# Patient Record
Sex: FEMALE | Race: WHITE | NOT HISPANIC OR LATINO | Employment: OTHER | URBAN - METROPOLITAN AREA
[De-identification: names, ages, dates, MRNs, and addresses within clinical notes are randomized per-mention and may not be internally consistent; named-entity substitution may affect disease eponyms.]

---

## 2023-05-04 ENCOUNTER — PATIENT OUTREACH (OUTPATIENT)
Dept: CARE COORDINATION | Facility: CLINIC | Age: 68
End: 2023-05-04
Payer: MEDICARE

## 2023-05-08 ENCOUNTER — PATIENT OUTREACH (OUTPATIENT)
Dept: CARE COORDINATION | Facility: CLINIC | Age: 68
End: 2023-05-08
Payer: MEDICARE

## 2023-06-14 ENCOUNTER — HOSPITAL ENCOUNTER (OUTPATIENT)
Dept: DATA CONVERSION | Facility: HOSPITAL | Age: 68
End: 2023-06-14
Attending: INTERNAL MEDICINE | Admitting: INTERNAL MEDICINE
Payer: MEDICARE

## 2023-06-14 DIAGNOSIS — I13.0 HYPERTENSIVE HEART AND CHRONIC KIDNEY DISEASE WITH HEART FAILURE AND STAGE 1 THROUGH STAGE 4 CHRONIC KIDNEY DISEASE, OR UNSPECIFIED CHRONIC KIDNEY DISEASE (MULTI): ICD-10-CM

## 2023-06-14 DIAGNOSIS — N18.9 CHRONIC KIDNEY DISEASE, UNSPECIFIED: ICD-10-CM

## 2023-06-14 DIAGNOSIS — D63.1 ANEMIA IN CHRONIC KIDNEY DISEASE (CODE): ICD-10-CM

## 2023-06-14 DIAGNOSIS — Z91.040 LATEX ALLERGY STATUS: ICD-10-CM

## 2023-06-14 DIAGNOSIS — E66.9 OBESITY, UNSPECIFIED: ICD-10-CM

## 2023-06-14 DIAGNOSIS — Z79.01 LONG TERM (CURRENT) USE OF ANTICOAGULANTS: ICD-10-CM

## 2023-06-14 DIAGNOSIS — I48.0 PAROXYSMAL ATRIAL FIBRILLATION (MULTI): ICD-10-CM

## 2023-06-14 DIAGNOSIS — E78.5 HYPERLIPIDEMIA, UNSPECIFIED: ICD-10-CM

## 2023-06-14 DIAGNOSIS — I50.32 CHRONIC DIASTOLIC (CONGESTIVE) HEART FAILURE (MULTI): ICD-10-CM

## 2023-06-14 DIAGNOSIS — Z79.82 LONG TERM (CURRENT) USE OF ASPIRIN: ICD-10-CM

## 2023-06-14 DIAGNOSIS — Z86.73 PERSONAL HISTORY OF TRANSIENT ISCHEMIC ATTACK (TIA), AND CEREBRAL INFARCTION WITHOUT RESIDUAL DEFICITS: ICD-10-CM

## 2023-06-14 DIAGNOSIS — J44.9 CHRONIC OBSTRUCTIVE PULMONARY DISEASE, UNSPECIFIED (MULTI): ICD-10-CM

## 2023-06-14 DIAGNOSIS — Z53.8 PROCEDURE AND TREATMENT NOT CARRIED OUT FOR OTHER REASONS: ICD-10-CM

## 2023-06-14 DIAGNOSIS — Z85.528 PERSONAL HISTORY OF OTHER MALIGNANT NEOPLASM OF KIDNEY: ICD-10-CM

## 2023-06-14 DIAGNOSIS — E03.9 HYPOTHYROIDISM, UNSPECIFIED: ICD-10-CM

## 2023-06-14 DIAGNOSIS — K21.9 GASTRO-ESOPHAGEAL REFLUX DISEASE WITHOUT ESOPHAGITIS: ICD-10-CM

## 2023-06-14 DIAGNOSIS — F17.210 NICOTINE DEPENDENCE, CIGARETTES, UNCOMPLICATED: ICD-10-CM

## 2023-06-14 DIAGNOSIS — E11.22 TYPE 2 DIABETES MELLITUS WITH DIABETIC CHRONIC KIDNEY DISEASE (MULTI): ICD-10-CM

## 2023-06-14 DIAGNOSIS — Z88.0 ALLERGY STATUS TO PENICILLIN: ICD-10-CM

## 2023-07-02 LAB
ATRIAL RATE: 62 BPM
P AXIS: 76 DEGREES
P OFFSET: 169 MS
P ONSET: 121 MS
PR INTERVAL: 188 MS
Q ONSET: 215 MS
QRS COUNT: 10 BEATS
QRS DURATION: 120 MS
QT INTERVAL: 482 MS
QTC CALCULATION(BAZETT): 489 MS
QTC FREDERICIA: 487 MS
R AXIS: -51 DEGREES
T AXIS: 103 DEGREES
T OFFSET: 456 MS
VENTRICULAR RATE: 62 BPM

## 2023-07-26 LAB
ANION GAP IN SER/PLAS: 13 MMOL/L (ref 10–20)
CALCIUM (MG/DL) IN SER/PLAS: 7.9 MG/DL (ref 8.6–10.6)
CARBON DIOXIDE, TOTAL (MMOL/L) IN SER/PLAS: 32 MMOL/L (ref 21–32)
CHLORIDE (MMOL/L) IN SER/PLAS: 97 MMOL/L (ref 98–107)
CREATININE (MG/DL) IN SER/PLAS: 2.72 MG/DL (ref 0.5–1.05)
GFR FEMALE: 18 ML/MIN/1.73M2
GLUCOSE (MG/DL) IN SER/PLAS: 343 MG/DL (ref 74–99)
POTASSIUM (MMOL/L) IN SER/PLAS: 3.4 MMOL/L (ref 3.5–5.3)
SODIUM (MMOL/L) IN SER/PLAS: 139 MMOL/L (ref 136–145)
UREA NITROGEN (MG/DL) IN SER/PLAS: 49 MG/DL (ref 6–23)

## 2023-07-27 ENCOUNTER — PATIENT OUTREACH (OUTPATIENT)
Dept: CARE COORDINATION | Facility: CLINIC | Age: 68
End: 2023-07-27
Payer: MEDICARE

## 2023-09-18 ENCOUNTER — DOCUMENTATION (OUTPATIENT)
Dept: PRIMARY CARE | Facility: CLINIC | Age: 68
End: 2023-09-18
Payer: MEDICARE

## 2023-09-25 ENCOUNTER — HOSPITAL ENCOUNTER (OUTPATIENT)
Dept: DATA CONVERSION | Facility: HOSPITAL | Age: 68
End: 2023-09-25
Attending: INTERNAL MEDICINE | Admitting: INTERNAL MEDICINE
Payer: MEDICARE

## 2023-09-25 DIAGNOSIS — D50.9 IRON DEFICIENCY ANEMIA, UNSPECIFIED: ICD-10-CM

## 2023-09-25 DIAGNOSIS — K56.699 OTHER INTESTINAL OBSTRUCTION UNSPECIFIED AS TO PARTIAL VERSUS COMPLETE OBSTRUCTION (MULTI): ICD-10-CM

## 2023-09-25 DIAGNOSIS — K64.0 FIRST DEGREE HEMORRHOIDS: ICD-10-CM

## 2023-09-25 LAB — POCT GLUCOSE: 178 MG/DL (ref 74–99)

## 2023-09-26 LAB — POCT INTERNATIONAL NORMALIZATION RATIO: 1.1 (ref 0.9–1.1)

## 2023-09-27 LAB
COMPLETE PATHOLOGY REPORT: NORMAL
CONVERTED CLINICAL DIAGNOSIS-HISTORY: NORMAL
CONVERTED FINAL DIAGNOSIS: NORMAL
CONVERTED FINAL REPORT PDF LINK TO COPY AND PASTE: NORMAL
CONVERTED GROSS DESCRIPTION: NORMAL

## 2023-09-29 VITALS
HEIGHT: 66 IN | RESPIRATION RATE: 20 BRPM | DIASTOLIC BLOOD PRESSURE: 82 MMHG | HEART RATE: 76 BPM | BODY MASS INDEX: 28.34 KG/M2 | WEIGHT: 176.37 LBS | TEMPERATURE: 97.9 F | SYSTOLIC BLOOD PRESSURE: 199 MMHG

## 2023-09-30 NOTE — H&P
History of Present Illness:   History Present Illness:  Reason for surgery: Anemia with iron deficiency   HPI:    Patient was seen in GI clinic by Amadeo for further evaluation due to anemia with iron deficiency.  No previous EGD.  Previous colonoscopy more than 10 years ago    Allergies:        Allergies:  ·  Latex : Unknown  ·  codeine : Unknown  ·  amoxicillin : Unknown  ·  Doxycycline  Hyclate: Unknown  ·  lisinopril : Unknown  ·  Tamiflu : Unknown  ·  Celexa : Unknown  ·  predniSONE : Unknown  ·  citalopram : Unknown  ·  Preparation  H: Unknown  ·  NYLON : Unknown    Home Medication Review:   Home Medications Reviewed: yes     Impression/Procedure:   ·  Impression and Planned Procedure: EGD, colonoscopy       ERAS (Enhanced Recovery After Surgery):  ·  ERAS Patient: no       Vital Signs:  Temperature C: 36.6 degrees C   Temperature F: 97.8 degrees F   Heart Rate: 76 beats per minute   Respiratory Rate: 20 breath per minute   Blood Pressure Systolic: 199 mm/Hg   Blood Pressure Diastolic: 82 mm/Hg     Physical Exam by System:    Respiratory/Thorax: Patent airways, CTAB, normal  breath sounds with good chest expansion, thorax symmetric   Cardiovascular: Regular, rate and rhythm, no murmurs,  2+ equal pulses of the extremities, normal S 1and S 2     Consent:   COVID-19 Consent:  ·  COVID-19 Risk Consent Surgeon has reviewed key risks related to the risk of octavio COVID-19 and if they contract COVID-19 what the risks are.       Electronic Signatures:  Kyung Cisneros)  (Signed 25-Sep-2023 08:13)   Authored: History of Present Illness, Allergies, Home  Medication Review, Impression/Procedure, ERAS, Physical Exam, Consent, Note Completion      Last Updated: 25-Sep-2023 08:13 by Kyung Cisneros)

## 2023-10-03 DIAGNOSIS — E03.9 HYPOTHYROIDISM, UNSPECIFIED TYPE: ICD-10-CM

## 2023-10-03 RX ORDER — LEVOTHYROXINE SODIUM 200 UG/1
200 TABLET ORAL
COMMUNITY
End: 2023-10-03 | Stop reason: SDUPTHER

## 2023-10-03 RX ORDER — LEVOTHYROXINE SODIUM 200 UG/1
200 TABLET ORAL
Qty: 90 TABLET | Refills: 3 | Status: SHIPPED | OUTPATIENT
Start: 2023-10-03

## 2023-10-03 RX ORDER — METOPROLOL SUCCINATE 50 MG/1
50 TABLET, EXTENDED RELEASE ORAL 2 TIMES DAILY
COMMUNITY
End: 2024-01-12

## 2023-10-11 DIAGNOSIS — I50.42 CHRONIC COMBINED SYSTOLIC AND DIASTOLIC HEART FAILURE (MULTI): Primary | ICD-10-CM

## 2023-10-11 RX ORDER — TORSEMIDE 60 MG/1
1 TABLET, FILM COATED ORAL DAILY
COMMUNITY
Start: 2023-07-23 | End: 2023-10-11 | Stop reason: SDUPTHER

## 2023-10-11 RX ORDER — TORSEMIDE 60 MG/1
1 TABLET, FILM COATED ORAL DAILY
Qty: 30 TABLET | Refills: 3 | Status: ON HOLD | OUTPATIENT
Start: 2023-10-11 | End: 2024-02-12

## 2023-10-12 ENCOUNTER — DOCUMENTATION (OUTPATIENT)
Dept: PRIMARY CARE | Facility: CLINIC | Age: 68
End: 2023-10-12
Payer: MEDICARE

## 2023-10-12 PROBLEM — R91.1 LUNG NODULE: Status: ACTIVE | Noted: 2023-10-12

## 2023-10-12 PROBLEM — F17.200 SMOKER: Status: ACTIVE | Noted: 2023-10-12

## 2023-10-12 PROBLEM — I48.0 PAROXYSMAL ATRIAL FIBRILLATION (MULTI): Status: ACTIVE | Noted: 2023-10-12

## 2023-10-12 PROBLEM — R26.2 TROUBLE WALKING: Status: ACTIVE | Noted: 2023-10-12

## 2023-10-12 PROBLEM — Z85.528 HISTORY OF RENAL CELL CARCINOMA: Status: ACTIVE | Noted: 2023-10-12

## 2023-10-12 PROBLEM — D50.0 IRON DEFICIENCY ANEMIA DUE TO CHRONIC BLOOD LOSS: Status: ACTIVE | Noted: 2023-10-12

## 2023-10-12 PROBLEM — Z90.5 HISTORY OF PARTIAL NEPHRECTOMY: Status: ACTIVE | Noted: 2023-10-12

## 2023-10-12 PROBLEM — I63.40 CEREBRAL INFARCTION DUE TO EMBOLISM OF CEREBRAL ARTERY (MULTI): Status: ACTIVE | Noted: 2023-10-12

## 2023-10-12 PROBLEM — I77.6 VASCULITIS (CMS-HCC): Status: ACTIVE | Noted: 2023-10-12

## 2023-10-12 PROBLEM — E11.9 TYPE 2 DIABETES MELLITUS (MULTI): Status: ACTIVE | Noted: 2023-10-12

## 2023-10-12 PROBLEM — M17.12 PRIMARY OSTEOARTHRITIS OF LEFT KNEE: Status: ACTIVE | Noted: 2023-10-12

## 2023-10-12 PROBLEM — J44.9 CHRONIC OBSTRUCTIVE PULMONARY DISEASE (MULTI): Status: ACTIVE | Noted: 2023-10-12

## 2023-10-12 PROBLEM — R41.82 ALTERED MENTAL STATUS: Status: ACTIVE | Noted: 2023-10-12

## 2023-10-12 PROBLEM — E11.65 HYPERGLYCEMIA DUE TO TYPE 2 DIABETES MELLITUS (MULTI): Status: ACTIVE | Noted: 2023-10-12

## 2023-10-12 PROBLEM — I69.311 MEMORY DEFICIT AFTER CEREBRAL INFARCTION: Status: ACTIVE | Noted: 2023-10-12

## 2023-10-12 PROBLEM — F32.A DEPRESSION: Status: ACTIVE | Noted: 2023-10-12

## 2023-10-12 PROBLEM — F41.9 ANXIETY: Status: ACTIVE | Noted: 2023-10-12

## 2023-10-12 PROBLEM — F32.9 MAJOR DEPRESSIVE DISORDER, SINGLE EPISODE, UNSPECIFIED: Status: ACTIVE | Noted: 2023-10-12

## 2023-10-12 PROBLEM — D64.9 ABSOLUTE ANEMIA: Status: ACTIVE | Noted: 2023-10-12

## 2023-10-12 PROBLEM — I50.43 ACUTE ON CHRONIC COMBINED SYSTOLIC AND DIASTOLIC HEART FAILURE (MULTI): Status: ACTIVE | Noted: 2023-10-12

## 2023-10-12 PROBLEM — R53.82 CHRONIC FATIGUE: Status: ACTIVE | Noted: 2023-10-12

## 2023-10-12 PROBLEM — I10 HYPERTENSION: Status: ACTIVE | Noted: 2023-10-12

## 2023-10-12 PROBLEM — I13.0: Status: ACTIVE | Noted: 2023-10-12

## 2023-10-12 PROBLEM — K21.9 GASTROESOPHAGEAL REFLUX DISEASE: Status: ACTIVE | Noted: 2023-10-12

## 2023-10-12 PROBLEM — M19.90 OSTEOARTHRITIS: Status: ACTIVE | Noted: 2023-10-12

## 2023-10-12 PROBLEM — L30.9 PERIANAL DERMATITIS: Status: ACTIVE | Noted: 2023-10-12

## 2023-10-12 PROBLEM — I73.9 PERIPHERAL VASCULAR DISEASE (CMS-HCC): Status: ACTIVE | Noted: 2023-10-12

## 2023-10-12 PROBLEM — F51.01 PRIMARY INSOMNIA: Status: ACTIVE | Noted: 2023-10-12

## 2023-10-12 PROBLEM — E11.21 DIABETIC RENAL DISEASE (MULTI): Status: ACTIVE | Noted: 2023-10-12

## 2023-10-12 PROBLEM — N18.32 STAGE 3B CHRONIC KIDNEY DISEASE (CKD) (MULTI): Status: ACTIVE | Noted: 2023-10-12

## 2023-10-12 PROBLEM — E78.2 MIXED HYPERLIPIDEMIA: Status: ACTIVE | Noted: 2023-10-12

## 2023-10-12 PROBLEM — K59.01 CONSTIPATION BY DELAYED COLONIC TRANSIT: Status: ACTIVE | Noted: 2023-10-12

## 2023-10-12 PROBLEM — K60.2 ANAL FISSURE: Status: ACTIVE | Noted: 2023-10-12

## 2023-10-12 PROBLEM — N28.89 RIGHT RENAL MASS: Status: ACTIVE | Noted: 2023-10-12

## 2023-10-12 PROBLEM — D72.829 LEUKOCYTOSIS: Status: ACTIVE | Noted: 2023-10-12

## 2023-10-12 RX ORDER — FERROUS SULFATE 325(65) MG
65 TABLET ORAL DAILY
COMMUNITY
End: 2023-10-13 | Stop reason: WASHOUT

## 2023-10-12 RX ORDER — TRAZODONE HYDROCHLORIDE 50 MG/1
50 TABLET ORAL NIGHTLY PRN
COMMUNITY
Start: 2012-12-01 | End: 2023-10-13 | Stop reason: WASHOUT

## 2023-10-12 RX ORDER — AMIODARONE HYDROCHLORIDE 200 MG/1
200 TABLET ORAL
COMMUNITY
End: 2024-06-10 | Stop reason: SDUPTHER

## 2023-10-12 RX ORDER — OMEPRAZOLE 40 MG/1
1 CAPSULE, DELAYED RELEASE ORAL DAILY
COMMUNITY
Start: 2015-12-28 | End: 2023-10-13 | Stop reason: WASHOUT

## 2023-10-12 RX ORDER — TRAMADOL HYDROCHLORIDE 50 MG/1
1 TABLET ORAL 2 TIMES DAILY PRN
COMMUNITY
Start: 2013-07-29 | End: 2023-10-13 | Stop reason: WASHOUT

## 2023-10-12 RX ORDER — LOVASTATIN 40 MG/1
1 TABLET ORAL DAILY
COMMUNITY
Start: 2015-12-28 | End: 2023-10-13 | Stop reason: WASHOUT

## 2023-10-12 RX ORDER — POTASSIUM CHLORIDE 1500 MG/1
20 TABLET, EXTENDED RELEASE ORAL DAILY
COMMUNITY
End: 2024-01-12

## 2023-10-12 RX ORDER — BLOOD-GLUCOSE METER
KIT MISCELLANEOUS 2 TIMES DAILY
COMMUNITY
Start: 2014-10-30 | End: 2024-03-05 | Stop reason: HOSPADM

## 2023-10-12 RX ORDER — ALBUTEROL SULFATE 90 UG/1
2 AEROSOL, METERED RESPIRATORY (INHALATION) EVERY 4 HOURS PRN
COMMUNITY
Start: 2023-03-11

## 2023-10-12 RX ORDER — ATENOLOL 25 MG/1
1 TABLET ORAL 2 TIMES DAILY
COMMUNITY
Start: 2014-04-08 | End: 2023-10-13 | Stop reason: WASHOUT

## 2023-10-12 RX ORDER — BUPROPION HYDROCHLORIDE 300 MG/1
1 TABLET ORAL DAILY
COMMUNITY
Start: 2015-12-28 | End: 2023-10-13 | Stop reason: WASHOUT

## 2023-10-12 RX ORDER — FUROSEMIDE 20 MG/1
20 TABLET ORAL DAILY
COMMUNITY
Start: 2023-02-11 | End: 2023-10-13 | Stop reason: WASHOUT

## 2023-10-12 RX ORDER — TORSEMIDE 20 MG/1
40 TABLET ORAL DAILY
COMMUNITY
Start: 2023-05-24 | End: 2023-10-13 | Stop reason: WASHOUT

## 2023-10-12 RX ORDER — ATORVASTATIN CALCIUM 40 MG/1
40 TABLET, FILM COATED ORAL DAILY
COMMUNITY

## 2023-10-12 RX ORDER — AMLODIPINE BESYLATE 5 MG/1
5 TABLET ORAL DAILY
COMMUNITY
Start: 2023-09-21 | End: 2024-03-05 | Stop reason: HOSPADM

## 2023-10-12 RX ORDER — TORSEMIDE 40 MG/1
1 TABLET, FILM COATED ORAL DAILY
COMMUNITY
Start: 2023-05-03 | End: 2023-10-13 | Stop reason: WASHOUT

## 2023-10-12 RX ORDER — INSULIN LISPRO 100 [IU]/ML
INJECTION, SOLUTION INTRAVENOUS; SUBCUTANEOUS
COMMUNITY
Start: 2021-11-16 | End: 2024-04-29 | Stop reason: SDUPTHER

## 2023-10-12 RX ORDER — GLYBURIDE-METFORMIN HYDROCHLORIDE 5; 500 MG/1; MG/1
2 TABLET ORAL 2 TIMES DAILY
COMMUNITY
Start: 2015-12-28 | End: 2023-10-13 | Stop reason: WASHOUT

## 2023-10-12 RX ORDER — PANTOPRAZOLE SODIUM 40 MG/1
40 TABLET, DELAYED RELEASE ORAL 2 TIMES DAILY
COMMUNITY
Start: 2021-11-05 | End: 2024-01-04 | Stop reason: SDUPTHER

## 2023-10-12 RX ORDER — CLOPIDOGREL BISULFATE 75 MG/1
75 TABLET ORAL DAILY
COMMUNITY

## 2023-10-12 RX ORDER — INSULIN GLARGINE 100 [IU]/ML
10 INJECTION, SOLUTION SUBCUTANEOUS NIGHTLY
COMMUNITY
End: 2024-04-29 | Stop reason: SDUPTHER

## 2023-10-12 RX ORDER — ASPIRIN 81 MG/1
81 TABLET ORAL DAILY
COMMUNITY

## 2023-10-12 RX ORDER — PEN NEEDLE, DIABETIC 32GX 5/32"
NEEDLE, DISPOSABLE MISCELLANEOUS
COMMUNITY
Start: 2023-08-31

## 2023-10-12 RX ORDER — FERROUS GLUCONATE 324(38)MG
1 TABLET ORAL DAILY
COMMUNITY
End: 2024-01-02 | Stop reason: SDUPTHER

## 2023-10-12 RX ORDER — FLASH GLUCOSE SENSOR
KIT MISCELLANEOUS
COMMUNITY
Start: 2023-10-06 | End: 2024-05-29 | Stop reason: SDUPTHER

## 2023-10-12 RX ORDER — IPRATROPIUM BROMIDE AND ALBUTEROL SULFATE 2.5; .5 MG/3ML; MG/3ML
3 SOLUTION RESPIRATORY (INHALATION) EVERY 6 HOURS PRN
COMMUNITY
End: 2024-05-02 | Stop reason: HOSPADM

## 2023-10-12 RX ORDER — VENLAFAXINE HYDROCHLORIDE 75 MG/1
150 CAPSULE, EXTENDED RELEASE ORAL DAILY
COMMUNITY
End: 2024-05-03 | Stop reason: SDUPTHER

## 2023-10-12 RX ORDER — APIXABAN 5 MG/1
5 TABLET, FILM COATED ORAL 2 TIMES DAILY
COMMUNITY
Start: 2023-05-26 | End: 2023-10-13 | Stop reason: WASHOUT

## 2023-10-12 NOTE — PROGRESS NOTES
In home House Calls CM visit 10/11/23. Compliant with taking medications most days over past 2 weeks. Stated heart catheterization was not able to be completed due to inability to lay flat. Continues to use oxygen at night. No leg edema noted. Weight today 180. Reviewed meds with patient. Pill boxes filled. Will follow up in 2 weeks.

## 2023-10-13 ENCOUNTER — OFFICE VISIT (OUTPATIENT)
Dept: PRIMARY CARE | Facility: CLINIC | Age: 68
End: 2023-10-13
Payer: MEDICARE

## 2023-10-13 VITALS
OXYGEN SATURATION: 93 % | SYSTOLIC BLOOD PRESSURE: 136 MMHG | BODY MASS INDEX: 28.25 KG/M2 | HEIGHT: 67 IN | WEIGHT: 180 LBS | TEMPERATURE: 97.3 F | RESPIRATION RATE: 16 BRPM | DIASTOLIC BLOOD PRESSURE: 78 MMHG | HEART RATE: 60 BPM

## 2023-10-13 DIAGNOSIS — J44.9 CHRONIC OBSTRUCTIVE PULMONARY DISEASE, UNSPECIFIED COPD TYPE (MULTI): ICD-10-CM

## 2023-10-13 DIAGNOSIS — F33.1 MODERATE EPISODE OF RECURRENT MAJOR DEPRESSIVE DISORDER (MULTI): ICD-10-CM

## 2023-10-13 DIAGNOSIS — Z79.4 TYPE 2 DIABETES MELLITUS WITH HYPERGLYCEMIA, WITH LONG-TERM CURRENT USE OF INSULIN (MULTI): ICD-10-CM

## 2023-10-13 DIAGNOSIS — N18.32 STAGE 3B CHRONIC KIDNEY DISEASE (CKD) (MULTI): ICD-10-CM

## 2023-10-13 DIAGNOSIS — F17.200 SMOKER: ICD-10-CM

## 2023-10-13 DIAGNOSIS — I48.0 PAROXYSMAL ATRIAL FIBRILLATION (MULTI): ICD-10-CM

## 2023-10-13 DIAGNOSIS — I10 PRIMARY HYPERTENSION: ICD-10-CM

## 2023-10-13 DIAGNOSIS — E11.65 TYPE 2 DIABETES MELLITUS WITH HYPERGLYCEMIA, WITH LONG-TERM CURRENT USE OF INSULIN (MULTI): ICD-10-CM

## 2023-10-13 DIAGNOSIS — I50.43 ACUTE ON CHRONIC COMBINED SYSTOLIC AND DIASTOLIC HEART FAILURE (MULTI): Primary | ICD-10-CM

## 2023-10-13 DIAGNOSIS — D50.0 IRON DEFICIENCY ANEMIA DUE TO CHRONIC BLOOD LOSS: ICD-10-CM

## 2023-10-13 PROCEDURE — 3046F HEMOGLOBIN A1C LEVEL >9.0%: CPT | Performed by: NURSE PRACTITIONER

## 2023-10-13 PROCEDURE — 3066F NEPHROPATHY DOC TX: CPT | Performed by: NURSE PRACTITIONER

## 2023-10-13 PROCEDURE — 1126F AMNT PAIN NOTED NONE PRSNT: CPT | Performed by: NURSE PRACTITIONER

## 2023-10-13 PROCEDURE — 1159F MED LIST DOCD IN RCRD: CPT | Performed by: NURSE PRACTITIONER

## 2023-10-13 PROCEDURE — 3075F SYST BP GE 130 - 139MM HG: CPT | Performed by: NURSE PRACTITIONER

## 2023-10-13 PROCEDURE — 3078F DIAST BP <80 MM HG: CPT | Performed by: NURSE PRACTITIONER

## 2023-10-13 PROCEDURE — 1160F RVW MEDS BY RX/DR IN RCRD: CPT | Performed by: NURSE PRACTITIONER

## 2023-10-13 PROCEDURE — 99349 HOME/RES VST EST MOD MDM 40: CPT | Performed by: NURSE PRACTITIONER

## 2023-10-13 RX ORDER — LEVOTHYROXINE SODIUM 50 UG/1
50 TABLET ORAL
COMMUNITY
End: 2024-05-03 | Stop reason: SDUPTHER

## 2023-10-13 ASSESSMENT — ENCOUNTER SYMPTOMS
APPETITE CHANGE: 0
CONSTIPATION: 0
PALPITATIONS: 0
NAUSEA: 0
DIFFICULTY URINATING: 0
VOMITING: 0
CHILLS: 0
COUGH: 0
SHORTNESS OF BREATH: 1
OCCASIONAL FEELINGS OF UNSTEADINESS: 1
TROUBLE SWALLOWING: 0
LIGHT-HEADEDNESS: 0
LOSS OF SENSATION IN FEET: 1
DEPRESSION: 1
FEVER: 0
WHEEZING: 1
DIARRHEA: 0
ABDOMINAL PAIN: 0
DIZZINESS: 0

## 2023-10-13 ASSESSMENT — PAIN SCALES - GENERAL: PAINLEVEL: 0-NO PAIN

## 2023-10-13 NOTE — PROGRESS NOTES
Subjective   Patient ID: Daphne Morris is a 68 y.o. female who presents for Follow-up (Multiple chronic medical conditions).    Visit for 69 y/o female seen today in private home, alone for routine follow up of multiple medical conditions. She is sitting on recliner with legs dependent. She is alert, able to answer simple questions regarding her health but does have noted memory impairment and is an overall poor historian regarding her health. She lives in a mobile home that is very unkept. There is garbage and boxes piled all throughout the home. Her stove is covered with multiple pizza boxes, donut boxes and there is cereal and boxes of snacks on the counter tops. Her granddaughter recently moved in and she reports increased stress as she is now responsible for her. She continues to have difficulty managing her medications. She is active with house calls CM Autumn who fills patients pill box for her every 2 weeks. She was seen in home yesterday and Autumn reported that patient has been compliant with her medications. Patient is able to prepare her own meals and perform ADLs without difficulty. She denies appetite changes or signs of weight loss. Denies abdominal pain, nausea, vomiting. Denies bowel or bladder concerns. Remains ambulatory with rollator. Has history of frequent falls.     Afib- patient had watchman procedure on 8/22. She is doing well. No longer on Eliquis. Taking Aspirin and Plavix now. Denies any chest pain, heart palpitations.   Anemia- patient did follow up with GI, Dr. Cisneros last month. She had her EGD/Colonoscopy on 9/25. She needs to have a repeat coloscopy in 1 year because the bowel prep was poor and there was a large amount of stool in the colon.   CHF- patient is trying to check her weight daily but admits that she will forget at times. Her weight has been stable. Denies any issues with increased shortness of breath nor edema. Has difficulty following a low sodium diet.   DM- uses  "freestyle shakir. Monitoring glucose levels 3x daily. She reports her glucose levels to be \"ok\". BG was 268 this morning but she reports eating a donut before checking her levels. Values have ranged from . She continues to eat high carb, snacks, junk food. Does not eat healthy diet. Does not exercise.  CKD- patient has not followed up with nephrologist Dr. Mitchell. She did miss her last scheduled appt with nephrology and is uncertain if she ever rescheduled the appt.     Home Visit:          Medically necessary due to: Illness or condition that results in activity lmitation or restriction that impacts the ability to leave home such as:, unsteady gait/poor balance         Current Outpatient Medications:     albuterol 90 mcg/actuation inhaler, Inhale 2 puffs every 4 hours if needed., Disp: , Rfl:     amiodarone (Pacerone) 200 mg tablet, Take 1 tablet (200 mg) by mouth once daily with a meal., Disp: , Rfl:     amLODIPine (Norvasc) 5 mg tablet, Take 1 tablet (5 mg) by mouth once daily., Disp: , Rfl:     aspirin 81 mg EC tablet, Take 1 tablet (81 mg) by mouth once daily., Disp: , Rfl:     atorvastatin (Lipitor) 40 mg tablet, Take 1 tablet (40 mg) by mouth once daily., Disp: , Rfl:     BD Calista 2nd Gen Pen Needle 32 gauge x 5/32\" needle, USE SUBCUTANEOUSLY AS DIRECTED TWICE DAILY, Disp: , Rfl:     clopidogrel (Plavix) 75 mg tablet, Take 1 tablet (75 mg) by mouth once daily., Disp: , Rfl:     ferrous gluconate 324 (38 Fe) MG tablet, Take 1 tablet (324 mg) by mouth once daily., Disp: , Rfl:     FreeStyle Shakir 14 Day Sensor kit, USE AS DIRECTED TO CHECK SUGARS 3 TO 4 TIMES DAILY, Disp: , Rfl:     FreeStyle Lite Strips strip, twice a day., Disp: , Rfl:     HumaLOG KwikPen Insulin 100 unit/mL injection, up to 15 units Subcutaneous three times a day per sliding scale, Disp: , Rfl:     ipratropium-albuteroL (Duo-Neb) 0.5-2.5 mg/3 mL nebulizer solution, Take 3 mL by nebulization every 6 hours if needed., Disp: , Rfl:     " Lantus Solostar U-100 Insulin 100 unit/mL (3 mL) pen, Inject 14 Units under the skin once daily at bedtime., Disp: , Rfl:     levothyroxine (Synthroid, Levoxyl) 200 mcg tablet, Take 1 tablet (200 mcg) by mouth once daily in the morning. Take before meals., Disp: 90 tablet, Rfl: 3    levothyroxine (Synthroid, Levoxyl) 50 mcg tablet, Take 1 tablet (50 mcg) by mouth once daily in the morning. Take before meals., Disp: , Rfl:     metoprolol succinate XL (Toprol-XL) 50 mg 24 hr tablet, Take 1 tablet (50 mg) by mouth 2 times a day., Disp: , Rfl:     oxygen (O2) gas therapy, 2 l/min nasal cannula, Disp: , Rfl:     pantoprazole (ProtoNix) 40 mg EC tablet, Take 1 tablet (40 mg) by mouth 2 times a day., Disp: , Rfl:     potassium chloride CR 20 mEq ER tablet, Take 1 tablet (20 mEq) by mouth once daily. Take with food., Disp: , Rfl:     SITagliptin phosphate (Januvia) 100 mg tablet, Take 1 tablet (100 mg) by mouth once daily., Disp: , Rfl:     Soaanz 60 mg tablet, Take 1 tablet by mouth once daily., Disp: 30 tablet, Rfl: 3    venlafaxine XR (Effexor-XR) 75 mg 24 hr capsule, Take 1 capsule (75 mg) by mouth once daily., Disp: , Rfl:      Review of Systems   Constitutional:  Negative for appetite change, chills and fever.   HENT:  Negative for trouble swallowing.    Respiratory:  Positive for shortness of breath (with exertion) and wheezing (occasional). Negative for cough.    Cardiovascular:  Negative for chest pain, palpitations and leg swelling.   Gastrointestinal:  Negative for abdominal pain, constipation, diarrhea, nausea and vomiting.   Endocrine:        Positive for thyroid disease, diabetes   Genitourinary:  Negative for difficulty urinating.   Musculoskeletal:  Positive for gait problem.   Neurological:  Negative for dizziness and light-headedness.        Positive for memory loss   Psychiatric/Behavioral:          Positive for depression, anxiety      Objective   /78 (BP Location: Right arm, Patient Position:  "Sitting, BP Cuff Size: Large adult)   Pulse 60   Temp 36.3 °C (97.3 °F) (Temporal)   Resp 16   Ht 1.689 m (5' 6.5\")   Wt 81.6 kg (180 lb)   SpO2 93%   BMI 28.62 kg/m²     Physical Exam  Constitutional:       Appearance: Normal appearance.      Comments: Sitting in recliner with legs dependent   HENT:      Head: Normocephalic and atraumatic.      Nose: Nose normal.      Mouth/Throat:      Mouth: Mucous membranes are moist.      Pharynx: Oropharynx is clear.   Eyes:      General:         Left eye: No discharge.      Extraocular Movements: Extraocular movements intact.      Pupils: Pupils are equal, round, and reactive to light.   Cardiovascular:      Rate and Rhythm: Normal rate and regular rhythm.      Pulses: Normal pulses.      Heart sounds: Normal heart sounds.   Pulmonary:      Effort: Pulmonary effort is normal. No respiratory distress.      Breath sounds: Decreased air movement present. Wheezing present.   Abdominal:      General: Bowel sounds are normal. There is no distension.      Palpations: Abdomen is soft.      Tenderness: There is no abdominal tenderness.   Musculoskeletal:      Cervical back: Neck supple.   Skin:     General: Skin is warm and dry.      Comments: skin to LE dry, scaly but no open wounds noted,   Neurological:      General: No focal deficit present.      Mental Status: She is alert and oriented to person, place, and time.   Psychiatric:         Mood and Affect: Mood normal.         Behavior: Behavior normal.       Assessment/Plan   Diagnoses and all orders for this visit:  Acute on chronic combined systolic and diastolic heart failure (CMS/HCC)  Comments:  chronic, edema stable, euvolemic on exam. Pt to monitor her weight daily, reduce sodium intake, elevate BLE throughout the day  Paroxysmal atrial fibrillation (CMS/HCC)  Comments:  chronic, stable. s/p watchman procedure. HR regular, controlled. Continue amiodorone, aspirin, plavix  Type 2 diabetes mellitus with hyperglycemia, " with long-term current use of insulin (CMS/[facility])  Comments:  chronic, uncontrolled, will check A1c. Diet remains poor- high in carbs/sugar. Education regarding healthy diet habits discussed  Orders:  -     Hemoglobin A1c; Future  Primary hypertension  Comments:  chronic, vitals stable, continue Norvasc  Orders:  -     CBC and Auto Differential; Future  -     Basic metabolic panel; Future  Chronic obstructive pulmonary disease, unspecified COPD type (CMS/[facility])  Comments:  chronic, continue with duonebs, albuterol inhaler as needed  Smoker  Comments:  chronic, discussed importance of smoking cessation with pt  Iron deficiency anemia due to chronic blood loss  Comments:  chronic, continue ferrous gluconate, will check CBC  Stage 3b chronic kidney disease (CKD) (CMS/[facility])  Comments:  chronic, pt to reschedule follow up with Dr. Mitchell. Will check BMP  Moderate episode of recurrent major depressive disorder (CMS/[facility])  Comments:  chronic, mood stable, continue effexor       Ploy Diaz, APRN-CNP

## 2023-10-18 ENCOUNTER — LAB (OUTPATIENT)
Dept: LAB | Facility: LAB | Age: 68
End: 2023-10-18
Payer: MEDICARE

## 2023-10-18 DIAGNOSIS — Z79.4 TYPE 2 DIABETES MELLITUS WITH HYPERGLYCEMIA, WITH LONG-TERM CURRENT USE OF INSULIN (MULTI): ICD-10-CM

## 2023-10-18 DIAGNOSIS — I10 PRIMARY HYPERTENSION: ICD-10-CM

## 2023-10-18 DIAGNOSIS — E11.65 TYPE 2 DIABETES MELLITUS WITH HYPERGLYCEMIA, WITH LONG-TERM CURRENT USE OF INSULIN (MULTI): ICD-10-CM

## 2023-10-18 LAB
ANION GAP SERPL CALC-SCNC: 12 MMOL/L (ref 10–20)
BASOPHILS # BLD AUTO: 0.04 X10*3/UL (ref 0–0.1)
BASOPHILS NFR BLD AUTO: 0.6 %
BUN SERPL-MCNC: 35 MG/DL (ref 6–23)
CALCIUM SERPL-MCNC: 7.8 MG/DL (ref 8.6–10.3)
CHLORIDE SERPL-SCNC: 104 MMOL/L (ref 98–107)
CO2 SERPL-SCNC: 31 MMOL/L (ref 21–32)
CREAT SERPL-MCNC: 2.43 MG/DL (ref 0.5–1.05)
EOSINOPHIL # BLD AUTO: 0.13 X10*3/UL (ref 0–0.7)
EOSINOPHIL NFR BLD AUTO: 1.9 %
ERYTHROCYTE [DISTWIDTH] IN BLOOD BY AUTOMATED COUNT: 14.3 % (ref 11.5–14.5)
EST. AVERAGE GLUCOSE BLD GHB EST-MCNC: 223 MG/DL
GFR SERPL CREATININE-BSD FRML MDRD: 21 ML/MIN/1.73M*2
GLUCOSE SERPL-MCNC: 278 MG/DL (ref 74–99)
HBA1C MFR BLD: 9.4 %
HCT VFR BLD AUTO: 31.2 % (ref 36–46)
HGB BLD-MCNC: 9.2 G/DL (ref 12–16)
IMM GRANULOCYTES # BLD AUTO: 0.02 X10*3/UL (ref 0–0.7)
IMM GRANULOCYTES NFR BLD AUTO: 0.3 % (ref 0–0.9)
LYMPHOCYTES # BLD AUTO: 1.73 X10*3/UL (ref 1.2–4.8)
LYMPHOCYTES NFR BLD AUTO: 25 %
MCH RBC QN AUTO: 29.1 PG (ref 26–34)
MCHC RBC AUTO-ENTMCNC: 29.5 G/DL (ref 32–36)
MCV RBC AUTO: 99 FL (ref 80–100)
MONOCYTES # BLD AUTO: 0.54 X10*3/UL (ref 0.1–1)
MONOCYTES NFR BLD AUTO: 7.8 %
NEUTROPHILS # BLD AUTO: 4.45 X10*3/UL (ref 1.2–7.7)
NEUTROPHILS NFR BLD AUTO: 64.4 %
NRBC BLD-RTO: 0 /100 WBCS (ref 0–0)
PLATELET # BLD AUTO: 345 X10*3/UL (ref 150–450)
PMV BLD AUTO: 10.5 FL (ref 7.5–11.5)
POTASSIUM SERPL-SCNC: 4.2 MMOL/L (ref 3.5–5.3)
RBC # BLD AUTO: 3.16 X10*6/UL (ref 4–5.2)
SODIUM SERPL-SCNC: 143 MMOL/L (ref 136–145)
WBC # BLD AUTO: 6.9 X10*3/UL (ref 4.4–11.3)

## 2023-10-18 PROCEDURE — 36415 COLL VENOUS BLD VENIPUNCTURE: CPT

## 2023-10-18 PROCEDURE — 83036 HEMOGLOBIN GLYCOSYLATED A1C: CPT

## 2023-10-20 ENCOUNTER — TELEPHONE (OUTPATIENT)
Dept: PRIMARY CARE | Facility: CLINIC | Age: 68
End: 2023-10-20
Payer: MEDICARE

## 2023-10-20 NOTE — TELEPHONE ENCOUNTER
Carolina with [employer], a third party provider for patient's medical insurance who do at home risk assessments once yearly.  During the visit with the patient today, the patient had elevated Blood pressure of 188/81 Left arm, 175/79 left arm, 185/85 Right arm.  Carolina does mention patient is a poor historian, mentioned she took her meds at 11:00 am today and visit was at 2:00 pm.  Carolina states she just wanted you to know the BP for today was high.

## 2023-10-25 ENCOUNTER — DOCUMENTATION (OUTPATIENT)
Dept: PRIMARY CARE | Facility: CLINIC | Age: 68
End: 2023-10-25
Payer: MEDICARE

## 2023-10-25 NOTE — PROGRESS NOTES
House Calls CM follow up in home visit. Inconsistent with taking meds over past 2 weeks. Pill boxes filled for 2 weeks. Has not been performing daily weights. Weight today 180, no change from previous weight. No BLE edema noted. Reports minimal SOB with activity. Using oxygen at night. Has scheduled an appointment with nephrology, Dr. Mitchell, for January.   Will follow up in 2 weeks.

## 2023-11-07 ENCOUNTER — TELEPHONE (OUTPATIENT)
Dept: CARE COORDINATION | Facility: CLINIC | Age: 68
End: 2023-11-07
Payer: MEDICARE

## 2023-11-08 ENCOUNTER — DOCUMENTATION (OUTPATIENT)
Dept: PRIMARY CARE | Facility: CLINIC | Age: 68
End: 2023-11-08
Payer: MEDICARE

## 2023-11-08 NOTE — PROGRESS NOTES
House Calls CM follow up in home visit. Inconsistent with taking medication over past 2 weeks. Pill boxes filled for 2 weeks. Continues to use Freestyle Shakir CGM and insulin. Has been weighing self daily. Weight stable at 179 for past several days. Expressed feelings of depression and stress related to issues with her granddaughter living with her. Agreeable to mental health referral. Provider updated. Received order for referral to [facility].

## 2023-11-09 ENCOUNTER — TELEPHONE (OUTPATIENT)
Dept: PRIMARY CARE | Facility: CLINIC | Age: 68
End: 2023-11-09
Payer: MEDICARE

## 2023-11-09 DIAGNOSIS — F32.9 MAJOR DEPRESSIVE DISORDER WITH CURRENT ACTIVE EPISODE, UNSPECIFIED DEPRESSION EPISODE SEVERITY, UNSPECIFIED WHETHER RECURRENT: ICD-10-CM

## 2023-11-09 DIAGNOSIS — F41.9 ANXIETY: ICD-10-CM

## 2023-11-09 NOTE — TELEPHONE ENCOUNTER
Received email order from provider for psychiatry referral to [facility]/Liz Neff CNP.

## 2023-11-20 ENCOUNTER — DOCUMENTATION (OUTPATIENT)
Dept: PRIMARY CARE | Facility: CLINIC | Age: 68
End: 2023-11-20
Payer: MEDICARE

## 2023-11-20 NOTE — PROGRESS NOTES
House Calls  in home follow up visit. Missed several days of meds over past 2 weeks. Stated she thought she was taking them. Discussed importance of taking meds as prescribed. Pill boxes refilled. Has been more consistent with daily weights. Weights stable at 179-180. Confirmed via phone that referral received by [facility] for mental health services.

## 2023-11-22 PROBLEM — E78.5 HYPERLIPIDEMIA, UNSPECIFIED: Status: ACTIVE | Noted: 2023-07-26

## 2023-11-27 ENCOUNTER — TELEPHONE (OUTPATIENT)
Dept: PRIMARY CARE | Facility: CLINIC | Age: 68
End: 2023-11-27
Payer: MEDICARE

## 2023-11-28 ENCOUNTER — PATIENT OUTREACH (OUTPATIENT)
Dept: CARE COORDINATION | Facility: CLINIC | Age: 68
End: 2023-11-28

## 2023-11-28 ENCOUNTER — TELEPHONE (OUTPATIENT)
Dept: PRIMARY CARE | Facility: CLINIC | Age: 68
End: 2023-11-28

## 2023-11-28 ENCOUNTER — APPOINTMENT (OUTPATIENT)
Dept: PRIMARY CARE | Facility: CLINIC | Age: 68
End: 2023-11-28
Payer: MEDICARE

## 2023-11-28 NOTE — TELEPHONE ENCOUNTER
Call placed to patient number as listed, appointment to be offered/scheduled for 12/4/23 with provider Poly Diaz NP 9:30 am.

## 2023-12-01 ENCOUNTER — TELEPHONE (OUTPATIENT)
Dept: PRIMARY CARE | Facility: CLINIC | Age: 68
End: 2023-12-01
Payer: MEDICARE

## 2023-12-04 ENCOUNTER — OFFICE VISIT (OUTPATIENT)
Dept: PRIMARY CARE | Facility: CLINIC | Age: 68
End: 2023-12-04
Payer: MEDICARE

## 2023-12-04 ENCOUNTER — APPOINTMENT (OUTPATIENT)
Dept: PRIMARY CARE | Facility: CLINIC | Age: 68
End: 2023-12-04
Payer: MEDICARE

## 2023-12-04 ENCOUNTER — DOCUMENTATION (OUTPATIENT)
Dept: PRIMARY CARE | Facility: CLINIC | Age: 68
End: 2023-12-04

## 2023-12-04 VITALS
RESPIRATION RATE: 18 BRPM | HEART RATE: 60 BPM | DIASTOLIC BLOOD PRESSURE: 74 MMHG | TEMPERATURE: 97.5 F | HEIGHT: 68 IN | BODY MASS INDEX: 28.04 KG/M2 | OXYGEN SATURATION: 95 % | WEIGHT: 185 LBS | SYSTOLIC BLOOD PRESSURE: 144 MMHG

## 2023-12-04 DIAGNOSIS — F41.9 ANXIETY AND DEPRESSION: ICD-10-CM

## 2023-12-04 DIAGNOSIS — F32.A ANXIETY AND DEPRESSION: ICD-10-CM

## 2023-12-04 DIAGNOSIS — Z79.4 TYPE 2 DIABETES MELLITUS WITH HYPERGLYCEMIA, WITH LONG-TERM CURRENT USE OF INSULIN (MULTI): ICD-10-CM

## 2023-12-04 DIAGNOSIS — I13.0: Primary | ICD-10-CM

## 2023-12-04 DIAGNOSIS — I48.0 PAROXYSMAL ATRIAL FIBRILLATION (MULTI): ICD-10-CM

## 2023-12-04 DIAGNOSIS — F17.200 SMOKER: ICD-10-CM

## 2023-12-04 DIAGNOSIS — E11.65 TYPE 2 DIABETES MELLITUS WITH HYPERGLYCEMIA, WITH LONG-TERM CURRENT USE OF INSULIN (MULTI): ICD-10-CM

## 2023-12-04 DIAGNOSIS — N18.4 STAGE 4 CHRONIC KIDNEY DISEASE (MULTI): ICD-10-CM

## 2023-12-04 PROCEDURE — 1126F AMNT PAIN NOTED NONE PRSNT: CPT | Performed by: NURSE PRACTITIONER

## 2023-12-04 PROCEDURE — 3077F SYST BP >= 140 MM HG: CPT | Performed by: NURSE PRACTITIONER

## 2023-12-04 PROCEDURE — 3078F DIAST BP <80 MM HG: CPT | Performed by: NURSE PRACTITIONER

## 2023-12-04 PROCEDURE — 3046F HEMOGLOBIN A1C LEVEL >9.0%: CPT | Performed by: NURSE PRACTITIONER

## 2023-12-04 PROCEDURE — 3066F NEPHROPATHY DOC TX: CPT | Performed by: NURSE PRACTITIONER

## 2023-12-04 PROCEDURE — 1160F RVW MEDS BY RX/DR IN RCRD: CPT | Performed by: NURSE PRACTITIONER

## 2023-12-04 PROCEDURE — 99350 HOME/RES VST EST HIGH MDM 60: CPT | Performed by: NURSE PRACTITIONER

## 2023-12-04 PROCEDURE — 1159F MED LIST DOCD IN RCRD: CPT | Performed by: NURSE PRACTITIONER

## 2023-12-04 ASSESSMENT — PAIN SCALES - GENERAL: PAINLEVEL: 0-NO PAIN

## 2023-12-04 NOTE — PROGRESS NOTES
Subjective   Patient ID: Daphne Morris is a 68 y.o. female who presents for Follow-up (Multiple medical concerns ).    Visit for 69 y/o female seen today in private home, accompanied by house calls BRIE Leyva for routine follow up of multiple medical issues. Patient was initially sitting at dining room table smoking. She was able to ambulate into her living room and is sitting on rollator now with legs dependent. Patient is alert, able to answer simple questions regarding her health. She does have noted memory impairment and is an overall poor historian regarding her health. Patient lives in a mobile home. Her granddaughter was living with her but ended up staying in [place] when they went down for Thanksgiving. Pt reports that she was gone for 4 days. She reports taking all of her medications while she was gone but upon review of her pill box she has missed 5 days and 6 evenings of her medications out of the last 2 weeks. BRIE Leyva with house calls continues to fill pill boxes for her every other week. Patients mobile home is very unkept. There is garbage and boxes piled all throughout the home. She is very overwhelmed by the condition of her home and her health. She is scheduled to establish care with Lzi Neff mental health NP with [facility] on 12/9/23. She denies SI. Patient does not drive or have a vehicle. She has a friend Doug who will help with transportation to medical appointments. Patient is able to prepare simple meals for herself. She is able to perform all ADLs without assistance. She denies appetite changes. She has had weight gain since last follow up. She does not follow a low sodium diet. She denies abdominal pain, nausea, vomiting. Denies bowel or bladder concerns. Remains ambulatory with rollator. Has history of frequent falls. Reports her last fall to be about 1 month ago.     Afib- patient continues on Aspirin and Plavix. No longer on Eliquis. She had watchman procedure in August. Has  "not had any issues with Afib since. She denies chest pain, heart palpitations.     CHF- patient is not consistent with monitoring her weight. She reports that she has not checked her weight since Autumn was in the home last 2 weeks ago. She went out of town for [employer] Has not taken her Torsemide in 5 days. She has difficulty following a low sodium diet. Does have LE edema. She admits to shortness of breath on exertion but denies feeling short of breath at rest. Cardiologist is . Pt reports that she needs to get lab work for cardiology so she can get her stress test done.     DM- uses freestyle vicente. Reports that she is monitoring glucose levels 3x daily. She reports that her glucose levels have been \"pretty high\". Freestyle log reviewed. Last time she checked her BG level was on 11/25/23. Pt reports that she is taking her Lantus but has not been taking her short acting insulin. She continues to eat high carb, snacks, junk food. Does not eat healthy diet. Does not exercise.    CKD- patient has rescheduled her follow up with Dr. Mitchell on 1/9/24. Last GFR on 10/18 was 21.     Home Visit:          Medically necessary due to: Illness or condition that results in activity lmitation or restriction that impacts the ability to leave home such as:, unsteady gait/poor balance, shortness of breath with exertion         Current Outpatient Medications:     albuterol 90 mcg/actuation inhaler, Inhale 2 puffs every 4 hours if needed., Disp: , Rfl:     amiodarone (Pacerone) 200 mg tablet, Take 1 tablet (200 mg) by mouth once daily with a meal., Disp: , Rfl:     amLODIPine (Norvasc) 5 mg tablet, Take 1 tablet (5 mg) by mouth once daily., Disp: , Rfl:     aspirin 81 mg EC tablet, Take 1 tablet (81 mg) by mouth once daily., Disp: , Rfl:     atorvastatin (Lipitor) 40 mg tablet, Take 1 tablet (40 mg) by mouth once daily., Disp: , Rfl:     BD Calista 2nd Gen Pen Needle 32 gauge x 5/32\" needle, USE SUBCUTANEOUSLY AS " DIRECTED TWICE DAILY, Disp: , Rfl:     clopidogrel (Plavix) 75 mg tablet, Take 1 tablet (75 mg) by mouth once daily., Disp: , Rfl:     ferrous gluconate 324 (38 Fe) MG tablet, Take 1 tablet (324 mg) by mouth once daily., Disp: , Rfl:     FreeStyle Shakir 14 Day Sensor kit, USE AS DIRECTED TO CHECK SUGARS 3 TO 4 TIMES DAILY, Disp: , Rfl:     FreeStyle Lite Strips strip, twice a day., Disp: , Rfl:     HumaLOG KwikPen Insulin 100 unit/mL injection, up to 15 units Subcutaneous three times a day per sliding scale, Disp: , Rfl:     ipratropium-albuteroL (Duo-Neb) 0.5-2.5 mg/3 mL nebulizer solution, Take 3 mL by nebulization every 6 hours if needed., Disp: , Rfl:     Lantus Solostar U-100 Insulin 100 unit/mL (3 mL) pen, Inject 14 Units under the skin once daily at bedtime., Disp: , Rfl:     levothyroxine (Synthroid, Levoxyl) 200 mcg tablet, Take 1 tablet (200 mcg) by mouth once daily in the morning. Take before meals., Disp: 90 tablet, Rfl: 3    levothyroxine (Synthroid, Levoxyl) 50 mcg tablet, Take 1 tablet (50 mcg) by mouth once daily in the morning. Take before meals., Disp: , Rfl:     metoprolol succinate XL (Toprol-XL) 50 mg 24 hr tablet, Take 1 tablet (50 mg) by mouth 2 times a day., Disp: , Rfl:     oxygen (O2) gas therapy, 2 l/min nasal cannula, Disp: , Rfl:     pantoprazole (ProtoNix) 40 mg EC tablet, Take 1 tablet (40 mg) by mouth 2 times a day., Disp: , Rfl:     potassium chloride CR 20 mEq ER tablet, Take 1 tablet (20 mEq) by mouth once daily. Take with food., Disp: , Rfl:     SITagliptin phosphate (Januvia) 100 mg tablet, Take 1 tablet (100 mg) by mouth once daily., Disp: , Rfl:     Soaanz 60 mg tablet, Take 1 tablet by mouth once daily., Disp: 30 tablet, Rfl: 3    venlafaxine XR (Effexor-XR) 75 mg 24 hr capsule, Take 1 capsule (75 mg) by mouth once daily., Disp: , Rfl:      Review of Systems  Constitutional: Positive for weight gain. Negative for appetite change, chills and fever.   HENT:  Negative for trouble  "swallowing.    Respiratory:  Positive for shortness of breath on exertion, intermittent wheezing, cough   Cardiovascular:  Positive for edema. Negative for chest pain, palpitations  Gastrointestinal:  Negative for abdominal pain, constipation, diarrhea, nausea and vomiting.   Endocrine: Positive for thyroid disease, diabetes  Genitourinary:  Negative for difficulty urinating.   Musculoskeletal:  Positive for gait problem, uses rollator  Neurological:  Positive for memory loss. Negative for dizziness and light-headedness.  Psychiatric/Behavioral: Positive for anxiety, depression    Objective   /74 (BP Location: Right arm, Patient Position: Sitting, BP Cuff Size: Large adult)   Pulse 60   Temp 36.4 °C (97.5 °F) (Temporal)   Resp 18   Ht 1.715 m (5' 7.5\")   Wt 83.9 kg (185 lb)   SpO2 95%   BMI 28.55 kg/m²     Physical Exam  Constitutional:       Appearance: Alert, disheveled. Normal appearance.      Comments: Sitting on rollator in living room.   HENT:      Head: Normocephalic and atraumatic.      Nose: Nose normal.      Mouth/Throat:      Mouth: Mucous membranes are moist.      Pharynx: Oropharynx is clear.   Eyes:      General:         Left eye: No discharge.      Extraocular Movements: Extraocular movements intact.      Pupils: Pupils are equal, round, and reactive to light.   Cardiovascular:      Rate and Rhythm: Normal rate and regular rhythm.      Pulses: Normal pulses.      Heart sounds: Normal heart sounds. 2+ LE edema, pitting, bilaterally  Pulmonary:      Effort: Pulmonary effort is normal. No respiratory distress.      Breath Lungs diminished. No wheezing, rales or rhonchi  Abdominal:      General: Bowel sounds are normal. There is no distension.      Palpations: Abdomen is soft.      Tenderness: There is no abdominal tenderness.   Musculoskeletal:      Cervical back: Neck supple.   Skin:     General: Skin is warm and dry.      Comments: diffuse scaling to LE, minimal weeping of " LLE  Neurological:      General: No focal deficit present.      Mental Status: She is alert and oriented to person, place, and time.   Psychiatric:         Mood and Affect: Mood normal.         Behavior: Behavior normal.     Assessment/Plan   Diagnoses and all orders for this visit:  Hypertensive heart and renal disease with (congestive) heart failure (CMS/HCC)  Comments:  chronic, continue Norvasc, Metoprolol and Torsemide  Paroxysmal atrial fibrillation (CMS/[facility])  Comments:  chronic, HR regular, controlled. Continue aspirin, amiodorone, plavix  Type 2 diabetes mellitus with hyperglycemia, with long-term current use of insulin (CMS/[facility])  Comments:  chronic, poor glucose control. continue januvia, lantus, humalog  Stage 4 chronic kidney disease (CMS/[facility])  Comments:  chronic, pt to follow up with Dr. Mitchell as scheduled  Smoker  Comments:  chronic, smoking cessation encouraged  Anxiety and depression  Comments:  chronic, pt very anxious. she is to follow up with [facility] as scheduled. visit to take place in patients home    Patient seen in home in no acute distress. However, was found to have continued issues with compliance of medications. She did not take Torsemide x 5 days. LE edema worsening. Advised pt that she needs to start taking her medications as prescribed or she will end up in the hospital with CHF exacerbation. Educated her to set an alarm on her phone reminding her to take her medications. Additionally, she needs to be monitoring her glucose levels and taking her insulin as prescribed. She has a freestyle vicente and there is no reason she should not be compliant monitoring BG levels multiple times a day. Will follow up with pt in 4 weeks as she is high risk for hospitalization. Total of 65 minutes spent face to face with pt with more than 50% spent on counseling, reviewing medical records and coordination of care     Poly Diaz, APRN-CNP

## 2023-12-04 NOTE — PROGRESS NOTES
House Calls CM follow up visit with provider. Pill boxes observed. Missed 5 days of medications in last 2 weeks. Stated she did take meds when away for Thanksgiving. Weight 185 today, indicating 5 pound weight gain since last visit. Increased BLE edema noted. Stressed importance of taking diuretic and all medications as prescribed. Inconsistent with checking BG as well. Continues to use [employer] CGM system. Per system reader, last BG check was 11/25/23. Readings in 200s and 300s. States she is taking Lantus regularly and using Humalog with BG checks. Discussed dietary choices and importance of checking BG regularly.   Has appointment with nephrology, Dr. Mitchell, 1/5/24. Cardiology to reschedule stress test after blood work obtained. Will follow up in 2 weeks.

## 2023-12-15 ENCOUNTER — LAB (OUTPATIENT)
Dept: LAB | Facility: LAB | Age: 68
End: 2023-12-15
Payer: MEDICARE

## 2023-12-15 DIAGNOSIS — I10 ESSENTIAL (PRIMARY) HYPERTENSION: ICD-10-CM

## 2023-12-15 DIAGNOSIS — I50.32 CHRONIC DIASTOLIC (CONGESTIVE) HEART FAILURE (MULTI): ICD-10-CM

## 2023-12-15 DIAGNOSIS — F17.200 NICOTINE DEPENDENCE, UNSPECIFIED, UNCOMPLICATED: ICD-10-CM

## 2023-12-15 DIAGNOSIS — J44.9 CHRONIC OBSTRUCTIVE PULMONARY DISEASE, UNSPECIFIED (MULTI): ICD-10-CM

## 2023-12-15 DIAGNOSIS — N28.9 DISORDER OF KIDNEY AND URETER, UNSPECIFIED: ICD-10-CM

## 2023-12-15 DIAGNOSIS — E78.49 OTHER HYPERLIPIDEMIA: ICD-10-CM

## 2023-12-15 DIAGNOSIS — I48.0 PAROXYSMAL ATRIAL FIBRILLATION (MULTI): Primary | ICD-10-CM

## 2023-12-15 DIAGNOSIS — E11.9 TYPE 2 DIABETES MELLITUS WITHOUT COMPLICATIONS (MULTI): ICD-10-CM

## 2023-12-15 LAB
ANION GAP SERPL CALC-SCNC: 10 MMOL/L (ref 10–20)
APTT PPP: 30 SECONDS (ref 27–38)
BUN SERPL-MCNC: 34 MG/DL (ref 6–23)
CALCIUM SERPL-MCNC: 7.8 MG/DL (ref 8.6–10.3)
CHLORIDE SERPL-SCNC: 102 MMOL/L (ref 98–107)
CO2 SERPL-SCNC: 31 MMOL/L (ref 21–32)
CREAT SERPL-MCNC: 2.4 MG/DL (ref 0.5–1.05)
ERYTHROCYTE [DISTWIDTH] IN BLOOD BY AUTOMATED COUNT: 13.5 % (ref 11.5–14.5)
GFR SERPL CREATININE-BSD FRML MDRD: 21 ML/MIN/1.73M*2
GLUCOSE SERPL-MCNC: 352 MG/DL (ref 74–99)
HCT VFR BLD AUTO: 33.8 % (ref 36–46)
HGB BLD-MCNC: 10.1 G/DL (ref 12–16)
INR PPP: 1 (ref 0.9–1.1)
MCH RBC QN AUTO: 29.9 PG (ref 26–34)
MCHC RBC AUTO-ENTMCNC: 29.9 G/DL (ref 32–36)
MCV RBC AUTO: 100 FL (ref 80–100)
NRBC BLD-RTO: 0 /100 WBCS (ref 0–0)
PLATELET # BLD AUTO: 309 X10*3/UL (ref 150–450)
POTASSIUM SERPL-SCNC: 4.2 MMOL/L (ref 3.5–5.3)
PROTHROMBIN TIME: 10.9 SECONDS (ref 9.8–12.8)
RBC # BLD AUTO: 3.38 X10*6/UL (ref 4–5.2)
SODIUM SERPL-SCNC: 139 MMOL/L (ref 136–145)
WBC # BLD AUTO: 6.7 X10*3/UL (ref 4.4–11.3)

## 2023-12-15 PROCEDURE — 36415 COLL VENOUS BLD VENIPUNCTURE: CPT

## 2023-12-19 ENCOUNTER — DOCUMENTATION (OUTPATIENT)
Dept: PRIMARY CARE | Facility: CLINIC | Age: 68
End: 2023-12-19
Payer: MEDICARE

## 2023-12-19 NOTE — PROGRESS NOTES
House Calls CM follow up in home visit 12/18/23. Improved med compliance noted over past 2 weeks. Pill boxes filled for another 2 weeks. Stated she had blood work drawn for heart cath procedure. Mental Health provider visit pending. Will follow up in 2 weeks.

## 2023-12-29 ENCOUNTER — DOCUMENTATION (OUTPATIENT)
Dept: PRIMARY CARE | Facility: CLINIC | Age: 68
End: 2023-12-29
Payer: MEDICARE

## 2023-12-29 NOTE — PROGRESS NOTES
"House Calls CM follow up in home visit. Patient reported she fell out of bed Wednesday,  and was on the floor for \"a while.\" She was unable to get up on her own and crawled into other room to call her friend who eventually was able to help her up. She has discomfort on right side and right arm. Stated she went to her chiropractor yesterday who did not think she broke any ribs. States she has increased weakness in her legs and difficulty with mobility.   Pill boxes noted. Had missed 7 days of meds in last 2 weeks, but says she thought she took them. Weight today 192 pounds, indicating a 7 pound weight gain since  provider visit. Stated she feels her legs are more swollen. Re-educated her on importance of taking meds as prescribed, which includes a daily diuretic. Again suggested an alarm on phone as reminder.  Has upcoming appointments with nephrology and cardiology but unsure of dates. Reported the new Freestyle Shakir sensor she just applied is not working. Box checked and sensor was  in July. Patient to call for refill of sensors.   Suggested the need for help in the home. She stated she has been connected to [employer] for housekeeping assistance, but is resistant to help with personal care. House Calls provider updated. Will follow up in 2 weeks.  "

## 2024-01-02 ENCOUNTER — DOCUMENTATION (OUTPATIENT)
Dept: PRIMARY CARE | Facility: CLINIC | Age: 69
End: 2024-01-02
Payer: MEDICARE

## 2024-01-02 DIAGNOSIS — D50.0 IRON DEFICIENCY ANEMIA DUE TO CHRONIC BLOOD LOSS: Primary | ICD-10-CM

## 2024-01-02 NOTE — PROGRESS NOTES
Spoke with patient. Stressed importance of weighing self daily and taking meds daily. Informed patient to call office for any continued weight gain per provider. Expressed understanding. Stated she has cardiology appointment this date. Will follow up with phone call.

## 2024-01-03 ENCOUNTER — TELEPHONE (OUTPATIENT)
Dept: PRIMARY CARE | Facility: CLINIC | Age: 69
End: 2024-01-03
Payer: MEDICARE

## 2024-01-03 RX ORDER — FERROUS GLUCONATE 324(38)MG
1 TABLET ORAL
Qty: 30 TABLET | Refills: 3 | Status: SHIPPED | OUTPATIENT
Start: 2024-01-03

## 2024-01-03 NOTE — TELEPHONE ENCOUNTER
Received fax regarding patients follow up with GREGORIA Pollock at Dr. Mensah office. Plan: Increase Torsemide to 60mg every day x 5 days, then resume 40mg every day dosing. This provider contacted Dr. Mensah office advising them that patient has been taking Torsemide 60mg daily since last hospitalization with CHF exacerbation. Requested a follow up call if there are any new orders for patient as she requires assistance with her pills and will need help with her pill box if there are changes.

## 2024-01-04 ENCOUNTER — TELEPHONE (OUTPATIENT)
Dept: PRIMARY CARE | Facility: CLINIC | Age: 69
End: 2024-01-04
Payer: MEDICARE

## 2024-01-04 DIAGNOSIS — K21.9 GASTROESOPHAGEAL REFLUX DISEASE WITHOUT ESOPHAGITIS: Primary | ICD-10-CM

## 2024-01-04 RX ORDER — PANTOPRAZOLE SODIUM 40 MG/1
40 TABLET, DELAYED RELEASE ORAL 2 TIMES DAILY
Qty: 60 TABLET | Refills: 6 | Status: SHIPPED | OUTPATIENT
Start: 2024-01-04

## 2024-01-05 ENCOUNTER — TELEPHONE (OUTPATIENT)
Dept: PRIMARY CARE | Facility: CLINIC | Age: 69
End: 2024-01-05

## 2024-01-05 ENCOUNTER — OFFICE VISIT (OUTPATIENT)
Dept: PRIMARY CARE | Facility: CLINIC | Age: 69
End: 2024-01-05
Payer: MEDICARE

## 2024-01-05 VITALS
HEART RATE: 85 BPM | SYSTOLIC BLOOD PRESSURE: 124 MMHG | BODY MASS INDEX: 30.09 KG/M2 | DIASTOLIC BLOOD PRESSURE: 64 MMHG | TEMPERATURE: 98.4 F | OXYGEN SATURATION: 93 % | WEIGHT: 195 LBS | RESPIRATION RATE: 18 BRPM

## 2024-01-05 DIAGNOSIS — R07.81 RIB PAIN ON RIGHT SIDE: Primary | ICD-10-CM

## 2024-01-05 DIAGNOSIS — I48.0 PAROXYSMAL ATRIAL FIBRILLATION (MULTI): ICD-10-CM

## 2024-01-05 DIAGNOSIS — R07.81 RIB PAIN: Primary | ICD-10-CM

## 2024-01-05 DIAGNOSIS — E11.65 TYPE 2 DIABETES MELLITUS WITH HYPERGLYCEMIA, WITH LONG-TERM CURRENT USE OF INSULIN (MULTI): ICD-10-CM

## 2024-01-05 DIAGNOSIS — D50.0 IRON DEFICIENCY ANEMIA DUE TO CHRONIC BLOOD LOSS: ICD-10-CM

## 2024-01-05 DIAGNOSIS — Z79.4 TYPE 2 DIABETES MELLITUS WITH HYPERGLYCEMIA, WITH LONG-TERM CURRENT USE OF INSULIN (MULTI): ICD-10-CM

## 2024-01-05 DIAGNOSIS — I50.43 ACUTE ON CHRONIC COMBINED SYSTOLIC AND DIASTOLIC HEART FAILURE (MULTI): ICD-10-CM

## 2024-01-05 DIAGNOSIS — N18.32 STAGE 3B CHRONIC KIDNEY DISEASE (CKD) (MULTI): ICD-10-CM

## 2024-01-05 PROCEDURE — 1159F MED LIST DOCD IN RCRD: CPT | Performed by: NURSE PRACTITIONER

## 2024-01-05 PROCEDURE — 1125F AMNT PAIN NOTED PAIN PRSNT: CPT | Performed by: NURSE PRACTITIONER

## 2024-01-05 PROCEDURE — 99349 HOME/RES VST EST MOD MDM 40: CPT | Performed by: NURSE PRACTITIONER

## 2024-01-05 PROCEDURE — 3078F DIAST BP <80 MM HG: CPT | Performed by: NURSE PRACTITIONER

## 2024-01-05 PROCEDURE — 3066F NEPHROPATHY DOC TX: CPT | Performed by: NURSE PRACTITIONER

## 2024-01-05 PROCEDURE — 3074F SYST BP LT 130 MM HG: CPT | Performed by: NURSE PRACTITIONER

## 2024-01-05 PROCEDURE — 1160F RVW MEDS BY RX/DR IN RCRD: CPT | Performed by: NURSE PRACTITIONER

## 2024-01-05 ASSESSMENT — PAIN SCALES - GENERAL: PAINLEVEL: 10-WORST PAIN EVER

## 2024-01-05 NOTE — TELEPHONE ENCOUNTER
Patient calls stating she saw Dr. Doss, the chiropractor today who encouraged her to get her ribs x-rayed.  Patient requesting provider enter orders for rib x-rays as discussed at last appointment.

## 2024-01-05 NOTE — H&P (VIEW-ONLY)
"Subjective   Patient ID: Daphne Morris is a 68 y.o. female who presents for Follow-up (DM, HF, Anemia, CKD).    Visit for 69 y/o female seen today in private home, alone for routine follow up of multiple medical issues. Patient is sitting at kitchen table this morning. She is alert, oriented, able to answer simple questions regarding her health. She is an overall poor historian. Has difficulty with her memory and management of her medications. She remains active with house calls BRIE Leyva who continues to fill a pill box for her. She does have continued issues with compliance. Pt lives in a mobile home by herself. The home is very unkept. She is trying to clean it and  the garbage throughout the house. Patient does not drive or have a vehicle. Her friend Doug has been providing transportation to any necessary medical appointments. Patient reports that she went to see Dr. Peterson on 1/2. She was advised to increase her Torsemide to 80mg x 3 days due to LE edema with weight gain. Pt is non compliant with her sodium intake. She does not consistently weigh herself. She admits to shortness of breath, mostly with exertion. She has oxygen in the home and uses it when lying down. She does not typically use it during the day. Patient is able to prepare simple meals for herself. She is able to perform all ADLs without assistance. She denies appetite changes, abdominal pain, nausea, vomiting. Denies bowel or bladder concerns. Remains ambulatory with rollator. Has history of frequent falls. She reports having a fall on Monday. She had her life alert necklace on but didn't press it because she \"was in her underwear\". She reports being on the ground for 2 hours because she had to crawl to her phone. She did not go to the emergency room for evaluation. She also reports that she was going up her steps yesterday. They were icy, she started to fall and caught herself on the railing but injured her right ribs.     Afib- " "patient continues on Aspirin and Plavix. No longer on Eliquis. Denies chest pain, heart palpitations.     DM- uses freestyle shakir. Reports that she is monitoring her glucose levels \"before I eat\". She is waiting on a new freestyle sensor because the one that was sent by the pharmacy was . Pt continues to eat high carb, snacks, junk food. Does not eat healthy diet. Does not exercise.    CKD- patient is scheduled to follow up with Dr. Mitchell on . Her last GFR was 21 on 12/15/23.     Anemia- chronic, managed with ferrous gluconate. Last H/H was 10.1 / 33.8    Home Visit:          Medically necessary due to: Illness or condition that results in activity lmitation or restriction that impacts the ability to leave home such as:, unsteady gait/poor balance, shortness of breath with exertion         Current Outpatient Medications:     albuterol 90 mcg/actuation inhaler, Inhale 2 puffs every 4 hours if needed., Disp: , Rfl:     amiodarone (Pacerone) 200 mg tablet, Take 1 tablet (200 mg) by mouth once daily with a meal., Disp: , Rfl:     amLODIPine (Norvasc) 5 mg tablet, Take 1 tablet (5 mg) by mouth once daily., Disp: , Rfl:     aspirin 81 mg EC tablet, Take 1 tablet (81 mg) by mouth once daily., Disp: , Rfl:     atorvastatin (Lipitor) 40 mg tablet, Take 1 tablet (40 mg) by mouth once daily., Disp: , Rfl:     BD Calista 2nd Gen Pen Needle 32 gauge x \" needle, USE SUBCUTANEOUSLY AS DIRECTED TWICE DAILY, Disp: , Rfl:     clopidogrel (Plavix) 75 mg tablet, Take 1 tablet (75 mg) by mouth once daily., Disp: , Rfl:     ferrous gluconate 324 (38 Fe) MG tablet, Take 1 tablet (324 mg) by mouth once daily with breakfast., Disp: 30 tablet, Rfl: 3    FreeStyle Shakir 14 Day Sensor kit, USE AS DIRECTED TO CHECK SUGARS 3 TO 4 TIMES DAILY, Disp: , Rfl:     FreeStyle Lite Strips strip, twice a day., Disp: , Rfl:     HumaLOG KwikPen Insulin 100 unit/mL injection, up to 15 units Subcutaneous three times a day per sliding scale, " Disp: , Rfl:     ipratropium-albuteroL (Duo-Neb) 0.5-2.5 mg/3 mL nebulizer solution, Take 3 mL by nebulization every 6 hours if needed., Disp: , Rfl:     Lantus Solostar U-100 Insulin 100 unit/mL (3 mL) pen, Inject 14 Units under the skin once daily at bedtime., Disp: , Rfl:     levothyroxine (Synthroid, Levoxyl) 200 mcg tablet, Take 1 tablet (200 mcg) by mouth once daily in the morning. Take before meals., Disp: 90 tablet, Rfl: 3    levothyroxine (Synthroid, Levoxyl) 50 mcg tablet, Take 1 tablet (50 mcg) by mouth once daily in the morning. Take before meals., Disp: , Rfl:     metoprolol succinate XL (Toprol-XL) 50 mg 24 hr tablet, Take 1 tablet (50 mg) by mouth 2 times a day., Disp: , Rfl:     oxygen (O2) gas therapy, 2 l/min nasal cannula, Disp: , Rfl:     pantoprazole (ProtoNix) 40 mg EC tablet, Take 1 tablet (40 mg) by mouth 2 times a day., Disp: 60 tablet, Rfl: 6    potassium chloride CR 20 mEq ER tablet, Take 1 tablet (20 mEq) by mouth once daily. Take with food., Disp: , Rfl:     SITagliptin phosphate (Januvia) 100 mg tablet, Take 1 tablet (100 mg) by mouth once daily., Disp: , Rfl:     Soaanz 60 mg tablet, Take 1 tablet by mouth once daily., Disp: 30 tablet, Rfl: 3    venlafaxine XR (Effexor-XR) 75 mg 24 hr capsule, Take 1 capsule (75 mg) by mouth once daily., Disp: , Rfl:      Review of Systems  Constitutional: Positive for weight gain. Negative for appetite change, chills and fever.   HENT:  Negative for trouble swallowing.    Respiratory:  Positive for shortness of breath, intermittent wheezing, cough   Cardiovascular:  Positive for edema. Negative for chest pain, palpitations  Gastrointestinal:  Negative for abdominal pain, constipation, diarrhea, nausea and vomiting.   Endocrine: Positive for thyroid disease, diabetes  Genitourinary:  Negative for difficulty urinating.   Musculoskeletal:  Positive for gait problem, uses walker, frequent falls  Neurological:  Positive for memory loss. Negative for  dizziness and light-headedness.  Psychiatric/Behavioral: Positive for anxiety, depression    Objective   /64 (BP Location: Left arm, Patient Position: Sitting, BP Cuff Size: Adult)   Pulse 85   Temp 36.9 °C (98.4 °F) (Temporal)   Resp 18   Wt 88.5 kg (195 lb)   SpO2 93%   BMI 30.09 kg/m²     Physical Exam  Constitutional:       Appearance: Alert, disheveled. Normal appearance.      Comments: Sitting at kitchen table, no acute distress  HENT:      Head: Normocephalic and atraumatic.      Nose: Nose normal.      Mouth/Throat:      Mouth: Mucous membranes are moist.      Pharynx: Oropharynx is clear.   Eyes:      General:         Left eye: No discharge.      Extraocular Movements: Extraocular movements intact.      Pupils: Pupils are equal, round, and reactive to light.   Cardiovascular:      Rate and Rhythm: Normal rate and regular rhythm.      Pulses: Normal pulses.      Heart sounds: Normal heart sounds. 2+ LE edema, pitting, bilaterally  Pulmonary:      Effort: Pulmonary effort is normal. No respiratory distress.      Breath Lungs diminished. No wheezing, rales or rhonchi  Abdominal:      General: Bowel sounds are normal. There is no distension.      Palpations: Abdomen is soft.      Tenderness: There is no abdominal tenderness.   Musculoskeletal:      Cervical back: Neck supple.   Skin:     General: Skin is warm and dry.      Comments: diffuse scaling to LE, minimal weeping of LLE  Neurological:      General: No focal deficit present.      Mental Status: She is alert and oriented to person, place, and time.   Psychiatric:         Mood and Affect: Mood normal.         Behavior: Behavior normal.     Assessment/Plan   Diagnoses and all orders for this visit:  Rib pain on right side  Comments:  acute, s/p injury. Recommend xray. Pt declines  Acute on chronic combined systolic and diastolic heart failure (CMS/HCC)  Comments:  chronic, worsening edema. Pt to take the extra Torsemide as instructed by  cardiology team. She is to weigh herself DAILY, limit sodum and fluid intake  Paroxysmal atrial fibrillation (CMS/[facility])  Comments:  patient HR regular, rate controlled. watchman sucessful. Continue amiodorone  Type 2 diabetes mellitus with hyperglycemia, with long-term current use of insulin (CMS/[facility])  Comments:  chronic, continues to follow unhealthy diabetic diet. Continue humalog, lantus, januvia  Stage 3b chronic kidney disease (CKD) (CMS/[facility])  Comments:  chronic, pt to follow with dr. van as scheduled  Iron deficiency anemia due to chronic blood loss  Comments:  chronic, continue ferrous gluconate    Patient delicately stable. She continues to be very non compliant with her medications and diet habits. Discussed the importance of reducing sodium due to CHF and carb intake due to diabetes. Advised her to weigh herself daily and notify cardiology with any weight gain of more than 2lb/24 hour or 5lb in 1 week. Will follow up with pt in 4 weeks as she is high risk for hospitalization       Poly Diaz, APRN-CNP

## 2024-01-05 NOTE — PROGRESS NOTES
"Subjective   Patient ID: Daphne Morris is a 68 y.o. female who presents for Follow-up (DM, HF, Anemia, CKD).    Visit for 69 y/o female seen today in private home, alone for routine follow up of multiple medical issues. Patient is sitting at kitchen table this morning. She is alert, oriented, able to answer simple questions regarding her health. She is an overall poor historian. Has difficulty with her memory and management of her medications. She remains active with house calls BRIE Leyva who continues to fill a pill box for her. She does have continued issues with compliance. Pt lives in a mobile home by herself. The home is very unkept. She is trying to clean it and  the garbage throughout the house. Patient does not drive or have a vehicle. Her friend Doug has been providing transportation to any necessary medical appointments. Patient reports that she went to see Dr. Peterson on 1/2. She was advised to increase her Torsemide to 80mg x 3 days due to LE edema with weight gain. Pt is non compliant with her sodium intake. She does not consistently weigh herself. She admits to shortness of breath, mostly with exertion. She has oxygen in the home and uses it when lying down. She does not typically use it during the day. Patient is able to prepare simple meals for herself. She is able to perform all ADLs without assistance. She denies appetite changes, abdominal pain, nausea, vomiting. Denies bowel or bladder concerns. Remains ambulatory with rollator. Has history of frequent falls. She reports having a fall on Monday. She had her life alert necklace on but didn't press it because she \"was in her underwear\". She reports being on the ground for 2 hours because she had to crawl to her phone. She did not go to the emergency room for evaluation. She also reports that she was going up her steps yesterday. They were icy, she started to fall and caught herself on the railing but injured her right ribs.     Afib- " "patient continues on Aspirin and Plavix. No longer on Eliquis. Denies chest pain, heart palpitations.     DM- uses freestyle shakir. Reports that she is monitoring her glucose levels \"before I eat\". She is waiting on a new freestyle sensor because the one that was sent by the pharmacy was . Pt continues to eat high carb, snacks, junk food. Does not eat healthy diet. Does not exercise.    CKD- patient is scheduled to follow up with Dr. Mitchell on . Her last GFR was 21 on 12/15/23.     Anemia- chronic, managed with ferrous gluconate. Last H/H was 10.1 / 33.8    Home Visit:          Medically necessary due to: Illness or condition that results in activity lmitation or restriction that impacts the ability to leave home such as:, unsteady gait/poor balance, shortness of breath with exertion         Current Outpatient Medications:     albuterol 90 mcg/actuation inhaler, Inhale 2 puffs every 4 hours if needed., Disp: , Rfl:     amiodarone (Pacerone) 200 mg tablet, Take 1 tablet (200 mg) by mouth once daily with a meal., Disp: , Rfl:     amLODIPine (Norvasc) 5 mg tablet, Take 1 tablet (5 mg) by mouth once daily., Disp: , Rfl:     aspirin 81 mg EC tablet, Take 1 tablet (81 mg) by mouth once daily., Disp: , Rfl:     atorvastatin (Lipitor) 40 mg tablet, Take 1 tablet (40 mg) by mouth once daily., Disp: , Rfl:     BD Calista 2nd Gen Pen Needle 32 gauge x \" needle, USE SUBCUTANEOUSLY AS DIRECTED TWICE DAILY, Disp: , Rfl:     clopidogrel (Plavix) 75 mg tablet, Take 1 tablet (75 mg) by mouth once daily., Disp: , Rfl:     ferrous gluconate 324 (38 Fe) MG tablet, Take 1 tablet (324 mg) by mouth once daily with breakfast., Disp: 30 tablet, Rfl: 3    FreeStyle Shakir 14 Day Sensor kit, USE AS DIRECTED TO CHECK SUGARS 3 TO 4 TIMES DAILY, Disp: , Rfl:     FreeStyle Lite Strips strip, twice a day., Disp: , Rfl:     HumaLOG KwikPen Insulin 100 unit/mL injection, up to 15 units Subcutaneous three times a day per sliding scale, " Disp: , Rfl:     ipratropium-albuteroL (Duo-Neb) 0.5-2.5 mg/3 mL nebulizer solution, Take 3 mL by nebulization every 6 hours if needed., Disp: , Rfl:     Lantus Solostar U-100 Insulin 100 unit/mL (3 mL) pen, Inject 14 Units under the skin once daily at bedtime., Disp: , Rfl:     levothyroxine (Synthroid, Levoxyl) 200 mcg tablet, Take 1 tablet (200 mcg) by mouth once daily in the morning. Take before meals., Disp: 90 tablet, Rfl: 3    levothyroxine (Synthroid, Levoxyl) 50 mcg tablet, Take 1 tablet (50 mcg) by mouth once daily in the morning. Take before meals., Disp: , Rfl:     metoprolol succinate XL (Toprol-XL) 50 mg 24 hr tablet, Take 1 tablet (50 mg) by mouth 2 times a day., Disp: , Rfl:     oxygen (O2) gas therapy, 2 l/min nasal cannula, Disp: , Rfl:     pantoprazole (ProtoNix) 40 mg EC tablet, Take 1 tablet (40 mg) by mouth 2 times a day., Disp: 60 tablet, Rfl: 6    potassium chloride CR 20 mEq ER tablet, Take 1 tablet (20 mEq) by mouth once daily. Take with food., Disp: , Rfl:     SITagliptin phosphate (Januvia) 100 mg tablet, Take 1 tablet (100 mg) by mouth once daily., Disp: , Rfl:     Soaanz 60 mg tablet, Take 1 tablet by mouth once daily., Disp: 30 tablet, Rfl: 3    venlafaxine XR (Effexor-XR) 75 mg 24 hr capsule, Take 1 capsule (75 mg) by mouth once daily., Disp: , Rfl:      Review of Systems  Constitutional: Positive for weight gain. Negative for appetite change, chills and fever.   HENT:  Negative for trouble swallowing.    Respiratory:  Positive for shortness of breath, intermittent wheezing, cough   Cardiovascular:  Positive for edema. Negative for chest pain, palpitations  Gastrointestinal:  Negative for abdominal pain, constipation, diarrhea, nausea and vomiting.   Endocrine: Positive for thyroid disease, diabetes  Genitourinary:  Negative for difficulty urinating.   Musculoskeletal:  Positive for gait problem, uses walker, frequent falls  Neurological:  Positive for memory loss. Negative for  dizziness and light-headedness.  Psychiatric/Behavioral: Positive for anxiety, depression    Objective   /64 (BP Location: Left arm, Patient Position: Sitting, BP Cuff Size: Adult)   Pulse 85   Temp 36.9 °C (98.4 °F) (Temporal)   Resp 18   Wt 88.5 kg (195 lb)   SpO2 93%   BMI 30.09 kg/m²     Physical Exam  Constitutional:       Appearance: Alert, disheveled. Normal appearance.      Comments: Sitting at kitchen table, no acute distress  HENT:      Head: Normocephalic and atraumatic.      Nose: Nose normal.      Mouth/Throat:      Mouth: Mucous membranes are moist.      Pharynx: Oropharynx is clear.   Eyes:      General:         Left eye: No discharge.      Extraocular Movements: Extraocular movements intact.      Pupils: Pupils are equal, round, and reactive to light.   Cardiovascular:      Rate and Rhythm: Normal rate and regular rhythm.      Pulses: Normal pulses.      Heart sounds: Normal heart sounds. 2+ LE edema, pitting, bilaterally  Pulmonary:      Effort: Pulmonary effort is normal. No respiratory distress.      Breath Lungs diminished. No wheezing, rales or rhonchi  Abdominal:      General: Bowel sounds are normal. There is no distension.      Palpations: Abdomen is soft.      Tenderness: There is no abdominal tenderness.   Musculoskeletal:      Cervical back: Neck supple.   Skin:     General: Skin is warm and dry.      Comments: diffuse scaling to LE, minimal weeping of LLE  Neurological:      General: No focal deficit present.      Mental Status: She is alert and oriented to person, place, and time.   Psychiatric:         Mood and Affect: Mood normal.         Behavior: Behavior normal.     Assessment/Plan   Diagnoses and all orders for this visit:  Rib pain on right side  Comments:  acute, s/p injury. Recommend xray. Pt declines  Acute on chronic combined systolic and diastolic heart failure (CMS/HCC)  Comments:  chronic, worsening edema. Pt to take the extra Torsemide as instructed by  cardiology team. She is to weigh herself DAILY, limit sodum and fluid intake  Paroxysmal atrial fibrillation (CMS/[facility])  Comments:  patient HR regular, rate controlled. watchman sucessful. Continue amiodorone  Type 2 diabetes mellitus with hyperglycemia, with long-term current use of insulin (CMS/[facility])  Comments:  chronic, continues to follow unhealthy diabetic diet. Continue humalog, lantus, januvia  Stage 3b chronic kidney disease (CKD) (CMS/[facility])  Comments:  chronic, pt to follow with dr. van as scheduled  Iron deficiency anemia due to chronic blood loss  Comments:  chronic, continue ferrous gluconate    Patient delicately stable. She continues to be very non compliant with her medications and diet habits. Discussed the importance of reducing sodium due to CHF and carb intake due to diabetes. Advised her to weigh herself daily and notify cardiology with any weight gain of more than 2lb/24 hour or 5lb in 1 week. Will follow up with pt in 4 weeks as she is high risk for hospitalization       Poly Diaz, APRN-CNP

## 2024-01-09 ENCOUNTER — TELEPHONE (OUTPATIENT)
Dept: PRIMARY CARE | Facility: CLINIC | Age: 69
End: 2024-01-09
Payer: MEDICARE

## 2024-01-09 NOTE — TELEPHONE ENCOUNTER
Pt left message on office machine stating she needed Dr. Mitchell's office number to cancel her appt. Pt stated she was not feeling well and has lost vision out of one eye. I called pt back and left a message leaving number and requesting return call for symptoms.

## 2024-01-12 ENCOUNTER — DOCUMENTATION (OUTPATIENT)
Dept: PRIMARY CARE | Facility: CLINIC | Age: 69
End: 2024-01-12
Payer: MEDICARE

## 2024-01-12 DIAGNOSIS — I50.43 ACUTE ON CHRONIC COMBINED SYSTOLIC AND DIASTOLIC HEART FAILURE (MULTI): Primary | ICD-10-CM

## 2024-01-12 DIAGNOSIS — I48.0 PAROXYSMAL ATRIAL FIBRILLATION (MULTI): ICD-10-CM

## 2024-01-12 RX ORDER — METOPROLOL SUCCINATE 50 MG/1
50 TABLET, EXTENDED RELEASE ORAL 2 TIMES DAILY
Qty: 180 TABLET | Refills: 1 | Status: SHIPPED | OUTPATIENT
Start: 2024-01-12 | End: 2024-03-05 | Stop reason: HOSPADM

## 2024-01-12 RX ORDER — POTASSIUM CHLORIDE 1500 MG/1
20 TABLET, EXTENDED RELEASE ORAL DAILY
Qty: 90 TABLET | Refills: 1 | Status: SHIPPED | OUTPATIENT
Start: 2024-01-12 | End: 2024-03-05 | Stop reason: HOSPADM

## 2024-01-12 NOTE — PROGRESS NOTES
"House Calls CM follow up in home visit. Using rollator in home with decreased strength and mobility noted. Had difficulty standing unassisted on scale. Weight 188 today. Decreased BLE edema noted. States she has been more forgetful and reports \"seeing things and people that aren't there.\" Improved med compliance noted over past 2 weeks. Still does not have sensors for Freestyle Shakir and is not checking BG. Stated \"I'm playing it by hear.\" Re-educated patient on importance of checking BG levels and limiting sodium and sugar in diet.  Stated her friend cancelled the appointment with Dr. Mitchell and rescheduled but she does not know when. Reported another fall from bed. She had to crawl on floor and then call the rescue squad to help her up. Stated her friends and nephew are checking on her daily. She received a call regarding mental health referral and has an in home visit scheduled for 1/31. Refills requested from [facility] pharmacy during visit.   Provider updated via phone. Will follow up in one week.  "

## 2024-01-22 ENCOUNTER — HOSPITAL ENCOUNTER (OUTPATIENT)
Facility: HOSPITAL | Age: 69
Setting detail: OUTPATIENT SURGERY
Discharge: HOME | End: 2024-01-26
Attending: INTERNAL MEDICINE | Admitting: INTERNAL MEDICINE
Payer: MEDICARE

## 2024-01-22 DIAGNOSIS — I20.89 ANGINAL EQUIVALENT (CMS-HCC): ICD-10-CM

## 2024-01-22 NOTE — H&P
History Of Present Illness  Daphne Morris is a 68 y.o. female presenting with HF here for coronary angiography.     Past Medical History  Past Medical History:   Diagnosis Date    Acute CHF (CMS/[facility])     2023    Arthritis     Atrial fibrillation (CMS/[facility])     Chronic diastolic CHF (congestive heart failure) (CMS/[facility])     COPD (chronic obstructive pulmonary disease) (CMS/[facility])     CVA (cerebral vascular accident) (CMS/[facility])     - righ sided weakness    Depression     Diabetes (CMS/[facility])     Essential tremor     HTN (hypertension)     Hyperlipidemia     Hypothyroidism     s/p radioactive iodine treatment    Impacted cerumen, left ear     Impacted cerumen of left ear    Left ventricular ejection fraction greater than 40%     40-45%    Noncompliance     Personal history of other diseases of the respiratory system     History of acute bronchitis    Renal carcinoma, right (CMS/[facility])     s/p partial nephrectomy 2021    Vitamin D deficiency        Surgical History  Past Surgical History:   Procedure Laterality Date    ANKLE SURGERY      2013    CATARACT EXTRACTION Right     2019     SECTION, CLASSIC      MR CHEST ANGIO W AND WO IV CONTRAST  2023    MR CHEST ANGIO W AND WO IV CONTRAST 2023 [facility]    MR HEAD ANGIO WO IV CONTRAST  2021    MR HEAD ANGIO WO IV CONTRAST [facility]    NEPHRECTOMY  2021    SHOULDER SURGERY              Social History  She reports that she has been smoking cigarettes. She started smoking about 55 years ago. She has been smoking an average of .5 packs per day. She has never been exposed to tobacco smoke. She has never used smokeless tobacco. She reports that she does not currently use alcohol. She reports current drug use. Drug: Marijuana.    Family History  Family History   Problem Relation Name Age of Onset    Hypertension Mother      Diabetes Father      Heart disease Father      Cancer Sister      Cancer Brother      Diabetes Daughter       Asthma Daughter      Heart attack Daughter      Diabetes Maternal Grandfather      Heart disease Paternal Grandmother          Allergies  Adhesive tape-silicones, Amoxicillin, Bee venom protein (honey bee), Codeine, Doxycycline, Latex, Lisinopril, Prednisone, Citalopram, Oseltamivir, and Phenyleph-shark oil-glyc-pet    Review of Systems   All other systems reviewed and are negative.       Physical Exam     Last Recorded Vitals  There were no vitals taken for this visit.    Relevant Results        See office and chart notes for details of prior HF COPD AF      Assessment/Plan   Active Problems:  There are no active Hospital Problems.      PROCEED WITH CORONARY ANGIOGRAPHY CONSENT OBTAINED       I spent 20 minutes in the professional and overall care of this patient.      Nicholas Antonio MD

## 2024-01-29 ENCOUNTER — HOSPITAL ENCOUNTER (EMERGENCY)
Facility: HOSPITAL | Age: 69
Discharge: HOME | End: 2024-01-29
Payer: MEDICARE

## 2024-01-29 VITALS
TEMPERATURE: 97.1 F | HEIGHT: 66 IN | HEART RATE: 53 BPM | RESPIRATION RATE: 18 BRPM | DIASTOLIC BLOOD PRESSURE: 86 MMHG | WEIGHT: 197 LBS | OXYGEN SATURATION: 100 % | SYSTOLIC BLOOD PRESSURE: 115 MMHG | BODY MASS INDEX: 31.66 KG/M2

## 2024-01-29 DIAGNOSIS — S81.812A NONINFECTED SKIN TEAR OF LEFT LOWER EXTREMITY, INITIAL ENCOUNTER: Primary | ICD-10-CM

## 2024-01-29 PROCEDURE — 99283 EMERGENCY DEPT VISIT LOW MDM: CPT

## 2024-01-29 PROCEDURE — 99285 EMERGENCY DEPT VISIT HI MDM: CPT

## 2024-01-29 ASSESSMENT — PAIN SCALES - GENERAL
PAINLEVEL_OUTOF10: 0 - NO PAIN
PAINLEVEL_OUTOF10: 2

## 2024-01-29 ASSESSMENT — PAIN - FUNCTIONAL ASSESSMENT: PAIN_FUNCTIONAL_ASSESSMENT: 0-10

## 2024-01-29 ASSESSMENT — COLUMBIA-SUICIDE SEVERITY RATING SCALE - C-SSRS
1. IN THE PAST MONTH, HAVE YOU WISHED YOU WERE DEAD OR WISHED YOU COULD GO TO SLEEP AND NOT WAKE UP?: NO
6. HAVE YOU EVER DONE ANYTHING, STARTED TO DO ANYTHING, OR PREPARED TO DO ANYTHING TO END YOUR LIFE?: NO
2. HAVE YOU ACTUALLY HAD ANY THOUGHTS OF KILLING YOURSELF?: NO

## 2024-01-29 NOTE — ED PROVIDER NOTES
HPI   Chief Complaint   Patient presents with    Fall     Pt brought in via squad for fall out of bed, pt states she rolled out of bed while asleep, denies hitting head or LOC, denies any pain, has laceration to left knee, dressing dry and intact from EMS, pt on blood thinners       Patient was asleep tonight and rolled out of bed accidentally.  She is not sure why but I suggested probably a dream.  She has done this before but never hurt herself before.  Tonight she fell and did cut her left leg just above the knee.  No other injuries but we did call a trauma alert.  She is on blood thinners so she went for scans of head C-spine chest abdomen pelvis, none of which she was able to do, since she is very claustrophobic and cannot lie flat.  After assessment I decided that she probably did not need all of the radiation so we have undressed this wound on her left lower extremity and found about a 1 and half inch diameter skin tear that only needs Steri-Strips.  She does not care to have a tetanus shot tonight.                        [place] Coma Scale Score: 15                  Patient History   Past Medical History:   Diagnosis Date    Acute CHF (CMS/[facility])     04/2023    Arthritis     Atrial fibrillation (CMS/[facility])     Chronic diastolic CHF (congestive heart failure) (CMS/[facility])     COPD (chronic obstructive pulmonary disease) (CMS/[facility])     CVA (cerebral vascular accident) (CMS/[facility])     2021- righ sided weakness    Depression     Diabetes (CMS/[facility])     Essential tremor     HTN (hypertension)     Hyperlipidemia     Hypothyroidism     s/p radioactive iodine treatment    Impacted cerumen, left ear     Impacted cerumen of left ear    Left ventricular ejection fraction greater than 40%     40-45%    Noncompliance     Personal history of other diseases of the respiratory system     History of acute bronchitis    Renal carcinoma, right (CMS/[facility])     s/p partial nephrectomy 8/2021    Vitamin D deficiency      Past Surgical  History:   Procedure Laterality Date    ANKLE SURGERY      2013    CATARACT EXTRACTION Right     2019     SECTION, CLASSIC      MR CHEST ANGIO W AND WO IV CONTRAST  2023    MR CHEST ANGIO W AND WO IV CONTRAST 2023 [facility]    MR HEAD ANGIO WO IV CONTRAST  2021    MR HEAD ANGIO WO IV CONTRAST [facility]    NEPHRECTOMY  2021    SHOULDER SURGERY      2009     Family History   Problem Relation Name Age of Onset    Hypertension Mother      Diabetes Father      Heart disease Father      Cancer Sister      Cancer Brother      Diabetes Daughter      Asthma Daughter      Heart attack Daughter      Diabetes Maternal Grandfather      Heart disease Paternal Grandmother       Social History     Tobacco Use    Smoking status: Every Day     Packs/day: .5     Types: Cigarettes     Start date: 1969     Passive exposure: Never    Smokeless tobacco: Never   Substance Use Topics    Alcohol use: Not Currently    Drug use: Yes     Types: Marijuana       Physical Exam   ED Triage Vitals [24 0320]   Temperature Heart Rate Respirations BP   36.2 °C (97.1 °F) 53 18 115/86      Pulse Ox Temp Source Heart Rate Source Patient Position   100 % Temporal Monitor Lying      BP Location FiO2 (%)     Right arm 28 %       Physical Exam  Vitals and nursing note reviewed.   HENT:      Head: Normocephalic and atraumatic.      Right Ear: Tympanic membrane and ear canal normal.      Left Ear: Tympanic membrane and ear canal normal.      Nose: Nose normal.      Mouth/Throat:      Mouth: Mucous membranes are moist.   Cardiovascular:      Rate and Rhythm: Normal rate.   Pulmonary:      Effort: Pulmonary effort is normal.      Breath sounds: Normal breath sounds.   Abdominal:      General: Abdomen is flat.      Palpations: Abdomen is soft.   Musculoskeletal:         General: Normal range of motion.      Cervical back: Normal range of motion.   Skin:     General: Skin is warm and dry.      Comments:  About 2 inches above the left knee there is a 2 inch diameter skin tear without any laceration to it.  There is no current bleeding.  This should require covering the fresh granulation tissue with the flap and placing Steri-Strips to hold it in place.   Neurological:      General: No focal deficit present.      Mental Status: She is alert.         ED Course & MDM   Diagnoses as of 01/29/24 3540   Noninfected skin tear of left lower extremity, initial encounter       Medical Decision Making  Her overall physical exam is unremarkable except for the skin tear on the left leg.  There is no sign of any injury to the head neck chest abdomen or pelvis.  There is no knee or hip tenderness.  We are going to clean the skin tear area and try to tack down the flap with Steri-Strips to give this some protection while it heals.  She will need to follow-up with her primary care physician in the next few days to make sure it is healing well.        Procedure  Procedures     Seth Bone MD  01/29/24 7839       Seth Bone MD  01/29/24 9843

## 2024-01-29 NOTE — DISCHARGE INSTRUCTIONS
Be sure to make an appointment to see your family physician in the next 4 to 5 days to make sure this is healing well.  If there is any problem with that such as increased pain or redness/swelling/drainage, return to the emergency department for us to reassess.

## 2024-01-30 ENCOUNTER — TELEPHONE (OUTPATIENT)
Dept: PRIMARY CARE | Facility: CLINIC | Age: 69
End: 2024-01-30
Payer: MEDICARE

## 2024-01-30 NOTE — TELEPHONE ENCOUNTER
Patient calls today to request a message be passed to Autumn , ok to come with provider at next appointment.

## 2024-02-01 ENCOUNTER — APPOINTMENT (OUTPATIENT)
Dept: RADIOLOGY | Facility: HOSPITAL | Age: 69
End: 2024-02-01
Payer: MEDICARE

## 2024-02-01 ENCOUNTER — APPOINTMENT (OUTPATIENT)
Dept: CARDIOLOGY | Facility: HOSPITAL | Age: 69
End: 2024-02-01
Payer: MEDICARE

## 2024-02-01 ENCOUNTER — HOSPITAL ENCOUNTER (EMERGENCY)
Facility: HOSPITAL | Age: 69
Discharge: OTHER INSTITUTION | End: 2024-02-02
Attending: FAMILY MEDICINE
Payer: MEDICARE

## 2024-02-01 DIAGNOSIS — N17.9 ACUTE RENAL FAILURE SUPERIMPOSED ON CHRONIC KIDNEY DISEASE, UNSPECIFIED ACUTE RENAL FAILURE TYPE, UNSPECIFIED CKD STAGE (CMS-HCC): ICD-10-CM

## 2024-02-01 DIAGNOSIS — J18.9 PNEUMONIA DUE TO INFECTIOUS ORGANISM, UNSPECIFIED LATERALITY, UNSPECIFIED PART OF LUNG: Primary | ICD-10-CM

## 2024-02-01 DIAGNOSIS — R26.81 UNSTEADY GAIT: ICD-10-CM

## 2024-02-01 DIAGNOSIS — E87.5 HYPERKALEMIA: ICD-10-CM

## 2024-02-01 DIAGNOSIS — R73.9 HYPERGLYCEMIA: ICD-10-CM

## 2024-02-01 DIAGNOSIS — N18.9 ACUTE RENAL FAILURE SUPERIMPOSED ON CHRONIC KIDNEY DISEASE, UNSPECIFIED ACUTE RENAL FAILURE TYPE, UNSPECIFIED CKD STAGE (CMS-HCC): ICD-10-CM

## 2024-02-01 DIAGNOSIS — J44.1 COPD EXACERBATION (MULTI): ICD-10-CM

## 2024-02-01 LAB
ALBUMIN SERPL BCP-MCNC: 2.6 G/DL (ref 3.4–5)
ALP SERPL-CCNC: 111 U/L (ref 33–136)
ALT SERPL W P-5'-P-CCNC: 12 U/L (ref 7–45)
ANION GAP SERPL CALC-SCNC: 11 MMOL/L (ref 10–20)
ANION GAP SERPL CALC-SCNC: 12 MMOL/L (ref 10–20)
APPEARANCE UR: CLEAR
AST SERPL W P-5'-P-CCNC: 13 U/L (ref 9–39)
ATRIAL RATE: 52 BPM
BACTERIA #/AREA URNS AUTO: ABNORMAL /HPF
BASE EXCESS BLDV CALC-SCNC: 0.4 MMOL/L (ref -2–3)
BASOPHILS # BLD AUTO: 0.05 X10*3/UL (ref 0–0.1)
BASOPHILS NFR BLD AUTO: 0.7 %
BILIRUB SERPL-MCNC: 0.2 MG/DL (ref 0–1.2)
BILIRUB UR STRIP.AUTO-MCNC: NEGATIVE MG/DL
BNP SERPL-MCNC: 1786 PG/ML (ref 0–99)
BODY TEMPERATURE: ABNORMAL
BUN SERPL-MCNC: 49 MG/DL (ref 6–23)
BUN SERPL-MCNC: 49 MG/DL (ref 6–23)
CALCIUM SERPL-MCNC: 7.3 MG/DL (ref 8.6–10.3)
CALCIUM SERPL-MCNC: 7.5 MG/DL (ref 8.6–10.3)
CARDIAC TROPONIN I PNL SERPL HS: 18 NG/L (ref 0–13)
CHLORIDE SERPL-SCNC: 105 MMOL/L (ref 98–107)
CHLORIDE SERPL-SCNC: 105 MMOL/L (ref 98–107)
CO2 SERPL-SCNC: 29 MMOL/L (ref 21–32)
CO2 SERPL-SCNC: 29 MMOL/L (ref 21–32)
COLOR UR: YELLOW
CREAT SERPL-MCNC: 3.15 MG/DL (ref 0.5–1.05)
CREAT SERPL-MCNC: 3.25 MG/DL (ref 0.5–1.05)
EGFRCR SERPLBLD CKD-EPI 2021: 15 ML/MIN/1.73M*2
EGFRCR SERPLBLD CKD-EPI 2021: 15 ML/MIN/1.73M*2
EOSINOPHIL # BLD AUTO: 0.13 X10*3/UL (ref 0–0.7)
EOSINOPHIL NFR BLD AUTO: 1.9 %
ERYTHROCYTE [DISTWIDTH] IN BLOOD BY AUTOMATED COUNT: 15.2 % (ref 11.5–14.5)
FLUAV RNA RESP QL NAA+PROBE: NOT DETECTED
FLUBV RNA RESP QL NAA+PROBE: NOT DETECTED
GLUCOSE SERPL-MCNC: 186 MG/DL (ref 74–99)
GLUCOSE SERPL-MCNC: 220 MG/DL (ref 74–99)
GLUCOSE UR STRIP.AUTO-MCNC: ABNORMAL MG/DL
HCO3 BLDV-SCNC: 29.6 MMOL/L (ref 22–26)
HCT VFR BLD AUTO: 32.8 % (ref 36–46)
HGB BLD-MCNC: 9.5 G/DL (ref 12–16)
HYALINE CASTS #/AREA URNS AUTO: ABNORMAL /LPF
IMM GRANULOCYTES # BLD AUTO: 0.04 X10*3/UL (ref 0–0.7)
IMM GRANULOCYTES NFR BLD AUTO: 0.6 % (ref 0–0.9)
INHALED O2 CONCENTRATION: 36 %
KETONES UR STRIP.AUTO-MCNC: NEGATIVE MG/DL
LACTATE SERPL-SCNC: 0.5 MMOL/L (ref 0.4–2)
LEUKOCYTE ESTERASE UR QL STRIP.AUTO: NEGATIVE
LYMPHOCYTES # BLD AUTO: 1.48 X10*3/UL (ref 1.2–4.8)
LYMPHOCYTES NFR BLD AUTO: 22.1 %
MAGNESIUM SERPL-MCNC: 2.15 MG/DL (ref 1.6–2.4)
MCH RBC QN AUTO: 30.3 PG (ref 26–34)
MCHC RBC AUTO-ENTMCNC: 29 G/DL (ref 32–36)
MCV RBC AUTO: 105 FL (ref 80–100)
MONOCYTES # BLD AUTO: 0.65 X10*3/UL (ref 0.1–1)
MONOCYTES NFR BLD AUTO: 9.7 %
MUCOUS THREADS #/AREA URNS AUTO: ABNORMAL /LPF
NEUTROPHILS # BLD AUTO: 4.35 X10*3/UL (ref 1.2–7.7)
NEUTROPHILS NFR BLD AUTO: 65 %
NITRITE UR QL STRIP.AUTO: NEGATIVE
NRBC BLD-RTO: 0 /100 WBCS (ref 0–0)
OXYHGB MFR BLDV: 83.1 % (ref 45–75)
P AXIS: 87 DEGREES
P OFFSET: 144 MS
P ONSET: 122 MS
PCO2 BLDV: 69 MM HG (ref 41–51)
PH BLDV: 7.24 PH (ref 7.33–7.43)
PH UR STRIP.AUTO: 5 [PH]
PLATELET # BLD AUTO: 295 X10*3/UL (ref 150–450)
PO2 BLDV: 57 MM HG (ref 35–45)
POTASSIUM SERPL-SCNC: 5.4 MMOL/L (ref 3.5–5.3)
POTASSIUM SERPL-SCNC: 5.4 MMOL/L (ref 3.5–5.3)
PR INTERVAL: 188 MS
PROT SERPL-MCNC: 6.1 G/DL (ref 6.4–8.2)
PROT UR STRIP.AUTO-MCNC: ABNORMAL MG/DL
Q ONSET: 216 MS
QRS COUNT: 9 BEATS
QRS DURATION: 120 MS
QT INTERVAL: 504 MS
QTC CALCULATION(BAZETT): 468 MS
QTC FREDERICIA: 480 MS
R AXIS: -47 DEGREES
RBC # BLD AUTO: 3.14 X10*6/UL (ref 4–5.2)
RBC # UR STRIP.AUTO: NEGATIVE /UL
RBC #/AREA URNS AUTO: ABNORMAL /HPF
SAO2 % BLDV: 88 % (ref 45–75)
SARS-COV-2 RNA RESP QL NAA+PROBE: NOT DETECTED
SODIUM SERPL-SCNC: 140 MMOL/L (ref 136–145)
SODIUM SERPL-SCNC: 141 MMOL/L (ref 136–145)
SP GR UR STRIP.AUTO: 1.01
T AXIS: 93 DEGREES
T OFFSET: 468 MS
UROBILINOGEN UR STRIP.AUTO-MCNC: <2 MG/DL
VENTRICULAR RATE: 52 BPM
WBC # BLD AUTO: 6.7 X10*3/UL (ref 4.4–11.3)
WBC #/AREA URNS AUTO: ABNORMAL /HPF

## 2024-02-01 PROCEDURE — 82374 ASSAY BLOOD CARBON DIOXIDE: CPT | Performed by: EMERGENCY MEDICINE

## 2024-02-01 PROCEDURE — 81001 URINALYSIS AUTO W/SCOPE: CPT | Performed by: FAMILY MEDICINE

## 2024-02-01 PROCEDURE — 93005 ELECTROCARDIOGRAM TRACING: CPT

## 2024-02-01 PROCEDURE — 82043 UR ALBUMIN QUANTITATIVE: CPT | Mod: GENLAB | Performed by: INTERNAL MEDICINE

## 2024-02-01 PROCEDURE — 2500000004 HC RX 250 GENERAL PHARMACY W/ HCPCS (ALT 636 FOR OP/ED): Performed by: EMERGENCY MEDICINE

## 2024-02-01 PROCEDURE — 94640 AIRWAY INHALATION TREATMENT: CPT

## 2024-02-01 PROCEDURE — 51798 US URINE CAPACITY MEASURE: CPT

## 2024-02-01 PROCEDURE — 83605 ASSAY OF LACTIC ACID: CPT | Performed by: FAMILY MEDICINE

## 2024-02-01 PROCEDURE — 9420000001 HC RT PATIENT EDUCATION 5 MIN

## 2024-02-01 PROCEDURE — 87636 SARSCOV2 & INF A&B AMP PRB: CPT | Performed by: FAMILY MEDICINE

## 2024-02-01 PROCEDURE — 71045 X-RAY EXAM CHEST 1 VIEW: CPT | Performed by: RADIOLOGY

## 2024-02-01 PROCEDURE — 96365 THER/PROPH/DIAG IV INF INIT: CPT

## 2024-02-01 PROCEDURE — 83880 ASSAY OF NATRIURETIC PEPTIDE: CPT | Performed by: EMERGENCY MEDICINE

## 2024-02-01 PROCEDURE — 80053 COMPREHEN METABOLIC PANEL: CPT | Performed by: FAMILY MEDICINE

## 2024-02-01 PROCEDURE — 2500000002 HC RX 250 W HCPCS SELF ADMINISTERED DRUGS (ALT 637 FOR MEDICARE OP, ALT 636 FOR OP/ED)

## 2024-02-01 PROCEDURE — 36415 COLL VENOUS BLD VENIPUNCTURE: CPT | Performed by: FAMILY MEDICINE

## 2024-02-01 PROCEDURE — 2500000004 HC RX 250 GENERAL PHARMACY W/ HCPCS (ALT 636 FOR OP/ED)

## 2024-02-01 PROCEDURE — 99285 EMERGENCY DEPT VISIT HI MDM: CPT | Performed by: FAMILY MEDICINE

## 2024-02-01 PROCEDURE — 71250 CT THORAX DX C-: CPT

## 2024-02-01 PROCEDURE — 85025 COMPLETE CBC W/AUTO DIFF WBC: CPT | Performed by: FAMILY MEDICINE

## 2024-02-01 PROCEDURE — 82805 BLOOD GASES W/O2 SATURATION: CPT | Performed by: FAMILY MEDICINE

## 2024-02-01 PROCEDURE — 82436 ASSAY OF URINE CHLORIDE: CPT | Mod: GENLAB | Performed by: INTERNAL MEDICINE

## 2024-02-01 PROCEDURE — 36415 COLL VENOUS BLD VENIPUNCTURE: CPT | Performed by: EMERGENCY MEDICINE

## 2024-02-01 PROCEDURE — 71250 CT THORAX DX C-: CPT | Performed by: RADIOLOGY

## 2024-02-01 PROCEDURE — 83735 ASSAY OF MAGNESIUM: CPT | Performed by: EMERGENCY MEDICINE

## 2024-02-01 PROCEDURE — 84484 ASSAY OF TROPONIN QUANT: CPT | Performed by: FAMILY MEDICINE

## 2024-02-01 PROCEDURE — 2500000005 HC RX 250 GENERAL PHARMACY W/O HCPCS: Performed by: FAMILY MEDICINE

## 2024-02-01 PROCEDURE — 96361 HYDRATE IV INFUSION ADD-ON: CPT

## 2024-02-01 PROCEDURE — 94664 DEMO&/EVAL PT USE INHALER: CPT

## 2024-02-01 PROCEDURE — 96366 THER/PROPH/DIAG IV INF ADDON: CPT

## 2024-02-01 PROCEDURE — 71045 X-RAY EXAM CHEST 1 VIEW: CPT

## 2024-02-01 PROCEDURE — 96375 TX/PRO/DX INJ NEW DRUG ADDON: CPT

## 2024-02-01 PROCEDURE — 2500000004 HC RX 250 GENERAL PHARMACY W/ HCPCS (ALT 636 FOR OP/ED): Performed by: FAMILY MEDICINE

## 2024-02-01 RX ORDER — FUROSEMIDE 10 MG/ML
80 INJECTION INTRAMUSCULAR; INTRAVENOUS ONCE
Status: COMPLETED | OUTPATIENT
Start: 2024-02-01 | End: 2024-02-01

## 2024-02-01 RX ORDER — HYDRALAZINE HYDROCHLORIDE 20 MG/ML
10 INJECTION INTRAMUSCULAR; INTRAVENOUS ONCE
Status: COMPLETED | OUTPATIENT
Start: 2024-02-01 | End: 2024-02-01

## 2024-02-01 RX ORDER — LEVOFLOXACIN 5 MG/ML
750 INJECTION, SOLUTION INTRAVENOUS ONCE
Status: COMPLETED | OUTPATIENT
Start: 2024-02-01 | End: 2024-02-01

## 2024-02-01 RX ORDER — IPRATROPIUM BROMIDE AND ALBUTEROL SULFATE 2.5; .5 MG/3ML; MG/3ML
3 SOLUTION RESPIRATORY (INHALATION)
Status: DISCONTINUED | OUTPATIENT
Start: 2024-02-01 | End: 2024-02-01

## 2024-02-01 RX ORDER — LEVOFLOXACIN 5 MG/ML
INJECTION, SOLUTION INTRAVENOUS
Status: COMPLETED
Start: 2024-02-01 | End: 2024-02-01

## 2024-02-01 RX ORDER — IPRATROPIUM BROMIDE AND ALBUTEROL SULFATE 2.5; .5 MG/3ML; MG/3ML
6 SOLUTION RESPIRATORY (INHALATION) ONCE
Status: COMPLETED | OUTPATIENT
Start: 2024-02-01 | End: 2024-02-01

## 2024-02-01 RX ORDER — BUMETANIDE 0.25 MG/ML
1 INJECTION INTRAMUSCULAR; INTRAVENOUS ONCE
Status: COMPLETED | OUTPATIENT
Start: 2024-02-01 | End: 2024-02-01

## 2024-02-01 RX ORDER — IPRATROPIUM BROMIDE AND ALBUTEROL SULFATE 2.5; .5 MG/3ML; MG/3ML
SOLUTION RESPIRATORY (INHALATION)
Status: COMPLETED
Start: 2024-02-01 | End: 2024-02-01

## 2024-02-01 RX ADMIN — LEVOFLOXACIN 750 MG: 5 INJECTION, SOLUTION INTRAVENOUS at 05:35

## 2024-02-01 RX ADMIN — FUROSEMIDE 80 MG: 10 INJECTION, SOLUTION INTRAMUSCULAR; INTRAVENOUS at 19:04

## 2024-02-01 RX ADMIN — SODIUM CHLORIDE 500 ML: 9 INJECTION, SOLUTION INTRAVENOUS at 07:53

## 2024-02-01 RX ADMIN — BUMETANIDE 1 MG: 0.25 INJECTION INTRAMUSCULAR; INTRAVENOUS at 15:35

## 2024-02-01 RX ADMIN — IPRATROPIUM BROMIDE AND ALBUTEROL SULFATE 6 ML: 2.5; .5 SOLUTION RESPIRATORY (INHALATION) at 05:34

## 2024-02-01 RX ADMIN — IPRATROPIUM BROMIDE AND ALBUTEROL SULFATE 6 ML: .5; 3 SOLUTION RESPIRATORY (INHALATION) at 05:34

## 2024-02-01 RX ADMIN — Medication 4 L/MIN: at 05:34

## 2024-02-01 RX ADMIN — HYDRALAZINE HYDROCHLORIDE 10 MG: 20 INJECTION INTRAMUSCULAR; INTRAVENOUS at 19:04

## 2024-02-01 RX ADMIN — LEVOFLOXACIN 750 MG: 750 INJECTION, SOLUTION INTRAVENOUS at 05:35

## 2024-02-01 RX ADMIN — SODIUM CHLORIDE 500 ML: 9 INJECTION, SOLUTION INTRAVENOUS at 15:35

## 2024-02-01 ASSESSMENT — COLUMBIA-SUICIDE SEVERITY RATING SCALE - C-SSRS
6. HAVE YOU EVER DONE ANYTHING, STARTED TO DO ANYTHING, OR PREPARED TO DO ANYTHING TO END YOUR LIFE?: NO
1. IN THE PAST MONTH, HAVE YOU WISHED YOU WERE DEAD OR WISHED YOU COULD GO TO SLEEP AND NOT WAKE UP?: NO
2. HAVE YOU ACTUALLY HAD ANY THOUGHTS OF KILLING YOURSELF?: NO

## 2024-02-01 ASSESSMENT — PAIN SCALES - GENERAL: PAINLEVEL_OUTOF10: 0 - NO PAIN

## 2024-02-01 ASSESSMENT — PAIN - FUNCTIONAL ASSESSMENT: PAIN_FUNCTIONAL_ASSESSMENT: 0-10

## 2024-02-01 NOTE — ED PROVIDER NOTES
HPI   Chief Complaint   Patient presents with    Weakness, Gen     Patient was unable to get out of her chair tonight and called 911 for assistance. They have been called multiple times this week so they brought her in for evaluation for not being able to care for herself.        68-year-old female with history of CHF, A-fib, COPD, CVA, depression, diabetes, hypertension, hyperlipidemia, and chronically on 3 L nasal cannula comes to the ED via EMS after she called them due to concern of persistent shortness of breath and difficulty ambulating around her home with a walker.  Patient reports he been dealing with difficulty moving around her house with a walker for some time.  However patient reports that the shortness of breath began the last couple days and despite her home medications and oxygen and has not been improving.  Patient in the ED is alert, cooperative, appears anxious, comfortable, and in no distress.  Patient currently denies headache, neck pain, chest pain, back pain, abdominal pain, fevers, falls, recent travel, sick contact, nausea/vomiting/diarrhea, numbness/tingling, loss sensation, dysuria, dizziness, and weakness.      History provided by:  EMS personnel, medical records and patient   used: No                        [place] Coma Scale Score: 15                  Patient History   Past Medical History:   Diagnosis Date    Acute CHF (CMS/[facility])     04/2023    Arthritis     Atrial fibrillation (CMS/[facility])     Chronic diastolic CHF (congestive heart failure) (CMS/[facility])     COPD (chronic obstructive pulmonary disease) (CMS/[facility])     CVA (cerebral vascular accident) (CMS/[facility])     2021- righ sided weakness    Depression     Diabetes (CMS/[facility])     Essential tremor     HTN (hypertension)     Hyperlipidemia     Hypothyroidism     s/p radioactive iodine treatment    Impacted cerumen, left ear     Impacted cerumen of left ear    Left ventricular ejection fraction greater than 40%     40-45%     Noncompliance     Personal history of other diseases of the respiratory system     History of acute bronchitis    Renal carcinoma, right (CMS/[facility])     s/p partial nephrectomy 2021    Vitamin D deficiency      Past Surgical History:   Procedure Laterality Date    ANKLE SURGERY      2013    CATARACT EXTRACTION Right     2019     SECTION, CLASSIC      MR CHEST ANGIO W AND WO IV CONTRAST  2023    MR CHEST ANGIO W AND WO IV CONTRAST 2023 [facility]    MR HEAD ANGIO WO IV CONTRAST  2021    MR HEAD ANGIO WO IV CONTRAST [facility]    NEPHRECTOMY  2021    SHOULDER SURGERY      2009     Family History   Problem Relation Name Age of Onset    Hypertension Mother      Diabetes Father      Heart disease Father      Cancer Sister      Cancer Brother      Diabetes Daughter      Asthma Daughter      Heart attack Daughter      Diabetes Maternal Grandfather      Heart disease Paternal Grandmother       Social History     Tobacco Use    Smoking status: Every Day     Packs/day: .5     Types: Cigarettes     Start date: 1969     Passive exposure: Never    Smokeless tobacco: Never   Substance Use Topics    Alcohol use: Not Currently    Drug use: Yes     Types: Marijuana       Physical Exam   ED Triage Vitals   Temperature Heart Rate Respirations BP   24 0232 24 0232 24 0232 24 0232   36.2 °C (97.1 °F) 52 18 (!) 152/100      Pulse Ox Temp Source Heart Rate Source Patient Position   24 0232 24 0232 24 0232 24 0232   97 % Temporal Monitor Sitting      BP Location FiO2 (%)     24 0232 24 0534     Right arm 36 %       Physical Exam  Vitals and nursing note reviewed.   Constitutional:       General: She is not in acute distress.     Appearance: She is well-developed.   HENT:      Head: Normocephalic and atraumatic.   Eyes:      Conjunctiva/sclera: Conjunctivae normal.   Cardiovascular:      Rate and Rhythm: Normal rate and regular  rhythm.      Pulses: Normal pulses.      Heart sounds: Normal heart sounds, S1 normal and S2 normal. No murmur heard.  Pulmonary:      Effort: Pulmonary effort is normal. No tachypnea or respiratory distress.      Breath sounds: Wheezing present.   Abdominal:      Palpations: Abdomen is soft.      Tenderness: There is no abdominal tenderness.   Musculoskeletal:         General: No swelling.      Cervical back: Neck supple.   Skin:     General: Skin is warm and dry.      Capillary Refill: Capillary refill takes less than 2 seconds.   Neurological:      General: No focal deficit present.      Mental Status: She is alert and oriented to person, place, and time.      GCS: GCS eye subscore is 4. GCS verbal subscore is 5. GCS motor subscore is 6.   Psychiatric:         Mood and Affect: Mood normal.         ED Course & MDM   Diagnoses as of 02/01/24 0548   Pneumonia due to infectious organism, unspecified laterality, unspecified part of lung   COPD exacerbation (CMS/[facility])   Unsteady gait   Acute renal failure superimposed on chronic kidney disease, unspecified acute renal failure type, unspecified CKD stage (CMS/[facility])   Hyperkalemia   Hyperglycemia     Labs Reviewed   URINALYSIS WITH REFLEX MICROSCOPIC - Abnormal       Result Value    Color, Urine Yellow      Appearance, Urine Clear      Specific Gravity, Urine 1.013      pH, Urine 5.0      Protein, Urine >=500 (3+) (*)     Glucose, Urine >=500 (3+) (*)     Blood, Urine NEGATIVE      Ketones, Urine NEGATIVE      Bilirubin, Urine NEGATIVE      Urobilinogen, Urine <2.0      Nitrite, Urine NEGATIVE      Leukocyte Esterase, Urine NEGATIVE     CBC WITH AUTO DIFFERENTIAL - Abnormal    WBC 6.7      nRBC 0.0      RBC 3.14 (*)     Hemoglobin 9.5 (*)     Hematocrit 32.8 (*)      (*)     MCH 30.3      MCHC 29.0 (*)     RDW 15.2 (*)     Platelets 295      Neutrophils % 65.0      Immature Granulocytes %, Automated 0.6      Lymphocytes % 22.1      Monocytes % 9.7      Eosinophils %  1.9      Basophils % 0.7      Neutrophils Absolute 4.35      Immature Granulocytes Absolute, Automated 0.04      Lymphocytes Absolute 1.48      Monocytes Absolute 0.65      Eosinophils Absolute 0.13      Basophils Absolute 0.05     COMPREHENSIVE METABOLIC PANEL - Abnormal    Glucose 220 (*)     Sodium 140      Potassium 5.4 (*)     Chloride 105      Bicarbonate 29      Anion Gap 11      Urea Nitrogen 49 (*)     Creatinine 3.25 (*)     eGFR 15 (*)     Calcium 7.5 (*)     Albumin 2.6 (*)     Alkaline Phosphatase 111      Total Protein 6.1 (*)     AST 13      Bilirubin, Total 0.2      ALT 12     BLOOD GAS VENOUS - Abnormal    POCT pH, Venous 7.24 (*)     POCT pCO2, Venous 69 (*)     POCT pO2, Venous 57 (*)     POCT SO2, Venous 88 (*)     POCT Oxy Hemoglobin, Venous 83.1 (*)     POCT Base Excess, Venous 0.4      POCT HCO3 Calculated, Venous 29.6 (*)     Patient Temperature        FiO2 36     MICROSCOPIC ONLY, URINE - Abnormal    WBC, Urine 1-5      RBC, Urine 3-5      Bacteria, Urine 1+ (*)     Mucus, Urine FEW      Hyaline Casts, Urine OCCASIONAL (*)    SARS-COV-2 PCR, SYMPTOMATIC - Normal    Coronavirus 2019, PCR Not Detected      Narrative:     This assay has received FDA Emergency Use Authorization (EUA) and is only authorized for the duration of time that circumstances exist to justify the authorization of the emergency use of in vitro diagnostic tests for the detection of SARS-CoV-2 virus and/or diagnosis of COVID-19 infection under section 564(b)(1) of the Act, 21 U.S.C. 360bbb-3(b)(1). This assay is an in vitro diagnostic nucleic acid amplification test for the qualitative detection of SARS-CoV-2 from nasopharyngeal specimens and has been validated for use at [facility]. Negative results do not preclude COVID-19 infections and should not be used as the sole basis for diagnosis, treatment, or other management decisions.     INFLUENZA A AND B PCR - Normal    Flu A Result Not Detected       Flu B Result Not Detected      Narrative:     This assay is an in vitro diagnostic multiplex nucleic acid amplification test for the detection and discrimination of Influenza A & B from nasopharyngeal specimens, and has been validated for use at [facility]. Negative results do not preclude Influenza A/B infections, and should not be used as the sole basis for diagnosis, treatment, or other management decisions. If Influenza A/B and RSV PCR results are negative, testing for Parainfluenza virus, Adenovirus and Metapneumovirus is routinely performed for [facility] pediatric oncology and intensive care inpatients, and is available on other patients by placing an add-on request.   LACTATE - Normal    Lactate 0.5      Narrative:     Venipuncture immediately after or during the administration of Metamizole may lead to falsely low results. Testing should be performed immediately  prior to Metamizole dosing.   TROPONIN I, HIGH SENSITIVITY     XR chest 1 view   Final Result   New large right pleural effusion and basilar atelectasis. Underlying   pneumonia is not excluded in the appropriate clinical setting.        MACRO:   None        Signed by: Lesley Dooley 2/1/2024 3:19 AM   Dictation workstation:   [id number]        0246 -- NSR rate 52.  Left axis deviation.  Normal intervals.  Nonspecific ST-T changes.  No findings of STEMI. Unchanged compared to old EKG    Medical Decision Making  Patient upon arrival to the ED appeared to be anxious but comfortable and in no distress with stable vital signs and mild hypertension.  Discussed with patient presenting complaints and clinical findings.  Reviewed with patient and epic chart and counseled patient on shortness of breath and appropriate approach to management/treatments.  After assessment and evaluation IV line was started, labs sent, imaging ordered, EKG reviewed, started on aerosol treatments, given IV Solu-Medrol, placed on cardiac monitor, and observed.   After treatment and a period of rest patient was reassessed finally feeling little more comfortable, felt less short of breath, continue on her supplemental oxygen, patient had no new physical complaints, vital signs unchanged, and results were reviewed/discussed with patient.  At this time patient started on small IV bolus hydration, started on IV antibiotics, given further treatments, and call was placed to transfer center.  Case discussed with on-call internal medicine at [facility] after discussion was agreed patient require admission/transfer to the facility for continued care and treatment of the findings on workup.  Patient stable and transferred.    Amount and/or Complexity of Data Reviewed  Independent Historian: EMS  External Data Reviewed: labs, radiology, ECG and notes.  Labs: ordered. Decision-making details documented in ED Course.  Radiology: ordered. Decision-making details documented in ED Course.  ECG/medicine tests: ordered and independent interpretation performed. Decision-making details documented in ED Course.    Risk  Decision regarding hospitalization.        Procedure  Procedures     Lam Kiser MD  02/01/24 0549

## 2024-02-01 NOTE — PROGRESS NOTES
Emergency Medicine Transition of Care Note.    I received Daphne Morris in signout from Dr. Caraballo.  Please see the previous ED provider note for all HPI, PE and MDM up to the time of signout at 0700. This is in addition to the primary record.    In brief Daphne Morris is an 68 y.o. female presenting for   Chief Complaint   Patient presents with    Weakness, Gen     Patient was unable to get out of her chair tonight and called 911 for assistance. They have been called multiple times this week so they brought her in for evaluation for not being able to care for herself.      At the time of signout we were awaiting: bed and transport    Diagnoses as of 02/01/24 1829   Pneumonia due to infectious organism, unspecified laterality, unspecified part of lung   COPD exacerbation (CMS/[facility])   Unsteady gait   Acute renal failure superimposed on chronic kidney disease, unspecified acute renal failure type, unspecified CKD stage (CMS/[facility])   Hyperkalemia   Hyperglycemia       Medical Decision Making  Talk to both cardiology Dr. Peterson and also renal.  Dr. Peterson on agrees to starting the patient to [facility] for further evaluation treatment.  Renal physician recommended giving 80 mg of Lasix.  No further intervention at this time      Patient still not diuresing patient's pleural effusions plan was to give additional diuretic to help diurese the patient.  Her potassium slightly elevated for this reason did give her a small amount of fluids which I know is counterproductive however I was hoping with the stronger Bumex and the fluids would help diurese her and lower the K.  She has not increased her output from the urine standpoint.  At that point reached out to the nurse to do a bladder scan to see if the patient is having any urinary retention.  In addition to that patient blood pressure has also creased for this reason order hydralazine.      Final diagnoses:   [J18.9] Pneumonia due to infectious organism, unspecified  laterality, unspecified part of lung   [J44.1] COPD exacerbation (CMS/[facility])   [R26.81] Unsteady gait   [N17.9, N18.9] Acute renal failure superimposed on chronic kidney disease, unspecified acute renal failure type, unspecified CKD stage (CMS/[facility])   [E87.5] Hyperkalemia   [R73.9] Hyperglycemia           Procedure  Procedures    Milton Reyna, DO

## 2024-02-02 ENCOUNTER — APPOINTMENT (OUTPATIENT)
Dept: RADIOLOGY | Facility: HOSPITAL | Age: 69
DRG: 286 | End: 2024-02-02
Payer: MEDICARE

## 2024-02-02 ENCOUNTER — APPOINTMENT (OUTPATIENT)
Dept: PRIMARY CARE | Facility: CLINIC | Age: 69
End: 2024-02-02
Payer: MEDICARE

## 2024-02-02 ENCOUNTER — HOSPITAL ENCOUNTER (INPATIENT)
Facility: HOSPITAL | Age: 69
LOS: 13 days | Discharge: NURSING, REHAB OR LONG-TERM CARE | DRG: 286 | End: 2024-02-15
Attending: INTERNAL MEDICINE | Admitting: INTERNAL MEDICINE
Payer: MEDICARE

## 2024-02-02 VITALS
SYSTOLIC BLOOD PRESSURE: 144 MMHG | BODY MASS INDEX: 31.66 KG/M2 | RESPIRATION RATE: 18 BRPM | HEART RATE: 56 BPM | WEIGHT: 197 LBS | OXYGEN SATURATION: 98 % | TEMPERATURE: 96.2 F | DIASTOLIC BLOOD PRESSURE: 74 MMHG | HEIGHT: 66 IN

## 2024-02-02 DIAGNOSIS — I50.43 CHF (CONGESTIVE HEART FAILURE), NYHA CLASS I, ACUTE ON CHRONIC, COMBINED (MULTI): Primary | ICD-10-CM

## 2024-02-02 DIAGNOSIS — I50.31 ACUTE DIASTOLIC (CONGESTIVE) HEART FAILURE (MULTI): ICD-10-CM

## 2024-02-02 DIAGNOSIS — I50.41 ACUTE COMBINED SYSTOLIC (CONGESTIVE) AND DIASTOLIC (CONGESTIVE) HEART FAILURE (MULTI): ICD-10-CM

## 2024-02-02 LAB
ALBUMIN SERPL BCP-MCNC: 2.4 G/DL (ref 3.4–5)
ANION GAP BLDV CALCULATED.4IONS-SCNC: 6 MMOL/L (ref 10–25)
ANION GAP SERPL CALC-SCNC: 10 MMOL/L (ref 10–20)
ARTERIAL PATENCY WRIST A: POSITIVE
BASE EXCESS BLDA CALC-SCNC: 1.8 MMOL/L (ref -2–3)
BASE EXCESS BLDV CALC-SCNC: -0.3 MMOL/L (ref -2–3)
BASE EXCESS BLDV CALC-SCNC: 1.8 MMOL/L (ref -2–3)
BASE EXCESS BLDV CALC-SCNC: 3.3 MMOL/L (ref -2–3)
BASE EXCESS BLDV CALC-SCNC: 3.5 MMOL/L (ref -2–3)
BODY TEMPERATURE: ABNORMAL
BUN SERPL-MCNC: 47 MG/DL (ref 6–23)
CA-I BLDV-SCNC: 1.06 MMOL/L (ref 1.1–1.33)
CALCIUM SERPL-MCNC: 7.5 MG/DL (ref 8.6–10.3)
CHLORIDE BLDV-SCNC: 108 MMOL/L (ref 98–107)
CHLORIDE SERPL-SCNC: 106 MMOL/L (ref 98–107)
CHLORIDE UR-SCNC: 67 MMOL/L
CHLORIDE/CREATININE (MMOL/G) IN URINE: 125 MMOL/G CREAT (ref 38–318)
CLARITY FLD: CLEAR
CO2 SERPL-SCNC: 31 MMOL/L (ref 21–32)
COLOR FLD: NORMAL
CREAT SERPL-MCNC: 3.21 MG/DL (ref 0.5–1.05)
CREAT UR-MCNC: 53.6 MG/DL (ref 20–320)
CREAT UR-MCNC: 53.6 MG/DL (ref 20–320)
EGFRCR SERPLBLD CKD-EPI 2021: 15 ML/MIN/1.73M*2
ERYTHROCYTE [DISTWIDTH] IN BLOOD BY AUTOMATED COUNT: 14.9 % (ref 11.5–14.5)
GLUCOSE BLD MANUAL STRIP-MCNC: 117 MG/DL (ref 74–99)
GLUCOSE BLD MANUAL STRIP-MCNC: 155 MG/DL (ref 74–99)
GLUCOSE BLD MANUAL STRIP-MCNC: 159 MG/DL (ref 74–99)
GLUCOSE BLDV-MCNC: 185 MG/DL (ref 74–99)
GLUCOSE SERPL-MCNC: 183 MG/DL (ref 74–99)
HCO3 BLDA-SCNC: 31.1 MMOL/L (ref 22–26)
HCO3 BLDV-SCNC: 28.9 MMOL/L (ref 22–26)
HCO3 BLDV-SCNC: 31.9 MMOL/L (ref 22–26)
HCO3 BLDV-SCNC: 32.4 MMOL/L (ref 22–26)
HCO3 BLDV-SCNC: 32.9 MMOL/L (ref 22–26)
HCT VFR BLD AUTO: 32.6 % (ref 36–46)
HCT VFR BLD EST: 31 % (ref 36–46)
HGB BLD-MCNC: 9.4 G/DL (ref 12–16)
HGB BLDV-MCNC: 10.2 G/DL (ref 12–16)
HOLD SPECIMEN: NORMAL
INHALED O2 CONCENTRATION: 28 %
INHALED O2 CONCENTRATION: 28 %
INHALED O2 CONCENTRATION: 40 %
LACTATE BLDV-SCNC: 0.5 MMOL/L (ref 0.4–2)
LDH SERPL L TO P-CCNC: 277 U/L (ref 84–246)
LYMPHOCYTES NFR FLD MANUAL: 78 %
MAGNESIUM SERPL-MCNC: 2.01 MG/DL (ref 1.6–2.4)
MCH RBC QN AUTO: 30.1 PG (ref 26–34)
MCHC RBC AUTO-ENTMCNC: 28.8 G/DL (ref 32–36)
MCV RBC AUTO: 105 FL (ref 80–100)
MICROALBUMIN UR-MCNC: >2250 MG/L
MICROALBUMIN/CREAT UR: NORMAL MG/G{CREAT}
MONOS+MACROS NFR FLD MANUAL: 3 %
NEUTROPHILS NFR FLD MANUAL: 19 %
NRBC BLD-RTO: 0 /100 WBCS (ref 0–0)
OXYHGB MFR BLDA: 96.4 % (ref 94–98)
OXYHGB MFR BLDV: 22.9 % (ref 45–75)
OXYHGB MFR BLDV: 48.1 % (ref 45–75)
OXYHGB MFR BLDV: 55.7 % (ref 45–75)
OXYHGB MFR BLDV: 73.4 % (ref 45–75)
PCO2 BLDA: 71 MM HG (ref 38–42)
PCO2 BLDV: 69 MM HG (ref 41–51)
PCO2 BLDV: 74 MM HG (ref 41–51)
PCO2 BLDV: 75 MM HG (ref 41–51)
PCO2 BLDV: 78 MM HG (ref 41–51)
PH BLDA: 7.25 PH (ref 7.38–7.42)
PH BLDV: 7.2 PH (ref 7.33–7.43)
PH BLDV: 7.22 PH (ref 7.33–7.43)
PH BLDV: 7.25 PH (ref 7.33–7.43)
PH BLDV: 7.28 PH (ref 7.33–7.43)
PHOSPHATE SERPL-MCNC: 6 MG/DL (ref 2.5–4.9)
PLATELET # BLD AUTO: 278 X10*3/UL (ref 150–450)
PO2 BLDA: 86 MM HG (ref 85–95)
PO2 BLDV: 19 MM HG (ref 35–45)
PO2 BLDV: 29 MM HG (ref 35–45)
PO2 BLDV: 34 MM HG (ref 35–45)
PO2 BLDV: 44 MM HG (ref 35–45)
POTASSIUM BLDV-SCNC: 5 MMOL/L (ref 3.5–5.3)
POTASSIUM SERPL-SCNC: 4.9 MMOL/L (ref 3.5–5.3)
POTASSIUM UR-SCNC: 64 MMOL/L
POTASSIUM/CREAT UR-RTO: 119 MMOL/G CREAT
PROT SERPL-MCNC: 5.7 G/DL (ref 6.4–8.2)
RBC # BLD AUTO: 3.12 X10*6/UL (ref 4–5.2)
RBC # FLD AUTO: 55 /UL
SAO2 % BLDA: 98 % (ref 94–100)
SAO2 % BLDV: 23 % (ref 45–75)
SAO2 % BLDV: 49 % (ref 45–75)
SAO2 % BLDV: 56 % (ref 45–75)
SAO2 % BLDV: 76 % (ref 45–75)
SODIUM BLDV-SCNC: 138 MMOL/L (ref 136–145)
SODIUM SERPL-SCNC: 142 MMOL/L (ref 136–145)
SODIUM UR-SCNC: 40 MMOL/L
SODIUM/CREAT UR-RTO: 75 MMOL/G CREAT
SPECIMEN DRAWN FROM PATIENT: ABNORMAL
TOTAL CELLS COUNTED FLD: 100
WBC # BLD AUTO: 5.9 X10*3/UL (ref 4.4–11.3)
WBC # FLD AUTO: 204 /UL

## 2024-02-02 PROCEDURE — 94660 CPAP INITIATION&MGMT: CPT | Mod: MUE

## 2024-02-02 PROCEDURE — 71045 X-RAY EXAM CHEST 1 VIEW: CPT

## 2024-02-02 PROCEDURE — 83615 LACTATE (LD) (LDH) ENZYME: CPT

## 2024-02-02 PROCEDURE — 87102 FUNGUS ISOLATION CULTURE: CPT | Mod: GEALAB

## 2024-02-02 PROCEDURE — 73564 X-RAY EXAM KNEE 4 OR MORE: CPT | Mod: LEFT SIDE | Performed by: RADIOLOGY

## 2024-02-02 PROCEDURE — 88342 IMHCHEM/IMCYTCHM 1ST ANTB: CPT | Performed by: PATHOLOGY

## 2024-02-02 PROCEDURE — 0W993ZZ DRAINAGE OF RIGHT PLEURAL CAVITY, PERCUTANEOUS APPROACH: ICD-10-PCS

## 2024-02-02 PROCEDURE — 36415 COLL VENOUS BLD VENIPUNCTURE: CPT

## 2024-02-02 PROCEDURE — 99223 1ST HOSP IP/OBS HIGH 75: CPT | Performed by: INTERNAL MEDICINE

## 2024-02-02 PROCEDURE — 88305 TISSUE EXAM BY PATHOLOGIST: CPT | Performed by: PATHOLOGY

## 2024-02-02 PROCEDURE — 2500000001 HC RX 250 WO HCPCS SELF ADMINISTERED DRUGS (ALT 637 FOR MEDICARE OP)

## 2024-02-02 PROCEDURE — 76770 US EXAM ABDO BACK WALL COMP: CPT

## 2024-02-02 PROCEDURE — 2060000001 HC INTERMEDIATE ICU ROOM DAILY

## 2024-02-02 PROCEDURE — 84132 ASSAY OF SERUM POTASSIUM: CPT

## 2024-02-02 PROCEDURE — 87449 NOS EACH ORGANISM AG IA: CPT | Mod: GEALAB

## 2024-02-02 PROCEDURE — 94640 AIRWAY INHALATION TREATMENT: CPT | Mod: MUE

## 2024-02-02 PROCEDURE — 83735 ASSAY OF MAGNESIUM: CPT

## 2024-02-02 PROCEDURE — 82947 ASSAY GLUCOSE BLOOD QUANT: CPT

## 2024-02-02 PROCEDURE — 51701 INSERT BLADDER CATHETER: CPT

## 2024-02-02 PROCEDURE — 89050 BODY FLUID CELL COUNT: CPT

## 2024-02-02 PROCEDURE — 94664 DEMO&/EVAL PT USE INHALER: CPT

## 2024-02-02 PROCEDURE — 2500000004 HC RX 250 GENERAL PHARMACY W/ HCPCS (ALT 636 FOR OP/ED): Mod: MUE

## 2024-02-02 PROCEDURE — 84155 ASSAY OF PROTEIN SERUM: CPT

## 2024-02-02 PROCEDURE — 88341 IMHCHEM/IMCYTCHM EA ADD ANTB: CPT | Performed by: PATHOLOGY

## 2024-02-02 PROCEDURE — 36600 WITHDRAWAL OF ARTERIAL BLOOD: CPT

## 2024-02-02 PROCEDURE — 85027 COMPLETE CBC AUTOMATED: CPT

## 2024-02-02 PROCEDURE — 83615 LACTATE (LD) (LDH) ENZYME: CPT | Mod: GEALAB

## 2024-02-02 PROCEDURE — 83986 ASSAY PH BODY FLUID NOS: CPT | Mod: GEALAB

## 2024-02-02 PROCEDURE — 32555 ASPIRATE PLEURA W/ IMAGING: CPT | Performed by: INTERNAL MEDICINE

## 2024-02-02 PROCEDURE — 76770 US EXAM ABDO BACK WALL COMP: CPT | Performed by: STUDENT IN AN ORGANIZED HEALTH CARE EDUCATION/TRAINING PROGRAM

## 2024-02-02 PROCEDURE — 99223 1ST HOSP IP/OBS HIGH 75: CPT

## 2024-02-02 PROCEDURE — 2500000004 HC RX 250 GENERAL PHARMACY W/ HCPCS (ALT 636 FOR OP/ED)

## 2024-02-02 PROCEDURE — 94660 CPAP INITIATION&MGMT: CPT

## 2024-02-02 PROCEDURE — 88112 CYTOPATH CELL ENHANCE TECH: CPT | Performed by: PATHOLOGY

## 2024-02-02 PROCEDURE — 84145 PROCALCITONIN (PCT): CPT | Mod: GEALAB

## 2024-02-02 PROCEDURE — 2500000004 HC RX 250 GENERAL PHARMACY W/ HCPCS (ALT 636 FOR OP/ED): Performed by: EMERGENCY MEDICINE

## 2024-02-02 PROCEDURE — 2500000005 HC RX 250 GENERAL PHARMACY W/O HCPCS: Performed by: INTERNAL MEDICINE

## 2024-02-02 PROCEDURE — 82945 GLUCOSE OTHER FLUID: CPT | Mod: GEALAB

## 2024-02-02 PROCEDURE — 87070 CULTURE OTHR SPECIMN AEROBIC: CPT | Mod: GEALAB

## 2024-02-02 PROCEDURE — 82805 BLOOD GASES W/O2 SATURATION: CPT | Mod: MUE

## 2024-02-02 PROCEDURE — 82805 BLOOD GASES W/O2 SATURATION: CPT | Performed by: NURSE PRACTITIONER

## 2024-02-02 PROCEDURE — 2500000002 HC RX 250 W HCPCS SELF ADMINISTERED DRUGS (ALT 637 FOR MEDICARE OP, ALT 636 FOR OP/ED): Mod: MUE | Performed by: INTERNAL MEDICINE

## 2024-02-02 PROCEDURE — 2500000002 HC RX 250 W HCPCS SELF ADMINISTERED DRUGS (ALT 637 FOR MEDICARE OP, ALT 636 FOR OP/ED)

## 2024-02-02 PROCEDURE — 73564 X-RAY EXAM KNEE 4 OR MORE: CPT | Mod: LT

## 2024-02-02 PROCEDURE — 87899 AGENT NOS ASSAY W/OPTIC: CPT | Mod: GEALAB

## 2024-02-02 PROCEDURE — 2500000005 HC RX 250 GENERAL PHARMACY W/O HCPCS: Performed by: FAMILY MEDICINE

## 2024-02-02 PROCEDURE — 84157 ASSAY OF PROTEIN OTHER: CPT | Mod: GEALAB

## 2024-02-02 PROCEDURE — 5A09357 ASSISTANCE WITH RESPIRATORY VENTILATION, LESS THAN 24 CONSECUTIVE HOURS, CONTINUOUS POSITIVE AIRWAY PRESSURE: ICD-10-PCS | Performed by: INTERNAL MEDICINE

## 2024-02-02 PROCEDURE — 32555 ASPIRATE PLEURA W/ IMAGING: CPT | Mod: GC | Performed by: INTERNAL MEDICINE

## 2024-02-02 PROCEDURE — 96375 TX/PRO/DX INJ NEW DRUG ADDON: CPT

## 2024-02-02 PROCEDURE — 87015 SPECIMEN INFECT AGNT CONCNTJ: CPT | Mod: GEALAB

## 2024-02-02 PROCEDURE — 0761T DGTZ GLS MCRSCP SL IMM EA 1: CPT | Mod: TC

## 2024-02-02 RX ORDER — IPRATROPIUM BROMIDE AND ALBUTEROL SULFATE 2.5; .5 MG/3ML; MG/3ML
3 SOLUTION RESPIRATORY (INHALATION)
Status: DISCONTINUED | OUTPATIENT
Start: 2024-02-02 | End: 2024-02-15 | Stop reason: HOSPADM

## 2024-02-02 RX ORDER — CLOPIDOGREL BISULFATE 75 MG/1
75 TABLET ORAL DAILY
Status: DISCONTINUED | OUTPATIENT
Start: 2024-02-02 | End: 2024-02-15 | Stop reason: HOSPADM

## 2024-02-02 RX ORDER — FUROSEMIDE 10 MG/ML
40 INJECTION INTRAMUSCULAR; INTRAVENOUS EVERY 12 HOURS
Status: DISCONTINUED | OUTPATIENT
Start: 2024-02-02 | End: 2024-02-08

## 2024-02-02 RX ORDER — FERROUS GLUCONATE 325 MG
324 TABLET ORAL
Status: DISCONTINUED | OUTPATIENT
Start: 2024-02-02 | End: 2024-02-15 | Stop reason: HOSPADM

## 2024-02-02 RX ORDER — INSULIN GLARGINE 100 [IU]/ML
14 INJECTION, SOLUTION SUBCUTANEOUS NIGHTLY
Status: DISCONTINUED | OUTPATIENT
Start: 2024-02-02 | End: 2024-02-06

## 2024-02-02 RX ORDER — ATORVASTATIN CALCIUM 40 MG/1
40 TABLET, FILM COATED ORAL DAILY
Status: DISCONTINUED | OUTPATIENT
Start: 2024-02-02 | End: 2024-02-15 | Stop reason: HOSPADM

## 2024-02-02 RX ORDER — IPRATROPIUM BROMIDE AND ALBUTEROL SULFATE 2.5; .5 MG/3ML; MG/3ML
3 SOLUTION RESPIRATORY (INHALATION)
Status: DISCONTINUED | OUTPATIENT
Start: 2024-02-02 | End: 2024-02-02

## 2024-02-02 RX ORDER — IPRATROPIUM BROMIDE AND ALBUTEROL SULFATE 2.5; .5 MG/3ML; MG/3ML
3 SOLUTION RESPIRATORY (INHALATION) EVERY 2 HOUR PRN
Status: DISCONTINUED | OUTPATIENT
Start: 2024-02-02 | End: 2024-02-15 | Stop reason: HOSPADM

## 2024-02-02 RX ORDER — MORPHINE SULFATE 4 MG/ML
2 INJECTION, SOLUTION INTRAMUSCULAR; INTRAVENOUS ONCE
Status: COMPLETED | OUTPATIENT
Start: 2024-02-02 | End: 2024-02-02

## 2024-02-02 RX ORDER — LEVOTHYROXINE SODIUM 50 UG/1
50 TABLET ORAL
Status: DISCONTINUED | OUTPATIENT
Start: 2024-02-02 | End: 2024-02-15 | Stop reason: HOSPADM

## 2024-02-02 RX ORDER — VENLAFAXINE HYDROCHLORIDE 75 MG/1
75 CAPSULE, EXTENDED RELEASE ORAL DAILY
Status: DISCONTINUED | OUTPATIENT
Start: 2024-02-02 | End: 2024-02-15 | Stop reason: HOSPADM

## 2024-02-02 RX ORDER — POTASSIUM CHLORIDE 20 MEQ/1
20 TABLET, EXTENDED RELEASE ORAL DAILY
Status: DISCONTINUED | OUTPATIENT
Start: 2024-02-02 | End: 2024-02-15 | Stop reason: HOSPADM

## 2024-02-02 RX ORDER — CEFTRIAXONE 1 G/50ML
1 INJECTION, SOLUTION INTRAVENOUS EVERY 24 HOURS
Status: DISCONTINUED | OUTPATIENT
Start: 2024-02-02 | End: 2024-02-03

## 2024-02-02 RX ORDER — LEVOTHYROXINE SODIUM 100 UG/1
200 TABLET ORAL
Status: DISCONTINUED | OUTPATIENT
Start: 2024-02-02 | End: 2024-02-15 | Stop reason: HOSPADM

## 2024-02-02 RX ORDER — METOPROLOL SUCCINATE 50 MG/1
50 TABLET, EXTENDED RELEASE ORAL 2 TIMES DAILY
Status: DISCONTINUED | OUTPATIENT
Start: 2024-02-02 | End: 2024-02-14

## 2024-02-02 RX ORDER — AZITHROMYCIN 500 MG/1
500 TABLET, FILM COATED ORAL
Status: DISCONTINUED | OUTPATIENT
Start: 2024-02-02 | End: 2024-02-03

## 2024-02-02 RX ORDER — ASPIRIN 81 MG/1
81 TABLET ORAL DAILY
Status: DISCONTINUED | OUTPATIENT
Start: 2024-02-02 | End: 2024-02-15 | Stop reason: HOSPADM

## 2024-02-02 RX ORDER — INSULIN LISPRO 100 [IU]/ML
0-15 INJECTION, SOLUTION INTRAVENOUS; SUBCUTANEOUS
Status: DISCONTINUED | OUTPATIENT
Start: 2024-02-02 | End: 2024-02-15 | Stop reason: HOSPADM

## 2024-02-02 RX ORDER — AMIODARONE HYDROCHLORIDE 200 MG/1
200 TABLET ORAL
Status: DISCONTINUED | OUTPATIENT
Start: 2024-02-02 | End: 2024-02-15 | Stop reason: HOSPADM

## 2024-02-02 RX ORDER — DEXTROSE MONOHYDRATE 100 MG/ML
0.3 INJECTION, SOLUTION INTRAVENOUS ONCE AS NEEDED
Status: DISCONTINUED | OUTPATIENT
Start: 2024-02-02 | End: 2024-02-15 | Stop reason: HOSPADM

## 2024-02-02 RX ORDER — DEXTROSE 50 % IN WATER (D50W) INTRAVENOUS SYRINGE
25
Status: DISCONTINUED | OUTPATIENT
Start: 2024-02-02 | End: 2024-02-15 | Stop reason: HOSPADM

## 2024-02-02 RX ORDER — HEPARIN SODIUM 5000 [USP'U]/ML
5000 INJECTION, SOLUTION INTRAVENOUS; SUBCUTANEOUS EVERY 8 HOURS
Status: DISCONTINUED | OUTPATIENT
Start: 2024-02-02 | End: 2024-02-15 | Stop reason: HOSPADM

## 2024-02-02 RX ORDER — IPRATROPIUM BROMIDE AND ALBUTEROL SULFATE 2.5; .5 MG/3ML; MG/3ML
3 SOLUTION RESPIRATORY (INHALATION) EVERY 2 HOUR PRN
Status: DISCONTINUED | OUTPATIENT
Start: 2024-02-02 | End: 2024-02-02

## 2024-02-02 RX ORDER — AMLODIPINE BESYLATE 5 MG/1
5 TABLET ORAL DAILY
Status: DISCONTINUED | OUTPATIENT
Start: 2024-02-02 | End: 2024-02-10

## 2024-02-02 RX ORDER — PANTOPRAZOLE SODIUM 40 MG/1
40 TABLET, DELAYED RELEASE ORAL 2 TIMES DAILY
Status: DISCONTINUED | OUTPATIENT
Start: 2024-02-02 | End: 2024-02-15 | Stop reason: HOSPADM

## 2024-02-02 RX ORDER — IPRATROPIUM BROMIDE AND ALBUTEROL SULFATE 2.5; .5 MG/3ML; MG/3ML
3 SOLUTION RESPIRATORY (INHALATION) EVERY 6 HOURS PRN
Status: DISCONTINUED | OUTPATIENT
Start: 2024-02-02 | End: 2024-02-02

## 2024-02-02 RX ADMIN — METOPROLOL SUCCINATE 50 MG: 50 TABLET, EXTENDED RELEASE ORAL at 20:37

## 2024-02-02 RX ADMIN — PANTOPRAZOLE SODIUM 40 MG: 40 TABLET, DELAYED RELEASE ORAL at 11:18

## 2024-02-02 RX ADMIN — HEPARIN SODIUM 5000 UNITS: 5000 INJECTION INTRAVENOUS; SUBCUTANEOUS at 20:36

## 2024-02-02 RX ADMIN — LEVOTHYROXINE SODIUM 200 MCG: 100 TABLET ORAL at 11:17

## 2024-02-02 RX ADMIN — LEVOTHYROXINE SODIUM 50 MCG: 50 TABLET ORAL at 11:19

## 2024-02-02 RX ADMIN — Medication 40 PERCENT: at 22:15

## 2024-02-02 RX ADMIN — ATORVASTATIN CALCIUM 40 MG: 40 TABLET, FILM COATED ORAL at 11:17

## 2024-02-02 RX ADMIN — FERROUS GLUCONATE 324 MG: 324 TABLET ORAL at 11:18

## 2024-02-02 RX ADMIN — Medication 4 L/MIN: at 12:30

## 2024-02-02 RX ADMIN — IPRATROPIUM BROMIDE AND ALBUTEROL SULFATE 3 ML: 2.5; .5 SOLUTION RESPIRATORY (INHALATION) at 07:31

## 2024-02-02 RX ADMIN — AMLODIPINE BESYLATE 5 MG: 5 TABLET ORAL at 11:17

## 2024-02-02 RX ADMIN — AZITHROMYCIN DIHYDRATE 500 MG: 500 TABLET ORAL at 11:31

## 2024-02-02 RX ADMIN — IPRATROPIUM BROMIDE AND ALBUTEROL SULFATE 3 ML: 2.5; .5 SOLUTION RESPIRATORY (INHALATION) at 20:03

## 2024-02-02 RX ADMIN — Medication 40 PERCENT: at 05:17

## 2024-02-02 RX ADMIN — INSULIN LISPRO 3 UNITS: 100 INJECTION, SOLUTION INTRAVENOUS; SUBCUTANEOUS at 20:35

## 2024-02-02 RX ADMIN — VENLAFAXINE HYDROCHLORIDE 75 MG: 75 CAPSULE, EXTENDED RELEASE ORAL at 11:31

## 2024-02-02 RX ADMIN — MORPHINE SULFATE 2 MG: 4 INJECTION, SOLUTION INTRAMUSCULAR; INTRAVENOUS at 01:55

## 2024-02-02 RX ADMIN — FUROSEMIDE 40 MG: 10 INJECTION, SOLUTION INTRAMUSCULAR; INTRAVENOUS at 11:18

## 2024-02-02 RX ADMIN — AMIODARONE HYDROCHLORIDE 200 MG: 200 TABLET ORAL at 11:18

## 2024-02-02 RX ADMIN — FUROSEMIDE 40 MG: 10 INJECTION, SOLUTION INTRAMUSCULAR; INTRAVENOUS at 20:34

## 2024-02-02 RX ADMIN — IPRATROPIUM BROMIDE AND ALBUTEROL SULFATE 3 ML: 2.5; .5 SOLUTION RESPIRATORY (INHALATION) at 14:58

## 2024-02-02 RX ADMIN — METOPROLOL SUCCINATE 50 MG: 50 TABLET, EXTENDED RELEASE ORAL at 11:18

## 2024-02-02 RX ADMIN — PANTOPRAZOLE SODIUM 40 MG: 40 TABLET, DELAYED RELEASE ORAL at 20:37

## 2024-02-02 RX ADMIN — Medication 40 PERCENT: at 23:13

## 2024-02-02 RX ADMIN — ASPIRIN 81 MG: 81 TABLET, COATED ORAL at 11:17

## 2024-02-02 RX ADMIN — Medication 40 PERCENT: at 20:04

## 2024-02-02 RX ADMIN — CLOPIDOGREL 75 MG: 75 TABLET ORAL at 11:17

## 2024-02-02 RX ADMIN — CEFTRIAXONE SODIUM 1 G: 1 INJECTION, SOLUTION INTRAVENOUS at 10:04

## 2024-02-02 RX ADMIN — HEPARIN SODIUM 5000 UNITS: 5000 INJECTION INTRAVENOUS; SUBCUTANEOUS at 15:30

## 2024-02-02 RX ADMIN — HEPARIN SODIUM 5000 UNITS: 5000 INJECTION INTRAVENOUS; SUBCUTANEOUS at 06:52

## 2024-02-02 RX ADMIN — METHYLPREDNISOLONE SODIUM SUCCINATE 40 MG: 40 INJECTION, POWDER, FOR SOLUTION INTRAMUSCULAR; INTRAVENOUS at 11:18

## 2024-02-02 RX ADMIN — INSULIN GLARGINE 14 UNITS: 100 INJECTION, SOLUTION SUBCUTANEOUS at 20:35

## 2024-02-02 SDOH — SOCIAL STABILITY: SOCIAL INSECURITY: ARE THERE ANY APPARENT SIGNS OF INJURIES/BEHAVIORS THAT COULD BE RELATED TO ABUSE/NEGLECT?: NO

## 2024-02-02 SDOH — SOCIAL STABILITY: SOCIAL INSECURITY: HAVE YOU HAD THOUGHTS OF HARMING ANYONE ELSE?: NO

## 2024-02-02 SDOH — SOCIAL STABILITY: SOCIAL INSECURITY: ABUSE: ADULT

## 2024-02-02 SDOH — SOCIAL STABILITY: SOCIAL INSECURITY: DO YOU FEEL ANYONE HAS EXPLOITED OR TAKEN ADVANTAGE OF YOU FINANCIALLY OR OF YOUR PERSONAL PROPERTY?: NO

## 2024-02-02 SDOH — SOCIAL STABILITY: SOCIAL INSECURITY: DOES ANYONE TRY TO KEEP YOU FROM HAVING/CONTACTING OTHER FRIENDS OR DOING THINGS OUTSIDE YOUR HOME?: NO

## 2024-02-02 SDOH — SOCIAL STABILITY: SOCIAL INSECURITY: HAS ANYONE EVER THREATENED TO HURT YOUR FAMILY OR YOUR PETS?: NO

## 2024-02-02 SDOH — SOCIAL STABILITY: SOCIAL INSECURITY: WERE YOU ABLE TO COMPLETE ALL THE BEHAVIORAL HEALTH SCREENINGS?: YES

## 2024-02-02 SDOH — SOCIAL STABILITY: SOCIAL INSECURITY: DO YOU FEEL UNSAFE GOING BACK TO THE PLACE WHERE YOU ARE LIVING?: NO

## 2024-02-02 SDOH — SOCIAL STABILITY: SOCIAL INSECURITY: ARE YOU OR HAVE YOU BEEN THREATENED OR ABUSED PHYSICALLY, EMOTIONALLY, OR SEXUALLY BY ANYONE?: NO

## 2024-02-02 ASSESSMENT — ENCOUNTER SYMPTOMS
DIZZINESS: 0
HEMATOLOGIC/LYMPHATIC NEGATIVE: 1
CHILLS: 0
CARDIOVASCULAR NEGATIVE: 1
ABDOMINAL PAIN: 0
MUSCULOSKELETAL NEGATIVE: 1
ABDOMINAL DISTENTION: 1
CONSTITUTIONAL NEGATIVE: 1
GASTROINTESTINAL NEGATIVE: 1
WEAKNESS: 1
RESPIRATORY NEGATIVE: 1
NEUROLOGICAL NEGATIVE: 1
FATIGUE: 1
PALPITATIONS: 1
FEVER: 0
PSYCHIATRIC NEGATIVE: 1
ENDOCRINE NEGATIVE: 1
SHORTNESS OF BREATH: 1
ALLERGIC/IMMUNOLOGIC NEGATIVE: 1

## 2024-02-02 ASSESSMENT — PATIENT HEALTH QUESTIONNAIRE - PHQ9
SUM OF ALL RESPONSES TO PHQ9 QUESTIONS 1 & 2: 6
1. LITTLE INTEREST OR PLEASURE IN DOING THINGS: NEARLY EVERY DAY
2. FEELING DOWN, DEPRESSED OR HOPELESS: NEARLY EVERY DAY

## 2024-02-02 ASSESSMENT — ACTIVITIES OF DAILY LIVING (ADL)
DRESSING YOURSELF: NEEDS ASSISTANCE
HEARING - RIGHT EAR: DIFFICULTY WITH NOISE
FEEDING YOURSELF: INDEPENDENT
ASSISTIVE_DEVICE: DENTURES UPPER;DENTURES LOWER;HEARING AID - LEFT;HEARING AID - RIGHT
TOILETING: NEEDS ASSISTANCE
LACK_OF_TRANSPORTATION: NO
JUDGMENT_ADEQUATE_SAFELY_COMPLETE_DAILY_ACTIVITIES: YES
GROOMING: NEEDS ASSISTANCE
HEARING - LEFT EAR: DIFFICULTY WITH NOISE
PATIENT'S MEMORY ADEQUATE TO SAFELY COMPLETE DAILY ACTIVITIES?: YES
ADEQUATE_TO_COMPLETE_ADL: YES
WALKS IN HOME: NEEDS ASSISTANCE
BATHING: NEEDS ASSISTANCE

## 2024-02-02 ASSESSMENT — COGNITIVE AND FUNCTIONAL STATUS - GENERAL
PERSONAL GROOMING: A LITTLE
DAILY ACTIVITIY SCORE: 17
STANDING UP FROM CHAIR USING ARMS: A LOT
DRESSING REGULAR LOWER BODY CLOTHING: A LOT
MOVING FROM LYING ON BACK TO SITTING ON SIDE OF FLAT BED WITH BEDRAILS: A LITTLE
MOVING TO AND FROM BED TO CHAIR: A LOT
HELP NEEDED FOR BATHING: A LITTLE
DRESSING REGULAR UPPER BODY CLOTHING: A LITTLE
CLIMB 3 TO 5 STEPS WITH RAILING: A LOT
MOBILITY SCORE: 14
WALKING IN HOSPITAL ROOM: A LOT
TOILETING: A LOT
TURNING FROM BACK TO SIDE WHILE IN FLAT BAD: A LITTLE
PATIENT BASELINE BEDBOUND: NO

## 2024-02-02 ASSESSMENT — PAIN SCALES - GENERAL
PAINLEVEL_OUTOF10: 0 - NO PAIN
PAINLEVEL_OUTOF10: 10 - WORST POSSIBLE PAIN
PAINLEVEL_OUTOF10: 0 - NO PAIN
PAINLEVEL_OUTOF10: 0 - NO PAIN
PAINLEVEL_OUTOF10: 2

## 2024-02-02 ASSESSMENT — LIFESTYLE VARIABLES
HOW MANY STANDARD DRINKS CONTAINING ALCOHOL DO YOU HAVE ON A TYPICAL DAY: PATIENT DOES NOT DRINK
AUDIT-C TOTAL SCORE: 0
SKIP TO QUESTIONS 9-10: 1
AUDIT-C TOTAL SCORE: 0
HOW OFTEN DO YOU HAVE A DRINK CONTAINING ALCOHOL: NEVER
HOW OFTEN DO YOU HAVE 6 OR MORE DRINKS ON ONE OCCASION: NEVER

## 2024-02-02 ASSESSMENT — PAIN - FUNCTIONAL ASSESSMENT
PAIN_FUNCTIONAL_ASSESSMENT: 0-10
PAIN_FUNCTIONAL_ASSESSMENT: 0-10

## 2024-02-02 NOTE — CONSULTS
"Inpatient consult to Nephrology  Consult performed by: John Pena MD  Consult ordered by: DO Valencia Silva       Daphne Morris is a 68 y.o. female who has past medical history of heart failure, atrial fibrillation, Hypertension, type 2 DM who was admitted for acute hypoxic respiratory failure 2/2 to pneumonia and possible acute decompensated heart failure. Nephrology consulted for worsening ANDREA.    Patient awake, alert, and on BiPAP.  Not able to give much history      Objective   /59   Pulse 50   Temp 36.6 °C (97.9 °F) (Temporal)   Resp 16   Ht 1.676 m (5' 6\")   Wt 99.8 kg (220 lb 0.3 oz)   SpO2 98%   BMI 35.51 kg/m²   Wt Readings from Last 3 Encounters:   02/02/24 99.8 kg (220 lb 0.3 oz)   02/01/24 89.4 kg (197 lb)   01/29/24 89.4 kg (197 lb)       Physical Exam    No intake or output data in the 24 hours ending 02/02/24 1057      General appearance: no distress awake and alert on room air, euvolemic on exam  Eyes: non-icteric  HEENT: atrumatic head, PEERLA, moist mucosa  Skin: no apparent rash  Heart: NSR, S1, S2 normal, no murmur or gallop  Lungs: Symmetrical expansion, course breath sounds bilaterally  Abdomen: soft, nt/nd, obese  Extremities: significant edema of both lower and upper extremities bilaterally  Neuro: No FND,asterixis, no focal deficits noticed        Review of Systems  Not applicable        Data Review        Results from last 7 days   Lab Units 02/02/24  0438 02/01/24  0515   WBC AUTO x10*3/uL 5.9 6.7   HEMOGLOBIN g/dL 9.4* 9.5*   HEMATOCRIT % 32.6* 32.8*   PLATELETS AUTO x10*3/uL 278 295            No results found for: \"URICACID\"        Lab Results   Component Value Date    HGBA1C 9.4 (H) 10/18/2023           Results from last 7 days   Lab Units 02/02/24  0438 02/01/24  1443 02/01/24  0515   SODIUM mmol/L 142 141 140   POTASSIUM mmol/L 4.9 5.4* 5.4*   CHLORIDE mmol/L 106 105 105   CO2 mmol/L 31 29 29   BUN mg/dL 47* 49* 49*   GLUCOSE mg/dL 183* " "186* 220*   CALCIUM mg/dL 7.5* 7.3* 7.5*   ANION GAP mmol/L 10 12 11   CREATININE mg/dL 3.21* 3.15* 3.25*   EGFR mL/min/1.73m*2 15* 15* 15*           No results found for: \"MICROALBCREA\"         RFP  Recent Labs     02/02/24  0438 02/01/24  1443 02/01/24  0515 12/15/23  1447 10/18/23  1315 07/26/23  1727 07/23/23  0530 07/19/23  1431 06/09/23  1350 05/11/23  1801 05/11/23  1027 05/03/23  0609 05/02/23  0607 05/01/23  0544 04/30/23  0603 12/09/21  1144 08/10/21  0829    141 140 139 143 139 136   < > 135   < > 138   < > 133* 134* 137   < > 139   K 4.9 5.4* 5.4* 4.2 4.2 3.4* 3.6   < > 4.3   < > 3.8   < > 4.1 3.9 3.4*   < > 4.4    105 105 102 104 97* 96*   < > 96*   < > 97   < > 95* 95* 96*   < > 104   CO2 31 29 29 31 31 32 33*   < > 29   < > 31   < > 31 30 32   < > 23*   BUN 47* 49* 49* 34* 35* 49* 46*   < > 27*   < > 38*   < > 48* 39* 38*   < > 15   CREATININE 3.21* 3.15* 3.25* 2.40* 2.43* 2.72* 2.90*   < > 1.9*   < > 2.2*   < > 2.73* 2.48* 2.33*   < > 1.1   GLUCOSE 183* 186* 220* 352* 278* 343* 98   < > 429*   < > 272*   < > 221* 222* 215*   < > 94   CALCIUM 7.5* 7.3* 7.5* 7.8* 7.8* 7.9* 8.1*   < > 8.5   < > 8.4*   < > 8.3* 8.1* 7.9*   < > 8.9   PHOS 6.0*  --   --   --   --   --  4.2  --   --   --  4.2  --  4.8 4.5 4.2  --  3.0   EGFR 15* 15* 15* 21* 21*  --   --   --  28  --  24  --   --   --   --    < >  --    ANIONGAP 10 12 11 10 12 13 11   < > 10   < > 10   < > 11 13 12   < > 13    < > = values in this interval not displayed.        Urineanalysis  Recent Labs     02/01/24  0515 07/19/23  1437 04/26/23  1740 05/03/21  2035 04/23/21  0310 04/06/21  1200 04/10/18  1415   COLORU Yellow STRAW YELLOW YELLOW YELLOW PALE YELLOW YELLOW   APPEARANCEU Clear CLEAR CLEAR CLEAR HAZY HAZY CLOUDY   SPECGRAVU 1.013 1.008 1.009 1.012 1.020 1.005 1.017   SYLVIE 5.0 6.0 6.0 6.5 6.0 6.5 5.5   PROTUR >=500 (3+)* >=500 (3+)* 100(2+)* 300* 600* 100* 30*   GLUCOSEU >=500 (3+)* 150 (2+)* 50(1+)* 100* 150* 50* 300*   BLOODU " NEGATIVE LARGE (3+)* SMALL(1+)* TRACE* TRACE* MODERATE* TRACE*   KETONESU NEGATIVE NEGATIVE NEGATIVE NEGATIVE NEGATIVE NEGATIVE NEGATIVE   BILIRUBINU NEGATIVE NEGATIVE NEGATIVE NEGATIVE NEGATIVE NEGATIVE NEGATIVE   NITRITEU NEGATIVE NEGATIVE NEGATIVE NEGATIVE NEGATIVE POSITIVE* NEGATIVE   LEUKOCYTESU NEGATIVE NEGATIVE NEGATIVE NEGATIVE SMALL* LARGE* LARGE*       Urine Electrolytes  Recent Labs     02/01/24 0515 07/19/23  1437 04/30/23  1316 04/26/23 1740 05/03/21 2035 04/23/21 0310 04/06/21  1200 04/10/18  1415   SODIUMURR  --   --  27  --   --   --   --   --    NACREATUR  --   --  27  --   --   --   --   --    CREATU  --   --  101.0  --   --   --   --   --    PROTUR >=500 (3+)* >=500 (3+)*  --  100(2+)* 300* 600* 100* 30*        Urine Micro  Recent Labs     02/01/24 0515 07/19/23  1437 04/26/23 1740 05/03/21 2035 04/23/21 0310 04/06/21  1200 04/10/18  1415   WBCU 1-5 5* 1 NONE SEEN 53* LOADED 1,077*   RBCU 3-5 >182* 1 2-4 5* 16-25 5*   HYALCASTU OCCASIONAL*  --   --   --  70  --  3   SQUAMEPIU  --  1 1  --  MODERATE  --  FEW   BACTERIAU 1+* 1+*  --   --  POSITIVE PRESENT  Performed at [facility] [address] [facility] [address]   POSITIVE   MUCUSU FEW FEW  --   --   --   --   --         Iron  Recent Labs     05/02/23  0607 04/28/23  0615 04/27/23  0624 02/23/23  1113   IRON  --   --  26* 54   TIBC  --   --  289 281   IRONSAT  --   --  9* 19.2   FERRITIN 71 80  --  42        amiodarone, 200 mg, oral, Daily with breakfast  amLODIPine, 5 mg, oral, Daily  aspirin, 81 mg, oral, Daily  atorvastatin, 40 mg, oral, Daily  azithromycin, 500 mg, oral, q24h SUMIT  cefTRIAXone, 1 g, intravenous, q24h  clopidogrel, 75 mg, oral, Daily  ferrous gluconate, 324 mg, oral, Daily with breakfast  furosemide, 40 mg, intravenous, q12h  heparin (porcine), 5,000 Units, subcutaneous, q8h  insulin glargine, 14 Units, subcutaneous, Nightly  insulin lispro, 0-15 Units, subcutaneous, TID with meals  ipratropium-albuteroL, 3 mL,  nebulization, TID  levothyroxine, 200 mcg, oral, Daily before breakfast  levothyroxine, 50 mcg, oral, Daily before breakfast  methylPREDNISolone sodium succinate (PF), 40 mg, intravenous, Daily  metoprolol succinate XL, 50 mg, oral, BID  pantoprazole, 40 mg, oral, BID  [Held by provider] potassium chloride CR, 20 mEq, oral, Daily  [Held by provider] SITagliptin phosphate, 100 mg, oral, Daily  venlafaxine XR, 75 mg, oral, Daily        Assessment and Plan       Daphne Morris  is a 68 y.o. female who has past medical history of  heart failure, atrial fibrillation s/p Watchman, Hypertension, type 2 DM admitted for acute hypoxic respiratory failure 2/2 to pneumonia and possible acute decompensated heart failure.      # ANDREA  -Likely # Cardiorenal syndrome 2/2 # Acute decompensated CHF  - Baseline SCr 1.8-2.5 most likely atherosclerotic cardiovascular disease/diabetic nephropathy  - Most recent Scr 3.21 with eGFR 15  - Most likely related to hypoperfusion 2/2 acutely decompensated congestive heart failure  - Urine analysis showed 3+ protien in urine, negative blood, 3-5 RBCs, 1-5 WBCs  - no prior kidney US  - medical team started IV lasix    Plan  - urine electrolytes  - urine albumin  - renal US  - strict I/Os, daily weight  - agree with diuresis  - avoid contrast if possible  - would recommend starting an SGLT2 inhibitor outpatient if GFR returns to >25    # Acute hypoxic hypercapnic respiratory failure  # Pleural effusion  # Acute decompensated CHF  # Community acquired pneumonia  # possible COPD exacerbation  - CXR: Large right pleural effusion  - CT chest: pleural effusion with complete collapse of RLL, Atelectasis/consolidation at RUL and RML, small pericardial effusion  - Last echo w/ LVEF of 55% and small pericardial effusion  - VBG this morning: pH 7.20, pCO2 74  - on BiPAP  - on IV ceftriaxone, abx per medical team  - pulm and cardiology are consulted    Ricki Blackwood, DO  Internal Medicine PGY-1

## 2024-02-02 NOTE — PROGRESS NOTES
Emergency Medicine Transition of Care Note.    I received Dapnhe Morris in signout from Dr. Guido.  Please see the previous ED provider note for all HPI, PE and MDM up to the time of signout at change of shift. This is in addition to the primary record.    In brief Daphne Morris is an 68 y.o. female presenting for   Chief Complaint   Patient presents with    Weakness, Gen     Patient was unable to get out of her chair tonight and called 911 for assistance. They have been called multiple times this week so they brought her in for evaluation for not being able to care for herself.      At the time of signout we were awaiting: Transfer to [facility]    Diagnoses as of 02/02/24 0110   Pneumonia due to infectious organism, unspecified laterality, unspecified part of lung   COPD exacerbation (CMS/HCC)   Unsteady gait   Acute renal failure superimposed on chronic kidney disease, unspecified acute renal failure type, unspecified CKD stage (CMS/HCC)   Hyperkalemia   Hyperglycemia       Medical Decision Making  Patient signed out to me pending transfer to [facility].  Patient with pneumonia and congestive heart failure.  She has been treated already with Lasix and Bumex.  Complaining of pain.  Given 2 mg of morphine.  Has remained unchanged.  Pending transfer to [facility].    Final diagnoses:   [J18.9] Pneumonia due to infectious organism, unspecified laterality, unspecified part of lung   [J44.1] COPD exacerbation (CMS/HCC)   [R26.81] Unsteady gait   [N17.9, N18.9] Acute renal failure superimposed on chronic kidney disease, unspecified acute renal failure type, unspecified CKD stage (CMS/HCC)   [E87.5] Hyperkalemia   [R73.9] Hyperglycemia           Procedure  Procedures    Hunter Díaz MD

## 2024-02-02 NOTE — PROCEDURES
Thoracentesis Procedure Note    Pre-operative Diagnosis: Pleural effusion    Post-operative Diagnosis: same    Indications: Pleural effusion    Procedure Details     Consent: Informed consent was obtained. Risks of the procedure were discussed including: infection, bleeding, pain, pneumothorax.    Under sterile conditions the patient was positioned. Betadine solution and sterile drapes were utilized.  2% buffered lidocaine was used to anesthetize the 8th rib space. Fluid was obtained without any difficulties and minimal blood loss.  A dressing was applied to the wound and wound care instructions were provided.     Findings  2300 ml of clear pleural fluid was obtained. A sample was sent to Pathology for cytogenetics, flow, and cell counts, as well as for infection analysis.    Complications:  None; patient tolerated the procedure well.          Condition: stable    Plan  A follow up chest x-ray was ordered.  Bed Rest for 1 hours.  Tylenol 650 mg. for pain.    Attending Attestation:

## 2024-02-02 NOTE — CONSULTS
Reason For Consult  Pleural effusion    Subjective    Daphne Morris is a 68 y.o. female presenting for dyspnea and increasing oxygen needs, admitted with acute hypoxic respiratory failure 2/2 pneumonia and pleural effusion for which pulmonology was consulted. Has a history of CHF, atrial fibrillation HTN, COPD, DM2, and CKD. 2 weeks of increasing dyspnea at rest. Noted to have increasing oxygen requirements, so EMS was called. Patient placed on bipap in ED and brought to SDU.     Objective    Physical Exam  Constitutional:       General: She is not in acute distress.     Appearance: Normal appearance. She is obese. She is ill-appearing. She is not toxic-appearing or diaphoretic.      Interventions: Face mask in place.   HENT:      Head: Normocephalic and atraumatic.      Nose: Nose normal.      Mouth/Throat:      Mouth: Mucous membranes are moist.      Pharynx: Oropharynx is clear.   Eyes:      Extraocular Movements: Extraocular movements intact.      Conjunctiva/sclera: Conjunctivae normal.      Pupils: Pupils are equal, round, and reactive to light.   Cardiovascular:      Rate and Rhythm: Normal rate. Rhythm irregular.      Pulses: Normal pulses.      Heart sounds: Normal heart sounds. No murmur heard.     No friction rub. No gallop.   Pulmonary:      Effort: Respiratory distress present.      Breath sounds: Wheezing present. No rhonchi or rales.   Abdominal:      General: Abdomen is flat. Bowel sounds are normal. There is no distension.      Palpations: Abdomen is soft.      Tenderness: There is no abdominal tenderness. There is no guarding.      Hernia: No hernia is present.   Musculoskeletal:         General: Signs of injury present. Normal range of motion.      Right lower leg: Edema present.      Left lower leg: Edema present.   Skin:     General: Skin is warm and dry.      Capillary Refill: Capillary refill takes less than 2 seconds.      Findings: Erythema and lesion present.   Neurological:      General:  No focal deficit present.      Mental Status: She is oriented to person, place, and time and easily aroused. She is lethargic.     Temp:  [36.1 °C (97 °F)-36.6 °C (97.9 °F)] 36.1 °C (97 °F)  Heart Rate:  [45-56] 48  Resp:  [12-25] 16  BP: (105-191)/() 143/66  FiO2 (%):  [40 %] 40 %    Vitals:    02/02/24 1600   Weight: 99.8 kg (220 lb 0.3 oz)     I/Os    Intake/Output Summary (Last 24 hours) at 2/2/2024 1706  Last data filed at 2/2/2024 1035  Gross per 24 hour   Intake 50 ml   Output --   Net 50 ml     Labs:   Results from last 72 hours   Lab Units 02/02/24  0438 02/01/24  1443 02/01/24  0515   SODIUM mmol/L 142 141 140   POTASSIUM mmol/L 4.9 5.4* 5.4*   CHLORIDE mmol/L 106 105 105   CO2 mmol/L 31 29 29   BUN mg/dL 47* 49* 49*   CREATININE mg/dL 3.21* 3.15* 3.25*   GLUCOSE mg/dL 183* 186* 220*   CALCIUM mg/dL 7.5* 7.3* 7.5*   ANION GAP mmol/L 10 12 11   EGFR mL/min/1.73m*2 15* 15* 15*   PHOSPHORUS mg/dL 6.0*  --   --       Results from last 72 hours   Lab Units 02/02/24  0438 02/01/24  0515   WBC AUTO x10*3/uL 5.9 6.7   HEMOGLOBIN g/dL 9.4* 9.5*   HEMATOCRIT % 32.6* 32.8*   PLATELETS AUTO x10*3/uL 278 295   NEUTROS PCT AUTO %  --  65.0   LYMPHS PCT AUTO %  --  22.1   MONOS PCT AUTO %  --  9.7   EOS PCT AUTO %  --  1.9      Lab Results   Component Value Date    CALCIUM 7.5 (L) 02/02/2024    PHOS 6.0 (H) 02/02/2024      Micro/ID:   No results found for the last 90 days.    Images:  XR knee left 4+ views  Narrative: Interpreted By:  Adama Jonas,   STUDY:  Left knee dated  2/2/2024.      INDICATION:  Signs/Symptoms:left kne pain      COMPARISON:  Radiographs dated 10/23/2018.      ACCESSION NUMBER(S):  [id number]      ORDERING CLINICIAN:  JOSUÉ REICH      TECHNIQUE:  Four views of the left knee.      FINDINGS:  No fracture or dislocation is evident.  There is a small knee joint  effusion. There is moderate tricompartmental degenerative change of  the knee. Reticular increased density is seen in the  subcutaneous  tissues.      Impression: 1. Small joint effusion and degenerative change of the knee without  osseous injury evident. Knowledge of any trauma may be helpful. If  there is persistent concern for osseous injury, cross-sectional  imaging can be considered.  2. Reticular increased density in the subcutaneous tissues which may  represent lymphedema and/or cellulitis.      MACRO:  None      Signed by: Adama Jonas 2/2/2024 4:24 PM  Dictation workstation:   [id number]  XR chest 1 view  Narrative: Interpreted By:  Rayna Carson,   STUDY:  XR CHEST 1 VIEW;  2/2/2024 2:29 pm      INDICATION:  Signs/Symptoms:S/p thoracentesis.      COMPARISON:  02/01/2024      ACCESSION NUMBER(S):  [id number]      ORDERING CLINICIAN:  AIDAN HERNANDEZ      TECHNIQUE:  Portable AP upright      FINDINGS:  The cardiac silhouette is enlarged but unchanged. Aorta is  atherosclerotic. There is marked improvement in the right pleural  effusion since the prior study as a result of interval thoracentesis.  No pneumothorax is present. There is minimal fluid residual blunting  the costophrenic angle. There is minimal atelectasis at the right  lung base. Pulmonary vasculature appears congested. No interval  change is noted in the osseous structures.      Impression: Marked interval improvement in the right pleural effusion with  interval thoracentesis. No pneumothorax.      Pulmonary vascular congestion      Minimal atelectasis right lung base      MACRO:  None.      Signed by: Rayna Carson 2/2/2024 3:23 PM  Dictation workstation:   [id number]  US renal complete  Narrative: Interpreted By:  Amando Starks,   STUDY:  US RENAL COMPLETE; 2/2/2024 12:23 pm      INDICATION:  Signs/Symptoms:ANDREA on CKD.      COMPARISON:  Renal ultrasound 05/01/2023      ACCESSION NUMBER(S):  [id number]      ORDERING CLINICIAN:  JESSICA EPPS      TECHNIQUE:  Sonography of the kidneys and urinary bladder was performed.      FINDINGS:  Right  Kidney: Right total nephrectomy.      Left Kidney:  *Renal length: 10.6 cm  *Parenchyma: Normal parenchymal echogenicity. Normal parenchymal  thickness. *Collecting system: No hydronephrosis.  *Calculus: No echogenic, shadowing calculus.  *Lesion: None.      Bladder: Normal sonographic appearance.      Impression: No left hydronephrosis. Right total nephrectomy.      MACRO:  None      Signed by: Amando Starks 2/2/2024 2:36 PM  Dictation workstation:   [id number]     Meds    Scheduled medications  amiodarone, 200 mg, oral, Daily with breakfast  amLODIPine, 5 mg, oral, Daily  aspirin, 81 mg, oral, Daily  atorvastatin, 40 mg, oral, Daily  azithromycin, 500 mg, oral, q24h SUMIT  cefTRIAXone, 1 g, intravenous, q24h  clopidogrel, 75 mg, oral, Daily  ferrous gluconate, 324 mg, oral, Daily with breakfast  furosemide, 40 mg, intravenous, q12h  heparin (porcine), 5,000 Units, subcutaneous, q8h  insulin glargine, 14 Units, subcutaneous, Nightly  insulin lispro, 0-15 Units, subcutaneous, TID with meals  ipratropium-albuteroL, 3 mL, nebulization, TID  levothyroxine, 200 mcg, oral, Daily before breakfast  levothyroxine, 50 mcg, oral, Daily before breakfast  methylPREDNISolone sodium succinate (PF), 40 mg, intravenous, Daily  metoprolol succinate XL, 50 mg, oral, BID  pantoprazole, 40 mg, oral, BID  [Held by provider] potassium chloride CR, 20 mEq, oral, Daily  [Held by provider] SITagliptin phosphate, 25 mg, oral, Daily  venlafaxine XR, 75 mg, oral, Daily    PRN medications  PRN medications: dextrose 10 % in water (D10W), dextrose, glucagon, ipratropium-albuteroL, oxygen, oxygen, oxygen     Assessment    Daphne Morris is a 68 y.o. female presenting for dyspnea and increasing oxygen needs, admitted with acute hypoxic respiratory failure 2/2 pneumonia and pleural effusion for which pulmonology was consulted. Has a history of CHF, atrial fibrillation HTN, COPD, DM2, and CKD. Pleural effusion likely transudate 2/2 CHF. Suspect CAP  and possible COPD exacerbation.    Recommendations:  - S/p thoracentesis with resolution of pleural effusion. 2.3L drained sent for cytology and chemistry  - Chest x-ray after thora showed no evidence of pneumothorax  - Continues to have respiratory acidosis, increase BIPAP to 25 over 5 at 40% rate 20. VBG at 1800 and again tomorrow morning.  - Continue ceftriaxone and azithromycin

## 2024-02-02 NOTE — H&P
History Of Present Illness  Daphne Morris is a 68 y.o. female presenting with past medical history of heart failure, atrial fibrillation, Hypertension, type 2 DM transfer from [facility] due to acute hypoxic respiratory failure secondary to pneumonia. Patient states that 2 days ago, she had difficulty getting out of a chair. Patient state that she sat in the chair for around 13 hours. At that time, she felt short of breath and weak. Patient states that she has been feeling short of breath for a couple of weeks now. Also reports paroxysmal nocturnal dyspnea and bilateral leg swelling. She states that she saw dr Peterson a week ago. At that time, she still felt short of breath. Patient denies fever, chest pain, nausea, vomiting, abdominal pain or leg pain.     Past Medical History  She has a past medical history of Acute CHF (CMS/HCC), Arthritis, Atrial fibrillation (CMS/HCC), Chronic diastolic CHF (congestive heart failure) (CMS/HCC), COPD (chronic obstructive pulmonary disease) (CMS/HCC), CVA (cerebral vascular accident) (CMS/HCC), Depression, Diabetes (CMS/HCC), Essential tremor, HTN (hypertension), Hyperlipidemia, Hypothyroidism, Impacted cerumen, left ear, Left ventricular ejection fraction greater than 40%, Noncompliance, Personal history of other diseases of the respiratory system, Renal carcinoma, right (CMS/HCC), and Vitamin D deficiency.    Surgical History  She has a past surgical history that includes MR angio chest w and wo IV contrast (2023); MR angio head wo IV contrast (2021);  section, classic; Shoulder surgery; Ankle surgery; Cataract extraction (Right); and Nephrectomy (2021).     Social History  She reports that she has been smoking cigarettes. She started smoking about 55 years ago. She has a 25.00 pack-year smoking history. She has never been exposed to tobacco smoke. She has never used smokeless tobacco. She reports that she does not currently use alcohol. She reports  current drug use. Drug: Marijuana.    Family History  Family History   Problem Relation Name Age of Onset    Hypertension Mother      Diabetes Father      Heart disease Father      Cancer Sister      Cancer Brother      Diabetes Daughter      Asthma Daughter      Heart attack Daughter      Diabetes Maternal Grandfather      Heart disease Paternal Grandmother          Allergies  Adhesive tape-silicones, Amoxicillin, Bee venom protein (honey bee), Codeine, Doxycycline, Latex, Lisinopril, Prednisone, Citalopram, Oseltamivir, and Phenyleph-shark oil-glyc-pet    Review of Systems   Constitutional: Negative.    HENT: Negative.     Respiratory: Negative.     Cardiovascular: Negative.    Gastrointestinal: Negative.    Endocrine: Negative.    Genitourinary: Negative.    Musculoskeletal: Negative.    Skin: Negative.    Allergic/Immunologic: Negative.    Neurological: Negative.    Hematological: Negative.    Psychiatric/Behavioral: Negative.          Physical Exam  Cardiovascular:      Rate and Rhythm: Normal rate. Rhythm irregular.   Pulmonary:      Comments: Decrease breath sound in both lungs  Abdominal:      General: Abdomen is flat. Bowel sounds are normal.      Palpations: Abdomen is soft.   Musculoskeletal:      Right lower leg: Edema present.      Left lower leg: Edema present.   Skin:     General: Skin is warm.      Capillary Refill: Capillary refill takes less than 2 seconds.   Neurological:      General: No focal deficit present.      Mental Status: She is alert.          Last Recorded Vitals  /63   Pulse 53   Temp 36.6 °C (97.9 °F) (Temporal)   Resp 20   Wt 99.8 kg (220 lb 0.3 oz)   SpO2 95%     Relevant Results        ECG 12 lead    Result Date: 2/1/2024  Sinus bradycardia Left axis deviation Low voltage QRS Possible Anterolateral infarct , age undetermined Abnormal ECG When compared with ECG of 19-JUL-2023 14:12, Previous ECG has undetermined rhythm, needs review Left bundle branch block is no longer  Present Borderline criteria for Anterior infarct are now Present Borderline criteria for Anterolateral infarct are now Present See ED provider note for full interpretation and clinical correlation Confirmed by Wanda Juan ([id number]) on 2/1/2024 10:32:50 AM    CT chest wo IV contrast    Result Date: 2/1/2024  Interpreted By:  Beulah Chacon, STUDY: CT CHEST WO IV CONTRAST;  2/1/2024 6:40 am   INDICATION: Signs/Symptoms:sob   COMPARISON: Chest x-ray 02/01/2024. CT chest 05/04/2021   ACCESSION NUMBER(S): [id number]   ORDERING CLINICIAN: BHARGAV SWEET   TECHNIQUE: Axial CT images were obtained through the chest with no intravenous contrast administration.  Coronal and sagittal reformats were performed.   FINDINGS: There is motion artifact.   HEART AND VESSELS: No thoracic aortic aneurysm. Atherosclerotic calcifications are noted at the thoracic aorta. The main pulmonary artery is dilated. The heart is enlarged.   There is a small pericardial effusion. Status post left atrial appendage occlusion device placement.   MEDIASTINUM AND TAMMIE, LOWER NECK AND AXILLA: There is a 1.1 cm peripherally calcified nodule at the left lobe of the thyroid gland.   No obvious pathologically enlarged lymph nodes on unenhanced CT.   LUNGS AND AIRWAYS: The trachea and central airways are patent.   The evaluation of the lungs is degraded by motion artifact. There is a large right pleural effusion with complete collapse of the right lower lobe. Atelectasis/consolidation at right upper lobe and right middle lobe. Tree-in-bud airspace opacities at the right upper lobe and left lower lobe. There is mild atelectasis at the left lower lobe. No left pleural effusion. No pneumothorax.   UPPER ABDOMEN: Motion artifact. No obvious acute findings.   CHEST WALL AND OSSEOUS STRUCTURES: There is soft tissue edema at the chest wall and visualized abdominal wall. The evaluation for acute fracture is degraded by motion artifact. Multilevel  degenerative changes of the spine.       1. Study degraded by motion artifact. Large right pleural effusion with complete collapse of the right lower lobe. Atelectasis/consolidation at the right upper lobe and right middle lobe. Follow-up CT after treatment recommended to ensure complete resolution and exclude underlying mass. 2. Tree-in-bud airspace opacities at the right upper lobe and left lower lobe, may be seen with acute infection/inflammation of the smaller airways such as bronchiolitis or bronchopneumonia. 3. Mild atelectasis at the left lower lobe. 4. Cardiomegaly. Small pericardial effusion. Dilated main pulmonary artery, please correlate for pulmonary arterial hypertension. 5. 1.1 cm peripherally calcified nodule at the left lobe of the thyroid gland. This can be further characterized with nonemergent thyroid ultrasound. 6. Soft tissue edema at the chest wall and visualized abdominal wall.         MACRO:   Critical Finding:  See findings. Notification was initiated on 2/1/2024 at 6:50 am by  Beulah Chacon.  (**-YCF-**) Instructions:   Signed by: Beulah Chacon 2/1/2024 6:56 AM Dictation workstation:   [id number]    XR chest 1 view    Result Date: 2/1/2024  Interpreted By:  Lesley Dooley, STUDY: XR CHEST 1 VIEW;  2/1/2024 3:03 am   INDICATION: Signs/Symptoms:sob.   COMPARISON: 07/19/2023   ACCESSION NUMBER(S): [id number]   ORDERING CLINICIAN: BHARGAV SWEET   FINDINGS:     CARDIOMEDIASTINAL SILHOUETTE: Stable cardiomegaly. Vague density overlying the upper cardiac silhouette may represent a left atrial appendage closure device.   LUNGS: Opacity involving the mid to lower right thorax, likely combination of large right pleural effusion and atelectasis. No pneumothorax.   ABDOMEN: No remarkable upper abdominal findings.   BONES: No acute osseous abnormality.       New large right pleural effusion and basilar atelectasis. Underlying pneumonia is not excluded in the appropriate clinical setting.    MACRO: None   Signed by: Lesley Dooley 2/1/2024 3:19 AM Dictation workstation:   [id number]        Assessment/Plan   Active Problems:  There are no active Hospital Problems.    Daphne Morris is a 68 y.o. female presenting with past medical history of heart failure, atrial fibrillation, Hypertension, type 2 DM transfer from [facility] due to acute hypoxic respiratory failure secondary to pneumonia.       Acute Medical Issues  # Acute Hypoxic Hypercapnic Respiratory Failure  # CAP  # COPD Exacerbation   # Acute Decompensated Heart Failure  # Large Right Pleural Effusion  - Shortness of breath and bilateral leg swelling    Plan  - Start Lasix 40 twice daily  - Start Ceftriaxone and Azithromycin  - Start Solumedrol  40 daily as patient is allergic to prednisone and continue with duoneb every 6 hours and as needed  - Pulmonology and Cardiology consulted, pending rec  - Monitor urine input and output  -Labs pending pro calcitonin, strep and urine legionella  - on bipap wean as tolerated        # Acute Kidney Injury   - Pre renal in nature in the setting of acute decompensated Heart Failure  - Diurese as tolerated  - Consider Nephrology consult if worsening kidney function      #General Weakness  - Likely due to deconditioning  - PT/OT consulted    Chronic Medical Issues  #Atrial Fibrillation: amiodarone and metoprolol  # CAD: continue aspirin and plavis  # Type 2 DM: continue lantus 14 unit at bed time, on ISS and hold jardiance  # Gerd: on PPI  # HLD: continue atorvastatin  #Depression: continue venlafaxine    Access: piv  Diet: Diabetic   GI Prophylaxis: PPI  DVT: Heparin  Antibiotics: Ceftriaxone and Azithromycin  Code: Full   Disposition: Patient admitted for acute hypoxic respiratory failure 2/2 Pneumonia and heart failure > 2 days    Trevor Moralez MD

## 2024-02-02 NOTE — PROGRESS NOTES
02/02/24 1434   Discharge Planning   Living Arrangements Alone   Support Systems Friends/neighbors   Assistance Needed patient is alert and oriented x3, independent in ADL's but reports she struggles to get off the toilet sometimes even though she has a raised toilet seat, reports using a rollator at all times, o2 at 3L nocturnally only   Type of Residence Private residence   Number of Stairs to Enter Residence 1   Number of Stairs Within Residence 4   Do you have animals or pets at home? Yes   Type of Animals or Pets 1 cat inside and 1 cat outside   Who is requesting discharge planning? Provider   Home or Post Acute Services Post acute facilities (Rehab/SNF/etc);In home services   Type of Post Acute Facility Services Skilled nursing   Type of Home Care Services Home OT;Home PT;Home nursing visits   Patient expects to be discharged to: C vs SNF   Does the patient need discharge transport arranged? Yes   RoundTrip coordination needed? Yes   Has discharge transport been arranged? No     2/2/2024 1439: Spoke to patient at bedside in regards to discharge planning- informed patient PT/OT evaluations will be completed and help us guide what services patient would qualify for at time of discharge.

## 2024-02-02 NOTE — H&P (VIEW-ONLY)
Inpatient consult to Cardiology  Consult performed by: CARLENE Villegas-CNP  Consult ordered by: Josette Damian MD  Reason for consult: CHF          History Of Present Illness  Daphne Morris is a 68 y.o. female presenting with complaints of shortness of breath progressively worsening to where she could not ambulate in her house. She also reports leg swelling, orthopnea, and bilat leg pain with a left knee wound.     Lab work in the ER showed glucose 220, potassium 5.4, BUN/Cr 49/3.25, lactate 0.5, troponin 18, BNP 1786, WBC 6.7, H&H 9.5/32.8, VBGs showed ph-7.24, PCO2-69, PO2-57, HCO3 29.6. CXR showed a new large right pleural effusion. CT of the chest showed a large right pleural effusions with complete collapse of the right lower lung, pneumonia, a small pericardial effusion, and soft tissue edema at the chest and abd wall.     EKG showed SB, LBBB.    She was started on Bipap and given Lasix IV and admitted to the ICU.     She was scheduled for an outpt left and right heart cath on Jan 17th but was unable to lay flat.    Past Medical History  Past Medical History:   Diagnosis Date    Acute CHF (CMS/[facility])     04/2023    Arthritis     Atrial fibrillation (CMS/[facility])     Chronic diastolic CHF (congestive heart failure) (CMS/[facility])     COPD (chronic obstructive pulmonary disease) (CMS/[facility])     CVA (cerebral vascular accident) (CMS/[facility])     2021- righ sided weakness    Depression     Diabetes (CMS/[facility])     Essential tremor     HTN (hypertension)     Hyperlipidemia     Hypothyroidism     s/p radioactive iodine treatment    Impacted cerumen, left ear     Impacted cerumen of left ear    Left ventricular ejection fraction greater than 40%     40-45%    Noncompliance     Personal history of other diseases of the respiratory system     History of acute bronchitis    Renal carcinoma, right (CMS/[facility])     s/p partial nephrectomy 8/2021    Vitamin D deficiency    HTN, HLD, HFpEF, pericardial effusion, peripheral vascular  disease, PAF, T2DM, CVA, s/p ARIS closure device, COPD, CKD stage 3b (s/p right nephrectomy 2/2 malignancy), iron deficiency anemia, hypothyroidism, GERD, depression     Surgical History  Past Surgical History:   Procedure Laterality Date    ANKLE SURGERY      2013    CATARACT EXTRACTION Right     2019     SECTION, CLASSIC      MR CHEST ANGIO W AND WO IV CONTRAST  2023    MR CHEST ANGIO W AND WO IV CONTRAST 2023 [facility]    MR HEAD ANGIO WO IV CONTRAST  2021    MR HEAD ANGIO WO IV CONTRAST [facility]    NEPHRECTOMY  2021    SHOULDER SURGERY              Social History  She reports that she has been smoking cigarettes. She started smoking about 55 years ago. She has a 25.00 pack-year smoking history. She has never been exposed to tobacco smoke. She has never used smokeless tobacco. She reports that she does not currently use alcohol. She reports current drug use. Drug: Marijuana.    Family History  Family History   Problem Relation Name Age of Onset    Hypertension Mother      Diabetes Father      Heart disease Father      Cancer Sister      Cancer Brother      Diabetes Daughter      Asthma Daughter      Heart attack Daughter      Diabetes Maternal Grandfather      Heart disease Paternal Grandmother          Allergies  Adhesive tape-silicones, Amoxicillin, Bee venom protein (honey bee), Codeine, Doxycycline, Latex, Lisinopril, Prednisone, Citalopram, Oseltamivir, and Phenyleph-shark oil-glyc-pet    Review of Systems  Review of Systems   Constitutional:  Positive for fatigue. Negative for chills and fever.   Respiratory:  Positive for shortness of breath.    Cardiovascular:  Positive for palpitations and leg swelling. Negative for chest pain.   Gastrointestinal:  Positive for abdominal distention. Negative for abdominal pain.   Musculoskeletal:  Positive for gait problem.   Neurological:  Positive for weakness. Negative for dizziness.   All other systems reviewed and  "are negative.         Physical Exam  Constitutional: Obese, awake/alert/oriented x3, no distress, alert and cooperative  Eyes: PERRL, EOMI, clear sclera  ENMT: mucous membranes moist, no apparent injury, no lesions seen  Head/Neck: Neck supple, no apparent injury, thyroid without mass or tenderness, trachea midline, no bruits  Respiratory/Thorax: Patent airways, CTAB, normal breath sounds with good chest expansion, thorax symmetric  Cardiovascular: Regular, rate and rhythm, no murmurs, 2+ equal pulses of the extremities, normal S 1and S 2  Gastrointestinal: Edema to abd, soft, no rebound tenderness or guarding, no masses palpable, no organomegaly, +BS, no bruits  Musculoskeletal: ROM intact, no joint swelling, normal strength  Extremities: BLE edema from feet to abd  Neurological: alert and oriented x3, intact senses, motor, response and reflexes, normal strength  Lymphatic: No significant lymphadenopathy  Psychological: Appropriate mood and behavior  Skin: Warm and dry, dressing to left knee       Last Recorded Vitals  Blood pressure 129/59, pulse 50, temperature 36.6 °C (97.9 °F), temperature source Temporal, resp. rate 24, height 1.676 m (5' 6\"), weight 99.8 kg (220 lb 0.3 oz), SpO2 98 %.    Relevant Results    Scheduled medications  amiodarone, 200 mg, oral, Daily with breakfast  amLODIPine, 5 mg, oral, Daily  aspirin, 81 mg, oral, Daily  atorvastatin, 40 mg, oral, Daily  azithromycin, 500 mg, oral, q24h SUMIT  cefTRIAXone, 1 g, intravenous, q24h  clopidogrel, 75 mg, oral, Daily  ferrous gluconate, 324 mg, oral, Daily with breakfast  furosemide, 40 mg, intravenous, q12h  heparin (porcine), 5,000 Units, subcutaneous, q8h  insulin glargine, 14 Units, subcutaneous, Nightly  insulin lispro, 0-15 Units, subcutaneous, TID with meals  ipratropium-albuteroL, 3 mL, nebulization, TID  levothyroxine, 200 mcg, oral, Daily before breakfast  levothyroxine, 50 mcg, oral, Daily before breakfast  methylPREDNISolone sodium " succinate (PF), 40 mg, intravenous, Daily  metoprolol succinate XL, 50 mg, oral, BID  pantoprazole, 40 mg, oral, BID  [Held by provider] potassium chloride CR, 20 mEq, oral, Daily  [Held by provider] SITagliptin phosphate, 25 mg, oral, Daily  venlafaxine XR, 75 mg, oral, Daily      Continuous medications     PRN medications  PRN medications: dextrose 10 % in water (D10W), dextrose, glucagon, ipratropium-albuteroL, oxygen, oxygen, oxygen    Results for orders placed or performed during the hospital encounter of 02/02/24 (from the past 24 hour(s))   Blood Gas Venous Full Panel   Result Value Ref Range    POCT pH, Venous 7.20 (LL) 7.33 - 7.43 pH    POCT pCO2, Venous 74 (HH) 41 - 51 mm Hg    POCT pO2, Venous 44 35 - 45 mm Hg    POCT SO2, Venous 76 (H) 45 - 75 %    POCT Oxy Hemoglobin, Venous 73.4 45.0 - 75.0 %    POCT Hematocrit Calculated, Venous 31.0 (L) 36.0 - 46.0 %    POCT Sodium, Venous 138 136 - 145 mmol/L    POCT Potassium, Venous 5.0 3.5 - 5.3 mmol/L    POCT Chloride, Venous 108 (H) 98 - 107 mmol/L    POCT Ionized Calicum, Venous 1.06 (L) 1.10 - 1.33 mmol/L    POCT Glucose, Venous 185 (H) 74 - 99 mg/dL    POCT Lactate, Venous 0.5 0.4 - 2.0 mmol/L    POCT Base Excess, Venous -0.3 -2.0 - 3.0 mmol/L    POCT HCO3 Calculated, Venous 28.9 (H) 22.0 - 26.0 mmol/L    POCT Hemoglobin, Venous 10.2 (L) 12.0 - 16.0 g/dL    POCT Anion Gap, Venous 6.0 (L) 10.0 - 25.0 mmol/L    Patient Temperature      FiO2 28 %   CBC   Result Value Ref Range    WBC 5.9 4.4 - 11.3 x10*3/uL    nRBC 0.0 0.0 - 0.0 /100 WBCs    RBC 3.12 (L) 4.00 - 5.20 x10*6/uL    Hemoglobin 9.4 (L) 12.0 - 16.0 g/dL    Hematocrit 32.6 (L) 36.0 - 46.0 %     (H) 80 - 100 fL    MCH 30.1 26.0 - 34.0 pg    MCHC 28.8 (L) 32.0 - 36.0 g/dL    RDW 14.9 (H) 11.5 - 14.5 %    Platelets 278 150 - 450 x10*3/uL   Renal Function Panel   Result Value Ref Range    Glucose 183 (H) 74 - 99 mg/dL    Sodium 142 136 - 145 mmol/L    Potassium 4.9 3.5 - 5.3 mmol/L    Chloride 106  98 - 107 mmol/L    Bicarbonate 31 21 - 32 mmol/L    Anion Gap 10 10 - 20 mmol/L    Urea Nitrogen 47 (H) 6 - 23 mg/dL    Creatinine 3.21 (H) 0.50 - 1.05 mg/dL    eGFR 15 (L) >60 mL/min/1.73m*2    Calcium 7.5 (L) 8.6 - 10.3 mg/dL    Phosphorus 6.0 (H) 2.5 - 4.9 mg/dL    Albumin 2.4 (L) 3.4 - 5.0 g/dL   Magnesium   Result Value Ref Range    Magnesium 2.01 1.60 - 2.40 mg/dL   Lactate Dehydrogenase   Result Value Ref Range     (H) 84 - 246 U/L   Protein, Total   Result Value Ref Range    Total Protein 5.7 (L) 6.4 - 8.2 g/dL   Blood Gas Arterial   Result Value Ref Range    POCT pH, Arterial 7.25 (LL) 7.38 - 7.42 pH    POCT pCO2, Arterial 71 (HH) 38 - 42 mm Hg    POCT pO2, Arterial 86 85 - 95 mm Hg    POCT SO2, Arterial 98 94 - 100 %    POCT Oxy Hemoglobin, Arterial 96.4 94.0 - 98.0 %    POCT Base Excess, Arterial 1.8 -2.0 - 3.0 mmol/L    POCT HCO3 Calculated, Arterial 31.1 (H) 22.0 - 26.0 mmol/L    Patient Temperature      FiO2 40 %    Site of Arterial Puncture Radial Right     Jonathan's Test Positive    POCT GLUCOSE   Result Value Ref Range    POCT Glucose 117 (H) 74 - 99 mg/dL   BLOOD GAS VENOUS   Result Value Ref Range    POCT pH, Venous 7.22 (LL) 7.33 - 7.43 pH    POCT pCO2, Venous 78 (HH) 41 - 51 mm Hg    POCT pO2, Venous 34 (L) 35 - 45 mm Hg    POCT SO2, Venous 56 45 - 75 %    POCT Oxy Hemoglobin, Venous 55.7 45.0 - 75.0 %    POCT Base Excess, Venous 1.8 -2.0 - 3.0 mmol/L    POCT HCO3 Calculated, Venous 31.9 (H) 22.0 - 26.0 mmol/L    Patient Temperature      FiO2 28 %       US renal complete   Final Result   No left hydronephrosis. Right total nephrectomy.        MACRO:   None        Signed by: Amando Starks 2/2/2024 2:36 PM   Dictation workstation:   [id number]      XR knee left 4+ views    (Results Pending)   XR chest 1 view    (Results Pending)          Assessment/Plan   Echocardiogram from August 2023 (post watchman) reviewed: LVEF 55%, small to moderate pericardial effusions    1. Acute on chronic  hypoxic/hypercapnic respiratory failure 2/2 acute on chronic diastolic CHF and pneumonia, COPD exac, Pleural effusion  -CXR showed large right pleural effusion  -CTA of the chest showed large right pleural effusion with complete collapse of the right lower lung, pneumonia, a small pericardial effusion, and soft tissue edema at the chest and abd wall.   -antibiotics  -bronchodilators  -Steroids  -oxygen support/Bipap  -sputum culture  -blood cultures  -Significant leg to abd swelling and shortness of breath  -BNP 1786  -Strict I & Os  -Daily weights  -2gm na diet  -Repeat echo  -Unable to start SGLT2 inhibitors with current GFR  -Cont Lasix IV->if no good response, may need lasix gtt  -Pulmonary following, planning for thoracentesis  -Had a left and right heart cath planned for Jan 17th but unable to lay flat->consider as inpatient once respiratory status improved  -Monitor on tele    2. Elevated troponins-non MI elevation  -Denies chest pain  -Does have shortness of breath-worsening for weeks  -Had a LHC planned 2 weeks ago->unable to lay flat  -Cont asa, plavix, statin, metoprolol  -Possible LHC next week if respiratory status improved  -Monitor on tele    3. Paroxysmal atrial fibrillation with RVR  -s/p Watchman in Aug 2023  -Currently SB  -Cont amiodarone and metoprolol for rate control  -Monitor on tele    4. Hypertension  -stable  -2gm na diet  -cont meds  -monitor    5. Anemia  -Appear stable    6. Acute on Chronic kidney disease  -s/p nephrectomy  -Renal consulted, note reviewed  -Renal US negative  -Monitor closely on Lasix IV    7. Diabetes  -ISS  -Accuchecks  -Check hgb A1C    8. Hypothyroidism  -Cont synthroid    9. Pericardial effusion  -No signs of tamponade  -Check echo    Critical Care time 60 minutes    CARLENE Villegas-CNP

## 2024-02-02 NOTE — SIGNIFICANT EVENT
Daphne Morris is a 68 y.o. female presenting with past medical history of heart failure, atrial fibrillation, Hypertension, type 2 DM transfer from [facility] due to acute hypoxic respiratory failure secondary to pneumonia, COPD exacerbation and acute diastolic heart failure.    Acute hypoxic respiratory failure secondary to pneumonia, COPD exacerbation and acute diastolic heart failure.  Echocardiogram 8/2023 revealed LVEF of 55% with mildly dilated left atrium.  Continue Lasix 40 mg twice daily  Continue ceftriaxone and azithromycin  Continue Solu-Medrol 40 mg daily  Continue nebulizers  Cardiology and pulmonology following  Continue monitor intake and output, net input of 50 cc since admission     Acute Kidney Injury on stage III chronic kidney disease  Pre renal in nature in the setting of acute decompensated Heart Failure  Renal ultrasound pending  Diurese as tolerated  Considering  Nephrology consult if worsening kidney function    Left knee pain  Patient fell and was seen in the emergency room about a week ago  Diagnosed with skin tear  There is swelling pain with movement  Follow-up with x-ray    Paroxysmal atrial fibrillation  Currently in sinus rhythm  Continue amiodarone and metoprolol    Coronary artery disease  Continue, aspirin, Plavix, metoprolol    Type 2 diabetes mellitus  Continue Lantus and insulin sliding scale  Holding Jardiance  Continue diabetic diet  Follow Accu-Cheks    GERD  Continue PPI    Hyperlipidemia  Continue atorvastatin    Depression  Continue venlafaxine    DVT prophylaxis  Heparin

## 2024-02-02 NOTE — NURSING NOTE
Rounded to assess patient, RN Adina, just finished dressing wounds, multiple blisters and wound with reddish bed.  BLE red, toenails lengthy.  Request for podiatrist consult message sent to hospitalist.  Moist wound healing recommended pending podiatrist consult, xeroform,  and cover dressing.  Message if needed.

## 2024-02-03 LAB
ALBUMIN SERPL BCP-MCNC: 2 G/DL (ref 3.4–5)
ANION GAP SERPL CALC-SCNC: 9 MMOL/L (ref 10–20)
ARTERIAL PATENCY WRIST A: POSITIVE
BASE EXCESS BLDA CALC-SCNC: 2.7 MMOL/L (ref -2–3)
BASE EXCESS BLDV CALC-SCNC: 4.2 MMOL/L (ref -2–3)
BODY TEMPERATURE: ABNORMAL
BODY TEMPERATURE: ABNORMAL
BUN SERPL-MCNC: 50 MG/DL (ref 6–23)
CALCIUM SERPL-MCNC: 7.2 MG/DL (ref 8.6–10.3)
CHLORIDE SERPL-SCNC: 101 MMOL/L (ref 98–107)
CO2 SERPL-SCNC: 30 MMOL/L (ref 21–32)
CREAT SERPL-MCNC: 3.28 MG/DL (ref 0.5–1.05)
EGFRCR SERPLBLD CKD-EPI 2021: 15 ML/MIN/1.73M*2
ERYTHROCYTE [DISTWIDTH] IN BLOOD BY AUTOMATED COUNT: 14.5 % (ref 11.5–14.5)
GLUCOSE BLD MANUAL STRIP-MCNC: 166 MG/DL (ref 74–99)
GLUCOSE BLD MANUAL STRIP-MCNC: 169 MG/DL (ref 74–99)
GLUCOSE BLD MANUAL STRIP-MCNC: 210 MG/DL (ref 74–99)
GLUCOSE BLD MANUAL STRIP-MCNC: 247 MG/DL (ref 74–99)
GLUCOSE FLD-MCNC: 153 MG/DL
GLUCOSE SERPL-MCNC: 190 MG/DL (ref 74–99)
HCO3 BLDA-SCNC: 28.9 MMOL/L (ref 22–26)
HCO3 BLDV-SCNC: 31.1 MMOL/L (ref 22–26)
HCT VFR BLD AUTO: 27.8 % (ref 36–46)
HGB BLD-MCNC: 8.3 G/DL (ref 12–16)
INHALED O2 CONCENTRATION: 21 %
INHALED O2 CONCENTRATION: 40 %
LDH FLD L TO P-CCNC: 74 U/L
LEGIONELLA AG UR QL: NEGATIVE
MCH RBC QN AUTO: 29.6 PG (ref 26–34)
MCHC RBC AUTO-ENTMCNC: 29.9 G/DL (ref 32–36)
MCV RBC AUTO: 99 FL (ref 80–100)
NRBC BLD-RTO: 0 /100 WBCS (ref 0–0)
OXYHGB MFR BLDA: 88 % (ref 94–98)
OXYHGB MFR BLDV: 64.3 % (ref 45–75)
PCO2 BLDA: 50 MM HG (ref 38–42)
PCO2 BLDV: 55 MM HG (ref 41–51)
PH BLDA: 7.37 PH (ref 7.38–7.42)
PH BLDV: 7.36 PH (ref 7.33–7.43)
PH FLD: 7.89 [PH]
PHOSPHATE SERPL-MCNC: 5.8 MG/DL (ref 2.5–4.9)
PLATELET # BLD AUTO: 270 X10*3/UL (ref 150–450)
PO2 BLDA: 57 MM HG (ref 85–95)
PO2 BLDV: 38 MM HG (ref 35–45)
POTASSIUM SERPL-SCNC: 5 MMOL/L (ref 3.5–5.3)
PROCALCITONIN SERPL-MCNC: 0.08 NG/ML
PROT FLD-MCNC: 1.3 G/DL
RBC # BLD AUTO: 2.8 X10*6/UL (ref 4–5.2)
S PNEUM AG UR QL: NEGATIVE
SAO2 % BLDA: 89 % (ref 94–100)
SAO2 % BLDV: 65 % (ref 45–75)
SODIUM SERPL-SCNC: 135 MMOL/L (ref 136–145)
SPECIMEN DRAWN FROM PATIENT: ABNORMAL
WBC # BLD AUTO: 4.1 X10*3/UL (ref 4.4–11.3)

## 2024-02-03 PROCEDURE — 2500000004 HC RX 250 GENERAL PHARMACY W/ HCPCS (ALT 636 FOR OP/ED): Mod: MUE

## 2024-02-03 PROCEDURE — 36415 COLL VENOUS BLD VENIPUNCTURE: CPT

## 2024-02-03 PROCEDURE — 82805 BLOOD GASES W/O2 SATURATION: CPT

## 2024-02-03 PROCEDURE — 2500000004 HC RX 250 GENERAL PHARMACY W/ HCPCS (ALT 636 FOR OP/ED)

## 2024-02-03 PROCEDURE — 82947 ASSAY GLUCOSE BLOOD QUANT: CPT

## 2024-02-03 PROCEDURE — 99233 SBSQ HOSP IP/OBS HIGH 50: CPT | Performed by: INTERNAL MEDICINE

## 2024-02-03 PROCEDURE — 2500000002 HC RX 250 W HCPCS SELF ADMINISTERED DRUGS (ALT 637 FOR MEDICARE OP, ALT 636 FOR OP/ED): Performed by: INTERNAL MEDICINE

## 2024-02-03 PROCEDURE — 80069 RENAL FUNCTION PANEL: CPT | Performed by: FAMILY MEDICINE

## 2024-02-03 PROCEDURE — 97165 OT EVAL LOW COMPLEX 30 MIN: CPT | Mod: GO

## 2024-02-03 PROCEDURE — 99233 SBSQ HOSP IP/OBS HIGH 50: CPT | Performed by: FAMILY MEDICINE

## 2024-02-03 PROCEDURE — 94660 CPAP INITIATION&MGMT: CPT

## 2024-02-03 PROCEDURE — 2500000005 HC RX 250 GENERAL PHARMACY W/O HCPCS: Performed by: FAMILY MEDICINE

## 2024-02-03 PROCEDURE — 94640 AIRWAY INHALATION TREATMENT: CPT | Mod: MUE

## 2024-02-03 PROCEDURE — 2500000002 HC RX 250 W HCPCS SELF ADMINISTERED DRUGS (ALT 637 FOR MEDICARE OP, ALT 636 FOR OP/ED): Mod: MUE

## 2024-02-03 PROCEDURE — 82805 BLOOD GASES W/O2 SATURATION: CPT | Performed by: INTERNAL MEDICINE

## 2024-02-03 PROCEDURE — 2500000001 HC RX 250 WO HCPCS SELF ADMINISTERED DRUGS (ALT 637 FOR MEDICARE OP)

## 2024-02-03 PROCEDURE — 2060000001 HC INTERMEDIATE ICU ROOM DAILY

## 2024-02-03 PROCEDURE — 85027 COMPLETE CBC AUTOMATED: CPT | Performed by: FAMILY MEDICINE

## 2024-02-03 PROCEDURE — 36600 WITHDRAWAL OF ARTERIAL BLOOD: CPT

## 2024-02-03 PROCEDURE — 36415 COLL VENOUS BLD VENIPUNCTURE: CPT | Performed by: FAMILY MEDICINE

## 2024-02-03 RX ORDER — ACETAMINOPHEN 325 MG/1
650 TABLET ORAL EVERY 6 HOURS PRN
Status: DISCONTINUED | OUTPATIENT
Start: 2024-02-03 | End: 2024-02-15 | Stop reason: HOSPADM

## 2024-02-03 RX ORDER — HYDROMORPHONE HYDROCHLORIDE 1 MG/ML
0.4 INJECTION, SOLUTION INTRAMUSCULAR; INTRAVENOUS; SUBCUTANEOUS EVERY 4 HOURS PRN
Status: DISCONTINUED | OUTPATIENT
Start: 2024-02-03 | End: 2024-02-15 | Stop reason: HOSPADM

## 2024-02-03 RX ADMIN — METOPROLOL SUCCINATE 50 MG: 50 TABLET, EXTENDED RELEASE ORAL at 20:56

## 2024-02-03 RX ADMIN — PANTOPRAZOLE SODIUM 40 MG: 40 TABLET, DELAYED RELEASE ORAL at 09:31

## 2024-02-03 RX ADMIN — HEPARIN SODIUM 5000 UNITS: 5000 INJECTION INTRAVENOUS; SUBCUTANEOUS at 05:22

## 2024-02-03 RX ADMIN — INSULIN LISPRO 3 UNITS: 100 INJECTION, SOLUTION INTRAVENOUS; SUBCUTANEOUS at 12:40

## 2024-02-03 RX ADMIN — CLOPIDOGREL 75 MG: 75 TABLET ORAL at 09:31

## 2024-02-03 RX ADMIN — IPRATROPIUM BROMIDE AND ALBUTEROL SULFATE 3 ML: 2.5; .5 SOLUTION RESPIRATORY (INHALATION) at 20:15

## 2024-02-03 RX ADMIN — INSULIN GLARGINE 14 UNITS: 100 INJECTION, SOLUTION SUBCUTANEOUS at 20:57

## 2024-02-03 RX ADMIN — IPRATROPIUM BROMIDE AND ALBUTEROL SULFATE 3 ML: 2.5; .5 SOLUTION RESPIRATORY (INHALATION) at 13:27

## 2024-02-03 RX ADMIN — INSULIN LISPRO 3 UNITS: 100 INJECTION, SOLUTION INTRAVENOUS; SUBCUTANEOUS at 08:33

## 2024-02-03 RX ADMIN — LEVOTHYROXINE SODIUM 50 MCG: 50 TABLET ORAL at 07:50

## 2024-02-03 RX ADMIN — AMLODIPINE BESYLATE 5 MG: 5 TABLET ORAL at 09:31

## 2024-02-03 RX ADMIN — HEPARIN SODIUM 5000 UNITS: 5000 INJECTION INTRAVENOUS; SUBCUTANEOUS at 12:41

## 2024-02-03 RX ADMIN — PANTOPRAZOLE SODIUM 40 MG: 40 TABLET, DELAYED RELEASE ORAL at 20:56

## 2024-02-03 RX ADMIN — Medication 40 PERCENT: at 04:21

## 2024-02-03 RX ADMIN — METOPROLOL SUCCINATE 50 MG: 50 TABLET, EXTENDED RELEASE ORAL at 09:31

## 2024-02-03 RX ADMIN — IPRATROPIUM BROMIDE AND ALBUTEROL SULFATE 3 ML: 2.5; .5 SOLUTION RESPIRATORY (INHALATION) at 08:19

## 2024-02-03 RX ADMIN — INSULIN LISPRO 6 UNITS: 100 INJECTION, SOLUTION INTRAVENOUS; SUBCUTANEOUS at 16:48

## 2024-02-03 RX ADMIN — Medication 2 L/MIN: at 13:35

## 2024-02-03 RX ADMIN — AMIODARONE HYDROCHLORIDE 200 MG: 200 TABLET ORAL at 08:40

## 2024-02-03 RX ADMIN — FUROSEMIDE 40 MG: 10 INJECTION, SOLUTION INTRAMUSCULAR; INTRAVENOUS at 08:40

## 2024-02-03 RX ADMIN — FUROSEMIDE 40 MG: 10 INJECTION, SOLUTION INTRAMUSCULAR; INTRAVENOUS at 20:56

## 2024-02-03 RX ADMIN — AZITHROMYCIN DIHYDRATE 500 MG: 500 TABLET ORAL at 09:33

## 2024-02-03 RX ADMIN — ACETAMINOPHEN 650 MG: 325 TABLET ORAL at 17:05

## 2024-02-03 RX ADMIN — ATORVASTATIN CALCIUM 40 MG: 40 TABLET, FILM COATED ORAL at 09:31

## 2024-02-03 RX ADMIN — LEVOTHYROXINE SODIUM 200 MCG: 100 TABLET ORAL at 07:49

## 2024-02-03 RX ADMIN — METHYLPREDNISOLONE SODIUM SUCCINATE 40 MG: 40 INJECTION, POWDER, FOR SOLUTION INTRAMUSCULAR; INTRAVENOUS at 09:31

## 2024-02-03 RX ADMIN — CEFTRIAXONE SODIUM 1 G: 1 INJECTION, SOLUTION INTRAVENOUS at 05:22

## 2024-02-03 RX ADMIN — ASPIRIN 81 MG: 81 TABLET, COATED ORAL at 09:31

## 2024-02-03 RX ADMIN — Medication 21 PERCENT: at 08:19

## 2024-02-03 RX ADMIN — VENLAFAXINE HYDROCHLORIDE 75 MG: 75 CAPSULE, EXTENDED RELEASE ORAL at 09:31

## 2024-02-03 RX ADMIN — HEPARIN SODIUM 5000 UNITS: 5000 INJECTION INTRAVENOUS; SUBCUTANEOUS at 20:56

## 2024-02-03 RX ADMIN — FERROUS GLUCONATE 324 MG: 324 TABLET ORAL at 08:41

## 2024-02-03 ASSESSMENT — COGNITIVE AND FUNCTIONAL STATUS - GENERAL
HELP NEEDED FOR BATHING: A LITTLE
PERSONAL GROOMING: A LITTLE
MOVING FROM LYING ON BACK TO SITTING ON SIDE OF FLAT BED WITH BEDRAILS: A LITTLE
STANDING UP FROM CHAIR USING ARMS: A LOT
DAILY ACTIVITIY SCORE: 14
MOBILITY SCORE: 14
DRESSING REGULAR UPPER BODY CLOTHING: A LITTLE
TOILETING: TOTAL
WALKING IN HOSPITAL ROOM: A LOT
TURNING FROM BACK TO SIDE WHILE IN FLAT BAD: A LITTLE
PERSONAL GROOMING: A LITTLE
DRESSING REGULAR UPPER BODY CLOTHING: A LITTLE
DRESSING REGULAR LOWER BODY CLOTHING: A LOT
MOVING TO AND FROM BED TO CHAIR: A LOT
DRESSING REGULAR LOWER BODY CLOTHING: TOTAL
CLIMB 3 TO 5 STEPS WITH RAILING: A LOT
DAILY ACTIVITIY SCORE: 17
TOILETING: A LOT
HELP NEEDED FOR BATHING: A LOT

## 2024-02-03 ASSESSMENT — PAIN SCALES - PAIN ASSESSMENT IN ADVANCED DEMENTIA (PAINAD)
BREATHING: NORMAL
FACIALEXPRESSION: SMILING OR INEXPRESSIVE
TOTALSCORE: 0
CONSOLABILITY: NO NEED TO CONSOLE
BODYLANGUAGE: RELAXED

## 2024-02-03 ASSESSMENT — PAIN SCALES - GENERAL
PAINLEVEL_OUTOF10: 2
PAINLEVEL_OUTOF10: 0 - NO PAIN
PAINLEVEL_OUTOF10: 5 - MODERATE PAIN

## 2024-02-03 ASSESSMENT — PAIN SCALES - WONG BAKER
WONGBAKER_NUMERICALRESPONSE: HURTS EVEN MORE
WONGBAKER_NUMERICALRESPONSE: HURTS LITTLE BIT

## 2024-02-03 ASSESSMENT — PAIN - FUNCTIONAL ASSESSMENT
PAIN_FUNCTIONAL_ASSESSMENT: 0-10

## 2024-02-03 ASSESSMENT — PAIN DESCRIPTION - LOCATION: LOCATION: FOOT

## 2024-02-03 ASSESSMENT — PAIN DESCRIPTION - ORIENTATION: ORIENTATION: RIGHT;LEFT

## 2024-02-03 ASSESSMENT — ACTIVITIES OF DAILY LIVING (ADL): BATHING_ASSISTANCE: MAXIMAL

## 2024-02-03 NOTE — PROGRESS NOTES
Daphne Morris is a 68 y.o. female on day 1 of admission presenting with CHF (congestive heart failure), NYHA class I, acute on chronic, combined (CMS/HCC).      Subjective   Patient is improving, BiPAP weaned off       Objective     Last Recorded Vitals  /84   Pulse 56   Temp 36.4 °C (97.5 °F) (Temporal)   Resp 16   Wt 99.8 kg (220 lb 0.3 oz) Comment: done by nightshift RN  SpO2 100%   Intake/Output last 3 Shifts:    Intake/Output Summary (Last 24 hours) at 2/3/2024 0803  Last data filed at 2/3/2024 0600  Gross per 24 hour   Intake 740 ml   Output 1180 ml   Net -440 ml       Admission Weight  Weight: 99.8 kg (220 lb 0.3 oz) (02/02/24 0404)    Daily Weight  02/02/24 : 99.8 kg (220 lb 0.3 oz)    Image Results  XR knee left 4+ views  Narrative: Interpreted By:  Adama Jonas,   STUDY:  Left knee dated  2/2/2024.      INDICATION:  Signs/Symptoms:left kne pain      COMPARISON:  Radiographs dated 10/23/2018.      ACCESSION NUMBER(S):  [id number]      ORDERING CLINICIAN:  JOSUÉ REICH      TECHNIQUE:  Four views of the left knee.      FINDINGS:  No fracture or dislocation is evident.  There is a small knee joint  effusion. There is moderate tricompartmental degenerative change of  the knee. Reticular increased density is seen in the subcutaneous  tissues.      Impression: 1. Small joint effusion and degenerative change of the knee without  osseous injury evident. Knowledge of any trauma may be helpful. If  there is persistent concern for osseous injury, cross-sectional  imaging can be considered.  2. Reticular increased density in the subcutaneous tissues which may  represent lymphedema and/or cellulitis.      MACRO:  None      Signed by: Adama Jonas 2/2/2024 4:24 PM  Dictation workstation:   [id number]  XR chest 1 view  Narrative: Interpreted By:  Rayna Carson,   STUDY:  XR CHEST 1 VIEW;  2/2/2024 2:29 pm      INDICATION:  Signs/Symptoms:S/p thoracentesis.      COMPARISON:  02/01/2024       ACCESSION NUMBER(S):  [id number]      ORDERING CLINICIAN:  AIDAN HERNANDEZ      TECHNIQUE:  Portable AP upright      FINDINGS:  The cardiac silhouette is enlarged but unchanged. Aorta is  atherosclerotic. There is marked improvement in the right pleural  effusion since the prior study as a result of interval thoracentesis.  No pneumothorax is present. There is minimal fluid residual blunting  the costophrenic angle. There is minimal atelectasis at the right  lung base. Pulmonary vasculature appears congested. No interval  change is noted in the osseous structures.      Impression: Marked interval improvement in the right pleural effusion with  interval thoracentesis. No pneumothorax.      Pulmonary vascular congestion      Minimal atelectasis right lung base      MACRO:  None.      Signed by: Rayna Carson 2/2/2024 3:23 PM  Dictation workstation:   [id number]  US renal complete  Narrative: Interpreted By:  Amando Starks,   STUDY:  US RENAL COMPLETE; 2/2/2024 12:23 pm      INDICATION:  Signs/Symptoms:ANDREA on CKD.      COMPARISON:  Renal ultrasound 05/01/2023      ACCESSION NUMBER(S):  [id number]      ORDERING CLINICIAN:  JESSICA EPPS      TECHNIQUE:  Sonography of the kidneys and urinary bladder was performed.      FINDINGS:  Right Kidney: Right total nephrectomy.      Left Kidney:  *Renal length: 10.6 cm  *Parenchyma: Normal parenchymal echogenicity. Normal parenchymal  thickness. *Collecting system: No hydronephrosis.  *Calculus: No echogenic, shadowing calculus.  *Lesion: None.      Bladder: Normal sonographic appearance.      Impression: No left hydronephrosis. Right total nephrectomy.      MACRO:  None      Signed by: Amando Starks 2/2/2024 2:36 PM  Dictation workstation:   [id number]      Physical Exam  Constitutional:       Appearance: Normal appearance.   HENT:      Head: Normocephalic and atraumatic.      Mouth/Throat:      Mouth: Mucous membranes are moist.   Eyes:      Extraocular  Movements: Extraocular movements intact.      Pupils: Pupils are equal, round, and reactive to light.   Cardiovascular:      Rate and Rhythm: Normal rate and regular rhythm.      Pulses: Normal pulses.      Heart sounds: Normal heart sounds.   Pulmonary:      Effort: Pulmonary effort is normal.   Abdominal:      General: Abdomen is flat.      Palpations: Abdomen is soft.   Musculoskeletal:         General: Normal range of motion.      Cervical back: Normal range of motion.   Skin:     General: Skin is warm.      Capillary Refill: Capillary refill takes less than 2 seconds.   Neurological:      Mental Status: She is alert.             Assessment/Plan       Daphne Morris is a 68 y.o. female presenting with past medical history of heart failure, atrial fibrillation, Hypertension, type 2 DM transfer from [facility] due to acute hypoxic respiratory failure secondary to pneumonia, COPD exacerbation and acute diastolic heart failure.     Acute hypoxic respiratory failure secondary to pneumonia, COPD exacerbation, pleural effusion and acute diastolic heart failure.  Echocardiogram 8/2023 revealed LVEF of 55% with mildly dilated left atrium.  S/p thoracentesis with resolution of pleural effusion. 2.3L drained sent for cytology and chemistry   Continue Lasix 40 mg twice daily  Continue ceftriaxone and azithromycin  Continue Solu-Medrol 40 mg daily  Continue nebulizers  Cardiology considering Lasix drip, and also advised there is plans for a left and right heart cath as an outpatient on January 17 but was unable to lay flat, considering to do this while inpatient   and pulmonology following  Continue monitor intake and output, net output of 200cc since admission      Acute Kidney Injury on stage III chronic kidney disease  Pre renal in nature in the setting of acute decompensated Heart Failure  Renal ultrasound reveals right total nephrectomy, with no left-sided hydronephrosis  Diurese as tolerated  Nephrology  following  Following your electrolytes, albumin  Neurology also recommended starting SGLT 2 inhibitor as an outpatient if GFR improves to greater than 25     Elevated troponin/demand ischemia  Continue aspirin, Plavix, statin, metoprolol  Cardiology advised for possible left heart cath next week if her respiratory status improves    Left knee pain  Patient fell and was seen in the emergency room about a week ago  Diagnosed with skin tear  X-ray revealed small joint effusion, degenerative changes/no fracture     Paroxysmal atrial fibrillation  Status post watchman in August 2023  Currently in sinus rhythm  Continue amiodarone and metoprolol     Coronary artery disease  Continue, aspirin, Plavix, metoprolol     Type 2 diabetes mellitus  Continue Lantus and insulin sliding scale  Holding Jardiance  Continue diabetic diet  Follow Accu-Cheks     Hypertension  2 g sodium diet  Continue medications  Monitor vitals    GERD  Continue PPI     Hyperlipidemia  Continue atorvastatin     Depression  Continue venlafaxine     DVT prophylaxis  Heparin             Luis Moffett,

## 2024-02-03 NOTE — CARE PLAN
The patient's goals for the shift include      The clinical goals for the shift include patient shall remain safe while in ICU during HS.    Over the shift, the patient did not make progress toward the following goals. Barriers to progression include . Recommendations to address these barriers include .

## 2024-02-03 NOTE — CONSULTS
Wound Care Consult     Visit Date: 2/3/2024      Patient Name: Daphne Morris         MRN: [id number]           YOB: 1955     Reason for Consult: Wound care plan        Wound History: Patient admitted with Acute Hypoxic Respiratory Failure with history reviewed from medical record.  Patient seen in ED for fall from home about 1 week ago, Patient states, neighbors help her when she falls.     Pertinent Labs:   [place], Urine   Date Value Ref Range Status   07/15/2020 1,714 (H) 0 - 23 MG/L Final     Comment:     RECHECKED DILUTED  Performed at [place] [address]       Albumin   Date Value Ref Range Status   02/03/2024 2.0 (L) 3.4 - 5.0 g/dL Final     Albumin, Urine Random   Date Value Ref Range Status   02/01/2024 >2,250.0 Not established mg/L Final       Wound Assessment:  Wound 02/01/24 Skin Tear Knee Left;Anterior (Active)   Wound Image   02/02/24 0402   Site Assessment Granulation;Brown 02/03/24 0900   Brea-Wound Assessment Erythematous 02/03/24 0900   Wound Length (cm) 10 cm 02/03/24 0900   Wound Width (cm) 2 cm 02/03/24 0900   Wound Surface Area (cm^2) 20 cm^2 02/03/24 0900   Dressing Dry dressing;Kerlix/rolled gauze;Xeroform 02/03/24 0900   Dressing Changed Changed 02/03/24 0900   Dressing Status Clean;Dry 02/03/24 0900       Wound 02/01/24 Moisture Associated Skin Damage Dorsal foot;Left;Anterior (Active)   Wound Image   02/02/24 0402       Wound 02/01/24 Moisture Associated Skin Damage Dorsal foot;Right (Active)   Wound Image   02/02/24 0402       Wound 02/02/24 Other (comment) Heel Left (Active)   Site Assessment Intact 02/03/24 0900   Brea-Wound Assessment Clean 02/03/24 0900   Dressing Other (Comment) 02/03/24 0900       Wound 02/02/24 Other (comment) Heel Right (Active)   Site Assessment Intact 02/03/24 0900   Brea-Wound Assessment Clean 02/03/24 0900   Dressing Other (Comment) 02/02/24 0800       Wound Team Summary Assessment: Patient seen while finishing up  with PT.  Left medial calf unroofed blister 4 x 3.5 cm with red wound bed.  Left Knee skin tear/ wound from fall at home week ago is edematous and tender, flap intact,  BLE are bright red, wrinkly.  Right LE shin 5 x 3.5 cm deflated blister, thick, wrinkly and 1 x 1 cm unroofed blister.   BL heels boggy.     Wound Team Plan: Obtained orders for moist wound healing using Xeroform, Aquacel for moist wound to manage exudate, cover dressings.  Care provided as ordered.  Updated Denise RN, educated patient rt wound care.  Message if needed.     Day Gomez RN  2/3/2024  12:32 PM

## 2024-02-03 NOTE — CONSULTS
Inpatient consult to Pulmonology  Consult performed by: Michele Larios MD  Consult ordered by: Josette Damian MD          Reason For Consult      History Of Present Illness  Daphne Morris is a 68 y.o. female presenting with .     Past Medical History  Past Medical History:   Diagnosis Date    Acute CHF (CMS/[facility])     2023    Arthritis     Atrial fibrillation (CMS/[facility])     Chronic diastolic CHF (congestive heart failure) (CMS/[facility])     COPD (chronic obstructive pulmonary disease) (CMS/[facility])     CVA (cerebral vascular accident) (CMS/[facility])     - righ sided weakness    Depression     Diabetes (CMS/[facility])     Essential tremor     HTN (hypertension)     Hyperlipidemia     Hypothyroidism     s/p radioactive iodine treatment    Impacted cerumen, left ear     Impacted cerumen of left ear    Left ventricular ejection fraction greater than 40%     40-45%    Noncompliance     Personal history of other diseases of the respiratory system     History of acute bronchitis    Renal carcinoma, right (CMS/[facility])     s/p partial nephrectomy 2021    Vitamin D deficiency        Surgical History  Past Surgical History:   Procedure Laterality Date    ANKLE SURGERY      2013    CATARACT EXTRACTION Right     2019     SECTION, CLASSIC      MR CHEST ANGIO W AND WO IV CONTRAST  2023    MR CHEST ANGIO W AND WO IV CONTRAST 2023 [facility]    MR HEAD ANGIO WO IV CONTRAST  2021    MR HEAD ANGIO WO IV CONTRAST [facility]    NEPHRECTOMY  2021    SHOULDER SURGERY              Social History  Social History     Tobacco Use    Smoking status: Every Day     Packs/day: 0.50     Years: 50.00     Additional pack years: 0.00     Total pack years: 25.00     Types: Cigarettes     Start date: 1969     Passive exposure: Never    Smokeless tobacco: Never   Substance Use Topics    Alcohol use: Not Currently    Drug use: Yes     Types: Marijuana       Family History  Family History   Problem Relation Name  "Age of Onset    Hypertension Mother      Diabetes Father      Heart disease Father      Cancer Sister      Cancer Brother      Diabetes Daughter      Asthma Daughter      Heart attack Daughter      Diabetes Maternal Grandfather      Heart disease Paternal Grandmother     \   Allergies  Adhesive tape-silicones, Amoxicillin, Bee venom protein (honey bee), Codeine, Doxycycline, Latex, Lisinopril, Prednisone, Citalopram, Oseltamivir, and Phenyleph-shark oil-glyc-pet    Review of Systems     Physical Exam     Vital Signs  Blood pressure 171/75, pulse 53, temperature 36.1 °C (97 °F), temperature source Temporal, resp. rate 10, height 1.676 m (5' 6\"), weight 99.8 kg (220 lb 0.3 oz), SpO2 100 %.  Oxygen Therapy  SpO2: 100 %  Medical Gas Therapy: Supplemental oxygen  O2 Delivery Method: CPAP/Bi-PAP mask  FiO2 (%): 40 %    Relevant Results  XR chest 1 view 02/02/2024    Narrative  Interpreted By:  Rayna Carson,  STUDY:  XR CHEST 1 VIEW;  2/2/2024 2:29 pm    INDICATION:  Signs/Symptoms:S/p thoracentesis.    COMPARISON:  02/01/2024    ACCESSION NUMBER(S):  [id number]    ORDERING CLINICIAN:  AIDAN HERNANDEZ    TECHNIQUE:  Portable AP upright    FINDINGS:  The cardiac silhouette is enlarged but unchanged. Aorta is  atherosclerotic. There is marked improvement in the right pleural  effusion since the prior study as a result of interval thoracentesis.  No pneumothorax is present. There is minimal fluid residual blunting  the costophrenic angle. There is minimal atelectasis at the right  lung base. Pulmonary vasculature appears congested. No interval  change is noted in the osseous structures.    Impression  Marked interval improvement in the right pleural effusion with  interval thoracentesis. No pneumothorax.    Pulmonary vascular congestion    Minimal atelectasis right lung base    MACRO:  None.    Signed by: Rayna Carson 2/2/2024 3:23 PM  Dictation workstation:   [id number]         Assessment/Plan         I spent  " minutes in the professional and overall care of this patient.      Michele Larios MD

## 2024-02-03 NOTE — PROGRESS NOTES
Daphne Morris is a 68 y.o. female on day 1 of admission presenting with CHF (congestive heart failure), NYHA class I, acute on chronic, combined (CMS/HCC).      Subjective     Daphne reports she is feeling much better. She is currently on 2 L nc and reports an improvement in her swelling. She states that her shortness of breath has improved.     Review of systems:  Constitutional: negative for fever, chills, or malaise  Neuro: negative for dizziness, headache, numbness, tingling  ENT: Negative for nasal congestion or sore throat  Resp: negative for shortness of breath, cough, or wheezing  CV: negative for chest pain, palpitations  GI: negative for abd pain, nausea, vomiting or diarrhea  : negative for dysuria, frequency, or urgency  Skin: negative for lesions, wounds, or rash  Musculoskeletal: Negative for weakness, myalgia, or arthralgia  Endocrine: Negative for polyuria or polydipsia         Objective   Constitutional: Well developed, awake/alert/oriented x3, no distress, alert and cooperative  Eyes: PERRL, EOMI, clear sclera  ENMT: mucous membranes moist, no apparent injury, no lesions seen  Head/Neck: Neck supple, no apparent injury, thyroid without mass or tenderness, No JVD, trachea midline, no bruits  Respiratory/Thorax: Patent airways, CTAB, diminished and crackles in bases, breath sounds with good chest expansion, thorax symmetric  Cardiovascular: Regular, rate and rhythm, no murmurs, 2+ equal pulses of the extremities, normal S 1and S 2  Gastrointestinal: Nondistended, soft, non-tender, no rebound tenderness or guarding, no masses palpable, no organomegaly, +BS, no bruits  Musculoskeletal: ROM intact, no joint swelling, normal strength  Extremities: normal extremities, no cyanosis, 2+ pitting edema BUE and lower extremities, contusions or wounds, no clubbing  Neurological: alert and oriented x3, intact senses, motor, response and reflexes, normal strength  Lymphatic: No significant  "lymphadenopathy  Psychological: Appropriate mood and behavior  Skin: Warm and dry, no lesions, no rashes      Last Recorded Vitals  /84   Pulse 56   Temp 36.4 °C (97.5 °F) (Temporal)   Resp 16   Ht 1.676 m (5' 6\")   Wt 99.8 kg (220 lb 0.3 oz)   SpO2 98%   BMI 35.51 kg/m²     Intake/Output last 3 Shifts:  I/O last 3 completed shifts:  In: 740 (7.4 mL/kg) [P.O.:640; IV Piggyback:100]  Out: 1180 (11.8 mL/kg) [Urine:1180 (0.3 mL/kg/hr)]  Weight: 99.8 kg   I/O this shift:  In: 600 [P.O.:600]  Out: 175 [Urine:175]    Relevant Results  Scheduled medications  amiodarone, 200 mg, oral, Daily with breakfast  amLODIPine, 5 mg, oral, Daily  aspirin, 81 mg, oral, Daily  atorvastatin, 40 mg, oral, Daily  azithromycin, 500 mg, oral, q24h SUMIT  cefTRIAXone, 1 g, intravenous, q24h  clopidogrel, 75 mg, oral, Daily  ferrous gluconate, 324 mg, oral, Daily with breakfast  furosemide, 40 mg, intravenous, q12h  heparin (porcine), 5,000 Units, subcutaneous, q8h  insulin glargine, 14 Units, subcutaneous, Nightly  insulin lispro, 0-15 Units, subcutaneous, TID with meals  ipratropium-albuteroL, 3 mL, nebulization, TID  levothyroxine, 200 mcg, oral, Daily before breakfast  levothyroxine, 50 mcg, oral, Daily before breakfast  methylPREDNISolone sodium succinate (PF), 40 mg, intravenous, Daily  metoprolol succinate XL, 50 mg, oral, BID  pantoprazole, 40 mg, oral, BID  [Held by provider] potassium chloride CR, 20 mEq, oral, Daily  [Held by provider] SITagliptin phosphate, 25 mg, oral, Daily  venlafaxine XR, 75 mg, oral, Daily      Continuous medications     PRN medications  PRN medications: acetaminophen, dextrose 10 % in water (D10W), dextrose, glucagon, HYDROmorphone, ipratropium-albuteroL, oxygen, oxygen, oxygen    Results for orders placed or performed during the hospital encounter of 02/02/24 (from the past 24 hour(s))   BLOOD GAS VENOUS   Result Value Ref Range    POCT pH, Venous 7.28 (L) 7.33 - 7.43 pH    POCT pCO2, Venous 69 " (H) 41 - 51 mm Hg    POCT pO2, Venous 29 (L) 35 - 45 mm Hg    POCT SO2, Venous 49 45 - 75 %    POCT Oxy Hemoglobin, Venous 48.1 45.0 - 75.0 %    POCT Base Excess, Venous 3.5 (H) -2.0 - 3.0 mmol/L    POCT HCO3 Calculated, Venous 32.4 (H) 22.0 - 26.0 mmol/L    Patient Temperature      FiO2 40 %   POCT GLUCOSE   Result Value Ref Range    POCT Glucose 159 (H) 74 - 99 mg/dL   Renal Function Panel   Result Value Ref Range    Glucose 190 (H) 74 - 99 mg/dL    Sodium 135 (L) 136 - 145 mmol/L    Potassium 5.0 3.5 - 5.3 mmol/L    Chloride 101 98 - 107 mmol/L    Bicarbonate 30 21 - 32 mmol/L    Anion Gap 9 (L) 10 - 20 mmol/L    Urea Nitrogen 50 (H) 6 - 23 mg/dL    Creatinine 3.28 (H) 0.50 - 1.05 mg/dL    eGFR 15 (L) >60 mL/min/1.73m*2    Calcium 7.2 (L) 8.6 - 10.3 mg/dL    Phosphorus 5.8 (H) 2.5 - 4.9 mg/dL    Albumin 2.0 (L) 3.4 - 5.0 g/dL   CBC   Result Value Ref Range    WBC 4.1 (L) 4.4 - 11.3 x10*3/uL    nRBC 0.0 0.0 - 0.0 /100 WBCs    RBC 2.80 (L) 4.00 - 5.20 x10*6/uL    Hemoglobin 8.3 (L) 12.0 - 16.0 g/dL    Hematocrit 27.8 (L) 36.0 - 46.0 %    MCV 99 80 - 100 fL    MCH 29.6 26.0 - 34.0 pg    MCHC 29.9 (L) 32.0 - 36.0 g/dL    RDW 14.5 11.5 - 14.5 %    Platelets 270 150 - 450 x10*3/uL   BLOOD GAS VENOUS   Result Value Ref Range    POCT pH, Venous 7.36 7.33 - 7.43 pH    POCT pCO2, Venous 55 (H) 41 - 51 mm Hg    POCT pO2, Venous 38 35 - 45 mm Hg    POCT SO2, Venous 65 45 - 75 %    POCT Oxy Hemoglobin, Venous 64.3 45.0 - 75.0 %    POCT Base Excess, Venous 4.2 (H) -2.0 - 3.0 mmol/L    POCT HCO3 Calculated, Venous 31.1 (H) 22.0 - 26.0 mmol/L    Patient Temperature      FiO2 40 %   POCT GLUCOSE   Result Value Ref Range    POCT Glucose 166 (H) 74 - 99 mg/dL   POCT GLUCOSE   Result Value Ref Range    POCT Glucose 169 (H) 74 - 99 mg/dL   Blood Gas Arterial   Result Value Ref Range    POCT pH, Arterial 7.37 (L) 7.38 - 7.42 pH    POCT pCO2, Arterial 50 (H) 38 - 42 mm Hg    POCT pO2, Arterial 57 (L) 85 - 95 mm Hg    POCT SO2, Arterial  89 (L) 94 - 100 %    POCT Oxy Hemoglobin, Arterial 88.0 (L) 94.0 - 98.0 %    POCT Base Excess, Arterial 2.7 -2.0 - 3.0 mmol/L    POCT HCO3 Calculated, Arterial 28.9 (H) 22.0 - 26.0 mmol/L    Patient Temperature      FiO2 21 %    Site of Arterial Puncture Radial Left     Jonathan's Test Positive    POCT GLUCOSE   Result Value Ref Range    POCT Glucose 247 (H) 74 - 99 mg/dL       XR chest 1 view   Final Result   Marked interval improvement in the right pleural effusion with   interval thoracentesis. No pneumothorax.        Pulmonary vascular congestion        Minimal atelectasis right lung base        MACRO:   None.        Signed by: Rayna Carson 2/2/2024 3:23 PM   Dictation workstation:   [id number]      XR knee left 4+ views   Final Result   1. Small joint effusion and degenerative change of the knee without   osseous injury evident. Knowledge of any trauma may be helpful. If   there is persistent concern for osseous injury, cross-sectional   imaging can be considered.   2. Reticular increased density in the subcutaneous tissues which may   represent lymphedema and/or cellulitis.        MACRO:   None        Signed by: Adama Jonas 2/2/2024 4:24 PM   Dictation workstation:   [id number]      US renal complete   Final Result   No left hydronephrosis. Right total nephrectomy.        MACRO:   None        Signed by: Amando Starks 2/2/2024 2:36 PM   Dictation workstation:   [id number]      Transthoracic Echo (TTE) Complete    (Results Pending)       No echocardiogram results found for the past 12 months         Assessment/Plan   Principal Problem:    CHF (congestive heart failure), NYHA class I, acute on chronic, combined (CMS/[facility])    Echocardiogram from August 2023 (post watchman) reviewed: LVEF 55%, small to moderate pericardial effusions         1. Acute on chronic hypoxic/hypercapnic respiratory failure 2/2 acute on chronic diastolic CHF and pneumonia, COPD exac, Pleural effusion  -CXR showed large right  pleural effusion  -CTA of the chest showed large right pleural effusion with complete collapse of the right lower lung, pneumonia, a small pericardial effusion, and soft tissue edema at the chest and abd wall.   -antibiotics  -bronchodilators  -Steroids  -oxygen support/Bipap  -sputum culture  -blood cultures  -Significant leg to abd swelling and shortness of breath  -BNP 1786  -Strict I & Os  -Daily weights  -2gm na diet  -Repeat echo  -Unable to start SGLT2 inhibitors with current GFR  -Cont Lasix IV->if no good response, may need lasix gtt  -Pulmonary following, planning for thoracentesis  -Had a left and right heart cath planned for Jan 17th but unable to lay flat->consider as inpatient once respiratory status improved  -Monitor on tele     2. Elevated troponins-non MI elevation  -Denies chest pain  -Does have shortness of breath-worsening for weeks  -Had a LHC planned 2 weeks ago->unable to lay flat  -Cont asa, plavix, statin, metoprolol  -Possible LHC next week if respiratory status improved  -Monitor on tele     3. Paroxysmal atrial fibrillation with RVR  -s/p Watchman in Aug 2023  -Currently SB  -Cont amiodarone and metoprolol for rate control  -Monitor on tele     4. Hypertension  -stable  -2gm na diet  -cont meds  -monitor     5. Anemia  -Appear stable     6. Acute on Chronic kidney disease  -s/p nephrectomy  -Renal consulted, note reviewed  -Renal US negative  -Monitor closely on Lasix IV     7. Diabetes  -ISS  -Accuchecks  -Check hgb A1C     8. Hypothyroidism  -Cont synthroid     9. Pericardial effusion  -No signs of tamponade  -Check echo      CARLENE Redmond-CNP

## 2024-02-03 NOTE — PROGRESS NOTES
Occupational Therapy    Evaluation    Patient Name: Daphne Morris  MRN: [id number]  Today's Date: 2/3/2024  Time Calculation  Start Time: 1103  Stop Time: 1131  Time Calculation (min): 28 min    Assessment  IP OT Assessment  OT Assessment: Pt deconditioned; presents with decreased endurance, decreased ADL, decreased funcitonal mobility. Continued skilled OT recommended to maximize pt safety and independence to return to OF. Pt understands the need for additional rehab but states she would decline placement. Education provided on benefits of SNF vs C  Prognosis: Good  Barriers to Discharge: None  Evaluation/Treatment Tolerance: Patient limited by fatigue  Medical Staff Made Aware: Yes  End of Session Communication: Bedside nurse  End of Session Patient Position: Bed, 3 rail up, Alarm off, caregiver present (wound RN present)  Plan:  Treatment Interventions: ADL retraining, Functional transfer training, UE strengthening/ROM, Endurance training, Compensatory technique education  OT Frequency: 3 times per week  OT Discharge Recommendations: Moderate intensity level of continued care  Equipment Recommended upon Discharge: Wheeled walker  OT Recommended Transfer Status: Assist of 1  OT - OK to Discharge: Yes (per OT POC)    Subjective   Current Problem:  1. CHF (congestive heart failure), NYHA class I, acute on chronic, combined (CMS/HCC)  Transthoracic Echo (TTE) Complete    Transthoracic Echo (TTE) Complete        General:  General  Reason for Referral: 69 yo female referred to OT for CHF, general weakness, impaired ADL, impaired mobility  Referred By: Milton Fishman DO  Past Medical History Relevant to Rehab: heart failure, atrial fibrillation, Hypertension, type 2 DM  Prior to Session Communication: Bedside nurse  Patient Position Received: Bed, 3 rail up, Alarm off, not on at start of session  General Comment: Pt pleasant, agreeable to therapy, reporting she hasn't gotten out of bed for 5 days. Pt questionable  historian, AOx2. Int catheter, ICU monitoring, O2 in place. Ace bandages wrapped around BLE.  Precautions:  Medical Precautions: Fall precautions    Pain:  Pain Assessment  Pain Assessment: 0-10  Pain Score: 0 - No pain    Objective   Cognition:  Orientation Level: Disoriented to time    Home Living:  Type of Home: Mobile home  Lives With: Alone  Home Adaptive Equipment:  (rollator)  Home Layout: One level  Home Access: Stairs to enter with rails  Entrance Stairs-Rails: Both  Entrance Stairs-Number of Steps: 5-6  Bathroom Shower/Tub: Tub/shower unit, Walk-in shower  Bathroom Toilet: Adaptive toilet seating  Bathroom Equipment: Raised toilet seat with rails   Prior Function:  Level of [place]: Independent with ADLs and functional transfers, Needs assistance with homemaking  Prior Function Comments: Spongebathed PTA; neighbor comes over and helps with all IADL; used rollator for household amb and doesn't leave home unless needed. Admits to extensive hx of falls at home  IADL History:     ADL:  Eating Assistance: Independent  Grooming Assistance: Minimal  Bathing Assistance: Maximal  UE Dressing Assistance: Minimal  LE Dressing Assistance: Maximal  Toileting Assistance with Device: Maximal  Functional Assistance: Maximal  ADL Comments: ADL performance anticipated d/t current clinical presentation.  Activity Tolerance:  Endurance: Tolerates less than 10 min exercise, no significant change in vital signs  Bed Mobility/Transfers: Bed Mobility  Bed Mobility: Yes  Bed Mobility 1  Bed Mobility 1: Supine to sitting  Level of Assistance 1: Minimum assistance  Bed Mobility Comments 1: assist to hoist into sit with UE  Bed Mobility 2  Bed Mobility  2: Sitting to supine, Scooting  Level of Assistance 2: Maximum assistance (x2)  Bed Mobility Comments 2: assist at UE/trunk to recline, boost towards HOB    Transfers  Transfer: No (Pt declines, not physically safe/appropriate to attempt)    Sitting Balance:  Static Sitting  Balance  Static Sitting-Balance Support: Feet supported, No upper extremity supported  Static Sitting-Level of Assistance: Close supervision  Dynamic Sitting Balance  Dynamic Sitting-Balance Support: Feet supported, No upper extremity supported  Dynamic Sitting-Balance: Lateral lean, Forward lean  Dynamic Sitting-Comments: MIN A during BUE ROM, LOBx1    Strength:  Strength Comments: BUE shoulder grossly 3/5, elbow to distal 5/5    Hand Function:  Hand Function  Gross Grasp: Functional  Coordination: Functional  Extremities: RUE   RUE : Within Functional Limits and LUE   LUE: Within Functional Limits    Outcome Measures: [facility] Daily Activity  Putting on and taking off regular lower body clothing: Total  Bathing (including washing, rinsing, drying): A lot  Putting on and taking off regular upper body clothing: A little  Toileting, which includes using toilet, bedpan or urinal: Total  Taking care of personal grooming such as brushing teeth: A little  Eating Meals: None  Daily Activity - Total Score: 14      Education Documentation  Body Mechanics, taught by Trevor Cruz OT at 2/3/2024 12:04 PM.  Learner: Patient  Readiness: Acceptance  Method: Explanation  Response: Verbalizes Understanding    Precautions, taught by Trevor Cruz OT at 2/3/2024 12:04 PM.  Learner: Patient  Readiness: Acceptance  Method: Explanation  Response: Verbalizes Understanding    ADL Training, taught by Trevor Cruz OT at 2/3/2024 12:04 PM.  Learner: Patient  Readiness: Acceptance  Method: Explanation  Response: Verbalizes Understanding    Education Comments  No comments found.      Goals:   Encounter Problems       Encounter Problems (Active)       OT Goals       Pt will tolerate 10min stand during functional task completion with no more than 1 rest break in order to increase endurance for functional task completion.  (Progressing)       Start:  02/03/24    Expected End:  02/17/24            Pt will increase endurance to  tolerate 15min of OOB activity with no more than 1 rest break in order to increase ability to engage in ADL completion.  (Progressing)       Start:  02/03/24    Expected End:  02/17/24            Pt will demo increased functional mobility to tolerate tasks necessary to complete ADL routine.  (Progressing)       Start:  02/03/24    Expected End:  02/17/24            Pt will demo ADL routine and meaningful daily activities using modifications as needed  (Progressing)       Start:  02/03/24    Expected End:  02/17/24            Pt will demo and/or verbalize 2-3 energy conservation techniques to incorporate into functional mobility or ADL to improve performance and increase independence.. (Progressing)       Start:  02/03/24    Expected End:  02/17/24

## 2024-02-04 LAB
ALBUMIN SERPL BCP-MCNC: 2.2 G/DL (ref 3.4–5)
ANION GAP SERPL CALC-SCNC: 14 MMOL/L (ref 10–20)
BASE EXCESS BLDV CALC-SCNC: 0.4 MMOL/L (ref -2–3)
BODY TEMPERATURE: ABNORMAL
BUN SERPL-MCNC: 56 MG/DL (ref 6–23)
CALCIUM SERPL-MCNC: 7.3 MG/DL (ref 8.6–10.3)
CHLORIDE SERPL-SCNC: 104 MMOL/L (ref 98–107)
CO2 SERPL-SCNC: 26 MMOL/L (ref 21–32)
CREAT SERPL-MCNC: 3.49 MG/DL (ref 0.5–1.05)
EGFRCR SERPLBLD CKD-EPI 2021: 14 ML/MIN/1.73M*2
ERYTHROCYTE [DISTWIDTH] IN BLOOD BY AUTOMATED COUNT: 14.4 % (ref 11.5–14.5)
GLUCOSE BLD MANUAL STRIP-MCNC: 117 MG/DL (ref 74–99)
GLUCOSE BLD MANUAL STRIP-MCNC: 170 MG/DL (ref 74–99)
GLUCOSE BLD MANUAL STRIP-MCNC: 230 MG/DL (ref 74–99)
GLUCOSE BLD MANUAL STRIP-MCNC: 274 MG/DL (ref 74–99)
GLUCOSE SERPL-MCNC: 189 MG/DL (ref 74–99)
HCO3 BLDV-SCNC: 28 MMOL/L (ref 22–26)
HCT VFR BLD AUTO: 27.9 % (ref 36–46)
HGB BLD-MCNC: 8.2 G/DL (ref 12–16)
INHALED O2 CONCENTRATION: 28 %
MCH RBC QN AUTO: 29.7 PG (ref 26–34)
MCHC RBC AUTO-ENTMCNC: 29.4 G/DL (ref 32–36)
MCV RBC AUTO: 101 FL (ref 80–100)
NRBC BLD-RTO: 0 /100 WBCS (ref 0–0)
OXYHGB MFR BLDV: 82.6 % (ref 45–75)
PCO2 BLDV: 57 MM HG (ref 41–51)
PH BLDV: 7.3 PH (ref 7.33–7.43)
PHOSPHATE SERPL-MCNC: 6 MG/DL (ref 2.5–4.9)
PLATELET # BLD AUTO: 279 X10*3/UL (ref 150–450)
PO2 BLDV: 52 MM HG (ref 35–45)
POTASSIUM SERPL-SCNC: 5 MMOL/L (ref 3.5–5.3)
RBC # BLD AUTO: 2.76 X10*6/UL (ref 4–5.2)
SAO2 % BLDV: 84 % (ref 45–75)
SODIUM SERPL-SCNC: 139 MMOL/L (ref 136–145)
WBC # BLD AUTO: 7.4 X10*3/UL (ref 4.4–11.3)

## 2024-02-04 PROCEDURE — 80069 RENAL FUNCTION PANEL: CPT | Performed by: FAMILY MEDICINE

## 2024-02-04 PROCEDURE — 82947 ASSAY GLUCOSE BLOOD QUANT: CPT

## 2024-02-04 PROCEDURE — 2500000004 HC RX 250 GENERAL PHARMACY W/ HCPCS (ALT 636 FOR OP/ED)

## 2024-02-04 PROCEDURE — 97162 PT EVAL MOD COMPLEX 30 MIN: CPT | Mod: GP

## 2024-02-04 PROCEDURE — 99233 SBSQ HOSP IP/OBS HIGH 50: CPT | Performed by: FAMILY MEDICINE

## 2024-02-04 PROCEDURE — 36415 COLL VENOUS BLD VENIPUNCTURE: CPT | Performed by: INTERNAL MEDICINE

## 2024-02-04 PROCEDURE — 2500000005 HC RX 250 GENERAL PHARMACY W/O HCPCS: Performed by: FAMILY MEDICINE

## 2024-02-04 PROCEDURE — 99233 SBSQ HOSP IP/OBS HIGH 50: CPT | Performed by: INTERNAL MEDICINE

## 2024-02-04 PROCEDURE — 2500000002 HC RX 250 W HCPCS SELF ADMINISTERED DRUGS (ALT 637 FOR MEDICARE OP, ALT 636 FOR OP/ED): Performed by: INTERNAL MEDICINE

## 2024-02-04 PROCEDURE — 2060000001 HC INTERMEDIATE ICU ROOM DAILY

## 2024-02-04 PROCEDURE — 82805 BLOOD GASES W/O2 SATURATION: CPT | Performed by: INTERNAL MEDICINE

## 2024-02-04 PROCEDURE — 2500000002 HC RX 250 W HCPCS SELF ADMINISTERED DRUGS (ALT 637 FOR MEDICARE OP, ALT 636 FOR OP/ED)

## 2024-02-04 PROCEDURE — 36415 COLL VENOUS BLD VENIPUNCTURE: CPT | Performed by: FAMILY MEDICINE

## 2024-02-04 PROCEDURE — 85027 COMPLETE CBC AUTOMATED: CPT | Performed by: FAMILY MEDICINE

## 2024-02-04 PROCEDURE — 2500000001 HC RX 250 WO HCPCS SELF ADMINISTERED DRUGS (ALT 637 FOR MEDICARE OP)

## 2024-02-04 PROCEDURE — 94640 AIRWAY INHALATION TREATMENT: CPT | Mod: MUE

## 2024-02-04 RX ORDER — METHYLPREDNISOLONE 4 MG/1
32 TABLET ORAL DAILY
Status: DISCONTINUED | OUTPATIENT
Start: 2024-02-05 | End: 2024-02-05

## 2024-02-04 RX ADMIN — LEVOTHYROXINE SODIUM 50 MCG: 50 TABLET ORAL at 07:45

## 2024-02-04 RX ADMIN — AMIODARONE HYDROCHLORIDE 200 MG: 200 TABLET ORAL at 08:38

## 2024-02-04 RX ADMIN — AMLODIPINE BESYLATE 5 MG: 5 TABLET ORAL at 08:39

## 2024-02-04 RX ADMIN — VENLAFAXINE HYDROCHLORIDE 75 MG: 75 CAPSULE, EXTENDED RELEASE ORAL at 08:38

## 2024-02-04 RX ADMIN — PANTOPRAZOLE SODIUM 40 MG: 40 TABLET, DELAYED RELEASE ORAL at 08:38

## 2024-02-04 RX ADMIN — INSULIN LISPRO 3 UNITS: 100 INJECTION, SOLUTION INTRAVENOUS; SUBCUTANEOUS at 11:52

## 2024-02-04 RX ADMIN — INSULIN LISPRO 6 UNITS: 100 INJECTION, SOLUTION INTRAVENOUS; SUBCUTANEOUS at 16:49

## 2024-02-04 RX ADMIN — INSULIN GLARGINE 14 UNITS: 100 INJECTION, SOLUTION SUBCUTANEOUS at 20:53

## 2024-02-04 RX ADMIN — HEPARIN SODIUM 5000 UNITS: 5000 INJECTION INTRAVENOUS; SUBCUTANEOUS at 05:27

## 2024-02-04 RX ADMIN — CLOPIDOGREL 75 MG: 75 TABLET ORAL at 08:38

## 2024-02-04 RX ADMIN — HEPARIN SODIUM 5000 UNITS: 5000 INJECTION INTRAVENOUS; SUBCUTANEOUS at 11:52

## 2024-02-04 RX ADMIN — HEPARIN SODIUM 5000 UNITS: 5000 INJECTION INTRAVENOUS; SUBCUTANEOUS at 20:53

## 2024-02-04 RX ADMIN — FUROSEMIDE 40 MG: 10 INJECTION, SOLUTION INTRAMUSCULAR; INTRAVENOUS at 20:52

## 2024-02-04 RX ADMIN — FERROUS GLUCONATE 324 MG: 324 TABLET ORAL at 08:38

## 2024-02-04 RX ADMIN — ASPIRIN 81 MG: 81 TABLET, COATED ORAL at 08:38

## 2024-02-04 RX ADMIN — METHYLPREDNISOLONE SODIUM SUCCINATE 40 MG: 40 INJECTION, POWDER, FOR SOLUTION INTRAMUSCULAR; INTRAVENOUS at 08:38

## 2024-02-04 RX ADMIN — IPRATROPIUM BROMIDE AND ALBUTEROL SULFATE 3 ML: 2.5; .5 SOLUTION RESPIRATORY (INHALATION) at 20:26

## 2024-02-04 RX ADMIN — PANTOPRAZOLE SODIUM 40 MG: 40 TABLET, DELAYED RELEASE ORAL at 20:52

## 2024-02-04 RX ADMIN — Medication 2 L/MIN: at 07:25

## 2024-02-04 RX ADMIN — IPRATROPIUM BROMIDE AND ALBUTEROL SULFATE 3 ML: 2.5; .5 SOLUTION RESPIRATORY (INHALATION) at 07:23

## 2024-02-04 RX ADMIN — ATORVASTATIN CALCIUM 40 MG: 40 TABLET, FILM COATED ORAL at 08:38

## 2024-02-04 RX ADMIN — METOPROLOL SUCCINATE 50 MG: 50 TABLET, EXTENDED RELEASE ORAL at 20:52

## 2024-02-04 RX ADMIN — LEVOTHYROXINE SODIUM 200 MCG: 100 TABLET ORAL at 07:45

## 2024-02-04 RX ADMIN — IPRATROPIUM BROMIDE AND ALBUTEROL SULFATE 3 ML: 2.5; .5 SOLUTION RESPIRATORY (INHALATION) at 13:32

## 2024-02-04 ASSESSMENT — COGNITIVE AND FUNCTIONAL STATUS - GENERAL
MOBILITY SCORE: 12
MOVING FROM LYING ON BACK TO SITTING ON SIDE OF FLAT BED WITH BEDRAILS: A LOT
CLIMB 3 TO 5 STEPS WITH RAILING: A LOT
TURNING FROM BACK TO SIDE WHILE IN FLAT BAD: A LOT
MOVING TO AND FROM BED TO CHAIR: A LOT
STANDING UP FROM CHAIR USING ARMS: A LOT
WALKING IN HOSPITAL ROOM: A LOT

## 2024-02-04 ASSESSMENT — PAIN SCALES - GENERAL
PAINLEVEL_OUTOF10: 0 - NO PAIN

## 2024-02-04 ASSESSMENT — PAIN - FUNCTIONAL ASSESSMENT
PAIN_FUNCTIONAL_ASSESSMENT: 0-10
PAIN_FUNCTIONAL_ASSESSMENT: 0-10
PAIN_FUNCTIONAL_ASSESSMENT: CPOT (CRITICAL CARE PAIN OBSERVATION TOOL)

## 2024-02-04 ASSESSMENT — PAIN SCALES - WONG BAKER: WONGBAKER_NUMERICALRESPONSE: NO HURT

## 2024-02-04 NOTE — PROGRESS NOTES
"Daphne Morris is a 68 y.o. female on day 1 of admission presenting with CHF (congestive heart failure), NYHA class I, acute on chronic, combined (CMS/HCC).    Subjective   Feels better today,.  Less edema.       Objective     Physical Exam  Vitals reviewed.   Constitutional:       Appearance: Normal appearance.   HENT:      Head: Normocephalic and atraumatic.   Eyes:      Extraocular Movements: Extraocular movements intact.   Cardiovascular:      Rate and Rhythm: Normal rate and regular rhythm.      Heart sounds: Normal heart sounds.   Pulmonary:      Effort: Pulmonary effort is normal.      Comments: Coarse breath sounds bilaterally   Abdominal:      Palpations: Abdomen is soft.      Tenderness: There is no abdominal tenderness.   Musculoskeletal:      Cervical back: Normal range of motion.      Right lower le+ Pitting Edema present.      Left lower le+ Pitting Edema present.   Skin:     General: Skin is warm.   Neurological:      General: No focal deficit present.      Mental Status: She is alert and oriented to person, place, and time. Mental status is at baseline.   Psychiatric:         Mood and Affect: Mood normal.         Behavior: Behavior normal.         Last Recorded Vitals  Blood pressure 126/58, pulse 57, temperature 37 °C (98.6 °F), temperature source Temporal, resp. rate 19, height 1.676 m (5' 6\"), weight 99.8 kg (220 lb 0.3 oz), SpO2 97 %.  Intake/Output last 3 Shifts:  I/O last 3 completed shifts:  In: 1340 (13.4 mL/kg) [P.O.:1240; IV Piggyback:100]  Out: 1355 (13.6 mL/kg) [Urine:1355 (0.4 mL/kg/hr)]  Weight: 99.8 kg     Relevant Results  Scheduled medications  amiodarone, 200 mg, oral, Daily with breakfast  amLODIPine, 5 mg, oral, Daily  aspirin, 81 mg, oral, Daily  atorvastatin, 40 mg, oral, Daily  azithromycin, 500 mg, oral, q24h SUMIT  cefTRIAXone, 1 g, intravenous, q24h  clopidogrel, 75 mg, oral, Daily  ferrous gluconate, 324 mg, oral, Daily with breakfast  furosemide, 40 mg, intravenous, " q12h  heparin (porcine), 5,000 Units, subcutaneous, q8h  insulin glargine, 14 Units, subcutaneous, Nightly  insulin lispro, 0-15 Units, subcutaneous, TID with meals  ipratropium-albuteroL, 3 mL, nebulization, TID  levothyroxine, 200 mcg, oral, Daily before breakfast  levothyroxine, 50 mcg, oral, Daily before breakfast  methylPREDNISolone sodium succinate (PF), 40 mg, intravenous, Daily  metoprolol succinate XL, 50 mg, oral, BID  pantoprazole, 40 mg, oral, BID  [Held by provider] potassium chloride CR, 20 mEq, oral, Daily  [Held by provider] SITagliptin phosphate, 25 mg, oral, Daily  venlafaxine XR, 75 mg, oral, Daily      Continuous medications     PRN medications  PRN medications: acetaminophen, dextrose 10 % in water (D10W), dextrose, glucagon, HYDROmorphone, ipratropium-albuteroL, oxygen, oxygen, oxygen    Results for orders placed or performed during the hospital encounter of 02/02/24 (from the past 24 hour(s))   Renal Function Panel   Result Value Ref Range    Glucose 190 (H) 74 - 99 mg/dL    Sodium 135 (L) 136 - 145 mmol/L    Potassium 5.0 3.5 - 5.3 mmol/L    Chloride 101 98 - 107 mmol/L    Bicarbonate 30 21 - 32 mmol/L    Anion Gap 9 (L) 10 - 20 mmol/L    Urea Nitrogen 50 (H) 6 - 23 mg/dL    Creatinine 3.28 (H) 0.50 - 1.05 mg/dL    eGFR 15 (L) >60 mL/min/1.73m*2    Calcium 7.2 (L) 8.6 - 10.3 mg/dL    Phosphorus 5.8 (H) 2.5 - 4.9 mg/dL    Albumin 2.0 (L) 3.4 - 5.0 g/dL   CBC   Result Value Ref Range    WBC 4.1 (L) 4.4 - 11.3 x10*3/uL    nRBC 0.0 0.0 - 0.0 /100 WBCs    RBC 2.80 (L) 4.00 - 5.20 x10*6/uL    Hemoglobin 8.3 (L) 12.0 - 16.0 g/dL    Hematocrit 27.8 (L) 36.0 - 46.0 %    MCV 99 80 - 100 fL    MCH 29.6 26.0 - 34.0 pg    MCHC 29.9 (L) 32.0 - 36.0 g/dL    RDW 14.5 11.5 - 14.5 %    Platelets 270 150 - 450 x10*3/uL   BLOOD GAS VENOUS   Result Value Ref Range    POCT pH, Venous 7.36 7.33 - 7.43 pH    POCT pCO2, Venous 55 (H) 41 - 51 mm Hg    POCT pO2, Venous 38 35 - 45 mm Hg    POCT SO2, Venous 65 45 - 75 %     POCT Oxy Hemoglobin, Venous 64.3 45.0 - 75.0 %    POCT Base Excess, Venous 4.2 (H) -2.0 - 3.0 mmol/L    POCT HCO3 Calculated, Venous 31.1 (H) 22.0 - 26.0 mmol/L    Patient Temperature      FiO2 40 %   POCT GLUCOSE   Result Value Ref Range    POCT Glucose 166 (H) 74 - 99 mg/dL   POCT GLUCOSE   Result Value Ref Range    POCT Glucose 169 (H) 74 - 99 mg/dL   Blood Gas Arterial   Result Value Ref Range    POCT pH, Arterial 7.37 (L) 7.38 - 7.42 pH    POCT pCO2, Arterial 50 (H) 38 - 42 mm Hg    POCT pO2, Arterial 57 (L) 85 - 95 mm Hg    POCT SO2, Arterial 89 (L) 94 - 100 %    POCT Oxy Hemoglobin, Arterial 88.0 (L) 94.0 - 98.0 %    POCT Base Excess, Arterial 2.7 -2.0 - 3.0 mmol/L    POCT HCO3 Calculated, Arterial 28.9 (H) 22.0 - 26.0 mmol/L    Patient Temperature      FiO2 21 %    Site of Arterial Puncture Radial Left     Jonathan's Test Positive    POCT GLUCOSE   Result Value Ref Range    POCT Glucose 247 (H) 74 - 99 mg/dL   POCT GLUCOSE   Result Value Ref Range    POCT Glucose 210 (H) 74 - 99 mg/dL             Assessment/Plan   Principal Problem:    CHF (congestive heart failure), NYHA class I, acute on chronic, combined (CMS/[facility])    69 yo woman w h/o afib, COPD, chronic diastolic heart failure admitted w/ acute hypoxic resp failure      Acute hypoxic and hypercapnic resp failure 2/2 acute on chronic diastolic heart failure, COPD exacerbation, possible PNA - Improved on NC.  Hold BIPAP tonight.  Repeat blood gas in am.  Cont diuresis.  OK to dc abx.     COPD exacerbation - on solumedrol.  Cont nebs and inhalers      Pleural effusion - s/p thora w/ 2.3L drained.  Fluid sent for anaylsis     Michele Larios MD

## 2024-02-04 NOTE — PROGRESS NOTES
Physical Therapy    Physical Therapy Evaluation    Patient Name: Daphne Morris  MRN: [id number]  Today's Date: 2/4/2024   Time Calculation  Start Time: 0943  Stop Time: 1013  Time Calculation (min): 30 min    Assessment/Plan   PT Assessment  PT Assessment Results: Decreased strength, Decreased endurance, Decreased mobility (deconditioning)  Rehab Prognosis: Good  Evaluation/Treatment Tolerance: Patient limited by fatigue  Medical Staff Made Aware: Yes  Strengths: Ability to acquire knowledge  End of Session Communication: Bedside nurse  End of Session Patient Position: Bed, 3 rail up, Alarm on  IP OR SWING BED PT PLAN  Inpatient or Swing Bed: Inpatient  PT Plan  Treatment/Interventions: Bed mobility, Transfer training, Gait training, Strengthening, Therapeutic exercise  PT Plan: Skilled PT  PT Frequency: 3 times per week  PT Discharge Recommendations: Moderate intensity level of continued care  Equipment Recommended upon Discharge: Wheeled walker  PT - OK to Discharge: Yes (to progress towards goals)      Subjective   General Visit Information:  General  Reason for Referral:  (67 yo female admitted 2' to acute hypoxic respiratory failure 2' to pneumonia)  Referred By:  (Dr. ELMER Fishman)  Past Medical History Relevant to Rehab:  (heart failure, A Fib, HTN, T2 DM, OA, diastolic CHF, CVA, depression)  Prior to Session Communication: Bedside nurse  Patient Position Received: Bed, 3 rail up, Alarm on  General Comment:  (Pt cleared for PT. Pt is agreeable to work with PT. Telemetry, B LE are wrapped with bandages, Fay catheter. Pt Pt states that she hasn't been out of bed since last Monday.)  Home Living:  Home Living  Type of Home: Mobile home  Lives With: Alone  Home Adaptive Equipment: Walker rolling or standard, Cane (rollator walker)  Home Layout: One level  Home Access: Stairs to enter with rails  Entrance Stairs-Rails: Both  Entrance Stairs-Number of Steps:  (4)  Bathroom Shower/Tub: Walk-in shower  Bathroom  Equipment: Grab bars in shower, Shower chair with back, Raised toilet seat without rails  Prior Level of Function:  Prior Function Per Pt/Caregiver Report  Level of [place]: Independent with ADLs and functional transfers, Independent with homemaking with ambulation (with  rollator walker)  Ambulatory Assistance: Independent  Precautions:  Precautions  Medical Precautions: Fall precautions  Vital Signs:       Objective   Pain:  Pain Assessment  Pain Assessment: CPOT (Critical Care Pain Observation Tool)  Pain Score: 0 - No pain  Cognition:  Cognition  Overall Cognitive Status: Within Functional Limits    General Assessments:                            Strength  Strength Comments:  (B LE hip flexors are grossly 4- of 5, otherwise B LE are grossly 4+ of 5)  Static Sitting Balance  Static Sitting-Balance Support: Bilateral upper extremity supported  Static Sitting-Level of Assistance: Close supervision, Contact guard (1 person)    Static Standing Balance  Static Standing-Balance Support: Bilateral upper extremity supported (wheeled walker)  Static Standing-Level of Assistance: Minimum assistance (1 person)  Dynamic Standing Balance  Dynamic Standing-Balance Support: Bilateral upper extremity supported (wheeled walker)  Dynamic Standing-Comments:  (Moderate aassist x 1)  Functional Assessments:  Bed Mobility  Bed Mobility: Yes  Bed Mobility 1  Bed Mobility 1: Supine to sitting, Sitting to supine  Level of Assistance 1: Moderate assistance (1 person)    Transfers  Transfer: Yes  Transfer 1  Transfer From 1: Bed to  Transfer to 1: Stand  Technique 1: Sit to stand, Stand to sit  Transfer Device 1:  (wheeled walker)  Transfer Level of Assistance 1: Moderate assistance (1 person)    Ambulation/Gait Training  Ambulation/Gait Training Performed: Yes  Ambulation/Gait Training 1  Surface 1: Level tile  Device 1: Rolling walker  Assistance 1: Moderate assistance (1 person)  Quality of Gait 1: Decreased step  length  Comments/Distance (ft) 1:  (5 side steps up to HOB)  Extremity/Trunk Assessments:  RLE   RLE : Within Functional Limits  LLE   LLE : Within Functional Limits  Outcome Measures:  [facility] Basic Mobility  Turning from your back to your side while in a flat bed without using bedrails: A lot  Moving from lying on your back to sitting on the side of a flat bed without using bedrails: A lot  Moving to and from bed to chair (including a wheelchair): A lot  Standing up from a chair using your arms (e.g. wheelchair or bedside chair): A lot  To walk in hospital room: A lot  Climbing 3-5 steps with railing: A lot  Basic Mobility - Total Score: 12    Encounter Problems       Encounter Problems (Active)       Mobility       STG - Patient will ambulate 80 feet MOD I with wheeled walker (Progressing)       Start:  02/04/24    Expected End:  02/18/24            STG - Patient will ascend and descend four to six stairs MOD I with B rails (Progressing)       Start:  02/04/24    Expected End:  02/18/24               Transfers       STG - Patient to transfer to and from sit to supine independently (Progressing)       Start:  02/04/24    Expected End:  02/18/24            STG - Patient will transfer sit to and from stand MOD I with wheeled walker (Progressing)       Start:  02/04/24    Expected End:  02/18/24                   Education Documentation  No documentation found.  Education Comments  No comments found.

## 2024-02-04 NOTE — PROGRESS NOTES
Daphne Morris is a 68 y.o. female on day 2 of admission presenting with CHF (congestive heart failure), NYHA class I, acute on chronic, combined (CMS/HCC).      Subjective   No acute issues overnight       Objective     Last Recorded Vitals  /63   Pulse 58   Temp 37 °C (98.6 °F) (Temporal)   Resp 20   Wt 99.8 kg (220 lb 0.3 oz)   SpO2 97%   Intake/Output last 3 Shifts:    Intake/Output Summary (Last 24 hours) at 2/4/2024 0704  Last data filed at 2/4/2024 0546  Gross per 24 hour   Intake 600 ml   Output 525 ml   Net 75 ml         Admission Weight  Weight: 99.8 kg (220 lb 0.3 oz) (02/02/24 0404)    Daily Weight  02/03/24 : 99.8 kg (220 lb 0.3 oz)    Image Results  XR knee left 4+ views  Narrative: Interpreted By:  Adama Jonas,   STUDY:  Left knee dated  2/2/2024.      INDICATION:  Signs/Symptoms:left kne pain      COMPARISON:  Radiographs dated 10/23/2018.      ACCESSION NUMBER(S):  [id number]      ORDERING CLINICIAN:  JOSUÉ REICH      TECHNIQUE:  Four views of the left knee.      FINDINGS:  No fracture or dislocation is evident.  There is a small knee joint  effusion. There is moderate tricompartmental degenerative change of  the knee. Reticular increased density is seen in the subcutaneous  tissues.      Impression: 1. Small joint effusion and degenerative change of the knee without  osseous injury evident. Knowledge of any trauma may be helpful. If  there is persistent concern for osseous injury, cross-sectional  imaging can be considered.  2. Reticular increased density in the subcutaneous tissues which may  represent lymphedema and/or cellulitis.      MACRO:  None      Signed by: Adama Jonas 2/2/2024 4:24 PM  Dictation workstation:   [id number]  XR chest 1 view  Narrative: Interpreted By:  Rayna Carson,   STUDY:  XR CHEST 1 VIEW;  2/2/2024 2:29 pm      INDICATION:  Signs/Symptoms:S/p thoracentesis.      COMPARISON:  02/01/2024      ACCESSION NUMBER(S):  [id number]      ORDERING  CLINICIAN:  AIDAN HERNANDEZ      TECHNIQUE:  Portable AP upright      FINDINGS:  The cardiac silhouette is enlarged but unchanged. Aorta is  atherosclerotic. There is marked improvement in the right pleural  effusion since the prior study as a result of interval thoracentesis.  No pneumothorax is present. There is minimal fluid residual blunting  the costophrenic angle. There is minimal atelectasis at the right  lung base. Pulmonary vasculature appears congested. No interval  change is noted in the osseous structures.      Impression: Marked interval improvement in the right pleural effusion with  interval thoracentesis. No pneumothorax.      Pulmonary vascular congestion      Minimal atelectasis right lung base      MACRO:  None.      Signed by: Rayna Carson 2/2/2024 3:23 PM  Dictation workstation:   [id number]  US renal complete  Narrative: Interpreted By:  Amando Starks,   STUDY:  US RENAL COMPLETE; 2/2/2024 12:23 pm      INDICATION:  Signs/Symptoms:ANDREA on CKD.      COMPARISON:  Renal ultrasound 05/01/2023      ACCESSION NUMBER(S):  [id number]      ORDERING CLINICIAN:  JESSICA EPPS      TECHNIQUE:  Sonography of the kidneys and urinary bladder was performed.      FINDINGS:  Right Kidney: Right total nephrectomy.      Left Kidney:  *Renal length: 10.6 cm  *Parenchyma: Normal parenchymal echogenicity. Normal parenchymal  thickness. *Collecting system: No hydronephrosis.  *Calculus: No echogenic, shadowing calculus.  *Lesion: None.      Bladder: Normal sonographic appearance.      Impression: No left hydronephrosis. Right total nephrectomy.      MACRO:  None      Signed by: Amando Starks 2/2/2024 2:36 PM  Dictation workstation:   [id number]      Physical Exam  Constitutional:       Appearance: Normal appearance.   HENT:      Head: Normocephalic and atraumatic.      Mouth/Throat:      Mouth: Mucous membranes are moist.   Eyes:      Extraocular Movements: Extraocular movements intact.      Pupils:  Pupils are equal, round, and reactive to light.   Cardiovascular:      Rate and Rhythm: Normal rate and regular rhythm.      Pulses: Normal pulses.      Heart sounds: Normal heart sounds.   Pulmonary:      Effort: Pulmonary effort is normal.   Abdominal:      General: Abdomen is flat.      Palpations: Abdomen is soft.   Musculoskeletal:         General: Normal range of motion.      Cervical back: Normal range of motion.   Skin:     General: Skin is warm.      Capillary Refill: Capillary refill takes less than 2 seconds.   Neurological:      Mental Status: She is alert.             Assessment/Plan       Daphne Morris is a 68 y.o. female presenting with past medical history of heart failure, atrial fibrillation, Hypertension, type 2 DM transfer from [facility] due to acute hypoxic respiratory failure secondary to pneumonia, COPD exacerbation and acute diastolic heart failure.     Acute hypoxic respiratory failure secondary to pneumonia, COPD exacerbation, pleural effusion and acute diastolic heart failure.  Echocardiogram 8/2023 revealed LVEF of 55% with mildly dilated left atrium.  S/p thoracentesis with resolution of pleural effusion. 2.3L drained sent for cytology and chemistry   Continue Lasix 40 mg twice daily  Procalcitonin 0.08 Dissontinue ceftriaxone and azithromycin  Continue Solu-Medrol 40 mg daily and nebulizers  Cardiology considering Lasix drip, and also advised there is plans for a left and right heart cath as an outpatient on January 17 but was unable to lay flat, considering to do this while inpatient   and pulmonology following  Continue monitor intake and output, net output of 365cc since admission      Acute Kidney Injury on stage III chronic kidney disease  Pre renal in nature in the setting of acute decompensated Heart Failure  Renal ultrasound reveals right total nephrectomy, with no left-sided hydronephrosis  Diurese as tolerated  Nephrology following  Neurology also recommended starting SGLT 2  inhibitor as an outpatient if GFR improves to greater than 25     Elevated troponin/demand ischemia  Continue aspirin, Plavix, statin, metoprolol  Cardiology advised for possible left heart cath next week if her respiratory status improves    Left knee pain  Patient fell and was seen in the emergency room about a week ago  Diagnosed with skin tear  X-ray revealed small joint effusion, degenerative changes/no fracture     Paroxysmal atrial fibrillation  Status post watchman in August 2023  Currently in sinus rhythm  Continue amiodarone and metoprolol     Coronary artery disease  Continue, aspirin, Plavix, metoprolol     Type 2 diabetes mellitus  Continue Lantus and insulin sliding scale  Holding Jardiance  Continue diabetic diet  Follow Accu-Cheks     Hypertension  2 g sodium diet  Continue medications  Monitor vitals    GERD  Continue PPI     Hyperlipidemia  Continue atorvastatin     Depression  Continue venlafaxine     DVT prophylaxis  Heparin             Luis Moffett,

## 2024-02-05 ENCOUNTER — APPOINTMENT (OUTPATIENT)
Dept: CARDIOLOGY | Facility: HOSPITAL | Age: 69
DRG: 286 | End: 2024-02-05
Payer: MEDICARE

## 2024-02-05 ENCOUNTER — PATIENT OUTREACH (OUTPATIENT)
Dept: PRIMARY CARE | Facility: CLINIC | Age: 69
End: 2024-02-05
Payer: MEDICARE

## 2024-02-05 LAB
ALBUMIN SERPL BCP-MCNC: 2.4 G/DL (ref 3.4–5)
ANION GAP SERPL CALC-SCNC: 13 MMOL/L (ref 10–20)
AORTIC VALVE MEAN GRADIENT: 9.1 MMHG
AORTIC VALVE PEAK VELOCITY: 1.88 M/S
AV PEAK GRADIENT: 14.1 MMHG
AVA (PEAK VEL): 1.56 CM2
AVA (VTI): 1.62 CM2
BASE EXCESS BLDV CALC-SCNC: 1.8 MMOL/L (ref -2–3)
BODY TEMPERATURE: ABNORMAL
BUN SERPL-MCNC: 61 MG/DL (ref 6–23)
CALCIUM SERPL-MCNC: 7.6 MG/DL (ref 8.6–10.3)
CHLORIDE SERPL-SCNC: 103 MMOL/L (ref 98–107)
CO2 SERPL-SCNC: 28 MMOL/L (ref 21–32)
CREAT SERPL-MCNC: 3.67 MG/DL (ref 0.5–1.05)
EGFRCR SERPLBLD CKD-EPI 2021: 13 ML/MIN/1.73M*2
EJECTION FRACTION APICAL 4 CHAMBER: 63.4
EJECTION FRACTION: 63 %
ERYTHROCYTE [DISTWIDTH] IN BLOOD BY AUTOMATED COUNT: 14.5 % (ref 11.5–14.5)
GLUCOSE BLD MANUAL STRIP-MCNC: 142 MG/DL (ref 74–99)
GLUCOSE BLD MANUAL STRIP-MCNC: 151 MG/DL (ref 74–99)
GLUCOSE BLD MANUAL STRIP-MCNC: 261 MG/DL (ref 74–99)
GLUCOSE BLD MANUAL STRIP-MCNC: 295 MG/DL (ref 74–99)
GLUCOSE SERPL-MCNC: 181 MG/DL (ref 74–99)
HCO3 BLDV-SCNC: 28.6 MMOL/L (ref 22–26)
HCT VFR BLD AUTO: 27.8 % (ref 36–46)
HGB BLD-MCNC: 8.2 G/DL (ref 12–16)
INHALED O2 CONCENTRATION: 30 %
LEFT ATRIUM VOLUME AREA LENGTH INDEX BSA: 37.9 ML/M2
LEFT VENTRICLE INTERNAL DIMENSION DIASTOLE: 4.35 CM (ref 3.5–6)
LEFT VENTRICULAR OUTFLOW TRACT DIAMETER: 1.96 CM
MCH RBC QN AUTO: 30.1 PG (ref 26–34)
MCHC RBC AUTO-ENTMCNC: 29.5 G/DL (ref 32–36)
MCV RBC AUTO: 102 FL (ref 80–100)
MITRAL VALVE E/A RATIO: 2.52
MITRAL VALVE E/E' RATIO: 22.09
NRBC BLD-RTO: 0 /100 WBCS (ref 0–0)
OXYHGB MFR BLDV: 94 % (ref 45–75)
PCO2 BLDV: 53 MM HG (ref 41–51)
PH BLDV: 7.34 PH (ref 7.33–7.43)
PHOSPHATE SERPL-MCNC: 6.1 MG/DL (ref 2.5–4.9)
PLATELET # BLD AUTO: 248 X10*3/UL (ref 150–450)
PO2 BLDV: 69 MM HG (ref 35–45)
POTASSIUM SERPL-SCNC: 4.5 MMOL/L (ref 3.5–5.3)
RBC # BLD AUTO: 2.72 X10*6/UL (ref 4–5.2)
RIGHT VENTRICLE FREE WALL PEAK S': 13 CM/S
RIGHT VENTRICLE PEAK SYSTOLIC PRESSURE: 39.9 MMHG
SAO2 % BLDV: 95 % (ref 45–75)
SODIUM SERPL-SCNC: 139 MMOL/L (ref 136–145)
TRICUSPID ANNULAR PLANE SYSTOLIC EXCURSION: 1.8 CM
WBC # BLD AUTO: 7.9 X10*3/UL (ref 4.4–11.3)

## 2024-02-05 PROCEDURE — 94640 AIRWAY INHALATION TREATMENT: CPT | Mod: MUE

## 2024-02-05 PROCEDURE — 2500000004 HC RX 250 GENERAL PHARMACY W/ HCPCS (ALT 636 FOR OP/ED): Performed by: INTERNAL MEDICINE

## 2024-02-05 PROCEDURE — 2500000002 HC RX 250 W HCPCS SELF ADMINISTERED DRUGS (ALT 637 FOR MEDICARE OP, ALT 636 FOR OP/ED): Performed by: INTERNAL MEDICINE

## 2024-02-05 PROCEDURE — 36415 COLL VENOUS BLD VENIPUNCTURE: CPT | Performed by: INTERNAL MEDICINE

## 2024-02-05 PROCEDURE — 99233 SBSQ HOSP IP/OBS HIGH 50: CPT

## 2024-02-05 PROCEDURE — 93306 TTE W/DOPPLER COMPLETE: CPT

## 2024-02-05 PROCEDURE — 82805 BLOOD GASES W/O2 SATURATION: CPT | Performed by: INTERNAL MEDICINE

## 2024-02-05 PROCEDURE — 2500000004 HC RX 250 GENERAL PHARMACY W/ HCPCS (ALT 636 FOR OP/ED)

## 2024-02-05 PROCEDURE — 2500000002 HC RX 250 W HCPCS SELF ADMINISTERED DRUGS (ALT 637 FOR MEDICARE OP, ALT 636 FOR OP/ED)

## 2024-02-05 PROCEDURE — 80069 RENAL FUNCTION PANEL: CPT | Performed by: FAMILY MEDICINE

## 2024-02-05 PROCEDURE — 2060000001 HC INTERMEDIATE ICU ROOM DAILY

## 2024-02-05 PROCEDURE — 2500000005 HC RX 250 GENERAL PHARMACY W/O HCPCS: Performed by: FAMILY MEDICINE

## 2024-02-05 PROCEDURE — 82947 ASSAY GLUCOSE BLOOD QUANT: CPT

## 2024-02-05 PROCEDURE — 99233 SBSQ HOSP IP/OBS HIGH 50: CPT | Performed by: FAMILY MEDICINE

## 2024-02-05 PROCEDURE — 2500000001 HC RX 250 WO HCPCS SELF ADMINISTERED DRUGS (ALT 637 FOR MEDICARE OP)

## 2024-02-05 PROCEDURE — 85027 COMPLETE CBC AUTOMATED: CPT | Performed by: FAMILY MEDICINE

## 2024-02-05 PROCEDURE — 36415 COLL VENOUS BLD VENIPUNCTURE: CPT | Performed by: FAMILY MEDICINE

## 2024-02-05 RX ORDER — PREDNISONE 20 MG/1
20 TABLET ORAL DAILY
Status: COMPLETED | OUTPATIENT
Start: 2024-02-06 | End: 2024-02-08

## 2024-02-05 RX ADMIN — INSULIN LISPRO 9 UNITS: 100 INJECTION, SOLUTION INTRAVENOUS; SUBCUTANEOUS at 17:05

## 2024-02-05 RX ADMIN — METOPROLOL SUCCINATE 50 MG: 50 TABLET, EXTENDED RELEASE ORAL at 20:50

## 2024-02-05 RX ADMIN — LEVOTHYROXINE SODIUM 50 MCG: 50 TABLET ORAL at 05:36

## 2024-02-05 RX ADMIN — Medication 1 L/MIN: at 08:32

## 2024-02-05 RX ADMIN — METHYLPREDNISOLONE 32 MG: 4 TABLET ORAL at 08:34

## 2024-02-05 RX ADMIN — AMLODIPINE BESYLATE 5 MG: 5 TABLET ORAL at 08:34

## 2024-02-05 RX ADMIN — ATORVASTATIN CALCIUM 40 MG: 40 TABLET, FILM COATED ORAL at 08:34

## 2024-02-05 RX ADMIN — IPRATROPIUM BROMIDE AND ALBUTEROL SULFATE 3 ML: 2.5; .5 SOLUTION RESPIRATORY (INHALATION) at 20:02

## 2024-02-05 RX ADMIN — ASPIRIN 81 MG: 81 TABLET, COATED ORAL at 08:34

## 2024-02-05 RX ADMIN — IPRATROPIUM BROMIDE AND ALBUTEROL SULFATE 3 ML: 2.5; .5 SOLUTION RESPIRATORY (INHALATION) at 08:32

## 2024-02-05 RX ADMIN — FUROSEMIDE 40 MG: 10 INJECTION, SOLUTION INTRAMUSCULAR; INTRAVENOUS at 20:50

## 2024-02-05 RX ADMIN — CLOPIDOGREL 75 MG: 75 TABLET ORAL at 08:34

## 2024-02-05 RX ADMIN — PANTOPRAZOLE SODIUM 40 MG: 40 TABLET, DELAYED RELEASE ORAL at 08:34

## 2024-02-05 RX ADMIN — HEPARIN SODIUM 5000 UNITS: 5000 INJECTION INTRAVENOUS; SUBCUTANEOUS at 20:50

## 2024-02-05 RX ADMIN — Medication 2 L/MIN: at 10:00

## 2024-02-05 RX ADMIN — AMIODARONE HYDROCHLORIDE 200 MG: 200 TABLET ORAL at 08:34

## 2024-02-05 RX ADMIN — INSULIN LISPRO 3 UNITS: 100 INJECTION, SOLUTION INTRAVENOUS; SUBCUTANEOUS at 08:36

## 2024-02-05 RX ADMIN — INSULIN GLARGINE 14 UNITS: 100 INJECTION, SOLUTION SUBCUTANEOUS at 20:50

## 2024-02-05 RX ADMIN — PANTOPRAZOLE SODIUM 40 MG: 40 TABLET, DELAYED RELEASE ORAL at 20:50

## 2024-02-05 RX ADMIN — HEPARIN SODIUM 5000 UNITS: 5000 INJECTION INTRAVENOUS; SUBCUTANEOUS at 11:47

## 2024-02-05 RX ADMIN — FUROSEMIDE 40 MG: 10 INJECTION, SOLUTION INTRAMUSCULAR; INTRAVENOUS at 08:34

## 2024-02-05 RX ADMIN — VENLAFAXINE HYDROCHLORIDE 75 MG: 75 CAPSULE, EXTENDED RELEASE ORAL at 08:34

## 2024-02-05 RX ADMIN — FERROUS GLUCONATE 324 MG: 324 TABLET ORAL at 08:34

## 2024-02-05 RX ADMIN — ACETAMINOPHEN 650 MG: 325 TABLET ORAL at 11:47

## 2024-02-05 RX ADMIN — HEPARIN SODIUM 5000 UNITS: 5000 INJECTION INTRAVENOUS; SUBCUTANEOUS at 05:35

## 2024-02-05 RX ADMIN — LEVOTHYROXINE SODIUM 200 MCG: 100 TABLET ORAL at 05:35

## 2024-02-05 RX ADMIN — IPRATROPIUM BROMIDE AND ALBUTEROL SULFATE 3 ML: 2.5; .5 SOLUTION RESPIRATORY (INHALATION) at 15:11

## 2024-02-05 ASSESSMENT — COGNITIVE AND FUNCTIONAL STATUS - GENERAL
TURNING FROM BACK TO SIDE WHILE IN FLAT BAD: A LOT
PERSONAL GROOMING: A LITTLE
DAILY ACTIVITIY SCORE: 13
MOVING FROM LYING ON BACK TO SITTING ON SIDE OF FLAT BED WITH BEDRAILS: A LOT
STANDING UP FROM CHAIR USING ARMS: A LOT
MOVING FROM LYING ON BACK TO SITTING ON SIDE OF FLAT BED WITH BEDRAILS: A LOT
WALKING IN HOSPITAL ROOM: A LOT
PERSONAL GROOMING: A LITTLE
CLIMB 3 TO 5 STEPS WITH RAILING: A LOT
HELP NEEDED FOR BATHING: A LOT
EATING MEALS: A LITTLE
WALKING IN HOSPITAL ROOM: A LOT
HELP NEEDED FOR BATHING: A LOT
MOVING TO AND FROM BED TO CHAIR: A LOT
MOBILITY SCORE: 12
DRESSING REGULAR UPPER BODY CLOTHING: A LITTLE
DRESSING REGULAR UPPER BODY CLOTHING: A LITTLE
DRESSING REGULAR LOWER BODY CLOTHING: TOTAL
TOILETING: TOTAL
DRESSING REGULAR LOWER BODY CLOTHING: TOTAL
TOILETING: TOTAL
MOVING TO AND FROM BED TO CHAIR: A LOT
DAILY ACTIVITIY SCORE: 13
TURNING FROM BACK TO SIDE WHILE IN FLAT BAD: A LOT
MOBILITY SCORE: 12
CLIMB 3 TO 5 STEPS WITH RAILING: A LOT
STANDING UP FROM CHAIR USING ARMS: A LOT
EATING MEALS: A LITTLE

## 2024-02-05 ASSESSMENT — PAIN SCALES - GENERAL
PAINLEVEL_OUTOF10: 0 - NO PAIN
PAINLEVEL_OUTOF10: 7

## 2024-02-05 ASSESSMENT — PAIN DESCRIPTION - LOCATION: LOCATION: HEAD

## 2024-02-05 NOTE — PROGRESS NOTES
"Daphne Morris is a 68 y.o. female on day 2 of admission presenting with CHF (congestive heart failure), NYHA class I, acute on chronic, combined (CMS/HCC).    Subjective   Feels better today,.  Less edema.       Objective     Physical Exam  Vitals reviewed.   Constitutional:       Appearance: Normal appearance.   HENT:      Head: Normocephalic and atraumatic.   Eyes:      Extraocular Movements: Extraocular movements intact.   Cardiovascular:      Rate and Rhythm: Normal rate and regular rhythm.      Heart sounds: Normal heart sounds.   Pulmonary:      Effort: Pulmonary effort is normal.      Comments: Coarse breath sounds bilaterally   Abdominal:      Palpations: Abdomen is soft.      Tenderness: There is no abdominal tenderness.   Musculoskeletal:      Cervical back: Normal range of motion.      Right lower le+ Pitting Edema present.      Left lower le+ Pitting Edema present.   Skin:     General: Skin is warm.   Neurological:      General: No focal deficit present.      Mental Status: She is alert and oriented to person, place, and time. Mental status is at baseline.   Psychiatric:         Mood and Affect: Mood normal.         Behavior: Behavior normal.         Last Recorded Vitals  Blood pressure 137/61, pulse 63, temperature 36.8 °C (98.2 °F), temperature source Temporal, resp. rate 20, height 1.676 m (5' 6\"), weight 103 kg (226 lb 3.1 oz), SpO2 96 %.  Intake/Output last 3 Shifts:  I/O last 3 completed shifts:  In: 1080 (10.5 mL/kg) [P.O.:1080]  Out: 755 (7.4 mL/kg) [Urine:755 (0.2 mL/kg/hr)]  Weight: 102.6 kg     Relevant Results  Scheduled medications  amiodarone, 200 mg, oral, Daily with breakfast  amLODIPine, 5 mg, oral, Daily  aspirin, 81 mg, oral, Daily  atorvastatin, 40 mg, oral, Daily  clopidogrel, 75 mg, oral, Daily  ferrous gluconate, 324 mg, oral, Daily with breakfast  furosemide, 40 mg, intravenous, q12h  heparin (porcine), 5,000 Units, subcutaneous, q8h  insulin glargine, 14 Units, " subcutaneous, Nightly  insulin lispro, 0-15 Units, subcutaneous, TID with meals  ipratropium-albuteroL, 3 mL, nebulization, TID  levothyroxine, 200 mcg, oral, Daily before breakfast  levothyroxine, 50 mcg, oral, Daily before breakfast  [START ON 2/5/2024] methylPREDNISolone, 32 mg, oral, Daily  metoprolol succinate XL, 50 mg, oral, BID  pantoprazole, 40 mg, oral, BID  [Held by provider] potassium chloride CR, 20 mEq, oral, Daily  [Held by provider] SITagliptin phosphate, 25 mg, oral, Daily  venlafaxine XR, 75 mg, oral, Daily      Continuous medications     PRN medications  PRN medications: acetaminophen, dextrose 10 % in water (D10W), dextrose, glucagon, HYDROmorphone, ipratropium-albuteroL, oxygen, oxygen, oxygen    Results for orders placed or performed during the hospital encounter of 02/02/24 (from the past 24 hour(s))   Renal Function Panel   Result Value Ref Range    Glucose 189 (H) 74 - 99 mg/dL    Sodium 139 136 - 145 mmol/L    Potassium 5.0 3.5 - 5.3 mmol/L    Chloride 104 98 - 107 mmol/L    Bicarbonate 26 21 - 32 mmol/L    Anion Gap 14 10 - 20 mmol/L    Urea Nitrogen 56 (H) 6 - 23 mg/dL    Creatinine 3.49 (H) 0.50 - 1.05 mg/dL    eGFR 14 (L) >60 mL/min/1.73m*2    Calcium 7.3 (L) 8.6 - 10.3 mg/dL    Phosphorus 6.0 (H) 2.5 - 4.9 mg/dL    Albumin 2.2 (L) 3.4 - 5.0 g/dL   CBC   Result Value Ref Range    WBC 7.4 4.4 - 11.3 x10*3/uL    nRBC 0.0 0.0 - 0.0 /100 WBCs    RBC 2.76 (L) 4.00 - 5.20 x10*6/uL    Hemoglobin 8.2 (L) 12.0 - 16.0 g/dL    Hematocrit 27.9 (L) 36.0 - 46.0 %     (H) 80 - 100 fL    MCH 29.7 26.0 - 34.0 pg    MCHC 29.4 (L) 32.0 - 36.0 g/dL    RDW 14.4 11.5 - 14.5 %    Platelets 279 150 - 450 x10*3/uL   Blood Gas Venous   Result Value Ref Range    POCT pH, Venous 7.30 (L) 7.33 - 7.43 pH    POCT pCO2, Venous 57 (H) 41 - 51 mm Hg    POCT pO2, Venous 52 (H) 35 - 45 mm Hg    POCT SO2, Venous 84 (H) 45 - 75 %    POCT Oxy Hemoglobin, Venous 82.6 (H) 45.0 - 75.0 %    POCT Base Excess, Venous 0.4  -2.0 - 3.0 mmol/L    POCT HCO3 Calculated, Venous 28.0 (H) 22.0 - 26.0 mmol/L    Patient Temperature      FiO2 28 %   POCT GLUCOSE   Result Value Ref Range    POCT Glucose 117 (H) 74 - 99 mg/dL   POCT GLUCOSE   Result Value Ref Range    POCT Glucose 170 (H) 74 - 99 mg/dL   POCT GLUCOSE   Result Value Ref Range    POCT Glucose 230 (H) 74 - 99 mg/dL   POCT GLUCOSE   Result Value Ref Range    POCT Glucose 274 (H) 74 - 99 mg/dL             Assessment/Plan   Principal Problem:    CHF (congestive heart failure), NYHA class I, acute on chronic, combined (CMS/[facility])    69 yo woman w h/o afib, COPD, chronic diastolic heart failure admitted w/ acute hypoxic resp failure      Acute hypoxic and hypercapnic resp failure 2/2 acute on chronic diastolic heart failure, COPD exacerbation, possible PNA - Improved on 2L NC.  Cont  BIPAP tonight.  Repeat blood gas in am.  Trial off BIPAP tomorrow night if overall improved.  Cont diuresis.       COPD exacerbation - on methylprednisolone.  Cont nebs and inhalers      Pleural effusion - s/p thora w/ 2.3L drained.  Fluid sent for anaylsis     Michele Larios MD

## 2024-02-05 NOTE — PROGRESS NOTES
Daphne Morris is a 68 y.o. female on day 3 of admission presenting with CHF (congestive heart failure), NYHA class I, acute on chronic, combined (CMS/HCC).      Subjective   No acute issues overnight       Objective     Last Recorded Vitals  /57 (BP Location: Right arm, Patient Position: Lying)   Pulse 60   Temp 36.5 °C (97.7 °F) (Temporal)   Resp 18   Wt 103 kg (226 lb 8 oz)   SpO2 96%   Intake/Output last 3 Shifts:    Intake/Output Summary (Last 24 hours) at 2/5/2024 1351  Last data filed at 2/5/2024 1305  Gross per 24 hour   Intake 630 ml   Output 1005 ml   Net -375 ml         Admission Weight  Weight: 99.8 kg (220 lb 0.3 oz) (02/02/24 0404)    Daily Weight  02/05/24 : 103 kg (226 lb 8 oz)    Image Results  XR knee left 4+ views  Narrative: Interpreted By:  Adama Jonas,   STUDY:  Left knee dated  2/2/2024.      INDICATION:  Signs/Symptoms:left kne pain      COMPARISON:  Radiographs dated 10/23/2018.      ACCESSION NUMBER(S):  [id number]      ORDERING CLINICIAN:  JOSUÉ REICH      TECHNIQUE:  Four views of the left knee.      FINDINGS:  No fracture or dislocation is evident.  There is a small knee joint  effusion. There is moderate tricompartmental degenerative change of  the knee. Reticular increased density is seen in the subcutaneous  tissues.      Impression: 1. Small joint effusion and degenerative change of the knee without  osseous injury evident. Knowledge of any trauma may be helpful. If  there is persistent concern for osseous injury, cross-sectional  imaging can be considered.  2. Reticular increased density in the subcutaneous tissues which may  represent lymphedema and/or cellulitis.      MACRO:  None      Signed by: Adama Jonas 2/2/2024 4:24 PM  Dictation workstation:   [id number]  XR chest 1 view  Narrative: Interpreted By:  Rayna Carson,   STUDY:  XR CHEST 1 VIEW;  2/2/2024 2:29 pm      INDICATION:  Signs/Symptoms:S/p thoracentesis.      COMPARISON:  02/01/2024       ACCESSION NUMBER(S):  [id number]      ORDERING CLINICIAN:  AIDAN HERNANDEZ      TECHNIQUE:  Portable AP upright      FINDINGS:  The cardiac silhouette is enlarged but unchanged. Aorta is  atherosclerotic. There is marked improvement in the right pleural  effusion since the prior study as a result of interval thoracentesis.  No pneumothorax is present. There is minimal fluid residual blunting  the costophrenic angle. There is minimal atelectasis at the right  lung base. Pulmonary vasculature appears congested. No interval  change is noted in the osseous structures.      Impression: Marked interval improvement in the right pleural effusion with  interval thoracentesis. No pneumothorax.      Pulmonary vascular congestion      Minimal atelectasis right lung base      MACRO:  None.      Signed by: Rayna Carson 2/2/2024 3:23 PM  Dictation workstation:   [id number]  US renal complete  Narrative: Interpreted By:  Amando Starks,   STUDY:  US RENAL COMPLETE; 2/2/2024 12:23 pm      INDICATION:  Signs/Symptoms:ANDREA on CKD.      COMPARISON:  Renal ultrasound 05/01/2023      ACCESSION NUMBER(S):  [id number]      ORDERING CLINICIAN:  JESSICA EPPS      TECHNIQUE:  Sonography of the kidneys and urinary bladder was performed.      FINDINGS:  Right Kidney: Right total nephrectomy.      Left Kidney:  *Renal length: 10.6 cm  *Parenchyma: Normal parenchymal echogenicity. Normal parenchymal  thickness. *Collecting system: No hydronephrosis.  *Calculus: No echogenic, shadowing calculus.  *Lesion: None.      Bladder: Normal sonographic appearance.      Impression: No left hydronephrosis. Right total nephrectomy.      MACRO:  None      Signed by: Amando Starks 2/2/2024 2:36 PM  Dictation workstation:   [id number]      Physical Exam  Constitutional:       Appearance: Normal appearance.   HENT:      Head: Normocephalic and atraumatic.      Mouth/Throat:      Mouth: Mucous membranes are moist.   Eyes:      Extraocular  Movements: Extraocular movements intact.      Pupils: Pupils are equal, round, and reactive to light.   Cardiovascular:      Rate and Rhythm: Normal rate and regular rhythm.      Pulses: Normal pulses.      Heart sounds: Normal heart sounds.   Pulmonary:      Effort: Pulmonary effort is normal.   Abdominal:      General: Abdomen is flat.      Palpations: Abdomen is soft.   Musculoskeletal:         General: Normal range of motion.      Cervical back: Normal range of motion.   Skin:     General: Skin is warm.      Capillary Refill: Capillary refill takes less than 2 seconds.   Neurological:      Mental Status: She is alert.             Assessment/Plan       Daphne Morris is a 68 y.o. female presenting with past medical history of heart failure, atrial fibrillation, Hypertension, type 2 DM transfer from [facility] due to acute hypoxic respiratory failure secondary to pneumonia, COPD exacerbation and acute diastolic heart failure.     Acute hypoxic respiratory failure secondary to pneumonia, COPD exacerbation, pleural effusion and acute diastolic heart failure.  Echocardiogram 8/2023 revealed LVEF of 55% with mildly dilated left atrium.  S/p thoracentesis with resolution of pleural effusion. 2.3L drained sent for cytology and chemistry, appears transudative  Continue iv Lasix 40 mg twice daily  Procalcitonin 0.08 Dissontinue ceftriaxone and azithromycin  Pulmonology recommend transition from Solu-Medrol to prednisone 20 mg daily  Cardiology considering Lasix drip, and also advised there is plans for a left and right heart cath as an outpatient on January 17 but was unable to lay flat, considering to do this while inpatient   and pulmonology following  Continue monitor intake and output, net output of 2.800cc since admission / including thoracentesis       Acute Kidney Injury on stage III chronic kidney disease  Pre renal in nature in the setting of acute decompensated Heart Failure  Renal ultrasound reveals right total  nephrectomy, with no left-sided hydronephrosis  Diurese as tolerated  Nephrology following  Neurology also recommended starting SGLT 2 inhibitor as an outpatient if GFR improves to greater than 25     Elevated troponin/demand ischemia  Continue aspirin, Plavix, statin, metoprolol  Cardiology advised for possible left heart cath next week if her respiratory status improves    Left knee pain  Patient fell and was seen in the emergency room about a week ago  Diagnosed with skin tear  X-ray revealed small joint effusion, degenerative changes/no fracture     Paroxysmal atrial fibrillation  Status post watchman in August 2023  Currently in sinus rhythm  Continue amiodarone and metoprolol     Coronary artery disease  Continue, aspirin, Plavix, metoprolol     Type 2 diabetes mellitus  Continue Lantus and insulin sliding scale  Holding Jardiance  Continue diabetic diet  Follow Accu-Cheks     Hypertension  2 g sodium diet  Continue medications  Monitor vitals    GERD  Continue PPI     Hyperlipidemia  Continue atorvastatin     Depression  Continue venlafaxine     DVT prophylaxis  Heparin    Disposition  Pending cardiology's recommendations on left and right heart cath as an inpatient.  Respiratory status has improved and patient is currently on room air             Luis Moffett DO

## 2024-02-05 NOTE — PROGRESS NOTES
Overnight events  Ovenight, patient did not use bipap and was tolerating 1L NC.    Subjective    Daphne Morris is a 68 y.o. female presenting for acute hypoxic respiratory failure. Has history of COPD, heart failure, CAD, atrial fibrillation, DM2, and CKD. Patient's breathing is much better today. Continues to tolerate 1L NC. No new concerns.    Objective    Physical Exam  Constitutional:       Appearance: Normal appearance. She is obese.   HENT:      Head: Normocephalic and atraumatic.      Nose: Nose normal.      Mouth/Throat:      Mouth: Mucous membranes are moist.      Pharynx: Oropharynx is clear.   Eyes:      Extraocular Movements: Extraocular movements intact.      Conjunctiva/sclera: Conjunctivae normal.      Pupils: Pupils are equal, round, and reactive to light.   Cardiovascular:      Rate and Rhythm: Normal rate. Rhythm irregular.      Pulses: Normal pulses.      Heart sounds: Normal heart sounds.   Pulmonary:      Effort: Pulmonary effort is normal. No respiratory distress.      Breath sounds: Normal breath sounds. No wheezing, rhonchi or rales.   Abdominal:      General: Abdomen is flat. Bowel sounds are normal. There is no distension.      Palpations: Abdomen is soft.      Tenderness: There is no abdominal tenderness. There is no guarding or rebound.   Musculoskeletal:         General: Normal range of motion.      Right lower leg: Edema present.      Left lower leg: Edema present.   Skin:     General: Skin is warm and dry.      Capillary Refill: Capillary refill takes less than 2 seconds.   Neurological:      General: No focal deficit present.      Mental Status: She is alert and oriented to person, place, and time.      Temp:  [36.5 °C (97.7 °F)-36.8 °C (98.2 °F)] 36.5 °C (97.7 °F)  Heart Rate:  [40-63] 40  Resp:  [17-23] 21  BP: (122-147)/(57-65) 122/60    Vitals:    02/05/24 0532   Weight: 103 kg (226 lb 8 oz)     I/Os    Intake/Output Summary (Last 24 hours) at 2/5/2024 2168  Last data filed at  2/5/2024 1305  Gross per 24 hour   Intake 390 ml   Output 825 ml   Net -435 ml     Labs:   Results from last 72 hours   Lab Units 02/05/24  0555 02/04/24  0505 02/03/24  0508   SODIUM mmol/L 139 139 135*   POTASSIUM mmol/L 4.5 5.0 5.0   CHLORIDE mmol/L 103 104 101   CO2 mmol/L 28 26 30   BUN mg/dL 61* 56* 50*   CREATININE mg/dL 3.67* 3.49* 3.28*   GLUCOSE mg/dL 181* 189* 190*   CALCIUM mg/dL 7.6* 7.3* 7.2*   ANION GAP mmol/L 13 14 9*   EGFR mL/min/1.73m*2 13* 14* 15*   PHOSPHORUS mg/dL 6.1* 6.0* 5.8*      Results from last 72 hours   Lab Units 02/05/24  0555 02/04/24  0505 02/03/24  0508   WBC AUTO x10*3/uL 7.9 7.4 4.1*   HEMOGLOBIN g/dL 8.2* 8.2* 8.3*   HEMATOCRIT % 27.8* 27.9* 27.8*   PLATELETS AUTO x10*3/uL 248 279 270      Lab Results   Component Value Date    CALCIUM 7.6 (L) 02/05/2024    PHOS 6.1 (H) 02/05/2024      Lab Results   Component Value Date    CRP 8.9 (H) 05/06/2021      Micro/ID:     Results from last 7 days   Lab Units 02/02/24  1349   FUNGAL CULTURE/SM, MISC  Culture in progress, a report will be issued when positive or after 2 weeks of incubation.   FUNGAL STAIN  No fungal elements seen      Lab Results   Component Value Date    URINECULTURE NO SIGNIFICANT GROWTH. 07/19/2023     Images:  XR knee left 4+ views  Narrative: Interpreted By:  Adama Jonas,   STUDY:  Left knee dated  2/2/2024.      INDICATION:  Signs/Symptoms:left kne pain      COMPARISON:  Radiographs dated 10/23/2018.      ACCESSION NUMBER(S):  [id number]      ORDERING CLINICIAN:  JOSUÉ REICH      TECHNIQUE:  Four views of the left knee.      FINDINGS:  No fracture or dislocation is evident.  There is a small knee joint  effusion. There is moderate tricompartmental degenerative change of  the knee. Reticular increased density is seen in the subcutaneous  tissues.      Impression: 1. Small joint effusion and degenerative change of the knee without  osseous injury evident. Knowledge of any trauma may be helpful. If  there is  persistent concern for osseous injury, cross-sectional  imaging can be considered.  2. Reticular increased density in the subcutaneous tissues which may  represent lymphedema and/or cellulitis.      MACRO:  None      Signed by: Adama Jonas 2/2/2024 4:24 PM  Dictation workstation:   [id number]  XR chest 1 view  Narrative: Interpreted By:  Rayna Carson,   STUDY:  XR CHEST 1 VIEW;  2/2/2024 2:29 pm      INDICATION:  Signs/Symptoms:S/p thoracentesis.      COMPARISON:  02/01/2024      ACCESSION NUMBER(S):  [id number]      ORDERING CLINICIAN:  AIDAN HERNANDEZ      TECHNIQUE:  Portable AP upright      FINDINGS:  The cardiac silhouette is enlarged but unchanged. Aorta is  atherosclerotic. There is marked improvement in the right pleural  effusion since the prior study as a result of interval thoracentesis.  No pneumothorax is present. There is minimal fluid residual blunting  the costophrenic angle. There is minimal atelectasis at the right  lung base. Pulmonary vasculature appears congested. No interval  change is noted in the osseous structures.      Impression: Marked interval improvement in the right pleural effusion with  interval thoracentesis. No pneumothorax.      Pulmonary vascular congestion      Minimal atelectasis right lung base      MACRO:  None.      Signed by: Rayna Carson 2/2/2024 3:23 PM  Dictation workstation:   [id number]  US renal complete  Narrative: Interpreted By:  Amando Starks,   STUDY:  US RENAL COMPLETE; 2/2/2024 12:23 pm      INDICATION:  Signs/Symptoms:ANDREA on CKD.      COMPARISON:  Renal ultrasound 05/01/2023      ACCESSION NUMBER(S):  [id number]      ORDERING CLINICIAN:  JESSICA EPPS      TECHNIQUE:  Sonography of the kidneys and urinary bladder was performed.      FINDINGS:  Right Kidney: Right total nephrectomy.      Left Kidney:  *Renal length: 10.6 cm  *Parenchyma: Normal parenchymal echogenicity. Normal parenchymal  thickness. *Collecting system: No  hydronephrosis.  *Calculus: No echogenic, shadowing calculus.  *Lesion: None.      Bladder: Normal sonographic appearance.      Impression: No left hydronephrosis. Right total nephrectomy.      MACRO:  None      Signed by: Amando Starks 2/2/2024 2:36 PM  Dictation workstation:   [id number]       Meds:  Scheduled medications  amiodarone, 200 mg, oral, Daily with breakfast  amLODIPine, 5 mg, oral, Daily  aspirin, 81 mg, oral, Daily  atorvastatin, 40 mg, oral, Daily  clopidogrel, 75 mg, oral, Daily  ferrous gluconate, 324 mg, oral, Daily with breakfast  furosemide, 40 mg, intravenous, q12h  heparin (porcine), 5,000 Units, subcutaneous, q8h  insulin glargine, 14 Units, subcutaneous, Nightly  insulin lispro, 0-15 Units, subcutaneous, TID with meals  ipratropium-albuteroL, 3 mL, nebulization, TID  levothyroxine, 200 mcg, oral, Daily before breakfast  levothyroxine, 50 mcg, oral, Daily before breakfast  metoprolol succinate XL, 50 mg, oral, BID  pantoprazole, 40 mg, oral, BID  [Held by provider] potassium chloride CR, 20 mEq, oral, Daily  [START ON 2/6/2024] predniSONE, 20 mg, oral, Daily  [Held by provider] SITagliptin phosphate, 25 mg, oral, Daily  venlafaxine XR, 75 mg, oral, Daily    PRN medications  PRN medications: acetaminophen, dextrose 10 % in water (D10W), dextrose, glucagon, HYDROmorphone, ipratropium-albuteroL, oxygen, oxygen, oxygen     Assessment    Daphne Morris is a 68 y.o. female presenting for acute hypoxic respiratory failure. Has history of COPD, heart failure, CAD, atrial fibrillation, DM2, and CKD. Patient had large pleural effusion likely 2/2 CHF exacerbation s/p thoracentesis where 2.3L were drained. Follow up x-ray looks stable. Patient tolerating 1L well, does not require bipap, appears to be significantly improved.    Recommendations:  - Wean O2 as tolerated  - Target saturation 88-92%  - Transudative pleural effusion likely 2/2 CHF exacerbation  - Follow up cardiology outpatient  -  Incentive spirometry  - Consider walking pulse ox before discharge  - Continue diuresis if renal function permits  - Pulmonology will sign off at this time

## 2024-02-05 NOTE — PROGRESS NOTES
02/05/24 1314   Discharge Planning   Living Arrangements Alone   Support Systems Friends/neighbors   Assistance Needed Patient is alert and oriented x3, independent in ADLs but reports she struggles to get off the toilet sometimes even though she has a raised toilet seat, reports using a rollator at all times, 02 @ 3L HS   Type of Residence Private residence   Number of Stairs to Enter Residence 1   Number of Stairs Within Residence 4   Type of Animals or Pets 1 cat inside and 1 cat outside   Who is requesting discharge planning? Provider   Home or Post Acute Services In home services   Type of Home Care Services Home nursing visits;Home OT;Home PT   Patient expects to be discharged to: home alone,   with [facility]   Does the patient need discharge transport arranged? No   RoundTrip coordination needed? No   Has discharge transport been arranged? No   Patient Choice   Provider Choice list and CMS website (https://medicare.gov/care-compare#search) for post-acute Quality and Resource Measure Data were provided and reviewed with: Patient   Patient / Family choosing to utilize agency / facility established prior to hospitalization No     2/5/24 @ 1322: PT/ OT recommending SNF. Discussed with patient who if refusing SNF. Agreeable to HHC. Choices provided. [facility] to [facility] accept. [facility] willing to accept. Will need provider to send external referral for HHC. Patient has 4 steps to get into mobile home, feels she will be able to do this with assist of friend. Friend gets groceries for patient and helps patient get to appointments. Patient is on 3 L HS only thru [facility]. Poly Diaz NP visits patient at home thru [facility]. Will follow. Discussed in interdisciplinary rounds.     2/6/24 @ 1350: Patient continues to refuse SNF. Plan remains to go home with new [facility]. Provider to send external referral for HHC. Patient on 1.5L  here, and 3 L at HS only baseline. Patient reports her friend will transport her home. Plan for heart cath tomorrow per provider. Will follow. Discussed in interdisciplinary rounds.     2/14/24 @ 1543: Patient now agreeable to SNF. [facility]erral made to [facility]. Facility willing to accept. CNC submitted for direct precert that was obtained today. ADOD tomorrow. Will follow. Discussed in interdisciplinary rounds.     2/15/24 @ 1326: Patient medically ready for discharge today to [facility]. Orders are complete. CNC attached orders via [facility]. Transport confirmed for 1830 pickup. Patient, friend ( Dogu Feliciano), RN, facility notified of discharge time. Number for nurse to nurse report provided to BEATRICE Moncada.  Will follow. Discussed in interdisciplinary rounds.

## 2024-02-05 NOTE — CARE PLAN
Problem: Pain  Goal: My pain/discomfort is manageable  Outcome: Progressing     Problem: Safety  Goal: Patient will be injury free during hospitalization  Outcome: Progressing  Goal: I will remain free of falls  Outcome: Progressing     Problem: Daily Care  Goal: Daily care needs are met  Outcome: Progressing   The patient's goals for the shift include      The clinical goals for the shift include pt will be safe and free of injury through night

## 2024-02-05 NOTE — PROGRESS NOTES
ANDREA - at this point likely progressive CKD d/t cardiorenal  CKD 4   Hypertensive heart and kidney disease  HTN with controlled BP  Diabetic kidney disease with severe proteinuria  Acute CHF    - As above. I do not expect significant improvement in her Cr  - Renal prognosis is poor  - Suggest transition to PO diuretics  - Pt needs cont monitoring given risk of worsening renal dysfunction    New to me. Here for ADHF. Has advanced CKD with sCR in the 2.5-2.7 PTA. Here was 3.5 on admit and up to 3.7 now. Has severe proteinuria. On IV Lasix. BP okay. On NC.     Exam unremarkable

## 2024-02-06 LAB
ALBUMIN SERPL BCP-MCNC: 2.5 G/DL (ref 3.4–5)
ANION GAP SERPL CALC-SCNC: 17 MMOL/L (ref 10–20)
BACTERIA FLD CULT: NORMAL
BUN SERPL-MCNC: 68 MG/DL (ref 6–23)
CALCIUM SERPL-MCNC: 7.5 MG/DL (ref 8.6–10.3)
CHLORIDE SERPL-SCNC: 101 MMOL/L (ref 98–107)
CO2 SERPL-SCNC: 26 MMOL/L (ref 21–32)
CREAT SERPL-MCNC: 3.67 MG/DL (ref 0.5–1.05)
EGFRCR SERPLBLD CKD-EPI 2021: 13 ML/MIN/1.73M*2
ERYTHROCYTE [DISTWIDTH] IN BLOOD BY AUTOMATED COUNT: 14.3 % (ref 11.5–14.5)
GLUCOSE BLD MANUAL STRIP-MCNC: 143 MG/DL (ref 74–99)
GLUCOSE BLD MANUAL STRIP-MCNC: 186 MG/DL (ref 74–99)
GLUCOSE BLD MANUAL STRIP-MCNC: 198 MG/DL (ref 74–99)
GLUCOSE BLD MANUAL STRIP-MCNC: 224 MG/DL (ref 74–99)
GLUCOSE SERPL-MCNC: 217 MG/DL (ref 74–99)
GRAM STN SPEC: NORMAL
GRAM STN SPEC: NORMAL
HCT VFR BLD AUTO: 29.5 % (ref 36–46)
HGB BLD-MCNC: 8.6 G/DL (ref 12–16)
MCH RBC QN AUTO: 30.2 PG (ref 26–34)
MCHC RBC AUTO-ENTMCNC: 29.2 G/DL (ref 32–36)
MCV RBC AUTO: 104 FL (ref 80–100)
NRBC BLD-RTO: 0 /100 WBCS (ref 0–0)
PHOSPHATE SERPL-MCNC: 6.3 MG/DL (ref 2.5–4.9)
PLATELET # BLD AUTO: 236 X10*3/UL (ref 150–450)
POTASSIUM SERPL-SCNC: 4.8 MMOL/L (ref 3.5–5.3)
RBC # BLD AUTO: 2.85 X10*6/UL (ref 4–5.2)
SODIUM SERPL-SCNC: 139 MMOL/L (ref 136–145)
WBC # BLD AUTO: 8.5 X10*3/UL (ref 4.4–11.3)

## 2024-02-06 PROCEDURE — 94640 AIRWAY INHALATION TREATMENT: CPT | Mod: MUE

## 2024-02-06 PROCEDURE — 84100 ASSAY OF PHOSPHORUS: CPT | Performed by: FAMILY MEDICINE

## 2024-02-06 PROCEDURE — 2500000004 HC RX 250 GENERAL PHARMACY W/ HCPCS (ALT 636 FOR OP/ED)

## 2024-02-06 PROCEDURE — 97110 THERAPEUTIC EXERCISES: CPT | Mod: GP

## 2024-02-06 PROCEDURE — 97535 SELF CARE MNGMENT TRAINING: CPT | Mod: GO,CO

## 2024-02-06 PROCEDURE — 2060000001 HC INTERMEDIATE ICU ROOM DAILY

## 2024-02-06 PROCEDURE — 2500000002 HC RX 250 W HCPCS SELF ADMINISTERED DRUGS (ALT 637 FOR MEDICARE OP, ALT 636 FOR OP/ED): Performed by: INTERNAL MEDICINE

## 2024-02-06 PROCEDURE — 97530 THERAPEUTIC ACTIVITIES: CPT | Mod: GO,CO

## 2024-02-06 PROCEDURE — 2500000002 HC RX 250 W HCPCS SELF ADMINISTERED DRUGS (ALT 637 FOR MEDICARE OP, ALT 636 FOR OP/ED)

## 2024-02-06 PROCEDURE — 82947 ASSAY GLUCOSE BLOOD QUANT: CPT

## 2024-02-06 PROCEDURE — 2500000001 HC RX 250 WO HCPCS SELF ADMINISTERED DRUGS (ALT 637 FOR MEDICARE OP)

## 2024-02-06 PROCEDURE — 99233 SBSQ HOSP IP/OBS HIGH 50: CPT | Performed by: INTERNAL MEDICINE

## 2024-02-06 PROCEDURE — 85027 COMPLETE CBC AUTOMATED: CPT | Performed by: FAMILY MEDICINE

## 2024-02-06 PROCEDURE — 36415 COLL VENOUS BLD VENIPUNCTURE: CPT | Performed by: FAMILY MEDICINE

## 2024-02-06 RX ORDER — INSULIN GLARGINE 100 [IU]/ML
16 INJECTION, SOLUTION SUBCUTANEOUS NIGHTLY
Status: DISCONTINUED | OUTPATIENT
Start: 2024-02-06 | End: 2024-02-12

## 2024-02-06 RX ADMIN — METOPROLOL SUCCINATE 50 MG: 50 TABLET, EXTENDED RELEASE ORAL at 20:46

## 2024-02-06 RX ADMIN — IPRATROPIUM BROMIDE AND ALBUTEROL SULFATE 3 ML: 2.5; .5 SOLUTION RESPIRATORY (INHALATION) at 20:29

## 2024-02-06 RX ADMIN — INSULIN LISPRO 3 UNITS: 100 INJECTION, SOLUTION INTRAVENOUS; SUBCUTANEOUS at 08:42

## 2024-02-06 RX ADMIN — HEPARIN SODIUM 5000 UNITS: 5000 INJECTION INTRAVENOUS; SUBCUTANEOUS at 20:46

## 2024-02-06 RX ADMIN — PANTOPRAZOLE SODIUM 40 MG: 40 TABLET, DELAYED RELEASE ORAL at 20:46

## 2024-02-06 RX ADMIN — HEPARIN SODIUM 5000 UNITS: 5000 INJECTION INTRAVENOUS; SUBCUTANEOUS at 04:29

## 2024-02-06 RX ADMIN — IPRATROPIUM BROMIDE AND ALBUTEROL SULFATE 3 ML: 2.5; .5 SOLUTION RESPIRATORY (INHALATION) at 07:46

## 2024-02-06 RX ADMIN — HEPARIN SODIUM 5000 UNITS: 5000 INJECTION INTRAVENOUS; SUBCUTANEOUS at 12:55

## 2024-02-06 RX ADMIN — PREDNISONE 20 MG: 20 TABLET ORAL at 08:41

## 2024-02-06 RX ADMIN — AMIODARONE HYDROCHLORIDE 200 MG: 200 TABLET ORAL at 08:41

## 2024-02-06 RX ADMIN — FUROSEMIDE 40 MG: 10 INJECTION, SOLUTION INTRAMUSCULAR; INTRAVENOUS at 20:46

## 2024-02-06 RX ADMIN — ATORVASTATIN CALCIUM 40 MG: 40 TABLET, FILM COATED ORAL at 08:41

## 2024-02-06 RX ADMIN — METOPROLOL SUCCINATE 50 MG: 50 TABLET, EXTENDED RELEASE ORAL at 08:41

## 2024-02-06 RX ADMIN — VENLAFAXINE HYDROCHLORIDE 75 MG: 75 CAPSULE, EXTENDED RELEASE ORAL at 08:41

## 2024-02-06 RX ADMIN — INSULIN GLARGINE 16 UNITS: 100 INJECTION, SOLUTION SUBCUTANEOUS at 20:47

## 2024-02-06 RX ADMIN — LEVOTHYROXINE SODIUM 200 MCG: 100 TABLET ORAL at 05:25

## 2024-02-06 RX ADMIN — LEVOTHYROXINE SODIUM 50 MCG: 50 TABLET ORAL at 05:26

## 2024-02-06 RX ADMIN — PANTOPRAZOLE SODIUM 40 MG: 40 TABLET, DELAYED RELEASE ORAL at 08:41

## 2024-02-06 RX ADMIN — FUROSEMIDE 40 MG: 10 INJECTION, SOLUTION INTRAMUSCULAR; INTRAVENOUS at 08:41

## 2024-02-06 RX ADMIN — ASPIRIN 81 MG: 81 TABLET, COATED ORAL at 08:41

## 2024-02-06 RX ADMIN — CLOPIDOGREL 75 MG: 75 TABLET ORAL at 08:41

## 2024-02-06 RX ADMIN — FERROUS GLUCONATE 324 MG: 324 TABLET ORAL at 08:43

## 2024-02-06 RX ADMIN — AMLODIPINE BESYLATE 5 MG: 5 TABLET ORAL at 08:41

## 2024-02-06 RX ADMIN — INSULIN LISPRO 6 UNITS: 100 INJECTION, SOLUTION INTRAVENOUS; SUBCUTANEOUS at 17:20

## 2024-02-06 ASSESSMENT — COGNITIVE AND FUNCTIONAL STATUS - GENERAL
DRESSING REGULAR UPPER BODY CLOTHING: A LOT
TURNING FROM BACK TO SIDE WHILE IN FLAT BAD: A LOT
MOVING FROM LYING ON BACK TO SITTING ON SIDE OF FLAT BED WITH BEDRAILS: A LOT
PERSONAL GROOMING: A LITTLE
DAILY ACTIVITIY SCORE: 12
TURNING FROM BACK TO SIDE WHILE IN FLAT BAD: A LOT
CLIMB 3 TO 5 STEPS WITH RAILING: TOTAL
DRESSING REGULAR LOWER BODY CLOTHING: TOTAL
MOVING TO AND FROM BED TO CHAIR: A LOT
STANDING UP FROM CHAIR USING ARMS: A LOT
MOVING TO AND FROM BED TO CHAIR: A LOT
TOILETING: TOTAL
HELP NEEDED FOR BATHING: A LOT
EATING MEALS: A LITTLE
DAILY ACTIVITIY SCORE: 12
DRESSING REGULAR LOWER BODY CLOTHING: TOTAL
MOBILITY SCORE: 11
HELP NEEDED FOR BATHING: A LOT
TOILETING: TOTAL
CLIMB 3 TO 5 STEPS WITH RAILING: A LOT
PERSONAL GROOMING: A LITTLE
MOVING FROM LYING ON BACK TO SITTING ON SIDE OF FLAT BED WITH BEDRAILS: A LOT
DRESSING REGULAR LOWER BODY CLOTHING: TOTAL
MOBILITY SCORE: 11
MOVING FROM LYING ON BACK TO SITTING ON SIDE OF FLAT BED WITH BEDRAILS: A LOT
WALKING IN HOSPITAL ROOM: A LOT
CLIMB 3 TO 5 STEPS WITH RAILING: TOTAL
WALKING IN HOSPITAL ROOM: A LOT
STANDING UP FROM CHAIR USING ARMS: A LOT
DRESSING REGULAR UPPER BODY CLOTHING: A LOT
TURNING FROM BACK TO SIDE WHILE IN FLAT BAD: A LOT
EATING MEALS: A LITTLE
WALKING IN HOSPITAL ROOM: A LOT
MOVING TO AND FROM BED TO CHAIR: A LOT
MOBILITY SCORE: 12
STANDING UP FROM CHAIR USING ARMS: A LOT

## 2024-02-06 ASSESSMENT — ACTIVITIES OF DAILY LIVING (ADL): HOME_MANAGEMENT_TIME_ENTRY: 12

## 2024-02-06 ASSESSMENT — PAIN - FUNCTIONAL ASSESSMENT
PAIN_FUNCTIONAL_ASSESSMENT: 0-10

## 2024-02-06 ASSESSMENT — PAIN SCALES - GENERAL
PAINLEVEL_OUTOF10: 0 - NO PAIN

## 2024-02-06 NOTE — PROGRESS NOTES
Occupational Therapy    OT Treatment    Patient Name: Daphne Morris  MRN: [id number]  Today's Date: 2/6/2024  Time Calculation  Start Time: 1352  Stop Time: 1416  Time Calculation (min): 24 min       Assessment:  OT Assessment: Pt deconditioned; presents with decreased endurance, decreased ADL, decreased funcitonal mobility. Continued skilled OT recommended to maximize pt safety and independence to return to OF. Pt continues to reportvunderstanding the need for additional rehab but states she would still decline placement. Re-education provided on benefits of SNF vs HHC, pt insistent [facility] level of assist would be appropriate for her needs.  Prognosis: Good  Barriers to Discharge: None  Evaluation/Treatment Tolerance: Patient limited by fatigue  Medical Staff Made Aware: Yes  End of Session Communication: Bedside nurse  End of Session Patient Position: Bed, 3 rail up, Alarm off, caregiver present (Nurse completing LE dressing changes.)  OT Assessment Results: Decreased ADL status, Decreased upper extremity strength, Decreased endurance, Decreased functional mobility  Prognosis: Good  Barriers to Discharge: None  Evaluation/Treatment Tolerance: Patient limited by fatigue  Medical Staff Made Aware: Yes  Strengths: Ability to acquire knowledge, Insight into problems  Barriers to Participation: Comorbidities  Plan:  Treatment Interventions: ADL retraining, Functional transfer training, Endurance training, Patient/family training, Equipment evaluation/education, Compensatory technique education  OT Frequency: 3 times per week  OT Discharge Recommendations: Moderate intensity level of continued care  Equipment Recommended upon Discharge: Wheeled walker  OT Recommended Transfer Status: Assist of 1  OT - OK to Discharge: Yes (In accord with OT POC)  Treatment Interventions: ADL retraining, Functional transfer training, Endurance training, Patient/family training, Equipment evaluation/education, Compensatory technique  education    Subjective   Previous Visit Info:  OT Last Visit  OT Received On: 02/06/24  General:  General  Reason for Referral: 69 yo female referred to OT for CHF, general weakness, impaired ADL, impaired mobility  Referred By: Milton Fishman DO  Past Medical History Relevant to Rehab: heart failure, atrial fibrillation, Hypertension, type 2 DM  Prior to Session Communication: Bedside nurse (Pt approiate for treatment per nurse.)  Patient Position Received: Alarm off, caregiver present (nurse doing dressing changes requiring bed mobility)  Preferred Learning Style: verbal  General Comment: Pt pleasasnt and cooperative, easily engaged in activities, Weeping  B UE's.  Precautions:  Medical Precautions: Fall precautions  Vital Signs:  Stable over course of treatment     Pain:  Pain Assessment  Pain Assessment: 0-10  Pain Score: 0 - No pain    Objective       Activities of Daily Living: UE Dressing  UE Dressing Level of Assistance: Setup, Minimum assistance  UE Dressing Where Assessed: Edge of bed  UE Dressing Comments: Increased time on task required to accomodate UE edema    LE Dressing  LE Dressing: Yes  Sock Level of Assistance: Dependent  LE Dressing Where Assessed: Edge of bed  LE Dressing Comments: Assist required d/t leg wraps, swelling and edema.    Bed Mobility/Transfers: Bed Mobility  Bed Mobility: Yes  Bed Mobility 1  Bed Mobility 1: Supine to sitting, Sitting to supine  Level of Assistance 1: Moderate assistance  Bed Mobility Comments 1: Assist needed to return legs to bed surface and reposition in supine.  Bed Mobility 2  Bed Mobility  2: Rolling right, Rolling left  Level of Assistance 2: Minimum assistance  Bed Mobility Comments 2: Using draw and bed rails    Transfers  Transfer: Yes  Transfer 1  Transfer From 1: Bed to  Transfer to 1: Stand  Technique 1: Sit to stand, Stand to sit  Transfer Device 1: Walker  Transfer Level of Assistance 1: Moderate assistance  Trials/Comments 1: Side stepped x5 up right  side of bed prior to sitting with FWW.    Outcome Measures:[facility] Daily Activity  Putting on and taking off regular lower body clothing: Total  Bathing (including washing, rinsing, drying): A lot  Putting on and taking off regular upper body clothing: A lot  Toileting, which includes using toilet, bedpan or urinal: Total  Taking care of personal grooming such as brushing teeth: A little  Eating Meals: A little  Daily Activity - Total Score: 12    Education Documentation  Body Mechanics, taught by DAYO England at 2/6/2024  2:41 PM.  Learner: Patient  Readiness: Acceptance  Method: Explanation, Demonstration  Response: Verbalizes Understanding, Demonstrated Understanding, Needs Reinforcement  Comment: Including discharge recommendqations for skilled versus HHC follow-through    Precautions, taught by DAYO England at 2/6/2024  2:41 PM.  Learner: Patient  Readiness: Acceptance  Method: Explanation, Demonstration  Response: Verbalizes Understanding, Demonstrated Understanding, Needs Reinforcement  Comment: Including discharge recommendqations for skilled versus HHC follow-through    ADL Training, taught by DAYO England at 2/6/2024  2:41 PM.  Learner: Patient  Readiness: Acceptance  Method: Explanation, Demonstration  Response: Verbalizes Understanding, Demonstrated Understanding, Needs Reinforcement  Comment: Including discharge recommendqations for skilled versus HHC follow-through    Education Comments  Pt receptive to education and instruction in course of session.    Goals:  Encounter Problems       Encounter Problems (Active)       OT Goals       Pt will tolerate 10min stand during functional task completion with no more than 1 rest break in order to increase endurance for functional task completion.  (Progressing)       Start:  02/03/24    Expected End:  02/17/24            Pt will increase endurance to tolerate 15min of OOB activity with no more than 1 rest break in order to increase ability to engage  in ADL completion.  (Progressing)       Start:  02/03/24    Expected End:  02/17/24            Pt will demo increased functional mobility to tolerate tasks necessary to complete ADL routine.  (Progressing)       Start:  02/03/24    Expected End:  02/17/24            Pt will demo ADL routine and meaningful daily activities using modifications as needed  (Progressing)       Start:  02/03/24    Expected End:  02/17/24            Pt will demo and/or verbalize 2-3 energy conservation techniques to incorporate into functional mobility or ADL to improve performance and increase independence.. (Progressing)       Start:  02/03/24    Expected End:  02/17/24

## 2024-02-06 NOTE — PROGRESS NOTES
Daphne Morris is a 68 y.o. female on day 4 of admission presenting with CHF (congestive heart failure), NYHA class I, acute on chronic, combined (CMS/HCC).      Subjective   She is sitting up in the bed, states she is feeling much better. Still with significant fluid overload, arms now weeping.     Review of systems:  Constitutional: negative for fever, chills, or malaise  Neuro: negative for dizziness, headache, numbness, tingling  ENT: Negative for nasal congestion or sore throat  CV: negative for chest pain, palpitations  GI: negative for abd pain, nausea, vomiting or diarrhea  : negative for dysuria, frequency, or urgency  Musculoskeletal: Negative for weakness, myalgia, or arthralgia  Endocrine: Negative for polyuria or polydipsia         Objective   Constitutional: Well developed, awake/alert/oriented x3, no distress, alert and cooperative  Eyes: PERRL, EOMI, clear sclera  ENMT: mucous membranes moist, no apparent injury, no lesions seen  Head/Neck: Neck supple, no apparent injury, thyroid without mass or tenderness, No JVD, trachea midline, no bruits  Respiratory/Thorax: Patent airways, CTAB, diminished and crackles in bases, breath sounds with good chest expansion, thorax symmetric  Cardiovascular: Regular, rate and rhythm, no murmurs, 2+ equal pulses of the extremities, normal S 1and S 2  Gastrointestinal: Nondistended, soft, non-tender, no rebound tenderness or guarding, no masses palpable, no organomegaly, +BS, no bruits  Musculoskeletal: ROM intact, no joint swelling, normal strength  Extremities:  2+ pitting edema BUE and lower extremities  Neurological: alert and oriented x3, intact senses, motor, response and reflexes, normal strength  Lymphatic: No significant lymphadenopathy  Psychological: Appropriate mood and behavior  Skin: Warm and dry, no lesions, no rashes      Last Recorded Vitals  /66 (BP Location: Right arm, Patient Position: Lying)   Pulse 84   Temp 36.7 °C (98.1 °F) (Temporal)  "  Resp 17   Ht 1.676 m (5' 6\")   Wt 104 kg (228 lb 14.4 oz)   SpO2 99%   BMI 36.95 kg/m²     Intake/Output last 3 Shifts:  I/O last 3 completed shifts:  In: 570 (5.5 mL/kg) [P.O.:570]  Out: 1350 (13 mL/kg) [Urine:1350 (0.4 mL/kg/hr)]  Weight: 103.8 kg   I/O this shift:  In: 240 [P.O.:240]  Out: -     Relevant Results  Scheduled medications  amiodarone, 200 mg, oral, Daily with breakfast  amLODIPine, 5 mg, oral, Daily  aspirin, 81 mg, oral, Daily  atorvastatin, 40 mg, oral, Daily  clopidogrel, 75 mg, oral, Daily  ferrous gluconate, 324 mg, oral, Daily with breakfast  furosemide, 40 mg, intravenous, q12h  heparin (porcine), 5,000 Units, subcutaneous, q8h  insulin glargine, 16 Units, subcutaneous, Nightly  insulin lispro, 0-15 Units, subcutaneous, TID with meals  ipratropium-albuteroL, 3 mL, nebulization, TID  levothyroxine, 200 mcg, oral, Daily before breakfast  levothyroxine, 50 mcg, oral, Daily before breakfast  metoprolol succinate XL, 50 mg, oral, BID  pantoprazole, 40 mg, oral, BID  [Held by provider] potassium chloride CR, 20 mEq, oral, Daily  predniSONE, 20 mg, oral, Daily  [Held by provider] SITagliptin phosphate, 25 mg, oral, Daily  venlafaxine XR, 75 mg, oral, Daily      Continuous medications     PRN medications  PRN medications: acetaminophen, dextrose 10 % in water (D10W), dextrose, glucagon, HYDROmorphone, ipratropium-albuteroL, oxygen, oxygen, oxygen    Results for orders placed or performed during the hospital encounter of 02/02/24 (from the past 24 hour(s))   Transthoracic Echo (TTE) Complete   Result Value Ref Range    AV pk isai 1.88 m/s    AV mn grad 9.1 mmHg    LVOT diam 1.96 cm    LV biplane EF 63 %    MV avg E/e' ratio 22.09     MV E/A ratio 2.52     LA vol index A/L 37.9 ml/m2    Tricuspid annular plane systolic excursion 1.8 cm    RV free wall pk S' 13.00 cm/s    LVIDd 4.35 cm    RVSP 39.9 mmHg    Aortic Valve Area by Continuity of VTI 1.62 cm2    Aortic Valve Area by Continuity of Peak " Velocity 1.56 cm2    AV pk grad 14.1 mmHg    LV A4C EF 63.4    POCT GLUCOSE   Result Value Ref Range    POCT Glucose 295 (H) 74 - 99 mg/dL   POCT GLUCOSE   Result Value Ref Range    POCT Glucose 261 (H) 74 - 99 mg/dL   Renal Function Panel   Result Value Ref Range    Glucose 217 (H) 74 - 99 mg/dL    Sodium 139 136 - 145 mmol/L    Potassium 4.8 3.5 - 5.3 mmol/L    Chloride 101 98 - 107 mmol/L    Bicarbonate 26 21 - 32 mmol/L    Anion Gap 17 10 - 20 mmol/L    Urea Nitrogen 68 (H) 6 - 23 mg/dL    Creatinine 3.67 (H) 0.50 - 1.05 mg/dL    eGFR 13 (L) >60 mL/min/1.73m*2    Calcium 7.5 (L) 8.6 - 10.3 mg/dL    Phosphorus 6.3 (H) 2.5 - 4.9 mg/dL    Albumin 2.5 (L) 3.4 - 5.0 g/dL   CBC   Result Value Ref Range    WBC 8.5 4.4 - 11.3 x10*3/uL    nRBC 0.0 0.0 - 0.0 /100 WBCs    RBC 2.85 (L) 4.00 - 5.20 x10*6/uL    Hemoglobin 8.6 (L) 12.0 - 16.0 g/dL    Hematocrit 29.5 (L) 36.0 - 46.0 %     (H) 80 - 100 fL    MCH 30.2 26.0 - 34.0 pg    MCHC 29.2 (L) 32.0 - 36.0 g/dL    RDW 14.3 11.5 - 14.5 %    Platelets 236 150 - 450 x10*3/uL   POCT GLUCOSE   Result Value Ref Range    POCT Glucose 186 (H) 74 - 99 mg/dL   POCT GLUCOSE   Result Value Ref Range    POCT Glucose 143 (H) 74 - 99 mg/dL       Transthoracic Echo (TTE) Complete   Final Result      XR chest 1 view   Final Result   Marked interval improvement in the right pleural effusion with   interval thoracentesis. No pneumothorax.        Pulmonary vascular congestion        Minimal atelectasis right lung base        MACRO:   None.        Signed by: Rayna Carson 2/2/2024 3:23 PM   Dictation workstation:   [id number]      XR knee left 4+ views   Final Result   1. Small joint effusion and degenerative change of the knee without   osseous injury evident. Knowledge of any trauma may be helpful. If   there is persistent concern for osseous injury, cross-sectional   imaging can be considered.   2. Reticular increased density in the subcutaneous tissues which may   represent  lymphedema and/or cellulitis.        MACRO:   None        Signed by: Adama Jonas 2/2/2024 4:24 PM   Dictation workstation:   [id number]      US renal complete   Final Result   No left hydronephrosis. Right total nephrectomy.        MACRO:   None        Signed by: Amando Starks 2/2/2024 2:36 PM   Dictation workstation:   [id number]      Cardiac catheterization - non-coronary    (Results Pending)       Transthoracic Echo (TTE) Complete    Result Date: 2/5/2024   [facility], [address] [place] [address]               Tel [phone] and Fax [phone] TRANSTHORACIC ECHOCARDIOGRAM REPORT  Patient Name:      MOE SHANNA MAYA      Reading Physician:    [id number] Nicholas Antonio MD Study Date:        2/5/2024             Ordering Provider:    [id number] DOMONIQUE PURCELL MRN/PID:           [id number]             Fellow: Accession#:        [id number]         Nurse: Date of Birth/Age: 1955 / 68 years Sonographer:          Nina Angel                                                               [facility] Gender:            F                    Additional Staff: Height:            167.64 cm            Admit Date:           2/2/2024 Weight:            102.51 kg            Admission Status:     Inpatient -                                                               Routine BSA:               2.11 m2              Encounter#:           [id number]                                         Department Location:  [facility] Non                                                               Invasive Blood Pressure: 147 /65 mmHg Study Type:    TRANSTHORACIC ECHO (TTE) COMPLETE Diagnosis/ICD: Acute combined systolic (congestive) and diastolic (congestive)                heart failure (CHF)-I50.41 Indication:    Congestive Heart Failure CPT Code:      Echo Complete w Full  [id number] Patient History: Diabetes:         Yes Pertinent         A-Fib, HTN, Dyspnea and LE Edema. Watchman device, elevated History:          BNP,. Study Detail: The following Echo studies were performed: 2D, M-Mode, Doppler and               color flow.  PHYSICIAN INTERPRETATION: Left Ventricle: The left ventricular systolic function is normal, with an estimated ejection fraction of 60-65%. There are no regional wall motion abnormalities. The left ventricular cavity size is normal. Spectral Doppler shows an impaired relaxation pattern of left ventricular diastolic filling. Left Atrium: The left atrium is moderately dilated. Right Ventricle: The right ventricle is mildly enlarged. There is mildly reduced right ventricular systolic function. Right Atrium: The right atrium is moderately dilated. Aortic Valve: The aortic valve is trileaflet. There is mild aortic valve cusp calcification. There is trivial aortic valve regurgitation. The peak instantaneous gradient of the aortic valve is 14.1 mmHg. The mean gradient of the aortic valve is 9.1 mmHg. Mitral Valve: The mitral valve is normal in structure. There is mild mitral annular calcification. There is mild to moderate mitral valve regurgitation. Tricuspid Valve: The tricuspid valve is structurally normal. There is moderate tricuspid regurgitation. Pulmonic Valve: The pulmonic valve is structurally normal. There is mild pulmonic valve regurgitation. Pericardium: There is a small to moderate pericardial effusion posterior to the left ventricle. There is no evidence of cardiac tamponade. Aorta: The aortic root is normal. Pulmonary Artery: The tricuspid regurgitant velocity is 3.04 m/s, and with an estimated right atrial pressure of 3 mmHg, the estimated pulmonary artery pressure is mildly elevated with the RVSP at 39.9 mmHg. Pulmonary Veins: The pulmonary veins appear normal and return normally to the left atrium. Systemic Veins: The inferior vena cava appears to  be of normal size. There is IVC inspiratory collapse greater than 50%.  CONCLUSIONS:  1. Left ventricular systolic function is normal with a 60-65% estimated ejection fraction.  2. Spectral Doppler shows an impaired relaxation pattern of left ventricular diastolic filling.  3. There is mildly reduced right ventricular systolic function.  4. The left atrium is moderately dilated.  5. The right atrium is moderately dilated.  6. There is a small to moderate pericardial effusion.  7. There is no evidence of cardiac tamponade.  8. Mild to moderate mitral valve regurgitation.  9. Moderate tricuspid regurgitation visualized. 10. Normal CVP. Estimated PASP around 45 mm Hg. QUANTITATIVE DATA SUMMARY: 2D MEASUREMENTS:                           Normal Ranges: IVSd:          1.52 cm    (0.6-1.1cm) LVPWd:         1.42 cm    (0.6-1.1cm) LVIDd:         4.35 cm    (3.9-5.9cm) LVIDs:         2.94 cm LV Mass Index: 120.7 g/m2 LV % FS        32.3 % LA VOLUME:                              Normal Ranges: LA Vol A4C:       79.5 ml    (22+/-6mL/m2) LA Vol A2C:       78.2 ml LA Vol BP:        79.7 ml LA Vol Index A4C: 37.7ml/m2 LA Vol Index A2C: 37.1 ml/m2 LA Vol Index BP:  37.9 ml/m2 LA Volume Index:  37.8 ml/m2 LA Vol A4C:       76.0 ml LA Vol A2C:       73.9 ml RA VOLUME BY A/L METHOD:                       Normal Ranges: RA Area A4C: 17.8 cm2 M-MODE MEASUREMENTS:                  Normal Ranges: Ao Root: 3.00 cm (2.0-3.7cm) LAs:     5.08 cm (2.7-4.0cm) LV SYSTOLIC FUNCTION BY 2D PLANIMETRY (MOD):                     Normal Ranges: EF-A4C View: 63.4 % (>=55%) EF-A2C View: 63.8 % EF-Biplane:  62.5 % LV DIASTOLIC FUNCTION:                             Normal Ranges: MV Peak E:      1.33 m/s    (0.7-1.2 m/s) MV Peak A:      0.53 m/s    (0.42-0.7 m/s) E/A Ratio:      2.52        (1.0-2.2) MV e'           0.06 m/s    (>8.0) MV lateral e'   0.06 m/s MV medial e'    0.06 m/s MV A Dur:       110.73 msec E/e' Ratio:     22.09       (<8.0) PulmV  Sys Mike:  53.97 cm/s PulmV Dugan Mike: 77.63 cm/s PulmV S/D Mike:  0.70 MITRAL VALVE:                 Normal Ranges: MV DT: 245 msec (150-240msec) MITRAL INSUFFICIENCY:                         Normal Ranges: MR VTI:     171.38 cm MR Vmax:    489.73 cm/s MR Volume:  20.07 ml MR Flow Rt: 57.36 ml/s MR EROA:    0.12 cm2 AORTIC VALVE:                                    Normal Ranges: AoV Vmax:                1.88 m/s  (<=1.7m/s) AoV Peak P.1 mmHg (<20mmHg) AoV Mean P.1 mmHg  (1.7-11.5mmHg) LVOT Max Mike:            0.97 m/s  (<=1.1m/s) AoV VTI:                 46.22 cm  (18-25cm) LVOT VTI:                24.89 cm LVOT Diameter:           1.96 cm   (1.8-2.4cm) AoV Area, VTI:           1.62 cm2  (2.5-5.5cm2) AoV Area,Vmax:           1.56 cm2  (2.5-4.5cm2) AoV Dimensionless Index: 0.54  RIGHT VENTRICLE: RV Basal 3.80 cm RV Mid   2.80 cm RV Major 8.0 cm TAPSE:   18.0 mm RV s'    0.13 m/s TRICUSPID VALVE/RVSP:                             Normal Ranges: Peak TR Velocity: 3.04 m/s RV Syst Pressure: 39.9 mmHg (< 30mmHg) PULMONIC VALVE:                      Normal Ranges: PV Max Mike: 1.0 m/s  (0.6-0.9m/s) PV Max PG:  3.9 mmHg Pulmonary Veins: PulmV Dugan Mike: 77.63 cm/s PulmV S/D Mike:  0.70 PulmV Sys Mike:  53.97 cm/s  [id number] Nicholas Antonio MD Electronically signed on 2024 at 5:06:05 PM  ** Final **           Assessment/Plan   Principal Problem:    CHF (congestive heart failure), NYHA class I, acute on chronic, combined (CMS/HCC)    Echocardiogram from 2023 (post watchman) reviewed: LVEF 55%, small to moderate pericardial effusions         1. Acute on chronic hypoxic/hypercapnic respiratory failure 2/2 acute on chronic diastolic CHF and pneumonia, COPD exac, Pleural effusion  -CXR showed large right pleural effusion  -CTA of the chest showed large right pleural effusion with complete collapse of the right lower lung, pneumonia, a small pericardial effusion, and soft tissue edema at the  chest and abd wall.   -antibiotics  -bronchodilators  -Steroids  -oxygen support/Bipap  -sputum culture  -blood cultures  -Still with ignificant leg to abd swelling and shortness of breath  -BNP 1786  -Strict I & Os  -Daily weights  -2gm na diet  -Repeat echo as above  -Unable to start SGLT2 inhibitors with current GFR  -Cont Lasix IV     --Discussed with Dr. Peterson, plan for RHC tomorrow  -Pulmonary following, s/p thoracentesis 2.3L  -Monitor on tele     2. Elevated troponins-non MI elevation  -Denies chest pain  -Does have shortness of breath-worsening for weeks  -Had a LHC planned 2 weeks ago->unable to lay flat  -Cont asa, plavix, statin, metoprolol  -Unable to do LHC due to Creatinine 3.6  -Monitor on tele     3. Paroxysmal atrial fibrillation with RVR  -s/p Watchman in Aug 2023  -Currently SB  -Cont amiodarone and metoprolol for rate control  -Monitor on tele     4. Hypertension  -stable  -2gm na diet  -cont meds  -monitor     5. Anemia  -Appear stable     6. Acute on Chronic kidney disease  -s/p nephrectomy  -Renal consulted, note reviewed, discussed with Dr. Gaspar  -Renal US negative  -Monitor closely on Lasix IV  -Discussed with Dr. Peterson, Plan for right heart cath tomorrow     7. Diabetes  -ISS  -Accuchecks     8. Hypothyroidism  -Cont synthroid     9. Pericardial effusion  -Mild to moderate on echo  -Cont diuresis      CARLENE Villegas-CNP

## 2024-02-06 NOTE — PROGRESS NOTES
Daphne Morris is a 68 y.o. female on day 4 of admission presenting with CHF (congestive heart failure), NYHA class I, acute on chronic, combined (CMS/HCC).      Subjective   States her skin on right arm is leaking, but otherwise ok.sleeping. breathing is comfortable       Objective     Last Recorded Vitals  /72 (BP Location: Right arm, Patient Position: Lying)   Pulse 62   Temp 36.5 °C (97.7 °F) (Temporal)   Resp 18   Wt 104 kg (228 lb 14.4 oz)   SpO2 94%   Intake/Output last 3 Shifts:    Intake/Output Summary (Last 24 hours) at 2/6/2024 0906  Last data filed at 2/6/2024 0526  Gross per 24 hour   Intake 300 ml   Output 825 ml   Net -525 ml       Admission Weight  Weight: 99.8 kg (220 lb 0.3 oz) (02/02/24 0404)    Daily Weight  02/06/24 : 104 kg (228 lb 14.4 oz)    Image Results  Transthoracic Echo (TTE) [facility], [address] [place] [address]                Tel [phone] and Fax [phone]    TRANSTHORACIC ECHOCARDIOGRAM REPORT       Patient Name:      DAPHNE MORRIS      Reading Physician:    [id number] Nicholas Antonio MD  Study Date:        2/5/2024             Ordering Provider:    [id number] DOMONIQUE PURCELL  MRN/PID:           [id number]             Fellow:  Accession#:        [id number]         Nurse:  Date of Birth/Age: 1955 / 68 years Sonographer:          Nina Angel                                                                [facility]  Gender:            F                    Additional Staff:  Height:            167.64 cm            Admit Date:           2/2/2024  Weight:            102.51 kg            Admission Status:     Inpatient -                                                                Routine  BSA:               2.11 m2              Encounter#:           [id number]                                           Department Location:  [facility] Non                                                                Invasive  Blood Pressure: 147 /65 mmHg    Study Type:    TRANSTHORACIC ECHO (TTE) COMPLETE  Diagnosis/ICD: Acute combined systolic (congestive) and diastolic (congestive)                 heart failure (CHF)-I50.41  Indication:    Congestive Heart Failure  CPT Code:      Echo Complete w Full Doppler-[id number]    Patient History:  Diabetes:         Yes  Pertinent         A-Fib, HTN, Dyspnea and LE Edema. Watchman device, elevated  History:          BNP,.    Study Detail: The following Echo studies were performed: 2D, M-Mode, Doppler and                color flow.       PHYSICIAN INTERPRETATION:  Left Ventricle: The left ventricular systolic function is normal, with an estimated ejection fraction of 60-65%. There are no regional wall motion abnormalities. The left ventricular cavity size is normal. Spectral Doppler shows an impaired relaxation pattern of left ventricular diastolic filling.  Left Atrium: The left atrium is moderately dilated.  Right Ventricle: The right ventricle is mildly enlarged. There is mildly reduced right ventricular systolic function.  Right Atrium: The right atrium is moderately dilated.  Aortic Valve: The aortic valve is trileaflet. There is mild aortic valve cusp calcification. There is trivial aortic valve regurgitation. The peak instantaneous gradient of the aortic valve is 14.1 mmHg. The mean gradient of the aortic valve is 9.1 mmHg.  Mitral Valve: The mitral valve is normal in structure. There is mild mitral annular calcification. There is mild to moderate mitral valve regurgitation.  Tricuspid Valve: The tricuspid valve is structurally normal. There is moderate tricuspid regurgitation.  Pulmonic Valve: The pulmonic valve is structurally normal. There is mild pulmonic valve regurgitation.  Pericardium: There is a small to moderate pericardial effusion posterior to the left ventricle. There is  no evidence of cardiac tamponade.  Aorta: The aortic root is normal.  Pulmonary Artery: The tricuspid regurgitant velocity is 3.04 m/s, and with an estimated right atrial pressure of 3 mmHg, the estimated pulmonary artery pressure is mildly elevated with the RVSP at 39.9 mmHg.  Pulmonary Veins: The pulmonary veins appear normal and return normally to the left atrium.  Systemic Veins: The inferior vena cava appears to be of normal size. There is IVC inspiratory collapse greater than 50%.       CONCLUSIONS:   1. Left ventricular systolic function is normal with a 60-65% estimated ejection fraction.   2. Spectral Doppler shows an impaired relaxation pattern of left ventricular diastolic filling.   3. There is mildly reduced right ventricular systolic function.   4. The left atrium is moderately dilated.   5. The right atrium is moderately dilated.   6. There is a small to moderate pericardial effusion.   7. There is no evidence of cardiac tamponade.   8. Mild to moderate mitral valve regurgitation.   9. Moderate tricuspid regurgitation visualized.  10. Normal CVP. Estimated PASP around 45 mm Hg.    QUANTITATIVE DATA SUMMARY:  2D MEASUREMENTS:                            Normal Ranges:  IVSd:          1.52 cm    (0.6-1.1cm)  LVPWd:         1.42 cm    (0.6-1.1cm)  LVIDd:         4.35 cm    (3.9-5.9cm)  LVIDs:         2.94 cm  LV Mass Index: 120.7 g/m2  LV % FS        32.3 %    LA VOLUME:                               Normal Ranges:  LA Vol A4C:       79.5 ml    (22+/-6mL/m2)  LA Vol A2C:       78.2 ml  LA Vol BP:        79.7 ml  LA Vol Index A4C: 37.7ml/m2  LA Vol Index A2C: 37.1 ml/m2  LA Vol Index BP:  37.9 ml/m2  LA Volume Index:  37.8 ml/m2  LA Vol A4C:       76.0 ml  LA Vol A2C:       73.9 ml    RA VOLUME BY A/L METHOD:                        Normal Ranges:  RA Area A4C: 17.8 cm2    M-MODE MEASUREMENTS:                   Normal Ranges:  Ao Root: 3.00 cm (2.0-3.7cm)  LAs:     5.08 cm (2.7-4.0cm)    LV SYSTOLIC  FUNCTION BY 2D PLANIMETRY (MOD):                      Normal Ranges:  EF-A4C View: 63.4 % (>=55%)  EF-A2C View: 63.8 %  EF-Biplane:  62.5 %    LV DIASTOLIC FUNCTION:                              Normal Ranges:  MV Peak E:      1.33 m/s    (0.7-1.2 m/s)  MV Peak A:      0.53 m/s    (0.42-0.7 m/s)  E/A Ratio:      2.52        (1.0-2.2)  MV e'           0.06 m/s    (>8.0)  MV lateral e'   0.06 m/s  MV medial e'    0.06 m/s  MV A Dur:       110.73 msec  E/e' Ratio:     22.09       (<8.0)  PulmV Sys Mike:  53.97 cm/s  PulmV Dugan Mike: 77.63 cm/s  PulmV S/D Mike:  0.70    MITRAL VALVE:                  Normal Ranges:  MV DT: 245 msec (150-240msec)    MITRAL INSUFFICIENCY:                          Normal Ranges:  MR VTI:     171.38 cm  MR Vmax:    489.73 cm/s  MR Volume:  20.07 ml  MR Flow Rt: 57.36 ml/s  MR EROA:    0.12 cm2    AORTIC VALVE:                                     Normal Ranges:  AoV Vmax:                1.88 m/s  (<=1.7m/s)  AoV Peak P.1 mmHg (<20mmHg)  AoV Mean P.1 mmHg  (1.7-11.5mmHg)  LVOT Max Mike:            0.97 m/s  (<=1.1m/s)  AoV VTI:                 46.22 cm  (18-25cm)  LVOT VTI:                24.89 cm  LVOT Diameter:           1.96 cm   (1.8-2.4cm)  AoV Area, VTI:           1.62 cm2  (2.5-5.5cm2)  AoV Area,Vmax:           1.56 cm2  (2.5-4.5cm2)  AoV Dimensionless Index: 0.54       RIGHT VENTRICLE:  RV Basal 3.80 cm  RV Mid   2.80 cm  RV Major 8.0 cm  TAPSE:   18.0 mm  RV s'    0.13 m/s    TRICUSPID VALVE/RVSP:                              Normal Ranges:  Peak TR Velocity: 3.04 m/s  RV Syst Pressure: 39.9 mmHg (< 30mmHg)    PULMONIC VALVE:                       Normal Ranges:  PV Max Mike: 1.0 m/s  (0.6-0.9m/s)  PV Max PG:  3.9 mmHg    Pulmonary Veins:  PulmV Dugan Mike: 77.63 cm/s  PulmV S/D Mike:  0.70  PulmV Sys Mike:  53.97 cm/s       [id number] Nicholas Antonio MD  Electronically signed on 2024 at 5:06:05 PM       ** Final **    No current facility-administered  "medications on file prior to encounter.     Current Outpatient Medications on File Prior to Encounter   Medication Sig Dispense Refill    albuterol 90 mcg/actuation inhaler Inhale 2 puffs every 4 hours if needed.      amiodarone (Pacerone) 200 mg tablet Take 1 tablet (200 mg) by mouth once daily with a meal.      amLODIPine (Norvasc) 5 mg tablet Take 1 tablet (5 mg) by mouth once daily.      aspirin 81 mg EC tablet Take 1 tablet (81 mg) by mouth once daily.      atorvastatin (Lipitor) 40 mg tablet Take 1 tablet (40 mg) by mouth once daily.      BD Calista 2nd Gen Pen Needle 32 gauge x 5/32\" needle USE SUBCUTANEOUSLY AS DIRECTED TWICE DAILY      clopidogrel (Plavix) 75 mg tablet Take 1 tablet (75 mg) by mouth once daily.      ferrous gluconate 324 (38 Fe) MG tablet Take 1 tablet (324 mg) by mouth once daily with breakfast. 30 tablet 3    FreeStyle Shakir 14 Day Sensor kit USE AS DIRECTED TO CHECK SUGARS 3 TO 4 TIMES DAILY      FreeStyle Lite Strips strip twice a day.      HumaLOG KwikPen Insulin 100 unit/mL injection up to 15 units Subcutaneous three times a day per sliding scale      ipratropium-albuteroL (Duo-Neb) 0.5-2.5 mg/3 mL nebulizer solution Take 3 mL by nebulization every 6 hours if needed.      Lantus Solostar U-100 Insulin 100 unit/mL (3 mL) pen Inject 14 Units under the skin once daily at bedtime.      levothyroxine (Synthroid, Levoxyl) 200 mcg tablet Take 1 tablet (200 mcg) by mouth once daily in the morning. Take before meals. 90 tablet 3    levothyroxine (Synthroid, Levoxyl) 50 mcg tablet Take 1 tablet (50 mcg) by mouth once daily in the morning. Take before meals.      metoprolol succinate XL (Toprol-XL) 50 mg 24 hr tablet TAKE ONE TABLET BY MOUTH TWO TIMES A  tablet 1    oxygen (O2) gas therapy 2 l/min nasal cannula      pantoprazole (ProtoNix) 40 mg EC tablet Take 1 tablet (40 mg) by mouth 2 times a day. 60 tablet 6    potassium chloride CR 20 mEq ER tablet TAKE ONE TABLET BY MOUTH EVERY DAY " WITH FOOD 90 tablet 1    SITagliptin phosphate (Januvia) 100 mg tablet Take 1 tablet (100 mg) by mouth once daily.      Soaanz 60 mg tablet Take 1 tablet by mouth once daily. 30 tablet 3    venlafaxine XR (Effexor-XR) 75 mg 24 hr capsule Take 1 capsule (75 mg) by mouth once daily.        Results for orders placed or performed during the hospital encounter of 02/02/24 (from the past 24 hour(s))   POCT GLUCOSE   Result Value Ref Range    POCT Glucose 142 (H) 74 - 99 mg/dL   Transthoracic Echo (TTE) Complete   Result Value Ref Range    AV pk isai 1.88 m/s    AV mn grad 9.1 mmHg    LVOT diam 1.96 cm    LV biplane EF 63 %    MV avg E/e' ratio 22.09     MV E/A ratio 2.52     LA vol index A/L 37.9 ml/m2    Tricuspid annular plane systolic excursion 1.8 cm    RV free wall pk S' 13.00 cm/s    LVIDd 4.35 cm    RVSP 39.9 mmHg    Aortic Valve Area by Continuity of VTI 1.62 cm2    Aortic Valve Area by Continuity of Peak Velocity 1.56 cm2    AV pk grad 14.1 mmHg    LV A4C EF 63.4    POCT GLUCOSE   Result Value Ref Range    POCT Glucose 295 (H) 74 - 99 mg/dL   POCT GLUCOSE   Result Value Ref Range    POCT Glucose 261 (H) 74 - 99 mg/dL   Renal Function Panel   Result Value Ref Range    Glucose 217 (H) 74 - 99 mg/dL    Sodium 139 136 - 145 mmol/L    Potassium 4.8 3.5 - 5.3 mmol/L    Chloride 101 98 - 107 mmol/L    Bicarbonate 26 21 - 32 mmol/L    Anion Gap 17 10 - 20 mmol/L    Urea Nitrogen 68 (H) 6 - 23 mg/dL    Creatinine 3.67 (H) 0.50 - 1.05 mg/dL    eGFR 13 (L) >60 mL/min/1.73m*2    Calcium 7.5 (L) 8.6 - 10.3 mg/dL    Phosphorus 6.3 (H) 2.5 - 4.9 mg/dL    Albumin 2.5 (L) 3.4 - 5.0 g/dL   CBC   Result Value Ref Range    WBC 8.5 4.4 - 11.3 x10*3/uL    nRBC 0.0 0.0 - 0.0 /100 WBCs    RBC 2.85 (L) 4.00 - 5.20 x10*6/uL    Hemoglobin 8.6 (L) 12.0 - 16.0 g/dL    Hematocrit 29.5 (L) 36.0 - 46.0 %     (H) 80 - 100 fL    MCH 30.2 26.0 - 34.0 pg    MCHC 29.2 (L) 32.0 - 36.0 g/dL    RDW 14.3 11.5 - 14.5 %    Platelets 236 150 - 450  x10*3/uL   POCT GLUCOSE   Result Value Ref Range    POCT Glucose 186 (H) 74 - 99 mg/dL        Physical Exam  HENT:      Head: Normocephalic.      Nose: Nose normal.      Mouth/Throat:      Mouth: Mucous membranes are dry.   Eyes:      Extraocular Movements: Extraocular movements intact.      Pupils: Pupils are equal, round, and reactive to light.   Neck:      Comments: No JVD  Cardiovascular:      Pulses: Normal pulses.      Comments: Regular, 2/6 systolic murmur  Pulmonary:      Effort: Pulmonary effort is normal.      Breath sounds: Normal breath sounds.   Abdominal:      General: Bowel sounds are normal.      Palpations: Abdomen is soft.   Genitourinary:     Comments: Catheter in place  Musculoskeletal:      Comments: Both legs are wrapped, waffle boots in place  Trace-1+ pitting edema above dressings, tender. BLE   Skin:     Comments: Skin of SHARON is damp, but no open areas etc   Neurological:      Mental Status: She is alert.      Comments: Alert, able to answer questions, but seems to have delay in understanding questions when asked. Able to follow commands.   Cranial nerves intact  Strength of lower extremities, 4/5 bilaterally   Psychiatric:         Mood and Affect: Mood normal.         Behavior: Behavior normal.         Relevant Results     Assessment/Plan          This patient has a urinary catheter   Reason for the urinary catheter remaining today? critically ill patient who need accurate urinary output measurements          Principal Problem:    CHF (congestive heart failure), NYHA class I, acute on chronic, combined (CMS/[facility])    Decompensated diastolic heart , only about 900 c negative per charting. She also has MR/TR, moderate. Scheduled for RHC tomorrow per cards note. Maintain vega to accurately monitor output.   Anemia, status post transfusion of 2 units of blood. Monitor  Right pleural effusion, status post thora, 2.3 liters.   Chronic respiratory failure, has o2 at home. Has been on 1.5 liters  recently.   CKD, continue to monitor  DM. Sugars have been on high side, will adjust med doses and continue to monitor closely  Deconditioning. Therapy is recommending SNF, she is refusing. Concerned about her managing at home, even with help.               Alejandra Marshall MD

## 2024-02-06 NOTE — CARE PLAN
The patient's goals for the shift include      The clinical goals for the shift include Pt will not have pain this shift    Over the shift, the patient did not make progress toward the following goals. Barriers to progression include the patient. Recommendations to address these barriers include educate patient on pain regime.

## 2024-02-06 NOTE — PROGRESS NOTES
Daphne Morris is a 68 y.o. female on day 4 of admission presenting with CHF (congestive heart failure), NYHA class I, acute on chronic, combined (CMS/HCC).      Subjective   Denies any chest pain.  No trouble breathing.  Negative fluid balance of only 285 cc yesterday.  Legs are still swollen.       Objective          Vitals 24HR  Heart Rate:  [40-84]   Temp:  [36.5 °C (97.7 °F)-36.7 °C (98.1 °F)]   Resp:  [17-21]   BP: (122-153)/(57-72)   Weight:  [104 kg (228 lb 14.4 oz)]   SpO2:  [92 %-99 %]       Intake/Output last 3 Shifts:    Intake/Output Summary (Last 24 hours) at 2/6/2024 1049  Last data filed at 2/6/2024 1037  Gross per 24 hour   Intake 540 ml   Output 825 ml   Net -285 ml       Physical Exam    68-year-old female currently in no distress.  No Jugular vein distention noted.  Systolic murmur.  Lungs with decreased breath sounds in the bases.  1-2+ lower extremity edema.    Relevant Results  Scheduled medications  amiodarone, 200 mg, oral, Daily with breakfast  amLODIPine, 5 mg, oral, Daily  aspirin, 81 mg, oral, Daily  atorvastatin, 40 mg, oral, Daily  clopidogrel, 75 mg, oral, Daily  ferrous gluconate, 324 mg, oral, Daily with breakfast  furosemide, 40 mg, intravenous, q12h  heparin (porcine), 5,000 Units, subcutaneous, q8h  insulin glargine, 16 Units, subcutaneous, Nightly  insulin lispro, 0-15 Units, subcutaneous, TID with meals  ipratropium-albuteroL, 3 mL, nebulization, TID  levothyroxine, 200 mcg, oral, Daily before breakfast  levothyroxine, 50 mcg, oral, Daily before breakfast  metoprolol succinate XL, 50 mg, oral, BID  pantoprazole, 40 mg, oral, BID  [Held by provider] potassium chloride CR, 20 mEq, oral, Daily  predniSONE, 20 mg, oral, Daily  [Held by provider] SITagliptin phosphate, 25 mg, oral, Daily  venlafaxine XR, 75 mg, oral, Daily      Results for orders placed or performed during the hospital encounter of 02/02/24 (from the past 24 hour(s))   Transthoracic Echo (TTE) Complete   Result  Value Ref Range    AV pk isai 1.88 m/s    AV mn grad 9.1 mmHg    LVOT diam 1.96 cm    LV biplane EF 63 %    MV avg E/e' ratio 22.09     MV E/A ratio 2.52     LA vol index A/L 37.9 ml/m2    Tricuspid annular plane systolic excursion 1.8 cm    RV free wall pk S' 13.00 cm/s    LVIDd 4.35 cm    RVSP 39.9 mmHg    Aortic Valve Area by Continuity of VTI 1.62 cm2    Aortic Valve Area by Continuity of Peak Velocity 1.56 cm2    AV pk grad 14.1 mmHg    LV A4C EF 63.4    POCT GLUCOSE   Result Value Ref Range    POCT Glucose 295 (H) 74 - 99 mg/dL   POCT GLUCOSE   Result Value Ref Range    POCT Glucose 261 (H) 74 - 99 mg/dL   Renal Function Panel   Result Value Ref Range    Glucose 217 (H) 74 - 99 mg/dL    Sodium 139 136 - 145 mmol/L    Potassium 4.8 3.5 - 5.3 mmol/L    Chloride 101 98 - 107 mmol/L    Bicarbonate 26 21 - 32 mmol/L    Anion Gap 17 10 - 20 mmol/L    Urea Nitrogen 68 (H) 6 - 23 mg/dL    Creatinine 3.67 (H) 0.50 - 1.05 mg/dL    eGFR 13 (L) >60 mL/min/1.73m*2    Calcium 7.5 (L) 8.6 - 10.3 mg/dL    Phosphorus 6.3 (H) 2.5 - 4.9 mg/dL    Albumin 2.5 (L) 3.4 - 5.0 g/dL   CBC   Result Value Ref Range    WBC 8.5 4.4 - 11.3 x10*3/uL    nRBC 0.0 0.0 - 0.0 /100 WBCs    RBC 2.85 (L) 4.00 - 5.20 x10*6/uL    Hemoglobin 8.6 (L) 12.0 - 16.0 g/dL    Hematocrit 29.5 (L) 36.0 - 46.0 %     (H) 80 - 100 fL    MCH 30.2 26.0 - 34.0 pg    MCHC 29.2 (L) 32.0 - 36.0 g/dL    RDW 14.3 11.5 - 14.5 %    Platelets 236 150 - 450 x10*3/uL   POCT GLUCOSE   Result Value Ref Range    POCT Glucose 186 (H) 74 - 99 mg/dL               Assessment/Plan      1.acute kidney injury/CKD 4.  Chronic kidney disease secondary to hypertension and diabetic nephropathy.  Type 2 diabetes.  Acute kidney injury in the setting of cardiorenal syndrome with volume overload.  Creatinine relatively stable since yesterday.  Still quite volume overloaded.  Discussed with cardiology.  Plan to continue IV diuretics for now.  She may end up requiring renal replacement  therapy in the near future.    2.acute on chronic diastolic heart failure with cardiorenal syndrome.  Still volume overloaded.  Echocardiogram reviewed.  Plan for right heart cath.  Will follow-up on the results.  Continue with IV Lasix for now.    3.anemia of chronic kidney disease.  Check iron studies to see if she would benefit from IV iron.    Overall long-term renal prognosis extremely poor.  May need renal replacement therapy in the near future.  Will continue to follow closely.            García Gaspar MD

## 2024-02-06 NOTE — PROGRESS NOTES
Daphne Morris is a 68 y.o. female on day 3 of admission presenting with CHF (congestive heart failure), NYHA class I, acute on chronic, combined (CMS/HCC).      Subjective   She is sitting up in the bed, states she is feeling much better. She is currently on 1.5 L nc and reports an improvement in her swelling. She states that her shortness of breath has improved.     Review of systems:  Constitutional: negative for fever, chills, or malaise  Neuro: negative for dizziness, headache, numbness, tingling  ENT: Negative for nasal congestion or sore throat  CV: negative for chest pain, palpitations  GI: negative for abd pain, nausea, vomiting or diarrhea  : negative for dysuria, frequency, or urgency  Musculoskeletal: Negative for weakness, myalgia, or arthralgia  Endocrine: Negative for polyuria or polydipsia         Objective   Constitutional: Well developed, awake/alert/oriented x3, no distress, alert and cooperative  Eyes: PERRL, EOMI, clear sclera  ENMT: mucous membranes moist, no apparent injury, no lesions seen  Head/Neck: Neck supple, no apparent injury, thyroid without mass or tenderness, No JVD, trachea midline, no bruits  Respiratory/Thorax: Patent airways, CTAB, diminished and crackles in bases, breath sounds with good chest expansion, thorax symmetric  Cardiovascular: Regular, rate and rhythm, no murmurs, 2+ equal pulses of the extremities, normal S 1and S 2  Gastrointestinal: Nondistended, soft, non-tender, no rebound tenderness or guarding, no masses palpable, no organomegaly, +BS, no bruits  Musculoskeletal: ROM intact, no joint swelling, normal strength  Extremities: normal extremities, no cyanosis, 2+ pitting edema BUE and lower extremities, contusions or wounds, no clubbing  Neurological: alert and oriented x3, intact senses, motor, response and reflexes, normal strength  Lymphatic: No significant lymphadenopathy  Psychological: Appropriate mood and behavior  Skin: Warm and dry, no lesions, no  "padmaja      Last Recorded Vitals  /61 (BP Location: Right arm, Patient Position: Lying)   Pulse 65   Temp 36.5 °C (97.7 °F) (Temporal)   Resp 17   Ht 1.676 m (5' 6\")   Wt 103 kg (226 lb 8 oz)   SpO2 92%   BMI 36.56 kg/m²     Intake/Output last 3 Shifts:  I/O last 3 completed shifts:  In: 1050 (10.2 mL/kg) [P.O.:1050]  Out: 1130 (11 mL/kg) [Urine:1130 (0.3 mL/kg/hr)]  Weight: 102.7 kg   No intake/output data recorded.    Relevant Results  Scheduled medications  amiodarone, 200 mg, oral, Daily with breakfast  amLODIPine, 5 mg, oral, Daily  aspirin, 81 mg, oral, Daily  atorvastatin, 40 mg, oral, Daily  clopidogrel, 75 mg, oral, Daily  ferrous gluconate, 324 mg, oral, Daily with breakfast  furosemide, 40 mg, intravenous, q12h  heparin (porcine), 5,000 Units, subcutaneous, q8h  insulin glargine, 14 Units, subcutaneous, Nightly  insulin lispro, 0-15 Units, subcutaneous, TID with meals  ipratropium-albuteroL, 3 mL, nebulization, TID  levothyroxine, 200 mcg, oral, Daily before breakfast  levothyroxine, 50 mcg, oral, Daily before breakfast  metoprolol succinate XL, 50 mg, oral, BID  pantoprazole, 40 mg, oral, BID  [Held by provider] potassium chloride CR, 20 mEq, oral, Daily  [START ON 2/6/2024] predniSONE, 20 mg, oral, Daily  [Held by provider] SITagliptin phosphate, 25 mg, oral, Daily  venlafaxine XR, 75 mg, oral, Daily      Continuous medications     PRN medications  PRN medications: acetaminophen, dextrose 10 % in water (D10W), dextrose, glucagon, HYDROmorphone, ipratropium-albuteroL, oxygen, oxygen, oxygen    Results for orders placed or performed during the hospital encounter of 02/02/24 (from the past 24 hour(s))   POCT GLUCOSE   Result Value Ref Range    POCT Glucose 274 (H) 74 - 99 mg/dL   Renal Function Panel   Result Value Ref Range    Glucose 181 (H) 74 - 99 mg/dL    Sodium 139 136 - 145 mmol/L    Potassium 4.5 3.5 - 5.3 mmol/L    Chloride 103 98 - 107 mmol/L    Bicarbonate 28 21 - 32 mmol/L    Anion " Gap 13 10 - 20 mmol/L    Urea Nitrogen 61 (H) 6 - 23 mg/dL    Creatinine 3.67 (H) 0.50 - 1.05 mg/dL    eGFR 13 (L) >60 mL/min/1.73m*2    Calcium 7.6 (L) 8.6 - 10.3 mg/dL    Phosphorus 6.1 (H) 2.5 - 4.9 mg/dL    Albumin 2.4 (L) 3.4 - 5.0 g/dL   CBC   Result Value Ref Range    WBC 7.9 4.4 - 11.3 x10*3/uL    nRBC 0.0 0.0 - 0.0 /100 WBCs    RBC 2.72 (L) 4.00 - 5.20 x10*6/uL    Hemoglobin 8.2 (L) 12.0 - 16.0 g/dL    Hematocrit 27.8 (L) 36.0 - 46.0 %     (H) 80 - 100 fL    MCH 30.1 26.0 - 34.0 pg    MCHC 29.5 (L) 32.0 - 36.0 g/dL    RDW 14.5 11.5 - 14.5 %    Platelets 248 150 - 450 x10*3/uL   Blood Gas Venous   Result Value Ref Range    POCT pH, Venous 7.34 7.33 - 7.43 pH    POCT pCO2, Venous 53 (H) 41 - 51 mm Hg    POCT pO2, Venous 69 (H) 35 - 45 mm Hg    POCT SO2, Venous 95 (H) 45 - 75 %    POCT Oxy Hemoglobin, Venous 94.0 (H) 45.0 - 75.0 %    POCT Base Excess, Venous 1.8 -2.0 - 3.0 mmol/L    POCT HCO3 Calculated, Venous 28.6 (H) 22.0 - 26.0 mmol/L    Patient Temperature      FiO2 30 %   POCT GLUCOSE   Result Value Ref Range    POCT Glucose 151 (H) 74 - 99 mg/dL   POCT GLUCOSE   Result Value Ref Range    POCT Glucose 142 (H) 74 - 99 mg/dL   Transthoracic Echo (TTE) Complete   Result Value Ref Range    AV pk isai 1.88 m/s    AV mn grad 9.1 mmHg    LVOT diam 1.96 cm    LV biplane EF 63 %    MV avg E/e' ratio 22.09     MV E/A ratio 2.52     LA vol index A/L 37.9 ml/m2    Tricuspid annular plane systolic excursion 1.8 cm    RV free wall pk S' 13.00 cm/s    LVIDd 4.35 cm    RVSP 39.9 mmHg    Aortic Valve Area by Continuity of VTI 1.62 cm2    Aortic Valve Area by Continuity of Peak Velocity 1.56 cm2    AV pk grad 14.1 mmHg    LV A4C EF 63.4    POCT GLUCOSE   Result Value Ref Range    POCT Glucose 295 (H) 74 - 99 mg/dL       Transthoracic Echo (TTE) Complete   Final Result      XR chest 1 view   Final Result   Marked interval improvement in the right pleural effusion with   interval thoracentesis. No pneumothorax.         Pulmonary vascular congestion        Minimal atelectasis right lung base        MACRO:   None.        Signed by: Rayna Carson 2/2/2024 3:23 PM   Dictation workstation:   [id number]      XR knee left 4+ views   Final Result   1. Small joint effusion and degenerative change of the knee without   osseous injury evident. Knowledge of any trauma may be helpful. If   there is persistent concern for osseous injury, cross-sectional   imaging can be considered.   2. Reticular increased density in the subcutaneous tissues which may   represent lymphedema and/or cellulitis.        MACRO:   None        Signed by: Adama Jonas 2/2/2024 4:24 PM   Dictation workstation:   [id number]      US renal complete   Final Result   No left hydronephrosis. Right total nephrectomy.        MACRO:   None        Signed by: Amando Starks 2/2/2024 2:36 PM   Dictation workstation:   [id number]          Transthoracic Echo (TTE) Complete    Result Date: 2/5/2024   [facility], [address] [place] [address]               Tel [phone] and Fax [phone] TRANSTHORACIC ECHOCARDIOGRAM REPORT  Patient Name:      MOE BABINGLENDEMETRIA      Reading Physician:    [id number] Nicholas Antonio MD Study Date:        2/5/2024             Ordering Provider:    [id number] DOMONIQUE PURCELL MRN/PID:           [id number]             Fellow: Accession#:        [id number]         Nurse: Date of Birth/Age: 1955 / 68 years Sonographer:          Nina Angel RDCS Gender:            F                    Additional Staff: Height:            167.64 cm            Admit Date:           2/2/2024 Weight:            102.51 kg            Admission Status:     Inpatient -                                                               Routine BSA:                2.11 m2              Encounter#:           [id number]                                         Department Location:  [facility]                                                               Invasive Blood Pressure: 147 /65 mmHg Study Type:    TRANSTHORACIC ECHO (TTE) COMPLETE Diagnosis/ICD: Acute combined systolic (congestive) and diastolic (congestive)                heart failure (CHF)-I50.41 Indication:    Congestive Heart Failure CPT Code:      Echo Complete w Full [id number] Patient History: Diabetes:         Yes Pertinent         A-Fib, HTN, Dyspnea and LE Edema. Watchman device, elevated History:          BNP,. Study Detail: The following Echo studies were performed: 2D, M-Mode, Doppler and               color flow.  PHYSICIAN INTERPRETATION: Left Ventricle: The left ventricular systolic function is normal, with an estimated ejection fraction of 60-65%. There are no regional wall motion abnormalities. The left ventricular cavity size is normal. Spectral Doppler shows an impaired relaxation pattern of left ventricular diastolic filling. Left Atrium: The left atrium is moderately dilated. Right Ventricle: The right ventricle is mildly enlarged. There is mildly reduced right ventricular systolic function. Right Atrium: The right atrium is moderately dilated. Aortic Valve: The aortic valve is trileaflet. There is mild aortic valve cusp calcification. There is trivial aortic valve regurgitation. The peak instantaneous gradient of the aortic valve is 14.1 mmHg. The mean gradient of the aortic valve is 9.1 mmHg. Mitral Valve: The mitral valve is normal in structure. There is mild mitral annular calcification. There is mild to moderate mitral valve regurgitation. Tricuspid Valve: The tricuspid valve is structurally normal. There is moderate tricuspid regurgitation. Pulmonic Valve: The pulmonic valve is structurally normal. There is mild pulmonic valve regurgitation. Pericardium: There is a small to moderate  pericardial effusion posterior to the left ventricle. There is no evidence of cardiac tamponade. Aorta: The aortic root is normal. Pulmonary Artery: The tricuspid regurgitant velocity is 3.04 m/s, and with an estimated right atrial pressure of 3 mmHg, the estimated pulmonary artery pressure is mildly elevated with the RVSP at 39.9 mmHg. Pulmonary Veins: The pulmonary veins appear normal and return normally to the left atrium. Systemic Veins: The inferior vena cava appears to be of normal size. There is IVC inspiratory collapse greater than 50%.  CONCLUSIONS:  1. Left ventricular systolic function is normal with a 60-65% estimated ejection fraction.  2. Spectral Doppler shows an impaired relaxation pattern of left ventricular diastolic filling.  3. There is mildly reduced right ventricular systolic function.  4. The left atrium is moderately dilated.  5. The right atrium is moderately dilated.  6. There is a small to moderate pericardial effusion.  7. There is no evidence of cardiac tamponade.  8. Mild to moderate mitral valve regurgitation.  9. Moderate tricuspid regurgitation visualized. 10. Normal CVP. Estimated PASP around 45 mm Hg. QUANTITATIVE DATA SUMMARY: 2D MEASUREMENTS:                           Normal Ranges: IVSd:          1.52 cm    (0.6-1.1cm) LVPWd:         1.42 cm    (0.6-1.1cm) LVIDd:         4.35 cm    (3.9-5.9cm) LVIDs:         2.94 cm LV Mass Index: 120.7 g/m2 LV % FS        32.3 % LA VOLUME:                              Normal Ranges: LA Vol A4C:       79.5 ml    (22+/-6mL/m2) LA Vol A2C:       78.2 ml LA Vol BP:        79.7 ml LA Vol Index A4C: 37.7ml/m2 LA Vol Index A2C: 37.1 ml/m2 LA Vol Index BP:  37.9 ml/m2 LA Volume Index:  37.8 ml/m2 LA Vol A4C:       76.0 ml LA Vol A2C:       73.9 ml RA VOLUME BY A/L METHOD:                       Normal Ranges: RA Area A4C: 17.8 cm2 M-MODE MEASUREMENTS:                  Normal Ranges: Ao Root: 3.00 cm (2.0-3.7cm) LAs:     5.08 cm (2.7-4.0cm) LV SYSTOLIC  FUNCTION BY 2D PLANIMETRY (MOD):                     Normal Ranges: EF-A4C View: 63.4 % (>=55%) EF-A2C View: 63.8 % EF-Biplane:  62.5 % LV DIASTOLIC FUNCTION:                             Normal Ranges: MV Peak E:      1.33 m/s    (0.7-1.2 m/s) MV Peak A:      0.53 m/s    (0.42-0.7 m/s) E/A Ratio:      2.52        (1.0-2.2) MV e'           0.06 m/s    (>8.0) MV lateral e'   0.06 m/s MV medial e'    0.06 m/s MV A Dur:       110.73 msec E/e' Ratio:     22.09       (<8.0) PulmV Sys Mike:  53.97 cm/s PulmV Dugan Mike: 77.63 cm/s PulmV S/D Mike:  0.70 MITRAL VALVE:                 Normal Ranges: MV DT: 245 msec (150-240msec) MITRAL INSUFFICIENCY:                         Normal Ranges: MR VTI:     171.38 cm MR Vmax:    489.73 cm/s MR Volume:  20.07 ml MR Flow Rt: 57.36 ml/s MR EROA:    0.12 cm2 AORTIC VALVE:                                    Normal Ranges: AoV Vmax:                1.88 m/s  (<=1.7m/s) AoV Peak P.1 mmHg (<20mmHg) AoV Mean P.1 mmHg  (1.7-11.5mmHg) LVOT Max Mike:            0.97 m/s  (<=1.1m/s) AoV VTI:                 46.22 cm  (18-25cm) LVOT VTI:                24.89 cm LVOT Diameter:           1.96 cm   (1.8-2.4cm) AoV Area, VTI:           1.62 cm2  (2.5-5.5cm2) AoV Area,Vmax:           1.56 cm2  (2.5-4.5cm2) AoV Dimensionless Index: 0.54  RIGHT VENTRICLE: RV Basal 3.80 cm RV Mid   2.80 cm RV Major 8.0 cm TAPSE:   18.0 mm RV s'    0.13 m/s TRICUSPID VALVE/RVSP:                             Normal Ranges: Peak TR Velocity: 3.04 m/s RV Syst Pressure: 39.9 mmHg (< 30mmHg) PULMONIC VALVE:                      Normal Ranges: PV Max Mike: 1.0 m/s  (0.6-0.9m/s) PV Max PG:  3.9 mmHg Pulmonary Veins: PulmV Dugan Mike: 77.63 cm/s PulmV S/D Mike:  0.70 PulmV Sys Mike:  53.97 cm/s  [id number] Nicholas Antonio MD Electronically signed on 2024 at 5:06:05 PM  ** Final **           Assessment/Plan   Principal Problem:    CHF (congestive heart failure), NYHA class I, acute on chronic, combined  (CMS/[facility])    Echocardiogram from August 2023 (post watchman) reviewed: LVEF 55%, small to moderate pericardial effusions         1. Acute on chronic hypoxic/hypercapnic respiratory failure 2/2 acute on chronic diastolic CHF and pneumonia, COPD exac, Pleural effusion  -CXR showed large right pleural effusion  -CTA of the chest showed large right pleural effusion with complete collapse of the right lower lung, pneumonia, a small pericardial effusion, and soft tissue edema at the chest and abd wall.   -antibiotics  -bronchodilators  -Steroids  -oxygen support/Bipap  -sputum culture  -blood cultures  -Still with ignificant leg to abd swelling and shortness of breath  -BNP 1786  -Strict I & Os  -Daily weights  -2gm na diet  -Repeat echo as above  -Unable to start SGLT2 inhibitors with current GFR  -Cont Lasix IV     --Discussed with Dr. Peterson, plan for RHC on Wednesday  -Pulmonary following, s/p thoracentesis 2.3L  -Monitor on tele     2. Elevated troponins-non MI elevation  -Denies chest pain  -Does have shortness of breath-worsening for weeks  -Had a LHC planned 2 weeks ago->unable to lay flat  -Cont asa, plavix, statin, metoprolol  -Unable to do LHC due to Creatinine 3.6  -Monitor on tele     3. Paroxysmal atrial fibrillation with RVR  -s/p Watchman in Aug 2023  -Currently SB  -Cont amiodarone and metoprolol for rate control  -Monitor on tele     4. Hypertension  -stable  -2gm na diet  -cont meds  -monitor     5. Anemia  -Appear stable     6. Acute on Chronic kidney disease  -s/p nephrectomy  -Renal consulted, note reviewed  -Renal US negative  -Monitor closely on Lasix IV  -Discussed with Dr. Peterson, Plan for right heart cath on Wednesday     7. Diabetes  -ISS  -Accuchecks     8. Hypothyroidism  -Cont synthroid     9. Pericardial effusion  -Mild to moderate on echo  -Cont diuresis      Kacey Monique, CARLENE-CNP

## 2024-02-06 NOTE — PROGRESS NOTES
Physical Therapy    Physical Therapy Treatment    Patient Name: Daphne Morris  MRN: [id number]  Today's Date: 2/6/2024  Time Calculation  Start Time: 1126  Stop Time: 1150  Time Calculation (min): 24 min       Assessment/Plan   PT Assessment  PT Assessment Results: Decreased strength, Decreased endurance, Decreased mobility, Obesity (deconditioning)  Rehab Prognosis: Good  Evaluation/Treatment Tolerance: Patient limited by fatigue  Medical Staff Made Aware: Yes  Strengths: Ability to acquire knowledge  End of Session Communication: Bedside nurse  End of Session Patient Position: Bed, 3 rail up, Alarm on (Pt's  is present with her)  PT Plan  Inpatient/Swing Bed or Outpatient: Inpatient  PT Plan  Treatment/Interventions: Bed mobility, Transfer training, Gait training, Strengthening, Therapeutic exercise  PT Plan: Skilled PT  PT Frequency: 3 times per week  PT Discharge Recommendations: Moderate intensity level of continued care  Equipment Recommended upon Discharge: Wheeled walker  PT - OK to Discharge: Yes (per PT POC)      General Visit Information:   PT  Visit  PT Received On: 02/06/24  Response to Previous Treatment: Patient with no complaints from previous session.  General  Reason for Referral:  (67 yo femake admitted 2' to CHF, general weakness, impaired mobility)  Referred By:  (Dr. AUGUSTIN/ [facility])  Past Medical History Relevant to Rehab:  (heart failure, A Fib, HTN, T2 DM)  Prior to Session Communication: Bedside nurse  Patient Position Received: Bed, 3 rail up, Alarm off, not on at start of session  General Comment:  (Pt cleared for PT. Pt agreeable to work on ex's with PT. Pt states that she feels a little better today. Py performed her ex's well but, required rest periods between sets)    Subjective   Precautions:  Precautions  Medical Precautions: Fall precautions  Vital Signs:       Objective   Pain:  Pain Assessment  Pain Assessment: 0-10  Pain Score: 0 - No pain  Cognition:     Postural Control:      Extremity/Trunk Assessments:  RLE   RLE : Within Functional Limits  LLE   LLE : Within Functional Limits  Activity Tolerance:  Activity Tolerance  Endurance: Tolerates 10 - 20 min exercise with multiple rests  Treatments:  Therapeutic Exercise  Therapeutic Exercise Performed: Yes  Therapeutic Exercise Activity 1:  (Pt performed the following supine ex's; B AP, B resisted hip flex/extn, B resisted hip abd/add and B SAQ each x 20 reps with rest periods between sets)    Outcome Measures:  [facility] Basic Mobility  Turning from your back to your side while in a flat bed without using bedrails: A lot  Moving from lying on your back to sitting on the side of a flat bed without using bedrails: A lot  Moving to and from bed to chair (including a wheelchair): A lot  Standing up from a chair using your arms (e.g. wheelchair or bedside chair): A lot  To walk in hospital room: A lot  Climbing 3-5 steps with railing: Total  Basic Mobility - Total Score: 11    Education Documentation  No documentation found.  Education Comments  No comments found.        OP EDUCATION:       Encounter Problems       Encounter Problems (Active)       Mobility       STG - Patient will ambulate 80 feet MOD I with wheeled walker (Progressing)       Start:  02/04/24    Expected End:  02/18/24            STG - Patient will ascend and descend four to six stairs MOD I with B rails (Progressing)       Start:  02/04/24    Expected End:  02/18/24               Pain - Adult          Transfers       STG - Patient to transfer to and from sit to supine independently (Progressing)       Start:  02/04/24    Expected End:  02/18/24            STG - Patient will transfer sit to and from stand MOD I with wheeled walker (Progressing)       Start:  02/04/24    Expected End:  02/18/24

## 2024-02-06 NOTE — CARE PLAN
Problem: Skin  Goal: Decreased wound size/increased tissue granulation at next dressing change  Outcome: Progressing     Problem: Skin  Goal: Prevent/manage excess moisture  Outcome: Progressing   The patient's goals for the shift include      The clinical goals for the shift include patient will not complain of shortness of breath this shift    Over the shift, the patient did not make progress toward the following goals. Barriers to progression include patient has maintain o2 sat of 92 or above and not complained of shortness of breath. Recommendations to address these barriers include continue with plan of care.

## 2024-02-07 LAB
ANION GAP SERPL CALC-SCNC: 13 MMOL/L (ref 10–20)
BUN SERPL-MCNC: 73 MG/DL (ref 6–23)
CALCIUM SERPL-MCNC: 7.4 MG/DL (ref 8.6–10.3)
CHLORIDE SERPL-SCNC: 103 MMOL/L (ref 98–107)
CO2 SERPL-SCNC: 28 MMOL/L (ref 21–32)
CREAT SERPL-MCNC: 3.64 MG/DL (ref 0.5–1.05)
EGFRCR SERPLBLD CKD-EPI 2021: 13 ML/MIN/1.73M*2
GLUCOSE BLD MANUAL STRIP-MCNC: 120 MG/DL (ref 74–99)
GLUCOSE BLD MANUAL STRIP-MCNC: 157 MG/DL (ref 74–99)
GLUCOSE BLD MANUAL STRIP-MCNC: 187 MG/DL (ref 74–99)
GLUCOSE BLD MANUAL STRIP-MCNC: 230 MG/DL (ref 74–99)
GLUCOSE SERPL-MCNC: 155 MG/DL (ref 74–99)
POTASSIUM SERPL-SCNC: 4.4 MMOL/L (ref 3.5–5.3)
SODIUM SERPL-SCNC: 140 MMOL/L (ref 136–145)

## 2024-02-07 PROCEDURE — 2060000001 HC INTERMEDIATE ICU ROOM DAILY

## 2024-02-07 PROCEDURE — 82947 ASSAY GLUCOSE BLOOD QUANT: CPT

## 2024-02-07 PROCEDURE — 4A023N6 MEASUREMENT OF CARDIAC SAMPLING AND PRESSURE, RIGHT HEART, PERCUTANEOUS APPROACH: ICD-10-PCS | Performed by: INTERNAL MEDICINE

## 2024-02-07 PROCEDURE — 94640 AIRWAY INHALATION TREATMENT: CPT | Mod: MUE

## 2024-02-07 PROCEDURE — 2500000002 HC RX 250 W HCPCS SELF ADMINISTERED DRUGS (ALT 637 FOR MEDICARE OP, ALT 636 FOR OP/ED): Performed by: INTERNAL MEDICINE

## 2024-02-07 PROCEDURE — 2500000005 HC RX 250 GENERAL PHARMACY W/O HCPCS: Performed by: INTERNAL MEDICINE

## 2024-02-07 PROCEDURE — C1751 CATH, INF, PER/CENT/MIDLINE: HCPCS | Performed by: INTERNAL MEDICINE

## 2024-02-07 PROCEDURE — 80048 BASIC METABOLIC PNL TOTAL CA: CPT | Performed by: INTERNAL MEDICINE

## 2024-02-07 PROCEDURE — 2720000007 HC OR 272 NO HCPCS: Performed by: INTERNAL MEDICINE

## 2024-02-07 PROCEDURE — 99152 MOD SED SAME PHYS/QHP 5/>YRS: CPT | Performed by: INTERNAL MEDICINE

## 2024-02-07 PROCEDURE — 2500000002 HC RX 250 W HCPCS SELF ADMINISTERED DRUGS (ALT 637 FOR MEDICARE OP, ALT 636 FOR OP/ED)

## 2024-02-07 PROCEDURE — 2500000001 HC RX 250 WO HCPCS SELF ADMINISTERED DRUGS (ALT 637 FOR MEDICARE OP)

## 2024-02-07 PROCEDURE — 2500000004 HC RX 250 GENERAL PHARMACY W/ HCPCS (ALT 636 FOR OP/ED)

## 2024-02-07 PROCEDURE — 36415 COLL VENOUS BLD VENIPUNCTURE: CPT | Performed by: INTERNAL MEDICINE

## 2024-02-07 PROCEDURE — 99232 SBSQ HOSP IP/OBS MODERATE 35: CPT | Performed by: INTERNAL MEDICINE

## 2024-02-07 PROCEDURE — 2500000005 HC RX 250 GENERAL PHARMACY W/O HCPCS: Performed by: FAMILY MEDICINE

## 2024-02-07 PROCEDURE — 93451 RIGHT HEART CATH: CPT | Performed by: INTERNAL MEDICINE

## 2024-02-07 RX ORDER — LIDOCAINE HYDROCHLORIDE 20 MG/ML
INJECTION, SOLUTION INFILTRATION; PERINEURAL AS NEEDED
Status: DISCONTINUED | OUTPATIENT
Start: 2024-02-07 | End: 2024-02-07 | Stop reason: HOSPADM

## 2024-02-07 RX ADMIN — HEPARIN SODIUM 5000 UNITS: 5000 INJECTION INTRAVENOUS; SUBCUTANEOUS at 05:26

## 2024-02-07 RX ADMIN — AMLODIPINE BESYLATE 5 MG: 5 TABLET ORAL at 08:31

## 2024-02-07 RX ADMIN — PANTOPRAZOLE SODIUM 40 MG: 40 TABLET, DELAYED RELEASE ORAL at 08:31

## 2024-02-07 RX ADMIN — PREDNISONE 20 MG: 20 TABLET ORAL at 08:31

## 2024-02-07 RX ADMIN — INSULIN GLARGINE 16 UNITS: 100 INJECTION, SOLUTION SUBCUTANEOUS at 21:35

## 2024-02-07 RX ADMIN — INSULIN LISPRO 3 UNITS: 100 INJECTION, SOLUTION INTRAVENOUS; SUBCUTANEOUS at 08:33

## 2024-02-07 RX ADMIN — AMIODARONE HYDROCHLORIDE 200 MG: 200 TABLET ORAL at 08:31

## 2024-02-07 RX ADMIN — VENLAFAXINE HYDROCHLORIDE 75 MG: 75 CAPSULE, EXTENDED RELEASE ORAL at 08:31

## 2024-02-07 RX ADMIN — FUROSEMIDE 40 MG: 10 INJECTION, SOLUTION INTRAMUSCULAR; INTRAVENOUS at 21:33

## 2024-02-07 RX ADMIN — LEVOTHYROXINE SODIUM 200 MCG: 100 TABLET ORAL at 05:26

## 2024-02-07 RX ADMIN — CLOPIDOGREL 75 MG: 75 TABLET ORAL at 08:31

## 2024-02-07 RX ADMIN — FUROSEMIDE 40 MG: 10 INJECTION, SOLUTION INTRAMUSCULAR; INTRAVENOUS at 08:31

## 2024-02-07 RX ADMIN — METOPROLOL SUCCINATE 50 MG: 50 TABLET, EXTENDED RELEASE ORAL at 08:31

## 2024-02-07 RX ADMIN — HEPARIN SODIUM 5000 UNITS: 5000 INJECTION INTRAVENOUS; SUBCUTANEOUS at 21:34

## 2024-02-07 RX ADMIN — LEVOTHYROXINE SODIUM 50 MCG: 50 TABLET ORAL at 05:26

## 2024-02-07 RX ADMIN — ASPIRIN 81 MG: 81 TABLET, COATED ORAL at 08:31

## 2024-02-07 RX ADMIN — IPRATROPIUM BROMIDE AND ALBUTEROL SULFATE 3 ML: 2.5; .5 SOLUTION RESPIRATORY (INHALATION) at 08:55

## 2024-02-07 RX ADMIN — Medication 2 L/MIN: at 08:55

## 2024-02-07 RX ADMIN — ATORVASTATIN CALCIUM 40 MG: 40 TABLET, FILM COATED ORAL at 08:31

## 2024-02-07 RX ADMIN — PANTOPRAZOLE SODIUM 40 MG: 40 TABLET, DELAYED RELEASE ORAL at 21:33

## 2024-02-07 RX ADMIN — METOPROLOL SUCCINATE 50 MG: 50 TABLET, EXTENDED RELEASE ORAL at 21:33

## 2024-02-07 RX ADMIN — IPRATROPIUM BROMIDE AND ALBUTEROL SULFATE 3 ML: 2.5; .5 SOLUTION RESPIRATORY (INHALATION) at 20:08

## 2024-02-07 RX ADMIN — INSULIN LISPRO 3 UNITS: 100 INJECTION, SOLUTION INTRAVENOUS; SUBCUTANEOUS at 17:53

## 2024-02-07 RX ADMIN — FERROUS GLUCONATE 324 MG: 324 TABLET ORAL at 08:31

## 2024-02-07 ASSESSMENT — COGNITIVE AND FUNCTIONAL STATUS - GENERAL
WALKING IN HOSPITAL ROOM: A LOT
TOILETING: A LOT
DAILY ACTIVITIY SCORE: 11
EATING MEALS: A LOT
MOVING TO AND FROM BED TO CHAIR: A LOT
TURNING FROM BACK TO SIDE WHILE IN FLAT BAD: A LOT
HELP NEEDED FOR BATHING: A LOT
TURNING FROM BACK TO SIDE WHILE IN FLAT BAD: A LOT
HELP NEEDED FOR BATHING: A LOT
DRESSING REGULAR LOWER BODY CLOTHING: TOTAL
MOVING TO AND FROM BED TO CHAIR: A LOT
CLIMB 3 TO 5 STEPS WITH RAILING: A LOT
DRESSING REGULAR UPPER BODY CLOTHING: A LOT
DRESSING REGULAR UPPER BODY CLOTHING: A LOT
WALKING IN HOSPITAL ROOM: A LOT
EATING MEALS: A LOT
MOBILITY SCORE: 12
PERSONAL GROOMING: A LOT
MOVING FROM LYING ON BACK TO SITTING ON SIDE OF FLAT BED WITH BEDRAILS: A LOT
DRESSING REGULAR LOWER BODY CLOTHING: TOTAL
MOBILITY SCORE: 12
STANDING UP FROM CHAIR USING ARMS: A LOT
TOILETING: A LOT
STANDING UP FROM CHAIR USING ARMS: A LOT
PERSONAL GROOMING: A LOT
DAILY ACTIVITIY SCORE: 11
MOVING FROM LYING ON BACK TO SITTING ON SIDE OF FLAT BED WITH BEDRAILS: A LOT
CLIMB 3 TO 5 STEPS WITH RAILING: A LOT

## 2024-02-07 ASSESSMENT — PAIN - FUNCTIONAL ASSESSMENT
PAIN_FUNCTIONAL_ASSESSMENT: 0-10
PAIN_FUNCTIONAL_ASSESSMENT: 0-10

## 2024-02-07 ASSESSMENT — PAIN SCALES - GENERAL
PAINLEVEL_OUTOF10: 0 - NO PAIN
PAINLEVEL_OUTOF10: 0 - NO PAIN

## 2024-02-07 NOTE — PROGRESS NOTES
02/07/24 1155   Discharge Planning   Living Arrangements Alone   Support Systems Friends/neighbors   Assistance Needed Patient is alert and oriented x3, independent in ADLs but reports she struggles to get off the toilet sometimes even though she has a raised toilet seat, reports using a rollator at all times, 02 @ 3L HS   Type of Residence Private residence   Number of Stairs to Enter Residence 1   Number of Stairs Within Residence 4   Do you have animals or pets at home? Yes   Type of Animals or Pets 1 cat inside, 1 cat outside   Who is requesting discharge planning? Provider   Home or Post Acute Services In home services   Type of Home Care Services Home nursing visits;Home OT;Home PT   Patient expects to be discharged to: [facility]   Does the patient need discharge transport arranged? No   RoundTrip coordination needed? No   Has discharge transport been arranged? No        02/09/24 1041   Discharge Planning   Living Arrangements Alone   Support Systems Friends/neighbors   Assistance Needed Patient is alert and oriented x3, independent in ADLs but reports she struggles to get off the toilet sometimes even though she has a raised toilet seat, reports using a rollator at all times, 02 @ 3L HS   Type of Residence Private residence   Number of Stairs to Enter Residence 1   Number of Stairs Within Residence 4   Do you have animals or pets at home? Yes   Type of Animals or Pets 1 cat inside and 1 cat outside   Who is requesting discharge planning? Provider   Home or Post Acute Services In home services   Type of Home Care Services Home nursing visits;Home OT;Home PT   Patient expects to be discharged to: [facility] with [facility] (SN, PT, OT), patient refused SNF.   Does the patient need discharge transport arranged? No   RoundTrip coordination needed? No   Has discharge transport been arranged? No   Patient Choice   Provider Choice list and CMS website (https://medicare.gov/care-compare#search) for  post-acute Quality and Resource Measure Data were provided and reviewed with: Patient        02/12/24 1424   Discharge Planning   Living Arrangements Alone   Support Systems Friends/neighbors   Assistance Needed Patient is alert and oriented x3, independent in ADLs but reports she struggles to get off the toilet sometimes even though she has a raised toilet seat, reports using a rollator at all times, 02 @ 3L HS   Type of Residence Private residence   Number of Stairs to Enter Residence 1   Number of Stairs Within Residence 4   Do you have animals or pets at home? Yes   Type of Animals or Pets 1 cat inside and 1 cat outside   Who is requesting discharge planning? Provider   Home or Post Acute Services In home services   Patient expects to be discharged to: Home with [facility] (SN, PT, OT), patient refusing SNF   Does the patient need discharge transport arranged? No   RoundTrip coordination needed? No   Has discharge transport been arranged? No   Financial Resource Strain   How hard is it for you to pay for the very basics like food, housing, medical care, and heating? Not very   Housing Stability   In the last 12 months, was there a time when you were not able to pay the mortgage or rent on time? N   In the last 12 months, how many places have you lived? 1   In the last 12 months, was there a time when you did not have a steady place to sleep or slept in a shelter (including now)? N   Transportation Needs   In the past 12 months, has lack of transportation kept you from medical appointments or from getting medications? no   In the past 12 months, has lack of transportation kept you from meetings, work, or from getting things needed for daily living? No   Patient Choice   Provider Choice list and CMS website (https://medicare.gov/care-compare#search) for post-acute Quality and Resource Measure Data were provided and reviewed with: Patient     2/13/24 at 0956: Patient agreeable to go to SNF and selected [facility], referral sent and awaiting acceptance. Once accepted patient will require a precert.

## 2024-02-07 NOTE — PROGRESS NOTES
Daphne Morris is a 68 y.o. female on day 5 of admission presenting with CHF (congestive heart failure), NYHA class I, acute on chronic, combined (CMS/HCC).      Subjective   Feeling well       Objective     Last Recorded Vitals  /68   Pulse 65   Temp 36.8 °C (98.2 °F) (Temporal)   Resp 14   Wt 101 kg (223 lb 1.7 oz)   SpO2 94%   Intake/Output last 3 Shifts:    Intake/Output Summary (Last 24 hours) at 2/7/2024 1519  Last data filed at 2/7/2024 1431  Gross per 24 hour   Intake 360 ml   Output 1 ml   Net 359 ml       Admission Weight  Weight: 99.8 kg (220 lb 0.3 oz) (02/02/24 0404)    Daily Weight  02/07/24 : 101 kg (223 lb 1.7 oz)    Image Results  Transthoracic Echo (TTE) [facility], [address] [place] [address]                Tel [phone] and Fax [phone]    TRANSTHORACIC ECHOCARDIOGRAM REPORT       Patient Name:      DAPHNE MORRIS      Reading Physician:    [id number] Nicholas Antonio MD  Study Date:        2/5/2024             Ordering Provider:    [id number] DOMONIQUE PURCELL  MRN/PID:           [id number]             Fellow:  Accession#:        [id number]         Nurse:  Date of Birth/Age: 1955 / 68 years Sonographer:          Nina Angel                                                                [facility]  Gender:            F                    Additional Staff:  Height:            167.64 cm            Admit Date:           2/2/2024  Weight:            102.51 kg            Admission Status:     Inpatient -                                                                Routine  BSA:               2.11 m2              Encounter#:           [id number]                                          Department Location:  [facility]                                                                Invasive  Blood Pressure: 147 /65  mmHg    Study Type:    TRANSTHORACIC ECHO (TTE) COMPLETE  Diagnosis/ICD: Acute combined systolic (congestive) and diastolic (congestive)                 heart failure (CHF)-I50.41  Indication:    Congestive Heart Failure  CPT Code:      Echo Complete w Full Doppler-[id number]    Patient History:  Diabetes:         Yes  Pertinent         A-Fib, HTN, Dyspnea and LE Edema. Watchman device, elevated  History:          BNP,.    Study Detail: The following Echo studies were performed: 2D, M-Mode, Doppler and                color flow.       PHYSICIAN INTERPRETATION:  Left Ventricle: The left ventricular systolic function is normal, with an estimated ejection fraction of 60-65%. There are no regional wall motion abnormalities. The left ventricular cavity size is normal. Spectral Doppler shows an impaired relaxation pattern of left ventricular diastolic filling.  Left Atrium: The left atrium is moderately dilated.  Right Ventricle: The right ventricle is mildly enlarged. There is mildly reduced right ventricular systolic function.  Right Atrium: The right atrium is moderately dilated.  Aortic Valve: The aortic valve is trileaflet. There is mild aortic valve cusp calcification. There is trivial aortic valve regurgitation. The peak instantaneous gradient of the aortic valve is 14.1 mmHg. The mean gradient of the aortic valve is 9.1 mmHg.  Mitral Valve: The mitral valve is normal in structure. There is mild mitral annular calcification. There is mild to moderate mitral valve regurgitation.  Tricuspid Valve: The tricuspid valve is structurally normal. There is moderate tricuspid regurgitation.  Pulmonic Valve: The pulmonic valve is structurally normal. There is mild pulmonic valve regurgitation.  Pericardium: There is a small to moderate pericardial effusion posterior to the left ventricle. There is no evidence of cardiac tamponade.  Aorta: The aortic root is normal.  Pulmonary Artery: The tricuspid regurgitant velocity is 3.04 m/s,  and with an estimated right atrial pressure of 3 mmHg, the estimated pulmonary artery pressure is mildly elevated with the RVSP at 39.9 mmHg.  Pulmonary Veins: The pulmonary veins appear normal and return normally to the left atrium.  Systemic Veins: The inferior vena cava appears to be of normal size. There is IVC inspiratory collapse greater than 50%.       CONCLUSIONS:   1. Left ventricular systolic function is normal with a 60-65% estimated ejection fraction.   2. Spectral Doppler shows an impaired relaxation pattern of left ventricular diastolic filling.   3. There is mildly reduced right ventricular systolic function.   4. The left atrium is moderately dilated.   5. The right atrium is moderately dilated.   6. There is a small to moderate pericardial effusion.   7. There is no evidence of cardiac tamponade.   8. Mild to moderate mitral valve regurgitation.   9. Moderate tricuspid regurgitation visualized.  10. Normal CVP. Estimated PASP around 45 mm Hg.    QUANTITATIVE DATA SUMMARY:  2D MEASUREMENTS:                            Normal Ranges:  IVSd:          1.52 cm    (0.6-1.1cm)  LVPWd:         1.42 cm    (0.6-1.1cm)  LVIDd:         4.35 cm    (3.9-5.9cm)  LVIDs:         2.94 cm  LV Mass Index: 120.7 g/m2  LV % FS        32.3 %    LA VOLUME:                               Normal Ranges:  LA Vol A4C:       79.5 ml    (22+/-6mL/m2)  LA Vol A2C:       78.2 ml  LA Vol BP:        79.7 ml  LA Vol Index A4C: 37.7ml/m2  LA Vol Index A2C: 37.1 ml/m2  LA Vol Index BP:  37.9 ml/m2  LA Volume Index:  37.8 ml/m2  LA Vol A4C:       76.0 ml  LA Vol A2C:       73.9 ml    RA VOLUME BY A/L METHOD:                        Normal Ranges:  RA Area A4C: 17.8 cm2    M-MODE MEASUREMENTS:                   Normal Ranges:  Ao Root: 3.00 cm (2.0-3.7cm)  LAs:     5.08 cm (2.7-4.0cm)    LV SYSTOLIC FUNCTION BY 2D PLANIMETRY (MOD):                      Normal Ranges:  EF-A4C View: 63.4 % (>=55%)  EF-A2C View: 63.8 %  EF-Biplane:  62.5  %    LV DIASTOLIC FUNCTION:                              Normal Ranges:  MV Peak E:      1.33 m/s    (0.7-1.2 m/s)  MV Peak A:      0.53 m/s    (0.42-0.7 m/s)  E/A Ratio:      2.52        (1.0-2.2)  MV e'           0.06 m/s    (>8.0)  MV lateral e'   0.06 m/s  MV medial e'    0.06 m/s  MV A Dur:       110.73 msec  E/e' Ratio:     22.09       (<8.0)  PulmV Sys Mike:  53.97 cm/s  PulmV Dugan Mike: 77.63 cm/s  PulmV S/D Mike:  0.70    MITRAL VALVE:                  Normal Ranges:  MV DT: 245 msec (150-240msec)    MITRAL INSUFFICIENCY:                          Normal Ranges:  MR VTI:     171.38 cm  MR Vmax:    489.73 cm/s  MR Volume:  20.07 ml  MR Flow Rt: 57.36 ml/s  MR EROA:    0.12 cm2    AORTIC VALVE:                                     Normal Ranges:  AoV Vmax:                1.88 m/s  (<=1.7m/s)  AoV Peak P.1 mmHg (<20mmHg)  AoV Mean P.1 mmHg  (1.7-11.5mmHg)  LVOT Max Mike:            0.97 m/s  (<=1.1m/s)  AoV VTI:                 46.22 cm  (18-25cm)  LVOT VTI:                24.89 cm  LVOT Diameter:           1.96 cm   (1.8-2.4cm)  AoV Area, VTI:           1.62 cm2  (2.5-5.5cm2)  AoV Area,Vmax:           1.56 cm2  (2.5-4.5cm2)  AoV Dimensionless Index: 0.54       RIGHT VENTRICLE:  RV Basal 3.80 cm  RV Mid   2.80 cm  RV Major 8.0 cm  TAPSE:   18.0 mm  RV s'    0.13 m/s    TRICUSPID VALVE/RVSP:                              Normal Ranges:  Peak TR Velocity: 3.04 m/s  RV Syst Pressure: 39.9 mmHg (< 30mmHg)    PULMONIC VALVE:                       Normal Ranges:  PV Max Mike: 1.0 m/s  (0.6-0.9m/s)  PV Max PG:  3.9 mmHg    Pulmonary Veins:  PulmV Dugan Mike: 77.63 cm/s  PulmV S/D Mike:  0.70  PulmV Sys Mike:  53.97 cm/s       [id number] Nicholas Antonio MD  Electronically signed on 2024 at 5:06:05 PM       ** Final **    No current facility-administered medications on file prior to encounter.     Current Outpatient Medications on File Prior to Encounter   Medication Sig Dispense Refill     "albuterol 90 mcg/actuation inhaler Inhale 2 puffs every 4 hours if needed.      amiodarone (Pacerone) 200 mg tablet Take 1 tablet (200 mg) by mouth once daily with a meal.      amLODIPine (Norvasc) 5 mg tablet Take 1 tablet (5 mg) by mouth once daily.      aspirin 81 mg EC tablet Take 1 tablet (81 mg) by mouth once daily.      atorvastatin (Lipitor) 40 mg tablet Take 1 tablet (40 mg) by mouth once daily.      BD Calista 2nd Gen Pen Needle 32 gauge x 5/32\" needle USE SUBCUTANEOUSLY AS DIRECTED TWICE DAILY      clopidogrel (Plavix) 75 mg tablet Take 1 tablet (75 mg) by mouth once daily.      ferrous gluconate 324 (38 Fe) MG tablet Take 1 tablet (324 mg) by mouth once daily with breakfast. 30 tablet 3    FreeStyle Shakir 14 Day Sensor kit USE AS DIRECTED TO CHECK SUGARS 3 TO 4 TIMES DAILY      FreeStyle Lite Strips strip twice a day.      HumaLOG KwikPen Insulin 100 unit/mL injection up to 15 units Subcutaneous three times a day per sliding scale      ipratropium-albuteroL (Duo-Neb) 0.5-2.5 mg/3 mL nebulizer solution Take 3 mL by nebulization every 6 hours if needed.      Lantus Solostar U-100 Insulin 100 unit/mL (3 mL) pen Inject 14 Units under the skin once daily at bedtime.      levothyroxine (Synthroid, Levoxyl) 200 mcg tablet Take 1 tablet (200 mcg) by mouth once daily in the morning. Take before meals. 90 tablet 3    levothyroxine (Synthroid, Levoxyl) 50 mcg tablet Take 1 tablet (50 mcg) by mouth once daily in the morning. Take before meals.      metoprolol succinate XL (Toprol-XL) 50 mg 24 hr tablet TAKE ONE TABLET BY MOUTH TWO TIMES A  tablet 1    oxygen (O2) gas therapy 2 l/min nasal cannula      pantoprazole (ProtoNix) 40 mg EC tablet Take 1 tablet (40 mg) by mouth 2 times a day. 60 tablet 6    potassium chloride CR 20 mEq ER tablet TAKE ONE TABLET BY MOUTH EVERY DAY WITH FOOD 90 tablet 1    SITagliptin phosphate (Januvia) 100 mg tablet Take 1 tablet (100 mg) by mouth once daily.      Soaanz 60 mg tablet " Take 1 tablet by mouth once daily. 30 tablet 3    venlafaxine XR (Effexor-XR) 75 mg 24 hr capsule Take 1 capsule (75 mg) by mouth once daily.        Results for orders placed or performed during the hospital encounter of 02/02/24 (from the past 24 hour(s))   POCT GLUCOSE   Result Value Ref Range    POCT Glucose 224 (H) 74 - 99 mg/dL   POCT GLUCOSE   Result Value Ref Range    POCT Glucose 198 (H) 74 - 99 mg/dL   Basic Metabolic Panel   Result Value Ref Range    Glucose 155 (H) 74 - 99 mg/dL    Sodium 140 136 - 145 mmol/L    Potassium 4.4 3.5 - 5.3 mmol/L    Chloride 103 98 - 107 mmol/L    Bicarbonate 28 21 - 32 mmol/L    Anion Gap 13 10 - 20 mmol/L    Urea Nitrogen 73 (H) 6 - 23 mg/dL    Creatinine 3.64 (H) 0.50 - 1.05 mg/dL    eGFR 13 (L) >60 mL/min/1.73m*2    Calcium 7.4 (L) 8.6 - 10.3 mg/dL   POCT GLUCOSE   Result Value Ref Range    POCT Glucose 157 (H) 74 - 99 mg/dL   POCT GLUCOSE   Result Value Ref Range    POCT Glucose 120 (H) 74 - 99 mg/dL        Physical Exam  Constitutional:       Appearance: Normal appearance.   HENT:      Head: Normocephalic.      Nose: Nose normal.      Mouth/Throat:      Mouth: Mucous membranes are dry.   Eyes:      Extraocular Movements: Extraocular movements intact.      Pupils: Pupils are equal, round, and reactive to light.   Neck:      Comments: Cath site on right, no ecchymoses, no tenderness  No jvd  Cardiovascular:      Rate and Rhythm: Normal rate and regular rhythm.      Pulses: Normal pulses.   Pulmonary:      Effort: Pulmonary effort is normal.      Breath sounds: Normal breath sounds.      Comments: Decreased breath sounds in bases right more than left  Abdominal:      General: Bowel sounds are normal.      Palpations: Abdomen is soft.   Musculoskeletal:         General: Normal range of motion.      Cervical back: Neck supple.   Skin:     General: Skin is warm and dry.   Neurological:      General: No focal deficit present.      Mental Status: She is alert.      Comments:  Generalized weakness   Psychiatric:         Mood and Affect: Mood normal.         Behavior: Behavior normal.       Relevant Results         Assessment/Plan        Principal Problem:    CHF (congestive heart failure), NYHA class I, acute on chronic, combined (CMS/HCC)    Acute on chronic chf. Improved. Results of RHC reviewed.   Hypertension. Somewhat labile, monitor  Anemia, status post transfusion. Stable hgb  DM. Fairly stable, monitor  CKD  Hx chronic respiratory failure, on 1.5 liters usually  Possible dc tomorrow              Aljeandra Marshall MD

## 2024-02-07 NOTE — NURSING NOTE
Pt made NPO for Right Heart Cath tomorrow, pt educated on diet and procedure for tomorrow, pt showed verbal understanding of teaching

## 2024-02-07 NOTE — CARE PLAN
The patient's goals for the shift include      The clinical goals for the shift include Pt will remain above 92% this shift    Over the shift, the patient did not make progress toward the following goals. Barriers to progression include the patient. Recommendations to address these barriers include educate patient on respiratory care.

## 2024-02-07 NOTE — PROGRESS NOTES
Daphne Morris is a 68 y.o. female on day 5 of admission presenting with CHF (congestive heart failure), NYHA class I, acute on chronic, combined (CMS/HCC).      Subjective   She is sitting up in the bed, now s/p RHC today. Still very edematous, slight shortness of breath.     RHC showed MEAN PA 30 MILD PULMONARY HTN MEAN RA 9 MM HG MEAN PCWP 23 MM HG CARDIAC OUTPUT 8.03 CI 3.79 NO SHUNTS     Review of systems:  Constitutional: negative for fever, chills, or malaise  Neuro: negative for dizziness, headache, numbness, tingling  ENT: Negative for nasal congestion or sore throat  CV: negative for chest pain, palpitations  GI: negative for abd pain, nausea, vomiting or diarrhea  : negative for dysuria, frequency, or urgency  Musculoskeletal: Negative for weakness, myalgia, or arthralgia  Endocrine: Negative for polyuria or polydipsia         Objective   Constitutional: Well developed, awake/alert/oriented x3, no distress, alert and cooperative  Eyes: PERRL, EOMI, clear sclera  ENMT: mucous membranes moist, no apparent injury, no lesions seen  Head/Neck: Neck supple, no apparent injury, thyroid without mass or tenderness, No JVD, trachea midline, no bruits  Respiratory/Thorax: Patent airways, CTAB, diminished and crackles in bases, breath sounds with good chest expansion, thorax symmetric  Cardiovascular: Regular, rate and rhythm, no murmurs, 2+ equal pulses of the extremities, normal S 1and S 2  Gastrointestinal: Nondistended, soft, non-tender, no rebound tenderness or guarding, no masses palpable, no organomegaly, +BS, no bruits  Musculoskeletal: ROM intact, no joint swelling, normal strength  Extremities:  2+ pitting edema BUE and lower extremities  Neurological: alert and oriented x3, intact senses, motor, response and reflexes, normal strength  Lymphatic: No significant lymphadenopathy  Psychological: Appropriate mood and behavior  Skin: Warm and dry, no lesions, no rashes      Last Recorded Vitals  /68    "Pulse 65   Temp 36.8 °C (98.2 °F) (Temporal)   Resp 14   Ht 1.676 m (5' 6\")   Wt 101 kg (223 lb 1.7 oz)   SpO2 94%   BMI 36.01 kg/m²     Intake/Output last 3 Shifts:  I/O last 3 completed shifts:  In: 600 (5.9 mL/kg) [P.O.:600]  Out: 800 (7.9 mL/kg) [Urine:800 (0.2 mL/kg/hr)]  Weight: 101.2 kg   I/O this shift:  In: 120 [P.O.:120]  Out: 1 [Blood:1]    Relevant Results  Scheduled medications  amiodarone, 200 mg, oral, Daily with breakfast  amLODIPine, 5 mg, oral, Daily  aspirin, 81 mg, oral, Daily  atorvastatin, 40 mg, oral, Daily  clopidogrel, 75 mg, oral, Daily  ferrous gluconate, 324 mg, oral, Daily with breakfast  furosemide, 40 mg, intravenous, q12h  heparin (porcine), 5,000 Units, subcutaneous, q8h  insulin glargine, 16 Units, subcutaneous, Nightly  insulin lispro, 0-15 Units, subcutaneous, TID with meals  ipratropium-albuteroL, 3 mL, nebulization, TID  levothyroxine, 200 mcg, oral, Daily before breakfast  levothyroxine, 50 mcg, oral, Daily before breakfast  metoprolol succinate XL, 50 mg, oral, BID  pantoprazole, 40 mg, oral, BID  [Held by provider] potassium chloride CR, 20 mEq, oral, Daily  predniSONE, 20 mg, oral, Daily  [Held by provider] SITagliptin phosphate, 25 mg, oral, Daily  venlafaxine XR, 75 mg, oral, Daily      Continuous medications     PRN medications  PRN medications: acetaminophen, dextrose 10 % in water (D10W), dextrose, glucagon, HYDROmorphone, ipratropium-albuteroL, oxygen, oxygen, oxygen    Results for orders placed or performed during the hospital encounter of 02/02/24 (from the past 24 hour(s))   POCT GLUCOSE   Result Value Ref Range    POCT Glucose 198 (H) 74 - 99 mg/dL   Basic Metabolic Panel   Result Value Ref Range    Glucose 155 (H) 74 - 99 mg/dL    Sodium 140 136 - 145 mmol/L    Potassium 4.4 3.5 - 5.3 mmol/L    Chloride 103 98 - 107 mmol/L    Bicarbonate 28 21 - 32 mmol/L    Anion Gap 13 10 - 20 mmol/L    Urea Nitrogen 73 (H) 6 - 23 mg/dL    Creatinine 3.64 (H) 0.50 - 1.05 " mg/dL    eGFR 13 (L) >60 mL/min/1.73m*2    Calcium 7.4 (L) 8.6 - 10.3 mg/dL   POCT GLUCOSE   Result Value Ref Range    POCT Glucose 157 (H) 74 - 99 mg/dL   POCT GLUCOSE   Result Value Ref Range    POCT Glucose 120 (H) 74 - 99 mg/dL       Transthoracic Echo (TTE) Complete   Final Result      XR chest 1 view   Final Result   Marked interval improvement in the right pleural effusion with   interval thoracentesis. No pneumothorax.        Pulmonary vascular congestion        Minimal atelectasis right lung base        MACRO:   None.        Signed by: Rayna Carson 2/2/2024 3:23 PM   Dictation workstation:   [id number]      XR knee left 4+ views   Final Result   1. Small joint effusion and degenerative change of the knee without   osseous injury evident. Knowledge of any trauma may be helpful. If   there is persistent concern for osseous injury, cross-sectional   imaging can be considered.   2. Reticular increased density in the subcutaneous tissues which may   represent lymphedema and/or cellulitis.        MACRO:   None        Signed by: Adama Jonas 2/2/2024 4:24 PM   Dictation workstation:   [id number]      US renal complete   Final Result   No left hydronephrosis. Right total nephrectomy.        MACRO:   None        Signed by: Amando Starks 2/2/2024 2:36 PM   Dictation workstation:   [id number]      Cardiac catheterization - non-coronary    (Results Pending)       Transthoracic Echo (TTE) Complete    Result Date: 2/5/2024   [facility], [address] [place] [address]               Tel [phone] and Fax [phone] TRANSTHORACIC ECHOCARDIOGRAM REPORT  Patient Name:      MOE Lizarraga Physician:    [id number] Nicholas Antonio MD Study Date:        2/5/2024             Ordering Provider:    [id number] DOMONIQUE PURCELL MRN/PID:           [id number]              Fellow: Accession#:        [id number]         Nurse: Date of Birth/Age: 1955 / 68 years Sonographer:          Nina Angel                                                               RD Gender:            F                    Additional Staff: Height:            167.64 cm            Admit Date:           2/2/2024 Weight:            102.51 kg            Admission Status:     Inpatient -                                                               Routine BSA:               2.11 m2              Encounter#:           [id number]                                         Department Location:  [facility]                                                               Invasive Blood Pressure: 147 /65 mmHg Study Type:    TRANSTHORACIC ECHO (TTE) COMPLETE Diagnosis/ICD: Acute combined systolic (congestive) and diastolic (congestive)                heart failure (CHF)-I50.41 Indication:    Congestive Heart Failure CPT Code:      Echo Complete w Full Doppler-40647 Patient History: Diabetes:         Yes Pertinent         A-Fib, HTN, Dyspnea and LE Edema. Watchman device, elevated History:          BNP,. Study Detail: The following Echo studies were performed: 2D, M-Mode, Doppler and               color flow.  PHYSICIAN INTERPRETATION: Left Ventricle: The left ventricular systolic function is normal, with an estimated ejection fraction of 60-65%. There are no regional wall motion abnormalities. The left ventricular cavity size is normal. Spectral Doppler shows an impaired relaxation pattern of left ventricular diastolic filling. Left Atrium: The left atrium is moderately dilated. Right Ventricle: The right ventricle is mildly enlarged. There is mildly reduced right ventricular systolic function. Right Atrium: The right atrium is moderately dilated. Aortic Valve: The aortic valve is trileaflet. There is mild aortic valve cusp calcification. There is trivial aortic valve regurgitation. The peak instantaneous  gradient of the aortic valve is 14.1 mmHg. The mean gradient of the aortic valve is 9.1 mmHg. Mitral Valve: The mitral valve is normal in structure. There is mild mitral annular calcification. There is mild to moderate mitral valve regurgitation. Tricuspid Valve: The tricuspid valve is structurally normal. There is moderate tricuspid regurgitation. Pulmonic Valve: The pulmonic valve is structurally normal. There is mild pulmonic valve regurgitation. Pericardium: There is a small to moderate pericardial effusion posterior to the left ventricle. There is no evidence of cardiac tamponade. Aorta: The aortic root is normal. Pulmonary Artery: The tricuspid regurgitant velocity is 3.04 m/s, and with an estimated right atrial pressure of 3 mmHg, the estimated pulmonary artery pressure is mildly elevated with the RVSP at 39.9 mmHg. Pulmonary Veins: The pulmonary veins appear normal and return normally to the left atrium. Systemic Veins: The inferior vena cava appears to be of normal size. There is IVC inspiratory collapse greater than 50%.  CONCLUSIONS:  1. Left ventricular systolic function is normal with a 60-65% estimated ejection fraction.  2. Spectral Doppler shows an impaired relaxation pattern of left ventricular diastolic filling.  3. There is mildly reduced right ventricular systolic function.  4. The left atrium is moderately dilated.  5. The right atrium is moderately dilated.  6. There is a small to moderate pericardial effusion.  7. There is no evidence of cardiac tamponade.  8. Mild to moderate mitral valve regurgitation.  9. Moderate tricuspid regurgitation visualized. 10. Normal CVP. Estimated PASP around 45 mm Hg. QUANTITATIVE DATA SUMMARY: 2D MEASUREMENTS:                           Normal Ranges: IVSd:          1.52 cm    (0.6-1.1cm) LVPWd:         1.42 cm    (0.6-1.1cm) LVIDd:         4.35 cm    (3.9-5.9cm) LVIDs:         2.94 cm LV Mass Index: 120.7 g/m2 LV % FS        32.3 % LA VOLUME:                               Normal Ranges: LA Vol A4C:       79.5 ml    (22+/-6mL/m2) LA Vol A2C:       78.2 ml LA Vol BP:        79.7 ml LA Vol Index A4C: 37.7ml/m2 LA Vol Index A2C: 37.1 ml/m2 LA Vol Index BP:  37.9 ml/m2 LA Volume Index:  37.8 ml/m2 LA Vol A4C:       76.0 ml LA Vol A2C:       73.9 ml RA VOLUME BY A/L METHOD:                       Normal Ranges: RA Area A4C: 17.8 cm2 M-MODE MEASUREMENTS:                  Normal Ranges: Ao Root: 3.00 cm (2.0-3.7cm) LAs:     5.08 cm (2.7-4.0cm) LV SYSTOLIC FUNCTION BY 2D PLANIMETRY (MOD):                     Normal Ranges: EF-A4C View: 63.4 % (>=55%) EF-A2C View: 63.8 % EF-Biplane:  62.5 % LV DIASTOLIC FUNCTION:                             Normal Ranges: MV Peak E:      1.33 m/s    (0.7-1.2 m/s) MV Peak A:      0.53 m/s    (0.42-0.7 m/s) E/A Ratio:      2.52        (1.0-2.2) MV e'           0.06 m/s    (>8.0) MV lateral e'   0.06 m/s MV medial e'    0.06 m/s MV A Dur:       110.73 msec E/e' Ratio:     22.09       (<8.0) PulmV Sys Mike:  53.97 cm/s PulmV Dugan Mike: 77.63 cm/s PulmV S/D Mike:  0.70 MITRAL VALVE:                 Normal Ranges: MV DT: 245 msec (150-240msec) MITRAL INSUFFICIENCY:                         Normal Ranges: MR VTI:     171.38 cm MR Vmax:    489.73 cm/s MR Volume:  20.07 ml MR Flow Rt: 57.36 ml/s MR EROA:    0.12 cm2 AORTIC VALVE:                                    Normal Ranges: AoV Vmax:                1.88 m/s  (<=1.7m/s) AoV Peak P.1 mmHg (<20mmHg) AoV Mean P.1 mmHg  (1.7-11.5mmHg) LVOT Max Mike:            0.97 m/s  (<=1.1m/s) AoV VTI:                 46.22 cm  (18-25cm) LVOT VTI:                24.89 cm LVOT Diameter:           1.96 cm   (1.8-2.4cm) AoV Area, VTI:           1.62 cm2  (2.5-5.5cm2) AoV Area,Vmax:           1.56 cm2  (2.5-4.5cm2) AoV Dimensionless Index: 0.54  RIGHT VENTRICLE: RV Basal 3.80 cm RV Mid   2.80 cm RV Major 8.0 cm TAPSE:   18.0 mm RV s'    0.13 m/s TRICUSPID VALVE/RVSP:                              Normal Ranges: Peak TR Velocity: 3.04 m/s RV Syst Pressure: 39.9 mmHg (< 30mmHg) PULMONIC VALVE:                      Normal Ranges: PV Max Mike: 1.0 m/s  (0.6-0.9m/s) PV Max PG:  3.9 mmHg Pulmonary Veins: PulmV Dugan Mike: 77.63 cm/s PulmV S/D Mike:  0.70 PulmV Sys Mike:  53.97 cm/s  [id number] Nicholas Antonio MD Electronically signed on 2/5/2024 at 5:06:05 PM  ** Final **           Assessment/Plan   Principal Problem:    CHF (congestive heart failure), NYHA class I, acute on chronic, combined (CMS/HCC)    Echocardiogram from August 2023 (post watchman) reviewed: LVEF 55%, small to moderate pericardial effusions         1. Acute on chronic hypoxic/hypercapnic respiratory failure 2/2 acute on chronic diastolic CHF and pneumonia, COPD exac, Pleural effusion  -CXR showed large right pleural effusion  -CTA of the chest showed large right pleural effusion with complete collapse of the right lower lung, pneumonia, a small pericardial effusion, and soft tissue edema at the chest and abd wall.   -antibiotics  -bronchodilators  -Steroids  -oxygen support/Bipap  -sputum culture  -blood cultures  -Still with ignificant leg to abd swelling and shortness of breath  -BNP 1786  -Strict I & Os  -Daily weights  -2gm na diet  -Repeat echo as above  -Unable to start SGLT2 inhibitors with current GFR  -Cont Lasix IV  -Pulmonary following, s/p thoracentesis 2.3L  -s/p RHC today showed MEAN PA 30 MILD PULMONARY HTN MEAN RA 9 MM HG MEAN PCWP 23 MM HG CARDIAC OUTPUT 8.03 CI 3.79 NO SHUNTS   -Discussed with Dr. Peterson->may need dialysis vs high dose Lasix PO (80-120mg BID) depending on what nephrology suggests  -Monitor on tele     2. Elevated troponins-non MI elevation  -Denies chest pain  -Does have shortness of breath-worsening for weeks  -Had a LHC planned 2 weeks ago->unable to lay flat  -Cont asa, plavix, statin, metoprolol  -Unable to do LHC due to Creatinine 3.6  -Monitor on tele     3. Paroxysmal atrial fibrillation with RVR  -s/p  [place] in Aug 2023  -Currently SB  -Cont amiodarone and metoprolol for rate control  -Monitor on tele     4. Hypertension  -stable  -2gm na diet  -cont meds  -monitor     5. Anemia  -Appear stable     6. Acute on Chronic kidney disease  -s/p nephrectomy  -Renal consulted, note reviewed, discussed with Dr. Gaspar  -Renal US negative  -Monitor closely on Lasix IV  -may need dialysis vs high dose Lasix PO (80-120mg BID) depending on what nephrology suggests     7. Diabetes  -ISS  -Accuchecks     8. Hypothyroidism  -Cont synthroid     9. Pericardial effusion  -Mild to moderate on echo  -Cont diuresis      Kacey Monique, APRN-CNP

## 2024-02-07 NOTE — POST-PROCEDURE NOTE
Physician Transition of Care Summary  Invasive Cardiovascular Lab    Procedure Date: 2/7/2024  Attending:    Jayla Antonio - Primary  Resident/Fellow/Other Assistant: Surgeon(s) and Role:    Indications:   Pre-op Diagnosis     * CHF (congestive heart failure), NYHA class I, acute on chronic, combined (CMS/HCC) [I50.43]    Post-procedure diagnosis:   Post-op Diagnosis     * CHF (congestive heart failure), NYHA class I, acute on chronic, combined (CMS/HCC) [I50.43]    Procedure(s):   Right Heart Cath  50361 - TN RIGHT HEART CATH O2 SATURATION & CARDIAC OUTPUT        Procedure Findings:   MEAN PA 30 MILD PULMONARY HTN MEAN RA 9 MM HG MEAN PCWP 23 MM HG CARDIAC OUTPUT 8.03 CI 3.79 NO SHUNTS    Description of the Procedure:   RHC    Complications:   NONE      Anticoagulation/Antiplatelet Plan:   NA    Estimated Blood Loss:   * No values recorded between 2/7/2024  2:00 PM and 2/7/2024  2:31 PM *    Anesthesia: Moderate Sedation Anesthesia Staff: No anesthesia staff entered.    Any Specimen(s) Removed:   No specimens collected during this procedure.    Disposition:   FLOOR      Electronically signed by: Nicholas Antonio MD, 2/7/2024 2:31 PM

## 2024-02-08 LAB
ANION GAP SERPL CALC-SCNC: 11 MMOL/L (ref 10–20)
BUN SERPL-MCNC: 74 MG/DL (ref 6–23)
CALCIUM SERPL-MCNC: 7.4 MG/DL (ref 8.6–10.3)
CHLORIDE SERPL-SCNC: 102 MMOL/L (ref 98–107)
CO2 SERPL-SCNC: 31 MMOL/L (ref 21–32)
CREAT SERPL-MCNC: 3.58 MG/DL (ref 0.5–1.05)
EGFRCR SERPLBLD CKD-EPI 2021: 13 ML/MIN/1.73M*2
GLUCOSE BLD MANUAL STRIP-MCNC: 120 MG/DL (ref 74–99)
GLUCOSE BLD MANUAL STRIP-MCNC: 124 MG/DL (ref 74–99)
GLUCOSE BLD MANUAL STRIP-MCNC: 163 MG/DL (ref 74–99)
GLUCOSE BLD MANUAL STRIP-MCNC: 266 MG/DL (ref 74–99)
GLUCOSE SERPL-MCNC: 120 MG/DL (ref 74–99)
LAB AP ASR DISCLAIMER: NORMAL
LABORATORY COMMENT REPORT: NORMAL
LABORATORY COMMENT REPORT: NORMAL
PATH REPORT.FINAL DX SPEC: NORMAL
PATH REPORT.GROSS SPEC: NORMAL
PATH REPORT.RELEVANT HX SPEC: NORMAL
PATH REPORT.TOTAL CANCER: NORMAL
POTASSIUM SERPL-SCNC: 4.1 MMOL/L (ref 3.5–5.3)
SODIUM SERPL-SCNC: 140 MMOL/L (ref 136–145)

## 2024-02-08 PROCEDURE — 82947 ASSAY GLUCOSE BLOOD QUANT: CPT

## 2024-02-08 PROCEDURE — 2500000005 HC RX 250 GENERAL PHARMACY W/O HCPCS: Performed by: FAMILY MEDICINE

## 2024-02-08 PROCEDURE — 2500000004 HC RX 250 GENERAL PHARMACY W/ HCPCS (ALT 636 FOR OP/ED)

## 2024-02-08 PROCEDURE — 80048 BASIC METABOLIC PNL TOTAL CA: CPT | Performed by: NURSE PRACTITIONER

## 2024-02-08 PROCEDURE — 2500000002 HC RX 250 W HCPCS SELF ADMINISTERED DRUGS (ALT 637 FOR MEDICARE OP, ALT 636 FOR OP/ED)

## 2024-02-08 PROCEDURE — 99232 SBSQ HOSP IP/OBS MODERATE 35: CPT | Performed by: INTERNAL MEDICINE

## 2024-02-08 PROCEDURE — 2060000001 HC INTERMEDIATE ICU ROOM DAILY

## 2024-02-08 PROCEDURE — 2500000001 HC RX 250 WO HCPCS SELF ADMINISTERED DRUGS (ALT 637 FOR MEDICARE OP)

## 2024-02-08 PROCEDURE — 97535 SELF CARE MNGMENT TRAINING: CPT | Mod: GO,CO

## 2024-02-08 PROCEDURE — 36415 COLL VENOUS BLD VENIPUNCTURE: CPT | Performed by: NURSE PRACTITIONER

## 2024-02-08 PROCEDURE — 2500000002 HC RX 250 W HCPCS SELF ADMINISTERED DRUGS (ALT 637 FOR MEDICARE OP, ALT 636 FOR OP/ED): Performed by: INTERNAL MEDICINE

## 2024-02-08 PROCEDURE — 2500000004 HC RX 250 GENERAL PHARMACY W/ HCPCS (ALT 636 FOR OP/ED): Performed by: INTERNAL MEDICINE

## 2024-02-08 PROCEDURE — 94640 AIRWAY INHALATION TREATMENT: CPT | Mod: MUE

## 2024-02-08 PROCEDURE — 2500000001 HC RX 250 WO HCPCS SELF ADMINISTERED DRUGS (ALT 637 FOR MEDICARE OP): Performed by: INTERNAL MEDICINE

## 2024-02-08 RX ORDER — ACETAMINOPHEN 500 MG
5 TABLET ORAL NIGHTLY PRN
Status: DISCONTINUED | OUTPATIENT
Start: 2024-02-08 | End: 2024-02-15 | Stop reason: HOSPADM

## 2024-02-08 RX ORDER — FUROSEMIDE 10 MG/ML
80 INJECTION INTRAMUSCULAR; INTRAVENOUS EVERY 12 HOURS
Status: DISCONTINUED | OUTPATIENT
Start: 2024-02-08 | End: 2024-02-15

## 2024-02-08 RX ADMIN — LEVOTHYROXINE SODIUM 200 MCG: 100 TABLET ORAL at 05:36

## 2024-02-08 RX ADMIN — IPRATROPIUM BROMIDE AND ALBUTEROL SULFATE 3 ML: 2.5; .5 SOLUTION RESPIRATORY (INHALATION) at 14:41

## 2024-02-08 RX ADMIN — IPRATROPIUM BROMIDE AND ALBUTEROL SULFATE 3 ML: 2.5; .5 SOLUTION RESPIRATORY (INHALATION) at 19:42

## 2024-02-08 RX ADMIN — METOPROLOL SUCCINATE 50 MG: 50 TABLET, EXTENDED RELEASE ORAL at 20:47

## 2024-02-08 RX ADMIN — VENLAFAXINE HYDROCHLORIDE 75 MG: 75 CAPSULE, EXTENDED RELEASE ORAL at 08:54

## 2024-02-08 RX ADMIN — AMLODIPINE BESYLATE 5 MG: 5 TABLET ORAL at 08:54

## 2024-02-08 RX ADMIN — AMIODARONE HYDROCHLORIDE 200 MG: 200 TABLET ORAL at 08:54

## 2024-02-08 RX ADMIN — ATORVASTATIN CALCIUM 40 MG: 40 TABLET, FILM COATED ORAL at 08:54

## 2024-02-08 RX ADMIN — HEPARIN SODIUM 5000 UNITS: 5000 INJECTION INTRAVENOUS; SUBCUTANEOUS at 20:47

## 2024-02-08 RX ADMIN — ASPIRIN 81 MG: 81 TABLET, COATED ORAL at 08:54

## 2024-02-08 RX ADMIN — PANTOPRAZOLE SODIUM 40 MG: 40 TABLET, DELAYED RELEASE ORAL at 08:54

## 2024-02-08 RX ADMIN — Medication 5 MG: at 20:47

## 2024-02-08 RX ADMIN — PREDNISONE 20 MG: 20 TABLET ORAL at 08:54

## 2024-02-08 RX ADMIN — HEPARIN SODIUM 5000 UNITS: 5000 INJECTION INTRAVENOUS; SUBCUTANEOUS at 12:46

## 2024-02-08 RX ADMIN — INSULIN GLARGINE 16 UNITS: 100 INJECTION, SOLUTION SUBCUTANEOUS at 20:47

## 2024-02-08 RX ADMIN — Medication 5 MG: at 00:17

## 2024-02-08 RX ADMIN — FERROUS GLUCONATE 324 MG: 324 TABLET ORAL at 08:54

## 2024-02-08 RX ADMIN — FUROSEMIDE 80 MG: 10 INJECTION, SOLUTION INTRAMUSCULAR; INTRAVENOUS at 20:47

## 2024-02-08 RX ADMIN — HEPARIN SODIUM 5000 UNITS: 5000 INJECTION INTRAVENOUS; SUBCUTANEOUS at 05:36

## 2024-02-08 RX ADMIN — PANTOPRAZOLE SODIUM 40 MG: 40 TABLET, DELAYED RELEASE ORAL at 20:47

## 2024-02-08 RX ADMIN — INSULIN LISPRO 3 UNITS: 100 INJECTION, SOLUTION INTRAVENOUS; SUBCUTANEOUS at 17:10

## 2024-02-08 RX ADMIN — LEVOTHYROXINE SODIUM 50 MCG: 50 TABLET ORAL at 05:36

## 2024-02-08 RX ADMIN — FUROSEMIDE 40 MG: 10 INJECTION, SOLUTION INTRAMUSCULAR; INTRAVENOUS at 08:56

## 2024-02-08 RX ADMIN — CLOPIDOGREL 75 MG: 75 TABLET ORAL at 08:54

## 2024-02-08 RX ADMIN — Medication 3 L/MIN: at 09:02

## 2024-02-08 RX ADMIN — IPRATROPIUM BROMIDE AND ALBUTEROL SULFATE 3 ML: 2.5; .5 SOLUTION RESPIRATORY (INHALATION) at 09:02

## 2024-02-08 ASSESSMENT — COGNITIVE AND FUNCTIONAL STATUS - GENERAL
EATING MEALS: A LOT
EATING MEALS: A LOT
CLIMB 3 TO 5 STEPS WITH RAILING: TOTAL
HELP NEEDED FOR BATHING: A LOT
HELP NEEDED FOR BATHING: A LOT
PERSONAL GROOMING: A LITTLE
WALKING IN HOSPITAL ROOM: A LITTLE
DRESSING REGULAR UPPER BODY CLOTHING: A LITTLE
DRESSING REGULAR LOWER BODY CLOTHING: TOTAL
DAILY ACTIVITIY SCORE: 14
TURNING FROM BACK TO SIDE WHILE IN FLAT BAD: A LOT
DRESSING REGULAR LOWER BODY CLOTHING: A LOT
MOVING TO AND FROM BED TO CHAIR: A LITTLE
STANDING UP FROM CHAIR USING ARMS: A LITTLE
STANDING UP FROM CHAIR USING ARMS: A LITTLE
CLIMB 3 TO 5 STEPS WITH RAILING: TOTAL
TOILETING: A LOT
DRESSING REGULAR UPPER BODY CLOTHING: A LITTLE
TURNING FROM BACK TO SIDE WHILE IN FLAT BAD: A LOT
PERSONAL GROOMING: A LITTLE
MOVING TO AND FROM BED TO CHAIR: A LITTLE
MOBILITY SCORE: 15
EATING MEALS: A LOT
DRESSING REGULAR UPPER BODY CLOTHING: A LITTLE
DRESSING REGULAR LOWER BODY CLOTHING: A LOT
DAILY ACTIVITIY SCORE: 14
DAILY ACTIVITIY SCORE: 13
HELP NEEDED FOR BATHING: A LOT
MOVING FROM LYING ON BACK TO SITTING ON SIDE OF FLAT BED WITH BEDRAILS: A LITTLE
MOBILITY SCORE: 15
WALKING IN HOSPITAL ROOM: A LITTLE
PERSONAL GROOMING: A LITTLE
TOILETING: A LOT
MOVING FROM LYING ON BACK TO SITTING ON SIDE OF FLAT BED WITH BEDRAILS: A LITTLE
TOILETING: A LOT

## 2024-02-08 ASSESSMENT — PAIN SCALES - GENERAL
PAINLEVEL_OUTOF10: 0 - NO PAIN

## 2024-02-08 ASSESSMENT — PAIN - FUNCTIONAL ASSESSMENT
PAIN_FUNCTIONAL_ASSESSMENT: 0-10
PAIN_FUNCTIONAL_ASSESSMENT: 0-10

## 2024-02-08 ASSESSMENT — ACTIVITIES OF DAILY LIVING (ADL): HOME_MANAGEMENT_TIME_ENTRY: 18

## 2024-02-08 NOTE — PROGRESS NOTES
"Daphne Morris is a 68 y.o. female on day 6 of admission presenting with CHF (congestive heart failure), NYHA class I, acute on chronic, combined (CMS/HCC).      Subjective   She is sitting up in the bed. Still very edematous, slight shortness of breath.       Review of systems:  Constitutional: negative for fever, chills, or malaise  Neuro: negative for dizziness, headache, numbness, tingling  ENT: Negative for nasal congestion or sore throat  CV: negative for chest pain, palpitations  GI: negative for abd pain, nausea, vomiting or diarrhea  : negative for dysuria, frequency, or urgency  Musculoskeletal: Negative for weakness, myalgia, or arthralgia  Endocrine: Negative for polyuria or polydipsia         Objective   Constitutional: Well developed, awake/alert/oriented x3, no distress, alert and cooperative  Eyes: PERRL, EOMI, clear sclera  ENMT: mucous membranes moist, no apparent injury, no lesions seen  Head/Neck: Neck supple, no apparent injury, thyroid without mass or tenderness, No JVD, trachea midline, no bruits  Respiratory/Thorax: Patent airways, CTAB, diminished and crackles in bases, breath sounds with good chest expansion, thorax symmetric  Cardiovascular: Regular, rate and rhythm, no murmurs, 2+ equal pulses of the extremities, normal S 1and S 2  Gastrointestinal: Nondistended, soft, non-tender, no rebound tenderness or guarding, no masses palpable, no organomegaly, +BS, no bruits  Musculoskeletal: ROM intact, no joint swelling, normal strength  Extremities:  2+ pitting edema BUE and lower extremities  Neurological: alert and oriented x3, intact senses, motor, response and reflexes, normal strength  Lymphatic: No significant lymphadenopathy  Psychological: Appropriate mood and behavior  Skin: Warm and dry, no lesions, no rashes      Last Recorded Vitals  /58 (BP Location: Left arm, Patient Position: Lying)   Pulse 55   Temp 36.7 °C (98.1 °F) (Temporal)   Resp 15   Ht 1.676 m (5' 6\")   Wt " 102 kg (225 lb 12 oz)   SpO2 98%   BMI 36.44 kg/m²     Intake/Output last 3 Shifts:  I/O last 3 completed shifts:  In: 600 (5.9 mL/kg) [P.O.:600]  Out: 1401 (13.7 mL/kg) [Urine:1400 (0.4 mL/kg/hr); Blood:1]  Weight: 102.4 kg   I/O this shift:  In: 240 [P.O.:240]  Out: 1025 [Urine:1025]    Relevant Results  Scheduled medications  amiodarone, 200 mg, oral, Daily with breakfast  amLODIPine, 5 mg, oral, Daily  aspirin, 81 mg, oral, Daily  atorvastatin, 40 mg, oral, Daily  clopidogrel, 75 mg, oral, Daily  ferrous gluconate, 324 mg, oral, Daily with breakfast  furosemide, 80 mg, intravenous, q12h  heparin (porcine), 5,000 Units, subcutaneous, q8h  insulin glargine, 16 Units, subcutaneous, Nightly  insulin lispro, 0-15 Units, subcutaneous, TID with meals  ipratropium-albuteroL, 3 mL, nebulization, TID  levothyroxine, 200 mcg, oral, Daily before breakfast  levothyroxine, 50 mcg, oral, Daily before breakfast  metoprolol succinate XL, 50 mg, oral, BID  pantoprazole, 40 mg, oral, BID  [Held by provider] potassium chloride CR, 20 mEq, oral, Daily  [Held by provider] SITagliptin phosphate, 25 mg, oral, Daily  venlafaxine XR, 75 mg, oral, Daily      Continuous medications     PRN medications  PRN medications: acetaminophen, dextrose 10 % in water (D10W), dextrose, glucagon, HYDROmorphone, ipratropium-albuteroL, melatonin, oxygen, oxygen, oxygen    Results for orders placed or performed during the hospital encounter of 02/02/24 (from the past 24 hour(s))   POCT GLUCOSE   Result Value Ref Range    POCT Glucose 187 (H) 74 - 99 mg/dL   POCT GLUCOSE   Result Value Ref Range    POCT Glucose 230 (H) 74 - 99 mg/dL   POCT GLUCOSE   Result Value Ref Range    POCT Glucose 120 (H) 74 - 99 mg/dL   Basic Metabolic Panel   Result Value Ref Range    Glucose 120 (H) 74 - 99 mg/dL    Sodium 140 136 - 145 mmol/L    Potassium 4.1 3.5 - 5.3 mmol/L    Chloride 102 98 - 107 mmol/L    Bicarbonate 31 21 - 32 mmol/L    Anion Gap 11 10 - 20 mmol/L     Urea Nitrogen 74 (H) 6 - 23 mg/dL    Creatinine 3.58 (H) 0.50 - 1.05 mg/dL    eGFR 13 (L) >60 mL/min/1.73m*2    Calcium 7.4 (L) 8.6 - 10.3 mg/dL   POCT GLUCOSE   Result Value Ref Range    POCT Glucose 124 (H) 74 - 99 mg/dL       Cardiac catheterization - non-coronary   Final Result      Transthoracic Echo (TTE) Complete   Final Result      XR chest 1 view   Final Result   Marked interval improvement in the right pleural effusion with   interval thoracentesis. No pneumothorax.        Pulmonary vascular congestion        Minimal atelectasis right lung base        MACRO:   None.        Signed by: Rayna Carson 2/2/2024 3:23 PM   Dictation workstation:   [id number]      XR knee left 4+ views   Final Result   1. Small joint effusion and degenerative change of the knee without   osseous injury evident. Knowledge of any trauma may be helpful. If   there is persistent concern for osseous injury, cross-sectional   imaging can be considered.   2. Reticular increased density in the subcutaneous tissues which may   represent lymphedema and/or cellulitis.        MACRO:   None        Signed by: Adama Jonas 2/2/2024 4:24 PM   Dictation workstation:   [id number]      US renal complete   Final Result   No left hydronephrosis. Right total nephrectomy.        MACRO:   None        Signed by: Amando Starks 2/2/2024 2:36 PM   Dictation workstation:   [id number]          Transthoracic Echo (TTE) Complete    Result Date: 2/5/2024   [facility], [address] [place] [address]               Tel [phone] and Fax [phone] TRANSTHORACIC ECHOCARDIOGRAM REPORT  Patient Name:      MOE Lizarraga Physician:    [id number] Nicholas Antonio MD Study Date:        2/5/2024             Ordering Provider:    [id number] DOMONIQUE PURCELL MRN/PID:           [id number]             Fellow:  Accession#:        [id number]         Nurse: Date of Birth/Age: 1955 / 68 years Sonographer:          Nina Angel                                                               RD Gender:            F                    Additional Staff: Height:            167.64 cm            Admit Date:           2/2/2024 Weight:            102.51 kg            Admission Status:     Inpatient -                                                               Routine BSA:               2.11 m2              Encounter#:           [id number]                                         Department Location:  [facility] Non                                                               Invasive Blood Pressure: 147 /65 mmHg Study Type:    TRANSTHORACIC ECHO (TTE) COMPLETE Diagnosis/ICD: Acute combined systolic (congestive) and diastolic (congestive)                heart failure (CHF)-I50.41 Indication:    Congestive Heart Failure CPT Code:      Echo Complete w Full [id number] Patient History: Diabetes:         Yes Pertinent         A-Fib, HTN, Dyspnea and LE Edema. Watchman device, elevated History:          BNP,. Study Detail: The following Echo studies were performed: 2D, M-Mode, Doppler and               color flow.  PHYSICIAN INTERPRETATION: Left Ventricle: The left ventricular systolic function is normal, with an estimated ejection fraction of 60-65%. There are no regional wall motion abnormalities. The left ventricular cavity size is normal. Spectral Doppler shows an impaired relaxation pattern of left ventricular diastolic filling. Left Atrium: The left atrium is moderately dilated. Right Ventricle: The right ventricle is mildly enlarged. There is mildly reduced right ventricular systolic function. Right Atrium: The right atrium is moderately dilated. Aortic Valve: The aortic valve is trileaflet. There is mild aortic valve cusp calcification. There is trivial aortic valve regurgitation. The peak instantaneous gradient of  the aortic valve is 14.1 mmHg. The mean gradient of the aortic valve is 9.1 mmHg. Mitral Valve: The mitral valve is normal in structure. There is mild mitral annular calcification. There is mild to moderate mitral valve regurgitation. Tricuspid Valve: The tricuspid valve is structurally normal. There is moderate tricuspid regurgitation. Pulmonic Valve: The pulmonic valve is structurally normal. There is mild pulmonic valve regurgitation. Pericardium: There is a small to moderate pericardial effusion posterior to the left ventricle. There is no evidence of cardiac tamponade. Aorta: The aortic root is normal. Pulmonary Artery: The tricuspid regurgitant velocity is 3.04 m/s, and with an estimated right atrial pressure of 3 mmHg, the estimated pulmonary artery pressure is mildly elevated with the RVSP at 39.9 mmHg. Pulmonary Veins: The pulmonary veins appear normal and return normally to the left atrium. Systemic Veins: The inferior vena cava appears to be of normal size. There is IVC inspiratory collapse greater than 50%.  CONCLUSIONS:  1. Left ventricular systolic function is normal with a 60-65% estimated ejection fraction.  2. Spectral Doppler shows an impaired relaxation pattern of left ventricular diastolic filling.  3. There is mildly reduced right ventricular systolic function.  4. The left atrium is moderately dilated.  5. The right atrium is moderately dilated.  6. There is a small to moderate pericardial effusion.  7. There is no evidence of cardiac tamponade.  8. Mild to moderate mitral valve regurgitation.  9. Moderate tricuspid regurgitation visualized. 10. Normal CVP. Estimated PASP around 45 mm Hg. QUANTITATIVE DATA SUMMARY: 2D MEASUREMENTS:                           Normal Ranges: IVSd:          1.52 cm    (0.6-1.1cm) LVPWd:         1.42 cm    (0.6-1.1cm) LVIDd:         4.35 cm    (3.9-5.9cm) LVIDs:         2.94 cm LV Mass Index: 120.7 g/m2 LV % FS        32.3 % LA VOLUME:                               Normal Ranges: LA Vol A4C:       79.5 ml    (22+/-6mL/m2) LA Vol A2C:       78.2 ml LA Vol BP:        79.7 ml LA Vol Index A4C: 37.7ml/m2 LA Vol Index A2C: 37.1 ml/m2 LA Vol Index BP:  37.9 ml/m2 LA Volume Index:  37.8 ml/m2 LA Vol A4C:       76.0 ml LA Vol A2C:       73.9 ml RA VOLUME BY A/L METHOD:                       Normal Ranges: RA Area A4C: 17.8 cm2 M-MODE MEASUREMENTS:                  Normal Ranges: Ao Root: 3.00 cm (2.0-3.7cm) LAs:     5.08 cm (2.7-4.0cm) LV SYSTOLIC FUNCTION BY 2D PLANIMETRY (MOD):                     Normal Ranges: EF-A4C View: 63.4 % (>=55%) EF-A2C View: 63.8 % EF-Biplane:  62.5 % LV DIASTOLIC FUNCTION:                             Normal Ranges: MV Peak E:      1.33 m/s    (0.7-1.2 m/s) MV Peak A:      0.53 m/s    (0.42-0.7 m/s) E/A Ratio:      2.52        (1.0-2.2) MV e'           0.06 m/s    (>8.0) MV lateral e'   0.06 m/s MV medial e'    0.06 m/s MV A Dur:       110.73 msec E/e' Ratio:     22.09       (<8.0) PulmV Sys Mike:  53.97 cm/s PulmV Dugan Mike: 77.63 cm/s PulmV S/D Mike:  0.70 MITRAL VALVE:                 Normal Ranges: MV DT: 245 msec (150-240msec) MITRAL INSUFFICIENCY:                         Normal Ranges: MR VTI:     171.38 cm MR Vmax:    489.73 cm/s MR Volume:  20.07 ml MR Flow Rt: 57.36 ml/s MR EROA:    0.12 cm2 AORTIC VALVE:                                    Normal Ranges: AoV Vmax:                1.88 m/s  (<=1.7m/s) AoV Peak P.1 mmHg (<20mmHg) AoV Mean P.1 mmHg  (1.7-11.5mmHg) LVOT Max Mike:            0.97 m/s  (<=1.1m/s) AoV VTI:                 46.22 cm  (18-25cm) LVOT VTI:                24.89 cm LVOT Diameter:           1.96 cm   (1.8-2.4cm) AoV Area, VTI:           1.62 cm2  (2.5-5.5cm2) AoV Area,Vmax:           1.56 cm2  (2.5-4.5cm2) AoV Dimensionless Index: 0.54  RIGHT VENTRICLE: RV Basal 3.80 cm RV Mid   2.80 cm RV Major 8.0 cm TAPSE:   18.0 mm RV s'    0.13 m/s TRICUSPID VALVE/RVSP:                             Normal Ranges:  Peak TR Velocity: 3.04 m/s RV Syst Pressure: 39.9 mmHg (< 30mmHg) PULMONIC VALVE:                      Normal Ranges: PV Max Mike: 1.0 m/s  (0.6-0.9m/s) PV Max PG:  3.9 mmHg Pulmonary Veins: PulmV Dugan Mike: 77.63 cm/s PulmV S/D Mike:  0.70 PulmV Sys Mike:  53.97 cm/s  [id number] Nicholas Antonio MD Electronically signed on 2/5/2024 at 5:06:05 PM  ** Final **           Assessment/Plan   Principal Problem:    CHF (congestive heart failure), NYHA class I, acute on chronic, combined (CMS/[facility])    Echocardiogram from August 2023 (post watchman) reviewed: LVEF 55%, small to moderate pericardial effusions         1. Acute on chronic hypoxic/hypercapnic respiratory failure 2/2 acute on chronic diastolic CHF and pneumonia, COPD exac, Pleural effusion  -CXR showed large right pleural effusion  -CTA of the chest showed large right pleural effusion with complete collapse of the right lower lung, pneumonia, a small pericardial effusion, and soft tissue edema at the chest and abd wall.   -antibiotics  -bronchodilators  -Steroids  -oxygen support/Bipap  -sputum culture  -blood cultures  -Still with ignificant leg to abd swelling and shortness of breath  -BNP 1786  -Strict I & Os  -Daily weights  -2gm na diet  -Repeat echo as above  -Unable to start SGLT2 inhibitors with current GFR  -Pulmonary following, s/p thoracentesis 2.3L  -s/p RHC today showed MEAN PA 30 MILD PULMONARY HTN MEAN RA 9 MM HG MEAN PCWP 23 MM HG CARDIAC OUTPUT 8.03 CI 3.79 NO SHUNTS   -Per nephro notes->increase IV lasix to 80mg BID  -Monitor on tele     2. Elevated troponins-non MI elevation  -Denies chest pain  -Does have shortness of breath-worsening for weeks  -Had a LHC planned 2 weeks ago->unable to lay flat  -Cont asa, plavix, statin, metoprolol  -Unable to do LHC due to Creatinine 3.6  -Monitor on tele     3. Paroxysmal atrial fibrillation with RVR  -s/p Watchman in Aug 2023  -Currently SB  -Cont amiodarone and metoprolol for rate control  -Monitor on tele      4. Hypertension  -stable  -2gm na diet  -cont meds  -monitor     5. Anemia  -Appear stable     6. Acute on Chronic kidney disease  -s/p nephrectomy  -Renal consulted, note reviewed increase lasix to 80mg BID  -Renal US negative  -Monitor closely on Lasix IV    7. Diabetes  -ISS  -Accuchecks     8. Hypothyroidism  -Cont synthroid     9. Pericardial effusion  -Mild to moderate on echo  -Cont diuresis      CARLENE Villegas-CNP

## 2024-02-08 NOTE — PROGRESS NOTES
Daphne Morris is a 68 y.o. female on day 6 of admission presenting with CHF (congestive heart failure), NYHA class I, acute on chronic, combined (CMS/HCC).      Subjective   Says she feels good, wants to go home       Objective     Last Recorded Vitals  /58 (BP Location: Left arm, Patient Position: Lying)   Pulse 55   Temp 36.7 °C (98.1 °F) (Temporal)   Resp 15   Wt 102 kg (225 lb 12 oz)   SpO2 98%   Intake/Output last 3 Shifts:    Intake/Output Summary (Last 24 hours) at 2/8/2024 1254  Last data filed at 2/8/2024 1100  Gross per 24 hour   Intake 720 ml   Output 2126 ml   Net -1406 ml       Admission Weight  Weight: 99.8 kg (220 lb 0.3 oz) (02/02/24 0404)    Daily Weight  02/08/24 : 102 kg (225 lb 12 oz)    Image Results  Cardiac catheterization - non-coronary     [facility], Cath Lab, [address] [place] [address]    Cardiovascular Catheterization Report    Patient Name:      DAPHNE MORRIS      Performing Physician:  [id number] Nicholas Antonio MD  Study Date:        2/7/2024             Verifying Physician:   [id number] Antonio MD  MRN/PID:           [id number]             Cardiologist/Co-scrub:  Accession#:        [id number]         Ordering Physician:    [id number] DOMONIQUE PURCELL  Date of Birth/Age: 1955 / 68 years Fellow:  Gender:            F                    Fellow:  Encounter#:        [id number]       Study: Right Heart Cath       Indications:  DAPHNE MORRIS is a 69 year old female who presents with dyslipidemia, hypertension, renal failure, atrial fibrillation, diabetes, peripheral artery disease, chronic pulmonary disease, obesity and Tobacco Use - Current. Heart failure. Pulmonary hypertension and heart failure.     Procedure Description:  After infiltration of  local anesthetic, the right internal jugular vein was identified with two-dimensional ultrasound. Under direct ultrasound visualization, the right internal jugular vein was cannulated with a micropuncture technique. A 7 Bengali sheath was placed in the vein. A balloon tipped catheter was advanced through the right heart to record pressures. Cardiac output was calculated via the Steph method.     Right Heart Catheterization:  A balloon tipped catheter was advanced through the right heart to record pressures. Cardiac output was calculated via the Steph method. Elevated left sided filling pressures with normal cardiac output. Cardiac output is normal. Preserved cardiac output at rest. No evidence of shunt. Elevated pulmonary vascular resistance. Elevated systemic vascular resistance. Pulmonary venous hypertension and pulmonary arterial hypertension.     Hemo Personnel:  +-------------------------+---------+  Name                     Duty       +-------------------------+---------+  Nicholas Antonio MDPROC MD 1  +-------------------------+---------+       +----------+  Contrast:   +----------+  Omnipaque:  +----------+       Hemodynamic Pressures:     +----+---------+----------+------------+-------------+---+-----+-------+-------+  SiteDate TimePhase Name  Systolic    Diastolic  ED Mean A-Wave V-Wave                              mmHg        mmHg     mmHmmHg  mmHg   mmHg                                                     g                      +----+---------+----------+------------+-------------+---+-----+-------+-------+    SARIAH 2/7/2024  AIR REST                                9     13     11        2:16:48                                                                      PM                                                           +----+---------+----------+------------+-------------+---+-----+-------+-------+    RV 2/7/2024  AIR REST           56            3 14                           2:17:18                                                                      PM                                                           +----+---------+----------+------------+-------------+---+-----+-------+-------+    PA 2/7/2024  AIR REST          55           15      30                      2:21:53                                                                      PM                                                           +----+---------+----------+------------+-------------+---+-----+-------+-------+    PW 2/7/2024  AIR REST                               23     20     35        2:23:01                                                                      PM                                                           +----+---------+----------+------------+-------------+---+-----+-------+-------+    PW 2/7/2024  AIR REST                               23     20     38        2:23:28                                                                      PM                                                           +----+---------+----------+------------+-------------+---+-----+-------+-------+    PA 2/7/2024  AIR REST          57           15      33                      2:24:12                                                                      PM                                                           +----+---------+----------+------------+-------------+---+-----+-------+-------+    AO 2/7/2024  AIR REST         176           62      -2                      2:28:01                                                                      PM                                                           +----+---------+----------+------------+-------------+---+-----+-------+-------+         Oxygen  Saturation %:  +-----------+----------+------------+  Sample SiteO2 Sat (%)HB (g/100ml)  +-----------+----------+------------+           FA        94         8.6  +-----------+----------+------------+           RA        64         8.6  +-----------+----------+------------+           FA        94         8.6  +-----------+----------+------------+           PA        60         8.6  +-----------+----------+------------+       Cardiac Outputs:  +---------------+------------------+-------+  ISABELLE CO (l/min)ISABELLE CI (l/min/m2)ISABELLE SV  +---------------+------------------+-------+              8.0               3.8  138.5  +---------------+------------------+-------+       Vascular Resistance Calculated Values (Wood Units):  +-----+---+----+---+----+  PhasePVRPVRITPRTPRI  +-----+---+----+---+----+  0    1.02.1 4.29.0   +-----+---+----+---+----+       Complications:  No in-lab complications observed.     Cardiac Cath Post Procedure Notes:  Post Procedure           Moderately elevated PCWP, normal cardiac output, no  Diagnosis:               shunts.  Blood Loss:              Estimated blood loss during the procedure was 0 mls.  Specimens Removed:       Number of specimen(s) removed: none.       Recommendations:  Maximize medical therapy.  Agressive risk factor modification efforts.  Optimize volume status with diuretics.  Renal replacement therapy.  Patient counseled and advised to discontinue smoking.    ____________________________________________________________________________________  CONCLUSIONS:   1. RHC shows moderate pulmonary HTN, mPAP 30-33 mm Hg, mRA 9 mm Hg, normal cardiac output and no shunts.   2. Moderately elevated PCWP.    ICD 10 Codes:  Acute diastolic (congestive) heart failure (CHF)-I50.31     CPT Codes:  Right Heart Cath O2/Cardiac output without biopsy (RHC)-66464; Moderate Sedation Services 1st additional 15 minutes patient >5 years-34775; Luis Crook-[id number]    "  [id number] Nicholas Antonio MD  Performing Physician  Electronically signed by [id number] Nicholas Antonio MD on 2/7/2024 at 5:02:34  PM         ** Final **    No current facility-administered medications on file prior to encounter.     Current Outpatient Medications on File Prior to Encounter   Medication Sig Dispense Refill    albuterol 90 mcg/actuation inhaler Inhale 2 puffs every 4 hours if needed.      amiodarone (Pacerone) 200 mg tablet Take 1 tablet (200 mg) by mouth once daily with a meal.      amLODIPine (Norvasc) 5 mg tablet Take 1 tablet (5 mg) by mouth once daily.      aspirin 81 mg EC tablet Take 1 tablet (81 mg) by mouth once daily.      atorvastatin (Lipitor) 40 mg tablet Take 1 tablet (40 mg) by mouth once daily.      BD Calista 2nd Gen Pen Needle 32 gauge x 5/32\" needle USE SUBCUTANEOUSLY AS DIRECTED TWICE DAILY      clopidogrel (Plavix) 75 mg tablet Take 1 tablet (75 mg) by mouth once daily.      ferrous gluconate 324 (38 Fe) MG tablet Take 1 tablet (324 mg) by mouth once daily with breakfast. 30 tablet 3    FreeStyle Shakir 14 Day Sensor kit USE AS DIRECTED TO CHECK SUGARS 3 TO 4 TIMES DAILY      FreeStyle Lite Strips strip twice a day.      HumaLOG KwikPen Insulin 100 unit/mL injection up to 15 units Subcutaneous three times a day per sliding scale      ipratropium-albuteroL (Duo-Neb) 0.5-2.5 mg/3 mL nebulizer solution Take 3 mL by nebulization every 6 hours if needed.      Lantus Solostar U-100 Insulin 100 unit/mL (3 mL) pen Inject 14 Units under the skin once daily at bedtime.      levothyroxine (Synthroid, Levoxyl) 200 mcg tablet Take 1 tablet (200 mcg) by mouth once daily in the morning. Take before meals. 90 tablet 3    levothyroxine (Synthroid, Levoxyl) 50 mcg tablet Take 1 tablet (50 mcg) by mouth once daily in the morning. Take before meals.      metoprolol succinate XL (Toprol-XL) 50 mg 24 hr tablet TAKE ONE TABLET BY MOUTH TWO TIMES A  tablet 1    oxygen (O2) gas therapy 2 l/min " nasal cannula      pantoprazole (ProtoNix) 40 mg EC tablet Take 1 tablet (40 mg) by mouth 2 times a day. 60 tablet 6    potassium chloride CR 20 mEq ER tablet TAKE ONE TABLET BY MOUTH EVERY DAY WITH FOOD 90 tablet 1    SITagliptin phosphate (Januvia) 100 mg tablet Take 1 tablet (100 mg) by mouth once daily.      Soaanz 60 mg tablet Take 1 tablet by mouth once daily. 30 tablet 3    venlafaxine XR (Effexor-XR) 75 mg 24 hr capsule Take 1 capsule (75 mg) by mouth once daily.        Results for orders placed or performed during the hospital encounter of 02/02/24 (from the past 24 hour(s))   POCT GLUCOSE   Result Value Ref Range    POCT Glucose 187 (H) 74 - 99 mg/dL   POCT GLUCOSE   Result Value Ref Range    POCT Glucose 230 (H) 74 - 99 mg/dL   POCT GLUCOSE   Result Value Ref Range    POCT Glucose 120 (H) 74 - 99 mg/dL   Basic Metabolic Panel   Result Value Ref Range    Glucose 120 (H) 74 - 99 mg/dL    Sodium 140 136 - 145 mmol/L    Potassium 4.1 3.5 - 5.3 mmol/L    Chloride 102 98 - 107 mmol/L    Bicarbonate 31 21 - 32 mmol/L    Anion Gap 11 10 - 20 mmol/L    Urea Nitrogen 74 (H) 6 - 23 mg/dL    Creatinine 3.58 (H) 0.50 - 1.05 mg/dL    eGFR 13 (L) >60 mL/min/1.73m*2    Calcium 7.4 (L) 8.6 - 10.3 mg/dL   POCT GLUCOSE   Result Value Ref Range    POCT Glucose 124 (H) 74 - 99 mg/dL        Physical Exam  HENT:      Head: Normocephalic.      Nose: Nose normal.      Mouth/Throat:      Mouth: Mucous membranes are moist.   Eyes:      Extraocular Movements: Extraocular movements intact.      Pupils: Pupils are equal, round, and reactive to light.   Neck:      Comments: Jvd is present  Cardiovascular:      Rate and Rhythm: Normal rate and regular rhythm.   Pulmonary:      Comments: Bilateral crackles, left greater than right. Easy respirations, no wheezing  Abdominal:      General: Bowel sounds are normal.      Palpations: Abdomen is soft.   Musculoskeletal:      Comments: Kyphosis is present.   Legs wrapped, edema above dressing    Skin:     Comments: Pale, fair turgor   Neurological:      Mental Status: She is alert.      Cranial Nerves: Cranial nerves 2-12 are intact.      Coordination: Coordination is intact.   Psychiatric:         Attention and Perception: Attention normal.         Mood and Affect: Mood normal.         Speech: Speech normal.       Relevant Results         Assessment/Plan        This patient has a urinary catheter   Reason for the urinary catheter remaining today? urinary retention/bladder outlet obstruction, acute or chronic    Principal Problem:    CHF (congestive heart failure), NYHA class I, acute on chronic, combined (CMS/[facility])    Decompensated heart failure, combined. If weights are to be believed, she has actually gained weight since she was admitted. Cath report reviewed. Awaiting nephro input. It is likely she may need to start RRT at this time.   DM. Sugars are fairly stable, continue to monitor, iss as needed  CKD, stage 5  Hypothyroid, stable on supplement  PAF status post Watchman procedure  Pericardial effusion, should improve after diuresis  COPD, no exacerbation at this time  Hypertension, stable              Alejandra Marshall MD

## 2024-02-08 NOTE — PROGRESS NOTES
We are following for ANDREA/CKD in setting of acute decompensated heart failure    No new issues.  She had a heart catheterization yesterday for PA pressures.  Wedges noted to be elevated with elevated PA pressures.  Needs more aggressive diuresis.  Currently on Lasix 40 mg twice daily.  Fattening is stable.  Potassium is normal.  BP is somewhat labile    Increase Lasix to 80 mg twice daily.  Reviewed risks and benefits of this with patient.  This treatment plan will require close monitoring to address untoward effects such as renal failure and electrolyte disturbances

## 2024-02-09 ENCOUNTER — APPOINTMENT (OUTPATIENT)
Dept: RADIOLOGY | Facility: HOSPITAL | Age: 69
DRG: 286 | End: 2024-02-09
Payer: MEDICARE

## 2024-02-09 DIAGNOSIS — I50.42 CHRONIC COMBINED SYSTOLIC AND DIASTOLIC HEART FAILURE (MULTI): ICD-10-CM

## 2024-02-09 LAB
APPEARANCE UR: CLEAR
BACTERIA #/AREA URNS AUTO: ABNORMAL /HPF
BILIRUB UR STRIP.AUTO-MCNC: NEGATIVE MG/DL
COLOR UR: YELLOW
GLUCOSE BLD MANUAL STRIP-MCNC: 156 MG/DL (ref 74–99)
GLUCOSE BLD MANUAL STRIP-MCNC: 191 MG/DL (ref 74–99)
GLUCOSE BLD MANUAL STRIP-MCNC: 246 MG/DL (ref 74–99)
GLUCOSE BLD MANUAL STRIP-MCNC: 280 MG/DL (ref 74–99)
GLUCOSE UR STRIP.AUTO-MCNC: ABNORMAL MG/DL
HYALINE CASTS #/AREA URNS AUTO: ABNORMAL /LPF
KETONES UR STRIP.AUTO-MCNC: NEGATIVE MG/DL
LEUKOCYTE ESTERASE UR QL STRIP.AUTO: ABNORMAL
MUCOUS THREADS #/AREA URNS AUTO: ABNORMAL /LPF
NITRITE UR QL STRIP.AUTO: NEGATIVE
PH UR STRIP.AUTO: 5 [PH]
PROT UR STRIP.AUTO-MCNC: ABNORMAL MG/DL
RBC # UR STRIP.AUTO: ABNORMAL /UL
RBC #/AREA URNS AUTO: ABNORMAL /HPF
SP GR UR STRIP.AUTO: 1.01
SQUAMOUS #/AREA URNS AUTO: ABNORMAL /HPF
UROBILINOGEN UR STRIP.AUTO-MCNC: <2 MG/DL
WBC #/AREA URNS AUTO: ABNORMAL /HPF
WBC CLUMPS #/AREA URNS AUTO: ABNORMAL /HPF
YEAST BUDDING #/AREA UR COMP ASSIST: PRESENT /HPF

## 2024-02-09 PROCEDURE — 2500000001 HC RX 250 WO HCPCS SELF ADMINISTERED DRUGS (ALT 637 FOR MEDICARE OP)

## 2024-02-09 PROCEDURE — 94640 AIRWAY INHALATION TREATMENT: CPT | Mod: MUE

## 2024-02-09 PROCEDURE — 2060000001 HC INTERMEDIATE ICU ROOM DAILY

## 2024-02-09 PROCEDURE — 2500000002 HC RX 250 W HCPCS SELF ADMINISTERED DRUGS (ALT 637 FOR MEDICARE OP, ALT 636 FOR OP/ED): Performed by: INTERNAL MEDICINE

## 2024-02-09 PROCEDURE — 97530 THERAPEUTIC ACTIVITIES: CPT | Mod: GP,CQ

## 2024-02-09 PROCEDURE — 99232 SBSQ HOSP IP/OBS MODERATE 35: CPT | Performed by: INTERNAL MEDICINE

## 2024-02-09 PROCEDURE — 71045 X-RAY EXAM CHEST 1 VIEW: CPT | Performed by: RADIOLOGY

## 2024-02-09 PROCEDURE — 82947 ASSAY GLUCOSE BLOOD QUANT: CPT

## 2024-02-09 PROCEDURE — 97530 THERAPEUTIC ACTIVITIES: CPT | Mod: GO

## 2024-02-09 PROCEDURE — 2500000002 HC RX 250 W HCPCS SELF ADMINISTERED DRUGS (ALT 637 FOR MEDICARE OP, ALT 636 FOR OP/ED)

## 2024-02-09 PROCEDURE — 2500000004 HC RX 250 GENERAL PHARMACY W/ HCPCS (ALT 636 FOR OP/ED): Performed by: INTERNAL MEDICINE

## 2024-02-09 PROCEDURE — 81003 URINALYSIS AUTO W/O SCOPE: CPT | Performed by: INTERNAL MEDICINE

## 2024-02-09 PROCEDURE — 2500000004 HC RX 250 GENERAL PHARMACY W/ HCPCS (ALT 636 FOR OP/ED)

## 2024-02-09 PROCEDURE — 71045 X-RAY EXAM CHEST 1 VIEW: CPT

## 2024-02-09 RX ADMIN — INSULIN LISPRO 3 UNITS: 100 INJECTION, SOLUTION INTRAVENOUS; SUBCUTANEOUS at 12:16

## 2024-02-09 RX ADMIN — LEVOTHYROXINE SODIUM 50 MCG: 50 TABLET ORAL at 05:23

## 2024-02-09 RX ADMIN — IPRATROPIUM BROMIDE AND ALBUTEROL SULFATE 3 ML: 2.5; .5 SOLUTION RESPIRATORY (INHALATION) at 07:15

## 2024-02-09 RX ADMIN — METOPROLOL SUCCINATE 50 MG: 50 TABLET, EXTENDED RELEASE ORAL at 08:17

## 2024-02-09 RX ADMIN — IPRATROPIUM BROMIDE AND ALBUTEROL SULFATE 3 ML: 2.5; .5 SOLUTION RESPIRATORY (INHALATION) at 19:53

## 2024-02-09 RX ADMIN — HEPARIN SODIUM 5000 UNITS: 5000 INJECTION INTRAVENOUS; SUBCUTANEOUS at 05:23

## 2024-02-09 RX ADMIN — PANTOPRAZOLE SODIUM 40 MG: 40 TABLET, DELAYED RELEASE ORAL at 20:56

## 2024-02-09 RX ADMIN — CLOPIDOGREL 75 MG: 75 TABLET ORAL at 08:17

## 2024-02-09 RX ADMIN — INSULIN GLARGINE 16 UNITS: 100 INJECTION, SOLUTION SUBCUTANEOUS at 20:57

## 2024-02-09 RX ADMIN — INSULIN LISPRO 6 UNITS: 100 INJECTION, SOLUTION INTRAVENOUS; SUBCUTANEOUS at 08:18

## 2024-02-09 RX ADMIN — METOPROLOL SUCCINATE 50 MG: 50 TABLET, EXTENDED RELEASE ORAL at 20:56

## 2024-02-09 RX ADMIN — LEVOTHYROXINE SODIUM 200 MCG: 100 TABLET ORAL at 05:22

## 2024-02-09 RX ADMIN — VENLAFAXINE HYDROCHLORIDE 75 MG: 75 CAPSULE, EXTENDED RELEASE ORAL at 08:38

## 2024-02-09 RX ADMIN — IPRATROPIUM BROMIDE AND ALBUTEROL SULFATE 3 ML: 2.5; .5 SOLUTION RESPIRATORY (INHALATION) at 14:42

## 2024-02-09 RX ADMIN — PANTOPRAZOLE SODIUM 40 MG: 40 TABLET, DELAYED RELEASE ORAL at 08:17

## 2024-02-09 RX ADMIN — FUROSEMIDE 80 MG: 10 INJECTION, SOLUTION INTRAMUSCULAR; INTRAVENOUS at 08:17

## 2024-02-09 RX ADMIN — ATORVASTATIN CALCIUM 40 MG: 40 TABLET, FILM COATED ORAL at 08:17

## 2024-02-09 RX ADMIN — HEPARIN SODIUM 5000 UNITS: 5000 INJECTION INTRAVENOUS; SUBCUTANEOUS at 12:16

## 2024-02-09 RX ADMIN — HEPARIN SODIUM 5000 UNITS: 5000 INJECTION INTRAVENOUS; SUBCUTANEOUS at 20:56

## 2024-02-09 RX ADMIN — AMIODARONE HYDROCHLORIDE 200 MG: 200 TABLET ORAL at 08:17

## 2024-02-09 RX ADMIN — FERROUS GLUCONATE 324 MG: 324 TABLET ORAL at 08:17

## 2024-02-09 RX ADMIN — ASPIRIN 81 MG: 81 TABLET, COATED ORAL at 08:17

## 2024-02-09 RX ADMIN — INSULIN LISPRO 3 UNITS: 100 INJECTION, SOLUTION INTRAVENOUS; SUBCUTANEOUS at 16:37

## 2024-02-09 RX ADMIN — FUROSEMIDE 80 MG: 10 INJECTION, SOLUTION INTRAMUSCULAR; INTRAVENOUS at 20:56

## 2024-02-09 RX ADMIN — AMLODIPINE BESYLATE 5 MG: 5 TABLET ORAL at 08:17

## 2024-02-09 ASSESSMENT — COGNITIVE AND FUNCTIONAL STATUS - GENERAL
EATING MEALS: A LOT
DRESSING REGULAR UPPER BODY CLOTHING: A LITTLE
HELP NEEDED FOR BATHING: A LOT
TURNING FROM BACK TO SIDE WHILE IN FLAT BAD: A LOT
STANDING UP FROM CHAIR USING ARMS: A LITTLE
TOILETING: A LOT
PERSONAL GROOMING: A LITTLE
MOVING FROM LYING ON BACK TO SITTING ON SIDE OF FLAT BED WITH BEDRAILS: A LITTLE
TURNING FROM BACK TO SIDE WHILE IN FLAT BAD: A LOT
EATING MEALS: A LITTLE
DAILY ACTIVITIY SCORE: 15
DRESSING REGULAR UPPER BODY CLOTHING: A LITTLE
STANDING UP FROM CHAIR USING ARMS: A LOT
CLIMB 3 TO 5 STEPS WITH RAILING: TOTAL
PERSONAL GROOMING: A LITTLE
MOVING FROM LYING ON BACK TO SITTING ON SIDE OF FLAT BED WITH BEDRAILS: A LITTLE
HELP NEEDED FOR BATHING: A LOT
EATING MEALS: A LITTLE
PERSONAL GROOMING: A LITTLE
HELP NEEDED FOR BATHING: A LOT
TOILETING: A LOT
WALKING IN HOSPITAL ROOM: A LITTLE
DRESSING REGULAR LOWER BODY CLOTHING: A LOT
DRESSING REGULAR LOWER BODY CLOTHING: A LOT
WALKING IN HOSPITAL ROOM: A LOT
TURNING FROM BACK TO SIDE WHILE IN FLAT BAD: A LOT
MOBILITY SCORE: 12
DRESSING REGULAR LOWER BODY CLOTHING: A LOT
WALKING IN HOSPITAL ROOM: A LOT
CLIMB 3 TO 5 STEPS WITH RAILING: TOTAL
DAILY ACTIVITIY SCORE: 15
DRESSING REGULAR UPPER BODY CLOTHING: A LITTLE
MOVING TO AND FROM BED TO CHAIR: A LITTLE
MOVING TO AND FROM BED TO CHAIR: A LOT
MOBILITY SCORE: 12
CLIMB 3 TO 5 STEPS WITH RAILING: TOTAL
DAILY ACTIVITIY SCORE: 14
TOILETING: A LOT
MOBILITY SCORE: 15
STANDING UP FROM CHAIR USING ARMS: A LOT
MOVING TO AND FROM BED TO CHAIR: A LOT
MOVING FROM LYING ON BACK TO SITTING ON SIDE OF FLAT BED WITH BEDRAILS: A LITTLE

## 2024-02-09 ASSESSMENT — PAIN SCALES - GENERAL
PAINLEVEL_OUTOF10: 0 - NO PAIN

## 2024-02-09 ASSESSMENT — PAIN - FUNCTIONAL ASSESSMENT
PAIN_FUNCTIONAL_ASSESSMENT: 0-10

## 2024-02-09 NOTE — PROGRESS NOTES
Physical Therapy                 Therapy Communication Note    Patient Name: Daphne Morris  MRN: [id number]  Today's Date: 2/9/2024     Discipline: Physical Therapy    Missed Visit Reason: Missed Visit Reason: Other (Comment) (Nurse and nurse students assisting patient at this time.)    Missed Time: Attempt @ 1125    Comment:

## 2024-02-09 NOTE — PROGRESS NOTES
We are following for ANDREA/CKD in setting of acute decompensated heart failure    Started on inc Lasix yesterday. UOP ~ 2.5L. No labs. She feels same    Suggest cont Lasix 80mg IV bid thru weekend and follow labs. She is at high risk for renal compensation for renal decompensation and possible need for HD so I don't think it safe to discharge. Prognosis is guarded

## 2024-02-09 NOTE — PROGRESS NOTES
Occupational Therapy    Occupational Therapy Treatment    Name: Daphne Morris  MRN: [id number]  : 1955  Date: 24  Time Calculation  Start Time: 1130  Stop Time: 1153  Time Calculation (min): 23 min    Assessment:  OT Assessment: Pt demo's improvement with functional mobility this session, continued skilled OT recommended to work towards OT goals.  Prognosis: Good  Barriers to Discharge: None  Evaluation/Treatment Tolerance: Patient limited by fatigue  Medical Staff Made Aware: Yes  End of Session Communication: Bedside nurse  End of Session Patient Position: Up in chair, Alarm off, caregiver present  Plan:  Treatment Interventions: ADL retraining, UE strengthening/ROM, Patient/family training, Compensatory technique education  OT Frequency: 3 times per week  OT Discharge Recommendations: Moderate intensity level of continued care  Equipment Recommended upon Discharge: Wheeled walker  OT Recommended Transfer Status: Assist of 1  OT - OK to Discharge: Yes (In accordance with OT POC)    Subjective   Previous Visit Info:  OT Last Visit  OT Received On: 24  General:  General  Reason for Referral: 67 yo female referred to OT for CHF, general weakness, impaired ADL, impaired mobility  Referred By: Milton Fishman DO  Past Medical History Relevant to Rehab: heart failure, atrial fibrillation, Hypertension, type 2 DM  Prior to Session Communication: Bedside nurse  Patient Position Received: Up in chair, Alarm off, not on at start of session  General Comment: Pt pleasant, agreeable to OT treatment. Pt very fearful of falling. Tele, catheter, O2 in place  Precautions:  Medical Precautions: Fall precautions    Pain Assessment:  Pain Assessment  Pain Assessment: 0-10  Pain Score: 0 - No pain     Objective   Bed Mobility/Transfers: Transfers  Transfer: Yes  Transfer 1  Transfer From 1: Chair with arms to  Transfer to 1: Stand  Technique 1: Sit to stand, Stand to sit  Transfer Device 1: Walker  Transfer Level of  Assistance 1: Maximum assistance  Trials/Comments 1: x5 trials with assist to elevate from low chair. Max encouragement given, multiple failed trials d/t pt fear of falling. Cues for safety, hand placement. Increased time.      Ambulation/Gait Training:  Ambulation/Gait Training  Ambulation/Gait Training Performed: Yes  Ambulation/Gait Training 1  Surface 1: Level tile  Device 1: Rolling walker  Gait Support Devices: Gait belt  Assistance 1: Minimum assistance  Comments/Distance (ft) 1: Amb short household distances in pt room, approx 20 steps anterior/posterior in total  Sitting Balance:     Standing Balance:  Static Standing Balance  Static Standing-Balance Support: Bilateral upper extremity supported  Static Standing-Level of Assistance: Contact guard    Outcome Measures:  [facility] Daily Activity  Putting on and taking off regular lower body clothing: A lot  Bathing (including washing, rinsing, drying): A lot  Putting on and taking off regular upper body clothing: A little  Toileting, which includes using toilet, bedpan or urinal: A lot  Taking care of personal grooming such as brushing teeth: A little  Eating Meals: A little  Daily Activity - Total Score: 15        Education Documentation  Body Mechanics, taught by Trevor Cruz OT at 2/9/2024 12:36 PM.  Learner: Patient  Readiness: Acceptance  Method: Explanation  Response: Verbalizes Understanding, Needs Reinforcement    Precautions, taught by Trevor Cruz OT at 2/9/2024 12:36 PM.  Learner: Patient  Readiness: Acceptance  Method: Explanation  Response: Verbalizes Understanding, Needs Reinforcement    ADL Training, taught by Trevor Cruz OT at 2/9/2024 12:36 PM.  Learner: Patient  Readiness: Acceptance  Method: Explanation  Response: Verbalizes Understanding, Needs Reinforcement    Education Comments  No comments found.      Goals:  Encounter Problems       Encounter Problems (Active)       OT Goals       Pt will tolerate 10min stand during  functional task completion with no more than 1 rest break in order to increase endurance for functional task completion.  (Progressing)       Start:  02/03/24    Expected End:  02/09/24            Pt will increase endurance to tolerate 15min of OOB activity with no more than 1 rest break in order to increase ability to engage in ADL completion.  (Progressing)       Start:  02/03/24    Expected End:  02/09/24            Pt will demo increased functional mobility to tolerate tasks necessary to complete ADL routine.  (Progressing)       Start:  02/03/24    Expected End:  02/09/24            Pt will demo ADL routine and meaningful daily activities using modifications as needed  (Progressing)       Start:  02/03/24    Expected End:  02/09/24            Pt will demo and/or verbalize 2-3 energy conservation techniques to incorporate into functional mobility or ADL to improve performance and increase independence.. (Progressing)       Start:  02/03/24    Expected End:  02/09/24

## 2024-02-09 NOTE — PROGRESS NOTES
Daphne Morris is a 68 y.o. female on day 7 of admission presenting with CHF (congestive heart failure), NYHA class I, acute on chronic, combined (CMS/[facility]).      Subjective     Daphne was sitting in her chair, was very sleepy. She denied having any shortness of breath, but continued to fall asleep as she was talking. She reports she is eager to go home .    Review of systems:  Constitutional: negative for fever, chills, or malaise  Neuro: negative for dizziness, headache, numbness, tingling  ENT: Negative for nasal congestion or sore throat  Resp: negative for shortness of breath, cough, or wheezing  CV: negative for chest pain, palpitations  GI: negative for abd pain, nausea, vomiting or diarrhea  : negative for dysuria, frequency, or urgency  Skin: negative for lesions, wounds, or rash  Musculoskeletal: Negative for weakness, myalgia, or arthralgia  Endocrine: Negative for polyuria or polydipsia         Objective   Constitutional: Well developed, awake/alert/oriented x3, no distress, alert and cooperative  Eyes: PERRL, EOMI, clear sclera  ENMT: mucous membranes moist, no apparent injury, no lesions seen  Head/Neck: Neck supple, no apparent injury, thyroid without mass or tenderness, No JVD, trachea midline, no bruits  Respiratory/Thorax: Patent airways, CTAB, diminished breath sounds with good chest expansion, thorax symmetric  Cardiovascular: Regular, rate and rhythm, no murmurs, 2+ equal pulses of the extremities, normal S 1and S 2  Gastrointestinal: Nondistended, soft, non-tender, no rebound tenderness or guarding, no masses palpable, no organomegaly, +BS, no bruits  Musculoskeletal: ROM intact, no joint swelling, normal strength  Extremities: normal extremities, no cyanosis edema, contusions or wounds, no clubbing, legs wrapped with ace wraps, appear clean and dry  Neurological: alert and oriented x3, intact senses, motor, response and reflexes, normal strength  Lymphatic: No significant  "lymphadenopathy  Psychological: Appropriate mood and behavior  Skin: Warm and dry, no lesions, no rashes      Last Recorded Vitals  /69 (BP Location: Left arm, Patient Position: Sitting) Comment: RN aware  Pulse 60   Temp 36.6 °C (97.9 °F) (Temporal)   Resp 18   Ht 1.676 m (5' 6\")   Wt 103 kg (226 lb 8 oz)   SpO2 (!) 87% Comment: SPO2 continuously fluctuating between 72 an 93 but stabilizing at 87%. Nurse aware. 4L O2 via NC.  BMI 36.56 kg/m²     Intake/Output last 3 Shifts:  I/O last 3 completed shifts:  In: 1280 (12.5 mL/kg) [P.O.:1280]  Out: 2350 (22.9 mL/kg) [Urine:2350 (0.6 mL/kg/hr)]  Weight: 102.7 kg   I/O this shift:  In: 360 [P.O.:360]  Out: 570 [Urine:570]    Relevant Results  Scheduled medications  amiodarone, 200 mg, oral, Daily with breakfast  amLODIPine, 5 mg, oral, Daily  aspirin, 81 mg, oral, Daily  atorvastatin, 40 mg, oral, Daily  clopidogrel, 75 mg, oral, Daily  ferrous gluconate, 324 mg, oral, Daily with breakfast  furosemide, 80 mg, intravenous, q12h  heparin (porcine), 5,000 Units, subcutaneous, q8h  insulin glargine, 16 Units, subcutaneous, Nightly  insulin lispro, 0-15 Units, subcutaneous, TID with meals  ipratropium-albuteroL, 3 mL, nebulization, TID  levothyroxine, 200 mcg, oral, Daily before breakfast  levothyroxine, 50 mcg, oral, Daily before breakfast  metoprolol succinate XL, 50 mg, oral, BID  pantoprazole, 40 mg, oral, BID  [Held by provider] potassium chloride CR, 20 mEq, oral, Daily  [Held by provider] SITagliptin phosphate, 25 mg, oral, Daily  venlafaxine XR, 75 mg, oral, Daily      Continuous medications     PRN medications  PRN medications: acetaminophen, dextrose 10 % in water (D10W), dextrose, glucagon, HYDROmorphone, ipratropium-albuteroL, melatonin, oxygen, oxygen, oxygen    Results for orders placed or performed during the hospital encounter of 02/02/24 (from the past 24 hour(s))   POCT GLUCOSE   Result Value Ref Range    POCT Glucose 163 (H) 74 - 99 mg/dL   POCT " GLUCOSE   Result Value Ref Range    POCT Glucose 266 (H) 74 - 99 mg/dL   POCT GLUCOSE   Result Value Ref Range    POCT Glucose 246 (H) 74 - 99 mg/dL   POCT GLUCOSE   Result Value Ref Range    POCT Glucose 191 (H) 74 - 99 mg/dL   Urinalysis with Reflex Microscopic   Result Value Ref Range    Color, Urine Yellow Straw, Yellow    Appearance, Urine Clear Clear    Specific Gravity, Urine 1.011 1.005 - 1.035    pH, Urine 5.0 5.0, 5.5, 6.0, 6.5, 7.0, 7.5, 8.0    Protein, Urine >=500 (3+) (N) NEGATIVE mg/dL    Glucose, Urine 150 (2+) (A) NEGATIVE mg/dL    Blood, Urine MODERATE (2+) (A) NEGATIVE    Ketones, Urine NEGATIVE NEGATIVE mg/dL    Bilirubin, Urine NEGATIVE NEGATIVE    Urobilinogen, Urine <2.0 <2.0 mg/dL    Nitrite, Urine NEGATIVE NEGATIVE    Leukocyte Esterase, Urine TRACE (A) NEGATIVE   Microscopic Only, Urine   Result Value Ref Range    WBC, Urine 6-10 (A) 1-5, NONE /HPF    WBC Clumps, Urine OCCASIONAL Reference range not established. /HPF    RBC, Urine 11-20 (A) NONE, 1-2, 3-5 /HPF    Squamous Epithelial Cells, Urine 1-9 (SPARSE) Reference range not established. /HPF    Bacteria, Urine 1+ (A) NONE SEEN /HPF    Budding Yeast, Urine PRESENT (A) NONE /HPF    Mucus, Urine FEW Reference range not established. /LPF    Hyaline Casts, Urine 2+ (A) NONE /LPF       Cardiac catheterization - non-coronary   Final Result      Transthoracic Echo (TTE) Complete   Final Result      XR chest 1 view   Final Result   Marked interval improvement in the right pleural effusion with   interval thoracentesis. No pneumothorax.        Pulmonary vascular congestion        Minimal atelectasis right lung base        MACRO:   None.        Signed by: Rayna Carson 2/2/2024 3:23 PM   Dictation workstation:   [id number]      XR knee left 4+ views   Final Result   1. Small joint effusion and degenerative change of the knee without   osseous injury evident. Knowledge of any trauma may be helpful. If   there is persistent concern for osseous  injury, cross-sectional   imaging can be considered.   2. Reticular increased density in the subcutaneous tissues which may   represent lymphedema and/or cellulitis.        MACRO:   None        Signed by: Adama Jonas 2/2/2024 4:24 PM   Dictation workstation:   [id number]      US renal complete   Final Result   No left hydronephrosis. Right total nephrectomy.        MACRO:   None        Signed by: Amando Starks 2/2/2024 2:36 PM   Dictation workstation:   [id number]      XR chest 1 view    (Results Pending)       Transthoracic Echo (TTE) Complete    Result Date: 2/5/2024   [facility], [address] [place] [address]               Tel [phone] and Fax [phone] TRANSTHORACIC ECHOCARDIOGRAM REPORT  Patient Name:      MOE SHANNA MAYA      Reading Physician:    [id number] Nicholas Antonio MD Study Date:        2/5/2024             Ordering Provider:    [id number] DOMONIQUE PURCELL MRN/PID:           [id number]             Fellow: Accession#:        [id number]         Nurse: Date of Birth/Age: 1955 / 68 years Sonographer:          iNna Angel                                                               [facility] Gender:            F                    Additional Staff: Height:            167.64 cm            Admit Date:           2/2/2024 Weight:            102.51 kg            Admission Status:     Inpatient -                                                               Routine BSA:               2.11 m2              Encounter#:           [id number]                                         Department Location:  [facility] Non                                                               Invasive Blood Pressure: 147 /65 mmHg Study Type:    TRANSTHORACIC ECHO (TTE) COMPLETE Diagnosis/ICD: Acute combined systolic (congestive) and diastolic (congestive)                 heart failure (CHF)-I50.41 Indication:    Congestive Heart Failure CPT Code:      Echo Complete w Full [id number] Patient History: Diabetes:         Yes Pertinent         A-Fib, HTN, Dyspnea and LE Edema. Watchman device, elevated History:          BNP,. Study Detail: The following Echo studies were performed: 2D, M-Mode, Doppler and               color flow.  PHYSICIAN INTERPRETATION: Left Ventricle: The left ventricular systolic function is normal, with an estimated ejection fraction of 60-65%. There are no regional wall motion abnormalities. The left ventricular cavity size is normal. Spectral Doppler shows an impaired relaxation pattern of left ventricular diastolic filling. Left Atrium: The left atrium is moderately dilated. Right Ventricle: The right ventricle is mildly enlarged. There is mildly reduced right ventricular systolic function. Right Atrium: The right atrium is moderately dilated. Aortic Valve: The aortic valve is trileaflet. There is mild aortic valve cusp calcification. There is trivial aortic valve regurgitation. The peak instantaneous gradient of the aortic valve is 14.1 mmHg. The mean gradient of the aortic valve is 9.1 mmHg. Mitral Valve: The mitral valve is normal in structure. There is mild mitral annular calcification. There is mild to moderate mitral valve regurgitation. Tricuspid Valve: The tricuspid valve is structurally normal. There is moderate tricuspid regurgitation. Pulmonic Valve: The pulmonic valve is structurally normal. There is mild pulmonic valve regurgitation. Pericardium: There is a small to moderate pericardial effusion posterior to the left ventricle. There is no evidence of cardiac tamponade. Aorta: The aortic root is normal. Pulmonary Artery: The tricuspid regurgitant velocity is 3.04 m/s, and with an estimated right atrial pressure of 3 mmHg, the estimated pulmonary artery pressure is mildly elevated with the RVSP at 39.9 mmHg. Pulmonary Veins: The pulmonary veins  appear normal and return normally to the left atrium. Systemic Veins: The inferior vena cava appears to be of normal size. There is IVC inspiratory collapse greater than 50%.  CONCLUSIONS:  1. Left ventricular systolic function is normal with a 60-65% estimated ejection fraction.  2. Spectral Doppler shows an impaired relaxation pattern of left ventricular diastolic filling.  3. There is mildly reduced right ventricular systolic function.  4. The left atrium is moderately dilated.  5. The right atrium is moderately dilated.  6. There is a small to moderate pericardial effusion.  7. There is no evidence of cardiac tamponade.  8. Mild to moderate mitral valve regurgitation.  9. Moderate tricuspid regurgitation visualized. 10. Normal CVP. Estimated PASP around 45 mm Hg. QUANTITATIVE DATA SUMMARY: 2D MEASUREMENTS:                           Normal Ranges: IVSd:          1.52 cm    (0.6-1.1cm) LVPWd:         1.42 cm    (0.6-1.1cm) LVIDd:         4.35 cm    (3.9-5.9cm) LVIDs:         2.94 cm LV Mass Index: 120.7 g/m2 LV % FS        32.3 % LA VOLUME:                              Normal Ranges: LA Vol A4C:       79.5 ml    (22+/-6mL/m2) LA Vol A2C:       78.2 ml LA Vol BP:        79.7 ml LA Vol Index A4C: 37.7ml/m2 LA Vol Index A2C: 37.1 ml/m2 LA Vol Index BP:  37.9 ml/m2 LA Volume Index:  37.8 ml/m2 LA Vol A4C:       76.0 ml LA Vol A2C:       73.9 ml RA VOLUME BY A/L METHOD:                       Normal Ranges: RA Area A4C: 17.8 cm2 M-MODE MEASUREMENTS:                  Normal Ranges: Ao Root: 3.00 cm (2.0-3.7cm) LAs:     5.08 cm (2.7-4.0cm) LV SYSTOLIC FUNCTION BY 2D PLANIMETRY (MOD):                     Normal Ranges: EF-A4C View: 63.4 % (>=55%) EF-A2C View: 63.8 % EF-Biplane:  62.5 % LV DIASTOLIC FUNCTION:                             Normal Ranges: MV Peak E:      1.33 m/s    (0.7-1.2 m/s) MV Peak A:      0.53 m/s    (0.42-0.7 m/s) E/A Ratio:      2.52        (1.0-2.2) MV e'           0.06 m/s    (>8.0) MV lateral e'    0.06 m/s MV medial e'    0.06 m/s MV A Dur:       110.73 msec E/e' Ratio:     22.09       (<8.0) PulmV Sys Mike:  53.97 cm/s PulmV Dugan Mike: 77.63 cm/s PulmV S/D Mike:  0.70 MITRAL VALVE:                 Normal Ranges: MV DT: 245 msec (150-240msec) MITRAL INSUFFICIENCY:                         Normal Ranges: MR VTI:     171.38 cm MR Vmax:    489.73 cm/s MR Volume:  20.07 ml MR Flow Rt: 57.36 ml/s MR EROA:    0.12 cm2 AORTIC VALVE:                                    Normal Ranges: AoV Vmax:                1.88 m/s  (<=1.7m/s) AoV Peak P.1 mmHg (<20mmHg) AoV Mean P.1 mmHg  (1.7-11.5mmHg) LVOT Max Mike:            0.97 m/s  (<=1.1m/s) AoV VTI:                 46.22 cm  (18-25cm) LVOT VTI:                24.89 cm LVOT Diameter:           1.96 cm   (1.8-2.4cm) AoV Area, VTI:           1.62 cm2  (2.5-5.5cm2) AoV Area,Vmax:           1.56 cm2  (2.5-4.5cm2) AoV Dimensionless Index: 0.54  RIGHT VENTRICLE: RV Basal 3.80 cm RV Mid   2.80 cm RV Major 8.0 cm TAPSE:   18.0 mm RV s'    0.13 m/s TRICUSPID VALVE/RVSP:                             Normal Ranges: Peak TR Velocity: 3.04 m/s RV Syst Pressure: 39.9 mmHg (< 30mmHg) PULMONIC VALVE:                      Normal Ranges: PV Max Mike: 1.0 m/s  (0.6-0.9m/s) PV Max PG:  3.9 mmHg Pulmonary Veins: PulmV Dugan Mike: 77.63 cm/s PulmV S/D Mike:  0.70 PulmV Sys Mike:  53.97 cm/s  [id number] Nicholas Antonio MD Electronically signed on 2024 at 5:06:05 PM  ** Final **           Assessment/Plan   Principal Problem:    CHF (congestive heart failure), NYHA class I, acute on chronic, combined (CMS/HCC)      Echocardiogram from 2023 (post watchman) reviewed: LVEF 55%, small to moderate pericardial effusions           1. Acute on chronic hypoxic/hypercapnic respiratory failure 2/2 acute on chronic diastolic CHF and pneumonia, COPD exac, Pleural effusion  -CXR showed large right pleural effusion  -CTA of the chest showed large right pleural effusion with  complete collapse of the right lower lung, pneumonia, a small pericardial effusion, and soft tissue edema at the chest and abd wall.   -antibiotics  -bronchodilators  -Steroids  -oxygen support/Bipap  -sputum culture  -blood cultures  -Still with ignificant leg to abd swelling and shortness of breath  -BNP 1786  -Strict I & Os  -Daily weights  -2gm na diet  -Repeat echo as above  -Unable to start SGLT2 inhibitors with current GFR  -Pulmonary following, s/p thoracentesis 2.3L  -s/p RHC today showed MEAN PA 30 MILD PULMONARY HTN MEAN RA 9 MM HG MEAN PCWP 23 MM HG CARDIAC OUTPUT 8.03 CI 3.79 NO SHUNTS   -Per nephro notes->increase IV lasix to 80mg BID  -Monitor on tele     2. Elevated troponins-non MI elevation  -Denies chest pain  -Does have shortness of breath-worsening for weeks  -Had a LHC planned 2 weeks ago->unable to lay flat  -Cont asa, plavix, statin, metoprolol  -Unable to do LHC due to Creatinine 3.6  -Monitor on tele     3. Paroxysmal atrial fibrillation with RVR  -s/p Watchman in Aug 2023  -Currently SB  -Cont amiodarone and metoprolol for rate control  -Monitor on tele     4. Hypertension  -stable  -2gm na diet  -cont meds  -monitor     5. Anemia  -Appear stable     6. Acute on Chronic kidney disease  -s/p nephrectomy  -Renal consulted, note reviewed increase lasix to 80mg BID  -Renal US negative  -Monitor closely on Lasix IV     7. Diabetes  -ISS  -Accuchecks     8. Hypothyroidism  -Cont synthroid     9. Pericardial effusion  -Mild to moderate on echo  -Cont diuresis       CARLENE Redmond-CNP

## 2024-02-09 NOTE — CARE PLAN
The patient's goals for the shift include      The clinical goals for the shift include remain hds    Over the shift, the patient did not make progress toward the following goals. Barriers to progression include patient participation.  Recommendations to address these barriers include education.

## 2024-02-09 NOTE — PROGRESS NOTES
Physical Therapy    Physical Therapy Treatment    Patient Name: Daphne Morris  MRN: [id number]  Today's Date: 2/9/2024  Time Calculation  Start Time: 1208  Stop Time: 1225  Time Calculation (min): 17 min       Assessment/Plan   PT Assessment  End of Session Communication: Bedside nurse  End of Session Patient Position: Up in chair, Alarm off, not on at start of session (Tray, call light, phone and menu in reach. Patient stated she wanted to order lunch.)  PT Plan  Inpatient/Swing Bed or Outpatient: Inpatient  PT Plan  Treatment/Interventions: Bed mobility, Transfer training, Gait training, Strengthening, Therapeutic exercise  PT Plan: Skilled PT  PT Frequency: 3 times per week  PT Discharge Recommendations: Moderate intensity level of continued care  Equipment Recommended upon Discharge: Wheeled walker  PT - OK to Discharge: Yes (per PT POC)      General Visit Information:   PT  Visit  PT Received On: 02/09/24  Response to Previous Treatment: Patient with no complaints from previous session.  General  Reason for Referral: 69 yo female referred to OT for CHF, general weakness, impaired ADL, impaired mobility  Referred By: Milton Fishman DO  Past Medical History Relevant to Rehab: heart failure, atrial fibrillation, Hypertension, type 2 DM  Missed Visit: Yes  Missed Visit Reason: Other (Comment) (Nurse and nurse students assisting patient at this time.)  Prior to Session Communication: Bedside nurse  Patient Position Received: Up in chair, Alarm off, not on at start of session  General Comment: Pleasant and agreeable to session (Patient stated someone else was already in to give her therapy and she was tired. Patient agreeable to therapy.)    Subjective   Precautions:  Precautions  Medical Precautions: Fall precautions  Precautions Comment: Patient very self limiting secondary to fear of falling even with education and encouragement.  Vital Signs:  Vital Signs  SpO2: (!) 87 % (SPO2 continuously fluctuating between 72 an  93 but stabilizing at 87%. Nurse aware. 4L O2 via NC.)    Objective   Pain:  Pain Assessment  Pain Assessment: 0-10  Pain Score: 0 - No pain  Cognition:  Cognition  Overall Cognitive Status: Within Functional Limits  Orientation Level: Oriented X4  Postural Control:     Extremity/Trunk Assessments:    Activity Tolerance:  Activity Tolerance  Endurance: Tolerates 10 - 20 min exercise with multiple rests  Activity Tolerance Comments: Patient fatigues quickly  Treatments:  Therapeutic Exercise  Therapeutic Exercise Performed: Yes  Therapeutic Exercise Activity 1: Seated ther ex x 10-15 each  B LE. Rest breaks b/t sets. (Patient becomes fatigued and SPO2 destats to 72% intermittently. Patient quickly recovered to 87% in which patient was able to tolerate seated there ex. No reports of dizziness or light headedness.)         Transfers  Transfer: Yes  Transfer 1  Transfer From 1: Chair with arms to  Transfer to 1: Stand  Transfer Level of Assistance 1: Maximum assistance  Trials/Comments 1: x 5 trials with no success. Patient attempted to stand but stated fear of falling. (Patient also stated she was very tired from the session before. Educated patient on benfits of increasing strength and endurance during therapy session and pacing self when O2 levels de-stat. Patient verbally knows. Educated on breathing exercises to increase O2 levels.)         Outcome Measures:  [facility] Basic Mobility  Turning from your back to your side while in a flat bed without using bedrails: A little  Moving from lying on your back to sitting on the side of a flat bed without using bedrails: A lot  Moving to and from bed to chair (including a wheelchair): A lot  Standing up from a chair using your arms (e.g. wheelchair or bedside chair): A lot  To walk in hospital room: A lot  Climbing 3-5 steps with railing: Total  Basic Mobility - Total Score: 12    Education Documentation  Precautions, taught by Linda Fields PTA at 2/9/2024  1:46  PM.  Learner: Patient  Readiness: Acceptance  Method: Explanation, Demonstration  Response: Verbalizes Understanding, Demonstrated Understanding, Needs Reinforcement    Body Mechanics, taught by Linda Fields PTA at 2/9/2024  1:46 PM.  Learner: Patient  Readiness: Acceptance  Method: Explanation, Demonstration  Response: Verbalizes Understanding, Demonstrated Understanding, Needs Reinforcement    Home Exercise Program, taught by Linda Fields PTA at 2/9/2024  1:46 PM.  Learner: Patient  Readiness: Acceptance  Method: Explanation, Demonstration  Response: Verbalizes Understanding, Demonstrated Understanding, Needs Reinforcement    Education Comments  No comments found.        OP EDUCATION:       Encounter Problems       Encounter Problems (Active)       Mobility       STG - Patient will ambulate 80 feet MOD I with wheeled walker (Progressing)       Start:  02/04/24    Expected End:  02/09/24            STG - Patient will ascend and descend four to six stairs MOD I with B rails (Progressing)       Start:  02/04/24    Expected End:  02/09/24               Pain - Adult          Transfers       STG - Patient to transfer to and from sit to supine independently (Progressing)       Start:  02/04/24    Expected End:  02/09/24            STG - Patient will transfer sit to and from stand MOD I with wheeled walker (Progressing)       Start:  02/04/24    Expected End:  02/09/24

## 2024-02-09 NOTE — PROGRESS NOTES
"Daphne Morris is a 68 y.o. female on day 7 of admission presenting with CHF (congestive heart failure), NYHA class I, acute on chronic, combined (CMS/HCC).      Subjective   Disappointed she has to stay for weekend, otherwise no new complaints       Objective     Last Recorded Vitals  /69 (BP Location: Left arm, Patient Position: Sitting) Comment: RN aware  Pulse 60   Temp 36.6 °C (97.9 °F) (Temporal)   Resp 18   Wt 103 kg (226 lb 8 oz)   SpO2 (!) 87% Comment: SPO2 continuously fluctuating between 72 an 93 but stabilizing at 87%. Nurse aware. 4L O2 via NC.  Intake/Output last 3 Shifts:    Intake/Output Summary (Last 24 hours) at 2/9/2024 1405  Last data filed at 2/9/2024 1348  Gross per 24 hour   Intake 1400 ml   Output 1895 ml   Net -495 ml       Admission Weight  Weight: 99.8 kg (220 lb 0.3 oz) (02/02/24 0404)    Daily Weight  02/09/24 : 103 kg (226 lb 8 oz)    Image Results  No current facility-administered medications on file prior to encounter.     Current Outpatient Medications on File Prior to Encounter   Medication Sig Dispense Refill    albuterol 90 mcg/actuation inhaler Inhale 2 puffs every 4 hours if needed.      amiodarone (Pacerone) 200 mg tablet Take 1 tablet (200 mg) by mouth once daily with a meal.      amLODIPine (Norvasc) 5 mg tablet Take 1 tablet (5 mg) by mouth once daily.      aspirin 81 mg EC tablet Take 1 tablet (81 mg) by mouth once daily.      atorvastatin (Lipitor) 40 mg tablet Take 1 tablet (40 mg) by mouth once daily.      BD Calista 2nd Gen Pen Needle 32 gauge x 5/32\" needle USE SUBCUTANEOUSLY AS DIRECTED TWICE DAILY      clopidogrel (Plavix) 75 mg tablet Take 1 tablet (75 mg) by mouth once daily.      ferrous gluconate 324 (38 Fe) MG tablet Take 1 tablet (324 mg) by mouth once daily with breakfast. 30 tablet 3    FreeStyle Shakir 14 Day Sensor kit USE AS DIRECTED TO CHECK SUGARS 3 TO 4 TIMES DAILY      FreeStyle Lite Strips strip twice a day.      HumaLOG KwikPen Insulin 100 " unit/mL injection up to 15 units Subcutaneous three times a day per sliding scale      ipratropium-albuteroL (Duo-Neb) 0.5-2.5 mg/3 mL nebulizer solution Take 3 mL by nebulization every 6 hours if needed.      Lantus Solostar U-100 Insulin 100 unit/mL (3 mL) pen Inject 14 Units under the skin once daily at bedtime.      levothyroxine (Synthroid, Levoxyl) 200 mcg tablet Take 1 tablet (200 mcg) by mouth once daily in the morning. Take before meals. 90 tablet 3    levothyroxine (Synthroid, Levoxyl) 50 mcg tablet Take 1 tablet (50 mcg) by mouth once daily in the morning. Take before meals.      metoprolol succinate XL (Toprol-XL) 50 mg 24 hr tablet TAKE ONE TABLET BY MOUTH TWO TIMES A  tablet 1    oxygen (O2) gas therapy 2 l/min nasal cannula      pantoprazole (ProtoNix) 40 mg EC tablet Take 1 tablet (40 mg) by mouth 2 times a day. 60 tablet 6    potassium chloride CR 20 mEq ER tablet TAKE ONE TABLET BY MOUTH EVERY DAY WITH FOOD 90 tablet 1    SITagliptin phosphate (Januvia) 100 mg tablet Take 1 tablet (100 mg) by mouth once daily.      Soaanz 60 mg tablet Take 1 tablet by mouth once daily. 30 tablet 3    venlafaxine XR (Effexor-XR) 75 mg 24 hr capsule Take 1 capsule (75 mg) by mouth once daily.        Results for orders placed or performed during the hospital encounter of 02/02/24 (from the past 24 hour(s))   POCT GLUCOSE   Result Value Ref Range    POCT Glucose 163 (H) 74 - 99 mg/dL   POCT GLUCOSE   Result Value Ref Range    POCT Glucose 266 (H) 74 - 99 mg/dL   POCT GLUCOSE   Result Value Ref Range    POCT Glucose 246 (H) 74 - 99 mg/dL   POCT GLUCOSE   Result Value Ref Range    POCT Glucose 191 (H) 74 - 99 mg/dL   Urinalysis with Reflex Microscopic   Result Value Ref Range    Color, Urine Yellow Straw, Yellow    Appearance, Urine Clear Clear    Specific Gravity, Urine 1.011 1.005 - 1.035    pH, Urine 5.0 5.0, 5.5, 6.0, 6.5, 7.0, 7.5, 8.0    Protein, Urine >=500 (3+) (N) NEGATIVE mg/dL    Glucose, Urine 150 (2+)  (A) NEGATIVE mg/dL    Blood, Urine MODERATE (2+) (A) NEGATIVE    Ketones, Urine NEGATIVE NEGATIVE mg/dL    Bilirubin, Urine NEGATIVE NEGATIVE    Urobilinogen, Urine <2.0 <2.0 mg/dL    Nitrite, Urine NEGATIVE NEGATIVE    Leukocyte Esterase, Urine TRACE (A) NEGATIVE   Microscopic Only, Urine   Result Value Ref Range    WBC, Urine 6-10 (A) 1-5, NONE /HPF    WBC Clumps, Urine OCCASIONAL Reference range not established. /HPF    RBC, Urine 11-20 (A) NONE, 1-2, 3-5 /HPF    Squamous Epithelial Cells, Urine 1-9 (SPARSE) Reference range not established. /HPF    Bacteria, Urine 1+ (A) NONE SEEN /HPF    Budding Yeast, Urine PRESENT (A) NONE /HPF    Mucus, Urine FEW Reference range not established. /LPF    Hyaline Casts, Urine 2+ (A) NONE /LPF        Physical Exam  Constitutional:       Comments: Up in chair. Face looks a little thinner   HENT:      Head: Normocephalic.      Nose: Nose normal.      Mouth/Throat:      Mouth: Mucous membranes are dry.   Eyes:      Extraocular Movements: Extraocular movements intact.      Pupils: Pupils are equal, round, and reactive to light.   Neck:      Comments: No jvd  Cardiovascular:      Rate and Rhythm: Normal rate and regular rhythm.      Comments: 2/6 low systolic murmur  Pulmonary:      Effort: Pulmonary effort is normal.      Breath sounds: Normal breath sounds.   Abdominal:      General: Bowel sounds are normal.      Palpations: Abdomen is soft.   Genitourinary:     Comments: Fay in place  Musculoskeletal:      Comments: Dressing over left knee.   Wrappings over both forelegs.   2+ pitting edema into thighs.    Skin:     Comments: Wounds, left leg, see pics   Neurological:      General: No focal deficit present.      Mental Status: She is alert and oriented to person, place, and time.   Psychiatric:         Mood and Affect: Mood normal.         Behavior: Behavior normal.       Relevant Results         Assessment/Plan        Principal Problem:    CHF (congestive heart failure), NYHA  class I, acute on chronic, combined (CMS/HCC)    CKD, progressive. Attempting to diurese further with higher dose lasix, minimal response thus far. Appreciate nephro input. If unable to maintain volume status, will need to start RRT. She is aware of this, I also with Dr Santamaria> need to maintain vega for accurate measurements of urine output  Leg wounds. Continue dressing changes and wound care  Anemia, chronic disease. Stable  Hypertension. Systolics still run on higher side at times. May need to adjust meds  Hx PAF  Acute diastolic heart failure, Appreciate cards input. As above  DM. Sugars have been stable, continue to monitor              Alejandra Marshall MD

## 2024-02-10 LAB
ANION GAP SERPL CALC-SCNC: 12 MMOL/L (ref 10–20)
BUN SERPL-MCNC: 79 MG/DL (ref 6–23)
CALCIUM SERPL-MCNC: 7.3 MG/DL (ref 8.6–10.3)
CHLORIDE SERPL-SCNC: 104 MMOL/L (ref 98–107)
CO2 SERPL-SCNC: 30 MMOL/L (ref 21–32)
CREAT SERPL-MCNC: 3.41 MG/DL (ref 0.5–1.05)
EGFRCR SERPLBLD CKD-EPI 2021: 14 ML/MIN/1.73M*2
GLUCOSE BLD MANUAL STRIP-MCNC: 109 MG/DL (ref 74–99)
GLUCOSE BLD MANUAL STRIP-MCNC: 132 MG/DL (ref 74–99)
GLUCOSE BLD MANUAL STRIP-MCNC: 92 MG/DL (ref 74–99)
GLUCOSE BLD MANUAL STRIP-MCNC: 96 MG/DL (ref 74–99)
GLUCOSE SERPL-MCNC: 121 MG/DL (ref 74–99)
POTASSIUM SERPL-SCNC: 3.7 MMOL/L (ref 3.5–5.3)
SODIUM SERPL-SCNC: 142 MMOL/L (ref 136–145)

## 2024-02-10 PROCEDURE — 2060000001 HC INTERMEDIATE ICU ROOM DAILY

## 2024-02-10 PROCEDURE — 2500000001 HC RX 250 WO HCPCS SELF ADMINISTERED DRUGS (ALT 637 FOR MEDICARE OP): Performed by: INTERNAL MEDICINE

## 2024-02-10 PROCEDURE — 82947 ASSAY GLUCOSE BLOOD QUANT: CPT

## 2024-02-10 PROCEDURE — 2500000002 HC RX 250 W HCPCS SELF ADMINISTERED DRUGS (ALT 637 FOR MEDICARE OP, ALT 636 FOR OP/ED): Performed by: INTERNAL MEDICINE

## 2024-02-10 PROCEDURE — 99232 SBSQ HOSP IP/OBS MODERATE 35: CPT | Performed by: INTERNAL MEDICINE

## 2024-02-10 PROCEDURE — 94640 AIRWAY INHALATION TREATMENT: CPT | Mod: MUE

## 2024-02-10 PROCEDURE — 36415 COLL VENOUS BLD VENIPUNCTURE: CPT | Performed by: INTERNAL MEDICINE

## 2024-02-10 PROCEDURE — 2500000004 HC RX 250 GENERAL PHARMACY W/ HCPCS (ALT 636 FOR OP/ED): Performed by: INTERNAL MEDICINE

## 2024-02-10 PROCEDURE — 2500000002 HC RX 250 W HCPCS SELF ADMINISTERED DRUGS (ALT 637 FOR MEDICARE OP, ALT 636 FOR OP/ED)

## 2024-02-10 PROCEDURE — 2500000004 HC RX 250 GENERAL PHARMACY W/ HCPCS (ALT 636 FOR OP/ED)

## 2024-02-10 PROCEDURE — 2500000001 HC RX 250 WO HCPCS SELF ADMINISTERED DRUGS (ALT 637 FOR MEDICARE OP)

## 2024-02-10 PROCEDURE — 2500000005 HC RX 250 GENERAL PHARMACY W/O HCPCS: Performed by: FAMILY MEDICINE

## 2024-02-10 PROCEDURE — 97530 THERAPEUTIC ACTIVITIES: CPT | Mod: GP

## 2024-02-10 PROCEDURE — 80048 BASIC METABOLIC PNL TOTAL CA: CPT | Performed by: INTERNAL MEDICINE

## 2024-02-10 RX ORDER — AMLODIPINE BESYLATE 5 MG/1
10 TABLET ORAL DAILY
Status: DISCONTINUED | OUTPATIENT
Start: 2024-02-11 | End: 2024-02-15 | Stop reason: HOSPADM

## 2024-02-10 RX ADMIN — ACETAMINOPHEN 650 MG: 325 TABLET ORAL at 10:45

## 2024-02-10 RX ADMIN — CLOPIDOGREL 75 MG: 75 TABLET ORAL at 09:16

## 2024-02-10 RX ADMIN — HEPARIN SODIUM 5000 UNITS: 5000 INJECTION INTRAVENOUS; SUBCUTANEOUS at 12:44

## 2024-02-10 RX ADMIN — ASPIRIN 81 MG: 81 TABLET, COATED ORAL at 09:16

## 2024-02-10 RX ADMIN — FERROUS GLUCONATE 324 MG: 324 TABLET ORAL at 09:17

## 2024-02-10 RX ADMIN — PANTOPRAZOLE SODIUM 40 MG: 40 TABLET, DELAYED RELEASE ORAL at 09:16

## 2024-02-10 RX ADMIN — LEVOTHYROXINE SODIUM 50 MCG: 50 TABLET ORAL at 06:24

## 2024-02-10 RX ADMIN — Medication 3 L/MIN: at 15:22

## 2024-02-10 RX ADMIN — VENLAFAXINE HYDROCHLORIDE 75 MG: 75 CAPSULE, EXTENDED RELEASE ORAL at 09:17

## 2024-02-10 RX ADMIN — FUROSEMIDE 80 MG: 10 INJECTION, SOLUTION INTRAMUSCULAR; INTRAVENOUS at 20:57

## 2024-02-10 RX ADMIN — ATORVASTATIN CALCIUM 40 MG: 40 TABLET, FILM COATED ORAL at 09:16

## 2024-02-10 RX ADMIN — INSULIN GLARGINE 16 UNITS: 100 INJECTION, SOLUTION SUBCUTANEOUS at 21:11

## 2024-02-10 RX ADMIN — HEPARIN SODIUM 5000 UNITS: 5000 INJECTION INTRAVENOUS; SUBCUTANEOUS at 20:57

## 2024-02-10 RX ADMIN — Medication 4 L/MIN: at 07:34

## 2024-02-10 RX ADMIN — FUROSEMIDE 80 MG: 10 INJECTION, SOLUTION INTRAMUSCULAR; INTRAVENOUS at 09:15

## 2024-02-10 RX ADMIN — HEPARIN SODIUM 5000 UNITS: 5000 INJECTION INTRAVENOUS; SUBCUTANEOUS at 06:24

## 2024-02-10 RX ADMIN — AMLODIPINE BESYLATE 5 MG: 5 TABLET ORAL at 09:16

## 2024-02-10 RX ADMIN — LEVOTHYROXINE SODIUM 200 MCG: 100 TABLET ORAL at 06:19

## 2024-02-10 RX ADMIN — Medication 5 MG: at 21:17

## 2024-02-10 RX ADMIN — IPRATROPIUM BROMIDE AND ALBUTEROL SULFATE 3 ML: 2.5; .5 SOLUTION RESPIRATORY (INHALATION) at 07:32

## 2024-02-10 RX ADMIN — METOPROLOL SUCCINATE 50 MG: 50 TABLET, EXTENDED RELEASE ORAL at 20:57

## 2024-02-10 RX ADMIN — AMIODARONE HYDROCHLORIDE 200 MG: 200 TABLET ORAL at 09:16

## 2024-02-10 RX ADMIN — METOPROLOL SUCCINATE 50 MG: 50 TABLET, EXTENDED RELEASE ORAL at 09:16

## 2024-02-10 RX ADMIN — PANTOPRAZOLE SODIUM 40 MG: 40 TABLET, DELAYED RELEASE ORAL at 20:57

## 2024-02-10 RX ADMIN — IPRATROPIUM BROMIDE AND ALBUTEROL SULFATE 3 ML: 2.5; .5 SOLUTION RESPIRATORY (INHALATION) at 15:19

## 2024-02-10 ASSESSMENT — COGNITIVE AND FUNCTIONAL STATUS - GENERAL
EATING MEALS: A LITTLE
MOVING TO AND FROM BED TO CHAIR: A LOT
DAILY ACTIVITIY SCORE: 15
STANDING UP FROM CHAIR USING ARMS: A LOT
MOVING FROM LYING ON BACK TO SITTING ON SIDE OF FLAT BED WITH BEDRAILS: A LITTLE
STANDING UP FROM CHAIR USING ARMS: A LOT
MOVING FROM LYING ON BACK TO SITTING ON SIDE OF FLAT BED WITH BEDRAILS: A LOT
MOBILITY SCORE: 12
MOVING TO AND FROM BED TO CHAIR: A LOT
HELP NEEDED FOR BATHING: A LOT
MOBILITY SCORE: 12
WALKING IN HOSPITAL ROOM: A LOT
WALKING IN HOSPITAL ROOM: A LOT
MOVING FROM LYING ON BACK TO SITTING ON SIDE OF FLAT BED WITH BEDRAILS: A LOT
MOBILITY SCORE: 12
CLIMB 3 TO 5 STEPS WITH RAILING: TOTAL
STANDING UP FROM CHAIR USING ARMS: A LOT
DRESSING REGULAR UPPER BODY CLOTHING: A LITTLE
DRESSING REGULAR UPPER BODY CLOTHING: A LITTLE
TURNING FROM BACK TO SIDE WHILE IN FLAT BAD: A LOT
WALKING IN HOSPITAL ROOM: A LOT
DRESSING REGULAR LOWER BODY CLOTHING: A LOT
DRESSING REGULAR LOWER BODY CLOTHING: A LOT
TOILETING: A LOT
DAILY ACTIVITIY SCORE: 15
TURNING FROM BACK TO SIDE WHILE IN FLAT BAD: A LOT
CLIMB 3 TO 5 STEPS WITH RAILING: A LOT
MOVING TO AND FROM BED TO CHAIR: A LOT
CLIMB 3 TO 5 STEPS WITH RAILING: A LOT
TOILETING: A LOT
PERSONAL GROOMING: A LITTLE
TURNING FROM BACK TO SIDE WHILE IN FLAT BAD: A LOT
EATING MEALS: A LITTLE
HELP NEEDED FOR BATHING: A LOT
PERSONAL GROOMING: A LITTLE

## 2024-02-10 ASSESSMENT — PAIN SCALES - GENERAL
PAINLEVEL_OUTOF10: 0 - NO PAIN
PAINLEVEL_OUTOF10: 0 - NO PAIN
PAINLEVEL_OUTOF10: 3
PAINLEVEL_OUTOF10: 0 - NO PAIN

## 2024-02-10 ASSESSMENT — PAIN - FUNCTIONAL ASSESSMENT
PAIN_FUNCTIONAL_ASSESSMENT: 0-10

## 2024-02-10 ASSESSMENT — PAIN DESCRIPTION - LOCATION: LOCATION: HEAD

## 2024-02-10 NOTE — PROGRESS NOTES
Daphne Morris is a 68 y.o. female on day 8 of admission presenting with CHF (congestive heart failure), NYHA class I, acute on chronic, combined (CMS/[facility]).      Subjective     Daphne was laying in bed. She was much more awake and alert today, reports that she slept very well and feels that her breathing has improved. She is eager to go home. She does have some expiratory wheezes, appears fluid overloaded.     Review of systems:  Constitutional: negative for fever, chills, or malaise  Neuro: negative for dizziness, headache, numbness, tingling  ENT: Negative for nasal congestion or sore throat  Resp: negative for shortness of breath, cough, or wheezing  CV: negative for chest pain, palpitations  GI: negative for abd pain, nausea, vomiting or diarrhea  : negative for dysuria, frequency, or urgency  Skin: negative for lesions, wounds, or rash  Musculoskeletal: Negative for weakness, myalgia, or arthralgia  Endocrine: Negative for polyuria or polydipsia         Objective   Constitutional: Well developed, awake/alert/oriented x3, no distress, alert and cooperative  Eyes: PERRL, EOMI, clear sclera  ENMT: mucous membranes moist, no apparent injury, no lesions seen  Head/Neck: Neck supple, no apparent injury, thyroid without mass or tenderness, No JVD, trachea midline, no bruits  Respiratory/Thorax: Patent airways, CTAB, diminished breath sounds with expiratory wheezing, good chest expansion, thorax symmetric  Cardiovascular: Regular, rate and rhythm, no murmurs, 2+ equal pulses of the extremities, normal S 1and S 2  Gastrointestinal: Nondistended, soft, non-tender, no rebound tenderness or guarding, no masses palpable, no organomegaly, +BS, no bruits  Musculoskeletal: ROM intact, no joint swelling, normal strength  Extremities: lower extremities wrapped with ace wrap, 2+ pitting edema above wraps, no contusions or wounds, no clubbing  Neurological: alert and oriented x3, intact senses, motor, response and reflexes,  "normal strength  Lymphatic: No significant lymphadenopathy  Psychological: Appropriate mood and behavior  Skin: Warm and dry, no lesions, no rashes      Last Recorded Vitals  /68 (BP Location: Left arm, Patient Position: Lying)   Pulse 57   Temp 36.5 °C (97.7 °F) (Temporal)   Resp 18   Ht 1.676 m (5' 6\")   Wt 96.6 kg (212 lb 15.4 oz)   SpO2 92%   BMI 34.37 kg/m²     Intake/Output last 3 Shifts:  I/O last 3 completed shifts:  In: 1400 (14.5 mL/kg) [P.O.:1400]  Out: 2845 (29.5 mL/kg) [Urine:2845 (0.8 mL/kg/hr)]  Weight: 96.6 kg   No intake/output data recorded.    Relevant Results  Scheduled medications  amiodarone, 200 mg, oral, Daily with breakfast  amLODIPine, 5 mg, oral, Daily  aspirin, 81 mg, oral, Daily  atorvastatin, 40 mg, oral, Daily  clopidogrel, 75 mg, oral, Daily  ferrous gluconate, 324 mg, oral, Daily with breakfast  furosemide, 80 mg, intravenous, q12h  heparin (porcine), 5,000 Units, subcutaneous, q8h  insulin glargine, 16 Units, subcutaneous, Nightly  insulin lispro, 0-15 Units, subcutaneous, TID with meals  ipratropium-albuteroL, 3 mL, nebulization, TID  levothyroxine, 200 mcg, oral, Daily before breakfast  levothyroxine, 50 mcg, oral, Daily before breakfast  metoprolol succinate XL, 50 mg, oral, BID  pantoprazole, 40 mg, oral, BID  [Held by provider] potassium chloride CR, 20 mEq, oral, Daily  [Held by provider] SITagliptin phosphate, 25 mg, oral, Daily  venlafaxine XR, 75 mg, oral, Daily      Continuous medications     PRN medications  PRN medications: acetaminophen, dextrose 10 % in water (D10W), dextrose, glucagon, HYDROmorphone, ipratropium-albuteroL, melatonin, oxygen, oxygen, oxygen    Results for orders placed or performed during the hospital encounter of 02/02/24 (from the past 24 hour(s))   POCT GLUCOSE   Result Value Ref Range    POCT Glucose 191 (H) 74 - 99 mg/dL   Urinalysis with Reflex Microscopic   Result Value Ref Range    Color, Urine Yellow Straw, Yellow    Appearance, " Urine Clear Clear    Specific Gravity, Urine 1.011 1.005 - 1.035    pH, Urine 5.0 5.0, 5.5, 6.0, 6.5, 7.0, 7.5, 8.0    Protein, Urine >=500 (3+) (N) NEGATIVE mg/dL    Glucose, Urine 150 (2+) (A) NEGATIVE mg/dL    Blood, Urine MODERATE (2+) (A) NEGATIVE    Ketones, Urine NEGATIVE NEGATIVE mg/dL    Bilirubin, Urine NEGATIVE NEGATIVE    Urobilinogen, Urine <2.0 <2.0 mg/dL    Nitrite, Urine NEGATIVE NEGATIVE    Leukocyte Esterase, Urine TRACE (A) NEGATIVE   Microscopic Only, Urine   Result Value Ref Range    WBC, Urine 6-10 (A) 1-5, NONE /HPF    WBC Clumps, Urine OCCASIONAL Reference range not established. /HPF    RBC, Urine 11-20 (A) NONE, 1-2, 3-5 /HPF    Squamous Epithelial Cells, Urine 1-9 (SPARSE) Reference range not established. /HPF    Bacteria, Urine 1+ (A) NONE SEEN /HPF    Budding Yeast, Urine PRESENT (A) NONE /HPF    Mucus, Urine FEW Reference range not established. /LPF    Hyaline Casts, Urine 2+ (A) NONE /LPF   POCT GLUCOSE   Result Value Ref Range    POCT Glucose 156 (H) 74 - 99 mg/dL   POCT GLUCOSE   Result Value Ref Range    POCT Glucose 280 (H) 74 - 99 mg/dL   Basic Metabolic Panel   Result Value Ref Range    Glucose 121 (H) 74 - 99 mg/dL    Sodium 142 136 - 145 mmol/L    Potassium 3.7 3.5 - 5.3 mmol/L    Chloride 104 98 - 107 mmol/L    Bicarbonate 30 21 - 32 mmol/L    Anion Gap 12 10 - 20 mmol/L    Urea Nitrogen 79 (H) 6 - 23 mg/dL    Creatinine 3.41 (H) 0.50 - 1.05 mg/dL    eGFR 14 (L) >60 mL/min/1.73m*2    Calcium 7.3 (L) 8.6 - 10.3 mg/dL   POCT GLUCOSE   Result Value Ref Range    POCT Glucose 109 (H) 74 - 99 mg/dL       XR chest 1 view   Final Result   Large right pleural effusion, significantly increased compared to 1   week ago.             Signed by: Sukumar Daugherty 2/9/2024 3:56 PM   Dictation workstation:   [id number]      Cardiac catheterization - non-coronary   Final Result      Transthoracic Echo (TTE) Complete   Final Result      XR chest 1 view   Final Result   Marked interval improvement in  the right pleural effusion with   interval thoracentesis. No pneumothorax.        Pulmonary vascular congestion        Minimal atelectasis right lung base        MACRO:   None.        Signed by: Rayna Carson 2/2/2024 3:23 PM   Dictation workstation:   [id number]      XR knee left 4+ views   Final Result   1. Small joint effusion and degenerative change of the knee without   osseous injury evident. Knowledge of any trauma may be helpful. If   there is persistent concern for osseous injury, cross-sectional   imaging can be considered.   2. Reticular increased density in the subcutaneous tissues which may   represent lymphedema and/or cellulitis.        MACRO:   None        Signed by: Adama Jonas 2/2/2024 4:24 PM   Dictation workstation:   [id number]      US renal complete   Final Result   No left hydronephrosis. Right total nephrectomy.        MACRO:   None        Signed by: Amando Starks 2/2/2024 2:36 PM   Dictation workstation:   [id number]          Transthoracic Echo (TTE) Complete    Result Date: 2/5/2024   [facility], [address] [place] [address]               Tel [phone] and Fax [phone] TRANSTHORACIC ECHOCARDIOGRAM REPORT  Patient Name:      MOE OTERO ZULMA      Reading Physician:    [id number] Nicholas Antonio MD Study Date:        2/5/2024             Ordering Provider:    [id number] DOMONIQUE PURCELL MRN/PID:           [id number]             Fellow: Accession#:        [id number]         Nurse: Date of Birth/Age: 1955 / 68 years Sonographer:          Nina Angel RDCS Gender:            F                    Additional Staff: Height:            167.64 cm            Admit Date:           2/2/2024 Weight:            102.51 kg            Admission Status:     Inpatient -                                                                Routine BSA:               2.11 m2              Encounter#:           [id number]                                         Department Location:  [facility] Non                                                               Invasive Blood Pressure: 147 /65 mmHg Study Type:    TRANSTHORACIC ECHO (TTE) COMPLETE Diagnosis/ICD: Acute combined systolic (congestive) and diastolic (congestive)                heart failure (CHF)-I50.41 Indication:    Congestive Heart Failure CPT Code:      Echo Complete w Full Doppler-26091 Patient History: Diabetes:         Yes Pertinent         A-Fib, HTN, Dyspnea and LE Edema. Watchman device, elevated History:          BNP,. Study Detail: The following Echo studies were performed: 2D, M-Mode, Doppler and               color flow.  PHYSICIAN INTERPRETATION: Left Ventricle: The left ventricular systolic function is normal, with an estimated ejection fraction of 60-65%. There are no regional wall motion abnormalities. The left ventricular cavity size is normal. Spectral Doppler shows an impaired relaxation pattern of left ventricular diastolic filling. Left Atrium: The left atrium is moderately dilated. Right Ventricle: The right ventricle is mildly enlarged. There is mildly reduced right ventricular systolic function. Right Atrium: The right atrium is moderately dilated. Aortic Valve: The aortic valve is trileaflet. There is mild aortic valve cusp calcification. There is trivial aortic valve regurgitation. The peak instantaneous gradient of the aortic valve is 14.1 mmHg. The mean gradient of the aortic valve is 9.1 mmHg. Mitral Valve: The mitral valve is normal in structure. There is mild mitral annular calcification. There is mild to moderate mitral valve regurgitation. Tricuspid Valve: The tricuspid valve is structurally normal. There is moderate tricuspid regurgitation. Pulmonic Valve: The pulmonic valve is structurally normal. There is  mild pulmonic valve regurgitation. Pericardium: There is a small to moderate pericardial effusion posterior to the left ventricle. There is no evidence of cardiac tamponade. Aorta: The aortic root is normal. Pulmonary Artery: The tricuspid regurgitant velocity is 3.04 m/s, and with an estimated right atrial pressure of 3 mmHg, the estimated pulmonary artery pressure is mildly elevated with the RVSP at 39.9 mmHg. Pulmonary Veins: The pulmonary veins appear normal and return normally to the left atrium. Systemic Veins: The inferior vena cava appears to be of normal size. There is IVC inspiratory collapse greater than 50%.  CONCLUSIONS:  1. Left ventricular systolic function is normal with a 60-65% estimated ejection fraction.  2. Spectral Doppler shows an impaired relaxation pattern of left ventricular diastolic filling.  3. There is mildly reduced right ventricular systolic function.  4. The left atrium is moderately dilated.  5. The right atrium is moderately dilated.  6. There is a small to moderate pericardial effusion.  7. There is no evidence of cardiac tamponade.  8. Mild to moderate mitral valve regurgitation.  9. Moderate tricuspid regurgitation visualized. 10. Normal CVP. Estimated PASP around 45 mm Hg. QUANTITATIVE DATA SUMMARY: 2D MEASUREMENTS:                           Normal Ranges: IVSd:          1.52 cm    (0.6-1.1cm) LVPWd:         1.42 cm    (0.6-1.1cm) LVIDd:         4.35 cm    (3.9-5.9cm) LVIDs:         2.94 cm LV Mass Index: 120.7 g/m2 LV % FS        32.3 % LA VOLUME:                              Normal Ranges: LA Vol A4C:       79.5 ml    (22+/-6mL/m2) LA Vol A2C:       78.2 ml LA Vol BP:        79.7 ml LA Vol Index A4C: 37.7ml/m2 LA Vol Index A2C: 37.1 ml/m2 LA Vol Index BP:  37.9 ml/m2 LA Volume Index:  37.8 ml/m2 LA Vol A4C:       76.0 ml LA Vol A2C:       73.9 ml RA VOLUME BY A/L METHOD:                       Normal Ranges: RA Area A4C: 17.8 cm2 M-MODE MEASUREMENTS:                  Normal  Ranges: Ao Root: 3.00 cm (2.0-3.7cm) LAs:     5.08 cm (2.7-4.0cm) LV SYSTOLIC FUNCTION BY 2D PLANIMETRY (MOD):                     Normal Ranges: EF-A4C View: 63.4 % (>=55%) EF-A2C View: 63.8 % EF-Biplane:  62.5 % LV DIASTOLIC FUNCTION:                             Normal Ranges: MV Peak E:      1.33 m/s    (0.7-1.2 m/s) MV Peak A:      0.53 m/s    (0.42-0.7 m/s) E/A Ratio:      2.52        (1.0-2.2) MV e'           0.06 m/s    (>8.0) MV lateral e'   0.06 m/s MV medial e'    0.06 m/s MV A Dur:       110.73 msec E/e' Ratio:     22.09       (<8.0) PulmV Sys Mike:  53.97 cm/s PulmV Dugan Mike: 77.63 cm/s PulmV S/D Mike:  0.70 MITRAL VALVE:                 Normal Ranges: MV DT: 245 msec (150-240msec) MITRAL INSUFFICIENCY:                         Normal Ranges: MR VTI:     171.38 cm MR Vmax:    489.73 cm/s MR Volume:  20.07 ml MR Flow Rt: 57.36 ml/s MR EROA:    0.12 cm2 AORTIC VALVE:                                    Normal Ranges: AoV Vmax:                1.88 m/s  (<=1.7m/s) AoV Peak P.1 mmHg (<20mmHg) AoV Mean P.1 mmHg  (1.7-11.5mmHg) LVOT Max Mike:            0.97 m/s  (<=1.1m/s) AoV VTI:                 46.22 cm  (18-25cm) LVOT VTI:                24.89 cm LVOT Diameter:           1.96 cm   (1.8-2.4cm) AoV Area, VTI:           1.62 cm2  (2.5-5.5cm2) AoV Area,Vmax:           1.56 cm2  (2.5-4.5cm2) AoV Dimensionless Index: 0.54  RIGHT VENTRICLE: RV Basal 3.80 cm RV Mid   2.80 cm RV Major 8.0 cm TAPSE:   18.0 mm RV s'    0.13 m/s TRICUSPID VALVE/RVSP:                             Normal Ranges: Peak TR Velocity: 3.04 m/s RV Syst Pressure: 39.9 mmHg (< 30mmHg) PULMONIC VALVE:                      Normal Ranges: PV Max Mike: 1.0 m/s  (0.6-0.9m/s) PV Max PG:  3.9 mmHg Pulmonary Veins: PulmV Dugan Mike: 77.63 cm/s PulmV S/D Mike:  0.70 PulmV Sys Mike:  53.97 cm/s  [id number] Nicholas Antonio MD Electronically signed on 2024 at 5:06:05 PM  ** Final **           Assessment/Plan   Principal  Problem:    CHF (congestive heart failure), NYHA class I, acute on chronic, combined (CMS/[facility])    Echocardiogram from August 2023 (post watchman) reviewed: LVEF 55%, small to moderate pericardial effusions        1. Acute on chronic hypoxic/hypercapnic respiratory failure 2/2 acute on chronic diastolic CHF and pneumonia, COPD exac, Pleural effusion  -CXR showed large right pleural effusion  -CTA of the chest showed large right pleural effusion with complete collapse of the right lower lung, pneumonia, a small pericardial effusion, and soft tissue edema at the chest and abd wall.   -antibiotics  -bronchodilators  -Steroids  -oxygen support/Bipap  -sputum culture  -blood cultures  -Still with ignificant leg to abd swelling and shortness of breath  -BNP 1786  -Strict I & Os  -Daily weights  -2gm na diet  -Repeat echo as above  -Unable to start SGLT2 inhibitors with current GFR  -Pulmonary following, s/p thoracentesis 2.3L  -s/p RHC today showed MEAN PA 30 MILD PULMONARY HTN MEAN RA 9 MM HG MEAN PCWP 23 MM HG CARDIAC OUTPUT 8.03 CI 3.79 NO SHUNTS   -Per nephro notes->increase IV lasix to 80mg BID  -Monitor on tele     2. Elevated troponins-non MI elevation  -Denies chest pain  -Does have shortness of breath-worsening for weeks  -Had a LHC planned 2 weeks ago->unable to lay flat  -Cont asa, plavix, statin, metoprolol  -Unable to do LHC due to Creatinine 3.6  -Monitor on tele     3. Paroxysmal atrial fibrillation with RVR  -s/p Watchman in Aug 2023  -Currently SB  -Cont amiodarone and metoprolol for rate control  -Monitor on tele     4. Hypertension  -stable  -2gm na diet  -cont meds  -monitor     5. Anemia  -Appear stable     6. Acute on Chronic kidney disease  -s/p nephrectomy  -Renal consulted, note reviewed increase lasix to 80mg BID  - Per Renal will possibly need HD  -Renal US negative  -Monitor closely on Lasix IV     7. Diabetes  -ISS  -Accuchecks     8. Hypothyroidism  -Cont synthroid     9. Pericardial  effusion  -Mild to moderate on echo  -Cont diuresis      Sussy Gonzalez, APRN-CNP

## 2024-02-10 NOTE — PROGRESS NOTES
Daphne Morris is a 68 y.o. female on day 8 of admission presenting with CHF (congestive heart failure), NYHA class I, acute on chronic, combined (CMS/HCC).      Subjective   Resting comfortably       Objective     Last Recorded Vitals  /64   Pulse 58   Temp 36.4 °C (97.5 °F)   Resp 18   Wt 96.6 kg (212 lb 15.4 oz)   SpO2 97%   Intake/Output last 3 Shifts:    Intake/Output Summary (Last 24 hours) at 2/10/2024 1321  Last data filed at 2/10/2024 1300  Gross per 24 hour   Intake 840 ml   Output 1770 ml   Net -930 ml       Admission Weight  Weight: 99.8 kg (220 lb 0.3 oz) (02/02/24 0404)    Daily Weight  02/10/24 : 96.6 kg (212 lb 15.4 oz)    Image Results  XR chest 1 view  Narrative: Interpreted By:  Sukumar Daugherty,   STUDY:  XR CHEST 1 VIEW;  2/9/2024 1:13 pm      INDICATION:  Signs/Symptoms:fever.      COMPARISON:  02/02/2024      ACCESSION NUMBER(S):  [id number]      ORDERING CLINICIAN:  TONIA MONTANA      FINDINGS:  Significantly increased large right pleural effusion with overlying  atelectasis. Left lung grossly clear. Cardiomediastinal silhouette  unchanged. Aortic atherosclerosis. Pulmonary vascular congestion.      Impression: Large right pleural effusion, significantly increased compared to 1  week ago.          Signed by: Sukumar Daugherty 2/9/2024 3:56 PM  Dictation workstation:   [id number]    No current facility-administered medications on file prior to encounter.     Current Outpatient Medications on File Prior to Encounter   Medication Sig Dispense Refill    albuterol 90 mcg/actuation inhaler Inhale 2 puffs every 4 hours if needed.      amiodarone (Pacerone) 200 mg tablet Take 1 tablet (200 mg) by mouth once daily with a meal.      amLODIPine (Norvasc) 5 mg tablet Take 1 tablet (5 mg) by mouth once daily.      aspirin 81 mg EC tablet Take 1 tablet (81 mg) by mouth once daily.      atorvastatin (Lipitor) 40 mg tablet Take 1 tablet (40 mg) by mouth once daily.      BD Calista 2nd Gen Pen Needle 32  "gauge x 5/32\" needle USE SUBCUTANEOUSLY AS DIRECTED TWICE DAILY      clopidogrel (Plavix) 75 mg tablet Take 1 tablet (75 mg) by mouth once daily.      ferrous gluconate 324 (38 Fe) MG tablet Take 1 tablet (324 mg) by mouth once daily with breakfast. 30 tablet 3    FreeStyle Shakir 14 Day Sensor kit USE AS DIRECTED TO CHECK SUGARS 3 TO 4 TIMES DAILY      FreeStyle Lite Strips strip twice a day.      HumaLOG KwikPen Insulin 100 unit/mL injection up to 15 units Subcutaneous three times a day per sliding scale      ipratropium-albuteroL (Duo-Neb) 0.5-2.5 mg/3 mL nebulizer solution Take 3 mL by nebulization every 6 hours if needed.      Lantus Solostar U-100 Insulin 100 unit/mL (3 mL) pen Inject 14 Units under the skin once daily at bedtime.      levothyroxine (Synthroid, Levoxyl) 200 mcg tablet Take 1 tablet (200 mcg) by mouth once daily in the morning. Take before meals. 90 tablet 3    levothyroxine (Synthroid, Levoxyl) 50 mcg tablet Take 1 tablet (50 mcg) by mouth once daily in the morning. Take before meals.      metoprolol succinate XL (Toprol-XL) 50 mg 24 hr tablet TAKE ONE TABLET BY MOUTH TWO TIMES A  tablet 1    oxygen (O2) gas therapy 2 l/min nasal cannula      pantoprazole (ProtoNix) 40 mg EC tablet Take 1 tablet (40 mg) by mouth 2 times a day. 60 tablet 6    potassium chloride CR 20 mEq ER tablet TAKE ONE TABLET BY MOUTH EVERY DAY WITH FOOD 90 tablet 1    SITagliptin phosphate (Januvia) 100 mg tablet Take 1 tablet (100 mg) by mouth once daily.      Soaanz 60 mg tablet Take 1 tablet by mouth once daily. 30 tablet 3    venlafaxine XR (Effexor-XR) 75 mg 24 hr capsule Take 1 capsule (75 mg) by mouth once daily.        Results for orders placed or performed during the hospital encounter of 02/02/24 (from the past 24 hour(s))   POCT GLUCOSE   Result Value Ref Range    POCT Glucose 156 (H) 74 - 99 mg/dL   POCT GLUCOSE   Result Value Ref Range    POCT Glucose 280 (H) 74 - 99 mg/dL   Basic Metabolic Panel "   Result Value Ref Range    Glucose 121 (H) 74 - 99 mg/dL    Sodium 142 136 - 145 mmol/L    Potassium 3.7 3.5 - 5.3 mmol/L    Chloride 104 98 - 107 mmol/L    Bicarbonate 30 21 - 32 mmol/L    Anion Gap 12 10 - 20 mmol/L    Urea Nitrogen 79 (H) 6 - 23 mg/dL    Creatinine 3.41 (H) 0.50 - 1.05 mg/dL    eGFR 14 (L) >60 mL/min/1.73m*2    Calcium 7.3 (L) 8.6 - 10.3 mg/dL   POCT GLUCOSE   Result Value Ref Range    POCT Glucose 109 (H) 74 - 99 mg/dL   POCT GLUCOSE   Result Value Ref Range    POCT Glucose 96 74 - 99 mg/dL        Physical Exam  Constitutional:       Appearance: Normal appearance.   HENT:      Head: Normocephalic.      Nose: Nose normal.      Mouth/Throat:      Mouth: Mucous membranes are dry.   Eyes:      Extraocular Movements: Extraocular movements intact.      Pupils: Pupils are equal, round, and reactive to light.   Cardiovascular:      Comments: Regular, 2-3/6 low systolic murmur. S4  Pulmonary:      Comments: Inspiratory crackles bilaterally  Easy respirations  Abdominal:      General: Bowel sounds are normal.      Palpations: Abdomen is soft.   Musculoskeletal:      Comments: Legs wrapped. Edema persists above proximal dressings. LUE wound, on knee as well   Neurological:      General: No focal deficit present.      Mental Status: She is alert.   Psychiatric:         Mood and Affect: Mood normal.         Behavior: Behavior normal.         Relevant Results       Assessment/Plan        This patient has a urinary catheter   Reason for the urinary catheter remaining today? critically ill patient who need accurate urinary output measurements    Principal Problem:    CHF (congestive heart failure), NYHA class I, acute on chronic, combined (CMS/HCC)    Decompensated heart failure combined. Difficulty removing fluids secondary to renal disease. 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 Some consideration being given to RRT,  depending on how she responds to the lasix. Creat seems to be stable so far  Hypertension. Systolics continue to run high, will increase norvasc to 10 mg  Continue wound care to LLE  Hypothyroid, stable on supplement  DM. Monitor, with coverage  Deconditioning, continue therapy as able              Alejandra Marshall MD

## 2024-02-10 NOTE — CARE PLAN
The patient's goals for the shift include      The clinical goals for the shift include Patients SpO2 will remain 92% or greater this shift    Over the shift, the patient did make progress toward the following goals.

## 2024-02-10 NOTE — PROGRESS NOTES
Physical Therapy    Physical Therapy Treatment    Patient Name: Daphne Morris  MRN: [id number]  Today's Date: 2/10/2024  Time Calculation  Start Time: 1348  Stop Time: 1409  Time Calculation (min): 21 min       Assessment/Plan   PT Assessment  PT Assessment Results: Decreased strength, Decreased endurance, Decreased mobility, Obesity  Rehab Prognosis: Good  Evaluation/Treatment Tolerance: Patient limited by fatigue  Medical Staff Made Aware: Yes  Barriers to Participation: Comorbidities, Insight into problems  End of Session Communication: Bedside nurse  End of Session Patient Position: Up in chair, Alarm off, not on at start of session  PT Plan  Inpatient/Swing Bed or Outpatient: Inpatient  PT Plan  Treatment/Interventions: Bed mobility, Transfer training, Gait training, Strengthening, Therapeutic exercise  PT Plan: Skilled PT  PT Frequency: 3 times per week  PT Discharge Recommendations: Moderate intensity level of continued care  Equipment Recommended upon Discharge: Wheeled walker  PT - OK to Discharge: Yes (per PT POC)      General Visit Information:   PT  Visit  PT Received On: 02/10/24  Response to Previous Treatment: Patient with no complaints from previous session.  General  Reason for Referral: 67 yo female referred to OT for CHF, general weakness, impaired ADL, impaired mobility  Referred By: Milton Fishman DO  Past Medical History Relevant to Rehab: heart failure, atrial fibrillation, Hypertension, type 2 DM  Missed Visit: Yes  Missed Visit Reason: Patient refused  Prior to Session Communication: Bedside nurse  Patient Position Received: Bed, 3 rail up, Alarm off, not on at start of session  General Comment: Pt stated multiple times she wanted to go home. Encouragement required with all mobility. Pt resistant to education on safe mobility and FWW use.    Subjective   Precautions:  Precautions  Medical Precautions: Fall precautions  Vital Signs:       Objective   Pain:  Pain Assessment  Pain Assessment:  0-10  Pain Score: 0 - No pain  Cognition:  Cognition  Overall Cognitive Status: Within Functional Limits          Activity Tolerance:  Activity Tolerance  Endurance: Tolerates 10 - 20 min exercise with multiple rests  Treatments:     Bed Mobility  Bed Mobility: Yes  Bed Mobility 1  Bed Mobility 1: Supine to sitting  Level of Assistance 1: Moderate assistance (x1)  Bed Mobility Comments 1: HOB elevated. Twice pt threw self back down due to discouragement. Encouragement required    Ambulation/Gait Training  Ambulation/Gait Training Performed: Yes  Ambulation/Gait Training 1  Surface 1: Level tile  Device 1: Rolling walker  Assistance 1: Minimum assistance (x1)  Quality of Gait 1:  (Forward flexed posture)  Comments/Distance (ft) 1: 2' towards bedside chair. Pt resistant to education of safe hand placement and sequencing, after multiple requests made to ambulate safely she was able to demonstrate  Transfers  Transfer: Yes  Transfer 1  Transfer From 1: Sit to, Bed to  Transfer to 1: Stand  Technique 1: Sit to stand  Transfer Device 1: Walker  Transfer Level of Assistance 1: Maximum assistance (x1)  Trials/Comments 1: First attempt unable to complete. Height of bed raises, pt require max verbal cues for upright posture, increased time required  Transfers 2  Transfer From 2: Stand to  Transfer to 2: Sit, Bed  Technique 2: Stand to sit  Transfer Device 2: Walker  Transfer Level of Assistance 2: Maximum assistance (x1)    Outcome Measures:  [facility] Basic Mobility  Turning from your back to your side while in a flat bed without using bedrails: A lot  Moving from lying on your back to sitting on the side of a flat bed without using bedrails: A lot  Moving to and from bed to chair (including a wheelchair): A lot  Standing up from a chair using your arms (e.g. wheelchair or bedside chair): A lot  To walk in hospital room: A lot  Climbing 3-5 steps with railing: A lot  Basic Mobility - Total Score: 12    Education  Documentation  Body Mechanics, taught by Damaris Jim, PT at 2/10/2024  2:24 PM.  Learner: Patient  Readiness: Nonacceptance  Method: Explanation  Response: Needs Reinforcement    Mobility Training, taught by Damaris Jim PT at 2/10/2024  2:24 PM.  Learner: Patient  Readiness: Nonacceptance  Method: Explanation  Response: Needs Reinforcement    Education Comments  No comments found.        OP EDUCATION:       Encounter Problems       Encounter Problems (Active)       Mobility       STG - Patient will ambulate 80 feet MOD I with wheeled walker (Progressing)       Start:  02/04/24    Expected End:  02/09/24            STG - Patient will ascend and descend four to six stairs MOD I with B rails (Progressing)       Start:  02/04/24    Expected End:  02/09/24               Pain - Adult          Transfers       STG - Patient to transfer to and from sit to supine independently (Progressing)       Start:  02/04/24    Expected End:  02/09/24            STG - Patient will transfer sit to and from stand MOD I with wheeled walker (Progressing)       Start:  02/04/24    Expected End:  02/09/24

## 2024-02-10 NOTE — PROGRESS NOTES
Physical Therapy                 Therapy Communication Note    Patient Name: Daphne Morris  MRN: [id number]  Today's Date: 2/10/2024     Discipline: Physical Therapy    Missed Visit Reason: Missed Visit Reason: Patient refused    Missed Time: Attempt at 0936    Comment: Upon entering room, pt in bed falling in and out of sleep, unable to stay fully awake. Pt deferring PT treatment at this time stating she would like to sleep.

## 2024-02-11 LAB
ANION GAP SERPL CALC-SCNC: 12 MMOL/L (ref 10–20)
BASOPHILS # BLD AUTO: 0.01 X10*3/UL (ref 0–0.1)
BASOPHILS NFR BLD AUTO: 0.1 %
BUN SERPL-MCNC: 75 MG/DL (ref 6–23)
CALCIUM SERPL-MCNC: 7.3 MG/DL (ref 8.6–10.3)
CHLORIDE SERPL-SCNC: 105 MMOL/L (ref 98–107)
CO2 SERPL-SCNC: 30 MMOL/L (ref 21–32)
CREAT SERPL-MCNC: 3.27 MG/DL (ref 0.5–1.05)
EGFRCR SERPLBLD CKD-EPI 2021: 15 ML/MIN/1.73M*2
EOSINOPHIL # BLD AUTO: 0.29 X10*3/UL (ref 0–0.7)
EOSINOPHIL NFR BLD AUTO: 3.6 %
ERYTHROCYTE [DISTWIDTH] IN BLOOD BY AUTOMATED COUNT: 14 % (ref 11.5–14.5)
GLUCOSE BLD MANUAL STRIP-MCNC: 112 MG/DL (ref 74–99)
GLUCOSE BLD MANUAL STRIP-MCNC: 122 MG/DL (ref 74–99)
GLUCOSE BLD MANUAL STRIP-MCNC: 154 MG/DL (ref 74–99)
GLUCOSE BLD MANUAL STRIP-MCNC: 24 MG/DL (ref 74–99)
GLUCOSE BLD MANUAL STRIP-MCNC: 26 MG/DL (ref 74–99)
GLUCOSE BLD MANUAL STRIP-MCNC: 46 MG/DL (ref 74–99)
GLUCOSE SERPL-MCNC: 46 MG/DL (ref 74–99)
HCT VFR BLD AUTO: 28.9 % (ref 36–46)
HGB BLD-MCNC: 8.5 G/DL (ref 12–16)
IMM GRANULOCYTES # BLD AUTO: 0.03 X10*3/UL (ref 0–0.7)
IMM GRANULOCYTES NFR BLD AUTO: 0.4 % (ref 0–0.9)
LYMPHOCYTES # BLD AUTO: 1.28 X10*3/UL (ref 1.2–4.8)
LYMPHOCYTES NFR BLD AUTO: 15.9 %
MCH RBC QN AUTO: 29.6 PG (ref 26–34)
MCHC RBC AUTO-ENTMCNC: 29.4 G/DL (ref 32–36)
MCV RBC AUTO: 101 FL (ref 80–100)
MONOCYTES # BLD AUTO: 0.84 X10*3/UL (ref 0.1–1)
MONOCYTES NFR BLD AUTO: 10.4 %
NEUTROPHILS # BLD AUTO: 5.6 X10*3/UL (ref 1.2–7.7)
NEUTROPHILS NFR BLD AUTO: 69.6 %
NRBC BLD-RTO: 0 /100 WBCS (ref 0–0)
PLATELET # BLD AUTO: 240 X10*3/UL (ref 150–450)
POTASSIUM SERPL-SCNC: 3.5 MMOL/L (ref 3.5–5.3)
RBC # BLD AUTO: 2.87 X10*6/UL (ref 4–5.2)
SODIUM SERPL-SCNC: 143 MMOL/L (ref 136–145)
WBC # BLD AUTO: 8.1 X10*3/UL (ref 4.4–11.3)

## 2024-02-11 PROCEDURE — 2500000004 HC RX 250 GENERAL PHARMACY W/ HCPCS (ALT 636 FOR OP/ED)

## 2024-02-11 PROCEDURE — 36415 COLL VENOUS BLD VENIPUNCTURE: CPT | Performed by: INTERNAL MEDICINE

## 2024-02-11 PROCEDURE — 99233 SBSQ HOSP IP/OBS HIGH 50: CPT | Performed by: INTERNAL MEDICINE

## 2024-02-11 PROCEDURE — 2500000004 HC RX 250 GENERAL PHARMACY W/ HCPCS (ALT 636 FOR OP/ED): Performed by: INTERNAL MEDICINE

## 2024-02-11 PROCEDURE — 85025 COMPLETE CBC W/AUTO DIFF WBC: CPT | Performed by: INTERNAL MEDICINE

## 2024-02-11 PROCEDURE — 94640 AIRWAY INHALATION TREATMENT: CPT | Mod: MUE

## 2024-02-11 PROCEDURE — 2500000002 HC RX 250 W HCPCS SELF ADMINISTERED DRUGS (ALT 637 FOR MEDICARE OP, ALT 636 FOR OP/ED): Performed by: INTERNAL MEDICINE

## 2024-02-11 PROCEDURE — 2500000001 HC RX 250 WO HCPCS SELF ADMINISTERED DRUGS (ALT 637 FOR MEDICARE OP): Performed by: INTERNAL MEDICINE

## 2024-02-11 PROCEDURE — 80048 BASIC METABOLIC PNL TOTAL CA: CPT | Performed by: INTERNAL MEDICINE

## 2024-02-11 PROCEDURE — 2500000001 HC RX 250 WO HCPCS SELF ADMINISTERED DRUGS (ALT 637 FOR MEDICARE OP)

## 2024-02-11 PROCEDURE — 2500000001 HC RX 250 WO HCPCS SELF ADMINISTERED DRUGS (ALT 637 FOR MEDICARE OP): Performed by: NURSE PRACTITIONER

## 2024-02-11 PROCEDURE — 2060000001 HC INTERMEDIATE ICU ROOM DAILY

## 2024-02-11 PROCEDURE — 82947 ASSAY GLUCOSE BLOOD QUANT: CPT | Mod: MUE

## 2024-02-11 PROCEDURE — 2500000002 HC RX 250 W HCPCS SELF ADMINISTERED DRUGS (ALT 637 FOR MEDICARE OP, ALT 636 FOR OP/ED)

## 2024-02-11 PROCEDURE — 2500000005 HC RX 250 GENERAL PHARMACY W/O HCPCS

## 2024-02-11 RX ORDER — LACTULOSE 10 G/15ML
20 SOLUTION ORAL DAILY
Status: DISCONTINUED | OUTPATIENT
Start: 2024-02-11 | End: 2024-02-15 | Stop reason: HOSPADM

## 2024-02-11 RX ORDER — DEXTROSE 50 % IN WATER (D50W) INTRAVENOUS SYRINGE
25 ONCE
Status: DISCONTINUED | OUTPATIENT
Start: 2024-02-11 | End: 2024-02-15 | Stop reason: HOSPADM

## 2024-02-11 RX ORDER — LACTULOSE 10 G/15ML
20 SOLUTION ORAL DAILY
Status: DISCONTINUED | OUTPATIENT
Start: 2024-02-12 | End: 2024-02-15 | Stop reason: HOSPADM

## 2024-02-11 RX ORDER — BISACODYL 10 MG/1
10 SUPPOSITORY RECTAL DAILY
Status: DISCONTINUED | OUTPATIENT
Start: 2024-02-11 | End: 2024-02-15 | Stop reason: HOSPADM

## 2024-02-11 RX ADMIN — FUROSEMIDE 80 MG: 10 INJECTION, SOLUTION INTRAMUSCULAR; INTRAVENOUS at 20:12

## 2024-02-11 RX ADMIN — HEPARIN SODIUM 5000 UNITS: 5000 INJECTION INTRAVENOUS; SUBCUTANEOUS at 04:28

## 2024-02-11 RX ADMIN — LEVOTHYROXINE SODIUM 50 MCG: 50 TABLET ORAL at 06:03

## 2024-02-11 RX ADMIN — ATORVASTATIN CALCIUM 40 MG: 40 TABLET, FILM COATED ORAL at 08:17

## 2024-02-11 RX ADMIN — ASPIRIN 81 MG: 81 TABLET, COATED ORAL at 08:17

## 2024-02-11 RX ADMIN — HEPARIN SODIUM 5000 UNITS: 5000 INJECTION INTRAVENOUS; SUBCUTANEOUS at 12:40

## 2024-02-11 RX ADMIN — PANTOPRAZOLE SODIUM 40 MG: 40 TABLET, DELAYED RELEASE ORAL at 08:17

## 2024-02-11 RX ADMIN — LEVOTHYROXINE SODIUM 200 MCG: 100 TABLET ORAL at 06:02

## 2024-02-11 RX ADMIN — AMLODIPINE BESYLATE 10 MG: 5 TABLET ORAL at 08:28

## 2024-02-11 RX ADMIN — PANTOPRAZOLE SODIUM 40 MG: 40 TABLET, DELAYED RELEASE ORAL at 20:12

## 2024-02-11 RX ADMIN — VENLAFAXINE HYDROCHLORIDE 75 MG: 75 CAPSULE, EXTENDED RELEASE ORAL at 08:18

## 2024-02-11 RX ADMIN — DEXTROSE MONOHYDRATE 25 G: 25 INJECTION, SOLUTION INTRAVENOUS at 07:50

## 2024-02-11 RX ADMIN — FUROSEMIDE 80 MG: 10 INJECTION, SOLUTION INTRAMUSCULAR; INTRAVENOUS at 08:18

## 2024-02-11 RX ADMIN — IPRATROPIUM BROMIDE AND ALBUTEROL SULFATE 3 ML: 2.5; .5 SOLUTION RESPIRATORY (INHALATION) at 19:00

## 2024-02-11 RX ADMIN — IPRATROPIUM BROMIDE AND ALBUTEROL SULFATE 3 ML: 2.5; .5 SOLUTION RESPIRATORY (INHALATION) at 07:28

## 2024-02-11 RX ADMIN — METOPROLOL SUCCINATE 50 MG: 50 TABLET, EXTENDED RELEASE ORAL at 08:16

## 2024-02-11 RX ADMIN — AMIODARONE HYDROCHLORIDE 200 MG: 200 TABLET ORAL at 08:16

## 2024-02-11 RX ADMIN — CLOPIDOGREL 75 MG: 75 TABLET ORAL at 08:17

## 2024-02-11 RX ADMIN — BISACODYL 10 MG: 10 SUPPOSITORY RECTAL at 21:38

## 2024-02-11 RX ADMIN — IPRATROPIUM BROMIDE AND ALBUTEROL SULFATE 3 ML: 2.5; .5 SOLUTION RESPIRATORY (INHALATION) at 14:14

## 2024-02-11 RX ADMIN — HEPARIN SODIUM 5000 UNITS: 5000 INJECTION INTRAVENOUS; SUBCUTANEOUS at 20:12

## 2024-02-11 RX ADMIN — FERROUS GLUCONATE 324 MG: 324 TABLET ORAL at 08:17

## 2024-02-11 ASSESSMENT — PAIN SCALES - GENERAL
PAINLEVEL_OUTOF10: 0 - NO PAIN
PAINLEVEL_OUTOF10: 0 - NO PAIN

## 2024-02-11 ASSESSMENT — COGNITIVE AND FUNCTIONAL STATUS - GENERAL
MOVING FROM LYING ON BACK TO SITTING ON SIDE OF FLAT BED WITH BEDRAILS: A LOT
MOBILITY SCORE: 12
PERSONAL GROOMING: A LITTLE
HELP NEEDED FOR BATHING: A LOT
DRESSING REGULAR LOWER BODY CLOTHING: A LOT
EATING MEALS: A LITTLE
CLIMB 3 TO 5 STEPS WITH RAILING: A LOT
DRESSING REGULAR UPPER BODY CLOTHING: A LITTLE
WALKING IN HOSPITAL ROOM: A LOT
TURNING FROM BACK TO SIDE WHILE IN FLAT BAD: A LOT
TOILETING: A LOT
DAILY ACTIVITIY SCORE: 15
STANDING UP FROM CHAIR USING ARMS: A LOT
MOVING TO AND FROM BED TO CHAIR: A LOT

## 2024-02-11 ASSESSMENT — PAIN - FUNCTIONAL ASSESSMENT: PAIN_FUNCTIONAL_ASSESSMENT: 0-10

## 2024-02-11 NOTE — CARE PLAN
The patient's goals for the shift include      The clinical goals for the shift include Patient will remain HDS this shift    Over the shift, the patient not make progress toward the following goals.

## 2024-02-11 NOTE — PROGRESS NOTES
Subjective Data:  No chest pain or dyspnea, exhausted, poor appetite     Overnight Events:    NoNE     Objective Data:  Last Recorded Vitals:  Vitals:    02/10/24 1900 02/11/24 0426 02/11/24 0816 02/11/24 0949   BP: 155/71 133/57 161/67 (!) 136/94   BP Location: Left arm Left arm  Right arm   Patient Position: Lying Lying  Lying   Pulse: 56 54 59 (!) 45   Resp: 18 20  19   Temp: 36.4 °C (97.5 °F) 36.2 °C (97.2 °F)  35.6 °C (96 °F)   TempSrc: Temporal Temporal  Skin   SpO2: 100% 95%  98%   Weight:  96.8 kg (213 lb 6.5 oz)     Height:           Last Labs:  CBC - 2/11/2024:  5:59 AM  8.1 8.5 240    28.9      CMP - 2/11/2024:  5:59 AM  7.3 5.7 13 --- 0.2   6.3 2.5 12 111      PTT - 12/15/2023:  2:47 PM  1.0   10.9 30     TROPHS   Date/Time Value Ref Range Status   02/01/2024 05:15 AM 18 0 - 13 ng/L Final   07/19/2023 02:31 PM 28 0 - 13 ng/L Final     Comment:     .  Less than 99th percentile of normal range cutoff-  Female and children under 18 years old <14 ng/L; Male <21 ng/L: Negative  Repeat testing should be performed if clinically indicated.   .  Female and children under 18 years old 14-50 ng/L; Male 21-50 ng/L:  Consistent with possible cardiac damage and possible increased clinical   risk. Serial measurements may help to assess extent of myocardial damage.   .  >50 ng/L: Consistent with cardiac damage, increased clinical risk and  myocardial infarction. Serial measurements may help assess extent of   myocardial damage.   .   NOTE: Children less than 1 year old may have higher baseline troponin   levels and results should be interpreted in conjunction with the overall   clinical context.   .  NOTE: Troponin I testing is performed using a different   testing methodology at [facility] than at other   [facility]. Direct result comparisons should only   be made within the same method.     04/26/2023 06:06 PM 32 0 - 13 ng/L Final     Comment:     .  Less than 99th percentile of normal range  cutoff-  Female and children under 18 years old <14 ng/L; Male <21 ng/L: Negative  Repeat testing should be performed if clinically indicated.   .  Female and children under 18 years old 14-50 ng/L; Male 21-50 ng/L:  Consistent with possible cardiac damage and possible increased clinical   risk. Serial measurements may help to assess extent of myocardial damage.   .  >50 ng/L: Consistent with cardiac damage, increased clinical risk and  myocardial infarction. Serial measurements may help assess extent of   myocardial damage.   .   NOTE: Children less than 1 year old may have higher baseline troponin   levels and results should be interpreted in conjunction with the overall   clinical context.   .  NOTE: Troponin I testing is performed using a different   testing methodology at [facility] than at other   [facility]. Direct result comparisons should only   be made within the same method.     04/26/2023 04:35 PM 36 0 - 13 ng/L Final     Comment:     .  Less than 99th percentile of normal range cutoff-  Female and children under 18 years old <14 ng/L; Male <21 ng/L: Negative  Repeat testing should be performed if clinically indicated.   .  Female and children under 18 years old 14-50 ng/L; Male 21-50 ng/L:  Consistent with possible cardiac damage and possible increased clinical   risk. Serial measurements may help to assess extent of myocardial damage.   .  >50 ng/L: Consistent with cardiac damage, increased clinical risk and  myocardial infarction. Serial measurements may help assess extent of   myocardial damage.   .   NOTE: Children less than 1 year old may have higher baseline troponin   levels and results should be interpreted in conjunction with the overall   clinical context.   .  NOTE: Troponin I testing is performed using a different   testing methodology at [facility] than at other   [facility] hospitals. Direct result comparisons should only   be made within the same method.        BNP   Date/Time Value Ref Range Status   02/01/2024 08:41 PM 1,786 0 - 99 pg/mL Final   07/21/2023 04:50 AM 1,496 0 - 99 pg/mL Final     Comment:     .  <100 pg/mL - Heart failure unlikely  100-299 pg/mL - Intermediate probability of acute heart  .               failure exacerbation. Correlate with clinical  .               context and patient history.    >=300 pg/mL - Heart Failure likely. Correlate with clinical  .               context and patient history.  BNP testing is performed using different testing   methodology at [facility] than at other   system [facility]. Direct result comparisons should   only be made within the same method.     07/20/2023 05:05 AM 1,806 0 - 99 pg/mL Final     Comment:     .  <100 pg/mL - Heart failure unlikely  100-299 pg/mL - Intermediate probability of acute heart  .               failure exacerbation. Correlate with clinical  .               context and patient history.    >=300 pg/mL - Heart Failure likely. Correlate with clinical  .               context and patient history.  BNP testing is performed using different testing   methodology at [facility] than at other   [facility] hospitals. Direct result comparisons should   only be made within the same method.       HGBA1C   Date/Time Value Ref Range Status   10/18/2023 01:15 PM 9.4 see below % Final   04/27/2023 06:24 AM 9.9 % Final     Comment:          Diagnosis of Diabetes-Adults   Non-Diabetic: < or = 5.6%   Increased risk for developing diabetes: 5.7-6.4%   Diagnostic of diabetes: > or = 6.5%  .       Monitoring of Diabetes                Age (y)     Therapeutic Goal (%)   Adults:          >18           <7.0   Pediatrics:    13-18           <7.5                   7-12           <8.0                   0- 6            7.5-8.5   American Diabetes Association. Diabetes Care 33(S1), Jan 2010.     02/23/2023 11:13 AM 9.1 4.0 - 6.0 % Final     Comment:     Hemoglobin A1C levels are related to mean blood  glucose during the   preceding 2-3 months. The relationship table below may be used as a   general guide. Each 1% increase in HGB A1C is a reflection of an   increase in mean glucose of approximately 30 mg/dl.   Reference: Diabetes Care, volume 29, supplement 1 Jan. 2006                        HGB A1C ................. Approx. Mean Glucose   _______________________________________________   6%   ...............................  120 mg/dl   7%   ...............................  150 mg/dl   8%   ...............................  180 mg/dl   9%   ...............................  210 mg/dl   10%  ...............................  240 mg/dl  Performed at [address] [address]       LDLCALC   Date/Time Value Ref Range Status   02/23/2023 11:13 AM 77 65 - 130 MG/DL Final   04/07/2021 06:26  65 - 130 MG/DL Final   07/15/2020 09:12 AM 78 65 - 130 MG/DL Final     VLDL   Date/Time Value Ref Range Status   04/27/2023 06:24 AM 20 0 - 40 mg/dL Final      Last I/O:  I/O last 3 completed shifts:  In: 240 (2.5 mL/kg) [P.O.:240]  Out: 2800 (28.9 mL/kg) [Urine:2800 (0.8 mL/kg/hr)]  Weight: 96.8 kg     Past Cardiology Tests (Last 3 Years):  EKG:  ECG 12 lead 02/01/2024    Echo:  Transthoracic Echo (TTE) Complete 02/05/2024    Ejection Fractions:  EF   Date/Time Value Ref Range Status   02/05/2024 01:52 PM 63 %      Cath:  Cardiac catheterization - non-coronary 02/07/2024    Stress Test:  No results found for this or any previous visit from the past 1095 days.    Cardiac Imaging:  No results found for this or any previous visit from the past 1095 days.      Inpatient Medications:  Scheduled medications   Medication Dose Route Frequency    [Held by provider] amiodarone  200 mg oral Daily with breakfast    amLODIPine  10 mg oral Daily    aspirin  81 mg oral Daily    atorvastatin  40 mg oral Daily    clopidogrel  75 mg oral Daily    dextrose  25 g intravenous Once    ferrous gluconate  324 mg oral Daily with  breakfast    furosemide  80 mg intravenous q12h    heparin (porcine)  5,000 Units subcutaneous q8h    [Held by provider] insulin glargine  16 Units subcutaneous Nightly    insulin lispro  0-15 Units subcutaneous TID with meals    ipratropium-albuteroL  3 mL nebulization TID    levothyroxine  200 mcg oral Daily before breakfast    levothyroxine  50 mcg oral Daily before breakfast    [Held by provider] metoprolol succinate XL  50 mg oral BID    pantoprazole  40 mg oral BID    [Held by provider] potassium chloride CR  20 mEq oral Daily    [Held by provider] SITagliptin phosphate  25 mg oral Daily    venlafaxine XR  75 mg oral Daily     PRN medications   Medication    acetaminophen    dextrose 10 % in water (D10W)    dextrose    glucagon    HYDROmorphone    ipratropium-albuteroL    melatonin    oxygen    oxygen    oxygen     Continuous Medications   Medication Dose Last Rate       Physical Exam:  Constitutional: Well developed, awake/alert/oriented x3, no distress, alert and cooperative  Eyes: PERRL, EOMI, clear sclera  ENMT: mucous membranes moist, no apparent injury, no lesions seen  Head/Neck: Neck supple, no apparent injury, thyroid without mass or tenderness, No JVD, trachea midline, no bruits  Respiratory/Thorax: Patent airways, CTAB, normal breath sounds with good chest expansion, thorax symmetric  Cardiovascular: Regular, rate and rhythm, no murmurs, 2+ equal pulses of the extremities, normal S 1and S 2  Gastrointestinal: Nondistended, soft, non-tender, no rebound tenderness or guarding, no masses palpable, no organomegaly, +BS, no bruits  Musculoskeletal: ROM intact, no joint swelling, normal strength  Extremities: normal extremities, no cyanosis edema, contusions or wounds, no clubbing  Neurological: alert and oriented x3, intact senses, motor, response and reflexes, normal strength  Lymphatic: No significant lymphadenopathy  Psychological: Appropriate mood and behavior  Skin: Warm and dry, no lesions, no  padmaja      Assessment/Plan   Principal Problem:    CHF (congestive heart failure), NYHA class I, acute on chronic, combined (CMS/[facility])     Echocardiogram from August 2023 (post watchman) reviewed: LVEF 55%, small to moderate pericardial effusions        1. Acute on chronic hypoxic/hypercapnic respiratory failure 2/2 acute on chronic diastolic CHF and pneumonia, COPD exac, Pleural effusion  -CXR showed large right pleural effusion  -CTA of the chest showed large right pleural effusion with complete collapse of the right lower lung, pneumonia, a small pericardial effusion, and soft tissue edema at the chest and abd wall.   -antibiotics  -bronchodilators  -Steroids  -oxygen support/Bipap  -sputum culture  -blood cultures  -Still with ignificant leg to abd swelling and shortness of breath  -BNP 1786  -Strict I & Os  -Daily weights  -2gm na diet  -Repeat echo as above  -Unable to start SGLT2 inhibitors with current GFR  -Pulmonary following, s/p thoracentesis 2.3L  -s/p RHC today showed MEAN PA 30 MILD PULMONARY HTN MEAN RA 9 MM HG MEAN PCWP 23 MM HG CARDIAC OUTPUT 8.03 CI 3.79 NO SHUNTS   -Per nephro notes->increase IV lasix to 80mg BID  -Monitor on tele     2. Elevated troponins-non MI elevation  -Denies chest pain  -Does have shortness of breath-worsening for weeks  -Had a LHC planned 2 weeks ago->unable to lay flat  -Cont asa, plavix, statin, metoprolol  -Unable to do LHC due to Creatinine 3.6  -Monitor on tele     3. Paroxysmal atrial fibrillation with RVR  -s/p Watchman in Aug 2023  -Currently SB  -Cont amiodarone and metoprolol for rate control  -Monitor on tele     4. Hypertension  -stable  -2gm na diet  -cont meds  -monitor     5. Anemia  -Appear stable     6. Acute on Chronic kidney disease  -s/p nephrectomy  -Renal consulted, note reviewed increase lasix to 80mg iv BID  - Per Renal will possibly need HD  -Renal US negative  -Monitor closely on Lasix IV     7. Diabetes  -ISS  -Accuchecks     8.  Hypothyroidism  -Cont synthroid     9. Pericardial effusion  -Mild to moderate on echo  -Cont diuresis  Peripheral IV 02/01/24 22 G Left;Posterior Hand (Active)   Site Assessment Clean;Dry;Intact 02/11/24 0900   Dressing Status Clean;Dry 02/11/24 0900   Number of days: 10       Peripheral IV 02/02/24 20 G Left Antecubital (Active)   Site Assessment Clean;Dry;Intact 02/11/24 0900   Dressing Status Clean;Dry 02/11/24 0900   Number of days: 9       Urethral Catheter Double-lumen 16 Fr. (Active)   Reason for Continuing Urinary Catheterization acute urinary retention 02/11/24 1300   Number of days: 9       Code Status:  Full Code    I spent 15 minutes in the professional and overall care of this patient.        Nicholas Antonio MD

## 2024-02-11 NOTE — CARE PLAN
The patient's goals for the shift include      The clinical goals for the shift include remain hds    Over the shift, the patient did not make progress toward the following goals. Barriers to progression include patient has hds all night. Recommendations to address these barriers include continue with pan of care.

## 2024-02-11 NOTE — PROGRESS NOTES
Daphne Morris is a 68 y.o. female on day 9 of admission presenting with CHF (congestive heart failure), NYHA class I, acute on chronic, combined (CMS/HCC).      Subjective   No appetite. Wants to go home and see her cats. Very discouraged.        Objective     Last Recorded Vitals  BP (!) 136/94 (BP Location: Right arm, Patient Position: Lying)   Pulse (!) 45   Temp 35.6 °C (96 °F) (Skin)   Resp 19   Wt 96.8 kg (213 lb 6.5 oz)   SpO2 98%   Intake/Output last 3 Shifts:    Intake/Output Summary (Last 24 hours) at 2/11/2024 1219  Last data filed at 2/11/2024 0930  Gross per 24 hour   Intake 120 ml   Output 2400 ml   Net -2280 ml       Admission Weight  Weight: 99.8 kg (220 lb 0.3 oz) (02/02/24 0404)    Daily Weight  02/11/24 : 96.8 kg (213 lb 6.5 oz)    Image Results  XR chest 1 view  Narrative: Interpreted By:  Sukumar Daugherty,   STUDY:  XR CHEST 1 VIEW;  2/9/2024 1:13 pm      INDICATION:  Signs/Symptoms:fever.      COMPARISON:  02/02/2024      ACCESSION NUMBER(S):  [id number]      ORDERING CLINICIAN:  TONIA MONTANA      FINDINGS:  Significantly increased large right pleural effusion with overlying  atelectasis. Left lung grossly clear. Cardiomediastinal silhouette  unchanged. Aortic atherosclerosis. Pulmonary vascular congestion.      Impression: Large right pleural effusion, significantly increased compared to 1  week ago.          Signed by: Sukumar Daugherty 2/9/2024 3:56 PM  Dictation workstation:   [id number]    Results for orders placed or performed during the hospital encounter of 02/02/24 (from the past 24 hour(s))   POCT GLUCOSE   Result Value Ref Range    POCT Glucose 92 74 - 99 mg/dL   POCT GLUCOSE   Result Value Ref Range    POCT Glucose 132 (H) 74 - 99 mg/dL   CBC and Auto Differential   Result Value Ref Range    WBC 8.1 4.4 - 11.3 x10*3/uL    nRBC 0.0 0.0 - 0.0 /100 WBCs    RBC 2.87 (L) 4.00 - 5.20 x10*6/uL    Hemoglobin 8.5 (L) 12.0 - 16.0 g/dL    Hematocrit 28.9 (L) 36.0 - 46.0 %     (H) 80 -  "100 fL    MCH 29.6 26.0 - 34.0 pg    MCHC 29.4 (L) 32.0 - 36.0 g/dL    RDW 14.0 11.5 - 14.5 %    Platelets 240 150 - 450 x10*3/uL    Neutrophils % 69.6 40.0 - 80.0 %    Immature Granulocytes %, Automated 0.4 0.0 - 0.9 %    Lymphocytes % 15.9 13.0 - 44.0 %    Monocytes % 10.4 2.0 - 10.0 %    Eosinophils % 3.6 0.0 - 6.0 %    Basophils % 0.1 0.0 - 2.0 %    Neutrophils Absolute 5.60 1.20 - 7.70 x10*3/uL    Immature Granulocytes Absolute, Automated 0.03 0.00 - 0.70 x10*3/uL    Lymphocytes Absolute 1.28 1.20 - 4.80 x10*3/uL    Monocytes Absolute 0.84 0.10 - 1.00 x10*3/uL    Eosinophils Absolute 0.29 0.00 - 0.70 x10*3/uL    Basophils Absolute 0.01 0.00 - 0.10 x10*3/uL   Basic Metabolic Panel   Result Value Ref Range    Glucose 46 (LL) 74 - 99 mg/dL    Sodium 143 136 - 145 mmol/L    Potassium 3.5 3.5 - 5.3 mmol/L    Chloride 105 98 - 107 mmol/L    Bicarbonate 30 21 - 32 mmol/L    Anion Gap 12 10 - 20 mmol/L    Urea Nitrogen 75 (H) 6 - 23 mg/dL    Creatinine 3.27 (H) 0.50 - 1.05 mg/dL    eGFR 15 (L) >60 mL/min/1.73m*2    Calcium 7.3 (L) 8.6 - 10.3 mg/dL   POCT GLUCOSE   Result Value Ref Range    POCT Glucose 24 (L) 74 - 99 mg/dL   POCT GLUCOSE   Result Value Ref Range    POCT Glucose 26 (L) 74 - 99 mg/dL   POCT GLUCOSE   Result Value Ref Range    POCT Glucose 112 (H) 74 - 99 mg/dL      No current facility-administered medications on file prior to encounter.     Current Outpatient Medications on File Prior to Encounter   Medication Sig Dispense Refill    albuterol 90 mcg/actuation inhaler Inhale 2 puffs every 4 hours if needed.      amiodarone (Pacerone) 200 mg tablet Take 1 tablet (200 mg) by mouth once daily with a meal.      amLODIPine (Norvasc) 5 mg tablet Take 1 tablet (5 mg) by mouth once daily.      aspirin 81 mg EC tablet Take 1 tablet (81 mg) by mouth once daily.      atorvastatin (Lipitor) 40 mg tablet Take 1 tablet (40 mg) by mouth once daily.      BD Calista 2nd Gen Pen Needle 32 gauge x 5/32\" needle USE " SUBCUTANEOUSLY AS DIRECTED TWICE DAILY      clopidogrel (Plavix) 75 mg tablet Take 1 tablet (75 mg) by mouth once daily.      ferrous gluconate 324 (38 Fe) MG tablet Take 1 tablet (324 mg) by mouth once daily with breakfast. 30 tablet 3    FreeStyle Shakir 14 Day Sensor kit USE AS DIRECTED TO CHECK SUGARS 3 TO 4 TIMES DAILY      FreeStyle Lite Strips strip twice a day.      HumaLOG KwikPen Insulin 100 unit/mL injection up to 15 units Subcutaneous three times a day per sliding scale      ipratropium-albuteroL (Duo-Neb) 0.5-2.5 mg/3 mL nebulizer solution Take 3 mL by nebulization every 6 hours if needed.      Lantus Solostar U-100 Insulin 100 unit/mL (3 mL) pen Inject 14 Units under the skin once daily at bedtime.      levothyroxine (Synthroid, Levoxyl) 200 mcg tablet Take 1 tablet (200 mcg) by mouth once daily in the morning. Take before meals. 90 tablet 3    levothyroxine (Synthroid, Levoxyl) 50 mcg tablet Take 1 tablet (50 mcg) by mouth once daily in the morning. Take before meals.      metoprolol succinate XL (Toprol-XL) 50 mg 24 hr tablet TAKE ONE TABLET BY MOUTH TWO TIMES A  tablet 1    oxygen (O2) gas therapy 2 l/min nasal cannula      pantoprazole (ProtoNix) 40 mg EC tablet Take 1 tablet (40 mg) by mouth 2 times a day. 60 tablet 6    potassium chloride CR 20 mEq ER tablet TAKE ONE TABLET BY MOUTH EVERY DAY WITH FOOD 90 tablet 1    SITagliptin phosphate (Januvia) 100 mg tablet Take 1 tablet (100 mg) by mouth once daily.      Soaanz 60 mg tablet Take 1 tablet by mouth once daily. 30 tablet 3    venlafaxine XR (Effexor-XR) 75 mg 24 hr capsule Take 1 capsule (75 mg) by mouth once daily.          Physical Exam  Constitutional:       Comments: Groggy, but did wake up and speak appropriately   HENT:      Head: Normocephalic and atraumatic.      Nose: Nose normal.      Mouth/Throat:      Mouth: Mucous membranes are dry.   Eyes:      Extraocular Movements: Extraocular movements intact.      Pupils: Pupils are  equal, round, and reactive to light.   Neck:      Comments: Jvd is present  Cardiovascular:      Pulses: Normal pulses.      Comments: Irregular, bradycardic  Pulmonary:      Comments: Bilateral scattered crackles. No wheezing.   Abdominal:      General: Bowel sounds are normal.      Palpations: Abdomen is soft.   Genitourinary:     Comments: Fay intact  Musculoskeletal:      Comments: Stasis dermatitis BLE. No edema   Skin:     General: Skin is dry.      Comments: Many ecchymoses over extremities   Neurological:      General: No focal deficit present.      Motor: Weakness present.   Psychiatric:         Mood and Affect: Mood normal.         Behavior: Behavior normal.         Relevant Results       Assessment/Plan      Principal Problem:    CHF (congestive heart failure), NYHA class I, acute on chronic, combined (CMS/HCC)    Hypoglycemia ,. She is not eating much at all, almost refusing. Holding lantus now, will need to keep closer eye on for next 24 hours. Adding nutritional supplement to meals, she did agree to try that  PAF. Heart rate is in 40's today, maintaining pressure. Will hold amio and metop for now, and continue to monitor  CKD, progressive. She is negative almost 6 liters now. 2280 over last 24 hours, with stable creat. May be able to not start dialysis, defer to nephro   Anemia, chronic disease. Monitor  Fluid overload, /chf. As above.  Hypothyroid, stable  Deconditioning. She is not very amenable to snf at this time              Alejandra Marshall MD

## 2024-02-12 LAB
ALBUMIN SERPL BCP-MCNC: 2.3 G/DL (ref 3.4–5)
ALP SERPL-CCNC: 76 U/L (ref 33–136)
ALT SERPL W P-5'-P-CCNC: 8 U/L (ref 7–45)
ANION GAP SERPL CALC-SCNC: 14 MMOL/L (ref 10–20)
AST SERPL W P-5'-P-CCNC: 10 U/L (ref 9–39)
BILIRUB SERPL-MCNC: 0.3 MG/DL (ref 0–1.2)
BUN SERPL-MCNC: 72 MG/DL (ref 6–23)
CALCIUM SERPL-MCNC: 7.3 MG/DL (ref 8.6–10.3)
CHLORIDE SERPL-SCNC: 101 MMOL/L (ref 98–107)
CO2 SERPL-SCNC: 29 MMOL/L (ref 21–32)
CREAT SERPL-MCNC: 3.03 MG/DL (ref 0.5–1.05)
EGFRCR SERPLBLD CKD-EPI 2021: 16 ML/MIN/1.73M*2
GLUCOSE BLD MANUAL STRIP-MCNC: 200 MG/DL (ref 74–99)
GLUCOSE BLD MANUAL STRIP-MCNC: 207 MG/DL (ref 74–99)
GLUCOSE BLD MANUAL STRIP-MCNC: 220 MG/DL (ref 74–99)
GLUCOSE BLD MANUAL STRIP-MCNC: 231 MG/DL (ref 74–99)
GLUCOSE SERPL-MCNC: 244 MG/DL (ref 74–99)
POTASSIUM SERPL-SCNC: 3.7 MMOL/L (ref 3.5–5.3)
PROT SERPL-MCNC: 4.9 G/DL (ref 6.4–8.2)
SODIUM SERPL-SCNC: 140 MMOL/L (ref 136–145)

## 2024-02-12 PROCEDURE — 2500000004 HC RX 250 GENERAL PHARMACY W/ HCPCS (ALT 636 FOR OP/ED)

## 2024-02-12 PROCEDURE — 97530 THERAPEUTIC ACTIVITIES: CPT | Mod: GP

## 2024-02-12 PROCEDURE — 2500000001 HC RX 250 WO HCPCS SELF ADMINISTERED DRUGS (ALT 637 FOR MEDICARE OP): Performed by: INTERNAL MEDICINE

## 2024-02-12 PROCEDURE — 97530 THERAPEUTIC ACTIVITIES: CPT | Mod: GO

## 2024-02-12 PROCEDURE — 2500000004 HC RX 250 GENERAL PHARMACY W/ HCPCS (ALT 636 FOR OP/ED): Performed by: INTERNAL MEDICINE

## 2024-02-12 PROCEDURE — 2500000001 HC RX 250 WO HCPCS SELF ADMINISTERED DRUGS (ALT 637 FOR MEDICARE OP)

## 2024-02-12 PROCEDURE — 82947 ASSAY GLUCOSE BLOOD QUANT: CPT

## 2024-02-12 PROCEDURE — 97116 GAIT TRAINING THERAPY: CPT | Mod: GP

## 2024-02-12 PROCEDURE — 2500000002 HC RX 250 W HCPCS SELF ADMINISTERED DRUGS (ALT 637 FOR MEDICARE OP, ALT 636 FOR OP/ED): Performed by: INTERNAL MEDICINE

## 2024-02-12 PROCEDURE — 99233 SBSQ HOSP IP/OBS HIGH 50: CPT | Performed by: INTERNAL MEDICINE

## 2024-02-12 PROCEDURE — 94640 AIRWAY INHALATION TREATMENT: CPT | Mod: MUE

## 2024-02-12 PROCEDURE — 80053 COMPREHEN METABOLIC PANEL: CPT | Performed by: INTERNAL MEDICINE

## 2024-02-12 PROCEDURE — 2060000001 HC INTERMEDIATE ICU ROOM DAILY

## 2024-02-12 PROCEDURE — 2500000001 HC RX 250 WO HCPCS SELF ADMINISTERED DRUGS (ALT 637 FOR MEDICARE OP): Performed by: NURSE PRACTITIONER

## 2024-02-12 PROCEDURE — 97535 SELF CARE MNGMENT TRAINING: CPT | Mod: GO

## 2024-02-12 PROCEDURE — 36415 COLL VENOUS BLD VENIPUNCTURE: CPT | Performed by: INTERNAL MEDICINE

## 2024-02-12 PROCEDURE — 2500000002 HC RX 250 W HCPCS SELF ADMINISTERED DRUGS (ALT 637 FOR MEDICARE OP, ALT 636 FOR OP/ED)

## 2024-02-12 PROCEDURE — 2500000005 HC RX 250 GENERAL PHARMACY W/O HCPCS: Performed by: FAMILY MEDICINE

## 2024-02-12 RX ORDER — DEXTROSE MONOHYDRATE 100 MG/ML
0.3 INJECTION, SOLUTION INTRAVENOUS ONCE AS NEEDED
Status: DISCONTINUED | OUTPATIENT
Start: 2024-02-12 | End: 2024-02-15 | Stop reason: HOSPADM

## 2024-02-12 RX ORDER — INSULIN GLARGINE 100 [IU]/ML
8 INJECTION, SOLUTION SUBCUTANEOUS NIGHTLY
Status: DISCONTINUED | OUTPATIENT
Start: 2024-02-12 | End: 2024-02-15 | Stop reason: HOSPADM

## 2024-02-12 RX ORDER — TORSEMIDE 60 MG/1
1 TABLET, FILM COATED ORAL DAILY
Qty: 30 TABLET | Refills: 3 | Status: SHIPPED | OUTPATIENT
Start: 2024-02-12 | End: 2024-03-05 | Stop reason: HOSPADM

## 2024-02-12 RX ORDER — DEXTROSE 50 % IN WATER (D50W) INTRAVENOUS SYRINGE
25
Status: DISCONTINUED | OUTPATIENT
Start: 2024-02-12 | End: 2024-02-15 | Stop reason: HOSPADM

## 2024-02-12 RX ADMIN — INSULIN GLARGINE 8 UNITS: 100 INJECTION, SOLUTION SUBCUTANEOUS at 20:20

## 2024-02-12 RX ADMIN — LACTULOSE 20 G: 20 SOLUTION ORAL at 05:22

## 2024-02-12 RX ADMIN — INSULIN LISPRO 6 UNITS: 100 INJECTION, SOLUTION INTRAVENOUS; SUBCUTANEOUS at 09:31

## 2024-02-12 RX ADMIN — INSULIN LISPRO 6 UNITS: 100 INJECTION, SOLUTION INTRAVENOUS; SUBCUTANEOUS at 17:53

## 2024-02-12 RX ADMIN — ACETAMINOPHEN 650 MG: 325 TABLET ORAL at 20:34

## 2024-02-12 RX ADMIN — PANTOPRAZOLE SODIUM 40 MG: 40 TABLET, DELAYED RELEASE ORAL at 09:19

## 2024-02-12 RX ADMIN — INSULIN LISPRO 3 UNITS: 100 INJECTION, SOLUTION INTRAVENOUS; SUBCUTANEOUS at 12:42

## 2024-02-12 RX ADMIN — FUROSEMIDE 80 MG: 10 INJECTION, SOLUTION INTRAMUSCULAR; INTRAVENOUS at 20:20

## 2024-02-12 RX ADMIN — HEPARIN SODIUM 5000 UNITS: 5000 INJECTION INTRAVENOUS; SUBCUTANEOUS at 20:20

## 2024-02-12 RX ADMIN — LEVOTHYROXINE SODIUM 200 MCG: 100 TABLET ORAL at 05:22

## 2024-02-12 RX ADMIN — AMLODIPINE BESYLATE 10 MG: 5 TABLET ORAL at 09:20

## 2024-02-12 RX ADMIN — VENLAFAXINE HYDROCHLORIDE 75 MG: 75 CAPSULE, EXTENDED RELEASE ORAL at 09:19

## 2024-02-12 RX ADMIN — PANTOPRAZOLE SODIUM 40 MG: 40 TABLET, DELAYED RELEASE ORAL at 20:20

## 2024-02-12 RX ADMIN — ASPIRIN 81 MG: 81 TABLET, COATED ORAL at 09:19

## 2024-02-12 RX ADMIN — FERROUS GLUCONATE 324 MG: 324 TABLET ORAL at 09:20

## 2024-02-12 RX ADMIN — IPRATROPIUM BROMIDE AND ALBUTEROL SULFATE 3 ML: 2.5; .5 SOLUTION RESPIRATORY (INHALATION) at 08:32

## 2024-02-12 RX ADMIN — FUROSEMIDE 80 MG: 10 INJECTION, SOLUTION INTRAMUSCULAR; INTRAVENOUS at 09:20

## 2024-02-12 RX ADMIN — CLOPIDOGREL 75 MG: 75 TABLET ORAL at 09:20

## 2024-02-12 RX ADMIN — HEPARIN SODIUM 5000 UNITS: 5000 INJECTION INTRAVENOUS; SUBCUTANEOUS at 05:22

## 2024-02-12 RX ADMIN — IPRATROPIUM BROMIDE AND ALBUTEROL SULFATE 3 ML: 2.5; .5 SOLUTION RESPIRATORY (INHALATION) at 19:43

## 2024-02-12 RX ADMIN — LEVOTHYROXINE SODIUM 50 MCG: 50 TABLET ORAL at 05:23

## 2024-02-12 RX ADMIN — ATORVASTATIN CALCIUM 40 MG: 40 TABLET, FILM COATED ORAL at 09:20

## 2024-02-12 RX ADMIN — IPRATROPIUM BROMIDE AND ALBUTEROL SULFATE 3 ML: 2.5; .5 SOLUTION RESPIRATORY (INHALATION) at 14:58

## 2024-02-12 RX ADMIN — HEPARIN SODIUM 5000 UNITS: 5000 INJECTION INTRAVENOUS; SUBCUTANEOUS at 12:42

## 2024-02-12 RX ADMIN — Medication 2 L/MIN: at 08:32

## 2024-02-12 ASSESSMENT — COGNITIVE AND FUNCTIONAL STATUS - GENERAL
DAILY ACTIVITIY SCORE: 15
DRESSING REGULAR UPPER BODY CLOTHING: A LITTLE
MOVING FROM LYING ON BACK TO SITTING ON SIDE OF FLAT BED WITH BEDRAILS: A LOT
WALKING IN HOSPITAL ROOM: A LOT
DRESSING REGULAR UPPER BODY CLOTHING: A LITTLE
STANDING UP FROM CHAIR USING ARMS: A LOT
TOILETING: A LOT
TURNING FROM BACK TO SIDE WHILE IN FLAT BAD: A LOT
PERSONAL GROOMING: A LITTLE
CLIMB 3 TO 5 STEPS WITH RAILING: A LOT
CLIMB 3 TO 5 STEPS WITH RAILING: TOTAL
MOBILITY SCORE: 11
PERSONAL GROOMING: A LITTLE
MOVING TO AND FROM BED TO CHAIR: A LOT
DAILY ACTIVITIY SCORE: 16
MOVING TO AND FROM BED TO CHAIR: A LOT
WALKING IN HOSPITAL ROOM: A LOT
TOILETING: A LOT
MOBILITY SCORE: 12
STANDING UP FROM CHAIR USING ARMS: A LOT
DRESSING REGULAR LOWER BODY CLOTHING: A LOT
EATING MEALS: A LITTLE
DRESSING REGULAR LOWER BODY CLOTHING: A LOT
TURNING FROM BACK TO SIDE WHILE IN FLAT BAD: A LOT
HELP NEEDED FOR BATHING: A LITTLE
MOVING FROM LYING ON BACK TO SITTING ON SIDE OF FLAT BED WITH BEDRAILS: A LOT
HELP NEEDED FOR BATHING: A LOT
EATING MEALS: A LITTLE

## 2024-02-12 ASSESSMENT — PAIN - FUNCTIONAL ASSESSMENT
PAIN_FUNCTIONAL_ASSESSMENT: 0-10

## 2024-02-12 ASSESSMENT — PAIN DESCRIPTION - LOCATION: LOCATION: HEAD

## 2024-02-12 ASSESSMENT — PAIN SCALES - GENERAL
PAINLEVEL_OUTOF10: 0 - NO PAIN
PAINLEVEL_OUTOF10: 0 - NO PAIN
PAINLEVEL_OUTOF10: 9
PAINLEVEL_OUTOF10: 0 - NO PAIN

## 2024-02-12 ASSESSMENT — ACTIVITIES OF DAILY LIVING (ADL)
HOME_MANAGEMENT_TIME_ENTRY: 15
LACK_OF_TRANSPORTATION: NO

## 2024-02-12 NOTE — CARE PLAN
The patient's goals for the shift include  working with PT    The clinical goals for the shift include Pt. will work with therapy so she can be discharged home.

## 2024-02-12 NOTE — PROGRESS NOTES
"Occupational Therapy    OT Treatment    Patient Name: Daphne Morris  MRN: [id number]  Today's Date: 2/12/2024  Time Calculation  Start Time: 0922  Stop Time: 0957  Time Calculation (min): 35 min         Assessment:  OT Assessment: Pt continues to demonstreate decreased ability to complete BADL and basic mobility at her reported PLOF. Would benefit from continued skilled OT at moderate intensity.  End of Session Communication: Bedside nurse, PCT/NA/CTA  End of Session Patient Position: Bed, 3 rail up, Alarm on  OT Assessment Results: Decreased ADL status, Decreased endurance, Decreased functional mobility  Prognosis: Good  Evaluation/Treatment Tolerance: Patient limited by fatigue  Medical Staff Made Aware: Yes  Barriers to Participation: Comorbidities  Plan:  Treatment Interventions: ADL retraining, Functional transfer training, Compensatory technique education  OT Frequency: 3 times per week  OT Discharge Recommendations: Moderate intensity level of continued care  Equipment Recommended upon Discharge: Wheeled walker  OT Recommended Transfer Status: Assist of 1  OT - OK to Discharge: Yes (per OT POC)  Treatment Interventions: ADL retraining, Functional transfer training, Compensatory technique education    Subjective   Previous Visit Info:  OT Last Visit  OT Received On: 02/12/24    General:  General  Prior to Session Communication: Bedside nurse  Patient Position Received:  (Sitting EOB, RN administering morning meds. Pt requesting to return to bed following treatment)  General Comment: 3L O2, vega, ROCAEL LE ace wraps. Activity this session limited to EOB activity, pt reports continued fatigue and unable to complete sit<>stand after multiple attempts  Precautions:  Medical Precautions: Fall precautions    Pain:  Pain Assessment  Pain Assessment: 0-10  Pain Score:  (B LE \"tenderness\" with movement and touch. Not rted by patient, positioned for comfort at end of session)    Objective    Cognition:  Cognition  Insight:  " (Pt demonstrates poor insight into her current condition and physical/ functional impairments)    Activities of Daily Living: LE Dressing  LE Dressing: Yes  Sock Level of Assistance: Moderate assistance  LE Dressing Where Assessed: Edge of bed  LE Dressing Comments: increased time and pt uses modified technique of attempting to bring LE up to bed.    Toileting  Toileting Level of Assistance: Dependent  Where Assessed: Bed level  Toileting Comments: incontinent of BM, requires assistant for yue-care    Bed Mobility/Transfers: Bed Mobility  Bed Mobility: Yes  Bed Mobility 1  Bed Mobility 1: Sitting to supine  Level of Assistance 1: Moderate assistance  Bed Mobility Comments 1: x2 assist  Bed Mobility 2  Bed Mobility  2: Rolling left, Rolling right  Level of Assistance 2: Moderate assistance  Bed Mobility Comments 2: SBA to maintain sidelying    Transfer 1  Technique 1: Sit to stand, Stand to sit  Transfer Level of Assistance 1: Maximum assistance  Trials/Comments 1: unable to achieve sit<>stand after x4 attempts    Sitting Balance:  Dynamic Sitting Balance  Dynamic Sitting-Balance Support: Bilateral upper extremity supported, Feet supported  Dynamic Sitting-Balance:  (laterla scooting along side of bed)  Dynamic Sitting-Comments: unable to demonstrate successful weight shift  Outcome Measures:[facility] Daily Activity  Putting on and taking off regular lower body clothing: A lot  Bathing (including washing, rinsing, drying): A lot  Putting on and taking off regular upper body clothing: A little  Toileting, which includes using toilet, bedpan or urinal: A lot  Taking care of personal grooming such as brushing teeth: A little  Eating Meals: A little  Daily Activity - Total Score: 15        Education Documentation  Body Mechanics, taught by Hyacinth Wade OT at 2/12/2024 11:52 AM.  Learner: Patient  Readiness: Acceptance  Method: Explanation, Demonstration  Response: Needs Reinforcement  Comment: Pt edu on safe transfer  techniques and proper positioning to increase success with functional transfers, will require further edu and training.    Goals:  Encounter Problems       Encounter Problems (Active)             OT Goals       Pt will tolerate 10min stand during functional task completion with no more than 1 rest break in order to increase endurance for functional task completion.  (Progressing)       Start:  02/03/24    Expected End:  02/09/24            Pt will increase endurance to tolerate 15min of OOB activity with no more than 1 rest break in order to increase ability to engage in ADL completion.  (Progressing)       Start:  02/03/24    Expected End:  02/09/24            Pt will demo increased functional mobility to tolerate tasks necessary to complete ADL routine.  (Progressing)       Start:  02/03/24    Expected End:  02/09/24            Pt will demo ADL routine and meaningful daily activities using modifications as needed  (Progressing)       Start:  02/03/24    Expected End:  02/09/24            Pt will demo and/or verbalize 2-3 energy conservation techniques to incorporate into functional mobility or ADL to improve performance and increase independence.. (Progressing)       Start:  02/03/24    Expected End:  02/09/24

## 2024-02-12 NOTE — CARE PLAN
The patient's goals for the shift include  working with therapy    The clinical goals for the shift include patient will have bowel movement

## 2024-02-12 NOTE — CARE PLAN
The patient's goals for the shift include      The clinical goals for the shift include patient will have bowel movement    Over the shift, the patient did not make progress toward the following goals. Barriers to progression include patient complained of constipation and was given medication. Recommendations to address these barriers include continue with plan of care.

## 2024-02-12 NOTE — PROGRESS NOTES
Daphne Morris is a 68 y.o. female on day 10 of admission presenting with CHF (congestive heart failure), NYHA class I, acute on chronic, combined (CMS/HCC).      Subjective   Anxious to go home. Trying to eat a little better, but not much appetite       Objective     Last Recorded Vitals  /70 (BP Location: Left arm, Patient Position: Lying)   Pulse 69   Temp 37.1 °C (98.8 °F) (Temporal)   Resp 17   Wt 99.8 kg (220 lb)   SpO2 100%   Intake/Output last 3 Shifts:    Intake/Output Summary (Last 24 hours) at 2/12/2024 1146  Last data filed at 2/12/2024 0912  Gross per 24 hour   Intake 600 ml   Output 1350 ml   Net -750 ml       Admission Weight  Weight: 99.8 kg (220 lb 0.3 oz) (02/02/24 0404)    Daily Weight  02/12/24 : 99.8 kg (220 lb)    Image Results  XR chest 1 view  Narrative: Interpreted By:  Sukumar Daugherty,   STUDY:  XR CHEST 1 VIEW;  2/9/2024 1:13 pm      INDICATION:  Signs/Symptoms:fever.      COMPARISON:  02/02/2024      ACCESSION NUMBER(S):  [id number]      ORDERING CLINICIAN:  TONIA MONTANA      FINDINGS:  Significantly increased large right pleural effusion with overlying  atelectasis. Left lung grossly clear. Cardiomediastinal silhouette  unchanged. Aortic atherosclerosis. Pulmonary vascular congestion.      Impression: Large right pleural effusion, significantly increased compared to 1  week ago.          Signed by: Sukumar Daugherty 2/9/2024 3:56 PM  Dictation workstation:   [id number]    No current facility-administered medications on file prior to encounter.     Current Outpatient Medications on File Prior to Encounter   Medication Sig Dispense Refill    albuterol 90 mcg/actuation inhaler Inhale 2 puffs every 4 hours if needed.      amiodarone (Pacerone) 200 mg tablet Take 1 tablet (200 mg) by mouth once daily with a meal.      amLODIPine (Norvasc) 5 mg tablet Take 1 tablet (5 mg) by mouth once daily.      aspirin 81 mg EC tablet Take 1 tablet (81 mg) by mouth once daily.      atorvastatin  "(Lipitor) 40 mg tablet Take 1 tablet (40 mg) by mouth once daily.      BD Calista 2nd Gen Pen Needle 32 gauge x 5/32\" needle USE SUBCUTANEOUSLY AS DIRECTED TWICE DAILY      clopidogrel (Plavix) 75 mg tablet Take 1 tablet (75 mg) by mouth once daily.      ferrous gluconate 324 (38 Fe) MG tablet Take 1 tablet (324 mg) by mouth once daily with breakfast. 30 tablet 3    FreeStyle Shakir 14 Day Sensor kit USE AS DIRECTED TO CHECK SUGARS 3 TO 4 TIMES DAILY      FreeStyle Lite Strips strip twice a day.      HumaLOG KwikPen Insulin 100 unit/mL injection up to 15 units Subcutaneous three times a day per sliding scale      ipratropium-albuteroL (Duo-Neb) 0.5-2.5 mg/3 mL nebulizer solution Take 3 mL by nebulization every 6 hours if needed.      Lantus Solostar U-100 Insulin 100 unit/mL (3 mL) pen Inject 14 Units under the skin once daily at bedtime.      levothyroxine (Synthroid, Levoxyl) 200 mcg tablet Take 1 tablet (200 mcg) by mouth once daily in the morning. Take before meals. 90 tablet 3    levothyroxine (Synthroid, Levoxyl) 50 mcg tablet Take 1 tablet (50 mcg) by mouth once daily in the morning. Take before meals.      metoprolol succinate XL (Toprol-XL) 50 mg 24 hr tablet TAKE ONE TABLET BY MOUTH TWO TIMES A  tablet 1    oxygen (O2) gas therapy 2 l/min nasal cannula      pantoprazole (ProtoNix) 40 mg EC tablet Take 1 tablet (40 mg) by mouth 2 times a day. 60 tablet 6    potassium chloride CR 20 mEq ER tablet TAKE ONE TABLET BY MOUTH EVERY DAY WITH FOOD 90 tablet 1    SITagliptin phosphate (Januvia) 100 mg tablet Take 1 tablet (100 mg) by mouth once daily.      venlafaxine XR (Effexor-XR) 75 mg 24 hr capsule Take 1 capsule (75 mg) by mouth once daily.      [DISCONTINUED] Soaanz 60 mg tablet Take 1 tablet by mouth once daily. 30 tablet 3      Results for orders placed or performed during the hospital encounter of 02/02/24 (from the past 24 hour(s))   POCT GLUCOSE   Result Value Ref Range    POCT Glucose 46 (L) 74 - 99 " mg/dL   POCT GLUCOSE   Result Value Ref Range    POCT Glucose 122 (H) 74 - 99 mg/dL   POCT GLUCOSE   Result Value Ref Range    POCT Glucose 154 (H) 74 - 99 mg/dL   Comprehensive metabolic panel   Result Value Ref Range    Glucose 244 (H) 74 - 99 mg/dL    Sodium 140 136 - 145 mmol/L    Potassium 3.7 3.5 - 5.3 mmol/L    Chloride 101 98 - 107 mmol/L    Bicarbonate 29 21 - 32 mmol/L    Anion Gap 14 10 - 20 mmol/L    Urea Nitrogen 72 (H) 6 - 23 mg/dL    Creatinine 3.03 (H) 0.50 - 1.05 mg/dL    eGFR 16 (L) >60 mL/min/1.73m*2    Calcium 7.3 (L) 8.6 - 10.3 mg/dL    Albumin 2.3 (L) 3.4 - 5.0 g/dL    Alkaline Phosphatase 76 33 - 136 U/L    Total Protein 4.9 (L) 6.4 - 8.2 g/dL    AST 10 9 - 39 U/L    Bilirubin, Total 0.3 0.0 - 1.2 mg/dL    ALT 8 7 - 45 U/L   POCT GLUCOSE   Result Value Ref Range    POCT Glucose 207 (H) 74 - 99 mg/dL   POCT GLUCOSE   Result Value Ref Range    POCT Glucose 200 (H) 74 - 99 mg/dL        Physical Exam  Constitutional:       Appearance: She is ill-appearing.      Comments: Chronically ill appearing. nad   HENT:      Head: Normocephalic.      Nose: Nose normal.      Mouth/Throat:      Mouth: Mucous membranes are dry.   Eyes:      Extraocular Movements: Extraocular movements intact.      Pupils: Pupils are equal, round, and reactive to light.   Cardiovascular:      Comments: Regular, with ectopy. 1/6 systolic murmur  Pulmonary:      Comments: Good inspiratory effort. Bilateral basilar crackles, but decreased breath sounds right base  Abdominal:      General: Bowel sounds are normal.      Palpations: Abdomen is soft.   Genitourinary:     Comments: vega  Musculoskeletal:      Comments: Dressings on LLE wounds.   Edema improved, trace-1+ above dressings now. Faint distal pulses   Skin:     General: Skin is dry.   Neurological:      Mental Status: She is alert and oriented to person, place, and time.      Motor: Weakness present.   Psychiatric:         Mood and Affect: Mood normal.         Behavior:  Behavior normal.         Relevant Results       Assessment/Plan        This patient has a urinary catheter   Reason for the urinary catheter remaining today? urinary retention/bladder outlet obstruction, acute or chronic      Principal Problem:    CHF (congestive heart failure), NYHA class I, acute on chronic, combined (CMS/[facility])    Ms Morris has had an extended stay. Decompensated heart failure , with andrea on ckd, pleural effusions, hypoglycemia now off and on over weekend.   Acute decompensated heart failure, combined. Placed on high dose lasix several days ago with fair response. 1600 over last 24 hours. Also with pericardial effusion on echo. CXR on 9th showed marked enlargement of effusion, will recheck cxr tomorrow  ANDREA, on ckd. Creat above baseline, but has stabilized despite the high dose lasix. May be able to avoid dialysis at this time. Awaiting further input from nephro  PAF. Periods of bradycardia noted, cards aware.  DM. Over weekend, she had a couple sugars under 60. Today is high again. Oral intake has been very poor, states she would drink ensure. Continue to monitor closely.  Hypothyroid, stable on supplement  Pleural effusion, as above. Did have thora, over 2 liters removed at that time  COPD, no exacerbation at this time  Hypoalbuminemia  She wants  at [place], if unable to be convinced for SNF              Alejandra Marshall MD

## 2024-02-12 NOTE — SIGNIFICANT EVENT
"Complained to nurse of  \"no BM in a month \" of constipation and added suppository and lactulose.   Nurse assessed and has BS x's 4.   She had a small BM prior to receiving medication.  She refused lactulose.    "

## 2024-02-12 NOTE — PROGRESS NOTES
"Daphne Morris is a 68 y.o. female on day 10 of admission presenting with CHF (congestive heart failure), NYHA class I, acute on chronic, combined (CMS/HCC).      Subjective   She is sitting up in the bed. Still edematous, wants to go home.       Review of systems:  Constitutional: negative for fever, chills, or malaise  Neuro: negative for dizziness, headache, numbness, tingling  ENT: Negative for nasal congestion or sore throat  CV: negative for chest pain, palpitations  GI: negative for abd pain, nausea, vomiting or diarrhea  : negative for dysuria, frequency, or urgency  Musculoskeletal: Negative for weakness, myalgia, or arthralgia  Endocrine: Negative for polyuria or polydipsia         Objective   Constitutional: Well developed, awake/alert/oriented x3, no distress, alert and cooperative  Eyes: PERRL, EOMI, clear sclera  ENMT: mucous membranes moist, no apparent injury, no lesions seen  Head/Neck: Neck supple, no apparent injury, thyroid without mass or tenderness, No JVD, trachea midline, no bruits  Respiratory/Thorax: Patent airways, CTAB, diminished and crackles in bases, breath sounds with good chest expansion, thorax symmetric  Cardiovascular: Regular, rate and rhythm, no murmurs, 2+ equal pulses of the extremities, normal S 1and S 2  Gastrointestinal: Nondistended, soft, non-tender, no rebound tenderness or guarding, no masses palpable, no organomegaly, +BS, no bruits  Musculoskeletal: ROM intact, no joint swelling, normal strength  Extremities:  2+ pitting edema BUE and lower extremities  Neurological: alert and oriented x3, intact senses, motor, response and reflexes, normal strength  Lymphatic: No significant lymphadenopathy  Psychological: Appropriate mood and behavior  Skin: Warm and dry, no lesions, no rashes      Last Recorded Vitals  /70 (BP Location: Left arm, Patient Position: Lying)   Pulse 69   Temp 37.1 °C (98.8 °F) (Temporal)   Resp 17   Ht 1.676 m (5' 6\")   Wt 99.8 kg (220 " lb)   SpO2 100%   BMI 35.51 kg/m²     Intake/Output last 3 Shifts:  I/O last 3 completed shifts:  In: 360 (3.6 mL/kg) [P.O.:360]  Out: 2900 (29.1 mL/kg) [Urine:2900 (0.8 mL/kg/hr)]  Weight: 99.8 kg   I/O this shift:  In: 240 [P.O.:240]  Out: 600 [Urine:600]    Relevant Results  Scheduled medications  [Held by provider] amiodarone, 200 mg, oral, Daily with breakfast  amLODIPine, 10 mg, oral, Daily  aspirin, 81 mg, oral, Daily  atorvastatin, 40 mg, oral, Daily  bisacodyl, 10 mg, rectal, Daily  clopidogrel, 75 mg, oral, Daily  dextrose, 25 g, intravenous, Once  ferrous gluconate, 324 mg, oral, Daily with breakfast  furosemide, 80 mg, intravenous, q12h  heparin (porcine), 5,000 Units, subcutaneous, q8h  insulin glargine, 8 Units, subcutaneous, Nightly  insulin lispro, 0-15 Units, subcutaneous, TID with meals  ipratropium-albuteroL, 3 mL, nebulization, TID  lactulose, 20 g, oral, Daily  lactulose, 20 g, oral, Daily  levothyroxine, 200 mcg, oral, Daily before breakfast  levothyroxine, 50 mcg, oral, Daily before breakfast  [Held by provider] metoprolol succinate XL, 50 mg, oral, BID  pantoprazole, 40 mg, oral, BID  [Held by provider] potassium chloride CR, 20 mEq, oral, Daily  [Held by provider] SITagliptin phosphate, 25 mg, oral, Daily  venlafaxine XR, 75 mg, oral, Daily      Continuous medications     PRN medications  PRN medications: acetaminophen, dextrose 10 % in water (D10W), dextrose 10 % in water (D10W), dextrose, dextrose, glucagon, glucagon, HYDROmorphone, ipratropium-albuteroL, melatonin, oxygen, oxygen, oxygen    Results for orders placed or performed during the hospital encounter of 02/02/24 (from the past 24 hour(s))   POCT GLUCOSE   Result Value Ref Range    POCT Glucose 122 (H) 74 - 99 mg/dL   POCT GLUCOSE   Result Value Ref Range    POCT Glucose 154 (H) 74 - 99 mg/dL   Comprehensive metabolic panel   Result Value Ref Range    Glucose 244 (H) 74 - 99 mg/dL    Sodium 140 136 - 145 mmol/L    Potassium 3.7  3.5 - 5.3 mmol/L    Chloride 101 98 - 107 mmol/L    Bicarbonate 29 21 - 32 mmol/L    Anion Gap 14 10 - 20 mmol/L    Urea Nitrogen 72 (H) 6 - 23 mg/dL    Creatinine 3.03 (H) 0.50 - 1.05 mg/dL    eGFR 16 (L) >60 mL/min/1.73m*2    Calcium 7.3 (L) 8.6 - 10.3 mg/dL    Albumin 2.3 (L) 3.4 - 5.0 g/dL    Alkaline Phosphatase 76 33 - 136 U/L    Total Protein 4.9 (L) 6.4 - 8.2 g/dL    AST 10 9 - 39 U/L    Bilirubin, Total 0.3 0.0 - 1.2 mg/dL    ALT 8 7 - 45 U/L   POCT GLUCOSE   Result Value Ref Range    POCT Glucose 207 (H) 74 - 99 mg/dL   POCT GLUCOSE   Result Value Ref Range    POCT Glucose 200 (H) 74 - 99 mg/dL       XR chest 1 view   Final Result   Large right pleural effusion, significantly increased compared to 1   week ago.             Signed by: Sukumar Daugherty 2/9/2024 3:56 PM   Dictation workstation:   [id number]      Cardiac catheterization - non-coronary   Final Result      Transthoracic Echo (TTE) Complete   Final Result      XR chest 1 view   Final Result   Marked interval improvement in the right pleural effusion with   interval thoracentesis. No pneumothorax.        Pulmonary vascular congestion        Minimal atelectasis right lung base        MACRO:   None.        Signed by: Rayna Carson 2/2/2024 3:23 PM   Dictation workstation:   [id number]      XR knee left 4+ views   Final Result   1. Small joint effusion and degenerative change of the knee without   osseous injury evident. Knowledge of any trauma may be helpful. If   there is persistent concern for osseous injury, cross-sectional   imaging can be considered.   2. Reticular increased density in the subcutaneous tissues which may   represent lymphedema and/or cellulitis.        MACRO:   None        Signed by: Adama Jonas 2/2/2024 4:24 PM   Dictation workstation:   [id number]      US renal complete   Final Result   No left hydronephrosis. Right total nephrectomy.        MACRO:   None        Signed by: Amando Starks 2/2/2024 2:36 PM   Dictation  workstation:   [id number]          Transthoracic Echo (TTE) Complete    Result Date: 2/5/2024   [facility], [id number] [address]               Tel [phone] and Fax [phone] TRANSTHORACIC ECHOCARDIOGRAM REPORT  Patient Name:      MOE MAYA      Reading Physician:    [id number] Nicholas Antonio MD Study Date:        2/5/2024             Ordering Provider:    [id number] DOMONIQUE PURCELL MRN/PID:           [id number]             Fellow: Accession#:        [id number]         Nurse: Date of Birth/Age: 1955 / 68 years Sonographer:          Nina Angel                                                               RDNALLELY Gender:            F                    Additional Staff: Height:            167.64 cm            Admit Date:           2/2/2024 Weight:            102.51 kg            Admission Status:     Inpatient -                                                               Routine BSA:               2.11 m2              Encounter#:           [id number]                                         Department Location:  [facility] Non                                                               Invasive Blood Pressure: 147 /65 mmHg Study Type:    TRANSTHORACIC ECHO (TTE) COMPLETE Diagnosis/ICD: Acute combined systolic (congestive) and diastolic (congestive)                heart failure (CHF)-I50.41 Indication:    Congestive Heart Failure CPT Code:      Echo Complete w Full Doppler-[id number] Patient History: Diabetes:         Yes Pertinent         A-Fib, HTN, Dyspnea and LE Edema. Watchman device, elevated History:          BNP,. Study Detail: The following Echo studies were performed: 2D, M-Mode, Doppler and               color flow.  PHYSICIAN INTERPRETATION: Left Ventricle: The left ventricular systolic function is normal, with an estimated ejection  fraction of 60-65%. There are no regional wall motion abnormalities. The left ventricular cavity size is normal. Spectral Doppler shows an impaired relaxation pattern of left ventricular diastolic filling. Left Atrium: The left atrium is moderately dilated. Right Ventricle: The right ventricle is mildly enlarged. There is mildly reduced right ventricular systolic function. Right Atrium: The right atrium is moderately dilated. Aortic Valve: The aortic valve is trileaflet. There is mild aortic valve cusp calcification. There is trivial aortic valve regurgitation. The peak instantaneous gradient of the aortic valve is 14.1 mmHg. The mean gradient of the aortic valve is 9.1 mmHg. Mitral Valve: The mitral valve is normal in structure. There is mild mitral annular calcification. There is mild to moderate mitral valve regurgitation. Tricuspid Valve: The tricuspid valve is structurally normal. There is moderate tricuspid regurgitation. Pulmonic Valve: The pulmonic valve is structurally normal. There is mild pulmonic valve regurgitation. Pericardium: There is a small to moderate pericardial effusion posterior to the left ventricle. There is no evidence of cardiac tamponade. Aorta: The aortic root is normal. Pulmonary Artery: The tricuspid regurgitant velocity is 3.04 m/s, and with an estimated right atrial pressure of 3 mmHg, the estimated pulmonary artery pressure is mildly elevated with the RVSP at 39.9 mmHg. Pulmonary Veins: The pulmonary veins appear normal and return normally to the left atrium. Systemic Veins: The inferior vena cava appears to be of normal size. There is IVC inspiratory collapse greater than 50%.  CONCLUSIONS:  1. Left ventricular systolic function is normal with a 60-65% estimated ejection fraction.  2. Spectral Doppler shows an impaired relaxation pattern of left ventricular diastolic filling.  3. There is mildly reduced right ventricular systolic function.  4. The left atrium is moderately dilated.   5. The right atrium is moderately dilated.  6. There is a small to moderate pericardial effusion.  7. There is no evidence of cardiac tamponade.  8. Mild to moderate mitral valve regurgitation.  9. Moderate tricuspid regurgitation visualized. 10. Normal CVP. Estimated PASP around 45 mm Hg. QUANTITATIVE DATA SUMMARY: 2D MEASUREMENTS:                           Normal Ranges: IVSd:          1.52 cm    (0.6-1.1cm) LVPWd:         1.42 cm    (0.6-1.1cm) LVIDd:         4.35 cm    (3.9-5.9cm) LVIDs:         2.94 cm LV Mass Index: 120.7 g/m2 LV % FS        32.3 % LA VOLUME:                              Normal Ranges: LA Vol A4C:       79.5 ml    (22+/-6mL/m2) LA Vol A2C:       78.2 ml LA Vol BP:        79.7 ml LA Vol Index A4C: 37.7ml/m2 LA Vol Index A2C: 37.1 ml/m2 LA Vol Index BP:  37.9 ml/m2 LA Volume Index:  37.8 ml/m2 LA Vol A4C:       76.0 ml LA Vol A2C:       73.9 ml RA VOLUME BY A/L METHOD:                       Normal Ranges: RA Area A4C: 17.8 cm2 M-MODE MEASUREMENTS:                  Normal Ranges: Ao Root: 3.00 cm (2.0-3.7cm) LAs:     5.08 cm (2.7-4.0cm) LV SYSTOLIC FUNCTION BY 2D PLANIMETRY (MOD):                     Normal Ranges: EF-A4C View: 63.4 % (>=55%) EF-A2C View: 63.8 % EF-Biplane:  62.5 % LV DIASTOLIC FUNCTION:                             Normal Ranges: MV Peak E:      1.33 m/s    (0.7-1.2 m/s) MV Peak A:      0.53 m/s    (0.42-0.7 m/s) E/A Ratio:      2.52        (1.0-2.2) MV e'           0.06 m/s    (>8.0) MV lateral e'   0.06 m/s MV medial e'    0.06 m/s MV A Dur:       110.73 msec E/e' Ratio:     22.09       (<8.0) PulmV Sys Mike:  53.97 cm/s PulmV Dugan Mike: 77.63 cm/s PulmV S/D Mike:  0.70 MITRAL VALVE:                 Normal Ranges: MV DT: 245 msec (150-240msec) MITRAL INSUFFICIENCY:                         Normal Ranges: MR VTI:     171.38 cm MR Vmax:    489.73 cm/s MR Volume:  20.07 ml MR Flow Rt: 57.36 ml/s MR EROA:    0.12 cm2 AORTIC VALVE:                                    Normal Ranges: AoV  Vmax:                1.88 m/s  (<=1.7m/s) AoV Peak P.1 mmHg (<20mmHg) AoV Mean P.1 mmHg  (1.7-11.5mmHg) LVOT Max Mike:            0.97 m/s  (<=1.1m/s) AoV VTI:                 46.22 cm  (18-25cm) LVOT VTI:                24.89 cm LVOT Diameter:           1.96 cm   (1.8-2.4cm) AoV Area, VTI:           1.62 cm2  (2.5-5.5cm2) AoV Area,Vmax:           1.56 cm2  (2.5-4.5cm2) AoV Dimensionless Index: 0.54  RIGHT VENTRICLE: RV Basal 3.80 cm RV Mid   2.80 cm RV Major 8.0 cm TAPSE:   18.0 mm RV s'    0.13 m/s TRICUSPID VALVE/RVSP:                             Normal Ranges: Peak TR Velocity: 3.04 m/s RV Syst Pressure: 39.9 mmHg (< 30mmHg) PULMONIC VALVE:                      Normal Ranges: PV Max Mike: 1.0 m/s  (0.6-0.9m/s) PV Max PG:  3.9 mmHg Pulmonary Veins: PulmV Dugan Mike: 77.63 cm/s PulmV S/D Mike:  0.70 PulmV Sys Mike:  53.97 cm/s  [id number] Nicholas Antonio MD Electronically signed on 2024 at 5:06:05 PM  ** Final **           Assessment/Plan   Principal Problem:    CHF (congestive heart failure), NYHA class I, acute on chronic, combined (CMS/[facility])    Echocardiogram from 2023 (post watchman) reviewed: LVEF 55%, small to moderate pericardial effusions         1. Acute on chronic hypoxic/hypercapnic respiratory failure 2/2 acute on chronic diastolic CHF and pneumonia, COPD exac, Pleural effusion  -CXR showed large right pleural effusion  -CTA of the chest showed large right pleural effusion with complete collapse of the right lower lung, pneumonia, a small pericardial effusion, and soft tissue edema at the chest and abd wall.   -bronchodilators  -Steroids  -oxygen support  -Still with ignificant leg to abd swelling and shortness of breath  -Strict I & Os  -Daily weights  -2gm na diet  -Repeat echo as above  -Unable to start SGLT2 inhibitors with current GFR  -Pulmonary following, s/p thoracentesis 2.3L  -s/p RHC showed MEAN PA 30 MILD PULMONARY HTN MEAN RA 9 MM HG MEAN PCWP 23 MM HG  CARDIAC OUTPUT 8.03 CI 3.79 NO SHUNTS   -cont IV lasix 80mg BID  -Monitor on tele     2. Elevated troponins-non MI elevation  -Denies chest pain  -Does have shortness of breath-worsening for weeks  -Had a LHC planned ->unable to lay flat  -Cont asa, plavix, statin, metoprolol  -Unable to do LHC due to Creatinine 3.6  -Monitor on tele     3. Paroxysmal atrial fibrillation with RVR  -s/p Watchman in Aug 2023  -Currently SB  -Cont amiodarone and metoprolol for rate control  -Monitor on tele     4. Hypertension  -stable  -2gm na diet  -cont meds  -monitor     5. Anemia  -Appear stable     6. Acute on Chronic kidney disease  -s/p nephrectomy  -Renal consulted, note reviewed increased lasix to 80mg BID  -Renal US negative  -Monitor closely on Lasix IV    7. Diabetes  -ISS  -Accuchecks     8. Hypothyroidism  -Cont synthroid     9. Pericardial effusion  -Mild to moderate on echo  -Cont diuresis      CARLENE Villegas-CNP

## 2024-02-12 NOTE — CARE PLAN
The patient's goals for the shift include  working with therapy    The clinical goals for the shift include Pt. will work with therapy so she can be discharged home.

## 2024-02-12 NOTE — CONSULTS
Nutrition Initial Assessment:   Nutrition Assessment    Reason for Assessment: Dietitian discretion (LOS)    Patient is a 68 y.o. female on day 10 of admission presenting with CHF, NYHA class I, acute on chronic, combined.  Multiple unsuccessful attempt to see patient. Chart reviewed.    Past Medical History:   Diagnosis Date    Acute CHF (CMS/Formerly [facility])     2023    Arthritis     Atrial fibrillation (CMS/[facility]ly [facility])     Chronic diastolic CHF (congestive heart failure) (CMS/[facility]ly [facility])     COPD (chronic obstructive pulmonary disease) (CMS/[facility]ly [facility])     CVA (cerebral vascular accident) (CMS/HCC)     - righ sided weakness    Depression     Diabetes (CMS/[facility])     Essential tremor     HTN (hypertension)     Hyperlipidemia     Hypothyroidism     s/p radioactive iodine treatment    Impacted cerumen, left ear     Impacted cerumen of left ear    Left ventricular ejection fraction greater than 40%     40-45%    Noncompliance     Personal history of other diseases of the respiratory system     History of acute bronchitis    Renal carcinoma, right (CMS/[facility])     s/p partial nephrectomy 2021    Vitamin D deficiency      Past Surgical History:   Procedure Laterality Date    ANKLE SURGERY          CARDIAC CATHETERIZATION N/A 2024    Procedure: Right Heart Cath;  Surgeon: Nicholas Antonio MD;  Location: [facility] Cardiac Cath Lab;  Service: Cardiovascular;  Laterality: N/A;    CATARACT EXTRACTION Right     2019     SECTION, CLASSIC      MR CHEST ANGIO W AND WO IV CONTRAST  2023    MR CHEST ANGIO W AND WO IV CONTRAST 2023 [facility] MRI    MR HEAD ANGIO WO IV CONTRAST  2021    MR HEAD ANGIO WO IV CONTRAST [facility]    NEPHRECTOMY  2021    SHOULDER SURGERY             Nutrition History:  Energy Intake: Good > 75 %  Food and Nutrient History: per flowsheet and nursing, intake varies, %, average 80%. patient is eating 100% of her meals most of the time.  Vitamin/Herbal Supplement Use:  "Nepro  Food Allergies/Intolerances:  None  GI Symptoms: Constipation  Oral Problems: None       Anthropometrics:  Height: 167.6 cm (5' 6\")   Weight: 99.8 kg (220 lb)   BMI (Calculated): 35.53        Adjusted Body Weight (kg): 75 kg    Weight History:   Daily Weight  02/12/24 : 99.8 kg (220 lb)  02/01/24 : 89.4 kg (197 lb)  01/29/24 : 89.4 kg (197 lb)  01/05/24 : 88.5 kg (195 lb)  12/04/23 : 83.9 kg (185 lb)  10/13/23 : 81.6 kg (180 lb)  09/14/23 : 77.1 kg (170 lb)  08/03/23 : 80.7 kg (178 lb)  08/02/23 : 77.1 kg (170 lb)  07/05/23 : 85.8 kg (189 lb 2 oz)      Weight Change %:  Weight History / % Weight Change: 11.6% weight gain X 12 days, ~13% weight gain X 1 month. some weighty changes may be fluid related however I question weight accuracy. request updated weights to better assess weight changes.    Nutrition Focused Physical Exam Findings:  defer: multiple unsuccessful attempts to see pt.   Edema:  Edema: +1 trace  Edema Location: BUE  Physical Findings:  Skin: Positive (multiple skin tears/wounds please refer to nursing flow sheet for complete skin assessment)    Nutrition Significant Labs:  CBC Trend:   Results from last 7 days   Lab Units 02/11/24  0559 02/06/24  0543   WBC AUTO x10*3/uL 8.1 8.5   RBC AUTO x10*6/uL 2.87* 2.85*   HEMOGLOBIN g/dL 8.5* 8.6*   HEMATOCRIT % 28.9* 29.5*   MCV fL 101* 104*   PLATELETS AUTO x10*3/uL 240 236    , BMP Trend:   Results from last 7 days   Lab Units 02/12/24  0537 02/11/24  0559 02/10/24  0542 02/08/24  0931   GLUCOSE mg/dL 244* 46* 121* 120*   CALCIUM mg/dL 7.3* 7.3* 7.3* 7.4*   SODIUM mmol/L 140 143 142 140   POTASSIUM mmol/L 3.7 3.5 3.7 4.1   CO2 mmol/L 29 30 30 31   CHLORIDE mmol/L 101 105 104 102   BUN mg/dL 72* 75* 79* 74*   CREATININE mg/dL 3.03* 3.27* 3.41* 3.58*    , A1C:  Lab Results   Component Value Date    HGBA1C 9.4 (H) 10/18/2023   , Renal Lab Trend:   Results from last 7 days   Lab Units 02/12/24  0537 02/11/24  0559 02/10/24  0542 02/08/24  0931 " 02/07/24  0607 02/06/24  0543   POTASSIUM mmol/L 3.7 3.5 3.7 4.1   < > 4.8   PHOSPHORUS mg/dL  --   --   --   --   --  6.3*   SODIUM mmol/L 140 143 142 140   < > 139   EGFR mL/min/1.73m*2 16* 15* 14* 13*   < > 13*   BUN mg/dL 72* 75* 79* 74*   < > 68*   CREATININE mg/dL 3.03* 3.27* 3.41* 3.58*   < > 3.67*    < > = values in this interval not displayed.        Nutrition Specific Medications:    Current Facility-Administered Medications:     acetaminophen (Tylenol) tablet 650 mg, 650 mg, oral, q6h PRN, Luis Moffett DO, 650 mg at 02/10/24 1045    [Held by provider] amiodarone (Pacerone) tablet 200 mg, 200 mg, oral, Daily with breakfast, Milton Fishman DO, 200 mg at 02/11/24 0816    amLODIPine (Norvasc) tablet 10 mg, 10 mg, oral, Daily, Alejandra Marshall MD, 10 mg at 02/12/24 0920    aspirin EC tablet 81 mg, 81 mg, oral, Daily, Milton Fishman DO, 81 mg at 02/12/24 0919    atorvastatin (Lipitor) tablet 40 mg, 40 mg, oral, Daily, Milton Fishman DO, 40 mg at 02/12/24 0920    bisacodyl (Dulcolax) suppository 10 mg, 10 mg, rectal, Daily, Kayla Combs APRN-CNP, 10 mg at 02/11/24 2138    clopidogrel (Plavix) tablet 75 mg, 75 mg, oral, Daily, Milton Fishman DO, 75 mg at 02/12/24 0920    dextrose 10 % in water (D10W) infusion, 0.3 g/kg/hr, intravenous, Once PRN, Milton Fishman DO    dextrose 10 % in water (D10W) infusion, 0.3 g/kg/hr, intravenous, Once PRN, Alejandra Marshall MD    dextrose 50 % injection 25 g, 25 g, intravenous, q15 min PRN, Milton Fishman DO, 25 g at 02/11/24 0750    dextrose 50 % injection 25 g, 25 g, intravenous, Once, Alejandra Marshall MD    dextrose 50 % injection 25 g, 25 g, intravenous, q15 min PRN, Alejandra Marshall MD    ferrous gluconate (Fergon) 324 (38 Fe) mg tablet 324 mg, 324 mg, oral, Daily with breakfast, Milton Fishman DO, 324 mg at 02/12/24 0920    furosemide (Lasix) injection 80 mg, 80 mg, intravenous, q12h, Svetlana Santamaria MD, 80 mg at 02/12/24 0920    glucagon (Glucagen) injection 1 mg, 1 mg, intramuscular,  q15 min PRN, Milton Fishman DO    glucagon (Glucagen) injection 1 mg, 1 mg, intramuscular, q15 min PRN, Alejandra Marshall MD    heparin (porcine) injection 5,000 Units, 5,000 Units, subcutaneous, q8h, Milton Fishman DO, 5,000 Units at 02/12/24 1242    HYDROmorphone (Dilaudid) injection 0.4 mg, 0.4 mg, intravenous, q4h PRN, Luis Moffett DO    insulin glargine (Lantus) injection 8 Units, 8 Units, subcutaneous, Nightly, Alejandra Marshall MD    insulin lispro (HumaLOG) injection 0-15 Units, 0-15 Units, subcutaneous, TID with meals, Milton Fishman DO, 3 Units at 02/12/24 1242    ipratropium-albuteroL (Duo-Neb) 0.5-2.5 mg/3 mL nebulizer solution 3 mL, 3 mL, nebulization, q2h PRN, Josette Damian MD    ipratropium-albuteroL (Duo-Neb) 0.5-2.5 mg/3 mL nebulizer solution 3 mL, 3 mL, nebulization, TID, Jostete Damian MD, 3 mL at 02/12/24 1458    lactulose 20 gram/30 mL oral solution 20 g, 20 g, oral, Daily, Kayla Combs APRN-CNP    lactulose 20 gram/30 mL oral solution 20 g, 20 g, oral, Daily, CARLENE Nance-CNP, 20 g at 02/12/24 0522    levothyroxine (Synthroid, Levoxyl) tablet 200 mcg, 200 mcg, oral, Daily before breakfast, Milton Fishman DO, 200 mcg at 02/12/24 0522    levothyroxine (Synthroid, Levoxyl) tablet 50 mcg, 50 mcg, oral, Daily before breakfast, Milton Fishman DO, 50 mcg at 02/12/24 0523    melatonin tablet 5 mg, 5 mg, oral, Nightly PRN, Malu Simon MD, 5 mg at 02/10/24 2117    [Held by provider] metoprolol succinate XL (Toprol-XL) 24 hr tablet 50 mg, 50 mg, oral, BID, Milton Fishman DO, 50 mg at 02/11/24 0816    oxygen (O2) therapy, , inhalation, Continuous PRN - O2/gases, Milton Fishman DO    oxygen (O2) therapy, , inhalation, Continuous PRN - O2/gases, Luis Moffett DO, 40 percent at 02/03/24 0421    oxygen (O2) therapy, , inhalation, Continuous PRN - O2/gases, Luis Moffett DO, 2 L/min at 02/12/24 1458    pantoprazole (ProtoNix) EC tablet 40 mg, 40 mg, oral, BID, Milton Fishman DO, 40 mg at 02/12/24  0919    [Held by provider] potassium chloride CR (Klor-Con M20) ER tablet 20 mEq, 20 mEq, oral, Daily, Milton Fishman DO    [Held by provider] SITagliptin phosphate (Januvia) tablet 25 mg, 25 mg, oral, Daily, Milton Fishman DO    venlafaxine XR (Effexor-XR) 24 hr capsule 75 mg, 75 mg, oral, Daily, Milton Fishman DO, 75 mg at 02/12/24 0919     I/O:   Last BM Date: 02/12/24; Stool Appearance: Soft, Formed (02/12/24 1607)        Dietary Orders (From admission, onward)       Start     Ordered    02/11/24 1217  Oral nutritional supplements  Until discontinued        Question Answer Comment   Deliver with All meals    Select supplement: Nepro With Carbsteady        02/11/24 1217    02/07/24 1540  Adult diet Carb Controlled; 75 gram carb/meal, 45 gram Carb evening snack; 1500 mL fluid  Diet effective now        Question Answer Comment   Diet type Carb Controlled    Carb diet selection: 75 gram carb/meal, 45 gram Carb evening snack    Dietary fluid restriction / 24h: 1500 mL fluid        02/07/24 1540                     Estimated Needs:   Total Energy Estimated Needs (kCal):  (3200-5285 Kcal)  Method for Estimating Needs: 25-30 Kcal/Kg adjusted body weight (ABW) of 75 Kg  Total Protein Estimated Needs (g):  (75-90 g protein)  Method for Estimating Needs: 1-1.2 g/Kg ABW of 75 Kg  Total Fluid Estimated Needs (mL): 1500 mL  Method for Estimating Needs: per MD orders        Nutrition Diagnosis        Nutrition Diagnosis  Patient has Nutrition Diagnosis: Yes  Diagnosis Status (1): New  Nutrition Diagnosis 1: Increased nutrient needs ((protein))  Related to (1): Increased demand for nutrient  As Evidenced by (1): wounds healing,and estimated intake from diet less than estimated needs  Additional Nutrition Diagnosis: Diagnosis 2  Diagnosis Status (2): New  Nutrition Diagnosis 2: Inadequate protein energy intake  Related to (2): increased needs  As Evidenced by (2): wound healing, estimated energy intake from diet less than estimated needs  and current diagnosis of acute on chronic hypoxic/hypercapnic respiratory failure       Nutrition Interventions/Recommendations         Nutrition Prescription:  Individualized Nutrition Prescription Provided for : Continue Nepro TID providing 1260 Kcal and 57 g protein        Nutrition Interventions:   Collaboration and Referral of Nutrition Care: Other (Comment) (Nursing)    Nutrition Education:   N/A. Available upon request.       Nutrition Monitoring and Evaluation   Food/Nutrient Related History Monitoring  Monitoring and Evaluation Plan: Energy intake  Energy Intake: Estimated energy intake  Criteria: tconsumes 100% of her meals and ONS    Body Composition/Growth/Weight History  Monitoring and Evaluation Plan: Weight, Weight change  Weight: Measured weight, Weight change  Criteria: weights daily  Weight Change: Weight change percentage  Criteria: Request updated weights to better assess weight changes, monitor weight change percentage                 Time Spent/Follow-up Reminder:   Time Spent (min): 60 minutes  Last Date of Nutrition Visit: 02/12/24  Nutrition Follow-Up Needed?: Dietitian to reassess per policy  Follow up Comment: LOS

## 2024-02-12 NOTE — PROGRESS NOTES
Daphne Morris is a 68 y.o. female on day 10 of admission presenting with CHF (congestive heart failure), NYHA class I, acute on chronic, combined (CMS/HCC).      Subjective   Patient was seen and examined today.  Awake and alert.  Eager to leave the hospital.  No kidney recent complaints or concerns.  Good urine output.  Stable poor kidney function       Objective          Vitals 24HR  Heart Rate:  [51-69]   Temp:  [36 °C (96.8 °F)-37.1 °C (98.8 °F)]   Resp:  [17-22]   BP: (117-129)/(58-95)   Weight:  [99.8 kg (220 lb)]   SpO2:  [95 %-100 %]     Intake/Output last 3 Shifts:    Intake/Output Summary (Last 24 hours) at 2/12/2024 1154  Last data filed at 2/12/2024 0912  Gross per 24 hour   Intake 600 ml   Output 1350 ml   Net -750 ml       Physical Exam        General appearance: no distress awake and alert on room air, euvolemic on exam  Eyes: non-icteric  HEENT: atrumatic head, PEERLA, moist mucosa  Skin: no apparent rash  Heart: NSR, S1, S2 normal, no murmur or gallop  Lungs: Symmetrical expansion,CTA bilat no wheezing/crackles  Abdomen: soft, nt/nd, obese  Extremities: no edema bilat  Neuro: No FND,asterixis, no focal deficits noticed        RFP  Recent Labs     02/12/24  0537 02/11/24  0559 02/10/24  0542 02/08/24  0931 02/07/24  0607 02/06/24  0543 02/05/24  0555 02/04/24  0505 02/03/24  0508 02/02/24  0438 02/01/24  1443 02/01/24  0515 07/26/23  1727 07/23/23  0530 05/11/23  1801 05/11/23  1027    143 142 140 140 139 139 139 135* 142   < > 140   < > 136   < > 138   K 3.7 3.5 3.7 4.1 4.4 4.8 4.5 5.0 5.0 4.9   < > 5.4*   < > 3.6   < > 3.8    105 104 102 103 101 103 104 101 106   < > 105   < > 96*   < > 97   CO2 29 30 30 31 28 26 28 26 30 31   < > 29   < > 33*   < > 31   BUN 72* 75* 79* 74* 73* 68* 61* 56* 50* 47*   < > 49*   < > 46*   < > 38*   CREATININE 3.03* 3.27* 3.41* 3.58* 3.64* 3.67* 3.67* 3.49* 3.28* 3.21*   < > 3.25*   < > 2.90*   < > 2.2*   GLUCOSE 244* 46* 121* 120* 155* 217* 181* 189*  190* 183*   < > 220*   < > 98   < > 272*   CALCIUM 7.3* 7.3* 7.3* 7.4* 7.4* 7.5* 7.6* 7.3* 7.2* 7.5*   < > 7.5*   < > 8.1*   < > 8.4*   ALBUMIN 2.3*  --   --   --   --  2.5* 2.4* 2.2* 2.0* 2.4*  --  2.6*  --  2.6*   < > 2.8*   PHOS  --   --   --   --   --  6.3* 6.1* 6.0* 5.8* 6.0*  --   --   --  4.2  --  4.2   EGFR 16* 15* 14* 13* 13* 13* 13* 14* 15* 15*   < > 15*   < >  --    < > 24   ANIONGAP 14 12 12 11 13 17 13 14 9* 10   < > 11   < > 11   < > 10    < > = values in this interval not displayed.        Urineanalysis  Recent Labs     02/09/24  1248 02/01/24  0515 07/19/23  1437 04/26/23  1740 05/03/21  2035 04/23/21  0310 04/06/21  1200 04/10/18  1415   COLORU Yellow Yellow STRAW YELLOW YELLOW YELLOW PALE YELLOW YELLOW   APPEARANCEU Clear Clear CLEAR CLEAR CLEAR HAZY HAZY CLOUDY   SPECGRAVU 1.011 1.013 1.008 1.009 1.012 1.020 1.005 1.017   SYLVIE 5.0 5.0 6.0 6.0 6.5 6.0 6.5 5.5   PROTUR >=500 (3+)* >=500 (3+)* >=500 (3+)* 100(2+)* 300* 600* 100* 30*   GLUCOSEU 150 (2+)* >=500 (3+)* 150 (2+)* 50(1+)* 100* 150* 50* 300*   BLOODU MODERATE (2+)* NEGATIVE LARGE (3+)* SMALL(1+)* TRACE* TRACE* MODERATE* TRACE*   KETONESU NEGATIVE NEGATIVE NEGATIVE NEGATIVE NEGATIVE NEGATIVE NEGATIVE NEGATIVE   BILIRUBINU NEGATIVE NEGATIVE NEGATIVE NEGATIVE NEGATIVE NEGATIVE NEGATIVE NEGATIVE   NITRITEU NEGATIVE NEGATIVE NEGATIVE NEGATIVE NEGATIVE NEGATIVE POSITIVE* NEGATIVE   LEUKOCYTESU TRACE* NEGATIVE NEGATIVE NEGATIVE NEGATIVE SMALL* LARGE* LARGE*       Urine Electrolytes  Recent Labs     02/09/24  1248 02/01/24  0515 07/19/23  1437 04/30/23  1316 04/26/23  1740 05/03/21  2035 04/23/21  0310 04/06/21  1200 04/10/18  1415   SODIUMURR  --  40  --  27  --   --   --   --   --    NACREATUR  --  75  --  27  --   --   --   --   --    KUR  --  64  --   --   --   --   --   --   --    KCREATUR  --  119  --   --   --   --   --   --   --    CREATU  --  53.6  53.6  --  101.0  --   --   --   --   --    PROTUR >=500 (3+)* >=500 (3+)* >=500 (3+)*  --   100(2+)* 300* 600* 100* 30*   ALBUMINUR  --  >2,250.0  --   --   --   --   --   --   --         Urine Micro  Recent Labs     02/09/24  1248 02/01/24  0515 07/19/23  1437 04/26/23  1740 05/03/21  2035 04/23/21  0310 04/06/21  1200 04/10/18  1415   WBCU 6-10* 1-5 5* 1 NONE SEEN 53* LOADED 1,077*   RBCU 11-20* 3-5 >182* 1 2-4 5* 16-25 5*   HYALCASTU 2+* OCCASIONAL*  --   --   --  70  --  3   SQUAMEPIU 1-9 (SPARSE)  --  1 1  --  MODERATE  --  FEW   BACTERIAU 1+* 1+* 1+*  --   --  POSITIVE PRESENT  Performed at [facility] [address] [facility] [address]   POSITIVE   MUCUSU FEW FEW FEW  --   --   --   --   --         Iron  Recent Labs     05/02/23  0607 04/28/23  0615 04/27/23  0624 02/23/23  1113   IRON  --   --  26* 54   TIBC  --   --  289 281   IRONSAT  --   --  9* 19.2   FERRITIN 71 80  --  42       [Held by provider] amiodarone, 200 mg, oral, Daily with breakfast  amLODIPine, 10 mg, oral, Daily  aspirin, 81 mg, oral, Daily  atorvastatin, 40 mg, oral, Daily  bisacodyl, 10 mg, rectal, Daily  clopidogrel, 75 mg, oral, Daily  dextrose, 25 g, intravenous, Once  ferrous gluconate, 324 mg, oral, Daily with breakfast  furosemide, 80 mg, intravenous, q12h  heparin (porcine), 5,000 Units, subcutaneous, q8h  [Held by provider] insulin glargine, 16 Units, subcutaneous, Nightly  insulin lispro, 0-15 Units, subcutaneous, TID with meals  ipratropium-albuteroL, 3 mL, nebulization, TID  lactulose, 20 g, oral, Daily  lactulose, 20 g, oral, Daily  levothyroxine, 200 mcg, oral, Daily before breakfast  levothyroxine, 50 mcg, oral, Daily before breakfast  [Held by provider] metoprolol succinate XL, 50 mg, oral, BID  pantoprazole, 40 mg, oral, BID  [Held by provider] potassium chloride CR, 20 mEq, oral, Daily  [Held by provider] SITagliptin phosphate, 25 mg, oral, Daily  venlafaxine XR, 75 mg, oral, Daily             Assessment/Plan                Principal Problem:    CHF (congestive heart failure), NYHA class I, acute on  chronic, combined (CMS/[facility])           Daphne Morris  is a 68 y.o. female who has past medical history of  heart failure, atrial fibrillation s/p Watchman, right nephrectomy, hypertension, type 2 DM admitted for acute hypoxic respiratory failure 2/2 to pneumonia and acute decompensated heart failure.        # ANDREA  -Likely # Cardiorenal syndrome 2/2 # Acute decompensated CHF  - Baseline SCr 1.8-2.5 most likely atherosclerotic cardiovascular disease/diabetic nephropathy, prior nephrectomy  - Most recent Scr during this admission 3-3.2, GFR 15 and has been plateauing around this level.  Possible cardiorenal syndrome  - Urine analysis showed 3+ protien in urine, negative blood, 3-5 RBCs, 1-5 WBCs  -Kidney ultrasound with no obstruction on the left side.  Right total nephrectomy noticed  -Currently on Lasix 80 mg IV twice daily with good urine output and within normal electrolytes  -Urine lites with no evidence for prerenal injury     Plan  -No indication to immediately initiate dialysis however she might need in the future.  Currently kidney function is poor stable and within normal electrolytes  -Consider transitioning to p.o. diuretics.  I suggest Lasix 80 mg p.o. twice daily to start with (this is equal to half what she is getting currently 80 mg IV twice daily).  Will defer diuretic management to cardiology team  - strict I/Os, daily weight  - avoid contrast if possible  - would recommend starting an SGLT2 inhibitor outpatient if GFR returns to >25     # Acute hypoxic hypercapnic respiratory failure  # Pleural effusion  # Acute decompensated CHF  # Community acquired pneumonia  # possible COPD exacerbation  - CXR: Large right pleural effusion  - CT chest: pleural effusion with complete collapse of RLL, Atelectasis/consolidation at RUL and RML, small pericardial effusion  - Last echo w/ LVEF of 55% and small pericardial effusion  - pulm and cardiology are consulted        Will continue to follow along    John S  MD Jean

## 2024-02-12 NOTE — PROGRESS NOTES
"Physical Therapy    Physical Therapy Treatment    Patient Name: Daphne Morris  MRN: [id number]  Today's Date: 2/12/2024  Time Calculation  Start Time: 1516  Stop Time: 1610  Time Calculation (min): 54 min       Assessment/Plan   PT Assessment  PT Assessment Results: Decreased strength, Decreased endurance, Decreased mobility, Obesity  Rehab Prognosis: Good  Evaluation/Treatment Tolerance: Patient limited by fatigue  Medical Staff Made Aware: Yes  Strengths: Support of extended family/friends  Barriers to Participation: Insight into problems, Premorbid level of function  End of Session Communication: Bedside nurse, PCT/NA/CTA  End of Session Patient Position: Bed, 3 rail up, Alarm on  PT Plan  Inpatient/Swing Bed or Outpatient: Inpatient  PT Plan  Treatment/Interventions: Bed mobility, Transfer training, Gait training, Strengthening, Therapeutic exercise  PT Plan: Skilled PT  PT Frequency: 3 times per week  PT Discharge Recommendations: Moderate intensity level of continued care  Equipment Recommended upon Discharge: Wheeled walker  PT - OK to Discharge: Yes (per PT POC)      General Visit Information:   PT  Visit  PT Received On: 02/12/24  General  Reason for Referral: 67 yo female seen this date for follow-up PT tx to progress mobility and gait.  Pt. admit for CHF, general weakness, impaired ADL, impaired mobility  Referred By: Milton Fishman DO  Past Medical History Relevant to Rehab: heart failure, atrial fibrillation, Hypertension, type 2 DM  Family/Caregiver Present: No  Prior to Session Communication: Bedside nurse, PCT/NA/CTA  Patient Position Received: Bed, 3 rail up, Alarm off, not on at start of session  Preferred Learning Style: verbal  General Comment: 3L O2, vega, B LE ace wraps. Pt. insistent upon going home to see her cat. After further discussion, pt. fearful of rehab since it \"did not help\" her mother and she fears \"getting stuck there forever.\"  Extensive education re: benefits and current need for " rehab.    Subjective   Precautions:  Precautions  Medical Precautions: Fall precautions  Precautions Comment: Pt. fears falling, needs gentle encouragement and significant education re: benefits and need for mobility training    Objective   Pain:  Pain Assessment  Pain Assessment: 0-10  Pain Score: 0 - No pain  Cognition:  Cognition  Overall Cognitive Status: Within Functional Limits  Orientation Level: Oriented X4  Insight:  (Pt. is aware of her predicament but has strong desire to see her cat and return home with prior supports in place (neighbors, male friend who stays at night recently))  Postural Control:  Static Sitting Balance  Static Sitting-Balance Support: No upper extremity supported  Static Sitting-Level of Assistance: Close supervision  Static Sitting-Comment/Number of Minutes: >15 min sitting EOB  Static Standing Balance  Static Standing-Balance Support: Bilateral upper extremity supported  Static Standing-Level of Assistance: Close supervision  Static Standing-Comment/Number of Minutes: 3 min x1, 2 min x 1 using FWW for support  Dynamic Standing Balance  Dynamic Standing-Comments: Unable to assist in pericare following bowel movement on commode - needed BUE support to maintain balance  Extremity/Trunk Assessments:  RUE   RUE : Within Functional Limits  LUE   LUE: Within Functional Limits  RLE   RLE : Within Functional Limits  LLE   LLE : Within Functional Limits  Activity Tolerance:  Activity Tolerance  Endurance: Decreased tolerance for upright activites  Early Mobility/Exercise Safety Screen: Proceed with mobilization - No exclusion criteria met  Treatments:     Therapeutic Activity  Therapeutic Activity Performed: Yes  Therapeutic Activity 1: Completed/attempted supine to sit, sit to supine, sit/stand from low EOB, sit/stand from raised EOB, bed-to-commode via FWW, commode to bed via FWW.  Extensive education re: problem solving to complete each activity as independently as possible to demonstrate  to pt. need for assist at this time in order to safely complete.    Bed Mobility  Bed Mobility: Yes  Bed Mobility 1  Bed Mobility 1: Sitting to supine  Level of Assistance 1: Maximum assistance  Bed Mobility Comments 1: from flat bed with 4-5 pillows to best simulate home bed set-up; pt. unable to move her legs to EOB or move her trunk to upright position with her arms (required MAX A x 1  Bed Mobility 2  Bed Mobility  2: Sitting to supine  Level of Assistance 2: Moderate assistance  Bed Mobility Comments 2: unable to lift either LE into bed despite trying to grab her sock and lift (to simulate her usual method at home)  Bed Mobility 3  Bed Mobility 3: Scooting  Level of Assistance 3: Maximum assistance (x2)  Bed Mobility Comments 3: Able to move her buttocks toward side, but unable to scoot up in bed without assist x2    Ambulation/Gait Training  Ambulation/Gait Training Performed: Yes  Ambulation/Gait Training 1  Surface 1: Level tile  Device 1: Rolling walker  Assistance 1: Minimum assistance  Quality of Gait 1: Decreased step length  Comments/Distance (ft) 1: 2 ft from raised EOB to commode and 2 ft from commode to raised EOB very slowly - pt. incontinent of stool during ambulation - unaware  Transfers  Transfer: Yes  Transfer 1  Transfer From 1: Bed to  Transfer to 1: Stand  Technique 1: Sit to stand, Stand to sit  Transfer Device 1: Walker  Transfer Level of Assistance 1:  (1st attempt from low EOB with FWW: unable despite total assist x 1;  subsequent attempts from raised EOB with FWW: min to modA x 1 depending on fatigue level)  Trials/Comments 1: verbal cues for safe hand placement  Transfers 2  Transfer From 2: Bed to  Transfer to 2: Commode-standard (wide)  Technique 2: Sit to stand, Stand to sit  Transfer Device 2: Walker  Transfer Level of Assistance 2: Minimum assistance (x 1 with increased time and effort)  Trials/Comments 2: pt. incontinent of bowel upon standing and during bed to commode transfer -  unaware.  Very short step lengths and increased time required to complete transfer safely.    Outcome Measures:  [facility] Basic Mobility  Turning from your back to your side while in a flat bed without using bedrails: A lot  Moving from lying on your back to sitting on the side of a flat bed without using bedrails: A lot  Moving to and from bed to chair (including a wheelchair): A lot  Standing up from a chair using your arms (e.g. wheelchair or bedside chair): A lot  To walk in hospital room: A lot  Climbing 3-5 steps with railing: Total  Basic Mobility - Total Score: 11    Education Documentation  Precautions, taught by Yumiko Hall, PT at 2/12/2024  4:39 PM.  Learner: Patient  Readiness: Acceptance  Method: Explanation  Response: Needs Reinforcement, Verbalizes Understanding    Body Mechanics, taught by Yumiko Hall, PT at 2/12/2024  4:39 PM.  Learner: Patient  Readiness: Acceptance  Method: Explanation  Response: Needs Reinforcement, Verbalizes Understanding    Mobility Training, taught by Yumiko Hall, PT at 2/12/2024  4:39 PM.  Learner: Patient  Readiness: Acceptance  Method: Explanation  Response: Needs Reinforcement, Verbalizes Understanding    Education Comments  No comments found.        OP EDUCATION:       Encounter Problems       Encounter Problems (Active)       Mobility       STG - Patient will ambulate 80 feet MOD I with wheeled walker (Progressing)       Start:  02/04/24    Expected End:  02/09/24            STG - Patient will ascend and descend four to six stairs MOD I with B rails (Progressing)       Start:  02/04/24    Expected End:  02/09/24               Pain - Adult          Transfers       STG - Patient to transfer to and from sit to supine independently (Progressing)       Start:  02/04/24    Expected End:  02/09/24            STG - Patient will transfer sit to and from stand MOD I with wheeled walker (Progressing)       Start:  02/04/24    Expected End:  02/09/24

## 2024-02-12 NOTE — NURSING NOTE
Wound care BLE as ordered.  Daphne's legs are edema free but quite dry, dull pink, shallow wounds dry, stable, healing -- wound care can be changed to every other day now, updated orders obtained. Recommend daily moisturizing.

## 2024-02-13 ENCOUNTER — APPOINTMENT (OUTPATIENT)
Dept: RADIOLOGY | Facility: HOSPITAL | Age: 69
DRG: 286 | End: 2024-02-13
Payer: MEDICARE

## 2024-02-13 LAB
ALBUMIN SERPL BCP-MCNC: 2.4 G/DL (ref 3.4–5)
ANION GAP SERPL CALC-SCNC: 11 MMOL/L (ref 10–20)
ANION GAP SERPL CALC-SCNC: 15 MMOL/L (ref 10–20)
BUN SERPL-MCNC: 67 MG/DL (ref 6–23)
BUN SERPL-MCNC: 69 MG/DL (ref 6–23)
CALCIUM SERPL-MCNC: 7.4 MG/DL (ref 8.6–10.3)
CALCIUM SERPL-MCNC: 7.6 MG/DL (ref 8.6–10.3)
CHLORIDE SERPL-SCNC: 100 MMOL/L (ref 98–107)
CHLORIDE SERPL-SCNC: 98 MMOL/L (ref 98–107)
CO2 SERPL-SCNC: 32 MMOL/L (ref 21–32)
CO2 SERPL-SCNC: 32 MMOL/L (ref 21–32)
CREAT SERPL-MCNC: 2.79 MG/DL (ref 0.5–1.05)
CREAT SERPL-MCNC: 2.87 MG/DL (ref 0.5–1.05)
EGFRCR SERPLBLD CKD-EPI 2021: 17 ML/MIN/1.73M*2
EGFRCR SERPLBLD CKD-EPI 2021: 18 ML/MIN/1.73M*2
GLUCOSE BLD MANUAL STRIP-MCNC: 123 MG/DL (ref 74–99)
GLUCOSE BLD MANUAL STRIP-MCNC: 144 MG/DL (ref 74–99)
GLUCOSE BLD MANUAL STRIP-MCNC: 151 MG/DL (ref 74–99)
GLUCOSE BLD MANUAL STRIP-MCNC: 169 MG/DL (ref 74–99)
GLUCOSE BLD MANUAL STRIP-MCNC: 180 MG/DL (ref 74–99)
GLUCOSE SERPL-MCNC: 128 MG/DL (ref 74–99)
GLUCOSE SERPL-MCNC: 211 MG/DL (ref 74–99)
HCT VFR BLD AUTO: 26 % (ref 36–46)
HGB BLD-MCNC: 7.8 G/DL (ref 12–16)
PHOSPHATE SERPL-MCNC: 3.9 MG/DL (ref 2.5–4.9)
POTASSIUM SERPL-SCNC: 3.2 MMOL/L (ref 3.5–5.3)
POTASSIUM SERPL-SCNC: 3.3 MMOL/L (ref 3.5–5.3)
SODIUM SERPL-SCNC: 140 MMOL/L (ref 136–145)
SODIUM SERPL-SCNC: 142 MMOL/L (ref 136–145)

## 2024-02-13 PROCEDURE — 71046 X-RAY EXAM CHEST 2 VIEWS: CPT | Performed by: STUDENT IN AN ORGANIZED HEALTH CARE EDUCATION/TRAINING PROGRAM

## 2024-02-13 PROCEDURE — 2500000004 HC RX 250 GENERAL PHARMACY W/ HCPCS (ALT 636 FOR OP/ED): Performed by: INTERNAL MEDICINE

## 2024-02-13 PROCEDURE — 2500000004 HC RX 250 GENERAL PHARMACY W/ HCPCS (ALT 636 FOR OP/ED): Performed by: FAMILY MEDICINE

## 2024-02-13 PROCEDURE — 2500000002 HC RX 250 W HCPCS SELF ADMINISTERED DRUGS (ALT 637 FOR MEDICARE OP, ALT 636 FOR OP/ED)

## 2024-02-13 PROCEDURE — 80048 BASIC METABOLIC PNL TOTAL CA: CPT | Performed by: INTERNAL MEDICINE

## 2024-02-13 PROCEDURE — 2500000001 HC RX 250 WO HCPCS SELF ADMINISTERED DRUGS (ALT 637 FOR MEDICARE OP)

## 2024-02-13 PROCEDURE — 82947 ASSAY GLUCOSE BLOOD QUANT: CPT

## 2024-02-13 PROCEDURE — 80048 BASIC METABOLIC PNL TOTAL CA: CPT | Mod: CCI | Performed by: INTERNAL MEDICINE

## 2024-02-13 PROCEDURE — 2500000002 HC RX 250 W HCPCS SELF ADMINISTERED DRUGS (ALT 637 FOR MEDICARE OP, ALT 636 FOR OP/ED): Performed by: INTERNAL MEDICINE

## 2024-02-13 PROCEDURE — 2500000004 HC RX 250 GENERAL PHARMACY W/ HCPCS (ALT 636 FOR OP/ED)

## 2024-02-13 PROCEDURE — 36415 COLL VENOUS BLD VENIPUNCTURE: CPT | Performed by: INTERNAL MEDICINE

## 2024-02-13 PROCEDURE — 2500000001 HC RX 250 WO HCPCS SELF ADMINISTERED DRUGS (ALT 637 FOR MEDICARE OP): Performed by: INTERNAL MEDICINE

## 2024-02-13 PROCEDURE — 2060000001 HC INTERMEDIATE ICU ROOM DAILY

## 2024-02-13 PROCEDURE — 71046 X-RAY EXAM CHEST 2 VIEWS: CPT

## 2024-02-13 PROCEDURE — 99233 SBSQ HOSP IP/OBS HIGH 50: CPT | Performed by: INTERNAL MEDICINE

## 2024-02-13 PROCEDURE — 85014 HEMATOCRIT: CPT | Performed by: INTERNAL MEDICINE

## 2024-02-13 PROCEDURE — 2500000005 HC RX 250 GENERAL PHARMACY W/O HCPCS: Performed by: FAMILY MEDICINE

## 2024-02-13 PROCEDURE — 94640 AIRWAY INHALATION TREATMENT: CPT | Mod: MUE

## 2024-02-13 RX ADMIN — INSULIN LISPRO 3 UNITS: 100 INJECTION, SOLUTION INTRAVENOUS; SUBCUTANEOUS at 18:14

## 2024-02-13 RX ADMIN — IPRATROPIUM BROMIDE AND ALBUTEROL SULFATE 3 ML: .5; 3 SOLUTION RESPIRATORY (INHALATION) at 10:08

## 2024-02-13 RX ADMIN — IPRATROPIUM BROMIDE AND ALBUTEROL SULFATE 3 ML: 2.5; .5 SOLUTION RESPIRATORY (INHALATION) at 19:56

## 2024-02-13 RX ADMIN — Medication 2 L/MIN: at 10:08

## 2024-02-13 RX ADMIN — HEPARIN SODIUM 5000 UNITS: 5000 INJECTION INTRAVENOUS; SUBCUTANEOUS at 21:27

## 2024-02-13 RX ADMIN — FERROUS GLUCONATE 324 MG: 324 TABLET ORAL at 09:33

## 2024-02-13 RX ADMIN — HYDROMORPHONE HYDROCHLORIDE 0.4 MG: 1 INJECTION, SOLUTION INTRAMUSCULAR; INTRAVENOUS; SUBCUTANEOUS at 04:04

## 2024-02-13 RX ADMIN — IPRATROPIUM BROMIDE AND ALBUTEROL SULFATE 3 ML: 2.5; .5 SOLUTION RESPIRATORY (INHALATION) at 14:18

## 2024-02-13 RX ADMIN — INSULIN GLARGINE 8 UNITS: 100 INJECTION, SOLUTION SUBCUTANEOUS at 21:27

## 2024-02-13 RX ADMIN — ASPIRIN 81 MG: 81 TABLET, COATED ORAL at 09:32

## 2024-02-13 RX ADMIN — ATORVASTATIN CALCIUM 40 MG: 40 TABLET, FILM COATED ORAL at 09:32

## 2024-02-13 RX ADMIN — PANTOPRAZOLE SODIUM 40 MG: 40 TABLET, DELAYED RELEASE ORAL at 21:23

## 2024-02-13 RX ADMIN — HYDROMORPHONE HYDROCHLORIDE 0.4 MG: 1 INJECTION, SOLUTION INTRAMUSCULAR; INTRAVENOUS; SUBCUTANEOUS at 18:39

## 2024-02-13 RX ADMIN — CLOPIDOGREL 75 MG: 75 TABLET ORAL at 09:33

## 2024-02-13 RX ADMIN — LEVOTHYROXINE SODIUM 200 MCG: 100 TABLET ORAL at 09:38

## 2024-02-13 RX ADMIN — HEPARIN SODIUM 5000 UNITS: 5000 INJECTION INTRAVENOUS; SUBCUTANEOUS at 04:04

## 2024-02-13 RX ADMIN — FUROSEMIDE 80 MG: 10 INJECTION, SOLUTION INTRAMUSCULAR; INTRAVENOUS at 09:38

## 2024-02-13 RX ADMIN — VENLAFAXINE HYDROCHLORIDE 75 MG: 75 CAPSULE, EXTENDED RELEASE ORAL at 09:33

## 2024-02-13 RX ADMIN — HEPARIN SODIUM 5000 UNITS: 5000 INJECTION INTRAVENOUS; SUBCUTANEOUS at 12:23

## 2024-02-13 RX ADMIN — LEVOTHYROXINE SODIUM 50 MCG: 50 TABLET ORAL at 09:39

## 2024-02-13 RX ADMIN — PANTOPRAZOLE SODIUM 40 MG: 40 TABLET, DELAYED RELEASE ORAL at 09:33

## 2024-02-13 RX ADMIN — AMLODIPINE BESYLATE 10 MG: 5 TABLET ORAL at 09:32

## 2024-02-13 RX ADMIN — FUROSEMIDE 80 MG: 10 INJECTION, SOLUTION INTRAMUSCULAR; INTRAVENOUS at 21:26

## 2024-02-13 ASSESSMENT — COGNITIVE AND FUNCTIONAL STATUS - GENERAL
CLIMB 3 TO 5 STEPS WITH RAILING: A LOT
TOILETING: A LITTLE
HELP NEEDED FOR BATHING: A LITTLE
MOVING TO AND FROM BED TO CHAIR: A LOT
WALKING IN HOSPITAL ROOM: A LOT
TURNING FROM BACK TO SIDE WHILE IN FLAT BAD: A LOT
MOBILITY SCORE: 12
PERSONAL GROOMING: A LITTLE
HELP NEEDED FOR BATHING: A LITTLE
STANDING UP FROM CHAIR USING ARMS: A LOT
CLIMB 3 TO 5 STEPS WITH RAILING: A LOT
PERSONAL GROOMING: A LITTLE
MOVING FROM LYING ON BACK TO SITTING ON SIDE OF FLAT BED WITH BEDRAILS: A LOT
DAILY ACTIVITIY SCORE: 16
TOILETING: A LOT
EATING MEALS: A LITTLE
DRESSING REGULAR UPPER BODY CLOTHING: A LITTLE
MOVING FROM LYING ON BACK TO SITTING ON SIDE OF FLAT BED WITH BEDRAILS: A LOT
DAILY ACTIVITIY SCORE: 17
DRESSING REGULAR LOWER BODY CLOTHING: A LOT
WALKING IN HOSPITAL ROOM: A LOT
MOBILITY SCORE: 12
EATING MEALS: A LITTLE
MOVING TO AND FROM BED TO CHAIR: A LOT
STANDING UP FROM CHAIR USING ARMS: A LOT
TURNING FROM BACK TO SIDE WHILE IN FLAT BAD: A LOT
DRESSING REGULAR UPPER BODY CLOTHING: A LITTLE
DRESSING REGULAR LOWER BODY CLOTHING: A LOT

## 2024-02-13 ASSESSMENT — PAIN - FUNCTIONAL ASSESSMENT
PAIN_FUNCTIONAL_ASSESSMENT: 0-10
PAIN_FUNCTIONAL_ASSESSMENT: 0-10

## 2024-02-13 ASSESSMENT — PAIN SCALES - GENERAL
PAINLEVEL_OUTOF10: 0 - NO PAIN
PAINLEVEL_OUTOF10: 0 - NO PAIN
PAINLEVEL_OUTOF10: 9
PAINLEVEL_OUTOF10: 4

## 2024-02-13 ASSESSMENT — PAIN DESCRIPTION - LOCATION: LOCATION: HEAD

## 2024-02-13 NOTE — PROGRESS NOTES
Daphne Morris is a 68 y.o. female on day 11 of admission presenting with CHF (congestive heart failure), NYHA class I, acute on chronic, combined (CMS/HCC).      Subjective   Patient was seen and examined.  Fully awake and alert.  She is getting her breathing treatment.  Serum creatinine slightly improving.  Good urine output.  Not uremic or significantly hypervolemic on exam.  She is eager to leave the hospital    Objective          Vitals 24HR  Heart Rate:  [68-73]   Temp:  [35.9 °C (96.6 °F)-37.1 °C (98.8 °F)]   Resp:  [16-20]   BP: (102-146)/(54-87)   SpO2:  [94 %-100 %]     Intake/Output last 3 Shifts:    Intake/Output Summary (Last 24 hours) at 2/13/2024 1159  Last data filed at 2/13/2024 1043  Gross per 24 hour   Intake 480 ml   Output 1975 ml   Net -1495 ml         Physical Exam        General appearance: no distress awake and alert on room air, euvolemic on exam  Eyes: non-icteric  HEENT: atrumatic head, PEERLA, moist mucosa  Skin: no apparent rash  Heart: NSR, S1, S2 normal, no murmur or gallop  Lungs: Symmetrical expansion,CTA bilat no wheezing/crackles  Abdomen: soft, nt/nd, obese  Extremities: no significant edema bilat  Neuro: No FND,asterixis, no focal deficits noticed        RFP  Recent Labs     02/13/24  0538 02/12/24  0537 02/11/24  0559 02/10/24  0542 02/08/24  0931 02/07/24  0607 02/06/24  0543 02/05/24  0555 02/04/24  0505 02/03/24  0508 02/02/24  0438 02/01/24  1443 02/01/24  0515 07/26/23  1727 07/23/23  0530 05/11/23  1801 05/11/23  1027    140 143 142 140 140 139 139 139 135* 142   < > 140   < > 136   < > 138   K 3.2* 3.7 3.5 3.7 4.1 4.4 4.8 4.5 5.0 5.0 4.9   < > 5.4*   < > 3.6   < > 3.8    101 105 104 102 103 101 103 104 101 106   < > 105   < > 96*   < > 97   CO2 32 29 30 30 31 28 26 28 26 30 31   < > 29   < > 33*   < > 31   BUN 69* 72* 75* 79* 74* 73* 68* 61* 56* 50* 47*   < > 49*   < > 46*   < > 38*   CREATININE 2.87* 3.03* 3.27* 3.41* 3.58* 3.64* 3.67* 3.67* 3.49* 3.28*  3.21*   < > 3.25*   < > 2.90*   < > 2.2*   GLUCOSE 128* 244* 46* 121* 120* 155* 217* 181* 189* 190* 183*   < > 220*   < > 98   < > 272*   CALCIUM 7.4* 7.3* 7.3* 7.3* 7.4* 7.4* 7.5* 7.6* 7.3* 7.2* 7.5*   < > 7.5*   < > 8.1*   < > 8.4*   ALBUMIN  --  2.3*  --   --   --   --  2.5* 2.4* 2.2* 2.0* 2.4*  --  2.6*  --  2.6*   < > 2.8*   PHOS  --   --   --   --   --   --  6.3* 6.1* 6.0* 5.8* 6.0*  --   --   --  4.2  --  4.2   EGFR 17* 16* 15* 14* 13* 13* 13* 13* 14* 15* 15*   < > 15*   < >  --    < > 24   ANIONGAP 11 14 12 12 11 13 17 13 14 9* 10   < > 11   < > 11   < > 10    < > = values in this interval not displayed.          Urineanalysis  Recent Labs     02/09/24  1248 02/01/24  0515 07/19/23  1437 04/26/23  1740 05/03/21  2035 04/23/21  0310 04/06/21  1200 04/10/18  1415   COLORU Yellow Yellow STRAW YELLOW YELLOW YELLOW PALE YELLOW YELLOW   APPEARANCEU Clear Clear CLEAR CLEAR CLEAR HAZY HAZY CLOUDY   SPECGRAVU 1.011 1.013 1.008 1.009 1.012 1.020 1.005 1.017   SYLVIE 5.0 5.0 6.0 6.0 6.5 6.0 6.5 5.5   PROTUR >=500 (3+)* >=500 (3+)* >=500 (3+)* 100(2+)* 300* 600* 100* 30*   GLUCOSEU 150 (2+)* >=500 (3+)* 150 (2+)* 50(1+)* 100* 150* 50* 300*   BLOODU MODERATE (2+)* NEGATIVE LARGE (3+)* SMALL(1+)* TRACE* TRACE* MODERATE* TRACE*   KETONESU NEGATIVE NEGATIVE NEGATIVE NEGATIVE NEGATIVE NEGATIVE NEGATIVE NEGATIVE   BILIRUBINU NEGATIVE NEGATIVE NEGATIVE NEGATIVE NEGATIVE NEGATIVE NEGATIVE NEGATIVE   NITRITEU NEGATIVE NEGATIVE NEGATIVE NEGATIVE NEGATIVE NEGATIVE POSITIVE* NEGATIVE   LEUKOCYTESU TRACE* NEGATIVE NEGATIVE NEGATIVE NEGATIVE SMALL* LARGE* LARGE*         Urine Electrolytes  Recent Labs     02/09/24  1248 02/01/24  0515 07/19/23  1437 04/30/23  1316 04/26/23  1740 05/03/21  2035 04/23/21  0310 04/06/21  1200 04/10/18  1415   SODIUMURR  --  40  --  27  --   --   --   --   --    NACREATUR  --  75  --  27  --   --   --   --   --    KUR  --  64  --   --   --   --   --   --   --    KCREATUR  --  119  --   --   --   --   --    --   --    CREATU  --  53.6  53.6  --  101.0  --   --   --   --   --    PROTUR >=500 (3+)* >=500 (3+)* >=500 (3+)*  --  100(2+)* 300* 600* 100* 30*   ALBUMINUR  --  >2,250.0  --   --   --   --   --   --   --           Urine Micro  Recent Labs     02/09/24  1248 02/01/24  0515 07/19/23  1437 04/26/23  1740 05/03/21  2035 04/23/21  0310 04/06/21  1200 04/10/18  1415   WBCU 6-10* 1-5 5* 1 NONE SEEN 53* LOADED 1,077*   RBCU 11-20* 3-5 >182* 1 2-4 5* 16-25 5*   HYALCASTU 2+* OCCASIONAL*  --   --   --  70  --  3   SQUAMEPIU 1-9 (SPARSE)  --  1 1  --  MODERATE  --  FEW   BACTERIAU 1+* 1+* 1+*  --   --  POSITIVE PRESENT  Performed at [facility] [address] [place] [address]   POSITIVE   MUCUSU FEW FEW FEW  --   --   --   --   --           Iron  Recent Labs     05/02/23  0607 04/28/23  0615 04/27/23  0624 02/23/23  1113   IRON  --   --  26* 54   TIBC  --   --  289 281   IRONSAT  --   --  9* 19.2   FERRITIN 71 80  --  42         [Held by provider] amiodarone, 200 mg, oral, Daily with breakfast  amLODIPine, 10 mg, oral, Daily  aspirin, 81 mg, oral, Daily  atorvastatin, 40 mg, oral, Daily  bisacodyl, 10 mg, rectal, Daily  clopidogrel, 75 mg, oral, Daily  dextrose, 25 g, intravenous, Once  ferrous gluconate, 324 mg, oral, Daily with breakfast  furosemide, 80 mg, intravenous, q12h  heparin (porcine), 5,000 Units, subcutaneous, q8h  insulin glargine, 8 Units, subcutaneous, Nightly  insulin lispro, 0-15 Units, subcutaneous, TID with meals  ipratropium-albuteroL, 3 mL, nebulization, TID  lactulose, 20 g, oral, Daily  lactulose, 20 g, oral, Daily  levothyroxine, 200 mcg, oral, Daily before breakfast  levothyroxine, 50 mcg, oral, Daily before breakfast  [Held by provider] metoprolol succinate XL, 50 mg, oral, BID  pantoprazole, 40 mg, oral, BID  [Held by provider] potassium chloride CR, 20 mEq, oral, Daily  [Held by provider] SITagliptin phosphate, 25 mg, oral, Daily  venlafaxine XR, 75 mg, oral, Daily              Assessment/Plan                Principal Problem:    CHF (congestive heart failure), NYHA class I, acute on chronic, combined (CMS/[facility])           Daphne Morris  is a 68 y.o. female who has past medical history of  heart failure, atrial fibrillation s/p Watchman, right nephrectomy, hypertension, type 2 DM admitted for acute hypoxic respiratory failure 2/2 to pneumonia and acute decompensated heart failure.        # ANDREA  -Likely  Cardiorenal syndrome 2/2 Acute decompensated CHF  -(CKD) 4/A3-baseline SCr 1.8-2.5 most likely atherosclerotic cardiovascular disease/diabetic nephropathy, prior nephrectomy.  She follows with Dr. Mitchell  - Most recent Scr during this admission 3-3.2, GFR 15 and has been plateauing around this level  - Urine analysis showed 3+ protien in urine, negative blood, 3-5 RBCs, 1-5 WBCs  -Kidney ultrasound with no obstruction on the left side.  Right total nephrectomy noticed  -Currently on Lasix 80 mg IV twice daily with good urine output and within normal electrolytes  -Urine lites with no evidence for prerenal injury     Plan  -No indication to immediately initiate dialysis however she might need in the future.  Currently kidney function is poor stable and within normal electrolytes  -Consider transitioning to p.o. diuretics.  I suggest Lasix 80 mg p.o. twice daily to start with (this is equal to half what she is getting currently 80 mg IV twice daily).  Will defer diuretic management to cardiology team  - strict I/Os, daily weight  - avoid contrast if possible  - would recommend starting an SGLT2 inhibitor outpatient if GFR returns to >25         Okay to discharge from kidney standpoint.  I informed her primary nephrologist Dr. Mitchell about this admission and needs to follow-up with him.  Patient is aware too    John Pena MD

## 2024-02-13 NOTE — PROGRESS NOTES
Daphne Morris is a 68 y.o. female on day 11 of admission presenting with CHF (congestive heart failure), NYHA class I, acute on chronic, combined (CMS/HCC).      Subjective   No acute events overnight. Pt denies any new or acute concerns this morning.  She feels well and is agreeable to DC to SNF       Objective     Last Recorded Vitals  /54 (BP Location: Left arm, Patient Position: Lying)   Pulse 71   Temp 35.9 °C (96.6 °F) (Temporal)   Resp 20   Wt 99.8 kg (220 lb)   SpO2 95%   Intake/Output last 3 Shifts:    Intake/Output Summary (Last 24 hours) at 2/13/2024 0842  Last data filed at 2/13/2024 0411  Gross per 24 hour   Intake 480 ml   Output 1825 ml   Net -1345 ml         Admission Weight  Weight: 99.8 kg (220 lb 0.3 oz) (02/02/24 0404)    Daily Weight  02/12/24 : 99.8 kg (220 lb)    Image Results  XR chest 1 view  Narrative: Interpreted By:  Sukumar Daugherty,   STUDY:  XR CHEST 1 VIEW;  2/9/2024 1:13 pm      INDICATION:  Signs/Symptoms:fever.      COMPARISON:  02/02/2024      ACCESSION NUMBER(S):  [id number]      ORDERING CLINICIAN:  TONIA MONTANA      FINDINGS:  Significantly increased large right pleural effusion with overlying  atelectasis. Left lung grossly clear. Cardiomediastinal silhouette  unchanged. Aortic atherosclerosis. Pulmonary vascular congestion.      Impression: Large right pleural effusion, significantly increased compared to 1  week ago.          Signed by: Sukumar Daugherty 2/9/2024 3:56 PM  Dictation workstation:   [id number]    No current facility-administered medications on file prior to encounter.     Current Outpatient Medications on File Prior to Encounter   Medication Sig Dispense Refill    albuterol 90 mcg/actuation inhaler Inhale 2 puffs every 4 hours if needed.      amiodarone (Pacerone) 200 mg tablet Take 1 tablet (200 mg) by mouth once daily with a meal.      amLODIPine (Norvasc) 5 mg tablet Take 1 tablet (5 mg) by mouth once daily.      aspirin 81 mg EC tablet Take 1 tablet  "(81 mg) by mouth once daily.      atorvastatin (Lipitor) 40 mg tablet Take 1 tablet (40 mg) by mouth once daily.      BD Calista 2nd Gen Pen Needle 32 gauge x 5/32\" needle USE SUBCUTANEOUSLY AS DIRECTED TWICE DAILY      clopidogrel (Plavix) 75 mg tablet Take 1 tablet (75 mg) by mouth once daily.      ferrous gluconate 324 (38 Fe) MG tablet Take 1 tablet (324 mg) by mouth once daily with breakfast. 30 tablet 3    FreeStyle Shakir 14 Day Sensor kit USE AS DIRECTED TO CHECK SUGARS 3 TO 4 TIMES DAILY      FreeStyle Lite Strips strip twice a day.      HumaLOG KwikPen Insulin 100 unit/mL injection up to 15 units Subcutaneous three times a day per sliding scale      ipratropium-albuteroL (Duo-Neb) 0.5-2.5 mg/3 mL nebulizer solution Take 3 mL by nebulization every 6 hours if needed.      Lantus Solostar U-100 Insulin 100 unit/mL (3 mL) pen Inject 14 Units under the skin once daily at bedtime.      levothyroxine (Synthroid, Levoxyl) 200 mcg tablet Take 1 tablet (200 mcg) by mouth once daily in the morning. Take before meals. 90 tablet 3    levothyroxine (Synthroid, Levoxyl) 50 mcg tablet Take 1 tablet (50 mcg) by mouth once daily in the morning. Take before meals.      metoprolol succinate XL (Toprol-XL) 50 mg 24 hr tablet TAKE ONE TABLET BY MOUTH TWO TIMES A  tablet 1    oxygen (O2) gas therapy 2 l/min nasal cannula      pantoprazole (ProtoNix) 40 mg EC tablet Take 1 tablet (40 mg) by mouth 2 times a day. 60 tablet 6    potassium chloride CR 20 mEq ER tablet TAKE ONE TABLET BY MOUTH EVERY DAY WITH FOOD 90 tablet 1    SITagliptin phosphate (Januvia) 100 mg tablet Take 1 tablet (100 mg) by mouth once daily.      venlafaxine XR (Effexor-XR) 75 mg 24 hr capsule Take 1 capsule (75 mg) by mouth once daily.      [DISCONTINUED] Soaanz 60 mg tablet Take 1 tablet by mouth once daily. 30 tablet 3      Results for orders placed or performed during the hospital encounter of 02/02/24 (from the past 24 hour(s))   POCT GLUCOSE   Result " Value Ref Range    POCT Glucose 200 (H) 74 - 99 mg/dL   POCT GLUCOSE   Result Value Ref Range    POCT Glucose 231 (H) 74 - 99 mg/dL   POCT GLUCOSE   Result Value Ref Range    POCT Glucose 220 (H) 74 - 99 mg/dL   Basic Metabolic Panel   Result Value Ref Range    Glucose 128 (H) 74 - 99 mg/dL    Sodium 140 136 - 145 mmol/L    Potassium 3.2 (L) 3.5 - 5.3 mmol/L    Chloride 100 98 - 107 mmol/L    Bicarbonate 32 21 - 32 mmol/L    Anion Gap 11 10 - 20 mmol/L    Urea Nitrogen 69 (H) 6 - 23 mg/dL    Creatinine 2.87 (H) 0.50 - 1.05 mg/dL    eGFR 17 (L) >60 mL/min/1.73m*2    Calcium 7.4 (L) 8.6 - 10.3 mg/dL   Hemoglobin and Hematocrit, Blood   Result Value Ref Range    Hemoglobin 7.8 (L) 12.0 - 16.0 g/dL    Hematocrit 26.0 (L) 36.0 - 46.0 %   POCT GLUCOSE   Result Value Ref Range    POCT Glucose 123 (H) 74 - 99 mg/dL        Physical Exam  Constitutional:       Appearance: She is not ill-appearing.      Comments: Chronically ill appearing. nad   HENT:      Head: Normocephalic.   Eyes:      Extraocular Movements: Extraocular movements intact.   Cardiovascular:      Rate and Rhythm: Normal rate and regular rhythm.      Heart sounds: No murmur heard.     Comments: Regular, with ectopy. 1/6 systolic murmur  Pulmonary:      Comments: Good inspiratory effort. Bilateral basilar crackles, but decreased breath sounds right base  Abdominal:      General: Bowel sounds are normal.      Palpations: Abdomen is soft.   Genitourinary:     Comments: vega  Musculoskeletal:      Comments: Dressings on LLE wounds.   Edema improved, trace-1+ above dressings now. Faint distal pulses   Skin:     General: Skin is dry.   Neurological:      Mental Status: She is alert and oriented to person, place, and time.      Motor: Weakness present.   Psychiatric:         Mood and Affect: Mood normal.         Behavior: Behavior normal.         Relevant Results       Assessment/Plan        This patient has a urinary catheter   Reason for the urinary catheter  remaining today? urinary retention/bladder outlet obstruction, acute or chronic      Principal Problem:    CHF (congestive heart failure), NYHA class I, acute on chronic, combined (CMS/[facility])    Ms Morris has had an extended stay for decompensated heart failure with hypoxia, with andrea on ckd, pleural effusions, hypoglycemia now off and on over weekend.   Acute decompensated heart failure, combined. Placed on high dose lasix several days ago with fair response. 1600 over last 24 hours. Also with pericardial effusion on echo. CXR on 9th showed marked enlargement of effusion, will recheck cxr tomorrow  ANDREA, on ckd. Creatinine down-trending today. Still on high dose lasix. Nephro consulted, recommends transition to PO lasix as soon as cardiology feels it is appropriate.  PAF. Periods of bradycardia noted, cards aware.  DM. Monitor BG on accuchecks; cont SSI  Hypothyroid, stable on supplement  Pleural effusion, as above. Did have thora, over 2 liters removed at that time  COPD, no exacerbation at this time  Hypoalbuminemia    Dispo: agreeable to SNF; will communicate with [facility] and get it set up            J Carlos Garcia, DO

## 2024-02-13 NOTE — PROGRESS NOTES
Daphne Morris is a 68 y.o. female on day 11 of admission presenting with CHF (congestive heart failure), NYHA class I, acute on chronic, combined (CMS/HCC).      Subjective   She is sitting up in the chair, states she feels better today. Still with edema to arms and legs.       Review of systems:  Constitutional: negative for fever, chills, or malaise  Neuro: negative for dizziness, headache, numbness, tingling  ENT: Negative for nasal congestion or sore throat  CV: negative for chest pain, palpitations  GI: negative for abd pain, nausea, vomiting or diarrhea  : negative for dysuria, frequency, or urgency  Musculoskeletal: Negative for weakness, myalgia, or arthralgia  Endocrine: Negative for polyuria or polydipsia         Objective   Constitutional: Well developed, awake/alert/oriented x3, no distress, alert and cooperative  Eyes: PERRL, EOMI, clear sclera  ENMT: mucous membranes moist, no apparent injury, no lesions seen  Head/Neck: Neck supple, no apparent injury, thyroid without mass or tenderness, No JVD, trachea midline, no bruits  Respiratory/Thorax: Patent airways, CTAB, diminished and crackles in bases, breath sounds with good chest expansion, thorax symmetric  Cardiovascular: Regular, rate and rhythm, no murmurs, 2+ equal pulses of the extremities, normal S 1and S 2  Gastrointestinal: Nondistended, soft, non-tender, no rebound tenderness or guarding, no masses palpable, no organomegaly, +BS, no bruits  Musculoskeletal: ROM intact, no joint swelling, normal strength  Extremities:  2+ pitting edema BUE and lower extremities  Neurological: alert and oriented x3, intact senses, motor, response and reflexes, normal strength  Lymphatic: No significant lymphadenopathy  Psychological: Appropriate mood and behavior  Skin: Warm and dry, no lesions, no rashes      Last Recorded Vitals  /73 (BP Location: Right arm, Patient Position: Sitting)   Pulse 72   Temp 36.6 °C (97.9 °F) (Temporal)   Resp 18   Ht  "1.676 m (5' 6\")   Wt 99.8 kg (220 lb)   SpO2 98%   BMI 35.51 kg/m²     Intake/Output last 3 Shifts:  I/O last 3 completed shifts:  In: 720 (7.2 mL/kg) [P.O.:720]  Out: 2425 (24.3 mL/kg) [Urine:2425 (0.7 mL/kg/hr)]  Weight: 99.8 kg   I/O this shift:  In: 240 [P.O.:240]  Out: 400 [Urine:400]    Relevant Results  Scheduled medications  [Held by provider] amiodarone, 200 mg, oral, Daily with breakfast  amLODIPine, 10 mg, oral, Daily  aspirin, 81 mg, oral, Daily  atorvastatin, 40 mg, oral, Daily  bisacodyl, 10 mg, rectal, Daily  clopidogrel, 75 mg, oral, Daily  dextrose, 25 g, intravenous, Once  ferrous gluconate, 324 mg, oral, Daily with breakfast  furosemide, 80 mg, intravenous, q12h  heparin (porcine), 5,000 Units, subcutaneous, q8h  insulin glargine, 8 Units, subcutaneous, Nightly  insulin lispro, 0-15 Units, subcutaneous, TID with meals  ipratropium-albuteroL, 3 mL, nebulization, TID  lactulose, 20 g, oral, Daily  lactulose, 20 g, oral, Daily  levothyroxine, 200 mcg, oral, Daily before breakfast  levothyroxine, 50 mcg, oral, Daily before breakfast  [Held by provider] metoprolol succinate XL, 50 mg, oral, BID  pantoprazole, 40 mg, oral, BID  [Held by provider] potassium chloride CR, 20 mEq, oral, Daily  [Held by provider] SITagliptin phosphate, 25 mg, oral, Daily  venlafaxine XR, 75 mg, oral, Daily      Continuous medications     PRN medications  PRN medications: acetaminophen, dextrose 10 % in water (D10W), dextrose 10 % in water (D10W), dextrose, dextrose, glucagon, glucagon, HYDROmorphone, ipratropium-albuteroL, melatonin, oxygen, oxygen, oxygen    Results for orders placed or performed during the hospital encounter of 02/02/24 (from the past 24 hour(s))   POCT GLUCOSE   Result Value Ref Range    POCT Glucose 231 (H) 74 - 99 mg/dL   POCT GLUCOSE   Result Value Ref Range    POCT Glucose 220 (H) 74 - 99 mg/dL   Basic Metabolic Panel   Result Value Ref Range    Glucose 128 (H) 74 - 99 mg/dL    Sodium 140 136 - 145 " mmol/L    Potassium 3.2 (L) 3.5 - 5.3 mmol/L    Chloride 100 98 - 107 mmol/L    Bicarbonate 32 21 - 32 mmol/L    Anion Gap 11 10 - 20 mmol/L    Urea Nitrogen 69 (H) 6 - 23 mg/dL    Creatinine 2.87 (H) 0.50 - 1.05 mg/dL    eGFR 17 (L) >60 mL/min/1.73m*2    Calcium 7.4 (L) 8.6 - 10.3 mg/dL   Hemoglobin and Hematocrit, Blood   Result Value Ref Range    Hemoglobin 7.8 (L) 12.0 - 16.0 g/dL    Hematocrit 26.0 (L) 36.0 - 46.0 %   POCT GLUCOSE   Result Value Ref Range    POCT Glucose 123 (H) 74 - 99 mg/dL   POCT GLUCOSE   Result Value Ref Range    POCT Glucose 144 (H) 74 - 99 mg/dL   POCT GLUCOSE   Result Value Ref Range    POCT Glucose 151 (H) 74 - 99 mg/dL       XR chest 1 view   Final Result   Large right pleural effusion, significantly increased compared to 1   week ago.             Signed by: Sukumar Daugherty 2/9/2024 3:56 PM   Dictation workstation:   [id number]      Cardiac catheterization - non-coronary   Final Result      Transthoracic Echo (TTE) Complete   Final Result      XR chest 1 view   Final Result   Marked interval improvement in the right pleural effusion with   interval thoracentesis. No pneumothorax.        Pulmonary vascular congestion        Minimal atelectasis right lung base        MACRO:   None.        Signed by: Rayna Carson 2/2/2024 3:23 PM   Dictation workstation:   [id number]      XR knee left 4+ views   Final Result   1. Small joint effusion and degenerative change of the knee without   osseous injury evident. Knowledge of any trauma may be helpful. If   there is persistent concern for osseous injury, cross-sectional   imaging can be considered.   2. Reticular increased density in the subcutaneous tissues which may   represent lymphedema and/or cellulitis.        MACRO:   None        Signed by: Adama Jonas 2/2/2024 4:24 PM   Dictation workstation:   [id number]      US renal complete   Final Result   No left hydronephrosis. Right total nephrectomy.        MACRO:   None        Signed by:  Amando Starks 2/2/2024 2:36 PM   Dictation workstation:   [id number]      XR chest 2 views    (Results Pending)       Transthoracic Echo (TTE) Complete    Result Date: 2/5/2024   [facility], [address] [place] [address]               Tel [phone] and Fax [phone] TRANSTHORACIC ECHOCARDIOGRAM REPORT  Patient Name:      MOE BABINGLENDEMETRIA      Reading Physician:    [id number] Nicholas Antonio MD Study Date:        2/5/2024             Ordering Provider:    [id number] DOMONIQUE PURCELL MRN/PID:           [id number]             Fellow: Accession#:        [id number]         Nurse: Date of Birth/Age: 1955 / 68 years Sonographer:          Nina Angel                                                               NALLELY Gender:            F                    Additional Staff: Height:            167.64 cm            Admit Date:           2/2/2024 Weight:            102.51 kg            Admission Status:     Inpatient -                                                               Routine BSA:               2.11 m2              Encounter#:           [id number]                                         Department Location:  [facility] Non                                                               Invasive Blood Pressure: 147 /65 mmHg Study Type:    TRANSTHORACIC ECHO (TTE) COMPLETE Diagnosis/ICD: Acute combined systolic (congestive) and diastolic (congestive)                heart failure (CHF)-I50.41 Indication:    Congestive Heart Failure CPT Code:      Echo Complete w Full [id number] Patient History: Diabetes:         Yes Pertinent         A-Fib, HTN, Dyspnea and LE Edema. Watchman device, elevated History:          BNP,. Study Detail: The following Echo studies were performed: 2D, M-Mode, Doppler and               color flow.  PHYSICIAN INTERPRETATION: Left  Ventricle: The left ventricular systolic function is normal, with an estimated ejection fraction of 60-65%. There are no regional wall motion abnormalities. The left ventricular cavity size is normal. Spectral Doppler shows an impaired relaxation pattern of left ventricular diastolic filling. Left Atrium: The left atrium is moderately dilated. Right Ventricle: The right ventricle is mildly enlarged. There is mildly reduced right ventricular systolic function. Right Atrium: The right atrium is moderately dilated. Aortic Valve: The aortic valve is trileaflet. There is mild aortic valve cusp calcification. There is trivial aortic valve regurgitation. The peak instantaneous gradient of the aortic valve is 14.1 mmHg. The mean gradient of the aortic valve is 9.1 mmHg. Mitral Valve: The mitral valve is normal in structure. There is mild mitral annular calcification. There is mild to moderate mitral valve regurgitation. Tricuspid Valve: The tricuspid valve is structurally normal. There is moderate tricuspid regurgitation. Pulmonic Valve: The pulmonic valve is structurally normal. There is mild pulmonic valve regurgitation. Pericardium: There is a small to moderate pericardial effusion posterior to the left ventricle. There is no evidence of cardiac tamponade. Aorta: The aortic root is normal. Pulmonary Artery: The tricuspid regurgitant velocity is 3.04 m/s, and with an estimated right atrial pressure of 3 mmHg, the estimated pulmonary artery pressure is mildly elevated with the RVSP at 39.9 mmHg. Pulmonary Veins: The pulmonary veins appear normal and return normally to the left atrium. Systemic Veins: The inferior vena cava appears to be of normal size. There is IVC inspiratory collapse greater than 50%.  CONCLUSIONS:  1. Left ventricular systolic function is normal with a 60-65% estimated ejection fraction.  2. Spectral Doppler shows an impaired relaxation pattern of left ventricular diastolic filling.  3. There is mildly  reduced right ventricular systolic function.  4. The left atrium is moderately dilated.  5. The right atrium is moderately dilated.  6. There is a small to moderate pericardial effusion.  7. There is no evidence of cardiac tamponade.  8. Mild to moderate mitral valve regurgitation.  9. Moderate tricuspid regurgitation visualized. 10. Normal CVP. Estimated PASP around 45 mm Hg. QUANTITATIVE DATA SUMMARY: 2D MEASUREMENTS:                           Normal Ranges: IVSd:          1.52 cm    (0.6-1.1cm) LVPWd:         1.42 cm    (0.6-1.1cm) LVIDd:         4.35 cm    (3.9-5.9cm) LVIDs:         2.94 cm LV Mass Index: 120.7 g/m2 LV % FS        32.3 % LA VOLUME:                              Normal Ranges: LA Vol A4C:       79.5 ml    (22+/-6mL/m2) LA Vol A2C:       78.2 ml LA Vol BP:        79.7 ml LA Vol Index A4C: 37.7ml/m2 LA Vol Index A2C: 37.1 ml/m2 LA Vol Index BP:  37.9 ml/m2 LA Volume Index:  37.8 ml/m2 LA Vol A4C:       76.0 ml LA Vol A2C:       73.9 ml RA VOLUME BY A/L METHOD:                       Normal Ranges: RA Area A4C: 17.8 cm2 M-MODE MEASUREMENTS:                  Normal Ranges: Ao Root: 3.00 cm (2.0-3.7cm) LAs:     5.08 cm (2.7-4.0cm) LV SYSTOLIC FUNCTION BY 2D PLANIMETRY (MOD):                     Normal Ranges: EF-A4C View: 63.4 % (>=55%) EF-A2C View: 63.8 % EF-Biplane:  62.5 % LV DIASTOLIC FUNCTION:                             Normal Ranges: MV Peak E:      1.33 m/s    (0.7-1.2 m/s) MV Peak A:      0.53 m/s    (0.42-0.7 m/s) E/A Ratio:      2.52        (1.0-2.2) MV e'           0.06 m/s    (>8.0) MV lateral e'   0.06 m/s MV medial e'    0.06 m/s MV A Dur:       110.73 msec E/e' Ratio:     22.09       (<8.0) PulmV Sys Mike:  53.97 cm/s PulmV Dugan Mike: 77.63 cm/s PulmV S/D Mike:  0.70 MITRAL VALVE:                 Normal Ranges: MV DT: 245 msec (150-240msec) MITRAL INSUFFICIENCY:                         Normal Ranges: MR VTI:     171.38 cm MR Vmax:    489.73 cm/s MR Volume:  20.07 ml MR Flow Rt: 57.36 ml/s  MR EROA:    0.12 cm2 AORTIC VALVE:                                    Normal Ranges: AoV Vmax:                1.88 m/s  (<=1.7m/s) AoV Peak P.1 mmHg (<20mmHg) AoV Mean P.1 mmHg  (1.7-11.5mmHg) LVOT Max Mike:            0.97 m/s  (<=1.1m/s) AoV VTI:                 46.22 cm  (18-25cm) LVOT VTI:                24.89 cm LVOT Diameter:           1.96 cm   (1.8-2.4cm) AoV Area, VTI:           1.62 cm2  (2.5-5.5cm2) AoV Area,Vmax:           1.56 cm2  (2.5-4.5cm2) AoV Dimensionless Index: 0.54  RIGHT VENTRICLE: RV Basal 3.80 cm RV Mid   2.80 cm RV Major 8.0 cm TAPSE:   18.0 mm RV s'    0.13 m/s TRICUSPID VALVE/RVSP:                             Normal Ranges: Peak TR Velocity: 3.04 m/s RV Syst Pressure: 39.9 mmHg (< 30mmHg) PULMONIC VALVE:                      Normal Ranges: PV Max Mike: 1.0 m/s  (0.6-0.9m/s) PV Max PG:  3.9 mmHg Pulmonary Veins: PulmV Dugan Mike: 77.63 cm/s PulmV S/D Mike:  0.70 PulmV Sys Mike:  53.97 cm/s  [id number] Nicholas Antonio MD Electronically signed on 2024 at 5:06:05 PM  ** Final **           Assessment/Plan   Principal Problem:    CHF (congestive heart failure), NYHA class I, acute on chronic, combined (CMS/LTAC, located within [facility])    Echocardiogram from 2023 (post watchman) reviewed: LVEF 55%, small to moderate pericardial effusions         1. Acute on chronic hypoxic/hypercapnic respiratory failure 2/2 acute on chronic diastolic CHF and pneumonia, COPD exac, Pleural effusion  -CXR showed large right pleural effusion  -CTA of the chest showed large right pleural effusion with complete collapse of the right lower lung, pneumonia, a small pericardial effusion, and soft tissue edema at the chest and abd wall.   -bronchodilators  -Steroids  -oxygen support  -Still with ignificant leg to abd swelling and shortness of breath  -Strict I & Os  -Daily weights  -2gm na diet  -Repeat echo as above  -Unable to start SGLT2 inhibitors with current GFR  -Pulmonary following, s/p  thoracentesis 2.3L  -s/p RHC showed MEAN PA 30 MILD PULMONARY HTN MEAN RA 9 MM HG MEAN PCWP 23 MM HG CARDIAC OUTPUT 8.03 CI 3.79 NO SHUNTS   -cont IV lasix 80mg BID  -Monitor on tele     2. Elevated troponins-non MI elevation  -Denies chest pain  -Does have shortness of breath-worsening for weeks  -Had a LHC planned ->unable to lay flat  -Cont asa, plavix, statin, metoprolol  -Unable to do LHC due to Creatinine 3.6  -Monitor on tele     3. Paroxysmal atrial fibrillation with RVR  -s/p Watchman in Aug 2023  -Currently SB  -Cont amiodarone and metoprolol for rate control  -Monitor on tele     4. Hypertension  -stable  -2gm na diet  -cont meds  -monitor     5. Anemia  -Appear stable     6. Acute on Chronic kidney disease, improving  -s/p nephrectomy  -Renal consulted, note reviewed increased lasix to 80mg BID  -Renal US negative  -Monitor closely on Lasix IV    7. Diabetes  -ISS  -Accuchecks     8. Hypothyroidism  -Cont synthroid     9. Pericardial effusion  -Mild to moderate on echo  -Cont diuresis      CARLENE Villegas-CNP

## 2024-02-13 NOTE — PROGRESS NOTES
"Physical Therapy                 Therapy Communication Note    Patient Name: Daphne Morris  MRN: [id number]  Today's Date: 2/13/2024     Discipline: Physical Therapy    Missed Visit Reason: Missed Visit Reason: Patient refused (patietn refused stating \"I just got back into bed, I get up everyday, not alot of walking but I am getting up. I hope to be leaving[place] soon for [place]\" BEATRICE wilkinson aware)    Missed Time: Attempt 1550    Comment:  "

## 2024-02-13 NOTE — CARE PLAN
The patient's goals for the shift include  be discharged home.    The clinical goals for the shift include Patient will remain HDS through out the shift.    Over the shift, the patient did not make progress toward the following goals. Barriers to progression include acute illness, weakness. Recommendations to address these barriers include physical therapy, medications..

## 2024-02-14 LAB
ERYTHROCYTE [DISTWIDTH] IN BLOOD BY AUTOMATED COUNT: 13.7 % (ref 11.5–14.5)
GLUCOSE BLD MANUAL STRIP-MCNC: 150 MG/DL (ref 74–99)
GLUCOSE BLD MANUAL STRIP-MCNC: 150 MG/DL (ref 74–99)
GLUCOSE BLD MANUAL STRIP-MCNC: 168 MG/DL (ref 74–99)
GLUCOSE BLD MANUAL STRIP-MCNC: 178 MG/DL (ref 74–99)
HCT VFR BLD AUTO: 25.6 % (ref 36–46)
HGB BLD-MCNC: 7.7 G/DL (ref 12–16)
MCH RBC QN AUTO: 29.7 PG (ref 26–34)
MCHC RBC AUTO-ENTMCNC: 30.1 G/DL (ref 32–36)
MCV RBC AUTO: 99 FL (ref 80–100)
NRBC BLD-RTO: 0 /100 WBCS (ref 0–0)
PLATELET # BLD AUTO: 225 X10*3/UL (ref 150–450)
RBC # BLD AUTO: 2.59 X10*6/UL (ref 4–5.2)
SARS-COV-2 RNA RESP QL NAA+PROBE: NOT DETECTED
WBC # BLD AUTO: 6.7 X10*3/UL (ref 4.4–11.3)

## 2024-02-14 PROCEDURE — 2500000004 HC RX 250 GENERAL PHARMACY W/ HCPCS (ALT 636 FOR OP/ED): Performed by: FAMILY MEDICINE

## 2024-02-14 PROCEDURE — 2500000004 HC RX 250 GENERAL PHARMACY W/ HCPCS (ALT 636 FOR OP/ED): Performed by: INTERNAL MEDICINE

## 2024-02-14 PROCEDURE — 82947 ASSAY GLUCOSE BLOOD QUANT: CPT

## 2024-02-14 PROCEDURE — 2500000001 HC RX 250 WO HCPCS SELF ADMINISTERED DRUGS (ALT 637 FOR MEDICARE OP): Performed by: INTERNAL MEDICINE

## 2024-02-14 PROCEDURE — 87635 SARS-COV-2 COVID-19 AMP PRB: CPT | Performed by: INTERNAL MEDICINE

## 2024-02-14 PROCEDURE — 99232 SBSQ HOSP IP/OBS MODERATE 35: CPT | Performed by: INTERNAL MEDICINE

## 2024-02-14 PROCEDURE — 2500000004 HC RX 250 GENERAL PHARMACY W/ HCPCS (ALT 636 FOR OP/ED)

## 2024-02-14 PROCEDURE — 36415 COLL VENOUS BLD VENIPUNCTURE: CPT | Performed by: INTERNAL MEDICINE

## 2024-02-14 PROCEDURE — 2500000002 HC RX 250 W HCPCS SELF ADMINISTERED DRUGS (ALT 637 FOR MEDICARE OP, ALT 636 FOR OP/ED)

## 2024-02-14 PROCEDURE — 2500000002 HC RX 250 W HCPCS SELF ADMINISTERED DRUGS (ALT 637 FOR MEDICARE OP, ALT 636 FOR OP/ED): Performed by: INTERNAL MEDICINE

## 2024-02-14 PROCEDURE — 94640 AIRWAY INHALATION TREATMENT: CPT | Mod: MUE

## 2024-02-14 PROCEDURE — 85027 COMPLETE CBC AUTOMATED: CPT | Performed by: INTERNAL MEDICINE

## 2024-02-14 PROCEDURE — 2500000001 HC RX 250 WO HCPCS SELF ADMINISTERED DRUGS (ALT 637 FOR MEDICARE OP)

## 2024-02-14 PROCEDURE — 2060000001 HC INTERMEDIATE ICU ROOM DAILY

## 2024-02-14 PROCEDURE — 99233 SBSQ HOSP IP/OBS HIGH 50: CPT | Performed by: INTERNAL MEDICINE

## 2024-02-14 RX ORDER — METOPROLOL SUCCINATE 50 MG/1
50 TABLET, EXTENDED RELEASE ORAL DAILY
Status: DISCONTINUED | OUTPATIENT
Start: 2024-02-15 | End: 2024-02-15 | Stop reason: HOSPADM

## 2024-02-14 RX ORDER — EAR PLUGS
1 EACH OTIC (EAR) 2 TIMES DAILY PRN
Status: DISCONTINUED | OUTPATIENT
Start: 2024-02-14 | End: 2024-02-15 | Stop reason: HOSPADM

## 2024-02-14 RX ADMIN — INSULIN LISPRO 3 UNITS: 100 INJECTION, SOLUTION INTRAVENOUS; SUBCUTANEOUS at 18:02

## 2024-02-14 RX ADMIN — INSULIN LISPRO 3 UNITS: 100 INJECTION, SOLUTION INTRAVENOUS; SUBCUTANEOUS at 11:45

## 2024-02-14 RX ADMIN — HEPARIN SODIUM 5000 UNITS: 5000 INJECTION INTRAVENOUS; SUBCUTANEOUS at 04:37

## 2024-02-14 RX ADMIN — Medication 1 APPLICATION: at 18:02

## 2024-02-14 RX ADMIN — IPRATROPIUM BROMIDE AND ALBUTEROL SULFATE 3 ML: 2.5; .5 SOLUTION RESPIRATORY (INHALATION) at 14:53

## 2024-02-14 RX ADMIN — ATORVASTATIN CALCIUM 40 MG: 40 TABLET, FILM COATED ORAL at 08:16

## 2024-02-14 RX ADMIN — CLOPIDOGREL 75 MG: 75 TABLET ORAL at 08:17

## 2024-02-14 RX ADMIN — FUROSEMIDE 80 MG: 10 INJECTION, SOLUTION INTRAMUSCULAR; INTRAVENOUS at 08:16

## 2024-02-14 RX ADMIN — HYDROMORPHONE HYDROCHLORIDE 0.4 MG: 1 INJECTION, SOLUTION INTRAMUSCULAR; INTRAVENOUS; SUBCUTANEOUS at 08:35

## 2024-02-14 RX ADMIN — FUROSEMIDE 80 MG: 10 INJECTION, SOLUTION INTRAMUSCULAR; INTRAVENOUS at 21:14

## 2024-02-14 RX ADMIN — HEPARIN SODIUM 5000 UNITS: 5000 INJECTION INTRAVENOUS; SUBCUTANEOUS at 11:44

## 2024-02-14 RX ADMIN — LEVOTHYROXINE SODIUM 50 MCG: 50 TABLET ORAL at 08:16

## 2024-02-14 RX ADMIN — AMLODIPINE BESYLATE 10 MG: 5 TABLET ORAL at 08:17

## 2024-02-14 RX ADMIN — HEPARIN SODIUM 5000 UNITS: 5000 INJECTION INTRAVENOUS; SUBCUTANEOUS at 21:14

## 2024-02-14 RX ADMIN — PANTOPRAZOLE SODIUM 40 MG: 40 TABLET, DELAYED RELEASE ORAL at 08:17

## 2024-02-14 RX ADMIN — IPRATROPIUM BROMIDE AND ALBUTEROL SULFATE 3 ML: 2.5; .5 SOLUTION RESPIRATORY (INHALATION) at 07:56

## 2024-02-14 RX ADMIN — PANTOPRAZOLE SODIUM 40 MG: 40 TABLET, DELAYED RELEASE ORAL at 21:14

## 2024-02-14 RX ADMIN — VENLAFAXINE HYDROCHLORIDE 75 MG: 75 CAPSULE, EXTENDED RELEASE ORAL at 08:18

## 2024-02-14 RX ADMIN — LEVOTHYROXINE SODIUM 200 MCG: 100 TABLET ORAL at 08:17

## 2024-02-14 RX ADMIN — ASPIRIN 81 MG: 81 TABLET, COATED ORAL at 08:16

## 2024-02-14 RX ADMIN — INSULIN GLARGINE 8 UNITS: 100 INJECTION, SOLUTION SUBCUTANEOUS at 21:14

## 2024-02-14 RX ADMIN — FERROUS GLUCONATE 324 MG: 324 TABLET ORAL at 08:18

## 2024-02-14 RX ADMIN — IPRATROPIUM BROMIDE AND ALBUTEROL SULFATE 3 ML: 2.5; .5 SOLUTION RESPIRATORY (INHALATION) at 19:48

## 2024-02-14 RX ADMIN — Medication 5 MG: at 21:14

## 2024-02-14 ASSESSMENT — COGNITIVE AND FUNCTIONAL STATUS - GENERAL
WALKING IN HOSPITAL ROOM: A LOT
STANDING UP FROM CHAIR USING ARMS: A LOT
STANDING UP FROM CHAIR USING ARMS: A LOT
MOBILITY SCORE: 12
DRESSING REGULAR UPPER BODY CLOTHING: A LITTLE
MOVING FROM LYING ON BACK TO SITTING ON SIDE OF FLAT BED WITH BEDRAILS: A LOT
TURNING FROM BACK TO SIDE WHILE IN FLAT BAD: A LOT
DAILY ACTIVITIY SCORE: 17
CLIMB 3 TO 5 STEPS WITH RAILING: A LOT
MOVING TO AND FROM BED TO CHAIR: A LOT
EATING MEALS: A LITTLE
TOILETING: A LITTLE
WALKING IN HOSPITAL ROOM: A LOT
DRESSING REGULAR LOWER BODY CLOTHING: A LOT
DRESSING REGULAR LOWER BODY CLOTHING: A LOT
MOVING FROM LYING ON BACK TO SITTING ON SIDE OF FLAT BED WITH BEDRAILS: A LOT
HELP NEEDED FOR BATHING: A LITTLE
DAILY ACTIVITIY SCORE: 17
TOILETING: A LITTLE
HELP NEEDED FOR BATHING: A LITTLE
TURNING FROM BACK TO SIDE WHILE IN FLAT BAD: A LOT
CLIMB 3 TO 5 STEPS WITH RAILING: A LOT
DRESSING REGULAR UPPER BODY CLOTHING: A LITTLE
EATING MEALS: A LITTLE
PERSONAL GROOMING: A LITTLE
MOVING TO AND FROM BED TO CHAIR: A LOT
PERSONAL GROOMING: A LITTLE
MOBILITY SCORE: 12

## 2024-02-14 ASSESSMENT — PAIN SCALES - GENERAL
PAINLEVEL_OUTOF10: 0 - NO PAIN
PAINLEVEL_OUTOF10: 4

## 2024-02-14 NOTE — PROGRESS NOTES
Daphne Morris is a 68 y.o. female on day 12 of admission presenting with CHF (congestive heart failure), NYHA class I, acute on chronic, combined (CMS/HCC).      Subjective   She is sitting up in the bed, had slight shortness of breath this morning, none currently.       Review of systems:  Constitutional: negative for fever, chills, or malaise  Neuro: negative for dizziness, headache, numbness, tingling  ENT: Negative for nasal congestion or sore throat  CV: negative for chest pain, palpitations  GI: negative for abd pain, nausea, vomiting or diarrhea  : negative for dysuria, frequency, or urgency  Musculoskeletal: Negative for weakness, myalgia, or arthralgia  Endocrine: Negative for polyuria or polydipsia         Objective   Constitutional: Well developed, awake/alert/oriented x3, no distress, alert and cooperative  Eyes: PERRL, EOMI, clear sclera  ENMT: mucous membranes moist, no apparent injury, no lesions seen  Head/Neck: Neck supple, no apparent injury, thyroid without mass or tenderness, No JVD, trachea midline, no bruits  Respiratory/Thorax: Patent airways, CTAB, diminished and crackles in bases, breath sounds with good chest expansion, thorax symmetric  Cardiovascular: Regular, rate and rhythm, no murmurs, 2+ equal pulses of the extremities, normal S 1and S 2  Gastrointestinal: Nondistended, soft, non-tender, no rebound tenderness or guarding, no masses palpable, no organomegaly, +BS, no bruits  Musculoskeletal: ROM intact, no joint swelling, normal strength  Extremities:  2+ pitting edema BUE and lower extremities  Neurological: alert and oriented x3, intact senses, motor, response and reflexes, normal strength  Lymphatic: No significant lymphadenopathy  Psychological: Appropriate mood and behavior  Skin: Warm and dry, no lesions, no rashes      Last Recorded Vitals  /64 (BP Location: Left arm, Patient Position: Lying)   Pulse 78   Temp 37.3 °C (99.1 °F) (Temporal)   Resp 18   Ht 1.676 m (5'  "6\")   Wt 99.8 kg (220 lb)   SpO2 97%   BMI 35.51 kg/m²     Intake/Output last 3 Shifts:  I/O last 3 completed shifts:  In: 960 (9.6 mL/kg) [P.O.:960]  Out: 3225 (32.3 mL/kg) [Urine:3225 (0.9 mL/kg/hr)]  Weight: 99.8 kg   I/O this shift:  In: 240 [P.O.:240]  Out: 600 [Urine:600]    Relevant Results  Scheduled medications  [Held by provider] amiodarone, 200 mg, oral, Daily with breakfast  amLODIPine, 10 mg, oral, Daily  aspirin, 81 mg, oral, Daily  atorvastatin, 40 mg, oral, Daily  bisacodyl, 10 mg, rectal, Daily  clopidogrel, 75 mg, oral, Daily  dextrose, 25 g, intravenous, Once  ferrous gluconate, 324 mg, oral, Daily with breakfast  furosemide, 80 mg, intravenous, q12h  heparin (porcine), 5,000 Units, subcutaneous, q8h  insulin glargine, 8 Units, subcutaneous, Nightly  insulin lispro, 0-15 Units, subcutaneous, TID with meals  ipratropium-albuteroL, 3 mL, nebulization, TID  lactulose, 20 g, oral, Daily  lactulose, 20 g, oral, Daily  levothyroxine, 200 mcg, oral, Daily before breakfast  levothyroxine, 50 mcg, oral, Daily before breakfast  [Held by provider] metoprolol succinate XL, 50 mg, oral, BID  pantoprazole, 40 mg, oral, BID  [Held by provider] potassium chloride CR, 20 mEq, oral, Daily  [Held by provider] SITagliptin phosphate, 25 mg, oral, Daily  venlafaxine XR, 75 mg, oral, Daily      Continuous medications     PRN medications  PRN medications: acetaminophen, dextrose 10 % in water (D10W), dextrose 10 % in water (D10W), dextrose, dextrose, glucagon, glucagon, HYDROmorphone, ipratropium-albuteroL, melatonin, oxygen, oxygen, oxygen    Results for orders placed or performed during the hospital encounter of 02/02/24 (from the past 24 hour(s))   POCT GLUCOSE   Result Value Ref Range    POCT Glucose 180 (H) 74 - 99 mg/dL   Renal function panel   Result Value Ref Range    Glucose 211 (H) 74 - 99 mg/dL    Sodium 142 136 - 145 mmol/L    Potassium 3.3 (L) 3.5 - 5.3 mmol/L    Chloride 98 98 - 107 mmol/L    Bicarbonate " 32 21 - 32 mmol/L    Anion Gap 15 10 - 20 mmol/L    Urea Nitrogen 67 (H) 6 - 23 mg/dL    Creatinine 2.79 (H) 0.50 - 1.05 mg/dL    eGFR 18 (L) >60 mL/min/1.73m*2    Calcium 7.6 (L) 8.6 - 10.3 mg/dL    Phosphorus 3.9 2.5 - 4.9 mg/dL    Albumin 2.4 (L) 3.4 - 5.0 g/dL   POCT GLUCOSE   Result Value Ref Range    POCT Glucose 169 (H) 74 - 99 mg/dL   CBC   Result Value Ref Range    WBC 6.7 4.4 - 11.3 x10*3/uL    nRBC 0.0 0.0 - 0.0 /100 WBCs    RBC 2.59 (L) 4.00 - 5.20 x10*6/uL    Hemoglobin 7.7 (L) 12.0 - 16.0 g/dL    Hematocrit 25.6 (L) 36.0 - 46.0 %    MCV 99 80 - 100 fL    MCH 29.7 26.0 - 34.0 pg    MCHC 30.1 (L) 32.0 - 36.0 g/dL    RDW 13.7 11.5 - 14.5 %    Platelets 225 150 - 450 x10*3/uL   POCT GLUCOSE   Result Value Ref Range    POCT Glucose 150 (H) 74 - 99 mg/dL   Sars-CoV-2 PCR   Result Value Ref Range    Coronavirus 2019, PCR Not Detected Not Detected   POCT GLUCOSE   Result Value Ref Range    POCT Glucose 178 (H) 74 - 99 mg/dL       XR chest 2 views   Final Result   Stable moderate to large right pleural effusion.        MACRO   None        Signed by: Amando Starks 2/14/2024 9:50 AM   Dictation workstation:   [id number]      XR chest 1 view   Final Result   Large right pleural effusion, significantly increased compared to 1   week ago.             Signed by: Sukumar Daugherty 2/9/2024 3:56 PM   Dictation workstation:   [id number]      Cardiac catheterization - non-coronary   Final Result      Transthoracic Echo (TTE) Complete   Final Result      XR chest 1 view   Final Result   Marked interval improvement in the right pleural effusion with   interval thoracentesis. No pneumothorax.        Pulmonary vascular congestion        Minimal atelectasis right lung base        MACRO:   None.        Signed by: Rayna Carson 2/2/2024 3:23 PM   Dictation workstation:   [id number]      XR knee left 4+ views   Final Result   1. Small joint effusion and degenerative change of the knee without   osseous injury evident.  Knowledge of any trauma may be helpful. If   there is persistent concern for osseous injury, cross-sectional   imaging can be considered.   2. Reticular increased density in the subcutaneous tissues which may   represent lymphedema and/or cellulitis.        MACRO:   None        Signed by: Adama Jonas 2/2/2024 4:24 PM   Dictation workstation:   [id number]      US renal complete   Final Result   No left hydronephrosis. Right total nephrectomy.        MACRO:   None        Signed by: Amando Starks 2/2/2024 2:36 PM   Dictation workstation:   [id number]          Transthoracic Echo (TTE) Complete    Result Date: 2/5/2024   [facility], [address] [place] [address]               Tel [phone] and Fax [phone] TRANSTHORACIC ECHOCARDIOGRAM REPORT  Patient Name:      MOE OTERO ZULMA      Reading Physician:    [id number] Nicholas Antonio MD Study Date:        2/5/2024             Ordering Provider:    [id number] DOMONIQUE PURCELL MRN/PID:           [id number]             Fellow: Accession#:        [id number]         Nurse: Date of Birth/Age: 1955 / 68 years Sonographer:          Nina Angel RDCS Gender:            F                    Additional Staff: Height:            167.64 cm            Admit Date:           2/2/2024 Weight:            102.51 kg            Admission Status:     Inpatient -                                                               Routine BSA:               2.11 m2              Encounter#:           [id number]                                         Department Location:  [facility] Non                                                               Invasive Blood Pressure: 147 /65 mmHg Study Type:    TRANSTHORACIC ECHO (TTE) COMPLETE Diagnosis/ICD: Acute combined systolic  (congestive) and diastolic (congestive)                heart failure (CHF)-I50.41 Indication:    Congestive Heart Failure CPT Code:      Echo Complete w Full Doppler-[id number] Patient History: Diabetes:         Yes Pertinent         A-Fib, HTN, Dyspnea and LE Edema. Watchman device, elevated History:          BNP,. Study Detail: The following Echo studies were performed: 2D, M-Mode, Doppler and               color flow.  PHYSICIAN INTERPRETATION: Left Ventricle: The left ventricular systolic function is normal, with an estimated ejection fraction of 60-65%. There are no regional wall motion abnormalities. The left ventricular cavity size is normal. Spectral Doppler shows an impaired relaxation pattern of left ventricular diastolic filling. Left Atrium: The left atrium is moderately dilated. Right Ventricle: The right ventricle is mildly enlarged. There is mildly reduced right ventricular systolic function. Right Atrium: The right atrium is moderately dilated. Aortic Valve: The aortic valve is trileaflet. There is mild aortic valve cusp calcification. There is trivial aortic valve regurgitation. The peak instantaneous gradient of the aortic valve is 14.1 mmHg. The mean gradient of the aortic valve is 9.1 mmHg. Mitral Valve: The mitral valve is normal in structure. There is mild mitral annular calcification. There is mild to moderate mitral valve regurgitation. Tricuspid Valve: The tricuspid valve is structurally normal. There is moderate tricuspid regurgitation. Pulmonic Valve: The pulmonic valve is structurally normal. There is mild pulmonic valve regurgitation. Pericardium: There is a small to moderate pericardial effusion posterior to the left ventricle. There is no evidence of cardiac tamponade. Aorta: The aortic root is normal. Pulmonary Artery: The tricuspid regurgitant velocity is 3.04 m/s, and with an estimated right atrial pressure of 3 mmHg, the estimated pulmonary artery pressure is mildly elevated with the  RVSP at 39.9 mmHg. Pulmonary Veins: The pulmonary veins appear normal and return normally to the left atrium. Systemic Veins: The inferior vena cava appears to be of normal size. There is IVC inspiratory collapse greater than 50%.  CONCLUSIONS:  1. Left ventricular systolic function is normal with a 60-65% estimated ejection fraction.  2. Spectral Doppler shows an impaired relaxation pattern of left ventricular diastolic filling.  3. There is mildly reduced right ventricular systolic function.  4. The left atrium is moderately dilated.  5. The right atrium is moderately dilated.  6. There is a small to moderate pericardial effusion.  7. There is no evidence of cardiac tamponade.  8. Mild to moderate mitral valve regurgitation.  9. Moderate tricuspid regurgitation visualized. 10. Normal CVP. Estimated PASP around 45 mm Hg. QUANTITATIVE DATA SUMMARY: 2D MEASUREMENTS:                           Normal Ranges: IVSd:          1.52 cm    (0.6-1.1cm) LVPWd:         1.42 cm    (0.6-1.1cm) LVIDd:         4.35 cm    (3.9-5.9cm) LVIDs:         2.94 cm LV Mass Index: 120.7 g/m2 LV % FS        32.3 % LA VOLUME:                              Normal Ranges: LA Vol A4C:       79.5 ml    (22+/-6mL/m2) LA Vol A2C:       78.2 ml LA Vol BP:        79.7 ml LA Vol Index A4C: 37.7ml/m2 LA Vol Index A2C: 37.1 ml/m2 LA Vol Index BP:  37.9 ml/m2 LA Volume Index:  37.8 ml/m2 LA Vol A4C:       76.0 ml LA Vol A2C:       73.9 ml RA VOLUME BY A/L METHOD:                       Normal Ranges: RA Area A4C: 17.8 cm2 M-MODE MEASUREMENTS:                  Normal Ranges: Ao Root: 3.00 cm (2.0-3.7cm) LAs:     5.08 cm (2.7-4.0cm) LV SYSTOLIC FUNCTION BY 2D PLANIMETRY (MOD):                     Normal Ranges: EF-A4C View: 63.4 % (>=55%) EF-A2C View: 63.8 % EF-Biplane:  62.5 % LV DIASTOLIC FUNCTION:                             Normal Ranges: MV Peak E:      1.33 m/s    (0.7-1.2 m/s) MV Peak A:      0.53 m/s    (0.42-0.7 m/s) E/A Ratio:      2.52         (1.0-2.2) MV e'           0.06 m/s    (>8.0) MV lateral e'   0.06 m/s MV medial e'    0.06 m/s MV A Dur:       110.73 msec E/e' Ratio:     22.09       (<8.0) PulmV Sys Mike:  53.97 cm/s PulmV Dugan Mike: 77.63 cm/s PulmV S/D Mike:  0.70 MITRAL VALVE:                 Normal Ranges: MV DT: 245 msec (150-240msec) MITRAL INSUFFICIENCY:                         Normal Ranges: MR VTI:     171.38 cm MR Vmax:    489.73 cm/s MR Volume:  20.07 ml MR Flow Rt: 57.36 ml/s MR EROA:    0.12 cm2 AORTIC VALVE:                                    Normal Ranges: AoV Vmax:                1.88 m/s  (<=1.7m/s) AoV Peak P.1 mmHg (<20mmHg) AoV Mean P.1 mmHg  (1.7-11.5mmHg) LVOT Max Mike:            0.97 m/s  (<=1.1m/s) AoV VTI:                 46.22 cm  (18-25cm) LVOT VTI:                24.89 cm LVOT Diameter:           1.96 cm   (1.8-2.4cm) AoV Area, VTI:           1.62 cm2  (2.5-5.5cm2) AoV Area,Vmax:           1.56 cm2  (2.5-4.5cm2) AoV Dimensionless Index: 0.54  RIGHT VENTRICLE: RV Basal 3.80 cm RV Mid   2.80 cm RV Major 8.0 cm TAPSE:   18.0 mm RV s'    0.13 m/s TRICUSPID VALVE/RVSP:                             Normal Ranges: Peak TR Velocity: 3.04 m/s RV Syst Pressure: 39.9 mmHg (< 30mmHg) PULMONIC VALVE:                      Normal Ranges: PV Max Mike: 1.0 m/s  (0.6-0.9m/s) PV Max PG:  3.9 mmHg Pulmonary Veins: PulmV Dugan Mike: 77.63 cm/s PulmV S/D Mike:  0.70 PulmV Sys Mike:  53.97 cm/s  [id number] Nicholas Antonio MD Electronically signed on 2024 at 5:06:05 PM  ** Final **           Assessment/Plan   Principal Problem:    CHF (congestive heart failure), NYHA class I, acute on chronic, combined (CMS/[facility])    Echocardiogram from 2023 (post watchman) reviewed: LVEF 55%, small to moderate pericardial effusions         1. Acute on chronic hypoxic/hypercapnic respiratory failure 2/2 acute on chronic diastolic CHF and pneumonia, COPD exac, Pleural effusion  -CXR showed large right pleural effusion  -CTA of  the chest showed large right pleural effusion with complete collapse of the right lower lung, pneumonia, a small pericardial effusion, and soft tissue edema at the chest and abd wall.   -bronchodilators  -Steroids  -oxygen support  -Still with ignificant leg to abd swelling and shortness of breath  -Strict I & Os  -Daily weights  -2gm na diet  -Repeat echo as above  -Unable to start SGLT2 inhibitors with current GFR  -Pulmonary following, s/p thoracentesis 2.3L  -s/p RHC showed MEAN PA 30 MILD PULMONARY HTN MEAN RA 9 MM HG MEAN PCWP 23 MM HG CARDIAC OUTPUT 8.03 CI 3.79 NO SHUNTS   -Transition IV lasix to PO 80mg BID  -Monitor on tele     2. Elevated troponins-non MI elevation  -Denies chest pain  -Does have shortness of breath-worsening for weeks  -Had a LHC planned ->unable to lay flat  -Cont asa, plavix, statin, metoprolol  -Unable to do LHC due to Creatinine 3.6  -Monitor on tele     3. Paroxysmal atrial fibrillation with RVR  -s/p Watchman in Aug 2023  -Currently SB  -Cont amiodarone and metoprolol for rate control  -Monitor on tele     4. Hypertension  -stable  -2gm na diet  -cont meds  -monitor     5. Anemia  -Appear stable     6. Acute on Chronic kidney disease, improving  -s/p nephrectomy  -Renal consulted, note reviewed   -Renal US negative    7. Diabetes  -ISS  -Accuchecks     8. Hypothyroidism  -Cont synthroid     9. Pericardial effusion  -Mild to moderate on echo  -Cont diuresis      Kacey Monique, CARLENE-CNP

## 2024-02-14 NOTE — PROGRESS NOTES
Daphne Morris is a 68 y.o. female on day 12 of admission presenting with CHF (congestive heart failure), NYHA class I, acute on chronic, combined (CMS/HCC).      Subjective   Patient was seen and examined.  Fully awake and alert.  She is sitting in bed comfortably eating breakfast.  Serum creatinine improved and level of around 2.7.  Good urine output.  Not uremic or significantly hypervolemic on exam.  She is eager to leave the hospital    Objective          Vitals 24HR  Heart Rate:  [77-82]   Temp:  [36.7 °C (98.1 °F)-37.3 °C (99.1 °F)]   Resp:  [18-20]   BP: (121-150)/(64-89)   SpO2:  [96 %-99 %]     Intake/Output last 3 Shifts:    Intake/Output Summary (Last 24 hours) at 2/14/2024 1107  Last data filed at 2/14/2024 1038  Gross per 24 hour   Intake 720 ml   Output 1850 ml   Net -1130 ml         Physical Exam        General appearance: no distress awake and alert on room air, no significant hypervolemia in exam  Eyes: non-icteric  HEENT: atrumatic head, PEERLA, moist mucosa  Skin: no apparent rash  Heart: NSR, S1, S2 normal, no murmur or gallop  Lungs: Symmetrical expansion,CTA bilat no wheezing/crackles  Abdomen: soft, nt/nd, obese  Extremities: no significant edema bilat  Neuro: No FND,asterixis, no focal deficits noticed        RFP  Recent Labs     02/13/24  1808 02/13/24  0538 02/12/24  0537 02/11/24  0559 02/10/24  0542 02/08/24  0931 02/07/24  0607 02/06/24  0543 02/05/24  0555 02/04/24  0505 02/03/24  0508 02/02/24  0438 07/26/23  1727 07/23/23  0530    140 140 143 142 140 140 139 139 139 135* 142   < > 136   K 3.3* 3.2* 3.7 3.5 3.7 4.1 4.4 4.8 4.5 5.0 5.0 4.9   < > 3.6   CL 98 100 101 105 104 102 103 101 103 104 101 106   < > 96*   CO2 32 32 29 30 30 31 28 26 28 26 30 31   < > 33*   BUN 67* 69* 72* 75* 79* 74* 73* 68* 61* 56* 50* 47*   < > 46*   CREATININE 2.79* 2.87* 3.03* 3.27* 3.41* 3.58* 3.64* 3.67* 3.67* 3.49* 3.28* 3.21*   < > 2.90*   GLUCOSE 211* 128* 244* 46* 121* 120* 155* 217* 181* 189*  190* 183*   < > 98   CALCIUM 7.6* 7.4* 7.3* 7.3* 7.3* 7.4* 7.4* 7.5* 7.6* 7.3* 7.2* 7.5*   < > 8.1*   ALBUMIN 2.4*  --  2.3*  --   --   --   --  2.5* 2.4* 2.2* 2.0* 2.4*   < > 2.6*   PHOS 3.9  --   --   --   --   --   --  6.3* 6.1* 6.0* 5.8* 6.0*  --  4.2   EGFR 18* 17* 16* 15* 14* 13* 13* 13* 13* 14* 15* 15*   < >  --    ANIONGAP 15 11 14 12 12 11 13 17 13 14 9* 10   < > 11    < > = values in this interval not displayed.          Urineanalysis  Recent Labs     02/09/24  1248 02/01/24  0515 07/19/23  1437 04/26/23  1740 05/03/21  2035 04/23/21  0310 04/06/21  1200 04/10/18  1415   COLORU Yellow Yellow STRAW YELLOW YELLOW YELLOW PALE YELLOW YELLOW   APPEARANCEU Clear Clear CLEAR CLEAR CLEAR HAZY HAZY CLOUDY   SPECGRAVU 1.011 1.013 1.008 1.009 1.012 1.020 1.005 1.017   SYLVIE 5.0 5.0 6.0 6.0 6.5 6.0 6.5 5.5   PROTUR >=500 (3+)* >=500 (3+)* >=500 (3+)* 100(2+)* 300* 600* 100* 30*   GLUCOSEU 150 (2+)* >=500 (3+)* 150 (2+)* 50(1+)* 100* 150* 50* 300*   BLOODU MODERATE (2+)* NEGATIVE LARGE (3+)* SMALL(1+)* TRACE* TRACE* MODERATE* TRACE*   KETONESU NEGATIVE NEGATIVE NEGATIVE NEGATIVE NEGATIVE NEGATIVE NEGATIVE NEGATIVE   BILIRUBINU NEGATIVE NEGATIVE NEGATIVE NEGATIVE NEGATIVE NEGATIVE NEGATIVE NEGATIVE   NITRITEU NEGATIVE NEGATIVE NEGATIVE NEGATIVE NEGATIVE NEGATIVE POSITIVE* NEGATIVE   LEUKOCYTESU TRACE* NEGATIVE NEGATIVE NEGATIVE NEGATIVE SMALL* LARGE* LARGE*         Urine Electrolytes  Recent Labs     02/09/24  1248 02/01/24  0515 07/19/23  1437 04/30/23  1316 04/26/23  1740 05/03/21  2035 04/23/21  0310 04/06/21  1200 04/10/18  1415   SODIUMURR  --  40  --  27  --   --   --   --   --    NACREATUR  --  75  --  27  --   --   --   --   --    KUR  --  64  --   --   --   --   --   --   --    KCREATUR  --  119  --   --   --   --   --   --   --    CREATU  --  53.6  53.6  --  101.0  --   --   --   --   --    PROTUR >=500 (3+)* >=500 (3+)* >=500 (3+)*  --  100(2+)* 300* 600* 100* 30*   ALBUMINUR  --  >2,250.0  --   --   --   --    --   --   --           Urine Micro  Recent Labs     02/09/24  1248 02/01/24  0515 07/19/23  1437 04/26/23  1740 05/03/21  2035 04/23/21  0310 04/06/21  1200 04/10/18  1415   WBCU 6-10* 1-5 5* 1 NONE SEEN 53* LOADED 1,077*   RBCU 11-20* 3-5 >182* 1 2-4 5* 16-25 5*   HYALCASTU 2+* OCCASIONAL*  --   --   --  70  --  3   SQUAMEPIU 1-9 (SPARSE)  --  1 1  --  MODERATE  --  FEW   BACTERIAU 1+* 1+* 1+*  --   --  POSITIVE PRESENT  Performed at [facility] [address] [facility] [address]   POSITIVE   MUCUSU FEW FEW FEW  --   --   --   --   --           Iron  Recent Labs     05/02/23  0607 04/28/23  0615 04/27/23  0624 02/23/23  1113   IRON  --   --  26* 54   TIBC  --   --  289 281   IRONSAT  --   --  9* 19.2   FERRITIN 71 80  --  42         [Held by provider] amiodarone, 200 mg, oral, Daily with breakfast  amLODIPine, 10 mg, oral, Daily  aspirin, 81 mg, oral, Daily  atorvastatin, 40 mg, oral, Daily  bisacodyl, 10 mg, rectal, Daily  clopidogrel, 75 mg, oral, Daily  dextrose, 25 g, intravenous, Once  ferrous gluconate, 324 mg, oral, Daily with breakfast  furosemide, 80 mg, intravenous, q12h  heparin (porcine), 5,000 Units, subcutaneous, q8h  insulin glargine, 8 Units, subcutaneous, Nightly  insulin lispro, 0-15 Units, subcutaneous, TID with meals  ipratropium-albuteroL, 3 mL, nebulization, TID  lactulose, 20 g, oral, Daily  lactulose, 20 g, oral, Daily  levothyroxine, 200 mcg, oral, Daily before breakfast  levothyroxine, 50 mcg, oral, Daily before breakfast  [Held by provider] metoprolol succinate XL, 50 mg, oral, BID  pantoprazole, 40 mg, oral, BID  [Held by provider] potassium chloride CR, 20 mEq, oral, Daily  [Held by provider] SITagliptin phosphate, 25 mg, oral, Daily  venlafaxine XR, 75 mg, oral, Daily             Assessment/Plan                Principal Problem:    CHF (congestive heart failure), NYHA class I, acute on chronic, combined (CMS/[facility])           Daphne Morris  is a 68 y.o. female who has past  medical history of  heart failure, atrial fibrillation s/p Watchman, right nephrectomy, hypertension, type 2 DM admitted for acute hypoxic respiratory failure 2/2 to pneumonia and acute decompensated heart failure.        # ANDREA  -Likely  Cardiorenal syndrome 2/2 Acute decompensated CHF  -(CKD) 4/A3-baseline SCr 1.8-2.5 most likely atherosclerotic cardiovascular disease/diabetic nephropathy, prior nephrectomy.  She follows with Dr. Mitchell  - Peaked Scr during this admission 3-3.2, GFR 15 and has been plateauing around 2.7  - Urine analysis showed 3+ protien in urine, negative blood, 3-5 RBCs, 1-5 WBCs  -Kidney ultrasound with no obstruction on the left side.  Right total nephrectomy noticed  -Currently on Lasix 80 mg IV twice daily with good urine output and within normal electrolytes  -Urine lites with no evidence for prerenal injury     Plan  -No indication to immediately initiate dialysis however she might need in the future.  Currently kidney function is poor stable and within normal electrolytes  -Consider transitioning to p.o. diuretics.  I suggest Lasix 80 mg p.o. twice daily to start with (this is equal to half what she is getting currently 80 mg IV twice daily).  Will defer diuretic management to cardiology team  - strict I/Os, daily weight  - avoid contrast if possible  - would recommend starting an SGLT2 inhibitor outpatient if GFR returns to >25  -No need for Fay catheter from kidney standpoint-this can be removed prior to discharge with voiding trial     Okay to discharge from kidney standpoint.  I informed her primary nephrologist Dr. Mitchell about this admission and needs to follow-up with him.  Patient is aware too    John Pena MD

## 2024-02-14 NOTE — PROGRESS NOTES
"Occupational Therapy                 Therapy Communication Note    Patient Name: Daphne Morris  MRN: [id number]  Today's Date: 2/14/2024     Discipline: Occupational Therapy    Missed Visit Reason: Missed Visit Reason: Patient refused (Pt eating lunch. Offered to return after finishing, pt states \"I'm just too tired, they've been moving me all over the place\". Pt educated on benefits of participation, pt still declined. No OT treat this date)    Missed Time: Attempt    "

## 2024-02-14 NOTE — CARE PLAN
The patient's goals for the shift include  be discharged to SNF    The clinical goals for the shift include Patient will remain HDS throughout the shift.    Over the shift, the patient did not make progress toward the following goals. Barriers to progression include acute illness, weakness. Recommendations to address these barriers include PT/OT, medications.

## 2024-02-14 NOTE — PROGRESS NOTES
Daphne Morris is a 68 y.o. female on day 12 of admission presenting with CHF (congestive heart failure), NYHA class I, acute on chronic, combined (CMS/HCC).      Subjective   No acute events overnight. Pt denies any new or acute concerns this morning.  She is hopeful she is going to SNF today       Objective     Last Recorded Vitals  /72 (BP Location: Left arm, Patient Position: Lying)   Pulse 77   Temp 37 °C (98.6 °F) (Tympanic)   Resp 20   Wt 99.8 kg (220 lb)   SpO2 98%   Intake/Output last 3 Shifts:    Intake/Output Summary (Last 24 hours) at 2/14/2024 0928  Last data filed at 2/14/2024 0446  Gross per 24 hour   Intake 720 ml   Output 2000 ml   Net -1280 ml         Admission Weight  Weight: 99.8 kg (220 lb 0.3 oz) (02/02/24 0404)    Daily Weight  02/12/24 : 99.8 kg (220 lb)    Image Results  XR chest 1 view  Narrative: Interpreted By:  Sukumar Daugherty,   STUDY:  XR CHEST 1 VIEW;  2/9/2024 1:13 pm      INDICATION:  Signs/Symptoms:fever.      COMPARISON:  02/02/2024      ACCESSION NUMBER(S):  [id number]      ORDERING CLINICIAN:  TONIA MONTANA      FINDINGS:  Significantly increased large right pleural effusion with overlying  atelectasis. Left lung grossly clear. Cardiomediastinal silhouette  unchanged. Aortic atherosclerosis. Pulmonary vascular congestion.      Impression: Large right pleural effusion, significantly increased compared to 1  week ago.          Signed by: Sukumar Daugherty 2/9/2024 3:56 PM  Dictation workstation:   [id number]    No current facility-administered medications on file prior to encounter.     Current Outpatient Medications on File Prior to Encounter   Medication Sig Dispense Refill    albuterol 90 mcg/actuation inhaler Inhale 2 puffs every 4 hours if needed.      amiodarone (Pacerone) 200 mg tablet Take 1 tablet (200 mg) by mouth once daily with a meal.      amLODIPine (Norvasc) 5 mg tablet Take 1 tablet (5 mg) by mouth once daily.      aspirin 81 mg EC tablet Take 1 tablet (81  "mg) by mouth once daily.      atorvastatin (Lipitor) 40 mg tablet Take 1 tablet (40 mg) by mouth once daily.      BD Calista 2nd Gen Pen Needle 32 gauge x 5/32\" needle USE SUBCUTANEOUSLY AS DIRECTED TWICE DAILY      clopidogrel (Plavix) 75 mg tablet Take 1 tablet (75 mg) by mouth once daily.      ferrous gluconate 324 (38 Fe) MG tablet Take 1 tablet (324 mg) by mouth once daily with breakfast. 30 tablet 3    FreeStyle Shakir 14 Day Sensor kit USE AS DIRECTED TO CHECK SUGARS 3 TO 4 TIMES DAILY      FreeStyle Lite Strips strip twice a day.      HumaLOG KwikPen Insulin 100 unit/mL injection up to 15 units Subcutaneous three times a day per sliding scale      ipratropium-albuteroL (Duo-Neb) 0.5-2.5 mg/3 mL nebulizer solution Take 3 mL by nebulization every 6 hours if needed.      Lantus Solostar U-100 Insulin 100 unit/mL (3 mL) pen Inject 14 Units under the skin once daily at bedtime.      levothyroxine (Synthroid, Levoxyl) 200 mcg tablet Take 1 tablet (200 mcg) by mouth once daily in the morning. Take before meals. 90 tablet 3    levothyroxine (Synthroid, Levoxyl) 50 mcg tablet Take 1 tablet (50 mcg) by mouth once daily in the morning. Take before meals.      metoprolol succinate XL (Toprol-XL) 50 mg 24 hr tablet TAKE ONE TABLET BY MOUTH TWO TIMES A  tablet 1    oxygen (O2) gas therapy 2 l/min nasal cannula      pantoprazole (ProtoNix) 40 mg EC tablet Take 1 tablet (40 mg) by mouth 2 times a day. 60 tablet 6    potassium chloride CR 20 mEq ER tablet TAKE ONE TABLET BY MOUTH EVERY DAY WITH FOOD 90 tablet 1    SITagliptin phosphate (Januvia) 100 mg tablet Take 1 tablet (100 mg) by mouth once daily.      venlafaxine XR (Effexor-XR) 75 mg 24 hr capsule Take 1 capsule (75 mg) by mouth once daily.      [DISCONTINUED] Soaanz 60 mg tablet Take 1 tablet by mouth once daily. 30 tablet 3      Results for orders placed or performed during the hospital encounter of 02/02/24 (from the past 24 hour(s))   POCT GLUCOSE   Result " Value Ref Range    POCT Glucose 144 (H) 74 - 99 mg/dL   POCT GLUCOSE   Result Value Ref Range    POCT Glucose 151 (H) 74 - 99 mg/dL   POCT GLUCOSE   Result Value Ref Range    POCT Glucose 180 (H) 74 - 99 mg/dL   Renal function panel   Result Value Ref Range    Glucose 211 (H) 74 - 99 mg/dL    Sodium 142 136 - 145 mmol/L    Potassium 3.3 (L) 3.5 - 5.3 mmol/L    Chloride 98 98 - 107 mmol/L    Bicarbonate 32 21 - 32 mmol/L    Anion Gap 15 10 - 20 mmol/L    Urea Nitrogen 67 (H) 6 - 23 mg/dL    Creatinine 2.79 (H) 0.50 - 1.05 mg/dL    eGFR 18 (L) >60 mL/min/1.73m*2    Calcium 7.6 (L) 8.6 - 10.3 mg/dL    Phosphorus 3.9 2.5 - 4.9 mg/dL    Albumin 2.4 (L) 3.4 - 5.0 g/dL   POCT GLUCOSE   Result Value Ref Range    POCT Glucose 169 (H) 74 - 99 mg/dL   CBC   Result Value Ref Range    WBC 6.7 4.4 - 11.3 x10*3/uL    nRBC 0.0 0.0 - 0.0 /100 WBCs    RBC 2.59 (L) 4.00 - 5.20 x10*6/uL    Hemoglobin 7.7 (L) 12.0 - 16.0 g/dL    Hematocrit 25.6 (L) 36.0 - 46.0 %    MCV 99 80 - 100 fL    MCH 29.7 26.0 - 34.0 pg    MCHC 30.1 (L) 32.0 - 36.0 g/dL    RDW 13.7 11.5 - 14.5 %    Platelets 225 150 - 450 x10*3/uL   POCT GLUCOSE   Result Value Ref Range    POCT Glucose 150 (H) 74 - 99 mg/dL        Physical Exam  Constitutional:       Appearance: She is not ill-appearing.      Comments: Chronically ill appearing. nad   HENT:      Head: Normocephalic.   Eyes:      Extraocular Movements: Extraocular movements intact.   Cardiovascular:      Rate and Rhythm: Normal rate and regular rhythm.      Heart sounds: No murmur heard.     Comments: Regular, with ectopy. 1/6 systolic murmur  Pulmonary:      Comments: Good inspiratory effort. Bilateral basilar crackles, but decreased breath sounds right base  Abdominal:      General: Bowel sounds are normal.      Palpations: Abdomen is soft.   Genitourinary:     Comments: vega  Musculoskeletal:      Comments: Dressings on LLE wounds.   Edema improved, trace-1+ above dressings now. Faint distal pulses   Skin:      General: Skin is dry.   Neurological:      Mental Status: She is alert and oriented to person, place, and time.      Motor: Weakness present.   Psychiatric:         Mood and Affect: Mood normal.         Behavior: Behavior normal.         Relevant Results       Assessment/Plan        This patient has a urinary catheter   Reason for the urinary catheter remaining today? urinary retention/bladder outlet obstruction, acute or chronic      Principal Problem:    CHF (congestive heart failure), NYHA class I, acute on chronic, combined (CMS/[facility])    Ms Morris has had an extended stay for decompensated heart failure with hypoxia, with andrea on ckd, pleural effusions, hypoglycemia now off and on over weekend. She is improving with diuresis. Anticipate DC in 1-2 days    Acute decompensated heart failure, combined. Placed on high dose lasix several days ago with fair response. 1600 over last 24 hours. Also with pericardial effusion on echo. CXR on 9th showed marked enlargement of effusion, will recheck cxr tomorrow  ANDREA, on ckd. Creatinine down-trending today. Still on high dose lasix. Nephro consulted, recommends transition to PO lasix as soon as cardiology feels it is appropriate.  PAF. Periods of bradycardia noted, cards aware.  DM. Monitor BG on accuchecks; cont SSI  Hypothyroid, stable on supplement  Pleural effusion, as above. Did have thora, over 2 liters removed at that time  COPD, no exacerbation at this time  Hypoalbuminemia    Dispo: agreeable to SNF; will communicate with [facility] and get it set up            J Carlos Garcia, DO

## 2024-02-14 NOTE — DISCHARGE INSTRUCTIONS
"BLE wound care q 48 hours:  Cleanse with NS, Apply AQUACEL to left calf, Apply XEROFORM to knee and RLE wounds.  Cover with 4 x 4 each wound.    Knee- hold in place with tubifast sleeve.  BLE- ABD, wrap with Kerlix, then wrap with ACE toes to 1\" below knee lightly.  Use waffle boots while in bed. Until healed.  Moisturize daily.     "

## 2024-02-15 VITALS
HEIGHT: 66 IN | SYSTOLIC BLOOD PRESSURE: 153 MMHG | DIASTOLIC BLOOD PRESSURE: 64 MMHG | WEIGHT: 211.2 LBS | RESPIRATION RATE: 21 BRPM | HEART RATE: 72 BPM | OXYGEN SATURATION: 93 % | TEMPERATURE: 99.5 F | BODY MASS INDEX: 33.94 KG/M2

## 2024-02-15 LAB
ANION GAP SERPL CALC-SCNC: 9 MMOL/L (ref 10–20)
BUN SERPL-MCNC: 58 MG/DL (ref 6–23)
CALCIUM SERPL-MCNC: 8 MG/DL (ref 8.6–10.3)
CHLORIDE SERPL-SCNC: 101 MMOL/L (ref 98–107)
CO2 SERPL-SCNC: 35 MMOL/L (ref 21–32)
CREAT SERPL-MCNC: 2.66 MG/DL (ref 0.5–1.05)
EGFRCR SERPLBLD CKD-EPI 2021: 19 ML/MIN/1.73M*2
ERYTHROCYTE [DISTWIDTH] IN BLOOD BY AUTOMATED COUNT: 13.8 % (ref 11.5–14.5)
GLUCOSE BLD MANUAL STRIP-MCNC: 156 MG/DL (ref 74–99)
GLUCOSE BLD MANUAL STRIP-MCNC: 203 MG/DL (ref 74–99)
GLUCOSE BLD MANUAL STRIP-MCNC: 97 MG/DL (ref 74–99)
GLUCOSE SERPL-MCNC: 72 MG/DL (ref 74–99)
HCT VFR BLD AUTO: 25.4 % (ref 36–46)
HGB BLD-MCNC: 7.8 G/DL (ref 12–16)
MCH RBC QN AUTO: 30.1 PG (ref 26–34)
MCHC RBC AUTO-ENTMCNC: 30.7 G/DL (ref 32–36)
MCV RBC AUTO: 98 FL (ref 80–100)
NRBC BLD-RTO: 0 /100 WBCS (ref 0–0)
PLATELET # BLD AUTO: 222 X10*3/UL (ref 150–450)
POTASSIUM SERPL-SCNC: 2.9 MMOL/L (ref 3.5–5.3)
RBC # BLD AUTO: 2.59 X10*6/UL (ref 4–5.2)
SODIUM SERPL-SCNC: 142 MMOL/L (ref 136–145)
WBC # BLD AUTO: 7.4 X10*3/UL (ref 4.4–11.3)

## 2024-02-15 PROCEDURE — 2500000002 HC RX 250 W HCPCS SELF ADMINISTERED DRUGS (ALT 637 FOR MEDICARE OP, ALT 636 FOR OP/ED): Performed by: INTERNAL MEDICINE

## 2024-02-15 PROCEDURE — 2500000001 HC RX 250 WO HCPCS SELF ADMINISTERED DRUGS (ALT 637 FOR MEDICARE OP): Performed by: INTERNAL MEDICINE

## 2024-02-15 PROCEDURE — 94640 AIRWAY INHALATION TREATMENT: CPT | Mod: MUE

## 2024-02-15 PROCEDURE — 2500000005 HC RX 250 GENERAL PHARMACY W/O HCPCS: Performed by: FAMILY MEDICINE

## 2024-02-15 PROCEDURE — 2500000004 HC RX 250 GENERAL PHARMACY W/ HCPCS (ALT 636 FOR OP/ED)

## 2024-02-15 PROCEDURE — 85027 COMPLETE CBC AUTOMATED: CPT | Performed by: INTERNAL MEDICINE

## 2024-02-15 PROCEDURE — 99239 HOSP IP/OBS DSCHRG MGMT >30: CPT | Performed by: INTERNAL MEDICINE

## 2024-02-15 PROCEDURE — 82374 ASSAY BLOOD CARBON DIOXIDE: CPT | Performed by: INTERNAL MEDICINE

## 2024-02-15 PROCEDURE — 82947 ASSAY GLUCOSE BLOOD QUANT: CPT

## 2024-02-15 PROCEDURE — 2500000001 HC RX 250 WO HCPCS SELF ADMINISTERED DRUGS (ALT 637 FOR MEDICARE OP)

## 2024-02-15 PROCEDURE — 36415 COLL VENOUS BLD VENIPUNCTURE: CPT | Performed by: INTERNAL MEDICINE

## 2024-02-15 PROCEDURE — 2500000002 HC RX 250 W HCPCS SELF ADMINISTERED DRUGS (ALT 637 FOR MEDICARE OP, ALT 636 FOR OP/ED)

## 2024-02-15 PROCEDURE — 2500000002 HC RX 250 W HCPCS SELF ADMINISTERED DRUGS (ALT 637 FOR MEDICARE OP, ALT 636 FOR OP/ED): Mod: MUE | Performed by: NURSE PRACTITIONER

## 2024-02-15 PROCEDURE — 99233 SBSQ HOSP IP/OBS HIGH 50: CPT | Performed by: INTERNAL MEDICINE

## 2024-02-15 PROCEDURE — 2500000001 HC RX 250 WO HCPCS SELF ADMINISTERED DRUGS (ALT 637 FOR MEDICARE OP): Performed by: NURSE PRACTITIONER

## 2024-02-15 RX ORDER — FUROSEMIDE 80 MG/1
80 TABLET ORAL
Qty: 60 TABLET | Refills: 3 | OUTPATIENT
Start: 2024-02-15

## 2024-02-15 RX ORDER — POTASSIUM CHLORIDE 20 MEQ/1
40 TABLET, EXTENDED RELEASE ORAL DAILY
Qty: 60 TABLET | Refills: 1 | Status: SHIPPED | OUTPATIENT
Start: 2024-02-15 | End: 2024-03-05 | Stop reason: HOSPADM

## 2024-02-15 RX ORDER — POTASSIUM CHLORIDE 20 MEQ/1
40 TABLET, EXTENDED RELEASE ORAL EVERY 4 HOURS
Status: COMPLETED | OUTPATIENT
Start: 2024-02-15 | End: 2024-02-15

## 2024-02-15 RX ORDER — FUROSEMIDE 80 MG/1
80 TABLET ORAL 2 TIMES DAILY
Qty: 60 TABLET | Refills: 3 | Status: SHIPPED | OUTPATIENT
Start: 2024-02-15 | End: 2024-03-05 | Stop reason: HOSPADM

## 2024-02-15 RX ORDER — FUROSEMIDE 40 MG/1
80 TABLET ORAL
Status: DISCONTINUED | OUTPATIENT
Start: 2024-02-15 | End: 2024-02-15 | Stop reason: HOSPADM

## 2024-02-15 RX ORDER — POTASSIUM CHLORIDE 20 MEQ/1
40 TABLET, EXTENDED RELEASE ORAL EVERY 4 HOURS
Qty: 2 TABLET | Refills: 0 | OUTPATIENT
Start: 2024-02-15 | End: 2024-02-16

## 2024-02-15 RX ADMIN — Medication 2 L/MIN: at 07:57

## 2024-02-15 RX ADMIN — HEPARIN SODIUM 5000 UNITS: 5000 INJECTION INTRAVENOUS; SUBCUTANEOUS at 04:36

## 2024-02-15 RX ADMIN — AMIODARONE HYDROCHLORIDE 200 MG: 200 TABLET ORAL at 09:21

## 2024-02-15 RX ADMIN — PANTOPRAZOLE SODIUM 40 MG: 40 TABLET, DELAYED RELEASE ORAL at 09:22

## 2024-02-15 RX ADMIN — LEVOTHYROXINE SODIUM 200 MCG: 100 TABLET ORAL at 09:20

## 2024-02-15 RX ADMIN — IPRATROPIUM BROMIDE AND ALBUTEROL SULFATE 3 ML: 2.5; .5 SOLUTION RESPIRATORY (INHALATION) at 07:57

## 2024-02-15 RX ADMIN — POTASSIUM CHLORIDE 40 MEQ: 1500 TABLET, EXTENDED RELEASE ORAL at 09:21

## 2024-02-15 RX ADMIN — IPRATROPIUM BROMIDE AND ALBUTEROL SULFATE 3 ML: 2.5; .5 SOLUTION RESPIRATORY (INHALATION) at 13:06

## 2024-02-15 RX ADMIN — LEVOTHYROXINE SODIUM 50 MCG: 50 TABLET ORAL at 09:21

## 2024-02-15 RX ADMIN — ASPIRIN 81 MG: 81 TABLET, COATED ORAL at 09:22

## 2024-02-15 RX ADMIN — POTASSIUM CHLORIDE 40 MEQ: 1500 TABLET, EXTENDED RELEASE ORAL at 13:32

## 2024-02-15 RX ADMIN — ATORVASTATIN CALCIUM 40 MG: 40 TABLET, FILM COATED ORAL at 09:22

## 2024-02-15 RX ADMIN — FUROSEMIDE 80 MG: 40 TABLET ORAL at 09:27

## 2024-02-15 RX ADMIN — HEPARIN SODIUM 5000 UNITS: 5000 INJECTION INTRAVENOUS; SUBCUTANEOUS at 13:31

## 2024-02-15 RX ADMIN — VENLAFAXINE HYDROCHLORIDE 75 MG: 75 CAPSULE, EXTENDED RELEASE ORAL at 09:21

## 2024-02-15 RX ADMIN — CLOPIDOGREL 75 MG: 75 TABLET ORAL at 09:22

## 2024-02-15 RX ADMIN — AMLODIPINE BESYLATE 10 MG: 5 TABLET ORAL at 09:21

## 2024-02-15 RX ADMIN — METOPROLOL SUCCINATE 50 MG: 50 TABLET, EXTENDED RELEASE ORAL at 09:22

## 2024-02-15 RX ADMIN — FERROUS GLUCONATE 324 MG: 324 TABLET ORAL at 09:22

## 2024-02-15 ASSESSMENT — COGNITIVE AND FUNCTIONAL STATUS - GENERAL
DRESSING REGULAR LOWER BODY CLOTHING: A LOT
MOBILITY SCORE: 12
DAILY ACTIVITIY SCORE: 17
TURNING FROM BACK TO SIDE WHILE IN FLAT BAD: A LOT
MOVING TO AND FROM BED TO CHAIR: A LOT
STANDING UP FROM CHAIR USING ARMS: A LOT
TOILETING: A LITTLE
MOVING FROM LYING ON BACK TO SITTING ON SIDE OF FLAT BED WITH BEDRAILS: A LOT
EATING MEALS: A LITTLE
HELP NEEDED FOR BATHING: A LITTLE
CLIMB 3 TO 5 STEPS WITH RAILING: A LOT
DRESSING REGULAR UPPER BODY CLOTHING: A LITTLE
PERSONAL GROOMING: A LITTLE
WALKING IN HOSPITAL ROOM: A LOT

## 2024-02-15 ASSESSMENT — PAIN SCALES - PAIN ASSESSMENT IN ADVANCED DEMENTIA (PAINAD)
FACIALEXPRESSION: SMILING OR INEXPRESSIVE
BODYLANGUAGE: RELAXED
TOTALSCORE: 0
CONSOLABILITY: NO NEED TO CONSOLE
BREATHING: NORMAL

## 2024-02-15 ASSESSMENT — PAIN SCALES - GENERAL: PAINLEVEL_OUTOF10: 0 - NO PAIN

## 2024-02-15 ASSESSMENT — PAIN SCALES - WONG BAKER: WONGBAKER_NUMERICALRESPONSE: NO HURT

## 2024-02-15 NOTE — CARE PLAN
The patient's goals for the shift include      The clinical goals for the shift include Pt will remain HDS this shift.    Over the shift, the patient did make progress toward the following goals. Preparing to discharge.

## 2024-02-15 NOTE — PROGRESS NOTES
Occupational Therapy                 Therapy Communication Note    Patient Name: Daphne Morris  MRN: [id number]  Today's Date: 2/15/2024     Discipline: Occupational Therapy    Missed Visit Reason: Patient in a medical procedure (Pt off floor in cardiac cath lab unavailable for treatment.)    Missed Time: Attempt

## 2024-02-15 NOTE — DISCHARGE SUMMARY
"Discharge Diagnosis  CHF (congestive heart failure), NYHA class I, acute on chronic, combined (CMS/[facility])    Issues Requiring Follow-Up  Follow up with pcp, cardiology, nephrology    Discharge Meds     Your medication list        START taking these medications        Instructions Last Dose Given Next Dose Due   furosemide 80 mg tablet  Commonly known as: Lasix      Take 1 tablet (80 mg) by mouth 2 times a day.              CONTINUE taking these medications        Instructions Last Dose Given Next Dose Due   albuterol 90 mcg/actuation inhaler           amiodarone 200 mg tablet  Commonly known as: Pacerone           amLODIPine 5 mg tablet  Commonly known as: Norvasc           aspirin 81 mg EC tablet           atorvastatin 40 mg tablet  Commonly known as: Lipitor           BD Calista 2nd Gen Pen Needle 32 gauge x 5/32\" needle  Generic drug: pen needle, diabetic           clopidogrel 75 mg tablet  Commonly known as: Plavix           ferrous gluconate 324 (38 Fe) mg tablet  Commonly known as: Fergon      Take 1 tablet (324 mg) by mouth once daily with breakfast.       FreeStyle Shakir 14 Day Sensor kit  Generic drug: flash glucose sensor kit           FreeStyle Lite Strips strip  Generic drug: blood sugar diagnostic           HumaLOG KwikPen Insulin 100 unit/mL injection  Generic drug: insulin lispro           ipratropium-albuteroL 0.5-2.5 mg/3 mL nebulizer solution  Commonly known as: Duo-Neb           Januvia 100 mg tablet  Generic drug: SITagliptin phosphate           Lantus Solostar U-100 Insulin 100 unit/mL (3 mL) pen  Generic drug: insulin glargine           levothyroxine 50 mcg tablet  Commonly known as: Synthroid, Levoxyl           levothyroxine 200 mcg tablet  Commonly known as: Synthroid, Levoxyl      Take 1 tablet (200 mcg) by mouth once daily in the morning. Take before meals.       metoprolol succinate XL 50 mg 24 hr tablet  Commonly known as: Toprol-XL      TAKE ONE TABLET BY MOUTH TWO TIMES A DAY       oxygen " gas therapy  Commonly known as: O2           pantoprazole 40 mg EC tablet  Commonly known as: ProtoNix      Take 1 tablet (40 mg) by mouth 2 times a day.       potassium chloride CR 20 mEq ER tablet  Commonly known as: Klor-Con M20      TAKE ONE TABLET BY MOUTH EVERY DAY WITH FOOD       Soaanz 60 mg tablet  Generic drug: torsemide      TAKE ONE TABLET BY MOUTH DAILY       venlafaxine XR 75 mg 24 hr capsule  Commonly known as: Effexor-XR                     Where to Get Your Medications        These medications were sent to [id number] - [place], [place] - [address]  [address] [address]      Phone: [phone]   furosemide 80 mg tablet  Soaanz 60 mg tablet         Test Results Pending At Discharge  Pending Labs       Order Current Status    AFB Culture/Smear Preliminary result    Fungal Culture/Smear Preliminary result            Hospital Course   67 yo F with a hx of CHF, a fib, COPD, prior CVA, who was admitted through the ED, who was admitted with acute CHF exacerbation with acute hypoxic respiratory failure. Cardiology consulted. Nephro consulted for ANDREA, cardiorenal syndrome. She was treated with IV lasix but had poor urine output. CXR showed large pleural effusion with R sided white-out; pulmonology consulted and pt taken for thoracentesis. They also felt she was in a COPD exacerbation so she was started on steroids and nebs. Cards took her for diagnostic RHC which showed mild pulmonary HTN. Diuresis continued. Creatinine started to downtrend and her edema started to improve with improved urine output. On 2/13 she was felt to be medically stable for discharge. She was transitioned to PO lasix on 2/14 and transport set up to take her to SNF on 2/15.     Pertinent Physical Exam At Time of Discharge  Physical Exam  Vitals reviewed.   Constitutional:       General: She is not in acute distress.     Appearance: Normal appearance. She is not ill-appearing.   HENT:       Head: Normocephalic and atraumatic.   Cardiovascular:      Rate and Rhythm: Normal rate and regular rhythm.      Heart sounds: No murmur heard.  Pulmonary:      Effort: Pulmonary effort is normal. No respiratory distress.      Breath sounds: No wheezing or rhonchi.   Abdominal:      General: Bowel sounds are normal. There is no distension.      Palpations: Abdomen is soft.      Tenderness: There is no abdominal tenderness.   Musculoskeletal:         General: No swelling.      Right lower leg: Edema (1+) present.      Left lower leg: Edema (1+) present.   Skin:     General: Skin is warm and dry.      Coloration: Skin is not jaundiced.      Findings: No erythema.   Neurological:      General: No focal deficit present.      Mental Status: She is alert and oriented to person, place, and time.      Cranial Nerves: No cranial nerve deficit.   Psychiatric:         Mood and Affect: Mood normal.         Thought Content: Thought content normal.         Outpatient Follow-Up  No future appointments.      J Carlos Garcia, DO

## 2024-02-15 NOTE — CARE PLAN
The patient's goals for the shift include      The clinical goals for the shift include pt will remain hds    Over the shift, the patient did not make progress toward the following goals. Barriers to progression include none. Recommendations to address these barriers include continue with plan of care.

## 2024-02-15 NOTE — PROGRESS NOTES
Daphne Morris is a 68 y.o. female on day 13 of admission presenting with CHF (congestive heart failure), NYHA class I, acute on chronic, combined (CMS/HCC).      Subjective   Patient was seen and examined.  Fully awake and alert.  She is sitting in bed comfortably. Serum creatinine improved and level of around 2.7.  Good urine output.  Not uremic or significantly hypervolemic on exam.  She is eager to leave the hospital    Objective          Vitals 24HR  Heart Rate:  [71-81]   Temp:  [36.1 °C (96.9 °F)-37.6 °C (99.7 °F)]   Resp:  [19-21]   BP: (146-164)/(63-71)   Weight:  [95.8 kg (211 lb 3.2 oz)]   SpO2:  [94 %-97 %]     Intake/Output last 3 Shifts:    Intake/Output Summary (Last 24 hours) at 2/15/2024 1209  Last data filed at 2/15/2024 1104  Gross per 24 hour   Intake 960 ml   Output 400 ml   Net 560 ml         Physical Exam        General appearance: no distress awake and alert on room air, no significant hypervolemia in exam  Eyes: non-icteric  HEENT: atrumatic head, PEERLA, moist mucosa  Skin: no apparent rash  Heart: NSR, S1, S2 normal, no murmur or gallop  Lungs: Symmetrical expansion,CTA bilat no wheezing/crackles  Abdomen: soft, nt/nd, obese  Extremities: no significant edema bilat  Neuro: No FND,asterixis, no focal deficits noticed        RFP  Recent Labs     02/15/24  0658 02/13/24  1808 02/13/24  0538 02/12/24  0537 02/11/24  0559 02/10/24  0542 02/08/24  0931 02/07/24  0607 02/06/24  0543 02/05/24  0555 02/04/24  0505 02/03/24  0508 02/02/24  0438 07/26/23  1727 07/23/23  0530    142 140 140 143 142 140   < > 139 139 139 135* 142   < > 136   K 2.9* 3.3* 3.2* 3.7 3.5 3.7 4.1   < > 4.8 4.5 5.0 5.0 4.9   < > 3.6    98 100 101 105 104 102   < > 101 103 104 101 106   < > 96*   CO2 35* 32 32 29 30 30 31   < > 26 28 26 30 31   < > 33*   BUN 58* 67* 69* 72* 75* 79* 74*   < > 68* 61* 56* 50* 47*   < > 46*   CREATININE 2.66* 2.79* 2.87* 3.03* 3.27* 3.41* 3.58*   < > 3.67* 3.67* 3.49* 3.28* 3.21*   <  > 2.90*   GLUCOSE 72* 211* 128* 244* 46* 121* 120*   < > 217* 181* 189* 190* 183*   < > 98   CALCIUM 8.0* 7.6* 7.4* 7.3* 7.3* 7.3* 7.4*   < > 7.5* 7.6* 7.3* 7.2* 7.5*   < > 8.1*   ALBUMIN  --  2.4*  --  2.3*  --   --   --   --  2.5* 2.4* 2.2* 2.0* 2.4*   < > 2.6*   PHOS  --  3.9  --   --   --   --   --   --  6.3* 6.1* 6.0* 5.8* 6.0*  --  4.2   EGFR 19* 18* 17* 16* 15* 14* 13*   < > 13* 13* 14* 15* 15*   < >  --    ANIONGAP 9* 15 11 14 12 12 11   < > 17 13 14 9* 10   < > 11    < > = values in this interval not displayed.          Urineanalysis  Recent Labs     02/09/24  1248 02/01/24  0515 07/19/23  1437 04/26/23  1740 05/03/21  2035 04/23/21  0310 04/06/21  1200 04/10/18  1415   COLORU Yellow Yellow STRAW YELLOW YELLOW YELLOW PALE YELLOW YELLOW   APPEARANCEU Clear Clear CLEAR CLEAR CLEAR HAZY HAZY CLOUDY   SPECGRAVU 1.011 1.013 1.008 1.009 1.012 1.020 1.005 1.017   SYLVIE 5.0 5.0 6.0 6.0 6.5 6.0 6.5 5.5   PROTUR >=500 (3+)* >=500 (3+)* >=500 (3+)* 100(2+)* 300* 600* 100* 30*   GLUCOSEU 150 (2+)* >=500 (3+)* 150 (2+)* 50(1+)* 100* 150* 50* 300*   BLOODU MODERATE (2+)* NEGATIVE LARGE (3+)* SMALL(1+)* TRACE* TRACE* MODERATE* TRACE*   KETONESU NEGATIVE NEGATIVE NEGATIVE NEGATIVE NEGATIVE NEGATIVE NEGATIVE NEGATIVE   BILIRUBINU NEGATIVE NEGATIVE NEGATIVE NEGATIVE NEGATIVE NEGATIVE NEGATIVE NEGATIVE   NITRITEU NEGATIVE NEGATIVE NEGATIVE NEGATIVE NEGATIVE NEGATIVE POSITIVE* NEGATIVE   LEUKOCYTESU TRACE* NEGATIVE NEGATIVE NEGATIVE NEGATIVE SMALL* LARGE* LARGE*         Urine Electrolytes  Recent Labs     02/09/24  1248 02/01/24  0515 07/19/23  1437 04/30/23  1316 04/26/23  1740 05/03/21  2035 04/23/21  0310 04/06/21  1200 04/10/18  1415   SODIUMURR  --  40  --  27  --   --   --   --   --    NACREATUR  --  75  --  27  --   --   --   --   --    KUR  --  64  --   --   --   --   --   --   --    KCREATUR  --  119  --   --   --   --   --   --   --    CREATU  --  53.6  53.6  --  101.0  --   --   --   --   --    PROTUR >=500 (3+)*  >=500 (3+)* >=500 (3+)*  --  100(2+)* 300* 600* 100* 30*   ALBUMINUR  --  >2,250.0  --   --   --   --   --   --   --           Urine Micro  Recent Labs     02/09/24  1248 02/01/24  0515 07/19/23  1437 04/26/23  1740 05/03/21  2035 04/23/21  0310 04/06/21  1200 04/10/18  1415   WBCU 6-10* 1-5 5* 1 NONE SEEN 53* LOADED 1,077*   RBCU 11-20* 3-5 >182* 1 2-4 5* 16-25 5*   HYALCASTU 2+* OCCASIONAL*  --   --   --  70  --  3   SQUAMEPIU 1-9 (SPARSE)  --  1 1  --  MODERATE  --  FEW   BACTERIAU 1+* 1+* 1+*  --   --  POSITIVE PRESENT  Performed at [facility] [address] [facility] [address]   POSITIVE   MUCUSU FEW FEW FEW  --   --   --   --   --           Iron  Recent Labs     05/02/23  0607 04/28/23  0615 04/27/23  0624 02/23/23  1113   IRON  --   --  26* 54   TIBC  --   --  289 281   IRONSAT  --   --  9* 19.2   FERRITIN 71 80  --  42         amiodarone, 200 mg, oral, Daily with breakfast  amLODIPine, 10 mg, oral, Daily  aspirin, 81 mg, oral, Daily  atorvastatin, 40 mg, oral, Daily  bisacodyl, 10 mg, rectal, Daily  clopidogrel, 75 mg, oral, Daily  dextrose, 25 g, intravenous, Once  ferrous gluconate, 324 mg, oral, Daily with breakfast  furosemide, 80 mg, oral, BID with meals  heparin (porcine), 5,000 Units, subcutaneous, q8h  insulin glargine, 8 Units, subcutaneous, Nightly  insulin lispro, 0-15 Units, subcutaneous, TID with meals  ipratropium-albuteroL, 3 mL, nebulization, TID  lactulose, 20 g, oral, Daily  lactulose, 20 g, oral, Daily  levothyroxine, 200 mcg, oral, Daily before breakfast  levothyroxine, 50 mcg, oral, Daily before breakfast  metoprolol succinate XL, 50 mg, oral, Daily  pantoprazole, 40 mg, oral, BID  [Held by provider] potassium chloride CR, 20 mEq, oral, Daily  potassium chloride CR, 40 mEq, oral, q4h  [Held by provider] SITagliptin phosphate, 25 mg, oral, Daily  venlafaxine XR, 75 mg, oral, Daily             Assessment/Plan                Principal Problem:    CHF (congestive heart failure),  NYHA class I, acute on chronic, combined (CMS/[facility])           Daphne Morris  is a 68 y.o. female who has past medical history of  heart failure, atrial fibrillation s/p Watchman, right nephrectomy, hypertension, type 2 DM admitted for acute hypoxic respiratory failure 2/2 to pneumonia and acute decompensated heart failure.        # ANDREA  -Likely  Cardiorenal syndrome 2/2 Acute decompensated CHF  -(CKD) 4/A3-baseline SCr 1.8-2.5 most likely atherosclerotic cardiovascular disease/diabetic nephropathy, prior nephrectomy.  She follows with Dr. Mitchell  - Peaked Scr during this admission 3-3.2, GFR 15 and has been plateauing around 2.7  - Urine analysis showed 3+ protien in urine, negative blood, 3-5 RBCs, 1-5 WBCs  -Kidney ultrasound with no obstruction on the left side.  Right total nephrectomy noticed  -Currently on Lasix 80 mg IV twice daily with good urine output and within normal electrolytes  -Urine lites with no evidence for prerenal injury     Plan  -No indication to immediately initiate dialysis however she might need in the future.  Currently kidney function is poor stable and within normal electrolytes  -Consider transitioning to p.o. diuretics.  I suggest Lasix 80 mg p.o. twice daily to start with (this is equal to half what she is getting currently 80 mg IV twice daily).  Will defer diuretic management to cardiology team  - strict I/Os, daily weight  - avoid contrast if possible  - would recommend starting an SGLT2 inhibitor outpatient if GFR returns to >25  -No need for Fay catheter from kidney standpoint-this can be removed prior to discharge with voiding trial     Okay to discharge from kidney standpoint.  I informed her primary nephrologist Dr. Mitchell about this admission and needs to follow-up with him.  Patient is aware too    John Pena MD

## 2024-02-16 NOTE — NURSING NOTE
Pt discharged to [facility]. Transported via [facility]. IV and tele removed. Left floor with belongings at 1910. Report called to [facility].

## 2024-02-18 ENCOUNTER — APPOINTMENT (OUTPATIENT)
Dept: CARDIOLOGY | Facility: HOSPITAL | Age: 69
End: 2024-02-18
Payer: MEDICARE

## 2024-02-18 ENCOUNTER — APPOINTMENT (OUTPATIENT)
Dept: RADIOLOGY | Facility: HOSPITAL | Age: 69
End: 2024-02-18
Payer: MEDICARE

## 2024-02-18 ENCOUNTER — HOSPITAL ENCOUNTER (EMERGENCY)
Facility: HOSPITAL | Age: 69
Discharge: ANOTHER HOSPITAL | End: 2024-02-19
Attending: EMERGENCY MEDICINE
Payer: MEDICARE

## 2024-02-18 ENCOUNTER — NURSING HOME VISIT (OUTPATIENT)
Dept: POST ACUTE CARE | Facility: EXTERNAL LOCATION | Age: 69
End: 2024-02-18

## 2024-02-18 DIAGNOSIS — Z91.81 AT RISK FOR FALLING: ICD-10-CM

## 2024-02-18 DIAGNOSIS — R41.82 ALTERED MENTAL STATUS, UNSPECIFIED ALTERED MENTAL STATUS TYPE: Primary | ICD-10-CM

## 2024-02-18 DIAGNOSIS — E03.9 HYPOTHYROIDISM, UNSPECIFIED TYPE: ICD-10-CM

## 2024-02-18 DIAGNOSIS — J44.9 COPD ON LONG-TERM INHALED STEROID THERAPY (MULTI): ICD-10-CM

## 2024-02-18 DIAGNOSIS — R41.0 CONFUSION: ICD-10-CM

## 2024-02-18 DIAGNOSIS — E78.5 HYPERLIPIDEMIA, UNSPECIFIED HYPERLIPIDEMIA TYPE: ICD-10-CM

## 2024-02-18 DIAGNOSIS — I50.21 ACUTE SYSTOLIC CONGESTIVE HEART FAILURE (MULTI): Primary | ICD-10-CM

## 2024-02-18 DIAGNOSIS — N18.9 CHRONIC KIDNEY DISEASE, UNSPECIFIED CKD STAGE: ICD-10-CM

## 2024-02-18 DIAGNOSIS — R09.02 HYPOXIA: ICD-10-CM

## 2024-02-18 DIAGNOSIS — E11.9 TYPE 2 DIABETES MELLITUS WITHOUT COMPLICATION, WITH LONG-TERM CURRENT USE OF INSULIN (MULTI): ICD-10-CM

## 2024-02-18 DIAGNOSIS — F32.A DEPRESSION, UNSPECIFIED DEPRESSION TYPE: ICD-10-CM

## 2024-02-18 DIAGNOSIS — R53.1 WEAKNESS: ICD-10-CM

## 2024-02-18 DIAGNOSIS — I63.9 CEREBROVASCULAR ACCIDENT (CVA), UNSPECIFIED MECHANISM (MULTI): ICD-10-CM

## 2024-02-18 DIAGNOSIS — Z79.51 COPD ON LONG-TERM INHALED STEROID THERAPY (MULTI): ICD-10-CM

## 2024-02-18 DIAGNOSIS — I48.91 ATRIAL FIBRILLATION, UNSPECIFIED TYPE (MULTI): ICD-10-CM

## 2024-02-18 DIAGNOSIS — Z79.4 TYPE 2 DIABETES MELLITUS WITHOUT COMPLICATION, WITH LONG-TERM CURRENT USE OF INSULIN (MULTI): ICD-10-CM

## 2024-02-18 DIAGNOSIS — I48.91 ATRIAL FIBRILLATION WITH RAPID VENTRICULAR RESPONSE (MULTI): ICD-10-CM

## 2024-02-18 DIAGNOSIS — I10 HYPERTENSION, UNSPECIFIED TYPE: ICD-10-CM

## 2024-02-18 DIAGNOSIS — D64.9 ANEMIA, UNSPECIFIED TYPE: ICD-10-CM

## 2024-02-18 DIAGNOSIS — I50.9 CONGESTIVE HEART FAILURE, UNSPECIFIED HF CHRONICITY, UNSPECIFIED HEART FAILURE TYPE (MULTI): ICD-10-CM

## 2024-02-18 LAB
ALBUMIN SERPL BCP-MCNC: 2.8 G/DL (ref 3.4–5)
ALP SERPL-CCNC: 77 U/L (ref 33–136)
ALT SERPL W P-5'-P-CCNC: 15 U/L (ref 7–45)
ANION GAP BLDA CALCULATED.4IONS-SCNC: 5 MMO/L (ref 10–25)
ANION GAP BLDV CALCULATED.4IONS-SCNC: 8 MMOL/L (ref 10–25)
ANION GAP SERPL CALC-SCNC: 10 MMOL/L (ref 10–20)
AST SERPL W P-5'-P-CCNC: 23 U/L (ref 9–39)
BASE EXCESS BLDA CALC-SCNC: 5.2 MMOL/L (ref -2–3)
BASE EXCESS BLDV CALC-SCNC: -3.7 MMOL/L (ref -2–3)
BASOPHILS # BLD AUTO: 0.03 X10*3/UL (ref 0–0.1)
BASOPHILS NFR BLD AUTO: 0.4 %
BILIRUB SERPL-MCNC: 0.3 MG/DL (ref 0–1.2)
BNP SERPL-MCNC: 1444 PG/ML (ref 0–99)
BODY TEMPERATURE: ABNORMAL
BODY TEMPERATURE: ABNORMAL
BUN SERPL-MCNC: 58 MG/DL (ref 6–23)
CA-I BLDA-SCNC: 1.17 MMOL/L (ref 1.1–1.33)
CA-I BLDV-SCNC: 0.86 MMOL/L (ref 1.1–1.33)
CALCIUM SERPL-MCNC: 8.4 MG/DL (ref 8.6–10.3)
CARDIAC TROPONIN I PNL SERPL HS: 40 NG/L (ref 0–13)
CARDIAC TROPONIN I PNL SERPL HS: 43 NG/L (ref 0–13)
CHLORIDE BLDA-SCNC: 101 MMOL/L (ref 98–107)
CHLORIDE BLDV-SCNC: 114 MMOL/L (ref 98–107)
CHLORIDE SERPL-SCNC: 98 MMOL/L (ref 98–107)
CO2 SERPL-SCNC: 35 MMOL/L (ref 21–32)
CREAT SERPL-MCNC: 2.88 MG/DL (ref 0.5–1.05)
D DIMER PPP FEU-MCNC: ABNORMAL NG/ML FEU
EGFRCR SERPLBLD CKD-EPI 2021: 17 ML/MIN/1.73M*2
EOSINOPHIL # BLD AUTO: 0.05 X10*3/UL (ref 0–0.7)
EOSINOPHIL NFR BLD AUTO: 0.7 %
ERYTHROCYTE [DISTWIDTH] IN BLOOD BY AUTOMATED COUNT: 13.7 % (ref 11.5–14.5)
GLUCOSE BLDA-MCNC: 128 MG/DL (ref 74–99)
GLUCOSE BLDV-MCNC: 98 MG/DL (ref 74–99)
GLUCOSE SERPL-MCNC: 150 MG/DL (ref 74–99)
HCO3 BLDA-SCNC: 32.5 MMOL/L (ref 22–26)
HCO3 BLDV-SCNC: 22.6 MMOL/L (ref 22–26)
HCT VFR BLD AUTO: 26.6 % (ref 36–46)
HCT VFR BLD EST: 16 % (ref 36–46)
HCT VFR BLD EST: 28 % (ref 36–46)
HGB BLD-MCNC: 8 G/DL (ref 12–16)
HGB BLDA-MCNC: 9.3 G/DL (ref 12–16)
HGB BLDV-MCNC: 5.2 G/DL (ref 12–16)
IMM GRANULOCYTES # BLD AUTO: 0.07 X10*3/UL (ref 0–0.7)
IMM GRANULOCYTES NFR BLD AUTO: 1 % (ref 0–0.9)
INHALED O2 CONCENTRATION: 100 %
INHALED O2 CONCENTRATION: 40 %
LACTATE BLDA-SCNC: 0.4 MMOL/L (ref 0.4–2)
LACTATE BLDV-SCNC: 0.3 MMOL/L (ref 0.4–2)
LYMPHOCYTES # BLD AUTO: 0.49 X10*3/UL (ref 1.2–4.8)
LYMPHOCYTES NFR BLD AUTO: 7.3 %
MCH RBC QN AUTO: 31.3 PG (ref 26–34)
MCHC RBC AUTO-ENTMCNC: 30.1 G/DL (ref 32–36)
MCV RBC AUTO: 104 FL (ref 80–100)
MONOCYTES # BLD AUTO: 0.69 X10*3/UL (ref 0.1–1)
MONOCYTES NFR BLD AUTO: 10.3 %
NEUTROPHILS # BLD AUTO: 5.4 X10*3/UL (ref 1.2–7.7)
NEUTROPHILS NFR BLD AUTO: 80.3 %
NRBC BLD-RTO: 0 /100 WBCS (ref 0–0)
OXYHGB MFR BLDA: 86.6 % (ref 94–98)
OXYHGB MFR BLDV: 68.4 % (ref 45–75)
PCO2 BLDA: 63 MM HG (ref 38–42)
PCO2 BLDV: 48 MM HG (ref 41–51)
PH BLDA: 7.32 PH (ref 7.38–7.42)
PH BLDV: 7.28 PH (ref 7.33–7.43)
PLATELET # BLD AUTO: 234 X10*3/UL (ref 150–450)
PO2 BLDA: 56 MM HG (ref 85–95)
PO2 BLDV: 40 MM HG (ref 35–45)
POTASSIUM BLDA-SCNC: 5.9 MMOL/L (ref 3.5–5.3)
POTASSIUM BLDV-SCNC: 3.6 MMOL/L (ref 3.5–5.3)
POTASSIUM SERPL-SCNC: 5.2 MMOL/L (ref 3.5–5.3)
PROT SERPL-MCNC: 6.2 G/DL (ref 6.4–8.2)
RBC # BLD AUTO: 2.56 X10*6/UL (ref 4–5.2)
SAO2 % BLDA: 88 % (ref 94–100)
SAO2 % BLDV: 71 % (ref 45–75)
SODIUM BLDA-SCNC: 133 MMOL/L (ref 136–145)
SODIUM BLDV-SCNC: 141 MMOL/L (ref 136–145)
SODIUM SERPL-SCNC: 138 MMOL/L (ref 136–145)
VENTILATOR MODE: ABNORMAL
WBC # BLD AUTO: 6.7 X10*3/UL (ref 4.4–11.3)

## 2024-02-18 PROCEDURE — 93970 EXTREMITY STUDY: CPT

## 2024-02-18 PROCEDURE — 85025 COMPLETE CBC W/AUTO DIFF WBC: CPT | Performed by: EMERGENCY MEDICINE

## 2024-02-18 PROCEDURE — 94660 CPAP INITIATION&MGMT: CPT

## 2024-02-18 PROCEDURE — 84132 ASSAY OF SERUM POTASSIUM: CPT | Performed by: EMERGENCY MEDICINE

## 2024-02-18 PROCEDURE — 94664 DEMO&/EVAL PT USE INHALER: CPT

## 2024-02-18 PROCEDURE — 93970 EXTREMITY STUDY: CPT | Performed by: RADIOLOGY

## 2024-02-18 PROCEDURE — 85379 FIBRIN DEGRADATION QUANT: CPT | Performed by: EMERGENCY MEDICINE

## 2024-02-18 PROCEDURE — 36600 WITHDRAWAL OF ARTERIAL BLOOD: CPT

## 2024-02-18 PROCEDURE — 99306 1ST NF CARE HIGH MDM 50: CPT | Performed by: INTERNAL MEDICINE

## 2024-02-18 PROCEDURE — 96374 THER/PROPH/DIAG INJ IV PUSH: CPT

## 2024-02-18 PROCEDURE — 71045 X-RAY EXAM CHEST 1 VIEW: CPT

## 2024-02-18 PROCEDURE — 36415 COLL VENOUS BLD VENIPUNCTURE: CPT | Performed by: EMERGENCY MEDICINE

## 2024-02-18 PROCEDURE — 83880 ASSAY OF NATRIURETIC PEPTIDE: CPT | Performed by: EMERGENCY MEDICINE

## 2024-02-18 PROCEDURE — 99291 CRITICAL CARE FIRST HOUR: CPT | Mod: 25

## 2024-02-18 PROCEDURE — 93005 ELECTROCARDIOGRAM TRACING: CPT

## 2024-02-18 PROCEDURE — 96375 TX/PRO/DX INJ NEW DRUG ADDON: CPT

## 2024-02-18 PROCEDURE — 2500000004 HC RX 250 GENERAL PHARMACY W/ HCPCS (ALT 636 FOR OP/ED): Performed by: EMERGENCY MEDICINE

## 2024-02-18 PROCEDURE — 99285 EMERGENCY DEPT VISIT HI MDM: CPT | Mod: 25

## 2024-02-18 PROCEDURE — 9420000001 HC RT PATIENT EDUCATION 5 MIN

## 2024-02-18 PROCEDURE — 94640 AIRWAY INHALATION TREATMENT: CPT

## 2024-02-18 PROCEDURE — 84484 ASSAY OF TROPONIN QUANT: CPT | Performed by: EMERGENCY MEDICINE

## 2024-02-18 PROCEDURE — 71045 X-RAY EXAM CHEST 1 VIEW: CPT | Performed by: RADIOLOGY

## 2024-02-18 RX ORDER — ENOXAPARIN SODIUM 100 MG/ML
1 INJECTION SUBCUTANEOUS ONCE
Status: COMPLETED | OUTPATIENT
Start: 2024-02-18 | End: 2024-02-19

## 2024-02-18 RX ORDER — FUROSEMIDE 10 MG/ML
40 INJECTION INTRAMUSCULAR; INTRAVENOUS ONCE
Status: COMPLETED | OUTPATIENT
Start: 2024-02-18 | End: 2024-02-18

## 2024-02-18 RX ADMIN — FUROSEMIDE 40 MG: 10 INJECTION, SOLUTION INTRAMUSCULAR; INTRAVENOUS at 18:07

## 2024-02-18 ASSESSMENT — PAIN SCALES - GENERAL: PAINLEVEL_OUTOF10: 0 - NO PAIN

## 2024-02-18 ASSESSMENT — PAIN - FUNCTIONAL ASSESSMENT: PAIN_FUNCTIONAL_ASSESSMENT: 0-10

## 2024-02-18 NOTE — LETTER
Patient: Daphne Morris  : 1955    Encounter Date: 2024    PLACE OF SERVICE:  [facility]    This is new/initial history and physical.    Subjective  Patient ID: Daphne Morris is a 68 y.o. female who presents for Follow-up.    Ms. Cecelia Morris is a 68-year-old female with history of congestive heart failure, with pulmonary edema.  She suffers from COPD.  She has several medical issues, unable to care for herself.  Today, she appears somewhat confused and appears to have a mental status change.    Review of Systems   Constitutional:  Negative for chills and fever.   Cardiovascular:  Negative for chest pain.   All other systems reviewed and are negative.    Objective  /84   Pulse 88   Temp 36.7 °C (98 °F)   Resp 18     Physical Exam  Vitals reviewed.   Constitutional:       Comments: This is a well-developed, well-nourished female, lying in bed, appearing weak and lethargic.   HENT:      Right Ear: Tympanic membrane, ear canal and external ear normal.      Left Ear: Tympanic membrane, ear canal and external ear normal.   Eyes:      General: No scleral icterus.     Pupils: Pupils are equal, round, and reactive to light.   Neck:      Vascular: No carotid bruit.   Cardiovascular:      Heart sounds: Normal heart sounds, S1 normal and S2 normal. No murmur heard.     No friction rub.   Pulmonary:      Effort: Pulmonary effort is normal.      Breath sounds: Decreased breath sounds (throughout.) present.   Abdominal:      Palpations: There is no hepatomegaly, splenomegaly or mass.   Musculoskeletal:         General: No swelling or deformity. Normal range of motion.      Cervical back: Neck supple.      Right lower leg: Edema present.      Left lower leg: Edema present.      Comments: Extremities show right-sided weakness.   Lymphadenopathy:      Cervical: No cervical adenopathy.      Upper Body:      Right upper body: No axillary adenopathy.      Left upper  body: No axillary adenopathy.      Lower Body: No right inguinal adenopathy. No left inguinal adenopathy.   Neurological:      Mental Status: She is oriented to person, place, and time.      Cranial Nerves: Cranial nerves 2-12 are intact. No cranial nerve deficit.      Sensory: No sensory deficit.      Motor: Motor function is intact. No weakness.      Gait: Gait is intact.      Deep Tendon Reflexes: Reflexes normal.   Psychiatric:         Mood and Affect: Mood normal. Mood is not anxious or depressed. Affect is not angry.         Behavior: Behavior is not agitated.         Thought Content: Thought content normal.         Judgment: Judgment normal.      Comments: She appears confused with mental status change.     LAB WORK: Laboratory studies were reviewed.    Assessment/Plan  Problem List Items Addressed This Visit             ICD-10-CM       Cardiac and Vasculature    Hypertension I10    Hyperlipidemia, unspecified E78.5       Endocrine/Metabolic    Hypothyroidism E03.9    Type 2 diabetes mellitus (CMS/[facility]) E11.9       Hematology and Neoplasia    Absolute anemia D64.9       Mental Health    Depression F32.A       Neuro    Altered mental status - Primary R41.82     Other Visit Diagnoses         Codes    Confusion     R41.0    Congestive heart failure, unspecified HF chronicity, unspecified heart failure type (CMS/[facility])     I50.9    COPD on long-term inhaled steroid therapy (CMS/[facility])     J44.9, Z79.51    Hypoxia     R09.02    Weakness     R53.1    At risk for falling     Z91.81    Chronic kidney disease, unspecified CKD stage     N18.9    Cerebrovascular accident (CVA), unspecified mechanism (CMS/HCC)     I63.9    Atrial fibrillation, unspecified type (CMS/HCC)     I48.91        1. Confusion with altered mental status.  The patient will go to the Emergency Room for evaluation.  2. Congestive heart failure, on diuretic.  3. COPD, on bronchodilator therapy.  4. Hypoxia, on oxygen.  5. Weakness, on PT/OT.  6. Fall risk,  fall precaution.  7. CKD, monitor BMP.  8. CVA, on supportive care.  9. Anemia, follow CBC.  10. Diabetes, on insulin.  11. Hypothyroidism, on levothyroxine.  12. Hyperlipidemia, on statin.  13. Depression, on medication.  14. Hypertension, med controlled.  15. Atrial fibrillation, rate controlled.    Scribe Attestation  By signing my name below, IAnn Scribe attest that this documentation has been prepared under the direction and in the presence of Cale Lowe MD.   .s      Electronically Signed By: Cale Lowe MD   2/20/24  3:39 PM

## 2024-02-18 NOTE — ED PROVIDER NOTES
[facility]  ED  Provider Note  2024  4:10 PM  ENT11/ENT11              History of Present Illness:   Daphne Morris is a 68 y.o. female presenting to the ED for shortness of breath, beginning earlier today.  The complaint has been persistent, moderate to severe in severity, and worsened by thing.  Patient was discharged from [facility] 2 days ago after returning for CHF with a large right pleural effusion requiring pleurocentesis.  She denies chest pain.  She denies change in medication or diet.  She was short of breath with a pulse ox of only 82% on ED arrival.      Review of Systems:   Pertinent positives and review of systems as noted above.  Remaining 10 review of systems is negative or noncontributory to today's episode of care.  Review of Systems       --------------------------------------------- PAST HISTORY ---------------------------------------------  Past Medical History:  has a past medical history of Acute CHF (CMS/HCC), Arthritis, Atrial fibrillation (CMS/HCC), Chronic diastolic CHF (congestive heart failure) (CMS/HCC), COPD (chronic obstructive pulmonary disease) (CMS/HCC), CVA (cerebral vascular accident) (CMS/HCC), Depression, Diabetes (CMS/HCC), Essential tremor, HTN (hypertension), Hyperlipidemia, Hypothyroidism, Impacted cerumen, left ear, Left ventricular ejection fraction greater than 40%, Noncompliance, Personal history of other diseases of the respiratory system, Renal carcinoma, right (CMS/HCC), and Vitamin D deficiency.    Past Surgical History:  has a past surgical history that includes MR angio chest w and wo IV contrast (2023); MR angio head wo IV contrast (2021);  section, classic; Shoulder surgery; Ankle surgery; Cataract extraction (Right); Nephrectomy (2021); and Cardiac catheterization (N/A, 2024).    Social History:  reports that she has been smoking cigarettes. She started smoking about 55 years ago. She has a 25.00 pack-year smoking  "history. She has never been exposed to tobacco smoke. She has never used smokeless tobacco. She reports that she does not currently use alcohol. She reports current drug use. Drug: Marijuana.    Family History: family history includes Asthma in her daughter; Cancer in her brother and sister; Diabetes in her daughter, father, and maternal grandfather; Heart attack in her daughter; Heart disease in her father and paternal grandmother; Hypertension in her mother. Unless otherwise noted, family history is non contributory    Patient's Medications   New Prescriptions    No medications on file   Previous Medications    ALBUTEROL 90 MCG/ACTUATION INHALER    Inhale 2 puffs every 4 hours if needed.    AMIODARONE (PACERONE) 200 MG TABLET    Take 1 tablet (200 mg) by mouth once daily with a meal.    AMLODIPINE (NORVASC) 5 MG TABLET    Take 1 tablet (5 mg) by mouth once daily.    ASPIRIN 81 MG EC TABLET    Take 1 tablet (81 mg) by mouth once daily.    ATORVASTATIN (LIPITOR) 40 MG TABLET    Take 1 tablet (40 mg) by mouth once daily.    BD ALBERT 2ND GEN PEN NEEDLE 32 GAUGE X 5/32\" NEEDLE    USE SUBCUTANEOUSLY AS DIRECTED TWICE DAILY    CLOPIDOGREL (PLAVIX) 75 MG TABLET    Take 1 tablet (75 mg) by mouth once daily.    FERROUS GLUCONATE 324 (38 FE) MG TABLET    Take 1 tablet (324 mg) by mouth once daily with breakfast.    FREESTYLE REMINGTON 14 DAY SENSOR KIT    USE AS DIRECTED TO CHECK SUGARS 3 TO 4 TIMES DAILY    FREESTYLE LITE STRIPS STRIP    twice a day.    FUROSEMIDE (LASIX) 80 MG TABLET    Take 1 tablet (80 mg) by mouth 2 times a day.    HUMALOG KWIKPEN INSULIN 100 UNIT/ML INJECTION    up to 15 units Subcutaneous three times a day per sliding scale    IPRATROPIUM-ALBUTEROL (DUO-NEB) 0.5-2.5 MG/3 ML NEBULIZER SOLUTION    Take 3 mL by nebulization every 6 hours if needed.    LANTUS SOLOSTAR U-100 INSULIN 100 UNIT/ML (3 ML) PEN    Inject 14 Units under the skin once daily at bedtime.    LEVOTHYROXINE (SYNTHROID, LEVOXYL) 200 MCG " TABLET    Take 1 tablet (200 mcg) by mouth once daily in the morning. Take before meals.    LEVOTHYROXINE (SYNTHROID, LEVOXYL) 50 MCG TABLET    Take 1 tablet (50 mcg) by mouth once daily in the morning. Take before meals.    METOPROLOL SUCCINATE XL (TOPROL-XL) 50 MG 24 HR TABLET    TAKE ONE TABLET BY MOUTH TWO TIMES A DAY    OXYGEN (O2) GAS THERAPY    2 l/min nasal cannula    PANTOPRAZOLE (PROTONIX) 40 MG EC TABLET    Take 1 tablet (40 mg) by mouth 2 times a day.    POTASSIUM CHLORIDE CR (KLOR-CON M20) 20 MEQ ER TABLET    Take 2 tablets (40 mEq) by mouth once daily. Do not crush or chew.    POTASSIUM CHLORIDE CR 20 MEQ ER TABLET    TAKE ONE TABLET BY MOUTH EVERY DAY WITH FOOD    SITAGLIPTIN PHOSPHATE (JANUVIA) 100 MG TABLET    Take 1 tablet (100 mg) by mouth once daily.    SOAANZ 60 MG TABLET    TAKE ONE TABLET BY MOUTH DAILY    VENLAFAXINE XR (EFFEXOR-XR) 75 MG 24 HR CAPSULE    Take 1 capsule (75 mg) by mouth once daily.   Modified Medications    No medications on file   Discontinued Medications    No medications on file      The patient’s home medications have been reviewed.    Allergies: Adhesive tape-silicones, Amoxicillin, Bee venom protein (honey bee), Codeine, Doxycycline, Latex, Lisinopril, Prednisone, Citalopram, Oseltamivir, and Phenyleph-shark oil-glyc-pet    -------------------------------------------------- RESULTS -------------------------------------------------  All laboratory and radiology results have been personally reviewed by myself   LABS:  Labs Reviewed   D-DIMER, VTE EXCLUSION - Abnormal       Result Value    D-Dimer, Quantitative VTE Exclusion 13,011 (*)     Narrative:     The VTE Exclusion D-Dimer assay is reported in ng/mL Fibrinogen Equivalent Units (FEU).    Per 's instructions for use, a value of less than 500 ng/mL (FEU) may help to exclude DVT or PE in outpatients when the assay is used with a clinical pretest probability assessment.(A[facility] must utilize and document eCalc  'Wells Score Deep Vein Thrombosis Risk' for DVT exclusion only. Emergency Department should utilize  Guidelines for Emergency Department Use of the VTE Exclusion D-Dimer and Clinical Pretest probability assessment model for DVT or PE exclusion.)   COMPREHENSIVE METABOLIC PANEL - Abnormal    Glucose 150 (*)     Sodium 138      Potassium 5.2      Chloride 98      Bicarbonate 35 (*)     Anion Gap 10      Urea Nitrogen 58 (*)     Creatinine 2.88 (*)     eGFR 17 (*)     Calcium 8.4 (*)     Albumin 2.8 (*)     Alkaline Phosphatase 77      Total Protein 6.2 (*)     AST 23      Bilirubin, Total 0.3      ALT 15     CBC WITH AUTO DIFFERENTIAL - Abnormal    WBC 6.7      nRBC 0.0      RBC 2.56 (*)     Hemoglobin 8.0 (*)     Hematocrit 26.6 (*)      (*)     MCH 31.3      MCHC 30.1 (*)     RDW 13.7      Platelets 234      Neutrophils % 80.3      Immature Granulocytes %, Automated 1.0 (*)     Lymphocytes % 7.3      Monocytes % 10.3      Eosinophils % 0.7      Basophils % 0.4      Neutrophils Absolute 5.40      Immature Granulocytes Absolute, Automated 0.07      Lymphocytes Absolute 0.49 (*)     Monocytes Absolute 0.69      Eosinophils Absolute 0.05      Basophils Absolute 0.03     SERIAL TROPONIN-INITIAL - Abnormal    Troponin I, High Sensitivity 43 (*)     Narrative:     Less than 99th percentile of normal range cutoff-  Female and children under 18 years old <14 ng/L; Male <21 ng/L: Negative  Repeat testing should be performed if clinically indicated.     Female and children under 18 years old 14-50 ng/L; Male 21-50 ng/L:  Consistent with possible cardiac damage and possible increased clinical   risk. Serial measurements may help to assess extent of myocardial damage.     >50 ng/L: Consistent with cardiac damage, increased clinical risk and  myocardial infarction. Serial measurements may help assess extent of   myocardial damage.      NOTE: Children less than 1 year old may have higher baseline troponin   levels and  results should be interpreted in conjunction with the overall   clinical context.     NOTE: Troponin I testing is performed using a different   testing methodology at [facility] than at other   [facility]. Direct result comparisons should only   be made within the same method.   SERIAL TROPONIN, 1 HOUR - Abnormal    Troponin I, High Sensitivity 40 (*)     Narrative:     Less than 99th percentile of normal range cutoff-  Female and children under 18 years old <14 ng/L; Male <21 ng/L: Negative  Repeat testing should be performed if clinically indicated.     Female and children under 18 years old 14-50 ng/L; Male 21-50 ng/L:  Consistent with possible cardiac damage and possible increased clinical   risk. Serial measurements may help to assess extent of myocardial damage.     >50 ng/L: Consistent with cardiac damage, increased clinical risk and  myocardial infarction. Serial measurements may help assess extent of   myocardial damage.      NOTE: Children less than 1 year old may have higher baseline troponin   levels and results should be interpreted in conjunction with the overall   clinical context.     NOTE: Troponin I testing is performed using a different   testing methodology at [facility] than at other   [facility]. Direct result comparisons should only   be made within the same method.   BLOOD GAS VENOUS FULL PANEL - Abnormal    POCT pH, Venous 7.28 (*)     POCT pCO2, Venous 48      POCT pO2, Venous 40      POCT SO2, Venous 71      POCT Oxy Hemoglobin, Venous 68.4      POCT Hematocrit Calculated, Venous 16.0 (*)     POCT Sodium, Venous 141      POCT Potassium, Venous 3.6      POCT Chloride, Venous 114 (*)     POCT Ionized Calicum, Venous 0.86 (*)     POCT Glucose, Venous 98      POCT Lactate, Venous 0.3 (*)     POCT Base Excess, Venous -3.7 (*)     POCT HCO3 Calculated, Venous 22.6      POCT Hemoglobin, Venous 5.2 (*)     POCT Anion Gap, Venous 8.0 (*)     Patient  Temperature        FiO2 100     B-TYPE NATRIURETIC PEPTIDE - Abnormal    BNP 1,444 (*)     Narrative:        <100 pg/mL - Heart failure unlikely  100-299 pg/mL - Intermediate probability of acute heart                  failure exacerbation. Correlate with clinical                  context and patient history.    >=300 pg/mL - Heart Failure likely. Correlate with clinical                  context and patient history.    BNP testing is performed using different testing methodology at [facility] than at other [facility]. Direct result comparisons should only be made within the same method.      BLOOD GAS ARTERIAL FULL PANEL - Abnormal    POCT pH, Arterial 7.32 (*)     POCT pCO2, Arterial 63 (*)     POCT pO2, Arterial 56 (*)     POCT SO2, Arterial 88 (*)     POCT Oxy Hemoglobin, Arterial 86.6 (*)     POCT Hematocrit Calculated, Arterial 28.0 (*)     POCT Sodium, Arterial 133 (*)     POCT Potassium, Arterial 5.9 (*)     POCT Chloride, Arterial 101      POCT Ionized Calcium, Arterial 1.17      POCT Glucose, Arterial 128 (*)     POCT Lactate, Arterial 0.4      POCT Base Excess, Arterial 5.2 (*)     POCT HCO3 Calculated, Arterial 32.5 (*)     POCT Hemoglobin, Arterial 9.3 (*)     POCT Anion Gap, Arterial 5 (*)     Patient Temperature        FiO2 40      Ventilator Mode BiPAP     TROPONIN SERIES- (INITIAL, 1 HR)    Narrative:     The following orders were created for panel order Troponin I Series, High Sensitivity (0, 1 HR).  Procedure                               Abnormality         Status                     ---------                               -----------         ------                     Troponin I, High Sensiti...[[id number]]  Abnormal            Final result               Troponin, High Sensitivi...[[id number]]  Abnormal            Final result                 Please view results for these tests on the individual orders.       EKG: Sinus rhythm at 61 bpm, noisy baseline, left axis deviation,  inferior infarct age undetermined, anterolateral infarct age undetermined, intraventricular conduction delay, no acute ST elevations.  Interpreted by KASH Avelar MD    RADIOLOGY:  Interpreted by Radiologist.  XR chest 1 view   Final Result   Findings of ongoing CHF.        Grossly stable pleural and parenchymal opacities in the mid and lower   right hemithorax. Mild new left pleural effusion with left basilar   atelectasis or edema.        MACRO:   None        Signed by: Erlin Lui 2/18/2024 4:59 PM   Dictation workstation:   [id number]          ------------------------- NURSING NOTES AND VITALS REVIEWED ---------------------------   The nursing notes within the ED encounter and vital signs as below have been reviewed.   There were no vitals taken for this visit.  Oxygen Saturation Interpretation: Normal      ---------------------------------------------------PHYSICAL EXAM--------------------------------------  Physical Exam   Constitutional/General: Alert and oriented x3, well appearing, non toxic in NAD  Head: Normocephalic and atraumatic  Eyes: PERRL, EOMI, conjunctiva normal, sclera non icteric  Mouth: Oropharynx clear, handling secretions, no trismus, no asymmetry of the posterior oropharynx or uvular edema  Neck: Supple, full ROM, non tender to palpation in the midline, no stridor, no crepitus, no meningeal signs  Respiratory: Lungs clear to auscultation bilaterally, no wheezes, rales, or rhonchi  Cardiovascular:  Regular rate. Regular rhythm. No murmurs, gallops, or rubs. 2+ distal pulses  Chest: No chest wall tenderness  GI:  Abdomen Soft, Non tender, Non distended.  +BS. No organomegaly, no palpable masses,  No rebound, guarding, or rigidity. No CVAT   Musculoskeletal: Moves all extremities x 4. Warm and well perfused, no clubbing, cyanosis, or edema. Capillary refill <3 seconds  Integument: skin warm and dry. No rashes.   Lymphatic: no lymphadenopathy noted  Neurologic:  No focal deficits, symmetric  strength 5/5 in the upper and lower extremities bilaterally  Psychiatric: Normal Affect    Procedures    ------------------------------ ED COURSE/MEDICAL DECISION MAKING----------------------  Diagnoses as of 02/25/24 1031   Acute systolic congestive heart failure (CMS/HCC)   Atrial fibrillation with rapid ventricular response (CMS/HCC)       Patient has diffuse edema of her upper and lower extremities and back.  Her x-ray shows congestive heart failure.  Her current O2 sat is 92% on BiPAP.  Her troponin is proximately 40 but unchanged on repeat.  I have requested transfer to [facility] for further care for ongoing CHF problem.      Medical Decision Making:   Patient has congestive heart failure.  She is stable for transfer.  A bed has been requested at [facility].  We are pending a call back.  This patient signed out to Dr. Bone pending transfer.    Patient signed to Dr. Carrera pending results of the ultrasound.  Patient is an ultrasound of her lower extremities pending.  If it is positive for DVT she is to be started on a heparin drip.  If it is negative for DVT she should be given a shot of subcu Lovenox.     Counseling:   The emergency provider has spoken with the patient and discussed today’s results, in addition to providing specific details for the plan of care and counseling regarding the diagnosis and prognosis.  Questions are answered at this time and they are agreeable with the plan.      --------------------------------- IMPRESSION AND DISPOSITION ---------------------------------        IMPRESSION  1. Acute systolic congestive heart failure (CMS/HCC)        DISPOSITION  Disposition: Transfer to [facility] to Telemetry  Patient condition is fair      CRITICAL CARE TIME     CRITICAL CARE :  The high probability of clinically significant deterioration in the patient’s condition required my highest level of medical decision making and my highest level of preparedness to intervene emergently.   I  provided minutes 35 of critical care, not including other billable services/procedures.    The patient was reevaluated/re-examined multiple times during the visit. Critical care time includes management at bedside, discussion with other providers and consultants, family counseling and answering questions, and documentation. Care involves decision making of high complexity to assess, manipulate, and support vital organ system failure and/or to prevent further life threatening deterioration of the patient's condition. Failure to initiate these interventions on an urgent basis would likely result in sudden, clinically significant or life threatening deterioration in the patient's condition of just heart failure, hypoxemia     Reymundo Avelar MD  02/18/24 1852       Reymundo Avelar MD  02/18/24 2125       Reymundo Avelar MD  02/25/24 1031

## 2024-02-19 ENCOUNTER — APPOINTMENT (OUTPATIENT)
Dept: RADIOLOGY | Facility: HOSPITAL | Age: 69
DRG: 291 | End: 2024-02-19
Payer: MEDICARE

## 2024-02-19 ENCOUNTER — HOSPITAL ENCOUNTER (INPATIENT)
Facility: HOSPITAL | Age: 69
LOS: 15 days | Discharge: NURSING, REHAB OR LONG-TERM CARE | DRG: 291 | End: 2024-03-05
Attending: FAMILY MEDICINE | Admitting: INTERNAL MEDICINE
Payer: MEDICARE

## 2024-02-19 VITALS
HEIGHT: 66 IN | OXYGEN SATURATION: 94 % | BODY MASS INDEX: 36.02 KG/M2 | TEMPERATURE: 97.8 F | HEART RATE: 61 BPM | DIASTOLIC BLOOD PRESSURE: 70 MMHG | RESPIRATION RATE: 22 BRPM | SYSTOLIC BLOOD PRESSURE: 127 MMHG | WEIGHT: 224.1 LBS

## 2024-02-19 VITALS
HEART RATE: 88 BPM | DIASTOLIC BLOOD PRESSURE: 84 MMHG | TEMPERATURE: 98 F | RESPIRATION RATE: 18 BRPM | SYSTOLIC BLOOD PRESSURE: 126 MMHG

## 2024-02-19 DIAGNOSIS — N18.32 STAGE 3B CHRONIC KIDNEY DISEASE (CKD) (MULTI): ICD-10-CM

## 2024-02-19 DIAGNOSIS — I50.9 HEART FAILURE (MULTI): Primary | ICD-10-CM

## 2024-02-19 DIAGNOSIS — J44.9 CHRONIC OBSTRUCTIVE PULMONARY DISEASE, UNSPECIFIED COPD TYPE (MULTI): ICD-10-CM

## 2024-02-19 DIAGNOSIS — I73.9 PERIPHERAL VASCULAR DISEASE (CMS-HCC): ICD-10-CM

## 2024-02-19 LAB
ALBUMIN SERPL BCP-MCNC: 2.5 G/DL (ref 3.4–5)
ALP SERPL-CCNC: 71 U/L (ref 33–136)
ALT SERPL W P-5'-P-CCNC: 13 U/L (ref 7–45)
ANION GAP BLDA CALCULATED.4IONS-SCNC: 6 MMO/L (ref 10–25)
ANION GAP BLDA CALCULATED.4IONS-SCNC: 8 MMO/L (ref 10–25)
ANION GAP BLDA CALCULATED.4IONS-SCNC: 8 MMO/L (ref 10–25)
ANION GAP BLDV CALCULATED.4IONS-SCNC: 7 MMOL/L (ref 10–25)
ANION GAP SERPL CALC-SCNC: 13 MMOL/L (ref 10–20)
APPEARANCE UR: ABNORMAL
AST SERPL W P-5'-P-CCNC: 20 U/L (ref 9–39)
BACTERIA #/AREA URNS AUTO: ABNORMAL /HPF
BASE EXCESS BLDA CALC-SCNC: 4.2 MMOL/L (ref -2–3)
BASE EXCESS BLDA CALC-SCNC: 4.8 MMOL/L (ref -2–3)
BASE EXCESS BLDA CALC-SCNC: 5.3 MMOL/L (ref -2–3)
BASE EXCESS BLDV CALC-SCNC: 5.7 MMOL/L (ref -2–3)
BASOPHILS # BLD AUTO: 0.03 X10*3/UL (ref 0–0.1)
BASOPHILS NFR BLD AUTO: 0.5 %
BILIRUB SERPL-MCNC: 0.3 MG/DL (ref 0–1.2)
BILIRUB UR STRIP.AUTO-MCNC: NEGATIVE MG/DL
BODY TEMPERATURE: ABNORMAL
BUN SERPL-MCNC: 61 MG/DL (ref 6–23)
CA-I BLDA-SCNC: 1.16 MMOL/L (ref 1.1–1.33)
CA-I BLDA-SCNC: 1.17 MMOL/L (ref 1.1–1.33)
CA-I BLDA-SCNC: 1.19 MMOL/L (ref 1.1–1.33)
CA-I BLDV-SCNC: 1.17 MMOL/L (ref 1.1–1.33)
CALCIUM SERPL-MCNC: 7.9 MG/DL (ref 8.6–10.3)
CHLORIDE BLDA-SCNC: 101 MMOL/L (ref 98–107)
CHLORIDE BLDA-SCNC: 102 MMOL/L (ref 98–107)
CHLORIDE BLDA-SCNC: 102 MMOL/L (ref 98–107)
CHLORIDE BLDV-SCNC: 102 MMOL/L (ref 98–107)
CHLORIDE SERPL-SCNC: 100 MMOL/L (ref 98–107)
CO2 SERPL-SCNC: 31 MMOL/L (ref 21–32)
COLOR UR: YELLOW
CREAT SERPL-MCNC: 3.02 MG/DL (ref 0.5–1.05)
EGFRCR SERPLBLD CKD-EPI 2021: 16 ML/MIN/1.73M*2
EOSINOPHIL # BLD AUTO: 0.02 X10*3/UL (ref 0–0.7)
EOSINOPHIL NFR BLD AUTO: 0.4 %
ERYTHROCYTE [DISTWIDTH] IN BLOOD BY AUTOMATED COUNT: 13.6 % (ref 11.5–14.5)
FLUAV RNA RESP QL NAA+PROBE: NOT DETECTED
FLUBV RNA RESP QL NAA+PROBE: DETECTED
FUNGUS SPEC CULT: NORMAL
FUNGUS SPEC FUNGUS STN: NORMAL
GLUCOSE BLD MANUAL STRIP-MCNC: 88 MG/DL (ref 74–99)
GLUCOSE BLD MANUAL STRIP-MCNC: 89 MG/DL (ref 74–99)
GLUCOSE BLD MANUAL STRIP-MCNC: 92 MG/DL (ref 74–99)
GLUCOSE BLDA-MCNC: 81 MG/DL (ref 74–99)
GLUCOSE BLDA-MCNC: 86 MG/DL (ref 74–99)
GLUCOSE BLDA-MCNC: 86 MG/DL (ref 74–99)
GLUCOSE BLDV-MCNC: 92 MG/DL (ref 74–99)
GLUCOSE SERPL-MCNC: 88 MG/DL (ref 74–99)
GLUCOSE UR STRIP.AUTO-MCNC: ABNORMAL MG/DL
HCO3 BLDA-SCNC: 31.2 MMOL/L (ref 22–26)
HCO3 BLDA-SCNC: 32.2 MMOL/L (ref 22–26)
HCO3 BLDA-SCNC: 32.7 MMOL/L (ref 22–26)
HCO3 BLDV-SCNC: 33.8 MMOL/L (ref 22–26)
HCT VFR BLD AUTO: 24.6 % (ref 36–46)
HCT VFR BLD EST: 21 % (ref 36–46)
HCT VFR BLD EST: 21 % (ref 36–46)
HCT VFR BLD EST: 22 % (ref 36–46)
HCT VFR BLD EST: 24 % (ref 36–46)
HGB BLD-MCNC: 7 G/DL (ref 12–16)
HGB BLDA-MCNC: 7.1 G/DL (ref 12–16)
HGB BLDA-MCNC: 7.4 G/DL (ref 12–16)
HGB BLDA-MCNC: 7.9 G/DL (ref 12–16)
HGB BLDV-MCNC: 7.1 G/DL (ref 12–16)
IMM GRANULOCYTES # BLD AUTO: 0.06 X10*3/UL (ref 0–0.7)
IMM GRANULOCYTES NFR BLD AUTO: 1.1 % (ref 0–0.9)
INHALED O2 CONCENTRATION: 60 %
INHALED O2 CONCENTRATION: 75 %
KETONES UR STRIP.AUTO-MCNC: ABNORMAL MG/DL
LACTATE BLDA-SCNC: 0.4 MMOL/L (ref 0.4–2)
LACTATE BLDA-SCNC: 0.4 MMOL/L (ref 0.4–2)
LACTATE BLDA-SCNC: 0.5 MMOL/L (ref 0.4–2)
LACTATE BLDV-SCNC: 0.5 MMOL/L (ref 0.4–2)
LEUKOCYTE ESTERASE UR QL STRIP.AUTO: NEGATIVE
LYMPHOCYTES # BLD AUTO: 0.48 X10*3/UL (ref 1.2–4.8)
LYMPHOCYTES NFR BLD AUTO: 8.6 %
MAGNESIUM SERPL-MCNC: 1.85 MG/DL (ref 1.6–2.4)
MCH RBC QN AUTO: 29.7 PG (ref 26–34)
MCHC RBC AUTO-ENTMCNC: 28.5 G/DL (ref 32–36)
MCV RBC AUTO: 104 FL (ref 80–100)
MONOCYTES # BLD AUTO: 0.65 X10*3/UL (ref 0.1–1)
MONOCYTES NFR BLD AUTO: 11.6 %
NEUTROPHILS # BLD AUTO: 4.34 X10*3/UL (ref 1.2–7.7)
NEUTROPHILS NFR BLD AUTO: 77.8 %
NITRITE UR QL STRIP.AUTO: NEGATIVE
NRBC BLD-RTO: 0 /100 WBCS (ref 0–0)
OXYHGB MFR BLDA: 94.2 % (ref 94–98)
OXYHGB MFR BLDA: 94.3 % (ref 94–98)
OXYHGB MFR BLDA: 97.1 % (ref 94–98)
OXYHGB MFR BLDV: 55.4 % (ref 45–75)
PCO2 BLDA: 62 MM HG (ref 38–42)
PCO2 BLDA: 67 MM HG (ref 38–42)
PCO2 BLDA: 68 MM HG (ref 38–42)
PCO2 BLDV: 77 MM HG (ref 41–51)
PH BLDA: 7.29 PH (ref 7.38–7.42)
PH BLDA: 7.29 PH (ref 7.38–7.42)
PH BLDA: 7.31 PH (ref 7.38–7.42)
PH BLDV: 7.25 PH (ref 7.33–7.43)
PH UR STRIP.AUTO: 5 [PH]
PLATELET # BLD AUTO: 213 X10*3/UL (ref 150–450)
PO2 BLDA: 69 MM HG (ref 85–95)
PO2 BLDA: 74 MM HG (ref 85–95)
PO2 BLDA: 95 MM HG (ref 85–95)
PO2 BLDV: 34 MM HG (ref 35–45)
POTASSIUM BLDA-SCNC: 5.3 MMOL/L (ref 3.5–5.3)
POTASSIUM BLDA-SCNC: 5.4 MMOL/L (ref 3.5–5.3)
POTASSIUM BLDA-SCNC: 5.4 MMOL/L (ref 3.5–5.3)
POTASSIUM BLDV-SCNC: 5.4 MMOL/L (ref 3.5–5.3)
POTASSIUM SERPL-SCNC: 5.1 MMOL/L (ref 3.5–5.3)
PROT SERPL-MCNC: 5.5 G/DL (ref 6.4–8.2)
PROT UR STRIP.AUTO-MCNC: ABNORMAL MG/DL
RBC # BLD AUTO: 2.36 X10*6/UL (ref 4–5.2)
RBC # UR STRIP.AUTO: NEGATIVE /UL
RBC #/AREA URNS AUTO: ABNORMAL /HPF
SAO2 % BLDA: 96 % (ref 94–100)
SAO2 % BLDA: 96 % (ref 94–100)
SAO2 % BLDA: 99 % (ref 94–100)
SAO2 % BLDV: 56 % (ref 45–75)
SARS-COV-2 RNA RESP QL NAA+PROBE: NOT DETECTED
SODIUM BLDA-SCNC: 135 MMOL/L (ref 136–145)
SODIUM BLDA-SCNC: 135 MMOL/L (ref 136–145)
SODIUM BLDA-SCNC: 137 MMOL/L (ref 136–145)
SODIUM BLDV-SCNC: 137 MMOL/L (ref 136–145)
SODIUM SERPL-SCNC: 139 MMOL/L (ref 136–145)
SP GR UR STRIP.AUTO: 1.02
SQUAMOUS #/AREA URNS AUTO: ABNORMAL /HPF
T4 FREE SERPL-MCNC: 1.32 NG/DL (ref 0.61–1.12)
TSH SERPL-ACNC: 5.22 MIU/L (ref 0.44–3.98)
UROBILINOGEN UR STRIP.AUTO-MCNC: <2 MG/DL
WBC # BLD AUTO: 5.6 X10*3/UL (ref 4.4–11.3)
WBC #/AREA URNS AUTO: ABNORMAL /HPF
YEAST BUDDING #/AREA UR COMP ASSIST: PRESENT /HPF

## 2024-02-19 PROCEDURE — 1100000001 HC PRIVATE ROOM DAILY

## 2024-02-19 PROCEDURE — 2500000002 HC RX 250 W HCPCS SELF ADMINISTERED DRUGS (ALT 637 FOR MEDICARE OP, ALT 636 FOR OP/ED): Performed by: FAMILY MEDICINE

## 2024-02-19 PROCEDURE — 96372 THER/PROPH/DIAG INJ SC/IM: CPT

## 2024-02-19 PROCEDURE — 83735 ASSAY OF MAGNESIUM: CPT

## 2024-02-19 PROCEDURE — 82947 ASSAY GLUCOSE BLOOD QUANT: CPT

## 2024-02-19 PROCEDURE — 2500000004 HC RX 250 GENERAL PHARMACY W/ HCPCS (ALT 636 FOR OP/ED)

## 2024-02-19 PROCEDURE — 94660 CPAP INITIATION&MGMT: CPT

## 2024-02-19 PROCEDURE — 85025 COMPLETE CBC W/AUTO DIFF WBC: CPT

## 2024-02-19 PROCEDURE — 2500000002 HC RX 250 W HCPCS SELF ADMINISTERED DRUGS (ALT 637 FOR MEDICARE OP, ALT 636 FOR OP/ED)

## 2024-02-19 PROCEDURE — 78830 RP LOCLZJ TUM SPECT W/CT 1: CPT

## 2024-02-19 PROCEDURE — 81001 URINALYSIS AUTO W/SCOPE: CPT

## 2024-02-19 PROCEDURE — 3430000001 HC RX 343 DIAGNOSTIC RADIOPHARMACEUTICALS: Performed by: FAMILY MEDICINE

## 2024-02-19 PROCEDURE — 36415 COLL VENOUS BLD VENIPUNCTURE: CPT

## 2024-02-19 PROCEDURE — 94799 UNLISTED PULMONARY SVC/PX: CPT

## 2024-02-19 PROCEDURE — 84132 ASSAY OF SERUM POTASSIUM: CPT

## 2024-02-19 PROCEDURE — 94664 DEMO&/EVAL PT USE INHALER: CPT

## 2024-02-19 PROCEDURE — 2500000001 HC RX 250 WO HCPCS SELF ADMINISTERED DRUGS (ALT 637 FOR MEDICARE OP)

## 2024-02-19 PROCEDURE — 2500000004 HC RX 250 GENERAL PHARMACY W/ HCPCS (ALT 636 FOR OP/ED): Performed by: EMERGENCY MEDICINE

## 2024-02-19 PROCEDURE — 99223 1ST HOSP IP/OBS HIGH 75: CPT

## 2024-02-19 PROCEDURE — 2500000004 HC RX 250 GENERAL PHARMACY W/ HCPCS (ALT 636 FOR OP/ED): Performed by: FAMILY MEDICINE

## 2024-02-19 PROCEDURE — 84145 PROCALCITONIN (PCT): CPT | Mod: GEALAB

## 2024-02-19 PROCEDURE — 84132 ASSAY OF SERUM POTASSIUM: CPT | Performed by: FAMILY MEDICINE

## 2024-02-19 PROCEDURE — 5A09357 ASSISTANCE WITH RESPIRATORY VENTILATION, LESS THAN 24 CONSECUTIVE HOURS, CONTINUOUS POSITIVE AIRWAY PRESSURE: ICD-10-PCS | Performed by: STUDENT IN AN ORGANIZED HEALTH CARE EDUCATION/TRAINING PROGRAM

## 2024-02-19 PROCEDURE — 84439 ASSAY OF FREE THYROXINE: CPT

## 2024-02-19 PROCEDURE — 78830 RP LOCLZJ TUM SPECT W/CT 1: CPT | Performed by: NUCLEAR MEDICINE

## 2024-02-19 PROCEDURE — 94640 AIRWAY INHALATION TREATMENT: CPT

## 2024-02-19 PROCEDURE — 2500000005 HC RX 250 GENERAL PHARMACY W/O HCPCS: Performed by: FAMILY MEDICINE

## 2024-02-19 PROCEDURE — 84443 ASSAY THYROID STIM HORMONE: CPT

## 2024-02-19 PROCEDURE — 36600 WITHDRAWAL OF ARTERIAL BLOOD: CPT

## 2024-02-19 PROCEDURE — A9540 TC99M MAA: HCPCS | Performed by: FAMILY MEDICINE

## 2024-02-19 PROCEDURE — 87636 SARSCOV2 & INF A&B AMP PRB: CPT | Performed by: INTERNAL MEDICINE

## 2024-02-19 RX ORDER — ONDANSETRON 4 MG/1
4 TABLET, FILM COATED ORAL EVERY 8 HOURS PRN
Status: DISCONTINUED | OUTPATIENT
Start: 2024-02-19 | End: 2024-03-05 | Stop reason: HOSPADM

## 2024-02-19 RX ORDER — PANTOPRAZOLE SODIUM 40 MG/1
40 TABLET, DELAYED RELEASE ORAL 2 TIMES DAILY
Status: DISCONTINUED | OUTPATIENT
Start: 2024-02-19 | End: 2024-03-05 | Stop reason: HOSPADM

## 2024-02-19 RX ORDER — ONDANSETRON HYDROCHLORIDE 2 MG/ML
4 INJECTION, SOLUTION INTRAVENOUS EVERY 8 HOURS PRN
Status: DISCONTINUED | OUTPATIENT
Start: 2024-02-19 | End: 2024-03-05 | Stop reason: HOSPADM

## 2024-02-19 RX ORDER — CLOPIDOGREL BISULFATE 75 MG/1
75 TABLET ORAL DAILY
Status: DISCONTINUED | OUTPATIENT
Start: 2024-02-19 | End: 2024-03-05 | Stop reason: HOSPADM

## 2024-02-19 RX ORDER — ALBUTEROL SULFATE 90 UG/1
2 AEROSOL, METERED RESPIRATORY (INHALATION) EVERY 4 HOURS PRN
Status: DISCONTINUED | OUTPATIENT
Start: 2024-02-19 | End: 2024-03-05 | Stop reason: HOSPADM

## 2024-02-19 RX ORDER — ASPIRIN 81 MG/1
81 TABLET ORAL DAILY
Status: DISCONTINUED | OUTPATIENT
Start: 2024-02-19 | End: 2024-03-05 | Stop reason: HOSPADM

## 2024-02-19 RX ORDER — INSULIN LISPRO 100 [IU]/ML
0-15 INJECTION, SOLUTION INTRAVENOUS; SUBCUTANEOUS
Status: DISCONTINUED | OUTPATIENT
Start: 2024-02-19 | End: 2024-03-05 | Stop reason: HOSPADM

## 2024-02-19 RX ORDER — ACETAMINOPHEN 650 MG/1
650 SUPPOSITORY RECTAL EVERY 4 HOURS PRN
Status: DISCONTINUED | OUTPATIENT
Start: 2024-02-19 | End: 2024-03-05 | Stop reason: HOSPADM

## 2024-02-19 RX ORDER — IPRATROPIUM BROMIDE AND ALBUTEROL SULFATE 2.5; .5 MG/3ML; MG/3ML
3 SOLUTION RESPIRATORY (INHALATION) EVERY 6 HOURS PRN
Status: DISCONTINUED | OUTPATIENT
Start: 2024-02-19 | End: 2024-02-19

## 2024-02-19 RX ORDER — HEPARIN SODIUM 5000 [USP'U]/ML
5000 INJECTION, SOLUTION INTRAVENOUS; SUBCUTANEOUS EVERY 8 HOURS
Status: DISCONTINUED | OUTPATIENT
Start: 2024-02-19 | End: 2024-03-05 | Stop reason: HOSPADM

## 2024-02-19 RX ORDER — AMIODARONE HYDROCHLORIDE 200 MG/1
200 TABLET ORAL
Status: DISCONTINUED | OUTPATIENT
Start: 2024-02-19 | End: 2024-03-05 | Stop reason: HOSPADM

## 2024-02-19 RX ORDER — FERROUS GLUCONATE 325 MG
324 TABLET ORAL
Status: DISCONTINUED | OUTPATIENT
Start: 2024-02-19 | End: 2024-03-05 | Stop reason: HOSPADM

## 2024-02-19 RX ORDER — OSELTAMIVIR PHOSPHATE 30 MG/1
30 CAPSULE ORAL DAILY
Status: DISCONTINUED | OUTPATIENT
Start: 2024-02-19 | End: 2024-02-23

## 2024-02-19 RX ORDER — DEXTROSE 50 % IN WATER (D50W) INTRAVENOUS SYRINGE
25
Status: DISCONTINUED | OUTPATIENT
Start: 2024-02-19 | End: 2024-03-05 | Stop reason: HOSPADM

## 2024-02-19 RX ORDER — ACETAMINOPHEN 500 MG
5 TABLET ORAL DAILY
Status: DISCONTINUED | OUTPATIENT
Start: 2024-02-19 | End: 2024-03-05 | Stop reason: HOSPADM

## 2024-02-19 RX ORDER — VANCOMYCIN HYDROCHLORIDE 750 MG/150ML
750 INJECTION, SOLUTION INTRAVENOUS EVERY 24 HOURS
Status: DISCONTINUED | OUTPATIENT
Start: 2024-02-19 | End: 2024-02-21

## 2024-02-19 RX ORDER — POTASSIUM CHLORIDE 20 MEQ/1
40 TABLET, EXTENDED RELEASE ORAL DAILY
Status: DISCONTINUED | OUTPATIENT
Start: 2024-02-19 | End: 2024-02-25

## 2024-02-19 RX ORDER — FUROSEMIDE 40 MG/1
80 TABLET ORAL 2 TIMES DAILY
Status: DISCONTINUED | OUTPATIENT
Start: 2024-02-19 | End: 2024-02-27

## 2024-02-19 RX ORDER — ACETAMINOPHEN 325 MG/1
650 TABLET ORAL EVERY 4 HOURS PRN
Status: DISCONTINUED | OUTPATIENT
Start: 2024-02-19 | End: 2024-03-05 | Stop reason: HOSPADM

## 2024-02-19 RX ORDER — LEVOTHYROXINE SODIUM 100 UG/1
200 TABLET ORAL
Status: DISCONTINUED | OUTPATIENT
Start: 2024-02-19 | End: 2024-03-05 | Stop reason: HOSPADM

## 2024-02-19 RX ORDER — LORAZEPAM 2 MG/ML
INJECTION INTRAMUSCULAR
Status: COMPLETED
Start: 2024-02-19 | End: 2024-02-19

## 2024-02-19 RX ORDER — POLYETHYLENE GLYCOL 3350 17 G/17G
17 POWDER, FOR SOLUTION ORAL DAILY
Status: DISCONTINUED | OUTPATIENT
Start: 2024-02-19 | End: 2024-03-05 | Stop reason: HOSPADM

## 2024-02-19 RX ORDER — VENLAFAXINE HYDROCHLORIDE 75 MG/1
75 CAPSULE, EXTENDED RELEASE ORAL DAILY
Status: DISCONTINUED | OUTPATIENT
Start: 2024-02-19 | End: 2024-03-05 | Stop reason: HOSPADM

## 2024-02-19 RX ORDER — INSULIN GLARGINE 100 [IU]/ML
14 INJECTION, SOLUTION SUBCUTANEOUS NIGHTLY
Status: DISCONTINUED | OUTPATIENT
Start: 2024-02-19 | End: 2024-03-03

## 2024-02-19 RX ORDER — IPRATROPIUM BROMIDE AND ALBUTEROL SULFATE 2.5; .5 MG/3ML; MG/3ML
3 SOLUTION RESPIRATORY (INHALATION) EVERY 2 HOUR PRN
Status: DISCONTINUED | OUTPATIENT
Start: 2024-02-19 | End: 2024-03-05 | Stop reason: HOSPADM

## 2024-02-19 RX ORDER — ATORVASTATIN CALCIUM 40 MG/1
40 TABLET, FILM COATED ORAL DAILY
Status: DISCONTINUED | OUTPATIENT
Start: 2024-02-19 | End: 2024-03-05 | Stop reason: HOSPADM

## 2024-02-19 RX ORDER — AMLODIPINE BESYLATE 5 MG/1
5 TABLET ORAL DAILY
Status: DISCONTINUED | OUTPATIENT
Start: 2024-02-19 | End: 2024-03-01

## 2024-02-19 RX ORDER — POTASSIUM CHLORIDE 20 MEQ/1
20 TABLET, EXTENDED RELEASE ORAL DAILY
Status: DISCONTINUED | OUTPATIENT
Start: 2024-02-19 | End: 2024-02-29

## 2024-02-19 RX ORDER — LEVOTHYROXINE SODIUM 50 UG/1
50 TABLET ORAL
Status: DISCONTINUED | OUTPATIENT
Start: 2024-02-19 | End: 2024-02-19

## 2024-02-19 RX ORDER — DEXTROSE MONOHYDRATE 100 MG/ML
0.3 INJECTION, SOLUTION INTRAVENOUS ONCE AS NEEDED
Status: DISCONTINUED | OUTPATIENT
Start: 2024-02-19 | End: 2024-03-05 | Stop reason: HOSPADM

## 2024-02-19 RX ORDER — DIPHENHYDRAMINE HYDROCHLORIDE 50 MG/ML
25 INJECTION INTRAMUSCULAR; INTRAVENOUS EVERY 6 HOURS PRN
Status: DISCONTINUED | OUTPATIENT
Start: 2024-02-19 | End: 2024-03-05 | Stop reason: HOSPADM

## 2024-02-19 RX ORDER — ACETAMINOPHEN 160 MG/5ML
650 SOLUTION ORAL EVERY 4 HOURS PRN
Status: DISCONTINUED | OUTPATIENT
Start: 2024-02-19 | End: 2024-03-05 | Stop reason: HOSPADM

## 2024-02-19 RX ORDER — LORAZEPAM 2 MG/ML
0.5 INJECTION INTRAMUSCULAR ONCE
Status: COMPLETED | OUTPATIENT
Start: 2024-02-19 | End: 2024-02-19

## 2024-02-19 RX ORDER — METOPROLOL SUCCINATE 50 MG/1
50 TABLET, EXTENDED RELEASE ORAL 2 TIMES DAILY
Status: DISCONTINUED | OUTPATIENT
Start: 2024-02-19 | End: 2024-03-01

## 2024-02-19 RX ORDER — IPRATROPIUM BROMIDE AND ALBUTEROL SULFATE 2.5; .5 MG/3ML; MG/3ML
3 SOLUTION RESPIRATORY (INHALATION)
Status: DISCONTINUED | OUTPATIENT
Start: 2024-02-19 | End: 2024-03-05 | Stop reason: HOSPADM

## 2024-02-19 RX ADMIN — ENOXAPARIN SODIUM 100 MG: 100 INJECTION SUBCUTANEOUS at 00:14

## 2024-02-19 RX ADMIN — ATORVASTATIN CALCIUM 40 MG: 40 TABLET, FILM COATED ORAL at 10:12

## 2024-02-19 RX ADMIN — IPRATROPIUM BROMIDE AND ALBUTEROL SULFATE 3 ML: 2.5; .5 SOLUTION RESPIRATORY (INHALATION) at 19:33

## 2024-02-19 RX ADMIN — ASPIRIN 81 MG: 81 TABLET, COATED ORAL at 10:12

## 2024-02-19 RX ADMIN — HEPARIN SODIUM 5000 UNITS: 5000 INJECTION INTRAVENOUS; SUBCUTANEOUS at 10:26

## 2024-02-19 RX ADMIN — Medication 60 PERCENT: at 08:20

## 2024-02-19 RX ADMIN — METOPROLOL SUCCINATE 50 MG: 50 TABLET, EXTENDED RELEASE ORAL at 20:51

## 2024-02-19 RX ADMIN — IPRATROPIUM BROMIDE AND ALBUTEROL SULFATE 3 ML: 2.5; .5 SOLUTION RESPIRATORY (INHALATION) at 14:22

## 2024-02-19 RX ADMIN — VANCOMYCIN HYDROCHLORIDE 750 MG: 750 INJECTION, SOLUTION INTRAVENOUS at 12:28

## 2024-02-19 RX ADMIN — KIT FOR THE PREPARATION OF TECHNETIUM TC 99M ALBUMIN AGGREGATED 4.1 MILLICURIE: 2.5 INJECTION, POWDER, FOR SOLUTION INTRAVENOUS at 09:33

## 2024-02-19 RX ADMIN — Medication 5 MG: at 20:51

## 2024-02-19 RX ADMIN — LORAZEPAM 0.5 MG: 2 INJECTION INTRAMUSCULAR at 00:29

## 2024-02-19 RX ADMIN — LEVOTHYROXINE SODIUM 200 MCG: 100 TABLET ORAL at 10:26

## 2024-02-19 RX ADMIN — OSELTAMAVIR PHOSPHATE 30 MG: 30 CAPSULE ORAL at 10:27

## 2024-02-19 RX ADMIN — FERROUS GLUCONATE 324 MG: 324 TABLET ORAL at 10:27

## 2024-02-19 RX ADMIN — LORAZEPAM 0.5 MG: 2 INJECTION INTRAMUSCULAR; INTRAVENOUS at 00:29

## 2024-02-19 RX ADMIN — PANTOPRAZOLE SODIUM 40 MG: 40 TABLET, DELAYED RELEASE ORAL at 20:51

## 2024-02-19 RX ADMIN — AMLODIPINE BESYLATE 5 MG: 5 TABLET ORAL at 10:13

## 2024-02-19 RX ADMIN — PANTOPRAZOLE SODIUM 40 MG: 40 TABLET, DELAYED RELEASE ORAL at 10:12

## 2024-02-19 RX ADMIN — FUROSEMIDE 10 MG/HR: 10 INJECTION, SOLUTION INTRAMUSCULAR; INTRAVENOUS at 12:29

## 2024-02-19 RX ADMIN — CLOPIDOGREL 75 MG: 75 TABLET ORAL at 10:12

## 2024-02-19 RX ADMIN — HEPARIN SODIUM 5000 UNITS: 5000 INJECTION INTRAVENOUS; SUBCUTANEOUS at 17:02

## 2024-02-19 RX ADMIN — Medication 10 L/MIN: at 17:30

## 2024-02-19 RX ADMIN — AMIODARONE HYDROCHLORIDE 200 MG: 200 TABLET ORAL at 10:12

## 2024-02-19 RX ADMIN — METOPROLOL SUCCINATE 50 MG: 50 TABLET, EXTENDED RELEASE ORAL at 10:14

## 2024-02-19 RX ADMIN — VENLAFAXINE HYDROCHLORIDE 75 MG: 75 CAPSULE, EXTENDED RELEASE ORAL at 10:27

## 2024-02-19 SDOH — SOCIAL STABILITY: SOCIAL INSECURITY: ARE THERE ANY APPARENT SIGNS OF INJURIES/BEHAVIORS THAT COULD BE RELATED TO ABUSE/NEGLECT?: NO

## 2024-02-19 SDOH — SOCIAL STABILITY: SOCIAL INSECURITY: ARE YOU OR HAVE YOU BEEN THREATENED OR ABUSED PHYSICALLY, EMOTIONALLY, OR SEXUALLY BY ANYONE?: NO

## 2024-02-19 SDOH — SOCIAL STABILITY: SOCIAL INSECURITY: HAS ANYONE EVER THREATENED TO HURT YOUR FAMILY OR YOUR PETS?: NO

## 2024-02-19 SDOH — SOCIAL STABILITY: SOCIAL INSECURITY: WERE YOU ABLE TO COMPLETE ALL THE BEHAVIORAL HEALTH SCREENINGS?: YES

## 2024-02-19 SDOH — SOCIAL STABILITY: SOCIAL INSECURITY: ABUSE: ADULT

## 2024-02-19 SDOH — SOCIAL STABILITY: SOCIAL INSECURITY: DO YOU FEEL ANYONE HAS EXPLOITED OR TAKEN ADVANTAGE OF YOU FINANCIALLY OR OF YOUR PERSONAL PROPERTY?: NO

## 2024-02-19 SDOH — SOCIAL STABILITY: SOCIAL INSECURITY: DO YOU FEEL UNSAFE GOING BACK TO THE PLACE WHERE YOU ARE LIVING?: NO

## 2024-02-19 SDOH — SOCIAL STABILITY: SOCIAL INSECURITY: HAVE YOU HAD THOUGHTS OF HARMING ANYONE ELSE?: NO

## 2024-02-19 SDOH — SOCIAL STABILITY: SOCIAL INSECURITY: DOES ANYONE TRY TO KEEP YOU FROM HAVING/CONTACTING OTHER FRIENDS OR DOING THINGS OUTSIDE YOUR HOME?: NO

## 2024-02-19 ASSESSMENT — ACTIVITIES OF DAILY LIVING (ADL)
PATIENT'S MEMORY ADEQUATE TO SAFELY COMPLETE DAILY ACTIVITIES?: NO
LACK_OF_TRANSPORTATION: PATIENT DECLINED
TOILETING: NEEDS ASSISTANCE
HEARING - RIGHT EAR: FUNCTIONAL
HEARING - LEFT EAR: FUNCTIONAL
GROOMING: NEEDS ASSISTANCE
WALKS IN HOME: NEEDS ASSISTANCE
ADEQUATE_TO_COMPLETE_ADL: YES
LACK_OF_TRANSPORTATION: NO
FEEDING YOURSELF: NEEDS ASSISTANCE
JUDGMENT_ADEQUATE_SAFELY_COMPLETE_DAILY_ACTIVITIES: YES
DRESSING YOURSELF: NEEDS ASSISTANCE
BATHING: NEEDS ASSISTANCE

## 2024-02-19 ASSESSMENT — ENCOUNTER SYMPTOMS
FEVER: 0
EYES NEGATIVE: 1
GASTROINTESTINAL NEGATIVE: 1
FACIAL ASYMMETRY: 0
CHILLS: 0
FEVER: 0
MUSCULOSKELETAL NEGATIVE: 1
CHILLS: 1
WEAKNESS: 1
COUGH: 1
PALPITATIONS: 0
DIZZINESS: 0
WHEEZING: 1
SHORTNESS OF BREATH: 1
HEADACHES: 0
LIGHT-HEADEDNESS: 0

## 2024-02-19 ASSESSMENT — LIFESTYLE VARIABLES
HOW OFTEN DO YOU HAVE 6 OR MORE DRINKS ON ONE OCCASION: NEVER
AUDIT-C TOTAL SCORE: 0
AUDIT-C TOTAL SCORE: 0
SKIP TO QUESTIONS 9-10: 1
HOW OFTEN DO YOU HAVE A DRINK CONTAINING ALCOHOL: NEVER
HOW MANY STANDARD DRINKS CONTAINING ALCOHOL DO YOU HAVE ON A TYPICAL DAY: PATIENT DOES NOT DRINK

## 2024-02-19 ASSESSMENT — COGNITIVE AND FUNCTIONAL STATUS - GENERAL
WALKING IN HOSPITAL ROOM: A LOT
STANDING UP FROM CHAIR USING ARMS: A LOT
CLIMB 3 TO 5 STEPS WITH RAILING: A LOT
MOVING FROM LYING ON BACK TO SITTING ON SIDE OF FLAT BED WITH BEDRAILS: A LOT
DRESSING REGULAR UPPER BODY CLOTHING: A LOT
PATIENT BASELINE BEDBOUND: NO
MOVING TO AND FROM BED TO CHAIR: A LOT
TURNING FROM BACK TO SIDE WHILE IN FLAT BAD: A LOT
TOILETING: A LOT
EATING MEALS: A LITTLE
PERSONAL GROOMING: A LOT
HELP NEEDED FOR BATHING: A LOT
DAILY ACTIVITIY SCORE: 13
MOBILITY SCORE: 12
DRESSING REGULAR LOWER BODY CLOTHING: A LOT

## 2024-02-19 ASSESSMENT — PATIENT HEALTH QUESTIONNAIRE - PHQ9
1. LITTLE INTEREST OR PLEASURE IN DOING THINGS: NOT AT ALL
2. FEELING DOWN, DEPRESSED OR HOPELESS: NOT AT ALL
SUM OF ALL RESPONSES TO PHQ9 QUESTIONS 1 & 2: 0

## 2024-02-19 ASSESSMENT — PAIN - FUNCTIONAL ASSESSMENT
PAIN_FUNCTIONAL_ASSESSMENT: 0-10

## 2024-02-19 ASSESSMENT — COLUMBIA-SUICIDE SEVERITY RATING SCALE - C-SSRS
2. HAVE YOU ACTUALLY HAD ANY THOUGHTS OF KILLING YOURSELF?: NO
1. IN THE PAST MONTH, HAVE YOU WISHED YOU WERE DEAD OR WISHED YOU COULD GO TO SLEEP AND NOT WAKE UP?: NO
6. HAVE YOU EVER DONE ANYTHING, STARTED TO DO ANYTHING, OR PREPARED TO DO ANYTHING TO END YOUR LIFE?: NO

## 2024-02-19 ASSESSMENT — PAIN SCALES - GENERAL
PAINLEVEL_OUTOF10: 0 - NO PAIN

## 2024-02-19 NOTE — CONSULTS
Reason For Consult  Acute on chronic hypoxic hypercapnic respiratory failure    History Of Present Illness  Daphne Morris is a 68 y.o. female with a PMH of HTN, HLD, A-fib s/p watchman, T2DM, COPD on 2L at baseline, HFpEF w/ EF 60-65% on 2/5/24, CVA, CKD stage 4, hypothyroidism, GERD, and depression presenting with worsening SOB. History predominantly obtained from chart review, patient has BiPAP on this AM. She was recently admitted here from 2/2-2/15 for acute hypoxic respiratory failure 2/2 CHF and COPD exacerbation and a right sided pleural effusion. Patient received steroid treatment, nebulizers, and IV diuresis. Patient underwent a thoracentesis draining 2.3L of fluid, found to be transudative in nature.     Patient was discharged on the 15th. Yesterday, the patient experienced worsening SOB, productive cough of yellow-green sputum, and wheezing. Was found to be 82% upon ED arrival. [place] at [address], BNP 1444. CXR showed right sided pleural effusion as well as mild new onset left pleural effusion. ABG done was concerning for respiratory acidosis, so was started on BiPAP Patient transferred from [place] to [facility] for further management of symptoms and the need for V/Q scan.     Patient seen and examined at bedside. She was on BiPAP during interview this morning. Patient states that her symptoms are feeling better in comparison to initial ED presentation. Inquiring when she can go home. Denies pleuritic chest pain.     Past Medical History  She has a past medical history of Acute CHF (CMS/[facility]), ANDREA (acute kidney injury) (CMS/[facility]), Anemia, Arthritis, ASHD (arteriosclerotic heart disease), At risk for falling, Atrial fibrillation (CMS/HCC), Cancer of kidney (CMS/HCC), Chronic diastolic CHF (congestive heart failure) (CMS/HCC), COPD (chronic obstructive pulmonary disease) (CMS/HCC), CVA (cerebral vascular accident) (CMS/HCC), Depression, Diabetes (CMS/HCC), Essential tremor, GERD  (gastroesophageal reflux disease), HTN (hypertension), Hyperlipidemia, Hypothyroidism, Hypoxia, Impacted cerumen, left ear, Left ventricular ejection fraction greater than 40%, Noncompliance, Personal history of other diseases of the respiratory system, Pulmonary edema, Pulmonary hypertension (CMS/HCC), Renal carcinoma, right (CMS/HCC), Respiratory failure (CMS/HCC), Vitamin D deficiency, and Weakness.    Surgical History  She has a past surgical history that includes MR angio chest w and wo IV contrast (2023); MR angio head wo IV contrast (2021);  section, classic; Shoulder surgery; Ankle surgery; Cataract extraction (Right); Nephrectomy (2021); and Cardiac catheterization (N/A, 2024).     Social History  She reports that she has been smoking cigarettes. She started smoking about 55 years ago. She has a 25.00 pack-year smoking history. She has never been exposed to tobacco smoke. She has never used smokeless tobacco. She reports that she does not currently use alcohol. She reports current drug use. Drug: Marijuana.    Family History  Family History   Problem Relation Name Age of Onset    Hypertension Mother      Diabetes Father      Heart disease Father      Cancer Sister      Cancer Brother      Diabetes Daughter      Asthma Daughter      Heart attack Daughter      Diabetes Maternal Grandfather      Heart disease Paternal Grandmother      Diabetes Other          Allergies  Adhesive tape-silicones, Amoxicillin, Bee venom protein (honey bee), Codeine, Doxycycline, Latex, Lisinopril, Prednisone, Citalopram, Oseltamivir, and Phenyleph-shark oil-glyc-pet    Review of Systems  Review of Systems   Constitutional:  Positive for chills. Negative for fever.   HENT: Negative.     Eyes: Negative.    Respiratory:  Positive for cough, shortness of breath and wheezing.    Cardiovascular:  Positive for leg swelling. Negative for chest pain and palpitations.   Gastrointestinal: Negative.   "  Genitourinary: Negative.    Musculoskeletal: Negative.    Neurological:  Positive for weakness. Negative for dizziness, facial asymmetry, light-headedness and headaches.          Physical Exam  Physical Exam  Vitals reviewed.   Constitutional:       Appearance: She is ill-appearing.   Cardiovascular:      Rate and Rhythm: Normal rate. Rhythm irregular.      Heart sounds: Normal heart sounds. No murmur heard.  Pulmonary:      Comments: BiPAP on during exam. Clear to auscultation with inhalation, however no exhalation sounds were noted.  Abdominal:      General: There is no distension.      Palpations: Abdomen is soft.      Tenderness: There is no abdominal tenderness.   Musculoskeletal:         General: Swelling present. No tenderness.      Right lower leg: Edema present.      Left lower leg: Edema present.   Neurological:      General: No focal deficit present.      Mental Status: She is alert. Mental status is at baseline.            Last Recorded Vitals  Blood pressure 124/77, pulse 59, temperature 37 °C (98.6 °F), temperature source Temporal, resp. rate 26, height 1.676 m (5' 6\"), weight 95.4 kg (210 lb 5.1 oz), SpO2 96 %.    Relevant Results  Scheduled medications  amiodarone, 200 mg, oral, Daily with breakfast  amLODIPine, 5 mg, oral, Daily  aspirin, 81 mg, oral, Daily  atorvastatin, 40 mg, oral, Daily  clopidogrel, 75 mg, oral, Daily  ferrous gluconate, 324 mg, oral, Daily with breakfast  [Held by provider] furosemide, 80 mg, oral, BID  heparin (porcine), 5,000 Units, subcutaneous, q8h  [Held by provider] insulin glargine, 14 Units, subcutaneous, Nightly  insulin lispro, 0-15 Units, subcutaneous, TID with meals  ipratropium-albuteroL, 3 mL, nebulization, TID  levothyroxine, 200 mcg, oral, Daily before breakfast  melatonin, 5 mg, oral, Daily  metoprolol succinate XL, 50 mg, oral, BID  oseltamivir, 30 mg, oral, Daily  pantoprazole, 40 mg, oral, BID  polyethylene glycol, 17 g, oral, Daily  potassium chloride " CR, 20 mEq, oral, Daily  potassium chloride CR, 40 mEq, oral, Daily  vancomycin, 750 mg, intravenous, q24h  venlafaxine XR, 75 mg, oral, Daily      Continuous medications  furosemide, 10 mg/hr, Last Rate: 10 mg/hr (02/19/24 1501)      PRN medications  PRN medications: acetaminophen **OR** acetaminophen **OR** acetaminophen, acetaminophen **OR** acetaminophen **OR** acetaminophen, albuterol, dextrose 10 % in water (D10W), dextrose, diphenhydrAMINE, glucagon, ipratropium-albuteroL, ondansetron **OR** ondansetron, oxygen    Results for orders placed or performed during the hospital encounter of 02/19/24 (from the past 24 hour(s))   Sars-CoV-2 and Influenza A/B PCR   Result Value Ref Range    Flu A Result Not Detected Not Detected    Flu B Result Detected (A) Not Detected    Coronavirus 2019, PCR Not Detected Not Detected   Blood Gas Venous Full Panel   Result Value Ref Range    POCT pH, Venous 7.25 (LL) 7.33 - 7.43 pH    POCT pCO2, Venous 77 (HH) 41 - 51 mm Hg    POCT pO2, Venous 34 (L) 35 - 45 mm Hg    POCT SO2, Venous 56 45 - 75 %    POCT Oxy Hemoglobin, Venous 55.4 45.0 - 75.0 %    POCT Hematocrit Calculated, Venous 21.0 (L) 36.0 - 46.0 %    POCT Sodium, Venous 137 136 - 145 mmol/L    POCT Potassium, Venous 5.4 (H) 3.5 - 5.3 mmol/L    POCT Chloride, Venous 102 98 - 107 mmol/L    POCT Ionized Calicum, Venous 1.17 1.10 - 1.33 mmol/L    POCT Glucose, Venous 92 74 - 99 mg/dL    POCT Lactate, Venous 0.5 0.4 - 2.0 mmol/L    POCT Base Excess, Venous 5.7 (H) -2.0 - 3.0 mmol/L    POCT HCO3 Calculated, Venous 33.8 (H) 22.0 - 26.0 mmol/L    POCT Hemoglobin, Venous 7.1 (L) 12.0 - 16.0 g/dL    POCT Anion Gap, Venous 7.0 (L) 10.0 - 25.0 mmol/L    Patient Temperature      FiO2 75 %   Comprehensive metabolic panel   Result Value Ref Range    Glucose 88 74 - 99 mg/dL    Sodium 139 136 - 145 mmol/L    Potassium 5.1 3.5 - 5.3 mmol/L    Chloride 100 98 - 107 mmol/L    Bicarbonate 31 21 - 32 mmol/L    Anion Gap 13 10 - 20 mmol/L    Urea  Nitrogen 61 (H) 6 - 23 mg/dL    Creatinine 3.02 (H) 0.50 - 1.05 mg/dL    eGFR 16 (L) >60 mL/min/1.73m*2    Calcium 7.9 (L) 8.6 - 10.3 mg/dL    Albumin 2.5 (L) 3.4 - 5.0 g/dL    Alkaline Phosphatase 71 33 - 136 U/L    Total Protein 5.5 (L) 6.4 - 8.2 g/dL    AST 20 9 - 39 U/L    Bilirubin, Total 0.3 0.0 - 1.2 mg/dL    ALT 13 7 - 45 U/L   CBC and Auto Differential   Result Value Ref Range    WBC 5.6 4.4 - 11.3 x10*3/uL    nRBC 0.0 0.0 - 0.0 /100 WBCs    RBC 2.36 (L) 4.00 - 5.20 x10*6/uL    Hemoglobin 7.0 (L) 12.0 - 16.0 g/dL    Hematocrit 24.6 (L) 36.0 - 46.0 %     (H) 80 - 100 fL    MCH 29.7 26.0 - 34.0 pg    MCHC 28.5 (L) 32.0 - 36.0 g/dL    RDW 13.6 11.5 - 14.5 %    Platelets 213 150 - 450 x10*3/uL    Neutrophils % 77.8 40.0 - 80.0 %    Immature Granulocytes %, Automated 1.1 (H) 0.0 - 0.9 %    Lymphocytes % 8.6 13.0 - 44.0 %    Monocytes % 11.6 2.0 - 10.0 %    Eosinophils % 0.4 0.0 - 6.0 %    Basophils % 0.5 0.0 - 2.0 %    Neutrophils Absolute 4.34 1.20 - 7.70 x10*3/uL    Immature Granulocytes Absolute, Automated 0.06 0.00 - 0.70 x10*3/uL    Lymphocytes Absolute 0.48 (L) 1.20 - 4.80 x10*3/uL    Monocytes Absolute 0.65 0.10 - 1.00 x10*3/uL    Eosinophils Absolute 0.02 0.00 - 0.70 x10*3/uL    Basophils Absolute 0.03 0.00 - 0.10 x10*3/uL   Magnesium   Result Value Ref Range    Magnesium 1.85 1.60 - 2.40 mg/dL   TSH with reflex to Free T4 if abnormal   Result Value Ref Range    Thyroid Stimulating Hormone 5.22 (H) 0.44 - 3.98 mIU/L   Thyroxine, Free   Result Value Ref Range    Thyroxine, Free 1.32 (H) 0.61 - 1.12 ng/dL   Blood Gas Arterial Full Panel   Result Value Ref Range    POCT pH, Arterial 7.29 (L) 7.38 - 7.42 pH    POCT pCO2, Arterial 68 (H) 38 - 42 mm Hg    POCT pO2, Arterial 69 (L) 85 - 95 mm Hg    POCT SO2, Arterial 96 94 - 100 %    POCT Oxy Hemoglobin, Arterial 94.2 94.0 - 98.0 %    POCT Hematocrit Calculated, Arterial 21.0 (L) 36.0 - 46.0 %    POCT Sodium, Arterial 137 136 - 145 mmol/L    POCT  Potassium, Arterial 5.3 3.5 - 5.3 mmol/L    POCT Chloride, Arterial 102 98 - 107 mmol/L    POCT Ionized Calcium, Arterial 1.19 1.10 - 1.33 mmol/L    POCT Glucose, Arterial 86 74 - 99 mg/dL    POCT Lactate, Arterial 0.4 0.4 - 2.0 mmol/L    POCT Base Excess, Arterial 5.3 (H) -2.0 - 3.0 mmol/L    POCT HCO3 Calculated, Arterial 32.7 (H) 22.0 - 26.0 mmol/L    POCT Hemoglobin, Arterial 7.1 (L) 12.0 - 16.0 g/dL    POCT Anion Gap, Arterial 8 (L) 10 - 25 mmo/L    Patient Temperature      FiO2 60 %   POCT GLUCOSE   Result Value Ref Range    POCT Glucose 88 74 - 99 mg/dL   Blood Gas Arterial Full Panel   Result Value Ref Range    POCT pH, Arterial 7.29 (L) 7.38 - 7.42 pH    POCT pCO2, Arterial 67 (H) 38 - 42 mm Hg    POCT pO2, Arterial 74 (L) 85 - 95 mm Hg    POCT SO2, Arterial 96 94 - 100 %    POCT Oxy Hemoglobin, Arterial 94.3 94.0 - 98.0 %    POCT Hematocrit Calculated, Arterial 22.0 (L) 36.0 - 46.0 %    POCT Sodium, Arterial 135 (L) 136 - 145 mmol/L    POCT Potassium, Arterial 5.4 (H) 3.5 - 5.3 mmol/L    POCT Chloride, Arterial 102 98 - 107 mmol/L    POCT Ionized Calcium, Arterial 1.17 1.10 - 1.33 mmol/L    POCT Glucose, Arterial 86 74 - 99 mg/dL    POCT Lactate, Arterial 0.4 0.4 - 2.0 mmol/L    POCT Base Excess, Arterial 4.8 (H) -2.0 - 3.0 mmol/L    POCT HCO3 Calculated, Arterial 32.2 (H) 22.0 - 26.0 mmol/L    POCT Hemoglobin, Arterial 7.4 (L) 12.0 - 16.0 g/dL    POCT Anion Gap, Arterial 6 (L) 10 - 25 mmo/L    Patient Temperature      FiO2 60 %   Blood Gas Arterial Full Panel   Result Value Ref Range    POCT pH, Arterial 7.31 (L) 7.38 - 7.42 pH    POCT pCO2, Arterial 62 (H) 38 - 42 mm Hg    POCT pO2, Arterial 95 85 - 95 mm Hg    POCT SO2, Arterial 99 94 - 100 %    POCT Oxy Hemoglobin, Arterial 97.1 94.0 - 98.0 %    POCT Hematocrit Calculated, Arterial 24.0 (L) 36.0 - 46.0 %    POCT Sodium, Arterial 135 (L) 136 - 145 mmol/L    POCT Potassium, Arterial 5.4 (H) 3.5 - 5.3 mmol/L    POCT Chloride, Arterial 101 98 - 107 mmol/L     POCT Ionized Calcium, Arterial 1.16 1.10 - 1.33 mmol/L    POCT Glucose, Arterial 81 74 - 99 mg/dL    POCT Lactate, Arterial 0.5 0.4 - 2.0 mmol/L    POCT Base Excess, Arterial 4.2 (H) -2.0 - 3.0 mmol/L    POCT HCO3 Calculated, Arterial 31.2 (H) 22.0 - 26.0 mmol/L    POCT Hemoglobin, Arterial 7.9 (L) 12.0 - 16.0 g/dL    POCT Anion Gap, Arterial 8 (L) 10 - 25 mmo/L    Patient Temperature      FiO2 60 %     NM Lung perfusion with spect/ct 02/19/2024    Narrative  Interpreted By:  Rolando Dillon and Osman Sena  STUDY:  NM LUNG PERFUSION WITH SPECT/CT;  2/19/2024 9:58 am    INDICATION:  Signs/Symptoms: 68-year-old female presented with known congestive  heart failure. R/o pulmoanry embolism.    COMPARISON:  Chest x-ray from 02/18/2024    ACCESSION NUMBER(S):  [id number]    ORDERING CLINICIAN:  SARAH LEMOS    TECHNIQUE:  DIVISION OF NUCLEAR MEDICINE  PERFUSION LUNG SCANS    Multiple perfusion images of the lungs were acquired after the  intravenous administration of 4.0 mCi of Tc-99m macroaggregated  albumin (MAA). In addition,SPECT/CT of the chest was performed.    FINDINGS:  Perfusion images of both lungs demonstrate mild heterogeneity  throughout the lung fields bilaterally. There are no distinct  segmental or subsegmental perfusion defects. Fissure sign seen at  right lung compatible with right pleural effusion.    Impression  There are no segmental or subsegmental perfusion defects in both  lungs indicating low probability for acute pulmonary embolism.  Moderate-sized right pleural effusion.      The interpretation above is based on modified PISAPED criteria.    This study was analyzed and interpreted at [place], [place].    Signed by: Rolando Dillon 2/19/2024 10:38 AM  Dictation workstation:   [id number]    XR chest 1 view 02/18/2024    Narrative  Interpreted By:  Erlin Lui,  STUDY:  XR CHEST 1 VIEW;  2/18/2024 4:32 pm    INDICATION:  Signs/Symptoms:Dyspnea history of  CHF.    COMPARISON:  CT scan from 02/01/2024.    ACCESSION NUMBER(S):  [id number]    ORDERING CLINICIAN:  RAINER BARR    TECHNIQUE:  Single AP portable view of the chest was obtained.    FINDINGS:  MEDIASTINUM/ LUNGS/ TAMMIE:  Cardiomegaly. Pulmonary vasculature and interstitium are prominent  and indistinct. Slight new pleural and parenchymal opacities at the  lateral left lung base. Persistent pleural and parenchymal opacities  in the mid and lower right hemithorax. These are grossly stable.  Stable calcification in the aorta.    No pneumothorax.  No tracheal deviation.  No abnormal hilar fullness or gross mass on either side.    BONES:  No lytic or blastic destructive bone lesion.  There is  mild-to-moderate disc space narrowing and endplate osteophytosis  throughout the thoracic spine.    UPPER ABDOMEN:  Grossly intact.    Impression  Findings of ongoing CHF.    Grossly stable pleural and parenchymal opacities in the mid and lower  right hemithorax. Mild new left pleural effusion with left basilar  atelectasis or edema.    MACRO:  None    Signed by: Erlin Lui 2/18/2024 4:59 PM  Dictation workstation:   [id number]      Vascular US lower extremity venous duplex bilateral 02/18/2024    Narrative  Interpreted By:  Washington Peraza,  STUDY:  [facility] US LOWER EXTREMITY VENOUS DUPLEX BILATERAL  2/18/2024 8:09 pm    INDICATION:  67 y/o   F with  Signs/Symptoms:d dimer > 13,000. LMP:  Unknown.    COMPARISON:  None.    ACCESSION NUMBER(S):  [id number]    ORDERING CLINICIAN:  RAINER BARR    TECHNIQUE:  Routine ultrasound of the  bilateral lower extremity was performed  with duplex Doppler (color and spectral) evaluation.   Static images  were obtained for remote interpretation.    FINDINGS:  THIGH VEINS:  The common femoral, femoral, popliteal, proximal medial  saphenous, and deep femoral veins are patent and free of thrombus.  The veins are normally compressible.  They demonstrate normal phasic  flow and  augmentation response.    CALF VEINS: Significant soft tissue swelling somewhat limits  assessment.    Impression  Negative study.  No deep venous thrombosis of the  bilateral lower  extremity.  Soft tissue swelling limits assessment    MACRO:  None    Signed by: Washington Peraza 2/18/2024 8:41 PM  Dictation workstation:   [id number]       Assessment/Plan   #Acute on chronic hypoxic hypercapnic respiratory failure 2/2 HFpEF exacerbation, COPD exacerbation  #Respiratory acidosis requiring BiPAP  #Influenza B positive  #Rule out pneumonia  - V/Q scan showing low probability for PE  - Recommending to start prednisone 40 mg daily  - Scheduled DuoNebs  - Procalcitonin ordered and pending  - Patient tested positive for Influenza B this AM. Continue Tamiflu  - Diuresis as tolerated per primary/cardiology. If symptoms do not resolve with diuresis, will need repeat evaluation and potential thoracentesis  - Recommending to keep BiPAP on overnight, get a repeat ABG tomorrow morning to assess the need for continued BiPAP.     Hailey Sue MD

## 2024-02-19 NOTE — CONSULTS
Inpatient consult to Cardiology  Consult performed by: CARLENE Villegas-CNP  Consult ordered by: Josette Damian MD  Reason for consult: CHF          History Of Present Illness  Daphne Morris is a 68 y.o. female is a poor historian due to mental status and on bipap. According to the medical records, she was sent to the ER due to worsening shortness of breath and a moist cough. In the ER her pulse ox was found to be 82%, VBG showed respiratory acidosis and she was placed on Bipap and transferred to [facility].     Lab work showed glucose 150, sodium 138, potassium 5.2, BUN/creatinine 58/2.88, BNP 1444, troponin 43, 40, hemoglobin A1c 11.9%, D-dimer 13,011, WBC 6.7, H&H 8.0/26.6, chest x-ray showed ongoing CHF with a right pleural effusion and a new left pleural effusion.  VBG showed pH 7.28, pCO2 48, pO2 40, bicarb 22.6. She is positive for influenza B.     VQ scan negative for PE showed a moderate right pleural effusion.     She was started on a lasix gtt for significant fluid overload.    Past Medical History  Past Medical History:   Diagnosis Date    Acute CHF (CMS/[facility])     04/2023    ANDREA (acute kidney injury) (CMS/[facility])     Anemia     Arthritis     ASHD (arteriosclerotic heart disease)     At risk for falling     Atrial fibrillation (CMS/[facility])     Cancer of kidney (CMS/[facility])     Chronic diastolic CHF (congestive heart failure) (CMS/[facility])     COPD (chronic obstructive pulmonary disease) (CMS/[facility])     CVA (cerebral vascular accident) (CMS/[facility])     2021- righ sided weakness    Depression     Diabetes (CMS/[facility])     Essential tremor     GERD (gastroesophageal reflux disease)     HTN (hypertension)     Hyperlipidemia     Hypothyroidism     s/p radioactive iodine treatment    Hypoxia     Impacted cerumen, left ear     Impacted cerumen of left ear    Left ventricular ejection fraction greater than 40%     40-45%    Noncompliance     Personal history of other diseases of the respiratory system     History of acute  bronchitis    Pulmonary edema     Pulmonary hypertension (CMS/HCC)     Renal carcinoma, right (CMS/HCC)     s/p partial nephrectomy 2021    Respiratory failure (CMS/HCC)     Vitamin D deficiency     Weakness    HTN, HLD, HFpEF, pericardial effusion, peripheral vascular disease, PAF, T2DM, CVA, s/p ARIS closure device, COPD, CKD stage 3b (s/p right nephrectomy / malignancy), iron deficiency anemia, hypothyroidism, GERD, depression      Surgical History  Past Surgical History:   Procedure Laterality Date    ANKLE SURGERY          CARDIAC CATHETERIZATION N/A 2024    Procedure: Right Heart Cath;  Surgeon: Nicholas Antonio MD;  Location: [facility] Cardiac Cath Lab;  Service: Cardiovascular;  Laterality: N/A;    CATARACT EXTRACTION Right     2019     SECTION, CLASSIC      MR CHEST ANGIO W AND WO IV CONTRAST  2023    MR CHEST ANGIO W AND WO IV CONTRAST 2023 [facility] MRI    MR HEAD ANGIO WO IV CONTRAST  2021    MR HEAD ANGIO WO IV CONTRAST [facility]    NEPHRECTOMY  2021    SHOULDER SURGERY              Social History  She reports that she has been smoking cigarettes. She started smoking about 55 years ago. She has a 25.00 pack-year smoking history. She has never been exposed to tobacco smoke. She has never used smokeless tobacco. She reports that she does not currently use alcohol. She reports current drug use. Drug: Marijuana.    Family History  Family History   Problem Relation Name Age of Onset    Hypertension Mother      Diabetes Father      Heart disease Father      Cancer Sister      Cancer Brother      Diabetes Daughter      Asthma Daughter      Heart attack Daughter      Diabetes Maternal Grandfather      Heart disease Paternal Grandmother      Diabetes Other          Allergies  Adhesive tape-silicones, Amoxicillin, Bee venom protein (honey bee), Codeine, Doxycycline, Latex, Lisinopril, Prednisone, Citalopram, Oseltamivir, and Phenyleph-shark  "oil-glyc-pet    Review of Systems  Review of Systems   Unable to perform ROS: Mental status change          Physical Exam  Constitutional: Well developed, drowsy x3, no distress, alert and cooperative  Eyes: PERRL, EOMI, clear sclera  ENMT: mucous membranes moist, no apparent injury, no lesions seen  Head/Neck: Neck supple, no apparent injury, thyroid without mass or tenderness, No JVD, trachea midline, no bruits  Respiratory/Thorax: Patent airways, CTAB, normal breath sounds with good chest expansion, thorax symmetric  Cardiovascular: Regular, rate and rhythm, no murmurs, 2+ equal pulses of the extremities, normal S 1and S 2  Gastrointestinal: Nondistended, soft, non-tender, no rebound tenderness or guarding, no masses palpable, no organomegaly, +BS, no bruits  Musculoskeletal: ROM intact, no joint swelling, normal strength  Extremities: generalized edema  Neurological: drowsy, intact senses, motor, response and reflexes, normal strength  Lymphatic: No significant lymphadenopathy  Skin: Warm and dry, no lesions, no rashes       Last Recorded Vitals  Blood pressure 124/77, pulse 59, temperature 37 °C (98.6 °F), temperature source Temporal, resp. rate 24, height 1.676 m (5' 6\"), weight 95.4 kg (210 lb 5.1 oz), SpO2 96 %.    Relevant Results    Scheduled medications  amiodarone, 200 mg, oral, Daily with breakfast  amLODIPine, 5 mg, oral, Daily  aspirin, 81 mg, oral, Daily  atorvastatin, 40 mg, oral, Daily  clopidogrel, 75 mg, oral, Daily  ferrous gluconate, 324 mg, oral, Daily with breakfast  [Held by provider] furosemide, 80 mg, oral, BID  heparin (porcine), 5,000 Units, subcutaneous, q8h  [Held by provider] insulin glargine, 14 Units, subcutaneous, Nightly  insulin lispro, 0-15 Units, subcutaneous, TID with meals  ipratropium-albuteroL, 3 mL, nebulization, TID  levothyroxine, 200 mcg, oral, Daily before breakfast  melatonin, 5 mg, oral, Daily  metoprolol succinate XL, 50 mg, oral, BID  oseltamivir, 30 mg, oral, " Daily  pantoprazole, 40 mg, oral, BID  polyethylene glycol, 17 g, oral, Daily  potassium chloride CR, 20 mEq, oral, Daily  potassium chloride CR, 40 mEq, oral, Daily  vancomycin, 750 mg, intravenous, q24h  venlafaxine XR, 75 mg, oral, Daily      Continuous medications  furosemide, 10 mg/hr, Last Rate: 10 mg/hr (02/19/24 1501)      PRN medications  PRN medications: acetaminophen **OR** acetaminophen **OR** acetaminophen, acetaminophen **OR** acetaminophen **OR** acetaminophen, albuterol, dextrose 10 % in water (D10W), dextrose, diphenhydrAMINE, glucagon, ipratropium-albuteroL, ondansetron **OR** ondansetron, oxygen    Results for orders placed or performed during the hospital encounter of 02/19/24 (from the past 24 hour(s))   Sars-CoV-2 and Influenza A/B PCR   Result Value Ref Range    Flu A Result Not Detected Not Detected    Flu B Result Detected (A) Not Detected    Coronavirus 2019, PCR Not Detected Not Detected   Blood Gas Venous Full Panel   Result Value Ref Range    POCT pH, Venous 7.25 (LL) 7.33 - 7.43 pH    POCT pCO2, Venous 77 (HH) 41 - 51 mm Hg    POCT pO2, Venous 34 (L) 35 - 45 mm Hg    POCT SO2, Venous 56 45 - 75 %    POCT Oxy Hemoglobin, Venous 55.4 45.0 - 75.0 %    POCT Hematocrit Calculated, Venous 21.0 (L) 36.0 - 46.0 %    POCT Sodium, Venous 137 136 - 145 mmol/L    POCT Potassium, Venous 5.4 (H) 3.5 - 5.3 mmol/L    POCT Chloride, Venous 102 98 - 107 mmol/L    POCT Ionized Calicum, Venous 1.17 1.10 - 1.33 mmol/L    POCT Glucose, Venous 92 74 - 99 mg/dL    POCT Lactate, Venous 0.5 0.4 - 2.0 mmol/L    POCT Base Excess, Venous 5.7 (H) -2.0 - 3.0 mmol/L    POCT HCO3 Calculated, Venous 33.8 (H) 22.0 - 26.0 mmol/L    POCT Hemoglobin, Venous 7.1 (L) 12.0 - 16.0 g/dL    POCT Anion Gap, Venous 7.0 (L) 10.0 - 25.0 mmol/L    Patient Temperature      FiO2 75 %   Comprehensive metabolic panel   Result Value Ref Range    Glucose 88 74 - 99 mg/dL    Sodium 139 136 - 145 mmol/L    Potassium 5.1 3.5 - 5.3 mmol/L     Chloride 100 98 - 107 mmol/L    Bicarbonate 31 21 - 32 mmol/L    Anion Gap 13 10 - 20 mmol/L    Urea Nitrogen 61 (H) 6 - 23 mg/dL    Creatinine 3.02 (H) 0.50 - 1.05 mg/dL    eGFR 16 (L) >60 mL/min/1.73m*2    Calcium 7.9 (L) 8.6 - 10.3 mg/dL    Albumin 2.5 (L) 3.4 - 5.0 g/dL    Alkaline Phosphatase 71 33 - 136 U/L    Total Protein 5.5 (L) 6.4 - 8.2 g/dL    AST 20 9 - 39 U/L    Bilirubin, Total 0.3 0.0 - 1.2 mg/dL    ALT 13 7 - 45 U/L   CBC and Auto Differential   Result Value Ref Range    WBC 5.6 4.4 - 11.3 x10*3/uL    nRBC 0.0 0.0 - 0.0 /100 WBCs    RBC 2.36 (L) 4.00 - 5.20 x10*6/uL    Hemoglobin 7.0 (L) 12.0 - 16.0 g/dL    Hematocrit 24.6 (L) 36.0 - 46.0 %     (H) 80 - 100 fL    MCH 29.7 26.0 - 34.0 pg    MCHC 28.5 (L) 32.0 - 36.0 g/dL    RDW 13.6 11.5 - 14.5 %    Platelets 213 150 - 450 x10*3/uL    Neutrophils % 77.8 40.0 - 80.0 %    Immature Granulocytes %, Automated 1.1 (H) 0.0 - 0.9 %    Lymphocytes % 8.6 13.0 - 44.0 %    Monocytes % 11.6 2.0 - 10.0 %    Eosinophils % 0.4 0.0 - 6.0 %    Basophils % 0.5 0.0 - 2.0 %    Neutrophils Absolute 4.34 1.20 - 7.70 x10*3/uL    Immature Granulocytes Absolute, Automated 0.06 0.00 - 0.70 x10*3/uL    Lymphocytes Absolute 0.48 (L) 1.20 - 4.80 x10*3/uL    Monocytes Absolute 0.65 0.10 - 1.00 x10*3/uL    Eosinophils Absolute 0.02 0.00 - 0.70 x10*3/uL    Basophils Absolute 0.03 0.00 - 0.10 x10*3/uL   Magnesium   Result Value Ref Range    Magnesium 1.85 1.60 - 2.40 mg/dL   TSH with reflex to Free T4 if abnormal   Result Value Ref Range    Thyroid Stimulating Hormone 5.22 (H) 0.44 - 3.98 mIU/L   Thyroxine, Free   Result Value Ref Range    Thyroxine, Free 1.32 (H) 0.61 - 1.12 ng/dL   Blood Gas Arterial Full Panel   Result Value Ref Range    POCT pH, Arterial 7.29 (L) 7.38 - 7.42 pH    POCT pCO2, Arterial 68 (H) 38 - 42 mm Hg    POCT pO2, Arterial 69 (L) 85 - 95 mm Hg    POCT SO2, Arterial 96 94 - 100 %    POCT Oxy Hemoglobin, Arterial 94.2 94.0 - 98.0 %    POCT Hematocrit  Calculated, Arterial 21.0 (L) 36.0 - 46.0 %    POCT Sodium, Arterial 137 136 - 145 mmol/L    POCT Potassium, Arterial 5.3 3.5 - 5.3 mmol/L    POCT Chloride, Arterial 102 98 - 107 mmol/L    POCT Ionized Calcium, Arterial 1.19 1.10 - 1.33 mmol/L    POCT Glucose, Arterial 86 74 - 99 mg/dL    POCT Lactate, Arterial 0.4 0.4 - 2.0 mmol/L    POCT Base Excess, Arterial 5.3 (H) -2.0 - 3.0 mmol/L    POCT HCO3 Calculated, Arterial 32.7 (H) 22.0 - 26.0 mmol/L    POCT Hemoglobin, Arterial 7.1 (L) 12.0 - 16.0 g/dL    POCT Anion Gap, Arterial 8 (L) 10 - 25 mmo/L    Patient Temperature      FiO2 60 %   POCT GLUCOSE   Result Value Ref Range    POCT Glucose 88 74 - 99 mg/dL   Blood Gas Arterial Full Panel   Result Value Ref Range    POCT pH, Arterial 7.29 (L) 7.38 - 7.42 pH    POCT pCO2, Arterial 67 (H) 38 - 42 mm Hg    POCT pO2, Arterial 74 (L) 85 - 95 mm Hg    POCT SO2, Arterial 96 94 - 100 %    POCT Oxy Hemoglobin, Arterial 94.3 94.0 - 98.0 %    POCT Hematocrit Calculated, Arterial 22.0 (L) 36.0 - 46.0 %    POCT Sodium, Arterial 135 (L) 136 - 145 mmol/L    POCT Potassium, Arterial 5.4 (H) 3.5 - 5.3 mmol/L    POCT Chloride, Arterial 102 98 - 107 mmol/L    POCT Ionized Calcium, Arterial 1.17 1.10 - 1.33 mmol/L    POCT Glucose, Arterial 86 74 - 99 mg/dL    POCT Lactate, Arterial 0.4 0.4 - 2.0 mmol/L    POCT Base Excess, Arterial 4.8 (H) -2.0 - 3.0 mmol/L    POCT HCO3 Calculated, Arterial 32.2 (H) 22.0 - 26.0 mmol/L    POCT Hemoglobin, Arterial 7.4 (L) 12.0 - 16.0 g/dL    POCT Anion Gap, Arterial 6 (L) 10 - 25 mmo/L    Patient Temperature      FiO2 60 %   Blood Gas Arterial Full Panel   Result Value Ref Range    POCT pH, Arterial 7.31 (L) 7.38 - 7.42 pH    POCT pCO2, Arterial 62 (H) 38 - 42 mm Hg    POCT pO2, Arterial 95 85 - 95 mm Hg    POCT SO2, Arterial 99 94 - 100 %    POCT Oxy Hemoglobin, Arterial 97.1 94.0 - 98.0 %    POCT Hematocrit Calculated, Arterial 24.0 (L) 36.0 - 46.0 %    POCT Sodium, Arterial 135 (L) 136 - 145 mmol/L     POCT Potassium, Arterial 5.4 (H) 3.5 - 5.3 mmol/L    POCT Chloride, Arterial 101 98 - 107 mmol/L    POCT Ionized Calcium, Arterial 1.16 1.10 - 1.33 mmol/L    POCT Glucose, Arterial 81 74 - 99 mg/dL    POCT Lactate, Arterial 0.5 0.4 - 2.0 mmol/L    POCT Base Excess, Arterial 4.2 (H) -2.0 - 3.0 mmol/L    POCT HCO3 Calculated, Arterial 31.2 (H) 22.0 - 26.0 mmol/L    POCT Hemoglobin, Arterial 7.9 (L) 12.0 - 16.0 g/dL    POCT Anion Gap, Arterial 8 (L) 10 - 25 mmo/L    Patient Temperature      FiO2 60 %       NM Lung perfusion with spect/ct   Final Result   There are no segmental or subsegmental perfusion defects in both   lungs indicating low probability for acute pulmonary embolism.   Moderate-sized right pleural effusion.             The interpretation above is based on modified PISAPED criteria.        This study was analyzed and interpreted at [place], [place].        Signed by: Rolando Dillon 2/19/2024 10:38 AM   Dictation workstation:   [id number]          Transthoracic Echo (TTE) Complete    Result Date: 2/5/2024   [facility], [address] [place] [address]               Tel [phone] and Fax [phone] TRANSTHORACIC ECHOCARDIOGRAM REPORT  Patient Name:      MOE OTERO ZULMA      Reading Physician:    [id number] Nicholas Antonio MD Study Date:        2/5/2024             Ordering Provider:    [id number] DOMONIQUE PURCELL MRN/PID:           [id number]             Fellow: Accession#:        [id number]         Nurse: Date of Birth/Age: 1955 / 68 years Sonographer:          Nina Angel                                                               NALLELY Gender:            F                    Additional Staff: Height:            167.64 cm            Admit Date:           2/2/2024 Weight:            102.51 kg            Admission Status:      Inpatient -                                                               Routine BSA:               2.11 m2              Encounter#:           [id number]                                         Department Location:  [facility] Non                                                               Invasive Blood Pressure: 147 /65 mmHg Study Type:    TRANSTHORACIC ECHO (TTE) COMPLETE Diagnosis/ICD: Acute combined systolic (congestive) and diastolic (congestive)                heart failure (CHF)-I50.41 Indication:    Congestive Heart Failure CPT Code:      Echo Complete w Full Doppler-42851 Patient History: Diabetes:         Yes Pertinent         A-Fib, HTN, Dyspnea and LE Edema. Watchman device, elevated History:          BNP,. Study Detail: The following Echo studies were performed: 2D, M-Mode, Doppler and               color flow.  PHYSICIAN INTERPRETATION: Left Ventricle: The left ventricular systolic function is normal, with an estimated ejection fraction of 60-65%. There are no regional wall motion abnormalities. The left ventricular cavity size is normal. Spectral Doppler shows an impaired relaxation pattern of left ventricular diastolic filling. Left Atrium: The left atrium is moderately dilated. Right Ventricle: The right ventricle is mildly enlarged. There is mildly reduced right ventricular systolic function. Right Atrium: The right atrium is moderately dilated. Aortic Valve: The aortic valve is trileaflet. There is mild aortic valve cusp calcification. There is trivial aortic valve regurgitation. The peak instantaneous gradient of the aortic valve is 14.1 mmHg. The mean gradient of the aortic valve is 9.1 mmHg. Mitral Valve: The mitral valve is normal in structure. There is mild mitral annular calcification. There is mild to moderate mitral valve regurgitation. Tricuspid Valve: The tricuspid valve is structurally normal. There is moderate tricuspid regurgitation. Pulmonic Valve: The pulmonic valve is  structurally normal. There is mild pulmonic valve regurgitation. Pericardium: There is a small to moderate pericardial effusion posterior to the left ventricle. There is no evidence of cardiac tamponade. Aorta: The aortic root is normal. Pulmonary Artery: The tricuspid regurgitant velocity is 3.04 m/s, and with an estimated right atrial pressure of 3 mmHg, the estimated pulmonary artery pressure is mildly elevated with the RVSP at 39.9 mmHg. Pulmonary Veins: The pulmonary veins appear normal and return normally to the left atrium. Systemic Veins: The inferior vena cava appears to be of normal size. There is IVC inspiratory collapse greater than 50%.  CONCLUSIONS:  1. Left ventricular systolic function is normal with a 60-65% estimated ejection fraction.  2. Spectral Doppler shows an impaired relaxation pattern of left ventricular diastolic filling.  3. There is mildly reduced right ventricular systolic function.  4. The left atrium is moderately dilated.  5. The right atrium is moderately dilated.  6. There is a small to moderate pericardial effusion.  7. There is no evidence of cardiac tamponade.  8. Mild to moderate mitral valve regurgitation.  9. Moderate tricuspid regurgitation visualized. 10. Normal CVP. Estimated PASP around 45 mm Hg. QUANTITATIVE DATA SUMMARY: 2D MEASUREMENTS:                           Normal Ranges: IVSd:          1.52 cm    (0.6-1.1cm) LVPWd:         1.42 cm    (0.6-1.1cm) LVIDd:         4.35 cm    (3.9-5.9cm) LVIDs:         2.94 cm LV Mass Index: 120.7 g/m2 LV % FS        32.3 % LA VOLUME:                              Normal Ranges: LA Vol A4C:       79.5 ml    (22+/-6mL/m2) LA Vol A2C:       78.2 ml LA Vol BP:        79.7 ml LA Vol Index A4C: 37.7ml/m2 LA Vol Index A2C: 37.1 ml/m2 LA Vol Index BP:  37.9 ml/m2 LA Volume Index:  37.8 ml/m2 LA Vol A4C:       76.0 ml LA Vol A2C:       73.9 ml RA VOLUME BY A/L METHOD:                       Normal Ranges: RA Area A4C: 17.8 cm2 M-MODE  MEASUREMENTS:                  Normal Ranges: Ao Root: 3.00 cm (2.0-3.7cm) LAs:     5.08 cm (2.7-4.0cm) LV SYSTOLIC FUNCTION BY 2D PLANIMETRY (MOD):                     Normal Ranges: EF-A4C View: 63.4 % (>=55%) EF-A2C View: 63.8 % EF-Biplane:  62.5 % LV DIASTOLIC FUNCTION:                             Normal Ranges: MV Peak E:      1.33 m/s    (0.7-1.2 m/s) MV Peak A:      0.53 m/s    (0.42-0.7 m/s) E/A Ratio:      2.52        (1.0-2.2) MV e'           0.06 m/s    (>8.0) MV lateral e'   0.06 m/s MV medial e'    0.06 m/s MV A Dur:       110.73 msec E/e' Ratio:     22.09       (<8.0) PulmV Sys Mike:  53.97 cm/s PulmV Dugan Mike: 77.63 cm/s PulmV S/D Mike:  0.70 MITRAL VALVE:                 Normal Ranges: MV DT: 245 msec (150-240msec) MITRAL INSUFFICIENCY:                         Normal Ranges: MR VTI:     171.38 cm MR Vmax:    489.73 cm/s MR Volume:  20.07 ml MR Flow Rt: 57.36 ml/s MR EROA:    0.12 cm2 AORTIC VALVE:                                    Normal Ranges: AoV Vmax:                1.88 m/s  (<=1.7m/s) AoV Peak P.1 mmHg (<20mmHg) AoV Mean P.1 mmHg  (1.7-11.5mmHg) LVOT Max Mike:            0.97 m/s  (<=1.1m/s) AoV VTI:                 46.22 cm  (18-25cm) LVOT VTI:                24.89 cm LVOT Diameter:           1.96 cm   (1.8-2.4cm) AoV Area, VTI:           1.62 cm2  (2.5-5.5cm2) AoV Area,Vmax:           1.56 cm2  (2.5-4.5cm2) AoV Dimensionless Index: 0.54  RIGHT VENTRICLE: RV Basal 3.80 cm RV Mid   2.80 cm RV Major 8.0 cm TAPSE:   18.0 mm RV s'    0.13 m/s TRICUSPID VALVE/RVSP:                             Normal Ranges: Peak TR Velocity: 3.04 m/s RV Syst Pressure: 39.9 mmHg (< 30mmHg) PULMONIC VALVE:                      Normal Ranges: PV Max Mike: 1.0 m/s  (0.6-0.9m/s) PV Max PG:  3.9 mmHg Pulmonary Veins: PulmV Dugan Mike: 77.63 cm/s PulmV S/D Mike:  0.70 PulmV Sys Mike:  53.97 cm/s  [id number] Nicholas Antonio MD Electronically signed on 2024 at 5:06:05 PM  ** Final **          Assessment/Plan       1. Acute on chronic hypoxic/hypercapnic respiratory failure 2/2 acute on chronic diastolic CHF, influenza, COPD   -Isolation  -Tamiflu  -BNP 1444  -CXR showed CHF, Grossly stable pleural and parenchymal opacities in the mid and lower right hemithorax. Mild new left pleural effusion  -VQ scan showed moderate right pleural effusion  -bronchodilators  -Steroids  -oxygen/bipap support  -Significant leg to abd swelling and shortness of breath  -Strict I & Os  -Daily weights  -2gm na diet  -Echo as above  -Unable to start SGLT2 inhibitors with current GFR  -s/p RHC during last admit showed MEAN PA 30 MILD PULMONARY HTN MEAN RA 9 MM HG MEAN PCWP 23 MM HG CARDIAC OUTPUT 8.03 CI 3.79 NO SHUNTS   -Agree with Lasix gtt but most likely will need dialysis  -Monitor on tele     2. Elevated troponins-non MI elevation 2/2 influenza, respiratory distress  -Denies chest pain  -Does have shortness of breath-worsening for weeks  -Unable to have LHC due to creatinine  -Cont asa, plavix, statin, metoprolol  -Monitor on tele     3. Paroxysmal atrial fibrillation with RVR  -s/p Watchman in Aug 2023  -Currently SB/SR  -Cont amiodarone and metoprolol for rate control  -Monitor on tele     4. Hypertension  -stable  -2gm na diet  -cont meds  -monitor     5. Anemia  -Appear stable     6. Acute on Chronic kidney disease  -s/p nephrectomy  -Renal US negative  -Had been seen by renal during previous admit  -May need dialysis     7. Diabetes  -ISS  -Accuchecks     8. Hypothyroidism  -Cont synthroid       Kacey Monique, CARLENE-CNP

## 2024-02-19 NOTE — CARE PLAN
The clinical goals for the shift include Keep Spo2 92% or greater.      Problem: Respiratory  Goal: Clear secretions with interventions this shift  Outcome: Progressing  Goal: Minimize anxiety/maximize coping throughout shift  Outcome: Progressing  Goal: Wean oxygen to maintain O2 saturation per order/standard this shift  Outcome: Progressing     Problem: Skin  Goal: Promote skin healing  Outcome: Progressing  Flowsheets (Taken 2/19/2024 5048)  Promote skin healing: Turn/reposition every 2 hours/use positioning/transfer devices

## 2024-02-19 NOTE — PROGRESS NOTES
PLACE OF SERVICE:  [facility]    This is new/initial history and physical.    Subjective   Patient ID: Daphne Morris is a 68 y.o. female who presents for Follow-up.    Ms. Cecelia Morris is a 68-year-old female with history of congestive heart failure, with pulmonary edema.  She suffers from COPD.  She has several medical issues, unable to care for herself.  Today, she appears somewhat confused and appears to have a mental status change.    Review of Systems   Constitutional:  Negative for chills and fever.   Cardiovascular:  Negative for chest pain.   All other systems reviewed and are negative.    Objective   /84   Pulse 88   Temp 36.7 °C (98 °F)   Resp 18     Physical Exam  Vitals reviewed.   Constitutional:       Comments: This is a well-developed, well-nourished female, lying in bed, appearing weak and lethargic.   HENT:      Right Ear: Tympanic membrane, ear canal and external ear normal.      Left Ear: Tympanic membrane, ear canal and external ear normal.   Eyes:      General: No scleral icterus.     Pupils: Pupils are equal, round, and reactive to light.   Neck:      Vascular: No carotid bruit.   Cardiovascular:      Heart sounds: Normal heart sounds, S1 normal and S2 normal. No murmur heard.     No friction rub.   Pulmonary:      Effort: Pulmonary effort is normal.      Breath sounds: Decreased breath sounds (throughout.) present.   Abdominal:      Palpations: There is no hepatomegaly, splenomegaly or mass.   Musculoskeletal:         General: No swelling or deformity. Normal range of motion.      Cervical back: Neck supple.      Right lower leg: Edema present.      Left lower leg: Edema present.      Comments: Extremities show right-sided weakness.   Lymphadenopathy:      Cervical: No cervical adenopathy.      Upper Body:      Right upper body: No axillary adenopathy.      Left upper body: No axillary adenopathy.      Lower Body: No right inguinal  adenopathy. No left inguinal adenopathy.   Neurological:      Mental Status: She is oriented to person, place, and time.      Cranial Nerves: Cranial nerves 2-12 are intact. No cranial nerve deficit.      Sensory: No sensory deficit.      Motor: Motor function is intact. No weakness.      Gait: Gait is intact.      Deep Tendon Reflexes: Reflexes normal.   Psychiatric:         Mood and Affect: Mood normal. Mood is not anxious or depressed. Affect is not angry.         Behavior: Behavior is not agitated.         Thought Content: Thought content normal.         Judgment: Judgment normal.      Comments: She appears confused with mental status change.     LAB WORK: Laboratory studies were reviewed.    Assessment/Plan   Problem List Items Addressed This Visit             ICD-10-CM       Cardiac and Vasculature    Hypertension I10    Hyperlipidemia, unspecified E78.5       Endocrine/Metabolic    Hypothyroidism E03.9    Type 2 diabetes mellitus (CMS/[facility]) E11.9       Hematology and Neoplasia    Absolute anemia D64.9       Mental Health    Depression F32.A       Neuro    Altered mental status - Primary R41.82     Other Visit Diagnoses         Codes    Confusion     R41.0    Congestive heart failure, unspecified HF chronicity, unspecified heart failure type (CMS/[facility])     I50.9    COPD on long-term inhaled steroid therapy (CMS/[facility])     J44.9, Z79.51    Hypoxia     R09.02    Weakness     R53.1    At risk for falling     Z91.81    Chronic kidney disease, unspecified CKD stage     N18.9    Cerebrovascular accident (CVA), unspecified mechanism (CMS/[facility])     I63.9    Atrial fibrillation, unspecified type (CMS/[facility])     I48.91        1. Confusion with altered mental status.  The patient will go to the Emergency Room for evaluation.  2. Congestive heart failure, on diuretic.  3. COPD, on bronchodilator therapy.  4. Hypoxia, on oxygen.  5. Weakness, on PT/OT.  6. Fall risk, fall precaution.  7. CKD, monitor BMP.  8. CVA, on supportive  care.  9. Anemia, follow CBC.  10. Diabetes, on insulin.  11. Hypothyroidism, on levothyroxine.  12. Hyperlipidemia, on statin.  13. Depression, on medication.  14. Hypertension, med controlled.  15. Atrial fibrillation, rate controlled.    Scribe Attestation  By signing my name below, Ann RODRIGUEZ Scribe attest that this documentation has been prepared under the direction and in the presence of Cale Lowe MD.   .s

## 2024-02-19 NOTE — PROGRESS NOTES
02/19/24 1238   Discharge Planning   Living Arrangements Other (Comment)  ([facility])   Support Systems Friends/neighbors   Assistance Needed Patient arrived from [facility], patient was hospitalized here from 2/1-2/15, prior to that was home alone, A&0x3, ambulates with rollator baseline, doesn't drive, while home was on 3 L at  only, has NPdoing home visits Poly Diaz,   Type of Residence Skilled nursing facility   Do you have animals or pets at home? No   Who is requesting discharge planning? Provider   Home or Post Acute Services Post acute facilities (Rehab/SNF/etc)   Type of Post Acute Facility Services Skilled nursing   Patient expects to be discharged to: TBD: From [facility], will need PT/ OT when appropriate, would need precert to return, currently on continuous bipap, ? need for new dialysis   Does the patient need discharge transport arranged? Yes   RoundTrip coordination needed? Yes   Has discharge transport been arranged? No   Financial Resource Strain   How hard is it for you to pay for the very basics like food, housing, medical care, and heating? Pt Declined   Housing Stability   In the last 12 months, was there a time when you were not able to pay the mortgage or rent on time? N   In the last 12 months, how many places have you lived? 1   In the last 12 months, was there a time when you did not have a steady place to sleep or slept in a shelter (including now)? N   Transportation Needs   In the past 12 months, has lack of transportation kept you from medical appointments or from getting medications? no   In the past 12 months, has lack of transportation kept you from meetings, work, or from getting things needed for daily living? No     2/20/24 @ 1512: Discharge plan TBD,  From [facility], will need PT/ OT when appropriate, would need precert to return, off an on bipap, now with some intermittant confusion, ? new dilaysis, palliative care consulted for [facility]. Will  follow. Discussed in interdisciplinary rounds.

## 2024-02-19 NOTE — H&P
Patient: Daphne Morris Age: 68 y.o.   Gender: female Room/Bed: 252/252-A     Attending: Josette Damian MD  Code Status:  Full Code    Chief Complaint:  Patient: Daphne Morrsi is a 68 y.o. female who presented to the hospital for Dyspnea.     History of Presenting Illness  Daphne Morris is a 68 y.o. female with heart failure with preserved ejection fraction, chronic respiratory failure secondary to CHF/COPD/pleural effusion 2 L of oxygen at baseline, history of A-fib, hypertension, prior CVA anemia, CKD stage IV, DM type II, anemia, hypothyroidism, and pericardial effusion who presented to the ED from her nursing facility with concern of dyspnea and hypoxia, patient was recently admitted to [facility] on 02/01-02/15 for AHRF, COPDae, ANDREA/cardiorenal syndrome she received Lasix and underwent right thoracentesis as well as diagnostic right heart cath which showed mild pulmonary hypertension.  she presented yesterday to [facility] with complaints of shortness of breath that started earlier during the day on O2 satting 82% on arrival, in the ED her labs were remarkable for elevated D-dimer 13,000, bicarb of 35, BUN 58 creatinine 2.88, no leukocytosis, hemoglobin of 8, troponin mildly elevated 43/40, initiated VBG with acidosis 7.28/48 improved after two 7.32/63, BNP elevated at 1444, chest x-ray revealed stable pleural and parenchymal opacities in the right lower hemothorax and mild new pleural effusion on the left but left basilar atelectasis or edema.  Patient received Lasix 80 mg, duplex ultrasound was done was negative for DVT, she was placed on BiPAP 16/8 and was transferred to [facility] for VQ scan.       Past Medical History   Past Medical History:   Diagnosis Date    Acute CHF (CMS/HCC)     04/2023    Arthritis     Atrial fibrillation (CMS/[facility])     Chronic diastolic CHF (congestive heart failure) (CMS/[facility])     COPD (chronic obstructive pulmonary disease) (CMS/[facility])     CVA (cerebral  vascular accident) (CMS/[facility])     - righ sided weakness    Depression     Diabetes (CMS/[facility])     Essential tremor     HTN (hypertension)     Hyperlipidemia     Hypothyroidism     s/p radioactive iodine treatment    Impacted cerumen, left ear     Impacted cerumen of left ear    Left ventricular ejection fraction greater than 40%     40-45%    Noncompliance     Personal history of other diseases of the respiratory system     History of acute bronchitis    Renal carcinoma, right (CMS/[facility])     s/p partial nephrectomy 2021    Vitamin D deficiency       Past Surgical History:   Past Surgical History:   Procedure Laterality Date    ANKLE SURGERY          CARDIAC CATHETERIZATION N/A 2024    Procedure: Right Heart Cath;  Surgeon: Nicholas Antonio MD;  Location: [facility] Cardiac Cath Lab;  Service: Cardiovascular;  Laterality: N/A;    CATARACT EXTRACTION Right     2019     SECTION, CLASSIC      MR CHEST ANGIO W AND WO IV CONTRAST  2023    MR CHEST ANGIO W AND WO IV CONTRAST 2023 [facility] MRI    MR HEAD ANGIO WO IV CONTRAST  2021    MR HEAD ANGIO WO IV CONTRAST [facility]    NEPHRECTOMY  2021    SHOULDER SURGERY      2009     Family History:   Family History   Problem Relation Name Age of Onset    Hypertension Mother      Diabetes Father      Heart disease Father      Cancer Sister      Cancer Brother      Diabetes Daughter      Asthma Daughter      Heart attack Daughter      Diabetes Maternal Grandfather      Heart disease Paternal Grandmother       Social History:   Tobacco Use: High Risk (2024)    Patient History     Smoking Tobacco Use: Every Day     Smokeless Tobacco Use: Never     Passive Exposure: Never      Social History     Substance and Sexual Activity   Alcohol Use Not Currently        ED Course   Vitals - BP 98/79 (BP Location: Right arm, Patient Position: Lying)   Pulse 56   Temp 36.5 °C (97.7 °F) (Temporal)   Resp 20   Wt 95.4 kg (210 lb 5.1 oz)   " SpO2 93%     Labs:   Results from last 72 hours   Lab Units 02/18/24  1621   SODIUM mmol/L 138   POTASSIUM mmol/L 5.2   CHLORIDE mmol/L 98   CO2 mmol/L 35*   BUN mg/dL 58*   CREATININE mg/dL 2.88*   GLUCOSE mg/dL 150*   CALCIUM mg/dL 8.4*   ANION GAP mmol/L 10   EGFR mL/min/1.73m*2 17*      Results from last 72 hours   Lab Units 02/18/24  1621   WBC AUTO x10*3/uL 6.7   HEMOGLOBIN g/dL 8.0*   HEMATOCRIT % 26.6*   PLATELETS AUTO x10*3/uL 234   NEUTROS PCT AUTO % 80.3   LYMPHS PCT AUTO % 7.3   MONOS PCT AUTO % 10.3   EOS PCT AUTO % 0.7      Lab Results   Component Value Date    CALCIUM 8.4 (L) 02/18/2024    PHOS 3.9 02/13/2024      Lab Results   Component Value Date    CRP 8.9 (H) 05/06/2021      [unfilled]     Micro/ID:   No results found for the last 90 days.                   No lab exists for component: \"AGALPCRNB\"   .ID  Lab Results   Component Value Date    URINECULTURE NO SIGNIFICANT GROWTH. 07/19/2023       Imaging:   No orders to display     Encounter Date: 02/01/24   ECG 12 lead   Result Value    Ventricular Rate 52    Atrial Rate 52    IA Interval 188    QRS Duration 120    QT Interval 504    QTC Calculation(Bazett) 468    P Axis 87    R Axis -47    T Axis 93    QRS Count 9    Q Onset 216    P Onset 122    P Offset 144    T Offset 468    QTC Fredericia 480    Narrative    Sinus bradycardia  Left axis deviation  Low voltage QRS  Possible Anterolateral infarct , age undetermined  Abnormal ECG  When compared with ECG of 19-JUL-2023 14:12,  Previous ECG has undetermined rhythm, needs review  Left bundle branch block is no longer Present  Borderline criteria for Anterior infarct are now Present  Borderline criteria for Anterolateral infarct are now Present    See ED provider note for full interpretation and clinical correlation  Confirmed by Wanda Juan ([id number]) on 2/1/2024 10:32:50 AM     Transthoracic Echo (TTE) Complete    Result Date: 2/5/2024   [employer], [id number] [facility], " [place] [address]               Tel [phone] and Fax [phone] TRANSTHORACIC ECHOCARDIOGRAM REPORT  Patient Name:      MOE MAYA      Reading Physician:    [id number] Nicholas Antonio MD Study Date:        2/5/2024             Ordering Provider:    [id number] DOMONIQUE PURCELL MRN/PID:           [id number]             Fellow: Accession#:        [id number]         Nurse: Date of Birth/Age: 1955 / 68 years Sonographer:          Nina Angel                                                               RD Gender:            F                    Additional Staff: Height:            167.64 cm            Admit Date:           2/2/2024 Weight:            102.51 kg            Admission Status:     Inpatient -                                                               Routine BSA:               2.11 m2              Encounter#:           [id number]                                         Department Location:  [facility] Non                                                               Invasive Blood Pressure: 147 /65 mmHg Study Type:    TRANSTHORACIC ECHO (TTE) COMPLETE Diagnosis/ICD: Acute combined systolic (congestive) and diastolic (congestive)                heart failure (CHF)-I50.41 Indication:    Congestive Heart Failure CPT Code:      Echo Complete w Full Doppler-32509 Patient History: Diabetes:         Yes Pertinent         A-Fib, HTN, Dyspnea and LE Edema. Watchman device, elevated History:          BNP,. Study Detail: The following Echo studies were performed: 2D, M-Mode, Doppler and               color flow.  PHYSICIAN INTERPRETATION: Left Ventricle: The left ventricular systolic function is normal, with an estimated ejection fraction of 60-65%. There are no regional wall motion abnormalities. The left ventricular cavity size is normal. Spectral Doppler shows an  impaired relaxation pattern of left ventricular diastolic filling. Left Atrium: The left atrium is moderately dilated. Right Ventricle: The right ventricle is mildly enlarged. There is mildly reduced right ventricular systolic function. Right Atrium: The right atrium is moderately dilated. Aortic Valve: The aortic valve is trileaflet. There is mild aortic valve cusp calcification. There is trivial aortic valve regurgitation. The peak instantaneous gradient of the aortic valve is 14.1 mmHg. The mean gradient of the aortic valve is 9.1 mmHg. Mitral Valve: The mitral valve is normal in structure. There is mild mitral annular calcification. There is mild to moderate mitral valve regurgitation. Tricuspid Valve: The tricuspid valve is structurally normal. There is moderate tricuspid regurgitation. Pulmonic Valve: The pulmonic valve is structurally normal. There is mild pulmonic valve regurgitation. Pericardium: There is a small to moderate pericardial effusion posterior to the left ventricle. There is no evidence of cardiac tamponade. Aorta: The aortic root is normal. Pulmonary Artery: The tricuspid regurgitant velocity is 3.04 m/s, and with an estimated right atrial pressure of 3 mmHg, the estimated pulmonary artery pressure is mildly elevated with the RVSP at 39.9 mmHg. Pulmonary Veins: The pulmonary veins appear normal and return normally to the left atrium. Systemic Veins: The inferior vena cava appears to be of normal size. There is IVC inspiratory collapse greater than 50%.  CONCLUSIONS:  1. Left ventricular systolic function is normal with a 60-65% estimated ejection fraction.  2. Spectral Doppler shows an impaired relaxation pattern of left ventricular diastolic filling.  3. There is mildly reduced right ventricular systolic function.  4. The left atrium is moderately dilated.  5. The right atrium is moderately dilated.  6. There is a small to moderate pericardial effusion.  7. There is no evidence of cardiac  tamponade.  8. Mild to moderate mitral valve regurgitation.  9. Moderate tricuspid regurgitation visualized. 10. Normal CVP. Estimated PASP around 45 mm Hg. QUANTITATIVE DATA SUMMARY: 2D MEASUREMENTS:                           Normal Ranges: IVSd:          1.52 cm    (0.6-1.1cm) LVPWd:         1.42 cm    (0.6-1.1cm) LVIDd:         4.35 cm    (3.9-5.9cm) LVIDs:         2.94 cm LV Mass Index: 120.7 g/m2 LV % FS        32.3 % LA VOLUME:                              Normal Ranges: LA Vol A4C:       79.5 ml    (22+/-6mL/m2) LA Vol A2C:       78.2 ml LA Vol BP:        79.7 ml LA Vol Index A4C: 37.7ml/m2 LA Vol Index A2C: 37.1 ml/m2 LA Vol Index BP:  37.9 ml/m2 LA Volume Index:  37.8 ml/m2 LA Vol A4C:       76.0 ml LA Vol A2C:       73.9 ml RA VOLUME BY A/L METHOD:                       Normal Ranges: RA Area A4C: 17.8 cm2 M-MODE MEASUREMENTS:                  Normal Ranges: Ao Root: 3.00 cm (2.0-3.7cm) LAs:     5.08 cm (2.7-4.0cm) LV SYSTOLIC FUNCTION BY 2D PLANIMETRY (MOD):                     Normal Ranges: EF-A4C View: 63.4 % (>=55%) EF-A2C View: 63.8 % EF-Biplane:  62.5 % LV DIASTOLIC FUNCTION:                             Normal Ranges: MV Peak E:      1.33 m/s    (0.7-1.2 m/s) MV Peak A:      0.53 m/s    (0.42-0.7 m/s) E/A Ratio:      2.52        (1.0-2.2) MV e'           0.06 m/s    (>8.0) MV lateral e'   0.06 m/s MV medial e'    0.06 m/s MV A Dur:       110.73 msec E/e' Ratio:     22.09       (<8.0) PulmV Sys Mike:  53.97 cm/s PulmV Dugan Mike: 77.63 cm/s PulmV S/D Mike:  0.70 MITRAL VALVE:                 Normal Ranges: MV DT: 245 msec (150-240msec) MITRAL INSUFFICIENCY:                         Normal Ranges: MR VTI:     171.38 cm MR Vmax:    489.73 cm/s MR Volume:  20.07 ml MR Flow Rt: 57.36 ml/s MR EROA:    0.12 cm2 AORTIC VALVE:                                    Normal Ranges: AoV Vmax:                1.88 m/s  (<=1.7m/s) AoV Peak P.1 mmHg (<20mmHg) AoV Mean P.1 mmHg   (1.7-11.5mmHg) LVOT Max Mike:            0.97 m/s  (<=1.1m/s) AoV VTI:                 46.22 cm  (18-25cm) LVOT VTI:                24.89 cm LVOT Diameter:           1.96 cm   (1.8-2.4cm) AoV Area, VTI:           1.62 cm2  (2.5-5.5cm2) AoV Area,Vmax:           1.56 cm2  (2.5-4.5cm2) AoV Dimensionless Index: 0.54  RIGHT VENTRICLE: RV Basal 3.80 cm RV Mid   2.80 cm RV Major 8.0 cm TAPSE:   18.0 mm RV s'    0.13 m/s TRICUSPID VALVE/RVSP:                             Normal Ranges: Peak TR Velocity: 3.04 m/s RV Syst Pressure: 39.9 mmHg (< 30mmHg) PULMONIC VALVE:                      Normal Ranges: PV Max Mike: 1.0 m/s  (0.6-0.9m/s) PV Max PG:  3.9 mmHg Pulmonary Veins: PulmV Dugan Mike: 77.63 cm/s PulmV S/D Mike:  0.70 PulmV Sys Mike:  53.97 cm/s  [id number] Nicholas Antonio MD Electronically signed on 2/5/2024 at 5:06:05 PM  ** Final **    Vascular US lower extremity venous duplex bilateral    Result Date: 2/18/2024  Interpreted By:  Washington Peraza, STUDY: [facility] US LOWER EXTREMITY VENOUS DUPLEX BILATERAL  2/18/2024 8:09 pm   INDICATION: 67 y/o   F with  Signs/Symptoms:d dimer > 13,000. LMP:  Unknown.   COMPARISON: None.   ACCESSION NUMBER(S): [id number]   ORDERING CLINICIAN: RAINER BARR   TECHNIQUE: Routine ultrasound of the  bilateral lower extremity was performed with duplex Doppler (color and spectral) evaluation.   Static images were obtained for remote interpretation.   FINDINGS: THIGH VEINS:  The common femoral, femoral, popliteal, proximal medial saphenous, and deep femoral veins are patent and free of thrombus. The veins are normally compressible.  They demonstrate normal phasic flow and augmentation response.   CALF VEINS: Significant soft tissue swelling somewhat limits assessment.       Negative study.  No deep venous thrombosis of the  bilateral lower extremity.  Soft tissue swelling limits assessment   MACRO: None   Signed by: Washington Peraza 2/18/2024 8:41 PM Dictation workstation:   [id number]    XR  chest 1 view    Result Date: 2/18/2024  Interpreted By:  Erlin Lui, STUDY: XR CHEST 1 VIEW;  2/18/2024 4:32 pm   INDICATION: Signs/Symptoms:Dyspnea history of CHF.   COMPARISON: CT scan from 02/01/2024.   ACCESSION NUMBER(S): [id number]   ORDERING CLINICIAN: RAINER BARR   TECHNIQUE: Single AP portable view of the chest was obtained.   FINDINGS: MEDIASTINUM/ LUNGS/ TAMMIE: Cardiomegaly. Pulmonary vasculature and interstitium are prominent and indistinct. Slight new pleural and parenchymal opacities at the lateral left lung base. Persistent pleural and parenchymal opacities in the mid and lower right hemithorax. These are grossly stable. Stable calcification in the aorta.   No pneumothorax. No tracheal deviation. No abnormal hilar fullness or gross mass on either side.   BONES: No lytic or blastic destructive bone lesion.  There is mild-to-moderate disc space narrowing and endplate osteophytosis throughout the thoracic spine.   UPPER ABDOMEN: Grossly intact.       Findings of ongoing CHF.   Grossly stable pleural and parenchymal opacities in the mid and lower right hemithorax. Mild new left pleural effusion with left basilar atelectasis or edema.   MACRO: None   Signed by: Erlin Lui 2/18/2024 4:59 PM Dictation workstation:   [id number]          Interventions:  Medications   oxygen (O2) therapy ( inhalation Rate/Dose Verify 2/19/24 0500)   heparin (porcine) injection 5,000 Units (has no administration in time range)   acetaminophen (Tylenol) tablet 650 mg (has no administration in time range)     Or   acetaminophen (Tylenol) oral liquid 650 mg (has no administration in time range)     Or   acetaminophen (Tylenol) suppository 650 mg (has no administration in time range)   acetaminophen (Tylenol) tablet 650 mg (has no administration in time range)     Or   acetaminophen (Tylenol) oral liquid 650 mg (has no administration in time range)     Or   acetaminophen (Tylenol) suppository 650 mg (has no  "administration in time range)   ondansetron (Zofran) tablet 4 mg (has no administration in time range)     Or   ondansetron (Zofran) injection 4 mg (has no administration in time range)   melatonin tablet 5 mg (5 mg oral Not Given 2/19/24 0615)   polyethylene glycol (Glycolax, Miralax) packet 17 g (has no administration in time range)       Objective Data  Physical Exam  Vitals and nursing note reviewed.   Constitutional:       Appearance: Normal appearance. She is normal weight.   HENT:      Head: Normocephalic and atraumatic.      Right Ear: Tympanic membrane normal.      Nose: Nose normal.      Mouth/Throat:      Mouth: Mucous membranes are moist.   Eyes:      Extraocular Movements: Extraocular movements intact.   Cardiovascular:      Rate and Rhythm: Normal rate. Rhythm irregular.      Pulses: Normal pulses.      Heart sounds: Murmur heard.   Pulmonary:      Breath sounds: Rales present.   Abdominal:      General: Abdomen is flat. Bowel sounds are normal.      Palpations: Abdomen is soft.   Musculoskeletal:         General: Swelling present. Normal range of motion.   Skin:     General: Skin is warm and dry.      Capillary Refill: Capillary refill takes less than 2 seconds.   Neurological:      General: No focal deficit present.      Mental Status: She is alert and oriented to person, place, and time.   Psychiatric:         Mood and Affect: Mood normal.         Behavior: Behavior normal.         Thought Content: Thought content normal.         Judgment: Judgment normal.          Visit Vitals  BP 98/79 (BP Location: Right arm, Patient Position: Lying)   Pulse 56   Temp 36.5 °C (97.7 °F) (Temporal)   Resp 20   Ht 1.676 m (5' 6\")   Wt 95.4 kg (210 lb 5.1 oz)   SpO2 93%   BMI 33.95 kg/m²   Smoking Status Every Day   BSA 2.11 m²        No intake or output data in the 24 hours ending 02/19/24 0600    FiO2 (%):  [40 %-75 %] 75 %     Current Inpatient Medications   Scheduled medications  heparin (porcine), 5,000 Units, " subcutaneous, q8h  melatonin, 5 mg, oral, Daily  polyethylene glycol, 17 g, oral, Daily        Continuous medications       PRN medications  PRN medications: acetaminophen **OR** acetaminophen **OR** acetaminophen, acetaminophen **OR** acetaminophen **OR** acetaminophen, ondansetron **OR** ondansetron, oxygen     Assessment and Plan   Daphne Morris is a 68 y.o. female presenting for acute on chronic hypoxic respiratory failure, acute decompensated heart failure    Acute Medical Issues:  #Acute on chronic hypoxic/hypercapnic respiratory failure requiring noninvasive ventilation  Secondary to flu B infection/decompensated heart failure  - Patient currently requiring BiPAP 16/8  - Baseline oxygen 2 L wean as tolerated  - Diuresis as tolerated by kidney function  - I-S/BH/aspiration precautions  - Tamiflu adjusted to renal dose record of previous rash will administer Benadryl and as needed  - The patient condition did not improve with the current measurement consider pulmonary evaluation      #Acute on chronic diastolic CHF  -S/p RHC showed MEAN PA 30 MILD PULMONARY HTN MEAN RA 9 MM HG MEAN PCWP 23 MM HG CARDIAC OUTPUT 8.03 CI 3.79 NO SHUNTS    -Patient was recently discharged on Lasix 80 mg twice daily, s/p 80 mg of Lasix in the ED  -Strict I's and O's, daily weight, cardiac diet  - Continue home metoprolol  - Cardiology evaluation for medication optimization and failure of diuretics, possible diuresis resistance consider adding metolazone    #elevated D-Dimer  #PE r/o  -Duplex US was negative   -VQ scan     #Lower extremity swelling/redness  With possible underlying cellulitis  - Vancomycin adjusted to renal dose  - Wound care  - Leg elevation    # Elevated troponins-non MI elevation   Cont asa, plavix, statin, metoprolol     Chronic Medical Issues:   #COPD, not in acute exacerbation, continue DuoNeb treatment/albuterol    #Paroxysmal atrial fibrillation with RVR/Hypertension   -s/p Watchman in Aug 2023, Currently  SB  -Cont amiodarone and metoprolol for rate control along with-avoid    # Anemia, Appear stable     #Acute on Chronic kidney disease, improving  -s/p nephrectomy  -Renal US negative    # Diabetes, ISS, Accuchecks    #Hypothyroidism  -Cont synthroid    #Pericardial effusion  -Mild to moderate on echo  -Cont diuresis    Fluids:1500ml  Electrolytes: replete as needed  Nutrition: Cardiology   GI Prophylaxis: Protonix   DVT Prophylaxis:Heparin    Access: PIV    Antibiotics: none  Oxygenation: BIPAP    Dispo: patient admitted for HFpEFae, and elevated D-Dimer will require Cardiology evaluation and VQ scan.

## 2024-02-19 NOTE — CARE PLAN
The patient's goals for the shift include      The clinical goals for the shift include Keep Spo2 92% or greater    Over the shift, the patient did not make progress toward the following goals. Barriers to progression include pt doesn't want bipap on. Recommendations to address these barriers include encourage use of bipap and anxiety meds.

## 2024-02-19 NOTE — PROGRESS NOTES
"Vancomycin Dosing by Pharmacy- INITIAL    Daphne Morris is a 68 y.o. year old female who Pharmacy has been consulted for vancomycin dosing for cellulitis, skin and soft tissue. Based on the patient's indication and renal status this patient will be dosed based on a goal AUC of 400-600.     Renal function is currently declining.    Visit Vitals  BP 98/79 (BP Location: Right arm, Patient Position: Lying)   Pulse 56   Temp 36.5 °C (97.7 °F) (Temporal)   Resp 20        Lab Results   Component Value Date    CREATININE 2.88 (H) 02/18/2024    CREATININE 2.66 (H) 02/15/2024    CREATININE 2.79 (H) 02/13/2024    CREATININE 2.87 (H) 02/13/2024        Patient weight is No results found for: \"PTWEIGHT\"    No results found for: \"CULTURE\"     No intake/output data recorded.  [unfilled]    Lab Results   Component Value Date    PATIENTTEMP  02/19/2024      Comment:      NOTE: Patient Results are Not Corrected for Temperature    PATIENTTEMP  02/18/2024      Comment:      NOTE: Patient Results are Not Corrected for Temperature    PATIENTTEMP  02/18/2024      Comment:      NOTE: Patient Results are Not Corrected for Temperature          Assessment/Plan     Patient will not be given a loading dose.  Will initiate vancomycin maintenance,  750 mg every 24 hours.    This dosing regimen is predicted by [employer] to result in the following pharmacokinetic parameters:  Loading dose: N/A  Regimen: 750 mg IV every 24 hours.  Start time: 07:17 on 02/19/2024  Exposure target: AUC24 (range)400-600 mg/L.hr   AUC24,ss: 498 mg/L.hr  Probability of AUC24 > 400: 75 %  Ctrough,ss: 17.6 mg/L  Probability of Ctrough,ss > 20: 36 %  Probability of nephrotoxicity (Lodise MILLER 2009): 13 %      Follow-up level will be ordered on 2/21 at am labs unless clinically indicated sooner.  Will continue to monitor renal function daily while on vancomycin and order serum creatinine at least every 48 hours if not already ordered.  Follow for continued vancomycin " needs, clinical response, and signs/symptoms of toxicity.       Silver Cruz, PharmD

## 2024-02-19 NOTE — SIGNIFICANT EVENT
Daphne Morris is a 68 y.o. female presenting with past medical history of heart failure, atrial fibrillation, Hypertension, type 2 DM transfer from [facility] ER due to acute hypoxic respiratory failure secondary to pneumonia, COPD exacerbation and acute diastolic heart failure.     Acute hypoxic respiratory failure secondary to acute on chronic diastolic heart failure and influenza B pneumonia  Echocardiogram 2/05/24/revealed LVEF of 60-65%, impaired relaxation, mod dilated atria  Right heart cath February 7, 2024 revealed moderate pulmonary hypertension, mPAP of 30 to 33 mmHg, mRA 9 mmHg, normal cardiac output, moderately elevated PCWP  BNP 1444  Chest x-ray with stable pleural and parenchymal opacities and new left pleural effusion with left basilar atelectasis or edema  Continue BiPAP 22/5m wean O2 as tolerated /  on O2 at 2l/min at baseline  We will start Lasix drip and cardiac diet with 1.5 L fluid restriction  Pulmonology and cardiology consulted  Continue renally dosed Tamiflu  Strict I's and O's and cardiac diet    D-Dimer 13,011  Possibly related to flu  Ultrasound ruled out DVT  VQ scan pending    Lower extremity cellulitis  To diuresis, leg elevation  Continue vancomycin    Elevated troponin  Suspect demand ischemia  EKG with no significant changes from prior EKG  Continue aspirin, Plavix, statin, metoprolol  Cardiology consulted    COPD  Does not appear to be in acute exacerbation  Continue DuoNebs    Stage III/IV chronic kidney disease  Status post right nephrectomy  Renal ultrasound negative  Renal function seems to be near baseline     Paroxysmal atrial fibrillation  Status post watchman in August 2023  Currently in sinus rhythm  Continue amiodarone and metoprolol     Type 2 diabetes mellitus  Continue insulin sliding scale  Holding Lantus due to limited oral intake  Holding Januvia  Continue diabetic diet  Follow Accu-Cheks     Hypertension  Cardiac diet  Neurovascular metoprolol continued  Monitor  vitals     GERD  Continue PPI     Hyperlipidemia  Continue atorvastatin     Depression  Continue venlafaxine     Hypothyroidism  Continue Synthroid    DVT prophylaxis  Heparin

## 2024-02-20 LAB
ABO GROUP (TYPE) IN BLOOD: NORMAL
ALBUMIN SERPL BCP-MCNC: 2.3 G/DL (ref 3.4–5)
ANION GAP BLDV CALCULATED.4IONS-SCNC: 8 MMOL/L (ref 10–25)
ANION GAP SERPL CALC-SCNC: 14 MMOL/L (ref 10–20)
ANTIBODY SCREEN: NORMAL
BASE EXCESS BLDV CALC-SCNC: 4.5 MMOL/L (ref -2–3)
BODY TEMPERATURE: ABNORMAL
BUN SERPL-MCNC: 63 MG/DL (ref 6–23)
CA-I BLDV-SCNC: 1.1 MMOL/L (ref 1.1–1.33)
CALCIUM SERPL-MCNC: 7.5 MG/DL (ref 8.6–10.3)
CHLORIDE BLDV-SCNC: 99 MMOL/L (ref 98–107)
CHLORIDE SERPL-SCNC: 97 MMOL/L (ref 98–107)
CO2 SERPL-SCNC: 30 MMOL/L (ref 21–32)
CREAT SERPL-MCNC: 3.2 MG/DL (ref 0.5–1.05)
EGFRCR SERPLBLD CKD-EPI 2021: 15 ML/MIN/1.73M*2
ERYTHROCYTE [DISTWIDTH] IN BLOOD BY AUTOMATED COUNT: 13.4 % (ref 11.5–14.5)
ERYTHROCYTE [DISTWIDTH] IN BLOOD BY AUTOMATED COUNT: 14.7 % (ref 11.5–14.5)
FERRITIN SERPL-MCNC: 234 NG/ML (ref 8–150)
GLUCOSE BLD MANUAL STRIP-MCNC: 129 MG/DL (ref 74–99)
GLUCOSE BLD MANUAL STRIP-MCNC: 139 MG/DL (ref 74–99)
GLUCOSE BLD MANUAL STRIP-MCNC: 182 MG/DL (ref 74–99)
GLUCOSE BLDV-MCNC: 119 MG/DL (ref 74–99)
GLUCOSE SERPL-MCNC: 63 MG/DL (ref 74–99)
HCO3 BLDV-SCNC: 30.8 MMOL/L (ref 22–26)
HCT VFR BLD AUTO: 22.1 % (ref 36–46)
HCT VFR BLD AUTO: 26.2 % (ref 36–46)
HCT VFR BLD EST: 19 % (ref 36–46)
HEMOCCULT SP1 STL QL: POSITIVE
HGB BLD-MCNC: 6.4 G/DL (ref 12–16)
HGB BLD-MCNC: 7.8 G/DL (ref 12–16)
HGB BLDV-MCNC: 6.4 G/DL (ref 12–16)
INHALED O2 CONCENTRATION: 60 %
IRON SATN MFR SERPL: 11 % (ref 25–45)
IRON SERPL-MCNC: 24 UG/DL (ref 35–150)
LACTATE BLDV-SCNC: 0.6 MMOL/L (ref 0.4–2)
MCH RBC QN AUTO: 29.4 PG (ref 26–34)
MCH RBC QN AUTO: 29.7 PG (ref 26–34)
MCHC RBC AUTO-ENTMCNC: 29 G/DL (ref 32–36)
MCHC RBC AUTO-ENTMCNC: 29.8 G/DL (ref 32–36)
MCV RBC AUTO: 100 FL (ref 80–100)
MCV RBC AUTO: 101 FL (ref 80–100)
NRBC BLD-RTO: 0 /100 WBCS (ref 0–0)
NRBC BLD-RTO: 0 /100 WBCS (ref 0–0)
OXYHGB MFR BLDV: 91.7 % (ref 45–75)
PCO2 BLDV: 57 MM HG (ref 41–51)
PH BLDV: 7.34 PH (ref 7.33–7.43)
PHOSPHATE SERPL-MCNC: 4.8 MG/DL (ref 2.5–4.9)
PLATELET # BLD AUTO: 210 X10*3/UL (ref 150–450)
PLATELET # BLD AUTO: 222 X10*3/UL (ref 150–450)
PO2 BLDV: 62 MM HG (ref 35–45)
POTASSIUM BLDV-SCNC: 4.8 MMOL/L (ref 3.5–5.3)
POTASSIUM SERPL-SCNC: 4.8 MMOL/L (ref 3.5–5.3)
PROCALCITONIN SERPL-MCNC: 0.13 NG/ML
RBC # BLD AUTO: 2.18 X10*6/UL (ref 4–5.2)
RBC # BLD AUTO: 2.63 X10*6/UL (ref 4–5.2)
RH FACTOR (ANTIGEN D): NORMAL
SAO2 % BLDV: 93 % (ref 45–75)
SODIUM BLDV-SCNC: 133 MMOL/L (ref 136–145)
SODIUM SERPL-SCNC: 136 MMOL/L (ref 136–145)
TIBC SERPL-MCNC: 214 UG/DL (ref 240–445)
UIBC SERPL-MCNC: 190 UG/DL (ref 110–370)
WBC # BLD AUTO: 6.2 X10*3/UL (ref 4.4–11.3)
WBC # BLD AUTO: 6.7 X10*3/UL (ref 4.4–11.3)

## 2024-02-20 PROCEDURE — 82270 OCCULT BLOOD FECES: CPT | Performed by: INTERNAL MEDICINE

## 2024-02-20 PROCEDURE — 84100 ASSAY OF PHOSPHORUS: CPT | Performed by: FAMILY MEDICINE

## 2024-02-20 PROCEDURE — 84132 ASSAY OF SERUM POTASSIUM: CPT | Performed by: FAMILY MEDICINE

## 2024-02-20 PROCEDURE — 2500000005 HC RX 250 GENERAL PHARMACY W/O HCPCS

## 2024-02-20 PROCEDURE — 85027 COMPLETE CBC AUTOMATED: CPT | Performed by: INTERNAL MEDICINE

## 2024-02-20 PROCEDURE — 36430 TRANSFUSION BLD/BLD COMPNT: CPT

## 2024-02-20 PROCEDURE — 36415 COLL VENOUS BLD VENIPUNCTURE: CPT | Performed by: FAMILY MEDICINE

## 2024-02-20 PROCEDURE — 82947 ASSAY GLUCOSE BLOOD QUANT: CPT

## 2024-02-20 PROCEDURE — 2500000004 HC RX 250 GENERAL PHARMACY W/ HCPCS (ALT 636 FOR OP/ED)

## 2024-02-20 PROCEDURE — 94660 CPAP INITIATION&MGMT: CPT

## 2024-02-20 PROCEDURE — 2500000005 HC RX 250 GENERAL PHARMACY W/O HCPCS: Performed by: FAMILY MEDICINE

## 2024-02-20 PROCEDURE — 85027 COMPLETE CBC AUTOMATED: CPT | Performed by: FAMILY MEDICINE

## 2024-02-20 PROCEDURE — 86920 COMPATIBILITY TEST SPIN: CPT

## 2024-02-20 PROCEDURE — 2500000001 HC RX 250 WO HCPCS SELF ADMINISTERED DRUGS (ALT 637 FOR MEDICARE OP)

## 2024-02-20 PROCEDURE — 82728 ASSAY OF FERRITIN: CPT | Performed by: INTERNAL MEDICINE

## 2024-02-20 PROCEDURE — 1100000001 HC PRIVATE ROOM DAILY

## 2024-02-20 PROCEDURE — 99231 SBSQ HOSP IP/OBS SF/LOW 25: CPT | Performed by: INTERNAL MEDICINE

## 2024-02-20 PROCEDURE — 2500000001 HC RX 250 WO HCPCS SELF ADMINISTERED DRUGS (ALT 637 FOR MEDICARE OP): Performed by: INTERNAL MEDICINE

## 2024-02-20 PROCEDURE — 94668 MNPJ CHEST WALL SBSQ: CPT

## 2024-02-20 PROCEDURE — 83540 ASSAY OF IRON: CPT | Performed by: INTERNAL MEDICINE

## 2024-02-20 PROCEDURE — 2500000002 HC RX 250 W HCPCS SELF ADMINISTERED DRUGS (ALT 637 FOR MEDICARE OP, ALT 636 FOR OP/ED)

## 2024-02-20 PROCEDURE — 2500000002 HC RX 250 W HCPCS SELF ADMINISTERED DRUGS (ALT 637 FOR MEDICARE OP, ALT 636 FOR OP/ED): Performed by: FAMILY MEDICINE

## 2024-02-20 PROCEDURE — 99233 SBSQ HOSP IP/OBS HIGH 50: CPT | Performed by: INTERNAL MEDICINE

## 2024-02-20 PROCEDURE — P9016 RBC LEUKOCYTES REDUCED: HCPCS

## 2024-02-20 PROCEDURE — 86901 BLOOD TYPING SEROLOGIC RH(D): CPT | Performed by: INTERNAL MEDICINE

## 2024-02-20 PROCEDURE — 94640 AIRWAY INHALATION TREATMENT: CPT

## 2024-02-20 PROCEDURE — 99233 SBSQ HOSP IP/OBS HIGH 50: CPT

## 2024-02-20 PROCEDURE — 94667 MNPJ CHEST WALL 1ST: CPT

## 2024-02-20 RX ORDER — TRAZODONE HYDROCHLORIDE 50 MG/1
50 TABLET ORAL ONCE
Status: COMPLETED | OUTPATIENT
Start: 2024-02-20 | End: 2024-02-20

## 2024-02-20 RX ORDER — LIDOCAINE HYDROCHLORIDE 10 MG/ML
5 INJECTION, SOLUTION EPIDURAL; INFILTRATION; INTRACAUDAL; PERINEURAL ONCE
Status: DISCONTINUED | OUTPATIENT
Start: 2024-02-20 | End: 2024-03-05 | Stop reason: HOSPADM

## 2024-02-20 RX ORDER — PREDNISONE 20 MG/1
40 TABLET ORAL DAILY
Status: DISCONTINUED | OUTPATIENT
Start: 2024-02-20 | End: 2024-03-05 | Stop reason: HOSPADM

## 2024-02-20 RX ADMIN — ASPIRIN 81 MG: 81 TABLET, COATED ORAL at 08:22

## 2024-02-20 RX ADMIN — POLYETHYLENE GLYCOL 3350 17 G: 17 POWDER, FOR SOLUTION ORAL at 08:22

## 2024-02-20 RX ADMIN — AMLODIPINE BESYLATE 5 MG: 5 TABLET ORAL at 08:22

## 2024-02-20 RX ADMIN — AMIODARONE HYDROCHLORIDE 200 MG: 200 TABLET ORAL at 08:22

## 2024-02-20 RX ADMIN — ATORVASTATIN CALCIUM 40 MG: 40 TABLET, FILM COATED ORAL at 08:22

## 2024-02-20 RX ADMIN — HEPARIN SODIUM 5000 UNITS: 5000 INJECTION INTRAVENOUS; SUBCUTANEOUS at 00:38

## 2024-02-20 RX ADMIN — IPRATROPIUM BROMIDE AND ALBUTEROL SULFATE 3 ML: 2.5; .5 SOLUTION RESPIRATORY (INHALATION) at 08:02

## 2024-02-20 RX ADMIN — PANTOPRAZOLE SODIUM 40 MG: 40 TABLET, DELAYED RELEASE ORAL at 08:22

## 2024-02-20 RX ADMIN — Medication 60 PERCENT: at 15:22

## 2024-02-20 RX ADMIN — LEVOTHYROXINE SODIUM 200 MCG: 100 TABLET ORAL at 05:39

## 2024-02-20 RX ADMIN — IPRATROPIUM BROMIDE AND ALBUTEROL SULFATE 3 ML: 2.5; .5 SOLUTION RESPIRATORY (INHALATION) at 19:49

## 2024-02-20 RX ADMIN — VANCOMYCIN HYDROCHLORIDE 750 MG: 750 INJECTION, SOLUTION INTRAVENOUS at 14:36

## 2024-02-20 RX ADMIN — METOPROLOL SUCCINATE 50 MG: 50 TABLET, EXTENDED RELEASE ORAL at 08:22

## 2024-02-20 RX ADMIN — VENLAFAXINE HYDROCHLORIDE 75 MG: 75 CAPSULE, EXTENDED RELEASE ORAL at 08:22

## 2024-02-20 RX ADMIN — PANTOPRAZOLE SODIUM 40 MG: 40 TABLET, DELAYED RELEASE ORAL at 21:06

## 2024-02-20 RX ADMIN — IPRATROPIUM BROMIDE AND ALBUTEROL SULFATE 3 ML: 2.5; .5 SOLUTION RESPIRATORY (INHALATION) at 15:03

## 2024-02-20 RX ADMIN — Medication 5 MG: at 21:06

## 2024-02-20 RX ADMIN — METOPROLOL SUCCINATE 50 MG: 50 TABLET, EXTENDED RELEASE ORAL at 21:06

## 2024-02-20 RX ADMIN — FERROUS GLUCONATE 324 MG: 324 TABLET ORAL at 08:22

## 2024-02-20 RX ADMIN — Medication 10 L/MIN: at 12:02

## 2024-02-20 RX ADMIN — CLOPIDOGREL 75 MG: 75 TABLET ORAL at 08:22

## 2024-02-20 RX ADMIN — TRAZODONE HYDROCHLORIDE 50 MG: 50 TABLET ORAL at 00:38

## 2024-02-20 RX ADMIN — PREDNISONE 40 MG: 20 TABLET ORAL at 13:57

## 2024-02-20 RX ADMIN — DEXTROSE MONOHYDRATE 25 G: 25 INJECTION, SOLUTION INTRAVENOUS at 06:49

## 2024-02-20 RX ADMIN — OSELTAMAVIR PHOSPHATE 30 MG: 30 CAPSULE ORAL at 08:22

## 2024-02-20 RX ADMIN — Medication 60 PERCENT: at 08:02

## 2024-02-20 ASSESSMENT — PAIN SCALES - GENERAL
PAINLEVEL_OUTOF10: 0 - NO PAIN
PAINLEVEL_OUTOF10: 0 - NO PAIN

## 2024-02-20 NOTE — CARE PLAN
The patient's goals for the shift include  get bipap mask off.    The clinical goals for the shift include maintain 02 above 92% throughout shift.

## 2024-02-20 NOTE — PROGRESS NOTES
"Daphne Morris is a 68 y.o. female on day 1 of admission presenting with Heart failure (CMS/HCC).      Subjective   She is sitting up in the bed with bipap on. Still with shortness of breath and edema to all extremities. Currently getting PRBCs.       Review of systems:  Constitutional: negative for fever, chills, or malaise  Neuro: negative for dizziness, headache, numbness, tingling  ENT: Negative for nasal congestion or sore throat  CV: negative for chest pain, palpitations  GI: negative for abd pain, nausea, vomiting or diarrhea  : negative for dysuria, frequency, or urgency  Skin: negative for lesions, wounds, or rash  Musculoskeletal: Negative for myalgia, or arthralgia  Endocrine: Negative for polyuria or polydipsia         Objective   Constitutional: Well developed, awake/alert/oriented x3, no distress, alert and cooperative  Eyes: PERRL, EOMI, clear sclera  ENMT: mucous membranes moist, no apparent injury, no lesions seen  Head/Neck: Neck supple, no apparent injury, thyroid without mass or tenderness, No JVD, trachea midline, no bruits  Respiratory/Thorax: Patent airways, CTAB, normal breath sounds with good chest expansion, thorax symmetric  Cardiovascular: Regular, rate and rhythm, no murmurs, 2+ equal pulses of the extremities, normal S 1and S 2  Gastrointestinal: Nondistended, soft, non-tender, no rebound tenderness or guarding, no masses palpable, no organomegaly, +BS, no bruits  Musculoskeletal: ROM intact, no joint swelling, normal strength  Extremities: generalized edema  Neurological: alert and oriented x3, intact senses, motor, response and reflexes, normal strength  Lymphatic: No significant lymphadenopathy  Psychological: Appropriate mood and behavior  Skin: Warm and dry, no lesions, no rashes      Last Recorded Vitals  /53   Pulse 57   Temp 36.9 °C (98.4 °F) (Temporal)   Resp 24   Ht 1.676 m (5' 6\")   Wt 99.7 kg (219 lb 14.4 oz)   SpO2 96%   BMI 35.49 kg/m²     Intake/Output " last 3 Shifts:  I/O last 3 completed shifts:  In: 1218.4 (12.2 mL/kg) [P.O.:1200; I.V.:18.4 (0.2 mL/kg)]  Out: 750 (7.5 mL/kg) [Urine:750 (0.2 mL/kg/hr)]  Weight: 99.7 kg   I/O this shift:  In: 1118.6 [P.O.:540; I.V.:7.7; Blood:570.8]  Out: 185 [Urine:185]    Relevant Results  Scheduled medications  amiodarone, 200 mg, oral, Daily with breakfast  amLODIPine, 5 mg, oral, Daily  aspirin, 81 mg, oral, Daily  atorvastatin, 40 mg, oral, Daily  clopidogrel, 75 mg, oral, Daily  ferrous gluconate, 324 mg, oral, Daily with breakfast  [Held by provider] furosemide, 80 mg, oral, BID  [Held by provider] heparin (porcine), 5,000 Units, subcutaneous, q8h  [Held by provider] insulin glargine, 14 Units, subcutaneous, Nightly  insulin lispro, 0-15 Units, subcutaneous, TID with meals  ipratropium-albuteroL, 3 mL, nebulization, TID  levothyroxine, 200 mcg, oral, Daily before breakfast  lidocaine, 5 mL, infiltration, Once  melatonin, 5 mg, oral, Daily  metoprolol succinate XL, 50 mg, oral, BID  oseltamivir, 30 mg, oral, Daily  pantoprazole, 40 mg, oral, BID  polyethylene glycol, 17 g, oral, Daily  potassium chloride CR, 20 mEq, oral, Daily  potassium chloride CR, 40 mEq, oral, Daily  predniSONE, 40 mg, oral, Daily  vancomycin, 750 mg, intravenous, q24h  venlafaxine XR, 75 mg, oral, Daily      Continuous medications  furosemide, 10 mg/hr, Last Rate: 10 mg/hr (02/20/24 1438)      PRN medications  PRN medications: acetaminophen **OR** acetaminophen **OR** acetaminophen, acetaminophen **OR** acetaminophen **OR** acetaminophen, albuterol, dextrose 10 % in water (D10W), dextrose, diphenhydrAMINE, glucagon, ipratropium-albuteroL, ondansetron **OR** ondansetron, oxygen, oxygen    Results for orders placed or performed during the hospital encounter of 02/19/24 (from the past 24 hour(s))   Urinalysis with Reflex Microscopic   Result Value Ref Range    Color, Urine Yellow Straw, Yellow    Appearance, Urine Hazy (N) Clear    Specific [place],  Urine 1.016 1.005 - 1.035    pH, Urine 5.0 5.0, 5.5, 6.0, 6.5, 7.0, 7.5, 8.0    Protein, Urine >=500 (3+) (N) NEGATIVE mg/dL    Glucose, Urine 50 (1+) (A) NEGATIVE mg/dL    Blood, Urine NEGATIVE NEGATIVE    Ketones, Urine 5 (TRACE) (A) NEGATIVE mg/dL    Bilirubin, Urine NEGATIVE NEGATIVE    Urobilinogen, Urine <2.0 <2.0 mg/dL    Nitrite, Urine NEGATIVE NEGATIVE    Leukocyte Esterase, Urine NEGATIVE NEGATIVE   Microscopic Only, Urine   Result Value Ref Range    WBC, Urine 1-5 1-5, NONE /HPF    RBC, Urine 1-2 NONE, 1-2, 3-5 /HPF    Squamous Epithelial Cells, Urine 1-9 (SPARSE) Reference range not established. /HPF    Bacteria, Urine 1+ (A) NONE SEEN /HPF    Budding Yeast, Urine PRESENT (A) NONE /HPF   POCT GLUCOSE   Result Value Ref Range    POCT Glucose 89 74 - 99 mg/dL   POCT GLUCOSE   Result Value Ref Range    POCT Glucose 92 74 - 99 mg/dL   Renal Function Panel   Result Value Ref Range    Glucose 63 (L) 74 - 99 mg/dL    Sodium 136 136 - 145 mmol/L    Potassium 4.8 3.5 - 5.3 mmol/L    Chloride 97 (L) 98 - 107 mmol/L    Bicarbonate 30 21 - 32 mmol/L    Anion Gap 14 10 - 20 mmol/L    Urea Nitrogen 63 (H) 6 - 23 mg/dL    Creatinine 3.20 (H) 0.50 - 1.05 mg/dL    eGFR 15 (L) >60 mL/min/1.73m*2    Calcium 7.5 (L) 8.6 - 10.3 mg/dL    Phosphorus 4.8 2.5 - 4.9 mg/dL    Albumin 2.3 (L) 3.4 - 5.0 g/dL   CBC   Result Value Ref Range    WBC 6.2 4.4 - 11.3 x10*3/uL    nRBC 0.0 0.0 - 0.0 /100 WBCs    RBC 2.18 (L) 4.00 - 5.20 x10*6/uL    Hemoglobin 6.4 (LL) 12.0 - 16.0 g/dL    Hematocrit 22.1 (L) 36.0 - 46.0 %     (H) 80 - 100 fL    MCH 29.4 26.0 - 34.0 pg    MCHC 29.0 (L) 32.0 - 36.0 g/dL    RDW 13.4 11.5 - 14.5 %    Platelets 210 150 - 450 x10*3/uL   Iron and TIBC   Result Value Ref Range    Iron 24 (L) 35 - 150 ug/dL    UIBC 190 110 - 370 ug/dL    TIBC 214 (L) 240 - 445 ug/dL    % Saturation 11 (L) 25 - 45 %   Ferritin   Result Value Ref Range    Ferritin 234 (H) 8 - 150 ng/mL   Prepare RBC: 1 Units   Result Value Ref  Range    PRODUCT CODE P5755K00     Unit Number [id number]     Unit ABO B     Unit RH NEG     XM INTEP COMP     Dispense Status IS     Blood Expiration Date February 23, 2024 23:59 EST     PRODUCT BLOOD TYPE 1700     UNIT VOLUME 283    POCT GLUCOSE   Result Value Ref Range    POCT Glucose 129 (H) 74 - 99 mg/dL   Type and screen   Result Value Ref Range    ABO TYPE B     Rh TYPE POS     ANTIBODY SCREEN NEG    BLOOD GAS VENOUS FULL PANEL   Result Value Ref Range    POCT pH, Venous 7.34 7.33 - 7.43 pH    POCT pCO2, Venous 57 (H) 41 - 51 mm Hg    POCT pO2, Venous 62 (H) 35 - 45 mm Hg    POCT SO2, Venous 93 (H) 45 - 75 %    POCT Oxy Hemoglobin, Venous 91.7 (H) 45.0 - 75.0 %    POCT Hematocrit Calculated, Venous 19.0 (L) 36.0 - 46.0 %    POCT Sodium, Venous 133 (L) 136 - 145 mmol/L    POCT Potassium, Venous 4.8 3.5 - 5.3 mmol/L    POCT Chloride, Venous 99 98 - 107 mmol/L    POCT Ionized [place], Venous 1.10 1.10 - 1.33 mmol/L    POCT Glucose, Venous 119 (H) 74 - 99 mg/dL    POCT Lactate, Venous 0.6 0.4 - 2.0 mmol/L    POCT Base Excess, Venous 4.5 (H) -2.0 - 3.0 mmol/L    POCT HCO3 Calculated, Venous 30.8 (H) 22.0 - 26.0 mmol/L    POCT Hemoglobin, Venous 6.4 (LL) 12.0 - 16.0 g/dL    POCT Anion Gap, Venous 8.0 (L) 10.0 - 25.0 mmol/L    Patient Temperature      FiO2 60 %   Occult Blood, Stool    Specimen: Stool   Result Value Ref Range    Occult Blood, Stool X1 Positive (A) Negative       NM Lung perfusion with spect/ct   Final Result   There are no segmental or subsegmental perfusion defects in both   lungs indicating low probability for acute pulmonary embolism.   Moderate-sized right pleural effusion.             The interpretation above is based on modified PISAPED criteria.        This study was analyzed and interpreted at [place], [place].        Signed by: Rolando Dillon 2/19/2024 10:38 AM   Dictation workstation:   [id number]      Consult to Interventional Radiology    (Results Pending)    Bedside Midline Imaging    (Results Pending)       Transthoracic Echo (TTE) Complete    Result Date: 2/5/2024   [facility], [address] [address]               Tel [phone] and Fax [phone] TRANSTHORACIC ECHOCARDIOGRAM REPORT  Patient Name:      MOE MAYA      Reading Physician:    [id number] Nicholas Antonio MD Study Date:        2/5/2024             Ordering Provider:    [id number] DOMONIQUE PURCELL MRN/PID:           [id number]             Fellow: Accession#:        [id number]         Nurse: Date of Birth/Age: 1955 / 68 years Sonographer:          Nina Angel                                                               RDNALLELY Gender:            F                    Additional Staff: Height:            167.64 cm            Admit Date:           2/2/2024 Weight:            102.51 kg            Admission Status:     Inpatient -                                                               Routine BSA:               2.11 m2              Encounter#:           [id number]                                         Department Location:  Critical access hospital Non                                                               Invasive Blood Pressure: 147 /65 mmHg Study Type:    TRANSTHORACIC ECHO (TTE) COMPLETE Diagnosis/ICD: Acute combined systolic (congestive) and diastolic (congestive)                heart failure (CHF)-I50.41 Indication:    Congestive Heart Failure CPT Code:      Echo Complete w Full Doppler-38202 Patient History: Diabetes:         Yes Pertinent         A-Fib, HTN, Dyspnea and LE Edema. Watchman device, elevated History:          BNP,. Study Detail: The following Echo studies were performed: 2D, M-Mode, Doppler and               color flow.  PHYSICIAN INTERPRETATION: Left Ventricle: The left ventricular systolic function is normal, with an  estimated ejection fraction of 60-65%. There are no regional wall motion abnormalities. The left ventricular cavity size is normal. Spectral Doppler shows an impaired relaxation pattern of left ventricular diastolic filling. Left Atrium: The left atrium is moderately dilated. Right Ventricle: The right ventricle is mildly enlarged. There is mildly reduced right ventricular systolic function. Right Atrium: The right atrium is moderately dilated. Aortic Valve: The aortic valve is trileaflet. There is mild aortic valve cusp calcification. There is trivial aortic valve regurgitation. The peak instantaneous gradient of the aortic valve is 14.1 mmHg. The mean gradient of the aortic valve is 9.1 mmHg. Mitral Valve: The mitral valve is normal in structure. There is mild mitral annular calcification. There is mild to moderate mitral valve regurgitation. Tricuspid Valve: The tricuspid valve is structurally normal. There is moderate tricuspid regurgitation. Pulmonic Valve: The pulmonic valve is structurally normal. There is mild pulmonic valve regurgitation. Pericardium: There is a small to moderate pericardial effusion posterior to the left ventricle. There is no evidence of cardiac tamponade. Aorta: The aortic root is normal. Pulmonary Artery: The tricuspid regurgitant velocity is 3.04 m/s, and with an estimated right atrial pressure of 3 mmHg, the estimated pulmonary artery pressure is mildly elevated with the RVSP at 39.9 mmHg. Pulmonary Veins: The pulmonary veins appear normal and return normally to the left atrium. Systemic Veins: The inferior vena cava appears to be of normal size. There is IVC inspiratory collapse greater than 50%.  CONCLUSIONS:  1. Left ventricular systolic function is normal with a 60-65% estimated ejection fraction.  2. Spectral Doppler shows an impaired relaxation pattern of left ventricular diastolic filling.  3. There is mildly reduced right ventricular systolic function.  4. The left atrium is  moderately dilated.  5. The right atrium is moderately dilated.  6. There is a small to moderate pericardial effusion.  7. There is no evidence of cardiac tamponade.  8. Mild to moderate mitral valve regurgitation.  9. Moderate tricuspid regurgitation visualized. 10. Normal CVP. Estimated PASP around 45 mm Hg. QUANTITATIVE DATA SUMMARY: 2D MEASUREMENTS:                           Normal Ranges: IVSd:          1.52 cm    (0.6-1.1cm) LVPWd:         1.42 cm    (0.6-1.1cm) LVIDd:         4.35 cm    (3.9-5.9cm) LVIDs:         2.94 cm LV Mass Index: 120.7 g/m2 LV % FS        32.3 % LA VOLUME:                              Normal Ranges: LA Vol A4C:       79.5 ml    (22+/-6mL/m2) LA Vol A2C:       78.2 ml LA Vol BP:        79.7 ml LA Vol Index A4C: 37.7ml/m2 LA Vol Index A2C: 37.1 ml/m2 LA Vol Index BP:  37.9 ml/m2 LA Volume Index:  37.8 ml/m2 LA Vol A4C:       76.0 ml LA Vol A2C:       73.9 ml RA VOLUME BY A/L METHOD:                       Normal Ranges: RA Area A4C: 17.8 cm2 M-MODE MEASUREMENTS:                  Normal Ranges: Ao Root: 3.00 cm (2.0-3.7cm) LAs:     5.08 cm (2.7-4.0cm) LV SYSTOLIC FUNCTION BY 2D PLANIMETRY (MOD):                     Normal Ranges: EF-A4C View: 63.4 % (>=55%) EF-A2C View: 63.8 % EF-Biplane:  62.5 % LV DIASTOLIC FUNCTION:                             Normal Ranges: MV Peak E:      1.33 m/s    (0.7-1.2 m/s) MV Peak A:      0.53 m/s    (0.42-0.7 m/s) E/A Ratio:      2.52        (1.0-2.2) MV e'           0.06 m/s    (>8.0) MV lateral e'   0.06 m/s MV medial e'    0.06 m/s MV A Dur:       110.73 msec E/e' Ratio:     22.09       (<8.0) PulmV Sys Mike:  53.97 cm/s PulmV Dugan Mike: 77.63 cm/s PulmV S/D Mike:  0.70 MITRAL VALVE:                 Normal Ranges: MV DT: 245 msec (150-240msec) MITRAL INSUFFICIENCY:                         Normal Ranges: MR VTI:     171.38 cm MR Vmax:    489.73 cm/s MR Volume:  20.07 ml MR Flow Rt: 57.36 ml/s MR EROA:    0.12 cm2 AORTIC VALVE:                                     Normal Ranges: AoV Vmax:                1.88 m/s  (<=1.7m/s) AoV Peak P.1 mmHg (<20mmHg) AoV Mean P.1 mmHg  (1.7-11.5mmHg) LVOT Max Mike:            0.97 m/s  (<=1.1m/s) AoV VTI:                 46.22 cm  (18-25cm) LVOT VTI:                24.89 cm LVOT Diameter:           1.96 cm   (1.8-2.4cm) AoV Area, VTI:           1.62 cm2  (2.5-5.5cm2) AoV Area,Vmax:           1.56 cm2  (2.5-4.5cm2) AoV Dimensionless Index: 0.54  RIGHT VENTRICLE: RV Basal 3.80 cm RV Mid   2.80 cm RV Major 8.0 cm TAPSE:   18.0 mm RV s'    0.13 m/s TRICUSPID VALVE/RVSP:                             Normal Ranges: Peak TR Velocity: 3.04 m/s RV Syst Pressure: 39.9 mmHg (< 30mmHg) PULMONIC VALVE:                      Normal Ranges: PV Max Mike: 1.0 m/s  (0.6-0.9m/s) PV Max PG:  3.9 mmHg Pulmonary Veins: PulmV Dugan Mike: 77.63 cm/s PulmV S/D Mike:  0.70 PulmV Sys Mike:  53.97 cm/s  [id number] Nicholas Antnoio MD Electronically signed on 2024 at 5:06:05 PM  ** Final **           Assessment/Plan   Principal Problem:    Heart failure (CMS/HCC)    1. Acute on chronic hypoxic/hypercapnic respiratory failure 2/2 acute on chronic diastolic CHF, influenza, COPD   -Isolation  -Tamiflu  -BNP 1444  -CXR showed CHF, Grossly stable pleural and parenchymal opacities in the mid and lower right hemithorax. Mild new left pleural effusion  -VQ scan showed moderate right pleural effusion  -bronchodilators  -Steroids  -oxygen/bipap support  -Significant leg to abd swelling and shortness of breath  -Strict I & Os  -Daily weights  -2gm na diet  -Echo as above  -Unable to start SGLT2 inhibitors with current GFR  -s/p RHC during last admit showed MEAN PA 30 MILD PULMONARY HTN MEAN RA 9 MM HG MEAN PCWP 23 MM HG CARDIAC OUTPUT 8.03 CI 3.79 NO SHUNTS   -Agree with Lasix gtt but most likely will need dialysis     --Edema appears to be worse despite diuresis  -Monitor on tele     2. Elevated troponins-non MI elevation 2/2 influenza, respiratory  distress  -Denies chest pain  -Does have shortness of breath-worsening for weeks  -Unable to have LHC due to creatinine  -Cont asa, plavix, statin, metoprolol  -Monitor on tele     3. Paroxysmal atrial fibrillation with RVR  -s/p Watchman in Aug 2023  -Currently SB/SR  -Cont amiodarone and metoprolol for rate control  -Monitor on tele     4. Hypertension  -stable  -2gm na diet  -cont meds  -monitor     5. Acute on chronic Anemia  -hgb down to 6.4 today  -Agree with PRBCs  -Monitor CBC  -Cont asa and plavix for now->hold if hgb conts to drop     6. Acute on Chronic kidney disease  -s/p nephrectomy  -Renal US negative  -Had been seen by renal during previous admit  -May need dialysis     7. Diabetes  -ISS  -Accuchecks     8. Hypothyroidism  -Cont synthroid        CARLENE Villegas-CNP

## 2024-02-20 NOTE — PROGRESS NOTES
"Daphne Morris is a 68 y.o. female on day 1 of admission presenting with Heart failure (CMS/HCC).    Subjective   No acute events overnight. Patient having improvement of respiratory and mental status with BiPAP. Able to take off the BiPAP this morning. Currently receiving blood due to Hgb 6.4    Objective     Physical Exam  Vitals reviewed.   Constitutional:       Appearance: She is ill-appearing.      Comments: Improved alertness this morning   Cardiovascular:      Rate and Rhythm: Normal rate and regular rhythm.      Heart sounds: Normal heart sounds. No murmur heard.  Pulmonary:      Comments: Improved breath sounds bilaterally this morning  Abdominal:      General: There is no distension.      Palpations: Abdomen is soft.      Tenderness: There is no abdominal tenderness.   Musculoskeletal:         General: Swelling present. No tenderness.      Right lower leg: Edema present.      Left lower leg: Edema present.   Neurological:      General: No focal deficit present.      Mental Status: She is alert. Mental status is at baseline.         Last Recorded Vitals  Blood pressure 129/59, pulse 59, temperature 36.9 °C (98.4 °F), temperature source Temporal, resp. rate 19, height 1.676 m (5' 6\"), weight 99.7 kg (219 lb 14.4 oz), SpO2 97 %.  Intake/Output last 3 Shifts:  I/O last 3 completed shifts:  In: 1218.4 (12.2 mL/kg) [P.O.:1200; I.V.:18.4 (0.2 mL/kg)]  Out: 750 (7.5 mL/kg) [Urine:750 (0.2 mL/kg/hr)]  Weight: 99.7 kg     Relevant Results  Scheduled medications  amiodarone, 200 mg, oral, Daily with breakfast  amLODIPine, 5 mg, oral, Daily  aspirin, 81 mg, oral, Daily  atorvastatin, 40 mg, oral, Daily  clopidogrel, 75 mg, oral, Daily  ferrous gluconate, 324 mg, oral, Daily with breakfast  [Held by provider] furosemide, 80 mg, oral, BID  [Held by provider] heparin (porcine), 5,000 Units, subcutaneous, q8h  [Held by provider] insulin glargine, 14 Units, subcutaneous, Nightly  insulin lispro, 0-15 Units, subcutaneous, " TID with meals  ipratropium-albuteroL, 3 mL, nebulization, TID  levothyroxine, 200 mcg, oral, Daily before breakfast  lidocaine, 5 mL, infiltration, Once  melatonin, 5 mg, oral, Daily  metoprolol succinate XL, 50 mg, oral, BID  oseltamivir, 30 mg, oral, Daily  pantoprazole, 40 mg, oral, BID  polyethylene glycol, 17 g, oral, Daily  potassium chloride CR, 20 mEq, oral, Daily  potassium chloride CR, 40 mEq, oral, Daily  predniSONE, 40 mg, oral, Daily  vancomycin, 750 mg, intravenous, q24h  venlafaxine XR, 75 mg, oral, Daily      Continuous medications  furosemide, 10 mg/hr, Last Rate: 10 mg/hr (02/20/24 0655)      PRN medications  PRN medications: acetaminophen **OR** acetaminophen **OR** acetaminophen, acetaminophen **OR** acetaminophen **OR** acetaminophen, albuterol, dextrose 10 % in water (D10W), dextrose, diphenhydrAMINE, glucagon, ipratropium-albuteroL, ondansetron **OR** ondansetron, oxygen, oxygen    Results for orders placed or performed during the hospital encounter of 02/19/24 (from the past 24 hour(s))   Urinalysis with Reflex Microscopic   Result Value Ref Range    Color, Urine Yellow Straw, Yellow    Appearance, Urine Hazy (N) Clear    Specific Gravity, Urine 1.016 1.005 - 1.035    pH, Urine 5.0 5.0, 5.5, 6.0, 6.5, 7.0, 7.5, 8.0    Protein, Urine >=500 (3+) (N) NEGATIVE mg/dL    Glucose, Urine 50 (1+) (A) NEGATIVE mg/dL    Blood, Urine NEGATIVE NEGATIVE    Ketones, Urine 5 (TRACE) (A) NEGATIVE mg/dL    Bilirubin, Urine NEGATIVE NEGATIVE    Urobilinogen, Urine <2.0 <2.0 mg/dL    Nitrite, Urine NEGATIVE NEGATIVE    Leukocyte Esterase, Urine NEGATIVE NEGATIVE   Microscopic Only, Urine   Result Value Ref Range    WBC, Urine 1-5 1-5, NONE /HPF    RBC, Urine 1-2 NONE, 1-2, 3-5 /HPF    Squamous Epithelial Cells, Urine 1-9 (SPARSE) Reference range not established. /HPF    Bacteria, Urine 1+ (A) NONE SEEN /HPF    Budding Yeast, Urine PRESENT (A) NONE /HPF   POCT GLUCOSE   Result Value Ref Range    POCT Glucose 89  74 - 99 mg/dL   POCT GLUCOSE   Result Value Ref Range    POCT Glucose 92 74 - 99 mg/dL   Renal Function Panel   Result Value Ref Range    Glucose 63 (L) 74 - 99 mg/dL    Sodium 136 136 - 145 mmol/L    Potassium 4.8 3.5 - 5.3 mmol/L    Chloride 97 (L) 98 - 107 mmol/L    Bicarbonate 30 21 - 32 mmol/L    Anion Gap 14 10 - 20 mmol/L    Urea Nitrogen 63 (H) 6 - 23 mg/dL    Creatinine 3.20 (H) 0.50 - 1.05 mg/dL    eGFR 15 (L) >60 mL/min/1.73m*2    Calcium 7.5 (L) 8.6 - 10.3 mg/dL    Phosphorus 4.8 2.5 - 4.9 mg/dL    Albumin 2.3 (L) 3.4 - 5.0 g/dL   CBC   Result Value Ref Range    WBC 6.2 4.4 - 11.3 x10*3/uL    nRBC 0.0 0.0 - 0.0 /100 WBCs    RBC 2.18 (L) 4.00 - 5.20 x10*6/uL    Hemoglobin 6.4 (LL) 12.0 - 16.0 g/dL    Hematocrit 22.1 (L) 36.0 - 46.0 %     (H) 80 - 100 fL    MCH 29.4 26.0 - 34.0 pg    MCHC 29.0 (L) 32.0 - 36.0 g/dL    RDW 13.4 11.5 - 14.5 %    Platelets 210 150 - 450 x10*3/uL   Iron and TIBC   Result Value Ref Range    Iron 24 (L) 35 - 150 ug/dL    UIBC 190 110 - 370 ug/dL    TIBC 214 (L) 240 - 445 ug/dL    % Saturation 11 (L) 25 - 45 %   Ferritin   Result Value Ref Range    Ferritin 234 (H) 8 - 150 ng/mL   Prepare RBC: 1 Units   Result Value Ref Range    PRODUCT CODE [id number]     Unit Number [id number]     Unit ABO B     Unit RH NEG     XM INTEP COMP     Dispense Status IS     Blood Expiration Date February 23, 2024 23:59 EST     PRODUCT BLOOD TYPE 1700     UNIT VOLUME 283    POCT GLUCOSE   Result Value Ref Range    POCT Glucose 129 (H) 74 - 99 mg/dL   Type and screen   Result Value Ref Range    ABO TYPE B     Rh TYPE POS     ANTIBODY SCREEN NEG    BLOOD GAS VENOUS FULL PANEL   Result Value Ref Range    POCT pH, Venous 7.34 7.33 - 7.43 pH    POCT pCO2, Venous 57 (H) 41 - 51 mm Hg    POCT pO2, Venous 62 (H) 35 - 45 mm Hg    POCT SO2, Venous 93 (H) 45 - 75 %    POCT Oxy Hemoglobin, Venous 91.7 (H) 45.0 - 75.0 %    POCT Hematocrit Calculated, Venous 19.0 (L) 36.0 - 46.0 %    POCT Sodium, Venous  133 (L) 136 - 145 mmol/L    POCT Potassium, Venous 4.8 3.5 - 5.3 mmol/L    POCT Chloride, Venous 99 98 - 107 mmol/L    POCT Ionized Calicum, Venous 1.10 1.10 - 1.33 mmol/L    POCT Glucose, Venous 119 (H) 74 - 99 mg/dL    POCT Lactate, Venous 0.6 0.4 - 2.0 mmol/L    POCT Base Excess, Venous 4.5 (H) -2.0 - 3.0 mmol/L    POCT HCO3 Calculated, Venous 30.8 (H) 22.0 - 26.0 mmol/L    POCT Hemoglobin, Venous 6.4 (LL) 12.0 - 16.0 g/dL    POCT Anion Gap, Venous 8.0 (L) 10.0 - 25.0 mmol/L    Patient Temperature      FiO2 60 %   Occult Blood, Stool    Specimen: Stool   Result Value Ref Range    Occult Blood, Stool X1 Positive (A) Negative       Assessment/Plan   #Acute on chronic hypoxic hypercapnic respiratory failure 2/2 HFpEF exacerbation, COPD exacerbation  #Respiratory acidosis requiring BiPAP  #Influenza B positive  #Rule out pneumonia  - Continue prednisone 40 mg daily  - Scheduled DuoNebs  - Procalcitonin 0.13  - Continue Tamiflu  - Diuresis as tolerated per primary/cardiology. If symptoms do not resolve with diuresis, will need repeat evaluation and potential thoracentesis  - Able to have BiPAP removed today due to improvement of acidosis on ABG this morning. Will utilize it for tonight prior to sleep    Hailey Sue MD

## 2024-02-20 NOTE — PROGRESS NOTES
Daphne Morris is a 68 y.o. female on day 1 of admission presenting with Heart failure (CMS/HCC).      Subjective   Seen and examined, no new complaints.  No overnight events.  The plan discussed with the patient and RN.       Objective     Last Recorded Vitals  /63 (BP Location: Right arm, Patient Position: Lying)   Pulse 58   Temp 36.5 °C (97.7 °F) (Temporal)   Resp 18   Wt 99.7 kg (219 lb 14.4 oz)   SpO2 97%   Intake/Output last 3 Shifts:    Intake/Output Summary (Last 24 hours) at 2/20/2024 0840  Last data filed at 2/20/2024 0655  Gross per 24 hour   Intake 1218.43 ml   Output 750 ml   Net 468.43 ml       Admission Weight  Weight: 95.4 kg (210 lb 5.1 oz) (02/19/24 0438)    Daily Weight  02/20/24 : 99.7 kg (219 lb 14.4 oz)    Image Results  NM Lung perfusion with spect/ct  Narrative: Interpreted By:  Rolando Dillon and Osman Sena   STUDY:  NM LUNG PERFUSION WITH SPECT/CT;  2/19/2024 9:58 am      INDICATION:  Signs/Symptoms: 68-year-old female presented with known congestive  heart failure. R/o pulmoanry embolism.      COMPARISON:  Chest x-ray from 02/18/2024      ACCESSION NUMBER(S):  [id number]      ORDERING CLINICIAN:  SARAH LEMOS      TECHNIQUE:  DIVISION OF NUCLEAR MEDICINE  PERFUSION LUNG SCANS      Multiple perfusion images of the lungs were acquired after the  intravenous administration of 4.0 mCi of Tc-99m macroaggregated  albumin (MAA). In addition,SPECT/CT of the chest was performed.      FINDINGS:  Perfusion images of both lungs demonstrate mild heterogeneity  throughout the lung fields bilaterally. There are no distinct  segmental or subsegmental perfusion defects. Fissure sign seen at  right lung compatible with right pleural effusion.      Impression: There are no segmental or subsegmental perfusion defects in both  lungs indicating low probability for acute pulmonary embolism.  Moderate-sized right pleural effusion.          The interpretation above is based on modified MARIO  criteria.      This study was analyzed and interpreted at [place], [place].      Signed by: Rolando Dillon 2/19/2024 10:38 AM  Dictation workstation:   [id number]  ECG 12 Lead  Junctional rhythm  Left axis deviation  Inferior infarct , age undetermined  Anterolateral infarct (cited on or before 01-FEB-2024)  Abnormal ECG  When compared with ECG of 01-FEB-2024 02:46,  Junctional rhythm has replaced Sinus rhythm  Questionable change in initial forces of Lateral leads      Physical Exam  Constitutional: Well developed, awake/alert/oriented x3, no distress, alert and cooperative  Eyes: PERRL, EOMI, clear sclera  ENMT: mucous membranes moist, no apparent injury, no lesions seen  Head/Neck: Neck supple, no apparent injury, thyroid without mass or tenderness, No JVD, trachea midline, no bruits  Respiratory/Thorax: Patent airways, CTAB, normal breath sounds with good chest expansion, thorax symmetric  Cardiovascular: Regular, rate and rhythm, no murmurs, 2+ equal pulses of the extremities, normal S 1and S 2  Gastrointestinal: Nondistended, soft, non-tender, no rebound tenderness or guarding, no masses palpable, no organomegaly, +BS, no bruits  Musculoskeletal: ROM intact, no joint swelling, normal strength  Extremities: normal extremities, no cyanosis edema, contusions or wounds, no clubbing  Neurological: alert and oriented x3, intact senses, motor, response and reflexes, normal strength  Lymphatic: No significant lymphadenopathy  Psychological: Appropriate mood and behavior  Skin: Warm and dry, no lesions, no rashes  Relevant Results      Scheduled medications  amiodarone, 200 mg, oral, Daily with breakfast  amLODIPine, 5 mg, oral, Daily  aspirin, 81 mg, oral, Daily  atorvastatin, 40 mg, oral, Daily  clopidogrel, 75 mg, oral, Daily  ferrous gluconate, 324 mg, oral, Daily with breakfast  [Held by provider] furosemide, 80 mg, oral, BID  [Held by provider] heparin (porcine), 5,000 Units, subcutaneous,  q8h  [Held by provider] insulin glargine, 14 Units, subcutaneous, Nightly  insulin lispro, 0-15 Units, subcutaneous, TID with meals  ipratropium-albuteroL, 3 mL, nebulization, TID  levothyroxine, 200 mcg, oral, Daily before breakfast  melatonin, 5 mg, oral, Daily  metoprolol succinate XL, 50 mg, oral, BID  oseltamivir, 30 mg, oral, Daily  pantoprazole, 40 mg, oral, BID  polyethylene glycol, 17 g, oral, Daily  potassium chloride CR, 20 mEq, oral, Daily  potassium chloride CR, 40 mEq, oral, Daily  vancomycin, 750 mg, intravenous, q24h  venlafaxine XR, 75 mg, oral, Daily      Continuous medications  furosemide, 10 mg/hr, Last Rate: 10 mg/hr (02/20/24 0655)      PRN medications  PRN medications: acetaminophen **OR** acetaminophen **OR** acetaminophen, acetaminophen **OR** acetaminophen **OR** acetaminophen, albuterol, dextrose 10 % in water (D10W), dextrose, diphenhydrAMINE, glucagon, ipratropium-albuteroL, ondansetron **OR** ondansetron, oxygen, oxygen          Results for orders placed or performed during the hospital encounter of 02/19/24 (from the past 24 hour(s))   Blood Gas Arterial Full Panel   Result Value Ref Range    POCT pH, Arterial 7.29 (L) 7.38 - 7.42 pH    POCT pCO2, Arterial 68 (H) 38 - 42 mm Hg    POCT pO2, Arterial 69 (L) 85 - 95 mm Hg    POCT SO2, Arterial 96 94 - 100 %    POCT Oxy Hemoglobin, Arterial 94.2 94.0 - 98.0 %    POCT Hematocrit Calculated, Arterial 21.0 (L) 36.0 - 46.0 %    POCT Sodium, Arterial 137 136 - 145 mmol/L    POCT Potassium, Arterial 5.3 3.5 - 5.3 mmol/L    POCT Chloride, Arterial 102 98 - 107 mmol/L    POCT Ionized Calcium, Arterial 1.19 1.10 - 1.33 mmol/L    POCT Glucose, Arterial 86 74 - 99 mg/dL    POCT Lactate, Arterial 0.4 0.4 - 2.0 mmol/L    POCT Base Excess, Arterial 5.3 (H) -2.0 - 3.0 mmol/L    POCT HCO3 Calculated, Arterial 32.7 (H) 22.0 - 26.0 mmol/L    POCT Hemoglobin, Arterial 7.1 (L) 12.0 - 16.0 g/dL    POCT Anion Gap, Arterial 8 (L) 10 - 25 mmo/L    Patient  Temperature      FiO2 60 %   POCT GLUCOSE   Result Value Ref Range    POCT Glucose 88 74 - 99 mg/dL   Blood Gas Arterial Full Panel   Result Value Ref Range    POCT pH, Arterial 7.29 (L) 7.38 - 7.42 pH    POCT pCO2, Arterial 67 (H) 38 - 42 mm Hg    POCT pO2, Arterial 74 (L) 85 - 95 mm Hg    POCT SO2, Arterial 96 94 - 100 %    POCT Oxy Hemoglobin, Arterial 94.3 94.0 - 98.0 %    POCT Hematocrit Calculated, Arterial 22.0 (L) 36.0 - 46.0 %    POCT Sodium, Arterial 135 (L) 136 - 145 mmol/L    POCT Potassium, Arterial 5.4 (H) 3.5 - 5.3 mmol/L    POCT Chloride, Arterial 102 98 - 107 mmol/L    POCT Ionized Calcium, Arterial 1.17 1.10 - 1.33 mmol/L    POCT Glucose, Arterial 86 74 - 99 mg/dL    POCT Lactate, Arterial 0.4 0.4 - 2.0 mmol/L    POCT Base Excess, Arterial 4.8 (H) -2.0 - 3.0 mmol/L    POCT HCO3 Calculated, Arterial 32.2 (H) 22.0 - 26.0 mmol/L    POCT Hemoglobin, Arterial 7.4 (L) 12.0 - 16.0 g/dL    POCT Anion Gap, Arterial 6 (L) 10 - 25 mmo/L    Patient Temperature      FiO2 60 %   Blood Gas Arterial Full Panel   Result Value Ref Range    POCT pH, Arterial 7.31 (L) 7.38 - 7.42 pH    POCT pCO2, Arterial 62 (H) 38 - 42 mm Hg    POCT pO2, Arterial 95 85 - 95 mm Hg    POCT SO2, Arterial 99 94 - 100 %    POCT Oxy Hemoglobin, Arterial 97.1 94.0 - 98.0 %    POCT Hematocrit Calculated, Arterial 24.0 (L) 36.0 - 46.0 %    POCT Sodium, Arterial 135 (L) 136 - 145 mmol/L    POCT Potassium, Arterial 5.4 (H) 3.5 - 5.3 mmol/L    POCT Chloride, Arterial 101 98 - 107 mmol/L    POCT Ionized Calcium, Arterial 1.16 1.10 - 1.33 mmol/L    POCT Glucose, Arterial 81 74 - 99 mg/dL    POCT Lactate, Arterial 0.5 0.4 - 2.0 mmol/L    POCT Base Excess, Arterial 4.2 (H) -2.0 - 3.0 mmol/L    POCT HCO3 Calculated, Arterial 31.2 (H) 22.0 - 26.0 mmol/L    POCT Hemoglobin, Arterial 7.9 (L) 12.0 - 16.0 g/dL    POCT Anion Gap, Arterial 8 (L) 10 - 25 mmo/L    Patient Temperature      FiO2 60 %   Urinalysis with Reflex Microscopic   Result Value Ref Range     Color, Urine Yellow Straw, Yellow    Appearance, Urine Hazy (N) Clear    Specific Gravity, Urine 1.016 1.005 - 1.035    pH, Urine 5.0 5.0, 5.5, 6.0, 6.5, 7.0, 7.5, 8.0    Protein, Urine >=500 (3+) (N) NEGATIVE mg/dL    Glucose, Urine 50 (1+) (A) NEGATIVE mg/dL    Blood, Urine NEGATIVE NEGATIVE    Ketones, Urine 5 (TRACE) (A) NEGATIVE mg/dL    Bilirubin, Urine NEGATIVE NEGATIVE    Urobilinogen, Urine <2.0 <2.0 mg/dL    Nitrite, Urine NEGATIVE NEGATIVE    Leukocyte Esterase, Urine NEGATIVE NEGATIVE   Microscopic Only, Urine   Result Value Ref Range    WBC, Urine 1-5 1-5, NONE /HPF    RBC, Urine 1-2 NONE, 1-2, 3-5 /HPF    Squamous Epithelial Cells, Urine 1-9 (SPARSE) Reference range not established. /HPF    Bacteria, Urine 1+ (A) NONE SEEN /HPF    Budding Yeast, Urine PRESENT (A) NONE /HPF   POCT GLUCOSE   Result Value Ref Range    POCT Glucose 89 74 - 99 mg/dL   POCT GLUCOSE   Result Value Ref Range    POCT Glucose 92 74 - 99 mg/dL   Renal Function Panel   Result Value Ref Range    Glucose 63 (L) 74 - 99 mg/dL    Sodium 136 136 - 145 mmol/L    Potassium 4.8 3.5 - 5.3 mmol/L    Chloride 97 (L) 98 - 107 mmol/L    Bicarbonate 30 21 - 32 mmol/L    Anion Gap 14 10 - 20 mmol/L    Urea Nitrogen 63 (H) 6 - 23 mg/dL    Creatinine 3.20 (H) 0.50 - 1.05 mg/dL    eGFR 15 (L) >60 mL/min/1.73m*2    Calcium 7.5 (L) 8.6 - 10.3 mg/dL    Phosphorus 4.8 2.5 - 4.9 mg/dL    Albumin 2.3 (L) 3.4 - 5.0 g/dL   CBC   Result Value Ref Range    WBC 6.2 4.4 - 11.3 x10*3/uL    nRBC 0.0 0.0 - 0.0 /100 WBCs    RBC 2.18 (L) 4.00 - 5.20 x10*6/uL    Hemoglobin 6.4 (LL) 12.0 - 16.0 g/dL    Hematocrit 22.1 (L) 36.0 - 46.0 %     (H) 80 - 100 fL    MCH 29.4 26.0 - 34.0 pg    MCHC 29.0 (L) 32.0 - 36.0 g/dL    RDW 13.4 11.5 - 14.5 %    Platelets 210 150 - 450 x10*3/uL   Prepare RBC: 1 Units   Result Value Ref Range    PRODUCT CODE D6235L96     Unit Number [id number]     Unit ABO B     Unit RH NEG     XM INTEP COMP     Dispense Status XM      Blood Expiration Date February 23, 2024 23:59 EST     PRODUCT BLOOD TYPE 1700     UNIT VOLUME 283    POCT GLUCOSE   Result Value Ref Range    POCT Glucose 129 (H) 74 - 99 mg/dL   Type and screen   Result Value Ref Range    ABO TYPE B     Rh TYPE POS     ANTIBODY SCREEN NEG    BLOOD GAS VENOUS FULL PANEL   Result Value Ref Range    POCT pH, Venous 7.34 7.33 - 7.43 pH    POCT pCO2, Venous 57 (H) 41 - 51 mm Hg    POCT pO2, Venous 62 (H) 35 - 45 mm Hg    POCT SO2, Venous 93 (H) 45 - 75 %    POCT Oxy Hemoglobin, Venous 91.7 (H) 45.0 - 75.0 %    POCT Hematocrit Calculated, Venous 19.0 (L) 36.0 - 46.0 %    POCT Sodium, Venous 133 (L) 136 - 145 mmol/L    POCT Potassium, Venous 4.8 3.5 - 5.3 mmol/L    POCT Chloride, Venous 99 98 - 107 mmol/L    POCT Ionized Calicum, Venous 1.10 1.10 - 1.33 mmol/L    POCT Glucose, Venous 119 (H) 74 - 99 mg/dL    POCT Lactate, Venous 0.6 0.4 - 2.0 mmol/L    POCT Base Excess, Venous 4.5 (H) -2.0 - 3.0 mmol/L    POCT HCO3 Calculated, Venous 30.8 (H) 22.0 - 26.0 mmol/L    POCT Hemoglobin, Venous 6.4 (LL) 12.0 - 16.0 g/dL    POCT Anion Gap, Venous 8.0 (L) 10.0 - 25.0 mmol/L    Patient Temperature      FiO2 60 %      NM Lung perfusion with spect/ct    Result Date: 2/19/2024  Interpreted By:  Rolando Dillon and Osman Sena STUDY: NM LUNG PERFUSION WITH SPECT/CT;  2/19/2024 9:58 am   INDICATION: Signs/Symptoms: 68-year-old female presented with known congestive heart failure. R/o pulmoanry embolism.   COMPARISON: Chest x-ray from 02/18/2024   ACCESSION NUMBER(S): [id number]   ORDERING CLINICIAN: SARAH LEMOS   TECHNIQUE: DIVISION OF NUCLEAR MEDICINE PERFUSION LUNG SCANS   Multiple perfusion images of the lungs were acquired after the intravenous administration of 4.0 mCi of Tc-99m macroaggregated albumin (MAA). In addition,SPECT/CT of the chest was performed.   FINDINGS: Perfusion images of both lungs demonstrate mild heterogeneity throughout the lung fields bilaterally. There are no distinct  segmental or subsegmental perfusion defects. Fissure sign seen at right lung compatible with right pleural effusion.       There are no segmental or subsegmental perfusion defects in both lungs indicating low probability for acute pulmonary embolism. Moderate-sized right pleural effusion.     The interpretation above is based on modified PISAPED criteria.   This study was analyzed and interpreted at [place], [place].   Signed by: Rolando Dillon 2/19/2024 10:38 AM Dictation workstation:   [id number]    ECG 12 Lead    Result Date: 2/19/2024  Junctional rhythm Left axis deviation Inferior infarct , age undetermined Anterolateral infarct (cited on or before 01-FEB-2024) Abnormal ECG When compared with ECG of 01-FEB-2024 02:46, Junctional rhythm has replaced Sinus rhythm Questionable change in initial forces of Lateral leads    Vascular US lower extremity venous duplex bilateral    Result Date: 2/18/2024  Interpreted By:  Washington Peraza, STUDY: [facility] US LOWER EXTREMITY VENOUS DUPLEX BILATERAL  2/18/2024 8:09 pm   INDICATION: 69 y/o   F with  Signs/Symptoms:d dimer > 13,000. LMP:  Unknown.   COMPARISON: None.   ACCESSION NUMBER(S): [id number]   ORDERING CLINICIAN: RAINER BARR   TECHNIQUE: Routine ultrasound of the  bilateral lower extremity was performed with duplex Doppler (color and spectral) evaluation.   Static images were obtained for remote interpretation.   FINDINGS: THIGH VEINS:  The common femoral, femoral, popliteal, proximal medial saphenous, and deep femoral veins are patent and free of thrombus. The veins are normally compressible.  They demonstrate normal phasic flow and augmentation response.   CALF VEINS: Significant soft tissue swelling somewhat limits assessment.       Negative study.  No deep venous thrombosis of the  bilateral lower extremity.  Soft tissue swelling limits assessment   MACRO: None   Signed by: Washington Peraza 2/18/2024 8:41 PM Dictation workstation:    [id number]    XR chest 1 view    Result Date: 2/18/2024  Interpreted By:  Erlin Lui, STUDY: XR CHEST 1 VIEW;  2/18/2024 4:32 pm   INDICATION: Signs/Symptoms:Dyspnea history of CHF.   COMPARISON: CT scan from 02/01/2024.   ACCESSION NUMBER(S): [id number]   ORDERING CLINICIAN: RAINER BARR   TECHNIQUE: Single AP portable view of the chest was obtained.   FINDINGS: MEDIASTINUM/ LUNGS/ TAMMIE: Cardiomegaly. Pulmonary vasculature and interstitium are prominent and indistinct. Slight new pleural and parenchymal opacities at the lateral left lung base. Persistent pleural and parenchymal opacities in the mid and lower right hemithorax. These are grossly stable. Stable calcification in the aorta.   No pneumothorax. No tracheal deviation. No abnormal hilar fullness or gross mass on either side.   BONES: No lytic or blastic destructive bone lesion.  There is mild-to-moderate disc space narrowing and endplate osteophytosis throughout the thoracic spine.   UPPER ABDOMEN: Grossly intact.       Findings of ongoing CHF.   Grossly stable pleural and parenchymal opacities in the mid and lower right hemithorax. Mild new left pleural effusion with left basilar atelectasis or edema.   MACRO: None   Signed by: Erlin Lui 2/18/2024 4:59 PM Dictation workstation:   [id number]    XR chest 2 views    Result Date: 2/14/2024  Interpreted By:  Amando Starks, STUDY: XR CHEST 2 VIEWS; 2/13/2024 8:25 am   INDICATION: Signs/Symptoms:pleural effusion   COMPARISON: Radiographs 02/09/2024   ACCESSION NUMBER(S): [id number]   ORDERING CLINICIAN: TONIA MONTANA   TECHNIQUE: Frontal and lateral radiographs of the chest were obtained.   FINDINGS: LINES AND DEVICES: None.   LUNGS: Stable moderate to large right pleural effusion with adjacent atelectasis. No new focal consolidation, left pleural effusion or pneumothorax.   CARDIOMEDIASTINAL SILHOUETTE: Partially obscured right heart border by adjacent effusion.   OTHER: No acute osseous  abnormality.       Stable moderate to large right pleural effusion.   MACRO None   Signed by: Amando Starks 2/14/2024 9:50 AM Dictation workstation:   [id number]    XR chest 1 view    Result Date: 2/9/2024  Interpreted By:  Sukumar Daugherty, STUDY: XR CHEST 1 VIEW;  2/9/2024 1:13 pm   INDICATION: Signs/Symptoms:fever.   COMPARISON: 02/02/2024   ACCESSION NUMBER(S): [id number]   ORDERING CLINICIAN: TONIA MONTANA   FINDINGS: Significantly increased large right pleural effusion with overlying atelectasis. Left lung grossly clear. Cardiomediastinal silhouette unchanged. Aortic atherosclerosis. Pulmonary vascular congestion.       Large right pleural effusion, significantly increased compared to 1 week ago.     Signed by: Sukumar Daugherty 2/9/2024 3:56 PM Dictation workstation:   [id number]    Cardiac catheterization - non-coronary    Result Date: 2/7/2024   [facility], Cath Lab, [address] [place] [address] Cardiovascular Catheterization Report Patient Name:      MOE MAYA      Performing Physician:  [id number] Nicholas Antonio MD Study Date:        2/7/2024             Verifying Physician:   [id number] Nicholas Antonio MD MRN/PID:           [id number]             Cardiologist/Co-scrub: Accession#:        [id number]         Ordering Physician:    [id number] DOMONIQUE PURCELL Date of Birth/Age: 1955 / 68 years Fellow: Gender:            F                    Fellow: Encounter#:        [id number]  Study: Right Heart Cath  Indications: MOE MAYA is a 69 year old female who presents with dyslipidemia, hypertension, renal failure, atrial fibrillation, diabetes, peripheral artery disease, chronic pulmonary disease, obesity and Tobacco Use - Current. Heart failure. Pulmonary hypertension and heart failure.   Procedure Description: After infiltration of local anesthetic, the right internal jugular vein was identified with two-dimensional ultrasound. Under direct ultrasound visualization, the right internal jugular vein was cannulated with a micropuncture technique. A 7 Latvian sheath was placed in the vein. A balloon tipped catheter was advanced through the right heart to record pressures. Cardiac output was calculated via the Steph method.  Right Heart Catheterization: A balloon tipped catheter was advanced through the right heart to record pressures. Cardiac output was calculated via the Steph method. Elevated left sided filling pressures with normal cardiac output. Cardiac output is normal. Preserved cardiac output at rest. No evidence of shunt. Elevated pulmonary vascular resistance. Elevated systemic vascular resistance. Pulmonary venous hypertension and pulmonary arterial hypertension.  Hemo Personnel: +-------------------------+---------+ Name                     Duty      +-------------------------+---------+ Nicholas Antonio MDPRONA MD 1 +-------------------------+---------+  +----------+ Contrast:  +----------+ Omnipaque: +----------+  Hemodynamic Pressures:  +----+---------+----------+------------+-------------+---+-----+-------+-------+ SiteDate TimePhase Name  Systolic    Diastolic  ED Mean A-Wave V-Wave                             mmHg        mmHg     mmHmmHg  mmHg   mmHg                                                    g                     +----+---------+----------+------------+-------------+---+-----+-------+-------+   RA 2/7/2024  AIR REST                                9     13     11       2:16:48                                                                     PM                                                          +----+---------+----------+------------+-------------+---+-----+-------+-------+   RV 2/7/2024  AIR  REST          56            3 14                          2:17:18                                                                     PM                                                          +----+---------+----------+------------+-------------+---+-----+-------+-------+   PA 2/7/2024  AIR REST          55           15      30                     2:21:53                                                                     PM                                                          +----+---------+----------+------------+-------------+---+-----+-------+-------+   PW 2/7/2024  AIR REST                               23     20     35       2:23:01                                                                     PM                                                          +----+---------+----------+------------+-------------+---+-----+-------+-------+   PW 2/7/2024  AIR REST                               23     20     38       2:23:28                                                                     PM                                                          +----+---------+----------+------------+-------------+---+-----+-------+-------+   PA 2/7/2024  AIR REST          57           15      33                     2:24:12                                                                     PM                                                          +----+---------+----------+------------+-------------+---+-----+-------+-------+   AO 2/7/2024  AIR REST         176           62      -2                     2:28:01                                                                     PM                                                          +----+---------+----------+------------+-------------+---+-----+-------+-------+  Oxygen Saturation %:  +-----------+----------+------------+ Sample SiteO2 Sat (%)HB (g/100ml) +-----------+----------+------------+          FA        94         8.6 +-----------+----------+------------+          RA        64         8.6 +-----------+----------+------------+          FA        94         8.6 +-----------+----------+------------+          PA        60         8.6 +-----------+----------+------------+  Cardiac Outputs: +---------------+------------------+-------+ ISABELLE CO (l/min)ISABELLE CI (l/min/m2)ISABELLE SV +---------------+------------------+-------+             8.0               3.8  138.5 +---------------+------------------+-------+  Vascular Resistance Calculated Values (Wood Units): +-----+---+----+---+----+ PhasePVRPVRITPRTPRI +-----+---+----+---+----+ 0    1.02.1 4.29.0  +-----+---+----+---+----+  Complications: No in-lab complications observed.  Cardiac Cath Post Procedure Notes: Post Procedure           Moderately elevated PCWP, normal cardiac output, no Diagnosis:               shunts. Blood Loss:              Estimated blood loss during the procedure was 0 mls. Specimens Removed:       Number of specimen(s) removed: none.  Recommendations: Maximize medical therapy. Agressive risk factor modification efforts. Optimize volume status with diuretics. Renal replacement therapy. Patient counseled and advised to discontinue smoking. ____________________________________________________________________________________ CONCLUSIONS:  1. RHC shows moderate pulmonary HTN, mPAP 30-33 mm Hg, mRA 9 mm Hg, normal cardiac output and no shunts.  2. Moderately elevated PCWP. ICD 10 Codes: Acute diastolic (congestive) heart failure (CHF)-I50.31  CPT Codes: Right Heart Cath O2/Cardiac output without biopsy (RHC)-78277; Moderate Sedation Services 1st additional 15 minutes patient >5 years-50329; Luis Crook-[id number]  [id number] Nicholas Antonio MD Performing Physician Electronically signed by [id number]  Nicholas Antonio MD on 2/7/2024 at 5:02:34 PM  ** Final **     Transthoracic Echo (TTE) Complete    Result Date: 2/5/2024   [facility], [address] [place] [address]               Tel [phone] and Fax [phone] TRANSTHORACIC ECHOCARDIOGRAM REPORT  Patient Name:      MOE MAYA      Reading Physician:    [id number] Nicholas Antonio MD Study Date:        2/5/2024             Ordering Provider:    [id number] DOMONIQUE PURCELL MRN/PID:           [id number]             Fellow: Accession#:        [id number]         Nurse: Date of Birth/Age: 1955 / 68 years Sonographer:          Nina Angel RD Gender:            F                    Additional Staff: Height:            167.64 cm            Admit Date:           2/2/2024 Weight:            102.51 kg            Admission Status:     Inpatient -                                                               Routine BSA:               2.11 m2              Encounter#:           [id number]                                         Department Location:  [facility] Non                                                               Invasive Blood Pressure: 147 /65 mmHg Study Type:    TRANSTHORACIC ECHO (TTE) COMPLETE Diagnosis/ICD: Acute combined systolic (congestive) and diastolic (congestive)                heart failure (CHF)-I50.41 Indication:    Congestive Heart Failure CPT Code:      Echo Complete w Full Doppler-[id number] Patient History: Diabetes:         Yes Pertinent         A-Fib, HTN, Dyspnea and LE Edema. Watchman device, elevated History:          BNP,. Study Detail: The following Echo studies were performed: 2D, M-Mode, Doppler and               color flow.  PHYSICIAN INTERPRETATION: Left Ventricle: The left ventricular systolic function is  normal, with an estimated ejection fraction of 60-65%. There are no regional wall motion abnormalities. The left ventricular cavity size is normal. Spectral Doppler shows an impaired relaxation pattern of left ventricular diastolic filling. Left Atrium: The left atrium is moderately dilated. Right Ventricle: The right ventricle is mildly enlarged. There is mildly reduced right ventricular systolic function. Right Atrium: The right atrium is moderately dilated. Aortic Valve: The aortic valve is trileaflet. There is mild aortic valve cusp calcification. There is trivial aortic valve regurgitation. The peak instantaneous gradient of the aortic valve is 14.1 mmHg. The mean gradient of the aortic valve is 9.1 mmHg. Mitral Valve: The mitral valve is normal in structure. There is mild mitral annular calcification. There is mild to moderate mitral valve regurgitation. Tricuspid Valve: The tricuspid valve is structurally normal. There is moderate tricuspid regurgitation. Pulmonic Valve: The pulmonic valve is structurally normal. There is mild pulmonic valve regurgitation. Pericardium: There is a small to moderate pericardial effusion posterior to the left ventricle. There is no evidence of cardiac tamponade. Aorta: The aortic root is normal. Pulmonary Artery: The tricuspid regurgitant velocity is 3.04 m/s, and with an estimated right atrial pressure of 3 mmHg, the estimated pulmonary artery pressure is mildly elevated with the RVSP at 39.9 mmHg. Pulmonary Veins: The pulmonary veins appear normal and return normally to the left atrium. Systemic Veins: The inferior vena cava appears to be of normal size. There is IVC inspiratory collapse greater than 50%.  CONCLUSIONS:  1. Left ventricular systolic function is normal with a 60-65% estimated ejection fraction.  2. Spectral Doppler shows an impaired relaxation pattern of left ventricular diastolic filling.  3. There is mildly reduced right ventricular systolic function.  4. The  left atrium is moderately dilated.  5. The right atrium is moderately dilated.  6. There is a small to moderate pericardial effusion.  7. There is no evidence of cardiac tamponade.  8. Mild to moderate mitral valve regurgitation.  9. Moderate tricuspid regurgitation visualized. 10. Normal CVP. Estimated PASP around 45 mm Hg. QUANTITATIVE DATA SUMMARY: 2D MEASUREMENTS:                           Normal Ranges: IVSd:          1.52 cm    (0.6-1.1cm) LVPWd:         1.42 cm    (0.6-1.1cm) LVIDd:         4.35 cm    (3.9-5.9cm) LVIDs:         2.94 cm LV Mass Index: 120.7 g/m2 LV % FS        32.3 % LA VOLUME:                              Normal Ranges: LA Vol A4C:       79.5 ml    (22+/-6mL/m2) LA Vol A2C:       78.2 ml LA Vol BP:        79.7 ml LA Vol Index A4C: 37.7ml/m2 LA Vol Index A2C: 37.1 ml/m2 LA Vol Index BP:  37.9 ml/m2 LA Volume Index:  37.8 ml/m2 LA Vol A4C:       76.0 ml LA Vol A2C:       73.9 ml RA VOLUME BY A/L METHOD:                       Normal Ranges: RA Area A4C: 17.8 cm2 M-MODE MEASUREMENTS:                  Normal Ranges: Ao Root: 3.00 cm (2.0-3.7cm) LAs:     5.08 cm (2.7-4.0cm) LV SYSTOLIC FUNCTION BY 2D PLANIMETRY (MOD):                     Normal Ranges: EF-A4C View: 63.4 % (>=55%) EF-A2C View: 63.8 % EF-Biplane:  62.5 % LV DIASTOLIC FUNCTION:                             Normal Ranges: MV Peak E:      1.33 m/s    (0.7-1.2 m/s) MV Peak A:      0.53 m/s    (0.42-0.7 m/s) E/A Ratio:      2.52        (1.0-2.2) MV e'           0.06 m/s    (>8.0) MV lateral e'   0.06 m/s MV medial e'    0.06 m/s MV A Dur:       110.73 msec E/e' Ratio:     22.09       (<8.0) PulmV Sys Mike:  53.97 cm/s PulmV Dugan Mike: 77.63 cm/s PulmV S/D Mike:  0.70 MITRAL VALVE:                 Normal Ranges: MV DT: 245 msec (150-240msec) MITRAL INSUFFICIENCY:                         Normal Ranges: MR VTI:     171.38 cm MR Vmax:    489.73 cm/s MR Volume:  20.07 ml MR Flow Rt: 57.36 ml/s MR EROA:    0.12 cm2 AORTIC VALVE:                                     Normal Ranges: AoV Vmax:                1.88 m/s  (<=1.7m/s) AoV Peak P.1 mmHg (<20mmHg) AoV Mean P.1 mmHg  (1.7-11.5mmHg) LVOT Max Mike:            0.97 m/s  (<=1.1m/s) AoV VTI:                 46.22 cm  (18-25cm) LVOT VTI:                24.89 cm LVOT Diameter:           1.96 cm   (1.8-2.4cm) AoV Area, VTI:           1.62 cm2  (2.5-5.5cm2) AoV Area,Vmax:           1.56 cm2  (2.5-4.5cm2) AoV Dimensionless Index: 0.54  RIGHT VENTRICLE: RV Basal 3.80 cm RV Mid   2.80 cm RV Major 8.0 cm TAPSE:   18.0 mm RV s'    0.13 m/s TRICUSPID VALVE/RVSP:                             Normal Ranges: Peak TR Velocity: 3.04 m/s RV Syst Pressure: 39.9 mmHg (< 30mmHg) PULMONIC VALVE:                      Normal Ranges: PV Max Mike: 1.0 m/s  (0.6-0.9m/s) PV Max PG:  3.9 mmHg Pulmonary Veins: PulmV Dugan Mike: 77.63 cm/s PulmV S/D Mike:  0.70 PulmV Sys Mike:  53.97 cm/s  [id number] Nicholas Antonio MD Electronically signed on 2024 at 5:06:05 PM  ** Final **     XR knee left 4+ views    Result Date: 2024  Interpreted By:  Adama Jonas, STUDY: Left knee dated  2024.   INDICATION: Signs/Symptoms:left kne pain   COMPARISON: Radiographs dated 10/23/2018.   ACCESSION NUMBER(S): [id number]   ORDERING CLINICIAN: JOSUÉ REICH   TECHNIQUE: Four views of the left knee.   FINDINGS: No fracture or dislocation is evident.  There is a small knee joint effusion. There is moderate tricompartmental degenerative change of the knee. Reticular increased density is seen in the subcutaneous tissues.       1. Small joint effusion and degenerative change of the knee without osseous injury evident. Knowledge of any trauma may be helpful. If there is persistent concern for osseous injury, cross-sectional imaging can be considered. 2. Reticular increased density in the subcutaneous tissues which may represent lymphedema and/or cellulitis.   MACRO: None   Signed by: Adama Jonas 2024 4:24 PM  Dictation workstation:   [id number]    XR chest 1 view    Result Date: 2/2/2024  Interpreted By:  Rayna Carson, STUDY: XR CHEST 1 VIEW;  2/2/2024 2:29 pm   INDICATION: Signs/Symptoms:S/p thoracentesis.   COMPARISON: 02/01/2024   ACCESSION NUMBER(S): [id number]   ORDERING CLINICIAN: AIDAN HERNANDEZ   TECHNIQUE: Portable AP upright   FINDINGS: The cardiac silhouette is enlarged but unchanged. Aorta is atherosclerotic. There is marked improvement in the right pleural effusion since the prior study as a result of interval thoracentesis. No pneumothorax is present. There is minimal fluid residual blunting the costophrenic angle. There is minimal atelectasis at the right lung base. Pulmonary vasculature appears congested. No interval change is noted in the osseous structures.       Marked interval improvement in the right pleural effusion with interval thoracentesis. No pneumothorax.   Pulmonary vascular congestion   Minimal atelectasis right lung base   MACRO: None.   Signed by: Rayna Carson 2/2/2024 3:23 PM Dictation workstation:   [id number]    US renal complete    Result Date: 2/2/2024  Interpreted By:  Amando Starks, STUDY: US RENAL COMPLETE; 2/2/2024 12:23 pm   INDICATION: Signs/Symptoms:ANDREA on CKD.   COMPARISON: Renal ultrasound 05/01/2023   ACCESSION NUMBER(S): [id number]   ORDERING CLINICIAN: JESSICA EPPS   TECHNIQUE: Sonography of the kidneys and urinary bladder was performed.   FINDINGS: Right Kidney: Right total nephrectomy.   Left Kidney: *Renal length: 10.6 cm *Parenchyma: Normal parenchymal echogenicity. Normal parenchymal thickness. *Collecting system: No hydronephrosis. *Calculus: No echogenic, shadowing calculus. *Lesion: None.   Bladder: Normal sonographic appearance.       No left hydronephrosis. Right total nephrectomy.   MACRO: None   Signed by: Amando Starks 2/2/2024 2:36 PM Dictation workstation:   [id number]    ECG 12 lead    Result Date: 2/1/2024  Sinus bradycardia Left  axis deviation Low voltage QRS Possible Anterolateral infarct , age undetermined Abnormal ECG When compared with ECG of 19-JUL-2023 14:12, Previous ECG has undetermined rhythm, needs review Left bundle branch block is no longer Present Borderline criteria for Anterior infarct are now Present Borderline criteria for Anterolateral infarct are now Present See ED provider note for full interpretation and clinical correlation Confirmed by Wanda Juan ([id number]) on 2/1/2024 10:32:50 AM    CT chest wo IV contrast    Result Date: 2/1/2024  Interpreted By:  Beulah Chacon, STUDY: CT CHEST WO IV CONTRAST;  2/1/2024 6:40 am   INDICATION: Signs/Symptoms:sob   COMPARISON: Chest x-ray 02/01/2024. CT chest 05/04/2021   ACCESSION NUMBER(S): [id number]   ORDERING CLINICIAN: BHARGAV SWEET   TECHNIQUE: Axial CT images were obtained through the chest with no intravenous contrast administration.  Coronal and sagittal reformats were performed.   FINDINGS: There is motion artifact.   HEART AND VESSELS: No thoracic aortic aneurysm. Atherosclerotic calcifications are noted at the thoracic aorta. The main pulmonary artery is dilated. The heart is enlarged.   There is a small pericardial effusion. Status post left atrial appendage occlusion device placement.   MEDIASTINUM AND TAMMIE, LOWER NECK AND AXILLA: There is a 1.1 cm peripherally calcified nodule at the left lobe of the thyroid gland.   No obvious pathologically enlarged lymph nodes on unenhanced CT.   LUNGS AND AIRWAYS: The trachea and central airways are patent.   The evaluation of the lungs is degraded by motion artifact. There is a large right pleural effusion with complete collapse of the right lower lobe. Atelectasis/consolidation at right upper lobe and right middle lobe. Tree-in-bud airspace opacities at the right upper lobe and left lower lobe. There is mild atelectasis at the left lower lobe. No left pleural effusion. No pneumothorax.   UPPER ABDOMEN: Motion artifact.  No obvious acute findings.   CHEST WALL AND OSSEOUS STRUCTURES: There is soft tissue edema at the chest wall and visualized abdominal wall. The evaluation for acute fracture is degraded by motion artifact. Multilevel degenerative changes of the spine.       1. Study degraded by motion artifact. Large right pleural effusion with complete collapse of the right lower lobe. Atelectasis/consolidation at the right upper lobe and right middle lobe. Follow-up CT after treatment recommended to ensure complete resolution and exclude underlying mass. 2. Tree-in-bud airspace opacities at the right upper lobe and left lower lobe, may be seen with acute infection/inflammation of the smaller airways such as bronchiolitis or bronchopneumonia. 3. Mild atelectasis at the left lower lobe. 4. Cardiomegaly. Small pericardial effusion. Dilated main pulmonary artery, please correlate for pulmonary arterial hypertension. 5. 1.1 cm peripherally calcified nodule at the left lobe of the thyroid gland. This can be further characterized with nonemergent thyroid ultrasound. 6. Soft tissue edema at the chest wall and visualized abdominal wall.         MACRO:   Critical Finding:  See findings. Notification was initiated on 2/1/2024 at 6:50 am by  Beulah Chacon.  (**-YCF-**) Instructions:   Signed by: Beulah Chacon 2/1/2024 6:56 AM Dictation workstation:   [id number]    XR chest 1 view    Result Date: 2/1/2024  Interpreted By:  Lesley Dooley, STUDY: XR CHEST 1 VIEW;  2/1/2024 3:03 am   INDICATION: Signs/Symptoms:sob.   COMPARISON: 07/19/2023   ACCESSION NUMBER(S): [id number]   ORDERING CLINICIAN: BHARGAV SWEET   FINDINGS:     CARDIOMEDIASTINAL SILHOUETTE: Stable cardiomegaly. Vague density overlying the upper cardiac silhouette may represent a left atrial appendage closure device.   LUNGS: Opacity involving the mid to lower right thorax, likely combination of large right pleural effusion and atelectasis. No pneumothorax.   ABDOMEN: No  remarkable upper abdominal findings.   BONES: No acute osseous abnormality.       New large right pleural effusion and basilar atelectasis. Underlying pneumonia is not excluded in the appropriate clinical setting.   MACRO: None   Signed by: Lesley Dooley 2/1/2024 3:19 AM Dictation workstation:   [id number]      Assessment/Plan        Principal Problem:    Heart failure (CMS/[facility])      Daphne Morris is a 68 y.o. female presenting with past medical history of heart failure, atrial fibrillation, Hypertension, type 2 DM transfer from [facility] ER due to acute hypoxic respiratory failure secondary to pneumonia, COPD exacerbation and acute diastolic heart failure.     #Acute hypoxic respiratory failure secondary to acute on chronic diastolic heart failure and influenza B pneumonia  -Seen and examined  -Hemodynamically stable satting well on BiPAP  -Labs showed hemoglobin of 6.4, serum creatinine of 3.2  -Echocardiogram 2/05/24/revealed LVEF of 60-65%, impaired relaxation, mod dilated atria  -Right heart cath February 7, 2024 revealed moderate pulmonary hypertension, mPAP of 30 to 33 mmHg, mRA 9 mmHg, normal cardiac output, moderately elevated PCWP  -BNP 1444  --Chest x-ray with stable pleural and parenchymal opacities and new left pleural effusion with left basilar atelectasis or edema  Continue BiPAP 22/5m wean O2 as tolerated /  on O2 at 2l/min at baseline  We will start Lasix drip and cardiac diet with 1.5 L fluid restriction  Continue renally dosed Tamiflu  Strict I's and O's and cardiac diet  -Pulmonary on board, patient also on prednisone, Tamiflu, Lasix drip, plan to repeat blood gas and to wean off BiPAP as tolerated  -Cardiology on board, unable to do left heart cath due to elevated creatinine, unable to start SGLT2 inhibitors as well, plan to continue Lasix drip     #Anemia  -Hemoglobin today 6.4  -1 unit of blood ordered  -FOBT pending  -Anemia workup pending  -Bleeding precautions  -Will consider GI consult  based on results    #D-Dimer 13,011  Possibly related to flu  Ultrasound ruled out DVT  VQ scan low probability of PE     #Lower extremity cellulitis  To diuresis, leg elevation  Continue vancomycin     #Elevated troponin  Suspect demand ischemia  EKG with no significant changes from prior EKG  Continue aspirin, Plavix, statin, metoprolol  Cardiology following     #Stage III/IV chronic kidney disease  Status post right nephrectomy  Renal ultrasound negative  Renal function seems to be near baseline  Continue to monitor kidney function as patient is on Lasix drip  Consider nephrology consult if kidney function becomes worse    COPD  Does not appear to be in acute exacerbation  Continue DuoNebs       Paroxysmal atrial fibrillation  Status post watchman in August 2023  Currently in sinus rhythm  Continue amiodarone and metoprolol     Type 2 diabetes mellitus  Continue insulin sliding scale  Holding Lantus due to limited oral intake  Holding Januvia  Continue diabetic diet  Follow Accu-Cheks     Hypertension  Cardiac diet  Neurovascular metoprolol continued  Monitor vitals     GERD  Continue PPI     Hyperlipidemia  Continue atorvastatin     Depression  Continue venlafaxine     Hypothyroidism  Continue Synthroid     DVT prophylaxis  Heparin                    Myke Newsome MD

## 2024-02-20 NOTE — CARE PLAN
The patient's goals for the shift include      The clinical goals for the shift include patient will wean from bipap    Over the shift, the patient did not make progress toward the following goals. Barriers to progression include patient participation. Recommendations to address these barriers include education.

## 2024-02-20 NOTE — SIGNIFICANT EVENT
Acute on chronic anemia.    Hemoglobin dropped down to 6.4.  Patient was seen and examined.  She is awake, alert, oriented x 3.  She gave consent for blood transfusion.  Please note that her nails are very long she had trouble signing her name.  She eventually scribbled something that is  not legible.  Hold subcu heparin.  Give 1 unit of PRBCs.  Start SCDs.  Follow-up stool guaiac.  Repeat H&H at 10 AM after the blood transfusion.  Please note that patient did receive therapeutic a dose of Lovenox prior to admission.  Will monitor.

## 2024-02-20 NOTE — CONSULTS
Consults    PALLIATIVE CARE SOCIAL WORK CONSULT:  Charleen ARTEAGA  CURRENT ATTENDING PROVIDER: Myke Newsome,*     Reason for Consult    [facility]    Introduction to Palliative Care  Met with pt at bedside  Pt was alert and able to answer some questions but appeared to lack insight.   Much of the information obtained from pt's nephew and SANTHOSH Sheppard.  Staff present:  Charleen Strickland  Palliative care was introduced as a service for patients with serious illness to help with symptoms, assist with goals of care conversations, navigate complex decision making, improve quality of life for patients, and provide support to patients and families.  Support and empathy was provided throughout the encounter.    History of Present Illness  Daphne Morris is a 68 y.o. female with past medical history of HF, COPD, afib, HTN, CVA, CKD stage IV, DM. Pt is presenting with dyspnea and hypoxia.      Caregiving/Caregiver Support  Does the patient require assistance in some or all components of his/her care, including coordination of medical care?  yes  If Yes, which person serves that role?   NH    Personal History  Pt is .  Pt had two dtrs.  One dtr  age 29 from an asthma attack . Other dtr is special needs and recently became a resident at a NH in [place].  Per nephew, their relationship has been tenuous.  Nephew is not sure if pt would want to be in the same NH.  Pt did sauteing, .  Pt was injured at work in her 50s and went on SSD.      Depression  unable to assess        Anxiety   unable to assess     Advance Directives  Surrogate Health Care Decision Maker:  gale Sheppard  HCPOA  yes  Living Will  no  On file     Code Status                    Pt is unable to understand today.  Spoke with nephew at length.  Nephew states that pt has always said 'no machines'.  Reviewed risks with CPR and pt's poor lung status.  Nephew states pt has been a heavy smoker (pt asked for a cigarette) most of her life and overall  "has not taken care of herself for years. Based on pt's expressed wishes and overall poor lung status, nephew agreed to DNR DNI.     Serious Illness Conversation        Life Limiting Disease:  heart, lung, kidneys  Disease Specific Information Provided:  relationship of organs, pt's kidneys continue to worsen.  What is your understanding now of where you are with your illness:  nephew seems to  understand pt's critical health  What are you hoping for:  pt feels she will be able to return home.  How much does your family know about your priorities and wishes:  nephew states pt clearly indicated \"no machines'.  Unclear whether pt would consent to dialysis.      Other distressing Symptoms        Pt has confusion and some agitation, sitter present.      Plan and Social Considerations  Monitor pt daily.  Pt will need to dc to long term care as she is unable to manage at home.  Decision to start dialysis needs to be made.      Plan of Care discussed with: team    Thank you for asking Palliative Care to assist with care of this patient.  We will continue to follow  Please contact us for additional questions or concerns.    CHANDLER Bradshaw  Palliative Care   Contact Via Epic Secure chat   "

## 2024-02-21 ENCOUNTER — APPOINTMENT (OUTPATIENT)
Dept: RADIOLOGY | Facility: HOSPITAL | Age: 69
DRG: 291 | End: 2024-02-21
Payer: MEDICARE

## 2024-02-21 LAB
ALBUMIN SERPL BCP-MCNC: 2.4 G/DL (ref 3.4–5)
ANION GAP BLDA CALCULATED.4IONS-SCNC: 8 MMO/L (ref 10–25)
ANION GAP SERPL CALC-SCNC: 14 MMOL/L (ref 10–20)
ARTERIAL PATENCY WRIST A: POSITIVE
BASE EXCESS BLDA CALC-SCNC: 2.9 MMOL/L (ref -2–3)
BASOPHILS # BLD AUTO: 0 X10*3/UL (ref 0–0.1)
BASOPHILS NFR BLD AUTO: 0 %
BLOOD EXPIRATION DATE: NORMAL
BNP SERPL-MCNC: 1443 PG/ML (ref 0–99)
BODY TEMPERATURE: ABNORMAL
BUN SERPL-MCNC: 69 MG/DL (ref 6–23)
CA-I BLDA-SCNC: 1.11 MMOL/L (ref 1.1–1.33)
CALCIUM SERPL-MCNC: 7.6 MG/DL (ref 8.6–10.3)
CHLORIDE BLDA-SCNC: 98 MMOL/L (ref 98–107)
CHLORIDE SERPL-SCNC: 96 MMOL/L (ref 98–107)
CO2 SERPL-SCNC: 29 MMOL/L (ref 21–32)
CREAT SERPL-MCNC: 3.51 MG/DL (ref 0.5–1.05)
DISPENSE STATUS: NORMAL
EGFRCR SERPLBLD CKD-EPI 2021: 14 ML/MIN/1.73M*2
EOSINOPHIL # BLD AUTO: 0 X10*3/UL (ref 0–0.7)
EOSINOPHIL NFR BLD AUTO: 0 %
EPAP CMH2O: 5 CM H2O
ERYTHROCYTE [DISTWIDTH] IN BLOOD BY AUTOMATED COUNT: 14.1 % (ref 11.5–14.5)
GLUCOSE BLD MANUAL STRIP-MCNC: 154 MG/DL (ref 74–99)
GLUCOSE BLD MANUAL STRIP-MCNC: 184 MG/DL (ref 74–99)
GLUCOSE BLD MANUAL STRIP-MCNC: 262 MG/DL (ref 74–99)
GLUCOSE BLDA-MCNC: 176 MG/DL (ref 74–99)
GLUCOSE SERPL-MCNC: 179 MG/DL (ref 74–99)
HCO3 BLDA-SCNC: 28.8 MMOL/L (ref 22–26)
HCT VFR BLD AUTO: 24.5 % (ref 36–46)
HCT VFR BLD EST: 23 % (ref 36–46)
HGB BLD-MCNC: 7.5 G/DL (ref 12–16)
HGB BLDA-MCNC: 7.7 G/DL (ref 12–16)
IMM GRANULOCYTES # BLD AUTO: 0.05 X10*3/UL (ref 0–0.7)
IMM GRANULOCYTES NFR BLD AUTO: 1.1 % (ref 0–0.9)
INHALED O2 CONCENTRATION: 60 %
IPAP CMH2O: 25 CM H2O
LACTATE BLDA-SCNC: 0.4 MMOL/L (ref 0.4–2)
LYMPHOCYTES # BLD AUTO: 0.42 X10*3/UL (ref 1.2–4.8)
LYMPHOCYTES NFR BLD AUTO: 9.6 %
MAGNESIUM SERPL-MCNC: 1.96 MG/DL (ref 1.6–2.4)
MCH RBC QN AUTO: 29.5 PG (ref 26–34)
MCHC RBC AUTO-ENTMCNC: 30.6 G/DL (ref 32–36)
MCV RBC AUTO: 97 FL (ref 80–100)
MONOCYTES # BLD AUTO: 0.19 X10*3/UL (ref 0.1–1)
MONOCYTES NFR BLD AUTO: 4.4 %
NEUTROPHILS # BLD AUTO: 3.7 X10*3/UL (ref 1.2–7.7)
NEUTROPHILS NFR BLD AUTO: 84.9 %
NRBC BLD-RTO: 0 /100 WBCS (ref 0–0)
OXYHGB MFR BLDA: 98.2 % (ref 94–98)
PCO2 BLDA: 51 MM HG (ref 38–42)
PH BLDA: 7.36 PH (ref 7.38–7.42)
PHOSPHATE SERPL-MCNC: 5.8 MG/DL (ref 2.5–4.9)
PLATELET # BLD AUTO: 223 X10*3/UL (ref 150–450)
PO2 BLDA: 119 MM HG (ref 85–95)
POTASSIUM BLDA-SCNC: 5.1 MMOL/L (ref 3.5–5.3)
POTASSIUM SERPL-SCNC: 5 MMOL/L (ref 3.5–5.3)
PRODUCT BLOOD TYPE: 1700
PRODUCT CODE: NORMAL
RBC # BLD AUTO: 2.54 X10*6/UL (ref 4–5.2)
SAO2 % BLDA: 99 % (ref 94–100)
SODIUM BLDA-SCNC: 130 MMOL/L (ref 136–145)
SODIUM SERPL-SCNC: 134 MMOL/L (ref 136–145)
SPECIMEN DRAWN FROM PATIENT: ABNORMAL
UNIT ABO: NORMAL
UNIT NUMBER: NORMAL
UNIT RH: NORMAL
UNIT VOLUME: 283
VANCOMYCIN SERPL-MCNC: 14.5 UG/ML (ref 5–20)
WBC # BLD AUTO: 4.4 X10*3/UL (ref 4.4–11.3)
XM INTEP: NORMAL

## 2024-02-21 PROCEDURE — 2500000002 HC RX 250 W HCPCS SELF ADMINISTERED DRUGS (ALT 637 FOR MEDICARE OP, ALT 636 FOR OP/ED): Performed by: FAMILY MEDICINE

## 2024-02-21 PROCEDURE — 2500000004 HC RX 250 GENERAL PHARMACY W/ HCPCS (ALT 636 FOR OP/ED): Performed by: FAMILY MEDICINE

## 2024-02-21 PROCEDURE — 82947 ASSAY GLUCOSE BLOOD QUANT: CPT

## 2024-02-21 PROCEDURE — 71045 X-RAY EXAM CHEST 1 VIEW: CPT

## 2024-02-21 PROCEDURE — 94640 AIRWAY INHALATION TREATMENT: CPT

## 2024-02-21 PROCEDURE — 71045 X-RAY EXAM CHEST 1 VIEW: CPT | Performed by: RADIOLOGY

## 2024-02-21 PROCEDURE — 2500000001 HC RX 250 WO HCPCS SELF ADMINISTERED DRUGS (ALT 637 FOR MEDICARE OP)

## 2024-02-21 PROCEDURE — 99233 SBSQ HOSP IP/OBS HIGH 50: CPT

## 2024-02-21 PROCEDURE — 2500000002 HC RX 250 W HCPCS SELF ADMINISTERED DRUGS (ALT 637 FOR MEDICARE OP, ALT 636 FOR OP/ED)

## 2024-02-21 PROCEDURE — 36415 COLL VENOUS BLD VENIPUNCTURE: CPT | Performed by: INTERNAL MEDICINE

## 2024-02-21 PROCEDURE — 83880 ASSAY OF NATRIURETIC PEPTIDE: CPT | Performed by: INTERNAL MEDICINE

## 2024-02-21 PROCEDURE — 97162 PT EVAL MOD COMPLEX 30 MIN: CPT | Mod: GP

## 2024-02-21 PROCEDURE — 97165 OT EVAL LOW COMPLEX 30 MIN: CPT | Mod: GO

## 2024-02-21 PROCEDURE — 36600 WITHDRAWAL OF ARTERIAL BLOOD: CPT

## 2024-02-21 PROCEDURE — 85025 COMPLETE CBC W/AUTO DIFF WBC: CPT | Performed by: INTERNAL MEDICINE

## 2024-02-21 PROCEDURE — 94668 MNPJ CHEST WALL SBSQ: CPT

## 2024-02-21 PROCEDURE — 84132 ASSAY OF SERUM POTASSIUM: CPT | Performed by: INTERNAL MEDICINE

## 2024-02-21 PROCEDURE — 83735 ASSAY OF MAGNESIUM: CPT | Performed by: INTERNAL MEDICINE

## 2024-02-21 PROCEDURE — 80202 ASSAY OF VANCOMYCIN: CPT

## 2024-02-21 PROCEDURE — 99233 SBSQ HOSP IP/OBS HIGH 50: CPT | Performed by: INTERNAL MEDICINE

## 2024-02-21 PROCEDURE — 2500000004 HC RX 250 GENERAL PHARMACY W/ HCPCS (ALT 636 FOR OP/ED)

## 2024-02-21 PROCEDURE — 1200000002 HC GENERAL ROOM WITH TELEMETRY DAILY

## 2024-02-21 PROCEDURE — 84132 ASSAY OF SERUM POTASSIUM: CPT

## 2024-02-21 RX ORDER — VANCOMYCIN HYDROCHLORIDE 500 MG/100ML
500 INJECTION, SOLUTION INTRAVENOUS EVERY 24 HOURS
Status: DISCONTINUED | OUTPATIENT
Start: 2024-02-21 | End: 2024-02-22

## 2024-02-21 RX ADMIN — METOPROLOL SUCCINATE 50 MG: 50 TABLET, EXTENDED RELEASE ORAL at 20:43

## 2024-02-21 RX ADMIN — VENLAFAXINE HYDROCHLORIDE 75 MG: 75 CAPSULE, EXTENDED RELEASE ORAL at 09:05

## 2024-02-21 RX ADMIN — LEVOTHYROXINE SODIUM 200 MCG: 100 TABLET ORAL at 06:16

## 2024-02-21 RX ADMIN — OSELTAMAVIR PHOSPHATE 30 MG: 30 CAPSULE ORAL at 09:05

## 2024-02-21 RX ADMIN — VANCOMYCIN HYDROCHLORIDE 500 MG: 500 INJECTION, SOLUTION INTRAVENOUS at 14:43

## 2024-02-21 RX ADMIN — PANTOPRAZOLE SODIUM 40 MG: 40 TABLET, DELAYED RELEASE ORAL at 09:04

## 2024-02-21 RX ADMIN — METOPROLOL SUCCINATE 50 MG: 50 TABLET, EXTENDED RELEASE ORAL at 09:03

## 2024-02-21 RX ADMIN — FERROUS GLUCONATE 324 MG: 324 TABLET ORAL at 09:05

## 2024-02-21 RX ADMIN — CLOPIDOGREL 75 MG: 75 TABLET ORAL at 09:04

## 2024-02-21 RX ADMIN — PANTOPRAZOLE SODIUM 40 MG: 40 TABLET, DELAYED RELEASE ORAL at 20:44

## 2024-02-21 RX ADMIN — AMLODIPINE BESYLATE 5 MG: 5 TABLET ORAL at 09:03

## 2024-02-21 RX ADMIN — INSULIN LISPRO 3 UNITS: 100 INJECTION, SOLUTION INTRAVENOUS; SUBCUTANEOUS at 13:41

## 2024-02-21 RX ADMIN — ASPIRIN 81 MG: 81 TABLET, COATED ORAL at 09:04

## 2024-02-21 RX ADMIN — ATORVASTATIN CALCIUM 40 MG: 40 TABLET, FILM COATED ORAL at 09:04

## 2024-02-21 RX ADMIN — IPRATROPIUM BROMIDE AND ALBUTEROL SULFATE 3 ML: 2.5; .5 SOLUTION RESPIRATORY (INHALATION) at 07:35

## 2024-02-21 RX ADMIN — FUROSEMIDE 10 MG/HR: 10 INJECTION, SOLUTION INTRAMUSCULAR; INTRAVENOUS at 10:12

## 2024-02-21 RX ADMIN — IPRATROPIUM BROMIDE AND ALBUTEROL SULFATE 3 ML: 2.5; .5 SOLUTION RESPIRATORY (INHALATION) at 19:56

## 2024-02-21 RX ADMIN — Medication 5 MG: at 20:44

## 2024-02-21 RX ADMIN — IPRATROPIUM BROMIDE AND ALBUTEROL SULFATE 3 ML: 2.5; .5 SOLUTION RESPIRATORY (INHALATION) at 14:30

## 2024-02-21 RX ADMIN — AMIODARONE HYDROCHLORIDE 200 MG: 200 TABLET ORAL at 09:04

## 2024-02-21 RX ADMIN — INSULIN LISPRO 3 UNITS: 100 INJECTION, SOLUTION INTRAVENOUS; SUBCUTANEOUS at 09:22

## 2024-02-21 RX ADMIN — PREDNISONE 40 MG: 20 TABLET ORAL at 09:04

## 2024-02-21 ASSESSMENT — COGNITIVE AND FUNCTIONAL STATUS - GENERAL
PERSONAL GROOMING: A LOT
DRESSING REGULAR LOWER BODY CLOTHING: TOTAL
WALKING IN HOSPITAL ROOM: TOTAL
DRESSING REGULAR UPPER BODY CLOTHING: A LOT
CLIMB 3 TO 5 STEPS WITH RAILING: TOTAL
DRESSING REGULAR UPPER BODY CLOTHING: A LOT
MOVING FROM LYING ON BACK TO SITTING ON SIDE OF FLAT BED WITH BEDRAILS: TOTAL
MOVING TO AND FROM BED TO CHAIR: TOTAL
DRESSING REGULAR UPPER BODY CLOTHING: A LOT
STANDING UP FROM CHAIR USING ARMS: TOTAL
PERSONAL GROOMING: A LITTLE
EATING MEALS: A LOT
HELP NEEDED FOR BATHING: A LOT
TURNING FROM BACK TO SIDE WHILE IN FLAT BAD: TOTAL
TURNING FROM BACK TO SIDE WHILE IN FLAT BAD: TOTAL
MOVING TO AND FROM BED TO CHAIR: A LOT
EATING MEALS: A LITTLE
TOILETING: A LOT
TURNING FROM BACK TO SIDE WHILE IN FLAT BAD: A LOT
HELP NEEDED FOR BATHING: A LOT
STANDING UP FROM CHAIR USING ARMS: A LOT
STANDING UP FROM CHAIR USING ARMS: TOTAL
TOILETING: TOTAL
MOBILITY SCORE: 12
MOBILITY SCORE: 6
MOVING TO AND FROM BED TO CHAIR: TOTAL
DAILY ACTIVITIY SCORE: 12
CLIMB 3 TO 5 STEPS WITH RAILING: TOTAL
PERSONAL GROOMING: A LITTLE
DRESSING REGULAR LOWER BODY CLOTHING: TOTAL
MOVING FROM LYING ON BACK TO SITTING ON SIDE OF FLAT BED WITH BEDRAILS: A LOT
TOILETING: TOTAL
EATING MEALS: A LITTLE
WALKING IN HOSPITAL ROOM: TOTAL
DRESSING REGULAR LOWER BODY CLOTHING: A LOT
MOBILITY SCORE: 6
DAILY ACTIVITIY SCORE: 12
DAILY ACTIVITIY SCORE: 12
WALKING IN HOSPITAL ROOM: A LOT
CLIMB 3 TO 5 STEPS WITH RAILING: A LOT
HELP NEEDED FOR BATHING: A LOT
MOVING FROM LYING ON BACK TO SITTING ON SIDE OF FLAT BED WITH BEDRAILS: TOTAL

## 2024-02-21 ASSESSMENT — ACTIVITIES OF DAILY LIVING (ADL)
ADLS_ADDRESSED: YES
BATHING_ASSISTANCE: TOTAL
ADL_ASSISTANCE: NEEDS ASSISTANCE

## 2024-02-21 ASSESSMENT — PAIN SCALES - GENERAL
PAINLEVEL_OUTOF10: 0 - NO PAIN

## 2024-02-21 ASSESSMENT — PAIN - FUNCTIONAL ASSESSMENT
PAIN_FUNCTIONAL_ASSESSMENT: 0-10

## 2024-02-21 NOTE — CARE PLAN
The patient's goals for the shift include  remain comfortable throughout shift.    The clinical goals for the shift include pt will  remain HDS throughout shift.

## 2024-02-21 NOTE — PROGRESS NOTES
"Daphne Morris is a 68 y.o. female on day 2 of admission presenting with Heart failure (CMS/[facility]).    Subjective   No acute events overnight. Patient reports feeling better this AM, but absolutely hates the BiPAP. Able to be placed on 7L. Turned her down to 5, however desaturated, now back up to 8L. Patient appears to be more awake and alert this morning, she states she feels better.     Objective     Physical Exam  Vitals reviewed.   Constitutional:       Appearance: She is ill-appearing.   Cardiovascular:      Rate and Rhythm: Normal rate and regular rhythm.      Heart sounds: Normal heart sounds. No murmur heard.  Pulmonary:      Breath sounds: Wheezing present.      Comments: Bilateral wheezes heard on auscultation  Abdominal:      General: There is no distension.      Palpations: Abdomen is soft.      Tenderness: There is no abdominal tenderness.   Musculoskeletal:         General: Swelling present. No tenderness.      Right lower leg: Edema present.      Left lower leg: Edema present.   Neurological:      General: No focal deficit present.      Mental Status: She is alert and oriented to person, place, and time. Mental status is at baseline.         Last Recorded Vitals  Blood pressure 131/76, pulse 56, temperature 36.4 °C (97.5 °F), temperature source Temporal, resp. rate 21, height 1.676 m (5' 6\"), weight 99.4 kg (219 lb 3.2 oz), SpO2 96 %.  Intake/Output last 3 Shifts:  I/O last 3 completed shifts:  In: 2953.6 (29.7 mL/kg) [P.O.:1200; I.V.:32.8 (0.3 mL/kg); Blood:1720.8]  Out: 535 (5.4 mL/kg) [Urine:535 (0.1 mL/kg/hr)]  Weight: 99.4 kg     Relevant Results  Scheduled medications  amiodarone, 200 mg, oral, Daily with breakfast  amLODIPine, 5 mg, oral, Daily  aspirin, 81 mg, oral, Daily  atorvastatin, 40 mg, oral, Daily  clopidogrel, 75 mg, oral, Daily  ferrous gluconate, 324 mg, oral, Daily with breakfast  [Held by provider] furosemide, 80 mg, oral, BID  [Held by provider] heparin (porcine), 5,000 Units, " subcutaneous, q8h  [Held by provider] insulin glargine, 14 Units, subcutaneous, Nightly  insulin lispro, 0-15 Units, subcutaneous, TID with meals  ipratropium-albuteroL, 3 mL, nebulization, TID  levothyroxine, 200 mcg, oral, Daily before breakfast  lidocaine, 5 mL, infiltration, Once  melatonin, 5 mg, oral, Daily  metoprolol succinate XL, 50 mg, oral, BID  oseltamivir, 30 mg, oral, Daily  pantoprazole, 40 mg, oral, BID  polyethylene glycol, 17 g, oral, Daily  potassium chloride CR, 20 mEq, oral, Daily  potassium chloride CR, 40 mEq, oral, Daily  predniSONE, 40 mg, oral, Daily  vancomycin, 500 mg, intravenous, q24h  venlafaxine XR, 75 mg, oral, Daily      Continuous medications  furosemide, 10 mg/hr, Last Rate: 10 mg/hr (02/21/24 1207)      PRN medications  PRN medications: acetaminophen **OR** acetaminophen **OR** acetaminophen, acetaminophen **OR** acetaminophen **OR** acetaminophen, albuterol, dextrose 10 % in water (D10W), dextrose, diphenhydrAMINE, glucagon, ipratropium-albuteroL, ondansetron **OR** ondansetron, oxygen, oxygen    Results for orders placed or performed during the hospital encounter of 02/19/24 (from the past 24 hour(s))   POCT GLUCOSE   Result Value Ref Range    POCT Glucose 139 (H) 74 - 99 mg/dL   POCT GLUCOSE   Result Value Ref Range    POCT Glucose 182 (H) 74 - 99 mg/dL   Vancomycin   Result Value Ref Range    Vancomycin 14.5 5.0 - 20.0 ug/mL   CBC and Auto Differential   Result Value Ref Range    WBC 4.4 4.4 - 11.3 x10*3/uL    nRBC 0.0 0.0 - 0.0 /100 WBCs    RBC 2.54 (L) 4.00 - 5.20 x10*6/uL    Hemoglobin 7.5 (L) 12.0 - 16.0 g/dL    Hematocrit 24.5 (L) 36.0 - 46.0 %    MCV 97 80 - 100 fL    MCH 29.5 26.0 - 34.0 pg    MCHC 30.6 (L) 32.0 - 36.0 g/dL    RDW 14.1 11.5 - 14.5 %    Platelets 223 150 - 450 x10*3/uL    Neutrophils % 84.9 40.0 - 80.0 %    Immature Granulocytes %, Automated 1.1 (H) 0.0 - 0.9 %    Lymphocytes % 9.6 13.0 - 44.0 %    Monocytes % 4.4 2.0 - 10.0 %    Eosinophils % 0.0 0.0 -  6.0 %    Basophils % 0.0 0.0 - 2.0 %    Neutrophils Absolute 3.70 1.20 - 7.70 x10*3/uL    Immature Granulocytes Absolute, Automated 0.05 0.00 - 0.70 x10*3/uL    Lymphocytes Absolute 0.42 (L) 1.20 - 4.80 x10*3/uL    Monocytes Absolute 0.19 0.10 - 1.00 x10*3/uL    Eosinophils Absolute 0.00 0.00 - 0.70 x10*3/uL    Basophils Absolute 0.00 0.00 - 0.10 x10*3/uL   Renal Function Panel   Result Value Ref Range    Glucose 179 (H) 74 - 99 mg/dL    Sodium 134 (L) 136 - 145 mmol/L    Potassium 5.0 3.5 - 5.3 mmol/L    Chloride 96 (L) 98 - 107 mmol/L    Bicarbonate 29 21 - 32 mmol/L    Anion Gap 14 10 - 20 mmol/L    Urea Nitrogen 69 (H) 6 - 23 mg/dL    Creatinine 3.51 (H) 0.50 - 1.05 mg/dL    eGFR 14 (L) >60 mL/min/1.73m*2    Calcium 7.6 (L) 8.6 - 10.3 mg/dL    Phosphorus 5.8 (H) 2.5 - 4.9 mg/dL    Albumin 2.4 (L) 3.4 - 5.0 g/dL   Magnesium   Result Value Ref Range    Magnesium 1.96 1.60 - 2.40 mg/dL   B-type natriuretic peptide   Result Value Ref Range    BNP 1,443 (H) 0 - 99 pg/mL   POCT GLUCOSE   Result Value Ref Range    POCT Glucose 184 (H) 74 - 99 mg/dL   Blood Gas Arterial Full Panel   Result Value Ref Range    POCT pH, Arterial 7.36 (L) 7.38 - 7.42 pH    POCT pCO2, Arterial 51 (H) 38 - 42 mm Hg    POCT pO2, Arterial 119 (H) 85 - 95 mm Hg    POCT SO2, Arterial 99 94 - 100 %    POCT Oxy Hemoglobin, Arterial 98.2 (H) 94.0 - 98.0 %    POCT Hematocrit Calculated, Arterial 23.0 (L) 36.0 - 46.0 %    POCT Sodium, Arterial 130 (L) 136 - 145 mmol/L    POCT Potassium, Arterial 5.1 3.5 - 5.3 mmol/L    POCT Chloride, Arterial 98 98 - 107 mmol/L    POCT Ionized Calcium, Arterial 1.11 1.10 - 1.33 mmol/L    POCT Glucose, Arterial 176 (H) 74 - 99 mg/dL    POCT Lactate, Arterial 0.4 0.4 - 2.0 mmol/L    POCT Base Excess, Arterial 2.9 -2.0 - 3.0 mmol/L    POCT HCO3 Calculated, Arterial 28.8 (H) 22.0 - 26.0 mmol/L    POCT Hemoglobin, Arterial 7.7 (L) 12.0 - 16.0 g/dL    POCT Anion Gap, Arterial 8 (L) 10 - 25 mmo/L    Patient Temperature       FiO2 60 %    Ipap CMH2O 25.0 cm H2O    Epap CMH2O 5.0 cm H2O    Site of Arterial Puncture Radial Right     Jonathan's Test Positive    POCT GLUCOSE   Result Value Ref Range    POCT Glucose 154 (H) 74 - 99 mg/dL         Assessment/Plan   #Acute on chronic hypoxic hypercapnic respiratory failure 2/2 HFpEF exacerbation, COPD exacerbation  #Respiratory acidosis requiring BiPAP  #Influenza B positive  #Rule out pneumonia  - Currently on 8L, wean O2 as tolerated to baseline 2L.   - Continue prednisone 40 mg daily  - Scheduled DuoNebs  - Continue Tamiflu  - ABG this AM showed mild acidosis, but does not require BiPAP  - Due to improvement of symptoms, can hold off on diuresis to allow kidney function to improve. Will continue to monitor and evaluate for the need for potential thoracentesis  - Will trial a night off of BiPAP, will get a repeat ABG tomorrow morning to assess acid-base status.    Hailey Sue MD

## 2024-02-21 NOTE — PROGRESS NOTES
Occupational Therapy    Evaluation    Patient Name: Daphne Morris  MRN: [id number]  Today's Date: 2/21/2024  Time Calculation  Start Time: 1106  Stop Time: 1125  Time Calculation (min): 19 min    Assessment  IP OT Assessment  OT Assessment: Pt presents with decreased ADL performance, decreased functional mobility, decreased endurance. Continued skilled OT recommended to maximize pt safety and independence.  Prognosis: Fair  Barriers to Discharge: None  Evaluation/Treatment Tolerance: Patient limited by fatigue  Medical Staff Made Aware: Yes  End of Session Communication: Bedside nurse  End of Session Patient Position: Bed, 3 rail up, Alarm on  Plan:  Treatment Interventions: ADL retraining, Functional transfer training, UE strengthening/ROM, Endurance training  OT Frequency: 3 times per week  OT Discharge Recommendations: Moderate intensity level of continued care  OT Recommended Transfer Status: Assist of 2  OT - OK to Discharge: Yes (per OT POC)    Subjective   Current Problem:  1. Heart failure (CMS/HCC)          General:  General  Reason for Referral: 67 yo female referred to OT for impaired ADL, impaired mobility  Referred By: Myke Newsome MD  Past Medical History Relevant to Rehab: heart failure, atrial fibrillation, Hypertension, type 2 DM  Co-Treatment: PT  Co-Treatment Reason: To maximize pt safety and outcomes  Prior to Session Communication: Bedside nurse  Patient Position Received: Bed, 3 rail up, Alarm on  General Comment: Pt pleasant, cooperative with therapy eval. Tele, IV, 8L O2, purewick in place  Precautions:  Medical Precautions: Fall precautions, Oxygen therapy device and L/min (8L)  Precautions Comment: droplet, (+) flu     Pain:  Pain Assessment  Pain Assessment: 0-10  Pain Score: 0 - No pain    Objective   Home Living:  Type of Home: Mobile home  Lives With: Alone  Home Adaptive Equipment:  (rollator)  Home Layout: One level  Home Access: Stairs to enter with rails  Entrance  Stairs-Rails: Both  Entrance Stairs-Number of Steps: 6  Bathroom Shower/Tub: Walk-in shower  Bathroom Toilet: Adaptive toilet seating  Bathroom Equipment: Raised toilet seat with rails   Prior Function:  Level of [place]: Independent with ADLs and functional transfers, Needs assistance with homemaking  Prior Function Comments: Spongebathed PTA; neighbor comes over and helps with all IADL; used rollator for household amb and doesn't leave home unless needed. Admits to extensive hx of falls at home    ADL:  Eating Assistance: Minimal  Grooming Assistance: Minimal  Bathing Assistance: Total  UE Dressing Assistance: Minimal  LE Dressing Assistance: Total  Toileting Assistance with Device: Maximal  Functional Assistance: Maximal  ADL Comments: ADL anticipated d/t current clinical presentation  Activity Tolerance:  Endurance: Tolerates less than 10 min exercise with changes in vital signs  Bed Mobility/Transfers: Bed Mobility  Bed Mobility: Yes  Bed Mobility 1  Bed Mobility 1: Supine to sitting  Level of Assistance 1: Moderate assistance (x2)  Bed Mobility Comments 1: assist with BLE and trunk, increased time  Bed Mobility 2  Bed Mobility  2: Sitting to supine, Scooting  Level of Assistance 2: Maximum assistance (x2)    Transfers  Transfer: No (Not medically appropriate to attempt at this time)    Sitting Balance:  Static Sitting Balance  Static Sitting-Balance Support: Feet supported, No upper extremity supported  Static Sitting-Level of Assistance: Minimum assistance  Static Sitting-Comment/Number of Minutes: Tolerated 3-4 minutes of supported sitting EOB, SpO2 desatting to mid 80's but returned to normal once supine.    Strength:  Strength Comments: Unable to formally assess BUE    Extremities: RUE   RUE : Within Functional Limits and LUE   LUE: Within Functional Limits    Outcome Measures: [facility] Daily Activity  Putting on and taking off regular lower body clothing: Total  Bathing (including washing, rinsing,  drying): A lot  Putting on and taking off regular upper body clothing: A lot  Toileting, which includes using toilet, bedpan or urinal: Total  Taking care of personal grooming such as brushing teeth: A little  Eating Meals: A little  Daily Activity - Total Score: 12    Education Documentation  Body Mechanics, taught by Trevor Cruz OT at 2/21/2024 11:45 AM.  Learner: Patient  Readiness: Acceptance  Method: Explanation  Response: Verbalizes Understanding    Precautions, taught by Trevor Cruz OT at 2/21/2024 11:45 AM.  Learner: Patient  Readiness: Acceptance  Method: Explanation  Response: Verbalizes Understanding    ADL Training, taught by Trevor Cruz OT at 2/21/2024 11:45 AM.  Learner: Patient  Readiness: Acceptance  Method: Explanation  Response: Verbalizes Understanding    Education Comments  No comments found.      Goals:   Encounter Problems       Encounter Problems (Active)       OT Goals       Pt will demo UE ADL completion with set-up/supervision using AE if needed.  (Progressing)       Start:  02/21/24    Expected End:  03/06/24            Pt will increase endurance to tolerate 10min of OOB activity with no more than 1 rest break in order to increase ability to engage in ADL completion.  (Progressing)       Start:  02/21/24    Expected End:  03/06/24            Pt will increase static/dynamic sitting to Good to increase safety and independence with functional task completion.   (Progressing)       Start:  02/21/24    Expected End:  03/06/24            Pt will complete erpb-ib-bewt transfers using LRD in preparation for ADLs with Min A (Progressing)       Start:  02/21/24    Expected End:  03/06/24

## 2024-02-21 NOTE — CONSULTS
Wound Care Consult     Visit Date: 2/21/2024      Patient Name: Daphne Morris         MRN: [id number]           YOB: 1955     Reason for Consult: wounds        Wound History: patient seen previously by wound team     Pertinent Labs:   [place], Urine   Date Value Ref Range Status   07/15/2020 1,714 (H) 0 - 23 MG/L Final     Comment:     RECHECKED DILUTED  Performed at [address] [address]       Albumin   Date Value Ref Range Status   02/21/2024 2.4 (L) 3.4 - 5.0 g/dL Final     Albumin, Urine Random   Date Value Ref Range Status   02/01/2024 >2,250.0 Not established mg/L Final       Wound Assessment:  Wound 02/01/24 Skin Tear Knee Left;Anterior (Active)   Wound Image   02/19/24 0429   Site Assessment Blanchable erythema;Red 02/19/24 1100   Dressing Other (Comment) 02/20/24 2105   Dressing Changed Changed 02/20/24 1300   Dressing Status Clean;Dry 02/20/24 1300       Wound 02/01/24 Other (comment) Dorsal foot;Left;Anterior (Active)       Wound 02/02/24 Other (comment) Heel Left (Active)       Wound 02/02/24 Other (comment) Heel Right (Active)       Wound 02/07/24 Incision Throat Right (Active)       Wound 02/19/24 Pressure Injury Pretibial Left (Active)   Wound Image   02/19/24 0431   Site Assessment Red 02/19/24 2050   Dressing Kerlix/rolled gauze;Xeroform 02/20/24 2105   Dressing Changed Reinforced 02/20/24 2105   Dressing Status Clean;Dry 02/20/24 2105       Wound 02/19/24 Pressure Injury Pretibial Right (Active)   Wound Image   02/19/24 0432   Site Assessment Red 02/19/24 2050   Dressing Xeroform;Kerlix/rolled gauze 02/20/24 2105   Dressing Changed Reinforced 02/20/24 2105   Dressing Status Clean;Dry 02/20/24 2105       Wound Team Summary Assessment:   Patient seen in bed, sitting upright, requesting water for dry mouth.   Left knee- scar with small, shallow open area at distal end  BLE-reddened BLE, small amount of epidermal erosion, dry flaky skin, wound on left medial  leg shallow, scant serous drainage.     Wound Team Plan:   Continue moist wound healing by obtaining orders for Xeroform, ABD, Kerlix and ACE wraps for gentle compression.  True robert boots to off load and properly align legs. Patient happy with progress.  Rose BARRON at bedside. Q2 turns.      Day Gomez RN  2/21/2024  2:57 PM

## 2024-02-21 NOTE — PROGRESS NOTES
02/21/24 1607   Discharge Planning   Living Arrangements Other (Comment)   Support Systems Friends/neighbors   Assistance Needed Patient arrived from [facility], patient was hospitalized here from 2/1-2/15, prior to that was home alone, A&0x3, ambulates with rollator baseline, doesn't drive, while home was on 3 L at HS only, has NPdoing home visits Poly Diaz   Type of Residence Skilled nursing facility   Do you have animals or pets at home? No   Who is requesting discharge planning? Provider   Home or Post Acute Services Post acute facilities (Rehab/SNF/etc)   Type of Post Acute Facility Services Skilled nursing   Patient expects to be discharged to: From [facility],PT/OT recomends Moderate, patient will require precert, not started yet. Awaiting further medical workup.   Does the patient need discharge transport arranged? Yes   RoundTrip coordination needed? Yes   Has discharge transport been arranged? No

## 2024-02-21 NOTE — PROGRESS NOTES
Patient known to me from recent hospitalization for ANDREA and acute decompensated heart failure.  She responded to high-dose IV Lasix with improvement in her heart failure and ANDREA and was discharged recently on Lasix 80 mg twice daily    She presents again for dyspnea related to acute decompensated heart failure.  Has ANDREA again with creatinine up to 3.5.  Currently on Lasix drip.  Urine output has recorded is fairly minimal    Awake and alert.  On high flow nasal cannula but no respiratory distress    I briefly reviewed renal replacement therapy with her briefly but spoke in detail POKADY Sheppard.  We reviewed the the risks of starting dialysis and mainly related to the central venous catheter (ie sepsis and death). We also discussed that this would be permanent and likely affect quality of life for the worse.  However the benefits would be volume control    ANDREA  ADHF    Continue high-dose loop diuretics.  Will continue conversations with patient and POA pending course    Prognosis is poor due to comorbid conditions

## 2024-02-21 NOTE — PROGRESS NOTES
"Daphne Morris is a 68 y.o. female on day 2 of admission presenting with Heart failure (CMS/HCC).      Subjective   She is sitting up in the bed, drowsy. Off bipap, on HFNC.       Review of systems:  Constitutional: negative for fever, chills, or malaise  Neuro: negative for dizziness, headache, numbness, tingling  ENT: Negative for nasal congestion or sore throat  CV: negative for chest pain, palpitations  GI: negative for abd pain, nausea, vomiting or diarrhea  : negative for dysuria, frequency, or urgency  Skin: negative for lesions, wounds, or rash  Musculoskeletal: Negative for myalgia, or arthralgia  Endocrine: Negative for polyuria or polydipsia         Objective   Constitutional: Well developed, drowsy, no distress, cooperative  Eyes: PERRL, EOMI, clear sclera  ENMT: mucous membranes moist, no apparent injury, no lesions seen  Head/Neck: Neck supple, no apparent injury, thyroid without mass or tenderness, No JVD, trachea midline, no bruits  Respiratory/Thorax: Patent airways, CTAB, normal breath sounds with good chest expansion, thorax symmetric  Cardiovascular: Regular, rate and rhythm, no murmurs, 2+ equal pulses of the extremities, normal S 1and S 2  Gastrointestinal: Nondistended, soft, non-tender, no rebound tenderness or guarding, no masses palpable, no organomegaly, +BS, no bruits  Musculoskeletal: ROM intact, no joint swelling, normal strength  Extremities: generalized edema  Neurological: drowsy  Lymphatic: No significant lymphadenopathy  Skin: Warm and dry, no lesions, no rashes      Last Recorded Vitals  /76 (BP Location: Right arm, Patient Position: Lying)   Pulse 56   Temp 36.4 °C (97.5 °F) (Temporal)   Resp 21   Ht 1.676 m (5' 6\")   Wt 99.4 kg (219 lb 3.2 oz)   SpO2 96%   BMI 35.38 kg/m²     Intake/Output last 3 Shifts:  I/O last 3 completed shifts:  In: 2953.6 (29.7 mL/kg) [P.O.:1200; I.V.:32.8 (0.3 mL/kg); Blood:1720.8]  Out: 535 (5.4 mL/kg) [Urine:535 (0.1 mL/kg/hr)]  Weight: " 99.4 kg   I/O this shift:  In: 640 [P.O.:640]  Out: 350 [Urine:350]    Relevant Results  Scheduled medications  amiodarone, 200 mg, oral, Daily with breakfast  amLODIPine, 5 mg, oral, Daily  aspirin, 81 mg, oral, Daily  atorvastatin, 40 mg, oral, Daily  clopidogrel, 75 mg, oral, Daily  ferrous gluconate, 324 mg, oral, Daily with breakfast  [Held by provider] furosemide, 80 mg, oral, BID  [Held by provider] heparin (porcine), 5,000 Units, subcutaneous, q8h  [Held by provider] insulin glargine, 14 Units, subcutaneous, Nightly  insulin lispro, 0-15 Units, subcutaneous, TID with meals  ipratropium-albuteroL, 3 mL, nebulization, TID  levothyroxine, 200 mcg, oral, Daily before breakfast  lidocaine, 5 mL, infiltration, Once  melatonin, 5 mg, oral, Daily  metoprolol succinate XL, 50 mg, oral, BID  oseltamivir, 30 mg, oral, Daily  pantoprazole, 40 mg, oral, BID  polyethylene glycol, 17 g, oral, Daily  potassium chloride CR, 20 mEq, oral, Daily  potassium chloride CR, 40 mEq, oral, Daily  predniSONE, 40 mg, oral, Daily  vancomycin, 500 mg, intravenous, q24h  venlafaxine XR, 75 mg, oral, Daily      Continuous medications  furosemide, 10 mg/hr, Last Rate: 10 mg/hr (02/21/24 1207)      PRN medications  PRN medications: acetaminophen **OR** acetaminophen **OR** acetaminophen, acetaminophen **OR** acetaminophen **OR** acetaminophen, albuterol, dextrose 10 % in water (D10W), dextrose, diphenhydrAMINE, glucagon, ipratropium-albuteroL, ondansetron **OR** ondansetron, oxygen, oxygen    Results for orders placed or performed during the hospital encounter of 02/19/24 (from the past 24 hour(s))   POCT GLUCOSE   Result Value Ref Range    POCT Glucose 182 (H) 74 - 99 mg/dL   Vancomycin   Result Value Ref Range    Vancomycin 14.5 5.0 - 20.0 ug/mL   CBC and Auto Differential   Result Value Ref Range    WBC 4.4 4.4 - 11.3 x10*3/uL    nRBC 0.0 0.0 - 0.0 /100 WBCs    RBC 2.54 (L) 4.00 - 5.20 x10*6/uL    Hemoglobin 7.5 (L) 12.0 - 16.0 g/dL     Hematocrit 24.5 (L) 36.0 - 46.0 %    MCV 97 80 - 100 fL    MCH 29.5 26.0 - 34.0 pg    MCHC 30.6 (L) 32.0 - 36.0 g/dL    RDW 14.1 11.5 - 14.5 %    Platelets 223 150 - 450 x10*3/uL    Neutrophils % 84.9 40.0 - 80.0 %    Immature Granulocytes %, Automated 1.1 (H) 0.0 - 0.9 %    Lymphocytes % 9.6 13.0 - 44.0 %    Monocytes % 4.4 2.0 - 10.0 %    Eosinophils % 0.0 0.0 - 6.0 %    Basophils % 0.0 0.0 - 2.0 %    Neutrophils Absolute 3.70 1.20 - 7.70 x10*3/uL    Immature Granulocytes Absolute, Automated 0.05 0.00 - 0.70 x10*3/uL    Lymphocytes Absolute 0.42 (L) 1.20 - 4.80 x10*3/uL    Monocytes Absolute 0.19 0.10 - 1.00 x10*3/uL    Eosinophils Absolute 0.00 0.00 - 0.70 x10*3/uL    Basophils Absolute 0.00 0.00 - 0.10 x10*3/uL   Renal Function Panel   Result Value Ref Range    Glucose 179 (H) 74 - 99 mg/dL    Sodium 134 (L) 136 - 145 mmol/L    Potassium 5.0 3.5 - 5.3 mmol/L    Chloride 96 (L) 98 - 107 mmol/L    Bicarbonate 29 21 - 32 mmol/L    Anion Gap 14 10 - 20 mmol/L    Urea Nitrogen 69 (H) 6 - 23 mg/dL    Creatinine 3.51 (H) 0.50 - 1.05 mg/dL    eGFR 14 (L) >60 mL/min/1.73m*2    Calcium 7.6 (L) 8.6 - 10.3 mg/dL    Phosphorus 5.8 (H) 2.5 - 4.9 mg/dL    Albumin 2.4 (L) 3.4 - 5.0 g/dL   Magnesium   Result Value Ref Range    Magnesium 1.96 1.60 - 2.40 mg/dL   B-type natriuretic peptide   Result Value Ref Range    BNP 1,443 (H) 0 - 99 pg/mL   POCT GLUCOSE   Result Value Ref Range    POCT Glucose 184 (H) 74 - 99 mg/dL   Blood Gas Arterial Full Panel   Result Value Ref Range    POCT pH, Arterial 7.36 (L) 7.38 - 7.42 pH    POCT pCO2, Arterial 51 (H) 38 - 42 mm Hg    POCT pO2, Arterial 119 (H) 85 - 95 mm Hg    POCT SO2, Arterial 99 94 - 100 %    POCT Oxy Hemoglobin, Arterial 98.2 (H) 94.0 - 98.0 %    POCT Hematocrit Calculated, Arterial 23.0 (L) 36.0 - 46.0 %    POCT Sodium, Arterial 130 (L) 136 - 145 mmol/L    POCT Potassium, Arterial 5.1 3.5 - 5.3 mmol/L    POCT Chloride, Arterial 98 98 - 107 mmol/L    POCT Ionized Calcium,  Arterial 1.11 1.10 - 1.33 mmol/L    POCT Glucose, Arterial 176 (H) 74 - 99 mg/dL    POCT Lactate, Arterial 0.4 0.4 - 2.0 mmol/L    POCT Base Excess, Arterial 2.9 -2.0 - 3.0 mmol/L    POCT HCO3 Calculated, Arterial 28.8 (H) 22.0 - 26.0 mmol/L    POCT Hemoglobin, Arterial 7.7 (L) 12.0 - 16.0 g/dL    POCT Anion Gap, Arterial 8 (L) 10 - 25 mmo/L    Patient Temperature      FiO2 60 %    Ipap CMH2O 25.0 cm H2O    Epap CMH2O 5.0 cm H2O    Site of Arterial Puncture Radial Right     Jonathan's Test Positive    POCT GLUCOSE   Result Value Ref Range    POCT Glucose 154 (H) 74 - 99 mg/dL       XR chest 1 view   Final Result   1. Large right pleural effusion similar to recent prior studies.   2. Pulmonary venous hypertension.   3. Cardiomegaly.   4. Probable left lower lobe infiltrate or atelectasis.                  MACRO:   None        Signed by: Mario Sampson 2/21/2024 11:57 AM   Dictation workstation:   [id number]      NM Lung perfusion with spect/ct   Final Result   There are no segmental or subsegmental perfusion defects in both   lungs indicating low probability for acute pulmonary embolism.   Moderate-sized right pleural effusion.             The interpretation above is based on modified PISAPED criteria.        This study was analyzed and interpreted at [place], [place].        Signed by: Rolando Dillon 2/19/2024 10:38 AM   Dictation workstation:   [id number]      Consult to Interventional Radiology    (Results Pending)   Bedside Midline Imaging    (Results Pending)       Transthoracic Echo (TTE) Complete    Result Date: 2/5/2024   [facility], [address] [place] [address]               Tel [phone] and Fax [phone] TRANSTHORACIC ECHOCARDIOGRAM REPORT  Patient Name:      MOE MAYA      Reading Physician:    [id number] Nicholas Antonio MD Study Date:        2/5/2024             Ordering  Provider:    [id number] DOMONIQUE PURCELL MRN/PID:           [id number]             Fellow: Accession#:        [id number]         Nurse: Date of Birth/Age: 1955 / 68 years Sonographer:          Nina Angel                                                               [facility] Gender:            F                    Additional Staff: Height:            167.64 cm            Admit Date:           2/2/2024 Weight:            102.51 kg            Admission Status:     Inpatient -                                                               Routine BSA:               2.11 m2              Encounter#:           [id number]                                         Department Location:  [facility] Non                                                               Invasive Blood Pressure: 147 /65 mmHg Study Type:    TRANSTHORACIC ECHO (TTE) COMPLETE Diagnosis/ICD: Acute combined systolic (congestive) and diastolic (congestive)                heart failure (CHF)-I50.41 Indication:    Congestive Heart Failure CPT Code:      Echo Complete w Full Doppler-70193 Patient History: Diabetes:         Yes Pertinent         A-Fib, HTN, Dyspnea and LE Edema. Watchman device, elevated History:          BNP,. Study Detail: The following Echo studies were performed: 2D, M-Mode, Doppler and               color flow.  PHYSICIAN INTERPRETATION: Left Ventricle: The left ventricular systolic function is normal, with an estimated ejection fraction of 60-65%. There are no regional wall motion abnormalities. The left ventricular cavity size is normal. Spectral Doppler shows an impaired relaxation pattern of left ventricular diastolic filling. Left Atrium: The left atrium is moderately dilated. Right Ventricle: The right ventricle is mildly enlarged. There is mildly reduced right ventricular systolic function. Right Atrium: The right atrium is moderately dilated. Aortic Valve: The aortic  valve is trileaflet. There is mild aortic valve cusp calcification. There is trivial aortic valve regurgitation. The peak instantaneous gradient of the aortic valve is 14.1 mmHg. The mean gradient of the aortic valve is 9.1 mmHg. Mitral Valve: The mitral valve is normal in structure. There is mild mitral annular calcification. There is mild to moderate mitral valve regurgitation. Tricuspid Valve: The tricuspid valve is structurally normal. There is moderate tricuspid regurgitation. Pulmonic Valve: The pulmonic valve is structurally normal. There is mild pulmonic valve regurgitation. Pericardium: There is a small to moderate pericardial effusion posterior to the left ventricle. There is no evidence of cardiac tamponade. Aorta: The aortic root is normal. Pulmonary Artery: The tricuspid regurgitant velocity is 3.04 m/s, and with an estimated right atrial pressure of 3 mmHg, the estimated pulmonary artery pressure is mildly elevated with the RVSP at 39.9 mmHg. Pulmonary Veins: The pulmonary veins appear normal and return normally to the left atrium. Systemic Veins: The inferior vena cava appears to be of normal size. There is IVC inspiratory collapse greater than 50%.  CONCLUSIONS:  1. Left ventricular systolic function is normal with a 60-65% estimated ejection fraction.  2. Spectral Doppler shows an impaired relaxation pattern of left ventricular diastolic filling.  3. There is mildly reduced right ventricular systolic function.  4. The left atrium is moderately dilated.  5. The right atrium is moderately dilated.  6. There is a small to moderate pericardial effusion.  7. There is no evidence of cardiac tamponade.  8. Mild to moderate mitral valve regurgitation.  9. Moderate tricuspid regurgitation visualized. 10. Normal CVP. Estimated PASP around 45 mm Hg. QUANTITATIVE DATA SUMMARY: 2D MEASUREMENTS:                           Normal Ranges: IVSd:          1.52 cm    (0.6-1.1cm) LVPWd:         1.42 cm    (0.6-1.1cm)  LVIDd:         4.35 cm    (3.9-5.9cm) LVIDs:         2.94 cm LV Mass Index: 120.7 g/m2 LV % FS        32.3 % LA VOLUME:                              Normal Ranges: LA Vol A4C:       79.5 ml    (22+/-6mL/m2) LA Vol A2C:       78.2 ml LA Vol BP:        79.7 ml LA Vol Index A4C: 37.7ml/m2 LA Vol Index A2C: 37.1 ml/m2 LA Vol Index BP:  37.9 ml/m2 LA Volume Index:  37.8 ml/m2 LA Vol A4C:       76.0 ml LA Vol A2C:       73.9 ml RA VOLUME BY A/L METHOD:                       Normal Ranges: RA Area A4C: 17.8 cm2 M-MODE MEASUREMENTS:                  Normal Ranges: Ao Root: 3.00 cm (2.0-3.7cm) LAs:     5.08 cm (2.7-4.0cm) LV SYSTOLIC FUNCTION BY 2D PLANIMETRY (MOD):                     Normal Ranges: EF-A4C View: 63.4 % (>=55%) EF-A2C View: 63.8 % EF-Biplane:  62.5 % LV DIASTOLIC FUNCTION:                             Normal Ranges: MV Peak E:      1.33 m/s    (0.7-1.2 m/s) MV Peak A:      0.53 m/s    (0.42-0.7 m/s) E/A Ratio:      2.52        (1.0-2.2) MV e'           0.06 m/s    (>8.0) MV lateral e'   0.06 m/s MV medial e'    0.06 m/s MV A Dur:       110.73 msec E/e' Ratio:     22.09       (<8.0) PulmV Sys Mike:  53.97 cm/s PulmV Dugan Mike: 77.63 cm/s PulmV S/D Mike:  0.70 MITRAL VALVE:                 Normal Ranges: MV DT: 245 msec (150-240msec) MITRAL INSUFFICIENCY:                         Normal Ranges: MR VTI:     171.38 cm MR Vmax:    489.73 cm/s MR Volume:  20.07 ml MR Flow Rt: 57.36 ml/s MR EROA:    0.12 cm2 AORTIC VALVE:                                    Normal Ranges: AoV Vmax:                1.88 m/s  (<=1.7m/s) AoV Peak P.1 mmHg (<20mmHg) AoV Mean P.1 mmHg  (1.7-11.5mmHg) LVOT Max Mike:            0.97 m/s  (<=1.1m/s) AoV VTI:                 46.22 cm  (18-25cm) LVOT VTI:                24.89 cm LVOT Diameter:           1.96 cm   (1.8-2.4cm) AoV Area, VTI:           1.62 cm2  (2.5-5.5cm2) AoV Area,Vmax:           1.56 cm2  (2.5-4.5cm2) AoV Dimensionless Index: 0.54  RIGHT VENTRICLE:  RV Basal 3.80 cm RV Mid   2.80 cm RV Major 8.0 cm TAPSE:   18.0 mm RV s'    0.13 m/s TRICUSPID VALVE/RVSP:                             Normal Ranges: Peak TR Velocity: 3.04 m/s RV Syst Pressure: 39.9 mmHg (< 30mmHg) PULMONIC VALVE:                      Normal Ranges: PV Max Mike: 1.0 m/s  (0.6-0.9m/s) PV Max PG:  3.9 mmHg Pulmonary Veins: PulmV Dugan Mike: 77.63 cm/s PulmV S/D Mike:  0.70 PulmV Sys Mike:  53.97 cm/s  [id number] Nicholas Antonio MD Electronically signed on 2/5/2024 at 5:06:05 PM  ** Final **           Assessment/Plan   Principal Problem:    Heart failure (CMS/HCC)    1. Acute on chronic hypoxic/hypercapnic respiratory failure 2/2 acute on chronic diastolic CHF, influenza, COPD   -Isolation  -Tamiflu  -BNP 1444  -CXR showed CHF, Grossly stable pleural and parenchymal opacities in the mid and lower right hemithorax. Mild new left pleural effusion  -VQ scan showed moderate right pleural effusion  -bronchodilators  -Steroids  -oxygen/bipap support  -Significant leg to abd swelling and shortness of breath  -Strict I & Os  -Daily weights  -2gm na diet  -Echo as above  -Unable to start SGLT2 inhibitors with current GFR  -s/p RHC during last admit showed MEAN PA 30 MILD PULMONARY HTN MEAN RA 9 MM HG MEAN PCWP 23 MM HG CARDIAC OUTPUT 8.03 CI 3.79 NO SHUNTS   -Agree with Lasix gtt but most likely will need dialysis     --Still appears volume overloaded although respiratory status improving  -Monitor on tele     2. Elevated troponins-non MI elevation 2/2 influenza, respiratory distress  -Denies chest pain  -Does have shortness of breath-worsening for weeks  -Unable to have LHC due to creatinine  -Cont asa, plavix, statin, metoprolol  -Monitor on tele     3. Paroxysmal atrial fibrillation with RVR  -s/p Watchman in Aug 2023  -Currently SB/SR  -Cont amiodarone and metoprolol for rate control  -Monitor on tele     4. Hypertension  -stable  -2gm na diet  -cont meds  -monitor     5. Acute on chronic Anemia  -hgb down to  6.4, s/p PRBCs  -Hgb 7.5 today  -Monitor CBC  -Cont asa and plavix for now->hold if hgb conts to drop     6. Acute on Chronic kidney disease  -s/p nephrectomy  -Renal US negative  -Had been seen by renal during previous admit  -Creatinine worsening and lower UOP with diuresis and still volume overloaded->suggest consulted renal for possible dialysis until pt decides palliative/hospice     7. Diabetes  -ISS  -Accuchecks     8. Hypothyroidism  -Cont synthroid        Kacey Monique, APRN-CNP

## 2024-02-21 NOTE — PROGRESS NOTES
Palliative Care Social Work Note     Met with pt.  Pt is a little more alert but did nod off during the visit.  Pt states she is doing.  Pt states she wore bipap overnight and it was 'terrible'.  Currently on 8-10 L.  Discussed concern for pt's critical health.  Pt became tearful, states she wants to return home.  Advised pt that will unlikely be possible due to her care needs.  Pt asked that her  Almas Crocker, from [facility], be called   Left message  Almas  cell phone.   Pt agreeable to palliative care following up with her nephew Silver.     Two attempts to reach pt's nephew Silver.  Left message, advised nephew that palliative care NP will be following up tomorrow.

## 2024-02-21 NOTE — PROGRESS NOTES
Physical Therapy    Physical Therapy Evaluation    Patient Name: Daphne Morris  MRN: [id number]  Today's Date: 2/21/2024   Time Calculation  Start Time: 1110  Stop Time: 1125  Time Calculation (min): 15 min    Assessment/Plan   PT Assessment  PT Assessment Results: Decreased strength, Decreased endurance, Impaired balance, Decreased mobility  Rehab Prognosis: Good  Evaluation/Treatment Tolerance: Patient limited by fatigue  Medical Staff Made Aware: Yes  Strengths: Premorbid level of function  Barriers to Participation: Comorbidities, Housing layout  End of Session Communication: Bedside nurse, PCT/NA/CTA (SW)  Assessment Comment: Patient with decreased activity tolerance for sitting EOB, unable to attempt transfer at this time. Requires 8L HF O2, desat to 90% sitting EOB. Patient with multiple comorbidities limiting steady progress, will rec mod intensity at this time.  End of Session Patient Position: Bed, 3 rail up, Alarm on (B UE and LE positioned on pillows.)  IP OR SWING BED PT PLAN  Inpatient or Swing Bed: Inpatient  PT Plan  Treatment/Interventions: Bed mobility, Transfer training, Strengthening, Endurance training, Therapeutic exercise  PT Plan: Skilled PT  PT Frequency: 3 times per week  PT Discharge Recommendations: Moderate intensity level of continued care  PT Recommended Transfer Status: Assist x2  PT - OK to Discharge: Yes (per PT POC)      Subjective   General Visit Information:  General  Reason for Referral: Impaired functional mobility; (+) flu  Referred By: Myke Newsome MD  Past Medical History Relevant to Rehab: heart failure with preserved ejection fraction, chronic respiratory failure secondary to CHF/COPD/pleural effusion 2 L of oxygen at baseline, history of A-fib, hypertension, prior CVA anemia, CKD stage IV, DM type II, anemia, hypothyroidism, and pericardial effusion who presented to the ED from her nursing facility with concern of dyspnea and hypoxia, patient was recently  admitted to [facility] on 02/01-02/15 for AHRF, COPDae, ANDREA/cardiorenal syndrome she received Lasix and underwent right thoracentesis as well as diagnostic right heart cath which showed mild pulmonary hypertension  Co-Treatment: OT  Co-Treatment Reason: To maximize pt safety and outcomes  Prior to Session Communication: Bedside nurse  Patient Position Received: Bed, 3 rail up, Alarm on  General Comment: Patient pleasant, cooperative and agreeable to therapy evals  Home Living:  Home Living  Type of Home: Mobile home  Lives With: Alone  Home Adaptive Equipment: Walker rolling or standard (Rollator)  Home Layout: One level  Home Access: Stairs to enter with rails  Entrance Stairs-Rails: Both  Entrance Stairs-Number of Steps: 6  Home Living Comments: Patient most recently at SNF  Prior Level of Function:  Prior Function Per Pt/Caregiver Report  Level of [place]: Needs assistance with ADLs, Needs assistance with homemaking, Needs assistance with functional transfers  Receives Help From: Personal care attendant  ADL Assistance: Needs assistance  Homemaking Assistance: Needs assistance  Ambulatory Assistance: Needs assistance  Precautions:  Precautions  Medical Precautions: Fall precautions (8L HF O2, external cath, tele, IV, (+) droplet)  Vital Signs:  Vital Signs  Heart Rate: 58  SpO2: 90 % (Improves to 98% in supine)  BP Method: Automatic  Patient Position: Sitting    Objective   Pain:  Pain Assessment  Pain Assessment: 0-10  Pain Score: 0 - No pain  Cognition:  Cognition  Overall Cognitive Status: Within Functional Limits  Orientation Level: Disoriented to time (States Month as February but year as 2023)    General Assessments:     Activity Tolerance  Endurance: Tolerates less than 10 min exercise, no significant change in vital signs    Sensation  Light Touch: No apparent deficits    Strength  Strength Comments: B LE 3/5    Coordination  Movements are Fluid and Coordinated: Yes    Postural  Control  Postural Control: Within Functional Limits    Static Sitting Balance  Static Sitting-Balance Support: Feet supported, Bilateral upper extremity supported  Static Sitting-Level of Assistance: Minimum assistance  Static Sitting-Comment/Number of Minutes: 3' at EOB prior to requesting to lie down       Functional Assessments:  ADL  ADL's Addressed: Yes (Patient requiring assist for hygiene, max A. New purewick positioned for patient comfort)    Bed Mobility  Bed Mobility: Yes  Bed Mobility 1  Bed Mobility 1: Supine to sitting, Sitting to supine  Level of Assistance 1: Moderate assistance (x 2)  Bed Mobility Comments 1: Increased time to complete, assist B LE and trunk  Bed Mobility 2  Bed Mobility  2:  (repositioning in bed)  Level of Assistance 2: Maximum assistance (x 2)    Transfers  Transfer: No (Unable to safely complete at this time)         Outcome Measures:  [facility] Basic Mobility  Turning from your back to your side while in a flat bed without using bedrails: Total  Moving from lying on your back to sitting on the side of a flat bed without using bedrails: Total  Moving to and from bed to chair (including a wheelchair): Total  Standing up from a chair using your arms (e.g. wheelchair or bedside chair): Total  To walk in hospital room: Total  Climbing 3-5 steps with railing: Total  Basic Mobility - Total Score: 6    Encounter Problems       Encounter Problems (Active)       Balance       STG - Maintains static standing balance with upper extremity support x 1' with max A  (Progressing)       Start:  02/21/24    Expected End:  03/06/24            STG - Maintains dynamic sitting balance without upper extremity support x 10'  (Progressing)       Start:  02/21/24    Expected End:  03/06/24               Pain - Adult          Transfers       STG - Patient will perform bed mobility min A  (Progressing)       Start:  02/21/24    Expected End:  03/06/24            STG - Patient will transfer sit to and from  stand max A  (Progressing)       Start:  02/21/24    Expected End:  03/06/24                   Education Documentation  Precautions, taught by Kimberly Chamberlain, PT at 2/21/2024 11:51 AM.  Learner: Patient  Readiness: Acceptance  Method: Explanation  Response: Verbalizes Understanding, Needs Reinforcement    Body Mechanics, taught by Kimberly Chamberlain, PT at 2/21/2024 11:51 AM.  Learner: Patient  Readiness: Acceptance  Method: Explanation  Response: Verbalizes Understanding, Needs Reinforcement    Mobility Training, taught by Kimberly Chamberlain, PT at 2/21/2024 11:51 AM.  Learner: Patient  Readiness: Acceptance  Method: Explanation  Response: Verbalizes Understanding, Needs Reinforcement    Education Comments  No comments found.

## 2024-02-21 NOTE — PROGRESS NOTES
Daphne Morris is a 68 y.o. female on day 2 of admission presenting with Heart failure (CMS/HCC).      Subjective   Seen and examined, breathing is improving and the patient is feeling better today, no new complaints.  No overnight events.  The plan discussed with the patient and RN.       Objective     Last Recorded Vitals  BP (!) 108/96 (BP Location: Right arm, Patient Position: Lying)   Pulse 57   Temp 36.4 °C (97.5 °F) (Temporal)   Resp 18   Wt 99.4 kg (219 lb 3.2 oz)   SpO2 97%   Intake/Output last 3 Shifts:    Intake/Output Summary (Last 24 hours) at 2/21/2024 1057  Last data filed at 2/21/2024 0944  Gross per 24 hour   Intake 2377.73 ml   Output 475 ml   Net 1902.73 ml         Admission Weight  Weight: 95.4 kg (210 lb 5.1 oz) (02/19/24 0438)    Daily Weight  02/21/24 : 99.4 kg (219 lb 3.2 oz)    Image Results  NM Lung perfusion with spect/ct  Narrative: Interpreted By:  Rolando Dillon and Osman Sena   STUDY:  NM LUNG PERFUSION WITH SPECT/CT;  2/19/2024 9:58 am      INDICATION:  Signs/Symptoms: 68-year-old female presented with known congestive  heart failure. R/o pulmoanry embolism.      COMPARISON:  Chest x-ray from 02/18/2024      ACCESSION NUMBER(S):  [id number]      ORDERING CLINICIAN:  SARAH LEMOS      TECHNIQUE:  DIVISION OF NUCLEAR MEDICINE  PERFUSION LUNG SCANS      Multiple perfusion images of the lungs were acquired after the  intravenous administration of 4.0 mCi of Tc-99m macroaggregated  albumin (MAA). In addition,SPECT/CT of the chest was performed.      FINDINGS:  Perfusion images of both lungs demonstrate mild heterogeneity  throughout the lung fields bilaterally. There are no distinct  segmental or subsegmental perfusion defects. Fissure sign seen at  right lung compatible with right pleural effusion.      Impression: There are no segmental or subsegmental perfusion defects in both  lungs indicating low probability for acute pulmonary embolism.  Moderate-sized right pleural  effusion.          The interpretation above is based on modified PISAPED criteria.      This study was analyzed and interpreted at [place], [place].      Signed by: Rolando Dillon 2/19/2024 10:38 AM  Dictation workstation:   [id number]  ECG 12 Lead  Junctional rhythm  Left axis deviation  Inferior infarct , age undetermined  Anterolateral infarct (cited on or before 01-FEB-2024)  Abnormal ECG  When compared with ECG of 01-FEB-2024 02:46,  Junctional rhythm has replaced Sinus rhythm  Questionable change in initial forces of Lateral leads      Physical Exam  Constitutional: Well developed, awake/alert/oriented x3, no distress, alert and cooperative  Eyes: PERRL, EOMI, clear sclera  ENMT: mucous membranes moist, no apparent injury, no lesions seen  Head/Neck: Neck supple, no apparent injury, thyroid without mass or tenderness, No JVD, trachea midline, no bruits  Respiratory/Thorax: Patent airways, CTAB, normal breath sounds with good chest expansion, thorax symmetric  Cardiovascular: Regular, rate and rhythm, no murmurs, 2+ equal pulses of the extremities, normal S 1and S 2  Gastrointestinal: Nondistended, soft, non-tender, no rebound tenderness or guarding, no masses palpable, no organomegaly, +BS, no bruits  Musculoskeletal: ROM intact, no joint swelling, normal strength  Extremities: normal extremities, no cyanosis edema, contusions or wounds, no clubbing  Neurological: alert and oriented x3, intact senses, motor, response and reflexes, normal strength  Lymphatic: No significant lymphadenopathy  Psychological: Appropriate mood and behavior  Skin: Warm and dry, no lesions, no rashes  Relevant Results      Scheduled medications  amiodarone, 200 mg, oral, Daily with breakfast  amLODIPine, 5 mg, oral, Daily  aspirin, 81 mg, oral, Daily  atorvastatin, 40 mg, oral, Daily  clopidogrel, 75 mg, oral, Daily  ferrous gluconate, 324 mg, oral, Daily with breakfast  [Held by provider] furosemide, 80 mg,  oral, BID  [Held by provider] heparin (porcine), 5,000 Units, subcutaneous, q8h  [Held by provider] insulin glargine, 14 Units, subcutaneous, Nightly  insulin lispro, 0-15 Units, subcutaneous, TID with meals  ipratropium-albuteroL, 3 mL, nebulization, TID  levothyroxine, 200 mcg, oral, Daily before breakfast  lidocaine, 5 mL, infiltration, Once  melatonin, 5 mg, oral, Daily  metoprolol succinate XL, 50 mg, oral, BID  oseltamivir, 30 mg, oral, Daily  pantoprazole, 40 mg, oral, BID  polyethylene glycol, 17 g, oral, Daily  potassium chloride CR, 20 mEq, oral, Daily  potassium chloride CR, 40 mEq, oral, Daily  predniSONE, 40 mg, oral, Daily  vancomycin, 500 mg, intravenous, q24h  venlafaxine XR, 75 mg, oral, Daily      Continuous medications  furosemide, 10 mg/hr, Last Rate: 10 mg/hr (02/21/24 1012)      PRN medications  PRN medications: acetaminophen **OR** acetaminophen **OR** acetaminophen, acetaminophen **OR** acetaminophen **OR** acetaminophen, albuterol, dextrose 10 % in water (D10W), dextrose, diphenhydrAMINE, glucagon, ipratropium-albuteroL, ondansetron **OR** ondansetron, oxygen, oxygen          Results for orders placed or performed during the hospital encounter of 02/19/24 (from the past 24 hour(s))   Occult Blood, Stool    Specimen: Stool   Result Value Ref Range    Occult Blood, Stool X1 Positive (A) Negative   CBC   Result Value Ref Range    WBC 6.7 4.4 - 11.3 x10*3/uL    nRBC 0.0 0.0 - 0.0 /100 WBCs    RBC 2.63 (L) 4.00 - 5.20 x10*6/uL    Hemoglobin 7.8 (L) 12.0 - 16.0 g/dL    Hematocrit 26.2 (L) 36.0 - 46.0 %     80 - 100 fL    MCH 29.7 26.0 - 34.0 pg    MCHC 29.8 (L) 32.0 - 36.0 g/dL    RDW 14.7 (H) 11.5 - 14.5 %    Platelets 222 150 - 450 x10*3/uL   POCT GLUCOSE   Result Value Ref Range    POCT Glucose 139 (H) 74 - 99 mg/dL   POCT GLUCOSE   Result Value Ref Range    POCT Glucose 182 (H) 74 - 99 mg/dL   Vancomycin   Result Value Ref Range    Vancomycin 14.5 5.0 - 20.0 ug/mL   CBC and Auto  Differential   Result Value Ref Range    WBC 4.4 4.4 - 11.3 x10*3/uL    nRBC 0.0 0.0 - 0.0 /100 WBCs    RBC 2.54 (L) 4.00 - 5.20 x10*6/uL    Hemoglobin 7.5 (L) 12.0 - 16.0 g/dL    Hematocrit 24.5 (L) 36.0 - 46.0 %    MCV 97 80 - 100 fL    MCH 29.5 26.0 - 34.0 pg    MCHC 30.6 (L) 32.0 - 36.0 g/dL    RDW 14.1 11.5 - 14.5 %    Platelets 223 150 - 450 x10*3/uL    Neutrophils % 84.9 40.0 - 80.0 %    Immature Granulocytes %, Automated 1.1 (H) 0.0 - 0.9 %    Lymphocytes % 9.6 13.0 - 44.0 %    Monocytes % 4.4 2.0 - 10.0 %    Eosinophils % 0.0 0.0 - 6.0 %    Basophils % 0.0 0.0 - 2.0 %    Neutrophils Absolute 3.70 1.20 - 7.70 x10*3/uL    Immature Granulocytes Absolute, Automated 0.05 0.00 - 0.70 x10*3/uL    Lymphocytes Absolute 0.42 (L) 1.20 - 4.80 x10*3/uL    Monocytes Absolute 0.19 0.10 - 1.00 x10*3/uL    Eosinophils Absolute 0.00 0.00 - 0.70 x10*3/uL    Basophils Absolute 0.00 0.00 - 0.10 x10*3/uL   Renal Function Panel   Result Value Ref Range    Glucose 179 (H) 74 - 99 mg/dL    Sodium 134 (L) 136 - 145 mmol/L    Potassium 5.0 3.5 - 5.3 mmol/L    Chloride 96 (L) 98 - 107 mmol/L    Bicarbonate 29 21 - 32 mmol/L    Anion Gap 14 10 - 20 mmol/L    Urea Nitrogen 69 (H) 6 - 23 mg/dL    Creatinine 3.51 (H) 0.50 - 1.05 mg/dL    eGFR 14 (L) >60 mL/min/1.73m*2    Calcium 7.6 (L) 8.6 - 10.3 mg/dL    Phosphorus 5.8 (H) 2.5 - 4.9 mg/dL    Albumin 2.4 (L) 3.4 - 5.0 g/dL   Magnesium   Result Value Ref Range    Magnesium 1.96 1.60 - 2.40 mg/dL   B-type natriuretic peptide   Result Value Ref Range    BNP 1,443 (H) 0 - 99 pg/mL   POCT GLUCOSE   Result Value Ref Range    POCT Glucose 184 (H) 74 - 99 mg/dL   Blood Gas Arterial Full Panel   Result Value Ref Range    POCT pH, Arterial 7.36 (L) 7.38 - 7.42 pH    POCT pCO2, Arterial 51 (H) 38 - 42 mm Hg    POCT pO2, Arterial 119 (H) 85 - 95 mm Hg    POCT SO2, Arterial 99 94 - 100 %    POCT Oxy Hemoglobin, Arterial 98.2 (H) 94.0 - 98.0 %    POCT Hematocrit Calculated, Arterial 23.0 (L) 36.0 -  46.0 %    POCT Sodium, Arterial 130 (L) 136 - 145 mmol/L    POCT Potassium, Arterial 5.1 3.5 - 5.3 mmol/L    POCT Chloride, Arterial 98 98 - 107 mmol/L    POCT Ionized Calcium, Arterial 1.11 1.10 - 1.33 mmol/L    POCT Glucose, Arterial 176 (H) 74 - 99 mg/dL    POCT Lactate, Arterial 0.4 0.4 - 2.0 mmol/L    POCT Base Excess, Arterial 2.9 -2.0 - 3.0 mmol/L    POCT HCO3 Calculated, Arterial 28.8 (H) 22.0 - 26.0 mmol/L    POCT Hemoglobin, Arterial 7.7 (L) 12.0 - 16.0 g/dL    POCT Anion Gap, Arterial 8 (L) 10 - 25 mmo/L    Patient Temperature      FiO2 60 %    Ipap CMH2O 25.0 cm H2O    Epap CMH2O 5.0 cm H2O    Site of Arterial Puncture Radial Right     Jonathan's Test Positive       NM Lung perfusion with spect/ct    Result Date: 2/19/2024  Interpreted By:  Rolando Dillon and Osman Sena STUDY: NM LUNG PERFUSION WITH SPECT/CT;  2/19/2024 9:58 am   INDICATION: Signs/Symptoms: 68-year-old female presented with known congestive heart failure. R/o pulmoanry embolism.   COMPARISON: Chest x-ray from 02/18/2024   ACCESSION NUMBER(S): [id number]   ORDERING CLINICIAN: SARAH LEMOS   TECHNIQUE: DIVISION OF NUCLEAR MEDICINE PERFUSION LUNG SCANS   Multiple perfusion images of the lungs were acquired after the intravenous administration of 4.0 mCi of Tc-99m macroaggregated albumin (MAA). In addition,SPECT/CT of the chest was performed.   FINDINGS: Perfusion images of both lungs demonstrate mild heterogeneity throughout the lung fields bilaterally. There are no distinct segmental or subsegmental perfusion defects. Fissure sign seen at right lung compatible with right pleural effusion.       There are no segmental or subsegmental perfusion defects in both lungs indicating low probability for acute pulmonary embolism. Moderate-sized right pleural effusion.     The interpretation above is based on modified PISAPED criteria.   This study was analyzed and interpreted at [place], [place].   Signed by: Rolando Dillon  2/19/2024 10:38 AM Dictation workstation:   [id number]    ECG 12 Lead    Result Date: 2/19/2024  Junctional rhythm Left axis deviation Inferior infarct , age undetermined Anterolateral infarct (cited on or before 01-FEB-2024) Abnormal ECG When compared with ECG of 01-FEB-2024 02:46, Junctional rhythm has replaced Sinus rhythm Questionable change in initial forces of Lateral leads    Vascular US lower extremity venous duplex bilateral    Result Date: 2/18/2024  Interpreted By:  Washington Peraza, STUDY: [facility] US LOWER EXTREMITY VENOUS DUPLEX BILATERAL  2/18/2024 8:09 pm   INDICATION: 67 y/o   F with  Signs/Symptoms:d dimer > 13,000. LMP:  Unknown.   COMPARISON: None.   ACCESSION NUMBER(S): [id number]   ORDERING CLINICIAN: RAINER BARR   TECHNIQUE: Routine ultrasound of the  bilateral lower extremity was performed with duplex Doppler (color and spectral) evaluation.   Static images were obtained for remote interpretation.   FINDINGS: THIGH VEINS:  The common femoral, femoral, popliteal, proximal medial saphenous, and deep femoral veins are patent and free of thrombus. The veins are normally compressible.  They demonstrate normal phasic flow and augmentation response.   CALF VEINS: Significant soft tissue swelling somewhat limits assessment.       Negative study.  No deep venous thrombosis of the  bilateral lower extremity.  Soft tissue swelling limits assessment   MACRO: None   Signed by: Washington Peraza 2/18/2024 8:41 PM Dictation workstation:   [id number]    XR chest 1 view    Result Date: 2/18/2024  Interpreted By:  Erlin Lui, STUDY: XR CHEST 1 VIEW;  2/18/2024 4:32 pm   INDICATION: Signs/Symptoms:Dyspnea history of CHF.   COMPARISON: CT scan from 02/01/2024.   ACCESSION NUMBER(S): [id number]   ORDERING CLINICIAN: RAINER BARR   TECHNIQUE: Single AP portable view of the chest was obtained.   FINDINGS: MEDIASTINUM/ LUNGS/ TAMMIE: Cardiomegaly. Pulmonary vasculature and interstitium are prominent and  indistinct. Slight new pleural and parenchymal opacities at the lateral left lung base. Persistent pleural and parenchymal opacities in the mid and lower right hemithorax. These are grossly stable. Stable calcification in the aorta.   No pneumothorax. No tracheal deviation. No abnormal hilar fullness or gross mass on either side.   BONES: No lytic or blastic destructive bone lesion.  There is mild-to-moderate disc space narrowing and endplate osteophytosis throughout the thoracic spine.   UPPER ABDOMEN: Grossly intact.       Findings of ongoing CHF.   Grossly stable pleural and parenchymal opacities in the mid and lower right hemithorax. Mild new left pleural effusion with left basilar atelectasis or edema.   MACRO: None   Signed by: Erlin Lui 2/18/2024 4:59 PM Dictation workstation:   [id number]    XR chest 2 views    Result Date: 2/14/2024  Interpreted By:  Amando Starks, STUDY: XR CHEST 2 VIEWS; 2/13/2024 8:25 am   INDICATION: Signs/Symptoms:pleural effusion   COMPARISON: Radiographs 02/09/2024   ACCESSION NUMBER(S): [id number]   ORDERING CLINICIAN: TONIA MONTANA   TECHNIQUE: Frontal and lateral radiographs of the chest were obtained.   FINDINGS: LINES AND DEVICES: None.   LUNGS: Stable moderate to large right pleural effusion with adjacent atelectasis. No new focal consolidation, left pleural effusion or pneumothorax.   CARDIOMEDIASTINAL SILHOUETTE: Partially obscured right heart border by adjacent effusion.   OTHER: No acute osseous abnormality.       Stable moderate to large right pleural effusion.   MACRO None   Signed by: Amando Starks 2/14/2024 9:50 AM Dictation workstation:   [id number]    XR chest 1 view    Result Date: 2/9/2024  Interpreted By:  Sukumar Daugherty, STUDY: XR CHEST 1 VIEW;  2/9/2024 1:13 pm   INDICATION: Signs/Symptoms:fever.   COMPARISON: 02/02/2024   ACCESSION NUMBER(S): [id number]   ORDERING CLINICIAN: TONIA MONTANA   FINDINGS: Significantly increased large right pleural  effusion with overlying atelectasis. Left lung grossly clear. Cardiomediastinal silhouette unchanged. Aortic atherosclerosis. Pulmonary vascular congestion.       Large right pleural effusion, significantly increased compared to 1 week ago.     Signed by: Sukumar Daugherty 2/9/2024 3:56 PM Dictation workstation:   [id number]    Cardiac catheterization - non-coronary    Result Date: 2/7/2024   [facility], Cath Lab, [address] [place] [address] Cardiovascular Catheterization Report Patient Name:      MOE MAYA      Performing Physician:  [id number] Nicholas Antonio MD Study Date:        2/7/2024             Verifying Physician:   Tobin Antonio MD MRN/PID:           [id number]             Cardiologist/Co-scrub: Accession#:        [id number]         Ordering Physician:    [id number] DOMONIQUE PURCELL Date of Birth/Age: 1955 / 68 years Fellow: Gender:            F                    Fellow: Encounter#:        [id number]  Study: Right Heart Cath  Indications: MOE MAYA is a 69 year old female who presents with dyslipidemia, hypertension, renal failure, atrial fibrillation, diabetes, peripheral artery disease, chronic pulmonary disease, obesity and Tobacco Use - Current. Heart failure. Pulmonary hypertension and heart failure.  Procedure Description: After infiltration of local anesthetic, the right internal jugular vein was identified with two-dimensional ultrasound. Under direct ultrasound visualization, the right internal jugular vein was cannulated with a micropuncture technique. A 7 Vietnamese sheath was placed in the vein. A balloon tipped catheter was advanced through the right heart to record pressures. Cardiac output was calculated via the Steph method.  Right Heart Catheterization: A  balloon tipped catheter was advanced through the right heart to record pressures. Cardiac output was calculated via the Steph method. Elevated left sided filling pressures with normal cardiac output. Cardiac output is normal. Preserved cardiac output at rest. No evidence of shunt. Elevated pulmonary vascular resistance. Elevated systemic vascular resistance. Pulmonary venous hypertension and pulmonary arterial hypertension.  Hemo Personnel: +-------------------------+---------+ Name                     Duty      +-------------------------+---------+ Nicholas Antonio MD 1 +-------------------------+---------+  +----------+ Contrast:  +----------+ Omnipaque: +----------+  Hemodynamic Pressures:  +----+---------+----------+------------+-------------+---+-----+-------+-------+ SiteDate TimePhase Name  Systolic    Diastolic  ED Mean A-Wave V-Wave                             mmHg        mmHg     mmHmmHg  mmHg   mmHg                                                    g                     +----+---------+----------+------------+-------------+---+-----+-------+-------+   RA 2/7/2024  AIR REST                                9     13     11       2:16:48                                                                     PM                                                          +----+---------+----------+------------+-------------+---+-----+-------+-------+   RV 2/7/2024  AIR REST          56            3 14                          2:17:18                                                                     PM                                                          +----+---------+----------+------------+-------------+---+-----+-------+-------+   PA 2/7/2024  AIR REST          55           15      30                     2:21:53                                                                      PM                                                          +----+---------+----------+------------+-------------+---+-----+-------+-------+   PW 2/7/2024  AIR REST                               23     20     35       2:23:01                                                                     PM                                                          +----+---------+----------+------------+-------------+---+-----+-------+-------+   PW 2/7/2024  AIR REST                               23     20     38       2:23:28                                                                     PM                                                          +----+---------+----------+------------+-------------+---+-----+-------+-------+   PA 2/7/2024  AIR REST          57           15      33                     2:24:12                                                                     PM                                                          +----+---------+----------+------------+-------------+---+-----+-------+-------+   AO 2/7/2024  AIR REST         176           62      -2                     2:28:01                                                                     PM                                                          +----+---------+----------+------------+-------------+---+-----+-------+-------+  Oxygen Saturation %: +-----------+----------+------------+ Sample SiteO2 Sat (%)HB (g/100ml) +-----------+----------+------------+          FA        94         8.6 +-----------+----------+------------+          RA        64         8.6 +-----------+----------+------------+          FA        94         8.6 +-----------+----------+------------+          PA        60         8.6 +-----------+----------+------------+  Cardiac Outputs:  +---------------+------------------+-------+ ISABELLE CO (l/min)ISABELLE CI (l/min/m2)ISABELLE SV +---------------+------------------+-------+             8.0               3.8  138.5 +---------------+------------------+-------+  Vascular Resistance Calculated Values (Wood Units): +-----+---+----+---+----+ PhasePVRPVRITPRTPRI +-----+---+----+---+----+ 0    1.02.1 4.29.0  +-----+---+----+---+----+  Complications: No in-lab complications observed.  Cardiac Cath Post Procedure Notes: Post Procedure           Moderately elevated PCWP, normal cardiac output, no Diagnosis:               shunts. Blood Loss:              Estimated blood loss during the procedure was 0 mls. Specimens Removed:       Number of specimen(s) removed: none.  Recommendations: Maximize medical therapy. Agressive risk factor modification efforts. Optimize volume status with diuretics. Renal replacement therapy. Patient counseled and advised to discontinue smoking. ____________________________________________________________________________________ CONCLUSIONS:  1. RHC shows moderate pulmonary HTN, mPAP 30-33 mm Hg, mRA 9 mm Hg, normal cardiac output and no shunts.  2. Moderately elevated PCWP. ICD 10 Codes: Acute diastolic (congestive) heart failure (CHF)-I50.31  CPT Codes: Right Heart Cath O2/Cardiac output without biopsy (RHC)-56968; Moderate Sedation Services 1st additional 15 minutes patient >5 years-12560; [place] Armaan-[id number]  [id number] Nicholas Antonio MD Performing Physician Electronically signed by [id number] Nicholas Antonio MD on 2/7/2024 at 5:02:34 PM  ** Final **     Transthoracic Echo (TTE) Complete    Result Date: 2/5/2024   [facility], [address] [place] [address]               Tel [phone] and Fax [phone] TRANSTHORACIC ECHOCARDIOGRAM REPORT  Patient Name:      MOE MAYA      Reading Physician:    [id number] Nicholas Antonio MD  Study Date:        2/5/2024             Ordering Provider:    [id number] DOMONIQUE PURCELL MRN/PID:           [id number]             Fellow: Accession#:        [id number]         Nurse: Date of Birth/Age: 1955 / 68 years Sonographer:          Nina Angel                                                               RDCS Gender:            F                    Additional Staff: Height:            167.64 cm            Admit Date:           2/2/2024 Weight:            102.51 kg            Admission Status:     Inpatient -                                                               Routine BSA:               2.11 m2              Encounter#:           [id number]                                         Department Location:  [facility]                                                               Invasive Blood Pressure: 147 /65 mmHg Study Type:    TRANSTHORACIC ECHO (TTE) COMPLETE Diagnosis/ICD: Acute combined systolic (congestive) and diastolic (congestive)                heart failure (CHF)-I50.41 Indication:    Congestive Heart Failure CPT Code:      Echo Complete w Full Doppler-26430 Patient History: Diabetes:         Yes Pertinent         A-Fib, HTN, Dyspnea and LE Edema. Watchman device, elevated History:          BNP,. Study Detail: The following Echo studies were performed: 2D, M-Mode, Doppler and               color flow.  PHYSICIAN INTERPRETATION: Left Ventricle: The left ventricular systolic function is normal, with an estimated ejection fraction of 60-65%. There are no regional wall motion abnormalities. The left ventricular cavity size is normal. Spectral Doppler shows an impaired relaxation pattern of left ventricular diastolic filling. Left Atrium: The left atrium is moderately dilated. Right Ventricle: The right ventricle is mildly enlarged. There is mildly reduced right ventricular systolic function. Right Atrium: The right atrium is  moderately dilated. Aortic Valve: The aortic valve is trileaflet. There is mild aortic valve cusp calcification. There is trivial aortic valve regurgitation. The peak instantaneous gradient of the aortic valve is 14.1 mmHg. The mean gradient of the aortic valve is 9.1 mmHg. Mitral Valve: The mitral valve is normal in structure. There is mild mitral annular calcification. There is mild to moderate mitral valve regurgitation. Tricuspid Valve: The tricuspid valve is structurally normal. There is moderate tricuspid regurgitation. Pulmonic Valve: The pulmonic valve is structurally normal. There is mild pulmonic valve regurgitation. Pericardium: There is a small to moderate pericardial effusion posterior to the left ventricle. There is no evidence of cardiac tamponade. Aorta: The aortic root is normal. Pulmonary Artery: The tricuspid regurgitant velocity is 3.04 m/s, and with an estimated right atrial pressure of 3 mmHg, the estimated pulmonary artery pressure is mildly elevated with the RVSP at 39.9 mmHg. Pulmonary Veins: The pulmonary veins appear normal and return normally to the left atrium. Systemic Veins: The inferior vena cava appears to be of normal size. There is IVC inspiratory collapse greater than 50%.  CONCLUSIONS:  1. Left ventricular systolic function is normal with a 60-65% estimated ejection fraction.  2. Spectral Doppler shows an impaired relaxation pattern of left ventricular diastolic filling.  3. There is mildly reduced right ventricular systolic function.  4. The left atrium is moderately dilated.  5. The right atrium is moderately dilated.  6. There is a small to moderate pericardial effusion.  7. There is no evidence of cardiac tamponade.  8. Mild to moderate mitral valve regurgitation.  9. Moderate tricuspid regurgitation visualized. 10. Normal CVP. Estimated PASP around 45 mm Hg. QUANTITATIVE DATA SUMMARY: 2D MEASUREMENTS:                           Normal Ranges: IVSd:          1.52 cm     (0.6-1.1cm) LVPWd:         1.42 cm    (0.6-1.1cm) LVIDd:         4.35 cm    (3.9-5.9cm) LVIDs:         2.94 cm LV Mass Index: 120.7 g/m2 LV % FS        32.3 % LA VOLUME:                              Normal Ranges: LA Vol A4C:       79.5 ml    (22+/-6mL/m2) LA Vol A2C:       78.2 ml LA Vol BP:        79.7 ml LA Vol Index A4C: 37.7ml/m2 LA Vol Index A2C: 37.1 ml/m2 LA Vol Index BP:  37.9 ml/m2 LA Volume Index:  37.8 ml/m2 LA Vol A4C:       76.0 ml LA Vol A2C:       73.9 ml RA VOLUME BY A/L METHOD:                       Normal Ranges: RA Area A4C: 17.8 cm2 M-MODE MEASUREMENTS:                  Normal Ranges: Ao Root: 3.00 cm (2.0-3.7cm) LAs:     5.08 cm (2.7-4.0cm) LV SYSTOLIC FUNCTION BY 2D PLANIMETRY (MOD):                     Normal Ranges: EF-A4C View: 63.4 % (>=55%) EF-A2C View: 63.8 % EF-Biplane:  62.5 % LV DIASTOLIC FUNCTION:                             Normal Ranges: MV Peak E:      1.33 m/s    (0.7-1.2 m/s) MV Peak A:      0.53 m/s    (0.42-0.7 m/s) E/A Ratio:      2.52        (1.0-2.2) MV e'           0.06 m/s    (>8.0) MV lateral e'   0.06 m/s MV medial e'    0.06 m/s MV A Dur:       110.73 msec E/e' Ratio:     22.09       (<8.0) PulmV Sys Mike:  53.97 cm/s PulmV Dugan Mike: 77.63 cm/s PulmV S/D Mike:  0.70 MITRAL VALVE:                 Normal Ranges: MV DT: 245 msec (150-240msec) MITRAL INSUFFICIENCY:                         Normal Ranges: MR VTI:     171.38 cm MR Vmax:    489.73 cm/s MR Volume:  20.07 ml MR Flow Rt: 57.36 ml/s MR EROA:    0.12 cm2 AORTIC VALVE:                                    Normal Ranges: AoV Vmax:                1.88 m/s  (<=1.7m/s) AoV Peak P.1 mmHg (<20mmHg) AoV Mean P.1 mmHg  (1.7-11.5mmHg) LVOT Max Mike:            0.97 m/s  (<=1.1m/s) AoV VTI:                 46.22 cm  (18-25cm) LVOT VTI:                24.89 cm LVOT Diameter:           1.96 cm   (1.8-2.4cm) AoV Area, VTI:           1.62 cm2  (2.5-5.5cm2) AoV Area,Vmax:           1.56 cm2   (2.5-4.5cm2) AoV Dimensionless Index: 0.54  RIGHT VENTRICLE: RV Basal 3.80 cm RV Mid   2.80 cm RV Major 8.0 cm TAPSE:   18.0 mm RV s'    0.13 m/s TRICUSPID VALVE/RVSP:                             Normal Ranges: Peak TR Velocity: 3.04 m/s RV Syst Pressure: 39.9 mmHg (< 30mmHg) PULMONIC VALVE:                      Normal Ranges: PV Max Mike: 1.0 m/s  (0.6-0.9m/s) PV Max PG:  3.9 mmHg Pulmonary Veins: PulmV Dugan Mike: 77.63 cm/s PulmV S/D Mike:  0.70 PulmV Sys Mike:  53.97 cm/s  [id number] Nicholas Antonio MD Electronically signed on 2/5/2024 at 5:06:05 PM  ** Final **     XR knee left 4+ views    Result Date: 2/2/2024  Interpreted By:  Adama Jonas, STUDY: Left knee dated  2/2/2024.   INDICATION: Signs/Symptoms:left kne pain   COMPARISON: Radiographs dated 10/23/2018.   ACCESSION NUMBER(S): [id number]   ORDERING CLINICIAN: JOSUÉ REICH   TECHNIQUE: Four views of the left knee.   FINDINGS: No fracture or dislocation is evident.  There is a small knee joint effusion. There is moderate tricompartmental degenerative change of the knee. Reticular increased density is seen in the subcutaneous tissues.       1. Small joint effusion and degenerative change of the knee without osseous injury evident. Knowledge of any trauma may be helpful. If there is persistent concern for osseous injury, cross-sectional imaging can be considered. 2. Reticular increased density in the subcutaneous tissues which may represent lymphedema and/or cellulitis.   MACRO: None   Signed by: Adama Jonas 2/2/2024 4:24 PM Dictation workstation:   [id number]    XR chest 1 view    Result Date: 2/2/2024  Interpreted By:  Rayna Carson, STUDY: XR CHEST 1 VIEW;  2/2/2024 2:29 pm   INDICATION: Signs/Symptoms:S/p thoracentesis.   COMPARISON: 02/01/2024   ACCESSION NUMBER(S): [id number]   ORDERING CLINICIAN: AIDAN HERNANDEZ   TECHNIQUE: Portable AP upright   FINDINGS: The cardiac silhouette is enlarged but unchanged. Aorta is atherosclerotic. There is  marked improvement in the right pleural effusion since the prior study as a result of interval thoracentesis. No pneumothorax is present. There is minimal fluid residual blunting the costophrenic angle. There is minimal atelectasis at the right lung base. Pulmonary vasculature appears congested. No interval change is noted in the osseous structures.       Marked interval improvement in the right pleural effusion with interval thoracentesis. No pneumothorax.   Pulmonary vascular congestion   Minimal atelectasis right lung base   MACRO: None.   Signed by: Rayna Carson 2/2/2024 3:23 PM Dictation workstation:   [id number]    US renal complete    Result Date: 2/2/2024  Interpreted By:  Amando Starks, STUDY: US RENAL COMPLETE; 2/2/2024 12:23 pm   INDICATION: Signs/Symptoms:ANDREA on CKD.   COMPARISON: Renal ultrasound 05/01/2023   ACCESSION NUMBER(S): [id number]   ORDERING CLINICIAN: JESSICA EPPS   TECHNIQUE: Sonography of the kidneys and urinary bladder was performed.   FINDINGS: Right Kidney: Right total nephrectomy.   Left Kidney: *Renal length: 10.6 cm *Parenchyma: Normal parenchymal echogenicity. Normal parenchymal thickness. *Collecting system: No hydronephrosis. *Calculus: No echogenic, shadowing calculus. *Lesion: None.   Bladder: Normal sonographic appearance.       No left hydronephrosis. Right total nephrectomy.   MACRO: None   Signed by: Amando Starks 2/2/2024 2:36 PM Dictation workstation:   [id number]    ECG 12 lead    Result Date: 2/1/2024  Sinus bradycardia Left axis deviation Low voltage QRS Possible Anterolateral infarct , age undetermined Abnormal ECG When compared with ECG of 19-JUL-2023 14:12, Previous ECG has undetermined rhythm, needs review Left bundle branch block is no longer Present Borderline criteria for Anterior infarct are now Present Borderline criteria for Anterolateral infarct are now Present See ED provider note for full interpretation and clinical correlation Confirmed by  Wanda Juan ([id number]) on 2/1/2024 10:32:50 AM    CT chest wo IV contrast    Result Date: 2/1/2024  Interpreted By:  Beulah Chacon, STUDY: CT CHEST WO IV CONTRAST;  2/1/2024 6:40 am   INDICATION: Signs/Symptoms:sob   COMPARISON: Chest x-ray 02/01/2024. CT chest 05/04/2021   ACCESSION NUMBER(S): [id number]   ORDERING CLINICIAN: BHARGAV SWEET   TECHNIQUE: Axial CT images were obtained through the chest with no intravenous contrast administration.  Coronal and sagittal reformats were performed.   FINDINGS: There is motion artifact.   HEART AND VESSELS: No thoracic aortic aneurysm. Atherosclerotic calcifications are noted at the thoracic aorta. The main pulmonary artery is dilated. The heart is enlarged.   There is a small pericardial effusion. Status post left atrial appendage occlusion device placement.   MEDIASTINUM AND TAMMIE, LOWER NECK AND AXILLA: There is a 1.1 cm peripherally calcified nodule at the left lobe of the thyroid gland.   No obvious pathologically enlarged lymph nodes on unenhanced CT.   LUNGS AND AIRWAYS: The trachea and central airways are patent.   The evaluation of the lungs is degraded by motion artifact. There is a large right pleural effusion with complete collapse of the right lower lobe. Atelectasis/consolidation at right upper lobe and right middle lobe. Tree-in-bud airspace opacities at the right upper lobe and left lower lobe. There is mild atelectasis at the left lower lobe. No left pleural effusion. No pneumothorax.   UPPER ABDOMEN: Motion artifact. No obvious acute findings.   CHEST WALL AND OSSEOUS STRUCTURES: There is soft tissue edema at the chest wall and visualized abdominal wall. The evaluation for acute fracture is degraded by motion artifact. Multilevel degenerative changes of the spine.       1. Study degraded by motion artifact. Large right pleural effusion with complete collapse of the right lower lobe. Atelectasis/consolidation at the right upper lobe and right  middle lobe. Follow-up CT after treatment recommended to ensure complete resolution and exclude underlying mass. 2. Tree-in-bud airspace opacities at the right upper lobe and left lower lobe, may be seen with acute infection/inflammation of the smaller airways such as bronchiolitis or bronchopneumonia. 3. Mild atelectasis at the left lower lobe. 4. Cardiomegaly. Small pericardial effusion. Dilated main pulmonary artery, please correlate for pulmonary arterial hypertension. 5. 1.1 cm peripherally calcified nodule at the left lobe of the thyroid gland. This can be further characterized with nonemergent thyroid ultrasound. 6. Soft tissue edema at the chest wall and visualized abdominal wall.         MACRO:   Critical Finding:  See findings. Notification was initiated on 2/1/2024 at 6:50 am by  Beulah Chacon.  (**-YCF-**) Instructions:   Signed by: Beulah Chacon 2/1/2024 6:56 AM Dictation workstation:   [id number]    XR chest 1 view    Result Date: 2/1/2024  Interpreted By:  Lesley Dooley, STUDY: XR CHEST 1 VIEW;  2/1/2024 3:03 am   INDICATION: Signs/Symptoms:sob.   COMPARISON: 07/19/2023   ACCESSION NUMBER(S): [id number]   ORDERING CLINICIAN: BHARGAV SWEET   FINDINGS:     CARDIOMEDIASTINAL SILHOUETTE: Stable cardiomegaly. Vague density overlying the upper cardiac silhouette may represent a left atrial appendage closure device.   LUNGS: Opacity involving the mid to lower right thorax, likely combination of large right pleural effusion and atelectasis. No pneumothorax.   ABDOMEN: No remarkable upper abdominal findings.   BONES: No acute osseous abnormality.       New large right pleural effusion and basilar atelectasis. Underlying pneumonia is not excluded in the appropriate clinical setting.   MACRO: None   Signed by: Lesley Dooley 2/1/2024 3:19 AM Dictation workstation:   [id number]      Assessment/Plan        Principal Problem:    Heart failure (CMS/HCC)      Daphne Morris is a 68 y.o. female presenting  with past medical history of heart failure, atrial fibrillation, Hypertension, type 2 DM transfer from [facility] due to acute hypoxic respiratory failure secondary to pneumonia, COPD exacerbation and acute diastolic heart failure.     #Acute hypoxic respiratory failure secondary to acute on chronic diastolic heart failure and influenza B pneumonia  -Seen and examined  -Hemodynamically stable satting well on HFNC  -Labs showed hemoglobin of 6.4>>7.4, serum creatinine of 3.2>> 3.5  -Echocardiogram 2/05/24/revealed LVEF of 60-65%, impaired relaxation, mod dilated atria  -Right heart cath February 7, 2024 revealed moderate pulmonary hypertension, mPAP of 30 to 33 mmHg, mRA 9 mmHg, normal cardiac output, moderately elevated PCWP  -BNP 1444  --Chest x-ray with stable pleural and parenchymal opacities and new left pleural effusion with left basilar atelectasis or edema  Continue BiPAP 22/5m wean O2 as tolerated /  on O2 at 2l/min at baseline  We will start Lasix drip and cardiac diet with 1.5 L fluid restriction  Continue renally dosed Tamiflu  Strict I's and O's and cardiac diet  -Pulmonary on board, patient also on prednisone, Tamiflu, If no improvement ro consider thoracentesis  -Cardiology on board, unable to do left heart cath due to elevated creatinine, unable to start SGLT2 inhibitors as well  -We will place a Lasix drip on hold today given kidney improvement and will get ABGs tomorrow to reassess patient;s acid-base status    #Anemia  -Status post 1 unit of blood yesterday  -FOBT positive  -A iron saturation of 11 iron level of 24 ferritin in 200  -Bleeding precautions  -Will consider iron supplements on discharge  -Will consider GI consult    #D-Dimer 13,011  Possibly related to flu  Ultrasound ruled out DVT  VQ scan low probability of PE     #Lower extremity cellulitis  To diuresis, leg elevation  Continue vancomycin     #Elevated troponin  Suspect demand ischemia  EKG with no significant changes from prior  EKG  Continue aspirin, Plavix, statin, metoprolol  Cardiology following     #Stage III/IV chronic kidney disease  Status post right nephrectomy  Renal ultrasound negative  Renal function seems to be near baseline  Lasix drip on hold  Consider nephrology consult if kidney function becomes worse    COPD  Does not appear to be in acute exacerbation  Continue DuoNebs       Paroxysmal atrial fibrillation  Status post watchman in August 2023  Currently in sinus rhythm  Continue amiodarone and metoprolol     Type 2 diabetes mellitus  Continue insulin sliding scale  Holding Lantus due to limited oral intake  Holding Januvia  Continue diabetic diet  Follow Accu-Cheks     Hypertension  Cardiac diet  Neurovascular metoprolol continued  Monitor vitals     GERD  Continue PPI     Hyperlipidemia  Continue atorvastatin     Depression  Continue venlafaxine     Hypothyroidism  Continue Synthroid     DVT prophylaxis  Heparin                    Myke Newsome MD

## 2024-02-21 NOTE — PROGRESS NOTES
"Vancomycin Dosing by Pharmacy- FOLLOW UP    Daphne Morris is a 68 y.o. year old female who Pharmacy has been consulted for vancomycin dosing for trough/random level of 10-15. Based on the patient's indication and renal status this patient is being dosed based on a goal trough/random level of 10-15.     Renal function is currently declining.    Current vancomycin dose: 750 mg given every 24 hours    Most recent random level: 14.5 mcg/mL    Visit Vitals  /67 (BP Location: Right arm, Patient Position: Lying)   Pulse 55   Temp 36.4 °C (97.5 °F) (Temporal)   Resp 18        Lab Results   Component Value Date    CREATININE 3.51 (H) 02/21/2024    CREATININE 3.20 (H) 02/20/2024    CREATININE 3.02 (H) 02/19/2024    CREATININE 2.88 (H) 02/18/2024        Patient weight is No results found for: \"PTWEIGHT\"    No results found for: \"CULTURE\"     I/O last 3 completed shifts:  In: 2953.6 (29.7 mL/kg) [P.O.:1200; I.V.:32.8 (0.3 mL/kg); Blood:1720.8]  Out: 535 (5.4 mL/kg) [Urine:535 (0.1 mL/kg/hr)]  Weight: 99.4 kg   [unfilled]    Lab Results   Component Value Date    PATIENTTEMP  02/21/2024      Comment:      NOTE: Patient Results are Not Corrected for Temperature    PATIENTTEMP  02/20/2024      Comment:      NOTE: Patient Results are Not Corrected for Temperature    PATIENTTEMP  02/19/2024      Comment:      NOTE: Patient Results are Not Corrected for Temperature        Assessment/Plan    Within goal random/trough level but due to declining renal function, new dose 500mg x1 today and DOSE BY LEVEL (CrCl=18)    This dosing regimen is predicted by [employer] to result in the following pharmacokinetic parameters:    Loading dose: N/A  Regimen: 500 mg IV every 24 hours.  Start time: 14:36 on 02/21/2024  Exposure target: AUC24 (range)400-600 mg/L.hr   AUC24,ss: 472 mg/L.hr  Probability of AUC24 > 400: 81 %  Ctrough,ss: 16.4 mg/L  Probability of Ctrough,ss > 20: 19 %  Probability of nephrotoxicity (Lodise MILLER 2009): 12 %  The " next level will be obtained on 02/22 at 0500. May be obtained sooner if clinically indicated.   Will continue to monitor renal function daily while on vancomycin and order serum creatinine at least every 48 hours if not already ordered.  Follow for continued vancomycin needs, clinical response, and signs/symptoms of toxicity.       Karley Elaine, PharmD

## 2024-02-22 LAB
ALBUMIN SERPL BCP-MCNC: 2.5 G/DL (ref 3.4–5)
ANION GAP BLDV CALCULATED.4IONS-SCNC: 8 MMOL/L (ref 10–25)
ANION GAP SERPL CALC-SCNC: 14 MMOL/L (ref 10–20)
BASE EXCESS BLDV CALC-SCNC: 3.8 MMOL/L (ref -2–3)
BASOPHILS # BLD AUTO: 0.01 X10*3/UL (ref 0–0.1)
BASOPHILS NFR BLD AUTO: 0.3 %
BODY TEMPERATURE: ABNORMAL
BUN SERPL-MCNC: 78 MG/DL (ref 6–23)
CA-I BLDV-SCNC: 1.08 MMOL/L (ref 1.1–1.33)
CALCIUM SERPL-MCNC: 7.5 MG/DL (ref 8.6–10.3)
CHLORIDE BLDV-SCNC: 96 MMOL/L (ref 98–107)
CHLORIDE SERPL-SCNC: 94 MMOL/L (ref 98–107)
CO2 SERPL-SCNC: 29 MMOL/L (ref 21–32)
CREAT SERPL-MCNC: 3.75 MG/DL (ref 0.5–1.05)
EGFRCR SERPLBLD CKD-EPI 2021: 13 ML/MIN/1.73M*2
EOSINOPHIL # BLD AUTO: 0 X10*3/UL (ref 0–0.7)
EOSINOPHIL NFR BLD AUTO: 0 %
ERYTHROCYTE [DISTWIDTH] IN BLOOD BY AUTOMATED COUNT: 13.8 % (ref 11.5–14.5)
GLUCOSE BLD MANUAL STRIP-MCNC: 198 MG/DL (ref 74–99)
GLUCOSE BLD MANUAL STRIP-MCNC: 211 MG/DL (ref 74–99)
GLUCOSE BLD MANUAL STRIP-MCNC: 216 MG/DL (ref 74–99)
GLUCOSE BLD MANUAL STRIP-MCNC: 279 MG/DL (ref 74–99)
GLUCOSE BLDV-MCNC: 291 MG/DL (ref 74–99)
GLUCOSE SERPL-MCNC: 267 MG/DL (ref 74–99)
HCO3 BLDV-SCNC: 31 MMOL/L (ref 22–26)
HCT VFR BLD AUTO: 24.2 % (ref 36–46)
HCT VFR BLD EST: 23 % (ref 36–46)
HGB BLD-MCNC: 7.5 G/DL (ref 12–16)
HGB BLDV-MCNC: 7.8 G/DL (ref 12–16)
IMM GRANULOCYTES # BLD AUTO: 0.02 X10*3/UL (ref 0–0.7)
IMM GRANULOCYTES NFR BLD AUTO: 0.6 % (ref 0–0.9)
INHALED O2 CONCENTRATION: 5 %
LACTATE BLDV-SCNC: 0.6 MMOL/L (ref 0.4–2)
LYMPHOCYTES # BLD AUTO: 0.53 X10*3/UL (ref 1.2–4.8)
LYMPHOCYTES NFR BLD AUTO: 14.8 %
MAGNESIUM SERPL-MCNC: 1.92 MG/DL (ref 1.6–2.4)
MCH RBC QN AUTO: 29.9 PG (ref 26–34)
MCHC RBC AUTO-ENTMCNC: 31 G/DL (ref 32–36)
MCV RBC AUTO: 96 FL (ref 80–100)
MONOCYTES # BLD AUTO: 0.26 X10*3/UL (ref 0.1–1)
MONOCYTES NFR BLD AUTO: 7.3 %
NEUTROPHILS # BLD AUTO: 2.75 X10*3/UL (ref 1.2–7.7)
NEUTROPHILS NFR BLD AUTO: 77 %
NRBC BLD-RTO: 0 /100 WBCS (ref 0–0)
OXYHGB MFR BLDV: 74.6 % (ref 45–75)
PCO2 BLDV: 63 MM HG (ref 41–51)
PH BLDV: 7.3 PH (ref 7.33–7.43)
PHOSPHATE SERPL-MCNC: 6 MG/DL (ref 2.5–4.9)
PLATELET # BLD AUTO: 251 X10*3/UL (ref 150–450)
PO2 BLDV: 46 MM HG (ref 35–45)
POTASSIUM BLDV-SCNC: 5.1 MMOL/L (ref 3.5–5.3)
POTASSIUM SERPL-SCNC: 5.1 MMOL/L (ref 3.5–5.3)
RBC # BLD AUTO: 2.51 X10*6/UL (ref 4–5.2)
SAO2 % BLDV: 76 % (ref 45–75)
SODIUM BLDV-SCNC: 130 MMOL/L (ref 136–145)
SODIUM SERPL-SCNC: 132 MMOL/L (ref 136–145)
VANCOMYCIN SERPL-MCNC: 17.9 UG/ML (ref 5–20)
WBC # BLD AUTO: 3.6 X10*3/UL (ref 4.4–11.3)

## 2024-02-22 PROCEDURE — 2500000002 HC RX 250 W HCPCS SELF ADMINISTERED DRUGS (ALT 637 FOR MEDICARE OP, ALT 636 FOR OP/ED)

## 2024-02-22 PROCEDURE — 82947 ASSAY GLUCOSE BLOOD QUANT: CPT

## 2024-02-22 PROCEDURE — 2500000004 HC RX 250 GENERAL PHARMACY W/ HCPCS (ALT 636 FOR OP/ED)

## 2024-02-22 PROCEDURE — 99223 1ST HOSP IP/OBS HIGH 75: CPT | Performed by: NURSE PRACTITIONER

## 2024-02-22 PROCEDURE — 2500000005 HC RX 250 GENERAL PHARMACY W/O HCPCS: Performed by: INTERNAL MEDICINE

## 2024-02-22 PROCEDURE — 94640 AIRWAY INHALATION TREATMENT: CPT

## 2024-02-22 PROCEDURE — 84132 ASSAY OF SERUM POTASSIUM: CPT

## 2024-02-22 PROCEDURE — 99418 PROLNG IP/OBS E/M EA 15 MIN: CPT | Performed by: NURSE PRACTITIONER

## 2024-02-22 PROCEDURE — 2500000001 HC RX 250 WO HCPCS SELF ADMINISTERED DRUGS (ALT 637 FOR MEDICARE OP)

## 2024-02-22 PROCEDURE — 94668 MNPJ CHEST WALL SBSQ: CPT

## 2024-02-22 PROCEDURE — 1200000002 HC GENERAL ROOM WITH TELEMETRY DAILY

## 2024-02-22 PROCEDURE — 80202 ASSAY OF VANCOMYCIN: CPT

## 2024-02-22 PROCEDURE — 2500000004 HC RX 250 GENERAL PHARMACY W/ HCPCS (ALT 636 FOR OP/ED): Performed by: FAMILY MEDICINE

## 2024-02-22 PROCEDURE — 80069 RENAL FUNCTION PANEL: CPT | Performed by: INTERNAL MEDICINE

## 2024-02-22 PROCEDURE — 2500000002 HC RX 250 W HCPCS SELF ADMINISTERED DRUGS (ALT 637 FOR MEDICARE OP, ALT 636 FOR OP/ED): Performed by: FAMILY MEDICINE

## 2024-02-22 PROCEDURE — 83735 ASSAY OF MAGNESIUM: CPT | Performed by: INTERNAL MEDICINE

## 2024-02-22 PROCEDURE — 85025 COMPLETE CBC W/AUTO DIFF WBC: CPT | Performed by: INTERNAL MEDICINE

## 2024-02-22 PROCEDURE — 99233 SBSQ HOSP IP/OBS HIGH 50: CPT

## 2024-02-22 PROCEDURE — 94660 CPAP INITIATION&MGMT: CPT

## 2024-02-22 PROCEDURE — 2500000004 HC RX 250 GENERAL PHARMACY W/ HCPCS (ALT 636 FOR OP/ED): Performed by: INTERNAL MEDICINE

## 2024-02-22 PROCEDURE — 99233 SBSQ HOSP IP/OBS HIGH 50: CPT | Performed by: INTERNAL MEDICINE

## 2024-02-22 RX ORDER — VANCOMYCIN HYDROCHLORIDE 500 MG/100ML
500 INJECTION, SOLUTION INTRAVENOUS ONCE
Status: COMPLETED | OUTPATIENT
Start: 2024-02-22 | End: 2024-02-22

## 2024-02-22 RX ADMIN — VENLAFAXINE HYDROCHLORIDE 75 MG: 75 CAPSULE, EXTENDED RELEASE ORAL at 08:45

## 2024-02-22 RX ADMIN — FUROSEMIDE 10 MG/HR: 10 INJECTION, SOLUTION INTRAMUSCULAR; INTRAVENOUS at 19:27

## 2024-02-22 RX ADMIN — INSULIN LISPRO 3 UNITS: 100 INJECTION, SOLUTION INTRAVENOUS; SUBCUTANEOUS at 13:35

## 2024-02-22 RX ADMIN — AMLODIPINE BESYLATE 5 MG: 5 TABLET ORAL at 08:44

## 2024-02-22 RX ADMIN — OSELTAMAVIR PHOSPHATE 30 MG: 30 CAPSULE ORAL at 08:45

## 2024-02-22 RX ADMIN — METOPROLOL SUCCINATE 50 MG: 50 TABLET, EXTENDED RELEASE ORAL at 20:20

## 2024-02-22 RX ADMIN — IPRATROPIUM BROMIDE AND ALBUTEROL SULFATE 3 ML: 2.5; .5 SOLUTION RESPIRATORY (INHALATION) at 14:16

## 2024-02-22 RX ADMIN — FERROUS GLUCONATE 324 MG: 324 TABLET ORAL at 08:45

## 2024-02-22 RX ADMIN — PANTOPRAZOLE SODIUM 40 MG: 40 TABLET, DELAYED RELEASE ORAL at 20:20

## 2024-02-22 RX ADMIN — CLOPIDOGREL 75 MG: 75 TABLET ORAL at 08:44

## 2024-02-22 RX ADMIN — Medication 5 L/MIN: at 08:16

## 2024-02-22 RX ADMIN — VANCOMYCIN HYDROCHLORIDE 500 MG: 500 INJECTION, SOLUTION INTRAVENOUS at 09:35

## 2024-02-22 RX ADMIN — AMIODARONE HYDROCHLORIDE 200 MG: 200 TABLET ORAL at 08:45

## 2024-02-22 RX ADMIN — PREDNISONE 40 MG: 20 TABLET ORAL at 08:44

## 2024-02-22 RX ADMIN — ASPIRIN 81 MG: 81 TABLET, COATED ORAL at 08:45

## 2024-02-22 RX ADMIN — Medication 5 MG: at 20:20

## 2024-02-22 RX ADMIN — METOPROLOL SUCCINATE 50 MG: 50 TABLET, EXTENDED RELEASE ORAL at 08:45

## 2024-02-22 RX ADMIN — INSULIN LISPRO 9 UNITS: 100 INJECTION, SOLUTION INTRAVENOUS; SUBCUTANEOUS at 08:45

## 2024-02-22 RX ADMIN — ATORVASTATIN CALCIUM 40 MG: 40 TABLET, FILM COATED ORAL at 08:44

## 2024-02-22 RX ADMIN — PANTOPRAZOLE SODIUM 40 MG: 40 TABLET, DELAYED RELEASE ORAL at 08:45

## 2024-02-22 RX ADMIN — IPRATROPIUM BROMIDE AND ALBUTEROL SULFATE 3 ML: 2.5; .5 SOLUTION RESPIRATORY (INHALATION) at 08:16

## 2024-02-22 RX ADMIN — INSULIN LISPRO 6 UNITS: 100 INJECTION, SOLUTION INTRAVENOUS; SUBCUTANEOUS at 18:00

## 2024-02-22 RX ADMIN — Medication 3 L/MIN: at 14:16

## 2024-02-22 RX ADMIN — LEVOTHYROXINE SODIUM 200 MCG: 100 TABLET ORAL at 05:25

## 2024-02-22 RX ADMIN — IPRATROPIUM BROMIDE AND ALBUTEROL SULFATE 3 ML: 2.5; .5 SOLUTION RESPIRATORY (INHALATION) at 19:39

## 2024-02-22 ASSESSMENT — COGNITIVE AND FUNCTIONAL STATUS - GENERAL
STANDING UP FROM CHAIR USING ARMS: A LOT
CLIMB 3 TO 5 STEPS WITH RAILING: A LOT
EATING MEALS: A LITTLE
DAILY ACTIVITIY SCORE: 13
MOBILITY SCORE: 12
MOVING TO AND FROM BED TO CHAIR: A LOT
TOILETING: A LOT
EATING MEALS: A LOT
STANDING UP FROM CHAIR USING ARMS: A LOT
DRESSING REGULAR LOWER BODY CLOTHING: A LOT
DAILY ACTIVITIY SCORE: 12
WALKING IN HOSPITAL ROOM: A LOT
CLIMB 3 TO 5 STEPS WITH RAILING: A LOT
TOILETING: A LOT
PERSONAL GROOMING: A LOT
HELP NEEDED FOR BATHING: A LOT
WALKING IN HOSPITAL ROOM: A LOT
MOVING FROM LYING ON BACK TO SITTING ON SIDE OF FLAT BED WITH BEDRAILS: A LOT
DRESSING REGULAR LOWER BODY CLOTHING: A LOT
PERSONAL GROOMING: A LOT
HELP NEEDED FOR BATHING: A LOT
TURNING FROM BACK TO SIDE WHILE IN FLAT BAD: A LOT
MOVING TO AND FROM BED TO CHAIR: A LOT
MOVING FROM LYING ON BACK TO SITTING ON SIDE OF FLAT BED WITH BEDRAILS: A LOT
TURNING FROM BACK TO SIDE WHILE IN FLAT BAD: A LOT
MOBILITY SCORE: 12
DRESSING REGULAR UPPER BODY CLOTHING: A LOT
DRESSING REGULAR UPPER BODY CLOTHING: A LOT

## 2024-02-22 ASSESSMENT — PAIN SCALES - GENERAL
PAINLEVEL_OUTOF10: 0 - NO PAIN
PAINLEVEL_OUTOF10: 0 - NO PAIN

## 2024-02-22 ASSESSMENT — PAIN - FUNCTIONAL ASSESSMENT
PAIN_FUNCTIONAL_ASSESSMENT: 0-10
PAIN_FUNCTIONAL_ASSESSMENT: 0-10

## 2024-02-22 NOTE — CARE PLAN
Problem: Respiratory  Goal: Clear secretions with interventions this shift  Outcome: Progressing  Goal: No signs of respiratory distress (eg. Use of accessory muscles. Peds grunting)  Outcome: Progressing     Problem: Skin  Goal: Prevent/manage excess moisture  Outcome: Progressing      The clinical goals for the shift include patient will deny shortness of breath this shift    Over the shift, the patient did not make progress toward the following goals. Barriers to progression include patient is forgetful and anxious at times. Recommendations to address these barriers include reiteration of care and family involvement.

## 2024-02-22 NOTE — PROGRESS NOTES
We are following for ANDREA on CKD and acute decompensated heart failure    Clinically unchanged.  Minimal urine output on Lasix drip.  Creatinine up to 3.7.    I reviewed the cardiology note from today.  I discussed the case with hospice staff.  I discussed the situation with her nephew that she is POA    Unfortunately she is not responding to diuretics that she has done previously.  The issue of renal failure and renal replacement therapy is becoming more urgent.  I did speak to her nephew again and indicated that she is not responding to treatment as she had previously and that the issue of dialysis is becoming more immediate.  Hospice meeting is planned for Saturday    ANDREA is worse  ADHF is stable    Prognosis is poor. Cont current POC until more clarity regarding goals of care

## 2024-02-22 NOTE — PROGRESS NOTES
"Vancomycin Dosing by Pharmacy- FOLLOW UP    Daphne Morris is a 68 y.o. year old female who Pharmacy has been consulted for vancomycin dosing for cellulitis, skin and soft tissue. Based on the patient's indication and renal status this patient is being dosed based on a goal trough/random level of 10-15.     Renal function is currently declining.    Current vancomycin dose dose by level.  Currently on 500mg q24h.  Most recent random level of 17.9    Visit Vitals  /51   Pulse 55   Temp 36.8 °C (98.2 °F) (Temporal)   Resp 18        Lab Results   Component Value Date    CREATININE 3.75 (H) 02/22/2024    CREATININE 3.51 (H) 02/21/2024    CREATININE 3.20 (H) 02/20/2024    CREATININE 3.02 (H) 02/19/2024        Patient weight is No results found for: \"PTWEIGHT\"    No results found for: \"CULTURE\"     I/O last 3 completed shifts:  In: 3337.7 (33.6 mL/kg) [P.O.:1600; I.V.:16.9 (0.2 mL/kg); Blood:1720.8]  Out: 535 (5.4 mL/kg) [Urine:535 (0.1 mL/kg/hr)]  Weight: 99.4 kg   [unfilled]    Lab Results   Component Value Date    PATIENTTEMP  02/21/2024      Comment:      NOTE: Patient Results are Not Corrected for Temperature    PATIENTTEMP  02/20/2024      Comment:      NOTE: Patient Results are Not Corrected for Temperature    PATIENTTEMP  02/19/2024      Comment:      NOTE: Patient Results are Not Corrected for Temperature        Assessment/Plan    Above goal AUC. Orders placed for new vancomcyin regimen of dose by level rather than 500mg every 24 hours to begin at 0900.  The next level will be obtained on 2/23 at 5a. May be obtained sooner if clinically indicated.   Will continue to monitor renal function daily while on vancomycin and order serum creatinine at least every 48 hours if not already ordered.  Follow for continued vancomycin needs, clinical response, and signs/symptoms of toxicity.       Brian Justice, PharmD           "

## 2024-02-22 NOTE — CONSULTS
PALLIATIVE MEDICINE CONSULT   Attending Physician: Myke Newsome,*  Reason For Consult: Assistance with determining goals of care, complex medical decision making, code status discussion, and symptom management    INTRODUCTIONS   Patient's capacity: Has fair decision making capacity  Family present: Via phone : POA  Additional staff present: None  Service: Palliative care was introduced as a resource for patients with serious illness to help with symptoms, assist with goals of care conversations, navigate complex decision making, improve quality of life for patients, and provide support to patients and families. Support and empathy was provided throughout the encounter.    SUBJECTIVE   History of the Present Illness  Daphne Morris is a 68 y.o. female with pertinent PMH of CHF, CVA in 2022, COPD, chronic respiratory failure,  2 L of oxygen at baseline,  A-fib, HTN , anemia, CKD stage IV, DM type II, anemia, hypothyroidism, and pericardial effusion who presented to the ED from her nursing facility with dyspnea and hypoxia. Multiple admisssions recently. Has cardiorenal syndrome she received Lasix and underwent right thoracentesis as well as diagnostic right heart cath which showed mild pulmonary hypertension. Medical team expressed poor prognosis and are  concerned that goals of care have not been determined.  Palliative Medicine was consulted for goals of care and complex medical decision making.     Past Medical History   has a past medical history of Acute CHF (CMS/[facility]), ANDREA (acute kidney injury) (CMS/[facility]), Anemia, Arthritis, ASHD (arteriosclerotic heart disease), At risk for falling, Atrial fibrillation (CMS/HCC), Cancer of kidney (CMS/HCC), Chronic diastolic CHF (congestive heart failure) (CMS/HCC), COPD (chronic obstructive pulmonary disease) (CMS/HCC), CVA (cerebral vascular accident) (CMS/HCC), Depression, Diabetes (CMS/HCC), Essential tremor, GERD (gastroesophageal reflux disease), HTN  (hypertension), Hyperlipidemia, Hypothyroidism, Hypoxia, Impacted cerumen, left ear, Left ventricular ejection fraction greater than 40%, Noncompliance, Personal history of other diseases of the respiratory system, Pulmonary edema, Pulmonary hypertension (CMS/HCC), Renal carcinoma, right (CMS/HCC), Respiratory failure (CMS/HCC), Vitamin D deficiency, and Weakness.    Past Surgical History   has a past surgical history that includes MR angio chest w and wo IV contrast (2023); MR angio head wo IV contrast (2021);  section, classic; Shoulder surgery; Ankle surgery; Cataract extraction (Right); Nephrectomy (2021); and Cardiac catheterization (N/A, 2024).    Family Medical History  Family History   Problem Relation Name Age of Onset    Hypertension Mother      Diabetes Father      Heart disease Father      Cancer Sister      Cancer Brother      Diabetes Daughter      Asthma Daughter      Heart attack Daughter      Diabetes Maternal Grandfather      Heart disease Paternal Grandmother      Diabetes Other         Home Medications  Reviewed    Allergies  Allergies   Allergen Reactions    Adhesive Tape-Silicones Other     blisters    Amoxicillin Swelling    Bee Venom Protein (Honey Bee) Swelling    Codeine Headache and Other     Migraines    Doxycycline Unknown    Latex Other     blisters    Lisinopril Swelling     Facial swelling    Prednisone Other     Yeast infection    Citalopram Other and Rash     Facial breakout, blisters, and rash    Oseltamivir Rash    Phenyleph-Shark Oil-Glyc-Pet Rash       Social History  The patient is . They have  2children. One . One in Nursing home for a few mos now.  Smoker every day until she got to the nursing home recently. Denies alcohol or drugs.         Gnosticist and Spirituality:  Bahai  She is  a big Gnosticist goer  and its very important to her    ADVANCE CARE PLANNING AND COUNSELING   Serious Illness Perception   Perspective: patient and  nephew POA :    Impression of disease and stage: severe  Prognosis:fair  Concerns about the future: death  Hopes: Pt was hoping to live another 10 years  Attainability of hopes:possible  Functional status:Maximum assistance  Impact of disease on quality of life: Significant  Minimal acceptable quality of Life: Ability to have improving health and be free of life support machines  Maximum health burden tolerable for the possibility of more time: Unsure  Health preferences and priorities at disease progression: Pt too overwhelmed to express this thought   Rating of family knowledge about pt's disease, priorities and wishes: Fair    Advanced Directive Documents  HPOA: Yes  Living will: No    Emergency Contact Information  Extended Emergency Contact Information  Primary Emergency Contact: DORIS WYNNE  [place]. Phone: [phone]  Relation: Relative  Secondary Emergency Contact: Doug Feliciano  [place] Phone: [phone]  Relation: Friend    Resuscitation Assessment   Overall impression of resuscitation: non beneficial     Counseling Provided  Counseling provided on the current clinical condition, including diagnosis, poor prognosis, management strategies.  Emphasized the importance of advanced directives and provided education on what the documents entail.   Provided education on palliative and hospice support services.  Counseling provided on resuscitation options with respect to QOL, disease state, and outcomes.  The patient/family verbalize understanding of this information.      OBJECTIVE   Inpatient Medications  amiodarone, 200 mg, oral, Daily with breakfast  amLODIPine, 5 mg, oral, Daily  aspirin, 81 mg, oral, Daily  atorvastatin, 40 mg, oral, Daily  clopidogrel, 75 mg, oral, Daily  ferrous gluconate, 324 mg, oral, Daily with breakfast  [Held by provider] furosemide, 80 mg, oral, BID  [Held by provider] heparin (porcine), 5,000 Units, subcutaneous, q8h  [Held by provider] insulin glargine, 14 Units, subcutaneous,  "Nightly  insulin lispro, 0-15 Units, subcutaneous, TID with meals  ipratropium-albuteroL, 3 mL, nebulization, TID  levothyroxine, 200 mcg, oral, Daily before breakfast  lidocaine, 5 mL, infiltration, Once  melatonin, 5 mg, oral, Daily  metoprolol succinate XL, 50 mg, oral, BID  oseltamivir, 30 mg, oral, Daily  pantoprazole, 40 mg, oral, BID  polyethylene glycol, 17 g, oral, Daily  potassium chloride CR, 20 mEq, oral, Daily  potassium chloride CR, 40 mEq, oral, Daily  predniSONE, 40 mg, oral, Daily  venlafaxine XR, 75 mg, oral, Daily      Continuous medications  furosemide, 10 mg/hr, Last Rate: 10 mg/hr (02/21/24 1207)      PRN medications  PRN medications: acetaminophen **OR** acetaminophen **OR** acetaminophen, acetaminophen **OR** acetaminophen **OR** acetaminophen, albuterol, dextrose 10 % in water (D10W), dextrose, diphenhydrAMINE, glucagon, ipratropium-albuteroL, ondansetron **OR** ondansetron, oxygen, oxygen     Last Recorded Vitals  Blood pressure 112/63, pulse 54, temperature 37.2 °C (99 °F), temperature source Temporal, resp. rate 19, height 1.676 m (5' 6\"), weight 98.5 kg (217 lb 2.5 oz), SpO2 95 %.  7 Day Weight Change: Unable to Calculate   Body mass index is 35.05 kg/m².     Relevant Results  Lab Results   Component Value Date    WBC 3.6 (L) 02/22/2024    HGB 7.5 (L) 02/22/2024    HCT 24.2 (L) 02/22/2024    MCV 96 02/22/2024     02/22/2024      Lab Results   Component Value Date    GLUCOSE 267 (H) 02/22/2024    CALCIUM 7.5 (L) 02/22/2024     (L) 02/22/2024    K 5.1 02/22/2024    CO2 29 02/22/2024    CL 94 (L) 02/22/2024    BUN 78 (H) 02/22/2024    CREATININE 3.75 (H) 02/22/2024      Lab Results   Component Value Date    ALT 13 02/19/2024    AST 20 02/19/2024    ALKPHOS 71 02/19/2024    BILITOT 0.3 02/19/2024      Lab Results   Component Value Date    ALBUMIN 2.5 (L) 02/22/2024      Serum creatinine: 3.75 mg/dL (H) 02/22/24 0454  Estimated creatinine clearance: 16.8 mL/min (A)     Relevant " Imaging  XR chest 1 view    Result Date: 2/21/2024  Interpreted By:  Mario Sampson, STUDY: XR CHEST 1 VIEW;  2/21/2024 11:37 am   INDICATION: Signs/Symptoms:SOB.   COMPARISON: 02/18/2024 AP and 02/13/2024 AP and lateral chest x-rays and the 02/01/2024 unenhanced thoracic CT.   ACCESSION NUMBER(S): Multilevel spinal degenerative changes are noted. [id number]   ORDERING CLINICIAN: JIM ADORNO   FINDINGS: Portable AP upright chest x-ray 02/21/2024:       CARDIOMEDIASTINAL SILHOUETTE: The heart seems enlarged, but its size is likely to be exaggerated by the AP lordotic projection. Vascular calcification and a left atrial appendage Watchman device are noted. Mild coronary artery calcification demonstrated on CT can not be appreciated.   LUNGS: A large right pleural effusion demonstrated on the above studies markedly limits evaluation of the right lung. New left retrocardiac opacity suggests lower lobe infiltrate or atelectasis as the left lateral costophrenic angle is sharp. Pulmonary vascular cephalization suggests congestive cardiac failure, fluid overload or uremia. Refer to the 02/01/2024 CT report for further details.   ABDOMEN: No remarkable upper abdominal findings.   BONES: No acute osseous changes. Mild multilevel spinal degenerative changes are noted.       1. Large right pleural effusion similar to recent prior studies. 2. Pulmonary venous hypertension. 3. Cardiomegaly. 4. Probable left lower lobe infiltrate or atelectasis.       MACRO: None   Signed by: Mario Sampson 2/21/2024 11:57 AM Dictation workstation:   [id number]    NM Lung perfusion with spect/ct    Result Date: 2/19/2024  Interpreted By:  Rolando Dillon and Osman Sena STUDY: NM LUNG PERFUSION WITH SPECT/CT;  2/19/2024 9:58 am   INDICATION: Signs/Symptoms: 68-year-old female presented with known congestive heart failure. R/o pulmoanry embolism.   COMPARISON: Chest x-ray from 02/18/2024   ACCESSION NUMBER(S): [id number]    ORDERING CLINICIAN: SARAH LEMOS   TECHNIQUE: DIVISION OF NUCLEAR MEDICINE PERFUSION LUNG SCANS   Multiple perfusion images of the lungs were acquired after the intravenous administration of 4.0 mCi of Tc-99m macroaggregated albumin (MAA). In addition,SPECT/CT of the chest was performed.   FINDINGS: Perfusion images of both lungs demonstrate mild heterogeneity throughout the lung fields bilaterally. There are no distinct segmental or subsegmental perfusion defects. Fissure sign seen at right lung compatible with right pleural effusion.       There are no segmental or subsegmental perfusion defects in both lungs indicating low probability for acute pulmonary embolism. Moderate-sized right pleural effusion.     The interpretation above is based on modified PISAPED criteria.   This study was analyzed and interpreted at [place], [place].   Signed by: Rolando Dillon 2/19/2024 10:38 AM Dictation workstation:   [id number]    ECG 12 Lead    Result Date: 2/19/2024  Junctional rhythm Left axis deviation Inferior infarct , age undetermined Anterolateral infarct (cited on or before 01-FEB-2024) Abnormal ECG When compared with ECG of 01-FEB-2024 02:46, Junctional rhythm has replaced Sinus rhythm Questionable change in initial forces of Lateral leads    Vascular US lower extremity venous duplex bilateral    Result Date: 2/18/2024  Interpreted By:  Washington Peraza, STUDY: [facility] US LOWER EXTREMITY VENOUS DUPLEX BILATERAL  2/18/2024 8:09 pm   INDICATION: 67 y/o   F with  Signs/Symptoms:d dimer > 13,000. LMP:  Unknown.   COMPARISON: None.   ACCESSION NUMBER(S): [id number]   ORDERING CLINICIAN: RAINER BARR   TECHNIQUE: Routine ultrasound of the  bilateral lower extremity was performed with duplex Doppler (color and spectral) evaluation.   Static images were obtained for remote interpretation.   FINDINGS: THIGH VEINS:  The common femoral, femoral, popliteal, proximal medial saphenous, and deep femoral veins  are patent and free of thrombus. The veins are normally compressible.  They demonstrate normal phasic flow and augmentation response.   CALF VEINS: Significant soft tissue swelling somewhat limits assessment.       Negative study.  No deep venous thrombosis of the  bilateral lower extremity.  Soft tissue swelling limits assessment   MACRO: None   Signed by: Washington Peraza 2/18/2024 8:41 PM Dictation workstation:   [id number]    XR chest 1 view    Result Date: 2/18/2024  Interpreted By:  Erlin Lui, STUDY: XR CHEST 1 VIEW;  2/18/2024 4:32 pm   INDICATION: Signs/Symptoms:Dyspnea history of CHF.   COMPARISON: CT scan from 02/01/2024.   ACCESSION NUMBER(S): [id number]   ORDERING CLINICIAN: RAINER BARR   TECHNIQUE: Single AP portable view of the chest was obtained.   FINDINGS: MEDIASTINUM/ LUNGS/ TAMMIE: Cardiomegaly. Pulmonary vasculature and interstitium are prominent and indistinct. Slight new pleural and parenchymal opacities at the lateral left lung base. Persistent pleural and parenchymal opacities in the mid and lower right hemithorax. These are grossly stable. Stable calcification in the aorta.   No pneumothorax. No tracheal deviation. No abnormal hilar fullness or gross mass on either side.   BONES: No lytic or blastic destructive bone lesion.  There is mild-to-moderate disc space narrowing and endplate osteophytosis throughout the thoracic spine.   UPPER ABDOMEN: Grossly intact.       Findings of ongoing CHF.   Grossly stable pleural and parenchymal opacities in the mid and lower right hemithorax. Mild new left pleural effusion with left basilar atelectasis or edema.   MACRO: None   Signed by: Erlin Lui 2/18/2024 4:59 PM Dictation workstation:   [id number]    XR chest 2 views    Result Date: 2/14/2024  Interpreted By:  Amando Starks, STUDY: XR CHEST 2 VIEWS; 2/13/2024 8:25 am   INDICATION: Signs/Symptoms:pleural effusion   COMPARISON: Radiographs 02/09/2024   ACCESSION NUMBER(S): [id number]    ORDERING CLINICIAN: TONIA MONTANA   TECHNIQUE: Frontal and lateral radiographs of the chest were obtained.   FINDINGS: LINES AND DEVICES: None.   LUNGS: Stable moderate to large right pleural effusion with adjacent atelectasis. No new focal consolidation, left pleural effusion or pneumothorax.   CARDIOMEDIASTINAL SILHOUETTE: Partially obscured right heart border by adjacent effusion.   OTHER: No acute osseous abnormality.       Stable moderate to large right pleural effusion.   MACRO None   Signed by: Amando Starks 2/14/2024 9:50 AM Dictation workstation:   [id number]    XR chest 1 view    Result Date: 2/9/2024  Interpreted By:  Sukumar Daugherty, STUDY: XR CHEST 1 VIEW;  2/9/2024 1:13 pm   INDICATION: Signs/Symptoms:fever.   COMPARISON: 02/02/2024   ACCESSION NUMBER(S): [id number]   ORDERING CLINICIAN: TONIA MONTANA   FINDINGS: Significantly increased large right pleural effusion with overlying atelectasis. Left lung grossly clear. Cardiomediastinal silhouette unchanged. Aortic atherosclerosis. Pulmonary vascular congestion.       Large right pleural effusion, significantly increased compared to 1 week ago.     Signed by: Sukumar Daugherty 2/9/2024 3:56 PM Dictation workstation:   [id number]    Cardiac catheterization - non-coronary    Result Date: 2/7/2024   [facility], Cath Lab, [address] [place] [address] Cardiovascular Catheterization Report Patient Name:      MOE MAYA      Performing Physician:  Tobin Antonio MD Study Date:        2/7/2024             Verifying Physician:   Tobin Antonio MD MRN/PID:           [id number]             Cardiologist/Co-scrub: Accession#:        [id number]         Ordering Physician:    [id number] DOMONIQUE PURCELL Date of Birth/Age: 1955  / 68 years Fellow: Gender:            F                    Fellow: Encounter#:        [id number]  Study: Right Heart Cath  Indications: MOE MAYA is a 69 year old female who presents with dyslipidemia, hypertension, renal failure, atrial fibrillation, diabetes, peripheral artery disease, chronic pulmonary disease, obesity and Tobacco Use - Current. Heart failure. Pulmonary hypertension and heart failure.  Procedure Description: After infiltration of local anesthetic, the right internal jugular vein was identified with two-dimensional ultrasound. Under direct ultrasound visualization, the right internal jugular vein was cannulated with a micropuncture technique. A 7 Dominican sheath was placed in the vein. A balloon tipped catheter was advanced through the right heart to record pressures. Cardiac output was calculated via the Steph method.  Right Heart Catheterization: A balloon tipped catheter was advanced through the right heart to record pressures. Cardiac output was calculated via the Stehp method. Elevated left sided filling pressures with normal cardiac output. Cardiac output is normal. Preserved cardiac output at rest. No evidence of shunt. Elevated pulmonary vascular resistance. Elevated systemic vascular resistance. Pulmonary venous hypertension and pulmonary arterial hypertension.  Hemo Personnel: +-------------------------+---------+ Name                     Duty      +-------------------------+---------+ Nicholas Antonio MDPROC MD 1 +-------------------------+---------+  +----------+ Contrast:  +----------+ Omnipaque: +----------+  Hemodynamic Pressures:  +----+---------+----------+------------+-------------+---+-----+-------+-------+ SiteDate TimePhase Name  Systolic    Diastolic  ED Mean A-Wave V-Wave                             mmHg        mmHg     mmHmmHg  mmHg   mmHg                                                    g                      +----+---------+----------+------------+-------------+---+-----+-------+-------+   RA 2/7/2024  AIR REST                                9     13     11       2:16:48                                                                     PM                                                          +----+---------+----------+------------+-------------+---+-----+-------+-------+   RV 2/7/2024  AIR REST          56            3 14                          2:17:18                                                                     PM                                                          +----+---------+----------+------------+-------------+---+-----+-------+-------+   PA 2/7/2024  AIR REST          55           15      30                     2:21:53                                                                     PM                                                          +----+---------+----------+------------+-------------+---+-----+-------+-------+   PW 2/7/2024  AIR REST                               23     20     35       2:23:01                                                                     PM                                                          +----+---------+----------+------------+-------------+---+-----+-------+-------+   PW 2/7/2024  AIR REST                               23     20     38       2:23:28                                                                     PM                                                          +----+---------+----------+------------+-------------+---+-----+-------+-------+   PA 2/7/2024  AIR REST          57           15      33                     2:24:12                                                                     PM                                                           +----+---------+----------+------------+-------------+---+-----+-------+-------+   AO 2/7/2024  AIR REST         176           62      -2                     2:28:01                                                                     PM                                                          +----+---------+----------+------------+-------------+---+-----+-------+-------+  Oxygen Saturation %: +-----------+----------+------------+ Sample SiteO2 Sat (%)HB (g/100ml) +-----------+----------+------------+          FA        94         8.6 +-----------+----------+------------+          RA        64         8.6 +-----------+----------+------------+          FA        94         8.6 +-----------+----------+------------+          PA        60         8.6 +-----------+----------+------------+  Cardiac Outputs: +---------------+------------------+-------+ ISABELLE CO (l/min)ISABELLE CI (l/min/m2)ISABELLE SV +---------------+------------------+-------+             8.0               3.8  138.5 +---------------+------------------+-------+  Vascular Resistance Calculated Values (Wood Units): +-----+---+----+---+----+ PhasePVRPVRITPRTPRI +-----+---+----+---+----+ 0    1.02.1 4.29.0  +-----+---+----+---+----+  Complications: No in-lab complications observed.  Cardiac Cath Post Procedure Notes: Post Procedure           Moderately elevated PCWP, normal cardiac output, no Diagnosis:               shunts. Blood Loss:              Estimated blood loss during the procedure was 0 mls. Specimens Removed:       Number of specimen(s) removed: none.  Recommendations: Maximize medical therapy. Agressive risk factor modification efforts. Optimize volume status with diuretics. Renal replacement therapy. Patient counseled and advised to discontinue smoking. ____________________________________________________________________________________ CONCLUSIONS:  1. RHC shows moderate pulmonary  HTN, mPAP 30-33 mm Hg, mRA 9 mm Hg, normal cardiac output and no shunts.  2. Moderately elevated PCWP. ICD 10 Codes: Acute diastolic (congestive) heart failure (CHF)-I50.31  CPT Codes: Right Heart Cath O2/Cardiac output without biopsy (RHC)-73396; Moderate Sedation Services 1st additional 15 minutes patient >5 years-29057; Luis Crook-[id number]  [id number] Nicholas Antonio MD Performing Physician Electronically signed by [id number] Nicholas Antonio MD on 2/7/2024 at 5:02:34 PM  ** Final **     Transthoracic Echo (TTE) Complete    Result Date: 2/5/2024   [facility], [address] [place] [address]               Tel [phone] and Fax [phone] TRANSTHORACIC ECHOCARDIOGRAM REPORT  Patient Name:      MOE BABINGLENDEMETRIA      Reading Physician:    [id number] Antonio MD Study Date:        2/5/2024             Ordering Provider:    [id number] DOMONIQUE PURCELL MRN/PID:           [id number]             Fellow: Accession#:        [id number]         Nurse: Date of Birth/Age: 1955 / 68 years Sonographer:          Nina Angel RDCS Gender:            F                    Additional Staff: Height:            167.64 cm            Admit Date:           2/2/2024 Weight:            102.51 kg            Admission Status:     Inpatient -                                                               Routine BSA:               2.11 m2              Encounter#:           [id number]                                         Department Location:  [facility] Non                                                               Invasive Blood Pressure: 147 /65 mmHg Study Type:    TRANSTHORACIC ECHO (TTE) COMPLETE Diagnosis/ICD: Acute combined systolic (congestive) and diastolic (congestive)                heart failure (CHF)-I50.41  Indication:    Congestive Heart Failure CPT Code:      Echo Complete w Full Doppler-50430 Patient History: Diabetes:         Yes Pertinent         A-Fib, HTN, Dyspnea and LE Edema. Watchman device, elevated History:          BNP,. Study Detail: The following Echo studies were performed: 2D, M-Mode, Doppler and               color flow.  PHYSICIAN INTERPRETATION: Left Ventricle: The left ventricular systolic function is normal, with an estimated ejection fraction of 60-65%. There are no regional wall motion abnormalities. The left ventricular cavity size is normal. Spectral Doppler shows an impaired relaxation pattern of left ventricular diastolic filling. Left Atrium: The left atrium is moderately dilated. Right Ventricle: The right ventricle is mildly enlarged. There is mildly reduced right ventricular systolic function. Right Atrium: The right atrium is moderately dilated. Aortic Valve: The aortic valve is trileaflet. There is mild aortic valve cusp calcification. There is trivial aortic valve regurgitation. The peak instantaneous gradient of the aortic valve is 14.1 mmHg. The mean gradient of the aortic valve is 9.1 mmHg. Mitral Valve: The mitral valve is normal in structure. There is mild mitral annular calcification. There is mild to moderate mitral valve regurgitation. Tricuspid Valve: The tricuspid valve is structurally normal. There is moderate tricuspid regurgitation. Pulmonic Valve: The pulmonic valve is structurally normal. There is mild pulmonic valve regurgitation. Pericardium: There is a small to moderate pericardial effusion posterior to the left ventricle. There is no evidence of cardiac tamponade. Aorta: The aortic root is normal. Pulmonary Artery: The tricuspid regurgitant velocity is 3.04 m/s, and with an estimated right atrial pressure of 3 mmHg, the estimated pulmonary artery pressure is mildly elevated with the RVSP at 39.9 mmHg. Pulmonary Veins: The pulmonary veins appear normal and return  normally to the left atrium. Systemic Veins: The inferior vena cava appears to be of normal size. There is IVC inspiratory collapse greater than 50%.  CONCLUSIONS:  1. Left ventricular systolic function is normal with a 60-65% estimated ejection fraction.  2. Spectral Doppler shows an impaired relaxation pattern of left ventricular diastolic filling.  3. There is mildly reduced right ventricular systolic function.  4. The left atrium is moderately dilated.  5. The right atrium is moderately dilated.  6. There is a small to moderate pericardial effusion.  7. There is no evidence of cardiac tamponade.  8. Mild to moderate mitral valve regurgitation.  9. Moderate tricuspid regurgitation visualized. 10. Normal CVP. Estimated PASP around 45 mm Hg. QUANTITATIVE DATA SUMMARY: 2D MEASUREMENTS:                           Normal Ranges: IVSd:          1.52 cm    (0.6-1.1cm) LVPWd:         1.42 cm    (0.6-1.1cm) LVIDd:         4.35 cm    (3.9-5.9cm) LVIDs:         2.94 cm LV Mass Index: 120.7 g/m2 LV % FS        32.3 % LA VOLUME:                              Normal Ranges: LA Vol A4C:       79.5 ml    (22+/-6mL/m2) LA Vol A2C:       78.2 ml LA Vol BP:        79.7 ml LA Vol Index A4C: 37.7ml/m2 LA Vol Index A2C: 37.1 ml/m2 LA Vol Index BP:  37.9 ml/m2 LA Volume Index:  37.8 ml/m2 LA Vol A4C:       76.0 ml LA Vol A2C:       73.9 ml RA VOLUME BY A/L METHOD:                       Normal Ranges: RA Area A4C: 17.8 cm2 M-MODE MEASUREMENTS:                  Normal Ranges: Ao Root: 3.00 cm (2.0-3.7cm) LAs:     5.08 cm (2.7-4.0cm) LV SYSTOLIC FUNCTION BY 2D PLANIMETRY (MOD):                     Normal Ranges: EF-A4C View: 63.4 % (>=55%) EF-A2C View: 63.8 % EF-Biplane:  62.5 % LV DIASTOLIC FUNCTION:                             Normal Ranges: MV Peak E:      1.33 m/s    (0.7-1.2 m/s) MV Peak A:      0.53 m/s    (0.42-0.7 m/s) E/A Ratio:      2.52        (1.0-2.2) MV e'           0.06 m/s    (>8.0) MV lateral e'   0.06 m/s MV medial e'     0.06 m/s MV A Dur:       110.73 msec E/e' Ratio:     22.09       (<8.0) PulmV Sys Mike:  53.97 cm/s PulmV Dugan Mike: 77.63 cm/s PulmV S/D Mike:  0.70 MITRAL VALVE:                 Normal Ranges: MV DT: 245 msec (150-240msec) MITRAL INSUFFICIENCY:                         Normal Ranges: MR VTI:     171.38 cm MR Vmax:    489.73 cm/s MR Volume:  20.07 ml MR Flow Rt: 57.36 ml/s MR EROA:    0.12 cm2 AORTIC VALVE:                                    Normal Ranges: AoV Vmax:                1.88 m/s  (<=1.7m/s) AoV Peak P.1 mmHg (<20mmHg) AoV Mean P.1 mmHg  (1.7-11.5mmHg) LVOT Max Mike:            0.97 m/s  (<=1.1m/s) AoV VTI:                 46.22 cm  (18-25cm) LVOT VTI:                24.89 cm LVOT Diameter:           1.96 cm   (1.8-2.4cm) AoV Area, VTI:           1.62 cm2  (2.5-5.5cm2) AoV Area,Vmax:           1.56 cm2  (2.5-4.5cm2) AoV Dimensionless Index: 0.54  RIGHT VENTRICLE: RV Basal 3.80 cm RV Mid   2.80 cm RV Major 8.0 cm TAPSE:   18.0 mm RV s'    0.13 m/s TRICUSPID VALVE/RVSP:                             Normal Ranges: Peak TR Velocity: 3.04 m/s RV Syst Pressure: 39.9 mmHg (< 30mmHg) PULMONIC VALVE:                      Normal Ranges: PV Max Mike: 1.0 m/s  (0.6-0.9m/s) PV Max PG:  3.9 mmHg Pulmonary Veins: PulmV Dugan Mike: 77.63 cm/s PulmV S/D Mike:  0.70 PulmV Sys Mike:  53.97 cm/s  [id number] Nicholas Antonio MD Electronically signed on 2024 at 5:06:05 PM  ** Final **     XR knee left 4+ views    Result Date: 2024  Interpreted By:  Adama Jonas, STUDY: Left knee dated  2024.   INDICATION: Signs/Symptoms:left kne pain   COMPARISON: Radiographs dated 10/23/2018.   ACCESSION NUMBER(S): [id number]   ORDERING CLINICIAN: JOSUÉ REICH   TECHNIQUE: Four views of the left knee.   FINDINGS: No fracture or dislocation is evident.  There is a small knee joint effusion. There is moderate tricompartmental degenerative change of the knee. Reticular increased density is seen in the  subcutaneous tissues.       1. Small joint effusion and degenerative change of the knee without osseous injury evident. Knowledge of any trauma may be helpful. If there is persistent concern for osseous injury, cross-sectional imaging can be considered. 2. Reticular increased density in the subcutaneous tissues which may represent lymphedema and/or cellulitis.   MACRO: None   Signed by: Adama Jonas 2/2/2024 4:24 PM Dictation workstation:   [id number]    XR chest 1 view    Result Date: 2/2/2024  Interpreted By:  Rayna Carson, STUDY: XR CHEST 1 VIEW;  2/2/2024 2:29 pm   INDICATION: Signs/Symptoms:S/p thoracentesis.   COMPARISON: 02/01/2024   ACCESSION NUMBER(S): [id number]   ORDERING CLINICIAN: AIDAN HERNANDEZ   TECHNIQUE: Portable AP upright   FINDINGS: The cardiac silhouette is enlarged but unchanged. Aorta is atherosclerotic. There is marked improvement in the right pleural effusion since the prior study as a result of interval thoracentesis. No pneumothorax is present. There is minimal fluid residual blunting the costophrenic angle. There is minimal atelectasis at the right lung base. Pulmonary vasculature appears congested. No interval change is noted in the osseous structures.       Marked interval improvement in the right pleural effusion with interval thoracentesis. No pneumothorax.   Pulmonary vascular congestion   Minimal atelectasis right lung base   MACRO: None.   Signed by: Rayna Carson 2/2/2024 3:23 PM Dictation workstation:   [id number]    US renal complete    Result Date: 2/2/2024  Interpreted By:  Amando Starks, STUDY: US RENAL COMPLETE; 2/2/2024 12:23 pm   INDICATION: Signs/Symptoms:ANDREA on CKD.   COMPARISON: Renal ultrasound 05/01/2023   ACCESSION NUMBER(S): [id number]   ORDERING CLINICIAN: JESSICA EPPS   TECHNIQUE: Sonography of the kidneys and urinary bladder was performed.   FINDINGS: Right Kidney: Right total nephrectomy.   Left Kidney: *Renal length: 10.6 cm *Parenchyma:  Normal parenchymal echogenicity. Normal parenchymal thickness. *Collecting system: No hydronephrosis. *Calculus: No echogenic, shadowing calculus. *Lesion: None.   Bladder: Normal sonographic appearance.       No left hydronephrosis. Right total nephrectomy.   MACRO: None   Signed by: Amando Starks 2/2/2024 2:36 PM Dictation workstation:   [id number]    ECG 12 lead    Result Date: 2/1/2024  Sinus bradycardia Left axis deviation Low voltage QRS Possible Anterolateral infarct , age undetermined Abnormal ECG When compared with ECG of 19-JUL-2023 14:12, Previous ECG has undetermined rhythm, needs review Left bundle branch block is no longer Present Borderline criteria for Anterior infarct are now Present Borderline criteria for Anterolateral infarct are now Present See ED provider note for full interpretation and clinical correlation Confirmed by Wanda Juan ([id number]) on 2/1/2024 10:32:50 AM    CT chest wo IV contrast    Result Date: 2/1/2024  Interpreted By:  Beulah Chacon, STUDY: CT CHEST WO IV CONTRAST;  2/1/2024 6:40 am   INDICATION: Signs/Symptoms:sob   COMPARISON: Chest x-ray 02/01/2024. CT chest 05/04/2021   ACCESSION NUMBER(S): [id number]   ORDERING CLINICIAN: BHARGAV SWEET   TECHNIQUE: Axial CT images were obtained through the chest with no intravenous contrast administration.  Coronal and sagittal reformats were performed.   FINDINGS: There is motion artifact.   HEART AND VESSELS: No thoracic aortic aneurysm. Atherosclerotic calcifications are noted at the thoracic aorta. The main pulmonary artery is dilated. The heart is enlarged.   There is a small pericardial effusion. Status post left atrial appendage occlusion device placement.   MEDIASTINUM AND TAMMIE, LOWER NECK AND AXILLA: There is a 1.1 cm peripherally calcified nodule at the left lobe of the thyroid gland.   No obvious pathologically enlarged lymph nodes on unenhanced CT.   LUNGS AND AIRWAYS: The trachea and central airways are patent.    The evaluation of the lungs is degraded by motion artifact. There is a large right pleural effusion with complete collapse of the right lower lobe. Atelectasis/consolidation at right upper lobe and right middle lobe. Tree-in-bud airspace opacities at the right upper lobe and left lower lobe. There is mild atelectasis at the left lower lobe. No left pleural effusion. No pneumothorax.   UPPER ABDOMEN: Motion artifact. No obvious acute findings.   CHEST WALL AND OSSEOUS STRUCTURES: There is soft tissue edema at the chest wall and visualized abdominal wall. The evaluation for acute fracture is degraded by motion artifact. Multilevel degenerative changes of the spine.       1. Study degraded by motion artifact. Large right pleural effusion with complete collapse of the right lower lobe. Atelectasis/consolidation at the right upper lobe and right middle lobe. Follow-up CT after treatment recommended to ensure complete resolution and exclude underlying mass. 2. Tree-in-bud airspace opacities at the right upper lobe and left lower lobe, may be seen with acute infection/inflammation of the smaller airways such as bronchiolitis or bronchopneumonia. 3. Mild atelectasis at the left lower lobe. 4. Cardiomegaly. Small pericardial effusion. Dilated main pulmonary artery, please correlate for pulmonary arterial hypertension. 5. 1.1 cm peripherally calcified nodule at the left lobe of the thyroid gland. This can be further characterized with nonemergent thyroid ultrasound. 6. Soft tissue edema at the chest wall and visualized abdominal wall.         MACRO:   Critical Finding:  See findings. Notification was initiated on 2/1/2024 at 6:50 am by  Beulah Chacon.  (**-YCF-**) Instructions:   Signed by: Beulah Chacon 2/1/2024 6:56 AM Dictation workstation:   [id number]    XR chest 1 view    Result Date: 2/1/2024  Interpreted By:  Lesley Dooley, STUDY: XR CHEST 1 VIEW;  2/1/2024 3:03 am   INDICATION: Signs/Symptoms:sob.    COMPARISON: 07/19/2023   ACCESSION NUMBER(S): [id number]   ORDERING CLINICIAN: BHARGAV SWEET   FINDINGS:     CARDIOMEDIASTINAL SILHOUETTE: Stable cardiomegaly. Vague density overlying the upper cardiac silhouette may represent a left atrial appendage closure device.   LUNGS: Opacity involving the mid to lower right thorax, likely combination of large right pleural effusion and atelectasis. No pneumothorax.   ABDOMEN: No remarkable upper abdominal findings.   BONES: No acute osseous abnormality.       New large right pleural effusion and basilar atelectasis. Underlying pneumonia is not excluded in the appropriate clinical setting.   MACRO: None   Signed by: Lesley Dooley 2/1/2024 3:19 AM Dictation workstation:   [id number]       Physical Exam  General:  elderly woman, ill appearing, sitting up in bed, dyspneic at rest, alert and oriented times 3  Skin:  Warm and dry. No cyanosis or mottling  HEENT: Atraumatic head, mucous membranes are dry  Respiratory: tachypnea, labored breathing  Cardiovascular:  IRR  Abdomen: Flat, soft, no guarding  Urologic: Federica urine  Musculoskeletal:  Generalized weakness   Neurologic:  Speech is clear. Follows simple commands.   Mental status:  appropriate mood and behavior      ASSESSMENT AND PLAN   Impression  This is a terminally ill 68 year old with pertinent PMH of  CHF, CVA,  CKD, COPD and respiratory failure who is hospitalized for  acute respiratory failure and CHF exacerbation. This has been complicated by cardiorenal syndrome and poor prognosis.     Goals of Care: goals of care were unable to be ascertained due to pt being shocked about her condition and prognosis  Prognosis: poor  Estimated Life Expectancy: Weeks to month(s), regardless of dialysis; Would be unlikely to feel better on dialysis and unlikely to tolerate very long   Hospice Eligibility: Yes, Diagnosis multi organ  failure, cardiorenal syndrome    Recommendation:   Consider hospice    Plan:  Continue supportive  care  Giving pt some time to process the bad news she got today; support and empathy provided throughout encounter  She wishes to discuss the decision with author and POA 2/23 AM  Hospice consulted: [facility], meeting likely on Saturday per request of POA       Thank you for asking Palliative Care to assist with care of this patient.  Please contact us for additional questions or concerns.    Discussed with and updated: Attending and nephrologist    MEDICAL COMPLEXITY    I spent 90 minutes in the care of this patient which included chart review, interviewing patient/family, discussion with primary team, coordination of care, and documentation.    CONTACT INFORMATION   Susan Reddy CNP  Palliative Medicine  [facility] [employer] chat

## 2024-02-22 NOTE — PROGRESS NOTES
"Daphne Morris is a 68 y.o. female on day 3 of admission presenting with Heart failure (CMS/HCC).      Subjective   She is sitting up in the bed, more awake, states shortness of breath is improved. Currently on HFNC at 5L. Still with significant BLE edema and upper extremity edema       Review of systems:  Constitutional: negative for fever, chills, or malaise  Neuro: negative for dizziness, headache, numbness, tingling  ENT: Negative for nasal congestion or sore throat  CV: negative for chest pain, palpitations  GI: negative for abd pain, nausea, vomiting or diarrhea  : negative for dysuria, frequency, or urgency  Skin: negative for lesions, wounds, or rash  Musculoskeletal: Negative for myalgia, or arthralgia  Endocrine: Negative for polyuria or polydipsia         Objective   Constitutional: Well developed, alert and oriented x3, no distress, cooperative  Eyes: PERRL, EOMI, clear sclera  ENMT: mucous membranes moist, no apparent injury, no lesions seen  Head/Neck: Neck supple, no apparent injury, thyroid without mass or tenderness, No JVD, trachea midline, no bruits  Respiratory/Thorax: Patent airways, CTAB, normal breath sounds with good chest expansion, thorax symmetric  Cardiovascular: Regular, rate and rhythm, no murmurs, 2+ equal pulses of the extremities, normal S 1and S 2  Gastrointestinal: Nondistended, soft, non-tender, no rebound tenderness or guarding, no masses palpable, no organomegaly, +BS, no bruits  Musculoskeletal: ROM intact, no joint swelling, normal strength  Extremities: generalized edema  Neurological: Alert and oriented x3  Lymphatic: No significant lymphadenopathy  Skin: Warm and dry, no lesions, no rashes      Last Recorded Vitals  /63 (BP Location: Right arm, Patient Position: Lying)   Pulse 54   Temp 37.2 °C (99 °F) (Temporal)   Resp 19   Ht 1.676 m (5' 6\")   Wt 98.5 kg (217 lb 2.5 oz)   SpO2 95%   BMI 35.05 kg/m²     Intake/Output last 3 Shifts:  I/O last 3 completed " shifts:  In: 2115.5 (21.5 mL/kg) [P.O.:940; I.V.:25.5 (0.3 mL/kg); Blood:1150]  Out: 550 (5.6 mL/kg) [Urine:550 (0.2 mL/kg/hr)]  Weight: 98.5 kg   I/O this shift:  In: 240 [P.O.:240]  Out: -     Relevant Results  Scheduled medications  amiodarone, 200 mg, oral, Daily with breakfast  amLODIPine, 5 mg, oral, Daily  aspirin, 81 mg, oral, Daily  atorvastatin, 40 mg, oral, Daily  clopidogrel, 75 mg, oral, Daily  ferrous gluconate, 324 mg, oral, Daily with breakfast  [Held by provider] furosemide, 80 mg, oral, BID  [Held by provider] heparin (porcine), 5,000 Units, subcutaneous, q8h  [Held by provider] insulin glargine, 14 Units, subcutaneous, Nightly  insulin lispro, 0-15 Units, subcutaneous, TID with meals  ipratropium-albuteroL, 3 mL, nebulization, TID  levothyroxine, 200 mcg, oral, Daily before breakfast  lidocaine, 5 mL, infiltration, Once  melatonin, 5 mg, oral, Daily  metoprolol succinate XL, 50 mg, oral, BID  oseltamivir, 30 mg, oral, Daily  pantoprazole, 40 mg, oral, BID  polyethylene glycol, 17 g, oral, Daily  potassium chloride CR, 20 mEq, oral, Daily  potassium chloride CR, 40 mEq, oral, Daily  predniSONE, 40 mg, oral, Daily  venlafaxine XR, 75 mg, oral, Daily      Continuous medications  furosemide, 10 mg/hr, Last Rate: 10 mg/hr (02/21/24 1207)      PRN medications  PRN medications: acetaminophen **OR** acetaminophen **OR** acetaminophen, acetaminophen **OR** acetaminophen **OR** acetaminophen, albuterol, dextrose 10 % in water (D10W), dextrose, diphenhydrAMINE, glucagon, ipratropium-albuteroL, ondansetron **OR** ondansetron, oxygen, oxygen    Results for orders placed or performed during the hospital encounter of 02/19/24 (from the past 24 hour(s))   POCT GLUCOSE   Result Value Ref Range    POCT Glucose 262 (H) 74 - 99 mg/dL   CBC and Auto Differential   Result Value Ref Range    WBC 3.6 (L) 4.4 - 11.3 x10*3/uL    nRBC 0.0 0.0 - 0.0 /100 WBCs    RBC 2.51 (L) 4.00 - 5.20 x10*6/uL    Hemoglobin 7.5 (L) 12.0 -  16.0 g/dL    Hematocrit 24.2 (L) 36.0 - 46.0 %    MCV 96 80 - 100 fL    MCH 29.9 26.0 - 34.0 pg    MCHC 31.0 (L) 32.0 - 36.0 g/dL    RDW 13.8 11.5 - 14.5 %    Platelets 251 150 - 450 x10*3/uL    Neutrophils % 77.0 40.0 - 80.0 %    Immature Granulocytes %, Automated 0.6 0.0 - 0.9 %    Lymphocytes % 14.8 13.0 - 44.0 %    Monocytes % 7.3 2.0 - 10.0 %    Eosinophils % 0.0 0.0 - 6.0 %    Basophils % 0.3 0.0 - 2.0 %    Neutrophils Absolute 2.75 1.20 - 7.70 x10*3/uL    Immature Granulocytes Absolute, Automated 0.02 0.00 - 0.70 x10*3/uL    Lymphocytes Absolute 0.53 (L) 1.20 - 4.80 x10*3/uL    Monocytes Absolute 0.26 0.10 - 1.00 x10*3/uL    Eosinophils Absolute 0.00 0.00 - 0.70 x10*3/uL    Basophils Absolute 0.01 0.00 - 0.10 x10*3/uL   Renal Function Panel   Result Value Ref Range    Glucose 267 (H) 74 - 99 mg/dL    Sodium 132 (L) 136 - 145 mmol/L    Potassium 5.1 3.5 - 5.3 mmol/L    Chloride 94 (L) 98 - 107 mmol/L    Bicarbonate 29 21 - 32 mmol/L    Anion Gap 14 10 - 20 mmol/L    Urea Nitrogen 78 (H) 6 - 23 mg/dL    Creatinine 3.75 (H) 0.50 - 1.05 mg/dL    eGFR 13 (L) >60 mL/min/1.73m*2    Calcium 7.5 (L) 8.6 - 10.3 mg/dL    Phosphorus 6.0 (H) 2.5 - 4.9 mg/dL    Albumin 2.5 (L) 3.4 - 5.0 g/dL   Magnesium   Result Value Ref Range    Magnesium 1.92 1.60 - 2.40 mg/dL   Vancomycin   Result Value Ref Range    Vancomycin 17.9 5.0 - 20.0 ug/mL   Blood Gas Venous Full Panel   Result Value Ref Range    POCT pH, Venous 7.30 (L) 7.33 - 7.43 pH    POCT pCO2, Venous 63 (H) 41 - 51 mm Hg    POCT pO2, Venous 46 (H) 35 - 45 mm Hg    POCT SO2, Venous 76 (H) 45 - 75 %    POCT Oxy Hemoglobin, Venous 74.6 45.0 - 75.0 %    POCT Hematocrit Calculated, Venous 23.0 (L) 36.0 - 46.0 %    POCT Sodium, Venous 130 (L) 136 - 145 mmol/L    POCT Potassium, Venous 5.1 3.5 - 5.3 mmol/L    POCT Chloride, Venous 96 (L) 98 - 107 mmol/L    POCT Ionized Calicum, Venous 1.08 (L) 1.10 - 1.33 mmol/L    POCT Glucose, Venous 291 (H) 74 - 99 mg/dL    POCT Lactate,  Venous 0.6 0.4 - 2.0 mmol/L    POCT Base Excess, Venous 3.8 (H) -2.0 - 3.0 mmol/L    POCT HCO3 Calculated, Venous 31.0 (H) 22.0 - 26.0 mmol/L    POCT Hemoglobin, Venous 7.8 (L) 12.0 - 16.0 g/dL    POCT Anion Gap, Venous 8.0 (L) 10.0 - 25.0 mmol/L    Patient Temperature      FiO2 5 %   POCT GLUCOSE   Result Value Ref Range    POCT Glucose 279 (H) 74 - 99 mg/dL   POCT GLUCOSE   Result Value Ref Range    POCT Glucose 198 (H) 74 - 99 mg/dL       XR chest 1 view   Final Result   1. Large right pleural effusion similar to recent prior studies.   2. Pulmonary venous hypertension.   3. Cardiomegaly.   4. Probable left lower lobe infiltrate or atelectasis.                  MACRO:   None        Signed by: Mario Sampson 2/21/2024 11:57 AM   Dictation workstation:   [id number]      NM Lung perfusion with spect/ct   Final Result   There are no segmental or subsegmental perfusion defects in both   lungs indicating low probability for acute pulmonary embolism.   Moderate-sized right pleural effusion.             The interpretation above is based on modified PISAPED criteria.        This study was analyzed and interpreted at [place], [place].        Signed by: Rolando Dillon 2/19/2024 10:38 AM   Dictation workstation:   [id number]      Consult to Interventional Radiology    (Results Pending)   Bedside Midline Imaging    (Results Pending)       Transthoracic Echo (TTE) Complete    Result Date: 2/5/2024   [facility], [address] [place] [address]               Tel [phone] and Fax [phone] TRANSTHORACIC ECHOCARDIOGRAM REPORT  Patient Name:      MOE OTERO ZULMA      Reading Physician:    [id number] Nicholas Antonio MD Study Date:        2/5/2024             Ordering Provider:    [id number] DOMONIQUE PURCELL MRN/PID:           [id number]             Fellow:  Accession#:        [id number]         Nurse: Date of Birth/Age: 1955 / 68 years Sonographer:          Nina Angel                                                               RD Gender:            F                    Additional Staff: Height:            167.64 cm            Admit Date:           2/2/2024 Weight:            102.51 kg            Admission Status:     Inpatient -                                                               Routine BSA:               2.11 m2              Encounter#:           [id number]                                         Department Location:  [facility] Non                                                               Invasive Blood Pressure: 147 /65 mmHg Study Type:    TRANSTHORACIC ECHO (TTE) COMPLETE Diagnosis/ICD: Acute combined systolic (congestive) and diastolic (congestive)                heart failure (CHF)-I50.41 Indication:    Congestive Heart Failure CPT Code:      Echo Complete w Full [id number] Patient History: Diabetes:         Yes Pertinent         A-Fib, HTN, Dyspnea and LE Edema. Watchman device, elevated History:          BNP,. Study Detail: The following Echo studies were performed: 2D, M-Mode, Doppler and               color flow.  PHYSICIAN INTERPRETATION: Left Ventricle: The left ventricular systolic function is normal, with an estimated ejection fraction of 60-65%. There are no regional wall motion abnormalities. The left ventricular cavity size is normal. Spectral Doppler shows an impaired relaxation pattern of left ventricular diastolic filling. Left Atrium: The left atrium is moderately dilated. Right Ventricle: The right ventricle is mildly enlarged. There is mildly reduced right ventricular systolic function. Right Atrium: The right atrium is moderately dilated. Aortic Valve: The aortic valve is trileaflet. There is mild aortic valve cusp calcification. There is trivial aortic valve regurgitation. The peak instantaneous gradient of  the aortic valve is 14.1 mmHg. The mean gradient of the aortic valve is 9.1 mmHg. Mitral Valve: The mitral valve is normal in structure. There is mild mitral annular calcification. There is mild to moderate mitral valve regurgitation. Tricuspid Valve: The tricuspid valve is structurally normal. There is moderate tricuspid regurgitation. Pulmonic Valve: The pulmonic valve is structurally normal. There is mild pulmonic valve regurgitation. Pericardium: There is a small to moderate pericardial effusion posterior to the left ventricle. There is no evidence of cardiac tamponade. Aorta: The aortic root is normal. Pulmonary Artery: The tricuspid regurgitant velocity is 3.04 m/s, and with an estimated right atrial pressure of 3 mmHg, the estimated pulmonary artery pressure is mildly elevated with the RVSP at 39.9 mmHg. Pulmonary Veins: The pulmonary veins appear normal and return normally to the left atrium. Systemic Veins: The inferior vena cava appears to be of normal size. There is IVC inspiratory collapse greater than 50%.  CONCLUSIONS:  1. Left ventricular systolic function is normal with a 60-65% estimated ejection fraction.  2. Spectral Doppler shows an impaired relaxation pattern of left ventricular diastolic filling.  3. There is mildly reduced right ventricular systolic function.  4. The left atrium is moderately dilated.  5. The right atrium is moderately dilated.  6. There is a small to moderate pericardial effusion.  7. There is no evidence of cardiac tamponade.  8. Mild to moderate mitral valve regurgitation.  9. Moderate tricuspid regurgitation visualized. 10. Normal CVP. Estimated PASP around 45 mm Hg. QUANTITATIVE DATA SUMMARY: 2D MEASUREMENTS:                           Normal Ranges: IVSd:          1.52 cm    (0.6-1.1cm) LVPWd:         1.42 cm    (0.6-1.1cm) LVIDd:         4.35 cm    (3.9-5.9cm) LVIDs:         2.94 cm LV Mass Index: 120.7 g/m2 LV % FS        32.3 % LA VOLUME:                               Normal Ranges: LA Vol A4C:       79.5 ml    (22+/-6mL/m2) LA Vol A2C:       78.2 ml LA Vol BP:        79.7 ml LA Vol Index A4C: 37.7ml/m2 LA Vol Index A2C: 37.1 ml/m2 LA Vol Index BP:  37.9 ml/m2 LA Volume Index:  37.8 ml/m2 LA Vol A4C:       76.0 ml LA Vol A2C:       73.9 ml RA VOLUME BY A/L METHOD:                       Normal Ranges: RA Area A4C: 17.8 cm2 M-MODE MEASUREMENTS:                  Normal Ranges: Ao Root: 3.00 cm (2.0-3.7cm) LAs:     5.08 cm (2.7-4.0cm) LV SYSTOLIC FUNCTION BY 2D PLANIMETRY (MOD):                     Normal Ranges: EF-A4C View: 63.4 % (>=55%) EF-A2C View: 63.8 % EF-Biplane:  62.5 % LV DIASTOLIC FUNCTION:                             Normal Ranges: MV Peak E:      1.33 m/s    (0.7-1.2 m/s) MV Peak A:      0.53 m/s    (0.42-0.7 m/s) E/A Ratio:      2.52        (1.0-2.2) MV e'           0.06 m/s    (>8.0) MV lateral e'   0.06 m/s MV medial e'    0.06 m/s MV A Dur:       110.73 msec E/e' Ratio:     22.09       (<8.0) PulmV Sys Mike:  53.97 cm/s PulmV Dugan Mike: 77.63 cm/s PulmV S/D Mike:  0.70 MITRAL VALVE:                 Normal Ranges: MV DT: 245 msec (150-240msec) MITRAL INSUFFICIENCY:                         Normal Ranges: MR VTI:     171.38 cm MR Vmax:    489.73 cm/s MR Volume:  20.07 ml MR Flow Rt: 57.36 ml/s MR EROA:    0.12 cm2 AORTIC VALVE:                                    Normal Ranges: AoV Vmax:                1.88 m/s  (<=1.7m/s) AoV Peak P.1 mmHg (<20mmHg) AoV Mean P.1 mmHg  (1.7-11.5mmHg) LVOT Max Mike:            0.97 m/s  (<=1.1m/s) AoV VTI:                 46.22 cm  (18-25cm) LVOT VTI:                24.89 cm LVOT Diameter:           1.96 cm   (1.8-2.4cm) AoV Area, VTI:           1.62 cm2  (2.5-5.5cm2) AoV Area,Vmax:           1.56 cm2  (2.5-4.5cm2) AoV Dimensionless Index: 0.54  RIGHT VENTRICLE: RV Basal 3.80 cm RV Mid   2.80 cm RV Major 8.0 cm TAPSE:   18.0 mm RV s'    0.13 m/s TRICUSPID VALVE/RVSP:                             Normal Ranges:  Peak TR Velocity: 3.04 m/s RV Syst Pressure: 39.9 mmHg (< 30mmHg) PULMONIC VALVE:                      Normal Ranges: PV Max Mike: 1.0 m/s  (0.6-0.9m/s) PV Max PG:  3.9 mmHg Pulmonary Veins: PulmV Dugan Mike: 77.63 cm/s PulmV S/D Mike:  0.70 PulmV Sys Mkie:  53.97 cm/s  [id number] Nicholas Antonio MD Electronically signed on 2/5/2024 at 5:06:05 PM  ** Final **           Assessment/Plan   Principal Problem:    Heart failure (CMS/HCC)    1. Acute on chronic hypoxic/hypercapnic respiratory failure 2/2 acute on chronic diastolic CHF, influenza, COPD   -Isolation  -Tamiflu  -BNP 1444  -CXR showed CHF, Grossly stable pleural and parenchymal opacities in the mid and lower right hemithorax. Mild new left pleural effusion  -VQ scan showed moderate right pleural effusion  -bronchodilators  -Steroids  -oxygen/bipap support  -Significant leg to abd swelling and shortness of breath  -Strict I & Os  -Daily weights  -2gm na diet  -Echo as above  -Unable to start SGLT2 inhibitors with current GFR  -s/p RHC during last admit showed MEAN PA 30 MILD PULMONARY HTN MEAN RA 9 MM HG MEAN PCWP 23 MM HG CARDIAC OUTPUT 8.03 CI 3.79 NO SHUNTS   -Cont with Lasix gtt but most likely will need dialysis     --Still appears volume overloaded although respiratory status improving  -Monitor on tele     2. Elevated troponins-non MI elevation 2/2 influenza, respiratory distress  -Denies chest pain  -Does have shortness of breath-worsening for weeks  -Unable to have LHC due to creatinine  -Cont asa, plavix, statin, metoprolol  -Monitor on tele     3. Paroxysmal atrial fibrillation with RVR  -s/p Watchman in Aug 2023  -Currently SB/SR  -Cont amiodarone and metoprolol for rate control  -Monitor on tele     4. Hypertension  -stable  -2gm na diet  -cont meds  -monitor     5. Acute on chronic Anemia  -hgb down to 6.4, s/p PRBCs  -Hgb 7.5 today  -Monitor CBC  -Cont asa and plavix for now->hold if hgb conts to drop     6. Acute on Chronic kidney disease  -s/p  nephrectomy  -Renal US negative  -Had been seen by renal during previous admit  -Creatinine worsening and lower UOP with diuresis and still volume overloaded  -Renal consulted, note reviewed, possible dialysis depending on family and pt's wishes  -palliative care following     7. Diabetes  -ISS  -Accuchecks     8. Hypothyroidism  -Cont synthroid        Kacey Monique, APRN-CNP

## 2024-02-22 NOTE — PROGRESS NOTES
"Daphne Morris is a 68 y.o. female on day 3 of admission presenting with Heart failure (CMS/HCC).    Subjective   No acute events overnight. Tolerated being off BiPAP well. Patient continues to report improvement of symptoms and continues to inquire about when to go home. Resting comfortably on 5L, turned down to 3L this morning.     Objective     Physical Exam  Vitals reviewed.   Constitutional:       Appearance: She is ill-appearing.   Cardiovascular:      Rate and Rhythm: Normal rate and regular rhythm.      Heart sounds: Normal heart sounds. No murmur heard.  Pulmonary:      Breath sounds: No wheezing.      Comments: Lungs clear to auscultation bilaterally  Abdominal:      General: There is no distension.      Palpations: Abdomen is soft.      Tenderness: There is no abdominal tenderness.   Musculoskeletal:         General: Swelling present. No tenderness.      Right lower leg: Edema present.      Left lower leg: Edema present.   Skin:     Coloration: Skin is pale.   Neurological:      General: No focal deficit present.      Mental Status: She is alert and oriented to person, place, and time. Mental status is at baseline.         Last Recorded Vitals  Blood pressure 112/63, pulse 54, temperature 37.2 °C (99 °F), temperature source Temporal, resp. rate 19, height 1.676 m (5' 6\"), weight 98.5 kg (217 lb 2.5 oz), SpO2 95 %.  Intake/Output last 3 Shifts:  I/O last 3 completed shifts:  In: 2115.5 (21.5 mL/kg) [P.O.:940; I.V.:25.5 (0.3 mL/kg); Blood:1150]  Out: 550 (5.6 mL/kg) [Urine:550 (0.2 mL/kg/hr)]  Weight: 98.5 kg     Relevant Results  Scheduled medications  amiodarone, 200 mg, oral, Daily with breakfast  amLODIPine, 5 mg, oral, Daily  aspirin, 81 mg, oral, Daily  atorvastatin, 40 mg, oral, Daily  clopidogrel, 75 mg, oral, Daily  ferrous gluconate, 324 mg, oral, Daily with breakfast  [Held by provider] furosemide, 80 mg, oral, BID  [Held by provider] heparin (porcine), 5,000 Units, subcutaneous, q8h  [Held by " provider] insulin glargine, 14 Units, subcutaneous, Nightly  insulin lispro, 0-15 Units, subcutaneous, TID with meals  ipratropium-albuteroL, 3 mL, nebulization, TID  levothyroxine, 200 mcg, oral, Daily before breakfast  lidocaine, 5 mL, infiltration, Once  melatonin, 5 mg, oral, Daily  metoprolol succinate XL, 50 mg, oral, BID  oseltamivir, 30 mg, oral, Daily  pantoprazole, 40 mg, oral, BID  polyethylene glycol, 17 g, oral, Daily  potassium chloride CR, 20 mEq, oral, Daily  potassium chloride CR, 40 mEq, oral, Daily  predniSONE, 40 mg, oral, Daily  venlafaxine XR, 75 mg, oral, Daily      Continuous medications  furosemide, 10 mg/hr, Last Rate: 10 mg/hr (02/22/24 1248)      PRN medications  PRN medications: acetaminophen **OR** acetaminophen **OR** acetaminophen, acetaminophen **OR** acetaminophen **OR** acetaminophen, albuterol, dextrose 10 % in water (D10W), dextrose, diphenhydrAMINE, glucagon, ipratropium-albuteroL, ondansetron **OR** ondansetron, oxygen, oxygen    Results for orders placed or performed during the hospital encounter of 02/19/24 (from the past 24 hour(s))   POCT GLUCOSE   Result Value Ref Range    POCT Glucose 262 (H) 74 - 99 mg/dL   CBC and Auto Differential   Result Value Ref Range    WBC 3.6 (L) 4.4 - 11.3 x10*3/uL    nRBC 0.0 0.0 - 0.0 /100 WBCs    RBC 2.51 (L) 4.00 - 5.20 x10*6/uL    Hemoglobin 7.5 (L) 12.0 - 16.0 g/dL    Hematocrit 24.2 (L) 36.0 - 46.0 %    MCV 96 80 - 100 fL    MCH 29.9 26.0 - 34.0 pg    MCHC 31.0 (L) 32.0 - 36.0 g/dL    RDW 13.8 11.5 - 14.5 %    Platelets 251 150 - 450 x10*3/uL    Neutrophils % 77.0 40.0 - 80.0 %    Immature Granulocytes %, Automated 0.6 0.0 - 0.9 %    Lymphocytes % 14.8 13.0 - 44.0 %    Monocytes % 7.3 2.0 - 10.0 %    Eosinophils % 0.0 0.0 - 6.0 %    Basophils % 0.3 0.0 - 2.0 %    Neutrophils Absolute 2.75 1.20 - 7.70 x10*3/uL    Immature Granulocytes Absolute, Automated 0.02 0.00 - 0.70 x10*3/uL    Lymphocytes Absolute 0.53 (L) 1.20 - 4.80 x10*3/uL     Monocytes Absolute 0.26 0.10 - 1.00 x10*3/uL    Eosinophils Absolute 0.00 0.00 - 0.70 x10*3/uL    Basophils Absolute 0.01 0.00 - 0.10 x10*3/uL   Renal Function Panel   Result Value Ref Range    Glucose 267 (H) 74 - 99 mg/dL    Sodium 132 (L) 136 - 145 mmol/L    Potassium 5.1 3.5 - 5.3 mmol/L    Chloride 94 (L) 98 - 107 mmol/L    Bicarbonate 29 21 - 32 mmol/L    Anion Gap 14 10 - 20 mmol/L    Urea Nitrogen 78 (H) 6 - 23 mg/dL    Creatinine 3.75 (H) 0.50 - 1.05 mg/dL    eGFR 13 (L) >60 mL/min/1.73m*2    Calcium 7.5 (L) 8.6 - 10.3 mg/dL    Phosphorus 6.0 (H) 2.5 - 4.9 mg/dL    Albumin 2.5 (L) 3.4 - 5.0 g/dL   Magnesium   Result Value Ref Range    Magnesium 1.92 1.60 - 2.40 mg/dL   Vancomycin   Result Value Ref Range    Vancomycin 17.9 5.0 - 20.0 ug/mL   Blood Gas Venous Full Panel   Result Value Ref Range    POCT pH, Venous 7.30 (L) 7.33 - 7.43 pH    POCT pCO2, Venous 63 (H) 41 - 51 mm Hg    POCT pO2, Venous 46 (H) 35 - 45 mm Hg    POCT SO2, Venous 76 (H) 45 - 75 %    POCT Oxy Hemoglobin, Venous 74.6 45.0 - 75.0 %    POCT Hematocrit Calculated, Venous 23.0 (L) 36.0 - 46.0 %    POCT Sodium, Venous 130 (L) 136 - 145 mmol/L    POCT Potassium, Venous 5.1 3.5 - 5.3 mmol/L    POCT Chloride, Venous 96 (L) 98 - 107 mmol/L    POCT Ionized Calicum, Venous 1.08 (L) 1.10 - 1.33 mmol/L    POCT Glucose, Venous 291 (H) 74 - 99 mg/dL    POCT Lactate, Venous 0.6 0.4 - 2.0 mmol/L    POCT Base Excess, Venous 3.8 (H) -2.0 - 3.0 mmol/L    POCT HCO3 Calculated, Venous 31.0 (H) 22.0 - 26.0 mmol/L    POCT Hemoglobin, Venous 7.8 (L) 12.0 - 16.0 g/dL    POCT Anion Gap, Venous 8.0 (L) 10.0 - 25.0 mmol/L    Patient Temperature      FiO2 5 %   POCT GLUCOSE   Result Value Ref Range    POCT Glucose 279 (H) 74 - 99 mg/dL   POCT GLUCOSE   Result Value Ref Range    POCT Glucose 198 (H) 74 - 99 mg/dL     Assessment/Plan   #Acute on chronic hypoxic hypercapnic respiratory failure 2/2 HFpEF exacerbation, COPD exacerbation  #Respiratory acidosis requiring  BiPAP  #Influenza B positive  #Rule out pneumonia  - Currently on 3L, wean O2 as tolerated to baseline 2L.   - Continue prednisone 40 mg daily  - Scheduled DuoNebs  - Continue Tamiflu  - VBG this AM showed mild acidosis, however patient does not need BiPAP at this time due to improved mental status.  - Recommending to hold diuresis as patient is not responding appropriately to it and kidney function continues to worsen  - Hospice meeting planned for this upcoming Saturday     Hailey Sue MD

## 2024-02-22 NOTE — PROGRESS NOTES
Daphne Morris is a 68 y.o. female on day 3 of admission presenting with Heart failure (CMS/HCC).      Subjective   Seen and examined, no new complaints.  No overnight events.  The plan discussed with the patient and RN.       Objective     Last Recorded Vitals  /51   Pulse 55   Temp 36.8 °C (98.2 °F) (Temporal)   Resp 18   Wt 98.5 kg (217 lb 2.5 oz)   SpO2 96%   Intake/Output last 3 Shifts:    Intake/Output Summary (Last 24 hours) at 2/22/2024 0956  Last data filed at 2/22/2024 0500  Gross per 24 hour   Intake 496.3 ml   Output 200 ml   Net 296.3 ml         Admission Weight  Weight: 95.4 kg (210 lb 5.1 oz) (02/19/24 0438)    Daily Weight  02/22/24 : 98.5 kg (217 lb 2.5 oz)    Image Results  XR chest 1 view  Narrative: Interpreted By:  Mario Sampson,   STUDY:  XR CHEST 1 VIEW;  2/21/2024 11:37 am      INDICATION:  Signs/Symptoms:SOB.      COMPARISON:  02/18/2024 AP and 02/13/2024 AP and lateral chest x-rays and the  02/01/2024 unenhanced thoracic CT.      ACCESSION NUMBER(S):  Multilevel spinal degenerative changes are noted. [id number]      ORDERING CLINICIAN:  JIM ADORNO      FINDINGS:  Portable AP upright chest x-ray 02/21/2024:              CARDIOMEDIASTINAL SILHOUETTE:  The heart seems enlarged, but its size is likely to be exaggerated by  the AP lordotic projection. Vascular calcification and a left atrial  appendage Watchman device are noted. Mild coronary artery  calcification demonstrated on CT can not be appreciated.      LUNGS:  A large right pleural effusion demonstrated on the above studies  markedly limits evaluation of the right lung. New left retrocardiac  opacity suggests lower lobe infiltrate or atelectasis as the left  lateral costophrenic angle is sharp. Pulmonary vascular cephalization  suggests congestive cardiac failure, fluid overload or uremia. Refer  to the 02/01/2024 CT report for further details.      ABDOMEN:  No remarkable upper abdominal findings.       BONES:  No acute osseous changes. Mild multilevel spinal degenerative changes  are noted.      Impression: 1. Large right pleural effusion similar to recent prior studies.  2. Pulmonary venous hypertension.  3. Cardiomegaly.  4. Probable left lower lobe infiltrate or atelectasis.              MACRO:  None      Signed by: Mario Sampson 2/21/2024 11:57 AM  Dictation workstation:   [id number]      Physical Exam  Constitutional: Well developed, awake/alert/oriented x3, no distress, alert and cooperative  Eyes: PERRL, EOMI, clear sclera  ENMT: mucous membranes moist, no apparent injury, no lesions seen  Head/Neck: Neck supple, no apparent injury, thyroid without mass or tenderness, No JVD, trachea midline, no bruits  Respiratory/Thorax: Patent airways, CTAB, normal breath sounds with good chest expansion, thorax symmetric  Cardiovascular: Regular, rate and rhythm, no murmurs, 2+ equal pulses of the extremities, normal S 1and S 2  Gastrointestinal: Nondistended, soft, non-tender, no rebound tenderness or guarding, no masses palpable, no organomegaly, +BS, no bruits  Musculoskeletal: ROM intact, no joint swelling, normal strength  Extremities: normal extremities, no cyanosis edema, contusions or wounds, no clubbing  Neurological: alert and oriented x3, intact senses, motor, response and reflexes, normal strength  Lymphatic: No significant lymphadenopathy  Psychological: Appropriate mood and behavior  Skin: Warm and dry, no lesions, no rashes  Relevant Results      Scheduled medications  amiodarone, 200 mg, oral, Daily with breakfast  amLODIPine, 5 mg, oral, Daily  aspirin, 81 mg, oral, Daily  atorvastatin, 40 mg, oral, Daily  clopidogrel, 75 mg, oral, Daily  ferrous gluconate, 324 mg, oral, Daily with breakfast  [Held by provider] furosemide, 80 mg, oral, BID  [Held by provider] heparin (porcine), 5,000 Units, subcutaneous, q8h  [Held by provider] insulin glargine, 14 Units, subcutaneous, Nightly  insulin lispro,  0-15 Units, subcutaneous, TID with meals  ipratropium-albuteroL, 3 mL, nebulization, TID  levothyroxine, 200 mcg, oral, Daily before breakfast  lidocaine, 5 mL, infiltration, Once  melatonin, 5 mg, oral, Daily  metoprolol succinate XL, 50 mg, oral, BID  oseltamivir, 30 mg, oral, Daily  pantoprazole, 40 mg, oral, BID  polyethylene glycol, 17 g, oral, Daily  potassium chloride CR, 20 mEq, oral, Daily  potassium chloride CR, 40 mEq, oral, Daily  predniSONE, 40 mg, oral, Daily  vancomycin, 500 mg, intravenous, Once  venlafaxine XR, 75 mg, oral, Daily      Continuous medications  furosemide, 10 mg/hr, Last Rate: 10 mg/hr (02/21/24 1207)      PRN medications  PRN medications: acetaminophen **OR** acetaminophen **OR** acetaminophen, acetaminophen **OR** acetaminophen **OR** acetaminophen, albuterol, dextrose 10 % in water (D10W), dextrose, diphenhydrAMINE, glucagon, ipratropium-albuteroL, ondansetron **OR** ondansetron, oxygen, oxygen          Results for orders placed or performed during the hospital encounter of 02/19/24 (from the past 24 hour(s))   POCT GLUCOSE   Result Value Ref Range    POCT Glucose 154 (H) 74 - 99 mg/dL   POCT GLUCOSE   Result Value Ref Range    POCT Glucose 262 (H) 74 - 99 mg/dL   CBC and Auto Differential   Result Value Ref Range    WBC 3.6 (L) 4.4 - 11.3 x10*3/uL    nRBC 0.0 0.0 - 0.0 /100 WBCs    RBC 2.51 (L) 4.00 - 5.20 x10*6/uL    Hemoglobin 7.5 (L) 12.0 - 16.0 g/dL    Hematocrit 24.2 (L) 36.0 - 46.0 %    MCV 96 80 - 100 fL    MCH 29.9 26.0 - 34.0 pg    MCHC 31.0 (L) 32.0 - 36.0 g/dL    RDW 13.8 11.5 - 14.5 %    Platelets 251 150 - 450 x10*3/uL    Neutrophils % 77.0 40.0 - 80.0 %    Immature Granulocytes %, Automated 0.6 0.0 - 0.9 %    Lymphocytes % 14.8 13.0 - 44.0 %    Monocytes % 7.3 2.0 - 10.0 %    Eosinophils % 0.0 0.0 - 6.0 %    Basophils % 0.3 0.0 - 2.0 %    Neutrophils Absolute 2.75 1.20 - 7.70 x10*3/uL    Immature Granulocytes Absolute, Automated 0.02 0.00 - 0.70 x10*3/uL     Lymphocytes Absolute 0.53 (L) 1.20 - 4.80 x10*3/uL    Monocytes Absolute 0.26 0.10 - 1.00 x10*3/uL    Eosinophils Absolute 0.00 0.00 - 0.70 x10*3/uL    Basophils Absolute 0.01 0.00 - 0.10 x10*3/uL   Renal Function Panel   Result Value Ref Range    Glucose 267 (H) 74 - 99 mg/dL    Sodium 132 (L) 136 - 145 mmol/L    Potassium 5.1 3.5 - 5.3 mmol/L    Chloride 94 (L) 98 - 107 mmol/L    Bicarbonate 29 21 - 32 mmol/L    Anion Gap 14 10 - 20 mmol/L    Urea Nitrogen 78 (H) 6 - 23 mg/dL    Creatinine 3.75 (H) 0.50 - 1.05 mg/dL    eGFR 13 (L) >60 mL/min/1.73m*2    Calcium 7.5 (L) 8.6 - 10.3 mg/dL    Phosphorus 6.0 (H) 2.5 - 4.9 mg/dL    Albumin 2.5 (L) 3.4 - 5.0 g/dL   Magnesium   Result Value Ref Range    Magnesium 1.92 1.60 - 2.40 mg/dL   Vancomycin   Result Value Ref Range    Vancomycin 17.9 5.0 - 20.0 ug/mL   Blood Gas Venous Full Panel   Result Value Ref Range    POCT pH, Venous 7.30 (L) 7.33 - 7.43 pH    POCT pCO2, Venous 63 (H) 41 - 51 mm Hg    POCT pO2, Venous 46 (H) 35 - 45 mm Hg    POCT SO2, Venous 76 (H) 45 - 75 %    POCT Oxy Hemoglobin, Venous 74.6 45.0 - 75.0 %    POCT Hematocrit Calculated, Venous 23.0 (L) 36.0 - 46.0 %    POCT Sodium, Venous 130 (L) 136 - 145 mmol/L    POCT Potassium, Venous 5.1 3.5 - 5.3 mmol/L    POCT Chloride, Venous 96 (L) 98 - 107 mmol/L    POCT Ionized Calicum, Venous 1.08 (L) 1.10 - 1.33 mmol/L    POCT Glucose, Venous 291 (H) 74 - 99 mg/dL    POCT Lactate, Venous 0.6 0.4 - 2.0 mmol/L    POCT Base Excess, Venous 3.8 (H) -2.0 - 3.0 mmol/L    POCT HCO3 Calculated, Venous 31.0 (H) 22.0 - 26.0 mmol/L    POCT Hemoglobin, Venous 7.8 (L) 12.0 - 16.0 g/dL    POCT Anion Gap, Venous 8.0 (L) 10.0 - 25.0 mmol/L    Patient Temperature      FiO2 5 %   POCT GLUCOSE   Result Value Ref Range    POCT Glucose 279 (H) 74 - 99 mg/dL      NM Lung perfusion with spect/ct    Result Date: 2/19/2024  Interpreted By:  Rolando Dillon and Osman Sena STUDY: NM LUNG PERFUSION WITH SPECT/CT;  2/19/2024 9:58 am    INDICATION: Signs/Symptoms: 68-year-old female presented with known congestive heart failure. R/o pulmoanry embolism.   COMPARISON: Chest x-ray from 02/18/2024   ACCESSION NUMBER(S): [id number]   ORDERING CLINICIAN: SARAH LEMOS   TECHNIQUE: DIVISION OF NUCLEAR MEDICINE PERFUSION LUNG SCANS   Multiple perfusion images of the lungs were acquired after the intravenous administration of 4.0 mCi of Tc-99m macroaggregated albumin (MAA). In addition,SPECT/CT of the chest was performed.   FINDINGS: Perfusion images of both lungs demonstrate mild heterogeneity throughout the lung fields bilaterally. There are no distinct segmental or subsegmental perfusion defects. Fissure sign seen at right lung compatible with right pleural effusion.       There are no segmental or subsegmental perfusion defects in both lungs indicating low probability for acute pulmonary embolism. Moderate-sized right pleural effusion.     The interpretation above is based on modified PISAPED criteria.   This study was analyzed and interpreted at [place], [place].   Signed by: Rolando Dillon 2/19/2024 10:38 AM Dictation workstation:   [id number]    ECG 12 Lead    Result Date: 2/19/2024  Junctional rhythm Left axis deviation Inferior infarct , age undetermined Anterolateral infarct (cited on or before 01-FEB-2024) Abnormal ECG When compared with ECG of 01-FEB-2024 02:46, Junctional rhythm has replaced Sinus rhythm Questionable change in initial forces of Lateral leads    Vascular US lower extremity venous duplex bilateral    Result Date: 2/18/2024  Interpreted By:  Washington Peraza, STUDY: [facility] US LOWER EXTREMITY VENOUS DUPLEX BILATERAL  2/18/2024 8:09 pm   INDICATION: 69 y/o   F with  Signs/Symptoms:d dimer > 13,000. LMP:  Unknown.   COMPARISON: None.   ACCESSION NUMBER(S): [id number]   ORDERING CLINICIAN: RAINER BARR   TECHNIQUE: Routine ultrasound of the  bilateral lower extremity was performed with duplex Doppler (color  and spectral) evaluation.   Static images were obtained for remote interpretation.   FINDINGS: THIGH VEINS:  The common femoral, femoral, popliteal, proximal medial saphenous, and deep femoral veins are patent and free of thrombus. The veins are normally compressible.  They demonstrate normal phasic flow and augmentation response.   CALF VEINS: Significant soft tissue swelling somewhat limits assessment.       Negative study.  No deep venous thrombosis of the  bilateral lower extremity.  Soft tissue swelling limits assessment   MACRO: None   Signed by: Washington Peraza 2/18/2024 8:41 PM Dictation workstation:   [id number]    XR chest 1 view    Result Date: 2/18/2024  Interpreted By:  Erlin Lui, STUDY: XR CHEST 1 VIEW;  2/18/2024 4:32 pm   INDICATION: Signs/Symptoms:Dyspnea history of CHF.   COMPARISON: CT scan from 02/01/2024.   ACCESSION NUMBER(S): [id number]   ORDERING CLINICIAN: RAINER BARR   TECHNIQUE: Single AP portable view of the chest was obtained.   FINDINGS: MEDIASTINUM/ LUNGS/ TAMMIE: Cardiomegaly. Pulmonary vasculature and interstitium are prominent and indistinct. Slight new pleural and parenchymal opacities at the lateral left lung base. Persistent pleural and parenchymal opacities in the mid and lower right hemithorax. These are grossly stable. Stable calcification in the aorta.   No pneumothorax. No tracheal deviation. No abnormal hilar fullness or gross mass on either side.   BONES: No lytic or blastic destructive bone lesion.  There is mild-to-moderate disc space narrowing and endplate osteophytosis throughout the thoracic spine.   UPPER ABDOMEN: Grossly intact.       Findings of ongoing CHF.   Grossly stable pleural and parenchymal opacities in the mid and lower right hemithorax. Mild new left pleural effusion with left basilar atelectasis or edema.   MACRO: None   Signed by: Erlin Lui 2/18/2024 4:59 PM Dictation workstation:   [id number]    XR chest 2 views    Result Date:  2/14/2024  Interpreted By:  Amando Starks, STUDY: XR CHEST 2 VIEWS; 2/13/2024 8:25 am   INDICATION: Signs/Symptoms:pleural effusion   COMPARISON: Radiographs 02/09/2024   ACCESSION NUMBER(S): [id number]   ORDERING CLINICIAN: TONIA MONTANA   TECHNIQUE: Frontal and lateral radiographs of the chest were obtained.   FINDINGS: LINES AND DEVICES: None.   LUNGS: Stable moderate to large right pleural effusion with adjacent atelectasis. No new focal consolidation, left pleural effusion or pneumothorax.   CARDIOMEDIASTINAL SILHOUETTE: Partially obscured right heart border by adjacent effusion.   OTHER: No acute osseous abnormality.       Stable moderate to large right pleural effusion.   MACRO None   Signed by: Amando Starks 2/14/2024 9:50 AM Dictation workstation:   [id number]    XR chest 1 view    Result Date: 2/9/2024  Interpreted By:  Sukumar Daugherty, STUDY: XR CHEST 1 VIEW;  2/9/2024 1:13 pm   INDICATION: Signs/Symptoms:fever.   COMPARISON: 02/02/2024   ACCESSION NUMBER(S): [id number]   ORDERING CLINICIAN: TONIA MONTANA   FINDINGS: Significantly increased large right pleural effusion with overlying atelectasis. Left lung grossly clear. Cardiomediastinal silhouette unchanged. Aortic atherosclerosis. Pulmonary vascular congestion.       Large right pleural effusion, significantly increased compared to 1 week ago.     Signed by: Sukumar Daugherty 2/9/2024 3:56 PM Dictation workstation:   [id number]    Cardiac catheterization - non-coronary    Result Date: 2/7/2024   [facility], Cath Lab, [address] [place] [address] Cardiovascular Catheterization Report Patient Name:      MOE MAYA      Performing Physician:  Tobin Antonio MD Study Date:        2/7/2024             Verifying Physician:   Tobin Antonio MD MRN/PID:           [id number]              Cardiologist/Co-scrub: Accession#:        [id number]         Ordering Physician:    [id number] DOMONIQUE PURCELL Date of Birth/Age: 1955 / 68 years Fellow: Gender:            F                    Fellow: Encounter#:        [id number]  Study: Right Heart Cath  Indications: MOE MAYA is a 69 year old female who presents with dyslipidemia, hypertension, renal failure, atrial fibrillation, diabetes, peripheral artery disease, chronic pulmonary disease, obesity and Tobacco Use - Current. Heart failure. Pulmonary hypertension and heart failure.  Procedure Description: After infiltration of local anesthetic, the right internal jugular vein was identified with two-dimensional ultrasound. Under direct ultrasound visualization, the right internal jugular vein was cannulated with a micropuncture technique. A 7 Belgian sheath was placed in the vein. A balloon tipped catheter was advanced through the right heart to record pressures. Cardiac output was calculated via the Steph method.  Right Heart Catheterization: A balloon tipped catheter was advanced through the right heart to record pressures. Cardiac output was calculated via the Steph method. Elevated left sided filling pressures with normal cardiac output. Cardiac output is normal. Preserved cardiac output at rest. No evidence of shunt. Elevated pulmonary vascular resistance. Elevated systemic vascular resistance. Pulmonary venous hypertension and pulmonary arterial hypertension.  Hemo Personnel: +-------------------------+---------+ Name                     Duty      +-------------------------+---------+ Nicholas Antonio MD 1 +-------------------------+---------+  +----------+ Contrast:  +----------+ Omnipaque: +----------+  Hemodynamic Pressures:  +----+---------+----------+------------+-------------+---+-----+-------+-------+ SiteDate TimePhase Name  Systolic    Diastolic   ED Mean A-Wave V-Wave                             mmHg        mmHg     mmHmmHg  mmHg   mmHg                                                    g                     +----+---------+----------+------------+-------------+---+-----+-------+-------+   RA 2/7/2024  AIR REST                                9     13     11       2:16:48                                                                     PM                                                          +----+---------+----------+------------+-------------+---+-----+-------+-------+   RV 2/7/2024  AIR REST          56            3 14                          2:17:18                                                                     PM                                                          +----+---------+----------+------------+-------------+---+-----+-------+-------+   PA 2/7/2024  AIR REST          55           15      30                     2:21:53                                                                     PM                                                          +----+---------+----------+------------+-------------+---+-----+-------+-------+   PW 2/7/2024  AIR REST                               23     20     35       2:23:01                                                                     PM                                                          +----+---------+----------+------------+-------------+---+-----+-------+-------+   PW 2/7/2024  AIR REST                               23     20     38       2:23:28                                                                     PM                                                          +----+---------+----------+------------+-------------+---+-----+-------+-------+   PA 2/7/2024  AIR REST          57           15       33                     2:24:12                                                                     PM                                                          +----+---------+----------+------------+-------------+---+-----+-------+-------+   AO 2/7/2024  AIR REST         176           62      -2                     2:28:01                                                                     PM                                                          +----+---------+----------+------------+-------------+---+-----+-------+-------+  Oxygen Saturation %: +-----------+----------+------------+ Sample SiteO2 Sat (%)HB (g/100ml) +-----------+----------+------------+          FA        94         8.6 +-----------+----------+------------+          RA        64         8.6 +-----------+----------+------------+          FA        94         8.6 +-----------+----------+------------+          PA        60         8.6 +-----------+----------+------------+  Cardiac Outputs: +---------------+------------------+-------+ ISABELLE CO (l/min)ISABELLE CI (l/min/m2)ISABLELE SV +---------------+------------------+-------+             8.0               3.8  138.5 +---------------+------------------+-------+  Vascular Resistance Calculated Values (Wood Units): +-----+---+----+---+----+ PhasePVRPVRITPRTPRI +-----+---+----+---+----+ 0    1.02.1 4.29.0  +-----+---+----+---+----+  Complications: No in-lab complications observed.  Cardiac Cath Post Procedure Notes: Post Procedure           Moderately elevated PCWP, normal cardiac output, no Diagnosis:               shunts. Blood Loss:              Estimated blood loss during the procedure was 0 mls. Specimens Removed:       Number of specimen(s) removed: none.  Recommendations: Maximize medical therapy. Agressive risk factor modification efforts. Optimize volume status with diuretics. Renal replacement therapy.  Patient counseled and advised to discontinue smoking. ____________________________________________________________________________________ CONCLUSIONS:  1. RHC shows moderate pulmonary HTN, mPAP 30-33 mm Hg, mRA 9 mm Hg, normal cardiac output and no shunts.  2. Moderately elevated PCWP. ICD 10 Codes: Acute diastolic (congestive) heart failure (CHF)-I50.31  CPT Codes: Right Heart Cath O2/Cardiac output without biopsy (RHC)-28542; Moderate Sedation Services 1st additional 15 minutes patient >5 years-75838; Luis Crook-[id number]  [id number] Nicholas Antonio MD Performing Physician Electronically signed by [id number] Nicholas Antonio MD on 2/7/2024 at 5:02:34 PM  ** Final **     Transthoracic Echo (TTE) Complete    Result Date: 2/5/2024   [facility], [address] [place] [address]               Tel [phone] and Fax [phone] TRANSTHORACIC ECHOCARDIOGRAM REPORT  Patient Name:      MOE OTERO ZULMA Lizarraga Physician:    [id number] Nicholas Antonio MD Study Date:        2/5/2024             Ordering Provider:    [id number] DOMONIQUE PURCELL MRN/PID:           [id number]             Fellow: Accession#:        [id number]         Nurse: Date of Birth/Age: 1955 / 68 years Sonographer:          Nina Angel RDCS Gender:            F                    Additional Staff: Height:            167.64 cm            Admit Date:           2/2/2024 Weight:            102.51 kg            Admission Status:     Inpatient -                                                               Routine BSA:               2.11 m2              Encounter#:           [id number]                                         Department Location:  [facility] Non                                                               Invasive Blood Pressure:  147 /65 mmHg Study Type:    TRANSTHORACIC ECHO (TTE) COMPLETE Diagnosis/ICD: Acute combined systolic (congestive) and diastolic (congestive)                heart failure (CHF)-I50.41 Indication:    Congestive Heart Failure CPT Code:      Echo Complete w Full Doppler-78540 Patient History: Diabetes:         Yes Pertinent         A-Fib, HTN, Dyspnea and LE Edema. Watchman device, elevated History:          BNP,. Study Detail: The following Echo studies were performed: 2D, M-Mode, Doppler and               color flow.  PHYSICIAN INTERPRETATION: Left Ventricle: The left ventricular systolic function is normal, with an estimated ejection fraction of 60-65%. There are no regional wall motion abnormalities. The left ventricular cavity size is normal. Spectral Doppler shows an impaired relaxation pattern of left ventricular diastolic filling. Left Atrium: The left atrium is moderately dilated. Right Ventricle: The right ventricle is mildly enlarged. There is mildly reduced right ventricular systolic function. Right Atrium: The right atrium is moderately dilated. Aortic Valve: The aortic valve is trileaflet. There is mild aortic valve cusp calcification. There is trivial aortic valve regurgitation. The peak instantaneous gradient of the aortic valve is 14.1 mmHg. The mean gradient of the aortic valve is 9.1 mmHg. Mitral Valve: The mitral valve is normal in structure. There is mild mitral annular calcification. There is mild to moderate mitral valve regurgitation. Tricuspid Valve: The tricuspid valve is structurally normal. There is moderate tricuspid regurgitation. Pulmonic Valve: The pulmonic valve is structurally normal. There is mild pulmonic valve regurgitation. Pericardium: There is a small to moderate pericardial effusion posterior to the left ventricle. There is no evidence of cardiac tamponade. Aorta: The aortic root is normal. Pulmonary Artery: The tricuspid regurgitant velocity is 3.04 m/s, and with an estimated  right atrial pressure of 3 mmHg, the estimated pulmonary artery pressure is mildly elevated with the RVSP at 39.9 mmHg. Pulmonary Veins: The pulmonary veins appear normal and return normally to the left atrium. Systemic Veins: The inferior vena cava appears to be of normal size. There is IVC inspiratory collapse greater than 50%.  CONCLUSIONS:  1. Left ventricular systolic function is normal with a 60-65% estimated ejection fraction.  2. Spectral Doppler shows an impaired relaxation pattern of left ventricular diastolic filling.  3. There is mildly reduced right ventricular systolic function.  4. The left atrium is moderately dilated.  5. The right atrium is moderately dilated.  6. There is a small to moderate pericardial effusion.  7. There is no evidence of cardiac tamponade.  8. Mild to moderate mitral valve regurgitation.  9. Moderate tricuspid regurgitation visualized. 10. Normal CVP. Estimated PASP around 45 mm Hg. QUANTITATIVE DATA SUMMARY: 2D MEASUREMENTS:                           Normal Ranges: IVSd:          1.52 cm    (0.6-1.1cm) LVPWd:         1.42 cm    (0.6-1.1cm) LVIDd:         4.35 cm    (3.9-5.9cm) LVIDs:         2.94 cm LV Mass Index: 120.7 g/m2 LV % FS        32.3 % LA VOLUME:                              Normal Ranges: LA Vol A4C:       79.5 ml    (22+/-6mL/m2) LA Vol A2C:       78.2 ml LA Vol BP:        79.7 ml LA Vol Index A4C: 37.7ml/m2 LA Vol Index A2C: 37.1 ml/m2 LA Vol Index BP:  37.9 ml/m2 LA Volume Index:  37.8 ml/m2 LA Vol A4C:       76.0 ml LA Vol A2C:       73.9 ml RA VOLUME BY A/L METHOD:                       Normal Ranges: RA Area A4C: 17.8 cm2 M-MODE MEASUREMENTS:                  Normal Ranges: Ao Root: 3.00 cm (2.0-3.7cm) LAs:     5.08 cm (2.7-4.0cm) LV SYSTOLIC FUNCTION BY 2D PLANIMETRY (MOD):                     Normal Ranges: EF-A4C View: 63.4 % (>=55%) EF-A2C View: 63.8 % EF-Biplane:  62.5 % LV DIASTOLIC FUNCTION:                             Normal Ranges: MV Peak E:       1.33 m/s    (0.7-1.2 m/s) MV Peak A:      0.53 m/s    (0.42-0.7 m/s) E/A Ratio:      2.52        (1.0-2.2) MV e'           0.06 m/s    (>8.0) MV lateral e'   0.06 m/s MV medial e'    0.06 m/s MV A Dur:       110.73 msec E/e' Ratio:     22.09       (<8.0) PulmV Sys Mike:  53.97 cm/s PulmV Dugan Mike: 77.63 cm/s PulmV S/D Mike:  0.70 MITRAL VALVE:                 Normal Ranges: MV DT: 245 msec (150-240msec) MITRAL INSUFFICIENCY:                         Normal Ranges: MR VTI:     171.38 cm MR Vmax:    489.73 cm/s MR Volume:  20.07 ml MR Flow Rt: 57.36 ml/s MR EROA:    0.12 cm2 AORTIC VALVE:                                    Normal Ranges: AoV Vmax:                1.88 m/s  (<=1.7m/s) AoV Peak P.1 mmHg (<20mmHg) AoV Mean P.1 mmHg  (1.7-11.5mmHg) LVOT Max Mike:            0.97 m/s  (<=1.1m/s) AoV VTI:                 46.22 cm  (18-25cm) LVOT VTI:                24.89 cm LVOT Diameter:           1.96 cm   (1.8-2.4cm) AoV Area, VTI:           1.62 cm2  (2.5-5.5cm2) AoV Area,Vmax:           1.56 cm2  (2.5-4.5cm2) AoV Dimensionless Index: 0.54  RIGHT VENTRICLE: RV Basal 3.80 cm RV Mid   2.80 cm RV Major 8.0 cm TAPSE:   18.0 mm RV s'    0.13 m/s TRICUSPID VALVE/RVSP:                             Normal Ranges: Peak TR Velocity: 3.04 m/s RV Syst Pressure: 39.9 mmHg (< 30mmHg) PULMONIC VALVE:                      Normal Ranges: PV Max Mike: 1.0 m/s  (0.6-0.9m/s) PV Max PG:  3.9 mmHg Pulmonary Veins: PulmV Dugan Mike: 77.63 cm/s PulmV S/D Mike:  0.70 PulmV Sys Mike:  53.97 cm/s  [id number] Nicholas Antonio MD Electronically signed on 2024 at 5:06:05 PM  ** Final **     XR knee left 4+ views    Result Date: 2024  Interpreted By:  Adama Jonas, STUDY: Left knee dated  2024.   INDICATION: Signs/Symptoms:left kne pain   COMPARISON: Radiographs dated 10/23/2018.   ACCESSION NUMBER(S): [id number]   ORDERING CLINICIAN: JOSUÉ REICH   TECHNIQUE: Four views of the left knee.   FINDINGS: No  fracture or dislocation is evident.  There is a small knee joint effusion. There is moderate tricompartmental degenerative change of the knee. Reticular increased density is seen in the subcutaneous tissues.       1. Small joint effusion and degenerative change of the knee without osseous injury evident. Knowledge of any trauma may be helpful. If there is persistent concern for osseous injury, cross-sectional imaging can be considered. 2. Reticular increased density in the subcutaneous tissues which may represent lymphedema and/or cellulitis.   MACRO: None   Signed by: Adama Jonas 2/2/2024 4:24 PM Dictation workstation:   [id number]    XR chest 1 view    Result Date: 2/2/2024  Interpreted By:  Rayna Carson, STUDY: XR CHEST 1 VIEW;  2/2/2024 2:29 pm   INDICATION: Signs/Symptoms:S/p thoracentesis.   COMPARISON: 02/01/2024   ACCESSION NUMBER(S): [id number]   ORDERING CLINICIAN: AIDAN HERNANDEZ   TECHNIQUE: Portable AP upright   FINDINGS: The cardiac silhouette is enlarged but unchanged. Aorta is atherosclerotic. There is marked improvement in the right pleural effusion since the prior study as a result of interval thoracentesis. No pneumothorax is present. There is minimal fluid residual blunting the costophrenic angle. There is minimal atelectasis at the right lung base. Pulmonary vasculature appears congested. No interval change is noted in the osseous structures.       Marked interval improvement in the right pleural effusion with interval thoracentesis. No pneumothorax.   Pulmonary vascular congestion   Minimal atelectasis right lung base   MACRO: None.   Signed by: Rayna Carson 2/2/2024 3:23 PM Dictation workstation:   [id number]    US renal complete    Result Date: 2/2/2024  Interpreted By:  Amando Starks, STUDY: US RENAL COMPLETE; 2/2/2024 12:23 pm   INDICATION: Signs/Symptoms:ANDREA on CKD.   COMPARISON: Renal ultrasound 05/01/2023   ACCESSION NUMBER(S): [id number]   ORDERING CLINICIAN: JESSICA  MICH   TECHNIQUE: Sonography of the kidneys and urinary bladder was performed.   FINDINGS: Right Kidney: Right total nephrectomy.   Left Kidney: *Renal length: 10.6 cm *Parenchyma: Normal parenchymal echogenicity. Normal parenchymal thickness. *Collecting system: No hydronephrosis. *Calculus: No echogenic, shadowing calculus. *Lesion: None.   Bladder: Normal sonographic appearance.       No left hydronephrosis. Right total nephrectomy.   MACRO: None   Signed by: Amando Starks 2/2/2024 2:36 PM Dictation workstation:   [id number]    ECG 12 lead    Result Date: 2/1/2024  Sinus bradycardia Left axis deviation Low voltage QRS Possible Anterolateral infarct , age undetermined Abnormal ECG When compared with ECG of 19-JUL-2023 14:12, Previous ECG has undetermined rhythm, needs review Left bundle branch block is no longer Present Borderline criteria for Anterior infarct are now Present Borderline criteria for Anterolateral infarct are now Present See ED provider note for full interpretation and clinical correlation Confirmed by Wanda Juan ([id number]) on 2/1/2024 10:32:50 AM    CT chest wo IV contrast    Result Date: 2/1/2024  Interpreted By:  Beulah Chacon, STUDY: CT CHEST WO IV CONTRAST;  2/1/2024 6:40 am   INDICATION: Signs/Symptoms:sob   COMPARISON: Chest x-ray 02/01/2024. CT chest 05/04/2021   ACCESSION NUMBER(S): [id number]   ORDERING CLINICIAN: BHARGAV SWEET   TECHNIQUE: Axial CT images were obtained through the chest with no intravenous contrast administration.  Coronal and sagittal reformats were performed.   FINDINGS: There is motion artifact.   HEART AND VESSELS: No thoracic aortic aneurysm. Atherosclerotic calcifications are noted at the thoracic aorta. The main pulmonary artery is dilated. The heart is enlarged.   There is a small pericardial effusion. Status post left atrial appendage occlusion device placement.   MEDIASTINUM AND TAMMIE, LOWER NECK AND AXILLA: There is a 1.1 cm peripherally  calcified nodule at the left lobe of the thyroid gland.   No obvious pathologically enlarged lymph nodes on unenhanced CT.   LUNGS AND AIRWAYS: The trachea and central airways are patent.   The evaluation of the lungs is degraded by motion artifact. There is a large right pleural effusion with complete collapse of the right lower lobe. Atelectasis/consolidation at right upper lobe and right middle lobe. Tree-in-bud airspace opacities at the right upper lobe and left lower lobe. There is mild atelectasis at the left lower lobe. No left pleural effusion. No pneumothorax.   UPPER ABDOMEN: Motion artifact. No obvious acute findings.   CHEST WALL AND OSSEOUS STRUCTURES: There is soft tissue edema at the chest wall and visualized abdominal wall. The evaluation for acute fracture is degraded by motion artifact. Multilevel degenerative changes of the spine.       1. Study degraded by motion artifact. Large right pleural effusion with complete collapse of the right lower lobe. Atelectasis/consolidation at the right upper lobe and right middle lobe. Follow-up CT after treatment recommended to ensure complete resolution and exclude underlying mass. 2. Tree-in-bud airspace opacities at the right upper lobe and left lower lobe, may be seen with acute infection/inflammation of the smaller airways such as bronchiolitis or bronchopneumonia. 3. Mild atelectasis at the left lower lobe. 4. Cardiomegaly. Small pericardial effusion. Dilated main pulmonary artery, please correlate for pulmonary arterial hypertension. 5. 1.1 cm peripherally calcified nodule at the left lobe of the thyroid gland. This can be further characterized with nonemergent thyroid ultrasound. 6. Soft tissue edema at the chest wall and visualized abdominal wall.         MACRO:   Critical Finding:  See findings. Notification was initiated on 2/1/2024 at 6:50 am by  Beulah Chacon.  (**-YCF-**) Instructions:   Signed by: Beulah Chacon 2/1/2024 6:56 AM Dictation  workstation:   [id number]    XR chest 1 view    Result Date: 2/1/2024  Interpreted By:  Lesley Dooley, STUDY: XR CHEST 1 VIEW;  2/1/2024 3:03 am   INDICATION: Signs/Symptoms:sob.   COMPARISON: 07/19/2023   ACCESSION NUMBER(S): [id number]   ORDERING CLINICIAN: BHARGAV SWEET   FINDINGS:     CARDIOMEDIASTINAL SILHOUETTE: Stable cardiomegaly. Vague density overlying the upper cardiac silhouette may represent a left atrial appendage closure device.   LUNGS: Opacity involving the mid to lower right thorax, likely combination of large right pleural effusion and atelectasis. No pneumothorax.   ABDOMEN: No remarkable upper abdominal findings.   BONES: No acute osseous abnormality.       New large right pleural effusion and basilar atelectasis. Underlying pneumonia is not excluded in the appropriate clinical setting.   MACRO: None   Signed by: Lesley Dooley 2/1/2024 3:19 AM Dictation workstation:   [id number]      Assessment/Plan        Principal Problem:    Heart failure (CMS/HCC)      Daphne Morris is a 68 y.o. female presenting with past medical history of heart failure, atrial fibrillation, Hypertension, type 2 DM transfer from [facility] due to acute hypoxic respiratory failure secondary to pneumonia, COPD exacerbation and acute diastolic heart failure.     #Acute hypoxic respiratory failure secondary to acute on chronic diastolic heart failure and influenza B pneumonia  -Seen and examined  -Hemodynamically stable satting well on HFNC  -Labs showed hemoglobin of 6.4>>7.4, serum creatinine of 3.2>> 3.5  -Echocardiogram 2/05/24/revealed LVEF of 60-65%, impaired relaxation, mod dilated atria  -Right heart cath February 7, 2024 revealed moderate pulmonary hypertension, mPAP of 30 to 33 mmHg, mRA 9 mmHg, normal cardiac output, moderately elevated PCWP  -BNP 1444  --Chest x-ray with stable pleural and parenchymal opacities and new left pleural effusion with left basilar atelectasis or edema  Continue BiPAP 22/5m wean  O2 as tolerated /  on O2 at 2l/min at baseline  We will start Lasix drip and cardiac diet with 1.5 L fluid restriction  Continue renally dosed Tamiflu  Strict I's and O's and cardiac diet  -Pulmonary on board, patient also on prednisone, Tamiflu, If no improvement ro consider thoracentesis  -Cardiology on board, unable to do left heart cath due to elevated creatinine, unable to start SGLT2 inhibitors as well  -Nephrology on board and recommended to continue diuresis with the Lasix drip    #Stage III/IV chronic kidney disease  Status post right nephrectomy  Renal ultrasound negative  Renal function seems to be near baseline  Nephrology on board plan to continue with Lasix nephrology team discussing dialysis with the patient and her family    #Anemia  -Status post 1 unit of blood   -FOBT positive  -A iron saturation of 11 iron level of 24 ferritin in 200  -Bleeding precautions  -Will consider iron supplements on discharge  -Will consider GI consult    #D-Dimer 13,011  Possibly related to flu  Ultrasound ruled out DVT  VQ scan low probability of PE     #Lower extremity cellulitis  To diuresis, leg elevation  Continue vancomycin     #Elevated troponin  Suspect demand ischemia  EKG with no significant changes from prior EKG  Continue aspirin, Plavix, statin, metoprolol  Cardiology following         COPD  Does not appear to be in acute exacerbation  Continue DuoNebs       Paroxysmal atrial fibrillation  Status post watchman in August 2023  Currently in sinus rhythm  Continue amiodarone and metoprolol     Type 2 diabetes mellitus  Continue insulin sliding scale  Holding Lantus due to limited oral intake  Holding Januvia  Continue diabetic diet  Follow Accu-Cheks     Hypertension  Cardiac diet  Neurovascular metoprolol continued  Monitor vitals     GERD  Continue PPI     Hyperlipidemia  Continue atorvastatin     Depression  Continue venlafaxine     Hypothyroidism  Continue Synthroid     DVT prophylaxis  Heparin                     Myke Newsome MD

## 2024-02-22 NOTE — CARE PLAN
The patient's goals for the shift include      The clinical goals for the shift include pts o2 will remain above 92% this shift    Over the shift, the patient did not make progress toward the following goals. Barriers to progression include the patient. Recommendations to address these barriers include educate patient on respiratory regime .

## 2024-02-23 LAB
ALBUMIN SERPL BCP-MCNC: 2.8 G/DL (ref 3.4–5)
ANION GAP SERPL CALC-SCNC: 17 MMOL/L (ref 10–20)
BASOPHILS # BLD AUTO: 0.01 X10*3/UL (ref 0–0.1)
BASOPHILS NFR BLD AUTO: 0.2 %
BUN SERPL-MCNC: 82 MG/DL (ref 6–23)
CALCIUM SERPL-MCNC: 7.8 MG/DL (ref 8.6–10.3)
CHLORIDE SERPL-SCNC: 94 MMOL/L (ref 98–107)
CO2 SERPL-SCNC: 27 MMOL/L (ref 21–32)
CREAT SERPL-MCNC: 3.93 MG/DL (ref 0.5–1.05)
EGFRCR SERPLBLD CKD-EPI 2021: 12 ML/MIN/1.73M*2
EOSINOPHIL # BLD AUTO: 0 X10*3/UL (ref 0–0.7)
EOSINOPHIL NFR BLD AUTO: 0 %
ERYTHROCYTE [DISTWIDTH] IN BLOOD BY AUTOMATED COUNT: 13.8 % (ref 11.5–14.5)
GLUCOSE BLD MANUAL STRIP-MCNC: 167 MG/DL (ref 74–99)
GLUCOSE BLD MANUAL STRIP-MCNC: 208 MG/DL (ref 74–99)
GLUCOSE BLD MANUAL STRIP-MCNC: 221 MG/DL (ref 74–99)
GLUCOSE BLD MANUAL STRIP-MCNC: 240 MG/DL (ref 74–99)
GLUCOSE SERPL-MCNC: 217 MG/DL (ref 74–99)
HCT VFR BLD AUTO: 26.2 % (ref 36–46)
HGB BLD-MCNC: 8 G/DL (ref 12–16)
HOLD SPECIMEN: NORMAL
HYPOCHROMIA BLD QL SMEAR: NORMAL
IMM GRANULOCYTES # BLD AUTO: 0.04 X10*3/UL (ref 0–0.7)
IMM GRANULOCYTES NFR BLD AUTO: 0.7 % (ref 0–0.9)
LYMPHOCYTES # BLD AUTO: 0.92 X10*3/UL (ref 1.2–4.8)
LYMPHOCYTES NFR BLD AUTO: 15.5 %
MAGNESIUM SERPL-MCNC: 2 MG/DL (ref 1.6–2.4)
MCH RBC QN AUTO: 29.5 PG (ref 26–34)
MCHC RBC AUTO-ENTMCNC: 30.5 G/DL (ref 32–36)
MCV RBC AUTO: 97 FL (ref 80–100)
MONOCYTES # BLD AUTO: 0.5 X10*3/UL (ref 0.1–1)
MONOCYTES NFR BLD AUTO: 8.4 %
NEUTROPHILS # BLD AUTO: 4.48 X10*3/UL (ref 1.2–7.7)
NEUTROPHILS NFR BLD AUTO: 75.2 %
NRBC BLD-RTO: 0 /100 WBCS (ref 0–0)
PHOSPHATE SERPL-MCNC: 5.5 MG/DL (ref 2.5–4.9)
PLATELET # BLD AUTO: 314 X10*3/UL (ref 150–450)
POTASSIUM SERPL-SCNC: 4.9 MMOL/L (ref 3.5–5.3)
RBC # BLD AUTO: 2.71 X10*6/UL (ref 4–5.2)
RBC MORPH BLD: NORMAL
SODIUM SERPL-SCNC: 133 MMOL/L (ref 136–145)
VANCOMYCIN SERPL-MCNC: 20.4 UG/ML (ref 5–20)
WBC # BLD AUTO: 6 X10*3/UL (ref 4.4–11.3)

## 2024-02-23 PROCEDURE — 85025 COMPLETE CBC W/AUTO DIFF WBC: CPT | Performed by: INTERNAL MEDICINE

## 2024-02-23 PROCEDURE — 2500000002 HC RX 250 W HCPCS SELF ADMINISTERED DRUGS (ALT 637 FOR MEDICARE OP, ALT 636 FOR OP/ED)

## 2024-02-23 PROCEDURE — 99232 SBSQ HOSP IP/OBS MODERATE 35: CPT | Performed by: NURSE PRACTITIONER

## 2024-02-23 PROCEDURE — 80202 ASSAY OF VANCOMYCIN: CPT | Performed by: INTERNAL MEDICINE

## 2024-02-23 PROCEDURE — 1200000002 HC GENERAL ROOM WITH TELEMETRY DAILY

## 2024-02-23 PROCEDURE — 99233 SBSQ HOSP IP/OBS HIGH 50: CPT | Performed by: INTERNAL MEDICINE

## 2024-02-23 PROCEDURE — 2500000002 HC RX 250 W HCPCS SELF ADMINISTERED DRUGS (ALT 637 FOR MEDICARE OP, ALT 636 FOR OP/ED): Performed by: FAMILY MEDICINE

## 2024-02-23 PROCEDURE — 87340 HEPATITIS B SURFACE AG IA: CPT | Mod: GEALAB | Performed by: INTERNAL MEDICINE

## 2024-02-23 PROCEDURE — 80069 RENAL FUNCTION PANEL: CPT | Performed by: INTERNAL MEDICINE

## 2024-02-23 PROCEDURE — 82947 ASSAY GLUCOSE BLOOD QUANT: CPT

## 2024-02-23 PROCEDURE — 99233 SBSQ HOSP IP/OBS HIGH 50: CPT

## 2024-02-23 PROCEDURE — 86706 HEP B SURFACE ANTIBODY: CPT | Mod: GEALAB | Performed by: INTERNAL MEDICINE

## 2024-02-23 PROCEDURE — 2500000001 HC RX 250 WO HCPCS SELF ADMINISTERED DRUGS (ALT 637 FOR MEDICARE OP)

## 2024-02-23 PROCEDURE — 94640 AIRWAY INHALATION TREATMENT: CPT

## 2024-02-23 PROCEDURE — 83735 ASSAY OF MAGNESIUM: CPT | Performed by: INTERNAL MEDICINE

## 2024-02-23 PROCEDURE — 2500000004 HC RX 250 GENERAL PHARMACY W/ HCPCS (ALT 636 FOR OP/ED)

## 2024-02-23 RX ORDER — VANCOMYCIN HYDROCHLORIDE 1 G/20ML
INJECTION, POWDER, LYOPHILIZED, FOR SOLUTION INTRAVENOUS DAILY PRN
Status: DISCONTINUED | OUTPATIENT
Start: 2024-02-23 | End: 2024-02-28

## 2024-02-23 RX ADMIN — OSELTAMAVIR PHOSPHATE 30 MG: 30 CAPSULE ORAL at 09:57

## 2024-02-23 RX ADMIN — PANTOPRAZOLE SODIUM 40 MG: 40 TABLET, DELAYED RELEASE ORAL at 09:57

## 2024-02-23 RX ADMIN — INSULIN LISPRO 3 UNITS: 100 INJECTION, SOLUTION INTRAVENOUS; SUBCUTANEOUS at 18:21

## 2024-02-23 RX ADMIN — METOPROLOL SUCCINATE 50 MG: 50 TABLET, EXTENDED RELEASE ORAL at 09:57

## 2024-02-23 RX ADMIN — INSULIN LISPRO 6 UNITS: 100 INJECTION, SOLUTION INTRAVENOUS; SUBCUTANEOUS at 11:52

## 2024-02-23 RX ADMIN — IPRATROPIUM BROMIDE AND ALBUTEROL SULFATE 3 ML: 2.5; .5 SOLUTION RESPIRATORY (INHALATION) at 19:53

## 2024-02-23 RX ADMIN — AMLODIPINE BESYLATE 5 MG: 5 TABLET ORAL at 09:57

## 2024-02-23 RX ADMIN — VENLAFAXINE HYDROCHLORIDE 75 MG: 75 CAPSULE, EXTENDED RELEASE ORAL at 09:57

## 2024-02-23 RX ADMIN — PREDNISONE 40 MG: 20 TABLET ORAL at 09:57

## 2024-02-23 RX ADMIN — Medication 5 MG: at 20:26

## 2024-02-23 RX ADMIN — FERROUS GLUCONATE 324 MG: 324 TABLET ORAL at 09:24

## 2024-02-23 RX ADMIN — CLOPIDOGREL 75 MG: 75 TABLET ORAL at 09:57

## 2024-02-23 RX ADMIN — INSULIN LISPRO 6 UNITS: 100 INJECTION, SOLUTION INTRAVENOUS; SUBCUTANEOUS at 09:25

## 2024-02-23 RX ADMIN — AMIODARONE HYDROCHLORIDE 200 MG: 200 TABLET ORAL at 09:24

## 2024-02-23 RX ADMIN — IPRATROPIUM BROMIDE AND ALBUTEROL SULFATE 3 ML: 2.5; .5 SOLUTION RESPIRATORY (INHALATION) at 13:54

## 2024-02-23 RX ADMIN — LEVOTHYROXINE SODIUM 200 MCG: 100 TABLET ORAL at 05:21

## 2024-02-23 RX ADMIN — PANTOPRAZOLE SODIUM 40 MG: 40 TABLET, DELAYED RELEASE ORAL at 20:26

## 2024-02-23 RX ADMIN — POLYETHYLENE GLYCOL 3350 17 G: 17 POWDER, FOR SOLUTION ORAL at 09:57

## 2024-02-23 RX ADMIN — ATORVASTATIN CALCIUM 40 MG: 40 TABLET, FILM COATED ORAL at 09:57

## 2024-02-23 RX ADMIN — METOPROLOL SUCCINATE 50 MG: 50 TABLET, EXTENDED RELEASE ORAL at 20:26

## 2024-02-23 RX ADMIN — ASPIRIN 81 MG: 81 TABLET, COATED ORAL at 09:57

## 2024-02-23 ASSESSMENT — COGNITIVE AND FUNCTIONAL STATUS - GENERAL
DRESSING REGULAR LOWER BODY CLOTHING: A LOT
MOVING FROM LYING ON BACK TO SITTING ON SIDE OF FLAT BED WITH BEDRAILS: A LOT
HELP NEEDED FOR BATHING: A LOT
DRESSING REGULAR LOWER BODY CLOTHING: A LOT
CLIMB 3 TO 5 STEPS WITH RAILING: A LOT
EATING MEALS: A LITTLE
PERSONAL GROOMING: A LOT
DAILY ACTIVITIY SCORE: 13
WALKING IN HOSPITAL ROOM: A LOT
WALKING IN HOSPITAL ROOM: A LOT
TOILETING: A LOT
MOVING TO AND FROM BED TO CHAIR: A LOT
DRESSING REGULAR UPPER BODY CLOTHING: A LOT
MOBILITY SCORE: 12
STANDING UP FROM CHAIR USING ARMS: A LOT
MOBILITY SCORE: 12
DAILY ACTIVITIY SCORE: 13
STANDING UP FROM CHAIR USING ARMS: A LOT
MOVING TO AND FROM BED TO CHAIR: A LOT
CLIMB 3 TO 5 STEPS WITH RAILING: A LOT
TOILETING: A LOT
TURNING FROM BACK TO SIDE WHILE IN FLAT BAD: A LOT
EATING MEALS: A LITTLE
DRESSING REGULAR UPPER BODY CLOTHING: A LOT
MOVING FROM LYING ON BACK TO SITTING ON SIDE OF FLAT BED WITH BEDRAILS: A LOT
HELP NEEDED FOR BATHING: A LOT
PERSONAL GROOMING: A LOT
TURNING FROM BACK TO SIDE WHILE IN FLAT BAD: A LOT

## 2024-02-23 ASSESSMENT — PAIN SCALES - GENERAL
PAINLEVEL_OUTOF10: 0 - NO PAIN
PAINLEVEL_OUTOF10: 0 - NO PAIN

## 2024-02-23 ASSESSMENT — PAIN - FUNCTIONAL ASSESSMENT
PAIN_FUNCTIONAL_ASSESSMENT: 0-10
PAIN_FUNCTIONAL_ASSESSMENT: 0-10

## 2024-02-23 NOTE — CARE PLAN
The patient's goals for the shift include  to be weaned down to 2L O2 this shift.     The clinical goals for the shift include remian hds

## 2024-02-23 NOTE — PROGRESS NOTES
"Physical Therapy                 Therapy Communication Note    Patient Name: Daphne Morris  MRN: [id number]  Today's Date: 2/23/2024     Discipline: Physical Therapy    Missed Visit Reason: Missed Visit Reason: Patient refused (\"No not now, people have been in here all day, I want to rest. I asked to sit edge of bed last night and they said no\" PTA updated whiteboard and nursing with up to EOB with assist. no Tx, RN aware)    Missed Time: Attempt  1214    Comment:  "

## 2024-02-23 NOTE — PROGRESS NOTES
"Daphne Morris is a 68 y.o. female on day 4 of admission presenting with Heart failure (CMS/HCC).      Subjective   She is sitting up in the bed, more awake, states shortness of breath is improved but still labored with conversation. O2 at 4L Per NC. Still with significant BLE edema. Arm swelling improved.       Review of systems:  Constitutional: negative for fever, chills, or malaise  Neuro: negative for dizziness, headache, numbness, tingling  ENT: Negative for nasal congestion or sore throat  CV: negative for chest pain, palpitations  GI: negative for abd pain, nausea, vomiting or diarrhea  : negative for dysuria, frequency, or urgency  Skin: negative for lesions, wounds, or rash  Musculoskeletal: Negative for myalgia, or arthralgia  Endocrine: Negative for polyuria or polydipsia         Objective   Constitutional: Well developed, alert and oriented x3, no distress, cooperative  Eyes: PERRL, EOMI, clear sclera  ENMT: mucous membranes moist, no apparent injury, no lesions seen  Head/Neck: Neck supple, no apparent injury, thyroid without mass or tenderness, No JVD, trachea midline, no bruits  Respiratory/Thorax: Patent airways, diminished throughout with good chest expansion, thorax symmetric  Cardiovascular: Regular, rate and rhythm, no murmurs, 2+ equal pulses of the extremities, normal S 1and S 2  Gastrointestinal: Nondistended, soft, non-tender, no rebound tenderness or guarding, no masses palpable, no organomegaly, +BS, no bruits  Musculoskeletal: ROM intact, no joint swelling, normal strength  Extremities: generalized edema  Neurological: Alert and oriented x3  Lymphatic: No significant lymphadenopathy  Skin: Warm and dry, no lesions, no rashes      Last Recorded Vitals  /71 (BP Location: Right arm, Patient Position: Lying)   Pulse 60   Temp 36.8 °C (98.2 °F) (Temporal)   Resp 21   Ht 1.676 m (5' 6\")   Wt 98.5 kg (217 lb 2.5 oz)   SpO2 92%   BMI 35.05 kg/m²     Intake/Output last 3 " Shifts:  I/O last 3 completed shifts:  In: 3649.7 (37.1 mL/kg) [P.O.:540; I.V.:24.3 (0.2 mL/kg); Blood:3085.4]  Out: 310 (3.1 mL/kg) [Urine:310 (0.1 mL/kg/hr)]  Weight: 98.5 kg   I/O this shift:  In: -   Out: 350 [Urine:350]    Relevant Results  Scheduled medications  amiodarone, 200 mg, oral, Daily with breakfast  amLODIPine, 5 mg, oral, Daily  aspirin, 81 mg, oral, Daily  atorvastatin, 40 mg, oral, Daily  clopidogrel, 75 mg, oral, Daily  ferrous gluconate, 324 mg, oral, Daily with breakfast  [Held by provider] furosemide, 80 mg, oral, BID  [Held by provider] heparin (porcine), 5,000 Units, subcutaneous, q8h  [Held by provider] insulin glargine, 14 Units, subcutaneous, Nightly  insulin lispro, 0-15 Units, subcutaneous, TID with meals  ipratropium-albuteroL, 3 mL, nebulization, TID  levothyroxine, 200 mcg, oral, Daily before breakfast  lidocaine, 5 mL, infiltration, Once  melatonin, 5 mg, oral, Daily  metoprolol succinate XL, 50 mg, oral, BID  oseltamivir, 30 mg, oral, Daily  pantoprazole, 40 mg, oral, BID  polyethylene glycol, 17 g, oral, Daily  potassium chloride CR, 20 mEq, oral, Daily  potassium chloride CR, 40 mEq, oral, Daily  predniSONE, 40 mg, oral, Daily  venlafaxine XR, 75 mg, oral, Daily      Continuous medications  [Held by provider] furosemide, 10 mg/hr, Last Rate: 10 mg/hr (02/22/24 1927)      PRN medications  PRN medications: acetaminophen **OR** acetaminophen **OR** acetaminophen, acetaminophen **OR** acetaminophen **OR** acetaminophen, albuterol, dextrose 10 % in water (D10W), dextrose, diphenhydrAMINE, glucagon, ipratropium-albuteroL, ondansetron **OR** ondansetron, oxygen, oxygen, vancomycin    Results for orders placed or performed during the hospital encounter of 02/19/24 (from the past 24 hour(s))   POCT GLUCOSE   Result Value Ref Range    POCT Glucose 216 (H) 74 - 99 mg/dL   POCT GLUCOSE   Result Value Ref Range    POCT Glucose 211 (H) 74 - 99 mg/dL   CBC and Auto Differential   Result Value  Ref Range    WBC 6.0 4.4 - 11.3 x10*3/uL    nRBC 0.0 0.0 - 0.0 /100 WBCs    RBC 2.71 (L) 4.00 - 5.20 x10*6/uL    Hemoglobin 8.0 (L) 12.0 - 16.0 g/dL    Hematocrit 26.2 (L) 36.0 - 46.0 %    MCV 97 80 - 100 fL    MCH 29.5 26.0 - 34.0 pg    MCHC 30.5 (L) 32.0 - 36.0 g/dL    RDW 13.8 11.5 - 14.5 %    Platelets 314 150 - 450 x10*3/uL    Neutrophils % 75.2 40.0 - 80.0 %    Immature Granulocytes %, Automated 0.7 0.0 - 0.9 %    Lymphocytes % 15.5 13.0 - 44.0 %    Monocytes % 8.4 2.0 - 10.0 %    Eosinophils % 0.0 0.0 - 6.0 %    Basophils % 0.2 0.0 - 2.0 %    Neutrophils Absolute 4.48 1.20 - 7.70 x10*3/uL    Immature Granulocytes Absolute, Automated 0.04 0.00 - 0.70 x10*3/uL    Lymphocytes Absolute 0.92 (L) 1.20 - 4.80 x10*3/uL    Monocytes Absolute 0.50 0.10 - 1.00 x10*3/uL    Eosinophils Absolute 0.00 0.00 - 0.70 x10*3/uL    Basophils Absolute 0.01 0.00 - 0.10 x10*3/uL   Renal Function Panel   Result Value Ref Range    Glucose 217 (H) 74 - 99 mg/dL    Sodium 133 (L) 136 - 145 mmol/L    Potassium 4.9 3.5 - 5.3 mmol/L    Chloride 94 (L) 98 - 107 mmol/L    Bicarbonate 27 21 - 32 mmol/L    Anion Gap 17 10 - 20 mmol/L    Urea Nitrogen 82 (H) 6 - 23 mg/dL    Creatinine 3.93 (H) 0.50 - 1.05 mg/dL    eGFR 12 (L) >60 mL/min/1.73m*2    Calcium 7.8 (L) 8.6 - 10.3 mg/dL    Phosphorus 5.5 (H) 2.5 - 4.9 mg/dL    Albumin 2.8 (L) 3.4 - 5.0 g/dL   Magnesium   Result Value Ref Range    Magnesium 2.00 1.60 - 2.40 mg/dL   Vancomycin   Result Value Ref Range    Vancomycin 20.4 (H) 5.0 - 20.0 ug/mL   Morphology   Result Value Ref Range    RBC Morphology See Below     Hypochromia Mild    SST TOP   Result Value Ref Range    Extra Tube Hold for add-ons.    POCT GLUCOSE   Result Value Ref Range    POCT Glucose 221 (H) 74 - 99 mg/dL   POCT GLUCOSE   Result Value Ref Range    POCT Glucose 208 (H) 74 - 99 mg/dL       XR chest 1 view   Final Result   1. Large right pleural effusion similar to recent prior studies.   2. Pulmonary venous hypertension.    3. Cardiomegaly.   4. Probable left lower lobe infiltrate or atelectasis.                  MACRO:   None        Signed by: Mario Sampson 2/21/2024 11:57 AM   Dictation workstation:   [id number]      NM Lung perfusion with spect/ct   Final Result   There are no segmental or subsegmental perfusion defects in both   lungs indicating low probability for acute pulmonary embolism.   Moderate-sized right pleural effusion.             The interpretation above is based on modified PISAPED criteria.        This study was analyzed and interpreted at [place], [place].        Signed by: Rolando Dillon 2/19/2024 10:38 AM   Dictation workstation:   [id number]      Consult to Interventional Radiology    (Results Pending)   Bedside Midline Imaging    (Results Pending)       Transthoracic Echo (TTE) Complete    Result Date: 2/5/2024   [facility], [address] [place] [address]               Tel [phone] and Fax [phone] TRANSTHORACIC ECHOCARDIOGRAM REPORT  Patient Name:      MOE SHANNA MAYA      Reading Physician:    [id number] Nicholas Antonio MD Study Date:        2/5/2024             Ordering Provider:    [id number] DOMONIQUE PURCELL MRN/PID:           [id number]             Fellow: Accession#:        [id number]         Nurse: Date of Birth/Age: 1955 / 68 years Sonographer:          Nina Angel RDCS Gender:            F                    Additional Staff: Height:            167.64 cm            Admit Date:           2/2/2024 Weight:            102.51 kg            Admission Status:     Inpatient -                                                               Routine BSA:               2.11 m2              Encounter#:           [id number]                                          Department Location:  [facility] Non                                                               Invasive Blood Pressure: 147 /65 mmHg Study Type:    TRANSTHORACIC ECHO (TTE) COMPLETE Diagnosis/ICD: Acute combined systolic (congestive) and diastolic (congestive)                heart failure (CHF)-I50.41 Indication:    Congestive Heart Failure CPT Code:      Echo Complete w Full Doppler-22316 Patient History: Diabetes:         Yes Pertinent         A-Fib, HTN, Dyspnea and LE Edema. Watchman device, elevated History:          BNP,. Study Detail: The following Echo studies were performed: 2D, M-Mode, Doppler and               color flow.  PHYSICIAN INTERPRETATION: Left Ventricle: The left ventricular systolic function is normal, with an estimated ejection fraction of 60-65%. There are no regional wall motion abnormalities. The left ventricular cavity size is normal. Spectral Doppler shows an impaired relaxation pattern of left ventricular diastolic filling. Left Atrium: The left atrium is moderately dilated. Right Ventricle: The right ventricle is mildly enlarged. There is mildly reduced right ventricular systolic function. Right Atrium: The right atrium is moderately dilated. Aortic Valve: The aortic valve is trileaflet. There is mild aortic valve cusp calcification. There is trivial aortic valve regurgitation. The peak instantaneous gradient of the aortic valve is 14.1 mmHg. The mean gradient of the aortic valve is 9.1 mmHg. Mitral Valve: The mitral valve is normal in structure. There is mild mitral annular calcification. There is mild to moderate mitral valve regurgitation. Tricuspid Valve: The tricuspid valve is structurally normal. There is moderate tricuspid regurgitation. Pulmonic Valve: The pulmonic valve is structurally normal. There is mild pulmonic valve regurgitation. Pericardium: There is a small to moderate pericardial effusion posterior to the left ventricle. There is no evidence of cardiac tamponade.  Aorta: The aortic root is normal. Pulmonary Artery: The tricuspid regurgitant velocity is 3.04 m/s, and with an estimated right atrial pressure of 3 mmHg, the estimated pulmonary artery pressure is mildly elevated with the RVSP at 39.9 mmHg. Pulmonary Veins: The pulmonary veins appear normal and return normally to the left atrium. Systemic Veins: The inferior vena cava appears to be of normal size. There is IVC inspiratory collapse greater than 50%.  CONCLUSIONS:  1. Left ventricular systolic function is normal with a 60-65% estimated ejection fraction.  2. Spectral Doppler shows an impaired relaxation pattern of left ventricular diastolic filling.  3. There is mildly reduced right ventricular systolic function.  4. The left atrium is moderately dilated.  5. The right atrium is moderately dilated.  6. There is a small to moderate pericardial effusion.  7. There is no evidence of cardiac tamponade.  8. Mild to moderate mitral valve regurgitation.  9. Moderate tricuspid regurgitation visualized. 10. Normal CVP. Estimated PASP around 45 mm Hg. QUANTITATIVE DATA SUMMARY: 2D MEASUREMENTS:                           Normal Ranges: IVSd:          1.52 cm    (0.6-1.1cm) LVPWd:         1.42 cm    (0.6-1.1cm) LVIDd:         4.35 cm    (3.9-5.9cm) LVIDs:         2.94 cm LV Mass Index: 120.7 g/m2 LV % FS        32.3 % LA VOLUME:                              Normal Ranges: LA Vol A4C:       79.5 ml    (22+/-6mL/m2) LA Vol A2C:       78.2 ml LA Vol BP:        79.7 ml LA Vol Index A4C: 37.7ml/m2 LA Vol Index A2C: 37.1 ml/m2 LA Vol Index BP:  37.9 ml/m2 LA Volume Index:  37.8 ml/m2 LA Vol A4C:       76.0 ml LA Vol A2C:       73.9 ml RA VOLUME BY A/L METHOD:                       Normal Ranges: RA Area A4C: 17.8 cm2 M-MODE MEASUREMENTS:                  Normal Ranges: Ao Root: 3.00 cm (2.0-3.7cm) LAs:     5.08 cm (2.7-4.0cm) LV SYSTOLIC FUNCTION BY 2D PLANIMETRY (MOD):                     Normal Ranges: EF-A4C View: 63.4 % (>=55%)  EF-A2C View: 63.8 % EF-Biplane:  62.5 % LV DIASTOLIC FUNCTION:                             Normal Ranges: MV Peak E:      1.33 m/s    (0.7-1.2 m/s) MV Peak A:      0.53 m/s    (0.42-0.7 m/s) E/A Ratio:      2.52        (1.0-2.2) MV e'           0.06 m/s    (>8.0) MV lateral e'   0.06 m/s MV medial e'    0.06 m/s MV A Dur:       110.73 msec E/e' Ratio:     22.09       (<8.0) PulmV Sys Mike:  53.97 cm/s PulmV Dugan Mike: 77.63 cm/s PulmV S/D Mike:  0.70 MITRAL VALVE:                 Normal Ranges: MV DT: 245 msec (150-240msec) MITRAL INSUFFICIENCY:                         Normal Ranges: MR VTI:     171.38 cm MR Vmax:    489.73 cm/s MR Volume:  20.07 ml MR Flow Rt: 57.36 ml/s MR EROA:    0.12 cm2 AORTIC VALVE:                                    Normal Ranges: AoV Vmax:                1.88 m/s  (<=1.7m/s) AoV Peak P.1 mmHg (<20mmHg) AoV Mean P.1 mmHg  (1.7-11.5mmHg) LVOT Max Mike:            0.97 m/s  (<=1.1m/s) AoV VTI:                 46.22 cm  (18-25cm) LVOT VTI:                24.89 cm LVOT Diameter:           1.96 cm   (1.8-2.4cm) AoV Area, VTI:           1.62 cm2  (2.5-5.5cm2) AoV Area,Vmax:           1.56 cm2  (2.5-4.5cm2) AoV Dimensionless Index: 0.54  RIGHT VENTRICLE: RV Basal 3.80 cm RV Mid   2.80 cm RV Major 8.0 cm TAPSE:   18.0 mm RV s'    0.13 m/s TRICUSPID VALVE/RVSP:                             Normal Ranges: Peak TR Velocity: 3.04 m/s RV Syst Pressure: 39.9 mmHg (< 30mmHg) PULMONIC VALVE:                      Normal Ranges: PV Max Mike: 1.0 m/s  (0.6-0.9m/s) PV Max PG:  3.9 mmHg Pulmonary Veins: PulmV Dugan Mike: 77.63 cm/s PulmV S/D Mike:  0.70 PulmV Sys Mike:  53.97 cm/s  [id number] Nicholas Antonio MD Electronically signed on 2024 at 5:06:05 PM  ** Final **           Assessment/Plan   Principal Problem:    Heart failure (CMS/HCC)    1. Acute on chronic hypoxic/hypercapnic respiratory failure 2/2 acute on chronic diastolic CHF, influenza, COPD   -Isolation  -Tamiflu  -BNP  1444  -CXR showed CHF, Grossly stable pleural and parenchymal opacities in the mid and lower right hemithorax. Mild new left pleural effusion  -VQ scan showed moderate right pleural effusion  -bronchodilators  -Steroids  -oxygen/bipap support  -Significant leg to abd swelling and shortness of breath  -Strict I & Os  -Daily weights  -2gm na diet  -Echo as above  -Unable to start SGLT2 inhibitors with current GFR  -s/p RHC during last admit showed MEAN PA 30 MILD PULMONARY HTN MEAN RA 9 MM HG MEAN PCWP 23 MM HG CARDIAC OUTPUT 8.03 CI 3.79 NO SHUNTS      --Still appears volume overloaded   -Hold lasix gtt for now due to worsening creatinine  -Discussed the need for dialysis  -Monitor on tele     2. Elevated troponins-non MI elevation 2/2 influenza, respiratory distress  -Denies chest pain  -Does have shortness of breath-worsening for weeks  -Unable to have LHC due to creatinine  -Cont asa, plavix, statin, metoprolol  -Monitor on tele     3. Paroxysmal atrial fibrillation with RVR  -s/p Watchman in Aug 2023  -Currently SB/SR  -Cont amiodarone and metoprolol for rate control  -Monitor on tele     4. Hypertension  -stable  -2gm na diet  -cont meds  -monitor     5. Acute on chronic Anemia  -hgb down to 6.4, s/p PRBCs  -Hgb 7.5 today  -Monitor CBC  -Cont asa and plavix for now->hold if hgb conts to drop     6. Acute on Chronic kidney disease  -s/p nephrectomy  -Renal US negative  -Creatinine worsening and lower UOP with diuresis and still volume overloaded  -Hold lasix gtt for now due to worsening creatinine  -Renal consulted, note reviewed, possible dialysis depending on family and pt's wishes  -palliative care following     7. Diabetes  -ISS  -Accuchecks     8. Hypothyroidism  -Cont synthroid      Kacey Monique, APRN-CNP

## 2024-02-23 NOTE — PROGRESS NOTES
"Daphne Morris is a 68 y.o. female on day 4 of admission presenting with Heart failure (CMS/[facility]).    Subjective   No acute events overnight. Patient continues to report improvement of respiratory status.     Objective     Physical Exam  Vitals reviewed.   Constitutional:       General: She is not in acute distress.     Appearance: She is ill-appearing.   Cardiovascular:      Rate and Rhythm: Normal rate and regular rhythm.      Heart sounds: Normal heart sounds. No murmur heard.  Pulmonary:      Effort: Pulmonary effort is normal. No respiratory distress.      Breath sounds: No wheezing.   Abdominal:      General: There is no distension.      Palpations: Abdomen is soft.      Tenderness: There is no abdominal tenderness.   Musculoskeletal:         General: Swelling present. No tenderness.      Right lower leg: Edema present.      Left lower leg: Edema present.   Neurological:      General: No focal deficit present.      Mental Status: She is alert and oriented to person, place, and time. Mental status is at baseline.         Last Recorded Vitals  Blood pressure 138/71, pulse 60, temperature 36.8 °C (98.2 °F), temperature source Temporal, resp. rate 21, height 1.676 m (5' 6\"), weight 98.5 kg (217 lb 2.5 oz), SpO2 92 %.  Intake/Output last 3 Shifts:  I/O last 3 completed shifts:  In: 3649.7 (37.1 mL/kg) [P.O.:540; I.V.:24.3 (0.2 mL/kg); Blood:3085.4]  Out: 310 (3.1 mL/kg) [Urine:310 (0.1 mL/kg/hr)]  Weight: 98.5 kg     Relevant Results  Scheduled medications  amiodarone, 200 mg, oral, Daily with breakfast  amLODIPine, 5 mg, oral, Daily  aspirin, 81 mg, oral, Daily  atorvastatin, 40 mg, oral, Daily  clopidogrel, 75 mg, oral, Daily  ferrous gluconate, 324 mg, oral, Daily with breakfast  [Held by provider] furosemide, 80 mg, oral, BID  [Held by provider] heparin (porcine), 5,000 Units, subcutaneous, q8h  [Held by provider] insulin glargine, 14 Units, subcutaneous, Nightly  insulin lispro, 0-15 Units, subcutaneous, TID " with meals  ipratropium-albuteroL, 3 mL, nebulization, TID  levothyroxine, 200 mcg, oral, Daily before breakfast  lidocaine, 5 mL, infiltration, Once  melatonin, 5 mg, oral, Daily  metoprolol succinate XL, 50 mg, oral, BID  oseltamivir, 30 mg, oral, Daily  pantoprazole, 40 mg, oral, BID  polyethylene glycol, 17 g, oral, Daily  potassium chloride CR, 20 mEq, oral, Daily  potassium chloride CR, 40 mEq, oral, Daily  predniSONE, 40 mg, oral, Daily  venlafaxine XR, 75 mg, oral, Daily      Continuous medications  [Held by provider] furosemide, 10 mg/hr, Last Rate: 10 mg/hr (02/22/24 1927)      PRN medications  PRN medications: acetaminophen **OR** acetaminophen **OR** acetaminophen, acetaminophen **OR** acetaminophen **OR** acetaminophen, albuterol, dextrose 10 % in water (D10W), dextrose, diphenhydrAMINE, glucagon, ipratropium-albuteroL, ondansetron **OR** ondansetron, oxygen, oxygen, vancomycin    Results for orders placed or performed during the hospital encounter of 02/19/24 (from the past 24 hour(s))   POCT GLUCOSE   Result Value Ref Range    POCT Glucose 216 (H) 74 - 99 mg/dL   POCT GLUCOSE   Result Value Ref Range    POCT Glucose 211 (H) 74 - 99 mg/dL   CBC and Auto Differential   Result Value Ref Range    WBC 6.0 4.4 - 11.3 x10*3/uL    nRBC 0.0 0.0 - 0.0 /100 WBCs    RBC 2.71 (L) 4.00 - 5.20 x10*6/uL    Hemoglobin 8.0 (L) 12.0 - 16.0 g/dL    Hematocrit 26.2 (L) 36.0 - 46.0 %    MCV 97 80 - 100 fL    MCH 29.5 26.0 - 34.0 pg    MCHC 30.5 (L) 32.0 - 36.0 g/dL    RDW 13.8 11.5 - 14.5 %    Platelets 314 150 - 450 x10*3/uL    Neutrophils % 75.2 40.0 - 80.0 %    Immature Granulocytes %, Automated 0.7 0.0 - 0.9 %    Lymphocytes % 15.5 13.0 - 44.0 %    Monocytes % 8.4 2.0 - 10.0 %    Eosinophils % 0.0 0.0 - 6.0 %    Basophils % 0.2 0.0 - 2.0 %    Neutrophils Absolute 4.48 1.20 - 7.70 x10*3/uL    Immature Granulocytes Absolute, Automated 0.04 0.00 - 0.70 x10*3/uL    Lymphocytes Absolute 0.92 (L) 1.20 - 4.80 x10*3/uL     Monocytes Absolute 0.50 0.10 - 1.00 x10*3/uL    Eosinophils Absolute 0.00 0.00 - 0.70 x10*3/uL    Basophils Absolute 0.01 0.00 - 0.10 x10*3/uL   Renal Function Panel   Result Value Ref Range    Glucose 217 (H) 74 - 99 mg/dL    Sodium 133 (L) 136 - 145 mmol/L    Potassium 4.9 3.5 - 5.3 mmol/L    Chloride 94 (L) 98 - 107 mmol/L    Bicarbonate 27 21 - 32 mmol/L    Anion Gap 17 10 - 20 mmol/L    Urea Nitrogen 82 (H) 6 - 23 mg/dL    Creatinine 3.93 (H) 0.50 - 1.05 mg/dL    eGFR 12 (L) >60 mL/min/1.73m*2    Calcium 7.8 (L) 8.6 - 10.3 mg/dL    Phosphorus 5.5 (H) 2.5 - 4.9 mg/dL    Albumin 2.8 (L) 3.4 - 5.0 g/dL   Magnesium   Result Value Ref Range    Magnesium 2.00 1.60 - 2.40 mg/dL   Vancomycin   Result Value Ref Range    Vancomycin 20.4 (H) 5.0 - 20.0 ug/mL   Morphology   Result Value Ref Range    RBC Morphology See Below     Hypochromia Mild    SST TOP   Result Value Ref Range    Extra Tube Hold for add-ons.    POCT GLUCOSE   Result Value Ref Range    POCT Glucose 221 (H) 74 - 99 mg/dL   POCT GLUCOSE   Result Value Ref Range    POCT Glucose 208 (H) 74 - 99 mg/dL       Assessment/Plan   #Acute on chronic hypoxic hypercapnic respiratory failure 2/2 HFpEF exacerbation, COPD exacerbation  #Respiratory acidosis requiring BiPAP  #Influenza B positive  - Patient was able to be weaned down to 2L O2 this afternoon per RT, now back at baseline  - Lasix drip held  - Continue prednisone 40 mg daily  - Scheduled DuoNebs  - Today will be final day of Tamiflu, can stop since she's completed a 5 day course of it.   - Will get VBG tomorrow morning  - Hospice meeting planned for tomorrow    Hailey Sue MD

## 2024-02-23 NOTE — CARE PLAN
The patient's goals for the shift include      The clinical goals for the shift include remian hds    Over the shift, the patient did not make progress toward the following goals. Barriers to progression include patient remained hds. Recommendations to address these barriers include continue plan of care.

## 2024-02-23 NOTE — PROGRESS NOTES
02/23/24 1009   Discharge Planning   Living Arrangements Other (Comment)   Support Systems Friends/neighbors   Assistance Needed Patient arrived from [facility], patient was hospitalized here from 2/1-2/15, prior to that was home alone, A&0x3, ambulates with rollator baseline, doesn't drive, while home was on 3 L at HS only, has NPdoing home visits Poly Diaz   Type of Residence Skilled nursing facility   Do you have animals or pets at home? No   Who is requesting discharge planning? Provider   Home or Post Acute Services Post acute facilities (Rehab/SNF/etc)   Type of Post Acute Facility Services Skilled nursing   Patient expects to be discharged to: From [facility](would require precert for return-not initiated), Awaiting further discussions with family for further dc planning, palliative following.   Does the patient need discharge transport arranged? Yes   RoundTrip coordination needed? Yes   Has discharge transport been arranged? No

## 2024-02-23 NOTE — SIGNIFICANT EVENT
Called both contacts on file in efforts to get a time set for the family meeting they will join today via conference call.   Left a message requesting call back.   Planning to provide updates and revisit / determine GOC.

## 2024-02-23 NOTE — PROGRESS NOTES
Daphne Morris is a 68 y.o. female on day 4 of admission presenting with Heart failure (CMS/HCC).      Subjective   Seen and examined, no new complaints.  No overnight events.  The plan discussed with the patient and RN.       Objective     Last Recorded Vitals  /65 (BP Location: Right arm, Patient Position: Lying)   Pulse 58   Temp 35.6 °C (96.1 °F) (Temporal)   Resp 19   Wt 98.5 kg (217 lb 2.5 oz)   SpO2 93%   Intake/Output last 3 Shifts:    Intake/Output Summary (Last 24 hours) at 2/23/2024 1838  Last data filed at 2/23/2024 1041  Gross per 24 hour   Intake --   Output 350 ml   Net -350 ml         Admission Weight  Weight: 95.4 kg (210 lb 5.1 oz) (02/19/24 0438)    Daily Weight  02/22/24 : 98.5 kg (217 lb 2.5 oz)    Image Results  XR chest 1 view  Narrative: Interpreted By:  Mario Sampson,   STUDY:  XR CHEST 1 VIEW;  2/21/2024 11:37 am      INDICATION:  Signs/Symptoms:SOB.      COMPARISON:  02/18/2024 AP and 02/13/2024 AP and lateral chest x-rays and the  02/01/2024 unenhanced thoracic CT.      ACCESSION NUMBER(S):  Multilevel spinal degenerative changes are noted. [id number]      ORDERING CLINICIAN:  JIM ADORNO      FINDINGS:  Portable AP upright chest x-ray 02/21/2024:              CARDIOMEDIASTINAL SILHOUETTE:  The heart seems enlarged, but its size is likely to be exaggerated by  the AP lordotic projection. Vascular calcification and a left atrial  appendage Watchman device are noted. Mild coronary artery  calcification demonstrated on CT can not be appreciated.      LUNGS:  A large right pleural effusion demonstrated on the above studies  markedly limits evaluation of the right lung. New left retrocardiac  opacity suggests lower lobe infiltrate or atelectasis as the left  lateral costophrenic angle is sharp. Pulmonary vascular cephalization  suggests congestive cardiac failure, fluid overload or uremia. Refer  to the 02/01/2024 CT report for further details.      ABDOMEN:  No  remarkable upper abdominal findings.      BONES:  No acute osseous changes. Mild multilevel spinal degenerative changes  are noted.      Impression: 1. Large right pleural effusion similar to recent prior studies.  2. Pulmonary venous hypertension.  3. Cardiomegaly.  4. Probable left lower lobe infiltrate or atelectasis.              MACRO:  None      Signed by: Mario Sampson 2/21/2024 11:57 AM  Dictation workstation:   [id number]      Physical Exam  Constitutional: Well developed, awake/alert/oriented x3, no distress, alert and cooperative  Eyes: PERRL, EOMI, clear sclera  ENMT: mucous membranes moist, no apparent injury, no lesions seen  Head/Neck: Neck supple, no apparent injury, thyroid without mass or tenderness, No JVD, trachea midline, no bruits  Respiratory/Thorax: Patent airways, CTAB, normal breath sounds with good chest expansion, thorax symmetric  Cardiovascular: Regular, rate and rhythm, no murmurs, 2+ equal pulses of the extremities, normal S 1and S 2  Gastrointestinal: Nondistended, soft, non-tender, no rebound tenderness or guarding, no masses palpable, no organomegaly, +BS, no bruits  Musculoskeletal: ROM intact, no joint swelling, normal strength  Extremities: normal extremities, no cyanosis edema, contusions or wounds, no clubbing  Neurological: alert and oriented x3, intact senses, motor, response and reflexes, normal strength  Lymphatic: No significant lymphadenopathy  Psychological: Appropriate mood and behavior  Skin: Warm and dry, no lesions, no rashes  Relevant Results      Scheduled medications  amiodarone, 200 mg, oral, Daily with breakfast  amLODIPine, 5 mg, oral, Daily  aspirin, 81 mg, oral, Daily  atorvastatin, 40 mg, oral, Daily  clopidogrel, 75 mg, oral, Daily  ferrous gluconate, 324 mg, oral, Daily with breakfast  [Held by provider] furosemide, 80 mg, oral, BID  [Held by provider] heparin (porcine), 5,000 Units, subcutaneous, q8h  [Held by provider] insulin glargine, 14 Units,  subcutaneous, Nightly  insulin lispro, 0-15 Units, subcutaneous, TID with meals  ipratropium-albuteroL, 3 mL, nebulization, TID  levothyroxine, 200 mcg, oral, Daily before breakfast  lidocaine, 5 mL, infiltration, Once  melatonin, 5 mg, oral, Daily  metoprolol succinate XL, 50 mg, oral, BID  pantoprazole, 40 mg, oral, BID  polyethylene glycol, 17 g, oral, Daily  potassium chloride CR, 20 mEq, oral, Daily  potassium chloride CR, 40 mEq, oral, Daily  predniSONE, 40 mg, oral, Daily  venlafaxine XR, 75 mg, oral, Daily      Continuous medications  [Held by provider] furosemide, 10 mg/hr, Last Rate: 10 mg/hr (02/22/24 1927)      PRN medications  PRN medications: acetaminophen **OR** acetaminophen **OR** acetaminophen, acetaminophen **OR** acetaminophen **OR** acetaminophen, albuterol, dextrose 10 % in water (D10W), dextrose, diphenhydrAMINE, glucagon, ipratropium-albuteroL, ondansetron **OR** ondansetron, oxygen, oxygen, vancomycin          Results for orders placed or performed during the hospital encounter of 02/19/24 (from the past 24 hour(s))   POCT GLUCOSE   Result Value Ref Range    POCT Glucose 211 (H) 74 - 99 mg/dL   CBC and Auto Differential   Result Value Ref Range    WBC 6.0 4.4 - 11.3 x10*3/uL    nRBC 0.0 0.0 - 0.0 /100 WBCs    RBC 2.71 (L) 4.00 - 5.20 x10*6/uL    Hemoglobin 8.0 (L) 12.0 - 16.0 g/dL    Hematocrit 26.2 (L) 36.0 - 46.0 %    MCV 97 80 - 100 fL    MCH 29.5 26.0 - 34.0 pg    MCHC 30.5 (L) 32.0 - 36.0 g/dL    RDW 13.8 11.5 - 14.5 %    Platelets 314 150 - 450 x10*3/uL    Neutrophils % 75.2 40.0 - 80.0 %    Immature Granulocytes %, Automated 0.7 0.0 - 0.9 %    Lymphocytes % 15.5 13.0 - 44.0 %    Monocytes % 8.4 2.0 - 10.0 %    Eosinophils % 0.0 0.0 - 6.0 %    Basophils % 0.2 0.0 - 2.0 %    Neutrophils Absolute 4.48 1.20 - 7.70 x10*3/uL    Immature Granulocytes Absolute, Automated 0.04 0.00 - 0.70 x10*3/uL    Lymphocytes Absolute 0.92 (L) 1.20 - 4.80 x10*3/uL    Monocytes Absolute 0.50 0.10 - 1.00  x10*3/uL    Eosinophils Absolute 0.00 0.00 - 0.70 x10*3/uL    Basophils Absolute 0.01 0.00 - 0.10 x10*3/uL   Renal Function Panel   Result Value Ref Range    Glucose 217 (H) 74 - 99 mg/dL    Sodium 133 (L) 136 - 145 mmol/L    Potassium 4.9 3.5 - 5.3 mmol/L    Chloride 94 (L) 98 - 107 mmol/L    Bicarbonate 27 21 - 32 mmol/L    Anion Gap 17 10 - 20 mmol/L    Urea Nitrogen 82 (H) 6 - 23 mg/dL    Creatinine 3.93 (H) 0.50 - 1.05 mg/dL    eGFR 12 (L) >60 mL/min/1.73m*2    Calcium 7.8 (L) 8.6 - 10.3 mg/dL    Phosphorus 5.5 (H) 2.5 - 4.9 mg/dL    Albumin 2.8 (L) 3.4 - 5.0 g/dL   Magnesium   Result Value Ref Range    Magnesium 2.00 1.60 - 2.40 mg/dL   Vancomycin   Result Value Ref Range    Vancomycin 20.4 (H) 5.0 - 20.0 ug/mL   Morphology   Result Value Ref Range    RBC Morphology See Below     Hypochromia Mild    SST TOP   Result Value Ref Range    Extra Tube Hold for add-ons.    POCT GLUCOSE   Result Value Ref Range    POCT Glucose 221 (H) 74 - 99 mg/dL   POCT GLUCOSE   Result Value Ref Range    POCT Glucose 208 (H) 74 - 99 mg/dL   POCT GLUCOSE   Result Value Ref Range    POCT Glucose 167 (H) 74 - 99 mg/dL      NM Lung perfusion with spect/ct    Result Date: 2/19/2024  Interpreted By:  Rolando Dillon and Osman Sena STUDY: NM LUNG PERFUSION WITH SPECT/CT;  2/19/2024 9:58 am   INDICATION: Signs/Symptoms: 68-year-old female presented with known congestive heart failure. R/o pulmoanry embolism.   COMPARISON: Chest x-ray from 02/18/2024   ACCESSION NUMBER(S): [id number]   ORDERING CLINICIAN: SARAH LEMOS   TECHNIQUE: DIVISION OF NUCLEAR MEDICINE PERFUSION LUNG SCANS   Multiple perfusion images of the lungs were acquired after the intravenous administration of 4.0 mCi of Tc-99m macroaggregated albumin (MAA). In addition,SPECT/CT of the chest was performed.   FINDINGS: Perfusion images of both lungs demonstrate mild heterogeneity throughout the lung fields bilaterally. There are no distinct segmental or subsegmental  perfusion defects. Fissure sign seen at right lung compatible with right pleural effusion.       There are no segmental or subsegmental perfusion defects in both lungs indicating low probability for acute pulmonary embolism. Moderate-sized right pleural effusion.     The interpretation above is based on modified PISAPED criteria.   This study was analyzed and interpreted at [place], [place].   Signed by: Rolando Dillon 2/19/2024 10:38 AM Dictation workstation:   [id number]    ECG 12 Lead    Result Date: 2/19/2024  Junctional rhythm Left axis deviation Inferior infarct , age undetermined Anterolateral infarct (cited on or before 01-FEB-2024) Abnormal ECG When compared with ECG of 01-FEB-2024 02:46, Junctional rhythm has replaced Sinus rhythm Questionable change in initial forces of Lateral leads    Vascular US lower extremity venous duplex bilateral    Result Date: 2/18/2024  Interpreted By:  Washington Peraza, STUDY: [facility] US LOWER EXTREMITY VENOUS DUPLEX BILATERAL  2/18/2024 8:09 pm   INDICATION: 69 y/o   F with  Signs/Symptoms:d dimer > 13,000. LMP:  Unknown.   COMPARISON: None.   ACCESSION NUMBER(S): [id number]   ORDERING CLINICIAN: RAINER BARR   TECHNIQUE: Routine ultrasound of the  bilateral lower extremity was performed with duplex Doppler (color and spectral) evaluation.   Static images were obtained for remote interpretation.   FINDINGS: THIGH VEINS:  The common femoral, femoral, popliteal, proximal medial saphenous, and deep femoral veins are patent and free of thrombus. The veins are normally compressible.  They demonstrate normal phasic flow and augmentation response.   CALF VEINS: Significant soft tissue swelling somewhat limits assessment.       Negative study.  No deep venous thrombosis of the  bilateral lower extremity.  Soft tissue swelling limits assessment   MACRO: None   Signed by: Washington Peraza 2/18/2024 8:41 PM Dictation workstation:   [id number]    XR chest 1  view    Result Date: 2/18/2024  Interpreted By:  Erlin Lui, STUDY: XR CHEST 1 VIEW;  2/18/2024 4:32 pm   INDICATION: Signs/Symptoms:Dyspnea history of CHF.   COMPARISON: CT scan from 02/01/2024.   ACCESSION NUMBER(S): [id number]   ORDERING CLINICIAN: RAINER BARR   TECHNIQUE: Single AP portable view of the chest was obtained.   FINDINGS: MEDIASTINUM/ LUNGS/ TAMMIE: Cardiomegaly. Pulmonary vasculature and interstitium are prominent and indistinct. Slight new pleural and parenchymal opacities at the lateral left lung base. Persistent pleural and parenchymal opacities in the mid and lower right hemithorax. These are grossly stable. Stable calcification in the aorta.   No pneumothorax. No tracheal deviation. No abnormal hilar fullness or gross mass on either side.   BONES: No lytic or blastic destructive bone lesion.  There is mild-to-moderate disc space narrowing and endplate osteophytosis throughout the thoracic spine.   UPPER ABDOMEN: Grossly intact.       Findings of ongoing CHF.   Grossly stable pleural and parenchymal opacities in the mid and lower right hemithorax. Mild new left pleural effusion with left basilar atelectasis or edema.   MACRO: None   Signed by: Erlin Lui 2/18/2024 4:59 PM Dictation workstation:   [id number]    XR chest 2 views    Result Date: 2/14/2024  Interpreted By:  Amando Starks, STUDY: XR CHEST 2 VIEWS; 2/13/2024 8:25 am   INDICATION: Signs/Symptoms:pleural effusion   COMPARISON: Radiographs 02/09/2024   ACCESSION NUMBER(S): [id number]   ORDERING CLINICIAN: TONIA MONTANA   TECHNIQUE: Frontal and lateral radiographs of the chest were obtained.   FINDINGS: LINES AND DEVICES: None.   LUNGS: Stable moderate to large right pleural effusion with adjacent atelectasis. No new focal consolidation, left pleural effusion or pneumothorax.   CARDIOMEDIASTINAL SILHOUETTE: Partially obscured right heart border by adjacent effusion.   OTHER: No acute osseous abnormality.       Stable  moderate to large right pleural effusion.   MACRO None   Signed by: Amando Starks 2/14/2024 9:50 AM Dictation workstation:   [id number]    XR chest 1 view    Result Date: 2/9/2024  Interpreted By:  Sukumar Daugherty, STUDY: XR CHEST 1 VIEW;  2/9/2024 1:13 pm   INDICATION: Signs/Symptoms:fever.   COMPARISON: 02/02/2024   ACCESSION NUMBER(S): [id number]   ORDERING CLINICIAN: TONIA MONTANA   FINDINGS: Significantly increased large right pleural effusion with overlying atelectasis. Left lung grossly clear. Cardiomediastinal silhouette unchanged. Aortic atherosclerosis. Pulmonary vascular congestion.       Large right pleural effusion, significantly increased compared to 1 week ago.     Signed by: Sukumar Daugherty 2/9/2024 3:56 PM Dictation workstation:   [id number]    Cardiac catheterization - non-coronary    Result Date: 2/7/2024   [facility], Cath Lab, [address] [place] [address] Cardiovascular Catheterization Report Patient Name:      MOE MAYA      Performing Physician:  [id number] Nicholas Antonio MD Study Date:        2/7/2024             Verifying Physician:   [id number] Antonio MD MRN/PID:           [id number]             Cardiologist/Co-scrub: Accession#:        [id number]         Ordering Physician:    [id number] DOMONIQUE PURCELL Date of Birth/Age: 1955 / 68 years Fellow: Gender:            F                    Fellow: Encounter#:        [id number]  Study: Right Heart Cath  Indications: MOE MAYA is a 69 year old female who presents with dyslipidemia, hypertension, renal failure, atrial fibrillation, diabetes, peripheral artery disease, chronic pulmonary disease, obesity and Tobacco Use - Current. Heart failure. Pulmonary hypertension and heart failure.  Procedure Description:  After infiltration of local anesthetic, the right internal jugular vein was identified with two-dimensional ultrasound. Under direct ultrasound visualization, the right internal jugular vein was cannulated with a micropuncture technique. A 7 Kazakh sheath was placed in the vein. A balloon tipped catheter was advanced through the right heart to record pressures. Cardiac output was calculated via the Steph method.  Right Heart Catheterization: A balloon tipped catheter was advanced through the right heart to record pressures. Cardiac output was calculated via the Steph method. Elevated left sided filling pressures with normal cardiac output. Cardiac output is normal. Preserved cardiac output at rest. No evidence of shunt. Elevated pulmonary vascular resistance. Elevated systemic vascular resistance. Pulmonary venous hypertension and pulmonary arterial hypertension.  Hemo Personnel: +-------------------------+---------+ Name                     Duty      +-------------------------+---------+ Nicholas Antonio MDPROC MD 1 +-------------------------+---------+  +----------+ Contrast:  +----------+ Omnipaque: +----------+  Hemodynamic Pressures:  +----+---------+----------+------------+-------------+---+-----+-------+-------+ SiteDate TimePhase Name  Systolic    Diastolic  ED Mean A-Wave V-Wave                             mmHg        mmHg     mmHmmHg  mmHg   mmHg                                                    g                     +----+---------+----------+------------+-------------+---+-----+-------+-------+   RA 2/7/2024  AIR REST                                9     13     11       2:16:48                                                                     PM                                                          +----+---------+----------+------------+-------------+---+-----+-------+-------+   RV 2/7/2024  AIR REST          56             3 14                          2:17:18                                                                     PM                                                          +----+---------+----------+------------+-------------+---+-----+-------+-------+   PA 2/7/2024  AIR REST          55           15      30                     2:21:53                                                                     PM                                                          +----+---------+----------+------------+-------------+---+-----+-------+-------+   PW 2/7/2024  AIR REST                               23     20     35       2:23:01                                                                     PM                                                          +----+---------+----------+------------+-------------+---+-----+-------+-------+   PW 2/7/2024  AIR REST                               23     20     38       2:23:28                                                                     PM                                                          +----+---------+----------+------------+-------------+---+-----+-------+-------+   PA 2/7/2024  AIR REST          57           15      33                     2:24:12                                                                     PM                                                          +----+---------+----------+------------+-------------+---+-----+-------+-------+   AO 2/7/2024  AIR REST         176           62      -2                     2:28:01                                                                     PM                                                          +----+---------+----------+------------+-------------+---+-----+-------+-------+  Oxygen Saturation %:  +-----------+----------+------------+ Sample SiteO2 Sat (%)HB (g/100ml) +-----------+----------+------------+          FA        94         8.6 +-----------+----------+------------+          RA        64         8.6 +-----------+----------+------------+          FA        94         8.6 +-----------+----------+------------+          PA        60         8.6 +-----------+----------+------------+  Cardiac Outputs: +---------------+------------------+-------+ ISABELLE CO (l/min)ISABELLE CI (l/min/m2)ISABELLE SV +---------------+------------------+-------+             8.0               3.8  138.5 +---------------+------------------+-------+  Vascular Resistance Calculated Values (Wood Units): +-----+---+----+---+----+ PhasePVRPVRITPRTPRI +-----+---+----+---+----+ 0    1.02.1 4.29.0  +-----+---+----+---+----+  Complications: No in-lab complications observed.  Cardiac Cath Post Procedure Notes: Post Procedure           Moderately elevated PCWP, normal cardiac output, no Diagnosis:               shunts. Blood Loss:              Estimated blood loss during the procedure was 0 mls. Specimens Removed:       Number of specimen(s) removed: none.  Recommendations: Maximize medical therapy. Agressive risk factor modification efforts. Optimize volume status with diuretics. Renal replacement therapy. Patient counseled and advised to discontinue smoking. ____________________________________________________________________________________ CONCLUSIONS:  1. RHC shows moderate pulmonary HTN, mPAP 30-33 mm Hg, mRA 9 mm Hg, normal cardiac output and no shunts.  2. Moderately elevated PCWP. ICD 10 Codes: Acute diastolic (congestive) heart failure (CHF)-I50.31  CPT Codes: Right Heart Cath O2/Cardiac output without biopsy (RHC)-83898; Moderate Sedation Services 1st additional 15 minutes patient >5 years-60513; Luis Crook-[id number]  [id number] Nicholas Antonio MD Performing Physician Electronically signed by [id number]  Nicholas Antonio MD on 2/7/2024 at 5:02:34 PM  ** Final **     Transthoracic Echo (TTE) Complete    Result Date: 2/5/2024   [facility], [address] [place] [address]               Tel [phone] and Fax [phone] TRANSTHORACIC ECHOCARDIOGRAM REPORT  Patient Name:      MOE MAYA      Reading Physician:    [id number] Nicholas Antonio MD Study Date:        2/5/2024             Ordering Provider:    [id number] DOMONIQUE PURCELL MRN/PID:           [id number]             Fellow: Accession#:        [id number]         Nurse: Date of Birth/Age: 1955 / 68 years Sonographer:          Nina Angel RD Gender:            F                    Additional Staff: Height:            167.64 cm            Admit Date:           2/2/2024 Weight:            102.51 kg            Admission Status:     Inpatient -                                                               Routine BSA:               2.11 m2              Encounter#:           [id number]                                         Department Location:  [facility] Non                                                               Invasive Blood Pressure: 147 /65 mmHg Study Type:    TRANSTHORACIC ECHO (TTE) COMPLETE Diagnosis/ICD: Acute combined systolic (congestive) and diastolic (congestive)                heart failure (CHF)-I50.41 Indication:    Congestive Heart Failure CPT Code:      Echo Complete w Full Doppler-43395 Patient History: Diabetes:         Yes Pertinent         A-Fib, HTN, Dyspnea and LE Edema. Watchman device, elevated History:          BNP,. Study Detail: The following Echo studies were performed: 2D, M-Mode, Doppler and               color flow.  PHYSICIAN INTERPRETATION: Left Ventricle: The left ventricular systolic function is  normal, with an estimated ejection fraction of 60-65%. There are no regional wall motion abnormalities. The left ventricular cavity size is normal. Spectral Doppler shows an impaired relaxation pattern of left ventricular diastolic filling. Left Atrium: The left atrium is moderately dilated. Right Ventricle: The right ventricle is mildly enlarged. There is mildly reduced right ventricular systolic function. Right Atrium: The right atrium is moderately dilated. Aortic Valve: The aortic valve is trileaflet. There is mild aortic valve cusp calcification. There is trivial aortic valve regurgitation. The peak instantaneous gradient of the aortic valve is 14.1 mmHg. The mean gradient of the aortic valve is 9.1 mmHg. Mitral Valve: The mitral valve is normal in structure. There is mild mitral annular calcification. There is mild to moderate mitral valve regurgitation. Tricuspid Valve: The tricuspid valve is structurally normal. There is moderate tricuspid regurgitation. Pulmonic Valve: The pulmonic valve is structurally normal. There is mild pulmonic valve regurgitation. Pericardium: There is a small to moderate pericardial effusion posterior to the left ventricle. There is no evidence of cardiac tamponade. Aorta: The aortic root is normal. Pulmonary Artery: The tricuspid regurgitant velocity is 3.04 m/s, and with an estimated right atrial pressure of 3 mmHg, the estimated pulmonary artery pressure is mildly elevated with the RVSP at 39.9 mmHg. Pulmonary Veins: The pulmonary veins appear normal and return normally to the left atrium. Systemic Veins: The inferior vena cava appears to be of normal size. There is IVC inspiratory collapse greater than 50%.  CONCLUSIONS:  1. Left ventricular systolic function is normal with a 60-65% estimated ejection fraction.  2. Spectral Doppler shows an impaired relaxation pattern of left ventricular diastolic filling.  3. There is mildly reduced right ventricular systolic function.  4. The  left atrium is moderately dilated.  5. The right atrium is moderately dilated.  6. There is a small to moderate pericardial effusion.  7. There is no evidence of cardiac tamponade.  8. Mild to moderate mitral valve regurgitation.  9. Moderate tricuspid regurgitation visualized. 10. Normal CVP. Estimated PASP around 45 mm Hg. QUANTITATIVE DATA SUMMARY: 2D MEASUREMENTS:                           Normal Ranges: IVSd:          1.52 cm    (0.6-1.1cm) LVPWd:         1.42 cm    (0.6-1.1cm) LVIDd:         4.35 cm    (3.9-5.9cm) LVIDs:         2.94 cm LV Mass Index: 120.7 g/m2 LV % FS        32.3 % LA VOLUME:                              Normal Ranges: LA Vol A4C:       79.5 ml    (22+/-6mL/m2) LA Vol A2C:       78.2 ml LA Vol BP:        79.7 ml LA Vol Index A4C: 37.7ml/m2 LA Vol Index A2C: 37.1 ml/m2 LA Vol Index BP:  37.9 ml/m2 LA Volume Index:  37.8 ml/m2 LA Vol A4C:       76.0 ml LA Vol A2C:       73.9 ml RA VOLUME BY A/L METHOD:                       Normal Ranges: RA Area A4C: 17.8 cm2 M-MODE MEASUREMENTS:                  Normal Ranges: Ao Root: 3.00 cm (2.0-3.7cm) LAs:     5.08 cm (2.7-4.0cm) LV SYSTOLIC FUNCTION BY 2D PLANIMETRY (MOD):                     Normal Ranges: EF-A4C View: 63.4 % (>=55%) EF-A2C View: 63.8 % EF-Biplane:  62.5 % LV DIASTOLIC FUNCTION:                             Normal Ranges: MV Peak E:      1.33 m/s    (0.7-1.2 m/s) MV Peak A:      0.53 m/s    (0.42-0.7 m/s) E/A Ratio:      2.52        (1.0-2.2) MV e'           0.06 m/s    (>8.0) MV lateral e'   0.06 m/s MV medial e'    0.06 m/s MV A Dur:       110.73 msec E/e' Ratio:     22.09       (<8.0) PulmV Sys Mike:  53.97 cm/s PulmV Dugan Mike: 77.63 cm/s PulmV S/D Mike:  0.70 MITRAL VALVE:                 Normal Ranges: MV DT: 245 msec (150-240msec) MITRAL INSUFFICIENCY:                         Normal Ranges: MR VTI:     171.38 cm MR Vmax:    489.73 cm/s MR Volume:  20.07 ml MR Flow Rt: 57.36 ml/s MR EROA:    0.12 cm2 AORTIC VALVE:                                     Normal Ranges: AoV Vmax:                1.88 m/s  (<=1.7m/s) AoV Peak P.1 mmHg (<20mmHg) AoV Mean P.1 mmHg  (1.7-11.5mmHg) LVOT Max Mike:            0.97 m/s  (<=1.1m/s) AoV VTI:                 46.22 cm  (18-25cm) LVOT VTI:                24.89 cm LVOT Diameter:           1.96 cm   (1.8-2.4cm) AoV Area, VTI:           1.62 cm2  (2.5-5.5cm2) AoV Area,Vmax:           1.56 cm2  (2.5-4.5cm2) AoV Dimensionless Index: 0.54  RIGHT VENTRICLE: RV Basal 3.80 cm RV Mid   2.80 cm RV Major 8.0 cm TAPSE:   18.0 mm RV s'    0.13 m/s TRICUSPID VALVE/RVSP:                             Normal Ranges: Peak TR Velocity: 3.04 m/s RV Syst Pressure: 39.9 mmHg (< 30mmHg) PULMONIC VALVE:                      Normal Ranges: PV Max Mike: 1.0 m/s  (0.6-0.9m/s) PV Max PG:  3.9 mmHg Pulmonary Veins: PulmV Dugan Mike: 77.63 cm/s PulmV S/D Mike:  0.70 PulmV Sys Mike:  53.97 cm/s  [id number] Nicholas Antonio MD Electronically signed on 2024 at 5:06:05 PM  ** Final **     XR knee left 4+ views    Result Date: 2024  Interpreted By:  Adama Jonas, STUDY: Left knee dated  2024.   INDICATION: Signs/Symptoms:left kne pain   COMPARISON: Radiographs dated 10/23/2018.   ACCESSION NUMBER(S): [id number]   ORDERING CLINICIAN: JOSUÉ REICH   TECHNIQUE: Four views of the left knee.   FINDINGS: No fracture or dislocation is evident.  There is a small knee joint effusion. There is moderate tricompartmental degenerative change of the knee. Reticular increased density is seen in the subcutaneous tissues.       1. Small joint effusion and degenerative change of the knee without osseous injury evident. Knowledge of any trauma may be helpful. If there is persistent concern for osseous injury, cross-sectional imaging can be considered. 2. Reticular increased density in the subcutaneous tissues which may represent lymphedema and/or cellulitis.   MACRO: None   Signed by: Adama Jonas 2024 4:24 PM  Dictation workstation:   [id number]    XR chest 1 view    Result Date: 2/2/2024  Interpreted By:  Rayna Carson, STUDY: XR CHEST 1 VIEW;  2/2/2024 2:29 pm   INDICATION: Signs/Symptoms:S/p thoracentesis.   COMPARISON: 02/01/2024   ACCESSION NUMBER(S): [id number]   ORDERING CLINICIAN: AIDAN HERNANDEZ   TECHNIQUE: Portable AP upright   FINDINGS: The cardiac silhouette is enlarged but unchanged. Aorta is atherosclerotic. There is marked improvement in the right pleural effusion since the prior study as a result of interval thoracentesis. No pneumothorax is present. There is minimal fluid residual blunting the costophrenic angle. There is minimal atelectasis at the right lung base. Pulmonary vasculature appears congested. No interval change is noted in the osseous structures.       Marked interval improvement in the right pleural effusion with interval thoracentesis. No pneumothorax.   Pulmonary vascular congestion   Minimal atelectasis right lung base   MACRO: None.   Signed by: Rayna Carson 2/2/2024 3:23 PM Dictation workstation:   [id number]    US renal complete    Result Date: 2/2/2024  Interpreted By:  Amando Starks, STUDY: US RENAL COMPLETE; 2/2/2024 12:23 pm   INDICATION: Signs/Symptoms:ANDREA on CKD.   COMPARISON: Renal ultrasound 05/01/2023   ACCESSION NUMBER(S): [id number]   ORDERING CLINICIAN: JESSICA EPPS   TECHNIQUE: Sonography of the kidneys and urinary bladder was performed.   FINDINGS: Right Kidney: Right total nephrectomy.   Left Kidney: *Renal length: 10.6 cm *Parenchyma: Normal parenchymal echogenicity. Normal parenchymal thickness. *Collecting system: No hydronephrosis. *Calculus: No echogenic, shadowing calculus. *Lesion: None.   Bladder: Normal sonographic appearance.       No left hydronephrosis. Right total nephrectomy.   MACRO: None   Signed by: Amando Starks 2/2/2024 2:36 PM Dictation workstation:   [id number]    ECG 12 lead    Result Date: 2/1/2024  Sinus bradycardia Left  axis deviation Low voltage QRS Possible Anterolateral infarct , age undetermined Abnormal ECG When compared with ECG of 19-JUL-2023 14:12, Previous ECG has undetermined rhythm, needs review Left bundle branch block is no longer Present Borderline criteria for Anterior infarct are now Present Borderline criteria for Anterolateral infarct are now Present See ED provider note for full interpretation and clinical correlation Confirmed by Wanda Juan ([id number]) on 2/1/2024 10:32:50 AM    CT chest wo IV contrast    Result Date: 2/1/2024  Interpreted By:  Beulah Chacon, STUDY: CT CHEST WO IV CONTRAST;  2/1/2024 6:40 am   INDICATION: Signs/Symptoms:sob   COMPARISON: Chest x-ray 02/01/2024. CT chest 05/04/2021   ACCESSION NUMBER(S): [id number]   ORDERING CLINICIAN: BHARGAV SWEET   TECHNIQUE: Axial CT images were obtained through the chest with no intravenous contrast administration.  Coronal and sagittal reformats were performed.   FINDINGS: There is motion artifact.   HEART AND VESSELS: No thoracic aortic aneurysm. Atherosclerotic calcifications are noted at the thoracic aorta. The main pulmonary artery is dilated. The heart is enlarged.   There is a small pericardial effusion. Status post left atrial appendage occlusion device placement.   MEDIASTINUM AND TAMMIE, LOWER NECK AND AXILLA: There is a 1.1 cm peripherally calcified nodule at the left lobe of the thyroid gland.   No obvious pathologically enlarged lymph nodes on unenhanced CT.   LUNGS AND AIRWAYS: The trachea and central airways are patent.   The evaluation of the lungs is degraded by motion artifact. There is a large right pleural effusion with complete collapse of the right lower lobe. Atelectasis/consolidation at right upper lobe and right middle lobe. Tree-in-bud airspace opacities at the right upper lobe and left lower lobe. There is mild atelectasis at the left lower lobe. No left pleural effusion. No pneumothorax.   UPPER ABDOMEN: Motion artifact.  No obvious acute findings.   CHEST WALL AND OSSEOUS STRUCTURES: There is soft tissue edema at the chest wall and visualized abdominal wall. The evaluation for acute fracture is degraded by motion artifact. Multilevel degenerative changes of the spine.       1. Study degraded by motion artifact. Large right pleural effusion with complete collapse of the right lower lobe. Atelectasis/consolidation at the right upper lobe and right middle lobe. Follow-up CT after treatment recommended to ensure complete resolution and exclude underlying mass. 2. Tree-in-bud airspace opacities at the right upper lobe and left lower lobe, may be seen with acute infection/inflammation of the smaller airways such as bronchiolitis or bronchopneumonia. 3. Mild atelectasis at the left lower lobe. 4. Cardiomegaly. Small pericardial effusion. Dilated main pulmonary artery, please correlate for pulmonary arterial hypertension. 5. 1.1 cm peripherally calcified nodule at the left lobe of the thyroid gland. This can be further characterized with nonemergent thyroid ultrasound. 6. Soft tissue edema at the chest wall and visualized abdominal wall.         MACRO:   Critical Finding:  See findings. Notification was initiated on 2/1/2024 at 6:50 am by  Beulah Chacon.  (**-YCF-**) Instructions:   Signed by: Beulah Chacon 2/1/2024 6:56 AM Dictation workstation:   [id number]    XR chest 1 view    Result Date: 2/1/2024  Interpreted By:  Lesley Dooley, STUDY: XR CHEST 1 VIEW;  2/1/2024 3:03 am   INDICATION: Signs/Symptoms:sob.   COMPARISON: 07/19/2023   ACCESSION NUMBER(S): [id number]   ORDERING CLINICIAN: BHARGAV SWEET   FINDINGS:     CARDIOMEDIASTINAL SILHOUETTE: Stable cardiomegaly. Vague density overlying the upper cardiac silhouette may represent a left atrial appendage closure device.   LUNGS: Opacity involving the mid to lower right thorax, likely combination of large right pleural effusion and atelectasis. No pneumothorax.   ABDOMEN: No  remarkable upper abdominal findings.   BONES: No acute osseous abnormality.       New large right pleural effusion and basilar atelectasis. Underlying pneumonia is not excluded in the appropriate clinical setting.   MACRO: None   Signed by: Lesley Dooley 2/1/2024 3:19 AM Dictation workstation:   [id number]      Assessment/Plan        Principal Problem:    Heart failure (CMS/[facility])      Daphne Morris is a 68 y.o. female presenting with past medical history of heart failure, atrial fibrillation, Hypertension, type 2 DM transfer from [facility] ER due to acute hypoxic respiratory failure secondary to pneumonia, COPD exacerbation and acute diastolic heart failure.     #Acute hypoxic respiratory failure secondary to acute on chronic diastolic heart failure and influenza B pneumonia  -Seen and examined  -Hemodynamically stable satting well on HFNC  -Labs showed hemoglobin of 6.4>>7.4, serum creatinine of 3.2>> 3.5  -Echocardiogram 2/05/24/revealed LVEF of 60-65%, impaired relaxation, mod dilated atria  -Right heart cath February 7, 2024 revealed moderate pulmonary hypertension, mPAP of 30 to 33 mmHg, mRA 9 mmHg, normal cardiac output, moderately elevated PCWP  -BNP 1444  --Chest x-ray with stable pleural and parenchymal opacities and new left pleural effusion with left basilar atelectasis or edema  Continue BiPAP 22/5m wean O2 as tolerated /  on O2 at 2l/min at baseline  We will start Lasix drip and cardiac diet with 1.5 L fluid restriction  Continue renally dosed Tamiflu  Strict I's and O's and cardiac diet  -Pulmonary on board, patient also on prednisone, Tamiflu, If no improvement ro consider thoracentesis  -Cardiology on board, unable to do left heart cath due to elevated creatinine, unable to start SGLT2 inhibitors as well  -Nephrology on board a Lasix drip on hold, family considering dialysis  -Meeting with hospice tomorrow at 10 AM    #Stage III/IV chronic kidney disease  Status post right nephrectomy  Renal  ultrasound negative  Renal function seems to be near baseline  Nephrology on board plan to continue with Lasix nephrology team discussing dialysis with the patient and her family    #Anemia  -Status post 1 unit of blood   -FOBT positive  -A iron saturation of 11 iron level of 24 ferritin in 200  -Bleeding precautions  -Will consider iron supplements on discharge  -Will consider GI consult    #D-Dimer 13,011  Possibly related to flu  Ultrasound ruled out DVT  VQ scan low probability of PE     #Lower extremity cellulitis  To diuresis, leg elevation  Continue vancomycin     #Elevated troponin  Suspect demand ischemia  EKG with no significant changes from prior EKG  Continue aspirin, Plavix, statin, metoprolol  Cardiology following         COPD  Does not appear to be in acute exacerbation  Continue DuoNebs       Paroxysmal atrial fibrillation  Status post watchman in August 2023  Currently in sinus rhythm  Continue amiodarone and metoprolol     Type 2 diabetes mellitus  Continue insulin sliding scale  Holding Lantus due to limited oral intake  Holding Januvia  Continue diabetic diet  Follow Accu-Cheks     Hypertension  Cardiac diet  Neurovascular metoprolol continued  Monitor vitals     GERD  Continue PPI     Hyperlipidemia  Continue atorvastatin     Depression  Continue venlafaxine     Hypothyroidism  Continue Synthroid     DVT prophylaxis  Heparin             Disposition  -Hospice meeting with family today at 10 AM       Myke Newsome MD

## 2024-02-24 LAB
ALBUMIN SERPL BCP-MCNC: 2.7 G/DL (ref 3.4–5)
ANION GAP BLDV CALCULATED.4IONS-SCNC: 5 MMOL/L (ref 10–25)
ANION GAP SERPL CALC-SCNC: 15 MMOL/L (ref 10–20)
ATRIAL RATE: 267 BPM
BASE EXCESS BLDV CALC-SCNC: 8.3 MMOL/L (ref -2–3)
BASOPHILS # BLD AUTO: 0 X10*3/UL (ref 0–0.1)
BASOPHILS NFR BLD AUTO: 0 %
BODY TEMPERATURE: ABNORMAL
BUN SERPL-MCNC: 88 MG/DL (ref 6–23)
CA-I BLDV-SCNC: 1.11 MMOL/L (ref 1.1–1.33)
CALCIUM SERPL-MCNC: 7.6 MG/DL (ref 8.6–10.3)
CHLORIDE BLDV-SCNC: 96 MMOL/L (ref 98–107)
CHLORIDE SERPL-SCNC: 94 MMOL/L (ref 98–107)
CO2 SERPL-SCNC: 29 MMOL/L (ref 21–32)
CREAT SERPL-MCNC: 4.09 MG/DL (ref 0.5–1.05)
EGFRCR SERPLBLD CKD-EPI 2021: 11 ML/MIN/1.73M*2
EOSINOPHIL # BLD AUTO: 0 X10*3/UL (ref 0–0.7)
EOSINOPHIL NFR BLD AUTO: 0 %
ERYTHROCYTE [DISTWIDTH] IN BLOOD BY AUTOMATED COUNT: 13.5 % (ref 11.5–14.5)
GLUCOSE BLD MANUAL STRIP-MCNC: 188 MG/DL (ref 74–99)
GLUCOSE BLD MANUAL STRIP-MCNC: 192 MG/DL (ref 74–99)
GLUCOSE BLD MANUAL STRIP-MCNC: 212 MG/DL (ref 74–99)
GLUCOSE BLD MANUAL STRIP-MCNC: 255 MG/DL (ref 74–99)
GLUCOSE BLDV-MCNC: 301 MG/DL (ref 74–99)
GLUCOSE SERPL-MCNC: 251 MG/DL (ref 74–99)
HCO3 BLDV-SCNC: 34.9 MMOL/L (ref 22–26)
HCT VFR BLD AUTO: 25.5 % (ref 36–46)
HCT VFR BLD EST: 31 % (ref 36–46)
HGB BLD-MCNC: 7.9 G/DL (ref 12–16)
HGB BLDV-MCNC: 10.2 G/DL (ref 12–16)
IMM GRANULOCYTES # BLD AUTO: 0.07 X10*3/UL (ref 0–0.7)
IMM GRANULOCYTES NFR BLD AUTO: 1.1 % (ref 0–0.9)
INHALED O2 CONCENTRATION: 4 %
LACTATE BLDV-SCNC: 0.7 MMOL/L (ref 0.4–2)
LYMPHOCYTES # BLD AUTO: 0.63 X10*3/UL (ref 1.2–4.8)
LYMPHOCYTES NFR BLD AUTO: 10.2 %
MAGNESIUM SERPL-MCNC: 2.04 MG/DL (ref 1.6–2.4)
MCH RBC QN AUTO: 30 PG (ref 26–34)
MCHC RBC AUTO-ENTMCNC: 31 G/DL (ref 32–36)
MCV RBC AUTO: 97 FL (ref 80–100)
MONOCYTES # BLD AUTO: 0.38 X10*3/UL (ref 0.1–1)
MONOCYTES NFR BLD AUTO: 6.1 %
NEUTROPHILS # BLD AUTO: 5.11 X10*3/UL (ref 1.2–7.7)
NEUTROPHILS NFR BLD AUTO: 82.6 %
NRBC BLD-RTO: 0 /100 WBCS (ref 0–0)
OXYHGB MFR BLDV: 79.2 % (ref 45–75)
P OFFSET: 153 MS
P ONSET: 130 MS
PCO2 BLDV: 59 MM HG (ref 41–51)
PH BLDV: 7.38 PH (ref 7.33–7.43)
PHOSPHATE SERPL-MCNC: 5.5 MG/DL (ref 2.5–4.9)
PLATELET # BLD AUTO: 325 X10*3/UL (ref 150–450)
PO2 BLDV: 50 MM HG (ref 35–45)
POTASSIUM BLDV-SCNC: 5.2 MMOL/L (ref 3.5–5.3)
POTASSIUM SERPL-SCNC: 4.9 MMOL/L (ref 3.5–5.3)
Q ONSET: 223 MS
QRS COUNT: 10 BEATS
QRS DURATION: 112 MS
QT INTERVAL: 456 MS
QTC CALCULATION(BAZETT): 459 MS
QTC FREDERICIA: 458 MS
R AXIS: -57 DEGREES
RBC # BLD AUTO: 2.63 X10*6/UL (ref 4–5.2)
SAO2 % BLDV: 81 % (ref 45–75)
SODIUM BLDV-SCNC: 131 MMOL/L (ref 136–145)
SODIUM SERPL-SCNC: 133 MMOL/L (ref 136–145)
T AXIS: 95 DEGREES
T OFFSET: 451 MS
VANCOMYCIN SERPL-MCNC: 18.2 UG/ML (ref 5–20)
VENTRICULAR RATE: 61 BPM
WBC # BLD AUTO: 6.2 X10*3/UL (ref 4.4–11.3)

## 2024-02-24 PROCEDURE — 2500000001 HC RX 250 WO HCPCS SELF ADMINISTERED DRUGS (ALT 637 FOR MEDICARE OP)

## 2024-02-24 PROCEDURE — 94640 AIRWAY INHALATION TREATMENT: CPT

## 2024-02-24 PROCEDURE — 83735 ASSAY OF MAGNESIUM: CPT | Performed by: INTERNAL MEDICINE

## 2024-02-24 PROCEDURE — 2500000005 HC RX 250 GENERAL PHARMACY W/O HCPCS: Performed by: INTERNAL MEDICINE

## 2024-02-24 PROCEDURE — 99233 SBSQ HOSP IP/OBS HIGH 50: CPT | Performed by: INTERNAL MEDICINE

## 2024-02-24 PROCEDURE — 80202 ASSAY OF VANCOMYCIN: CPT | Performed by: INTERNAL MEDICINE

## 2024-02-24 PROCEDURE — 2500000004 HC RX 250 GENERAL PHARMACY W/ HCPCS (ALT 636 FOR OP/ED)

## 2024-02-24 PROCEDURE — 2500000002 HC RX 250 W HCPCS SELF ADMINISTERED DRUGS (ALT 637 FOR MEDICARE OP, ALT 636 FOR OP/ED): Performed by: FAMILY MEDICINE

## 2024-02-24 PROCEDURE — 94668 MNPJ CHEST WALL SBSQ: CPT

## 2024-02-24 PROCEDURE — 1200000002 HC GENERAL ROOM WITH TELEMETRY DAILY

## 2024-02-24 PROCEDURE — 82947 ASSAY GLUCOSE BLOOD QUANT: CPT

## 2024-02-24 PROCEDURE — 2500000002 HC RX 250 W HCPCS SELF ADMINISTERED DRUGS (ALT 637 FOR MEDICARE OP, ALT 636 FOR OP/ED)

## 2024-02-24 PROCEDURE — 84132 ASSAY OF SERUM POTASSIUM: CPT

## 2024-02-24 PROCEDURE — 85025 COMPLETE CBC W/AUTO DIFF WBC: CPT | Performed by: INTERNAL MEDICINE

## 2024-02-24 PROCEDURE — 9420000001 HC RT PATIENT EDUCATION 5 MIN

## 2024-02-24 PROCEDURE — 84132 ASSAY OF SERUM POTASSIUM: CPT | Performed by: INTERNAL MEDICINE

## 2024-02-24 RX ADMIN — IPRATROPIUM BROMIDE AND ALBUTEROL SULFATE 3 ML: 2.5; .5 SOLUTION RESPIRATORY (INHALATION) at 20:24

## 2024-02-24 RX ADMIN — METOPROLOL SUCCINATE 50 MG: 50 TABLET, EXTENDED RELEASE ORAL at 08:42

## 2024-02-24 RX ADMIN — METOPROLOL SUCCINATE 50 MG: 50 TABLET, EXTENDED RELEASE ORAL at 21:20

## 2024-02-24 RX ADMIN — INSULIN LISPRO 6 UNITS: 100 INJECTION, SOLUTION INTRAVENOUS; SUBCUTANEOUS at 17:22

## 2024-02-24 RX ADMIN — POLYETHYLENE GLYCOL 3350 17 G: 17 POWDER, FOR SOLUTION ORAL at 08:42

## 2024-02-24 RX ADMIN — AMLODIPINE BESYLATE 5 MG: 5 TABLET ORAL at 08:42

## 2024-02-24 RX ADMIN — Medication 4 L/MIN: at 08:40

## 2024-02-24 RX ADMIN — ASPIRIN 81 MG: 81 TABLET, COATED ORAL at 08:42

## 2024-02-24 RX ADMIN — AMIODARONE HYDROCHLORIDE 200 MG: 200 TABLET ORAL at 08:42

## 2024-02-24 RX ADMIN — IPRATROPIUM BROMIDE AND ALBUTEROL SULFATE 3 ML: 2.5; .5 SOLUTION RESPIRATORY (INHALATION) at 08:40

## 2024-02-24 RX ADMIN — INSULIN LISPRO 9 UNITS: 100 INJECTION, SOLUTION INTRAVENOUS; SUBCUTANEOUS at 08:49

## 2024-02-24 RX ADMIN — INSULIN LISPRO 3 UNITS: 100 INJECTION, SOLUTION INTRAVENOUS; SUBCUTANEOUS at 13:25

## 2024-02-24 RX ADMIN — Medication 5 MG: at 21:20

## 2024-02-24 RX ADMIN — LEVOTHYROXINE SODIUM 200 MCG: 100 TABLET ORAL at 05:14

## 2024-02-24 RX ADMIN — VENLAFAXINE HYDROCHLORIDE 75 MG: 75 CAPSULE, EXTENDED RELEASE ORAL at 08:42

## 2024-02-24 RX ADMIN — ATORVASTATIN CALCIUM 40 MG: 40 TABLET, FILM COATED ORAL at 08:42

## 2024-02-24 RX ADMIN — PREDNISONE 40 MG: 20 TABLET ORAL at 08:42

## 2024-02-24 RX ADMIN — IPRATROPIUM BROMIDE AND ALBUTEROL SULFATE 3 ML: 2.5; .5 SOLUTION RESPIRATORY (INHALATION) at 14:59

## 2024-02-24 RX ADMIN — PANTOPRAZOLE SODIUM 40 MG: 40 TABLET, DELAYED RELEASE ORAL at 21:20

## 2024-02-24 RX ADMIN — FERROUS GLUCONATE 324 MG: 324 TABLET ORAL at 08:42

## 2024-02-24 RX ADMIN — PANTOPRAZOLE SODIUM 40 MG: 40 TABLET, DELAYED RELEASE ORAL at 08:42

## 2024-02-24 RX ADMIN — CLOPIDOGREL 75 MG: 75 TABLET ORAL at 08:42

## 2024-02-24 ASSESSMENT — COGNITIVE AND FUNCTIONAL STATUS - GENERAL
PERSONAL GROOMING: A LOT
STANDING UP FROM CHAIR USING ARMS: A LOT
HELP NEEDED FOR BATHING: A LOT
DRESSING REGULAR UPPER BODY CLOTHING: A LOT
WALKING IN HOSPITAL ROOM: A LOT
DAILY ACTIVITIY SCORE: 13
PERSONAL GROOMING: A LOT
MOVING TO AND FROM BED TO CHAIR: A LOT
DRESSING REGULAR UPPER BODY CLOTHING: A LOT
MOVING TO AND FROM BED TO CHAIR: A LOT
CLIMB 3 TO 5 STEPS WITH RAILING: A LOT
CLIMB 3 TO 5 STEPS WITH RAILING: A LOT
EATING MEALS: A LITTLE
MOVING FROM LYING ON BACK TO SITTING ON SIDE OF FLAT BED WITH BEDRAILS: A LOT
DRESSING REGULAR LOWER BODY CLOTHING: A LOT
MOVING FROM LYING ON BACK TO SITTING ON SIDE OF FLAT BED WITH BEDRAILS: A LOT
TOILETING: A LOT
TURNING FROM BACK TO SIDE WHILE IN FLAT BAD: A LOT
HELP NEEDED FOR BATHING: A LOT
EATING MEALS: A LITTLE
STANDING UP FROM CHAIR USING ARMS: A LOT
MOBILITY SCORE: 12
TOILETING: A LOT
WALKING IN HOSPITAL ROOM: A LOT
MOBILITY SCORE: 12
DAILY ACTIVITIY SCORE: 13
DRESSING REGULAR LOWER BODY CLOTHING: A LOT
TURNING FROM BACK TO SIDE WHILE IN FLAT BAD: A LOT

## 2024-02-24 ASSESSMENT — PAIN SCALES - GENERAL
PAINLEVEL_OUTOF10: 0 - NO PAIN
PAINLEVEL_OUTOF10: 0 - NO PAIN

## 2024-02-24 ASSESSMENT — PAIN - FUNCTIONAL ASSESSMENT: PAIN_FUNCTIONAL_ASSESSMENT: 0-10

## 2024-02-24 NOTE — PROGRESS NOTES
Department of Medicine  Division of Pulmonary, Critical Care, and Sleep Medicine    Daphne Morris is a 68 y.o. female on day 5 of admission presenting with Heart failure (CMS/HCC).    Subjective   Stable, no events last night       Objective     Vital Signs      2/23/2024    10:41 AM 2/23/2024     3:21 PM 2/23/2024     8:20 PM 2/23/2024     8:26 PM 2/24/2024     1:20 AM 2/24/2024     4:33 AM 2/24/2024     9:05 AM   Vitals   Systolic 138 146 144 149 126 158 147   Diastolic 71 65 77 76 74 80 76   Heart Rate 60 58 59 60 61 67 61   Temp 36.8 °C (98.2 °F) 35.6 °C (96.1 °F) 36 °C (96.8 °F) 36.8 °C (98.2 °F)  37 °C (98.6 °F) 36.7 °C (98.1 °F)   Resp 21 19 18 18 20 21 22        Physical exam  Constitutional: Normal appearance.  HEENT: Normocephalic and atraumatic.  Cardiovascular: Normal rate and regular rhythm.  Pulmonary: Diffuse expiratory wheeze, coarse breath sounds.  Musculoskeletal: No edema, no cyanosis.  Neurological: Awake, alert and oriented x3.  Psychiatric: Normal behavior, mood and affect.    Labs:  Lab Results   Component Value Date    WBC 6.2 02/24/2024    HGB 7.9 (L) 02/24/2024    HCT 25.5 (L) 02/24/2024    MCV 97 02/24/2024     02/24/2024      Lab Results   Component Value Date    GLUCOSE 251 (H) 02/24/2024    CALCIUM 7.6 (L) 02/24/2024     (L) 02/24/2024    K 4.9 02/24/2024    CO2 29 02/24/2024    CL 94 (L) 02/24/2024    BUN 88 (H) 02/24/2024    CREATININE 4.09 (H) 02/24/2024      Lab Results   Component Value Date    ALT 13 02/19/2024    AST 20 02/19/2024    ALKPHOS 71 02/19/2024    BILITOT 0.3 02/19/2024        Oxygen Therapy  SpO2: 94 %  Medical Gas Therapy: Supplemental oxygen  O2 Delivery Method: High flow nasal cannula  FiO2 (%):  [36 %] 36 %    Intake/Output last 3 Shifts:  I/O last 3 completed shifts:  In: 240 (2.4 mL/kg) [P.O.:240]  Out: 700 (7.1 mL/kg) [Urine:700 (0.2 mL/kg/hr)]  Weight: 98.5 kg       Medications   Scheduled medications  amiodarone, 200 mg, oral, Daily with  breakfast  amLODIPine, 5 mg, oral, Daily  aspirin, 81 mg, oral, Daily  atorvastatin, 40 mg, oral, Daily  clopidogrel, 75 mg, oral, Daily  ferrous gluconate, 324 mg, oral, Daily with breakfast  [Held by provider] furosemide, 80 mg, oral, BID  [Held by provider] heparin (porcine), 5,000 Units, subcutaneous, q8h  [Held by provider] insulin glargine, 14 Units, subcutaneous, Nightly  insulin lispro, 0-15 Units, subcutaneous, TID with meals  ipratropium-albuteroL, 3 mL, nebulization, TID  levothyroxine, 200 mcg, oral, Daily before breakfast  lidocaine, 5 mL, infiltration, Once  melatonin, 5 mg, oral, Daily  metoprolol succinate XL, 50 mg, oral, BID  pantoprazole, 40 mg, oral, BID  polyethylene glycol, 17 g, oral, Daily  potassium chloride CR, 20 mEq, oral, Daily  potassium chloride CR, 40 mEq, oral, Daily  predniSONE, 40 mg, oral, Daily  venlafaxine XR, 75 mg, oral, Daily      Continuous medications  [Held by provider] furosemide, 10 mg/hr, Last Rate: 10 mg/hr (02/22/24 1927)      PRN medications  PRN medications: acetaminophen **OR** acetaminophen **OR** acetaminophen, acetaminophen **OR** acetaminophen **OR** acetaminophen, albuterol, dextrose 10 % in water (D10W), dextrose, diphenhydrAMINE, glucagon, ipratropium-albuteroL, ondansetron **OR** ondansetron, oxygen, oxygen, vancomycin     Allergies  Adhesive tape-silicones, Amoxicillin, Bee venom protein (honey bee), Codeine, Doxycycline, Latex, Lisinopril, Prednisone, Citalopram, Oseltamivir, and Phenyleph-shark oil-glyc-pet    Chest Radiograph   No results found for this or any previous visit from the past 10 days.       XR chest 1 view 02/21/2024    Narrative  Interpreted By:  Mario Sampson,  STUDY:  XR CHEST 1 VIEW;  2/21/2024 11:37 am    INDICATION:  Signs/Symptoms:SOB.    COMPARISON:  02/18/2024 AP and 02/13/2024 AP and lateral chest x-rays and the  02/01/2024 unenhanced thoracic CT.    ACCESSION NUMBER(S):  Multilevel spinal degenerative changes are noted.  [id number]    ORDERING CLINICIAN:  JIM ADORNO    FINDINGS:  Portable AP upright chest x-ray 02/21/2024:        CARDIOMEDIASTINAL SILHOUETTE:  The heart seems enlarged, but its size is likely to be exaggerated by  the AP lordotic projection. Vascular calcification and a left atrial  appendage Watchman device are noted. Mild coronary artery  calcification demonstrated on CT can not be appreciated.    LUNGS:  A large right pleural effusion demonstrated on the above studies  markedly limits evaluation of the right lung. New left retrocardiac  opacity suggests lower lobe infiltrate or atelectasis as the left  lateral costophrenic angle is sharp. Pulmonary vascular cephalization  suggests congestive cardiac failure, fluid overload or uremia. Refer  to the 02/01/2024 CT report for further details.    ABDOMEN:  No remarkable upper abdominal findings.    BONES:  No acute osseous changes. Mild multilevel spinal degenerative changes  are noted.    Impression  1. Large right pleural effusion similar to recent prior studies.  2. Pulmonary venous hypertension.  3. Cardiomegaly.  4. Probable left lower lobe infiltrate or atelectasis.        MACRO:  None    Signed by: Mario Sampson 2/21/2024 11:57 AM  Dictation workstation:   [id number]      XR chest 1 view 02/18/2024    Narrative  Interpreted By:  Erlin Lui,  STUDY:  XR CHEST 1 VIEW;  2/18/2024 4:32 pm    INDICATION:  Signs/Symptoms:Dyspnea history of CHF.    COMPARISON:  CT scan from 02/01/2024.    ACCESSION NUMBER(S):  [id number]    ORDERING CLINICIAN:  RAINER BARR    TECHNIQUE:  Single AP portable view of the chest was obtained.    FINDINGS:  MEDIASTINUM/ LUNGS/ TAMMIE:  Cardiomegaly. Pulmonary vasculature and interstitium are prominent  and indistinct. Slight new pleural and parenchymal opacities at the  lateral left lung base. Persistent pleural and parenchymal opacities  in the mid and lower right hemithorax. These are grossly stable.  Stable  calcification in the aorta.    No pneumothorax.  No tracheal deviation.  No abnormal hilar fullness or gross mass on either side.    BONES:  No lytic or blastic destructive bone lesion.  There is  mild-to-moderate disc space narrowing and endplate osteophytosis  throughout the thoracic spine.    UPPER ABDOMEN:  Grossly intact.    Impression  Findings of ongoing CHF.    Grossly stable pleural and parenchymal opacities in the mid and lower  right hemithorax. Mild new left pleural effusion with left basilar  atelectasis or edema.    MACRO:  None    Signed by: Erlin Lui 2/18/2024 4:59 PM  Dictation workstation:   [id number]         Assessment and Plan / Recommendations   Assessment/Plan   68-year-old woman with history of COPD on 2 L at baseline admitted with acute on chronic respiratory failure     1.  Acute on chronic hypoxic and hypercapnic respiratory failure  2.  Acute on chronic diastolic heart failure  3.  Right pleural effusion post recent thoracentesis, transudative likely due to CHF  4.  COPD exacerbation  5.  Influenza B     Improving, on 3-4L nc.  Venous blood gas this morning no respiratory acidosis     -continue prednisone  -DuoNebs  -completed Tamiflu  -Diuresis per primary/nephrology, renal function worsening  -encourage incentive spirometry, wean O2 as tolerated        Delores Colby MD

## 2024-02-24 NOTE — NURSING NOTE
Patient: Daphne Morris    Date: 2/24/2024  Time: 11:42 AM    Informational Only Meeting    [facility] Nurse Notes  - Patient assessed at bedside: lying in bed, alert, Ox3, forgetful at times, no c/o pain or shortness of breath. Visibly short of breath and anxious at times.   - Met with patient, nephew/reported DPOAHC Silver Lima, friend/reported alternate DPOAHC Doug Feliciano, and friend/Vishnu at bedside. Discussed [facility], POC, and philosophy. Reviewed services provided in home setting and symptom control/transitions (if qualifies) at [facility]. Patient and family not ready to make a decision today. They did request a follow up call on Monday if we do not hear from them sooner.  Guide and Liaison number provided.   - BEATRICE Bradshaw, BRIE Napoles, and BRIE Sierra updated.       Signed by: Dennise Corey RN  [phone]

## 2024-02-24 NOTE — PROGRESS NOTES
Daphne Morris is a 68 y.o. female on day 5 of admission presenting with Heart failure (CMS/HCC).      Subjective   Seen and examined, no new complaints.  No overnight events.  The plan discussed with the patient and RN.    The patient family met with hospice today, they did not make a decision on hospice.  I discussed the current clinical status, prognosis, hospice versus continue active plan with aggressive measures including hemodialysis.  After the discussion the patient nephew and the patient herself decided to learn more about hemodialysis and likely pursuing hemodialysis and try it.  We contacted nephrology to discuss hemodialysis with the patient family.       Objective     Last Recorded Vitals  /76   Pulse 61   Temp 36.7 °C (98.1 °F)   Resp 22   Wt 98.5 kg (217 lb 2.5 oz)   SpO2 94%   Intake/Output last 3 Shifts:    Intake/Output Summary (Last 24 hours) at 2/24/2024 1456  Last data filed at 2/24/2024 1401  Gross per 24 hour   Intake 540 ml   Output 350 ml   Net 190 ml         Admission Weight  Weight: 95.4 kg (210 lb 5.1 oz) (02/19/24 0438)    Daily Weight  02/22/24 : 98.5 kg (217 lb 2.5 oz)    Image Results  XR chest 1 view  Narrative: Interpreted By:  Mario Sampson,   STUDY:  XR CHEST 1 VIEW;  2/21/2024 11:37 am      INDICATION:  Signs/Symptoms:SOB.      COMPARISON:  02/18/2024 AP and 02/13/2024 AP and lateral chest x-rays and the  02/01/2024 unenhanced thoracic CT.      ACCESSION NUMBER(S):  Multilevel spinal degenerative changes are noted. [id number]      ORDERING CLINICIAN:  JIM ADORNO      FINDINGS:  Portable AP upright chest x-ray 02/21/2024:              CARDIOMEDIASTINAL SILHOUETTE:  The heart seems enlarged, but its size is likely to be exaggerated by  the AP lordotic projection. Vascular calcification and a left atrial  appendage Watchman device are noted. Mild coronary artery  calcification demonstrated on CT can not be appreciated.      LUNGS:  A large right pleural  effusion demonstrated on the above studies  markedly limits evaluation of the right lung. New left retrocardiac  opacity suggests lower lobe infiltrate or atelectasis as the left  lateral costophrenic angle is sharp. Pulmonary vascular cephalization  suggests congestive cardiac failure, fluid overload or uremia. Refer  to the 02/01/2024 CT report for further details.      ABDOMEN:  No remarkable upper abdominal findings.      BONES:  No acute osseous changes. Mild multilevel spinal degenerative changes  are noted.      Impression: 1. Large right pleural effusion similar to recent prior studies.  2. Pulmonary venous hypertension.  3. Cardiomegaly.  4. Probable left lower lobe infiltrate or atelectasis.              MACRO:  None      Signed by: Mario Sampson 2/21/2024 11:57 AM  Dictation workstation:   [id number]      Physical Exam  Constitutional: Well developed, awake/alert/oriented x3, no distress, alert and cooperative  Eyes: PERRL, EOMI, clear sclera  ENMT: mucous membranes moist, no apparent injury, no lesions seen  Head/Neck: Neck supple, no apparent injury, thyroid without mass or tenderness, No JVD, trachea midline, no bruits  Respiratory/Thorax: Patent airways, CTAB, normal breath sounds with good chest expansion, thorax symmetric  Cardiovascular: Regular, rate and rhythm, no murmurs, 2+ equal pulses of the extremities, normal S 1and S 2  Gastrointestinal: Nondistended, soft, non-tender, no rebound tenderness or guarding, no masses palpable, no organomegaly, +BS, no bruits  Musculoskeletal: ROM intact, no joint swelling, normal strength  Extremities: normal extremities, no cyanosis edema, contusions or wounds, no clubbing  Neurological: alert and oriented x3, intact senses, motor, response and reflexes, normal strength  Lymphatic: No significant lymphadenopathy  Psychological: Appropriate mood and behavior  Skin: Warm and dry, no lesions, no rashes  Relevant Results      Scheduled  medications  amiodarone, 200 mg, oral, Daily with breakfast  amLODIPine, 5 mg, oral, Daily  aspirin, 81 mg, oral, Daily  atorvastatin, 40 mg, oral, Daily  clopidogrel, 75 mg, oral, Daily  ferrous gluconate, 324 mg, oral, Daily with breakfast  [Held by provider] furosemide, 80 mg, oral, BID  [Held by provider] heparin (porcine), 5,000 Units, subcutaneous, q8h  [Held by provider] insulin glargine, 14 Units, subcutaneous, Nightly  insulin lispro, 0-15 Units, subcutaneous, TID with meals  ipratropium-albuteroL, 3 mL, nebulization, TID  levothyroxine, 200 mcg, oral, Daily before breakfast  lidocaine, 5 mL, infiltration, Once  melatonin, 5 mg, oral, Daily  metoprolol succinate XL, 50 mg, oral, BID  pantoprazole, 40 mg, oral, BID  polyethylene glycol, 17 g, oral, Daily  potassium chloride CR, 20 mEq, oral, Daily  potassium chloride CR, 40 mEq, oral, Daily  predniSONE, 40 mg, oral, Daily  venlafaxine XR, 75 mg, oral, Daily      Continuous medications  [Held by provider] furosemide, 10 mg/hr, Last Rate: 10 mg/hr (02/22/24 1927)      PRN medications  PRN medications: acetaminophen **OR** acetaminophen **OR** acetaminophen, acetaminophen **OR** acetaminophen **OR** acetaminophen, albuterol, dextrose 10 % in water (D10W), dextrose, diphenhydrAMINE, glucagon, ipratropium-albuteroL, ondansetron **OR** ondansetron, oxygen, oxygen, vancomycin          Results for orders placed or performed during the hospital encounter of 02/19/24 (from the past 24 hour(s))   POCT GLUCOSE   Result Value Ref Range    POCT Glucose 167 (H) 74 - 99 mg/dL   POCT GLUCOSE   Result Value Ref Range    POCT Glucose 240 (H) 74 - 99 mg/dL   CBC and Auto Differential   Result Value Ref Range    WBC 6.2 4.4 - 11.3 x10*3/uL    nRBC 0.0 0.0 - 0.0 /100 WBCs    RBC 2.63 (L) 4.00 - 5.20 x10*6/uL    Hemoglobin 7.9 (L) 12.0 - 16.0 g/dL    Hematocrit 25.5 (L) 36.0 - 46.0 %    MCV 97 80 - 100 fL    MCH 30.0 26.0 - 34.0 pg    MCHC 31.0 (L) 32.0 - 36.0 g/dL    RDW 13.5  11.5 - 14.5 %    Platelets 325 150 - 450 x10*3/uL    Neutrophils % 82.6 40.0 - 80.0 %    Immature Granulocytes %, Automated 1.1 (H) 0.0 - 0.9 %    Lymphocytes % 10.2 13.0 - 44.0 %    Monocytes % 6.1 2.0 - 10.0 %    Eosinophils % 0.0 0.0 - 6.0 %    Basophils % 0.0 0.0 - 2.0 %    Neutrophils Absolute 5.11 1.20 - 7.70 x10*3/uL    Immature Granulocytes Absolute, Automated 0.07 0.00 - 0.70 x10*3/uL    Lymphocytes Absolute 0.63 (L) 1.20 - 4.80 x10*3/uL    Monocytes Absolute 0.38 0.10 - 1.00 x10*3/uL    Eosinophils Absolute 0.00 0.00 - 0.70 x10*3/uL    Basophils Absolute 0.00 0.00 - 0.10 x10*3/uL   Renal Function Panel   Result Value Ref Range    Glucose 251 (H) 74 - 99 mg/dL    Sodium 133 (L) 136 - 145 mmol/L    Potassium 4.9 3.5 - 5.3 mmol/L    Chloride 94 (L) 98 - 107 mmol/L    Bicarbonate 29 21 - 32 mmol/L    Anion Gap 15 10 - 20 mmol/L    Urea Nitrogen 88 (H) 6 - 23 mg/dL    Creatinine 4.09 (H) 0.50 - 1.05 mg/dL    eGFR 11 (L) >60 mL/min/1.73m*2    Calcium 7.6 (L) 8.6 - 10.3 mg/dL    Phosphorus 5.5 (H) 2.5 - 4.9 mg/dL    Albumin 2.7 (L) 3.4 - 5.0 g/dL   Magnesium   Result Value Ref Range    Magnesium 2.04 1.60 - 2.40 mg/dL   Vancomycin   Result Value Ref Range    Vancomycin 18.2 5.0 - 20.0 ug/mL   Blood Gas Venous Full Panel   Result Value Ref Range    POCT pH, Venous 7.38 7.33 - 7.43 pH    POCT pCO2, Venous 59 (H) 41 - 51 mm Hg    POCT pO2, Venous 50 (H) 35 - 45 mm Hg    POCT SO2, Venous 81 (H) 45 - 75 %    POCT Oxy Hemoglobin, Venous 79.2 (H) 45.0 - 75.0 %    POCT Hematocrit Calculated, Venous 31.0 (L) 36.0 - 46.0 %    POCT Sodium, Venous 131 (L) 136 - 145 mmol/L    POCT Potassium, Venous 5.2 3.5 - 5.3 mmol/L    POCT Chloride, Venous 96 (L) 98 - 107 mmol/L    POCT Ionized Calicum, Venous 1.11 1.10 - 1.33 mmol/L    POCT Glucose, Venous 301 (H) 74 - 99 mg/dL    POCT Lactate, Venous 0.7 0.4 - 2.0 mmol/L    POCT Base Excess, Venous 8.3 (H) -2.0 - 3.0 mmol/L    POCT HCO3 Calculated, Venous 34.9 (H) 22.0 - 26.0 mmol/L     POCT Hemoglobin, Venous 10.2 (L) 12.0 - 16.0 g/dL    POCT Anion Gap, Venous 5.0 (L) 10.0 - 25.0 mmol/L    Patient Temperature      FiO2 4 %   POCT GLUCOSE   Result Value Ref Range    POCT Glucose 255 (H) 74 - 99 mg/dL   POCT GLUCOSE   Result Value Ref Range    POCT Glucose 188 (H) 74 - 99 mg/dL      NM Lung perfusion with spect/ct    Result Date: 2/19/2024  Interpreted By:  Rolando Dillon and Osman Sena STUDY: NM LUNG PERFUSION WITH SPECT/CT;  2/19/2024 9:58 am   INDICATION: Signs/Symptoms: 68-year-old female presented with known congestive heart failure. R/o pulmoanry embolism.   COMPARISON: Chest x-ray from 02/18/2024   ACCESSION NUMBER(S): [id number]   ORDERING CLINICIAN: SARAH LEMOS   TECHNIQUE: DIVISION OF NUCLEAR MEDICINE PERFUSION LUNG SCANS   Multiple perfusion images of the lungs were acquired after the intravenous administration of 4.0 mCi of Tc-99m macroaggregated albumin (MAA). In addition,SPECT/CT of the chest was performed.   FINDINGS: Perfusion images of both lungs demonstrate mild heterogeneity throughout the lung fields bilaterally. There are no distinct segmental or subsegmental perfusion defects. Fissure sign seen at right lung compatible with right pleural effusion.       There are no segmental or subsegmental perfusion defects in both lungs indicating low probability for acute pulmonary embolism. Moderate-sized right pleural effusion.     The interpretation above is based on modified PISAPED criteria.   This study was analyzed and interpreted at [place], [place].   Signed by: Rolando Dillon 2/19/2024 10:38 AM Dictation workstation:   [id number]    ECG 12 Lead    Result Date: 2/19/2024  Junctional rhythm Left axis deviation Inferior infarct , age undetermined Anterolateral infarct (cited on or before 01-FEB-2024) Abnormal ECG When compared with ECG of 01-FEB-2024 02:46, Junctional rhythm has replaced Sinus rhythm Questionable change in initial forces of Lateral  leads    Vascular US lower extremity venous duplex bilateral    Result Date: 2/18/2024  Interpreted By:  Washington Peraza, STUDY: [facility] US LOWER EXTREMITY VENOUS DUPLEX BILATERAL  2/18/2024 8:09 pm   INDICATION: 67 y/o   F with  Signs/Symptoms:d dimer > 13,000. LMP:  Unknown.   COMPARISON: None.   ACCESSION NUMBER(S): [id number]   ORDERING CLINICIAN: RAINER BARR   TECHNIQUE: Routine ultrasound of the  bilateral lower extremity was performed with duplex Doppler (color and spectral) evaluation.   Static images were obtained for remote interpretation.   FINDINGS: THIGH VEINS:  The common femoral, femoral, popliteal, proximal medial saphenous, and deep femoral veins are patent and free of thrombus. The veins are normally compressible.  They demonstrate normal phasic flow and augmentation response.   CALF VEINS: Significant soft tissue swelling somewhat limits assessment.       Negative study.  No deep venous thrombosis of the  bilateral lower extremity.  Soft tissue swelling limits assessment   MACRO: None   Signed by: Washington Peraza 2/18/2024 8:41 PM Dictation workstation:   [id number]    XR chest 1 view    Result Date: 2/18/2024  Interpreted By:  Erlin Lui, STUDY: XR CHEST 1 VIEW;  2/18/2024 4:32 pm   INDICATION: Signs/Symptoms:Dyspnea history of CHF.   COMPARISON: CT scan from 02/01/2024.   ACCESSION NUMBER(S): [id number]   ORDERING CLINICIAN: RAINER BARR   TECHNIQUE: Single AP portable view of the chest was obtained.   FINDINGS: MEDIASTINUM/ LUNGS/ TAMMIE: Cardiomegaly. Pulmonary vasculature and interstitium are prominent and indistinct. Slight new pleural and parenchymal opacities at the lateral left lung base. Persistent pleural and parenchymal opacities in the mid and lower right hemithorax. These are grossly stable. Stable calcification in the aorta.   No pneumothorax. No tracheal deviation. No abnormal hilar fullness or gross mass on either side.   BONES: No lytic or blastic destructive bone  lesion.  There is mild-to-moderate disc space narrowing and endplate osteophytosis throughout the thoracic spine.   UPPER ABDOMEN: Grossly intact.       Findings of ongoing CHF.   Grossly stable pleural and parenchymal opacities in the mid and lower right hemithorax. Mild new left pleural effusion with left basilar atelectasis or edema.   MACRO: None   Signed by: Erlin Lui 2/18/2024 4:59 PM Dictation workstation:   [id number]    XR chest 2 views    Result Date: 2/14/2024  Interpreted By:  Amando Starks, STUDY: XR CHEST 2 VIEWS; 2/13/2024 8:25 am   INDICATION: Signs/Symptoms:pleural effusion   COMPARISON: Radiographs 02/09/2024   ACCESSION NUMBER(S): [id number]   ORDERING CLINICIAN: TONIA MONTANA   TECHNIQUE: Frontal and lateral radiographs of the chest were obtained.   FINDINGS: LINES AND DEVICES: None.   LUNGS: Stable moderate to large right pleural effusion with adjacent atelectasis. No new focal consolidation, left pleural effusion or pneumothorax.   CARDIOMEDIASTINAL SILHOUETTE: Partially obscured right heart border by adjacent effusion.   OTHER: No acute osseous abnormality.       Stable moderate to large right pleural effusion.   MACRO None   Signed by: Amando Starks 2/14/2024 9:50 AM Dictation workstation:   [id number]    XR chest 1 view    Result Date: 2/9/2024  Interpreted By:  Sukumar Daugherty, STUDY: XR CHEST 1 VIEW;  2/9/2024 1:13 pm   INDICATION: Signs/Symptoms:fever.   COMPARISON: 02/02/2024   ACCESSION NUMBER(S): [id number]   ORDERING CLINICIAN: TONIA MONTANA   FINDINGS: Significantly increased large right pleural effusion with overlying atelectasis. Left lung grossly clear. Cardiomediastinal silhouette unchanged. Aortic atherosclerosis. Pulmonary vascular congestion.       Large right pleural effusion, significantly increased compared to 1 week ago.     Signed by: Sukumar Daugherty 2/9/2024 3:56 PM Dictation workstation:   [id number]    Cardiac catheterization - non-coronary    Result Date:  2/7/2024   [facility], Cath Lab, [address] [address] Cardiovascular Catheterization Report Patient Name:      MOE MAYA      Performing Physician:  [id number] Nicholas Antonio MD Study Date:        2/7/2024             Verifying Physician:   [id number] Nicholas Antonio MD MRN/PID:           [id number]             Cardiologist/Co-scrub: Accession#:        [id number]         Ordering Physician:    [id number] DOMONIQUE PURCELL Date of Birth/Age: 1955 / 68 years Fellow: Gender:            F                    Fellow: Encounter#:        [id number]  Study: Right Heart Cath  Indications: MOE MAYA is a 69 year old female who presents with dyslipidemia, hypertension, renal failure, atrial fibrillation, diabetes, peripheral artery disease, chronic pulmonary disease, obesity and Tobacco Use - Current. Heart failure. Pulmonary hypertension and heart failure.  Procedure Description: After infiltration of local anesthetic, the right internal jugular vein was identified with two-dimensional ultrasound. Under direct ultrasound visualization, the right internal jugular vein was cannulated with a micropuncture technique. A 7 Australian sheath was placed in the vein. A balloon tipped catheter was advanced through the right heart to record pressures. Cardiac output was calculated via the Steph method.  Right Heart Catheterization: A balloon tipped catheter was advanced through the right heart to record pressures. Cardiac output was calculated via the Steph method. Elevated left sided filling pressures with normal cardiac output. Cardiac output is normal. Preserved cardiac output at rest. No evidence of shunt. Elevated pulmonary vascular resistance. Elevated systemic vascular resistance. Pulmonary venous  hypertension and pulmonary arterial hypertension.  Hemo Personnel: +-------------------------+---------+ Name                     Duty      +-------------------------+---------+ Nicholas Antonio MD 1 +-------------------------+---------+  +----------+ Contrast:  +----------+ Omnipaque: +----------+  Hemodynamic Pressures:  +----+---------+----------+------------+-------------+---+-----+-------+-------+ SiteDate TimePhase Name  Systolic    Diastolic  ED Mean A-Wave V-Wave                             mmHg        mmHg     mmHmmHg  mmHg   mmHg                                                    g                     +----+---------+----------+------------+-------------+---+-----+-------+-------+   RA 2/7/2024  AIR REST                                9     13     11       2:16:48                                                                     PM                                                          +----+---------+----------+------------+-------------+---+-----+-------+-------+   RV 2/7/2024  AIR REST          56            3 14                          2:17:18                                                                     PM                                                          +----+---------+----------+------------+-------------+---+-----+-------+-------+   PA 2/7/2024  AIR REST          55           15      30                     2:21:53                                                                     PM                                                          +----+---------+----------+------------+-------------+---+-----+-------+-------+   PW 2/7/2024  AIR REST                               23     20     35       2:23:01                                                                     PM                                                           +----+---------+----------+------------+-------------+---+-----+-------+-------+   PW 2/7/2024  AIR REST                               23     20     38       2:23:28                                                                     PM                                                          +----+---------+----------+------------+-------------+---+-----+-------+-------+   PA 2/7/2024  AIR REST          57           15      33                     2:24:12                                                                     PM                                                          +----+---------+----------+------------+-------------+---+-----+-------+-------+   AO 2/7/2024  AIR REST         176           62      -2                     2:28:01                                                                     PM                                                          +----+---------+----------+------------+-------------+---+-----+-------+-------+  Oxygen Saturation %: +-----------+----------+------------+ Sample SiteO2 Sat (%)HB (g/100ml) +-----------+----------+------------+          FA        94         8.6 +-----------+----------+------------+          RA        64         8.6 +-----------+----------+------------+          FA        94         8.6 +-----------+----------+------------+          PA        60         8.6 +-----------+----------+------------+  Cardiac Outputs: +---------------+------------------+-------+ ISABELLE CO (l/min)ISABELLE CI (l/min/m2)ISABELLE SV +---------------+------------------+-------+             8.0               3.8  138.5 +---------------+------------------+-------+  Vascular Resistance Calculated Values (Wood Units): +-----+---+----+---+----+ PhasePVRPVRITPRTPRI +-----+---+----+---+----+ 0    1.02.1 4.29.0  +-----+---+----+---+----+   Complications: No in-lab complications observed.  Cardiac Cath Post Procedure Notes: Post Procedure           Moderately elevated PCWP, normal cardiac output, no Diagnosis:               shunts. Blood Loss:              Estimated blood loss during the procedure was 0 mls. Specimens Removed:       Number of specimen(s) removed: none.  Recommendations: Maximize medical therapy. Agressive risk factor modification efforts. Optimize volume status with diuretics. Renal replacement therapy. Patient counseled and advised to discontinue smoking. ____________________________________________________________________________________ CONCLUSIONS:  1. RHC shows moderate pulmonary HTN, mPAP 30-33 mm Hg, mRA 9 mm Hg, normal cardiac output and no shunts.  2. Moderately elevated PCWP. ICD 10 Codes: Acute diastolic (congestive) heart failure (CHF)-I50.31  CPT Codes: Right Heart Cath O2/Cardiac output without biopsy (RHC)-96021; Moderate Sedation Services 1st additional 15 minutes patient >5 years-90073; [place]-13513  [id number] Nicholas Antonio MD Performing Physician Electronically signed by [id number] Nicholas Antonio MD on 2/7/2024 at 5:02:34 PM  ** Final **     Transthoracic Echo (TTE) Complete    Result Date: 2/5/2024   [facility], [address] [place] [address]               Tel [phone] and Fax [phone] TRANSTHORACIC ECHOCARDIOGRAM REPORT  Patient Name:      MOE MAYA      Reading Physician:    [id number] Antonio MD Study Date:        2/5/2024             Ordering Provider:    [id number] DOMONIQUE PURCELL MRN/PID:           [id number]             Fellow: Accession#:        [id number]         Nurse: Date of Birth/Age: 1955 / 68 years Sonographer:          Nina Angel                                                               [facility] Gender:            F                     Additional Staff: Height:            167.64 cm            Admit Date:           2/2/2024 Weight:            102.51 kg            Admission Status:     Inpatient -                                                               Routine BSA:               2.11 m2              Encounter#:           [id number]                                         Department Location:  [facility] Non                                                               Invasive Blood Pressure: 147 /65 mmHg Study Type:    TRANSTHORACIC ECHO (TTE) COMPLETE Diagnosis/ICD: Acute combined systolic (congestive) and diastolic (congestive)                heart failure (CHF)-I50.41 Indication:    Congestive Heart Failure CPT Code:      Echo Complete w Full Doppler-04338 Patient History: Diabetes:         Yes Pertinent         A-Fib, HTN, Dyspnea and LE Edema. Watchman device, elevated History:          BNP,. Study Detail: The following Echo studies were performed: 2D, M-Mode, Doppler and               color flow.  PHYSICIAN INTERPRETATION: Left Ventricle: The left ventricular systolic function is normal, with an estimated ejection fraction of 60-65%. There are no regional wall motion abnormalities. The left ventricular cavity size is normal. Spectral Doppler shows an impaired relaxation pattern of left ventricular diastolic filling. Left Atrium: The left atrium is moderately dilated. Right Ventricle: The right ventricle is mildly enlarged. There is mildly reduced right ventricular systolic function. Right Atrium: The right atrium is moderately dilated. Aortic Valve: The aortic valve is trileaflet. There is mild aortic valve cusp calcification. There is trivial aortic valve regurgitation. The peak instantaneous gradient of the aortic valve is 14.1 mmHg. The mean gradient of the aortic valve is 9.1 mmHg. Mitral Valve: The mitral valve is normal in structure. There is mild mitral annular calcification. There is mild to moderate mitral  valve regurgitation. Tricuspid Valve: The tricuspid valve is structurally normal. There is moderate tricuspid regurgitation. Pulmonic Valve: The pulmonic valve is structurally normal. There is mild pulmonic valve regurgitation. Pericardium: There is a small to moderate pericardial effusion posterior to the left ventricle. There is no evidence of cardiac tamponade. Aorta: The aortic root is normal. Pulmonary Artery: The tricuspid regurgitant velocity is 3.04 m/s, and with an estimated right atrial pressure of 3 mmHg, the estimated pulmonary artery pressure is mildly elevated with the RVSP at 39.9 mmHg. Pulmonary Veins: The pulmonary veins appear normal and return normally to the left atrium. Systemic Veins: The inferior vena cava appears to be of normal size. There is IVC inspiratory collapse greater than 50%.  CONCLUSIONS:  1. Left ventricular systolic function is normal with a 60-65% estimated ejection fraction.  2. Spectral Doppler shows an impaired relaxation pattern of left ventricular diastolic filling.  3. There is mildly reduced right ventricular systolic function.  4. The left atrium is moderately dilated.  5. The right atrium is moderately dilated.  6. There is a small to moderate pericardial effusion.  7. There is no evidence of cardiac tamponade.  8. Mild to moderate mitral valve regurgitation.  9. Moderate tricuspid regurgitation visualized. 10. Normal CVP. Estimated PASP around 45 mm Hg. QUANTITATIVE DATA SUMMARY: 2D MEASUREMENTS:                           Normal Ranges: IVSd:          1.52 cm    (0.6-1.1cm) LVPWd:         1.42 cm    (0.6-1.1cm) LVIDd:         4.35 cm    (3.9-5.9cm) LVIDs:         2.94 cm LV Mass Index: 120.7 g/m2 LV % FS        32.3 % LA VOLUME:                              Normal Ranges: LA Vol A4C:       79.5 ml    (22+/-6mL/m2) LA Vol A2C:       78.2 ml LA Vol BP:        79.7 ml LA Vol Index A4C: 37.7ml/m2 LA Vol Index A2C: 37.1 ml/m2 LA Vol Index BP:  37.9 ml/m2 LA Volume Index:   37.8 ml/m2 LA Vol A4C:       76.0 ml LA Vol A2C:       73.9 ml RA VOLUME BY A/L METHOD:                       Normal Ranges: RA Area A4C: 17.8 cm2 M-MODE MEASUREMENTS:                  Normal Ranges: Ao Root: 3.00 cm (2.0-3.7cm) LAs:     5.08 cm (2.7-4.0cm) LV SYSTOLIC FUNCTION BY 2D PLANIMETRY (MOD):                     Normal Ranges: EF-A4C View: 63.4 % (>=55%) EF-A2C View: 63.8 % EF-Biplane:  62.5 % LV DIASTOLIC FUNCTION:                             Normal Ranges: MV Peak E:      1.33 m/s    (0.7-1.2 m/s) MV Peak A:      0.53 m/s    (0.42-0.7 m/s) E/A Ratio:      2.52        (1.0-2.2) MV e'           0.06 m/s    (>8.0) MV lateral e'   0.06 m/s MV medial e'    0.06 m/s MV A Dur:       110.73 msec E/e' Ratio:     22.09       (<8.0) PulmV Sys Mike:  53.97 cm/s PulmV Dugan Mike: 77.63 cm/s PulmV S/D Mike:  0.70 MITRAL VALVE:                 Normal Ranges: MV DT: 245 msec (150-240msec) MITRAL INSUFFICIENCY:                         Normal Ranges: MR VTI:     171.38 cm MR Vmax:    489.73 cm/s MR Volume:  20.07 ml MR Flow Rt: 57.36 ml/s MR EROA:    0.12 cm2 AORTIC VALVE:                                    Normal Ranges: AoV Vmax:                1.88 m/s  (<=1.7m/s) AoV Peak P.1 mmHg (<20mmHg) AoV Mean P.1 mmHg  (1.7-11.5mmHg) LVOT Max Mike:            0.97 m/s  (<=1.1m/s) AoV VTI:                 46.22 cm  (18-25cm) LVOT VTI:                24.89 cm LVOT Diameter:           1.96 cm   (1.8-2.4cm) AoV Area, VTI:           1.62 cm2  (2.5-5.5cm2) AoV Area,Vmax:           1.56 cm2  (2.5-4.5cm2) AoV Dimensionless Index: 0.54  RIGHT VENTRICLE: RV Basal 3.80 cm RV Mid   2.80 cm RV Major 8.0 cm TAPSE:   18.0 mm RV s'    0.13 m/s TRICUSPID VALVE/RVSP:                             Normal Ranges: Peak TR Velocity: 3.04 m/s RV Syst Pressure: 39.9 mmHg (< 30mmHg) PULMONIC VALVE:                      Normal Ranges: PV Max Mike: 1.0 m/s  (0.6-0.9m/s) PV Max PG:  3.9 mmHg Pulmonary Veins: PulmV Dugan Mike: 77.63  cm/s PulmV S/D Mike:  0.70 PulmV Sys Mike:  53.97 cm/s  [id number] Nicholas Antonio MD Electronically signed on 2/5/2024 at 5:06:05 PM  ** Final **     XR knee left 4+ views    Result Date: 2/2/2024  Interpreted By:  Adama Jonas, STUDY: Left knee dated  2/2/2024.   INDICATION: Signs/Symptoms:left kne pain   COMPARISON: Radiographs dated 10/23/2018.   ACCESSION NUMBER(S): [id number]   ORDERING CLINICIAN: JOSUÉ REICH   TECHNIQUE: Four views of the left knee.   FINDINGS: No fracture or dislocation is evident.  There is a small knee joint effusion. There is moderate tricompartmental degenerative change of the knee. Reticular increased density is seen in the subcutaneous tissues.       1. Small joint effusion and degenerative change of the knee without osseous injury evident. Knowledge of any trauma may be helpful. If there is persistent concern for osseous injury, cross-sectional imaging can be considered. 2. Reticular increased density in the subcutaneous tissues which may represent lymphedema and/or cellulitis.   MACRO: None   Signed by: Adama Jonas 2/2/2024 4:24 PM Dictation workstation:   [id number]    XR chest 1 view    Result Date: 2/2/2024  Interpreted By:  Rayna Carson, STUDY: XR CHEST 1 VIEW;  2/2/2024 2:29 pm   INDICATION: Signs/Symptoms:S/p thoracentesis.   COMPARISON: 02/01/2024   ACCESSION NUMBER(S): [id number]   ORDERING CLINICIAN: AIDAN HERNANDEZ   TECHNIQUE: Portable AP upright   FINDINGS: The cardiac silhouette is enlarged but unchanged. Aorta is atherosclerotic. There is marked improvement in the right pleural effusion since the prior study as a result of interval thoracentesis. No pneumothorax is present. There is minimal fluid residual blunting the costophrenic angle. There is minimal atelectasis at the right lung base. Pulmonary vasculature appears congested. No interval change is noted in the osseous structures.       Marked interval improvement in the right pleural effusion with  interval thoracentesis. No pneumothorax.   Pulmonary vascular congestion   Minimal atelectasis right lung base   MACRO: None.   Signed by: Rayna Carson 2/2/2024 3:23 PM Dictation workstation:   [id number]    US renal complete    Result Date: 2/2/2024  Interpreted By:  Amando Starks, STUDY: US RENAL COMPLETE; 2/2/2024 12:23 pm   INDICATION: Signs/Symptoms:ANDREA on CKD.   COMPARISON: Renal ultrasound 05/01/2023   ACCESSION NUMBER(S): [id number]   ORDERING CLINICIAN: JESSICA EPPS   TECHNIQUE: Sonography of the kidneys and urinary bladder was performed.   FINDINGS: Right Kidney: Right total nephrectomy.   Left Kidney: *Renal length: 10.6 cm *Parenchyma: Normal parenchymal echogenicity. Normal parenchymal thickness. *Collecting system: No hydronephrosis. *Calculus: No echogenic, shadowing calculus. *Lesion: None.   Bladder: Normal sonographic appearance.       No left hydronephrosis. Right total nephrectomy.   MACRO: None   Signed by: Amando Starks 2/2/2024 2:36 PM Dictation workstation:   [id number]    ECG 12 lead    Result Date: 2/1/2024  Sinus bradycardia Left axis deviation Low voltage QRS Possible Anterolateral infarct , age undetermined Abnormal ECG When compared with ECG of 19-JUL-2023 14:12, Previous ECG has undetermined rhythm, needs review Left bundle branch block is no longer Present Borderline criteria for Anterior infarct are now Present Borderline criteria for Anterolateral infarct are now Present See ED provider note for full interpretation and clinical correlation Confirmed by Wanda Juan ([id number]) on 2/1/2024 10:32:50 AM    CT chest wo IV contrast    Result Date: 2/1/2024  Interpreted By:  Beulah Chacon, STUDY: CT CHEST WO IV CONTRAST;  2/1/2024 6:40 am   INDICATION: Signs/Symptoms:sob   COMPARISON: Chest x-ray 02/01/2024. CT chest 05/04/2021   ACCESSION NUMBER(S): [id number]   ORDERING CLINICIAN: BHARGAV SWEET   TECHNIQUE: Axial CT images were obtained through the chest with no  intravenous contrast administration.  Coronal and sagittal reformats were performed.   FINDINGS: There is motion artifact.   HEART AND VESSELS: No thoracic aortic aneurysm. Atherosclerotic calcifications are noted at the thoracic aorta. The main pulmonary artery is dilated. The heart is enlarged.   There is a small pericardial effusion. Status post left atrial appendage occlusion device placement.   MEDIASTINUM AND TAMMIE, LOWER NECK AND AXILLA: There is a 1.1 cm peripherally calcified nodule at the left lobe of the thyroid gland.   No obvious pathologically enlarged lymph nodes on unenhanced CT.   LUNGS AND AIRWAYS: The trachea and central airways are patent.   The evaluation of the lungs is degraded by motion artifact. There is a large right pleural effusion with complete collapse of the right lower lobe. Atelectasis/consolidation at right upper lobe and right middle lobe. Tree-in-bud airspace opacities at the right upper lobe and left lower lobe. There is mild atelectasis at the left lower lobe. No left pleural effusion. No pneumothorax.   UPPER ABDOMEN: Motion artifact. No obvious acute findings.   CHEST WALL AND OSSEOUS STRUCTURES: There is soft tissue edema at the chest wall and visualized abdominal wall. The evaluation for acute fracture is degraded by motion artifact. Multilevel degenerative changes of the spine.       1. Study degraded by motion artifact. Large right pleural effusion with complete collapse of the right lower lobe. Atelectasis/consolidation at the right upper lobe and right middle lobe. Follow-up CT after treatment recommended to ensure complete resolution and exclude underlying mass. 2. Tree-in-bud airspace opacities at the right upper lobe and left lower lobe, may be seen with acute infection/inflammation of the smaller airways such as bronchiolitis or bronchopneumonia. 3. Mild atelectasis at the left lower lobe. 4. Cardiomegaly. Small pericardial effusion. Dilated main pulmonary artery,  please correlate for pulmonary arterial hypertension. 5. 1.1 cm peripherally calcified nodule at the left lobe of the thyroid gland. This can be further characterized with nonemergent thyroid ultrasound. 6. Soft tissue edema at the chest wall and visualized abdominal wall.         MACRO:   Critical Finding:  See findings. Notification was initiated on 2/1/2024 at 6:50 am by  Beulah Chacon.  (**-YCF-**) Instructions:   Signed by: Beulah Chacon 2/1/2024 6:56 AM Dictation workstation:   [id number]    XR chest 1 view    Result Date: 2/1/2024  Interpreted By:  Lesley Dooley, STUDY: XR CHEST 1 VIEW;  2/1/2024 3:03 am   INDICATION: Signs/Symptoms:sob.   COMPARISON: 07/19/2023   ACCESSION NUMBER(S): [id number]   ORDERING CLINICIAN: BHARGAV SWEET   FINDINGS:     CARDIOMEDIASTINAL SILHOUETTE: Stable cardiomegaly. Vague density overlying the upper cardiac silhouette may represent a left atrial appendage closure device.   LUNGS: Opacity involving the mid to lower right thorax, likely combination of large right pleural effusion and atelectasis. No pneumothorax.   ABDOMEN: No remarkable upper abdominal findings.   BONES: No acute osseous abnormality.       New large right pleural effusion and basilar atelectasis. Underlying pneumonia is not excluded in the appropriate clinical setting.   MACRO: None   Signed by: Lesley Dooley 2/1/2024 3:19 AM Dictation workstation:   [id number]      Assessment/Plan        Principal Problem:    Heart failure (CMS/HCC)      Daphne Morris is a 68 y.o. female presenting with past medical history of heart failure, atrial fibrillation, Hypertension, type 2 DM transfer from [facility] ER due to acute hypoxic respiratory failure secondary to pneumonia, COPD exacerbation and acute diastolic heart failure.     #Acute hypoxic respiratory failure secondary to acute on chronic diastolic heart failure and influenza B pneumonia  -Seen and examined  -Hemodynamically stable satting well on HFNC  -Labs  showed hemoglobin of 6.4>>7.4, serum creatinine of 3.2>> 3.5  -Echocardiogram 2/05/24/revealed LVEF of 60-65%, impaired relaxation, mod dilated atria  -Right heart cath February 7, 2024 revealed moderate pulmonary hypertension, mPAP of 30 to 33 mmHg, mRA 9 mmHg, normal cardiac output, moderately elevated PCWP  -BNP 1444  --Chest x-ray with stable pleural and parenchymal opacities and new left pleural effusion with left basilar atelectasis or edema  Continue BiPAP 22/5m wean O2 as tolerated /  on O2 at 2l/min at baseline  We will start Lasix drip and cardiac diet with 1.5 L fluid restriction  Continue renally dosed Tamiflu  Strict I's and O's and cardiac diet  -Cardiology on board, unable to do left heart cath due to elevated creatinine, unable to start SGLT2 inhibitors as well  -Pulmonary on board plan to continue prednisone, breathing treatment, patient finished Tamiflu  -Nephrology on board a Lasix drip on hold, family considering dialysis      #Stage III/IV chronic kidney disease  Status post right nephrectomy  Renal ultrasound negative  Renal function seems to be near baseline  Nephrology on board, family are still thinking about hemodialysis    #Anemia  -Status post 1 unit of blood   -FOBT positive  -A iron saturation of 11 iron level of 24 ferritin in 200  -Bleeding precautions  -Will consider iron supplements on discharge  -Will consider GI consult    #D-Dimer 13,011  Possibly related to flu  Ultrasound ruled out DVT  VQ scan low probability of PE     #Lower extremity cellulitis  To diuresis, leg elevation  Continue vancomycin     #Elevated troponin  Suspect demand ischemia  EKG with no significant changes from prior EKG  Continue aspirin, Plavix, statin, metoprolol  Cardiology following         COPD  Does not appear to be in acute exacerbation  Continue Larisa       Paroxysmal atrial fibrillation  Status post watchman in August 2023  Currently in sinus rhythm  Continue amiodarone and metoprolol     Type 2  diabetes mellitus  Continue insulin sliding scale  Holding Lantus due to limited oral intake  Holding Januvia  Continue diabetic diet  Follow Accu-Cheks     Hypertension  Cardiac diet  Neurovascular metoprolol continued  Monitor vitals     GERD  Continue PPI     Hyperlipidemia  Continue atorvastatin     Depression  Continue venlafaxine     Hypothyroidism  Continue Synthroid     DVT prophylaxis  Heparin             Disposition  -Hospice meeting happened today; patient's family still not ready to decide considering hemodialysis       Myke Newsome MD

## 2024-02-24 NOTE — PROGRESS NOTES
"Vancomycin Dosing by Pharmacy- FOLLOW UP    Daphne Morris is a 68 y.o. year old female who Pharmacy has been consulted for vancomycin dosing for cellulitis, skin and soft tissue. Based on the patient's indication and renal status this patient is being dosed based on a goal trough/random level of 10-15.     Renal function is currently declining.    Current vancomycin dose: dose by level    Most recent random level: 18.2 mcg/mL    Visit Vitals  /80 (BP Location: Right arm, Patient Position: Lying)   Pulse 67   Temp 37 °C (98.6 °F) (Temporal)   Resp 21        Lab Results   Component Value Date    CREATININE 4.09 (H) 02/24/2024    CREATININE 3.93 (H) 02/23/2024    CREATININE 3.75 (H) 02/22/2024    CREATININE 3.51 (H) 02/21/2024        Patient weight is No results found for: \"PTWEIGHT\"    No results found for: \"CULTURE\"     I/O last 3 completed shifts:  In: 3873.4 (39.3 mL/kg) [P.O.:780; I.V.:8 (0.1 mL/kg); Blood:3085.4]  Out: 460 (4.7 mL/kg) [Urine:460 (0.1 mL/kg/hr)]  Weight: 98.5 kg   [unfilled]    Lab Results   Component Value Date    PATIENTTEMP  02/24/2024      Comment:      NOTE: Patient Results are Not Corrected for Temperature    PATIENTTEMP  02/22/2024      Comment:      NOTE: Patient Results are Not Corrected for Temperature    PATIENTTEMP  02/21/2024      Comment:      NOTE: Patient Results are Not Corrected for Temperature        Assessment/Plan    Random level 18.2 above recommended 10-15 level.  Level decreased from 20.4 the day before.    The next level will be obtained on 2/25 at 5a. May be obtained sooner if clinically indicated.   Will continue to monitor renal function daily while on vancomycin and order serum creatinine at least every 48 hours if not already ordered.  Follow for continued vancomycin needs, clinical response, and signs/symptoms of toxicity.       Brian Justice, PharmD           "

## 2024-02-24 NOTE — PROGRESS NOTES
No new issues. ANDREA worse today. Minimal UOP. Lasix off. Hospice meeting yesterday.     I did not speak to have of her family members. I reviewed with her POA yesterday and encouraged them to consider comnfort care as prognosis is poor.

## 2024-02-24 NOTE — CARE PLAN
Problem: Respiratory  Goal: Clear secretions with interventions this shift  Outcome: Progressing  Goal: No signs of respiratory distress (eg. Use of accessory muscles. Peds grunting)  Outcome: Progressing      The clinical goals for the shift include patient will deny shortness of breath this shift    Over the shift, the patient did not make progress toward the following goals. Barriers to progression include patient can be forgetful and anxious . Recommendations to address these barriers include reiteration of care and family involvement .

## 2024-02-24 NOTE — CARE PLAN
The patient's goals for the shift include      The clinical goals for the shift include Pt will rmain above 92% this shift    Over the shift, the patient did not make progress toward the following goals. Barriers to progression include the patient. Recommendations to address these barriers include educating patient on respiratory demand .

## 2024-02-24 NOTE — PROGRESS NOTES
No new issues. ANDREA worse today. Symptomatically she is about same.     After further discussion about reviewing the risks and benefits of HD she wants to proceed. D/w nephew and SANTHOSH Sheppard    ANDREA worse  ADHF not responding to treatment    D/w Dr Newsome    Plan  for TDC on Mon and HD. This will be permanent HD so will need chair in community

## 2024-02-25 LAB
ALBUMIN SERPL BCP-MCNC: 2.9 G/DL (ref 3.4–5)
ANION GAP SERPL CALC-SCNC: 18 MMOL/L (ref 10–20)
BASOPHILS # BLD AUTO: 0.01 X10*3/UL (ref 0–0.1)
BASOPHILS NFR BLD AUTO: 0.1 %
BUN SERPL-MCNC: 102 MG/DL (ref 6–23)
CALCIUM SERPL-MCNC: 7.8 MG/DL (ref 8.6–10.3)
CHLORIDE SERPL-SCNC: 94 MMOL/L (ref 98–107)
CO2 SERPL-SCNC: 30 MMOL/L (ref 21–32)
CREAT SERPL-MCNC: 4.08 MG/DL (ref 0.5–1.05)
EGFRCR SERPLBLD CKD-EPI 2021: 11 ML/MIN/1.73M*2
EOSINOPHIL # BLD AUTO: 0 X10*3/UL (ref 0–0.7)
EOSINOPHIL NFR BLD AUTO: 0 %
ERYTHROCYTE [DISTWIDTH] IN BLOOD BY AUTOMATED COUNT: 13.5 % (ref 11.5–14.5)
GLUCOSE BLD MANUAL STRIP-MCNC: 182 MG/DL (ref 74–99)
GLUCOSE BLD MANUAL STRIP-MCNC: 187 MG/DL (ref 74–99)
GLUCOSE BLD MANUAL STRIP-MCNC: 200 MG/DL (ref 74–99)
GLUCOSE BLD MANUAL STRIP-MCNC: 225 MG/DL (ref 74–99)
GLUCOSE SERPL-MCNC: 196 MG/DL (ref 74–99)
HCT VFR BLD AUTO: 26.1 % (ref 36–46)
HGB BLD-MCNC: 7.9 G/DL (ref 12–16)
IMM GRANULOCYTES # BLD AUTO: 0.1 X10*3/UL (ref 0–0.7)
IMM GRANULOCYTES NFR BLD AUTO: 1.3 % (ref 0–0.9)
LYMPHOCYTES # BLD AUTO: 0.68 X10*3/UL (ref 1.2–4.8)
LYMPHOCYTES NFR BLD AUTO: 8.9 %
MAGNESIUM SERPL-MCNC: 2.13 MG/DL (ref 1.6–2.4)
MCH RBC QN AUTO: 29.5 PG (ref 26–34)
MCHC RBC AUTO-ENTMCNC: 30.3 G/DL (ref 32–36)
MCV RBC AUTO: 97 FL (ref 80–100)
MONOCYTES # BLD AUTO: 0.53 X10*3/UL (ref 0.1–1)
MONOCYTES NFR BLD AUTO: 6.9 %
NEUTROPHILS # BLD AUTO: 6.32 X10*3/UL (ref 1.2–7.7)
NEUTROPHILS NFR BLD AUTO: 82.8 %
NRBC BLD-RTO: 0 /100 WBCS (ref 0–0)
PHOSPHATE SERPL-MCNC: 5.5 MG/DL (ref 2.5–4.9)
PLATELET # BLD AUTO: 349 X10*3/UL (ref 150–450)
POTASSIUM SERPL-SCNC: 5.2 MMOL/L (ref 3.5–5.3)
RBC # BLD AUTO: 2.68 X10*6/UL (ref 4–5.2)
SODIUM SERPL-SCNC: 137 MMOL/L (ref 136–145)
VANCOMYCIN SERPL-MCNC: 17.4 UG/ML (ref 5–20)
WBC # BLD AUTO: 7.6 X10*3/UL (ref 4.4–11.3)

## 2024-02-25 PROCEDURE — 2500000002 HC RX 250 W HCPCS SELF ADMINISTERED DRUGS (ALT 637 FOR MEDICARE OP, ALT 636 FOR OP/ED)

## 2024-02-25 PROCEDURE — 80202 ASSAY OF VANCOMYCIN: CPT | Performed by: INTERNAL MEDICINE

## 2024-02-25 PROCEDURE — 99232 SBSQ HOSP IP/OBS MODERATE 35: CPT | Performed by: INTERNAL MEDICINE

## 2024-02-25 PROCEDURE — 99233 SBSQ HOSP IP/OBS HIGH 50: CPT | Performed by: INTERNAL MEDICINE

## 2024-02-25 PROCEDURE — 84100 ASSAY OF PHOSPHORUS: CPT | Performed by: INTERNAL MEDICINE

## 2024-02-25 PROCEDURE — 2500000004 HC RX 250 GENERAL PHARMACY W/ HCPCS (ALT 636 FOR OP/ED)

## 2024-02-25 PROCEDURE — 94640 AIRWAY INHALATION TREATMENT: CPT

## 2024-02-25 PROCEDURE — 1200000002 HC GENERAL ROOM WITH TELEMETRY DAILY

## 2024-02-25 PROCEDURE — 94668 MNPJ CHEST WALL SBSQ: CPT

## 2024-02-25 PROCEDURE — 2500000002 HC RX 250 W HCPCS SELF ADMINISTERED DRUGS (ALT 637 FOR MEDICARE OP, ALT 636 FOR OP/ED): Performed by: FAMILY MEDICINE

## 2024-02-25 PROCEDURE — 2500000005 HC RX 250 GENERAL PHARMACY W/O HCPCS: Performed by: INTERNAL MEDICINE

## 2024-02-25 PROCEDURE — 2500000001 HC RX 250 WO HCPCS SELF ADMINISTERED DRUGS (ALT 637 FOR MEDICARE OP)

## 2024-02-25 PROCEDURE — 82947 ASSAY GLUCOSE BLOOD QUANT: CPT

## 2024-02-25 PROCEDURE — 83735 ASSAY OF MAGNESIUM: CPT | Performed by: INTERNAL MEDICINE

## 2024-02-25 PROCEDURE — 85025 COMPLETE CBC W/AUTO DIFF WBC: CPT | Performed by: INTERNAL MEDICINE

## 2024-02-25 RX ADMIN — PANTOPRAZOLE SODIUM 40 MG: 40 TABLET, DELAYED RELEASE ORAL at 08:24

## 2024-02-25 RX ADMIN — ATORVASTATIN CALCIUM 40 MG: 40 TABLET, FILM COATED ORAL at 08:24

## 2024-02-25 RX ADMIN — POLYETHYLENE GLYCOL 3350 17 G: 17 POWDER, FOR SOLUTION ORAL at 08:24

## 2024-02-25 RX ADMIN — VENLAFAXINE HYDROCHLORIDE 75 MG: 75 CAPSULE, EXTENDED RELEASE ORAL at 08:24

## 2024-02-25 RX ADMIN — CLOPIDOGREL 75 MG: 75 TABLET ORAL at 08:23

## 2024-02-25 RX ADMIN — IPRATROPIUM BROMIDE AND ALBUTEROL SULFATE 3 ML: 2.5; .5 SOLUTION RESPIRATORY (INHALATION) at 19:23

## 2024-02-25 RX ADMIN — INSULIN LISPRO 6 UNITS: 100 INJECTION, SOLUTION INTRAVENOUS; SUBCUTANEOUS at 18:33

## 2024-02-25 RX ADMIN — METOPROLOL SUCCINATE 50 MG: 50 TABLET, EXTENDED RELEASE ORAL at 20:30

## 2024-02-25 RX ADMIN — Medication 5 MG: at 20:30

## 2024-02-25 RX ADMIN — INSULIN LISPRO 6 UNITS: 100 INJECTION, SOLUTION INTRAVENOUS; SUBCUTANEOUS at 12:46

## 2024-02-25 RX ADMIN — LEVOTHYROXINE SODIUM 200 MCG: 100 TABLET ORAL at 05:18

## 2024-02-25 RX ADMIN — IPRATROPIUM BROMIDE AND ALBUTEROL SULFATE 3 ML: 2.5; .5 SOLUTION RESPIRATORY (INHALATION) at 08:02

## 2024-02-25 RX ADMIN — FERROUS GLUCONATE 324 MG: 324 TABLET ORAL at 08:24

## 2024-02-25 RX ADMIN — AMLODIPINE BESYLATE 5 MG: 5 TABLET ORAL at 08:24

## 2024-02-25 RX ADMIN — ASPIRIN 81 MG: 81 TABLET, COATED ORAL at 08:24

## 2024-02-25 RX ADMIN — PREDNISONE 40 MG: 20 TABLET ORAL at 08:24

## 2024-02-25 RX ADMIN — IPRATROPIUM BROMIDE AND ALBUTEROL SULFATE 3 ML: 2.5; .5 SOLUTION RESPIRATORY (INHALATION) at 14:48

## 2024-02-25 RX ADMIN — PANTOPRAZOLE SODIUM 40 MG: 40 TABLET, DELAYED RELEASE ORAL at 20:29

## 2024-02-25 RX ADMIN — Medication 4 L/MIN: at 08:02

## 2024-02-25 RX ADMIN — AMIODARONE HYDROCHLORIDE 200 MG: 200 TABLET ORAL at 08:23

## 2024-02-25 RX ADMIN — METOPROLOL SUCCINATE 50 MG: 50 TABLET, EXTENDED RELEASE ORAL at 08:24

## 2024-02-25 ASSESSMENT — COGNITIVE AND FUNCTIONAL STATUS - GENERAL
EATING MEALS: A LOT
DRESSING REGULAR UPPER BODY CLOTHING: A LOT
MOVING FROM LYING ON BACK TO SITTING ON SIDE OF FLAT BED WITH BEDRAILS: A LOT
MOBILITY SCORE: 12
PERSONAL GROOMING: A LOT
DRESSING REGULAR LOWER BODY CLOTHING: A LOT
WALKING IN HOSPITAL ROOM: A LOT
DRESSING REGULAR LOWER BODY CLOTHING: A LOT
DAILY ACTIVITIY SCORE: 12
HELP NEEDED FOR BATHING: A LOT
CLIMB 3 TO 5 STEPS WITH RAILING: A LOT
DRESSING REGULAR UPPER BODY CLOTHING: A LOT
TURNING FROM BACK TO SIDE WHILE IN FLAT BAD: A LOT
PERSONAL GROOMING: A LOT
MOVING TO AND FROM BED TO CHAIR: A LOT
MOVING FROM LYING ON BACK TO SITTING ON SIDE OF FLAT BED WITH BEDRAILS: A LOT
EATING MEALS: A LOT
CLIMB 3 TO 5 STEPS WITH RAILING: A LOT
MOVING TO AND FROM BED TO CHAIR: A LOT
MOBILITY SCORE: 12
STANDING UP FROM CHAIR USING ARMS: A LOT
STANDING UP FROM CHAIR USING ARMS: A LOT
TOILETING: A LOT
TOILETING: A LOT
TURNING FROM BACK TO SIDE WHILE IN FLAT BAD: A LOT
HELP NEEDED FOR BATHING: A LOT
DAILY ACTIVITIY SCORE: 12
WALKING IN HOSPITAL ROOM: A LOT

## 2024-02-25 ASSESSMENT — PAIN SCALES - GENERAL: PAINLEVEL_OUTOF10: 0 - NO PAIN

## 2024-02-25 ASSESSMENT — PAIN - FUNCTIONAL ASSESSMENT: PAIN_FUNCTIONAL_ASSESSMENT: 0-10

## 2024-02-25 NOTE — PROGRESS NOTES
Subjective Data:  No chest pain or dyspnea, tearful wants to be home    Overnight Events:    NoNE     Objective Data:  Last Recorded Vitals:  Vitals:    02/24/24 2024 02/25/24 0440 02/25/24 0802 02/25/24 1100   BP:  151/88  150/79   BP Location:  Left arm     Patient Position:  Lying     Pulse:  62  64   Resp:  20  13   Temp:  36.7 °C (98.1 °F)  36.8 °C (98.2 °F)   TempSrc:  Temporal     SpO2: 95% 97% 99% 99%   Weight:       Height:           Last Labs:  CBC - 2/25/2024:  6:10 AM  7.6 7.9 349    26.1      CMP - 2/25/2024:  6:10 AM  7.8 5.5 20 --- 0.3   5.5 2.9 13 71      PTT - 12/15/2023:  2:47 PM  1.0   10.9 30     TROPHS   Date/Time Value Ref Range Status   02/18/2024 05:54 PM 40 0 - 13 ng/L Final   02/18/2024 04:21 PM 43 0 - 13 ng/L Final   02/01/2024 05:15 AM 18 0 - 13 ng/L Final     BNP   Date/Time Value Ref Range Status   02/21/2024 06:14 AM 1,443 0 - 99 pg/mL Final   02/18/2024 05:36 PM 1,444 0 - 99 pg/mL Final     HGBA1C   Date/Time Value Ref Range Status   02/16/2024 05:00 AM 7.9 4.7 - 6.4 % Final     Comment:     Interpretation:     Standardized A1c  Good control or normal:  4-6% ( mg/dL avg)  Moderate control:        6.1-8.0% (120-180 mg/dL avg)  Poor control:            >8.0% (180 mg/dL avg)  With 4% as a baseline, each 1% increase = 30 mg/dL increase in average   glucose.  Taken from DCCT (Diabetes Control Complications Trial)   10/18/2023 01:15 PM 9.4 see below % Final   04/27/2023 06:24 AM 9.9 % Final     Comment:          Diagnosis of Diabetes-Adults   Non-Diabetic: < or = 5.6%   Increased risk for developing diabetes: 5.7-6.4%   Diagnostic of diabetes: > or = 6.5%  .       Monitoring of Diabetes                Age (y)     Therapeutic Goal (%)   Adults:          >18           <7.0   Pediatrics:    13-18           <7.5                   7-12           <8.0                   0- 6            7.5-8.5   American Diabetes Association. Diabetes Care 33(S1), Jan 2010.     02/23/2023 11:13 AM 9.1 4.0 -  6.0 % Final     Comment:     Hemoglobin A1C levels are related to mean blood glucose during the   preceding 2-3 months. The relationship table below may be used as a   general guide. Each 1% increase in HGB A1C is a reflection of an   increase in mean glucose of approximately 30 mg/dl.   Reference: Diabetes Care, volume 29, supplement 1 Jan. 2006                        HGB A1C ................. Approx. Mean Glucose   _______________________________________________   6%   ...............................  120 mg/dl   7%   ...............................  150 mg/dl   8%   ...............................  180 mg/dl   9%   ...............................  210 mg/dl   10%  ...............................  240 mg/dl  Performed at [address] [address]       LDLCALC   Date/Time Value Ref Range Status   02/23/2023 11:13 AM 77 65 - 130 MG/DL Final   04/07/2021 06:26  65 - 130 MG/DL Final   07/15/2020 09:12 AM 78 65 - 130 MG/DL Final     VLDL   Date/Time Value Ref Range Status   04/27/2023 06:24 AM 20 0 - 40 mg/dL Final      Last I/O:  I/O last 3 completed shifts:  In: 300 (3 mL/kg) [P.O.:300]  Out: 350 (3.6 mL/kg) [Urine:350 (0.1 mL/kg/hr)]  Weight: 98.5 kg     Past Cardiology Tests (Last 3 Years):  EKG:  ECG 12 Lead 02/18/2024      ECG 12 lead 02/01/2024    Echo:  Transthoracic Echo (TTE) Complete 02/05/2024    Ejection Fractions:  EF   Date/Time Value Ref Range Status   02/05/2024 01:52 PM 63 %      Cath:  Cardiac catheterization - non-coronary 02/07/2024    Stress Test:  No results found for this or any previous visit from the past 1095 days.    Cardiac Imaging:  No results found for this or any previous visit from the past 1095 days.      Inpatient Medications:  Scheduled medications   Medication Dose Route Frequency    amiodarone  200 mg oral Daily with breakfast    amLODIPine  5 mg oral Daily    aspirin  81 mg oral Daily    atorvastatin  40 mg oral Daily    clopidogrel  75 mg oral Daily     ferrous gluconate  324 mg oral Daily with breakfast    [Held by provider] furosemide  80 mg oral BID    [Held by provider] heparin (porcine)  5,000 Units subcutaneous q8h    [Held by provider] insulin glargine  14 Units subcutaneous Nightly    insulin lispro  0-15 Units subcutaneous TID with meals    ipratropium-albuteroL  3 mL nebulization TID    levothyroxine  200 mcg oral Daily before breakfast    lidocaine  5 mL infiltration Once    melatonin  5 mg oral Daily    metoprolol succinate XL  50 mg oral BID    pantoprazole  40 mg oral BID    polyethylene glycol  17 g oral Daily    potassium chloride CR  20 mEq oral Daily    potassium chloride CR  40 mEq oral Daily    predniSONE  40 mg oral Daily    venlafaxine XR  75 mg oral Daily     PRN medications   Medication    acetaminophen    Or    acetaminophen    Or    acetaminophen    acetaminophen    Or    acetaminophen    Or    acetaminophen    albuterol    dextrose 10 % in water (D10W)    dextrose    diphenhydrAMINE    glucagon    ipratropium-albuteroL    ondansetron    Or    ondansetron    oxygen    oxygen    vancomycin     Continuous Medications   Medication Dose Last Rate    [Held by provider] furosemide  10 mg/hr 10 mg/hr (02/22/24 1927)       Physical Exam:  Constitutional: Well developed, awake/alert/oriented x3, no distress, alert and cooperative  Eyes: PERRL, EOMI, clear sclera  ENMT: mucous membranes moist, no apparent injury, no lesions seen  Head/Neck: Neck supple, no apparent injury, thyroid without mass or tenderness, No JVD, trachea midline, no bruits  Respiratory/Thorax: Patent airways, CTAB, normal breath sounds with good chest expansion, thorax symmetric  Cardiovascular: Regular, rate and rhythm, no murmurs, 2+ equal pulses of the extremities, normal S 1and S 2  Gastrointestinal: Nondistended, soft, non-tender, no rebound tenderness or guarding, no masses palpable, no organomegaly, +BS, no bruits  Musculoskeletal: ROM intact, no joint swelling, normal  strength  Extremities: normal extremities, no cyanosis edema, contusions or wounds, no clubbing  Neurological: alert and oriented x3, intact senses, motor, response and reflexes, normal strength  Lymphatic: No significant lymphadenopathy  Psychological: Appropriate mood and behavior  Skin: Warm and dry, no lesions, no rashes      Assessment/Plan   Principal Problem:    Heart failure (CMS/HCC)     1. Acute on chronic hypoxic/hypercapnic respiratory failure 2/2 acute on chronic diastolic CHF, influenza, COPD   -Isolation  -Tamiflu  -BNP 1444  -CXR showed CHF, Grossly stable pleural and parenchymal opacities in the mid and lower right hemithorax. Mild new left pleural effusion  -VQ scan showed moderate right pleural effusion  -bronchodilators  -Steroids  -oxygen/bipap support  -Strict I & Os  -Daily weights  -2gm na diet  -Echo as above  -Unable to start SGLT2 inhibitors with current GFR  -s/p RHC during last admit showed MEAN PA 30 MILD PULMONARY HTN MEAN RA 9 MM HG MEAN PCWP 23 MM HG CARDIAC OUTPUT 8.03 CI 3.79 NO SHUNTS    --Still appears volume overloaded plan for dialysis      2. Elevated troponins-non MI elevation 2/2 influenza, respiratory distress  -Denies chest pain  -Does have shortness of breath-worsening for weeks  -Unable to have LHC due to creatinine  -Cont asa, plavix, statin, metoprolol  -Monitor on tele     3. Paroxysmal atrial fibrillation with RVR  -s/p Watchman in Aug 2023  -Currently SB/SR  -Cont amiodarone and metoprolol for rate control  -Monitor on tele     4. Hypertension  -stable  -2gm na diet  -cont meds  -monitor     5. Acute on chronic Anemia  -hgb down to 6.4, s/p PRBCs  -Hgb 7.9 today  -Monitor CBC  -Cont asa and plavix for now->hold if hgb conts to drop     6. ESRD  -s/p nephrectomy  -Renal US negative  -Anuric, proceed with dialysis      7. Diabetes  -ISS  -Accuchecks     8. Hypothyroidism  -Cont synthroid  Peripheral IV 02/20/24 22 G Right Hand (Active)   Site Assessment  Clean;Dry;Intact 02/25/24 0740   Dressing Status Clean;Dry 02/25/24 0740   Number of days: 5       Midline 02/21/24 Left Cephalic vein (Active)   Site Assessment Clean;Dry;Intact 02/25/24 0740   Dressing Status Clean;Dry 02/25/24 0740   Number of days: 4       Code Status:  DNR and No Intubation    I spent 15 minutes in the professional and overall care of this patient.        Nicholas Antonio MD

## 2024-02-25 NOTE — PROGRESS NOTES
Department of Medicine  Division of Pulmonary, Critical Care, and Sleep Medicine    Daphne Morris is a 68 y.o. female on day 6 of admission presenting with Heart failure (CMS/HCC).    Subjective   Stable, no events last night       Objective     Vital Signs      2/23/2024     8:20 PM 2/23/2024     8:26 PM 2/24/2024     1:20 AM 2/24/2024     4:33 AM 2/24/2024     9:05 AM 2/24/2024     3:23 PM 2/25/2024     4:40 AM   Vitals   Systolic 144 149 126 158 147 143 151   Diastolic 77 76 74 80 76 68 88   Heart Rate 59 60 61 67 61 59 62   Temp 36 °C (96.8 °F) 36.8 °C (98.2 °F)  37 °C (98.6 °F) 36.7 °C (98.1 °F)  36.7 °C (98.1 °F)   Resp 18 18 20 21 22 17 20        Physical exam  Constitutional: Normal appearance.  HEENT: Normocephalic and atraumatic.  Cardiovascular: Normal rate and regular rhythm.  Pulmonary: Diffuse expiratory wheeze, coarse breath sounds.  Musculoskeletal: No edema, no cyanosis.  Neurological: Awake, alert and oriented x3.  Psychiatric: Normal behavior, mood and affect.    Labs:  Lab Results   Component Value Date    WBC 7.6 02/25/2024    HGB 7.9 (L) 02/25/2024    HCT 26.1 (L) 02/25/2024    MCV 97 02/25/2024     02/25/2024      Lab Results   Component Value Date    GLUCOSE 196 (H) 02/25/2024    CALCIUM 7.8 (L) 02/25/2024     02/25/2024    K 5.2 02/25/2024    CO2 30 02/25/2024    CL 94 (L) 02/25/2024     (HH) 02/25/2024    CREATININE 4.08 (H) 02/25/2024      Lab Results   Component Value Date    ALT 13 02/19/2024    AST 20 02/19/2024    ALKPHOS 71 02/19/2024    BILITOT 0.3 02/19/2024        Oxygen Therapy  SpO2: 97 %  Medical Gas Therapy: Supplemental oxygen  O2 Delivery Method: High flow nasal cannula       Intake/Output last 3 Shifts:  I/O last 3 completed shifts:  In: 300 (3 mL/kg) [P.O.:300]  Out: 350 (3.6 mL/kg) [Urine:350 (0.1 mL/kg/hr)]  Weight: 98.5 kg       Medications   Scheduled medications  amiodarone, 200 mg, oral, Daily with breakfast  amLODIPine, 5 mg, oral,  Daily  aspirin, 81 mg, oral, Daily  atorvastatin, 40 mg, oral, Daily  clopidogrel, 75 mg, oral, Daily  ferrous gluconate, 324 mg, oral, Daily with breakfast  [Held by provider] furosemide, 80 mg, oral, BID  [Held by provider] heparin (porcine), 5,000 Units, subcutaneous, q8h  [Held by provider] insulin glargine, 14 Units, subcutaneous, Nightly  insulin lispro, 0-15 Units, subcutaneous, TID with meals  ipratropium-albuteroL, 3 mL, nebulization, TID  levothyroxine, 200 mcg, oral, Daily before breakfast  lidocaine, 5 mL, infiltration, Once  melatonin, 5 mg, oral, Daily  metoprolol succinate XL, 50 mg, oral, BID  pantoprazole, 40 mg, oral, BID  polyethylene glycol, 17 g, oral, Daily  potassium chloride CR, 20 mEq, oral, Daily  potassium chloride CR, 40 mEq, oral, Daily  predniSONE, 40 mg, oral, Daily  venlafaxine XR, 75 mg, oral, Daily      Continuous medications  [Held by provider] furosemide, 10 mg/hr, Last Rate: 10 mg/hr (02/22/24 1927)      PRN medications  PRN medications: acetaminophen **OR** acetaminophen **OR** acetaminophen, acetaminophen **OR** acetaminophen **OR** acetaminophen, albuterol, dextrose 10 % in water (D10W), dextrose, diphenhydrAMINE, glucagon, ipratropium-albuteroL, ondansetron **OR** ondansetron, oxygen, oxygen, vancomycin     Allergies  Adhesive tape-silicones, Amoxicillin, Bee venom protein (honey bee), Codeine, Doxycycline, Latex, Lisinopril, Prednisone, Citalopram, Oseltamivir, and Phenyleph-shark oil-glyc-pet    Chest Radiograph   No results found for this or any previous visit from the past 10 days.       XR chest 1 view 02/21/2024    Narrative  Interpreted By:  Mario Sampson,  STUDY:  XR CHEST 1 VIEW;  2/21/2024 11:37 am    INDICATION:  Signs/Symptoms:SOB.    COMPARISON:  02/18/2024 AP and 02/13/2024 AP and lateral chest x-rays and the  02/01/2024 unenhanced thoracic CT.    ACCESSION NUMBER(S):  Multilevel spinal degenerative changes are noted. [id number]    ORDERING  CLINICIAN:  JIM ADORNO    FINDINGS:  Portable AP upright chest x-ray 02/21/2024:        CARDIOMEDIASTINAL SILHOUETTE:  The heart seems enlarged, but its size is likely to be exaggerated by  the AP lordotic projection. Vascular calcification and a left atrial  appendage Watchman device are noted. Mild coronary artery  calcification demonstrated on CT can not be appreciated.    LUNGS:  A large right pleural effusion demonstrated on the above studies  markedly limits evaluation of the right lung. New left retrocardiac  opacity suggests lower lobe infiltrate or atelectasis as the left  lateral costophrenic angle is sharp. Pulmonary vascular cephalization  suggests congestive cardiac failure, fluid overload or uremia. Refer  to the 02/01/2024 CT report for further details.    ABDOMEN:  No remarkable upper abdominal findings.    BONES:  No acute osseous changes. Mild multilevel spinal degenerative changes  are noted.    Impression  1. Large right pleural effusion similar to recent prior studies.  2. Pulmonary venous hypertension.  3. Cardiomegaly.  4. Probable left lower lobe infiltrate or atelectasis.        MACRO:  None    Signed by: Mario Sampson 2/21/2024 11:57 AM  Dictation workstation:   [id number]      XR chest 1 view 02/18/2024    Narrative  Interpreted By:  Erlin Lui,  STUDY:  XR CHEST 1 VIEW;  2/18/2024 4:32 pm    INDICATION:  Signs/Symptoms:Dyspnea history of CHF.    COMPARISON:  CT scan from 02/01/2024.    ACCESSION NUMBER(S):  [id number]    ORDERING CLINICIAN:  RAINER BARR    TECHNIQUE:  Single AP portable view of the chest was obtained.    FINDINGS:  MEDIASTINUM/ LUNGS/ TAMMIE:  Cardiomegaly. Pulmonary vasculature and interstitium are prominent  and indistinct. Slight new pleural and parenchymal opacities at the  lateral left lung base. Persistent pleural and parenchymal opacities  in the mid and lower right hemithorax. These are grossly stable.  Stable calcification in the aorta.    No  pneumothorax.  No tracheal deviation.  No abnormal hilar fullness or gross mass on either side.    BONES:  No lytic or blastic destructive bone lesion.  There is  mild-to-moderate disc space narrowing and endplate osteophytosis  throughout the thoracic spine.    UPPER ABDOMEN:  Grossly intact.    Impression  Findings of ongoing CHF.    Grossly stable pleural and parenchymal opacities in the mid and lower  right hemithorax. Mild new left pleural effusion with left basilar  atelectasis or edema.    MACRO:  None    Signed by: Erlin Lui 2/18/2024 4:59 PM  Dictation workstation:   [id number]         Assessment and Plan / Recommendations   Assessment/Plan   68-year-old woman with history of COPD on 2 L at baseline admitted with acute on chronic respiratory failure     1.  Acute on chronic hypoxic and hypercapnic respiratory failure  2.  Acute on chronic diastolic heart failure  3.  Right pleural effusion post recent thoracentesis, transudative likely due to CHF  4.  COPD exacerbation  5.  Influenza B     Improving, on 2L nc today      -Taper prednisone over a week  -DuoNebs  -completed Tamiflu  -Diuresis per primary/nephrology, renal function worsening, planning to start HD on Monday  -encourage incentive spirometry, wean O2 as tolerated (at baseline 2 L now)    Will sign off, please contact us with any questions.        Delores Colby MD

## 2024-02-25 NOTE — CARE PLAN
The patient's goals for the shift include      The clinical goals for the shift include Pt will remain above 92% this shift    Over the shift, the patient did not make progress toward the following goals. Barriers to progression include the patient. Recommendations to address these barriers include education to patient about respiratory equipment .

## 2024-02-25 NOTE — PROGRESS NOTES
Daphne Morris is a 68 y.o. female on day 6 of admission presenting with Heart failure (CMS/HCC).      Subjective   Seen and examined, feeling better today breathing is improving, no new complaints.  No overnight events.  The patient and her family are agreeable with hemodialysis.  The plan discussed with the patient and RN.    Update on 02/24/24: The patient family met with hospice today, they did not make a decision on hospice. I discussed the current clinical status, prognosis, hospice versus continue active plan with aggressive measures including hemodialysis.  After the discussion the patient nephew and the patient herself decided to learn more about hemodialysis and likely pursuing hemodialysis and try it.  We contacted nephrology to discuss hemodialysis with the patient family.       Objective     Last Recorded Vitals  /87   Pulse 61   Temp 36 °C (96.8 °F)   Resp 18   Wt 98.5 kg (217 lb 2.5 oz)   SpO2 98%   Intake/Output last 3 Shifts:  No intake or output data in the 24 hours ending 02/25/24 1408      Admission Weight  Weight: 95.4 kg (210 lb 5.1 oz) (02/19/24 0438)    Daily Weight  02/22/24 : 98.5 kg (217 lb 2.5 oz)    Image Results  ECG 12 Lead  Junctional rhythm  Left axis deviation  Inferior infarct , age undetermined  Anterolateral infarct (cited on or before 01-FEB-2024)  Abnormal ECG  When compared with ECG of 01-FEB-2024 02:46,  Junctional rhythm has replaced Sinus rhythm  Questionable change in initial forces of Lateral leads  See ED provider note for full interpretation and clinical correlation  Confirmed by Rose Viera ([id number]) on 2/24/2024 5:48:25 PM      Physical Exam  Constitutional: Well developed, awake/alert/oriented x3, no distress, alert and cooperative  Eyes: PERRL, EOMI, clear sclera  ENMT: mucous membranes moist, no apparent injury, no lesions seen  Head/Neck: Neck supple, no apparent injury, thyroid without mass or tenderness, No JVD, trachea midline, no  bruits  Respiratory/Thorax: Patent airways, CTAB, normal breath sounds with good chest expansion, thorax symmetric  Cardiovascular: Regular, rate and rhythm, no murmurs, 2+ equal pulses of the extremities, normal S 1and S 2  Gastrointestinal: Nondistended, soft, non-tender, no rebound tenderness or guarding, no masses palpable, no organomegaly, +BS, no bruits  Musculoskeletal: ROM intact, no joint swelling, normal strength  Extremities: normal extremities, no cyanosis edema, contusions or wounds, no clubbing  Neurological: alert and oriented x3, intact senses, motor, response and reflexes, normal strength  Lymphatic: No significant lymphadenopathy  Psychological: Appropriate mood and behavior  Skin: Warm and dry, no lesions, no rashes  Relevant Results      Scheduled medications  amiodarone, 200 mg, oral, Daily with breakfast  amLODIPine, 5 mg, oral, Daily  aspirin, 81 mg, oral, Daily  atorvastatin, 40 mg, oral, Daily  clopidogrel, 75 mg, oral, Daily  ferrous gluconate, 324 mg, oral, Daily with breakfast  [Held by provider] furosemide, 80 mg, oral, BID  [Held by provider] heparin (porcine), 5,000 Units, subcutaneous, q8h  [Held by provider] insulin glargine, 14 Units, subcutaneous, Nightly  insulin lispro, 0-15 Units, subcutaneous, TID with meals  ipratropium-albuteroL, 3 mL, nebulization, TID  levothyroxine, 200 mcg, oral, Daily before breakfast  lidocaine, 5 mL, infiltration, Once  melatonin, 5 mg, oral, Daily  metoprolol succinate XL, 50 mg, oral, BID  pantoprazole, 40 mg, oral, BID  polyethylene glycol, 17 g, oral, Daily  potassium chloride CR, 20 mEq, oral, Daily  predniSONE, 40 mg, oral, Daily  venlafaxine XR, 75 mg, oral, Daily      Continuous medications  [Held by provider] furosemide, 10 mg/hr, Last Rate: 10 mg/hr (02/22/24 1927)      PRN medications  PRN medications: acetaminophen **OR** acetaminophen **OR** acetaminophen, acetaminophen **OR** acetaminophen **OR** acetaminophen, albuterol, dextrose 10 % in  water (D10W), dextrose, diphenhydrAMINE, glucagon, ipratropium-albuteroL, ondansetron **OR** ondansetron, oxygen, oxygen, vancomycin          Results for orders placed or performed during the hospital encounter of 02/19/24 (from the past 24 hour(s))   POCT GLUCOSE   Result Value Ref Range    POCT Glucose 212 (H) 74 - 99 mg/dL   POCT GLUCOSE   Result Value Ref Range    POCT Glucose 192 (H) 74 - 99 mg/dL   CBC and Auto Differential   Result Value Ref Range    WBC 7.6 4.4 - 11.3 x10*3/uL    nRBC 0.0 0.0 - 0.0 /100 WBCs    RBC 2.68 (L) 4.00 - 5.20 x10*6/uL    Hemoglobin 7.9 (L) 12.0 - 16.0 g/dL    Hematocrit 26.1 (L) 36.0 - 46.0 %    MCV 97 80 - 100 fL    MCH 29.5 26.0 - 34.0 pg    MCHC 30.3 (L) 32.0 - 36.0 g/dL    RDW 13.5 11.5 - 14.5 %    Platelets 349 150 - 450 x10*3/uL    Neutrophils % 82.8 40.0 - 80.0 %    Immature Granulocytes %, Automated 1.3 (H) 0.0 - 0.9 %    Lymphocytes % 8.9 13.0 - 44.0 %    Monocytes % 6.9 2.0 - 10.0 %    Eosinophils % 0.0 0.0 - 6.0 %    Basophils % 0.1 0.0 - 2.0 %    Neutrophils Absolute 6.32 1.20 - 7.70 x10*3/uL    Immature Granulocytes Absolute, Automated 0.10 0.00 - 0.70 x10*3/uL    Lymphocytes Absolute 0.68 (L) 1.20 - 4.80 x10*3/uL    Monocytes Absolute 0.53 0.10 - 1.00 x10*3/uL    Eosinophils Absolute 0.00 0.00 - 0.70 x10*3/uL    Basophils Absolute 0.01 0.00 - 0.10 x10*3/uL   Renal Function Panel   Result Value Ref Range    Glucose 196 (H) 74 - 99 mg/dL    Sodium 137 136 - 145 mmol/L    Potassium 5.2 3.5 - 5.3 mmol/L    Chloride 94 (L) 98 - 107 mmol/L    Bicarbonate 30 21 - 32 mmol/L    Anion Gap 18 10 - 20 mmol/L    Urea Nitrogen 102 (HH) 6 - 23 mg/dL    Creatinine 4.08 (H) 0.50 - 1.05 mg/dL    eGFR 11 (L) >60 mL/min/1.73m*2    Calcium 7.8 (L) 8.6 - 10.3 mg/dL    Phosphorus 5.5 (H) 2.5 - 4.9 mg/dL    Albumin 2.9 (L) 3.4 - 5.0 g/dL   Magnesium   Result Value Ref Range    Magnesium 2.13 1.60 - 2.40 mg/dL   Vancomycin   Result Value Ref Range    Vancomycin 17.4 5.0 - 20.0 ug/mL   POCT  GLUCOSE   Result Value Ref Range    POCT Glucose 182 (H) 74 - 99 mg/dL   POCT GLUCOSE   Result Value Ref Range    POCT Glucose 225 (H) 74 - 99 mg/dL      NM Lung perfusion with spect/ct    Result Date: 2/19/2024  Interpreted By:  Rolando Dillon and Osman Sena STUDY: NM LUNG PERFUSION WITH SPECT/CT;  2/19/2024 9:58 am   INDICATION: Signs/Symptoms: 68-year-old female presented with known congestive heart failure. R/o pulmoanry embolism.   COMPARISON: Chest x-ray from 02/18/2024   ACCESSION NUMBER(S): [id number]   ORDERING CLINICIAN: SARAH LEMOS   TECHNIQUE: DIVISION OF NUCLEAR MEDICINE PERFUSION LUNG SCANS   Multiple perfusion images of the lungs were acquired after the intravenous administration of 4.0 mCi of Tc-99m macroaggregated albumin (MAA). In addition,SPECT/CT of the chest was performed.   FINDINGS: Perfusion images of both lungs demonstrate mild heterogeneity throughout the lung fields bilaterally. There are no distinct segmental or subsegmental perfusion defects. Fissure sign seen at right lung compatible with right pleural effusion.       There are no segmental or subsegmental perfusion defects in both lungs indicating low probability for acute pulmonary embolism. Moderate-sized right pleural effusion.     The interpretation above is based on modified PISAPED criteria.   This study was analyzed and interpreted at [place], [place].   Signed by: Rolando Dillon 2/19/2024 10:38 AM Dictation workstation:   [id number]    ECG 12 Lead    Result Date: 2/19/2024  Junctional rhythm Left axis deviation Inferior infarct , age undetermined Anterolateral infarct (cited on or before 01-FEB-2024) Abnormal ECG When compared with ECG of 01-FEB-2024 02:46, Junctional rhythm has replaced Sinus rhythm Questionable change in initial forces of Lateral leads    Vascular US lower extremity venous duplex bilateral    Result Date: 2/18/2024  Interpreted By:  Washington Peraza, STUDY: VASC US LOWER EXTREMITY  VENOUS DUPLEX BILATERAL  2/18/2024 8:09 pm   INDICATION: 67 y/o   F with  Signs/Symptoms:d dimer > 13,000. LMP:  Unknown.   COMPARISON: None.   ACCESSION NUMBER(S): [id number]   ORDERING CLINICIAN: RAINER BARR   TECHNIQUE: Routine ultrasound of the  bilateral lower extremity was performed with duplex Doppler (color and spectral) evaluation.   Static images were obtained for remote interpretation.   FINDINGS: THIGH VEINS:  The common femoral, femoral, popliteal, proximal medial saphenous, and deep femoral veins are patent and free of thrombus. The veins are normally compressible.  They demonstrate normal phasic flow and augmentation response.   CALF VEINS: Significant soft tissue swelling somewhat limits assessment.       Negative study.  No deep venous thrombosis of the  bilateral lower extremity.  Soft tissue swelling limits assessment   MACRO: None   Signed by: Washington Peraza 2/18/2024 8:41 PM Dictation workstation:   [id number]    XR chest 1 view    Result Date: 2/18/2024  Interpreted By:  Erlin Lui, STUDY: XR CHEST 1 VIEW;  2/18/2024 4:32 pm   INDICATION: Signs/Symptoms:Dyspnea history of CHF.   COMPARISON: CT scan from 02/01/2024.   ACCESSION NUMBER(S): [id number]   ORDERING CLINICIAN: RAINER BARR   TECHNIQUE: Single AP portable view of the chest was obtained.   FINDINGS: MEDIASTINUM/ LUNGS/ TAMMIE: Cardiomegaly. Pulmonary vasculature and interstitium are prominent and indistinct. Slight new pleural and parenchymal opacities at the lateral left lung base. Persistent pleural and parenchymal opacities in the mid and lower right hemithorax. These are grossly stable. Stable calcification in the aorta.   No pneumothorax. No tracheal deviation. No abnormal hilar fullness or gross mass on either side.   BONES: No lytic or blastic destructive bone lesion.  There is mild-to-moderate disc space narrowing and endplate osteophytosis throughout the thoracic spine.   UPPER ABDOMEN: Grossly intact.        Findings of ongoing CHF.   Grossly stable pleural and parenchymal opacities in the mid and lower right hemithorax. Mild new left pleural effusion with left basilar atelectasis or edema.   MACRO: None   Signed by: Erlin Lui 2/18/2024 4:59 PM Dictation workstation:   [id number]    XR chest 2 views    Result Date: 2/14/2024  Interpreted By:  Amando Starks, STUDY: XR CHEST 2 VIEWS; 2/13/2024 8:25 am   INDICATION: Signs/Symptoms:pleural effusion   COMPARISON: Radiographs 02/09/2024   ACCESSION NUMBER(S): [id number]   ORDERING CLINICIAN: TONIA MONTANA   TECHNIQUE: Frontal and lateral radiographs of the chest were obtained.   FINDINGS: LINES AND DEVICES: None.   LUNGS: Stable moderate to large right pleural effusion with adjacent atelectasis. No new focal consolidation, left pleural effusion or pneumothorax.   CARDIOMEDIASTINAL SILHOUETTE: Partially obscured right heart border by adjacent effusion.   OTHER: No acute osseous abnormality.       Stable moderate to large right pleural effusion.   MACRO None   Signed by: Amando Starks 2/14/2024 9:50 AM Dictation workstation:   [id number]    XR chest 1 view    Result Date: 2/9/2024  Interpreted By:  Sukumar Daugherty, STUDY: XR CHEST 1 VIEW;  2/9/2024 1:13 pm   INDICATION: Signs/Symptoms:fever.   COMPARISON: 02/02/2024   ACCESSION NUMBER(S): [id number]   ORDERING CLINICIAN: TONIA MONTANA   FINDINGS: Significantly increased large right pleural effusion with overlying atelectasis. Left lung grossly clear. Cardiomediastinal silhouette unchanged. Aortic atherosclerosis. Pulmonary vascular congestion.       Large right pleural effusion, significantly increased compared to 1 week ago.     Signed by: Sukumar Daugherty 2/9/2024 3:56 PM Dictation workstation:   [id number]    Cardiac catheterization - non-coronary    Result Date: 2/7/2024   [facility], Cath Lab, [address] [place] [address] Cardiovascular Catheterization Report Patient Name:      MOE OTERO  ZULMA      Performing Physician:  [id number] Nicholas Antonio MD Study Date:        2/7/2024             Verifying Physician:   [id number] Nicholas Antonio MD MRN/PID:           [id number]             Cardiologist/Co-scrub: Accession#:        [id number]         Ordering Physician:    [id number] DOMONIQUE PURCELL Date of Birth/Age: 1955 / 68 years Fellow: Gender:            F                    Fellow: Encounter#:        [id number]  Study: Right Heart Cath  Indications: MOE MAYA is a 69 year old female who presents with dyslipidemia, hypertension, renal failure, atrial fibrillation, diabetes, peripheral artery disease, chronic pulmonary disease, obesity and Tobacco Use - Current. Heart failure. Pulmonary hypertension and heart failure.  Procedure Description: After infiltration of local anesthetic, the right internal jugular vein was identified with two-dimensional ultrasound. Under direct ultrasound visualization, the right internal jugular vein was cannulated with a micropuncture technique. A 7 Sami sheath was placed in the vein. A balloon tipped catheter was advanced through the right heart to record pressures. Cardiac output was calculated via the Steph method.  Right Heart Catheterization: A balloon tipped catheter was advanced through the right heart to record pressures. Cardiac output was calculated via the Steph method. Elevated left sided filling pressures with normal cardiac output. Cardiac output is normal. Preserved cardiac output at rest. No evidence of shunt. Elevated pulmonary vascular resistance. Elevated systemic vascular resistance. Pulmonary venous hypertension and pulmonary arterial hypertension.  Hemo Personnel: +-------------------------+---------+ Name                     Duty       +-------------------------+---------+ Marisela Nicholas CRUZRONA MD 1 +-------------------------+---------+  +----------+ Contrast:  +----------+ Omnipaque: +----------+  Hemodynamic Pressures:  +----+---------+----------+------------+-------------+---+-----+-------+-------+ SiteDate TimePhase Name  Systolic    Diastolic  ED Mean A-Wave V-Wave                             mmHg        mmHg     mmHmmHg  mmHg   mmHg                                                    g                     +----+---------+----------+------------+-------------+---+-----+-------+-------+   RA 2/7/2024  AIR REST                                9     13     11       2:16:48                                                                     PM                                                          +----+---------+----------+------------+-------------+---+-----+-------+-------+   RV 2/7/2024  AIR REST          56            3 14                          2:17:18                                                                     PM                                                          +----+---------+----------+------------+-------------+---+-----+-------+-------+   PA 2/7/2024  AIR REST          55           15      30                     2:21:53                                                                     PM                                                          +----+---------+----------+------------+-------------+---+-----+-------+-------+   PW 2/7/2024  AIR REST                               23     20     35       2:23:01                                                                     PM                                                          +----+---------+----------+------------+-------------+---+-----+-------+-------+   PW 2/7/2024  AIR REST                                23     20     38       2:23:28                                                                     PM                                                          +----+---------+----------+------------+-------------+---+-----+-------+-------+   PA 2/7/2024  AIR REST          57           15      33                     2:24:12                                                                     PM                                                          +----+---------+----------+------------+-------------+---+-----+-------+-------+   AO 2/7/2024  AIR REST         176           62      -2                     2:28:01                                                                     PM                                                          +----+---------+----------+------------+-------------+---+-----+-------+-------+  Oxygen Saturation %: +-----------+----------+------------+ Sample SiteO2 Sat (%)HB (g/100ml) +-----------+----------+------------+          FA        94         8.6 +-----------+----------+------------+          RA        64         8.6 +-----------+----------+------------+          FA        94         8.6 +-----------+----------+------------+          PA        60         8.6 +-----------+----------+------------+  Cardiac Outputs: +---------------+------------------+-------+ ISABELLE CO (l/min)ISABELLE CI (l/min/m2)ISABELLE SV +---------------+------------------+-------+             8.0               3.8  138.5 +---------------+------------------+-------+  Vascular Resistance Calculated Values (Wood Units): +-----+---+----+---+----+ PhasePVRPVRITPRTPRI +-----+---+----+---+----+ 0    1.02.1 4.29.0  +-----+---+----+---+----+  Complications: No in-lab complications observed.  Cardiac Cath Post Procedure Notes: Post Procedure           Moderately elevated PCWP, normal cardiac  output, no Diagnosis:               shunts. Blood Loss:              Estimated blood loss during the procedure was 0 mls. Specimens Removed:       Number of specimen(s) removed: none.  Recommendations: Maximize medical therapy. Agressive risk factor modification efforts. Optimize volume status with diuretics. Renal replacement therapy. Patient counseled and advised to discontinue smoking. ____________________________________________________________________________________ CONCLUSIONS:  1. RHC shows moderate pulmonary HTN, mPAP 30-33 mm Hg, mRA 9 mm Hg, normal cardiac output and no shunts.  2. Moderately elevated PCWP. ICD 10 Codes: Acute diastolic (congestive) heart failure (CHF)-I50.31  CPT Codes: Right Heart Cath O2/Cardiac output without biopsy (RHC)-22738; Moderate Sedation Services 1st additional 15 minutes patient >5 years-10077; [facility]-46815  [id number] Nicholas Antonio MD Performing Physician Electronically signed by [id number] Nicholas Antonio MD on 2/7/2024 at 5:02:34 PM  ** Final **     Transthoracic Echo (TTE) Complete    Result Date: 2/5/2024   [facility], [address] [place] [address]               Tel [phone] and Fax [phone] TRANSTHORACIC ECHOCARDIOGRAM REPORT  Patient Name:      MOE SHANNA Lizarraga Physician:    [id number] Nicholas Antonio MD Study Date:        2/5/2024             Ordering Provider:    [id number] DOMONIQUE PURCELL MRN/PID:           [id number]             Fellow: Accession#:        [id number]         Nurse: Date of Birth/Age: 1955 / 68 years Sonographer:          Nina Angel RDCS Gender:            F                    Additional Staff: Height:            167.64 cm            Admit Date:           2/2/2024 Weight:            102.51 kg             Admission Status:     Inpatient -                                                               Routine BSA:               2.11 m2              Encounter#:           [id number]                                         Department Location:  [facility]                                                               Invasive Blood Pressure: 147 /65 mmHg Study Type:    TRANSTHORACIC ECHO (TTE) COMPLETE Diagnosis/ICD: Acute combined systolic (congestive) and diastolic (congestive)                heart failure (CHF)-I50.41 Indication:    Congestive Heart Failure CPT Code:      Echo Complete w Full Doppler-51533 Patient History: Diabetes:         Yes Pertinent         A-Fib, HTN, Dyspnea and LE Edema. Watchman device, elevated History:          BNP,. Study Detail: The following Echo studies were performed: 2D, M-Mode, Doppler and               color flow.  PHYSICIAN INTERPRETATION: Left Ventricle: The left ventricular systolic function is normal, with an estimated ejection fraction of 60-65%. There are no regional wall motion abnormalities. The left ventricular cavity size is normal. Spectral Doppler shows an impaired relaxation pattern of left ventricular diastolic filling. Left Atrium: The left atrium is moderately dilated. Right Ventricle: The right ventricle is mildly enlarged. There is mildly reduced right ventricular systolic function. Right Atrium: The right atrium is moderately dilated. Aortic Valve: The aortic valve is trileaflet. There is mild aortic valve cusp calcification. There is trivial aortic valve regurgitation. The peak instantaneous gradient of the aortic valve is 14.1 mmHg. The mean gradient of the aortic valve is 9.1 mmHg. Mitral Valve: The mitral valve is normal in structure. There is mild mitral annular calcification. There is mild to moderate mitral valve regurgitation. Tricuspid Valve: The tricuspid valve is structurally normal. There is moderate tricuspid regurgitation. Pulmonic Valve: The  pulmonic valve is structurally normal. There is mild pulmonic valve regurgitation. Pericardium: There is a small to moderate pericardial effusion posterior to the left ventricle. There is no evidence of cardiac tamponade. Aorta: The aortic root is normal. Pulmonary Artery: The tricuspid regurgitant velocity is 3.04 m/s, and with an estimated right atrial pressure of 3 mmHg, the estimated pulmonary artery pressure is mildly elevated with the RVSP at 39.9 mmHg. Pulmonary Veins: The pulmonary veins appear normal and return normally to the left atrium. Systemic Veins: The inferior vena cava appears to be of normal size. There is IVC inspiratory collapse greater than 50%.  CONCLUSIONS:  1. Left ventricular systolic function is normal with a 60-65% estimated ejection fraction.  2. Spectral Doppler shows an impaired relaxation pattern of left ventricular diastolic filling.  3. There is mildly reduced right ventricular systolic function.  4. The left atrium is moderately dilated.  5. The right atrium is moderately dilated.  6. There is a small to moderate pericardial effusion.  7. There is no evidence of cardiac tamponade.  8. Mild to moderate mitral valve regurgitation.  9. Moderate tricuspid regurgitation visualized. 10. Normal CVP. Estimated PASP around 45 mm Hg. QUANTITATIVE DATA SUMMARY: 2D MEASUREMENTS:                           Normal Ranges: IVSd:          1.52 cm    (0.6-1.1cm) LVPWd:         1.42 cm    (0.6-1.1cm) LVIDd:         4.35 cm    (3.9-5.9cm) LVIDs:         2.94 cm LV Mass Index: 120.7 g/m2 LV % FS        32.3 % LA VOLUME:                              Normal Ranges: LA Vol A4C:       79.5 ml    (22+/-6mL/m2) LA Vol A2C:       78.2 ml LA Vol BP:        79.7 ml LA Vol Index A4C: 37.7ml/m2 LA Vol Index A2C: 37.1 ml/m2 LA Vol Index BP:  37.9 ml/m2 LA Volume Index:  37.8 ml/m2 LA Vol A4C:       76.0 ml LA Vol A2C:       73.9 ml RA VOLUME BY A/L METHOD:                       Normal Ranges: RA Area A4C: 17.8 cm2  M-MODE MEASUREMENTS:                  Normal Ranges: Ao Root: 3.00 cm (2.0-3.7cm) LAs:     5.08 cm (2.7-4.0cm) LV SYSTOLIC FUNCTION BY 2D PLANIMETRY (MOD):                     Normal Ranges: EF-A4C View: 63.4 % (>=55%) EF-A2C View: 63.8 % EF-Biplane:  62.5 % LV DIASTOLIC FUNCTION:                             Normal Ranges: MV Peak E:      1.33 m/s    (0.7-1.2 m/s) MV Peak A:      0.53 m/s    (0.42-0.7 m/s) E/A Ratio:      2.52        (1.0-2.2) MV e'           0.06 m/s    (>8.0) MV lateral e'   0.06 m/s MV medial e'    0.06 m/s MV A Dur:       110.73 msec E/e' Ratio:     22.09       (<8.0) PulmV Sys Mike:  53.97 cm/s PulmV Dugan Mike: 77.63 cm/s PulmV S/D Mike:  0.70 MITRAL VALVE:                 Normal Ranges: MV DT: 245 msec (150-240msec) MITRAL INSUFFICIENCY:                         Normal Ranges: MR VTI:     171.38 cm MR Vmax:    489.73 cm/s MR Volume:  20.07 ml MR Flow Rt: 57.36 ml/s MR EROA:    0.12 cm2 AORTIC VALVE:                                    Normal Ranges: AoV Vmax:                1.88 m/s  (<=1.7m/s) AoV Peak P.1 mmHg (<20mmHg) AoV Mean P.1 mmHg  (1.7-11.5mmHg) LVOT Max Mike:            0.97 m/s  (<=1.1m/s) AoV VTI:                 46.22 cm  (18-25cm) LVOT VTI:                24.89 cm LVOT Diameter:           1.96 cm   (1.8-2.4cm) AoV Area, VTI:           1.62 cm2  (2.5-5.5cm2) AoV Area,Vmax:           1.56 cm2  (2.5-4.5cm2) AoV Dimensionless Index: 0.54  RIGHT VENTRICLE: RV Basal 3.80 cm RV Mid   2.80 cm RV Major 8.0 cm TAPSE:   18.0 mm RV s'    0.13 m/s TRICUSPID VALVE/RVSP:                             Normal Ranges: Peak TR Velocity: 3.04 m/s RV Syst Pressure: 39.9 mmHg (< 30mmHg) PULMONIC VALVE:                      Normal Ranges: PV Max Mike: 1.0 m/s  (0.6-0.9m/s) PV Max PG:  3.9 mmHg Pulmonary Veins: PulmV Dugan Mike: 77.63 cm/s PulmV S/D Mike:  0.70 PulmV Sys Mike:  53.97 cm/s  [id number] Nicholas Antonio MD Electronically signed on 2024 at 5:06:05 PM  ** Final **      XR knee left 4+ views    Result Date: 2/2/2024  Interpreted By:  Adama Jonas, STUDY: Left knee dated  2/2/2024.   INDICATION: Signs/Symptoms:left kne pain   COMPARISON: Radiographs dated 10/23/2018.   ACCESSION NUMBER(S): [id number]   ORDERING CLINICIAN: JOSUÉ REICH   TECHNIQUE: Four views of the left knee.   FINDINGS: No fracture or dislocation is evident.  There is a small knee joint effusion. There is moderate tricompartmental degenerative change of the knee. Reticular increased density is seen in the subcutaneous tissues.       1. Small joint effusion and degenerative change of the knee without osseous injury evident. Knowledge of any trauma may be helpful. If there is persistent concern for osseous injury, cross-sectional imaging can be considered. 2. Reticular increased density in the subcutaneous tissues which may represent lymphedema and/or cellulitis.   MACRO: None   Signed by: Adama Jonas 2/2/2024 4:24 PM Dictation workstation:   [id number]    XR chest 1 view    Result Date: 2/2/2024  Interpreted By:  Rayna Carson, STUDY: XR CHEST 1 VIEW;  2/2/2024 2:29 pm   INDICATION: Signs/Symptoms:S/p thoracentesis.   COMPARISON: 02/01/2024   ACCESSION NUMBER(S): [id number]   ORDERING CLINICIAN: AIDAN HERNANDEZ   TECHNIQUE: Portable AP upright   FINDINGS: The cardiac silhouette is enlarged but unchanged. Aorta is atherosclerotic. There is marked improvement in the right pleural effusion since the prior study as a result of interval thoracentesis. No pneumothorax is present. There is minimal fluid residual blunting the costophrenic angle. There is minimal atelectasis at the right lung base. Pulmonary vasculature appears congested. No interval change is noted in the osseous structures.       Marked interval improvement in the right pleural effusion with interval thoracentesis. No pneumothorax.   Pulmonary vascular congestion   Minimal atelectasis right lung base   MACRO: None.   Signed by: Rayna  Pretorius 2/2/2024 3:23 PM Dictation workstation:   [id number]    US renal complete    Result Date: 2/2/2024  Interpreted By:  Amando Starks, STUDY: US RENAL COMPLETE; 2/2/2024 12:23 pm   INDICATION: Signs/Symptoms:ANDREA on CKD.   COMPARISON: Renal ultrasound 05/01/2023   ACCESSION NUMBER(S): [id number]   ORDERING CLINICIAN: JESSICA EPPS   TECHNIQUE: Sonography of the kidneys and urinary bladder was performed.   FINDINGS: Right Kidney: Right total nephrectomy.   Left Kidney: *Renal length: 10.6 cm *Parenchyma: Normal parenchymal echogenicity. Normal parenchymal thickness. *Collecting system: No hydronephrosis. *Calculus: No echogenic, shadowing calculus. *Lesion: None.   Bladder: Normal sonographic appearance.       No left hydronephrosis. Right total nephrectomy.   MACRO: None   Signed by: Amando Starks 2/2/2024 2:36 PM Dictation workstation:   [id number]    ECG 12 lead    Result Date: 2/1/2024  Sinus bradycardia Left axis deviation Low voltage QRS Possible Anterolateral infarct , age undetermined Abnormal ECG When compared with ECG of 19-JUL-2023 14:12, Previous ECG has undetermined rhythm, needs review Left bundle branch block is no longer Present Borderline criteria for Anterior infarct are now Present Borderline criteria for Anterolateral infarct are now Present See ED provider note for full interpretation and clinical correlation Confirmed by Wanda Juan ([id number]) on 2/1/2024 10:32:50 AM    CT chest wo IV contrast    Result Date: 2/1/2024  Interpreted By:  Beulah Chacon, STUDY: CT CHEST WO IV CONTRAST;  2/1/2024 6:40 am   INDICATION: Signs/Symptoms:sob   COMPARISON: Chest x-ray 02/01/2024. CT chest 05/04/2021   ACCESSION NUMBER(S): [id number]   ORDERING CLINICIAN: BHARGAV SWEET   TECHNIQUE: Axial CT images were obtained through the chest with no intravenous contrast administration.  Coronal and sagittal reformats were performed.   FINDINGS: There is motion artifact.   HEART AND VESSELS:  No thoracic aortic aneurysm. Atherosclerotic calcifications are noted at the thoracic aorta. The main pulmonary artery is dilated. The heart is enlarged.   There is a small pericardial effusion. Status post left atrial appendage occlusion device placement.   MEDIASTINUM AND TAMMIE, LOWER NECK AND AXILLA: There is a 1.1 cm peripherally calcified nodule at the left lobe of the thyroid gland.   No obvious pathologically enlarged lymph nodes on unenhanced CT.   LUNGS AND AIRWAYS: The trachea and central airways are patent.   The evaluation of the lungs is degraded by motion artifact. There is a large right pleural effusion with complete collapse of the right lower lobe. Atelectasis/consolidation at right upper lobe and right middle lobe. Tree-in-bud airspace opacities at the right upper lobe and left lower lobe. There is mild atelectasis at the left lower lobe. No left pleural effusion. No pneumothorax.   UPPER ABDOMEN: Motion artifact. No obvious acute findings.   CHEST WALL AND OSSEOUS STRUCTURES: There is soft tissue edema at the chest wall and visualized abdominal wall. The evaluation for acute fracture is degraded by motion artifact. Multilevel degenerative changes of the spine.       1. Study degraded by motion artifact. Large right pleural effusion with complete collapse of the right lower lobe. Atelectasis/consolidation at the right upper lobe and right middle lobe. Follow-up CT after treatment recommended to ensure complete resolution and exclude underlying mass. 2. Tree-in-bud airspace opacities at the right upper lobe and left lower lobe, may be seen with acute infection/inflammation of the smaller airways such as bronchiolitis or bronchopneumonia. 3. Mild atelectasis at the left lower lobe. 4. Cardiomegaly. Small pericardial effusion. Dilated main pulmonary artery, please correlate for pulmonary arterial hypertension. 5. 1.1 cm peripherally calcified nodule at the left lobe of the thyroid gland. This can be  further characterized with nonemergent thyroid ultrasound. 6. Soft tissue edema at the chest wall and visualized abdominal wall.         MACRO:   Critical Finding:  See findings. Notification was initiated on 2/1/2024 at 6:50 am by  Beulah Chacon.  (**-YCF-**) Instructions:   Signed by: Beulah Chacon 2/1/2024 6:56 AM Dictation workstation:   [id number]    XR chest 1 view    Result Date: 2/1/2024  Interpreted By:  Lesley Dooley, STUDY: XR CHEST 1 VIEW;  2/1/2024 3:03 am   INDICATION: Signs/Symptoms:sob.   COMPARISON: 07/19/2023   ACCESSION NUMBER(S): [id number]   ORDERING CLINICIAN: BHARGAV SWEET   FINDINGS:     CARDIOMEDIASTINAL SILHOUETTE: Stable cardiomegaly. Vague density overlying the upper cardiac silhouette may represent a left atrial appendage closure device.   LUNGS: Opacity involving the mid to lower right thorax, likely combination of large right pleural effusion and atelectasis. No pneumothorax.   ABDOMEN: No remarkable upper abdominal findings.   BONES: No acute osseous abnormality.       New large right pleural effusion and basilar atelectasis. Underlying pneumonia is not excluded in the appropriate clinical setting.   MACRO: None   Signed by: Lesley Dooley 2/1/2024 3:19 AM Dictation workstation:   [id number]      Assessment/Plan        Principal Problem:    Heart failure (CMS/HCC)      Daphne Morris is a 68 y.o. female presenting with past medical history of heart failure, atrial fibrillation, Hypertension, type 2 DM transfer from [facility] ER due to acute hypoxic respiratory failure secondary to pneumonia, COPD exacerbation and acute diastolic heart failure.     #Acute hypoxic respiratory failure secondary to acute on chronic diastolic heart failure and influenza B pneumonia  -Cardiology on board, unable to do left heart cath due to elevated creatinine, unable to start SGLT2 inhibitors as well  -Pulmonary on board plan to continue prednisone, breathing treatment, patient finished  Tamiflu    #Stage III/IV chronic kidney disease  Status post right nephrectomy  Nephrology on board, family are agreeable with hemodialysis, unable to get a dialysis catheter over the weekend, plan to consult IR tomorrow in the morning so the patient can start hemodialysis tomorrow Monday, February 26    #Anemia  -Status post 1 unit of blood   -FOBT positive  -A iron saturation of 11 iron level of 24 ferritin in 200  -On ferrous gluconate    #D-Dimer 13,011  Ultrasound ruled out DVT  VQ scan low probability of PE     #Elevated troponin  Suspect demand ischemia  EKG with no significant changes from prior EKG  Continue aspirin, Plavix, statin, metoprolol  Cardiology following    #Paroxysmal atrial fibrillation  Status post watchman in August 2023  Currently in sinus rhythm  Continue amiodarone and metoprolol     #Type 2 diabetes mellitus  Continue insulin sliding scale  Holding Lantus due to limited oral intake  Holding Januvia  Continue diabetic diet  Follow Accu-Cheks     #Hypertension  Metoprolol      #GERD  -PPI     #yperlipidemia  Atorvastatin     #Depression  Venlafaxine     #Hypothyroidism  Continue Synthroid     #DVT prophylaxis  Heparin           #Disposition  -Patient met with hospice, the patient and her family are not ready to sign with hospice right now.  -CODE STATUS changed to DNR/DNI  -Nephrology discussed with the family hemodialysis and they were agreeable  -Plan to consult IR tomorrow to get dialysis catheter and to start hemodialysis tomorrow.          Myke Newsome MD

## 2024-02-25 NOTE — CARE PLAN
The patient's goals for the shift include      The clinical goals for the shift include pt will have pulse of 92% for the whole shift    Over the shift, the patient did not make progress toward the following goals. Barriers to progression include patient maintained O2 when oxygen is in nose. Recommendations to address these barriers include continue with plan of care.

## 2024-02-25 NOTE — PROGRESS NOTES
"Vancomycin Dosing by Pharmacy- FOLLOW UP    Daphne Morris is a 68 y.o. year old female who Pharmacy has been consulted for vancomycin dosing for cellulitis, skin and soft tissue. Based on the patient's indication and renal status this patient is being dosed based on a goal trough/random level of 10-15.     Renal function is currently declining    Current vancomycin dose:  Dose By Level    Most recent random level: 17.4 mcg/mL down from 18.2 yesterday 2/24    Visit Vitals  /88 (BP Location: Left arm, Patient Position: Lying)   Pulse 62   Temp 36.7 °C (98.1 °F) (Temporal)   Resp 20        Lab Results   Component Value Date    CREATININE 4.08 (H) 02/25/2024    CREATININE 4.09 (H) 02/24/2024    CREATININE 3.93 (H) 02/23/2024    CREATININE 3.75 (H) 02/22/2024        Patient weight is No results found for: \"PTWEIGHT\"    No results found for: \"CULTURE\"     I/O last 3 completed shifts:  In: 300 (3 mL/kg) [P.O.:300]  Out: 350 (3.6 mL/kg) [Urine:350 (0.1 mL/kg/hr)]  Weight: 98.5 kg   [unfilled]    Lab Results   Component Value Date    PATIENTTEMP  02/24/2024      Comment:      NOTE: Patient Results are Not Corrected for Temperature    PATIENTTEMP  02/22/2024      Comment:      NOTE: Patient Results are Not Corrected for Temperature    PATIENTTEMP  02/21/2024      Comment:      NOTE: Patient Results are Not Corrected for Temperature        Assessment/Plan    Random level 17.4 above recommended 10-15 level. Level decreased from 18.2 yesterday.  Last dose Vanco 500mg given on 2/22 - Continue to hold doses    This dosing regimen is predicted by [employer] to result in the following pharmacokinetic parameters:      The next level will be obtained on 2/27 at 0500. May be obtained sooner if clinically indicated.   Will continue to monitor renal function daily while on vancomycin and order serum creatinine at least every 48 hours if not already ordered.  Follow for continued vancomycin needs, clinical response, and " signs/symptoms of toxicity.       Karley Elaine, PharmD

## 2024-02-26 LAB
APTT PPP: 24 SECONDS (ref 27–38)
GLUCOSE BLD MANUAL STRIP-MCNC: 168 MG/DL (ref 74–99)
GLUCOSE BLD MANUAL STRIP-MCNC: 179 MG/DL (ref 74–99)
GLUCOSE BLD MANUAL STRIP-MCNC: 202 MG/DL (ref 74–99)
GLUCOSE BLD MANUAL STRIP-MCNC: 227 MG/DL (ref 74–99)
HOLD SPECIMEN: NORMAL
INR PPP: 1 (ref 0.9–1.1)
PROTHROMBIN TIME: 11.4 SECONDS (ref 9.8–12.8)
VANCOMYCIN SERPL-MCNC: 15.5 UG/ML (ref 5–20)

## 2024-02-26 PROCEDURE — 9420000001 HC RT PATIENT EDUCATION 5 MIN

## 2024-02-26 PROCEDURE — 2500000002 HC RX 250 W HCPCS SELF ADMINISTERED DRUGS (ALT 637 FOR MEDICARE OP, ALT 636 FOR OP/ED)

## 2024-02-26 PROCEDURE — 2500000002 HC RX 250 W HCPCS SELF ADMINISTERED DRUGS (ALT 637 FOR MEDICARE OP, ALT 636 FOR OP/ED): Performed by: FAMILY MEDICINE

## 2024-02-26 PROCEDURE — 80202 ASSAY OF VANCOMYCIN: CPT

## 2024-02-26 PROCEDURE — 94668 MNPJ CHEST WALL SBSQ: CPT

## 2024-02-26 PROCEDURE — 2500000005 HC RX 250 GENERAL PHARMACY W/O HCPCS: Performed by: INTERNAL MEDICINE

## 2024-02-26 PROCEDURE — 99232 SBSQ HOSP IP/OBS MODERATE 35: CPT | Performed by: STUDENT IN AN ORGANIZED HEALTH CARE EDUCATION/TRAINING PROGRAM

## 2024-02-26 PROCEDURE — 85610 PROTHROMBIN TIME: CPT | Performed by: INTERNAL MEDICINE

## 2024-02-26 PROCEDURE — 94640 AIRWAY INHALATION TREATMENT: CPT

## 2024-02-26 PROCEDURE — 2500000004 HC RX 250 GENERAL PHARMACY W/ HCPCS (ALT 636 FOR OP/ED)

## 2024-02-26 PROCEDURE — 99233 SBSQ HOSP IP/OBS HIGH 50: CPT | Performed by: INTERNAL MEDICINE

## 2024-02-26 PROCEDURE — 2500000001 HC RX 250 WO HCPCS SELF ADMINISTERED DRUGS (ALT 637 FOR MEDICARE OP)

## 2024-02-26 PROCEDURE — 82947 ASSAY GLUCOSE BLOOD QUANT: CPT

## 2024-02-26 PROCEDURE — 1200000002 HC GENERAL ROOM WITH TELEMETRY DAILY

## 2024-02-26 RX ORDER — VANCOMYCIN HYDROCHLORIDE 500 MG/100ML
500 INJECTION, SOLUTION INTRAVENOUS ONCE
Status: DISCONTINUED | OUTPATIENT
Start: 2024-02-26 | End: 2024-02-26

## 2024-02-26 RX ADMIN — FERROUS GLUCONATE 324 MG: 324 TABLET ORAL at 09:28

## 2024-02-26 RX ADMIN — IPRATROPIUM BROMIDE AND ALBUTEROL SULFATE 3 ML: 2.5; .5 SOLUTION RESPIRATORY (INHALATION) at 19:22

## 2024-02-26 RX ADMIN — METOPROLOL SUCCINATE 50 MG: 50 TABLET, EXTENDED RELEASE ORAL at 21:49

## 2024-02-26 RX ADMIN — Medication 3 L/MIN: at 08:13

## 2024-02-26 RX ADMIN — ASPIRIN 81 MG: 81 TABLET, COATED ORAL at 09:27

## 2024-02-26 RX ADMIN — AMLODIPINE BESYLATE 5 MG: 5 TABLET ORAL at 09:27

## 2024-02-26 RX ADMIN — PANTOPRAZOLE SODIUM 40 MG: 40 TABLET, DELAYED RELEASE ORAL at 09:27

## 2024-02-26 RX ADMIN — PANTOPRAZOLE SODIUM 40 MG: 40 TABLET, DELAYED RELEASE ORAL at 21:49

## 2024-02-26 RX ADMIN — ATORVASTATIN CALCIUM 40 MG: 40 TABLET, FILM COATED ORAL at 09:27

## 2024-02-26 RX ADMIN — AMIODARONE HYDROCHLORIDE 200 MG: 200 TABLET ORAL at 09:27

## 2024-02-26 RX ADMIN — IPRATROPIUM BROMIDE AND ALBUTEROL SULFATE 3 ML: 2.5; .5 SOLUTION RESPIRATORY (INHALATION) at 13:31

## 2024-02-26 RX ADMIN — LEVOTHYROXINE SODIUM 200 MCG: 100 TABLET ORAL at 05:40

## 2024-02-26 RX ADMIN — PREDNISONE 40 MG: 20 TABLET ORAL at 09:26

## 2024-02-26 RX ADMIN — POLYETHYLENE GLYCOL 3350 17 G: 17 POWDER, FOR SOLUTION ORAL at 09:26

## 2024-02-26 RX ADMIN — VENLAFAXINE HYDROCHLORIDE 75 MG: 75 CAPSULE, EXTENDED RELEASE ORAL at 09:28

## 2024-02-26 RX ADMIN — Medication 5 MG: at 21:49

## 2024-02-26 RX ADMIN — CLOPIDOGREL 75 MG: 75 TABLET ORAL at 09:26

## 2024-02-26 RX ADMIN — INSULIN LISPRO 6 UNITS: 100 INJECTION, SOLUTION INTRAVENOUS; SUBCUTANEOUS at 18:01

## 2024-02-26 RX ADMIN — METOPROLOL SUCCINATE 50 MG: 50 TABLET, EXTENDED RELEASE ORAL at 09:26

## 2024-02-26 RX ADMIN — IPRATROPIUM BROMIDE AND ALBUTEROL SULFATE 3 ML: 2.5; .5 SOLUTION RESPIRATORY (INHALATION) at 08:13

## 2024-02-26 RX ADMIN — INSULIN LISPRO 3 UNITS: 100 INJECTION, SOLUTION INTRAVENOUS; SUBCUTANEOUS at 12:34

## 2024-02-26 ASSESSMENT — COGNITIVE AND FUNCTIONAL STATUS - GENERAL
MOBILITY SCORE: 12
TOILETING: A LOT
CLIMB 3 TO 5 STEPS WITH RAILING: A LOT
MOVING FROM LYING ON BACK TO SITTING ON SIDE OF FLAT BED WITH BEDRAILS: A LOT
DRESSING REGULAR LOWER BODY CLOTHING: A LOT
DAILY ACTIVITIY SCORE: 14
TOILETING: A LOT
EATING MEALS: A LITTLE
DRESSING REGULAR UPPER BODY CLOTHING: A LOT
PERSONAL GROOMING: A LITTLE
DAILY ACTIVITIY SCORE: 14
HELP NEEDED FOR BATHING: A LOT
WALKING IN HOSPITAL ROOM: A LOT
MOVING FROM LYING ON BACK TO SITTING ON SIDE OF FLAT BED WITH BEDRAILS: A LOT
CLIMB 3 TO 5 STEPS WITH RAILING: A LOT
EATING MEALS: A LITTLE
STANDING UP FROM CHAIR USING ARMS: A LOT
MOVING TO AND FROM BED TO CHAIR: A LOT
HELP NEEDED FOR BATHING: A LOT
DRESSING REGULAR LOWER BODY CLOTHING: A LOT
TURNING FROM BACK TO SIDE WHILE IN FLAT BAD: A LOT
DRESSING REGULAR UPPER BODY CLOTHING: A LOT
MOVING TO AND FROM BED TO CHAIR: A LOT
TURNING FROM BACK TO SIDE WHILE IN FLAT BAD: A LOT
STANDING UP FROM CHAIR USING ARMS: A LOT
PERSONAL GROOMING: A LITTLE
MOBILITY SCORE: 12
WALKING IN HOSPITAL ROOM: A LOT

## 2024-02-26 ASSESSMENT — PAIN SCALES - GENERAL
PAINLEVEL_OUTOF10: 0 - NO PAIN

## 2024-02-26 NOTE — PROGRESS NOTES
02/26/24 1437   Discharge Planning   Living Arrangements Other (Comment)   Support Systems Friends/neighbors   Type of Residence Private residence   Number of Stairs to Enter Residence 1   Number of Stairs Within Residence 4   Do you have animals or pets at home? No   Who is requesting discharge planning? Provider   Home or Post Acute Services In home services   Type of Home Care Services Home OT;Home PT;Home nursing visits   Patient expects to be discharged to: Today, pt states she wants to return home on discharge and go to dialysis at [facility].  Called to RACHELLE, Silver Lima and Doug Feliciano to verify plan, messages left at both numbers.  Referral sent to Galina Barkley through [facility].

## 2024-02-26 NOTE — PROGRESS NOTES
"Vancomycin Dosing by Pharmacy- FOLLOW UP    Daphne Morris is a 68 y.o. year old female who Pharmacy has been consulted for vancomycin dosing for cellulitis, skin and soft tissue. Based on the patient's indication and renal status this patient is being dosed based on a goal trough/random level of 10-15.     Renal function is currently declining.    Current vancomycin dose: Holding Vanco    Most recent random level: 15.5 mcg/mL    Visit Vitals  /80 (BP Location: Left arm, Patient Position: Lying)   Pulse 61   Temp 36.3 °C (97.3 °F) (Temporal)   Resp 16        Lab Results   Component Value Date    CREATININE 4.08 (H) 02/25/2024    CREATININE 4.09 (H) 02/24/2024    CREATININE 3.93 (H) 02/23/2024    CREATININE 3.75 (H) 02/22/2024        Patient weight is No results found for: \"PTWEIGHT\"    No results found for: \"CULTURE\"     I/O last 3 completed shifts:  In: - (0 mL/kg)   Out: 100 (1 mL/kg) [Urine:100 (0 mL/kg/hr)]  Weight: 98.5 kg   [unfilled]    Lab Results   Component Value Date    PATIENTTEMP  02/24/2024      Comment:      NOTE: Patient Results are Not Corrected for Temperature    PATIENTTEMP  02/22/2024      Comment:      NOTE: Patient Results are Not Corrected for Temperature    PATIENTTEMP  02/21/2024      Comment:      NOTE: Patient Results are Not Corrected for Temperature        Assessment/Plan  Continue to hold vanco as random level is 15.5.  Renal function is declined.  Level is just decreasing by 1 point every 24 hours.  Will continue to hold       The next level will be obtained on 2/28 at w/ am labs. May be obtained sooner if clinically indicated.   Will continue to monitor renal function daily while on vancomycin and order serum creatinine at least every 48 hours if not already ordered.  Follow for continued vancomycin needs, clinical response, and signs/symptoms of toxicity.       Silver Cruz, PharmD           "

## 2024-02-26 NOTE — NURSING NOTE
Clinical Nurse Navigator Documentation     Met with the patient to review HF educational material and various outpatient services [facility] offers.  Patient declined HF material, stating that she does not want education.       Comments: Patient declined consultation/education.

## 2024-02-26 NOTE — PROGRESS NOTES
"Daphne Morris is a 68 y.o. female on day 10 of admission presenting with CHF (congestive heart failure), NYHA class I, acute on chronic, combined (CMS/HCC).     Subjective   Patient was seen and examined today. Awake and alert. She has no new complaints today. Renal function (Scr) has plateaued at 4.08    Objective   /90   Pulse 62   Temp 36.8 °C (98.3 °F)   Resp 18   Ht 1.676 m (5' 6\")   Wt 98.5 kg (217 lb 2.5 oz)   SpO2 96%   BMI 35.05 kg/m²   Wt Readings from Last 3 Encounters:   02/22/24 98.5 kg (217 lb 2.5 oz)   02/18/24 102 kg (224 lb 1.6 oz)   02/15/24 95.8 kg (211 lb 3.2 oz)       Physical Exam      Intake/Output Summary (Last 24 hours) at 2/26/2024 1117  Last data filed at 2/26/2024 0500  Gross per 24 hour   Intake --   Output 100 ml   Net -100 ml       General appearance: no distress awake and alert on room air, euvolemic on exam  Eyes: non-icteric  HEENT: atrumatic head, PEERLA, moist mucosa  Skin: no apparent rash, Heart: NSR, S1, S2 normal, no murmur or gallop  Lungs: Symmetrical expansion, decreased air entry bilaterally with basal crackles   Abdomen: soft, nt/nd, obese  Extremities: bilateral upper extremity edema with bilateral lower extremity swelling. Extremity tender to touch bu no inflammation   Neuro: No FND,asterixis, no focal deficits noticed      Review of Systems     Constitutional: no fever, no chills, no recent weight gain and no recent weight loss.   Eyes: no blurred vision and no diplopia.   ENT: no hearing loss, no earache, no sore throat, no swollen glands in the neck and no nasal discharge.   Cardiovascular: no chest pain, no palpitations and no lower extremity edema.   Respiratory: no shortness of breath, no chronic cough and no shortness of breath during exertion.   Gastrointestinal: no abdominal pain, no constipation, no heartburn, no vomiting, no bloody stools and no change in bowel movements.   Genitourinary: no dysuria and no hematuria.   Musculoskeletal: no " "arthralgias and no myalgias.   Skin: no rashes and no skin lesions.   Neurological: no headaches and no dizziness.   Psychiatric: no confusion, no depression and no anxiety.   Endocrine: no heat intolerance, no cold intolerance, appetite not increased, no thyroid disorder, no increased urinary frequency and no dry skin.   Hematologic/Lymphatic: does not bleed easily and does not bruise easily.   All other systems have been reviewed and are negative for complaint.     Data Review  Results from last 7 days   Lab Units 02/25/24  0610 02/24/24  0507 02/23/24  0533   WBC AUTO x10*3/uL 7.6 6.2 6.0   HEMOGLOBIN g/dL 7.9* 7.9* 8.0*   HEMATOCRIT % 26.1* 25.5* 26.2*   PLATELETS AUTO x10*3/uL 349 325 314            No results found for: \"URICACID\"        Lab Results   Component Value Date    HGBA1C 7.9 (H) 02/16/2024           Results from last 7 days   Lab Units 02/25/24  0610 02/24/24  0507 02/23/24  0533 02/22/24  0454 02/21/24  0614 02/20/24  0604   SODIUM mmol/L 137 133* 133* 132* 134* 136   POTASSIUM mmol/L 5.2 4.9 4.9 5.1 5.0 4.8   CHLORIDE mmol/L 94* 94* 94* 94* 96* 97*   CO2 mmol/L 30 29 27 29 29 30   BUN mg/dL 102* 88* 82* 78* 69* 63*   GLUCOSE mg/dL 196* 251* 217* 267* 179* 63*   CALCIUM mg/dL 7.8* 7.6* 7.8* 7.5* 7.6* 7.5*   ANION GAP mmol/L 18 15 17 14 14 14   CREATININE mg/dL 4.08* 4.09* 3.93* 3.75* 3.51* 3.20*   EGFR mL/min/1.73m*2 11* 11* 12* 13* 14* 15*           Albumin/Creatine Ratio   Date Value Ref Range Status   02/01/2024   Final     Comment:     One or more analytes used in this calculation is outside of the analytical measurement range. Calculation cannot be performed.            RFP  Recent Labs     02/25/24  0610 02/24/24  0507 02/23/24  0533 02/22/24  0454 02/21/24  0614 02/20/24  0604 02/19/24  0646 02/15/24  0658 02/13/24  1808    133* 133* 132* 134* 136 139   < > 142   K 5.2 4.9 4.9 5.1 5.0 4.8 5.1   < > 3.3*   CL 94* 94* 94* 94* 96* 97* 100   < > 98   CO2 30 29 27 29 29 30 31   < > 32   BUN " 102* 88* 82* 78* 69* 63* 61*   < > 67*   CREATININE 4.08* 4.09* 3.93* 3.75* 3.51* 3.20* 3.02*   < > 2.79*   GLUCOSE 196* 251* 217* 267* 179* 63* 88   < > 211*   CALCIUM 7.8* 7.6* 7.8* 7.5* 7.6* 7.5* 7.9*   < > 7.6*   PHOS 5.5* 5.5* 5.5* 6.0* 5.8* 4.8  --   --  3.9   EGFR 11* 11* 12* 13* 14* 15* 16*   < > 18*   ANIONGAP 18 15 17 14 14 14 13   < > 15    < > = values in this interval not displayed.        Urineanalysis  Recent Labs     02/19/24  1543 02/09/24  1248 02/01/24  0515 07/19/23  1437 04/26/23  1740 05/03/21  2035 04/23/21  0310 04/06/21  1200 04/10/18  1415   COLORU Yellow Yellow Yellow STRAW YELLOW YELLOW YELLOW PALE YELLOW YELLOW   APPEARANCEU Hazy* Clear Clear CLEAR CLEAR CLEAR HAZY HAZY CLOUDY   SPECGRAVU 1.016 1.011 1.013 1.008 1.009 1.012 1.020 1.005 1.017   SYLVIE 5.0 5.0 5.0 6.0 6.0 6.5 6.0 6.5 5.5   PROTUR >=500 (3+)* >=500 (3+)* >=500 (3+)* >=500 (3+)* 100(2+)* 300* 600* 100* 30*   GLUCOSEU 50 (1+)* 150 (2+)* >=500 (3+)* 150 (2+)* 50(1+)* 100* 150* 50* 300*   BLOODU NEGATIVE MODERATE (2+)* NEGATIVE LARGE (3+)* SMALL(1+)* TRACE* TRACE* MODERATE* TRACE*   KETONESU 5 (TRACE)* NEGATIVE NEGATIVE NEGATIVE NEGATIVE NEGATIVE NEGATIVE NEGATIVE NEGATIVE   BILIRUBINU NEGATIVE NEGATIVE NEGATIVE NEGATIVE NEGATIVE NEGATIVE NEGATIVE NEGATIVE NEGATIVE   NITRITEU NEGATIVE NEGATIVE NEGATIVE NEGATIVE NEGATIVE NEGATIVE NEGATIVE POSITIVE* NEGATIVE   LEUKOCYTESU NEGATIVE TRACE* NEGATIVE NEGATIVE NEGATIVE NEGATIVE SMALL* LARGE* LARGE*       Urine Electrolytes  Recent Labs     02/19/24  1543 02/09/24  1248 02/01/24  0515 07/19/23  1437 04/30/23  1316 04/26/23  1740 05/03/21  2035 04/23/21  0310 04/06/21  1200 04/10/18  1415   SODIUMURR  --   --  40  --  27  --   --   --   --   --    NACREATUR  --   --  75  --  27  --   --   --   --   --    KUR  --   --  64  --   --   --   --   --   --   --    KCREATUR  --   --  119  --   --   --   --   --   --   --    CREATU  --   --  53.6  53.6  --  101.0  --   --   --   --   --    PROTUR  >=500 (3+)* >=500 (3+)* >=500 (3+)* >=500 (3+)*  --  100(2+)* 300* 600* 100* 30*   ALBUMINUR  --   --  >2,250.0  --   --   --   --   --   --   --         Urine Micro  Recent Labs     02/19/24  1543 02/09/24  1248 02/01/24  0515 07/19/23  1437 04/26/23  1740 05/03/21  2035 04/23/21  0310 04/06/21  1200 04/10/18  1415   WBCU 1-5 6-10* 1-5 5* 1 NONE SEEN 53* LOADED 1,077*   RBCU 1-2 11-20* 3-5 >182* 1 2-4 5* 16-25 5*   HYALCASTU  --  2+* OCCASIONAL*  --   --   --  70  --  3   SQUAMEPIU 1-9 (SPARSE) 1-9 (SPARSE)  --  1 1  --  MODERATE  --  FEW   BACTERIAU 1+* 1+* 1+* 1+*  --   --  POSITIVE PRESENT  Performed at [facility] [address] [facility] [address]   POSITIVE   MUCUSU  --  FEW FEW FEW  --   --   --   --   --         Iron  Recent Labs     02/20/24  0604 05/02/23  0607 04/28/23  0615 04/27/23  0624 02/23/23  1113   IRON 24*  --   --  26* 54   TIBC 214*  --   --  289 281   IRONSAT 11*  --   --  9* 19.2   FERRITIN 234* 71 80  --  42        alteplase, 1 mg, intra-catheter, Once  amiodarone, 200 mg, oral, Daily with breakfast  amLODIPine, 5 mg, oral, Daily  aspirin, 81 mg, oral, Daily  atorvastatin, 40 mg, oral, Daily  clopidogrel, 75 mg, oral, Daily  ferrous gluconate, 324 mg, oral, Daily with breakfast  [Held by provider] furosemide, 80 mg, oral, BID  [Held by provider] heparin (porcine), 5,000 Units, subcutaneous, q8h  [Held by provider] insulin glargine, 14 Units, subcutaneous, Nightly  insulin lispro, 0-15 Units, subcutaneous, TID with meals  ipratropium-albuteroL, 3 mL, nebulization, TID  levothyroxine, 200 mcg, oral, Daily before breakfast  lidocaine, 5 mL, infiltration, Once  melatonin, 5 mg, oral, Daily  metoprolol succinate XL, 50 mg, oral, BID  pantoprazole, 40 mg, oral, BID  polyethylene glycol, 17 g, oral, Daily  potassium chloride CR, 20 mEq, oral, Daily  predniSONE, 40 mg, oral, Daily  venlafaxine XR, 75 mg, oral, Daily      Assessment and Plan   Daphne SHANNA Morris  is a 68 y.o. female who has  past medical history of  heart failure, atrial fibrillation s/p Watchman, right nephrectomy, hypertension, type 2 DM admitted for acute hypoxic respiratory failure 2/2 to pneumonia and acute decompensated heart failure.        # ANDREA on CKD   -Likely  Cardiorenal syndrome 2/2 Acute decompensated CHF  -(CKD) 4/A3-baseline SCr 1.8-2.5 most likely atherosclerotic cardiovascular disease/diabetic nephropathy, prior nephrectomy.  She follows with Dr. Mitchell  - Peaked Scr during this admission 3-4.09, GFR 11   -Kidney ultrasound with no obstruction on the left side.  Right total nephrectomy noticed  -Lasix 80 mg IV twice daily and lasix gtt held      Plan  -Patient will need intermittent hemodialysis, on account of persistently worsening renal function, refractory to dialysis therapy.  -Long discussion with patient on need to dialyze, she was also informed that although dialysis may increase life expectancy, may not necessarily improve quality of life.  -Patient yet to consents to dialysis, however recommended we call her nephew Silver  -We will discuss with Silver  -Discussed with patient's primary nephrologist Dr. Harris, who is agreeable to plan for IHD.  And in the event status patient consents to dialysis, subsequent dialysis sessions would be at [facility]  -Obtain tunneled dialysis catheter (IR consult), in the event patient consents.

## 2024-02-26 NOTE — CARE PLAN
The patient's goals for the shift include      The clinical goals for the shift include patient will maintain pulse ox of 92% or greater    Over the shift, the patient did not make progress toward the following goals. Barriers to progression include patient tolerated current oxygen level well. Recommendations to address these barriers include continue with plan of care.

## 2024-02-26 NOTE — PROGRESS NOTES
02/26/24 1128   Discharge Planning   Living Arrangements Other (Comment)   Support Systems Friends/neighbors   Assistance Needed Patient arrived from [facility], patient was hospitalized here from 2/1-2/15, prior to that was home alone, A&0x3, ambulates with rollator baseline, doesn't drive, while home was on 3 L at HS only, has NPdoing home visits Poly Diaz   Type of Residence Skilled nursing facility   Do you have animals or pets at home? No   Who is requesting discharge planning? Provider   Home or Post Acute Services Post acute facilities (Rehab/SNF/etc)   Type of Post Acute Facility Services Skilled nursing   Patient expects to be discharged to: [facility] assisting with new need for Dialysis, katrina from [facility]   Does the patient need discharge transport arranged? Yes   RoundTrip coordination needed? Yes   Has discharge transport been arranged? No   Patient Choice   Patient / Family choosing to utilize agency / facility established prior to hospitalization Yes

## 2024-02-26 NOTE — PROGRESS NOTES
"Occupational Therapy                 Therapy Communication Note    Patient Name: Daphne Morris  MRN: [id number]  Today's Date: 2/26/2024     Discipline: Occupational Therapy    Missed Visit Reason: Patient refused (\"They just told me I have 3 months to live. I'm waiting for my daughter to call.\") Discussed pt report with nurse.    Missed Time: Attempt    "

## 2024-02-27 LAB
ALBUMIN SERPL BCP-MCNC: 2.6 G/DL (ref 3.4–5)
ANION GAP SERPL CALC-SCNC: 16 MMOL/L (ref 10–20)
BUN SERPL-MCNC: 106 MG/DL (ref 6–23)
CALCIUM SERPL-MCNC: 7.8 MG/DL (ref 8.6–10.3)
CHLORIDE SERPL-SCNC: 97 MMOL/L (ref 98–107)
CO2 SERPL-SCNC: 27 MMOL/L (ref 21–32)
CREAT SERPL-MCNC: 3.89 MG/DL (ref 0.5–1.05)
EGFRCR SERPLBLD CKD-EPI 2021: 12 ML/MIN/1.73M*2
ERYTHROCYTE [DISTWIDTH] IN BLOOD BY AUTOMATED COUNT: 13.7 % (ref 11.5–14.5)
GLUCOSE BLD MANUAL STRIP-MCNC: 203 MG/DL (ref 74–99)
GLUCOSE BLD MANUAL STRIP-MCNC: 210 MG/DL (ref 74–99)
GLUCOSE BLD MANUAL STRIP-MCNC: 223 MG/DL (ref 74–99)
GLUCOSE BLD MANUAL STRIP-MCNC: 247 MG/DL (ref 74–99)
GLUCOSE SERPL-MCNC: 266 MG/DL (ref 74–99)
HBV SURFACE AB SER-ACNC: 38.9 MIU/ML
HBV SURFACE AG SERPL QL IA: NONREACTIVE
HCT VFR BLD AUTO: 25 % (ref 36–46)
HGB BLD-MCNC: 7.6 G/DL (ref 12–16)
MAGNESIUM SERPL-MCNC: 1.98 MG/DL (ref 1.6–2.4)
MCH RBC QN AUTO: 29.8 PG (ref 26–34)
MCHC RBC AUTO-ENTMCNC: 30.4 G/DL (ref 32–36)
MCV RBC AUTO: 98 FL (ref 80–100)
NRBC BLD-RTO: 0 /100 WBCS (ref 0–0)
PHOSPHATE SERPL-MCNC: 6 MG/DL (ref 2.5–4.9)
PLATELET # BLD AUTO: 338 X10*3/UL (ref 150–450)
POTASSIUM SERPL-SCNC: 4.8 MMOL/L (ref 3.5–5.3)
RBC # BLD AUTO: 2.55 X10*6/UL (ref 4–5.2)
SODIUM SERPL-SCNC: 135 MMOL/L (ref 136–145)
WBC # BLD AUTO: 11.4 X10*3/UL (ref 4.4–11.3)

## 2024-02-27 PROCEDURE — 99233 SBSQ HOSP IP/OBS HIGH 50: CPT | Performed by: INTERNAL MEDICINE

## 2024-02-27 PROCEDURE — 94660 CPAP INITIATION&MGMT: CPT

## 2024-02-27 PROCEDURE — 80069 RENAL FUNCTION PANEL: CPT | Performed by: STUDENT IN AN ORGANIZED HEALTH CARE EDUCATION/TRAINING PROGRAM

## 2024-02-27 PROCEDURE — 97110 THERAPEUTIC EXERCISES: CPT | Mod: GP

## 2024-02-27 PROCEDURE — 94640 AIRWAY INHALATION TREATMENT: CPT

## 2024-02-27 PROCEDURE — 80074 ACUTE HEPATITIS PANEL: CPT | Mod: GEALAB | Performed by: INTERNAL MEDICINE

## 2024-02-27 PROCEDURE — 2500000004 HC RX 250 GENERAL PHARMACY W/ HCPCS (ALT 636 FOR OP/ED)

## 2024-02-27 PROCEDURE — 1200000002 HC GENERAL ROOM WITH TELEMETRY DAILY

## 2024-02-27 PROCEDURE — 83735 ASSAY OF MAGNESIUM: CPT | Performed by: STUDENT IN AN ORGANIZED HEALTH CARE EDUCATION/TRAINING PROGRAM

## 2024-02-27 PROCEDURE — 94668 MNPJ CHEST WALL SBSQ: CPT

## 2024-02-27 PROCEDURE — 2500000002 HC RX 250 W HCPCS SELF ADMINISTERED DRUGS (ALT 637 FOR MEDICARE OP, ALT 636 FOR OP/ED)

## 2024-02-27 PROCEDURE — 85027 COMPLETE CBC AUTOMATED: CPT | Performed by: STUDENT IN AN ORGANIZED HEALTH CARE EDUCATION/TRAINING PROGRAM

## 2024-02-27 PROCEDURE — 2500000002 HC RX 250 W HCPCS SELF ADMINISTERED DRUGS (ALT 637 FOR MEDICARE OP, ALT 636 FOR OP/ED): Performed by: FAMILY MEDICINE

## 2024-02-27 PROCEDURE — 2500000001 HC RX 250 WO HCPCS SELF ADMINISTERED DRUGS (ALT 637 FOR MEDICARE OP)

## 2024-02-27 PROCEDURE — 2500000005 HC RX 250 GENERAL PHARMACY W/O HCPCS: Performed by: INTERNAL MEDICINE

## 2024-02-27 PROCEDURE — 82947 ASSAY GLUCOSE BLOOD QUANT: CPT

## 2024-02-27 RX ADMIN — PANTOPRAZOLE SODIUM 40 MG: 40 TABLET, DELAYED RELEASE ORAL at 21:42

## 2024-02-27 RX ADMIN — LEVOTHYROXINE SODIUM 200 MCG: 100 TABLET ORAL at 05:07

## 2024-02-27 RX ADMIN — PANTOPRAZOLE SODIUM 40 MG: 40 TABLET, DELAYED RELEASE ORAL at 08:54

## 2024-02-27 RX ADMIN — IPRATROPIUM BROMIDE AND ALBUTEROL SULFATE 3 ML: 2.5; .5 SOLUTION RESPIRATORY (INHALATION) at 07:45

## 2024-02-27 RX ADMIN — POLYETHYLENE GLYCOL 3350 17 G: 17 POWDER, FOR SOLUTION ORAL at 08:54

## 2024-02-27 RX ADMIN — VENLAFAXINE HYDROCHLORIDE 75 MG: 75 CAPSULE, EXTENDED RELEASE ORAL at 08:53

## 2024-02-27 RX ADMIN — AMIODARONE HYDROCHLORIDE 200 MG: 200 TABLET ORAL at 08:53

## 2024-02-27 RX ADMIN — METOPROLOL SUCCINATE 50 MG: 50 TABLET, EXTENDED RELEASE ORAL at 08:53

## 2024-02-27 RX ADMIN — IPRATROPIUM BROMIDE AND ALBUTEROL SULFATE 3 ML: 2.5; .5 SOLUTION RESPIRATORY (INHALATION) at 19:59

## 2024-02-27 RX ADMIN — FERROUS GLUCONATE 324 MG: 324 TABLET ORAL at 08:54

## 2024-02-27 RX ADMIN — IPRATROPIUM BROMIDE AND ALBUTEROL SULFATE 3 ML: 2.5; .5 SOLUTION RESPIRATORY (INHALATION) at 15:51

## 2024-02-27 RX ADMIN — Medication 3 L/MIN: at 15:54

## 2024-02-27 RX ADMIN — ASPIRIN 81 MG: 81 TABLET, COATED ORAL at 08:53

## 2024-02-27 RX ADMIN — INSULIN LISPRO 6 UNITS: 100 INJECTION, SOLUTION INTRAVENOUS; SUBCUTANEOUS at 12:32

## 2024-02-27 RX ADMIN — INSULIN LISPRO 6 UNITS: 100 INJECTION, SOLUTION INTRAVENOUS; SUBCUTANEOUS at 08:56

## 2024-02-27 RX ADMIN — CLOPIDOGREL 75 MG: 75 TABLET ORAL at 08:54

## 2024-02-27 RX ADMIN — ATORVASTATIN CALCIUM 40 MG: 40 TABLET, FILM COATED ORAL at 08:54

## 2024-02-27 RX ADMIN — Medication 5 MG: at 21:42

## 2024-02-27 RX ADMIN — PREDNISONE 40 MG: 20 TABLET ORAL at 08:53

## 2024-02-27 RX ADMIN — METOPROLOL SUCCINATE 50 MG: 50 TABLET, EXTENDED RELEASE ORAL at 21:42

## 2024-02-27 RX ADMIN — INSULIN LISPRO 6 UNITS: 100 INJECTION, SOLUTION INTRAVENOUS; SUBCUTANEOUS at 17:03

## 2024-02-27 RX ADMIN — AMLODIPINE BESYLATE 5 MG: 5 TABLET ORAL at 08:53

## 2024-02-27 RX ADMIN — Medication 3 L/MIN: at 07:48

## 2024-02-27 ASSESSMENT — COGNITIVE AND FUNCTIONAL STATUS - GENERAL
TURNING FROM BACK TO SIDE WHILE IN FLAT BAD: A LOT
MOVING TO AND FROM BED TO CHAIR: TOTAL
WALKING IN HOSPITAL ROOM: TOTAL
STANDING UP FROM CHAIR USING ARMS: TOTAL
CLIMB 3 TO 5 STEPS WITH RAILING: TOTAL
MOBILITY SCORE: 8
TURNING FROM BACK TO SIDE WHILE IN FLAT BAD: A LOT
MOBILITY SCORE: 8
STANDING UP FROM CHAIR USING ARMS: TOTAL
HELP NEEDED FOR BATHING: A LOT
DRESSING REGULAR UPPER BODY CLOTHING: A LOT
MOVING FROM LYING ON BACK TO SITTING ON SIDE OF FLAT BED WITH BEDRAILS: A LOT
PERSONAL GROOMING: A LOT
WALKING IN HOSPITAL ROOM: TOTAL
MOVING FROM LYING ON BACK TO SITTING ON SIDE OF FLAT BED WITH BEDRAILS: A LOT
CLIMB 3 TO 5 STEPS WITH RAILING: TOTAL
TOILETING: A LOT
DRESSING REGULAR LOWER BODY CLOTHING: A LOT
DAILY ACTIVITIY SCORE: 13
EATING MEALS: A LITTLE
MOVING TO AND FROM BED TO CHAIR: TOTAL

## 2024-02-27 ASSESSMENT — PAIN - FUNCTIONAL ASSESSMENT
PAIN_FUNCTIONAL_ASSESSMENT: 0-10
PAIN_FUNCTIONAL_ASSESSMENT: 0-10

## 2024-02-27 ASSESSMENT — PAIN SCALES - GENERAL
PAINLEVEL_OUTOF10: 0 - NO PAIN

## 2024-02-27 NOTE — CARE PLAN
The patient's goals for the shift include      The clinical goals for the shift include pt will remain free of fall this shift    Over the shift, the patient did not make progress toward the following goals. Barriers to progression include patient remained safe all night. Recommendations to address these barriers include continue with plan of care.

## 2024-02-27 NOTE — PROGRESS NOTES
02/27/24 1253   Discharge Planning   Living Arrangements Other (Comment)   Support Systems Friends/neighbors   Type of Residence Private residence   Number of Stairs to Enter Residence 1   Number of Stairs Within Residence 4   Do you have animals or pets at home? Yes   Type of Animals or Pets 1 cat inside and 1 cat outside   Who is requesting discharge planning? Provider   Home or Post Acute Services Post acute facilities (Rehab/SNF/etc)   Type of Post Acute Facility Services Rehab   Type of Home Care Services Other (Comment)  (None, to go to SNF)   Patient expects to be discharged to: Pt now agreeable to go to [facility] with transportation to [facility] for dialysis.  [facility] not yet confirmed.  Confirmed [facility] will set up transportation to and from dialysis.  Pt's cat allowed to visit pt at [facility].

## 2024-02-27 NOTE — PROGRESS NOTES
Occupational Therapy                 Therapy Communication Note    Patient Name: Daphne Morris  MRN: [id number]  Today's Date: 2/27/2024     Discipline: Occupational Therapy    Missed Visit Reason: Other (Comment) (Pt awaiting dialyisis catheter placement, held at nurse recommendation.)    Missed Time: Attempt

## 2024-02-27 NOTE — PROGRESS NOTES
Daphne Morris is a 68 y.o. female on day 7 of admission presenting with Heart failure (CMS/HCC).      Subjective   Patient is siting up in bed in no acute distress, she is awaiting tunneled line with IR.    Objective     Last Recorded Vitals  /74 (BP Location: Right arm, Patient Position: Lying)   Pulse 67   Temp 36.5 °C (97.7 °F) (Temporal)   Resp 18   Wt 98.5 kg (217 lb 2.5 oz)   SpO2 98%   Intake/Output last 3 Shifts:    Intake/Output Summary (Last 24 hours) at 2/26/2024 2245  Last data filed at 2/26/2024 1800  Gross per 24 hour   Intake 480 ml   Output 100 ml   Net 380 ml     Admission Weight  Weight: 95.4 kg (210 lb 5.1 oz) (02/19/24 0438)    Daily Weight  02/22/24 : 98.5 kg (217 lb 2.5 oz)    Image Results  ECG 12 Lead  Junctional rhythm  Left axis deviation  Inferior infarct , age undetermined  Anterolateral infarct (cited on or before 01-FEB-2024)  Abnormal ECG  When compared with ECG of 01-FEB-2024 02:46,  Junctional rhythm has replaced Sinus rhythm  Questionable change in initial forces of Lateral leads  See ED provider note for full interpretation and clinical correlation  Confirmed by Rose Viera ([id number]) on 2/24/2024 5:48:25 PM      Physical Exam  Constitutional: patient is alert and cooperative with exam  skin: dry and warm  Eyes: EOMI and clear sclera  ENMT: moist mucous membranes  Head/Neck: neck supple   Respiratory/Thorax: Clear to auscultation bilaterally, no wheezing or crackles appreciated  Cardiovascular: regular rate and rhythm, S1 and S2 present, no murmurs heard  Gastrointestinal: bowel sounds present, no pain or tenderness upon palpation, soft and nondistended  Extremities: +2 radial, posterior tibial, and pedal pulse, no lower extremity edema noted  Neurological: AxOx3    Relevant Results  Scheduled medications  amiodarone, 200 mg, oral, Daily with breakfast  amLODIPine, 5 mg, oral, Daily  aspirin, 81 mg, oral, Daily  atorvastatin, 40 mg, oral, Daily  clopidogrel, 75 mg, oral,  Daily  ferrous gluconate, 324 mg, oral, Daily with breakfast  [Held by provider] furosemide, 80 mg, oral, BID  [Held by provider] heparin (porcine), 5,000 Units, subcutaneous, q8h  [Held by provider] insulin glargine, 14 Units, subcutaneous, Nightly  insulin lispro, 0-15 Units, subcutaneous, TID with meals  ipratropium-albuteroL, 3 mL, nebulization, TID  levothyroxine, 200 mcg, oral, Daily before breakfast  lidocaine, 5 mL, infiltration, Once  melatonin, 5 mg, oral, Daily  metoprolol succinate XL, 50 mg, oral, BID  pantoprazole, 40 mg, oral, BID  polyethylene glycol, 17 g, oral, Daily  potassium chloride CR, 20 mEq, oral, Daily  predniSONE, 40 mg, oral, Daily  venlafaxine XR, 75 mg, oral, Daily      Continuous medications  [Held by provider] furosemide, 10 mg/hr, Last Rate: 10 mg/hr (02/22/24 1927)      PRN medications  PRN medications: acetaminophen **OR** acetaminophen **OR** acetaminophen, acetaminophen **OR** acetaminophen **OR** acetaminophen, albuterol, dextrose 10 % in water (D10W), dextrose, diphenhydrAMINE, glucagon, ipratropium-albuteroL, ondansetron **OR** ondansetron, oxygen, oxygen, vancomycin    Results for orders placed or performed during the hospital encounter of 02/19/24 (from the past 24 hour(s))   Vancomycin   Result Value Ref Range    Vancomycin 15.5 5.0 - 20.0 ug/mL   Coagulation Screen   Result Value Ref Range    Protime 11.4 9.8 - 12.8 seconds    INR 1.0 0.9 - 1.1    aPTT 24 (L) 27 - 38 seconds   Lavender Top   Result Value Ref Range    Extra Tube Hold for add-ons.    POCT GLUCOSE   Result Value Ref Range    POCT Glucose 168 (H) 74 - 99 mg/dL   POCT GLUCOSE   Result Value Ref Range    POCT Glucose 179 (H) 74 - 99 mg/dL   POCT GLUCOSE   Result Value Ref Range    POCT Glucose 202 (H) 74 - 99 mg/dL   POCT GLUCOSE   Result Value Ref Range    POCT Glucose 227 (H) 74 - 99 mg/dL       Assessment/Plan      Principal Problem:    Heart failure (CMS/[facility])    Daphne Morris is a 68 y.o. female presenting  with past medical history of heart failure, atrial fibrillation, Hypertension, type 2 DM transfer from [facility] due to acute hypoxic respiratory failure secondary to pneumonia, COPD exacerbation and acute diastolic heart failure.     #Acute hypoxic respiratory failure secondary to acute on chronic diastolic heart failure and influenza B pneumonia  -Cardiology on board, unable to do left heart cath due to elevated creatinine, unable to start SGLT2 inhibitors as well  -Pulmonary on board plan to continue prednisone, breathing treatment, patient finished Tamiflu    #Stage III/IV chronic kidney disease  Status post right nephrectomy  Nephrology on board, family are agreeable with hemodialysis, unable to get a dialysis catheter over the weekend,   - consult IR pending line placement for HD start    #Anemia  -Status post 1 unit of blood   -FOBT positive  -A iron saturation of 11 iron level of 24 ferritin in 200  -On ferrous gluconate      #Paroxysmal atrial fibrillation  Status post watchman in August 2023  Currently in sinus rhythm  Continue amiodarone and metoprolol     #Type 2 diabetes mellitus  Continue insulin sliding scale  Holding Lantus due to limited oral intake  Holding Januvia  Continue diabetic diet  Follow Accu-Cheks     #Hypertension  Metoprolol      #GERD  -PPI     #yperlipidemia  Atorvastatin     #Depression  Venlafaxine     #Hypothyroidism  Continue Synthroid     #DVT prophylaxis  Heparin     #Disposition  -Patient met with hospice, the patient and her family are not ready to sign with hospice right now.  -CODE STATUS changed to DNR/DNI  -Nephrology discussed with the family hemodialysis and they were agreeable  -Plan to consult IR to get dialysis catheter and to start hemodialysis     Diomedes Peters, DO

## 2024-02-27 NOTE — PROGRESS NOTES
Palliative Care Social Work Note     Met with pt.  Pt reports she is doing okay, tired.  Discussed hospice meeting.  Pt states it went okay, has decided to start dialysis.  Pt states that she wants to dc home.  Discussed that pt requires too much assist to dc home.  Discussed dc to NH with transport to dialysis vs NH with onsite dialysis.  Pt has no opinion.  TCC and SW updated.

## 2024-02-27 NOTE — PROGRESS NOTES
"Subjective   Patient seen this morning. She has no new complaints. She states that she is open to dialysis therapy     Objective   /63 (BP Location: Left arm, Patient Position: Lying) Comment (BP Location): lower  Pulse 62   Temp 37.1 °C (98.8 °F) (Temporal)   Resp 16   Ht 1.676 m (5' 6\")   Wt 98.5 kg (217 lb 2.5 oz)   SpO2 96%   BMI 35.05 kg/m²   Wt Readings from Last 3 Encounters:   02/22/24 98.5 kg (217 lb 2.5 oz)   02/18/24 102 kg (224 lb 1.6 oz)   02/15/24 95.8 kg (211 lb 3.2 oz)       Physical Exam    Intake/Output Summary (Last 24 hours) at 2/27/2024 1142  Last data filed at 2/27/2024 0900  Gross per 24 hour   Intake 440 ml   Output 500 ml   Net -60 ml      General appearance: no distress awake and alert on room air, euvolemic on exam  Eyes: non-icteric  HEENT: atrumatic head, PEERLA, moist mucosa  Skin: no apparent rash, Heart: NSR, S1, S2 normal, no murmur or gallop  Lungs: Symmetrical expansion, decreased air entry bilaterally with basal crackles   Abdomen: soft, nt/nd, obese  Extremities: bilateral upper extremity edema with bilateral lower extremity swelling. Extremity tender to touch bu no inflammation   Neuro: No FND,asterixis, no focal deficits noticed. Has flapping tremors     Review of Systems     Constitutional: no fever, no chills, no recent weight gain and no recent weight loss.   Eyes: no blurred vision and no diplopia.   ENT: no hearing loss, no earache, no sore throat, no swollen glands in the neck and no nasal discharge.   Cardiovascular: no chest pain, no palpitations and no lower extremity edema.   Respiratory: no shortness of breath, no chronic cough and no shortness of breath during exertion.   Gastrointestinal: no abdominal pain, no constipation, no heartburn, no vomiting, no bloody stools and no change in bowel movements.   Genitourinary: no dysuria and no hematuria.   Musculoskeletal: no arthralgias and no myalgias.   Skin: no rashes and no skin lesions.   Neurological: no " headaches and no dizziness.   Psychiatric: no confusion, no depression and no anxiety.   Endocrine: no heat intolerance, no cold intolerance, appetite not increased, no thyroid disorder, no increased urinary frequency and no dry skin.   Hematologic/Lymphatic: does not bleed easily and does not bruise easily.   All other systems have been reviewed and are negative for complaint.     Data Review  Results from last 7 days   Lab Units 02/27/24  0542 02/25/24  0610 02/24/24  0507   WBC AUTO x10*3/uL 11.4* 7.6 6.2   HEMOGLOBIN g/dL 7.6* 7.9* 7.9*   HEMATOCRIT % 25.0* 26.1* 25.5*   PLATELETS AUTO x10*3/uL 338 349 325      Lab Results   Component Value Date    HGBA1C 7.9 (H) 02/16/2024     Results from last 7 days   Lab Units 02/27/24  0542 02/25/24  0610 02/24/24  0507 02/23/24  0533 02/22/24  0454 02/21/24  0614   SODIUM mmol/L 135* 137 133* 133* 132* 134*   POTASSIUM mmol/L 4.8 5.2 4.9 4.9 5.1 5.0   CHLORIDE mmol/L 97* 94* 94* 94* 94* 96*   CO2 mmol/L 27 30 29 27 29 29   BUN mg/dL 106* 102* 88* 82* 78* 69*   GLUCOSE mg/dL 266* 196* 251* 217* 267* 179*   CALCIUM mg/dL 7.8* 7.8* 7.6* 7.8* 7.5* 7.6*   ANION GAP mmol/L 16 18 15 17 14 14   CREATININE mg/dL 3.89* 4.08* 4.09* 3.93* 3.75* 3.51*   EGFR mL/min/1.73m*2 12* 11* 11* 12* 13* 14*     Albumin/Creatine Ratio   Date Value Ref Range Status   02/01/2024   Final     Comment:     One or more analytes used in this calculation is outside of the analytical measurement range. Calculation cannot be performed.     RFP  Recent Labs     02/27/24  0542 02/25/24  0610 02/24/24  0507 02/23/24  0533 02/22/24  0454 02/21/24  0614 02/20/24  0604   * 137 133* 133* 132* 134* 136   K 4.8 5.2 4.9 4.9 5.1 5.0 4.8   CL 97* 94* 94* 94* 94* 96* 97*   CO2 27 30 29 27 29 29 30   * 102* 88* 82* 78* 69* 63*   CREATININE 3.89* 4.08* 4.09* 3.93* 3.75* 3.51* 3.20*   GLUCOSE 266* 196* 251* 217* 267* 179* 63*   CALCIUM 7.8* 7.8* 7.6* 7.8* 7.5* 7.6* 7.5*   PHOS 6.0* 5.5* 5.5* 5.5* 6.0* 5.8* 4.8    EGFR 12* 11* 11* 12* 13* 14* 15*   ANIONGAP 16 18 15 17 14 14 14        Urineanalysis  Recent Labs     02/19/24  1543 02/09/24  1248 02/01/24 0515 07/19/23  1437 04/26/23  1740 05/03/21 2035 04/23/21 0310 04/06/21  1200 04/10/18  1415   COLORU Yellow Yellow Yellow STRAW YELLOW YELLOW YELLOW PALE YELLOW YELLOW   APPEARANCEU Hazy* Clear Clear CLEAR CLEAR CLEAR HAZY HAZY CLOUDY   SPECGRAVU 1.016 1.011 1.013 1.008 1.009 1.012 1.020 1.005 1.017   SYLVIE 5.0 5.0 5.0 6.0 6.0 6.5 6.0 6.5 5.5   PROTUR >=500 (3+)* >=500 (3+)* >=500 (3+)* >=500 (3+)* 100(2+)* 300* 600* 100* 30*   GLUCOSEU 50 (1+)* 150 (2+)* >=500 (3+)* 150 (2+)* 50(1+)* 100* 150* 50* 300*   BLOODU NEGATIVE MODERATE (2+)* NEGATIVE LARGE (3+)* SMALL(1+)* TRACE* TRACE* MODERATE* TRACE*   KETONESU 5 (TRACE)* NEGATIVE NEGATIVE NEGATIVE NEGATIVE NEGATIVE NEGATIVE NEGATIVE NEGATIVE   BILIRUBINU NEGATIVE NEGATIVE NEGATIVE NEGATIVE NEGATIVE NEGATIVE NEGATIVE NEGATIVE NEGATIVE   NITRITEU NEGATIVE NEGATIVE NEGATIVE NEGATIVE NEGATIVE NEGATIVE NEGATIVE POSITIVE* NEGATIVE   LEUKOCYTESU NEGATIVE TRACE* NEGATIVE NEGATIVE NEGATIVE NEGATIVE SMALL* LARGE* LARGE*       Urine Electrolytes  Recent Labs     02/19/24  1543 02/09/24  1248 02/01/24 0515 07/19/23 1437 04/30/23  1316 04/26/23  1740 05/03/21 2035 04/23/21  0310 04/06/21  1200 04/10/18  1415   SODIUMURR  --   --  40  --  27  --   --   --   --   --    NACREATUR  --   --  75  --  27  --   --   --   --   --    KUR  --   --  64  --   --   --   --   --   --   --    KCREATUR  --   --  119  --   --   --   --   --   --   --    CREATU  --   --  53.6  53.6  --  101.0  --   --   --   --   --    PROTUR >=500 (3+)* >=500 (3+)* >=500 (3+)* >=500 (3+)*  --  100(2+)* 300* 600* 100* 30*   ALBUMINUR  --   --  >2,250.0  --   --   --   --   --   --   --         Urine Micro  Recent Labs     02/19/24  1543 02/09/24  1248 02/01/24  0515 07/19/23  1437 04/26/23  1740 05/03/21  2035 04/23/21  0310 04/06/21  1200 04/10/18  1415   WBCU 1-5  6-10* 1-5 5* 1 NONE SEEN 53* LOADED 1,077*   RBCU 1-2 11-20* 3-5 >182* 1 2-4 5* 16-25 5*   HYALCASTU  --  2+* OCCASIONAL*  --   --   --  70  --  3   SQUAMEPIU 1-9 (SPARSE) 1-9 (SPARSE)  --  1 1  --  MODERATE  --  FEW   BACTERIAU 1+* 1+* 1+* 1+*  --   --  POSITIVE PRESENT  Performed at [facility] [address] [facility] [address]   POSITIVE   MUCUSU  --  FEW FEW FEW  --   --   --   --   --         Iron  Recent Labs     02/20/24  0604 05/02/23  0607 04/28/23  0615 04/27/23  0624 02/23/23  1113   IRON 24*  --   --  26* 54   TIBC 214*  --   --  289 281   IRONSAT 11*  --   --  9* 19.2   FERRITIN 234* 71 80  --  42        amiodarone, 200 mg, oral, Daily with breakfast  amLODIPine, 5 mg, oral, Daily  aspirin, 81 mg, oral, Daily  atorvastatin, 40 mg, oral, Daily  clopidogrel, 75 mg, oral, Daily  ferrous gluconate, 324 mg, oral, Daily with breakfast  [Held by provider] heparin (porcine), 5,000 Units, subcutaneous, q8h  [Held by provider] insulin glargine, 14 Units, subcutaneous, Nightly  insulin lispro, 0-15 Units, subcutaneous, TID with meals  ipratropium-albuteroL, 3 mL, nebulization, TID  levothyroxine, 200 mcg, oral, Daily before breakfast  lidocaine, 5 mL, infiltration, Once  melatonin, 5 mg, oral, Daily  metoprolol succinate XL, 50 mg, oral, BID  pantoprazole, 40 mg, oral, BID  polyethylene glycol, 17 g, oral, Daily  potassium chloride CR, 20 mEq, oral, Daily  predniSONE, 40 mg, oral, Daily  venlafaxine XR, 75 mg, oral, Daily      Assessment and Plan   Daphne Morris  is a 68 y.o. female who has past medical history of  heart failure, atrial fibrillation s/p Watchman, right nephrectomy, hypertension, type 2 DM admitted for acute hypoxic respiratory failure 2/2 to pneumonia and acute decompensated heart failure.        # ANDREA on CKD   -Likely  Cardiorenal syndrome 2/2 Acute decompensated CHF  -(CKD) 4/A3-baseline SCr 1.8-2.5 most likely atherosclerotic cardiovascular disease/diabetic nephropathy, prior  nephrectomy.  She follows with Dr. Mitchell  - Peaked Scr during this admission 3-4.09, GFR 11   -Kidney ultrasound with no obstruction on the left side.  Right total nephrectomy noticed     Plan  -Patient will need intermittent hemodialysis, on account of persistently worsening renal function, refractory to diuretic therapy.  -Long discussion with patient on need to dialyze, she was also informed that although dialysis may increase life expectancy, may not necessarily improve quality of life.  -Discussed with patient's primary nephrologist Dr. Harris, who is agreeable to plan for IHD.  And in the event status patient consents to dialysis, subsequent dialysis sessions would be at [facility]  - Patient is agreeable to dialysis   -Obtain tunneled dialysis catheter (IR consult)

## 2024-02-27 NOTE — PROGRESS NOTES
Daphne Morris is a 68 y.o. female on day 8 of admission presenting with Heart failure (CMS/HCC).      Subjective   Seen and examined, no new complaints.  No overnight events.  The plan discussed with the patient and RN.    Objective     Last Recorded Vitals  /63 (BP Location: Left arm, Patient Position: Lying) Comment (BP Location): lower  Pulse 62   Temp 37.1 °C (98.8 °F) (Temporal)   Resp 16   Wt 98.5 kg (217 lb 2.5 oz)   SpO2 97%   Intake/Output last 3 Shifts:    Intake/Output Summary (Last 24 hours) at 2/27/2024 1606  Last data filed at 2/27/2024 1459  Gross per 24 hour   Intake 680 ml   Output 750 ml   Net -70 ml       Admission Weight  Weight: 95.4 kg (210 lb 5.1 oz) (02/19/24 0438)    Daily Weight  02/22/24 : 98.5 kg (217 lb 2.5 oz)    Image Results  ECG 12 Lead  Junctional rhythm  Left axis deviation  Inferior infarct , age undetermined  Anterolateral infarct (cited on or before 01-FEB-2024)  Abnormal ECG  When compared with ECG of 01-FEB-2024 02:46,  Junctional rhythm has replaced Sinus rhythm  Questionable change in initial forces of Lateral leads  See ED provider note for full interpretation and clinical correlation  Confirmed by Rose Viera ([id number]) on 2/24/2024 5:48:25 PM      Physical Exam  Constitutional: patient is alert and cooperative with exam  skin: dry and warm  Eyes: EOMI and clear sclera  ENMT: moist mucous membranes  Head/Neck: neck supple   Respiratory/Thorax: Clear to auscultation bilaterally, no wheezing or crackles appreciated  Cardiovascular: regular rate and rhythm, S1 and S2 present, no murmurs heard  Gastrointestinal: bowel sounds present, no pain or tenderness upon palpation, soft and nondistended  Extremities: +2 radial, posterior tibial, and pedal pulse, no lower extremity edema noted  Neurological: AxOx3    Relevant Results  Scheduled medications  amiodarone, 200 mg, oral, Daily with breakfast  amLODIPine, 5 mg, oral, Daily  aspirin, 81 mg, oral, Daily  atorvastatin,  40 mg, oral, Daily  clopidogrel, 75 mg, oral, Daily  ferrous gluconate, 324 mg, oral, Daily with breakfast  [Held by provider] heparin (porcine), 5,000 Units, subcutaneous, q8h  [Held by provider] insulin glargine, 14 Units, subcutaneous, Nightly  insulin lispro, 0-15 Units, subcutaneous, TID with meals  ipratropium-albuteroL, 3 mL, nebulization, TID  levothyroxine, 200 mcg, oral, Daily before breakfast  lidocaine, 5 mL, infiltration, Once  melatonin, 5 mg, oral, Daily  metoprolol succinate XL, 50 mg, oral, BID  pantoprazole, 40 mg, oral, BID  polyethylene glycol, 17 g, oral, Daily  potassium chloride CR, 20 mEq, oral, Daily  predniSONE, 40 mg, oral, Daily  venlafaxine XR, 75 mg, oral, Daily      Continuous medications  [Held by provider] furosemide, 10 mg/hr, Last Rate: 10 mg/hr (02/22/24 1927)      PRN medications  PRN medications: acetaminophen **OR** acetaminophen **OR** acetaminophen, acetaminophen **OR** acetaminophen **OR** acetaminophen, albuterol, dextrose 10 % in water (D10W), dextrose, diphenhydrAMINE, glucagon, ipratropium-albuteroL, ondansetron **OR** ondansetron, oxygen, oxygen, vancomycin    Results for orders placed or performed during the hospital encounter of 02/19/24 (from the past 24 hour(s))   POCT GLUCOSE   Result Value Ref Range    POCT Glucose 227 (H) 74 - 99 mg/dL   CBC   Result Value Ref Range    WBC 11.4 (H) 4.4 - 11.3 x10*3/uL    nRBC 0.0 0.0 - 0.0 /100 WBCs    RBC 2.55 (L) 4.00 - 5.20 x10*6/uL    Hemoglobin 7.6 (L) 12.0 - 16.0 g/dL    Hematocrit 25.0 (L) 36.0 - 46.0 %    MCV 98 80 - 100 fL    MCH 29.8 26.0 - 34.0 pg    MCHC 30.4 (L) 32.0 - 36.0 g/dL    RDW 13.7 11.5 - 14.5 %    Platelets 338 150 - 450 x10*3/uL   Renal Function Panel   Result Value Ref Range    Glucose 266 (H) 74 - 99 mg/dL    Sodium 135 (L) 136 - 145 mmol/L    Potassium 4.8 3.5 - 5.3 mmol/L    Chloride 97 (L) 98 - 107 mmol/L    Bicarbonate 27 21 - 32 mmol/L    Anion Gap 16 10 - 20 mmol/L    Urea Nitrogen 106 (HH) 6 - 23  mg/dL    Creatinine 3.89 (H) 0.50 - 1.05 mg/dL    eGFR 12 (L) >60 mL/min/1.73m*2    Calcium 7.8 (L) 8.6 - 10.3 mg/dL    Phosphorus 6.0 (H) 2.5 - 4.9 mg/dL    Albumin 2.6 (L) 3.4 - 5.0 g/dL   Magnesium   Result Value Ref Range    Magnesium 1.98 1.60 - 2.40 mg/dL   POCT GLUCOSE   Result Value Ref Range    POCT Glucose 247 (H) 74 - 99 mg/dL   POCT GLUCOSE   Result Value Ref Range    POCT Glucose 223 (H) 74 - 99 mg/dL       Assessment/Plan      Principal Problem:    Heart failure (CMS/[facility])    Daphne Morris is a 68 y.o. female presenting with past medical history of heart failure, atrial fibrillation, Hypertension, type 2 DM transfer from [facility] ER due to acute hypoxic respiratory failure secondary to pneumonia, COPD exacerbation and acute diastolic heart failure.     #Acute hypoxic respiratory failure secondary to acute on chronic diastolic heart failure and influenza B pneumonia  -Cardiology on board, unable to do left heart cath due to elevated creatinine, unable to start SGLT2 inhibitors as well  -Pulmonary on board plan to continue prednisone, breathing treatment, patient finished Tamiflu    #Stage III/IV chronic kidney disease  Status post right nephrectomy  Nephrology on board, family are agreeable with hemodialysis, unable to get a dialysis catheter over the weekend,   - consult IR pending line placement for HD start    #Anemia  -Status post 1 unit of blood   -FOBT positive  -A iron saturation of 11 iron level of 24 ferritin in 200  -On ferrous gluconate      #Paroxysmal atrial fibrillation  Status post watchman in August 2023  Currently in sinus rhythm  Continue amiodarone and metoprolol     #Type 2 diabetes mellitus  Continue insulin sliding scale  Holding Lantus due to limited oral intake  Holding Januvia  Continue diabetic diet  Follow Accu-Cheks     #Hypertension  Metoprolol      #GERD  -PPI     #yperlipidemia  Atorvastatin     #Depression  Venlafaxine     #Hypothyroidism  Continue Synthroid     #DVT  prophylaxis  Heparin     #Disposition  -Patient met with hospice, the patient and her family are not ready to sign with hospice right now.  -CODE STATUS changed to DNR/DNI  -Nephrology discussed with the family hemodialysis and they were agreeable  -Plan to consult IR to get dialysis catheter and to start hemodialysis     Myke Newsome MD

## 2024-02-27 NOTE — PROGRESS NOTES
Physical Therapy    Physical Therapy Treatment    Patient Name: Daphne Morris  MRN: [id number]  Today's Date: 2/27/2024  Time Calculation  Start Time: 1303  Stop Time: 1316  Time Calculation (min): 13 min       Assessment/Plan   PT Assessment  PT Assessment Results: Decreased strength, Decreased endurance, Impaired balance, Decreased mobility  Rehab Prognosis: Fair  Barriers to Discharge: Decreased activity tolerance  Evaluation/Treatment Tolerance: Patient limited by fatigue  Medical Staff Made Aware: Yes  Strengths: Support of Caregivers, Ability to acquire knowledge  Barriers to Participation: Comorbidities  End of Session Communication: Bedside nurse  Assessment Comment: Patient with decreased activity tolerance, declines to complete bed mobility this date however she is awaiting dialysis line placement and to begin those treatments. Will continue to assess if patient will be able to tolerate mod intensity intervention.  End of Session Patient Position: Bed, 3 rail up, Alarm on  PT Plan  Inpatient/Swing Bed or Outpatient: Inpatient  PT Plan  Treatment/Interventions: Bed mobility, Transfer training, Balance training, Strengthening, Endurance training  PT Plan: Skilled PT  PT Frequency: 3 times per week  PT Discharge Recommendations: Moderate intensity level of continued care  PT Recommended Transfer Status: Assist x2  PT - OK to Discharge: Yes (per PT POC)      General Visit Information:   PT  Visit  PT Received On: 02/27/24  Response to Previous Treatment: Patient with no complaints from previous session.  General  Reason for Referral: Impaired functional mobility; (+) flu  Referred By: Myke Newsome MD  Past Medical History Relevant to Rehab: heart failure with preserved ejection fraction, chronic respiratory failure secondary to CHF/COPD/pleural effusion 2 L of oxygen at baseline, history of A-fib, hypertension, prior CVA anemia, CKD stage IV, DM type II, anemia, hypothyroidism, and pericardial  "effusion who presented to the ED from her nursing facility with concern of dyspnea and hypoxia, patient was recently admitted to [facility] on 02/01-02/15 for AHRF, COPDae, ANDREA/cardiorenal syndrome she received Lasix and underwent right thoracentesis as well as diagnostic right heart cath which showed mild pulmonary hypertension  Family/Caregiver Present: No  Prior to Session Communication: Bedside nurse  Patient Position Received: Bed, 3 rail up, Alarm on  General Comment: Patient agreeable to PT tx with encouragement. Patient has met with hospice and has elected not to move fwd with services. Patient is going to proceed with dialysis tx, awaiting line placement.    Subjective   Precautions:  Precautions  Medical Precautions: Fall precautions (3L O2, tele, heel lift boots)  Vital Signs:  Vital Signs  Heart Rate: 62  Heart Rate Source: Monitor  SpO2: 96 %    Objective   Pain:  Pain Assessment  Pain Assessment: 0-10  Pain Score: 0 - No pain  Cognition:  Cognition  Overall Cognitive Status: Within Functional Limits  Orientation Level: Oriented X4    Activity Tolerance:  Activity Tolerance  Endurance: Tolerates less than 10 min exercise, no significant change in vital signs  Treatments:  Therapeutic Exercise  Therapeutic Exercise Performed: Yes  Therapeutic Exercise Activity 1: supine: ankle pumps B x 20; quad sets with 2\" hold x 20; glut sets with 2\" hold x 20; hip abd/add B x 10    Bed Mobility  Bed Mobility: No (Patient declines to complete however states her goal is to sit up independently.)    Outcome Measures:  [facility] Basic Mobility  Turning from your back to your side while in a flat bed without using bedrails: A lot  Moving from lying on your back to sitting on the side of a flat bed without using bedrails: A lot  Moving to and from bed to chair (including a wheelchair): Total  Standing up from a chair using your arms (e.g. wheelchair or bedside chair): Total  To walk in hospital room: Total  Climbing " 3-5 steps with railing: Total  Basic Mobility - Total Score: 8      Encounter Problems       Encounter Problems (Active)       Balance       STG - Maintains static standing balance with upper extremity support x 1' with max A  (Progressing)       Start:  02/21/24    Expected End:  02/29/24            STG - Maintains dynamic sitting balance without upper extremity support x 10'  (Progressing)       Start:  02/21/24    Expected End:  02/29/24               Pain - Adult          Transfers       STG - Patient will perform bed mobility min A  (Progressing)       Start:  02/21/24    Expected End:  02/29/24            STG - Patient will transfer sit to and from stand max A  (Progressing)       Start:  02/21/24    Expected End:  02/29/24

## 2024-02-27 NOTE — PROGRESS NOTES
"Daphne Morris is a 68 y.o. female on day 8 of admission presenting with Heart failure (CMS/HCC).      Subjective   She is sitting up in the bed, more awake, states shortness of breath is improved. Still with generalized edema.       Review of systems:  Constitutional: negative for fever, chills, or malaise  Neuro: negative for dizziness, headache, numbness, tingling  ENT: Negative for nasal congestion or sore throat  CV: negative for chest pain, palpitations  GI: negative for abd pain, nausea, vomiting or diarrhea  : negative for dysuria, frequency, or urgency  Skin: negative for lesions, wounds, or rash  Musculoskeletal: Negative for myalgia, or arthralgia  Endocrine: Negative for polyuria or polydipsia         Objective   Constitutional: Well developed, alert and oriented x3, no distress, cooperative  Eyes: PERRL, EOMI, clear sclera  ENMT: mucous membranes moist, no apparent injury, no lesions seen  Head/Neck: Neck supple, no apparent injury, thyroid without mass or tenderness, No JVD, trachea midline, no bruits  Respiratory/Thorax: Patent airways, diminished throughout with good chest expansion, thorax symmetric  Cardiovascular: Regular, rate and rhythm, no murmurs, 2+ equal pulses of the extremities, normal S 1and S 2  Gastrointestinal: Nondistended, soft, non-tender, no rebound tenderness or guarding, no masses palpable, no organomegaly, +BS, no bruits  Musculoskeletal: ROM intact, no joint swelling, normal strength  Extremities: generalized edema  Neurological: Alert and oriented x3  Lymphatic: No significant lymphadenopathy  Skin: Warm and dry, no lesions, no rashes      Last Recorded Vitals  /63 (BP Location: Left arm, Patient Position: Lying) Comment (BP Location): lower  Pulse 62   Temp 37.1 °C (98.8 °F) (Temporal)   Resp 16   Ht 1.676 m (5' 6\")   Wt 98.5 kg (217 lb 2.5 oz)   SpO2 97%   BMI 35.05 kg/m²     Intake/Output last 3 Shifts:  I/O last 3 completed shifts:  In: 480 (4.9 mL/kg) " [P.O.:480]  Out: 600 (6.1 mL/kg) [Urine:600 (0.2 mL/kg/hr)]  Weight: 98.5 kg   I/O this shift:  In: 440 [P.O.:440]  Out: 250 [Urine:250]    Relevant Results  Scheduled medications  amiodarone, 200 mg, oral, Daily with breakfast  amLODIPine, 5 mg, oral, Daily  aspirin, 81 mg, oral, Daily  atorvastatin, 40 mg, oral, Daily  clopidogrel, 75 mg, oral, Daily  ferrous gluconate, 324 mg, oral, Daily with breakfast  [Held by provider] heparin (porcine), 5,000 Units, subcutaneous, q8h  [Held by provider] insulin glargine, 14 Units, subcutaneous, Nightly  insulin lispro, 0-15 Units, subcutaneous, TID with meals  ipratropium-albuteroL, 3 mL, nebulization, TID  levothyroxine, 200 mcg, oral, Daily before breakfast  lidocaine, 5 mL, infiltration, Once  melatonin, 5 mg, oral, Daily  metoprolol succinate XL, 50 mg, oral, BID  pantoprazole, 40 mg, oral, BID  polyethylene glycol, 17 g, oral, Daily  potassium chloride CR, 20 mEq, oral, Daily  predniSONE, 40 mg, oral, Daily  venlafaxine XR, 75 mg, oral, Daily      Continuous medications  [Held by provider] furosemide, 10 mg/hr, Last Rate: 10 mg/hr (02/22/24 1927)      PRN medications  PRN medications: acetaminophen **OR** acetaminophen **OR** acetaminophen, acetaminophen **OR** acetaminophen **OR** acetaminophen, albuterol, dextrose 10 % in water (D10W), dextrose, diphenhydrAMINE, glucagon, ipratropium-albuteroL, ondansetron **OR** ondansetron, oxygen, oxygen, vancomycin    Results for orders placed or performed during the hospital encounter of 02/19/24 (from the past 24 hour(s))   POCT GLUCOSE   Result Value Ref Range    POCT Glucose 227 (H) 74 - 99 mg/dL   CBC   Result Value Ref Range    WBC 11.4 (H) 4.4 - 11.3 x10*3/uL    nRBC 0.0 0.0 - 0.0 /100 WBCs    RBC 2.55 (L) 4.00 - 5.20 x10*6/uL    Hemoglobin 7.6 (L) 12.0 - 16.0 g/dL    Hematocrit 25.0 (L) 36.0 - 46.0 %    MCV 98 80 - 100 fL    MCH 29.8 26.0 - 34.0 pg    MCHC 30.4 (L) 32.0 - 36.0 g/dL    RDW 13.7 11.5 - 14.5 %    Platelets  338 150 - 450 x10*3/uL   Renal Function Panel   Result Value Ref Range    Glucose 266 (H) 74 - 99 mg/dL    Sodium 135 (L) 136 - 145 mmol/L    Potassium 4.8 3.5 - 5.3 mmol/L    Chloride 97 (L) 98 - 107 mmol/L    Bicarbonate 27 21 - 32 mmol/L    Anion Gap 16 10 - 20 mmol/L    Urea Nitrogen 106 (HH) 6 - 23 mg/dL    Creatinine 3.89 (H) 0.50 - 1.05 mg/dL    eGFR 12 (L) >60 mL/min/1.73m*2    Calcium 7.8 (L) 8.6 - 10.3 mg/dL    Phosphorus 6.0 (H) 2.5 - 4.9 mg/dL    Albumin 2.6 (L) 3.4 - 5.0 g/dL   Magnesium   Result Value Ref Range    Magnesium 1.98 1.60 - 2.40 mg/dL   POCT GLUCOSE   Result Value Ref Range    POCT Glucose 247 (H) 74 - 99 mg/dL   POCT GLUCOSE   Result Value Ref Range    POCT Glucose 223 (H) 74 - 99 mg/dL       XR chest 1 view   Final Result   1. Large right pleural effusion similar to recent prior studies.   2. Pulmonary venous hypertension.   3. Cardiomegaly.   4. Probable left lower lobe infiltrate or atelectasis.                  MACRO:   None        Signed by: Mario Sampson 2/21/2024 11:57 AM   Dictation workstation:   [id number]      NM Lung perfusion with spect/ct   Final Result   There are no segmental or subsegmental perfusion defects in both   lungs indicating low probability for acute pulmonary embolism.   Moderate-sized right pleural effusion.             The interpretation above is based on modified PISAPED criteria.        This study was analyzed and interpreted at [place], [place].        Signed by: Rolando Dillon 2/19/2024 10:38 AM   Dictation workstation:   [id number]      Bedside Midline Imaging    (Results Pending)   Consult to Interventional Radiology    (Results Pending)       Transthoracic Echo (TTE) Complete    Result Date: 2/5/2024   [facility], [id number] [place] [address]               Tel [phone] and Fax [phone] TRANSTHORACIC ECHOCARDIOGRAM REPORT  Patient Name:      MOE MAYA      Reading Physician:    [id number]  Nicholas Antonio MD Study Date:        2/5/2024             Ordering Provider:    [id number] DOMONIQUE PURCELL MRN/PID:           [id number]             Fellow: Accession#:        [id number]         Nurse: Date of Birth/Age: 1955 / 68 years Sonographer:          Nina Angel                                                               RDNALLELY Gender:            F                    Additional Staff: Height:            167.64 cm            Admit Date:           2/2/2024 Weight:            102.51 kg            Admission Status:     Inpatient -                                                               Routine BSA:               2.11 m2              Encounter#:           [id number]                                         Department Location:  [facility]                                                               Invasive Blood Pressure: 147 /65 mmHg Study Type:    TRANSTHORACIC ECHO (TTE) COMPLETE Diagnosis/ICD: Acute combined systolic (congestive) and diastolic (congestive)                heart failure (CHF)-I50.41 Indication:    Congestive Heart Failure CPT Code:      Echo Complete w Full Doppler-[id number] Patient History: Diabetes:         Yes Pertinent         A-Fib, HTN, Dyspnea and LE Edema. Watchman device, elevated History:          BNP,. Study Detail: The following Echo studies were performed: 2D, M-Mode, Doppler and               color flow.  PHYSICIAN INTERPRETATION: Left Ventricle: The left ventricular systolic function is normal, with an estimated ejection fraction of 60-65%. There are no regional wall motion abnormalities. The left ventricular cavity size is normal. Spectral Doppler shows an impaired relaxation pattern of left ventricular diastolic filling. Left Atrium: The left atrium is moderately dilated. Right Ventricle: The right ventricle is mildly enlarged. There  is mildly reduced right ventricular systolic function. Right Atrium: The right atrium is moderately dilated. Aortic Valve: The aortic valve is trileaflet. There is mild aortic valve cusp calcification. There is trivial aortic valve regurgitation. The peak instantaneous gradient of the aortic valve is 14.1 mmHg. The mean gradient of the aortic valve is 9.1 mmHg. Mitral Valve: The mitral valve is normal in structure. There is mild mitral annular calcification. There is mild to moderate mitral valve regurgitation. Tricuspid Valve: The tricuspid valve is structurally normal. There is moderate tricuspid regurgitation. Pulmonic Valve: The pulmonic valve is structurally normal. There is mild pulmonic valve regurgitation. Pericardium: There is a small to moderate pericardial effusion posterior to the left ventricle. There is no evidence of cardiac tamponade. Aorta: The aortic root is normal. Pulmonary Artery: The tricuspid regurgitant velocity is 3.04 m/s, and with an estimated right atrial pressure of 3 mmHg, the estimated pulmonary artery pressure is mildly elevated with the RVSP at 39.9 mmHg. Pulmonary Veins: The pulmonary veins appear normal and return normally to the left atrium. Systemic Veins: The inferior vena cava appears to be of normal size. There is IVC inspiratory collapse greater than 50%.  CONCLUSIONS:  1. Left ventricular systolic function is normal with a 60-65% estimated ejection fraction.  2. Spectral Doppler shows an impaired relaxation pattern of left ventricular diastolic filling.  3. There is mildly reduced right ventricular systolic function.  4. The left atrium is moderately dilated.  5. The right atrium is moderately dilated.  6. There is a small to moderate pericardial effusion.  7. There is no evidence of cardiac tamponade.  8. Mild to moderate mitral valve regurgitation.  9. Moderate tricuspid regurgitation visualized. 10. Normal CVP. Estimated PASP around 45 mm Hg. QUANTITATIVE DATA SUMMARY: 2D  MEASUREMENTS:                           Normal Ranges: IVSd:          1.52 cm    (0.6-1.1cm) LVPWd:         1.42 cm    (0.6-1.1cm) LVIDd:         4.35 cm    (3.9-5.9cm) LVIDs:         2.94 cm LV Mass Index: 120.7 g/m2 LV % FS        32.3 % LA VOLUME:                              Normal Ranges: LA Vol A4C:       79.5 ml    (22+/-6mL/m2) LA Vol A2C:       78.2 ml LA Vol BP:        79.7 ml LA Vol Index A4C: 37.7ml/m2 LA Vol Index A2C: 37.1 ml/m2 LA Vol Index BP:  37.9 ml/m2 LA Volume Index:  37.8 ml/m2 LA Vol A4C:       76.0 ml LA Vol A2C:       73.9 ml RA VOLUME BY A/L METHOD:                       Normal Ranges: RA Area A4C: 17.8 cm2 M-MODE MEASUREMENTS:                  Normal Ranges: Ao Root: 3.00 cm (2.0-3.7cm) LAs:     5.08 cm (2.7-4.0cm) LV SYSTOLIC FUNCTION BY 2D PLANIMETRY (MOD):                     Normal Ranges: EF-A4C View: 63.4 % (>=55%) EF-A2C View: 63.8 % EF-Biplane:  62.5 % LV DIASTOLIC FUNCTION:                             Normal Ranges: MV Peak E:      1.33 m/s    (0.7-1.2 m/s) MV Peak A:      0.53 m/s    (0.42-0.7 m/s) E/A Ratio:      2.52        (1.0-2.2) MV e'           0.06 m/s    (>8.0) MV lateral e'   0.06 m/s MV medial e'    0.06 m/s MV A Dur:       110.73 msec E/e' Ratio:     22.09       (<8.0) PulmV Sys Mike:  53.97 cm/s PulmV Dugan Mike: 77.63 cm/s PulmV S/D Mike:  0.70 MITRAL VALVE:                 Normal Ranges: MV DT: 245 msec (150-240msec) MITRAL INSUFFICIENCY:                         Normal Ranges: MR VTI:     171.38 cm MR Vmax:    489.73 cm/s MR Volume:  20.07 ml MR Flow Rt: 57.36 ml/s MR EROA:    0.12 cm2 AORTIC VALVE:                                    Normal Ranges: AoV Vmax:                1.88 m/s  (<=1.7m/s) AoV Peak P.1 mmHg (<20mmHg) AoV Mean P.1 mmHg  (1.7-11.5mmHg) LVOT Max Mike:            0.97 m/s  (<=1.1m/s) AoV VTI:                 46.22 cm  (18-25cm) LVOT VTI:                24.89 cm LVOT Diameter:           1.96 cm   (1.8-2.4cm) AoV Area,  VTI:           1.62 cm2  (2.5-5.5cm2) AoV Area,Vmax:           1.56 cm2  (2.5-4.5cm2) AoV Dimensionless Index: 0.54  RIGHT VENTRICLE: RV Basal 3.80 cm RV Mid   2.80 cm RV Major 8.0 cm TAPSE:   18.0 mm RV s'    0.13 m/s TRICUSPID VALVE/RVSP:                             Normal Ranges: Peak TR Velocity: 3.04 m/s RV Syst Pressure: 39.9 mmHg (< 30mmHg) PULMONIC VALVE:                      Normal Ranges: PV Max Mike: 1.0 m/s  (0.6-0.9m/s) PV Max PG:  3.9 mmHg Pulmonary Veins: PulmV Dugan Mike: 77.63 cm/s PulmV S/D Mike:  0.70 PulmV Sys Mike:  53.97 cm/s  [id number] Nicholas Antonio MD Electronically signed on 2/5/2024 at 5:06:05 PM  ** Final **           Assessment/Plan   Principal Problem:    Heart failure (CMS/HCC)    1. Acute on chronic hypoxic/hypercapnic respiratory failure 2/2 acute on chronic diastolic CHF, influenza, COPD   -Isolation  -Tamiflu  -BNP 1444  -CXR showed CHF, Grossly stable pleural and parenchymal opacities in the mid and lower right hemithorax. Mild new left pleural effusion  -VQ scan showed moderate right pleural effusion  -bronchodilators  -Steroids  -oxygen/bipap support  -Significant leg to abd swelling and shortness of breath  -Strict I & Os  -Daily weights  -2gm na diet  -Echo as above  -Unable to start SGLT2 inhibitors with current GFR  -s/p RHC during last admit showed MEAN PA 30 MILD PULMONARY HTN MEAN RA 9 MM HG MEAN PCWP 23 MM HG CARDIAC OUTPUT 8.03 CI 3.79 NO SHUNTS   -Planning for dialysis  -Monitor on tele     2. Elevated troponins-non MI elevation 2/2 influenza, respiratory distress  -Denies chest pain  -Does have shortness of breath-worsening for weeks  -Unable to have LHC due to creatinine  -Cont asa, plavix, statin, metoprolol  -Monitor on tele     3. Paroxysmal atrial fibrillation with RVR  -s/p Watchman in Aug 2023  -Currently SB/SR  -Cont amiodarone and metoprolol for rate control  -Monitor on tele     4. Hypertension  -stable  -2gm na diet  -cont meds  -monitor     5. Acute on  chronic Anemia  -hgb down to 6.4, s/p PRBCs  -Hgb 7.6 today  -Monitor CBC  -Cont asa and plavix for now->hold if hgb conts to drop     6. Acute on Chronic kidney disease  -s/p nephrectomy  -Renal US negative  -Creatinine worsening and lower UOP with diuresis and still volume overloaded  -Renal consulted, note reviewed  -Planning for dialysis  -palliative care following     7. Diabetes  -ISS  -Accuchecks     8. Hypothyroidism  -Cont synthroid      Kacey Monique, APRN-CNP

## 2024-02-28 LAB
ALBUMIN SERPL BCP-MCNC: 2.6 G/DL (ref 3.4–5)
ANION GAP SERPL CALC-SCNC: 17 MMOL/L (ref 10–20)
BASOPHILS # BLD AUTO: 0.01 X10*3/UL (ref 0–0.1)
BASOPHILS NFR BLD AUTO: 0.1 %
BUN SERPL-MCNC: 106 MG/DL (ref 6–23)
CALCIUM SERPL-MCNC: 7.8 MG/DL (ref 8.6–10.3)
CHLORIDE SERPL-SCNC: 98 MMOL/L (ref 98–107)
CO2 SERPL-SCNC: 26 MMOL/L (ref 21–32)
CREAT SERPL-MCNC: 3.88 MG/DL (ref 0.5–1.05)
EGFRCR SERPLBLD CKD-EPI 2021: 12 ML/MIN/1.73M*2
EOSINOPHIL # BLD AUTO: 0 X10*3/UL (ref 0–0.7)
EOSINOPHIL NFR BLD AUTO: 0 %
ERYTHROCYTE [DISTWIDTH] IN BLOOD BY AUTOMATED COUNT: 13.9 % (ref 11.5–14.5)
GLUCOSE BLD MANUAL STRIP-MCNC: 215 MG/DL (ref 74–99)
GLUCOSE BLD MANUAL STRIP-MCNC: 246 MG/DL (ref 74–99)
GLUCOSE BLD MANUAL STRIP-MCNC: 255 MG/DL (ref 74–99)
GLUCOSE BLD MANUAL STRIP-MCNC: 290 MG/DL (ref 74–99)
GLUCOSE SERPL-MCNC: 287 MG/DL (ref 74–99)
HAV IGM SER QL: NONREACTIVE
HBV CORE IGM SER QL: NONREACTIVE
HBV SURFACE AG SERPL QL IA: NONREACTIVE
HCT VFR BLD AUTO: 25.8 % (ref 36–46)
HCV AB SER QL: NONREACTIVE
HGB BLD-MCNC: 7.9 G/DL (ref 12–16)
HOLD SPECIMEN: NORMAL
IMM GRANULOCYTES # BLD AUTO: 0.08 X10*3/UL (ref 0–0.7)
IMM GRANULOCYTES NFR BLD AUTO: 0.8 % (ref 0–0.9)
LYMPHOCYTES # BLD AUTO: 0.3 X10*3/UL (ref 1.2–4.8)
LYMPHOCYTES NFR BLD AUTO: 2.9 %
MCH RBC QN AUTO: 30 PG (ref 26–34)
MCHC RBC AUTO-ENTMCNC: 30.6 G/DL (ref 32–36)
MCV RBC AUTO: 98 FL (ref 80–100)
MONOCYTES # BLD AUTO: 0.45 X10*3/UL (ref 0.1–1)
MONOCYTES NFR BLD AUTO: 4.4 %
NEUTROPHILS # BLD AUTO: 9.35 X10*3/UL (ref 1.2–7.7)
NEUTROPHILS NFR BLD AUTO: 91.8 %
NRBC BLD-RTO: 0 /100 WBCS (ref 0–0)
PHOSPHATE SERPL-MCNC: 6 MG/DL (ref 2.5–4.9)
PLATELET # BLD AUTO: 360 X10*3/UL (ref 150–450)
POTASSIUM SERPL-SCNC: 5.1 MMOL/L (ref 3.5–5.3)
RBC # BLD AUTO: 2.63 X10*6/UL (ref 4–5.2)
SODIUM SERPL-SCNC: 136 MMOL/L (ref 136–145)
VANCOMYCIN SERPL-MCNC: 11.8 UG/ML (ref 5–20)
WBC # BLD AUTO: 10.2 X10*3/UL (ref 4.4–11.3)

## 2024-02-28 PROCEDURE — 2500000002 HC RX 250 W HCPCS SELF ADMINISTERED DRUGS (ALT 637 FOR MEDICARE OP, ALT 636 FOR OP/ED): Performed by: FAMILY MEDICINE

## 2024-02-28 PROCEDURE — 94660 CPAP INITIATION&MGMT: CPT

## 2024-02-28 PROCEDURE — 2500000004 HC RX 250 GENERAL PHARMACY W/ HCPCS (ALT 636 FOR OP/ED)

## 2024-02-28 PROCEDURE — 2500000001 HC RX 250 WO HCPCS SELF ADMINISTERED DRUGS (ALT 637 FOR MEDICARE OP)

## 2024-02-28 PROCEDURE — 80202 ASSAY OF VANCOMYCIN: CPT | Performed by: INTERNAL MEDICINE

## 2024-02-28 PROCEDURE — 94668 MNPJ CHEST WALL SBSQ: CPT

## 2024-02-28 PROCEDURE — 82947 ASSAY GLUCOSE BLOOD QUANT: CPT

## 2024-02-28 PROCEDURE — 1200000002 HC GENERAL ROOM WITH TELEMETRY DAILY

## 2024-02-28 PROCEDURE — 2500000005 HC RX 250 GENERAL PHARMACY W/O HCPCS: Performed by: INTERNAL MEDICINE

## 2024-02-28 PROCEDURE — 2500000002 HC RX 250 W HCPCS SELF ADMINISTERED DRUGS (ALT 637 FOR MEDICARE OP, ALT 636 FOR OP/ED)

## 2024-02-28 PROCEDURE — 80069 RENAL FUNCTION PANEL: CPT | Performed by: INTERNAL MEDICINE

## 2024-02-28 PROCEDURE — 94640 AIRWAY INHALATION TREATMENT: CPT

## 2024-02-28 PROCEDURE — 99233 SBSQ HOSP IP/OBS HIGH 50: CPT | Performed by: INTERNAL MEDICINE

## 2024-02-28 PROCEDURE — 99233 SBSQ HOSP IP/OBS HIGH 50: CPT

## 2024-02-28 PROCEDURE — 85025 COMPLETE CBC W/AUTO DIFF WBC: CPT | Performed by: INTERNAL MEDICINE

## 2024-02-28 RX ORDER — INSULIN GLARGINE 100 [IU]/ML
7 INJECTION, SOLUTION SUBCUTANEOUS ONCE
Status: COMPLETED | OUTPATIENT
Start: 2024-02-28 | End: 2024-02-29

## 2024-02-28 RX ADMIN — IPRATROPIUM BROMIDE AND ALBUTEROL SULFATE 3 ML: 2.5; .5 SOLUTION RESPIRATORY (INHALATION) at 20:33

## 2024-02-28 RX ADMIN — Medication 4 L/MIN: at 09:22

## 2024-02-28 RX ADMIN — PANTOPRAZOLE SODIUM 40 MG: 40 TABLET, DELAYED RELEASE ORAL at 21:49

## 2024-02-28 RX ADMIN — FERROUS GLUCONATE 324 MG: 324 TABLET ORAL at 08:45

## 2024-02-28 RX ADMIN — INSULIN LISPRO 9 UNITS: 100 INJECTION, SOLUTION INTRAVENOUS; SUBCUTANEOUS at 12:34

## 2024-02-28 RX ADMIN — PREDNISONE 40 MG: 20 TABLET ORAL at 08:45

## 2024-02-28 RX ADMIN — METOPROLOL SUCCINATE 50 MG: 50 TABLET, EXTENDED RELEASE ORAL at 08:45

## 2024-02-28 RX ADMIN — CLOPIDOGREL 75 MG: 75 TABLET ORAL at 08:45

## 2024-02-28 RX ADMIN — LEVOTHYROXINE SODIUM 200 MCG: 100 TABLET ORAL at 06:06

## 2024-02-28 RX ADMIN — IPRATROPIUM BROMIDE AND ALBUTEROL SULFATE 3 ML: 2.5; .5 SOLUTION RESPIRATORY (INHALATION) at 15:19

## 2024-02-28 RX ADMIN — METOPROLOL SUCCINATE 50 MG: 50 TABLET, EXTENDED RELEASE ORAL at 21:49

## 2024-02-28 RX ADMIN — AMIODARONE HYDROCHLORIDE 200 MG: 200 TABLET ORAL at 08:44

## 2024-02-28 RX ADMIN — ASPIRIN 81 MG: 81 TABLET, COATED ORAL at 08:45

## 2024-02-28 RX ADMIN — AMLODIPINE BESYLATE 5 MG: 5 TABLET ORAL at 08:45

## 2024-02-28 RX ADMIN — INSULIN LISPRO 9 UNITS: 100 INJECTION, SOLUTION INTRAVENOUS; SUBCUTANEOUS at 08:24

## 2024-02-28 RX ADMIN — PANTOPRAZOLE SODIUM 40 MG: 40 TABLET, DELAYED RELEASE ORAL at 08:46

## 2024-02-28 RX ADMIN — INSULIN LISPRO 6 UNITS: 100 INJECTION, SOLUTION INTRAVENOUS; SUBCUTANEOUS at 17:08

## 2024-02-28 RX ADMIN — IPRATROPIUM BROMIDE AND ALBUTEROL SULFATE 3 ML: 2.5; .5 SOLUTION RESPIRATORY (INHALATION) at 09:22

## 2024-02-28 RX ADMIN — VENLAFAXINE HYDROCHLORIDE 75 MG: 75 CAPSULE, EXTENDED RELEASE ORAL at 08:45

## 2024-02-28 RX ADMIN — ATORVASTATIN CALCIUM 40 MG: 40 TABLET, FILM COATED ORAL at 08:46

## 2024-02-28 RX ADMIN — Medication 5 MG: at 21:49

## 2024-02-28 RX ADMIN — POLYETHYLENE GLYCOL 3350 17 G: 17 POWDER, FOR SOLUTION ORAL at 08:44

## 2024-02-28 ASSESSMENT — COGNITIVE AND FUNCTIONAL STATUS - GENERAL
MOBILITY SCORE: 8
DRESSING REGULAR UPPER BODY CLOTHING: A LOT
TOILETING: A LOT
WALKING IN HOSPITAL ROOM: TOTAL
DRESSING REGULAR LOWER BODY CLOTHING: A LOT
PERSONAL GROOMING: A LOT
DAILY ACTIVITIY SCORE: 13
HELP NEEDED FOR BATHING: A LOT
MOVING FROM LYING ON BACK TO SITTING ON SIDE OF FLAT BED WITH BEDRAILS: A LOT
CLIMB 3 TO 5 STEPS WITH RAILING: TOTAL
EATING MEALS: A LITTLE
TURNING FROM BACK TO SIDE WHILE IN FLAT BAD: A LOT
STANDING UP FROM CHAIR USING ARMS: TOTAL
MOVING TO AND FROM BED TO CHAIR: TOTAL

## 2024-02-28 ASSESSMENT — PAIN - FUNCTIONAL ASSESSMENT: PAIN_FUNCTIONAL_ASSESSMENT: 0-10

## 2024-02-28 ASSESSMENT — PAIN SCALES - GENERAL
PAINLEVEL_OUTOF10: 0 - NO PAIN
PAINLEVEL_OUTOF10: 0 - NO PAIN

## 2024-02-28 NOTE — PROGRESS NOTES
"Daphne Morris is a 68 y.o. female on day 9 of admission presenting with Heart failure (CMS/HCC).      Subjective   She is sitting up in the bed, poor appetite, states she doesn't feel well today. Was supposed to get dialysis catheter yesterday, unable to lay flat.       Review of systems:  Constitutional: negative for fever, chills, or malaise  Neuro: negative for dizziness, headache, numbness, tingling  ENT: Negative for nasal congestion or sore throat  CV: negative for chest pain, palpitations  GI: negative for abd pain, nausea, vomiting or diarrhea  : negative for dysuria, frequency, or urgency  Skin: negative for lesions, wounds, or rash  Musculoskeletal: Negative for myalgia, or arthralgia  Endocrine: Negative for polyuria or polydipsia         Objective   Constitutional: Well developed, alert and oriented x3, no distress, cooperative  Eyes: PERRL, EOMI, clear sclera  ENMT: mucous membranes moist, no apparent injury, no lesions seen  Head/Neck: Neck supple, no apparent injury, thyroid without mass or tenderness, No JVD, trachea midline, no bruits  Respiratory/Thorax: Patent airways, diminished throughout with good chest expansion, thorax symmetric  Cardiovascular: Regular, rate and rhythm, no murmurs, 2+ equal pulses of the extremities, normal S 1and S 2  Gastrointestinal: Nondistended, soft, non-tender, no rebound tenderness or guarding, no masses palpable, no organomegaly, +BS, no bruits  Musculoskeletal: ROM intact, no joint swelling, normal strength  Extremities: generalized edema  Neurological: Alert and oriented x3  Lymphatic: No significant lymphadenopathy  Skin: Warm and dry, no lesions, no rashes      Last Recorded Vitals  /71 (BP Location: Left arm, Patient Position: Lying)   Pulse 66   Temp 36.6 °C (97.9 °F) (Temporal)   Resp 18   Ht 1.676 m (5' 6\")   Wt 98.5 kg (217 lb 2.5 oz)   SpO2 96%   BMI 35.05 kg/m²     Intake/Output last 3 Shifts:  I/O last 3 completed shifts:  In: 680 (6.9 " mL/kg) [P.O.:680]  Out: 1050 (10.7 mL/kg) [Urine:1050 (0.3 mL/kg/hr)]  Weight: 98.5 kg   I/O this shift:  In: 120 [P.O.:120]  Out: -     Relevant Results  Scheduled medications  amiodarone, 200 mg, oral, Daily with breakfast  amLODIPine, 5 mg, oral, Daily  aspirin, 81 mg, oral, Daily  atorvastatin, 40 mg, oral, Daily  clopidogrel, 75 mg, oral, Daily  ferrous gluconate, 324 mg, oral, Daily with breakfast  [Held by provider] heparin (porcine), 5,000 Units, subcutaneous, q8h  [Held by provider] insulin glargine, 14 Units, subcutaneous, Nightly  insulin lispro, 0-15 Units, subcutaneous, TID with meals  ipratropium-albuteroL, 3 mL, nebulization, TID  levothyroxine, 200 mcg, oral, Daily before breakfast  lidocaine, 5 mL, infiltration, Once  melatonin, 5 mg, oral, Daily  metoprolol succinate XL, 50 mg, oral, BID  pantoprazole, 40 mg, oral, BID  polyethylene glycol, 17 g, oral, Daily  potassium chloride CR, 20 mEq, oral, Daily  predniSONE, 40 mg, oral, Daily  venlafaxine XR, 75 mg, oral, Daily      Continuous medications  [Held by provider] furosemide, 10 mg/hr, Last Rate: 10 mg/hr (02/22/24 1927)      PRN medications  PRN medications: acetaminophen **OR** acetaminophen **OR** acetaminophen, acetaminophen **OR** acetaminophen **OR** acetaminophen, albuterol, dextrose 10 % in water (D10W), dextrose, diphenhydrAMINE, glucagon, ipratropium-albuteroL, ondansetron **OR** ondansetron, oxygen, oxygen, vancomycin    Results for orders placed or performed during the hospital encounter of 02/19/24 (from the past 24 hour(s))   Hepatitis panel, acute   Result Value Ref Range    Hepatitis B Surface AG Nonreactive Nonreactive    Hepatitis A  AB- IgM Nonreactive Nonreactive    Hepatitis B Core AB; IgM Nonreactive Nonreactive    Hepatitis C AB Nonreactive Nonreactive   POCT GLUCOSE   Result Value Ref Range    POCT Glucose 203 (H) 74 - 99 mg/dL   POCT GLUCOSE   Result Value Ref Range    POCT Glucose 210 (H) 74 - 99 mg/dL   Lavender Top    Result Value Ref Range    Extra Tube Hold for add-ons.    Vancomycin   Result Value Ref Range    Vancomycin 11.8 5.0 - 20.0 ug/mL   CBC and Auto Differential   Result Value Ref Range    WBC 10.2 4.4 - 11.3 x10*3/uL    nRBC 0.0 0.0 - 0.0 /100 WBCs    RBC 2.63 (L) 4.00 - 5.20 x10*6/uL    Hemoglobin 7.9 (L) 12.0 - 16.0 g/dL    Hematocrit 25.8 (L) 36.0 - 46.0 %    MCV 98 80 - 100 fL    MCH 30.0 26.0 - 34.0 pg    MCHC 30.6 (L) 32.0 - 36.0 g/dL    RDW 13.9 11.5 - 14.5 %    Platelets 360 150 - 450 x10*3/uL    Neutrophils % 91.8 40.0 - 80.0 %    Immature Granulocytes %, Automated 0.8 0.0 - 0.9 %    Lymphocytes % 2.9 13.0 - 44.0 %    Monocytes % 4.4 2.0 - 10.0 %    Eosinophils % 0.0 0.0 - 6.0 %    Basophils % 0.1 0.0 - 2.0 %    Neutrophils Absolute 9.35 (H) 1.20 - 7.70 x10*3/uL    Immature Granulocytes Absolute, Automated 0.08 0.00 - 0.70 x10*3/uL    Lymphocytes Absolute 0.30 (L) 1.20 - 4.80 x10*3/uL    Monocytes Absolute 0.45 0.10 - 1.00 x10*3/uL    Eosinophils Absolute 0.00 0.00 - 0.70 x10*3/uL    Basophils Absolute 0.01 0.00 - 0.10 x10*3/uL   Renal Function Panel   Result Value Ref Range    Glucose 287 (H) 74 - 99 mg/dL    Sodium 136 136 - 145 mmol/L    Potassium 5.1 3.5 - 5.3 mmol/L    Chloride 98 98 - 107 mmol/L    Bicarbonate 26 21 - 32 mmol/L    Anion Gap 17 10 - 20 mmol/L    Urea Nitrogen 106 (HH) 6 - 23 mg/dL    Creatinine 3.88 (H) 0.50 - 1.05 mg/dL    eGFR 12 (L) >60 mL/min/1.73m*2    Calcium 7.8 (L) 8.6 - 10.3 mg/dL    Phosphorus 6.0 (H) 2.5 - 4.9 mg/dL    Albumin 2.6 (L) 3.4 - 5.0 g/dL   POCT GLUCOSE   Result Value Ref Range    POCT Glucose 290 (H) 74 - 99 mg/dL   POCT GLUCOSE   Result Value Ref Range    POCT Glucose 255 (H) 74 - 99 mg/dL       XR chest 1 view   Final Result   1. Large right pleural effusion similar to recent prior studies.   2. Pulmonary venous hypertension.   3. Cardiomegaly.   4. Probable left lower lobe infiltrate or atelectasis.                  MACRO:   None        Signed by: Mario  Camilla 2/21/2024 11:57 AM   Dictation workstation:   [id number]      NM Lung perfusion with spect/ct   Final Result   There are no segmental or subsegmental perfusion defects in both   lungs indicating low probability for acute pulmonary embolism.   Moderate-sized right pleural effusion.             The interpretation above is based on modified PISAPED criteria.        This study was analyzed and interpreted at [place], [place].        Signed by: Rolando Dillon 2/19/2024 10:38 AM   Dictation workstation:   [id number]      Bedside Midline Imaging    (Results Pending)   Consult to Interventional Radiology    (Results Pending)       Transthoracic Echo (TTE) Complete    Result Date: 2/5/2024   [facility], [address] [place] [address]               Tel [phone] and Fax [phone] TRANSTHORACIC ECHOCARDIOGRAM REPORT  Patient Name:      MOE MAYA      Reading Physician:    [id number] Nicholas Antonio MD Study Date:        2/5/2024             Ordering Provider:    [id number] DOMONIQUE PURCELL MRN/PID:           [id number]             Fellow: Accession#:        [id number]         Nurse: Date of Birth/Age: 1955 / 68 years Sonographer:          Nina Angel                                                               [facility] Gender:            F                    Additional Staff: Height:            167.64 cm            Admit Date:           2/2/2024 Weight:            102.51 kg            Admission Status:     Inpatient -                                                               Routine BSA:               2.11 m2              Encounter#:           [id number]                                         Department Location:  [facility] Non                                                               Invasive Blood Pressure: 147 /65  mmHg Study Type:    TRANSTHORACIC ECHO (TTE) COMPLETE Diagnosis/ICD: Acute combined systolic (congestive) and diastolic (congestive)                heart failure (CHF)-I50.41 Indication:    Congestive Heart Failure CPT Code:      Echo Complete w Full Doppler-[id number] Patient History: Diabetes:         Yes Pertinent         A-Fib, HTN, Dyspnea and LE Edema. Watchman device, elevated History:          BNP,. Study Detail: The following Echo studies were performed: 2D, M-Mode, Doppler and               color flow.  PHYSICIAN INTERPRETATION: Left Ventricle: The left ventricular systolic function is normal, with an estimated ejection fraction of 60-65%. There are no regional wall motion abnormalities. The left ventricular cavity size is normal. Spectral Doppler shows an impaired relaxation pattern of left ventricular diastolic filling. Left Atrium: The left atrium is moderately dilated. Right Ventricle: The right ventricle is mildly enlarged. There is mildly reduced right ventricular systolic function. Right Atrium: The right atrium is moderately dilated. Aortic Valve: The aortic valve is trileaflet. There is mild aortic valve cusp calcification. There is trivial aortic valve regurgitation. The peak instantaneous gradient of the aortic valve is 14.1 mmHg. The mean gradient of the aortic valve is 9.1 mmHg. Mitral Valve: The mitral valve is normal in structure. There is mild mitral annular calcification. There is mild to moderate mitral valve regurgitation. Tricuspid Valve: The tricuspid valve is structurally normal. There is moderate tricuspid regurgitation. Pulmonic Valve: The pulmonic valve is structurally normal. There is mild pulmonic valve regurgitation. Pericardium: There is a small to moderate pericardial effusion posterior to the left ventricle. There is no evidence of cardiac tamponade. Aorta: The aortic root is normal. Pulmonary Artery: The tricuspid regurgitant velocity is 3.04 m/s, and with an estimated right  atrial pressure of 3 mmHg, the estimated pulmonary artery pressure is mildly elevated with the RVSP at 39.9 mmHg. Pulmonary Veins: The pulmonary veins appear normal and return normally to the left atrium. Systemic Veins: The inferior vena cava appears to be of normal size. There is IVC inspiratory collapse greater than 50%.  CONCLUSIONS:  1. Left ventricular systolic function is normal with a 60-65% estimated ejection fraction.  2. Spectral Doppler shows an impaired relaxation pattern of left ventricular diastolic filling.  3. There is mildly reduced right ventricular systolic function.  4. The left atrium is moderately dilated.  5. The right atrium is moderately dilated.  6. There is a small to moderate pericardial effusion.  7. There is no evidence of cardiac tamponade.  8. Mild to moderate mitral valve regurgitation.  9. Moderate tricuspid regurgitation visualized. 10. Normal CVP. Estimated PASP around 45 mm Hg. QUANTITATIVE DATA SUMMARY: 2D MEASUREMENTS:                           Normal Ranges: IVSd:          1.52 cm    (0.6-1.1cm) LVPWd:         1.42 cm    (0.6-1.1cm) LVIDd:         4.35 cm    (3.9-5.9cm) LVIDs:         2.94 cm LV Mass Index: 120.7 g/m2 LV % FS        32.3 % LA VOLUME:                              Normal Ranges: LA Vol A4C:       79.5 ml    (22+/-6mL/m2) LA Vol A2C:       78.2 ml LA Vol BP:        79.7 ml LA Vol Index A4C: 37.7ml/m2 LA Vol Index A2C: 37.1 ml/m2 LA Vol Index BP:  37.9 ml/m2 LA Volume Index:  37.8 ml/m2 LA Vol A4C:       76.0 ml LA Vol A2C:       73.9 ml RA VOLUME BY A/L METHOD:                       Normal Ranges: RA Area A4C: 17.8 cm2 M-MODE MEASUREMENTS:                  Normal Ranges: Ao Root: 3.00 cm (2.0-3.7cm) LAs:     5.08 cm (2.7-4.0cm) LV SYSTOLIC FUNCTION BY 2D PLANIMETRY (MOD):                     Normal Ranges: EF-A4C View: 63.4 % (>=55%) EF-A2C View: 63.8 % EF-Biplane:  62.5 % LV DIASTOLIC FUNCTION:                             Normal Ranges: MV Peak E:      1.33 m/s     (0.7-1.2 m/s) MV Peak A:      0.53 m/s    (0.42-0.7 m/s) E/A Ratio:      2.52        (1.0-2.2) MV e'           0.06 m/s    (>8.0) MV lateral e'   0.06 m/s MV medial e'    0.06 m/s MV A Dur:       110.73 msec E/e' Ratio:     22.09       (<8.0) PulmV Sys Mike:  53.97 cm/s PulmV Dugan Mike: 77.63 cm/s PulmV S/D Mike:  0.70 MITRAL VALVE:                 Normal Ranges: MV DT: 245 msec (150-240msec) MITRAL INSUFFICIENCY:                         Normal Ranges: MR VTI:     171.38 cm MR Vmax:    489.73 cm/s MR Volume:  20.07 ml MR Flow Rt: 57.36 ml/s MR EROA:    0.12 cm2 AORTIC VALVE:                                    Normal Ranges: AoV Vmax:                1.88 m/s  (<=1.7m/s) AoV Peak P.1 mmHg (<20mmHg) AoV Mean P.1 mmHg  (1.7-11.5mmHg) LVOT Max Mike:            0.97 m/s  (<=1.1m/s) AoV VTI:                 46.22 cm  (18-25cm) LVOT VTI:                24.89 cm LVOT Diameter:           1.96 cm   (1.8-2.4cm) AoV Area, VTI:           1.62 cm2  (2.5-5.5cm2) AoV Area,Vmax:           1.56 cm2  (2.5-4.5cm2) AoV Dimensionless Index: 0.54  RIGHT VENTRICLE: RV Basal 3.80 cm RV Mid   2.80 cm RV Major 8.0 cm TAPSE:   18.0 mm RV s'    0.13 m/s TRICUSPID VALVE/RVSP:                             Normal Ranges: Peak TR Velocity: 3.04 m/s RV Syst Pressure: 39.9 mmHg (< 30mmHg) PULMONIC VALVE:                      Normal Ranges: PV Max Mike: 1.0 m/s  (0.6-0.9m/s) PV Max PG:  3.9 mmHg Pulmonary Veins: PulmV Dugan Mike: 77.63 cm/s PulmV S/D Mike:  0.70 PulmV Sys Mike:  53.97 cm/s  [id number] Nicholas Antonio MD Electronically signed on 2024 at 5:06:05 PM  ** Final **           Assessment/Plan   Principal Problem:    Heart failure (CMS/HCC)    1. Acute on chronic hypoxic/hypercapnic respiratory failure 2/2 acute on chronic diastolic CHF, influenza, COPD   -Isolation  -Tamiflu  -BNP 1444  -CXR showed CHF, Grossly stable pleural and parenchymal opacities in the mid and lower right hemithorax. Mild new left pleural  effusion  -VQ scan showed moderate right pleural effusion  -bronchodilators  -Steroids  -oxygen/bipap support  -Significant leg to abd swelling and shortness of breath  -Strict I & Os  -Daily weights  -2gm na diet  -Echo as above  -Unable to start SGLT2 inhibitors with current GFR  -s/p RHC during last admit showed MEAN PA 30 MILD PULMONARY HTN MEAN RA 9 MM HG MEAN PCWP 23 MM HG CARDIAC OUTPUT 8.03 CI 3.79 NO SHUNTS   -Planning for dialysis  -Monitor on tele     2. Elevated troponins-non MI elevation 2/2 influenza, respiratory distress  -Denies chest pain  -Does have shortness of breath-worsening for weeks  -Unable to have LHC due to creatinine  -Cont asa, plavix, statin, metoprolol  -Monitor on tele     3. Paroxysmal atrial fibrillation with RVR  -s/p Watchman in Aug 2023  -Currently SB/SR  -Cont amiodarone and metoprolol for rate control  -Monitor on tele     4. Hypertension  -stable  -2gm na diet  -cont meds  -monitor     5. Acute on chronic Anemia  -hgb down to 6.4, s/p PRBCs  -Hgb 7.6->7.9 today  -Monitor CBC  -Cont asa and plavix for now->hold if hgb conts to drop     6. Acute on Chronic kidney disease  -s/p nephrectomy  -Renal US negative  -Creatinine worsening and lower UOP with diuresis and still volume overloaded  -Renal consulted, note reviewed  -Planning for dialysis  -palliative care following     7. Diabetes  -ISS  -Accuchecks     8. Hypothyroidism  -Cont synthroid      CARLENE Villegas-CNP

## 2024-02-28 NOTE — PROGRESS NOTES
Occupational Therapy                 Therapy Communication Note    Patient Name: Daphne Morris  MRN: [id number]  Today's Date: 2/28/2024     Discipline: Occupational Therapy    Missed Visit Reason: Reschedule (Nurse requested reattempt in afternoon if able. Team to address need for dialysis catheter placement this am.)    Missed Time: Attempt

## 2024-02-28 NOTE — PROGRESS NOTES
"Daphne Morris is a 68 y.o. female on day 9 of admission presenting with Heart failure (CMS/HCC).      Subjective   Patient seen today.  She has no new complaints today.  She was unable to get the tunneled dialysis catheter yesterday.    Objective   /71 (BP Location: Left arm, Patient Position: Lying)   Pulse 66   Temp 36.6 °C (97.9 °F) (Temporal)   Resp 18   Ht 1.676 m (5' 6\")   Wt 98.5 kg (217 lb 2.5 oz)   SpO2 96%   BMI 35.05 kg/m²   Wt Readings from Last 3 Encounters:   02/22/24 98.5 kg (217 lb 2.5 oz)   02/18/24 102 kg (224 lb 1.6 oz)   02/15/24 95.8 kg (211 lb 3.2 oz)       Physical Exam      Intake/Output Summary (Last 24 hours) at 2/28/2024 1116  Last data filed at 2/28/2024 0900  Gross per 24 hour   Intake 600 ml   Output 550 ml   Net 50 ml     General appearance: no distress awake and alert on room air, euvolemic on exam  Eyes: non-icteric  HEENT: atrumatic head, PEERLA, moist mucosa  Skin: no apparent rash, Heart: NSR, S1, S2 normal, no murmur or gallop  Lungs: Symmetrical expansion, decreased air entry bilaterally with basal crackles   Abdomen: soft, nt/nd, obese  Extremities: bilateral upper extremity edema with bilateral lower extremity swelling. Extremity tender to touch bu no inflammation   Neuro: No FND,asterixis, no focal deficits noticed. Has flapping tremors      Review of Systems     Constitutional: no fever, no chills, no recent weight gain and no recent weight loss.   Eyes: no blurred vision and no diplopia.   ENT: no hearing loss, no earache, no sore throat, no swollen glands in the neck and no nasal discharge.   Cardiovascular: no chest pain, no palpitations and no lower extremity edema.   Respiratory: no shortness of breath, no chronic cough and no shortness of breath during exertion.   Gastrointestinal: no abdominal pain, no constipation, no heartburn, no vomiting, no bloody stools and no change in bowel movements.   Genitourinary: no dysuria and no hematuria. "   Musculoskeletal: no arthralgias and no myalgias.   Skin: no rashes and no skin lesions.   Neurological: no headaches and no dizziness.   Psychiatric: no confusion, no depression and no anxiety.   Endocrine: no heat intolerance, no cold intolerance, appetite not increased, no thyroid disorder, no increased urinary frequency and no dry skin.   Hematologic/Lymphatic: does not bleed easily and does not bruise easily.   All other systems have been reviewed and are negative for complaint.         Data Review        Results from last 7 days   Lab Units 02/27/24  0542 02/25/24  0610 02/24/24  0507   WBC AUTO x10*3/uL 11.4* 7.6 6.2   HEMOGLOBIN g/dL 7.6* 7.9* 7.9*   HEMATOCRIT % 25.0* 26.1* 25.5*   PLATELETS AUTO x10*3/uL 338 349 325      Lab Results   Component Value Date    HGBA1C 7.9 (H) 02/16/2024     Results from last 7 days   Lab Units 02/27/24  0542 02/25/24  0610 02/24/24  0507 02/23/24  0533 02/22/24  0454   SODIUM mmol/L 135* 137 133* 133* 132*   POTASSIUM mmol/L 4.8 5.2 4.9 4.9 5.1   CHLORIDE mmol/L 97* 94* 94* 94* 94*   CO2 mmol/L 27 30 29 27 29   BUN mg/dL 106* 102* 88* 82* 78*   GLUCOSE mg/dL 266* 196* 251* 217* 267*   CALCIUM mg/dL 7.8* 7.8* 7.6* 7.8* 7.5*   ANION GAP mmol/L 16 18 15 17 14   CREATININE mg/dL 3.89* 4.08* 4.09* 3.93* 3.75*   EGFR mL/min/1.73m*2 12* 11* 11* 12* 13*     Albumin/Creatine Ratio   Date Value Ref Range Status   02/01/2024   Final     Comment:     One or more analytes used in this calculation is outside of the analytical measurement range. Calculation cannot be performed.       Recent Labs     02/27/24  0542 02/25/24  0610 02/24/24  0507 02/23/24  0533 02/22/24  0454 02/21/24  0614 02/20/24  0604   * 137 133* 133* 132* 134* 136   K 4.8 5.2 4.9 4.9 5.1 5.0 4.8   CL 97* 94* 94* 94* 94* 96* 97*   CO2 27 30 29 27 29 29 30   * 102* 88* 82* 78* 69* 63*   CREATININE 3.89* 4.08* 4.09* 3.93* 3.75* 3.51* 3.20*   GLUCOSE 266* 196* 251* 217* 267* 179* 63*   CALCIUM 7.8* 7.8* 7.6*  7.8* 7.5* 7.6* 7.5*   PHOS 6.0* 5.5* 5.5* 5.5* 6.0* 5.8* 4.8   EGFR 12* 11* 11* 12* 13* 14* 15*   ANIONGAP 16 18 15 17 14 14 14      Recent Labs     02/19/24  1543 02/09/24  1248 02/01/24  0515 07/19/23  1437 04/26/23 1740 05/03/21 2035 04/23/21 0310 04/06/21  1200 04/10/18  1415   COLORU Yellow Yellow Yellow STRAW YELLOW YELLOW YELLOW PALE YELLOW YELLOW   APPEARANCEU Hazy* Clear Clear CLEAR CLEAR CLEAR HAZY HAZY CLOUDY   SPECGRAVU 1.016 1.011 1.013 1.008 1.009 1.012 1.020 1.005 1.017   SYLVIE 5.0 5.0 5.0 6.0 6.0 6.5 6.0 6.5 5.5   PROTUR >=500 (3+)* >=500 (3+)* >=500 (3+)* >=500 (3+)* 100(2+)* 300* 600* 100* 30*   GLUCOSEU 50 (1+)* 150 (2+)* >=500 (3+)* 150 (2+)* 50(1+)* 100* 150* 50* 300*   BLOODU NEGATIVE MODERATE (2+)* NEGATIVE LARGE (3+)* SMALL(1+)* TRACE* TRACE* MODERATE* TRACE*   KETONESU 5 (TRACE)* NEGATIVE NEGATIVE NEGATIVE NEGATIVE NEGATIVE NEGATIVE NEGATIVE NEGATIVE   BILIRUBINU NEGATIVE NEGATIVE NEGATIVE NEGATIVE NEGATIVE NEGATIVE NEGATIVE NEGATIVE NEGATIVE   NITRITEU NEGATIVE NEGATIVE NEGATIVE NEGATIVE NEGATIVE NEGATIVE NEGATIVE POSITIVE* NEGATIVE   LEUKOCYTESU NEGATIVE TRACE* NEGATIVE NEGATIVE NEGATIVE NEGATIVE SMALL* LARGE* LARGE*       Urine Electrolytes  Recent Labs     02/19/24  1543 02/09/24  1248 02/01/24  0515 07/19/23  1437 04/30/23  1316 04/26/23 1740 05/03/21 2035 04/23/21 0310 04/06/21  1200 04/10/18  1415   SODIUMURR  --   --  40  --  27  --   --   --   --   --    NACREATUR  --   --  75  --  27  --   --   --   --   --    KUR  --   --  64  --   --   --   --   --   --   --    KCREATUR  --   --  119  --   --   --   --   --   --   --    CREATU  --   --  53.6  53.6  --  101.0  --   --   --   --   --    PROTUR >=500 (3+)* >=500 (3+)* >=500 (3+)* >=500 (3+)*  --  100(2+)* 300* 600* 100* 30*   ALBUMINUR  --   --  >2,250.0  --   --   --   --   --   --   --         Urine Micro  Recent Labs     02/19/24  1543 02/09/24  1248 02/01/24  0515 07/19/23  1437 04/26/23  1740 05/03/21 2035 04/23/21  0310  04/06/21  1200 04/10/18  1415   WBCU 1-5 6-10* 1-5 5* 1 NONE SEEN 53* LOADED 1,077*   RBCU 1-2 11-20* 3-5 >182* 1 2-4 5* 16-25 5*   HYALCASTU  --  2+* OCCASIONAL*  --   --   --  70  --  3   SQUAMEPIU 1-9 (SPARSE) 1-9 (SPARSE)  --  1 1  --  MODERATE  --  FEW   BACTERIAU 1+* 1+* 1+* 1+*  --   --  POSITIVE PRESENT  Performed at [facility] [address] [facility] [address]   POSITIVE   MUCUSU  --  FEW FEW FEW  --   --   --   --   --         Iron  Recent Labs     02/20/24  0604 05/02/23  0607 04/28/23  0615 04/27/23  0624 02/23/23  1113   IRON 24*  --   --  26* 54   TIBC 214*  --   --  289 281   IRONSAT 11*  --   --  9* 19.2   FERRITIN 234* 71 80  --  42        amiodarone, 200 mg, oral, Daily with breakfast  amLODIPine, 5 mg, oral, Daily  aspirin, 81 mg, oral, Daily  atorvastatin, 40 mg, oral, Daily  clopidogrel, 75 mg, oral, Daily  ferrous gluconate, 324 mg, oral, Daily with breakfast  [Held by provider] heparin (porcine), 5,000 Units, subcutaneous, q8h  [Held by provider] insulin glargine, 14 Units, subcutaneous, Nightly  insulin lispro, 0-15 Units, subcutaneous, TID with meals  ipratropium-albuteroL, 3 mL, nebulization, TID  levothyroxine, 200 mcg, oral, Daily before breakfast  lidocaine, 5 mL, infiltration, Once  melatonin, 5 mg, oral, Daily  metoprolol succinate XL, 50 mg, oral, BID  pantoprazole, 40 mg, oral, BID  polyethylene glycol, 17 g, oral, Daily  potassium chloride CR, 20 mEq, oral, Daily  predniSONE, 40 mg, oral, Daily  venlafaxine XR, 75 mg, oral, Daily      Heart failure (CMS/[facility])     Assessment and Plan   Daphne Morris  is a 68 y.o. female who has past medical history of  heart failure, atrial fibrillation s/p Watchman, right nephrectomy, hypertension, type 2 DM admitted for acute hypoxic respiratory failure 2/2 to pneumonia and acute decompensated heart failure.        # ANDREA on CKD   -Likely  Cardiorenal syndrome 2/2 Acute decompensated CHF  -(CKD) 4/A3-baseline SCr 1.8-2.5 most likely  atherosclerotic cardiovascular disease/diabetic nephropathy, prior nephrectomy.  She follows with Dr. Mitchell  - Peaked Scr during this admission 3-4.09, GFR 11   -Kidney ultrasound with no obstruction on the left side.  Right total nephrectomy noticed     Plan  -Patient will need intermittent hemodialysis, on account of persistently worsening renal function, refractory to diuretic therapy.  -Long discussion with patient on need to dialyze, she was also informed that although dialysis may increase life expectancy, may not necessarily improve quality of life.  -Discussed with patient's primary nephrologist Dr. Harris, who is agreeable to plan for IHD.  And in the event status patient consents to dialysis, subsequent dialysis sessions would be at [facility]  - Patient is agreeable to dialysis   -Plans remain as is, obtain tunneled dialysis catheter and initiate dialysis    Jazmine Segal MD

## 2024-02-28 NOTE — PROGRESS NOTES
Daphne Morris is a 68 y.o. female on day 9 of admission presenting with Heart failure (CMS/HCC).      Subjective   Seen and examined, no new complaints.  No overnight events.  The plan discussed with the patient and RN.    Objective     Last Recorded Vitals  /78 (BP Location: Right arm, Patient Position: Lying)   Pulse 98   Temp 36.5 °C (97.7 °F) (Temporal)   Resp 19   Wt 98.5 kg (217 lb 2.5 oz)   SpO2 98%   Intake/Output last 3 Shifts:    Intake/Output Summary (Last 24 hours) at 2/28/2024 1621  Last data filed at 2/28/2024 0900  Gross per 24 hour   Intake 360 ml   Output 300 ml   Net 60 ml       Admission Weight  Weight: 95.4 kg (210 lb 5.1 oz) (02/19/24 0438)    Daily Weight  02/22/24 : 98.5 kg (217 lb 2.5 oz)    Image Results  ECG 12 Lead  Junctional rhythm  Left axis deviation  Inferior infarct , age undetermined  Anterolateral infarct (cited on or before 01-FEB-2024)  Abnormal ECG  When compared with ECG of 01-FEB-2024 02:46,  Junctional rhythm has replaced Sinus rhythm  Questionable change in initial forces of Lateral leads  See ED provider note for full interpretation and clinical correlation  Confirmed by Rose Viera ([id number]) on 2/24/2024 5:48:25 PM      Physical Exam  Constitutional: patient is alert and cooperative with exam  skin: dry and warm  Eyes: EOMI and clear sclera  ENMT: moist mucous membranes  Head/Neck: neck supple   Respiratory/Thorax: Clear to auscultation bilaterally, no wheezing or crackles appreciated  Cardiovascular: regular rate and rhythm, S1 and S2 present, no murmurs heard  Gastrointestinal: bowel sounds present, no pain or tenderness upon palpation, soft and nondistended  Extremities: +2 radial, posterior tibial, and pedal pulse, no lower extremity edema noted  Neurological: AxOx3    Relevant Results  Scheduled medications  amiodarone, 200 mg, oral, Daily with breakfast  amLODIPine, 5 mg, oral, Daily  aspirin, 81 mg, oral, Daily  atorvastatin, 40 mg, oral,  Daily  clopidogrel, 75 mg, oral, Daily  ferrous gluconate, 324 mg, oral, Daily with breakfast  [Held by provider] heparin (porcine), 5,000 Units, subcutaneous, q8h  [Held by provider] insulin glargine, 14 Units, subcutaneous, Nightly  insulin lispro, 0-15 Units, subcutaneous, TID with meals  ipratropium-albuteroL, 3 mL, nebulization, TID  levothyroxine, 200 mcg, oral, Daily before breakfast  lidocaine, 5 mL, infiltration, Once  melatonin, 5 mg, oral, Daily  metoprolol succinate XL, 50 mg, oral, BID  pantoprazole, 40 mg, oral, BID  polyethylene glycol, 17 g, oral, Daily  potassium chloride CR, 20 mEq, oral, Daily  predniSONE, 40 mg, oral, Daily  venlafaxine XR, 75 mg, oral, Daily      Continuous medications  [Held by provider] furosemide, 10 mg/hr, Last Rate: 10 mg/hr (02/22/24 1927)      PRN medications  PRN medications: acetaminophen **OR** acetaminophen **OR** acetaminophen, acetaminophen **OR** acetaminophen **OR** acetaminophen, albuterol, dextrose 10 % in water (D10W), dextrose, diphenhydrAMINE, glucagon, ipratropium-albuteroL, ondansetron **OR** ondansetron, oxygen, oxygen    Results for orders placed or performed during the hospital encounter of 02/19/24 (from the past 24 hour(s))   POCT GLUCOSE   Result Value Ref Range    POCT Glucose 203 (H) 74 - 99 mg/dL   POCT GLUCOSE   Result Value Ref Range    POCT Glucose 210 (H) 74 - 99 mg/dL   Lavender Top   Result Value Ref Range    Extra Tube Hold for add-ons.    Vancomycin   Result Value Ref Range    Vancomycin 11.8 5.0 - 20.0 ug/mL   CBC and Auto Differential   Result Value Ref Range    WBC 10.2 4.4 - 11.3 x10*3/uL    nRBC 0.0 0.0 - 0.0 /100 WBCs    RBC 2.63 (L) 4.00 - 5.20 x10*6/uL    Hemoglobin 7.9 (L) 12.0 - 16.0 g/dL    Hematocrit 25.8 (L) 36.0 - 46.0 %    MCV 98 80 - 100 fL    MCH 30.0 26.0 - 34.0 pg    MCHC 30.6 (L) 32.0 - 36.0 g/dL    RDW 13.9 11.5 - 14.5 %    Platelets 360 150 - 450 x10*3/uL    Neutrophils % 91.8 40.0 - 80.0 %    Immature Granulocytes %,  Automated 0.8 0.0 - 0.9 %    Lymphocytes % 2.9 13.0 - 44.0 %    Monocytes % 4.4 2.0 - 10.0 %    Eosinophils % 0.0 0.0 - 6.0 %    Basophils % 0.1 0.0 - 2.0 %    Neutrophils Absolute 9.35 (H) 1.20 - 7.70 x10*3/uL    Immature Granulocytes Absolute, Automated 0.08 0.00 - 0.70 x10*3/uL    Lymphocytes Absolute 0.30 (L) 1.20 - 4.80 x10*3/uL    Monocytes Absolute 0.45 0.10 - 1.00 x10*3/uL    Eosinophils Absolute 0.00 0.00 - 0.70 x10*3/uL    Basophils Absolute 0.01 0.00 - 0.10 x10*3/uL   Renal Function Panel   Result Value Ref Range    Glucose 287 (H) 74 - 99 mg/dL    Sodium 136 136 - 145 mmol/L    Potassium 5.1 3.5 - 5.3 mmol/L    Chloride 98 98 - 107 mmol/L    Bicarbonate 26 21 - 32 mmol/L    Anion Gap 17 10 - 20 mmol/L    Urea Nitrogen 106 (HH) 6 - 23 mg/dL    Creatinine 3.88 (H) 0.50 - 1.05 mg/dL    eGFR 12 (L) >60 mL/min/1.73m*2    Calcium 7.8 (L) 8.6 - 10.3 mg/dL    Phosphorus 6.0 (H) 2.5 - 4.9 mg/dL    Albumin 2.6 (L) 3.4 - 5.0 g/dL   POCT GLUCOSE   Result Value Ref Range    POCT Glucose 290 (H) 74 - 99 mg/dL   POCT GLUCOSE   Result Value Ref Range    POCT Glucose 255 (H) 74 - 99 mg/dL       Assessment/Plan      Principal Problem:    Heart failure (CMS/[facility])    Daphne Morris is a 68 y.o. female presenting with past medical history of heart failure, atrial fibrillation, Hypertension, type 2 DM transfer from [place]'s ER due to acute hypoxic respiratory failure secondary to pneumonia, COPD exacerbation and acute diastolic heart failure.     #Stage III/IV chronic kidney disease  Status post right nephrectomy  Nephrology on board, family are agreeable with hemodialysis, unable to get a dialysis catheter over the weekend,   - Consult General surgery pending line placement for HD start, Dr. Kim will place the line tomorrow      #Acute hypoxic respiratory failure secondary to acute on chronic diastolic heart failure and influenza B pneumonia  -Cardiology on board, unable to do left heart cath due to elevated creatinine,  unable to start SGLT2 inhibitors as well  -Pulmonary on board plan to continue prednisone, breathing treatment, patient finished Tamiflu      #Anemia  -Status post 1 unit of blood   -FOBT positive  -A iron saturation of 11 iron level of 24 ferritin in 200  -On ferrous gluconate      #Paroxysmal atrial fibrillation  Status post watchman in August 2023  Currently in sinus rhythm  Continue amiodarone and metoprolol     #Type 2 diabetes mellitus  Continue insulin sliding scale  Holding Lantus due to limited oral intake  Holding Januvia  Continue diabetic diet  Follow Accu-Cheks     #Hypertension  Metoprolol      #GERD  -PPI     #yperlipidemia  Atorvastatin     #Depression  Venlafaxine     #Hypothyroidism  Continue Synthroid     #DVT prophylaxis  Heparin     #Disposition  -Patient met with hospice, the patient and her family are not ready to sign with hospice right now.  -CODE STATUS changed to DNR/DNI  -Nephrology discussed with the family hemodialysis and they were agreeable  -Plan to consult General surgery to get dialysis catheter and to start hemodialysis     Myke Newsome MD

## 2024-02-28 NOTE — NURSING NOTE
Pt desat to 79% on 3L HFNC, increased to 10L, Spo2 90% and increasing, Pt in no resp distress, no sob noted or reported. Will wean Fio2 as able. RN bedside and updated

## 2024-02-28 NOTE — CARE PLAN
The patient's goals for the shift include  to see if I am getting this dialysis catheter in today?    The clinical goals for the shift include Pt will remain HDS throughout shift.     Detail Level: Zone

## 2024-02-28 NOTE — PROGRESS NOTES
"Physical Therapy                 Therapy Communication Note    Patient Name: Daphne Morris  MRN: [id number]  Today's Date: 2/28/2024     Discipline: Physical Therapy    Missed Visit Reason: Missed Visit Reason: Patient refused (Patient declined session stating \"I'm just too tired, every time I fall asleep someone wakes me up\". Discussed case with [facility], plans for dialysis line to be placed today.)  Will attempt to see patient following her first dialysis session to see if participation can improve.     Missed Time: Attempt    "

## 2024-02-28 NOTE — CONSULTS
Vancomycin Dosing by Pharmacy- Cessation of Therapy    Consult to pharmacy for vancomycin dosing has been discontinued by the prescriber, pharmacy will sign off at this time.    Please call pharmacy if there are further questions or re-enter a consult if vancomycin is resumed.     Karley Elaine, PharmD

## 2024-02-29 ENCOUNTER — ANESTHESIA EVENT (OUTPATIENT)
Dept: OPERATING ROOM | Facility: HOSPITAL | Age: 69
DRG: 291 | End: 2024-02-29
Payer: MEDICARE

## 2024-02-29 LAB
ANION GAP SERPL CALC-SCNC: 14 MMOL/L (ref 10–20)
BASOPHILS # BLD AUTO: 0.01 X10*3/UL (ref 0–0.1)
BASOPHILS NFR BLD AUTO: 0.1 %
BUN SERPL-MCNC: 106 MG/DL (ref 6–23)
CALCIUM SERPL-MCNC: 7.6 MG/DL (ref 8.6–10.3)
CHLORIDE SERPL-SCNC: 97 MMOL/L (ref 98–107)
CO2 SERPL-SCNC: 29 MMOL/L (ref 21–32)
CREAT SERPL-MCNC: 3.82 MG/DL (ref 0.5–1.05)
EGFRCR SERPLBLD CKD-EPI 2021: 12 ML/MIN/1.73M*2
EOSINOPHIL # BLD AUTO: 0 X10*3/UL (ref 0–0.7)
EOSINOPHIL NFR BLD AUTO: 0 %
ERYTHROCYTE [DISTWIDTH] IN BLOOD BY AUTOMATED COUNT: 14 % (ref 11.5–14.5)
GLUCOSE BLD MANUAL STRIP-MCNC: 216 MG/DL (ref 74–99)
GLUCOSE BLD MANUAL STRIP-MCNC: 239 MG/DL (ref 74–99)
GLUCOSE BLD MANUAL STRIP-MCNC: 257 MG/DL (ref 74–99)
GLUCOSE BLD MANUAL STRIP-MCNC: 296 MG/DL (ref 74–99)
GLUCOSE SERPL-MCNC: 288 MG/DL (ref 74–99)
HCT VFR BLD AUTO: 25.8 % (ref 36–46)
HGB BLD-MCNC: 7.9 G/DL (ref 12–16)
IMM GRANULOCYTES # BLD AUTO: 0.08 X10*3/UL (ref 0–0.7)
IMM GRANULOCYTES NFR BLD AUTO: 0.7 % (ref 0–0.9)
LYMPHOCYTES # BLD AUTO: 0.33 X10*3/UL (ref 1.2–4.8)
LYMPHOCYTES NFR BLD AUTO: 2.9 %
MCH RBC QN AUTO: 29.9 PG (ref 26–34)
MCHC RBC AUTO-ENTMCNC: 30.6 G/DL (ref 32–36)
MCV RBC AUTO: 98 FL (ref 80–100)
MONOCYTES # BLD AUTO: 0.73 X10*3/UL (ref 0.1–1)
MONOCYTES NFR BLD AUTO: 6.3 %
NEUTROPHILS # BLD AUTO: 10.4 X10*3/UL (ref 1.2–7.7)
NEUTROPHILS NFR BLD AUTO: 90 %
NRBC BLD-RTO: 0 /100 WBCS (ref 0–0)
PLATELET # BLD AUTO: 338 X10*3/UL (ref 150–450)
POTASSIUM SERPL-SCNC: 4.6 MMOL/L (ref 3.5–5.3)
RBC # BLD AUTO: 2.64 X10*6/UL (ref 4–5.2)
SODIUM SERPL-SCNC: 135 MMOL/L (ref 136–145)
WBC # BLD AUTO: 11.6 X10*3/UL (ref 4.4–11.3)

## 2024-02-29 PROCEDURE — 97535 SELF CARE MNGMENT TRAINING: CPT | Mod: GO,CO

## 2024-02-29 PROCEDURE — 2500000001 HC RX 250 WO HCPCS SELF ADMINISTERED DRUGS (ALT 637 FOR MEDICARE OP): Performed by: INTERNAL MEDICINE

## 2024-02-29 PROCEDURE — 1200000002 HC GENERAL ROOM WITH TELEMETRY DAILY

## 2024-02-29 PROCEDURE — 99233 SBSQ HOSP IP/OBS HIGH 50: CPT | Performed by: INTERNAL MEDICINE

## 2024-02-29 PROCEDURE — 85025 COMPLETE CBC W/AUTO DIFF WBC: CPT | Performed by: INTERNAL MEDICINE

## 2024-02-29 PROCEDURE — 99233 SBSQ HOSP IP/OBS HIGH 50: CPT

## 2024-02-29 PROCEDURE — 2500000005 HC RX 250 GENERAL PHARMACY W/O HCPCS: Performed by: INTERNAL MEDICINE

## 2024-02-29 PROCEDURE — 2500000002 HC RX 250 W HCPCS SELF ADMINISTERED DRUGS (ALT 637 FOR MEDICARE OP, ALT 636 FOR OP/ED): Performed by: INTERNAL MEDICINE

## 2024-02-29 PROCEDURE — 82947 ASSAY GLUCOSE BLOOD QUANT: CPT

## 2024-02-29 PROCEDURE — 2500000001 HC RX 250 WO HCPCS SELF ADMINISTERED DRUGS (ALT 637 FOR MEDICARE OP)

## 2024-02-29 PROCEDURE — 94668 MNPJ CHEST WALL SBSQ: CPT

## 2024-02-29 PROCEDURE — 80048 BASIC METABOLIC PNL TOTAL CA: CPT | Performed by: INTERNAL MEDICINE

## 2024-02-29 PROCEDURE — 94640 AIRWAY INHALATION TREATMENT: CPT

## 2024-02-29 PROCEDURE — 99221 1ST HOSP IP/OBS SF/LOW 40: CPT | Performed by: SURGERY

## 2024-02-29 PROCEDURE — 2500000002 HC RX 250 W HCPCS SELF ADMINISTERED DRUGS (ALT 637 FOR MEDICARE OP, ALT 636 FOR OP/ED): Performed by: FAMILY MEDICINE

## 2024-02-29 PROCEDURE — 2500000002 HC RX 250 W HCPCS SELF ADMINISTERED DRUGS (ALT 637 FOR MEDICARE OP, ALT 636 FOR OP/ED)

## 2024-02-29 PROCEDURE — 2500000004 HC RX 250 GENERAL PHARMACY W/ HCPCS (ALT 636 FOR OP/ED)

## 2024-02-29 RX ORDER — AMMONIUM LACTATE 12 G/100G
LOTION TOPICAL 2 TIMES DAILY
Status: DISCONTINUED | OUTPATIENT
Start: 2024-02-29 | End: 2024-03-05 | Stop reason: HOSPADM

## 2024-02-29 RX ADMIN — AMLODIPINE BESYLATE 5 MG: 5 TABLET ORAL at 09:55

## 2024-02-29 RX ADMIN — Medication 4 L/MIN: at 08:37

## 2024-02-29 RX ADMIN — INSULIN GLARGINE 7 UNITS: 100 INJECTION, SOLUTION SUBCUTANEOUS at 00:30

## 2024-02-29 RX ADMIN — ATORVASTATIN CALCIUM 40 MG: 40 TABLET, FILM COATED ORAL at 09:55

## 2024-02-29 RX ADMIN — LEVOTHYROXINE SODIUM 200 MCG: 100 TABLET ORAL at 06:26

## 2024-02-29 RX ADMIN — IPRATROPIUM BROMIDE AND ALBUTEROL SULFATE 3 ML: 2.5; .5 SOLUTION RESPIRATORY (INHALATION) at 14:54

## 2024-02-29 RX ADMIN — INSULIN LISPRO 9 UNITS: 100 INJECTION, SOLUTION INTRAVENOUS; SUBCUTANEOUS at 11:51

## 2024-02-29 RX ADMIN — METOPROLOL SUCCINATE 50 MG: 50 TABLET, EXTENDED RELEASE ORAL at 09:55

## 2024-02-29 RX ADMIN — IPRATROPIUM BROMIDE AND ALBUTEROL SULFATE 3 ML: 2.5; .5 SOLUTION RESPIRATORY (INHALATION) at 20:08

## 2024-02-29 RX ADMIN — AMIODARONE HYDROCHLORIDE 200 MG: 200 TABLET ORAL at 09:55

## 2024-02-29 RX ADMIN — VENLAFAXINE HYDROCHLORIDE 75 MG: 75 CAPSULE, EXTENDED RELEASE ORAL at 09:56

## 2024-02-29 RX ADMIN — PREDNISONE 40 MG: 20 TABLET ORAL at 09:56

## 2024-02-29 RX ADMIN — PANTOPRAZOLE SODIUM 40 MG: 40 TABLET, DELAYED RELEASE ORAL at 20:55

## 2024-02-29 RX ADMIN — FERROUS GLUCONATE 324 MG: 324 TABLET ORAL at 09:55

## 2024-02-29 RX ADMIN — ASPIRIN 81 MG: 81 TABLET, COATED ORAL at 09:56

## 2024-02-29 RX ADMIN — Medication: at 20:55

## 2024-02-29 RX ADMIN — IPRATROPIUM BROMIDE AND ALBUTEROL SULFATE 3 ML: 2.5; .5 SOLUTION RESPIRATORY (INHALATION) at 08:37

## 2024-02-29 RX ADMIN — INSULIN LISPRO 6 UNITS: 100 INJECTION, SOLUTION INTRAVENOUS; SUBCUTANEOUS at 19:02

## 2024-02-29 RX ADMIN — Medication 5 MG: at 20:55

## 2024-02-29 RX ADMIN — CLOPIDOGREL 75 MG: 75 TABLET ORAL at 09:55

## 2024-02-29 RX ADMIN — METOPROLOL SUCCINATE 50 MG: 50 TABLET, EXTENDED RELEASE ORAL at 20:55

## 2024-02-29 RX ADMIN — PANTOPRAZOLE SODIUM 40 MG: 40 TABLET, DELAYED RELEASE ORAL at 09:55

## 2024-02-29 ASSESSMENT — PAIN SCALES - GENERAL
PAINLEVEL_OUTOF10: 0 - NO PAIN

## 2024-02-29 ASSESSMENT — COGNITIVE AND FUNCTIONAL STATUS - GENERAL
DAILY ACTIVITIY SCORE: 13
DRESSING REGULAR LOWER BODY CLOTHING: TOTAL
MOBILITY SCORE: 8
MOBILITY SCORE: 8
MOVING TO AND FROM BED TO CHAIR: TOTAL
TURNING FROM BACK TO SIDE WHILE IN FLAT BAD: A LOT
HELP NEEDED FOR BATHING: A LOT
HELP NEEDED FOR BATHING: A LOT
PERSONAL GROOMING: A LOT
DRESSING REGULAR LOWER BODY CLOTHING: TOTAL
CLIMB 3 TO 5 STEPS WITH RAILING: TOTAL
STANDING UP FROM CHAIR USING ARMS: TOTAL
TOILETING: A LOT
PERSONAL GROOMING: A LOT
TURNING FROM BACK TO SIDE WHILE IN FLAT BAD: A LOT
MOVING FROM LYING ON BACK TO SITTING ON SIDE OF FLAT BED WITH BEDRAILS: A LOT
DRESSING REGULAR UPPER BODY CLOTHING: A LOT
STANDING UP FROM CHAIR USING ARMS: TOTAL
TOILETING: TOTAL
MOVING TO AND FROM BED TO CHAIR: TOTAL
EATING MEALS: A LITTLE
DRESSING REGULAR UPPER BODY CLOTHING: A LOT
DAILY ACTIVITIY SCORE: 11
MOVING FROM LYING ON BACK TO SITTING ON SIDE OF FLAT BED WITH BEDRAILS: A LOT
DRESSING REGULAR UPPER BODY CLOTHING: A LOT
WALKING IN HOSPITAL ROOM: TOTAL
EATING MEALS: A LITTLE
DRESSING REGULAR LOWER BODY CLOTHING: A LOT
PERSONAL GROOMING: A LOT
CLIMB 3 TO 5 STEPS WITH RAILING: TOTAL
EATING MEALS: A LITTLE
HELP NEEDED FOR BATHING: A LOT
WALKING IN HOSPITAL ROOM: TOTAL
TOILETING: TOTAL
DAILY ACTIVITIY SCORE: 11

## 2024-02-29 ASSESSMENT — PAIN - FUNCTIONAL ASSESSMENT
PAIN_FUNCTIONAL_ASSESSMENT: 0-10
PAIN_FUNCTIONAL_ASSESSMENT: 0-10

## 2024-02-29 ASSESSMENT — ACTIVITIES OF DAILY LIVING (ADL): HOME_MANAGEMENT_TIME_ENTRY: 24

## 2024-02-29 NOTE — CONSULTS
General/Trauma Surgery History and Physical      History Of Present Illness  Daphne Morris is a 68 y.o. female with history of heart failure requiring multiple recent admissions who presented to [place] on 24 for shortness of breath starting earlier in the day and was transferred to [facility] in order to obtain a VQ scan. She was recently admitted -2/15 for acute respiratory failure 2/2 CHF exacerbation, COPD, ANDREA with a thoracentesis and right heart cath with mild pulmonary HTN. She has continued to have progression of CKD most consistent with cardiorenal syndrome, remains fluid overloaded despite diuresis and is now in need of intermittent dialysis. She is typically on 2L NC at home and has been increased to 4L in the hospital. Denies ever having a central line in the past.       Past Medical History  Acute on chronic heart failure - echo 24  COPD  A fib s/p watchman  Hyperlipidemia  Hypertension  PVD  Diabetes type II  CVA history  CKD - right nephrectomy due to malignancy  Anemia  Hypothyroid  GERD  Depression       Surgical History  Past Surgical History:   Procedure Laterality Date    ANKLE SURGERY          CARDIAC CATHETERIZATION N/A 2024    Procedure: Right Heart Cath;  Surgeon: Nicholas Antonio MD;  Location: [facility] Cardiac Cath Lab;  Service: Cardiovascular;  Laterality: N/A;    CATARACT EXTRACTION Right     2019     SECTION, CLASSIC      MR CHEST ANGIO W AND WO IV CONTRAST  2023    MR CHEST ANGIO W AND WO IV CONTRAST 2023 [facility]    MR HEAD ANGIO WO IV CONTRAST  2021    MR HEAD ANGIO WO IV CONTRAST [facility]    NEPHRECTOMY  2021    SHOULDER SURGERY          [facility]         Social History   reports that she has been smoking cigarettes. She started smoking about 55 years ago. She has a 25.00 pack-year smoking history. She has never been exposed to tobacco smoke. She has never used smokeless tobacco. She reports that she does not  "currently use alcohol. She reports current drug use. Drug: Marijuana.     Family History  family history includes Asthma in her daughter; Cancer in her brother and sister; Diabetes in her daughter, father, maternal grandfather, and another family member; Heart attack in her daughter; Heart disease in her father and paternal grandmother; Hypertension in her mother.     Allergies  Adhesive tape-silicones, Amoxicillin, Bee venom protein (honey bee), Codeine, Doxycycline, Latex, Lisinopril, Prednisone, Citalopram, Oseltamivir, and Phenyleph-shark oil-glyc-pet    Meds  Current Outpatient Medications   Medication Instructions    albuterol 90 mcg/actuation inhaler 2 puffs, inhalation, Every 4 hours PRN    amiodarone (PACERONE) 200 mg, oral, Daily with breakfast    amLODIPine (NORVASC) 5 mg, oral, Daily    aspirin 81 mg, oral, Daily    atorvastatin (LIPITOR) 40 mg, oral, Daily    BD Calista 2nd Gen Pen Needle 32 gauge x 5/32\" needle USE SUBCUTANEOUSLY AS DIRECTED TWICE DAILY    clopidogrel (PLAVIX) 75 mg, oral, Daily    ferrous gluconate (FERGON) 324 mg, oral, Daily with breakfast    FreeStyle Shakir 14 Day Sensor kit USE AS DIRECTED TO CHECK SUGARS 3 TO 4 TIMES DAILY    FreeStyle Lite Strips strip 2 times daily    furosemide (LASIX) 80 mg, oral, 2 times daily    HumaLOG KwikPen Insulin 100 unit/mL injection  up to 15 units Subcutaneous three times a day per sliding scale    ipratropium-albuteroL (Duo-Neb) 0.5-2.5 mg/3 mL nebulizer solution 3 mL, nebulization, Every 6 hours PRN    Lantus Solostar U-100 Insulin 14 Units, subcutaneous, Nightly    levothyroxine (SYNTHROID, LEVOXYL) 200 mcg, oral, Daily before breakfast    levothyroxine (SYNTHROID, LEVOXYL) 50 mcg, oral, Daily before breakfast    metoprolol succinate XL (TOPROL-XL) 50 mg, oral, 2 times daily    oxygen (O2) gas therapy  2 l/min nasal cannula    pantoprazole (PROTONIX) 40 mg, oral, 2 times daily    potassium chloride CR (Klor-Con M20) 20 mEq ER tablet 40 mEq, oral, " "Daily, Do not crush or chew.    potassium chloride CR 20 mEq ER tablet 20 mEq, oral, Daily, Take with food.    SITagliptin phosphate (Januvia) 100 mg tablet 1 tablet, oral, Daily    Soaanz 60 mg tablet 1 tablet, oral, Daily    venlafaxine XR (EFFEXOR-XR) 75 mg, oral, Daily        Review of Systems   A complete 10 point review of systems was performed and is negative except as noted in the history of present illness.     Last Recorded Vitals  Blood pressure 168/74, pulse 70, temperature 36.8 °C (98.2 °F), temperature source Temporal, resp. rate 17, height 1.676 m (5' 6\"), weight 99.1 kg (218 lb 7.6 oz), SpO2 96 %.    Pain Score: 0 - No pain     Physical Exam  Constitutional: No acute distress. Sitting up in bed.  Neuro: Alert, oriented. Follows commands.   Eyes: Extraocular motions intact. No scleral icterus. Conjunctiva pink.   EENT: Mucous membranes moist. Normal dentition. Hears normal speaking voice.  Neck: Supple. No masses.  Cardiovascular: Regular rate. Palpable pulses bilaterally. No pitting edema. Scar to L anterior shoulder. Generalized edema throughout all 4 extremities.  Respiratory: No increased work of breathing or audible wheeze. Equal expansion. 4L NC.  Abdomen: Soft, obese abdomen. Nondistended. Nontender throughout.   MSK: Moves all extremities. No atrophy.  Skin: Warm, dry, intact. No rashes or lesions.   Psychological: Appropriate mood and behavior.           Relevant Results  Laboratory Results:  CBC:   Lab Results   Component Value Date    WBC 11.6 (H) 02/29/2024    RBC 2.64 (L) 02/29/2024    HGB 7.9 (L) 02/29/2024    HCT 25.8 (L) 02/29/2024     02/29/2024       RFP:   Lab Results   Component Value Date     (L) 02/29/2024    K 4.6 02/29/2024    CL 97 (L) 02/29/2024    CO2 29 02/29/2024     (HH) 02/29/2024    CREATININE 3.82 (H) 02/29/2024    CALCIUM 7.6 (L) 02/29/2024    MG 1.98 02/27/2024    PHOS 6.0 (H) 02/28/2024        LFTs:   Lab Results   Component Value Date    ALBUMIN " 2.6 (L) 02/28/2024         Imaging:  ECG 12 Lead  Junctional rhythm  Left axis deviation  Inferior infarct , age undetermined  Anterolateral infarct (cited on or before 01-FEB-2024)  Abnormal ECG  When compared with ECG of 01-FEB-2024 02:46,  Junctional rhythm has replaced Sinus rhythm  Questionable change in initial forces of Lateral leads  See ED provider note for full interpretation and clinical correlation  Confirmed by Rose Viera ([id number]) on 2/24/2024 5:48:25 PM     Echo - 2/5/24  CXR with pulmonary congestion and effusion to right hemithorax, mild left effusion        Assessment/Plan   This is a 68 y.o. female with multiple recent admissions regarding heart failure - just discharged 2 days prior to readmission on 2/19/24. Continues to have ongoing heart failure with fluid retention and progression of CKD most consistent with cardiorenal syndrome. Surgery is consulted for request of a tunneled dialysis line placement to initiate dialysis. Discussed case with anesthesia.     Plan:  -- Plan for TDC placement tomorrow in OR  -- OK for diet today, NPO after midnight  -- Hold plavix                Seen and discussed with Dr. Kim who is in agreement with plan. Please doc halo with questions.    Reyna Cruz PA-C

## 2024-02-29 NOTE — H&P (VIEW-ONLY)
General/Trauma Surgery History and Physical      History Of Present Illness  Daphne Morris is a 68 y.o. female with history of heart failure requiring multiple recent admissions who presented to [place] on 24 for shortness of breath starting earlier in the day and was transferred to [facility] in order to obtain a VQ scan. She was recently admitted -2/15 for acute respiratory failure 2/2 CHF exacerbation, COPD, ANDREA with a thoracentesis and right heart cath with mild pulmonary HTN. She has continued to have progression of CKD most consistent with cardiorenal syndrome, remains fluid overloaded despite diuresis and is now in need of intermittent dialysis. She is typically on 2L NC at home and has been increased to 4L in the hospital. Denies ever having a central line in the past.       Past Medical History  Acute on chronic heart failure - echo 24  COPD  A fib s/p watchman  Hyperlipidemia  Hypertension  PVD  Diabetes type II  CVA history  CKD - right nephrectomy due to malignancy  Anemia  Hypothyroid  GERD  Depression       Surgical History  Past Surgical History:   Procedure Laterality Date    ANKLE SURGERY          CARDIAC CATHETERIZATION N/A 2024    Procedure: Right Heart Cath;  Surgeon: Nicholas Antonio MD;  Location: [facility] Cardiac Cath Lab;  Service: Cardiovascular;  Laterality: N/A;    CATARACT EXTRACTION Right     2019     SECTION, CLASSIC      MR CHEST ANGIO W AND WO IV CONTRAST  2023    MR CHEST ANGIO W AND WO IV CONTRAST 2023 [facility] MRI    MR HEAD ANGIO WO IV CONTRAST  2021    MR HEAD ANGIO WO IV CONTRAST [facility]    NEPHRECTOMY  2021    SHOULDER SURGERY          [facility]         Social History   reports that she has been smoking cigarettes. She started smoking about 55 years ago. She has a 25.00 pack-year smoking history. She has never been exposed to tobacco smoke. She has never used smokeless tobacco. She reports that she does not  "currently use alcohol. She reports current drug use. Drug: Marijuana.     Family History  family history includes Asthma in her daughter; Cancer in her brother and sister; Diabetes in her daughter, father, maternal grandfather, and another family member; Heart attack in her daughter; Heart disease in her father and paternal grandmother; Hypertension in her mother.     Allergies  Adhesive tape-silicones, Amoxicillin, Bee venom protein (honey bee), Codeine, Doxycycline, Latex, Lisinopril, Prednisone, Citalopram, Oseltamivir, and Phenyleph-shark oil-glyc-pet    Meds  Current Outpatient Medications   Medication Instructions    albuterol 90 mcg/actuation inhaler 2 puffs, inhalation, Every 4 hours PRN    amiodarone (PACERONE) 200 mg, oral, Daily with breakfast    amLODIPine (NORVASC) 5 mg, oral, Daily    aspirin 81 mg, oral, Daily    atorvastatin (LIPITOR) 40 mg, oral, Daily    BD Calista 2nd Gen Pen Needle 32 gauge x 5/32\" needle USE SUBCUTANEOUSLY AS DIRECTED TWICE DAILY    clopidogrel (PLAVIX) 75 mg, oral, Daily    ferrous gluconate (FERGON) 324 mg, oral, Daily with breakfast    FreeStyle Shakir 14 Day Sensor kit USE AS DIRECTED TO CHECK SUGARS 3 TO 4 TIMES DAILY    FreeStyle Lite Strips strip 2 times daily    furosemide (LASIX) 80 mg, oral, 2 times daily    HumaLOG KwikPen Insulin 100 unit/mL injection  up to 15 units Subcutaneous three times a day per sliding scale    ipratropium-albuteroL (Duo-Neb) 0.5-2.5 mg/3 mL nebulizer solution 3 mL, nebulization, Every 6 hours PRN    Lantus Solostar U-100 Insulin 14 Units, subcutaneous, Nightly    levothyroxine (SYNTHROID, LEVOXYL) 200 mcg, oral, Daily before breakfast    levothyroxine (SYNTHROID, LEVOXYL) 50 mcg, oral, Daily before breakfast    metoprolol succinate XL (TOPROL-XL) 50 mg, oral, 2 times daily    oxygen (O2) gas therapy  2 l/min nasal cannula    pantoprazole (PROTONIX) 40 mg, oral, 2 times daily    potassium chloride CR (Klor-Con M20) 20 mEq ER tablet 40 mEq, oral, " "Daily, Do not crush or chew.    potassium chloride CR 20 mEq ER tablet 20 mEq, oral, Daily, Take with food.    SITagliptin phosphate (Januvia) 100 mg tablet 1 tablet, oral, Daily    Soaanz 60 mg tablet 1 tablet, oral, Daily    venlafaxine XR (EFFEXOR-XR) 75 mg, oral, Daily        Review of Systems   A complete 10 point review of systems was performed and is negative except as noted in the history of present illness.     Last Recorded Vitals  Blood pressure 168/74, pulse 70, temperature 36.8 °C (98.2 °F), temperature source Temporal, resp. rate 17, height 1.676 m (5' 6\"), weight 99.1 kg (218 lb 7.6 oz), SpO2 96 %.    Pain Score: 0 - No pain     Physical Exam  Constitutional: No acute distress. Sitting up in bed.  Neuro: Alert, oriented. Follows commands.   Eyes: Extraocular motions intact. No scleral icterus. Conjunctiva pink.   EENT: Mucous membranes moist. Normal dentition. Hears normal speaking voice.  Neck: Supple. No masses.  Cardiovascular: Regular rate. Palpable pulses bilaterally. No pitting edema. Scar to L anterior shoulder. Generalized edema throughout all 4 extremities.  Respiratory: No increased work of breathing or audible wheeze. Equal expansion. 4L NC.  Abdomen: Soft, obese abdomen. Nondistended. Nontender throughout.   MSK: Moves all extremities. No atrophy.  Skin: Warm, dry, intact. No rashes or lesions.   Psychological: Appropriate mood and behavior.           Relevant Results  Laboratory Results:  CBC:   Lab Results   Component Value Date    WBC 11.6 (H) 02/29/2024    RBC 2.64 (L) 02/29/2024    HGB 7.9 (L) 02/29/2024    HCT 25.8 (L) 02/29/2024     02/29/2024       RFP:   Lab Results   Component Value Date     (L) 02/29/2024    K 4.6 02/29/2024    CL 97 (L) 02/29/2024    CO2 29 02/29/2024     (HH) 02/29/2024    CREATININE 3.82 (H) 02/29/2024    CALCIUM 7.6 (L) 02/29/2024    MG 1.98 02/27/2024    PHOS 6.0 (H) 02/28/2024        LFTs:   Lab Results   Component Value Date    ALBUMIN " 2.6 (L) 02/28/2024         Imaging:  ECG 12 Lead  Junctional rhythm  Left axis deviation  Inferior infarct , age undetermined  Anterolateral infarct (cited on or before 01-FEB-2024)  Abnormal ECG  When compared with ECG of 01-FEB-2024 02:46,  Junctional rhythm has replaced Sinus rhythm  Questionable change in initial forces of Lateral leads  See ED provider note for full interpretation and clinical correlation  Confirmed by Rose Viera ([id number]) on 2/24/2024 5:48:25 PM     Echo - 2/5/24  CXR with pulmonary congestion and effusion to right hemithorax, mild left effusion        Assessment/Plan   This is a 68 y.o. female with multiple recent admissions regarding heart failure - just discharged 2 days prior to readmission on 2/19/24. Continues to have ongoing heart failure with fluid retention and progression of CKD most consistent with cardiorenal syndrome. Surgery is consulted for request of a tunneled dialysis line placement to initiate dialysis. Discussed case with anesthesia.     Plan:  -- Plan for TDC placement tomorrow in OR  -- OK for diet today, NPO after midnight  -- Hold plavix                Seen and discussed with Dr. Kim who is in agreement with plan. Please doc halo with questions.    Reyna Cruz PA-C

## 2024-02-29 NOTE — CONSULTS
Patient has Malnutrition Diagnosis: Yes  Diagnosis Status: New  Malnutrition Diagnosis: Severe malnutrition related to acute disease or injury  As Evidenced by: +1-2 edema, consuming less than 50% of estimated energy requirements >/= 5 days, loss of muscle and adipose stores  Additional Assessment Information: ONS added for additional kcal and protein  Nutrition Assessment    Reason for Assessment: Dietitian discretion, Length of stay    Patient is a 68 y.o. female presenting with: Heart failure (CMS/[facility]),   Past Medical History:   Diagnosis Date    Acute CHF (CMS/[facility])     04/2023    ANDREA (acute kidney injury) (CMS/[facility])     Anemia     Arthritis     ASHD (arteriosclerotic heart disease)     At risk for falling     Atrial fibrillation (CMS/[facility])     Cancer of kidney (CMS/[facility])     Chronic diastolic CHF (congestive heart failure) (CMS/[facility])     COPD (chronic obstructive pulmonary disease) (CMS/[facility])     CVA (cerebral vascular accident) (CMS/[facility])     2021- righ sided weakness    Depression     Diabetes (CMS/[facility])     Essential tremor     GERD (gastroesophageal reflux disease)     HTN (hypertension)     Hyperlipidemia     Hypothyroidism     s/p radioactive iodine treatment    Hypoxia     Impacted cerumen, left ear     Impacted cerumen of left ear    Left ventricular ejection fraction greater than 40%     40-45%    Noncompliance     Personal history of other diseases of the respiratory system     History of acute bronchitis    Pulmonary edema     Pulmonary hypertension (CMS/[facility])     Renal carcinoma, right (CMS/[facility])     s/p partial nephrectomy 8/2021    Respiratory failure (CMS/[facility])     Vitamin D deficiency     Weakness       Past Surgical History:   Procedure Laterality Date    ANKLE SURGERY      2013    CARDIAC CATHETERIZATION N/A 2/7/2024    Procedure: Right Heart Cath;  Surgeon: Nicholas Antonio MD;  Location: [facility] Cardiac Cath Lab;  Service: Cardiovascular;  Laterality: N/A;    CATARACT EXTRACTION Right     08/2019     " SECTION, CLASSIC      MR CHEST ANGIO W AND WO IV CONTRAST  2023    MR CHEST ANGIO W AND WO IV CONTRAST 2023 [facility] MRI    MR HEAD ANGIO WO IV CONTRAST  2021    MR HEAD ANGIO WO IV CONTRAST [facility]    NEPHRECTOMY  2021    SHOULDER SURGERY      2009      Nutrition History:  Food and Nutrient History: Pt reports since admission, her appetite is poor.  Pt agreeable to try Nepro ONS in hopes to better meet nutrition needs.  Pt reports she was NPO this morning, but line placement now scheduled for 3/1/2024.  Energy Intake: Poor < 50 %  Allergies   Allergen Reactions    Adhesive Tape-Silicones Other     blisters    Amoxicillin Swelling    Bee Venom Protein (Honey Bee) Swelling    Codeine Headache and Other     Migraines    Doxycycline Unknown    Latex Other     blisters    Lisinopril Swelling     Facial swelling    Prednisone Other     Yeast infection    Citalopram Other and Rash     Facial breakout, blisters, and rash    Oseltamivir Rash    Phenyleph-Shark Oil-Glyc-Pet Rash      GI Symptoms: None     Oral Problems: None       Anthropometrics:  Height: 167.6 cm (5' 6\")   Weight: 99.1 kg (218 lb 7.6 oz)   BMI (Calculated): 35.28  IBW/kg (Dietitian Calculated): 59 kg  Percent of IBW: 168 %       Weight History:   Wt Readings from Last 10 Encounters:   24 99.1 kg (218 lb 7.6 oz)   24 102 kg (224 lb 1.6 oz)   02/15/24 95.8 kg (211 lb 3.2 oz)   24 89.4 kg (197 lb)   24 89.4 kg (197 lb)   24 88.5 kg (195 lb)   23 83.9 kg (185 lb)   10/13/23 81.6 kg (180 lb)   23 77.1 kg (170 lb)   23 80.7 kg (178 lb)       Weight Change %:  Weight History / % Weight Change: 99.1 kg (24); 89.4 kg (24 up 11%/1 month); 83.9 kg (23); 93 kg (23)        Significant Weight Gain: Fluid related    Nutrition Focused Physical Exam Findings:    Subcutaneous Fat Loss:   Orbital Fat Pads: Mild-Moderate (slight dark circles and slight " hollowing)  Buccal Fat Pads: Mild-Moderate (flat cheeks, minimal bounce)  Muscle Wasting:  Temporalis: Mild-Moderate (slight depression)  Pectoralis (Clavicular Region): Mild-Moderate (some protrusion of clavicle)  Edema:  Edema: +1 trace, +2 mild  Edema Location: +1 RUE; +2 LUE; +2 generalized  Physical Findings:  Skin: Positive (R and L pretibial wound; L knee skin tear)    Nutrition Significant Labs:  CBC Trend:   Results from last 7 days   Lab Units 02/29/24  0538 02/28/24  0558 02/27/24  0542 02/25/24  0610   WBC AUTO x10*3/uL 11.6* 10.2 11.4* 7.6   RBC AUTO x10*6/uL 2.64* 2.63* 2.55* 2.68*   HEMOGLOBIN g/dL 7.9* 7.9* 7.6* 7.9*   HEMATOCRIT % 25.8* 25.8* 25.0* 26.1*   MCV fL 98 98 98 97   PLATELETS AUTO x10*3/uL 338 360 338 349    , BMP Trend:   Results from last 7 days   Lab Units 02/29/24  0538 02/28/24  0558 02/27/24  0542 02/25/24  0610   GLUCOSE mg/dL 288* 287* 266* 196*   CALCIUM mg/dL 7.6* 7.8* 7.8* 7.8*   SODIUM mmol/L 135* 136 135* 137   POTASSIUM mmol/L 4.6 5.1 4.8 5.2   CO2 mmol/L 29 26 27 30   CHLORIDE mmol/L 97* 98 97* 94*   BUN mg/dL 106* 106* 106* 102*   CREATININE mg/dL 3.82* 3.88* 3.89* 4.08*    , A1C:  Lab Results   Component Value Date    HGBA1C 7.9 (H) 02/16/2024   , BG POCT trend:   Results from last 7 days   Lab Units 02/29/24  1140 02/29/24  0728 02/28/24  1951 02/28/24  1633 02/28/24  1046   POCT GLUCOSE mg/dL 257* 239* 246* 215* 255*    , TPN/PPN Labs:   Results from last 7 days   Lab Units 02/29/24  0538 02/28/24  0558 02/27/24  0542 02/25/24  0610   GLUCOSE mg/dL 288* 287* 266* 196*   POTASSIUM mmol/L 4.6 5.1 4.8 5.2   PHOSPHORUS mg/dL  --  6.0* 6.0* 5.5*   MAGNESIUM mg/dL  --   --  1.98 2.13   SODIUM mmol/L 135* 136 135* 137   CHLORIDE mmol/L 97* 98 97* 94*    , Lipid Panel:   Lab Results   Component Value Date    CHOL 128 04/27/2023    HDL 49.0 04/27/2023    CHHDL 2.6 04/27/2023    LDLF 59 04/27/2023    VLDL 20 04/27/2023    TRIG 100 04/27/2023    , Vit D: No results found for:  "\"VITD25\" , Vit B12:   Lab Results   Component Value Date    [id number] 308 02/23/2023        Nutrition Specific Medications:      Current Facility-Administered Medications:     acetaminophen (Tylenol) tablet 650 mg, 650 mg, oral, q4h PRN **OR** acetaminophen (Tylenol) oral liquid 650 mg, 650 mg, nasogastric tube, q4h PRN **OR** acetaminophen (Tylenol) suppository 650 mg, 650 mg, rectal, q4h PRN, Roselia Parks MD    acetaminophen (Tylenol) tablet 650 mg, 650 mg, oral, q4h PRN **OR** acetaminophen (Tylenol) oral liquid 650 mg, 650 mg, oral, q4h PRN **OR** acetaminophen (Tylenol) suppository 650 mg, 650 mg, rectal, q4h PRN, Roselia Parks MD    albuterol 90 mcg/actuation inhaler 2 puff, 2 puff, inhalation, q4h PRN, Roselia Parks MD    amiodarone (Pacerone) tablet 200 mg, 200 mg, oral, Daily with breakfast, Roselia Parks MD, 200 mg at 02/29/24 0955    amLODIPine (Norvasc) tablet 5 mg, 5 mg, oral, Daily, Roselia Parks MD, 5 mg at 02/29/24 0955    aspirin EC tablet 81 mg, 81 mg, oral, Daily, Roselia Parks MD, 81 mg at 02/29/24 0956    atorvastatin (Lipitor) tablet 40 mg, 40 mg, oral, Daily, Roselia Parks MD, 40 mg at 02/29/24 0955    [Held by provider] clopidogrel (Plavix) tablet 75 mg, 75 mg, oral, Daily, Roselia Parks MD, 75 mg at 02/29/24 0955    dextrose 10 % in water (D10W) infusion, 0.3 g/kg/hr, intravenous, Once PRN, Roselia Parks MD    dextrose 50 % injection 25 g, 25 g, intravenous, q15 min PRN, Roselia Parks MD, 25 g at 02/20/24 0649    diphenhydrAMINE (BENADryl) injection 25 mg, 25 mg, intravenous, q6h PRN, Roselia Parks MD    ferrous gluconate (Fergon) 324 (38 Fe) mg tablet 324 mg, 324 mg, oral, Daily with breakfast, Roselia Parks MD, 324 mg at 02/29/24 0955    [Held by provider] furosemide (Lasix) 500 mg in 50 mL (10 mg/mL) infusion, 10 mg/hr, intravenous, Continuous, Luis Moffett DO, Last Rate: 1 mL/hr at 02/22/24 1927, 10 mg/hr at 02/22/24 1927    " glucagon (Glucagen) injection 1 mg, 1 mg, intramuscular, q15 min PRN, Roselia Parks MD    [Held by provider] heparin (porcine) injection 5,000 Units, 5,000 Units, subcutaneous, q8h, Roselia Parks MD, 5,000 Units at 02/20/24 0038    [Held by provider] insulin glargine (Lantus) injection 14 Units, 14 Units, subcutaneous, Nightly, Roselia Parks MD    insulin lispro (HumaLOG) injection 0-15 Units, 0-15 Units, subcutaneous, TID with meals, Luis Moffett DO, 9 Units at 02/29/24 1151    ipratropium-albuteroL (Duo-Neb) 0.5-2.5 mg/3 mL nebulizer solution 3 mL, 3 mL, nebulization, TID, Luis Moffett DO, 3 mL at 02/29/24 1454    ipratropium-albuteroL (Duo-Neb) 0.5-2.5 mg/3 mL nebulizer solution 3 mL, 3 mL, nebulization, q2h PRN, Luis Moffett DO    levothyroxine (Synthroid, Levoxyl) tablet 200 mcg, 200 mcg, oral, Daily before breakfast, Roselia Parks MD, 200 mcg at 02/29/24 0626    lidocaine PF (Xylocaine) 10 mg/mL (1 %) injection 50 mg, 5 mL, infiltration, Once, Myke Newsome MD    melatonin tablet 5 mg, 5 mg, oral, Daily, Roselia Parks MD, 5 mg at 02/28/24 2149    metoprolol succinate XL (Toprol-XL) 24 hr tablet 50 mg, 50 mg, oral, BID, Roselia Parks MD, 50 mg at 02/29/24 0955    ondansetron (Zofran) tablet 4 mg, 4 mg, oral, q8h PRN **OR** ondansetron (Zofran) injection 4 mg, 4 mg, intravenous, q8h PRN, Roselia Parks MD    oxygen (O2) therapy, , inhalation, Continuous PRN - O2/gases, Myke Newsome MD    oxygen (O2) therapy, , inhalation, Continuous PRN - O2/gases, Myke Newsome MD, 4 L/min at 02/29/24 0837    pantoprazole (ProtoNix) EC tablet 40 mg, 40 mg, oral, BID, Roselia Parks MD, 40 mg at 02/29/24 0955    polyethylene glycol (Glycolax, Miralax) packet 17 g, 17 g, oral, Daily, Roselia Parks MD, 17 g at 02/28/24 0844    potassium chloride CR (Klor-Con M20) ER tablet 20 mEq, 20 mEq, oral, Daily, Roselia Parks MD    predniSONE  (Deltasone) tablet 40 mg, 40 mg, oral, Daily, Hailey Sue MD, 40 mg at 02/29/24 0956    venlafaxine XR (Effexor-XR) 24 hr capsule 75 mg, 75 mg, oral, Daily, Roselia Parks MD, 75 mg at 02/29/24 0956   Nursing Data Per flowsheet:   Stool Appearance: Soft (02/28/24 1600)  Gastrointestinal  Gastrointestinal (WDL): Exceptions to WDL  Abdomen Inspection: Soft, Rounded (obese)  Abdominal Tenderness: Tenderness  Bowel Sounds: All quadrants  Bowel Sounds (All Quadrants): Active, Present  Bowel Incontinence: Yes  Stool Appearance: Soft  Stool Color: Black, Brown    Intake/Output Summary (Last 24 hours) at 2/29/2024 1537  Last data filed at 2/29/2024 1418  Gross per 24 hour   Intake 880 ml   Output 600 ml   Net 280 ml      Pain Score: 0 - No pain   Dietary Orders (From admission, onward)       Start     Ordered    02/29/24 1208  Oral nutritional supplements  Until discontinued        Comments: Do not count with fluid restriction   Question Answer Comment   Deliver with Dinner    Select supplement: Nepro With Carbsteady        02/29/24 1207    02/29/24 1027  Adult diet Cardiac; 70 gm fat; 2 - 3 grams Sodium  Diet effective now        Question Answer Comment   Diet type Cardiac    Fat restriction: 70 gm fat    Sodium restriction: 2 - 3 grams Sodium        02/29/24 1026                     Estimated Needs:      Method for Estimating Needs: 1770 kcal (30 kcal/kg/IBW)     Method for Estimating Needs: 71-83 g protein (1.2-1.4 g/kg/IBW)     Method for Estimating Needs: per MD       Nutrition Diagnosis   Malnutrition Diagnosis  Patient has Malnutrition Diagnosis: Yes  Diagnosis Status: New  Malnutrition Diagnosis: Severe malnutrition related to acute disease or injury  As Evidenced by: +1-2 edema, consuming less than 50% of estimated energy requirements >/= 5 days, loss of muscle and adipose stores  Additional Assessment Information: ONS added for additional kcal and protein            Nutrition Interventions/Recommendations    Nutrition Prescription:  Individualized Nutrition Prescription Provided for : diet    Nutrition Interventions:   Food and/or Nutrient Delivery Interventions  Interventions: Medical food supplement  Goal: 1 can Nepro daily = 420 kcal, 19 g protein      Coordination of Care: BEATRICE Moncada  Nutrition Education:   Education Documentation  No documentation found.         Recommendations:  Liberalize Cardiac and fat restriction to current diet order- pt reports poor appetite and limited meal options  Weights: daily  RFP, Mg daily; replete lytes prn         Nutrition Monitoring and Evaluation     Food/Nutrient Related History Monitoring  Monitoring and Evaluation Plan: Energy intake  Energy Intake: Estimated energy intake  Criteria: Pt will consume 50% of meals  provided         Biochemical Data, Medical Tests and Procedures  Monitoring and Evaluation Plan: Electrolyte/renal panel  Electrolyte and Renal Panel: BUN, Potassium, Sodium, Calcium, serum, Chloride, Magnesium, Phosphorus  Criteria: RFP, Mg wnl                 Time Spent (min): 60 minutes

## 2024-02-29 NOTE — PROGRESS NOTES
02/29/24 1618   Discharge Planning   Patient expects to be discharged to: [facility] T/[facility]/Sat 0640 [facility], Ilene Alfaro can transport.

## 2024-02-29 NOTE — CARE PLAN
The patient's goals for the shift include      The clinical goals for the shift include Pt will remain HDS this shift    Over the shift, the patient did remain HDS, monitored and medicated as ordered this shift.

## 2024-02-29 NOTE — PROGRESS NOTES
"Daphne Morris is a 68 y.o. female on day 10 of admission presenting with Heart failure (CMS/HCC).      Subjective   Patient seen today. She has no new complaint.     Objective   /74 (BP Location: Right arm, Patient Position: Lying)   Pulse 70   Temp 36.8 °C (98.2 °F) (Temporal)   Resp 17   Ht 1.676 m (5' 6\")   Wt 99.1 kg (218 lb 7.6 oz)   SpO2 96%   BMI 35.26 kg/m²   Wt Readings from Last 3 Encounters:   02/29/24 99.1 kg (218 lb 7.6 oz)   02/18/24 102 kg (224 lb 1.6 oz)   02/15/24 95.8 kg (211 lb 3.2 oz)       Physical Exam      Intake/Output Summary (Last 24 hours) at 2/29/2024 1039  Last data filed at 2/29/2024 1034  Gross per 24 hour   Intake 480 ml   Output 700 ml   Net -220 ml       General appearance: no distress awake and alert on room air, euvolemic on exam  Eyes: non-icteric  HEENT: atrumatic head, PEERLA, moist mucosa  Skin: no apparent rash, Heart: NSR, S1, S2 normal, no murmur or gallop  Lungs: Symmetrical expansion, decreased air entry bilaterally with basal crackles   Abdomen: soft, nt/nd, obese  Extremities: bilateral upper extremity edema with bilateral lower extremity swelling. Extremity tender to touch bu no inflammation   Neuro: No FND,asterixis, no focal deficits noticed. Has flapping tremors      Review of Systems     Constitutional: no fever, no chills, no recent weight gain and no recent weight loss.   Eyes: no blurred vision and no diplopia.   ENT: no hearing loss, no earache, no sore throat, no swollen glands in the neck and no nasal discharge.   Cardiovascular: no chest pain, no palpitations and no lower extremity edema.   Respiratory: no shortness of breath, no chronic cough and no shortness of breath during exertion.   Gastrointestinal: no abdominal pain, no constipation, no heartburn, no vomiting, no bloody stools and no change in bowel movements.   Genitourinary: no dysuria and no hematuria.   Musculoskeletal: no arthralgias and no myalgias.   Skin: no rashes and no skin " "lesions.   Neurological: no headaches and no dizziness.   Psychiatric: no confusion, no depression and no anxiety.   Endocrine: no heat intolerance, no cold intolerance, appetite not increased, no thyroid disorder, no increased urinary frequency and no dry skin.   Hematologic/Lymphatic: does not bleed easily and does not bruise easily.   All other systems have been reviewed and are negative for complaint.         Data Review        Results from last 7 days   Lab Units 02/29/24  0538 02/28/24  0558 02/27/24  0542   WBC AUTO x10*3/uL 11.6* 10.2 11.4*   HEMOGLOBIN g/dL 7.9* 7.9* 7.6*   HEMATOCRIT % 25.8* 25.8* 25.0*   PLATELETS AUTO x10*3/uL 338 360 338            No results found for: \"URICACID\"        Lab Results   Component Value Date    HGBA1C 7.9 (H) 02/16/2024           Results from last 7 days   Lab Units 02/29/24  0538 02/28/24  0558 02/27/24  0542 02/25/24  0610 02/24/24  0507 02/23/24  0533   SODIUM mmol/L 135* 136 135* 137 133* 133*   POTASSIUM mmol/L 4.6 5.1 4.8 5.2 4.9 4.9   CHLORIDE mmol/L 97* 98 97* 94* 94* 94*   CO2 mmol/L 29 26 27 30 29 27   BUN mg/dL 106* 106* 106* 102* 88* 82*   GLUCOSE mg/dL 288* 287* 266* 196* 251* 217*   CALCIUM mg/dL 7.6* 7.8* 7.8* 7.8* 7.6* 7.8*   ANION GAP mmol/L 14 17 16 18 15 17   CREATININE mg/dL 3.82* 3.88* 3.89* 4.08* 4.09* 3.93*   EGFR mL/min/1.73m*2 12* 12* 12* 11* 11* 12*           Albumin/Creatine Ratio   Date Value Ref Range Status   02/01/2024   Final     Comment:     One or more analytes used in this calculation is outside of the analytical measurement range. Calculation cannot be performed.            RFP  Recent Labs     02/29/24  0538 02/28/24  0558 02/27/24  0542 02/25/24  0610 02/24/24  0507 02/23/24  0533 02/22/24  0454 02/21/24  0614   * 136 135* 137 133* 133* 132* 134*   K 4.6 5.1 4.8 5.2 4.9 4.9 5.1 5.0   CL 97* 98 97* 94* 94* 94* 94* 96*   CO2 29 26 27 30 29 27 29 29   * 106* 106* 102* 88* 82* 78* 69*   CREATININE 3.82* 3.88* 3.89* 4.08* 4.09* " 3.93* 3.75* 3.51*   GLUCOSE 288* 287* 266* 196* 251* 217* 267* 179*   CALCIUM 7.6* 7.8* 7.8* 7.8* 7.6* 7.8* 7.5* 7.6*   PHOS  --  6.0* 6.0* 5.5* 5.5* 5.5* 6.0* 5.8*   EGFR 12* 12* 12* 11* 11* 12* 13* 14*   ANIONGAP 14 17 16 18 15 17 14 14        Urineanalysis  Recent Labs     02/19/24  1543 02/09/24  1248 02/01/24  0515 07/19/23  1437 04/26/23  1740 05/03/21 2035 04/23/21  0310 04/06/21  1200 04/10/18  1415   COLORU Yellow Yellow Yellow STRAW YELLOW YELLOW YELLOW PALE YELLOW YELLOW   APPEARANCEU Hazy* Clear Clear CLEAR CLEAR CLEAR HAZY HAZY CLOUDY   SPECGRAVU 1.016 1.011 1.013 1.008 1.009 1.012 1.020 1.005 1.017   SYLVIE 5.0 5.0 5.0 6.0 6.0 6.5 6.0 6.5 5.5   PROTUR >=500 (3+)* >=500 (3+)* >=500 (3+)* >=500 (3+)* 100(2+)* 300* 600* 100* 30*   GLUCOSEU 50 (1+)* 150 (2+)* >=500 (3+)* 150 (2+)* 50(1+)* 100* 150* 50* 300*   BLOODU NEGATIVE MODERATE (2+)* NEGATIVE LARGE (3+)* SMALL(1+)* TRACE* TRACE* MODERATE* TRACE*   KETONESU 5 (TRACE)* NEGATIVE NEGATIVE NEGATIVE NEGATIVE NEGATIVE NEGATIVE NEGATIVE NEGATIVE   BILIRUBINU NEGATIVE NEGATIVE NEGATIVE NEGATIVE NEGATIVE NEGATIVE NEGATIVE NEGATIVE NEGATIVE   NITRITEU NEGATIVE NEGATIVE NEGATIVE NEGATIVE NEGATIVE NEGATIVE NEGATIVE POSITIVE* NEGATIVE   LEUKOCYTESU NEGATIVE TRACE* NEGATIVE NEGATIVE NEGATIVE NEGATIVE SMALL* LARGE* LARGE*       Urine Electrolytes  Recent Labs     02/19/24  1543 02/09/24  1248 02/01/24  0515 07/19/23  1437 04/30/23  1316 04/26/23  1740 05/03/21 2035 04/23/21  0310 04/06/21  1200 04/10/18  1415   SODIUMURR  --   --  40  --  27  --   --   --   --   --    NACREATUR  --   --  75  --  27  --   --   --   --   --    KUR  --   --  64  --   --   --   --   --   --   --    KCREATUR  --   --  119  --   --   --   --   --   --   --    CREATU  --   --  53.6  53.6  --  101.0  --   --   --   --   --    PROTUR >=500 (3+)* >=500 (3+)* >=500 (3+)* >=500 (3+)*  --  100(2+)* 300* 600* 100* 30*   ALBUMINUR  --   --  >2,250.0  --   --   --   --   --   --   --         Urine  Micro  Recent Labs     02/19/24  1543 02/09/24  1248 02/01/24  0515 07/19/23  1437 04/26/23  1740 05/03/21  2035 04/23/21  0310 04/06/21  1200 04/10/18  1415   WBCU 1-5 6-10* 1-5 5* 1 NONE SEEN 53* LOADED 1,077*   RBCU 1-2 11-20* 3-5 >182* 1 2-4 5* 16-25 5*   HYALCASTU  --  2+* OCCASIONAL*  --   --   --  70  --  3   SQUAMEPIU 1-9 (SPARSE) 1-9 (SPARSE)  --  1 1  --  MODERATE  --  FEW   BACTERIAU 1+* 1+* 1+* 1+*  --   --  POSITIVE PRESENT  Performed at [facility] [address] [facility] [address]   POSITIVE   MUCUSU  --  FEW FEW FEW  --   --   --   --   --         Iron  Recent Labs     02/20/24  0604 05/02/23  0607 04/28/23  0615 04/27/23  0624 02/23/23  1113   IRON 24*  --   --  26* 54   TIBC 214*  --   --  289 281   IRONSAT 11*  --   --  9* 19.2   FERRITIN 234* 71 80  --  42        amiodarone, 200 mg, oral, Daily with breakfast  amLODIPine, 5 mg, oral, Daily  aspirin, 81 mg, oral, Daily  atorvastatin, 40 mg, oral, Daily  clopidogrel, 75 mg, oral, Daily  ferrous gluconate, 324 mg, oral, Daily with breakfast  [Held by provider] heparin (porcine), 5,000 Units, subcutaneous, q8h  [Held by provider] insulin glargine, 14 Units, subcutaneous, Nightly  insulin lispro, 0-15 Units, subcutaneous, TID with meals  ipratropium-albuteroL, 3 mL, nebulization, TID  levothyroxine, 200 mcg, oral, Daily before breakfast  lidocaine, 5 mL, infiltration, Once  melatonin, 5 mg, oral, Daily  metoprolol succinate XL, 50 mg, oral, BID  pantoprazole, 40 mg, oral, BID  polyethylene glycol, 17 g, oral, Daily  potassium chloride CR, 20 mEq, oral, Daily  predniSONE, 40 mg, oral, Daily  venlafaxine XR, 75 mg, oral, Daily      Assessment and Plan   Daphne Morris  is a 68 y.o. female who has past medical history of  heart failure, atrial fibrillation s/p Watchman, right nephrectomy, hypertension, type 2 DM admitted for acute hypoxic respiratory failure 2/2 to pneumonia and acute decompensated heart failure.        # ANDREA on CKD   -Likely   Cardiorenal syndrome 2/2 Acute decompensated CHF  -(CKD) 4/A3-baseline SCr 1.8-2.5 most likely atherosclerotic cardiovascular disease/diabetic nephropathy, prior nephrectomy.  She follows with Dr. Mitchell  - Peaked Scr during this admission 3-4.09, GFR 11   -Kidney ultrasound with no obstruction on the left side.  Right total nephrectomy noticed     Plan  -Patient will need intermittent hemodialysis, on account of persistently worsening renal function, refractory to diuretic therapy.  -Long discussion with patient on need to dialyze, she was also informed that although dialysis may increase life expectancy, may not necessarily improve quality of life.  -Discussed with patient's primary nephrologist Dr. Harris, who is agreeable to plan for IHD.  And in the event status patient consents to dialysis, subsequent dialysis sessions would be at [facility]  - Patient is agreeable to dialysis   -Plans remain as is, obtain tunneled dialysis catheter and initiate dialysis tomorrow   -Recommend permanent discontinuation of potassium supplements      Jazmine Segal MD

## 2024-02-29 NOTE — PROGRESS NOTES
Daphne Morris is a 68 y.o. female on day 10 of admission presenting with Heart failure (CMS/HCC).      Subjective   Seen and examined, no new complaints.  No overnight events.  The plan discussed with the patient and RN.    Objective     Last Recorded Vitals  /76 (BP Location: Left arm, Patient Position: Lying)   Pulse 71   Temp 36.6 °C (97.9 °F) (Temporal)   Resp 18   Wt 99.1 kg (218 lb 7.6 oz)   SpO2 98%   Intake/Output last 3 Shifts:    Intake/Output Summary (Last 24 hours) at 2/29/2024 1635  Last data filed at 2/29/2024 1418  Gross per 24 hour   Intake 880 ml   Output 600 ml   Net 280 ml       Admission Weight  Weight: 95.4 kg (210 lb 5.1 oz) (02/19/24 0438)    Daily Weight  02/29/24 : 99.1 kg (218 lb 7.6 oz)    Image Results  ECG 12 Lead  Junctional rhythm  Left axis deviation  Inferior infarct , age undetermined  Anterolateral infarct (cited on or before 01-FEB-2024)  Abnormal ECG  When compared with ECG of 01-FEB-2024 02:46,  Junctional rhythm has replaced Sinus rhythm  Questionable change in initial forces of Lateral leads  See ED provider note for full interpretation and clinical correlation  Confirmed by Rose Viera ([id number]) on 2/24/2024 5:48:25 PM      Physical Exam  Constitutional: patient is alert and cooperative with exam  skin: dry and warm  Eyes: EOMI and clear sclera  ENMT: moist mucous membranes  Head/Neck: neck supple   Respiratory/Thorax: Clear to auscultation bilaterally, no wheezing or crackles appreciated  Cardiovascular: regular rate and rhythm, S1 and S2 present, no murmurs heard  Gastrointestinal: bowel sounds present, no pain or tenderness upon palpation, soft and nondistended  Extremities: +2 radial, posterior tibial, and pedal pulse, no lower extremity edema noted  Neurological: AxOx3    Relevant Results  Scheduled medications  amiodarone, 200 mg, oral, Daily with breakfast  amLODIPine, 5 mg, oral, Daily  ammonium lactate, , Topical, BID  aspirin, 81 mg, oral,  Daily  atorvastatin, 40 mg, oral, Daily  [Held by provider] clopidogrel, 75 mg, oral, Daily  ferrous gluconate, 324 mg, oral, Daily with breakfast  [Held by provider] heparin (porcine), 5,000 Units, subcutaneous, q8h  [Held by provider] insulin glargine, 14 Units, subcutaneous, Nightly  insulin lispro, 0-15 Units, subcutaneous, TID with meals  ipratropium-albuteroL, 3 mL, nebulization, TID  levothyroxine, 200 mcg, oral, Daily before breakfast  lidocaine, 5 mL, infiltration, Once  melatonin, 5 mg, oral, Daily  metoprolol succinate XL, 50 mg, oral, BID  pantoprazole, 40 mg, oral, BID  polyethylene glycol, 17 g, oral, Daily  potassium chloride CR, 20 mEq, oral, Daily  predniSONE, 40 mg, oral, Daily  venlafaxine XR, 75 mg, oral, Daily      Continuous medications  [Held by provider] furosemide, 10 mg/hr, Last Rate: 10 mg/hr (02/22/24 1927)      PRN medications  PRN medications: acetaminophen **OR** acetaminophen **OR** acetaminophen, acetaminophen **OR** acetaminophen **OR** acetaminophen, albuterol, dextrose 10 % in water (D10W), dextrose, diphenhydrAMINE, glucagon, ipratropium-albuteroL, ondansetron **OR** ondansetron, oxygen, oxygen    Results for orders placed or performed during the hospital encounter of 02/19/24 (from the past 24 hour(s))   POCT GLUCOSE   Result Value Ref Range    POCT Glucose 246 (H) 74 - 99 mg/dL   CBC and Auto Differential   Result Value Ref Range    WBC 11.6 (H) 4.4 - 11.3 x10*3/uL    nRBC 0.0 0.0 - 0.0 /100 WBCs    RBC 2.64 (L) 4.00 - 5.20 x10*6/uL    Hemoglobin 7.9 (L) 12.0 - 16.0 g/dL    Hematocrit 25.8 (L) 36.0 - 46.0 %    MCV 98 80 - 100 fL    MCH 29.9 26.0 - 34.0 pg    MCHC 30.6 (L) 32.0 - 36.0 g/dL    RDW 14.0 11.5 - 14.5 %    Platelets 338 150 - 450 x10*3/uL    Neutrophils % 90.0 40.0 - 80.0 %    Immature Granulocytes %, Automated 0.7 0.0 - 0.9 %    Lymphocytes % 2.9 13.0 - 44.0 %    Monocytes % 6.3 2.0 - 10.0 %    Eosinophils % 0.0 0.0 - 6.0 %    Basophils % 0.1 0.0 - 2.0 %     Neutrophils Absolute 10.40 (H) 1.20 - 7.70 x10*3/uL    Immature Granulocytes Absolute, Automated 0.08 0.00 - 0.70 x10*3/uL    Lymphocytes Absolute 0.33 (L) 1.20 - 4.80 x10*3/uL    Monocytes Absolute 0.73 0.10 - 1.00 x10*3/uL    Eosinophils Absolute 0.00 0.00 - 0.70 x10*3/uL    Basophils Absolute 0.01 0.00 - 0.10 x10*3/uL   Basic metabolic panel   Result Value Ref Range    Glucose 288 (H) 74 - 99 mg/dL    Sodium 135 (L) 136 - 145 mmol/L    Potassium 4.6 3.5 - 5.3 mmol/L    Chloride 97 (L) 98 - 107 mmol/L    Bicarbonate 29 21 - 32 mmol/L    Anion Gap 14 10 - 20 mmol/L    Urea Nitrogen 106 (HH) 6 - 23 mg/dL    Creatinine 3.82 (H) 0.50 - 1.05 mg/dL    eGFR 12 (L) >60 mL/min/1.73m*2    Calcium 7.6 (L) 8.6 - 10.3 mg/dL   POCT GLUCOSE   Result Value Ref Range    POCT Glucose 239 (H) 74 - 99 mg/dL   POCT GLUCOSE   Result Value Ref Range    POCT Glucose 257 (H) 74 - 99 mg/dL   POCT GLUCOSE   Result Value Ref Range    POCT Glucose 216 (H) 74 - 99 mg/dL       Assessment/Plan      Principal Problem:    Heart failure (CMS/[facility])  Active Problems:    Stage 3b chronic kidney disease (CKD) (CMS/[facility])    Daphne Morris is a 68 y.o. female presenting with past medical history of heart failure, atrial fibrillation, Hypertension, type 2 DM transfer from [place] ER due to acute hypoxic respiratory failure secondary to pneumonia, COPD exacerbation and acute diastolic heart failure.     #Stage III/IV chronic kidney disease  Status post right nephrectomy  Nephrology on board, family are agreeable with hemodialysis, unable to get a dialysis catheter over the weekend,   - Consult General surgery pending line placement for HD start, Dr. Kim will place the line today      #Acute hypoxic respiratory failure secondary to acute on chronic diastolic heart failure and influenza B pneumonia  -Leukocytosis today we will continue to monitor  -Cardiology on board, unable to do left heart cath due to elevated creatinine, unable to start SGLT2 inhibitors  as well  -Pulmonary on board plan to continue prednisone, breathing treatment, patient finished Tamiflu      #Anemia  -Status post 1 unit of blood   -FOBT positive  -A iron saturation of 11 iron level of 24 ferritin in 200  -On ferrous gluconate      #Paroxysmal atrial fibrillation  Status post watchman in August 2023  Currently in sinus rhythm  Continue amiodarone and metoprolol     #Type 2 diabetes mellitus  Continue insulin sliding scale  Holding Lantus due to limited oral intake  Holding Januvia  Continue diabetic diet  Follow Accu-Cheks     #Hypertension  Metoprolol      #GERD  -PPI     #yperlipidemia  Atorvastatin     #Depression  Venlafaxine     #Hypothyroidism  Continue Synthroid     #DVT prophylaxis  Heparin     #Disposition  -Patient met with hospice, the patient and her family are not ready to sign with hospice right now.  -CODE STATUS changed to DNR/DNI  -Nephrology discussed with the family hemodialysis and they were agreeable  -Plan to consult General surgery to get dialysis catheter and to start hemodialysis     Myke Newsome MD

## 2024-02-29 NOTE — PROGRESS NOTES
Occupational Therapy    Occupational Therapy Treatment    Name: Daphne Morris  MRN: [id number]  : 1955  Date: 24  Time Calculation  Start Time: 1456  Stop Time: 1520  Time Calculation (min): 24 min    Assessment:  OT Assessment: Pt presents with decreased ADL performance, decreased functional mobility, decreased endurance. Continued skilled OT recommended to maximize pt safety and independence.  Prognosis: Fair  Barriers to Discharge: None  Evaluation/Treatment Tolerance: Patient limited by fatigue  Medical Staff Made Aware: Yes  End of Session Communication: Bedside nurse  End of Session Patient Position: Bed, 3 rail up, Alarm on  Plan:  Treatment Interventions: ADL retraining, Endurance training, Patient/family training  OT Frequency: 3 times per week  OT Discharge Recommendations: Moderate intensity level of continued care  Equipment Recommended upon Discharge:  (TBD)  OT Recommended Transfer Status: Assist of 2  OT - OK to Discharge: Yes (in accord with OT POC)    Subjective   Previous Visit Info:  OT Last Visit  OT Received On: 24  General:  General  Reason for Referral: Impaired functional mobility; (+) flu  Referred By: Myke Newsome MD  Past Medical History Relevant to Rehab: heart failure with preserved ejection fraction, chronic respiratory failure secondary to CHF/COPD/pleural effusion 2 L of oxygen at baseline, history of A-fib, hypertension, prior CVA anemia, CKD stage IV, DM type II, anemia, hypothyroidism, and pericardial effusion who presented to the ED from her nursing facility with concern of dyspnea and hypoxia, patient was recently admitted to [facility] on -02/15 for AHRF, COPDae, ANDREA/cardiorenal syndrome she received Lasix and underwent right thoracentesis as well as diagnostic right heart cath which showed mild pulmonary hypertension  Family/Caregiver Present: No  Prior to Session Communication: Bedside nurse, Care Coordinator (pt unable to have  dialyisis catheter placed today, okay to treat to tolerance.)  Patient Position Received: Bed, 3 rail up, Alarm on  Preferred Learning Style: verbal, visual  General Comment: Patient agreeable to limited OT treatment with encouragement. Awaiting dialyisis port placement probable tomorrow.  P  recautions:  Medical Precautions: Fall precautions (3 liters O2 via nasal cannula, telemetry, vega catheter, cradle boots.)    Pain Assessment:    Denied pain    Objective   Activities of Daily Living: Grooming  Grooming Level of Assistance: Setup, Close supervision  Grooming Where Assessed: Bed level  Grooming Comments: Pt provided with prepped washcloth for face washing and hairbrush for hair care task. Multiple breaks required for basic task steps.    UE Dressing  UE Dressing Level of Assistance: Moderate assistance (Assist needed to thread UE's through sleeves of clean gown and accomodate multiple lines with pt in bed positioned in semi recumbant position. Limb support required d/t UE weakness bilaterally. Multiple breaks required within course of task.)  UE Dressing Where Assessed: Bed level  UE Dressing Comments: Pt rapidly fatigued in course of activity.    Outcome Measures:  [facility] Daily Activity  Putting on and taking off regular lower body clothing: Total  Bathing (including washing, rinsing, drying): A lot  Putting on and taking off regular upper body clothing: A lot  Toileting, which includes using toilet, bedpan or urinal: Total  Taking care of personal grooming such as brushing teeth: A lot  Eating Meals: A little  Daily Activity - Total Score: 11    Education Documentation  Body Mechanics, taught by DAYO England at 2/29/2024  3:52 PM.  Learner: Patient  Readiness: Acceptance  Method: Explanation, Demonstration  Response: Verbalizes Understanding, Demonstrated Understanding, Needs Reinforcement  Comment: Pt educated on use of energy conservation and work simplification within course of basic UB ADL  tasks.    Precautions, taught by DAYO England at 2/29/2024  3:52 PM.  Learner: Patient  Readiness: Acceptance  Method: Explanation, Demonstration  Response: Verbalizes Understanding, Demonstrated Understanding, Needs Reinforcement  Comment: Pt educated on use of energy conservation and work simplification within course of basic UB ADL tasks.    ADL Training, taught by DAYO England at 2/29/2024  3:52 PM.  Learner: Patient  Readiness: Acceptance  Method: Explanation, Demonstration  Response: Verbalizes Understanding, Demonstrated Understanding, Needs Reinforcement  Comment: Pt educated on use of energy conservation and work simplification within course of basic UB ADL tasks.    Education Comments  No comments found.      Goals:  Encounter Problems       Encounter Problems (Active)       OT Goals       Pt will demo UE ADL completion with set-up/supervision using AE if needed.  (Progressing)       Start:  02/21/24    Expected End:  02/29/24            Pt will increase endurance to tolerate 10min of OOB activity with no more than 1 rest break in order to increase ability to engage in ADL completion.  (Progressing)       Start:  02/21/24    Expected End:  02/29/24            Pt will increase static/dynamic sitting to Good to increase safety and independence with functional task completion.   (Progressing)       Start:  02/21/24    Expected End:  02/29/24            Pt will complete [id number] transfers using LRD in preparation for ADLs with Min A (Progressing)       Start:  02/21/24    Expected End:  02/29/24

## 2024-03-01 ENCOUNTER — APPOINTMENT (OUTPATIENT)
Dept: RADIOLOGY | Facility: HOSPITAL | Age: 69
DRG: 291 | End: 2024-03-01
Payer: MEDICARE

## 2024-03-01 ENCOUNTER — ANESTHESIA (OUTPATIENT)
Dept: OPERATING ROOM | Facility: HOSPITAL | Age: 69
DRG: 291 | End: 2024-03-01
Payer: MEDICARE

## 2024-03-01 ENCOUNTER — APPOINTMENT (OUTPATIENT)
Dept: DIALYSIS | Facility: HOSPITAL | Age: 69
End: 2024-03-01
Payer: MEDICARE

## 2024-03-01 LAB
ALBUMIN SERPL BCP-MCNC: 2.6 G/DL (ref 3.4–5)
ANION GAP SERPL CALC-SCNC: 13 MMOL/L (ref 10–20)
BASOPHILS # BLD AUTO: 0.01 X10*3/UL (ref 0–0.1)
BASOPHILS NFR BLD AUTO: 0.1 %
BUN SERPL-MCNC: 105 MG/DL (ref 6–23)
CALCIUM SERPL-MCNC: 7.7 MG/DL (ref 8.6–10.3)
CHLORIDE SERPL-SCNC: 99 MMOL/L (ref 98–107)
CO2 SERPL-SCNC: 30 MMOL/L (ref 21–32)
CREAT SERPL-MCNC: 3.67 MG/DL (ref 0.5–1.05)
EGFRCR SERPLBLD CKD-EPI 2021: 13 ML/MIN/1.73M*2
EOSINOPHIL # BLD AUTO: 0 X10*3/UL (ref 0–0.7)
EOSINOPHIL NFR BLD AUTO: 0 %
ERYTHROCYTE [DISTWIDTH] IN BLOOD BY AUTOMATED COUNT: 13.9 % (ref 11.5–14.5)
GLUCOSE BLD MANUAL STRIP-MCNC: 185 MG/DL (ref 74–99)
GLUCOSE BLD MANUAL STRIP-MCNC: 185 MG/DL (ref 74–99)
GLUCOSE BLD MANUAL STRIP-MCNC: 275 MG/DL (ref 74–99)
GLUCOSE BLD MANUAL STRIP-MCNC: 288 MG/DL (ref 74–99)
GLUCOSE BLD MANUAL STRIP-MCNC: 299 MG/DL (ref 74–99)
GLUCOSE SERPL-MCNC: 308 MG/DL (ref 74–99)
HCT VFR BLD AUTO: 25.3 % (ref 36–46)
HGB BLD-MCNC: 7.7 G/DL (ref 12–16)
IMM GRANULOCYTES # BLD AUTO: 0.11 X10*3/UL (ref 0–0.7)
IMM GRANULOCYTES NFR BLD AUTO: 1.1 % (ref 0–0.9)
LYMPHOCYTES # BLD AUTO: 0.33 X10*3/UL (ref 1.2–4.8)
LYMPHOCYTES NFR BLD AUTO: 3.3 %
MAGNESIUM SERPL-MCNC: 2.24 MG/DL (ref 1.6–2.4)
MCH RBC QN AUTO: 29.6 PG (ref 26–34)
MCHC RBC AUTO-ENTMCNC: 30.4 G/DL (ref 32–36)
MCV RBC AUTO: 97 FL (ref 80–100)
MONOCYTES # BLD AUTO: 0.75 X10*3/UL (ref 0.1–1)
MONOCYTES NFR BLD AUTO: 7.6 %
NEUTROPHILS # BLD AUTO: 8.69 X10*3/UL (ref 1.2–7.7)
NEUTROPHILS NFR BLD AUTO: 87.9 %
NRBC BLD-RTO: 0 /100 WBCS (ref 0–0)
PHOSPHATE SERPL-MCNC: 5.3 MG/DL (ref 2.5–4.9)
PLATELET # BLD AUTO: 330 X10*3/UL (ref 150–450)
POTASSIUM SERPL-SCNC: 4.7 MMOL/L (ref 3.5–5.3)
RBC # BLD AUTO: 2.6 X10*6/UL (ref 4–5.2)
SODIUM SERPL-SCNC: 137 MMOL/L (ref 136–145)
WBC # BLD AUTO: 9.9 X10*3/UL (ref 4.4–11.3)

## 2024-03-01 PROCEDURE — 94640 AIRWAY INHALATION TREATMENT: CPT

## 2024-03-01 PROCEDURE — 2720000007 HC OR 272 NO HCPCS: Performed by: SURGERY

## 2024-03-01 PROCEDURE — 2500000004 HC RX 250 GENERAL PHARMACY W/ HCPCS (ALT 636 FOR OP/ED): Performed by: SURGERY

## 2024-03-01 PROCEDURE — 3700000002 HC GENERAL ANESTHESIA TIME - EACH INCREMENTAL 1 MINUTE: Performed by: SURGERY

## 2024-03-01 PROCEDURE — 80069 RENAL FUNCTION PANEL: CPT | Performed by: INTERNAL MEDICINE

## 2024-03-01 PROCEDURE — 2500000001 HC RX 250 WO HCPCS SELF ADMINISTERED DRUGS (ALT 637 FOR MEDICARE OP): Performed by: INTERNAL MEDICINE

## 2024-03-01 PROCEDURE — 1200000002 HC GENERAL ROOM WITH TELEMETRY DAILY

## 2024-03-01 PROCEDURE — 7100000001 HC RECOVERY ROOM TIME - INITIAL BASE CHARGE: Performed by: SURGERY

## 2024-03-01 PROCEDURE — 99233 SBSQ HOSP IP/OBS HIGH 50: CPT | Performed by: INTERNAL MEDICINE

## 2024-03-01 PROCEDURE — 02HV33Z INSERTION OF INFUSION DEVICE INTO SUPERIOR VENA CAVA, PERCUTANEOUS APPROACH: ICD-10-PCS | Performed by: SURGERY

## 2024-03-01 PROCEDURE — 2500000004 HC RX 250 GENERAL PHARMACY W/ HCPCS (ALT 636 FOR OP/ED): Performed by: INTERNAL MEDICINE

## 2024-03-01 PROCEDURE — A36558 PR INSERT TUNNELED CV CATH W/O PORT OR PUMP: Performed by: NURSE ANESTHETIST, CERTIFIED REGISTERED

## 2024-03-01 PROCEDURE — 8010000001 HC DIALYSIS - HEMODIALYSIS PER DAY

## 2024-03-01 PROCEDURE — 3600000007 HC OR TIME - EACH INCREMENTAL 1 MINUTE - PROCEDURE LEVEL TWO: Performed by: SURGERY

## 2024-03-01 PROCEDURE — 36558 INSERT TUNNELED CV CATH: CPT | Performed by: SURGERY

## 2024-03-01 PROCEDURE — 7100000002 HC RECOVERY ROOM TIME - EACH INCREMENTAL 1 MINUTE: Performed by: SURGERY

## 2024-03-01 PROCEDURE — 2500000002 HC RX 250 W HCPCS SELF ADMINISTERED DRUGS (ALT 637 FOR MEDICARE OP, ALT 636 FOR OP/ED): Performed by: INTERNAL MEDICINE

## 2024-03-01 PROCEDURE — 94760 N-INVAS EAR/PLS OXIMETRY 1: CPT

## 2024-03-01 PROCEDURE — 2500000005 HC RX 250 GENERAL PHARMACY W/O HCPCS: Performed by: SURGERY

## 2024-03-01 PROCEDURE — 71045 X-RAY EXAM CHEST 1 VIEW: CPT | Performed by: STUDENT IN AN ORGANIZED HEALTH CARE EDUCATION/TRAINING PROGRAM

## 2024-03-01 PROCEDURE — 2500000001 HC RX 250 WO HCPCS SELF ADMINISTERED DRUGS (ALT 637 FOR MEDICARE OP)

## 2024-03-01 PROCEDURE — 76937 US GUIDE VASCULAR ACCESS: CPT | Performed by: SURGERY

## 2024-03-01 PROCEDURE — 2500000001 HC RX 250 WO HCPCS SELF ADMINISTERED DRUGS (ALT 637 FOR MEDICARE OP): Performed by: NURSE PRACTITIONER

## 2024-03-01 PROCEDURE — 2780000003 HC OR 278 NO HCPCS: Performed by: SURGERY

## 2024-03-01 PROCEDURE — C1788 PORT, INDWELLING, IMP: HCPCS | Performed by: SURGERY

## 2024-03-01 PROCEDURE — 71045 X-RAY EXAM CHEST 1 VIEW: CPT

## 2024-03-01 PROCEDURE — 77001 FLUOROGUIDE FOR VEIN DEVICE: CPT | Performed by: SURGERY

## 2024-03-01 PROCEDURE — 0JH63XZ INSERTION OF TUNNELED VASCULAR ACCESS DEVICE INTO CHEST SUBCUTANEOUS TISSUE AND FASCIA, PERCUTANEOUS APPROACH: ICD-10-PCS | Performed by: SURGERY

## 2024-03-01 PROCEDURE — 3600000002 HC OR TIME - INITIAL BASE CHARGE - PROCEDURE LEVEL TWO: Performed by: SURGERY

## 2024-03-01 PROCEDURE — 76000 FLUOROSCOPY <1 HR PHYS/QHP: CPT

## 2024-03-01 PROCEDURE — 3700000001 HC GENERAL ANESTHESIA TIME - INITIAL BASE CHARGE: Performed by: SURGERY

## 2024-03-01 PROCEDURE — 2500000005 HC RX 250 GENERAL PHARMACY W/O HCPCS: Performed by: INTERNAL MEDICINE

## 2024-03-01 PROCEDURE — 2500000004 HC RX 250 GENERAL PHARMACY W/ HCPCS (ALT 636 FOR OP/ED): Performed by: NURSE ANESTHETIST, CERTIFIED REGISTERED

## 2024-03-01 PROCEDURE — 82947 ASSAY GLUCOSE BLOOD QUANT: CPT

## 2024-03-01 PROCEDURE — 83735 ASSAY OF MAGNESIUM: CPT | Performed by: INTERNAL MEDICINE

## 2024-03-01 PROCEDURE — 85025 COMPLETE CBC W/AUTO DIFF WBC: CPT | Performed by: INTERNAL MEDICINE

## 2024-03-01 PROCEDURE — 2500000005 HC RX 250 GENERAL PHARMACY W/O HCPCS: Performed by: NURSE ANESTHETIST, CERTIFIED REGISTERED

## 2024-03-01 DEVICE — IMPLANTABLE DEVICE: Type: IMPLANTABLE DEVICE | Site: CHEST | Status: FUNCTIONAL

## 2024-03-01 RX ORDER — LIDOCAINE HYDROCHLORIDE 20 MG/ML
INJECTION, SOLUTION INFILTRATION; PERINEURAL AS NEEDED
Status: DISCONTINUED | OUTPATIENT
Start: 2024-03-01 | End: 2024-03-01

## 2024-03-01 RX ORDER — HEPARIN SODIUM 1000 [USP'U]/ML
1700 INJECTION, SOLUTION INTRAVENOUS; SUBCUTANEOUS
Status: DISCONTINUED | OUTPATIENT
Start: 2024-03-01 | End: 2024-03-05 | Stop reason: HOSPADM

## 2024-03-01 RX ORDER — CEFAZOLIN 1 G/1
INJECTION, POWDER, FOR SOLUTION INTRAVENOUS AS NEEDED
Status: DISCONTINUED | OUTPATIENT
Start: 2024-03-01 | End: 2024-03-01

## 2024-03-01 RX ORDER — FENTANYL CITRATE 50 UG/ML
INJECTION, SOLUTION INTRAMUSCULAR; INTRAVENOUS AS NEEDED
Status: DISCONTINUED | OUTPATIENT
Start: 2024-03-01 | End: 2024-03-01

## 2024-03-01 RX ORDER — PROPOFOL 10 MG/ML
INJECTION, EMULSION INTRAVENOUS AS NEEDED
Status: DISCONTINUED | OUTPATIENT
Start: 2024-03-01 | End: 2024-03-01

## 2024-03-01 RX ORDER — HEPARIN SODIUM 1000 [USP'U]/ML
1800 INJECTION, SOLUTION INTRAVENOUS; SUBCUTANEOUS
Status: DISCONTINUED | OUTPATIENT
Start: 2024-03-01 | End: 2024-03-05 | Stop reason: HOSPADM

## 2024-03-01 RX ORDER — TRAMADOL HYDROCHLORIDE 50 MG/1
50 TABLET ORAL ONCE
Status: DISCONTINUED | OUTPATIENT
Start: 2024-03-01 | End: 2024-03-01

## 2024-03-01 RX ORDER — TRAMADOL HYDROCHLORIDE 50 MG/1
50 TABLET ORAL ONCE
Status: COMPLETED | OUTPATIENT
Start: 2024-03-01 | End: 2024-03-01

## 2024-03-01 RX ORDER — SODIUM CHLORIDE, SODIUM LACTATE, POTASSIUM CHLORIDE, CALCIUM CHLORIDE 600; 310; 30; 20 MG/100ML; MG/100ML; MG/100ML; MG/100ML
INJECTION, SOLUTION INTRAVENOUS CONTINUOUS PRN
Status: DISCONTINUED | OUTPATIENT
Start: 2024-03-01 | End: 2024-03-01

## 2024-03-01 RX ADMIN — Medication 5 MG: at 20:05

## 2024-03-01 RX ADMIN — SODIUM CHLORIDE, POTASSIUM CHLORIDE, SODIUM LACTATE AND CALCIUM CHLORIDE: 600; 310; 30; 20 INJECTION, SOLUTION INTRAVENOUS at 07:26

## 2024-03-01 RX ADMIN — LEVOTHYROXINE SODIUM 200 MCG: 100 TABLET ORAL at 06:27

## 2024-03-01 RX ADMIN — ACETAMINOPHEN 650 MG: 325 TABLET ORAL at 13:39

## 2024-03-01 RX ADMIN — HEPARIN SODIUM 1800 UNITS: 1000 INJECTION INTRAVENOUS; SUBCUTANEOUS at 17:20

## 2024-03-01 RX ADMIN — PANTOPRAZOLE SODIUM 40 MG: 40 TABLET, DELAYED RELEASE ORAL at 20:05

## 2024-03-01 RX ADMIN — CEFAZOLIN 1 G: 330 INJECTION, POWDER, FOR SOLUTION INTRAMUSCULAR; INTRAVENOUS at 08:56

## 2024-03-01 RX ADMIN — PROPOFOL 50 MG: 10 INJECTION, EMULSION INTRAVENOUS at 08:50

## 2024-03-01 RX ADMIN — IPRATROPIUM BROMIDE AND ALBUTEROL SULFATE 3 ML: 2.5; .5 SOLUTION RESPIRATORY (INHALATION) at 13:17

## 2024-03-01 RX ADMIN — Medication: at 20:04

## 2024-03-01 RX ADMIN — HEPARIN SODIUM 1700 UNITS: 1000 INJECTION INTRAVENOUS; SUBCUTANEOUS at 17:20

## 2024-03-01 RX ADMIN — PROPOFOL 20 MG: 10 INJECTION, EMULSION INTRAVENOUS at 09:24

## 2024-03-01 RX ADMIN — FENTANYL CITRATE 25 MCG: 50 INJECTION, SOLUTION INTRAMUSCULAR; INTRAVENOUS at 08:50

## 2024-03-01 RX ADMIN — TRAMADOL HYDROCHLORIDE 50 MG: 50 TABLET, COATED ORAL at 22:43

## 2024-03-01 RX ADMIN — INSULIN LISPRO 3 UNITS: 100 INJECTION, SOLUTION INTRAVENOUS; SUBCUTANEOUS at 17:40

## 2024-03-01 RX ADMIN — LIDOCAINE HYDROCHLORIDE 5 ML: 20 INJECTION, SOLUTION INFILTRATION; PERINEURAL at 09:01

## 2024-03-01 RX ADMIN — Medication 2 L/MIN: at 13:17

## 2024-03-01 SDOH — HEALTH STABILITY: MENTAL HEALTH: CURRENT SMOKER: 1

## 2024-03-01 ASSESSMENT — COGNITIVE AND FUNCTIONAL STATUS - GENERAL
WALKING IN HOSPITAL ROOM: TOTAL
HELP NEEDED FOR BATHING: A LOT
TOILETING: TOTAL
MOVING TO AND FROM BED TO CHAIR: TOTAL
DRESSING REGULAR LOWER BODY CLOTHING: TOTAL
EATING MEALS: A LITTLE
DRESSING REGULAR UPPER BODY CLOTHING: A LOT
MOVING FROM LYING ON BACK TO SITTING ON SIDE OF FLAT BED WITH BEDRAILS: A LOT
CLIMB 3 TO 5 STEPS WITH RAILING: TOTAL
PERSONAL GROOMING: A LOT
DRESSING REGULAR LOWER BODY CLOTHING: TOTAL
TOILETING: TOTAL
DAILY ACTIVITIY SCORE: 11
MOBILITY SCORE: 8
CLIMB 3 TO 5 STEPS WITH RAILING: TOTAL
TURNING FROM BACK TO SIDE WHILE IN FLAT BAD: A LOT
STANDING UP FROM CHAIR USING ARMS: TOTAL
DAILY ACTIVITIY SCORE: 11
PERSONAL GROOMING: A LOT
DRESSING REGULAR UPPER BODY CLOTHING: A LOT
EATING MEALS: A LITTLE
HELP NEEDED FOR BATHING: A LOT
MOBILITY SCORE: 8
STANDING UP FROM CHAIR USING ARMS: TOTAL
MOVING TO AND FROM BED TO CHAIR: TOTAL
TURNING FROM BACK TO SIDE WHILE IN FLAT BAD: A LOT
MOVING FROM LYING ON BACK TO SITTING ON SIDE OF FLAT BED WITH BEDRAILS: A LOT
WALKING IN HOSPITAL ROOM: TOTAL

## 2024-03-01 ASSESSMENT — PAIN SCALES - GENERAL
PAINLEVEL_OUTOF10: 0 - NO PAIN
PAINLEVEL_OUTOF10: 0 - NO PAIN
PAINLEVEL_OUTOF10: 8
PAINLEVEL_OUTOF10: 0 - NO PAIN
PAINLEVEL_OUTOF10: 6
PAINLEVEL_OUTOF10: 0 - NO PAIN

## 2024-03-01 ASSESSMENT — PAIN - FUNCTIONAL ASSESSMENT
PAIN_FUNCTIONAL_ASSESSMENT: 0-10

## 2024-03-01 ASSESSMENT — PAIN DESCRIPTION - LOCATION: LOCATION: NECK

## 2024-03-01 NOTE — PROGRESS NOTES
Report from Sending RN:    Report From: Coral Guillory  Recent Surgery of Procedure: Yes, Catheter placement today  Baseline Level of Consciousness (LOC): A&O x3 Drowsy  Oxygen Use: Yes  Type: 4L NC  Diabetic: Yes  Last BP Med Given Day of Dialysis: See EMR  Last Pain Med Given: See EMR  Lab Tests to be Obtained with Dialysis: No  Blood Transfusion to be Given During Dialysis: No  Available IV Access: Yes  Medications to be Administered During Dialysis: No  Continuous IV Infusion Running: No  Restraints on Currently or in the Last 24 Hours: No  Hand-Off Communication: Pt didn't eat her lunch, BP high.

## 2024-03-01 NOTE — ANESTHESIA POSTPROCEDURE EVALUATION
Patient: Daphne Morris    Procedure Summary       Date: 03/01/24 Room / Location: [facility] OR 06 / Virtual [facility] OR    Anesthesia Start: 0842 Anesthesia Stop: 0954    Procedure: Insertion Central Venous Catheter Diagnosis:       Stage 3b chronic kidney disease (CKD) (CMS/HCC)      (Stage 3b chronic kidney disease (CKD) (CMS/HCC) [N18.32])    Surgeons: Sandy Kim MD Responsible Provider: MADI Freitas    Anesthesia Type: general ASA Status: 3            Anesthesia Type: general    Vitals Value Taken Time   /74 03/01/24 1015   Temp 36.4 °C (97.5 °F) 03/01/24 0945   Pulse 61 03/01/24 1015   Resp 20 03/01/24 1015   SpO2 92 % 03/01/24 1015       Anesthesia Post Evaluation    Patient location during evaluation: PACU  Patient participation: complete - patient participated  Level of consciousness: awake and alert  Pain management: adequate  Airway patency: patent  Cardiovascular status: acceptable  Respiratory status: acceptable  Hydration status: acceptable  Postoperative Nausea and Vomiting: none        No notable events documented.

## 2024-03-01 NOTE — PROGRESS NOTES
"Daphne Morris is a 68 y.o. female on day 11 of admission presenting with Heart failure (CMS/HCC).    Subjective   Patient was not seen in her room as she was in the theatre for her TDC. Review of her chart showed that vitals were stable overnight. Labs shows worsening creatinine and patient has remained oliguric     Objective   /74   Pulse 61   Temp 36.4 °C (97.5 °F)   Resp 20   Ht 1.676 m (5' 6\")   Wt 97.8 kg (215 lb 9.8 oz)   SpO2 92%   BMI 34.80 kg/m²   Wt Readings from Last 3 Encounters:   03/01/24 97.8 kg (215 lb 9.8 oz)   02/18/24 102 kg (224 lb 1.6 oz)   02/15/24 95.8 kg (211 lb 3.2 oz)       Physical Exam      Intake/Output Summary (Last 24 hours) at 3/1/2024 1025  Last data filed at 3/1/2024 0953  Gross per 24 hour   Intake 2166.25 ml   Output 1000 ml   Net 1166.25 ml         General appearance: no distress awake and alert on room air, euvolemic on exam  Eyes: non-icteric  HEENT: atrumatic head, PEERLA, moist mucosa  Skin: no apparent rash  Heart: NSR, S1, S2 normal, no murmur or gallop  Lungs: Symmetrical expansion,CTA bilat no wheezing/crackles  Abdomen: soft, nt/nd, obese  Extremities: no edema bilat  Neuro: No FND,asterixis, no focal deficits noticed        Review of Systems     Constitutional: no fever, no chills, no recent weight gain and no recent weight loss.   Eyes: no blurred vision and no diplopia.   ENT: no hearing loss, no earache, no sore throat, no swollen glands in the neck and no nasal discharge.   Cardiovascular: no chest pain, no palpitations and no lower extremity edema.   Respiratory: no shortness of breath, no chronic cough and no shortness of breath during exertion.   Gastrointestinal: no abdominal pain, no constipation, no heartburn, no vomiting, no bloody stools and no change in bowel movements.   Genitourinary: no dysuria and no hematuria.   Musculoskeletal: no arthralgias and no myalgias.   Skin: no rashes and no skin lesions.   Neurological: no headaches and no " dizziness.   Psychiatric: no confusion, no depression and no anxiety.   Endocrine: no heat intolerance, no cold intolerance, appetite not increased, no thyroid disorder, no increased urinary frequency and no dry skin.   Hematologic/Lymphatic: does not bleed easily and does not bruise easily.   All other systems have been reviewed and are negative for complaint.         Data Review        Results from last 7 days   Lab Units 03/01/24  0601 02/29/24  0538 02/28/24  0558   WBC AUTO x10*3/uL 9.9 11.6* 10.2   HEMOGLOBIN g/dL 7.7* 7.9* 7.9*   HEMATOCRIT % 25.3* 25.8* 25.8*   PLATELETS AUTO x10*3/uL 330 338 360        Lab Results   Component Value Date    HGBA1C 7.9 (H) 02/16/2024     Results from last 7 days   Lab Units 03/01/24  0601 02/29/24  0538 02/28/24  0558 02/27/24  0542 02/25/24  0610 02/24/24  0507   SODIUM mmol/L 137 135* 136 135* 137 133*   POTASSIUM mmol/L 4.7 4.6 5.1 4.8 5.2 4.9   CHLORIDE mmol/L 99 97* 98 97* 94* 94*   CO2 mmol/L 30 29 26 27 30 29   BUN mg/dL 105* 106* 106* 106* 102* 88*   GLUCOSE mg/dL 308* 288* 287* 266* 196* 251*   CALCIUM mg/dL 7.7* 7.6* 7.8* 7.8* 7.8* 7.6*   ANION GAP mmol/L 13 14 17 16 18 15   CREATININE mg/dL 3.67* 3.82* 3.88* 3.89* 4.08* 4.09*   EGFR mL/min/1.73m*2 13* 12* 12* 12* 11* 11*           Albumin/Creatine Ratio   Date Value Ref Range Status   02/01/2024   Final     Comment:     One or more analytes used in this calculation is outside of the analytical measurement range. Calculation cannot be performed.            RFP  Recent Labs     03/01/24  0601 02/29/24  0538 02/28/24  0558 02/27/24  0542 02/25/24  0610 02/24/24  0507 02/23/24  0533 02/22/24  0454    135* 136 135* 137 133* 133* 132*   K 4.7 4.6 5.1 4.8 5.2 4.9 4.9 5.1   CL 99 97* 98 97* 94* 94* 94* 94*   CO2 30 29 26 27 30 29 27 29   * 106* 106* 106* 102* 88* 82* 78*   CREATININE 3.67* 3.82* 3.88* 3.89* 4.08* 4.09* 3.93* 3.75*   GLUCOSE 308* 288* 287* 266* 196* 251* 217* 267*   CALCIUM 7.7* 7.6* 7.8* 7.8*  7.8* 7.6* 7.8* 7.5*   PHOS 5.3*  --  6.0* 6.0* 5.5* 5.5* 5.5* 6.0*   EGFR 13* 12* 12* 12* 11* 11* 12* 13*   ANIONGAP 13 14 17 16 18 15 17 14        Urineanalysis  Recent Labs     02/19/24  1543 02/09/24  1248 02/01/24  0515 07/19/23  1437 04/26/23 1740 05/03/21 2035 04/23/21 0310 04/06/21  1200 04/10/18  1415   COLORU Yellow Yellow Yellow STRAW YELLOW YELLOW YELLOW PALE YELLOW YELLOW   APPEARANCEU Hazy* Clear Clear CLEAR CLEAR CLEAR HAZY HAZY CLOUDY   SPECGRAVU 1.016 1.011 1.013 1.008 1.009 1.012 1.020 1.005 1.017   SYLVIE 5.0 5.0 5.0 6.0 6.0 6.5 6.0 6.5 5.5   PROTUR >=500 (3+)* >=500 (3+)* >=500 (3+)* >=500 (3+)* 100(2+)* 300* 600* 100* 30*   GLUCOSEU 50 (1+)* 150 (2+)* >=500 (3+)* 150 (2+)* 50(1+)* 100* 150* 50* 300*   BLOODU NEGATIVE MODERATE (2+)* NEGATIVE LARGE (3+)* SMALL(1+)* TRACE* TRACE* MODERATE* TRACE*   KETONESU 5 (TRACE)* NEGATIVE NEGATIVE NEGATIVE NEGATIVE NEGATIVE NEGATIVE NEGATIVE NEGATIVE   BILIRUBINU NEGATIVE NEGATIVE NEGATIVE NEGATIVE NEGATIVE NEGATIVE NEGATIVE NEGATIVE NEGATIVE   NITRITEU NEGATIVE NEGATIVE NEGATIVE NEGATIVE NEGATIVE NEGATIVE NEGATIVE POSITIVE* NEGATIVE   LEUKOCYTESU NEGATIVE TRACE* NEGATIVE NEGATIVE NEGATIVE NEGATIVE SMALL* LARGE* LARGE*       Urine Electrolytes  Recent Labs     02/19/24  1543 02/09/24  1248 02/01/24 0515 07/19/23 1437 04/30/23  1316 04/26/23 1740 05/03/21 2035 04/23/21 0310 04/06/21  1200 04/10/18  1415   SODIUMURR  --   --  40  --  27  --   --   --   --   --    NACREATUR  --   --  75  --  27  --   --   --   --   --    KUR  --   --  64  --   --   --   --   --   --   --    KCREATUR  --   --  119  --   --   --   --   --   --   --    CREATU  --   --  53.6  53.6  --  101.0  --   --   --   --   --    PROTUR >=500 (3+)* >=500 (3+)* >=500 (3+)* >=500 (3+)*  --  100(2+)* 300* 600* 100* 30*   ALBUMINUR  --   --  >2,250.0  --   --   --   --   --   --   --         Urine Micro  Recent Labs     02/19/24  1543 02/09/24  1248 02/01/24  0515 07/19/23  1437 04/26/23  1744  05/03/21  2035 04/23/21  0310 04/06/21  1200 04/10/18  1415   WBCU 1-5 6-10* 1-5 5* 1 NONE SEEN 53* LOADED 1,077*   RBCU 1-2 11-20* 3-5 >182* 1 2-4 5* 16-25 5*   HYALCASTU  --  2+* OCCASIONAL*  --   --   --  70  --  3   SQUAMEPIU 1-9 (SPARSE) 1-9 (SPARSE)  --  1 1  --  MODERATE  --  FEW   BACTERIAU 1+* 1+* 1+* 1+*  --   --  POSITIVE PRESENT  Performed at [facility] [address] [facility] [address]   POSITIVE   MUCUSU  --  FEW FEW FEW  --   --   --   --   --         Iron  Recent Labs     02/20/24  0604 05/02/23  0607 04/28/23  0615 04/27/23  0624 02/23/23  1113   IRON 24*  --   --  26* 54   TIBC 214*  --   --  289 281   IRONSAT 11*  --   --  9* 19.2   FERRITIN 234* 71 80  --  42        [MAR Hold] amiodarone, 200 mg, oral, Daily with breakfast  [MAR Hold] amLODIPine, 5 mg, oral, Daily  [MAR Hold] ammonium lactate, , Topical, BID  [MAR Hold] aspirin, 81 mg, oral, Daily  [MAR Hold] atorvastatin, 40 mg, oral, Daily  [Held by provider] clopidogrel, 75 mg, oral, Daily  [MAR Hold] ferrous gluconate, 324 mg, oral, Daily with breakfast  [Held by provider] heparin (porcine), 5,000 Units, subcutaneous, q8h  [Held by provider] insulin glargine, 14 Units, subcutaneous, Nightly  [MAR Hold] insulin lispro, 0-15 Units, subcutaneous, TID with meals  [MAR Hold] ipratropium-albuteroL, 3 mL, nebulization, TID  [MAR Hold] levothyroxine, 200 mcg, oral, Daily before breakfast  [MAR Hold] lidocaine, 5 mL, infiltration, Once  [MAR Hold] melatonin, 5 mg, oral, Daily  [MAR Hold] metoprolol succinate XL, 50 mg, oral, BID  [MAR Hold] pantoprazole, 40 mg, oral, BID  [MAR Hold] polyethylene glycol, 17 g, oral, Daily  [MAR Hold] predniSONE, 40 mg, oral, Daily  [MAR Hold] venlafaxine XR, 75 mg, oral, Daily      Assessment and Plan   Daphne Morris  is a 68 y.o. female who has past medical history of  heart failure, atrial fibrillation s/p Watchman, right nephrectomy, hypertension, type 2 DM admitted for acute hypoxic respiratory failure  2/2 to pneumonia and acute decompensated heart failure.        # ANDREA on CKD   -Likely  Cardiorenal syndrome 2/2 Acute decompensated CHF  -(CKD) 4/A3-baseline SCr 1.8-2.5 most likely atherosclerotic cardiovascular disease/diabetic nephropathy, prior nephrectomy.  She follows with Dr. Mitchell  - Peaked Scr during this admission 3-4.09, GFR 11   -Kidney ultrasound with no obstruction on the left side.  Right total nephrectomy noticed     Plan  -Patient will need intermittent hemodialysis, on account of persistently worsening renal function, refractory to diuretic therapy.  -Long discussion with patient on need to dialyze, she was also informed that although dialysis may increase life expectancy, may not necessarily improve quality of life.  -Discussed with patient's primary nephrologist Dr. Harris, who is agreeable to plan for IHD.  And in the event status patient consents to dialysis, subsequent dialysis sessions would be at [facility]  - Patient is agreeable to dialysis   -Recommend permanent discontinuation of potassium supplements   -Will put in dialysis orders for today, repeat session tomorrow, slow start protocol        Jazmine Segal MD

## 2024-03-01 NOTE — CARE PLAN
The patient's goals for the shift include      The clinical goals for the shift include patient with remain HDS    Over the shift, the patient did not make progress toward the following goals. Barriers to progression include patient is NPO for catheter placement. Recommendations to address these barriers include continue with plan of care.

## 2024-03-01 NOTE — PROGRESS NOTES
Report to Receiving RN:    Report To: Coral Guillory  Time Report Called: [id number]  Hand-Off Communication: First HD TX finished, all blood was returned, Tx ended 9 minutes early d/t clotting of the dialysis machine, Nephrology is aware; Dressing D&I very minimal blood visualized at the insertion site; Approx 400ml removed with HD; Ending BP is 142/71 p65.  Complications During Treatment: Yes, off 9 minutes early d/t clotting machine setup.  Ultrafiltration Treatment: No  Medications Administered During Dialysis: No  Blood Products Administered During Dialysis: No  Labs Sent During Dialysis: No  Heparin Drip Rate Changes: N/A    Electronic Signatures:  Janine Lyons OCDT   Last Updated: 5:13 PM by JANINE LYONS

## 2024-03-01 NOTE — PROGRESS NOTES
"Daphne Morris is a 68 y.o. female on day 11 of admission presenting with Heart failure (CMS/HCC).      Subjective   She is now s/p dialysis catheter, still sleepy from anesthesia. No complaints.       Review of systems:  Constitutional: negative for fever, chills, or malaise  Neuro: negative for dizziness, headache, numbness, tingling  ENT: Negative for nasal congestion or sore throat  CV: negative for chest pain, palpitations  GI: negative for abd pain, nausea, vomiting or diarrhea  : negative for dysuria, frequency, or urgency  Skin: negative for lesions, wounds, or rash  Musculoskeletal: Negative for myalgia, or arthralgia  Endocrine: Negative for polyuria or polydipsia         Objective   Constitutional: Well developed, alert and oriented x3, no distress, cooperative  Eyes: PERRL, EOMI, clear sclera  ENMT: mucous membranes moist, no apparent injury, no lesions seen  Head/Neck: Neck supple, no apparent injury, thyroid without mass or tenderness, No JVD, trachea midline, no bruits  Respiratory/Thorax: Patent airways, diminished throughout with good chest expansion, thorax symmetric  Cardiovascular: Regular, rate and rhythm, no murmurs, 2+ equal pulses of the extremities, normal S 1and S 2  Gastrointestinal: Nondistended, soft, non-tender, no rebound tenderness or guarding, no masses palpable, no organomegaly, +BS, no bruits  Musculoskeletal: ROM intact, no joint swelling, normal strength  Extremities: generalized edema  Neurological: Alert and oriented x3  Lymphatic: No significant lymphadenopathy  Skin: Warm and dry, dialysis catheter to right upper chest      Last Recorded Vitals  /73 (BP Location: Right arm, Patient Position: Lying)   Pulse 60   Temp 36.8 °C (98.2 °F)   Resp 18   Ht 1.676 m (5' 6\")   Wt 97.8 kg (215 lb 9.8 oz)   SpO2 96%   BMI 34.80 kg/m²     Intake/Output last 3 Shifts:  I/O last 3 completed shifts:  In: 2220 (22.4 mL/kg) [P.O.:2220]  Out: 1400 (14.1 mL/kg) [Urine:1400 (0.4 " mL/kg/hr)]  Dosing Weight: 99.1 kg   I/O this shift:  In: 306.3 [I.V.:306.3]  Out: -     Relevant Results  Scheduled medications  amiodarone, 200 mg, oral, Daily with breakfast  ammonium lactate, , Topical, BID  aspirin, 81 mg, oral, Daily  atorvastatin, 40 mg, oral, Daily  [Held by provider] clopidogrel, 75 mg, oral, Daily  ferrous gluconate, 324 mg, oral, Daily with breakfast  [Held by provider] heparin (porcine), 5,000 Units, subcutaneous, q8h  [Held by provider] insulin glargine, 14 Units, subcutaneous, Nightly  insulin lispro, 0-15 Units, subcutaneous, TID with meals  ipratropium-albuteroL, 3 mL, nebulization, TID  levothyroxine, 200 mcg, oral, Daily before breakfast  lidocaine, 5 mL, infiltration, Once  melatonin, 5 mg, oral, Daily  pantoprazole, 40 mg, oral, BID  polyethylene glycol, 17 g, oral, Daily  predniSONE, 40 mg, oral, Daily  venlafaxine XR, 75 mg, oral, Daily      Continuous medications  [Held by provider] furosemide, 10 mg/hr, Last Rate: 10 mg/hr (02/22/24 1927)      PRN medications  PRN medications: acetaminophen **OR** acetaminophen **OR** acetaminophen, acetaminophen **OR** acetaminophen **OR** acetaminophen, albuterol, dextrose 10 % in water (D10W), dextrose, diphenhydrAMINE, glucagon, ipratropium-albuteroL, ondansetron **OR** ondansetron, oxygen, oxygen    Results for orders placed or performed during the hospital encounter of 02/19/24 (from the past 24 hour(s))   POCT GLUCOSE   Result Value Ref Range    POCT Glucose 216 (H) 74 - 99 mg/dL   POCT GLUCOSE   Result Value Ref Range    POCT Glucose 296 (H) 74 - 99 mg/dL   POCT GLUCOSE   Result Value Ref Range    POCT Glucose 288 (H) 74 - 99 mg/dL   CBC and Auto Differential   Result Value Ref Range    WBC 9.9 4.4 - 11.3 x10*3/uL    nRBC 0.0 0.0 - 0.0 /100 WBCs    RBC 2.60 (L) 4.00 - 5.20 x10*6/uL    Hemoglobin 7.7 (L) 12.0 - 16.0 g/dL    Hematocrit 25.3 (L) 36.0 - 46.0 %    MCV 97 80 - 100 fL    MCH 29.6 26.0 - 34.0 pg    MCHC 30.4 (L) 32.0 - 36.0  g/dL    RDW 13.9 11.5 - 14.5 %    Platelets 330 150 - 450 x10*3/uL    Neutrophils % 87.9 40.0 - 80.0 %    Immature Granulocytes %, Automated 1.1 (H) 0.0 - 0.9 %    Lymphocytes % 3.3 13.0 - 44.0 %    Monocytes % 7.6 2.0 - 10.0 %    Eosinophils % 0.0 0.0 - 6.0 %    Basophils % 0.1 0.0 - 2.0 %    Neutrophils Absolute 8.69 (H) 1.20 - 7.70 x10*3/uL    Immature Granulocytes Absolute, Automated 0.11 0.00 - 0.70 x10*3/uL    Lymphocytes Absolute 0.33 (L) 1.20 - 4.80 x10*3/uL    Monocytes Absolute 0.75 0.10 - 1.00 x10*3/uL    Eosinophils Absolute 0.00 0.00 - 0.70 x10*3/uL    Basophils Absolute 0.01 0.00 - 0.10 x10*3/uL   Renal Function Panel   Result Value Ref Range    Glucose 308 (H) 74 - 99 mg/dL    Sodium 137 136 - 145 mmol/L    Potassium 4.7 3.5 - 5.3 mmol/L    Chloride 99 98 - 107 mmol/L    Bicarbonate 30 21 - 32 mmol/L    Anion Gap 13 10 - 20 mmol/L    Urea Nitrogen 105 (HH) 6 - 23 mg/dL    Creatinine 3.67 (H) 0.50 - 1.05 mg/dL    eGFR 13 (L) >60 mL/min/1.73m*2    Calcium 7.7 (L) 8.6 - 10.3 mg/dL    Phosphorus 5.3 (H) 2.5 - 4.9 mg/dL    Albumin 2.6 (L) 3.4 - 5.0 g/dL   Magnesium   Result Value Ref Range    Magnesium 2.24 1.60 - 2.40 mg/dL   POCT GLUCOSE   Result Value Ref Range    POCT Glucose 299 (H) 74 - 99 mg/dL   POCT GLUCOSE   Result Value Ref Range    POCT Glucose 275 (H) 74 - 99 mg/dL       XR chest 1 view   Final Result   1. Interval placement of right IJ dialysis catheter with its tip   projecting over the expected location of proximal SVC. No large   pneumothorax.   2. Redemonstration of cardiomegaly with right thoracic pleural   effusion, slightly improved compared to prior radiograph. Possible   small left pleural effusion.   3. Redemonstration of interstitial edema.        Signed by: Andreea Harmon 3/1/2024 10:29 AM   Dictation workstation:   [id number]      FL less than 1 hour   Final Result      XR chest 1 view   Final Result   1. Large right pleural effusion similar to recent prior studies.   2.  Pulmonary venous hypertension.   3. Cardiomegaly.   4. Probable left lower lobe infiltrate or atelectasis.                  MACRO:   None        Signed by: Mario Sampson 2/21/2024 11:57 AM   Dictation workstation:   [id number]      NM Lung perfusion with spect/ct   Final Result   There are no segmental or subsegmental perfusion defects in both   lungs indicating low probability for acute pulmonary embolism.   Moderate-sized right pleural effusion.             The interpretation above is based on modified PISAPED criteria.        This study was analyzed and interpreted at [place], [place].        Signed by: Rolando Dillon 2/19/2024 10:38 AM   Dictation workstation:   [id number]      Bedside Midline Imaging    (Results Pending)   Consult to Interventional Radiology    (Results Pending)       Transthoracic Echo (TTE) Complete    Result Date: 2/5/2024   [facility], [address] [place] [address]               Tel [phone] and Fax [phone] TRANSTHORACIC ECHOCARDIOGRAM REPORT  Patient Name:      MOE MAYA      Reading Physician:    [id number] Nicholas Antonio MD Study Date:        2/5/2024             Ordering Provider:    [id number] DOMONIQUE PURCELL MRN/PID:           [id number]             Fellow: Accession#:        [id number]         Nurse: Date of Birth/Age: 1955 / 68 years Sonographer:          Nina Angel RDCS Gender:            F                    Additional Staff: Height:            167.64 cm            Admit Date:           2/2/2024 Weight:            102.51 kg            Admission Status:     Inpatient -                                                               Routine BSA:               2.11 m2              Encounter#:           [id number]                                          Department Location:  [facility] Non                                                               Invasive Blood Pressure: 147 /65 mmHg Study Type:    TRANSTHORACIC ECHO (TTE) COMPLETE Diagnosis/ICD: Acute combined systolic (congestive) and diastolic (congestive)                heart failure (CHF)-I50.41 Indication:    Congestive Heart Failure CPT Code:      Echo Complete w Full Doppler-87065 Patient History: Diabetes:         Yes Pertinent         A-Fib, HTN, Dyspnea and LE Edema. Watchman device, elevated History:          BNP,. Study Detail: The following Echo studies were performed: 2D, M-Mode, Doppler and               color flow.  PHYSICIAN INTERPRETATION: Left Ventricle: The left ventricular systolic function is normal, with an estimated ejection fraction of 60-65%. There are no regional wall motion abnormalities. The left ventricular cavity size is normal. Spectral Doppler shows an impaired relaxation pattern of left ventricular diastolic filling. Left Atrium: The left atrium is moderately dilated. Right Ventricle: The right ventricle is mildly enlarged. There is mildly reduced right ventricular systolic function. Right Atrium: The right atrium is moderately dilated. Aortic Valve: The aortic valve is trileaflet. There is mild aortic valve cusp calcification. There is trivial aortic valve regurgitation. The peak instantaneous gradient of the aortic valve is 14.1 mmHg. The mean gradient of the aortic valve is 9.1 mmHg. Mitral Valve: The mitral valve is normal in structure. There is mild mitral annular calcification. There is mild to moderate mitral valve regurgitation. Tricuspid Valve: The tricuspid valve is structurally normal. There is moderate tricuspid regurgitation. Pulmonic Valve: The pulmonic valve is structurally normal. There is mild pulmonic valve regurgitation. Pericardium: There is a small to moderate pericardial effusion posterior to the left ventricle. There is no  evidence of cardiac tamponade. Aorta: The aortic root is normal. Pulmonary Artery: The tricuspid regurgitant velocity is 3.04 m/s, and with an estimated right atrial pressure of 3 mmHg, the estimated pulmonary artery pressure is mildly elevated with the RVSP at 39.9 mmHg. Pulmonary Veins: The pulmonary veins appear normal and return normally to the left atrium. Systemic Veins: The inferior vena cava appears to be of normal size. There is IVC inspiratory collapse greater than 50%.  CONCLUSIONS:  1. Left ventricular systolic function is normal with a 60-65% estimated ejection fraction.  2. Spectral Doppler shows an impaired relaxation pattern of left ventricular diastolic filling.  3. There is mildly reduced right ventricular systolic function.  4. The left atrium is moderately dilated.  5. The right atrium is moderately dilated.  6. There is a small to moderate pericardial effusion.  7. There is no evidence of cardiac tamponade.  8. Mild to moderate mitral valve regurgitation.  9. Moderate tricuspid regurgitation visualized. 10. Normal CVP. Estimated PASP around 45 mm Hg. QUANTITATIVE DATA SUMMARY: 2D MEASUREMENTS:                           Normal Ranges: IVSd:          1.52 cm    (0.6-1.1cm) LVPWd:         1.42 cm    (0.6-1.1cm) LVIDd:         4.35 cm    (3.9-5.9cm) LVIDs:         2.94 cm LV Mass Index: 120.7 g/m2 LV % FS        32.3 % LA VOLUME:                              Normal Ranges: LA Vol A4C:       79.5 ml    (22+/-6mL/m2) LA Vol A2C:       78.2 ml LA Vol BP:        79.7 ml LA Vol Index A4C: 37.7ml/m2 LA Vol Index A2C: 37.1 ml/m2 LA Vol Index BP:  37.9 ml/m2 LA Volume Index:  37.8 ml/m2 LA Vol A4C:       76.0 ml LA Vol A2C:       73.9 ml RA VOLUME BY A/L METHOD:                       Normal Ranges: RA Area A4C: 17.8 cm2 M-MODE MEASUREMENTS:                  Normal Ranges: Ao Root: 3.00 cm (2.0-3.7cm) LAs:     5.08 cm (2.7-4.0cm) LV SYSTOLIC FUNCTION BY 2D PLANIMETRY (MOD):                     Normal Ranges:  EF-A4C View: 63.4 % (>=55%) EF-A2C View: 63.8 % EF-Biplane:  62.5 % LV DIASTOLIC FUNCTION:                             Normal Ranges: MV Peak E:      1.33 m/s    (0.7-1.2 m/s) MV Peak A:      0.53 m/s    (0.42-0.7 m/s) E/A Ratio:      2.52        (1.0-2.2) MV e'           0.06 m/s    (>8.0) MV lateral e'   0.06 m/s MV medial e'    0.06 m/s MV A Dur:       110.73 msec E/e' Ratio:     22.09       (<8.0) PulmV Sys Mike:  53.97 cm/s PulmV Dugan Mike: 77.63 cm/s PulmV S/D Mike:  0.70 MITRAL VALVE:                 Normal Ranges: MV DT: 245 msec (150-240msec) MITRAL INSUFFICIENCY:                         Normal Ranges: MR VTI:     171.38 cm MR Vmax:    489.73 cm/s MR Volume:  20.07 ml MR Flow Rt: 57.36 ml/s MR EROA:    0.12 cm2 AORTIC VALVE:                                    Normal Ranges: AoV Vmax:                1.88 m/s  (<=1.7m/s) AoV Peak P.1 mmHg (<20mmHg) AoV Mean P.1 mmHg  (1.7-11.5mmHg) LVOT Max Mike:            0.97 m/s  (<=1.1m/s) AoV VTI:                 46.22 cm  (18-25cm) LVOT VTI:                24.89 cm LVOT Diameter:           1.96 cm   (1.8-2.4cm) AoV Area, VTI:           1.62 cm2  (2.5-5.5cm2) AoV Area,Vmax:           1.56 cm2  (2.5-4.5cm2) AoV Dimensionless Index: 0.54  RIGHT VENTRICLE: RV Basal 3.80 cm RV Mid   2.80 cm RV Major 8.0 cm TAPSE:   18.0 mm RV s'    0.13 m/s TRICUSPID VALVE/RVSP:                             Normal Ranges: Peak TR Velocity: 3.04 m/s RV Syst Pressure: 39.9 mmHg (< 30mmHg) PULMONIC VALVE:                      Normal Ranges: PV Max Mike: 1.0 m/s  (0.6-0.9m/s) PV Max PG:  3.9 mmHg Pulmonary Veins: PulmV Dugan Mike: 77.63 cm/s PulmV S/D Mike:  0.70 PulmV Sys Mike:  53.97 cm/s  [id number] Nicholas Antonio MD Electronically signed on 2024 at 5:06:05 PM  ** Final **           Assessment/Plan   Principal Problem:    Heart failure (CMS/HCC)  Active Problems:    Stage 3b chronic kidney disease (CKD) (CMS/HCC)    1. Acute on chronic hypoxic/hypercapnic  respiratory failure 2/2 acute on chronic diastolic CHF, influenza, COPD   -Isolation  -Tamiflu  -BNP 1444  -CXR showed CHF, Grossly stable pleural and parenchymal opacities in the mid and lower right hemithorax. Mild new left pleural effusion  -VQ scan showed moderate right pleural effusion  -bronchodilators  -Steroids  -oxygen/bipap support  -Significant leg to abd swelling and shortness of breath  -Strict I & Os  -Daily weights  -2gm na diet  -Echo as above  -Unable to start SGLT2 inhibitors with current GFR  -s/p RHC during last admit showed MEAN PA 30 MILD PULMONARY HTN MEAN RA 9 MM HG MEAN PCWP 23 MM HG CARDIAC OUTPUT 8.03 CI 3.79 NO SHUNTS   -now s/p HD catheter->planning for dialysis  -Monitor on tele     2. Elevated troponins-non MI elevation 2/2 influenza, respiratory distress  -Denies chest pain  -Does have shortness of breath-worsening for weeks  -Unable to have LHC due to creatinine  -Cont asa, plavix, statin, metoprolol  -Monitor on tele     3. Paroxysmal atrial fibrillation with RVR  -s/p Watchman in Aug 2023  -Currently SB/SR  -Cont amiodarone and metoprolol for rate control  -Monitor on tele     4. Hypertension  -stable  -2gm na diet  -cont meds  -monitor     5. Acute on chronic Anemia  -hgb down to 6.4, s/p PRBCs  -Hgb 7.6->7.9->7.7 today  -Monitor CBC  -Cont asa and plavix for now->hold if hgb conts to drop     6. Acute on Chronic kidney disease  -s/p nephrectomy  -Renal US negative  -Creatinine worsening and lower UOP with diuresis and still volume overloaded  -Renal consulted, note reviewed  -now s/p HD catheter->planning for dialysis  -palliative care following     7. Diabetes  -ISS  -Accuchecks     8. Hypothyroidism  -Cont synthroid      CARLENE Villegas-CNP

## 2024-03-01 NOTE — ANESTHESIA PREPROCEDURE EVALUATION
Patient: Daphne Morris    Procedure Information       Date/Time: 03/01/24 0880    Procedure: Insertion Central Venous Catheter - tunnelled HD catheter. will need bedside US and C arm    Location: [facility] 06 / [facility]    Surgeons: Sandy Kim MD            Relevant Problems   Cardiovascular   (+) Acute on chronic combined systolic and diastolic heart failure (CMS/HCC)   (+) CHF (congestive heart failure), NYHA class I, acute on chronic, combined (CMS/HCC)   (+) Heart failure (CMS/HCC)   (+) Hyperlipidemia, unspecified   (+) Hypertension   (+) Hypertensive heart and renal disease with (congestive) heart failure (CMS/HCC)   (+) Mixed hyperlipidemia   (+) Paroxysmal atrial fibrillation (CMS/[facility])   (+) Peripheral vascular disease (CMS/[facility])      Endocrine   (+) Diabetic renal disease (CMS/[facility])   (+) Hypothyroidism   (+) Type 2 diabetes mellitus (CMS/[facility])      GI   (+) Gastroesophageal reflux disease      /Renal   (+) Diabetic renal disease (CMS/[facility])   (+) Hypertensive heart and renal disease with (congestive) heart failure (CMS/HCC)   (+) Stage 3b chronic kidney disease (CKD) (CMS/[facility])      Neuro/Psych   (+) Anxiety   (+) Depression   (+) Major depressive disorder, single episode, unspecified      Pulmonary   (+) Chronic obstructive pulmonary disease (CMS/[facility])      Hematology   (+) Absolute anemia   (+) Iron deficiency anemia due to chronic blood loss      Musculoskeletal   (+) Osteoarthritis   (+) Primary osteoarthritis of left knee       Clinical information reviewed:   Tobacco  Allergies  Meds   Med Hx  Surg Hx   Fam Hx          NPO Detail:  No data recorded       Discussed with patient regarding suspension of DNR for the duration of surgery and immediate post op care and will be reinstated post operatively. Patient agreeable to decision. Questions answered.       Physical Exam    Airway  Mallampati: III  TM distance: >3 FB  Neck ROM: full     Cardiovascular    Dental   (+) upper dentures, lower  dentures     Pulmonary   (+) decreased breath sounds     Abdominal      Other findings: Unable to lay flat due to SOB on 3L NC 95%           Anesthesia Plan    History of general anesthesia?: yes  History of complications of general anesthesia?: no    ASA 3     general   (Patient unable to lay flat )  The patient is a current smoker.  Patient was previously instructed to abstain from smoking on day of procedure.  Patient did not smoke on day of procedure.  Education provided regarding risk of obstructive sleep apnea.  intravenous induction   Anesthetic plan and risks discussed with patient.  Use of blood products discussed with patient who.    Plan discussed with CRNA.

## 2024-03-01 NOTE — PROGRESS NOTES
Daphne Morris is a 68 y.o. female on day 11 of admission presenting with Heart failure (CMS/HCC).      Subjective   Seen and examined, no new complaints.  No overnight events.  The plan discussed with the patient and RN.    Objective     Last Recorded Vitals  /73 (BP Location: Right arm, Patient Position: Lying)   Pulse 60   Temp 36.8 °C (98.2 °F)   Resp 18   Wt 97.8 kg (215 lb 9.8 oz)   SpO2 96%   Intake/Output last 3 Shifts:    Intake/Output Summary (Last 24 hours) at 3/1/2024 1419  Last data filed at 3/1/2024 0953  Gross per 24 hour   Intake 1766.25 ml   Output 800 ml   Net 966.25 ml       Admission Weight  Weight: 95.4 kg (210 lb 5.1 oz) (02/19/24 0438)    Daily Weight  03/01/24 : 97.8 kg (215 lb 9.8 oz)    Image Results  XR chest 1 view  Narrative: Interpreted By:  Andreea Harmon,   STUDY:  XR CHEST 1 VIEW;      INDICATION:  Signs/Symptoms:RIJ HD catheter placement.      COMPARISON:  Chest radiograph dated 02/21/2024      ACCESSION NUMBER(S):  [id number]      ORDERING CLINICIAN:  JOSHUA SPANGLER      FINDINGS:  Cardiac silhouette is diffusely enlarged. There is interval placement  of right IJ dialysis catheter with its tip projecting over the  expected region of proximal SVC. There is persistent evidence of  moderate to large right thoracic pleural effusion, slightly improved  compared to prior radiograph. There is also possible small left  pleural effusion. There are prominent interstitial markings in  bilateral lungs concerning for interstitial edema. No large  pneumothorax within limits of portable technique. No acute osseous  findings.      Impression: 1. Interval placement of right IJ dialysis catheter with its tip  projecting over the expected location of proximal SVC. No large  pneumothorax.  2. Redemonstration of cardiomegaly with right thoracic pleural  effusion, slightly improved compared to prior radiograph. Possible  small left pleural effusion.  3. Redemonstration of interstitial  edema.      Signed by: Andreea Harmon 3/1/2024 10:29 AM  Dictation workstation:   [id number]  FL less than 1 hour  These images are not reportable by radiology and will not be interpreted   by  Radiologists.      Physical Exam  Constitutional: patient is alert and cooperative with exam  skin: dry and warm  Eyes: EOMI and clear sclera  ENMT: moist mucous membranes  Head/Neck: neck supple   Respiratory/Thorax: Clear to auscultation bilaterally, no wheezing or crackles appreciated  Cardiovascular: regular rate and rhythm, S1 and S2 present, no murmurs heard  Gastrointestinal: bowel sounds present, no pain or tenderness upon palpation, soft and nondistended  Extremities: +2 radial, posterior tibial, and pedal pulse, no lower extremity edema noted  Neurological: AxOx3    Relevant Results  Scheduled medications  amiodarone, 200 mg, oral, Daily with breakfast  ammonium lactate, , Topical, BID  aspirin, 81 mg, oral, Daily  atorvastatin, 40 mg, oral, Daily  [Held by provider] clopidogrel, 75 mg, oral, Daily  ferrous gluconate, 324 mg, oral, Daily with breakfast  [Held by provider] heparin (porcine), 5,000 Units, subcutaneous, q8h  [Held by provider] insulin glargine, 14 Units, subcutaneous, Nightly  insulin lispro, 0-15 Units, subcutaneous, TID with meals  ipratropium-albuteroL, 3 mL, nebulization, TID  levothyroxine, 200 mcg, oral, Daily before breakfast  lidocaine, 5 mL, infiltration, Once  melatonin, 5 mg, oral, Daily  pantoprazole, 40 mg, oral, BID  polyethylene glycol, 17 g, oral, Daily  predniSONE, 40 mg, oral, Daily  venlafaxine XR, 75 mg, oral, Daily      Continuous medications  [Held by provider] furosemide, 10 mg/hr, Last Rate: 10 mg/hr (02/22/24 1927)      PRN medications  PRN medications: acetaminophen **OR** acetaminophen **OR** acetaminophen, acetaminophen **OR** acetaminophen **OR** acetaminophen, albuterol, dextrose 10 % in water (D10W), dextrose, diphenhydrAMINE, glucagon, ipratropium-albuteroL,  ondansetron **OR** ondansetron, oxygen, oxygen    Results for orders placed or performed during the hospital encounter of 02/19/24 (from the past 24 hour(s))   POCT GLUCOSE   Result Value Ref Range    POCT Glucose 216 (H) 74 - 99 mg/dL   POCT GLUCOSE   Result Value Ref Range    POCT Glucose 296 (H) 74 - 99 mg/dL   POCT GLUCOSE   Result Value Ref Range    POCT Glucose 288 (H) 74 - 99 mg/dL   CBC and Auto Differential   Result Value Ref Range    WBC 9.9 4.4 - 11.3 x10*3/uL    nRBC 0.0 0.0 - 0.0 /100 WBCs    RBC 2.60 (L) 4.00 - 5.20 x10*6/uL    Hemoglobin 7.7 (L) 12.0 - 16.0 g/dL    Hematocrit 25.3 (L) 36.0 - 46.0 %    MCV 97 80 - 100 fL    MCH 29.6 26.0 - 34.0 pg    MCHC 30.4 (L) 32.0 - 36.0 g/dL    RDW 13.9 11.5 - 14.5 %    Platelets 330 150 - 450 x10*3/uL    Neutrophils % 87.9 40.0 - 80.0 %    Immature Granulocytes %, Automated 1.1 (H) 0.0 - 0.9 %    Lymphocytes % 3.3 13.0 - 44.0 %    Monocytes % 7.6 2.0 - 10.0 %    Eosinophils % 0.0 0.0 - 6.0 %    Basophils % 0.1 0.0 - 2.0 %    Neutrophils Absolute 8.69 (H) 1.20 - 7.70 x10*3/uL    Immature Granulocytes Absolute, Automated 0.11 0.00 - 0.70 x10*3/uL    Lymphocytes Absolute 0.33 (L) 1.20 - 4.80 x10*3/uL    Monocytes Absolute 0.75 0.10 - 1.00 x10*3/uL    Eosinophils Absolute 0.00 0.00 - 0.70 x10*3/uL    Basophils Absolute 0.01 0.00 - 0.10 x10*3/uL   Renal Function Panel   Result Value Ref Range    Glucose 308 (H) 74 - 99 mg/dL    Sodium 137 136 - 145 mmol/L    Potassium 4.7 3.5 - 5.3 mmol/L    Chloride 99 98 - 107 mmol/L    Bicarbonate 30 21 - 32 mmol/L    Anion Gap 13 10 - 20 mmol/L    Urea Nitrogen 105 (HH) 6 - 23 mg/dL    Creatinine 3.67 (H) 0.50 - 1.05 mg/dL    eGFR 13 (L) >60 mL/min/1.73m*2    Calcium 7.7 (L) 8.6 - 10.3 mg/dL    Phosphorus 5.3 (H) 2.5 - 4.9 mg/dL    Albumin 2.6 (L) 3.4 - 5.0 g/dL   Magnesium   Result Value Ref Range    Magnesium 2.24 1.60 - 2.40 mg/dL   POCT GLUCOSE   Result Value Ref Range    POCT Glucose 299 (H) 74 - 99 mg/dL   POCT GLUCOSE    Result Value Ref Range    POCT Glucose 275 (H) 74 - 99 mg/dL       Assessment/Plan      Principal Problem:    Heart failure (CMS/[facility])  Active Problems:    Stage 3b chronic kidney disease (CKD) (CMS/[facility])    Daphne Morris is a 68 y.o. female presenting with past medical history of heart failure, atrial fibrillation, Hypertension, type 2 DM transfer from [facility] ER due to acute hypoxic respiratory failure secondary to pneumonia, COPD exacerbation and acute diastolic heart failure.     #Stage III/IV chronic kidney disease  Status post right nephrectomy  Nephrology on board, family are agreeable with hemodialysis, unable to get a dialysis catheter over the weekend,   -General surgery on board, hemodialysis line placement today, plan to start hemodialysis today, to hold blood pressure medications prior to dialysis      #Acute hypoxic respiratory failure secondary to acute on chronic diastolic heart failure and influenza B pneumonia  -Leukocytosis today we will continue to monitor  -Cardiology on board, unable to do left heart cath due to elevated creatinine, unable to start SGLT2 inhibitors as well  -Pulmonary on board plan to continue prednisone, breathing treatment, patient finished Tamiflu    #Anemia  -Status post 1 unit of blood   -FOBT positive  -A iron saturation of 11 iron level of 24 ferritin in 200  -On ferrous gluconate      #Paroxysmal atrial fibrillation  Status post watchman in August 2023  Currently in sinus rhythm  Continue amiodarone and metoprolol     #Type 2 diabetes mellitus  Continue insulin sliding scale  Holding Lantus due to limited oral intake  Holding Januvia  Continue diabetic diet  Follow Accu-Cheks     #Hypertension  Metoprolol      #GERD  -PPI     #yperlipidemia  Atorvastatin     #Depression  Venlafaxine     #Hypothyroidism  Continue Synthroid     #DVT prophylaxis  Heparin     #Disposition  -Patient met with hospice, the patient and her family are not ready to sign with hospice right  now.  -CODE STATUS changed to DNR/DNI  -Nephrology discussed with the family hemodialysis and they were agreeable  -Plan to consult General surgery to get dialysis catheter today and to start hemodialysis  likely today    Myke Newsome MD

## 2024-03-01 NOTE — OP NOTE
Insertion Central Venous Catheter Operative Note     Date: 2024 - 3/1/2024  OR Location: [facility] OR    Name: Daphne Morris, : 1955, Age: 68 y.o., MRN: [id number], Sex: female    Diagnosis  Pre-op Diagnosis     * Stage 3b chronic kidney disease (CKD) (CMS/HCC) [N18.32] Post-op Diagnosis     * Stage 3b chronic kidney disease (CKD) (CMS/HCC) [N18.32]     Procedures  Insertion Central Venous Catheter  33322 - NM INSJ TUNNELED CVC W/O SUBQ PORT/ AGE 5 YR/>      Surgeons      * Sandy Kim - Primary    Resident/Fellow/Other Assistant:  Surgeon(s) and Role:    Procedure Summary  Anesthesia: Monitor Anesthesia Care  ASA: III  Anesthesia Staff: CRNA: CARLENE Freitas-CRNA  Estimated Blood Loss: 5mL  Intra-op Medications:   Administrations occurring from 0850 to 1020 on 24:   Medication Name Total Dose   heparin (porcine) 5,000 Units in sodium chloride 0.9% 9 mL irrigation 5,000 Units   BUPivacaine HCl (Marcaine) 0.5 % (5 mg/mL) 5 mL, lidocaine (Xylocaine) 5 mL syringe 10 mL   amLODIPine (Norvasc) tablet 5 mg Cannot be calculated   ammonium lactate (Lac-Hydrin) 12 % lotion Cannot be calculated   aspirin EC tablet 81 mg Cannot be calculated   atorvastatin (Lipitor) tablet 40 mg Cannot be calculated   clopidogrel (Plavix) tablet 75 mg Cannot be calculated   heparin (porcine) injection 5,000 Units Cannot be calculated   metoprolol succinate XL (Toprol-XL) 24 hr tablet 50 mg Cannot be calculated   pantoprazole (ProtoNix) EC tablet 40 mg Cannot be calculated   polyethylene glycol (Glycolax, Miralax) packet 17 g Cannot be calculated   predniSONE (Deltasone) tablet 40 mg Cannot be calculated   venlafaxine XR (Effexor-XR) 24 hr capsule 75 mg Cannot be calculated              Anesthesia Record               Intraprocedure I/O Totals       None           Specimen: No specimens collected     Staff:   Circulator: Parminder Rosado RN  Scrub Person: Yeni Pruitt         Drains and/or Catheters:   External Urinary  Catheter Female (Active)   External Catheter Status In place 02/19/24 2050   Output (mL) 300 mL 03/01/24 0648         Implants:  Implants       Type Name Action Serial No.      Implant POWER PORT, ISP, TI DEVICE W/8FR - [id number] Implanted               Findings: see below     Indications: Daphne Morris is an 68 y.o. female who is having surgery for Stage 3b chronic kidney disease (CKD) (CMS/[facility]) [N18.32].  And acute renal failure     The patient was seen in the preoperative area. The risks, benefits, complications, treatment options, non-operative alternatives, expected recovery and outcomes were discussed with the patient. The possibilities of reaction to medication, pulmonary aspiration, injury to surrounding structures, bleeding, recurrent infection, the need for additional procedures, failure to diagnose a condition, and creating a complication requiring transfusion or operation were discussed with the patient. The patient concurred with the proposed plan, giving informed consent.  The site of surgery was properly noted/marked if necessary per policy. The patient has been actively warmed in preoperative area. Preoperative antibiotics have been ordered and given within 1 hours of incision. Venous thrombosis prophylaxis have been ordered including bilateral sequential compression devices and chemical prophylaxis    Procedure Details:   Patient understood the risks of procedure which include but are not limited to infection, bleeding, vascular injury which may require major surgery to fix, embolism, catheter dislodge/malfunction, risks of anesthesia, even mortality in rare situations. Patient wished to proceed.  The patient was placed in supine and Trendelenburg position. Ultrasound was then used to interrogate the right internal jugular vein, which appeared widely patent. Anesthesia was administered by Anesthesia Service. The patient's right neck, chest, and shoulders were prepped and draped in usual sterile  fashion. The right internal jugular vein was accessed under ultrasound guidance. Venous return was observed.  The guide wire was passed with no resistance. Fluoroscopy confirmed the wire was positioned in the right side of heart. An incision was made on the right chest.  A tunnel was developed between the wire site and the incision. The neck puncture wound was enlarged. The catheter was pulled through the tunnel. The introducer with dilator was placed over the wire without resistance. The dilator and wire were removed and the catheter was placed through the introducer which was peeled away carefully. A final fluoroscopy confirmed proper position of catheter tip. Blood was freely withdrawn from port and flushed with normal saline. 2 ml of heparinized saline solution (500IU/mL) was used as final flush. Good hemostasis was achieved. The catheter was secured with 3-0 Nylon. Skin at neck puncture site was closed with subcuticular 4-0 Vicryl. Skin glue was then applied.     Post procedure CXR was requested.         Complications:  None; patient tolerated the procedure well.    Disposition: PACU - hemodynamically stable.  Condition: stable           Attending Attestation: I was present and scrubbed for the entire procedure.    Sandy Kim  Phone Number: [phone]

## 2024-03-01 NOTE — PROGRESS NOTES
Physical Therapy                 Therapy Communication Note    Patient Name: Daphne Morris  MRN: [id number]  Today's Date: 3/1/2024     Discipline: Physical Therapy    Missed Visit Reason: Missed Visit Reason: Patient refused (Patient stated she did not eat her lunch and was too tired. Patient stated she went for a bone density test and will be leaving for dialysis this afternoon. Confirmed with nurse patient will be in daylisys around 2:00pm.)    Missed Time: Attempt @ 1144    Comment:

## 2024-03-01 NOTE — PROGRESS NOTES
Occupational Therapy                 Therapy Communication Note    Patient Name: Daphne Morris  MRN: [id number]  Today's Date: 3/1/2024     Discipline: Occupational Therapy    Missed Visit Reason: Missed Visit Reason: Patient in a medical procedure (Pt anticipated on going to dialysis soon, no OT this date)    Missed Time: Attempt

## 2024-03-01 NOTE — CARE PLAN
The patient's goals for the shift include      The clinical goals for the shift include Pt will remain HDS this shift and have dialysis line placed    Over the shift, the patient did remain HDS, had line placed, had  dialysis and maintained adequate VS throughout.

## 2024-03-01 NOTE — PROGRESS NOTES
Palliative Care Social Work Note     Attempted to meet with pt.  Pt either sleeping or off the floor.  Palliative care will attempt to see pt again.

## 2024-03-02 LAB
ALBUMIN SERPL BCP-MCNC: 2.5 G/DL (ref 3.4–5)
ANION GAP SERPL CALC-SCNC: 11 MMOL/L (ref 10–20)
BASOPHILS # BLD AUTO: 0.01 X10*3/UL (ref 0–0.1)
BASOPHILS NFR BLD AUTO: 0.1 %
BUN SERPL-MCNC: 74 MG/DL (ref 6–23)
CALCIUM SERPL-MCNC: 7.5 MG/DL (ref 8.6–10.3)
CHLORIDE SERPL-SCNC: 101 MMOL/L (ref 98–107)
CO2 SERPL-SCNC: 30 MMOL/L (ref 21–32)
CREAT SERPL-MCNC: 2.79 MG/DL (ref 0.5–1.05)
EGFRCR SERPLBLD CKD-EPI 2021: 18 ML/MIN/1.73M*2
EOSINOPHIL # BLD AUTO: 0.09 X10*3/UL (ref 0–0.7)
EOSINOPHIL NFR BLD AUTO: 0.9 %
ERYTHROCYTE [DISTWIDTH] IN BLOOD BY AUTOMATED COUNT: 13.9 % (ref 11.5–14.5)
GLUCOSE BLD MANUAL STRIP-MCNC: 124 MG/DL (ref 74–99)
GLUCOSE BLD MANUAL STRIP-MCNC: 133 MG/DL (ref 74–99)
GLUCOSE BLD MANUAL STRIP-MCNC: 153 MG/DL (ref 74–99)
GLUCOSE BLD MANUAL STRIP-MCNC: 199 MG/DL (ref 74–99)
GLUCOSE SERPL-MCNC: 136 MG/DL (ref 74–99)
HCT VFR BLD AUTO: 26.7 % (ref 36–46)
HGB BLD-MCNC: 8 G/DL (ref 12–16)
IMM GRANULOCYTES # BLD AUTO: 0.07 X10*3/UL (ref 0–0.7)
IMM GRANULOCYTES NFR BLD AUTO: 0.7 % (ref 0–0.9)
LYMPHOCYTES # BLD AUTO: 0.62 X10*3/UL (ref 1.2–4.8)
LYMPHOCYTES NFR BLD AUTO: 5.9 %
MAGNESIUM SERPL-MCNC: 2.15 MG/DL (ref 1.6–2.4)
MCH RBC QN AUTO: 29.7 PG (ref 26–34)
MCHC RBC AUTO-ENTMCNC: 30 G/DL (ref 32–36)
MCV RBC AUTO: 99 FL (ref 80–100)
MONOCYTES # BLD AUTO: 1.03 X10*3/UL (ref 0.1–1)
MONOCYTES NFR BLD AUTO: 9.8 %
NEUTROPHILS # BLD AUTO: 8.68 X10*3/UL (ref 1.2–7.7)
NEUTROPHILS NFR BLD AUTO: 82.6 %
NRBC BLD-RTO: 0 /100 WBCS (ref 0–0)
PHOSPHATE SERPL-MCNC: 4 MG/DL (ref 2.5–4.9)
PLATELET # BLD AUTO: 247 X10*3/UL (ref 150–450)
POTASSIUM SERPL-SCNC: 4 MMOL/L (ref 3.5–5.3)
RBC # BLD AUTO: 2.69 X10*6/UL (ref 4–5.2)
SODIUM SERPL-SCNC: 138 MMOL/L (ref 136–145)
WBC # BLD AUTO: 10.5 X10*3/UL (ref 4.4–11.3)

## 2024-03-02 PROCEDURE — 94760 N-INVAS EAR/PLS OXIMETRY 1: CPT

## 2024-03-02 PROCEDURE — 2500000004 HC RX 250 GENERAL PHARMACY W/ HCPCS (ALT 636 FOR OP/ED): Performed by: INTERNAL MEDICINE

## 2024-03-02 PROCEDURE — 85025 COMPLETE CBC W/AUTO DIFF WBC: CPT | Performed by: INTERNAL MEDICINE

## 2024-03-02 PROCEDURE — 82947 ASSAY GLUCOSE BLOOD QUANT: CPT

## 2024-03-02 PROCEDURE — 8010000001 HC DIALYSIS - HEMODIALYSIS PER DAY

## 2024-03-02 PROCEDURE — 2500000002 HC RX 250 W HCPCS SELF ADMINISTERED DRUGS (ALT 637 FOR MEDICARE OP, ALT 636 FOR OP/ED): Performed by: INTERNAL MEDICINE

## 2024-03-02 PROCEDURE — 2500000001 HC RX 250 WO HCPCS SELF ADMINISTERED DRUGS (ALT 637 FOR MEDICARE OP): Performed by: INTERNAL MEDICINE

## 2024-03-02 PROCEDURE — 1200000002 HC GENERAL ROOM WITH TELEMETRY DAILY

## 2024-03-02 PROCEDURE — 83735 ASSAY OF MAGNESIUM: CPT | Performed by: INTERNAL MEDICINE

## 2024-03-02 PROCEDURE — 94640 AIRWAY INHALATION TREATMENT: CPT

## 2024-03-02 PROCEDURE — 99233 SBSQ HOSP IP/OBS HIGH 50: CPT | Performed by: INTERNAL MEDICINE

## 2024-03-02 PROCEDURE — 84295 ASSAY OF SERUM SODIUM: CPT | Performed by: INTERNAL MEDICINE

## 2024-03-02 RX ADMIN — PREDNISONE 40 MG: 20 TABLET ORAL at 09:11

## 2024-03-02 RX ADMIN — VENLAFAXINE HYDROCHLORIDE 75 MG: 75 CAPSULE, EXTENDED RELEASE ORAL at 09:12

## 2024-03-02 RX ADMIN — ASPIRIN 81 MG: 81 TABLET, COATED ORAL at 09:11

## 2024-03-02 RX ADMIN — IPRATROPIUM BROMIDE AND ALBUTEROL SULFATE 3 ML: 2.5; .5 SOLUTION RESPIRATORY (INHALATION) at 08:07

## 2024-03-02 RX ADMIN — PANTOPRAZOLE SODIUM 40 MG: 40 TABLET, DELAYED RELEASE ORAL at 20:30

## 2024-03-02 RX ADMIN — FERROUS GLUCONATE 324 MG: 324 TABLET ORAL at 09:12

## 2024-03-02 RX ADMIN — Medication: at 09:12

## 2024-03-02 RX ADMIN — Medication: at 21:00

## 2024-03-02 RX ADMIN — AMIODARONE HYDROCHLORIDE 200 MG: 200 TABLET ORAL at 09:12

## 2024-03-02 RX ADMIN — POLYETHYLENE GLYCOL 3350 17 G: 17 POWDER, FOR SOLUTION ORAL at 09:12

## 2024-03-02 RX ADMIN — LEVOTHYROXINE SODIUM 200 MCG: 100 TABLET ORAL at 06:20

## 2024-03-02 RX ADMIN — Medication 5 MG: at 20:30

## 2024-03-02 RX ADMIN — PANTOPRAZOLE SODIUM 40 MG: 40 TABLET, DELAYED RELEASE ORAL at 09:12

## 2024-03-02 RX ADMIN — ATORVASTATIN CALCIUM 40 MG: 40 TABLET, FILM COATED ORAL at 09:12

## 2024-03-02 RX ADMIN — IPRATROPIUM BROMIDE AND ALBUTEROL SULFATE 3 ML: 2.5; .5 SOLUTION RESPIRATORY (INHALATION) at 14:31

## 2024-03-02 ASSESSMENT — COGNITIVE AND FUNCTIONAL STATUS - GENERAL
MOVING FROM LYING ON BACK TO SITTING ON SIDE OF FLAT BED WITH BEDRAILS: A LOT
TURNING FROM BACK TO SIDE WHILE IN FLAT BAD: A LOT
STANDING UP FROM CHAIR USING ARMS: TOTAL
DRESSING REGULAR LOWER BODY CLOTHING: A LOT
TURNING FROM BACK TO SIDE WHILE IN FLAT BAD: A LOT
MOVING TO AND FROM BED TO CHAIR: TOTAL
DAILY ACTIVITIY SCORE: 12
TOILETING: TOTAL
WALKING IN HOSPITAL ROOM: TOTAL
MOBILITY SCORE: 8
DRESSING REGULAR UPPER BODY CLOTHING: A LOT
HELP NEEDED FOR BATHING: A LOT
CLIMB 3 TO 5 STEPS WITH RAILING: TOTAL
DRESSING REGULAR UPPER BODY CLOTHING: A LOT
HELP NEEDED FOR BATHING: A LOT
DAILY ACTIVITIY SCORE: 12
MOBILITY SCORE: 8
PERSONAL GROOMING: A LOT
WALKING IN HOSPITAL ROOM: TOTAL
EATING MEALS: A LITTLE
EATING MEALS: A LITTLE
TOILETING: TOTAL
STANDING UP FROM CHAIR USING ARMS: TOTAL
MOVING TO AND FROM BED TO CHAIR: TOTAL
PERSONAL GROOMING: A LOT
CLIMB 3 TO 5 STEPS WITH RAILING: TOTAL
MOVING FROM LYING ON BACK TO SITTING ON SIDE OF FLAT BED WITH BEDRAILS: A LOT
DRESSING REGULAR LOWER BODY CLOTHING: A LOT

## 2024-03-02 ASSESSMENT — PAIN SCALES - GENERAL
PAINLEVEL_OUTOF10: 0 - NO PAIN

## 2024-03-02 ASSESSMENT — PAIN - FUNCTIONAL ASSESSMENT
PAIN_FUNCTIONAL_ASSESSMENT: 0-10
PAIN_FUNCTIONAL_ASSESSMENT: 0-10

## 2024-03-02 NOTE — PROGRESS NOTES
Daphne Morris is a 68 y.o. female on day 12 of admission presenting with Heart failure (CMS/HCC).      Subjective   Seen and examined, no new complaints.  No overnight events.  The plan discussed with the patient and RN.    Objective     Last Recorded Vitals  /61 (BP Location: Right arm, Patient Position: Lying)   Pulse 62   Temp 36.5 °C (97.7 °F) (Temporal)   Resp 15   Wt 97.8 kg (215 lb 9.8 oz)   SpO2 96%   Intake/Output last 3 Shifts:    Intake/Output Summary (Last 24 hours) at 3/2/2024 1508  Last data filed at 3/2/2024 1358  Gross per 24 hour   Intake 1000 ml   Output 3200 ml   Net -2200 ml       Admission Weight  Weight: 95.4 kg (210 lb 5.1 oz) (02/19/24 0438)    Daily Weight  03/01/24 : 97.8 kg (215 lb 9.8 oz)    Image Results  XR chest 1 view  Narrative: Interpreted By:  Andreea Harmon,   STUDY:  XR CHEST 1 VIEW;      INDICATION:  Signs/Symptoms:RIJ HD catheter placement.      COMPARISON:  Chest radiograph dated 02/21/2024      ACCESSION NUMBER(S):  [id number]      ORDERING CLINICIAN:  JOSHUA SPANGLER      FINDINGS:  Cardiac silhouette is diffusely enlarged. There is interval placement  of right IJ dialysis catheter with its tip projecting over the  expected region of proximal SVC. There is persistent evidence of  moderate to large right thoracic pleural effusion, slightly improved  compared to prior radiograph. There is also possible small left  pleural effusion. There are prominent interstitial markings in  bilateral lungs concerning for interstitial edema. No large  pneumothorax within limits of portable technique. No acute osseous  findings.      Impression: 1. Interval placement of right IJ dialysis catheter with its tip  projecting over the expected location of proximal SVC. No large  pneumothorax.  2. Redemonstration of cardiomegaly with right thoracic pleural  effusion, slightly improved compared to prior radiograph. Possible  small left pleural effusion.  3. Redemonstration of interstitial  edema.      Signed by: Andreea Harmon 3/1/2024 10:29 AM  Dictation workstation:   [id number]  FL less than 1 hour  These images are not reportable by radiology and will not be interpreted   by  Radiologists.      Physical Exam  Constitutional: patient is alert and cooperative with exam  skin: dry and warm  Eyes: EOMI and clear sclera  ENMT: moist mucous membranes  Head/Neck: neck supple   Respiratory/Thorax: Clear to auscultation bilaterally, no wheezing or crackles appreciated  Cardiovascular: regular rate and rhythm, S1 and S2 present, no murmurs heard  Gastrointestinal: bowel sounds present, no pain or tenderness upon palpation, soft and nondistended  Extremities: +2 radial, posterior tibial, and pedal pulse, no lower extremity edema noted  Neurological: AxOx3  Right subclavian line with no active signs of bleeding or if infection    Relevant Results  Scheduled medications  amiodarone, 200 mg, oral, Daily with breakfast  ammonium lactate, , Topical, BID  aspirin, 81 mg, oral, Daily  atorvastatin, 40 mg, oral, Daily  [Held by provider] clopidogrel, 75 mg, oral, Daily  ferrous gluconate, 324 mg, oral, Daily with breakfast  [Held by provider] heparin (porcine), 5,000 Units, subcutaneous, q8h  heparin, 1,700 Units, hemodialysis, After Dialysis  heparin, 1,800 Units, hemodialysis, After Dialysis  [Held by provider] insulin glargine, 14 Units, subcutaneous, Nightly  insulin lispro, 0-15 Units, subcutaneous, TID with meals  ipratropium-albuteroL, 3 mL, nebulization, TID  levothyroxine, 200 mcg, oral, Daily before breakfast  lidocaine, 5 mL, infiltration, Once  melatonin, 5 mg, oral, Daily  pantoprazole, 40 mg, oral, BID  polyethylene glycol, 17 g, oral, Daily  predniSONE, 40 mg, oral, Daily  venlafaxine XR, 75 mg, oral, Daily      Continuous medications  [Held by provider] furosemide, 10 mg/hr, Last Rate: 10 mg/hr (02/22/24 1927)      PRN medications  PRN medications: acetaminophen **OR** acetaminophen **OR**  acetaminophen, acetaminophen **OR** acetaminophen **OR** acetaminophen, albuterol, dextrose 10 % in water (D10W), dextrose, diphenhydrAMINE, glucagon, ipratropium-albuteroL, ondansetron **OR** ondansetron, oxygen, oxygen    Results for orders placed or performed during the hospital encounter of 02/19/24 (from the past 24 hour(s))   POCT GLUCOSE   Result Value Ref Range    POCT Glucose 185 (H) 74 - 99 mg/dL   POCT GLUCOSE   Result Value Ref Range    POCT Glucose 185 (H) 74 - 99 mg/dL   CBC and Auto Differential   Result Value Ref Range    WBC 10.5 4.4 - 11.3 x10*3/uL    nRBC 0.0 0.0 - 0.0 /100 WBCs    RBC 2.69 (L) 4.00 - 5.20 x10*6/uL    Hemoglobin 8.0 (L) 12.0 - 16.0 g/dL    Hematocrit 26.7 (L) 36.0 - 46.0 %    MCV 99 80 - 100 fL    MCH 29.7 26.0 - 34.0 pg    MCHC 30.0 (L) 32.0 - 36.0 g/dL    RDW 13.9 11.5 - 14.5 %    Platelets 247 150 - 450 x10*3/uL    Neutrophils % 82.6 40.0 - 80.0 %    Immature Granulocytes %, Automated 0.7 0.0 - 0.9 %    Lymphocytes % 5.9 13.0 - 44.0 %    Monocytes % 9.8 2.0 - 10.0 %    Eosinophils % 0.9 0.0 - 6.0 %    Basophils % 0.1 0.0 - 2.0 %    Neutrophils Absolute 8.68 (H) 1.20 - 7.70 x10*3/uL    Immature Granulocytes Absolute, Automated 0.07 0.00 - 0.70 x10*3/uL    Lymphocytes Absolute 0.62 (L) 1.20 - 4.80 x10*3/uL    Monocytes Absolute 1.03 (H) 0.10 - 1.00 x10*3/uL    Eosinophils Absolute 0.09 0.00 - 0.70 x10*3/uL    Basophils Absolute 0.01 0.00 - 0.10 x10*3/uL   Renal Function Panel   Result Value Ref Range    Glucose 136 (H) 74 - 99 mg/dL    Sodium 138 136 - 145 mmol/L    Potassium 4.0 3.5 - 5.3 mmol/L    Chloride 101 98 - 107 mmol/L    Bicarbonate 30 21 - 32 mmol/L    Anion Gap 11 10 - 20 mmol/L    Urea Nitrogen 74 (H) 6 - 23 mg/dL    Creatinine 2.79 (H) 0.50 - 1.05 mg/dL    eGFR 18 (L) >60 mL/min/1.73m*2    Calcium 7.5 (L) 8.6 - 10.3 mg/dL    Phosphorus 4.0 2.5 - 4.9 mg/dL    Albumin 2.5 (L) 3.4 - 5.0 g/dL   Magnesium   Result Value Ref Range    Magnesium 2.15 1.60 - 2.40 mg/dL    POCT GLUCOSE   Result Value Ref Range    POCT Glucose 133 (H) 74 - 99 mg/dL       Assessment/Plan      Principal Problem:    Heart failure (CMS/[facility])  Active Problems:    Stage 3b chronic kidney disease (CKD) (CMS/[facility])    Daphne Morris is a 68 y.o. female presenting with past medical history of heart failure, atrial fibrillation, Hypertension, type 2 DM transfer from [facility] ER due to acute hypoxic respiratory failure secondary to pneumonia, COPD exacerbation and acute diastolic heart failure.     #Stage III/IV chronic kidney disease  Status post right nephrectomy  Nephrology on board, family are agreeable with hemodialysis, unable to get a dialysis catheter over the weekend,   -General surgery on board, hemodialysis line placed DOS 03/01/24  -Patient tolerated the first session of hemodialysis well on 03/01/24      #Acute hypoxic respiratory failure secondary to acute on chronic diastolic heart failure and influenza B pneumonia  -Leukocytosis today we will continue to monitor  -Cardiology on board, unable to do left heart cath due to elevated creatinine, unable to start SGLT2 inhibitors as well  -Pulmonary on board plan to continue prednisone, breathing treatment, patient finished Tamiflu    #Anemia  -Status post 1 unit of blood   -FOBT positive  -A iron saturation of 11 iron level of 24 ferritin in 200  -On ferrous gluconate      #Paroxysmal atrial fibrillation  Status post watchman in August 2023  Currently in sinus rhythm  Continue amiodarone and metoprolol     #Type 2 diabetes mellitus  Continue insulin sliding scale  Holding Lantus due to limited oral intake  Holding Januvia  Continue diabetic diet  Follow Accu-Cheks     #Hypertension  Metoprolol      #GERD  -PPI     #yperlipidemia  Atorvastatin     #Depression  Venlafaxine     #Hypothyroidism  Continue Synthroid     #DVT prophylaxis  Heparin     #Disposition  -Patient met with hospice, the patient and her family are not ready to sign with hospice right  now.  -CODE STATUS changed to DNR/DNI  -Nephrology on board patient started hemodialysis yesterday    Myke Newsome MD

## 2024-03-02 NOTE — PRE-PROCEDURE NOTE
Report from Sending RN:    Report From: Juju Mitchell  Recent Surgery of Procedure: No,   Baseline Level of Consciousness (LOC): Alert  Oxygen Use: No  Type: Nasal canula  Diabetic: No  Last BP Med Given Day of Dialysis:   Last Pain Med Given:   Lab Tests to be Obtained with Dialysis: No  Blood Transfusion to be Given During Dialysis: No  Available IV Access: Yes  Medications to be Administered During Dialysis: No  Continuous IV Infusion Running: No  Restraints on Currently or in the Last 24 Hours: No  Hand-Off Communication:

## 2024-03-02 NOTE — CARE PLAN
The patient's goals for the shift include      The clinical goals for the shift include pt will remain HDS    Over the shift, the patient did not make progress toward the following goals. Barriers to progression include patient remained HDS. Recommendations to address these barriers include cotninue with plan of care.

## 2024-03-02 NOTE — PROGRESS NOTES
Physical Therapy                 Therapy Communication Note    Patient Name: Daphne Morris  MRN: [id number]  Today's Date: 3/2/2024     Discipline: Physical Therapy    Missed Visit Reason: Missed Visit Reason: Patient refused (Patient stated she just had dialysis and was exhausted. Confirmed with nurse. Educated patient the benefits of therapy and that they will likely try and get her out of bed next time. Patient verbally agreed.)    Missed Time: Attempt @ 1352    Comment:

## 2024-03-02 NOTE — POST-PROCEDURE NOTE
Report to Receiving RN:    Report To: Juju Mitchell  Time Report Called: [id number]  Hand-Off Communication: Verbal  Complications During Treatment: No  Ultrafiltration Treatment: No  Medications Administered During Dialysis: No  Blood Products Administered During Dialysis: No  Labs Sent During Dialysis: No  Heparin Drip Rate Changes: No    Electronic Signatures:  Danilo Wade   Authored:   (Signed    Authored:     Last Updated: 1:55 PM by DANILO WADE

## 2024-03-03 LAB
ALBUMIN SERPL BCP-MCNC: 2.3 G/DL (ref 3.4–5)
ANION GAP SERPL CALC-SCNC: 11 MMOL/L (ref 10–20)
BASOPHILS # BLD AUTO: 0.01 X10*3/UL (ref 0–0.1)
BASOPHILS NFR BLD AUTO: 0.1 %
BUN SERPL-MCNC: 44 MG/DL (ref 6–23)
CALCIUM SERPL-MCNC: 7.1 MG/DL (ref 8.6–10.3)
CHLORIDE SERPL-SCNC: 100 MMOL/L (ref 98–107)
CO2 SERPL-SCNC: 30 MMOL/L (ref 21–32)
CREAT SERPL-MCNC: 1.89 MG/DL (ref 0.5–1.05)
EGFRCR SERPLBLD CKD-EPI 2021: 28 ML/MIN/1.73M*2
EOSINOPHIL # BLD AUTO: 0 X10*3/UL (ref 0–0.7)
EOSINOPHIL NFR BLD AUTO: 0 %
ERYTHROCYTE [DISTWIDTH] IN BLOOD BY AUTOMATED COUNT: 13.6 % (ref 11.5–14.5)
GLUCOSE BLD MANUAL STRIP-MCNC: 189 MG/DL (ref 74–99)
GLUCOSE BLD MANUAL STRIP-MCNC: 245 MG/DL (ref 74–99)
GLUCOSE BLD MANUAL STRIP-MCNC: 253 MG/DL (ref 74–99)
GLUCOSE BLD MANUAL STRIP-MCNC: 313 MG/DL (ref 74–99)
GLUCOSE SERPL-MCNC: 250 MG/DL (ref 74–99)
HCT VFR BLD AUTO: 25.6 % (ref 36–46)
HGB BLD-MCNC: 7.8 G/DL (ref 12–16)
IMM GRANULOCYTES # BLD AUTO: 0.07 X10*3/UL (ref 0–0.7)
IMM GRANULOCYTES NFR BLD AUTO: 0.7 % (ref 0–0.9)
LYMPHOCYTES # BLD AUTO: 0.12 X10*3/UL (ref 1.2–4.8)
LYMPHOCYTES NFR BLD AUTO: 1.2 %
MAGNESIUM SERPL-MCNC: 1.93 MG/DL (ref 1.6–2.4)
MCH RBC QN AUTO: 29.7 PG (ref 26–34)
MCHC RBC AUTO-ENTMCNC: 30.5 G/DL (ref 32–36)
MCV RBC AUTO: 97 FL (ref 80–100)
MONOCYTES # BLD AUTO: 0.13 X10*3/UL (ref 0.1–1)
MONOCYTES NFR BLD AUTO: 1.3 %
NEUTROPHILS # BLD AUTO: 9.33 X10*3/UL (ref 1.2–7.7)
NEUTROPHILS NFR BLD AUTO: 96.7 %
NRBC BLD-RTO: 0 /100 WBCS (ref 0–0)
PHOSPHATE SERPL-MCNC: 3.5 MG/DL (ref 2.5–4.9)
PLATELET # BLD AUTO: 192 X10*3/UL (ref 150–450)
POTASSIUM SERPL-SCNC: 4.1 MMOL/L (ref 3.5–5.3)
RBC # BLD AUTO: 2.63 X10*6/UL (ref 4–5.2)
SODIUM SERPL-SCNC: 137 MMOL/L (ref 136–145)
WBC # BLD AUTO: 9.7 X10*3/UL (ref 4.4–11.3)

## 2024-03-03 PROCEDURE — 2500000004 HC RX 250 GENERAL PHARMACY W/ HCPCS (ALT 636 FOR OP/ED): Performed by: INTERNAL MEDICINE

## 2024-03-03 PROCEDURE — 85025 COMPLETE CBC W/AUTO DIFF WBC: CPT | Performed by: INTERNAL MEDICINE

## 2024-03-03 PROCEDURE — 83735 ASSAY OF MAGNESIUM: CPT | Performed by: INTERNAL MEDICINE

## 2024-03-03 PROCEDURE — 99233 SBSQ HOSP IP/OBS HIGH 50: CPT | Performed by: INTERNAL MEDICINE

## 2024-03-03 PROCEDURE — 2500000001 HC RX 250 WO HCPCS SELF ADMINISTERED DRUGS (ALT 637 FOR MEDICARE OP): Performed by: INTERNAL MEDICINE

## 2024-03-03 PROCEDURE — 2500000002 HC RX 250 W HCPCS SELF ADMINISTERED DRUGS (ALT 637 FOR MEDICARE OP, ALT 636 FOR OP/ED): Performed by: NURSE PRACTITIONER

## 2024-03-03 PROCEDURE — 1200000002 HC GENERAL ROOM WITH TELEMETRY DAILY

## 2024-03-03 PROCEDURE — 94640 AIRWAY INHALATION TREATMENT: CPT

## 2024-03-03 PROCEDURE — 82947 ASSAY GLUCOSE BLOOD QUANT: CPT

## 2024-03-03 PROCEDURE — 2500000002 HC RX 250 W HCPCS SELF ADMINISTERED DRUGS (ALT 637 FOR MEDICARE OP, ALT 636 FOR OP/ED): Performed by: INTERNAL MEDICINE

## 2024-03-03 PROCEDURE — 94668 MNPJ CHEST WALL SBSQ: CPT

## 2024-03-03 PROCEDURE — 84100 ASSAY OF PHOSPHORUS: CPT | Performed by: INTERNAL MEDICINE

## 2024-03-03 RX ORDER — INSULIN GLARGINE 100 [IU]/ML
14 INJECTION, SOLUTION SUBCUTANEOUS EVERY 24 HOURS
Status: DISCONTINUED | OUTPATIENT
Start: 2024-03-03 | End: 2024-03-05 | Stop reason: HOSPADM

## 2024-03-03 RX ORDER — INSULIN LISPRO 100 [IU]/ML
8 INJECTION, SOLUTION INTRAVENOUS; SUBCUTANEOUS ONCE
Status: COMPLETED | OUTPATIENT
Start: 2024-03-03 | End: 2024-03-03

## 2024-03-03 RX ADMIN — ASPIRIN 81 MG: 81 TABLET, COATED ORAL at 08:31

## 2024-03-03 RX ADMIN — INSULIN LISPRO 3 UNITS: 100 INJECTION, SOLUTION INTRAVENOUS; SUBCUTANEOUS at 12:36

## 2024-03-03 RX ADMIN — INSULIN LISPRO 8 UNITS: 100 INJECTION, SOLUTION INTRAVENOUS; SUBCUTANEOUS at 22:51

## 2024-03-03 RX ADMIN — IPRATROPIUM BROMIDE AND ALBUTEROL SULFATE 3 ML: 2.5; .5 SOLUTION RESPIRATORY (INHALATION) at 07:49

## 2024-03-03 RX ADMIN — PANTOPRAZOLE SODIUM 40 MG: 40 TABLET, DELAYED RELEASE ORAL at 08:31

## 2024-03-03 RX ADMIN — INSULIN GLARGINE 14 UNITS: 100 INJECTION, SOLUTION SUBCUTANEOUS at 22:51

## 2024-03-03 RX ADMIN — Medication: at 08:32

## 2024-03-03 RX ADMIN — AMIODARONE HYDROCHLORIDE 200 MG: 200 TABLET ORAL at 08:31

## 2024-03-03 RX ADMIN — FERROUS GLUCONATE 324 MG: 324 TABLET ORAL at 08:31

## 2024-03-03 RX ADMIN — IPRATROPIUM BROMIDE AND ALBUTEROL SULFATE 3 ML: 2.5; .5 SOLUTION RESPIRATORY (INHALATION) at 19:44

## 2024-03-03 RX ADMIN — PANTOPRAZOLE SODIUM 40 MG: 40 TABLET, DELAYED RELEASE ORAL at 21:40

## 2024-03-03 RX ADMIN — Medication 5 MG: at 21:40

## 2024-03-03 RX ADMIN — INSULIN LISPRO 9 UNITS: 100 INJECTION, SOLUTION INTRAVENOUS; SUBCUTANEOUS at 08:41

## 2024-03-03 RX ADMIN — PREDNISONE 40 MG: 20 TABLET ORAL at 08:32

## 2024-03-03 RX ADMIN — IPRATROPIUM BROMIDE AND ALBUTEROL SULFATE 3 ML: 2.5; .5 SOLUTION RESPIRATORY (INHALATION) at 15:02

## 2024-03-03 RX ADMIN — ATORVASTATIN CALCIUM 40 MG: 40 TABLET, FILM COATED ORAL at 08:31

## 2024-03-03 RX ADMIN — LEVOTHYROXINE SODIUM 200 MCG: 100 TABLET ORAL at 06:00

## 2024-03-03 RX ADMIN — POLYETHYLENE GLYCOL 3350 17 G: 17 POWDER, FOR SOLUTION ORAL at 08:32

## 2024-03-03 RX ADMIN — VENLAFAXINE HYDROCHLORIDE 75 MG: 75 CAPSULE, EXTENDED RELEASE ORAL at 08:31

## 2024-03-03 RX ADMIN — INSULIN LISPRO 6 UNITS: 100 INJECTION, SOLUTION INTRAVENOUS; SUBCUTANEOUS at 17:49

## 2024-03-03 RX ADMIN — Medication: at 21:00

## 2024-03-03 ASSESSMENT — COGNITIVE AND FUNCTIONAL STATUS - GENERAL
PERSONAL GROOMING: A LOT
MOVING TO AND FROM BED TO CHAIR: TOTAL
TOILETING: TOTAL
DAILY ACTIVITIY SCORE: 12
MOVING FROM LYING ON BACK TO SITTING ON SIDE OF FLAT BED WITH BEDRAILS: A LOT
MOVING FROM LYING ON BACK TO SITTING ON SIDE OF FLAT BED WITH BEDRAILS: A LOT
EATING MEALS: A LITTLE
WALKING IN HOSPITAL ROOM: TOTAL
EATING MEALS: A LITTLE
CLIMB 3 TO 5 STEPS WITH RAILING: TOTAL
DRESSING REGULAR UPPER BODY CLOTHING: A LOT
PERSONAL GROOMING: A LOT
TOILETING: TOTAL
WALKING IN HOSPITAL ROOM: TOTAL
MOBILITY SCORE: 8
STANDING UP FROM CHAIR USING ARMS: TOTAL
MOVING TO AND FROM BED TO CHAIR: TOTAL
MOBILITY SCORE: 8
DRESSING REGULAR LOWER BODY CLOTHING: A LOT
DRESSING REGULAR UPPER BODY CLOTHING: A LOT
DRESSING REGULAR LOWER BODY CLOTHING: A LOT
HELP NEEDED FOR BATHING: A LOT
TURNING FROM BACK TO SIDE WHILE IN FLAT BAD: A LOT
HELP NEEDED FOR BATHING: A LOT
DAILY ACTIVITIY SCORE: 12
STANDING UP FROM CHAIR USING ARMS: TOTAL
TURNING FROM BACK TO SIDE WHILE IN FLAT BAD: A LOT
CLIMB 3 TO 5 STEPS WITH RAILING: TOTAL

## 2024-03-03 ASSESSMENT — PAIN SCALES - GENERAL
PAINLEVEL_OUTOF10: 0 - NO PAIN
PAINLEVEL_OUTOF10: 0 - NO PAIN

## 2024-03-03 ASSESSMENT — PAIN - FUNCTIONAL ASSESSMENT: PAIN_FUNCTIONAL_ASSESSMENT: 0-10

## 2024-03-03 NOTE — CARE PLAN
The clinical goals for the shift include Patient will remain HDS throughout shift.      Problem: Respiratory  Goal: Clear secretions with interventions this shift  Outcome: Progressing     Problem: Skin  Goal: Promote skin healing  Outcome: Progressing  Flowsheets (Taken 3/3/2024 0410)  Promote skin healing: Turn/reposition every 2 hours/use positioning/transfer devices     Problem: Pain  Goal: My pain/discomfort is manageable  Outcome: Progressing     Problem: Safety  Goal: Patient will be injury free during hospitalization  Outcome: Progressing     Problem: Pain - Adult  Goal: Verbalizes/displays adequate comfort level or baseline comfort level  Outcome: Progressing

## 2024-03-03 NOTE — PROGRESS NOTES
Daphne Morris is a 68 y.o. female on day 13 of admission presenting with Heart failure (CMS/HCC).      Subjective   Seen and examined.  Resting comfortably and had dialysis yesterday.  No chest pain.  Still on oxygen.       Objective          Vitals 24HR  Heart Rate:  [62-86]   Temp:  [36.4 °C (97.5 °F)-37.2 °C (99 °F)]   Resp:  [13-19]   BP: (153-162)/(60-79)   Weight:  [95.1 kg (209 lb 9.6 oz)]   SpO2:  [95 %-98 %]         Intake/Output last 3 Shifts:    Intake/Output Summary (Last 24 hours) at 3/3/2024 0747  Last data filed at 3/3/2024 0600  Gross per 24 hour   Intake 400 ml   Output 2600 ml   Net -2200 ml       Physical Exam    Elderly female currently in no distress.  Appears older than stated age.  Generalized edema.  Lungs with decreased breath sounds.    Relevant Results    Scheduled medications  amiodarone, 200 mg, oral, Daily with breakfast  ammonium lactate, , Topical, BID  aspirin, 81 mg, oral, Daily  atorvastatin, 40 mg, oral, Daily  [Held by provider] clopidogrel, 75 mg, oral, Daily  ferrous gluconate, 324 mg, oral, Daily with breakfast  [Held by provider] heparin (porcine), 5,000 Units, subcutaneous, q8h  heparin, 1,700 Units, hemodialysis, After Dialysis  heparin, 1,800 Units, hemodialysis, After Dialysis  [Held by provider] insulin glargine, 14 Units, subcutaneous, Nightly  insulin lispro, 0-15 Units, subcutaneous, TID with meals  ipratropium-albuteroL, 3 mL, nebulization, TID  levothyroxine, 200 mcg, oral, Daily before breakfast  lidocaine, 5 mL, infiltration, Once  melatonin, 5 mg, oral, Daily  pantoprazole, 40 mg, oral, BID  polyethylene glycol, 17 g, oral, Daily  predniSONE, 40 mg, oral, Daily  venlafaxine XR, 75 mg, oral, Daily      Continuous medications  [Held by provider] furosemide, 10 mg/hr, Last Rate: 10 mg/hr (02/22/24 1927)      PRN medications  PRN medications: acetaminophen **OR** acetaminophen **OR** acetaminophen, acetaminophen **OR** acetaminophen **OR** acetaminophen, albuterol,  dextrose 10 % in water (D10W), dextrose, diphenhydrAMINE, glucagon, ipratropium-albuteroL, ondansetron **OR** ondansetron, oxygen, oxygen       Results for orders placed or performed during the hospital encounter of 02/19/24 (from the past 24 hour(s))   POCT GLUCOSE   Result Value Ref Range    POCT Glucose 124 (H) 74 - 99 mg/dL   POCT GLUCOSE   Result Value Ref Range    POCT Glucose 153 (H) 74 - 99 mg/dL   POCT GLUCOSE   Result Value Ref Range    POCT Glucose 199 (H) 74 - 99 mg/dL   Renal Function Panel   Result Value Ref Range    Glucose 250 (H) 74 - 99 mg/dL    Sodium 137 136 - 145 mmol/L    Potassium 4.1 3.5 - 5.3 mmol/L    Chloride 100 98 - 107 mmol/L    Bicarbonate 30 21 - 32 mmol/L    Anion Gap 11 10 - 20 mmol/L    Urea Nitrogen 44 (H) 6 - 23 mg/dL    Creatinine 1.89 (H) 0.50 - 1.05 mg/dL    eGFR 28 (L) >60 mL/min/1.73m*2    Calcium 7.1 (L) 8.6 - 10.3 mg/dL    Phosphorus 3.5 2.5 - 4.9 mg/dL    Albumin 2.3 (L) 3.4 - 5.0 g/dL   Magnesium   Result Value Ref Range    Magnesium 1.93 1.60 - 2.40 mg/dL   POCT GLUCOSE   Result Value Ref Range    POCT Glucose 253 (H) 74 - 99 mg/dL           Assessment/Plan      Daphne Morris  is a 68 y.o. female who has past medical history of  heart failure, atrial fibrillation s/p Watchman, right nephrectomy, hypertension, type 2 DM admitted for acute hypoxic respiratory failure 2/2 to pneumonia and acute decompensated heart failure.        # ANDREA on CKD   -Likely  Cardiorenal syndrome 2/2 Acute decompensated CHF  -(CKD) 4/A3-baseline SCr 1.8-2.5 most likely atherosclerotic cardiovascular disease/diabetic nephropathy, prior nephrectomy.  She follows with Dr. Mitchell  - Peaked Scr during this admission 3-4.09, GFR 11   -Kidney ultrasound with no obstruction on the left side.  Right total nephrectomy noticed  -Initiated dialysis this admission due to refractory volume overload.     Plan  Plan dialysis tomorrow with ultrafiltration as tolerated.  Will need extra UF probably on Tuesday  since she still has significant volume overload.  Continue with supportive therapy.    Will need outpatient dialysis set up at [facility].  She will follow-up with her primary nephrologist Dr. Mitchell after discharge    García Gaspar MD

## 2024-03-03 NOTE — PROGRESS NOTES
Daphne Morris is a 68 y.o. female on day 13 of admission presenting with Heart failure (CMS/HCC).      Subjective   Seen and examined, no new complaints.  No overnight events.  The plan discussed with the patient and RN.    Objective     Last Recorded Vitals  /68   Pulse 76   Temp 36.7 °C (98.1 °F)   Resp 18   Wt 95.1 kg (209 lb 9.6 oz)   SpO2 96%   Intake/Output last 3 Shifts:    Intake/Output Summary (Last 24 hours) at 3/3/2024 1429  Last data filed at 3/3/2024 0830  Gross per 24 hour   Intake 240 ml   Output 600 ml   Net -360 ml       Admission Weight  Weight: 95.4 kg (210 lb 5.1 oz) (02/19/24 0438)    Daily Weight  03/03/24 : 95.1 kg (209 lb 9.6 oz)    Image Results  XR chest 1 view  Narrative: Interpreted By:  Andreea Harmon,   STUDY:  XR CHEST 1 VIEW;      INDICATION:  Signs/Symptoms:RIJ HD catheter placement.      COMPARISON:  Chest radiograph dated 02/21/2024      ACCESSION NUMBER(S):  [id number]      ORDERING CLINICIAN:  JOSHUA SPANGLER      FINDINGS:  Cardiac silhouette is diffusely enlarged. There is interval placement  of right IJ dialysis catheter with its tip projecting over the  expected region of proximal SVC. There is persistent evidence of  moderate to large right thoracic pleural effusion, slightly improved  compared to prior radiograph. There is also possible small left  pleural effusion. There are prominent interstitial markings in  bilateral lungs concerning for interstitial edema. No large  pneumothorax within limits of portable technique. No acute osseous  findings.      Impression: 1. Interval placement of right IJ dialysis catheter with its tip  projecting over the expected location of proximal SVC. No large  pneumothorax.  2. Redemonstration of cardiomegaly with right thoracic pleural  effusion, slightly improved compared to prior radiograph. Possible  small left pleural effusion.  3. Redemonstration of interstitial edema.      Signed by: Andreea Harmon 3/1/2024 10:29  AM  Dictation workstation:   [id number]  FL less than 1 hour  These images are not reportable by radiology and will not be interpreted   by  Radiologists.      Physical Exam  Constitutional: patient is alert and cooperative with exam  skin: dry and warm  Eyes: EOMI and clear sclera  ENMT: moist mucous membranes  Head/Neck: neck supple   Respiratory/Thorax: Clear to auscultation bilaterally, no wheezing or crackles appreciated  Cardiovascular: regular rate and rhythm, S1 and S2 present, no murmurs heard  Gastrointestinal: bowel sounds present, no pain or tenderness upon palpation, soft and nondistended  Extremities: +2 radial, posterior tibial, and pedal pulse, no lower extremity edema noted  Neurological: AxOx3  Right subclavian line with no active signs of bleeding or if infection    Relevant Results  Scheduled medications  amiodarone, 200 mg, oral, Daily with breakfast  ammonium lactate, , Topical, BID  aspirin, 81 mg, oral, Daily  atorvastatin, 40 mg, oral, Daily  [Held by provider] clopidogrel, 75 mg, oral, Daily  ferrous gluconate, 324 mg, oral, Daily with breakfast  [Held by provider] heparin (porcine), 5,000 Units, subcutaneous, q8h  heparin, 1,700 Units, hemodialysis, After Dialysis  heparin, 1,800 Units, hemodialysis, After Dialysis  [Held by provider] insulin glargine, 14 Units, subcutaneous, Nightly  insulin lispro, 0-15 Units, subcutaneous, TID with meals  ipratropium-albuteroL, 3 mL, nebulization, TID  levothyroxine, 200 mcg, oral, Daily before breakfast  lidocaine, 5 mL, infiltration, Once  melatonin, 5 mg, oral, Daily  pantoprazole, 40 mg, oral, BID  polyethylene glycol, 17 g, oral, Daily  predniSONE, 40 mg, oral, Daily  venlafaxine XR, 75 mg, oral, Daily      Continuous medications  [Held by provider] furosemide, 10 mg/hr, Last Rate: 10 mg/hr (02/22/24 1927)      PRN medications  PRN medications: acetaminophen **OR** acetaminophen **OR** acetaminophen, acetaminophen **OR** acetaminophen **OR**  acetaminophen, albuterol, dextrose 10 % in water (D10W), dextrose, diphenhydrAMINE, glucagon, ipratropium-albuteroL, ondansetron **OR** ondansetron, oxygen, oxygen    Results for orders placed or performed during the hospital encounter of 02/19/24 (from the past 24 hour(s))   POCT GLUCOSE   Result Value Ref Range    POCT Glucose 153 (H) 74 - 99 mg/dL   POCT GLUCOSE   Result Value Ref Range    POCT Glucose 199 (H) 74 - 99 mg/dL   Renal Function Panel   Result Value Ref Range    Glucose 250 (H) 74 - 99 mg/dL    Sodium 137 136 - 145 mmol/L    Potassium 4.1 3.5 - 5.3 mmol/L    Chloride 100 98 - 107 mmol/L    Bicarbonate 30 21 - 32 mmol/L    Anion Gap 11 10 - 20 mmol/L    Urea Nitrogen 44 (H) 6 - 23 mg/dL    Creatinine 1.89 (H) 0.50 - 1.05 mg/dL    eGFR 28 (L) >60 mL/min/1.73m*2    Calcium 7.1 (L) 8.6 - 10.3 mg/dL    Phosphorus 3.5 2.5 - 4.9 mg/dL    Albumin 2.3 (L) 3.4 - 5.0 g/dL   Magnesium   Result Value Ref Range    Magnesium 1.93 1.60 - 2.40 mg/dL   POCT GLUCOSE   Result Value Ref Range    POCT Glucose 253 (H) 74 - 99 mg/dL   POCT GLUCOSE   Result Value Ref Range    POCT Glucose 189 (H) 74 - 99 mg/dL       Assessment/Plan      Principal Problem:    Heart failure (CMS/[facility])  Active Problems:    Stage 3b chronic kidney disease (CKD) (CMS/[facility])    Daphne Morris is a 68 y.o. female presenting with past medical history of heart failure, atrial fibrillation, Hypertension, type 2 DM transfer from [facility] ER due to acute hypoxic respiratory failure secondary to pneumonia, COPD exacerbation and acute diastolic heart failure.     #Stage III/IV chronic kidney disease  Status post right nephrectomy  Nephrology on board, family are agreeable with hemodialysis, unable to get a dialysis catheter over the weekend,   -General surgery on board, hemodialysis line placed DOS 03/01/24  -Patient tolerated the first session of hemodialysis well on 03/01/24, nephrology planning for ultrafiltration dialysis tomorrow and another session of  ultrafiltration possibly on Tuesday.       #Acute hypoxic respiratory failure secondary to acute on chronic diastolic heart failure and influenza B pneumonia  -Leukocytosis today we will continue to monitor  -Cardiology on board, unable to do left heart cath due to elevated creatinine, unable to start SGLT2 inhibitors as well  -Pulmonary on board plan to continue prednisone, breathing treatment, patient finished Tamiflu    #Anemia  -Status post 1 unit of blood   -FOBT positive  -A iron saturation of 11 iron level of 24 ferritin in 200  -On ferrous gluconate      #Paroxysmal atrial fibrillation  Status post watchman in August 2023  Currently in sinus rhythm  Continue amiodarone and metoprolol     #Type 2 diabetes mellitus  Continue insulin sliding scale  Holding Lantus due to limited oral intake  Holding Januvia  Continue diabetic diet  Follow Accu-Cheks     #Hypertension  Metoprolol      #GERD  -PPI     #yperlipidemia  Atorvastatin     #Depression  Venlafaxine     #Hypothyroidism  Continue Synthroid     #DVT prophylaxis  Heparin     #Disposition  -Patient met with hospice, the patient and her family are not ready to sign with hospice right now.  -CODE STATUS changed to DNR/DNI  -Nephrology on board patient started hemodialysis yesterday    Myke Newsome MD

## 2024-03-03 NOTE — PROGRESS NOTES
"                                                       Occupational Therapy                                                                  Missed Visit            Patient Name: Daphne Morris  MRN: [id number]  Today's Date: 2/21/2024      Tx attempt this date with pt declining OT Services. ELDER provided ed on benefits of participation in OT and OOB Tasks with pt agreeing , but continuing to decline stating \"I just don't feel good.\" Nurse unavailable for follow up  "

## 2024-03-04 ENCOUNTER — APPOINTMENT (OUTPATIENT)
Dept: DIALYSIS | Facility: HOSPITAL | Age: 69
End: 2024-03-04
Payer: MEDICARE

## 2024-03-04 LAB
ALBUMIN SERPL BCP-MCNC: 2.4 G/DL (ref 3.4–5)
ANION GAP SERPL CALC-SCNC: 10 MMOL/L (ref 10–20)
BASOPHILS # BLD AUTO: 0.01 X10*3/UL (ref 0–0.1)
BASOPHILS NFR BLD AUTO: 0.1 %
BUN SERPL-MCNC: 49 MG/DL (ref 6–23)
CALCIUM SERPL-MCNC: 7.2 MG/DL (ref 8.6–10.3)
CHLORIDE SERPL-SCNC: 100 MMOL/L (ref 98–107)
CO2 SERPL-SCNC: 32 MMOL/L (ref 21–32)
CREAT SERPL-MCNC: 2.04 MG/DL (ref 0.5–1.05)
EGFRCR SERPLBLD CKD-EPI 2021: 26 ML/MIN/1.73M*2
EOSINOPHIL # BLD AUTO: 0 X10*3/UL (ref 0–0.7)
EOSINOPHIL NFR BLD AUTO: 0 %
ERYTHROCYTE [DISTWIDTH] IN BLOOD BY AUTOMATED COUNT: 13.8 % (ref 11.5–14.5)
GLUCOSE BLD MANUAL STRIP-MCNC: 266 MG/DL (ref 74–99)
GLUCOSE BLD MANUAL STRIP-MCNC: 273 MG/DL (ref 74–99)
GLUCOSE BLD MANUAL STRIP-MCNC: 282 MG/DL (ref 74–99)
GLUCOSE BLD MANUAL STRIP-MCNC: 310 MG/DL (ref 74–99)
GLUCOSE SERPL-MCNC: 291 MG/DL (ref 74–99)
HCT VFR BLD AUTO: 23.8 % (ref 36–46)
HGB BLD-MCNC: 7.2 G/DL (ref 12–16)
IMM GRANULOCYTES # BLD AUTO: 0.14 X10*3/UL (ref 0–0.7)
IMM GRANULOCYTES NFR BLD AUTO: 1.4 % (ref 0–0.9)
LYMPHOCYTES # BLD AUTO: 0.42 X10*3/UL (ref 1.2–4.8)
LYMPHOCYTES NFR BLD AUTO: 4.2 %
MAGNESIUM SERPL-MCNC: 1.94 MG/DL (ref 1.6–2.4)
MCH RBC QN AUTO: 29.5 PG (ref 26–34)
MCHC RBC AUTO-ENTMCNC: 30.3 G/DL (ref 32–36)
MCV RBC AUTO: 98 FL (ref 80–100)
MONOCYTES # BLD AUTO: 0.79 X10*3/UL (ref 0.1–1)
MONOCYTES NFR BLD AUTO: 8 %
NEUTROPHILS # BLD AUTO: 8.54 X10*3/UL (ref 1.2–7.7)
NEUTROPHILS NFR BLD AUTO: 86.3 %
NRBC BLD-RTO: 0 /100 WBCS (ref 0–0)
PHOSPHATE SERPL-MCNC: 3 MG/DL (ref 2.5–4.9)
PLATELET # BLD AUTO: 202 X10*3/UL (ref 150–450)
POTASSIUM SERPL-SCNC: 4.1 MMOL/L (ref 3.5–5.3)
RBC # BLD AUTO: 2.44 X10*6/UL (ref 4–5.2)
SODIUM SERPL-SCNC: 138 MMOL/L (ref 136–145)
WBC # BLD AUTO: 9.9 X10*3/UL (ref 4.4–11.3)

## 2024-03-04 PROCEDURE — 2500000001 HC RX 250 WO HCPCS SELF ADMINISTERED DRUGS (ALT 637 FOR MEDICARE OP): Performed by: INTERNAL MEDICINE

## 2024-03-04 PROCEDURE — 2500000002 HC RX 250 W HCPCS SELF ADMINISTERED DRUGS (ALT 637 FOR MEDICARE OP, ALT 636 FOR OP/ED): Performed by: NURSE PRACTITIONER

## 2024-03-04 PROCEDURE — 80069 RENAL FUNCTION PANEL: CPT | Performed by: INTERNAL MEDICINE

## 2024-03-04 PROCEDURE — 2500000002 HC RX 250 W HCPCS SELF ADMINISTERED DRUGS (ALT 637 FOR MEDICARE OP, ALT 636 FOR OP/ED): Performed by: INTERNAL MEDICINE

## 2024-03-04 PROCEDURE — 94640 AIRWAY INHALATION TREATMENT: CPT

## 2024-03-04 PROCEDURE — 1200000002 HC GENERAL ROOM WITH TELEMETRY DAILY

## 2024-03-04 PROCEDURE — 83735 ASSAY OF MAGNESIUM: CPT | Performed by: INTERNAL MEDICINE

## 2024-03-04 PROCEDURE — 85025 COMPLETE CBC W/AUTO DIFF WBC: CPT | Performed by: INTERNAL MEDICINE

## 2024-03-04 PROCEDURE — 2500000004 HC RX 250 GENERAL PHARMACY W/ HCPCS (ALT 636 FOR OP/ED): Performed by: NURSE PRACTITIONER

## 2024-03-04 PROCEDURE — 87081 CULTURE SCREEN ONLY: CPT | Mod: GEALAB | Performed by: STUDENT IN AN ORGANIZED HEALTH CARE EDUCATION/TRAINING PROGRAM

## 2024-03-04 PROCEDURE — 2500000004 HC RX 250 GENERAL PHARMACY W/ HCPCS (ALT 636 FOR OP/ED): Performed by: INTERNAL MEDICINE

## 2024-03-04 PROCEDURE — 8010000001 HC DIALYSIS - HEMODIALYSIS PER DAY

## 2024-03-04 PROCEDURE — 99233 SBSQ HOSP IP/OBS HIGH 50: CPT | Performed by: STUDENT IN AN ORGANIZED HEALTH CARE EDUCATION/TRAINING PROGRAM

## 2024-03-04 PROCEDURE — 82947 ASSAY GLUCOSE BLOOD QUANT: CPT

## 2024-03-04 RX ORDER — HEPARIN SODIUM,PORCINE/PF 10 UNIT/ML
50 SYRINGE (ML) INTRAVENOUS EVERY 12 HOURS
Status: DISCONTINUED | OUTPATIENT
Start: 2024-03-04 | End: 2024-03-05 | Stop reason: HOSPADM

## 2024-03-04 RX ORDER — LABETALOL HYDROCHLORIDE 5 MG/ML
20 INJECTION, SOLUTION INTRAVENOUS ONCE
Status: COMPLETED | OUTPATIENT
Start: 2024-03-04 | End: 2024-03-04

## 2024-03-04 RX ORDER — HEPARIN SODIUM,PORCINE/PF 10 UNIT/ML
50 SYRINGE (ML) INTRAVENOUS AS NEEDED
Status: DISCONTINUED | OUTPATIENT
Start: 2024-03-04 | End: 2024-03-05 | Stop reason: HOSPADM

## 2024-03-04 RX ADMIN — ATORVASTATIN CALCIUM 40 MG: 40 TABLET, FILM COATED ORAL at 13:27

## 2024-03-04 RX ADMIN — IPRATROPIUM BROMIDE AND ALBUTEROL SULFATE 3 ML: 2.5; .5 SOLUTION RESPIRATORY (INHALATION) at 13:51

## 2024-03-04 RX ADMIN — Medication 5 MG: at 21:11

## 2024-03-04 RX ADMIN — IPRATROPIUM BROMIDE AND ALBUTEROL SULFATE 3 ML: 2.5; .5 SOLUTION RESPIRATORY (INHALATION) at 20:38

## 2024-03-04 RX ADMIN — PANTOPRAZOLE SODIUM 40 MG: 40 TABLET, DELAYED RELEASE ORAL at 21:12

## 2024-03-04 RX ADMIN — HEPARIN SODIUM 1700 UNITS: 1000 INJECTION INTRAVENOUS; SUBCUTANEOUS at 12:42

## 2024-03-04 RX ADMIN — IPRATROPIUM BROMIDE AND ALBUTEROL SULFATE 3 ML: 2.5; .5 SOLUTION RESPIRATORY (INHALATION) at 07:39

## 2024-03-04 RX ADMIN — PREDNISONE 40 MG: 20 TABLET ORAL at 13:26

## 2024-03-04 RX ADMIN — Medication: at 21:23

## 2024-03-04 RX ADMIN — AMIODARONE HYDROCHLORIDE 200 MG: 200 TABLET ORAL at 13:27

## 2024-03-04 RX ADMIN — INSULIN LISPRO 12 UNITS: 100 INJECTION, SOLUTION INTRAVENOUS; SUBCUTANEOUS at 17:02

## 2024-03-04 RX ADMIN — Medication: at 13:24

## 2024-03-04 RX ADMIN — HEPARIN SODIUM 1800 UNITS: 1000 INJECTION INTRAVENOUS; SUBCUTANEOUS at 12:43

## 2024-03-04 RX ADMIN — FERROUS GLUCONATE 324 MG: 324 TABLET ORAL at 13:27

## 2024-03-04 RX ADMIN — HEPARIN SODIUM 1700 UNITS: 1000 INJECTION INTRAVENOUS; SUBCUTANEOUS at 16:05

## 2024-03-04 RX ADMIN — LABETALOL HYDROCHLORIDE 20 MG: 5 INJECTION, SOLUTION INTRAVENOUS at 06:10

## 2024-03-04 RX ADMIN — LEVOTHYROXINE SODIUM 200 MCG: 100 TABLET ORAL at 05:28

## 2024-03-04 RX ADMIN — PANTOPRAZOLE SODIUM 40 MG: 40 TABLET, DELAYED RELEASE ORAL at 13:26

## 2024-03-04 RX ADMIN — ASPIRIN 81 MG: 81 TABLET, COATED ORAL at 13:27

## 2024-03-04 RX ADMIN — VENLAFAXINE HYDROCHLORIDE 75 MG: 75 CAPSULE, EXTENDED RELEASE ORAL at 13:26

## 2024-03-04 RX ADMIN — INSULIN GLARGINE 14 UNITS: 100 INJECTION, SOLUTION SUBCUTANEOUS at 21:12

## 2024-03-04 RX ADMIN — HEPARIN SODIUM 1800 UNITS: 1000 INJECTION INTRAVENOUS; SUBCUTANEOUS at 16:05

## 2024-03-04 RX ADMIN — INSULIN LISPRO 9 UNITS: 100 INJECTION, SOLUTION INTRAVENOUS; SUBCUTANEOUS at 15:31

## 2024-03-04 ASSESSMENT — COGNITIVE AND FUNCTIONAL STATUS - GENERAL
MOBILITY SCORE: 8
DRESSING REGULAR UPPER BODY CLOTHING: A LOT
DAILY ACTIVITIY SCORE: 12
WALKING IN HOSPITAL ROOM: TOTAL
MOVING FROM LYING ON BACK TO SITTING ON SIDE OF FLAT BED WITH BEDRAILS: A LOT
EATING MEALS: A LITTLE
HELP NEEDED FOR BATHING: A LOT
WALKING IN HOSPITAL ROOM: TOTAL
PERSONAL GROOMING: A LOT
TURNING FROM BACK TO SIDE WHILE IN FLAT BAD: A LOT
TURNING FROM BACK TO SIDE WHILE IN FLAT BAD: A LOT
EATING MEALS: A LITTLE
MOVING TO AND FROM BED TO CHAIR: TOTAL
DRESSING REGULAR UPPER BODY CLOTHING: A LOT
DRESSING REGULAR LOWER BODY CLOTHING: A LOT
MOBILITY SCORE: 8
MOVING TO AND FROM BED TO CHAIR: TOTAL
TOILETING: TOTAL
DRESSING REGULAR LOWER BODY CLOTHING: A LOT
CLIMB 3 TO 5 STEPS WITH RAILING: TOTAL
DAILY ACTIVITIY SCORE: 12
HELP NEEDED FOR BATHING: A LOT
TOILETING: TOTAL
MOVING FROM LYING ON BACK TO SITTING ON SIDE OF FLAT BED WITH BEDRAILS: A LOT
CLIMB 3 TO 5 STEPS WITH RAILING: TOTAL
STANDING UP FROM CHAIR USING ARMS: TOTAL
PERSONAL GROOMING: A LOT
STANDING UP FROM CHAIR USING ARMS: TOTAL

## 2024-03-04 ASSESSMENT — PAIN SCALES - GENERAL
PAINLEVEL_OUTOF10: 0 - NO PAIN
PAINLEVEL_OUTOF10: 0 - NO PAIN

## 2024-03-04 ASSESSMENT — PAIN - FUNCTIONAL ASSESSMENT: PAIN_FUNCTIONAL_ASSESSMENT: 0-10

## 2024-03-04 NOTE — PROGRESS NOTES
Report to Receiving RN:    Report To: Rayna Patel RN  Time Report Called: 0651  Hand-Off Communication: No issues with dialysis; 3L of fluid removed with dialysis; Dressing D&I; Ending BP is 149/68; Pt denies any further needs or complaints at this time.  Complications During Treatment: No  Ultrafiltration Treatment: No  Medications Administered During Dialysis: No  Blood Products Administered During Dialysis: No  Labs Sent During Dialysis: No  Heparin Drip Rate Changes: No    Electronic Signatures:  Shani Jj OCDT   Last Updated: 12:50 PM by SHANI JJ

## 2024-03-04 NOTE — DISCHARGE INSTR - APPOINTMENTS
Pt will have dialysis TTS at [facility]) with arrival time of 6:40 for a 6:55 start time.  On first visit to [facility] please have pt bring her ID and insurance card.

## 2024-03-04 NOTE — CARE PLAN
The clinical goals for the shift include Patient will remain HDS throughout shift      Problem: Respiratory  Goal: Clear secretions with interventions this shift  Outcome: Progressing     Problem: Skin  Goal: Decreased wound size/increased tissue granulation at next dressing change  Outcome: Progressing     Problem: Pain  Goal: My pain/discomfort is manageable  Outcome: Progressing     Problem: Safety  Goal: Patient will be injury free during hospitalization  Outcome: Progressing     Problem: Daily Care  Goal: Daily care needs are met  Outcome: Progressing     Problem: Pain - Adult  Goal: Verbalizes/displays adequate comfort level or baseline comfort level  Outcome: Progressing     Problem: Heart Failure  Goal: Improved gas exchange this shift  Outcome: Progressing

## 2024-03-04 NOTE — PROGRESS NOTES
"Daphne Morris is a 68 y.o. female on day 14 of admission presenting with Heart failure (CMS/HCC).    Subjective   Pt is feeling ok. She is still requiring 2L NC. She thinks her leg swelling has improved.        Objective     Physical Exam  Vitals and nursing note reviewed.   Constitutional:       General: She is not in acute distress.     Appearance: Normal appearance. She is not ill-appearing or toxic-appearing.   HENT:      Head: Normocephalic and atraumatic.      Mouth/Throat:      Mouth: Mucous membranes are moist.   Eyes:      Extraocular Movements: Extraocular movements intact.      Conjunctiva/sclera: Conjunctivae normal.      Pupils: Pupils are equal, round, and reactive to light.   Cardiovascular:      Rate and Rhythm: Normal rate and regular rhythm.      Heart sounds: No murmur heard.     No gallop.   Pulmonary:      Effort: Pulmonary effort is normal. No respiratory distress.      Breath sounds: Normal breath sounds. No wheezing, rhonchi or rales.   Abdominal:      General: Abdomen is flat. Bowel sounds are normal. There is no distension.      Palpations: Abdomen is soft. There is no mass.      Tenderness: There is no abdominal tenderness.   Musculoskeletal:         General: Swelling present. No tenderness. Normal range of motion.      Cervical back: Normal range of motion and neck supple.      Right lower leg: Edema present.      Left lower leg: Edema present.   Skin:     General: Skin is warm and dry.   Neurological:      General: No focal deficit present.      Mental Status: She is alert and oriented to person, place, and time. Mental status is at baseline.   Psychiatric:         Mood and Affect: Mood normal.         Behavior: Behavior normal.         Thought Content: Thought content normal.         Judgment: Judgment normal.       Last Recorded Vitals:  /64   Pulse 76   Temp 36.4 °C (97.5 °F) (Temporal)   Resp 19   Ht 1.676 m (5' 6\")   Wt 96 kg (211 lb 10.3 oz)   SpO2 98%   BMI 34.16 " kg/m²      Scheduled medications:  amiodarone, 200 mg, oral, Daily with breakfast  ammonium lactate, , Topical, BID  aspirin, 81 mg, oral, Daily  atorvastatin, 40 mg, oral, Daily  [Held by provider] clopidogrel, 75 mg, oral, Daily  ferrous gluconate, 324 mg, oral, Daily with breakfast  [Held by provider] heparin (porcine), 5,000 Units, subcutaneous, q8h  heparin, 1,700 Units, hemodialysis, After Dialysis  heparin, 1,800 Units, hemodialysis, After Dialysis  insulin glargine, 14 Units, subcutaneous, q24h  insulin lispro, 0-15 Units, subcutaneous, TID with meals  ipratropium-albuteroL, 3 mL, nebulization, TID  levothyroxine, 200 mcg, oral, Daily before breakfast  lidocaine, 5 mL, infiltration, Once  melatonin, 5 mg, oral, Daily  pantoprazole, 40 mg, oral, BID  polyethylene glycol, 17 g, oral, Daily  predniSONE, 40 mg, oral, Daily  venlafaxine XR, 75 mg, oral, Daily      Continuous medications:  [Held by provider] furosemide, 10 mg/hr, Last Rate: 10 mg/hr (02/22/24 1927)      PRN medications:  PRN medications: acetaminophen **OR** acetaminophen **OR** acetaminophen, acetaminophen **OR** acetaminophen **OR** acetaminophen, albuterol, dextrose 10 % in water (D10W), dextrose, diphenhydrAMINE, glucagon, ipratropium-albuteroL, ondansetron **OR** ondansetron, oxygen, oxygen     Relevant Results:  Results for orders placed or performed during the hospital encounter of 02/19/24 (from the past 24 hour(s))   CBC and Auto Differential   Result Value Ref Range    WBC 9.7 4.4 - 11.3 x10*3/uL    nRBC 0.0 0.0 - 0.0 /100 WBCs    RBC 2.63 (L) 4.00 - 5.20 x10*6/uL    Hemoglobin 7.8 (L) 12.0 - 16.0 g/dL    Hematocrit 25.6 (L) 36.0 - 46.0 %    MCV 97 80 - 100 fL    MCH 29.7 26.0 - 34.0 pg    MCHC 30.5 (L) 32.0 - 36.0 g/dL    RDW 13.6 11.5 - 14.5 %    Platelets 192 150 - 450 x10*3/uL    Neutrophils % 96.7 40.0 - 80.0 %    Immature Granulocytes %, Automated 0.7 0.0 - 0.9 %    Lymphocytes % 1.2 13.0 - 44.0 %    Monocytes % 1.3 2.0 - 10.0 %     Eosinophils % 0.0 0.0 - 6.0 %    Basophils % 0.1 0.0 - 2.0 %    Neutrophils Absolute 9.33 (H) 1.20 - 7.70 x10*3/uL    Immature Granulocytes Absolute, Automated 0.07 0.00 - 0.70 x10*3/uL    Lymphocytes Absolute 0.12 (L) 1.20 - 4.80 x10*3/uL    Monocytes Absolute 0.13 0.10 - 1.00 x10*3/uL    Eosinophils Absolute 0.00 0.00 - 0.70 x10*3/uL    Basophils Absolute 0.01 0.00 - 0.10 x10*3/uL   POCT GLUCOSE   Result Value Ref Range    POCT Glucose 245 (H) 74 - 99 mg/dL   POCT GLUCOSE   Result Value Ref Range    POCT Glucose 313 (H) 74 - 99 mg/dL   Renal Function Panel   Result Value Ref Range    Glucose 291 (H) 74 - 99 mg/dL    Sodium 138 136 - 145 mmol/L    Potassium 4.1 3.5 - 5.3 mmol/L    Chloride 100 98 - 107 mmol/L    Bicarbonate 32 21 - 32 mmol/L    Anion Gap 10 10 - 20 mmol/L    Urea Nitrogen 49 (H) 6 - 23 mg/dL    Creatinine 2.04 (H) 0.50 - 1.05 mg/dL    eGFR 26 (L) >60 mL/min/1.73m*2    Calcium 7.2 (L) 8.6 - 10.3 mg/dL    Phosphorus 3.0 2.5 - 4.9 mg/dL    Albumin 2.4 (L) 3.4 - 5.0 g/dL   Magnesium   Result Value Ref Range    Magnesium 1.94 1.60 - 2.40 mg/dL   CBC and Auto Differential   Result Value Ref Range    WBC 9.9 4.4 - 11.3 x10*3/uL    nRBC 0.0 0.0 - 0.0 /100 WBCs    RBC 2.44 (L) 4.00 - 5.20 x10*6/uL    Hemoglobin 7.2 (L) 12.0 - 16.0 g/dL    Hematocrit 23.8 (L) 36.0 - 46.0 %    MCV 98 80 - 100 fL    MCH 29.5 26.0 - 34.0 pg    MCHC 30.3 (L) 32.0 - 36.0 g/dL    RDW 13.8 11.5 - 14.5 %    Platelets 202 150 - 450 x10*3/uL    Neutrophils % 86.3 40.0 - 80.0 %    Immature Granulocytes %, Automated 1.4 (H) 0.0 - 0.9 %    Lymphocytes % 4.2 13.0 - 44.0 %    Monocytes % 8.0 2.0 - 10.0 %    Eosinophils % 0.0 0.0 - 6.0 %    Basophils % 0.1 0.0 - 2.0 %    Neutrophils Absolute 8.54 (H) 1.20 - 7.70 x10*3/uL    Immature Granulocytes Absolute, Automated 0.14 0.00 - 0.70 x10*3/uL    Lymphocytes Absolute 0.42 (L) 1.20 - 4.80 x10*3/uL    Monocytes Absolute 0.79 0.10 - 1.00 x10*3/uL    Eosinophils Absolute 0.00 0.00 - 0.70 x10*3/uL     Basophils Absolute 0.01 0.00 - 0.10 x10*3/uL   POCT GLUCOSE   Result Value Ref Range    POCT Glucose 266 (H) 74 - 99 mg/dL   POCT GLUCOSE   Result Value Ref Range    POCT Glucose 273 (H) 74 - 99 mg/dL       No results found.          Assessment/Plan   Principal Problem:    Heart failure (CMS/[facility])  Active Problems:    Stage 3b chronic kidney disease (CKD) (CMS/[facility])    Daphne Morris is a 68 y.o. female presenting with past medical history of heart failure, atrial fibrillation, Hypertension, type 2 DM transfer from [facility] ER due to acute hypoxic respiratory failure secondary to pneumonia, COPD exacerbation and acute diastolic heart failure.    Acute hypoxic respiratory failure 2/2 HFpEF exacerbation, influenza B  - supplemental O2, currently on 2LNC  - cardio recs appreciated  - pulm recs appreciated  - dialysis per nephro  - finished tamiflu  - prednisone  - duoneb    HFpEF exacerbation  - cardio recs appreciated  - dialysis per nephro    Now ESRD on HD  - nephro following  - hemodialysis placed on 3/1 by surg  - dialysis per nephro    Normocytic Anemia  - Status post 1 unit of blood   - ferrous gluconate      Paroxysmal atrial fibrillation  - Status post watchman in August 2023  - amiodarone  - metoprolol     Type 2 diabetes mellitus  - lantus  - holding januvia  - ISS    Hypertension  - Metoprolol      GERD  - PPI     Hyperlipidemia  - Atorvastatin     Depression  - Venlafaxine     Hypothyroidism  - Synthroid     DVT prophylaxis: SCD  Code status: DNR/DNI  - Patient met with hospice, the patient and her family are not ready to sign with hospice right now.  Dispo: monitor clinically, wean O2  Can start dispo planning         Maria E Bustamante MD  Hospitalist

## 2024-03-04 NOTE — PROGRESS NOTES
03/04/24 1537   Discharge Planning   Living Arrangements Other (Comment)   Support Systems Friends/neighbors   Assistance Needed Needs new dialysis treatments and SNF   Type of Residence Private residence   Number of Stairs Within Residence 4   Do you have animals or pets at home? Yes   Type of Animals or Pets 1 cat inside and 1 cat outside   Who is requesting discharge planning? Provider   Home or Post Acute Services Post acute facilities (Rehab/SNF/etc)   Type of Post Acute Facility Services Rehab   Type of Home Care Services Other (Comment)   Does the patient need discharge transport arranged? Yes   RoundTrip coordination needed? Yes     Pt will discharge to [facility] with new dialysis TTS at [facility].  Planning first dialysis treatment at [facility] to be Thursday 3/7 with arrival time of 6:40 am.    3/5/2024 - Pt will transfer to [facility] today at 8:30 pm via ambulance.  Dialysis flow sheets and discharge summary faxed to [facility].

## 2024-03-04 NOTE — PROGRESS NOTES
"Physical Therapy                 Therapy Communication Note    Patient Name: Daphne Morris  MRN: [id number]  Today's Date: 3/4/2024     Discipline: Physical Therapy    Missed Visit Reason: Missed Visit Reason: Patient refused (Patient reports being \"too tired\" following dialysis and declined PT intervention at this time despite encouragement.)    Missed Time: Attempt    "

## 2024-03-04 NOTE — PROGRESS NOTES
Report from Sending RN:    Report From: Rayna Patel RN  Recent Surgery of Procedure: No  Baseline Level of Consciousness (LOC): A&O X4  Oxygen Use: Yes  Type: 3L NC  Diabetic: No  Last BP Med Given Day of Dialysis: See EMR  Last Pain Med Given: See EMR  Lab Tests to be Obtained with Dialysis: No  Blood Transfusion to be Given During Dialysis: No  Available IV Access: Yes  Medications to be Administered During Dialysis: No  Continuous IV Infusion Running: Yes, NS  Restraints on Currently or in the Last 24 Hours: No  Hand-Off Communication: Pt okay for dialysis, High BP.

## 2024-03-04 NOTE — PROGRESS NOTES
We are following for ANDREA on CKD in setting of acute decompensated heart failure.  Now on dialysis    Seen on dialysis.  I reviewed treatment orders and confirmed with the dialysis staff.  Close infiltration 3 L and she is tolerating.  Has a right chest wall tunneled dialysis catheter that is working without issues.  Remains severely volume overloaded with +3 pitting edema    Discussed with care management.  She has been set up accepted at [facility] on TTS schedule.     ANDREA now ESRD that is stable  Acute decompensated heart failure that has not responded to standard treatment and is now dialysis dependent    Overall prognosis is guarded.  Plan for hemodialysis tomorrow and continue thrice weekly.

## 2024-03-05 ENCOUNTER — NURSING HOME VISIT (OUTPATIENT)
Dept: POST ACUTE CARE | Facility: EXTERNAL LOCATION | Age: 69
End: 2024-03-05

## 2024-03-05 ENCOUNTER — APPOINTMENT (OUTPATIENT)
Dept: DIALYSIS | Facility: HOSPITAL | Age: 69
End: 2024-03-05
Payer: MEDICARE

## 2024-03-05 VITALS
WEIGHT: 207.45 LBS | RESPIRATION RATE: 18 BRPM | HEART RATE: 86 BPM | HEIGHT: 66 IN | SYSTOLIC BLOOD PRESSURE: 168 MMHG | DIASTOLIC BLOOD PRESSURE: 71 MMHG | TEMPERATURE: 97.5 F | BODY MASS INDEX: 33.34 KG/M2 | OXYGEN SATURATION: 98 %

## 2024-03-05 DIAGNOSIS — R53.1 WEAKNESS: ICD-10-CM

## 2024-03-05 DIAGNOSIS — I50.9 CONGESTIVE HEART FAILURE, UNSPECIFIED HF CHRONICITY, UNSPECIFIED HEART FAILURE TYPE (MULTI): Primary | ICD-10-CM

## 2024-03-05 DIAGNOSIS — E03.9 HYPOTHYROIDISM, UNSPECIFIED TYPE: ICD-10-CM

## 2024-03-05 DIAGNOSIS — K21.9 GASTROESOPHAGEAL REFLUX DISEASE WITHOUT ESOPHAGITIS: ICD-10-CM

## 2024-03-05 DIAGNOSIS — I48.91 ATRIAL FIBRILLATION, UNSPECIFIED TYPE (MULTI): ICD-10-CM

## 2024-03-05 DIAGNOSIS — I10 HYPERTENSION, UNSPECIFIED TYPE: ICD-10-CM

## 2024-03-05 DIAGNOSIS — N18.6 ESRD (END STAGE RENAL DISEASE) (MULTI): ICD-10-CM

## 2024-03-05 DIAGNOSIS — E78.5 HYPERLIPIDEMIA, UNSPECIFIED HYPERLIPIDEMIA TYPE: ICD-10-CM

## 2024-03-05 DIAGNOSIS — J96.90 RESPIRATORY FAILURE, UNSPECIFIED CHRONICITY, UNSPECIFIED WHETHER WITH HYPOXIA OR HYPERCAPNIA (MULTI): ICD-10-CM

## 2024-03-05 DIAGNOSIS — E11.9 TYPE 2 DIABETES MELLITUS WITHOUT COMPLICATION, WITH LONG-TERM CURRENT USE OF INSULIN (MULTI): ICD-10-CM

## 2024-03-05 DIAGNOSIS — F32.A DEPRESSION, UNSPECIFIED DEPRESSION TYPE: ICD-10-CM

## 2024-03-05 DIAGNOSIS — Z79.4 TYPE 2 DIABETES MELLITUS WITHOUT COMPLICATION, WITH LONG-TERM CURRENT USE OF INSULIN (MULTI): ICD-10-CM

## 2024-03-05 DIAGNOSIS — D64.9 ANEMIA, UNSPECIFIED TYPE: ICD-10-CM

## 2024-03-05 LAB
ALBUMIN SERPL BCP-MCNC: 2.5 G/DL (ref 3.4–5)
ALP SERPL-CCNC: 69 U/L (ref 33–136)
ALT SERPL W P-5'-P-CCNC: 8 U/L (ref 7–45)
ANION GAP SERPL CALC-SCNC: 12 MMOL/L (ref 10–20)
AST SERPL W P-5'-P-CCNC: 12 U/L (ref 9–39)
BASOPHILS # BLD AUTO: 0 X10*3/UL (ref 0–0.1)
BASOPHILS NFR BLD AUTO: 0 %
BILIRUB SERPL-MCNC: 0.3 MG/DL (ref 0–1.2)
BUN SERPL-MCNC: 30 MG/DL (ref 6–23)
CALCIUM SERPL-MCNC: 7.1 MG/DL (ref 8.6–10.3)
CHLORIDE SERPL-SCNC: 100 MMOL/L (ref 98–107)
CO2 SERPL-SCNC: 29 MMOL/L (ref 21–32)
CREAT SERPL-MCNC: 1.59 MG/DL (ref 0.5–1.05)
EGFRCR SERPLBLD CKD-EPI 2021: 35 ML/MIN/1.73M*2
EOSINOPHIL # BLD AUTO: 0.01 X10*3/UL (ref 0–0.7)
EOSINOPHIL NFR BLD AUTO: 0.1 %
ERYTHROCYTE [DISTWIDTH] IN BLOOD BY AUTOMATED COUNT: 13.6 % (ref 11.5–14.5)
GLUCOSE BLD MANUAL STRIP-MCNC: 232 MG/DL (ref 74–99)
GLUCOSE BLD MANUAL STRIP-MCNC: 70 MG/DL (ref 74–99)
GLUCOSE SERPL-MCNC: 255 MG/DL (ref 74–99)
HCT VFR BLD AUTO: 23.7 % (ref 36–46)
HGB BLD-MCNC: 7.4 G/DL (ref 12–16)
IMM GRANULOCYTES # BLD AUTO: 0.14 X10*3/UL (ref 0–0.7)
IMM GRANULOCYTES NFR BLD AUTO: 1.2 % (ref 0–0.9)
LYMPHOCYTES # BLD AUTO: 0.43 X10*3/UL (ref 1.2–4.8)
LYMPHOCYTES NFR BLD AUTO: 3.7 %
MCH RBC QN AUTO: 30.5 PG (ref 26–34)
MCHC RBC AUTO-ENTMCNC: 31.2 G/DL (ref 32–36)
MCV RBC AUTO: 98 FL (ref 80–100)
MONOCYTES # BLD AUTO: 0.74 X10*3/UL (ref 0.1–1)
MONOCYTES NFR BLD AUTO: 6.5 %
NEUTROPHILS # BLD AUTO: 10.15 X10*3/UL (ref 1.2–7.7)
NEUTROPHILS NFR BLD AUTO: 88.5 %
NRBC BLD-RTO: 0 /100 WBCS (ref 0–0)
PLATELET # BLD AUTO: 178 X10*3/UL (ref 150–450)
POTASSIUM SERPL-SCNC: 3.9 MMOL/L (ref 3.5–5.3)
PROT SERPL-MCNC: 5 G/DL (ref 6.4–8.2)
RBC # BLD AUTO: 2.43 X10*6/UL (ref 4–5.2)
SARS-COV-2 RNA RESP QL NAA+PROBE: NOT DETECTED
SODIUM SERPL-SCNC: 137 MMOL/L (ref 136–145)
WBC # BLD AUTO: 11.5 X10*3/UL (ref 4.4–11.3)

## 2024-03-05 PROCEDURE — 2500000002 HC RX 250 W HCPCS SELF ADMINISTERED DRUGS (ALT 637 FOR MEDICARE OP, ALT 636 FOR OP/ED): Performed by: INTERNAL MEDICINE

## 2024-03-05 PROCEDURE — 97530 THERAPEUTIC ACTIVITIES: CPT | Mod: GP,CQ

## 2024-03-05 PROCEDURE — 85025 COMPLETE CBC W/AUTO DIFF WBC: CPT | Performed by: INTERNAL MEDICINE

## 2024-03-05 PROCEDURE — 87635 SARS-COV-2 COVID-19 AMP PRB: CPT | Performed by: STUDENT IN AN ORGANIZED HEALTH CARE EDUCATION/TRAINING PROGRAM

## 2024-03-05 PROCEDURE — 97535 SELF CARE MNGMENT TRAINING: CPT | Mod: GO,CO

## 2024-03-05 PROCEDURE — 2500000001 HC RX 250 WO HCPCS SELF ADMINISTERED DRUGS (ALT 637 FOR MEDICARE OP): Performed by: INTERNAL MEDICINE

## 2024-03-05 PROCEDURE — 2500000004 HC RX 250 GENERAL PHARMACY W/ HCPCS (ALT 636 FOR OP/ED): Performed by: INTERNAL MEDICINE

## 2024-03-05 PROCEDURE — 8010000001 HC DIALYSIS - HEMODIALYSIS PER DAY

## 2024-03-05 PROCEDURE — 99239 HOSP IP/OBS DSCHRG MGMT >30: CPT | Performed by: STUDENT IN AN ORGANIZED HEALTH CARE EDUCATION/TRAINING PROGRAM

## 2024-03-05 PROCEDURE — 82947 ASSAY GLUCOSE BLOOD QUANT: CPT

## 2024-03-05 PROCEDURE — 94760 N-INVAS EAR/PLS OXIMETRY 1: CPT

## 2024-03-05 PROCEDURE — 99305 1ST NF CARE MODERATE MDM 35: CPT | Performed by: INTERNAL MEDICINE

## 2024-03-05 PROCEDURE — 84075 ASSAY ALKALINE PHOSPHATASE: CPT | Performed by: STUDENT IN AN ORGANIZED HEALTH CARE EDUCATION/TRAINING PROGRAM

## 2024-03-05 RX ORDER — PREDNISONE 20 MG/1
40 TABLET ORAL DAILY
Qty: 6 TABLET | Refills: 0
Start: 2024-03-06 | End: 2024-03-09

## 2024-03-05 RX ADMIN — FERROUS GLUCONATE 324 MG: 324 TABLET ORAL at 14:54

## 2024-03-05 RX ADMIN — HEPARIN SODIUM 1800 UNITS: 1000 INJECTION INTRAVENOUS; SUBCUTANEOUS at 13:55

## 2024-03-05 RX ADMIN — HEPARIN SODIUM 1700 UNITS: 1000 INJECTION INTRAVENOUS; SUBCUTANEOUS at 13:54

## 2024-03-05 RX ADMIN — ATORVASTATIN CALCIUM 40 MG: 40 TABLET, FILM COATED ORAL at 14:52

## 2024-03-05 RX ADMIN — AMIODARONE HYDROCHLORIDE 200 MG: 200 TABLET ORAL at 14:53

## 2024-03-05 RX ADMIN — ACETAMINOPHEN 650 MG: 325 TABLET ORAL at 04:04

## 2024-03-05 RX ADMIN — VENLAFAXINE HYDROCHLORIDE 75 MG: 75 CAPSULE, EXTENDED RELEASE ORAL at 14:53

## 2024-03-05 RX ADMIN — PANTOPRAZOLE SODIUM 40 MG: 40 TABLET, DELAYED RELEASE ORAL at 14:53

## 2024-03-05 RX ADMIN — PREDNISONE 40 MG: 20 TABLET ORAL at 14:53

## 2024-03-05 RX ADMIN — ASPIRIN 81 MG: 81 TABLET, COATED ORAL at 14:53

## 2024-03-05 RX ADMIN — LEVOTHYROXINE SODIUM 200 MCG: 100 TABLET ORAL at 05:26

## 2024-03-05 RX ADMIN — INSULIN LISPRO 9 UNITS: 100 INJECTION, SOLUTION INTRAVENOUS; SUBCUTANEOUS at 08:10

## 2024-03-05 ASSESSMENT — PAIN SCALES - GENERAL
PAINLEVEL_OUTOF10: 0 - NO PAIN
PAINLEVEL_OUTOF10: 0 - NO PAIN

## 2024-03-05 ASSESSMENT — COGNITIVE AND FUNCTIONAL STATUS - GENERAL
EATING MEALS: A LITTLE
DAILY ACTIVITIY SCORE: 12
DRESSING REGULAR UPPER BODY CLOTHING: A LOT
TURNING FROM BACK TO SIDE WHILE IN FLAT BAD: A LOT
TOILETING: TOTAL
STANDING UP FROM CHAIR USING ARMS: TOTAL
MOVING FROM LYING ON BACK TO SITTING ON SIDE OF FLAT BED WITH BEDRAILS: A LOT
MOVING TO AND FROM BED TO CHAIR: TOTAL
CLIMB 3 TO 5 STEPS WITH RAILING: TOTAL
MOBILITY SCORE: 8
HELP NEEDED FOR BATHING: A LOT
PERSONAL GROOMING: A LOT
DRESSING REGULAR LOWER BODY CLOTHING: A LOT
WALKING IN HOSPITAL ROOM: TOTAL

## 2024-03-05 ASSESSMENT — ACTIVITIES OF DAILY LIVING (ADL): HOME_MANAGEMENT_TIME_ENTRY: 9

## 2024-03-05 ASSESSMENT — PAIN - FUNCTIONAL ASSESSMENT
PAIN_FUNCTIONAL_ASSESSMENT: 0-10
PAIN_FUNCTIONAL_ASSESSMENT: 0-10

## 2024-03-05 NOTE — PROGRESS NOTES
Occupational Therapy    Occupational Therapy Treatment    Name: Daphne Morris  MRN: [id number]  : 1955  Date: 24  Time Calculation  Start Time: 842  Stop Time: 901  Time Calculation (min): 19 min    Assessment:  OT Assessment: Pt continues to present with decreased ADL performance, decreased functional mobility, decreased endurance. Continued skilled OT recommended to maximize pt safety and independence.  Prognosis: Fair  Barriers to Discharge: None  Evaluation/Treatment Tolerance: Patient limited by fatigue (and weakness)  Medical Staff Made Aware: Yes  End of Session Communication: Bedside nurse  End of Session Patient Position: Bed, 3 rail up, Alarm on (Patient sitting at EOB with breakfast tray, eating upon departure from [facility] with active bed alarm.)  Plan:  Treatment Interventions: ADL retraining, Functional transfer training  OT Frequency: 3 times per week  OT Discharge Recommendations: Moderate intensity level of continued care  Equipment Recommended upon Discharge:  (TBD in discharge setting.)  OT Recommended Transfer Status: Assist of 2  OT - OK to Discharge: Yes (In accord with OT POC.)    Subjective   Previous Visit Info:  OT Last Visit  OT Received On: 24  General:  General  Reason for Referral: Impaired functional mobility; (+) flu  Referred By: Myke Newsome MD  Past Medical History Relevant to Rehab: heart failure with preserved ejection fraction, chronic respiratory failure secondary to CHF/COPD/pleural effusion 2 L of oxygen at baseline, history of A-fib, hypertension, prior CVA anemia, CKD stage IV, DM type II, anemia, hypothyroidism, and pericardial effusion who presented to the ED from her nursing facility with concern of dyspnea and hypoxia, patient was recently admitted to [facility] on -02/15 for AHRF, COPDae, ANDREA/cardiorenal syndrome she received Lasix and underwent right thoracentesis as well as diagnostic right heart cath which showed mild  pulmonary hypertension  Family/Caregiver Present: No  Co-Treatment: PT  Co-Treatment Reason: To maximize pt safety and outcomes and accomodate discharge planning.  Prior to Session Communication: Care Coordinator, Bedside nurse (Care coordinator requesting treatment this am prior to dialysis for use in precert. Nurse reports patient now off isolation and appropriate for treatment.)  Patient Position Received: Bed, 3 rail up, Alarm on  Preferred Learning Style: verbal, visual  General Comment: Patient agreeable to limited OT treatment with encouragement. Awaiting dialyisis immediately following treatment.  Precautions:  Medical Precautions: Fall precautions     Pain Assessment:  Pain Assessment  Pain Assessment: 0-10  Pain Score: 0 - No pain     Objective   Activities of Daily Living: LE Dressing  LE Dressing:  (Pt able to maintain sitting balance for duration of donning socks with preloaded sock aide. CGA required for balance compensations.)  LE Dressing Adaptive Equipment:  (Pt used preloaded sock aide with assist to pull ropes and don socks bilaterally (Mod assist).)  Sock Level of Assistance: Moderate assistance, Setup, Close supervision, CGA for seated components for balance. Fair + balance in static components and fair minus with dynamic components during seated activities at EOB.     Bed Mobility/Transfers: Bed Mobility  Bed Mobility: Yes  Bed Mobility 1  Bed Mobility 1: Supine to sitting  Level of Assistance 1: Moderate assistance (of 1)  Bed Mobility Comments 1: Significant time on task d/t fatigue with max verbal cues for technique needed.    Transfers  Transfer: Yes  Transfer 1  Transfer From 1: Bed to  Transfer to 1: Stand  Technique 1: Sit to stand, Stand to sit  Transfer Device 1: Walker  Transfer Level of Assistance 1: Moderate assistance, +2  Trials/Comments 1: Pt tolerated very short duration standing in a very flexed forward posture with increased onset of anxiety. Unable to repeat task.     Outcome  Measures:  [facility] Daily Activity  Putting on and taking off regular lower body clothing: A lot  Bathing (including washing, rinsing, drying): A lot  Putting on and taking off regular upper body clothing: A lot  Toileting, which includes using toilet, bedpan or urinal: Total  Taking care of personal grooming such as brushing teeth: A lot  Eating Meals: A little  Daily Activity - Total Score: 12    Education Documentation  Body Mechanics, taught by DAYO England at 3/5/2024  9:28 AM.  Learner: Patient  Readiness: Acceptance  Method: Demonstration  Response: Verbalizes Understanding, Demonstrated Understanding, Needs Reinforcement  Comment: Difficulty with insight and recall of prior instruction.    Precautions, taught by DAYO England at 3/5/2024  9:28 AM.  Learner: Patient  Readiness: Acceptance  Method: Demonstration  Response: Verbalizes Understanding, Demonstrated Understanding, Needs Reinforcement  Comment: Difficulty with insight and recall of prior instruction.    ADL Training, taught by DAYO England at 3/5/2024  9:28 AM.  Learner: Patient  Readiness: Acceptance  Method: Demonstration  Response: Verbalizes Understanding, Demonstrated Understanding, Needs Reinforcement  Comment: Difficulty with insight and recall of prior instruction.    Education Comments  Repetition of instruction required within course of tasks.    Goals:  Encounter Problems       Encounter Problems (Active)       OT Goals       Pt will demo UE ADL completion with set-up/supervision using AE if needed.  (Progressing)       Start:  02/21/24    Expected End:  03/05/24            Pt will increase endurance to tolerate 10min of OOB activity with no more than 1 rest break in order to increase ability to engage in ADL completion.  (Progressing)       Start:  02/21/24    Expected End:  03/05/24            Pt will increase static/dynamic sitting to Good to increase safety and independence with functional task completion.   (Progressing)        Start:  02/21/24    Expected End:  03/05/24            Pt will complete [id number] transfers using LRD in preparation for ADLs with Fransisco HILLMAN (Progressing)       Start:  02/21/24    Expected End:  03/05/24

## 2024-03-05 NOTE — PRE-PROCEDURE NOTE
"Report from Sending RN:    Report From: Malick Pradhan  Recent Surgery of Procedure: No  Baseline Level of Consciousness (LOC): AOx  Oxygen Use: Yes, 2L  Type: NC  Diabetic: Yes  Last BP Med Given Day of Dialysis:    Last Pain Med Given:    Lab Tests to be Obtained with Dialysis: No  Blood Transfusion to be Given During Dialysis: No  Available IV Access: Yes  Medications to be Administered During Dialysis: Yes  Continuous IV Infusion Running: Yes  Restraints on Currently or in the Last 24 Hours: No  Hand-Off Communication:Report called to Malick Andre the navigator on 2S by Rayna Patel. Pt is on 3L of O2. She is also a diabetic. She is also supposed to be discharged after Tx. Pt has Rx to remove 2-3L of fluid. She is stable and \"OK\" to come to the room for Tx  "

## 2024-03-05 NOTE — DISCHARGE SUMMARY
"Discharge Diagnosis  Heart failure (CMS/[facility])    Issues Requiring Follow-Up  Post hospital follow up    Discharge Meds     Your medication list        START taking these medications        Instructions Last Dose Given Next Dose Due   predniSONE 20 mg tablet  Commonly known as: Deltasone  Start taking on: March 6, 2024      Take 2 tablets (40 mg) by mouth once daily for 3 days. Do not start before March 6, 2024.              CONTINUE taking these medications        Instructions Last Dose Given Next Dose Due   albuterol 90 mcg/actuation inhaler           amiodarone 200 mg tablet  Commonly known as: Pacerone           aspirin 81 mg EC tablet           atorvastatin 40 mg tablet  Commonly known as: Lipitor           BD Calista 2nd Gen Pen Needle 32 gauge x 5/32\" needle  Generic drug: pen needle, diabetic           clopidogrel 75 mg tablet  Commonly known as: Plavix           ferrous gluconate 324 (38 Fe) mg tablet  Commonly known as: Fergon      Take 1 tablet (324 mg) by mouth once daily with breakfast.       FreeStyle Shakir 14 Day Sensor kit  Generic drug: flash glucose sensor kit           HumaLOG KwikPen Insulin 100 unit/mL injection  Generic drug: insulin lispro           ipratropium-albuteroL 0.5-2.5 mg/3 mL nebulizer solution  Commonly known as: Duo-Neb           Lantus Solostar U-100 Insulin 100 unit/mL (3 mL) pen  Generic drug: insulin glargine           levothyroxine 50 mcg tablet  Commonly known as: Synthroid, Levoxyl           levothyroxine 200 mcg tablet  Commonly known as: Synthroid, Levoxyl      Take 1 tablet (200 mcg) by mouth once daily in the morning. Take before meals.       oxygen gas therapy  Commonly known as: O2           pantoprazole 40 mg EC tablet  Commonly known as: ProtoNix      Take 1 tablet (40 mg) by mouth 2 times a day.       venlafaxine XR 75 mg 24 hr capsule  Commonly known as: Effexor-XR                  STOP taking these medications      amLODIPine 5 mg tablet  Commonly known as: Norvasc   "      FreeStyle Lite Strips strip  Generic drug: blood sugar diagnostic        furosemide 80 mg tablet  Commonly known as: Lasix        Januvia 100 mg tablet  Generic drug: SITagliptin phosphate        metoprolol succinate XL 50 mg 24 hr tablet  Commonly known as: Toprol-XL        potassium chloride CR 20 mEq ER tablet  Commonly known as: Klor-Con M20        Soaanz 60 mg tablet  Generic drug: torsemide                  Where to Get Your Medications        Information about where to get these medications is not yet available    Ask your nurse or doctor about these medications  predniSONE 20 mg tablet         Test Results Pending At Discharge  Pending Labs       Order Current Status    Staphylococcus Aureus/MRSA Colonization, Culture In process            Hospital Course   Daphne Morris is a 68 y.o. female presenting with past medical history of heart failure, atrial fibrillation, Hypertension, type 2 DM transfer from [place] ER due to acute hypoxic respiratory failure secondary to pneumonia, COPD exacerbation and acute diastolic heart failure.     Acute hypoxic respiratory failure 2/2 HFpEF exacerbation, influenza B  - supplemental O2, currently on 2LNC  - cardio recs appreciated  - pulm recs appreciated  - dialysis per nephro  - finished tamiflu  - prednisone  - duoneb     HFpEF exacerbation  - cardio recs appreciated  - dialysis per nephro     Now ESRD on HD  - nephro following  - hemodialysis placed on 3/1 by surg  - dialysis per nephro    Normocytic Anemia  - Status post 1 unit of blood   - ferrous gluconate      Paroxysmal atrial fibrillation  - Status post watchman in August 2023  - amiodarone  - metoprolol     Type 2 diabetes mellitus  - lantus  - ISS     Hypertension  - Metoprolol      GERD  - PPI     Hyperlipidemia  - Atorvastatin     Depression  - Venlafaxine     Hypothyroidism  - Synthroid    Pt is hemodynamically stable for discharge at this time with close outpatient follow ups.     The patient was  discharged in satisfactory condition.   Medications and side effect profile reviewed with patient.  More than 30 minutes were spent in coordinating patient discharge       Pertinent Physical Exam At Time of Discharge  Physical Exam  Vitals and nursing note reviewed.   Constitutional:       General: She is not in acute distress.     Appearance: Normal appearance. She is not ill-appearing or toxic-appearing.   HENT:      Head: Normocephalic and atraumatic.      Mouth/Throat:      Mouth: Mucous membranes are moist.   Eyes:      Extraocular Movements: Extraocular movements intact.      Conjunctiva/sclera: Conjunctivae normal.      Pupils: Pupils are equal, round, and reactive to light.   Cardiovascular:      Rate and Rhythm: Normal rate and regular rhythm.      Heart sounds: No murmur heard.     No gallop.   Pulmonary:      Effort: Pulmonary effort is normal. No respiratory distress.      Breath sounds: Normal breath sounds. No wheezing, rhonchi or rales.   Abdominal:      General: Abdomen is flat. Bowel sounds are normal. There is no distension.      Palpations: Abdomen is soft. There is no mass.      Tenderness: There is no abdominal tenderness.   Musculoskeletal:         General: Swelling present. No tenderness. Normal range of motion.      Cervical back: Normal range of motion and neck supple.      Right lower leg: Edema present.      Left lower leg: Edema present.   Skin:     General: Skin is warm and dry.   Neurological:      General: No focal deficit present.      Mental Status: She is alert and oriented to person, place, and time. Mental status is at baseline.   Psychiatric:         Mood and Affect: Mood normal.         Behavior: Behavior normal.         Thought Content: Thought content normal.         Judgment: Judgment normal.     Outpatient Follow-Up  No future appointments.      Maria E Bustamante MD  Hospitalist

## 2024-03-05 NOTE — LETTER
Patient: Daphne Morris  : 1955    Encounter Date: 2024    PLACE OF SERVICE:  [facility]    This is a readmission.    Subjective  Patient ID: Daphne Morris is a 68 y.o. female who presents for readmission.     Ms. Cecelia Morris is a 68-year-old female with history of heart failure.  She has developed influenza B with acute hypoxic respiratory failure.  She is a diabetic, unable to care for herself.  She requires supportive care.    Review of Systems   Constitutional:  Negative for chills and fever.   Cardiovascular:  Negative for chest pain.   All other systems reviewed and are negative.    Objective  /82   Pulse 86   Temp 37 °C (98.6 °F)   Resp 18     Physical Exam  Vitals reviewed.   Constitutional:       Comments: This is a well-developed, well-nourished female, lying in bed, appearing weak   HENT:      Right Ear: Tympanic membrane, ear canal and external ear normal.      Left Ear: Tympanic membrane, ear canal and external ear normal.   Eyes:      General: No scleral icterus.     Pupils: Pupils are equal, round, and reactive to light.   Neck:      Vascular: No carotid bruit.   Cardiovascular:      Heart sounds: Normal heart sounds, S1 normal and S2 normal. No murmur heard.     No friction rub.   Pulmonary:      Breath sounds: Decreased breath sounds (throughout) present.   Abdominal:      Palpations: There is no hepatomegaly, splenomegaly or mass.   Musculoskeletal:         General: No swelling or deformity. Normal range of motion.      Cervical back: Neck supple.      Right lower leg: Edema present.      Left lower leg: Edema present.   Lymphadenopathy:      Cervical: No cervical adenopathy.      Upper Body:      Right upper body: No axillary adenopathy.      Left upper body: No axillary adenopathy.      Lower Body: No right inguinal adenopathy. No left inguinal adenopathy.   Neurological:      Mental Status: She is oriented to person, place,  and time. She is lethargic.      Cranial Nerves: Cranial nerves 2-12 are intact. No cranial nerve deficit.      Sensory: No sensory deficit.      Motor: Motor function is intact. No weakness.      Gait: Gait is intact.      Deep Tendon Reflexes: Reflexes normal.   Psychiatric:         Mood and Affect: Mood normal. Mood is not anxious or depressed. Affect is not angry.         Behavior: Behavior is not agitated.         Thought Content: Thought content normal.         Judgment: Judgment normal.     LAB WORK:  Laboratory studies were reviewed.    Assessment/Plan  Problem List Items Addressed This Visit             ICD-10-CM       Cardiac and Vasculature    Hypertension I10    Hyperlipidemia, unspecified E78.5       Endocrine/Metabolic    Hypothyroidism E03.9    Type 2 diabetes mellitus (CMS/[facility]) E11.9       Gastrointestinal and Abdominal    Gastroesophageal reflux disease K21.9       Hematology and Neoplasia    Absolute anemia D64.9       Mental Health    Depression F32.A     Other Visit Diagnoses         Codes    Congestive heart failure, unspecified HF chronicity, unspecified heart failure type (CMS/[facility])    -  Primary I50.9    Respiratory failure, unspecified chronicity, unspecified whether with hypoxia or hypercapnia (CMS/[facility])     J96.90    ESRD (end stage renal disease) (CMS/[facility])     N18.6    Atrial fibrillation, unspecified type (CMS/[facility])     I48.91    Weakness     R53.1        1. Respiratory failure, on oxygen.  2. Heart failure, on diuretic.  3. End-stage renal disease, on hemodialysis.  4. Diabetes, on insulin.  5. Atrial fibrillation, on rate control.  6. Anemia.  Follow CBC.  7. Hypertension, on metoprolol.  8. Reflux disease, on PPI.  9. Hyperlipidemia, on atorvastatin.  10. Depression, on medication.  11. Hypothyroidism, on levothyroxine.  12. Weakness, on PT/OT.    Scribe Attestation  By signing my name below, Ann RODRIGUEZ, Ann attest that this documentation has been prepared under the direction and in  the presence of Cale Lowe MD.     All medical record entries made by the scribe were personally dictated by me I have reviewed the chart and agree the record accurately reflects my personal performance of his history physical examination and management      Electronically Signed By: Cale Lowe MD   3/12/24 10:38 PM

## 2024-03-05 NOTE — PROGRESS NOTES
Physical Therapy    Physical Therapy Treatment    Patient Name: Daphne Morris  MRN: [id number]  Today's Date: 3/5/2024  Time Calculation  Start Time: 0840  Stop Time: 0903  Time Calculation (min): 23 min       Assessment/Plan   PT Assessment  PT Assessment Results: Decreased strength, Decreased endurance, Impaired balance, Decreased mobility  Barriers to Discharge: Decreased activity tolerance  End of Session Communication: Bedside nurse  End of Session Patient Position: Alarm on (sitting EOB with back support, BEAT[address] Way notified andaware)  PT Plan  Inpatient/Swing Bed or Outpatient: Inpatient  PT Plan  Treatment/Interventions: Bed mobility, Transfer training, Balance training, Strengthening, Endurance training  PT Plan: Skilled PT  PT Frequency: 3 times per week  PT Discharge Recommendations: Moderate intensity level of continued care  Equipment Recommended upon Discharge: Wheeled walker  PT Recommended Transfer Status: Assist x2  PT - OK to Discharge: Yes      General Visit Information:   PT  Visit  PT Received On: 03/05/24  General  Reason for Referral: Impaired functional mobility; (+) flu  Referred By: Myke Newsome MD  Past Medical History Relevant to Rehab:  (CHF/COPD, new ESRD wtih dialysis)  Missed Visit: No  Co-Treatment: OT  Co-Treatment Reason: To maximize pt safety and outcomes  Prior to Session Communication: Bedside nurse, Care Coordinator  Patient Position Received: Bed, 3 rail up, Alarm on  Preferred Learning Style: verbal, visual  General Comment: AXOX2-3, 02 at 2L with resting po2 reading of 94%, Anasarca, Tele and IV    Subjective   Precautions:  Precautions  Precautions Comment: Isolation dc'd  Vital Signs:       Objective   Pain:  Pain Assessment  Pain Assessment: 0-10  Pain Score: 0 - No pain  Cognition:  Cognition  Attention: Within Functional Limits  Memory: Exceptions to WFL  Problem Solving: Exceptions to WFL  Complex Functional Tasks: Impaired  Safety/Judgement: Within  Functional Limits  Insight: Mild  Postural Control:  Static Sitting Balance  Static Sitting-Balance Support: Feet supported, Bilateral upper extremity supported, No upper extremity supported  Static Sitting-Level of Assistance: Close supervision, Modified independent (static/dynamic sitting EOB x8 minutes performing ADL's, trunk control and balance with close S and VC, then set up to eat breakfast with back support)  Dynamic Standing Balance  Dynamic Standing-Balance Support: Bilateral upper extremity supported  Dynamic Standing-Balance: Forward lean  Dynamic Standing-Comments:  ([id number] with MOD/Min A 2 assist and FWW)  Extremity/Trunk Assessments:    Activity Tolerance:  Activity Tolerance  Endurance: Tolerates 10 - 20 min exercise with multiple rests  Treatments:       Therapeutic Activity  Therapeutic Activity Performed: Yes  Therapeutic Activity 1: Generalized ROM bilat LE's (stiff, edemetous and weak)              Bed Mobility  Bed Mobility: Yes  Bed Mobility 1  Bed Mobility 1: Supine to sitting, Scooting  Level of Assistance 1: Moderate verbal cues, Moderate assistance, Moderate tactile cues (1-2 assist)       Transfers  Transfer: Yes  Transfer 1  Transfer From 1: Sit to, Stand to  Transfer to 1: Stand, Sit  Technique 1: Sit to stand, Stand to sit  Transfer Device 1: Walker (raised surface)  Transfer Level of Assistance 1: Maximum verbal cues, Maximum assistance, +2  Trials/Comments 1:  (x1)         Outcome Measures:  [facility] Basic Mobility  Turning from your back to your side while in a flat bed without using bedrails: A lot  Moving from lying on your back to sitting on the side of a flat bed without using bedrails: A lot  Moving to and from bed to chair (including a wheelchair): Total  Standing up from a chair using your arms (e.g. wheelchair or bedside chair): Total  To walk in hospital room: Total  Climbing 3-5 steps with railing: Total  Basic Mobility - Total Score: 8    Education  Documentation  Handouts, taught by Francy Bennett PTA at 3/5/2024  9:38 AM.  Learner: Patient  Readiness: Acceptance  Method: Explanation  Response: Needs Reinforcement    Precautions, taught by Francy Bennett PTA at 3/5/2024  9:38 AM.  Learner: Patient  Readiness: Acceptance  Method: Explanation  Response: Needs Reinforcement    Body Mechanics, taught by Francy Bennett PTA at 3/5/2024  9:38 AM.  Learner: Patient  Readiness: Acceptance  Method: Explanation  Response: Needs Reinforcement    Home Exercise Program, taught by Francy Bennett PTA at 3/5/2024  9:38 AM.  Learner: Patient  Readiness: Acceptance  Method: Explanation  Response: Needs Reinforcement    Mobility Training, taught by Francy Bennett PTA at 3/5/2024  9:38 AM.  Learner: Patient  Readiness: Acceptance  Method: Explanation  Response: Needs Reinforcement    Education Comments  No comments found.        OP EDUCATION:       Encounter Problems       Encounter Problems (Active)       Balance       STG - Maintains static standing balance with upper extremity support x 1' with max A  (Progressing)       Start:  02/21/24    Expected End:  03/05/24            STG - Maintains dynamic sitting balance without upper extremity support x 10'  (Progressing)       Start:  02/21/24    Expected End:  03/05/24               Pain - Adult          Transfers       STG - Patient will perform bed mobility min A  (Progressing)       Start:  02/21/24    Expected End:  03/05/24            STG - Patient will transfer sit to and from stand max A  (Progressing)       Start:  02/21/24    Expected End:  03/05/24

## 2024-03-05 NOTE — CARE PLAN
The patient's goals for the shift include      The clinical goals for the shift include patient will remain HDS    Over the shift, the patient did not make progress toward the following goals. Barriers to progression include none. Recommendations to address these barriers include continue with plan of care.

## 2024-03-05 NOTE — PROGRESS NOTES
We are following for ANDREA on CKD in setting of acute decompensated heart failure.  Now on dialysis    Seen on dialysis.  I reviewed treatment orders and confirmed with the dialysis staff.  Goal UF 3 L and she is tolerating.  Has a right chest wall tunneled dialysis catheter that is working without issues.  Remains severely volume overloaded with +3 pitting edema    Pending dispo    ANDREA now ESRD that is stable  Acute decompensated heart failure that has not responded to standard treatment and is now dialysis dependent    Overall prognosis is guarded.  Okay for d/c

## 2024-03-05 NOTE — POST-PROCEDURE NOTE
Report to Receiving RN:    Report To: Tiny Patel  Time Report Called: 1400  Hand-Off Communication: Daphne completed and tolerated her full Tx. She removed 3L w/o issue or complication. Post Tx  BP is 188/92 74-Dr. Santamaria notified. Her catheter dressing was changed, dated and initialed. Vitals during Tx can be viewed in her flowsheet.  Complications During Treatment: No  Ultrafiltration Treatment: Yes  Medications Administered During Dialysis: Yes  Blood Products Administered During Dialysis: No  Labs Sent During Dialysis: No  Heparin Drip Rate Changes: No    Electronic Signatures:  Elle Mcguire OCDT    Last Updated: 2:45 PM by ELLE MCGUIRE

## 2024-03-06 LAB — STAPHYLOCOCCUS SPEC CULT: ABNORMAL

## 2024-03-09 ENCOUNTER — NURSING HOME VISIT (OUTPATIENT)
Dept: POST ACUTE CARE | Facility: EXTERNAL LOCATION | Age: 69
End: 2024-03-09

## 2024-03-09 ENCOUNTER — APPOINTMENT (OUTPATIENT)
Dept: CARDIOLOGY | Facility: HOSPITAL | Age: 69
End: 2024-03-09
Payer: MEDICARE

## 2024-03-09 ENCOUNTER — APPOINTMENT (OUTPATIENT)
Dept: RADIOLOGY | Facility: HOSPITAL | Age: 69
End: 2024-03-09
Payer: MEDICARE

## 2024-03-09 ENCOUNTER — HOSPITAL ENCOUNTER (EMERGENCY)
Facility: HOSPITAL | Age: 69
Discharge: HOME | End: 2024-03-09
Attending: STUDENT IN AN ORGANIZED HEALTH CARE EDUCATION/TRAINING PROGRAM
Payer: MEDICARE

## 2024-03-09 VITALS
OXYGEN SATURATION: 99 % | DIASTOLIC BLOOD PRESSURE: 104 MMHG | HEART RATE: 67 BPM | HEIGHT: 66 IN | WEIGHT: 180.56 LBS | RESPIRATION RATE: 17 BRPM | TEMPERATURE: 96.4 F | BODY MASS INDEX: 29.02 KG/M2 | SYSTOLIC BLOOD PRESSURE: 137 MMHG

## 2024-03-09 DIAGNOSIS — I50.9 CONGESTIVE HEART FAILURE, UNSPECIFIED HF CHRONICITY, UNSPECIFIED HEART FAILURE TYPE (MULTI): Primary | ICD-10-CM

## 2024-03-09 DIAGNOSIS — I48.91 ATRIAL FIBRILLATION, UNSPECIFIED TYPE (MULTI): ICD-10-CM

## 2024-03-09 DIAGNOSIS — J96.90 RESPIRATORY FAILURE, UNSPECIFIED CHRONICITY, UNSPECIFIED WHETHER WITH HYPOXIA OR HYPERCAPNIA (MULTI): ICD-10-CM

## 2024-03-09 DIAGNOSIS — J44.9 COPD ON LONG-TERM INHALED STEROID THERAPY (MULTI): ICD-10-CM

## 2024-03-09 DIAGNOSIS — E03.9 HYPOTHYROIDISM, UNSPECIFIED TYPE: ICD-10-CM

## 2024-03-09 DIAGNOSIS — N18.9 CHRONIC KIDNEY DISEASE, UNSPECIFIED CKD STAGE: ICD-10-CM

## 2024-03-09 DIAGNOSIS — I63.9 CEREBROVASCULAR ACCIDENT (CVA), UNSPECIFIED MECHANISM (MULTI): ICD-10-CM

## 2024-03-09 DIAGNOSIS — J18.9 PNEUMONIA OF RIGHT LOWER LOBE DUE TO INFECTIOUS ORGANISM: Primary | ICD-10-CM

## 2024-03-09 DIAGNOSIS — D64.9 ANEMIA, UNSPECIFIED TYPE: ICD-10-CM

## 2024-03-09 DIAGNOSIS — R53.1 WEAKNESS: ICD-10-CM

## 2024-03-09 DIAGNOSIS — Z79.51 COPD ON LONG-TERM INHALED STEROID THERAPY (MULTI): ICD-10-CM

## 2024-03-09 DIAGNOSIS — I10 HYPERTENSION, UNSPECIFIED TYPE: ICD-10-CM

## 2024-03-09 DIAGNOSIS — F32.A DEPRESSION, UNSPECIFIED DEPRESSION TYPE: ICD-10-CM

## 2024-03-09 DIAGNOSIS — E78.5 HYPERLIPIDEMIA, UNSPECIFIED HYPERLIPIDEMIA TYPE: ICD-10-CM

## 2024-03-09 LAB
ALBUMIN SERPL BCP-MCNC: 2.6 G/DL (ref 3.4–5)
ALP SERPL-CCNC: 81 U/L (ref 33–136)
ALT SERPL W P-5'-P-CCNC: 14 U/L (ref 7–45)
ANION GAP SERPL CALC-SCNC: 10 MMOL/L (ref 10–20)
AST SERPL W P-5'-P-CCNC: 18 U/L (ref 9–39)
BASOPHILS # BLD AUTO: 0.02 X10*3/UL (ref 0–0.1)
BASOPHILS NFR BLD AUTO: 0.2 %
BILIRUB SERPL-MCNC: 0.4 MG/DL (ref 0–1.2)
BUN SERPL-MCNC: 7 MG/DL (ref 6–23)
CALCIUM SERPL-MCNC: 7.6 MG/DL (ref 8.6–10.3)
CHLORIDE SERPL-SCNC: 103 MMOL/L (ref 98–107)
CO2 SERPL-SCNC: 33 MMOL/L (ref 21–32)
CREAT SERPL-MCNC: 0.84 MG/DL (ref 0.5–1.05)
EGFRCR SERPLBLD CKD-EPI 2021: 75 ML/MIN/1.73M*2
EOSINOPHIL # BLD AUTO: 0.02 X10*3/UL (ref 0–0.7)
EOSINOPHIL NFR BLD AUTO: 0.2 %
ERYTHROCYTE [DISTWIDTH] IN BLOOD BY AUTOMATED COUNT: 14.3 % (ref 11.5–14.5)
FLUAV RNA RESP QL NAA+PROBE: NOT DETECTED
FLUBV RNA RESP QL NAA+PROBE: NOT DETECTED
GLUCOSE SERPL-MCNC: 95 MG/DL (ref 74–99)
HCT VFR BLD AUTO: 27 % (ref 36–46)
HGB BLD-MCNC: 7.9 G/DL (ref 12–16)
IMM GRANULOCYTES # BLD AUTO: 0.09 X10*3/UL (ref 0–0.7)
IMM GRANULOCYTES NFR BLD AUTO: 0.8 % (ref 0–0.9)
LYMPHOCYTES # BLD AUTO: 0.62 X10*3/UL (ref 1.2–4.8)
LYMPHOCYTES NFR BLD AUTO: 5.5 %
MCH RBC QN AUTO: 29.8 PG (ref 26–34)
MCHC RBC AUTO-ENTMCNC: 29.3 G/DL (ref 32–36)
MCV RBC AUTO: 102 FL (ref 80–100)
MONOCYTES # BLD AUTO: 0.57 X10*3/UL (ref 0.1–1)
MONOCYTES NFR BLD AUTO: 5 %
NEUTROPHILS # BLD AUTO: 10.02 X10*3/UL (ref 1.2–7.7)
NEUTROPHILS NFR BLD AUTO: 88.3 %
NRBC BLD-RTO: 0 /100 WBCS (ref 0–0)
PLATELET # BLD AUTO: 171 X10*3/UL (ref 150–450)
POTASSIUM SERPL-SCNC: 3.4 MMOL/L (ref 3.5–5.3)
PROT SERPL-MCNC: 5.2 G/DL (ref 6.4–8.2)
RBC # BLD AUTO: 2.65 X10*6/UL (ref 4–5.2)
RSV RNA RESP QL NAA+PROBE: NOT DETECTED
SARS-COV-2 RNA RESP QL NAA+PROBE: NOT DETECTED
SODIUM SERPL-SCNC: 143 MMOL/L (ref 136–145)
WBC # BLD AUTO: 11.3 X10*3/UL (ref 4.4–11.3)

## 2024-03-09 PROCEDURE — 2500000001 HC RX 250 WO HCPCS SELF ADMINISTERED DRUGS (ALT 637 FOR MEDICARE OP)

## 2024-03-09 PROCEDURE — 87636 SARSCOV2 & INF A&B AMP PRB: CPT | Performed by: STUDENT IN AN ORGANIZED HEALTH CARE EDUCATION/TRAINING PROGRAM

## 2024-03-09 PROCEDURE — 99309 SBSQ NF CARE MODERATE MDM 30: CPT | Performed by: INTERNAL MEDICINE

## 2024-03-09 PROCEDURE — 80053 COMPREHEN METABOLIC PANEL: CPT | Performed by: STUDENT IN AN ORGANIZED HEALTH CARE EDUCATION/TRAINING PROGRAM

## 2024-03-09 PROCEDURE — 71045 X-RAY EXAM CHEST 1 VIEW: CPT

## 2024-03-09 PROCEDURE — 71045 X-RAY EXAM CHEST 1 VIEW: CPT | Performed by: RADIOLOGY

## 2024-03-09 PROCEDURE — 99283 EMERGENCY DEPT VISIT LOW MDM: CPT | Mod: 25

## 2024-03-09 PROCEDURE — 85025 COMPLETE CBC W/AUTO DIFF WBC: CPT | Performed by: STUDENT IN AN ORGANIZED HEALTH CARE EDUCATION/TRAINING PROGRAM

## 2024-03-09 PROCEDURE — 93005 ELECTROCARDIOGRAM TRACING: CPT

## 2024-03-09 RX ORDER — LEVOFLOXACIN 250 MG/1
750 TABLET ORAL DAILY
Qty: 15 TABLET | Refills: 0 | Status: SHIPPED | OUTPATIENT
Start: 2024-03-09 | End: 2024-03-14

## 2024-03-09 RX ORDER — LEVOFLOXACIN 500 MG/1
750 TABLET, FILM COATED ORAL ONCE
Status: COMPLETED | OUTPATIENT
Start: 2024-03-09 | End: 2024-03-09

## 2024-03-09 RX ORDER — LEVOFLOXACIN 500 MG/1
TABLET, FILM COATED ORAL
Status: COMPLETED
Start: 2024-03-09 | End: 2024-03-09

## 2024-03-09 RX ADMIN — LEVOFLOXACIN 750 MG: 500 TABLET, FILM COATED ORAL at 18:53

## 2024-03-09 ASSESSMENT — PAIN - FUNCTIONAL ASSESSMENT: PAIN_FUNCTIONAL_ASSESSMENT: 0-10

## 2024-03-09 ASSESSMENT — PAIN SCALES - GENERAL
PAINLEVEL_OUTOF10: 0 - NO PAIN
PAINLEVEL_OUTOF10: 0 - NO PAIN

## 2024-03-09 NOTE — LETTER
Patient: Daphne Morris  : 1955    Encounter Date: 2024    PLACE OF SERVICE:  [facility]    This is a subsequent visit.    Subjective  Patient ID: Daphne Morris is a 68 y.o. female who presents for Follow-up.    Ms. Cecelia Morris is a 68-year-old female with history of heart failure and COPD.  She has had prior CVA affecting her right side.  She is unable to care for herself and manage her affairs.  She requires supportive care.    Review of Systems   Constitutional:  Negative for chills and fever.   Cardiovascular:  Negative for chest pain.   All other systems reviewed and are negative.    Objective  /80   Pulse 78   Temp 36.7 °C (98.1 °F)   Resp 18     Physical Exam  Vitals reviewed.   Constitutional:       General: She is not in acute distress.     Comments: This is a well-developed, well-nourished female, sitting in a chair   HENT:      Right Ear: Tympanic membrane, ear canal and external ear normal.      Left Ear: Tympanic membrane, ear canal and external ear normal.   Eyes:      General: No scleral icterus.     Pupils: Pupils are equal, round, and reactive to light.   Neck:      Vascular: No carotid bruit.   Cardiovascular:      Heart sounds: Normal heart sounds, S1 normal and S2 normal. No murmur heard.     No friction rub.   Pulmonary:      Breath sounds: Decreased breath sounds (throughout) present.   Abdominal:      Palpations: There is no hepatomegaly, splenomegaly or mass.   Musculoskeletal:         General: No swelling or deformity. Normal range of motion.      Cervical back: Neck supple.      Right lower leg: Edema present.      Left lower leg: Edema present.   Lymphadenopathy:      Cervical: No cervical adenopathy.      Upper Body:      Right upper body: No axillary adenopathy.      Left upper body: No axillary adenopathy.      Lower Body: No right inguinal adenopathy. No left inguinal adenopathy.   Neurological:      Mental Status:  She is oriented to person, place, and time.      Cranial Nerves: Cranial nerves 2-12 are intact. No cranial nerve deficit.      Sensory: No sensory deficit.      Motor: Weakness (right-sided) present.      Gait: Gait is intact.      Deep Tendon Reflexes: Reflexes normal.   Psychiatric:         Mood and Affect: Mood normal. Mood is not anxious or depressed. Affect is not angry.         Behavior: Behavior is not agitated.         Thought Content: Thought content normal.         Judgment: Judgment normal.     LAB WORK:  Laboratory studies were reviewed.    Assessment/Plan  Problem List Items Addressed This Visit             ICD-10-CM       Cardiac and Vasculature    Hypertension I10    Hyperlipidemia, unspecified E78.5       Endocrine/Metabolic    Hypothyroidism E03.9       Hematology and Neoplasia    Absolute anemia D64.9       Mental Health    Depression F32.A     Other Visit Diagnoses         Codes    Congestive heart failure, unspecified HF chronicity, unspecified heart failure type (CMS/[facility])    -  Primary I50.9    COPD on long-term inhaled steroid therapy (CMS/[facility])     J44.9, Z79.51    Respiratory failure, unspecified chronicity, unspecified whether with hypoxia or hypercapnia (CMS/[facility])     J96.90    Cerebrovascular accident (CVA), unspecified mechanism (CMS/[facility])     I63.9    Chronic kidney disease, unspecified CKD stage     N18.9    Atrial fibrillation, unspecified type (CMS/[facility])     I48.91    Weakness     R53.1        1. Congestive heart failure, on diuretic.  2. COPD, on bronchodilator therapy.  3. Respiratory failure, on oxygen.  4. CVA, on supportive care.  5. CKD.  Monitor BMP.  6. Hypertension, medically controlled.  7. Anemia.  Follow CBC.  8. Atrial fibrillation.  Rate control.  9. Hypothyroidism, on levothyroxine.  10. Depression, on medication.  11. Hyperlipidemia, on statin.  12. Weakness, on PT/OT.    Scribe Attestation  By signing my name below, IAnn, Ann attest that this documentation has  been prepared under the direction and in the presence of Cale Lowe MD.       All medical record entries made by the scribe were personally dictated by me I have reviewed the chart and agree the record accurately reflects my personal performance of his history physical examination and management      Electronically Signed By: Cale Lowe MD   3/14/24 11:51 PM

## 2024-03-11 VITALS
HEART RATE: 86 BPM | SYSTOLIC BLOOD PRESSURE: 130 MMHG | DIASTOLIC BLOOD PRESSURE: 82 MMHG | TEMPERATURE: 98.6 F | RESPIRATION RATE: 18 BRPM

## 2024-03-11 RX ORDER — FLASH GLUCOSE SENSOR
KIT MISCELLANEOUS
Refills: 0 | OUTPATIENT
Start: 2024-03-11

## 2024-03-11 ASSESSMENT — ENCOUNTER SYMPTOMS
CHILLS: 0
FEVER: 0

## 2024-03-11 NOTE — PROGRESS NOTES
PLACE OF SERVICE:  [facility] [place]    This is a readmission.    Subjective   Patient ID: Daphne Morris is a 68 y.o. female who presents for readmission.     Ms. Cecelia Morris is a 68-year-old female with history of heart failure.  She has developed influenza B with acute hypoxic respiratory failure.  She is a diabetic, unable to care for herself.  She requires supportive care.    Review of Systems   Constitutional:  Negative for chills and fever.   Cardiovascular:  Negative for chest pain.   All other systems reviewed and are negative.    Objective   /82   Pulse 86   Temp 37 °C (98.6 °F)   Resp 18     Physical Exam  Vitals reviewed.   Constitutional:       Comments: This is a well-developed, well-nourished female, lying in bed, appearing weak   HENT:      Right Ear: Tympanic membrane, ear canal and external ear normal.      Left Ear: Tympanic membrane, ear canal and external ear normal.   Eyes:      General: No scleral icterus.     Pupils: Pupils are equal, round, and reactive to light.   Neck:      Vascular: No carotid bruit.   Cardiovascular:      Heart sounds: Normal heart sounds, S1 normal and S2 normal. No murmur heard.     No friction rub.   Pulmonary:      Breath sounds: Decreased breath sounds (throughout) present.   Abdominal:      Palpations: There is no hepatomegaly, splenomegaly or mass.   Musculoskeletal:         General: No swelling or deformity. Normal range of motion.      Cervical back: Neck supple.      Right lower leg: Edema present.      Left lower leg: Edema present.   Lymphadenopathy:      Cervical: No cervical adenopathy.      Upper Body:      Right upper body: No axillary adenopathy.      Left upper body: No axillary adenopathy.      Lower Body: No right inguinal adenopathy. No left inguinal adenopathy.   Neurological:      Mental Status: She is oriented to person, place, and time. She is lethargic.      Cranial Nerves: Cranial nerves 2-12 are  intact. No cranial nerve deficit.      Sensory: No sensory deficit.      Motor: Motor function is intact. No weakness.      Gait: Gait is intact.      Deep Tendon Reflexes: Reflexes normal.   Psychiatric:         Mood and Affect: Mood normal. Mood is not anxious or depressed. Affect is not angry.         Behavior: Behavior is not agitated.         Thought Content: Thought content normal.         Judgment: Judgment normal.     LAB WORK:  Laboratory studies were reviewed.    Assessment/Plan   Problem List Items Addressed This Visit             ICD-10-CM       Cardiac and Vasculature    Hypertension I10    Hyperlipidemia, unspecified E78.5       Endocrine/Metabolic    Hypothyroidism E03.9    Type 2 diabetes mellitus (CMS/[facility]) E11.9       Gastrointestinal and Abdominal    Gastroesophageal reflux disease K21.9       Hematology and Neoplasia    Absolute anemia D64.9       Mental Health    Depression F32.A     Other Visit Diagnoses         Codes    Congestive heart failure, unspecified HF chronicity, unspecified heart failure type (CMS/[facility])    -  Primary I50.9    Respiratory failure, unspecified chronicity, unspecified whether with hypoxia or hypercapnia (CMS/[facility])     J96.90    ESRD (end stage renal disease) (CMS/[facility])     N18.6    Atrial fibrillation, unspecified type (CMS/[facility])     I48.91    Weakness     R53.1        1. Respiratory failure, on oxygen.  2. Heart failure, on diuretic.  3. End-stage renal disease, on hemodialysis.  4. Diabetes, on insulin.  5. Atrial fibrillation, on rate control.  6. Anemia.  Follow CBC.  7. Hypertension, on metoprolol.  8. Reflux disease, on PPI.  9. Hyperlipidemia, on atorvastatin.  10. Depression, on medication.  11. Hypothyroidism, on levothyroxine.  12. Weakness, on PT/OT.    Scribe Attestation  By signing my name below, Ann RODRIGUEZ Scribe attest that this documentation has been prepared under the direction and in the presence of Cale Lowe MD.     All medical record entries  made by the scribe were personally dictated by me I have reviewed the chart and agree the record accurately reflects my personal performance of his history physical examination and management

## 2024-03-12 ENCOUNTER — NURSING HOME VISIT (OUTPATIENT)
Dept: POST ACUTE CARE | Facility: EXTERNAL LOCATION | Age: 69
End: 2024-03-12
Payer: MEDICARE

## 2024-03-12 VITALS
TEMPERATURE: 98.1 F | DIASTOLIC BLOOD PRESSURE: 80 MMHG | SYSTOLIC BLOOD PRESSURE: 126 MMHG | RESPIRATION RATE: 18 BRPM | HEART RATE: 78 BPM

## 2024-03-12 DIAGNOSIS — J96.21 ACUTE ON CHRONIC RESPIRATORY FAILURE WITH HYPOXIA AND HYPERCAPNIA (MULTI): Primary | ICD-10-CM

## 2024-03-12 DIAGNOSIS — M79.604 PAIN IN BOTH LOWER EXTREMITIES: ICD-10-CM

## 2024-03-12 DIAGNOSIS — E04.1 LEFT THYROID NODULE: ICD-10-CM

## 2024-03-12 DIAGNOSIS — J90 PLEURAL EFFUSION ON RIGHT: ICD-10-CM

## 2024-03-12 DIAGNOSIS — N17.9 ACUTE KIDNEY INJURY SUPERIMPOSED ON CKD (CMS-HCC): ICD-10-CM

## 2024-03-12 DIAGNOSIS — D63.1 ANEMIA DUE TO CHRONIC KIDNEY DISEASE, ON CHRONIC DIALYSIS (MULTI): ICD-10-CM

## 2024-03-12 DIAGNOSIS — R53.1 WEAKNESS: ICD-10-CM

## 2024-03-12 DIAGNOSIS — Z79.4 TYPE 2 DIABETES MELLITUS WITHOUT COMPLICATION, WITH LONG-TERM CURRENT USE OF INSULIN (MULTI): ICD-10-CM

## 2024-03-12 DIAGNOSIS — M79.605 PAIN IN BOTH LOWER EXTREMITIES: ICD-10-CM

## 2024-03-12 DIAGNOSIS — R60.1 ANASARCA: ICD-10-CM

## 2024-03-12 DIAGNOSIS — J18.9 COMMUNITY ACQUIRED PNEUMONIA, UNSPECIFIED LATERALITY: ICD-10-CM

## 2024-03-12 DIAGNOSIS — F32.A DEPRESSION, UNSPECIFIED DEPRESSION TYPE: ICD-10-CM

## 2024-03-12 DIAGNOSIS — I48.91 ATRIAL FIBRILLATION, UNSPECIFIED TYPE (MULTI): ICD-10-CM

## 2024-03-12 DIAGNOSIS — I13.0 CARDIORENAL SYNDROME WITH RENAL FAILURE, STAGE 1-4 OR UNSPECIFIED CHRONIC KIDNEY DISEASE, WITH HEART FAILURE (MULTI): ICD-10-CM

## 2024-03-12 DIAGNOSIS — Z99.2 ANEMIA DUE TO CHRONIC KIDNEY DISEASE, ON CHRONIC DIALYSIS (MULTI): ICD-10-CM

## 2024-03-12 DIAGNOSIS — E11.9 TYPE 2 DIABETES MELLITUS WITHOUT COMPLICATION, WITH LONG-TERM CURRENT USE OF INSULIN (MULTI): ICD-10-CM

## 2024-03-12 DIAGNOSIS — R53.81 PHYSICAL DECONDITIONING: ICD-10-CM

## 2024-03-12 DIAGNOSIS — N18.6 ANEMIA DUE TO CHRONIC KIDNEY DISEASE, ON CHRONIC DIALYSIS (MULTI): ICD-10-CM

## 2024-03-12 DIAGNOSIS — E03.9 HYPOTHYROIDISM, UNSPECIFIED TYPE: ICD-10-CM

## 2024-03-12 DIAGNOSIS — J96.22 ACUTE ON CHRONIC RESPIRATORY FAILURE WITH HYPOXIA AND HYPERCAPNIA (MULTI): Primary | ICD-10-CM

## 2024-03-12 DIAGNOSIS — N18.9 ACUTE KIDNEY INJURY SUPERIMPOSED ON CKD (CMS-HCC): ICD-10-CM

## 2024-03-12 DIAGNOSIS — E78.5 HYPERLIPIDEMIA, UNSPECIFIED HYPERLIPIDEMIA TYPE: ICD-10-CM

## 2024-03-12 LAB
ATRIAL RATE: 68 BPM
P AXIS: 98 DEGREES
P OFFSET: 174 MS
P ONSET: 142 MS
PR INTERVAL: 150 MS
Q ONSET: 217 MS
QRS COUNT: 11 BEATS
QRS DURATION: 116 MS
QT INTERVAL: 458 MS
QTC CALCULATION(BAZETT): 487 MS
QTC FREDERICIA: 477 MS
R AXIS: -51 DEGREES
T AXIS: 98 DEGREES
T OFFSET: 446 MS
VENTRICULAR RATE: 68 BPM

## 2024-03-12 PROCEDURE — G0317 PROLONG NURSING FAC EVAL 15M: HCPCS | Performed by: NURSE PRACTITIONER

## 2024-03-12 PROCEDURE — 99310 SBSQ NF CARE HIGH MDM 45: CPT | Performed by: NURSE PRACTITIONER

## 2024-03-12 ASSESSMENT — ENCOUNTER SYMPTOMS
FEVER: 0
CHILLS: 0

## 2024-03-12 NOTE — PROGRESS NOTES
[facility]    This is a subsequent visit.    Subjective   Patient ID: Daphne Morris is a 68 y.o. female who presents for Follow-up.    Ms. Cecelia Morris is a 68-year-old female with history of heart failure and COPD.  She has had prior CVA affecting her right side.  She is unable to care for herself and manage her affairs.  She requires supportive care.    Review of Systems   Constitutional:  Negative for chills and fever.   Cardiovascular:  Negative for chest pain.   All other systems reviewed and are negative.    Objective   /80   Pulse 78   Temp 36.7 °C (98.1 °F)   Resp 18     Physical Exam  Vitals reviewed.   Constitutional:       General: She is not in acute distress.     Comments: This is a well-developed, well-nourished female, sitting in a chair   HENT:      Right Ear: Tympanic membrane, ear canal and external ear normal.      Left Ear: Tympanic membrane, ear canal and external ear normal.   Eyes:      General: No scleral icterus.     Pupils: Pupils are equal, round, and reactive to light.   Neck:      Vascular: No carotid bruit.   Cardiovascular:      Heart sounds: Normal heart sounds, S1 normal and S2 normal. No murmur heard.     No friction rub.   Pulmonary:      Breath sounds: Decreased breath sounds (throughout) present.   Abdominal:      Palpations: There is no hepatomegaly, splenomegaly or mass.   Musculoskeletal:         General: No swelling or deformity. Normal range of motion.      Cervical back: Neck supple.      Right lower leg: Edema present.      Left lower leg: Edema present.   Lymphadenopathy:      Cervical: No cervical adenopathy.      Upper Body:      Right upper body: No axillary adenopathy.      Left upper body: No axillary adenopathy.      Lower Body: No right inguinal adenopathy. No left inguinal adenopathy.   Neurological:      Mental Status: She is oriented to person, place, and time.      Cranial Nerves:  Cranial nerves 2-12 are intact. No cranial nerve deficit.      Sensory: No sensory deficit.      Motor: Weakness (right-sided) present.      Gait: Gait is intact.      Deep Tendon Reflexes: Reflexes normal.   Psychiatric:         Mood and Affect: Mood normal. Mood is not anxious or depressed. Affect is not angry.         Behavior: Behavior is not agitated.         Thought Content: Thought content normal.         Judgment: Judgment normal.     LAB WORK:  Laboratory studies were reviewed.    Assessment/Plan   Problem List Items Addressed This Visit             ICD-10-CM       Cardiac and Vasculature    Hypertension I10    Hyperlipidemia, unspecified E78.5       Endocrine/Metabolic    Hypothyroidism E03.9       Hematology and Neoplasia    Absolute anemia D64.9       Mental Health    Depression F32.A     Other Visit Diagnoses         Codes    Congestive heart failure, unspecified HF chronicity, unspecified heart failure type (CMS/[facility])    -  Primary I50.9    COPD on long-term inhaled steroid therapy (CMS/[facility])     J44.9, Z79.51    Respiratory failure, unspecified chronicity, unspecified whether with hypoxia or hypercapnia (CMS/[facility])     J96.90    Cerebrovascular accident (CVA), unspecified mechanism (CMS/[facility])     I63.9    Chronic kidney disease, unspecified CKD stage     N18.9    Atrial fibrillation, unspecified type (CMS/[facility])     I48.91    Weakness     R53.1        1. Congestive heart failure, on diuretic.  2. COPD, on bronchodilator therapy.  3. Respiratory failure, on oxygen.  4. CVA, on supportive care.  5. CKD.  Monitor BMP.  6. Hypertension, medically controlled.  7. Anemia.  Follow CBC.  8. Atrial fibrillation.  Rate control.  9. Hypothyroidism, on levothyroxine.  10. Depression, on medication.  11. Hyperlipidemia, on statin.  12. Weakness, on PT/OT.    Scribe Attestation  By signing my name below, Ann RODRIGUEZ Scribe attest that this documentation has been prepared under the direction and in the presence of  Cale Lowe MD.       All medical record entries made by the scribe were personally dictated by me I have reviewed the chart and agree the record accurately reflects my personal performance of his history physical examination and management

## 2024-03-12 NOTE — LETTER
Patient: Daphne Morris  : 1955    Encounter Date: 2024    Chief Complaint:   SNF F/U  Acute on chronic diastolic heart failure  Physical deconditioning/weakness  Acute hypoxic respiratory failure   Pleural effusion  ANDREA  Cardiorenal syndrome     HPI:   68 year-old female presenting to [facility] ER on 24 with shortness of breath, difficulty ambulating, generalized weakness, and BLE edema. Work-up in ER: /63, HR 53, Temp 36.6, RR 20, SpO2 95%, EKG showed sinus bradycardia, CXR showed a new large right pleural effusion and basilar atelectasis, underlying pneumonia is not excluded, CT of chest showed large right pleural effusion with complete collapse of the RLL, atelectasis/consolidation at the RUL and RML, tree-in-bud airspace opacities at the RUL and LLL, and left thyroid gland nodule, UA + glucose and protein, Hgb 9.5, Hct 32.8, Glu 220, K+ 5.4, BUN 49, Cr 3.25, BNP 1,786. Case was discussed with cardiology and nephrology, who recommended IV diuresis. Pt. was started on IV diuretics and IV ATB and admitted to [facility] for further evaluation and treatment. Hospital course:    Acute hypoxic hypercapnic respiratory failure 2/2 CAP/COPD exacerbation/acute on chronic heart failure/large right pleural effusion-2 gm Na+ diet, I&O, daily weights, BiPAP --> supplemental O2, IV lasix, IV solu-medrol, IV Rocephin, IV Azithromycin, duonebs, cardiology consult, pulmonology consult, underwent thoracentesis on  (2300 ml of clear pleural fluid was obtained), TTE showed normal LVSF with 50-56% EF, impaired relaxation pattern of left ventricular diastolic filling, mildly reduced right ventricular systolic function, underwent RHC on  which showed mild pulmonary hypertension, diuretic changed to PO upon discharge, patient to F/U with cardiology after discharge  ANDREA-nephrology consult, renal US negative for left hydronephrosis, cardiorenal syndrome suspected 2/2 acute decompensated CHF, F/U with  nephrologist after discharge (Dr. Mitchell)  Generalized weakness-PT/OT evaluations, recommending SNF   Left knee pain-XR showed small joint effusion and degenerative changes, reticular increased density in the subcutaneous tissues which may represent lymphedema and/or cellulitis, Tylenol prn for pain   AFib/CAD/HLD-s/p Watchman in August 2023, c/w atorvastatin, amiodarone, metoprolol, ASA, plavix, monitored on telemetry   DM2-accuchecks, c/w lantus, ISS, jardiance held   GERD-PPI  Depression-c/w venlafaxine   Constipation-laxatives and stool softeners added    Pt. was HDS and discharged to [facility] on 2/15/24. On 2/18/24, patient was sent to the ER for lethargy and hypoxia. Work-up in ER: D-Dimer 13,011, BUN 58, Cr 2.88, WBC 6.7, Hgb 8.0, Trop 43, BNP 1,444, CXR showed stable pleural and parenchymal opacities in the mid and lower right hemithorax and a mild new left pleural effusion with left basilar atelectasis or edema, SpO2 82% on RA. Pt. was given Lasix 80 mg. US of BLE was negative for DVT. Pt. was placed on BiPAP and transferred to [facility] for further evaluation and treatment. Hospital course:     Acute on chronic hypoxic/hypercapnic respiratory failure 2/2 Influenza B, COPD exacerbation, and decompensated heart failure-BiPAP --> high-flow O2 --> supplemental O2, tamiflu, prednisone, duonebs, diuresis --> dialysis, pulmonology and cardiology consulted   Acute on chronic diastolic CHF-strict I&O, daily weight, 2 gm Na+ diet, c/w metoprolol, cardiology consult   Elevated D-Dimer-US negative for DVT, VQ scan showed low probability of PE  BLE edema/redness-possibly underlying cellulitis, leg elevation, local wound care, IV vancomycin  Acute on chronic anemia-required blood transfusion, iron started, CBC monitored   ANDREA on CKD-nephrology consulted, TDC placed to R chest on 3/1 by general surgery, dialysis started    Pt. was HDS and discharged back to [facility] on 3/5/24. On 3/7, patient was reported  "to have increased BLE edema with fluid-filled blisters and c/o pain. Venous US was ordered, which was negative for DVT. On 3/9, patient was at dialysis and was observed to be confused and c/o dizziness. SpO2 was 66% and she was placed on 5 L of O2. Pt. refused to go to the ER, but eventually agreed to evaluation. Work-up in ER: CXR showed a right basilar opacity, Flu negative, COVID negative, RSV negative. Pt. was discharged back to SNF on Levaquin for pneumonia.     Today, patient reports generalized edema and states that she is \"seeping\" clear fluid from her arms and legs. There are no s/s of infection. She denies any shortness of breath, cough, chest pain or palpitations, fevers, chills, or dizziness. She is c/o BLE pain, that is described as \"burning\". She reports improvement in appetite. Staff report no other clinical concerns at this time.     ROS:    As above in HPI. Otherwise, all other systems have been reviewed and are negative for complaint.    Medications reviewed and verified in NH chart.     Patient Active Problem List   Diagnosis   • Hypothyroidism   • Acute on chronic diastolic heart failure (CMS/HCC)   • Altered mental status   • Anxiety   • Chronic fatigue   • Constipation by delayed colonic transit   • Chronic obstructive pulmonary disease (CMS/HCC)   • Type 2 diabetes mellitus (CMS/HCC)   • Diabetic renal disease (CMS/HCC)   • Gastroesophageal reflux disease   • History of renal cell carcinoma   • Hypertension   • Peripheral vascular disease (CMS/HCC)   • Leukocytosis   • Lung nodule   • Major depressive disorder, single episode, unspecified   • Osteoarthritis   • Paroxysmal atrial fibrillation (CMS/HCC)   • Primary insomnia   • Stage 3b chronic kidney disease (CKD) (CMS/HCC)   • Trouble walking   • Hyperlipidemia, unspecified   • Anemia due to chronic kidney disease, on chronic dialysis (CMS/HCC)   • ANDREA (acute kidney injury) (CMS/HCC)   • ASHD (arteriosclerotic heart disease)   • At risk for " falling   • Essential tremor   • Vitamin D deficiency   • Acute on chronic respiratory failure with hypoxia (CMS/[facility])   • Pulmonary hypertension (CMS/[facility])   • Pulmonary edema   • Influenza B   • History of right nephrectomy   • Cognitive impairment        Past Medical History:   Diagnosis Date   • Anal fissure 10/12/2023   • CVA (cerebral vascular accident) (CMS/[facility])     - right-sided weakness   • Hypoxia    • Impacted cerumen, left ear     Impacted cerumen of left ear   • Noncompliance    • Perianal dermatitis 10/12/2023   • Personal history of other diseases of the respiratory system     History of acute bronchitis   • Renal carcinoma, right (CMS/[facility])     s/p partial nephrectomy 2021   • TIA (transient ischemic attack)    • Vasculitis (CMS/[facility]) 10/12/2023       Past Surgical History:   Procedure Laterality Date   • ANKLE SURGERY         • CARDIAC CATHETERIZATION N/A 2024    Procedure: Right Heart Cath;  Surgeon: Nicholas Antonio MD;  Location: [facility] Cardiac Cath Lab;  Service: Cardiovascular;  Laterality: N/A;   • CATARACT EXTRACTION Right     2019   •  SECTION, CLASSIC     • MR CHEST ANGIO W AND WO IV CONTRAST  2023    MR CHEST ANGIO W AND WO IV CONTRAST 2023 [facility] MRI   • MR HEAD ANGIO WO IV CONTRAST  2021    MR HEAD ANGIO WO IV CONTRAST [facility]   • NEPHRECTOMY  2021   • SHOULDER SURGERY      2009       Family History   Problem Relation Name Age of Onset   • Hypertension Mother     • Diabetes Father     • Heart disease Father     • Cancer Sister     • Cancer Brother     • Diabetes Daughter     • Asthma Daughter     • Heart attack Daughter     • Diabetes Maternal Grandfather     • Heart disease Paternal Grandmother     • Diabetes Other     • Hypertension Other     • COPD Other         Social History     Tobacco Use   Smoking Status Former   • Packs/day: 0.50   • Years: 50.00   • Additional pack years: 0.00   • Total pack years: 25.00   • Types:  Cigarettes   • Start date: 1/1/1969   • Passive exposure: Never   Smokeless Tobacco Never       Social History     Substance and Sexual Activity   Alcohol Use Not Currently       Social History     Substance and Sexual Activity   Drug Use Yes   • Types: Marijuana       Allergies   Allergen Reactions   • Citalopram Other, Rash and Hives     Facial breakout, blisters, and rash   • Adhesive Tape-Silicones Other     blisters   • Amoxicillin Swelling   • Bee Venom Protein (Honey Bee) Swelling   • Codeine Headache and Other     Migraines   • Latex Other     blisters   • Lisinopril Swelling     Facial swelling   • Prednisone Other     Yeast infection   • Tuberculin Ppd Unknown   • Doxycycline Rash   • Oseltamivir Rash   • Phenyleph-Shark Oil-Glyc-Pet Rash   • Phenylephrine Nausea/vomiting        Vital Signs:   135/77-77-18-98.9-93% on RA     Physical Exam:  General: Sitting up in WC in NAD, alert   Head/Face: NCAT, symmetrical  Eyes: PERRLA, no injection, no discharge  ENT: Hearing not impaired, ears without scars or lesions, nasal mucosa and turbinates pink, septum midline, lips pink and moist  Neck: Supple, symmetrical  Respiratory: CTA but diminished with faint rales noted to RLL, respirations even and nonlabored without use of accessory muscles, good air exchange  Cardio: RRR without murmur or gallops, normal S1S2, + anasarca, BLE NP edema, pedal pulses 2+/4 bilaterally  Chest/Breast: Symmetrical, tunneled dialysis cath to R chest   GI: BS x 4, normoactive, non-distended, abd round and soft, no masses or tenderness  : No suprapubic tenderness or distention  MSK: Gait not assessed, joints with full ROM without pain or contractures, + generalized weakness  Skin: Skin warm and dry, no induration, + clear drainage noted from BUE and BLE, tubi- intact to BLE  Neurologic: Cranial nerves II through XII intact, superficial touch and pain sensation intact  Psychiatric: Alert to person, place, and time, calm and  cooperative     Results/Data:   3/9/24: Glu 95, K+ 3.4, CO2 33, BUN 7, Cr 0.84, GFR 75, Kel 7.6, Alb 2.6, Hgb 7.9, Hct 27.0  3/6/24: HgbA1C 7.7, Chol 147, , HDL 65, LDL 51    Assessment/Plan:  Acute on chronic respiratory failure with hypoxia and hypercapnia 2/2 pneumonia/COPD exacerbation/acute on chronic HFpEF/right pleural effusion-c/w supplemental O2 as needed, 2 gm Na+ diet, monitor weights, 1500 ml fluid restriction, s/p thoracentesis (2/2/24) with 2.3 L of fluid removed, c/w dialysis as scheduled, c/w Stefanie (end date 3/18), mikaela prn, monitor respiratory status closely  Physical deconditioning/generalized weakness-c/w PT/OT, safety and fall precautions   ANDREA on CKD-in setting of acute decompensated heart failure, avoid nephrotoxic drugs, c/w dialysis, monitor R chest dialysis catheter site, monitor renal function, F/U with nephrologist as scheduled  AFib/CAD/HTN/HLD-s/p Watchman procedure (Aug. 2023), s/p RHC (2/27/24) which showed mild pulmonary hypertension, c/w amiodarone, aspirin, atorvastatin, metoprolol, and plavix, monitor BP and HR, F/U with cardiologist as scheduled  Hypokalemia-monitor, replete as needed  Anemia-c/w iron supplement, monitor CBC, transfuse for Hgb less than 7.0  GERD-c/w PPI  Depression-c/w venlafaxine (increased to 150 mg daily on 3/11 per psych), supportive care, Psych following  DM2-LCS diet, accuchecks, c/w humalog ISS with meals, c/w lantus, BP and lipid control, yearly eye exam, diabetic foot care, monitor HgbA1C  BLE edema/pain-elevate as tolerated, tubi- to BLE, local wound care, start on Tylenol ES 1000 mg BID, consider gabapentin for neuropathy, monitor closely  Influenza B-RESOLVED, positive on 2/19/24, treated with Tamiflu  Hypoalbuminemia-increase protein intake, monitor albumin level, RD consult  Hypothyroidism/left thyroid nodule-c/w levothyroxine, monitor TSH and FT4, F/U with PCP after discharge for thyroid ultrasound    Orders:  Dietician consult for  third spacing/low protein   Tylenol ES 1000 mg PO BID    Code Status:   Full Code    Time spent reviewing patient's chart on the unit (PMH, PSH, FH, Social Hx AND progress and/or consult notes, labs, and radiology results): 50 minutes  Time spent interviewing and/or examining the patient: 20 minutes  Time spent writing note on the unit: 15 minutes  Time spent reviewing POC w/ patient, family, and/or staff: 20 minutes  Total visit time: 105 minutes. Greater than 50% of time was spent on counseling and/or coordination of care of the patient. Start time: 2:03 PM, End time: 3:48 PM.        Electronically Signed By: GREGORIA Olivares   3/30/24  2:06 PM

## 2024-03-13 ENCOUNTER — LAB (OUTPATIENT)
Dept: LAB | Facility: LAB | Age: 69
End: 2024-03-13
Payer: MEDICARE

## 2024-03-13 DIAGNOSIS — Z99.2 ANEMIA DUE TO CHRONIC KIDNEY DISEASE, ON CHRONIC DIALYSIS (MULTI): ICD-10-CM

## 2024-03-13 DIAGNOSIS — N18.6 ANEMIA DUE TO CHRONIC KIDNEY DISEASE, ON CHRONIC DIALYSIS (MULTI): Primary | ICD-10-CM

## 2024-03-13 DIAGNOSIS — Z99.2 ANEMIA DUE TO CHRONIC KIDNEY DISEASE, ON CHRONIC DIALYSIS (MULTI): Primary | ICD-10-CM

## 2024-03-13 DIAGNOSIS — N18.6 ANEMIA DUE TO CHRONIC KIDNEY DISEASE, ON CHRONIC DIALYSIS (MULTI): ICD-10-CM

## 2024-03-13 DIAGNOSIS — D63.1 ANEMIA DUE TO CHRONIC KIDNEY DISEASE, ON CHRONIC DIALYSIS (MULTI): ICD-10-CM

## 2024-03-13 DIAGNOSIS — D63.1 ANEMIA DUE TO CHRONIC KIDNEY DISEASE, ON CHRONIC DIALYSIS (MULTI): Primary | ICD-10-CM

## 2024-03-13 LAB
ABO GROUP (TYPE) IN BLOOD: NORMAL
ANTIBODY SCREEN: NORMAL
BASOPHILS # BLD AUTO: 0.02 X10*3/UL (ref 0–0.1)
BASOPHILS NFR BLD AUTO: 0.3 %
EOSINOPHIL # BLD AUTO: 0.02 X10*3/UL (ref 0–0.7)
EOSINOPHIL NFR BLD AUTO: 0.3 %
ERYTHROCYTE [DISTWIDTH] IN BLOOD BY AUTOMATED COUNT: 15.8 % (ref 11.5–14.5)
HCT VFR BLD AUTO: 24.2 % (ref 36–46)
HGB BLD-MCNC: 6.8 G/DL (ref 12–16)
IMM GRANULOCYTES # BLD AUTO: 0.12 X10*3/UL (ref 0–0.7)
IMM GRANULOCYTES NFR BLD AUTO: 1.7 % (ref 0–0.9)
LYMPHOCYTES # BLD AUTO: 1.08 X10*3/UL (ref 1.2–4.8)
LYMPHOCYTES NFR BLD AUTO: 15.7 %
MCH RBC QN AUTO: 30 PG (ref 26–34)
MCHC RBC AUTO-ENTMCNC: 28.1 G/DL (ref 32–36)
MCV RBC AUTO: 107 FL (ref 80–100)
MONOCYTES # BLD AUTO: 0.71 X10*3/UL (ref 0.1–1)
MONOCYTES NFR BLD AUTO: 10.3 %
NEUTROPHILS # BLD AUTO: 4.92 X10*3/UL (ref 1.2–7.7)
NEUTROPHILS NFR BLD AUTO: 71.7 %
NRBC BLD-RTO: 0.4 /100 WBCS (ref 0–0)
PLATELET # BLD AUTO: 165 X10*3/UL (ref 150–450)
RBC # BLD AUTO: 2.27 X10*6/UL (ref 4–5.2)
RH FACTOR (ANTIGEN D): NORMAL
WBC # BLD AUTO: 6.9 X10*3/UL (ref 4.4–11.3)

## 2024-03-13 NOTE — ED PROVIDER NOTES
Chief Complaint: Hypoxia   HPI: this is a 68-year-old female, past medical history significant for Chronic kidney disease currently on dialysis, diabetes, hypertension, CHF, presenting to the emergency department from the dialysis center for hypoxia.  Per nursing home prior to dialysis today, the patient was sitting low on 6 L.  She was sent to dialysis where she completed a full round of dialysis and her oxygen requirement went back down to her home 3 L of oxygen, however they sent her to the emergency department for further evaluation.  Patient states that she was initially short of breath, however the shortness of breath is significantly improved.  She denies any chest pain, fevers, chills.    Past Medical History:   Diagnosis Date    Acute CHF (CMS/[facility])     04/2023    ANDREA (acute kidney injury) (CMS/[facility])     Anemia     Arthritis     ASHD (arteriosclerotic heart disease)     At risk for falling     Atrial fibrillation (CMS/[facility])     Cancer of kidney (CMS/[facility])     Chronic diastolic CHF (congestive heart failure) (CMS/[facility])     CKD (chronic kidney disease)     Class 2 obesity with body mass index (BMI) of 35.0 to 35.9 in adult 02/19/2024    35.26 kg/m²    COPD (chronic obstructive pulmonary disease) (CMS/[facility])     CVA (cerebral vascular accident) (CMS/[facility])     2021- righ sided weakness    Depression     Diabetes (CMS/[facility])     End-stage renal disease on hemodialysis (CMS/[facility])     Essential tremor     GERD (gastroesophageal reflux disease)     HTN (hypertension)     Hyperlipidemia     Hypothyroidism     s/p radioactive iodine treatment    Hypoxia     Impacted cerumen, left ear     Impacted cerumen of left ear    Influenza B     Left ventricular ejection fraction greater than 40%     40-45%    Noncompliance     Personal history of other diseases of the respiratory system     History of acute bronchitis    Pulmonary edema     Pulmonary hypertension (CMS/[facility])     PVD (peripheral vascular disease) (CMS/[facility])     Renal  carcinoma, right (CMS/HCC)     s/p partial nephrectomy 2021    Respiratory failure (CMS/HCC)     Stage 3b chronic kidney disease (CKD) (CMS/HCC)     Vitamin D deficiency     Weakness       Past Surgical History:   Procedure Laterality Date    ANKLE SURGERY          CARDIAC CATHETERIZATION N/A 2024    Procedure: Right Heart Cath;  Surgeon: Nicholas Antonio MD;  Location: [facility] Cardiac Cath Lab;  Service: Cardiovascular;  Laterality: N/A;    CATARACT EXTRACTION Right     2019     SECTION, CLASSIC      MR CHEST ANGIO W AND WO IV CONTRAST  2023    MR CHEST ANGIO W AND WO IV CONTRAST 2023 [facility] MRI    MR HEAD ANGIO WO IV CONTRAST  2021    MR HEAD ANGIO WO IV CONTRAST [facility]    NEPHRECTOMY  2021    SHOULDER SURGERY             Physical Exam  Constitutional:       Appearance: Normal appearance.      Comments: Chronically ill appearing    HENT:      Head: Normocephalic and atraumatic.      Mouth/Throat:      Mouth: Mucous membranes are moist.   Eyes:      Conjunctiva/sclera: Conjunctivae normal.   Cardiovascular:      Rate and Rhythm: Normal rate.   Pulmonary:      Effort: Pulmonary effort is normal.      Breath sounds: Decreased breath sounds and rhonchi present.   Abdominal:      General: Abdomen is flat.      Palpations: Abdomen is soft.   Musculoskeletal:         General: Normal range of motion.      Cervical back: Normal range of motion.   Skin:     General: Skin is warm and dry.   Neurological:      General: No focal deficit present.      Mental Status: She is alert.   Psychiatric:         Mood and Affect: Mood normal.            ED Course/MDM  Diagnoses as of 24 0522   Pneumonia of right lower lobe due to infectious organism     EKG interpreted by myself (ED attending physician): Sinus rhythm with PVCs and PACs, very poor baseline makes further interpretation of the EKG difficult, apart from the rate of 68.  On review of previous EKGs, the  patient has had somewhat similar appearing EKGs in the past.  No ischemia noted    This is a 68 y.o. female presenting to the ED for evaluation of hypoxia which occurred prior to arrival.  Patient has a history of CHF, as well as end-stage renal disease on dialysis.  She is on 3 L of oxygen via nasal cannula at baseline.  Nursing reports that the patient was requiring 6 L to stay above 80%, however after her dialysis treatment she returned back to her home 3 L of oxygen but was sent to the emergency department for further evaluation.  Lab work was concerning for an anemia, however on review, the hemoglobin is at its baseline.  Flu, COVID, RSV are negative.  Chest x-ray shows a right basilar opacity.  As the patient is otherwise hemodynamically stable and is back to her baseline oxygen requirement, not complaining of any chest pain, I do feel that she is safe and stable for discharge home and continued outpatient follow-up.  She is going to be treated for a pneumonia with Levaquin given her allergies.  She was advised to return to the emergency department for any worsening symptoms and was ultimately discharged home in stable condition.    Final Impression  1.  Pneumonia  Disposition/Plan: Discharge home  Condition at disposition: Stable.     Deanna Stearns DO  Emergency Medicine Physician     Deanna Stearns,   03/13/24 0527       Deanna Stearns,   04/06/24 0655

## 2024-03-14 ENCOUNTER — HOSPITAL ENCOUNTER (OUTPATIENT)
Dept: CARDIOLOGY | Facility: HOSPITAL | Age: 69
Discharge: HOME | End: 2024-03-14
Payer: MEDICARE

## 2024-03-14 ENCOUNTER — APPOINTMENT (OUTPATIENT)
Dept: HEMATOLOGY/ONCOLOGY | Facility: HOSPITAL | Age: 69
End: 2024-03-14
Payer: MEDICARE

## 2024-03-14 ENCOUNTER — HOSPITAL ENCOUNTER (EMERGENCY)
Facility: HOSPITAL | Age: 69
Discharge: HOME | End: 2024-03-14
Attending: STUDENT IN AN ORGANIZED HEALTH CARE EDUCATION/TRAINING PROGRAM
Payer: MEDICARE

## 2024-03-14 ENCOUNTER — APPOINTMENT (OUTPATIENT)
Dept: CARDIOLOGY | Facility: HOSPITAL | Age: 69
End: 2024-03-14
Payer: MEDICARE

## 2024-03-14 VITALS
TEMPERATURE: 97.4 F | RESPIRATION RATE: 25 BRPM | DIASTOLIC BLOOD PRESSURE: 79 MMHG | WEIGHT: 191.8 LBS | HEART RATE: 60 BPM | OXYGEN SATURATION: 94 % | HEIGHT: 67 IN | SYSTOLIC BLOOD PRESSURE: 153 MMHG | BODY MASS INDEX: 30.1 KG/M2

## 2024-03-14 DIAGNOSIS — Z99.2 ANEMIA DUE TO CHRONIC KIDNEY DISEASE, ON CHRONIC DIALYSIS (MULTI): Primary | ICD-10-CM

## 2024-03-14 DIAGNOSIS — D64.9 ANEMIA, UNSPECIFIED TYPE: Primary | ICD-10-CM

## 2024-03-14 DIAGNOSIS — Z99.2 DIALYSIS PATIENT (CMS-HCC): ICD-10-CM

## 2024-03-14 DIAGNOSIS — D63.1 ANEMIA DUE TO CHRONIC KIDNEY DISEASE, ON CHRONIC DIALYSIS (MULTI): Primary | ICD-10-CM

## 2024-03-14 DIAGNOSIS — N18.6 ANEMIA DUE TO CHRONIC KIDNEY DISEASE, ON CHRONIC DIALYSIS (MULTI): Primary | ICD-10-CM

## 2024-03-14 LAB
ABO GROUP (TYPE) IN BLOOD: NORMAL
ALBUMIN SERPL BCP-MCNC: 2.7 G/DL (ref 3.4–5)
ALP SERPL-CCNC: 75 U/L (ref 33–136)
ALT SERPL W P-5'-P-CCNC: 12 U/L (ref 7–45)
ANION GAP SERPL CALC-SCNC: 10 MMOL/L (ref 10–20)
ANTIBODY SCREEN: NORMAL
AST SERPL W P-5'-P-CCNC: 18 U/L (ref 9–39)
BASOPHILS # BLD AUTO: 0.02 X10*3/UL (ref 0–0.1)
BASOPHILS NFR BLD AUTO: 0.3 %
BILIRUB SERPL-MCNC: 0.3 MG/DL (ref 0–1.2)
BUN SERPL-MCNC: 21 MG/DL (ref 6–23)
CALCIUM SERPL-MCNC: 7.6 MG/DL (ref 8.6–10.3)
CHLORIDE SERPL-SCNC: 101 MMOL/L (ref 98–107)
CO2 SERPL-SCNC: 32 MMOL/L (ref 21–32)
CREAT SERPL-MCNC: 2.2 MG/DL (ref 0.5–1.05)
EGFRCR SERPLBLD CKD-EPI 2021: 24 ML/MIN/1.73M*2
EOSINOPHIL # BLD AUTO: 0.03 X10*3/UL (ref 0–0.7)
EOSINOPHIL NFR BLD AUTO: 0.4 %
ERYTHROCYTE [DISTWIDTH] IN BLOOD BY AUTOMATED COUNT: 16 % (ref 11.5–14.5)
GLUCOSE SERPL-MCNC: 24 MG/DL (ref 74–99)
HCT VFR BLD AUTO: 23.1 % (ref 36–46)
HGB BLD-MCNC: 6.9 G/DL (ref 12–16)
IMM GRANULOCYTES # BLD AUTO: 0.08 X10*3/UL (ref 0–0.7)
IMM GRANULOCYTES NFR BLD AUTO: 1.1 % (ref 0–0.9)
INR PPP: 1 (ref 0.9–1.1)
LYMPHOCYTES # BLD AUTO: 1.02 X10*3/UL (ref 1.2–4.8)
LYMPHOCYTES NFR BLD AUTO: 14 %
MCH RBC QN AUTO: 29.7 PG (ref 26–34)
MCHC RBC AUTO-ENTMCNC: 29.9 G/DL (ref 32–36)
MCV RBC AUTO: 100 FL (ref 80–100)
MONOCYTES # BLD AUTO: 0.69 X10*3/UL (ref 0.1–1)
MONOCYTES NFR BLD AUTO: 9.5 %
NEUTROPHILS # BLD AUTO: 5.43 X10*3/UL (ref 1.2–7.7)
NEUTROPHILS NFR BLD AUTO: 74.7 %
NRBC BLD-RTO: 0.4 /100 WBCS (ref 0–0)
PLATELET # BLD AUTO: 178 X10*3/UL (ref 150–450)
POTASSIUM SERPL-SCNC: 3.3 MMOL/L (ref 3.5–5.3)
PROT SERPL-MCNC: 5.2 G/DL (ref 6.4–8.2)
PROTHROMBIN TIME: 11.2 SECONDS (ref 9.8–12.8)
RBC # BLD AUTO: 2.32 X10*6/UL (ref 4–5.2)
RH FACTOR (ANTIGEN D): NORMAL
SODIUM SERPL-SCNC: 140 MMOL/L (ref 136–145)
WBC # BLD AUTO: 7.3 X10*3/UL (ref 4.4–11.3)

## 2024-03-14 PROCEDURE — 80053 COMPREHEN METABOLIC PANEL: CPT | Performed by: STUDENT IN AN ORGANIZED HEALTH CARE EDUCATION/TRAINING PROGRAM

## 2024-03-14 PROCEDURE — 85610 PROTHROMBIN TIME: CPT | Performed by: STUDENT IN AN ORGANIZED HEALTH CARE EDUCATION/TRAINING PROGRAM

## 2024-03-14 PROCEDURE — 86920 COMPATIBILITY TEST SPIN: CPT

## 2024-03-14 PROCEDURE — 85025 COMPLETE CBC W/AUTO DIFF WBC: CPT | Performed by: STUDENT IN AN ORGANIZED HEALTH CARE EDUCATION/TRAINING PROGRAM

## 2024-03-14 PROCEDURE — 99283 EMERGENCY DEPT VISIT LOW MDM: CPT | Mod: 25

## 2024-03-14 PROCEDURE — 93005 ELECTROCARDIOGRAM TRACING: CPT

## 2024-03-14 PROCEDURE — 86901 BLOOD TYPING SEROLOGIC RH(D): CPT | Performed by: STUDENT IN AN ORGANIZED HEALTH CARE EDUCATION/TRAINING PROGRAM

## 2024-03-14 RX ORDER — EPINEPHRINE 0.3 MG/.3ML
0.3 INJECTION SUBCUTANEOUS EVERY 5 MIN PRN
Status: CANCELLED | OUTPATIENT
Start: 2024-03-15

## 2024-03-14 RX ORDER — HEPARIN SODIUM,PORCINE/PF 10 UNIT/ML
50 SYRINGE (ML) INTRAVENOUS AS NEEDED
Status: CANCELLED | OUTPATIENT
Start: 2024-03-15

## 2024-03-14 RX ORDER — DIPHENHYDRAMINE HYDROCHLORIDE 50 MG/ML
50 INJECTION INTRAMUSCULAR; INTRAVENOUS AS NEEDED
Status: CANCELLED | OUTPATIENT
Start: 2024-03-15

## 2024-03-14 RX ORDER — HEPARIN 100 UNIT/ML
500 SYRINGE INTRAVENOUS AS NEEDED
Status: CANCELLED | OUTPATIENT
Start: 2024-03-15

## 2024-03-14 RX ORDER — FAMOTIDINE 10 MG/ML
20 INJECTION INTRAVENOUS ONCE AS NEEDED
Status: CANCELLED | OUTPATIENT
Start: 2024-03-15

## 2024-03-14 RX ORDER — ALBUTEROL SULFATE 0.83 MG/ML
3 SOLUTION RESPIRATORY (INHALATION) AS NEEDED
Status: CANCELLED | OUTPATIENT
Start: 2024-03-15

## 2024-03-14 RX ORDER — FUROSEMIDE 10 MG/ML
20 INJECTION INTRAMUSCULAR; INTRAVENOUS ONCE
Status: CANCELLED
Start: 2024-03-16 | End: 2024-03-16

## 2024-03-14 ASSESSMENT — PAIN - FUNCTIONAL ASSESSMENT: PAIN_FUNCTIONAL_ASSESSMENT: 0-10

## 2024-03-14 ASSESSMENT — PAIN DESCRIPTION - LOCATION: LOCATION: NECK

## 2024-03-14 ASSESSMENT — COLUMBIA-SUICIDE SEVERITY RATING SCALE - C-SSRS
1. IN THE PAST MONTH, HAVE YOU WISHED YOU WERE DEAD OR WISHED YOU COULD GO TO SLEEP AND NOT WAKE UP?: NO
2. HAVE YOU ACTUALLY HAD ANY THOUGHTS OF KILLING YOURSELF?: NO
6. HAVE YOU EVER DONE ANYTHING, STARTED TO DO ANYTHING, OR PREPARED TO DO ANYTHING TO END YOUR LIFE?: NO

## 2024-03-14 ASSESSMENT — PAIN DESCRIPTION - ORIENTATION: ORIENTATION: POSTERIOR

## 2024-03-14 ASSESSMENT — PAIN SCALES - GENERAL: PAINLEVEL_OUTOF10: 3

## 2024-03-14 ASSESSMENT — PAIN DESCRIPTION - PAIN TYPE: TYPE: CHRONIC PAIN

## 2024-03-14 NOTE — ED TRIAGE NOTES
Pt arrived to ED from [facility] with c/o hemoglobin 6.8 for abnormal lab work. Pt is a dialysis pt on a T/Th/Sat schedule. Per SNF staff, pt was hypotensive.

## 2024-03-14 NOTE — ED NOTES
Ji with pt's ED transport to [facility] dialysis treatment [place] will transport pt from ED to dialysis after obtaining pt's chair for transport.      Deanna Magana RN  03/14/24 0605

## 2024-03-14 NOTE — ED NOTES
Pt's BP WNL; writer called [facility] to confirm that pt receives dialysis at [facility] on T/TH/Sat schedule. Writer received confirmation that pt's scheduled transport has not arrived at [facility] as of 0610. Writer requested that [facility] staff divert transport to ED so that pt can be picked up from ED for dialysis this morning. Dr. Stearns is updated and aware.          Deanna Magana RN  03/14/24 [id number]

## 2024-03-15 ENCOUNTER — INFUSION (OUTPATIENT)
Dept: HEMATOLOGY/ONCOLOGY | Facility: HOSPITAL | Age: 69
End: 2024-03-15
Payer: MEDICARE

## 2024-03-15 VITALS
DIASTOLIC BLOOD PRESSURE: 77 MMHG | RESPIRATION RATE: 20 BRPM | HEART RATE: 74 BPM | TEMPERATURE: 97.9 F | SYSTOLIC BLOOD PRESSURE: 166 MMHG | OXYGEN SATURATION: 98 %

## 2024-03-15 DIAGNOSIS — D63.1 ANEMIA DUE TO CHRONIC KIDNEY DISEASE, ON CHRONIC DIALYSIS (MULTI): ICD-10-CM

## 2024-03-15 DIAGNOSIS — Z99.2 ANEMIA DUE TO CHRONIC KIDNEY DISEASE, ON CHRONIC DIALYSIS (MULTI): ICD-10-CM

## 2024-03-15 DIAGNOSIS — N18.6 ANEMIA DUE TO CHRONIC KIDNEY DISEASE, ON CHRONIC DIALYSIS (MULTI): ICD-10-CM

## 2024-03-15 DIAGNOSIS — Z79.4 TYPE 2 DIABETES MELLITUS WITH HYPERGLYCEMIA, WITH LONG-TERM CURRENT USE OF INSULIN (MULTI): Primary | ICD-10-CM

## 2024-03-15 DIAGNOSIS — E11.65 TYPE 2 DIABETES MELLITUS WITH HYPERGLYCEMIA, WITH LONG-TERM CURRENT USE OF INSULIN (MULTI): Primary | ICD-10-CM

## 2024-03-15 LAB
BLOOD EXPIRATION DATE: NORMAL
DISPENSE STATUS: NORMAL
PRODUCT BLOOD TYPE: 9500
PRODUCT CODE: NORMAL
UNIT ABO: NORMAL
UNIT NUMBER: NORMAL
UNIT RH: NORMAL
UNIT VOLUME: 350
XM INTEP: NORMAL

## 2024-03-15 PROCEDURE — 36593 DECLOT VASCULAR DEVICE: CPT

## 2024-03-15 PROCEDURE — P9016 RBC LEUKOCYTES REDUCED: HCPCS

## 2024-03-15 PROCEDURE — 96374 THER/PROPH/DIAG INJ IV PUSH: CPT | Mod: INF,INF2

## 2024-03-15 PROCEDURE — 36430 TRANSFUSION BLD/BLD COMPNT: CPT

## 2024-03-15 PROCEDURE — 2500000004 HC RX 250 GENERAL PHARMACY W/ HCPCS (ALT 636 FOR OP/ED): Mod: JZ,TB | Performed by: NURSE PRACTITIONER

## 2024-03-15 RX ORDER — FUROSEMIDE 10 MG/ML
20 INJECTION INTRAMUSCULAR; INTRAVENOUS ONCE
Status: COMPLETED | OUTPATIENT
Start: 2024-03-16 | End: 2024-03-15

## 2024-03-15 RX ORDER — DIPHENHYDRAMINE HYDROCHLORIDE 50 MG/ML
50 INJECTION INTRAMUSCULAR; INTRAVENOUS AS NEEDED
OUTPATIENT
Start: 2024-03-15

## 2024-03-15 RX ORDER — FAMOTIDINE 10 MG/ML
20 INJECTION INTRAVENOUS ONCE AS NEEDED
OUTPATIENT
Start: 2024-03-15

## 2024-03-15 RX ORDER — FUROSEMIDE 10 MG/ML
20 INJECTION INTRAMUSCULAR; INTRAVENOUS ONCE
Status: CANCELLED
Start: 2024-03-16 | End: 2024-03-16

## 2024-03-15 RX ORDER — BLOOD-GLUCOSE SENSOR
EACH MISCELLANEOUS
Qty: 2 EACH | Refills: 11 | Status: SHIPPED | OUTPATIENT
Start: 2024-03-15

## 2024-03-15 RX ORDER — BLOOD-GLUCOSE,RECEIVER,CONT
EACH MISCELLANEOUS
Qty: 1 EACH | Refills: 0 | Status: SHIPPED | OUTPATIENT
Start: 2024-03-15

## 2024-03-15 RX ORDER — EPINEPHRINE 0.3 MG/.3ML
0.3 INJECTION SUBCUTANEOUS EVERY 5 MIN PRN
OUTPATIENT
Start: 2024-03-15

## 2024-03-15 RX ORDER — HEPARIN SODIUM,PORCINE/PF 10 UNIT/ML
50 SYRINGE (ML) INTRAVENOUS AS NEEDED
OUTPATIENT
Start: 2024-03-15

## 2024-03-15 RX ORDER — ALBUTEROL SULFATE 0.83 MG/ML
3 SOLUTION RESPIRATORY (INHALATION) AS NEEDED
OUTPATIENT
Start: 2024-03-15

## 2024-03-15 RX ORDER — HEPARIN 100 UNIT/ML
500 SYRINGE INTRAVENOUS AS NEEDED
OUTPATIENT
Start: 2024-03-15

## 2024-03-15 RX ORDER — HEPARIN SODIUM,PORCINE/PF 10 UNIT/ML
50 SYRINGE (ML) INTRAVENOUS AS NEEDED
Status: DISCONTINUED | OUTPATIENT
Start: 2024-03-15 | End: 2024-03-15 | Stop reason: HOSPADM

## 2024-03-15 RX ADMIN — FUROSEMIDE 20 MG: 10 INJECTION, SOLUTION INTRAVENOUS at 11:35

## 2024-03-15 RX ADMIN — Medication 50 UNITS: at 11:35

## 2024-03-15 RX ADMIN — ALTEPLASE 2 MG: 2.2 INJECTION, POWDER, LYOPHILIZED, FOR SOLUTION INTRAVENOUS at 08:23

## 2024-03-15 ASSESSMENT — PAIN SCALES - GENERAL: PAINLEVEL: 9

## 2024-03-15 NOTE — PROGRESS NOTES
0915-OK to use Left upper arm picc line, no blood return noted, flushes with ease.  Pt denies pain when flushing.

## 2024-03-15 NOTE — TELEPHONE ENCOUNTER
Patient is in Skilled Nursing facility.  SANTHOSH Camacho aware to contact skilled nursing facility for refill.  Pharmacy aware patient in skilled nursing facility.  No fill at this time.

## 2024-03-15 NOTE — TELEPHONE ENCOUNTER
Patient emergency contact Doug Feliciano calling to inquire why patient freestyle vicente not filled as they have requested x3 Patient in Skilled Nursing, no fills at this time. Karo Ely made aware via phone call from this nurse.

## 2024-03-16 LAB
ATRIAL RATE: 64 BPM
ATRIAL RATE: 65 BPM
P AXIS: 87 DEGREES
P AXIS: 99 DEGREES
P OFFSET: 151 MS
P OFFSET: 163 MS
P ONSET: 121 MS
P ONSET: 125 MS
PR INTERVAL: 178 MS
PR INTERVAL: 186 MS
Q ONSET: 214 MS
Q ONSET: 214 MS
QRS COUNT: 10 BEATS
QRS COUNT: 11 BEATS
QRS DURATION: 126 MS
QRS DURATION: 130 MS
QT INTERVAL: 494 MS
QT INTERVAL: 502 MS
QTC CALCULATION(BAZETT): 513 MS
QTC CALCULATION(BAZETT): 517 MS
QTC FREDERICIA: 507 MS
QTC FREDERICIA: 512 MS
R AXIS: -43 DEGREES
R AXIS: -43 DEGREES
T AXIS: 107 DEGREES
T AXIS: 112 DEGREES
T OFFSET: 461 MS
T OFFSET: 465 MS
VENTRICULAR RATE: 64 BPM
VENTRICULAR RATE: 65 BPM

## 2024-03-17 ENCOUNTER — NURSING HOME VISIT (OUTPATIENT)
Dept: POST ACUTE CARE | Facility: EXTERNAL LOCATION | Age: 69
End: 2024-03-17
Payer: MEDICARE

## 2024-03-17 DIAGNOSIS — J44.9 COPD ON LONG-TERM INHALED STEROID THERAPY (MULTI): Primary | ICD-10-CM

## 2024-03-17 DIAGNOSIS — Z91.81 AT RISK FOR FALLING: ICD-10-CM

## 2024-03-17 DIAGNOSIS — I50.9 CONGESTIVE HEART FAILURE, UNSPECIFIED HF CHRONICITY, UNSPECIFIED HEART FAILURE TYPE (MULTI): ICD-10-CM

## 2024-03-17 DIAGNOSIS — I10 HYPERTENSION, UNSPECIFIED TYPE: ICD-10-CM

## 2024-03-17 DIAGNOSIS — Z79.51 COPD ON LONG-TERM INHALED STEROID THERAPY (MULTI): Primary | ICD-10-CM

## 2024-03-17 DIAGNOSIS — N18.6 ESRD (END STAGE RENAL DISEASE) (MULTI): ICD-10-CM

## 2024-03-17 DIAGNOSIS — I63.9 CEREBROVASCULAR ACCIDENT (CVA), UNSPECIFIED MECHANISM (MULTI): ICD-10-CM

## 2024-03-17 DIAGNOSIS — D64.9 ANEMIA, UNSPECIFIED TYPE: ICD-10-CM

## 2024-03-17 DIAGNOSIS — J96.90 RESPIRATORY FAILURE, UNSPECIFIED CHRONICITY, UNSPECIFIED WHETHER WITH HYPOXIA OR HYPERCAPNIA (MULTI): ICD-10-CM

## 2024-03-17 DIAGNOSIS — Z79.4 TYPE 2 DIABETES MELLITUS WITHOUT COMPLICATION, WITH LONG-TERM CURRENT USE OF INSULIN (MULTI): ICD-10-CM

## 2024-03-17 DIAGNOSIS — E03.9 HYPOTHYROIDISM, UNSPECIFIED TYPE: ICD-10-CM

## 2024-03-17 DIAGNOSIS — I48.91 ATRIAL FIBRILLATION, UNSPECIFIED TYPE (MULTI): ICD-10-CM

## 2024-03-17 DIAGNOSIS — K21.9 GASTROESOPHAGEAL REFLUX DISEASE WITHOUT ESOPHAGITIS: ICD-10-CM

## 2024-03-17 DIAGNOSIS — E11.9 TYPE 2 DIABETES MELLITUS WITHOUT COMPLICATION, WITH LONG-TERM CURRENT USE OF INSULIN (MULTI): ICD-10-CM

## 2024-03-17 DIAGNOSIS — F32.A DEPRESSION, UNSPECIFIED DEPRESSION TYPE: ICD-10-CM

## 2024-03-17 DIAGNOSIS — E78.5 HYPERLIPIDEMIA, UNSPECIFIED HYPERLIPIDEMIA TYPE: ICD-10-CM

## 2024-03-17 DIAGNOSIS — R53.1 WEAKNESS: ICD-10-CM

## 2024-03-17 PROCEDURE — 99309 SBSQ NF CARE MODERATE MDM 30: CPT | Performed by: INTERNAL MEDICINE

## 2024-03-17 RX ORDER — FLASH GLUCOSE SENSOR
KIT MISCELLANEOUS
Refills: 0 | OUTPATIENT
Start: 2024-03-17

## 2024-03-17 NOTE — LETTER
Patient: Daphne Morris  : 1955    Encounter Date: 2024    PLACE OF SERVICE:  [facility]    This is a subsequent visit.    Subjective  Patient ID: Daphne Morris is a 68 y.o. female who presents for Follow-up.    Ms. Cecelia Morris is a 68-year-old female with history of end-stage renal disease, on hemodialysis.  She suffers from heart failure as well as COPD.  She has hypoxia and is oxygen dependent.  She requires supportive care.    Review of Systems   Constitutional:  Negative for chills and fever.   Cardiovascular:  Negative for chest pain.   All other systems reviewed and are negative.    Objective  /82   Pulse 86   Temp 36.6 °C (97.9 °F)   Resp 18     Physical Exam  Vitals reviewed.   Constitutional:       Comments: This is a well-developed, well-nourished female, lying in bed, appearing weak   HENT:      Right Ear: Tympanic membrane, ear canal and external ear normal.      Left Ear: Tympanic membrane, ear canal and external ear normal.   Eyes:      General: No scleral icterus.     Pupils: Pupils are equal, round, and reactive to light.   Neck:      Vascular: No carotid bruit.   Cardiovascular:      Heart sounds: Normal heart sounds, S1 normal and S2 normal. No murmur heard.     No friction rub.   Pulmonary:      Breath sounds: Decreased breath sounds (throughout) present.   Abdominal:      Palpations: There is no hepatomegaly, splenomegaly or mass.   Musculoskeletal:         General: No swelling or deformity. Normal range of motion.      Cervical back: Neck supple.      Right lower leg: Edema present.      Left lower leg: Edema present.   Lymphadenopathy:      Cervical: No cervical adenopathy.      Upper Body:      Right upper body: No axillary adenopathy.      Left upper body: No axillary adenopathy.      Lower Body: No right inguinal adenopathy. No left inguinal adenopathy.   Neurological:      Mental Status: She is oriented to person, place,  and time. She is lethargic.      Cranial Nerves: Cranial nerves 2-12 are intact. No cranial nerve deficit.      Sensory: No sensory deficit.      Motor: Weakness (right-sided) present.      Gait: Gait is intact.      Deep Tendon Reflexes: Reflexes normal.   Psychiatric:         Mood and Affect: Mood normal. Mood is not anxious or depressed. Affect is not angry.         Behavior: Behavior is not agitated.         Thought Content: Thought content normal.         Judgment: Judgment normal.     LAB WORK:  Laboratory studies were reviewed.    Assessment/Plan  Problem List Items Addressed This Visit             ICD-10-CM       Cardiac and Vasculature    Hypertension I10    Hyperlipidemia, unspecified E78.5       Endocrine/Metabolic    Hypothyroidism E03.9    Type 2 diabetes mellitus (CMS/[facility]) E11.9       Gastrointestinal and Abdominal    Gastroesophageal reflux disease K21.9       Hematology and Neoplasia    Absolute anemia D64.9       Mental Health    Depression F32.A     Other Visit Diagnoses         Codes    COPD on long-term inhaled steroid therapy (CMS/[facility])    -  Primary J44.9, Z79.51    Congestive heart failure, unspecified HF chronicity, unspecified heart failure type (CMS/[facility])     I50.9    Respiratory failure, unspecified chronicity, unspecified whether with hypoxia or hypercapnia (CMS/[facility])     J96.90    ESRD (end stage renal disease) (CMS/[facility])     N18.6    Cerebrovascular accident (CVA), unspecified mechanism (CMS/[facility])     I63.9    Weakness     R53.1    At risk for falling     Z91.81    Atrial fibrillation, unspecified type (CMS/[facility])     I48.91        1. COPD, on bronchodilator therapy.  2. Congestive heart failure, on diuretic.  3. Respiratory failure, on oxygen.  4. End-stage renal disease, on hemodialysis.  5. CVA, on supportive care.  6. Hypertension, medically controlled.  7. Diabetes, on insulin.  8. Weakness, on PT/OT.  9. Fall risk, on fall precautions.  10. Hypothyroidism, on levothyroxine.  11.  Hyperlipidemia, on statin.  12. Depression, on medication.  13. Anemia.  Follow CBC.  14. Atrial fibrillation, on rate control.  15. Reflux disease, on PPI.    Scribe Attestation  By signing my name below, I, Ann Santos attest that this documentation has been prepared under the direction and in the presence of Cale Lowe MD.   All medical record entries made by the scribe were personally dictated by me I have reviewed the chart and agree the record accurately reflects my personal performance of his history physical examination and management      Electronically Signed By: Cale Lowe MD   3/24/24 10:58 PM

## 2024-03-19 ENCOUNTER — NURSING HOME VISIT (OUTPATIENT)
Dept: POST ACUTE CARE | Facility: EXTERNAL LOCATION | Age: 69
End: 2024-03-19
Payer: MEDICARE

## 2024-03-19 DIAGNOSIS — E16.2 HYPOGLYCEMIA: ICD-10-CM

## 2024-03-19 DIAGNOSIS — J18.9 COMMUNITY ACQUIRED PNEUMONIA, UNSPECIFIED LATERALITY: ICD-10-CM

## 2024-03-19 DIAGNOSIS — Z99.2 ANEMIA DUE TO CHRONIC KIDNEY DISEASE, ON CHRONIC DIALYSIS (MULTI): ICD-10-CM

## 2024-03-19 DIAGNOSIS — D63.1 ANEMIA DUE TO CHRONIC KIDNEY DISEASE, ON CHRONIC DIALYSIS (MULTI): ICD-10-CM

## 2024-03-19 DIAGNOSIS — I13.0 CARDIORENAL SYNDROME WITH RENAL FAILURE, STAGE 1-4 OR UNSPECIFIED CHRONIC KIDNEY DISEASE, WITH HEART FAILURE (MULTI): ICD-10-CM

## 2024-03-19 DIAGNOSIS — I48.91 ATRIAL FIBRILLATION, UNSPECIFIED TYPE (MULTI): ICD-10-CM

## 2024-03-19 DIAGNOSIS — J96.22 ACUTE ON CHRONIC RESPIRATORY FAILURE WITH HYPOXIA AND HYPERCAPNIA (MULTI): ICD-10-CM

## 2024-03-19 DIAGNOSIS — N18.6 ANEMIA DUE TO CHRONIC KIDNEY DISEASE, ON CHRONIC DIALYSIS (MULTI): ICD-10-CM

## 2024-03-19 DIAGNOSIS — J90 PLEURAL EFFUSION ON RIGHT: ICD-10-CM

## 2024-03-19 DIAGNOSIS — J96.21 ACUTE ON CHRONIC RESPIRATORY FAILURE WITH HYPOXIA AND HYPERCAPNIA (MULTI): ICD-10-CM

## 2024-03-19 DIAGNOSIS — E11.9 TYPE 2 DIABETES MELLITUS WITHOUT COMPLICATION, WITH LONG-TERM CURRENT USE OF INSULIN (MULTI): ICD-10-CM

## 2024-03-19 DIAGNOSIS — Z79.4 TYPE 2 DIABETES MELLITUS WITHOUT COMPLICATION, WITH LONG-TERM CURRENT USE OF INSULIN (MULTI): ICD-10-CM

## 2024-03-19 DIAGNOSIS — R60.1 ANASARCA: Primary | ICD-10-CM

## 2024-03-19 DIAGNOSIS — R53.81 PHYSICAL DECONDITIONING: ICD-10-CM

## 2024-03-19 PROCEDURE — 99309 SBSQ NF CARE MODERATE MDM 30: CPT | Performed by: NURSE PRACTITIONER

## 2024-03-19 NOTE — LETTER
Patient: Daphne Morris  : 1955    Encounter Date: 2024    Chief Complaint:   SNF F/U  Acute on chronic diastolic heart failure  Physical deconditioning/weakness  Acute hypoxic respiratory failure   Pleural effusion  ANDREA  Cardiorenal syndrome   Anemia    HPI:   68 year-old female presenting to [facility] ER on 24 with shortness of breath, difficulty ambulating, generalized weakness, and BLE edema. Work-up in ER: /63, HR 53, Temp 36.6, RR 20, SpO2 95%, EKG showed sinus bradycardia, CXR showed a new large right pleural effusion and basilar atelectasis, underlying pneumonia is not excluded, CT of chest showed large right pleural effusion with complete collapse of the RLL, atelectasis/consolidation at the RUL and RML, tree-in-bud airspace opacities at the RUL and LLL, and left thyroid gland nodule, UA + glucose and protein, Hgb 9.5, Hct 32.8, Glu 220, K+ 5.4, BUN 49, Cr 3.25, BNP 1,786. Case was discussed with cardiology and nephrology, who recommended IV diuresis. Pt. was started on IV diuretics and IV ATB and admitted to [facility] for further evaluation and treatment. Hospital course:    Acute hypoxic hypercapnic respiratory failure 2/2 CAP/COPD exacerbation/acute on chronic heart failure/large right pleural effusion-2 gm Na+ diet, I&O, daily weights, BiPAP --> supplemental O2, IV lasix, IV solu-medrol, IV Rocephin, IV Azithromycin, duonebs, cardiology consult, pulmonology consult, underwent thoracentesis on  (2300 ml of clear pleural fluid was obtained), TTE showed normal LVSF with 50-56% EF, impaired relaxation pattern of left ventricular diastolic filling, mildly reduced right ventricular systolic function, underwent RHC on  which showed mild pulmonary hypertension, diuretic changed to PO upon discharge, patient to F/U with cardiology after discharge  ANDREA-nephrology consult, renal US negative for left hydronephrosis, cardiorenal syndrome suspected 2/2 acute decompensated CHF, F/U with  nephrologist after discharge (Dr. Mitchell)  Generalized weakness-PT/OT evaluations, recommending SNF   Left knee pain-XR showed small joint effusion and degenerative changes, reticular increased density in the subcutaneous tissues which may represent lymphedema and/or cellulitis, Tylenol prn for pain   AFib/CAD/HLD-s/p Watchman in August 2023, c/w atorvastatin, amiodarone, metoprolol, ASA, plavix, monitored on telemetry   DM2-accuchecks, c/w lantus, ISS, jardiance held   GERD-PPI  Depression-c/w venlafaxine   Constipation-laxatives and stool softeners added    Pt. was HDS and discharged to [facility] on 2/15/24. On 2/18/24, patient was sent to the ER for lethargy and hypoxia. Work-up in ER: D-Dimer 13,011, BUN 58, Cr 2.88, WBC 6.7, Hgb 8.0, Trop 43, BNP 1,444, CXR showed stable pleural and parenchymal opacities in the mid and lower right hemithorax and a mild new left pleural effusion with left basilar atelectasis or edema, SpO2 82% on RA. Pt. was given Lasix 80 mg. US of BLE was negative for DVT. Pt. was placed on BiPAP and transferred to [facility] for further evaluation and treatment. Hospital course:     Acute on chronic hypoxic/hypercapnic respiratory failure 2/2 Influenza B, COPD exacerbation, and decompensated heart failure-BiPAP --> high-flow O2 --> supplemental O2, tamiflu, prednisone, duonebs, diuresis --> dialysis, pulmonology and cardiology consulted   Acute on chronic diastolic CHF-strict I&O, daily weight, 2 gm Na+ diet, c/w metoprolol, cardiology consult   Elevated D-Dimer-US negative for DVT, VQ scan showed low probability of PE  BLE edema/redness-possibly underlying cellulitis, leg elevation, local wound care, IV vancomycin  Acute on chronic anemia-required blood transfusion, iron started, CBC monitored   ANDREA on CKD-nephrology consulted, TDC placed to R chest on 3/1 by general surgery, dialysis started    Pt. was HDS and discharged back to [facility] on 3/5/24. On 3/7, patient was reported  "to have increased BLE edema with fluid-filled blisters and c/o pain. Venous US was ordered, which was negative for DVT. On 3/9, patient was at dialysis and was observed to be confused and c/o dizziness. SpO2 was 66% and she was placed on 5 L of O2. Pt. refused to go to the ER, but eventually agreed to evaluation. Work-up in ER: CXR showed a right basilar opacity, Flu negative, COVID negative, RSV negative. Pt. was discharged back to SNF on Levaquin for pneumonia.     On 3/13, routine blood work revealed Hgb of 6.4. Pt. to be set-up for outpatient blood transfusion at [facility]. On 3/14, staff reported patient with low BP (77/58) and lethargy. She was sent to [facility] ER for evaluation. In the ER, /114, HR 65, RR 28, SpO2 95%. Labs were drawn, but the ER discharged patient prior to dialysis prior to the labs resulting. Transport reported that patient was very lethargic and drowsy on the ride to dialysis to the point where he almost took the patient back to the ER. Upon review of the ER labs, Glu was 24 and Hgb was 6.9. When patient arrived to dialysis, blood sugar was checked and was 47. She was given 1 tube of glucose and a snack with improvement noted in blood sugar to 147 as well as resolution of lethargy.     Today, patient is s/p blood transfusion on 3/15. She denies dizziness, HA, vision changes, SOB, cough, or chest pain. She reports improvement in generalized edema and reports less \"seeping\" of clear fluid from her arms and legs. She continues to report BLE pain, that is described as \"burning\". She reports improvement in appetite. Blood sugars reviewed in chart. Staff report no other clinical concerns at this time.     ROS:    As above in HPI. Otherwise, all other systems have been reviewed and are negative for complaint.    Medications reviewed and verified in NH chart.     Patient Active Problem List   Diagnosis   • Hypothyroidism   • Acute on chronic diastolic heart failure (CMS/HCC)   • Altered " mental status   • Anxiety   • Chronic fatigue   • Constipation by delayed colonic transit   • Chronic obstructive pulmonary disease (CMS/[facility])   • Type 2 diabetes mellitus (CMS/[facility])   • Diabetic renal disease (CMS/[facility])   • Gastroesophageal reflux disease   • History of renal cell carcinoma   • Hypertension   • Peripheral vascular disease (CMS/[facility])   • Leukocytosis   • Lung nodule   • Major depressive disorder, single episode, unspecified   • Osteoarthritis   • Paroxysmal atrial fibrillation (CMS/[facility])   • Primary insomnia   • Stage 3b chronic kidney disease (CKD) (CMS/[facility])   • Trouble walking   • Hyperlipidemia, unspecified   • Anemia due to chronic kidney disease, on chronic dialysis (CMS/[facility])   • ANDREA (acute kidney injury) (CMS/[facility])   • ASHD (arteriosclerotic heart disease)   • At risk for falling   • Essential tremor   • Vitamin D deficiency   • Acute on chronic respiratory failure with hypoxia (CMS/[facility])   • Pulmonary hypertension (CMS/[facility])   • Pulmonary edema   • Influenza B   • History of right nephrectomy   • Cognitive impairment        Past Medical History:   Diagnosis Date   • Anal fissure 10/12/2023   • Anemia    • At risk for falls    • Atrial fibrillation (CMS/[facility])    • CHF (congestive heart failure) (CMS/HCC)    • COPD (chronic obstructive pulmonary disease) (CMS/[facility])    • CVA (cerebral vascular accident) (CMS/HCC)     2021- right-sided weakness   • Depression    • Diabetes mellitus (CMS/[facility])    • GERD (gastroesophageal reflux disease)    • Hyperlipidemia    • Hypertension    • Hypothyroidism    • Hypoxia    • Impacted cerumen, left ear     Impacted cerumen of left ear   • Noncompliance    • Perianal dermatitis 10/12/2023   • Personal history of other diseases of the respiratory system     History of acute bronchitis   • PVD (peripheral vascular disease) (CMS/HCC)    • Renal carcinoma, right (CMS/HCC)     s/p partial nephrectomy 8/2021   • Respiratory failure (CMS/HCC)    • TIA (transient ischemic attack)     • Vasculitis (CMS/HCC) 10/12/2023   • Weakness        Past Surgical History:   Procedure Laterality Date   • ANKLE SURGERY         • CARDIAC CATHETERIZATION N/A 2024    Procedure: Right Heart Cath;  Surgeon: Nicholas Antonio MD;  Location: [facility] Cardiac Cath Lab;  Service: Cardiovascular;  Laterality: N/A;   • CATARACT EXTRACTION Right     2019   •  SECTION, CLASSIC     • MR CHEST ANGIO W AND WO IV CONTRAST  2023    MR CHEST ANGIO W AND WO IV CONTRAST 2023 [facility] MRI   • MR HEAD ANGIO WO IV CONTRAST  2021    MR HEAD ANGIO WO IV CONTRAST [facility]   • NEPHRECTOMY  2021   • SHOULDER SURGERY             Family History   Problem Relation Name Age of Onset   • Hypertension Mother     • Diabetes Father     • Heart disease Father     • Cancer Sister     • Cancer Brother     • Diabetes Daughter     • Asthma Daughter     • Heart attack Daughter     • Diabetes Maternal Grandfather     • Heart disease Paternal Grandmother     • Diabetes Other     • Hypertension Other     • COPD Other         Social History     Tobacco Use   Smoking Status Former   • Packs/day: 0.50   • Years: 50.00   • Additional pack years: 0.00   • Total pack years: 25.00   • Types: Cigarettes   • Start date: 1969   • Passive exposure: Never   Smokeless Tobacco Never       Social History     Substance and Sexual Activity   Alcohol Use Not Currently       Social History     Substance and Sexual Activity   Drug Use Yes   • Types: Marijuana       Allergies   Allergen Reactions   • Citalopram Other, Rash and Hives     Facial breakout, blisters, and rash   • Adhesive Tape-Silicones Other     blisters   • Amoxicillin Swelling   • Bee Venom Protein (Honey Bee) Swelling   • Codeine Headache and Other     Migraines   • Latex Other     blisters   • Lisinopril Swelling     Facial swelling   • Prednisone Other     Yeast infection   • Tuberculin Ppd Unknown   • Doxycycline Rash   • Oseltamivir Rash   •  Phenyleph-Shark Oil-Glyc-Pet Rash   • Phenylephrine Nausea/vomiting        Vital Signs:   132/63-74-16-98.4-97% on RA     Physical Exam:  General: Sitting up in WC in NAD, alert   Head/Face: NCAT, symmetrical  Eyes: PERRLA, no injection, no discharge  ENT: Hearing not impaired, ears without scars or lesions, nasal mucosa and turbinates pink, septum midline, lips pink and moist  Neck: Supple, symmetrical  Respiratory: CTA but diminished without adventitious sounds, respirations even and nonlabored without use of accessory muscles, good air exchange  Cardio: RRR without murmur or gallops, normal S1S2, + anasarca (improved), BLE NP edema (improved), pedal pulses 2+/4 bilaterally  Chest/Breast: Symmetrical, tunneled dialysis cath to R chest   GI: BS x 4, normoactive, non-distended, abd round and soft, no masses or tenderness  : No suprapubic tenderness or distention  MSK: Gait not assessed, joints with full ROM without pain or contractures, + generalized weakness  Skin: Skin warm and dry, no induration, tubi- intact to BLE  Neurologic: Cranial nerves II through XII intact, superficial touch and pain sensation intact  Psychiatric: Alert to person, place, and time, calm and cooperative     Results/Data:   3/18/24: BUN 38, Cr 2.41, GFR 21, Na+ 136, K+ 3.5, Kel 7.4, Hgb 7.3, Hct 23.8, Plt 175  3/14/24: Glu 24, Hgb 6.9  3/9/24: Glu 95, K+ 3.4, CO2 33, BUN 7, Cr 0.84, GFR 75, Kel 7.6, Alb 2.6, Hgb 7.9, Hct 27.0  3/6/24: HgbA1C 7.7, Chol 147, , HDL 65, LDL 51    Assessment/Plan:  Acute on chronic respiratory failure with hypoxia and hypercapnia 2/2 pneumonia/COPD exacerbation/acute on chronic HFpEF/right pleural effusion-c/w supplemental O2 as needed, 2 gm Na+ diet, monitor weights, 1500 ml fluid restriction, s/p thoracentesis (2/2/24) with 2.3 L of fluid removed, c/w dialysis as scheduled, s/p Levaquin (end date 3/18), mikaela prn, monitor respiratory status closely  Physical deconditioning/generalized  weakness-c/w PT/OT, safety and fall precautions   ANDREA on CKD-in setting of acute decompensated heart failure, avoid nephrotoxic drugs, c/w dialysis, monitor R chest dialysis catheter site, monitor renal function, F/U with nephrologist as scheduled  AFib/CAD/HTN/HLD-s/p Watchman procedure (Aug. 2023), s/p RHC (2/27/24) which showed mild pulmonary hypertension, c/w amiodarone, aspirin, atorvastatin, metoprolol, and plavix, monitor BP and HR, F/U with cardiologist as scheduled  Hypokalemia-monitor, replete as needed  Anemia-s/p 1 unit of PRBCs on 3/15, c/w iron supplement, monitor CBC, transfuse for Hgb less than 7.0  GERD-c/w PPI  Depression-c/w venlafaxine (increased to 150 mg daily on 3/11 per psych), supportive care, Psych following  DM2-LCS diet, accuchecks, c/w humalog ISS with meals, decrease basaglar to 10 units daily, HS snack, BP and lipid control, yearly eye exam, diabetic foot care, monitor HgbA1C  BLE edema/pain-elevate as tolerated, tubi- to BLE, local wound care, c/w Tylenol ES 1000 mg BID, consider gabapentin for neuropathy, monitor closely  Influenza B-RESOLVED, positive on 2/19/24, treated with Tamiflu  Hypoalbuminemia-increase protein intake, monitor albumin level, RD consult  Hypothyroidism/left thyroid nodule-c/w levothyroxine, monitor TSH and FT4, F/U with PCP after discharge for thyroid ultrasound    Orders:  NNO    Code Status:   Full Code      Electronically Signed By: CARLENE Olivares-CNP   4/7/24  1:26 PM

## 2024-03-21 ENCOUNTER — APPOINTMENT (OUTPATIENT)
Dept: RADIOLOGY | Facility: HOSPITAL | Age: 69
End: 2024-03-21
Payer: MEDICARE

## 2024-03-21 ENCOUNTER — HOSPITAL ENCOUNTER (EMERGENCY)
Facility: HOSPITAL | Age: 69
Discharge: OTHER INSTITUTION | End: 2024-03-21
Attending: STUDENT IN AN ORGANIZED HEALTH CARE EDUCATION/TRAINING PROGRAM
Payer: MEDICARE

## 2024-03-21 VITALS
BODY MASS INDEX: 30.07 KG/M2 | SYSTOLIC BLOOD PRESSURE: 153 MMHG | HEART RATE: 70 BPM | OXYGEN SATURATION: 94 % | RESPIRATION RATE: 20 BRPM | HEIGHT: 67 IN | DIASTOLIC BLOOD PRESSURE: 69 MMHG | WEIGHT: 191.58 LBS | TEMPERATURE: 97.5 F

## 2024-03-21 VITALS
RESPIRATION RATE: 18 BRPM | DIASTOLIC BLOOD PRESSURE: 82 MMHG | TEMPERATURE: 97.9 F | HEART RATE: 86 BPM | SYSTOLIC BLOOD PRESSURE: 134 MMHG

## 2024-03-21 DIAGNOSIS — T82.41XA HEMODIALYSIS CATHETER DYSFUNCTION, INITIAL ENCOUNTER (CMS-HCC): Primary | ICD-10-CM

## 2024-03-21 PROCEDURE — 12001 RPR S/N/AX/GEN/TRNK 2.5CM/<: CPT

## 2024-03-21 PROCEDURE — 71045 X-RAY EXAM CHEST 1 VIEW: CPT

## 2024-03-21 PROCEDURE — 99283 EMERGENCY DEPT VISIT LOW MDM: CPT | Mod: 25

## 2024-03-21 PROCEDURE — 71045 X-RAY EXAM CHEST 1 VIEW: CPT | Performed by: RADIOLOGY

## 2024-03-21 ASSESSMENT — ENCOUNTER SYMPTOMS
CHILLS: 0
FEVER: 0

## 2024-03-21 ASSESSMENT — PAIN - FUNCTIONAL ASSESSMENT: PAIN_FUNCTIONAL_ASSESSMENT: 0-10

## 2024-03-21 ASSESSMENT — PAIN SCALES - GENERAL: PAINLEVEL_OUTOF10: 0 - NO PAIN

## 2024-03-21 NOTE — DISCHARGE INSTRUCTIONS
Your catheter is in the correct place based on our chest xray today. Please avoid pulling or tugging on your catheter.

## 2024-03-21 NOTE — ED PROVIDER NOTES
HPI   Chief Complaint   Patient presents with   • Vascular Access Problem     Patient went to dialysis treatment today and catheter was accidentally pulled out. Dialysis schedule Tues, Thurs, Sat. Originally from [facility]. A and O x 4.        68-year-old female presents with dialysis catheter malfunction.  Patient has a history of ESRD on hemodialysis Tuesday, Thursday, Saturday.  Had a normal session 2 days ago.  Went for dialysis session today and was told that her dialysis catheter is possibly dislodged.  They did not attempt to flush or draw back from the catheter.  Patient was sent immediately to emergency department for evaluation.  Patient denies any pain to the chest.  Patient denies pulling or tugging on her catheter.                        Lee Coma Scale Score: 15                     Patient History   Past Medical History:   Diagnosis Date   • Acute CHF (CMS/[facility])     04/2023   • ANDREA (acute kidney injury) (CMS/[facility])    • Anemia    • Arthritis    • ASHD (arteriosclerotic heart disease)    • At risk for falling    • Atrial fibrillation (CMS/[facility])    • Cancer of kidney (CMS/[facility])    • CHF (congestive heart failure) (CMS/[facility])    • Chronic diastolic CHF (congestive heart failure) (CMS/[facility])    • CKD (chronic kidney disease)    • Class 2 obesity with body mass index (BMI) of 35.0 to 35.9 in adult 02/19/2024    35.26 kg/m²   • COPD (chronic obstructive pulmonary disease) (CMS/[facility])    • CVA (cerebral vascular accident) (CMS/[facility])     2021- righ sided weakness   • Depression    • Diabetes (CMS/[facility])    • End-stage renal disease on hemodialysis (CMS/[facility])    • Essential tremor    • GERD (gastroesophageal reflux disease)    • HTN (hypertension)    • Hyperlipidemia    • Hypothyroidism     s/p radioactive iodine treatment   • Hypoxia    • Impacted cerumen, left ear     Impacted cerumen of left ear   • Influenza B    • Left ventricular ejection fraction greater than 40%     40-45%   • Noncompliance    •  Osteoarthritis    • Personal history of other diseases of the respiratory system     History of acute bronchitis   • Pulmonary edema    • Pulmonary hypertension (CMS/HCC)    • PVD (peripheral vascular disease) (CMS/HCC)    • Renal carcinoma, right (CMS/HCC)     s/p partial nephrectomy 2021   • Respiratory failure (CMS/HCC)    • Stage 3b chronic kidney disease (CKD) (CMS/HCC)    • Vitamin D deficiency    • Weakness      Past Surgical History:   Procedure Laterality Date   • ANKLE SURGERY         • CARDIAC CATHETERIZATION N/A 2024    Procedure: Right Heart Cath;  Surgeon: Nicholas Antonio MD;  Location: [facility] Cardiac Cath Lab;  Service: Cardiovascular;  Laterality: N/A;   • CATARACT EXTRACTION Right     2019   •  SECTION, CLASSIC     • MR CHEST ANGIO W AND WO IV CONTRAST  2023    MR CHEST ANGIO W AND WO IV CONTRAST 2023 [facility] MRI   • MR HEAD ANGIO WO IV CONTRAST  2021    MR HEAD ANGIO WO IV CONTRAST [facility]   • NEPHRECTOMY  2021   • SHOULDER SURGERY      2009     Family History   Problem Relation Name Age of Onset   • Hypertension Mother     • Diabetes Father     • Heart disease Father     • Cancer Sister     • Cancer Brother     • Diabetes Daughter     • Asthma Daughter     • Heart attack Daughter     • Diabetes Maternal Grandfather     • Heart disease Paternal Grandmother     • Diabetes Other     • Hypertension Other     • COPD Other       Social History     Tobacco Use   • Smoking status: Every Day     Packs/day: 0.50     Years: 50.00     Additional pack years: 0.00     Total pack years: 25.00     Types: Cigarettes     Start date: 1969     Passive exposure: Never   • Smokeless tobacco: Never   Vaping Use   • Vaping Use: Never used   Substance Use Topics   • Alcohol use: Not Currently   • Drug use: Yes     Types: Marijuana       Physical Exam   ED Triage Vitals [24 0756]   Temperature Heart Rate Respirations BP   36.4 °C (97.6 °F) 67 20 150/72       Pulse Ox Temp Source Heart Rate Source Patient Position   94 % Oral -- --      BP Location FiO2 (%)     -- --       Physical Exam  Vitals and nursing note reviewed.   Constitutional:       General: She is not in acute distress.     Appearance: She is not ill-appearing.   HENT:      Head: Normocephalic and atraumatic.      Mouth/Throat:      Mouth: Mucous membranes are moist.      Pharynx: Oropharynx is clear.   Eyes:      Extraocular Movements: Extraocular movements intact.      Conjunctiva/sclera: Conjunctivae normal.      Pupils: Pupils are equal, round, and reactive to light.   Cardiovascular:      Rate and Rhythm: Normal rate and regular rhythm.   Pulmonary:      Effort: Pulmonary effort is normal. No respiratory distress.      Breath sounds: Normal breath sounds.   Chest:      Comments: Tunnel catheter in place to right chest wall.  Sutures intact.  Proximal sutures, increased laxity to tunnel catheter line.  Abdominal:      General: There is no distension.      Palpations: Abdomen is soft.      Tenderness: There is no abdominal tenderness.   Musculoskeletal:         General: No swelling or deformity. Normal range of motion.      Cervical back: Normal range of motion and neck supple.   Skin:     General: Skin is warm and dry.      Capillary Refill: Capillary refill takes less than 2 seconds.   Neurological:      General: No focal deficit present.      Mental Status: She is alert and oriented to person, place, and time. Mental status is at baseline.   Psychiatric:         Mood and Affect: Mood normal.         Behavior: Behavior normal.       ED Course & MDM   ED Course as of 03/21/24 0855   Thu Mar 21, 2024   0830 XR chest 1 view  IMPRESSION:  1. Probable cardiomegaly versus pericardial effusion.  2. Dual port central venous catheter is present with tip overlying  the SVC.  3. Right pleural effusion and right basilar density consistent with  atelectasis and/or infiltrate.   [JM]      ED Course User  Index  [JM] Nina Cohen MD         Diagnoses as of 03/21/24 0855   Hemodialysis catheter dysfunction, initial encounter (CMS/[facility])       Medical Decision Making  68-year-old female presents with dialysis catheter malfunction.  Considered dislodged catheter, broken catheter, clotted catheter.  Sent from dialysis with concern for dislodged catheter.  Chest x-ray confirms that the catheter is internally in correct position.  External review of the catheter shows that the sutures are intact but has some laxity of the tubing proximal to the sutures.  Spoke with dialysis who agrees to dialyze the patient after confirmation of the correct positioning but request that we replaced sutures.  Sutures replaced without complication.  At this time we will cancel labs as patient has scheduled dialysis planned.  Patient stable for discharge back to dialysis center.        Procedure  General    Performed by: Nina Cohen MD  Authorized by: Nina Cohen MD    Consent:     Consent obtained:  Verbal    Consent given by:  Patient  Indications:     Indications:  Suture replacement  Pre-procedure details:     Procedure prep: alcohol.    Preparation: Patient was prepped and draped in the usual sterile fashion    Sedation:     Sedation type:  None  Anesthesia:     Anesthesia method:  Local infiltration    Local anesthetic:  Lidocaine 1% w/o epi  Procedure specific details:      Two 4-0 Nylon sutures placed to Dialysis tunnel catheter to designated suture pollard site  Post-procedure details:     Procedure completion:  Tolerated well, no immediate complications       Nina Cohen MD  03/21/24 1029

## 2024-03-21 NOTE — PROGRESS NOTES
PLACE OF SERVICE:  [facility] [place]    This is a subsequent visit.    Subjective   Patient ID: Daphne Morris is a 68 y.o. female who presents for Follow-up.    Ms. Cecelia Morris is a 68-year-old female with history of end-stage renal disease, on hemodialysis.  She suffers from heart failure as well as COPD.  She has hypoxia and is oxygen dependent.  She requires supportive care.    Review of Systems   Constitutional:  Negative for chills and fever.   Cardiovascular:  Negative for chest pain.   All other systems reviewed and are negative.    Objective   /82   Pulse 86   Temp 36.6 °C (97.9 °F)   Resp 18     Physical Exam  Vitals reviewed.   Constitutional:       Comments: This is a well-developed, well-nourished female, lying in bed, appearing weak   HENT:      Right Ear: Tympanic membrane, ear canal and external ear normal.      Left Ear: Tympanic membrane, ear canal and external ear normal.   Eyes:      General: No scleral icterus.     Pupils: Pupils are equal, round, and reactive to light.   Neck:      Vascular: No carotid bruit.   Cardiovascular:      Heart sounds: Normal heart sounds, S1 normal and S2 normal. No murmur heard.     No friction rub.   Pulmonary:      Breath sounds: Decreased breath sounds (throughout) present.   Abdominal:      Palpations: There is no hepatomegaly, splenomegaly or mass.   Musculoskeletal:         General: No swelling or deformity. Normal range of motion.      Cervical back: Neck supple.      Right lower leg: Edema present.      Left lower leg: Edema present.   Lymphadenopathy:      Cervical: No cervical adenopathy.      Upper Body:      Right upper body: No axillary adenopathy.      Left upper body: No axillary adenopathy.      Lower Body: No right inguinal adenopathy. No left inguinal adenopathy.   Neurological:      Mental Status: She is oriented to person, place, and time. She is lethargic.      Cranial Nerves: Cranial nerves 2-12  are intact. No cranial nerve deficit.      Sensory: No sensory deficit.      Motor: Weakness (right-sided) present.      Gait: Gait is intact.      Deep Tendon Reflexes: Reflexes normal.   Psychiatric:         Mood and Affect: Mood normal. Mood is not anxious or depressed. Affect is not angry.         Behavior: Behavior is not agitated.         Thought Content: Thought content normal.         Judgment: Judgment normal.     LAB WORK:  Laboratory studies were reviewed.    Assessment/Plan   Problem List Items Addressed This Visit             ICD-10-CM       Cardiac and Vasculature    Hypertension I10    Hyperlipidemia, unspecified E78.5       Endocrine/Metabolic    Hypothyroidism E03.9    Type 2 diabetes mellitus (CMS/[facility]) E11.9       Gastrointestinal and Abdominal    Gastroesophageal reflux disease K21.9       Hematology and Neoplasia    Absolute anemia D64.9       Mental Health    Depression F32.A     Other Visit Diagnoses         Codes    COPD on long-term inhaled steroid therapy (CMS/HCC)    -  Primary J44.9, Z79.51    Congestive heart failure, unspecified HF chronicity, unspecified heart failure type (CMS/HCC)     I50.9    Respiratory failure, unspecified chronicity, unspecified whether with hypoxia or hypercapnia (CMS/HCC)     J96.90    ESRD (end stage renal disease) (CMS/HCC)     N18.6    Cerebrovascular accident (CVA), unspecified mechanism (CMS/HCC)     I63.9    Weakness     R53.1    At risk for falling     Z91.81    Atrial fibrillation, unspecified type (CMS/[facility])     I48.91        1. COPD, on bronchodilator therapy.  2. Congestive heart failure, on diuretic.  3. Respiratory failure, on oxygen.  4. End-stage renal disease, on hemodialysis.  5. CVA, on supportive care.  6. Hypertension, medically controlled.  7. Diabetes, on insulin.  8. Weakness, on PT/OT.  9. Fall risk, on fall precautions.  10. Hypothyroidism, on levothyroxine.  11. Hyperlipidemia, on statin.  12. Depression, on medication.  13. Anemia.   Follow CBC.  14. Atrial fibrillation, on rate control.  15. Reflux disease, on PPI.    Katarinaibisabella Attestation  By signing my name below, I, Ann Santos attest that this documentation has been prepared under the direction and in the presence of Cale Lowe MD.   All medical record entries made by the katarinaibe were personally dictated by me I have reviewed the chart and agree the record accurately reflects my personal performance of his history physical examination and management

## 2024-03-23 NOTE — PROGRESS NOTES
Emergency Medicine Transition of Care Note.    I received Daphne Morris in signout from Dr. Avelar please see the previous ED provider note for all HPI, PE and MDM up to the time of signout at 2000. This is in addition to the primary record.    In brief Daphne Morris is an 68 y.o. female presenting for   Chief Complaint   Patient presents with    Nausea    Shortness of Breath     At the time of signout we were awaiting: X-ray reports    Diagnoses as of 03/22/24 2126   Acute systolic congestive heart failure (CMS/HCC)   Atrial fibrillation with rapid ventricular response (CMS/HCC)       Medical Decision Making  Patient is stable but still having some shortness of breath.  Her x-rays show a new pleural effusion along with the congestive heart failure and atrial fibrillation with RVR.  She has been transferred to [facility] for higher level of care.  She will need input from cardiology.        Final diagnoses:   [I50.21] Acute systolic congestive heart failure (CMS/HCC)   [I48.91] Atrial fibrillation with rapid ventricular response (CMS/HCC)           Procedure  Procedures    Seth Bone MD    Hypertension

## 2024-03-24 ENCOUNTER — NURSING HOME VISIT (OUTPATIENT)
Dept: POST ACUTE CARE | Facility: EXTERNAL LOCATION | Age: 69
End: 2024-03-24
Payer: MEDICARE

## 2024-03-24 DIAGNOSIS — J44.9 COPD ON LONG-TERM INHALED STEROID THERAPY (MULTI): ICD-10-CM

## 2024-03-24 DIAGNOSIS — E11.9 TYPE 2 DIABETES MELLITUS WITHOUT COMPLICATION, WITH LONG-TERM CURRENT USE OF INSULIN (MULTI): ICD-10-CM

## 2024-03-24 DIAGNOSIS — Z79.51 COPD ON LONG-TERM INHALED STEROID THERAPY (MULTI): ICD-10-CM

## 2024-03-24 DIAGNOSIS — R09.02 HYPOXIA: ICD-10-CM

## 2024-03-24 DIAGNOSIS — F32.A DEPRESSION, UNSPECIFIED DEPRESSION TYPE: ICD-10-CM

## 2024-03-24 DIAGNOSIS — N18.9 CHRONIC KIDNEY DISEASE, UNSPECIFIED CKD STAGE: ICD-10-CM

## 2024-03-24 DIAGNOSIS — I50.9 CONGESTIVE HEART FAILURE, UNSPECIFIED HF CHRONICITY, UNSPECIFIED HEART FAILURE TYPE (MULTI): Primary | ICD-10-CM

## 2024-03-24 DIAGNOSIS — I48.91 ATRIAL FIBRILLATION, UNSPECIFIED TYPE (MULTI): ICD-10-CM

## 2024-03-24 DIAGNOSIS — Z79.4 TYPE 2 DIABETES MELLITUS WITHOUT COMPLICATION, WITH LONG-TERM CURRENT USE OF INSULIN (MULTI): ICD-10-CM

## 2024-03-24 DIAGNOSIS — D64.9 ANEMIA, UNSPECIFIED TYPE: ICD-10-CM

## 2024-03-24 DIAGNOSIS — K21.9 GASTROESOPHAGEAL REFLUX DISEASE WITHOUT ESOPHAGITIS: ICD-10-CM

## 2024-03-24 DIAGNOSIS — I10 HYPERTENSION, UNSPECIFIED TYPE: ICD-10-CM

## 2024-03-24 DIAGNOSIS — E03.9 HYPOTHYROIDISM, UNSPECIFIED TYPE: ICD-10-CM

## 2024-03-24 DIAGNOSIS — I63.9 CEREBROVASCULAR ACCIDENT (CVA), UNSPECIFIED MECHANISM (MULTI): ICD-10-CM

## 2024-03-24 PROCEDURE — 99309 SBSQ NF CARE MODERATE MDM 30: CPT | Performed by: INTERNAL MEDICINE

## 2024-03-24 NOTE — LETTER
Patient: Daphne Morris  : 1955    Encounter Date: 2024    PLACE OF SERVICE:  [facility] [place]    This is a subsequent visit.    Subjective  Patient ID: Daphne Morris is a 68 y.o. female who presents for Follow-up.    Ms. Cecelia Morris is a 68-year-old female with history of congestive heart failure as well as COPD.  She has had a prior stroke affecting her right side.  She requires supportive care.    Review of Systems   Constitutional:  Negative for chills and fever.   Cardiovascular:  Negative for chest pain.   All other systems reviewed and are negative.    Objective  /78   Pulse 82   Temp 36.7 °C (98.1 °F)   Resp 18     Physical Exam  Vitals reviewed.   Constitutional:       General: She is not in acute distress.     Comments: This is a well-developed, well-nourished female, sitting in a chair   HENT:      Right Ear: Tympanic membrane, ear canal and external ear normal.      Left Ear: Tympanic membrane, ear canal and external ear normal.   Eyes:      General: No scleral icterus.     Pupils: Pupils are equal, round, and reactive to light.   Neck:      Vascular: No carotid bruit.   Cardiovascular:      Heart sounds: Normal heart sounds, S1 normal and S2 normal. No murmur heard.     No friction rub.   Pulmonary:      Breath sounds: Decreased breath sounds (throughout) present.   Abdominal:      Palpations: There is no hepatomegaly, splenomegaly or mass.   Musculoskeletal:         General: No swelling or deformity. Normal range of motion.      Cervical back: Neck supple.      Right lower leg: Edema present.      Left lower leg: Edema present.   Lymphadenopathy:      Cervical: No cervical adenopathy.      Upper Body:      Right upper body: No axillary adenopathy.      Left upper body: No axillary adenopathy.      Lower Body: No right inguinal adenopathy. No left inguinal adenopathy.   Neurological:      Mental Status: She is oriented to person, place, and  time.      Cranial Nerves: Cranial nerves 2-12 are intact. No cranial nerve deficit.      Sensory: No sensory deficit.      Motor: Motor function is intact. No weakness.      Gait: Gait is intact.      Deep Tendon Reflexes: Reflexes normal.   Psychiatric:         Mood and Affect: Mood normal. Mood is not anxious or depressed. Affect is not angry.         Behavior: Behavior is not agitated.         Thought Content: Thought content normal.         Judgment: Judgment normal.     LAB WORK:  Laboratory studies reviewed.    Assessment/Plan  Problem List Items Addressed This Visit             ICD-10-CM       Cardiac and Vasculature    Hypertension I10       Endocrine/Metabolic    Hypothyroidism E03.9    Type 2 diabetes mellitus (CMS/[facility]) E11.9       Gastrointestinal and Abdominal    Gastroesophageal reflux disease K21.9       Hematology and Neoplasia    Absolute anemia D64.9       Mental Health    Depression F32.A     Other Visit Diagnoses         Codes    Congestive heart failure, unspecified HF chronicity, unspecified heart failure type (CMS/[facility])    -  Primary I50.9    COPD on long-term inhaled steroid therapy (CMS/HCC)     J44.9, Z79.51    Hypoxia     R09.02    Cerebrovascular accident (CVA), unspecified mechanism (CMS/HCC)     I63.9    Chronic kidney disease, unspecified CKD stage     N18.9    Atrial fibrillation, unspecified type (CMS/[facility])     I48.91        1. Heart failure, on diuretic.  2. COPD, on bronchodilator therapy.  3. Hypoxia, on oxygen.  4. CVA, on supportive care.  5. Diabetes, on insulin.  6. Hypertension, med controlled.  7. CKD, monitor BMP.  8. Reflux disease, on PPI.  9. Anemia, follow CBC.  10. Atrial fibrillation, rate controlled.  11. Hypothyroidism, on levothyroxine.  12. Depression, on medication.    Scribe Attestation  By signing my name below, Ann RODRIGUEZ Scribe attest that this documentation has been prepared under the direction and in the presence of Cale Lowe MD.     All medical  record entries made by the scribe were personally dictated by me I have reviewed the chart and agree the record accurately reflects my personal performance of his history physical examination and management      Electronically Signed By: Cale Lowe MD   3/31/24  1:23 AM

## 2024-03-26 ENCOUNTER — NURSING HOME VISIT (OUTPATIENT)
Dept: POST ACUTE CARE | Facility: EXTERNAL LOCATION | Age: 69
End: 2024-03-26
Payer: MEDICARE

## 2024-03-26 VITALS
HEART RATE: 82 BPM | TEMPERATURE: 98.1 F | RESPIRATION RATE: 18 BRPM | DIASTOLIC BLOOD PRESSURE: 78 MMHG | SYSTOLIC BLOOD PRESSURE: 134 MMHG

## 2024-03-26 DIAGNOSIS — R60.1 ANASARCA: ICD-10-CM

## 2024-03-26 DIAGNOSIS — Z79.4 TYPE 2 DIABETES MELLITUS WITHOUT COMPLICATION, WITH LONG-TERM CURRENT USE OF INSULIN (MULTI): ICD-10-CM

## 2024-03-26 DIAGNOSIS — E11.9 TYPE 2 DIABETES MELLITUS WITHOUT COMPLICATION, WITH LONG-TERM CURRENT USE OF INSULIN (MULTI): ICD-10-CM

## 2024-03-26 DIAGNOSIS — I13.0 CARDIORENAL SYNDROME WITH RENAL FAILURE, STAGE 1-4 OR UNSPECIFIED CHRONIC KIDNEY DISEASE, WITH HEART FAILURE (MULTI): Primary | ICD-10-CM

## 2024-03-26 DIAGNOSIS — I48.91 ATRIAL FIBRILLATION, UNSPECIFIED TYPE (MULTI): ICD-10-CM

## 2024-03-26 DIAGNOSIS — F32.A DEPRESSION, UNSPECIFIED DEPRESSION TYPE: ICD-10-CM

## 2024-03-26 DIAGNOSIS — D64.9 ANEMIA, UNSPECIFIED TYPE: ICD-10-CM

## 2024-03-26 DIAGNOSIS — J18.9 COMMUNITY ACQUIRED PNEUMONIA, UNSPECIFIED LATERALITY: ICD-10-CM

## 2024-03-26 DIAGNOSIS — E03.9 HYPOTHYROIDISM, UNSPECIFIED TYPE: ICD-10-CM

## 2024-03-26 DIAGNOSIS — I50.9 CONGESTIVE HEART FAILURE, UNSPECIFIED HF CHRONICITY, UNSPECIFIED HEART FAILURE TYPE (MULTI): ICD-10-CM

## 2024-03-26 DIAGNOSIS — K21.9 GASTROESOPHAGEAL REFLUX DISEASE WITHOUT ESOPHAGITIS: ICD-10-CM

## 2024-03-26 DIAGNOSIS — N18.6 ESRD (END STAGE RENAL DISEASE) (MULTI): ICD-10-CM

## 2024-03-26 DIAGNOSIS — R53.81 PHYSICAL DECONDITIONING: ICD-10-CM

## 2024-03-26 PROCEDURE — 99309 SBSQ NF CARE MODERATE MDM 30: CPT | Performed by: NURSE PRACTITIONER

## 2024-03-26 ASSESSMENT — ENCOUNTER SYMPTOMS
CHILLS: 0
FEVER: 0

## 2024-03-26 NOTE — LETTER
Patient: Daphne Morris  : 1955    Encounter Date: 2024    Chief Complaint:   SNF F/U  Acute on chronic diastolic heart failure  Physical deconditioning/weakness  Acute hypoxic respiratory failure   Pleural effusion  ANDREA  Cardiorenal syndrome   Anemia    HPI:   68 year-old female presenting to [facility] ER on 24 with shortness of breath, difficulty ambulating, generalized weakness, and BLE edema. Work-up in ER: /63, HR 53, Temp 36.6, RR 20, SpO2 95%, EKG showed sinus bradycardia, CXR showed a new large right pleural effusion and basilar atelectasis, underlying pneumonia is not excluded, CT of chest showed large right pleural effusion with complete collapse of the RLL, atelectasis/consolidation at the RUL and RML, tree-in-bud airspace opacities at the RUL and LLL, and left thyroid gland nodule, UA + glucose and protein, Hgb 9.5, Hct 32.8, Glu 220, K+ 5.4, BUN 49, Cr 3.25, BNP 1,786. Case was discussed with cardiology and nephrology, who recommended IV diuresis. Pt. was started on IV diuretics and IV ATB and admitted to [facility] for further evaluation and treatment. Hospital course:    Acute hypoxic hypercapnic respiratory failure 2/2 CAP/COPD exacerbation/acute on chronic heart failure/large right pleural effusion-2 gm Na+ diet, I&O, daily weights, BiPAP --> supplemental O2, IV lasix, IV solu-medrol, IV Rocephin, IV Azithromycin, duonebs, cardiology consult, pulmonology consult, underwent thoracentesis on  (2300 ml of clear pleural fluid was obtained), TTE showed normal LVSF with 50-56% EF, impaired relaxation pattern of left ventricular diastolic filling, mildly reduced right ventricular systolic function, underwent RHC on  which showed mild pulmonary hypertension, diuretic changed to PO upon discharge, patient to F/U with cardiology after discharge  ANDREA-nephrology consult, renal US negative for left hydronephrosis, cardiorenal syndrome suspected 2/2 acute decompensated CHF, F/U with  nephrologist after discharge (Dr. Mitchell)  Generalized weakness-PT/OT evaluations, recommending SNF   Left knee pain-XR showed small joint effusion and degenerative changes, reticular increased density in the subcutaneous tissues which may represent lymphedema and/or cellulitis, Tylenol prn for pain   AFib/CAD/HLD-s/p Watchman in August 2023, c/w atorvastatin, amiodarone, metoprolol, ASA, plavix, monitored on telemetry   DM2-accuchecks, c/w lantus, ISS, jardiance held   GERD-PPI  Depression-c/w venlafaxine   Constipation-laxatives and stool softeners added    Pt. was HDS and discharged to [facility] on 2/15/24. On 2/18/24, patient was sent to the ER for lethargy and hypoxia. Work-up in ER: D-Dimer 13,011, BUN 58, Cr 2.88, WBC 6.7, Hgb 8.0, Trop 43, BNP 1,444, CXR showed stable pleural and parenchymal opacities in the mid and lower right hemithorax and a mild new left pleural effusion with left basilar atelectasis or edema, SpO2 82% on RA. Pt. was given Lasix 80 mg. US of BLE was negative for DVT. Pt. was placed on BiPAP and transferred to [facility] for further evaluation and treatment. Hospital course:     Acute on chronic hypoxic/hypercapnic respiratory failure 2/2 Influenza B, COPD exacerbation, and decompensated heart failure-BiPAP --> high-flow O2 --> supplemental O2, tamiflu, prednisone, duonebs, diuresis --> dialysis, pulmonology and cardiology consulted   Acute on chronic diastolic CHF-strict I&O, daily weight, 2 gm Na+ diet, c/w metoprolol, cardiology consult   Elevated D-Dimer-US negative for DVT, VQ scan showed low probability of PE  BLE edema/redness-possibly underlying cellulitis, leg elevation, local wound care, IV vancomycin  Acute on chronic anemia-required blood transfusion, iron started, CBC monitored   ANDREA on CKD-nephrology consulted, TDC placed to R chest on 3/1 by general surgery, dialysis started    Pt. was HDS and discharged back to [facility] on 3/5/24. On 3/7, patient was reported  "to have increased BLE edema with fluid-filled blisters and c/o pain. Venous US was ordered, which was negative for DVT. On 3/9, patient was at dialysis and was observed to be confused and c/o dizziness. SpO2 was 66% and she was placed on 5 L of O2. Pt. refused to go to the ER, but eventually agreed to evaluation. Work-up in ER: CXR showed a right basilar opacity, Flu negative, COVID negative, RSV negative. Pt. was discharged back to SNF on Levaquin for pneumonia.     On 3/13, routine blood work revealed Hgb of 6.4. Pt. to be set-up for outpatient blood transfusion at [facility]. On 3/14, staff reported patient with low BP (77/58) and lethargy. She was sent to [facility] ER for evaluation. In the ER, /114, HR 65, RR 28, SpO2 95%. Labs were drawn, but the ER discharged patient to dialysis prior to the labs resulting. Transport reported that patient was very lethargic and drowsy on the ride to dialysis to the point where he almost took the patient back to the ER. Upon review of the ER labs, Glu was 24 and Hgb was 6.9. When patient arrived to dialysis, blood sugar was checked and was 47. She was given 1 tube of glucose and a snack with improvement noted in blood sugar to 147 as well as resolution of lethargy.     Patient is s/p blood transfusion on 3/15. Today, she denies dizziness, HA, vision changes, SOB, cough, or chest pain. She reports improvement in generalized edema. She continues to report BLE pain, that is described as \"burning\". She reports improvement in appetite. Blood sugars reviewed in chart. Staff report no other clinical concerns at this time.     ROS:    As above in HPI. Otherwise, all other systems have been reviewed and are negative for complaint.    Medications reviewed and verified in NH chart.     Patient Active Problem List   Diagnosis   • Hypothyroidism   • Acute on chronic diastolic heart failure (Multi)   • Altered mental status   • Anxiety   • Chronic fatigue   • Constipation by delayed " colonic transit   • Chronic obstructive pulmonary disease (Multi)   • Type 2 diabetes mellitus (Multi)   • Diabetic renal disease (Multi)   • Gastroesophageal reflux disease   • History of renal cell carcinoma   • Hypertension   • Peripheral vascular disease (CMS-HCC)   • Leukocytosis   • Lung nodule   • Major depressive disorder, single episode, unspecified   • Osteoarthritis   • Paroxysmal atrial fibrillation (Multi)   • Primary insomnia   • Stage 3b chronic kidney disease (CKD) (Multi)   • Trouble walking   • Hyperlipidemia, unspecified   • Anemia due to chronic kidney disease, on chronic dialysis (Multi)   • ANDREA (acute kidney injury) (CMS-HCC)   • ASHD (arteriosclerotic heart disease)   • At risk for falling   • Essential tremor   • Vitamin D deficiency   • Acute on chronic respiratory failure with hypoxia (Multi)   • Pulmonary hypertension (Multi)   • Pulmonary edema ([facility])   • Influenza B   • History of right nephrectomy   • Cognitive impairment        Past Medical History:   Diagnosis Date   • Anal fissure 10/12/2023   • Anemia    • ASHD (arteriosclerotic heart disease)    • At risk for falls    • Atrial fibrillation (Multi)    • CHF (congestive heart failure) (Multi)    • CKD (chronic kidney disease)    • COPD (chronic obstructive pulmonary disease) (Multi)    • CVA (cerebral vascular accident) (Multi)     2021- right-sided weakness   • Depression    • Diabetes mellitus (Multi)    • GERD (gastroesophageal reflux disease)    • H/O pleural effusion    • Hyperlipidemia    • Hypertension    • Hypothyroidism    • Hypoxia    • Impacted cerumen, left ear     Impacted cerumen of left ear   • Noncompliance    • Osteoarthritis    • Perianal dermatitis 10/12/2023   • Personal history of other diseases of the respiratory system     History of acute bronchitis   • PVD (peripheral vascular disease) (CMS-HCC)    • Renal carcinoma, right (Multi)     s/p partial nephrectomy 8/2021   • Respiratory failure (Multi)    • TIA  (transient ischemic attack)    • Vasculitis (CMS-HCC) 10/12/2023   • Weakness        Past Surgical History:   Procedure Laterality Date   • ANKLE SURGERY         • CARDIAC CATHETERIZATION N/A 2024    Procedure: Right Heart Cath;  Surgeon: Nicholas Antonio MD;  Location: [facility] Cardiac Cath Lab;  Service: Cardiovascular;  Laterality: N/A;   • CATARACT EXTRACTION Right     2019   •  SECTION, CLASSIC     • MR CHEST ANGIO W AND WO IV CONTRAST  2023    MR CHEST ANGIO W AND WO IV CONTRAST 2023 [facility] MRI   • MR HEAD ANGIO WO IV CONTRAST  2021    MR HEAD ANGIO WO IV CONTRAST [facility]   • NEPHRECTOMY  2021   • SHOULDER SURGERY             Family History   Problem Relation Name Age of Onset   • Hypertension Mother     • Diabetes Father     • Heart disease Father     • Cancer Sister     • Cancer Brother     • Diabetes Daughter     • Asthma Daughter     • Heart attack Daughter     • Diabetes Maternal Grandfather     • Heart disease Paternal Grandmother     • Diabetes Other     • Hypertension Other     • COPD Other         Social History     Tobacco Use   Smoking Status Former   • Current packs/day: 0.50   • Average packs/day: 0.5 packs/day for 55.3 years (27.7 ttl pk-yrs)   • Types: Cigarettes   • Start date: 1969   • Passive exposure: Never   Smokeless Tobacco Never       Social History     Substance and Sexual Activity   Alcohol Use Not Currently       Social History     Substance and Sexual Activity   Drug Use Yes   • Types: Marijuana       Allergies   Allergen Reactions   • Citalopram Other, Rash and Hives     Facial breakout, blisters, and rash   • Adhesive Tape-Silicones Other     blisters   • Amoxicillin Swelling   • Bee Venom Protein (Honey Bee) Swelling   • Codeine Headache and Other     Migraines   • Latex Other     blisters   • Lisinopril Swelling     Facial swelling   • Prednisone Other     Yeast infection   • Tuberculin Ppd Unknown   • Doxycycline  Rash   • Oseltamivir Rash   • Phenyleph-Shark Oil-Glyc-Pet Rash   • Phenylephrine Nausea/vomiting        Vital Signs:   109/58-64-19-97.6-97% on RA     Physical Exam:  General: Sitting up in WC in NAD, alert   Head/Face: NCAT, symmetrical  Eyes: PERRLA, no injection, no discharge  ENT: Hearing not impaired, ears without scars or lesions, nasal mucosa and turbinates pink, septum midline, lips pink and moist  Neck: Supple, symmetrical  Respiratory: CTA but diminished without adventitious sounds, respirations even and nonlabored without use of accessory muscles, good air exchange  Cardio: RRR without murmur or gallops, normal S1S2, + anasarca (improved), BLE NP edema (improved), pedal pulses 2+/4 bilaterally  Chest/Breast: Symmetrical, tunneled dialysis cath to R chest   GI: BS x 4, normoactive, non-distended, abd round and soft, no masses or tenderness  : No suprapubic tenderness or distention  MSK: Gait not assessed, joints with full ROM without pain or contractures  Skin: Skin warm and dry, no induration, tubi- intact to BLE  Neurologic: Cranial nerves II through XII intact, superficial touch and pain sensation intact  Psychiatric: Alert to person, place, and time, calm and cooperative     Results/Data:   3/18/24: BUN 38, Cr 2.41, GFR 21, Na+ 136, K+ 3.5, Kel 7.4, Hgb 7.3, Hct 23.8, Plt 175  3/14/24: Glu 24, Hgb 6.9  3/9/24: Glu 95, K+ 3.4, CO2 33, BUN 7, Cr 0.84, GFR 75, Kel 7.6, Alb 2.6, Hgb 7.9, Hct 27.0  3/6/24: HgbA1C 7.7, Chol 147, , HDL 65, LDL 51    Assessment/Plan:  Acute on chronic respiratory failure with hypoxia and hypercapnia 2/2 pneumonia/COPD exacerbation/acute on chronic HFpEF/right pleural effusion-c/w supplemental O2 as needed, 2 gm Na+ diet, monitor weights, 1500 ml fluid restriction, s/p thoracentesis (2/2/24) with 2.3 L of fluid removed, c/w dialysis as scheduled, s/p Levaquin (end date 3/18), mikaela prn, monitor respiratory status closely  Physical deconditioning/generalized  weakness-c/w PT/OT, safety and fall precautions   ANDREA on CKD-in setting of acute decompensated heart failure, avoid nephrotoxic drugs, c/w dialysis, monitor R chest dialysis catheter site, monitor renal function, F/U with nephrologist as scheduled  AFib/CAD/HTN/HLD-s/p Watchman procedure (Aug. 2023), s/p RHC (2/27/24) which showed mild pulmonary hypertension, c/w amiodarone, aspirin, atorvastatin, metoprolol, and plavix, monitor BP and HR, F/U with cardiologist as scheduled  Hypokalemia-monitor, replete as needed  Anemia-s/p 1 unit of PRBCs on 3/15, c/w iron supplement, monitor CBC, transfuse for Hgb less than 7.0  GERD-c/w PPI  Depression-c/w venlafaxine (increased to 150 mg daily on 3/11 per psych), supportive care, Psych following  DM2-LCS diet, accuchecks, c/w humalog ISS with meals, c/w basaglar 10 units daily, HS snack, BP and lipid control, yearly eye exam, diabetic foot care, monitor HgbA1C  BLE edema/pain-elevate as tolerated, tubi- to BLE, local wound care, c/w Tylenol ES 1000 mg BID, consider gabapentin for neuropathy, monitor closely  Influenza B-RESOLVED, positive on 2/19/24, treated with Tamiflu  Hypoalbuminemia-increase protein intake, monitor albumin level, RD consult  Hypothyroidism/left thyroid nodule-c/w levothyroxine, monitor TSH and FT4, F/U with PCP after discharge for thyroid ultrasound    Orders:  NNO    Code Status:   Full Code      Electronically Signed By: CARLENE Olivares-CNP   4/21/24 11:10 AM

## 2024-03-26 NOTE — ED PROVIDER NOTES
Chief Complaint: Hypotension  HPI: This is a 68-year-old female, past medical history significant for CHF, CKD, currently on dialysis every Tuesday, Thursday, Saturday, diabetes, hypertension, presenting to the emergency department from her nursing home by EMS for concern of hypotension.  Per nursing home report, the patient had a hemoglobin level of 6.4 on their labs.  She was scheduled for dialysis today where they can give her blood, however when the nurse at the nursing home took her vitals, and they found her to be hypotensive with blood pressures 70s systolic, so they sent her to the emergency department.  Patient does not have any specific complaints at this time apart from some shortness of breath which is consistent with needing her dialysis treatment.    Past Medical History:   Diagnosis Date    Acute CHF (CMS/[facility])     04/2023    ANDREA (acute kidney injury) (CMS/[facility])     Anemia     Arthritis     ASHD (arteriosclerotic heart disease)     At risk for falling     Atrial fibrillation (CMS/[facility])     Cancer of kidney (CMS/[facility])     CHF (congestive heart failure) (CMS/[facility])     Chronic diastolic CHF (congestive heart failure) (CMS/[facility])     CKD (chronic kidney disease)     Class 2 obesity with body mass index (BMI) of 35.0 to 35.9 in adult 02/19/2024    35.26 kg/m²    COPD (chronic obstructive pulmonary disease) (CMS/[facility])     CVA (cerebral vascular accident) (CMS/[facility])     2021- righ sided weakness    Depression     Diabetes (CMS/[facility])     End-stage renal disease on hemodialysis (CMS/[facility])     Essential tremor     GERD (gastroesophageal reflux disease)     HTN (hypertension)     Hyperlipidemia     Hypothyroidism     s/p radioactive iodine treatment    Hypoxia     Impacted cerumen, left ear     Impacted cerumen of left ear    Influenza B     Left ventricular ejection fraction greater than 40%     40-45%    Noncompliance     Osteoarthritis     Personal history of other diseases of the respiratory system     History of acute  bronchitis    Pulmonary edema     Pulmonary hypertension (CMS/HCC)     PVD (peripheral vascular disease) (CMS/[facility])     Renal carcinoma, right (CMS/[facility])     s/p partial nephrectomy 2021    Respiratory failure (CMS/[facility])     Stage 3b chronic kidney disease (CKD) (CMS/[facility])     TIA (transient ischemic attack)     Vitamin D deficiency     Weakness       Past Surgical History:   Procedure Laterality Date    ANKLE SURGERY          CARDIAC CATHETERIZATION N/A 2024    Procedure: Right Heart Cath;  Surgeon: Nicholas Antonio MD;  Location: [facility] Cardiac Cath Lab;  Service: Cardiovascular;  Laterality: N/A;    CATARACT EXTRACTION Right     2019     SECTION, CLASSIC      MR CHEST ANGIO W AND WO IV CONTRAST  2023    MR CHEST ANGIO W AND WO IV CONTRAST 2023 [facility] MRI    MR HEAD ANGIO WO IV CONTRAST  2021    MR HEAD ANGIO WO IV CONTRAST [facility]    NEPHRECTOMY  2021    SHOULDER SURGERY             Physical Exam  Constitutional:       Appearance: Normal appearance.      Comments: Chronically ill-appearing, appears much older than stated age.   HENT:      Head: Normocephalic and atraumatic.      Mouth/Throat:      Mouth: Mucous membranes are moist.   Eyes:      Conjunctiva/sclera: Conjunctivae normal.   Cardiovascular:      Rate and Rhythm: Normal rate.   Pulmonary:      Effort: Pulmonary effort is normal.   Abdominal:      General: Abdomen is flat.      Palpations: Abdomen is soft.   Musculoskeletal:         General: Normal range of motion.      Cervical back: Normal range of motion.   Skin:     General: Skin is warm and dry.   Neurological:      General: No focal deficit present.      Mental Status: She is alert.   Psychiatric:         Mood and Affect: Mood normal.            ED Course/MDM  Diagnoses as of 24 1334   Anemia, unspecified type   Dialysis patient (CMS/[facility])       This is a 68 y.o. female presenting to the ED from her nursing home by EMS for concern  of hypotension and anemia.  Patient is scheduled for dialysis today, and is known to be anemic, with plan to receive blood at the dialysis center, however the nurse at the nursing home took her blood pressure several times and was concern for hypotension with systolics in the 70s.  Patient does not have any specific complaints at this time apart from some mild shortness of breath which is consistent with needing her dialysis.  On physical exam, the patient is chronically ill-appearing, appears much older than stated age, however is in no acute distress.  Lab work was concerning for hemoglobin of 6.9 as well as a glucose of 24, however this was the squad was labs, and her sugar on point-of-care was not 24, mentation was appropriate.  Blood pressure remained stable to elevated during her stay in the emergency department.  I do feel that she is safe and stable for dialysis, where she can receive blood as previously scheduled.  I spoke with the nursing home and the dialysis center, and we are able to arrange a ride to get her directly to dialysis and then back to the nursing home.  She was discharged home in stable condition.    Final Impression  1.  Anemia  2.  Dialysis patient  Disposition/Plan: Discharge  Condition at disposition: Stable.     Deanan Stearns DO  Emergency Medicine Physician     Deanna Stearns,   03/26/24 [id number]

## 2024-03-26 NOTE — PROGRESS NOTES
PLACE OF SERVICE:  [facility]    This is a subsequent visit.    Subjective   Patient ID: Daphne Morris is a 68 y.o. female who presents for Follow-up.    Ms. Cecelia Morris is a 68-year-old female with history of congestive heart failure as well as COPD.  She has had a prior stroke affecting her right side.  She requires supportive care.    Review of Systems   Constitutional:  Negative for chills and fever.   Cardiovascular:  Negative for chest pain.   All other systems reviewed and are negative.    Objective   /78   Pulse 82   Temp 36.7 °C (98.1 °F)   Resp 18     Physical Exam  Vitals reviewed.   Constitutional:       General: She is not in acute distress.     Comments: This is a well-developed, well-nourished female, sitting in a chair   HENT:      Right Ear: Tympanic membrane, ear canal and external ear normal.      Left Ear: Tympanic membrane, ear canal and external ear normal.   Eyes:      General: No scleral icterus.     Pupils: Pupils are equal, round, and reactive to light.   Neck:      Vascular: No carotid bruit.   Cardiovascular:      Heart sounds: Normal heart sounds, S1 normal and S2 normal. No murmur heard.     No friction rub.   Pulmonary:      Breath sounds: Decreased breath sounds (throughout) present.   Abdominal:      Palpations: There is no hepatomegaly, splenomegaly or mass.   Musculoskeletal:         General: No swelling or deformity. Normal range of motion.      Cervical back: Neck supple.      Right lower leg: Edema present.      Left lower leg: Edema present.   Lymphadenopathy:      Cervical: No cervical adenopathy.      Upper Body:      Right upper body: No axillary adenopathy.      Left upper body: No axillary adenopathy.      Lower Body: No right inguinal adenopathy. No left inguinal adenopathy.   Neurological:      Mental Status: She is oriented to person, place, and time.      Cranial Nerves: Cranial nerves 2-12 are intact. No cranial  nerve deficit.      Sensory: No sensory deficit.      Motor: Motor function is intact. No weakness.      Gait: Gait is intact.      Deep Tendon Reflexes: Reflexes normal.   Psychiatric:         Mood and Affect: Mood normal. Mood is not anxious or depressed. Affect is not angry.         Behavior: Behavior is not agitated.         Thought Content: Thought content normal.         Judgment: Judgment normal.     LAB WORK:  Laboratory studies reviewed.    Assessment/Plan   Problem List Items Addressed This Visit             ICD-10-CM       Cardiac and Vasculature    Hypertension I10       Endocrine/Metabolic    Hypothyroidism E03.9    Type 2 diabetes mellitus (CMS/[facility]) E11.9       Gastrointestinal and Abdominal    Gastroesophageal reflux disease K21.9       Hematology and Neoplasia    Absolute anemia D64.9       Mental Health    Depression F32.A     Other Visit Diagnoses         Codes    Congestive heart failure, unspecified HF chronicity, unspecified heart failure type (CMS/[facility])    -  Primary I50.9    COPD on long-term inhaled steroid therapy (CMS/HCC)     J44.9, Z79.51    Hypoxia     R09.02    Cerebrovascular accident (CVA), unspecified mechanism (CMS/[facility])     I63.9    Chronic kidney disease, unspecified CKD stage     N18.9    Atrial fibrillation, unspecified type (CMS/[facility])     I48.91        1. Heart failure, on diuretic.  2. COPD, on bronchodilator therapy.  3. Hypoxia, on oxygen.  4. CVA, on supportive care.  5. Diabetes, on insulin.  6. Hypertension, med controlled.  7. CKD, monitor BMP.  8. Reflux disease, on PPI.  9. Anemia, follow CBC.  10. Atrial fibrillation, rate controlled.  11. Hypothyroidism, on levothyroxine.  12. Depression, on medication.    Scribe Attestation  By signing my name below, IAnn Scribe attest that this documentation has been prepared under the direction and in the presence of Cale Lowe MD.     All medical record entries made by the luna were personally dictated by me I have  reviewed the chart and agree the record accurately reflects my personal performance of his history physical examination and management

## 2024-03-27 LAB
ACID FAST STN SPEC: NORMAL
MYCOBACTERIUM SPEC CULT: NORMAL

## 2024-03-29 PROBLEM — J96.21 ACUTE ON CHRONIC RESPIRATORY FAILURE WITH HYPOXIA (MULTI): Status: ACTIVE | Noted: 2024-03-29

## 2024-03-29 PROBLEM — R41.89 COGNITIVE IMPAIRMENT: Status: ACTIVE | Noted: 2024-03-29

## 2024-03-29 PROBLEM — J10.1 INFLUENZA B: Status: ACTIVE | Noted: 2024-03-29

## 2024-03-29 PROBLEM — I25.10 ASHD (ARTERIOSCLEROTIC HEART DISEASE): Status: ACTIVE | Noted: 2024-03-29

## 2024-03-29 PROBLEM — J96.90 RESPIRATORY FAILURE (MULTI): Status: ACTIVE | Noted: 2024-03-29

## 2024-03-29 PROBLEM — I13.0: Status: RESOLVED | Noted: 2023-10-12 | Resolved: 2024-03-29

## 2024-03-29 PROBLEM — I77.6 VASCULITIS (CMS-HCC): Status: RESOLVED | Noted: 2023-10-12 | Resolved: 2024-03-29

## 2024-03-29 PROBLEM — Z90.5 HISTORY OF RIGHT NEPHRECTOMY: Status: ACTIVE | Noted: 2024-03-29

## 2024-03-29 PROBLEM — J81.1 PULMONARY EDEMA (HHS-HCC): Status: ACTIVE | Noted: 2024-03-29

## 2024-03-29 PROBLEM — E55.9 VITAMIN D DEFICIENCY: Status: ACTIVE | Noted: 2024-03-29

## 2024-03-29 PROBLEM — K60.2 ANAL FISSURE: Status: RESOLVED | Noted: 2023-10-12 | Resolved: 2024-03-29

## 2024-03-29 PROBLEM — I50.33 ACUTE ON CHRONIC DIASTOLIC HEART FAILURE (MULTI): Status: ACTIVE | Noted: 2023-10-12

## 2024-03-29 PROBLEM — I27.20 PULMONARY HYPERTENSION (MULTI): Status: ACTIVE | Noted: 2024-03-29

## 2024-03-29 PROBLEM — G25.0 ESSENTIAL TREMOR: Status: ACTIVE | Noted: 2024-03-29

## 2024-03-29 PROBLEM — Z91.81 AT RISK FOR FALLING: Status: ACTIVE | Noted: 2024-03-29

## 2024-03-29 PROBLEM — L30.9 PERIANAL DERMATITIS: Status: RESOLVED | Noted: 2023-10-12 | Resolved: 2024-03-29

## 2024-03-29 PROBLEM — N17.9 AKI (ACUTE KIDNEY INJURY) (CMS-HCC): Status: ACTIVE | Noted: 2024-03-29

## 2024-03-29 PROBLEM — I50.32 CHRONIC DIASTOLIC CHF (CONGESTIVE HEART FAILURE) (MULTI): Status: ACTIVE | Noted: 2024-03-29

## 2024-03-30 ENCOUNTER — NURSING HOME VISIT (OUTPATIENT)
Dept: POST ACUTE CARE | Facility: EXTERNAL LOCATION | Age: 69
End: 2024-03-30
Payer: MEDICARE

## 2024-03-30 DIAGNOSIS — Z91.81 AT RISK FOR FALLING: ICD-10-CM

## 2024-03-30 DIAGNOSIS — I48.91 ATRIAL FIBRILLATION, UNSPECIFIED TYPE (MULTI): ICD-10-CM

## 2024-03-30 DIAGNOSIS — E03.9 HYPOTHYROIDISM, UNSPECIFIED TYPE: ICD-10-CM

## 2024-03-30 DIAGNOSIS — Z79.4 TYPE 2 DIABETES MELLITUS WITHOUT COMPLICATION, WITH LONG-TERM CURRENT USE OF INSULIN (MULTI): ICD-10-CM

## 2024-03-30 DIAGNOSIS — D64.9 ANEMIA, UNSPECIFIED TYPE: ICD-10-CM

## 2024-03-30 DIAGNOSIS — I10 HYPERTENSION, UNSPECIFIED TYPE: ICD-10-CM

## 2024-03-30 DIAGNOSIS — R09.02 HYPOXIA: ICD-10-CM

## 2024-03-30 DIAGNOSIS — I50.9 CONGESTIVE HEART FAILURE, UNSPECIFIED HF CHRONICITY, UNSPECIFIED HEART FAILURE TYPE (MULTI): Primary | ICD-10-CM

## 2024-03-30 DIAGNOSIS — J44.9 COPD ON LONG-TERM INHALED STEROID THERAPY (MULTI): ICD-10-CM

## 2024-03-30 DIAGNOSIS — I63.9 CEREBROVASCULAR ACCIDENT (CVA), UNSPECIFIED MECHANISM (MULTI): ICD-10-CM

## 2024-03-30 DIAGNOSIS — E78.5 HYPERLIPIDEMIA, UNSPECIFIED HYPERLIPIDEMIA TYPE: ICD-10-CM

## 2024-03-30 DIAGNOSIS — E11.9 TYPE 2 DIABETES MELLITUS WITHOUT COMPLICATION, WITH LONG-TERM CURRENT USE OF INSULIN (MULTI): ICD-10-CM

## 2024-03-30 DIAGNOSIS — Z79.51 COPD ON LONG-TERM INHALED STEROID THERAPY (MULTI): ICD-10-CM

## 2024-03-30 DIAGNOSIS — M19.90 OSTEOARTHRITIS, UNSPECIFIED OSTEOARTHRITIS TYPE, UNSPECIFIED SITE: ICD-10-CM

## 2024-03-30 DIAGNOSIS — R53.1 WEAKNESS: ICD-10-CM

## 2024-03-30 DIAGNOSIS — F32.A DEPRESSION, UNSPECIFIED DEPRESSION TYPE: ICD-10-CM

## 2024-03-30 PROCEDURE — 99309 SBSQ NF CARE MODERATE MDM 30: CPT | Performed by: INTERNAL MEDICINE

## 2024-03-30 NOTE — PROGRESS NOTES
Chief Complaint:   SNF F/U  Acute on chronic diastolic heart failure  Physical deconditioning/weakness  Acute hypoxic respiratory failure   Pleural effusion  ANDREA  Cardiorenal syndrome     HPI:   68 year-old female presenting to [facility] ER on 2/1/24 with shortness of breath, difficulty ambulating, generalized weakness, and BLE edema. Work-up in ER: /63, HR 53, Temp 36.6, RR 20, SpO2 95%, EKG showed sinus bradycardia, CXR showed a new large right pleural effusion and basilar atelectasis, underlying pneumonia is not excluded, CT of chest showed large right pleural effusion with complete collapse of the RLL, atelectasis/consolidation at the RUL and RML, tree-in-bud airspace opacities at the RUL and LLL, and left thyroid gland nodule, UA + glucose and protein, Hgb 9.5, Hct 32.8, Glu 220, K+ 5.4, BUN 49, Cr 3.25, BNP 1,786. Case was discussed with cardiology and nephrology, who recommended IV diuresis. Pt. was started on IV diuretics and IV ATB and admitted to [facility] for further evaluation and treatment. Hospital course:    Acute hypoxic hypercapnic respiratory failure 2/2 CAP/COPD exacerbation/acute on chronic heart failure/large right pleural effusion-2 gm Na+ diet, I&O, daily weights, BiPAP --> supplemental O2, IV lasix, IV solu-medrol, IV Rocephin, IV Azithromycin, duonebs, cardiology consult, pulmonology consult, underwent thoracentesis on 2/2 (2300 ml of clear pleural fluid was obtained), TTE showed normal LVSF with 50-56% EF, impaired relaxation pattern of left ventricular diastolic filling, mildly reduced right ventricular systolic function, underwent RHC on 2/7 which showed mild pulmonary hypertension, diuretic changed to PO upon discharge, patient to F/U with cardiology after discharge  ANDREA-nephrology consult, renal US negative for left hydronephrosis, cardiorenal syndrome suspected 2/2 acute decompensated CHF, F/U with nephrologist after discharge (Dr. Mitchell)  Generalized weakness-PT/OT  evaluations, recommending SNF   Left knee pain-XR showed small joint effusion and degenerative changes, reticular increased density in the subcutaneous tissues which may represent lymphedema and/or cellulitis, Tylenol prn for pain   AFib/CAD/HLD-s/p Watchman in August 2023, c/w atorvastatin, amiodarone, metoprolol, ASA, plavix, monitored on telemetry   DM2-accuchecks, c/w lantus, ISS, jardiance held   GERD-PPI  Depression-c/w venlafaxine   Constipation-laxatives and stool softeners added    Pt. was HDS and discharged to [facility] on 2/15/24. On 2/18/24, patient was sent to the ER for lethargy and hypoxia. Work-up in ER: D-Dimer 13,011, BUN 58, Cr 2.88, WBC 6.7, Hgb 8.0, Trop 43, BNP 1,444, CXR showed stable pleural and parenchymal opacities in the mid and lower right hemithorax and a mild new left pleural effusion with left basilar atelectasis or edema, SpO2 82% on RA. Pt. was given Lasix 80 mg. US of BLE was negative for DVT. Pt. was placed on BiPAP and transferred to [facility] for further evaluation and treatment. Hospital course:     Acute on chronic hypoxic/hypercapnic respiratory failure 2/2 Influenza B, COPD exacerbation, and decompensated heart failure-BiPAP --> high-flow O2 --> supplemental O2, tamiflu, prednisone, duonebs, diuresis --> dialysis, pulmonology and cardiology consulted   Acute on chronic diastolic CHF-strict I&O, daily weight, 2 gm Na+ diet, c/w metoprolol, cardiology consult   Elevated D-Dimer-US negative for DVT, VQ scan showed low probability of PE  BLE edema/redness-possibly underlying cellulitis, leg elevation, local wound care, IV vancomycin  Acute on chronic anemia-required blood transfusion, iron started, CBC monitored   ANDREA on CKD-nephrology consulted, TDC placed to R chest on 3/1 by general surgery, dialysis started    Pt. was HDS and discharged back to [facility] on 3/5/24. On 3/7, patient was reported to have increased BLE edema with fluid-filled blisters and c/o pain.  "Venous US was ordered, which was negative for DVT. On 3/9, patient was at dialysis and was observed to be confused and c/o dizziness. SpO2 was 66% and she was placed on 5 L of O2. Pt. refused to go to the ER, but eventually agreed to evaluation. Work-up in ER: CXR showed a right basilar opacity, Flu negative, COVID negative, RSV negative. Pt. was discharged back to SNF on Levaquin for pneumonia.     Today, patient reports generalized edema and states that she is \"seeping\" clear fluid from her arms and legs. There are no s/s of infection. She denies any shortness of breath, cough, chest pain or palpitations, fevers, chills, or dizziness. She is c/o BLE pain, that is described as \"burning\". She reports improvement in appetite. Staff report no other clinical concerns at this time.     ROS:    As above in HPI. Otherwise, all other systems have been reviewed and are negative for complaint.    Medications reviewed and verified in NH chart.     Patient Active Problem List   Diagnosis    Hypothyroidism    Acute on chronic diastolic heart failure (CMS/[facility])    Altered mental status    Anxiety    Chronic fatigue    Constipation by delayed colonic transit    Chronic obstructive pulmonary disease (CMS/[facility])    Type 2 diabetes mellitus (CMS/[facility])    Diabetic renal disease (CMS/[facility])    Gastroesophageal reflux disease    History of renal cell carcinoma    Hypertension    Peripheral vascular disease (CMS/[facility])    Leukocytosis    Lung nodule    Major depressive disorder, single episode, unspecified    Osteoarthritis    Paroxysmal atrial fibrillation (CMS/[facility])    Primary insomnia    Stage 3b chronic kidney disease (CKD) (CMS/[facility])    Trouble walking    Hyperlipidemia, unspecified    Anemia due to chronic kidney disease, on chronic dialysis (CMS/[facility])    ANDREA (acute kidney injury) (CMS/[facility])    ASHD (arteriosclerotic heart disease)    At risk for falling    Essential tremor    Vitamin D deficiency    Acute on chronic respiratory failure with " hypoxia (CMS/[facility])    Pulmonary hypertension (CMS/[facility])    Pulmonary edema    Influenza B    History of right nephrectomy    Cognitive impairment        Past Medical History:   Diagnosis Date    Anal fissure 10/12/2023    CVA (cerebral vascular accident) (CMS/[facility])     - right-sided weakness    Hypoxia     Impacted cerumen, left ear     Impacted cerumen of left ear    Noncompliance     Perianal dermatitis 10/12/2023    Personal history of other diseases of the respiratory system     History of acute bronchitis    Renal carcinoma, right (CMS/HCC)     s/p partial nephrectomy 2021    TIA (transient ischemic attack)     Vasculitis (CMS/[facility]) 10/12/2023       Past Surgical History:   Procedure Laterality Date    ANKLE SURGERY          CARDIAC CATHETERIZATION N/A 2024    Procedure: Right Heart Cath;  Surgeon: Nicholas Antonio MD;  Location: [facility] Cardiac Cath Lab;  Service: Cardiovascular;  Laterality: N/A;    CATARACT EXTRACTION Right     2019     SECTION, CLASSIC      MR CHEST ANGIO W AND WO IV CONTRAST  2023    MR CHEST ANGIO W AND WO IV CONTRAST 2023 [facility] MRI    MR HEAD ANGIO WO IV CONTRAST  2021    MR HEAD ANGIO WO IV CONTRAST [facility]    NEPHRECTOMY  2021    SHOULDER SURGERY      2009       Family History   Problem Relation Name Age of Onset    Hypertension Mother      Diabetes Father      Heart disease Father      Cancer Sister      Cancer Brother      Diabetes Daughter      Asthma Daughter      Heart attack Daughter      Diabetes Maternal Grandfather      Heart disease Paternal Grandmother      Diabetes Other      Hypertension Other      COPD Other         Social History     Tobacco Use   Smoking Status Former    Packs/day: 0.50    Years: 50.00    Additional pack years: 0.00    Total pack years: 25.00    Types: Cigarettes    Start date: 1969    Passive exposure: Never   Smokeless Tobacco Never       Social History     Substance and Sexual Activity    Alcohol Use Not Currently       Social History     Substance and Sexual Activity   Drug Use Yes    Types: Marijuana       Allergies   Allergen Reactions    Citalopram Other, Rash and Hives     Facial breakout, blisters, and rash    Adhesive Tape-Silicones Other     blisters    Amoxicillin Swelling    Bee Venom Protein (Honey Bee) Swelling    Codeine Headache and Other     Migraines    Latex Other     blisters    Lisinopril Swelling     Facial swelling    Prednisone Other     Yeast infection    Tuberculin Ppd Unknown    Doxycycline Rash    Oseltamivir Rash    Phenyleph-Shark Oil-Glyc-Pet Rash    Phenylephrine Nausea/vomiting        Vital Signs:   135/77-77-18-98.9-93% on RA     Physical Exam:  General: Sitting up in WC in NAD, alert   Head/Face: NCAT, symmetrical  Eyes: PERRLA, no injection, no discharge  ENT: Hearing not impaired, ears without scars or lesions, nasal mucosa and turbinates pink, septum midline, lips pink and moist  Neck: Supple, symmetrical  Respiratory: CTA but diminished with faint rales noted to RLL, respirations even and nonlabored without use of accessory muscles, good air exchange  Cardio: RRR without murmur or gallops, normal S1S2, + anasarca, BLE NP edema, pedal pulses 2+/4 bilaterally  Chest/Breast: Symmetrical, tunneled dialysis cath to R chest   GI: BS x 4, normoactive, non-distended, abd round and soft, no masses or tenderness  : No suprapubic tenderness or distention  MSK: Gait not assessed, joints with full ROM without pain or contractures, + generalized weakness  Skin: Skin warm and dry, no induration, + clear drainage noted from BUE and BLE, tubi- intact to BLE  Neurologic: Cranial nerves II through XII intact, superficial touch and pain sensation intact  Psychiatric: Alert to person, place, and time, calm and cooperative     Results/Data:   3/9/24: Glu 95, K+ 3.4, CO2 33, BUN 7, Cr 0.84, GFR 75, Kel 7.6, Alb 2.6, Hgb 7.9, Hct 27.0  3/6/24: HgbA1C 7.7, Chol 147, , HDL  65, LDL 51    Assessment/Plan:  Acute on chronic respiratory failure with hypoxia and hypercapnia 2/2 pneumonia/COPD exacerbation/acute on chronic HFpEF/right pleural effusion-c/w supplemental O2 as needed, 2 gm Na+ diet, monitor weights, 1500 ml fluid restriction, s/p thoracentesis (2/2/24) with 2.3 L of fluid removed, c/w dialysis as scheduled, c/w Levaquin (end date 3/18), duonebs prn, monitor respiratory status closely  Physical deconditioning/generalized weakness-c/w PT/OT, safety and fall precautions   ANDREA on CKD-in setting of acute decompensated heart failure, avoid nephrotoxic drugs, c/w dialysis, monitor R chest dialysis catheter site, monitor renal function, F/U with nephrologist as scheduled  AFib/CAD/HTN/HLD-s/p Watchman procedure (Aug. 2023), s/p RHC (2/27/24) which showed mild pulmonary hypertension, c/w amiodarone, aspirin, atorvastatin, metoprolol, and plavix, monitor BP and HR, F/U with cardiologist as scheduled  Hypokalemia-monitor, replete as needed  Anemia-c/w iron supplement, monitor CBC, transfuse for Hgb less than 7.0  GERD-c/w PPI  Depression-c/w venlafaxine (increased to 150 mg daily on 3/11 per psych), supportive care, Psych following  DM2-LCS diet, accuchecks, c/w humalog ISS with meals, c/w lantus, BP and lipid control, yearly eye exam, diabetic foot care, monitor HgbA1C  BLE edema/pain-elevate as tolerated, tubi- to BLE, local wound care, start on Tylenol ES 1000 mg BID, consider gabapentin for neuropathy, monitor closely  Influenza B-RESOLVED, positive on 2/19/24, treated with Tamiflu  Hypoalbuminemia-increase protein intake, monitor albumin level, RD consult  Hypothyroidism/left thyroid nodule-c/w levothyroxine, monitor TSH and FT4, F/U with PCP after discharge for thyroid ultrasound    Orders:  Dietician consult for third spacing/low protein   Tylenol ES 1000 mg PO BID    Code Status:   Full Code    Time spent reviewing patient's chart on the unit (PMH, PSH, FH, Social Hx AND  progress and/or consult notes, labs, and radiology results): 50 minutes  Time spent interviewing and/or examining the patient: 20 minutes  Time spent writing note on the unit: 15 minutes  Time spent reviewing POC w/ patient, family, and/or staff: 20 minutes  Total visit time: 105 minutes. Greater than 50% of time was spent on counseling and/or coordination of care of the patient. Start time: 2:03 PM, End time: 3:48 PM.

## 2024-03-30 NOTE — LETTER
Patient: Daphne Morris  : 1955    Encounter Date: 2024    PLACE OF SERVICE:  [facility]    This is a subsequent visit.    Subjective  Patient ID: Daphne Morris is a 68 y.o. female who presents for Follow-up.    Ms. Cecelia Morris is a 68-year-old female with history of congestive heart failure.  She has had a prior stroke affecting her right side.  She is unable to care for herself and requires supportive care.    Review of Systems   Constitutional:  Negative for chills and fever.   Cardiovascular:  Negative for chest pain.   All other systems reviewed and are negative.    Objective  /78   Pulse 80   Temp 36.7 °C (98.1 °F)   Resp 18     Physical Exam  Vitals reviewed.   Constitutional:       General: She is not in acute distress.     Comments: This is a well-developed, well-nourished female, sitting in a chair   HENT:      Right Ear: Tympanic membrane, ear canal and external ear normal.      Left Ear: Tympanic membrane, ear canal and external ear normal.   Eyes:      General: No scleral icterus.     Pupils: Pupils are equal, round, and reactive to light.   Neck:      Vascular: No carotid bruit.   Cardiovascular:      Heart sounds: Normal heart sounds, S1 normal and S2 normal. No murmur heard.     No friction rub.   Pulmonary:      Breath sounds: Decreased breath sounds (throughout) present.   Abdominal:      Palpations: There is no hepatomegaly, splenomegaly or mass.   Musculoskeletal:         General: No swelling or deformity. Normal range of motion.      Cervical back: Neck supple.      Right lower leg: Edema present.      Left lower leg: Edema present.   Lymphadenopathy:      Cervical: No cervical adenopathy.      Upper Body:      Right upper body: No axillary adenopathy.      Left upper body: No axillary adenopathy.      Lower Body: No right inguinal adenopathy. No left inguinal adenopathy.   Neurological:      Mental Status: She is oriented to  person, place, and time.      Cranial Nerves: Cranial nerves 2-12 are intact. No cranial nerve deficit.      Sensory: No sensory deficit.      Motor: Weakness (right-sided) present.      Gait: Gait is intact.      Deep Tendon Reflexes: Reflexes normal.   Psychiatric:         Mood and Affect: Mood normal. Mood is not anxious or depressed. Affect is not angry.         Behavior: Behavior is not agitated.         Thought Content: Thought content normal.         Judgment: Judgment normal.     LAB WORK: Laboratory studies were reviewed.    Assessment/Plan  Problem List Items Addressed This Visit             ICD-10-CM       Advance Directives and General Issues    At risk for falling Z91.81       Cardiac and Vasculature    Hypertension I10    Hyperlipidemia, unspecified E78.5       Endocrine/Metabolic    Hypothyroidism E03.9    Type 2 diabetes mellitus (CMS/[facility]) E11.9       Musculoskeletal and Injuries    Osteoarthritis M19.90     Other Visit Diagnoses         Codes    Congestive heart failure, unspecified HF chronicity, unspecified heart failure type (CMS/[facility])    -  Primary I50.9    COPD on long-term inhaled steroid therapy (CMS/[facility])     J44.9, Z79.51    Cerebrovascular accident (CVA), unspecified mechanism (CMS/[facility])     I63.9    Hypoxia     R09.02    Weakness     R53.1    Depression, unspecified depression type     F32.A    Anemia, unspecified type     D64.9    Atrial fibrillation, unspecified type (CMS/[facility])     I48.91        1. Heart failure, on diuretic.  2. COPD, on bronchodilator therapy.  3. CVA, on supportive care.  4. Hypoxia, on oxygen.  5. Weakness, on PT/OT.  6. Fall risk, on fall precautions.  7. Hypothyroidism, on levothyroxine.  8. Depression, on medication.  9. Hyperlipidemia, on statin.  10. Anemia.  Follow CBC.  11. Hypertension, medically controlled.  12. Atrial fibrillation, on rate control.  13. Diabetes, on insulin.  14. Osteoarthritis, on Tylenol.    Scribe Attestation  By signing my name below, I,  Katarina Santosibisabella attest that this documentation has been prepared under the direction and in the presence of Cale Lowe MD.       All medical record entries made by the scribe were personally dictated by me I have reviewed the chart and agree the record accurately reflects my personal performance of his history physical examination and management      Electronically Signed By: Cale Lowe MD   4/7/24 12:35 AM

## 2024-04-02 ENCOUNTER — NURSING HOME VISIT (OUTPATIENT)
Dept: POST ACUTE CARE | Facility: EXTERNAL LOCATION | Age: 69
End: 2024-04-02
Payer: MEDICARE

## 2024-04-02 VITALS
SYSTOLIC BLOOD PRESSURE: 130 MMHG | TEMPERATURE: 98.1 F | RESPIRATION RATE: 18 BRPM | DIASTOLIC BLOOD PRESSURE: 78 MMHG | HEART RATE: 80 BPM

## 2024-04-02 DIAGNOSIS — E11.9 TYPE 2 DIABETES MELLITUS WITHOUT COMPLICATION, WITH LONG-TERM CURRENT USE OF INSULIN (MULTI): ICD-10-CM

## 2024-04-02 DIAGNOSIS — F32.A DEPRESSION, UNSPECIFIED DEPRESSION TYPE: ICD-10-CM

## 2024-04-02 DIAGNOSIS — I10 HYPERTENSION, UNSPECIFIED TYPE: ICD-10-CM

## 2024-04-02 DIAGNOSIS — D64.9 ANEMIA, UNSPECIFIED TYPE: ICD-10-CM

## 2024-04-02 DIAGNOSIS — R60.1 ANASARCA: ICD-10-CM

## 2024-04-02 DIAGNOSIS — Z79.4 TYPE 2 DIABETES MELLITUS WITHOUT COMPLICATION, WITH LONG-TERM CURRENT USE OF INSULIN (MULTI): ICD-10-CM

## 2024-04-02 DIAGNOSIS — J44.9 COPD ON LONG-TERM INHALED STEROID THERAPY (MULTI): ICD-10-CM

## 2024-04-02 DIAGNOSIS — J96.90 RESPIRATORY FAILURE, UNSPECIFIED CHRONICITY, UNSPECIFIED WHETHER WITH HYPOXIA OR HYPERCAPNIA (MULTI): Primary | ICD-10-CM

## 2024-04-02 DIAGNOSIS — I13.0 CARDIORENAL SYNDROME WITH RENAL FAILURE, STAGE 1-4 OR UNSPECIFIED CHRONIC KIDNEY DISEASE, WITH HEART FAILURE (MULTI): ICD-10-CM

## 2024-04-02 DIAGNOSIS — R53.81 PHYSICAL DECONDITIONING: ICD-10-CM

## 2024-04-02 DIAGNOSIS — I48.91 ATRIAL FIBRILLATION, UNSPECIFIED TYPE (MULTI): ICD-10-CM

## 2024-04-02 DIAGNOSIS — E03.9 HYPOTHYROIDISM, UNSPECIFIED TYPE: ICD-10-CM

## 2024-04-02 DIAGNOSIS — Z79.51 COPD ON LONG-TERM INHALED STEROID THERAPY (MULTI): ICD-10-CM

## 2024-04-02 PROCEDURE — 99309 SBSQ NF CARE MODERATE MDM 30: CPT | Performed by: NURSE PRACTITIONER

## 2024-04-02 ASSESSMENT — ENCOUNTER SYMPTOMS
FEVER: 0
CHILLS: 0

## 2024-04-02 NOTE — LETTER
Patient: Daphne Morris  : 1955    Encounter Date: 2024    Chief Complaint:   SNF F/U  Acute on chronic diastolic heart failure  Physical deconditioning/weakness  Acute hypoxic respiratory failure   Pleural effusion  ANDREA  Cardiorenal syndrome   Anemia    HPI:   68 year-old female presenting to [facility] ER on 24 with shortness of breath, difficulty ambulating, generalized weakness, and BLE edema. Work-up in ER: /63, HR 53, Temp 36.6, RR 20, SpO2 95%, EKG showed sinus bradycardia, CXR showed a new large right pleural effusion and basilar atelectasis, underlying pneumonia is not excluded, CT of chest showed large right pleural effusion with complete collapse of the RLL, atelectasis/consolidation at the RUL and RML, tree-in-bud airspace opacities at the RUL and LLL, and left thyroid gland nodule, UA + glucose and protein, Hgb 9.5, Hct 32.8, Glu 220, K+ 5.4, BUN 49, Cr 3.25, BNP 1,786. Case was discussed with cardiology and nephrology, who recommended IV diuresis. Pt. was started on IV diuretics and IV ATB and admitted to [facility] for further evaluation and treatment. Hospital course:    Acute hypoxic hypercapnic respiratory failure 2/2 CAP/COPD exacerbation/acute on chronic heart failure/large right pleural effusion-2 gm Na+ diet, I&O, daily weights, BiPAP --> supplemental O2, IV lasix, IV solu-medrol, IV Rocephin, IV Azithromycin, duonebs, cardiology consult, pulmonology consult, underwent thoracentesis on  (2300 ml of clear pleural fluid was obtained), TTE showed normal LVSF with 50-56% EF, impaired relaxation pattern of left ventricular diastolic filling, mildly reduced right ventricular systolic function, underwent RHC on  which showed mild pulmonary hypertension, diuretic changed to PO upon discharge, patient to F/U with cardiology after discharge  ANDREA-nephrology consult, renal US negative for left hydronephrosis, cardiorenal syndrome suspected 2/2 acute decompensated CHF, F/U with  nephrologist after discharge (Dr. Mitchell)  Generalized weakness-PT/OT evaluations, recommending SNF   Left knee pain-XR showed small joint effusion and degenerative changes, reticular increased density in the subcutaneous tissues which may represent lymphedema and/or cellulitis, Tylenol prn for pain   AFib/CAD/HLD-s/p Watchman in August 2023, c/w atorvastatin, amiodarone, metoprolol, ASA, plavix, monitored on telemetry   DM2-accuchecks, c/w lantus, ISS, jardiance held   GERD-PPI  Depression-c/w venlafaxine   Constipation-laxatives and stool softeners added    Pt. was HDS and discharged to [facility] on 2/15/24. On 2/18/24, patient was sent to the ER for lethargy and hypoxia. Work-up in ER: D-Dimer 13,011, BUN 58, Cr 2.88, WBC 6.7, Hgb 8.0, Trop 43, BNP 1,444, CXR showed stable pleural and parenchymal opacities in the mid and lower right hemithorax and a mild new left pleural effusion with left basilar atelectasis or edema, SpO2 82% on RA. Pt. was given Lasix 80 mg. US of BLE was negative for DVT. Pt. was placed on BiPAP and transferred to [facility] for further evaluation and treatment. Hospital course:     Acute on chronic hypoxic/hypercapnic respiratory failure 2/2 Influenza B, COPD exacerbation, and decompensated heart failure-BiPAP --> high-flow O2 --> supplemental O2, tamiflu, prednisone, duonebs, diuresis --> dialysis, pulmonology and cardiology consulted   Acute on chronic diastolic CHF-strict I&O, daily weight, 2 gm Na+ diet, c/w metoprolol, cardiology consult   Elevated D-Dimer-US negative for DVT, VQ scan showed low probability of PE  BLE edema/redness-possibly underlying cellulitis, leg elevation, local wound care, IV vancomycin  Acute on chronic anemia-required blood transfusion, iron started, CBC monitored   ANDREA on CKD-nephrology consulted, TDC placed to R chest on 3/1 by general surgery, dialysis started    Pt. was HDS and discharged back to [facility] on 3/5/24. On 3/7, patient was reported  to have increased BLE edema with fluid-filled blisters and c/o pain. Venous US was ordered, which was negative for DVT. On 3/9, patient was at dialysis and was observed to be confused and c/o dizziness. SpO2 was 66% and she was placed on 5 L of O2. Pt. refused to go to the ER, but eventually agreed to evaluation. Work-up in ER: CXR showed a right basilar opacity, Flu negative, COVID negative, RSV negative. Pt. was discharged back to SNF on Levaquin for pneumonia.     On 3/13, routine blood work revealed Hgb of 6.4. Pt. to be set-up for outpatient blood transfusion at [facility]. On 3/14, staff reported patient with low BP (77/58) and lethargy. She was sent to [facility] ER for evaluation. In the ER, /114, HR 65, RR 28, SpO2 95%. Labs were drawn, but the ER discharged patient to dialysis prior to the labs resulting. Transport reported that patient was very lethargic and drowsy on the ride to dialysis to the point where he almost took the patient back to the ER. Upon review of the ER labs, Glu was 24 and Hgb was 6.9. When patient arrived to dialysis, blood sugar was checked and was 47. She was given 1 tube of glucose and a snack with improvement noted in blood sugar to 147 as well as resolution of lethargy.     Patient is s/p blood transfusion on 3/15. Today, she denies dizziness, HA, vision changes, SOB, cough, or chest pain. Blood sugars reviewed in chart. Staff report no other clinical concerns at this time.     ROS:    As above in HPI. Otherwise, all other systems have been reviewed and are negative for complaint.    Medications reviewed and verified in NH chart.     Patient Active Problem List   Diagnosis   • Hypothyroidism   • Acute on chronic diastolic heart failure (Multi)   • Altered mental status   • Anxiety   • Chronic fatigue   • Constipation by delayed colonic transit   • Chronic obstructive pulmonary disease (Multi)   • Type 2 diabetes mellitus (Multi)   • Diabetic renal disease (Multi)   •  Gastroesophageal reflux disease   • History of renal cell carcinoma   • Hypertension   • Peripheral vascular disease (CMS-HCC)   • Leukocytosis   • Lung nodule   • Major depressive disorder, single episode, unspecified   • Osteoarthritis   • Paroxysmal atrial fibrillation (Multi)   • Primary insomnia   • Stage 3b chronic kidney disease (CKD) (Multi)   • Trouble walking   • Hyperlipidemia, unspecified   • Anemia due to chronic kidney disease, on chronic dialysis (Multi)   • ANDREA (acute kidney injury) (CMS-HCC)   • ASHD (arteriosclerotic heart disease)   • At risk for falling   • Essential tremor   • Vitamin D deficiency   • Acute on chronic respiratory failure with hypoxia (Multi)   • Pulmonary hypertension (Multi)   • Pulmonary edema ([facility])   • Influenza B   • History of right nephrectomy   • Cognitive impairment        Past Medical History:   Diagnosis Date   • Anal fissure 10/12/2023   • Anemia    • ASHD (arteriosclerotic heart disease)    • At risk for falls    • Atrial fibrillation (Multi)    • CHF (congestive heart failure) (Multi)    • CKD (chronic kidney disease)    • COPD (chronic obstructive pulmonary disease) (Multi)    • CVA (cerebral vascular accident) (Multi)     2021- right-sided weakness   • Depression    • Diabetes mellitus (Multi)    • GERD (gastroesophageal reflux disease)    • H/O pleural effusion    • Hyperlipidemia    • Hypertension    • Hypothyroidism    • Hypoxia    • Impacted cerumen, left ear     Impacted cerumen of left ear   • Noncompliance    • Osteoarthritis    • Perianal dermatitis 10/12/2023   • Personal history of other diseases of the respiratory system     History of acute bronchitis   • PVD (peripheral vascular disease) (CMS-HCC)    • Renal carcinoma, right (Multi)     s/p partial nephrectomy 8/2021   • Respiratory failure (Multi)    • TIA (transient ischemic attack)    • Vasculitis (CMS-HCC) 10/12/2023   • Weakness        Past Surgical History:   Procedure Laterality Date   •  ANKLE SURGERY         • CARDIAC CATHETERIZATION N/A 2024    Procedure: Right Heart Cath;  Surgeon: Nicholas Antonio MD;  Location: [facility] Cardiac Cath Lab;  Service: Cardiovascular;  Laterality: N/A;   • CATARACT EXTRACTION Right     2019   •  SECTION, CLASSIC     • MR CHEST ANGIO W AND WO IV CONTRAST  2023    MR CHEST ANGIO W AND WO IV CONTRAST 2023 [facility] MRI   • MR HEAD ANGIO WO IV CONTRAST  2021    MR HEAD ANGIO WO IV CONTRAST [facility]   • NEPHRECTOMY  2021   • SHOULDER SURGERY      2009       Family History   Problem Relation Name Age of Onset   • Hypertension Mother     • Diabetes Father     • Heart disease Father     • Cancer Sister     • Cancer Brother     • Diabetes Daughter     • Asthma Daughter     • Heart attack Daughter     • Diabetes Maternal Grandfather     • Heart disease Paternal Grandmother     • Diabetes Other     • Hypertension Other     • COPD Other     • Other (chronic lung disease) Other         Social History     Tobacco Use   Smoking Status Former   • Current packs/day: 0.50   • Average packs/day: 0.5 packs/day for 55.3 years (27.7 ttl pk-yrs)   • Types: Cigarettes   • Start date: 1969   • Passive exposure: Never   Smokeless Tobacco Never       Social History     Substance and Sexual Activity   Alcohol Use Not Currently       Social History     Substance and Sexual Activity   Drug Use Yes   • Types: Marijuana       Allergies   Allergen Reactions   • Citalopram Other, Rash and Hives     Facial breakout, blisters, and rash   • Adhesive Tape-Silicones Other     blisters   • Amoxicillin Swelling   • Bee Venom Protein (Honey Bee) Swelling   • Codeine Headache and Other     Migraines   • Latex Other     blisters   • Lisinopril Swelling     Facial swelling   • Prednisone Other     Yeast infection   • Tuberculin Ppd Unknown   • Doxycycline Rash   • Oseltamivir Rash   • Phenyleph-Shark Oil-Glyc-Pet Rash   • Phenylephrine Nausea/vomiting         Vital Signs:   112/55-67-18-97.3-95% on RA     Physical Exam:  General: Sitting up in WC in NAD, alert   Head/Face: NCAT, symmetrical  Eyes: PERRLA, no injection, no discharge  ENT: Hearing not impaired, ears without scars or lesions, nasal mucosa and turbinates pink, septum midline, lips pink and moist  Neck: Supple, symmetrical  Respiratory: CTA but diminished without adventitious sounds, respirations even and nonlabored without use of accessory muscles, good air exchange  Cardio: RRR without murmur or gallops, normal S1S2, + anasarca (improved), BLE NP edema (improved), pedal pulses 2+/4 bilaterally  Chest/Breast: Symmetrical, tunneled dialysis cath to R chest   GI: BS x 4, normoactive, non-distended, abd round and soft, no masses or tenderness  : No suprapubic tenderness or distention  MSK: Gait not assessed, joints with full ROM without pain or contractures  Skin: Skin warm and dry, no induration, tubi- intact to BLE  Neurologic: Cranial nerves II through XII intact, superficial touch and pain sensation intact  Psychiatric: Alert to person, place, and time, calm and cooperative     Results/Data:   3/27/24: Glu 108, BUN 23 Cr 1.59, GFR 34, CO2 34, Kel 7.9, Hgb 7.8, Hct 26.3  3/18/24: BUN 38, Cr 2.41, GFR 21, Na+ 136, K+ 3.5, Kel 7.4, Hgb 7.3, Hct 23.8, Plt 175  3/14/24: Glu 24, Hgb 6.9  3/9/24: Glu 95, K+ 3.4, CO2 33, BUN 7, Cr 0.84, GFR 75, Kel 7.6, Alb 2.6, Hgb 7.9, Hct 27.0  3/6/24: HgbA1C 7.7, Chol 147, , HDL 65, LDL 51    Assessment/Plan:  Acute on chronic respiratory failure with hypoxia and hypercapnia 2/2 pneumonia/COPD exacerbation/acute on chronic HFpEF/right pleural effusion-c/w supplemental O2 as needed, 2 gm Na+ diet, monitor weights, 1500 ml fluid restriction, s/p thoracentesis (2/2/24) with 2.3 L of fluid removed, c/w dialysis as scheduled, s/p Levaquin (end date 3/18), mikaela prn, monitor respiratory status closely  Physical deconditioning/generalized weakness-c/w PT/OT,  safety and fall precautions   ANDREA on CKD-in setting of acute decompensated heart failure, avoid nephrotoxic drugs, c/w dialysis, monitor R chest dialysis catheter site, monitor renal function, F/U with nephrologist as scheduled  AFib/CAD/HTN/HLD-s/p Watchman procedure (Aug. 2023), s/p RHC (2/27/24) which showed mild pulmonary hypertension, c/w amiodarone, aspirin, atorvastatin, metoprolol, and plavix, monitor BP and HR, F/U with cardiologist as scheduled  Hypokalemia-monitor, replete as needed  Anemia-s/p 1 unit of PRBCs on 3/15, c/w iron supplement, monitor CBC, transfuse for Hgb less than 7.0  GERD-c/w PPI  Depression-c/w venlafaxine (increased to 150 mg daily on 3/11 per psych), supportive care, Psych following  DM2-LCS diet, accuchecks, c/w humalog ISS with meals, c/w basaglar 10 units daily, HS snack, BP and lipid control, yearly eye exam, diabetic foot care, monitor HgbA1C  BLE edema/pain-elevate as tolerated, tubi- to BLE, local wound care, c/w Tylenol ES 1000 mg BID, consider gabapentin for neuropathy, monitor closely  Influenza B-RESOLVED, positive on 2/19/24, treated with Tamiflu  Hypoalbuminemia-increase protein intake, monitor albumin level, RD consult  Hypothyroidism/left thyroid nodule-c/w levothyroxine, monitor TSH and FT4, F/U with PCP after discharge for thyroid ultrasound    Orders:  NNO    Code Status:   Full Code      Electronically Signed By: GREGORIA Olivares   4/23/24  4:38 PM

## 2024-04-02 NOTE — PROGRESS NOTES
PLACE OF SERVICE:  [facility]    This is a subsequent visit.    Subjective   Patient ID: Daphne Morris is a 68 y.o. female who presents for Follow-up.    Ms. Cecelia Morris is a 68-year-old female with history of congestive heart failure.  She has had a prior stroke affecting her right side.  She is unable to care for herself and requires supportive care.    Review of Systems   Constitutional:  Negative for chills and fever.   Cardiovascular:  Negative for chest pain.   All other systems reviewed and are negative.    Objective   /78   Pulse 80   Temp 36.7 °C (98.1 °F)   Resp 18     Physical Exam  Vitals reviewed.   Constitutional:       General: She is not in acute distress.     Comments: This is a well-developed, well-nourished female, sitting in a chair   HENT:      Right Ear: Tympanic membrane, ear canal and external ear normal.      Left Ear: Tympanic membrane, ear canal and external ear normal.   Eyes:      General: No scleral icterus.     Pupils: Pupils are equal, round, and reactive to light.   Neck:      Vascular: No carotid bruit.   Cardiovascular:      Heart sounds: Normal heart sounds, S1 normal and S2 normal. No murmur heard.     No friction rub.   Pulmonary:      Breath sounds: Decreased breath sounds (throughout) present.   Abdominal:      Palpations: There is no hepatomegaly, splenomegaly or mass.   Musculoskeletal:         General: No swelling or deformity. Normal range of motion.      Cervical back: Neck supple.      Right lower leg: Edema present.      Left lower leg: Edema present.   Lymphadenopathy:      Cervical: No cervical adenopathy.      Upper Body:      Right upper body: No axillary adenopathy.      Left upper body: No axillary adenopathy.      Lower Body: No right inguinal adenopathy. No left inguinal adenopathy.   Neurological:      Mental Status: She is oriented to person, place, and time.      Cranial Nerves: Cranial nerves 2-12 are  intact. No cranial nerve deficit.      Sensory: No sensory deficit.      Motor: Weakness (right-sided) present.      Gait: Gait is intact.      Deep Tendon Reflexes: Reflexes normal.   Psychiatric:         Mood and Affect: Mood normal. Mood is not anxious or depressed. Affect is not angry.         Behavior: Behavior is not agitated.         Thought Content: Thought content normal.         Judgment: Judgment normal.     LAB WORK: Laboratory studies were reviewed.    Assessment/Plan   Problem List Items Addressed This Visit             ICD-10-CM       Advance Directives and General Issues    At risk for falling Z91.81       Cardiac and Vasculature    Hypertension I10    Hyperlipidemia, unspecified E78.5       Endocrine/Metabolic    Hypothyroidism E03.9    Type 2 diabetes mellitus (CMS/HCC) E11.9       Musculoskeletal and Injuries    Osteoarthritis M19.90     Other Visit Diagnoses         Codes    Congestive heart failure, unspecified HF chronicity, unspecified heart failure type (CMS/HCC)    -  Primary I50.9    COPD on long-term inhaled steroid therapy (CMS/HCC)     J44.9, Z79.51    Cerebrovascular accident (CVA), unspecified mechanism (CMS/HCC)     I63.9    Hypoxia     R09.02    Weakness     R53.1    Depression, unspecified depression type     F32.A    Anemia, unspecified type     D64.9    Atrial fibrillation, unspecified type (CMS/HCC)     I48.91        1. Heart failure, on diuretic.  2. COPD, on bronchodilator therapy.  3. CVA, on supportive care.  4. Hypoxia, on oxygen.  5. Weakness, on PT/OT.  6. Fall risk, on fall precautions.  7. Hypothyroidism, on levothyroxine.  8. Depression, on medication.  9. Hyperlipidemia, on statin.  10. Anemia.  Follow CBC.  11. Hypertension, medically controlled.  12. Atrial fibrillation, on rate control.  13. Diabetes, on insulin.  14. Osteoarthritis, on Tylenol.    Scribe Attestation  By signing my name below, IAnn, Scrkayla attest that this documentation has been prepared  under the direction and in the presence of Cale Lowe MD.       All medical record entries made by the scribe were personally dictated by me I have reviewed the chart and agree the record accurately reflects my personal performance of his history physical examination and management

## 2024-04-07 ENCOUNTER — NURSING HOME VISIT (OUTPATIENT)
Dept: POST ACUTE CARE | Facility: EXTERNAL LOCATION | Age: 69
End: 2024-04-07
Payer: MEDICARE

## 2024-04-07 DIAGNOSIS — D64.9 ANEMIA, UNSPECIFIED TYPE: ICD-10-CM

## 2024-04-07 DIAGNOSIS — F32.A DEPRESSION, UNSPECIFIED DEPRESSION TYPE: ICD-10-CM

## 2024-04-07 DIAGNOSIS — J96.90 RESPIRATORY FAILURE, UNSPECIFIED CHRONICITY, UNSPECIFIED WHETHER WITH HYPOXIA OR HYPERCAPNIA (MULTI): Primary | ICD-10-CM

## 2024-04-07 DIAGNOSIS — Z79.4 TYPE 2 DIABETES MELLITUS WITHOUT COMPLICATION, WITH LONG-TERM CURRENT USE OF INSULIN (MULTI): ICD-10-CM

## 2024-04-07 DIAGNOSIS — I10 HYPERTENSION, UNSPECIFIED TYPE: ICD-10-CM

## 2024-04-07 DIAGNOSIS — I50.9 CONGESTIVE HEART FAILURE, UNSPECIFIED HF CHRONICITY, UNSPECIFIED HEART FAILURE TYPE (MULTI): ICD-10-CM

## 2024-04-07 DIAGNOSIS — E11.9 TYPE 2 DIABETES MELLITUS WITHOUT COMPLICATION, WITH LONG-TERM CURRENT USE OF INSULIN (MULTI): ICD-10-CM

## 2024-04-07 DIAGNOSIS — E03.9 HYPOTHYROIDISM, UNSPECIFIED TYPE: ICD-10-CM

## 2024-04-07 DIAGNOSIS — Z91.81 AT RISK FOR FALLING: ICD-10-CM

## 2024-04-07 DIAGNOSIS — I48.91 ATRIAL FIBRILLATION, UNSPECIFIED TYPE (MULTI): ICD-10-CM

## 2024-04-07 DIAGNOSIS — I63.9 CEREBROVASCULAR ACCIDENT (CVA), UNSPECIFIED MECHANISM (MULTI): ICD-10-CM

## 2024-04-07 DIAGNOSIS — K21.9 GASTROESOPHAGEAL REFLUX DISEASE WITHOUT ESOPHAGITIS: ICD-10-CM

## 2024-04-07 DIAGNOSIS — E78.5 HYPERLIPIDEMIA, UNSPECIFIED HYPERLIPIDEMIA TYPE: ICD-10-CM

## 2024-04-07 DIAGNOSIS — J44.9 COPD ON LONG-TERM INHALED STEROID THERAPY (MULTI): ICD-10-CM

## 2024-04-07 DIAGNOSIS — R53.1 WEAKNESS: ICD-10-CM

## 2024-04-07 DIAGNOSIS — Z79.51 COPD ON LONG-TERM INHALED STEROID THERAPY (MULTI): ICD-10-CM

## 2024-04-07 PROCEDURE — 99309 SBSQ NF CARE MODERATE MDM 30: CPT | Performed by: INTERNAL MEDICINE

## 2024-04-07 NOTE — LETTER
Patient: Daphne Morris  : 1955    Encounter Date: 2024    PLACE OF SERVICE:  [facility] [place]    This is a subsequent visit.    Subjective  Patient ID: Daphne Morris is a 68 y.o. female who presents for Follow-up.    Ms. Cecelia Morris is a 68-year-old female with history of respiratory failure.  She suffers from COPD and congestive heart failure.  She has had a prior CVA and is unable to care for herself.  She requires supportive care.    Review of Systems   Constitutional:  Negative for chills and fever.   Cardiovascular:  Negative for chest pain.   All other systems reviewed and are negative.    Objective  /78   Pulse 84   Temp 36.5 °C (97.7 °F)   Resp 18     Physical Exam  Vitals reviewed.   Constitutional:       General: She is not in acute distress.     Comments: This is a well-developed, well-nourished female, sitting in a chair   HENT:      Right Ear: Tympanic membrane, ear canal and external ear normal.      Left Ear: Tympanic membrane, ear canal and external ear normal.   Eyes:      General: No scleral icterus.     Pupils: Pupils are equal, round, and reactive to light.   Neck:      Vascular: No carotid bruit.   Cardiovascular:      Heart sounds: Normal heart sounds, S1 normal and S2 normal. No murmur heard.     No friction rub.   Pulmonary:      Breath sounds: Decreased breath sounds (throughout) present.   Abdominal:      Palpations: There is no hepatomegaly, splenomegaly or mass.   Musculoskeletal:         General: No swelling or deformity. Normal range of motion.      Cervical back: Neck supple.      Right lower leg: Edema present.      Left lower leg: Edema present.   Lymphadenopathy:      Cervical: No cervical adenopathy.      Upper Body:      Right upper body: No axillary adenopathy.      Left upper body: No axillary adenopathy.      Lower Body: No right inguinal adenopathy. No left inguinal adenopathy.   Neurological:      Mental Status:  She is oriented to person, place, and time.      Cranial Nerves: Cranial nerves 2-12 are intact. No cranial nerve deficit.      Sensory: No sensory deficit.      Motor: Weakness (right-sided) present.      Gait: Gait is intact.      Deep Tendon Reflexes: Reflexes normal.   Psychiatric:         Mood and Affect: Mood normal. Mood is not anxious or depressed. Affect is not angry.         Behavior: Behavior is not agitated.         Thought Content: Thought content normal.         Judgment: Judgment normal.     LAB WORK: Laboratory studies were reviewed.    Assessment/Plan  Problem List Items Addressed This Visit             ICD-10-CM       Advance Directives and General Issues    At risk for falling Z91.81       Cardiac and Vasculature    Hypertension I10    Hyperlipidemia, unspecified E78.5       Endocrine/Metabolic    Hypothyroidism E03.9    Type 2 diabetes mellitus (Multi) E11.9       Gastrointestinal and Abdominal    Gastroesophageal reflux disease K21.9     Other Visit Diagnoses         Codes    Respiratory failure, unspecified chronicity, unspecified whether with hypoxia or hypercapnia (Multi)    -  Primary J96.90    Congestive heart failure, unspecified HF chronicity, unspecified heart failure type (CMS/[facility])     I50.9    COPD on long-term inhaled steroid therapy (CMS/[facility])     J44.9, Z79.51    Cerebrovascular accident (CVA), unspecified mechanism (Multi)     I63.9    Atrial fibrillation, unspecified type (Multi)     I48.91    Weakness     R53.1    Anemia, unspecified type     D64.9    Depression, unspecified depression type     F32.A        1. Respiratory failure, on oxygen.  2. Heart failure, on diuretic.  3. COPD, on bronchodilator therapy.  4. CVA, on supportive care.  5. Hypertension, med controlled.  6. Diabetes, on insulin.  7. Atrial fibrillation, rate controlled.  8. Weakness, on PT/OT.  9. Fall risk, on fall precaution.  10.Anemia, follow CBC.  11. Hypothyroidism, on levothyroxine.  12. Depression, on  medication.  13. Hyperlipidemia, on statin.  14. Reflux disease on PPI.    Scribe Attestation  By signing my name below, I, Ann Santos attest that this documentation has been prepared under the direction and in the presence of Cale Lowe MD.     All medical record entries made by the scribe were personally dictated by me I have reviewed the chart and agree the record accurately reflects my personal performance of his history physical examination and management      Electronically Signed By: Cale Lowe MD   4/15/24 11:38 PM

## 2024-04-07 NOTE — PROGRESS NOTES
Chief Complaint:   SNF F/U  Acute on chronic diastolic heart failure  Physical deconditioning/weakness  Acute hypoxic respiratory failure   Pleural effusion  ANDREA  Cardiorenal syndrome   Anemia    HPI:   68 year-old female presenting to [facility] ER on 2/1/24 with shortness of breath, difficulty ambulating, generalized weakness, and BLE edema. Work-up in ER: /63, HR 53, Temp 36.6, RR 20, SpO2 95%, EKG showed sinus bradycardia, CXR showed a new large right pleural effusion and basilar atelectasis, underlying pneumonia is not excluded, CT of chest showed large right pleural effusion with complete collapse of the RLL, atelectasis/consolidation at the RUL and RML, tree-in-bud airspace opacities at the RUL and LLL, and left thyroid gland nodule, UA + glucose and protein, Hgb 9.5, Hct 32.8, Glu 220, K+ 5.4, BUN 49, Cr 3.25, BNP 1,786. Case was discussed with cardiology and nephrology, who recommended IV diuresis. Pt. was started on IV diuretics and IV ATB and admitted to [facility] for further evaluation and treatment. Hospital course:    Acute hypoxic hypercapnic respiratory failure 2/2 CAP/COPD exacerbation/acute on chronic heart failure/large right pleural effusion-2 gm Na+ diet, I&O, daily weights, BiPAP --> supplemental O2, IV lasix, IV solu-medrol, IV Rocephin, IV Azithromycin, duonebs, cardiology consult, pulmonology consult, underwent thoracentesis on 2/2 (2300 ml of clear pleural fluid was obtained), TTE showed normal LVSF with 50-56% EF, impaired relaxation pattern of left ventricular diastolic filling, mildly reduced right ventricular systolic function, underwent RHC on 2/7 which showed mild pulmonary hypertension, diuretic changed to PO upon discharge, patient to F/U with cardiology after discharge  ANDREA-nephrology consult, renal US negative for left hydronephrosis, cardiorenal syndrome suspected 2/2 acute decompensated CHF, F/U with nephrologist after discharge (Dr. Mitchell)  Generalized weakness-PT/OT  evaluations, recommending SNF   Left knee pain-XR showed small joint effusion and degenerative changes, reticular increased density in the subcutaneous tissues which may represent lymphedema and/or cellulitis, Tylenol prn for pain   AFib/CAD/HLD-s/p Watchman in August 2023, c/w atorvastatin, amiodarone, metoprolol, ASA, plavix, monitored on telemetry   DM2-accuchecks, c/w lantus, ISS, jardiance held   GERD-PPI  Depression-c/w venlafaxine   Constipation-laxatives and stool softeners added    Pt. was HDS and discharged to [facility] on 2/15/24. On 2/18/24, patient was sent to the ER for lethargy and hypoxia. Work-up in ER: D-Dimer 13,011, BUN 58, Cr 2.88, WBC 6.7, Hgb 8.0, Trop 43, BNP 1,444, CXR showed stable pleural and parenchymal opacities in the mid and lower right hemithorax and a mild new left pleural effusion with left basilar atelectasis or edema, SpO2 82% on RA. Pt. was given Lasix 80 mg. US of BLE was negative for DVT. Pt. was placed on BiPAP and transferred to [facility] for further evaluation and treatment. Hospital course:     Acute on chronic hypoxic/hypercapnic respiratory failure 2/2 Influenza B, COPD exacerbation, and decompensated heart failure-BiPAP --> high-flow O2 --> supplemental O2, tamiflu, prednisone, duonebs, diuresis --> dialysis, pulmonology and cardiology consulted   Acute on chronic diastolic CHF-strict I&O, daily weight, 2 gm Na+ diet, c/w metoprolol, cardiology consult   Elevated D-Dimer-US negative for DVT, VQ scan showed low probability of PE  BLE edema/redness-possibly underlying cellulitis, leg elevation, local wound care, IV vancomycin  Acute on chronic anemia-required blood transfusion, iron started, CBC monitored   ANDREA on CKD-nephrology consulted, TDC placed to R chest on 3/1 by general surgery, dialysis started    Pt. was HDS and discharged back to [facility] on 3/5/24. On 3/7, patient was reported to have increased BLE edema with fluid-filled blisters and c/o pain.  "Venous US was ordered, which was negative for DVT. On 3/9, patient was at dialysis and was observed to be confused and c/o dizziness. SpO2 was 66% and she was placed on 5 L of O2. Pt. refused to go to the ER, but eventually agreed to evaluation. Work-up in ER: CXR showed a right basilar opacity, Flu negative, COVID negative, RSV negative. Pt. was discharged back to SNF on Levaquin for pneumonia.     On 3/13, routine blood work revealed Hgb of 6.4. Pt. to be set-up for outpatient blood transfusion at [facility]. On 3/14, staff reported patient with low BP (77/58) and lethargy. She was sent to [facility] ER for evaluation. In the ER, /114, HR 65, RR 28, SpO2 95%. Labs were drawn, but the ER discharged patient to dialysis prior to the labs resulting. Transport reported that patient was very lethargic and drowsy on the ride to dialysis to the point where he almost took the patient back to the ER. Upon review of the ER labs, Glu was 24 and Hgb was 6.9. When patient arrived to dialysis, blood sugar was checked and was 47. She was given 1 tube of glucose and a snack with improvement noted in blood sugar to 147 as well as resolution of lethargy.     Today, patient is s/p blood transfusion on 3/15. She denies dizziness, HA, vision changes, SOB, cough, or chest pain. She reports improvement in generalized edema and reports less \"seeping\" of clear fluid from her arms and legs. She continues to report BLE pain, that is described as \"burning\". She reports improvement in appetite. Blood sugars reviewed in chart. Staff report no other clinical concerns at this time.     ROS:    As above in HPI. Otherwise, all other systems have been reviewed and are negative for complaint.    Medications reviewed and verified in NH chart.     Patient Active Problem List   Diagnosis    Hypothyroidism    Acute on chronic diastolic heart failure (CMS/HCC)    Altered mental status    Anxiety    Chronic fatigue    Constipation by delayed colonic " transit    Chronic obstructive pulmonary disease (CMS/[facility])    Type 2 diabetes mellitus (CMS/[facility])    Diabetic renal disease (CMS/[facility])    Gastroesophageal reflux disease    History of renal cell carcinoma    Hypertension    Peripheral vascular disease (CMS/[facility])    Leukocytosis    Lung nodule    Major depressive disorder, single episode, unspecified    Osteoarthritis    Paroxysmal atrial fibrillation (CMS/[facility])    Primary insomnia    Stage 3b chronic kidney disease (CKD) (CMS/[facility])    Trouble walking    Hyperlipidemia, unspecified    Anemia due to chronic kidney disease, on chronic dialysis (CMS/[facility])    ANDREA (acute kidney injury) (CMS/[facility])    ASHD (arteriosclerotic heart disease)    At risk for falling    Essential tremor    Vitamin D deficiency    Acute on chronic respiratory failure with hypoxia (CMS/[facility])    Pulmonary hypertension (CMS/[facility])    Pulmonary edema    Influenza B    History of right nephrectomy    Cognitive impairment        Past Medical History:   Diagnosis Date    Anal fissure 10/12/2023    Anemia     At risk for falls     Atrial fibrillation (CMS/[facility])     CHF (congestive heart failure) (CMS/[facility])     COPD (chronic obstructive pulmonary disease) (CMS/[facility])     CVA (cerebral vascular accident) (CMS/[facility])     2021- right-sided weakness    Depression     Diabetes mellitus (CMS/[facility])     GERD (gastroesophageal reflux disease)     Hyperlipidemia     Hypertension     Hypothyroidism     Hypoxia     Impacted cerumen, left ear     Impacted cerumen of left ear    Noncompliance     Perianal dermatitis 10/12/2023    Personal history of other diseases of the respiratory system     History of acute bronchitis    PVD (peripheral vascular disease) (CMS/[facility])     Renal carcinoma, right (CMS/[facility])     s/p partial nephrectomy 8/2021    Respiratory failure (CMS/[facility])     TIA (transient ischemic attack)     Vasculitis (CMS/HCC) 10/12/2023    Weakness        Past Surgical History:   Procedure Laterality Date    ANKLE SURGERY      2013     CARDIAC CATHETERIZATION N/A 2024    Procedure: Right Heart Cath;  Surgeon: Nicholas Antonio MD;  Location: [facility] Cardiac Cath Lab;  Service: Cardiovascular;  Laterality: N/A;    CATARACT EXTRACTION Right     2019     SECTION, CLASSIC      MR CHEST ANGIO W AND WO IV CONTRAST  2023    MR CHEST ANGIO W AND WO IV CONTRAST 2023 [facility] MRI    MR HEAD ANGIO WO IV CONTRAST  2021    MR HEAD ANGIO WO IV CONTRAST [facility]    NEPHRECTOMY  2021    SHOULDER SURGERY      2009       Family History   Problem Relation Name Age of Onset    Hypertension Mother      Diabetes Father      Heart disease Father      Cancer Sister      Cancer Brother      Diabetes Daughter      Asthma Daughter      Heart attack Daughter      Diabetes Maternal Grandfather      Heart disease Paternal Grandmother      Diabetes Other      Hypertension Other      COPD Other         Social History     Tobacco Use   Smoking Status Former    Packs/day: 0.50    Years: 50.00    Additional pack years: 0.00    Total pack years: 25.00    Types: Cigarettes    Start date: 1969    Passive exposure: Never   Smokeless Tobacco Never       Social History     Substance and Sexual Activity   Alcohol Use Not Currently       Social History     Substance and Sexual Activity   Drug Use Yes    Types: Marijuana       Allergies   Allergen Reactions    Citalopram Other, Rash and Hives     Facial breakout, blisters, and rash    Adhesive Tape-Silicones Other     blisters    Amoxicillin Swelling    Bee Venom Protein (Honey Bee) Swelling    Codeine Headache and Other     Migraines    Latex Other     blisters    Lisinopril Swelling     Facial swelling    Prednisone Other     Yeast infection    Tuberculin Ppd Unknown    Doxycycline Rash    Oseltamivir Rash    Phenyleph-Shark Oil-Glyc-Pet Rash    Phenylephrine Nausea/vomiting        Vital Signs:   132/63-74-16-98.4-97% on RA     Physical Exam:  General: Sitting up in WC in NAD,  alert   Head/Face: NCAT, symmetrical  Eyes: PERRLA, no injection, no discharge  ENT: Hearing not impaired, ears without scars or lesions, nasal mucosa and turbinates pink, septum midline, lips pink and moist  Neck: Supple, symmetrical  Respiratory: CTA but diminished without adventitious sounds, respirations even and nonlabored without use of accessory muscles, good air exchange  Cardio: RRR without murmur or gallops, normal S1S2, + anasarca (improved), BLE NP edema (improved), pedal pulses 2+/4 bilaterally  Chest/Breast: Symmetrical, tunneled dialysis cath to R chest   GI: BS x 4, normoactive, non-distended, abd round and soft, no masses or tenderness  : No suprapubic tenderness or distention  MSK: Gait not assessed, joints with full ROM without pain or contractures, + generalized weakness  Skin: Skin warm and dry, no induration, tubi- intact to BLE  Neurologic: Cranial nerves II through XII intact, superficial touch and pain sensation intact  Psychiatric: Alert to person, place, and time, calm and cooperative     Results/Data:   3/18/24: BUN 38, Cr 2.41, GFR 21, Na+ 136, K+ 3.5, Kel 7.4, Hgb 7.3, Hct 23.8, Plt 175  3/14/24: Glu 24, Hgb 6.9  3/9/24: Glu 95, K+ 3.4, CO2 33, BUN 7, Cr 0.84, GFR 75, Kel 7.6, Alb 2.6, Hgb 7.9, Hct 27.0  3/6/24: HgbA1C 7.7, Chol 147, , HDL 65, LDL 51    Assessment/Plan:  Acute on chronic respiratory failure with hypoxia and hypercapnia 2/2 pneumonia/COPD exacerbation/acute on chronic HFpEF/right pleural effusion-c/w supplemental O2 as needed, 2 gm Na+ diet, monitor weights, 1500 ml fluid restriction, s/p thoracentesis (2/2/24) with 2.3 L of fluid removed, c/w dialysis as scheduled, s/p Levaquin (end date 3/18), duonebs prn, monitor respiratory status closely  Physical deconditioning/generalized weakness-c/w PT/OT, safety and fall precautions   ANDREA on CKD-in setting of acute decompensated heart failure, avoid nephrotoxic drugs, c/w dialysis, monitor R chest dialysis catheter  site, monitor renal function, F/U with nephrologist as scheduled  AFib/CAD/HTN/HLD-s/p Watchman procedure (Aug. 2023), s/p RHC (2/27/24) which showed mild pulmonary hypertension, c/w amiodarone, aspirin, atorvastatin, metoprolol, and plavix, monitor BP and HR, F/U with cardiologist as scheduled  Hypokalemia-monitor, replete as needed  Anemia-s/p 1 unit of PRBCs on 3/15, c/w iron supplement, monitor CBC, transfuse for Hgb less than 7.0  GERD-c/w PPI  Depression-c/w venlafaxine (increased to 150 mg daily on 3/11 per psych), supportive care, Psych following  DM2-LCS diet, accuchecks, c/w humalog ISS with meals, decrease basaglar to 10 units daily, HS snack, BP and lipid control, yearly eye exam, diabetic foot care, monitor HgbA1C  BLE edema/pain-elevate as tolerated, tubi- to BLE, local wound care, c/w Tylenol ES 1000 mg BID, consider gabapentin for neuropathy, monitor closely  Influenza B-RESOLVED, positive on 2/19/24, treated with Tamiflu  Hypoalbuminemia-increase protein intake, monitor albumin level, RD consult  Hypothyroidism/left thyroid nodule-c/w levothyroxine, monitor TSH and FT4, F/U with PCP after discharge for thyroid ultrasound    Orders:  NNO    Code Status:   Full Code

## 2024-04-09 ENCOUNTER — NURSING HOME VISIT (OUTPATIENT)
Dept: POST ACUTE CARE | Facility: EXTERNAL LOCATION | Age: 69
End: 2024-04-09
Payer: MEDICARE

## 2024-04-09 DIAGNOSIS — Z79.4 TYPE 2 DIABETES MELLITUS WITHOUT COMPLICATION, WITH LONG-TERM CURRENT USE OF INSULIN (MULTI): ICD-10-CM

## 2024-04-09 DIAGNOSIS — I13.0 CARDIORENAL SYNDROME WITH RENAL FAILURE, STAGE 1-4 OR UNSPECIFIED CHRONIC KIDNEY DISEASE, WITH HEART FAILURE (MULTI): ICD-10-CM

## 2024-04-09 DIAGNOSIS — F32.A DEPRESSION, UNSPECIFIED DEPRESSION TYPE: ICD-10-CM

## 2024-04-09 DIAGNOSIS — I48.91 ATRIAL FIBRILLATION, UNSPECIFIED TYPE (MULTI): ICD-10-CM

## 2024-04-09 DIAGNOSIS — N18.9 CHRONIC KIDNEY DISEASE, UNSPECIFIED CKD STAGE: ICD-10-CM

## 2024-04-09 DIAGNOSIS — R53.81 PHYSICAL DECONDITIONING: ICD-10-CM

## 2024-04-09 DIAGNOSIS — I10 HYPERTENSION, UNSPECIFIED TYPE: ICD-10-CM

## 2024-04-09 DIAGNOSIS — E03.9 HYPOTHYROIDISM, UNSPECIFIED TYPE: ICD-10-CM

## 2024-04-09 DIAGNOSIS — J96.22 ACUTE ON CHRONIC RESPIRATORY FAILURE WITH HYPOXIA AND HYPERCAPNIA (MULTI): Primary | ICD-10-CM

## 2024-04-09 DIAGNOSIS — J96.21 ACUTE ON CHRONIC RESPIRATORY FAILURE WITH HYPOXIA AND HYPERCAPNIA (MULTI): Primary | ICD-10-CM

## 2024-04-09 DIAGNOSIS — R60.1 ANASARCA: ICD-10-CM

## 2024-04-09 DIAGNOSIS — K21.9 GASTROESOPHAGEAL REFLUX DISEASE WITHOUT ESOPHAGITIS: ICD-10-CM

## 2024-04-09 DIAGNOSIS — E11.9 TYPE 2 DIABETES MELLITUS WITHOUT COMPLICATION, WITH LONG-TERM CURRENT USE OF INSULIN (MULTI): ICD-10-CM

## 2024-04-09 PROCEDURE — 99309 SBSQ NF CARE MODERATE MDM 30: CPT | Performed by: NURSE PRACTITIONER

## 2024-04-09 NOTE — LETTER
Patient: Daphne Morris  : 1955    Encounter Date: 2024    Chief Complaint:   SNF F/U  Acute on chronic diastolic heart failure  Physical deconditioning/weakness  Acute hypoxic respiratory failure   Pleural effusion  ANDREA  Cardiorenal syndrome   Anemia    HPI:   68 year-old female presenting to [facility] ER on 24 with shortness of breath, difficulty ambulating, generalized weakness, and BLE edema. Work-up in ER: /63, HR 53, Temp 36.6, RR 20, SpO2 95%, EKG showed sinus bradycardia, CXR showed a new large right pleural effusion and basilar atelectasis, underlying pneumonia is not excluded, CT of chest showed large right pleural effusion with complete collapse of the RLL, atelectasis/consolidation at the RUL and RML, tree-in-bud airspace opacities at the RUL and LLL, and left thyroid gland nodule, UA + glucose and protein, Hgb 9.5, Hct 32.8, Glu 220, K+ 5.4, BUN 49, Cr 3.25, BNP 1,786. Case was discussed with cardiology and nephrology, who recommended IV diuresis. Pt. was started on IV diuretics and IV ATB and admitted to [facility] for further evaluation and treatment. Hospital course:    Acute hypoxic hypercapnic respiratory failure 2/2 CAP/COPD exacerbation/acute on chronic heart failure/large right pleural effusion-2 gm Na+ diet, I&O, daily weights, BiPAP --> supplemental O2, IV lasix, IV solu-medrol, IV Rocephin, IV Azithromycin, duonebs, cardiology consult, pulmonology consult, underwent thoracentesis on  (2300 ml of clear pleural fluid was obtained), TTE showed normal LVSF with 50-56% EF, impaired relaxation pattern of left ventricular diastolic filling, mildly reduced right ventricular systolic function, underwent RHC on  which showed mild pulmonary hypertension, diuretic changed to PO upon discharge, patient to F/U with cardiology after discharge  ANDREA-nephrology consult, renal US negative for left hydronephrosis, cardiorenal syndrome suspected 2/2 acute decompensated CHF, F/U with  nephrologist after discharge (Dr. Mitchell)  Generalized weakness-PT/OT evaluations, recommending SNF   Left knee pain-XR showed small joint effusion and degenerative changes, reticular increased density in the subcutaneous tissues which may represent lymphedema and/or cellulitis, Tylenol prn for pain   AFib/CAD/HLD-s/p Watchman in August 2023, c/w atorvastatin, amiodarone, metoprolol, ASA, plavix, monitored on telemetry   DM2-accuchecks, c/w lantus, ISS, jardiance held   GERD-PPI  Depression-c/w venlafaxine   Constipation-laxatives and stool softeners added    Pt. was HDS and discharged to [facility] on 2/15/24. On 2/18/24, patient was sent to the ER for lethargy and hypoxia. Work-up in ER: D-Dimer 13,011, BUN 58, Cr 2.88, WBC 6.7, Hgb 8.0, Trop 43, BNP 1,444, CXR showed stable pleural and parenchymal opacities in the mid and lower right hemithorax and a mild new left pleural effusion with left basilar atelectasis or edema, SpO2 82% on RA. Pt. was given Lasix 80 mg. US of BLE was negative for DVT. Pt. was placed on BiPAP and transferred to [facility] for further evaluation and treatment. Hospital course:     Acute on chronic hypoxic/hypercapnic respiratory failure 2/2 Influenza B, COPD exacerbation, and decompensated heart failure-BiPAP --> high-flow O2 --> supplemental O2, tamiflu, prednisone, duonebs, diuresis --> dialysis, pulmonology and cardiology consulted   Acute on chronic diastolic CHF-strict I&O, daily weight, 2 gm Na+ diet, c/w metoprolol, cardiology consult   Elevated D-Dimer-US negative for DVT, VQ scan showed low probability of PE  BLE edema/redness-possibly underlying cellulitis, leg elevation, local wound care, IV vancomycin  Acute on chronic anemia-required blood transfusion, iron started, CBC monitored   ANDREA on CKD-nephrology consulted, TDC placed to R chest on 3/1 by general surgery, dialysis started    Pt. was HDS and discharged back to [facility] on 3/5/24. On 3/7, patient was reported  to have increased BLE edema with fluid-filled blisters and c/o pain. Venous US was ordered, which was negative for DVT. On 3/9, patient was at dialysis and was observed to be confused and c/o dizziness. SpO2 was 66% and she was placed on 5 L of O2. Pt. refused to go to the ER, but eventually agreed to evaluation. Work-up in ER: CXR showed a right basilar opacity, Flu negative, COVID negative, RSV negative. Pt. was discharged back to SNF on Levaquin for pneumonia.     On 3/13, routine blood work revealed Hgb of 6.4. Pt. to be set-up for outpatient blood transfusion at [facility]. On 3/14, staff reported patient with low BP (77/58) and lethargy. She was sent to [facility] ER for evaluation. In the ER, /114, HR 65, RR 28, SpO2 95%. Labs were drawn, but the ER discharged patient to dialysis prior to the labs resulting. Transport reported that patient was very lethargic and drowsy on the ride to dialysis to the point where he almost took the patient back to the ER. Upon review of the ER labs, Glu was 24 and Hgb was 6.9. When patient arrived to dialysis, blood sugar was checked and was 47. She was given 1 tube of glucose and a snack with improvement noted in blood sugar to 147 as well as resolution of lethargy.     Patient is s/p blood transfusion on 3/15. Today, she denies dizziness, HA, vision changes, SOB, cough, or chest pain. Blood sugars reviewed in chart. Staff report no other clinical concerns at this time.     ROS:    As above in HPI. Otherwise, all other systems have been reviewed and are negative for complaint.    Medications reviewed and verified in NH chart.     Patient Active Problem List   Diagnosis   • Hypothyroidism   • Acute on chronic diastolic heart failure (Multi)   • Altered mental status   • Anxiety   • Chronic fatigue   • Constipation by delayed colonic transit   • Chronic obstructive pulmonary disease (Multi)   • Type 2 diabetes mellitus (Multi)   • Diabetic renal disease (Multi)   •  Gastroesophageal reflux disease   • History of renal cell carcinoma   • Hypertension   • Peripheral vascular disease (CMS-HCC)   • Leukocytosis   • Lung nodule   • Major depressive disorder, single episode, unspecified   • Osteoarthritis   • Paroxysmal atrial fibrillation (Multi)   • Primary insomnia   • Stage 3b chronic kidney disease (CKD) (Multi)   • Trouble walking   • Hyperlipidemia, unspecified   • Anemia due to chronic kidney disease, on chronic dialysis (Multi)   • ANDREA (acute kidney injury) (CMS-HCC)   • ASHD (arteriosclerotic heart disease)   • At risk for falling   • Essential tremor   • Vitamin D deficiency   • Acute on chronic respiratory failure with hypoxia (Multi)   • Pulmonary hypertension (Multi)   • Pulmonary edema ([facility])   • Influenza B   • History of right nephrectomy   • Cognitive impairment        Past Medical History:   Diagnosis Date   • Anal fissure 10/12/2023   • Anemia    • ASHD (arteriosclerotic heart disease)    • At risk for falls    • Atrial fibrillation (Multi)    • CHF (congestive heart failure) (Multi)    • CKD (chronic kidney disease)    • COPD (chronic obstructive pulmonary disease) (Multi)    • CVA (cerebral vascular accident) (Multi)     2021- right-sided weakness   • Depression    • Diabetes mellitus (Multi)    • GERD (gastroesophageal reflux disease)    • H/O pleural effusion    • Hyperlipidemia    • Hypertension    • Hypothyroidism    • Hypoxia    • Impacted cerumen, left ear     Impacted cerumen of left ear   • Noncompliance    • Osteoarthritis    • Perianal dermatitis 10/12/2023   • Personal history of other diseases of the respiratory system     History of acute bronchitis   • PVD (peripheral vascular disease) (CMS-HCC)    • Renal carcinoma, right (Multi)     s/p partial nephrectomy 8/2021   • Respiratory failure (Multi)    • TIA (transient ischemic attack)    • Vasculitis (CMS-HCC) 10/12/2023   • Weakness        Past Surgical History:   Procedure Laterality Date   •  ANKLE SURGERY         • CARDIAC CATHETERIZATION N/A 2024    Procedure: Right Heart Cath;  Surgeon: Nicholas Antonio MD;  Location: [facility] Cardiac Cath Lab;  Service: Cardiovascular;  Laterality: N/A;   • CATARACT EXTRACTION Right     2019   •  SECTION, CLASSIC     • MR CHEST ANGIO W AND WO IV CONTRAST  2023    MR CHEST ANGIO W AND WO IV CONTRAST 2023 [facility] MRI   • MR HEAD ANGIO WO IV CONTRAST  2021    MR HEAD ANGIO WO IV CONTRAST [facility]   • NEPHRECTOMY  2021   • SHOULDER SURGERY      2009       Family History   Problem Relation Name Age of Onset   • Hypertension Mother     • Diabetes Father     • Heart disease Father     • Cancer Sister     • Cancer Brother     • Diabetes Daughter     • Asthma Daughter     • Heart attack Daughter     • Diabetes Maternal Grandfather     • Heart disease Paternal Grandmother     • Diabetes Other     • Hypertension Other     • COPD Other     • Other (chronic lung disease) Other         Social History     Tobacco Use   Smoking Status Former   • Current packs/day: 0.50   • Average packs/day: 0.5 packs/day for 55.3 years (27.7 ttl pk-yrs)   • Types: Cigarettes   • Start date: 1969   • Passive exposure: Never   Smokeless Tobacco Never       Social History     Substance and Sexual Activity   Alcohol Use Not Currently       Social History     Substance and Sexual Activity   Drug Use Yes   • Types: Marijuana       Allergies   Allergen Reactions   • Citalopram Other, Rash and Hives     Facial breakout, blisters, and rash   • Adhesive Tape-Silicones Other     blisters   • Amoxicillin Swelling   • Bee Venom Protein (Honey Bee) Swelling   • Codeine Headache and Other     Migraines   • Latex Other     blisters   • Lisinopril Swelling     Facial swelling   • Prednisone Other     Yeast infection   • Tuberculin Ppd Unknown   • Doxycycline Rash   • Oseltamivir Rash   • Phenyleph-Shark Oil-Glyc-Pet Rash   • Phenylephrine Nausea/vomiting         Vital Signs:   128/80-70-18-97.2-95% on RA     Physical Exam:  General: Sitting up in WC in NAD, alert   Head/Face: NCAT, symmetrical  Eyes: PERRLA, no injection, no discharge  ENT: Hearing not impaired, ears without scars or lesions, nasal mucosa and turbinates pink, septum midline, lips pink and moist  Neck: Supple, symmetrical  Respiratory: CTA but diminished without adventitious sounds, respirations even and nonlabored without use of accessory muscles, good air exchange  Cardio: RRR without murmur or gallops, normal S1S2, + anasarca (improved), BLE NP edema (improved), pedal pulses 2+/4 bilaterally  Chest/Breast: Symmetrical, tunneled dialysis cath to R chest   GI: BS x 4, normoactive, non-distended, abd round and soft, no masses or tenderness  : No suprapubic tenderness or distention  MSK: Gait not assessed, joints with full ROM without pain or contractures  Skin: Skin warm and dry, no induration, tubi- intact to BLE  Neurologic: Cranial nerves II through XII intact, superficial touch and pain sensation intact  Psychiatric: Alert to person, place, and time, calm and cooperative     Results/Data:   3/27/24: Glu 108, BUN 23 Cr 1.59, GFR 34, CO2 34, Kel 7.9, Hgb 7.8, Hct 26.3  3/18/24: BUN 38, Cr 2.41, GFR 21, Na+ 136, K+ 3.5, Kel 7.4, Hgb 7.3, Hct 23.8, Plt 175  3/14/24: Glu 24, Hgb 6.9  3/9/24: Glu 95, K+ 3.4, CO2 33, BUN 7, Cr 0.84, GFR 75, Kel 7.6, Alb 2.6, Hgb 7.9, Hct 27.0  3/6/24: HgbA1C 7.7, Chol 147, , HDL 65, LDL 51    Assessment/Plan:  Acute on chronic respiratory failure with hypoxia and hypercapnia 2/2 pneumonia/COPD exacerbation/acute on chronic HFpEF/right pleural effusion-c/w supplemental O2 as needed, 2 gm Na+ diet, monitor weights, 1500 ml fluid restriction, s/p thoracentesis (2/2/24) with 2.3 L of fluid removed, c/w dialysis as scheduled, s/p Levaquin (end date 3/18), mikaela prn, monitor respiratory status closely  Physical deconditioning/generalized weakness-c/w PT/OT, LCD  4/10 --> appealing for more therapy, safety and fall precautions   ANDREA on CKD-in setting of acute decompensated heart failure, avoid nephrotoxic drugs, c/w dialysis, monitor R chest dialysis catheter site, monitor renal function, F/U with nephrologist as scheduled  AFib/CAD/HTN/HLD-s/p Watchman procedure (Aug. 2023), s/p RHC (2/27/24) which showed mild pulmonary hypertension, c/w amiodarone, aspirin, atorvastatin, metoprolol, and plavix, monitor BP and HR, F/U with cardiologist as scheduled  Hypokalemia-monitor, replete as needed  Anemia-s/p 1 unit of PRBCs on 3/15, c/w iron supplement, monitor CBC, transfuse for Hgb less than 7.0  GERD-c/w PPI  Depression-c/w venlafaxine (increased to 150 mg daily on 3/11 per psych), supportive care, Psych following  DM2-LCS diet, accuchecks, c/w humalog ISS with meals, c/w basaglar 10 units daily, HS snack, BP and lipid control, yearly eye exam, diabetic foot care, monitor HgbA1C  BLE edema/pain-elevate as tolerated, tubi- to BLE, local wound care, c/w Tylenol ES 1000 mg BID, consider gabapentin for neuropathy, monitor closely  Influenza B-RESOLVED, positive on 2/19/24, treated with Tamiflu  Hypoalbuminemia-increase protein intake, monitor albumin level, RD consult  Hypothyroidism/left thyroid nodule-c/w levothyroxine, monitor TSH and FT4, F/U with PCP after discharge for thyroid ultrasound    Orders:  NNO    Code Status:   Full Code      Electronically Signed By: CARLENE Olivares-CNP   4/23/24  4:41 PM

## 2024-04-12 VITALS
HEART RATE: 84 BPM | SYSTOLIC BLOOD PRESSURE: 132 MMHG | TEMPERATURE: 97.7 F | DIASTOLIC BLOOD PRESSURE: 78 MMHG | RESPIRATION RATE: 18 BRPM

## 2024-04-12 ASSESSMENT — ENCOUNTER SYMPTOMS
FEVER: 0
CHILLS: 0

## 2024-04-12 NOTE — PROGRESS NOTES
PLACE OF SERVICE:  [facility] [place]    This is a subsequent visit.    Subjective   Patient ID: Daphne Morris is a 68 y.o. female who presents for Follow-up.    Ms. Cecelia Morris is a 68-year-old female with history of respiratory failure.  She suffers from COPD and congestive heart failure.  She has had a prior CVA and is unable to care for herself.  She requires supportive care.    Review of Systems   Constitutional:  Negative for chills and fever.   Cardiovascular:  Negative for chest pain.   All other systems reviewed and are negative.    Objective   /78   Pulse 84   Temp 36.5 °C (97.7 °F)   Resp 18     Physical Exam  Vitals reviewed.   Constitutional:       General: She is not in acute distress.     Comments: This is a well-developed, well-nourished female, sitting in a chair   HENT:      Right Ear: Tympanic membrane, ear canal and external ear normal.      Left Ear: Tympanic membrane, ear canal and external ear normal.   Eyes:      General: No scleral icterus.     Pupils: Pupils are equal, round, and reactive to light.   Neck:      Vascular: No carotid bruit.   Cardiovascular:      Heart sounds: Normal heart sounds, S1 normal and S2 normal. No murmur heard.     No friction rub.   Pulmonary:      Breath sounds: Decreased breath sounds (throughout) present.   Abdominal:      Palpations: There is no hepatomegaly, splenomegaly or mass.   Musculoskeletal:         General: No swelling or deformity. Normal range of motion.      Cervical back: Neck supple.      Right lower leg: Edema present.      Left lower leg: Edema present.   Lymphadenopathy:      Cervical: No cervical adenopathy.      Upper Body:      Right upper body: No axillary adenopathy.      Left upper body: No axillary adenopathy.      Lower Body: No right inguinal adenopathy. No left inguinal adenopathy.   Neurological:      Mental Status: She is oriented to person, place, and time.      Cranial Nerves:  Cranial nerves 2-12 are intact. No cranial nerve deficit.      Sensory: No sensory deficit.      Motor: Weakness (right-sided) present.      Gait: Gait is intact.      Deep Tendon Reflexes: Reflexes normal.   Psychiatric:         Mood and Affect: Mood normal. Mood is not anxious or depressed. Affect is not angry.         Behavior: Behavior is not agitated.         Thought Content: Thought content normal.         Judgment: Judgment normal.     LAB WORK: Laboratory studies were reviewed.    Assessment/Plan   Problem List Items Addressed This Visit             ICD-10-CM       Advance Directives and General Issues    At risk for falling Z91.81       Cardiac and Vasculature    Hypertension I10    Hyperlipidemia, unspecified E78.5       Endocrine/Metabolic    Hypothyroidism E03.9    Type 2 diabetes mellitus (Multi) E11.9       Gastrointestinal and Abdominal    Gastroesophageal reflux disease K21.9     Other Visit Diagnoses         Codes    Respiratory failure, unspecified chronicity, unspecified whether with hypoxia or hypercapnia (Multi)    -  Primary J96.90    Congestive heart failure, unspecified HF chronicity, unspecified heart failure type (CMS/[facility])     I50.9    COPD on long-term inhaled steroid therapy (CMS/HCC)     J44.9, Z79.51    Cerebrovascular accident (CVA), unspecified mechanism (Multi)     I63.9    Atrial fibrillation, unspecified type (Multi)     I48.91    Weakness     R53.1    Anemia, unspecified type     D64.9    Depression, unspecified depression type     F32.A        1. Respiratory failure, on oxygen.  2. Heart failure, on diuretic.  3. COPD, on bronchodilator therapy.  4. CVA, on supportive care.  5. Hypertension, med controlled.  6. Diabetes, on insulin.  7. Atrial fibrillation, rate controlled.  8. Weakness, on PT/OT.  9. Fall risk, on fall precaution.  10.Anemia, follow CBC.  11. Hypothyroidism, on levothyroxine.  12. Depression, on medication.  13. Hyperlipidemia, on statin.  14. Reflux disease on  [place].    Luna Attestation  By signing my name below, I, Luna Santos attest that this documentation has been prepared under the direction and in the presence of Cale Lowe MD.     All medical record entries made by the luna were personally dictated by me I have reviewed the chart and agree the record accurately reflects my personal performance of his history physical examination and management

## 2024-04-14 ENCOUNTER — NURSING HOME VISIT (OUTPATIENT)
Dept: POST ACUTE CARE | Facility: EXTERNAL LOCATION | Age: 69
End: 2024-04-14
Payer: MEDICARE

## 2024-04-14 DIAGNOSIS — D64.9 ANEMIA, UNSPECIFIED TYPE: ICD-10-CM

## 2024-04-14 DIAGNOSIS — I48.91 ATRIAL FIBRILLATION, UNSPECIFIED TYPE (MULTI): ICD-10-CM

## 2024-04-14 DIAGNOSIS — I10 HYPERTENSION, UNSPECIFIED TYPE: ICD-10-CM

## 2024-04-14 DIAGNOSIS — F32.A DEPRESSION, UNSPECIFIED DEPRESSION TYPE: ICD-10-CM

## 2024-04-14 DIAGNOSIS — J44.9 COPD ON LONG-TERM INHALED STEROID THERAPY (MULTI): ICD-10-CM

## 2024-04-14 DIAGNOSIS — I63.9 CEREBROVASCULAR ACCIDENT (CVA), UNSPECIFIED MECHANISM (MULTI): ICD-10-CM

## 2024-04-14 DIAGNOSIS — Z79.4 TYPE 2 DIABETES MELLITUS WITHOUT COMPLICATION, WITH LONG-TERM CURRENT USE OF INSULIN (MULTI): ICD-10-CM

## 2024-04-14 DIAGNOSIS — I50.9 CONGESTIVE HEART FAILURE, UNSPECIFIED HF CHRONICITY, UNSPECIFIED HEART FAILURE TYPE (MULTI): Primary | ICD-10-CM

## 2024-04-14 DIAGNOSIS — E78.5 HYPERLIPIDEMIA, UNSPECIFIED HYPERLIPIDEMIA TYPE: ICD-10-CM

## 2024-04-14 DIAGNOSIS — K21.9 GASTROESOPHAGEAL REFLUX DISEASE WITHOUT ESOPHAGITIS: ICD-10-CM

## 2024-04-14 DIAGNOSIS — Z79.51 COPD ON LONG-TERM INHALED STEROID THERAPY (MULTI): ICD-10-CM

## 2024-04-14 DIAGNOSIS — E03.9 HYPOTHYROIDISM, UNSPECIFIED TYPE: ICD-10-CM

## 2024-04-14 DIAGNOSIS — E11.9 TYPE 2 DIABETES MELLITUS WITHOUT COMPLICATION, WITH LONG-TERM CURRENT USE OF INSULIN (MULTI): ICD-10-CM

## 2024-04-14 DIAGNOSIS — J96.90 RESPIRATORY FAILURE, UNSPECIFIED CHRONICITY, UNSPECIFIED WHETHER WITH HYPOXIA OR HYPERCAPNIA (MULTI): ICD-10-CM

## 2024-04-14 PROCEDURE — 99309 SBSQ NF CARE MODERATE MDM 30: CPT | Performed by: INTERNAL MEDICINE

## 2024-04-14 NOTE — LETTER
Patient: Daphne Morris  : 1955    Encounter Date: 2024    PLACE OF SERVICE:  [facility] [place]    This is a subsequent visit.    Subjective  Patient ID: Daphne Morris is a 68 y.o. female who presents for Follow-up.    Ms. Cecelia Morris is a 68-year-old female with history of congestive heart failure with COPD.  She has had a prior CVA affecting her right side.  She is unable to care for herself and requires supportive care.    Review of Systems   Constitutional:  Negative for chills and fever.   Cardiovascular:  Negative for chest pain.   All other systems reviewed and are negative.    Objective  /82   Pulse 80   Temp 36.7 °C (98.1 °F)   Resp 18     Physical Exam  Vitals reviewed.   Constitutional:       General: She is not in acute distress.     Comments: This is a well-developed, well-nourished female, sitting in a chair   HENT:      Right Ear: Tympanic membrane, ear canal and external ear normal.      Left Ear: Tympanic membrane, ear canal and external ear normal.   Eyes:      General: No scleral icterus.     Pupils: Pupils are equal, round, and reactive to light.   Neck:      Vascular: No carotid bruit.   Cardiovascular:      Heart sounds: Normal heart sounds, S1 normal and S2 normal. No murmur heard.     No friction rub.   Pulmonary:      Breath sounds: Decreased breath sounds (throughout) present.   Abdominal:      Palpations: There is no hepatomegaly, splenomegaly or mass.   Musculoskeletal:         General: No swelling or deformity. Normal range of motion.      Cervical back: Neck supple.      Right lower leg: Edema present.      Left lower leg: Edema present.   Lymphadenopathy:      Cervical: No cervical adenopathy.      Upper Body:      Right upper body: No axillary adenopathy.      Left upper body: No axillary adenopathy.      Lower Body: No right inguinal adenopathy. No left inguinal adenopathy.   Neurological:      Mental Status: She is  oriented to person, place, and time.      Cranial Nerves: Cranial nerves 2-12 are intact. No cranial nerve deficit.      Sensory: No sensory deficit.      Motor: Weakness (right-sided) present.      Gait: Gait is intact.      Deep Tendon Reflexes: Reflexes normal.   Psychiatric:         Mood and Affect: Mood normal. Mood is not anxious or depressed. Affect is not angry.         Behavior: Behavior is not agitated.         Thought Content: Thought content normal.         Judgment: Judgment normal.     LAB WORK:  Laboratory studies were reviewed.    Assessment/Plan  Problem List Items Addressed This Visit             ICD-10-CM       Cardiac and Vasculature    Hypertension I10    Hyperlipidemia, unspecified E78.5       Endocrine/Metabolic    Hypothyroidism E03.9    Type 2 diabetes mellitus (Multi) E11.9       Gastrointestinal and Abdominal    Gastroesophageal reflux disease K21.9     Other Visit Diagnoses         Codes    Congestive heart failure, unspecified HF chronicity, unspecified heart failure type (Multi)    -  Primary I50.9    COPD on long-term inhaled steroid therapy (Multi)     J44.9, Z79.51    Respiratory failure, unspecified chronicity, unspecified whether with hypoxia or hypercapnia (Multi)     J96.90    Cerebrovascular accident (CVA), unspecified mechanism (Multi)     I63.9    Atrial fibrillation, unspecified type (Multi)     I48.91    Anemia, unspecified type     D64.9    Depression, unspecified depression type     F32.A        1. Heart failure, on diuretic.  2. COPD, on bronchodilator therapy.  3. Respiratory failure, on oxygen as needed.  4. Diabetes, on insulin.  5. CVA, on supportive care.  6. Hypertension, medically controlled.  7. Atrial fibrillation, on rate control.  8. Anemia.  Follow CBC.  9. Depression, on medication.  10. Hyperlipidemia, on statin.  11. Hypothyroidism, on levothyroxine.  12. Reflux disease, on PPI.    Scribe Attestation  By signing my name below, Ann RODRIGUEZ, Scribe attest  that this documentation has been prepared under the direction and in the presence of Cale Lowe MD.     All medical record entries made by the scribe were personally dictated by me I have reviewed the chart and agree the record accurately reflects my personal performance of his history physical examination and management      Electronically Signed By: Cale Lowe MD   4/19/24 12:18 AM

## 2024-04-16 ENCOUNTER — NURSING HOME VISIT (OUTPATIENT)
Dept: POST ACUTE CARE | Facility: EXTERNAL LOCATION | Age: 69
End: 2024-04-16
Payer: MEDICARE

## 2024-04-16 VITALS
SYSTOLIC BLOOD PRESSURE: 130 MMHG | HEART RATE: 80 BPM | TEMPERATURE: 98.1 F | DIASTOLIC BLOOD PRESSURE: 82 MMHG | RESPIRATION RATE: 18 BRPM

## 2024-04-16 DIAGNOSIS — D63.1 ANEMIA DUE TO CHRONIC KIDNEY DISEASE, ON CHRONIC DIALYSIS (MULTI): ICD-10-CM

## 2024-04-16 DIAGNOSIS — Z79.4 TYPE 2 DIABETES MELLITUS WITHOUT COMPLICATION, WITH LONG-TERM CURRENT USE OF INSULIN (MULTI): ICD-10-CM

## 2024-04-16 DIAGNOSIS — Z99.2 ANEMIA DUE TO CHRONIC KIDNEY DISEASE, ON CHRONIC DIALYSIS (MULTI): ICD-10-CM

## 2024-04-16 DIAGNOSIS — E16.2 HYPOGLYCEMIA: ICD-10-CM

## 2024-04-16 DIAGNOSIS — J90 PLEURAL EFFUSION ON RIGHT: ICD-10-CM

## 2024-04-16 DIAGNOSIS — J18.9 COMMUNITY ACQUIRED PNEUMONIA, UNSPECIFIED LATERALITY: ICD-10-CM

## 2024-04-16 DIAGNOSIS — N18.6 ANEMIA DUE TO CHRONIC KIDNEY DISEASE, ON CHRONIC DIALYSIS (MULTI): ICD-10-CM

## 2024-04-16 DIAGNOSIS — I48.91 ATRIAL FIBRILLATION, UNSPECIFIED TYPE (MULTI): ICD-10-CM

## 2024-04-16 DIAGNOSIS — I10 HYPERTENSION, UNSPECIFIED TYPE: ICD-10-CM

## 2024-04-16 DIAGNOSIS — N18.6 ESRD (END STAGE RENAL DISEASE) (MULTI): ICD-10-CM

## 2024-04-16 DIAGNOSIS — F32.A DEPRESSION, UNSPECIFIED DEPRESSION TYPE: ICD-10-CM

## 2024-04-16 DIAGNOSIS — J96.21 ACUTE ON CHRONIC RESPIRATORY FAILURE WITH HYPOXIA (MULTI): Primary | ICD-10-CM

## 2024-04-16 DIAGNOSIS — K21.9 GASTROESOPHAGEAL REFLUX DISEASE WITHOUT ESOPHAGITIS: ICD-10-CM

## 2024-04-16 DIAGNOSIS — I13.0 CARDIORENAL SYNDROME WITH RENAL FAILURE, STAGE 1-4 OR UNSPECIFIED CHRONIC KIDNEY DISEASE, WITH HEART FAILURE (MULTI): ICD-10-CM

## 2024-04-16 DIAGNOSIS — E11.9 TYPE 2 DIABETES MELLITUS WITHOUT COMPLICATION, WITH LONG-TERM CURRENT USE OF INSULIN (MULTI): ICD-10-CM

## 2024-04-16 PROCEDURE — 99309 SBSQ NF CARE MODERATE MDM 30: CPT | Performed by: NURSE PRACTITIONER

## 2024-04-16 ASSESSMENT — ENCOUNTER SYMPTOMS
CHILLS: 0
FEVER: 0

## 2024-04-16 NOTE — LETTER
Patient: Daphne Morris  : 1955    Encounter Date: 2024    Chief Complaint:   SNF F/U  Acute on chronic diastolic heart failure  Physical deconditioning/weakness  Acute hypoxic respiratory failure   Pleural effusion  ANDREA  Cardiorenal syndrome   Anemia    HPI:   68 year-old female presenting to [facility] ER on 24 with shortness of breath, difficulty ambulating, generalized weakness, and BLE edema. Work-up in ER: /63, HR 53, Temp 36.6, RR 20, SpO2 95%, EKG showed sinus bradycardia, CXR showed a new large right pleural effusion and basilar atelectasis, underlying pneumonia is not excluded, CT of chest showed large right pleural effusion with complete collapse of the RLL, atelectasis/consolidation at the RUL and RML, tree-in-bud airspace opacities at the RUL and LLL, and left thyroid gland nodule, UA + glucose and protein, Hgb 9.5, Hct 32.8, Glu 220, K+ 5.4, BUN 49, Cr 3.25, BNP 1,786. Case was discussed with cardiology and nephrology, who recommended IV diuresis. Pt. was started on IV diuretics and IV ATB and admitted to [facility] for further evaluation and treatment. Hospital course:    Acute hypoxic hypercapnic respiratory failure 2/2 CAP/COPD exacerbation/acute on chronic heart failure/large right pleural effusion-2 gm Na+ diet, I&O, daily weights, BiPAP --> supplemental O2, IV lasix, IV solu-medrol, IV Rocephin, IV Azithromycin, duonebs, cardiology consult, pulmonology consult, underwent thoracentesis on  (2300 ml of clear pleural fluid was obtained), TTE showed normal LVSF with 50-56% EF, impaired relaxation pattern of left ventricular diastolic filling, mildly reduced right ventricular systolic function, underwent RHC on  which showed mild pulmonary hypertension, diuretic changed to PO upon discharge, patient to F/U with cardiology after discharge  ANDREA-nephrology consult, renal US negative for left hydronephrosis, cardiorenal syndrome suspected 2/2 acute decompensated CHF, F/U with  nephrologist after discharge (Dr. Mitchell)  Generalized weakness-PT/OT evaluations, recommending SNF   Left knee pain-XR showed small joint effusion and degenerative changes, reticular increased density in the subcutaneous tissues which may represent lymphedema and/or cellulitis, Tylenol prn for pain   AFib/CAD/HLD-s/p Watchman in August 2023, c/w atorvastatin, amiodarone, metoprolol, ASA, plavix, monitored on telemetry   DM2-accuchecks, c/w lantus, ISS, jardiance held   GERD-PPI  Depression-c/w venlafaxine   Constipation-laxatives and stool softeners added    Pt. was HDS and discharged to [facility] on 2/15/24. On 2/18/24, patient was sent to the ER for lethargy and hypoxia. Work-up in ER: D-Dimer 13,011, BUN 58, Cr 2.88, WBC 6.7, Hgb 8.0, Trop 43, BNP 1,444, CXR showed stable pleural and parenchymal opacities in the mid and lower right hemithorax and a mild new left pleural effusion with left basilar atelectasis or edema, SpO2 82% on RA. Pt. was given Lasix 80 mg. US of BLE was negative for DVT. Pt. was placed on BiPAP and transferred to [facility] for further evaluation and treatment. Hospital course:     Acute on chronic hypoxic/hypercapnic respiratory failure 2/2 Influenza B, COPD exacerbation, and decompensated heart failure-BiPAP --> high-flow O2 --> supplemental O2, tamiflu, prednisone, duonebs, diuresis --> dialysis, pulmonology and cardiology consulted   Acute on chronic diastolic CHF-strict I&O, daily weight, 2 gm Na+ diet, c/w metoprolol, cardiology consult   Elevated D-Dimer-US negative for DVT, VQ scan showed low probability of PE  BLE edema/redness-possibly underlying cellulitis, leg elevation, local wound care, IV vancomycin  Acute on chronic anemia-required blood transfusion, iron started, CBC monitored   ANDREA on CKD-nephrology consulted, TDC placed to R chest on 3/1 by general surgery, dialysis started    Pt. was HDS and discharged back to [facility] on 3/5/24. On 3/7, patient was reported  to have increased BLE edema with fluid-filled blisters and c/o pain. Venous US was ordered, which was negative for DVT. On 3/9, patient was at dialysis and was observed to be confused and c/o dizziness. SpO2 was 66% and she was placed on 5 L of O2. Pt. refused to go to the ER, but eventually agreed to evaluation. Work-up in ER: CXR showed a right basilar opacity, Flu negative, COVID negative, RSV negative. Pt. was discharged back to SNF on Levaquin for pneumonia.     On 3/13, routine blood work revealed Hgb of 6.4. Pt. to be set-up for outpatient blood transfusion at [facility]. On 3/14, staff reported patient with low BP (77/58) and lethargy. She was sent to [facility] ER for evaluation. In the ER, /114, HR 65, RR 28, SpO2 95%. Labs were drawn, but the ER discharged patient to dialysis prior to the labs resulting. Transport reported that patient was very lethargic and drowsy on the ride to dialysis to the point where he almost took the patient back to the ER. Upon review of the ER labs, Glu was 24 and Hgb was 6.9. When patient arrived to dialysis, blood sugar was checked and was 47. She was given 1 tube of glucose and a snack with improvement noted in blood sugar to 147 as well as resolution of lethargy.     Patient is s/p blood transfusion on 3/15. Today, she denies dizziness, HA, vision changes, SOB, cough, or chest pain. Blood sugars reviewed in chart. Staff report no other clinical concerns at this time.     ROS:    As above in HPI. Otherwise, all other systems have been reviewed and are negative for complaint.    Medications reviewed and verified in NH chart.     Patient Active Problem List   Diagnosis   • Hypothyroidism   • Acute on chronic diastolic heart failure (Multi)   • Altered mental status   • Anxiety   • Chronic fatigue   • Constipation by delayed colonic transit   • Chronic obstructive pulmonary disease (Multi)   • Type 2 diabetes mellitus (Multi)   • Diabetic renal disease (Multi)   •  Gastroesophageal reflux disease   • History of renal cell carcinoma   • Hypertension   • Peripheral vascular disease (CMS-HCC)   • Leukocytosis   • Lung nodule   • Major depressive disorder, single episode, unspecified   • Osteoarthritis   • Paroxysmal atrial fibrillation (Multi)   • Primary insomnia   • Stage 3b chronic kidney disease (CKD) (Multi)   • Trouble walking   • Hyperlipidemia, unspecified   • Anemia due to chronic kidney disease, on chronic dialysis (Multi)   • ANDREA (acute kidney injury) (CMS-HCC)   • ASHD (arteriosclerotic heart disease)   • At risk for falling   • Essential tremor   • Vitamin D deficiency   • Acute on chronic respiratory failure with hypoxia (Multi)   • Pulmonary hypertension (Multi)   • Pulmonary edema ([facility])   • Influenza B   • History of right nephrectomy   • Cognitive impairment        Past Medical History:   Diagnosis Date   • Anal fissure 10/12/2023   • Anemia    • ASHD (arteriosclerotic heart disease)    • At risk for falls    • Atrial fibrillation (Multi)    • CHF (congestive heart failure) (Multi)    • CKD (chronic kidney disease)    • COPD (chronic obstructive pulmonary disease) (Multi)    • CVA (cerebral vascular accident) (Multi)     2021- right-sided weakness   • Depression    • Diabetes mellitus (Multi)    • GERD (gastroesophageal reflux disease)    • H/O pleural effusion    • Hyperlipidemia    • Hypertension    • Hypothyroidism    • Hypoxia    • Impacted cerumen, left ear     Impacted cerumen of left ear   • Noncompliance    • Osteoarthritis    • Perianal dermatitis 10/12/2023   • Personal history of other diseases of the respiratory system     History of acute bronchitis   • PVD (peripheral vascular disease) (CMS-HCC)    • Renal carcinoma, right (Multi)     s/p partial nephrectomy 8/2021   • Respiratory failure (Multi)    • TIA (transient ischemic attack)    • Vasculitis (CMS-HCC) 10/12/2023   • Weakness        Past Surgical History:   Procedure Laterality Date   •  ANKLE SURGERY         • CARDIAC CATHETERIZATION N/A 2024    Procedure: Right Heart Cath;  Surgeon: Nicholas Antonio MD;  Location: [facility] Cardiac Cath Lab;  Service: Cardiovascular;  Laterality: N/A;   • CATARACT EXTRACTION Right     2019   •  SECTION, CLASSIC     • MR CHEST ANGIO W AND WO IV CONTRAST  2023    MR CHEST ANGIO W AND WO IV CONTRAST 2023 [facility] MRI   • MR HEAD ANGIO WO IV CONTRAST  2021    MR HEAD ANGIO WO IV CONTRAST [facility]   • NEPHRECTOMY  2021   • SHOULDER SURGERY      2009       Family History   Problem Relation Name Age of Onset   • Hypertension Mother     • Diabetes Father     • Heart disease Father     • Cancer Sister     • Cancer Brother     • Diabetes Daughter     • Asthma Daughter     • Heart attack Daughter     • Diabetes Maternal Grandfather     • Heart disease Paternal Grandmother     • Diabetes Other     • Hypertension Other     • COPD Other     • Other (chronic lung disease) Other         Social History     Tobacco Use   Smoking Status Former   • Current packs/day: 0.50   • Average packs/day: 0.5 packs/day for 55.3 years (27.7 ttl pk-yrs)   • Types: Cigarettes   • Start date: 1969   • Passive exposure: Never   Smokeless Tobacco Never       Social History     Substance and Sexual Activity   Alcohol Use Not Currently       Social History     Substance and Sexual Activity   Drug Use Yes   • Types: Marijuana       Allergies   Allergen Reactions   • Citalopram Other, Rash and Hives     Facial breakout, blisters, and rash   • Adhesive Tape-Silicones Other     blisters   • Amoxicillin Swelling   • Bee Venom Protein (Honey Bee) Swelling   • Codeine Headache and Other     Migraines   • Latex Other     blisters   • Lisinopril Swelling     Facial swelling   • Prednisone Other     Yeast infection   • Tuberculin Ppd Unknown   • Doxycycline Rash   • Oseltamivir Rash   • Phenyleph-Shark Oil-Glyc-Pet Rash   • Phenylephrine Nausea/vomiting         Vital Signs:   185/65-65-18-97.3-98% on RA     Physical Exam:  General: Sitting up in WC in NAD, alert   Head/Face: NCAT, symmetrical  Eyes: PERRLA, no injection, no discharge  ENT: Hearing not impaired, ears without scars or lesions, nasal mucosa and turbinates pink, septum midline, lips pink and moist  Neck: Supple, symmetrical  Respiratory: CTA but diminished without adventitious sounds, respirations even and nonlabored without use of accessory muscles, good air exchange  Cardio: RRR without murmur or gallops, normal S1S2, + anasarca (improved), trace BLE edema (improved), pedal pulses 2+/4 bilaterally  Chest/Breast: Symmetrical, tunneled dialysis cath to R chest   GI: BS x 4, normoactive, non-distended, abd round and soft, no masses or tenderness  : No suprapubic tenderness or distention  MSK: Gait not assessed, joints with full ROM without pain or contractures  Skin: Skin warm and dry, no induration, dry skin to BLE  Neurologic: Cranial nerves II through XII intact, superficial touch and pain sensation intact  Psychiatric: Alert to person, place, and time, calm and cooperative     Results/Data:   4/11/24: K+ 4.7, Phos 4.7  3/27/24: Glu 108, BUN 23 Cr 1.59, GFR 34, CO2 34, Kel 7.9, Hgb 7.8, Hct 26.3  3/18/24: BUN 38, Cr 2.41, GFR 21, Na+ 136, K+ 3.5, Kel 7.4, Hgb 7.3, Hct 23.8, Plt 175  3/14/24: Glu 24, Hgb 6.9  3/9/24: Glu 95, K+ 3.4, CO2 33, BUN 7, Cr 0.84, GFR 75, Kel 7.6, Alb 2.6, Hgb 7.9, Hct 27.0  3/7/24: HgbA1C 7.5  3/6/24: HgbA1C 7.7, Chol 147, , HDL 65, LDL 51    Assessment/Plan:  Acute on chronic respiratory failure with hypoxia and hypercapnia 2/2 pneumonia/COPD exacerbation/acute on chronic HFpEF/right pleural effusion-c/w supplemental O2 as needed, 2 gm Na+ diet, monitor weights, 1500 ml fluid restriction, s/p thoracentesis (2/2/24) with 2.3 L of fluid removed, c/w dialysis as scheduled, s/p Levaquin (end date 3/18), mikaela prn, monitor respiratory status closely  Physical  deconditioning/generalized weakness-c/w PT/OT, LCD 4/10 --> appealing for more therapy, safety and fall precautions   ANDREA on CKD-in setting of acute decompensated heart failure, avoid nephrotoxic drugs, c/w dialysis, monitor R chest dialysis catheter site, monitor renal function, F/U with nephrologist as scheduled  AFib/CAD/HTN/HLD-s/p Watchman procedure (Aug. 2023), s/p RHC (2/27/24) which showed mild pulmonary hypertension, c/w amiodarone, aspirin, atorvastatin, metoprolol, and plavix, monitor BP and HR, F/U with cardiologist as scheduled  Hypokalemia-monitor, replete as needed  Anemia-s/p 1 unit of PRBCs on 3/15, c/w iron supplement, monitor CBC, transfuse for Hgb less than 7.0  GERD-c/w PPI  Depression-c/w venlafaxine (increased to 150 mg daily on 3/11 per psych), supportive care, Psych following  DM2-LCS diet, accuchecks, c/w humalog ISS with meals, c/w basaglar 10 units daily, HS snack, BP and lipid control, yearly eye exam, diabetic foot care, monitor HgbA1C  BLE edema/pain-elevate as tolerated, tubi- to BLE, local wound care, c/w Tylenol ES 1000 mg BID, consider gabapentin for neuropathy, monitor closely  Influenza B-RESOLVED, positive on 2/19/24, treated with Tamiflu  Hypoalbuminemia-increase protein intake, monitor albumin level, RD consult  Hypothyroidism/left thyroid nodule-c/w levothyroxine, monitor TSH and FT4, F/U with PCP after discharge for thyroid ultrasound  Dry skin of BLE-c/w ammonium lactate lotion, monitor closely     Orders:  NNO    Code Status:   Full Code      Electronically Signed By: GREGORIA Olivares   4/23/24  4:46 PM

## 2024-04-16 NOTE — PROGRESS NOTES
PLACE OF SERVICE:  [facility]    This is a subsequent visit.    Subjective   Patient ID: Daphne Morris is a 68 y.o. female who presents for Follow-up.    Ms. Cecelia Morris is a 68-year-old female with history of congestive heart failure with COPD.  She has had a prior CVA affecting her right side.  She is unable to care for herself and requires supportive care.    Review of Systems   Constitutional:  Negative for chills and fever.   Cardiovascular:  Negative for chest pain.   All other systems reviewed and are negative.    Objective   /82   Pulse 80   Temp 36.7 °C (98.1 °F)   Resp 18     Physical Exam  Vitals reviewed.   Constitutional:       General: She is not in acute distress.     Comments: This is a well-developed, well-nourished female, sitting in a chair   HENT:      Right Ear: Tympanic membrane, ear canal and external ear normal.      Left Ear: Tympanic membrane, ear canal and external ear normal.   Eyes:      General: No scleral icterus.     Pupils: Pupils are equal, round, and reactive to light.   Neck:      Vascular: No carotid bruit.   Cardiovascular:      Heart sounds: Normal heart sounds, S1 normal and S2 normal. No murmur heard.     No friction rub.   Pulmonary:      Breath sounds: Decreased breath sounds (throughout) present.   Abdominal:      Palpations: There is no hepatomegaly, splenomegaly or mass.   Musculoskeletal:         General: No swelling or deformity. Normal range of motion.      Cervical back: Neck supple.      Right lower leg: Edema present.      Left lower leg: Edema present.   Lymphadenopathy:      Cervical: No cervical adenopathy.      Upper Body:      Right upper body: No axillary adenopathy.      Left upper body: No axillary adenopathy.      Lower Body: No right inguinal adenopathy. No left inguinal adenopathy.   Neurological:      Mental Status: She is oriented to person, place, and time.      Cranial Nerves: Cranial nerves  2-12 are intact. No cranial nerve deficit.      Sensory: No sensory deficit.      Motor: Weakness (right-sided) present.      Gait: Gait is intact.      Deep Tendon Reflexes: Reflexes normal.   Psychiatric:         Mood and Affect: Mood normal. Mood is not anxious or depressed. Affect is not angry.         Behavior: Behavior is not agitated.         Thought Content: Thought content normal.         Judgment: Judgment normal.     LAB WORK:  Laboratory studies were reviewed.    Assessment/Plan   Problem List Items Addressed This Visit             ICD-10-CM       Cardiac and Vasculature    Hypertension I10    Hyperlipidemia, unspecified E78.5       Endocrine/Metabolic    Hypothyroidism E03.9    Type 2 diabetes mellitus (Multi) E11.9       Gastrointestinal and Abdominal    Gastroesophageal reflux disease K21.9     Other Visit Diagnoses         Codes    Congestive heart failure, unspecified HF chronicity, unspecified heart failure type (Multi)    -  Primary I50.9    COPD on long-term inhaled steroid therapy (Multi)     J44.9, Z79.51    Respiratory failure, unspecified chronicity, unspecified whether with hypoxia or hypercapnia (Multi)     J96.90    Cerebrovascular accident (CVA), unspecified mechanism (Multi)     I63.9    Atrial fibrillation, unspecified type (Multi)     I48.91    Anemia, unspecified type     D64.9    Depression, unspecified depression type     F32.A        1. Heart failure, on diuretic.  2. COPD, on bronchodilator therapy.  3. Respiratory failure, on oxygen as needed.  4. Diabetes, on insulin.  5. CVA, on supportive care.  6. Hypertension, medically controlled.  7. Atrial fibrillation, on rate control.  8. Anemia.  Follow CBC.  9. Depression, on medication.  10. Hyperlipidemia, on statin.  11. Hypothyroidism, on levothyroxine.  12. Reflux disease, on PPI.    Scribe Attestation  By signing my name below, Ann RODRIGUEZ Scribe attest that this documentation has been prepared under the direction and in the  presence of Cale Lowe MD.     All medical record entries made by the scribe were personally dictated by me I have reviewed the chart and agree the record accurately reflects my personal performance of his history physical examination and management

## 2024-04-20 ENCOUNTER — NURSING HOME VISIT (OUTPATIENT)
Dept: POST ACUTE CARE | Facility: EXTERNAL LOCATION | Age: 69
End: 2024-04-20
Payer: MEDICARE

## 2024-04-20 DIAGNOSIS — Z79.4 TYPE 2 DIABETES MELLITUS WITHOUT COMPLICATION, WITH LONG-TERM CURRENT USE OF INSULIN (MULTI): ICD-10-CM

## 2024-04-20 DIAGNOSIS — I10 HYPERTENSION, UNSPECIFIED TYPE: ICD-10-CM

## 2024-04-20 DIAGNOSIS — E03.9 HYPOTHYROIDISM, UNSPECIFIED TYPE: ICD-10-CM

## 2024-04-20 DIAGNOSIS — F32.A DEPRESSION, UNSPECIFIED DEPRESSION TYPE: ICD-10-CM

## 2024-04-20 DIAGNOSIS — R09.02 HYPOXIA: ICD-10-CM

## 2024-04-20 DIAGNOSIS — D64.9 ANEMIA, UNSPECIFIED TYPE: ICD-10-CM

## 2024-04-20 DIAGNOSIS — Z91.81 AT RISK FOR FALLING: ICD-10-CM

## 2024-04-20 DIAGNOSIS — I63.9 CEREBROVASCULAR ACCIDENT (CVA), UNSPECIFIED MECHANISM (MULTI): ICD-10-CM

## 2024-04-20 DIAGNOSIS — E11.9 TYPE 2 DIABETES MELLITUS WITHOUT COMPLICATION, WITH LONG-TERM CURRENT USE OF INSULIN (MULTI): ICD-10-CM

## 2024-04-20 DIAGNOSIS — I48.91 ATRIAL FIBRILLATION, UNSPECIFIED TYPE (MULTI): ICD-10-CM

## 2024-04-20 DIAGNOSIS — R53.1 WEAKNESS: ICD-10-CM

## 2024-04-20 DIAGNOSIS — Z79.51 COPD ON LONG-TERM INHALED STEROID THERAPY (MULTI): ICD-10-CM

## 2024-04-20 DIAGNOSIS — I50.9 CONGESTIVE HEART FAILURE, UNSPECIFIED HF CHRONICITY, UNSPECIFIED HEART FAILURE TYPE (MULTI): Primary | ICD-10-CM

## 2024-04-20 DIAGNOSIS — J44.9 COPD ON LONG-TERM INHALED STEROID THERAPY (MULTI): ICD-10-CM

## 2024-04-20 PROCEDURE — 99309 SBSQ NF CARE MODERATE MDM 30: CPT | Performed by: INTERNAL MEDICINE

## 2024-04-20 NOTE — LETTER
Patient: Daphne Morris  : 1955    Encounter Date: 2024    PLACE OF SERVICE:  [facility] [place].    This is a subsequent visit.    Subjective  Patient ID: Daphne Morris is a 68 y.o. female who presents for Follow-up.    Ms. Cecelia Morris is a 68-year-old female with history of chronic respiratory failure.  She suffers from COPD and CHF.  She has had a prior stroke.  She is unable to care for herself.  She requires supportive care.    Review of Systems   Constitutional:  Negative for chills and fever.   Cardiovascular:  Negative for chest pain.   All other systems reviewed and are negative.    Objective  /82   Pulse 84   Temp 36.7 °C (98.1 °F)   Resp 18     Physical Exam  Vitals reviewed.   Constitutional:       General: She is not in acute distress.     Comments: This is a well-developed, well-nourished female, sitting in a chair.   HENT:      Right Ear: Tympanic membrane, ear canal and external ear normal.      Left Ear: Tympanic membrane, ear canal and external ear normal.   Eyes:      General: No scleral icterus.     Pupils: Pupils are equal, round, and reactive to light.   Neck:      Vascular: No carotid bruit.   Cardiovascular:      Heart sounds: Normal heart sounds, S1 normal and S2 normal. No murmur heard.     No friction rub.   Pulmonary:      Effort: Pulmonary effort is normal.      Breath sounds: Decreased breath sounds (throughout) present.   Abdominal:      Palpations: There is no hepatomegaly, splenomegaly or mass.   Musculoskeletal:         General: No swelling or deformity. Normal range of motion.      Cervical back: Neck supple.      Right lower leg: Edema present.      Left lower leg: Edema present.   Lymphadenopathy:      Cervical: No cervical adenopathy.      Upper Body:      Right upper body: No axillary adenopathy.      Left upper body: No axillary adenopathy.      Lower Body: No right inguinal adenopathy. No left inguinal adenopathy.    Neurological:      Mental Status: She is oriented to person, place, and time.      Cranial Nerves: Cranial nerves 2-12 are intact. No cranial nerve deficit.      Sensory: No sensory deficit.      Motor: Motor function is intact. No weakness.      Gait: Gait is intact.      Deep Tendon Reflexes: Reflexes normal.   Psychiatric:         Mood and Affect: Mood normal. Mood is not anxious or depressed. Affect is not angry.         Behavior: Behavior is not agitated.         Thought Content: Thought content normal.         Judgment: Judgment normal.     LAB WORK:  Laboratory studies were reviewed.    Assessment/Plan  Problem List Items Addressed This Visit             ICD-10-CM       Advance Directives and General Issues    At risk for falling Z91.81       Cardiac and Vasculature    Hypertension I10       Endocrine/Metabolic    Hypothyroidism E03.9    Type 2 diabetes mellitus (Multi) E11.9     Other Visit Diagnoses         Codes    Congestive heart failure, unspecified HF chronicity, unspecified heart failure type (Multi)    -  Primary I50.9    COPD on long-term inhaled steroid therapy (Multi)     J44.9, Z79.51    Hypoxia     R09.02    Weakness     R53.1    Cerebrovascular accident (CVA), unspecified mechanism (Multi)     I63.9    Atrial fibrillation, unspecified type (Multi)     I48.91    Depression, unspecified depression type     F32.A    Anemia, unspecified type     D64.9        1. Heart failure, on diuretic.  2. COPD, on bronchodilator therapy.  3. Hypoxia, on oxygen.  4. Weakness, on PT/OT.  5. Fall risk, on fall precautions.  6. CVA, on supportive care.  7. Hypertension, medically controlled.  8. Diabetes, on insulin.  9. Atrial fibrillation, on rate control.  10. Depression, on medication.  11. Anemia.  Follow CBC.  12. Hypothyroidism, on levothyroxine.    Scribe Attestation  By signing my name below, Joann RODRIGUEZ, Scrkayla attest that this documentation has been prepared under the direction and in the presence of  Cale Lowe MD.       All medical record entries made by the scribe were personally dictated by me I have reviewed the chart and agree the record accurately reflects my personal performance of his history physical examination and management      Electronically Signed By: Cale Lowe MD   4/28/24 12:39 AM

## 2024-04-21 NOTE — PROGRESS NOTES
Chief Complaint:   SNF F/U  Acute on chronic diastolic heart failure  Physical deconditioning/weakness  Acute hypoxic respiratory failure   Pleural effusion  ANDREA  Cardiorenal syndrome   Anemia    HPI:   68 year-old female presenting to [facility] ER on 2/1/24 with shortness of breath, difficulty ambulating, generalized weakness, and BLE edema. Work-up in ER: /63, HR 53, Temp 36.6, RR 20, SpO2 95%, EKG showed sinus bradycardia, CXR showed a new large right pleural effusion and basilar atelectasis, underlying pneumonia is not excluded, CT of chest showed large right pleural effusion with complete collapse of the RLL, atelectasis/consolidation at the RUL and RML, tree-in-bud airspace opacities at the RUL and LLL, and left thyroid gland nodule, UA + glucose and protein, Hgb 9.5, Hct 32.8, Glu 220, K+ 5.4, BUN 49, Cr 3.25, BNP 1,786. Case was discussed with cardiology and nephrology, who recommended IV diuresis. Pt. was started on IV diuretics and IV ATB and admitted to [facility] for further evaluation and treatment. Hospital course:    Acute hypoxic hypercapnic respiratory failure 2/2 CAP/COPD exacerbation/acute on chronic heart failure/large right pleural effusion-2 gm Na+ diet, I&O, daily weights, BiPAP --> supplemental O2, IV lasix, IV solu-medrol, IV Rocephin, IV Azithromycin, duonebs, cardiology consult, pulmonology consult, underwent thoracentesis on 2/2 (2300 ml of clear pleural fluid was obtained), TTE showed normal LVSF with 50-56% EF, impaired relaxation pattern of left ventricular diastolic filling, mildly reduced right ventricular systolic function, underwent RHC on 2/7 which showed mild pulmonary hypertension, diuretic changed to PO upon discharge, patient to F/U with cardiology after discharge  ANDREA-nephrology consult, renal US negative for left hydronephrosis, cardiorenal syndrome suspected 2/2 acute decompensated CHF, F/U with nephrologist after discharge (Dr. Mitchell)  Generalized weakness-PT/OT  evaluations, recommending SNF   Left knee pain-XR showed small joint effusion and degenerative changes, reticular increased density in the subcutaneous tissues which may represent lymphedema and/or cellulitis, Tylenol prn for pain   AFib/CAD/HLD-s/p Watchman in August 2023, c/w atorvastatin, amiodarone, metoprolol, ASA, plavix, monitored on telemetry   DM2-accuchecks, c/w lantus, ISS, jardiance held   GERD-PPI  Depression-c/w venlafaxine   Constipation-laxatives and stool softeners added    Pt. was HDS and discharged to [facility] on 2/15/24. On 2/18/24, patient was sent to the ER for lethargy and hypoxia. Work-up in ER: D-Dimer 13,011, BUN 58, Cr 2.88, WBC 6.7, Hgb 8.0, Trop 43, BNP 1,444, CXR showed stable pleural and parenchymal opacities in the mid and lower right hemithorax and a mild new left pleural effusion with left basilar atelectasis or edema, SpO2 82% on RA. Pt. was given Lasix 80 mg. US of BLE was negative for DVT. Pt. was placed on BiPAP and transferred to [facility] for further evaluation and treatment. Hospital course:     Acute on chronic hypoxic/hypercapnic respiratory failure 2/2 Influenza B, COPD exacerbation, and decompensated heart failure-BiPAP --> high-flow O2 --> supplemental O2, tamiflu, prednisone, duonebs, diuresis --> dialysis, pulmonology and cardiology consulted   Acute on chronic diastolic CHF-strict I&O, daily weight, 2 gm Na+ diet, c/w metoprolol, cardiology consult   Elevated D-Dimer-US negative for DVT, VQ scan showed low probability of PE  BLE edema/redness-possibly underlying cellulitis, leg elevation, local wound care, IV vancomycin  Acute on chronic anemia-required blood transfusion, iron started, CBC monitored   ANDREA on CKD-nephrology consulted, TDC placed to R chest on 3/1 by general surgery, dialysis started    Pt. was HDS and discharged back to [facility] on 3/5/24. On 3/7, patient was reported to have increased BLE edema with fluid-filled blisters and c/o pain.  "Venous US was ordered, which was negative for DVT. On 3/9, patient was at dialysis and was observed to be confused and c/o dizziness. SpO2 was 66% and she was placed on 5 L of O2. Pt. refused to go to the ER, but eventually agreed to evaluation. Work-up in ER: CXR showed a right basilar opacity, Flu negative, COVID negative, RSV negative. Pt. was discharged back to SNF on Levaquin for pneumonia.     On 3/13, routine blood work revealed Hgb of 6.4. Pt. to be set-up for outpatient blood transfusion at [facility]. On 3/14, staff reported patient with low BP (77/58) and lethargy. She was sent to [facility] ER for evaluation. In the ER, /114, HR 65, RR 28, SpO2 95%. Labs were drawn, but the ER discharged patient to dialysis prior to the labs resulting. Transport reported that patient was very lethargic and drowsy on the ride to dialysis to the point where he almost took the patient back to the ER. Upon review of the ER labs, Glu was 24 and Hgb was 6.9. When patient arrived to dialysis, blood sugar was checked and was 47. She was given 1 tube of glucose and a snack with improvement noted in blood sugar to 147 as well as resolution of lethargy.     Patient is s/p blood transfusion on 3/15. Today, she denies dizziness, HA, vision changes, SOB, cough, or chest pain. She reports improvement in generalized edema. She continues to report BLE pain, that is described as \"burning\". She reports improvement in appetite. Blood sugars reviewed in chart. Staff report no other clinical concerns at this time.     ROS:    As above in HPI. Otherwise, all other systems have been reviewed and are negative for complaint.    Medications reviewed and verified in NH chart.     Patient Active Problem List   Diagnosis    Hypothyroidism    Acute on chronic diastolic heart failure (Multi)    Altered mental status    Anxiety    Chronic fatigue    Constipation by delayed colonic transit    Chronic obstructive pulmonary disease (Multi)    Type 2 " diabetes mellitus (Multi)    Diabetic renal disease (Multi)    Gastroesophageal reflux disease    History of renal cell carcinoma    Hypertension    Peripheral vascular disease (CMS-HCC)    Leukocytosis    Lung nodule    Major depressive disorder, single episode, unspecified    Osteoarthritis    Paroxysmal atrial fibrillation (Multi)    Primary insomnia    Stage 3b chronic kidney disease (CKD) (Multi)    Trouble walking    Hyperlipidemia, unspecified    Anemia due to chronic kidney disease, on chronic dialysis (Multi)    ANDREA (acute kidney injury) (CMS-HCC)    ASHD (arteriosclerotic heart disease)    At risk for falling    Essential tremor    Vitamin D deficiency    Acute on chronic respiratory failure with hypoxia (Multi)    Pulmonary hypertension (Multi)    Pulmonary edema ([facility])    Influenza B    History of right nephrectomy    Cognitive impairment        Past Medical History:   Diagnosis Date    Anal fissure 10/12/2023    Anemia     ASHD (arteriosclerotic heart disease)     At risk for falls     Atrial fibrillation (Multi)     CHF (congestive heart failure) (Multi)     CKD (chronic kidney disease)     COPD (chronic obstructive pulmonary disease) (Multi)     CVA (cerebral vascular accident) (Multi)     2021- right-sided weakness    Depression     Diabetes mellitus (Multi)     GERD (gastroesophageal reflux disease)     H/O pleural effusion     Hyperlipidemia     Hypertension     Hypothyroidism     Hypoxia     Impacted cerumen, left ear     Impacted cerumen of left ear    Noncompliance     Osteoarthritis     Perianal dermatitis 10/12/2023    Personal history of other diseases of the respiratory system     History of acute bronchitis    PVD (peripheral vascular disease) (CMS-HCC)     Renal carcinoma, right (Multi)     s/p partial nephrectomy 8/2021    Respiratory failure (Multi)     TIA (transient ischemic attack)     Vasculitis (CMS-HCC) 10/12/2023    Weakness        Past Surgical History:   Procedure Laterality  Date    ANKLE SURGERY          CARDIAC CATHETERIZATION N/A 2024    Procedure: Right Heart Cath;  Surgeon: Nicholas Antonio MD;  Location: [facility] Cardiac Cath Lab;  Service: Cardiovascular;  Laterality: N/A;    CATARACT EXTRACTION Right     2019     SECTION, CLASSIC      MR CHEST ANGIO W AND WO IV CONTRAST  2023    MR CHEST ANGIO W AND WO IV CONTRAST 2023 [facility] [place] [facility]    MR HEAD ANGIO WO IV CONTRAST  2021    MR HEAD ANGIO WO IV CONTRAST [facility]    NEPHRECTOMY  2021    SHOULDER SURGERY             Family History   Problem Relation Name Age of Onset    Hypertension Mother      Diabetes Father      Heart disease Father      Cancer Sister      Cancer Brother      Diabetes Daughter      Asthma Daughter      Heart attack Daughter      Diabetes Maternal Grandfather      Heart disease Paternal Grandmother      Diabetes Other      Hypertension Other      COPD Other         Social History     Tobacco Use   Smoking Status Former    Current packs/day: 0.50    Average packs/day: 0.5 packs/day for 55.3 years (27.7 ttl pk-yrs)    Types: Cigarettes    Start date: 1969    Passive exposure: Never   Smokeless Tobacco Never       Social History     Substance and Sexual Activity   Alcohol Use Not Currently       Social History     Substance and Sexual Activity   Drug Use Yes    Types: Marijuana       Allergies   Allergen Reactions    Citalopram Other, Rash and Hives     Facial breakout, blisters, and rash    Adhesive Tape-Silicones Other     blisters    Amoxicillin Swelling    Bee Venom Protein (Honey Bee) Swelling    Codeine Headache and Other     Migraines    Latex Other     blisters    Lisinopril Swelling     Facial swelling    Prednisone Other     Yeast infection    Tuberculin Ppd Unknown    Doxycycline Rash    Oseltamivir Rash    Phenyleph-Shark Oil-Glyc-Pet Rash    Phenylephrine Nausea/vomiting        Vital Signs:   109/58-64-19-97.6-97% on RA     Physical  Exam:  General: Sitting up in WC in NAD, alert   Head/Face: NCAT, symmetrical  Eyes: PERRLA, no injection, no discharge  ENT: Hearing not impaired, ears without scars or lesions, nasal mucosa and turbinates pink, septum midline, lips pink and moist  Neck: Supple, symmetrical  Respiratory: CTA but diminished without adventitious sounds, respirations even and nonlabored without use of accessory muscles, good air exchange  Cardio: RRR without murmur or gallops, normal S1S2, + anasarca (improved), BLE NP edema (improved), pedal pulses 2+/4 bilaterally  Chest/Breast: Symmetrical, tunneled dialysis cath to R chest   GI: BS x 4, normoactive, non-distended, abd round and soft, no masses or tenderness  : No suprapubic tenderness or distention  MSK: Gait not assessed, joints with full ROM without pain or contractures  Skin: Skin warm and dry, no induration, tubi- intact to BLE  Neurologic: Cranial nerves II through XII intact, superficial touch and pain sensation intact  Psychiatric: Alert to person, place, and time, calm and cooperative     Results/Data:   3/18/24: BUN 38, Cr 2.41, GFR 21, Na+ 136, K+ 3.5, Kel 7.4, Hgb 7.3, Hct 23.8, Plt 175  3/14/24: Glu 24, Hgb 6.9  3/9/24: Glu 95, K+ 3.4, CO2 33, BUN 7, Cr 0.84, GFR 75, Kel 7.6, Alb 2.6, Hgb 7.9, Hct 27.0  3/6/24: HgbA1C 7.7, Chol 147, , HDL 65, LDL 51    Assessment/Plan:  Acute on chronic respiratory failure with hypoxia and hypercapnia 2/2 pneumonia/COPD exacerbation/acute on chronic HFpEF/right pleural effusion-c/w supplemental O2 as needed, 2 gm Na+ diet, monitor weights, 1500 ml fluid restriction, s/p thoracentesis (2/2/24) with 2.3 L of fluid removed, c/w dialysis as scheduled, s/p Levaquin (end date 3/18), duonebs prn, monitor respiratory status closely  Physical deconditioning/generalized weakness-c/w PT/OT, safety and fall precautions   ANDREA on CKD-in setting of acute decompensated heart failure, avoid nephrotoxic drugs, c/w dialysis, monitor R chest  dialysis catheter site, monitor renal function, F/U with nephrologist as scheduled  AFib/CAD/HTN/HLD-s/p Watchman procedure (Aug. 2023), s/p RHC (2/27/24) which showed mild pulmonary hypertension, c/w amiodarone, aspirin, atorvastatin, metoprolol, and plavix, monitor BP and HR, F/U with cardiologist as scheduled  Hypokalemia-monitor, replete as needed  Anemia-s/p 1 unit of PRBCs on 3/15, c/w iron supplement, monitor CBC, transfuse for Hgb less than 7.0  GERD-c/w PPI  Depression-c/w venlafaxine (increased to 150 mg daily on 3/11 per psych), supportive care, Psych following  DM2-LCS diet, accuchecks, c/w humalog ISS with meals, c/w basaglar 10 units daily, HS snack, BP and lipid control, yearly eye exam, diabetic foot care, monitor HgbA1C  BLE edema/pain-elevate as tolerated, tubi- to BLE, local wound care, c/w Tylenol ES 1000 mg BID, consider gabapentin for neuropathy, monitor closely  Influenza B-RESOLVED, positive on 2/19/24, treated with Tamiflu  Hypoalbuminemia-increase protein intake, monitor albumin level, RD consult  Hypothyroidism/left thyroid nodule-c/w levothyroxine, monitor TSH and FT4, F/U with PCP after discharge for thyroid ultrasound    Orders:  NNO    Code Status:   Full Code

## 2024-04-22 VITALS
DIASTOLIC BLOOD PRESSURE: 82 MMHG | RESPIRATION RATE: 18 BRPM | TEMPERATURE: 98.1 F | HEART RATE: 84 BPM | SYSTOLIC BLOOD PRESSURE: 120 MMHG

## 2024-04-22 ASSESSMENT — ENCOUNTER SYMPTOMS
FEVER: 0
CHILLS: 0

## 2024-04-22 NOTE — PROGRESS NOTES
PLACE OF SERVICE:  \[facility]\"" [facility] [place].    This is a subsequent visit.    Subjective   Patient ID: Daphne Morris is a 68 y.o. female who presents for Follow-up.    Ms. Cecelia Morris is a 68-year-old female with history of chronic respiratory failure.  She suffers from COPD and CHF.  She has had a prior stroke.  She is unable to care for herself.  She requires supportive care.    Review of Systems   Constitutional:  Negative for chills and fever.   Cardiovascular:  Negative for chest pain.   All other systems reviewed and are negative.    Objective   /82   Pulse 84   Temp 36.7 °C (98.1 °F)   Resp 18     Physical Exam  Vitals reviewed.   Constitutional:       General: She is not in acute distress.     Comments: This is a well-developed, well-nourished female, sitting in a chair.   HENT:      Right Ear: Tympanic membrane, ear canal and external ear normal.      Left Ear: Tympanic membrane, ear canal and external ear normal.   Eyes:      General: No scleral icterus.     Pupils: Pupils are equal, round, and reactive to light.   Neck:      Vascular: No carotid bruit.   Cardiovascular:      Heart sounds: Normal heart sounds, S1 normal and S2 normal. No murmur heard.     No friction rub.   Pulmonary:      Effort: Pulmonary effort is normal.      Breath sounds: Decreased breath sounds (throughout) present.   Abdominal:      Palpations: There is no hepatomegaly, splenomegaly or mass.   Musculoskeletal:         General: No swelling or deformity. Normal range of motion.      Cervical back: Neck supple.      Right lower leg: Edema present.      Left lower leg: Edema present.   Lymphadenopathy:      Cervical: No cervical adenopathy.      Upper Body:      Right upper body: No axillary adenopathy.      Left upper body: No axillary adenopathy.      Lower Body: No right inguinal adenopathy. No left inguinal adenopathy.   Neurological:      Mental Status: She is oriented to person, place,  and time.      Cranial Nerves: Cranial nerves 2-12 are intact. No cranial nerve deficit.      Sensory: No sensory deficit.      Motor: Motor function is intact. No weakness.      Gait: Gait is intact.      Deep Tendon Reflexes: Reflexes normal.   Psychiatric:         Mood and Affect: Mood normal. Mood is not anxious or depressed. Affect is not angry.         Behavior: Behavior is not agitated.         Thought Content: Thought content normal.         Judgment: Judgment normal.     LAB WORK:  Laboratory studies were reviewed.    Assessment/Plan   Problem List Items Addressed This Visit             ICD-10-CM       Advance Directives and General Issues    At risk for falling Z91.81       Cardiac and Vasculature    Hypertension I10       Endocrine/Metabolic    Hypothyroidism E03.9    Type 2 diabetes mellitus (Multi) E11.9     Other Visit Diagnoses         Codes    Congestive heart failure, unspecified HF chronicity, unspecified heart failure type (Multi)    -  Primary I50.9    COPD on long-term inhaled steroid therapy (Multi)     J44.9, Z79.51    Hypoxia     R09.02    Weakness     R53.1    Cerebrovascular accident (CVA), unspecified mechanism (Multi)     I63.9    Atrial fibrillation, unspecified type (Multi)     I48.91    Depression, unspecified depression type     F32.A    Anemia, unspecified type     D64.9        1. Heart failure, on diuretic.  2. COPD, on bronchodilator therapy.  3. Hypoxia, on oxygen.  4. Weakness, on PT/OT.  5. Fall risk, on fall precautions.  6. CVA, on supportive care.  7. Hypertension, medically controlled.  8. Diabetes, on insulin.  9. Atrial fibrillation, on rate control.  10. Depression, on medication.  11. Anemia.  Follow CBC.  12. Hypothyroidism, on levothyroxine.    Scribe Attestation  By signing my name below, Joann RODRIGUEZ Scribe attest that this documentation has been prepared under the direction and in the presence of Cale Lowe MD.       All medical record entries made by the  luna were personally dictated by me I have reviewed the chart and agree the record accurately reflects my personal performance of his history physical examination and management

## 2024-04-23 ENCOUNTER — NURSING HOME VISIT (OUTPATIENT)
Dept: POST ACUTE CARE | Facility: EXTERNAL LOCATION | Age: 69
End: 2024-04-23
Payer: MEDICARE

## 2024-04-23 DIAGNOSIS — I13.0 CARDIORENAL SYNDROME WITH RENAL FAILURE, STAGE 1-4 OR UNSPECIFIED CHRONIC KIDNEY DISEASE, WITH HEART FAILURE (MULTI): ICD-10-CM

## 2024-04-23 DIAGNOSIS — E11.9 TYPE 2 DIABETES MELLITUS WITHOUT COMPLICATION, WITH LONG-TERM CURRENT USE OF INSULIN (MULTI): ICD-10-CM

## 2024-04-23 DIAGNOSIS — R60.1 ANASARCA: ICD-10-CM

## 2024-04-23 DIAGNOSIS — E16.2 HYPOGLYCEMIA: ICD-10-CM

## 2024-04-23 DIAGNOSIS — E03.9 HYPOTHYROIDISM, UNSPECIFIED TYPE: ICD-10-CM

## 2024-04-23 DIAGNOSIS — R53.81 PHYSICAL DECONDITIONING: ICD-10-CM

## 2024-04-23 DIAGNOSIS — I48.91 ATRIAL FIBRILLATION, UNSPECIFIED TYPE (MULTI): ICD-10-CM

## 2024-04-23 DIAGNOSIS — J96.21 ACUTE ON CHRONIC RESPIRATORY FAILURE WITH HYPOXIA AND HYPERCAPNIA (MULTI): ICD-10-CM

## 2024-04-23 DIAGNOSIS — Z79.4 TYPE 2 DIABETES MELLITUS WITHOUT COMPLICATION, WITH LONG-TERM CURRENT USE OF INSULIN (MULTI): ICD-10-CM

## 2024-04-23 DIAGNOSIS — J96.21 ACUTE ON CHRONIC RESPIRATORY FAILURE WITH HYPOXIA (MULTI): Primary | ICD-10-CM

## 2024-04-23 DIAGNOSIS — J96.22 ACUTE ON CHRONIC RESPIRATORY FAILURE WITH HYPOXIA AND HYPERCAPNIA (MULTI): ICD-10-CM

## 2024-04-23 DIAGNOSIS — K21.9 GASTROESOPHAGEAL REFLUX DISEASE WITHOUT ESOPHAGITIS: ICD-10-CM

## 2024-04-23 PROCEDURE — 99309 SBSQ NF CARE MODERATE MDM 30: CPT | Performed by: NURSE PRACTITIONER

## 2024-04-23 NOTE — PROGRESS NOTES
Chief Complaint:   SNF F/U  Acute on chronic diastolic heart failure  Physical deconditioning/weakness  Acute hypoxic respiratory failure   Pleural effusion  ANDREA  Cardiorenal syndrome   Anemia    HPI:   68 year-old female presenting to [facility] ER on 2/1/24 with shortness of breath, difficulty ambulating, generalized weakness, and BLE edema. Work-up in ER: /63, HR 53, Temp 36.6, RR 20, SpO2 95%, EKG showed sinus bradycardia, CXR showed a new large right pleural effusion and basilar atelectasis, underlying pneumonia is not excluded, CT of chest showed large right pleural effusion with complete collapse of the RLL, atelectasis/consolidation at the RUL and RML, tree-in-bud airspace opacities at the RUL and LLL, and left thyroid gland nodule, UA + glucose and protein, Hgb 9.5, Hct 32.8, Glu 220, K+ 5.4, BUN 49, Cr 3.25, BNP 1,786. Case was discussed with cardiology and nephrology, who recommended IV diuresis. Pt. was started on IV diuretics and IV ATB and admitted to [facility] for further evaluation and treatment. Hospital course:    Acute hypoxic hypercapnic respiratory failure 2/2 CAP/COPD exacerbation/acute on chronic heart failure/large right pleural effusion-2 gm Na+ diet, I&O, daily weights, BiPAP --> supplemental O2, IV lasix, IV solu-medrol, IV Rocephin, IV Azithromycin, duonebs, cardiology consult, pulmonology consult, underwent thoracentesis on 2/2 (2300 ml of clear pleural fluid was obtained), TTE showed normal LVSF with 50-56% EF, impaired relaxation pattern of left ventricular diastolic filling, mildly reduced right ventricular systolic function, underwent RHC on 2/7 which showed mild pulmonary hypertension, diuretic changed to PO upon discharge, patient to F/U with cardiology after discharge  ANDREA-nephrology consult, renal US negative for left hydronephrosis, cardiorenal syndrome suspected 2/2 acute decompensated CHF, F/U with nephrologist after discharge (Dr. Mitchell)  Generalized weakness-PT/OT  evaluations, recommending SNF   Left knee pain-XR showed small joint effusion and degenerative changes, reticular increased density in the subcutaneous tissues which may represent lymphedema and/or cellulitis, Tylenol prn for pain   AFib/CAD/HLD-s/p Watchman in August 2023, c/w atorvastatin, amiodarone, metoprolol, ASA, plavix, monitored on telemetry   DM2-accuchecks, c/w lantus, ISS, jardiance held   GERD-PPI  Depression-c/w venlafaxine   Constipation-laxatives and stool softeners added    Pt. was HDS and discharged to [facility] on 2/15/24. On 2/18/24, patient was sent to the ER for lethargy and hypoxia. Work-up in ER: D-Dimer 13,011, BUN 58, Cr 2.88, WBC 6.7, Hgb 8.0, Trop 43, BNP 1,444, CXR showed stable pleural and parenchymal opacities in the mid and lower right hemithorax and a mild new left pleural effusion with left basilar atelectasis or edema, SpO2 82% on RA. Pt. was given Lasix 80 mg. US of BLE was negative for DVT. Pt. was placed on BiPAP and transferred to [facility] for further evaluation and treatment. Hospital course:     Acute on chronic hypoxic/hypercapnic respiratory failure 2/2 Influenza B, COPD exacerbation, and decompensated heart failure-BiPAP --> high-flow O2 --> supplemental O2, tamiflu, prednisone, duonebs, diuresis --> dialysis, pulmonology and cardiology consulted   Acute on chronic diastolic CHF-strict I&O, daily weight, 2 gm Na+ diet, c/w metoprolol, cardiology consult   Elevated D-Dimer-US negative for DVT, VQ scan showed low probability of PE  BLE edema/redness-possibly underlying cellulitis, leg elevation, local wound care, IV vancomycin  Acute on chronic anemia-required blood transfusion, iron started, CBC monitored   ANDREA on CKD-nephrology consulted, TDC placed to R chest on 3/1 by general surgery, dialysis started    Pt. was HDS and discharged back to [facility] on 3/5/24. On 3/7, patient was reported to have increased BLE edema with fluid-filled blisters and c/o pain.  Venous US was ordered, which was negative for DVT. On 3/9, patient was at dialysis and was observed to be confused and c/o dizziness. SpO2 was 66% and she was placed on 5 L of O2. Pt. refused to go to the ER, but eventually agreed to evaluation. Work-up in ER: CXR showed a right basilar opacity, Flu negative, COVID negative, RSV negative. Pt. was discharged back to SNF on Levaquin for pneumonia.     On 3/13, routine blood work revealed Hgb of 6.4. Pt. to be set-up for outpatient blood transfusion at [facility]. On 3/14, staff reported patient with low BP (77/58) and lethargy. She was sent to [facility] ER for evaluation. In the ER, /114, HR 65, RR 28, SpO2 95%. Labs were drawn, but the ER discharged patient to dialysis prior to the labs resulting. Transport reported that patient was very lethargic and drowsy on the ride to dialysis to the point where he almost took the patient back to the ER. Upon review of the ER labs, Glu was 24 and Hgb was 6.9. When patient arrived to dialysis, blood sugar was checked and was 47. She was given 1 tube of glucose and a snack with improvement noted in blood sugar to 147 as well as resolution of lethargy.     Patient is s/p blood transfusion on 3/15. Today, she denies dizziness, HA, vision changes, SOB, cough, or chest pain. Blood sugars reviewed in chart. Staff report no other clinical concerns at this time.     ROS:    As above in HPI. Otherwise, all other systems have been reviewed and are negative for complaint.    Medications reviewed and verified in NH chart.     Patient Active Problem List   Diagnosis    Hypothyroidism    Acute on chronic diastolic heart failure (Multi)    Altered mental status    Anxiety    Chronic fatigue    Constipation by delayed colonic transit    Chronic obstructive pulmonary disease (Multi)    Type 2 diabetes mellitus (Multi)    Diabetic renal disease (Multi)    Gastroesophageal reflux disease    History of renal cell carcinoma    Hypertension     Peripheral vascular disease (CMS-HCC)    Leukocytosis    Lung nodule    Major depressive disorder, single episode, unspecified    Osteoarthritis    Paroxysmal atrial fibrillation (Multi)    Primary insomnia    Stage 3b chronic kidney disease (CKD) (Multi)    Trouble walking    Hyperlipidemia, unspecified    Anemia due to chronic kidney disease, on chronic dialysis (Multi)    ANDREA (acute kidney injury) (CMS-HCC)    ASHD (arteriosclerotic heart disease)    At risk for falling    Essential tremor    Vitamin D deficiency    Acute on chronic respiratory failure with hypoxia (Multi)    Pulmonary hypertension (Multi)    Pulmonary edema ([facility])    Influenza B    History of right nephrectomy    Cognitive impairment        Past Medical History:   Diagnosis Date    Anal fissure 10/12/2023    Anemia     ASHD (arteriosclerotic heart disease)     At risk for falls     Atrial fibrillation (Multi)     CHF (congestive heart failure) (Multi)     CKD (chronic kidney disease)     COPD (chronic obstructive pulmonary disease) (Multi)     CVA (cerebral vascular accident) (Multi)     2021- right-sided weakness    Depression     Diabetes mellitus (Multi)     GERD (gastroesophageal reflux disease)     H/O pleural effusion     Hyperlipidemia     Hypertension     Hypothyroidism     Hypoxia     Impacted cerumen, left ear     Impacted cerumen of left ear    Noncompliance     Osteoarthritis     Perianal dermatitis 10/12/2023    Personal history of other diseases of the respiratory system     History of acute bronchitis    PVD (peripheral vascular disease) (CMS-HCC)     Renal carcinoma, right (Multi)     s/p partial nephrectomy 8/2021    Respiratory failure (Multi)     TIA (transient ischemic attack)     Vasculitis (CMS-HCC) 10/12/2023    Weakness        Past Surgical History:   Procedure Laterality Date    ANKLE SURGERY      2013    CARDIAC CATHETERIZATION N/A 2/7/2024    Procedure: Right Heart Cath;  Surgeon: Nicholas Antonio MD;   Location: [facility] Card[facility];  Service: Cardiovascular;  Laterality: N/A;    CATARACT EXTRACTION Right     2019     SECTION, CLASSIC      MR CHEST ANGIO W AND WO IV CONTRAST  2023    MR CHEST ANGIO W AND WO IV CONTRAST 2023 [facility] MRI    MR HEAD ANGIO WO IV CONTRAST  2021    MR HEAD ANGIO WO IV CONTRAST [facility]    NEPHRECTOMY  2021    SHOULDER SURGERY      2009       Family History   Problem Relation Name Age of Onset    Hypertension Mother      Diabetes Father      Heart disease Father      Cancer Sister      Cancer Brother      Diabetes Daughter      Asthma Daughter      Heart attack Daughter      Diabetes Maternal Grandfather      Heart disease Paternal Grandmother      Diabetes Other      Hypertension Other      COPD Other      Other (chronic lung disease) Other         Social History     Tobacco Use   Smoking Status Former    Current packs/day: 0.50    Average packs/day: 0.5 packs/day for 55.3 years (27.7 ttl pk-yrs)    Types: Cigarettes    Start date: 1969    Passive exposure: Never   Smokeless Tobacco Never       Social History     Substance and Sexual Activity   Alcohol Use Not Currently       Social History     Substance and Sexual Activity   Drug Use Yes    Types: Marijuana       Allergies   Allergen Reactions    Citalopram Other, Rash and Hives     Facial breakout, blisters, and rash    Adhesive Tape-Silicones Other     blisters    Amoxicillin Swelling    Bee Venom Protein (Honey Bee) Swelling    Codeine Headache and Other     Migraines    Latex Other     blisters    Lisinopril Swelling     Facial swelling    Prednisone Other     Yeast infection    Tuberculin Ppd Unknown    Doxycycline Rash    Oseltamivir Rash    Phenyleph-Shark Oil-Glyc-Pet Rash    Phenylephrine Nausea/vomiting        Vital Signs:   185/65-65-18-97.3-98% on RA     Physical Exam:  General: Sitting up in WC in NAD, alert   Head/Face: NCAT, symmetrical  Eyes: PERRLA, no injection, no  discharge  ENT: Hearing not impaired, ears without scars or lesions, nasal mucosa and turbinates pink, septum midline, lips pink and moist  Neck: Supple, symmetrical  Respiratory: CTA but diminished without adventitious sounds, respirations even and nonlabored without use of accessory muscles, good air exchange  Cardio: RRR without murmur or gallops, normal S1S2, + anasarca (improved), trace BLE edema (improved), pedal pulses 2+/4 bilaterally  Chest/Breast: Symmetrical, tunneled dialysis cath to R chest   GI: BS x 4, normoactive, non-distended, abd round and soft, no masses or tenderness  : No suprapubic tenderness or distention  MSK: Gait not assessed, joints with full ROM without pain or contractures  Skin: Skin warm and dry, no induration, dry skin to BLE  Neurologic: Cranial nerves II through XII intact, superficial touch and pain sensation intact  Psychiatric: Alert to person, place, and time, calm and cooperative     Results/Data:   4/11/24: K+ 4.7, Phos 4.7  3/27/24: Glu 108, BUN 23 Cr 1.59, GFR 34, CO2 34, Kel 7.9, Hgb 7.8, Hct 26.3  3/18/24: BUN 38, Cr 2.41, GFR 21, Na+ 136, K+ 3.5, Kel 7.4, Hgb 7.3, Hct 23.8, Plt 175  3/14/24: Glu 24, Hgb 6.9  3/9/24: Glu 95, K+ 3.4, CO2 33, BUN 7, Cr 0.84, GFR 75, Kel 7.6, Alb 2.6, Hgb 7.9, Hct 27.0  3/7/24: HgbA1C 7.5  3/6/24: HgbA1C 7.7, Chol 147, , HDL 65, LDL 51    Assessment/Plan:  Acute on chronic respiratory failure with hypoxia and hypercapnia 2/2 pneumonia/COPD exacerbation/acute on chronic HFpEF/right pleural effusion-c/w supplemental O2 as needed, 2 gm Na+ diet, monitor weights, 1500 ml fluid restriction, s/p thoracentesis (2/2/24) with 2.3 L of fluid removed, c/w dialysis as scheduled, s/p Levaquin (end date 3/18), duonebs prn, monitor respiratory status closely  Physical deconditioning/generalized weakness-c/w PT/OT, LCD 4/10 --> appealing for more therapy, safety and fall precautions   ANDREA on CKD-in setting of acute decompensated heart failure, avoid  nephrotoxic drugs, c/w dialysis, monitor R chest dialysis catheter site, monitor renal function, F/U with nephrologist as scheduled  AFib/CAD/HTN/HLD-s/p Watchman procedure (Aug. 2023), s/p RHC (2/27/24) which showed mild pulmonary hypertension, c/w amiodarone, aspirin, atorvastatin, metoprolol, and plavix, monitor BP and HR, F/U with cardiologist as scheduled  Hypokalemia-monitor, replete as needed  Anemia-s/p 1 unit of PRBCs on 3/15, c/w iron supplement, monitor CBC, transfuse for Hgb less than 7.0  GERD-c/w PPI  Depression-c/w venlafaxine (increased to 150 mg daily on 3/11 per psych), supportive care, Psych following  DM2-LCS diet, accuchecks, c/w humalog ISS with meals, c/w basaglar 10 units daily, HS snack, BP and lipid control, yearly eye exam, diabetic foot care, monitor HgbA1C  BLE edema/pain-elevate as tolerated, tubi- to BLE, local wound care, c/w Tylenol ES 1000 mg BID, consider gabapentin for neuropathy, monitor closely  Influenza B-RESOLVED, positive on 2/19/24, treated with Tamiflu  Hypoalbuminemia-increase protein intake, monitor albumin level, RD consult  Hypothyroidism/left thyroid nodule-c/w levothyroxine, monitor TSH and FT4, F/U with PCP after discharge for thyroid ultrasound  Dry skin of BLE-c/w ammonium lactate lotion, monitor closely     Orders:  NNO    Code Status:   Full Code

## 2024-04-23 NOTE — LETTER
Patient: Daphne Morris  : 1955    Encounter Date: 2024    Chief Complaint:   SNF F/U  Acute on chronic diastolic heart failure  Physical deconditioning/weakness  Acute hypoxic respiratory failure   Pleural effusion  ANDREA  Cardiorenal syndrome   Anemia    HPI:   68 year-old female presenting to [facility] ER on 24 with shortness of breath, difficulty ambulating, generalized weakness, and BLE edema. Work-up in ER: /63, HR 53, Temp 36.6, RR 20, SpO2 95%, EKG showed sinus bradycardia, CXR showed a new large right pleural effusion and basilar atelectasis, underlying pneumonia is not excluded, CT of chest showed large right pleural effusion with complete collapse of the RLL, atelectasis/consolidation at the RUL and RML, tree-in-bud airspace opacities at the RUL and LLL, and left thyroid gland nodule, UA + glucose and protein, Hgb 9.5, Hct 32.8, Glu 220, K+ 5.4, BUN 49, Cr 3.25, BNP 1,786. Case was discussed with cardiology and nephrology, who recommended IV diuresis. Pt. was started on IV diuretics and IV ATB and admitted to [facility] for further evaluation and treatment. Hospital course:    Acute hypoxic hypercapnic respiratory failure 2/2 CAP/COPD exacerbation/acute on chronic heart failure/large right pleural effusion-2 gm Na+ diet, I&O, daily weights, BiPAP --> supplemental O2, IV lasix, IV solu-medrol, IV Rocephin, IV Azithromycin, duonebs, cardiology consult, pulmonology consult, underwent thoracentesis on  (2300 ml of clear pleural fluid was obtained), TTE showed normal LVSF with 50-56% EF, impaired relaxation pattern of left ventricular diastolic filling, mildly reduced right ventricular systolic function, underwent RHC on  which showed mild pulmonary hypertension, diuretic changed to PO upon discharge, patient to F/U with cardiology after discharge  ANDREA-nephrology consult, renal US negative for left hydronephrosis, cardiorenal syndrome suspected 2/2 acute decompensated CHF, F/U with  nephrologist after discharge (Dr. Mitchell)  Generalized weakness-PT/OT evaluations, recommending SNF   Left knee pain-XR showed small joint effusion and degenerative changes, reticular increased density in the subcutaneous tissues which may represent lymphedema and/or cellulitis, Tylenol prn for pain   AFib/CAD/HLD-s/p Watchman in August 2023, c/w atorvastatin, amiodarone, metoprolol, ASA, plavix, monitored on telemetry   DM2-accuchecks, c/w lantus, ISS, jardiance held   GERD-PPI  Depression-c/w venlafaxine   Constipation-laxatives and stool softeners added    Pt. was HDS and discharged to [facility] on 2/15/24. On 2/18/24, patient was sent to the ER for lethargy and hypoxia. Work-up in ER: D-Dimer 13,011, BUN 58, Cr 2.88, WBC 6.7, Hgb 8.0, Trop 43, BNP 1,444, CXR showed stable pleural and parenchymal opacities in the mid and lower right hemithorax and a mild new left pleural effusion with left basilar atelectasis or edema, SpO2 82% on RA. Pt. was given Lasix 80 mg. US of BLE was negative for DVT. Pt. was placed on BiPAP and transferred to [facility] for further evaluation and treatment. Hospital course:     Acute on chronic hypoxic/hypercapnic respiratory failure 2/2 Influenza B, COPD exacerbation, and decompensated heart failure-BiPAP --> high-flow O2 --> supplemental O2, tamiflu, prednisone, duonebs, diuresis --> dialysis, pulmonology and cardiology consulted   Acute on chronic diastolic CHF-strict I&O, daily weight, 2 gm Na+ diet, c/w metoprolol, cardiology consult   Elevated D-Dimer-US negative for DVT, VQ scan showed low probability of PE  BLE edema/redness-possibly underlying cellulitis, leg elevation, local wound care, IV vancomycin  Acute on chronic anemia-required blood transfusion, iron started, CBC monitored   ANDREA on CKD-nephrology consulted, TDC placed to R chest on 3/1 by general surgery, dialysis started    Pt. was HDS and discharged back to [facility] on 3/5/24. On 3/7, patient was reported  to have increased BLE edema with fluid-filled blisters and c/o pain. Venous US was ordered, which was negative for DVT. On 3/9, patient was at dialysis and was observed to be confused and c/o dizziness. SpO2 was 66% and she was placed on 5 L of O2. Pt. refused to go to the ER, but eventually agreed to evaluation. Work-up in ER: CXR showed a right basilar opacity, Flu negative, COVID negative, RSV negative. Pt. was discharged back to SNF on Levaquin for pneumonia.     On 3/13, routine blood work revealed Hgb of 6.4. Pt. to be set-up for outpatient blood transfusion at [facility]. On 3/14, staff reported patient with low BP (77/58) and lethargy. She was sent to [facility] ER for evaluation. In the ER, /114, HR 65, RR 28, SpO2 95%. Labs were drawn, but the ER discharged patient to dialysis prior to the labs resulting. Transport reported that patient was very lethargic and drowsy on the ride to dialysis to the point where he almost took the patient back to the ER. Upon review of the ER labs, Glu was 24 and Hgb was 6.9. When patient arrived to dialysis, blood sugar was checked and was 47. She was given 1 tube of glucose and a snack with improvement noted in blood sugar to 147 as well as resolution of lethargy.     Patient is s/p blood transfusion on 3/15. Today, she denies dizziness, HA, vision changes, SOB, cough, or chest pain. Blood sugars reviewed in chart. Staff report no other clinical concerns at this time. Pt. states that she feels ready to discharge home soon.     ROS:    As above in HPI. Otherwise, all other systems have been reviewed and are negative for complaint.    Medications reviewed and verified in NH chart.     Patient Active Problem List   Diagnosis   • Hypothyroidism   • Acute on chronic diastolic heart failure (Multi)   • Altered mental status   • Anxiety   • Chronic fatigue   • Constipation by delayed colonic transit   • Chronic obstructive pulmonary disease (Multi)   • Type 2 diabetes mellitus  (Multi)   • Diabetic renal disease (Multi)   • Gastroesophageal reflux disease   • History of renal cell carcinoma   • Hypertension   • Peripheral vascular disease (CMS-HCC)   • Leukocytosis   • Lung nodule   • Major depressive disorder, single episode, unspecified   • Osteoarthritis   • Paroxysmal atrial fibrillation (Multi)   • Primary insomnia   • Stage 3b chronic kidney disease (CKD) (Multi)   • Trouble walking   • Hyperlipidemia, unspecified   • ESRD on dialysis (Multi)   • ANDREA (acute kidney injury) (CMS-HCC)   • ASHD (arteriosclerotic heart disease)   • At risk for falling   • Essential tremor   • Vitamin D deficiency   • Acute on chronic respiratory failure with hypoxia (Multi)   • Pulmonary hypertension (Multi)   • Pulmonary edema ([facility])   • Influenza B   • History of right nephrectomy   • Cognitive impairment   • Unspecified open wound, right lower leg, initial encounter   • Chronic kidney disease, stage V (Multi)   • Cellulitis of right lower extremity   • Open wound of right forearm   • Open wound of right upper arm   • Open wound of left lower leg   • Abrasion of buttock, infected   • Acute cystitis with hematuria   • Complication associated with dialysis catheter   • Cellulitis   • Iron deficiency anemia due to chronic blood loss   • Cerebrovascular accident (CVA) (Multi)        Past Medical History:   Diagnosis Date   • Anal fissure 10/12/2023   • Anemia    • ASHD (arteriosclerotic heart disease)    • At risk for falls    • Atrial fibrillation (Multi)    • CHF (congestive heart failure) (Multi)    • CKD (chronic kidney disease)    • COPD (chronic obstructive pulmonary disease) (Multi)    • CVA (cerebral vascular accident) (Multi)     2021- right-sided weakness   • Depression    • Diabetes mellitus (Multi)    • GERD (gastroesophageal reflux disease)    • H/O pleural effusion    • Hyperlipidemia    • Hypertension    • Hypothyroidism    • Hypoxia    • Impacted cerumen, left ear     Impacted cerumen of  left ear   • Noncompliance    • Osteoarthritis    • Perianal dermatitis 10/12/2023   • Personal history of other diseases of the respiratory system     History of acute bronchitis   • PVD (peripheral vascular disease) (CMS-HCC)    • Renal carcinoma, right (Multi)     s/p partial nephrectomy 2021   • Respiratory failure (Multi)    • TIA (transient ischemic attack)    • Vasculitis (CMS-HCC) 10/12/2023   • Weakness        Past Surgical History:   Procedure Laterality Date   • ANKLE SURGERY         • CARDIAC CATHETERIZATION N/A 2024    Procedure: Right Heart Cath;  Surgeon: Nicholas Antonio MD;  Location: [facility] Cardiac Cath Lab;  Service: Cardiovascular;  Laterality: N/A;   • CATARACT EXTRACTION Right     2019   •  SECTION, CLASSIC     • MR CHEST ANGIO W AND WO IV CONTRAST  2023    MR CHEST ANGIO W AND WO IV CONTRAST 2023 [facility] MRI   • MR HEAD ANGIO WO IV CONTRAST  2021    MR HEAD ANGIO WO IV CONTRAST [facility]   • NEPHRECTOMY  2021   • SHOULDER SURGERY             Family History   Problem Relation Name Age of Onset   • Hypertension Mother     • Diabetes Father     • Heart disease Father     • Cancer Sister     • Cancer Brother     • Diabetes Daughter     • Asthma Daughter     • Heart attack Daughter     • Diabetes Maternal Grandfather     • Heart disease Paternal Grandmother     • Diabetes Other     • Hypertension Other     • COPD Other     • Other (chronic lung disease) Other         Social History     Tobacco Use   Smoking Status Former   • Current packs/day: 0.50   • Average packs/day: 0.5 packs/day for 55.4 years (27.7 ttl pk-yrs)   • Types: Cigarettes   • Start date: 1969   • Passive exposure: Never   Smokeless Tobacco Never       Social History     Substance and Sexual Activity   Alcohol Use Not Currently       Social History     Substance and Sexual Activity   Drug Use Yes   • Types: Marijuana       Allergies   Allergen Reactions   • Bee Venom  Protein (Honey Bee) Swelling   • Citalopram Hives, Other, Rash and Nausea/vomiting     Facial breakout, blisters, and rash   • Codeine Headache, Other and Itching     Migraines   • Influenza Virus Vaccines Hives   • Latex Other     blisters   • Latex, Natural Rubber Other     blisters   • Lisinopril Swelling and Nausea/vomiting     Facial swelling   • Penicillins Swelling, Headache and Unknown   • Adhesive Tape-Silicones Other     blisters   • Amoxicillin Swelling and Rash   • Doxycycline Rash and Unknown   • Oseltamivir Rash and Nausea/vomiting   • Phenyleph-Min Oil-Petrolatum Other   • Phenyleph-Shark Oil-Glyc-Pet Rash   • Phenylephrine Nausea/vomiting   • Prednisone Other and Nausea/vomiting     Yeast infection   • Tuberculin Ppd Unknown   • Xylometazoline Unknown        Vital Signs:   132/79-78-18-97.6-97% on RA     Physical Exam:  General: Sitting up in WC in NAD, alert   Head/Face: NCAT, symmetrical  Eyes: PERRLA, no injection, no discharge  ENT: Hearing not impaired, ears without scars or lesions, nasal mucosa and turbinates pink, septum midline, lips pink and moist  Neck: Supple, symmetrical  Respiratory: CTA but diminished without adventitious sounds, respirations even and nonlabored without use of accessory muscles, good air exchange  Cardio: RRR without murmur or gallops, normal S1S2, + anasarca (improved), trace BLE edema (R > L), pedal pulses 2+/4 bilaterally  Chest/Breast: Symmetrical, tunneled dialysis cath to R chest   GI: BS x 4, normoactive, non-distended, abd round and soft, no masses or tenderness  : No suprapubic tenderness or distention  MSK: Gait not assessed, joints with full ROM without pain or contractures  Skin: Skin warm and dry, no induration, dry skin to BLE  Neurologic: Cranial nerves II through XII intact, superficial touch and pain sensation intact  Psychiatric: Alert to person, place, and time, calm and cooperative     Results/Data:   4/11/24: K+ 4.7, Phos 4.7  3/27/24: Glu 108,  BUN 23 Cr 1.59, GFR 34, CO2 34, Kel 7.9, Hgb 7.8, Hct 26.3  3/18/24: BUN 38, Cr 2.41, GFR 21, Na+ 136, K+ 3.5, Kel 7.4, Hgb 7.3, Hct 23.8, Plt 175  3/14/24: Glu 24, Hgb 6.9  3/9/24: Glu 95, K+ 3.4, CO2 33, BUN 7, Cr 0.84, GFR 75, Kel 7.6, Alb 2.6, Hgb 7.9, Hct 27.0  3/7/24: HgbA1C 7.5  3/6/24: HgbA1C 7.7, Chol 147, , HDL 65, LDL 51    Assessment/Plan:  Acute on chronic respiratory failure with hypoxia and hypercapnia 2/2 pneumonia/COPD exacerbation/acute on chronic HFpEF/right pleural effusion-c/w supplemental O2 as needed, 2 gm Na+ diet, monitor weights, 1500 ml fluid restriction, s/p thoracentesis (2/2/24) with 2.3 L of fluid removed, c/w dialysis as scheduled, s/p Levaquin (end date 3/18), duonebs prn, monitor respiratory status closely  Physical deconditioning/generalized weakness-c/w PT/OT, safety and fall precautions  ANDREA on CKD-in setting of acute decompensated heart failure, avoid nephrotoxic drugs, c/w dialysis, monitor R chest dialysis catheter site, monitor renal function, F/U with nephrologist as scheduled  AFib/CAD/HTN/HLD-s/p Watchman procedure (Aug. 2023), s/p RHC (2/27/24) which showed mild pulmonary hypertension, c/w amiodarone, aspirin, atorvastatin, metoprolol, and plavix, monitor BP and HR, F/U with cardiologist as scheduled  Hypokalemia-monitor, replete as needed  Anemia-s/p 1 unit of PRBCs on 3/15, c/w iron supplement, monitor CBC, transfuse for Hgb less than 7.0  GERD-c/w PPI  Depression-c/w venlafaxine (increased to 150 mg daily on 3/11 per psych), supportive care, Psych following  DM2-LCS diet, accuchecks, c/w humalog ISS with meals, c/w basaglar 10 units daily, HS snack, BP and lipid control, yearly eye exam, diabetic foot care, monitor HgbA1C  BLE edema/pain-elevate as tolerated, tubi- to BLE, local wound care, c/w Tylenol ES 1000 mg BID, consider gabapentin for neuropathy, monitor closely  Influenza B-RESOLVED, positive on 2/19/24, treated with Tamiflu  Hypoalbuminemia-increase  protein intake, monitor albumin level, RD consult  Hypothyroidism/left thyroid nodule-c/w levothyroxine, monitor TSH and FT4, F/U with PCP after discharge for thyroid ultrasound  Dry skin of BLE-c/w ammonium lactate lotion, monitor closely     Orders:  NNO    Code Status:   Full Code      Electronically Signed By: GREGORIA Olivares   5/22/24 10:50 PM

## 2024-04-23 NOTE — PROGRESS NOTES
Chief Complaint:   SNF F/U  Acute on chronic diastolic heart failure  Physical deconditioning/weakness  Acute hypoxic respiratory failure   Pleural effusion  ANDREA  Cardiorenal syndrome   Anemia    HPI:   68 year-old female presenting to [facility] ER on 2/1/24 with shortness of breath, difficulty ambulating, generalized weakness, and BLE edema. Work-up in ER: /63, HR 53, Temp 36.6, RR 20, SpO2 95%, EKG showed sinus bradycardia, CXR showed a new large right pleural effusion and basilar atelectasis, underlying pneumonia is not excluded, CT of chest showed large right pleural effusion with complete collapse of the RLL, atelectasis/consolidation at the RUL and RML, tree-in-bud airspace opacities at the RUL and LLL, and left thyroid gland nodule, UA + glucose and protein, Hgb 9.5, Hct 32.8, Glu 220, K+ 5.4, BUN 49, Cr 3.25, BNP 1,786. Case was discussed with cardiology and nephrology, who recommended IV diuresis. Pt. was started on IV diuretics and IV ATB and admitted to [facility] for further evaluation and treatment. Hospital course:    Acute hypoxic hypercapnic respiratory failure 2/2 CAP/COPD exacerbation/acute on chronic heart failure/large right pleural effusion-2 gm Na+ diet, I&O, daily weights, BiPAP --> supplemental O2, IV lasix, IV solu-medrol, IV Rocephin, IV Azithromycin, duonebs, cardiology consult, pulmonology consult, underwent thoracentesis on 2/2 (2300 ml of clear pleural fluid was obtained), TTE showed normal LVSF with 50-56% EF, impaired relaxation pattern of left ventricular diastolic filling, mildly reduced right ventricular systolic function, underwent RHC on 2/7 which showed mild pulmonary hypertension, diuretic changed to PO upon discharge, patient to F/U with cardiology after discharge  ANDREA-nephrology consult, renal US negative for left hydronephrosis, cardiorenal syndrome suspected 2/2 acute decompensated CHF, F/U with nephrologist after discharge (Dr. Mitchell)  Generalized weakness-PT/OT  evaluations, recommending SNF   Left knee pain-XR showed small joint effusion and degenerative changes, reticular increased density in the subcutaneous tissues which may represent lymphedema and/or cellulitis, Tylenol prn for pain   AFib/CAD/HLD-s/p Watchman in August 2023, c/w atorvastatin, amiodarone, metoprolol, ASA, plavix, monitored on telemetry   DM2-accuchecks, c/w lantus, ISS, jardiance held   GERD-PPI  Depression-c/w venlafaxine   Constipation-laxatives and stool softeners added    Pt. was HDS and discharged to [facility] on 2/15/24. On 2/18/24, patient was sent to the ER for lethargy and hypoxia. Work-up in ER: D-Dimer 13,011, BUN 58, Cr 2.88, WBC 6.7, Hgb 8.0, Trop 43, BNP 1,444, CXR showed stable pleural and parenchymal opacities in the mid and lower right hemithorax and a mild new left pleural effusion with left basilar atelectasis or edema, SpO2 82% on RA. Pt. was given Lasix 80 mg. US of BLE was negative for DVT. Pt. was placed on BiPAP and transferred to [facility] for further evaluation and treatment. Hospital course:     Acute on chronic hypoxic/hypercapnic respiratory failure 2/2 Influenza B, COPD exacerbation, and decompensated heart failure-BiPAP --> high-flow O2 --> supplemental O2, tamiflu, prednisone, duonebs, diuresis --> dialysis, pulmonology and cardiology consulted   Acute on chronic diastolic CHF-strict I&O, daily weight, 2 gm Na+ diet, c/w metoprolol, cardiology consult   Elevated D-Dimer-US negative for DVT, VQ scan showed low probability of PE  BLE edema/redness-possibly underlying cellulitis, leg elevation, local wound care, IV vancomycin  Acute on chronic anemia-required blood transfusion, iron started, CBC monitored   ANDREA on CKD-nephrology consulted, TDC placed to R chest on 3/1 by general surgery, dialysis started    Pt. was HDS and discharged back to [facility] on 3/5/24. On 3/7, patient was reported to have increased BLE edema with fluid-filled blisters and c/o pain.  Venous US was ordered, which was negative for DVT. On 3/9, patient was at dialysis and was observed to be confused and c/o dizziness. SpO2 was 66% and she was placed on 5 L of O2. Pt. refused to go to the ER, but eventually agreed to evaluation. Work-up in ER: CXR showed a right basilar opacity, Flu negative, COVID negative, RSV negative. Pt. was discharged back to SNF on Levaquin for pneumonia.     On 3/13, routine blood work revealed Hgb of 6.4. Pt. to be set-up for outpatient blood transfusion at [facility]. On 3/14, staff reported patient with low BP (77/58) and lethargy. She was sent to [facility] ER for evaluation. In the ER, /114, HR 65, RR 28, SpO2 95%. Labs were drawn, but the ER discharged patient to dialysis prior to the labs resulting. Transport reported that patient was very lethargic and drowsy on the ride to dialysis to the point where he almost took the patient back to the ER. Upon review of the ER labs, Glu was 24 and Hgb was 6.9. When patient arrived to dialysis, blood sugar was checked and was 47. She was given 1 tube of glucose and a snack with improvement noted in blood sugar to 147 as well as resolution of lethargy.     Patient is s/p blood transfusion on 3/15. Today, she denies dizziness, HA, vision changes, SOB, cough, or chest pain. Blood sugars reviewed in chart. Staff report no other clinical concerns at this time.     ROS:    As above in HPI. Otherwise, all other systems have been reviewed and are negative for complaint.    Medications reviewed and verified in NH chart.     Patient Active Problem List   Diagnosis    Hypothyroidism    Acute on chronic diastolic heart failure (Multi)    Altered mental status    Anxiety    Chronic fatigue    Constipation by delayed colonic transit    Chronic obstructive pulmonary disease (Multi)    Type 2 diabetes mellitus (Multi)    Diabetic renal disease (Multi)    Gastroesophageal reflux disease    History of renal cell carcinoma    Hypertension     Peripheral vascular disease (CMS-HCC)    Leukocytosis    Lung nodule    Major depressive disorder, single episode, unspecified    Osteoarthritis    Paroxysmal atrial fibrillation (Multi)    Primary insomnia    Stage 3b chronic kidney disease (CKD) (Multi)    Trouble walking    Hyperlipidemia, unspecified    Anemia due to chronic kidney disease, on chronic dialysis (Multi)    ANDREA (acute kidney injury) (CMS-HCC)    ASHD (arteriosclerotic heart disease)    At risk for falling    Essential tremor    Vitamin D deficiency    Acute on chronic respiratory failure with hypoxia (Multi)    Pulmonary hypertension (Multi)    Pulmonary edema ([facility])    Influenza B    History of right nephrectomy    Cognitive impairment        Past Medical History:   Diagnosis Date    Anal fissure 10/12/2023    Anemia     ASHD (arteriosclerotic heart disease)     At risk for falls     Atrial fibrillation (Multi)     CHF (congestive heart failure) (Multi)     CKD (chronic kidney disease)     COPD (chronic obstructive pulmonary disease) (Multi)     CVA (cerebral vascular accident) (Multi)     2021- right-sided weakness    Depression     Diabetes mellitus (Multi)     GERD (gastroesophageal reflux disease)     H/O pleural effusion     Hyperlipidemia     Hypertension     Hypothyroidism     Hypoxia     Impacted cerumen, left ear     Impacted cerumen of left ear    Noncompliance     Osteoarthritis     Perianal dermatitis 10/12/2023    Personal history of other diseases of the respiratory system     History of acute bronchitis    PVD (peripheral vascular disease) (CMS-HCC)     Renal carcinoma, right (Multi)     s/p partial nephrectomy 8/2021    Respiratory failure (Multi)     TIA (transient ischemic attack)     Vasculitis (CMS-HCC) 10/12/2023    Weakness        Past Surgical History:   Procedure Laterality Date    ANKLE SURGERY      2013    CARDIAC CATHETERIZATION N/A 2/7/2024    Procedure: Right Heart Cath;  Surgeon: Nicholas Antonio MD;   Location: [facility] Cardiac Cath Lab;  Service: Cardiovascular;  Laterality: N/A;    CATARACT EXTRACTION Right     2019     SECTION, CLASSIC      MR CHEST ANGIO W AND WO IV CONTRAST  2023    MR CHEST ANGIO W AND WO IV CONTRAST 2023 [facility] MRI    MR HEAD ANGIO WO IV CONTRAST  2021    MR HEAD ANGIO WO IV CONTRAST [facility]    NEPHRECTOMY  2021    SHOULDER SURGERY      2009       Family History   Problem Relation Name Age of Onset    Hypertension Mother      Diabetes Father      Heart disease Father      Cancer Sister      Cancer Brother      Diabetes Daughter      Asthma Daughter      Heart attack Daughter      Diabetes Maternal Grandfather      Heart disease Paternal Grandmother      Diabetes Other      Hypertension Other      COPD Other      Other (chronic lung disease) Other         Social History     Tobacco Use   Smoking Status Former    Current packs/day: 0.50    Average packs/day: 0.5 packs/day for 55.3 years (27.7 ttl pk-yrs)    Types: Cigarettes    Start date: 1969    Passive exposure: Never   Smokeless Tobacco Never       Social History     Substance and Sexual Activity   Alcohol Use Not Currently       Social History     Substance and Sexual Activity   Drug Use Yes    Types: Marijuana       Allergies   Allergen Reactions    Citalopram Other, Rash and Hives     Facial breakout, blisters, and rash    Adhesive Tape-Silicones Other     blisters    Amoxicillin Swelling    Bee Venom Protein (Honey Bee) Swelling    Codeine Headache and Other     Migraines    Latex Other     blisters    Lisinopril Swelling     Facial swelling    Prednisone Other     Yeast infection    Tuberculin Ppd Unknown    Doxycycline Rash    Oseltamivir Rash    Phenyleph-Shark Oil-Glyc-Pet Rash    Phenylephrine Nausea/vomiting        Vital Signs:   112/55-67-18-97.3-95% on RA     Physical Exam:  General: Sitting up in WC in NAD, alert   Head/Face: NCAT, symmetrical  Eyes: PERRLA, no injection, no  discharge  ENT: Hearing not impaired, ears without scars or lesions, nasal mucosa and turbinates pink, septum midline, lips pink and moist  Neck: Supple, symmetrical  Respiratory: CTA but diminished without adventitious sounds, respirations even and nonlabored without use of accessory muscles, good air exchange  Cardio: RRR without murmur or gallops, normal S1S2, + anasarca (improved), BLE NP edema (improved), pedal pulses 2+/4 bilaterally  Chest/Breast: Symmetrical, tunneled dialysis cath to R chest   GI: BS x 4, normoactive, non-distended, abd round and soft, no masses or tenderness  : No suprapubic tenderness or distention  MSK: Gait not assessed, joints with full ROM without pain or contractures  Skin: Skin warm and dry, no induration, tubi- intact to BLE  Neurologic: Cranial nerves II through XII intact, superficial touch and pain sensation intact  Psychiatric: Alert to person, place, and time, calm and cooperative     Results/Data:   3/27/24: Glu 108, BUN 23 Cr 1.59, GFR 34, CO2 34, Kel 7.9, Hgb 7.8, Hct 26.3  3/18/24: BUN 38, Cr 2.41, GFR 21, Na+ 136, K+ 3.5, Kel 7.4, Hgb 7.3, Hct 23.8, Plt 175  3/14/24: Glu 24, Hgb 6.9  3/9/24: Glu 95, K+ 3.4, CO2 33, BUN 7, Cr 0.84, GFR 75, Kel 7.6, Alb 2.6, Hgb 7.9, Hct 27.0  3/6/24: HgbA1C 7.7, Chol 147, , HDL 65, LDL 51    Assessment/Plan:  Acute on chronic respiratory failure with hypoxia and hypercapnia 2/2 pneumonia/COPD exacerbation/acute on chronic HFpEF/right pleural effusion-c/w supplemental O2 as needed, 2 gm Na+ diet, monitor weights, 1500 ml fluid restriction, s/p thoracentesis (2/2/24) with 2.3 L of fluid removed, c/w dialysis as scheduled, s/p Levaquin (end date 3/18), duonebs prn, monitor respiratory status closely  Physical deconditioning/generalized weakness-c/w PT/OT, safety and fall precautions   ANDREA on CKD-in setting of acute decompensated heart failure, avoid nephrotoxic drugs, c/w dialysis, monitor R chest dialysis catheter site, monitor  renal function, F/U with nephrologist as scheduled  AFib/CAD/HTN/HLD-s/p Watchman procedure (Aug. 2023), s/p RHC (2/27/24) which showed mild pulmonary hypertension, c/w amiodarone, aspirin, atorvastatin, metoprolol, and plavix, monitor BP and HR, F/U with cardiologist as scheduled  Hypokalemia-monitor, replete as needed  Anemia-s/p 1 unit of PRBCs on 3/15, c/w iron supplement, monitor CBC, transfuse for Hgb less than 7.0  GERD-c/w PPI  Depression-c/w venlafaxine (increased to 150 mg daily on 3/11 per psych), supportive care, Psych following  DM2-LCS diet, accuchecks, c/w humalog ISS with meals, c/w basaglar 10 units daily, HS snack, BP and lipid control, yearly eye exam, diabetic foot care, monitor HgbA1C  BLE edema/pain-elevate as tolerated, tubi- to BLE, local wound care, c/w Tylenol ES 1000 mg BID, consider gabapentin for neuropathy, monitor closely  Influenza B-RESOLVED, positive on 2/19/24, treated with Tamiflu  Hypoalbuminemia-increase protein intake, monitor albumin level, RD consult  Hypothyroidism/left thyroid nodule-c/w levothyroxine, monitor TSH and FT4, F/U with PCP after discharge for thyroid ultrasound    Orders:  NNO    Code Status:   Full Code

## 2024-04-23 NOTE — PROGRESS NOTES
Chief Complaint:   SNF F/U  Acute on chronic diastolic heart failure  Physical deconditioning/weakness  Acute hypoxic respiratory failure   Pleural effusion  ANDREA  Cardiorenal syndrome   Anemia    HPI:   68 year-old female presenting to [facility] ER on 2/1/24 with shortness of breath, difficulty ambulating, generalized weakness, and BLE edema. Work-up in ER: /63, HR 53, Temp 36.6, RR 20, SpO2 95%, EKG showed sinus bradycardia, CXR showed a new large right pleural effusion and basilar atelectasis, underlying pneumonia is not excluded, CT of chest showed large right pleural effusion with complete collapse of the RLL, atelectasis/consolidation at the RUL and RML, tree-in-bud airspace opacities at the RUL and LLL, and left thyroid gland nodule, UA + glucose and protein, Hgb 9.5, Hct 32.8, Glu 220, K+ 5.4, BUN 49, Cr 3.25, BNP 1,786. Case was discussed with cardiology and nephrology, who recommended IV diuresis. Pt. was started on IV diuretics and IV ATB and admitted to [facility] for further evaluation and treatment. Hospital course:    Acute hypoxic hypercapnic respiratory failure 2/2 CAP/COPD exacerbation/acute on chronic heart failure/large right pleural effusion-2 gm Na+ diet, I&O, daily weights, BiPAP --> supplemental O2, IV lasix, IV solu-medrol, IV Rocephin, IV Azithromycin, duonebs, cardiology consult, pulmonology consult, underwent thoracentesis on 2/2 (2300 ml of clear pleural fluid was obtained), TTE showed normal LVSF with 50-56% EF, impaired relaxation pattern of left ventricular diastolic filling, mildly reduced right ventricular systolic function, underwent RHC on 2/7 which showed mild pulmonary hypertension, diuretic changed to PO upon discharge, patient to F/U with cardiology after discharge  ANDREA-nephrology consult, renal US negative for left hydronephrosis, cardiorenal syndrome suspected 2/2 acute decompensated CHF, F/U with nephrologist after discharge (Dr. Mitchell)  Generalized weakness-PT/OT  evaluations, recommending SNF   Left knee pain-XR showed small joint effusion and degenerative changes, reticular increased density in the subcutaneous tissues which may represent lymphedema and/or cellulitis, Tylenol prn for pain   AFib/CAD/HLD-s/p Watchman in August 2023, c/w atorvastatin, amiodarone, metoprolol, ASA, plavix, monitored on telemetry   DM2-accuchecks, c/w lantus, ISS, jardiance held   GERD-PPI  Depression-c/w venlafaxine   Constipation-laxatives and stool softeners added    Pt. was HDS and discharged to [facility] on 2/15/24. On 2/18/24, patient was sent to the ER for lethargy and hypoxia. Work-up in ER: D-Dimer 13,011, BUN 58, Cr 2.88, WBC 6.7, Hgb 8.0, Trop 43, BNP 1,444, CXR showed stable pleural and parenchymal opacities in the mid and lower right hemithorax and a mild new left pleural effusion with left basilar atelectasis or edema, SpO2 82% on RA. Pt. was given Lasix 80 mg. US of BLE was negative for DVT. Pt. was placed on BiPAP and transferred to [facility] for further evaluation and treatment. Hospital course:     Acute on chronic hypoxic/hypercapnic respiratory failure 2/2 Influenza B, COPD exacerbation, and decompensated heart failure-BiPAP --> high-flow O2 --> supplemental O2, tamiflu, prednisone, duonebs, diuresis --> dialysis, pulmonology and cardiology consulted   Acute on chronic diastolic CHF-strict I&O, daily weight, 2 gm Na+ diet, c/w metoprolol, cardiology consult   Elevated D-Dimer-US negative for DVT, VQ scan showed low probability of PE  BLE edema/redness-possibly underlying cellulitis, leg elevation, local wound care, IV vancomycin  Acute on chronic anemia-required blood transfusion, iron started, CBC monitored   ANDREA on CKD-nephrology consulted, TDC placed to R chest on 3/1 by general surgery, dialysis started    Pt. was HDS and discharged back to [facility] on 3/5/24. On 3/7, patient was reported to have increased BLE edema with fluid-filled blisters and c/o pain.  Venous US was ordered, which was negative for DVT. On 3/9, patient was at dialysis and was observed to be confused and c/o dizziness. SpO2 was 66% and she was placed on 5 L of O2. Pt. refused to go to the ER, but eventually agreed to evaluation. Work-up in ER: CXR showed a right basilar opacity, Flu negative, COVID negative, RSV negative. Pt. was discharged back to SNF on Levaquin for pneumonia.     On 3/13, routine blood work revealed Hgb of 6.4. Pt. to be set-up for outpatient blood transfusion at [facility]. On 3/14, staff reported patient with low BP (77/58) and lethargy. She was sent to [facility] ER for evaluation. In the ER, /114, HR 65, RR 28, SpO2 95%. Labs were drawn, but the ER discharged patient to dialysis prior to the labs resulting. Transport reported that patient was very lethargic and drowsy on the ride to dialysis to the point where he almost took the patient back to the ER. Upon review of the ER labs, Glu was 24 and Hgb was 6.9. When patient arrived to dialysis, blood sugar was checked and was 47. She was given 1 tube of glucose and a snack with improvement noted in blood sugar to 147 as well as resolution of lethargy.     Patient is s/p blood transfusion on 3/15. Today, she denies dizziness, HA, vision changes, SOB, cough, or chest pain. Blood sugars reviewed in chart. Staff report no other clinical concerns at this time.     ROS:    As above in HPI. Otherwise, all other systems have been reviewed and are negative for complaint.    Medications reviewed and verified in NH chart.     Patient Active Problem List   Diagnosis    Hypothyroidism    Acute on chronic diastolic heart failure (Multi)    Altered mental status    Anxiety    Chronic fatigue    Constipation by delayed colonic transit    Chronic obstructive pulmonary disease (Multi)    Type 2 diabetes mellitus (Multi)    Diabetic renal disease (Multi)    Gastroesophageal reflux disease    History of renal cell carcinoma    Hypertension     Peripheral vascular disease (CMS-HCC)    Leukocytosis    Lung nodule    Major depressive disorder, single episode, unspecified    Osteoarthritis    Paroxysmal atrial fibrillation (Multi)    Primary insomnia    Stage 3b chronic kidney disease (CKD) (Multi)    Trouble walking    Hyperlipidemia, unspecified    Anemia due to chronic kidney disease, on chronic dialysis (Multi)    ANDREA (acute kidney injury) (CMS-HCC)    ASHD (arteriosclerotic heart disease)    At risk for falling    Essential tremor    Vitamin D deficiency    Acute on chronic respiratory failure with hypoxia (Multi)    Pulmonary hypertension (Multi)    Pulmonary edema ([facility])    Influenza B    History of right nephrectomy    Cognitive impairment        Past Medical History:   Diagnosis Date    Anal fissure 10/12/2023    Anemia     ASHD (arteriosclerotic heart disease)     At risk for falls     Atrial fibrillation (Multi)     CHF (congestive heart failure) (Multi)     CKD (chronic kidney disease)     COPD (chronic obstructive pulmonary disease) (Multi)     CVA (cerebral vascular accident) (Multi)     2021- right-sided weakness    Depression     Diabetes mellitus (Multi)     GERD (gastroesophageal reflux disease)     H/O pleural effusion     Hyperlipidemia     Hypertension     Hypothyroidism     Hypoxia     Impacted cerumen, left ear     Impacted cerumen of left ear    Noncompliance     Osteoarthritis     Perianal dermatitis 10/12/2023    Personal history of other diseases of the respiratory system     History of acute bronchitis    PVD (peripheral vascular disease) (CMS-HCC)     Renal carcinoma, right (Multi)     s/p partial nephrectomy 8/2021    Respiratory failure (Multi)     TIA (transient ischemic attack)     Vasculitis (CMS-HCC) 10/12/2023    Weakness        Past Surgical History:   Procedure Laterality Date    ANKLE SURGERY      2013    CARDIAC CATHETERIZATION N/A 2/7/2024    Procedure: Right Heart Cath;  Surgeon: Nicholas Antonio MD;   Location: [facility] Cardiac Cath Lab;  Service: Cardiovascular;  Laterality: N/A;    CATARACT EXTRACTION Right     2019     SECTION, CLASSIC      MR CHEST ANGIO W AND WO IV CONTRAST  2023    MR CHEST ANGIO W AND WO IV CONTRAST 2023 [facility] MRI    MR HEAD ANGIO WO IV CONTRAST  2021    MR HEAD ANGIO WO IV CONTRAST [facility]    NEPHRECTOMY  2021    SHOULDER SURGERY      2009       Family History   Problem Relation Name Age of Onset    Hypertension Mother      Diabetes Father      Heart disease Father      Cancer Sister      Cancer Brother      Diabetes Daughter      Asthma Daughter      Heart attack Daughter      Diabetes Maternal Grandfather      Heart disease Paternal Grandmother      Diabetes Other      Hypertension Other      COPD Other      Other (chronic lung disease) Other         Social History     Tobacco Use   Smoking Status Former    Current packs/day: 0.50    Average packs/day: 0.5 packs/day for 55.3 years (27.7 ttl pk-yrs)    Types: Cigarettes    Start date: 1969    Passive exposure: Never   Smokeless Tobacco Never       Social History     Substance and Sexual Activity   Alcohol Use Not Currently       Social History     Substance and Sexual Activity   Drug Use Yes    Types: Marijuana       Allergies   Allergen Reactions    Citalopram Other, Rash and Hives     Facial breakout, blisters, and rash    Adhesive Tape-Silicones Other     blisters    Amoxicillin Swelling    Bee Venom Protein (Honey Bee) Swelling    Codeine Headache and Other     Migraines    Latex Other     blisters    Lisinopril Swelling     Facial swelling    Prednisone Other     Yeast infection    Tuberculin Ppd Unknown    Doxycycline Rash    Oseltamivir Rash    Phenyleph-Shark Oil-Glyc-Pet Rash    Phenylephrine Nausea/vomiting        Vital Signs:   128/80-70-18-97.2-95% on RA     Physical Exam:  General: Sitting up in WC in NAD, alert   Head/Face: NCAT, symmetrical  Eyes: PERRLA, no injection, no  discharge  ENT: Hearing not impaired, ears without scars or lesions, nasal mucosa and turbinates pink, septum midline, lips pink and moist  Neck: Supple, symmetrical  Respiratory: CTA but diminished without adventitious sounds, respirations even and nonlabored without use of accessory muscles, good air exchange  Cardio: RRR without murmur or gallops, normal S1S2, + anasarca (improved), BLE NP edema (improved), pedal pulses 2+/4 bilaterally  Chest/Breast: Symmetrical, tunneled dialysis cath to R chest   GI: BS x 4, normoactive, non-distended, abd round and soft, no masses or tenderness  : No suprapubic tenderness or distention  MSK: Gait not assessed, joints with full ROM without pain or contractures  Skin: Skin warm and dry, no induration, tubi- intact to BLE  Neurologic: Cranial nerves II through XII intact, superficial touch and pain sensation intact  Psychiatric: Alert to person, place, and time, calm and cooperative     Results/Data:   3/27/24: Glu 108, BUN 23 Cr 1.59, GFR 34, CO2 34, Kel 7.9, Hgb 7.8, Hct 26.3  3/18/24: BUN 38, Cr 2.41, GFR 21, Na+ 136, K+ 3.5, Kel 7.4, Hgb 7.3, Hct 23.8, Plt 175  3/14/24: Glu 24, Hgb 6.9  3/9/24: Glu 95, K+ 3.4, CO2 33, BUN 7, Cr 0.84, GFR 75, Kel 7.6, Alb 2.6, Hgb 7.9, Hct 27.0  3/6/24: HgbA1C 7.7, Chol 147, , HDL 65, LDL 51    Assessment/Plan:  Acute on chronic respiratory failure with hypoxia and hypercapnia 2/2 pneumonia/COPD exacerbation/acute on chronic HFpEF/right pleural effusion-c/w supplemental O2 as needed, 2 gm Na+ diet, monitor weights, 1500 ml fluid restriction, s/p thoracentesis (2/2/24) with 2.3 L of fluid removed, c/w dialysis as scheduled, s/p Levaquin (end date 3/18), duonebs prn, monitor respiratory status closely  Physical deconditioning/generalized weakness-c/w PT/OT, LCD 4/10 --> appealing for more therapy, safety and fall precautions   ANDREA on CKD-in setting of acute decompensated heart failure, avoid nephrotoxic drugs, c/w dialysis, monitor  R chest dialysis catheter site, monitor renal function, F/U with nephrologist as scheduled  AFib/CAD/HTN/HLD-s/p Watchman procedure (Aug. 2023), s/p RHC (2/27/24) which showed mild pulmonary hypertension, c/w amiodarone, aspirin, atorvastatin, metoprolol, and plavix, monitor BP and HR, F/U with cardiologist as scheduled  Hypokalemia-monitor, replete as needed  Anemia-s/p 1 unit of PRBCs on 3/15, c/w iron supplement, monitor CBC, transfuse for Hgb less than 7.0  GERD-c/w PPI  Depression-c/w venlafaxine (increased to 150 mg daily on 3/11 per psych), supportive care, Psych following  DM2-LCS diet, accuchecks, c/w humalog ISS with meals, c/w basaglar 10 units daily, HS snack, BP and lipid control, yearly eye exam, diabetic foot care, monitor HgbA1C  BLE edema/pain-elevate as tolerated, tubi- to BLE, local wound care, c/w Tylenol ES 1000 mg BID, consider gabapentin for neuropathy, monitor closely  Influenza B-RESOLVED, positive on 2/19/24, treated with Tamiflu  Hypoalbuminemia-increase protein intake, monitor albumin level, RD consult  Hypothyroidism/left thyroid nodule-c/w levothyroxine, monitor TSH and FT4, F/U with PCP after discharge for thyroid ultrasound    Orders:  NNO    Code Status:   Full Code

## 2024-04-24 ENCOUNTER — DOCUMENTATION (OUTPATIENT)
Dept: HOME HEALTH SERVICES | Facility: HOME HEALTH | Age: 69
End: 2024-04-24
Payer: MEDICARE

## 2024-04-24 ENCOUNTER — HOME HEALTH ADMISSION (OUTPATIENT)
Dept: HOME HEALTH SERVICES | Facility: HOME HEALTH | Age: 69
End: 2024-04-24
Payer: MEDICARE

## 2024-04-24 ENCOUNTER — TELEPHONE (OUTPATIENT)
Dept: PRIMARY CARE | Facility: CLINIC | Age: 69
End: 2024-04-24
Payer: MEDICARE

## 2024-04-24 NOTE — TELEPHONE ENCOUNTER
Briana/ discharge planner at [facility] phoned to notify [facility] that patient will be discharging from their facility to home on 4/25/24. Briana reported she will be placing a referral to [facility]. [facility] visit scheduled with Poly Diaz NP 5/3/24 at 1:00 pm. Requested SNF discharge paperwork including a current medication list and Briana reported she will fax paperwork once completed to the [facility] office.

## 2024-04-24 NOTE — HH CARE COORDINATION
Home Care received a Referral for Nursing, Physical Therapy, and Occupational Therapy. We have processed the referral for a Start of Care on 24-48 HOURS POST DC .     If you have any questions or concerns, please feel free to contact us at [phone]. Follow the prompts, enter your five digit zip code, and you will be directed to your care team on EAST 2.

## 2024-04-25 ENCOUNTER — TELEPHONE (OUTPATIENT)
Dept: PRIMARY CARE | Facility: CLINIC | Age: 69
End: 2024-04-25
Payer: MEDICARE

## 2024-04-25 RX ORDER — METOPROLOL SUCCINATE 50 MG/1
50 TABLET, EXTENDED RELEASE ORAL DAILY
COMMUNITY
End: 2024-05-02 | Stop reason: HOSPADM

## 2024-04-25 RX ORDER — ACETAMINOPHEN 500 MG
1000 TABLET ORAL 2 TIMES DAILY
COMMUNITY

## 2024-04-27 ENCOUNTER — HOSPITAL ENCOUNTER (EMERGENCY)
Facility: HOSPITAL | Age: 69
Discharge: HOME | End: 2024-04-27
Attending: STUDENT IN AN ORGANIZED HEALTH CARE EDUCATION/TRAINING PROGRAM
Payer: MEDICARE

## 2024-04-27 ENCOUNTER — APPOINTMENT (OUTPATIENT)
Dept: CARDIOLOGY | Facility: HOSPITAL | Age: 69
End: 2024-04-27
Payer: MEDICARE

## 2024-04-27 ENCOUNTER — APPOINTMENT (OUTPATIENT)
Dept: RADIOLOGY | Facility: HOSPITAL | Age: 69
End: 2024-04-27
Payer: MEDICARE

## 2024-04-27 VITALS
DIASTOLIC BLOOD PRESSURE: 71 MMHG | HEIGHT: 66 IN | TEMPERATURE: 97.5 F | SYSTOLIC BLOOD PRESSURE: 162 MMHG | BODY MASS INDEX: 30.47 KG/M2 | RESPIRATION RATE: 18 BRPM | OXYGEN SATURATION: 99 % | WEIGHT: 189.6 LBS | HEART RATE: 64 BPM
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
## 2024-04-27 LAB
ANION GAP SERPL CALC-SCNC: 10 MMOL/L
BASOPHILS # BLD AUTO: 0.04 X10*3/UL (ref 0–0.1)
BASOPHILS NFR BLD AUTO: 0.4 %
BUN SERPL-MCNC: 33 MG/DL (ref 8–25)
CALCIUM SERPL-MCNC: 8.4 MG/DL (ref 8.5–10.4)
CHLORIDE SERPL-SCNC: 102 MMOL/L (ref 97–107)
CO2 SERPL-SCNC: 29 MMOL/L (ref 24–31)
CREAT SERPL-MCNC: 2.5 MG/DL (ref 0.4–1.6)
EGFRCR SERPLBLD CKD-EPI 2021: 20 ML/MIN/1.73M*2
EOSINOPHIL # BLD AUTO: 0.32 X10*3/UL (ref 0–0.7)
EOSINOPHIL NFR BLD AUTO: 3.4 %
ERYTHROCYTE [DISTWIDTH] IN BLOOD BY AUTOMATED COUNT: 17 % (ref 11.5–14.5)
GLUCOSE SERPL-MCNC: 164 MG/DL (ref 65–99)
HCT VFR BLD AUTO: 37.6 % (ref 36–46)
HGB BLD-MCNC: 10.6 G/DL (ref 12–16)
IMM GRANULOCYTES # BLD AUTO: 0.03 X10*3/UL (ref 0–0.7)
IMM GRANULOCYTES NFR BLD AUTO: 0.3 % (ref 0–0.9)
LYMPHOCYTES # BLD AUTO: 1.51 X10*3/UL (ref 1.2–4.8)
LYMPHOCYTES NFR BLD AUTO: 16.1 %
MCH RBC QN AUTO: 29.1 PG (ref 26–34)
MCHC RBC AUTO-ENTMCNC: 28.2 G/DL (ref 32–36)
MCV RBC AUTO: 103 FL (ref 80–100)
MONOCYTES # BLD AUTO: 0.81 X10*3/UL (ref 0.1–1)
MONOCYTES NFR BLD AUTO: 8.7 %
NEUTROPHILS # BLD AUTO: 6.64 X10*3/UL (ref 1.2–7.7)
NEUTROPHILS NFR BLD AUTO: 71.1 %
NRBC BLD-RTO: 0 /100 WBCS (ref 0–0)
PLATELET # BLD AUTO: 233 X10*3/UL (ref 150–450)
POTASSIUM SERPL-SCNC: 4.1 MMOL/L (ref 3.4–5.1)
RBC # BLD AUTO: 3.64 X10*6/UL (ref 4–5.2)
SODIUM SERPL-SCNC: 141 MMOL/L (ref 133–145)
WBC # BLD AUTO: 9.4 X10*3/UL (ref 4.4–11.3)

## 2024-04-27 PROCEDURE — 99284 EMERGENCY DEPT VISIT MOD MDM: CPT | Mod: 25

## 2024-04-27 PROCEDURE — 71045 X-RAY EXAM CHEST 1 VIEW: CPT | Performed by: RADIOLOGY

## 2024-04-27 PROCEDURE — 93005 ELECTROCARDIOGRAM TRACING: CPT

## 2024-04-27 PROCEDURE — 73080 X-RAY EXAM OF ELBOW: CPT | Mod: RIGHT SIDE | Performed by: RADIOLOGY

## 2024-04-27 PROCEDURE — 80048 BASIC METABOLIC PNL TOTAL CA: CPT | Performed by: STUDENT IN AN ORGANIZED HEALTH CARE EDUCATION/TRAINING PROGRAM

## 2024-04-27 PROCEDURE — 85025 COMPLETE CBC W/AUTO DIFF WBC: CPT | Performed by: STUDENT IN AN ORGANIZED HEALTH CARE EDUCATION/TRAINING PROGRAM

## 2024-04-27 PROCEDURE — 71045 X-RAY EXAM CHEST 1 VIEW: CPT

## 2024-04-27 PROCEDURE — 73080 X-RAY EXAM OF ELBOW: CPT | Mod: RT

## 2024-04-27 RX ORDER — TRAZODONE HYDROCHLORIDE 50 MG/1
TABLET ORAL EVERY 24 HOURS
COMMUNITY
End: 2024-05-02 | Stop reason: HOSPADM

## 2024-04-27 RX ORDER — ACETAMINOPHEN 500 MG
5 TABLET ORAL DAILY PRN
COMMUNITY
Start: 2024-02-08 | End: 2024-05-02 | Stop reason: HOSPADM

## 2024-04-27 RX ORDER — EAR PLUGS
1 EACH OTIC (EAR) 2 TIMES DAILY PRN
COMMUNITY
Start: 2024-02-14 | End: 2024-05-02 | Stop reason: HOSPADM

## 2024-04-27 RX ORDER — BUPROPION HYDROCHLORIDE 300 MG/1
TABLET ORAL EVERY 24 HOURS
COMMUNITY
End: 2024-05-02 | Stop reason: HOSPADM

## 2024-04-27 ASSESSMENT — PAIN - FUNCTIONAL ASSESSMENT: PAIN_FUNCTIONAL_ASSESSMENT: 0-10

## 2024-04-27 ASSESSMENT — PAIN SCALES - GENERAL: PAINLEVEL_OUTOF10: 8

## 2024-04-27 NOTE — ED PROVIDER NOTES
HPI   Chief Complaint   Patient presents with    Vascular Access Problem     Pt states last night she lost her balance and fell.  States her dialysis port got caught and pulled out.  Also has skin tears on her right arm and right leg. Denies head neck or back injury.  Denies loc.  States she takes a blood thinner.       HPI  See MDM                  Adel Coma Scale Score: 15                     Patient History   Past Medical History:   Diagnosis Date    Anal fissure 10/12/2023    Anemia     ASHD (arteriosclerotic heart disease)     At risk for falls     Atrial fibrillation (Multi)     CHF (congestive heart failure) (Multi)     CKD (chronic kidney disease)     COPD (chronic obstructive pulmonary disease) (Multi)     CVA (cerebral vascular accident) (Multi)     - right-sided weakness    Depression     Diabetes mellitus (Multi)     GERD (gastroesophageal reflux disease)     H/O pleural effusion     Hyperlipidemia     Hypertension     Hypothyroidism     Hypoxia     Impacted cerumen, left ear     Impacted cerumen of left ear    Noncompliance     Osteoarthritis     Perianal dermatitis 10/12/2023    Personal history of other diseases of the respiratory system     History of acute bronchitis    PVD (peripheral vascular disease) (CMS-HCC)     Renal carcinoma, right (Multi)     s/p partial nephrectomy 2021    Respiratory failure (Multi)     TIA (transient ischemic attack)     Vasculitis (CMS-HCC) 10/12/2023    Weakness      Past Surgical History:   Procedure Laterality Date    ANKLE SURGERY          CARDIAC CATHETERIZATION N/A 2024    Procedure: Right Heart Cath;  Surgeon: Nicholas Antonio MD;  Location: [facility] Cardiac Cath Lab;  Service: Cardiovascular;  Laterality: N/A;    CATARACT EXTRACTION Right     2019     SECTION, CLASSIC      MR CHEST ANGIO W AND WO IV CONTRAST  2023    MR CHEST ANGIO W AND WO IV CONTRAST 2023 [facility] MRI    MR HEAD ANGIO WO IV CONTRAST  2021    MR  HEAD ANGIO WO IV CONTRAST [facility]    NEPHRECTOMY  08/04/2021    SHOULDER SURGERY      2009     Family History   Problem Relation Name Age of Onset    Hypertension Mother      Diabetes Father      Heart disease Father      Cancer Sister      Cancer Brother      Diabetes Daughter      Asthma Daughter      Heart attack Daughter      Diabetes Maternal Grandfather      Heart disease Paternal Grandmother      Diabetes Other      Hypertension Other      COPD Other      Other (chronic lung disease) Other       Social History     Tobacco Use    Smoking status: Former     Current packs/day: 0.50     Average packs/day: 0.5 packs/day for 55.3 years (27.7 ttl pk-yrs)     Types: Cigarettes     Start date: 1/1/1969     Passive exposure: Never    Smokeless tobacco: Never   Vaping Use    Vaping status: Never Used   Substance Use Topics    Alcohol use: Not Currently    Drug use: Yes     Types: Marijuana       Physical Exam   ED Triage Vitals [04/27/24 0805]   Temperature Heart Rate Respirations BP   36.4 °C (97.5 °F) 65 18 161/77      Pulse Ox Temp src Heart Rate Source Patient Position   (!) 92 % -- -- --      BP Location FiO2 (%)     -- --       Physical Exam  See [facility]  ED Course & [facility]   ED Course as of 04/27/24 0937   Sat Apr 27, 2024   0855 EKG presented to me at 8:55 AM     EKG as interpreted by me shows normal sinus rhythm at a ventricular rate of 64 bpm, left axis deviation, no STEMI.   [DH]      ED Course User Index  [DH] Silver Cole MD         Diagnoses as of 04/27/24 0937   Fall, initial encounter       Medical Decision Making  68-year-old female past medical history of COPD, hypothyroidism, diabetes, hypertension, ESRD on dialysis who presents to the emergency room after fall.  Patient fell on Friday morning as she was petting her cat and lost her balance and fell onto her right side.  Patient has otherwise been ambulating normally and during this fall her dialysis catheter in her right chest was pulled out.   Patient presented to dialysis this morning and they instructed her to present to the emergency room.  Patient otherwise denies any head strike or loss of consciousness and otherwise has been ambulating without any issues.  Patient otherwise denies any significant chest pain or shortness of breath at this time.    ED Triage Vitals [04/27/24 0805]   Temperature Heart Rate Respirations BP   36.4 °C (97.5 °F) 65 18 161/77      Pulse Ox Temp src Heart Rate Source Patient Position   (!) 92 % -- -- --      BP Location FiO2 (%)     -- --       Vital signs reviewed: Afebrile, not tachycardic, normotensive, 92% on room air    Exam     Constitutional: No acute distress. Resting comfortably.   Head: Normocephalic, atraumatic.   Eyes: Pupils equal bilaterally, EOM grossly intact, conjunctiva normal.  Mouth/Throat: Oropharynx is clear, moist mucus membranes.   Neck: Supple. No lymphadenopathy.  Cardiovascular: Regular rate and regular rhythm. Extremities are well-perfused.   Pulmonary/Chest: No respiratory distress, breathing comfortably on room air.    Abdominal: Soft, non-tender, non-distended. No rebound or guarding.   Musculoskeletal: Mild lateral lower extremity edema.  Full range of motion of bilateral knees as well as right elbow, no tenderness to palpation over bilateral hips.      Skin: Warm, dry, and intact.  Skin tears over the bilateral shins, right forearm and right elbow.  Neurological: Patient is oriented to person, place, time, and situation. Face symmetric, hearing intact to voice, speech normal. Moves all extremities.     Differential includes but is not limited to:  Skin tears versus elbow fracture versus need for dialysis      Labs: ordered.  Labs Reviewed   CBC WITH AUTO DIFFERENTIAL - Abnormal       Result Value    WBC 9.4      nRBC 0.0      RBC 3.64 (*)     Hemoglobin 10.6 (*)     Hematocrit 37.6       (*)     MCH 29.1      MCHC 28.2 (*)     RDW 17.0 (*)     Platelets 233      Neutrophils % 71.1       Immature Granulocytes %, Automated 0.3      Lymphocytes % 16.1      Monocytes % 8.7      Eosinophils % 3.4      Basophils % 0.4      Neutrophils Absolute 6.64      Immature Granulocytes Absolute, Automated 0.03      Lymphocytes Absolute 1.51      Monocytes Absolute 0.81      Eosinophils Absolute 0.32      Basophils Absolute 0.04     BASIC METABOLIC PANEL - Abnormal    Glucose 164 (*)     Sodium 141      Potassium 4.1      Chloride 102      Bicarbonate 29      Urea Nitrogen 33 (*)     Creatinine 2.50 (*)     eGFR 20 (*)     Calcium 8.4 (*)     Anion Gap 10         Radiology: ordered and independent interpretation performed.  Xray(s) chest x-rays interpreted by me shows no large pneumothorax at this time.    ECG/medicine tests: ordered and independent interpretation performed.  ED Course as of 04/27/24 0937   Sat Apr 27, 2024   0855 EKG presented to me at 8:55 AM     EKG as interpreted by me shows normal sinus rhythm at a ventricular rate of 64 bpm, left axis deviation, no STEMI.   [DH]      ED Course User Index  [DH] Silver Cole MD         Diagnoses as of 04/27/24 0937   Fall, initial encounter     Lab work as interpreted by me shows no significant leukocytosis or anemia on CBC and otherwise basic metabolic panel shows no significant electrolyte abnormality and baseline CKD.    Patient otherwise acting normally here in the emergency room and will return home with family.  No indication at this time for CT head as patient is otherwise ambulatory, without complaints of headache, dizziness, and otherwise no history of head strike or loss of consciousness.    PLAN AND FOLLOW-UP: Patient counseled on all findings, diagnosis and treatment plan. Patient's questions and concerns addressed. Patient stable, discharged with instructions to follow up with PMD, and to return to ED at any time for worsening symptoms or any other concerns. Patient demonstrates understanding of the findings and the importance of appropriate follow up  care.    ED Medications managed:    Medications - No data to display    PATIENT REFERRED TO:  Poly Diaz, APRN-CNP  [address] [place]  [place] [address]  [phone]            DISCHARGE MEDICATIONS:  New Prescriptions    No medications on file       Silver Cole MD  9:37 AM    Attending Emergency Physician  [facility] [place]            Procedure  Procedures     Silver Cole MD  04/27/24 0937

## 2024-04-29 ENCOUNTER — APPOINTMENT (OUTPATIENT)
Dept: RADIOLOGY | Facility: HOSPITAL | Age: 69
End: 2024-04-29
Payer: MEDICARE

## 2024-04-29 ENCOUNTER — APPOINTMENT (OUTPATIENT)
Dept: CARDIOLOGY | Facility: HOSPITAL | Age: 69
End: 2024-04-29
Payer: MEDICARE

## 2024-04-29 ENCOUNTER — HOSPITAL ENCOUNTER (OUTPATIENT)
Facility: HOSPITAL | Age: 69
Setting detail: OBSERVATION
LOS: 3 days | Discharge: HOME HEALTH | End: 2024-05-02
Attending: EMERGENCY MEDICINE | Admitting: INTERNAL MEDICINE
Payer: MEDICARE

## 2024-04-29 DIAGNOSIS — Z79.4 TYPE 2 DIABETES MELLITUS WITH HYPERGLYCEMIA, WITH LONG-TERM CURRENT USE OF INSULIN (MULTI): ICD-10-CM

## 2024-04-29 DIAGNOSIS — S41.101A OPEN WOUND OF RIGHT UPPER ARM, INITIAL ENCOUNTER: ICD-10-CM

## 2024-04-29 DIAGNOSIS — S51.801A OPEN WOUND OF RIGHT FOREARM, INITIAL ENCOUNTER: ICD-10-CM

## 2024-04-29 DIAGNOSIS — N30.01 ACUTE CYSTITIS WITH HEMATURIA: ICD-10-CM

## 2024-04-29 DIAGNOSIS — N18.5 CHRONIC KIDNEY DISEASE, STAGE V (MULTI): ICD-10-CM

## 2024-04-29 DIAGNOSIS — S81.802A OPEN WOUND OF LEFT LOWER LEG, INITIAL ENCOUNTER: ICD-10-CM

## 2024-04-29 DIAGNOSIS — E11.65 TYPE 2 DIABETES MELLITUS WITH HYPERGLYCEMIA, WITH LONG-TERM CURRENT USE OF INSULIN (MULTI): ICD-10-CM

## 2024-04-29 DIAGNOSIS — S81.801A UNSPECIFIED OPEN WOUND, RIGHT LOWER LEG, INITIAL ENCOUNTER: ICD-10-CM

## 2024-04-29 DIAGNOSIS — S30.810A: ICD-10-CM

## 2024-04-29 DIAGNOSIS — L08.9: ICD-10-CM

## 2024-04-29 DIAGNOSIS — L03.115 CELLULITIS OF RIGHT LOWER EXTREMITY: Primary | ICD-10-CM

## 2024-04-29 LAB
ALBUMIN SERPL-MCNC: 3.2 G/DL (ref 3.5–5)
ALP BLD-CCNC: 108 U/L (ref 35–125)
ALT SERPL-CCNC: 14 U/L (ref 5–40)
ANION GAP SERPL CALC-SCNC: 13 MMOL/L
APPEARANCE UR: ABNORMAL
AST SERPL-CCNC: 18 U/L (ref 5–40)
ATRIAL RATE: 64 BPM
BACTERIA #/AREA URNS AUTO: ABNORMAL /HPF
BASOPHILS # BLD AUTO: 0.07 X10*3/UL (ref 0–0.1)
BASOPHILS NFR BLD AUTO: 0.6 %
BILIRUB SERPL-MCNC: 0.2 MG/DL (ref 0.1–1.2)
BILIRUB UR STRIP.AUTO-MCNC: NEGATIVE MG/DL
BUN SERPL-MCNC: 53 MG/DL (ref 8–25)
CALCIUM SERPL-MCNC: 8.3 MG/DL (ref 8.5–10.4)
CHLORIDE SERPL-SCNC: 97 MMOL/L (ref 97–107)
CO2 SERPL-SCNC: 26 MMOL/L (ref 24–31)
COLOR UR: YELLOW
CREAT SERPL-MCNC: 3.6 MG/DL (ref 0.4–1.6)
EGFRCR SERPLBLD CKD-EPI 2021: 13 ML/MIN/1.73M*2
EOSINOPHIL # BLD AUTO: 0.01 X10*3/UL (ref 0–0.7)
EOSINOPHIL NFR BLD AUTO: 0.1 %
ERYTHROCYTE [DISTWIDTH] IN BLOOD BY AUTOMATED COUNT: 16.5 % (ref 11.5–14.5)
GLUCOSE SERPL-MCNC: 216 MG/DL (ref 65–99)
GLUCOSE UR STRIP.AUTO-MCNC: ABNORMAL MG/DL
HCT VFR BLD AUTO: 37.9 % (ref 36–46)
HGB BLD-MCNC: 10.9 G/DL (ref 12–16)
IMM GRANULOCYTES # BLD AUTO: 0.05 X10*3/UL (ref 0–0.7)
IMM GRANULOCYTES NFR BLD AUTO: 0.5 % (ref 0–0.9)
KETONES UR STRIP.AUTO-MCNC: NEGATIVE MG/DL
LACTATE BLDV-SCNC: 1.6 MMOL/L (ref 0.4–2)
LEUKOCYTE ESTERASE UR QL STRIP.AUTO: ABNORMAL
LYMPHOCYTES # BLD AUTO: 2.14 X10*3/UL (ref 1.2–4.8)
LYMPHOCYTES NFR BLD AUTO: 19.8 %
MCH RBC QN AUTO: 29.2 PG (ref 26–34)
MCHC RBC AUTO-ENTMCNC: 28.8 G/DL (ref 32–36)
MCV RBC AUTO: 102 FL (ref 80–100)
MONOCYTES # BLD AUTO: 1.03 X10*3/UL (ref 0.1–1)
MONOCYTES NFR BLD AUTO: 9.5 %
MUCOUS THREADS #/AREA URNS AUTO: ABNORMAL /LPF
NEUTROPHILS # BLD AUTO: 7.53 X10*3/UL (ref 1.2–7.7)
NEUTROPHILS NFR BLD AUTO: 69.5 %
NITRITE UR QL STRIP.AUTO: NEGATIVE
NRBC BLD-RTO: 0.2 /100 WBCS (ref 0–0)
NT-PROBNP SERPL-MCNC: ABNORMAL PG/ML (ref 0–353)
P AXIS: 102 DEGREES
P OFFSET: 157 MS
P ONSET: 116 MS
PH UR STRIP.AUTO: 6 [PH]
PLATELET # BLD AUTO: 273 X10*3/UL (ref 150–450)
POTASSIUM SERPL-SCNC: 4.9 MMOL/L (ref 3.4–5.1)
PR INTERVAL: 198 MS
PROT SERPL-MCNC: 6.8 G/DL (ref 5.9–7.9)
PROT UR STRIP.AUTO-MCNC: ABNORMAL MG/DL
Q ONSET: 215 MS
QRS COUNT: 11 BEATS
QRS DURATION: 118 MS
QT INTERVAL: 466 MS
QTC CALCULATION(BAZETT): 480 MS
QTC FREDERICIA: 476 MS
R AXIS: -55 DEGREES
RBC # BLD AUTO: 3.73 X10*6/UL (ref 4–5.2)
RBC # UR STRIP.AUTO: ABNORMAL /UL
RBC #/AREA URNS AUTO: >20 /HPF
SODIUM SERPL-SCNC: 136 MMOL/L (ref 133–145)
SP GR UR STRIP.AUTO: 1.02
SQUAMOUS #/AREA URNS AUTO: ABNORMAL /HPF
T AXIS: 77 DEGREES
T OFFSET: 448 MS
UROBILINOGEN UR STRIP.AUTO-MCNC: NORMAL MG/DL
VENTRICULAR RATE: 64 BPM
WBC # BLD AUTO: 10.8 X10*3/UL (ref 4.4–11.3)
WBC #/AREA URNS AUTO: >50 /HPF
WBC CLUMPS #/AREA URNS AUTO: ABNORMAL /HPF
YEAST BUDDING #/AREA UR COMP ASSIST: PRESENT /HPF

## 2024-04-29 PROCEDURE — 87075 CULTR BACTERIA EXCEPT BLOOD: CPT | Mod: 91,TRILAB,WESLAB | Performed by: CLINICAL NURSE SPECIALIST

## 2024-04-29 PROCEDURE — 93971 EXTREMITY STUDY: CPT | Mod: MUEWO,MUE

## 2024-04-29 PROCEDURE — 73590 X-RAY EXAM OF LOWER LEG: CPT | Mod: RIGHT SIDE | Performed by: RADIOLOGY

## 2024-04-29 PROCEDURE — 36415 COLL VENOUS BLD VENIPUNCTURE: CPT | Performed by: CLINICAL NURSE SPECIALIST

## 2024-04-29 PROCEDURE — 83880 ASSAY OF NATRIURETIC PEPTIDE: CPT | Performed by: CLINICAL NURSE SPECIALIST

## 2024-04-29 PROCEDURE — 73090 X-RAY EXAM OF FOREARM: CPT | Mod: RIGHT SIDE | Performed by: RADIOLOGY

## 2024-04-29 PROCEDURE — 83605 ASSAY OF LACTIC ACID: CPT | Performed by: CLINICAL NURSE SPECIALIST

## 2024-04-29 PROCEDURE — 2500000004 HC RX 250 GENERAL PHARMACY W/ HCPCS (ALT 636 FOR OP/ED): Performed by: EMERGENCY MEDICINE

## 2024-04-29 PROCEDURE — 87086 URINE CULTURE/COLONY COUNT: CPT | Mod: TRILAB,WESLAB | Performed by: CLINICAL NURSE SPECIALIST

## 2024-04-29 PROCEDURE — 2500000004 HC RX 250 GENERAL PHARMACY W/ HCPCS (ALT 636 FOR OP/ED): Performed by: CLINICAL NURSE SPECIALIST

## 2024-04-29 PROCEDURE — 99291 CRITICAL CARE FIRST HOUR: CPT | Mod: 25 | Performed by: CLINICAL NURSE SPECIALIST

## 2024-04-29 PROCEDURE — 96365 THER/PROPH/DIAG IV INF INIT: CPT

## 2024-04-29 PROCEDURE — 87075 CULTR BACTERIA EXCEPT BLOOD: CPT | Mod: 91,TRILAB | Performed by: CLINICAL NURSE SPECIALIST

## 2024-04-29 PROCEDURE — 71046 X-RAY EXAM CHEST 2 VIEWS: CPT

## 2024-04-29 PROCEDURE — 93005 ELECTROCARDIOGRAM TRACING: CPT

## 2024-04-29 PROCEDURE — 87040 BLOOD CULTURE FOR BACTERIA: CPT | Mod: 91,TRILAB | Performed by: CLINICAL NURSE SPECIALIST

## 2024-04-29 PROCEDURE — 93971 EXTREMITY STUDY: CPT | Performed by: RADIOLOGY

## 2024-04-29 PROCEDURE — 96366 THER/PROPH/DIAG IV INF ADDON: CPT

## 2024-04-29 PROCEDURE — 81001 URINALYSIS AUTO W/SCOPE: CPT | Performed by: CLINICAL NURSE SPECIALIST

## 2024-04-29 PROCEDURE — 73030 X-RAY EXAM OF SHOULDER: CPT | Mod: RIGHT SIDE | Performed by: RADIOLOGY

## 2024-04-29 PROCEDURE — 1100000001 HC PRIVATE ROOM DAILY

## 2024-04-29 PROCEDURE — 96367 TX/PROPH/DG ADDL SEQ IV INF: CPT

## 2024-04-29 PROCEDURE — 80053 COMPREHEN METABOLIC PANEL: CPT | Performed by: CLINICAL NURSE SPECIALIST

## 2024-04-29 PROCEDURE — 93971 EXTREMITY STUDY: CPT

## 2024-04-29 PROCEDURE — 85025 COMPLETE CBC W/AUTO DIFF WBC: CPT | Performed by: CLINICAL NURSE SPECIALIST

## 2024-04-29 PROCEDURE — 73590 X-RAY EXAM OF LOWER LEG: CPT | Mod: RT

## 2024-04-29 PROCEDURE — 71046 X-RAY EXAM CHEST 2 VIEWS: CPT | Performed by: RADIOLOGY

## 2024-04-29 PROCEDURE — 73030 X-RAY EXAM OF SHOULDER: CPT | Mod: RT

## 2024-04-29 PROCEDURE — 73090 X-RAY EXAM OF FOREARM: CPT | Mod: RT

## 2024-04-29 RX ORDER — MEROPENEM 1 G/1
1000 INJECTION, POWDER, FOR SOLUTION INTRAVENOUS ONCE
Status: COMPLETED | OUTPATIENT
Start: 2024-04-29 | End: 2024-04-29

## 2024-04-29 RX ORDER — VANCOMYCIN 1 G/200ML
1000 INJECTION, SOLUTION INTRAVENOUS ONCE
Status: COMPLETED | OUTPATIENT
Start: 2024-04-29 | End: 2024-04-29

## 2024-04-29 RX ORDER — INSULIN LISPRO 100 [IU]/ML
INJECTION, SOLUTION INTRAVENOUS; SUBCUTANEOUS
Qty: 5 EACH | Refills: 3 | Status: SHIPPED | OUTPATIENT
Start: 2024-04-29

## 2024-04-29 RX ORDER — INSULIN GLARGINE 100 [IU]/ML
10 INJECTION, SOLUTION SUBCUTANEOUS NIGHTLY
Qty: 9 ML | Refills: 3 | Status: SHIPPED | OUTPATIENT
Start: 2024-04-29

## 2024-04-29 RX ADMIN — SODIUM CHLORIDE 10 ML: 9 INJECTION, SOLUTION INTRAVENOUS at 18:40

## 2024-04-29 RX ADMIN — MEROPENEM 1000 MG: 1 INJECTION, POWDER, FOR SOLUTION INTRAVENOUS at 19:37

## 2024-04-29 RX ADMIN — VANCOMYCIN 1000 MG: 1 INJECTION, SOLUTION INTRAVENOUS at 16:56

## 2024-04-29 SDOH — SOCIAL STABILITY: SOCIAL INSECURITY: HAVE YOU HAD THOUGHTS OF HARMING ANYONE ELSE?: NO

## 2024-04-29 SDOH — SOCIAL STABILITY: SOCIAL INSECURITY: HAVE YOU HAD ANY THOUGHTS OF HARMING ANYONE ELSE?: NO

## 2024-04-29 SDOH — SOCIAL STABILITY: SOCIAL INSECURITY: ARE YOU OR HAVE YOU BEEN THREATENED OR ABUSED PHYSICALLY, EMOTIONALLY, OR SEXUALLY BY ANYONE?: NO

## 2024-04-29 SDOH — SOCIAL STABILITY: SOCIAL INSECURITY: ABUSE: ADULT

## 2024-04-29 SDOH — SOCIAL STABILITY: SOCIAL INSECURITY: WERE YOU ABLE TO COMPLETE ALL THE BEHAVIORAL HEALTH SCREENINGS?: YES

## 2024-04-29 SDOH — SOCIAL STABILITY: SOCIAL INSECURITY: DO YOU FEEL UNSAFE GOING BACK TO THE PLACE WHERE YOU ARE LIVING?: NO

## 2024-04-29 SDOH — SOCIAL STABILITY: SOCIAL INSECURITY: DOES ANYONE TRY TO KEEP YOU FROM HAVING/CONTACTING OTHER FRIENDS OR DOING THINGS OUTSIDE YOUR HOME?: NO

## 2024-04-29 SDOH — SOCIAL STABILITY: SOCIAL INSECURITY: HAS ANYONE EVER THREATENED TO HURT YOUR FAMILY OR YOUR PETS?: NO

## 2024-04-29 SDOH — SOCIAL STABILITY: SOCIAL INSECURITY: DO YOU FEEL ANYONE HAS EXPLOITED OR TAKEN ADVANTAGE OF YOU FINANCIALLY OR OF YOUR PERSONAL PROPERTY?: NO

## 2024-04-29 SDOH — SOCIAL STABILITY: SOCIAL INSECURITY: ARE THERE ANY APPARENT SIGNS OF INJURIES/BEHAVIORS THAT COULD BE RELATED TO ABUSE/NEGLECT?: NO

## 2024-04-29 ASSESSMENT — LIFESTYLE VARIABLES
HOW OFTEN DO YOU HAVE A DRINK CONTAINING ALCOHOL: NEVER
HOW MANY STANDARD DRINKS CONTAINING ALCOHOL DO YOU HAVE ON A TYPICAL DAY: PATIENT DOES NOT DRINK
HOW OFTEN DO YOU HAVE 6 OR MORE DRINKS ON ONE OCCASION: NEVER
AUDIT-C TOTAL SCORE: 0
SKIP TO QUESTIONS 9-10: 1
AUDIT-C TOTAL SCORE: 0

## 2024-04-29 ASSESSMENT — PAIN SCALES - GENERAL
PAINLEVEL_OUTOF10: 8
PAINLEVEL_OUTOF10: 0 - NO PAIN
PAINLEVEL_OUTOF10: 0 - NO PAIN

## 2024-04-29 ASSESSMENT — COGNITIVE AND FUNCTIONAL STATUS - GENERAL
DAILY ACTIVITIY SCORE: 20
TURNING FROM BACK TO SIDE WHILE IN FLAT BAD: A LITTLE
PATIENT BASELINE BEDBOUND: NO
WALKING IN HOSPITAL ROOM: A LITTLE
DRESSING REGULAR UPPER BODY CLOTHING: A LITTLE
MOVING TO AND FROM BED TO CHAIR: A LITTLE
TOILETING: A LITTLE
HELP NEEDED FOR BATHING: A LITTLE
CLIMB 3 TO 5 STEPS WITH RAILING: A LOT
MOVING FROM LYING ON BACK TO SITTING ON SIDE OF FLAT BED WITH BEDRAILS: A LITTLE
DRESSING REGULAR LOWER BODY CLOTHING: A LITTLE
MOBILITY SCORE: 17
STANDING UP FROM CHAIR USING ARMS: A LITTLE

## 2024-04-29 ASSESSMENT — ACTIVITIES OF DAILY LIVING (ADL)
LACK_OF_TRANSPORTATION: NO
HEARING - RIGHT EAR: FUNCTIONAL
HEARING - LEFT EAR: FUNCTIONAL
DRESSING YOURSELF: NEEDS ASSISTANCE
WALKS IN HOME: NEEDS ASSISTANCE
PATIENT'S MEMORY ADEQUATE TO SAFELY COMPLETE DAILY ACTIVITIES?: NO
GROOMING: NEEDS ASSISTANCE
FEEDING YOURSELF: NEEDS ASSISTANCE
BATHING: NEEDS ASSISTANCE
TOILETING: NEEDS ASSISTANCE
JUDGMENT_ADEQUATE_SAFELY_COMPLETE_DAILY_ACTIVITIES: YES
ADEQUATE_TO_COMPLETE_ADL: YES
ASSISTIVE_DEVICE: WALKER;SHOWER CHAIR

## 2024-04-29 ASSESSMENT — PAIN - FUNCTIONAL ASSESSMENT
PAIN_FUNCTIONAL_ASSESSMENT: 0-10
PAIN_FUNCTIONAL_ASSESSMENT: 0-10

## 2024-04-29 ASSESSMENT — PATIENT HEALTH QUESTIONNAIRE - PHQ9
1. LITTLE INTEREST OR PLEASURE IN DOING THINGS: SEVERAL DAYS
2. FEELING DOWN, DEPRESSED OR HOPELESS: SEVERAL DAYS
SUM OF ALL RESPONSES TO PHQ9 QUESTIONS 1 & 2: 2

## 2024-04-30 ENCOUNTER — APPOINTMENT (OUTPATIENT)
Dept: VASCULAR SURGERY | Facility: CLINIC | Age: 69
End: 2024-04-30
Payer: MEDICARE

## 2024-04-30 ENCOUNTER — TELEPHONE (OUTPATIENT)
Dept: PRIMARY CARE | Facility: CLINIC | Age: 69
End: 2024-04-30
Payer: MEDICARE

## 2024-04-30 LAB
ALBUMIN SERPL-MCNC: 2.6 G/DL (ref 3.5–5)
ANION GAP SERPL CALC-SCNC: 14 MMOL/L
BUN SERPL-MCNC: 57 MG/DL (ref 8–25)
CALCIUM SERPL-MCNC: 8.3 MG/DL (ref 8.5–10.4)
CHLORIDE SERPL-SCNC: 102 MMOL/L (ref 97–107)
CO2 SERPL-SCNC: 19 MMOL/L (ref 24–31)
CREAT SERPL-MCNC: 3.6 MG/DL (ref 0.4–1.6)
EGFRCR SERPLBLD CKD-EPI 2021: 13 ML/MIN/1.73M*2
GLUCOSE BLD MANUAL STRIP-MCNC: 139 MG/DL (ref 74–99)
GLUCOSE BLD MANUAL STRIP-MCNC: 143 MG/DL (ref 74–99)
GLUCOSE BLD MANUAL STRIP-MCNC: 159 MG/DL (ref 74–99)
GLUCOSE BLD MANUAL STRIP-MCNC: 169 MG/DL (ref 74–99)
GLUCOSE BLD MANUAL STRIP-MCNC: 177 MG/DL (ref 74–99)
GLUCOSE BLD MANUAL STRIP-MCNC: 187 MG/DL (ref 74–99)
GLUCOSE SERPL-MCNC: 136 MG/DL (ref 65–99)
PHOSPHATE SERPL-MCNC: 7.1 MG/DL (ref 2.5–4.5)
POTASSIUM SERPL-SCNC: 4.9 MMOL/L (ref 3.4–5.1)
SODIUM SERPL-SCNC: 135 MMOL/L (ref 133–145)

## 2024-04-30 PROCEDURE — 97165 OT EVAL LOW COMPLEX 30 MIN: CPT | Mod: GO

## 2024-04-30 PROCEDURE — 2500000002 HC RX 250 W HCPCS SELF ADMINISTERED DRUGS (ALT 637 FOR MEDICARE OP, ALT 636 FOR OP/ED): Performed by: NURSE PRACTITIONER

## 2024-04-30 PROCEDURE — 2500000002 HC RX 250 W HCPCS SELF ADMINISTERED DRUGS (ALT 637 FOR MEDICARE OP, ALT 636 FOR OP/ED): Performed by: INTERNAL MEDICINE

## 2024-04-30 PROCEDURE — 36415 COLL VENOUS BLD VENIPUNCTURE: CPT | Performed by: NURSE PRACTITIONER

## 2024-04-30 PROCEDURE — 2500000001 HC RX 250 WO HCPCS SELF ADMINISTERED DRUGS (ALT 637 FOR MEDICARE OP): Performed by: INTERNAL MEDICINE

## 2024-04-30 PROCEDURE — 82947 ASSAY GLUCOSE BLOOD QUANT: CPT | Mod: 59

## 2024-04-30 PROCEDURE — 2500000006 HC RX 250 W HCPCS SELF ADMINISTERED DRUGS (ALT 637 FOR ALL PAYERS): Mod: MUE | Performed by: INTERNAL MEDICINE

## 2024-04-30 PROCEDURE — 97162 PT EVAL MOD COMPLEX 30 MIN: CPT | Mod: GP

## 2024-04-30 PROCEDURE — 84100 ASSAY OF PHOSPHORUS: CPT | Performed by: NURSE PRACTITIONER

## 2024-04-30 PROCEDURE — 97535 SELF CARE MNGMENT TRAINING: CPT | Mod: GO

## 2024-04-30 PROCEDURE — 1100000001 HC PRIVATE ROOM DAILY

## 2024-04-30 PROCEDURE — 97116 GAIT TRAINING THERAPY: CPT | Mod: GP

## 2024-04-30 PROCEDURE — 99231 SBSQ HOSP IP/OBS SF/LOW 25: CPT | Performed by: NURSE PRACTITIONER

## 2024-04-30 RX ORDER — LEVOTHYROXINE SODIUM 100 UG/1
200 TABLET ORAL
Status: DISCONTINUED | OUTPATIENT
Start: 2024-04-30 | End: 2024-05-02 | Stop reason: HOSPADM

## 2024-04-30 RX ORDER — ACETAMINOPHEN 650 MG/1
650 SUPPOSITORY RECTAL EVERY 4 HOURS PRN
Status: DISCONTINUED | OUTPATIENT
Start: 2024-04-30 | End: 2024-05-02 | Stop reason: HOSPADM

## 2024-04-30 RX ORDER — INSULIN LISPRO 100 [IU]/ML
0-5 INJECTION, SOLUTION INTRAVENOUS; SUBCUTANEOUS EVERY 4 HOURS
Status: DISCONTINUED | OUTPATIENT
Start: 2024-04-30 | End: 2024-04-30

## 2024-04-30 RX ORDER — AMIODARONE HYDROCHLORIDE 200 MG/1
200 TABLET ORAL
Status: DISCONTINUED | OUTPATIENT
Start: 2024-04-30 | End: 2024-05-02 | Stop reason: HOSPADM

## 2024-04-30 RX ORDER — DEXTROSE 50 % IN WATER (D50W) INTRAVENOUS SYRINGE
12.5
Status: DISCONTINUED | OUTPATIENT
Start: 2024-04-30 | End: 2024-05-02 | Stop reason: HOSPADM

## 2024-04-30 RX ORDER — ACETAMINOPHEN 325 MG/1
650 TABLET ORAL EVERY 4 HOURS PRN
Status: DISCONTINUED | OUTPATIENT
Start: 2024-04-30 | End: 2024-05-02 | Stop reason: HOSPADM

## 2024-04-30 RX ORDER — VENLAFAXINE HYDROCHLORIDE 150 MG/1
150 CAPSULE, EXTENDED RELEASE ORAL DAILY
Status: DISCONTINUED | OUTPATIENT
Start: 2024-04-30 | End: 2024-05-02 | Stop reason: HOSPADM

## 2024-04-30 RX ORDER — INSULIN LISPRO 100 [IU]/ML
0-5 INJECTION, SOLUTION INTRAVENOUS; SUBCUTANEOUS
Status: DISCONTINUED | OUTPATIENT
Start: 2024-04-30 | End: 2024-05-02 | Stop reason: HOSPADM

## 2024-04-30 RX ORDER — CEPHALEXIN 500 MG/1
500 CAPSULE ORAL EVERY 8 HOURS SCHEDULED
Status: DISCONTINUED | OUTPATIENT
Start: 2024-04-30 | End: 2024-05-02 | Stop reason: HOSPADM

## 2024-04-30 RX ORDER — PANTOPRAZOLE SODIUM 40 MG/1
40 TABLET, DELAYED RELEASE ORAL 2 TIMES DAILY
Status: DISCONTINUED | OUTPATIENT
Start: 2024-04-30 | End: 2024-05-02 | Stop reason: HOSPADM

## 2024-04-30 RX ORDER — LEVOTHYROXINE SODIUM 50 UG/1
50 TABLET ORAL
Status: DISCONTINUED | OUTPATIENT
Start: 2024-04-30 | End: 2024-05-02 | Stop reason: HOSPADM

## 2024-04-30 RX ORDER — ACETAMINOPHEN 160 MG/5ML
650 SOLUTION ORAL EVERY 4 HOURS PRN
Status: DISCONTINUED | OUTPATIENT
Start: 2024-04-30 | End: 2024-05-02 | Stop reason: HOSPADM

## 2024-04-30 RX ORDER — ATORVASTATIN CALCIUM 40 MG/1
40 TABLET, FILM COATED ORAL DAILY
Status: DISCONTINUED | OUTPATIENT
Start: 2024-04-30 | End: 2024-05-02 | Stop reason: HOSPADM

## 2024-04-30 RX ORDER — CLOPIDOGREL BISULFATE 75 MG/1
75 TABLET ORAL DAILY
Status: DISCONTINUED | OUTPATIENT
Start: 2024-04-30 | End: 2024-05-02 | Stop reason: HOSPADM

## 2024-04-30 RX ORDER — HEPARIN SODIUM 5000 [USP'U]/ML
5000 INJECTION, SOLUTION INTRAVENOUS; SUBCUTANEOUS EVERY 12 HOURS
Status: DISCONTINUED | OUTPATIENT
Start: 2024-04-30 | End: 2024-04-30

## 2024-04-30 RX ORDER — INSULIN GLARGINE 100 [IU]/ML
14 INJECTION, SOLUTION SUBCUTANEOUS NIGHTLY
Status: DISCONTINUED | OUTPATIENT
Start: 2024-04-30 | End: 2024-05-02

## 2024-04-30 RX ORDER — ASPIRIN 81 MG/1
81 TABLET ORAL DAILY
Status: DISCONTINUED | OUTPATIENT
Start: 2024-04-30 | End: 2024-05-02 | Stop reason: HOSPADM

## 2024-04-30 RX ORDER — DEXTROSE 50 % IN WATER (D50W) INTRAVENOUS SYRINGE
25
Status: DISCONTINUED | OUTPATIENT
Start: 2024-04-30 | End: 2024-05-02 | Stop reason: HOSPADM

## 2024-04-30 RX ADMIN — LEVOTHYROXINE SODIUM 200 MCG: 0.1 TABLET ORAL at 06:02

## 2024-04-30 RX ADMIN — INSULIN LISPRO 1 UNITS: 100 INJECTION, SOLUTION INTRAVENOUS; SUBCUTANEOUS at 16:20

## 2024-04-30 RX ADMIN — CLOPIDOGREL BISULFATE 75 MG: 75 TABLET ORAL at 10:53

## 2024-04-30 RX ADMIN — ACETAMINOPHEN 650 MG: 325 TABLET ORAL at 16:02

## 2024-04-30 RX ADMIN — INSULIN LISPRO 1 UNITS: 100 INJECTION, SOLUTION INTRAVENOUS; SUBCUTANEOUS at 02:08

## 2024-04-30 RX ADMIN — AMIODARONE HYDROCHLORIDE 200 MG: 200 TABLET ORAL at 10:54

## 2024-04-30 RX ADMIN — CEPHALEXIN 500 MG: 500 CAPSULE ORAL at 06:01

## 2024-04-30 RX ADMIN — CEPHALEXIN 500 MG: 500 CAPSULE ORAL at 15:09

## 2024-04-30 RX ADMIN — ACETAMINOPHEN 650 MG: 325 TABLET ORAL at 21:22

## 2024-04-30 RX ADMIN — ATORVASTATIN CALCIUM 40 MG: 40 TABLET, FILM COATED ORAL at 10:53

## 2024-04-30 RX ADMIN — CEPHALEXIN 500 MG: 500 CAPSULE ORAL at 21:22

## 2024-04-30 RX ADMIN — INSULIN GLARGINE 14 UNITS: 100 INJECTION, SOLUTION SUBCUTANEOUS at 02:00

## 2024-04-30 RX ADMIN — INSULIN GLARGINE 14 UNITS: 100 INJECTION, SOLUTION SUBCUTANEOUS at 21:22

## 2024-04-30 RX ADMIN — PANTOPRAZOLE SODIUM 40 MG: 40 TABLET, DELAYED RELEASE ORAL at 10:54

## 2024-04-30 RX ADMIN — LEVOTHYROXINE SODIUM 50 MCG: 50 TABLET ORAL at 06:02

## 2024-04-30 RX ADMIN — VENLAFAXINE HYDROCHLORIDE 150 MG: 150 CAPSULE, EXTENDED RELEASE ORAL at 10:54

## 2024-04-30 RX ADMIN — PANTOPRAZOLE SODIUM 40 MG: 40 TABLET, DELAYED RELEASE ORAL at 21:22

## 2024-04-30 RX ADMIN — ASPIRIN 81 MG: 81 TABLET, COATED ORAL at 10:52

## 2024-04-30 SDOH — ECONOMIC STABILITY: TRANSPORTATION INSECURITY
IN THE PAST 12 MONTHS, HAS LACK OF TRANSPORTATION KEPT YOU FROM MEETINGS, WORK, OR FROM GETTING THINGS NEEDED FOR DAILY LIVING?: NO

## 2024-04-30 SDOH — HEALTH STABILITY: PHYSICAL HEALTH: ON AVERAGE, HOW MANY DAYS PER WEEK DO YOU ENGAGE IN MODERATE TO STRENUOUS EXERCISE (LIKE A BRISK WALK)?: 0 DAYS

## 2024-04-30 SDOH — SOCIAL STABILITY: SOCIAL NETWORK: HOW OFTEN DO YOU ATTEND CHURCH OR RELIGIOUS SERVICES?: MORE THAN 4 TIMES PER YEAR

## 2024-04-30 SDOH — HEALTH STABILITY: PHYSICAL HEALTH: ON AVERAGE, HOW MANY MINUTES DO YOU ENGAGE IN EXERCISE AT THIS LEVEL?: 0 MIN

## 2024-04-30 SDOH — SOCIAL STABILITY: SOCIAL NETWORK
IN A TYPICAL WEEK, HOW MANY TIMES DO YOU TALK ON THE PHONE WITH FAMILY, FRIENDS, OR NEIGHBORS?: MORE THAN THREE TIMES A WEEK

## 2024-04-30 SDOH — HEALTH STABILITY: PHYSICAL HEALTH
HOW OFTEN DO YOU NEED TO HAVE SOMEONE HELP YOU WHEN YOU READ INSTRUCTIONS, PAMPHLETS, OR OTHER WRITTEN MATERIAL FROM YOUR DOCTOR OR PHARMACY?: NEVER

## 2024-04-30 SDOH — ECONOMIC STABILITY: HOUSING INSECURITY: IN THE LAST 12 MONTHS, WAS THERE A TIME WHEN YOU WERE NOT ABLE TO PAY THE MORTGAGE OR RENT ON TIME?: NO

## 2024-04-30 SDOH — HEALTH STABILITY: MENTAL HEALTH
DO YOU FEEL STRESS - TENSE, RESTLESS, NERVOUS, OR ANXIOUS, OR UNABLE TO SLEEP AT NIGHT BECAUSE YOUR MIND IS TROUBLED ALL THE TIME - THESE DAYS?: NOT AT ALL

## 2024-04-30 SDOH — SOCIAL STABILITY: SOCIAL NETWORK
DO YOU BELONG TO ANY CLUBS OR ORGANIZATIONS SUCH AS CHURCH GROUPS, UNIONS, FRATERNAL OR ATHLETIC GROUPS, OR SCHOOL GROUPS?: NO

## 2024-04-30 SDOH — SOCIAL STABILITY: SOCIAL INSECURITY: WITHIN THE LAST YEAR, HAVE YOU BEEN HUMILIATED OR EMOTIONALLY ABUSED IN OTHER WAYS BY YOUR PARTNER OR EX-PARTNER?: NO

## 2024-04-30 SDOH — HEALTH STABILITY: MENTAL HEALTH: HOW OFTEN DO YOU HAVE 6 OR MORE DRINKS ON ONE OCCASION?: NEVER

## 2024-04-30 SDOH — SOCIAL STABILITY: SOCIAL INSECURITY
WITHIN THE LAST YEAR, HAVE YOU BEEN KICKED, HIT, SLAPPED, OR OTHERWISE PHYSICALLY HURT BY YOUR PARTNER OR EX-PARTNER?: NO

## 2024-04-30 SDOH — SOCIAL STABILITY: SOCIAL INSECURITY
WITHIN THE LAST YEAR, HAVE TO BEEN RAPED OR FORCED TO HAVE ANY KIND OF SEXUAL ACTIVITY BY YOUR PARTNER OR EX-PARTNER?: NO

## 2024-04-30 SDOH — SOCIAL STABILITY: SOCIAL NETWORK: HOW OFTEN DO YOU GET TOGETHER WITH FRIENDS OR RELATIVES?: MORE THAN THREE TIMES A WEEK

## 2024-04-30 SDOH — HEALTH STABILITY: MENTAL HEALTH: HOW MANY DRINKS CONTAINING ALCOHOL DO YOU HAVE ON A TYPICAL DAY WHEN YOU ARE DRINKING?: PATIENT DOES NOT DRINK

## 2024-04-30 SDOH — ECONOMIC STABILITY: FOOD INSECURITY: HOW HARD IS IT FOR YOU TO PAY FOR THE VERY BASICS LIKE FOOD, HOUSING, MEDICAL CARE, AND HEATING?: NOT HARD AT ALL

## 2024-04-30 SDOH — ECONOMIC STABILITY: INCOME INSECURITY: IN THE PAST 12 MONTHS HAS THE ELECTRIC, GAS, OIL, OR WATER COMPANY THREATENED TO SHUT OFF SERVICES IN YOUR HOME?: NO

## 2024-04-30 SDOH — HEALTH STABILITY: MENTAL HEALTH: HOW OFTEN DO YOU HAVE SIX OR MORE DRINKS ON ONE OCCASION?: NEVER

## 2024-04-30 SDOH — SOCIAL STABILITY: SOCIAL INSECURITY: WITHIN THE LAST YEAR, HAVE YOU BEEN AFRAID OF YOUR PARTNER OR EX-PARTNER?: NO

## 2024-04-30 SDOH — ECONOMIC STABILITY: INCOME INSECURITY: IN THE PAST 12 MONTHS, HAS THE ELECTRIC, GAS, OIL, OR WATER COMPANY THREATENED TO SHUT OFF SERVICE IN YOUR HOME?: NO

## 2024-04-30 SDOH — ECONOMIC STABILITY: HOUSING INSECURITY
IN THE LAST 12 MONTHS, WAS THERE A TIME WHEN YOU DID NOT HAVE A STEADY PLACE TO SLEEP OR SLEPT IN A SHELTER (INCLUDING NOW)?: NO

## 2024-04-30 SDOH — SOCIAL STABILITY: SOCIAL NETWORK: HOW OFTEN DO YOU ATTENT MEETINGS OF THE CLUB OR ORGANIZATION YOU BELONG TO?: NEVER

## 2024-04-30 SDOH — ECONOMIC STABILITY: INCOME INSECURITY: IN THE LAST 12 MONTHS, WAS THERE A TIME WHEN YOU WERE NOT ABLE TO PAY THE MORTGAGE OR RENT ON TIME?: NO

## 2024-04-30 SDOH — HEALTH STABILITY: MENTAL HEALTH
STRESS IS WHEN SOMEONE FEELS TENSE, NERVOUS, ANXIOUS, OR CAN'T SLEEP AT NIGHT BECAUSE THEIR MIND IS TROUBLED. HOW STRESSED ARE YOU?: NOT AT ALL

## 2024-04-30 SDOH — ECONOMIC STABILITY: TRANSPORTATION INSECURITY: IN THE PAST 12 MONTHS, HAS LACK OF TRANSPORTATION KEPT YOU FROM MEDICAL APPOINTMENTS OR FROM GETTING MEDICATIONS?: NO

## 2024-04-30 SDOH — ECONOMIC STABILITY: TRANSPORTATION INSECURITY
IN THE PAST 12 MONTHS, HAS THE LACK OF TRANSPORTATION KEPT YOU FROM MEDICAL APPOINTMENTS OR FROM GETTING MEDICATIONS?: NO

## 2024-04-30 SDOH — SOCIAL STABILITY: SOCIAL NETWORK: HOW OFTEN DO YOU ATTEND MEETINGS OF THE CLUBS OR ORGANIZATIONS YOU BELONG TO?: NEVER

## 2024-04-30 SDOH — SOCIAL STABILITY: SOCIAL INSECURITY
WITHIN THE LAST YEAR, HAVE YOU BEEN RAPED OR FORCED TO HAVE ANY KIND OF SEXUAL ACTIVITY BY YOUR PARTNER OR EX-PARTNER?: NO

## 2024-04-30 SDOH — SOCIAL STABILITY: SOCIAL NETWORK
DO YOU BELONG TO ANY CLUBS OR ORGANIZATIONS SUCH AS CHURCH GROUPS UNIONS, FRATERNAL OR ATHLETIC GROUPS, OR SCHOOL GROUPS?: NO

## 2024-04-30 SDOH — HEALTH STABILITY: MENTAL HEALTH: HOW OFTEN DO YOU HAVE A DRINK CONTAINING ALCOHOL?: NEVER

## 2024-04-30 SDOH — ECONOMIC STABILITY: INCOME INSECURITY: HOW HARD IS IT FOR YOU TO PAY FOR THE VERY BASICS LIKE FOOD, HOUSING, MEDICAL CARE, AND HEATING?: NOT HARD AT ALL

## 2024-04-30 SDOH — HEALTH STABILITY: MENTAL HEALTH: HOW MANY STANDARD DRINKS CONTAINING ALCOHOL DO YOU HAVE ON A TYPICAL DAY?: PATIENT DOES NOT DRINK

## 2024-04-30 ASSESSMENT — COGNITIVE AND FUNCTIONAL STATUS - GENERAL
STANDING UP FROM CHAIR USING ARMS: A LITTLE
HELP NEEDED FOR BATHING: A LITTLE
DRESSING REGULAR UPPER BODY CLOTHING: A LITTLE
EATING MEALS: A LITTLE
DRESSING REGULAR UPPER BODY CLOTHING: A LITTLE
CLIMB 3 TO 5 STEPS WITH RAILING: A LOT
TOILETING: A LITTLE
TURNING FROM BACK TO SIDE WHILE IN FLAT BAD: A LITTLE
EATING MEALS: A LITTLE
TOILETING: A LITTLE
MOBILITY SCORE: 17
STANDING UP FROM CHAIR USING ARMS: A LITTLE
CLIMB 3 TO 5 STEPS WITH RAILING: A LOT
MOVING TO AND FROM BED TO CHAIR: A LITTLE
HELP NEEDED FOR BATHING: A LITTLE
MOBILITY SCORE: 17
PERSONAL GROOMING: A LITTLE
PERSONAL GROOMING: A LITTLE
DAILY ACTIVITIY SCORE: 18
TURNING FROM BACK TO SIDE WHILE IN FLAT BAD: A LITTLE
DRESSING REGULAR LOWER BODY CLOTHING: A LITTLE
MOVING FROM LYING ON BACK TO SITTING ON SIDE OF FLAT BED WITH BEDRAILS: A LITTLE
WALKING IN HOSPITAL ROOM: A LITTLE
DRESSING REGULAR LOWER BODY CLOTHING: A LITTLE
MOVING TO AND FROM BED TO CHAIR: A LITTLE
WALKING IN HOSPITAL ROOM: A LITTLE
DAILY ACTIVITIY SCORE: 18
MOVING FROM LYING ON BACK TO SITTING ON SIDE OF FLAT BED WITH BEDRAILS: A LITTLE

## 2024-04-30 ASSESSMENT — PAIN SCALES - GENERAL
PAINLEVEL_OUTOF10: 0 - NO PAIN
PAINLEVEL_OUTOF10: 3

## 2024-04-30 ASSESSMENT — PAIN - FUNCTIONAL ASSESSMENT
PAIN_FUNCTIONAL_ASSESSMENT: 0-10

## 2024-04-30 ASSESSMENT — LIFESTYLE VARIABLES
AUDIT-C TOTAL SCORE: 0
SKIP TO QUESTIONS 9-10: 1

## 2024-04-30 ASSESSMENT — ACTIVITIES OF DAILY LIVING (ADL)
ADL_ASSISTANCE: INDEPENDENT
LACK_OF_TRANSPORTATION: NO
BATHING_ASSISTANCE: MINIMAL
ADL_ASSISTANCE: INDEPENDENT
LACK_OF_TRANSPORTATION: NO
HOME_MANAGEMENT_TIME_ENTRY: 13

## 2024-04-30 ASSESSMENT — PAIN SCALES - WONG BAKER: WONGBAKER_NUMERICALRESPONSE: HURTS LITTLE BIT

## 2024-04-30 NOTE — CONSULTS
CONSULT: Nephrology SERVICE    SERVICE DATE: 4/30/2024   SERVICE TIME:  8:56 AM    REASON FOR CONSULT: End-stage renal disease management  REQUESTING PHYSICIAN: Dr. Levi  PRIMARY CARE PHYSICIAN: CARLENE King-CNP    ASSESSMENT AND PLAN  68-year-old woman with end-stage renal disease presented with dialysis catheter malpositioning.  1.  End-stage renal disease   2.  Malpositioned hemodialysis catheter  3.  Fluid overload  4.  Hyperkalemia  5.  Essential hypertension  6.  Anemia of chronic kidney disease    This woman is hospitalized solely to get a new tunneled hemodialysis catheter implantation.  I do not see any evidence of active infection going on.  Her labs are consistent with that of an end-stage renal disease patient.  She is getting some fluid overload in her chest.  Her potassium is trending upward.  She is behind in her dialysis, last hemodialysis was 5 days ago.  I have consulted interventional radiology for hemodialysis tunneled catheter implantation.  Hemodialysis after catheter placement, thereafter acceptable for discharge from my perspective.  I will continue to follow her while here.  Thank you for the consultation.    SUBJECTIVE  Ms. Morris is a 68 y.o. woman with a history of CHF, COPD, and end-stage renal disease admitted due to malpositioned dialysis catheter, consulted for dialysis management.  This patient regularly dialyzes on Tuesdays, Thursdays, Saturdays at [facility] in [place].  She recently commenced on hemodialysis.  Dialysis has been largely uneventful, but she had a fall a few days ago.  She says that she had a big gash in her right lower extremity and it was quite bloody.  She also noted blood on her chest, but did not seem to notice the fact that her hemodialysis catheter, which was in the right internal jugular vein, fell out.  Oddly, she showed up at her dialysis facility on Saturday and was sent home with no dialysis.  Her last hemodialysis was 5 days ago.  She denies any  dyspnea.  She is comfortable on room air.  Really voices no concerns.  There is no bleeding about her right chest.    PAST MEDICAL HISTORY:   Past Medical History:   Diagnosis Date    Anal fissure 10/12/2023    Anemia     ASHD (arteriosclerotic heart disease)     At risk for falls     Atrial fibrillation (Multi)     CHF (congestive heart failure) (Multi)     CKD (chronic kidney disease)     COPD (chronic obstructive pulmonary disease) (Multi)     CVA (cerebral vascular accident) (Multi)     - right-sided weakness    Depression     Diabetes mellitus (Multi)     GERD (gastroesophageal reflux disease)     H/O pleural effusion     Hyperlipidemia     Hypertension     Hypothyroidism     Hypoxia     Impacted cerumen, left ear     Impacted cerumen of left ear    Noncompliance     Osteoarthritis     Perianal dermatitis 10/12/2023    Personal history of other diseases of the respiratory system     History of acute bronchitis    PVD (peripheral vascular disease) (CMS-HCC)     Renal carcinoma, right (Multi)     s/p partial nephrectomy 2021    Respiratory failure (Multi)     TIA (transient ischemic attack)     Vasculitis (CMS-HCC) 10/12/2023    Weakness      PAST SURGICAL HISTORY:   Past Surgical History:   Procedure Laterality Date    ANKLE SURGERY          CARDIAC CATHETERIZATION N/A 2024    Procedure: Right Heart Cath;  Surgeon: Nicholas Antonio MD;  Location: [facility] Cardiac Cath Lab;  Service: Cardiovascular;  Laterality: N/A;    CATARACT EXTRACTION Right     2019     SECTION, CLASSIC      MR CHEST ANGIO W AND WO IV CONTRAST  2023    MR CHEST ANGIO W AND WO IV CONTRAST 2023 [facility] MRI    MR HEAD ANGIO WO IV CONTRAST  2021    MR HEAD ANGIO WO IV CONTRAST [facility]    NEPHRECTOMY  2021    SHOULDER SURGERY           FAMILY HISTORY:   Family History   Problem Relation Name Age of Onset    Hypertension Mother      Diabetes Father      Heart disease Father       Cancer Sister      Cancer Brother      Diabetes Daughter      Asthma Daughter      Heart attack Daughter      Diabetes Maternal Grandfather      Heart disease Paternal Grandmother      Diabetes Other      Hypertension Other      COPD Other      Other (chronic lung disease) Other       SOCIAL HISTORY:   Social History     Tobacco Use    Smoking status: Former     Current packs/day: 0.50     Average packs/day: 0.5 packs/day for 55.3 years (27.7 ttl pk-yrs)     Types: Cigarettes     Start date: 1/1/1969     Passive exposure: Never    Smokeless tobacco: Never   Vaping Use    Vaping status: Never Used   Substance Use Topics    Alcohol use: Not Currently    Drug use: Yes     Types: Marijuana     MEDICATIONS:  amiodarone, 200 mg, oral, Daily with breakfast  aspirin, 81 mg, oral, Daily  atorvastatin, 40 mg, oral, Daily  cephalexin, 500 mg, oral, q8h SUMIT  clopidogrel, 75 mg, oral, Daily  heparin, 5,000 Units, subcutaneous, q12h  insulin glargine, 14 Units, subcutaneous, Nightly  insulin lispro, 0-5 Units, subcutaneous, q4h  levothyroxine, 200 mcg, oral, Daily before breakfast  levothyroxine, 50 mcg, oral, Daily before breakfast  pantoprazole, 40 mg, oral, BID  venlafaxine XR, 150 mg, oral, Daily           PRN medications: dextrose, dextrose, glucagon, glucagon   CURRENT ALLERGIES:   Allergies as of 04/29/2024 - Reviewed 04/29/2024   Allergen Reaction Noted    Bee venom protein (honey bee) Swelling 10/03/2023    Citalopram Other, Rash, and Hives 09/21/2021    Codeine Other and Headache 09/21/2021    Influenza virus vaccines Hives 04/27/2024    Latex Other 09/21/2021    Lisinopril Swelling 09/21/2021    Adhesive tape-silicones Other 09/21/2021    Amoxicillin Swelling 09/21/2021    Doxycycline Rash 09/21/2021    Oseltamivir Rash 07/30/2023    Phenyleph-min oil-petrolatum Other 04/27/2024    Phenyleph-shark oil-glyc-pet Rash 10/03/2023    Phenylephrine Nausea/vomiting 03/21/2024    Prednisone Other 09/21/2021    Tuberculin ppd  "Unknown 03/21/2024    Xylometazoline Unknown 01/31/2024       COMPLETE REVIEW OF SYSTEMS:    Full ROS was negative unless mentioned above    OBJECTIVE  PHYSICAL EXAM  Heart Rate:  [56-68]   Temp:  [36.2 °C (97.2 °F)-36.8 °C (98.2 °F)]   Resp:  [16-18]   BP: ()/(62-98)   Height:  [167.6 cm (5' 6\")]   Weight:  [75.8 kg (167 lb)]   SpO2:  [93 %-100 %]    Body mass index is 26.95 kg/m².  This is a chronically ill-appearing elderly obese white woman  Pale skin  Hearing intact  Phonation intact  Moist mucosa  Systolic murmur  Lungs are clear to auscultation bilaterally  Abdomen is soft, nondistended, nontender, positive bowel sounds  No Fay catheter in place, no suprapubic tenderness to palpation  Bilateral chronic venous stasis changes, right lower extremity is extensively wrapped, no major edema  No trauma to her right upper chest  Moves 4 limbs spontaneously  No obvious joint deformities  No lymphadenopathy    DATA:   Labs:  Results for orders placed or performed during the hospital encounter of 04/29/24 (from the past 96 hour(s))   Comprehensive metabolic panel   Result Value Ref Range    Glucose 216 (H) 65 - 99 mg/dL    Sodium 136 133 - 145 mmol/L    Potassium 4.9 3.4 - 5.1 mmol/L    Chloride 97 97 - 107 mmol/L    Bicarbonate 26 24 - 31 mmol/L    Urea Nitrogen 53 (H) 8 - 25 mg/dL    Creatinine 3.60 (H) 0.40 - 1.60 mg/dL    eGFR 13 (L) >60 mL/min/1.73m*2    Calcium 8.3 (L) 8.5 - 10.4 mg/dL    Albumin 3.2 (L) 3.5 - 5.0 g/dL    Alkaline Phosphatase 108 35 - 125 U/L    Total Protein 6.8 5.9 - 7.9 g/dL    AST 18 5 - 40 U/L    Bilirubin, Total 0.2 0.1 - 1.2 mg/dL    ALT 14 5 - 40 U/L    Anion Gap 13 <=19 mmol/L   NT Pro-BNP   Result Value Ref Range    PROBNP 29,640 (H) 0 - 353 pg/mL   Blood Culture    Specimen: Peripheral Venipuncture; Blood culture   Result Value Ref Range    Blood Culture Loaded on Instrument - Culture in progress    Blood Culture    Specimen: Peripheral Venipuncture; Blood culture   Result Value " Ref Range    Blood Culture Loaded on Instrument - Culture in progress    BLOOD GAS LACTIC ACID, VENOUS   Result Value Ref Range    POCT Lactate, Venous 1.6 0.4 - 2.0 mmol/L   CBC and Auto Differential   Result Value Ref Range    WBC 10.8 4.4 - 11.3 x10*3/uL    nRBC 0.2 (H) 0.0 - 0.0 /100 WBCs    RBC 3.73 (L) 4.00 - 5.20 x10*6/uL    Hemoglobin 10.9 (L) 12.0 - 16.0 g/dL    Hematocrit 37.9 36.0 - 46.0 %     (H) 80 - 100 fL    MCH 29.2 26.0 - 34.0 pg    MCHC 28.8 (L) 32.0 - 36.0 g/dL    RDW 16.5 (H) 11.5 - 14.5 %    Platelets 273 150 - 450 x10*3/uL    Neutrophils % 69.5 40.0 - 80.0 %    Immature Granulocytes %, Automated 0.5 0.0 - 0.9 %    Lymphocytes % 19.8 13.0 - 44.0 %    Monocytes % 9.5 2.0 - 10.0 %    Eosinophils % 0.1 0.0 - 6.0 %    Basophils % 0.6 0.0 - 2.0 %    Neutrophils Absolute 7.53 1.20 - 7.70 x10*3/uL    Immature Granulocytes Absolute, Automated 0.05 0.00 - 0.70 x10*3/uL    Lymphocytes Absolute 2.14 1.20 - 4.80 x10*3/uL    Monocytes Absolute 1.03 (H) 0.10 - 1.00 x10*3/uL    Eosinophils Absolute 0.01 0.00 - 0.70 x10*3/uL    Basophils Absolute 0.07 0.00 - 0.10 x10*3/uL   Urinalysis with Reflex Culture and Microscopic   Result Value Ref Range    Color, Urine Yellow Light-Yellow, Yellow, Dark-Yellow    Appearance, Urine Ex.Turbid (N) Clear    Specific Gravity, Urine 1.023 1.005 - 1.035    pH, Urine 6.0 5.0, 5.5, 6.0, 6.5, 7.0, 7.5, 8.0    Protein, Urine 600 (3+) (A) NEGATIVE, 10 (TRACE), 20 (TRACE) mg/dL    Glucose, Urine 100 (1+) (A) Normal mg/dL    Blood, Urine 0.1 (1+) (A) NEGATIVE    Ketones, Urine NEGATIVE NEGATIVE mg/dL    Bilirubin, Urine NEGATIVE NEGATIVE    Urobilinogen, Urine Normal Normal mg/dL    Nitrite, Urine NEGATIVE NEGATIVE    Leukocyte Esterase, Urine 250 David/µL (A) NEGATIVE   Microscopic Only, Urine   Result Value Ref Range    WBC, Urine >50 (A) 1-5, NONE /HPF    WBC Clumps, Urine FEW Reference range not established. /HPF    RBC, Urine >20 (A) NONE, 1-2, 3-5 /HPF    Squamous  Epithelial Cells, Urine 10-25 (FEW) Reference range not established. /HPF    Bacteria, Urine 3+ (A) NONE SEEN /HPF    Budding Yeast, Urine PRESENT (A) NONE /HPF    Mucus, Urine FEW Reference range not established. /LPF   POCT GLUCOSE   Result Value Ref Range    POCT Glucose 169 (H) 74 - 99 mg/dL   POCT GLUCOSE   Result Value Ref Range    POCT Glucose 143 (H) 74 - 99 mg/dL   POCT GLUCOSE   Result Value Ref Range    POCT Glucose 139 (H) 74 - 99 mg/dL       SIGNATURE: Joseph Mitchell MD PATIENT NAME: Daphne Morris   DATE: April 30, 2024 MRN: [id number]   TIME: 8:56 AM PAGER: [id number]

## 2024-04-30 NOTE — PROGRESS NOTES
Occupational Therapy    Evaluation/Treatment    Patient Name: Daphne Morris  MRN: [id number]  : 1955  Today's Date: 24  Time Calculation  Start Time: 1000  Stop Time: 1023  Time Calculation (min): 23 min       Assessment:  OT Assessment: 69 y/o female s/p fall and dialysis catheter dislodgement at home. on eval she requires assist x1 for xfers, mobility and self care d/t decreased strength, balance, activity tolerance. skilled OT services are required to maximize safety/IND prior to DC  Prognosis: Excellent  Barriers to Discharge: Decreased caregiver support  Evaluation/Treatment Tolerance: Patient tolerated treatment well  Medical Staff Made Aware: Yes  End of Session Communication: Bedside nurse  End of Session Patient Position: Up in chair, Alarm on  OT Assessment Results: Decreased ADL status, Decreased upper extremity strength, Decreased safe judgment during ADL, Decreased endurance, Decreased functional mobility  Prognosis: Excellent  Barriers to Discharge: Decreased caregiver support  Evaluation/Treatment Tolerance: Patient tolerated treatment well  Medical Staff Made Aware: Yes  Strengths: Premorbid level of function, Rehab experience  Barriers to Participation: Comorbidities    Plan:  Treatment Interventions: ADL retraining, Functional transfer training, UE strengthening/ROM, Endurance training, Cognitive reorientation, Patient/family training, Equipment evaluation/education, Neuromuscular reeducation, Compensatory technique education  OT Frequency: 3 times per week  OT Discharge Recommendations: Low intensity level of continued care, Other (Comment) (would benefit from increased supervision to maintain safety)  Equipment Recommended upon Discharge: Wheeled walker  OT Recommended Transfer Status: Assist of 1  OT - OK to Discharge: Yes  Treatment Interventions: ADL retraining, Functional transfer training, UE strengthening/ROM, Endurance training, Cognitive reorientation, Patient/family  training, Equipment evaluation/education, Neuromuscular reeducation, Compensatory technique education    Subjective   Current Problem:  1. Cellulitis of right lower extremity        2. Chronic kidney disease, stage V (Multi)        3. Unspecified open wound, right lower leg, initial encounter        4. Open wound of right forearm, initial encounter        5. Open wound of right upper arm, initial encounter        6. Open wound of left lower leg, initial encounter        7. Infected abrasion of buttock, initial encounter        8. Acute cystitis with hematuria          General:   OT Received On: 04/30/24  General  Reason for Referral: Decreased ADLs, fall  Referred By: Dr. Levi  Past Medical History Relevant to Rehab: COPD, hypothyroidism, DM, HTN, ESRD dialysis Tu, Th, Sa  Family/Caregiver Present: No  Prior to Session Communication: Bedside nurse  Patient Position Received: Bed, 3 rail up, Alarm on  Preferred Learning Style: verbal, visual  General Comment: Cleared by nsg, pt met in supine, agreeable to OT session  Precautions:  Medical Precautions: Fall precautions, Oxygen therapy device and L/min (on 3L O2 via NC)    Pain:  Pain Assessment  Pain Assessment: 0-10  Pain Score: 0 - No pain    Objective   Cognition:  Overall Cognitive Status: Within Functional Limits  Insight: Mild  Impulsive: Mildly    Home Living:  Type of Home: Mobile home  Lives With: Alone  Home Adaptive Equipment: Walker rolling or standard, Other (Comment), Wheelchair-manual (rollator)  Home Layout: One level  Home Access: Stairs to enter with rails  Entrance Stairs-Rails: Right  Entrance Stairs-Number of Steps: 5  Prior Function:  Level of [place]: Independent with ADLs and functional transfers, Independent with homemaking with ambulation  Receives Help From: Friends, Neighbor  ADL Assistance: Independent  Homemaking Assistance: Independent  Ambulatory Assistance: Independent (mod I with rollator)  Prior Function Comments: has only been  "home from SNF for <1 week. reports \"too many falls to count\" in the last 6months. has a neighbor and friends who can check in daily but are unable to stay with pt    ADL:  Eating Assistance: Stand by  Eating Deficit: Setup  Grooming Assistance: Stand by  Grooming Deficit: Setup  Bathing Assistance: Minimal  UE Dressing Assistance: Minimal  UE Dressing Deficit: Pull around back  LE Dressing Assistance: Moderate  LE Dressing Deficit: Don/doff R sock, Don/doff L sock  Toileting Assistance with Device: Moderate  Toileting Deficit: Clothing management down, Clothing management up    Activity Tolerance:  Endurance: Decreased tolerance for upright activites  Activity Tolerance Comments: on 3L O2 via NC; typically only wears O2 at nighttime    Bed Mobility/Transfers:   Bed Mobility  Bed Mobility: Yes  Bed Mobility 1  Bed Mobility 1: Supine to sitting  Level of Assistance 1: Close supervision  Bed Mobility Comments 1: HOB elevated    Transfers  Transfer: Yes  Transfer 1  Transfer From 1: Bed to  Transfer to 1: Stand  Technique 1: Sit to stand  Transfer Device 1: Walker  Transfer Level of Assistance 1: Contact guard  Trials/Comments 1: with cues for walker mgmt  Transfers 2  Transfer From 2: Bed to  Transfer to 2: Commode-standard  Technique 2: Stand pivot  Transfer Device 2: Walker  Transfer Level of Assistance 2: Contact guard  Trials/Comments 2: for safety/stability  Transfers 3  Transfer From 3: Stand to  Transfer to 3: Chair with arms  Technique 3: Stand to sit  Transfer Device 3: Walker  Transfer Level of Assistance 3: Contact guard  Trials/Comments 3: for safe/controlled descent      Functional Mobility:  Functional Mobility  Functional Mobility Performed: Yes  Functional Mobility 1  Surface 1: Level tile  Device 1: Rolling walker  Assistance 1: Contact guard  Comments 1: ambulates around hospital room today with FWW and CGA for stability, no overt LOB but does require cues for safety and advancement of " activity    Vision:Vision - Basic Assessment  Current Vision: Wears glasses only for reading  Sensation:  Light Touch: No apparent deficits (denies numbness/tingling)  Strength:  Strength Comments: BUEs at least >/= 3/5 observed in function  Hand Function:  Hand Function  Gross Grasp: Functional  Coordination: Functional  Extremities: RUE   RUE : Within Functional Limits and LUE   LUE: Within Functional Limits    Outcome Measures: [facility] Daily Activity  Putting on and taking off regular lower body clothing: A little  Bathing (including washing, rinsing, drying): A little  Putting on and taking off regular upper body clothing: A little  Toileting, which includes using toilet, bedpan or urinal: A little  Taking care of personal grooming such as brushing teeth: A little  Eating Meals: A little  Daily Activity - Total Score: 18      Education Documentation  Body Mechanics, taught by Roc Segundo OT at 4/30/2024 11:28 AM.  Learner: Patient  Readiness: Acceptance  Method: Explanation  Response: Needs Reinforcement    Precautions, taught by Roc Segundo OT at 4/30/2024 11:28 AM.  Learner: Patient  Readiness: Acceptance  Method: Explanation  Response: Needs Reinforcement    ADL Training, taught by Roc Segundo OT at 4/30/2024 11:28 AM.  Learner: Patient  Readiness: Acceptance  Method: Explanation  Response: Needs Reinforcement    Education Comments  No comments found.        OP EDUCATION:  Education  Individual(s) Educated: Patient  Education Provided: Fall precautons, POC discussed and agreed upon, Risk and benefits of OT discussed with patient or other  Risk and Benefits Discussed with Patient/Caregiver/Other: yes  Patient/Caregiver Demonstrated Understanding: yes  Plan of Care Discussed and Agreed Upon: yes  Patient Response to Education: Patient/Caregiver Verbalized Understanding of Information    Goals:  Encounter Problems       Encounter Problems (Active)       OT Goals       ADLS (Progressing)        Start:  04/30/24    Expected End:  05/14/24       Patient will complete ADLS at mod I using AE/compensatory techniques as needed.         Functional Mobility (Progressing)       Start:  04/30/24    Expected End:  05/14/24       Patient will complete household distance mobility at MOD I using LRD.          UE Strengthening (Progressing)       Start:  04/30/24    Expected End:  05/14/24       Patient will improve UE strength to 4/5 in preparation for ADLs/functional activities.

## 2024-04-30 NOTE — NURSING NOTE
Assumed care of pt she is resting in bed @ this time no respiratory distress noted no c/o pain or discomfort . Bed alarm set side rails up x 2

## 2024-04-30 NOTE — ED PROVIDER NOTES
The patient was seen in conjunction with the advanced practice provider, and I performed a substantive portion of the visit. I personally saw the patient and made/approved the management plan and take responsibility for the patient management. All medical decision making was performed by me. All laboratory studies, radiology, and EKGs were reviewed by me.     History: 68-year-old female with multiple comorbidities including end-stage renal disease on hemodialysis presents for dialysis catheter replacement.  Had a fall several days ago reports hit her arm and leg on the chair but did not want to seek treatment thinking that her wounds would heal on their own and she has been in and out of the hospital significantly recently did not want to come back.  She last had dialysis on Thursday of last week, 5 days ago and went to her dialysis on Saturday with dialysis catheter in her hand stating that it had dislodged.  No active bleeding but ultimately today was referred back here for catheter replacement.  Physical exam:  Constitutional:  Awake, alert, well appearing, nontoxic  HEENT:  Normocephalic, atraumatic  Neck: Trachea midline, no stridor  Respiratory/Chest: No respiratory distress, speaking full sentences, clear to auscultation bilaterally, no wheezes, rhonchi, or rales  CV:  Regular rate and regular rhythm, no murmurs, gallops, or rubs  Neuro: A/O, normal speech  Skin:  Warm and dry, large gaping and somewhat old appearing laceration over the anterior mid lower leg with evidence of poor wound healing and secondary infection surrounding erythema, 2+ DP/PT pulses, she also has several other wounds noted including the right distal forearm with a superficial skin tear    MDM: 66-year-old female with end-stage renal disease on hemodialysis presents for fall with several wounds as well as dislodgment of her dialysis catheter.  Right leg wound does appear secondarily infected started on antibiotics for suspected  cellulitis.  Labs obtained without significant abnormality aside from creatinine of 3.6, normal potassium however given she has not had dialysis for 4 days will likely require dialysis soon and will need to replace dialysis catheter.  X-ray of areas of affected extremities with no acute fractures on my independent interpretation including x-ray of the right forearm, right shoulder, right tib-fib.  Chest x-ray on my independent interpretation with mild vascular congestion and cardiomegaly but no other acute cardiopulmonary process.  Ultrasound negative for DVT.  Consulted Dr. Mitchell, patient's nephrologist who agrees with plan of care likely will need transfer to [place] for dialysis catheter placement.  Unfortunately, [facility] is [facility] with no bed availability for transfer for surgical placement therefore it interventional radiology was consulted so patient can be placed on the schedule for tomorrow.  Did discuss with Dr. Conrad who confirmed process for IR can be transfer for procedure and then transfer back to [facility] to receive dialysis here upon admission.  Admitted to hospitalist for further management.         Roopa Lujan MD  04/29/24 1048

## 2024-04-30 NOTE — PROGRESS NOTES
"Daphne Morris is a 68 y.o. female on day 1 of admission presenting with Cellulitis of right lower extremity.    Subjective   Pt seen and examined.  No documented current/events overnight from nursing.  Patient sitting up in the chair just finished eating.  She voices no complaints.       Objective     Physical Exam  Constitutional:       General: She is not in acute distress.     Appearance: She is ill-appearing. She is not toxic-appearing.   Cardiovascular:      Rate and Rhythm: Normal rate and regular rhythm.      Pulses: Normal pulses.      Heart sounds: Normal heart sounds.   Pulmonary:      Effort: Pulmonary effort is normal.      Breath sounds: Normal breath sounds.   Abdominal:      General: Bowel sounds are normal.      Palpations: Abdomen is soft.   Musculoskeletal:         General: Normal range of motion.   Skin:     General: Skin is warm and dry.   Neurological:      General: No focal deficit present.      Mental Status: She is alert and oriented to person, place, and time.   Psychiatric:         Mood and Affect: Mood normal.         Behavior: Behavior normal.         Last Recorded Vitals  Blood pressure 169/56, pulse 59, temperature 36.3 °C (97.3 °F), temperature source Oral, resp. rate 18, height 1.676 m (5' 6\"), weight 75.8 kg (167 lb), SpO2 94%.  Intake/Output last 3 Shifts:  I/O last 3 completed shifts:  In: 300 (4 mL/kg) [IV Piggyback:300]  Out: - (0 mL/kg)   Weight: 75.7 kg     Relevant Results  Scheduled medications  amiodarone, 200 mg, oral, Daily with breakfast  aspirin, 81 mg, oral, Daily  atorvastatin, 40 mg, oral, Daily  cephalexin, 500 mg, oral, q8h SUMIT  clopidogrel, 75 mg, oral, Daily  heparin, 5,000 Units, subcutaneous, q12h  insulin glargine, 14 Units, subcutaneous, Nightly  insulin lispro, 0-5 Units, subcutaneous, q4h  levothyroxine, 200 mcg, oral, Daily before breakfast  levothyroxine, 50 mcg, oral, Daily before breakfast  pantoprazole, 40 mg, oral, BID  venlafaxine XR, 150 mg, oral, " Daily      Continuous medications     PRN medications  PRN medications: dextrose, dextrose, glucagon, glucagon     following data reviewed        Na-135  K+-4.9  Cl-102  Bicarb-19  BUN-57  creatinine-3.6                              Assessment/Plan   Principal Problem:    Cellulitis of right lower extremity  Active Problems:    Unspecified open wound, right lower leg, initial encounter    Chronic kidney disease, stage V (Multi)    Open wound of right forearm    Open wound of right upper arm    Open wound of left lower leg    Abrasion of buttock, infected    Acute cystitis with hematuria    Dislodged Dialysis Catheter/ESRD on dialysis  -IR will place cath 5/1  -nephrology following  -will receive dialysis when cath placed    Fall  -fall precautions  -PT, OT    DM II  -monitor blood glucose  -ISS    Plan  Above plan discussed with pt and she is in agreement    CARLENE Asif-CNP

## 2024-04-30 NOTE — H&P
History Of Present Illness  Daphne Morris is a 68 y.o. female presenting with a fall and the accidental removal of her dialysis catheter    She was discharged from a rehab facility 2 days ago and somehow she tripped and fell.  She has suffered a significant gash in her right shin and somehow her tunneled dialysis catheter that was in her right subclavian has come completely out.  Today is Tuesday.  She has not been dialyzed since last Thursday.  She feels other than the fall and the loss of her dialysis access she is doing fine     Past Medical History  She has a past medical history of Anal fissure (10/12/2023), Anemia, ASHD (arteriosclerotic heart disease), At risk for falls, Atrial fibrillation (Multi), CHF (congestive heart failure) (Multi), CKD (chronic kidney disease), COPD (chronic obstructive pulmonary disease) (Multi), CVA (cerebral vascular accident) (Multi), Depression, Diabetes mellitus (Multi), GERD (gastroesophageal reflux disease), H/O pleural effusion, Hyperlipidemia, Hypertension, Hypothyroidism, Hypoxia, Impacted cerumen, left ear, Noncompliance, Osteoarthritis, Perianal dermatitis (10/12/2023), Personal history of other diseases of the respiratory system, PVD (peripheral vascular disease) (CMS-HCC), Renal carcinoma, right (Multi), Respiratory failure (Multi), TIA (transient ischemic attack), Vasculitis (CMS-[facility]) (10/12/2023), and Weakness.    Surgical History  She has a past surgical history that includes MR angio chest w and wo IV contrast (2023); MR angio head wo IV contrast (2021);  section, classic; Shoulder surgery; Ankle surgery; Cataract extraction (Right); Nephrectomy (2021); and Cardiac catheterization (N/A, 2024).     Social History  She reports that she has quit smoking. Her smoking use included cigarettes. She started smoking about 55 years ago. She has a 27.7 pack-year smoking history. She has never been exposed to tobacco smoke. She has never used  smokeless tobacco. She reports that she does not currently use alcohol. She reports current drug use. Drug: Marijuana.    Family History  Family History   Problem Relation Name Age of Onset    Hypertension Mother      Diabetes Father      Heart disease Father      Cancer Sister      Cancer Brother      Diabetes Daughter      Asthma Daughter      Heart attack Daughter      Diabetes Maternal Grandfather      Heart disease Paternal Grandmother      Diabetes Other      Hypertension Other      COPD Other      Other (chronic lung disease) Other          Allergies  Bee venom protein (honey bee), Citalopram, Codeine, Influenza virus vaccines, Latex, Lisinopril, Adhesive tape-silicones, Amoxicillin, Doxycycline, Oseltamivir, Phenyleph-min oil-petrolatum, Phenyleph-shark oil-glyc-pet, Phenylephrine, Prednisone, Tuberculin ppd, and Xylometazoline    Review of Systems see HPI for pertinent positives and negatives otherwise 10 point review of systems negative     Physical Exam  Third oriented undistressed  Head atraumatic normocephalic  Pupils equal round reactive light extraocular motion intact  Mouth normal-appearing tongue and oropharynx  Neck supple without thyromegaly  Lymph nodes no cervical or axillary lymphadenopathy  Heart regular rate and rhythm without murmurs rubs or gallops  Lungs clear to auscultation normal to percussion  Abdomen soft nontender nondistended  Extremities 1+ pitting bilateral leg edema and her right shin there is an area about 2 x 3 inches where it seems she is missing a chunk of skin perhaps a centimeter deep.  There is minimal erythema on both legs.  Neuro cranial nerves intact with good tone and strength in arms and legs  Musculoskeletal normal passive range of motion shoulders elbows hips knees  Last Recorded Vitals  /82 (BP Location: Left arm, Patient Position: Sitting)   Pulse 68   Temp 36.3 °C (97.3 °F) (Temporal)   Resp 18   Wt 75.8 kg (167 lb)   SpO2 96%     Relevant Results        Assessment/Plan   #1-loss of dialysis access and she is behind on her dialysis.  We have put in a consult for interventional radiology.  We do not actually do IR procedures here at [facility] but I am told that she can be shipped to [facility] get her procedure done and come back here.  She needs a tunneled dialysis catheter.  She is n.p.o. in case that is what they need.  This really needs to get done today as she is behind on her dialysis.  I am consulting her nephrologist Dr. Mitchell    2-injury to her right shin -there is nothing to suture or sew back together at this point.  It is going to have to heal by secondary intention.  I have ordered some basic wound care consulted wound care nurse and I have ordered Keflex I do not think cellulitis is the major problem.            Silver Levi MD

## 2024-04-30 NOTE — CONSULTS
Wound Care Consult     Visit Date: 4/30/2024      Patient Name: Daphne Morris         MRN: [id number]           YOB: 1955    Consulted for wound care. A 68 y.o. female patient admitted for   Chronic kidney disease, stage V (Multi) [N18.5]  Acute cystitis with hematuria [N30.01]  Cellulitis of right lower extremity [L03.115]  Open wound of left lower leg, initial encounter [S81.802A]  Infected abrasion of buttock, initial encounter [S30.810A, L08.9]  Open wound of right forearm, initial encounter [S51.801A]  Open wound of right upper arm, initial encounter [S41.101A]  Unspecified open wound, right lower leg, initial encounter [S81.801A]   Past Medical History:   Diagnosis Date    Anal fissure 10/12/2023    Anemia     ASHD (arteriosclerotic heart disease)     At risk for falls     Atrial fibrillation (Multi)     CHF (congestive heart failure) (Multi)     CKD (chronic kidney disease)     COPD (chronic obstructive pulmonary disease) (Multi)     CVA (cerebral vascular accident) (Multi)     2021- right-sided weakness    Depression     Diabetes mellitus (Multi)     GERD (gastroesophageal reflux disease)     H/O pleural effusion     Hyperlipidemia     Hypertension     Hypothyroidism     Hypoxia     Impacted cerumen, left ear     Impacted cerumen of left ear    Noncompliance     Osteoarthritis     Perianal dermatitis 10/12/2023    Personal history of other diseases of the respiratory system     History of acute bronchitis    PVD (peripheral vascular disease) (CMS-HCC)     Renal carcinoma, right (Multi)     s/p partial nephrectomy 8/2021    Respiratory failure (Multi)     TIA (transient ischemic attack)     Vasculitis (CMS-HCC) 10/12/2023    Weakness       Past Surgical History:   Procedure Laterality Date    ANKLE SURGERY      2013    CARDIAC CATHETERIZATION N/A 2/7/2024    Procedure: Right Heart Cath;  Surgeon: Nicholas Antonio MD;  Location: [facility] Cardiac Cath Lab;  Service: Cardiovascular;  Laterality:  N/A;    CATARACT EXTRACTION Right     2019     SECTION, CLASSIC      MR CHEST ANGIO W AND WO IV CONTRAST  2023    MR CHEST ANGIO W AND WO IV CONTRAST 2023 [facility]    MR HEAD ANGIO WO IV CONTRAST  2021    MR HEAD ANGIO WO IV CONTRAST [facility]    NEPHRECTOMY  2021    SHOULDER SURGERY            Scheduled medications  amiodarone, 200 mg, oral, Daily with breakfast  aspirin, 81 mg, oral, Daily  atorvastatin, 40 mg, oral, Daily  cephalexin, 500 mg, oral, q8h SUMIT  clopidogrel, 75 mg, oral, Daily  heparin, 5,000 Units, subcutaneous, q12h  insulin glargine, 14 Units, subcutaneous, Nightly  insulin lispro, 0-5 Units, subcutaneous, TID with meals  levothyroxine, 200 mcg, oral, Daily before breakfast  levothyroxine, 50 mcg, oral, Daily before breakfast  pantoprazole, 40 mg, oral, BID  venlafaxine XR, 150 mg, oral, Daily      Continuous medications     PRN medications  PRN medications: acetaminophen **OR** acetaminophen **OR** acetaminophen, dextrose, dextrose, glucagon, glucagon     Allergies   Allergen Reactions    Bee Venom Protein (Honey Bee) Swelling    Citalopram Other, Rash and Hives     Facial breakout, blisters, and rash    Codeine Other and Headache     Migraines    Influenza Virus Vaccines Hives    Latex Other     blisters    Lisinopril Swelling     Facial swelling    Adhesive Tape-Silicones Other     blisters    Amoxicillin Swelling    Doxycycline Rash    Oseltamivir Rash    Phenyleph-Min Oil-Petrolatum Other    Phenyleph-Shark Oil-Glyc-Pet Rash    Phenylephrine Nausea/vomiting    Prednisone Other     Yeast infection    Tuberculin Ppd Unknown    Xylometazoline Unknown        Susceptibility data from last 90 days.  Collected Specimen Info Organism   24 Swab from Anterior Nares Methicillin Resistant Staphylococcus aureus (MRSA)          Pertinent Labs:   [place], Urine   Date Value Ref Range Status   07/15/2020 1,714 (H) 0 - 23 MG/L Final     Comment:      RECHECKED DILUTED  [facility] [address] [facility] [address]       Albumin   Date Value Ref Range Status   04/30/2024 2.6 (L) 3.5 - 5.0 g/dL Final     Albumin, Urine Random   Date Value Ref Range Status   02/01/2024 >2,250.0 Not established mg/L Final       Wound Assessment:  Reason for Consult: consulted for wound evaluation and wound care recommendations       Wound History: Fall at home, traumatic injuries to bilateral anterior calves, right arm, right wrist and buttocks    Wound Team Assessment: Patient sitting in bedside chair, waffle cushion in place by therapy, stood with therapy for sacral exam, sacrum is not red, buttock have bruising from fall, no open areas.   Right upper arm wound measures 2x5 cm skin tear reapproximated to best ability, no drainage and mepitel one in place.     Right wrist skin tear measures 1x3cm, mepitel one in place, no drainage at this time.     Right anterior calf has a traumatic avulsion wound measures 3.5x5x0.6cm, see photo below, moderate serous drainage on dressing. Removed old dressing, washed with soap/water, rinsed patted dry, applied xeroform, ABD and wrapped with kerlix securing with tape.     Left traumatic anterior calf wound measures 2x3x0.4cm, see photo below, washed with soap/water rinsed, patted dry, applied xeroform, ABD, wrapped in kerlix and secured with tape.    Sacrum is not red and intact. Buttock has bruising from fall that is intact and not fluctuant.   Multiple scattered small bruised noted, left open to air and skin is intact.     Wound Team Plan: See recommendations above for each wound.        Patient is on a Centrella Bed System, no waffle mattress in place, Last Ramon Score is 15. Nursing to reassess and place waffle if she qualified. Waffle cushion in chair placed by therapy. Anahi Parker RN bedside nurse updated, continue pressure injury prevention interventions and nursing to continue to follow provider orders. Re consult wound  RN PRN.    Carolina BRANNONN RN WCC OMS  4/30/2024  3:40 PM

## 2024-04-30 NOTE — TELEPHONE ENCOUNTER
Patient admitted to [facility]. Per ED note: Presents for fall with several wounds as well as dislodgement of her dialysis catheter.  Rt leg wound does appear secondarily infected, started on antibiotics for suspected cellulitis. She has not had dialysis for 4 days will likely require dialysis soon and will need to replace dialysis catheter.  Consulted Dr. Mitchell, patient's nephrologist who agrees with POC, likely will need transfer to [place] for dialysis catheter placement.

## 2024-04-30 NOTE — PROGRESS NOTES
"   04/30/24 5100   Discharge Planning   Living Arrangements Alone   Support Systems Family members;Friends/neighbors;[employer] community   Assistance Needed Requires some assistance due to weakness; has only been home from SNF for <1 week. reports \"too many falls to count\" in the last 6months. has a neighbor and friends who can check in daily but are unable to stay with pt   Type of Residence Private residence  (Mobile home  Lives With: Alone One level  Home Access: Stairs to enter with rails  Entrance Stairs-Rails: Right  Entrance Stairs-Number of Steps: 5)   Number of Stairs to Enter Residence 5   Number of Stairs Within Residence 0   Do you have animals or pets at home? Yes   Type of Animals or Pets cat   Home or Post Acute Services In home services   Type of Home Care Services Home nursing visits;Home PT;Home OT   Patient expects to be discharged to: Home with Home Health Care   Does the patient need discharge transport arranged? No   Financial Resource Strain   How hard is it for you to pay for the very basics like food, housing, medical care, and heating? Not hard   Housing Stability   In the last 12 months, was there a time when you were not able to pay the mortgage or rent on time? N   In the last 12 months, was there a time when you did not have a steady place to sleep or slept in a shelter (including now)? N   Transportation Needs   In the past 12 months, has lack of transportation kept you from medical appointments or from getting medications? no  (family and friends help with transportation)   In the past 12 months, has lack of transportation kept you from meetings, work, or from getting things needed for daily living? No     Daphne Morris is a 68 y.o. female presenting with a fall and the accidental removal of her dialysis catheter     She was discharged from a [facility] rehab facility 2 days ago (She was at [facility] for [facility] months) and somehow she tripped and fell.  She has suffered a significant gash " "in her right shin and somehow her tunneled dialysis catheter that was in her right subclavian has come completely out.  Today is Tuesday.  She has not been dialyzed since last Thursday.  She feels other than the fall and the loss of her dialysis access she is doing fine    Patient lives alone in a mobile home, one level with 5 steps to enter with rails  Entrance Stairs-Rails: Right. Home Adaptive Equipment: Walker rolling or standard, Other (Comment), Wheelchair-manual (rollator). Patient has only been home from SNF for <1 week. reports \"too many falls to count\" in the last 6months. Has a neighbor and friends who can check in daily but are unable to stay with pt. Also has family and [place] friends that check up on her. Patient unable to do house chores, cooking and shopping, usually her neighbors help with the cleaning and nephew/niece help with the cooking and shopping. Patient cannot stand up very long to do any activity. Hearing/Visual Limitations: [place], reading glasses.  Medical Precautions: Fall precautions, Oxygen therapy device and L/min (3L O2).  Patient has dialysis on Tues-Thurs-Sat at [facility] in [place] at 630 AM and her nephew takes her. Patient does have a life alert.     PLAN: Discharge to home with home health care, will need a referral.             Dialysis at [facility] in [place] on TUES-THURS-SAT at 630 AM. Will need a referral.   "

## 2024-04-30 NOTE — PROGRESS NOTES
04/30/24 1800   Physical Activity   On average, how many days per week do you engage in moderate to strenuous exercise (like a brisk walk)? 0 days   On average, how many minutes do you engage in exercise at this level? 0 min   Financial Resource Strain   How hard is it for you to pay for the very basics like food, housing, medical care, and heating? Not hard   Housing Stability   In the last 12 months, was there a time when you were not able to pay the mortgage or rent on time? N   In the last 12 months, was there a time when you did not have a steady place to sleep or slept in a shelter (including now)? N   Transportation Needs   In the past 12 months, has lack of transportation kept you from medical appointments or from getting medications? no   In the past 12 months, has lack of transportation kept you from meetings, work, or from getting things needed for daily living? No   Stress   Do you feel stress - tense, restless, nervous, or anxious, or unable to sleep at night because your mind is troubled all the time - these days? Not at all   Social Connections   In a typical week, how many times do you talk on the phone with family, friends, or neighbors? More than 3   How often do you get together with friends or relatives? More than 3   How often do you attend Sikh or Yarsanism services? More than 4   Do you belong to any clubs or organizations such as Sikh groups, unions, fraternal or athletic groups, or school groups? No   How often do you attend meetings of the clubs or organizations you belong to? Never   Intimate Partner Violence   Within the last year, have you been afraid of your partner or ex-partner? No   Within the last year, have you been humiliated or emotionally abused in other ways by your partner or ex-partner? No   Within the last year, have you been kicked, hit, slapped, or otherwise physically hurt by your partner or ex-partner? No   Within the last year, have you been raped or forced to  have any kind of sexual activity by your partner or ex-partner? No   Alcohol Use   Q1: How often do you have a drink containing alcohol? Never   Q2: How many drinks containing alcohol do you have on a typical day when you are drinking? None   Q3: How often do you have six or more drinks on one occasion? Never   Utilities   In the past 12 months has the electric, gas, oil, or water company threatened to shut off services in your home? No   Health Literacy   How often do you need to have someone help you when you read instructions, pamphlets, or other written material from your doctor or pharmacy? Never     She reports that she has quit smoking. Her smoking use included cigarettes. She started smoking about 55 years ago. She has a 27.7 pack-year smoking history. She has never been exposed to tobacco smoke. She has never used smokeless tobacco. She reports that she does not currently use alcohol. She reports current drug use. Drug: Marijuana.

## 2024-04-30 NOTE — PROGRESS NOTES
04/30/24 1739   [facility] Disability Status   Are you deaf or do you have serious difficulty hearing? N   Are you blind or do you have serious difficulty seeing, even when wearing glasses? Y   Because of a physical, mental, or emotional condition, do you have serious difficulty concentrating, remembering, or making decisions? (5 years old or older) N   Do you have serious difficulty walking or climbing stairs? Y   Do you have serious difficulty dressing or bathing? N   Because of a physical, mental, or emotional condition, do you have serious difficulty doing errands alone such as visiting the doctor? Y

## 2024-04-30 NOTE — PROGRESS NOTES
Physical Therapy    Physical Therapy Evaluation & Treatment    Patient Name: Daphne Morris  MRN: [id number]  Today's Date: 4/30/2024   Time Calculation  Start Time: 1342  Stop Time: 1406  Time Calculation (min): 24 min    Assessment/Plan   PT Assessment  PT Assessment Results: Decreased strength, Decreased endurance, Impaired balance, Decreased mobility, Decreased safety awareness, Decreased skin integrity  Rehab Prognosis: Good  Evaluation/Treatment Tolerance: Patient tolerated treatment well  Strengths: Premorbid level of function  Barriers to Participation: Comorbidities  Assessment Comment: Pt is a 68 y.o. female adm for fall during which her dialysis catheter became dislodged. Pt recently home from rehab, reports has h/o falls. Pt moving around room w/RW, CGA, no LOB or dizziness. Pt would benefit from continued skilled PT services to maximize safe funcitonal mobility  End of Session Patient Position: Up in chair, Alarm on   IP OR SWING BED PT PLAN  Inpatient or Swing Bed: Inpatient  PT Plan  Treatment/Interventions: Bed mobility, Transfer training, Gait training, Stair training, Strengthening, Endurance training, Therapeutic exercise, Therapeutic activity  PT Plan: Skilled PT  PT Frequency: 4 times per week  PT Discharge Recommendations: Low intensity level of continued care  Equipment Recommended upon Discharge: Wheeled walker  PT Recommended Transfer Status: Assist x1, Assistive device  PT - OK to Discharge: Yes      Subjective     General Visit Information:  General  Reason for Referral: impaired mobility  Referred By: Dr. Levi  Past Medical History Relevant to Rehab: COPD, hypothyroidism, DM, HTN, ESRD (dialysis Tu, Th, Sa), renal cancer s/p nephrectomy, anemia, a-fib, CHF, COPD, CVA, PVD  Family/Caregiver Present: No  Patient Position Received: Bed, 3 rail up, Alarm on  Preferred Learning Style: verbal, visual  General Comment: Pt agreeable to PT eval.  Home Living:  Home Living  Type of Home: Mobile  "home  Lives With: Alone  Home Adaptive Equipment: Walker rolling or standard, Other (Comment), Wheelchair-manual (rollator)  Home Layout: One level  Home Access: Stairs to enter with rails  Entrance Stairs-Rails: Right  Entrance Stairs-Number of Steps: 5  Prior Level of Function:  Prior Function Per Pt/Caregiver Report  Level of Vass: Independent with ADLs and functional transfers, Independent with homemaking with ambulation  Receives Help From: Friends, Neighbor  ADL Assistance: Independent  Homemaking Assistance: Independent  Ambulatory Assistance: Independent (rollator)  Prior Function Comments: has only been home from SNF for <1 week. reports \"too many falls to count\" in the last 6months. has a neighbor and friends who can check in daily but are unable to stay with pt  Precautions:  Precautions  Hearing/Visual Limitations: [place], reading glasses  Medical Precautions: Fall precautions, Oxygen therapy device and L/min (3L O2)  Vital Signs:  Vital Signs  Heart Rate: 60  Heart Rate Source: Monitor  SpO2: 91 %  Objective   Pain:  Pain Assessment  Pain Assessment: 0-10  Pain Score: 0 - No pain  Cognition:  Cognition  Overall Cognitive Status: Within Functional Limits  Insight: Mild    General Assessments:  General Observation  General Observation: mod edema RUE     Activity Tolerance  Activity Tolerance Comments: Fair    Sensation  Sensation Comment: pt reports abn sensation at feet    Strength  Strength Comments: BLEs 3/5 or >     Dynamic Standing Balance  Dynamic Standing-Balance Support: Bilateral upper extremity supported (RW)  Dynamic Standing-Balance:  (amb, turns)  Dynamic Standing-Comments: Fair, no LOB  Functional Assessments:  Bed Mobility  Bed Mobility: No    Transfer 1  Technique 1: Sit to stand, Stand to sit  Transfer Device 1: Walker  Transfer Level of Assistance 1: Minimum assistance  Trials/Comments 1: from /to chair w/ arms; required effort. Performed x 3, waffle cushion and blankets placed in " chair for comfort and increased ease of standing.    Ambulation/Gait Training  Ambulation/Gait Training Performed: Yes  Ambulation/Gait Training 1  Surface 1: Level tile  Device 1: Rolling walker  Assistance 1: Contact guard  Comments/Distance (ft) 1: Pt amb 10' x 1, 25' x 1, no LOB; pt took off O2 for extended amb, SpO2 down to 88%, up to 91% at rest. Pt denied dizziness.    Extremity/Trunk Assessments:  RLE   RLE : Within Functional Limits  LLE   LLE : Within Functional Limits  Treatments:  Ambulation/Gait Training  Ambulation/Gait Training Performed: Yes  Ambulation/Gait Training 1  Surface 1: Level tile  Device 1: Rolling walker  Assistance 1: Contact guard  Comments/Distance (ft) 1: Pt amb 10' x 1, 25' x 1, no LOB; pt took off O2 for extended amb, SpO2 down to 88%, up to 91% at rest. Pt denied dizziness.  Transfer 1  Technique 1: Sit to stand, Stand to sit  Transfer Device 1: Walker  Transfer Level of Assistance 1: Minimum assistance  Trials/Comments 1: from /to chair w/ arms; required effort. Performed x 3, waffle cushion and blankets placed in chair for comfort and increased ease of standing.  Outcome Measures:  [facility] Basic Mobility  Turning from your back to your side while in a flat bed without using bedrails: A little  Moving from lying on your back to sitting on the side of a flat bed without using bedrails: A little  Moving to and from bed to chair (including a wheelchair): A little  Standing up from a chair using your arms (e.g. wheelchair or bedside chair): A little  To walk in hospital room: A little  Climbing 3-5 steps with railing: A lot  Basic Mobility - Total Score: 17    Encounter Problems       Encounter Problems (Active)       Balance       LTG - Patient will maintain balance to allow for safe mobility (Progressing)       Start:  04/30/24    Expected End:  05/08/24               Mobility       LTG - Patient will navigate 4-6 steps with 1 rails/device and supervision  (Progressing)       Start:   04/30/24    Expected End:  05/08/24            STG - Patient will ambulate 75' w/ RW and supervision  (Progressing)       Start:  04/30/24    Expected End:  05/08/24            Pt will tolerate BLE exercises to promote functional strength and endurance (Progressing)       Start:  04/30/24    Expected End:  05/08/24               PT Transfers       STG - Patient will transfer sit to and from stand DAPHNE (Progressing)       Start:  04/30/24    Expected End:  05/08/24                   Education Documentation  Mobility Training, taught by Cassidy Uribe, PT at 4/30/2024  3:56 PM.  Learner: Patient  Readiness: Acceptance  Method: Explanation  Response: Verbalizes Understanding    Education Comments  No comments found.

## 2024-05-01 ENCOUNTER — HOSPITAL ENCOUNTER (INPATIENT)
Dept: CARDIOLOGY | Facility: HOSPITAL | Age: 69
Discharge: HOME | End: 2024-05-01
Payer: MEDICARE

## 2024-05-01 ENCOUNTER — APPOINTMENT (OUTPATIENT)
Dept: DIALYSIS | Facility: HOSPITAL | Age: 69
End: 2024-05-01
Payer: MEDICARE

## 2024-05-01 VITALS
OXYGEN SATURATION: 100 % | SYSTOLIC BLOOD PRESSURE: 155 MMHG | RESPIRATION RATE: 16 BRPM | HEART RATE: 69 BPM | DIASTOLIC BLOOD PRESSURE: 70 MMHG

## 2024-05-01 LAB
ALBUMIN SERPL-MCNC: 2.4 G/DL (ref 3.5–5)
ANION GAP SERPL CALC-SCNC: 13 MMOL/L
BACTERIA UR CULT: NORMAL
BUN SERPL-MCNC: 63 MG/DL (ref 8–25)
CALCIUM SERPL-MCNC: 8.1 MG/DL (ref 8.5–10.4)
CHLORIDE SERPL-SCNC: 102 MMOL/L (ref 97–107)
CO2 SERPL-SCNC: 22 MMOL/L (ref 24–31)
CREAT SERPL-MCNC: 3.6 MG/DL (ref 0.4–1.6)
EGFRCR SERPLBLD CKD-EPI 2021: 13 ML/MIN/1.73M*2
ERYTHROCYTE [DISTWIDTH] IN BLOOD BY AUTOMATED COUNT: 15.9 % (ref 11.5–14.5)
GLUCOSE BLD MANUAL STRIP-MCNC: 115 MG/DL (ref 74–99)
GLUCOSE BLD MANUAL STRIP-MCNC: 135 MG/DL (ref 74–99)
GLUCOSE BLD MANUAL STRIP-MCNC: 89 MG/DL (ref 74–99)
GLUCOSE SERPL-MCNC: 124 MG/DL (ref 65–99)
HCT VFR BLD AUTO: 33.6 % (ref 36–46)
HGB BLD-MCNC: 9.8 G/DL (ref 12–16)
MCH RBC QN AUTO: 28.8 PG (ref 26–34)
MCHC RBC AUTO-ENTMCNC: 29.2 G/DL (ref 32–36)
MCV RBC AUTO: 99 FL (ref 80–100)
NRBC BLD-RTO: 0 /100 WBCS (ref 0–0)
PHOSPHATE SERPL-MCNC: 7.5 MG/DL (ref 2.5–4.5)
PLATELET # BLD AUTO: 258 X10*3/UL (ref 150–450)
POTASSIUM SERPL-SCNC: 4.7 MMOL/L (ref 3.4–5.1)
RBC # BLD AUTO: 3.4 X10*6/UL (ref 4–5.2)
SODIUM SERPL-SCNC: 137 MMOL/L (ref 133–145)
WBC # BLD AUTO: 7.8 X10*3/UL (ref 4.4–11.3)

## 2024-05-01 PROCEDURE — 36558 INSERT TUNNELED CV CATH: CPT | Performed by: RADIOLOGY

## 2024-05-01 PROCEDURE — 1100000001 HC PRIVATE ROOM DAILY

## 2024-05-01 PROCEDURE — C1750 CATH, HEMODIALYSIS,LONG-TERM: HCPCS

## 2024-05-01 PROCEDURE — 76937 US GUIDE VASCULAR ACCESS: CPT | Performed by: RADIOLOGY

## 2024-05-01 PROCEDURE — 2500000005 HC RX 250 GENERAL PHARMACY W/O HCPCS: Performed by: RADIOLOGY

## 2024-05-01 PROCEDURE — 80069 RENAL FUNCTION PANEL: CPT | Performed by: NURSE PRACTITIONER

## 2024-05-01 PROCEDURE — 87340 HEPATITIS B SURFACE AG IA: CPT | Mod: TRILAB,WESLAB | Performed by: NURSE PRACTITIONER

## 2024-05-01 PROCEDURE — 2720000007 HC OR 272 NO HCPCS

## 2024-05-01 PROCEDURE — C1894 INTRO/SHEATH, NON-LASER: HCPCS

## 2024-05-01 PROCEDURE — 8010000001 HC DIALYSIS - HEMODIALYSIS PER DAY

## 2024-05-01 PROCEDURE — 2500000004 HC RX 250 GENERAL PHARMACY W/ HCPCS (ALT 636 FOR OP/ED): Performed by: RADIOLOGY

## 2024-05-01 PROCEDURE — 2500000004 HC RX 250 GENERAL PHARMACY W/ HCPCS (ALT 636 FOR OP/ED): Performed by: INTERNAL MEDICINE

## 2024-05-01 PROCEDURE — 2500000001 HC RX 250 WO HCPCS SELF ADMINISTERED DRUGS (ALT 637 FOR MEDICARE OP): Performed by: NURSE PRACTITIONER

## 2024-05-01 PROCEDURE — 7100000009 HC PHASE TWO TIME - INITIAL BASE CHARGE

## 2024-05-01 PROCEDURE — 99152 MOD SED SAME PHYS/QHP 5/>YRS: CPT

## 2024-05-01 PROCEDURE — 77001 FLUOROGUIDE FOR VEIN DEVICE: CPT | Performed by: RADIOLOGY

## 2024-05-01 PROCEDURE — 36415 COLL VENOUS BLD VENIPUNCTURE: CPT | Performed by: NURSE PRACTITIONER

## 2024-05-01 PROCEDURE — 85027 COMPLETE CBC AUTOMATED: CPT | Performed by: NURSE PRACTITIONER

## 2024-05-01 PROCEDURE — 86706 HEP B SURFACE ANTIBODY: CPT | Mod: TRILAB,WESLAB | Performed by: NURSE PRACTITIONER

## 2024-05-01 PROCEDURE — 2500000001 HC RX 250 WO HCPCS SELF ADMINISTERED DRUGS (ALT 637 FOR MEDICARE OP): Performed by: INTERNAL MEDICINE

## 2024-05-01 PROCEDURE — 2500000002 HC RX 250 W HCPCS SELF ADMINISTERED DRUGS (ALT 637 FOR MEDICARE OP, ALT 636 FOR OP/ED): Performed by: NURSE PRACTITIONER

## 2024-05-01 PROCEDURE — 7100000010 HC PHASE TWO TIME - EACH INCREMENTAL 1 MINUTE

## 2024-05-01 PROCEDURE — 99231 SBSQ HOSP IP/OBS SF/LOW 25: CPT | Performed by: NURSE PRACTITIONER

## 2024-05-01 PROCEDURE — 90937 HEMODIALYSIS REPEATED EVAL: CPT

## 2024-05-01 PROCEDURE — 2500000006 HC RX 250 W HCPCS SELF ADMINISTERED DRUGS (ALT 637 FOR ALL PAYERS): Mod: MUE | Performed by: INTERNAL MEDICINE

## 2024-05-01 PROCEDURE — 82947 ASSAY GLUCOSE BLOOD QUANT: CPT | Mod: 91

## 2024-05-01 RX ORDER — HEPARIN SODIUM 1000 [USP'U]/ML
2000 INJECTION, SOLUTION INTRAVENOUS; SUBCUTANEOUS ONCE
Status: COMPLETED | OUTPATIENT
Start: 2024-05-01 | End: 2024-05-01

## 2024-05-01 RX ORDER — HEPARIN SODIUM 1000 [USP'U]/ML
INJECTION, SOLUTION INTRAVENOUS; SUBCUTANEOUS AS NEEDED
Status: DISCONTINUED | OUTPATIENT
Start: 2024-05-01 | End: 2024-05-02 | Stop reason: HOSPADM

## 2024-05-01 RX ORDER — LIDOCAINE HYDROCHLORIDE 10 MG/ML
INJECTION, SOLUTION EPIDURAL; INFILTRATION; INTRACAUDAL; PERINEURAL AS NEEDED
Status: DISCONTINUED | OUTPATIENT
Start: 2024-05-01 | End: 2024-05-02 | Stop reason: HOSPADM

## 2024-05-01 RX ORDER — TRAMADOL HYDROCHLORIDE 50 MG/1
50 TABLET ORAL EVERY 12 HOURS PRN
Status: DISCONTINUED | OUTPATIENT
Start: 2024-05-01 | End: 2024-05-02

## 2024-05-01 RX ORDER — FENTANYL CITRATE 50 UG/ML
INJECTION, SOLUTION INTRAMUSCULAR; INTRAVENOUS AS NEEDED
Status: DISCONTINUED | OUTPATIENT
Start: 2024-05-01 | End: 2024-05-02 | Stop reason: HOSPADM

## 2024-05-01 RX ORDER — MIDAZOLAM HYDROCHLORIDE 1 MG/ML
INJECTION, SOLUTION INTRAMUSCULAR; INTRAVENOUS AS NEEDED
Status: DISCONTINUED | OUTPATIENT
Start: 2024-05-01 | End: 2024-05-02 | Stop reason: HOSPADM

## 2024-05-01 RX ADMIN — AMIODARONE HYDROCHLORIDE 200 MG: 200 TABLET ORAL at 09:03

## 2024-05-01 RX ADMIN — FENTANYL CITRATE 25 MCG: 50 INJECTION, SOLUTION INTRAMUSCULAR; INTRAVENOUS at 13:50

## 2024-05-01 RX ADMIN — MIDAZOLAM 1 MG: 1 INJECTION INTRAMUSCULAR; INTRAVENOUS at 13:49

## 2024-05-01 RX ADMIN — HEPARIN SODIUM 3800 UNITS: 1000 INJECTION INTRAVENOUS; SUBCUTANEOUS at 14:05

## 2024-05-01 RX ADMIN — CEPHALEXIN 500 MG: 500 CAPSULE ORAL at 22:00

## 2024-05-01 RX ADMIN — CEPHALEXIN 500 MG: 500 CAPSULE ORAL at 05:00

## 2024-05-01 RX ADMIN — LIDOCAINE HYDROCHLORIDE 5 ML: 10 INJECTION, SOLUTION EPIDURAL; INFILTRATION; INTRACAUDAL; PERINEURAL at 13:51

## 2024-05-01 RX ADMIN — PANTOPRAZOLE SODIUM 40 MG: 40 TABLET, DELAYED RELEASE ORAL at 09:03

## 2024-05-01 RX ADMIN — HEPARIN SODIUM 1900 UNITS: 1000 INJECTION INTRAVENOUS; SUBCUTANEOUS at 23:52

## 2024-05-01 RX ADMIN — PANTOPRAZOLE SODIUM 40 MG: 40 TABLET, DELAYED RELEASE ORAL at 20:46

## 2024-05-01 RX ADMIN — TRAMADOL HYDROCHLORIDE 50 MG: 50 TABLET ORAL at 18:00

## 2024-05-01 RX ADMIN — INSULIN GLARGINE 14 UNITS: 100 INJECTION, SOLUTION SUBCUTANEOUS at 22:00

## 2024-05-01 RX ADMIN — ACETAMINOPHEN 650 MG: 325 TABLET ORAL at 20:46

## 2024-05-01 RX ADMIN — ACETAMINOPHEN 650 MG: 325 TABLET ORAL at 04:22

## 2024-05-01 RX ADMIN — HEPARIN SODIUM 1900 UNITS: 1000 INJECTION INTRAVENOUS; SUBCUTANEOUS at 23:53

## 2024-05-01 ASSESSMENT — PAIN DESCRIPTION - ORIENTATION
ORIENTATION: RIGHT;LEFT
ORIENTATION: LEFT;RIGHT

## 2024-05-01 ASSESSMENT — COGNITIVE AND FUNCTIONAL STATUS - GENERAL
TURNING FROM BACK TO SIDE WHILE IN FLAT BAD: A LOT
DRESSING REGULAR LOWER BODY CLOTHING: A LOT
HELP NEEDED FOR BATHING: A LOT
TOILETING: A LOT
MOBILITY SCORE: 14
PERSONAL GROOMING: A LOT
STANDING UP FROM CHAIR USING ARMS: A LOT
MOVING FROM LYING ON BACK TO SITTING ON SIDE OF FLAT BED WITH BEDRAILS: A LOT
DAILY ACTIVITIY SCORE: 14
WALKING IN HOSPITAL ROOM: A LOT
DRESSING REGULAR UPPER BODY CLOTHING: A LOT
MOVING TO AND FROM BED TO CHAIR: A LOT

## 2024-05-01 ASSESSMENT — PAIN - FUNCTIONAL ASSESSMENT
PAIN_FUNCTIONAL_ASSESSMENT: 0-10
PAIN_FUNCTIONAL_ASSESSMENT: FLACC (FACE, LEGS, ACTIVITY, CRY, CONSOLABILITY)
PAIN_FUNCTIONAL_ASSESSMENT: 0-10
PAIN_FUNCTIONAL_ASSESSMENT: NO/DENIES PAIN
PAIN_FUNCTIONAL_ASSESSMENT: 0-10
PAIN_FUNCTIONAL_ASSESSMENT: FLACC (FACE, LEGS, ACTIVITY, CRY, CONSOLABILITY)
PAIN_FUNCTIONAL_ASSESSMENT: WONG-BAKER FACES

## 2024-05-01 ASSESSMENT — PAIN SCALES - WONG BAKER
WONGBAKER_NUMERICALRESPONSE: HURTS WORST
WONGBAKER_NUMERICALRESPONSE: NO HURT

## 2024-05-01 ASSESSMENT — PAIN SCALES - GENERAL
PAINLEVEL_OUTOF10: 5 - MODERATE PAIN
PAINLEVEL_OUTOF10: 9
PAINLEVEL_OUTOF10: 10 - WORST POSSIBLE PAIN
PAINLEVEL_OUTOF10: 0 - NO PAIN
PAINLEVEL_OUTOF10: 3
PAINLEVEL_OUTOF10: 0 - NO PAIN
PAINLEVEL_OUTOF10: 8

## 2024-05-01 ASSESSMENT — PAIN DESCRIPTION - LOCATION
LOCATION: LEG
LOCATION: LEG

## 2024-05-01 ASSESSMENT — PAIN DESCRIPTION - DESCRIPTORS: DESCRIPTORS: ACHING

## 2024-05-01 NOTE — NURSING NOTE
Assumed care of patient.  After helping patient to bedside commode, bed alarm has been reactivated and call light is within reach.  No other needs at this time

## 2024-05-01 NOTE — PROGRESS NOTES
"Daphne Morris is a 68 y.o. female on day 2 of admission presenting with Cellulitis of right lower extremity.    Subjective   Patient seen and examined.  No documented concerns/events overnight from nursing.  Patient does have increased edema.  She will be transported to [facility] today for dialysis catheter placement by IR       Objective     Physical Exam  Constitutional:       General: She is not in acute distress.     Appearance: She is ill-appearing. She is not toxic-appearing.   Cardiovascular:      Rate and Rhythm: Normal rate and regular rhythm.      Pulses: Normal pulses.      Heart sounds: Normal heart sounds.   Pulmonary:      Effort: Pulmonary effort is normal.      Breath sounds: Normal breath sounds.   Abdominal:      General: Bowel sounds are normal.      Palpations: Abdomen is soft.   Musculoskeletal:         General: Normal range of motion.   Skin:     General: Skin is warm and dry. Mulitple scattered ecchymotic areas. Multiple skin tears/scabs  Neurological:      General: No focal deficit present.      Mental Status: She is alert and oriented to person, place, and time.   Psychiatric:         Mood and Affect: Mood normal.         Behavior: Behavior normal.        Last Recorded Vitals  Blood pressure 151/67, pulse 65, temperature 37 °C (98.6 °F), temperature source Oral, resp. rate 20, height 1.676 m (5' 6\"), weight 75.8 kg (167 lb), SpO2 98%.  Intake/Output last 3 Shifts:  I/O last 3 completed shifts:  In: 820 (10.8 mL/kg) [P.O.:720; IV Piggyback:100]  Out: - (0 mL/kg)   Weight: 75.7 kg     Relevant Results  Scheduled medications  amiodarone, 200 mg, oral, Daily with breakfast  aspirin, 81 mg, oral, Daily  atorvastatin, 40 mg, oral, Daily  cephalexin, 500 mg, oral, q8h SUMIT  clopidogrel, 75 mg, oral, Daily  insulin glargine, 14 Units, subcutaneous, Nightly  insulin lispro, 0-5 Units, subcutaneous, TID with meals  levothyroxine, 200 mcg, oral, Daily before breakfast  levothyroxine, 50 mcg, oral, " Daily before breakfast  pantoprazole, 40 mg, oral, BID  venlafaxine XR, 150 mg, oral, Daily      Continuous medications     PRN medications  PRN medications: acetaminophen **OR** acetaminophen **OR** acetaminophen, dextrose, dextrose, glucagon, glucagon     following data reviewed    WBC- 7.8  Hgb-9.8  Hct-33.6  Platelets-258        Na-137  K+-4.7  Cl-102  Bicarb-22  BUN-63  creatinine-3.6                              Assessment/Plan   Principal Problem:    Cellulitis of right lower extremity  Active Problems:    Unspecified open wound, right lower leg, initial encounter    Chronic kidney disease, stage V (Multi)    Open wound of right forearm    Open wound of right upper arm    Open wound of left lower leg    Abrasion of buttock, infected    Acute cystitis with hematuria    Dislodged Dialysis Catheter/ESRD on dialysis  -IR will place cath 5/1  -nephrology following  -will receive dialysis when cath placed     Fall  -fall precautions  -PT, OT     DM II  -monitor blood glucose  -ISS     Plan  Above plan discussed with pt and she is in agreement     CARLENE Asif-CNP

## 2024-05-01 NOTE — CARE PLAN
The patient's goals for the shift include      The clinical goals for the shift include walk in chua      Problem: Safety - Adult  Goal: Free from fall injury  Outcome: Progressing     Problem: Chronic Conditions and Co-morbidities  Goal: Patient's chronic conditions and co-morbidity symptoms are monitored and maintained or improved  Outcome: Progressing     Problem: Skin  Goal: Decreased wound size/increased tissue granulation at next dressing change  Outcome: Progressing  Goal: Participates in plan/prevention/treatment measures  Outcome: Progressing  Goal: Prevent/manage excess moisture  Outcome: Progressing  Goal: Prevent/minimize sheer/friction injuries  Outcome: Progressing     Problem: Fall/Injury  Goal: Not fall by end of shift  Outcome: Progressing  Goal: Verbalize understanding of personal risk factors for fall in the hospital  Outcome: Progressing  Goal: Use assistive devices by end of the shift  Outcome: Progressing

## 2024-05-01 NOTE — CARE PLAN
The patient's goals for the shift include  rest and comfort    The clinical goals for the shift include walk in chua

## 2024-05-01 NOTE — PROGRESS NOTES
.Report from Sending RN:    Report From: Anahi Parker  Recent Surgery of Procedure: Yes  Baseline Level of Consciousness (LOC): A/O x3  Oxygen Use: Yes  Type: 3 liters using nasal cannula   Diabetic: Yes  Last BP Med Given Day of Dialysis: See EMAR  Last Pain Med Given: See EMAR  Lab Tests to be Obtained with Dialysis: No  Blood Transfusion to be Given During Dialysis: No  Available IV Access: Yes  Medications to be Administered During Dialysis: No  Continuous IV Infusion Running: No  Restraints on Currently or in the Last 24 Hours: No  Hand-Off Communication: pt was admitted due to fall, catheter replacement, and missed dialysis treatments  Dialysis Catheter Dressing: Catheter dressing changed by Hien Ornelas (OCDT) prior to start of dialysis  Last Dressing Change: changed when catheter was placed at [facility]

## 2024-05-01 NOTE — PROGRESS NOTES
05/01/24 1636   Discharge Planning   Living Arrangements Alone   Support Systems Family members;Friends/neighbors;Organized support group (Comment)   Assistance Needed Needs assistance   Type of Residence Private residence   Home or Post Acute Services In home services   Type of Home Care Services Safety alert;DME or oxygen;Home PT;Home OT;Home nursing visits  (Has  House Calls)   Patient expects to be discharged to: Home with  Home Health Care (will need an internal order) and has  House calls. [facility] [place]     Patient does have increased edema. She will be transported to [facility] for dialysis catheter placement by IR.   PLAN: Home with  Home Health Care (will need an internal order) and has  House calls. [facility] [place] on Tues-Thurs- Sat

## 2024-05-01 NOTE — PROGRESS NOTES
Therapy Communication Note    Patient Name: Daphne Morris  MRN: [id number]  Today's Date: 5/1/2024    Discipline: Physical Therapy    Missed Visit Reason:      Missed Time: Cancel    Comment At [place] for kitty. Cancel PT follow up today.

## 2024-05-01 NOTE — NURSING NOTE
Pt returned from cath lab small amount of drainage on dialysis catheter dressing ,bruising noted above dressing near her collar bone .Dressing to RLE changed

## 2024-05-01 NOTE — NURSING NOTE
Pt on leave to [facility] cath lab transported by [facility] ambulance

## 2024-05-01 NOTE — PROGRESS NOTES
CONSULT PROGRESS NOTES    SERVICE DATE: 5/1/2024   SERVICE TIME: 11:35 AM    CONSULTING SERVICE: Nephrology    ASSESSMENT AND PLAN   68-year-old woman with end-stage renal disease presented with dialysis catheter malpositioning.  1.  End-stage renal disease        2.  Malpositioned hemodialysis catheter  3.  Fluid overload  4.  Hyperkalemia  5.  Essential hypertension  6.  Anemia of chronic kidney disease     This woman is hospitalized solely to get a new tunneled hemodialysis catheter implantation.  I do not see any evidence of active infection going on.  Her labs are consistent with that of an end-stage renal disease patient.  She is getting some fluid overload in her chest.  Her potassium is improved today.  She is behind in her dialysis, last hemodialysis was 5 days ago.  I have consulted interventional radiology for hemodialysis tunneled catheter implantation.  Hemodialysis after catheter placement, thereafter acceptable for discharge from my perspective.  I will continue to follow her while here.  Plan on dialysis today for 4 hours, thereafter dialysis tomorrow for 3 hours.  Case discussed with Cassidy Boss CNP.    SUBJECTIVE  INTERVAL HPI: She has no new issues.  She has a little bit more swelling today though.  Blood pressure has been stable.  She awaits hemodialysis catheter implantation.  There is no nausea, no vomiting.  She wonders when she can eat.    MEDICATIONS:  amiodarone, 200 mg, oral, Daily with breakfast  aspirin, 81 mg, oral, Daily  atorvastatin, 40 mg, oral, Daily  cephalexin, 500 mg, oral, q8h SUMIT  clopidogrel, 75 mg, oral, Daily  insulin glargine, 14 Units, subcutaneous, Nightly  insulin lispro, 0-5 Units, subcutaneous, TID with meals  levothyroxine, 200 mcg, oral, Daily before breakfast  levothyroxine, 50 mcg, oral, Daily before breakfast  pantoprazole, 40 mg, oral, BID  venlafaxine XR, 150 mg, oral, Daily           PRN medications: acetaminophen **OR** acetaminophen **OR** acetaminophen,  dextrose, dextrose, glucagon, glucagon     OBJECTIVE  PHYSICAL EXAM:   Heart Rate:  [60-66]   Temp:  [36.1 °C (97 °F)-37 °C (98.6 °F)]   Resp:  [14-21]   BP: (148-173)/(66-91)   SpO2:  [91 %-100 %]   Body mass index is 26.95 kg/m².  This is a chronically ill-appearing elderly obese white woman  Pale skin  Hearing intact  Phonation intact  Moist mucosa  Systolic murmur  Lungs are clear to auscultation bilaterally  Abdomen is soft, nondistended, nontender, positive bowel sounds  No Fay catheter in place, no suprapubic tenderness to palpation  Bilateral chronic venous stasis changes, right lower extremity is extensively wrapped, mild peripheral edema noted  No trauma to her right upper chest  Moves 4 limbs spontaneously  No obvious joint deformities  No lymphadenopathy    DATA:   Labs:  Results for orders placed or performed during the hospital encounter of 04/29/24 (from the past 96 hour(s))   Comprehensive metabolic panel   Result Value Ref Range    Glucose 216 (H) 65 - 99 mg/dL    Sodium 136 133 - 145 mmol/L    Potassium 4.9 3.4 - 5.1 mmol/L    Chloride 97 97 - 107 mmol/L    Bicarbonate 26 24 - 31 mmol/L    Urea Nitrogen 53 (H) 8 - 25 mg/dL    Creatinine 3.60 (H) 0.40 - 1.60 mg/dL    eGFR 13 (L) >60 mL/min/1.73m*2    Calcium 8.3 (L) 8.5 - 10.4 mg/dL    Albumin 3.2 (L) 3.5 - 5.0 g/dL    Alkaline Phosphatase 108 35 - 125 U/L    Total Protein 6.8 5.9 - 7.9 g/dL    AST 18 5 - 40 U/L    Bilirubin, Total 0.2 0.1 - 1.2 mg/dL    ALT 14 5 - 40 U/L    Anion Gap 13 <=19 mmol/L   NT Pro-BNP   Result Value Ref Range    PROBNP 29,640 (H) 0 - 353 pg/mL   Blood Culture    Specimen: Peripheral Venipuncture; Blood culture   Result Value Ref Range    Blood Culture No growth at 1 day    Blood Culture    Specimen: Peripheral Venipuncture; Blood culture   Result Value Ref Range    Blood Culture No growth at 1 day    BLOOD GAS LACTIC ACID, VENOUS   Result Value Ref Range    POCT Lactate, Venous 1.6 0.4 - 2.0 mmol/L   CBC and Auto  Differential   Result Value Ref Range    WBC 10.8 4.4 - 11.3 x10*3/uL    nRBC 0.2 (H) 0.0 - 0.0 /100 WBCs    RBC 3.73 (L) 4.00 - 5.20 x10*6/uL    Hemoglobin 10.9 (L) 12.0 - 16.0 g/dL    Hematocrit 37.9 36.0 - 46.0 %     (H) 80 - 100 fL    MCH 29.2 26.0 - 34.0 pg    MCHC 28.8 (L) 32.0 - 36.0 g/dL    RDW 16.5 (H) 11.5 - 14.5 %    Platelets 273 150 - 450 x10*3/uL    Neutrophils % 69.5 40.0 - 80.0 %    Immature Granulocytes %, Automated 0.5 0.0 - 0.9 %    Lymphocytes % 19.8 13.0 - 44.0 %    Monocytes % 9.5 2.0 - 10.0 %    Eosinophils % 0.1 0.0 - 6.0 %    Basophils % 0.6 0.0 - 2.0 %    Neutrophils Absolute 7.53 1.20 - 7.70 x10*3/uL    Immature Granulocytes Absolute, Automated 0.05 0.00 - 0.70 x10*3/uL    Lymphocytes Absolute 2.14 1.20 - 4.80 x10*3/uL    Monocytes Absolute 1.03 (H) 0.10 - 1.00 x10*3/uL    Eosinophils Absolute 0.01 0.00 - 0.70 x10*3/uL    Basophils Absolute 0.07 0.00 - 0.10 x10*3/uL   Urinalysis with Reflex Culture and Microscopic   Result Value Ref Range    Color, Urine Yellow Light-Yellow, Yellow, Dark-Yellow    Appearance, Urine Ex.Turbid (N) Clear    Specific Gravity, Urine 1.023 1.005 - 1.035    pH, Urine 6.0 5.0, 5.5, 6.0, 6.5, 7.0, 7.5, 8.0    Protein, Urine 600 (3+) (A) NEGATIVE, 10 (TRACE), 20 (TRACE) mg/dL    Glucose, Urine 100 (1+) (A) Normal mg/dL    Blood, Urine 0.1 (1+) (A) NEGATIVE    Ketones, Urine NEGATIVE NEGATIVE mg/dL    Bilirubin, Urine NEGATIVE NEGATIVE    Urobilinogen, Urine Normal Normal mg/dL    Nitrite, Urine NEGATIVE NEGATIVE    Leukocyte Esterase, Urine 250 David/µL (A) NEGATIVE   Microscopic Only, Urine   Result Value Ref Range    WBC, Urine >50 (A) 1-5, NONE /HPF    WBC Clumps, Urine FEW Reference range not established. /HPF    RBC, Urine >20 (A) NONE, 1-2, 3-5 /HPF    Squamous Epithelial Cells, Urine 10-25 (FEW) Reference range not established. /HPF    Bacteria, Urine 3+ (A) NONE SEEN /HPF    Budding Yeast, Urine PRESENT (A) NONE /HPF    Mucus, Urine FEW Reference range  not established. /LPF   Urine Culture    Specimen: Clean Catch/Voided; Urine   Result Value Ref Range    Urine Culture No significant growth    Tissue/Wound Culture/Smear    Specimen: Wound/Tissue; Tissue/Biopsy   Result Value Ref Range    Gram Stain No polymorphonuclear leukocytes seen (A)     Gram Stain (4+) Abundant Gram positive cocci (A)    POCT GLUCOSE   Result Value Ref Range    POCT Glucose 169 (H) 74 - 99 mg/dL   POCT GLUCOSE   Result Value Ref Range    POCT Glucose 143 (H) 74 - 99 mg/dL   POCT GLUCOSE   Result Value Ref Range    POCT Glucose 139 (H) 74 - 99 mg/dL   Renal function panel   Result Value Ref Range    Glucose 136 (H) 65 - 99 mg/dL    Sodium 135 133 - 145 mmol/L    Potassium 4.9 3.4 - 5.1 mmol/L    Chloride 102 97 - 107 mmol/L    Bicarbonate 19 (L) 24 - 31 mmol/L    Urea Nitrogen 57 (H) 8 - 25 mg/dL    Creatinine 3.60 (H) 0.40 - 1.60 mg/dL    eGFR 13 (L) >60 mL/min/1.73m*2    Calcium 8.3 (L) 8.5 - 10.4 mg/dL    Phosphorus 7.1 (H) 2.5 - 4.5 mg/dL    Albumin 2.6 (L) 3.5 - 5.0 g/dL    Anion Gap 14 <=19 mmol/L   POCT GLUCOSE   Result Value Ref Range    POCT Glucose 159 (H) 74 - 99 mg/dL   POCT GLUCOSE   Result Value Ref Range    POCT Glucose 187 (H) 74 - 99 mg/dL   POCT GLUCOSE   Result Value Ref Range    POCT Glucose 177 (H) 74 - 99 mg/dL   CBC   Result Value Ref Range    WBC 7.8 4.4 - 11.3 x10*3/uL    nRBC 0.0 0.0 - 0.0 /100 WBCs    RBC 3.40 (L) 4.00 - 5.20 x10*6/uL    Hemoglobin 9.8 (L) 12.0 - 16.0 g/dL    Hematocrit 33.6 (L) 36.0 - 46.0 %    MCV 99 80 - 100 fL    MCH 28.8 26.0 - 34.0 pg    MCHC 29.2 (L) 32.0 - 36.0 g/dL    RDW 15.9 (H) 11.5 - 14.5 %    Platelets 258 150 - 450 x10*3/uL   Renal function panel   Result Value Ref Range    Glucose 124 (H) 65 - 99 mg/dL    Sodium 137 133 - 145 mmol/L    Potassium 4.7 3.4 - 5.1 mmol/L    Chloride 102 97 - 107 mmol/L    Bicarbonate 22 (L) 24 - 31 mmol/L    Urea Nitrogen 63 (H) 8 - 25 mg/dL    Creatinine 3.60 (H) 0.40 - 1.60 mg/dL    eGFR 13 (L) >60  mL/min/1.73m*2    Calcium 8.1 (L) 8.5 - 10.4 mg/dL    Phosphorus 7.5 (H) 2.5 - 4.5 mg/dL    Albumin 2.4 (L) 3.5 - 5.0 g/dL    Anion Gap 13 <=19 mmol/L   POCT GLUCOSE   Result Value Ref Range    POCT Glucose 115 (H) 74 - 99 mg/dL   POCT GLUCOSE   Result Value Ref Range    POCT Glucose 89 74 - 99 mg/dL         SIGNATURE: Joseph Mitchell MD PATIENT NAME: Daphne Morris   DATE: May 1, 2024 MRN: [id number]   TIME: 11:35 AM PAGER: [phone]

## 2024-05-02 ENCOUNTER — PHARMACY VISIT (OUTPATIENT)
Dept: PHARMACY | Facility: CLINIC | Age: 69
End: 2024-05-02
Payer: MEDICARE

## 2024-05-02 ENCOUNTER — APPOINTMENT (OUTPATIENT)
Dept: DIALYSIS | Facility: HOSPITAL | Age: 69
End: 2024-05-02
Payer: MEDICARE

## 2024-05-02 ENCOUNTER — DOCUMENTATION (OUTPATIENT)
Dept: HOME HEALTH SERVICES | Facility: HOME HEALTH | Age: 69
End: 2024-05-02

## 2024-05-02 ENCOUNTER — HOME HEALTH ADMISSION (OUTPATIENT)
Dept: HOME HEALTH SERVICES | Facility: HOME HEALTH | Age: 69
End: 2024-05-02
Payer: MEDICARE

## 2024-05-02 VITALS
HEART RATE: 70 BPM | TEMPERATURE: 98 F | DIASTOLIC BLOOD PRESSURE: 48 MMHG | SYSTOLIC BLOOD PRESSURE: 119 MMHG | HEIGHT: 66 IN | OXYGEN SATURATION: 100 % | BODY MASS INDEX: 26.84 KG/M2 | RESPIRATION RATE: 18 BRPM | WEIGHT: 167 LBS

## 2024-05-02 PROBLEM — T82.9XXA COMPLICATION ASSOCIATED WITH DIALYSIS CATHETER: Status: ACTIVE | Noted: 2024-05-02

## 2024-05-02 LAB
GLUCOSE BLD MANUAL STRIP-MCNC: 107 MG/DL (ref 74–99)
GLUCOSE BLD MANUAL STRIP-MCNC: 50 MG/DL (ref 74–99)
GLUCOSE BLD MANUAL STRIP-MCNC: 61 MG/DL (ref 74–99)
GLUCOSE BLD MANUAL STRIP-MCNC: 87 MG/DL (ref 74–99)
GLUCOSE BLD MANUAL STRIP-MCNC: 95 MG/DL (ref 74–99)
HBV SURFACE AB SER-ACNC: <3.1 MIU/ML
HBV SURFACE AG SERPL QL IA: NONREACTIVE

## 2024-05-02 PROCEDURE — G0378 HOSPITAL OBSERVATION PER HR: HCPCS

## 2024-05-02 PROCEDURE — 8010000001 HC DIALYSIS - HEMODIALYSIS PER DAY

## 2024-05-02 PROCEDURE — 2500000001 HC RX 250 WO HCPCS SELF ADMINISTERED DRUGS (ALT 637 FOR MEDICARE OP): Performed by: NURSE PRACTITIONER

## 2024-05-02 PROCEDURE — 2500000006 HC RX 250 W HCPCS SELF ADMINISTERED DRUGS (ALT 637 FOR ALL PAYERS): Mod: MUE | Performed by: NURSE PRACTITIONER

## 2024-05-02 PROCEDURE — RXMED WILLOW AMBULATORY MEDICATION CHARGE

## 2024-05-02 PROCEDURE — 99231 SBSQ HOSP IP/OBS SF/LOW 25: CPT | Performed by: NURSE PRACTITIONER

## 2024-05-02 PROCEDURE — 2500000005 HC RX 250 GENERAL PHARMACY W/O HCPCS: Performed by: NURSE PRACTITIONER

## 2024-05-02 PROCEDURE — 90937 HEMODIALYSIS REPEATED EVAL: CPT

## 2024-05-02 PROCEDURE — 2500000004 HC RX 250 GENERAL PHARMACY W/ HCPCS (ALT 636 FOR OP/ED): Performed by: INTERNAL MEDICINE

## 2024-05-02 PROCEDURE — 82947 ASSAY GLUCOSE BLOOD QUANT: CPT | Mod: 91

## 2024-05-02 RX ORDER — INSULIN GLARGINE 100 [IU]/ML
10 INJECTION, SOLUTION SUBCUTANEOUS NIGHTLY
Status: DISCONTINUED | OUTPATIENT
Start: 2024-05-02 | End: 2024-05-02 | Stop reason: HOSPADM

## 2024-05-02 RX ORDER — HEPARIN SODIUM 1000 [USP'U]/ML
2000 INJECTION, SOLUTION INTRAVENOUS; SUBCUTANEOUS ONCE
Status: COMPLETED | OUTPATIENT
Start: 2024-05-02 | End: 2024-05-02

## 2024-05-02 RX ORDER — HEPARIN SODIUM 1000 [USP'U]/ML
2000 INJECTION, SOLUTION INTRAVENOUS; SUBCUTANEOUS ONCE
Status: DISCONTINUED | OUTPATIENT
Start: 2024-05-02 | End: 2024-05-02 | Stop reason: HOSPADM

## 2024-05-02 RX ORDER — CEPHALEXIN 500 MG/1
500 CAPSULE ORAL EVERY 8 HOURS SCHEDULED
Qty: 9 CAPSULE | Refills: 0 | Status: SHIPPED | OUTPATIENT
Start: 2024-05-02 | End: 2024-05-10 | Stop reason: SDUPTHER

## 2024-05-02 RX ADMIN — CEPHALEXIN 500 MG: 500 CAPSULE ORAL at 05:24

## 2024-05-02 RX ADMIN — DEXTROSE MONOHYDRATE 12.5 G: 25 INJECTION, SOLUTION INTRAVENOUS at 08:06

## 2024-05-02 RX ADMIN — ATORVASTATIN CALCIUM 40 MG: 40 TABLET, FILM COATED ORAL at 08:06

## 2024-05-02 RX ADMIN — AMIODARONE HYDROCHLORIDE 200 MG: 200 TABLET ORAL at 08:06

## 2024-05-02 RX ADMIN — VENLAFAXINE HYDROCHLORIDE 150 MG: 150 CAPSULE, EXTENDED RELEASE ORAL at 08:06

## 2024-05-02 RX ADMIN — TRAMADOL HYDROCHLORIDE 50 MG: 50 TABLET ORAL at 05:24

## 2024-05-02 RX ADMIN — CLOPIDOGREL BISULFATE 75 MG: 75 TABLET ORAL at 08:06

## 2024-05-02 RX ADMIN — LEVOTHYROXINE SODIUM 50 MCG: 50 TABLET ORAL at 06:15

## 2024-05-02 RX ADMIN — CEPHALEXIN 500 MG: 500 CAPSULE ORAL at 16:43

## 2024-05-02 RX ADMIN — ASPIRIN 81 MG: 81 TABLET, COATED ORAL at 08:06

## 2024-05-02 RX ADMIN — HEPARIN SODIUM 2000 UNITS: 1000 INJECTION INTRAVENOUS; SUBCUTANEOUS at 15:53

## 2024-05-02 RX ADMIN — LEVOTHYROXINE SODIUM 200 MCG: 0.1 TABLET ORAL at 06:16

## 2024-05-02 RX ADMIN — PANTOPRAZOLE SODIUM 40 MG: 40 TABLET, DELAYED RELEASE ORAL at 08:06

## 2024-05-02 RX ADMIN — DEXTROSE MONOHYDRATE 25 G: 25 INJECTION, SOLUTION INTRAVENOUS at 12:08

## 2024-05-02 RX ADMIN — ACETAMINOPHEN 650 MG: 325 TABLET ORAL at 02:33

## 2024-05-02 ASSESSMENT — COGNITIVE AND FUNCTIONAL STATUS - GENERAL
TOILETING: A LOT
STANDING UP FROM CHAIR USING ARMS: A LOT
DAILY ACTIVITIY SCORE: 15
CLIMB 3 TO 5 STEPS WITH RAILING: TOTAL
DRESSING REGULAR UPPER BODY CLOTHING: A LOT
MOVING TO AND FROM BED TO CHAIR: A LOT
MOVING FROM LYING ON BACK TO SITTING ON SIDE OF FLAT BED WITH BEDRAILS: A LOT
PERSONAL GROOMING: A LITTLE
MOBILITY SCORE: 10
TURNING FROM BACK TO SIDE WHILE IN FLAT BAD: A LOT
DRESSING REGULAR LOWER BODY CLOTHING: A LOT
HELP NEEDED FOR BATHING: A LOT
WALKING IN HOSPITAL ROOM: TOTAL

## 2024-05-02 ASSESSMENT — PAIN - FUNCTIONAL ASSESSMENT
PAIN_FUNCTIONAL_ASSESSMENT: FLACC (FACE, LEGS, ACTIVITY, CRY, CONSOLABILITY)
PAIN_FUNCTIONAL_ASSESSMENT: 0-10

## 2024-05-02 ASSESSMENT — PAIN SCALES - GENERAL
PAINLEVEL_OUTOF10: 0 - NO PAIN
PAINLEVEL_OUTOF10: 0 - NO PAIN
PAINLEVEL_OUTOF10: 9

## 2024-05-02 ASSESSMENT — PAIN DESCRIPTION - LOCATION: LOCATION: NECK

## 2024-05-02 ASSESSMENT — PAIN DESCRIPTION - DESCRIPTORS: DESCRIPTORS: ACHING

## 2024-05-02 NOTE — PROGRESS NOTES
05/02/24 1109   Discharge Planning   Living Arrangements Alone   Support Systems Family members;Friends/neighbors;[employer]/alise community   Assistance Needed Needs assistance   Type of Residence Private residence   Number of Stairs to Enter Residence 5   Number of Stairs Within Residence 0   Home or Post Acute Services In home services   Type of Home Care Services Home nursing visits;Home PT;Home OT;Other (Comment);DME or oxygen;Safety alert  ( House Calls)   Patient expects to be discharged to: Home with  Home Care and has  House Calls. Outpatient [facility] in [place]     Per notes: She will be discharged home on Keflex for cellulitis to complete a 5-day course of antibiotics.   -PT, OT who recommended moderate intensity rehab.  Patient is refusing skilled nursing facility.    -She is agreeable to home health care will resume  Home Health Care.  -Will be discharged after receiving dialysis.   -per nephro note:  She is still behind in her dialysis.  Use dialysis today to keep her on schedule.     PLAN: Home with  Home Care and has  House Calls. Outpatient [facility] in [place]  Will send referral to resume to [facility] through [facility]       1458: [facility], updated notes sent through [facility].             Patient signed ORTEGA letter            Patient is for discharge today, had her dialysis. Had a visit from  House Calls and will resume home care

## 2024-05-02 NOTE — HH CARE COORDINATION
Home Care received a Referral for Physical Therapy and Occupational Therapy. We have processed the referral for a Start of Care on 05/03-05/04.     If you have any questions or concerns, please feel free to contact us at [phone]. Follow the prompts, enter your five digit zip code, and you will be directed to your care team on EAST 1.

## 2024-05-02 NOTE — CARE PLAN
The patient's goals for the shift include      The clinical goals for the shift include Dialysis        Problem: Skin  Goal: Decreased wound size/increased tissue granulation at next dressing change  Outcome: Not Progressing  Flowsheets (Taken 5/2/2024 0583)  Decreased wound size/increased tissue granulation at next dressing change: Protective dressings over bony prominences

## 2024-05-02 NOTE — PRE-PROCEDURE NOTE
Report from Sending RN:    Report From: Elizabeth Barkley  Recent Surgery of Procedure: Yes Catheter replaced yesterday  Baseline Level of Consciousness (LOC): Alert and orient x3  Oxygen Use: No  Type: N/A  Diabetic: Yes  Last BP Med Given Day of Dialysis: N/A  Last Pain Med Given: N/A  Lab Tests to be Obtained with Dialysis: No  Blood Transfusion to be Given During Dialysis: No  Available IV Access: Yes  Medications to be Administered During Dialysis: No  Continuous IV Infusion Running: No  Restraints on Currently or in the Last 24 Hours: No  Hand-Off Communication: Verbal  Dialysis Catheter Dressing: Clean dry and intact  Last Dressing Change: 5/1/2024  Patient starting blood pressure 149/86 Pulse 67

## 2024-05-02 NOTE — NURSING NOTE
Assumed care of patient. Patient is awake. Blood sugar was 50 this morning did give dextrose injection and pt ordered breakfast. Call light and possessions within reach. Bed alarm on

## 2024-05-02 NOTE — NURSING NOTE
Pt has a R chest dialysis catheter, drsg dry and intact (dated 5/1) without any redness, swelling or drainage.

## 2024-05-02 NOTE — CARE PLAN
The patient's goals for the shift include discharge    The clinical goals for the shift include Dialysis      Problem: Skin  Goal: Decreased wound size/increased tissue granulation at next dressing change  Outcome: Not Progressing  Flowsheets (Taken 5/2/2024 5155)  Decreased wound size/increased tissue granulation at next dressing change: Protective dressings over bony prominences

## 2024-05-02 NOTE — CARE PLAN
The patient's goals for the shift include      The clinical goals for the shift include ambulate    Over the shift, the patient did not make progress toward the following goals. Barriers to progression include . Recommendations to address these barriers include .

## 2024-05-02 NOTE — PROGRESS NOTES
.Report to Receiving RN:    Report To: Ca Swift  Time Report Given: 3341  Hand-Off Communication: pt tolerated treatment without issues. Pt removed 3.5 liters. Pt post vitals are 116/54 HR (61) post weight 72.2 kg  Complications During Treatment: No  Ultrafiltration Treatment: No  Medications Administered During Dialysis: No  Blood Products Administered During Dialysis: No  Labs Sent During Dialysis: No  Heparin Drip Rate Changes: No  Dialysis Catheter Dressing: Changed prior to start of treatment By Randall Ornelas (OCDT)  Last Dressing Change: island dressing placed during catheter placement at [facility] 5/1/24    Electronic Signatures:  [id number] (Signed Randall Ornelas)       Last Updated: 11:59 PM by RANDALL ORNELAS

## 2024-05-02 NOTE — TELEPHONE ENCOUNTER
ARMIDA Pang - Plan for patient to discharge home today. Per Care Transitions note: She will be discharged home on Keflex for cellulitis to complete a 5-day course of antibiotics. PT/OT recommended moderate intensity rehab, patient is refusing SNF. Will resume [facility].   Autumn Palmer RN visited patient at [facility] and confirmed 5/3/24 House Calls visit with you.

## 2024-05-02 NOTE — NURSING NOTE
Patient is in the bed ordering dinner. Finished dialysis early. Patient is to be discharge is calling her nephew to pick her up. Not in any acute distress. Call light and possessions within reach. Bed alarm on

## 2024-05-02 NOTE — DISCHARGE SUMMARY
"Discharge Diagnosis  Cellulitis of right lower extremity    Issues Requiring Follow-Up      Discharge Meds     Your medication list        START taking these medications        Instructions Last Dose Given Next Dose Due   cephalexin 500 mg capsule  Commonly known as: Keflex      Take 1 capsule (500 mg) by mouth every 8 hours for 3 days.              CONTINUE taking these medications        Instructions Last Dose Given Next Dose Due   acetaminophen 500 mg tablet  Commonly known as: Tylenol           albuterol 90 mcg/actuation inhaler           amiodarone 200 mg tablet  Commonly known as: Pacerone           aspirin 81 mg EC tablet           atorvastatin 40 mg tablet  Commonly known as: Lipitor           BD Calista 2nd Gen Pen Needle 32 gauge x 5/32\" needle  Generic drug: pen needle, diabetic           clopidogrel 75 mg tablet  Commonly known as: Plavix           ferrous gluconate 324 (38 Fe) mg tablet  Commonly known as: Fergon      Take 1 tablet (324 mg) by mouth once daily with breakfast.       FreeStyle Shakir 14 Day Sensor kit  Generic drug: flash glucose sensor kit           FreeStyle Shakir 3 [place] misc  Generic drug: blood-glucose meter,continuous      Use as instructed       FreeStyle Shakir 3 Sensor device  Generic drug: blood-glucose sensor      Change sensor Q 14 days       HumaLOG KwikPen Insulin 100 unit/mL injection  Generic drug: insulin lispro      up to 15 units Subcutaneous three times a day per sliding scaleup to 15 units Subcutaneous three times a day per sliding scale       Lantus Solostar U-100 Insulin 100 unit/mL (3 mL) pen  Generic drug: insulin glargine      Inject 10 Units under the skin once daily at bedtime.       levothyroxine 50 mcg tablet  Commonly known as: Synthroid, Levoxyl           levothyroxine 200 mcg tablet  Commonly known as: Synthroid, Levoxyl      Take 1 tablet (200 mcg) by mouth once daily in the morning. Take before meals.       pantoprazole 40 mg EC tablet  Commonly known as: " ProtoNix      Take 1 tablet (40 mg) by mouth 2 times a day.       venlafaxine XR 75 mg 24 hr capsule  Commonly known as: Effexor-XR                  STOP taking these medications      buPROPion  mg 24 hr tablet  Commonly known as: Wellbutrin XL        ipratropium-albuteroL 0.5-2.5 mg/3 mL nebulizer solution  Commonly known as: Duo-Neb        melatonin 5 mg tablet        metoprolol succinate XL 50 mg 24 hr tablet  Commonly known as: Toprol-XL        oxygen gas therapy  Commonly known as: O2        traZODone 50 mg tablet  Commonly known as: Desyrel        zinc oxide 40 % ointment ointment                  Where to Get Your Medications        These medications were sent to [facility]  [address], [address], [facility] [address]      Hours: 9 AM to 6 PM Mon-Fri, 9 AM to 1 PM Sat Phone: [phone]   cephalexin 500 mg capsule         Test Results Pending At Discharge  Pending Labs       Order Current Status    Extra Urine Gray Tube Collected (04/29/24 1621)    Urinalysis with Reflex Culture and Microscopic In process    Blood Culture Preliminary result    Blood Culture Preliminary result    Tissue/Wound Culture/Smear Preliminary result            Hospital Course  HPI from admission  Daphne Morris is a 68 y.o. female presenting with a fall and the accidental removal of her dialysis catheter     She was discharged from a rehab facility 2 days ago and somehow she tripped and fell.  She has suffered a significant gash in her right shin and somehow her tunneled dialysis catheter that was in her right subclavian has come completely out.  Today is Tuesday.  She has not been dialyzed since last Thursday.  She feels other than the fall and the loss of her dialysis access she is doing fine  During hospital course patient was seen by nephrology.  He went to [facility] on 5/1/2024 to have dialysis catheter placed by IR.  She received dialysis yesterday and will receive dialysis prior to discharge today.   She will resume her outpatient dialysis schedule.  She will follow-up with Dr. Mendez freeman on an outpatient basis.  She will be discharged home on Keflex for cellulitis to complete a 5-day course of antibiotics. she was seen by PT, OT who recommended moderate intensity rehab.  Patient is refusing skilled nursing facility.  She is agreeable to home health care will be arranged by case management.  She will be discharged when arrangements have been made and she receives dialysis    Pertinent Physical Exam At Time of Discharge  Physical Exam  Constitutional:       General: She is not in acute distress.     Appearance: She is ill-appearing. She is not toxic-appearing.   Cardiovascular:      Rate and Rhythm: Normal rate and regular rhythm.      Pulses: Normal pulses.      Heart sounds: Normal heart sounds.   Pulmonary:      Effort: Pulmonary effort is normal.      Breath sounds: Normal breath sounds.   Abdominal:      General: Bowel sounds are normal.      Palpations: Abdomen is soft.   Musculoskeletal:         General: Normal range of motion.   Skin:     General: Skin is warm and dry.      Coloration: Skin is pale.   Neurological:      General: No focal deficit present.      Mental Status: She is alert and oriented to person, place, and time.   Psychiatric:         Mood and Affect: Mood normal.         Behavior: Behavior normal.         Outpatient Follow-Up  Future Appointments   Date Time Provider Department [place]   5/3/2024  1:00 PM Poly Diaz APRN-CNP [id number] [facility]   5/13/2024  3:45 PM Jamee Haddad MD [id number] None         CARLENE Asif-CNP

## 2024-05-03 ENCOUNTER — DOCUMENTATION (OUTPATIENT)
Dept: PRIMARY CARE | Facility: CLINIC | Age: 69
End: 2024-05-03

## 2024-05-03 ENCOUNTER — OFFICE VISIT (OUTPATIENT)
Dept: PRIMARY CARE | Facility: CLINIC | Age: 69
End: 2024-05-03
Payer: MEDICARE

## 2024-05-03 VITALS
OXYGEN SATURATION: 97 % | DIASTOLIC BLOOD PRESSURE: 82 MMHG | TEMPERATURE: 97.3 F | RESPIRATION RATE: 18 BRPM | HEART RATE: 62 BPM | BODY MASS INDEX: 26.97 KG/M2 | WEIGHT: 167 LBS | SYSTOLIC BLOOD PRESSURE: 142 MMHG

## 2024-05-03 DIAGNOSIS — D63.1 ANEMIA DUE TO CHRONIC KIDNEY DISEASE, ON CHRONIC DIALYSIS (MULTI): ICD-10-CM

## 2024-05-03 DIAGNOSIS — F41.8 DEPRESSION WITH ANXIETY: ICD-10-CM

## 2024-05-03 DIAGNOSIS — I48.0 PAROXYSMAL ATRIAL FIBRILLATION (MULTI): ICD-10-CM

## 2024-05-03 DIAGNOSIS — E03.9 HYPOTHYROIDISM, UNSPECIFIED TYPE: ICD-10-CM

## 2024-05-03 DIAGNOSIS — Z79.4 TYPE 2 DIABETES MELLITUS WITH HYPERGLYCEMIA, WITH LONG-TERM CURRENT USE OF INSULIN (MULTI): ICD-10-CM

## 2024-05-03 DIAGNOSIS — I50.33 ACUTE ON CHRONIC DIASTOLIC HEART FAILURE (MULTI): ICD-10-CM

## 2024-05-03 DIAGNOSIS — N18.6 ANEMIA DUE TO CHRONIC KIDNEY DISEASE, ON CHRONIC DIALYSIS (MULTI): ICD-10-CM

## 2024-05-03 DIAGNOSIS — L03.115 CELLULITIS OF RIGHT LOWER EXTREMITY: Primary | ICD-10-CM

## 2024-05-03 DIAGNOSIS — Z99.2 ANEMIA DUE TO CHRONIC KIDNEY DISEASE, ON CHRONIC DIALYSIS (MULTI): ICD-10-CM

## 2024-05-03 DIAGNOSIS — I10 ESSENTIAL HYPERTENSION: ICD-10-CM

## 2024-05-03 DIAGNOSIS — T82.9XXA COMPLICATION ASSOCIATED WITH DIALYSIS CATHETER: ICD-10-CM

## 2024-05-03 DIAGNOSIS — E11.65 TYPE 2 DIABETES MELLITUS WITH HYPERGLYCEMIA, WITH LONG-TERM CURRENT USE OF INSULIN (MULTI): ICD-10-CM

## 2024-05-03 DIAGNOSIS — N18.6 END STAGE RENAL DISEASE (MULTI): ICD-10-CM

## 2024-05-03 LAB — GLUCOSE BLD MANUAL STRIP-MCNC: 93 MG/DL (ref 74–99)

## 2024-05-03 PROCEDURE — 3062F POS MACROALBUMINURIA REV: CPT | Performed by: NURSE PRACTITIONER

## 2024-05-03 PROCEDURE — 1126F AMNT PAIN NOTED NONE PRSNT: CPT | Performed by: NURSE PRACTITIONER

## 2024-05-03 PROCEDURE — 3077F SYST BP >= 140 MM HG: CPT | Performed by: NURSE PRACTITIONER

## 2024-05-03 PROCEDURE — 1157F ADVNC CARE PLAN IN RCRD: CPT | Performed by: NURSE PRACTITIONER

## 2024-05-03 PROCEDURE — 3079F DIAST BP 80-89 MM HG: CPT | Performed by: NURSE PRACTITIONER

## 2024-05-03 PROCEDURE — 1160F RVW MEDS BY RX/DR IN RCRD: CPT | Performed by: NURSE PRACTITIONER

## 2024-05-03 PROCEDURE — 1123F ACP DISCUSS/DSCN MKR DOCD: CPT | Performed by: NURSE PRACTITIONER

## 2024-05-03 PROCEDURE — 1159F MED LIST DOCD IN RCRD: CPT | Performed by: NURSE PRACTITIONER

## 2024-05-03 PROCEDURE — 99350 HOME/RES VST EST HIGH MDM 60: CPT | Performed by: NURSE PRACTITIONER

## 2024-05-03 PROCEDURE — 1111F DSCHRG MED/CURRENT MED MERGE: CPT | Performed by: NURSE PRACTITIONER

## 2024-05-03 RX ORDER — LEVOTHYROXINE SODIUM 50 UG/1
50 TABLET ORAL
Qty: 90 TABLET | Refills: 3 | Status: SHIPPED | OUTPATIENT
Start: 2024-05-03

## 2024-05-03 RX ORDER — VENLAFAXINE HYDROCHLORIDE 75 MG/1
150 CAPSULE, EXTENDED RELEASE ORAL DAILY
Qty: 180 CAPSULE | Refills: 3 | Status: SHIPPED | OUTPATIENT
Start: 2024-05-03

## 2024-05-03 ASSESSMENT — ENCOUNTER SYMPTOMS
TROUBLE SWALLOWING: 0
SHORTNESS OF BREATH: 1
DIZZINESS: 1
CHILLS: 0
ABDOMINAL PAIN: 0
WOUND: 1
PALPITATIONS: 1
DIARRHEA: 0
ARTHRALGIAS: 1
COUGH: 0
LIGHT-HEADEDNESS: 0
ENDOCRINE COMMENTS: POSITIVE FOR DIABETES
VOMITING: 0
CONSTIPATION: 0
APPETITE CHANGE: 0
WEAKNESS: 1
NAUSEA: 0
WHEEZING: 0
DYSURIA: 0
FEVER: 0

## 2024-05-03 ASSESSMENT — PAIN SCALES - GENERAL: PAINLEVEL: 0-NO PAIN

## 2024-05-03 NOTE — PROGRESS NOTES
Subjective   Patient ID: Daphne Morris is a 68 y.o. female who presents for Hospital Follow-up (Cellulitis of right lower extremity ).    Visit for 67 y/o female seen today in private home, accompanied by house calls BRIE Leyva for Hospital follow up. Patient has had multiple hospitalizations this year, multiple evaluations in the ED and has been at [facility] for SNF until 4/25/24.     Per Hospital Course: Daphne Morris is a 68 y.o. female presenting with a fall and the accidental removal of her dialysis catheter. She was discharged from a rehab facility 2 days ago and somehow she tripped and fell. She has suffered a significant gash in her right shin and somehow her tunneled dialysis catheter that was in her right subclavian has come completely out. Today is Tuesday. She has not been dialyzed since last Thursday. She feels other than the fall and the loss of her dialysis access she is doing fine. During hospital course patient was seen by nephrology.  She went to [facility] on 5/1/2024 to have dialysis catheter placed by IR. She received dialysis yesterday and will receive dialysis prior to discharge today. She will resume her outpatient dialysis schedule.  She will follow-up with Dr. Mitchell on an outpatient basis.  She will be discharged home on Keflex for cellulitis to complete a 5-day course of antibiotics. She was seen by PT, OT who recommended moderate intensity rehab.  Patient is refusing skilled nursing facility. She is agreeable to home health care will be arranged by case management.      Patient is sitting at dining room table this afternoon. She does not have pants on. Did not get dressed yet today. She is alert, oriented, able to answer all questions regarding her health. She lives alone in a mobile home that is very unkept. She has difficulty finding anything that she needs. She has a friend Doug that comes to check on her and assists with transportation to dialysis or to medical  appointments. Her nephew Silver will also help with transportation. She has difficulty managing her medications and previously had Autumn from house calls filling her pill box every 2 weeks. She tells us that she took medications from her pill box this morning. These were from about 4 months ago when she was last home before going to [facility]. She did take her Cephalexin today that [facility] gave her prior to discharge. She is able to do her own dressing and toileting but has difficulty showering. She reports that she is taking sponge baths. She is not cooking. She is afraid to stand by the stove as she is worried she will fall. She is microwaving foods from her freezer and has been eating chips during the follow up today. She was previously receiving meals on wheels but does not want to restart services as she will have dialysis T/TH/S and plans to schedule medical appointments on Mondays, Wednesdays and Fridays. History of DM. Does not have freestyle vicente or insulin in the home. Reports that the pharmacy has the vicente and insulin and her friend Doug is going to  the medications. Pt also reports that she received a call from [facility] and will be having a PT eval on Sunday, OT eval on Monday.     Home Visit:          Medically necessary due to: Illness or condition that results in activity lmitation or restriction that impacts the ability to leave home such as:, unsteady gait/poor balance, shortness of breath with exertion         Current Outpatient Medications:     acetaminophen (Tylenol) 500 mg tablet, Take 2 tablets (1,000 mg) by mouth 2 times a day., Disp: , Rfl:     albuterol 90 mcg/actuation inhaler, Inhale 2 puffs every 4 hours if needed., Disp: , Rfl:     amiodarone (Pacerone) 200 mg tablet, Take 1 tablet (200 mg) by mouth once daily with breakfast., Disp: , Rfl:     aspirin 81 mg EC tablet, Take 1 tablet (81 mg) by mouth once daily., Disp: , Rfl:     atorvastatin (Lipitor) 40 mg tablet, Take 1 tablet  "(40 mg) by mouth once daily., Disp: , Rfl:     BD Calista 2nd Gen Pen Needle 32 gauge x 5/32\" needle, USE SUBCUTANEOUSLY AS DIRECTED TWICE DAILY, Disp: , Rfl:     cephalexin (Keflex) 500 mg capsule, Take 1 capsule (500 mg) by mouth every 8 hours for 3 days., Disp: 9 capsule, Rfl: 0    clopidogrel (Plavix) 75 mg tablet, Take 1 tablet (75 mg) by mouth once daily., Disp: , Rfl:     ferrous gluconate 324 (38 Fe) MG tablet, Take 1 tablet (324 mg) by mouth once daily with breakfast., Disp: 30 tablet, Rfl: 3    FreeStyle Shakir 14 Day Sensor kit, USE AS DIRECTED TO CHECK SUGARS 3 TO 4 TIMES DAILY, Disp: , Rfl:     FreeStyle Shakir 3 [place] misc, Use as instructed, Disp: 1 each, Rfl: 0    FreeStyle Shakir 3 Sensor device, Change sensor Q 14 days, Disp: 2 each, Rfl: 11    HumaLOG KwikPen Insulin 100 unit/mL injection, up to 15 units Subcutaneous three times a day per sliding scaleup to 15 units Subcutaneous three times a day per sliding scale, Disp: 5 each, Rfl: 3    Lantus Solostar U-100 Insulin 100 unit/mL (3 mL) pen, Inject 10 Units under the skin once daily at bedtime., Disp: 9 mL, Rfl: 3    levothyroxine (Synthroid, Levoxyl) 200 mcg tablet, Take 1 tablet (200 mcg) by mouth once daily in the morning. Take before meals., Disp: 90 tablet, Rfl: 3    levothyroxine (Synthroid, Levoxyl) 50 mcg tablet, Take 1 tablet (50 mcg) by mouth once daily in the morning. Take before meals., Disp: 90 tablet, Rfl: 3    pantoprazole (ProtoNix) 40 mg EC tablet, Take 1 tablet (40 mg) by mouth 2 times a day., Disp: 60 tablet, Rfl: 6    venlafaxine XR (Effexor-XR) 75 mg 24 hr capsule, Take 2 capsules (150 mg) by mouth once daily., Disp: 180 capsule, Rfl: 3  No current facility-administered medications for this visit.     Review of Systems   Constitutional:  Negative for appetite change, chills and fever.   HENT:  Negative for hearing loss and trouble swallowing.    Respiratory:  Positive for shortness of breath (with exertion). Negative for cough and " wheezing.    Cardiovascular:  Positive for palpitations (occasional). Negative for chest pain and leg swelling.   Gastrointestinal:  Negative for abdominal pain, constipation, diarrhea, nausea and vomiting.   Endocrine:        Positive for diabetes   Genitourinary:  Negative for dysuria.   Musculoskeletal:  Positive for arthralgias and gait problem.   Skin:  Positive for wound.   Neurological:  Positive for dizziness (occasional) and weakness. Negative for light-headedness.   Psychiatric/Behavioral:          Positive for anxiety, depression     Objective   /82 (BP Location: Left arm, Patient Position: Sitting, BP Cuff Size: Adult)   Pulse 62   Temp 36.3 °C (97.3 °F) (Temporal)   Resp 18   Wt 75.8 kg (167 lb)   SpO2 97%   BMI 26.97 kg/m²     Physical Exam  Constitutional:       General: She is not in acute distress.     Appearance: Alert. Sitting on Rolator. She is chronically ill. She is not toxic-appearing.   Cardiovascular:      Rate and Rhythm: Normal rate and regular rhythm.      Pulses: Normal pulses.      Heart sounds: Normal heart sounds.      Tunneled dialysis cath to right chest   Pulmonary:      Effort: Pulmonary effort is normal.      Breath sounds: Normal breath sounds.   Abdominal:      General: Bowel sounds are normal.      Palpations: Abdomen is soft.   Musculoskeletal:         General: Normal range of motion.   Skin:     General: Skin is warm and dry.      Coloration: Skin is pale. Large U shaped skin tear to right lower leg with small amount of drainage, covered with kerlix. Oval shaped skin tear to left leg without drainage. Bilateral legs erythematous but left leg is non infectious appearing. Dried skin tear to right upper arm near elbow.   Neurological:      General: No focal deficit present.      Mental Status: She is alert and oriented to person, place, and time.   Psychiatric:         Mood and Affect: Mood normal.         Behavior: Behavior normal.     Assessment/Plan   Diagnoses  and all orders for this visit:  Cellulitis of right lower extremity  -acute infection of RLE 2/2 mechanical fall resulting in large skin tear of right shin  -continue Cephalexin     Complication associated with dialysis catheter  -patient had new tunneled dialysis cath placed during hospitalization  -follow up with vascular surgeon on 5/13    End stage renal disease (Multi)  -chronic  -continue dialysis at [facility] on T/T/S    Essential hypertension  -chronic, BP slightly elevated on exam  -continue aspirin    Acute on chronic diastolic heart failure (Multi)  -chronic, LE edema has improved since starting dialysis  -no longer on Torsemide    Paroxysmal atrial fibrillation (Multi)  -chronic, HR regular, rate controlled at this time  -continue amiodorone    Type 2 diabetes mellitus with hyperglycemia, with long-term current use of insulin (Multi)  -chronic, managed with lantus, humalog  -encouraged pt to have friend zoya  a new freestyle vicente sensor/insulin at the pharmacy as soon as possible    Anemia due to chronic kidney disease, on chronic dialysis (Multi)  -chronic, managed with ferrous gluconate     Hypothyroidism, unspecified type  -     levothyroxine (Synthroid, Levoxyl) 50 mcg tablet; Take 1 tablet (50 mcg) by mouth once daily in the morning. Take before meals.    Depression with anxiety  -     venlafaxine XR (Effexor-XR) 75 mg 24 hr capsule; Take 2 capsules (150 mg) by mouth once daily.    Patient seen today in private home, in no acute distress. She is chronically ill. Has difficulty with ADLs and medication management. She declined SNF. She is very high risk for rehospitalization. I will see patient in home in 2 weeks for follow up. She should be starting [facility] services this weekend. She needs PT/OT/SN for increased strength and conditioning, management of right leg wound. Total of 65 minutes spent with patient with greater than 50% spent on counseling, reviewing medical records and coordination of  care.       Poly Diaz, APRN-CNP

## 2024-05-03 NOTE — PROGRESS NOTES
House Calls CM in home visit with provider. Was re-hospitalized s/p fall at home after extended hospital and rehab stay. Lives home alone with help from friend and nephew. Stated she has food in refrigerator and freezer. Using rollator. Having difficulty with transfers/ambulation. Received call regarding start of care for HHC/therapy. Right chest dialysis catheter in place. Friend and nephew are transporting to dialysis in [place] Tues/Thurs/Sat. Discussed dietary restrictions with regard to dialysis as well as diabetes. Awaiting friend to  refill sensors for Freestyle Shakir as well as insulin. Sustained wounds to BLE, currently at various stages of healing, to BLE. Reviewed medications with provider per hospital discharge orders. Had all medications available in home. Refills sent by provider as needed. Pill boxes filled for 2 weeks. Aware she is to finish bottle of Keflex sent home from hospital. Will follow up in home in 2 weeks.

## 2024-05-03 NOTE — NURSING NOTE
This nurse reviewed discharge paperwork with patient's nephew, oral antibiotics given to nephew as well as other personal belongings. Patient and nephew confirm that patient has all of her belongings.

## 2024-05-04 LAB
BACTERIA BLD CULT: NORMAL
BACTERIA BLD CULT: NORMAL

## 2024-05-05 ENCOUNTER — HOME CARE VISIT (OUTPATIENT)
Dept: HOME HEALTH SERVICES | Facility: HOME HEALTH | Age: 69
End: 2024-05-05

## 2024-05-06 ENCOUNTER — HOME CARE VISIT (OUTPATIENT)
Dept: HOME HEALTH SERVICES | Facility: HOME HEALTH | Age: 69
End: 2024-05-06
Payer: MEDICARE

## 2024-05-06 LAB
BACTERIA SPEC CULT: ABNORMAL
BACTERIA SPEC CULT: ABNORMAL
GRAM STN SPEC: ABNORMAL
GRAM STN SPEC: ABNORMAL

## 2024-05-06 PROCEDURE — 0023 HH SOC

## 2024-05-06 PROCEDURE — 1090000001 HH PPS REVENUE CREDIT

## 2024-05-06 PROCEDURE — G0151 HHCP-SERV OF PT,EA 15 MIN: HCPCS | Mod: HHH

## 2024-05-06 PROCEDURE — 169592 NO-PAY CLAIM PROCEDURE

## 2024-05-06 PROCEDURE — 1090000002 HH PPS REVENUE DEBIT

## 2024-05-06 ASSESSMENT — ENCOUNTER SYMPTOMS
PAIN LOCATION - PAIN SEVERITY: 4/10
PAIN: 1
PAIN LOCATION: RIGHT LEG
PAIN LOCATION - PAIN SEVERITY: 4/10
PAIN LOCATION - PAIN QUALITY: ACHING
PAIN LOCATION: CHEST
PERSON REPORTING PAIN: PATIENT
PAIN LOCATION - PAIN SEVERITY: 4/10
PAIN LOCATION: LEFT LEG

## 2024-05-06 ASSESSMENT — ACTIVITIES OF DAILY LIVING (ADL): ENTERING_EXITING_HOME: MINIMUM ASSIST

## 2024-05-06 NOTE — HOME HEALTH
Hospitalized 4 29 24 for baceterial lnfection to legs.  Returned home Thursday, May 2nd, 2024, Fell May 3rd, 2024.  Fall tore the port out of the right side of the chest.  Tore my legs and arms, went back to the hospital to get leg wounds dressed but also to get the port fixed as well.  She was in [place] for my legs.  Then [facility] to get port fixed then [facility] for this.   Home since at least Saturday.   I spoke with patient who was at home on Sunday morning, 5 5 24.    Multiple falls this year.  More than 12.  I was in the hospital for a month, then [facility] for 3 months then came home on a Friday, following Thursday I fell and I went back to the hospital.    TUG 74 seconds.

## 2024-05-07 ENCOUNTER — HOME CARE VISIT (OUTPATIENT)
Dept: HOME HEALTH SERVICES | Facility: HOME HEALTH | Age: 69
End: 2024-05-07
Payer: MEDICARE

## 2024-05-07 VITALS
TEMPERATURE: 97.4 F | DIASTOLIC BLOOD PRESSURE: 82 MMHG | SYSTOLIC BLOOD PRESSURE: 142 MMHG | RESPIRATION RATE: 18 BRPM | HEIGHT: 66 IN | BODY MASS INDEX: 26.84 KG/M2 | HEART RATE: 62 BPM | WEIGHT: 167 LBS

## 2024-05-07 PROCEDURE — 1090000002 HH PPS REVENUE DEBIT

## 2024-05-07 PROCEDURE — 1090000001 HH PPS REVENUE CREDIT

## 2024-05-07 ASSESSMENT — ENCOUNTER SYMPTOMS
PAIN SEVERITY GOAL: 0/10
LOWEST PAIN SEVERITY IN PAST 24 HOURS: 4/10
SUBJECTIVE PAIN PROGRESSION: GRADUALLY IMPROVING
HIGHEST PAIN SEVERITY IN PAST 24 HOURS: 4/10

## 2024-05-08 ENCOUNTER — HOME CARE VISIT (OUTPATIENT)
Dept: HOME HEALTH SERVICES | Facility: HOME HEALTH | Age: 69
End: 2024-05-08
Payer: MEDICARE

## 2024-05-08 VITALS
DIASTOLIC BLOOD PRESSURE: 87 MMHG | SYSTOLIC BLOOD PRESSURE: 153 MMHG | HEART RATE: 95 BPM | TEMPERATURE: 98.2 F | RESPIRATION RATE: 20 BRPM | OXYGEN SATURATION: 95 %

## 2024-05-08 PROCEDURE — 1090000002 HH PPS REVENUE DEBIT

## 2024-05-08 PROCEDURE — 1090000001 HH PPS REVENUE CREDIT

## 2024-05-08 PROCEDURE — G0299 HHS/HOSPICE OF RN EA 15 MIN: HCPCS | Mod: HHH

## 2024-05-08 ASSESSMENT — ENCOUNTER SYMPTOMS
DYSPNEA ACTIVITY LEVEL: AFTER AMBULATING 10 - 20 FT
CHANGE IN APPETITE: UNCHANGED
MUSCLE WEAKNESS: 1
SHORTNESS OF BREATH: 1
LOWER EXTREMITY EDEMA: 1
CONTUSION: 1
LAST BOWEL MOVEMENT: 66967
APPETITE LEVEL: FAIR
DIZZINESS: 1

## 2024-05-08 ASSESSMENT — ACTIVITIES OF DAILY LIVING (ADL)
EFFECT OF PAIN ON DAILY ACTIVITIES: PROLONGED STANDING
AMBULATION ASSISTANCE: STAND BY ASSIST
CURRENT_FUNCTION: STAND BY ASSIST

## 2024-05-08 ASSESSMENT — PAIN DESCRIPTION - PAIN TYPE: TYPE: ACUTE PAIN

## 2024-05-09 PROCEDURE — 1090000001 HH PPS REVENUE CREDIT

## 2024-05-09 PROCEDURE — 1090000002 HH PPS REVENUE DEBIT

## 2024-05-10 ENCOUNTER — HOME CARE VISIT (OUTPATIENT)
Dept: HOME HEALTH SERVICES | Facility: HOME HEALTH | Age: 69
End: 2024-05-10
Payer: MEDICARE

## 2024-05-10 VITALS
TEMPERATURE: 97.7 F | RESPIRATION RATE: 16 BRPM | DIASTOLIC BLOOD PRESSURE: 74 MMHG | HEART RATE: 85 BPM | SYSTOLIC BLOOD PRESSURE: 148 MMHG

## 2024-05-10 DIAGNOSIS — S81.801D OPEN WOUND OF RIGHT LOWER LEG, SUBSEQUENT ENCOUNTER: Primary | ICD-10-CM

## 2024-05-10 DIAGNOSIS — L08.9: ICD-10-CM

## 2024-05-10 DIAGNOSIS — S30.810A: ICD-10-CM

## 2024-05-10 PROCEDURE — G0299 HHS/HOSPICE OF RN EA 15 MIN: HCPCS | Mod: HHH

## 2024-05-10 PROCEDURE — G0152 HHCP-SERV OF OT,EA 15 MIN: HCPCS | Mod: HHH

## 2024-05-10 PROCEDURE — 1090000001 HH PPS REVENUE CREDIT

## 2024-05-10 PROCEDURE — 1090000002 HH PPS REVENUE DEBIT

## 2024-05-10 RX ORDER — CEPHALEXIN 500 MG/1
500 CAPSULE ORAL EVERY 8 HOURS SCHEDULED
Qty: 21 CAPSULE | Refills: 0 | Status: SHIPPED | OUTPATIENT
Start: 2024-05-10 | End: 2024-05-20 | Stop reason: HOSPADM

## 2024-05-10 ASSESSMENT — ENCOUNTER SYMPTOMS
DYSPNEA ACTIVITY LEVEL: AFTER AMBULATING 10 - 20 FT
SHORTNESS OF BREATH: 1
CONTUSION: 1
PAIN LOCATION - PAIN SEVERITY: 5/10
PAIN LOCATION: LEFT LEG
PAIN LOCATION - EXACERBATING FACTORS: PRESSURE
PAIN LOCATION - PAIN QUALITY: SHOOTING
PAIN LOCATION - EXACERBATING FACTORS: PRESSURE
PAIN LOCATION - PAIN DURATION: ONGOING
LOWER EXTREMITY EDEMA: 1
PAIN LOCATION - PAIN FREQUENCY: INTERMITTENT
PAIN: 1
PAIN LOCATION - PAIN QUALITY: SHOOTING
APPETITE LEVEL: GOOD
PAIN LOCATION - PAIN DURATION: ONGOING
PERSON REPORTING PAIN: PATIENT
CHANGE IN APPETITE: UNCHANGED
DENIES PAIN: 1
LAST BOWEL MOVEMENT: 66970
PAIN LOCATION: RIGHT LEG
PAIN LOCATION - PAIN FREQUENCY: INTERMITTENT
PERSON REPORTING PAIN: PATIENT
PAIN LOCATION - PAIN SEVERITY: 5/10

## 2024-05-10 ASSESSMENT — PAIN SCALES - PAIN ASSESSMENT IN ADVANCED DEMENTIA (PAINAD)
FACIALEXPRESSION: 0 - SMILING OR INEXPRESSIVE.
TOTALSCORE: 0
NEGVOCALIZATION: 0 - NONE.
FACIALEXPRESSION: 0
CONSOLABILITY: 0 - NO NEED TO CONSOLE.
BODYLANGUAGE: 0 - RELAXED.
CONSOLABILITY: 0
NEGVOCALIZATION: 0
BODYLANGUAGE: 0
BREATHING: 0

## 2024-05-10 ASSESSMENT — ACTIVITIES OF DAILY LIVING (ADL)
DRESSING_LB_CURRENT_FUNCTION: INDEPENDENT
TOILETING: 1
BATHING ASSESSED: 1
TOILETING: INDEPENDENT
CURRENT_FUNCTION: STAND BY ASSIST
BATHING_CURRENT_FUNCTION: INDEPENDENT
AMBULATION ASSISTANCE: STAND BY ASSIST

## 2024-05-10 NOTE — PROGRESS NOTES
Received message from Rose Guillaume [place] nurse reporting that patients wound looks red and there is still concerns of infection. I will send for another round of Keflex for her. Im also going to refer patient to Deanna Villa as [place] nurse feels the wound needs debrided. She is switching to [employer] today.

## 2024-05-11 PROCEDURE — 1090000001 HH PPS REVENUE CREDIT

## 2024-05-11 PROCEDURE — 1090000002 HH PPS REVENUE DEBIT

## 2024-05-12 PROCEDURE — 1090000002 HH PPS REVENUE DEBIT

## 2024-05-12 PROCEDURE — 1090000001 HH PPS REVENUE CREDIT

## 2024-05-13 ENCOUNTER — HOME CARE VISIT (OUTPATIENT)
Dept: HOME HEALTH SERVICES | Facility: HOME HEALTH | Age: 69
End: 2024-05-13
Payer: MEDICARE

## 2024-05-13 ENCOUNTER — APPOINTMENT (OUTPATIENT)
Dept: VASCULAR SURGERY | Facility: CLINIC | Age: 69
End: 2024-05-13
Payer: MEDICARE

## 2024-05-13 VITALS
DIASTOLIC BLOOD PRESSURE: 69 MMHG | SYSTOLIC BLOOD PRESSURE: 147 MMHG | HEART RATE: 76 BPM | RESPIRATION RATE: 18 BRPM | TEMPERATURE: 97.6 F

## 2024-05-13 PROCEDURE — 1090000002 HH PPS REVENUE DEBIT

## 2024-05-13 PROCEDURE — G0299 HHS/HOSPICE OF RN EA 15 MIN: HCPCS | Mod: HHH

## 2024-05-13 PROCEDURE — 1090000001 HH PPS REVENUE CREDIT

## 2024-05-13 SDOH — ECONOMIC STABILITY: HOUSING INSECURITY: EVIDENCE OF SMOKING MATERIAL: 1

## 2024-05-13 SDOH — HEALTH STABILITY: MENTAL HEALTH: SMOKING IN HOME: 1

## 2024-05-13 ASSESSMENT — ACTIVITIES OF DAILY LIVING (ADL)
AMBULATION ASSISTANCE: STAND BY ASSIST
CURRENT_FUNCTION: STAND BY ASSIST

## 2024-05-13 ASSESSMENT — ENCOUNTER SYMPTOMS
DYSPNEA ACTIVITY LEVEL: AFTER AMBULATING 10 - 20 FT
APPETITE LEVEL: GOOD
MUSCLE WEAKNESS: 1
LOWER EXTREMITY EDEMA: 1
DENIES PAIN: 1
PERSON REPORTING PAIN: PATIENT
CHANGE IN APPETITE: UNCHANGED
SKIN LESIONS: 1
LAST BOWEL MOVEMENT: 66971
SHORTNESS OF BREATH: 1

## 2024-05-14 ENCOUNTER — TELEPHONE (OUTPATIENT)
Dept: PRIMARY CARE | Facility: CLINIC | Age: 69
End: 2024-05-14
Payer: MEDICARE

## 2024-05-14 ENCOUNTER — HOSPITAL ENCOUNTER (EMERGENCY)
Facility: HOSPITAL | Age: 69
Discharge: OTHER INSTITUTION | End: 2024-05-15
Attending: EMERGENCY MEDICINE
Payer: MEDICARE

## 2024-05-14 ENCOUNTER — APPOINTMENT (OUTPATIENT)
Dept: RADIOLOGY | Facility: HOSPITAL | Age: 69
End: 2024-05-14
Payer: MEDICARE

## 2024-05-14 ENCOUNTER — HOME CARE VISIT (OUTPATIENT)
Dept: HOME HEALTH SERVICES | Facility: HOME HEALTH | Age: 69
End: 2024-05-14
Payer: MEDICARE

## 2024-05-14 DIAGNOSIS — L97.912: ICD-10-CM

## 2024-05-14 DIAGNOSIS — L03.115 CELLULITIS OF RIGHT LOWER EXTREMITY: Primary | ICD-10-CM

## 2024-05-14 LAB
ALBUMIN SERPL BCP-MCNC: 2.7 G/DL (ref 3.4–5)
ALP SERPL-CCNC: 93 U/L (ref 33–136)
ALT SERPL W P-5'-P-CCNC: 7 U/L (ref 7–45)
ANION GAP SERPL CALC-SCNC: 10 MMOL/L (ref 10–20)
AST SERPL W P-5'-P-CCNC: 20 U/L (ref 9–39)
BASOPHILS # BLD AUTO: 0.08 X10*3/UL (ref 0–0.1)
BASOPHILS NFR BLD AUTO: 0.8 %
BILIRUB SERPL-MCNC: 0.4 MG/DL (ref 0–1.2)
BUN SERPL-MCNC: 15 MG/DL (ref 6–23)
CALCIUM SERPL-MCNC: 7.8 MG/DL (ref 8.6–10.3)
CHLORIDE SERPL-SCNC: 101 MMOL/L (ref 98–107)
CO2 SERPL-SCNC: 33 MMOL/L (ref 21–32)
CREAT SERPL-MCNC: 1.51 MG/DL (ref 0.5–1.05)
EGFRCR SERPLBLD CKD-EPI 2021: 37 ML/MIN/1.73M*2
EOSINOPHIL # BLD AUTO: 0.85 X10*3/UL (ref 0–0.7)
EOSINOPHIL NFR BLD AUTO: 8.9 %
ERYTHROCYTE [DISTWIDTH] IN BLOOD BY AUTOMATED COUNT: 16 % (ref 11.5–14.5)
GLUCOSE SERPL-MCNC: 166 MG/DL (ref 74–99)
HCT VFR BLD AUTO: 33.9 % (ref 36–46)
HGB BLD-MCNC: 9.8 G/DL (ref 12–16)
IMM GRANULOCYTES # BLD AUTO: 0.04 X10*3/UL (ref 0–0.7)
IMM GRANULOCYTES NFR BLD AUTO: 0.4 % (ref 0–0.9)
LACTATE SERPL-SCNC: 0.8 MMOL/L (ref 0.4–2)
LYMPHOCYTES # BLD AUTO: 1.17 X10*3/UL (ref 1.2–4.8)
LYMPHOCYTES NFR BLD AUTO: 12.3 %
MCH RBC QN AUTO: 28.3 PG (ref 26–34)
MCHC RBC AUTO-ENTMCNC: 28.9 G/DL (ref 32–36)
MCV RBC AUTO: 98 FL (ref 80–100)
MONOCYTES # BLD AUTO: 0.97 X10*3/UL (ref 0.1–1)
MONOCYTES NFR BLD AUTO: 10.2 %
NEUTROPHILS # BLD AUTO: 6.43 X10*3/UL (ref 1.2–7.7)
NEUTROPHILS NFR BLD AUTO: 67.4 %
NRBC BLD-RTO: 0 /100 WBCS (ref 0–0)
PLATELET # BLD AUTO: 297 X10*3/UL (ref 150–450)
POTASSIUM SERPL-SCNC: 4.8 MMOL/L (ref 3.5–5.3)
PROT SERPL-MCNC: 6.1 G/DL (ref 6.4–8.2)
RBC # BLD AUTO: 3.46 X10*6/UL (ref 4–5.2)
SODIUM SERPL-SCNC: 139 MMOL/L (ref 136–145)
WBC # BLD AUTO: 9.5 X10*3/UL (ref 4.4–11.3)

## 2024-05-14 PROCEDURE — 73590 X-RAY EXAM OF LOWER LEG: CPT | Mod: RT

## 2024-05-14 PROCEDURE — 84075 ASSAY ALKALINE PHOSPHATASE: CPT | Performed by: PHYSICIAN ASSISTANT

## 2024-05-14 PROCEDURE — 96365 THER/PROPH/DIAG IV INF INIT: CPT

## 2024-05-14 PROCEDURE — 2500000005 HC RX 250 GENERAL PHARMACY W/O HCPCS: Performed by: PHYSICIAN ASSISTANT

## 2024-05-14 PROCEDURE — 96367 TX/PROPH/DG ADDL SEQ IV INF: CPT

## 2024-05-14 PROCEDURE — 2500000004 HC RX 250 GENERAL PHARMACY W/ HCPCS (ALT 636 FOR OP/ED): Performed by: PHYSICIAN ASSISTANT

## 2024-05-14 PROCEDURE — 73590 X-RAY EXAM OF LOWER LEG: CPT | Mod: RIGHT SIDE | Performed by: RADIOLOGY

## 2024-05-14 PROCEDURE — 72125 CT NECK SPINE W/O DYE: CPT

## 2024-05-14 PROCEDURE — 70450 CT HEAD/BRAIN W/O DYE: CPT | Performed by: RADIOLOGY

## 2024-05-14 PROCEDURE — 1090000002 HH PPS REVENUE DEBIT

## 2024-05-14 PROCEDURE — 83605 ASSAY OF LACTIC ACID: CPT | Performed by: PHYSICIAN ASSISTANT

## 2024-05-14 PROCEDURE — 70450 CT HEAD/BRAIN W/O DYE: CPT

## 2024-05-14 PROCEDURE — 36415 COLL VENOUS BLD VENIPUNCTURE: CPT | Performed by: PHYSICIAN ASSISTANT

## 2024-05-14 PROCEDURE — 87040 BLOOD CULTURE FOR BACTERIA: CPT | Mod: GENLAB | Performed by: PHYSICIAN ASSISTANT

## 2024-05-14 PROCEDURE — 99285 EMERGENCY DEPT VISIT HI MDM: CPT | Mod: 25

## 2024-05-14 PROCEDURE — 85025 COMPLETE CBC W/AUTO DIFF WBC: CPT | Performed by: PHYSICIAN ASSISTANT

## 2024-05-14 PROCEDURE — 1090000001 HH PPS REVENUE CREDIT

## 2024-05-14 PROCEDURE — 72125 CT NECK SPINE W/O DYE: CPT | Performed by: RADIOLOGY

## 2024-05-14 RX ORDER — VANCOMYCIN HYDROCHLORIDE 1 G/200ML
1000 INJECTION, SOLUTION INTRAVENOUS EVERY 24 HOURS
Status: DISCONTINUED | OUTPATIENT
Start: 2024-05-14 | End: 2024-05-15 | Stop reason: HOSPADM

## 2024-05-14 RX ORDER — CEFTRIAXONE 2 G/50ML
2 INJECTION, SOLUTION INTRAVENOUS ONCE
Status: COMPLETED | OUTPATIENT
Start: 2024-05-14 | End: 2024-05-14

## 2024-05-14 RX ORDER — VANCOMYCIN HYDROCHLORIDE 1 G/20ML
INJECTION, POWDER, LYOPHILIZED, FOR SOLUTION INTRAVENOUS DAILY PRN
Status: DISCONTINUED | OUTPATIENT
Start: 2024-05-14 | End: 2024-05-15 | Stop reason: HOSPADM

## 2024-05-14 RX ORDER — VANCOMYCIN HYDROCHLORIDE 1 G/200ML
1000 INJECTION, SOLUTION INTRAVENOUS ONCE
Status: DISCONTINUED | OUTPATIENT
Start: 2024-05-14 | End: 2024-05-14

## 2024-05-14 RX ADMIN — CEFTRIAXONE 2 G: 2 INJECTION, SOLUTION INTRAVENOUS at 17:39

## 2024-05-14 RX ADMIN — Medication 2 L/MIN: at 20:00

## 2024-05-14 RX ADMIN — VANCOMYCIN HYDROCHLORIDE 1000 MG: 1 INJECTION, SOLUTION INTRAVENOUS at 16:08

## 2024-05-14 RX ADMIN — Medication 2 L/MIN: at 17:35

## 2024-05-14 ASSESSMENT — PAIN SCALES - GENERAL: PAINLEVEL_OUTOF10: 0 - NO PAIN

## 2024-05-14 ASSESSMENT — PAIN - FUNCTIONAL ASSESSMENT: PAIN_FUNCTIONAL_ASSESSMENT: 0-10

## 2024-05-14 NOTE — TELEPHONE ENCOUNTER
Deanna Villa states she received your referral, will see patient next Wednesday as she, herself has not been feeling well.

## 2024-05-14 NOTE — CONSULTS
"Vancomycin Dosing by Pharmacy- INITIAL    Daphne Morris is a 69 y.o. year old female who Pharmacy has been consulted for vancomycin dosing for cellulitis, skin and soft tissue. Based on the patient's indication and renal status this patient will be dosed based on a goal AUC of 400-600.     Renal function is currently improving.    Visit Vitals  BP (!) 157/100 (BP Location: Left arm, Patient Position: Lying)   Pulse 84   Temp 36.7 °C (98 °F)   Resp 18        Lab Results   Component Value Date    CREATININE 1.51 (H) 05/14/2024    CREATININE 3.60 (H) 05/01/2024    CREATININE 3.60 (H) 04/30/2024    CREATININE 3.60 (H) 04/29/2024        Patient weight is No results found for: \"PTWEIGHT\"    No results found for: \"CULTURE\"     No intake/output data recorded.  [unfilled]    Lab Results   Component Value Date    PATIENTTEMP  02/24/2024      Comment:      NOTE: Patient Results are Not Corrected for Temperature    PATIENTTEMP  02/22/2024      Comment:      NOTE: Patient Results are Not Corrected for Temperature    PATIENTTEMP  02/21/2024      Comment:      NOTE: Patient Results are Not Corrected for Temperature          Assessment/Plan     Patient will not be given a loading dose.  Will initiate vancomycin maintenance,  1000 mg every 24 hours.    This dosing regimen is predicted by [employer] to result in the following pharmacokinetic parameters:    Regimen: 1000 mg IV every 24 hours.  Start time: 15:51 on 05/14/2024  Exposure target: AUC24 (range)400-600 mg/L.hr   AUC24,ss: 479 mg/L.hr  Probability of AUC24 > 400: 71 %  Ctrough,ss: 15.3 mg/L  Probability of Ctrough,ss > 20: 23 %  Probability of nephrotoxicity (Lodise MILLER 2009): 11 %    Follow-up level will be ordered on 5/15 at 0500 unless clinically indicated sooner.  Will continue to monitor renal function daily while on vancomycin and order serum creatinine at least every 48 hours if not already ordered.  Follow for continued vancomycin needs, clinical response, and " signs/symptoms of toxicity.       Jigna Cortes, PharmD

## 2024-05-15 ENCOUNTER — HOSPITAL ENCOUNTER (INPATIENT)
Facility: HOSPITAL | Age: 69
LOS: 5 days | Discharge: HOME | DRG: 602 | End: 2024-05-20
Attending: INTERNAL MEDICINE | Admitting: INTERNAL MEDICINE
Payer: MEDICARE

## 2024-05-15 ENCOUNTER — TELEPHONE (OUTPATIENT)
Dept: PRIMARY CARE | Facility: CLINIC | Age: 69
End: 2024-05-15

## 2024-05-15 ENCOUNTER — HOME CARE VISIT (OUTPATIENT)
Dept: HOME HEALTH SERVICES | Facility: HOME HEALTH | Age: 69
End: 2024-05-15
Payer: MEDICARE

## 2024-05-15 VITALS
DIASTOLIC BLOOD PRESSURE: 93 MMHG | TEMPERATURE: 98 F | HEART RATE: 80 BPM | WEIGHT: 169 LBS | RESPIRATION RATE: 18 BRPM | SYSTOLIC BLOOD PRESSURE: 173 MMHG | OXYGEN SATURATION: 100 % | HEIGHT: 66 IN | BODY MASS INDEX: 27.16 KG/M2

## 2024-05-15 DIAGNOSIS — E04.1 THYROID NODULE: ICD-10-CM

## 2024-05-15 DIAGNOSIS — L03.90 CELLULITIS: Primary | ICD-10-CM

## 2024-05-15 DIAGNOSIS — J81.0 ACUTE PULMONARY EDEMA (MULTI): ICD-10-CM
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
## 2024-05-15 DIAGNOSIS — R06.02 SHORTNESS OF BREATH: ICD-10-CM

## 2024-05-15 DIAGNOSIS — S81.802A OPEN WOUND OF LEFT LOWER LEG, INITIAL ENCOUNTER: ICD-10-CM

## 2024-05-15 LAB
ALBUMIN SERPL BCP-MCNC: 2.5 G/DL (ref 3.4–5)
ANION GAP SERPL CALC-SCNC: 8 MMOL/L (ref 10–20)
BUN SERPL-MCNC: 17 MG/DL (ref 6–23)
CALCIUM SERPL-MCNC: 7.7 MG/DL (ref 8.6–10.3)
CHLORIDE SERPL-SCNC: 102 MMOL/L (ref 98–107)
CO2 SERPL-SCNC: 33 MMOL/L (ref 21–32)
CREAT SERPL-MCNC: 1.83 MG/DL (ref 0.5–1.05)
CRP SERPL-MCNC: 5.41 MG/DL
EGFRCR SERPLBLD CKD-EPI 2021: 29 ML/MIN/1.73M*2
ERYTHROCYTE [DISTWIDTH] IN BLOOD BY AUTOMATED COUNT: 15.9 % (ref 11.5–14.5)
ERYTHROCYTE [SEDIMENTATION RATE] IN BLOOD BY WESTERGREN METHOD: 50 MM/H (ref 0–30)
GLUCOSE BLD MANUAL STRIP-MCNC: 188 MG/DL (ref 74–99)
GLUCOSE BLD MANUAL STRIP-MCNC: 229 MG/DL (ref 74–99)
GLUCOSE BLD MANUAL STRIP-MCNC: 94 MG/DL (ref 74–99)
GLUCOSE BLD MANUAL STRIP-MCNC: 98 MG/DL (ref 74–99)
GLUCOSE SERPL-MCNC: 101 MG/DL (ref 74–99)
HCT VFR BLD AUTO: 35.6 % (ref 36–46)
HGB BLD-MCNC: 9.9 G/DL (ref 12–16)
MAGNESIUM SERPL-MCNC: 1.84 MG/DL (ref 1.6–2.4)
MCH RBC QN AUTO: 27.8 PG (ref 26–34)
MCHC RBC AUTO-ENTMCNC: 27.8 G/DL (ref 32–36)
MCV RBC AUTO: 100 FL (ref 80–100)
NRBC BLD-RTO: 0 /100 WBCS (ref 0–0)
PHOSPHATE SERPL-MCNC: 3.1 MG/DL (ref 2.5–4.9)
PLATELET # BLD AUTO: 294 X10*3/UL (ref 150–450)
POTASSIUM SERPL-SCNC: 3.8 MMOL/L (ref 3.5–5.3)
RBC # BLD AUTO: 3.56 X10*6/UL (ref 4–5.2)
SODIUM SERPL-SCNC: 139 MMOL/L (ref 136–145)
WBC # BLD AUTO: 9 X10*3/UL (ref 4.4–11.3)

## 2024-05-15 PROCEDURE — 86140 C-REACTIVE PROTEIN: CPT

## 2024-05-15 PROCEDURE — 99223 1ST HOSP IP/OBS HIGH 75: CPT

## 2024-05-15 PROCEDURE — 2500000006 HC RX 250 W HCPCS SELF ADMINISTERED DRUGS (ALT 637 FOR ALL PAYERS)

## 2024-05-15 PROCEDURE — 87081 CULTURE SCREEN ONLY: CPT | Mod: GEALAB

## 2024-05-15 PROCEDURE — 1100000001 HC PRIVATE ROOM DAILY

## 2024-05-15 PROCEDURE — 2500000004 HC RX 250 GENERAL PHARMACY W/ HCPCS (ALT 636 FOR OP/ED)

## 2024-05-15 PROCEDURE — 80069 RENAL FUNCTION PANEL: CPT

## 2024-05-15 PROCEDURE — 2500000002 HC RX 250 W HCPCS SELF ADMINISTERED DRUGS (ALT 637 FOR MEDICARE OP, ALT 636 FOR OP/ED)

## 2024-05-15 PROCEDURE — 1090000001 HH PPS REVENUE CREDIT

## 2024-05-15 PROCEDURE — 82947 ASSAY GLUCOSE BLOOD QUANT: CPT

## 2024-05-15 PROCEDURE — 1090000002 HH PPS REVENUE DEBIT

## 2024-05-15 PROCEDURE — 2500000001 HC RX 250 WO HCPCS SELF ADMINISTERED DRUGS (ALT 637 FOR MEDICARE OP)

## 2024-05-15 PROCEDURE — 85027 COMPLETE CBC AUTOMATED: CPT

## 2024-05-15 PROCEDURE — 85652 RBC SED RATE AUTOMATED: CPT

## 2024-05-15 PROCEDURE — 83735 ASSAY OF MAGNESIUM: CPT

## 2024-05-15 RX ORDER — FERROUS GLUCONATE 325 MG
324 TABLET ORAL
Status: DISCONTINUED | OUTPATIENT
Start: 2024-05-15 | End: 2024-05-20 | Stop reason: HOSPADM

## 2024-05-15 RX ORDER — METOPROLOL SUCCINATE 50 MG/1
50 TABLET, EXTENDED RELEASE ORAL DAILY
Status: DISCONTINUED | OUTPATIENT
Start: 2024-05-15 | End: 2024-05-20 | Stop reason: HOSPADM

## 2024-05-15 RX ORDER — INSULIN LISPRO 100 [IU]/ML
0-10 INJECTION, SOLUTION INTRAVENOUS; SUBCUTANEOUS
Status: DISCONTINUED | OUTPATIENT
Start: 2024-05-15 | End: 2024-05-20 | Stop reason: HOSPADM

## 2024-05-15 RX ORDER — DEXTROSE 50 % IN WATER (D50W) INTRAVENOUS SYRINGE
12.5
Status: DISCONTINUED | OUTPATIENT
Start: 2024-05-15 | End: 2024-05-20 | Stop reason: HOSPADM

## 2024-05-15 RX ORDER — PANTOPRAZOLE SODIUM 40 MG/1
40 TABLET, DELAYED RELEASE ORAL 2 TIMES DAILY
Status: DISCONTINUED | OUTPATIENT
Start: 2024-05-15 | End: 2024-05-20 | Stop reason: HOSPADM

## 2024-05-15 RX ORDER — VANCOMYCIN HYDROCHLORIDE 1 G/20ML
INJECTION, POWDER, LYOPHILIZED, FOR SOLUTION INTRAVENOUS DAILY PRN
Status: DISCONTINUED | OUTPATIENT
Start: 2024-05-15 | End: 2024-05-20 | Stop reason: HOSPADM

## 2024-05-15 RX ORDER — METOPROLOL SUCCINATE 50 MG/1
50 TABLET, EXTENDED RELEASE ORAL DAILY
COMMUNITY

## 2024-05-15 RX ORDER — CLOPIDOGREL BISULFATE 75 MG/1
75 TABLET ORAL DAILY
Status: DISCONTINUED | OUTPATIENT
Start: 2024-05-15 | End: 2024-05-20 | Stop reason: HOSPADM

## 2024-05-15 RX ORDER — ALBUTEROL SULFATE 90 UG/1
2 AEROSOL, METERED RESPIRATORY (INHALATION) EVERY 4 HOURS PRN
Status: DISCONTINUED | OUTPATIENT
Start: 2024-05-15 | End: 2024-05-20 | Stop reason: HOSPADM

## 2024-05-15 RX ORDER — SODIUM CHLORIDE 0.9 % (FLUSH) 0.9 %
10 SYRINGE (ML) INJECTION AS NEEDED
Status: DISCONTINUED | OUTPATIENT
Start: 2024-05-15 | End: 2024-05-20 | Stop reason: HOSPADM

## 2024-05-15 RX ORDER — VENLAFAXINE HYDROCHLORIDE 150 MG/1
150 CAPSULE, EXTENDED RELEASE ORAL DAILY
Status: DISCONTINUED | OUTPATIENT
Start: 2024-05-15 | End: 2024-05-20 | Stop reason: HOSPADM

## 2024-05-15 RX ORDER — LEVOTHYROXINE SODIUM 100 UG/1
250 TABLET ORAL
Status: DISCONTINUED | OUTPATIENT
Start: 2024-05-15 | End: 2024-05-20 | Stop reason: HOSPADM

## 2024-05-15 RX ORDER — VANCOMYCIN HYDROCHLORIDE 750 MG/150ML
750 INJECTION, SOLUTION INTRAVENOUS
Status: DISCONTINUED | OUTPATIENT
Start: 2024-05-16 | End: 2024-05-20 | Stop reason: HOSPADM

## 2024-05-15 RX ORDER — ATORVASTATIN CALCIUM 40 MG/1
40 TABLET, FILM COATED ORAL DAILY
Status: DISCONTINUED | OUTPATIENT
Start: 2024-05-15 | End: 2024-05-20 | Stop reason: HOSPADM

## 2024-05-15 RX ORDER — ASPIRIN 81 MG/1
81 TABLET ORAL DAILY
Status: DISCONTINUED | OUTPATIENT
Start: 2024-05-15 | End: 2024-05-20 | Stop reason: HOSPADM

## 2024-05-15 RX ORDER — ACETAMINOPHEN 325 MG/1
975 TABLET ORAL 4 TIMES DAILY PRN
Status: DISCONTINUED | OUTPATIENT
Start: 2024-05-15 | End: 2024-05-20 | Stop reason: HOSPADM

## 2024-05-15 RX ORDER — AMIODARONE HYDROCHLORIDE 200 MG/1
200 TABLET ORAL
Status: DISCONTINUED | OUTPATIENT
Start: 2024-05-15 | End: 2024-05-20 | Stop reason: HOSPADM

## 2024-05-15 RX ORDER — HEPARIN SODIUM 5000 [USP'U]/ML
5000 INJECTION, SOLUTION INTRAVENOUS; SUBCUTANEOUS EVERY 8 HOURS
Status: DISCONTINUED | OUTPATIENT
Start: 2024-05-15 | End: 2024-05-17

## 2024-05-15 RX ORDER — POLYETHYLENE GLYCOL 3350 17 G/17G
17 POWDER, FOR SOLUTION ORAL DAILY
Status: DISCONTINUED | OUTPATIENT
Start: 2024-05-15 | End: 2024-05-20 | Stop reason: HOSPADM

## 2024-05-15 RX ORDER — DEXTROSE 50 % IN WATER (D50W) INTRAVENOUS SYRINGE
25
Status: DISCONTINUED | OUTPATIENT
Start: 2024-05-15 | End: 2024-05-20 | Stop reason: HOSPADM

## 2024-05-15 RX ADMIN — CLOPIDOGREL 75 MG: 75 TABLET ORAL at 11:40

## 2024-05-15 RX ADMIN — INSULIN LISPRO 4 UNITS: 100 INJECTION, SOLUTION INTRAVENOUS; SUBCUTANEOUS at 17:51

## 2024-05-15 RX ADMIN — ATORVASTATIN CALCIUM 40 MG: 40 TABLET, FILM COATED ORAL at 11:40

## 2024-05-15 RX ADMIN — VENLAFAXINE HYDROCHLORIDE 150 MG: 150 CAPSULE, EXTENDED RELEASE ORAL at 11:44

## 2024-05-15 RX ADMIN — METOPROLOL SUCCINATE 50 MG: 50 TABLET, EXTENDED RELEASE ORAL at 11:40

## 2024-05-15 RX ADMIN — HEPARIN SODIUM 5000 UNITS: 5000 INJECTION INTRAVENOUS; SUBCUTANEOUS at 11:44

## 2024-05-15 RX ADMIN — PANTOPRAZOLE SODIUM 40 MG: 40 TABLET, DELAYED RELEASE ORAL at 19:51

## 2024-05-15 RX ADMIN — LEVOTHYROXINE SODIUM 250 MCG: 100 TABLET ORAL at 05:48

## 2024-05-15 RX ADMIN — HEPARIN SODIUM 5000 UNITS: 5000 INJECTION INTRAVENOUS; SUBCUTANEOUS at 19:46

## 2024-05-15 RX ADMIN — FERROUS GLUCONATE 324 MG: 324 TABLET ORAL at 11:41

## 2024-05-15 RX ADMIN — HEPARIN SODIUM 5000 UNITS: 5000 INJECTION INTRAVENOUS; SUBCUTANEOUS at 03:55

## 2024-05-15 RX ADMIN — AMIODARONE HYDROCHLORIDE 200 MG: 200 TABLET ORAL at 06:47

## 2024-05-15 RX ADMIN — POLYETHYLENE GLYCOL 3350 17 G: 17 POWDER, FOR SOLUTION ORAL at 11:40

## 2024-05-15 RX ADMIN — ASPIRIN 81 MG: 81 TABLET, COATED ORAL at 11:40

## 2024-05-15 RX ADMIN — PANTOPRAZOLE SODIUM 40 MG: 40 TABLET, DELAYED RELEASE ORAL at 11:40

## 2024-05-15 RX ADMIN — INSULIN LISPRO 2 UNITS: 100 INJECTION, SOLUTION INTRAVENOUS; SUBCUTANEOUS at 11:52

## 2024-05-15 SDOH — SOCIAL STABILITY: SOCIAL INSECURITY: HAVE YOU HAD ANY THOUGHTS OF HARMING ANYONE ELSE?: NO

## 2024-05-15 SDOH — SOCIAL STABILITY: SOCIAL INSECURITY: WERE YOU ABLE TO COMPLETE ALL THE BEHAVIORAL HEALTH SCREENINGS?: YES

## 2024-05-15 SDOH — SOCIAL STABILITY: SOCIAL INSECURITY: DOES ANYONE TRY TO KEEP YOU FROM HAVING/CONTACTING OTHER FRIENDS OR DOING THINGS OUTSIDE YOUR HOME?: NO

## 2024-05-15 SDOH — SOCIAL STABILITY: SOCIAL INSECURITY: ARE YOU OR HAVE YOU BEEN THREATENED OR ABUSED PHYSICALLY, EMOTIONALLY, OR SEXUALLY BY ANYONE?: NO

## 2024-05-15 SDOH — SOCIAL STABILITY: SOCIAL INSECURITY: ABUSE: ADULT

## 2024-05-15 SDOH — SOCIAL STABILITY: SOCIAL INSECURITY: HAS ANYONE EVER THREATENED TO HURT YOUR FAMILY OR YOUR PETS?: NO

## 2024-05-15 SDOH — SOCIAL STABILITY: SOCIAL INSECURITY: DO YOU FEEL UNSAFE GOING BACK TO THE PLACE WHERE YOU ARE LIVING?: NO

## 2024-05-15 SDOH — SOCIAL STABILITY: SOCIAL INSECURITY: ARE THERE ANY APPARENT SIGNS OF INJURIES/BEHAVIORS THAT COULD BE RELATED TO ABUSE/NEGLECT?: NO

## 2024-05-15 SDOH — SOCIAL STABILITY: SOCIAL INSECURITY: DO YOU FEEL ANYONE HAS EXPLOITED OR TAKEN ADVANTAGE OF YOU FINANCIALLY OR OF YOUR PERSONAL PROPERTY?: NO

## 2024-05-15 SDOH — SOCIAL STABILITY: SOCIAL INSECURITY: HAVE YOU HAD THOUGHTS OF HARMING ANYONE ELSE?: NO

## 2024-05-15 ASSESSMENT — COGNITIVE AND FUNCTIONAL STATUS - GENERAL
HELP NEEDED FOR BATHING: A LITTLE
DRESSING REGULAR LOWER BODY CLOTHING: A LITTLE
MOVING FROM LYING ON BACK TO SITTING ON SIDE OF FLAT BED WITH BEDRAILS: A LITTLE
MOVING TO AND FROM BED TO CHAIR: A LITTLE
HELP NEEDED FOR BATHING: A LITTLE
MOVING FROM LYING ON BACK TO SITTING ON SIDE OF FLAT BED WITH BEDRAILS: A LITTLE
DAILY ACTIVITIY SCORE: 20
DRESSING REGULAR LOWER BODY CLOTHING: A LITTLE
CLIMB 3 TO 5 STEPS WITH RAILING: A LOT
TOILETING: A LITTLE
TOILETING: A LITTLE
MOVING TO AND FROM BED TO CHAIR: A LITTLE
WALKING IN HOSPITAL ROOM: A LITTLE
DRESSING REGULAR UPPER BODY CLOTHING: A LITTLE
TURNING FROM BACK TO SIDE WHILE IN FLAT BAD: A LITTLE
DRESSING REGULAR UPPER BODY CLOTHING: A LITTLE
MOBILITY SCORE: 17
CLIMB 3 TO 5 STEPS WITH RAILING: A LOT
WALKING IN HOSPITAL ROOM: A LITTLE
TURNING FROM BACK TO SIDE WHILE IN FLAT BAD: A LITTLE
MOBILITY SCORE: 17
DAILY ACTIVITIY SCORE: 20
STANDING UP FROM CHAIR USING ARMS: A LITTLE
STANDING UP FROM CHAIR USING ARMS: A LITTLE
PATIENT BASELINE BEDBOUND: NO

## 2024-05-15 ASSESSMENT — ACTIVITIES OF DAILY LIVING (ADL)
HEARING - RIGHT EAR: FUNCTIONAL
FEEDING YOURSELF: INDEPENDENT
WALKS IN HOME: NEEDS ASSISTANCE
DRESSING YOURSELF: INDEPENDENT
TOILETING: INDEPENDENT
PATIENT'S MEMORY ADEQUATE TO SAFELY COMPLETE DAILY ACTIVITIES?: NO
GROOMING: INDEPENDENT
JUDGMENT_ADEQUATE_SAFELY_COMPLETE_DAILY_ACTIVITIES: YES
ADEQUATE_TO_COMPLETE_ADL: YES
BATHING: INDEPENDENT
HEARING - LEFT EAR: FUNCTIONAL
LACK_OF_TRANSPORTATION: NO

## 2024-05-15 ASSESSMENT — ENCOUNTER SYMPTOMS
FATIGUE: 0
DIAPHORESIS: 0
WOUND: 1
SHORTNESS OF BREATH: 0
WEAKNESS: 1
HEADACHES: 0
CHILLS: 0
FEVER: 0

## 2024-05-15 ASSESSMENT — LIFESTYLE VARIABLES
HOW MANY STANDARD DRINKS CONTAINING ALCOHOL DO YOU HAVE ON A TYPICAL DAY: PATIENT DOES NOT DRINK
AUDIT-C TOTAL SCORE: 0
SKIP TO QUESTIONS 9-10: 1
HOW OFTEN DO YOU HAVE 6 OR MORE DRINKS ON ONE OCCASION: NEVER
HOW OFTEN DO YOU HAVE A DRINK CONTAINING ALCOHOL: NEVER
AUDIT-C TOTAL SCORE: 0

## 2024-05-15 ASSESSMENT — PAIN SCALES - GENERAL
PAINLEVEL_OUTOF10: 0 - NO PAIN

## 2024-05-15 ASSESSMENT — COLUMBIA-SUICIDE SEVERITY RATING SCALE - C-SSRS
6. HAVE YOU EVER DONE ANYTHING, STARTED TO DO ANYTHING, OR PREPARED TO DO ANYTHING TO END YOUR LIFE?: NO
2. HAVE YOU ACTUALLY HAD ANY THOUGHTS OF KILLING YOURSELF?: NO
1. IN THE PAST MONTH, HAVE YOU WISHED YOU WERE DEAD OR WISHED YOU COULD GO TO SLEEP AND NOT WAKE UP?: NO

## 2024-05-15 ASSESSMENT — PAIN - FUNCTIONAL ASSESSMENT
PAIN_FUNCTIONAL_ASSESSMENT: 0-10
PAIN_FUNCTIONAL_ASSESSMENT: 0-10

## 2024-05-15 NOTE — CONSULTS
"Vancomycin Dosing by Pharmacy- INITIAL    Daphne Morris is a 69 y.o. year old female who Pharmacy has been consulted for vancomycin dosing for cellulitis, skin and soft tissue. Based on the patient's indication and renal status this patient will be dosed based on a goal pre-HD level of 15-25.     Renal function is currently declining/patient is on hemodialysis. Per H&, she gets dialysis Tuesdays, Thursdays, Saturdays and has not missed a session recently. Night team is not sure if she will remain on this schedule while admitted.     Visit Vitals  BP (!) 198/79 (BP Location: Left arm, Patient Position: Lying)   Pulse 79   Temp 36 °C (96.8 °F) (Temporal)   Resp 18        Lab Results   Component Value Date    CREATININE 1.51 (H) 05/14/2024    CREATININE 3.60 (H) 05/01/2024    CREATININE 3.60 (H) 04/30/2024    CREATININE 3.60 (H) 04/29/2024        Patient weight is 75.8 kg.    No results found for: \"CULTURE\"     No intake/output data recorded.  [unfilled]    Lab Results   Component Value Date    PATIENTTEMP  02/24/2024      Comment:      NOTE: Patient Results are Not Corrected for Temperature    PATIENTTEMP  02/22/2024      Comment:      NOTE: Patient Results are Not Corrected for Temperature    PATIENTTEMP  02/21/2024      Comment:      NOTE: Patient Results are Not Corrected for Temperature          Assessment/Plan     Patient has already been given a loading dose of 1,000 mg at [facility] 5/14/24 at 16:08.    Will initiate vancomycin maintenance, 750 mg after each dialysis session.    Follow-up level will be obtained prior to the 2nd HD session after first dose. Will consider checking earlier if it will be longer than 24-48 hours before 1st HD session.    Will continue to monitor renal function daily while on vancomycin and order serum creatinine at least every 48 hours if not already ordered.    Follow for continued vancomycin needs, clinical response, and signs/symptoms of toxicity.       Le Nguyen, " PharmD       Pt's plan has been altered after nephology confirmed next HD session to be on 5/16.    Collect Trough vancomycin level on 5/16 with AM labs  Will tentetively dose vancomycin 750mg on T, R, Sa at 1800 to be given only on dialysis days     Thank you for the consult.    Gamaliel Simpson, PharmD, BCPS  Clinical Pharmacy Specialist  Internal Medicine

## 2024-05-15 NOTE — CONSULTS
Consults  Referred by KADY Vee    Primary MD: Poly Diaz, APRN-CNP    Reason For Consult  Recurrent cellulitis    History Of Present Illness  Daphne Morris is a 69 y.o. female, hx of ESRD on dialysis, hx of DM, hx of HTN, hx of AF, hx of none healing wounds of the legs, she has been on oral Keflex for cellulitis, she was sent to the hospital from the dialysis center for legs redness and worsening legs wounds, no fever, minimal drainage, painful wounds, no emesis, no chest pain or sob, the wound culture with MRSA / VRE from  are N     Past Medical History  She has a past medical history of Anal fissure (10/12/2023), Anemia, ASHD (arteriosclerotic heart disease), At risk for falls, Atrial fibrillation (Multi), CHF (congestive heart failure) (Multi), CKD (chronic kidney disease), COPD (chronic obstructive pulmonary disease) (Multi), CVA (cerebral vascular accident) (Multi), Depression, Diabetes mellitus (Multi), GERD (gastroesophageal reflux disease), H/O pleural effusion, Hyperlipidemia, Hypertension, Hypothyroidism, Hypoxia, Impacted cerumen, left ear, Noncompliance, Osteoarthritis, Perianal dermatitis (10/12/2023), Personal history of other diseases of the respiratory system, PVD (peripheral vascular disease) (CMS-HCC), Renal carcinoma, right (Multi), Respiratory failure (Multi), TIA (transient ischemic attack), Vasculitis (CMS-HCC) (10/12/2023), and Weakness.    Surgical History  She has a past surgical history that includes MR angio chest w and wo IV contrast (2023); MR angio head wo IV contrast (2021);  section, classic; Shoulder surgery; Ankle surgery; Cataract extraction (Right); Nephrectomy (2021); and Cardiac catheterization (N/A, 2024).     Social History     Occupational History    Not on file   Tobacco Use    Smoking status: Former     Current packs/day: 0.50     Average packs/day: 0.5 packs/day for 55.4 years (27.7 ttl pk-yrs)     Types: Cigarettes     Start  "date: 1/1/1969     Passive exposure: Never    Smokeless tobacco: Never   Vaping Use    Vaping status: Never Used   Substance and Sexual Activity    Alcohol use: Not Currently    Drug use: Yes     Types: Marijuana    Sexual activity: Not on file     Travel History   Travel since 04/15/24    No documented travel since 04/15/24            Family History  Family History   Problem Relation Name Age of Onset    Hypertension Mother      Diabetes Father      Heart disease Father      Cancer Sister      Cancer Brother      Diabetes Daughter      Asthma Daughter      Heart attack Daughter      Diabetes Maternal Grandfather      Heart disease Paternal Grandmother      Diabetes Other      Hypertension Other      COPD Other      Other (chronic lung disease) Other       Allergies  Bee venom protein (honey bee), Citalopram, Codeine, Influenza virus vaccines, Latex, Lisinopril, Adhesive tape-silicones, Amoxicillin, Doxycycline, Oseltamivir, Phenyleph-min oil-petrolatum, Phenyleph-shark oil-glyc-pet, Phenylephrine, Prednisone, Tuberculin ppd, and Xylometazoline       There is no immunization history on file for this patient.  Pneumonia vaccine is planned  Mcgrath fall risk 86, preventive protocol was implemented  Depression screen is negative    Medications  Home medications:  Medications Prior to Admission   Medication Sig Dispense Refill Last Dose    acetaminophen (Tylenol) 500 mg tablet Take 2 tablets (1,000 mg) by mouth 2 times a day.       albuterol 90 mcg/actuation inhaler Inhale 2 puffs every 4 hours if needed.       amiodarone (Pacerone) 200 mg tablet Take 1 tablet (200 mg) by mouth once daily with breakfast.       aspirin 81 mg EC tablet Take 1 tablet (81 mg) by mouth once daily.       atorvastatin (Lipitor) 40 mg tablet Take 1 tablet (40 mg) by mouth once daily.       BD Calista 2nd Gen Pen Needle 32 gauge x 5/32\" needle USE SUBCUTANEOUSLY AS DIRECTED TWICE DAILY       cephalexin (Keflex) 500 mg capsule Take 1 capsule (500 mg) " by mouth every 8 hours for 7 days. 21 capsule 0     clopidogrel (Plavix) 75 mg tablet Take 1 tablet (75 mg) by mouth once daily.       ferrous gluconate 324 (38 Fe) MG tablet Take 1 tablet (324 mg) by mouth once daily with breakfast. 30 tablet 3     FreeStyle Shakir 14 Day Sensor kit USE AS DIRECTED TO CHECK SUGARS 3 TO 4 TIMES DAILY       FreeStyle Shakir 3 Alpha misc Use as instructed 1 each 0     FreeStyle Shakir 3 Sensor device Change sensor Q 14 days 2 each 11     HumaLOG KwikPen Insulin 100 unit/mL injection up to 15 units Subcutaneous three times a day per sliding scaleup to 15 units Subcutaneous three times a day per sliding scale 5 each 3     Lantus Solostar U-100 Insulin 100 unit/mL (3 mL) pen Inject 10 Units under the skin once daily at bedtime. 9 mL 3     levothyroxine (Synthroid, Levoxyl) 200 mcg tablet Take 1 tablet (200 mcg) by mouth once daily in the morning. Take before meals. 90 tablet 3     levothyroxine (Synthroid, Levoxyl) 50 mcg tablet Take 1 tablet (50 mcg) by mouth once daily in the morning. Take before meals. 90 tablet 3     metoprolol succinate XL (Toprol-XL) 50 mg 24 hr tablet Take 1 tablet (50 mg) by mouth once daily. Do not crush or chew.       oxygen DME/Hospice (O2) therapy Inhale 3 L/min once daily at bedtime. Indications: decreased oxygen in the tissues or blood       pantoprazole (ProtoNix) 40 mg EC tablet Take 1 tablet (40 mg) by mouth 2 times a day. 60 tablet 6     venlafaxine XR (Effexor-XR) 75 mg 24 hr capsule Take 2 capsules (150 mg) by mouth once daily. 180 capsule 3      Current medications:  Scheduled medications  amiodarone, 200 mg, oral, Daily with breakfast  aspirin, 81 mg, oral, Daily  atorvastatin, 40 mg, oral, Daily  clopidogrel, 75 mg, oral, Daily  ferrous gluconate, 324 mg, oral, Daily with breakfast  heparin (porcine), 5,000 Units, subcutaneous, q8h  insulin lispro, 0-10 Units, subcutaneous, TID  levothyroxine, 250 mcg, oral, Daily before breakfast  metoprolol  "succinate XL, 50 mg, oral, Daily  pantoprazole, 40 mg, oral, BID  polyethylene glycol, 17 g, oral, Daily  [START ON 5/16/2024] vancomycin, 750 mg, intravenous, After Dialysis  venlafaxine XR, 150 mg, oral, Daily      Continuous medications     PRN medications  PRN medications: acetaminophen, albuterol, alteplase, dextrose, dextrose, glucagon, glucagon, sodium chloride 0.9%, sodium chloride 0.9%, vancomycin    Review of Systems   All other systems reviewed and are negative.       Objective  Range of Vitals (last 24 hours)  Heart Rate:  [78-88]   Temp:  [35.9 °C (96.6 °F)-36.7 °C (98 °F)]   Resp:  [16-19]   BP: (101-198)/()   Height:  [167.6 cm (5' 5.98\")-167.6 cm (5' 6\")]   Weight:  [75.8 kg (167 lb)-76.7 kg (169 lb)]   SpO2:  [86 %-100 %]   Daily Weight  05/15/24 : 75.8 kg (167 lb)    Body mass index is 26.97 kg/m².   Nutritional consult    Physical Exam  Constitutional:       Appearance: Normal appearance.   HENT:      Head: Normocephalic and atraumatic.      Mouth/Throat:      Mouth: Mucous membranes are moist.      Pharynx: Oropharynx is clear.   Eyes:      Pupils: Pupils are equal, round, and reactive to light.   Cardiovascular:      Rate and Rhythm: Normal rate and regular rhythm.      Heart sounds: Normal heart sounds.   Pulmonary:      Effort: Pulmonary effort is normal.      Breath sounds: Normal breath sounds.      Comments: Rt sided dialysis line, no drainage or tendeerness  Abdominal:      General: Abdomen is flat. Bowel sounds are normal.      Palpations: Abdomen is soft.   Musculoskeletal:         General: Swelling present.      Cervical back: Normal range of motion.      Comments: Bilateral shin area wounds, slough, eschar, surrounding redness, tender   Skin:     Comments: Multiple wounds   Neurological:      Mental Status: She is alert.          Relevant Results  Outside Hospital Results  reviewed  Labs  Results from last 72 hours   Lab Units 05/15/24  0606 05/14/24  1518   WBC AUTO x10*3/uL 9.0 " "9.5   HEMOGLOBIN g/dL 9.9* 9.8*   HEMATOCRIT % 35.6* 33.9*   PLATELETS AUTO x10*3/uL 294 297   NEUTROS PCT AUTO %  --  67.4   LYMPHS PCT AUTO %  --  12.3   MONOS PCT AUTO %  --  10.2   EOS PCT AUTO %  --  8.9     Results from last 72 hours   Lab Units 05/15/24  0606 05/14/24  1518   SODIUM mmol/L 139 139   POTASSIUM mmol/L 3.8 4.8   CHLORIDE mmol/L 102 101   CO2 mmol/L 33* 33*   BUN mg/dL 17 15   CREATININE mg/dL 1.83* 1.51*   GLUCOSE mg/dL 101* 166*   CALCIUM mg/dL 7.7* 7.8*   ANION GAP mmol/L 8* 10   EGFR mL/min/1.73m*2 29* 37*   PHOSPHORUS mg/dL 3.1  --      Results from last 72 hours   Lab Units 05/15/24  0606 05/14/24  1518   ALK PHOS U/L  --  93   BILIRUBIN TOTAL mg/dL  --  0.4   PROTEIN TOTAL g/dL  --  6.1*   ALT U/L  --  7   AST U/L  --  20   ALBUMIN g/dL 2.5* 2.7*     Estimated Creatinine Clearance: 29.8 mL/min (A) (by C-G formula based on SCr of 1.83 mg/dL (H)).  C-Reactive Protein   Date Value Ref Range Status   05/15/2024 5.41 (H) <1.00 mg/dL Final     CRP   Date Value Ref Range Status   05/06/2021 8.9 (H) 0 - 2.0 MG/DL Final     Comment:     Performed at [address] [address]   06/04/2020 2.3 (H) 0 - 2.0 MG/DL Final     Comment:     Performed at [address] [address]     Sedimentation Rate   Date Value Ref Range Status   05/15/2024 50 (H) 0 - 30 mm/h Final   06/04/2020 47 (H) 0 - 20 MM/HR Final     Comment:     Performed at [facility] [address] [facility] [address]     No results found for: \"HIV1X2\", \"HIVCONF\", \"PGYCIH9HG\"  Hepatitis C AB   Date Value Ref Range Status   02/27/2024 Nonreactive Nonreactive Final     Comment:     Results from patients taking biotin supplements or receiving high-dose biotin therapy should be interpreted with caution due to possible interference with this test. Providers may contact their local laboratory for further information.     Microbiology  Susceptibility data from last 90 days.  Collected Specimen Info " Organism Ampicillin Clindamycin Daptomycin Erythromycin Gentamicin Synergy Linezolid Oxacillin Penicillin Tetracycline Trimethoprim/Sulfamethoxazole Vancomycin   04/29/24 Tissue/Biopsy from Wound/Tissue Enterococcus faecium R  SDD  S S  R R R R     Methicillin Resistant Staphylococcus aureus (MRSA)  R  R   R  R S S   03/04/24 Swab from Anterior Nares Methicillin Resistant Staphylococcus aureus (MRSA)              Imaging  Reviewed       Assessment/Plan     Legs none healing wounds / infection, rule out calciphylaxis     Recommendations :  Continue Vancomycin  Keep the legs elevated  Avoid trauma  Wound care    I spent minutes in the professional and overall care of this patient.      Myke Chavarria MD

## 2024-05-15 NOTE — CONSULTS
Inpatient consult to Nephrology  Consult performed by: Svetlana Santamaria MD  Consult ordered by: Benjamin Vee DO  Reason for consult: ESRD  Assessment/Recommendations: 1. ESRD on HD  2. HTNsive heart and kidney disease  3. Cellulitis  4. HTN not at goal    - HD tomorrow  - Abx per ID - dose for GFR < 15  - Follow BP for now. She is on minimal meds PTA and has had prior low BP    Pt seen. Chart reviewed. Well known to me. Here for cellulitis. Says HD has been going well

## 2024-05-15 NOTE — H&P
History Of Present Illness  Daphne Morris is a 69 y.o. female presenting with a PMHx of recurrent falls, cellulitis, CKD on dialysis, HTN, T2DM on insulin, anemia secondary to CKD, hypothyroidism, afib not on AC, and depression with anxiety who presents from [facility] for outpatient failure of abx for cellulitis. Pt states she has had recurrent episodes of trauma to the arms and legs causing ulcerative wounds that are incredibly painful. She was discharged home from the hospital 5/2 with a course of oral cephalexin and seen by home healthcare. She went to dialysis today and the dialysis nurses told her to go to the ED due to erythema of the bilateral legs and her chronic leg ulcers. Pt states worsening of the lower leg ulcers on her [birth year] after she tripped using her walker while out at a [employer] (mechanical fall). She reports no systemic systems including f/c, n/v, abdominal pain, syncope, malaise. She gets dialysis Tuesdays, Thursdays, Saturdays. Has not missed session recently.    Reviewing medical chart, she has been treated for recurrent cellulitis with media images of many ulcers present in the EMR. She has never had fever or elevated WBC. She states she has pain with her ulcers, but has not noticed swelling or spreading erythema. Denies legs are warm to the touch. A wound culture was obtained in April (unclear source) and showed MRSA with VRE.    ED course: Xray of tibia and fibula unremarkable except for soft tissue ulcer. CT C spine and head clear.  CMP with glucose 166, Cr 1.51, Ca 7.8, albumin 2.7, lactate normal, blood culture x2 obtained  CBC with Hb of 9.8  Treated with Vanc, Ceftriaxone     Past Medical History  She has a past medical history of Anal fissure (10/12/2023), Anemia, ASHD (arteriosclerotic heart disease), At risk for falls, Atrial fibrillation (Multi), CHF (congestive heart failure) (Multi), CKD (chronic kidney disease), COPD (chronic obstructive pulmonary disease)  (Multi), CVA (cerebral vascular accident) (Multi), Depression, Diabetes mellitus (Multi), GERD (gastroesophageal reflux disease), H/O pleural effusion, Hyperlipidemia, Hypertension, Hypothyroidism, Hypoxia, Impacted cerumen, left ear, Noncompliance, Osteoarthritis, Perianal dermatitis (10/12/2023), Personal history of other diseases of the respiratory system, PVD (peripheral vascular disease) (CMS-HCC), Renal carcinoma, right (Multi), Respiratory failure (Multi), TIA (transient ischemic attack), Vasculitis (CMS-HCC) (10/12/2023), and Weakness.    Surgical History  She has a past surgical history that includes MR angio chest w and wo IV contrast (2023); MR angio head wo IV contrast (2021);  section, classic; Shoulder surgery; Ankle surgery; Cataract extraction (Right); Nephrectomy (2021); and Cardiac catheterization (N/A, 2024).     Social History  She reports that she has quit smoking. Her smoking use included cigarettes. She started smoking about 55 years ago. She has a 27.7 pack-year smoking history. She has never been exposed to tobacco smoke. She has never used smokeless tobacco. She reports that she does not currently use alcohol. She reports current drug use. Drug: Marijuana.    Family History  Family History   Problem Relation Name Age of Onset    Hypertension Mother      Diabetes Father      Heart disease Father      Cancer Sister      Cancer Brother      Diabetes Daughter      Asthma Daughter      Heart attack Daughter      Diabetes Maternal Grandfather      Heart disease Paternal Grandmother      Diabetes Other      Hypertension Other      COPD Other      Other (chronic lung disease) Other          Allergies  Bee venom protein (honey bee), Citalopram, Codeine, Influenza virus vaccines, Latex, Lisinopril, Adhesive tape-silicones, Amoxicillin, Doxycycline, Oseltamivir, Phenyleph-min oil-petrolatum, Phenyleph-shark oil-glyc-pet, Phenylephrine, Prednisone, Tuberculin ppd, and  Xylometazoline    Review of Systems   Constitutional:  Negative for chills, diaphoresis, fatigue and fever.   Respiratory:  Negative for shortness of breath.    Cardiovascular:  Negative for chest pain and leg swelling.   Skin:  Positive for wound.   Neurological:  Positive for weakness. Negative for syncope and headaches.        Physical Exam  Constitutional:       Appearance: Normal appearance.   HENT:      Head: Normocephalic and atraumatic.   Cardiovascular:      Rate and Rhythm: Normal rate and regular rhythm.   Pulmonary:      Effort: Pulmonary effort is normal.      Breath sounds: Normal breath sounds.   Skin:     General: Skin is warm and dry.      Findings: Erythema present.      Comments: Numerous ulcers at sites of trauma on the bilateral arms and lower legs. Both  ulcers of the lower legs have peripheral erythema and rolled boarders with necrotic tissue bed. Lesions on the arms are older and are healing in cribiform or wrinkled paper pattern, suggestive of prior ulceration. Bilateral lower legs and upper arms both appear erythematous, not warm to the touch and without swelling or purulent drainage.   Neurological:      Mental Status: She is alert.          Last Recorded Vitals  BP (!) 198/79 (BP Location: Left arm, Patient Position: Lying)   Pulse 79   Temp 36 °C (96.8 °F) (Temporal)   Resp 18   Wt 75.8 kg (167 lb)   SpO2 98%     Relevant Results  Scheduled medications  amiodarone, 200 mg, oral, Daily with breakfast  aspirin, 81 mg, oral, Daily  atorvastatin, 40 mg, oral, Daily  clopidogrel, 75 mg, oral, Daily  ferrous gluconate, 324 mg, oral, Daily with breakfast  heparin (porcine), 5,000 Units, subcutaneous, q8h  insulin lispro, 0-10 Units, subcutaneous, TID  levothyroxine, 50 mcg, oral, Daily before breakfast  metoprolol succinate XL, 50 mg, oral, Daily  pantoprazole, 40 mg, oral, BID  polyethylene glycol, 17 g, oral, Daily  venlafaxine XR, 150 mg, oral, Daily      Continuous medications     PRN  medications  PRN medications: acetaminophen, albuterol, dextrose, dextrose, glucagon, glucagon, vancomycin  Results for orders placed or performed during the hospital encounter of 05/14/24 (from the past 24 hour(s))   CBC and Auto Differential   Result Value Ref Range    WBC 9.5 4.4 - 11.3 x10*3/uL    nRBC 0.0 0.0 - 0.0 /100 WBCs    RBC 3.46 (L) 4.00 - 5.20 x10*6/uL    Hemoglobin 9.8 (L) 12.0 - 16.0 g/dL    Hematocrit 33.9 (L) 36.0 - 46.0 %    MCV 98 80 - 100 fL    MCH 28.3 26.0 - 34.0 pg    MCHC 28.9 (L) 32.0 - 36.0 g/dL    RDW 16.0 (H) 11.5 - 14.5 %    Platelets 297 150 - 450 x10*3/uL    Neutrophils % 67.4 40.0 - 80.0 %    Immature Granulocytes %, Automated 0.4 0.0 - 0.9 %    Lymphocytes % 12.3 13.0 - 44.0 %    Monocytes % 10.2 2.0 - 10.0 %    Eosinophils % 8.9 0.0 - 6.0 %    Basophils % 0.8 0.0 - 2.0 %    Neutrophils Absolute 6.43 1.20 - 7.70 x10*3/uL    Immature Granulocytes Absolute, Automated 0.04 0.00 - 0.70 x10*3/uL    Lymphocytes Absolute 1.17 (L) 1.20 - 4.80 x10*3/uL    Monocytes Absolute 0.97 0.10 - 1.00 x10*3/uL    Eosinophils Absolute 0.85 (H) 0.00 - 0.70 x10*3/uL    Basophils Absolute 0.08 0.00 - 0.10 x10*3/uL   Comprehensive metabolic panel   Result Value Ref Range    Glucose 166 (H) 74 - 99 mg/dL    Sodium 139 136 - 145 mmol/L    Potassium 4.8 3.5 - 5.3 mmol/L    Chloride 101 98 - 107 mmol/L    Bicarbonate 33 (H) 21 - 32 mmol/L    Anion Gap 10 10 - 20 mmol/L    Urea Nitrogen 15 6 - 23 mg/dL    Creatinine 1.51 (H) 0.50 - 1.05 mg/dL    eGFR 37 (L) >60 mL/min/1.73m*2    Calcium 7.8 (L) 8.6 - 10.3 mg/dL    Albumin 2.7 (L) 3.4 - 5.0 g/dL    Alkaline Phosphatase 93 33 - 136 U/L    Total Protein 6.1 (L) 6.4 - 8.2 g/dL    AST 20 9 - 39 U/L    Bilirubin, Total 0.4 0.0 - 1.2 mg/dL    ALT 7 7 - 45 U/L   Lactate   Result Value Ref Range    Lactate 0.8 0.4 - 2.0 mmol/L   Blood Culture    Specimen: Peripheral Venipuncture; Blood culture   Result Value Ref Range    Blood Culture Loaded on Instrument - Culture in  progress    Blood Culture    Specimen: Peripheral Venipuncture; Blood culture   Result Value Ref Range    Blood Culture Loaded on Instrument - Culture in progress      CT head W O contrast trauma protocol    Result Date: 5/14/2024  Interpreted By:  Washington Peraza, STUDY: CT HEAD W/O CONTRAST TRAUMA PROTOCOL; CT CERVICAL SPINE WO IV CONTRAST;  5/14/2024 2:56 pm   INDICATION: Signs/Symptoms:fall on thinners   COMPARISON: 04/06/2021   ACCESSION NUMBER(S): [id number]; [id number]   ORDERING CLINICIAN: MALKA RODGERS   TECHNIQUE: Axial noncontrast CT images of head with coronal and sagittal reconstructed images. Axial noncontrast CT images of the cervical spine with coronal and sagittal reconstructed images.   FINDINGS: CT HEAD:   There is mild motion degradation. Global volume loss. No evidence of acute hemorrhage. No mass effect. No midline shift.     CT CERVICAL SPINE:   Motion degraded examination. Evidence of acute cervical spine fracture. Demineralization of the bones. Mild degenerative changes.       Assessment limited due to motion degradation   CT HEAD: No acute intracranial abnormality or calvarial fracture. Senescent changes   CT CERVICAL SPINE: No acute fracture or traumatic malalignment of the cervical spine. No findings of acute cervical spine fracture. Demineralization   Signed by: Washington Peraza 5/14/2024 3:11 PM Dictation workstation:   [id number]    CT cervical spine wo IV contrast    Result Date: 5/14/2024  Interpreted By:  Washington Peraza, STUDY: CT HEAD W/O CONTRAST TRAUMA PROTOCOL; CT CERVICAL SPINE WO IV CONTRAST;  5/14/2024 2:56 pm   INDICATION: Signs/Symptoms:fall on thinners   COMPARISON: 04/06/2021   ACCESSION NUMBER(S): [id number]; [id number]   ORDERING CLINICIAN: MALKA RODGERS   TECHNIQUE: Axial noncontrast CT images of head with coronal and sagittal reconstructed images. Axial noncontrast CT images of the cervical spine with coronal and sagittal reconstructed images.   FINDINGS: CT  HEAD:   There is mild motion degradation. Global volume loss. No evidence of acute hemorrhage. No mass effect. No midline shift.     CT CERVICAL SPINE:   Motion degraded examination. Evidence of acute cervical spine fracture. Demineralization of the bones. Mild degenerative changes.       Assessment limited due to motion degradation   CT HEAD: No acute intracranial abnormality or calvarial fracture. Senescent changes   CT CERVICAL SPINE: No acute fracture or traumatic malalignment of the cervical spine. No findings of acute cervical spine fracture. Demineralization   Signed by: Washington Peraza 5/14/2024 3:11 PM Dictation workstation:   [id number]    XR tibia fibula right 2 views    Result Date: 5/14/2024  Interpreted By:  Erlin Lui, STUDY: XR TIBIA FIBULA RIGHT 2 VIEWS;  5/14/2024 2:15 pm   INDICATION: Signs/Symptoms:cellulitis around chronic wound on the right leg.   COMPARISON: Previous exam from 04/29/2024   ACCESSION NUMBER(S): [id number]   ORDERING CLINICIAN: MALKA RODGERS   TECHNIQUE: AP and lateral views  of the  right lower leg were obtained.   FINDINGS: Remote distal fibular fracture with stable fixation hardware in place. Persistent anterior mid right shin skin ulceration with mild adjacent soft tissue swelling. No soft tissue gas density. No significant osteophytic change. No lytic or blastic destructive bone lesion. No acute fracture or dislocation. No opaque soft tissue foreign body. No periosteal reaction or erosion.       Stable appearance of fixated distal fibular fracture.   Persistent mid anterior right shin skin ulceration with surrounding edema.   No associated soft tissue gas density or osseous lesion.   MACRO: None   Signed by: Erlin Lui 5/14/2024 2:25 PM Dictation workstation:   [id number]    IR CVC tunneled    Result Date: 5/3/2024  Interpreted By:  George Crane, STUDY: IR CVC TUNNELED; 5/1/2024 2:20 pm   INDICATION: End-stage renal disease requiring hemodialysis, referred for  insertion of tunneled central venous catheter.   COMPARISON: None   ACCESSION NUMBER(S): [id number]   ORDERING CLINICIAN: LALA ONEILL   TECHNIQUE: Conscious sedation: 20 minutes Ultrasound-guided vascular access Fluoroscopy guided insertion of tunneled central venous catheter without port Fluoroscopy time:0.1 minute Images: 2 Dose: 0.36 mGy air kerma   FINDINGS: Informed consent obtained. Patient positioned supine and connected to physiologic monitoring.  All elements of maximal sterile barrier were utilized including cap, mask, sterile gown, sterile gloves, large sterile drape, hand scrub, 2% chlorhexidine for skin cleaning and sterile ultrasound guidance. Ultrasound of right neck demonstrated patent internal jugular vein. Under ultrasound guidance, access into the vein was established with micro puncture and permanent image archived. Small dermatotomy made few cm below clavicle. A 14.5 Slovak dual lumen cuffed hemodialysis catheter (Palindrome) was tunneled from dermatotomy to venotomy. Introducer sheath exchanged for a peel-away sheath. Distal end of the tunneled catheter advanced centrally through the peel-away sheath. Fluoroscopy confirmed tip of catheter near superior cavo-atrial junction. The catheter aspirated and flushed freely, locked with heparin, secured, and covered with dressing. Patient tolerated procedure well.       Ultrasound and fluoroscopy guided insertion of tunneled central venous catheter for hemodialysis.       Signed by: George Crane 5/3/2024 11:29 AM Dictation workstation:   [id number]    XR tibia fibula right 2 views    Result Date: 4/29/2024  Interpreted By:  Rebeka James, STUDY: XR TIBIA FIBULA RIGHT 2 VIEWS; ;  4/29/2024 6:08 pm   INDICATION: Signs/Symptoms:Pain swelling injury.   COMPARISON: None.   ACCESSION NUMBER(S): [id number]   ORDERING CLINICIAN: LALA ONEILL   FINDINGS: Two views of the tibia/fibula are obtained.   No acute fracture. Generalized diffuse osteopenia.  Postsurgical changes of distal fibular fracture fixation with plate and multiple screws. No evidence for heart where complication or lucency to suggest loosening. Posterior and plantar calcaneal spurring noted. Diffuse soft tissue swelling. Vascular calcifications are present.       No acute fracture. Generalized diffuse osteopenia.   Postsurgical and degenerative changes as noted above. Diffuse soft tissue swelling.   MACRO: None   Signed by: Rebeka James 4/29/2024 6:25 PM Dictation workstation:   [id number]    XR forearm right 2 views    Result Date: 4/29/2024  Interpreted By:  Rebeka James, STUDY: XR FOREARM RIGHT 2 VIEWS; ;  4/29/2024 6:08 pm   INDICATION: Signs/Symptoms:Pain and swelling.   COMPARISON: None.   ACCESSION NUMBER(S): [id number]   ORDERING CLINICIAN: LALA ONEILL   FINDINGS: Two views of the forearm are obtained.   No acute fracture or dislocation. Generalized diffuse osteopenia. Diffuse soft tissue swelling.       No acute fracture. Diffuse soft tissue swelling.     MACRO: None   Signed by: Rebeka James 4/29/2024 6:22 PM Dictation workstation:   [id number]    XR shoulder right 2+ views    Result Date: 4/29/2024  Interpreted By:  Rebeka James, STUDY: XR SHOULDER RIGHT 2+ VIEWS; ;  4/29/2024 6:08 pm   INDICATION: Signs/Symptoms:Pain and swelling limited range of motion.   COMPARISON: None.   ACCESSION NUMBER(S): [id number]   ORDERING CLINICIAN: LALA ONEILL   FINDINGS: Three views of the shoulder are obtained. No acute fracture or dislocation. Generalized diffuse osteopenia. Mild degenerative changes of the AC joint. Soft tissues are unremarkable.       No acute fracture. Generalized diffuse osteopenia.   Mild right shoulder osteoarthrosis.   MACRO: None   Signed by: Rebeka James 4/29/2024 6:21 PM Dictation workstation:   [id number]    XR chest 2 views    Result Date: 4/29/2024  Interpreted By:  Rebeka James, STUDY: XR CHEST 2 VIEWS;  4/29/2024 6:08 pm   INDICATION:  Signs/Symptoms:shortness of breath.   COMPARISON: Chest x-ray 04/27/202   ACCESSION NUMBER(S): [id number]   ORDERING CLINICIAN: LALA ONEILL   FINDINGS:     CARDIOMEDIASTINAL SILHOUETTE: Cardiac silhouette is enlarged but stable. Mild pulmonary vascular congestion. Left atrial appendage occluder device noted. Atherosclerotic calcification of the aorta.   LUNGS: There is mild blunting of the right costophrenic angle with right basilar opacity likely relate to layering effusion and atelectasis. This is minimally improved from prior exam. Left lung is clear. No pneumothorax.   ABDOMEN: No remarkable upper abdominal findings.   BONES: Multilevel degenerative changes of the spine.       Cardiomegaly with mild pulmonary vascular congestion. Attention on continued follow-up is advised.   Small right pleural effusion and atelectasis, slightly improved from prior exam. Superimposed infection not excluded. Correlate clinically.   MACRO: None   Signed by: Rebeka James 4/29/2024 6:19 PM Dictation workstation:   [id number]    Vascular US upper extremity venous duplex right    Result Date: 4/29/2024  Interpreted By:  Danny Valle, STUDY: [facility] US UPPER EXTREMITY VENOUS DUPLEX RIGHT  4/29/2024 5:44 pm   INDICATION: 67 y/o   F with  Signs/Symptoms:Pain and swelling.   COMPARISON: None.   ACCESSION NUMBER(S): [id number]   ORDERING CLINICIAN: LALA ONEILL   TECHNIQUE: Routine ultrasound of the  right upper extremity was performed with duplex Doppler (color and spectral) evaluation.   Static images were obtained for remote interpretation.   FINDINGS: UPPER EXTREMITY VEINS:  Examination was performed with color and duplex Doppler.   The internal jugular, subclavian, and axillary veins are patent and free of thrombus.  The visualized brachiocephalic veins are patent. The brachial, basilic, and cephalic veins are patent and compressible.   Complex hypoechoic structure in the right forearm located couple of mm deep to the skin  surface suggestive of a complex cyst. No internal flow.       No thrombus in the central veins of the  right upper extremity.   Findings suggestive of a complex cyst in the right forearm/elbow crease.   MACRO: None   Signed by: Danny Valle 4/29/2024 5:50 PM Dictation workstation:   [id number]    Lower extremity venous duplex right    Result Date: 4/29/2024  Interpreted By:  Danny Valle, STUDY: VAS US LOWER EXTREMITY VENOUS DUPLEX RIGHT  4/29/2024 5:44 pm   INDICATION: 67 y/o   F with  Signs/Symptoms:Cellulitis swelling status post injury. LMP:  Unknown.   COMPARISON: None.   ACCESSION NUMBER(S): [id number]   ORDERING CLINICIAN: LALA ONEILL   TECHNIQUE: Routine ultrasound of the  right lower extremity was performed with duplex Doppler (color and spectral) evaluation.   Static images were obtained for remote interpretation.   FINDINGS: THIGH VEINS:  The common femoral, femoral, popliteal, proximal medial saphenous, and deep femoral veins are patent and free of thrombus. The veins are normally compressible.  They demonstrate normal phasic flow and augmentation response.   CALF VEINS: Not visualized due to edema.       No deep venous thrombosis of the visualized right lower extremity.   MACRO: None   Signed by: Danny Valle 4/29/2024 5:48 PM Dictation workstation:   [id number]    ECG 12 lead    Result Date: 4/29/2024   Normal sinus rhythm Left axis deviation  Left bundle branch block  Abnormal ECG When compared with ECG ofÂ 14-MAR-2024 05:52,  No significant change was foundÂ  Â  Confirmed by Nathaniel Tafoya ([id number]) on 4/29/2024 11:16:09 AM    XR elbow right 3+ views    Result Date: 4/27/2024  Interpreted By:  Marsha Donaldson, STUDY: XR ELBOW RIGHT 3+ VIEWS; ;  4/27/2024 8:45 am   INDICATION: Signs/Symptoms:fall.   COMPARISON: None.   ACCESSION NUMBER(S): [id number]   ORDERING CLINICIAN: DORIS REYNOSO   FINDINGS: Three views of the right elbow show no fracture or dislocation. There is no elbow joint  effusion no radiopaque foreign body is noted. Minimal degenerative changes with spurring noted medially and laterally on the distal humerus       No acute bony abnormalities     MACRO: None   Signed by: Marsha Donaldson 4/27/2024 9:21 AM Dictation workstation:   [id number]    XR chest 1 view    Result Date: 4/27/2024  Interpreted By:  Marsha Donaldson, STUDY: XR CHEST 1 VIEW 4/27/2024 8:45 am   INDICATION: Signs/Symptoms:fall   COMPARISON: 03/21/2024   ACCESSION NUMBER(S): [id number]   ORDERING CLINICIAN: DORIS REYNOSO   TECHNIQUE: AP view   FINDINGS: Since the prior exam the dialysis catheter is no longer present. There is a small right pleural effusion. There is an enlarged cardiac silhouette with pulmonary venous congestion. Radiopaque device overlies the left atrial appendage. No sign of pneumothorax.       1. Dialysis catheter is no longer seen 2. Small right pleural effusion and pulmonary venous congestion with enlarged cardiac silhouette     Signed by: Marsha Donaldson 4/27/2024 9:20 AM Dictation workstation:   [id number]          Assessment/Plan   Principal Problem:    Cellulitis    Daphne Morris is a 69 y.o. female presenting with a PMHx of recurrent falls, cellulitis, CKD on dialysis, HTN, T2DM on insulin, anemia secondary to CKD, hypothyroidism, afib not on AC, and depression with anxiety who presents from [facility] for outpatient failure of abx for cellulitis.    #Cellulitis? Vs. Pyoderma Gangrenosum vs. Ecthyma Gangrenosum vs. Other ulcerative inflammatory skin disease  ::Pt treated repeatedly with abx for suspected cellulitis in R lower extremity, however pt with additional ulceration on L leg and bilateral leg erythema. Pt notably without white count, systemic symptoms on admission, and all extremities with background of erythema. Inflammatory ulcers could be secondarily infected.  ::Numerous ulcers on both upper and lower extremities in various stages of healing without improvement with abx treatment are  concerning for ulcerative or blistering disease in a CKD patient  ::Pyoderma gangrenosum is in the differential as ulcers are exquisitely painful with erythematous, rolled boarders and necrotic tissue bed occurring at sites of prior trauma. One healed ulcer suggestive of characteristic cribriform scar (see prior media images)  -follow blood cultures  -wound care nurse consulted  -consider wound culture  -do not advise wound debridement, as it is an absolute contraindication in pyoderma gangrenosum  -consider outpatient dermatology follow up or inpatient evaluation, pt will require skin biopsy of ulcer  -continue with IV Vanc for possible MRSA Cellulitis  -follow CBC  -ESR and CRP ordered  -pain control with tylenol PRN    Chronic Medical Issues:   #CKD  -dialysis T, R, Saturday  -daily RFP    #HTN #HLD  -continue aspirin, clopidogrel    #CHF  -managed with dialysis, not on diuretics    #paroxysmal afib  -continue home amiodarone  -not on anticoagulation    #T2DM on insulin  -SSI #2  -holding home lantus (10 units nightly) in setting of possible infection, may require reduced dose    #Anemia  -secondary to CKD, stable  -continue home ferrous gluconate    #Hypothyroidism  -continue home levothyroxine    #Depression/Anxiety  -continue home venlafaxine    #GERD  -continue home pantoprazole     F: prn  E: prn  N: diabetic diet  DVT ppx: heparin subcutaneous  GI ppx: home pantoprazole  Code status DNR/DNI    Yudelka Berumen MD

## 2024-05-15 NOTE — TELEPHONE ENCOUNTER
Patient is active with [facility] and followed by Poly Diaz NP. Patient admitted to [facility] 5/15/24 for OP failure of ATB for cellulitis. Per H/P: Patient reported recurrent episodes of trauma to the arms and legs causing ulcerative wounds that are incredibly painful. Cellulitis vs. Pyoderma Gangrenosum vs. Ecthyma Gangrenosum vs. other ulcerative inflammatory skin disease, Wound care nurse consulted. Consider dermatology evaluation. Patient will require skin biopsy of ulcer. IV Vanco ordered. Will monitor hospitalization and discharge plan.

## 2024-05-15 NOTE — CARE PLAN
The patient's goals for the shift include rest comfortably    The clinical goals for the shift include decreased leg pain    Over the shift, the patient did not make progress toward the following goals. Barriers to progression include . Recommendations to address these barriers include   Problem: Pain - Adult  Goal: Verbalizes/displays adequate comfort level or baseline comfort level  Outcome: Progressing   .

## 2024-05-15 NOTE — PROGRESS NOTES
"  Subjective    Overnight Events: Patient admitted overnight    This morning, patient denies fevers, chills, sweats. States pain is present but well-controlled. Has no other complaints this AM.     ROS: 12 points review of system is negative except as stated in the HPI above.     Objective    Vitals  Visit Vitals  BP (!) 196/83 (BP Location: Left arm, Patient Position: Sitting)   Pulse 78   Temp 35.9 °C (96.6 °F) (Temporal)   Resp 19   Ht 1.676 m (5' 5.98\")   Wt 75.8 kg (167 lb)   SpO2 98%   BMI 26.97 kg/m²   Smoking Status Former   BSA 1.88 m²       Physical Exam   Constitutional:       Appearance: Normal appearance.   HENT:      Head: Normocephalic and atraumatic.   Cardiovascular:      Rate and Rhythm: Normal rate and regular rhythm.   Pulmonary:      Effort: Pulmonary effort is normal.      Breath sounds: Normal breath sounds.   Skin:     General: Skin is warm and dry.      Findings: Erythema present.      Comments: Numerous ulcers at sites of trauma on the bilateral arms and lower legs. Both  ulcers of the lower legs have peripheral erythema and rolled boarders with necrotic tissue bed. Lesions on the arms are older and are healing in cribiform or wrinkled paper pattern, suggestive of prior ulceration. Bilateral lower legs and upper arms both appear erythematous, not warm to the touch and without swelling or purulent drainage.   Neurological:      Mental Status: She is alert.          IOs    Intake/Output Summary (Last 24 hours) at 5/15/2024 1020  Last data filed at 5/15/2024 0631  Gross per 24 hour   Intake --   Output 110 ml   Net -110 ml       Labs:   Results from last 72 hours   Lab Units 05/15/24  0606 05/14/24  1518   SODIUM mmol/L 139 139   POTASSIUM mmol/L 3.8 4.8   CHLORIDE mmol/L 102 101   CO2 mmol/L 33* 33*   BUN mg/dL 17 15   CREATININE mg/dL 1.83* 1.51*   GLUCOSE mg/dL 101* 166*   CALCIUM mg/dL 7.7* 7.8*   ANION GAP mmol/L 8* 10   EGFR mL/min/1.73m*2 29* 37*   PHOSPHORUS mg/dL 3.1  --       Results " "from last 72 hours   Lab Units 05/15/24  0606 05/14/24  1518   WBC AUTO x10*3/uL 9.0 9.5   HEMOGLOBIN g/dL 9.9* 9.8*   HEMATOCRIT % 35.6* 33.9*   PLATELETS AUTO x10*3/uL 294 297   NEUTROS PCT AUTO %  --  67.4   LYMPHS PCT AUTO %  --  12.3   MONOS PCT AUTO %  --  10.2   EOS PCT AUTO %  --  8.9      Lab Results   Component Value Date    CALCIUM 7.7 (L) 05/15/2024    PHOS 3.1 05/15/2024      Lab Results   Component Value Date    CRP 5.41 (H) 05/15/2024      [unfilled]     Micro/ID:   Susceptibility data from last 90 days.  Collected Specimen Info Organism Ampicillin Clindamycin Daptomycin Erythromycin Gentamicin Synergy Linezolid Oxacillin Penicillin Tetracycline Trimethoprim/Sulfamethoxazole Vancomycin   04/29/24 Tissue/Biopsy from Wound/Tissue Enterococcus faecium R  SDD  S S  R R R R     Methicillin Resistant Staphylococcus aureus (MRSA)  R  R   R  R S S   03/04/24 Swab from Anterior Nares Methicillin Resistant Staphylococcus aureus (MRSA)                               No lab exists for component: \"AGALPCRNB\"   .ID  Lab Results   Component Value Date    URINECULTURE No significant growth 04/29/2024    BLOODCULT Loaded on Instrument - Culture in progress 05/14/2024    BLOODCULT Loaded on Instrument - Culture in progress 05/14/2024       Images  CT head W O contrast trauma protocol, CT cervical spine wo IV contrast  Narrative: Interpreted By:  Washington Peraza,   STUDY:  CT HEAD W/O CONTRAST TRAUMA PROTOCOL; CT CERVICAL SPINE WO IV  CONTRAST;  5/14/2024 2:56 pm      INDICATION:  Signs/Symptoms:fall on thinners      COMPARISON:  04/06/2021      ACCESSION NUMBER(S):  [id number]; [id number]      ORDERING CLINICIAN:  MALKA RODGERS      TECHNIQUE:  Axial noncontrast CT images of head with coronal and sagittal  reconstructed images. Axial noncontrast CT images of the cervical  spine with coronal and sagittal reconstructed images.      FINDINGS:  CT HEAD:      There is mild motion degradation. Global volume loss. No " evidence of  acute hemorrhage. No mass effect. No midline shift.          CT CERVICAL SPINE:      Motion degraded examination. Evidence of acute cervical spine  fracture. Demineralization of the bones. Mild degenerative changes.      Impression: Assessment limited due to motion degradation      CT HEAD:  No acute intracranial abnormality or calvarial fracture.  Senescent changes      CT CERVICAL SPINE:  No acute fracture or traumatic malalignment of the cervical spine.  No findings of acute cervical spine fracture. Demineralization      Signed by: Washington Peraza 5/14/2024 3:11 PM  Dictation workstation:   [id number]  XR tibia fibula right 2 views  Narrative: Interpreted By:  Erlin Lui,   STUDY:  XR TIBIA FIBULA RIGHT 2 VIEWS;  5/14/2024 2:15 pm      INDICATION:  Signs/Symptoms:cellulitis around chronic wound on the right leg.      COMPARISON:  Previous exam from 04/29/2024      ACCESSION NUMBER(S):  [id number]      ORDERING CLINICIAN:  MALKA RODGERS      TECHNIQUE:  AP and lateral views  of the  right lower leg were obtained.      FINDINGS:  Remote distal fibular fracture with stable fixation hardware in  place. Persistent anterior mid right shin skin ulceration with mild  adjacent soft tissue swelling. No soft tissue gas density. No  significant osteophytic change. No lytic or blastic destructive bone  lesion. No acute fracture or dislocation.  No opaque soft tissue foreign body.  No periosteal reaction or erosion.      Impression: Stable appearance of fixated distal fibular fracture.      Persistent mid anterior right shin skin ulceration with surrounding  edema.      No associated soft tissue gas density or osseous lesion.      MACRO:  None      Signed by: Erlin Lui 5/14/2024 2:25 PM  Dictation workstation:   [id number]      Meds  Scheduled medications  amiodarone, 200 mg, oral, Daily with breakfast  aspirin, 81 mg, oral, Daily  atorvastatin, 40 mg, oral, Daily  clopidogrel, 75 mg, oral,  Daily  ferrous gluconate, 324 mg, oral, Daily with breakfast  heparin (porcine), 5,000 Units, subcutaneous, q8h  insulin lispro, 0-10 Units, subcutaneous, TID  levothyroxine, 250 mcg, oral, Daily before breakfast  metoprolol succinate XL, 50 mg, oral, Daily  pantoprazole, 40 mg, oral, BID  polyethylene glycol, 17 g, oral, Daily  [START ON 5/16/2024] vancomycin, 750 mg, intravenous, After Dialysis  venlafaxine XR, 150 mg, oral, Daily      Continuous medications     PRN medications  PRN medications: acetaminophen, albuterol, alteplase, dextrose, dextrose, glucagon, glucagon, sodium chloride 0.9%, sodium chloride 0.9%, vancomycin     Assessment and Plan    Principal Problem:    Cellulitis     Daphne Morris is a 69 y.o. female presenting with a PMHx of recurrent falls, cellulitis, CKD on dialysis, HTN, T2DM on insulin, anemia secondary to CKD, hypothyroidism, afib not on AC, and depression with anxiety who presents from [facility] for outpatient failure of abx for cellulitis.     #Cellulitis? Vs. Pyoderma Gangrenosum vs. Ecthyma Gangrenosum vs. Calciphylaxis vs. Other ulcerative inflammatory skin disease  ::Pt treated repeatedly with abx for suspected cellulitis in R lower extremity, however pt with additional ulceration on L leg and bilateral leg erythema. Pt notably without white count, systemic symptoms on admission, and all extremities with background of erythema. Inflammatory ulcers could be secondarily infected.  ::Numerous ulcers on both upper and lower extremities in various stages of healing without improvement with abx treatment are concerning for ulcerative or blistering disease in a CKD patient  ::Pyoderma gangrenosum is in the differential as ulcers are exquisitely painful with erythematous, rolled boarders and necrotic tissue bed occurring at sites of prior trauma. One healed ulcer suggestive of characteristic cribriform scar (see prior media images)  -follow blood cultures  -wound care nurse  consulted  -consider wound culture  -do not advise wound debridement, as it is an absolute contraindication in pyoderma gangrenosum  -consider outpatient dermatology follow up or inpatient evaluation, pt will require skin biopsy of ulcer  -continue with IV Vanc for possible MRSA Cellulitis  -follow CBC  -ESR and CRP ordered->elevated   -pain control with tylenol PRN  -Consulted ID, mally recs     Chronic Medical Issues:   #CKD  -dialysis T, R, Saturday  -daily RFP  -Consulted Nephrology      #HTN #HLD  -continue aspirin, clopidogrel  -Continue metoprolol     #CHF  -managed with dialysis, not on diuretics     #paroxysmal afib  -continue home amiodarone  -not on anticoagulation     #T2DM on insulin  -SSI #2  -holding home lantus (10 units nightly) in setting of possible infection, may require reduced dose     #Anemia  -secondary to CKD, stable  -continue home ferrous gluconate     #Hypothyroidism  -continue home levothyroxine     #Depression/Anxiety  -continue home venlafaxine     #GERD  -continue home pantoprazole      F: prn  E: prn  N: diabetic diet  DVT ppx: heparin subcutaneous  GI ppx: home pantoprazole  Code status DNR/DNI      Disclaimer: Documentation completed with the information available at the time of input. The times in the chart may not be reflective of actual patient care times, interventions, or procedures. Documentation occurs after the physical care of the patient.     Carolyn Hunter MD  PGY-1  Transitional Year Resident

## 2024-05-15 NOTE — ED PROVIDER NOTES
HPI   Chief Complaint   Patient presents with    Fall     Fall while getting out of bed, skin tears on lower legs and left pinky finger       History of present illness:  Daphne Morris is a 69 y.o. female presenting with a PMHx of recurrent falls, cellulitis, CKD on dialysis, HTN, T2DM on insulin, anemia secondary to CKD, hypothyroidism, afib not on AC, and depression with anxiety who presents from [facility] for outpatient failure of abx for cellulitis. Pt states she has had recurrent episodes of trauma to the arms and legs causing ulcerative wounds that are incredibly painful. She was discharged home from the hospital 5/2 with a course of oral cephalexin and seen by home healthcare. She went to dialysis today and the dialysis nurses told her to go to the ED due to erythema of the bilateral legs and her chronic leg ulcers the patient states that the wound has been there for the past few weeks specially on the right shin and she states that she has been treating at home and having home health care wound nurse that was coming and changing the dressing on a regular basis..  She states that she has no other symptoms time in particular she denies any fevers or chills or bodyaches or nausea or other symptoms.  She states that she did have a fall today after leaving dialysis and that she did bump her head unfortunately.    Social history: Negative for alcohol and drug use.    Review of systems:   Gen.: No weight loss, fatigue, anorexia, insomnia, fever.   Eyes: No vision loss, double vision  ENT: No pharyngitis, neck pain, headache  Cardiac: No chest pain, palpitations, syncope, near syncope.   Pulmonary: No shortness of breath, cough, hemoptysis.   Heme/lymph: No swollen glands, fever, bleeding.   GI: No abdominal pain, change in bowel habits, melena, hematemesis, hematochezia, nausea, vomiting, diarrhea.   : No discharge, dysuria, frequency, urgency, hematuria.   Musculoskeletal: No  joint pain, joint swelling.    Skin: No rashes.   Review of systems is otherwise negative unless stated above or in history of present illness.      Physical exam:  General: Vitals noted, no distress. Afebrile.   EENT: No lymphadenopathy appreciated, atraumatic, normocephalic scalp, no obvious signs of trauma present across the face  Cardiac: Regular, rate, rhythm, no murmur.   Pulmonary: Lungs clear bilaterally with good aeration. No adventitious breath sounds.   Abdomen: Soft, nonsurgical. Nontender. No peritoneal signs. Normoactive bowel sounds.   Extremities: On the right shin there is a large open wound at this time that is ulcerated with surrounding cellulitis spreading up the leg, it is warm and tender to touch there is no drainage present, the wound measures 5 cm x 3 cm  Skin: No rash.   Neuro: No focal neurologic deficits      Medical decision making:   Testing: CBC CMP lactate laboratory testing shows chronic kidney disease but no other acute findings CT scan head and neck does not show any acute findings as interpreted by radiology, x-rays of the right shin did not show any concerns for osteomyelitis as interpreted by radiology  Treatment: IV antibiotics Zosyn and vancomycin started  Reevaluation:   Plan:  Daphne Morris is a 69 y.o. female presenting with a PMHx of recurrent falls, cellulitis, CKD on dialysis, HTN, T2DM on insulin, anemia secondary to CKD, hypothyroidism, afib not on AC, and depression with anxiety who presents from [facility] for outpatient failure of abx for cellulitis. Pt states she has had recurrent episodes of trauma to the arms and legs causing ulcerative wounds that are incredibly painful. She was discharged home from the hospital 5/2 with a course of oral cephalexin and seen by home healthcare. She went to dialysis today and the dialysis nurses told her to go to the ED due to erythema of the bilateral legs and her chronic leg ulcers the patient states that the wound has been there for the past few weeks  specially on the right shin and she states that she has been treating at home and having home health care wound nurse that was coming and changing the dressing on a regular basis..  She states that she has no other symptoms time in particular she denies any fevers or chills or bodyaches or nausea or other symptoms.  She states that she did have a fall today after leaving dialysis and that she did bump her head unfortunately. EENT: No lymphadenopathy appreciated, atraumatic, normocephalic scalp, no obvious signs of trauma present across the face., Extremities: On the right shin there is a large open wound at this time that is ulcerated with surrounding cellulitis spreading up the leg, it is warm and tender to touch there is no drainage present, the wound measures 5 cm x 3 cm.  I explained to the patient that she need to be transferred at this time for further care and treatment she was agreeable to plan.  She is excepted at [facility] for further care and treatment.  Impression:   1.  Right leg ulceration  2.  Cellulitis              EKG taken on 1421 May 14, 2024, rate of 86 sinus rhythm with first-degree AV block, pulmonary disease pattern, left anterior fascicular block, no STEMI present                          Lee Coma Scale Score: 15                     Patient History   Past Medical History:   Diagnosis Date    Anal fissure 10/12/2023    Anemia     ASHD (arteriosclerotic heart disease)     At risk for falls     Atrial fibrillation (Multi)     CHF (congestive heart failure) (Multi)     CKD (chronic kidney disease)     COPD (chronic obstructive pulmonary disease) (Multi)     CVA (cerebral vascular accident) (Multi)     2021- right-sided weakness    Depression     Diabetes mellitus (Multi)     GERD (gastroesophageal reflux disease)     H/O pleural effusion     Hyperlipidemia     Hypertension     Hypothyroidism     Hypoxia     Impacted cerumen, left ear     Impacted cerumen of left ear    Noncompliance      Osteoarthritis     Perianal dermatitis 10/12/2023    Personal history of other diseases of the respiratory system     History of acute bronchitis    PVD (peripheral vascular disease) (CMS-HCC)     Renal carcinoma, right (Multi)     s/p partial nephrectomy 2021    Respiratory failure (Multi)     TIA (transient ischemic attack)     Vasculitis (CMS-[facility]) 10/12/2023    Weakness      Past Surgical History:   Procedure Laterality Date    ANKLE SURGERY          CARDIAC CATHETERIZATION N/A 2024    Procedure: Right Heart Cath;  Surgeon: Nicholas Antonio MD;  Location: [facility] Cardiac Cath Lab;  Service: Cardiovascular;  Laterality: N/A;    CATARACT EXTRACTION Right     2019     SECTION, CLASSIC      MR CHEST ANGIO W AND WO IV CONTRAST  2023    MR CHEST ANGIO W AND WO IV CONTRAST 2023 [facility] MRI    MR HEAD ANGIO WO IV CONTRAST  2021    MR HEAD ANGIO WO IV CONTRAST [facility]    NEPHRECTOMY  2021    SHOULDER SURGERY           Family History   Problem Relation Name Age of Onset    Hypertension Mother      Diabetes Father      Heart disease Father      Cancer Sister      Cancer Brother      Diabetes Daughter      Asthma Daughter      Heart attack Daughter      Diabetes Maternal Grandfather      Heart disease Paternal Grandmother      Diabetes Other      Hypertension Other      COPD Other      Other (chronic lung disease) Other       Social History     Tobacco Use    Smoking status: Former     Current packs/day: 0.50     Average packs/day: 0.5 packs/day for 55.4 years (27.7 ttl pk-yrs)     Types: Cigarettes     Start date: 1969     Passive exposure: Never    Smokeless tobacco: Never   Vaping Use    Vaping status: Never Used   Substance Use Topics    Alcohol use: Not Currently    Drug use: Yes     Types: Marijuana       Physical Exam   ED Triage Vitals [24 1343]   Temperature Heart Rate Respirations BP   36.7 °C (98 °F) 84 18 (!) 157/100      Pulse Ox Temp  src Heart Rate Source Patient Position   (!) 93 % -- Monitor Lying      BP Location FiO2 (%)     Left arm --       Physical Exam    ED Course & MDM        Medical Decision Making      Procedure  Procedures     Trevor Evans PA-C  05/18/24 6307

## 2024-05-15 NOTE — PROGRESS NOTES
05/15/24 1500   Discharge Planning   Living Arrangements Alone  (friends stay with her from time to time)   Support Systems Family members;Friends/neighbors;Caodaism/alise community   Assistance Needed Per nephew, who is patient's POA, patient lives alone in a one-story home. Nephew states patient's friends will stay with her at times to assist her as needed. Patient receives dialysis at [facility] on [address] at 7 am. Nephew transports patient to dialysis, patient does not drive. Patient is A&O X3, on room air at baseline, is mostly independent with occasional assistance needed after dialysis with ADLs and uses a walker for ambulation.   Type of Residence Private residence   Number of Stairs to Enter Residence 5   Number of Stairs Within Residence 0   Do you have animals or pets at home? Yes   Type of Animals or Pets cat   Who is requesting discharge planning? Provider   Home or Post Acute Services In home services   Type of Home Care Services Home health aide;Home PT;Home nursing visits   Patient expects to be discharged to: TBD- PT/OT [facility] pending   Does the patient need discharge transport arranged? No

## 2024-05-16 ENCOUNTER — APPOINTMENT (OUTPATIENT)
Dept: DIALYSIS | Facility: HOSPITAL | Age: 69
End: 2024-05-16
Payer: MEDICARE

## 2024-05-16 ENCOUNTER — APPOINTMENT (OUTPATIENT)
Dept: CARDIOLOGY | Facility: HOSPITAL | Age: 69
End: 2024-05-16
Payer: MEDICARE

## 2024-05-16 LAB
ALBUMIN SERPL BCP-MCNC: 2.5 G/DL (ref 3.4–5)
ANION GAP SERPL CALC-SCNC: 11 MMOL/L (ref 10–20)
BUN SERPL-MCNC: 23 MG/DL (ref 6–23)
CALCIUM SERPL-MCNC: 7.8 MG/DL (ref 8.6–10.3)
CHLORIDE SERPL-SCNC: 101 MMOL/L (ref 98–107)
CO2 SERPL-SCNC: 32 MMOL/L (ref 21–32)
CREAT SERPL-MCNC: 2.19 MG/DL (ref 0.5–1.05)
EGFRCR SERPLBLD CKD-EPI 2021: 24 ML/MIN/1.73M*2
ERYTHROCYTE [DISTWIDTH] IN BLOOD BY AUTOMATED COUNT: 15.8 % (ref 11.5–14.5)
GLUCOSE BLD MANUAL STRIP-MCNC: 119 MG/DL (ref 74–99)
GLUCOSE BLD MANUAL STRIP-MCNC: 224 MG/DL (ref 74–99)
GLUCOSE BLD MANUAL STRIP-MCNC: 89 MG/DL (ref 74–99)
GLUCOSE SERPL-MCNC: 132 MG/DL (ref 74–99)
HCT VFR BLD AUTO: 33.7 % (ref 36–46)
HGB BLD-MCNC: 9.4 G/DL (ref 12–16)
MAGNESIUM SERPL-MCNC: 1.96 MG/DL (ref 1.6–2.4)
MCH RBC QN AUTO: 27.9 PG (ref 26–34)
MCHC RBC AUTO-ENTMCNC: 27.9 G/DL (ref 32–36)
MCV RBC AUTO: 100 FL (ref 80–100)
NRBC BLD-RTO: 0 /100 WBCS (ref 0–0)
PHOSPHATE SERPL-MCNC: 4.6 MG/DL (ref 2.5–4.9)
PLATELET # BLD AUTO: 313 X10*3/UL (ref 150–450)
POTASSIUM SERPL-SCNC: 4.6 MMOL/L (ref 3.5–5.3)
RBC # BLD AUTO: 3.37 X10*6/UL (ref 4–5.2)
SODIUM SERPL-SCNC: 139 MMOL/L (ref 136–145)
VANCOMYCIN SERPL-MCNC: 9.3 UG/ML (ref 5–20)
WBC # BLD AUTO: 8.9 X10*3/UL (ref 4.4–11.3)

## 2024-05-16 PROCEDURE — 8010000001 HC DIALYSIS - HEMODIALYSIS PER DAY

## 2024-05-16 PROCEDURE — 99232 SBSQ HOSP IP/OBS MODERATE 35: CPT

## 2024-05-16 PROCEDURE — 2500000004 HC RX 250 GENERAL PHARMACY W/ HCPCS (ALT 636 FOR OP/ED): Performed by: INTERNAL MEDICINE

## 2024-05-16 PROCEDURE — 85027 COMPLETE CBC AUTOMATED: CPT

## 2024-05-16 PROCEDURE — 93005 ELECTROCARDIOGRAM TRACING: CPT

## 2024-05-16 PROCEDURE — 2500000004 HC RX 250 GENERAL PHARMACY W/ HCPCS (ALT 636 FOR OP/ED)

## 2024-05-16 PROCEDURE — 2500000006 HC RX 250 W HCPCS SELF ADMINISTERED DRUGS (ALT 637 FOR ALL PAYERS)

## 2024-05-16 PROCEDURE — 1100000001 HC PRIVATE ROOM DAILY

## 2024-05-16 PROCEDURE — 2500000001 HC RX 250 WO HCPCS SELF ADMINISTERED DRUGS (ALT 637 FOR MEDICARE OP)

## 2024-05-16 PROCEDURE — 5A1D70Z PERFORMANCE OF URINARY FILTRATION, INTERMITTENT, LESS THAN 6 HOURS PER DAY: ICD-10-PCS | Performed by: INTERNAL MEDICINE

## 2024-05-16 PROCEDURE — 1090000002 HH PPS REVENUE DEBIT

## 2024-05-16 PROCEDURE — 1090000001 HH PPS REVENUE CREDIT

## 2024-05-16 PROCEDURE — 80069 RENAL FUNCTION PANEL: CPT

## 2024-05-16 PROCEDURE — 83735 ASSAY OF MAGNESIUM: CPT

## 2024-05-16 PROCEDURE — 82947 ASSAY GLUCOSE BLOOD QUANT: CPT | Mod: 91

## 2024-05-16 PROCEDURE — 80202 ASSAY OF VANCOMYCIN: CPT | Performed by: INTERNAL MEDICINE

## 2024-05-16 RX ORDER — HEPARIN SODIUM 1000 [USP'U]/ML
1900 INJECTION, SOLUTION INTRAVENOUS; SUBCUTANEOUS
Status: DISCONTINUED | OUTPATIENT
Start: 2024-05-16 | End: 2024-05-20 | Stop reason: HOSPADM

## 2024-05-16 RX ADMIN — HEPARIN SODIUM 1900 UNITS: 1000 INJECTION INTRAVENOUS; SUBCUTANEOUS at 13:46

## 2024-05-16 RX ADMIN — PANTOPRAZOLE SODIUM 40 MG: 40 TABLET, DELAYED RELEASE ORAL at 08:58

## 2024-05-16 RX ADMIN — ASPIRIN 81 MG: 81 TABLET, COATED ORAL at 08:58

## 2024-05-16 RX ADMIN — CLOPIDOGREL 75 MG: 75 TABLET ORAL at 08:59

## 2024-05-16 RX ADMIN — ATORVASTATIN CALCIUM 40 MG: 40 TABLET, FILM COATED ORAL at 08:58

## 2024-05-16 RX ADMIN — VENLAFAXINE HYDROCHLORIDE 150 MG: 150 CAPSULE, EXTENDED RELEASE ORAL at 08:59

## 2024-05-16 RX ADMIN — AMIODARONE HYDROCHLORIDE 200 MG: 200 TABLET ORAL at 08:59

## 2024-05-16 RX ADMIN — PANTOPRAZOLE SODIUM 40 MG: 40 TABLET, DELAYED RELEASE ORAL at 20:15

## 2024-05-16 RX ADMIN — HEPARIN SODIUM 1900 UNITS: 1000 INJECTION INTRAVENOUS; SUBCUTANEOUS at 13:45

## 2024-05-16 RX ADMIN — HEPARIN SODIUM 5000 UNITS: 5000 INJECTION INTRAVENOUS; SUBCUTANEOUS at 20:15

## 2024-05-16 RX ADMIN — HEPARIN SODIUM 5000 UNITS: 5000 INJECTION INTRAVENOUS; SUBCUTANEOUS at 04:04

## 2024-05-16 RX ADMIN — ACETAMINOPHEN 975 MG: 325 TABLET ORAL at 13:07

## 2024-05-16 RX ADMIN — METOPROLOL SUCCINATE 50 MG: 50 TABLET, EXTENDED RELEASE ORAL at 08:58

## 2024-05-16 RX ADMIN — LEVOTHYROXINE SODIUM 250 MCG: 100 TABLET ORAL at 04:04

## 2024-05-16 RX ADMIN — FERROUS GLUCONATE 324 MG: 324 TABLET ORAL at 08:59

## 2024-05-16 RX ADMIN — ACETAMINOPHEN 975 MG: 325 TABLET ORAL at 22:44

## 2024-05-16 RX ADMIN — VANCOMYCIN HYDROCHLORIDE 750 MG: 750 INJECTION, SOLUTION INTRAVENOUS at 16:12

## 2024-05-16 ASSESSMENT — COGNITIVE AND FUNCTIONAL STATUS - GENERAL
STANDING UP FROM CHAIR USING ARMS: A LITTLE
DRESSING REGULAR LOWER BODY CLOTHING: A LITTLE
CLIMB 3 TO 5 STEPS WITH RAILING: A LOT
DAILY ACTIVITIY SCORE: 20
TOILETING: A LITTLE
HELP NEEDED FOR BATHING: A LITTLE
CLIMB 3 TO 5 STEPS WITH RAILING: A LOT
MOVING FROM LYING ON BACK TO SITTING ON SIDE OF FLAT BED WITH BEDRAILS: A LITTLE
WALKING IN HOSPITAL ROOM: A LITTLE
TOILETING: A LITTLE
DRESSING REGULAR UPPER BODY CLOTHING: A LITTLE
MOBILITY SCORE: 17
MOVING TO AND FROM BED TO CHAIR: A LITTLE
TURNING FROM BACK TO SIDE WHILE IN FLAT BAD: A LITTLE
TURNING FROM BACK TO SIDE WHILE IN FLAT BAD: A LITTLE
DRESSING REGULAR LOWER BODY CLOTHING: A LITTLE
HELP NEEDED FOR BATHING: A LITTLE
DAILY ACTIVITIY SCORE: 20
MOBILITY SCORE: 17
MOVING FROM LYING ON BACK TO SITTING ON SIDE OF FLAT BED WITH BEDRAILS: A LITTLE
MOVING TO AND FROM BED TO CHAIR: A LITTLE
STANDING UP FROM CHAIR USING ARMS: A LITTLE
WALKING IN HOSPITAL ROOM: A LITTLE
DRESSING REGULAR UPPER BODY CLOTHING: A LITTLE

## 2024-05-16 ASSESSMENT — PAIN - FUNCTIONAL ASSESSMENT
PAIN_FUNCTIONAL_ASSESSMENT: 0-10

## 2024-05-16 ASSESSMENT — PAIN SCALES - GENERAL
PAINLEVEL_OUTOF10: 0 - NO PAIN
PAINLEVEL_OUTOF10: 3
PAINLEVEL_OUTOF10: 5 - MODERATE PAIN

## 2024-05-16 ASSESSMENT — PAIN DESCRIPTION - ORIENTATION: ORIENTATION: RIGHT;LEFT

## 2024-05-16 ASSESSMENT — PAIN DESCRIPTION - LOCATION
LOCATION: LEG
LOCATION: GENERALIZED

## 2024-05-16 NOTE — PROGRESS NOTES
"Vancomycin Dosing by Pharmacy- FOLLOW UP    Daphne Morris is a 69 y.o. year old female who Pharmacy has been consulted for vancomycin dosing for cellulitis, skin and soft tissue. Based on the patient's indication and renal status this patient is being dosed based on a goal AUC of 400-600.     HD is currently as scheduled T, R, Sa    Current vancomycin dose: 750 mg after each dialysis session.    Most recent random level: 9.3 mcg/mL    Visit Vitals  /79 (BP Location: Left arm, Patient Position: Lying)   Pulse 56   Temp 37.1 °C (98.7 °F) (Temporal)   Resp 16        Lab Results   Component Value Date    CREATININE 2.19 (H) 05/16/2024    CREATININE 1.83 (H) 05/15/2024    CREATININE 1.51 (H) 05/14/2024    CREATININE 3.60 (H) 05/01/2024        Patient weight is No results found for: \"PTWEIGHT\"    No results found for: \"CULTURE\"     I/O last 3 completed shifts:  In: - (0 mL/kg)   Out: 390 (5.2 mL/kg) [Urine:390 (0.1 mL/kg/hr)]  Weight: 74.8 kg   [unfilled]    Lab Results   Component Value Date    PATIENTTEMP  02/24/2024      Comment:      NOTE: Patient Results are Not Corrected for Temperature    PATIENTTEMP  02/22/2024      Comment:      NOTE: Patient Results are Not Corrected for Temperature    PATIENTTEMP  02/21/2024      Comment:      NOTE: Patient Results are Not Corrected for Temperature        Assessment/Plan    Within goal random/trough level; pt is ready for redose and for indication - 750mg after dialysis should be appropriate.  The next level will be obtained on 5/18 with AM labs to assess dose after dialysis. May be obtained sooner if clinically indicated.   Will continue to monitor HD schedule daily while on vancomycin.  Follow for continued vancomycin needs, clinical response, and signs/symptoms of toxicity.       Gamaliel Simpson, PharmD, BCPS  Clinical Pharmacy Specialist  Internal Medicine            "

## 2024-05-16 NOTE — PROGRESS NOTES
Daphne Morris is a 69 y.o. female on day 1 of admission presenting with Cellulitis.    Subjective   Interval History: no fever, no new complaints        Review of Systems    Objective   Range of Vitals (last 24 hours)  Heart Rate:  [64-89]   Temp:  [36.2 °C (97.2 °F)-36.4 °C (97.5 °F)]   Resp:  [16-20]   BP: (154-190)/()   Weight:  [74.8 kg (164 lb 14.5 oz)]   SpO2:  [97 %-98 %]   Daily Weight  05/16/24 : 74.8 kg (164 lb 14.5 oz)    Body mass index is 26.63 kg/m².    Physical Exam  Constitutional:       Appearance: Normal appearance.   HENT:      Head: Normocephalic and atraumatic.      Mouth/Throat:      Mouth: Mucous membranes are moist.      Pharynx: Oropharynx is clear.   Eyes:      Pupils: Pupils are equal, round, and reactive to light.   Cardiovascular:      Rate and Rhythm: Normal rate and regular rhythm.      Heart sounds: Normal heart sounds.   Pulmonary:      Effort: Pulmonary effort is normal.      Breath sounds: Normal breath sounds.   Abdominal:      General: Abdomen is flat. Bowel sounds are normal.      Palpations: Abdomen is soft.   Musculoskeletal:      Cervical back: Normal range of motion.      Comments: Legs wounds, less edema and redness   Neurological:      Mental Status: She is alert.         Antibiotics  metoprolol succinate (Toprol-XL) 24 hr tablet  heparin (porcine) injection 5,000 Units  polyethylene glycol (Glycolax, Miralax) packet 17 g  glucagon (Glucagen) injection 1 mg  dextrose 50 % injection 25 g  glucagon (Glucagen) injection 1 mg  dextrose 50 % injection 12.5 g  insulin lispro (HumaLOG) injection 0-10 Units  acetaminophen (Tylenol) tablet 975 mg  albuterol 90 mcg/actuation inhaler 2 puff  venlafaxine XR (Effexor-XR) 24 hr capsule 150 mg  pantoprazole (ProtoNix) EC tablet 40 mg  metoprolol succinate XL (Toprol-XL) 24 hr tablet 50 mg  levothyroxine (Synthroid, Levoxyl) tablet 250 mcg  ferrous gluconate (Fergon) 324 (38 Fe) mg tablet 324 mg  clopidogrel (Plavix) tablet 75  mg  atorvastatin (Lipitor) tablet 40 mg  aspirin EC tablet 81 mg  amiodarone (Pacerone) tablet 200 mg  vancomycin (Vancocin) pharmacy to dose - pharmacy monitoring  vancomycin in dextrose 5 % (Vancocin) IVPB 750 mg  alteplase (Cathflo Activase) injection 2 mg  sodium chloride 0.9% flush 10 mL  sodium chloride 0.9% flush 10 mL      Relevant Results  Labs  Results from last 72 hours   Lab Units 05/16/24  0553 05/15/24  0606 05/14/24  1518   WBC AUTO x10*3/uL 8.9 9.0 9.5   HEMOGLOBIN g/dL 9.4* 9.9* 9.8*   HEMATOCRIT % 33.7* 35.6* 33.9*   PLATELETS AUTO x10*3/uL 313 294 297   NEUTROS PCT AUTO %  --   --  67.4   LYMPHS PCT AUTO %  --   --  12.3   MONOS PCT AUTO %  --   --  10.2   EOS PCT AUTO %  --   --  8.9     Results from last 72 hours   Lab Units 05/16/24  0553 05/15/24  0606 05/14/24  1518   SODIUM mmol/L 139 139 139   POTASSIUM mmol/L 4.6 3.8 4.8   CHLORIDE mmol/L 101 102 101   CO2 mmol/L 32 33* 33*   BUN mg/dL 23 17 15   CREATININE mg/dL 2.19* 1.83* 1.51*   GLUCOSE mg/dL 132* 101* 166*   CALCIUM mg/dL 7.8* 7.7* 7.8*   ANION GAP mmol/L 11 8* 10   EGFR mL/min/1.73m*2 24* 29* 37*   PHOSPHORUS mg/dL 4.6 3.1  --      Results from last 72 hours   Lab Units 05/16/24  0553 05/15/24  0606 05/14/24  1518   ALK PHOS U/L  --   --  93   BILIRUBIN TOTAL mg/dL  --   --  0.4   PROTEIN TOTAL g/dL  --   --  6.1*   ALT U/L  --   --  7   AST U/L  --   --  20   ALBUMIN g/dL 2.5* 2.5* 2.7*     Estimated Creatinine Clearance: 24.7 mL/min (A) (by C-G formula based on SCr of 2.19 mg/dL (H)).  C-Reactive Protein   Date Value Ref Range Status   05/15/2024 5.41 (H) <1.00 mg/dL Final     CRP   Date Value Ref Range Status   05/06/2021 8.9 (H) 0 - 2.0 MG/DL Final     Comment:     Performed at [address] [address]   06/04/2020 2.3 (H) 0 - 2.0 MG/DL Final     Comment:     Performed at [address] [address]     Microbiology  Reviewed  Imaging  reviewed        Assessment/Plan     Legs none healing  wounds / infection, rule out calciphylaxis      Recommendations :  Continue Vancomycin  Discussed with the medical team     I spent minutes in the professional and overall care of this patient.      Myke Chavarria MD

## 2024-05-16 NOTE — CONSULTS
Wound Care Consult     Visit Date: 5/16/2024      Patient Name: Daphne Morris         MRN: [id number]           YOB: 1955     Reason for Consult:  Wounds bilateral shins, right wrist, left 5th finger, sacrum     Wound History:   All present on admission, patient reporting several falls at home where she lives alone in past week or so.     Pertinent Labs:   [place], Urine   Date Value Ref Range Status   07/15/2020 1,714 (H) 0 - 23 MG/L Final     Comment:     RECHECKED DILUTED  Performed at [place] [address]       Albumin   Date Value Ref Range Status   05/16/2024 2.5 (L) 3.4 - 5.0 g/dL Final     Albumin, Urine Random   Date Value Ref Range Status   02/01/2024 >2,250.0 Not established mg/L Final       Wound Assessment:  Wound 04/27/24 Skin Tear Wrist Right;Anterior (Active)   Wound Image   05/15/24 0201       Wound 04/27/24 Skin Tear Pretibial Left;Anterior (Active)   Wound Image   05/15/24 0201   Dressing Changed Changed 05/15/24 0221   Dressing Status Clean;Dry 05/15/24 2100       Wound 04/27/24 Skin Tear Arm Right;Upper;Ventral (Active)   Wound Image   05/15/24 0202       Wound 04/30/24 Pretibial Right (Active)   Wound Image   05/15/24 0158   Dressing Dry dressing 05/15/24 2100       Wound 05/08/24 Traumatic Tibial Dorsal;Left (Active)   Wound Image   05/15/24 0200   Dressing Changed Changed 05/15/24 0221   Dressing Status Clean;Dry 05/15/24 2100       Wound 05/15/24 Sacrum (Active)   Wound Image   05/15/24 0206       Wound Team Summary Assessment:   Anterior right shin - 5 x 6 cm full thickness trauma related wound with local erythema, tenderness, bed 100% moist tan slough, superior borders maroon/violet blotchy discoloration, blanchable - a concerning wound with goal of cleaning / stabilizing wound and moist healing environment - recommend podiatry evaluation.  Meanwhile dressed with Medihoney gel  Aquacel, 4x4, ABD and conform wrap after cleansed well and yue-wound  skin prepped with Cavilon. There is a similar wound to left shin, but this has no SOI and is only 20% tan and 80% viable pink base, it is 2.5 x 0.7 x 0.2 cm.  It was similarly dressed pending podiatry eval.  Assessment and recommendations d/w attending who expressed concern for possible pyoderma - she stated she would place podiatry consult.       Left 5th finger Cat. 1 skin tear with loose flap, pink bed, small s/s drainage and no SOI.  Cleansed well, epidermal flap repositioned over wound bed, dressed with Xeroform and 2x2 gauze with conform to secure after d/w attending provider.    Right wrist Cat. 3 skin tear -- 0.5 x 0.5 x 0.2 cm, clean pink bed.  Goal: moist healing using Xeroform and Mepilex.    Sacrum -- scattered small epidermal erosions with diffuse pink, blanchable skin - recommend use of Bordeaux Butt paste to promote healing and add protective layer.    Heels intact except remote report of a bruise from a prior fall to lateral left heel - completely dry, stable - location suspicious for remote DTPI?     Wound Team Plan:  Podiatry consult for BLE wounds.  Right wrist - cleanse NS, apply Xeroform and Mepilex, change every other day.  Left 5th finger - cleanse NS, apply Xeroform, wound sized  gauze, secure with conform stretch gauze, change daily.    Recommend follow up for all wounds at a [facility] - resides near [facility].      Lexi Lewis RN  5/16/2024  3:10 PM

## 2024-05-16 NOTE — PROGRESS NOTES
Report from Sending RN:    Report From: Anil Gunter LPN  Overseeing RN is Edwina Hampton on 1S  Anil S. to give report to 2S Navigator then pt can come up to dialysis.  Recent Surgery of Procedure: No  Baseline Level of Consciousness (LOC): A&Ox4  Oxygen Use: Yes  Type: 2L Chronic NC  Diabetic: Yes  Last BP Med Given Day of Dialysis: See MAR  Last Pain Med Given: See MAR  Lab Tests to be Obtained with Dialysis: No  Blood Transfusion to be Given During Dialysis: No  Available IV Access: Yes  Medications to be Administered During Dialysis: No  Continuous IV Infusion Running: No  Restraints on Currently or in the Last 24 Hours: No  Hand-Off Communication: DNR & No intubation; Okay to come up to the dialysis room.

## 2024-05-16 NOTE — CARE PLAN
The patient's goals for the shift include rest comfortably    The clinical goals for the shift include Patient will get a good night rest    Over the shift, the patient did not make progress toward the following goals. Barriers to progression include . Recommendations to address these barriers include   Problem: Pain - Adult  Goal: Verbalizes/displays adequate comfort level or baseline comfort level  5/15/2024 2246 by Geoffrey Wade RN  Outcome: Progressing  5/15/2024 2245 by Geoffrey Wade RN  Outcome: Progressing  Flowsheets (Taken 5/15/2024 2245)  Verbalizes/displays adequate comfort level or baseline comfort level: Encourage patient to monitor pain and request assistance   .

## 2024-05-16 NOTE — PROGRESS NOTES
"  Subjective    Overnight Events: NAOE    This morning, patient denies fevers, chills, sweats. States pain is well-controlled. Has no other complaints this morning. States that she is not on oxygen at home during the day.     ROS: 12 points review of system is negative except as stated in the HPI above.     Objective    Vitals  Visit Vitals  /79 (BP Location: Left arm, Patient Position: Lying)   Pulse 56   Temp 37.1 °C (98.7 °F) (Temporal)   Resp 16   Ht 1.676 m (5' 5.98\")   Wt 74.8 kg (164 lb 14.5 oz)   SpO2 97%   BMI 26.63 kg/m²   Smoking Status Former   BSA 1.87 m²       Physical Exam   Constitutional:       Appearance: Normal appearance.   HENT:      Head: Normocephalic and atraumatic.   Cardiovascular:      Rate and Rhythm: Normal rate and regular rhythm.   Pulmonary:      Effort: Pulmonary effort is normal.      Breath sounds: Normal breath sounds.   Skin:     General: Skin is warm and dry.      Findings: Erythema present.      Comments: Numerous ulcers at sites of trauma on the bilateral arms and lower legs. Both  ulcers of the lower legs have peripheral erythema and rolled boarders with necrotic tissue bed. Lesions on the arms are older and are healing in cribiform or wrinkled paper pattern, suggestive of prior ulceration. Bilateral lower legs and upper arms both appear erythematous, not warm to the touch and without swelling or purulent drainage.   Neurological:      Mental Status: She is alert.          IOs    Intake/Output Summary (Last 24 hours) at 5/16/2024 1330  Last data filed at 5/16/2024 1300  Gross per 24 hour   Intake 640 ml   Output 282 ml   Net 358 ml       Labs:   Results from last 72 hours   Lab Units 05/16/24  0553 05/15/24  0606 05/14/24  1518   SODIUM mmol/L 139 139 139   POTASSIUM mmol/L 4.6 3.8 4.8   CHLORIDE mmol/L 101 102 101   CO2 mmol/L 32 33* 33*   BUN mg/dL 23 17 15   CREATININE mg/dL 2.19* 1.83* 1.51*   GLUCOSE mg/dL 132* 101* 166*   CALCIUM mg/dL 7.8* 7.7* 7.8*   ANION GAP " "mmol/L 11 8* 10   EGFR mL/min/1.73m*2 24* 29* 37*   PHOSPHORUS mg/dL 4.6 3.1  --       Results from last 72 hours   Lab Units 05/16/24  0553 05/15/24  0606 05/14/24  1518   WBC AUTO x10*3/uL 8.9 9.0 9.5   HEMOGLOBIN g/dL 9.4* 9.9* 9.8*   HEMATOCRIT % 33.7* 35.6* 33.9*   PLATELETS AUTO x10*3/uL 313 294 297   NEUTROS PCT AUTO %  --   --  67.4   LYMPHS PCT AUTO %  --   --  12.3   MONOS PCT AUTO %  --   --  10.2   EOS PCT AUTO %  --   --  8.9      Lab Results   Component Value Date    CALCIUM 7.8 (L) 05/16/2024    PHOS 4.6 05/16/2024      Lab Results   Component Value Date    CRP 5.41 (H) 05/15/2024      [unfilled]     Micro/ID:   Susceptibility data from last 90 days.  Collected Specimen Info Organism Ampicillin Clindamycin Daptomycin Erythromycin Gentamicin Synergy Linezolid Oxacillin Penicillin Tetracycline Trimethoprim/Sulfamethoxazole Vancomycin   04/29/24 Tissue/Biopsy from Wound/Tissue Enterococcus faecium R  SDD  S S  R R R R     Methicillin Resistant Staphylococcus aureus (MRSA)  R  R   R  R S S   03/04/24 Swab from Anterior Nares Methicillin Resistant Staphylococcus aureus (MRSA)                               No lab exists for component: \"AGALPCRNB\"   .ID  Lab Results   Component Value Date    URINECULTURE No significant growth 04/29/2024    BLOODCULT No growth at 1 day 05/14/2024    BLOODCULT No growth at 1 day 05/14/2024       Images  CT head W O contrast trauma protocol, CT cervical spine wo IV contrast  Narrative: Interpreted By:  Washington Peraza,   STUDY:  CT HEAD W/O CONTRAST TRAUMA PROTOCOL; CT CERVICAL SPINE WO IV  CONTRAST;  5/14/2024 2:56 pm      INDICATION:  Signs/Symptoms:fall on thinners      COMPARISON:  04/06/2021      ACCESSION NUMBER(S):  [id number]; [id number]      ORDERING CLINICIAN:  MALKA RODGERS      TECHNIQUE:  Axial noncontrast CT images of head with coronal and sagittal  reconstructed images. Axial noncontrast CT images of the cervical  spine with coronal and sagittal " reconstructed images.      FINDINGS:  CT HEAD:      There is mild motion degradation. Global volume loss. No evidence of  acute hemorrhage. No mass effect. No midline shift.          CT CERVICAL SPINE:      Motion degraded examination. Evidence of acute cervical spine  fracture. Demineralization of the bones. Mild degenerative changes.      Impression: Assessment limited due to motion degradation      CT HEAD:  No acute intracranial abnormality or calvarial fracture.  Senescent changes      CT CERVICAL SPINE:  No acute fracture or traumatic malalignment of the cervical spine.  No findings of acute cervical spine fracture. Demineralization      Signed by: Washington Peraza 5/14/2024 3:11 PM  Dictation workstation:   [id number]  XR tibia fibula right 2 views  Narrative: Interpreted By:  Erlin Lui,   STUDY:  XR TIBIA FIBULA RIGHT 2 VIEWS;  5/14/2024 2:15 pm      INDICATION:  Signs/Symptoms:cellulitis around chronic wound on the right leg.      COMPARISON:  Previous exam from 04/29/2024      ACCESSION NUMBER(S):  [id number]      ORDERING CLINICIAN:  MALKA RODGERS      TECHNIQUE:  AP and lateral views  of the  right lower leg were obtained.      FINDINGS:  Remote distal fibular fracture with stable fixation hardware in  place. Persistent anterior mid right shin skin ulceration with mild  adjacent soft tissue swelling. No soft tissue gas density. No  significant osteophytic change. No lytic or blastic destructive bone  lesion. No acute fracture or dislocation.  No opaque soft tissue foreign body.  No periosteal reaction or erosion.      Impression: Stable appearance of fixated distal fibular fracture.      Persistent mid anterior right shin skin ulceration with surrounding  edema.      No associated soft tissue gas density or osseous lesion.      MACRO:  None      Signed by: Erlin Lui 5/14/2024 2:25 PM  Dictation workstation:   [id number]      Meds  Scheduled medications  amiodarone, 200 mg, oral, Daily with  breakfast  aspirin, 81 mg, oral, Daily  atorvastatin, 40 mg, oral, Daily  clopidogrel, 75 mg, oral, Daily  ferrous gluconate, 324 mg, oral, Daily with breakfast  heparin (porcine), 5,000 Units, subcutaneous, q8h  heparin, 1,900 Units, intra-catheter, After Dialysis  heparin, 1,900 Units, intra-catheter, After Dialysis  insulin lispro, 0-10 Units, subcutaneous, TID  levothyroxine, 250 mcg, oral, Daily before breakfast  metoprolol succinate XL, 50 mg, oral, Daily  pantoprazole, 40 mg, oral, BID  polyethylene glycol, 17 g, oral, Daily  vancomycin, 750 mg, intravenous, Once per day on Tuesday Thursday Saturday  venlafaxine XR, 150 mg, oral, Daily      Continuous medications     PRN medications  PRN medications: acetaminophen, albuterol, alteplase, dextrose, dextrose, glucagon, glucagon, sodium chloride 0.9%, sodium chloride 0.9%, vancomycin     Assessment and Plan    Principal Problem:    Cellulitis     Daphne Morris is a 69 y.o. female presenting with a PMHx of recurrent falls, cellulitis, CKD on dialysis, HTN, T2DM on insulin, anemia secondary to CKD, hypothyroidism, afib not on AC, and depression with anxiety who presents from [facility] for outpatient failure of abx for cellulitis.     #Cellulitis? Vs. Pyoderma Gangrenosum vs. Ecthyma Gangrenosum vs. Calciphylaxis vs. Other ulcerative inflammatory skin disease  ::Pt treated repeatedly with abx for suspected cellulitis in R lower extremity, however pt with additional ulceration on L leg and bilateral leg erythema. Pt notably without white count, systemic symptoms on admission, and all extremities with background of erythema. Inflammatory ulcers could be secondarily infected.  ::Numerous ulcers on both upper and lower extremities in various stages of healing without improvement with abx treatment are concerning for ulcerative or blistering disease in a CKD patient  ::Pyoderma gangrenosum is in the differential as ulcers are exquisitely painful with erythematous,  rolled boarders and necrotic tissue bed occurring at sites of prior trauma. One healed ulcer suggestive of characteristic cribriform scar (see prior media images)  -follow blood cultures  -wound care nurse consulted  -consider wound culture  -do not advise wound debridement, as it is an absolute contraindication in pyoderma gangrenosum  -consider outpatient dermatology follow up or inpatient evaluation, pt will require skin biopsy of ulcer  -continue with IV Vanc for possible MRSA Cellulitis  -follow CBC  -ESR and CRP ordered->elevated   -pain control with tylenol PRN  -Consulted ID, appreciate recs; continue vancomycin      Chronic Medical Issues:   #CKD  -dialysis T, R, Saturday  -daily RFP  -Consulted Nephrology ; HD 5/16     #HTN #HLD  -continue aspirin, clopidogrel  -Continue metoprolol     #CHF  -managed with dialysis, not on diuretics     #paroxysmal afib  -continue home amiodarone  -not on anticoagulation     #T2DM on insulin  -SSI #2  -holding home lantus (10 units nightly) in setting of possible infection, may require reduced dose     #Anemia  -secondary to CKD, stable  -continue home ferrous gluconate     #Hypothyroidism  -continue home levothyroxine     #Depression/Anxiety  -continue home venlafaxine     #GERD  -continue home pantoprazole     #SUZE  -Patient not on O2 during the day at home, no SOB  -Currently on 2 L NC   -Wean O2 as tolerated     F: prn  E: prn  N: diabetic diet  DVT ppx: heparin subcutaneous  GI ppx: home pantoprazole  Code status DNR/DNI      Disclaimer: Documentation completed with the information available at the time of input. The times in the chart may not be reflective of actual patient care times, interventions, or procedures. Documentation occurs after the physical care of the patient.     Carolyn Hunter MD  PGY-1  Transitional Year Resident

## 2024-05-16 NOTE — PROGRESS NOTES
Report to Receiving RN:    Report To: Anil Gunter LPN  Overseeing RN on 1S is Edwina Hampton RN  2S Navigator is Nina Guzman RN  Time Report Called: 1402  Hand-Off Communication: 2L removed with dialysis; Dressing changed pre HD; No issues with dialysis; Ending BP is 136/67 p59  Complications During Treatment: No  Ultrafiltration Treatment: N/A, Regular HD TX with fluid removal  Medications Administered During Dialysis: Yes  Blood Products Administered During Dialysis: No  Labs Sent During Dialysis: No  Heparin Drip Rate Changes: N/A  Dialysis Catheter Dressing: D&I  Last Dressing Change: 05/16/2024(KS)    Electronic Signatures:  Shani Jj OCDT   Last Updated: 1:59 PM by SHANI JJ

## 2024-05-16 NOTE — PROGRESS NOTES
ESRD on HD stable  HTNsive heart and kidney disease    - Seen on HD. NO issues with tx. Tx orders reviewed and d/w HD staff  - Complete HD as ordered

## 2024-05-17 ENCOUNTER — APPOINTMENT (OUTPATIENT)
Dept: CARDIOLOGY | Facility: HOSPITAL | Age: 69
DRG: 602 | End: 2024-05-17
Payer: MEDICARE

## 2024-05-17 ENCOUNTER — APPOINTMENT (OUTPATIENT)
Dept: RADIOLOGY | Facility: HOSPITAL | Age: 69
DRG: 602 | End: 2024-05-17
Payer: MEDICARE

## 2024-05-17 ENCOUNTER — APPOINTMENT (OUTPATIENT)
Dept: VASCULAR MEDICINE | Facility: HOSPITAL | Age: 69
DRG: 602 | End: 2024-05-17
Payer: MEDICARE

## 2024-05-17 LAB
ALBUMIN SERPL BCP-MCNC: 2.6 G/DL (ref 3.4–5)
ANION GAP BLDV CALCULATED.4IONS-SCNC: 7 MMOL/L (ref 10–25)
ANION GAP BLDV CALCULATED.4IONS-SCNC: 7 MMOL/L (ref 10–25)
ANION GAP SERPL CALC-SCNC: 7 MMOL/L (ref 10–20)
APTT PPP: 70 SECONDS (ref 27–38)
BASE EXCESS BLDV CALC-SCNC: 1 MMOL/L (ref -2–3)
BASE EXCESS BLDV CALC-SCNC: 3.3 MMOL/L (ref -2–3)
BODY TEMPERATURE: ABNORMAL
BODY TEMPERATURE: ABNORMAL
BUN SERPL-MCNC: 17 MG/DL (ref 6–23)
CA-I BLDV-SCNC: 1.11 MMOL/L (ref 1.1–1.33)
CA-I BLDV-SCNC: 1.14 MMOL/L (ref 1.1–1.33)
CALCIUM SERPL-MCNC: 7.8 MG/DL (ref 8.6–10.3)
CHLORIDE BLDV-SCNC: 101 MMOL/L (ref 98–107)
CHLORIDE BLDV-SCNC: 95 MMOL/L (ref 98–107)
CHLORIDE SERPL-SCNC: 98 MMOL/L (ref 98–107)
CO2 SERPL-SCNC: 33 MMOL/L (ref 21–32)
CREAT SERPL-MCNC: 2.14 MG/DL (ref 0.5–1.05)
D DIMER PPP FEU-MCNC: 3559 NG/ML FEU
EGFRCR SERPLBLD CKD-EPI 2021: 24 ML/MIN/1.73M*2
ERYTHROCYTE [DISTWIDTH] IN BLOOD BY AUTOMATED COUNT: 15.9 % (ref 11.5–14.5)
GLUCOSE BLD MANUAL STRIP-MCNC: 126 MG/DL (ref 74–99)
GLUCOSE BLD MANUAL STRIP-MCNC: 142 MG/DL (ref 74–99)
GLUCOSE BLD MANUAL STRIP-MCNC: 162 MG/DL (ref 74–99)
GLUCOSE BLD MANUAL STRIP-MCNC: 236 MG/DL (ref 74–99)
GLUCOSE BLDV-MCNC: 197 MG/DL (ref 74–99)
GLUCOSE BLDV-MCNC: 209 MG/DL (ref 74–99)
GLUCOSE SERPL-MCNC: 152 MG/DL (ref 74–99)
HCO3 BLDV-SCNC: 29 MMOL/L (ref 22–26)
HCO3 BLDV-SCNC: 30.7 MMOL/L (ref 22–26)
HCT VFR BLD AUTO: 34.2 % (ref 36–46)
HCT VFR BLD EST: 29 % (ref 36–46)
HCT VFR BLD EST: 48 % (ref 36–46)
HGB BLD-MCNC: 9.5 G/DL (ref 12–16)
HGB BLDV-MCNC: 16 G/DL (ref 12–16)
HGB BLDV-MCNC: 9.5 G/DL (ref 12–16)
INHALED O2 CONCENTRATION: 28 %
INHALED O2 CONCENTRATION: 32 %
INR PPP: 1 (ref 0.9–1.1)
LACTATE BLDV-SCNC: 1.1 MMOL/L (ref 0.4–2)
LACTATE BLDV-SCNC: 1.4 MMOL/L (ref 0.4–2)
MAGNESIUM SERPL-MCNC: 1.82 MG/DL (ref 1.6–2.4)
MCH RBC QN AUTO: 27.8 PG (ref 26–34)
MCHC RBC AUTO-ENTMCNC: 27.8 G/DL (ref 32–36)
MCV RBC AUTO: 100 FL (ref 80–100)
NRBC BLD-RTO: 0.2 /100 WBCS (ref 0–0)
OXYHGB MFR BLDV: 67.4 % (ref 45–75)
OXYHGB MFR BLDV: 88.9 % (ref 45–75)
PCO2 BLDV: 49 MM HG (ref 41–51)
PCO2 BLDV: 70 MM HG (ref 41–51)
PH BLDV: 7.25 PH (ref 7.33–7.43)
PH BLDV: 7.38 PH (ref 7.33–7.43)
PHOSPHATE SERPL-MCNC: 3.8 MG/DL (ref 2.5–4.9)
PLATELET # BLD AUTO: 300 X10*3/UL (ref 150–450)
PO2 BLDV: 43 MM HG (ref 35–45)
PO2 BLDV: 56 MM HG (ref 35–45)
POTASSIUM BLDV-SCNC: 4.1 MMOL/L (ref 3.5–5.3)
POTASSIUM BLDV-SCNC: 4.6 MMOL/L (ref 3.5–5.3)
POTASSIUM SERPL-SCNC: 4.1 MMOL/L (ref 3.5–5.3)
PROTHROMBIN TIME: 11 SECONDS (ref 9.8–12.8)
RBC # BLD AUTO: 3.42 X10*6/UL (ref 4–5.2)
SAO2 % BLDV: 69 % (ref 45–75)
SAO2 % BLDV: 91 % (ref 45–75)
SODIUM BLDV-SCNC: 129 MMOL/L (ref 136–145)
SODIUM BLDV-SCNC: 132 MMOL/L (ref 136–145)
SODIUM SERPL-SCNC: 134 MMOL/L (ref 136–145)
STAPHYLOCOCCUS SPEC CULT: ABNORMAL
UFH PPP CHRO-ACNC: 0.3 IU/ML
UFH PPP CHRO-ACNC: 0.6 IU/ML
WBC # BLD AUTO: 8.9 X10*3/UL (ref 4.4–11.3)

## 2024-05-17 PROCEDURE — 2500000002 HC RX 250 W HCPCS SELF ADMINISTERED DRUGS (ALT 637 FOR MEDICARE OP, ALT 636 FOR OP/ED): Mod: MUE | Performed by: INTERNAL MEDICINE

## 2024-05-17 PROCEDURE — 3430000001 HC RX 343 DIAGNOSTIC RADIOPHARMACEUTICALS

## 2024-05-17 PROCEDURE — 83735 ASSAY OF MAGNESIUM: CPT

## 2024-05-17 PROCEDURE — 94760 N-INVAS EAR/PLS OXIMETRY 1: CPT

## 2024-05-17 PROCEDURE — 93005 ELECTROCARDIOGRAM TRACING: CPT

## 2024-05-17 PROCEDURE — 94664 DEMO&/EVAL PT USE INHALER: CPT

## 2024-05-17 PROCEDURE — 82947 ASSAY GLUCOSE BLOOD QUANT: CPT | Mod: 91,MUE

## 2024-05-17 PROCEDURE — 85379 FIBRIN DEGRADATION QUANT: CPT

## 2024-05-17 PROCEDURE — 94640 AIRWAY INHALATION TREATMENT: CPT

## 2024-05-17 PROCEDURE — 85027 COMPLETE CBC AUTOMATED: CPT

## 2024-05-17 PROCEDURE — 78830 RP LOCLZJ TUM SPECT W/CT 1: CPT | Performed by: NUCLEAR MEDICINE

## 2024-05-17 PROCEDURE — 85520 HEPARIN ASSAY: CPT

## 2024-05-17 PROCEDURE — 93010 ELECTROCARDIOGRAM REPORT: CPT | Performed by: INTERNAL MEDICINE

## 2024-05-17 PROCEDURE — 1090000002 HH PPS REVENUE DEBIT

## 2024-05-17 PROCEDURE — 93970 EXTREMITY STUDY: CPT | Performed by: INTERNAL MEDICINE

## 2024-05-17 PROCEDURE — 84132 ASSAY OF SERUM POTASSIUM: CPT | Mod: 91

## 2024-05-17 PROCEDURE — 85610 PROTHROMBIN TIME: CPT

## 2024-05-17 PROCEDURE — 2500000004 HC RX 250 GENERAL PHARMACY W/ HCPCS (ALT 636 FOR OP/ED)

## 2024-05-17 PROCEDURE — 2500000005 HC RX 250 GENERAL PHARMACY W/O HCPCS: Performed by: INTERNAL MEDICINE

## 2024-05-17 PROCEDURE — 2500000002 HC RX 250 W HCPCS SELF ADMINISTERED DRUGS (ALT 637 FOR MEDICARE OP, ALT 636 FOR OP/ED)

## 2024-05-17 PROCEDURE — 78830 RP LOCLZJ TUM SPECT W/CT 1: CPT

## 2024-05-17 PROCEDURE — 99232 SBSQ HOSP IP/OBS MODERATE 35: CPT

## 2024-05-17 PROCEDURE — 85730 THROMBOPLASTIN TIME PARTIAL: CPT

## 2024-05-17 PROCEDURE — 1090000001 HH PPS REVENUE CREDIT

## 2024-05-17 PROCEDURE — 1100000001 HC PRIVATE ROOM DAILY

## 2024-05-17 PROCEDURE — 93970 EXTREMITY STUDY: CPT

## 2024-05-17 PROCEDURE — 2500000006 HC RX 250 W HCPCS SELF ADMINISTERED DRUGS (ALT 637 FOR ALL PAYERS)

## 2024-05-17 PROCEDURE — 2500000001 HC RX 250 WO HCPCS SELF ADMINISTERED DRUGS (ALT 637 FOR MEDICARE OP)

## 2024-05-17 PROCEDURE — A9540 TC99M MAA: HCPCS

## 2024-05-17 RX ORDER — ALBUTEROL SULFATE 0.83 MG/ML
2.5 SOLUTION RESPIRATORY (INHALATION) EVERY 4 HOURS PRN
Status: DISCONTINUED | OUTPATIENT
Start: 2024-05-17 | End: 2024-05-17

## 2024-05-17 RX ORDER — ALBUTEROL SULFATE 0.83 MG/ML
2.5 SOLUTION RESPIRATORY (INHALATION) EVERY 2 HOUR PRN
Status: DISCONTINUED | OUTPATIENT
Start: 2024-05-17 | End: 2024-05-20 | Stop reason: HOSPADM

## 2024-05-17 RX ORDER — HEPARIN SODIUM 10000 [USP'U]/100ML
0-4500 INJECTION, SOLUTION INTRAVENOUS CONTINUOUS
Status: DISCONTINUED | OUTPATIENT
Start: 2024-05-17 | End: 2024-05-18

## 2024-05-17 RX ORDER — IPRATROPIUM BROMIDE AND ALBUTEROL SULFATE 2.5; .5 MG/3ML; MG/3ML
3 SOLUTION RESPIRATORY (INHALATION)
Status: DISCONTINUED | OUTPATIENT
Start: 2024-05-17 | End: 2024-05-20 | Stop reason: HOSPADM

## 2024-05-17 RX ADMIN — HEPARIN SODIUM 1300 UNITS/HR: 10000 INJECTION, SOLUTION INTRAVENOUS at 03:04

## 2024-05-17 RX ADMIN — FERROUS GLUCONATE 324 MG: 324 TABLET ORAL at 08:15

## 2024-05-17 RX ADMIN — IPRATROPIUM BROMIDE AND ALBUTEROL SULFATE 3 ML: 2.5; .5 SOLUTION RESPIRATORY (INHALATION) at 20:25

## 2024-05-17 RX ADMIN — PANTOPRAZOLE SODIUM 40 MG: 40 TABLET, DELAYED RELEASE ORAL at 08:16

## 2024-05-17 RX ADMIN — METOPROLOL SUCCINATE 50 MG: 50 TABLET, EXTENDED RELEASE ORAL at 08:16

## 2024-05-17 RX ADMIN — CLOPIDOGREL 75 MG: 75 TABLET ORAL at 08:15

## 2024-05-17 RX ADMIN — HEPARIN SODIUM 13 UNITS/HR: 10000 INJECTION, SOLUTION INTRAVENOUS at 20:00

## 2024-05-17 RX ADMIN — AMIODARONE HYDROCHLORIDE 200 MG: 200 TABLET ORAL at 08:15

## 2024-05-17 RX ADMIN — ALBUTEROL SULFATE 2.5 MG: 2.5 SOLUTION RESPIRATORY (INHALATION) at 01:23

## 2024-05-17 RX ADMIN — ASPIRIN 81 MG: 81 TABLET, COATED ORAL at 08:15

## 2024-05-17 RX ADMIN — ATORVASTATIN CALCIUM 40 MG: 40 TABLET, FILM COATED ORAL at 20:01

## 2024-05-17 RX ADMIN — PANTOPRAZOLE SODIUM 40 MG: 40 TABLET, DELAYED RELEASE ORAL at 20:01

## 2024-05-17 RX ADMIN — IPRATROPIUM BROMIDE AND ALBUTEROL SULFATE 3 ML: 2.5; .5 SOLUTION RESPIRATORY (INHALATION) at 14:23

## 2024-05-17 RX ADMIN — VENLAFAXINE HYDROCHLORIDE 150 MG: 150 CAPSULE, EXTENDED RELEASE ORAL at 08:16

## 2024-05-17 RX ADMIN — INSULIN LISPRO 2 UNITS: 100 INJECTION, SOLUTION INTRAVENOUS; SUBCUTANEOUS at 12:51

## 2024-05-17 RX ADMIN — ACETAMINOPHEN 975 MG: 325 TABLET ORAL at 18:54

## 2024-05-17 RX ADMIN — Medication 4 L/MIN: at 07:16

## 2024-05-17 RX ADMIN — Medication 4 L/MIN: at 01:23

## 2024-05-17 RX ADMIN — LEVOTHYROXINE SODIUM 250 MCG: 100 TABLET ORAL at 06:35

## 2024-05-17 RX ADMIN — KIT FOR THE PREPARATION OF TECHNETIUM TC 99M ALBUMIN AGGREGATED 4.2 MILLICURIE: 2.5 INJECTION, POWDER, FOR SOLUTION INTRAVENOUS at 07:30

## 2024-05-17 ASSESSMENT — PAIN - FUNCTIONAL ASSESSMENT
PAIN_FUNCTIONAL_ASSESSMENT: 0-10

## 2024-05-17 ASSESSMENT — COGNITIVE AND FUNCTIONAL STATUS - GENERAL
MOVING TO AND FROM BED TO CHAIR: A LITTLE
TURNING FROM BACK TO SIDE WHILE IN FLAT BAD: A LITTLE
CLIMB 3 TO 5 STEPS WITH RAILING: A LOT
DRESSING REGULAR LOWER BODY CLOTHING: A LITTLE
STANDING UP FROM CHAIR USING ARMS: A LITTLE
CLIMB 3 TO 5 STEPS WITH RAILING: A LOT
DAILY ACTIVITIY SCORE: 20
TOILETING: A LITTLE
MOVING FROM LYING ON BACK TO SITTING ON SIDE OF FLAT BED WITH BEDRAILS: A LITTLE
WALKING IN HOSPITAL ROOM: A LITTLE
MOVING FROM LYING ON BACK TO SITTING ON SIDE OF FLAT BED WITH BEDRAILS: A LITTLE
WALKING IN HOSPITAL ROOM: A LITTLE
DRESSING REGULAR LOWER BODY CLOTHING: A LITTLE
MOBILITY SCORE: 17
STANDING UP FROM CHAIR USING ARMS: A LITTLE
DRESSING REGULAR UPPER BODY CLOTHING: A LITTLE
DRESSING REGULAR UPPER BODY CLOTHING: A LITTLE
MOBILITY SCORE: 17
TOILETING: A LITTLE
HELP NEEDED FOR BATHING: A LITTLE
TURNING FROM BACK TO SIDE WHILE IN FLAT BAD: A LITTLE
DAILY ACTIVITIY SCORE: 20
MOVING TO AND FROM BED TO CHAIR: A LITTLE
HELP NEEDED FOR BATHING: A LITTLE

## 2024-05-17 ASSESSMENT — PAIN SCALES - GENERAL
PAINLEVEL_OUTOF10: 0 - NO PAIN
PAINLEVEL_OUTOF10: 3

## 2024-05-17 ASSESSMENT — PAIN DESCRIPTION - LOCATION: LOCATION: HEAD

## 2024-05-17 NOTE — PROGRESS NOTES
"  Subjective    Overnight Events: Overnight, patient had increased work of breathing around 3 am. Patient was given albuterol, EKG and D-Dimer were obtained, and patient was put on 6L O2 with improvement. D-dimer was over 3,000. Heparin drip was started, as was V/Q scan which showed intermediate risk of PE. VBG showed pH 7.25, CO2 70, Bicarb 30.      This morning, patient denies SOB. Currently on 4 L NC. States that she is normally on only 3L at night and RA during the day.     ROS: 12 points review of system is negative except as stated in the HPI above.    Objective    Vitals  Visit Vitals  /70 (BP Location: Left arm, Patient Position: Sitting)   Pulse 61   Temp 36.8 °C (98.3 °F) (Temporal)   Resp 18   Ht 1.676 m (5' 5.98\")   Wt 74.8 kg (164 lb 14.5 oz)   SpO2 99%   BMI 26.63 kg/m²   Smoking Status Former   BSA 1.87 m²       Physical Exam   Constitutional:       Appearance: Normal appearance.   HENT:      Head: Normocephalic and atraumatic.   Cardiovascular:      Rate and Rhythm: Normal rate and regular rhythm.   Pulmonary:      Effort: Pulmonary effort is normal.      Breath sounds: Normal breath sounds.   Skin:     General: Skin is warm and dry.      Findings: Erythema present.      Comments: Numerous ulcers at sites of trauma on the bilateral arms and lower legs. Both  ulcers of the lower legs have peripheral erythema and rolled boarders with necrotic tissue bed. Lesions on the arms are older and are healing in cribiform or wrinkled paper pattern, suggestive of prior ulceration. Bilateral lower legs and upper arms both appear erythematous, not warm to the touch and without swelling or purulent drainage.   Neurological:      Mental Status: She is alert.          IOs    Intake/Output Summary (Last 24 hours) at 5/17/2024 1214  Last data filed at 5/17/2024 0905  Gross per 24 hour   Intake 1080 ml   Output 4401 ml   Net -3321 ml       Labs:   Results from last 72 hours   Lab Units 05/17/24  0840 05/16/24  0553 " "05/15/24  0606   SODIUM mmol/L 134* 139 139   POTASSIUM mmol/L 4.1 4.6 3.8   CHLORIDE mmol/L 98 101 102   CO2 mmol/L 33* 32 33*   BUN mg/dL 17 23 17   CREATININE mg/dL 2.14* 2.19* 1.83*   GLUCOSE mg/dL 152* 132* 101*   CALCIUM mg/dL 7.8* 7.8* 7.7*   ANION GAP mmol/L 7* 11 8*   EGFR mL/min/1.73m*2 24* 24* 29*   PHOSPHORUS mg/dL 3.8 4.6 3.1      Results from last 72 hours   Lab Units 05/17/24  0840 05/16/24  0553 05/15/24  0606 05/14/24  1518   WBC AUTO x10*3/uL 8.9 8.9 9.0 9.5   HEMOGLOBIN g/dL 9.5* 9.4* 9.9* 9.8*   HEMATOCRIT % 34.2* 33.7* 35.6* 33.9*   PLATELETS AUTO x10*3/uL 300 313 294 297   NEUTROS PCT AUTO %  --   --   --  67.4   LYMPHS PCT AUTO %  --   --   --  12.3   MONOS PCT AUTO %  --   --   --  10.2   EOS PCT AUTO %  --   --   --  8.9      Lab Results   Component Value Date    CALCIUM 7.8 (L) 05/17/2024    PHOS 3.8 05/17/2024      Lab Results   Component Value Date    CRP 5.41 (H) 05/15/2024      [unfilled]     Micro/ID:   Susceptibility data from last 90 days.  Collected Specimen Info Organism Ampicillin Clindamycin Daptomycin Erythromycin Gentamicin Synergy Linezolid Oxacillin Penicillin Tetracycline Trimethoprim/Sulfamethoxazole Vancomycin   05/15/24 Swab from Anterior Nares Methicillin Resistant Staphylococcus aureus (MRSA)              04/29/24 Tissue/Biopsy from Wound/Tissue Enterococcus faecium R  SDD  S S  R R R R     Methicillin Resistant Staphylococcus aureus (MRSA)  R  R   R  R S S   03/04/24 Swab from Anterior Nares Methicillin Resistant Staphylococcus aureus (MRSA)                               No lab exists for component: \"AGALPCRNB\"   .ID  Lab Results   Component Value Date    URINECULTURE No significant growth 04/29/2024    BLOODCULT No growth at 2 days 05/14/2024    BLOODCULT No growth at 2 days 05/14/2024       Images  NM Lung perfusion with spect/ct  Narrative: Interpreted By:  Rolando Dillon,  Jill Douglas   STUDY:  NM LUNG PERFUSION WITH SPECT/CT;  5/17/2024 " 8:04 am      INDICATION:  Signs/Symptoms:elevated D dimer increased oxygen demand.      COMPARISON:  No recent chest imaging available for comparison.      ACCESSION NUMBER(S):  [id number]      ORDERING CLINICIAN:  DULCE CHENEY      TECHNIQUE:  DIVISION OF NUCLEAR MEDICINE  PERFUSION LUNG SCAN      Multiple perfusion images of the lungs were obtained after the  intravenous administration of 4.2 mCi of Tc-99m MAA. SPECT/CT images  of the lungs were also obtained      FINDINGS:  There is a wedge-shaped perfusion defect within the left upper lobe  without corresponding anatomic abnormality. There is nonsegmental  perfusion defect throughout the right lower lobe secondary to large  pleural effusion and near-complete collapse of the right lower lobe.  Nonsegmental perfusion defect within the superior aspect of the left  lower lobe.      Anatomic imaging demonstrates large right-sided pleural effusion with  near-complete collapse of the right lower lobe and partial collapse  of the right upper lobe.      Impression: Intermediate probability of acute pulmonary embolism as demonstrated  by a segmental defect in the left upper lobe and nonsegmental defects  throughout the left lower and right lower lobes.      I personally reviewed the images/study and I agree with the findings  as stated.  This study was interpreted at [facility], [place], [place].      MACRO:  Critical Finding:  See findings. Notification was initiated on  5/17/2024 at 9:10 am by  Rolando Dillon.  (**-YCF-**) Instructions:          Signed by: Rolando Dillon 5/17/2024 9:11 AM  Dictation workstation:   [id number]  Electrocardiogram, 12-lead PRN ACS symptoms  Sinus bradycardia  Left axis deviation  Left bundle branch block  Abnormal ECG  When compared with ECG of 14-MAY-2024 14:21, (unconfirmed)  Vent. rate has decreased BY  29 BPM      Meds  Scheduled medications  amiodarone, 200 mg, oral, Daily with breakfast  aspirin, 81  mg, oral, Daily  atorvastatin, 40 mg, oral, Daily  clopidogrel, 75 mg, oral, Daily  ferrous gluconate, 324 mg, oral, Daily with breakfast  heparin, 1,900 Units, intra-catheter, After Dialysis  heparin, 1,900 Units, intra-catheter, After Dialysis  insulin lispro, 0-10 Units, subcutaneous, TID  ipratropium-albuteroL, 3 mL, nebulization, q6h while awake  levothyroxine, 250 mcg, oral, Daily before breakfast  metoprolol succinate XL, 50 mg, oral, Daily  pantoprazole, 40 mg, oral, BID  polyethylene glycol, 17 g, oral, Daily  vancomycin, 750 mg, intravenous, Once per day on Tuesday Thursday Saturday  venlafaxine XR, 150 mg, oral, Daily      Continuous medications  heparin, 0-4,500 Units/hr, Last Rate: 1,300 Units/hr (05/17/24 0304)      PRN medications  PRN medications: acetaminophen, albuterol, albuterol, alteplase, dextrose, dextrose, glucagon, glucagon, heparin, oxygen, sodium chloride 0.9%, sodium chloride 0.9%, vancomycin     Assessment and Plan    Principal Problem:    Cellulitis     Daphne Morris is a 69 y.o. female presenting with a PMHx of recurrent falls, cellulitis, CKD on dialysis, HTN, T2DM on insulin, anemia secondary to CKD, hypothyroidism, afib not on AC, and depression with anxiety who presents [facility] for outpatient failure of abx for cellulitis.     #Cellulitis? Vs. Pyoderma Gangrenosum vs. Ecthyma Gangrenosum vs. Calciphylaxis vs. Other ulcerative inflammatory skin disease  ::Pt treated repeatedly with abx for suspected cellulitis in R lower extremity, however pt with additional ulceration on L leg and bilateral leg erythema. Pt notably without white count, systemic symptoms on admission, and all extremities with background of erythema. Inflammatory ulcers could be secondarily infected.  ::Numerous ulcers on both upper and lower extremities in various stages of healing without improvement with abx treatment are concerning for ulcerative or blistering disease in a CKD patient  ::Pyoderma  gangrenosum is in the differential as ulcers are exquisitely painful with erythematous, rolled boarders and necrotic tissue bed occurring at sites of prior trauma. One healed ulcer suggestive of characteristic cribriform scar (see prior media images)  -follow blood cultures  -wound care nurse consulted  -consider wound culture  -do not advise wound debridement, as it is an absolute contraindication in pyoderma gangrenosum  -consider outpatient dermatology follow up or inpatient evaluation, pt will require skin biopsy of ulcer  -continue with IV Vanc for possible MRSA Cellulitis  -follow CBC  -ESR and CRP ordered->elevated   -pain control with tylenol PRN  -Consulted ID, appreciate recs; continue vancomycin     #Respiratory Distress  #SUZE  #Concern for PE  -Patient not on O2 during the day at home, is on PRN 3L at night at home; no SOB currently  -Patient had increased work of breathing overnight on 5/17, as noted in HPI   -Patient was given albuterol, EKG and D-Dimer were obtained, and patient was put on 6L O2 with improvement. D-dimer was over 3,000. Heparin drip was started, as was V/Q scan which showed intermediate risk of PE. VBG showed pH 7.25, CO2 70, Bicarb 30.    -Patient currently on 4L NC O2 during the day; attempt to wean as tolerated  -Obtain CT PE after discussion with Nephrology due to patient's scheduled HD   -Ordered US of lower extremities  -Ordered repeat VBG, can consider Bipap based on results      Chronic Medical Issues:   #CKD  -dialysis T, R, Saturday  -daily RFP  -Consulted Nephrology ; HD 5/16     #HTN #HLD  -continue aspirin, clopidogrel  -Continue metoprolol     #CHF  -managed with dialysis, not on diuretics     #paroxysmal afib  -continue home amiodarone  -not on anticoagulation     #T2DM on insulin  -SSI #2  -holding home lantus (10 units nightly) in setting of possible infection, may require reduced dose     #Anemia  -secondary to CKD, stable  -continue home ferrous gluconate      #Hypothyroidism  -continue home levothyroxine     #Depression/Anxiety  -continue home venlafaxine     #GERD  -continue home pantoprazole       F: prn  E: prn  N: diabetic diet  DVT ppx: heparin subcutaneous  GI ppx: home pantoprazole  Code status DNR/DNI    Disclaimer: Documentation completed with the information available at the time of input. The times in the chart may not be reflective of actual patient care times, interventions, or procedures. Documentation occurs after the physical care of the patient.     Carolyn Hunter MD  PGY-1  Transitional Year Resident

## 2024-05-17 NOTE — PROGRESS NOTES
Daphne Morris is a 69 y.o. female on day 2 of admission presenting with Cellulitis.    Subjective   Interval History: no fever, no new complaints        Review of Systems    Objective   Range of Vitals (last 24 hours)  Heart Rate:  [56-83]   Temp:  [36.2 °C (97.2 °F)-37.1 °C (98.7 °F)]   Resp:  [16-22]   BP: (157-174)/(69-81)   SpO2:  [94 %-100 %]   Daily Weight  05/16/24 : 74.8 kg (164 lb 14.5 oz)    Body mass index is 26.63 kg/m².    Physical Exam  Constitutional:       Appearance: Normal appearance.   HENT:      Head: Normocephalic and atraumatic.      Mouth/Throat:      Mouth: Mucous membranes are moist.      Pharynx: Oropharynx is clear.   Eyes:      Pupils: Pupils are equal, round, and reactive to light.   Cardiovascular:      Rate and Rhythm: Normal rate and regular rhythm.      Heart sounds: Normal heart sounds.   Pulmonary:      Effort: Pulmonary effort is normal.      Breath sounds: Normal breath sounds.   Abdominal:      General: Abdomen is flat. Bowel sounds are normal.      Palpations: Abdomen is soft.   Musculoskeletal:      Cervical back: Normal range of motion.      Comments: Legs wounds, less edema and redness   Neurological:      Mental Status: She is alert.         Antibiotics  metoprolol succinate (Toprol-XL) 24 hr tablet  heparin (porcine) injection 5,000 Units  polyethylene glycol (Glycolax, Miralax) packet 17 g  glucagon (Glucagen) injection 1 mg  dextrose 50 % injection 25 g  glucagon (Glucagen) injection 1 mg  dextrose 50 % injection 12.5 g  insulin lispro (HumaLOG) injection 0-10 Units  acetaminophen (Tylenol) tablet 975 mg  albuterol 90 mcg/actuation inhaler 2 puff  venlafaxine XR (Effexor-XR) 24 hr capsule 150 mg  pantoprazole (ProtoNix) EC tablet 40 mg  metoprolol succinate XL (Toprol-XL) 24 hr tablet 50 mg  levothyroxine (Synthroid, Levoxyl) tablet 250 mcg  ferrous gluconate (Fergon) 324 (38 Fe) mg tablet 324 mg  clopidogrel (Plavix) tablet 75 mg  atorvastatin (Lipitor) tablet 40  mg  aspirin EC tablet 81 mg  amiodarone (Pacerone) tablet 200 mg  vancomycin (Vancocin) pharmacy to dose - pharmacy monitoring  vancomycin in dextrose 5 % (Vancocin) IVPB 750 mg  alteplase (Cathflo Activase) injection 2 mg  sodium chloride 0.9% flush 10 mL  sodium chloride 0.9% flush 10 mL      Relevant Results  Labs  Results from last 72 hours   Lab Units 05/17/24  0840 05/16/24  0553 05/15/24  0606 05/14/24  1518   WBC AUTO x10*3/uL 8.9 8.9 9.0 9.5   HEMOGLOBIN g/dL 9.5* 9.4* 9.9* 9.8*   HEMATOCRIT % 34.2* 33.7* 35.6* 33.9*   PLATELETS AUTO x10*3/uL 300 313 294 297   NEUTROS PCT AUTO %  --   --   --  67.4   LYMPHS PCT AUTO %  --   --   --  12.3   MONOS PCT AUTO %  --   --   --  10.2   EOS PCT AUTO %  --   --   --  8.9     Results from last 72 hours   Lab Units 05/17/24  0840 05/16/24  0553 05/15/24  0606   SODIUM mmol/L 134* 139 139   POTASSIUM mmol/L 4.1 4.6 3.8   CHLORIDE mmol/L 98 101 102   CO2 mmol/L 33* 32 33*   BUN mg/dL 17 23 17   CREATININE mg/dL 2.14* 2.19* 1.83*   GLUCOSE mg/dL 152* 132* 101*   CALCIUM mg/dL 7.8* 7.8* 7.7*   ANION GAP mmol/L 7* 11 8*   EGFR mL/min/1.73m*2 24* 24* 29*   PHOSPHORUS mg/dL 3.8 4.6 3.1     Results from last 72 hours   Lab Units 05/17/24  0840 05/16/24  0553 05/15/24  0606 05/14/24  1518   ALK PHOS U/L  --   --   --  93   BILIRUBIN TOTAL mg/dL  --   --   --  0.4   PROTEIN TOTAL g/dL  --   --   --  6.1*   ALT U/L  --   --   --  7   AST U/L  --   --   --  20   ALBUMIN g/dL 2.6* 2.5* 2.5* 2.7*     Estimated Creatinine Clearance: 25.3 mL/min (A) (by C-G formula based on SCr of 2.14 mg/dL (H)).  C-Reactive Protein   Date Value Ref Range Status   05/15/2024 5.41 (H) <1.00 mg/dL Final     CRP   Date Value Ref Range Status   05/06/2021 8.9 (H) 0 - 2.0 MG/DL Final     Comment:     Performed at [address] [id number]   06/04/2020 2.3 (H) 0 - 2.0 MG/DL Final     Comment:     Performed at [address] [address]      Microbiology  Reviewed  Imaging  reviewed        Assessment/Plan     Legs none healing wounds / infection, rule out calciphylaxis      Recommendations :  Continue Vancomycin  Discussed with the medical team     I spent minutes in the professional and overall care of this patient.      Myke Chavarria MD

## 2024-05-17 NOTE — NURSING NOTE
Plan for care of BLE wounds discussed with attending provider Dr. Hunter.  She has spoken with podiatry - the plan is for biopsy and then follow up at our [facility].   Reached out to Dr. Jc to assure the dressings chosen were best - she was in agreement with the plan, orders obtained from Dr. Hunter for daily care using Medihoney, Aquacel, cover dressings.

## 2024-05-17 NOTE — PROGRESS NOTES
ESRD on HD stable  HTNsive heart and kidney disease    - No need for HD today - plan for reg HD tomorrow

## 2024-05-17 NOTE — NURSING NOTE
Assumed care of patient.    Patient has Heparin drip running.     0730- leaving floor for VQ scan.  0800- Patient returned to floor, breakfast set up for patient.    Patient refused new IV even though IV pump continues to beep due to position of IV placement.     Patient leaving floor for US  1430- Patient returned to floor     DENNIS SOUAZ, LPN

## 2024-05-17 NOTE — PROGRESS NOTES
05/17/24 0829   Discharge Planning   Living Arrangements Alone  (friends stay with her from time to time)   Support Systems Family members;Friends/neighbors;Scientology/alise community   Assistance Needed Per nephew, who is patient's POA, patient lives alone in a one-story home. Nephew states patient's friends will stay with her at times to assist her as needed. Patient receives dialysis at [facility] [id number] at [address] am. Nephew transports patient to dialysis, patient does not drive. Patient is A&O X3, on room air at baseline, is mostly independent with occasional assistance needed after dialysis with ADLs and uses a walker for ambulation.   Type of Residence Private residence   Number of Stairs to Enter Residence 5   Number of Stairs Within Residence 0   Do you have animals or pets at home? Yes   Type of Animals or Pets cat   Who is requesting discharge planning? Provider   Home or Post Acute Services In home services   Type of Home Care Services Home health aide;Home PT;Home nursing visits   Patient expects to be discharged to: TBD pending PT OT. Likely new  Home Health   Does the patient need discharge transport arranged? Yes   RoundTrip coordination needed? Yes   Has discharge transport been arranged? No

## 2024-05-18 ENCOUNTER — APPOINTMENT (OUTPATIENT)
Dept: DIALYSIS | Facility: HOSPITAL | Age: 69
End: 2024-05-18
Payer: MEDICARE

## 2024-05-18 ENCOUNTER — APPOINTMENT (OUTPATIENT)
Dept: RADIOLOGY | Facility: HOSPITAL | Age: 69
DRG: 602 | End: 2024-05-18
Payer: MEDICARE

## 2024-05-18 LAB
ALBUMIN SERPL BCP-MCNC: 2.6 G/DL (ref 3.4–5)
ALBUMIN SERPL BCP-MCNC: 2.7 G/DL (ref 3.4–5)
ANION GAP SERPL CALC-SCNC: 16 MMOL/L (ref 10–20)
ANION GAP SERPL CALC-SCNC: 9 MMOL/L (ref 10–20)
BUN SERPL-MCNC: 18 MG/DL (ref 6–23)
BUN SERPL-MCNC: 25 MG/DL (ref 6–23)
CALCIUM SERPL-MCNC: 7.5 MG/DL (ref 8.6–10.3)
CALCIUM SERPL-MCNC: 7.9 MG/DL (ref 8.6–10.3)
CHLORIDE SERPL-SCNC: 100 MMOL/L (ref 98–107)
CHLORIDE SERPL-SCNC: 95 MMOL/L (ref 98–107)
CO2 SERPL-SCNC: 30 MMOL/L (ref 21–32)
CO2 SERPL-SCNC: 31 MMOL/L (ref 21–32)
CREAT SERPL-MCNC: 2.07 MG/DL (ref 0.5–1.05)
CREAT SERPL-MCNC: 2.56 MG/DL (ref 0.5–1.05)
EGFRCR SERPLBLD CKD-EPI 2021: 20 ML/MIN/1.73M*2
EGFRCR SERPLBLD CKD-EPI 2021: 25 ML/MIN/1.73M*2
ERYTHROCYTE [DISTWIDTH] IN BLOOD BY AUTOMATED COUNT: 16.4 % (ref 11.5–14.5)
GLUCOSE BLD MANUAL STRIP-MCNC: 110 MG/DL (ref 74–99)
GLUCOSE BLD MANUAL STRIP-MCNC: 144 MG/DL (ref 74–99)
GLUCOSE BLD MANUAL STRIP-MCNC: 149 MG/DL (ref 74–99)
GLUCOSE BLD MANUAL STRIP-MCNC: 179 MG/DL (ref 74–99)
GLUCOSE SERPL-MCNC: 132 MG/DL (ref 74–99)
GLUCOSE SERPL-MCNC: 161 MG/DL (ref 74–99)
HCT VFR BLD AUTO: 33.5 % (ref 36–46)
HGB BLD-MCNC: 9.3 G/DL (ref 12–16)
MAGNESIUM SERPL-MCNC: 2.04 MG/DL (ref 1.6–2.4)
MCH RBC QN AUTO: 28.1 PG (ref 26–34)
MCHC RBC AUTO-ENTMCNC: 27.8 G/DL (ref 32–36)
MCV RBC AUTO: 101 FL (ref 80–100)
NRBC BLD-RTO: 0.2 /100 WBCS (ref 0–0)
PHOSPHATE SERPL-MCNC: 3.3 MG/DL (ref 2.5–4.9)
PHOSPHATE SERPL-MCNC: 4.5 MG/DL (ref 2.5–4.9)
PLATELET # BLD AUTO: 328 X10*3/UL (ref 150–450)
POTASSIUM SERPL-SCNC: 3.9 MMOL/L (ref 3.5–5.3)
POTASSIUM SERPL-SCNC: 4 MMOL/L (ref 3.5–5.3)
RBC # BLD AUTO: 3.31 X10*6/UL (ref 4–5.2)
SODIUM SERPL-SCNC: 136 MMOL/L (ref 136–145)
SODIUM SERPL-SCNC: 137 MMOL/L (ref 136–145)
UFH PPP CHRO-ACNC: 0.2 IU/ML
VANCOMYCIN SERPL-MCNC: 10.8 UG/ML (ref 5–20)
WBC # BLD AUTO: 9.7 X10*3/UL (ref 4.4–11.3)

## 2024-05-18 PROCEDURE — 85520 HEPARIN ASSAY: CPT

## 2024-05-18 PROCEDURE — 2500000001 HC RX 250 WO HCPCS SELF ADMINISTERED DRUGS (ALT 637 FOR MEDICARE OP)

## 2024-05-18 PROCEDURE — 2550000001 HC RX 255 CONTRASTS: Performed by: INTERNAL MEDICINE

## 2024-05-18 PROCEDURE — 1100000001 HC PRIVATE ROOM DAILY

## 2024-05-18 PROCEDURE — 94760 N-INVAS EAR/PLS OXIMETRY 1: CPT

## 2024-05-18 PROCEDURE — 80202 ASSAY OF VANCOMYCIN: CPT | Performed by: INTERNAL MEDICINE

## 2024-05-18 PROCEDURE — 1090000001 HH PPS REVENUE CREDIT

## 2024-05-18 PROCEDURE — 83735 ASSAY OF MAGNESIUM: CPT

## 2024-05-18 PROCEDURE — 2500000006 HC RX 250 W HCPCS SELF ADMINISTERED DRUGS (ALT 637 FOR ALL PAYERS)

## 2024-05-18 PROCEDURE — 1090000002 HH PPS REVENUE DEBIT

## 2024-05-18 PROCEDURE — 9420000001 HC RT PATIENT EDUCATION 5 MIN

## 2024-05-18 PROCEDURE — 80069 RENAL FUNCTION PANEL: CPT | Mod: 91,MUE

## 2024-05-18 PROCEDURE — 2500000004 HC RX 250 GENERAL PHARMACY W/ HCPCS (ALT 636 FOR OP/ED)

## 2024-05-18 PROCEDURE — 2500000002 HC RX 250 W HCPCS SELF ADMINISTERED DRUGS (ALT 637 FOR MEDICARE OP, ALT 636 FOR OP/ED): Performed by: INTERNAL MEDICINE

## 2024-05-18 PROCEDURE — 71275 CT ANGIOGRAPHY CHEST: CPT | Performed by: STUDENT IN AN ORGANIZED HEALTH CARE EDUCATION/TRAINING PROGRAM

## 2024-05-18 PROCEDURE — 85027 COMPLETE CBC AUTOMATED: CPT

## 2024-05-18 PROCEDURE — 80069 RENAL FUNCTION PANEL: CPT

## 2024-05-18 PROCEDURE — 71275 CT ANGIOGRAPHY CHEST: CPT

## 2024-05-18 PROCEDURE — 94640 AIRWAY INHALATION TREATMENT: CPT

## 2024-05-18 PROCEDURE — 8010000001 HC DIALYSIS - HEMODIALYSIS PER DAY

## 2024-05-18 PROCEDURE — 2500000004 HC RX 250 GENERAL PHARMACY W/ HCPCS (ALT 636 FOR OP/ED): Performed by: INTERNAL MEDICINE

## 2024-05-18 PROCEDURE — 2500000005 HC RX 250 GENERAL PHARMACY W/O HCPCS: Performed by: INTERNAL MEDICINE

## 2024-05-18 PROCEDURE — 99232 SBSQ HOSP IP/OBS MODERATE 35: CPT

## 2024-05-18 PROCEDURE — 82947 ASSAY GLUCOSE BLOOD QUANT: CPT

## 2024-05-18 RX ORDER — MUPIROCIN 20 MG/G
OINTMENT TOPICAL 3 TIMES DAILY
Status: DISCONTINUED | OUTPATIENT
Start: 2024-05-18 | End: 2024-05-20 | Stop reason: HOSPADM

## 2024-05-18 RX ORDER — HEPARIN SODIUM 5000 [USP'U]/ML
5000 INJECTION, SOLUTION INTRAVENOUS; SUBCUTANEOUS EVERY 8 HOURS
Status: DISCONTINUED | OUTPATIENT
Start: 2024-05-18 | End: 2024-05-20 | Stop reason: HOSPADM

## 2024-05-18 RX ADMIN — ACETAMINOPHEN 975 MG: 325 TABLET ORAL at 18:45

## 2024-05-18 RX ADMIN — ASPIRIN 81 MG: 81 TABLET, COATED ORAL at 08:44

## 2024-05-18 RX ADMIN — HEPARIN SODIUM 1900 UNITS: 1000 INJECTION INTRAVENOUS; SUBCUTANEOUS at 17:41

## 2024-05-18 RX ADMIN — PANTOPRAZOLE SODIUM 40 MG: 40 TABLET, DELAYED RELEASE ORAL at 20:45

## 2024-05-18 RX ADMIN — IPRATROPIUM BROMIDE AND ALBUTEROL SULFATE 3 ML: 2.5; .5 SOLUTION RESPIRATORY (INHALATION) at 08:49

## 2024-05-18 RX ADMIN — LEVOTHYROXINE SODIUM 250 MCG: 100 TABLET ORAL at 06:20

## 2024-05-18 RX ADMIN — ATORVASTATIN CALCIUM 40 MG: 40 TABLET, FILM COATED ORAL at 08:44

## 2024-05-18 RX ADMIN — METOPROLOL SUCCINATE 50 MG: 50 TABLET, EXTENDED RELEASE ORAL at 08:44

## 2024-05-18 RX ADMIN — CLOPIDOGREL 75 MG: 75 TABLET ORAL at 08:44

## 2024-05-18 RX ADMIN — IPRATROPIUM BROMIDE AND ALBUTEROL SULFATE 3 ML: 2.5; .5 SOLUTION RESPIRATORY (INHALATION) at 19:20

## 2024-05-18 RX ADMIN — FERROUS GLUCONATE 324 MG: 324 TABLET ORAL at 08:44

## 2024-05-18 RX ADMIN — PANTOPRAZOLE SODIUM 40 MG: 40 TABLET, DELAYED RELEASE ORAL at 08:44

## 2024-05-18 RX ADMIN — AMIODARONE HYDROCHLORIDE 200 MG: 200 TABLET ORAL at 08:00

## 2024-05-18 RX ADMIN — VANCOMYCIN HYDROCHLORIDE 750 MG: 750 INJECTION, SOLUTION INTRAVENOUS at 18:33

## 2024-05-18 RX ADMIN — VENLAFAXINE HYDROCHLORIDE 150 MG: 150 CAPSULE, EXTENDED RELEASE ORAL at 08:45

## 2024-05-18 RX ADMIN — MUPIROCIN: 20 OINTMENT TOPICAL at 20:44

## 2024-05-18 RX ADMIN — HEPARIN SODIUM 5000 UNITS: 5000 INJECTION INTRAVENOUS; SUBCUTANEOUS at 21:00

## 2024-05-18 RX ADMIN — IOHEXOL 75 ML: 350 INJECTION, SOLUTION INTRAVENOUS at 09:58

## 2024-05-18 RX ADMIN — HEPARIN SODIUM 1900 UNITS: 1000 INJECTION INTRAVENOUS; SUBCUTANEOUS at 17:40

## 2024-05-18 ASSESSMENT — COGNITIVE AND FUNCTIONAL STATUS - GENERAL
CLIMB 3 TO 5 STEPS WITH RAILING: A LOT
MOVING FROM LYING ON BACK TO SITTING ON SIDE OF FLAT BED WITH BEDRAILS: A LITTLE
MOVING TO AND FROM BED TO CHAIR: A LITTLE
MOVING FROM LYING ON BACK TO SITTING ON SIDE OF FLAT BED WITH BEDRAILS: A LITTLE
MOBILITY SCORE: 17
TURNING FROM BACK TO SIDE WHILE IN FLAT BAD: A LITTLE
DRESSING REGULAR LOWER BODY CLOTHING: A LITTLE
DRESSING REGULAR UPPER BODY CLOTHING: A LITTLE
CLIMB 3 TO 5 STEPS WITH RAILING: A LOT
WALKING IN HOSPITAL ROOM: A LITTLE
TURNING FROM BACK TO SIDE WHILE IN FLAT BAD: A LITTLE
MOVING TO AND FROM BED TO CHAIR: A LITTLE
STANDING UP FROM CHAIR USING ARMS: A LITTLE
HELP NEEDED FOR BATHING: A LITTLE
TOILETING: A LITTLE
STANDING UP FROM CHAIR USING ARMS: A LITTLE
WALKING IN HOSPITAL ROOM: A LITTLE
MOBILITY SCORE: 17
DAILY ACTIVITIY SCORE: 20

## 2024-05-18 ASSESSMENT — PAIN - FUNCTIONAL ASSESSMENT
PAIN_FUNCTIONAL_ASSESSMENT: 0-10
PAIN_FUNCTIONAL_ASSESSMENT: 0-10

## 2024-05-18 ASSESSMENT — PAIN SCALES - GENERAL
PAINLEVEL_OUTOF10: 8
PAINLEVEL_OUTOF10: 0 - NO PAIN
PAINLEVEL_OUTOF10: 0 - NO PAIN

## 2024-05-18 ASSESSMENT — PAIN DESCRIPTION - LOCATION: LOCATION: HEAD

## 2024-05-18 NOTE — PROGRESS NOTES
Daphne Morris is a 69 y.o. female on day 3 of admission presenting with Cellulitis.      Subjective   Patient seen today. She has no new complaints. Vitals are all within normal limits     Objective     Last Recorded Vitals  BP (!) 185/61   Pulse 64   Temp 36.9 °C (98.4 °F)   Resp 18   Wt 74.8 kg (164 lb 14.5 oz)   SpO2 95%   Intake/Output last 3 Shifts:    Intake/Output Summary (Last 24 hours) at 5/18/2024 1410  Last data filed at 5/18/2024 0242  Gross per 24 hour   Intake 220.81 ml   Output --   Net 220.81 ml       Admission Weight  Weight: 76.7 kg (169 lb) (05/15/24 0153)    Daily Weight  05/16/24 : 74.8 kg (164 lb 14.5 oz)    Image Results  CT angio chest for pulmonary embolism  Narrative: Interpreted By:  Baljinder Lopez,   STUDY:  CT ANGIO CHEST FOR PULMONARY EMBOLISM;  5/18/2024 9:54 am      INDICATION:  Signs/Symptoms:PE r/o.      COMPARISON:  Chest CT scan 02/01/2024.      ACCESSION NUMBER(S):  [id number]      ORDERING CLINICIAN:  DULCE CHENEY      TECHNIQUE:  Helical data acquisition of the chest was obtained after intravenous  administration of 75 ML Omnipaque 350, as per PE protocol. Images  were reformatted in coronal and sagittal planes. Axial and coronal  maximum intensity projection (MIP) images were created and reviewed.      FINDINGS:  POTENTIAL LIMITATIONS OF THE STUDY: None      HEART AND VESSELS:  There are no discrete filling defects within main pulmonary artery  and its branches to suggest acute pulmonary embolism. Main pulmonary  artery and its branches are normal in caliber.      The thoracic aorta normal in course and caliber.  No coronary artery calcifications are seen. Please note, the study is  not optimized for evaluation of coronary arteries. Right central  venous catheter terminating in the cavoatrial junction.      Heart:  Mild cardiomegaly. Trace pericardial effusion. Left atrial appendage  closure device with evidence of small thrombus.      MEDIASTINUM AND TAMMIE, LOWER NECK  AND AXILLA:  Peripherally calcific left thyroid nodule measuring 1.1 cm.  No evidence of thoracic lymphadenopathy by CT criteria.  Esophagus appears within normal limits as seen.      LUNGS AND AIRWAYS:  Moderate right-sided pleural effusion with improved previously seen  subsegmental atelectasis involving the right lower lobe and right  middle lobe. Trace left-sided pleural effusion with surrounding  atelectasis. Stable scattered tree-in-bud infiltrates in the right  lung.      UPPER ABDOMEN:  The visualized subdiaphragmatic structures demonstrate no remarkable  findings.      CHEST WALL AND OSSEOUS STRUCTURES:  Chest wall is within normal limits.  No acute osseous pathology.There are no suspicious osseous lesions.      Impression: 1. No major central or segmental pulmonary embolism.  2. Significant right-sided pleural effusion with surrounding  subsegmental atelectasis. Trace left-sided pleural effusion with  surrounding atelectasis. Stable tree-in-bud infiltrates noted in the  right lung be infectious.  3. Mild cardiomegaly. Trace pericardial effusion. Left atrial  appendage closure device with evidence of small thrombus.  4. Peripherally calcific left thyroid nodule measuring 1.1 cm as in  prior. This could be further assessed by dedicated nonemergent  thyroid ultrasound if clinically desired.      MACRO:  None      Signed by: Baljinder Lopez 5/18/2024 12:01 PM  Dictation workstation:   [id number]      Physical Exam  Constitutional:       Appearance: Normal appearance.   Cardiovascular:      Pulses: Normal pulses.      Heart sounds: Normal heart sounds.   Pulmonary:      Effort: Pulmonary effort is normal.      Breath sounds: Normal breath sounds.   Abdominal:      Palpations: Abdomen is soft.   Skin:     General: Skin is warm and dry.   Neurological:      General: No focal deficit present.      Mental Status: She is alert and oriented to person, place, and time.   Psychiatric:         Mood and Affect: Mood  normal.         Behavior: Behavior normal.         Relevant Results  Results for orders placed or performed during the hospital encounter of 05/15/24 (from the past 24 hour(s))   Heparin Assay, UFH   Result Value Ref Range    Heparin Unfractionated 0.3 See Comment Below for Therapeutic Ranges IU/mL   POCT GLUCOSE   Result Value Ref Range    POCT Glucose 142 (H) 74 - 99 mg/dL   POCT GLUCOSE   Result Value Ref Range    POCT Glucose 236 (H) 74 - 99 mg/dL   CBC   Result Value Ref Range    WBC 9.7 4.4 - 11.3 x10*3/uL    nRBC 0.2 (H) 0.0 - 0.0 /100 WBCs    RBC 3.31 (L) 4.00 - 5.20 x10*6/uL    Hemoglobin 9.3 (L) 12.0 - 16.0 g/dL    Hematocrit 33.5 (L) 36.0 - 46.0 %     (H) 80 - 100 fL    MCH 28.1 26.0 - 34.0 pg    MCHC 27.8 (L) 32.0 - 36.0 g/dL    RDW 16.4 (H) 11.5 - 14.5 %    Platelets 328 150 - 450 x10*3/uL   Renal function panel   Result Value Ref Range    Glucose 161 (H) 74 - 99 mg/dL    Sodium 137 136 - 145 mmol/L    Potassium 4.0 3.5 - 5.3 mmol/L    Chloride 95 (L) 98 - 107 mmol/L    Bicarbonate 30 21 - 32 mmol/L    Anion Gap 16 10 - 20 mmol/L    Urea Nitrogen 25 (H) 6 - 23 mg/dL    Creatinine 2.56 (H) 0.50 - 1.05 mg/dL    eGFR 20 (L) >60 mL/min/1.73m*2    Calcium 7.9 (L) 8.6 - 10.3 mg/dL    Phosphorus 4.5 2.5 - 4.9 mg/dL    Albumin 2.7 (L) 3.4 - 5.0 g/dL   Magnesium   Result Value Ref Range    Magnesium 2.04 1.60 - 2.40 mg/dL   Vancomycin   Result Value Ref Range    Vancomycin 10.8 5.0 - 20.0 ug/mL   Heparin Assay, UFH   Result Value Ref Range    Heparin Unfractionated 0.2 See Comment Below for Therapeutic Ranges IU/mL   POCT GLUCOSE   Result Value Ref Range    POCT Glucose 144 (H) 74 - 99 mg/dL   POCT GLUCOSE   Result Value Ref Range    POCT Glucose 149 (H) 74 - 99 mg/dL          Assessment/Plan    Principal Problem:    Cellulitis  Active Problems:    ESRD on dialysis (Multi)    Daphne Morris is a 69 y.o. female presenting with a PMHx of recurrent falls, cellulitis, CKD on dialysis, HTN, T2DM on insulin,  anemia secondary to CKD, hypothyroidism, afib not on AC, and depression with anxiety who presents from [facility] for outpatient failure of abx for cellulitis.     #Cellulitis? Vs. Pyoderma Gangrenosum vs. Ecthyma Gangrenosum vs. Calciphylaxis vs. Other ulcerative inflammatory skin disease  -follow blood cultures  -wound care nurse consulted  -consider wound culture  -do not advise wound debridement, as it is an absolute contraindication in pyoderma gangrenosum  -consider outpatient dermatology follow up or inpatient evaluation, pt will require skin biopsy of ulcer  -pain control with tylenol PRN  -Continue vancomycin      Chronic Medical Issues:   #CKD  -dialysis T, R, Saturday  -daily RFP  -Consulted Nephrology     #HTN #HLD  -continue aspirin, clopidogrel  -Continue metoprolol     #CHF  -managed with dialysis, not on diuretics     #paroxysmal afib  -continue home amiodarone  -not on anticoagulation     #T2DM on insulin  -SSI #2  -holding home lantus (10 units nightly) in setting of possible infection, may require reduced dose     #Anemia  -secondary to CKD, stable  -continue home ferrous gluconate     #Hypothyroidism  -continue home levothyroxine     #Depression/Anxiety  -continue home venlafaxine     #GERD  -continue home pantoprazole      F: prn  E: prn  N: diabetic diet  DVT ppx: heparin subcutaneous  GI ppx: home pantoprazole  Code status DNR/DNI     Dispo: Patient still requiring IV antibiotic for cellulitis    Jazmine Segal MD

## 2024-05-18 NOTE — POST-PROCEDURE NOTE
Report to Receiving RN:    Report To: maryellen Lopez  Time Report Called: [id number]   Hand-Off Communication: Tx is complete. She removed 2L w/o issue. Post Tx BP Is 182/94 62. Pt remained asymptomatic w/o complaint.   Complications During Treatment: No  Ultrafiltration Treatment: Yes, 2L removed  Medications Administered During Dialysis: Yes, see EMR  Blood Products Administered During Dialysis: No  Labs Sent During Dialysis: No  Heparin Drip Rate Changes: No  Dialysis Catheter Dressing: Clean dry and intact  Last Dressing Change: 5/16/24    Electronic Signatures:  Elle Mcguire OCDT    Last Updated: 5:55 PM by ELLE MCGUIRE

## 2024-05-18 NOTE — PROGRESS NOTES
"Vancomycin Dosing by Pharmacy- FOLLOW UP    Daphne Morris      69YF Ht 5'6\" Wt 164# (75kg) Hospital Day #3/Vancomycin Day #3   Pharmacy has been consulted for vancomycin dosing for cellulitis, skin and soft tissue (non-healing leg wounds r/o calciphylaxis)   Based on the patient's indication and renal status this patient is being dosed based on a goal trough/random level of 10-15.     Renal function is currently stable--- pt has ESRD on HD Tu,Th,Sa    Current vancomycin dose: 750 mg post HD Tu,Th,Sa    Most recent random level: 10.8 mcg/mL today w/ am labs    Visit Vitals  BP (!) 185/61   Pulse 64   Temp 36.9 °C (98.4 °F)   Resp 18        Lab Results   Component Value Date    CREATININE 2.56 (H) 05/18/2024    CREATININE 2.14 (H) 05/17/2024    CREATININE 2.19 (H) 05/16/2024    CREATININE 1.83 (H) 05/15/2024         Assessment/Plan    Within goal random/trough level  The next level will be obtained on 5/21 at am labs.  Follow for continued vancomycin needs, clinical response, and signs/symptoms of toxicity.       Brennen Noe, PharmD           "

## 2024-05-18 NOTE — PRE-PROCEDURE NOTE
Report from Sending RN:    Report From: Rohini Lopez RN to Malick Andre RN  Recent Surgery of Procedure: No  Baseline Level of Consciousness (LOC): AOx3  Oxygen Use: Yes, 2L  Type: NC  Diabetic: Yes, 149 @lunch  Last BP Med Given Day of Dialysis: see EMR  Last Pain Med Given: see EMR  Lab Tests to be Obtained with Dialysis: No  Blood Transfusion to be Given During Dialysis: No  Available IV Access: Yes  Medications to be Administered During Dialysis: Yes, heparin drip  Continuous IV Infusion Running: Yes, heparin drip  Restraints on Currently or in the Last 24 Hours: No  Hand-Off Communication: Pt is stable. She had CT w/contrast this morning before HD. Pt arrived to the HD room w/o complaint  Dialysis Catheter Dressing: Dry and intact  Last Dressing Change: 5/16/24

## 2024-05-18 NOTE — PROGRESS NOTES
Daphne Morris is a 69 y.o. female on day 3 of admission presenting with Cellulitis.    Subjective   Interval History: no fever, no new complaints        Review of Systems    Objective   Range of Vitals (last 24 hours)  Heart Rate:  [64-67]   Temp:  [36.1 °C (97 °F)-36.9 °C (98.4 °F)]   Resp:  [18]   BP: (174-185)/(61-74)   SpO2:  [93 %-100 %]   Daily Weight  05/16/24 : 74.8 kg (164 lb 14.5 oz)    Body mass index is 26.63 kg/m².    Physical Exam  Constitutional:       Appearance: Normal appearance.   HENT:      Head: Normocephalic and atraumatic.      Mouth/Throat:      Mouth: Mucous membranes are moist.      Pharynx: Oropharynx is clear.   Eyes:      Pupils: Pupils are equal, round, and reactive to light.   Cardiovascular:      Rate and Rhythm: Normal rate and regular rhythm.      Heart sounds: Normal heart sounds.   Pulmonary:      Effort: Pulmonary effort is normal.      Breath sounds: Normal breath sounds.   Abdominal:      General: Abdomen is flat. Bowel sounds are normal.      Palpations: Abdomen is soft.   Musculoskeletal:      Cervical back: Normal range of motion.      Comments: Legs wounds, less edema and redness   Neurological:      Mental Status: She is alert.         Antibiotics  metoprolol succinate (Toprol-XL) 24 hr tablet  heparin (porcine) injection 5,000 Units  polyethylene glycol (Glycolax, Miralax) packet 17 g  glucagon (Glucagen) injection 1 mg  dextrose 50 % injection 25 g  glucagon (Glucagen) injection 1 mg  dextrose 50 % injection 12.5 g  insulin lispro (HumaLOG) injection 0-10 Units  acetaminophen (Tylenol) tablet 975 mg  albuterol 90 mcg/actuation inhaler 2 puff  venlafaxine XR (Effexor-XR) 24 hr capsule 150 mg  pantoprazole (ProtoNix) EC tablet 40 mg  metoprolol succinate XL (Toprol-XL) 24 hr tablet 50 mg  levothyroxine (Synthroid, Levoxyl) tablet 250 mcg  ferrous gluconate (Fergon) 324 (38 Fe) mg tablet 324 mg  clopidogrel (Plavix) tablet 75 mg  atorvastatin (Lipitor) tablet 40  mg  aspirin EC tablet 81 mg  amiodarone (Pacerone) tablet 200 mg  vancomycin (Vancocin) pharmacy to dose - pharmacy monitoring  vancomycin in dextrose 5 % (Vancocin) IVPB 750 mg  alteplase (Cathflo Activase) injection 2 mg  sodium chloride 0.9% flush 10 mL  sodium chloride 0.9% flush 10 mL      Relevant Results  Labs  Results from last 72 hours   Lab Units 05/18/24  0602 05/17/24  0840 05/16/24  0553   WBC AUTO x10*3/uL 9.7 8.9 8.9   HEMOGLOBIN g/dL 9.3* 9.5* 9.4*   HEMATOCRIT % 33.5* 34.2* 33.7*   PLATELETS AUTO x10*3/uL 328 300 313     Results from last 72 hours   Lab Units 05/18/24  0602 05/17/24  0840 05/16/24  0553   SODIUM mmol/L 137 134* 139   POTASSIUM mmol/L 4.0 4.1 4.6   CHLORIDE mmol/L 95* 98 101   CO2 mmol/L 30 33* 32   BUN mg/dL 25* 17 23   CREATININE mg/dL 2.56* 2.14* 2.19*   GLUCOSE mg/dL 161* 152* 132*   CALCIUM mg/dL 7.9* 7.8* 7.8*   ANION GAP mmol/L 16 7* 11   EGFR mL/min/1.73m*2 20* 24* 24*   PHOSPHORUS mg/dL 4.5 3.8 4.6     Results from last 72 hours   Lab Units 05/18/24  0602 05/17/24  0840 05/16/24  0553   ALBUMIN g/dL 2.7* 2.6* 2.5*     Estimated Creatinine Clearance: 21.1 mL/min (A) (by C-G formula based on SCr of 2.56 mg/dL (H)).  C-Reactive Protein   Date Value Ref Range Status   05/15/2024 5.41 (H) <1.00 mg/dL Final     CRP   Date Value Ref Range Status   05/06/2021 8.9 (H) 0 - 2.0 MG/DL Final     Comment:     Performed at [address] [address]   06/04/2020 2.3 (H) 0 - 2.0 MG/DL Final     Comment:     Performed at [address] [address]     Microbiology  Reviewed  Imaging  reviewed        Assessment/Plan     Legs none healing wounds / infection, rule out calciphylaxis      Recommendations :  Continue Vancomycin  Discussed with the medical team     I spent minutes in the professional and overall care of this patient.      Myke Chavarria MD

## 2024-05-19 ENCOUNTER — APPOINTMENT (OUTPATIENT)
Dept: RADIOLOGY | Facility: HOSPITAL | Age: 69
DRG: 602 | End: 2024-05-19
Payer: MEDICARE

## 2024-05-19 LAB
ALBUMIN SERPL BCP-MCNC: 2.5 G/DL (ref 3.4–5)
ANION GAP SERPL CALC-SCNC: 7 MMOL/L (ref 10–20)
BACTERIA BLD CULT: NORMAL
BACTERIA BLD CULT: NORMAL
BUN SERPL-MCNC: 14 MG/DL (ref 6–23)
CALCIUM SERPL-MCNC: 7.8 MG/DL (ref 8.6–10.3)
CHLORIDE SERPL-SCNC: 98 MMOL/L (ref 98–107)
CO2 SERPL-SCNC: 31 MMOL/L (ref 21–32)
CREAT SERPL-MCNC: 1.91 MG/DL (ref 0.5–1.05)
EGFRCR SERPLBLD CKD-EPI 2021: 28 ML/MIN/1.73M*2
ERYTHROCYTE [DISTWIDTH] IN BLOOD BY AUTOMATED COUNT: 16.3 % (ref 11.5–14.5)
GLUCOSE BLD MANUAL STRIP-MCNC: 134 MG/DL (ref 74–99)
GLUCOSE BLD MANUAL STRIP-MCNC: 150 MG/DL (ref 74–99)
GLUCOSE BLD MANUAL STRIP-MCNC: 199 MG/DL (ref 74–99)
GLUCOSE BLD MANUAL STRIP-MCNC: 213 MG/DL (ref 74–99)
GLUCOSE SERPL-MCNC: 145 MG/DL (ref 74–99)
HCT VFR BLD AUTO: 34.3 % (ref 36–46)
HGB BLD-MCNC: 9.5 G/DL (ref 12–16)
MAGNESIUM SERPL-MCNC: 1.84 MG/DL (ref 1.6–2.4)
MCH RBC QN AUTO: 27.9 PG (ref 26–34)
MCHC RBC AUTO-ENTMCNC: 27.7 G/DL (ref 32–36)
MCV RBC AUTO: 101 FL (ref 80–100)
NRBC BLD-RTO: 0 /100 WBCS (ref 0–0)
PHOSPHATE SERPL-MCNC: 3.7 MG/DL (ref 2.5–4.9)
PLATELET # BLD AUTO: 291 X10*3/UL (ref 150–450)
POTASSIUM SERPL-SCNC: 4.2 MMOL/L (ref 3.5–5.3)
RBC # BLD AUTO: 3.41 X10*6/UL (ref 4–5.2)
SODIUM SERPL-SCNC: 132 MMOL/L (ref 136–145)
WBC # BLD AUTO: 8.1 X10*3/UL (ref 4.4–11.3)

## 2024-05-19 PROCEDURE — 2500000002 HC RX 250 W HCPCS SELF ADMINISTERED DRUGS (ALT 637 FOR MEDICARE OP, ALT 636 FOR OP/ED): Performed by: INTERNAL MEDICINE

## 2024-05-19 PROCEDURE — 82947 ASSAY GLUCOSE BLOOD QUANT: CPT

## 2024-05-19 PROCEDURE — 2500000001 HC RX 250 WO HCPCS SELF ADMINISTERED DRUGS (ALT 637 FOR MEDICARE OP)

## 2024-05-19 PROCEDURE — 71045 X-RAY EXAM CHEST 1 VIEW: CPT | Performed by: RADIOLOGY

## 2024-05-19 PROCEDURE — 2500000005 HC RX 250 GENERAL PHARMACY W/O HCPCS: Performed by: INTERNAL MEDICINE

## 2024-05-19 PROCEDURE — 80069 RENAL FUNCTION PANEL: CPT

## 2024-05-19 PROCEDURE — 1090000001 HH PPS REVENUE CREDIT

## 2024-05-19 PROCEDURE — 85027 COMPLETE CBC AUTOMATED: CPT

## 2024-05-19 PROCEDURE — 32555 ASPIRATE PLEURA W/ IMAGING: CPT | Performed by: INTERNAL MEDICINE

## 2024-05-19 PROCEDURE — 2500000004 HC RX 250 GENERAL PHARMACY W/ HCPCS (ALT 636 FOR OP/ED)

## 2024-05-19 PROCEDURE — 83735 ASSAY OF MAGNESIUM: CPT

## 2024-05-19 PROCEDURE — 1100000001 HC PRIVATE ROOM DAILY

## 2024-05-19 PROCEDURE — 99232 SBSQ HOSP IP/OBS MODERATE 35: CPT

## 2024-05-19 PROCEDURE — 94640 AIRWAY INHALATION TREATMENT: CPT

## 2024-05-19 PROCEDURE — 2500000006 HC RX 250 W HCPCS SELF ADMINISTERED DRUGS (ALT 637 FOR ALL PAYERS)

## 2024-05-19 PROCEDURE — 71045 X-RAY EXAM CHEST 1 VIEW: CPT

## 2024-05-19 PROCEDURE — 94760 N-INVAS EAR/PLS OXIMETRY 1: CPT

## 2024-05-19 PROCEDURE — 1090000002 HH PPS REVENUE DEBIT

## 2024-05-19 PROCEDURE — 0W993ZZ DRAINAGE OF RIGHT PLEURAL CAVITY, PERCUTANEOUS APPROACH: ICD-10-PCS | Performed by: INTERNAL MEDICINE

## 2024-05-19 PROCEDURE — 99222 1ST HOSP IP/OBS MODERATE 55: CPT | Performed by: INTERNAL MEDICINE

## 2024-05-19 RX ADMIN — IPRATROPIUM BROMIDE AND ALBUTEROL SULFATE 3 ML: 2.5; .5 SOLUTION RESPIRATORY (INHALATION) at 08:25

## 2024-05-19 RX ADMIN — MUPIROCIN: 20 OINTMENT TOPICAL at 20:38

## 2024-05-19 RX ADMIN — HEPARIN SODIUM 5000 UNITS: 5000 INJECTION INTRAVENOUS; SUBCUTANEOUS at 23:04

## 2024-05-19 RX ADMIN — LEVOTHYROXINE SODIUM 250 MCG: 100 TABLET ORAL at 06:06

## 2024-05-19 RX ADMIN — AMIODARONE HYDROCHLORIDE 200 MG: 200 TABLET ORAL at 08:24

## 2024-05-19 RX ADMIN — Medication 3 L/MIN: at 08:25

## 2024-05-19 RX ADMIN — POLYETHYLENE GLYCOL 3350 17 G: 17 POWDER, FOR SOLUTION ORAL at 08:28

## 2024-05-19 RX ADMIN — HEPARIN SODIUM 5000 UNITS: 5000 INJECTION INTRAVENOUS; SUBCUTANEOUS at 06:06

## 2024-05-19 RX ADMIN — PANTOPRAZOLE SODIUM 40 MG: 40 TABLET, DELAYED RELEASE ORAL at 20:38

## 2024-05-19 RX ADMIN — METOPROLOL SUCCINATE 50 MG: 50 TABLET, EXTENDED RELEASE ORAL at 08:24

## 2024-05-19 RX ADMIN — FERROUS GLUCONATE 324 MG: 324 TABLET ORAL at 08:24

## 2024-05-19 RX ADMIN — IPRATROPIUM BROMIDE AND ALBUTEROL SULFATE 3 ML: 2.5; .5 SOLUTION RESPIRATORY (INHALATION) at 14:46

## 2024-05-19 RX ADMIN — IPRATROPIUM BROMIDE AND ALBUTEROL SULFATE 3 ML: 2.5; .5 SOLUTION RESPIRATORY (INHALATION) at 19:48

## 2024-05-19 RX ADMIN — ASPIRIN 81 MG: 81 TABLET, COATED ORAL at 08:24

## 2024-05-19 RX ADMIN — INSULIN LISPRO 2 UNITS: 100 INJECTION, SOLUTION INTRAVENOUS; SUBCUTANEOUS at 16:35

## 2024-05-19 RX ADMIN — MUPIROCIN: 20 OINTMENT TOPICAL at 10:56

## 2024-05-19 RX ADMIN — PANTOPRAZOLE SODIUM 40 MG: 40 TABLET, DELAYED RELEASE ORAL at 08:24

## 2024-05-19 RX ADMIN — HEPARIN SODIUM 5000 UNITS: 5000 INJECTION INTRAVENOUS; SUBCUTANEOUS at 15:33

## 2024-05-19 RX ADMIN — CLOPIDOGREL 75 MG: 75 TABLET ORAL at 08:24

## 2024-05-19 RX ADMIN — VENLAFAXINE HYDROCHLORIDE 150 MG: 150 CAPSULE, EXTENDED RELEASE ORAL at 08:23

## 2024-05-19 RX ADMIN — ACETAMINOPHEN 975 MG: 325 TABLET ORAL at 01:00

## 2024-05-19 RX ADMIN — ACETAMINOPHEN 975 MG: 325 TABLET ORAL at 20:56

## 2024-05-19 RX ADMIN — ATORVASTATIN CALCIUM 40 MG: 40 TABLET, FILM COATED ORAL at 08:23

## 2024-05-19 ASSESSMENT — COGNITIVE AND FUNCTIONAL STATUS - GENERAL
WALKING IN HOSPITAL ROOM: A LITTLE
MOVING FROM LYING ON BACK TO SITTING ON SIDE OF FLAT BED WITH BEDRAILS: A LITTLE
STANDING UP FROM CHAIR USING ARMS: A LITTLE
DRESSING REGULAR UPPER BODY CLOTHING: A LITTLE
DRESSING REGULAR LOWER BODY CLOTHING: A LITTLE
MOBILITY SCORE: 17
HELP NEEDED FOR BATHING: A LITTLE
TURNING FROM BACK TO SIDE WHILE IN FLAT BAD: A LITTLE
CLIMB 3 TO 5 STEPS WITH RAILING: A LOT
MOVING TO AND FROM BED TO CHAIR: A LITTLE
DAILY ACTIVITIY SCORE: 20
TOILETING: A LITTLE

## 2024-05-19 ASSESSMENT — PAIN - FUNCTIONAL ASSESSMENT
PAIN_FUNCTIONAL_ASSESSMENT: 0-10

## 2024-05-19 ASSESSMENT — PAIN SCALES - GENERAL
PAINLEVEL_OUTOF10: 3
PAINLEVEL_OUTOF10: 3
PAINLEVEL_OUTOF10: 0 - NO PAIN
PAINLEVEL_OUTOF10: 0 - NO PAIN

## 2024-05-19 ASSESSMENT — PAIN DESCRIPTION - LOCATION
LOCATION: HEAD
LOCATION: HEAD

## 2024-05-19 NOTE — PROGRESS NOTES
Daphne Morris is a 69 y.o. female on day 4 of admission presenting with Cellulitis.      Subjective   Patient seen today. Denies SOB, feels well. She has no new complaints. Vitals are all within normal limits     Objective     Last Recorded Vitals  /75 (BP Location: Right arm, Patient Position: Lying)   Pulse 62   Temp 36.3 °C (97.3 °F) (Temporal)   Resp 19   Wt 74.8 kg (164 lb 14.5 oz)   SpO2 98%   Intake/Output last 3 Shifts:    Intake/Output Summary (Last 24 hours) at 5/19/2024 1322  Last data filed at 5/19/2024 0542  Gross per 24 hour   Intake 1550 ml   Output 3306 ml   Net -1756 ml       Admission Weight  Weight: 76.7 kg (169 lb) (05/15/24 0153)    Daily Weight  05/16/24 : 74.8 kg (164 lb 14.5 oz)    Image Results  XR chest 1 view  Narrative: Interpreted By:  Lam Cheatham,   STUDY:  XR CHEST 1 VIEW      INDICATION:  Signs/Symptoms:post thora.      COMPARISON:  April 29      ACCESSION NUMBER(S):  [id number]      ORDERING CLINICIAN:  BEVERLY CORONA      FINDINGS:  Right-sided pleural effusion with basilar airspace opacity and mild  basilar edema slightly worsened from prior study.      No evidence of pneumothorax.      Impression:     Right-sided pleural effusion with basilar airspace opacity and mild  basilar edema slightly worsened from prior study.      No evidence of pneumothorax.      Signed by: Lam Cheatham 5/19/2024 10:40 AM  Dictation workstation:   [id number]      Physical Exam  Constitutional:       Appearance: Normal appearance.   Cardiovascular:      Pulses: Normal pulses.      Heart sounds: Normal heart sounds.   Pulmonary:      Effort: Pulmonary effort is normal.      Breath sounds: Normal breath sounds.   Abdominal:      Palpations: Abdomen is soft.   Skin:     General: Skin is warm and dry.   Neurological:      General: No focal deficit present.      Mental Status: She is alert and oriented to person, place, and time.   Psychiatric:         Mood and Affect: Mood normal.          Behavior: Behavior normal.         Relevant Results  Results for orders placed or performed during the hospital encounter of 05/15/24 (from the past 24 hour(s))   Renal function panel   Result Value Ref Range    Glucose 132 (H) 74 - 99 mg/dL    Sodium 136 136 - 145 mmol/L    Potassium 3.9 3.5 - 5.3 mmol/L    Chloride 100 98 - 107 mmol/L    Bicarbonate 31 21 - 32 mmol/L    Anion Gap 9 (L) 10 - 20 mmol/L    Urea Nitrogen 18 6 - 23 mg/dL    Creatinine 2.07 (H) 0.50 - 1.05 mg/dL    eGFR 25 (L) >60 mL/min/1.73m*2    Calcium 7.5 (L) 8.6 - 10.3 mg/dL    Phosphorus 3.3 2.5 - 4.9 mg/dL    Albumin 2.6 (L) 3.4 - 5.0 g/dL   POCT GLUCOSE   Result Value Ref Range    POCT Glucose 110 (H) 74 - 99 mg/dL   POCT GLUCOSE   Result Value Ref Range    POCT Glucose 179 (H) 74 - 99 mg/dL   CBC   Result Value Ref Range    WBC 8.1 4.4 - 11.3 x10*3/uL    nRBC 0.0 0.0 - 0.0 /100 WBCs    RBC 3.41 (L) 4.00 - 5.20 x10*6/uL    Hemoglobin 9.5 (L) 12.0 - 16.0 g/dL    Hematocrit 34.3 (L) 36.0 - 46.0 %     (H) 80 - 100 fL    MCH 27.9 26.0 - 34.0 pg    MCHC 27.7 (L) 32.0 - 36.0 g/dL    RDW 16.3 (H) 11.5 - 14.5 %    Platelets 291 150 - 450 x10*3/uL   Renal function panel   Result Value Ref Range    Glucose 145 (H) 74 - 99 mg/dL    Sodium 132 (L) 136 - 145 mmol/L    Potassium 4.2 3.5 - 5.3 mmol/L    Chloride 98 98 - 107 mmol/L    Bicarbonate 31 21 - 32 mmol/L    Anion Gap 7 (L) 10 - 20 mmol/L    Urea Nitrogen 14 6 - 23 mg/dL    Creatinine 1.91 (H) 0.50 - 1.05 mg/dL    eGFR 28 (L) >60 mL/min/1.73m*2    Calcium 7.8 (L) 8.6 - 10.3 mg/dL    Phosphorus 3.7 2.5 - 4.9 mg/dL    Albumin 2.5 (L) 3.4 - 5.0 g/dL   Magnesium   Result Value Ref Range    Magnesium 1.84 1.60 - 2.40 mg/dL   POCT GLUCOSE   Result Value Ref Range    POCT Glucose 150 (H) 74 - 99 mg/dL   POCT GLUCOSE   Result Value Ref Range    POCT Glucose 134 (H) 74 - 99 mg/dL          Assessment/Plan    Principal Problem:    Cellulitis  Active Problems:    ESRD on dialysis (Multi)    Daphne Morris  is a 69 y.o. female presenting with a PMHx of recurrent falls, cellulitis, CKD on dialysis, HTN, T2DM on insulin, anemia secondary to CKD, hypothyroidism, afib not on AC, and depression with anxiety who presents from [facility] for outpatient failure of abx for cellulitis.     #Cellulitis? Vs. Pyoderma Gangrenosum vs. Ecthyma Gangrenosum vs. Calciphylaxis vs. Other ulcerative inflammatory skin disease  -follow blood cultures  -wound care nurse consulted  -consider wound culture  -do not advise wound debridement, as it is an absolute contraindication in pyoderma gangrenosum  -consider outpatient dermatology follow up or inpatient evaluation, pt will require skin biopsy of ulcer  -pain control with tylenol PRN  -Continue vancomycin     #Recurrent Falls  #Deconditioning   -PT/OT eval    #Pleural Effusion   -Per Pulmonology, Significant right-sided pleural effusion with surrounding subsegmental atelectasis. Trace left-sided pleural effusion with surrounding atelectasis. Stable tree-in-bud infiltrates noted in the right lung could be infectious.  -Patient s/p thoracentesis 5/19 with removal of 1300 mL light yellow colored fluid       Chronic Medical Issues:   #CKD  -dialysis T, R, Saturday  -daily RFP  -Consulted Nephrology     #HTN #HLD  -continue aspirin, clopidogrel  -Continue metoprolol     #CHF  -managed with dialysis, not on diuretics     #paroxysmal afib  -continue home amiodarone  -not on anticoagulation     #T2DM on insulin  -SSI #2  -holding home lantus (10 units nightly) in setting of possible infection, may require reduced dose     #Anemia  -secondary to CKD, stable  -continue home ferrous gluconate     #Hypothyroidism  -continue home levothyroxine     #Depression/Anxiety  -continue home venlafaxine     #GERD  -continue home pantoprazole     #SUZE  -Patient normally on PRN 3L O2 at night  -on 3L during daytime in hospital currently  -Continue to wean as tolerated    F: prn  E: prn  N: diabetic diet  DVT ppx:  heparin subcutaneous  GI ppx: home pantoprazole  Code status DNR/DNI     Dispo: Patient still requiring IV antibiotic for cellulitis    Carolyn Hunter MD  PGY-1  Transitional Year Resident

## 2024-05-19 NOTE — PROGRESS NOTES
Daphne Morris is a 69 y.o. female on day 4 of admission presenting with Cellulitis.    Subjective   Interval History: no fever, no new complaints        Review of Systems    Objective   Range of Vitals (last 24 hours)  Heart Rate:  [62-64]   Temp:  [36.3 °C (97.3 °F)-36.7 °C (98.1 °F)]   Resp:  [18-22]   BP: (165-191)/(75-82)   SpO2:  [93 %-98 %]   Daily Weight  05/16/24 : 74.8 kg (164 lb 14.5 oz)    Body mass index is 26.63 kg/m².    Physical Exam  Constitutional:       Appearance: Normal appearance.   HENT:      Head: Normocephalic and atraumatic.      Mouth/Throat:      Mouth: Mucous membranes are moist.      Pharynx: Oropharynx is clear.   Eyes:      Pupils: Pupils are equal, round, and reactive to light.   Cardiovascular:      Rate and Rhythm: Normal rate and regular rhythm.      Heart sounds: Normal heart sounds.   Pulmonary:      Effort: Pulmonary effort is normal.      Breath sounds: Normal breath sounds.   Abdominal:      General: Abdomen is flat. Bowel sounds are normal.      Palpations: Abdomen is soft.   Musculoskeletal:      Cervical back: Normal range of motion.      Comments: Legs wounds, less edema and redness   Neurological:      Mental Status: She is alert.         Antibiotics  metoprolol succinate (Toprol-XL) 24 hr tablet  heparin (porcine) injection 5,000 Units  polyethylene glycol (Glycolax, Miralax) packet 17 g  glucagon (Glucagen) injection 1 mg  dextrose 50 % injection 25 g  glucagon (Glucagen) injection 1 mg  dextrose 50 % injection 12.5 g  insulin lispro (HumaLOG) injection 0-10 Units  acetaminophen (Tylenol) tablet 975 mg  albuterol 90 mcg/actuation inhaler 2 puff  venlafaxine XR (Effexor-XR) 24 hr capsule 150 mg  pantoprazole (ProtoNix) EC tablet 40 mg  metoprolol succinate XL (Toprol-XL) 24 hr tablet 50 mg  levothyroxine (Synthroid, Levoxyl) tablet 250 mcg  ferrous gluconate (Fergon) 324 (38 Fe) mg tablet 324 mg  clopidogrel (Plavix) tablet 75 mg  atorvastatin (Lipitor) tablet 40  mg  aspirin EC tablet 81 mg  amiodarone (Pacerone) tablet 200 mg  vancomycin (Vancocin) pharmacy to dose - pharmacy monitoring  vancomycin in dextrose 5 % (Vancocin) IVPB 750 mg  alteplase (Cathflo Activase) injection 2 mg  sodium chloride 0.9% flush 10 mL  sodium chloride 0.9% flush 10 mL      Relevant Results  Labs  Results from last 72 hours   Lab Units 05/19/24  0558 05/18/24  0602 05/17/24  0840   WBC AUTO x10*3/uL 8.1 9.7 8.9   HEMOGLOBIN g/dL 9.5* 9.3* 9.5*   HEMATOCRIT % 34.3* 33.5* 34.2*   PLATELETS AUTO x10*3/uL 291 328 300     Results from last 72 hours   Lab Units 05/19/24  0558 05/18/24  1439 05/18/24  0602   SODIUM mmol/L 132* 136 137   POTASSIUM mmol/L 4.2 3.9 4.0   CHLORIDE mmol/L 98 100 95*   CO2 mmol/L 31 31 30   BUN mg/dL 14 18 25*   CREATININE mg/dL 1.91* 2.07* 2.56*   GLUCOSE mg/dL 145* 132* 161*   CALCIUM mg/dL 7.8* 7.5* 7.9*   ANION GAP mmol/L 7* 9* 16   EGFR mL/min/1.73m*2 28* 25* 20*   PHOSPHORUS mg/dL 3.7 3.3 4.5     Results from last 72 hours   Lab Units 05/19/24  0558 05/18/24  1439 05/18/24  0602   ALBUMIN g/dL 2.5* 2.6* 2.7*     Estimated Creatinine Clearance: 28.3 mL/min (A) (by C-G formula based on SCr of 1.91 mg/dL (H)).  C-Reactive Protein   Date Value Ref Range Status   05/15/2024 5.41 (H) <1.00 mg/dL Final     CRP   Date Value Ref Range Status   05/06/2021 8.9 (H) 0 - 2.0 MG/DL Final     Comment:     Performed at [address] [address]   06/04/2020 2.3 (H) 0 - 2.0 MG/DL Final     Comment:     Performed at [address] [address]     Microbiology  Reviewed  Imaging  reviewed        Assessment/Plan   Legs none healing wounds / infection, rule out calciphylaxis   Pleural effusion sp thoracentesis     Recommendations :  Continue Vancomycin  Discussed with the medical team     I spent minutes in the professional and overall care of this patient.      Myke Chavarria MD

## 2024-05-19 NOTE — PROGRESS NOTES
Physical Therapy                 Therapy Communication Note    Patient Name: Daphne Morris  MRN: [id number]  Today's Date: 5/19/2024     Discipline: Physical Therapy    Missed Visit Reason: Missed Visit Reason: Other (Comment)    Missed Time: Attempt at 1124    Comment: Pt in bed, RN completing wound care to distal LE's. Pt lethargic and did not awaken, unable to safely participate in PT evaluation at this time.

## 2024-05-19 NOTE — CONSULTS
Inpatient consult to Pulmonology  Consult performed by: Delores Colby MD  Consult ordered by: Josette Damian MD        Department of Medicine  Division of Pulmonary, Critical Care, and Sleep Medicine      History Of Present Illness  Daphne Morris is a 69 y.o. female with history of ESRD on HD, hypertension, diabetes, A-fib, recurrent right pleural effusion admitted with cellulitis.  No significant respiratory symptoms during the day, reported dyspnea mainly laying down and with sleep, use 3 L O2 at night and when laying down.  Denies any productive cough, no fever or chills, no chest pain or hemoptysis.  All other systems reviewed and negative otherwise.       Past Medical History:   Diagnosis Date    Anal fissure 10/12/2023    Anemia     ASHD (arteriosclerotic heart disease)     At risk for falls     Atrial fibrillation (Multi)     CHF (congestive heart failure) (Multi)     CKD (chronic kidney disease)     COPD (chronic obstructive pulmonary disease) (Multi)     CVA (cerebral vascular accident) (Multi)     2021- right-sided weakness    Depression     Diabetes mellitus (Multi)     GERD (gastroesophageal reflux disease)     H/O pleural effusion     Hyperlipidemia     Hypertension     Hypothyroidism     Hypoxia     Impacted cerumen, left ear     Impacted cerumen of left ear    Noncompliance     Osteoarthritis     Perianal dermatitis 10/12/2023    Personal history of other diseases of the respiratory system     History of acute bronchitis    PVD (peripheral vascular disease) (CMS-HCC)     Renal carcinoma, right (Multi)     s/p partial nephrectomy 8/2021    Respiratory failure (Multi)     TIA (transient ischemic attack)     Vasculitis (CMS-HCC) 10/12/2023    Weakness        Past Surgical History:   Procedure Laterality Date    ANKLE SURGERY      2013    CARDIAC CATHETERIZATION N/A 2/7/2024    Procedure: Right Heart Cath;  Surgeon: Nicholas Antonio MD;  Location: [facility] Cardiac Cath Lab;  Service:  Cardiovascular;  Laterality: N/A;    CATARACT EXTRACTION Right     2019     SECTION, CLASSIC      MR CHEST ANGIO W AND WO IV CONTRAST  2023    MR CHEST ANGIO W AND WO IV CONTRAST 2023 [facility]    MR HEAD ANGIO WO IV CONTRAST  2021    MR HEAD ANGIO WO IV CONTRAST [facility]    NEPHRECTOMY  2021    SHOULDER SURGERY              Social History     Tobacco Use    Smoking status: Former     Current packs/day: 0.50     Average packs/day: 0.5 packs/day for 55.4 years (27.7 ttl pk-yrs)     Types: Cigarettes     Start date: 1969     Passive exposure: Never    Smokeless tobacco: Never   Vaping Use    Vaping status: Never Used   Substance Use Topics    Alcohol use: Not Currently    Drug use: Yes     Types: Marijuana       Family History   Problem Relation Name Age of Onset    Hypertension Mother      Diabetes Father      Heart disease Father      Cancer Sister      Cancer Brother      Diabetes Daughter      Asthma Daughter      Heart attack Daughter      Diabetes Maternal Grandfather      Heart disease Paternal Grandmother      Diabetes Other      Hypertension Other      COPD Other      Other (chronic lung disease) Other             Allergies  Bee venom protein (honey bee), Citalopram, Codeine, Influenza virus vaccines, Latex, Lisinopril, Adhesive tape-silicones, Amoxicillin, Doxycycline, Oseltamivir, Phenyleph-min oil-petrolatum, Phenyleph-shark oil-glyc-pet, Phenylephrine, Prednisone, Tuberculin ppd, and Xylometazoline    Review of Systems       Physical exam  Constitutional: Normal appearance.  HEENT: Normocephalic and atraumatic.  Cardiovascular: Normal rate and regular rhythm.  Pulmonary: Diminished right basilar sounds, left lung clear, no wheezing.  Musculoskeletal: No edema, no cyanosis.  Neurological: Awake, alert and oriented x3.  Psychiatric: Normal behavior, mood and affect.     Vital Signs  Visit Vitals  /75 (BP Location: Right arm, Patient Position:  "Lying)   Pulse 62   Temp 36.3 °C (97.3 °F) (Temporal)   Resp 19   Ht 1.676 m (5' 5.98\")   Wt 74.8 kg (164 lb 14.5 oz)   SpO2 98%   BMI 26.63 kg/m²   Smoking Status Former   BSA 1.87 m²      Lab Results   Component Value Date    WBC 8.1 05/19/2024    HGB 9.5 (L) 05/19/2024    HCT 34.3 (L) 05/19/2024     (H) 05/19/2024     05/19/2024      Lab Results   Component Value Date    GLUCOSE 145 (H) 05/19/2024    CALCIUM 7.8 (L) 05/19/2024     (L) 05/19/2024    K 4.2 05/19/2024    CO2 31 05/19/2024    CL 98 05/19/2024    BUN 14 05/19/2024    CREATININE 1.91 (H) 05/19/2024      Lab Results   Component Value Date    ALT 7 05/14/2024    AST 20 05/14/2024    ALKPHOS 93 05/14/2024    BILITOT 0.4 05/14/2024        Oxygen Therapy  SpO2: 98 %  Medical Gas Therapy: Supplemental oxygen  O2 Delivery Method: Nasal cannula  FiO2 (%): 28 %    Medications   Scheduled medications  amiodarone, 200 mg, oral, Daily with breakfast  aspirin, 81 mg, oral, Daily  atorvastatin, 40 mg, oral, Daily  clopidogrel, 75 mg, oral, Daily  ferrous gluconate, 324 mg, oral, Daily with breakfast  heparin (porcine), 5,000 Units, subcutaneous, q8h  heparin, 1,900 Units, intra-catheter, After Dialysis  heparin, 1,900 Units, intra-catheter, After Dialysis  insulin lispro, 0-10 Units, subcutaneous, TID  ipratropium-albuteroL, 3 mL, nebulization, q6h while awake  levothyroxine, 250 mcg, oral, Daily before breakfast  metoprolol succinate XL, 50 mg, oral, Daily  mupirocin, , Topical, TID  pantoprazole, 40 mg, oral, BID  polyethylene glycol, 17 g, oral, Daily  vancomycin, 750 mg, intravenous, Once per day on Tuesday Thursday Saturday  venlafaxine XR, 150 mg, oral, Daily      Continuous medications     PRN medications  PRN medications: acetaminophen, albuterol, albuterol, alteplase, dextrose, dextrose, glucagon, glucagon, oxygen, sodium chloride 0.9%, sodium chloride 0.9%, vancomycin     Chest Radiograph   CT angio chest for pulmonary embolism " 05/18/2024    Narrative  Interpreted By:  Baljinder Lopez,  STUDY:  CT ANGIO CHEST FOR PULMONARY EMBOLISM;  5/18/2024 9:54 am    INDICATION:  Signs/Symptoms:PE r/o.    COMPARISON:  Chest CT scan 02/01/2024.    ACCESSION NUMBER(S):  [id number]    ORDERING CLINICIAN:  DULCE CHENEY    TECHNIQUE:  Helical data acquisition of the chest was obtained after intravenous  administration of 75 ML Omnipaque 350, as per PE protocol. Images  were reformatted in coronal and sagittal planes. Axial and coronal  maximum intensity projection (MIP) images were created and reviewed.    FINDINGS:  POTENTIAL LIMITATIONS OF THE STUDY: None    HEART AND VESSELS:  There are no discrete filling defects within main pulmonary artery  and its branches to suggest acute pulmonary embolism. Main pulmonary  artery and its branches are normal in caliber.    The thoracic aorta normal in course and caliber.  No coronary artery calcifications are seen. Please note, the study is  not optimized for evaluation of coronary arteries. Right central  venous catheter terminating in the cavoatrial junction.    Heart:  Mild cardiomegaly. Trace pericardial effusion. Left atrial appendage  closure device with evidence of small thrombus.    MEDIASTINUM AND TAMMIE, LOWER NECK AND AXILLA:  Peripherally calcific left thyroid nodule measuring 1.1 cm.  No evidence of thoracic lymphadenopathy by CT criteria.  Esophagus appears within normal limits as seen.    LUNGS AND AIRWAYS:  Moderate right-sided pleural effusion with improved previously seen  subsegmental atelectasis involving the right lower lobe and right  middle lobe. Trace left-sided pleural effusion with surrounding  atelectasis. Stable scattered tree-in-bud infiltrates in the right  lung.    UPPER ABDOMEN:  The visualized subdiaphragmatic structures demonstrate no remarkable  findings.    CHEST WALL AND OSSEOUS STRUCTURES:  Chest wall is within normal limits.  No acute osseous pathology.There are no suspicious  "osseous lesions.    Impression  1. No major central or segmental pulmonary embolism.  2. Significant right-sided pleural effusion with surrounding  subsegmental atelectasis. Trace left-sided pleural effusion with  surrounding atelectasis. Stable tree-in-bud infiltrates noted in the  right lung be infectious.  3. Mild cardiomegaly. Trace pericardial effusion. Left atrial  appendage closure device with evidence of small thrombus.  4. Peripherally calcific left thyroid nodule measuring 1.1 cm as in  prior. This could be further assessed by dedicated nonemergent  thyroid ultrasound if clinically desired.    MACRO:  None    Signed by: Baljinder Lopez 5/18/2024 12:01 PM  Dictation workstation:   [id number]      No results found for this or any previous visit from the past 10 days.         Pulmonary Function Tests   Pulmonary Functions Testing Results:  No results found for: \"FEV1\", \"FVC\", \"SUR2OPL\", \"TLC\", \"DLCO\"         Assessment and Plan / Recommendations   Assessment/Plan   69 y.o. female with history of ESRD on HD, hypertension, diabetes, A-fib, recurrent right pleural effusion admitted with cellulitis    1.  Recurrent large right pleural effusion with orthopnea and hypoxia when laying down.  Had thoracentesis in February and fluid was transudative likely due to cardiac/renal.  Thoracentesis done today, 1300 mL removed.   Pending chest x-ray, if no pneumothorax patient can be discharged from pulmonary standpoint.  Follow-up outpatient, if fluid recur and symptomatic will need evaluation for pleurodesis/Pleurx.           Delores Colby MD    "

## 2024-05-19 NOTE — PROCEDURES
A time out was performed, pleural space was examined with US and appropriate side/entry point was confirmed and marked -right posterior midline.  The patient was prepped and draped in a sterile manner, 1% lidocaine was used to anesthesize the skin and subcutaneous tissue. Thoracentesis needle was then introduced through the skin incision into the pleural space, the thoracentesis catheter was then threaded over the needle  without difficulty.  1300 mL of light yellow color  fluid was removed without difficulty. The catheter was then removed. No immediate complications were noted during the procedure. A post-procedure chest x-ray pending.

## 2024-05-19 NOTE — NURSING NOTE
Dressings changed per order. Patient states that wounds are much less painful and did not require pain medication for dressing change. Patient is refusing Tru[employer]u boots at this time but she said that she will wear them at night. Patient educated about the importance of wearing boots to relief pressure. Bedside nurse Mango wilson.

## 2024-05-19 NOTE — PROGRESS NOTES
Occupational Therapy                 Therapy Communication Note    Patient Name: Daphne Morris  MRN: [id number]  Today's Date: 5/19/2024     Discipline: Occupational Therapy    Missed Visit Reason: Missed Visit Reason: Other (Comment) (Pt lethargic and did not awaken, unable to safely participate in OT evaluation at this time.)    Missed Time: Attempt    Time: 12:19

## 2024-05-20 ENCOUNTER — APPOINTMENT (OUTPATIENT)
Dept: PRIMARY CARE | Facility: CLINIC | Age: 69
End: 2024-05-20
Payer: MEDICARE

## 2024-05-20 VITALS
BODY MASS INDEX: 26.5 KG/M2 | TEMPERATURE: 96.6 F | SYSTOLIC BLOOD PRESSURE: 165 MMHG | WEIGHT: 164.9 LBS | HEIGHT: 66 IN | RESPIRATION RATE: 19 BRPM | HEART RATE: 61 BPM | OXYGEN SATURATION: 98 % | DIASTOLIC BLOOD PRESSURE: 74 MMHG

## 2024-05-20 PROBLEM — I63.9 CEREBROVASCULAR ACCIDENT (CVA) (MULTI): Status: ACTIVE | Noted: 2022-11-16

## 2024-05-20 PROBLEM — D50.0 IRON DEFICIENCY ANEMIA DUE TO CHRONIC BLOOD LOSS: Status: ACTIVE | Noted: 2023-05-11

## 2024-05-20 LAB
ALBUMIN SERPL BCP-MCNC: 2.5 G/DL (ref 3.4–5)
ANION GAP SERPL CALC-SCNC: 12 MMOL/L (ref 10–20)
BUN SERPL-MCNC: 29 MG/DL (ref 6–23)
CALCIUM SERPL-MCNC: 7.9 MG/DL (ref 8.6–10.3)
CHLORIDE SERPL-SCNC: 100 MMOL/L (ref 98–107)
CO2 SERPL-SCNC: 29 MMOL/L (ref 21–32)
CREAT SERPL-MCNC: 2.97 MG/DL (ref 0.5–1.05)
EGFRCR SERPLBLD CKD-EPI 2021: 16 ML/MIN/1.73M*2
ERYTHROCYTE [DISTWIDTH] IN BLOOD BY AUTOMATED COUNT: 16.6 % (ref 11.5–14.5)
GLUCOSE BLD MANUAL STRIP-MCNC: 206 MG/DL (ref 74–99)
GLUCOSE BLD MANUAL STRIP-MCNC: 225 MG/DL (ref 74–99)
GLUCOSE SERPL-MCNC: 217 MG/DL (ref 74–99)
HCT VFR BLD AUTO: 32.4 % (ref 36–46)
HGB BLD-MCNC: 9.2 G/DL (ref 12–16)
MAGNESIUM SERPL-MCNC: 1.96 MG/DL (ref 1.6–2.4)
MCH RBC QN AUTO: 28 PG (ref 26–34)
MCHC RBC AUTO-ENTMCNC: 28.4 G/DL (ref 32–36)
MCV RBC AUTO: 99 FL (ref 80–100)
NRBC BLD-RTO: 0.2 /100 WBCS (ref 0–0)
PHOSPHATE SERPL-MCNC: 4.5 MG/DL (ref 2.5–4.9)
PLATELET # BLD AUTO: 333 X10*3/UL (ref 150–450)
POTASSIUM SERPL-SCNC: 4.8 MMOL/L (ref 3.5–5.3)
RBC # BLD AUTO: 3.28 X10*6/UL (ref 4–5.2)
SODIUM SERPL-SCNC: 136 MMOL/L (ref 136–145)
WBC # BLD AUTO: 9.6 X10*3/UL (ref 4.4–11.3)

## 2024-05-20 PROCEDURE — 2500000002 HC RX 250 W HCPCS SELF ADMINISTERED DRUGS (ALT 637 FOR MEDICARE OP, ALT 636 FOR OP/ED): Performed by: INTERNAL MEDICINE

## 2024-05-20 PROCEDURE — 99232 SBSQ HOSP IP/OBS MODERATE 35: CPT

## 2024-05-20 PROCEDURE — 80069 RENAL FUNCTION PANEL: CPT

## 2024-05-20 PROCEDURE — 1090000002 HH PPS REVENUE DEBIT

## 2024-05-20 PROCEDURE — 99239 HOSP IP/OBS DSCHRG MGMT >30: CPT

## 2024-05-20 PROCEDURE — 85027 COMPLETE CBC AUTOMATED: CPT

## 2024-05-20 PROCEDURE — 83735 ASSAY OF MAGNESIUM: CPT

## 2024-05-20 PROCEDURE — 82947 ASSAY GLUCOSE BLOOD QUANT: CPT

## 2024-05-20 PROCEDURE — 97161 PT EVAL LOW COMPLEX 20 MIN: CPT | Mod: GP

## 2024-05-20 PROCEDURE — 94640 AIRWAY INHALATION TREATMENT: CPT

## 2024-05-20 PROCEDURE — 1090000001 HH PPS REVENUE CREDIT

## 2024-05-20 PROCEDURE — 2500000006 HC RX 250 W HCPCS SELF ADMINISTERED DRUGS (ALT 637 FOR ALL PAYERS)

## 2024-05-20 PROCEDURE — 2500000004 HC RX 250 GENERAL PHARMACY W/ HCPCS (ALT 636 FOR OP/ED)

## 2024-05-20 PROCEDURE — 36416 COLLJ CAPILLARY BLOOD SPEC: CPT

## 2024-05-20 PROCEDURE — 2500000001 HC RX 250 WO HCPCS SELF ADMINISTERED DRUGS (ALT 637 FOR MEDICARE OP)

## 2024-05-20 RX ORDER — VANCOMYCIN HYDROCHLORIDE 750 MG/150ML
0.75 INJECTION, SOLUTION INTRAVENOUS ONCE
Qty: 150 ML | Refills: 0 | Status: SHIPPED | OUTPATIENT
Start: 2024-05-21 | End: 2024-05-21

## 2024-05-20 RX ADMIN — MUPIROCIN: 20 OINTMENT TOPICAL at 08:07

## 2024-05-20 RX ADMIN — FERROUS GLUCONATE 324 MG: 324 TABLET ORAL at 08:08

## 2024-05-20 RX ADMIN — INSULIN LISPRO 4 UNITS: 100 INJECTION, SOLUTION INTRAVENOUS; SUBCUTANEOUS at 11:27

## 2024-05-20 RX ADMIN — HEPARIN SODIUM 5000 UNITS: 5000 INJECTION INTRAVENOUS; SUBCUTANEOUS at 14:19

## 2024-05-20 RX ADMIN — PANTOPRAZOLE SODIUM 40 MG: 40 TABLET, DELAYED RELEASE ORAL at 08:08

## 2024-05-20 RX ADMIN — ATORVASTATIN CALCIUM 40 MG: 40 TABLET, FILM COATED ORAL at 08:08

## 2024-05-20 RX ADMIN — CLOPIDOGREL 75 MG: 75 TABLET ORAL at 08:08

## 2024-05-20 RX ADMIN — IPRATROPIUM BROMIDE AND ALBUTEROL SULFATE 3 ML: 2.5; .5 SOLUTION RESPIRATORY (INHALATION) at 08:12

## 2024-05-20 RX ADMIN — METOPROLOL SUCCINATE 50 MG: 50 TABLET, EXTENDED RELEASE ORAL at 08:08

## 2024-05-20 RX ADMIN — LEVOTHYROXINE SODIUM 250 MCG: 100 TABLET ORAL at 06:12

## 2024-05-20 RX ADMIN — INSULIN LISPRO 4 UNITS: 100 INJECTION, SOLUTION INTRAVENOUS; SUBCUTANEOUS at 08:04

## 2024-05-20 RX ADMIN — VENLAFAXINE HYDROCHLORIDE 150 MG: 150 CAPSULE, EXTENDED RELEASE ORAL at 08:08

## 2024-05-20 RX ADMIN — ASPIRIN 81 MG: 81 TABLET, COATED ORAL at 08:08

## 2024-05-20 RX ADMIN — AMIODARONE HYDROCHLORIDE 200 MG: 200 TABLET ORAL at 08:08

## 2024-05-20 RX ADMIN — HEPARIN SODIUM 5000 UNITS: 5000 INJECTION INTRAVENOUS; SUBCUTANEOUS at 06:12

## 2024-05-20 RX ADMIN — MUPIROCIN: 20 OINTMENT TOPICAL at 14:20

## 2024-05-20 ASSESSMENT — PAIN - FUNCTIONAL ASSESSMENT
PAIN_FUNCTIONAL_ASSESSMENT: 0-10
PAIN_FUNCTIONAL_ASSESSMENT: 0-10

## 2024-05-20 ASSESSMENT — ACTIVITIES OF DAILY LIVING (ADL): OASIS_M1830: 05

## 2024-05-20 ASSESSMENT — COGNITIVE AND FUNCTIONAL STATUS - GENERAL
MOBILITY SCORE: 16
STANDING UP FROM CHAIR USING ARMS: A LITTLE
MOVING TO AND FROM BED TO CHAIR: A LITTLE
MOVING FROM LYING ON BACK TO SITTING ON SIDE OF FLAT BED WITH BEDRAILS: A LITTLE
TURNING FROM BACK TO SIDE WHILE IN FLAT BAD: A LITTLE
CLIMB 3 TO 5 STEPS WITH RAILING: A LOT
WALKING IN HOSPITAL ROOM: A LOT

## 2024-05-20 ASSESSMENT — PAIN SCALES - GENERAL
PAINLEVEL_OUTOF10: 0 - NO PAIN
PAINLEVEL_OUTOF10: 0 - NO PAIN

## 2024-05-20 NOTE — DISCHARGE INSTRUCTIONS
You were seen in the hospital for your wounds and were treated with antibiotics. You were also found to have fluid in your lungs, which was drained. You were also found to have an incidental thyroid nodule on CT scan, which can be followed up outpatient. You are now medically ready to be discharged home.     After you leave the hospital, you will receive 1 more dose of vancomycin at dialysis on 5/21/24. We are giving you a paper prescription that will be faxed to the dialysis center.     After discharge, you will receive Home Health Services, where they will come and dress your wounds and work with you for physical and occupational therapy.     Due to your lung fluid, please follow up with Dr. Colby, the pulmonologist who drained your lungs. Please also follow up with your Primary Care Provider and Endocrinology for hospital follow up and for evaluation of your incidental thyroid nodule. Follow-up with Dermatology for evaluation of multiple wounds.  Follow-up with the wound care clinic.  Please call and schedule an appointment with these providers in 1 week.

## 2024-05-20 NOTE — DISCHARGE SUMMARY
"Discharge Diagnosis  Cellulitis    Issues Requiring Follow-Up  Hospital Follow Up--Follow up with PCP  Incidental Thyroid Nodule on CT--Follow up with PCP  Pleural Effusion--Follow up with Pulmonology   Wound Care and PT/OT  Vancomycin dose after dialysis     Discharge Meds     Your medication list        START taking these medications        Instructions Last Dose Given Next Dose Due   vancomycin in dextrose 5 % IVPB  Commonly known as: Vancocin  Start taking on: May 21, 2024      Infuse 150 mL (0.75 g) at 200 mL/hr over 45 minutes into a venous catheter 1 time for 1 dose. Do not fill before May 21, 2024.              CONTINUE taking these medications        Instructions Last Dose Given Next Dose Due   acetaminophen 500 mg tablet  Commonly known as: Tylenol           albuterol 90 mcg/actuation inhaler           amiodarone 200 mg tablet  Commonly known as: Pacerone           aspirin 81 mg EC tablet           atorvastatin 40 mg tablet  Commonly known as: Lipitor           BD Calista 2nd Gen Pen Needle 32 gauge x 5/32\" needle  Generic drug: pen needle, diabetic           clopidogrel 75 mg tablet  Commonly known as: Plavix           ferrous gluconate 324 (38 Fe) mg tablet  Commonly known as: Fergon      Take 1 tablet (324 mg) by mouth once daily with breakfast.       FreeStyle Shakir 14 Day Sensor kit  Generic drug: flash glucose sensor kit           FreeStyle Shakir 3 [place] misc  Generic drug: blood-glucose meter,continuous      Use as instructed       FreeStyle Shakir 3 Sensor device  Generic drug: blood-glucose sensor      Change sensor Q 14 days       HumaLOG KwikPen Insulin 100 unit/mL injection  Generic drug: insulin lispro      up to 15 units Subcutaneous three times a day per sliding scaleup to 15 units Subcutaneous three times a day per sliding scale       Lantus Solostar U-100 Insulin 100 unit/mL (3 mL) pen  Generic drug: insulin glargine      Inject 10 Units under the skin once daily at bedtime.     "   levothyroxine 200 mcg tablet  Commonly known as: Synthroid, Levoxyl      Take 1 tablet (200 mcg) by mouth once daily in the morning. Take before meals.       levothyroxine 50 mcg tablet  Commonly known as: Synthroid, Levoxyl      Take 1 tablet (50 mcg) by mouth once daily in the morning. Take before meals.       metoprolol succinate XL 50 mg 24 hr tablet  Commonly known as: Toprol-XL           oxygen DME/Hospice therapy  Commonly known as: O2           pantoprazole 40 mg EC tablet  Commonly known as: ProtoNix      Take 1 tablet (40 mg) by mouth 2 times a day.       venlafaxine XR 75 mg 24 hr capsule  Commonly known as: Effexor-XR      Take 2 capsules (150 mg) by mouth once daily.              STOP taking these medications      cephalexin 500 mg capsule  Commonly known as: Keflex                  Where to Get Your Medications        You can get these medications from any pharmacy    Bring a paper prescription for each of these medications  vancomycin in dextrose 5 % IVPB         Test Results Pending At Discharge  Pending Labs       No current pending labs.            Hospital Course  History Of Present Illness  Daphne Morris is a 69 y.o. female presenting with a PMHx of recurrent falls, cellulitis, CKD on dialysis, HTN, T2DM on insulin, anemia secondary to CKD, hypothyroidism, afib not on AC, and depression with anxiety who presents from [facility] for outpatient failure of abx for cellulitis. Pt states she has had recurrent episodes of trauma to the arms and legs causing ulcerative wounds that are incredibly painful. She was discharged home from the hospital 5/2 with a course of oral cephalexin and seen by home healthcare. She went to dialysis today and the dialysis nurses told her to go to the ED due to erythema of the bilateral legs and her chronic leg ulcers. Pt states worsening of the lower leg ulcers on [birth year] after she tripped using her walker while out at a [employer] (mechanical fall).  She reports no systemic systems including f/c, n/v, abdominal pain, syncope, malaise. She gets dialysis Tuesdays, Thursdays, Saturdays. Has not missed session recently.     Reviewing medical chart, she has been treated for recurrent cellulitis with media images of many ulcers present in the EMR. She has never had fever or elevated WBC. She states she has pain with her ulcers, but has not noticed swelling or spreading erythema. Denies legs are warm to the touch. A wound culture was obtained in April (unclear source) and showed MRSA with VRE.     ED course: Xray of tibia and fibula unremarkable except for soft tissue ulcer. CT C spine and head clear.  CMP with glucose 166, Cr 1.51, Ca 7.8, albumin 2.7, lactate normal, blood culture x2 obtained  CBC with Hb of 9.8  Treated with Vanc, Ceftriaxone    Floor Course: Continued Vancomycin. Suspected Pyoderma Gangrenosum vs. Calciphylaxis. ESR, CRP elevated. Consulted ID and Nephrology. Plan for HD 5/16. Currently on 2 L O2 NC,patient on RA at baseline. Continue to wean. Continue Vancomycin and HD. Overnight 5/16-5/17,  patient had increased work of breathing around 3 am. Patient was given albuterol, EKG and D-Dimer were obtained, and patient was put on 6L O2 with improvement. D-dimer was over 3,000. Heparin drip was started, as was V/Q scan which showed intermediate risk of PE. VBG showed pH 7.25, CO2 70, Bicarb 30.  This morning (5/17), patient denied SOB, currently on 4 L O2 NC. V/Q scan showed intermediate risk of PE. Will obtain CT PE after discussion with Nephrology due to patient's scheduled HD. Ordered US of lower extremities. Ordered repeat VBG, can consider Bipap based on results. Continue Vancomycin. As of 5/18, PE and DVT workup negative, discontinued heparin drip. Found to have significant pleural effusion on CT Angio; significant right-sided pleural effusion with surrounding subsegmental atelectasis; trace left-sided pleural effusion with surrounding  atelectasis; stable tree-in-bud infiltrates noted in the right lung could be infectious. Incentive spirometer. Consulted Pulmonology. As of 5/19, patient had thoracentesis with removal of 1300 mL light yellow colored fluid. Continued vancomycin. Ordered PT/OT eval for recurrent falls/deconditioning. Patient currently on 3 L O2, continue to wean as tolerated. As of 5/20, patient is on RA. Patient refusing SNF, is medically ready for discharge. Needs one more dose of vancomycin after dialysis 5/21.     Pertinent Physical Exam At Time of Discharge  Physical Exam  Constitutional:       Appearance: Normal appearance.   HENT:      Head: Normocephalic and atraumatic.   Cardiovascular:      Rate and Rhythm: Normal rate and regular rhythm.   Pulmonary:      Effort: Pulmonary effort is normal. No respiratory distress.      Breath sounds: Normal breath sounds.   Neurological:      General: No focal deficit present.      Mental Status: She is alert and oriented to person, place, and time.   Psychiatric:         Mood and Affect: Mood normal.         Behavior: Behavior normal.         Outpatient Follow-Up  Future Appointments   Date Time Provider Department [place]   6/10/2024  1:30 PM Jamee Haddad MD [id number]         Carolyn Hunter MD  PGY-1  Transitional Year Resident

## 2024-05-20 NOTE — PROGRESS NOTES
Physical Therapy    Physical Therapy Evaluation    Patient Name: Daphne Morris  MRN: [id number]  Today's Date: 5/20/2024   Time Calculation  Start Time: 1246  Stop Time: 1304  Time Calculation (min): 18 min    Assessment/Plan   PT Assessment  PT Assessment Results: Decreased strength, Decreased mobility, Decreased safety awareness (deconditioning)  Rehab Prognosis: Good  Evaluation/Treatment Tolerance: Patient limited by fatigue  Medical Staff Made Aware: Yes  Strengths: Ability to acquire knowledge  Barriers to Participation: Comorbidities  End of Session Communication: Charge Nurse  End of Session Patient Position: Up in chair, Alarm on  IP OR SWING BED PT PLAN  Inpatient or Swing Bed: Inpatient  PT Plan  Treatment/Interventions: Bed mobility, Transfer training, Gait training, Strengthening, Therapeutic exercise  PT Plan: Skilled PT  PT Frequency: 3 times per week  PT Discharge Recommendations: Moderate intensity level of continued care  Equipment Recommended upon Discharge: Wheeled walker  PT Recommended Transfer Status: Assist x1  PT - OK to Discharge: Yes (Per PT POC)      Subjective   General Visit Information:  General  Reason for Referral:  (68 yo female admitted 2' to outpatient failure, cellulitis B LE)  Referred By:  (Dr. HILARIO Hunter)  Past Medical History Relevant to Rehab:  (recurent fals, cellulitis, CKD, HD (T, Th, Sa), HTN, T2 DM (Insulin))  Prior to Session Communication: Bedside nurse  Patient Position Received: Up in chair, Alarm on  Preferred Learning Style: verbal  General Comment:  (Pt is pleasant and agreeable to work with PTKen Danielle)  Home Living:  Home Living  Type of Home: Mobile home  Lives With: Alone (Pt states that her nephew and neighbor stay with her all the time)  Home Adaptive Equipment: Walker rolling or standard, Cane, Wheelchair-manual, Crutches (rollator)  Home Layout: One level  Home Access: Stairs to enter with rails  Entrance Stairs-Rails:  (1 hand rail)  Entrance Stairs-Number  of Steps:  (5)  Bathroom Shower/Tub: Walk-in shower  Bathroom Equipment: Grab bars in shower, Shower chair with back, Raised toilet seat without rails  Prior Level of Function:  Prior Function Per Pt/Caregiver Report  Ambulatory Assistance: Independent  Precautions:     Vital Signs:       Objective   Pain:  Pain Assessment  Pain Assessment: 0-10  Pain Score: 0 - No pain  Cognition:  Cognition  Overall Cognitive Status: Within Functional Limits    General Assessments:  General Observation  General Observation:  (Pt is moving cautiously needing v/cues to stay closer to her rollator walker. Based on her current status, recommend Pt have MODERATE follow up services)               Activity Tolerance  Endurance: Tolerates 10 - 20 min exercise with multiple rests         Strength  Strength Comments:  (B UE and LE are grossly 4/4+ of 5)  Static Sitting Balance  Static Sitting-Balance Support: Bilateral upper extremity supported, Feet supported  Static Sitting-Level of Assistance: Independent    Static Standing Balance  Static Standing-Balance Support: Bilateral upper extremity supported (Rollator walker)  Static Standing-Level of Assistance: Close supervision, Contact guard  Dynamic Standing Balance  Dynamic Standing-Balance Support: Bilateral upper extremity supported (Rollator walker)  Dynamic Standing-Comments:  (Minimum assist)  Functional Assessments:  Bed Mobility  Bed Mobility: No    Transfers  Transfer: Yes  Transfer 1  Transfer From 1: Chair with arms to  Transfer to 1: Stand  Technique 1: Sit to stand, Stand to sit  Transfer Device 1:  (Rollator walker)  Transfer Level of Assistance 1: Minimum assistance, Moderate assistance (x 1)    Ambulation/Gait Training  Ambulation/Gait Training Performed: Yes  Ambulation/Gait Training 1  Surface 1: Level tile  Device 1: Rolling walker  Assistance 1: Minimum assistance (x 1)  Quality of Gait 1: Decreased step length (Decreased lolis)  Comments/Distance (ft) 1:  (12  feet)    Stairs  Stairs: No  Extremity/Trunk Assessments:  RUE   RUE :  (Shoulder limited to 100 degrees, otherwise R UE is WFL)  LUE   LUE:  (Shoulder limited to 100 degrees, otherwise L UE is WFL)  RLE   RLE : Within Functional Limits  LLE   LLE : Within Functional Limits  Outcome Measures:  [facility] Basic Mobility  Turning from your back to your side while in a flat bed without using bedrails: A little  Moving from lying on your back to sitting on the side of a flat bed without using bedrails: A little  Moving to and from bed to chair (including a wheelchair): A little  Standing up from a chair using your arms (e.g. wheelchair or bedside chair): A little  To walk in hospital room: A lot  Climbing 3-5 steps with railing: A lot  Basic Mobility - Total Score: 16    Encounter Problems       Encounter Problems (Active)       Mobility       STG - Patient will ambulate 50-80 feet MOD I with wheeled walker (Progressing)       Start:  05/20/24    Expected End:  06/03/24            STG - Patient will ascend and descend four to six stairs MOD I with 1 rail (Progressing)       Start:  05/20/24    Expected End:  06/03/24               PT Transfers       STG - Patient to transfer to and from sit to supine independently (Progressing)       Start:  05/20/24    Expected End:  06/03/24            STG - Patient will transfer sit to and from stand MOD I with wheeled walker (Progressing)       Start:  05/20/24    Expected End:  06/03/24               Pain - Adult              Education Documentation  No documentation found.  Education Comments  No comments found.

## 2024-05-20 NOTE — TELEPHONE ENCOUNTER
Notified via phone by Ashish Tinsley RN/  at [facility] that patient will be discharging home today with [facility]. House Calls visit scheduled with Lilly High NP 5/29/24 at 10:00 am.

## 2024-05-20 NOTE — PROGRESS NOTES
NEPHROLOGY NEW CONSULT NOTE     Patient ID: Daphne Morris is a 69 y.o. female.     Reason for consult: ESRD-HD    No chief complaint on file.     HPI  Daphne Morris is a 69 y.o. female Daphne Morris is a 69 y.o. female on day 5 of admission presenting with Cellulitis.  - With past medical Hx as below recurrent falls, cellulitis, CKD on dialysis, HTN, T2DM on insulin, anemia secondary to CKD, hypothyroidism, afib not on AC, and depression with anxiety   - Admitted for outpatient failure of abx for cellulitis.   - Nephrology was consulted for ESRD management     Past Medical History:   Diagnosis Date    Anal fissure 10/12/2023    Anemia     ASHD (arteriosclerotic heart disease)     At risk for falls     Atrial fibrillation (Multi)     CHF (congestive heart failure) (Multi)     CKD (chronic kidney disease)     COPD (chronic obstructive pulmonary disease) (Multi)     CVA (cerebral vascular accident) (Multi)     - right-sided weakness    Depression     Diabetes mellitus (Multi)     GERD (gastroesophageal reflux disease)     H/O pleural effusion     Hyperlipidemia     Hypertension     Hypothyroidism     Hypoxia     Impacted cerumen, left ear     Impacted cerumen of left ear    Noncompliance     Osteoarthritis     Perianal dermatitis 10/12/2023    Personal history of other diseases of the respiratory system     History of acute bronchitis    PVD (peripheral vascular disease) (CMS-HCC)     Renal carcinoma, right (Multi)     s/p partial nephrectomy 2021    Respiratory failure (Multi)     TIA (transient ischemic attack)     Vasculitis (CMS-HCC) 10/12/2023    Weakness       Past Surgical History:   Procedure Laterality Date    ANKLE SURGERY          CARDIAC CATHETERIZATION N/A 2024    Procedure: Right Heart Cath;  Surgeon: Nicholas Antonio MD;  Location: [facility] Cardiac Cath Lab;  Service: Cardiovascular;  Laterality: N/A;    CATARACT EXTRACTION Right     2019     SECTION, CLASSIC      MR CHEST  ANGIO W AND WO IV CONTRAST  08/22/2023    MR CHEST ANGIO W AND WO IV CONTRAST 8/22/2023 [facility]    MR HEAD ANGIO WO IV CONTRAST  04/06/2021    MR HEAD ANGIO WO IV CONTRAST [facility]    NEPHRECTOMY  08/04/2021    SHOULDER SURGERY      2009      Family History   Problem Relation Name Age of Onset    Hypertension Mother      Diabetes Father      Heart disease Father      Cancer Sister      Cancer Brother      Diabetes Daughter      Asthma Daughter      Heart attack Daughter      Diabetes Maternal Grandfather      Heart disease Paternal Grandmother      Diabetes Other      Hypertension Other      COPD Other      Other (chronic lung disease) Other       Social History     Socioeconomic History    Marital status:      Spouse name: Not on file    Number of children: Not on file    Years of education: Not on file    Highest education level: Not on file   Occupational History    Not on file   Tobacco Use    Smoking status: Former     Current packs/day: 0.50     Average packs/day: 0.5 packs/day for 55.4 years (27.7 ttl pk-yrs)     Types: Cigarettes     Start date: 1/1/1969     Passive exposure: Never    Smokeless tobacco: Never   Vaping Use    Vaping status: Never Used   Substance and Sexual Activity    Alcohol use: Not Currently    Drug use: Yes     Types: Marijuana    Sexual activity: Not on file   Other Topics Concern    Not on file   Social History Narrative    Not on file     Social Determinants of Health     Financial Resource Strain: Low Risk  (5/15/2024)    Overall Financial Resource Strain (CARDIA)     Difficulty of Paying Living Expenses: Not hard at all   Food Insecurity: Not on File (11/28/2023)    Received from [facility]     Food Insecurity     Food: 0   Transportation Needs: No Transportation Needs (5/15/2024)    PRAPARE - Transportation     Lack of Transportation (Medical): No     Lack of Transportation (Non-Medical): No   Physical Activity: Inactive (4/30/2024)    Exercise Vital Sign      Days of Exercise per Week: 0 days     Minutes of Exercise per Session: 0 min   Stress: No Stress Concern Present (4/30/2024)    [employer] [place] [employer] - Occupational Stress Questionnaire     Feeling of Stress : Not at all   Recent Concern: Stress - Stress Concern Present (3/15/2024)    [employer] [place] [employer] - Occupational Stress Questionnaire     Feeling of Stress : To some extent   Social Connections: Feeling Somewhat Isolated (5/6/2024)    OASIS : Social Isolation     Frequency of experiencing loneliness or isolation: Sometimes   Intimate Partner Violence: Not At Risk (4/30/2024)    Humiliation, Afraid, Rape, and Kick questionnaire     Fear of Current or Ex-Partner: No     Emotionally Abused: No     Physically Abused: No     Sexually Abused: No   Housing Stability: Low Risk  (5/15/2024)    Housing Stability Vital Sign     Unable to Pay for Housing in the Last Year: No     Number of Places Lived in the Last Year: 1     Unstable Housing in the Last Year: No     Allergies   Allergen Reactions    Bee Venom Protein (Honey Bee) Swelling    Citalopram Hives, Other, Rash and Nausea/vomiting     Facial breakout, blisters, and rash    Codeine Headache, Other and Itching     Migraines    Influenza Virus Vaccines Hives    Latex Other     blisters    Latex, Natural Rubber Other     blisters    Lisinopril Swelling and Nausea/vomiting     Facial swelling    Penicillins Swelling, Headache and Unknown    Adhesive Tape-Silicones Other     blisters    Amoxicillin Swelling and Rash    Doxycycline Rash and Unknown    Oseltamivir Rash and Nausea/vomiting    Phenyleph-Min Oil-Petrolatum Other    Phenyleph-Shark Oil-Glyc-Pet Rash    Phenylephrine Nausea/vomiting    Prednisone Other and Nausea/vomiting     Yeast infection    Tuberculin Ppd Unknown    Xylometazoline Unknown        Scheduled medications  amiodarone, 200 mg, oral, Daily with breakfast  aspirin, 81 mg, oral, Daily  atorvastatin, 40  mg, oral, Daily  clopidogrel, 75 mg, oral, Daily  ferrous gluconate, 324 mg, oral, Daily with breakfast  heparin (porcine), 5,000 Units, subcutaneous, q8h  heparin, 1,900 Units, intra-catheter, After Dialysis  heparin, 1,900 Units, intra-catheter, After Dialysis  insulin lispro, 0-10 Units, subcutaneous, TID  ipratropium-albuteroL, 3 mL, nebulization, q6h while awake  levothyroxine, 250 mcg, oral, Daily before breakfast  metoprolol succinate XL, 50 mg, oral, Daily  mupirocin, , Topical, TID  pantoprazole, 40 mg, oral, BID  polyethylene glycol, 17 g, oral, Daily  vancomycin, 750 mg, intravenous, Once per day on Tuesday Thursday Saturday  venlafaxine XR, 150 mg, oral, Daily      Continuous medications     PRN medications  PRN medications: acetaminophen, albuterol, albuterol, alteplase, dextrose, dextrose, glucagon, glucagon, oxygen, sodium chloride 0.9%, sodium chloride 0.9%, vancomycin   Meds:   amiodarone, 200 mg, Daily with breakfast  aspirin, 81 mg, Daily  atorvastatin, 40 mg, Daily  clopidogrel, 75 mg, Daily  ferrous gluconate, 324 mg, Daily with breakfast  heparin (porcine), 5,000 Units, q8h  heparin, 1,900 Units, After Dialysis  heparin, 1,900 Units, After Dialysis  insulin lispro, 0-10 Units, TID  ipratropium-albuteroL, 3 mL, q6h while awake  levothyroxine, 250 mcg, Daily before breakfast  metoprolol succinate XL, 50 mg, Daily  mupirocin, , TID  pantoprazole, 40 mg, BID  polyethylene glycol, 17 g, Daily  vancomycin, 750 mg, Once per day on Tuesday Thursday Saturday  venlafaxine XR, 150 mg, Daily         acetaminophen, 975 mg, 4x daily PRN  albuterol, 2.5 mg, q2h PRN  albuterol, 2 puff, q4h PRN  alteplase, 2 mg, PRN  dextrose, 12.5 g, q15 min PRN  dextrose, 25 g, q15 min PRN  glucagon, 1 mg, q15 min PRN  glucagon, 1 mg, q15 min PRN  oxygen, , Continuous PRN - O2/gases  sodium chloride 0.9%, 10 mL, PRN  sodium chloride 0.9%, 10 mL, PRN  vancomycin, , Daily PRN        Heart Rate:  [60-67]   Temp:  [36.1 °C (97  °F)-37 °C (98.6 °F)]   Resp:  [16-21]   BP: (152-172)/(74-80)   SpO2:  [91 %-100 %]    Weight: 76.7 kg (169 lb)   General appearance: Awake and alert, oriented, . No distress  HEENT: supple, moist oral mucosa, no mouth ulcers  Neck: No JVD  Skin: no apparent rash  Heart: heart sounds 1 & 2 present and normal, no murmurs heard or friction rub  Lungs: Adequate air entry,  no wheezing/crackles  Abdomen: soft, non tender, no masses palpated, no flank tenderness  Extremities: No  edema, no joint swelling,  : deferred  Neuro: No FND, no asterixis     Results from last 72 hours   Lab Units 05/20/24  0628   SODIUM mmol/L 136   POTASSIUM mmol/L 4.8   CO2 mmol/L 29   BUN mg/dL 29*   CREATININE mg/dL 2.97*   PHOSPHORUS mg/dL 4.5   CALCIUM mg/dL 7.9*   ALBUMIN g/dL 2.5*   GLUCOSE mg/dL 217*   WBC AUTO x10*3/uL 9.6        Assessment and Plan:    ESRD-HD admitted with outpatient failure of abx for cellulitis.     # Non-Oliguric ESRD  - Hemodialysis dependent since 02/24/2024  - Follows outpatient with Dr. Mendez Ríos and gets her hemodialysis sessions at [facility]  - Baseline creatinine around 2.5  - Dialysis session Tuesday/Thursday/Saturday.  No HD today  - Plan for hemodialysis session tomorrow if patient stays  - Okay to discharge from point.  - Continue 80 mg hemodialysis sessions outpatient per schedule    Access: Double-lumen tunneled right subclavian  BP: acceptable during treatment   Renal Diet   Daily renal MVI   Continue 3 x per week hemodialysis; SW to ensure availability of o/p HD chair at discharge    Patient staffed with attending Dr. Pena. Thank you for the interesting consult. Please contact via Epic chat/Haiku with any questions/concerns.     Renu Mc MD  Internal Medicine, PGY- 1  05/20/24 at 12:39 PM     Disclaimer: Documentation completed with the information available at the time of input. The times in the chart may not be reflective of actual patient care times, interventions, or  procedures. Documentation occurs after the physical care of the patient.

## 2024-05-20 NOTE — PROGRESS NOTES
05/20/24 0851   Discharge Planning   Living Arrangements Alone  (friends stay with her from time to time)   Support Systems Family members;Friends/neighbors;Jainism/alise community   Assistance Needed Per nephew, who is patient's POA, patient lives alone in a one-story home. Nephew states patient's friends will stay with her at times to assist her as needed. Patient receives dialysis at [facility] [address] at [address] am. Nephew transports patient to dialysis, patient does not drive. Patient is A&O X3, on room air at baseline, is mostly independent with occasional assistance needed after dialysis with ADLs and uses a walker for ambulation.   Type of Residence Private residence   Number of Stairs to Enter Residence 5   Number of Stairs Within Residence 0   Do you have animals or pets at home? Yes   Type of Animals or Pets cat   Who is requesting discharge planning? Provider   Home or Post Acute Services In home services   Type of Home Care Services Home health aide;Home PT;Home nursing visits   Patient expects to be discharged to: Patient denies SNF! Patient stated she wants to go home with resumption of  Home Health   Does the patient need discharge transport arranged? Yes   RoundTrip coordination needed? Yes   Has discharge transport been arranged? No

## 2024-05-20 NOTE — PROGRESS NOTES
Occupational Therapy                 Therapy Communication Note    Patient Name: Daphne Morris  MRN: [id number]  Today's Date: 5/20/2024     Discipline: Occupational Therapy    Comment: Pt to dc home today. Patient adamantly refuses snf. Wants to dc home with resumption of home care. Acute OT evaluation to assist with discharge planning not indicated. Eval not complete.

## 2024-05-20 NOTE — PROGRESS NOTES
05/20/24 1200   Discharge Planning   Patient expects to be discharged to: Made aware patient to dc home today. Patient aware of dc and is fine with dc-IMM obtained. Patient adamantly refuses snf. Wants to dc home with resumption of home care. Patient is arranging transport home with family/friend. RN and RN Navigator on floor aware of dc. Patient needs 1 more dose of Vanco IV at dialysis and SW has messaged the dialysis center. Patient cleared for dc from Transition's standpoint.

## 2024-05-20 NOTE — CARE PLAN
MERYL sent updated notes to Rama Barkley regarding pt needing GRAZYNA x1 dose to determine if Rubia can administer.  MERYL will follow to confirm.

## 2024-05-20 NOTE — PROGRESS NOTES
Daphne Morris is a 69 y.o. female on day 5 of admission presenting with Cellulitis.    Subjective   Interval History: no fever, no new complaints        Review of Systems    Objective   Range of Vitals (last 24 hours)  Heart Rate:  [60-67]   Temp:  [36.1 °C (97 °F)-37 °C (98.6 °F)]   Resp:  [16-21]   BP: (152-172)/(74-80)   SpO2:  [91 %-100 %]   Daily Weight  05/16/24 : 74.8 kg (164 lb 14.5 oz)    Body mass index is 26.63 kg/m².    Physical Exam  Constitutional:       Appearance: Normal appearance.   HENT:      Head: Normocephalic and atraumatic.      Mouth/Throat:      Mouth: Mucous membranes are moist.      Pharynx: Oropharynx is clear.   Eyes:      Pupils: Pupils are equal, round, and reactive to light.   Cardiovascular:      Rate and Rhythm: Normal rate and regular rhythm.      Heart sounds: Normal heart sounds.   Pulmonary:      Effort: Pulmonary effort is normal.      Breath sounds: Normal breath sounds.   Abdominal:      General: Abdomen is flat. Bowel sounds are normal.      Palpations: Abdomen is soft.   Musculoskeletal:      Cervical back: Normal range of motion.      Comments: Legs wounds, less edema and redness   Neurological:      Mental Status: She is alert.         Antibiotics  metoprolol succinate (Toprol-XL) 24 hr tablet  heparin (porcine) injection 5,000 Units  polyethylene glycol (Glycolax, Miralax) packet 17 g  glucagon (Glucagen) injection 1 mg  dextrose 50 % injection 25 g  glucagon (Glucagen) injection 1 mg  dextrose 50 % injection 12.5 g  insulin lispro (HumaLOG) injection 0-10 Units  acetaminophen (Tylenol) tablet 975 mg  albuterol 90 mcg/actuation inhaler 2 puff  venlafaxine XR (Effexor-XR) 24 hr capsule 150 mg  pantoprazole (ProtoNix) EC tablet 40 mg  metoprolol succinate XL (Toprol-XL) 24 hr tablet 50 mg  levothyroxine (Synthroid, Levoxyl) tablet 250 mcg  ferrous gluconate (Fergon) 324 (38 Fe) mg tablet 324 mg  clopidogrel (Plavix) tablet 75 mg  atorvastatin (Lipitor) tablet 40  mg  aspirin EC tablet 81 mg  amiodarone (Pacerone) tablet 200 mg  vancomycin (Vancocin) pharmacy to dose - pharmacy monitoring  vancomycin in dextrose 5 % (Vancocin) IVPB 750 mg  alteplase (Cathflo Activase) injection 2 mg  sodium chloride 0.9% flush 10 mL  sodium chloride 0.9% flush 10 mL      Relevant Results  Labs  Results from last 72 hours   Lab Units 05/20/24  0628 05/19/24  0558 05/18/24  0602   WBC AUTO x10*3/uL 9.6 8.1 9.7   HEMOGLOBIN g/dL 9.2* 9.5* 9.3*   HEMATOCRIT % 32.4* 34.3* 33.5*   PLATELETS AUTO x10*3/uL 333 291 328     Results from last 72 hours   Lab Units 05/20/24 0628 05/19/24  0558 05/18/24  1439   SODIUM mmol/L 136 132* 136   POTASSIUM mmol/L 4.8 4.2 3.9   CHLORIDE mmol/L 100 98 100   CO2 mmol/L 29 31 31   BUN mg/dL 29* 14 18   CREATININE mg/dL 2.97* 1.91* 2.07*   GLUCOSE mg/dL 217* 145* 132*   CALCIUM mg/dL 7.9* 7.8* 7.5*   ANION GAP mmol/L 12 7* 9*   EGFR mL/min/1.73m*2 16* 28* 25*   PHOSPHORUS mg/dL 4.5 3.7 3.3     Results from last 72 hours   Lab Units 05/20/24  0628 05/19/24  0558 05/18/24  1439   ALBUMIN g/dL 2.5* 2.5* 2.6*     Estimated Creatinine Clearance: 18.2 mL/min (A) (by C-G formula based on SCr of 2.97 mg/dL (H)).  C-Reactive Protein   Date Value Ref Range Status   05/15/2024 5.41 (H) <1.00 mg/dL Final     CRP   Date Value Ref Range Status   05/06/2021 8.9 (H) 0 - 2.0 MG/DL Final     Comment:     Performed at [address] [address]   06/04/2020 2.3 (H) 0 - 2.0 MG/DL Final     Comment:     Performed at [address] [address]     Microbiology  Reviewed  Imaging  reviewed        Assessment/Plan   Legs none healing wounds / infection, rule out calciphylaxis   Pleural effusion sp thoracentesis     Recommendations :  Continue Vancomycin, needs the last dose with the next dialysis  Discussed with the medical team     I spent minutes in the professional and overall care of this patient.      Myke Chavarria MD

## 2024-05-21 PROCEDURE — 1090000002 HH PPS REVENUE DEBIT

## 2024-05-21 PROCEDURE — 1090000001 HH PPS REVENUE CREDIT

## 2024-05-22 ENCOUNTER — HOME CARE VISIT (OUTPATIENT)
Dept: HOME HEALTH SERVICES | Facility: HOME HEALTH | Age: 69
End: 2024-05-22
Payer: MEDICARE

## 2024-05-22 VITALS
RESPIRATION RATE: 16 BRPM | OXYGEN SATURATION: 93 % | TEMPERATURE: 98.3 F | HEART RATE: 74 BPM | SYSTOLIC BLOOD PRESSURE: 102 MMHG | DIASTOLIC BLOOD PRESSURE: 54 MMHG

## 2024-05-22 LAB
ATRIAL RATE: 86 BPM
P AXIS: 89 DEGREES
P OFFSET: 175 MS
P ONSET: 111 MS
PR INTERVAL: 216 MS
Q ONSET: 219 MS
QRS COUNT: 14 BEATS
QRS DURATION: 116 MS
QT INTERVAL: 418 MS
QTC CALCULATION(BAZETT): 500 MS
QTC FREDERICIA: 471 MS
R AXIS: -53 DEGREES
T AXIS: 90 DEGREES
T OFFSET: 428 MS
VENTRICULAR RATE: 86 BPM

## 2024-05-22 PROCEDURE — 1090000002 HH PPS REVENUE DEBIT

## 2024-05-22 PROCEDURE — G0299 HHS/HOSPICE OF RN EA 15 MIN: HCPCS | Mod: HHH

## 2024-05-22 PROCEDURE — 1090000001 HH PPS REVENUE CREDIT

## 2024-05-22 SDOH — HEALTH STABILITY: MENTAL HEALTH: SMOKING IN HOME: 1

## 2024-05-22 SDOH — ECONOMIC STABILITY: HOUSING INSECURITY: EVIDENCE OF SMOKING MATERIAL: 1

## 2024-05-22 ASSESSMENT — ENCOUNTER SYMPTOMS
PAIN LOCATION - PAIN FREQUENCY: CONSTANT
HEADACHES: 1
PAIN LOCATION - PAIN QUALITY: ACHING
PAIN LOCATION - PAIN SEVERITY: 7/10
PERSON REPORTING PAIN: PATIENT
SKIN LESIONS: 1
APPETITE LEVEL: FAIR
SHORTNESS OF BREATH: 1
DEPRESSED MOOD: 1
CHANGE IN APPETITE: UNCHANGED
PAIN LOCATION - PAIN DURATION: NEW ONSET
PAIN: 1
PAIN LOCATION: NECK
DYSPNEA ACTIVITY LEVEL: AT REST

## 2024-05-22 ASSESSMENT — ACTIVITIES OF DAILY LIVING (ADL)
ENTERING_EXITING_HOME: STAND BY ASSIST
AMBULATION ASSISTANCE: STAND BY ASSIST
OASIS_M1830: 05
CURRENT_FUNCTION: STAND BY ASSIST

## 2024-05-22 NOTE — SIGNIFICANT EVENT
Follow Up Phone Call    Outgoing phone call    Spoke to: Daphne Morris Relationship:self   Phone number: [phone]      Outcome: contacted patient/ family   No chief complaint on file.         Diagnosis:Not applicable    States she is feeling better. This nurse confirmed that homecare is scheduled for today. No further questions or concerns.

## 2024-05-23 PROCEDURE — 1090000002 HH PPS REVENUE DEBIT

## 2024-05-23 PROCEDURE — 1090000001 HH PPS REVENUE CREDIT

## 2024-05-23 NOTE — PROGRESS NOTES
Chief Complaint:   SNF F/U  Acute on chronic diastolic heart failure  Physical deconditioning/weakness  Acute hypoxic respiratory failure   Pleural effusion  ANDREA  Cardiorenal syndrome   Anemia    HPI:   68 year-old female presenting to [facility] ER on 2/1/24 with shortness of breath, difficulty ambulating, generalized weakness, and BLE edema. Work-up in ER: /63, HR 53, Temp 36.6, RR 20, SpO2 95%, EKG showed sinus bradycardia, CXR showed a new large right pleural effusion and basilar atelectasis, underlying pneumonia is not excluded, CT of chest showed large right pleural effusion with complete collapse of the RLL, atelectasis/consolidation at the RUL and RML, tree-in-bud airspace opacities at the RUL and LLL, and left thyroid gland nodule, UA + glucose and protein, Hgb 9.5, Hct 32.8, Glu 220, K+ 5.4, BUN 49, Cr 3.25, BNP 1,786. Case was discussed with cardiology and nephrology, who recommended IV diuresis. Pt. was started on IV diuretics and IV ATB and admitted to [facility] for further evaluation and treatment. Hospital course:    Acute hypoxic hypercapnic respiratory failure 2/2 CAP/COPD exacerbation/acute on chronic heart failure/large right pleural effusion-2 gm Na+ diet, I&O, daily weights, BiPAP --> supplemental O2, IV lasix, IV solu-medrol, IV Rocephin, IV Azithromycin, duonebs, cardiology consult, pulmonology consult, underwent thoracentesis on 2/2 (2300 ml of clear pleural fluid was obtained), TTE showed normal LVSF with 50-56% EF, impaired relaxation pattern of left ventricular diastolic filling, mildly reduced right ventricular systolic function, underwent RHC on 2/7 which showed mild pulmonary hypertension, diuretic changed to PO upon discharge, patient to F/U with cardiology after discharge  ANDREA-nephrology consult, renal US negative for left hydronephrosis, cardiorenal syndrome suspected 2/2 acute decompensated CHF, F/U with nephrologist after discharge (Dr. Mitchell)  Generalized weakness-PT/OT  evaluations, recommending SNF   Left knee pain-XR showed small joint effusion and degenerative changes, reticular increased density in the subcutaneous tissues which may represent lymphedema and/or cellulitis, Tylenol prn for pain   AFib/CAD/HLD-s/p Watchman in August 2023, c/w atorvastatin, amiodarone, metoprolol, ASA, plavix, monitored on telemetry   DM2-accuchecks, c/w lantus, ISS, jardiance held   GERD-PPI  Depression-c/w venlafaxine   Constipation-laxatives and stool softeners added    Pt. was HDS and discharged to [facility] on 2/15/24. On 2/18/24, patient was sent to the ER for lethargy and hypoxia. Work-up in ER: D-Dimer 13,011, BUN 58, Cr 2.88, WBC 6.7, Hgb 8.0, Trop 43, BNP 1,444, CXR showed stable pleural and parenchymal opacities in the mid and lower right hemithorax and a mild new left pleural effusion with left basilar atelectasis or edema, SpO2 82% on RA. Pt. was given Lasix 80 mg. US of BLE was negative for DVT. Pt. was placed on BiPAP and transferred to [facility] for further evaluation and treatment. Hospital course:     Acute on chronic hypoxic/hypercapnic respiratory failure 2/2 Influenza B, COPD exacerbation, and decompensated heart failure-BiPAP --> high-flow O2 --> supplemental O2, tamiflu, prednisone, duonebs, diuresis --> dialysis, pulmonology and cardiology consulted   Acute on chronic diastolic CHF-strict I&O, daily weight, 2 gm Na+ diet, c/w metoprolol, cardiology consult   Elevated D-Dimer-US negative for DVT, VQ scan showed low probability of PE  BLE edema/redness-possibly underlying cellulitis, leg elevation, local wound care, IV vancomycin  Acute on chronic anemia-required blood transfusion, iron started, CBC monitored   ANDREA on CKD-nephrology consulted, TDC placed to R chest on 3/1 by general surgery, dialysis started    Pt. was HDS and discharged back to [facility] on 3/5/24. On 3/7, patient was reported to have increased BLE edema with fluid-filled blisters and c/o pain.  Venous US was ordered, which was negative for DVT. On 3/9, patient was at dialysis and was observed to be confused and c/o dizziness. SpO2 was 66% and she was placed on 5 L of O2. Pt. refused to go to the ER, but eventually agreed to evaluation. Work-up in ER: CXR showed a right basilar opacity, Flu negative, COVID negative, RSV negative. Pt. was discharged back to SNF on Levaquin for pneumonia.     On 3/13, routine blood work revealed Hgb of 6.4. Pt. to be set-up for outpatient blood transfusion at [facility]. On 3/14, staff reported patient with low BP (77/58) and lethargy. She was sent to [facility] ER for evaluation. In the ER, /114, HR 65, RR 28, SpO2 95%. Labs were drawn, but the ER discharged patient to dialysis prior to the labs resulting. Transport reported that patient was very lethargic and drowsy on the ride to dialysis to the point where he almost took the patient back to the ER. Upon review of the ER labs, Glu was 24 and Hgb was 6.9. When patient arrived to dialysis, blood sugar was checked and was 47. She was given 1 tube of glucose and a snack with improvement noted in blood sugar to 147 as well as resolution of lethargy.     Patient is s/p blood transfusion on 3/15. Today, she denies dizziness, HA, vision changes, SOB, cough, or chest pain. Blood sugars reviewed in chart. Staff report no other clinical concerns at this time. Pt. states that she feels ready to discharge home soon.     ROS:    As above in HPI. Otherwise, all other systems have been reviewed and are negative for complaint.    Medications reviewed and verified in NH chart.     Patient Active Problem List   Diagnosis    Hypothyroidism    Acute on chronic diastolic heart failure (Multi)    Altered mental status    Anxiety    Chronic fatigue    Constipation by delayed colonic transit    Chronic obstructive pulmonary disease (Multi)    Type 2 diabetes mellitus (Multi)    Diabetic renal disease (Multi)    Gastroesophageal reflux disease     History of renal cell carcinoma    Hypertension    Peripheral vascular disease (CMS-HCC)    Leukocytosis    Lung nodule    Major depressive disorder, single episode, unspecified    Osteoarthritis    Paroxysmal atrial fibrillation (Multi)    Primary insomnia    Stage 3b chronic kidney disease (CKD) (Multi)    Trouble walking    Hyperlipidemia, unspecified    ESRD on dialysis (Multi)    ANDREA (acute kidney injury) (CMS-HCC)    ASHD (arteriosclerotic heart disease)    At risk for falling    Essential tremor    Vitamin D deficiency    Acute on chronic respiratory failure with hypoxia (Multi)    Pulmonary hypertension (Multi)    Pulmonary edema ([facility])    Influenza B    History of right nephrectomy    Cognitive impairment    Unspecified open wound, right lower leg, initial encounter    Chronic kidney disease, stage V (Multi)    Cellulitis of right lower extremity    Open wound of right forearm    Open wound of right upper arm    Open wound of left lower leg    Abrasion of buttock, infected    Acute cystitis with hematuria    Complication associated with dialysis catheter    Cellulitis    Iron deficiency anemia due to chronic blood loss    Cerebrovascular accident (CVA) (Multi)        Past Medical History:   Diagnosis Date    Anal fissure 10/12/2023    Anemia     ASHD (arteriosclerotic heart disease)     At risk for falls     Atrial fibrillation (Multi)     CHF (congestive heart failure) (Multi)     CKD (chronic kidney disease)     COPD (chronic obstructive pulmonary disease) (Multi)     CVA (cerebral vascular accident) (Multi)     2021- right-sided weakness    Depression     Diabetes mellitus (Multi)     GERD (gastroesophageal reflux disease)     H/O pleural effusion     Hyperlipidemia     Hypertension     Hypothyroidism     Hypoxia     Impacted cerumen, left ear     Impacted cerumen of left ear    Noncompliance     Osteoarthritis     Perianal dermatitis 10/12/2023    Personal history of other diseases of the  respiratory system     History of acute bronchitis    PVD (peripheral vascular disease) (CMS-HCC)     Renal carcinoma, right (Multi)     s/p partial nephrectomy 2021    Respiratory failure (Multi)     TIA (transient ischemic attack)     Vasculitis (CMS-HCC) 10/12/2023    Weakness        Past Surgical History:   Procedure Laterality Date    ANKLE SURGERY          CARDIAC CATHETERIZATION N/A 2024    Procedure: Right Heart Cath;  Surgeon: Nicholas Antonio MD;  Location: [facility] Cardiac Cath Lab;  Service: Cardiovascular;  Laterality: N/A;    CATARACT EXTRACTION Right     2019     SECTION, CLASSIC      MR CHEST ANGIO W AND WO IV CONTRAST  2023    MR CHEST ANGIO W AND WO IV CONTRAST 2023 [facility] MRI    MR HEAD ANGIO WO IV CONTRAST  2021    MR HEAD ANGIO WO IV CONTRAST [facility]    NEPHRECTOMY  2021    SHOULDER SURGERY      2009       Family History   Problem Relation Name Age of Onset    Hypertension Mother      Diabetes Father      Heart disease Father      Cancer Sister      Cancer Brother      Diabetes Daughter      Asthma Daughter      Heart attack Daughter      Diabetes Maternal Grandfather      Heart disease Paternal Grandmother      Diabetes Other      Hypertension Other      COPD Other      Other (chronic lung disease) Other         Social History     Tobacco Use   Smoking Status Former    Current packs/day: 0.50    Average packs/day: 0.5 packs/day for 55.4 years (27.7 ttl pk-yrs)    Types: Cigarettes    Start date: 1969    Passive exposure: Never   Smokeless Tobacco Never       Social History     Substance and Sexual Activity   Alcohol Use Not Currently       Social History     Substance and Sexual Activity   Drug Use Yes    Types: Marijuana       Allergies   Allergen Reactions    Bee Venom Protein (Honey Bee) Swelling    Citalopram Hives, Other, Rash and Nausea/vomiting     Facial breakout, blisters, and rash    Codeine Headache, Other and Itching      Migraines    Influenza Virus Vaccines Hives    Latex Other     blisters    Latex, Natural Rubber Other     blisters    Lisinopril Swelling and Nausea/vomiting     Facial swelling    Penicillins Swelling, Headache and Unknown    Adhesive Tape-Silicones Other     blisters    Amoxicillin Swelling and Rash    Doxycycline Rash and Unknown    Oseltamivir Rash and Nausea/vomiting    Phenyleph-Min Oil-Petrolatum Other    Phenyleph-Shark Oil-Glyc-Pet Rash    Phenylephrine Nausea/vomiting    Prednisone Other and Nausea/vomiting     Yeast infection    Tuberculin Ppd Unknown    Xylometazoline Unknown        Vital Signs:   132/79-78-18-97.6-97% on RA     Physical Exam:  General: Sitting up in WC in NAD, alert   Head/Face: NCAT, symmetrical  Eyes: PERRLA, no injection, no discharge  ENT: Hearing not impaired, ears without scars or lesions, nasal mucosa and turbinates pink, septum midline, lips pink and moist  Neck: Supple, symmetrical  Respiratory: CTA but diminished without adventitious sounds, respirations even and nonlabored without use of accessory muscles, good air exchange  Cardio: RRR without murmur or gallops, normal S1S2, + anasarca (improved), trace BLE edema (R > L), pedal pulses 2+/4 bilaterally  Chest/Breast: Symmetrical, tunneled dialysis cath to R chest   GI: BS x 4, normoactive, non-distended, abd round and soft, no masses or tenderness  : No suprapubic tenderness or distention  MSK: Gait not assessed, joints with full ROM without pain or contractures  Skin: Skin warm and dry, no induration, dry skin to BLE  Neurologic: Cranial nerves II through XII intact, superficial touch and pain sensation intact  Psychiatric: Alert to person, place, and time, calm and cooperative     Results/Data:   4/11/24: K+ 4.7, Phos 4.7  3/27/24: Glu 108, BUN 23 Cr 1.59, GFR 34, CO2 34, Kel 7.9, Hgb 7.8, Hct 26.3  3/18/24: BUN 38, Cr 2.41, GFR 21, Na+ 136, K+ 3.5, Kel 7.4, Hgb 7.3, Hct 23.8, Plt 175  3/14/24: Glu 24, Hgb 6.9  3/9/24:  Glu 95, K+ 3.4, CO2 33, BUN 7, Cr 0.84, GFR 75, Kel 7.6, Alb 2.6, Hgb 7.9, Hct 27.0  3/7/24: HgbA1C 7.5  3/6/24: HgbA1C 7.7, Chol 147, , HDL 65, LDL 51    Assessment/Plan:  Acute on chronic respiratory failure with hypoxia and hypercapnia 2/2 pneumonia/COPD exacerbation/acute on chronic HFpEF/right pleural effusion-c/w supplemental O2 as needed, 2 gm Na+ diet, monitor weights, 1500 ml fluid restriction, s/p thoracentesis (2/2/24) with 2.3 L of fluid removed, c/w dialysis as scheduled, s/p Levaquin (end date 3/18), duonebs prn, monitor respiratory status closely  Physical deconditioning/generalized weakness-c/w PT/OT, safety and fall precautions  ANDREA on CKD-in setting of acute decompensated heart failure, avoid nephrotoxic drugs, c/w dialysis, monitor R chest dialysis catheter site, monitor renal function, F/U with nephrologist as scheduled  AFib/CAD/HTN/HLD-s/p Watchman procedure (Aug. 2023), s/p RHC (2/27/24) which showed mild pulmonary hypertension, c/w amiodarone, aspirin, atorvastatin, metoprolol, and plavix, monitor BP and HR, F/U with cardiologist as scheduled  Hypokalemia-monitor, replete as needed  Anemia-s/p 1 unit of PRBCs on 3/15, c/w iron supplement, monitor CBC, transfuse for Hgb less than 7.0  GERD-c/w PPI  Depression-c/w venlafaxine (increased to 150 mg daily on 3/11 per psych), supportive care, Psych following  DM2-LCS diet, accuchecks, c/w humalog ISS with meals, c/w basaglar 10 units daily, HS snack, BP and lipid control, yearly eye exam, diabetic foot care, monitor HgbA1C  BLE edema/pain-elevate as tolerated, tubi- to BLE, local wound care, c/w Tylenol ES 1000 mg BID, consider gabapentin for neuropathy, monitor closely  Influenza B-RESOLVED, positive on 2/19/24, treated with Tamiflu  Hypoalbuminemia-increase protein intake, monitor albumin level, RD consult  Hypothyroidism/left thyroid nodule-c/w levothyroxine, monitor TSH and FT4, F/U with PCP after discharge for thyroid  ultrasound  Dry skin of BLE-c/w ammonium lactate lotion, monitor closely     Orders:  NNO    Code Status:   Full Code

## 2024-05-24 ENCOUNTER — HOME CARE VISIT (OUTPATIENT)
Dept: HOME HEALTH SERVICES | Facility: HOME HEALTH | Age: 69
End: 2024-05-24
Payer: MEDICARE

## 2024-05-24 VITALS
SYSTOLIC BLOOD PRESSURE: 110 MMHG | HEART RATE: 76 BPM | OXYGEN SATURATION: 94 % | RESPIRATION RATE: 16 BRPM | DIASTOLIC BLOOD PRESSURE: 56 MMHG | TEMPERATURE: 97.6 F

## 2024-05-24 PROCEDURE — 1090000002 HH PPS REVENUE DEBIT

## 2024-05-24 PROCEDURE — 1090000001 HH PPS REVENUE CREDIT

## 2024-05-24 PROCEDURE — G0300 HHS/HOSPICE OF LPN EA 15 MIN: HCPCS | Mod: HHH

## 2024-05-24 SDOH — ECONOMIC STABILITY: GENERAL

## 2024-05-24 SDOH — ECONOMIC STABILITY: HOUSING INSECURITY: EVIDENCE OF SMOKING MATERIAL: 1

## 2024-05-24 SDOH — HEALTH STABILITY: MENTAL HEALTH: SMOKING IN HOME: 1

## 2024-05-24 ASSESSMENT — PAIN SCALES - PAIN ASSESSMENT IN ADVANCED DEMENTIA (PAINAD)
CONSOLABILITY: 0
BODYLANGUAGE: 0
TOTALSCORE: 0
BREATHING: 0
CONSOLABILITY: 0 - NO NEED TO CONSOLE.
BODYLANGUAGE: 0 - RELAXED.
NEGVOCALIZATION: 0
NEGVOCALIZATION: 0 - NONE.
FACIALEXPRESSION: 0 - SMILING OR INEXPRESSIVE.
FACIALEXPRESSION: 0

## 2024-05-24 ASSESSMENT — ENCOUNTER SYMPTOMS
STOOL FREQUENCY: DAILY
DOUBLE VISION: 1
DENIES PAIN: 1
LAST BOWEL MOVEMENT: 66984
PERSON REPORTING PAIN: PATIENT
BOWEL PATTERN NORMAL: 1
APPETITE LEVEL: GOOD
BLURRED VISION: 1

## 2024-05-24 ASSESSMENT — ACTIVITIES OF DAILY LIVING (ADL): MONEY MANAGEMENT (EXPENSES/BILLS): INDEPENDENT

## 2024-05-25 ENCOUNTER — HOME CARE VISIT (OUTPATIENT)
Dept: HOME HEALTH SERVICES | Facility: HOME HEALTH | Age: 69
End: 2024-05-25
Payer: MEDICARE

## 2024-05-25 PROCEDURE — 1090000002 HH PPS REVENUE DEBIT

## 2024-05-25 PROCEDURE — 1090000001 HH PPS REVENUE CREDIT

## 2024-05-26 PROCEDURE — 1090000002 HH PPS REVENUE DEBIT

## 2024-05-26 PROCEDURE — 1090000001 HH PPS REVENUE CREDIT

## 2024-05-27 PROCEDURE — G0180 MD CERTIFICATION HHA PATIENT: HCPCS | Performed by: NURSE PRACTITIONER

## 2024-05-27 PROCEDURE — 1090000002 HH PPS REVENUE DEBIT

## 2024-05-27 PROCEDURE — 1090000001 HH PPS REVENUE CREDIT

## 2024-05-28 ENCOUNTER — TELEPHONE (OUTPATIENT)
Dept: PRIMARY CARE | Facility: CLINIC | Age: 69
End: 2024-05-28
Payer: MEDICARE

## 2024-05-28 PROCEDURE — 1090000001 HH PPS REVENUE CREDIT

## 2024-05-28 PROCEDURE — 1090000002 HH PPS REVENUE DEBIT

## 2024-05-29 ENCOUNTER — OFFICE VISIT (OUTPATIENT)
Dept: PRIMARY CARE | Facility: CLINIC | Age: 69
End: 2024-05-29
Payer: MEDICARE

## 2024-05-29 ENCOUNTER — DOCUMENTATION (OUTPATIENT)
Dept: PRIMARY CARE | Facility: CLINIC | Age: 69
End: 2024-05-29
Payer: MEDICARE

## 2024-05-29 VITALS
OXYGEN SATURATION: 99 % | RESPIRATION RATE: 18 BRPM | WEIGHT: 159 LBS | BODY MASS INDEX: 25.68 KG/M2 | TEMPERATURE: 97.6 F | HEART RATE: 66 BPM | SYSTOLIC BLOOD PRESSURE: 170 MMHG | DIASTOLIC BLOOD PRESSURE: 82 MMHG

## 2024-05-29 DIAGNOSIS — E78.2 MIXED HYPERLIPIDEMIA: ICD-10-CM

## 2024-05-29 DIAGNOSIS — E11.65 TYPE 2 DIABETES MELLITUS WITH HYPERGLYCEMIA, WITH LONG-TERM CURRENT USE OF INSULIN (MULTI): ICD-10-CM

## 2024-05-29 DIAGNOSIS — S81.802D OPEN WOUND OF LEFT LOWER LEG, SUBSEQUENT ENCOUNTER: ICD-10-CM

## 2024-05-29 DIAGNOSIS — I10 PRIMARY HYPERTENSION: Primary | ICD-10-CM

## 2024-05-29 DIAGNOSIS — E03.9 ACQUIRED HYPOTHYROIDISM: ICD-10-CM

## 2024-05-29 DIAGNOSIS — Z79.4 TYPE 2 DIABETES MELLITUS WITH HYPERGLYCEMIA, WITH LONG-TERM CURRENT USE OF INSULIN (MULTI): ICD-10-CM

## 2024-05-29 PROCEDURE — 1126F AMNT PAIN NOTED NONE PRSNT: CPT | Performed by: NURSE PRACTITIONER

## 2024-05-29 PROCEDURE — 99349 HOME/RES VST EST MOD MDM 40: CPT | Performed by: NURSE PRACTITIONER

## 2024-05-29 PROCEDURE — 1090000001 HH PPS REVENUE CREDIT

## 2024-05-29 PROCEDURE — 3062F POS MACROALBUMINURIA REV: CPT | Performed by: NURSE PRACTITIONER

## 2024-05-29 PROCEDURE — 3079F DIAST BP 80-89 MM HG: CPT | Performed by: NURSE PRACTITIONER

## 2024-05-29 PROCEDURE — 1159F MED LIST DOCD IN RCRD: CPT | Performed by: NURSE PRACTITIONER

## 2024-05-29 PROCEDURE — 1157F ADVNC CARE PLAN IN RCRD: CPT | Performed by: NURSE PRACTITIONER

## 2024-05-29 PROCEDURE — 3077F SYST BP >= 140 MM HG: CPT | Performed by: NURSE PRACTITIONER

## 2024-05-29 PROCEDURE — 1160F RVW MEDS BY RX/DR IN RCRD: CPT | Performed by: NURSE PRACTITIONER

## 2024-05-29 PROCEDURE — 1123F ACP DISCUSS/DSCN MKR DOCD: CPT | Performed by: NURSE PRACTITIONER

## 2024-05-29 PROCEDURE — 1111F DSCHRG MED/CURRENT MED MERGE: CPT | Performed by: NURSE PRACTITIONER

## 2024-05-29 PROCEDURE — 1090000002 HH PPS REVENUE DEBIT

## 2024-05-29 RX ORDER — FLASH GLUCOSE SCANNING READER
EACH MISCELLANEOUS
Qty: 1 EACH | Refills: 0 | Status: SHIPPED | OUTPATIENT
Start: 2024-05-29

## 2024-05-29 RX ORDER — FLASH GLUCOSE SENSOR
KIT MISCELLANEOUS
Qty: 1 EACH | Refills: 11 | Status: SHIPPED | OUTPATIENT
Start: 2024-05-29

## 2024-05-29 ASSESSMENT — ENCOUNTER SYMPTOMS
HEMATOLOGIC/LYMPHATIC NEGATIVE: 1
CARDIOVASCULAR NEGATIVE: 1
RHINORRHEA: 1
SHORTNESS OF BREATH: 1
FATIGUE: 1
COUGH: 1
GASTROINTESTINAL NEGATIVE: 1
ARTHRALGIAS: 1
WOUND: 1
WEAKNESS: 1
EYES NEGATIVE: 1

## 2024-05-29 ASSESSMENT — PAIN SCALES - GENERAL: PAINLEVEL: 0-NO PAIN

## 2024-05-29 NOTE — PROGRESS NOTES
House Calls CM in home visit with provider. Currently receiving skilled home care. Had one pill box empty and one partially filled. Stated she has been unable to locate her pill bottles. Some medications found in various places in room. Stated there are prescriptions ready for  at pharmacy. She is unable to locate her [employer] reader. Order placed for reader/sensors by provider. Supplies for wound care and from hospital/rehab stay as well as paperwork in disarray. Stated her neighbor was coming over today to clean. Has limited support from friend and nephew for errands and transportation. Ambulates with walker. Reports being able to sponge bathe independently. She was scheduled to have an assessment for in home assistance before she was hospitalized that was not completed. She is in agreement for referral for a Passport Assessment. Message left with ELIZABET.  Will follow up in 2 weeks.

## 2024-05-29 NOTE — PROGRESS NOTES
Subjective   Patient ID: Daphne Morris is a 69 y.o. female who is being seen for post acute visit for cellulitis..    HPI Pt seen in single family trailer unaccompanied. PMHx: DM, ESRF with dialysis. PAF, HTN, HLD, ASHD, CHF, Hypothyroid, GERD, CVA, Essential tremor, Cellulitis. Pt seen sitting on wooden chair in living room without pants on. Pt alert and oriented and cooperative with examination. Pt seen in conjuction with  Autumn Linder RN who assisted pt with filling pill boxes. Pt with recent admission to [facility] for cellulitis of left lower leg wound. Rehab was suggested or pt but she refused. Pt is currently not on any medication for infection and will be seeing wound NP Deanna Villa on Friday 5/31 which pt states she also has a visit with home health care scheduled that day. Pt active with  home health care. Pts trailer cluttered and unkempt, pt unable to locate all her medications and states they are at the pharmacy to be picked up. Pt in need of her amiodarone and metoprolol. Pt has filled pill box that her home health nurse set up last week that have no days missng does not appear pt taking her medications. Pt also can not locate her device reader for her [employer], new one ordered. Pt states she has not been checking her blood sugars and has been dosing her insulin by how she feels. Pt has been getting prepared frozen meals sent though the insurance company that she likes getting. Pt states her neighbor is coming over today to help her clean her trailer. Pt compliant with going to [facility] and says she likes going. Pt with right upper chest wall dialysis catheter. Pt states she can not shower because of it but is able to give herself sponge baths.  going to place a referral to [facility] on aging for housekeeping and an aide, to see if pt qualifies. Pt denies fever, chills, night sweats, headaches or dizziness, admits to cold  intolerance. Pt with c/o LOCK and moist non productive cough. Pt denies CP or palpitaitons. Denies N/V/D or constipation reports bowel movement daily. Pt still urinating without difficulty and voiding sufficient amounts per pt. Pt ambulates with walker and denies any falls since returning from hospital. Pt with dressing to left lower leg partially falling off. Pt states she can change the dressing but appears it has been on for days. Dressing changed, area cleansed with normal saline, aquacel applied and dry sterile dressing applied. Pt with some discomfort when cleaning wound. Pt states she is changing it daily but again does not seem to be the case. Pt with no other complaints or concerns. Pt states nephew will  prescriptions and pt made a note of which ones still need to be added to the pill box.   Home Visit Medically necessary due to: pt has chronic condition that makes access to a traditional office visit very difficult, illness or condition that results in activity limitation or restriction that impacts the ability to leave home such as; unsteady gait/ poor condition.        Hospital Course  Daphne Morris is a 69 y.o. female presenting with a PMHx of recurrent falls, cellulitis, CKD on dialysis, HTN, T2DM on insulin, anemia secondary to CKD, hypothyroidism, afib not on AC, and depression with anxiety who presents from [facility] for outpatient failure of abx for cellulitis. Pt states she has had recurrent episodes of trauma to the arms and legs causing ulcerative wounds that are incredibly painful. She was discharged home from the hospital 5/2 with a course of oral cephalexin and seen by home healthcare. She went to dialysis today and the dialysis nurses told her to go to the ED due to erythema of the bilateral legs and her chronic leg ulcers. Pt states worsening of the lower leg ulcers on her [birth year] after she tripped using her walker while out at a [employer] (mechanical fall). She  reports no systemic systems including f/c, n/v, abdominal pain, syncope, malaise. She gets dialysis Tuesdays, Thursdays, Saturdays. Has not missed session recently.     Reviewing medical chart, she has been treated for recurrent cellulitis with media images of many ulcers present in the EMR. She has never had fever or elevated WBC. She states she has pain with her ulcers, but has not noticed swelling or spreading erythema. Denies legs are warm to the touch. A wound culture was obtained in April (unclear source) and showed MRSA with VRE.     ED course: Xray of tibia and fibula unremarkable except for soft tissue ulcer. CT C spine and head clear.  CMP with glucose 166, Cr 1.51, Ca 7.8, albumin 2.7, lactate normal, blood culture x2 obtained  CBC with Hb of 9.8  Treated with Vanc, Ceftriaxone     Floor Course: Continued Vancomycin. Suspected Pyoderma Gangrenosum vs. Calciphylaxis. ESR, CRP elevated. Consulted ID and Nephrology. Plan for HD 5/16. Currently on 2 L O2 NC,patient on RA at baseline. Continue to wean. Continue Vancomycin and HD. Overnight 5/16-5/17,  patient had increased work of breathing around 3 am. Patient was given albuterol, EKG and D-Dimer were obtained, and patient was put on 6L O2 with improvement. D-dimer was over 3,000. Heparin drip was started, as was V/Q scan which showed intermediate risk of PE. VBG showed pH 7.25, CO2 70, Bicarb 30.  This morning (5/17), patient denied SOB, currently on 4 L O2 NC. V/Q scan showed intermediate risk of PE. Will obtain CT PE after discussion with Nephrology due to patient's scheduled HD. Ordered US of lower extremities. Ordered repeat VBG, can consider Bipap based on results. Continue Vancomycin. As of 5/18, PE and DVT workup negative, discontinued heparin drip. Found to have significant pleural effusion on CT Angio; significant right-sided pleural effusion with surrounding subsegmental atelectasis; trace left-sided pleural effusion with surrounding atelectasis;  "stable tree-in-bud infiltrates noted in the right lung could be infectious. Incentive spirometer. Consulted Pulmonology. As of 5/19, patient had thoracentesis with removal of 1300 mL light yellow colored fluid. Continued vancomycin. Ordered PT/OT eval for recurrent falls/deconditioning. Patient currently on 3 L O2, continue to wean as tolerated. As of 5/20, patient is on RA. Patient refusing SNF, is medically ready for discharge. Needs one more dose of vancomycin after dialysis 5/21.     Review of Systems   Constitutional:  Positive for fatigue.   HENT:  Positive for congestion and rhinorrhea.    Eyes: Negative.    Respiratory:  Positive for cough (dry non productive) and shortness of breath.    Cardiovascular: Negative.    Gastrointestinal: Negative.    Endocrine: Positive for cold intolerance.   Genitourinary: Negative.    Musculoskeletal:  Positive for arthralgias (hips) and gait problem.   Skin:  Positive for wound.   Allergic/Immunologic: Positive for environmental allergies.   Neurological:  Positive for weakness.   Hematological: Negative.        Objective   /82 (BP Location: Right arm, Patient Position: Sitting)   Pulse 66   Temp 36.4 °C (97.6 °F) (Temporal)   Resp 18   Wt 72.1 kg (159 lb)   SpO2 99%   BMI 25.68 kg/m²     Current Outpatient Medications:     acetaminophen (Tylenol) 500 mg tablet, Take 2 tablets (1,000 mg) by mouth 2 times a day., Disp: , Rfl:     albuterol 90 mcg/actuation inhaler, Inhale 2 puffs every 4 hours if needed., Disp: , Rfl:     amiodarone (Pacerone) 200 mg tablet, Take 1 tablet (200 mg) by mouth once daily with breakfast., Disp: , Rfl:     aspirin 81 mg EC tablet, Take 1 tablet (81 mg) by mouth once daily., Disp: , Rfl:     atorvastatin (Lipitor) 40 mg tablet, Take 1 tablet (40 mg) by mouth once daily., Disp: , Rfl:     BD Calista 2nd Gen Pen Needle 32 gauge x 5/32\" needle, USE SUBCUTANEOUSLY AS DIRECTED TWICE DAILY, Disp: , Rfl:     clopidogrel (Plavix) 75 mg tablet, Take " 1 tablet (75 mg) by mouth once daily., Disp: , Rfl:     ferrous gluconate 324 (38 Fe) MG tablet, Take 1 tablet (324 mg) by mouth once daily with breakfast., Disp: 30 tablet, Rfl: 3    FreeStyle Shakir 3 [place] misc, Use as instructed, Disp: 1 each, Rfl: 0    FreeStyle Shakir 3 Sensor device, Change sensor Q 14 days, Disp: 2 each, Rfl: 11    HumaLOG KwikPen Insulin 100 unit/mL injection, up to 15 units Subcutaneous three times a day per sliding scaleup to 15 units Subcutaneous three times a day per sliding scale (Patient taking differently: up to 15 units Subcutaneous three times a day per sliding scale.    151-200 2 units 201-250 4 units 251-300 6 units 301-350 8 units 351-400 10 units   Indications: type 2 diabetes mellitus), Disp: 5 each, Rfl: 3    Lantus Solostar U-100 Insulin 100 unit/mL (3 mL) pen, Inject 10 Units under the skin once daily at bedtime., Disp: 9 mL, Rfl: 3    levothyroxine (Synthroid, Levoxyl) 200 mcg tablet, Take 1 tablet (200 mcg) by mouth once daily in the morning. Take before meals., Disp: 90 tablet, Rfl: 3    levothyroxine (Synthroid, Levoxyl) 50 mcg tablet, Take 1 tablet (50 mcg) by mouth once daily in the morning. Take before meals., Disp: 90 tablet, Rfl: 3    metoprolol succinate XL (Toprol-XL) 50 mg 24 hr tablet, Take 1 tablet (50 mg) by mouth once daily. Do not crush or chew., Disp: , Rfl:     oxygen DME/Hospice (O2) therapy, Inhale 3 L/min once daily at bedtime. Indications: decreased oxygen in the tissues or blood, Disp: , Rfl:     pantoprazole (ProtoNix) 40 mg EC tablet, Take 1 tablet (40 mg) by mouth 2 times a day., Disp: 60 tablet, Rfl: 6    venlafaxine XR (Effexor-XR) 75 mg 24 hr capsule, Take 2 capsules (150 mg) by mouth once daily., Disp: 180 capsule, Rfl: 3    FreeStyle Shakir 14 Day Sensor kit, USE AS DIRECTED TO CHECK SUGARS 3 TO 4 TIMES DAILY, Disp: 1 each, Rfl: 11    FreeStyle Shakir 2 [place] misc, Use as instructed, Disp: 1 each, Rfl: 0    Physical Exam  Constitutional:        Appearance: She is normal weight. She is ill-appearing (Chronically).      Comments: umkempt   HENT:      Head: Normocephalic and atraumatic.      Nose: Nose normal.      Mouth/Throat:      Mouth: Mucous membranes are moist.      Pharynx: Oropharynx is clear.   Eyes:      Conjunctiva/sclera: Conjunctivae normal.      Pupils: Pupils are equal, round, and reactive to light.   Cardiovascular:      Rate and Rhythm: Normal rate. Rhythm irregular.      Pulses: Normal pulses.      Heart sounds: Normal heart sounds.      Comments: Right upper chest wall with dialysis catheter intact.  Trace lower extremity edema  Pulmonary:      Effort: Pulmonary effort is normal.      Breath sounds: Examination of the right-middle field reveals decreased breath sounds. Examination of the left-middle field reveals decreased breath sounds. Examination of the right-lower field reveals decreased breath sounds. Examination of the left-lower field reveals decreased breath sounds. Decreased breath sounds present.   Abdominal:      General: Bowel sounds are normal.      Palpations: Abdomen is soft.   Musculoskeletal:         General: Normal range of motion.      Cervical back: Normal range of motion and neck supple.   Skin:     General: Skin is warm and dry.      Capillary Refill: Capillary refill takes less than 2 seconds.      Findings: Erythema, lesion and wound present.             Comments: Wound to left lower leg with irregular borders with tan/brownish drainage on old dressing, borders pink, center of wound with yellow eschar.   Small circluar rounded red open area with scant bleeding not to inner left leg. Back of right leg above ankle with a round scabbed area   Neurological:      Mental Status: She is alert and oriented to person, place, and time.      Motor: Weakness present.      Gait: Gait abnormal.   Psychiatric:         Mood and Affect: Mood normal.         Behavior: Behavior normal.         Thought Content: Thought content normal.          Judgment: Judgment normal.       Patient Active Problem List   Diagnosis    Hypothyroidism    Acute on chronic diastolic heart failure (Multi)    Altered mental status    Anxiety    Chronic fatigue    Constipation by delayed colonic transit    Chronic obstructive pulmonary disease (Multi)    Type 2 diabetes mellitus (Multi)    Diabetic renal disease (Multi)    Gastroesophageal reflux disease    History of renal cell carcinoma    Hypertension    Peripheral vascular disease (CMS-HCC)    Leukocytosis    Lung nodule    Major depressive disorder, single episode, unspecified    Osteoarthritis    Paroxysmal atrial fibrillation (Multi)    Primary insomnia    Stage 3b chronic kidney disease (CKD) (Multi)    Trouble walking    Hyperlipidemia, unspecified    ESRD on dialysis (Multi)    ANDREA (acute kidney injury) (CMS-HCC)    ASHD (arteriosclerotic heart disease)    At risk for falling    Essential tremor    Vitamin D deficiency    Acute on chronic respiratory failure with hypoxia (Multi)    Pulmonary hypertension (Multi)    Pulmonary edema ([facility])    Influenza B    History of right nephrectomy    Cognitive impairment    Unspecified open wound, right lower leg, initial encounter    Chronic kidney disease, stage V (Multi)    Cellulitis of right lower extremity    Open wound of right forearm    Open wound of right upper arm    Open wound of left lower leg    Abrasion of buttock, infected    Acute cystitis with hematuria    Complication associated with dialysis catheter    Cellulitis    Iron deficiency anemia due to chronic blood loss    Cerebrovascular accident (CVA) (Multi)         Assessment/Plan   Diagnoses and all orders for this visit:  Primary hypertension  Comments:  Continue Metoporlol  Type 2 diabetes mellitus with hyperglycemia, with long-term current use of insulin (Multi)  Comments:  Monitor blood sugars usign free style shakir  Adjust  insulin based on blood sugar  Orders:  -     FreeStyle Shakir 14 Day Sensor  kit; USE AS DIRECTED TO CHECK SUGARS 3 TO 4 TIMES DAILY  -     FreeStyle Shakir 2 [place] misc; Use as instructed  Open wound of left lower leg, subsequent encounter  Comments:  Pt to follow with wound NP Deanna Villa for wound management  Mixed hyperlipidemia  Comments:  Continue Lipitor  Acquired hypothyroidism  Comments:  Continue Levothyroxine 250 mcg po daily     Follow up with Poly [facility] in 1 month due to high risk rehospitalization.     Lilly High, APRN-CNP

## 2024-05-30 PROCEDURE — 1090000002 HH PPS REVENUE DEBIT

## 2024-05-30 PROCEDURE — 1090000001 HH PPS REVENUE CREDIT

## 2024-05-31 ENCOUNTER — HOME CARE VISIT (OUTPATIENT)
Dept: HOME HEALTH SERVICES | Facility: HOME HEALTH | Age: 69
End: 2024-05-31
Payer: MEDICARE

## 2024-05-31 VITALS
RESPIRATION RATE: 18 BRPM | DIASTOLIC BLOOD PRESSURE: 70 MMHG | TEMPERATURE: 97.8 F | HEART RATE: 80 BPM | OXYGEN SATURATION: 96 % | SYSTOLIC BLOOD PRESSURE: 135 MMHG

## 2024-05-31 PROCEDURE — G0299 HHS/HOSPICE OF RN EA 15 MIN: HCPCS | Mod: HHH

## 2024-05-31 PROCEDURE — 1090000001 HH PPS REVENUE CREDIT

## 2024-05-31 PROCEDURE — 1090000002 HH PPS REVENUE DEBIT

## 2024-05-31 SDOH — ECONOMIC STABILITY: HOUSING INSECURITY: EVIDENCE OF SMOKING MATERIAL: 1

## 2024-05-31 SDOH — HEALTH STABILITY: MENTAL HEALTH: SMOKING IN HOME: 1

## 2024-05-31 ASSESSMENT — ENCOUNTER SYMPTOMS
LAST BOWEL MOVEMENT: 66990
DYSPNEA ACTIVITY LEVEL: AT REST
DENIES PAIN: 1
SKIN LESIONS: 1
SHORTNESS OF BREATH: 1
APPETITE LEVEL: GOOD
PERSON REPORTING PAIN: PATIENT
CHANGE IN APPETITE: UNCHANGED
MUSCLE WEAKNESS: 1

## 2024-05-31 ASSESSMENT — ACTIVITIES OF DAILY LIVING (ADL)
AMBULATION ASSISTANCE: STAND BY ASSIST
CURRENT_FUNCTION: STAND BY ASSIST

## 2024-06-01 ENCOUNTER — HOME CARE VISIT (OUTPATIENT)
Dept: HOME HEALTH SERVICES | Facility: HOME HEALTH | Age: 69
End: 2024-06-01
Payer: MEDICARE

## 2024-06-01 PROCEDURE — 1090000001 HH PPS REVENUE CREDIT

## 2024-06-01 PROCEDURE — 1090000002 HH PPS REVENUE DEBIT

## 2024-06-01 PROCEDURE — G0152 HHCP-SERV OF OT,EA 15 MIN: HCPCS | Mod: HHH

## 2024-06-02 VITALS
DIASTOLIC BLOOD PRESSURE: 62 MMHG | RESPIRATION RATE: 18 BRPM | SYSTOLIC BLOOD PRESSURE: 118 MMHG | OXYGEN SATURATION: 98 % | TEMPERATURE: 97.3 F | HEART RATE: 81 BPM

## 2024-06-02 PROCEDURE — 1090000002 HH PPS REVENUE DEBIT

## 2024-06-02 PROCEDURE — 1090000001 HH PPS REVENUE CREDIT

## 2024-06-02 ASSESSMENT — ACTIVITIES OF DAILY LIVING (ADL)
BATHING_CURRENT_FUNCTION: INDEPENDENT
BATHING ASSESSED: 1
TOILETING: 1
DRESSING_LB_CURRENT_FUNCTION: INDEPENDENT
TOILETING: INDEPENDENT
WASHING_LB_CURRENT_FUNCTION: INDEPENDENT

## 2024-06-02 ASSESSMENT — PAIN SCALES - PAIN ASSESSMENT IN ADVANCED DEMENTIA (PAINAD)
FACIALEXPRESSION: 0
NEGVOCALIZATION: 0 - NONE.
NEGVOCALIZATION: 0
FACIALEXPRESSION: 0 - SMILING OR INEXPRESSIVE.
TOTALSCORE: 0
BODYLANGUAGE: 0
BREATHING: 0
CONSOLABILITY: 0 - NO NEED TO CONSOLE.
CONSOLABILITY: 0
BODYLANGUAGE: 0 - RELAXED.

## 2024-06-02 ASSESSMENT — ENCOUNTER SYMPTOMS
DENIES PAIN: 1
PERSON REPORTING PAIN: PATIENT

## 2024-06-03 PROCEDURE — 1090000002 HH PPS REVENUE DEBIT

## 2024-06-03 PROCEDURE — 1090000001 HH PPS REVENUE CREDIT

## 2024-06-04 PROCEDURE — 1090000001 HH PPS REVENUE CREDIT

## 2024-06-04 PROCEDURE — 1090000002 HH PPS REVENUE DEBIT

## 2024-06-05 ENCOUNTER — HOME CARE VISIT (OUTPATIENT)
Dept: HOME HEALTH SERVICES | Facility: HOME HEALTH | Age: 69
End: 2024-06-05
Payer: MEDICARE

## 2024-06-05 VITALS
RESPIRATION RATE: 18 BRPM | HEART RATE: 79 BPM | SYSTOLIC BLOOD PRESSURE: 152 MMHG | DIASTOLIC BLOOD PRESSURE: 72 MMHG | TEMPERATURE: 97.3 F

## 2024-06-05 PROCEDURE — 1090000002 HH PPS REVENUE DEBIT

## 2024-06-05 PROCEDURE — 1090000001 HH PPS REVENUE CREDIT

## 2024-06-05 PROCEDURE — G0299 HHS/HOSPICE OF RN EA 15 MIN: HCPCS | Mod: HHH

## 2024-06-05 PROCEDURE — G0151 HHCP-SERV OF PT,EA 15 MIN: HCPCS | Mod: HHH

## 2024-06-05 PROCEDURE — 0023 HH SOC

## 2024-06-05 SDOH — ECONOMIC STABILITY: HOUSING INSECURITY: EVIDENCE OF SMOKING MATERIAL: 1

## 2024-06-05 SDOH — HEALTH STABILITY: MENTAL HEALTH: SMOKING IN HOME: 1

## 2024-06-05 ASSESSMENT — ACTIVITIES OF DAILY LIVING (ADL)
CURRENT_FUNCTION: STAND BY ASSIST
AMBULATION ASSISTANCE: 1
AMBULATION ASSISTANCE: STAND BY ASSIST
TRANSPORTATION ASSESSED: 1
AMBULATION ASSISTANCE: MINIMUM ASSIST

## 2024-06-05 ASSESSMENT — ENCOUNTER SYMPTOMS
PAIN LOCATION - PAIN SEVERITY: 0/10
PAIN SEVERITY GOAL: 0/10
LAST BOWEL MOVEMENT: 66996
PERSON REPORTING PAIN: PATIENT
LOWEST PAIN SEVERITY IN PAST 24 HOURS: 0/10
SKIN LESIONS: 1
PAIN: 1
APPETITE LEVEL: FAIR
CHANGE IN APPETITE: UNCHANGED
LOWER EXTREMITY EDEMA: 1
HIGHEST PAIN SEVERITY IN PAST 24 HOURS: 7/10
PERSON REPORTING PAIN: PATIENT
SUBJECTIVE PAIN PROGRESSION: GRADUALLY IMPROVING
PAIN LOCATION: RIGHT LEG
DENIES PAIN: 1

## 2024-06-05 NOTE — HOME HEALTH
PRIOR HISTORY:  Hospitalized 4 29 24 for baceterial lnfection to legs. Returned home Thursday, May 2nd, 2024, Fell May 3rd, 2024.   Fall tore the port out of the right side of the chest. Tore my legs and arms, went back to the hospital to get leg wounds dressed but also to get the port fixed as well. She was in [place] for my legs. Then [place] to get port fixed then [place] for this. Home since at least Saturday. I spoke with patient who was at home on Sunday morning, 5 5 24. Multiple falls this year. More than 12. I was in the hospital for a month, then [facility] for 3 months then came home on a Friday, following Thursday I fell and I went back to the hospital.     2 falls since my last visit.      TUG 52 seconds.    Patient challenged to walk as far as she is able.  She responded by taking her 4 wheeled rollator walker and walked about 150 feet within her narrow residence, taking about 4.5 minutes to do so.  Good response really vs. my previous visit.    2 minutes each  1.  Ankle pumps in sitting  2.  Full arc quads in sitting  3.  Marching in sitting.

## 2024-06-06 PROCEDURE — 1090000001 HH PPS REVENUE CREDIT

## 2024-06-06 PROCEDURE — 1090000002 HH PPS REVENUE DEBIT

## 2024-06-07 ENCOUNTER — TELEPHONE (OUTPATIENT)
Dept: PRIMARY CARE | Facility: CLINIC | Age: 69
End: 2024-06-07
Payer: MEDICARE

## 2024-06-07 ENCOUNTER — HOME CARE VISIT (OUTPATIENT)
Dept: HOME HEALTH SERVICES | Facility: HOME HEALTH | Age: 69
End: 2024-06-07
Payer: MEDICARE

## 2024-06-07 VITALS
TEMPERATURE: 97.2 F | BODY MASS INDEX: 25.35 KG/M2 | DIASTOLIC BLOOD PRESSURE: 110 MMHG | HEART RATE: 84 BPM | WEIGHT: 157 LBS | SYSTOLIC BLOOD PRESSURE: 180 MMHG | OXYGEN SATURATION: 95 % | RESPIRATION RATE: 16 BRPM

## 2024-06-07 PROCEDURE — 1090000001 HH PPS REVENUE CREDIT

## 2024-06-07 PROCEDURE — 1090000002 HH PPS REVENUE DEBIT

## 2024-06-07 PROCEDURE — G0299 HHS/HOSPICE OF RN EA 15 MIN: HCPCS | Mod: HHH

## 2024-06-07 SDOH — ECONOMIC STABILITY: HOUSING INSECURITY
HOME SAFETY: PATIENT WAS CHECKING A BOWL WHEN SN ENTERED, STATES "IT WORKS"  STATES THAT SHE PUT DISH SOAP IN A BOWL OF WATER TO ATTRACT FLEAS AND IT IS WORKING.

## 2024-06-07 SDOH — HEALTH STABILITY: MENTAL HEALTH: SMOKING IN HOME: 1

## 2024-06-07 ASSESSMENT — PAIN SCALES - PAIN ASSESSMENT IN ADVANCED DEMENTIA (PAINAD)
BODYLANGUAGE: 0
BODYLANGUAGE: 0 - RELAXED.
NEGVOCALIZATION: 0 - NONE.
FACIALEXPRESSION: 0
BREATHING: 0
CONSOLABILITY: 0 - NO NEED TO CONSOLE.
TOTALSCORE: 0
CONSOLABILITY: 0
FACIALEXPRESSION: 0 - SMILING OR INEXPRESSIVE.
NEGVOCALIZATION: 0

## 2024-06-07 ASSESSMENT — ACTIVITIES OF DAILY LIVING (ADL)
CURRENT_FUNCTION: STAND BY ASSIST
AMBULATION ASSISTANCE: STAND BY ASSIST

## 2024-06-07 ASSESSMENT — ENCOUNTER SYMPTOMS
DENIES PAIN: 1
CHANGE IN APPETITE: VARYING
PERSON REPORTING PAIN: PATIENT
SKIN LESIONS: 1
MUSCLE WEAKNESS: 1

## 2024-06-07 NOTE — Clinical Note
Thank you  I just wanted documentation that i did report her blood pressure. and you heard her say she understood to call you.  I even told her again before I left.  She said your office never called her, I reinstructed that she was to call.          ----- Message -----  From: GREGORIA Lamar  Sent: 6/7/2024   8:14 PM EDT  To: Cassidy Veliz RN      Noted. Poly is on vacation and I am one of the NPs covering for her. I appreciate this update, our office will follow up.   ----- Message -----  From: Cassidy Veliz RN  Sent: 6/7/2024   8:00 PM EDT  To: GREGORIA King; *    Hello    When I visited this patient today, her BP was elevated at 180/100.  She had not taken her meds yet as she just took her synthroid.  She also just smoked a cigarette and drank a cup of coffee.  She is denying head ache, dizziness- states feels like she normally does. Recheck after wound care, BP remained the same.  I called [facility] office, she is off today- they took the information and stated that they would give Poly and her supervisor the message.  Patient was instructed to check BP an hour after her meds and call the office again, she verbalized understanding.  Prior to leaving her home, I once again instructed her to take her BP and call Poly office, she said she would.  I attempted to call between patients and phone line was busy.  I reached her when I got home.  She states she doesnt know what it was off hand and that it was good- her friend was over when she did it.  She did not report this to Poly's office, she states that she forgot.  Instructed that if she gets dizzy, headache, blurred vision, chest pain, increased SOB to call 911.

## 2024-06-07 NOTE — Clinical Note
Thank you for the update.     ----- Message -----  From: Cassidy Veliz RN  Sent: 6/7/2024   8:00 PM EDT  To: CARLENE King-CNP; *      Hello    When I visited this patient today, her BP was elevated at 180/100.  She had not taken her meds yet as she just took her synthroid.  She also just smoked a cigarette and drank a cup of coffee.  She is denying head ache, dizziness- states feels like she normally does. Recheck after wound care, BP remained the same.  I called [facility] office, she is off today- they took the information and stated that they would give Poly and her supervisor the message.  Patient was instructed to check BP an hour after her meds and call the office again, she verbalized understanding.  Prior to leaving her home, I once again instructed her to take her BP and call Poly office, she said she would.  I attempted to call between patients and phone line was busy.  I reached her when I got home.  She states she doesnt know what it was off hand and that it was good- her friend was over when she did it.  She did not report this to Poly's office, she states that she forgot.  Instructed that if she gets dizzy, headache, blurred vision, chest pain, increased SOB to call 911.

## 2024-06-07 NOTE — TELEPHONE ENCOUNTER
Per nurse Cassidy pt's BP today 180/100. Pt had dialysis yesterday. Per Cassidy she has not taken her medications yet today and had a cup of coffee and smoke a cigarette. Pt denies headache, lightheadedness, dizziness, or any other symptoms at this time. Cassidy states pt feels weak but this is not unusual for pt. While I was on speaker phone I instructed the patient to call back after she had taken her medications and rechecked her BP. Pt voiced understanding.

## 2024-06-08 PROCEDURE — 1090000001 HH PPS REVENUE CREDIT

## 2024-06-08 PROCEDURE — 1090000002 HH PPS REVENUE DEBIT

## 2024-06-09 PROCEDURE — 1090000001 HH PPS REVENUE CREDIT

## 2024-06-09 PROCEDURE — 1090000002 HH PPS REVENUE DEBIT

## 2024-06-10 ENCOUNTER — DOCUMENTATION (OUTPATIENT)
Dept: PRIMARY CARE | Facility: CLINIC | Age: 69
End: 2024-06-10
Payer: MEDICARE

## 2024-06-10 ENCOUNTER — OFFICE VISIT (OUTPATIENT)
Dept: VASCULAR SURGERY | Facility: CLINIC | Age: 69
End: 2024-06-10
Payer: MEDICARE

## 2024-06-10 VITALS — SYSTOLIC BLOOD PRESSURE: 204 MMHG | DIASTOLIC BLOOD PRESSURE: 104 MMHG | HEART RATE: 72 BPM

## 2024-06-10 DIAGNOSIS — I48.0 PAROXYSMAL ATRIAL FIBRILLATION (MULTI): Primary | ICD-10-CM

## 2024-06-10 DIAGNOSIS — Z99.2 CKD (CHRONIC KIDNEY DISEASE) STAGE V REQUIRING CHRONIC DIALYSIS (MULTI): Primary | ICD-10-CM

## 2024-06-10 DIAGNOSIS — N18.6 CKD (CHRONIC KIDNEY DISEASE) STAGE V REQUIRING CHRONIC DIALYSIS (MULTI): Primary | ICD-10-CM

## 2024-06-10 LAB
ATRIAL RATE: 57 BPM
P AXIS: 50 DEGREES
P OFFSET: 167 MS
P ONSET: 122 MS
PR INTERVAL: 190 MS
Q ONSET: 217 MS
QRS COUNT: 9 BEATS
QRS DURATION: 122 MS
QT INTERVAL: 480 MS
QTC CALCULATION(BAZETT): 467 MS
QTC FREDERICIA: 472 MS
R AXIS: -42 DEGREES
T AXIS: 79 DEGREES
T OFFSET: 457 MS
VENTRICULAR RATE: 57 BPM

## 2024-06-10 PROCEDURE — 1123F ACP DISCUSS/DSCN MKR DOCD: CPT | Performed by: SURGERY

## 2024-06-10 PROCEDURE — 1090000001 HH PPS REVENUE CREDIT

## 2024-06-10 PROCEDURE — 3062F POS MACROALBUMINURIA REV: CPT | Performed by: SURGERY

## 2024-06-10 PROCEDURE — 99205 OFFICE O/P NEW HI 60 MIN: CPT | Performed by: SURGERY

## 2024-06-10 PROCEDURE — 3080F DIAST BP >= 90 MM HG: CPT | Performed by: SURGERY

## 2024-06-10 PROCEDURE — 99215 OFFICE O/P EST HI 40 MIN: CPT | Performed by: SURGERY

## 2024-06-10 PROCEDURE — 3077F SYST BP >= 140 MM HG: CPT | Performed by: SURGERY

## 2024-06-10 PROCEDURE — 1157F ADVNC CARE PLAN IN RCRD: CPT | Performed by: SURGERY

## 2024-06-10 PROCEDURE — 1111F DSCHRG MED/CURRENT MED MERGE: CPT | Performed by: SURGERY

## 2024-06-10 PROCEDURE — 1159F MED LIST DOCD IN RCRD: CPT | Performed by: SURGERY

## 2024-06-10 PROCEDURE — 1090000002 HH PPS REVENUE DEBIT

## 2024-06-10 PROCEDURE — 1126F AMNT PAIN NOTED NONE PRSNT: CPT | Performed by: SURGERY

## 2024-06-10 RX ORDER — AMIODARONE HYDROCHLORIDE 200 MG/1
200 TABLET ORAL
Qty: 90 TABLET | Refills: 1 | Status: SHIPPED | OUTPATIENT
Start: 2024-06-10

## 2024-06-10 ASSESSMENT — PATIENT HEALTH QUESTIONNAIRE - PHQ9
1. LITTLE INTEREST OR PLEASURE IN DOING THINGS: NOT AT ALL
SUM OF ALL RESPONSES TO PHQ9 QUESTIONS 1 AND 2: 0
2. FEELING DOWN, DEPRESSED OR HOPELESS: NOT AT ALL

## 2024-06-10 ASSESSMENT — VISUAL ACUITY: OU: 1

## 2024-06-10 ASSESSMENT — ENCOUNTER SYMPTOMS
OCCASIONAL FEELINGS OF UNSTEADINESS: 0
DEPRESSION: 0
LOSS OF SENSATION IN FEET: 0

## 2024-06-10 ASSESSMENT — PAIN SCALES - GENERAL: PAINLEVEL: 0-NO PAIN

## 2024-06-10 NOTE — PROGRESS NOTES
Vascular Surgery Consultation, History, Physical    Daphne Morris is a 69 y.o. year old female patient.   History: Patient is here for evaluation for dialysis access.  She is ambidextrous but this is a cultural artifact of the time when she was punished for using her left hand.  She prefers using her left hand.  She has been dialyzing through a right tunneled internal jugular vein catheter.  Bedside ultrasound reveals the superficial veins in the right arm to be quite small.  She also has crepey skin and fat.  She denies any right arm symptoms.  She denies shortness of breath.    Past Medical History:   Diagnosis Date    Anal fissure 10/12/2023    Anemia     ASHD (arteriosclerotic heart disease)     At risk for falls     Atrial fibrillation (Multi)     CHF (congestive heart failure) (Multi)     CKD (chronic kidney disease)     COPD (chronic obstructive pulmonary disease) (Multi)     CVA (cerebral vascular accident) (Multi)     - right-sided weakness    Depression     Diabetes mellitus (Multi)     GERD (gastroesophageal reflux disease)     H/O pleural effusion     Hyperlipidemia     Hypertension     Hypothyroidism     Hypoxia     Impacted cerumen, left ear     Impacted cerumen of left ear    Noncompliance     Osteoarthritis     Perianal dermatitis 10/12/2023    Personal history of other diseases of the respiratory system     History of acute bronchitis    PVD (peripheral vascular disease) (CMS-HCC)     Renal carcinoma, right (Multi)     s/p partial nephrectomy 2021    Respiratory failure (Multi)     TIA (transient ischemic attack)     Vasculitis (CMS-HCC) 10/12/2023    Weakness        Past Surgical History:   Procedure Laterality Date    ANKLE SURGERY          CARDIAC CATHETERIZATION N/A 2024    Procedure: Right Heart Cath;  Surgeon: Nicholas Antonio MD;  Location: [facility];  Service: Cardiovascular;  Laterality: N/A;    CATARACT EXTRACTION Right     2019     SECTION,  CLASSIC      MR CHEST ANGIO W AND WO IV CONTRAST  08/22/2023    MR CHEST ANGIO W AND WO IV CONTRAST 8/22/2023 [facility] MRI    MR HEAD ANGIO WO IV CONTRAST  04/06/2021    MR HEAD ANGIO WO IV CONTRAST [facility]    NEPHRECTOMY  08/04/2021    SHOULDER SURGERY      2009       Active Ambulatory Problems     Diagnosis Date Noted    Hypothyroidism 10/03/2023    Acute on chronic diastolic heart failure (Multi) 10/12/2023    Altered mental status 10/12/2023    Anxiety 10/12/2023    Chronic fatigue 10/12/2023    Constipation by delayed colonic transit 10/12/2023    Chronic obstructive pulmonary disease (Multi) 10/12/2023    Type 2 diabetes mellitus (Multi) 10/12/2023    Diabetic renal disease (Multi) 10/12/2023    Gastroesophageal reflux disease 10/12/2023    History of renal cell carcinoma 10/12/2023    Hypertension 10/12/2023    Peripheral vascular disease (CMS-HCC) 10/12/2023    Leukocytosis 10/12/2023    Lung nodule 10/12/2023    Major depressive disorder, single episode, unspecified 10/12/2023    Osteoarthritis 10/12/2023    Paroxysmal atrial fibrillation (Multi) 10/12/2023    Primary insomnia 10/12/2023    Stage 3b chronic kidney disease (CKD) (Multi) 10/12/2023    Trouble walking 10/12/2023    Hyperlipidemia, unspecified 07/26/2023    ESRD on dialysis (Multi) 03/14/2024    ANDREA (acute kidney injury) (CMS-[facility]) 03/29/2024    ASHD (arteriosclerotic heart disease) 03/29/2024    At risk for falling 03/29/2024    Essential tremor 03/29/2024    Vitamin D deficiency 03/29/2024    Acute on chronic respiratory failure with hypoxia (Multi) 03/29/2024    Pulmonary hypertension (Multi) 03/29/2024    Pulmonary edema ([facility]-HCC) 03/29/2024    Influenza B 03/29/2024    History of right nephrectomy 03/29/2024    Cognitive impairment 03/29/2024    Unspecified open wound, right lower leg, initial encounter 04/29/2024    Chronic kidney disease, stage V (Multi) 04/29/2024    Cellulitis of right lower extremity 04/29/2024    Open  wound of right forearm 04/29/2024    Open wound of right upper arm 04/29/2024    Open wound of left lower leg 04/29/2024    Abrasion of buttock, infected 04/29/2024    Acute cystitis with hematuria 04/29/2024    Complication associated with dialysis catheter 05/02/2024    Cellulitis 05/15/2024    Iron deficiency anemia due to chronic blood loss 05/11/2023    Cerebrovascular accident (CVA) (Multi) 11/16/2022     Resolved Ambulatory Problems     Diagnosis Date Noted    Anal fissure 10/12/2023    Hypertensive heart and renal disease with (congestive) heart failure (Multi) 10/12/2023    Vasculitis (CMS-HCC) 10/12/2023    Perianal dermatitis 10/12/2023     Past Medical History:   Diagnosis Date    Anemia     At risk for falls     Atrial fibrillation (Multi)     CHF (congestive heart failure) (Multi)     CKD (chronic kidney disease)     COPD (chronic obstructive pulmonary disease) (Multi)     CVA (cerebral vascular accident) (Multi)     Depression     Diabetes mellitus (Multi)     GERD (gastroesophageal reflux disease)     H/O pleural effusion     Hyperlipidemia     Hypoxia     Impacted cerumen, left ear     Noncompliance     Personal history of other diseases of the respiratory system     PVD (peripheral vascular disease) (CMS-[facility])     Renal carcinoma, right (Multi)     Respiratory failure (Multi)     TIA (transient ischemic attack)     Weakness        Review of Systems   Constitutional: Negative.    HENT: Negative.     Eyes: Negative.    Respiratory: Negative.     Cardiovascular: Negative.    Gastrointestinal: Negative.    Endocrine: Negative.    Genitourinary: Negative.    Musculoskeletal: Negative.    Skin: Negative.    Allergic/Immunologic: Negative.    Neurological: Negative.    Hematological: Negative.    Psychiatric/Behavioral: Negative.       Allergies   Allergen Reactions    Bee Venom Protein (Honey Bee) Swelling    Citalopram Hives, Other, Rash and Nausea/vomiting     Facial breakout, blisters, and rash     Codeine Headache, Other and Itching     Migraines    Influenza Virus Vaccines Hives    Latex Other     blisters    Latex, Natural Rubber Other     blisters    Lisinopril Swelling and Nausea/vomiting     Facial swelling    Penicillins Swelling, Headache and Unknown    Adhesive Tape-Silicones Other     blisters    Amoxicillin Swelling and Rash    Doxycycline Rash and Unknown    Oseltamivir Rash and Nausea/vomiting    Phenyleph-Min Oil-Petrolatum Other    Phenyleph-Shark Oil-Glyc-Pet Rash    Phenylephrine Nausea/vomiting    Prednisone Other and Nausea/vomiting     Yeast infection    Tuberculin Ppd Unknown    Xylometazoline Unknown       Vitals:   Visit Vitals  BP (!) 204/104 Comment: pt notified to follow up with PCP regarding elevated BP   Pulse 72     Physical Exam:   Vascular Physical Exam  HENT:      Head: Normocephalic and atraumatic.      Nose: Nose normal.      Mouth/Throat:      Mouth: Mucous membranes are moist.   Eyes:      General: Vision grossly intact.      Pupils: Pupils are equal, round, and reactive to light.   Cardiovascular:      Rate and Rhythm: Normal rate and regular rhythm.      Pulses:           Radial pulses are 2+ on the right side and 2+ on the left side.        Brachial pulses are 2+ on the right side and 2+ on the left side.     Jonathan test results: right palmar arch incomplete.  Pulmonary:      Effort: Pulmonary effort is normal.      Breath sounds: Normal breath sounds.   Abdominal:      General: Abdomen is protuberant. Bowel sounds are normal.      Palpations: Abdomen is soft.   Musculoskeletal:      Neck: Full passive range of motion without pain and normal range of motion.   Skin:     General: Skin is dry.   Neurological:      General: No focal deficit present.      Mental Status: She is alert.   Psychiatric:         Mood and Affect: Mood normal.         Behavior: Behavior normal.         Labs:  LABS:  CBC with Differential:    Lab Results   Component Value Date    WBC 9.6 05/20/2024     RBC 3.28 (L) 05/20/2024    HGB 9.2 (L) 05/20/2024    HCT 32.4 (L) 05/20/2024     05/20/2024    MCV 99 05/20/2024    MCH 28.0 05/20/2024    MCHC 28.4 (L) 05/20/2024    RDW 16.6 (H) 05/20/2024    NRBC 0.2 (H) 05/20/2024    LYMPHOPCT 12.3 05/14/2024    MONOPCT 10.2 05/14/2024    EOSPCT 8.9 05/14/2024    BASOPCT 0.8 05/14/2024    MONOSABS 0.97 05/14/2024    LYMPHSABS 1.17 (L) 05/14/2024    EOSABS 0.85 (H) 05/14/2024    BASOSABS 0.08 05/14/2024     CMP:    Lab Results   Component Value Date     05/20/2024    K 4.8 05/20/2024     05/20/2024    CO2 29 05/20/2024    BUN 29 (H) 05/20/2024    CREATININE 2.97 (H) 05/20/2024    GLUCOSE 217 (H) 05/20/2024    PROT 6.1 (L) 05/14/2024    CALCIUM 7.9 (L) 05/20/2024    BILITOT 0.4 05/14/2024    ALKPHOS 93 05/14/2024    AST 20 05/14/2024    ALT 7 05/14/2024     BMP:    Lab Results   Component Value Date     05/20/2024    K 4.8 05/20/2024     05/20/2024    CO2 29 05/20/2024    BUN 29 (H) 05/20/2024    CREATININE 2.97 (H) 05/20/2024    CALCIUM 7.9 (L) 05/20/2024    GLUCOSE 217 (H) 05/20/2024     Magnesium:  Lab Results   Component Value Date    MG 1.96 05/20/2024     Troponin:    Lab Results   Component Value Date    TROPHS 40 (H) 02/18/2024    TROPHS 43 (H) 02/18/2024    TROPHS 18 (H) 02/01/2024     BNP:   Lab Results   Component Value Date    BNP 1,443 (H) 02/21/2024       Lab Results   Component Value Date    INR 1.0 05/17/2024    PROTIME 11.0 05/17/2024    CHOL 128 04/27/2023    LDLF 59 04/27/2023    HDL 49.0 04/27/2023       Imaging: bedside ultrasound shows small cephalic and basilic veins      Assessment/Plan   Diagnoses and all orders for this visit:  CKD (chronic kidney disease) stage V requiring chronic dialysis (Multi)  -     Case Request Operating Room: Creation Arteriovenous Shunt

## 2024-06-10 NOTE — H&P (VIEW-ONLY)
Vascular Surgery Consultation, History, Physical    Daphne Morris is a 69 y.o. year old female patient.   History: Patient is here for evaluation for dialysis access.  She is ambidextrous but this is a cultural artifact of the time when she was punished for using her left hand.  She prefers using her left hand.  She has been dialyzing through a right tunneled internal jugular vein catheter.  Bedside ultrasound reveals the superficial veins in the right arm to be quite small.  She also has crepey skin and fat.  She denies any right arm symptoms.  She denies shortness of breath.    Past Medical History:   Diagnosis Date    Anal fissure 10/12/2023    Anemia     ASHD (arteriosclerotic heart disease)     At risk for falls     Atrial fibrillation (Multi)     CHF (congestive heart failure) (Multi)     CKD (chronic kidney disease)     COPD (chronic obstructive pulmonary disease) (Multi)     CVA (cerebral vascular accident) (Multi)     - right-sided weakness    Depression     Diabetes mellitus (Multi)     GERD (gastroesophageal reflux disease)     H/O pleural effusion     Hyperlipidemia     Hypertension     Hypothyroidism     Hypoxia     Impacted cerumen, left ear     Impacted cerumen of left ear    Noncompliance     Osteoarthritis     Perianal dermatitis 10/12/2023    Personal history of other diseases of the respiratory system     History of acute bronchitis    PVD (peripheral vascular disease) (CMS-HCC)     Renal carcinoma, right (Multi)     s/p partial nephrectomy 2021    Respiratory failure (Multi)     TIA (transient ischemic attack)     Vasculitis (CMS-HCC) 10/12/2023    Weakness        Past Surgical History:   Procedure Laterality Date    ANKLE SURGERY          CARDIAC CATHETERIZATION N/A 2024    Procedure: Right Heart Cath;  Surgeon: Nicholas Antonio MD;  Location: [facility] Cardiac Cath Lab;  Service: Cardiovascular;  Laterality: N/A;    CATARACT EXTRACTION Right     2019     SECTION,  CLASSIC      MR CHEST ANGIO W AND WO IV CONTRAST  08/22/2023    MR CHEST ANGIO W AND WO IV CONTRAST 8/22/2023 [facility] MRI    MR HEAD ANGIO WO IV CONTRAST  04/06/2021    MR HEAD ANGIO WO IV CONTRAST [facility]    NEPHRECTOMY  08/04/2021    SHOULDER SURGERY      2009       Active Ambulatory Problems     Diagnosis Date Noted    Hypothyroidism 10/03/2023    Acute on chronic diastolic heart failure (Multi) 10/12/2023    Altered mental status 10/12/2023    Anxiety 10/12/2023    Chronic fatigue 10/12/2023    Constipation by delayed colonic transit 10/12/2023    Chronic obstructive pulmonary disease (Multi) 10/12/2023    Type 2 diabetes mellitus (Multi) 10/12/2023    Diabetic renal disease (Multi) 10/12/2023    Gastroesophageal reflux disease 10/12/2023    History of renal cell carcinoma 10/12/2023    Hypertension 10/12/2023    Peripheral vascular disease (CMS-HCC) 10/12/2023    Leukocytosis 10/12/2023    Lung nodule 10/12/2023    Major depressive disorder, single episode, unspecified 10/12/2023    Osteoarthritis 10/12/2023    Paroxysmal atrial fibrillation (Multi) 10/12/2023    Primary insomnia 10/12/2023    Stage 3b chronic kidney disease (CKD) (Multi) 10/12/2023    Trouble walking 10/12/2023    Hyperlipidemia, unspecified 07/26/2023    ESRD on dialysis (Multi) 03/14/2024    ANDREA (acute kidney injury) (CMS-[facility]) 03/29/2024    ASHD (arteriosclerotic heart disease) 03/29/2024    At risk for falling 03/29/2024    Essential tremor 03/29/2024    Vitamin D deficiency 03/29/2024    Acute on chronic respiratory failure with hypoxia (Multi) 03/29/2024    Pulmonary hypertension (Multi) 03/29/2024    Pulmonary edema ([facility]-HCC) 03/29/2024    Influenza B 03/29/2024    History of right nephrectomy 03/29/2024    Cognitive impairment 03/29/2024    Unspecified open wound, right lower leg, initial encounter 04/29/2024    Chronic kidney disease, stage V (Multi) 04/29/2024    Cellulitis of right lower extremity 04/29/2024    Open  wound of right forearm 04/29/2024    Open wound of right upper arm 04/29/2024    Open wound of left lower leg 04/29/2024    Abrasion of buttock, infected 04/29/2024    Acute cystitis with hematuria 04/29/2024    Complication associated with dialysis catheter 05/02/2024    Cellulitis 05/15/2024    Iron deficiency anemia due to chronic blood loss 05/11/2023    Cerebrovascular accident (CVA) (Multi) 11/16/2022     Resolved Ambulatory Problems     Diagnosis Date Noted    Anal fissure 10/12/2023    Hypertensive heart and renal disease with (congestive) heart failure (Multi) 10/12/2023    Vasculitis (CMS-HCC) 10/12/2023    Perianal dermatitis 10/12/2023     Past Medical History:   Diagnosis Date    Anemia     At risk for falls     Atrial fibrillation (Multi)     CHF (congestive heart failure) (Multi)     CKD (chronic kidney disease)     COPD (chronic obstructive pulmonary disease) (Multi)     CVA (cerebral vascular accident) (Multi)     Depression     Diabetes mellitus (Multi)     GERD (gastroesophageal reflux disease)     H/O pleural effusion     Hyperlipidemia     Hypoxia     Impacted cerumen, left ear     Noncompliance     Personal history of other diseases of the respiratory system     PVD (peripheral vascular disease) (CMS-[facility])     Renal carcinoma, right (Multi)     Respiratory failure (Multi)     TIA (transient ischemic attack)     Weakness        Review of Systems   Constitutional: Negative.    HENT: Negative.     Eyes: Negative.    Respiratory: Negative.     Cardiovascular: Negative.    Gastrointestinal: Negative.    Endocrine: Negative.    Genitourinary: Negative.    Musculoskeletal: Negative.    Skin: Negative.    Allergic/Immunologic: Negative.    Neurological: Negative.    Hematological: Negative.    Psychiatric/Behavioral: Negative.       Allergies   Allergen Reactions    Bee Venom Protein (Honey Bee) Swelling    Citalopram Hives, Other, Rash and Nausea/vomiting     Facial breakout, blisters, and rash     Codeine Headache, Other and Itching     Migraines    Influenza Virus Vaccines Hives    Latex Other     blisters    Latex, Natural Rubber Other     blisters    Lisinopril Swelling and Nausea/vomiting     Facial swelling    Penicillins Swelling, Headache and Unknown    Adhesive Tape-Silicones Other     blisters    Amoxicillin Swelling and Rash    Doxycycline Rash and Unknown    Oseltamivir Rash and Nausea/vomiting    Phenyleph-Min Oil-Petrolatum Other    Phenyleph-Shark Oil-Glyc-Pet Rash    Phenylephrine Nausea/vomiting    Prednisone Other and Nausea/vomiting     Yeast infection    Tuberculin Ppd Unknown    Xylometazoline Unknown       Vitals:   Visit Vitals  BP (!) 204/104 Comment: pt notified to follow up with PCP regarding elevated BP   Pulse 72     Physical Exam:   Vascular Physical Exam  HENT:      Head: Normocephalic and atraumatic.      Nose: Nose normal.      Mouth/Throat:      Mouth: Mucous membranes are moist.   Eyes:      General: Vision grossly intact.      Pupils: Pupils are equal, round, and reactive to light.   Cardiovascular:      Rate and Rhythm: Normal rate and regular rhythm.      Pulses:           Radial pulses are 2+ on the right side and 2+ on the left side.        Brachial pulses are 2+ on the right side and 2+ on the left side.     Jonathan test results: right palmar arch incomplete.  Pulmonary:      Effort: Pulmonary effort is normal.      Breath sounds: Normal breath sounds.   Abdominal:      General: Abdomen is protuberant. Bowel sounds are normal.      Palpations: Abdomen is soft.   Musculoskeletal:      Neck: Full passive range of motion without pain and normal range of motion.   Skin:     General: Skin is dry.   Neurological:      General: No focal deficit present.      Mental Status: She is alert.   Psychiatric:         Mood and Affect: Mood normal.         Behavior: Behavior normal.         Labs:  LABS:  CBC with Differential:    Lab Results   Component Value Date    WBC 9.6 05/20/2024     RBC 3.28 (L) 05/20/2024    HGB 9.2 (L) 05/20/2024    HCT 32.4 (L) 05/20/2024     05/20/2024    MCV 99 05/20/2024    MCH 28.0 05/20/2024    MCHC 28.4 (L) 05/20/2024    RDW 16.6 (H) 05/20/2024    NRBC 0.2 (H) 05/20/2024    LYMPHOPCT 12.3 05/14/2024    MONOPCT 10.2 05/14/2024    EOSPCT 8.9 05/14/2024    BASOPCT 0.8 05/14/2024    MONOSABS 0.97 05/14/2024    LYMPHSABS 1.17 (L) 05/14/2024    EOSABS 0.85 (H) 05/14/2024    BASOSABS 0.08 05/14/2024     CMP:    Lab Results   Component Value Date     05/20/2024    K 4.8 05/20/2024     05/20/2024    CO2 29 05/20/2024    BUN 29 (H) 05/20/2024    CREATININE 2.97 (H) 05/20/2024    GLUCOSE 217 (H) 05/20/2024    PROT 6.1 (L) 05/14/2024    CALCIUM 7.9 (L) 05/20/2024    BILITOT 0.4 05/14/2024    ALKPHOS 93 05/14/2024    AST 20 05/14/2024    ALT 7 05/14/2024     BMP:    Lab Results   Component Value Date     05/20/2024    K 4.8 05/20/2024     05/20/2024    CO2 29 05/20/2024    BUN 29 (H) 05/20/2024    CREATININE 2.97 (H) 05/20/2024    CALCIUM 7.9 (L) 05/20/2024    GLUCOSE 217 (H) 05/20/2024     Magnesium:  Lab Results   Component Value Date    MG 1.96 05/20/2024     Troponin:    Lab Results   Component Value Date    TROPHS 40 (H) 02/18/2024    TROPHS 43 (H) 02/18/2024    TROPHS 18 (H) 02/01/2024     BNP:   Lab Results   Component Value Date    BNP 1,443 (H) 02/21/2024       Lab Results   Component Value Date    INR 1.0 05/17/2024    PROTIME 11.0 05/17/2024    CHOL 128 04/27/2023    LDLF 59 04/27/2023    HDL 49.0 04/27/2023       Imaging: bedside ultrasound shows small cephalic and basilic veins      Assessment/Plan   Diagnoses and all orders for this visit:  CKD (chronic kidney disease) stage V requiring chronic dialysis (Multi)  -     Case Request Operating Room: [facility] Arteriovenous Shunt

## 2024-06-10 NOTE — PROGRESS NOTES
House Calls CM follow up in home visit for medication management. Was getting ready for vascular surgeon appointment. Hydrofera blue dressing intact to RLE. Kerlex wrap reapplied per patient request. At last visit patient reported Metoprolol and Amiodarone were ready to be picked up at [facility] pharmacy. Patient was instructed at that time to add the 2 meds to weekly pill boxes and expressed understanding. Today, Metoprolol found in bag unopened. There was no Amiodarone refill. Patient took this morning's dose of both meds during visit. Requested refill of Amiodarone. Instructed her to add to pill box daily once picked up. Pill boxes filled for another 2 weeks. Will follow up with phone call to re-evaluate med compliance.

## 2024-06-11 PROBLEM — N18.6 CKD (CHRONIC KIDNEY DISEASE) STAGE V REQUIRING CHRONIC DIALYSIS (MULTI): Status: ACTIVE | Noted: 2024-06-10

## 2024-06-11 PROBLEM — Z99.2 CKD (CHRONIC KIDNEY DISEASE) STAGE V REQUIRING CHRONIC DIALYSIS (MULTI): Status: ACTIVE | Noted: 2024-06-10

## 2024-06-11 NOTE — TELEPHONE ENCOUNTER
Spoke to patient today. She states she didn't take BP meds x2 weeks because she didn't put them in her pill box when she received refill. She states the last two times she went to dialysis her BP has been fine. Pt denies any symptoms at this time.

## 2024-06-12 ENCOUNTER — HOME CARE VISIT (OUTPATIENT)
Dept: HOME HEALTH SERVICES | Facility: HOME HEALTH | Age: 69
End: 2024-06-12
Payer: MEDICARE

## 2024-06-13 ENCOUNTER — TELEPHONE (OUTPATIENT)
Dept: PRIMARY CARE | Facility: CLINIC | Age: 69
End: 2024-06-13
Payer: MEDICARE

## 2024-06-13 NOTE — TELEPHONE ENCOUNTER
Phoned patient to schedule a House Calls visit with Poly Diaz NP, spoke with patient and scheduled a visit on 6/25/24 at 1:30 pm.

## 2024-06-14 ENCOUNTER — HOME CARE VISIT (OUTPATIENT)
Dept: HOME HEALTH SERVICES | Facility: HOME HEALTH | Age: 69
End: 2024-06-14
Payer: MEDICARE

## 2024-06-14 VITALS
TEMPERATURE: 97.5 F | SYSTOLIC BLOOD PRESSURE: 150 MMHG | OXYGEN SATURATION: 96 % | RESPIRATION RATE: 16 BRPM | DIASTOLIC BLOOD PRESSURE: 86 MMHG | HEART RATE: 77 BPM

## 2024-06-14 PROCEDURE — G0300 HHS/HOSPICE OF LPN EA 15 MIN: HCPCS | Mod: HHH

## 2024-06-14 PROCEDURE — G0157 HHC PT ASSISTANT EA 15: HCPCS | Mod: CQ,HHH

## 2024-06-14 SDOH — ECONOMIC STABILITY: HOUSING INSECURITY: EVIDENCE OF SMOKING MATERIAL: 0

## 2024-06-14 SDOH — HEALTH STABILITY: MENTAL HEALTH: SMOKING IN HOME: 0

## 2024-06-14 ASSESSMENT — ENCOUNTER SYMPTOMS
CHANGE IN APPETITE: UNCHANGED
APPETITE LEVEL: GOOD
DENIES PAIN: 1
LAST BOWEL MOVEMENT: 67005

## 2024-06-15 VITALS
SYSTOLIC BLOOD PRESSURE: 160 MMHG | OXYGEN SATURATION: 99 % | DIASTOLIC BLOOD PRESSURE: 80 MMHG | TEMPERATURE: 98.2 F | HEART RATE: 72 BPM

## 2024-06-15 ASSESSMENT — ENCOUNTER SYMPTOMS: DENIES PAIN: 1

## 2024-06-17 ENCOUNTER — HOME CARE VISIT (OUTPATIENT)
Dept: HOME HEALTH SERVICES | Facility: HOME HEALTH | Age: 69
End: 2024-06-17
Payer: MEDICARE

## 2024-06-17 VITALS
SYSTOLIC BLOOD PRESSURE: 162 MMHG | HEART RATE: 67 BPM | OXYGEN SATURATION: 97 % | DIASTOLIC BLOOD PRESSURE: 66 MMHG | TEMPERATURE: 98.1 F

## 2024-06-17 PROCEDURE — G0157 HHC PT ASSISTANT EA 15: HCPCS | Mod: CQ,HHH

## 2024-06-17 ASSESSMENT — ENCOUNTER SYMPTOMS
PAIN LOCATION - PAIN FREQUENCY: CONSTANT
PAIN LOCATION - PAIN SEVERITY: 10/10
PAIN LOCATION - PAIN FREQUENCY: CONSTANT
PAIN LOCATION: RIGHT LEG
PAIN LOCATION - RELIEVING FACTORS: NOTHING
LOWEST PAIN SEVERITY IN PAST 24 HOURS: 10/10
PAIN LOCATION: CHEST
PAIN LOCATION - PAIN SEVERITY: 10/10
PAIN LOCATION - PAIN SEVERITY: 10/10
PAIN LOCATION - PAIN FREQUENCY: CONSTANT
PAIN LOCATION - PAIN QUALITY: ACHING, STABBING
PAIN LOCATION - RELIEVING FACTORS: NOTHING
PAIN LOCATION - PAIN SEVERITY: 10/10
HIGHEST PAIN SEVERITY IN PAST 24 HOURS: 10/10
PAIN LOCATION - PAIN QUALITY: ACHING, STABBING
PAIN LOCATION - RELIEVING FACTORS: NOTHING
PAIN LOCATION: LEFT LEG
PAIN LOCATION - PAIN QUALITY: ACHING, STABBING
PAIN LOCATION - PAIN QUALITY: ACHING, STABBING
PAIN LOCATION - PAIN FREQUENCY: CONSTANT
PAIN: 1
PAIN LOCATION - RELIEVING FACTORS: NOTHING
PAIN LOCATION: BACK

## 2024-06-17 NOTE — CASE COMMUNICATION
Patient states she fell Saturday at dialysis. As she was leaving she tripped on the rug fell back onto her buttocks. She refused to allow staff to call EMS. The staff checked her over thoroughly then she went home. Patient currently reporting 10/10 pain level in back, ribs, and bilateral legs but refusing to go to ED for evaluation.

## 2024-06-18 ENCOUNTER — APPOINTMENT (OUTPATIENT)
Dept: PREADMISSION TESTING | Facility: HOSPITAL | Age: 69
End: 2024-06-18
Payer: MEDICARE

## 2024-06-19 ENCOUNTER — HOME CARE VISIT (OUTPATIENT)
Dept: HOME HEALTH SERVICES | Facility: HOME HEALTH | Age: 69
End: 2024-06-19
Payer: MEDICARE

## 2024-06-19 VITALS
DIASTOLIC BLOOD PRESSURE: 88 MMHG | HEART RATE: 64 BPM | TEMPERATURE: 97.4 F | SYSTOLIC BLOOD PRESSURE: 164 MMHG | RESPIRATION RATE: 16 BRPM

## 2024-06-19 PROCEDURE — G0299 HHS/HOSPICE OF RN EA 15 MIN: HCPCS | Mod: HHH

## 2024-06-19 SDOH — HEALTH STABILITY: MENTAL HEALTH: SMOKING IN HOME: 1

## 2024-06-19 SDOH — ECONOMIC STABILITY: HOUSING INSECURITY: EVIDENCE OF SMOKING MATERIAL: 1

## 2024-06-19 ASSESSMENT — ENCOUNTER SYMPTOMS
SKIN LESIONS: 1
LOWER EXTREMITY EDEMA: 1
CHANGE IN APPETITE: UNCHANGED
MUSCLE WEAKNESS: 1
APPETITE LEVEL: GOOD
LAST BOWEL MOVEMENT: 67010

## 2024-06-19 ASSESSMENT — PAIN DESCRIPTION - PAIN TYPE: TYPE: ACUTE PAIN

## 2024-06-19 ASSESSMENT — ACTIVITIES OF DAILY LIVING (ADL): AMBULATION ASSISTANCE: STAND BY ASSIST

## 2024-06-21 ENCOUNTER — DOCUMENTATION (OUTPATIENT)
Dept: PRIMARY CARE | Facility: CLINIC | Age: 69
End: 2024-06-21
Payer: MEDICARE

## 2024-06-21 ENCOUNTER — HOME CARE VISIT (OUTPATIENT)
Dept: HOME HEALTH SERVICES | Facility: HOME HEALTH | Age: 69
End: 2024-06-21
Payer: MEDICARE

## 2024-06-21 NOTE — PROGRESS NOTES
Referral placed to [facility] for home care waiver assessment. Message left for patient to update.

## 2024-06-24 ENCOUNTER — DOCUMENTATION (OUTPATIENT)
Dept: PRIMARY CARE | Facility: CLINIC | Age: 69
End: 2024-06-24
Payer: MEDICARE

## 2024-06-24 ENCOUNTER — TELEPHONE (OUTPATIENT)
Dept: PRIMARY CARE | Facility: CLINIC | Age: 69
End: 2024-06-24
Payer: MEDICARE

## 2024-06-24 NOTE — PROGRESS NOTES
House Calls CM follow up in home visit. Missed several days of medications over past 2 weeks. Stated she is tired when she gets home from dialysis and forgets to take them. Has not been checking BG. No Freestyle Shakir sensors available. Instructed to call pharmacy. Sensors will be available for  tomorrow. Remains active with [facility]. Reminded patient she will be receiving phone call from [facility] for assessment. Will follow up in 2 weeks.

## 2024-06-25 ENCOUNTER — TELEPHONE (OUTPATIENT)
Dept: PRIMARY CARE | Facility: CLINIC | Age: 69
End: 2024-06-25
Payer: MEDICARE

## 2024-06-25 ENCOUNTER — ANESTHESIA EVENT (OUTPATIENT)
Dept: OPERATING ROOM | Facility: HOSPITAL | Age: 69
End: 2024-06-25
Payer: MEDICARE

## 2024-06-25 ENCOUNTER — APPOINTMENT (OUTPATIENT)
Dept: PRIMARY CARE | Facility: CLINIC | Age: 69
End: 2024-06-25
Payer: MEDICARE

## 2024-06-25 NOTE — TELEPHONE ENCOUNTER
Appointment w/ provider Poly Diaz NP to be rescheduled as provider arrived at patient home, no answer at door, 30 minutes spent in effort to establish contact, provider left home 1:05 pm.

## 2024-06-25 NOTE — TELEPHONE ENCOUNTER
Call placed to patient, patient was stuck at dialysis, ride forgot to pick her up.  Patient has dialysis T/TH/Sat House Calls will not schedule on dialysis days.     Appointment rescheduled for 7/17/24 with Poly Diaz NP, 10:30 am

## 2024-06-26 ENCOUNTER — TELEPHONE (OUTPATIENT)
Dept: TRANSPLANT | Facility: HOSPITAL | Age: 69
End: 2024-06-26
Payer: MEDICARE

## 2024-06-26 ENCOUNTER — APPOINTMENT (OUTPATIENT)
Dept: CARDIOLOGY | Facility: HOSPITAL | Age: 69
End: 2024-06-26
Payer: MEDICARE

## 2024-06-26 ENCOUNTER — HOSPITAL ENCOUNTER (OUTPATIENT)
Facility: HOSPITAL | Age: 69
LOS: 1 days | Discharge: HOME | End: 2024-06-27
Attending: SURGERY | Admitting: HOSPITALIST
Payer: MEDICARE

## 2024-06-26 ENCOUNTER — ANESTHESIA (OUTPATIENT)
Dept: OPERATING ROOM | Facility: HOSPITAL | Age: 69
End: 2024-06-26
Payer: MEDICARE

## 2024-06-26 DIAGNOSIS — J96.21 ACUTE ON CHRONIC RESPIRATORY FAILURE WITH HYPOXIA (MULTI): ICD-10-CM

## 2024-06-26 DIAGNOSIS — N18.6 CKD (CHRONIC KIDNEY DISEASE) STAGE V REQUIRING CHRONIC DIALYSIS (MULTI): ICD-10-CM

## 2024-06-26 DIAGNOSIS — I25.10 ASHD (ARTERIOSCLEROTIC HEART DISEASE): ICD-10-CM

## 2024-06-26 DIAGNOSIS — N18.6 END STAGE RENAL DISEASE (MULTI): ICD-10-CM

## 2024-06-26 DIAGNOSIS — Z99.2 CKD (CHRONIC KIDNEY DISEASE) STAGE V REQUIRING CHRONIC DIALYSIS (MULTI): ICD-10-CM

## 2024-06-26 DIAGNOSIS — N18.6 ESRD (END STAGE RENAL DISEASE) (MULTI): Primary | ICD-10-CM

## 2024-06-26 LAB
ALBUMIN SERPL-MCNC: 3 G/DL (ref 3.5–5)
ALP BLD-CCNC: 136 U/L (ref 35–125)
ALT SERPL-CCNC: 5 U/L (ref 5–40)
ANION GAP SERPL CALC-SCNC: 8 MMOL/L
AST SERPL-CCNC: 11 U/L (ref 5–40)
ATRIAL RATE: 75 BPM
BILIRUB SERPL-MCNC: <0.2 MG/DL (ref 0.1–1.2)
BUN SERPL-MCNC: 25 MG/DL (ref 8–25)
CALCIUM SERPL-MCNC: 8.4 MG/DL (ref 8.5–10.4)
CHLORIDE SERPL-SCNC: 100 MMOL/L (ref 97–107)
CO2 SERPL-SCNC: 29 MMOL/L (ref 24–31)
CREAT SERPL-MCNC: 2.3 MG/DL (ref 0.4–1.6)
EGFRCR SERPLBLD CKD-EPI 2021: 22 ML/MIN/1.73M*2
ERYTHROCYTE [DISTWIDTH] IN BLOOD BY AUTOMATED COUNT: 15.3 % (ref 11.5–14.5)
GLUCOSE BLD MANUAL STRIP-MCNC: 112 MG/DL (ref 74–99)
GLUCOSE BLD MANUAL STRIP-MCNC: 164 MG/DL (ref 74–99)
GLUCOSE BLD MANUAL STRIP-MCNC: 191 MG/DL (ref 74–99)
GLUCOSE BLD MANUAL STRIP-MCNC: 262 MG/DL (ref 74–99)
GLUCOSE SERPL-MCNC: 296 MG/DL (ref 65–99)
HCT VFR BLD AUTO: 43.3 % (ref 36–46)
HGB BLD-MCNC: 12.7 G/DL (ref 12–16)
MCH RBC QN AUTO: 27.1 PG (ref 26–34)
MCHC RBC AUTO-ENTMCNC: 29.3 G/DL (ref 32–36)
MCV RBC AUTO: 93 FL (ref 80–100)
NRBC BLD-RTO: 0 /100 WBCS (ref 0–0)
P AXIS: 70 DEGREES
P OFFSET: 172 MS
P ONSET: 137 MS
PLATELET # BLD AUTO: 239 X10*3/UL (ref 150–450)
POTASSIUM SERPL-SCNC: 4.2 MMOL/L (ref 3.4–5.1)
PR INTERVAL: 166 MS
PROT SERPL-MCNC: 6.1 G/DL (ref 5.9–7.9)
Q ONSET: 220 MS
QRS COUNT: 13 BEATS
QRS DURATION: 120 MS
QT INTERVAL: 454 MS
QTC CALCULATION(BAZETT): 506 MS
QTC FREDERICIA: 488 MS
R AXIS: -50 DEGREES
RBC # BLD AUTO: 4.68 X10*6/UL (ref 4–5.2)
SODIUM SERPL-SCNC: 137 MMOL/L (ref 133–145)
T AXIS: 94 DEGREES
T OFFSET: 447 MS
VENTRICULAR RATE: 75 BPM
WBC # BLD AUTO: 7.3 X10*3/UL (ref 4.4–11.3)

## 2024-06-26 PROCEDURE — 2500000002 HC RX 250 W HCPCS SELF ADMINISTERED DRUGS (ALT 637 FOR MEDICARE OP, ALT 636 FOR OP/ED): Performed by: HOSPITALIST

## 2024-06-26 PROCEDURE — 7100000002 HC RECOVERY ROOM TIME - EACH INCREMENTAL 1 MINUTE: Performed by: SURGERY

## 2024-06-26 PROCEDURE — 2720000007 HC OR 272 NO HCPCS: Performed by: SURGERY

## 2024-06-26 PROCEDURE — 2500000005 HC RX 250 GENERAL PHARMACY W/O HCPCS

## 2024-06-26 PROCEDURE — G0378 HOSPITAL OBSERVATION PER HR: HCPCS

## 2024-06-26 PROCEDURE — A36830 PR CREAT AV FISTULA,NON-AUTOGENOUS GRAFT: Performed by: ANESTHESIOLOGY

## 2024-06-26 PROCEDURE — 2500000004 HC RX 250 GENERAL PHARMACY W/ HCPCS (ALT 636 FOR OP/ED): Performed by: SURGERY

## 2024-06-26 PROCEDURE — 2500000001 HC RX 250 WO HCPCS SELF ADMINISTERED DRUGS (ALT 637 FOR MEDICARE OP): Performed by: INTERNAL MEDICINE

## 2024-06-26 PROCEDURE — C1768 GRAFT, VASCULAR: HCPCS | Performed by: SURGERY

## 2024-06-26 PROCEDURE — 82947 ASSAY GLUCOSE BLOOD QUANT: CPT

## 2024-06-26 PROCEDURE — 2500000005 HC RX 250 GENERAL PHARMACY W/O HCPCS: Performed by: SURGERY

## 2024-06-26 PROCEDURE — 7100000001 HC RECOVERY ROOM TIME - INITIAL BASE CHARGE: Performed by: SURGERY

## 2024-06-26 PROCEDURE — 36830 ARTERY-VEIN NONAUTOGRAFT: CPT | Performed by: SURGERY

## 2024-06-26 PROCEDURE — 85027 COMPLETE CBC AUTOMATED: CPT | Performed by: SURGERY

## 2024-06-26 PROCEDURE — 2500000005 HC RX 250 GENERAL PHARMACY W/O HCPCS: Performed by: HOSPITALIST

## 2024-06-26 PROCEDURE — 82947 ASSAY GLUCOSE BLOOD QUANT: CPT | Mod: 91

## 2024-06-26 PROCEDURE — 3600000004 HC OR TIME - INITIAL BASE CHARGE - PROCEDURE LEVEL FOUR: Performed by: SURGERY

## 2024-06-26 PROCEDURE — 96372 THER/PROPH/DIAG INJ SC/IM: CPT | Performed by: HOSPITALIST

## 2024-06-26 PROCEDURE — 3700000002 HC GENERAL ANESTHESIA TIME - EACH INCREMENTAL 1 MINUTE: Performed by: SURGERY

## 2024-06-26 PROCEDURE — 2500000005 HC RX 250 GENERAL PHARMACY W/O HCPCS: Performed by: REGISTERED NURSE

## 2024-06-26 PROCEDURE — 2500000004 HC RX 250 GENERAL PHARMACY W/ HCPCS (ALT 636 FOR OP/ED)

## 2024-06-26 PROCEDURE — 2500000001 HC RX 250 WO HCPCS SELF ADMINISTERED DRUGS (ALT 637 FOR MEDICARE OP): Performed by: HOSPITALIST

## 2024-06-26 PROCEDURE — 2500000004 HC RX 250 GENERAL PHARMACY W/ HCPCS (ALT 636 FOR OP/ED): Performed by: HOSPITALIST

## 2024-06-26 PROCEDURE — 2780000003 HC OR 278 NO HCPCS: Performed by: SURGERY

## 2024-06-26 PROCEDURE — 2500000002 HC RX 250 W HCPCS SELF ADMINISTERED DRUGS (ALT 637 FOR MEDICARE OP, ALT 636 FOR OP/ED): Performed by: ANESTHESIOLOGY

## 2024-06-26 PROCEDURE — 93005 ELECTROCARDIOGRAM TRACING: CPT

## 2024-06-26 PROCEDURE — 3700000001 HC GENERAL ANESTHESIA TIME - INITIAL BASE CHARGE: Performed by: SURGERY

## 2024-06-26 PROCEDURE — 80053 COMPREHEN METABOLIC PANEL: CPT | Performed by: SURGERY

## 2024-06-26 PROCEDURE — 3600000009 HC OR TIME - EACH INCREMENTAL 1 MINUTE - PROCEDURE LEVEL FOUR: Performed by: SURGERY

## 2024-06-26 PROCEDURE — A4550 SURGICAL TRAYS: HCPCS | Performed by: SURGERY

## 2024-06-26 DEVICE — FLEXCEL CAROTID SHUNT (8F, 10F, 12F, 14F)
Type: IMPLANTABLE DEVICE | Site: ARM | Status: FUNCTIONAL
Brand: FLEXCEL CAROTID SHUNT

## 2024-06-26 DEVICE — GORE ACUSEAL VASCULAR GRAFT 4-7MMX45CM TAPERED HEPARIN
Type: IMPLANTABLE DEVICE | Site: ARM | Status: FUNCTIONAL
Brand: GORE ACUSEAL VASCULAR GRAFT

## 2024-06-26 RX ORDER — FENTANYL CITRATE 50 UG/ML
INJECTION, SOLUTION INTRAMUSCULAR; INTRAVENOUS AS NEEDED
Status: DISCONTINUED | OUTPATIENT
Start: 2024-06-26 | End: 2024-06-26

## 2024-06-26 RX ORDER — INSULIN LISPRO 100 [IU]/ML
0-5 INJECTION, SOLUTION INTRAVENOUS; SUBCUTANEOUS
Status: DISCONTINUED | OUTPATIENT
Start: 2024-06-26 | End: 2024-06-27 | Stop reason: HOSPADM

## 2024-06-26 RX ORDER — CEFAZOLIN 1 G/1
INJECTION, POWDER, FOR SOLUTION INTRAVENOUS AS NEEDED
Status: DISCONTINUED | OUTPATIENT
Start: 2024-06-26 | End: 2024-06-26

## 2024-06-26 RX ORDER — DEXTROSE 50 % IN WATER (D50W) INTRAVENOUS SYRINGE
25
Status: DISCONTINUED | OUTPATIENT
Start: 2024-06-26 | End: 2024-06-27 | Stop reason: HOSPADM

## 2024-06-26 RX ORDER — ASPIRIN 81 MG/1
81 TABLET ORAL DAILY
Status: DISCONTINUED | OUTPATIENT
Start: 2024-06-26 | End: 2024-06-27 | Stop reason: HOSPADM

## 2024-06-26 RX ORDER — PHENYLEPHRINE HYDROCHLORIDE 10 MG/ML
INJECTION INTRAVENOUS AS NEEDED
Status: DISCONTINUED | OUTPATIENT
Start: 2024-06-26 | End: 2024-06-26

## 2024-06-26 RX ORDER — LIDOCAINE HYDROCHLORIDE AND EPINEPHRINE 20; 10 MG/ML; UG/ML
INJECTION, SOLUTION INFILTRATION; PERINEURAL AS NEEDED
Status: DISCONTINUED | OUTPATIENT
Start: 2024-06-26 | End: 2024-06-26 | Stop reason: HOSPADM

## 2024-06-26 RX ORDER — LEVOTHYROXINE SODIUM 50 UG/1
50 TABLET ORAL
Status: DISCONTINUED | OUTPATIENT
Start: 2024-06-27 | End: 2024-06-27 | Stop reason: HOSPADM

## 2024-06-26 RX ORDER — FENTANYL CITRATE 50 UG/ML
50 INJECTION, SOLUTION INTRAMUSCULAR; INTRAVENOUS EVERY 5 MIN PRN
Status: DISCONTINUED | OUTPATIENT
Start: 2024-06-26 | End: 2024-06-26 | Stop reason: HOSPADM

## 2024-06-26 RX ORDER — FENTANYL CITRATE 50 UG/ML
25 INJECTION, SOLUTION INTRAMUSCULAR; INTRAVENOUS EVERY 5 MIN PRN
Status: DISCONTINUED | OUTPATIENT
Start: 2024-06-26 | End: 2024-06-26 | Stop reason: HOSPADM

## 2024-06-26 RX ORDER — HYDRALAZINE HYDROCHLORIDE 20 MG/ML
5 INJECTION INTRAMUSCULAR; INTRAVENOUS EVERY 30 MIN PRN
Status: DISCONTINUED | OUTPATIENT
Start: 2024-06-26 | End: 2024-06-26 | Stop reason: HOSPADM

## 2024-06-26 RX ORDER — CLOPIDOGREL BISULFATE 75 MG/1
75 TABLET ORAL DAILY
Status: DISCONTINUED | OUTPATIENT
Start: 2024-06-26 | End: 2024-06-27 | Stop reason: HOSPADM

## 2024-06-26 RX ORDER — ACETAMINOPHEN 500 MG
1000 TABLET ORAL 2 TIMES DAILY
Status: DISCONTINUED | OUTPATIENT
Start: 2024-06-26 | End: 2024-06-27 | Stop reason: HOSPADM

## 2024-06-26 RX ORDER — LEVOTHYROXINE SODIUM 100 UG/1
200 TABLET ORAL
Status: DISCONTINUED | OUTPATIENT
Start: 2024-06-27 | End: 2024-06-27 | Stop reason: HOSPADM

## 2024-06-26 RX ORDER — PROPOFOL 10 MG/ML
INJECTION, EMULSION INTRAVENOUS AS NEEDED
Status: DISCONTINUED | OUTPATIENT
Start: 2024-06-26 | End: 2024-06-26

## 2024-06-26 RX ORDER — PANTOPRAZOLE SODIUM 40 MG/1
40 TABLET, DELAYED RELEASE ORAL 2 TIMES DAILY
Status: DISCONTINUED | OUTPATIENT
Start: 2024-06-26 | End: 2024-06-27 | Stop reason: HOSPADM

## 2024-06-26 RX ORDER — AMIODARONE HYDROCHLORIDE 200 MG/1
200 TABLET ORAL
Status: DISCONTINUED | OUTPATIENT
Start: 2024-06-27 | End: 2024-06-27 | Stop reason: HOSPADM

## 2024-06-26 RX ORDER — POLYETHYLENE GLYCOL 3350 17 G/17G
17 POWDER, FOR SOLUTION ORAL DAILY PRN
Status: DISCONTINUED | OUTPATIENT
Start: 2024-06-26 | End: 2024-06-27 | Stop reason: HOSPADM

## 2024-06-26 RX ORDER — HEPARIN SODIUM 5000 [USP'U]/ML
5000 INJECTION, SOLUTION INTRAVENOUS; SUBCUTANEOUS EVERY 8 HOURS
Status: DISCONTINUED | OUTPATIENT
Start: 2024-06-26 | End: 2024-06-27 | Stop reason: HOSPADM

## 2024-06-26 RX ORDER — LIDOCAINE HYDROCHLORIDE 10 MG/ML
INJECTION INFILTRATION; PERINEURAL AS NEEDED
Status: DISCONTINUED | OUTPATIENT
Start: 2024-06-26 | End: 2024-06-26

## 2024-06-26 RX ORDER — ALBUTEROL SULFATE 0.83 MG/ML
2.5 SOLUTION RESPIRATORY (INHALATION) ONCE AS NEEDED
Status: DISCONTINUED | OUTPATIENT
Start: 2024-06-26 | End: 2024-06-26 | Stop reason: HOSPADM

## 2024-06-26 RX ORDER — ALBUTEROL SULFATE 0.83 MG/ML
2.5 SOLUTION RESPIRATORY (INHALATION) EVERY 6 HOURS PRN
Status: DISCONTINUED | OUTPATIENT
Start: 2024-06-26 | End: 2024-06-27 | Stop reason: HOSPADM

## 2024-06-26 RX ORDER — HEPARIN SODIUM 1000 [USP'U]/ML
2000 INJECTION, SOLUTION INTRAVENOUS; SUBCUTANEOUS
Status: CANCELLED | OUTPATIENT
Start: 2024-06-27

## 2024-06-26 RX ORDER — TRAMADOL HYDROCHLORIDE 50 MG/1
50 TABLET ORAL ONCE
Status: COMPLETED | OUTPATIENT
Start: 2024-06-26 | End: 2024-06-26

## 2024-06-26 RX ORDER — ATORVASTATIN CALCIUM 40 MG/1
40 TABLET, FILM COATED ORAL DAILY
Status: DISCONTINUED | OUTPATIENT
Start: 2024-06-26 | End: 2024-06-27 | Stop reason: HOSPADM

## 2024-06-26 RX ORDER — BUPIVACAINE HYDROCHLORIDE 5 MG/ML
INJECTION, SOLUTION EPIDURAL; INTRACAUDAL AS NEEDED
Status: DISCONTINUED | OUTPATIENT
Start: 2024-06-26 | End: 2024-06-26 | Stop reason: HOSPADM

## 2024-06-26 RX ORDER — MIDAZOLAM HYDROCHLORIDE 1 MG/ML
1 INJECTION, SOLUTION INTRAMUSCULAR; INTRAVENOUS ONCE AS NEEDED
Status: DISCONTINUED | OUTPATIENT
Start: 2024-06-26 | End: 2024-06-26 | Stop reason: HOSPADM

## 2024-06-26 RX ORDER — VENLAFAXINE HYDROCHLORIDE 150 MG/1
150 CAPSULE, EXTENDED RELEASE ORAL DAILY
Status: DISCONTINUED | OUTPATIENT
Start: 2024-06-26 | End: 2024-06-27 | Stop reason: HOSPADM

## 2024-06-26 RX ORDER — SODIUM CHLORIDE, SODIUM LACTATE, POTASSIUM CHLORIDE, CALCIUM CHLORIDE 600; 310; 30; 20 MG/100ML; MG/100ML; MG/100ML; MG/100ML
100 INJECTION, SOLUTION INTRAVENOUS CONTINUOUS
Status: DISCONTINUED | OUTPATIENT
Start: 2024-06-26 | End: 2024-06-26 | Stop reason: HOSPADM

## 2024-06-26 RX ORDER — LIDOCAINE HYDROCHLORIDE 10 MG/ML
0.1 INJECTION INFILTRATION; PERINEURAL ONCE
Status: DISCONTINUED | OUTPATIENT
Start: 2024-06-26 | End: 2024-06-26 | Stop reason: HOSPADM

## 2024-06-26 RX ORDER — DEXTROSE 50 % IN WATER (D50W) INTRAVENOUS SYRINGE
12.5
Status: DISCONTINUED | OUTPATIENT
Start: 2024-06-26 | End: 2024-06-27 | Stop reason: HOSPADM

## 2024-06-26 RX ORDER — FERROUS GLUCONATE 325 MG
324 TABLET ORAL
Status: DISCONTINUED | OUTPATIENT
Start: 2024-06-27 | End: 2024-06-27

## 2024-06-26 RX ORDER — TRAMADOL HYDROCHLORIDE 50 MG/1
50 TABLET ORAL EVERY 12 HOURS PRN
Status: DISCONTINUED | OUTPATIENT
Start: 2024-06-26 | End: 2024-06-27 | Stop reason: HOSPADM

## 2024-06-26 RX ORDER — INSULIN GLARGINE 100 [IU]/ML
10 INJECTION, SOLUTION SUBCUTANEOUS NIGHTLY
Status: DISCONTINUED | OUTPATIENT
Start: 2024-06-26 | End: 2024-06-27 | Stop reason: HOSPADM

## 2024-06-26 RX ORDER — ONDANSETRON HYDROCHLORIDE 2 MG/ML
4 INJECTION, SOLUTION INTRAVENOUS ONCE AS NEEDED
Status: DISCONTINUED | OUTPATIENT
Start: 2024-06-26 | End: 2024-06-26 | Stop reason: HOSPADM

## 2024-06-26 RX ORDER — ONDANSETRON HYDROCHLORIDE 2 MG/ML
4 INJECTION, SOLUTION INTRAVENOUS EVERY 6 HOURS PRN
Status: DISCONTINUED | OUTPATIENT
Start: 2024-06-26 | End: 2024-06-27 | Stop reason: HOSPADM

## 2024-06-26 RX ORDER — METOPROLOL SUCCINATE 50 MG/1
50 TABLET, EXTENDED RELEASE ORAL DAILY
Status: DISCONTINUED | OUTPATIENT
Start: 2024-06-26 | End: 2024-06-27 | Stop reason: HOSPADM

## 2024-06-26 RX ORDER — ONDANSETRON HYDROCHLORIDE 2 MG/ML
INJECTION, SOLUTION INTRAVENOUS AS NEEDED
Status: DISCONTINUED | OUTPATIENT
Start: 2024-06-26 | End: 2024-06-26

## 2024-06-26 SDOH — SOCIAL STABILITY: SOCIAL INSECURITY: WERE YOU ABLE TO COMPLETE ALL THE BEHAVIORAL HEALTH SCREENINGS?: YES

## 2024-06-26 SDOH — SOCIAL STABILITY: SOCIAL INSECURITY: HAVE YOU HAD THOUGHTS OF HARMING ANYONE ELSE?: NO

## 2024-06-26 SDOH — SOCIAL STABILITY: SOCIAL INSECURITY: ARE THERE ANY APPARENT SIGNS OF INJURIES/BEHAVIORS THAT COULD BE RELATED TO ABUSE/NEGLECT?: NO

## 2024-06-26 SDOH — SOCIAL STABILITY: SOCIAL INSECURITY: ARE YOU OR HAVE YOU BEEN THREATENED OR ABUSED PHYSICALLY, EMOTIONALLY, OR SEXUALLY BY ANYONE?: NO

## 2024-06-26 SDOH — HEALTH STABILITY: MENTAL HEALTH: CURRENT SMOKER: 0

## 2024-06-26 SDOH — SOCIAL STABILITY: SOCIAL INSECURITY: HAVE YOU HAD ANY THOUGHTS OF HARMING ANYONE ELSE?: NO

## 2024-06-26 SDOH — SOCIAL STABILITY: SOCIAL INSECURITY: DO YOU FEEL UNSAFE GOING BACK TO THE PLACE WHERE YOU ARE LIVING?: NO

## 2024-06-26 SDOH — SOCIAL STABILITY: SOCIAL INSECURITY: HAS ANYONE EVER THREATENED TO HURT YOUR FAMILY OR YOUR PETS?: NO

## 2024-06-26 SDOH — SOCIAL STABILITY: SOCIAL INSECURITY: DO YOU FEEL ANYONE HAS EXPLOITED OR TAKEN ADVANTAGE OF YOU FINANCIALLY OR OF YOUR PERSONAL PROPERTY?: NO

## 2024-06-26 SDOH — SOCIAL STABILITY: SOCIAL INSECURITY: DOES ANYONE TRY TO KEEP YOU FROM HAVING/CONTACTING OTHER FRIENDS OR DOING THINGS OUTSIDE YOUR HOME?: NO

## 2024-06-26 SDOH — SOCIAL STABILITY: SOCIAL INSECURITY: ABUSE: ADULT

## 2024-06-26 ASSESSMENT — COGNITIVE AND FUNCTIONAL STATUS - GENERAL
DAILY ACTIVITIY SCORE: 18
TURNING FROM BACK TO SIDE WHILE IN FLAT BAD: A LITTLE
WALKING IN HOSPITAL ROOM: A LOT
DRESSING REGULAR LOWER BODY CLOTHING: A LITTLE
MOBILITY SCORE: 16
WALKING IN HOSPITAL ROOM: A LITTLE
PATIENT BASELINE BEDBOUND: NO
TOILETING: A LITTLE
MOVING FROM LYING ON BACK TO SITTING ON SIDE OF FLAT BED WITH BEDRAILS: A LITTLE
HELP NEEDED FOR BATHING: A LOT
DAILY ACTIVITIY SCORE: 17
CLIMB 3 TO 5 STEPS WITH RAILING: A LITTLE
MOVING TO AND FROM BED TO CHAIR: A LITTLE
STANDING UP FROM CHAIR USING ARMS: A LITTLE
TOILETING: A LITTLE
DRESSING REGULAR UPPER BODY CLOTHING: A LITTLE
MOBILITY SCORE: 20
DRESSING REGULAR UPPER BODY CLOTHING: A LITTLE
STANDING UP FROM CHAIR USING ARMS: A LITTLE
EATING MEALS: A LITTLE
MOVING TO AND FROM BED TO CHAIR: A LITTLE
DRESSING REGULAR LOWER BODY CLOTHING: A LOT
PERSONAL GROOMING: A LITTLE
CLIMB 3 TO 5 STEPS WITH RAILING: A LOT
HELP NEEDED FOR BATHING: A LITTLE
PERSONAL GROOMING: A LITTLE

## 2024-06-26 ASSESSMENT — PAIN SCALES - GENERAL
PAINLEVEL_OUTOF10: 3
PAINLEVEL_OUTOF10: 0 - NO PAIN
PAIN_LEVEL: 2
PAINLEVEL_OUTOF10: 0 - NO PAIN
PAINLEVEL_OUTOF10: 0 - NO PAIN
PAINLEVEL_OUTOF10: 8
PAINLEVEL_OUTOF10: 0 - NO PAIN
PAINLEVEL_OUTOF10: 7

## 2024-06-26 ASSESSMENT — ACTIVITIES OF DAILY LIVING (ADL)
TOILETING: NEEDS ASSISTANCE
HEARING - LEFT EAR: HEARING AID
HEARING - RIGHT EAR: HEARING AID
FEEDING YOURSELF: INDEPENDENT
ADEQUATE_TO_COMPLETE_ADL: YES
JUDGMENT_ADEQUATE_SAFELY_COMPLETE_DAILY_ACTIVITIES: YES
WALKS IN HOME: NEEDS ASSISTANCE
DRESSING YOURSELF: NEEDS ASSISTANCE
BATHING: NEEDS ASSISTANCE
GROOMING: INDEPENDENT
LACK_OF_TRANSPORTATION: NO
PATIENT'S MEMORY ADEQUATE TO SAFELY COMPLETE DAILY ACTIVITIES?: YES

## 2024-06-26 ASSESSMENT — LIFESTYLE VARIABLES
HOW OFTEN DO YOU HAVE A DRINK CONTAINING ALCOHOL: NEVER
SKIP TO QUESTIONS 9-10: 1
AUDIT-C TOTAL SCORE: 0
HOW MANY STANDARD DRINKS CONTAINING ALCOHOL DO YOU HAVE ON A TYPICAL DAY: PATIENT DOES NOT DRINK
HOW OFTEN DO YOU HAVE 6 OR MORE DRINKS ON ONE OCCASION: NEVER
SUBSTANCE_ABUSE_PAST_12_MONTHS: NO
AUDIT-C TOTAL SCORE: 0
PRESCIPTION_ABUSE_PAST_12_MONTHS: NO

## 2024-06-26 ASSESSMENT — PAIN - FUNCTIONAL ASSESSMENT
PAIN_FUNCTIONAL_ASSESSMENT: CPOT (CRITICAL CARE PAIN OBSERVATION TOOL)
PAIN_FUNCTIONAL_ASSESSMENT: 0-10

## 2024-06-26 ASSESSMENT — PATIENT HEALTH QUESTIONNAIRE - PHQ9
2. FEELING DOWN, DEPRESSED OR HOPELESS: NOT AT ALL
1. LITTLE INTEREST OR PLEASURE IN DOING THINGS: NOT AT ALL
SUM OF ALL RESPONSES TO PHQ9 QUESTIONS 1 & 2: 0

## 2024-06-26 ASSESSMENT — PAIN DESCRIPTION - DESCRIPTORS
DESCRIPTORS: DULL
DESCRIPTORS: ACHING

## 2024-06-26 ASSESSMENT — PAIN SCALES - WONG BAKER: WONGBAKER_NUMERICALRESPONSE: NO HURT

## 2024-06-26 NOTE — ANESTHESIA PREPROCEDURE EVALUATION
Patient: Daphne Morris    Procedure Information       Date/Time: 06/26/24 0800    Procedure: Creation Arteriovenous Shunt *PAT ON ADMIT* (Right) - *PAT ON ADMIT*    Location: [facility] OR 08 / Virtual [facility]    Surgeons: Jamee Haddad MD            Relevant Problems   Cardiac   (+) ASHD (arteriosclerotic heart disease)   (+) Hyperlipidemia, unspecified   (+) Hypertension   (+) Paroxysmal atrial fibrillation (Multi)   (+) Peripheral vascular disease (CMS-HCC)      Pulmonary   (+) Chronic obstructive pulmonary disease (Multi)   (+) Pulmonary hypertension (Multi)      Neuro   (+) Anxiety   (+) Cerebrovascular accident (CVA) (Multi)   (+) Major depressive disorder, single episode, unspecified      GI   (+) Gastroesophageal reflux disease      /Renal   (+) Acute cystitis with hematuria   (+) CKD (chronic kidney disease) stage V requiring chronic dialysis (Multi)   (+) ESRD on dialysis (Multi)      Endocrine   (+) Diabetic renal disease (Multi)   (+) Hypothyroidism   (+) Type 2 diabetes mellitus (Multi)      Hematology   (+) Iron deficiency anemia due to chronic blood loss      Musculoskeletal   (+) Osteoarthritis      ID   (+) Abrasion of buttock, infected   (+) Influenza B       Clinical information reviewed:   Tobacco  Allergies  Meds   Med Hx  Surg Hx   Fam Hx  Soc Hx        NPO Detail:  NPO/Void Status  Carbohydrate Drink Given Prior to Surgery? : N  Date of Last Liquid: 06/25/24  Time of Last Liquid: 2100  Date of Last Solid: 06/25/24  Time of Last Solid: 2100  Last Intake Type: Clear fluids  Time of Last Void: 0600         Physical Exam    Airway  Mallampati: II  TM distance: >3 FB  Neck ROM: full     Cardiovascular - normal exam     Dental - normal exam     Pulmonary - normal exam     Abdominal - normal exam             Anesthesia Plan    History of general anesthesia?: yes  History of complications of general anesthesia?: no    ASA 3     general     The patient is not a current smoker.  Patient was not  previously instructed to abstain from smoking on day of procedure.  Patient did not smoke on day of procedure.  Education provided regarding risk of obstructive sleep apnea.  intravenous induction   Postoperative administration of opioids is intended.  Trial extubation is planned.  Anesthetic plan and risks discussed with patient.  Use of blood products discussed with patient who consented to blood products.    Plan discussed with CAA, attending and CRNA.

## 2024-06-26 NOTE — ANESTHESIA PROCEDURE NOTES
Airway  Date/Time: 6/26/2024 8:35 AM  Urgency: elective    Airway not difficult    Staffing  Performed: [facility]   Authorized by: Tad Mayers MD    Performed by: Nona Tucker  Patient location during procedure: OR    Indications and Patient Condition  Indications for airway management: anesthesia  Spontaneous Ventilation: absent  Sedation level: deep  Preoxygenated: yes  Patient position: sniffing  Mask difficulty assessment: 0 - not attempted  Planned trial extubation    Final Airway Details  Final airway type: supraglottic airway      Successful airway: classic  Size 3     Number of attempts at approach: 1

## 2024-06-26 NOTE — OP NOTE
Creation Arteriovenous Shunt *PAT ON ADMIT* (R) Operative Note     Date: 2024  OR Location: [facility]    Name: Daphne Morris, : 1955, Age: 69 y.o., MRN: [id number], Sex: female    Diagnosis  Pre-op Diagnosis     * CKD (chronic kidney disease) stage V requiring chronic dialysis (Multi) [N18.6, Z99.2] Post-op Diagnosis     * CKD (chronic kidney disease) stage V requiring chronic dialysis (Multi) [N18.6, Z99.2]     Procedures  Creation Arteriovenous Shunt *PAT ON ADMIT*  [id number] - SC CRTJ ARVEN FSTL XCP DIR ARVEN ANAST NONAUTOG GRF      Surgeons      * Jamee Haddad - Primary    Resident/Fellow/Other Assistant:  Surgeons and Role:  * No surgeons found with a matching role *    Procedure Summary  Anesthesia: General  ASA: III  Anesthesia Staff: Anesthesiologist: Tad Mayers MD  SRNA: Nona Tucker  Estimated Blood Loss: 5mL  Intra-op Medications:   Administrations occurring from 0800 to 1015 on 24:   Medication Name Total Dose   lidocaine-epinephrine (Xylocaine W/EPI) 2 %-1:100,000 injection 20 mL   bupivacaine PF (Marcaine) 0.5 % (5 mg/mL) injection 20 mL   thrombin (recombinant) (Recothrom) topical solution 10,000 Units   heparin 10,000 Units in sodium chloride 0.9 % 1,000 mL irrigation 1,010 mL   insulin regular (HumuLIN R,NovoLIN R) injection 8 Units 8 Units              Anesthesia Record               Intraprocedure I/O Totals          Intake    NaCl 0.9 % bolus 500.00 mL    Total Intake 500 mL          Specimen: No specimens collected     Staff:   Circulator: Mariaa  Scrub Person: Day  Scrub Person: Zack         Drains and/or Catheters: * None in log *    Tourniquet Times:         Implants:  Implants       Type Name Action Serial No.       FLEXCEL STRAIGHT CAROTID SHUNTS, INCLUDES SIZES 8FR, 10FR, 12FR, 14FR Implanted      Graft GRAFT, ACUSEAL, 4-7MM X 45CM TAPERED - [id number] Implanted [id number]              Findings:       Indications: Daphne Morris is an 69 y.o. female who is having  surgery for CKD (chronic kidney disease) stage V requiring chronic dialysis (Multi) [N18.6, Z99.2].     The patient was seen in the preoperative area. The risks, benefits, complications, treatment options, non-operative alternatives, expected recovery and outcomes were discussed with the patient. The possibilities of reaction to medication, pulmonary aspiration, injury to surrounding structures, bleeding, recurrent infection, the need for additional procedures, failure to diagnose a condition, and creating a complication requiring transfusion or operation were discussed with the patient. The patient concurred with the proposed plan, giving informed consent.  The site of surgery was properly noted/marked if necessary per policy. The patient has been actively warmed in preoperative area. Preoperative antibiotics have been ordered and given within 1 hours of incision. Venous thrombosis prophylaxis are not indicated.    Patient with known heart failure with preserved ejection fraction with a right heart catheterization performed in February of this year showing moderately elevated PA pressures and dilated RV and RA on echo.  I spoke with my cardiology colleague at [facility] and given the fact that she has reasonable numbers I did not feel that I could deny this patient long-term access.  The other is that she has very atrophic, crepey skin which will take a long time to heal.    After discussion with anesthesiology decides to proceed after morning the patient that after creation of the fistula the patient may have the fistula taken down if she could not tolerate this hemodynamically.    Procedure Details:   Supine position, left arm prepped and draped.  Longitudinal incision created in the proximal arm exposing the axillary vein which was controlled over a 5 cm length including large tributaries with Vesseloops.  Immediately distal to this on the arm I exposed the brachial artery.  I chose a proximal segment to minimize  the potential for steal syndrome on this patient.  A counterincision was made above the antecubital fossa and a 4-7 mm AcuSeal graft was tunneled between these 2 incisions with a Liz-Wick tunneler.  No kinks were observed.  The artery was then clamped with loops and local heparinized saline was infused.  End-to-side anastomosis with 6-0 Prolene was performed to a beveled anastomosis on the 4 mm segment of the graft.  The anastomosis was hemostatic.  The graft was then flushed with heparinized saline.  The graft was then anastomosed end to side to the axillary vein.  Upon release of clamps the patient's flows were excellent in the fistula.  The patient's blood pressure was observed to not drift downwards.  No tachycardia was observed.  The patient had monophasic radial artery signal and ulnar artery signal at the completion of the case.  The wounds were irrigated and closed using Vicryl and Monocryl and dressed with skin glue.  Again because of the atrophy of the skin and poor condition the patient will require some time to mature this as the skin was quite thin in the tunneled area.    Lidocaine 1% mixed one-to-one with Marcaine half percent with epinephrine was given as local anesthesia throughout the procedure including the tunnel track and a total of 18 mL was infused.  A mildly compressive dressing was applied over the graft.      Complications:  None; patient tolerated the procedure well.    Disposition: PACU - hemodynamically stable.  Condition: stable         Additional Details:     Attending Attestation: I was present for the entire procedure.    [facility]  Phone Number: [phone]

## 2024-06-26 NOTE — PRE-PROCEDURE NOTE
Addressed code status with patient, she states she DOES NOT have a DNR order. She would like all measures to resuscitate if needed, up to and including intubation and CPR.  Conversation witnessed by Nona in anesthesia

## 2024-06-26 NOTE — CARE PLAN
The patient's goals for the shift include To get ready to be discharged tomorrow morning    The clinical goals for the shift include To eat and get some rest    Over the shift, the patient did not make progress toward the following goals. Barriers to progression include [place] and possible impulsiveness. Recommendations to address these barriers include education on using call light to ask for help.

## 2024-06-26 NOTE — ANESTHESIA POSTPROCEDURE EVALUATION
Patient: Daphne Morris    Procedure Summary       Date: 06/26/24 Room / Location: [facility] OR 08 / [facility]    Anesthesia Start: 0822 Anesthesia Stop: 1044    Procedure: Creation Arteriovenous Shunt *PAT ON ADMIT* (Right: Arm Upper) Diagnosis:       CKD (chronic kidney disease) stage V requiring chronic dialysis (Multi)      (CKD (chronic kidney disease) stage V requiring chronic dialysis (Multi) [N18.6, Z99.2])    Surgeons: Jamee Haddad MD Responsible Provider: Tad Mayers MD    Anesthesia Type: general ASA Status: 3            Anesthesia Type: general    Vitals Value Taken Time   /62 06/26/24 1240   Temp 37 06/26/24 1241   Pulse 66 06/26/24 1240   Resp 16 06/26/24 1240   SpO2 91 % 06/26/24 1227   Vitals shown include unfiled device data.    Anesthesia Post Evaluation    Patient location during evaluation: PACU  Patient participation: complete - patient participated  Level of consciousness: sleepy but conscious  Pain score: 2  Pain management: adequate  Multimodal analgesia pain management approach  Airway patency: patent  Cardiovascular status: acceptable  Respiratory status: acceptable  Hydration status: acceptable  Postoperative Nausea and Vomiting: none        There were no known notable events for this encounter.

## 2024-06-26 NOTE — H&P
History Of Present Illness  Daphne Morris is a 69 y.o. female presenting for AV shunt creation; hospitalized afterwards for observation.  Patient has a history of end-stage renal disease on dialysis .  She also has a history of COPD, wears 3 L nasal cannula at night with chronic respiratory failure with hypoxia, has chronic diastolic heart failure, has history of stroke, history of hypothyroidism .she gets dialysis normally through her permacath.  She came in for AV shunt creation by Dr. Haddad.  Procedure was successful and patient was admitted afterwards for observation.  She reports prior to her procedure she was in her usual state of health.  Denies any pain, shortness of breath, nausea, vomiting, weakness.  Has been taking her medications regularly except for today, which she held because she was n.p.o.  Reports currently she has some right shoulder pain after her procedure.     Past Medical History  She has a past medical history of Anal fissure (10/12/2023), Anemia, ASHD (arteriosclerotic heart disease), At risk for falls, Atrial fibrillation (Multi), CHF (congestive heart failure) (Multi), CKD (chronic kidney disease), COPD (chronic obstructive pulmonary disease) (Multi), CVA (cerebral vascular accident) (Multi), Depression, Diabetes mellitus (Multi), GERD (gastroesophageal reflux disease), H/O pleural effusion, Hyperlipidemia, Hypertension, Hypothyroidism, Hypoxia, Impacted cerumen, left ear, Noncompliance, Osteoarthritis, Perianal dermatitis (10/12/2023), Personal history of other diseases of the respiratory system, PVD (peripheral vascular disease) (CMS-HCC), Renal carcinoma, right (Multi), Respiratory failure (Multi), TIA (transient ischemic attack), Vasculitis (CMS-HCC) (10/12/2023), and Weakness.    Surgical History  She has a past surgical history that includes MR angio chest w and wo IV contrast (2023); MR angio head wo IV contrast (2021);  section,  classic; Shoulder surgery; Ankle surgery; Cataract extraction (Right); Nephrectomy (08/04/2021); and Cardiac catheterization (N/A, 2/7/2024).     Social History  She reports that she has been smoking cigarettes. She started smoking about 55 years ago. She has a 27.7 pack-year smoking history. She has never been exposed to tobacco smoke. She has never used smokeless tobacco. She reports that she does not currently use alcohol. She reports that she does not currently use drugs after having used the following drugs: Marijuana.    Family History  Family History   Problem Relation Name Age of Onset    Hypertension Mother      Diabetes Father      Heart disease Father      Cancer Sister      Cancer Brother      Diabetes Daughter      Asthma Daughter      Heart attack Daughter      Diabetes Maternal Grandfather      Heart disease Paternal Grandmother      Diabetes Other      Hypertension Other      COPD Other      Other (chronic lung disease) Other          Allergies  Bee venom protein (honey bee); Citalopram; Codeine; Influenza virus vaccines; Latex; Latex, natural rubber; Lisinopril; Penicillins; Adhesive tape-silicones; Amoxicillin; Doxycycline; Oseltamivir; Phenyleph-min oil-petrolatum; Phenyleph-shark oil-glyc-pet; Phenylephrine; Prednisone; Tuberculin ppd; and Xylometazoline    Review of Systems  General: no fatigue, no malaise, no fevers/chills   HENT: no rhinorrhea, no sore throat, no ear pain   Eyes: no change in vision, denies eye pain or discharge   Lungs: no SOB, no cough, no hemoptysis   CV: no chest pain, no palpitations, no leg edema   Abd: no nausea/vomiting, no constipation/diarrhea, no abdominal pain   : no dysuria, no frequency, no nocturia, no flank pain   Endocrine: no polydipsia/polyuria, no hot or cold intolerance   Neuro: no headaches, no syncope, no seizures   MSK: no back pain, no neck pain, no joint problems. +R shoulder pain   Psych: no anxiety, no depression, no hallucinations    Physical  Exam  General: alert, no diaphoresis   HENT: mucous membranes moist, external ears normal, no rhinorrhea   Eyes: no icterus or injection, no discharge   Lungs: CTA BL   Heart: RRR, no LE edema BL   GI: abdomen soft, nontender, nondistended, BS present   MSK: no joint effusion or deformity   Skin: no rashes, erythema, or ecchymosis   Neuro: grossly normal cognition, motor strength, sensation       Last Recorded Vitals  /66 (BP Location: Left arm, Patient Position: Lying)   Pulse 68   Temp 36.8 °C (98.2 °F) (Oral)   Resp 16   Wt 71.2 kg (157 lb)   SpO2 96%     Relevant Results             Assessment/Plan   Principal Problem:    CKD (chronic kidney disease) stage V requiring chronic dialysis (Multi)  Active Problems:    ESRD (end stage renal disease) (Multi)      ESRD on HD  - normally HD on T, Th, Sat.   - Follows with Dr. Mitchell who has been consulted    S/p AV shunt creation  - Dr. Haddad consulted    Diabetes mellitus type 2  -Continue Lantus, sliding scale insulin, hypoglycemia protocol    COPD  Chronic respiratory failure with hypoxia-wears 3 L nasal cannula at home at bedtime  -Home O2 has been ordered.  Her home breathing treatments have been ordered    History of stroke  -Discussed with bekah Guerra to resume Plavix tomorrow.  Continue statin.    Atrial fibrillation  -Status post Watchman procedure, she is not on anticoagulation  -Continue metoprolol, amiodarone    Hypothyroidism  -Continue Synthroid    DVT prophylaxis  -Heparin      Anticipate observation overnight.  Hopefully discharge tomorrow, probably after dialysis.  Discussed with Mariela Martin CNP for vascular surgery.       Jackelyn Kaminski DO

## 2024-06-26 NOTE — PRE-PROCEDURE NOTE
Pt taken to bay in wheelchair, does not appear under any distress, but appears frail. Assisted to bed to get dressed, pt has would to right front calf covered with dry and intact dressing from previous fall

## 2024-06-27 ENCOUNTER — APPOINTMENT (OUTPATIENT)
Dept: DIALYSIS | Facility: HOSPITAL | Age: 69
End: 2024-06-27
Payer: MEDICARE

## 2024-06-27 ENCOUNTER — TELEPHONE (OUTPATIENT)
Dept: PRIMARY CARE | Facility: CLINIC | Age: 69
End: 2024-06-27

## 2024-06-27 ENCOUNTER — HOME CARE VISIT (OUTPATIENT)
Dept: HOME HEALTH SERVICES | Facility: HOME HEALTH | Age: 69
End: 2024-06-27
Payer: MEDICARE

## 2024-06-27 VITALS
WEIGHT: 162.48 LBS | HEIGHT: 66 IN | TEMPERATURE: 96.1 F | RESPIRATION RATE: 16 BRPM | OXYGEN SATURATION: 95 % | BODY MASS INDEX: 26.11 KG/M2 | SYSTOLIC BLOOD PRESSURE: 122 MMHG | DIASTOLIC BLOOD PRESSURE: 51 MMHG | HEART RATE: 62 BPM

## 2024-06-27 PROBLEM — N18.6 CKD (CHRONIC KIDNEY DISEASE) REQUIRING CHRONIC DIALYSIS (MULTI): Status: ACTIVE | Noted: 2024-06-27

## 2024-06-27 PROBLEM — Z99.2 CKD (CHRONIC KIDNEY DISEASE) REQUIRING CHRONIC DIALYSIS (MULTI): Status: ACTIVE | Noted: 2024-06-27

## 2024-06-27 LAB
ANION GAP SERPL CALC-SCNC: 10 MMOL/L
BUN SERPL-MCNC: 32 MG/DL (ref 8–25)
CALCIUM SERPL-MCNC: 7.9 MG/DL (ref 8.5–10.4)
CHLORIDE SERPL-SCNC: 101 MMOL/L (ref 97–107)
CO2 SERPL-SCNC: 24 MMOL/L (ref 24–31)
CREAT SERPL-MCNC: 2.9 MG/DL (ref 0.4–1.6)
EGFRCR SERPLBLD CKD-EPI 2021: 17 ML/MIN/1.73M*2
ERYTHROCYTE [DISTWIDTH] IN BLOOD BY AUTOMATED COUNT: 15.4 % (ref 11.5–14.5)
GLUCOSE BLD MANUAL STRIP-MCNC: 163 MG/DL (ref 74–99)
GLUCOSE BLD MANUAL STRIP-MCNC: 194 MG/DL (ref 74–99)
GLUCOSE BLD MANUAL STRIP-MCNC: 207 MG/DL (ref 74–99)
GLUCOSE SERPL-MCNC: 235 MG/DL (ref 65–99)
HCT VFR BLD AUTO: 39.1 % (ref 36–46)
HGB BLD-MCNC: 11.6 G/DL (ref 12–16)
MCH RBC QN AUTO: 27.4 PG (ref 26–34)
MCHC RBC AUTO-ENTMCNC: 29.7 G/DL (ref 32–36)
MCV RBC AUTO: 92 FL (ref 80–100)
NRBC BLD-RTO: 0 /100 WBCS (ref 0–0)
PLATELET # BLD AUTO: 254 X10*3/UL (ref 150–450)
POTASSIUM SERPL-SCNC: 5.1 MMOL/L (ref 3.4–5.1)
RBC # BLD AUTO: 4.24 X10*6/UL (ref 4–5.2)
SODIUM SERPL-SCNC: 135 MMOL/L (ref 133–145)
WBC # BLD AUTO: 9.5 X10*3/UL (ref 4.4–11.3)

## 2024-06-27 PROCEDURE — G0378 HOSPITAL OBSERVATION PER HR: HCPCS

## 2024-06-27 PROCEDURE — 2500000004 HC RX 250 GENERAL PHARMACY W/ HCPCS (ALT 636 FOR OP/ED): Performed by: INTERNAL MEDICINE

## 2024-06-27 PROCEDURE — 2500000004 HC RX 250 GENERAL PHARMACY W/ HCPCS (ALT 636 FOR OP/ED): Performed by: HOSPITALIST

## 2024-06-27 PROCEDURE — 2500000001 HC RX 250 WO HCPCS SELF ADMINISTERED DRUGS (ALT 637 FOR MEDICARE OP): Performed by: HOSPITALIST

## 2024-06-27 PROCEDURE — 99024 POSTOP FOLLOW-UP VISIT: CPT | Performed by: NURSE PRACTITIONER

## 2024-06-27 PROCEDURE — 85027 COMPLETE CBC AUTOMATED: CPT | Performed by: HOSPITALIST

## 2024-06-27 PROCEDURE — 82947 ASSAY GLUCOSE BLOOD QUANT: CPT | Mod: 91

## 2024-06-27 PROCEDURE — 96372 THER/PROPH/DIAG INJ SC/IM: CPT | Performed by: HOSPITALIST

## 2024-06-27 PROCEDURE — 8010000001 HC DIALYSIS - HEMODIALYSIS PER DAY

## 2024-06-27 PROCEDURE — 2500000005 HC RX 250 GENERAL PHARMACY W/O HCPCS: Performed by: REGISTERED NURSE

## 2024-06-27 PROCEDURE — 99222 1ST HOSP IP/OBS MODERATE 55: CPT | Performed by: NURSE PRACTITIONER

## 2024-06-27 PROCEDURE — 80048 BASIC METABOLIC PNL TOTAL CA: CPT | Performed by: HOSPITALIST

## 2024-06-27 PROCEDURE — 2500000002 HC RX 250 W HCPCS SELF ADMINISTERED DRUGS (ALT 637 FOR MEDICARE OP, ALT 636 FOR OP/ED): Performed by: HOSPITALIST

## 2024-06-27 PROCEDURE — 7100000011 HC EXTENDED STAY RECOVERY HOURLY - NURSING UNIT

## 2024-06-27 RX ORDER — HEPARIN SODIUM 1000 [USP'U]/ML
1800 INJECTION, SOLUTION INTRAVENOUS; SUBCUTANEOUS
Status: DISCONTINUED | OUTPATIENT
Start: 2024-06-27 | End: 2024-06-27 | Stop reason: HOSPADM

## 2024-06-27 RX ORDER — HEPARIN SODIUM 1000 [USP'U]/ML
1900 INJECTION, SOLUTION INTRAVENOUS; SUBCUTANEOUS
Status: DISCONTINUED | OUTPATIENT
Start: 2024-06-27 | End: 2024-06-27 | Stop reason: HOSPADM

## 2024-06-27 SDOH — ECONOMIC STABILITY: INCOME INSECURITY: IN THE LAST 12 MONTHS, WAS THERE A TIME WHEN YOU WERE NOT ABLE TO PAY THE MORTGAGE OR RENT ON TIME?: NO

## 2024-06-27 SDOH — SOCIAL STABILITY: SOCIAL INSECURITY: WITHIN THE LAST YEAR, HAVE YOU BEEN AFRAID OF YOUR PARTNER OR EX-PARTNER?: NO

## 2024-06-27 SDOH — ECONOMIC STABILITY: INCOME INSECURITY: HOW HARD IS IT FOR YOU TO PAY FOR THE VERY BASICS LIKE FOOD, HOUSING, MEDICAL CARE, AND HEATING?: NOT HARD AT ALL

## 2024-06-27 SDOH — ECONOMIC STABILITY: FOOD INSECURITY: WITHIN THE PAST 12 MONTHS, YOU WORRIED THAT YOUR FOOD WOULD RUN OUT BEFORE YOU GOT MONEY TO BUY MORE.: NEVER TRUE

## 2024-06-27 SDOH — ECONOMIC STABILITY: FOOD INSECURITY: WITHIN THE PAST 12 MONTHS, THE FOOD YOU BOUGHT JUST DIDN'T LAST AND YOU DIDN'T HAVE MONEY TO GET MORE.: NEVER TRUE

## 2024-06-27 SDOH — SOCIAL STABILITY: SOCIAL INSECURITY: WITHIN THE LAST YEAR, HAVE YOU BEEN HUMILIATED OR EMOTIONALLY ABUSED IN OTHER WAYS BY YOUR PARTNER OR EX-PARTNER?: NO

## 2024-06-27 SDOH — HEALTH STABILITY: MENTAL HEALTH: HOW OFTEN DO YOU HAVE A DRINK CONTAINING ALCOHOL?: NEVER

## 2024-06-27 SDOH — ECONOMIC STABILITY: INCOME INSECURITY: IN THE PAST 12 MONTHS, HAS THE ELECTRIC, GAS, OIL, OR WATER COMPANY THREATENED TO SHUT OFF SERVICE IN YOUR HOME?: NO

## 2024-06-27 SDOH — SOCIAL STABILITY: SOCIAL NETWORK: HOW OFTEN DO YOU ATTENT MEETINGS OF THE CLUB OR ORGANIZATION YOU BELONG TO?: NEVER

## 2024-06-27 SDOH — HEALTH STABILITY: MENTAL HEALTH: HOW OFTEN DO YOU HAVE 6 OR MORE DRINKS ON ONE OCCASION?: NEVER

## 2024-06-27 SDOH — SOCIAL STABILITY: SOCIAL NETWORK: HOW OFTEN DO YOU ATTEND CHURCH OR RELIGIOUS SERVICES?: MORE THAN 4 TIMES PER YEAR

## 2024-06-27 SDOH — HEALTH STABILITY: PHYSICAL HEALTH: ON AVERAGE, HOW MANY MINUTES DO YOU ENGAGE IN EXERCISE AT THIS LEVEL?: 0 MIN

## 2024-06-27 SDOH — HEALTH STABILITY: PHYSICAL HEALTH: ON AVERAGE, HOW MANY DAYS PER WEEK DO YOU ENGAGE IN MODERATE TO STRENUOUS EXERCISE (LIKE A BRISK WALK)?: 0 DAYS

## 2024-06-27 SDOH — ECONOMIC STABILITY: HOUSING INSECURITY: IN THE LAST 12 MONTHS, HOW MANY PLACES HAVE YOU LIVED?: 1

## 2024-06-27 SDOH — SOCIAL STABILITY: SOCIAL NETWORK: HOW OFTEN DO YOU GET TOGETHER WITH FRIENDS OR RELATIVES?: MORE THAN THREE TIMES A WEEK

## 2024-06-27 SDOH — HEALTH STABILITY: MENTAL HEALTH: HOW MANY STANDARD DRINKS CONTAINING ALCOHOL DO YOU HAVE ON A TYPICAL DAY?: PATIENT DOES NOT DRINK

## 2024-06-27 ASSESSMENT — ACTIVITIES OF DAILY LIVING (ADL): LACK_OF_TRANSPORTATION: NO

## 2024-06-27 ASSESSMENT — COGNITIVE AND FUNCTIONAL STATUS - GENERAL
STANDING UP FROM CHAIR USING ARMS: A LITTLE
PERSONAL GROOMING: A LITTLE
DRESSING REGULAR UPPER BODY CLOTHING: A LITTLE
WALKING IN HOSPITAL ROOM: A LITTLE
CLIMB 3 TO 5 STEPS WITH RAILING: A LITTLE
TOILETING: A LITTLE
MOBILITY SCORE: 20
DAILY ACTIVITIY SCORE: 18
EATING MEALS: A LITTLE
DRESSING REGULAR LOWER BODY CLOTHING: A LITTLE
HELP NEEDED FOR BATHING: A LITTLE
MOVING TO AND FROM BED TO CHAIR: A LITTLE

## 2024-06-27 ASSESSMENT — LIFESTYLE VARIABLES
AUDIT-C TOTAL SCORE: 0
SKIP TO QUESTIONS 9-10: 1

## 2024-06-27 ASSESSMENT — PAIN - FUNCTIONAL ASSESSMENT
PAIN_FUNCTIONAL_ASSESSMENT: 0-10
PAIN_FUNCTIONAL_ASSESSMENT: WONG-BAKER FACES
PAIN_FUNCTIONAL_ASSESSMENT: 0-10
PAIN_FUNCTIONAL_ASSESSMENT: NO/DENIES PAIN

## 2024-06-27 ASSESSMENT — PAIN DESCRIPTION - ORIENTATION
ORIENTATION: RIGHT;UPPER
ORIENTATION: RIGHT

## 2024-06-27 ASSESSMENT — PAIN SCALES - WONG BAKER: WONGBAKER_NUMERICALRESPONSE: NO HURT

## 2024-06-27 ASSESSMENT — PAIN DESCRIPTION - DESCRIPTORS: DESCRIPTORS: DISCOMFORT;ACHING

## 2024-06-27 ASSESSMENT — PAIN DESCRIPTION - LOCATION
LOCATION: ARM
LOCATION: ARM

## 2024-06-27 ASSESSMENT — PAIN SCALES - GENERAL
PAINLEVEL_OUTOF10: 9
PAINLEVEL_OUTOF10: 8
PAINLEVEL_OUTOF10: 2

## 2024-06-27 NOTE — CONSULTS
Reason For Consult  Postop graft    History Of Present Illness  Daphne Morris is a 69 y.o. female presenting with a history of end-stage renal disease requiring hemodialysis.  The patient underwent AV graft placement in the right upper extremity yesterday by Dr. Haddad.  She was admitted postoperatively for observation given her history of congestive heart failure in the setting of new AV graft.  Patient has no specific complaints today.  She really wants to go home.     Past Medical History  She has a past medical history of Anal fissure (10/12/2023), Anemia, ASHD (arteriosclerotic heart disease), At risk for falls, Atrial fibrillation (Multi), CHF (congestive heart failure) (Multi), CKD (chronic kidney disease), COPD (chronic obstructive pulmonary disease) (Multi), CVA (cerebral vascular accident) (Multi), Depression, Diabetes mellitus (Multi), GERD (gastroesophageal reflux disease), H/O pleural effusion, Hyperlipidemia, Hypertension, Hypothyroidism, Hypoxia, Impacted cerumen, left ear, Noncompliance, Osteoarthritis, Perianal dermatitis (10/12/2023), Personal history of other diseases of the respiratory system, PVD (peripheral vascular disease) (CMS-HCC), Renal carcinoma, right (Multi), Respiratory failure (Multi), TIA (transient ischemic attack), Vasculitis (CMS-[facility]) (10/12/2023), and Weakness.    Surgical History  She has a past surgical history that includes MR angio chest w and wo IV contrast (2023); MR angio head wo IV contrast (2021);  section, classic; Shoulder surgery; Ankle surgery; Cataract extraction (Right); Nephrectomy (2021); and Cardiac catheterization (N/A, 2024).     Social History  She reports that she has been smoking cigarettes. She started smoking about 55 years ago. She has a 27.7 pack-year smoking history. She has never been exposed to tobacco smoke. She has never used smokeless tobacco. She reports that she does not currently use alcohol. She reports that she  does not currently use drugs after having used the following drugs: Marijuana.    Family History  Family History   Problem Relation Name Age of Onset    Hypertension Mother      Diabetes Father      Heart disease Father      Cancer Sister      Cancer Brother      Diabetes Daughter      Asthma Daughter      Heart attack Daughter      Diabetes Maternal Grandfather      Heart disease Paternal Grandmother      Diabetes Other      Hypertension Other      COPD Other      Other (chronic lung disease) Other          Allergies  Bee venom protein (honey bee); Citalopram; Codeine; Influenza virus vaccines; Latex; Latex, natural rubber; Lisinopril; Penicillins; Adhesive tape-silicones; Amoxicillin; Doxycycline; Oseltamivir; Phenyleph-min oil-petrolatum; Phenyleph-shark oil-glyc-pet; Phenylephrine; Prednisone; Tuberculin ppd; and Xylometazoline    Review of Systems    CONSTITUTIONAL: Denies weight loss, fever and chills.    HEENT: Denies changes in vision and hearing.    RESPIRATORY: Denies SOB and cough.    CV: Denies palpitations and CP.    GI: Denies abdominal pain, nausea, vomiting and diarrhea.    : Denies dysuria and urinary frequency.    MSK: Denies myalgia and joint pain.    SKIN: Denies rash and pruritus.    VASC: Denies claudication, ischemic rest pain, or open wounds or sores    NEUROLOGICAL: Denies headache and syncope.    PSYCHIATRIC: Denies recent changes in mood. Denies anxiety and depression.     Physical Exam  General: Pt is alert and oriented x 3. Pleasant, conversive  HEENT: Head is atraumatic, normocephalic. PERRL. No cervical bruits  Cardiac: Normal S1-S2.  Regular rate and rhythm.  No murmurs.  Respiratory: Lungs clear to auscultation.  No adventitious sounds.  Abdomen: Soft, nondistended, nontender.  Bowel sounds x4 quadrants.  Pulse exam: Palpable brachial and radial pulses bilaterall.  Lower extremities are warm and well-perfused  Extremities: Right upper extremity has moderate edema.  Tubigrip in  "place.  Mild ecchymosis upper arm.  Positive bruit to upper extremity graft  Neuro: Moves all extremities spontaneously.  No focal deficits.  Psych: Appropriate affect.  Answers questions appropriately.     Last Recorded Vitals  Blood pressure 122/51, pulse 63, temperature 36.4 °C (97.5 °F), temperature source Temporal, resp. rate 16, height 1.676 m (5' 6\"), weight 73.7 kg (162 lb 7.7 oz), SpO2 95%.    Relevant Results  EKG 12 lead    Result Date: 6/26/2024  Normal sinus rhythm Left axis deviation Septal infarct , age undetermined Nonspecific intraventricular block LBBB pattern I AVL Abnormal ECG Confirmed by Gerard Luna ([id number]) on 6/26/2024 8:49:16 PM      Results for orders placed or performed during the hospital encounter of 06/26/24 (from the past 24 hour(s))   POCT GLUCOSE   Result Value Ref Range    POCT Glucose 164 (H) 74 - 99 mg/dL   POCT GLUCOSE   Result Value Ref Range    POCT Glucose 262 (H) 74 - 99 mg/dL   Basic Metabolic Panel   Result Value Ref Range    Glucose 235 (H) 65 - 99 mg/dL    Sodium 135 133 - 145 mmol/L    Potassium 5.1 3.4 - 5.1 mmol/L    Chloride 101 97 - 107 mmol/L    Bicarbonate 24 24 - 31 mmol/L    Urea Nitrogen 32 (H) 8 - 25 mg/dL    Creatinine 2.90 (H) 0.40 - 1.60 mg/dL    eGFR 17 (L) >60 mL/min/1.73m*2    Calcium 7.9 (L) 8.5 - 10.4 mg/dL    Anion Gap 10 <=19 mmol/L   CBC   Result Value Ref Range    WBC 9.5 4.4 - 11.3 x10*3/uL    nRBC 0.0 0.0 - 0.0 /100 WBCs    RBC 4.24 4.00 - 5.20 x10*6/uL    Hemoglobin 11.6 (L) 12.0 - 16.0 g/dL    Hematocrit 39.1 36.0 - 46.0 %    MCV 92 80 - 100 fL    MCH 27.4 26.0 - 34.0 pg    MCHC 29.7 (L) 32.0 - 36.0 g/dL    RDW 15.4 (H) 11.5 - 14.5 %    Platelets 254 150 - 450 x10*3/uL   POCT GLUCOSE   Result Value Ref Range    POCT Glucose 207 (H) 74 - 99 mg/dL   POCT GLUCOSE   Result Value Ref Range    POCT Glucose 163 (H) 74 - 99 mg/dL         Assessment/Plan   End-stage renal disease requiring hemodialysis  Congestive heart failure  Type 2 " diabetes    Patient was admitted for observation post AV graft placement in the setting of her congestive heart failure.  She was seen by cardiology as well as the hospitalist.  She is asymptomatic with regards to her heart failure.  Discussed with Dr. Kaminski as well as Lucian Solano CNP.  Okay to discharge from vascular standpoint with 2-week follow-up for incision check.  Keep Tubigrip on arm.      I spent 35 minutes in the professional and overall care of this patient.      Lewis Norwood, APRN-CNP

## 2024-06-27 NOTE — TELEPHONE ENCOUNTER
Patient is active with [facility], followed by Poly Diaz NP. Patient admitted to [facility] 6/26/24. PMH: ESRD on dialysis TTS. Per H/P: She came in for AV shunt creation by Dr. Haddad. Procedure was successful and patient was admitted afterwards for observation.  Will monitor hospitalization and discharge plan.

## 2024-06-27 NOTE — DISCHARGE SUMMARY
"Discharge Diagnosis  CKD (chronic kidney disease) stage V requiring chronic dialysis (Multi)    Issues Requiring Follow-Up  Follow up with PCP and Dr. Mitchell    Discharge Meds     Your medication list        CHANGE how you take these medications        Instructions Last Dose Given Next Dose Due   HumaLOG KwikPen Insulin 100 unit/mL injection  Generic drug: insulin lispro  What changed: additional instructions      up to 15 units Subcutaneous three times a day per sliding scaleup to 15 units Subcutaneous three times a day per sliding scale              CONTINUE taking these medications        Instructions Last Dose Given Next Dose Due   acetaminophen 500 mg tablet  Commonly known as: Tylenol           albuterol 90 mcg/actuation inhaler           amiodarone 200 mg tablet  Commonly known as: Pacerone      Take 1 tablet (200 mg) by mouth once daily with breakfast.       aspirin 81 mg EC tablet           atorvastatin 40 mg tablet  Commonly known as: Lipitor           BD Calista 2nd Gen Pen Needle 32 gauge x 5/32\" needle  Generic drug: pen needle, diabetic           clopidogrel 75 mg tablet  Commonly known as: Plavix           ferrous gluconate 324 (38 Fe) mg tablet  Commonly known as: Fergon      Take 1 tablet (324 mg) by mouth once daily with breakfast.       FreeStyle Shakir 14 Day Sensor kit  Generic drug: flash glucose sensor kit      USE AS DIRECTED TO CHECK SUGARS 3 TO 4 TIMES DAILY       FreeStyle Shakir 2 [place] misc  Generic drug: flash glucose scanning reader      Use as instructed       FreeStyle Shakir 3 [place] misc  Generic drug: blood-glucose meter,continuous      Use as instructed       FreeStyle Shakir 3 Sensor device  Generic drug: blood-glucose sensor      Change sensor Q 14 days       Lantus Solostar U-100 Insulin 100 unit/mL (3 mL) pen  Generic drug: insulin glargine      Inject 10 Units under the skin once daily at bedtime.       levothyroxine 200 mcg tablet  Commonly known as: Synthroid, Levoxyl      " Take 1 tablet (200 mcg) by mouth once daily in the morning. Take before meals.       levothyroxine 50 mcg tablet  Commonly known as: Synthroid, Levoxyl      Take 1 tablet (50 mcg) by mouth once daily in the morning. Take before meals.       metoprolol succinate XL 50 mg 24 hr tablet  Commonly known as: Toprol-XL           oxygen DME/Hospice therapy  Commonly known as: O2           pantoprazole 40 mg EC tablet  Commonly known as: ProtoNix      Take 1 tablet (40 mg) by mouth 2 times a day.       venlafaxine XR 75 mg 24 hr capsule  Commonly known as: Effexor-XR      Take 2 capsules (150 mg) by mouth once daily.                Test Results Pending At Discharge  Pending Labs       No current pending labs.            Hospital Course  Daphne Morris is a 69 y.o. female presenting for AV shunt creation; hospitalized afterwards for observation.  Patient had no complications after surgery.  She had some mild shoulder pain on the side of her surgery.  She underwent dialysis in the morning.  She was seen by Dr. Mitchell and cardiology.  She was cleared for discharge by both parties.  She is discharged home.  She can follow-up with her normal providers. Discussed with vascular surgery team.    Pertinent Physical Exam At Time of Discharge  Physical Exam  General: alert, no diaphoresis   Lungs: CTA BL   Heart: RRR, no LE edema BL   GI: abdomen soft, nontender, nondistended, BS present   MSK: no joint effusion or deformity   Skin: no rashes, erythema, or ecchymosis   Neuro: grossly normal cognition, motor strength, sensation    Outpatient Follow-Up  Future Appointments   Date Time Provider Department Center   6/27/2024  3:30 PM Lesley Enamorado PTA [facility]   6/28/2024 11:00 AM Rose Guillaume RN [facility]   7/2/2024 To Be Determined Lesley Enamorado PTA [facility]   7/3/2024 To Be Determined Rose Guillaume RN [facility]   7/5/2024 To Be Determined Tony Greenwood PT [facility]   7/11/2024  9:40 AM Mariela Martin APRN-CNP [id number]   7/17/2024 10:30 AM CARLENE King-CNP [id number]     Discharge time spent: 35 min    Jackelyn Kaminski DO

## 2024-06-27 NOTE — PROGRESS NOTES
Occupational Therapy                 Therapy Communication Note    Patient Name: Daphne Morris  MRN: [id number]  Today's Date: 6/27/2024     Discipline: Occupational Therapy    Missed Visit Reason: Patient in a medical procedure (Pt is off the floor at dialysis.)    Missed Time: Cancel

## 2024-06-27 NOTE — PRE-PROCEDURE NOTE
..Report from Sending RN:    Report From: Sadie  Recent Surgery of Procedure: Yes, JOSIAH shunt placed 6/26/24  Baseline Level of Consciousness (LOC): A&Ox4  Oxygen Use: Yes  Type: 3L nasal cannula  Diabetic: Yes  Last BP Med Given Day of Dialysis: no  Last Pain Med Given: 6/27/24 tramadol  Lab Tests to be Obtained with Dialysis: No  Blood Transfusion to be Given During Dialysis: No  Available IV Access: Yes  Medications to be Administered During Dialysis: No  Continuous IV Infusion Running: No  Restraints on Currently or in the Last 24 Hours: No  Hand-Off Communication: verbal  Dialysis Catheter Dressing: yes  Last Dressing Change: will access upon arrival

## 2024-06-27 NOTE — PROGRESS NOTES
Physical Therapy                 Therapy Communication Note    Patient Name: Daphne Morris  MRN: [id number]  Today's Date: 6/27/2024     Discipline: Physical Therapy    Missed Visit Reason: Patient in a medical procedure. Pt off unit for HD.    Missed Time: Attempted at 10;27.

## 2024-06-27 NOTE — POST-PROCEDURE NOTE
..Report to Receiving RN:    Report To: BEATRICE De La Cruz  Time Report Called: 1240  Hand-Off Communication: verbal  Complications During Treatment: Yes, system clotted and blood loss of 150cc.  Ultrafiltration Treatment: No  Medications Administered During Dialysis: No  Blood Products Administered During Dialysis: No  Labs Sent During Dialysis: No  Heparin Drip Rate Changes: N/A  Dialysis Catheter Dressing: yes  Last Dressing Change: 06/27/24    Electronic Signatures:   (Signed )   Authored:    (Signed )   Authored:     Last Updated: 1:18 PM by FREEDOM FREEMAN

## 2024-06-27 NOTE — CONSULTS
Inpatient consult to Cardiology  Consult performed by: CARLENE Allison-CNP  Consult ordered by: Jamee Haddad MD  Reason for consult: Chronic congestive heart failure        History Of Present Illness:  Daphne Morris is a 69 y.o. female presenting with dialysis catheter placement.  Current with Dr. Peterson.  Past medical history of end-stage renal disease with hemodialysis, paroxysmal atrial fibrillation with recent Watchman device placement, diastolic heart failure, moderate pulm hypertension, chronic small pericardial effusion, diabetes mellitus, CVA, hypertensive disorder, hyperlipidemia, peripheral vascular disease, and COPD.  The patient presented to the hospital for scheduled dialysis catheter placement.  Underwent this procedure yesterday.  Doing well.  Chest x-ray reveals very mild vascular congestion prompting heart consult.  Patient reports no symptoms of this.  Chest pain-free.  Breathing comfortably room air.     Last Recorded Vitals:  Vitals:    06/26/24 2312 06/26/24 2345 06/27/24 0327 06/27/24 0732   BP: 158/65  153/62 149/55   BP Location: Right arm  Right arm Left arm   Patient Position: Lying  Lying Lying   Pulse:       Resp: 18 18 18   Temp: 36.5 °C (97.7 °F)  36.4 °C (97.5 °F) 36.4 °C (97.5 °F)   TempSrc: Oral  Temporal Temporal   SpO2: 96% 97% 96% 96%   Weight:   73.7 kg (162 lb 7.7 oz)    Height:           Last Labs:  CBC - 6/27/2024:  5:04 AM  9.5 11.6 254    39.1      CMP - 6/27/2024:  5:04 AM  7.9 6.1 11 --- <0.2   4.5 3.0 5 136      PTT - 5/17/2024:  8:40 AM  1.0   11.0 70     Troponin I, High Sensitivity   Date/Time Value Ref Range Status   02/18/2024 05:54 PM 40 (H) 0 - 13 ng/L Final   02/18/2024 04:21 PM 43 (H) 0 - 13 ng/L Final   02/01/2024 05:15 AM 18 (H) 0 - 13 ng/L Final     BNP   Date/Time Value Ref Range Status   02/21/2024 06:14 AM 1,443 (H) 0 - 99 pg/mL Final   02/18/2024 05:36 PM 1,444 (H) 0 - 99 pg/mL Final     Hemoglobin A1C   Date/Time Value Ref Range Status    03/06/2024 06:45 AM 7.7 (H) 4.7 - 6.4 % Final     Comment:     Interpretation:     Standardized A1c  Good control or normal:  4-6% ( mg/dL avg)  Moderate control:        6.1-8.0% (120-180 mg/dL avg)  Poor control:            >8.0% (180 mg/dL avg)  With 4% as a baseline, each 1% increase = 30 mg/dL increase in average   glucose.  Taken from DCCT (Diabetes Control Complications Trial)   02/16/2024 05:00 AM 7.9 (H) 4.7 - 6.4 % Final     Comment:     Interpretation:     Standardized A1c  Good control or normal:  4-6% ( mg/dL avg)  Moderate control:        6.1-8.0% (120-180 mg/dL avg)  Poor control:            >8.0% (180 mg/dL avg)  With 4% as a baseline, each 1% increase = 30 mg/dL increase in average   glucose.  Taken from DCCT (Diabetes Control Complications Trial)   10/18/2023 01:15 PM 9.4 (H) see below % Final     LDL Calculated   Date/Time Value Ref Range Status   02/23/2023 11:13 AM 77 65 - 130 MG/DL Final   04/07/2021 06:26  (H) 65 - 130 MG/DL Final   07/15/2020 09:12 AM 78 65 - 130 MG/DL Final     VLDL   Date/Time Value Ref Range Status   04/27/2023 06:24 AM 20 0 - 40 mg/dL Final      Results for orders placed or performed during the hospital encounter of 06/26/24 (from the past 24 hour(s))   POCT GLUCOSE   Result Value Ref Range    POCT Glucose 191 (H) 74 - 99 mg/dL   POCT GLUCOSE   Result Value Ref Range    POCT Glucose 112 (H) 74 - 99 mg/dL   POCT GLUCOSE   Result Value Ref Range    POCT Glucose 164 (H) 74 - 99 mg/dL   POCT GLUCOSE   Result Value Ref Range    POCT Glucose 262 (H) 74 - 99 mg/dL   Basic Metabolic Panel   Result Value Ref Range    Glucose 235 (H) 65 - 99 mg/dL    Sodium 135 133 - 145 mmol/L    Potassium 5.1 3.4 - 5.1 mmol/L    Chloride 101 97 - 107 mmol/L    Bicarbonate 24 24 - 31 mmol/L    Urea Nitrogen 32 (H) 8 - 25 mg/dL    Creatinine 2.90 (H) 0.40 - 1.60 mg/dL    eGFR 17 (L) >60 mL/min/1.73m*2    Calcium 7.9 (L) 8.5 - 10.4 mg/dL    Anion Gap 10 <=19 mmol/L   CBC   Result  Value Ref Range    WBC 9.5 4.4 - 11.3 x10*3/uL    nRBC 0.0 0.0 - 0.0 /100 WBCs    RBC 4.24 4.00 - 5.20 x10*6/uL    Hemoglobin 11.6 (L) 12.0 - 16.0 g/dL    Hematocrit 39.1 36.0 - 46.0 %    MCV 92 80 - 100 fL    MCH 27.4 26.0 - 34.0 pg    MCHC 29.7 (L) 32.0 - 36.0 g/dL    RDW 15.4 (H) 11.5 - 14.5 %    Platelets 254 150 - 450 x10*3/uL   POCT GLUCOSE   Result Value Ref Range    POCT Glucose 207 (H) 74 - 99 mg/dL       Last I/O:  I/O last 3 completed shifts:  In: 1590 (21.6 mL/kg) [P.O.:1090; IV Piggyback:500]  Out: 300 (4.1 mL/kg) [Urine:300 (0.1 mL/kg/hr)]  Weight: 73.7 kg     Past Cardiology Tests (Last 3 Years):  EKG:  EKG 12 lead 06/26/2024: Sinus rhythm with left bundle branch block and left axis deviation with evidence of septal infarct    Echo:  Transthoracic Echo (TTE) Complete 02/05/2024    Transthoracic Echo (TTE) Complete    Result Date: 2/5/2024   [facility], [address] [place] [address]               Tel [phone] and Fax [phone] TRANSTHORACIC ECHOCARDIOGRAM REPORT  Patient Name:      MOE Lizarraga Physician:    [id number] Nicholas Antonio MD Study Date:        2/5/2024             Ordering Provider:    [id number] DOMONIQUE PURCELL MRN/PID:           [id number]             Fellow: Accession#:        [id number]         Nurse: Date of Birth/Age: 1955 / 68 years Sonographer:          Nina Angel RDCS Gender:            F                    Additional Staff: Height:            167.64 cm            Admit Date:           2/2/2024 Weight:            102.51 kg            Admission Status:     Inpatient -                                                               Routine BSA:               2.11 m2              Encounter#:           [id number]                                          Department Location:  [facility]                                                               Invasive Blood Pressure: 147 /65 mmHg Study Type:    TRANSTHORACIC ECHO (TTE) COMPLETE Diagnosis/ICD: Acute combined systolic (congestive) and diastolic (congestive)                heart failure (CHF)-I50.41 Indication:    Congestive Heart Failure CPT Code:      Echo Complete w Full Doppler-01087 Patient History: Diabetes:         Yes Pertinent         A-Fib, HTN, Dyspnea and LE Edema. Watchman device, elevated History:          BNP,. Study Detail: The following Echo studies were performed: 2D, M-Mode, Doppler and               color flow.  PHYSICIAN INTERPRETATION: Left Ventricle: The left ventricular systolic function is normal, with an estimated ejection fraction of 60-65%. There are no regional wall motion abnormalities. The left ventricular cavity size is normal. Spectral Doppler shows an impaired relaxation pattern of left ventricular diastolic filling. Left Atrium: The left atrium is moderately dilated. Right Ventricle: The right ventricle is mildly enlarged. There is mildly reduced right ventricular systolic function. Right Atrium: The right atrium is moderately dilated. Aortic Valve: The aortic valve is trileaflet. There is mild aortic valve cusp calcification. There is trivial aortic valve regurgitation. The peak instantaneous gradient of the aortic valve is 14.1 mmHg. The mean gradient of the aortic valve is 9.1 mmHg. Mitral Valve: The mitral valve is normal in structure. There is mild mitral annular calcification. There is mild to moderate mitral valve regurgitation. Tricuspid Valve: The tricuspid valve is structurally normal. There is moderate tricuspid regurgitation. Pulmonic Valve: The pulmonic valve is structurally normal. There is mild pulmonic valve regurgitation. Pericardium: There is a small to moderate pericardial effusion posterior to the left ventricle. There is no evidence of cardiac  tamponade. Aorta: The aortic root is normal. Pulmonary Artery: The tricuspid regurgitant velocity is 3.04 m/s, and with an estimated right atrial pressure of 3 mmHg, the estimated pulmonary artery pressure is mildly elevated with the RVSP at 39.9 mmHg. Pulmonary Veins: The pulmonary veins appear normal and return normally to the left atrium. Systemic Veins: The inferior vena cava appears to be of normal size. There is IVC inspiratory collapse greater than 50%.  CONCLUSIONS:  1. Left ventricular systolic function is normal with a 60-65% estimated ejection fraction.  2. Spectral Doppler shows an impaired relaxation pattern of left ventricular diastolic filling.  3. There is mildly reduced right ventricular systolic function.  4. The left atrium is moderately dilated.  5. The right atrium is moderately dilated.  6. There is a small to moderate pericardial effusion.  7. There is no evidence of cardiac tamponade.  8. Mild to moderate mitral valve regurgitation.  9. Moderate tricuspid regurgitation visualized. 10. Normal CVP. Estimated PASP around 45 mm Hg. QUANTITATIVE DATA SUMMARY: 2D MEASUREMENTS:                           Normal Ranges: IVSd:          1.52 cm    (0.6-1.1cm) LVPWd:         1.42 cm    (0.6-1.1cm) LVIDd:         4.35 cm    (3.9-5.9cm) LVIDs:         2.94 cm LV Mass Index: 120.7 g/m2 LV % FS        32.3 % LA VOLUME:                              Normal Ranges: LA Vol A4C:       79.5 ml    (22+/-6mL/m2) LA Vol A2C:       78.2 ml LA Vol BP:        79.7 ml LA Vol Index A4C: 37.7ml/m2 LA Vol Index A2C: 37.1 ml/m2 LA Vol Index BP:  37.9 ml/m2 LA Volume Index:  37.8 ml/m2 LA Vol A4C:       76.0 ml LA Vol A2C:       73.9 ml RA VOLUME BY A/L METHOD:                       Normal Ranges: RA Area A4C: 17.8 cm2 M-MODE MEASUREMENTS:                  Normal Ranges: Ao Root: 3.00 cm (2.0-3.7cm) LAs:     5.08 cm (2.7-4.0cm) LV SYSTOLIC FUNCTION BY 2D PLANIMETRY (MOD):                     Normal Ranges: EF-A4C View: 63.4 %  (>=55%) EF-A2C View: 63.8 % EF-Biplane:  62.5 % LV DIASTOLIC FUNCTION:                             Normal Ranges: MV Peak E:      1.33 m/s    (0.7-1.2 m/s) MV Peak A:      0.53 m/s    (0.42-0.7 m/s) E/A Ratio:      2.52        (1.0-2.2) MV e'           0.06 m/s    (>8.0) MV lateral e'   0.06 m/s MV medial e'    0.06 m/s MV A Dur:       110.73 msec E/e' Ratio:     22.09       (<8.0) PulmV Sys Mike:  53.97 cm/s PulmV Dugan Mike: 77.63 cm/s PulmV S/D Mike:  0.70 MITRAL VALVE:                 Normal Ranges: MV DT: 245 msec (150-240msec) MITRAL INSUFFICIENCY:                         Normal Ranges: MR VTI:     171.38 cm MR Vmax:    489.73 cm/s MR Volume:  20.07 ml MR Flow Rt: 57.36 ml/s MR EROA:    0.12 cm2 AORTIC VALVE:                                    Normal Ranges: AoV Vmax:                1.88 m/s  (<=1.7m/s) AoV Peak P.1 mmHg (<20mmHg) AoV Mean P.1 mmHg  (1.7-11.5mmHg) LVOT Max Mike:            0.97 m/s  (<=1.1m/s) AoV VTI:                 46.22 cm  (18-25cm) LVOT VTI:                24.89 cm LVOT Diameter:           1.96 cm   (1.8-2.4cm) AoV Area, VTI:           1.62 cm2  (2.5-5.5cm2) AoV Area,Vmax:           1.56 cm2  (2.5-4.5cm2) AoV Dimensionless Index: 0.54  RIGHT VENTRICLE: RV Basal 3.80 cm RV Mid   2.80 cm RV Major 8.0 cm TAPSE:   18.0 mm RV s'    0.13 m/s TRICUSPID VALVE/RVSP:                             Normal Ranges: Peak TR Velocity: 3.04 m/s RV Syst Pressure: 39.9 mmHg (< 30mmHg) PULMONIC VALVE:                      Normal Ranges: PV Max Mike: 1.0 m/s  (0.6-0.9m/s) PV Max PG:  3.9 mmHg Pulmonary Veins: PulmV Dugan Mike: 77.63 cm/s PulmV S/D Mike:  0.70 PulmV Sys Mike:  53.97 cm/s  [id number] Nicholas Antonio MD Electronically signed on 2024 at 5:06:05 PM  ** Final **       Ejection Fractions:  EF   Date/Time Value Ref Range Status   2024 01:52 PM 63 %      Cath:  Cardiac catheterization - non-coronary 2024: Watchman device placement    Past Medical History:  She  has a past medical history of Anal fissure (10/12/2023), Anemia, ASHD (arteriosclerotic heart disease), At risk for falls, Atrial fibrillation (Multi), CHF (congestive heart failure) (Multi), CKD (chronic kidney disease), COPD (chronic obstructive pulmonary disease) (Multi), CVA (cerebral vascular accident) (Multi), Depression, Diabetes mellitus (Multi), GERD (gastroesophageal reflux disease), H/O pleural effusion, Hyperlipidemia, Hypertension, Hypothyroidism, Hypoxia, Impacted cerumen, left ear, Noncompliance, Osteoarthritis, Perianal dermatitis (10/12/2023), Personal history of other diseases of the respiratory system, PVD (peripheral vascular disease) (CMS-HCC), Renal carcinoma, right (Multi), Respiratory failure (Multi), TIA (transient ischemic attack), Vasculitis (CMS-HCC) (10/12/2023), and Weakness.    Past Surgical History:  She has a past surgical history that includes MR angio chest w and wo IV contrast (2023); MR angio head wo IV contrast (2021);  section, classic; Shoulder surgery; Ankle surgery; Cataract extraction (Right); Nephrectomy (2021); and Cardiac catheterization (N/A, 2024).      Social History:  She reports that she has been smoking cigarettes. She started smoking about 55 years ago. She has a 27.7 pack-year smoking history. She has never been exposed to tobacco smoke. She has never used smokeless tobacco. She reports that she does not currently use alcohol. She reports that she does not currently use drugs after having used the following drugs: Marijuana.    Family History:  Family History   Problem Relation Name Age of Onset    Hypertension Mother      Diabetes Father      Heart disease Father      Cancer Sister      Cancer Brother      Diabetes Daughter      Asthma Daughter      Heart attack Daughter      Diabetes Maternal Grandfather      Heart disease Paternal Grandmother      Diabetes Other      Hypertension Other      COPD Other      Other (chronic lung  "disease) Other          Allergies:  Bee venom protein (honey bee); Citalopram; Codeine; Influenza virus vaccines; Latex; Latex, natural rubber; Lisinopril; Penicillins; Adhesive tape-silicones; Amoxicillin; Doxycycline; Oseltamivir; Phenyleph-min oil-petrolatum; Phenyleph-shark oil-glyc-pet; Phenylephrine; Prednisone; Tuberculin ppd; and Xylometazoline    Inpatient Medications:  Scheduled medications   Medication Dose Route Frequency    acetaminophen  1,000 mg oral BID    amiodarone  200 mg oral Daily with breakfast    [Held by provider] aspirin  81 mg oral Daily    atorvastatin  40 mg oral Daily    [Held by provider] clopidogrel  75 mg oral Daily    heparin (porcine)  5,000 Units subcutaneous q8h    insulin glargine  10 Units subcutaneous Nightly    insulin lispro  0-5 Units subcutaneous TID    levothyroxine  200 mcg oral Daily before breakfast    levothyroxine  50 mcg oral Daily before breakfast    metoprolol succinate XL  50 mg oral Daily    oxygen   inhalation Nightly    oxygen   inhalation Continuous - Inhalation    pantoprazole  40 mg oral BID    venlafaxine XR  150 mg oral Daily     PRN medications   Medication    albuterol    dextrose    dextrose    glucagon    glucagon    ondansetron    polyethylene glycol    traMADol     Continuous Medications   Medication Dose Last Rate     Outpatient Medications:  Current Outpatient Medications   Medication Instructions    acetaminophen (TYLENOL) 1,000 mg, oral, 2 times daily    albuterol 90 mcg/actuation inhaler 2 puffs, inhalation, Every 4 hours PRN    amiodarone (PACERONE) 200 mg, oral, Daily with breakfast    aspirin 81 mg, oral, Daily    atorvastatin (LIPITOR) 40 mg, oral, Daily    BD Calista 2nd Gen Pen Needle 32 gauge x 5/32\" needle USE SUBCUTANEOUSLY AS DIRECTED TWICE DAILY    clopidogrel (PLAVIX) 75 mg, oral, Daily    ferrous gluconate (FERGON) 324 mg, oral, Daily with breakfast    FreeStyle Shakir 14 Day Sensor kit USE AS DIRECTED TO CHECK SUGARS 3 TO 4 TIMES DAILY "    FreeStyle Shakir 2 [place] misc Use as instructed    FreeStyle Shakir 3 [place] misc Use as instructed    FreeStyle Shakir 3 Sensor device Change sensor Q 14 days    HumaLOG KwikPen Insulin 100 unit/mL injection up to 15 units Subcutaneous three times a day per sliding scaleup to 15 units Subcutaneous three times a day per sliding scale    Lantus Solostar U-100 Insulin 10 Units, subcutaneous, Nightly    levothyroxine (SYNTHROID, LEVOXYL) 200 mcg, oral, Daily before breakfast    levothyroxine (SYNTHROID, LEVOXYL) 50 mcg, oral, Daily before breakfast    metoprolol succinate XL (TOPROL-XL) 50 mg, oral, Daily, Do not crush or chew.    oxygen DME/Hospice (O2) 3 L/min, inhalation, Nightly    pantoprazole (PROTONIX) 40 mg, oral, 2 times daily    venlafaxine XR (EFFEXOR-XR) 150 mg, oral, Daily       Physical Exam:  General: alert, oriented x 3, very pleasant, sitting up in bed receiving dialysis eating breakfast  HEENT: normal cephalic, atraumatic  Neck: No JVD, bruit or thrill, masses or tenderness   Heart: S1/S2, Rate 70, Rhythm regular, no s3 or s4, no murmur, thrill, or heaves at PMI.   Lungs: Clear, diminished in bases equal expansion and excursion, no wheezes, crackles, rhales or rhonci. room air.  No conversational dyspnea appreciated.  No tachypnea.  No pain with deep inspiration  Abdomen: bowel sounds x 4, soft, non-tender to light and deep palpation, No masses, guarding, or CVA tenderness   Genitourinary: deferred   Extremities: No significant upper or lower extremity edema appreciated       Assessment/Plan     Dialysis catheter placement  End-stage renal disease with hemodialysis  Chronic diastolic heart failure  Paroxysmal atrial fibrillation  Moderate pulmonary hypertension  Hypertensive disorder  Hyperlipidemia  History of smoking  History of stroke    Overall impression:    6/27: As above, presented for dialysis catheter placement.  Chest x-ray taken post placement revealed a possible mild vascular congestion.   Consulted for this.  I believe this is an over read post procedure.  On assessment the patient is euvolemic.  There is no significant peripheral edema or JVD.  She is currently receiving dialysis.  She makes very little urine and her fluid volume status is maintained by dialysis.  Patient reports a history in the past of significant peripheral edema, however states this has been stable for quite some time since her dialysis has been going well.  Reports no chest pain or pressure, palpitations or feeling of rapid heart rate.  There is no significant events noted on telemetry monitor.  Her blood pressure has been stable albeit mildly hypertensive at 149/55.  Her current room air pulse ox is 96%.  Overall the patient appears to be very stable from a cardiac perspective.  She did have a recent echocardiogram in February of this year with a preserved ejection fraction and minimal diastolic dysfunction.  Did note moderate pulmonary hypertension.  Additionally earlier this year she did have a Watchman device placed for her paroxysmal atrial fibrillation.  Overall stable.  She should follow-up with her cardiologist in the outpatient setting per current outpatient schedule.  Will sign off.      Code Status:  Full Code    I spent 60 minutes in the professional and overall care of this patient.        Scottie Solano, APRN-CNP

## 2024-06-27 NOTE — CONSULTS
CONSULT: Nephrology SERVICE    SERVICE DATE: 6/27/2024   SERVICE TIME:  9:52 AM    REASON FOR CONSULT: End-stage renal disease management  REQUESTING PHYSICIAN: Dr. Kaminski  PRIMARY CARE PHYSICIAN: GREGORIA King    ASSESSMENT AND PLAN  69-year-old dialysis patient admitted after elective right-sided fistula creation.  1.  End-stage renal disease    Hemodialysis today in accordance with her usual Tuesday, Thursday, Saturday schedule.  I have no medical reason to suggest that she needs to stay in the hospital after treatment today.  Case discussed with Dr. Kaminski.  Case also discussed with vascular surgery and cardiology consulting team.   I will follow her in the outpatient setting.  Thank you for this consultation.    SUBJECTIVE  Ms. Morris is a 69 y.o. woman on dialysis admitted after elective right-sided fistula creation, consulted for dialysis management.  This patient endorses no complaints.  I saw her on dialysis.  Her procedure was done yesterday.  Her regular hemodialysis days are Tuesday, Thursday, Saturday for 4 hours at [facility] in [place].  She has been using a right internal jugular hemodialysis catheter.  I verified with the dialysis unit that it has been running without remark.  Unfortunately, during treatment today, the dialysis technicians tell me that the catheter is very positional.  The patient has some pain at her surgical site, otherwise no complaints, no dyspnea.    PAST MEDICAL HISTORY:   Past Medical History:   Diagnosis Date    Anal fissure 10/12/2023    Anemia     ASHD (arteriosclerotic heart disease)     At risk for falls     Atrial fibrillation (Multi)     CHF (congestive heart failure) (Multi)     CKD (chronic kidney disease)     COPD (chronic obstructive pulmonary disease) (Multi)     CVA (cerebral vascular accident) (Multi)     2021- right-sided weakness    Depression     Diabetes mellitus (Multi)     GERD (gastroesophageal reflux disease)     H/O pleural effusion      Hyperlipidemia     Hypertension     Hypothyroidism     Hypoxia     Impacted cerumen, left ear     Impacted cerumen of left ear    Noncompliance     Osteoarthritis     Perianal dermatitis 10/12/2023    Personal history of other diseases of the respiratory system     History of acute bronchitis    PVD (peripheral vascular disease) (CMS-HCC)     Renal carcinoma, right (Multi)     s/p partial nephrectomy 2021    Respiratory failure (Multi)     TIA (transient ischemic attack)     Vasculitis (CMS-HCC) 10/12/2023    Weakness      PAST SURGICAL HISTORY:   Past Surgical History:   Procedure Laterality Date    ANKLE SURGERY          CARDIAC CATHETERIZATION N/A 2024    Procedure: Right Heart Cath;  Surgeon: Nicholas Antonio MD;  Location: [facility] Cardiac Cath Lab;  Service: Cardiovascular;  Laterality: N/A;    CATARACT EXTRACTION Right     2019     SECTION, CLASSIC      MR CHEST ANGIO W AND WO IV CONTRAST  2023    MR CHEST ANGIO W AND WO IV CONTRAST 2023 [facility]    MR HEAD ANGIO WO IV CONTRAST  2021    MR HEAD ANGIO WO IV CONTRAST [facility]    NEPHRECTOMY  2021    SHOULDER SURGERY           FAMILY HISTORY:   Family History   Problem Relation Name Age of Onset    Hypertension Mother      Diabetes Father      Heart disease Father      Cancer Sister      Cancer Brother      Diabetes Daughter      Asthma Daughter      Heart attack Daughter      Diabetes Maternal Grandfather      Heart disease Paternal Grandmother      Diabetes Other      Hypertension Other      COPD Other      Other (chronic lung disease) Other       SOCIAL HISTORY:   Social History     Tobacco Use    Smoking status: Every Day     Current packs/day: 0.50     Average packs/day: 0.5 packs/day for 55.5 years (27.7 ttl pk-yrs)     Types: Cigarettes     Start date: 1969     Passive exposure: Never    Smokeless tobacco: Never   Vaping Use    Vaping status: Never Used   Substance Use Topics     Alcohol use: Not Currently    Drug use: Not Currently     Types: Marijuana     MEDICATIONS:  acetaminophen, 1,000 mg, oral, BID  amiodarone, 200 mg, oral, Daily with breakfast  [Held by provider] aspirin, 81 mg, oral, Daily  atorvastatin, 40 mg, oral, Daily  [Held by provider] clopidogrel, 75 mg, oral, Daily  heparin (porcine), 5,000 Units, subcutaneous, q8h  insulin glargine, 10 Units, subcutaneous, Nightly  insulin lispro, 0-5 Units, subcutaneous, TID  levothyroxine, 200 mcg, oral, Daily before breakfast  levothyroxine, 50 mcg, oral, Daily before breakfast  metoprolol succinate XL, 50 mg, oral, Daily  oxygen, , inhalation, Nightly  oxygen, , inhalation, Continuous - Inhalation  pantoprazole, 40 mg, oral, BID  venlafaxine XR, 150 mg, oral, Daily           PRN medications: albuterol, dextrose, dextrose, glucagon, glucagon, ondansetron, polyethylene glycol, traMADol   CURRENT ALLERGIES:   Allergies as of 06/11/2024 - Reviewed 06/07/2024   Allergen Reaction Noted    Bee venom protein (honey bee) Swelling 10/03/2023    Citalopram Hives, Other, Rash, and Nausea/vomiting 09/21/2021    Codeine Headache, Other, and Itching 09/21/2021    Influenza virus vaccines Hives 04/27/2024    Latex Other 09/21/2021    Latex, natural rubber Other 09/21/2021    Lisinopril Swelling and Nausea/vomiting 09/21/2021    Penicillins Swelling, Headache, and Unknown 09/21/2021    Adhesive tape-silicones Other 09/21/2021    Amoxicillin Swelling and Rash 09/21/2021    Doxycycline Rash and Unknown 09/21/2021    Oseltamivir Rash and Nausea/vomiting 07/30/2023    Phenyleph-min oil-petrolatum Other 04/27/2024    Phenyleph-shark oil-glyc-pet Rash 10/03/2023    Phenylephrine Nausea/vomiting 03/21/2024    Prednisone Other and Nausea/vomiting 09/21/2021    Tuberculin ppd Unknown 03/21/2024    Xylometazoline Unknown 01/31/2024       COMPLETE REVIEW OF SYSTEMS:    Full ROS was negative unless mentioned above    OBJECTIVE  PHYSICAL EXAM  Heart Rate:  [63-68]    Temp:  [35.9 °C (96.6 °F)-37.1 °C (98.8 °F)]   Resp:  [13-18]   BP: (132-158)/(55-69)   Weight:  [73.7 kg (162 lb 7.7 oz)]   SpO2:  [92 %-100 %]    Body mass index is 26.22 kg/m².  Chronically ill-appearing elderly white woman  Appears older than her known age  Very pale skin  Hearing intact  Phonation intact  Moist mucosa  Systolic murmur  Right internal jugular tunneled hemodialysis catheter in place  Right upper extremity with bandages around the surgical site  Lungs are clear to auscultation bilaterally  Abdomen is soft, nondistended, nontender, positive bowel sounds  No Fay catheter in place, no suprapubic tenderness to palpation  No extremity edema  Moves 4 limbs spontaneously  No obvious joint deformities  No lymphadenopathy    DATA:   Labs:  Results for orders placed or performed during the hospital encounter of 06/26/24 (from the past 96 hour(s))   Comprehensive Metabolic Panel   Result Value Ref Range    Glucose 296 (H) 65 - 99 mg/dL    Sodium 137 133 - 145 mmol/L    Potassium 4.2 3.4 - 5.1 mmol/L    Chloride 100 97 - 107 mmol/L    Bicarbonate 29 24 - 31 mmol/L    Urea Nitrogen 25 8 - 25 mg/dL    Creatinine 2.30 (H) 0.40 - 1.60 mg/dL    eGFR 22 (L) >60 mL/min/1.73m*2    Calcium 8.4 (L) 8.5 - 10.4 mg/dL    Albumin 3.0 (L) 3.5 - 5.0 g/dL    Alkaline Phosphatase 136 (H) 35 - 125 U/L    Total Protein 6.1 5.9 - 7.9 g/dL    AST 11 5 - 40 U/L    Bilirubin, Total <0.2 0.1 - 1.2 mg/dL    ALT 5 5 - 40 U/L    Anion Gap 8 <=19 mmol/L   CBC   Result Value Ref Range    WBC 7.3 4.4 - 11.3 x10*3/uL    nRBC 0.0 0.0 - 0.0 /100 WBCs    RBC 4.68 4.00 - 5.20 x10*6/uL    Hemoglobin 12.7 12.0 - 16.0 g/dL    Hematocrit 43.3 36.0 - 46.0 %    MCV 93 80 - 100 fL    MCH 27.1 26.0 - 34.0 pg    MCHC 29.3 (L) 32.0 - 36.0 g/dL    RDW 15.3 (H) 11.5 - 14.5 %    Platelets 239 150 - 450 x10*3/uL   EKG 12 lead   Result Value Ref Range    Ventricular Rate 75 BPM    Atrial Rate 75 BPM    IL Interval 166 ms    QRS Duration 120 ms    QT  Interval 454 ms    QTC Calculation(Bazett) 506 ms    P Axis 70 degrees    R Axis -50 degrees    T Axis 94 degrees    QRS Count 13 beats    Q Onset 220 ms    P Onset 137 ms    P Offset 172 ms    T Offset 447 ms    QTC Fredericia 488 ms   POCT GLUCOSE   Result Value Ref Range    POCT Glucose 191 (H) 74 - 99 mg/dL   POCT GLUCOSE   Result Value Ref Range    POCT Glucose 112 (H) 74 - 99 mg/dL   POCT GLUCOSE   Result Value Ref Range    POCT Glucose 164 (H) 74 - 99 mg/dL   POCT GLUCOSE   Result Value Ref Range    POCT Glucose 262 (H) 74 - 99 mg/dL   Basic Metabolic Panel   Result Value Ref Range    Glucose 235 (H) 65 - 99 mg/dL    Sodium 135 133 - 145 mmol/L    Potassium 5.1 3.4 - 5.1 mmol/L    Chloride 101 97 - 107 mmol/L    Bicarbonate 24 24 - 31 mmol/L    Urea Nitrogen 32 (H) 8 - 25 mg/dL    Creatinine 2.90 (H) 0.40 - 1.60 mg/dL    eGFR 17 (L) >60 mL/min/1.73m*2    Calcium 7.9 (L) 8.5 - 10.4 mg/dL    Anion Gap 10 <=19 mmol/L   CBC   Result Value Ref Range    WBC 9.5 4.4 - 11.3 x10*3/uL    nRBC 0.0 0.0 - 0.0 /100 WBCs    RBC 4.24 4.00 - 5.20 x10*6/uL    Hemoglobin 11.6 (L) 12.0 - 16.0 g/dL    Hematocrit 39.1 36.0 - 46.0 %    MCV 92 80 - 100 fL    MCH 27.4 26.0 - 34.0 pg    MCHC 29.7 (L) 32.0 - 36.0 g/dL    RDW 15.4 (H) 11.5 - 14.5 %    Platelets 254 150 - 450 x10*3/uL   POCT GLUCOSE   Result Value Ref Range    POCT Glucose 207 (H) 74 - 99 mg/dL       SIGNATURE: Joseph Mitchell MD PATIENT NAME: Daphne Morris   DATE: June 27, 2024 MRN: [id number]   TIME: 9:52 AM PAGER: [phone]

## 2024-06-27 NOTE — TELEPHONE ENCOUNTER
ARMIDA Pang - Bedside visit with patient at [facility]. Plan is to discharge home today. Per DC summary: Patient had no complications after surgery. She had some mild shoulder pain on the side of her surgery. She underwent dialysis in the morning. She was seen by Dr. Mitchell and cardiology. She was cleared for discharge by both parties. To follow up with vascular in 2 weeks.

## 2024-06-27 NOTE — DISCHARGE INSTRUCTIONS
Follow up with your PCP and Dr. Mitchell as normal.    Continue dialysis on normally scheduled days.      Follow up with Dr. Haddad in 2 wks for incision check.

## 2024-06-27 NOTE — CARE PLAN
The patient's goals for the shift include To get ready to be discharged tomorrow morning    The clinical goals for the shift include To eat and get some rest

## 2024-06-28 ENCOUNTER — HOME CARE VISIT (OUTPATIENT)
Dept: HOME HEALTH SERVICES | Facility: HOME HEALTH | Age: 69
End: 2024-06-28
Payer: MEDICARE

## 2024-06-28 VITALS
TEMPERATURE: 97.8 F | SYSTOLIC BLOOD PRESSURE: 147 MMHG | OXYGEN SATURATION: 95 % | HEART RATE: 66 BPM | RESPIRATION RATE: 16 BRPM | DIASTOLIC BLOOD PRESSURE: 73 MMHG

## 2024-06-28 PROCEDURE — G0299 HHS/HOSPICE OF RN EA 15 MIN: HCPCS | Mod: HHH

## 2024-06-28 SDOH — ECONOMIC STABILITY: HOUSING INSECURITY: EVIDENCE OF SMOKING MATERIAL: 1

## 2024-06-28 SDOH — HEALTH STABILITY: MENTAL HEALTH: SMOKING IN HOME: 1

## 2024-06-28 ASSESSMENT — ENCOUNTER SYMPTOMS
DYSPNEA ACTIVITY LEVEL: AT REST
SHORTNESS OF BREATH: 1
CHANGE IN APPETITE: DECREASED
PAIN LOCATION - RELIEVING FACTORS: MEDICATION
PAIN LOCATION - PAIN DURATION: NEW ONSET
PAIN LOCATION: RIGHT ARM
PAIN: 1
VOMITING: 1
HIGHEST PAIN SEVERITY IN PAST 24 HOURS: 10/10
PAIN LOCATION - PAIN SEVERITY: 10/10
PERSON REPORTING PAIN: PATIENT
MUSCLE WEAKNESS: 1
LOWEST PAIN SEVERITY IN PAST 24 HOURS: 1/10
APPETITE LEVEL: FAIR
PAIN LOCATION - PAIN FREQUENCY: CONSTANT
PAIN LOCATION - PAIN QUALITY: SQUEEZING
SKIN LESIONS: 1

## 2024-06-28 ASSESSMENT — ACTIVITIES OF DAILY LIVING (ADL)
AMBULATION ASSISTANCE: STAND BY ASSIST
CURRENT_FUNCTION: STAND BY ASSIST

## 2024-07-01 ENCOUNTER — HOME CARE VISIT (OUTPATIENT)
Dept: HOME HEALTH SERVICES | Facility: HOME HEALTH | Age: 69
End: 2024-07-01
Payer: MEDICARE

## 2024-07-01 VITALS
RESPIRATION RATE: 18 BRPM | DIASTOLIC BLOOD PRESSURE: 80 MMHG | HEART RATE: 66 BPM | SYSTOLIC BLOOD PRESSURE: 128 MMHG | TEMPERATURE: 97.3 F

## 2024-07-01 PROCEDURE — G0299 HHS/HOSPICE OF RN EA 15 MIN: HCPCS | Mod: HHH

## 2024-07-01 ASSESSMENT — ENCOUNTER SYMPTOMS
LAST BOWEL MOVEMENT: 67021
MUSCLE WEAKNESS: 1
PERSON REPORTING PAIN: PATIENT
APPETITE LEVEL: FAIR
DENIES PAIN: 1
CHANGE IN APPETITE: UNCHANGED

## 2024-07-01 ASSESSMENT — ACTIVITIES OF DAILY LIVING (ADL)
CURRENT_FUNCTION: STAND BY ASSIST
OASIS_M1830: 03
ENTERING_EXITING_HOME: SUPERVISION
AMBULATION ASSISTANCE: STAND BY ASSIST

## 2024-07-02 ENCOUNTER — TELEPHONE (OUTPATIENT)
Dept: TRANSPLANT | Facility: HOSPITAL | Age: 69
End: 2024-07-02
Payer: MEDICARE

## 2024-07-02 ENCOUNTER — HOME CARE VISIT (OUTPATIENT)
Dept: HOME HEALTH SERVICES | Facility: HOME HEALTH | Age: 69
End: 2024-07-02
Payer: MEDICARE

## 2024-07-02 DIAGNOSIS — Z01.818 PRE-TRANSPLANT EVALUATION FOR KIDNEY TRANSPLANT: Primary | ICD-10-CM

## 2024-07-02 NOTE — TELEPHONE ENCOUNTER
Do you have difficulty reading or writing in English?   no  What is the primary cause of your kidney disease?kidney cancer  Are you currently on dialysis?   yes  How many years have you been on dialysis?  1 year  If yes, what days do you have your dialysis treatments?   Tues.Thurs,Sat  Have you received a transplant before?   no  If yes, what organ, and when and where was your transplant?   no  Have you been diagnosed with diabetes?    yes  Have you tested positive for hepatitis or HIV?   no  Have you ever been diagnosed with cancer?   yes, kidney cancer 2021  If yes, what type of cancer, and when and where were you treated?   Right kidney  Do you have a history of a heart attack or stroke?  Yes,stroke-2020   Are you currently or have you previously been seen by a mental health professional?   no  If yes, what is the name of your mental health provider?   no  Are you a current or former tobacco user?   yes  Do you have history of alcohol abuse or dependence?   no  Do you have a history of illegal drug abuse or dependence?   no  Has anyone told you they're willing to donate their kidney to you?   no  What is your blood type?  B+  Comments:   [facility]

## 2024-07-05 ENCOUNTER — HOME CARE VISIT (OUTPATIENT)
Dept: HOME HEALTH SERVICES | Facility: HOME HEALTH | Age: 69
End: 2024-07-05
Payer: MEDICARE

## 2024-07-05 PROCEDURE — G0151 HHCP-SERV OF PT,EA 15 MIN: HCPCS | Mod: HHH

## 2024-07-05 ASSESSMENT — ENCOUNTER SYMPTOMS
PERSON REPORTING PAIN: PATIENT
DENIES PAIN: 1

## 2024-07-05 ASSESSMENT — ACTIVITIES OF DAILY LIVING (ADL)
AMBULATION ASSISTANCE: 1
AMBULATION ASSISTANCE: INDEPENDENT

## 2024-07-05 NOTE — HOME HEALTH
Tuesday, Thursday, Saturdays dialysis.  No falls since my last visit.    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 Returned home Thursday, May 2nd, 2024, Fell May 3rd, 2024. Fall tore the port out of the right side of the chest. Tore my legs and arms, went back to the hospital to get leg wounds dressed but also to get the port fixed as well. She was in [place] for my legs. Then [place] to get port fixed then [place] for this. Home since at least Saturday. I spoke with patient who was at home on Sunday morning, 5 5 24. Multiple falls this year. More than 12.     I was in the hospital for a month, then [facility] for 3 months then came home on a Friday, following Thursday I fell and I went back to the hospital. 2 falls since my last visit.     TUG 33 seconds.     Patient challenged to walk as far as she is able. She responded by taking her 4 wheeled rollator walker and walked about 200 feet within her narrow residence, taking 2 minutes, 24 seconds minutes to do so.    2 minutes each   1. Ankle pumps in sitting   2. Full arc quads in sitting   3. Marching in sitting.

## 2024-07-08 ENCOUNTER — TELEPHONE (OUTPATIENT)
Dept: PRIMARY CARE | Facility: CLINIC | Age: 69
End: 2024-07-08
Payer: MEDICARE

## 2024-07-08 NOTE — TELEPHONE ENCOUNTER
FYI. [facility] follow up in home visit. Previous visit 2 weeks ago. One of the 2 weekly pill boxes was found to be completely full. Stated she thought she had been taking them. Re-educated on importance of taking meds as prescribed. Using Freestyle Shakir. Reading in 200s-300s. Continues to be noncompliant with diet. Had procedure for fistula formation to right arm. Wearing copression sleeve. Remains active with home care for wound care to RLE. Stated she has received a referral for a kidney transplant and will attend an educational session in October.   Will follow up with visit in 2 weeks.

## 2024-07-09 ENCOUNTER — TELEPHONE (OUTPATIENT)
Dept: PRIMARY CARE | Facility: CLINIC | Age: 69
End: 2024-07-09

## 2024-07-10 ENCOUNTER — OFFICE VISIT (OUTPATIENT)
Dept: PRIMARY CARE | Facility: CLINIC | Age: 69
End: 2024-07-10
Payer: MEDICARE

## 2024-07-10 VITALS
HEART RATE: 70 BPM | OXYGEN SATURATION: 96 % | RESPIRATION RATE: 18 BRPM | HEIGHT: 66 IN | TEMPERATURE: 97.5 F | BODY MASS INDEX: 26.22 KG/M2 | SYSTOLIC BLOOD PRESSURE: 146 MMHG | DIASTOLIC BLOOD PRESSURE: 84 MMHG

## 2024-07-10 DIAGNOSIS — Z79.4 TYPE 2 DIABETES MELLITUS WITH HYPERGLYCEMIA, WITH LONG-TERM CURRENT USE OF INSULIN (MULTI): ICD-10-CM

## 2024-07-10 DIAGNOSIS — E11.65 TYPE 2 DIABETES MELLITUS WITH HYPERGLYCEMIA, WITH LONG-TERM CURRENT USE OF INSULIN (MULTI): ICD-10-CM

## 2024-07-10 DIAGNOSIS — N18.6 ESRD ON DIALYSIS (MULTI): Primary | ICD-10-CM

## 2024-07-10 DIAGNOSIS — I50.32 CHRONIC DIASTOLIC HEART FAILURE (MULTI): ICD-10-CM

## 2024-07-10 DIAGNOSIS — J44.9 CHRONIC OBSTRUCTIVE PULMONARY DISEASE, UNSPECIFIED COPD TYPE (MULTI): ICD-10-CM

## 2024-07-10 DIAGNOSIS — R26.2 DIFFICULTY IN WALKING: ICD-10-CM

## 2024-07-10 DIAGNOSIS — F17.210 CIGARETTE NICOTINE DEPENDENCE WITHOUT COMPLICATION: ICD-10-CM

## 2024-07-10 DIAGNOSIS — S81.801D OPEN WOUND OF RIGHT LOWER LEG, SUBSEQUENT ENCOUNTER: ICD-10-CM

## 2024-07-10 DIAGNOSIS — Z99.2 ESRD ON DIALYSIS (MULTI): Primary | ICD-10-CM

## 2024-07-10 ASSESSMENT — PAIN SCALES - GENERAL: PAINLEVEL: 0-NO PAIN

## 2024-07-10 NOTE — PROGRESS NOTES
"Subjective   Patient ID: Daphne Morris is a 69 y.o. female who presents for Follow-up (Routine follow up, multiple medical issues).    Visit for 69 year old female seen today in private home for routine follow up of multiple medical issues. PMHx: DM, ESRF with dialysis. PAF, HTN, HLD, ASHD, CHF, Hypothyroid, GERD, CVA, Essential tremor, Cellulitis, right leg wound. Pt was initially at dining room table upon provider arrival smoking a cigarette. She was able to ambulate using her Rolator and is sitting on wooden chair in living room. She is not dressed today, only wearing a t-shirt. Pt is alert, able to answer simple questions regarding her health but she is an overall poor historian and most of the health history is supplemented by her medical chart. Pt has a vehicle but is not driving at this time due to declining health. Her friend or nephew Silver has been taking her to dialysis on T/T/S and will take her to any necessary medical appointments. Pt reports that her nephew is going to take her to her vascular follow up tomorrow to have right fistula checked. Pt is able to do her own dressing and toileting. She does admit that it is difficult to put pants on so she will go without most of the time while home. She has difficulty cooking. She makes frozen meals, using the microwave and eats a lot of junk food, snacks and \"pot pies\". Pt has difficulty remembering to take her medications. She is followed by house calls [facility] Autumn Abreu and Autumn fills her pill boxes every 2 weeks. Patient did not take an entire week worth of medications out of the past 2 weeks. She tells me that she is not sure how this happened. She is ambulatory with Rolator. Denies any recent falls. She continues to smoke but tells me that she has cut down, and is typically smoking 1 pack every 2 days now. Pt endorses shortness of breath with exertion but feels it has really improved. She uses oxygen, 3L at HS. She does not use oxygen during the " "day unless she takes a nap. She denies chest pain, palpitations, cough, wheezing. Denies appetite changes or signs of weight loss. She has a scale in the home but does not routinely monitor her weight. Denies bowel or bladder concerns. History of DM. She uses freestyle shakir. She had a glucose level of 500 this week. She states \"I had 2 raspberry donuts just before I checked it\". Pt reports that she has been compliant with her insulin. She has wound to right anterior leg and is followed by [facility] and Deanna Villa, wound NP in the home setting and Deanna is actually present in the home today to do the wound care. Wound is improving. No fever, chills, increased pain/discomfort to leg.     Home Visit:          Medically necessary due to: Illness or condition that results in activity lmitation or restriction that impacts the ability to leave home such as:, unsteady gait/poor balance, shortness of breath with exertion    `     Current Outpatient Medications:     acetaminophen (Tylenol) 500 mg tablet, Take 2 tablets (1,000 mg) by mouth 2 times a day., Disp: , Rfl:     albuterol 90 mcg/actuation inhaler, Inhale 2 puffs every 4 hours if needed., Disp: , Rfl:     amiodarone (Pacerone) 200 mg tablet, Take 1 tablet (200 mg) by mouth once daily with breakfast., Disp: 90 tablet, Rfl: 1    aspirin 81 mg EC tablet, Take 1 tablet (81 mg) by mouth once daily., Disp: , Rfl:     atorvastatin (Lipitor) 40 mg tablet, Take 1 tablet (40 mg) by mouth once daily., Disp: , Rfl:     BD Calista 2nd Gen Pen Needle 32 gauge x 5/32\" needle, USE SUBCUTANEOUSLY AS DIRECTED TWICE DAILY, Disp: , Rfl:     clopidogrel (Plavix) 75 mg tablet, Take 1 tablet (75 mg) by mouth once daily., Disp: , Rfl:     ferrous gluconate 324 (38 Fe) MG tablet, Take 1 tablet (324 mg) by mouth once daily with breakfast., Disp: 30 tablet, Rfl: 3    FreeStyle Shakir 14 Day Sensor kit, USE AS DIRECTED TO CHECK SUGARS 3 TO 4 TIMES DAILY, Disp: 1 each, Rfl: 11    FreeStyle " Shakir 2 [place] misc, Use as instructed, Disp: 1 each, Rfl: 0    FreeStyle Shakir 3 [place] misc, Use as instructed, Disp: 1 each, Rfl: 0    FreeStyle Shakir 3 Sensor device, Change sensor Q 14 days, Disp: 2 each, Rfl: 11    HumaLOG KwikPen Insulin 100 unit/mL injection, up to 15 units Subcutaneous three times a day per sliding scaleup to 15 units Subcutaneous three times a day per sliding scale (Patient taking differently: up to 15 units Subcutaneous three times a day per sliding scale.    151-200 2 units 201-250 4 units 251-300 6 units 301-350 8 units 351-400 10 units), Disp: 5 each, Rfl: 3    Lantus Solostar U-100 Insulin 100 unit/mL (3 mL) pen, Inject 10 Units under the skin once daily at bedtime., Disp: 9 mL, Rfl: 3    levothyroxine (Synthroid, Levoxyl) 200 mcg tablet, Take 1 tablet (200 mcg) by mouth once daily in the morning. Take before meals., Disp: 90 tablet, Rfl: 3    levothyroxine (Synthroid, Levoxyl) 50 mcg tablet, Take 1 tablet (50 mcg) by mouth once daily in the morning. Take before meals., Disp: 90 tablet, Rfl: 3    metoprolol succinate XL (Toprol-XL) 50 mg 24 hr tablet, Take 1 tablet (50 mg) by mouth once daily. Do not crush or chew., Disp: , Rfl:     oxygen DME/Hospice (O2) therapy, Inhale 3 L/min once daily at bedtime. Indications: decreased oxygen in the tissues or blood, Disp: , Rfl:     pantoprazole (ProtoNix) 40 mg EC tablet, Take 1 tablet (40 mg) by mouth 2 times a day., Disp: 60 tablet, Rfl: 6    venlafaxine XR (Effexor-XR) 75 mg 24 hr capsule, Take 2 capsules (150 mg) by mouth once daily., Disp: 180 capsule, Rfl: 3     Review of Systems  Constitutional:  Negative for appetite change, chills and fever.   HENT:  Negative for hearing loss and trouble swallowing.    Respiratory: Positive for shortness of breath with exertion, improved. Negative for cough and wheezing.    Cardiovascular: Negative for chest pain and leg swelling.   Gastrointestinal: Negative for abdominal pain, constipation,  "diarrhea, nausea and vomiting.   Endocrine: Positive for diabetes.   Genitourinary:  Negative for dysuria.   Musculoskeletal:  Positive for arthralgias, unsteady gait  Skin:  Positive for wound to right leg.   Neurological: Positive for weakness. Negative for dizziness, lightheadedness.   Psychiatric/Behavioral: Positive for anxiety, depression, difficulty sleeping    Objective   /84 (BP Location: Left arm, Patient Position: Sitting, BP Cuff Size: Adult)   Pulse 70   Temp 36.4 °C (97.5 °F) (Temporal)   Resp 18   Ht 1.676 m (5' 6\")   SpO2 96%   BMI 26.22 kg/m²   BP taken at 10:50 AM. Pt has not taken morning medications yet.     Physical Exam  Constitutional:       General: She is not in acute distress.     Appearance: Alert. Disheveled. She is chronically ill. She is not toxic-appearing.   HENT:      Head: Normocephalic and atraumatic.      Nose: Nose normal.      Mouth/Throat:      Mouth: Mucous membranes are moist.      Pharynx: Oropharynx is clear.   Eyes:      Conjunctiva/sclera: Conjunctivae normal.      Pupils: Pupils are equal, round, and reactive to light.   Cardiovascular:      Rate and Rhythm: Normal rate and regular rhythm.      Pulses: Normal pulses.      Heart sounds: Normal heart sounds.      Tunneled dialysis cath to right chest     No edema to lower extremities   Pulmonary:      Effort: Pulmonary effort is normal.      Breath sounds: Lungs diminished. No wheezing, rales or rhonchi.   Abdominal:      General: Bowel sounds are normal.      Palpations: Abdomen is soft.   Musculoskeletal:         General: Normal range of motion.   Skin:     General: Skin is warm and dry.      Coloration: Skin is pale. Wound to right lower leg with dressing intact, managed by Deanna Villa CNP today  Neurological:      General: No focal deficit present.      Mental Status: She is alert and oriented to person, place, and time.      Gait unsteady, uses Rolator   Psychiatric:         Mood and Affect: Mood " normal.         Behavior: Behavior normal.      Poor insight and judgement     Assessment/Plan   Diagnoses and all orders for this visit:  ESRD on dialysis (Multi)  -chronic, continues on dialysis T/T/S  -follow up with vascular tomorrow as scheduled     Type 2 diabetes mellitus with hyperglycemia, with long-term current use of insulin (Multi)  -chronic, poorly controlled  -continues to have poor diet habits   -encouraged compliance of lantus, humalog as prescribed  -continue to monitor glucose levels via freestyle vicente     Chronic diastolic heart failure (Multi)  -chronic, stable, euvolemic on exam  -continue oxygen at HS     Chronic obstructive pulmonary disease, unspecified COPD type (Multi)  Cigarette nicotine dependence without complication  -chronic, oxygen dependent at   -continue albuterol inhaler as needed     Open wound of right lower leg, subsequent encounter  -chronic, improving   -esme terrazas, wound NP present in home and changing dressing on exam  [facility]     Difficulty in walking  -chronic, continue to use rolator when ambulating to minimize fall risk     Will follow up with pt in 4 weeks as she is high risk for rehospitalization. Advised pt to contact house calls office with any acute concerns or medication needs.        Poly Diaz, APRN-CNP

## 2024-07-11 ENCOUNTER — APPOINTMENT (OUTPATIENT)
Dept: VASCULAR SURGERY | Facility: CLINIC | Age: 69
End: 2024-07-11
Payer: MEDICARE

## 2024-07-13 ENCOUNTER — HOME CARE VISIT (OUTPATIENT)
Dept: HOME HEALTH SERVICES | Facility: HOME HEALTH | Age: 69
End: 2024-07-13
Payer: MEDICARE

## 2024-07-13 VITALS
SYSTOLIC BLOOD PRESSURE: 154 MMHG | DIASTOLIC BLOOD PRESSURE: 72 MMHG | HEART RATE: 67 BPM | TEMPERATURE: 97.5 F | RESPIRATION RATE: 18 BRPM

## 2024-07-13 PROCEDURE — G0299 HHS/HOSPICE OF RN EA 15 MIN: HCPCS | Mod: HHH

## 2024-07-13 ASSESSMENT — ENCOUNTER SYMPTOMS
BLURRED VISION: 1
MUSCLE WEAKNESS: 1
SKIN LESIONS: 1
APPETITE LEVEL: FAIR
LAST BOWEL MOVEMENT: 67033
DENIES PAIN: 1
PERSON REPORTING PAIN: PATIENT

## 2024-07-13 ASSESSMENT — ACTIVITIES OF DAILY LIVING (ADL)
CURRENT_FUNCTION: STAND BY ASSIST
AMBULATION ASSISTANCE: STAND BY ASSIST

## 2024-07-19 ENCOUNTER — HOME CARE VISIT (OUTPATIENT)
Dept: HOME HEALTH SERVICES | Facility: HOME HEALTH | Age: 69
End: 2024-07-19
Payer: MEDICARE

## 2024-07-19 VITALS
SYSTOLIC BLOOD PRESSURE: 153 MMHG | TEMPERATURE: 97.2 F | RESPIRATION RATE: 16 BRPM | DIASTOLIC BLOOD PRESSURE: 84 MMHG | HEART RATE: 69 BPM

## 2024-07-19 PROCEDURE — G0299 HHS/HOSPICE OF RN EA 15 MIN: HCPCS | Mod: HHH

## 2024-07-19 ASSESSMENT — ENCOUNTER SYMPTOMS
APPETITE LEVEL: GOOD
DENIES PAIN: 1
BLURRED VISION: 1
PERSON REPORTING PAIN: PATIENT
MUSCLE WEAKNESS: 1
LAST BOWEL MOVEMENT: 67040
CHANGE IN APPETITE: UNCHANGED

## 2024-07-19 ASSESSMENT — ACTIVITIES OF DAILY LIVING (ADL)
AMBULATION ASSISTANCE: STAND BY ASSIST
CURRENT_FUNCTION: STAND BY ASSIST

## 2024-07-22 ENCOUNTER — HOME CARE VISIT (OUTPATIENT)
Dept: HOME HEALTH SERVICES | Facility: HOME HEALTH | Age: 69
End: 2024-07-22
Payer: MEDICARE

## 2024-07-22 VITALS
TEMPERATURE: 97.6 F | RESPIRATION RATE: 16 BRPM | HEART RATE: 57 BPM | SYSTOLIC BLOOD PRESSURE: 161 MMHG | DIASTOLIC BLOOD PRESSURE: 66 MMHG

## 2024-07-22 PROCEDURE — G0299 HHS/HOSPICE OF RN EA 15 MIN: HCPCS | Mod: HHH

## 2024-07-22 SDOH — HEALTH STABILITY: MENTAL HEALTH: SMOKING IN HOME: 1

## 2024-07-22 SDOH — ECONOMIC STABILITY: HOUSING INSECURITY: EVIDENCE OF SMOKING MATERIAL: 1

## 2024-07-22 ASSESSMENT — ENCOUNTER SYMPTOMS
CHANGE IN APPETITE: UNCHANGED
APPETITE LEVEL: GOOD
PERSON REPORTING PAIN: PATIENT
DENIES PAIN: 1
BLURRED VISION: 1
SKIN LESIONS: 1
MUSCLE WEAKNESS: 1

## 2024-07-22 ASSESSMENT — ACTIVITIES OF DAILY LIVING (ADL)
AMBULATION ASSISTANCE: STAND BY ASSIST
CURRENT_FUNCTION: STAND BY ASSIST

## 2024-07-26 ENCOUNTER — TELEPHONE (OUTPATIENT)
Dept: PRIMARY CARE | Facility: CLINIC | Age: 69
End: 2024-07-26
Payer: MEDICARE

## 2024-07-26 NOTE — TELEPHONE ENCOUNTER
Retinal detachment and needs surgery. Are you ok with signing off that she is cleared for surgery. Will need co signed by Dr. LESLIE Lee.

## 2024-07-31 ENCOUNTER — DOCUMENTATION (OUTPATIENT)
Dept: CARE COORDINATION | Facility: CLINIC | Age: 69
End: 2024-07-31
Payer: MEDICARE

## 2024-07-31 ENCOUNTER — ANESTHESIA EVENT (OUTPATIENT)
Dept: OPERATING ROOM | Age: 69
End: 2024-07-31
Payer: MEDICARE

## 2024-07-31 RX ORDER — FERROUS GLUCONATE 324(38)MG
324 TABLET ORAL
COMMUNITY

## 2024-07-31 RX ORDER — CLOPIDOGREL BISULFATE 75 MG/1
75 TABLET ORAL DAILY
COMMUNITY

## 2024-07-31 RX ORDER — PANTOPRAZOLE SODIUM 40 MG/1
40 TABLET, DELAYED RELEASE ORAL DAILY
COMMUNITY

## 2024-07-31 RX ORDER — LEVOTHYROXINE SODIUM 0.05 MG/1
50 TABLET ORAL DAILY
COMMUNITY

## 2024-07-31 RX ORDER — INSULIN GLARGINE 100 [IU]/ML
14 INJECTION, SOLUTION SUBCUTANEOUS NIGHTLY
COMMUNITY

## 2024-07-31 RX ORDER — ASPIRIN 81 MG/1
81 TABLET ORAL DAILY
COMMUNITY

## 2024-07-31 RX ORDER — ATORVASTATIN CALCIUM 40 MG/1
40 TABLET, FILM COATED ORAL DAILY
COMMUNITY

## 2024-07-31 RX ORDER — LEVOTHYROXINE SODIUM 0.2 MG/1
200 TABLET ORAL DAILY
COMMUNITY

## 2024-07-31 RX ORDER — METOPROLOL SUCCINATE 50 MG/1
50 TABLET, EXTENDED RELEASE ORAL DAILY
COMMUNITY

## 2024-07-31 RX ORDER — VENLAFAXINE 75 MG/1
75 TABLET ORAL 2 TIMES DAILY
COMMUNITY

## 2024-07-31 NOTE — PROGRESS NOTES
Patient is a dialysis patient, receives dialysis T,TH,Sat.  She will be skipping Dialysis on 8/1 and going on 8/2.  Updated Dr Owen, potassium ordered for DOS.

## 2024-07-31 NOTE — PROGRESS NOTES
Referral closed due to no response from previous outreach efforts; educator remains available as needed with new referral order.

## 2024-07-31 NOTE — PROGRESS NOTES
[facility] PRE-ADMISSION TESTING INSTRUCTIONS    The Preadmission Testing patient is instructed accordingly using the following criteria (check applicable):    ARRIVAL INSTRUCTIONS:  [x] Parking the day of Surgery is located in the Main Entrance lot.  Upon entering the door, make an immediate right to the surgery reception desk    [] Bring photo ID and insurance card    [x] Bring in a copy of Living will or Durable Power of  papers.    [x] Please be sure to arrange for a responsible adult to provide transportation to and from the hospital    [x] Please arrange for a responsible adult to be with you for the 24 hour period post procedure due to having anesthesia    [x] If you awake am of surgery not feeling well or have temperature >100 please call [phone]    GENERAL INSTRUCTIONS:    [x] No solid foods after midnight, may have up to 8oz of water until 4 hours prior to surgery. No gum, no candy, no mints. NPO time:       [x] You may brush your teeth, do not swallow any toothpaste    [x] Take medications as instructed     [x] Stop herbal supplements and vitamins 5 days prior to procedure    [x] Follow preop dosing of blood thinners per physician instructions    [x] Take 1/2 dose of evening insulin, but no insulin after midnight    [x] No oral diabetic medications after midnight    [x] If diabetic and have low blood sugar or feel symptomatic, take 1-2oz apple juice only    [] Bring inhalers day of surgery    [] Bring urine specimen day of surgery    [x] Shower or bath with soap, lather and rinse well, AM of Surgery, no lotion, powders or creams to surgical site    [] Follow bowel prep as instructed per surgeon    [x] No tobacco products within 24 hours of surgery     [x] No alcohol or illegal drug use, marijuana included, within 24 hours of surgery.    [x] Jewelry, body piercing's, eyeglasses, contact lenses and dentures are not permitted into surgery (bring cases)      [x] Please

## 2024-07-31 NOTE — ANESTHESIA PRE PROCEDURE
Department of Anesthesiology  Preprocedure Note       Name:  Patrica Marsh   Age:  69 y.o.  :  1955                                          MRN:  [id number]         Date:  2024      Surgeon: Surgeon(s):  Daniel Conrad MD    Procedure: Procedure(s):  VITRECTOMY RETINAL DETACHMENT REPAIR LASER GAS FLUID EXCHANGE RIGHT EYE    Medications prior to admission:   Prior to Admission medications    Medication Sig Start Date End Date Taking? Authorizing Provider   aspirin 81 MG EC tablet Take 1 tablet by mouth daily   Yes Jac Mckeon MD   atorvastatin (LIPITOR) 40 MG tablet Take 1 tablet by mouth daily   Yes Jac Mckeon MD   clopidogrel (PLAVIX) 75 MG tablet Take 1 tablet by mouth daily   Yes Jac Mckeon MD   ferrous gluconate (FERGON) 324 (38 Fe) MG tablet Take 1 tablet by mouth daily (with breakfast)   Yes Jac Mckeon MD   Insulin Lispro (HUMALOG KWIKPEN SC) Inject into the skin Sliding scale   Yes Jac Mckeon MD   insulin glargine (LANTUS) 100 UNIT/ML injection vial Inject 14 Units into the skin nightly   Yes Jac Mckeon MD   levothyroxine (SYNTHROID) 200 MCG tablet Take 1 tablet by mouth Daily   Yes Jac Mckeon MD   levothyroxine (SYNTHROID) 50 MCG tablet Take 1 tablet by mouth Daily   Yes Jac Mckeon MD   metoprolol succinate (TOPROL XL) 50 MG extended release tablet Take 1 tablet by mouth daily   Yes Jac Mckeon MD   pantoprazole (PROTONIX) 40 MG tablet Take 1 tablet by mouth daily   Yes Jac Mckeon MD   venlafaxine (EFFEXOR) 75 MG tablet Take 1 tablet by mouth 2 times daily   Yes Jac Mckeon MD   OXYGEN Inhale 2 L into the lungs at bedtime   Yes Jac Mckeon MD       Current medications:    No current facility-administered medications for this encounter.     Current Outpatient Medications   Medication Sig Dispense Refill   • aspirin 81 MG EC tablet Take 1 tablet by mouth daily     •

## 2024-07-31 NOTE — PROGRESS NOTES
PAT assessment completed with patient to the best of her ability.  Patient is a VERY poor historian, does not know her allergies and was not able to verify what medications she is taking due to being unable to read right now.  Medications were added to MAR from H&P from 7/11/24, however patient states they are always changing her medications.  Advised patient to bring all of her medications with her tomorrow to surgery and not take anything.  Patient was advised to take half of her evening dose of insulin tonight and no diabetic medication after midnight. Patient states her friend took her blood thinners out of her medications \"a few days ago\". Patient states understanding on medication instructions.

## 2024-08-01 ENCOUNTER — ANESTHESIA (OUTPATIENT)
Dept: OPERATING ROOM | Age: 69
End: 2024-08-01
Payer: MEDICARE

## 2024-08-01 ENCOUNTER — HOSPITAL ENCOUNTER (OUTPATIENT)
Age: 69
Setting detail: OUTPATIENT SURGERY
Discharge: HOME | End: 2024-08-01
Attending: OPHTHALMOLOGY | Admitting: OPHTHALMOLOGY
Payer: MEDICARE

## 2024-08-01 ENCOUNTER — DOCUMENTATION (OUTPATIENT)
Dept: PRIMARY CARE | Facility: CLINIC | Age: 69
End: 2024-08-01
Payer: MEDICARE

## 2024-08-01 VITALS
RESPIRATION RATE: 16 BRPM | WEIGHT: 152 LBS | HEIGHT: 66 IN | OXYGEN SATURATION: 96 % | TEMPERATURE: 97.3 F | SYSTOLIC BLOOD PRESSURE: 201 MMHG | HEART RATE: 57 BPM | DIASTOLIC BLOOD PRESSURE: 80 MMHG | BODY MASS INDEX: 24.43 KG/M2

## 2024-08-01 LAB
GLUCOSE BLD-MCNC: 236 MG/DL (ref 74–99)
POTASSIUM SERPL-SCNC: 5.1 MMOL/L (ref 3.5–5)

## 2024-08-01 PROCEDURE — 6370000000 HC RX 637 (ALT 250 FOR IP): Performed by: OPHTHALMOLOGY

## 2024-08-01 PROCEDURE — 6360000002 HC RX W HCPCS: Performed by: ANESTHESIOLOGY

## 2024-08-01 PROCEDURE — 3700000000 HC ANESTHESIA ATTENDED CARE: Performed by: OPHTHALMOLOGY

## 2024-08-01 PROCEDURE — 2580000003 HC RX 258: Performed by: ANESTHESIOLOGY

## 2024-08-01 PROCEDURE — 82962 GLUCOSE BLOOD TEST: CPT

## 2024-08-01 PROCEDURE — 6360000002 HC RX W HCPCS: Performed by: OPHTHALMOLOGY

## 2024-08-01 PROCEDURE — 2709999900 HC NON-CHARGEABLE SUPPLY: Performed by: OPHTHALMOLOGY

## 2024-08-01 PROCEDURE — 2500000003 HC RX 250 WO HCPCS: Performed by: OPHTHALMOLOGY

## 2024-08-01 PROCEDURE — 3600000003 HC SURGERY LEVEL 3 BASE: Performed by: OPHTHALMOLOGY

## 2024-08-01 PROCEDURE — 3600000013 HC SURGERY LEVEL 3 ADDTL 15MIN: Performed by: OPHTHALMOLOGY

## 2024-08-01 PROCEDURE — 3700000001 HC ADD 15 MINUTES (ANESTHESIA): Performed by: OPHTHALMOLOGY

## 2024-08-01 PROCEDURE — 7100000010 HC PHASE II RECOVERY - FIRST 15 MIN: Performed by: OPHTHALMOLOGY

## 2024-08-01 PROCEDURE — 7100000011 HC PHASE II RECOVERY - ADDTL 15 MIN: Performed by: OPHTHALMOLOGY

## 2024-08-01 PROCEDURE — 84132 ASSAY OF SERUM POTASSIUM: CPT

## 2024-08-01 RX ORDER — TROPICAMIDE 10 MG/ML
1 SOLUTION/ DROPS OPHTHALMIC PRN
Status: COMPLETED | OUTPATIENT
Start: 2024-08-01 | End: 2024-08-01

## 2024-08-01 RX ORDER — SODIUM CHLORIDE 9 MG/ML
INJECTION, SOLUTION INTRAVENOUS PRN
Status: DISCONTINUED | OUTPATIENT
Start: 2024-08-01 | End: 2024-08-01 | Stop reason: HOSPADM

## 2024-08-01 RX ORDER — PROPARACAINE HYDROCHLORIDE 5 MG/ML
1 SOLUTION/ DROPS OPHTHALMIC PRN
Status: DISCONTINUED | OUTPATIENT
Start: 2024-08-01 | End: 2024-08-01 | Stop reason: HOSPADM

## 2024-08-01 RX ORDER — FENTANYL CITRATE 50 UG/ML
25 INJECTION, SOLUTION INTRAMUSCULAR; INTRAVENOUS EVERY 5 MIN PRN
Status: CANCELLED | OUTPATIENT
Start: 2024-08-01

## 2024-08-01 RX ORDER — SODIUM CHLORIDE 9 MG/ML
INJECTION, SOLUTION INTRAVENOUS PRN
Status: CANCELLED | OUTPATIENT
Start: 2024-08-01

## 2024-08-01 RX ORDER — PHENYLEPHRINE HYDROCHLORIDE 25 MG/ML
1 SOLUTION/ DROPS OPHTHALMIC PRN
Status: COMPLETED | OUTPATIENT
Start: 2024-08-01 | End: 2024-08-01

## 2024-08-01 RX ORDER — NALOXONE HYDROCHLORIDE 0.4 MG/ML
INJECTION, SOLUTION INTRAMUSCULAR; INTRAVENOUS; SUBCUTANEOUS PRN
Status: CANCELLED | OUTPATIENT
Start: 2024-08-01

## 2024-08-01 RX ORDER — DEXAMETHASONE SODIUM PHOSPHATE 10 MG/ML
INJECTION INTRAMUSCULAR; INTRAVENOUS PRN
Status: DISCONTINUED | OUTPATIENT
Start: 2024-08-01 | End: 2024-08-01 | Stop reason: ALTCHOICE

## 2024-08-01 RX ORDER — SODIUM CHLORIDE 0.9 % (FLUSH) 0.9 %
5-40 SYRINGE (ML) INJECTION PRN
Status: DISCONTINUED | OUTPATIENT
Start: 2024-08-01 | End: 2024-08-01 | Stop reason: HOSPADM

## 2024-08-01 RX ORDER — CYCLOPENTOLATE HYDROCHLORIDE 10 MG/ML
1 SOLUTION/ DROPS OPHTHALMIC PRN
Status: DISCONTINUED | OUTPATIENT
Start: 2024-08-01 | End: 2024-08-01 | Stop reason: HOSPADM

## 2024-08-01 RX ORDER — SODIUM CHLORIDE 0.9 % (FLUSH) 0.9 %
5-40 SYRINGE (ML) INJECTION PRN
Status: CANCELLED | OUTPATIENT
Start: 2024-08-01

## 2024-08-01 RX ORDER — SODIUM CHLORIDE 0.9 % (FLUSH) 0.9 %
5-40 SYRINGE (ML) INJECTION EVERY 12 HOURS SCHEDULED
Status: DISCONTINUED | OUTPATIENT
Start: 2024-08-01 | End: 2024-08-01 | Stop reason: HOSPADM

## 2024-08-01 RX ORDER — SODIUM CHLORIDE 9 MG/ML
INJECTION, SOLUTION INTRAVENOUS CONTINUOUS PRN
Status: DISCONTINUED | OUTPATIENT
Start: 2024-08-01 | End: 2024-08-01 | Stop reason: SDUPTHER

## 2024-08-01 RX ORDER — TRIAMCINOLONE ACETONIDE 40 MG/ML
40 INJECTION, SUSPENSION INTRA-ARTICULAR; INTRAMUSCULAR ONCE
Status: DISCONTINUED | OUTPATIENT
Start: 2024-08-01 | End: 2024-08-01 | Stop reason: HOSPADM

## 2024-08-01 RX ORDER — ONDANSETRON 2 MG/ML
4 INJECTION INTRAMUSCULAR; INTRAVENOUS
Status: CANCELLED | OUTPATIENT
Start: 2024-08-01 | End: 2024-08-02

## 2024-08-01 RX ORDER — MIDAZOLAM HYDROCHLORIDE 1 MG/ML
INJECTION INTRAMUSCULAR; INTRAVENOUS PRN
Status: DISCONTINUED | OUTPATIENT
Start: 2024-08-01 | End: 2024-08-01 | Stop reason: SDUPTHER

## 2024-08-01 RX ORDER — PHENYLEPHRINE HYDROCHLORIDE 100 MG/ML
1 SOLUTION/ DROPS OPHTHALMIC PRN
Status: DISCONTINUED | OUTPATIENT
Start: 2024-08-01 | End: 2024-08-01 | Stop reason: HOSPADM

## 2024-08-01 RX ORDER — SODIUM CHLORIDE 0.9 % (FLUSH) 0.9 %
5-40 SYRINGE (ML) INJECTION EVERY 12 HOURS SCHEDULED
Status: CANCELLED | OUTPATIENT
Start: 2024-08-01

## 2024-08-01 RX ADMIN — Medication 1 DROP: at 13:39

## 2024-08-01 RX ADMIN — PHENYLEPHRINE HYDROCHLORIDE 1 DROP: 25 SOLUTION/ DROPS OPHTHALMIC at 13:16

## 2024-08-01 RX ADMIN — SODIUM CHLORIDE: 9 INJECTION, SOLUTION INTRAVENOUS at 14:28

## 2024-08-01 RX ADMIN — Medication 1 DROP: at 13:04

## 2024-08-01 RX ADMIN — PHENYLEPHRINE HYDROCHLORIDE 1 DROP: 25 SOLUTION/ DROPS OPHTHALMIC at 13:39

## 2024-08-01 RX ADMIN — PHENYLEPHRINE HYDROCHLORIDE 1 DROP: 25 SOLUTION/ DROPS OPHTHALMIC at 13:04

## 2024-08-01 RX ADMIN — MIDAZOLAM 2 MG: 1 INJECTION INTRAMUSCULAR; INTRAVENOUS at 14:29

## 2024-08-01 RX ADMIN — Medication 1 DROP: at 13:16

## 2024-08-01 ASSESSMENT — PAIN - FUNCTIONAL ASSESSMENT: PAIN_FUNCTIONAL_ASSESSMENT: NONE - DENIES PAIN

## 2024-08-01 NOTE — PROGRESS NOTES
[facility] CM follow up in home visit 7/31/24. Noncompliant with taking meds over past 2 weeks. Stated related to vision loss and instructions to hold Plavix prior to eye surgery scheduled for 8/1 for detached retina. although friend help rearrange medications in pill box. Had visit from wound nurse and RLE wound healing well. Has not been checking BG. Received new Freestyle sensor but did not want to apply due to upcoming surgery. Reeducated on importance of taking meds as prescribed and checking BG. Received letter from [facility] stating they have been unable to reach her to schedule PASSPORT assessment. She attempted to call during visit and was on hold for extended period of time. Will attempt to schedule assessment for patient and follow up in 2 weeks with in home visit.

## 2024-08-01 NOTE — ANESTHESIA POSTPROCEDURE EVALUATION
Department of Anesthesiology  Postprocedure Note    Patient: Patrica Marsh  MRN: [id number]  YOB: 1955  Date of evaluation: 8/1/2024    Procedure Summary       Date: 08/01/24 Room / Location: [address]    Anesthesia Start: 1422 Anesthesia Stop: 1505    Procedure: VITRECTOMY RETINAL DETACHMENT REPAIR LASER GAS FLUID EXCHANGE RIGHT EYE (C3F8) (Right: Eye) Diagnosis:       Old retinal detachment, partial      (Old retinal detachment, partial [H33.8])    Surgeons: Daniel Conrad MD Responsible Provider: Chucho Danielle MD    Anesthesia Type: MAC ASA Status: 3            Anesthesia Type: MAC    Korin Phase I: Korin Score: 10    Korin Phase II:      Anesthesia Post Evaluation    Patient location during evaluation: PACU  Patient participation: complete - patient participated  Level of consciousness: awake and alert  Pain score: 2  Airway patency: patent  Nausea & Vomiting: no nausea and no vomiting  Cardiovascular status: blood pressure returned to baseline  Respiratory status: acceptable  Hydration status: euvolemic  Pain management: adequate    No notable events documented.

## 2024-08-02 ENCOUNTER — HOME CARE VISIT (OUTPATIENT)
Dept: HOME HEALTH SERVICES | Facility: HOME HEALTH | Age: 69
End: 2024-08-02
Payer: MEDICARE

## 2024-08-02 VITALS
HEART RATE: 70 BPM | RESPIRATION RATE: 20 BRPM | DIASTOLIC BLOOD PRESSURE: 88 MMHG | SYSTOLIC BLOOD PRESSURE: 154 MMHG | TEMPERATURE: 98 F | OXYGEN SATURATION: 100 %

## 2024-08-02 PROCEDURE — G0299 HHS/HOSPICE OF RN EA 15 MIN: HCPCS | Mod: HHH

## 2024-08-02 SDOH — HEALTH STABILITY: MENTAL HEALTH: SMOKING IN HOME: 1

## 2024-08-02 SDOH — ECONOMIC STABILITY: HOUSING INSECURITY: EVIDENCE OF SMOKING MATERIAL: 1

## 2024-08-02 ASSESSMENT — ENCOUNTER SYMPTOMS
APPETITE LEVEL: GOOD
MUSCLE WEAKNESS: 1
LAST BOWEL MOVEMENT: 67054
BLURRED VISION: 1
CHANGE IN APPETITE: UNCHANGED

## 2024-08-02 NOTE — OP NOTE
[place] [address]                            OPERATIVE REPORT      PATIENT NAME: ELISABETH MAI              : 1955  MED REC NO: [id number]                        ROOM: [facility]  ACCOUNT NO: [id number]                       ADMIT DATE: 2024  PROVIDER: Daniel Conrad MD      DATE OF PROCEDURE:  2024    SURGEON:  Daniel Conrad MD    ASSISTANT:  Brandy Herzog.    PREOPERATIVE DIAGNOSIS:  Right eye macula-off rhegmatogenous retinal detachment.    POSTOPERATIVE DIAGNOSIS:  Right eye macula-off rhegmatogenous retinal detachment.    OPERATIONS:  A 25-gauge vitrectomy with retinal detachment repair, endolaser, air-fluid exchange, and 14% C3F8 air exchange.    ANESTHESIA:  Local, retrobulbar.    CLINICAL NOTE:  The patient is a 69-year-old female who presented with decreased vision in the right eye, was found to have a macula-off rhegmatogenous retinal detachment.  Risks and benefits of vitrectomy surgery were discussed with the patient in layman's terms.  After understanding all the risks and benefits, she agreed to undergo the procedure.    DESCRIPTION OF PROCEDURE:  After proper informed consent was obtained, the patient was brought to the operating room and placed in the supine position where some sedation was given.  Following this, a retrobulbar block was administered to the right retrobulbar space. Approximately 7 mL of 50:50 mixture of 0.5% Marcaine with 2% lidocaine was administered.  After adequate anesthesia and akinesia, the patient was then prepped and draped in the usual fashion for ophthalmic surgery of her right eye.  A Chucho wire lid speculum was inserted into the conjunctival fornices.  Trocars were introduced inferotemporally, superotemporally, and superonasally, each 3.5 mm posterior to the limbus with gentle conjunctival displacement.  An infusion cannula was opened under direct

## 2024-08-02 NOTE — ADDENDUM NOTE
Addendum  created 08/02/24 0615 by Chucho Danielle MD    Flowsheet accepted, Intraprocedure Flowsheets edited

## 2024-08-07 ENCOUNTER — HOME CARE VISIT (OUTPATIENT)
Dept: HOME HEALTH SERVICES | Facility: HOME HEALTH | Age: 69
End: 2024-08-07
Payer: MEDICARE

## 2024-08-07 VITALS
DIASTOLIC BLOOD PRESSURE: 76 MMHG | RESPIRATION RATE: 18 BRPM | HEART RATE: 66 BPM | TEMPERATURE: 97.7 F | SYSTOLIC BLOOD PRESSURE: 164 MMHG

## 2024-08-07 DIAGNOSIS — J44.9 CHRONIC OBSTRUCTIVE PULMONARY DISEASE, UNSPECIFIED COPD TYPE (MULTI): Primary | ICD-10-CM

## 2024-08-07 DIAGNOSIS — I48.0 PAROXYSMAL ATRIAL FIBRILLATION (MULTI): ICD-10-CM

## 2024-08-07 PROCEDURE — G0299 HHS/HOSPICE OF RN EA 15 MIN: HCPCS | Mod: HHH

## 2024-08-07 RX ORDER — IPRATROPIUM BROMIDE AND ALBUTEROL SULFATE 2.5; .5 MG/3ML; MG/3ML
SOLUTION RESPIRATORY (INHALATION)
Qty: 360 ML | Refills: 0 | Status: SHIPPED | OUTPATIENT
Start: 2024-08-07

## 2024-08-07 RX ORDER — ALBUTEROL SULFATE 90 UG/1
2 INHALANT RESPIRATORY (INHALATION) EVERY 4 HOURS PRN
Qty: 8.5 G | Refills: 0 | Status: SHIPPED | OUTPATIENT
Start: 2024-08-07

## 2024-08-07 RX ORDER — METOPROLOL SUCCINATE 50 MG/1
50 TABLET, EXTENDED RELEASE ORAL 2 TIMES DAILY
Qty: 180 TABLET | Refills: 0 | Status: SHIPPED | OUTPATIENT
Start: 2024-08-07

## 2024-08-07 SDOH — ECONOMIC STABILITY: HOUSING INSECURITY: EVIDENCE OF SMOKING MATERIAL: 1

## 2024-08-07 SDOH — HEALTH STABILITY: MENTAL HEALTH: SMOKING IN HOME: 1

## 2024-08-07 ASSESSMENT — ENCOUNTER SYMPTOMS
BLURRED VISION: 1
PERSON REPORTING PAIN: PATIENT
MUSCLE WEAKNESS: 1
LAST BOWEL MOVEMENT: 67058
DENIES PAIN: 1
SKIN LESIONS: 1

## 2024-08-07 ASSESSMENT — ACTIVITIES OF DAILY LIVING (ADL)
CURRENT_FUNCTION: STAND BY ASSIST
AMBULATION ASSISTANCE: STAND BY ASSIST

## 2024-08-08 ENCOUNTER — TELEPHONE (OUTPATIENT)
Dept: PRIMARY CARE | Facility: CLINIC | Age: 69
End: 2024-08-08
Payer: MEDICARE

## 2024-08-08 NOTE — TELEPHONE ENCOUNTER
Patient returning call to office, unavailable for appointment on 8/9/24 ; 1:00 pm with Poly Diaz NP.. Appointment rescheduled 8/23/24 1:00 pm with CARMEN Diaz NP

## 2024-08-09 ENCOUNTER — APPOINTMENT (OUTPATIENT)
Dept: PRIMARY CARE | Facility: CLINIC | Age: 69
End: 2024-08-09
Payer: MEDICARE

## 2024-08-12 ENCOUNTER — HOME CARE VISIT (OUTPATIENT)
Dept: HOME HEALTH SERVICES | Facility: HOME HEALTH | Age: 69
End: 2024-08-12
Payer: MEDICARE

## 2024-08-12 PROCEDURE — G0299 HHS/HOSPICE OF RN EA 15 MIN: HCPCS | Mod: HHH

## 2024-08-12 SDOH — ECONOMIC STABILITY: HOUSING INSECURITY: EVIDENCE OF SMOKING MATERIAL: 1

## 2024-08-12 SDOH — HEALTH STABILITY: MENTAL HEALTH: SMOKING IN HOME: 1

## 2024-08-12 ASSESSMENT — ENCOUNTER SYMPTOMS
PERSON REPORTING PAIN: PATIENT
LOWER EXTREMITY EDEMA: 1
SKIN LESIONS: 1
LAST BOWEL MOVEMENT: 67063
BLURRED VISION: 1
DENIES PAIN: 1
APPETITE LEVEL: GOOD
CHANGE IN APPETITE: UNCHANGED

## 2024-08-16 ENCOUNTER — DOCUMENTATION (OUTPATIENT)
Dept: PRIMARY CARE | Facility: CLINIC | Age: 69
End: 2024-08-16
Payer: MEDICARE

## 2024-08-16 NOTE — PROGRESS NOTES
[facility] Calls CM follow up in home visit. Sitting up at time. Had procedure for retinal detachment at beginning of month. States she is doing well postop. Is attending dialysis 3x/week. Required catheter resinsertion this week. Pill boxes observed. Took one week of medications over past 2 weeks. Pill boxes refilled. Received Freestyle Shakir sensors and is using system again. BG upper 200's during visit. States she is taking insulin. Right leg wound healed. Expects one more visit from wound NP.   Reminded [facility] provider visit is scheduled for 8/23.

## 2024-08-21 ENCOUNTER — DOCUMENTATION (OUTPATIENT)
Dept: PRIMARY CARE | Facility: CLINIC | Age: 69
End: 2024-08-21
Payer: MEDICARE

## 2024-08-21 NOTE — PROGRESS NOTES
[facility] Call[facility] CM follow up in home visit. Is not taking medications consistently. All meds available. Pill boxes updated for 2 weeks. Received Freestyle Shakir sensors and applied. BG readings in upper 200s-300. Could not confirm that she is using sliding scale for Humalog. Sliding scale instruction re-written for patient.   Remains active with [facility]. In agreement with Passport referral as she missed calls to set up appointment for assessment. Reminded of [facility] Calls provider visit 8/23/24. Will follow up in 2 weeks.

## 2024-08-22 ENCOUNTER — TELEPHONE (OUTPATIENT)
Dept: PRIMARY CARE | Facility: CLINIC | Age: 69
End: 2024-08-22
Payer: MEDICARE

## 2024-08-22 NOTE — TELEPHONE ENCOUNTER
Call placed to patient number as listed, [phone], patient confirms address and insurance information.  COVID Screening negative, verbal consent obtained.  Appointment confirmed

## 2024-08-23 ENCOUNTER — OFFICE VISIT (OUTPATIENT)
Dept: PRIMARY CARE | Facility: CLINIC | Age: 69
End: 2024-08-23
Payer: MEDICARE

## 2024-08-23 ENCOUNTER — HOME CARE VISIT (OUTPATIENT)
Dept: HOME HEALTH SERVICES | Facility: HOME HEALTH | Age: 69
End: 2024-08-23
Payer: MEDICARE

## 2024-08-23 VITALS
TEMPERATURE: 97.1 F | WEIGHT: 154 LBS | DIASTOLIC BLOOD PRESSURE: 64 MMHG | HEART RATE: 75 BPM | RESPIRATION RATE: 18 BRPM | OXYGEN SATURATION: 98 % | SYSTOLIC BLOOD PRESSURE: 124 MMHG | BODY MASS INDEX: 24.75 KG/M2 | HEIGHT: 66 IN

## 2024-08-23 VITALS
HEART RATE: 72 BPM | TEMPERATURE: 97.4 F | OXYGEN SATURATION: 96 % | DIASTOLIC BLOOD PRESSURE: 68 MMHG | SYSTOLIC BLOOD PRESSURE: 130 MMHG | RESPIRATION RATE: 20 BRPM

## 2024-08-23 DIAGNOSIS — R26.2 TROUBLE WALKING: ICD-10-CM

## 2024-08-23 DIAGNOSIS — H33.21 RIGHT RETINAL DETACHMENT: ICD-10-CM

## 2024-08-23 DIAGNOSIS — Z79.4 TYPE 2 DIABETES MELLITUS WITH HYPERGLYCEMIA, WITH LONG-TERM CURRENT USE OF INSULIN (MULTI): Primary | ICD-10-CM

## 2024-08-23 DIAGNOSIS — F41.8 ANXIETY WITH DEPRESSION: ICD-10-CM

## 2024-08-23 DIAGNOSIS — N18.6 END STAGE RENAL DISEASE (MULTI): ICD-10-CM

## 2024-08-23 DIAGNOSIS — S81.801D OPEN WOUND OF RIGHT LOWER LEG, SUBSEQUENT ENCOUNTER: ICD-10-CM

## 2024-08-23 DIAGNOSIS — E11.65 TYPE 2 DIABETES MELLITUS WITH HYPERGLYCEMIA, WITH LONG-TERM CURRENT USE OF INSULIN (MULTI): Primary | ICD-10-CM

## 2024-08-23 PROCEDURE — 1126F AMNT PAIN NOTED NONE PRSNT: CPT | Performed by: NURSE PRACTITIONER

## 2024-08-23 PROCEDURE — 3078F DIAST BP <80 MM HG: CPT | Performed by: NURSE PRACTITIONER

## 2024-08-23 PROCEDURE — 1159F MED LIST DOCD IN RCRD: CPT | Performed by: NURSE PRACTITIONER

## 2024-08-23 PROCEDURE — 1123F ACP DISCUSS/DSCN MKR DOCD: CPT | Performed by: NURSE PRACTITIONER

## 2024-08-23 PROCEDURE — 3062F POS MACROALBUMINURIA REV: CPT | Performed by: NURSE PRACTITIONER

## 2024-08-23 PROCEDURE — 99349 HOME/RES VST EST MOD MDM 40: CPT | Performed by: NURSE PRACTITIONER

## 2024-08-23 PROCEDURE — 1160F RVW MEDS BY RX/DR IN RCRD: CPT | Performed by: NURSE PRACTITIONER

## 2024-08-23 PROCEDURE — 3008F BODY MASS INDEX DOCD: CPT | Performed by: NURSE PRACTITIONER

## 2024-08-23 PROCEDURE — G0299 HHS/HOSPICE OF RN EA 15 MIN: HCPCS | Mod: HHH

## 2024-08-23 PROCEDURE — 1157F ADVNC CARE PLAN IN RCRD: CPT | Performed by: NURSE PRACTITIONER

## 2024-08-23 PROCEDURE — 3074F SYST BP LT 130 MM HG: CPT | Performed by: NURSE PRACTITIONER

## 2024-08-23 RX ORDER — PREDNISOLONE ACETATE 10 MG/ML
1 SUSPENSION/ DROPS OPHTHALMIC 4 TIMES DAILY
COMMUNITY
Start: 2024-08-04

## 2024-08-23 ASSESSMENT — PAIN SCALES - GENERAL: PAINLEVEL: 0-NO PAIN

## 2024-08-23 ASSESSMENT — ACTIVITIES OF DAILY LIVING (ADL)
OASIS_M1830: 04
HOME_HEALTH_OASIS: 00

## 2024-08-23 ASSESSMENT — ENCOUNTER SYMPTOMS
DENIES PAIN: 1
PERSON REPORTING PAIN: PATIENT

## 2024-08-23 NOTE — PROGRESS NOTES
"Subjective   Patient ID: Daphne Morris is a 69 y.o. female who presents for Follow-up (Routine 1 month follow up, multiple medical issues).    Visit for 70 y/o female seen today in private home, alone for routine follow up of multiple medical conditions. Pt was initially sitting at kitchen table upon provider arrival. She was smoking a cigarette. Pt was able to ambulate into her living room using her Rolator and is sitting on the seat of her Rolator for exam. Pt is alert, able to answer most questions regarding her health. Pt lives alone in a mobile home. She has a vehicle but has not been driving due to declining health. Her friend Doug provides transportation to medical appointments and assists with grocery shopping. Pt has ESRD, dialysis dependent on T/T/S. She will take [place] to dialysis. Has right chest cath and vascular has been placing right arm fistula. Pt requires assistance with medication management. She is followed by house calls BRIE Leyva who continues to fill her pill box every 2 weeks. Pt admits that she forgets to take her medications and insulin frequently. She has DM. Continues to have very poor diet habits. Pt eats quick meals, mostly using the microwave and snacks most of the time. She will order take out, pizza or go out for chinese. Pt uses a freestyle vicente to monitor her glucose levels. She reports her BG to be \"high\" this morning and tells me that she ate sausage biscuits and gravy and multiple slices of bologna for breakfast. Pt also reports that she does not sleep well. She woke up around 2:30 this morning, ate some potato salad and went back to bed. Pt had a RLE wound. She was seen by [facility] skilled nurse today and discharged as the wound has healed. She has also been seeing Deanna Villa, wound NP in the home and has 1 more follow up before she is discharged. Pt had retinal detachment surgery on 8/1 of right eye. She is scheduled for follow up with ophthalmology next week. Pt " "denies pain or discomfort. She remains ambulatory with Rolator. Denies recent fall or injury. Pt has history of anxiety, depression. She is followed by [facility] NP Liz Neff in the home. Pt reports that her mood is depressed, she is agravated at times and often feels lonely. Pt denies SI and states \"that is not what the bible says\". Pt recently started seeing the counselor Albertina Abreu in the home and finds it to be helpful.    Home Visit:          Medically necessary due to: Illness or condition that results in activity lmitation or restriction that impacts the ability to leave home such as:, unsteady gait/poor balance, shortness of breath with exertion         Current Outpatient Medications:     prednisoLONE acetate (Pred-Forte) 1 % ophthalmic suspension, Administer 1 drop into the right eye 4 times a day., Disp: , Rfl:     acetaminophen (Tylenol) 500 mg tablet, Take 2 tablets (1,000 mg) by mouth 2 times a day., Disp: , Rfl:     albuterol 90 mcg/actuation inhaler, INHALE 2 PUFFS BY MOUTH EVERY 4 HOURS AS NEEDED, Disp: 8.5 g, Rfl: 0    amiodarone (Pacerone) 200 mg tablet, Take 1 tablet (200 mg) by mouth once daily with breakfast., Disp: 90 tablet, Rfl: 1    aspirin 81 mg EC tablet, Take 1 tablet (81 mg) by mouth once daily., Disp: , Rfl:     atorvastatin (Lipitor) 40 mg tablet, Take 1 tablet (40 mg) by mouth once daily., Disp: , Rfl:     BD Calista 2nd Gen Pen Needle 32 gauge x 5/32\" needle, USE SUBCUTANEOUSLY AS DIRECTED TWICE DAILY, Disp: , Rfl:     clopidogrel (Plavix) 75 mg tablet, Take 1 tablet (75 mg) by mouth once daily., Disp: , Rfl:     ferrous gluconate 324 (38 Fe) MG tablet, Take 1 tablet (324 mg) by mouth once daily with breakfast., Disp: 30 tablet, Rfl: 3    FreeStyle Shakir 14 Day Sensor kit, USE AS DIRECTED TO CHECK SUGARS 3 TO 4 TIMES DAILY, Disp: 1 each, Rfl: 11    FreeStyle Shakir 2 [place] misc, Use as instructed, Disp: 1 each, Rfl: 0    FreeStyle Shakir 3 [place] misc, Use as instructed, " Disp: 1 each, Rfl: 0    FreeStyle Shakir 3 Sensor device, Change sensor Q 14 days, Disp: 2 each, Rfl: 11    HumaLOG KwikPen Insulin 100 unit/mL injection, up to 15 units Subcutaneous three times a day per sliding scaleup to 15 units Subcutaneous three times a day per sliding scale (Patient taking differently: up to 15 units Subcutaneous three times a day per sliding scale.    151-200 2 units 201-250 4 units 251-300 6 units 301-350 8 units 351-400 10 units), Disp: 5 each, Rfl: 3    ipratropium-albuteroL (Duo-Neb) 0.5-2.5 mg/3 mL nebulizer solution, INHALE THE CONTENTS OF 1 VIAL VIA NEBULIZER EVERY 6 HOURS AS NEEDED., Disp: 360 mL, Rfl: 0    Lantus Solostar U-100 Insulin 100 unit/mL (3 mL) pen, Inject 10 Units under the skin once daily at bedtime., Disp: 9 mL, Rfl: 3    levothyroxine (Synthroid, Levoxyl) 200 mcg tablet, Take 1 tablet (200 mcg) by mouth once daily in the morning. Take before meals., Disp: 90 tablet, Rfl: 3    levothyroxine (Synthroid, Levoxyl) 50 mcg tablet, Take 1 tablet (50 mcg) by mouth once daily in the morning. Take before meals., Disp: 90 tablet, Rfl: 3    metoprolol succinate XL (Toprol-XL) 50 mg 24 hr tablet, TAKE ONE TABLET BY MOUTH TWO TIMES A DAY, Disp: 180 tablet, Rfl: 0    oxygen DME/Hospice (O2) therapy, Inhale 3 L/min once daily at bedtime. Indications: decreased oxygen in the tissues or blood, Disp: , Rfl:     pantoprazole (ProtoNix) 40 mg EC tablet, Take 1 tablet (40 mg) by mouth 2 times a day., Disp: 60 tablet, Rfl: 6    venlafaxine XR (Effexor-XR) 75 mg 24 hr capsule, Take 2 capsules (150 mg) by mouth once daily., Disp: 180 capsule, Rfl: 3     Review of Systems  Constitutional:  Negative for appetite change, chills and fever, unexplained weight loss.   HENT: Positive for hearing loss.   Respiratory: Negative for shortness of breath, cough and wheezing.    Cardiovascular: Negative for chest pain, palpitations, and leg swelling.   Gastrointestinal: Positive for intermittent nausea.  "Negative for abdominal pain, constipation, diarrhea.   Endocrine: Positive for diabetes.   Genitourinary:  Negative for dysuria.   Musculoskeletal:  Positive for arthralgias, unsteady gait  Neurological: Positive for weakness. Negative for dizziness, lightheadedness.   Skin: Negative for wound.   Psychiatric/Behavioral: Positive for anxiety, depression, difficulty sleeping     Objective   /64 (BP Location: Left arm, Patient Position: Sitting, BP Cuff Size: Adult)   Pulse 75   Temp 36.2 °C (97.1 °F) (Temporal)   Resp 18   Ht 1.676 m (5' 6\")   Wt 69.9 kg (154 lb)   SpO2 98%   BMI 24.86 kg/m²     Physical Exam  Constitutional:       General: She is not in acute distress.     Appearance: Alert. Disheveled. She is chronically ill. She is not toxic-appearing.   HENT:      Head: Normocephalic and atraumatic.      Nose: Nose normal.      Mouth/Throat:      Mouth: Mucous membranes are moist.      Pharynx: Oropharynx is clear.   Eyes:      Conjunctiva/sclera: Conjunctivae normal.      Pupils: Pupils are equal, round, and reactive to light.   Cardiovascular:      Rate and Rhythm: Normal rate and regular rhythm.      Pulses: Normal pulses.      Heart sounds: Normal heart sounds.      Tunneled dialysis cath to right chest     No edema to lower extremities   Pulmonary:      Effort: Pulmonary effort is normal.      Breath sounds: Lungs diminished. No wheezing, rales or rhonchi. No cough on exam.   Abdominal:      General: Bowel sounds are normal.      Palpations: Abdomen is soft.   Musculoskeletal:         General: Normal range of motion.   Skin:     General: Skin is warm and dry.      Coloration: Skin is pale. Wound to right lower leg healed.   Neurological:      General: No focal deficit present.      Mental Status: She is alert and oriented to person, place, and time.      Gait unsteady, uses Rolator   Psychiatric:         Mood and Affect: Mood normal.         Behavior: Behavior normal.      Poor insight and " judgement     Assessment/Plan   Diagnoses and all orders for this visit:  Type 2 diabetes mellitus with hyperglycemia, with long-term current use of insulin (Multi)  -chronic, poorly controlled  -pt non compliant with insulin, meds and diet habits   -will continue to educate/encourage healthier choices, compliance with medications    End stage renal disease (Multi)  -chronic, managed with dialysis T/T/S    Right retinal detachment  -status post surgical repair on 8/1  -follow up with ophthalmology as scheduled     Open wound of right lower leg, subsequent encounter  -chronic, resolved   -[facility] discharged patient today  -Deanna Villa, wound NP to see patient next week and discharge if wound remains healed     Trouble walking  -chronic, ambulatory with Rolator   -continue safety measures and supportive care    Anxiety with depression   -chronic, followed by [facility]  -continue venlafaxine    Patient delicately stable. She continues to be non compliant with medications and diet habits. Will follow up with pt in 4 weeks as she is high risk for hospitalization. Pt to contact house calls office with any acute concerns or medication needs.        Poly Diaz, APRN-CNP

## 2024-08-26 ENCOUNTER — APPOINTMENT (OUTPATIENT)
Dept: BEHAVIORAL HEALTH | Facility: CLINIC | Age: 69
End: 2024-08-26
Payer: MEDICARE

## 2024-08-28 ENCOUNTER — OFFICE VISIT (OUTPATIENT)
Dept: VASCULAR SURGERY | Facility: CLINIC | Age: 69
End: 2024-08-28
Payer: MEDICARE

## 2024-08-28 VITALS
SYSTOLIC BLOOD PRESSURE: 164 MMHG | HEIGHT: 67 IN | DIASTOLIC BLOOD PRESSURE: 72 MMHG | WEIGHT: 156.6 LBS | BODY MASS INDEX: 24.58 KG/M2 | HEART RATE: 71 BPM

## 2024-08-28 DIAGNOSIS — N18.6 END STAGE RENAL DISEASE (MULTI): Primary | ICD-10-CM

## 2024-08-28 PROCEDURE — 3008F BODY MASS INDEX DOCD: CPT | Performed by: NURSE PRACTITIONER

## 2024-08-28 PROCEDURE — 3078F DIAST BP <80 MM HG: CPT | Performed by: NURSE PRACTITIONER

## 2024-08-28 PROCEDURE — 3062F POS MACROALBUMINURIA REV: CPT | Performed by: NURSE PRACTITIONER

## 2024-08-28 PROCEDURE — 1126F AMNT PAIN NOTED NONE PRSNT: CPT | Performed by: NURSE PRACTITIONER

## 2024-08-28 PROCEDURE — 3077F SYST BP >= 140 MM HG: CPT | Performed by: NURSE PRACTITIONER

## 2024-08-28 PROCEDURE — 99211 OFF/OP EST MAY X REQ PHY/QHP: CPT | Performed by: NURSE PRACTITIONER

## 2024-08-28 PROCEDURE — 1159F MED LIST DOCD IN RCRD: CPT | Performed by: NURSE PRACTITIONER

## 2024-08-28 PROCEDURE — 1157F ADVNC CARE PLAN IN RCRD: CPT | Performed by: NURSE PRACTITIONER

## 2024-08-28 PROCEDURE — 1123F ACP DISCUSS/DSCN MKR DOCD: CPT | Performed by: NURSE PRACTITIONER

## 2024-08-28 RX ORDER — POTASSIUM CHLORIDE 20 MEQ/1
20 TABLET, EXTENDED RELEASE ORAL DAILY
COMMUNITY
Start: 2023-03-02

## 2024-08-28 RX ORDER — AMLODIPINE BESYLATE 5 MG/1
5 TABLET ORAL DAILY
COMMUNITY

## 2024-08-28 ASSESSMENT — PATIENT HEALTH QUESTIONNAIRE - PHQ9
10. IF YOU CHECKED OFF ANY PROBLEMS, HOW DIFFICULT HAVE THESE PROBLEMS MADE IT FOR YOU TO DO YOUR WORK, TAKE CARE OF THINGS AT HOME, OR GET ALONG WITH OTHER PEOPLE: VERY DIFFICULT
8. MOVING OR SPEAKING SO SLOWLY THAT OTHER PEOPLE COULD HAVE NOTICED. OR THE OPPOSITE, BEING SO FIGETY OR RESTLESS THAT YOU HAVE BEEN MOVING AROUND A LOT MORE THAN USUAL: NOT AT ALL
SUM OF ALL RESPONSES TO PHQ9 QUESTIONS 1 AND 2: 4
9. THOUGHTS THAT YOU WOULD BE BETTER OFF DEAD, OR OF HURTING YOURSELF: NOT AT ALL
4. FEELING TIRED OR HAVING LITTLE ENERGY: MORE THAN HALF THE DAYS
7. TROUBLE CONCENTRATING ON THINGS, SUCH AS READING THE NEWSPAPER OR WATCHING TELEVISION: MORE THAN HALF THE DAYS
1. LITTLE INTEREST OR PLEASURE IN DOING THINGS: MORE THAN HALF THE DAYS
3. TROUBLE FALLING OR STAYING ASLEEP OR SLEEPING TOO MUCH: MORE THAN HALF THE DAYS
2. FEELING DOWN, DEPRESSED OR HOPELESS: MORE THAN HALF THE DAYS
5. POOR APPETITE OR OVEREATING: NEARLY EVERY DAY
6. FEELING BAD ABOUT YOURSELF - OR THAT YOU ARE A FAILURE OR HAVE LET YOURSELF OR YOUR FAMILY DOWN: SEVERAL DAYS
SUM OF ALL RESPONSES TO PHQ QUESTIONS 1-9: 14

## 2024-08-28 ASSESSMENT — ENCOUNTER SYMPTOMS
LOSS OF SENSATION IN FEET: 0
DEPRESSION: 1
OCCASIONAL FEELINGS OF UNSTEADINESS: 1

## 2024-08-28 ASSESSMENT — PAIN SCALES - GENERAL: PAINLEVEL: 0-NO PAIN

## 2024-08-28 NOTE — PATIENT INSTRUCTIONS
Daphne,    It was great to see you again!  Thank you for choosing  vascular surgery for your care.    You had a AV graft that was placed by Dr. Jamee Haddad on June 26, 2024.  This was an AcuSeal graft and can be used for dialysis.  Please begin virgin fistula protocol.  After you have had dialysis successfully for 2 straight weeks, you can call our office to be considered for removal of catheter.  Please call [phone], option 2

## 2024-08-28 NOTE — PROGRESS NOTES
History Of Present Illness  Daphne Morris is a 69 y.o. female presenting with a history of end-stage renal disease requiring hemodialysis.  Patient had a right brachial axillary loop graft placed on 2024.  This was an AcuSeal graft.  She is still using a tunneled chest catheter for dialysis.  She offers no specific complaints today.  No complaints of hand pain or dysfunction.     Past Medical History  She has a past medical history of Anal fissure (10/12/2023), Anemia, ASHD (arteriosclerotic heart disease), At risk for falls, Atrial fibrillation (Multi), CHF (congestive heart failure) (Multi), CKD (chronic kidney disease), COPD (chronic obstructive pulmonary disease) (Multi), CVA (cerebral vascular accident) (Multi), Depression, Diabetes mellitus (Multi), GERD (gastroesophageal reflux disease), H/O pleural effusion, Hyperlipidemia, Hypertension, Hypothyroidism, Hypoxia, Impacted cerumen, left ear, Noncompliance, Osteoarthritis, Perianal dermatitis (10/12/2023), Personal history of other diseases of the respiratory system, PVD (peripheral vascular disease) (CMS-HCC), Renal carcinoma, right (Multi), Respiratory failure (Multi), TIA (transient ischemic attack), Vasculitis (CMS-HCC) (10/12/2023), and Weakness.    Surgical History  She has a past surgical history that includes MR angio chest w and wo IV contrast (2023); MR angio head wo IV contrast (2021);  section, classic; Shoulder surgery; Ankle surgery; Cataract extraction (Right); Nephrectomy (2021); and Cardiac catheterization (N/A, 2024).     Social History  She reports that she has been smoking cigarettes. She started smoking about 55 years ago. She has a 27.8 pack-year smoking history. She has never been exposed to tobacco smoke. She has never used smokeless tobacco. She reports that she does not currently use alcohol. She reports that she does not currently use drugs after having used the following drugs:  Marijuana.    Family History  Family History   Problem Relation Name Age of Onset    Hypertension Mother      Diabetes Father      Heart disease Father      Cancer Sister      Cancer Brother      Diabetes Daughter      Asthma Daughter      Heart attack Daughter      Diabetes Maternal Grandfather      Heart disease Paternal Grandmother      Diabetes Other      Hypertension Other      COPD Other      Other (chronic lung disease) Other          Allergies  Bee venom protein (honey bee); Citalopram; Codeine; Influenza virus vaccines; Latex; Latex, natural rubber; Lisinopril; Penicillins; Adhesive tape-silicones; Amoxicillin; Doxycycline; Oseltamivir; Phenyleph-min oil-petrolatum; Phenyleph-shark oil-glyc-pet; Phenylephrine; Prednisone; Tuberculin ppd; and Xylometazoline    Review of Systems    CONSTITUTIONAL: Denies weight loss, fever and chills.    HEENT: Denies changes in hearing.  Recent eye surgery, changes in vision.    RESPIRATORY: Denies SOB and cough.    CV: Denies palpitations and CP.    GI: Denies abdominal pain, nausea, vomiting and diarrhea.    : Denies dysuria and urinary frequency.    MSK: Denies myalgia and joint pain.    SKIN: Denies rash and pruritus.    VASC: Denies claudication, ischemic rest pain, or open wounds or sores    NEUROLOGICAL: Denies headache and syncope.    PSYCHIATRIC: Denies recent changes in mood. Denies anxiety and depression.     Physical Exam    General: Pt is alert and oriented x 3. Pleasant, conversive  HEENT: Head is atraumatic, normocephalic.   Cardiac: Normal S1-S2.  Regular rate and rhythm.  No murmurs.  Respiratory: Lungs clear to auscultation.  No adventitious sounds.  Abdomen: Soft, nondistended, nontender.  Bowel sounds x4 quadrants.  Pulse exam: Upper extremities are warm and well-perfused.  Lower extremities are warm and well-perfused.  Extremities: No significant edema noted.  Extremities are warm to the touch and normal in color.  No open wounds or sores.  Positive  "bruit to upper extremity AV graft on the right.  Tunneled chest catheter with no surrounding tissue erythema or induration.  No drainage on my examination.  Neuro: Moves all extremities spontaneously.  No focal deficits.  Psych: Appropriate affect.  Answers questions appropriately.     Last Recorded Vitals  /72 (BP Location: Left arm, Patient Position: Sitting, BP Cuff Size: Large adult)   Pulse 71   Wt 71 kg (156 lb 9.6 oz)     Relevant Results    Current Outpatient Medications   Medication Instructions    acetaminophen (TYLENOL) 1,000 mg, oral, 2 times daily    albuterol 90 mcg/actuation inhaler 2 puffs, inhalation, Every 4 hours PRN    amiodarone (PACERONE) 200 mg, oral, Daily with breakfast    amLODIPine (NORVASC) 5 mg, oral, Daily    aspirin 81 mg, oral, Daily    atorvastatin (LIPITOR) 40 mg, oral, Daily    BD Calista 2nd Gen Pen Needle 32 gauge x 5/32\" needle USE SUBCUTANEOUSLY AS DIRECTED TWICE DAILY    clopidogrel (PLAVIX) 75 mg, oral, Daily    ferrous gluconate (FERGON) 324 mg, oral, Daily with breakfast    FreeStyle Shakir 14 Day Sensor kit USE AS DIRECTED TO CHECK SUGARS 3 TO 4 TIMES DAILY    FreeStyle Shakir 2 [place] misc Use as instructed    FreeStyle Shakir 3 [place] misc Use as instructed    FreeStyle Shakir 3 Sensor device Change sensor Q 14 days    HumaLOG KwikPen Insulin 100 unit/mL injection up to 15 units Subcutaneous three times a day per sliding scaleup to 15 units Subcutaneous three times a day per sliding scale    ipratropium-albuteroL (Duo-Neb) 0.5-2.5 mg/3 mL nebulizer solution INHALE THE CONTENTS OF 1 VIAL VIA NEBULIZER EVERY 6 HOURS AS NEEDED.    Lantus Solostar U-100 Insulin 10 Units, subcutaneous, Nightly    levothyroxine (SYNTHROID, LEVOXYL) 200 mcg, oral, Daily before breakfast    levothyroxine (SYNTHROID, LEVOXYL) 50 mcg, oral, Daily before breakfast    metoprolol succinate XL (TOPROL-XL) 50 mg, oral, 2 times daily    oxygen DME/Hospice (O2) 3 L/min, inhalation, Nightly    " pantoprazole (PROTONIX) 40 mg, oral, 2 times daily    potassium chloride CR 20 mEq ER tablet 20 mEq, oral, Daily    prednisoLONE acetate (Pred-Forte) 1 % ophthalmic suspension 1 drop, Right Eye, 4 times daily    venlafaxine XR (EFFEXOR-XR) 150 mg, oral, Daily             Assessment/Plan   End-stage renal disease requiring hemodialysis  Status post brachial axillary loop graft on the right.    Patient has well-healed surgical incisions and a functioning AV loop graft.  She has not yet used the graft for dialysis however.  I recommend that we begin using the graft for dialysis starting tomorrow.  Will likely begin with a virgin fistula protocol.  After she has been using the graft successfully for 2 weeks, I advised her to call our office for consideration of removal of permacath.         Lewis Norwood, APRN-CNP

## 2024-09-05 ENCOUNTER — TELEPHONE (OUTPATIENT)
Dept: PRIMARY CARE | Facility: CLINIC | Age: 69
End: 2024-09-05
Payer: MEDICARE

## 2024-09-05 NOTE — TELEPHONE ENCOUNTER
ARMIDA. In home follow up visit 9/4. Asleep in bed upon arrival at 2pm. Missed several days of medications over past 2 weeks. Stated she thought she took them. Pill boxes filled for another 2 weeks. New Freestyle Shakir sensor applied to left arm. Stated dialysis has started to use her right arm fistula and she will be scheduled for catheter removal.  Reported a fall from bed this past week. Was able to crawl and get herself up off of the floor. No injury reported.   Referral was previously placed for a Passport Assessment; however, they were unable to reach her and sent a letter for her to call and schedule. Phone number for [facility] given to patient. Instructed to call and schedule Passport Assessment in hopes that she can receive home health aide assistance.  Will follow up in 2 weeks.

## 2024-09-18 ENCOUNTER — DOCUMENTATION (OUTPATIENT)
Dept: PRIMARY CARE | Facility: CLINIC | Age: 69
End: 2024-09-18
Payer: MEDICARE

## 2024-09-18 ENCOUNTER — ANESTHESIA EVENT (OUTPATIENT)
Dept: OPERATING ROOM | Age: 69
End: 2024-09-18
Payer: MEDICARE

## 2024-09-18 NOTE — PROGRESS NOTES
"House Calls CM follow up in home visit. Sitting at table. Vision has declined again. Per patient, procedure for retinal detachment failed. She is scheduled for surgery again tomorrow, 9/19. Has limited support in home. Has not called for Passport Assessment. Inconsistent with taking medications over past 2 weeks. Pill boxes filled for another 2 weeks. New Freestyle Shakir sensor applied. BG reading \"HI\". Had HI readings on 9/15 as well. Assisted with Humalog pen and patient administered 10 units during visit. Remains noncompliant with diabetic diet. Re-educated on foods to avoid. Provider updated. Will follow up in 2 weeks.  "

## 2024-09-19 ENCOUNTER — ANESTHESIA (OUTPATIENT)
Dept: OPERATING ROOM | Age: 69
End: 2024-09-19
Payer: MEDICARE

## 2024-09-19 ENCOUNTER — HOSPITAL ENCOUNTER (OUTPATIENT)
Age: 69
Setting detail: OUTPATIENT SURGERY
Discharge: HOME | End: 2024-09-19
Attending: OPHTHALMOLOGY | Admitting: OPHTHALMOLOGY
Payer: MEDICARE

## 2024-09-19 VITALS
SYSTOLIC BLOOD PRESSURE: 170 MMHG | DIASTOLIC BLOOD PRESSURE: 86 MMHG | BODY MASS INDEX: 23.86 KG/M2 | HEART RATE: 86 BPM | WEIGHT: 152 LBS | OXYGEN SATURATION: 97 % | RESPIRATION RATE: 16 BRPM | TEMPERATURE: 97.8 F | HEIGHT: 67 IN

## 2024-09-19 LAB
GLUCOSE BLD-MCNC: 231 MG/DL (ref 74–99)
POTASSIUM SERPL-SCNC: 4.9 MMOL/L (ref 3.5–5)

## 2024-09-19 PROCEDURE — 84132 ASSAY OF SERUM POTASSIUM: CPT

## 2024-09-19 PROCEDURE — 3600000013 HC SURGERY LEVEL 3 ADDTL 15MIN: Performed by: OPHTHALMOLOGY

## 2024-09-19 PROCEDURE — 3600000003 HC SURGERY LEVEL 3 BASE: Performed by: OPHTHALMOLOGY

## 2024-09-19 PROCEDURE — 7100000010 HC PHASE II RECOVERY - FIRST 15 MIN: Performed by: OPHTHALMOLOGY

## 2024-09-19 PROCEDURE — 2500000003 HC RX 250 WO HCPCS: Performed by: OPHTHALMOLOGY

## 2024-09-19 PROCEDURE — 94640 AIRWAY INHALATION TREATMENT: CPT

## 2024-09-19 PROCEDURE — 6360000002 HC RX W HCPCS: Performed by: OPHTHALMOLOGY

## 2024-09-19 PROCEDURE — 2580000003 HC RX 258

## 2024-09-19 PROCEDURE — 7100000011 HC PHASE II RECOVERY - ADDTL 15 MIN: Performed by: OPHTHALMOLOGY

## 2024-09-19 PROCEDURE — 2720000010 HC SURG SUPPLY STERILE: Performed by: OPHTHALMOLOGY

## 2024-09-19 PROCEDURE — 3700000001 HC ADD 15 MINUTES (ANESTHESIA): Performed by: OPHTHALMOLOGY

## 2024-09-19 PROCEDURE — 6360000002 HC RX W HCPCS

## 2024-09-19 PROCEDURE — 3700000000 HC ANESTHESIA ATTENDED CARE: Performed by: OPHTHALMOLOGY

## 2024-09-19 PROCEDURE — 82962 GLUCOSE BLOOD TEST: CPT

## 2024-09-19 PROCEDURE — 6370000000 HC RX 637 (ALT 250 FOR IP): Performed by: OPHTHALMOLOGY

## 2024-09-19 PROCEDURE — 2709999900 HC NON-CHARGEABLE SUPPLY: Performed by: OPHTHALMOLOGY

## 2024-09-19 PROCEDURE — 6370000000 HC RX 637 (ALT 250 FOR IP): Performed by: ANESTHESIOLOGY

## 2024-09-19 RX ORDER — SODIUM CHLORIDE 9 MG/ML
INJECTION, SOLUTION INTRAVENOUS PRN
Status: CANCELLED | OUTPATIENT
Start: 2024-09-19

## 2024-09-19 RX ORDER — SODIUM CHLORIDE 0.9 % (FLUSH) 0.9 %
5-40 SYRINGE (ML) INJECTION PRN
Status: CANCELLED | OUTPATIENT
Start: 2024-09-19

## 2024-09-19 RX ORDER — TROPICAMIDE 10 MG/ML
1 SOLUTION/ DROPS OPHTHALMIC PRN
Status: COMPLETED | OUTPATIENT
Start: 2024-09-19 | End: 2024-09-19

## 2024-09-19 RX ORDER — IPRATROPIUM BROMIDE AND ALBUTEROL SULFATE 2.5; .5 MG/3ML; MG/3ML
1 SOLUTION RESPIRATORY (INHALATION) ONCE
Status: COMPLETED | OUTPATIENT
Start: 2024-09-19 | End: 2024-09-19

## 2024-09-19 RX ORDER — MIDAZOLAM HYDROCHLORIDE 1 MG/ML
INJECTION INTRAMUSCULAR; INTRAVENOUS
Status: DISCONTINUED | OUTPATIENT
Start: 2024-09-19 | End: 2024-09-19 | Stop reason: SDUPTHER

## 2024-09-19 RX ORDER — PHENYLEPHRINE HYDROCHLORIDE 100 MG/ML
1 SOLUTION/ DROPS OPHTHALMIC PRN
Status: DISCONTINUED | OUTPATIENT
Start: 2024-09-19 | End: 2024-09-19 | Stop reason: HOSPADM

## 2024-09-19 RX ORDER — SODIUM CHLORIDE 0.9 % (FLUSH) 0.9 %
5-40 SYRINGE (ML) INJECTION PRN
Status: DISCONTINUED | OUTPATIENT
Start: 2024-09-19 | End: 2024-09-19 | Stop reason: HOSPADM

## 2024-09-19 RX ORDER — SODIUM CHLORIDE 9 MG/ML
INJECTION, SOLUTION INTRAVENOUS PRN
Status: DISCONTINUED | OUTPATIENT
Start: 2024-09-19 | End: 2024-09-19 | Stop reason: HOSPADM

## 2024-09-19 RX ORDER — PROPARACAINE HYDROCHLORIDE 5 MG/ML
1 SOLUTION/ DROPS OPHTHALMIC PRN
Status: DISCONTINUED | OUTPATIENT
Start: 2024-09-19 | End: 2024-09-19 | Stop reason: HOSPADM

## 2024-09-19 RX ORDER — SODIUM CHLORIDE 9 MG/ML
INJECTION, SOLUTION INTRAVENOUS
Status: DISCONTINUED | OUTPATIENT
Start: 2024-09-19 | End: 2024-09-19 | Stop reason: SDUPTHER

## 2024-09-19 RX ORDER — PHENYLEPHRINE HYDROCHLORIDE 25 MG/ML
1 SOLUTION/ DROPS OPHTHALMIC PRN
Status: COMPLETED | OUTPATIENT
Start: 2024-09-19 | End: 2024-09-19

## 2024-09-19 RX ORDER — SODIUM CHLORIDE 0.9 % (FLUSH) 0.9 %
5-40 SYRINGE (ML) INJECTION EVERY 12 HOURS SCHEDULED
Status: CANCELLED | OUTPATIENT
Start: 2024-09-19

## 2024-09-19 RX ORDER — SODIUM CHLORIDE 0.9 % (FLUSH) 0.9 %
5-40 SYRINGE (ML) INJECTION EVERY 12 HOURS SCHEDULED
Status: DISCONTINUED | OUTPATIENT
Start: 2024-09-19 | End: 2024-09-19 | Stop reason: HOSPADM

## 2024-09-19 RX ORDER — DEXAMETHASONE SODIUM PHOSPHATE 10 MG/ML
INJECTION INTRAMUSCULAR; INTRAVENOUS PRN
Status: DISCONTINUED | OUTPATIENT
Start: 2024-09-19 | End: 2024-09-19 | Stop reason: ALTCHOICE

## 2024-09-19 RX ADMIN — PHENYLEPHRINE HYDROCHLORIDE 1 DROP: 25 SOLUTION/ DROPS OPHTHALMIC at 14:01

## 2024-09-19 RX ADMIN — Medication 1 DROP: at 14:11

## 2024-09-19 RX ADMIN — Medication 1 DROP: at 13:51

## 2024-09-19 RX ADMIN — Medication 1 DROP: at 14:01

## 2024-09-19 RX ADMIN — PHENYLEPHRINE HYDROCHLORIDE 1 DROP: 25 SOLUTION/ DROPS OPHTHALMIC at 14:11

## 2024-09-19 RX ADMIN — MIDAZOLAM 2 MG: 1 INJECTION INTRAMUSCULAR; INTRAVENOUS at 16:12

## 2024-09-19 RX ADMIN — PHENYLEPHRINE HYDROCHLORIDE 1 DROP: 25 SOLUTION/ DROPS OPHTHALMIC at 13:51

## 2024-09-19 RX ADMIN — SODIUM CHLORIDE: 9 INJECTION, SOLUTION INTRAVENOUS at 16:01

## 2024-09-19 RX ADMIN — IPRATROPIUM BROMIDE AND ALBUTEROL SULFATE 1 DOSE: .5; 3 SOLUTION RESPIRATORY (INHALATION) at 13:55

## 2024-09-19 ASSESSMENT — PAIN - FUNCTIONAL ASSESSMENT: PAIN_FUNCTIONAL_ASSESSMENT: 0-10

## 2024-09-19 ASSESSMENT — PAIN SCALES - GENERAL
PAINLEVEL_OUTOF10: 0

## 2024-09-19 ASSESSMENT — ENCOUNTER SYMPTOMS: SHORTNESS OF BREATH: 1

## 2024-09-19 ASSESSMENT — LIFESTYLE VARIABLES: SMOKING_STATUS: 1

## 2024-09-20 ENCOUNTER — APPOINTMENT (OUTPATIENT)
Dept: PRIMARY CARE | Facility: CLINIC | Age: 69
End: 2024-09-20
Payer: MEDICARE

## 2024-09-20 ENCOUNTER — TELEPHONE (OUTPATIENT)
Dept: PRIMARY CARE | Facility: CLINIC | Age: 69
End: 2024-09-20
Payer: MEDICARE

## 2024-09-23 ENCOUNTER — APPOINTMENT (OUTPATIENT)
Dept: PRIMARY CARE | Facility: CLINIC | Age: 69
End: 2024-09-23
Payer: MEDICARE

## 2024-09-24 ENCOUNTER — TELEPHONE (OUTPATIENT)
Dept: PRIMARY CARE | Facility: CLINIC | Age: 69
End: 2024-09-24
Payer: MEDICARE

## 2024-09-25 ENCOUNTER — OFFICE VISIT (OUTPATIENT)
Dept: PRIMARY CARE | Facility: CLINIC | Age: 69
End: 2024-09-25
Payer: MEDICARE

## 2024-09-25 VITALS
RESPIRATION RATE: 18 BRPM | HEART RATE: 67 BPM | OXYGEN SATURATION: 98 % | SYSTOLIC BLOOD PRESSURE: 142 MMHG | HEIGHT: 67 IN | BODY MASS INDEX: 24.17 KG/M2 | WEIGHT: 154 LBS | TEMPERATURE: 96.9 F | DIASTOLIC BLOOD PRESSURE: 66 MMHG

## 2024-09-25 DIAGNOSIS — N18.6 ESRD (END STAGE RENAL DISEASE) (MULTI): Primary | ICD-10-CM

## 2024-09-25 DIAGNOSIS — R41.89 COGNITIVE IMPAIRMENT: ICD-10-CM

## 2024-09-25 DIAGNOSIS — H33.21 RIGHT RETINAL DETACHMENT: ICD-10-CM

## 2024-09-25 DIAGNOSIS — R26.2 TROUBLE WALKING: ICD-10-CM

## 2024-09-25 DIAGNOSIS — R29.6 RECURRENT FALLS: ICD-10-CM

## 2024-09-25 DIAGNOSIS — E11.65 TYPE 2 DIABETES MELLITUS WITH HYPERGLYCEMIA, WITH LONG-TERM CURRENT USE OF INSULIN: ICD-10-CM

## 2024-09-25 DIAGNOSIS — Z79.4 TYPE 2 DIABETES MELLITUS WITH HYPERGLYCEMIA, WITH LONG-TERM CURRENT USE OF INSULIN: ICD-10-CM

## 2024-09-25 PROCEDURE — 99349 HOME/RES VST EST MOD MDM 40: CPT | Performed by: NURSE PRACTITIONER

## 2024-09-25 PROCEDURE — 1160F RVW MEDS BY RX/DR IN RCRD: CPT | Performed by: NURSE PRACTITIONER

## 2024-09-25 PROCEDURE — 1157F ADVNC CARE PLAN IN RCRD: CPT | Performed by: NURSE PRACTITIONER

## 2024-09-25 PROCEDURE — 1126F AMNT PAIN NOTED NONE PRSNT: CPT | Performed by: NURSE PRACTITIONER

## 2024-09-25 PROCEDURE — 3062F POS MACROALBUMINURIA REV: CPT | Performed by: NURSE PRACTITIONER

## 2024-09-25 PROCEDURE — 1123F ACP DISCUSS/DSCN MKR DOCD: CPT | Performed by: NURSE PRACTITIONER

## 2024-09-25 PROCEDURE — 3008F BODY MASS INDEX DOCD: CPT | Performed by: NURSE PRACTITIONER

## 2024-09-25 PROCEDURE — 3078F DIAST BP <80 MM HG: CPT | Performed by: NURSE PRACTITIONER

## 2024-09-25 PROCEDURE — 3077F SYST BP >= 140 MM HG: CPT | Performed by: NURSE PRACTITIONER

## 2024-09-25 PROCEDURE — 1159F MED LIST DOCD IN RCRD: CPT | Performed by: NURSE PRACTITIONER

## 2024-09-25 RX ORDER — TOBRAMYCIN 3 MG/ML
SOLUTION/ DROPS OPHTHALMIC
COMMUNITY
Start: 2024-09-13

## 2024-09-25 ASSESSMENT — PAIN SCALES - GENERAL: PAINLEVEL: 0-NO PAIN

## 2024-09-25 NOTE — PROGRESS NOTES
"Subjective   Patient ID: Daphne Morris is a 69 y.o. female who presents for Follow-up (4 week follow up, multiple medical issues ).    Visit for 68 y/o female seen today in private home, alone for 1 month follow up of multiple medical issues. Pt is sitting on end table in living room. She is alert, able to answer most questions regarding her health but is an overall poor historian and most of the health history is supplemented by patients medical chart. Pt lives alone in a mobile home that is very unkept. There is garbage throughout home, all over the counter tops and floor in dining room and living room. There are multiple leftover pizza boxes and fast food boxes/bags throughout kitchen. Pt reports that she is having a difficult time cleaning her house, mostly due to recent eye surgery, difficulty seeing the floor when something drops. Pt has a vehicle but is not driving at this time due to declining health and recent vision concerns. Pt recently had right eye surgery for retinal detachment. Unfortunately, she required a second eye surgery on 9/19 as her retina detached again. She admits that she is very inconsistent taking her medications and is having trouble taking her eye drops, stating \"I can't squeeze the bottles\". Pt is not taking her insulin at all as she is unable to see the units on her syringes. Pt has ESRD, dialysis dependent on T/T/S. Pt reports that she missed dialysis on Tuesday because she thought she was having eye surgery which was actually scheduled Thursday 9/19. She missed her dialysis 9/19 and was offered a dialysis appointment on Friday 9/20 but patient declined and then missed her Saturday appointment on 9/21 due to \"not feeling well\". She did go to dialysis yesterday and saw nephrologist Dr. Mitchell. Pt reports taking [place] to dialysis now. Pt is ambulatory with a Rolator. She admits to having a fall on Sunday 9/22. She went to get up and slid out of her bed. She tells me that she was " "on the ground for 12 hours. Her cell phone was dead and she was unable to get to her house phone. After about 12 hours, she was able to scoot to the phone and called her nephew for assistance. Her nephew came to the home and picked her up off the ground. Pt denies head injury or LOC. Pt did not go to the emergency department for evaluation. Pt does not have a life alert. She was given information to apply for Passport but has not contacted Passport back yet to see if she qualifies for help in the home. Pt reports that she does not want to go to an Assisted Living facility and would like to remain in home as long as possible.     Home Visit:          Medically necessary due to: Illness or condition that results in activity lmitation or restriction that impacts the ability to leave home such as:, unsteady gait/poor balance, shortness of breath with exertion         Current Outpatient Medications:     tobramycin (Tobrex) 0.3 % ophthalmic solution, INSTILL 1 DROP INTO THE RIGHT EYE 4 TIMES A DAY, Disp: , Rfl:     acetaminophen (Tylenol) 500 mg tablet, Take 2 tablets (1,000 mg) by mouth 2 times a day., Disp: , Rfl:     albuterol 90 mcg/actuation inhaler, INHALE 2 PUFFS BY MOUTH EVERY 4 HOURS AS NEEDED, Disp: 8.5 g, Rfl: 0    amiodarone (Pacerone) 200 mg tablet, Take 1 tablet (200 mg) by mouth once daily with breakfast., Disp: 90 tablet, Rfl: 1    amLODIPine (Norvasc) 5 mg tablet, Take 1 tablet (5 mg) by mouth once daily., Disp: , Rfl:     aspirin 81 mg EC tablet, Take 1 tablet (81 mg) by mouth once daily., Disp: , Rfl:     atorvastatin (Lipitor) 40 mg tablet, Take 1 tablet (40 mg) by mouth once daily., Disp: , Rfl:     BD Calista 2nd Gen Pen Needle 32 gauge x 5/32\" needle, USE SUBCUTANEOUSLY AS DIRECTED TWICE DAILY, Disp: , Rfl:     clopidogrel (Plavix) 75 mg tablet, Take 1 tablet (75 mg) by mouth once daily., Disp: , Rfl:     ferrous gluconate 324 (38 Fe) MG tablet, Take 1 tablet (324 mg) by mouth once daily with " breakfast., Disp: 30 tablet, Rfl: 3    FreeStyle Shakir 14 Day Sensor kit, USE AS DIRECTED TO CHECK SUGARS 3 TO 4 TIMES DAILY, Disp: 1 each, Rfl: 11    FreeStyle Shakir 2 [place] misc, Use as instructed, Disp: 1 each, Rfl: 0    FreeStyle Shakir 3 [place] misc, Use as instructed, Disp: 1 each, Rfl: 0    FreeStyle Shakir 3 Sensor device, Change sensor Q 14 days, Disp: 2 each, Rfl: 11    HumaLOG KwikPen Insulin 100 unit/mL injection, up to 15 units Subcutaneous three times a day per sliding scaleup to 15 units Subcutaneous three times a day per sliding scale (Patient taking differently: up to 15 units Subcutaneous three times a day per sliding scale.    151-200 2 units 201-250 4 units 251-300 6 units 301-350 8 units 351-400 10 units), Disp: 5 each, Rfl: 3    ipratropium-albuteroL (Duo-Neb) 0.5-2.5 mg/3 mL nebulizer solution, INHALE THE CONTENTS OF 1 VIAL VIA NEBULIZER EVERY 6 HOURS AS NEEDED., Disp: 360 mL, Rfl: 0    Lantus Solostar U-100 Insulin 100 unit/mL (3 mL) pen, Inject 10 Units under the skin once daily at bedtime., Disp: 9 mL, Rfl: 3    levothyroxine (Synthroid, Levoxyl) 200 mcg tablet, Take 1 tablet (200 mcg) by mouth once daily in the morning. Take before meals., Disp: 90 tablet, Rfl: 3    levothyroxine (Synthroid, Levoxyl) 50 mcg tablet, Take 1 tablet (50 mcg) by mouth once daily in the morning. Take before meals., Disp: 90 tablet, Rfl: 3    metoprolol succinate XL (Toprol-XL) 50 mg 24 hr tablet, TAKE ONE TABLET BY MOUTH TWO TIMES A DAY, Disp: 180 tablet, Rfl: 0    oxygen DME/Hospice (O2) therapy, Inhale 3 L/min once daily at bedtime. Indications: decreased oxygen in the tissues or blood, Disp: , Rfl:     pantoprazole (ProtoNix) 40 mg EC tablet, Take 1 tablet (40 mg) by mouth 2 times a day., Disp: 60 tablet, Rfl: 6    potassium chloride CR 20 mEq ER tablet, Take 1 tablet (20 mEq) by mouth once daily., Disp: , Rfl:     prednisoLONE acetate (Pred-Forte) 1 % ophthalmic suspension, Administer 1 drop into the right  "eye 4 times a day., Disp: , Rfl:     venlafaxine XR (Effexor-XR) 75 mg 24 hr capsule, Take 2 capsules (150 mg) by mouth once daily., Disp: 180 capsule, Rfl: 3     Review of Systems  Constitutional:  Negative for appetite change, chills and fever, unexplained weight loss.   HENT: Positive for hearing loss. Negative for difficulty swallowing.   Eyes: Positive for poor vision, right eye.   Respiratory: Positive for shortness of breath with exertion. Negative for cough and wheezing.    Cardiovascular: Positive for occasional edema. Negative for chest pain, palpitations.   Gastrointestinal: Positive for intermittent nausea. Negative for abdominal pain, constipation, diarrhea.   Endocrine: Positive for diabetes.   Genitourinary:  Negative for dysuria.   Musculoskeletal:  Positive for arthralgias, unsteady gait, recent fall.   Neurological: Positive for weakness. Negative for dizziness, lightheadedness.   Skin: Negative for wound.   Psychiatric/Behavioral: Positive for anxiety, depression, difficulty sleeping    Objective   /66 (BP Location: Left arm, Patient Position: Sitting, BP Cuff Size: Adult)   Pulse 67   Temp 36.1 °C (96.9 °F) (Temporal)   Resp 18   Ht 1.689 m (5' 6.5\")   Wt 69.9 kg (154 lb)   SpO2 98%   BMI 24.48 kg/m²     Physical Exam  Constitutional:       General: She is not in acute distress.     Appearance: Alert. Disheveled. She is chronically ill. She is not toxic-appearing.   HENT:      Head: Normocephalic and atraumatic.      Nose: Nose normal.      Mouth/Throat:      Mouth: Mucous membranes are moist.      Pharynx: Oropharynx is clear.   Eyes:      Right eye red, swollen.   Cardiovascular:      Rate and Rhythm: Normal rate and regular rhythm.      Pulses: Normal pulses.      Heart sounds: Normal heart sounds.      Tunneled dialysis cath to right chest     Trace edema to bilateral legs, non pitting   Pulmonary:      Effort: Pulmonary effort is normal.      Breath sounds: Lungs diminished. No " wheezing, rales or rhonchi  Abdominal:      General: Bowel sounds are normal.      Palpations: Abdomen is soft.   Musculoskeletal:         General: Normal range of motion. No spinal tenderness on exam.   Skin:     General: Skin is warm and dry.      Coloration: Skin is pale  Neurological:      General: No focal deficit present.      Mental Status: She is alert and oriented to person, place, and time.      Gait unsteady, uses Rolator   Psychiatric:         Mood and Affect: Mood normal.         Behavior: Behavior normal.      Poor insight and judgement. Intermittently crying on exam.     Assessment/Plan   Diagnoses and all orders for this visit:  ESRD (end stage renal disease) (Multi)  -chronic, followed by nephrology  -continue dialysis T/T/S  -encouraged compliance of all dialysis treatments     Type 2 diabetes mellitus with hyperglycemia, with long-term current use of insulin (Multi)  -chronic, poorly controlled, not currently taking insulin   -encouraged healthy diet habits, compliance of all medications     Right retinal detachment  -status post surgical repair on 8/1 and 9/19  -encouraged compliance of eye drops  -follow up with ophthalmology as scheduled    Cognitive impairment  -chronic, memory impairment noted   -pt with poor insight and judgement regarding health    Trouble walking  Recurrent falls  -chronic, ambulatory with Rolator  -last fall was on 9/22  -no s/sx of injury reported   -pt used poor judgement contacting nephew rather than calling 911  -discussed importance of calling 911 for evaluation if she has mechanical fall or lies on the floor for an extended period of time    Patient continues to be non compliant with medications, diet habits and dialysis appointments. Will continue house calls NP program due to limited mobility, difficulty getting out for medical appointments. Will follow up with pt in 1 month as she is high risk for hospitalization. Pt to contact house calls office with any acute  concerns or medication needs.        Poly Diaz, APRN-CNP   ,

## 2024-09-26 ENCOUNTER — TELEPHONE (OUTPATIENT)
Dept: PRIMARY CARE | Facility: CLINIC | Age: 69
End: 2024-09-26
Payer: MEDICARE

## 2024-09-26 NOTE — TELEPHONE ENCOUNTER
Spoke with patient's nephew, Silver Lima. Expressed concern about patient's ability to care for herself at home. Informed Silver that she is not taking her eye drops or medications consistently, despite being put in pill boxes for her every 2 weeks. In addition, is not taking insulin consistently, recently due to vision impairment. She is making poor diet choices as well, of which he is aware. Informed Silver that patient reported she laid on the floor for 12 hours before he arrived to pick her up.   Informed Silver that a Passport referral was placed several months ago and she did not schedule appointment for an assessment. Phone number given to Silver to assist patient to call. Also suggested purchasing a life alert for her due to risk of falls. He stated he is only able to get to her place a couple times a week and did not offer any suggestions to improve the situation. Will follow up regarding Passport assessment.

## 2024-10-02 ENCOUNTER — DOCUMENTATION (OUTPATIENT)
Dept: PRIMARY CARE | Facility: CLINIC | Age: 69
End: 2024-10-02
Payer: MEDICARE

## 2024-10-02 NOTE — PROGRESS NOTES
House Calls CM follow up in home visit. Sitting up at table. Stated she is having more trouble with vision today and is taking eye drops as she is able. Did eye drops during visit. Took 5 days of medications over past 2 weeks. Does not seem to realize she is not taking consistently. Freestyle Shakir sensor on left arm was . Refills are at pharmacy. Will have someone  for her. Stated she is trying to take Lantus at night by clicks on pen. Her nephew did help her get in touch with Passport. Stated she has an assessment scheduled for this Friday 10/4. Again discussed concerns regarding her ability to care for self with limited support. Continues to express she does not want to leave her home. Will follow up in 2 weeks and with [facility] after assessment.

## 2024-10-12 DIAGNOSIS — Z79.4 TYPE 2 DIABETES MELLITUS WITH HYPERGLYCEMIA, WITH LONG-TERM CURRENT USE OF INSULIN: ICD-10-CM

## 2024-10-12 DIAGNOSIS — E03.9 HYPOTHYROIDISM, UNSPECIFIED TYPE: ICD-10-CM

## 2024-10-12 DIAGNOSIS — E11.65 TYPE 2 DIABETES MELLITUS WITH HYPERGLYCEMIA, WITH LONG-TERM CURRENT USE OF INSULIN: ICD-10-CM

## 2024-10-14 ENCOUNTER — DOCUMENTATION (OUTPATIENT)
Dept: PRIMARY CARE | Facility: CLINIC | Age: 69
End: 2024-10-14
Payer: MEDICARE

## 2024-10-14 DIAGNOSIS — D50.0 IRON DEFICIENCY ANEMIA DUE TO CHRONIC BLOOD LOSS: ICD-10-CM

## 2024-10-14 RX ORDER — PEN NEEDLE, DIABETIC 32GX 5/32"
NEEDLE, DISPOSABLE MISCELLANEOUS
Qty: 200 EACH | Refills: 2 | Status: SHIPPED | OUTPATIENT
Start: 2024-10-14

## 2024-10-14 RX ORDER — LEVOTHYROXINE SODIUM 200 UG/1
200 TABLET ORAL
Qty: 90 TABLET | Refills: 3 | Status: SHIPPED | OUTPATIENT
Start: 2024-10-14

## 2024-10-14 NOTE — PROGRESS NOTES
House Calls CM follow up in home visit. Home alone, sitting at table. Reported vision is slightly improved. Believed she had taken her medications most days; however, had taken only 5 days in past 2 weeks. Stated that she forgets. Is not checking BG consistently. Reports taking Lantus in the evening, but is not using sliding scale for Humalog. Agrees that she needs assistance and has limited support from family/friends. Played voicemail message from [employer] regarding scheduling Passport Assessment. Number written in bold on piece of paper due to vision impairment. Patient called during visit and had to leave voicemail. Passport assessment has not been scheduled as of this time. Will follow up in 2 weeks.

## 2024-10-15 RX ORDER — FERROUS GLUCONATE 324(38)MG
1 TABLET ORAL
Qty: 30 TABLET | Refills: 3 | Status: SHIPPED | OUTPATIENT
Start: 2024-10-15

## 2024-10-24 ENCOUNTER — DOCUMENTATION (OUTPATIENT)
Dept: TRANSPLANT | Facility: HOSPITAL | Age: 69
End: 2024-10-24
Payer: MEDICARE

## 2024-10-24 NOTE — PROGRESS NOTES
Kidney Patient Summary    Date of appointment: 10/24/2024    Name: Daphne Morris    : 1955    MRN: [id number]    Diagnosis: right kidney cancer, on dialysis x 1 year, CVA    ABO: B     Phase: Referral  Status: Active    Center Waitlist Date:     Psych:sadness, hopelessness, insomnia, sleeping too little, social isolation/withdrawal.   Anxiety symptoms: hypervigilance, fearfulness, worry, sleep problems, irritable, racing thoughts, intrusive thoughts, Insomnia. hallucinations.   Last Seen:   Referring Nephrologist:   Dr Mitchell    HD Unit: [facility]   Dialysis Start:   Access:       Days:  TTS    PCP: CARLENE King-CNP       Endocrinologist:     BMI Readings from Last 1 Encounters:   24 24.48 kg/m²     Blood Transfusion:    Medical History/Hospitalizations:   Past Medical History:   Diagnosis Date    Anal fissure 10/12/2023    Anemia     ASHD (arteriosclerotic heart disease)     At risk for falls     Atrial fibrillation (Multi)     CHF (congestive heart failure) (Multi)     CKD (chronic kidney disease)     COPD (chronic obstructive pulmonary disease) (Multi)     CVA (cerebral vascular accident) (Multi)     - right-sided weakness    Depression     Diabetes mellitus (Multi)     GERD (gastroesophageal reflux disease)     H/O pleural effusion     Hyperlipidemia     Hypertension     Hypothyroidism     Hypoxia     Impacted cerumen, left ear     Impacted cerumen of left ear    Noncompliance     Osteoarthritis     Perianal dermatitis 10/12/2023    Personal history of other diseases of the respiratory system     History of acute bronchitis    PVD (peripheral vascular disease) (CMS-HCC)     Renal carcinoma, right (Multi)     s/p partial nephrectomy 2021    Respiratory failure (Multi)     TIA (transient ischemic attack)     Vasculitis (CMS-HCC) 10/12/2023    Weakness        Surgical History:   Past Surgical History:   Procedure Laterality Date    ANKLE SURGERY       2013    CARDIAC CATHETERIZATION N/A 2024    Procedure: Right Heart Cath;  Surgeon: Nicholas Antonio MD;  Location: [facility];  Service: Cardiovascular;  Laterality: N/A;    CATARACT EXTRACTION Right     2019     SECTION, CLASSIC      MR CHEST ANGIO W AND WO IV CONTRAST  2023    MR CHEST ANGIO W AND WO IV CONTRAST 2023 [facility]    MR HEAD ANGIO WO IV CONTRAST  2021    MR HEAD ANGIO WO IV CONTRAST [facility]    NEPHRECTOMY  2021    SHOULDER SURGERY           Donors:     Staff:  Nephrologist:    Surgeon:     :      Testing Date:     Serologies:    CMV:     No results found for requested labs within last 365 days.  EBV Panel:  DINAH-SARGENT VCA IGG:   No results found for requested labs within last 365 days.   DINAH-SARGENT VCA IGM: No results found for requested labs within last 365 days.   EBV EARLY ANTIGEN ANTIBODY, IGG: No results found for requested labs within last 365 days.   EPSTIEN-BARR NUCLEAR ANTIGEN AB: No results found for requested labs within last 365 days.   Tox:    AMPHETAMINE SCREEN BLOOD: No results found for requested labs within last 365 days.   METHAMPHETAMINE, BLOOD, SCREEN: No results found for requested labs within last 365 days.   BENZODIAZEPINES SCREEN BLOOD: No results found for requested labs within last 365 days.   BUPRENORPHINE SCREEN BLOOD: No results found for requested labs within last 365 days.   CANNABINOIDS SCREEN BLOOD: No results found for requested labs within last 365 days.   COCAINE SCREEN BLOOD: No results found for requested labs within last 365 days.   METHADONE SCREEN BLOOD: No results found for requested labs within last 365 days.   OXYCODONE SCREEN BLOOD: No results found for requested labs within last 365 days.   PHENCYCLIDINE SCREEN BLOOD: No results found for requested labs within last 365 days.   OPIATE SCREEN BLOOD: No results found for requested labs within last 365 days.   DRUG  SCREEN COMMENT BLOOD: No results found for requested labs within last 365 days.   BARBITURATE SCREEN BLOOD: No results found for requested labs within last 365 days.   Cotinine: No results found for requested labs within last 365 days.  HBV ag:   Nonreactive  HBV ab:   <3.1  HBV c:     No results found for requested labs within last 365 days.  HCV:        Nonreactive  HIV:    No results found for requested labs within last 365 days.  RPR:    No results found for requested labs within last 365 days.  TSpot:   No results found for requested labs within last 365 days.   VZV:   VARICELLA ZOSTER IGG: No results found for requested labs within last 365 days.   VARICELLA ZOSTER IGG INDEX: No results found for requested labs within last 365 days.   A1C:       HEMOGLOBIN A1C/HEMOGLOBIN TOTAL IN BLOOD: 7.7   ESTIMATED AVERAGE GLUCOSE FOR HBA1C: 174   CPep:  No results found for requested labs within last 365 days.  PSA:    No results found for requested labs within last 365 days.  Other:     Test/Consult Impression Next Scheduled Date   CXR XR chest 2 views    Result Date: 4/29/2024  Cardiomegaly with mild pulmonary vascular congestion. Attention on continued follow-up is advised.   Small right pleural effusion and atelectasis, slightly improved from prior exam. Superimposed infection not excluded. Correlate clinically.   MACRO: None   Signed by: Rebeka James 4/29/2024 6:19 PM Dictation workstation:   [id number]    XR chest 2 views    Result Date: 2/14/2024  Stable moderate to large right pleural effusion.   MACRO None   Signed by: Amando Starks 2/14/2024 9:50 AM Dictation workstation:   [id number]       EKG EKG 12 lead 6/26/2024    Narrative  Normal sinus rhythm  Left axis deviation  Septal infarct , age undetermined  Nonspecific intraventricular block LBBB pattern I AVL  Abnormal ECG  Confirmed by Gerard Luna ([id number]) on 6/26/2024 8:49:16 PM      Echo No results found.     CT Cardiac Score No results found.     NM  Stress Test No results found.     Cardiac MRI No results found.     Left Heart Catheterization No results found.     Cardiology last visit impression      Pulmonary Function Test No results found for this or any previous visit.    CT Abd/Pelvis No results found.     Colonoscopy    Orders placed or performed in visit on 08/26/15    COLONOSCOPY    COLONOSCOPY       Pap 2/2/2024    A. PLEURAL FLUID, RIGHT SIDE, CYTOLOGY AND CELL BLOCK:      No malignant cells identified.    Comment:  Given the patient history of Renal Cell Carcinoma, PAX-8 and   CAIX IHC stains were performed on cell block. PAX-8 is negative and CAIX   shows nonspecific staining (weaker than positive control) of background   mesothelial cells. All IHC controls are satisfactory. These results   support the interpretation.  Further clinical and radiologic correlation is recommended.         Mammogram No results found.     Other:

## 2024-10-28 ENCOUNTER — DOCUMENTATION (OUTPATIENT)
Dept: PRIMARY CARE | Facility: CLINIC | Age: 69
End: 2024-10-28
Payer: MEDICARE

## 2024-11-08 ENCOUNTER — TELEPHONE (OUTPATIENT)
Dept: PRIMARY CARE | Facility: CLINIC | Age: 69
End: 2024-11-08
Payer: MEDICARE

## 2024-11-08 DIAGNOSIS — E78.2 MIXED HYPERLIPIDEMIA: ICD-10-CM

## 2024-11-08 RX ORDER — ATORVASTATIN CALCIUM 40 MG/1
40 TABLET, FILM COATED ORAL DAILY
Qty: 90 TABLET | Refills: 3 | Status: SHIPPED | OUTPATIENT
Start: 2024-11-08

## 2024-11-11 ENCOUNTER — OFFICE VISIT (OUTPATIENT)
Dept: PRIMARY CARE | Facility: CLINIC | Age: 69
End: 2024-11-11
Payer: MEDICARE

## 2024-11-11 ENCOUNTER — DOCUMENTATION (OUTPATIENT)
Dept: PRIMARY CARE | Facility: CLINIC | Age: 69
End: 2024-11-11
Payer: MEDICARE

## 2024-11-11 ENCOUNTER — DOCUMENTATION (OUTPATIENT)
Dept: TRANSPLANT | Facility: HOSPITAL | Age: 69
End: 2024-11-11
Payer: MEDICARE

## 2024-11-11 VITALS
WEIGHT: 154 LBS | BODY MASS INDEX: 24.17 KG/M2 | TEMPERATURE: 97.3 F | HEART RATE: 71 BPM | DIASTOLIC BLOOD PRESSURE: 64 MMHG | SYSTOLIC BLOOD PRESSURE: 118 MMHG | OXYGEN SATURATION: 99 % | HEIGHT: 67 IN | RESPIRATION RATE: 18 BRPM

## 2024-11-11 DIAGNOSIS — I10 PRIMARY HYPERTENSION: ICD-10-CM

## 2024-11-11 DIAGNOSIS — F41.8 ANXIETY WITH DEPRESSION: ICD-10-CM

## 2024-11-11 DIAGNOSIS — Z91.199 MEDICAL NON-COMPLIANCE: ICD-10-CM

## 2024-11-11 DIAGNOSIS — Z79.4 TYPE 2 DIABETES MELLITUS WITH HYPERGLYCEMIA, WITH LONG-TERM CURRENT USE OF INSULIN: Primary | ICD-10-CM

## 2024-11-11 DIAGNOSIS — N18.6 CKD (CHRONIC KIDNEY DISEASE) STAGE V REQUIRING CHRONIC DIALYSIS (MULTI): ICD-10-CM

## 2024-11-11 DIAGNOSIS — E11.65 TYPE 2 DIABETES MELLITUS WITH HYPERGLYCEMIA, WITH LONG-TERM CURRENT USE OF INSULIN: Primary | ICD-10-CM

## 2024-11-11 DIAGNOSIS — R26.2 TROUBLE WALKING: ICD-10-CM

## 2024-11-11 DIAGNOSIS — Z99.2 CKD (CHRONIC KIDNEY DISEASE) STAGE V REQUIRING CHRONIC DIALYSIS (MULTI): ICD-10-CM

## 2024-11-11 DIAGNOSIS — I50.32 CHRONIC DIASTOLIC HEART FAILURE: ICD-10-CM

## 2024-11-11 DIAGNOSIS — I48.0 PAROXYSMAL ATRIAL FIBRILLATION (MULTI): ICD-10-CM

## 2024-11-11 PROCEDURE — 1123F ACP DISCUSS/DSCN MKR DOCD: CPT | Performed by: NURSE PRACTITIONER

## 2024-11-11 PROCEDURE — 1159F MED LIST DOCD IN RCRD: CPT | Performed by: NURSE PRACTITIONER

## 2024-11-11 PROCEDURE — 1157F ADVNC CARE PLAN IN RCRD: CPT | Performed by: NURSE PRACTITIONER

## 2024-11-11 PROCEDURE — 3008F BODY MASS INDEX DOCD: CPT | Performed by: NURSE PRACTITIONER

## 2024-11-11 PROCEDURE — 1126F AMNT PAIN NOTED NONE PRSNT: CPT | Performed by: NURSE PRACTITIONER

## 2024-11-11 PROCEDURE — 99349 HOME/RES VST EST MOD MDM 40: CPT | Performed by: NURSE PRACTITIONER

## 2024-11-11 PROCEDURE — 3078F DIAST BP <80 MM HG: CPT | Performed by: NURSE PRACTITIONER

## 2024-11-11 PROCEDURE — 1160F RVW MEDS BY RX/DR IN RCRD: CPT | Performed by: NURSE PRACTITIONER

## 2024-11-11 PROCEDURE — 3062F POS MACROALBUMINURIA REV: CPT | Performed by: NURSE PRACTITIONER

## 2024-11-11 PROCEDURE — 3074F SYST BP LT 130 MM HG: CPT | Performed by: NURSE PRACTITIONER

## 2024-11-11 RX ORDER — BACITRACIN ZINC AND POLYMYXIN B SULFATE 500; 10000 [USP'U]/G; [USP'U]/G
OINTMENT OPHTHALMIC
COMMUNITY
Start: 2024-09-13

## 2024-11-11 ASSESSMENT — PAIN SCALES - GENERAL: PAINLEVEL_OUTOF10: 0-NO PAIN

## 2024-11-11 NOTE — PROGRESS NOTES
"Subjective   Patient ID: Daphne Morris is a 69 y.o. female who presents for Follow-up (Routine follow up, multiple medical issues ).    Visit for 68 y/o female seen today in private home, alone for routine follow up of multiple medical issues. Pt was initially lying in her bed upon provider arrival. She awoke without difficulty and used her Rolator to ambulate into the living room. Pt is sitting on dining room chair for exam. She is alert, able to answer most questions regarding her health but is an overall poor historian. Pt lives in a mobile home, alone. Her house is very unkept. There are boxes piled up, paperwork, garbage throughout kitchen and living room. The counter tops are covered with dirty dishes, take out containers. Pt is unable to clean at all. She continues to have vision concerns, mostly right eye and had 2 recent surgeries for retinal detachment, most recently on 9/9/24. Pt has a vehicle but is unable to drive due to vision loss and overall decline in her health. She was scheduled for ophthalmology follow up last week and missed her appointment as she states \"my nephew overslept\". She relies on her nephew to provide transportation but he is unreliable. Pt was taking [place] to medical appointments within the [facility] and to dialysis on T/T/S but tells me that she had to pay a small fee so she has since stopped using them. Pt continues to be very inconsistent with her medications. She is followed by house calls BRIE Leyva who fills patients pill box every 2 weeks. Pt has only taken her morning medications 5/14 days and her evening medications 2/14 days. Pt states\" I thought I was taking them\". She has not been using her eye drops and does not take her insulin as she states \"I can't squeeze the bottles or see the numbers for my insulin\". Pt has a freestyle vicente. She was seen by Autumn in home this afternoon. Glucose sensor was not working. Meter not charged. Last checked BG level on 10/31. She " "continues to have very poor diet habits. Unable to cook healthy diabetic meals. Pt feels very depressed about her health and the way she is living. She is followed by [facility] and is scheduled to see Liz Neff this week. Patients  from [facility] did take her to see the [facility] as patient is aware she needs help caring for herself but she reports being unhappy with the facility as it is \"a long walk to her room\" and \"they want 1000 dollar deposit for me to keep Maddie\". Pt reports that she is not going anywhere without her cat as \"that is the only thing I have left\". Pt reports that she is going to be evaluated for Passport services this week and is hoping she qualifies so she can remain in the home setting. Pt is ambulatory with [facility]. She denies any falls since last home follow up. Pt believes her last fall was at the end of September. She denies pain or discomfort. Pt has ESRD. Continues on dialysis and they are using her right arm fistula now. Right chest cath recently removed. No signs of infection. Pt with HTN, Afib, CHF. Cardiologist is Dr. Peterson. Pt has 2D echo scheduled 11/25 and will see cardiologist on 12/8.      Home Visit:          Medically necessary due to: Illness or condition that results in activity lmitation or restriction that impacts the ability to leave home such as:, unsteady gait/poor balance, shortness of breath with exertion         Current Outpatient Medications:     bacitracin-polymyxin B (Polysporin) ophthalmic ointment, Apply to affected eye(s)., Disp: , Rfl:     acetaminophen (Tylenol) 500 mg tablet, Take 2 tablets (1,000 mg) by mouth 2 times a day., Disp: , Rfl:     albuterol 90 mcg/actuation inhaler, INHALE 2 PUFFS BY MOUTH EVERY 4 HOURS AS NEEDED, Disp: 8.5 g, Rfl: 0    amiodarone (Pacerone) 200 mg tablet, Take 1 tablet (200 mg) by mouth once daily with breakfast., Disp: 90 tablet, Rfl: 1    amLODIPine (Norvasc) 5 mg tablet, Take 1 tablet " "(5 mg) by mouth once daily., Disp: , Rfl:     aspirin 81 mg EC tablet, Take 1 tablet (81 mg) by mouth once daily., Disp: , Rfl:     atorvastatin (Lipitor) 40 mg tablet, TAKE ONE TABLET BY MOUTH EVERY DAY, Disp: 90 tablet, Rfl: 3    BD Calista 2nd Gen Pen Needle 32 gauge x 5/32\" needle, USE SUBCUTANEOUSLY AS DIRECTED TWICE DAILY, Disp: 200 each, Rfl: 2    clopidogrel (Plavix) 75 mg tablet, Take 1 tablet (75 mg) by mouth once daily., Disp: , Rfl:     ferrous gluconate 324 (38 Fe) mg tablet, Take 1 tablet (324 mg) by mouth once daily with breakfast., Disp: 30 tablet, Rfl: 3    FreeStyle Shakir 14 Day Sensor kit, USE AS DIRECTED TO CHECK SUGARS 3 TO 4 TIMES DAILY, Disp: 1 each, Rfl: 11    FreeStyle Shakir 2 [place] misc, Use as instructed, Disp: 1 each, Rfl: 0    FreeStyle Shakir 3 [place] misc, Use as instructed, Disp: 1 each, Rfl: 0    FreeStyle Shakir 3 Sensor device, Change sensor Q 14 days, Disp: 2 each, Rfl: 11    HumaLOG KwikPen Insulin 100 unit/mL injection, up to 15 units Subcutaneous three times a day per sliding scaleup to 15 units Subcutaneous three times a day per sliding scale (Patient taking differently: up to 15 units Subcutaneous three times a day per sliding scale.    151-200 2 units 201-250 4 units 251-300 6 units 301-350 8 units 351-400 10 units), Disp: 5 each, Rfl: 3    ipratropium-albuteroL (Duo-Neb) 0.5-2.5 mg/3 mL nebulizer solution, INHALE THE CONTENTS OF 1 VIAL VIA NEBULIZER EVERY 6 HOURS AS NEEDED., Disp: 360 mL, Rfl: 0    Lantus Solostar U-100 Insulin 100 unit/mL (3 mL) pen, Inject 10 Units under the skin once daily at bedtime., Disp: 9 mL, Rfl: 3    levothyroxine (Synthroid, Levoxyl) 200 mcg tablet, Take 1 tablet by mouth once daily in the morning before a meal., Disp: 90 tablet, Rfl: 3    levothyroxine (Synthroid, Levoxyl) 50 mcg tablet, Take 1 tablet (50 mcg) by mouth once daily in the morning. Take before meals., Disp: 90 tablet, Rfl: 3    metoprolol succinate XL (Toprol-XL) 50 mg 24 hr tablet, " "TAKE ONE TABLET BY MOUTH TWO TIMES A DAY, Disp: 180 tablet, Rfl: 0    oxygen DME/Hospice (O2) therapy, Inhale 3 L/min once daily at bedtime. Indications: decreased oxygen in the tissues or blood, Disp: , Rfl:     pantoprazole (ProtoNix) 40 mg EC tablet, Take 1 tablet (40 mg) by mouth 2 times a day., Disp: 60 tablet, Rfl: 6    potassium chloride CR 20 mEq ER tablet, Take 1 tablet (20 mEq) by mouth once daily., Disp: , Rfl:     venlafaxine XR (Effexor-XR) 75 mg 24 hr capsule, Take 2 capsules (150 mg) by mouth once daily., Disp: 180 capsule, Rfl: 3     Review of Systems  Constitutional:  Negative for appetite change, chills and fever, unexplained weight loss.   HENT: Positive for hearing loss. Negative for difficulty swallowing.   Eyes: Positive for poor vision, right eye.   Respiratory: Positive for shortness of breath with exertion, occasional wheezing. Negative for cough.    Cardiovascular: Negative for chest pain, palpitations, edema.   Gastrointestinal: Positive for intermittent nausea. Negative for abdominal pain, constipation, diarrhea.   Endocrine: Positive for diabetes.   Genitourinary:  Negative for dysuria.   Musculoskeletal:  Positive for arthralgias, unsteady gait.   Neurological: Positive for weakness. Negative for dizziness, lightheadedness.   Skin: Negative for wound.   Psychiatric/Behavioral: Positive for anxiety, depression, difficulty sleeping    Objective   /64 (BP Location: Left arm, Patient Position: Sitting, BP Cuff Size: Adult)   Pulse 71   Temp 36.3 °C (97.3 °F) (Temporal)   Resp 18   Ht 1.689 m (5' 6.5\")   Wt 69.9 kg (154 lb)   SpO2 99%   BMI 24.48 kg/m²     Physical Exam  Constitutional:       General: She is not in acute distress.     Appearance: Alert. Disheveled. Chronically ill. She is not toxic-appearing.   HENT:      Head: Normocephalic and atraumatic.      Nose: Nose normal.      Mouth/Throat:      Mouth: Mucous membranes are moist.      Pharynx: Oropharynx is " clear.  Cardiovascular:      Rate and Rhythm: Normal rate and regular rhythm.      Pulses: Normal pulses.      Heart sounds: Normal heart sounds.      No edema.   Pulmonary:      Effort: Pulmonary effort is normal.      Breath sounds: Lungs clear. No wheezing, rales or rhonchi  Abdominal:      General: Bowel sounds are normal.      Palpations: Abdomen is soft.   Musculoskeletal:         General: Normal range of motion. No spinal tenderness on exam.   Skin:     General: Skin is warm and dry.      Coloration: Skin is pale  Neurological:      General: No focal deficit present.      Mental Status: She is alert and oriented to person, place, and time.      Gait unsteady, uses Rolator   Psychiatric:         Mood and Affect: Depressed.        Behavior: Behavior normal.      Poor insight and judgement    Assessment/Plan   Diagnoses and all orders for this visit:  Type 2 diabetes mellitus with hyperglycemia, with long-term current use of insulin  -chronic, poorly controlled   -not checking glucose levels or taking insulin   -we discussed importance of med compliance, taking all medications as prescribed and making healthier diet choices     CKD (chronic kidney disease) stage V requiring chronic dialysis (Multi)  -chronic, follows with nephrology  -continue dialysis T/T/S  -encouraged compliance of all dialysis treatments    Primary hypertension  -chronic, vitals stable  -continue Amlodipine, aspirin    Paroxysmal atrial fibrillation (Multi)  -chronic, stable, HR regular, rate controlled   -continue metoprolol, aspirin, amiodarone     Chronic diastolic heart failure  -chronic, stable, euvolemic on exam   -recommend monitoring daily weight     Medical non-compliance  -chronic, continues to be a concern but worsening   -multiple people involved in patients care including house calls NP, house calls CM [facility] NP Liz Neff, counselor lj bravo and  Sarah.   -[facility] CM and counselor  working on AL options with pt     Trouble walking  -chronic, ambulatory with walker   -last reported fall was 9/22    Anxiety with depression  -chronic, pt with depression but overall stable  -active with [facility], [place] NP, counselor and   -continue Venlafaxine,     Pt continues to live alone. She would benefit from living in Assisted Living environment as she has much difficulty caring for herself, remembering to take her medications and does not have reliable transportation to get to medical appointments. I advised pt to contact house calls office with any acute concerns or medication needs. House calls BRIE Leyva to continue following patient and assisting with her medications every 2 weeks.        Poly Diaz, APRN-CNP

## 2024-11-11 NOTE — PROGRESS NOTES
House Calls CM follow up in home visit. Sitting at table upon arrival. Stated she had taken meds this morning. Pill boxes observed. Took 5 days of morning meds and 2 days of evening meds over past two weeks. Stated she is having a hard time keeping track of things. Has Freestyle Shakir on left arm that is inactive. Per reader, last BG check was 10/31. Stated she is trying to take her insulin every evening; however, is difficult due to vision impairment. Reported she missed her follow up retinal specialist appointment due to transportation. Is not sure if it is rescheduled. Attempted to call office during visit; however, they were at lunch. Has cardiology follow up with echo on 11/25. Discussed visit to [facility]. Stated the room was too far from the door and she does not have $1000 required for her to have her cat with her. Stated she would rather be in her home. Discussed lack of support and inability to care for herself. Is unaware if a Passport Assessment has been completed or scheduled. Provider updated. Will follow up in 2 weeks and reach out to staff at [facility].

## 2024-11-11 NOTE — PROGRESS NOTES
TRANSPLANT REFERRAL REVIEW NOTE    Date: 11/11/2024  Time: 12:53 PM    Daphne Morris  was referred by: No referring provider defined for this encounter.     Nephrologist: Dr Mitchell   Dialysis unit: [facility]     This kidney transplant referral was reviewed by:    Surgeon: DR. ANSON KENDALL  Nephrologist: N/A    The recommendation is to refer patient to HIGH RISK kidney transplant evaluation clinic due to 3L nightly Oxygen noted in 5/2024 notes, also required thoracentesis - unclear if still requiring these    Reason referral closure: NA    Task was sent to  to scheduled or close referral as above : YES

## 2024-11-11 NOTE — Clinical Note
Pt will need a 6 week follow up with Kellie. Please arrange for Autumn to be there as well. Thank you.

## 2024-11-15 ENCOUNTER — OFFICE VISIT (OUTPATIENT)
Dept: TRANSPLANT | Facility: HOSPITAL | Age: 69
End: 2024-11-15
Payer: MEDICARE

## 2024-11-15 DIAGNOSIS — N18.6 ESRD (END STAGE RENAL DISEASE) (MULTI): Primary | ICD-10-CM

## 2024-11-15 PROCEDURE — 99215 OFFICE O/P EST HI 40 MIN: CPT | Performed by: TRANSPLANT SURGERY

## 2024-11-15 PROCEDURE — 1123F ACP DISCUSS/DSCN MKR DOCD: CPT | Performed by: TRANSPLANT SURGERY

## 2024-11-15 PROCEDURE — 99205 OFFICE O/P NEW HI 60 MIN: CPT | Performed by: TRANSPLANT SURGERY

## 2024-11-15 PROCEDURE — 3062F POS MACROALBUMINURIA REV: CPT | Performed by: TRANSPLANT SURGERY

## 2024-11-15 PROCEDURE — 1157F ADVNC CARE PLAN IN RCRD: CPT | Performed by: TRANSPLANT SURGERY

## 2024-11-15 ASSESSMENT — ENCOUNTER SYMPTOMS
DIARRHEA: 0
ADENOPATHY: 0
FEVER: 0
HALLUCINATIONS: 0
COLOR CHANGE: 0
AGITATION: 0
DIZZINESS: 0
SHORTNESS OF BREATH: 0
CONSTIPATION: 0
COUGH: 0
CONFUSION: 0
HEMATURIA: 0
ABDOMINAL PAIN: 0
ABDOMINAL DISTENTION: 0
WEAKNESS: 0
LIGHT-HEADEDNESS: 0
DYSURIA: 0
CHILLS: 0
ARTHRALGIAS: 0
FREQUENCY: 0
EYES NEGATIVE: 1

## 2024-11-15 NOTE — PROGRESS NOTES
Subjective   Daphne Morris is a 69 y.o. female who presents for kidney transplant evaluation visit.     ESRD due to HTN and DM and she came in a wheel chair  She reports can walk 1 block with cane but loses balance often  She has had CVA x 3, most recently one was early 2024.    She cannot hear very well or see very well after the stroke.  Difficulty using her hands      Review of Systems   Constitutional:  Negative for chills and fever.   HENT: Negative.  Negative for congestion.    Eyes: Negative.    Respiratory:  Negative for cough and shortness of breath.    Cardiovascular:  Negative for chest pain.   Gastrointestinal:  Negative for abdominal distention, abdominal pain, constipation and diarrhea.   Endocrine: Negative for cold intolerance and heat intolerance.   Genitourinary:  Negative for dysuria, frequency, hematuria and urgency.   Musculoskeletal:  Negative for arthralgias.   Skin:  Negative for color change.   Allergic/Immunologic: Negative for environmental allergies.   Neurological:  Negative for dizziness, weakness and light-headedness.   Hematological:  Negative for adenopathy.   Psychiatric/Behavioral:  Negative for agitation, confusion and hallucinations.         Objective   There were no vitals filed for this visit.    Physical Exam  Constitutional:       Comments: frail   HENT:      Head: Normocephalic and atraumatic.      Nose: Nose normal.   Eyes:      Pupils: Pupils are equal, round, and reactive to light.   Cardiovascular:      Rate and Rhythm: Normal rate.   Pulmonary:      Effort: Pulmonary effort is normal. No respiratory distress.   Abdominal:      General: There is no distension.      Palpations: Abdomen is soft. There is no mass.   Musculoskeletal:         General: No swelling. Normal range of motion.      Cervical back: Normal range of motion.   Skin:     General: Skin is warm and dry.   Neurological:      Mental Status: She is alert and oriented to person, place, and time.       "Comments: Mild facial droop   Psychiatric:         Mood and Affect: Mood normal.         Behavior: Behavior normal.          Lab Review    Blood Type: No results found for: \"ABORH\"  Lab Results   Component Value Date    CREATININE 2.90 (H) 06/27/2024    K 5.1 06/27/2024    GLUCOSE 235 (H) 06/27/2024    HCT 39.1 06/27/2024    WBC 9.5 06/27/2024     06/27/2024    CALCIUM 7.9 (L) 06/27/2024     Lab Results   Component Value Date    WBC 9.5 06/27/2024    HGB 11.6 (L) 06/27/2024    HCT 39.1 06/27/2024    MCV 92 06/27/2024     06/27/2024     Lab Results   Component Value Date    GLUCOSE 235 (H) 06/27/2024    CALCIUM 7.9 (L) 06/27/2024     06/27/2024    K 5.1 06/27/2024    CO2 24 06/27/2024     06/27/2024    BUN 32 (H) 06/27/2024    CREATININE 2.90 (H) 06/27/2024       Current Outpatient Medications:     acetaminophen (Tylenol) 500 mg tablet, Take 2 tablets (1,000 mg) by mouth 2 times a day., Disp: , Rfl:     albuterol 90 mcg/actuation inhaler, INHALE 2 PUFFS BY MOUTH EVERY 4 HOURS AS NEEDED, Disp: 8.5 g, Rfl: 0    amiodarone (Pacerone) 200 mg tablet, Take 1 tablet (200 mg) by mouth once daily with breakfast., Disp: 90 tablet, Rfl: 1    amLODIPine (Norvasc) 5 mg tablet, Take 1 tablet (5 mg) by mouth once daily., Disp: , Rfl:     aspirin 81 mg EC tablet, Take 1 tablet (81 mg) by mouth once daily., Disp: , Rfl:     atorvastatin (Lipitor) 40 mg tablet, TAKE ONE TABLET BY MOUTH EVERY DAY, Disp: 90 tablet, Rfl: 3    bacitracin-polymyxin B (Polysporin) ophthalmic ointment, Apply to affected eye(s)., Disp: , Rfl:     BD Calista 2nd Gen Pen Needle 32 gauge x 5/32\" needle, USE SUBCUTANEOUSLY AS DIRECTED TWICE DAILY, Disp: 200 each, Rfl: 2    clopidogrel (Plavix) 75 mg tablet, Take 1 tablet (75 mg) by mouth once daily., Disp: , Rfl:     ferrous gluconate 324 (38 Fe) mg tablet, Take 1 tablet (324 mg) by mouth once daily with breakfast., Disp: 30 tablet, Rfl: 3    FreeStyle Shakir 14 Day Sensor kit, USE AS " DIRECTED TO CHECK SUGARS 3 TO 4 TIMES DAILY, Disp: 1 each, Rfl: 11    FreeStyle Shakir 2 [place] misc, Use as instructed, Disp: 1 each, Rfl: 0    FreeStyle Shakir 3 [place] misc, Use as instructed, Disp: 1 each, Rfl: 0    FreeStyle Shakir 3 Sensor device, Change sensor Q 14 days, Disp: 2 each, Rfl: 11    HumaLOG KwikPen Insulin 100 unit/mL injection, up to 15 units Subcutaneous three times a day per sliding scaleup to 15 units Subcutaneous three times a day per sliding scale (Patient taking differently: up to 15 units Subcutaneous three times a day per sliding scale.    151-200 2 units 201-250 4 units 251-300 6 units 301-350 8 units 351-400 10 units), Disp: 5 each, Rfl: 3    ipratropium-albuteroL (Duo-Neb) 0.5-2.5 mg/3 mL nebulizer solution, INHALE THE CONTENTS OF 1 VIAL VIA NEBULIZER EVERY 6 HOURS AS NEEDED., Disp: 360 mL, Rfl: 0    Lantus Solostar U-100 Insulin 100 unit/mL (3 mL) pen, Inject 10 Units under the skin once daily at bedtime., Disp: 9 mL, Rfl: 3    levothyroxine (Synthroid, Levoxyl) 200 mcg tablet, Take 1 tablet by mouth once daily in the morning before a meal., Disp: 90 tablet, Rfl: 3    levothyroxine (Synthroid, Levoxyl) 50 mcg tablet, Take 1 tablet (50 mcg) by mouth once daily in the morning. Take before meals., Disp: 90 tablet, Rfl: 3    metoprolol succinate XL (Toprol-XL) 50 mg 24 hr tablet, TAKE ONE TABLET BY MOUTH TWO TIMES A DAY, Disp: 180 tablet, Rfl: 0    oxygen DME/Hospice (O2) therapy, Inhale 3 L/min once daily at bedtime. Indications: decreased oxygen in the tissues or blood, Disp: , Rfl:     pantoprazole (ProtoNix) 40 mg EC tablet, Take 1 tablet (40 mg) by mouth 2 times a day., Disp: 60 tablet, Rfl: 6    potassium chloride CR 20 mEq ER tablet, Take 1 tablet (20 mEq) by mouth once daily., Disp: , Rfl:     venlafaxine XR (Effexor-XR) 75 mg 24 hr capsule, Take 2 capsules (150 mg) by mouth once daily., Disp: 180 capsule, Rfl: 3        Assessment/Plan    Diagnoses:   1. ESRD (end stage renal  disease) (Multi)      Daphne Morris is a NOT a candidate for kidney transplant.  Frail and and poor functional status, recent CVA earlier this year  She can barely get up from wheel chair and cannot take a step without losing her balance  I had a very honest discussion with her and she understands she will not do well with transplant

## 2024-11-19 ENCOUNTER — TELEPHONE (OUTPATIENT)
Dept: PRIMARY CARE | Facility: CLINIC | Age: 69
End: 2024-11-19
Payer: MEDICARE

## 2024-11-26 ENCOUNTER — DOCUMENTATION (OUTPATIENT)
Dept: PRIMARY CARE | Facility: CLINIC | Age: 69
End: 2024-11-26
Payer: MEDICARE

## 2024-12-07 DIAGNOSIS — I48.0 PAROXYSMAL ATRIAL FIBRILLATION (MULTI): ICD-10-CM

## 2024-12-09 ENCOUNTER — DOCUMENTATION (OUTPATIENT)
Dept: PRIMARY CARE | Facility: CLINIC | Age: 69
End: 2024-12-09
Payer: MEDICARE

## 2024-12-09 RX ORDER — AMIODARONE HYDROCHLORIDE 200 MG/1
200 TABLET ORAL
Qty: 90 TABLET | Refills: 0 | Status: SHIPPED | OUTPATIENT
Start: 2024-12-09

## 2024-12-09 NOTE — PROGRESS NOTES
House Calls CM follow up in home visit. Home alone sitting at table. Home remains cluttered and unkempt. Stated she is scheduled for repeat retinal surgery on 12/23. She is not checking her BG or taking insulin due to visual impairment. Took 6 days of medications over past 2 weeks. States she thinks she is taking them. Missed dialysis last week due to weather. Reported plan is for her to move into Al at [facility] at beginning of year as she acknowledges inability to care for herself. Will follow up 12/20/24.

## 2024-12-13 ENCOUNTER — TELEPHONE (OUTPATIENT)
Dept: PRIMARY CARE | Facility: CLINIC | Age: 69
End: 2024-12-13
Payer: MEDICARE

## 2024-12-13 NOTE — TELEPHONE ENCOUNTER
ARMIDA Vallejo from [facility] phoned and reported she has been attempting to contact patient via phone without success, she also mailed her a letter with no response. Kellie reported she attempted to contact patient via her home number, Kellie given patient's cell phone number. Kellie requested to speak to you in follow up. She reported there is Medicaid assistance wavier for the assisted living facility that  would need to be started and it is a lengthy process.

## 2024-12-14 ENCOUNTER — HOSPITAL ENCOUNTER (OUTPATIENT)
Dept: RADIOLOGY | Facility: HOSPITAL | Age: 69
Discharge: HOME | End: 2024-12-14
Payer: MEDICARE

## 2024-12-14 DIAGNOSIS — K21.9 GASTROESOPHAGEAL REFLUX DISEASE WITHOUT ESOPHAGITIS: ICD-10-CM

## 2024-12-14 DIAGNOSIS — J90 PLEURAL EFFUSION: ICD-10-CM

## 2024-12-14 DIAGNOSIS — R06.02 SOB (SHORTNESS OF BREATH): ICD-10-CM

## 2024-12-14 PROCEDURE — 71046 X-RAY EXAM CHEST 2 VIEWS: CPT | Performed by: RADIOLOGY

## 2024-12-14 PROCEDURE — 71046 X-RAY EXAM CHEST 2 VIEWS: CPT

## 2024-12-16 DIAGNOSIS — J90 PLEURAL EFFUSION: Primary | ICD-10-CM

## 2024-12-16 RX ORDER — PANTOPRAZOLE SODIUM 40 MG/1
40 TABLET, DELAYED RELEASE ORAL 2 TIMES DAILY
Qty: 60 TABLET | Refills: 0 | Status: SHIPPED | OUTPATIENT
Start: 2024-12-16

## 2024-12-16 NOTE — TELEPHONE ENCOUNTER
Spoke with patient. Was unaware of [facility] attempting to contact her regarding assessment. Previously had home care assessment for [employer]. Is unable to transcribe phone number or place call due to vision impairment. Gave okay to give [facility]  her nephew Silver Lima's phone number. Spoke with Kellie Vallejo. Stated she made several calls as well as a visit but unable to reach patient. Updated Kellie on dialysis as well as vision impairment. Nephew's phone number provided. Per Kellie, patient will need a new assessment for AL waiver. She will reach out to her supervisor to expedite as well as to gale Sheppard to update.

## 2024-12-17 ENCOUNTER — TELEPHONE (OUTPATIENT)
Dept: PRIMARY CARE | Facility: CLINIC | Age: 69
End: 2024-12-17
Payer: MEDICARE

## 2024-12-17 NOTE — TELEPHONE ENCOUNTER
Received faxed document asking for medical clearance for cataract sx. Patient has not been seen by provider CARMEN Diaz NP since 11/2024.  Provider now on Medical leave until Mid February.  Patient due to be seen by Provider DANA Gilbert NP 1/2025.  AT last visit with CARMEN Diaz NP upcoming sx. Is noted, no clearance noted.  This nurse speaking with Lilly at [facility] letting her know we are unable to provide medical clearance as patient has not been seen in greater than 30 days.  Patient is delicate in health, receives dialysis three days per week, has recently seen MD at [facility] for transplant clearance which was not approved.  Patient has hx. Of non-compliance with medications and treatments.  Name of provider at [facility] given to [facility] as they last saw patient to request medical clearance for cataract sx.

## 2024-12-20 ENCOUNTER — APPOINTMENT (OUTPATIENT)
Dept: RADIOLOGY | Facility: HOSPITAL | Age: 69
End: 2024-12-20
Payer: MEDICARE

## 2024-12-20 ENCOUNTER — HOSPITAL ENCOUNTER (EMERGENCY)
Facility: HOSPITAL | Age: 69
Discharge: HOME | End: 2024-12-20
Attending: EMERGENCY MEDICINE
Payer: MEDICARE

## 2024-12-20 ENCOUNTER — TELEPHONE (OUTPATIENT)
Dept: PRIMARY CARE | Facility: CLINIC | Age: 69
End: 2024-12-20
Payer: MEDICARE

## 2024-12-20 VITALS
WEIGHT: 154 LBS | HEART RATE: 79 BPM | SYSTOLIC BLOOD PRESSURE: 195 MMHG | RESPIRATION RATE: 16 BRPM | HEIGHT: 66 IN | BODY MASS INDEX: 24.75 KG/M2 | TEMPERATURE: 97.1 F | OXYGEN SATURATION: 100 % | DIASTOLIC BLOOD PRESSURE: 101 MMHG
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
## 2024-12-20 DIAGNOSIS — S89.92XA INJURY OF LEFT KNEE, INITIAL ENCOUNTER: ICD-10-CM

## 2024-12-20 DIAGNOSIS — S81.812A SKIN TEAR OF LEFT LOWER LEG WITHOUT COMPLICATION, INITIAL ENCOUNTER: ICD-10-CM

## 2024-12-20 PROCEDURE — 73564 X-RAY EXAM KNEE 4 OR MORE: CPT | Mod: LEFT SIDE | Performed by: RADIOLOGY

## 2024-12-20 PROCEDURE — 99283 EMERGENCY DEPT VISIT LOW MDM: CPT | Performed by: EMERGENCY MEDICINE

## 2024-12-20 PROCEDURE — 73564 X-RAY EXAM KNEE 4 OR MORE: CPT | Mod: LT

## 2024-12-20 ASSESSMENT — COLUMBIA-SUICIDE SEVERITY RATING SCALE - C-SSRS
2. HAVE YOU ACTUALLY HAD ANY THOUGHTS OF KILLING YOURSELF?: NO
6. HAVE YOU EVER DONE ANYTHING, STARTED TO DO ANYTHING, OR PREPARED TO DO ANYTHING TO END YOUR LIFE?: NO
1. IN THE PAST MONTH, HAVE YOU WISHED YOU WERE DEAD OR WISHED YOU COULD GO TO SLEEP AND NOT WAKE UP?: NO

## 2024-12-20 ASSESSMENT — PAIN DESCRIPTION - ORIENTATION: ORIENTATION: LEFT

## 2024-12-20 ASSESSMENT — PAIN - FUNCTIONAL ASSESSMENT: PAIN_FUNCTIONAL_ASSESSMENT: 0-10

## 2024-12-20 ASSESSMENT — PAIN DESCRIPTION - LOCATION: LOCATION: KNEE

## 2024-12-20 ASSESSMENT — PAIN DESCRIPTION - PAIN TYPE: TYPE: ACUTE PAIN

## 2024-12-20 ASSESSMENT — PAIN SCALES - GENERAL: PAINLEVEL_OUTOF10: 3

## 2024-12-20 NOTE — ED PROVIDER NOTES
HPI   Chief Complaint   Patient presents with    Fall     Left knee skin tear.       69-year-old female presents with fall with skin tear left knee.  She did not sleep well last night.  She was sitting at the kitchen table having breakfast.  States that she fell asleep at the kitchen table and woke up on the ground.  She injured her left shin and left knee.  She has been able to walk without difficulty since falling.  History of stroke, COPD, coronary artery disease, hypertension, diabetes, end-stage kidney disease on hemodialysis.  Denies chest pain shortness of breath palpitations headache or other associated symptoms.  Does not take blood thinners.      History provided by:  Patient and medical records          Patient History   Past Medical History:   Diagnosis Date    Anal fissure 10/12/2023    Anemia     ASHD (arteriosclerotic heart disease)     At risk for falls     Atrial fibrillation (Multi)     CHF (congestive heart failure)     CKD (chronic kidney disease)     COPD (chronic obstructive pulmonary disease) (Multi)     CVA (cerebral vascular accident) (Multi)     2021- right-sided weakness    Depression     Diabetes mellitus (Multi)     GERD (gastroesophageal reflux disease)     H/O pleural effusion     Hyperlipidemia     Hypertension     Hypothyroidism     Hypoxia     Impacted cerumen, left ear     Impacted cerumen of left ear    Noncompliance     Osteoarthritis     Perianal dermatitis 10/12/2023    Personal history of other diseases of the respiratory system     History of acute bronchitis    PVD (peripheral vascular disease) (CMS-HCC)     Renal carcinoma, right (Multi)     s/p partial nephrectomy 8/2021    Respiratory failure (Multi)     TIA (transient ischemic attack)     Vasculitis (CMS-HCC) 10/12/2023    Weakness      Past Surgical History:   Procedure Laterality Date    ANKLE SURGERY      2013    CARDIAC CATHETERIZATION N/A 2/7/2024    Procedure: Right Heart Cath;  Surgeon: Nicholas Antonio MD;   Location: [facility] Cardiac Cath Lab;  Service: Cardiovascular;  Laterality: N/A;    CATARACT EXTRACTION Right     2019     SECTION, CLASSIC      MR CHEST ANGIO W AND WO IV CONTRAST  2023    MR CHEST ANGIO W AND WO IV CONTRAST 2023 [facility]    MR HEAD ANGIO WO IV CONTRAST  2021    MR HEAD ANGIO WO IV CONTRAST [facility]    NEPHRECTOMY  2021    SHOULDER SURGERY      2009     Family History   Problem Relation Name Age of Onset    Hypertension Mother      Diabetes Father      Heart disease Father      Cancer Sister      Cancer Brother      Diabetes Daughter      Asthma Daughter      Heart attack Daughter      Diabetes Maternal Grandfather      Heart disease Paternal Grandmother      Diabetes Other      Hypertension Other      COPD Other      Other (chronic lung disease) Other       Social History     Tobacco Use    Smoking status: Every Day     Current packs/day: 0.50     Average packs/day: 0.5 packs/day for 56.0 years (28.0 ttl pk-yrs)     Types: Cigarettes     Start date: 1969     Passive exposure: Never    Smokeless tobacco: Never   Vaping Use    Vaping status: Never Used   Substance Use Topics    Alcohol use: Not Currently    Drug use: Not Currently     Types: Marijuana       Physical Exam   ED Triage Vitals [24 1054]   Temperature Heart Rate Respirations BP   36.4 °C (97.5 °F) 79 17 (!) 182/100      Pulse Ox Temp Source Heart Rate Source Patient Position   98 % Temporal Monitor Sitting      BP Location FiO2 (%)     Left arm --       Physical Exam  Vitals and nursing note reviewed.   Constitutional:       General: She is not in acute distress.     Appearance: She is well-developed.   HENT:      Head: Normocephalic and atraumatic.   Eyes:      Conjunctiva/sclera: Conjunctivae normal.   Cardiovascular:      Rate and Rhythm: Normal rate and regular rhythm.      Heart sounds: No murmur heard.  Pulmonary:      Effort: Pulmonary effort is normal. No respiratory  distress.      Breath sounds: Normal breath sounds.   Abdominal:      Palpations: Abdomen is soft.      Tenderness: There is no abdominal tenderness.   Musculoskeletal:         General: No swelling or deformity.      Cervical back: Neck supple.        Legs:       Comments: 5 x 5 cm square-shaped skin tear left shin.  Bruising and tenderness to left knee.  Otherwise no bony tenderness to left lower extremity.    No bony tenderness to right lower extremity or bilateral upper extremities.  Fistula right upper arm with palpable thrill.  Pelvis stable, secure.    No midline tenderness step-offs or deformities of the entire spinal column.    Chest wall stable, secure.   Skin:     General: Skin is warm and dry.      Capillary Refill: Capillary refill takes less than 2 seconds.   Neurological:      General: No focal deficit present.      Mental Status: She is alert and oriented to person, place, and time.      Cranial Nerves: No cranial nerve deficit.      Motor: No weakness.   Psychiatric:         Mood and Affect: Mood normal.           ED Course & MDM   ED Course as of 12/20/24 1200   Fri Dec 20, 2024   1138 69-year-old female presents with left knee pain and skin tear left lower leg after fall.  X-ray left knee.  Will dress the skin tear with Xeroform, Telfa, Kerlix. [BT]   1150 X-ray left knee negative for fracture.    Wound dressed.    I considered admission.  Patient has steady gait with walker.  She is at her baseline physical and mental status.  She was advised to continue to use her rollator at home.  Advised return with any concerns.  Patient agreeable with plan and verbalized understanding.  Discharged home. [BT]   1200 EKG 12 lead  EKG interpreted by me shows sinus rhythm with left axis deviation.  Rate = 77.  Normal intervals.  No acute injury pattern. [BT]      ED Course User Index  [BT] James Rosas,          Diagnoses as of 12/20/24 1200   Fall, initial encounter   Injury of left knee, initial  encounter   Skin tear of left lower leg without complication, initial encounter     XR knee left 4+ views   Final Result   Moderate arthrosis of the left knee with a joint effusion. No acute   osseous abnormality.        Signed by: Lam Cheatham 12/20/2024 11:40 AM   Dictation workstation:   [id number]                      No data recorded     Lee Coma Scale Score: 15 (12/20/24 1112 : Addie Gómez RN)                           Medical Decision Making      Procedure  Procedures     James Rosas, DO  12/20/24 1151       James Rosas, DO  12/20/24 1200

## 2024-12-20 NOTE — TELEPHONE ENCOUNTER
ARMIDA Hopkins - This is a patient of Poly Diaz NP, you have a visit scheduled 1/15/25. Patient was T&R from [facility] ED today, s/p fall with skin tear LLE. Patient is currently working with [facility] and [facility] for [facility] placement.

## 2024-12-20 NOTE — ED TRIAGE NOTES
Per EMS, pt. Lives at home alone, ambulates with a wheeled walker, has oxygen that she wears at night.  Pt. Was sitting at her kitchen table smoking a cigarette when she fell asleep and fell out of her chair onto the floor.  Pt. Has a skin tear to her left knee.  Pt. Is a dialysis patient T-Th-Sat, has a right arm AV fistula.  Pt. Is scheduled for retina surgery next week and was told to stop her blood thinner, pt. Stopped taking all of her medications d/t see she is unable to see what medication is what.

## 2024-12-20 NOTE — ED NOTES
Spoke to Dr Rosas regarding pts. /101 and pt. Has not taken her home medications today.  Verbal order received, okay to discharge to home and have pt. Take her medications when she gets home.     Addie Gómez RN  12/20/24 1718

## 2024-12-23 ENCOUNTER — HOSPITAL ENCOUNTER (OUTPATIENT)
Dept: CARDIOLOGY | Facility: HOSPITAL | Age: 69
Discharge: HOME | End: 2024-12-23
Payer: MEDICARE

## 2024-12-23 PROCEDURE — 93005 ELECTROCARDIOGRAM TRACING: CPT

## 2024-12-26 ENCOUNTER — APPOINTMENT (OUTPATIENT)
Dept: CARDIOLOGY | Facility: HOSPITAL | Age: 69
End: 2024-12-26
Payer: MEDICARE

## 2024-12-26 ENCOUNTER — HOSPITAL ENCOUNTER (OUTPATIENT)
Facility: HOSPITAL | Age: 69
Setting detail: OBSERVATION
Discharge: HOME | End: 2024-12-28
Attending: INTERNAL MEDICINE | Admitting: INTERNAL MEDICINE
Payer: MEDICARE

## 2024-12-26 ENCOUNTER — HOSPITAL ENCOUNTER (EMERGENCY)
Facility: HOSPITAL | Age: 69
Discharge: OTHER INSTITUTION | End: 2024-12-26
Attending: EMERGENCY MEDICINE
Payer: MEDICARE

## 2024-12-26 ENCOUNTER — APPOINTMENT (OUTPATIENT)
Dept: RADIOLOGY | Facility: HOSPITAL | Age: 69
End: 2024-12-26
Payer: MEDICARE

## 2024-12-26 ENCOUNTER — TELEPHONE (OUTPATIENT)
Dept: PRIMARY CARE | Facility: CLINIC | Age: 69
End: 2024-12-26

## 2024-12-26 VITALS
BODY MASS INDEX: 24.75 KG/M2 | DIASTOLIC BLOOD PRESSURE: 82 MMHG | HEIGHT: 66 IN | SYSTOLIC BLOOD PRESSURE: 178 MMHG | HEART RATE: 66 BPM | TEMPERATURE: 96.8 F | RESPIRATION RATE: 20 BRPM | WEIGHT: 154 LBS | OXYGEN SATURATION: 100 %

## 2024-12-26 DIAGNOSIS — R55 SYNCOPE AND COLLAPSE: Primary | ICD-10-CM

## 2024-12-26 DIAGNOSIS — I31.39 OTHER PERICARDIAL EFFUSION (NONINFLAMMATORY) (HHS-HCC): ICD-10-CM

## 2024-12-26 DIAGNOSIS — Z99.2 END STAGE RENAL DISEASE ON DIALYSIS (MULTI): ICD-10-CM

## 2024-12-26 DIAGNOSIS — N18.6 END STAGE RENAL DISEASE ON DIALYSIS (MULTI): ICD-10-CM

## 2024-12-26 LAB
ALBUMIN SERPL BCP-MCNC: 3.7 G/DL (ref 3.4–5)
ALP SERPL-CCNC: 86 U/L (ref 33–136)
ALT SERPL W P-5'-P-CCNC: 8 U/L (ref 7–45)
ANION GAP SERPL CALC-SCNC: 13 MMOL/L (ref 10–20)
AST SERPL W P-5'-P-CCNC: 19 U/L (ref 9–39)
ATRIAL RATE: 77 BPM
BASOPHILS # BLD AUTO: 0.07 X10*3/UL (ref 0–0.1)
BASOPHILS NFR BLD AUTO: 0.7 %
BILIRUB SERPL-MCNC: 0.6 MG/DL (ref 0–1.2)
BNP SERPL-MCNC: 924 PG/ML (ref 0–99)
BUN SERPL-MCNC: 34 MG/DL (ref 6–23)
CALCIUM SERPL-MCNC: 8.7 MG/DL (ref 8.6–10.3)
CARDIAC TROPONIN I PNL SERPL HS: 25 NG/L (ref 0–13)
CARDIAC TROPONIN I PNL SERPL HS: 25 NG/L (ref 0–13)
CHLORIDE SERPL-SCNC: 95 MMOL/L (ref 98–107)
CO2 SERPL-SCNC: 30 MMOL/L (ref 21–32)
CREAT SERPL-MCNC: 3.38 MG/DL (ref 0.5–1.05)
EGFRCR SERPLBLD CKD-EPI 2021: 14 ML/MIN/1.73M*2
EOSINOPHIL # BLD AUTO: 0.2 X10*3/UL (ref 0–0.7)
EOSINOPHIL NFR BLD AUTO: 1.9 %
ERYTHROCYTE [DISTWIDTH] IN BLOOD BY AUTOMATED COUNT: 13.4 % (ref 11.5–14.5)
GLUCOSE BLD MANUAL STRIP-MCNC: 133 MG/DL (ref 74–99)
GLUCOSE BLD MANUAL STRIP-MCNC: 227 MG/DL (ref 74–99)
GLUCOSE SERPL-MCNC: 232 MG/DL (ref 74–99)
HCT VFR BLD AUTO: 44.7 % (ref 36–46)
HGB BLD-MCNC: 14.2 G/DL (ref 12–16)
IMM GRANULOCYTES # BLD AUTO: 0.07 X10*3/UL (ref 0–0.7)
IMM GRANULOCYTES NFR BLD AUTO: 0.7 % (ref 0–0.9)
INR PPP: 1 (ref 0.9–1.1)
LACTATE SERPL-SCNC: 1.6 MMOL/L (ref 0.4–2)
LYMPHOCYTES # BLD AUTO: 3.75 X10*3/UL (ref 1.2–4.8)
LYMPHOCYTES NFR BLD AUTO: 34.9 %
MAGNESIUM SERPL-MCNC: 2.12 MG/DL (ref 1.6–2.4)
MCH RBC QN AUTO: 31.7 PG (ref 26–34)
MCHC RBC AUTO-ENTMCNC: 31.8 G/DL (ref 32–36)
MCV RBC AUTO: 100 FL (ref 80–100)
MONOCYTES # BLD AUTO: 0.66 X10*3/UL (ref 0.1–1)
MONOCYTES NFR BLD AUTO: 6.1 %
NEUTROPHILS # BLD AUTO: 5.99 X10*3/UL (ref 1.2–7.7)
NEUTROPHILS NFR BLD AUTO: 55.7 %
NRBC BLD-RTO: 0 /100 WBCS (ref 0–0)
P AXIS: 61 DEGREES
P OFFSET: 169 MS
P ONSET: 140 MS
PLATELET # BLD AUTO: 159 X10*3/UL (ref 150–450)
POTASSIUM SERPL-SCNC: 4.9 MMOL/L (ref 3.5–5.3)
PR INTERVAL: 158 MS
PROT SERPL-MCNC: 6.9 G/DL (ref 6.4–8.2)
PROTHROMBIN TIME: 11.1 SECONDS (ref 9.8–12.8)
Q ONSET: 219 MS
QRS COUNT: 13 BEATS
QRS DURATION: 112 MS
QT INTERVAL: 440 MS
QTC CALCULATION(BAZETT): 497 MS
QTC FREDERICIA: 478 MS
R AXIS: -44 DEGREES
RBC # BLD AUTO: 4.48 X10*6/UL (ref 4–5.2)
SODIUM SERPL-SCNC: 133 MMOL/L (ref 136–145)
T AXIS: 116 DEGREES
T OFFSET: 439 MS
VENTRICULAR RATE: 77 BPM
WBC # BLD AUTO: 10.7 X10*3/UL (ref 4.4–11.3)

## 2024-12-26 PROCEDURE — 83735 ASSAY OF MAGNESIUM: CPT | Performed by: EMERGENCY MEDICINE

## 2024-12-26 PROCEDURE — 2500000001 HC RX 250 WO HCPCS SELF ADMINISTERED DRUGS (ALT 637 FOR MEDICARE OP): Performed by: INTERNAL MEDICINE

## 2024-12-26 PROCEDURE — 80053 COMPREHEN METABOLIC PANEL: CPT | Performed by: EMERGENCY MEDICINE

## 2024-12-26 PROCEDURE — 85025 COMPLETE CBC W/AUTO DIFF WBC: CPT | Performed by: EMERGENCY MEDICINE

## 2024-12-26 PROCEDURE — S4991 NICOTINE PATCH NONLEGEND: HCPCS | Performed by: INTERNAL MEDICINE

## 2024-12-26 PROCEDURE — G0378 HOSPITAL OBSERVATION PER HR: HCPCS

## 2024-12-26 PROCEDURE — 84484 ASSAY OF TROPONIN QUANT: CPT | Performed by: EMERGENCY MEDICINE

## 2024-12-26 PROCEDURE — 83880 ASSAY OF NATRIURETIC PEPTIDE: CPT | Performed by: EMERGENCY MEDICINE

## 2024-12-26 PROCEDURE — 94760 N-INVAS EAR/PLS OXIMETRY 1: CPT

## 2024-12-26 PROCEDURE — 82947 ASSAY GLUCOSE BLOOD QUANT: CPT

## 2024-12-26 PROCEDURE — 83036 HEMOGLOBIN GLYCOSYLATED A1C: CPT | Mod: GENLAB | Performed by: INTERNAL MEDICINE

## 2024-12-26 PROCEDURE — 83605 ASSAY OF LACTIC ACID: CPT | Performed by: EMERGENCY MEDICINE

## 2024-12-26 PROCEDURE — 71045 X-RAY EXAM CHEST 1 VIEW: CPT

## 2024-12-26 PROCEDURE — 99285 EMERGENCY DEPT VISIT HI MDM: CPT | Mod: 25 | Performed by: EMERGENCY MEDICINE

## 2024-12-26 PROCEDURE — 2500000002 HC RX 250 W HCPCS SELF ADMINISTERED DRUGS (ALT 637 FOR MEDICARE OP, ALT 636 FOR OP/ED): Mod: MUE | Performed by: INTERNAL MEDICINE

## 2024-12-26 PROCEDURE — 93005 ELECTROCARDIOGRAM TRACING: CPT

## 2024-12-26 PROCEDURE — 2500000005 HC RX 250 GENERAL PHARMACY W/O HCPCS: Performed by: INTERNAL MEDICINE

## 2024-12-26 PROCEDURE — 99223 1ST HOSP IP/OBS HIGH 75: CPT | Performed by: INTERNAL MEDICINE

## 2024-12-26 PROCEDURE — 94640 AIRWAY INHALATION TREATMENT: CPT

## 2024-12-26 PROCEDURE — 85610 PROTHROMBIN TIME: CPT | Performed by: EMERGENCY MEDICINE

## 2024-12-26 PROCEDURE — 94664 DEMO&/EVAL PT USE INHALER: CPT

## 2024-12-26 PROCEDURE — 71045 X-RAY EXAM CHEST 1 VIEW: CPT | Mod: FOREIGN READ | Performed by: RADIOLOGY

## 2024-12-26 RX ORDER — PANTOPRAZOLE SODIUM 40 MG/1
40 TABLET, DELAYED RELEASE ORAL 2 TIMES DAILY
Status: DISCONTINUED | OUTPATIENT
Start: 2024-12-26 | End: 2024-12-28 | Stop reason: HOSPADM

## 2024-12-26 RX ORDER — CLOPIDOGREL BISULFATE 75 MG/1
75 TABLET ORAL DAILY
Status: DISCONTINUED | OUTPATIENT
Start: 2024-12-26 | End: 2024-12-28 | Stop reason: HOSPADM

## 2024-12-26 RX ORDER — DEXTROSE 50 % IN WATER (D50W) INTRAVENOUS SYRINGE
25
Status: DISCONTINUED | OUTPATIENT
Start: 2024-12-26 | End: 2024-12-28 | Stop reason: HOSPADM

## 2024-12-26 RX ORDER — VENLAFAXINE HYDROCHLORIDE 75 MG/1
150 CAPSULE, EXTENDED RELEASE ORAL DAILY
Status: DISCONTINUED | OUTPATIENT
Start: 2024-12-26 | End: 2024-12-28 | Stop reason: HOSPADM

## 2024-12-26 RX ORDER — IPRATROPIUM BROMIDE AND ALBUTEROL SULFATE 2.5; .5 MG/3ML; MG/3ML
3 SOLUTION RESPIRATORY (INHALATION)
Status: DISCONTINUED | OUTPATIENT
Start: 2024-12-26 | End: 2024-12-26

## 2024-12-26 RX ORDER — ALBUTEROL SULFATE 0.83 MG/ML
2.5 SOLUTION RESPIRATORY (INHALATION) EVERY 4 HOURS PRN
Status: DISCONTINUED | OUTPATIENT
Start: 2024-12-26 | End: 2024-12-26

## 2024-12-26 RX ORDER — IPRATROPIUM BROMIDE AND ALBUTEROL SULFATE 2.5; .5 MG/3ML; MG/3ML
3 SOLUTION RESPIRATORY (INHALATION)
Status: DISCONTINUED | OUTPATIENT
Start: 2024-12-26 | End: 2024-12-28 | Stop reason: HOSPADM

## 2024-12-26 RX ORDER — METOPROLOL SUCCINATE 50 MG/1
50 TABLET, EXTENDED RELEASE ORAL 2 TIMES DAILY
Status: DISCONTINUED | OUTPATIENT
Start: 2024-12-26 | End: 2024-12-28 | Stop reason: HOSPADM

## 2024-12-26 RX ORDER — ATORVASTATIN CALCIUM 40 MG/1
40 TABLET, FILM COATED ORAL DAILY
Status: DISCONTINUED | OUTPATIENT
Start: 2024-12-26 | End: 2024-12-28 | Stop reason: HOSPADM

## 2024-12-26 RX ORDER — ALBUTEROL SULFATE 90 UG/1
2 INHALANT RESPIRATORY (INHALATION) EVERY 4 HOURS PRN
Status: DISCONTINUED | OUTPATIENT
Start: 2024-12-26 | End: 2024-12-26

## 2024-12-26 RX ORDER — ALBUTEROL SULFATE 0.83 MG/ML
2.5 SOLUTION RESPIRATORY (INHALATION) EVERY 2 HOUR PRN
Status: DISCONTINUED | OUTPATIENT
Start: 2024-12-26 | End: 2024-12-28 | Stop reason: HOSPADM

## 2024-12-26 RX ORDER — IBUPROFEN 200 MG
1 TABLET ORAL DAILY
Status: DISCONTINUED | OUTPATIENT
Start: 2024-12-26 | End: 2024-12-28 | Stop reason: HOSPADM

## 2024-12-26 RX ORDER — INSULIN GLARGINE 100 [IU]/ML
10 INJECTION, SOLUTION SUBCUTANEOUS NIGHTLY
Status: DISCONTINUED | OUTPATIENT
Start: 2024-12-26 | End: 2024-12-28 | Stop reason: HOSPADM

## 2024-12-26 RX ORDER — AMIODARONE HYDROCHLORIDE 200 MG/1
200 TABLET ORAL
Status: DISCONTINUED | OUTPATIENT
Start: 2024-12-27 | End: 2024-12-28 | Stop reason: HOSPADM

## 2024-12-26 RX ORDER — LEVOTHYROXINE SODIUM 50 UG/1
50 TABLET ORAL
Status: DISCONTINUED | OUTPATIENT
Start: 2024-12-27 | End: 2024-12-28 | Stop reason: HOSPADM

## 2024-12-26 RX ORDER — DEXTROSE 50 % IN WATER (D50W) INTRAVENOUS SYRINGE
12.5
Status: DISCONTINUED | OUTPATIENT
Start: 2024-12-26 | End: 2024-12-28 | Stop reason: HOSPADM

## 2024-12-26 RX ORDER — INSULIN LISPRO 100 [IU]/ML
0-10 INJECTION, SOLUTION INTRAVENOUS; SUBCUTANEOUS
Status: DISCONTINUED | OUTPATIENT
Start: 2024-12-26 | End: 2024-12-28 | Stop reason: HOSPADM

## 2024-12-26 RX ORDER — ASPIRIN 81 MG/1
81 TABLET ORAL DAILY
Status: DISCONTINUED | OUTPATIENT
Start: 2024-12-26 | End: 2024-12-28 | Stop reason: HOSPADM

## 2024-12-26 RX ORDER — LEVOTHYROXINE SODIUM 100 UG/1
200 TABLET ORAL
Status: DISCONTINUED | OUTPATIENT
Start: 2024-12-27 | End: 2024-12-28 | Stop reason: HOSPADM

## 2024-12-26 RX ORDER — ACETAMINOPHEN 325 MG/1
1000 TABLET ORAL 2 TIMES DAILY
Status: DISCONTINUED | OUTPATIENT
Start: 2024-12-26 | End: 2024-12-28 | Stop reason: HOSPADM

## 2024-12-26 RX ORDER — AMLODIPINE BESYLATE 5 MG/1
5 TABLET ORAL DAILY
Status: DISCONTINUED | OUTPATIENT
Start: 2024-12-26 | End: 2024-12-28 | Stop reason: HOSPADM

## 2024-12-26 RX ADMIN — Medication 4 L/MIN: at 17:08

## 2024-12-26 RX ADMIN — INSULIN LISPRO 4 UNITS: 100 INJECTION, SOLUTION INTRAVENOUS; SUBCUTANEOUS at 16:56

## 2024-12-26 RX ADMIN — CLOPIDOGREL 75 MG: 75 TABLET ORAL at 16:56

## 2024-12-26 RX ADMIN — METOPROLOL SUCCINATE 50 MG: 50 TABLET, EXTENDED RELEASE ORAL at 20:25

## 2024-12-26 RX ADMIN — PANTOPRAZOLE SODIUM 40 MG: 40 TABLET, DELAYED RELEASE ORAL at 20:25

## 2024-12-26 RX ADMIN — IPRATROPIUM BROMIDE AND ALBUTEROL SULFATE 3 ML: 2.5; .5 SOLUTION RESPIRATORY (INHALATION) at 21:03

## 2024-12-26 RX ADMIN — NICOTINE 1 PATCH: 14 PATCH, EXTENDED RELEASE TRANSDERMAL at 16:56

## 2024-12-26 RX ADMIN — Medication 4 L/MIN: at 21:03

## 2024-12-26 RX ADMIN — ATORVASTATIN CALCIUM 40 MG: 40 TABLET, FILM COATED ORAL at 16:56

## 2024-12-26 RX ADMIN — ASPIRIN 81 MG: 81 TABLET, COATED ORAL at 16:56

## 2024-12-26 RX ADMIN — ACETAMINOPHEN 975 MG: 325 TABLET, FILM COATED ORAL at 20:25

## 2024-12-26 RX ADMIN — VENLAFAXINE HYDROCHLORIDE 150 MG: 75 CAPSULE, EXTENDED RELEASE ORAL at 16:56

## 2024-12-26 RX ADMIN — AMLODIPINE BESYLATE 5 MG: 5 TABLET ORAL at 16:56

## 2024-12-26 SDOH — ECONOMIC STABILITY: HOUSING INSECURITY: AT ANY TIME IN THE PAST 12 MONTHS, WERE YOU HOMELESS OR LIVING IN A SHELTER (INCLUDING NOW)?: NO

## 2024-12-26 SDOH — ECONOMIC STABILITY: FOOD INSECURITY: WITHIN THE PAST 12 MONTHS, THE FOOD YOU BOUGHT JUST DIDN'T LAST AND YOU DIDN'T HAVE MONEY TO GET MORE.: NEVER TRUE

## 2024-12-26 SDOH — SOCIAL STABILITY: SOCIAL INSECURITY: WITHIN THE LAST YEAR, HAVE YOU BEEN AFRAID OF YOUR PARTNER OR EX-PARTNER?: NO

## 2024-12-26 SDOH — SOCIAL STABILITY: SOCIAL INSECURITY: HAVE YOU HAD THOUGHTS OF HARMING ANYONE ELSE?: NO

## 2024-12-26 SDOH — SOCIAL STABILITY: SOCIAL INSECURITY: ARE THERE ANY APPARENT SIGNS OF INJURIES/BEHAVIORS THAT COULD BE RELATED TO ABUSE/NEGLECT?: NO

## 2024-12-26 SDOH — SOCIAL STABILITY: SOCIAL INSECURITY: HAS ANYONE EVER THREATENED TO HURT YOUR FAMILY OR YOUR PETS?: NO

## 2024-12-26 SDOH — ECONOMIC STABILITY: FOOD INSECURITY: WITHIN THE PAST 12 MONTHS, YOU WORRIED THAT YOUR FOOD WOULD RUN OUT BEFORE YOU GOT THE MONEY TO BUY MORE.: NEVER TRUE

## 2024-12-26 SDOH — SOCIAL STABILITY: SOCIAL INSECURITY: WITHIN THE LAST YEAR, HAVE YOU BEEN HUMILIATED OR EMOTIONALLY ABUSED IN OTHER WAYS BY YOUR PARTNER OR EX-PARTNER?: NO

## 2024-12-26 SDOH — ECONOMIC STABILITY: INCOME INSECURITY: IN THE PAST 12 MONTHS HAS THE ELECTRIC, GAS, OIL, OR WATER COMPANY THREATENED TO SHUT OFF SERVICES IN YOUR HOME?: NO

## 2024-12-26 SDOH — SOCIAL STABILITY: SOCIAL INSECURITY: DO YOU FEEL ANYONE HAS EXPLOITED OR TAKEN ADVANTAGE OF YOU FINANCIALLY OR OF YOUR PERSONAL PROPERTY?: NO

## 2024-12-26 SDOH — ECONOMIC STABILITY: HOUSING INSECURITY: IN THE LAST 12 MONTHS, WAS THERE A TIME WHEN YOU WERE NOT ABLE TO PAY THE MORTGAGE OR RENT ON TIME?: NO

## 2024-12-26 SDOH — ECONOMIC STABILITY: FOOD INSECURITY: HOW HARD IS IT FOR YOU TO PAY FOR THE VERY BASICS LIKE FOOD, HOUSING, MEDICAL CARE, AND HEATING?: NOT HARD AT ALL

## 2024-12-26 SDOH — ECONOMIC STABILITY: HOUSING INSECURITY: IN THE PAST 12 MONTHS, HOW MANY TIMES HAVE YOU MOVED WHERE YOU WERE LIVING?: 0

## 2024-12-26 SDOH — ECONOMIC STABILITY: TRANSPORTATION INSECURITY: IN THE PAST 12 MONTHS, HAS LACK OF TRANSPORTATION KEPT YOU FROM MEDICAL APPOINTMENTS OR FROM GETTING MEDICATIONS?: NO

## 2024-12-26 SDOH — SOCIAL STABILITY: SOCIAL INSECURITY: WERE YOU ABLE TO COMPLETE ALL THE BEHAVIORAL HEALTH SCREENINGS?: YES

## 2024-12-26 SDOH — SOCIAL STABILITY: SOCIAL INSECURITY: DO YOU FEEL UNSAFE GOING BACK TO THE PLACE WHERE YOU ARE LIVING?: NO

## 2024-12-26 SDOH — SOCIAL STABILITY: SOCIAL INSECURITY: ABUSE: ADULT

## 2024-12-26 SDOH — SOCIAL STABILITY: SOCIAL INSECURITY: HAVE YOU HAD ANY THOUGHTS OF HARMING ANYONE ELSE?: NO

## 2024-12-26 SDOH — SOCIAL STABILITY: SOCIAL INSECURITY: ARE YOU OR HAVE YOU BEEN THREATENED OR ABUSED PHYSICALLY, EMOTIONALLY, OR SEXUALLY BY ANYONE?: NO

## 2024-12-26 SDOH — SOCIAL STABILITY: SOCIAL INSECURITY: DOES ANYONE TRY TO KEEP YOU FROM HAVING/CONTACTING OTHER FRIENDS OR DOING THINGS OUTSIDE YOUR HOME?: NO

## 2024-12-26 ASSESSMENT — COGNITIVE AND FUNCTIONAL STATUS - GENERAL
TURNING FROM BACK TO SIDE WHILE IN FLAT BAD: A LITTLE
MOVING FROM LYING ON BACK TO SITTING ON SIDE OF FLAT BED WITH BEDRAILS: A LITTLE
MOVING FROM LYING ON BACK TO SITTING ON SIDE OF FLAT BED WITH BEDRAILS: A LITTLE
HELP NEEDED FOR BATHING: A LITTLE
STANDING UP FROM CHAIR USING ARMS: A LITTLE
STANDING UP FROM CHAIR USING ARMS: A LITTLE
MOBILITY SCORE: 16
MOVING TO AND FROM BED TO CHAIR: A LITTLE
DAILY ACTIVITIY SCORE: 19
DRESSING REGULAR UPPER BODY CLOTHING: A LITTLE
MOVING TO AND FROM BED TO CHAIR: A LITTLE
DAILY ACTIVITIY SCORE: 19
HELP NEEDED FOR BATHING: A LITTLE
TOILETING: A LITTLE
DRESSING REGULAR LOWER BODY CLOTHING: A LOT
DRESSING REGULAR UPPER BODY CLOTHING: A LITTLE
TOILETING: A LITTLE
TURNING FROM BACK TO SIDE WHILE IN FLAT BAD: A LITTLE
CLIMB 3 TO 5 STEPS WITH RAILING: A LOT
PATIENT BASELINE BEDBOUND: NO
WALKING IN HOSPITAL ROOM: A LOT
CLIMB 3 TO 5 STEPS WITH RAILING: A LOT
MOBILITY SCORE: 16
WALKING IN HOSPITAL ROOM: A LOT
DRESSING REGULAR LOWER BODY CLOTHING: A LOT

## 2024-12-26 ASSESSMENT — LIFESTYLE VARIABLES
HOW OFTEN DO YOU HAVE A DRINK CONTAINING ALCOHOL: NEVER
HOW OFTEN DO YOU HAVE 6 OR MORE DRINKS ON ONE OCCASION: NEVER
AUDIT-C TOTAL SCORE: 0
HOW MANY STANDARD DRINKS CONTAINING ALCOHOL DO YOU HAVE ON A TYPICAL DAY: PATIENT DOES NOT DRINK
SKIP TO QUESTIONS 9-10: 1
AUDIT-C TOTAL SCORE: 0

## 2024-12-26 ASSESSMENT — ENCOUNTER SYMPTOMS
ALLERGIC/IMMUNOLOGIC NEGATIVE: 1
APPETITE CHANGE: 1
ACTIVITY CHANGE: 1
BACK PAIN: 1
SHORTNESS OF BREATH: 1
ENDOCRINE NEGATIVE: 1
CARDIOVASCULAR NEGATIVE: 1
WOUND: 1
LIGHT-HEADEDNESS: 1
NAUSEA: 1
PSYCHIATRIC NEGATIVE: 1
WEAKNESS: 1
HEMATOLOGIC/LYMPHATIC NEGATIVE: 1
ARTHRALGIAS: 1
FATIGUE: 1

## 2024-12-26 ASSESSMENT — PAIN - FUNCTIONAL ASSESSMENT
PAIN_FUNCTIONAL_ASSESSMENT: 0-10

## 2024-12-26 ASSESSMENT — ACTIVITIES OF DAILY LIVING (ADL)
TOILETING: NEEDS ASSISTANCE
PATIENT'S MEMORY ADEQUATE TO SAFELY COMPLETE DAILY ACTIVITIES?: YES
BATHING: NEEDS ASSISTANCE
HEARING - RIGHT EAR: HEARING AID
GROOMING: INDEPENDENT
DRESSING YOURSELF: NEEDS ASSISTANCE
ASSISTIVE_DEVICE: WALKER
LACK_OF_TRANSPORTATION: NO
WALKS IN HOME: NEEDS ASSISTANCE
JUDGMENT_ADEQUATE_SAFELY_COMPLETE_DAILY_ACTIVITIES: YES
FEEDING YOURSELF: INDEPENDENT
ADEQUATE_TO_COMPLETE_ADL: YES
LACK_OF_TRANSPORTATION: NO
HEARING - LEFT EAR: HEARING AID

## 2024-12-26 ASSESSMENT — COLUMBIA-SUICIDE SEVERITY RATING SCALE - C-SSRS
1. IN THE PAST MONTH, HAVE YOU WISHED YOU WERE DEAD OR WISHED YOU COULD GO TO SLEEP AND NOT WAKE UP?: NO
6. HAVE YOU EVER DONE ANYTHING, STARTED TO DO ANYTHING, OR PREPARED TO DO ANYTHING TO END YOUR LIFE?: NO
6. HAVE YOU EVER DONE ANYTHING, STARTED TO DO ANYTHING, OR PREPARED TO DO ANYTHING TO END YOUR LIFE?: NO
2. HAVE YOU ACTUALLY HAD ANY THOUGHTS OF KILLING YOURSELF?: NO
1. IN THE PAST MONTH, HAVE YOU WISHED YOU WERE DEAD OR WISHED YOU COULD GO TO SLEEP AND NOT WAKE UP?: NO
2. HAVE YOU ACTUALLY HAD ANY THOUGHTS OF KILLING YOURSELF?: NO

## 2024-12-26 ASSESSMENT — PATIENT HEALTH QUESTIONNAIRE - PHQ9
SUM OF ALL RESPONSES TO PHQ9 QUESTIONS 1 & 2: 0
1. LITTLE INTEREST OR PLEASURE IN DOING THINGS: NOT AT ALL
2. FEELING DOWN, DEPRESSED OR HOPELESS: NOT AT ALL

## 2024-12-26 ASSESSMENT — PAIN SCALES - GENERAL
PAINLEVEL_OUTOF10: 0 - NO PAIN
PAINLEVEL_OUTOF10: 0 - NO PAIN
PAINLEVEL_OUTOF10: 4
PAINLEVEL_OUTOF10: 0 - NO PAIN

## 2024-12-26 NOTE — ED PROVIDER NOTES
Department of Emergency Medicine   ED  Provider Note  Admit Date/RoomTime: 12/26/2024  8:16 AM  ED Room: AC01/AC01                  History of Present Illness:   Daphne Morris is a 69 y.o. female presenting to the ED for syncope at dialysis, beginning just prior to arrival.  The complaint has been  a single event , moderate in severity, and worsened by nothing.  Patient had witnessed syncope at dialysis.  This was after they placed her dialysis catheter.  She has a right upper extremity fistula.  She denies any chest pain or shortness of breath.  She does not recall what happened at the dialysis center.  She reports her last dialysis was on Tuesday.      Review of Systems:   Pertinent positives and review of systems as noted above.  Remaining 10 review of systems is negative or noncontributory to today's episode of care.  Review of Systems   A complete review of systems is otherwise negative except as noted above.  Patient is somewhat limited historian.    --------------------------------------------- PAST HISTORY ---------------------------------------------  Past Medical History:  has a past medical history of Anal fissure (10/12/2023), Anemia, ASHD (arteriosclerotic heart disease), At risk for falls, Atrial fibrillation (Multi), CHF (congestive heart failure), CKD (chronic kidney disease), COPD (chronic obstructive pulmonary disease) (Multi), CVA (cerebral vascular accident) (Multi), Depression, Diabetes mellitus (Multi), GERD (gastroesophageal reflux disease), H/O pleural effusion, Hyperlipidemia, Hypertension, Hypothyroidism, Hypoxia, Impacted cerumen, left ear, Noncompliance, Osteoarthritis, Perianal dermatitis (10/12/2023), Personal history of other diseases of the respiratory system, PVD (peripheral vascular disease) (CMS-HCC), Renal carcinoma, right (Multi), Respiratory failure (Multi), TIA (transient ischemic attack), Vasculitis (CMS-[facility]) (10/12/2023), and Weakness.    Past Surgical History:  has a past  surgical history that includes MR angio chest w and wo IV contrast (2023); MR angio head wo IV contrast (2021);  section, classic; Shoulder surgery; Ankle surgery; Cataract extraction (Right); Nephrectomy (2021); and Cardiac catheterization (N/A, 2024).    Social History:  reports that she has been smoking cigarettes. She started smoking about 56 years ago. She has a 28 pack-year smoking history. She has never been exposed to tobacco smoke. She has never used smokeless tobacco. She reports that she does not currently use alcohol. She reports that she does not currently use drugs after having used the following drugs: Marijuana.    Family History: family history includes Asthma in her daughter; COPD in an other family member; Cancer in her brother and sister; Diabetes in her daughter, father, maternal grandfather, and another family member; Heart attack in her daughter; Heart disease in her father and paternal grandmother; Hypertension in her mother and another family member; chronic lung disease in an other family member. Unless otherwise noted, family history is non contributory    Discharge Medication List as of 2024  2:04 PM        CONTINUE these medications which have NOT CHANGED    Details   acetaminophen (Tylenol) 500 mg tablet Take 2 tablets (1,000 mg) by mouth 2 times a day., Historical Med      albuterol 90 mcg/actuation inhaler INHALE 2 PUFFS BY MOUTH EVERY 4 HOURS AS NEEDED, Starting 2024, Normal      amiodarone (Pacerone) 200 mg tablet TAKE 1 TABLET BY MOUTH ONCE DAILY WITH BREAKFAST., Starting Mon 2024, Normal      amLODIPine (Norvasc) 5 mg tablet Take 1 tablet (5 mg) by mouth once daily., Historical Med      aspirin 81 mg EC tablet Take 1 tablet (81 mg) by mouth once daily., Historical Med      atorvastatin (Lipitor) 40 mg tablet TAKE ONE TABLET BY MOUTH EVERY DAY, Starting Fri 2024, Normal      bacitracin-polymyxin B (Polysporin) ophthalmic  "ointment Apply to affected eye(s)., Starting Fri 9/13/2024, Historical Med      BD Calista 2nd Gen Pen Needle 32 gauge x 5/32\" needle USE SUBCUTANEOUSLY AS DIRECTED TWICE DAILY, Normal      clopidogrel (Plavix) 75 mg tablet Take 1 tablet (75 mg) by mouth once daily., Historical Med      ferrous gluconate 324 (38 Fe) mg tablet Take 1 tablet (324 mg) by mouth once daily with breakfast., Starting Tue 10/15/2024, Normal      FreeStyle Shakir 14 Day Sensor kit USE AS DIRECTED TO CHECK SUGARS 3 TO 4 TIMES DAILY, Normal      FreeStyle Shakir 2 [place] misc Use as instructed, Normal      FreeStyle Shakir 3 [place] misc Use as instructed, Normal      FreeStyle Shakir 3 Sensor device Change sensor Q 14 days, Normal      HumaLOG KwikPen Insulin 100 unit/mL injection up to 15 units Subcutaneous three times a day per sliding scaleup to 15 units Subcutaneous three times a day per sliding scale, Normal      ipratropium-albuteroL (Duo-Neb) 0.5-2.5 mg/3 mL nebulizer solution INHALE THE CONTENTS OF 1 VIAL VIA NEBULIZER EVERY 6 HOURS AS NEEDED., Normal      Lantus Solostar U-100 Insulin 100 unit/mL (3 mL) pen Inject 10 Units under the skin once daily at bedtime., Starting Mon 4/29/2024, Normal      !! levothyroxine (Synthroid, Levoxyl) 200 mcg tablet Take 1 tablet by mouth once daily in the morning before a meal., Starting Mon 10/14/2024, Normal      !! levothyroxine (Synthroid, Levoxyl) 50 mcg tablet Take 1 tablet (50 mcg) by mouth once daily in the morning. Take before meals., Starting Fri 5/3/2024, Normal      metoprolol succinate XL (Toprol-XL) 50 mg 24 hr tablet TAKE ONE TABLET BY MOUTH TWO TIMES A DAY, Starting Wed 8/7/2024, Normal      oxygen DME/Hospice (O2) therapy Inhale 3 L/min once daily at bedtime. Indications: decreased oxygen in the tissues or blood, Historical Med - Home Care      pantoprazole (ProtoNix) 40 mg EC tablet TAKE ONE TABLET BY MOUTH TWO TIMES A DAY, Starting Mon 12/16/2024, Normal      potassium chloride CR 20 mEq " ER tablet Take 1 tablet (20 mEq) by mouth once daily., Starting Thu 3/2/2023, Historical Med      venlafaxine XR (Effexor-XR) 75 mg 24 hr capsule Take 2 capsules (150 mg) by mouth once daily., Starting Fri 5/3/2024, Normal       !! - Potential duplicate medications found. Please discuss with provider.         The patient’s home medications have been reviewed.    Allergies: Bee venom protein (honey bee); Citalopram; Codeine; Influenza virus vaccines; Latex; Latex, natural rubber; Lisinopril; Penicillins; Adhesive tape-silicones; Amoxicillin; Doxycycline; Oseltamivir; Phenyleph-min oil-petrolatum; Phenyleph-shark oil-glyc-pet; Phenylephrine; Prednisone; Tuberculin ppd; and Xylometazoline    -------------------------------------------------- RESULTS -------------------------------------------------  All laboratory and radiology results have been personally reviewed by myself   LABS:  Labs Reviewed   CBC WITH AUTO DIFFERENTIAL - Abnormal       Result Value    WBC 10.7      nRBC 0.0      RBC 4.48      Hemoglobin 14.2      Hematocrit 44.7            MCH 31.7      MCHC 31.8 (*)     RDW 13.4      Platelets 159      Neutrophils % 55.7      Immature Granulocytes %, Automated 0.7      Lymphocytes % 34.9      Monocytes % 6.1      Eosinophils % 1.9      Basophils % 0.7      Neutrophils Absolute 5.99      Immature Granulocytes Absolute, Automated 0.07      Lymphocytes Absolute 3.75      Monocytes Absolute 0.66      Eosinophils Absolute 0.20      Basophils Absolute 0.07     COMPREHENSIVE METABOLIC PANEL - Abnormal    Glucose 232 (*)     Sodium 133 (*)     Potassium 4.9      Chloride 95 (*)     Bicarbonate 30      Anion Gap 13      Urea Nitrogen 34 (*)     Creatinine 3.38 (*)     eGFR 14 (*)     Calcium 8.7      Albumin 3.7      Alkaline Phosphatase 86      Total Protein 6.9      AST 19      Bilirubin, Total 0.6      ALT 8     B-TYPE NATRIURETIC PEPTIDE - Abnormal     (*)     Narrative:        <100 pg/mL - Heart  failure unlikely  100-299 pg/mL - Intermediate probability of acute heart                  failure exacerbation. Correlate with clinical                  context and patient history.    >=300 pg/mL - Heart Failure likely. Correlate with clinical                  context and patient history.    BNP testing is performed using different testing methodology at [facility] than at other [facility]. Direct result comparisons should only be made within the same method.      SERIAL TROPONIN-INITIAL - Abnormal    Troponin I, High Sensitivity 25 (*)     Narrative:     Less than 99th percentile of normal range cutoff-  Female and children under 18 years old <14 ng/L; Male <21 ng/L: Negative  Repeat testing should be performed if clinically indicated.     Female and children under 18 years old 14-50 ng/L; Male 21-50 ng/L:  Consistent with possible cardiac damage and possible increased clinical   risk. Serial measurements may help to assess extent of myocardial damage.     >50 ng/L: Consistent with cardiac damage, increased clinical risk and  myocardial infarction. Serial measurements may help assess extent of   myocardial damage.      NOTE: Children less than 1 year old may have higher baseline troponin   levels and results should be interpreted in conjunction with the overall   clinical context.     NOTE: Troponin I testing is performed using a different   testing methodology at [facility] than at other   [facility]. Direct result comparisons should only   be made within the same method.   SERIAL TROPONIN, 1 HOUR - Abnormal    Troponin I, High Sensitivity 25 (*)     Narrative:     Less than 99th percentile of normal range cutoff-  Female and children under 18 years old <14 ng/L; Male <21 ng/L: Negative  Repeat testing should be performed if clinically indicated.     Female and children under 18 years old 14-50 ng/L; Male 21-50 ng/L:  Consistent with possible cardiac damage and possible  increased clinical   risk. Serial measurements may help to assess extent of myocardial damage.     >50 ng/L: Consistent with cardiac damage, increased clinical risk and  myocardial infarction. Serial measurements may help assess extent of   myocardial damage.      NOTE: Children less than 1 year old may have higher baseline troponin   levels and results should be interpreted in conjunction with the overall   clinical context.     NOTE: Troponin I testing is performed using a different   testing methodology at [facility] than at other   [facility]. Direct result comparisons should only   be made within the same method.   MAGNESIUM - Normal    Magnesium 2.12     LACTATE - Normal    Lactate 1.6      Narrative:     Venipuncture immediately after or during the administration of Metamizole may lead to falsely low results. Testing should be performed immediately prior to Metamizole dosing.   PROTIME-INR - Normal    Protime 11.1      INR 1.0     TROPONIN SERIES- (INITIAL, 1 HR)    Narrative:     The following orders were created for panel order Troponin I Series, High Sensitivity (0, 1 HR).  Procedure                               Abnormality         Status                     ---------                               -----------         ------                     Troponin I, High Sensiti...[[id number]]  Abnormal            Final result               Troponin, High Sensitivi...[[id number]]  Abnormal            Final result                 Please view results for these tests on the individual orders.         RADIOLOGY:  Interpreted by Radiologist.  XR chest 1 view   Final Result   Right-sided pleural effusion slightly increased in size compared to   5/14/2024 with associated right basilar atelectasis and/or   consolidation.   Signed by Zane Culver MD          Encounter Date: 12/26/24   ECG 12 lead   Result Value    Ventricular Rate 72    Atrial Rate 72    GA Interval 186    QRS Duration 120    QT Interval  "452    QTC Calculation(Bazett) 494    P Axis 39    R Axis -52    T Axis 102    QRS Count 12    Q Onset 216    P Onset 123    P Offset 170    T Offset 442    QTC Fredericia 480    Narrative    Normal sinus rhythm  Left axis deviation  Minimal voltage criteria for LVH, may be normal variant ( Yung product )  Inferior infarct , age undetermined  Anterolateral infarct (cited on or before 26-JUN-2024)  Abnormal ECG  When compared with ECG of 20-DEC-2024 10:53,  No significant change was found     ------------------------- NURSING NOTES AND VITALS REVIEWED ---------------------------   The nursing notes within the ED encounter and vital signs as below have been reviewed.   /82   Pulse 66   Temp 36 °C (96.8 °F) (Temporal)   Resp 20   Ht 1.676 m (5' 6\")   Wt 69.9 kg (154 lb)   SpO2 100%   BMI 24.86 kg/m²   Oxygen Saturation Interpretation: Normal      ---------------------------------------------------PHYSICAL EXAM--------------------------------------  Physical Exam  Vitals and nursing note reviewed.   Constitutional:       General: She is not in acute distress.     Appearance: She is well-developed. She is not ill-appearing.   HENT:      Head: Normocephalic and atraumatic.      Nose: Nose normal.      Mouth/Throat:      Mouth: Mucous membranes are moist.      Pharynx: Oropharynx is clear. No oropharyngeal exudate or posterior oropharyngeal erythema.   Eyes:      General: No scleral icterus.     Extraocular Movements: Extraocular movements intact.      Conjunctiva/sclera: Conjunctivae normal.      Pupils: Pupils are equal, round, and reactive to light.   Cardiovascular:      Rate and Rhythm: Normal rate and regular rhythm.      Pulses: Normal pulses.      Heart sounds: Normal heart sounds. No murmur heard.  Pulmonary:      Effort: Pulmonary effort is normal. No respiratory distress.      Breath sounds: Normal breath sounds. No wheezing, rhonchi or rales.   Abdominal:      General: Bowel sounds are normal. " There is no distension.      Palpations: Abdomen is soft.      Tenderness: There is no abdominal tenderness. There is no right CVA tenderness, left CVA tenderness, guarding or rebound.   Musculoskeletal:         General: No swelling, tenderness, deformity or signs of injury. Normal range of motion.      Cervical back: Normal range of motion and neck supple.      Right lower leg: No edema.      Left lower leg: No edema.   Skin:     General: Skin is warm and dry.      Capillary Refill: Capillary refill takes less than 2 seconds.      Findings: No rash.   Neurological:      Mental Status: She is alert and oriented to person, place, and time.      Cranial Nerves: No cranial nerve deficit.      Sensory: No sensory deficit.      Motor: No weakness.      Coordination: Coordination normal.   Psychiatric:         Mood and Affect: Mood normal.            Procedures  None  ------------------------------ ED COURSE/MEDICAL DECISION MAKING----------------------    Medical Decision Making:   Patient was seen and examined by me.  IV was started.  Appropriate labs obtained.    ED Course as of 12/26/24 2007   Thu Dec 26, 2024   0901 An EKG at 0817 interpreted by me at 0822.  Normal sinus rhythm rate 72 bpm.  Left axis deviation.   ms, QRS 20 ms,  ms.  LVH by voltage. LBBB,  No STEMI [EC]   1012 White blood cell count 10.7, hemoglobin 14.2, Johnson crit 44.7, platelet count 159,000, [EC]   1013 Comprehensive metabolic panel(!)  Glucose 232, sodium 133, potassium 4.9, chloride 95, bicarb 30, anion gap 13    BUN 34, creatinine 3.38, GFR 14  LFTs are normal [EC]   1013 Troponin I Series, High Sensitivity (0, 1 HR)(!)  First troponin 25,  Second troponin 25,  EKG interpreted by me as stable in comparison to previous EKG. [EC]   1014 B-Type Natriuretic Peptide(!)   patient is in end-stage renal dialysis patient.  Lactate is normal [EC]   1014 Chest x-ray  LUNGS:  Right-sided pleural effusion slightly increased in size  compared to  5/14/2024 with associated right basilar atelectasis and/or  consolidation.     ABDOMEN:  No remarkable upper abdominal findings.     BONES:  No acute osseous changes.  IMPRESSION:  Right-sided pleural effusion slightly increased in size compared to  5/14/2024 with associated right basilar atelectasis and/or  consolidation.  Signed by Zane Culver MD   [EC]   1035 D/w Dr Tillman at [facility] and accepted there in transfer.   She has need for dialysis whichwe do not provide at [facility].   [EC]      ED Course User Index  [EC] Andrey Soto DO         Diagnoses as of 12/26/24 2007   Syncope and collapse   End stage renal disease on dialysis (Multi)      Counseling:   The emergency provider has spoken with the patient and discussed today’s results, in addition to providing specific details for the plan of care and counseling regarding the diagnosis and prognosis.  Questions are answered at this time and they are agreeable with the plan.      --------------------------------- IMPRESSION AND DISPOSITION ---------------------------------        IMPRESSION  1. Syncope and collapse    2. End stage renal disease on dialysis (Multi)        DISPOSITION  Disposition: Transfer to [facility] to Hospitalist Dr Tillman   Patient condition is stable      Billing Provider Critical Care Time: 0 minutes     Andrey Soto DO  12/26/24 2008

## 2024-12-26 NOTE — TELEPHONE ENCOUNTER
Patient is active with [facility]. Patient is currently at [facility] with plan to transfer to [facility] for admission. Per [facility] ED note; Patient had witnessed syncope at dialysis after they placed her dialysis catheter. She has a RUE fistula. She does not recall what happened at the dialysis center. To transfer as [facility] does not provide  dialysis. [facility] will monitor hospitalization and discharge plan.

## 2024-12-26 NOTE — PROGRESS NOTES
12/26/24 1555   Discharge Planning   Living Arrangements Alone   Support Systems Family members;Friends/neighbors   Assistance Needed Alert and oriented x 3, Independent with ADL's, Doesn't drive(Family members and friends transport to appointments and grocery shopping), Rollator, Grab bars, Patient does sponge baths, Home O2 at 3 liters at HS([employer]), Patient receives dialysis at [facility] T-Th-Sat @ 7am,(nephew transports)   Type of Residence Private residence  ([place]   Number of Stairs to Enter Residence 4   Number of Stairs Within Residence 0   Do you have animals or pets at home? Yes   Type of Animals or Pets 1 cat   Who is requesting discharge planning? Provider   Home or Post Acute Services In home services;Post acute facilities (Rehab/SNF/etc)   Type of Post Acute Facility Services Skilled nursing   Type of Home Care Services Home nursing visits;Home OT;Home PT   Expected Discharge Disposition Home   Does the patient need discharge transport arranged? Yes   RoundTrip coordination needed? Yes   Has discharge transport been arranged? No   Financial Resource Strain   How hard is it for you to pay for the very basics like food, housing, medical care, and heating? Not hard   Housing Stability   In the last 12 months, was there a time when you were not able to pay the mortgage or rent on time? N   In the past 12 months, how many times have you moved where you were living? 0   At any time in the past 12 months, were you homeless or living in a shelter (including now)? N   Transportation Needs   In the past 12 months, has lack of transportation kept you from medical appointments or from getting medications? no   In the past 12 months, has lack of transportation kept you from meetings, work, or from getting things needed for daily living? No   Patient Choice   Provider Choice list and CMS website (https://medicare.gov/care-compare#search) for post-acute Quality and Resource Measure  Data were provided and reviewed with: Patient   Patient / Family choosing to utilize agency / facility established prior to hospitalization No   Stroke Family Assessment   Stroke Family Assessment Needed No   Intensity of Service   Intensity of Service 0-30 min

## 2024-12-26 NOTE — ED TRIAGE NOTES
Pt had episode of syncope while at dialysis. Per staff they had just hooked her up to their dialysis machine when she started to complain of feeling SOB, then went unresponsive

## 2024-12-26 NOTE — H&P
History Of Present Illness  Daphne Morris is a 69 y.o. female presenting with LOC. She was at dialysis this morning, they had just accessed her fistula when she said she felt a little sob, and then went unresponsive briefly. No seizure activity. Unknown period of time for LOC. She has no recall of this now. Does admit that when she got up this morning, she didn't feel so good-lightheaded, nauseated but no vomiting. Denies sob, chest pain, palpitations, diarrhea, foul odor to urine, or dysuria, fever, chills. She did not receive her dialysis today     Past Medical History  ESRD, dialysis dependent for about a year. Tues thurs sat schedule  Hypertension  CAD/PVD  Renal cell cancer  Hyperlipidemia  Diabetes  Atrial fib  COPD, on 3 liters, mostly at night  Hypothyroid  Depression  Visual impairment  Anemia  CVA, multiple. Right sided weakness  Surgical History  C  RUE fistula  Ankle surgery  Cataract extraction/retinal detachment procedure x 2  C section  Shoulder surgery     Social History  She reports that she has been smoking cigarettes. She started smoking about 56 years ago. She has a 28 pack-year smoking history. She has never been exposed to tobacco smoke. She has never used smokeless tobacco. She reports that she does not currently use alcohol. She reports that she does not currently use drugs after having used the following drugs: Marijuana.  Lives alone. Uses rollator. Moving to the [place] in january  Family History  Cancer  CAD  DM  Hypertension     Allergies  Bee venom protein (honey bee); Citalopram; Codeine; Influenza virus vaccines; Latex; Latex, natural rubber; Lisinopril; Penicillins; Adhesive tape-silicones; Amoxicillin; Doxycycline; Oseltamivir; Phenyleph-min oil-petrolatum; Phenyleph-shark oil-glyc-pet; Phenylephrine; Prednisone; Tuberculin ppd; and Xylometazoline    Review of Systems   Constitutional:  Positive for activity change, appetite change and fatigue.   HENT: Negative.     Eyes:   Positive for visual disturbance.   Respiratory:  Positive for shortness of breath.    Cardiovascular: Negative.    Gastrointestinal:  Positive for nausea.   Endocrine: Negative.    Genitourinary:  Positive for decreased urine volume.   Musculoskeletal:  Positive for arthralgias, back pain and gait problem.   Skin:  Positive for wound.   Allergic/Immunologic: Negative.    Neurological:  Positive for syncope, weakness and light-headedness.   Hematological: Negative.    Psychiatric/Behavioral: Negative.          Physical Exam  Constitutional:       Appearance: Normal appearance.   HENT:      Head: Normocephalic.      Ears:      Comments: Nikolski     Nose: Nose normal.      Mouth/Throat:      Mouth: Mucous membranes are dry.   Eyes:      Extraocular Movements: Extraocular movements intact.   Neck:      Comments: Jvd is present  No bruits  Cardiovascular:      Rate and Rhythm: Normal rate and regular rhythm.   Pulmonary:      Comments: Decreased breath sounds right base  No wheezing or rhonchi  Abdominal:      General: Bowel sounds are normal.      Palpations: Abdomen is soft.   Musculoskeletal:      Comments: Laceration, knee, dressing in place  No edema  No distal hair growth.   Loud bruit in RUE   Skin:     General: Skin is dry.   Neurological:      Mental Status: She is alert.      Comments: Alert, Nikolski  Speech clear. Poor visual acuity. Tongue midline  Mild rue and rle weakness, able to lift each up off bed  Follows commands, answers questions  Mild memory disturbance noted   Psychiatric:         Mood and Affect: Mood normal.         Behavior: Behavior normal.          Results for orders placed or performed during the hospital encounter of 12/26/24 (from the past 24 hours)   POCT GLUCOSE   Result Value Ref Range    POCT Glucose 227 (H) 74 - 99 mg/dL     *Note: Due to a large number of results and/or encounters for the requested time period, some results have not been displayed. A complete set of results can be found in  "Results Review.       Last Recorded Vitals  Blood pressure 150/74, pulse 62, temperature 36.5 °C (97.7 °F), temperature source Temporal, resp. rate 17, height 1.689 m (5' 6.5\"), weight 71.2 kg (157 lb), SpO2 98%.    Relevant Results    Assessment/Plan   Assessment & Plan  Syncope and collapse    Syncope, witnessed. Unclear etiology. Place on tele. Possible she had tia. No stigmata to suggest PE at this time. Check carotid us if none done recently  CAD/PAF/PVD  Hypertension. Pressures on high side, but hasn't had meds  ESRD, dialysis dependent. Notify nephro of admission  DM. Monitor sugars, may need to adjust meds some. She is unable to check her sugars, unclear if she takes insulin as ordered. Meals erratic, she can't see to cook  Tobacco abuse. Patch to be ordered  COPD, o2 dependent, at night. 3 liters  Hx CVA, with residual right sided weakness, memory issues.   Hx RCC  Hypothyroid, check status of supplementation  Depression, continue meds  Consult ss, see if we can get her to [facility] from here       I spent 38 minutes in the professional and overall care of this patient.      Alejandra Marshall MD    "

## 2024-12-27 ENCOUNTER — APPOINTMENT (OUTPATIENT)
Dept: DIALYSIS | Facility: HOSPITAL | Age: 69
End: 2024-12-27
Payer: MEDICARE

## 2024-12-27 ENCOUNTER — APPOINTMENT (OUTPATIENT)
Dept: CARDIOLOGY | Facility: HOSPITAL | Age: 69
End: 2024-12-27
Payer: MEDICARE

## 2024-12-27 ENCOUNTER — APPOINTMENT (OUTPATIENT)
Dept: VASCULAR MEDICINE | Facility: HOSPITAL | Age: 69
End: 2024-12-27
Payer: MEDICARE

## 2024-12-27 LAB
APPEARANCE UR: CLEAR
BACTERIA #/AREA URNS AUTO: ABNORMAL /HPF
BILIRUB UR STRIP.AUTO-MCNC: NEGATIVE MG/DL
COLOR UR: YELLOW
EJECTION FRACTION APICAL 4 CHAMBER: 55
EJECTION FRACTION: 58 %
EST. AVERAGE GLUCOSE BLD GHB EST-MCNC: 203 MG/DL
GLUCOSE BLD MANUAL STRIP-MCNC: 151 MG/DL (ref 74–99)
GLUCOSE BLD MANUAL STRIP-MCNC: 158 MG/DL (ref 74–99)
GLUCOSE BLD MANUAL STRIP-MCNC: 214 MG/DL (ref 74–99)
GLUCOSE BLD MANUAL STRIP-MCNC: 87 MG/DL (ref 74–99)
GLUCOSE UR STRIP.AUTO-MCNC: ABNORMAL MG/DL
HBA1C MFR BLD: 8.7 %
KETONES UR STRIP.AUTO-MCNC: NEGATIVE MG/DL
LEFT VENTRICLE INTERNAL DIMENSION DIASTOLE: 3.39 CM (ref 3.5–6)
LEUKOCYTE ESTERASE UR QL STRIP.AUTO: NEGATIVE
MITRAL VALVE E/A RATIO: 2.53
NITRITE UR QL STRIP.AUTO: NEGATIVE
PH UR STRIP.AUTO: 6.5 [PH]
PROT UR STRIP.AUTO-MCNC: ABNORMAL MG/DL
RBC # UR STRIP.AUTO: ABNORMAL /UL
RBC #/AREA URNS AUTO: ABNORMAL /HPF
RIGHT VENTRICLE PEAK SYSTOLIC PRESSURE: 22.7 MMHG
SP GR UR STRIP.AUTO: 1.02
SQUAMOUS #/AREA URNS AUTO: ABNORMAL /HPF
UROBILINOGEN UR STRIP.AUTO-MCNC: ABNORMAL MG/DL
WBC #/AREA URNS AUTO: ABNORMAL /HPF

## 2024-12-27 PROCEDURE — 82947 ASSAY GLUCOSE BLOOD QUANT: CPT

## 2024-12-27 PROCEDURE — 99232 SBSQ HOSP IP/OBS MODERATE 35: CPT | Performed by: INTERNAL MEDICINE

## 2024-12-27 PROCEDURE — S4991 NICOTINE PATCH NONLEGEND: HCPCS | Performed by: INTERNAL MEDICINE

## 2024-12-27 PROCEDURE — G0378 HOSPITAL OBSERVATION PER HR: HCPCS

## 2024-12-27 PROCEDURE — 81001 URINALYSIS AUTO W/SCOPE: CPT | Performed by: INTERNAL MEDICINE

## 2024-12-27 PROCEDURE — 94640 AIRWAY INHALATION TREATMENT: CPT

## 2024-12-27 PROCEDURE — 99222 1ST HOSP IP/OBS MODERATE 55: CPT | Performed by: INTERNAL MEDICINE

## 2024-12-27 PROCEDURE — 2500000001 HC RX 250 WO HCPCS SELF ADMINISTERED DRUGS (ALT 637 FOR MEDICARE OP): Performed by: INTERNAL MEDICINE

## 2024-12-27 PROCEDURE — 2500000005 HC RX 250 GENERAL PHARMACY W/O HCPCS: Performed by: INTERNAL MEDICINE

## 2024-12-27 PROCEDURE — 93880 EXTRACRANIAL BILAT STUDY: CPT | Performed by: SURGERY

## 2024-12-27 PROCEDURE — 93325 DOPPLER ECHO COLOR FLOW MAPG: CPT

## 2024-12-27 PROCEDURE — 90937 HEMODIALYSIS REPEATED EVAL: CPT

## 2024-12-27 PROCEDURE — 93880 EXTRACRANIAL BILAT STUDY: CPT

## 2024-12-27 PROCEDURE — 94760 N-INVAS EAR/PLS OXIMETRY 1: CPT

## 2024-12-27 PROCEDURE — 2500000002 HC RX 250 W HCPCS SELF ADMINISTERED DRUGS (ALT 637 FOR MEDICARE OP, ALT 636 FOR OP/ED): Mod: MUE | Performed by: INTERNAL MEDICINE

## 2024-12-27 RX ADMIN — Medication 4 L/MIN: at 20:57

## 2024-12-27 RX ADMIN — AMIODARONE HYDROCHLORIDE 200 MG: 200 TABLET ORAL at 09:00

## 2024-12-27 RX ADMIN — ASPIRIN 81 MG: 81 TABLET, COATED ORAL at 09:01

## 2024-12-27 RX ADMIN — INSULIN GLARGINE 10 UNITS: 100 INJECTION, SOLUTION SUBCUTANEOUS at 20:49

## 2024-12-27 RX ADMIN — NICOTINE 1 PATCH: 14 PATCH, EXTENDED RELEASE TRANSDERMAL at 09:01

## 2024-12-27 RX ADMIN — LEVOTHYROXINE SODIUM 200 MCG: 100 TABLET ORAL at 06:06

## 2024-12-27 RX ADMIN — PANTOPRAZOLE SODIUM 40 MG: 40 TABLET, DELAYED RELEASE ORAL at 20:48

## 2024-12-27 RX ADMIN — INSULIN LISPRO 2 UNITS: 100 INJECTION, SOLUTION INTRAVENOUS; SUBCUTANEOUS at 09:06

## 2024-12-27 RX ADMIN — VENLAFAXINE HYDROCHLORIDE 150 MG: 75 CAPSULE, EXTENDED RELEASE ORAL at 09:01

## 2024-12-27 RX ADMIN — ATORVASTATIN CALCIUM 40 MG: 40 TABLET, FILM COATED ORAL at 09:00

## 2024-12-27 RX ADMIN — CLOPIDOGREL 75 MG: 75 TABLET ORAL at 09:00

## 2024-12-27 RX ADMIN — AMLODIPINE BESYLATE 5 MG: 5 TABLET ORAL at 09:00

## 2024-12-27 RX ADMIN — ACETAMINOPHEN 975 MG: 325 TABLET, FILM COATED ORAL at 09:01

## 2024-12-27 RX ADMIN — PANTOPRAZOLE SODIUM 40 MG: 40 TABLET, DELAYED RELEASE ORAL at 09:01

## 2024-12-27 RX ADMIN — IPRATROPIUM BROMIDE AND ALBUTEROL SULFATE 3 ML: 2.5; .5 SOLUTION RESPIRATORY (INHALATION) at 20:57

## 2024-12-27 RX ADMIN — METOPROLOL SUCCINATE 50 MG: 50 TABLET, EXTENDED RELEASE ORAL at 09:01

## 2024-12-27 RX ADMIN — LEVOTHYROXINE SODIUM 50 MCG: 50 TABLET ORAL at 06:06

## 2024-12-27 RX ADMIN — ACETAMINOPHEN 975 MG: 325 TABLET, FILM COATED ORAL at 20:48

## 2024-12-27 ASSESSMENT — PAIN - FUNCTIONAL ASSESSMENT
PAIN_FUNCTIONAL_ASSESSMENT: NO/DENIES PAIN
PAIN_FUNCTIONAL_ASSESSMENT: 0-10
PAIN_FUNCTIONAL_ASSESSMENT: 0-10

## 2024-12-27 ASSESSMENT — COGNITIVE AND FUNCTIONAL STATUS - GENERAL
TURNING FROM BACK TO SIDE WHILE IN FLAT BAD: A LITTLE
WALKING IN HOSPITAL ROOM: A LOT
MOBILITY SCORE: 13
WALKING IN HOSPITAL ROOM: A LOT
CLIMB 3 TO 5 STEPS WITH RAILING: TOTAL
DRESSING REGULAR LOWER BODY CLOTHING: A LOT
MOVING FROM LYING ON BACK TO SITTING ON SIDE OF FLAT BED WITH BEDRAILS: A LITTLE
MOVING FROM LYING ON BACK TO SITTING ON SIDE OF FLAT BED WITH BEDRAILS: A LITTLE
STANDING UP FROM CHAIR USING ARMS: A LOT
DRESSING REGULAR UPPER BODY CLOTHING: A LITTLE
MOVING TO AND FROM BED TO CHAIR: A LOT
PERSONAL GROOMING: A LITTLE
MOVING TO AND FROM BED TO CHAIR: A LOT
HELP NEEDED FOR BATHING: A LITTLE
PERSONAL GROOMING: A LITTLE
TOILETING: A LITTLE
TOILETING: A LITTLE
HELP NEEDED FOR BATHING: A LITTLE
DRESSING REGULAR LOWER BODY CLOTHING: A LOT
DAILY ACTIVITIY SCORE: 18
CLIMB 3 TO 5 STEPS WITH RAILING: TOTAL
STANDING UP FROM CHAIR USING ARMS: A LOT
TURNING FROM BACK TO SIDE WHILE IN FLAT BAD: A LITTLE
MOBILITY SCORE: 13
DRESSING REGULAR UPPER BODY CLOTHING: A LITTLE
DAILY ACTIVITIY SCORE: 18

## 2024-12-27 ASSESSMENT — ENCOUNTER SYMPTOMS
DIZZINESS: 0
CHILLS: 0
WEAKNESS: 0
FEVER: 0
PALPITATIONS: 0
SHORTNESS OF BREATH: 1
COUGH: 0
ABDOMINAL PAIN: 0
ABDOMINAL DISTENTION: 0

## 2024-12-27 ASSESSMENT — PAIN SCALES - GENERAL
PAINLEVEL_OUTOF10: 0 - NO PAIN
PAINLEVEL_OUTOF10: 0 - NO PAIN

## 2024-12-27 NOTE — POST-PROCEDURE NOTE
Report to Receiving RN:    Report To: Yola Krause  Time Report Called: [id number]  Hand-Off Communication: Patient completed hd tx; removed 2.0L: last bp 108/54 HR 55  Complications During Treatment: No  Ultrafiltration Treatment: No  Medications Administered During Dialysis: No  Blood Products Administered During Dialysis: No  Labs Sent During Dialysis: No  Heparin Drip Rate Changes: No  Dialysis Catheter Dressing: N/A  Last Dressing Change: N/A    Electronic Signatures:  (Signed Yan Larson)     Last Updated: 2:48 PM by YAN LARSON

## 2024-12-27 NOTE — CONSULTS
Inpatient consult to Cardiology  Consult performed by: CARLENE Villegas-CNP  Consult ordered by: Alejandra Marshall MD  Reason for consult: syncope          History Of Present Illness  Daphne Morris is a 69 y.o. female presenting with syncope. She had just arrived at dialysis and was sitting in the chair reading to get accessed and she passed out. She does not recall anything until she was on the stretcher getting loaded in the ambulance. She does report feeling dizzy and lightheaded in the morning prior to getting to dialysis. She denies any worsening shortness of breath or chest pain.     Lab work in the ER showed glucose 232, sodium 133, potassium 4.9, BUN/Cr 34/3.38, lactate 1.6, magnesium 2.1, , troponin 25, 25, INR 1.0, WBC 10.7, H&H 14.2/44.7, UA possible UTI, CXR showed right sided pleural effusion slightly increased in size.    EKG showed SR, anterolateral infarct, no acute ischemic changes from previous EKGs.      Past Medical History  Past Medical History:   Diagnosis Date    Anal fissure 10/12/2023    Anemia     ASHD (arteriosclerotic heart disease)     At risk for falls     Atrial fibrillation (Multi)     CHF (congestive heart failure)     CKD (chronic kidney disease)     COPD (chronic obstructive pulmonary disease) (Multi)     CVA (cerebral vascular accident) (Multi)     2021- right-sided weakness    Depression     Diabetes mellitus (Multi)     GERD (gastroesophageal reflux disease)     H/O pleural effusion     Hyperlipidemia     Hypertension     Hypothyroidism     Hypoxia     Impacted cerumen, left ear     Impacted cerumen of left ear    Noncompliance     Osteoarthritis     Perianal dermatitis 10/12/2023    Personal history of other diseases of the respiratory system     History of acute bronchitis    PVD (peripheral vascular disease) (CMS-[facility])     Renal carcinoma, right (Multi)     s/p partial nephrectomy 8/2021    Respiratory failure (Multi)     TIA (transient ischemic attack)      Vasculitis (CMS-HCC) 10/12/2023    Weakness        Surgical History  Past Surgical History:   Procedure Laterality Date    ANKLE SURGERY          CARDIAC CATHETERIZATION N/A 2024    Procedure: Right Heart Cath;  Surgeon: Nicholas Antonio MD;  Location: [facility] Cardiac Cath Lab;  Service: Cardiovascular;  Laterality: N/A;    CATARACT EXTRACTION Right     2019     SECTION, CLASSIC      MR CHEST ANGIO W AND WO IV CONTRAST  2023    MR CHEST ANGIO W AND WO IV CONTRAST 2023 [facility]    MR HEAD ANGIO WO IV CONTRAST  2021    MR HEAD ANGIO WO IV CONTRAST [facility]    NEPHRECTOMY  2021    SHOULDER SURGERY              Social History  She reports that she has been smoking cigarettes. She started smoking about 56 years ago. She has a 28 pack-year smoking history. She has never been exposed to tobacco smoke. She has never used smokeless tobacco. She reports that she does not currently use alcohol. She reports that she does not currently use drugs after having used the following drugs: Marijuana.    Family History  Family History   Problem Relation Name Age of Onset    Hypertension Mother      Diabetes Father      Heart disease Father      Cancer Sister      Cancer Brother      Diabetes Daughter      Asthma Daughter      Heart attack Daughter      Diabetes Maternal Grandfather      Heart disease Paternal Grandmother      Diabetes Other      Hypertension Other      COPD Other      Other (chronic lung disease) Other          Allergies  Bee venom protein (honey bee); Citalopram; Codeine; Influenza virus vaccines; Latex; Latex, natural rubber; Lisinopril; Penicillins; Adhesive tape-silicones; Amoxicillin; Doxycycline; Oseltamivir; Phenyleph-min oil-petrolatum; Phenyleph-shark oil-glyc-pet; Phenylephrine; Prednisone; Tuberculin ppd; and Xylometazoline    Review of Systems  Review of Systems   Constitutional:  Negative for chills and fever.   Respiratory:  Positive for  "shortness of breath. Negative for cough.    Cardiovascular:  Negative for chest pain, palpitations and leg swelling.   Gastrointestinal:  Negative for abdominal distention and abdominal pain.   Musculoskeletal:  Negative for gait problem.   Neurological:  Positive for syncope. Negative for dizziness and weakness.          Physical Exam  Constitutional: Well developed, awake/alert/oriented x3, no distress, alert and cooperative  Eyes: PERRL, EOMI, clear sclera  ENMT: mucous membranes moist, no apparent injury, no lesions seen  Head/Neck: Neck supple, no apparent injury, thyroid without mass or tenderness, No JVD, trachea midline, no bruits  Respiratory/Thorax: Patent airways, diminished right side with good chest expansion, thorax symmetric  Cardiovascular: Regular, rate and rhythm, no murmurs, 2+ equal pulses of the extremities, normal S 1and S 2  Gastrointestinal: Nondistended, soft, non-tender, no rebound tenderness or guarding, no masses palpable, no organomegaly, +BS, no bruits  Musculoskeletal: ROM intact, no joint swelling, normal strength  Extremities: normal extremities, no cyanosis edema, contusions or wounds, no clubbing  Neurological: alert and oriented x3, intact senses, motor, response and reflexes, normal strength  Lymphatic: No significant lymphadenopathy  Psychological: Appropriate mood and behavior  Skin: Warm and dry, no lesions, no rashes       Last Recorded Vitals  Blood pressure 158/90, pulse 52, temperature 36.6 °C (97.8 °F), temperature source Temporal, resp. rate 18, height 1.689 m (5' 6.5\"), weight 71.2 kg (157 lb), SpO2 100%.    Medications  Scheduled medications  acetaminophen, 975 mg, oral, BID  amiodarone, 200 mg, oral, Daily with breakfast  amLODIPine, 5 mg, oral, Daily  aspirin, 81 mg, oral, Daily  atorvastatin, 40 mg, oral, Daily  clopidogrel, 75 mg, oral, Daily  insulin glargine, 10 Units, subcutaneous, Nightly  insulin lispro, 0-10 Units, subcutaneous, TID AC  ipratropium-albuteroL, " 3 mL, nebulization, TID  levothyroxine, 200 mcg, oral, Daily before breakfast  levothyroxine, 50 mcg, oral, Daily before breakfast  metoprolol succinate XL, 50 mg, oral, BID  nicotine, 1 patch, transdermal, Daily  pantoprazole, 40 mg, oral, BID  venlafaxine XR, 150 mg, oral, Daily      Continuous medications     PRN medications  PRN medications: albuterol, dextrose, dextrose, glucagon, glucagon, oxygen    Relevant Results  Results for orders placed or performed during the hospital encounter of 12/26/24 (from the past 24 hours)   POCT GLUCOSE   Result Value Ref Range    POCT Glucose 227 (H) 74 - 99 mg/dL   POCT GLUCOSE   Result Value Ref Range    POCT Glucose 133 (H) 74 - 99 mg/dL   Urinalysis with Reflex Microscopic   Result Value Ref Range    Color, Urine Yellow Light-Yellow, Yellow, Dark-Yellow    Appearance, Urine Clear Clear    Specific Gravity, Urine 1.022 1.005 - 1.035    pH, Urine 6.5 5.0, 5.5, 6.0, 6.5, 7.0, 7.5, 8.0    Protein, Urine 600 (3+) (A) NEGATIVE, 10 (TRACE), 20 (TRACE) mg/dL    Glucose, Urine 1000 (4+) (A) Normal mg/dL    Blood, Urine 0.06 (1+) (A) NEGATIVE    Ketones, Urine NEGATIVE NEGATIVE mg/dL    Bilirubin, Urine NEGATIVE NEGATIVE    Urobilinogen, Urine 2 (1+) (A) Normal mg/dL    Nitrite, Urine NEGATIVE NEGATIVE    Leukocyte Esterase, Urine NEGATIVE NEGATIVE   Microscopic Only, Urine   Result Value Ref Range    WBC, Urine 6-10 (A) 1-5, NONE /HPF    RBC, Urine 6-10 (A) NONE, 1-2, 3-5 /HPF    Squamous Epithelial Cells, Urine 10-25 (FEW) Reference range not established. /HPF    Bacteria, Urine 3+ (A) NONE SEEN /HPF   POCT GLUCOSE   Result Value Ref Range    POCT Glucose 151 (H) 74 - 99 mg/dL   POCT GLUCOSE   Result Value Ref Range    POCT Glucose 158 (H) 74 - 99 mg/dL   Transthoracic Echo Limited   Result Value Ref Range    BSA 1.83 m2     *Note: Due to a large number of results and/or encounters for the requested time period, some results have not been displayed. A complete set of results can  be found in Results Review.       Transthoracic Echo Limited         Vascular US Carotid Artery Duplex Bilateral             Transthoracic Echo (TTE) Complete    Result Date: 2/5/2024   [facility], [address] [address]               Tel [phone] and Fax [phone] TRANSTHORACIC ECHOCARDIOGRAM REPORT  Patient Name:      MOE MAYA      Reading Physician:    [id number] Nicholas Antonio MD Study Date:        2/5/2024             Ordering Provider:    [id number] DOMONIQUE PURCELL MRN/PID:           [id number]             Fellow: Accession#:        [id number]         Nurse: Date of Birth/Age: 1955 / 68 years Sonographer:          Nina Angel                                                               RDNALLELY Gender:            F                    Additional Staff: Height:            167.64 cm            Admit Date:           2/2/2024 Weight:            102.51 kg            Admission Status:     Inpatient -                                                               Routine BSA:               2.11 m2              Encounter#:           [id number]                                         Department Location:  [facility] Non                                                               Invasive Blood Pressure: 147 /65 mmHg Study Type:    TRANSTHORACIC ECHO (TTE) COMPLETE Diagnosis/ICD: Acute combined systolic (congestive) and diastolic (congestive)                heart failure (CHF)-I50.41 Indication:    Congestive Heart Failure CPT Code:      Echo Complete w Full Doppler-[id number] Patient History: Diabetes:         Yes Pertinent         A-Fib, HTN, Dyspnea and LE Edema. Watchman device, elevated History:          BNP,. Study Detail: The following Echo studies were performed: 2D, M-Mode, Doppler and               color flow.  PHYSICIAN INTERPRETATION: Left  Ventricle: The left ventricular systolic function is normal, with an estimated ejection fraction of 60-65%. There are no regional wall motion abnormalities. The left ventricular cavity size is normal. Spectral Doppler shows an impaired relaxation pattern of left ventricular diastolic filling. Left Atrium: The left atrium is moderately dilated. Right Ventricle: The right ventricle is mildly enlarged. There is mildly reduced right ventricular systolic function. Right Atrium: The right atrium is moderately dilated. Aortic Valve: The aortic valve is trileaflet. There is mild aortic valve cusp calcification. There is trivial aortic valve regurgitation. The peak instantaneous gradient of the aortic valve is 14.1 mmHg. The mean gradient of the aortic valve is 9.1 mmHg. Mitral Valve: The mitral valve is normal in structure. There is mild mitral annular calcification. There is mild to moderate mitral valve regurgitation. Tricuspid Valve: The tricuspid valve is structurally normal. There is moderate tricuspid regurgitation. Pulmonic Valve: The pulmonic valve is structurally normal. There is mild pulmonic valve regurgitation. Pericardium: There is a small to moderate pericardial effusion posterior to the left ventricle. There is no evidence of cardiac tamponade. Aorta: The aortic root is normal. Pulmonary Artery: The tricuspid regurgitant velocity is 3.04 m/s, and with an estimated right atrial pressure of 3 mmHg, the estimated pulmonary artery pressure is mildly elevated with the RVSP at 39.9 mmHg. Pulmonary Veins: The pulmonary veins appear normal and return normally to the left atrium. Systemic Veins: The inferior vena cava appears to be of normal size. There is IVC inspiratory collapse greater than 50%.  CONCLUSIONS:  1. Left ventricular systolic function is normal with a 60-65% estimated ejection fraction.  2. Spectral Doppler shows an impaired relaxation pattern of left ventricular diastolic filling.  3. There is mildly  reduced right ventricular systolic function.  4. The left atrium is moderately dilated.  5. The right atrium is moderately dilated.  6. There is a small to moderate pericardial effusion.  7. There is no evidence of cardiac tamponade.  8. Mild to moderate mitral valve regurgitation.  9. Moderate tricuspid regurgitation visualized. 10. Normal CVP. Estimated PASP around 45 mm Hg. QUANTITATIVE DATA SUMMARY: 2D MEASUREMENTS:                           Normal Ranges: IVSd:          1.52 cm    (0.6-1.1cm) LVPWd:         1.42 cm    (0.6-1.1cm) LVIDd:         4.35 cm    (3.9-5.9cm) LVIDs:         2.94 cm LV Mass Index: 120.7 g/m2 LV % FS        32.3 % LA VOLUME:                              Normal Ranges: LA Vol A4C:       79.5 ml    (22+/-6mL/m2) LA Vol A2C:       78.2 ml LA Vol BP:        79.7 ml LA Vol Index A4C: 37.7ml/m2 LA Vol Index A2C: 37.1 ml/m2 LA Vol Index BP:  37.9 ml/m2 LA Volume Index:  37.8 ml/m2 LA Vol A4C:       76.0 ml LA Vol A2C:       73.9 ml RA VOLUME BY A/L METHOD:                       Normal Ranges: RA Area A4C: 17.8 cm2 M-MODE MEASUREMENTS:                  Normal Ranges: Ao Root: 3.00 cm (2.0-3.7cm) LAs:     5.08 cm (2.7-4.0cm) LV SYSTOLIC FUNCTION BY 2D PLANIMETRY (MOD):                     Normal Ranges: EF-A4C View: 63.4 % (>=55%) EF-A2C View: 63.8 % EF-Biplane:  62.5 % LV DIASTOLIC FUNCTION:                             Normal Ranges: MV Peak E:      1.33 m/s    (0.7-1.2 m/s) MV Peak A:      0.53 m/s    (0.42-0.7 m/s) E/A Ratio:      2.52        (1.0-2.2) MV e'           0.06 m/s    (>8.0) MV lateral e'   0.06 m/s MV medial e'    0.06 m/s MV A Dur:       110.73 msec E/e' Ratio:     22.09       (<8.0) PulmV Sys Mike:  53.97 cm/s PulmV Dugan Mike: 77.63 cm/s PulmV S/D Mike:  0.70 MITRAL VALVE:                 Normal Ranges: MV DT: 245 msec (150-240msec) MITRAL INSUFFICIENCY:                         Normal Ranges: MR VTI:     171.38 cm MR Vmax:    489.73 cm/s MR Volume:  20.07 ml MR Flow Rt: 57.36 ml/s  MR EROA:    0.12 cm2 AORTIC VALVE:                                    Normal Ranges: AoV Vmax:                1.88 m/s  (<=1.7m/s) AoV Peak P.1 mmHg (<20mmHg) AoV Mean P.1 mmHg  (1.7-11.5mmHg) LVOT Max Mike:            0.97 m/s  (<=1.1m/s) AoV VTI:                 46.22 cm  (18-25cm) LVOT VTI:                24.89 cm LVOT Diameter:           1.96 cm   (1.8-2.4cm) AoV Area, VTI:           1.62 cm2  (2.5-5.5cm2) AoV Area,Vmax:           1.56 cm2  (2.5-4.5cm2) AoV Dimensionless Index: 0.54  RIGHT VENTRICLE: RV Basal 3.80 cm RV Mid   2.80 cm RV Major 8.0 cm TAPSE:   18.0 mm RV s'    0.13 m/s TRICUSPID VALVE/RVSP:                             Normal Ranges: Peak TR Velocity: 3.04 m/s RV Syst Pressure: 39.9 mmHg (< 30mmHg) PULMONIC VALVE:                      Normal Ranges: PV Max Mike: 1.0 m/s  (0.6-0.9m/s) PV Max PG:  3.9 mmHg Pulmonary Veins: PulmV Dugan Mike: 77.63 cm/s PulmV S/D Mike:  0.70 PulmV Sys Mike:  53.97 cm/s  [id number] Nicholas Antonio MD Electronically signed on 2024 at 5:06:05 PM  ** Final **         Assessment/Plan   RHC in 2024: MEAN PA 30 MILD PULMONARY HTN MEAN RA 9 MM HG MEAN PCWP 23 MM HG CARDIAC OUTPUT 8.03 CI 3.79 NO SHUNTS     Echocardiogram from Dec 2024: LVEF 50%, moderate LVH with diastolic dysfunction, moderate basal septal hypertrophy, biatrial enlargement, mild AI, moderate MR/TR, moderate pulmonary hypertension, moderate pericardial effusion and no evidence of cardiac tamponade    1. Syncope  -I reviewed the EKG, no acute changes, ST elevation, or depression  -Telemetry reviewed, no arrhythmias, bradycardia, or pauses  -/95 when EMS arrived  -Echo as above  -Repeat limited echo to assess pericardial effusion  -Check orthostatic VS  -Outpt ambulatory monitor  -Monitor on tele    2. Chronic diastolic CHF, pleural effusion  -  -CXR worsened right pleural effusion  -oxygen  -Strict I & Os  -Daily weights  -2gm na diet  -Echo as above  -cont  dialysis  -planned outpt thoracentesis next week (1/3)  -Monitor on tele     3. Elevated troponins-non MI elevation  -Denies chest pain  -Does have shortness of breath  -Was supposed to have outpt LHC, unable to lay flat due to effusion  -Cont asa, plavix, statin, metoprolol  -Monitor on tele  -Plan for LHC as outpt after thoracentesis     4. Paroxysmal atrial fibrillation with RVR  -s/p Watchman in Aug 2023  -Currently SB/SR  -Cont amiodarone and metoprolol for rate control  -Monitor on tele     5. Hypertension  -stable  -2gm na diet  -cont meds  -monitor     6. Chronic kidney disease  -s/p nephrectomy  -Dialysis Tues/thurs/sat  -Nephrology following     7. Diabetes  -hgb a1c 8.7%  -ISS  -Accuchecks     8. Hypothyroidism  -Cont synthroid      CARLENE Villegas-CNP

## 2024-12-27 NOTE — CONSULTS
Reason For Consult  ESRD      NEPHROLOGY NEW CONSULT NOTE   Patient ID: Daphne Morris is a 69 y.o. female.     Reason for consult: ESRD-HD    No chief complaint on file.       HPI  Daphne Morris is a 69 y.o. female   - With past medical Hx as below   - Admitted for syncope of unclear etiology.  Missing dialysis  - Nephrology was consulted for ESRD management     Past Medical History:   Diagnosis Date    Anal fissure 10/12/2023    Anemia     ASHD (arteriosclerotic heart disease)     At risk for falls     Atrial fibrillation (Multi)     CHF (congestive heart failure)     CKD (chronic kidney disease)     COPD (chronic obstructive pulmonary disease) (Multi)     CVA (cerebral vascular accident) (Multi)     - right-sided weakness    Depression     Diabetes mellitus (Multi)     GERD (gastroesophageal reflux disease)     H/O pleural effusion     Hyperlipidemia     Hypertension     Hypothyroidism     Hypoxia     Impacted cerumen, left ear     Impacted cerumen of left ear    Noncompliance     Osteoarthritis     Perianal dermatitis 10/12/2023    Personal history of other diseases of the respiratory system     History of acute bronchitis    PVD (peripheral vascular disease) (CMS-HCC)     Renal carcinoma, right (Multi)     s/p partial nephrectomy 2021    Respiratory failure (Multi)     TIA (transient ischemic attack)     Vasculitis (CMS-HCC) 10/12/2023    Weakness       Past Surgical History:   Procedure Laterality Date    ANKLE SURGERY          CARDIAC CATHETERIZATION N/A 2024    Procedure: Right Heart Cath;  Surgeon: Nicholas Antonio MD;  Location: [facility];  Service: Cardiovascular;  Laterality: N/A;    CATARACT EXTRACTION Right     2019     SECTION, CLASSIC      MR CHEST ANGIO W AND WO IV CONTRAST  2023    MR CHEST ANGIO W AND WO IV CONTRAST 2023 [facility]    MR HEAD ANGIO WO IV CONTRAST  2021    MR HEAD ANGIO WO IV CONTRAST [facility]     NEPHRECTOMY  08/04/2021    SHOULDER SURGERY      2009      Family History   Problem Relation Name Age of Onset    Hypertension Mother      Diabetes Father      Heart disease Father      Cancer Sister      Cancer Brother      Diabetes Daughter      Asthma Daughter      Heart attack Daughter      Diabetes Maternal Grandfather      Heart disease Paternal Grandmother      Diabetes Other      Hypertension Other      COPD Other      Other (chronic lung disease) Other       Social History     Socioeconomic History    Marital status:      Spouse name: Not on file    Number of children: Not on file    Years of education: Not on file    Highest education level: Not on file   Occupational History    Not on file   Tobacco Use    Smoking status: Every Day     Current packs/day: 0.50     Average packs/day: 0.5 packs/day for 56.0 years (28.0 ttl pk-yrs)     Types: Cigarettes     Start date: 1/1/1969     Passive exposure: Never    Smokeless tobacco: Never   Vaping Use    Vaping status: Never Used   Substance and Sexual Activity    Alcohol use: Not Currently    Drug use: Not Currently     Types: Marijuana    Sexual activity: Not on file   Other Topics Concern    Not on file   Social History Narrative    Not on file     Social Drivers of Health     Financial Resource Strain: Low Risk  (12/26/2024)    Overall Financial Resource Strain (CARDIA)     Difficulty of Paying Living Expenses: Not hard at all   Food Insecurity: No Food Insecurity (12/26/2024)    Hunger Vital Sign     Worried About Running Out of Food in the Last Year: Never true     Ran Out of Food in the Last Year: Never true   Transportation Needs: No Transportation Needs (12/26/2024)    PRAPARE - Transportation     Lack of Transportation (Medical): No     Lack of Transportation (Non-Medical): No   Physical Activity: Inactive (6/27/2024)    Exercise Vital Sign     Days of Exercise per Week: 0 days     Minutes of Exercise per Session: 0 min   Stress: At Risk  (8/20/2024)    [facility]    [facility]ress     Stress: 2   Social Connections: Feeling Socially Isolated (8/23/2024)    OASIS : Social Isolation     Frequency of experiencing loneliness or isolation: Often   Intimate Partner Violence: Not At Risk (12/26/2024)    Humiliation, Afraid, Rape, and Kick questionnaire     Fear of Current or Ex-Partner: No     Emotionally Abused: No     Physically Abused: No     Sexually Abused: No   Housing Stability: Low Risk  (12/26/2024)    Housing Stability Vital Sign     Unable to Pay for Housing in the Last Year: No     Number of Times Moved in the Last Year: 0     Homeless in the Last Year: No     Allergies   Allergen Reactions    Bee Venom Protein (Honey Bee) Swelling    Citalopram Hives, Other, Rash and Nausea/vomiting     Facial breakout, blisters, and rash    Codeine Headache, Other and Itching     Migraines    Influenza Virus Vaccines Hives    Latex Other     blisters    Latex, Natural Rubber Other     blisters    Lisinopril Swelling and Nausea/vomiting     Facial swelling    Penicillins Swelling, Headache and Unknown    Adhesive Tape-Silicones Other     blisters    Amoxicillin Swelling and Rash    Doxycycline Rash and Unknown    Oseltamivir Rash and Nausea/vomiting    Phenyleph-Min Oil-Petrolatum Other    Phenyleph-Shark Oil-Glyc-Pet Rash    Phenylephrine Nausea/vomiting    Prednisone Other and Nausea/vomiting     Yeast infection    Tuberculin Ppd Unknown    Xylometazoline Unknown        Scheduled medications  acetaminophen, 975 mg, oral, BID  amiodarone, 200 mg, oral, Daily with breakfast  amLODIPine, 5 mg, oral, Daily  aspirin, 81 mg, oral, Daily  atorvastatin, 40 mg, oral, Daily  clopidogrel, 75 mg, oral, Daily  insulin glargine, 10 Units, subcutaneous, Nightly  insulin lispro, 0-10 Units, subcutaneous, TID AC  ipratropium-albuteroL, 3 mL, nebulization, TID  levothyroxine, 200 mcg, oral, Daily before breakfast  levothyroxine, 50 mcg, oral, Daily before  "breakfast  metoprolol succinate XL, 50 mg, oral, BID  nicotine, 1 patch, transdermal, Daily  pantoprazole, 40 mg, oral, BID  venlafaxine XR, 150 mg, oral, Daily      Continuous medications     PRN medications  PRN medications: albuterol, dextrose, dextrose, glucagon, glucagon, oxygen   Meds:   acetaminophen, 975 mg, BID  amiodarone, 200 mg, Daily with breakfast  amLODIPine, 5 mg, Daily  aspirin, 81 mg, Daily  atorvastatin, 40 mg, Daily  clopidogrel, 75 mg, Daily  insulin glargine, 10 Units, Nightly  insulin lispro, 0-10 Units, TID AC  ipratropium-albuteroL, 3 mL, TID  levothyroxine, 200 mcg, Daily before breakfast  levothyroxine, 50 mcg, Daily before breakfast  metoprolol succinate XL, 50 mg, BID  nicotine, 1 patch, Daily  pantoprazole, 40 mg, BID  venlafaxine XR, 150 mg, Daily         albuterol, 2.5 mg, q2h PRN  dextrose, 12.5 g, q15 min PRN  dextrose, 25 g, q15 min PRN  glucagon, 1 mg, q15 min PRN  glucagon, 1 mg, q15 min PRN  oxygen, , Continuous PRN - O2/gases        Heart Rate:  [59-66]   Temp:  [36.3 °C (97.3 °F)-36.8 °C (98.2 °F)]   Resp:  [17-20]   BP: (132-178)/(74-90)   Height:  [168.9 cm (5' 6.5\")]   Weight:  [71.2 kg (157 lb)]   SpO2:  [98 %-100 %]    Weight: 71.2 kg (157 lb)         General appearance: Awake and alert, oriented, . No distress  HEENT: supple, moist oral mucosa, no mouth ulcers  Neck: No JVD  Skin: no apparent rash  Heart: heart sounds 1 & 2 present and normal, no murmurs heard or friction rub  Lungs: Adequate air entry,  no wheezing/crackles  Abdomen: soft, non tender, no masses palpated, no flank tenderness  Extremities: No  edema, no joint swelling,  : deferred  Neuro: No FND, no asterixis   ACCESS: Right AV graft in place       Results from last 72 hours   Lab Units 12/26/24  0823   SODIUM mmol/L 133*   POTASSIUM mmol/L 4.9   CO2 mmol/L 30   BUN mg/dL 34*   CREATININE mg/dL 3.38*   CALCIUM mg/dL 8.7   ALBUMIN g/dL 3.7   GLUCOSE mg/dL 232*   WBC AUTO x10*3/uL 10.7        Results from " last 7 days   Lab Units 12/26/24  0823   WBC AUTO x10*3/uL 10.7   HEMOGLOBIN g/dL 14.2   HEMATOCRIT % 44.7   PLATELETS AUTO x10*3/uL 159         A/P  ESRD on hemodialysis-under care of Dr. Mitchell.  Missed dialysis yesterday.  Plan to repeat dialysis today as a make-up session.  She will be if due for dialysis tomorrow per her regular schedule-will be planned as an outpatient as she is getting discharged today        #ESRD: Tolerating HD per submitted orders, K, Ca, UF goal    # Anemia: HgB   Hemoglobin   Date Value Ref Range Status   12/26/2024 14.2 12.0 - 16.0 g/dL Final       # MBD: Not on binders or supplements  Calcium   Date Value Ref Range Status   12/26/2024 8.7 8.6 - 10.3 mg/dL Final     Phosphorus   Date Value Ref Range Status   05/20/2024 4.5 2.5 - 4.9 mg/dL Final     Comment:     The performance characteristics of phosphorus testing in heparinized plasma have been validated by the individual  laboratory site where testing is performed. Testing on heparinized plasma is not approved by the FDA; however, such approval is not necessary.     PTH   Date Value Ref Range Status   05/02/2023 131.2 (H) 18.5 - 88.0 pg/mL Final       #Hypertension: BP acceptably controlled-continue home medications    #Volume status: Clinically euvolemic/hypervolemic, continue UF as tolerated/aggressive UF    # Access: no issues         Renal Diet   Daily renal MVI   Continue 3 x per week hemodialysis; SW to ensure availability of o/p HD chair at discharge      Please communicate any antibiotic needs prior to discharge directly with the dialysis unit    Will continue to follow    Jonh Pena MD        Assessment & Plan  Syncope and collapse      I spent  minutes in the professional and overall care of this patient.      John Pena MD

## 2024-12-27 NOTE — PROGRESS NOTES
12/27/24 0830   Discharge Planning   Living Arrangements Alone   Support Systems Family members;Friends/neighbors   Assistance Needed Alert and oriented x 3, Independent with ADL's, Doesn't drive(Family members and friends transport to appointments and grocery shopping), Rollator, Grab bars, Patient does sponge baths, Home O2 at 3 liters at HS([facility]), Patient receives dialysis at [facility] T-Th-Sat @ 7am,(nephew transports)   Type of Residence Private residence   Number of Stairs to Enter Residence 4   Number of Stairs Within Residence 0   Do you have animals or pets at home? Yes   Type of Animals or Pets 1 cat   Who is requesting discharge planning? Provider   Home or Post Acute Services In home services;Post acute facilities (Rehab/SNF/etc)   Type of Post Acute Facility Services Skilled nursing   Type of Home Care Services Home nursing visits;Home OT;Home PT   Expected Discharge Disposition Home   Does the patient need discharge transport arranged? Yes   RoundTrip coordination needed? Yes   Has discharge transport been arranged? No   Patient Choice   Provider Choice list and CMS website (https://medicare.gov/care-compare#search) for post-acute Quality and Resource Measure Data were provided and reviewed with: Patient   Patient / Family choosing to utilize agency / facility established prior to hospitalization No   Stroke Family Assessment   Stroke Family Assessment Needed No   Intensity of Service   Intensity of Service 0-30 min

## 2024-12-27 NOTE — PROGRESS NOTES
Patient: Daphne Morris  Room/bed: 220/220-B  Admitted on: 12/26/2024    Age: 69 y.o.   Gender: female  Code Status:  Full Code   Admitting Dx: Syncope and collapse    MRN: [id number]  PCP: GREGORIA King       Subjective   Feels better today. Slept well. Now wants to go home from here    Objective    Physical Exam  HENT:      Head: Normocephalic.      Ears:      Comments: [place]     Nose: Nose normal.      Mouth/Throat:      Mouth: Mucous membranes are dry.   Eyes:      Extraocular Movements: Extraocular movements intact.   Cardiovascular:      Pulses: Normal pulses.      Comments: Regular, 1/6 short systolic murmur  Pulmonary:      Comments: Decreased breath sounds in bases.   No wheezing or rhonchi. Occasional deep cough noted  Abdominal:      General: Bowel sounds are normal.      Palpations: Abdomen is soft.   Musculoskeletal:      Comments: Good bruit in RUE  No edema   Skin:     General: Skin is dry.   Neurological:      Mental Status: She is alert. Mental status is at baseline.   Psychiatric:         Mood and Affect: Mood normal.         Behavior: Behavior normal.        Temp:  [36.3 °C (97.3 °F)-36.8 °C (98.2 °F)] 36.8 °C (98.2 °F)  Heart Rate:  [61-69] 61  Resp:  [15-23] 18  BP: (132-184)/() 158/90    Vitals:    12/26/24 1524   Weight: 71.2 kg (157 lb)           I/Os    Intake/Output Summary (Last 24 hours) at 12/27/2024 0920  Last data filed at 12/27/2024 0249  Gross per 24 hour   Intake 340 ml   Output 30 ml   Net 310 ml       Labs:   Results from last 72 hours   Lab Units 12/26/24  0823   SODIUM mmol/L 133*   POTASSIUM mmol/L 4.9   CHLORIDE mmol/L 95*   CO2 mmol/L 30   BUN mg/dL 34*   CREATININE mg/dL 3.38*   GLUCOSE mg/dL 232*   CALCIUM mg/dL 8.7   ANION GAP mmol/L 13   EGFR mL/min/1.73m*2 14*      Results from last 72 hours   Lab Units 12/26/24  0823   WBC AUTO x10*3/uL 10.7   HEMOGLOBIN g/dL 14.2   HEMATOCRIT % 44.7   PLATELETS AUTO x10*3/uL 159   NEUTROS PCT AUTO % 55.7   LYMPHS PCT AUTO  "% 34.9   MONOS PCT AUTO % 6.1   EOS PCT AUTO % 1.9      Lab Results   Component Value Date    CALCIUM 8.7 12/26/2024    PHOS 4.5 05/20/2024      Lab Results   Component Value Date    CRP 5.41 (H) 05/15/2024      [unfilled]     Micro/ID:   No results found for the last 90 days.                   No lab exists for component: \"AGALPCRNB\"   .ID  Lab Results   Component Value Date    URINECULTURE No significant growth 04/29/2024    BLOODCULT No growth at 4 days -  FINAL REPORT 05/14/2024    BLOODCULT No growth at 4 days -  FINAL REPORT 05/14/2024       Images:  Vascular US Carotid Artery Duplex Bilateral  Preliminary Cardiology Report            [place] [address]   Tel [phone] and Fax [phone]           Preliminary Vascular Lab Report     VASC US CAROTID ARTERY DUPLEX BILATERAL       Patient Name:      MOE OTERO ZULMA Reading Physician: [id number] Robert Henderson MD,                                                        RPVI  Study Date:        12/27/2024      Ordering           [id number] [address]IA [place]                                     Physician:  MRN/PID:           [id number]        Technologist:      Porsche Marin T  Accession#:        [id number]    Technologist 2:  Date of Birth/Age: 1955       Encounter#:        [id number]  Gender:            F  Admission Status:  Inpatient       Location           [facility]                                     Performed:       Diagnosis/ICD: Syncope and collapse-R55  Procedure/CPT: 02260 Cerebrovascular Carotid Duplex scan complete       PRELIMINARY CONCLUSIONS:  Right Carotid: Findings are consistent with less than 50% stenosis of the right proximal internal carotid artery. Right external carotid artery appears patent with no evidence of stenosis. The right vertebral artery is patent with antegrade flow. No evidence of hemodynamically significant stenosis in the right subclavian artery. Dampened waveform " noted in the right subclavian artery may be suggestive of a more proximal stenosis.  Left Carotid: Findings are consistent with less than 50% stenosis of the left proximal internal carotid artery. Laminar flow seen by color Doppler. Left external carotid artery appears patent with no evidence of stenosis. The left vertebral artery is patent with antegrade flow. There is a >50% stenosis noted in the left subclavian artery.     Additional Findings:  Technically difficult study due to patient respirations.       Imaging & Doppler Findings:  Right Plaque Morph: The proximal right internal carotid artery demonstrates heterogenous and calcified plaque. The proximal right external carotid artery demonstrates heterogenous and calcified plaque. The mid right common carotid artery demonstrates heterogenous and calcified plaque. The distal right common carotid artery demonstrates heterogenous and calcified plaque.  Left Plaque Morph: The proximal left internal carotid artery demonstrates heterogenous and calcified plaque. The proximal left external carotid artery demonstrates heterogenous and calcified plaque. The mid left common carotid artery demonstrates heterogenous and calcified plaque. The distal left common carotid artery demonstrates heterogenous and calcified plaque.      Right                        Left    PSV      EDV                PSV      EDV  71 cm/s            CCA P    75 cm/s  83 cm/s            CCA D    78 cm/s  123 cm/s 24 cm/s   ICA P    117 cm/s 25 cm/s  145 cm/s           ICA M    109 cm/s  108 cm/s           ICA D    89 cm/s  110 cm/s            ECA     131 cm/s  66 cm/s          Vertebral  50 cm/s  191 cm/s         Subclavian 270 cm/s                     Right Left  ICA/CCA Ratio  1.5  1.5            VASCULAR PRELIMINARY REPORT  completed by Porsche Marin RVT on 12/27/2024 at 8:29:51 AM       ** Final **       Meds    Scheduled medications  acetaminophen, 975 mg, oral, BID  amiodarone, 200 mg,  oral, Daily with breakfast  amLODIPine, 5 mg, oral, Daily  aspirin, 81 mg, oral, Daily  atorvastatin, 40 mg, oral, Daily  clopidogrel, 75 mg, oral, Daily  insulin glargine, 10 Units, subcutaneous, Nightly  insulin lispro, 0-10 Units, subcutaneous, TID AC  ipratropium-albuteroL, 3 mL, nebulization, TID  levothyroxine, 200 mcg, oral, Daily before breakfast  levothyroxine, 50 mcg, oral, Daily before breakfast  metoprolol succinate XL, 50 mg, oral, BID  nicotine, 1 patch, transdermal, Daily  pantoprazole, 40 mg, oral, BID  venlafaxine XR, 150 mg, oral, Daily      Continuous medications     PRN medications  PRN medications: albuterol, dextrose, dextrose, glucagon, glucagon, oxygen     Assessment and Plan    Daphne Morris is a 69 y.o. female   Syncope. Unclear etiology. Tele has been NSR with pvc's, nothing sustained. Carotid us is ok, less than 50% stenosis bilaterally. Much more alert this am. ? TIA possibly, doubt seizure, pe. Does not appear vagal as she was just sitting in chair when this happened. Will have cards to see for input  ESRD. She needs dialysis today, missed yesterday  Hypertension, continue usual meds  Hypothyroid, on supplement  Depression  Tobacco abuse. Patch ordered. Encourage cessation  DM. Glyco is 8.7. monitor  Therapy evals.   Likely home later today or in am      Alejandra Marshall MD

## 2024-12-27 NOTE — PRE-PROCEDURE NOTE
Report from Sending RN:    Report From: Yola Krause RNx  Recent Surgery of Procedure: No  Baseline Level of Consciousness (LOC): A&Ox 4  Oxygen Use: Yes  Type: 3L  Diabetic: Yes  Last BP Med Given Day of Dialysis: See EMR  Last Pain Med Given: See EMR  Lab Tests to be Obtained with Dialysis: No  Blood Transfusion to be Given During Dialysis: No  Available IV Access: Yes  Medications to be Administered During Dialysis: No  Continuous IV Infusion Running: No  Restraints on Currently or in the Last 24 Hours: No  Hand-Off Communication: Stable  Dialysis Catheter Dressing: N/A  Last Dressing Change: N/a

## 2024-12-28 ENCOUNTER — APPOINTMENT (OUTPATIENT)
Dept: DIALYSIS | Facility: HOSPITAL | Age: 69
End: 2024-12-28
Payer: MEDICARE

## 2024-12-28 VITALS
HEART RATE: 64 BPM | DIASTOLIC BLOOD PRESSURE: 66 MMHG | RESPIRATION RATE: 18 BRPM | SYSTOLIC BLOOD PRESSURE: 137 MMHG | HEIGHT: 67 IN | WEIGHT: 157 LBS | OXYGEN SATURATION: 90 % | TEMPERATURE: 98.1 F | BODY MASS INDEX: 24.64 KG/M2

## 2024-12-28 PROBLEM — R55 SYNCOPE AND COLLAPSE: Status: RESOLVED | Noted: 2024-12-26 | Resolved: 2024-12-28

## 2024-12-28 LAB
ANION GAP SERPL CALC-SCNC: 10 MMOL/L (ref 10–20)
ATRIAL RATE: 72 BPM
BUN SERPL-MCNC: 21 MG/DL (ref 6–23)
CALCIUM SERPL-MCNC: 7.6 MG/DL (ref 8.6–10.3)
CHLORIDE SERPL-SCNC: 95 MMOL/L (ref 98–107)
CO2 SERPL-SCNC: 33 MMOL/L (ref 21–32)
CREAT SERPL-MCNC: 2.61 MG/DL (ref 0.5–1.05)
EGFRCR SERPLBLD CKD-EPI 2021: 19 ML/MIN/1.73M*2
GLUCOSE BLD MANUAL STRIP-MCNC: 137 MG/DL (ref 74–99)
GLUCOSE BLD MANUAL STRIP-MCNC: 226 MG/DL (ref 74–99)
GLUCOSE SERPL-MCNC: 147 MG/DL (ref 74–99)
P AXIS: 39 DEGREES
P OFFSET: 170 MS
P ONSET: 123 MS
POTASSIUM SERPL-SCNC: 4.1 MMOL/L (ref 3.5–5.3)
PR INTERVAL: 186 MS
Q ONSET: 216 MS
QRS COUNT: 12 BEATS
QRS DURATION: 120 MS
QT INTERVAL: 452 MS
QTC CALCULATION(BAZETT): 494 MS
QTC FREDERICIA: 480 MS
R AXIS: -52 DEGREES
SODIUM SERPL-SCNC: 134 MMOL/L (ref 136–145)
T AXIS: 102 DEGREES
T OFFSET: 442 MS
VENTRICULAR RATE: 72 BPM

## 2024-12-28 PROCEDURE — 2500000001 HC RX 250 WO HCPCS SELF ADMINISTERED DRUGS (ALT 637 FOR MEDICARE OP): Performed by: INTERNAL MEDICINE

## 2024-12-28 PROCEDURE — 82947 ASSAY GLUCOSE BLOOD QUANT: CPT | Mod: 59

## 2024-12-28 PROCEDURE — G0378 HOSPITAL OBSERVATION PER HR: HCPCS

## 2024-12-28 PROCEDURE — 2500000002 HC RX 250 W HCPCS SELF ADMINISTERED DRUGS (ALT 637 FOR MEDICARE OP, ALT 636 FOR OP/ED): Performed by: INTERNAL MEDICINE

## 2024-12-28 PROCEDURE — 80048 BASIC METABOLIC PNL TOTAL CA: CPT | Performed by: INTERNAL MEDICINE

## 2024-12-28 PROCEDURE — 94640 AIRWAY INHALATION TREATMENT: CPT

## 2024-12-28 PROCEDURE — 97161 PT EVAL LOW COMPLEX 20 MIN: CPT | Mod: GP

## 2024-12-28 PROCEDURE — 99238 HOSP IP/OBS DSCHRG MGMT 30/<: CPT | Performed by: INTERNAL MEDICINE

## 2024-12-28 PROCEDURE — 97165 OT EVAL LOW COMPLEX 30 MIN: CPT | Mod: GO

## 2024-12-28 PROCEDURE — S4991 NICOTINE PATCH NONLEGEND: HCPCS | Performed by: INTERNAL MEDICINE

## 2024-12-28 PROCEDURE — 94760 N-INVAS EAR/PLS OXIMETRY 1: CPT

## 2024-12-28 RX ADMIN — AMIODARONE HYDROCHLORIDE 200 MG: 200 TABLET ORAL at 08:51

## 2024-12-28 RX ADMIN — NICOTINE 1 PATCH: 14 PATCH, EXTENDED RELEASE TRANSDERMAL at 08:51

## 2024-12-28 RX ADMIN — ACETAMINOPHEN 975 MG: 325 TABLET, FILM COATED ORAL at 08:50

## 2024-12-28 RX ADMIN — AMLODIPINE BESYLATE 5 MG: 5 TABLET ORAL at 08:51

## 2024-12-28 RX ADMIN — LEVOTHYROXINE SODIUM 200 MCG: 100 TABLET ORAL at 06:10

## 2024-12-28 RX ADMIN — ASPIRIN 81 MG: 81 TABLET, COATED ORAL at 08:51

## 2024-12-28 RX ADMIN — PANTOPRAZOLE SODIUM 40 MG: 40 TABLET, DELAYED RELEASE ORAL at 08:51

## 2024-12-28 RX ADMIN — INSULIN LISPRO 4 UNITS: 100 INJECTION, SOLUTION INTRAVENOUS; SUBCUTANEOUS at 11:49

## 2024-12-28 RX ADMIN — LEVOTHYROXINE SODIUM 50 MCG: 50 TABLET ORAL at 06:11

## 2024-12-28 RX ADMIN — ATORVASTATIN CALCIUM 40 MG: 40 TABLET, FILM COATED ORAL at 08:51

## 2024-12-28 RX ADMIN — CLOPIDOGREL 75 MG: 75 TABLET ORAL at 08:51

## 2024-12-28 RX ADMIN — METOPROLOL SUCCINATE 50 MG: 50 TABLET, EXTENDED RELEASE ORAL at 08:51

## 2024-12-28 RX ADMIN — VENLAFAXINE HYDROCHLORIDE 150 MG: 75 CAPSULE, EXTENDED RELEASE ORAL at 08:51

## 2024-12-28 RX ADMIN — IPRATROPIUM BROMIDE AND ALBUTEROL SULFATE 3 ML: 2.5; .5 SOLUTION RESPIRATORY (INHALATION) at 08:36

## 2024-12-28 ASSESSMENT — COGNITIVE AND FUNCTIONAL STATUS - GENERAL
WALKING IN HOSPITAL ROOM: A LOT
STANDING UP FROM CHAIR USING ARMS: A LITTLE
MOVING TO AND FROM BED TO CHAIR: A LITTLE
PERSONAL GROOMING: A LITTLE
CLIMB 3 TO 5 STEPS WITH RAILING: A LITTLE
MOBILITY SCORE: 13
DAILY ACTIVITIY SCORE: 18
CLIMB 3 TO 5 STEPS WITH RAILING: TOTAL
STANDING UP FROM CHAIR USING ARMS: A LOT
DRESSING REGULAR LOWER BODY CLOTHING: A LOT
HELP NEEDED FOR BATHING: A LITTLE
MOVING TO AND FROM BED TO CHAIR: A LOT
MOVING FROM LYING ON BACK TO SITTING ON SIDE OF FLAT BED WITH BEDRAILS: A LITTLE
DRESSING REGULAR UPPER BODY CLOTHING: A LITTLE
TOILETING: A LITTLE
DRESSING REGULAR LOWER BODY CLOTHING: A LITTLE
TOILETING: A LITTLE
MOBILITY SCORE: 20
PERSONAL GROOMING: A LITTLE
HELP NEEDED FOR BATHING: A LITTLE
TURNING FROM BACK TO SIDE WHILE IN FLAT BAD: A LITTLE
WALKING IN HOSPITAL ROOM: A LITTLE
DAILY ACTIVITIY SCORE: 20

## 2024-12-28 ASSESSMENT — PAIN - FUNCTIONAL ASSESSMENT
PAIN_FUNCTIONAL_ASSESSMENT: 0-10

## 2024-12-28 ASSESSMENT — PAIN SCALES - GENERAL
PAINLEVEL_OUTOF10: 0 - NO PAIN

## 2024-12-28 ASSESSMENT — ACTIVITIES OF DAILY LIVING (ADL)
ADL_ASSISTANCE: INDEPENDENT
ADLS_ADDRESSED: YES
BATHING_ASSISTANCE: STAND BY
ADL_ASSISTANCE: INDEPENDENT

## 2024-12-28 NOTE — PROGRESS NOTES
12/28/24 1031   Discharge Planning   Living Arrangements Alone   Support Systems Family members;Friends/neighbors   Assistance Needed Alert and oriented x 3, Independent with ADL's, Doesn't drive(Family members and friends transport to appointments and grocery shopping), Rollator, Grab bars, Patient does sponge baths, Home O2 at 3 liters at HS([facility]), Patient receives dialysis at [facility] T-Th-Sat @ 7am,(nephew transports)   Type of Residence Private residence   Number of Stairs to Enter Residence 4   Number of Stairs Within Residence 0   Do you have animals or pets at home? Yes   Type of Animals or Pets 1 cat   Who is requesting discharge planning? Provider   Expected Discharge Disposition Home  (patient denies need for HC services- per chart review she is active with  house calls and will be moving to [place] at the beginning of the year.)     12/28/2024 1032: Patient denies need for HC services at bedside- reports her nephew would pick her up. Voicemail left for nephew Silver at phone number listed in chart.

## 2024-12-28 NOTE — PROGRESS NOTES
Occupational Therapy    Evaluation    Patient Name: Daphne Morris  MRN: [id number]  Department: [address] OBS  [facility]  Today's Date: 12/28/2024  Time Calculation  Start Time: 0903  Stop Time: 0916  Time Calculation (min): 13 min    Assessment  IP OT Assessment  OT Assessment: Pt presents with decreased ADL performance and endurance. Recommend home OT to maximize functional mobility and independence/safety at home.  Prognosis: Good  Barriers to Discharge Home: No anticipated barriers  Evaluation/Treatment Tolerance: Patient tolerated treatment well  Medical Staff Made Aware: Yes  End of Session Communication: Bedside nurse  End of Session Patient Position: Up in chair, Alarm on  Plan:  Treatment Interventions: ADL retraining, Functional transfer training, Endurance training  OT Frequency: OT eval only  OT Discharge Recommendations: Low intensity level of continued care  Equipment Recommended upon Discharge: Wheeled walker  OT Recommended Transfer Status: Stand by assist  OT - OK to Discharge: Yes (per OT POC)    Subjective   Current Problem:  1. Syncope and collapse  Vascular US Carotid Artery Duplex Bilateral    Vascular US Carotid Artery Duplex Bilateral    Transthoracic Echo Limited    Transthoracic Echo Limited    CANCELED: Transthoracic Echo (TTE) Complete    CANCELED: Transthoracic Echo (TTE) Complete      2. Other pericardial effusion (noninflammatory) ([facility]-HCC)  Transthoracic Echo Limited        General:  General  Reason for Referral: 70 yo female referred to OT for syncope/collapse, impaired ADLs/mobility  Referred By: Alejandra Marshall  Past Medical History Relevant to Rehab: ESRD, dialysis dependent for about a year. Tues thurs sat schedule  Hypertension  CAD/PVD  Renal cell cancer  Hyperlipidemia  Diabetes  Atrial fib  COPD, on 3 liters, mostly at night  Hypothyroid  Depression  Visual impairment  Anemia  CVA, multiple. Right sided weakness  Co-Treatment: PT  Co-Treatment Reason: Maximize patients  functional mobility and outcomes  Prior to Session Communication: Bedside nurse  Patient Position Received: Bed, 3 rail up, Alarm on  General Comment: Patient pleasant, cooperative and agreeable to therapy assessment  Precautions:  Medical Precautions: Fall precautions (tele, purewick)    Vital Sign (Past 2hrs)        Date/Time Vitals Session Patient Position Pulse Resp SpO2 BP MAP (mmHg)    12/28/24 0758 --  --  59  18  91 %  137/66  90     12/28/24 0836 --  --  --  --  90 %  --  --     12/28/24 0900 --  --  64  --  --  --  --                        Pain:  Pain Assessment  Pain Assessment: 0-10  0-10 (Numeric) Pain Score: 0 - No pain    Objective   Cognition:  Overall Cognitive Status: Within Functional Limits  Orientation Level: Oriented X4           Home Living:  Type of Home: Mobile home  Lives With: Alone  Home Adaptive Equipment: Walker rolling or standard (rollator)  Home Layout: One level  Home Access: Stairs to enter with rails  Entrance Stairs-Rails: Right  Entrance Stairs-Number of Steps: 4  Bathroom Shower/Tub:  (spongebathes PTA)   Prior Function:  Level of [place]: Independent with ADLs and functional transfers, Independent with homemaking with ambulation  Receives Help From: Friends, Neighbor  ADL Assistance: Independent  Homemaking Assistance: Independent  Ambulatory Assistance: Independent (with rollator)  IADL History:  Mode of Transportation: Family, Friends  ADL:  Eating Assistance: Independent  Grooming Assistance: Independent  Bathing Assistance: Stand by  UE Dressing Assistance: Independent  LE Dressing Assistance: Minimal  Toileting Assistance with Device: Stand by  Functional Assistance: Stand by  ADL Comments: Able to don socks independently with figure 4 position; able to thread feet into pants but required assist to arrange over backside in stance. Other ADL performances anticipated.  Activity Tolerance:  Endurance: Tolerates less than 10 min exercise, no significant change in vital  signs  Bed Mobility/Transfers: Bed Mobility  Bed Mobility: Yes  Bed Mobility 1  Bed Mobility 1: Supine to sitting  Level of Assistance 1: Modified independent  Bed Mobility Comments 1: use of bed rail    Transfers  Transfer: Yes  Transfer 1  Transfer From 1: Sit to  Transfer to 1: Stand  Technique 1: Sit to stand, Stand to sit  Transfer Device 1: Walker  Transfer Level of Assistance 1: Close supervision      Functional Mobility:  Functional Mobility  Functional Mobility Performed: Yes  Functional Mobility 1  Surface 1: Level tile  Device 1: Rolling walker  Assistance 1: Contact guard  Comments 1: Ambulated household distance in room/hallway with good balance, fair endurance  Sitting Balance:  Static Sitting Balance  Static Sitting-Balance Support: Feet supported  Static Sitting-Level of Assistance: Independent  Standing Balance:  Static Standing Balance  Static Standing-Balance Support: Bilateral upper extremity supported  Static Standing-Level of Assistance: Close supervision     IADL's:   Mode of Transportation: Family, Friends     Strength:  Strength Comments: BUE functionally 4/5, WFL     Coordination:  Movements are Fluid and Coordinated: Yes   Hand Function:  Hand Function  Gross Grasp: Impaired  Coordination: Impaired  Extremities: RUE   RUE : Within Functional Limits and LUE   LUE: Within Functional Limits    Outcome Measures: [facility] Daily Activity  Putting on and taking off regular lower body clothing: A little  Bathing (including washing, rinsing, drying): A little  Putting on and taking off regular upper body clothing: None  Toileting, which includes using toilet, bedpan or urinal: A little  Taking care of personal grooming such as brushing teeth: A little  Eating Meals: None  Daily Activity - Total Score: 20      Education Documentation  Body Mechanics, taught by Trevor Cruz OT at 12/28/2024  9:55 AM.  Learner: Patient  Readiness: Acceptance  Method: Explanation  Response: Verbalizes  Understanding    Precautions, taught by Trevor Cruz OT at 12/28/2024  9:55 AM.  Learner: Patient  Readiness: Acceptance  Method: Explanation  Response: Verbalizes Understanding    ADL Training, taught by Trevor Cruz OT at 12/28/2024  9:55 AM.  Learner: Patient  Readiness: Acceptance  Method: Explanation  Response: Verbalizes Understanding    Education Comments  No comments found.

## 2024-12-28 NOTE — NURSING NOTE
1245- dc instructions given and explained to patient, POA on phone and bedside RN gave them an update as well. Pt so have Dialysis Monday. IV and tele removed from patient. No other needs voiced.  1258-Dialysis tech wondering if patient needs a treatment since there are orders placed. Called and spoke with Dr. Gaspar states it is okay for patient to leave without treatment today since she had  Dialysis yesterday.

## 2024-12-28 NOTE — NURSING NOTE
Assumed care of patient. Patient is lying in bed and resting on 4L nc. Call light in reach. Patient is sinus anna on monitor @ 59bpm. Patient denies pain.

## 2024-12-28 NOTE — PROGRESS NOTES
Physical Therapy    Physical Therapy Evaluation    Patient Name: Daphne Morris  MRN: [id number]  Department: [address]  [facility]  Today's Date: 12/28/2024   Time Calculation  Start Time: 0902  Stop Time: 0916  Time Calculation (min): 14 min    Assessment/Plan   PT Assessment  PT Assessment Results: Decreased strength, Decreased endurance, Impaired balance, Decreased mobility  Rehab Prognosis: Good  Barriers to Discharge Home: No anticipated barriers  Evaluation/Treatment Tolerance: Patient tolerated treatment well  Medical Staff Made Aware: Yes  Strengths: Ability to acquire knowledge, Housing layout, Insight into problems, Support of Caregivers  Barriers to Participation: Comorbidities  End of Session Communication: Bedside nurse  Assessment Comment: Patient tolerates mobility well, reports recent falls and discussed importance of R DF awareness as potential barrier. Vision also a concern for fall prevention. Patient would continue to benefit from LOW intensity PT intervention and continued family support.  End of Session Patient Position: Up in chair, Alarm on (all needs within reach)     PT Plan  Treatment/Interventions: Bed mobility, Transfer training, Gait training, Stair training, Balance training, Strengthening, Endurance training  PT Plan: Ongoing PT  PT Frequency: 2 times per week  PT Discharge Recommendations: Low intensity level of continued care  Equipment Recommended upon Discharge: Wheeled walker (owns)  PT Recommended Transfer Status: Assist x1  PT - OK to Discharge: Yes (per pT POC)    Subjective   General Visit Information:  General  Reason for Referral: Impaired functional mobility; LOC during HD  Referred By: Alejandra Marshall  Past Medical History Relevant to Rehab: ESRD, dialysis dependent for about a year. Tues thurs sat schedule  Hypertension  CAD/PVD  Renal cell cancer  Hyperlipidemia  Diabetes  Atrial fib  COPD, on 3 liters, mostly at night  Hypothyroid  Depression  Visual impairment   Anemia  CVA, multiple. Right sided weakness  Family/Caregiver Present: No  Co-Treatment: OT  Co-Treatment Reason: Maximize patients functional mobility and outcomes  Prior to Session Communication: Bedside nurse  Patient Position Received: Bed, 3 rail up, Alarm on  General Comment: Patient pleasant, cooperative and agreeable to therapy assessment  Home Living:  Home Living  Type of Home: Mobile home  Lives With: Alone  Home Adaptive Equipment: Walker rolling or standard (rollator)  Home Layout: One level  Home Access: Stairs to enter with rails  Entrance Stairs-Rails:  (1 rail)  Entrance Stairs-Number of Steps: 4  Bathroom Shower/Tub:  (sponge bathes)  Prior Level of Function:  Prior Function Per Pt/Caregiver Report  Level of [place]: Independent with ADLs and functional transfers, Independent with homemaking with ambulation  Receives Help From: Friends, Neighbor  ADL Assistance: Independent  Homemaking Assistance: Independent  Ambulatory Assistance: Independent  Precautions:  Precautions  Medical Precautions: Fall precautions (tele, purewick)     Vital Signs (Past 2hrs)        Date/Time Vitals Session Patient Position Pulse Resp SpO2 BP MAP (mmHg)    12/28/24 0758 --  --  59  18  91 %  137/66  90     12/28/24 0836 --  --  --  --  90 %  --  --     12/28/24 0900 --  --  64  --  --  --  --                         Objective   Pain:  Pain Assessment  Pain Assessment: 0-10  0-10 (Numeric) Pain Score: 0 - No pain  Cognition:  Cognition  Overall Cognitive Status: Within Functional Limits  Orientation Level: Oriented X4    General Assessments:    Activity Tolerance  Endurance: Tolerates less than 10 min exercise, no significant change in vital signs    Sensation  Light Touch: No apparent deficits    Strength  Strength Comments: Functionally: R LE 3+/5, L LE 4/5    Coordination  Movements are Fluid and Coordinated: Yes    Postural Control  Postural Control: Within Functional Limits    Static Sitting Balance  Static  Sitting-Balance Support: No upper extremity supported, Feet supported  Static Sitting-Level of Assistance: Independent  Dynamic Sitting Balance  Dynamic Sitting-Balance Support: No upper extremity supported, Feet supported  Dynamic Sitting-Level of Assistance: Independent  Dynamic Sitting-Balance: Forward lean    Static Standing Balance  Static Standing-Balance Support: Bilateral upper extremity supported  Static Standing-Level of Assistance: Close supervision  Functional Assessments:  ADL  ADL's Addressed: Yes (independently dons socks at EOB in figure four position)    Bed Mobility  Bed Mobility: Yes  Bed Mobility 1  Bed Mobility 1: Supine to sitting  Level of Assistance 1: Independent (use of rail)    Transfers  Transfer: Yes  Transfer 1  Transfer From 1: Sit to, Stand to  Transfer to 1: Sit, Stand  Technique 1: Sit to stand, Stand to sit  Transfer Device 1: Walker  Transfer Level of Assistance 1: Contact guard    Ambulation/Gait Training  Ambulation/Gait Training Performed: Yes  Ambulation/Gait Training 1  Surface 1: Level tile  Device 1: Rolling walker  Assistance 1: Contact guard  Quality of Gait 1: Inconsistent stride length, Decreased step length (decreased R DF)  Comments/Distance (ft) 1: 40'    Outcome Measures:  [facility] Basic Mobility  Turning from your back to your side while in a flat bed without using bedrails: None  Moving from lying on your back to sitting on the side of a flat bed without using bedrails: None  Moving to and from bed to chair (including a wheelchair): A little  Standing up from a chair using your arms (e.g. wheelchair or bedside chair): A little  To walk in hospital room: A little  Climbing 3-5 steps with railing: A little  Basic Mobility - Total Score: 20    Encounter Problems       Encounter Problems (Active)       Balance       STG - Maintains dynamic standing balance without upper extremity support x 5' with dual task supervision   (Progressing)       Start:  12/28/24    Expected  End:  01/11/25               Mobility       STG - Patient will ambulate with walker 75' mod I  (Progressing)       Start:  12/28/24    Expected End:  01/11/25            STG - Patient will ascend and descend four stairs with rail mod I  (Progressing)       Start:  12/28/24    Expected End:  01/11/25               PT Transfers       STG - Patient will transfer sit to and from stand mod I  (Progressing)       Start:  12/28/24    Expected End:  01/11/25                   Education Documentation  Precautions, taught by Kimberly Chamberlain, PT at 12/28/2024  9:44 AM.  Learner: Patient  Readiness: Acceptance  Method: Explanation  Response: Verbalizes Understanding    Body Mechanics, taught by Kimberly Chamberlain, PT at 12/28/2024  9:44 AM.  Learner: Patient  Readiness: Acceptance  Method: Explanation  Response: Verbalizes Understanding    Education Comments  No comments found.

## 2024-12-28 NOTE — DISCHARGE SUMMARY
"Discharge Diagnosis  Syncope and collapse    Issues Requiring Follow-Up  Pleural effusion  dialysis    Discharge Meds     Medication List      CHANGE how you take these medications     HumaLOG KwikPen Insulin 100 unit/mL injection; Generic drug: insulin   lispro; up to 15 units Subcutaneous three times a day per sliding scaleup   to 15 units Subcutaneous three times a day per sliding scale; What   changed: additional instructions     CONTINUE taking these medications     acetaminophen 500 mg tablet; Commonly known as: Tylenol   albuterol 90 mcg/actuation inhaler; INHALE 2 PUFFS BY MOUTH EVERY 4   HOURS AS NEEDED   amiodarone 200 mg tablet; Commonly known as: Pacerone; TAKE 1 TABLET BY   MOUTH ONCE DAILY WITH BREAKFAST.   amLODIPine 5 mg tablet; Commonly known as: Norvasc   aspirin 81 mg EC tablet   atorvastatin 40 mg tablet; Commonly known as: Lipitor; TAKE ONE TABLET   BY MOUTH EVERY DAY   BD Calista 2nd Gen Pen Needle 32 gauge x 5/32\" needle; Generic drug: pen   needle, diabetic; USE SUBCUTANEOUSLY AS DIRECTED TWICE DAILY   clopidogrel 75 mg tablet; Commonly known as: Plavix   ferrous gluconate 324 (38 Fe) mg tablet; Commonly known as: Fergon; Take   1 tablet (324 mg) by mouth once daily with breakfast.   FreeStyle Shakir 14 Day Sensor kit; Generic drug: flash glucose sensor   kit; USE AS DIRECTED TO CHECK SUGARS 3 TO 4 TIMES DAILY   FreeStyle Shakir 2 [place] misc; Generic drug: flash glucose scanning   reader; Use as instructed   FreeStyle Shakir 3 [place] misc; Generic drug: blood-glucose   meter,continuous; Use as instructed   FreeStyle Shakir 3 Sensor device; Generic drug: blood-glucose sensor;   Change sensor Q 14 days   ipratropium-albuteroL 0.5-2.5 mg/3 mL nebulizer solution; Commonly known   as: Duo-Neb; INHALE THE CONTENTS OF 1 VIAL VIA NEBULIZER EVERY 6 HOURS AS   NEEDED.   Lantus Solostar U-100 Insulin 100 unit/mL (3 mL) pen; Generic drug:   insulin glargine; Inject 10 Units under the skin once daily at " bedtime.   * levothyroxine 50 mcg tablet; Commonly known as: Synthroid, Levoxyl;   Take 1 tablet (50 mcg) by mouth once daily in the morning. Take before   meals.   * levothyroxine 200 mcg tablet; Commonly known as: Synthroid, Levoxyl;   Take 1 tablet by mouth once daily in the morning before a meal.   metoprolol succinate XL 50 mg 24 hr tablet; Commonly known as:   Toprol-XL; TAKE ONE TABLET BY MOUTH TWO TIMES A DAY   oxygen DME/Hospice therapy; Commonly known as: O2   pantoprazole 40 mg EC tablet; Commonly known as: ProtoNix; TAKE ONE   TABLET BY MOUTH TWO TIMES A DAY   venlafaxine XR 75 mg 24 hr capsule; Commonly known as: Effexor-XR; Take   2 capsules (150 mg) by mouth once daily.  * This list has 2 medication(s) that are the same as other medications   prescribed for you. Read the directions carefully, and ask your doctor or   other care provider to review them with you.     STOP taking these medications     bacitracin-polymyxin B ophthalmic ointment; Commonly known as:   Polysporin   potassium chloride CR 20 mEq ER tablet; Commonly known as: Klor-Con M20       Test Results Pending At Discharge  Pending Labs       No current pending labs.            Hospital Course   Ms. Morris is a very pleasant elderly woman, visually impaired, admitted after having a syncopal episode while sitting in the dialysis chair. No obvious etiology has been determined as of yet. Dr Peterson/smita were consulted, are very familiar with the patient. Will be evaluating further in office, possible monitor for home etc. Patient is also scheduled for a thoracentesis next week, for the right pleural effusion, which she appears to be tolerating at this time. She is feeling quite well now. She had dialysis  yesterday, 2 liters was successfully removed, tolerated well. Labs, sugars are stable. She will soon be moving into the [facility].   Will discharge today, same med regimen, follow up with Dr Peterson soon, get thora next week and resume  dialysis. We did obtain an echo--her pericardial effusion is still present, has not progressed. EF 50%, mod pulm hypertension. Mild valvular disease.     Pertinent Physical Exam At Time of Discharge  Alert, perky, up in chair. Heart regular, 1/6 low pitched systolic murmur. Lungs with basilar crackles, and decreased breath sounds in right base. Abdomen is soft, no tenderness. No edema. Neuro stable. Poor vision, also [place]    Outpatient Follow-Up  Future Appointments   Date Time Provider Department Center   1/3/2025 10:30 AM [facility]   1/15/2025  9:00 AM Kellie Gilbert, APRN-CNP [id number]     Dr Peterson--5-10 days    Alejandra Marshall MD

## 2024-12-29 ENCOUNTER — HOSPITAL ENCOUNTER (EMERGENCY)
Facility: HOSPITAL | Age: 69
Discharge: HOME | End: 2024-12-29
Payer: MEDICARE

## 2024-12-29 ENCOUNTER — APPOINTMENT (OUTPATIENT)
Dept: RADIOLOGY | Facility: HOSPITAL | Age: 69
End: 2024-12-29
Payer: MEDICARE

## 2024-12-29 ENCOUNTER — APPOINTMENT (OUTPATIENT)
Dept: CARDIOLOGY | Facility: HOSPITAL | Age: 69
End: 2024-12-29
Payer: MEDICARE

## 2024-12-29 VITALS
HEART RATE: 74 BPM | BODY MASS INDEX: 26.52 KG/M2 | OXYGEN SATURATION: 95 % | RESPIRATION RATE: 18 BRPM | WEIGHT: 165 LBS | SYSTOLIC BLOOD PRESSURE: 136 MMHG | HEIGHT: 66 IN | DIASTOLIC BLOOD PRESSURE: 84 MMHG | TEMPERATURE: 98.1 F

## 2024-12-29 DIAGNOSIS — R06.02 SHORTNESS OF BREATH: Primary | ICD-10-CM

## 2024-12-29 LAB
ALBUMIN SERPL BCP-MCNC: 3.9 G/DL (ref 3.4–5)
ALP SERPL-CCNC: 95 U/L (ref 33–136)
ALT SERPL W P-5'-P-CCNC: 9 U/L (ref 7–45)
ANION GAP SERPL CALC-SCNC: 15 MMOL/L (ref 10–20)
AST SERPL W P-5'-P-CCNC: 20 U/L (ref 9–39)
BASOPHILS # BLD AUTO: 0.05 X10*3/UL (ref 0–0.1)
BASOPHILS NFR BLD AUTO: 0.7 %
BILIRUB SERPL-MCNC: 0.5 MG/DL (ref 0–1.2)
BUN SERPL-MCNC: 35 MG/DL (ref 6–23)
CALCIUM SERPL-MCNC: 8.7 MG/DL (ref 8.6–10.3)
CHLORIDE SERPL-SCNC: 90 MMOL/L (ref 98–107)
CO2 SERPL-SCNC: 30 MMOL/L (ref 21–32)
CREAT SERPL-MCNC: 3.35 MG/DL (ref 0.5–1.05)
EGFRCR SERPLBLD CKD-EPI 2021: 14 ML/MIN/1.73M*2
EOSINOPHIL # BLD AUTO: 0.06 X10*3/UL (ref 0–0.7)
EOSINOPHIL NFR BLD AUTO: 0.9 %
ERYTHROCYTE [DISTWIDTH] IN BLOOD BY AUTOMATED COUNT: 13.4 % (ref 11.5–14.5)
GLUCOSE SERPL-MCNC: 73 MG/DL (ref 74–99)
HCT VFR BLD AUTO: 41.6 % (ref 36–46)
HGB BLD-MCNC: 13.2 G/DL (ref 12–16)
IMM GRANULOCYTES # BLD AUTO: 0.03 X10*3/UL (ref 0–0.7)
IMM GRANULOCYTES NFR BLD AUTO: 0.4 % (ref 0–0.9)
LYMPHOCYTES # BLD AUTO: 0.54 X10*3/UL (ref 1.2–4.8)
LYMPHOCYTES NFR BLD AUTO: 7.7 %
MAGNESIUM SERPL-MCNC: 1.96 MG/DL (ref 1.6–2.4)
MCH RBC QN AUTO: 31.2 PG (ref 26–34)
MCHC RBC AUTO-ENTMCNC: 31.7 G/DL (ref 32–36)
MCV RBC AUTO: 98 FL (ref 80–100)
MONOCYTES # BLD AUTO: 0.47 X10*3/UL (ref 0.1–1)
MONOCYTES NFR BLD AUTO: 6.7 %
NEUTROPHILS # BLD AUTO: 5.84 X10*3/UL (ref 1.2–7.7)
NEUTROPHILS NFR BLD AUTO: 83.6 %
NRBC BLD-RTO: 0 /100 WBCS (ref 0–0)
PLATELET # BLD AUTO: 215 X10*3/UL (ref 150–450)
POTASSIUM SERPL-SCNC: 4.5 MMOL/L (ref 3.5–5.3)
PROT SERPL-MCNC: 7.2 G/DL (ref 6.4–8.2)
RBC # BLD AUTO: 4.23 X10*6/UL (ref 4–5.2)
SODIUM SERPL-SCNC: 130 MMOL/L (ref 136–145)
WBC # BLD AUTO: 7 X10*3/UL (ref 4.4–11.3)

## 2024-12-29 PROCEDURE — 71045 X-RAY EXAM CHEST 1 VIEW: CPT

## 2024-12-29 PROCEDURE — 80053 COMPREHEN METABOLIC PANEL: CPT | Performed by: HEALTH CARE PROVIDER

## 2024-12-29 PROCEDURE — 85025 COMPLETE CBC W/AUTO DIFF WBC: CPT | Performed by: HEALTH CARE PROVIDER

## 2024-12-29 PROCEDURE — 2500000005 HC RX 250 GENERAL PHARMACY W/O HCPCS: Performed by: EMERGENCY MEDICINE

## 2024-12-29 PROCEDURE — 99285 EMERGENCY DEPT VISIT HI MDM: CPT | Mod: 25

## 2024-12-29 PROCEDURE — 71045 X-RAY EXAM CHEST 1 VIEW: CPT | Performed by: RADIOLOGY

## 2024-12-29 PROCEDURE — 93005 ELECTROCARDIOGRAM TRACING: CPT

## 2024-12-29 PROCEDURE — 83735 ASSAY OF MAGNESIUM: CPT | Performed by: HEALTH CARE PROVIDER

## 2024-12-29 PROCEDURE — 2500000001 HC RX 250 WO HCPCS SELF ADMINISTERED DRUGS (ALT 637 FOR MEDICARE OP)

## 2024-12-29 RX ORDER — ACETAMINOPHEN 325 MG/1
TABLET ORAL
Status: COMPLETED
Start: 2024-12-29 | End: 2024-12-29

## 2024-12-29 RX ORDER — ACETAMINOPHEN 325 MG/1
650 TABLET ORAL ONCE
Status: COMPLETED | OUTPATIENT
Start: 2024-12-29 | End: 2024-12-29

## 2024-12-29 RX ADMIN — ACETAMINOPHEN 650 MG: 325 TABLET ORAL at 21:30

## 2024-12-29 RX ADMIN — ACETAMINOPHEN 650 MG: 325 TABLET, FILM COATED ORAL at 21:30

## 2024-12-29 RX ADMIN — Medication 4 L/MIN: at 17:55

## 2024-12-29 ASSESSMENT — PAIN SCALES - GENERAL
PAINLEVEL_OUTOF10: 0 - NO PAIN
PAINLEVEL_OUTOF10: 6

## 2024-12-29 ASSESSMENT — PAIN - FUNCTIONAL ASSESSMENT
PAIN_FUNCTIONAL_ASSESSMENT: 0-10
PAIN_FUNCTIONAL_ASSESSMENT: 0-10

## 2024-12-29 ASSESSMENT — PAIN DESCRIPTION - LOCATION: LOCATION: HEAD

## 2024-12-29 NOTE — ED TRIAGE NOTES
Pt here via EMS from home with complaints of shortness of breath. She wears 3L NC at baseline. She was discharged home from [facility] yesterday but her oxygen tank is clogged so she has been on RA for 24 hrs and having trouble breathing. Pt is on 4L NC on arrival to ED, O2 sats 98%.

## 2024-12-29 NOTE — ED PROVIDER NOTES
HPI   Chief Complaint   Patient presents with    Shortness of Breath     Pt here via EMS from home with complaints of shortness of breath. She wears 3L NC at baseline. She was discharged home from [facility] yesterday but her oxygen tank is clogged so she has been on RA for 24 hrs and having trouble breathing. Pt is on 4L NC on arrival to ED, O2 sats 98%.       CC: Shortness of breath  HPI:   69-year-old female presents ED via EMS for shortness of breath, patient has a history of COPD, CHF, CAD, PVD, chronic respiratory failure, on 3 L via nasal cannula mostly at night however patient stated when she was discharged yesterday she was on 5 L via nasal cannula, patient states she could not get her oxygen to work at home and she had been out of her oxygen since discharge yesterday, patient has end-stage renal disease on dialysis Tuesdays Thursdays and Saturdays.  She was admitted to [facility] on 12/26/2024 for syncope, collapse, she has a right pleural effusion, she did receive dialysis yesterday.    Additional Limitations to History:   External Records Reviewed: I reviewed recent and relevant outside records including   History Obtained From:     Past Medical History: Per HPI  Medications: Reviewed in EMR and with patient  Allergies:  Reviewed in EMR  Past Surgical History:   Social History:     ------------------------------------------------------------------------------------------------------  Physical Exam:  --Vital signs reviewed in nursing triage note, EMR flow sheets, and at patient's bedside  GEN:  A&Ox3, no acute distress, appears comfortable.  Conversational and appropriate.  No confusion or gross mental status changes.  EYES: EOMI, non-injected sclera.  ENT: Moist mucous membranes, no apparent injuries or lesions.   CARDIO: Normal rate and regular rhythm. No murmurs, rubs, or gallops.  2+ equal pulses of the distal extremities.   PULM: Clear to auscultation bilaterally. No rales, rhonchi,  or wheezes. Good symmetric chest expansion.  GI: Soft, non-tender, non-distended. No rebound tenderness or guarding.  SKIN: Warm and dry, no rashes or lesions.  MSK: ROM intact the extremities without contractures.   EXT: No peripheral edema, contusions, or wounds.   NEURO: Cranial nerves II-XII grossly intact. Sensation to light touch intact and equal bilaterally in upper and lower extremities.  Symmetric 5/5 strength in upper and lower extremities.  PSYCH: Appropriate mood and behavior, converses and responds appropriately during exam.  -------------------------------------------------------------------------------------------------------        Differential Diagnoses Considered:   Chronic Medical Conditions Significantly Affecting Care:   Diagnostic testing considered: [PERC, D-Dimer, PECARN, etc.]    - EKG interpreted by myself   - I independently interpreted: [CXR, CT, POCUS, etc. including your interpretation]  - Labs notable for     Escalation of Care: Appropriate for   Social Determinants of Health Significantly Affecting Care: [Homelessness, lacking transportation, uninsured, unable to afford medications]  Prescription Drug Consideration: [Antibiotics, antivirals, pain medications, etc.]  Discussion of Management with Other Providers:  I discussed the patient/results with: [admitting team, consultant, radiologist, social work, EPAT, case management, PT/OT, RT, PCP, etc.]      Joseph Mabry PA-C              Patient History   Past Medical History:   Diagnosis Date    Anal fissure 10/12/2023    Anemia     ASHD (arteriosclerotic heart disease)     At risk for falls     Atrial fibrillation (Multi)     CHF (congestive heart failure)     CKD (chronic kidney disease)     COPD (chronic obstructive pulmonary disease) (Multi)     CVA (cerebral vascular accident) (Multi)     2021- right-sided weakness    Depression     Diabetes mellitus (Multi)     GERD (gastroesophageal reflux disease)     H/O pleural effusion      Hyperlipidemia     Hypertension     Hypothyroidism     Hypoxia     Impacted cerumen, left ear     Impacted cerumen of left ear    Noncompliance     Osteoarthritis     Perianal dermatitis 10/12/2023    Personal history of other diseases of the respiratory system     History of acute bronchitis    PVD (peripheral vascular disease) (CMS-HCC)     Renal carcinoma, right (Multi)     s/p partial nephrectomy 2021    Respiratory failure (Multi)     TIA (transient ischemic attack)     Vasculitis (CMS-HCC) 10/12/2023    Weakness      Past Surgical History:   Procedure Laterality Date    ANKLE SURGERY          CARDIAC CATHETERIZATION N/A 2024    Procedure: Right Heart Cath;  Surgeon: Nicholas Antonio MD;  Location: [facility] Cardiac Cath Lab;  Service: Cardiovascular;  Laterality: N/A;    CATARACT EXTRACTION Right     2019     SECTION, CLASSIC      MR CHEST ANGIO W AND WO IV CONTRAST  2023    MR CHEST ANGIO W AND WO IV CONTRAST 2023 [facility] MRI    MR HEAD ANGIO WO IV CONTRAST  2021    MR HEAD ANGIO WO IV CONTRAST [facility]    NEPHRECTOMY  2021    SHOULDER SURGERY           Family History   Problem Relation Name Age of Onset    Hypertension Mother      Diabetes Father      Heart disease Father      Cancer Sister      Cancer Brother      Diabetes Daughter      Asthma Daughter      Heart attack Daughter      Diabetes Maternal Grandfather      Heart disease Paternal Grandmother      Diabetes Other      Hypertension Other      COPD Other      Other (chronic lung disease) Other       Social History     Tobacco Use    Smoking status: Every Day     Current packs/day: 0.50     Average packs/day: 0.5 packs/day for 56.0 years (28.0 ttl pk-yrs)     Types: Cigarettes     Start date: 1969     Passive exposure: Never    Smokeless tobacco: Never   Vaping Use    Vaping status: Never Used   Substance Use Topics    Alcohol use: Not Currently    Drug use: Not Currently     Types:  Marijuana       Physical Exam   ED Triage Vitals [12/29/24 1745]   Temperature Heart Rate Respirations BP   36.7 °C (98 °F) 74 18 158/71      Pulse Ox Temp Source Heart Rate Source Patient Position   96 % Temporal Monitor --      BP Location FiO2 (%)     -- --       Physical Exam      ED Course & MDM   Diagnoses as of 12/30/24 1345   Shortness of breath                 No data recorded     Lee Coma Scale Score: 15 (12/29/24 1751 : Nina Graff, BEATRICE)                           Medical Decision Making  69-year-old female with history of COPD, CHF, end-stage renal disease on dialysis, atrial fibrillation who was brought to the emergency room because she could not get her oxygen tank at home to operate properly, we were able to contact the company who supplies oxygen to the patient and they are and route to her residence at this time to replace the oxygen tank, this was confirmed after speaking with the patient's nephew, and he is en route to the hospital to  patient.  Patient understands that she needs to return to the emergency room if she has any additional problems with her oxygen or if she becomes increasingly short of breath or if she develops any fevers, chills.        Procedure  Procedures     Joseph Mabry PA-C  12/29/24 1959       Joseph Mabry PA-C  12/30/24 1347

## 2024-12-30 NOTE — TELEPHONE ENCOUNTER
Patient discharged home 12/28/24. She then presented back to to the [facility] ED 12/29/24 with c/o SOB and was T&R. Patient states she could not get her oxygen to work at home and she had been out of her oxygen since discharge yesterday.

## 2024-12-31 ENCOUNTER — HOSPITAL ENCOUNTER (INPATIENT)
Facility: HOSPITAL | Age: 69
End: 2024-12-31
Attending: INTERNAL MEDICINE | Admitting: HOSPITALIST
Payer: MEDICARE

## 2024-12-31 ENCOUNTER — HOSPITAL ENCOUNTER (EMERGENCY)
Facility: HOSPITAL | Age: 69
Discharge: ANOTHER HOSPITAL | End: 2024-12-31
Attending: STUDENT IN AN ORGANIZED HEALTH CARE EDUCATION/TRAINING PROGRAM
Payer: MEDICARE

## 2024-12-31 ENCOUNTER — APPOINTMENT (OUTPATIENT)
Dept: RADIOLOGY | Facility: HOSPITAL | Age: 69
End: 2024-12-31
Payer: MEDICARE

## 2024-12-31 ENCOUNTER — APPOINTMENT (OUTPATIENT)
Dept: CARDIOLOGY | Facility: HOSPITAL | Age: 69
End: 2024-12-31
Payer: MEDICARE

## 2024-12-31 ENCOUNTER — HOSPITAL ENCOUNTER (EMERGENCY)
Facility: HOSPITAL | Age: 69
End: 2024-12-31
Payer: MEDICARE

## 2024-12-31 DIAGNOSIS — Z99.2 DIALYSIS PATIENT (CMS-HCC): ICD-10-CM

## 2024-12-31 DIAGNOSIS — J11.1 INFLUENZA: Primary | ICD-10-CM

## 2024-12-31 DIAGNOSIS — J90 RECURRENT PLEURAL EFFUSION: ICD-10-CM

## 2024-12-31 DIAGNOSIS — E87.29 RESPIRATORY ACIDOSIS: ICD-10-CM

## 2024-12-31 DIAGNOSIS — J10.1 INFLUENZA A: ICD-10-CM
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
## 2024-12-31 LAB
ALBUMIN SERPL BCP-MCNC: 3.6 G/DL (ref 3.4–5)
ALBUMIN SERPL BCP-MCNC: 3.7 G/DL (ref 3.4–5)
ALP SERPL-CCNC: 91 U/L (ref 33–136)
ALP SERPL-CCNC: 97 U/L (ref 33–136)
ALT SERPL W P-5'-P-CCNC: 12 U/L (ref 7–45)
ALT SERPL W P-5'-P-CCNC: 13 U/L (ref 7–45)
ANION GAP BLDV CALCULATED.4IONS-SCNC: 11 MMOL/L (ref 10–25)
ANION GAP BLDV CALCULATED.4IONS-SCNC: 12 MMOL/L (ref 10–25)
ANION GAP BLDV CALCULATED.4IONS-SCNC: 12 MMOL/L (ref 10–25)
ANION GAP BLDV CALCULATED.4IONS-SCNC: 13 MMOL/L (ref 10–25)
ANION GAP SERPL CALC-SCNC: 16 MMOL/L (ref 10–20)
ANION GAP SERPL CALC-SCNC: 18 MMOL/L (ref 10–20)
APPEARANCE UR: ABNORMAL
AST SERPL W P-5'-P-CCNC: 41 U/L (ref 9–39)
AST SERPL W P-5'-P-CCNC: 43 U/L (ref 9–39)
ATRIAL RATE: 71 BPM
BACTERIA #/AREA URNS AUTO: ABNORMAL /HPF
BASE EXCESS BLDV CALC-SCNC: -1.7 MMOL/L (ref -2–3)
BASE EXCESS BLDV CALC-SCNC: -2.9 MMOL/L (ref -2–3)
BASE EXCESS BLDV CALC-SCNC: -3.4 MMOL/L (ref -2–3)
BASE EXCESS BLDV CALC-SCNC: -4 MMOL/L (ref -2–3)
BASOPHILS # BLD AUTO: 0.01 X10*3/UL (ref 0–0.1)
BASOPHILS # BLD AUTO: 0.01 X10*3/UL (ref 0–0.1)
BASOPHILS NFR BLD AUTO: 0.1 %
BASOPHILS NFR BLD AUTO: 0.2 %
BILIRUB SERPL-MCNC: 0.4 MG/DL (ref 0–1.2)
BILIRUB SERPL-MCNC: 0.5 MG/DL (ref 0–1.2)
BILIRUB UR STRIP.AUTO-MCNC: NEGATIVE MG/DL
BNP SERPL-MCNC: 1735 PG/ML (ref 0–99)
BODY TEMPERATURE: ABNORMAL
BUN SERPL-MCNC: 55 MG/DL (ref 6–23)
BUN SERPL-MCNC: 62 MG/DL (ref 6–23)
CA-I BLDV-SCNC: 0.92 MMOL/L (ref 1.1–1.33)
CA-I BLDV-SCNC: 0.94 MMOL/L (ref 1.1–1.33)
CA-I BLDV-SCNC: 0.97 MMOL/L (ref 1.1–1.33)
CA-I BLDV-SCNC: 0.99 MMOL/L (ref 1.1–1.33)
CALCIUM SERPL-MCNC: 7.1 MG/DL (ref 8.6–10.3)
CALCIUM SERPL-MCNC: 7.5 MG/DL (ref 8.6–10.3)
CARDIAC TROPONIN I PNL SERPL HS: 27 NG/L (ref 0–13)
CARDIAC TROPONIN I PNL SERPL HS: 28 NG/L (ref 0–13)
CHLORIDE BLDV-SCNC: 92 MMOL/L (ref 98–107)
CHLORIDE BLDV-SCNC: 93 MMOL/L (ref 98–107)
CHLORIDE BLDV-SCNC: 93 MMOL/L (ref 98–107)
CHLORIDE BLDV-SCNC: 94 MMOL/L (ref 98–107)
CHLORIDE SERPL-SCNC: 91 MMOL/L (ref 98–107)
CHLORIDE SERPL-SCNC: 93 MMOL/L (ref 98–107)
CK SERPL-CCNC: 1137 U/L (ref 0–215)
CK SERPL-CCNC: 878 U/L (ref 0–215)
CO2 SERPL-SCNC: 23 MMOL/L (ref 21–32)
CO2 SERPL-SCNC: 27 MMOL/L (ref 21–32)
COLOR UR: YELLOW
CREAT SERPL-MCNC: 4.12 MG/DL (ref 0.5–1.05)
CREAT SERPL-MCNC: 4.52 MG/DL (ref 0.5–1.05)
EGFRCR SERPLBLD CKD-EPI 2021: 10 ML/MIN/1.73M*2
EGFRCR SERPLBLD CKD-EPI 2021: 11 ML/MIN/1.73M*2
EOSINOPHIL # BLD AUTO: 0.06 X10*3/UL (ref 0–0.7)
EOSINOPHIL # BLD AUTO: 0.3 X10*3/UL (ref 0–0.7)
EOSINOPHIL NFR BLD AUTO: 0.9 %
EOSINOPHIL NFR BLD AUTO: 4.3 %
ERYTHROCYTE [DISTWIDTH] IN BLOOD BY AUTOMATED COUNT: 13.3 % (ref 11.5–14.5)
ERYTHROCYTE [DISTWIDTH] IN BLOOD BY AUTOMATED COUNT: 13.5 % (ref 11.5–14.5)
FLUAV RNA RESP QL NAA+PROBE: DETECTED
FLUBV RNA RESP QL NAA+PROBE: NOT DETECTED
GLUCOSE BLD MANUAL STRIP-MCNC: 153 MG/DL (ref 74–99)
GLUCOSE BLD MANUAL STRIP-MCNC: 37 MG/DL (ref 74–99)
GLUCOSE BLDV-MCNC: 161 MG/DL (ref 74–99)
GLUCOSE BLDV-MCNC: 73 MG/DL (ref 74–99)
GLUCOSE BLDV-MCNC: 83 MG/DL (ref 74–99)
GLUCOSE BLDV-MCNC: ABNORMAL MG/DL
GLUCOSE SERPL-MCNC: 47 MG/DL (ref 74–99)
GLUCOSE SERPL-MCNC: 81 MG/DL (ref 74–99)
GLUCOSE UR STRIP.AUTO-MCNC: ABNORMAL MG/DL
HCO3 BLDV-SCNC: 26.3 MMOL/L (ref 22–26)
HCO3 BLDV-SCNC: 27.4 MMOL/L (ref 22–26)
HCO3 BLDV-SCNC: 27.9 MMOL/L (ref 22–26)
HCO3 BLDV-SCNC: 28.3 MMOL/L (ref 22–26)
HCT VFR BLD AUTO: 34.9 % (ref 36–46)
HCT VFR BLD AUTO: 38.2 % (ref 36–46)
HCT VFR BLD EST: 33 % (ref 36–46)
HCT VFR BLD EST: 35 % (ref 36–46)
HCT VFR BLD EST: 36 % (ref 36–46)
HCT VFR BLD EST: 38 % (ref 36–46)
HGB BLD-MCNC: 11.2 G/DL (ref 12–16)
HGB BLD-MCNC: 12 G/DL (ref 12–16)
HGB BLDV-MCNC: 10.9 G/DL (ref 12–16)
HGB BLDV-MCNC: 11.5 G/DL (ref 12–16)
HGB BLDV-MCNC: 12.1 G/DL (ref 12–16)
HGB BLDV-MCNC: 12.7 G/DL (ref 12–16)
HOLD SPECIMEN: NORMAL
HYALINE CASTS #/AREA URNS AUTO: ABNORMAL /LPF
IMM GRANULOCYTES # BLD AUTO: 0.07 X10*3/UL (ref 0–0.7)
IMM GRANULOCYTES # BLD AUTO: 0.07 X10*3/UL (ref 0–0.7)
IMM GRANULOCYTES NFR BLD AUTO: 1 % (ref 0–0.9)
IMM GRANULOCYTES NFR BLD AUTO: 1.1 % (ref 0–0.9)
INHALED O2 CONCENTRATION: 35 %
INHALED O2 CONCENTRATION: 35 %
INHALED O2 CONCENTRATION: 36 %
INHALED O2 CONCENTRATION: 45 %
KETONES UR STRIP.AUTO-MCNC: NEGATIVE MG/DL
LACTATE BLDV-SCNC: 0.5 MMOL/L (ref 0.4–2)
LACTATE BLDV-SCNC: 0.9 MMOL/L (ref 0.4–2)
LACTATE BLDV-SCNC: 1.2 MMOL/L (ref 0.4–2)
LACTATE BLDV-SCNC: 1.3 MMOL/L (ref 0.4–2)
LACTATE SERPL-SCNC: 1 MMOL/L (ref 0.4–2)
LEUKOCYTE ESTERASE UR QL STRIP.AUTO: ABNORMAL
LIPASE SERPL-CCNC: 21 U/L (ref 9–82)
LYMPHOCYTES # BLD AUTO: 0.44 X10*3/UL (ref 1.2–4.8)
LYMPHOCYTES # BLD AUTO: 0.48 X10*3/UL (ref 1.2–4.8)
LYMPHOCYTES NFR BLD AUTO: 6.8 %
LYMPHOCYTES NFR BLD AUTO: 7 %
MAGNESIUM SERPL-MCNC: 2.19 MG/DL (ref 1.6–2.4)
MCH RBC QN AUTO: 31.7 PG (ref 26–34)
MCH RBC QN AUTO: 31.9 PG (ref 26–34)
MCHC RBC AUTO-ENTMCNC: 31.4 G/DL (ref 32–36)
MCHC RBC AUTO-ENTMCNC: 32.1 G/DL (ref 32–36)
MCV RBC AUTO: 101 FL (ref 80–100)
MCV RBC AUTO: 99 FL (ref 80–100)
MONOCYTES # BLD AUTO: 0.63 X10*3/UL (ref 0.1–1)
MONOCYTES # BLD AUTO: 0.72 X10*3/UL (ref 0.1–1)
MONOCYTES NFR BLD AUTO: 11.4 %
MONOCYTES NFR BLD AUTO: 8.9 %
MUCOUS THREADS #/AREA URNS AUTO: ABNORMAL /LPF
NEUTROPHILS # BLD AUTO: 5.03 X10*3/UL (ref 1.2–7.7)
NEUTROPHILS # BLD AUTO: 5.56 X10*3/UL (ref 1.2–7.7)
NEUTROPHILS NFR BLD AUTO: 78.9 %
NEUTROPHILS NFR BLD AUTO: 79.4 %
NITRITE UR QL STRIP.AUTO: NEGATIVE
NRBC BLD-RTO: 0 /100 WBCS (ref 0–0)
NRBC BLD-RTO: 0 /100 WBCS (ref 0–0)
OXYHGB MFR BLDV: 54.7 % (ref 45–75)
OXYHGB MFR BLDV: 54.9 % (ref 45–75)
OXYHGB MFR BLDV: 69 % (ref 45–75)
OXYHGB MFR BLDV: 70.1 % (ref 45–75)
P OFFSET: 141 MS
P ONSET: 113 MS
PCO2 BLDV: 70 MM HG (ref 41–51)
PCO2 BLDV: 72 MM HG (ref 41–51)
PCO2 BLDV: 87 MM HG (ref 41–51)
PCO2 BLDV: 92 MM HG (ref 41–51)
PH BLDV: 7.09 PH (ref 7.33–7.43)
PH BLDV: 7.12 PH (ref 7.33–7.43)
PH BLDV: 7.17 PH (ref 7.33–7.43)
PH BLDV: 7.2 PH (ref 7.33–7.43)
PH UR STRIP.AUTO: 6 [PH]
PHOSPHATE SERPL-MCNC: 8.8 MG/DL (ref 2.5–4.9)
PHOSPHATE SERPL-MCNC: 8.8 MG/DL (ref 2.5–4.9)
PLATELET # BLD AUTO: 201 X10*3/UL (ref 150–450)
PLATELET # BLD AUTO: 202 X10*3/UL (ref 150–450)
PO2 BLDV: 38 MM HG (ref 35–45)
PO2 BLDV: 40 MM HG (ref 35–45)
PO2 BLDV: 43 MM HG (ref 35–45)
PO2 BLDV: 45 MM HG (ref 35–45)
POTASSIUM BLDV-SCNC: 5.2 MMOL/L (ref 3.5–5.3)
POTASSIUM BLDV-SCNC: 5.2 MMOL/L (ref 3.5–5.3)
POTASSIUM BLDV-SCNC: 5.3 MMOL/L (ref 3.5–5.3)
POTASSIUM BLDV-SCNC: 5.4 MMOL/L (ref 3.5–5.3)
POTASSIUM SERPL-SCNC: 4.8 MMOL/L (ref 3.5–5.3)
POTASSIUM SERPL-SCNC: 5.2 MMOL/L (ref 3.5–5.3)
PR INTERVAL: 202 MS
PROT SERPL-MCNC: 6.3 G/DL (ref 6.4–8.2)
PROT SERPL-MCNC: 6.5 G/DL (ref 6.4–8.2)
PROT UR STRIP.AUTO-MCNC: ABNORMAL MG/DL
Q ONSET: 214 MS
QRS COUNT: 11 BEATS
QRS DURATION: 132 MS
QT INTERVAL: 470 MS
QTC CALCULATION(BAZETT): 510 MS
QTC FREDERICIA: 497 MS
R AXIS: -65 DEGREES
RBC # BLD AUTO: 3.51 X10*6/UL (ref 4–5.2)
RBC # BLD AUTO: 3.78 X10*6/UL (ref 4–5.2)
RBC # UR STRIP.AUTO: ABNORMAL /UL
RBC #/AREA URNS AUTO: ABNORMAL /HPF
SAO2 % BLDV: 56 % (ref 45–75)
SAO2 % BLDV: 56 % (ref 45–75)
SAO2 % BLDV: 70 % (ref 45–75)
SAO2 % BLDV: 72 % (ref 45–75)
SARS-COV-2 RNA RESP QL NAA+PROBE: NOT DETECTED
SODIUM BLDV-SCNC: 127 MMOL/L (ref 136–145)
SODIUM SERPL-SCNC: 129 MMOL/L (ref 136–145)
SODIUM SERPL-SCNC: 129 MMOL/L (ref 136–145)
SP GR UR STRIP.AUTO: 1.02
T AXIS: 85 DEGREES
T OFFSET: 449 MS
UROBILINOGEN UR STRIP.AUTO-MCNC: NORMAL MG/DL
VENTRICULAR RATE: 71 BPM
WBC # BLD AUTO: 6.3 X10*3/UL (ref 4.4–11.3)
WBC # BLD AUTO: 7.1 X10*3/UL (ref 4.4–11.3)
WBC #/AREA URNS AUTO: >50 /HPF
WBC CLUMPS #/AREA URNS AUTO: ABNORMAL /HPF

## 2024-12-31 PROCEDURE — 1200000002 HC GENERAL ROOM WITH TELEMETRY DAILY

## 2024-12-31 PROCEDURE — 83605 ASSAY OF LACTIC ACID: CPT | Performed by: STUDENT IN AN ORGANIZED HEALTH CARE EDUCATION/TRAINING PROGRAM

## 2024-12-31 PROCEDURE — 94660 CPAP INITIATION&MGMT: CPT

## 2024-12-31 PROCEDURE — 70450 CT HEAD/BRAIN W/O DYE: CPT

## 2024-12-31 PROCEDURE — 82947 ASSAY GLUCOSE BLOOD QUANT: CPT

## 2024-12-31 PROCEDURE — 72125 CT NECK SPINE W/O DYE: CPT | Performed by: RADIOLOGY

## 2024-12-31 PROCEDURE — 70450 CT HEAD/BRAIN W/O DYE: CPT | Performed by: RADIOLOGY

## 2024-12-31 PROCEDURE — 83880 ASSAY OF NATRIURETIC PEPTIDE: CPT | Performed by: STUDENT IN AN ORGANIZED HEALTH CARE EDUCATION/TRAINING PROGRAM

## 2024-12-31 PROCEDURE — 84132 ASSAY OF SERUM POTASSIUM: CPT | Performed by: STUDENT IN AN ORGANIZED HEALTH CARE EDUCATION/TRAINING PROGRAM

## 2024-12-31 PROCEDURE — 99291 CRITICAL CARE FIRST HOUR: CPT | Mod: 25 | Performed by: STUDENT IN AN ORGANIZED HEALTH CARE EDUCATION/TRAINING PROGRAM

## 2024-12-31 PROCEDURE — 72125 CT NECK SPINE W/O DYE: CPT

## 2024-12-31 PROCEDURE — 9420000001 HC RT PATIENT EDUCATION 5 MIN

## 2024-12-31 PROCEDURE — 83735 ASSAY OF MAGNESIUM: CPT | Performed by: STUDENT IN AN ORGANIZED HEALTH CARE EDUCATION/TRAINING PROGRAM

## 2024-12-31 PROCEDURE — 73090 X-RAY EXAM OF FOREARM: CPT | Mod: RIGHT SIDE | Performed by: RADIOLOGY

## 2024-12-31 PROCEDURE — 5A09357 ASSISTANCE WITH RESPIRATORY VENTILATION, LESS THAN 24 CONSECUTIVE HOURS, CONTINUOUS POSITIVE AIRWAY PRESSURE: ICD-10-PCS | Performed by: HOSPITALIST

## 2024-12-31 PROCEDURE — 94799 UNLISTED PULMONARY SVC/PX: CPT

## 2024-12-31 PROCEDURE — 99285 EMERGENCY DEPT VISIT HI MDM: CPT | Mod: 25 | Performed by: STUDENT IN AN ORGANIZED HEALTH CARE EDUCATION/TRAINING PROGRAM

## 2024-12-31 PROCEDURE — 2500000005 HC RX 250 GENERAL PHARMACY W/O HCPCS: Performed by: STUDENT IN AN ORGANIZED HEALTH CARE EDUCATION/TRAINING PROGRAM

## 2024-12-31 PROCEDURE — 84100 ASSAY OF PHOSPHORUS: CPT | Performed by: STUDENT IN AN ORGANIZED HEALTH CARE EDUCATION/TRAINING PROGRAM

## 2024-12-31 PROCEDURE — 84484 ASSAY OF TROPONIN QUANT: CPT | Performed by: STUDENT IN AN ORGANIZED HEALTH CARE EDUCATION/TRAINING PROGRAM

## 2024-12-31 PROCEDURE — 87636 SARSCOV2 & INF A&B AMP PRB: CPT | Performed by: STUDENT IN AN ORGANIZED HEALTH CARE EDUCATION/TRAINING PROGRAM

## 2024-12-31 PROCEDURE — 82550 ASSAY OF CK (CPK): CPT | Performed by: STUDENT IN AN ORGANIZED HEALTH CARE EDUCATION/TRAINING PROGRAM

## 2024-12-31 PROCEDURE — 83690 ASSAY OF LIPASE: CPT | Performed by: STUDENT IN AN ORGANIZED HEALTH CARE EDUCATION/TRAINING PROGRAM

## 2024-12-31 PROCEDURE — 85025 COMPLETE CBC W/AUTO DIFF WBC: CPT | Performed by: STUDENT IN AN ORGANIZED HEALTH CARE EDUCATION/TRAINING PROGRAM

## 2024-12-31 PROCEDURE — 93005 ELECTROCARDIOGRAM TRACING: CPT

## 2024-12-31 PROCEDURE — 73090 X-RAY EXAM OF FOREARM: CPT | Mod: RT

## 2024-12-31 PROCEDURE — 96374 THER/PROPH/DIAG INJ IV PUSH: CPT

## 2024-12-31 PROCEDURE — 2500000005 HC RX 250 GENERAL PHARMACY W/O HCPCS

## 2024-12-31 PROCEDURE — 87086 URINE CULTURE/COLONY COUNT: CPT | Mod: GENLAB | Performed by: STUDENT IN AN ORGANIZED HEALTH CARE EDUCATION/TRAINING PROGRAM

## 2024-12-31 PROCEDURE — 81001 URINALYSIS AUTO W/SCOPE: CPT | Performed by: STUDENT IN AN ORGANIZED HEALTH CARE EDUCATION/TRAINING PROGRAM

## 2024-12-31 RX ORDER — AMLODIPINE BESYLATE 5 MG/1
5 TABLET ORAL DAILY
Status: DISCONTINUED | OUTPATIENT
Start: 2025-01-01 | End: 2025-01-08 | Stop reason: HOSPADM

## 2024-12-31 RX ORDER — ACETAMINOPHEN 325 MG/1
650 TABLET ORAL EVERY 4 HOURS PRN
Status: DISCONTINUED | OUTPATIENT
Start: 2024-12-31 | End: 2025-01-08 | Stop reason: HOSPADM

## 2024-12-31 RX ORDER — LEVOTHYROXINE SODIUM 125 UG/1
250 TABLET ORAL
Status: DISCONTINUED | OUTPATIENT
Start: 2025-01-01 | End: 2025-01-08 | Stop reason: HOSPADM

## 2024-12-31 RX ORDER — ACETAMINOPHEN 160 MG/5ML
650 SOLUTION ORAL EVERY 4 HOURS PRN
Status: DISCONTINUED | OUTPATIENT
Start: 2024-12-31 | End: 2025-01-08 | Stop reason: HOSPADM

## 2024-12-31 RX ORDER — DEXTROSE 50 % IN WATER (D50W) INTRAVENOUS SYRINGE
25
Status: DISCONTINUED | OUTPATIENT
Start: 2024-12-31 | End: 2025-01-08 | Stop reason: HOSPADM

## 2024-12-31 RX ORDER — ENOXAPARIN SODIUM 100 MG/ML
30 INJECTION SUBCUTANEOUS EVERY 24 HOURS
Status: DISCONTINUED | OUTPATIENT
Start: 2025-01-01 | End: 2025-01-01 | Stop reason: ALTCHOICE

## 2024-12-31 RX ORDER — VENLAFAXINE HYDROCHLORIDE 75 MG/1
150 CAPSULE, EXTENDED RELEASE ORAL DAILY
Status: DISCONTINUED | OUTPATIENT
Start: 2025-01-01 | End: 2025-01-08 | Stop reason: HOSPADM

## 2024-12-31 RX ORDER — METOPROLOL SUCCINATE 50 MG/1
50 TABLET, EXTENDED RELEASE ORAL 2 TIMES DAILY
Status: DISCONTINUED | OUTPATIENT
Start: 2025-01-01 | End: 2025-01-08 | Stop reason: HOSPADM

## 2024-12-31 RX ORDER — ONDANSETRON 4 MG/1
4 TABLET, ORALLY DISINTEGRATING ORAL EVERY 8 HOURS PRN
Status: DISCONTINUED | OUTPATIENT
Start: 2024-12-31 | End: 2025-01-08 | Stop reason: HOSPADM

## 2024-12-31 RX ORDER — INSULIN LISPRO 100 [IU]/ML
0-10 INJECTION, SOLUTION INTRAVENOUS; SUBCUTANEOUS EVERY 4 HOURS
Status: DISCONTINUED | OUTPATIENT
Start: 2025-01-01 | End: 2025-01-03

## 2024-12-31 RX ORDER — INSULIN GLARGINE 100 [IU]/ML
10 INJECTION, SOLUTION SUBCUTANEOUS NIGHTLY
Status: DISCONTINUED | OUTPATIENT
Start: 2025-01-01 | End: 2025-01-08 | Stop reason: HOSPADM

## 2024-12-31 RX ORDER — FERROUS GLUCONATE 325 MG
324 TABLET ORAL
Status: DISCONTINUED | OUTPATIENT
Start: 2025-01-01 | End: 2025-01-08 | Stop reason: HOSPADM

## 2024-12-31 RX ORDER — ONDANSETRON HYDROCHLORIDE 2 MG/ML
4 INJECTION, SOLUTION INTRAVENOUS EVERY 8 HOURS PRN
Status: DISCONTINUED | OUTPATIENT
Start: 2024-12-31 | End: 2025-01-08 | Stop reason: HOSPADM

## 2024-12-31 RX ORDER — ACETAMINOPHEN 650 MG/1
650 SUPPOSITORY RECTAL EVERY 4 HOURS PRN
Status: DISCONTINUED | OUTPATIENT
Start: 2024-12-31 | End: 2025-01-08 | Stop reason: HOSPADM

## 2024-12-31 RX ORDER — DEXTROSE 50 % IN WATER (D50W) INTRAVENOUS SYRINGE
Status: COMPLETED
Start: 2024-12-31 | End: 2024-12-31

## 2024-12-31 RX ORDER — CLOPIDOGREL BISULFATE 75 MG/1
75 TABLET ORAL DAILY
Status: DISCONTINUED | OUTPATIENT
Start: 2025-01-01 | End: 2025-01-08 | Stop reason: HOSPADM

## 2024-12-31 RX ORDER — AMIODARONE HYDROCHLORIDE 200 MG/1
200 TABLET ORAL
Status: DISCONTINUED | OUTPATIENT
Start: 2025-01-01 | End: 2025-01-08 | Stop reason: HOSPADM

## 2024-12-31 RX ORDER — PANTOPRAZOLE SODIUM 40 MG/1
40 TABLET, DELAYED RELEASE ORAL 2 TIMES DAILY
Status: DISCONTINUED | OUTPATIENT
Start: 2025-01-01 | End: 2025-01-08 | Stop reason: HOSPADM

## 2024-12-31 RX ORDER — DEXTROSE 50 % IN WATER (D50W) INTRAVENOUS SYRINGE
25 ONCE
Status: COMPLETED | OUTPATIENT
Start: 2024-12-31 | End: 2024-12-31

## 2024-12-31 RX ORDER — ASPIRIN 81 MG/1
81 TABLET ORAL DAILY
Status: DISCONTINUED | OUTPATIENT
Start: 2025-01-01 | End: 2025-01-08 | Stop reason: HOSPADM

## 2024-12-31 RX ORDER — ATORVASTATIN CALCIUM 40 MG/1
40 TABLET, FILM COATED ORAL DAILY
Status: DISCONTINUED | OUTPATIENT
Start: 2025-01-01 | End: 2025-01-08 | Stop reason: HOSPADM

## 2024-12-31 RX ORDER — DEXTROSE 50 % IN WATER (D50W) INTRAVENOUS SYRINGE
12.5
Status: DISCONTINUED | OUTPATIENT
Start: 2024-12-31 | End: 2025-01-08 | Stop reason: HOSPADM

## 2024-12-31 RX ADMIN — DEXTROSE 50 % IN WATER (D50W) INTRAVENOUS SYRINGE 25 G: at 18:29

## 2024-12-31 RX ADMIN — DEXTROSE MONOHYDRATE 25 G: 25 INJECTION, SOLUTION INTRAVENOUS at 18:29

## 2024-12-31 RX ADMIN — Medication 35 PERCENT: at 09:07

## 2024-12-31 ASSESSMENT — PAIN SCALES - GENERAL: PAINLEVEL_OUTOF10: 0 - NO PAIN

## 2024-12-31 ASSESSMENT — PAIN - FUNCTIONAL ASSESSMENT: PAIN_FUNCTIONAL_ASSESSMENT: 0-10

## 2024-12-31 NOTE — Clinical Note
1L clear yellow fluid removed during R thora. Labs sent. VSS. Uneventful. Bedside RN updated. Only local lidocaine used.

## 2024-12-31 NOTE — ED TRIAGE NOTES
Pt here via EMS from home after being found on the floor by her nephew. EMS reports pt was down for 16 hrs. Pt says she fell out of bed but doesn't remember how. Pt is on 3L NC at baseline. Pt denies pain.

## 2025-01-01 ENCOUNTER — APPOINTMENT (OUTPATIENT)
Dept: RADIOLOGY | Facility: HOSPITAL | Age: 70
End: 2025-01-01
Payer: MEDICARE

## 2025-01-01 VITALS
BODY MASS INDEX: 27.07 KG/M2 | HEIGHT: 66 IN | SYSTOLIC BLOOD PRESSURE: 138 MMHG | WEIGHT: 168.43 LBS | HEART RATE: 76 BPM | DIASTOLIC BLOOD PRESSURE: 59 MMHG | OXYGEN SATURATION: 94 % | RESPIRATION RATE: 24 BRPM | TEMPERATURE: 98.1 F

## 2025-01-01 LAB
ALBUMIN SERPL BCP-MCNC: 3 G/DL (ref 3.4–5)
ALP SERPL-CCNC: 83 U/L (ref 33–136)
ALT SERPL W P-5'-P-CCNC: 13 U/L (ref 7–45)
ANION GAP BLDA CALCULATED.4IONS-SCNC: 11 MMO/L (ref 10–25)
ANION GAP BLDA CALCULATED.4IONS-SCNC: 11 MMO/L (ref 10–25)
ANION GAP SERPL CALC-SCNC: 15 MMOL/L (ref 10–20)
ANION GAP SERPL CALC-SCNC: 17 MMOL/L (ref 10–20)
ARTERIAL PATENCY WRIST A: POSITIVE
AST SERPL W P-5'-P-CCNC: 36 U/L (ref 9–39)
BACTERIA UR CULT: NO GROWTH
BASE EXCESS BLDA CALC-SCNC: -2.2 MMOL/L (ref -2–3)
BASE EXCESS BLDA CALC-SCNC: -2.8 MMOL/L (ref -2–3)
BASOPHILS # BLD AUTO: 0.01 X10*3/UL (ref 0–0.1)
BASOPHILS NFR BLD AUTO: 0.2 %
BILIRUB SERPL-MCNC: 0.4 MG/DL (ref 0–1.2)
BODY TEMPERATURE: 37 DEGREES CELSIUS
BODY TEMPERATURE: 37 DEGREES CELSIUS
BUN SERPL-MCNC: 62 MG/DL (ref 6–23)
BUN SERPL-MCNC: 64 MG/DL (ref 6–23)
CA-I BLDA-SCNC: 0.91 MMOL/L (ref 1.1–1.33)
CA-I BLDA-SCNC: 0.94 MMOL/L (ref 1.1–1.33)
CALCIUM SERPL-MCNC: 6.3 MG/DL (ref 8.6–10.3)
CALCIUM SERPL-MCNC: 6.4 MG/DL (ref 8.6–10.3)
CHLORIDE BLDA-SCNC: 93 MMOL/L (ref 98–107)
CHLORIDE BLDA-SCNC: 94 MMOL/L (ref 98–107)
CHLORIDE SERPL-SCNC: 93 MMOL/L (ref 98–107)
CHLORIDE SERPL-SCNC: 95 MMOL/L (ref 98–107)
CK SERPL-CCNC: 722 U/L (ref 0–215)
CO2 SERPL-SCNC: 22 MMOL/L (ref 21–32)
CO2 SERPL-SCNC: 24 MMOL/L (ref 21–32)
CREAT SERPL-MCNC: 4.53 MG/DL (ref 0.5–1.05)
CREAT SERPL-MCNC: 4.57 MG/DL (ref 0.5–1.05)
EGFRCR SERPLBLD CKD-EPI 2021: 10 ML/MIN/1.73M*2
EGFRCR SERPLBLD CKD-EPI 2021: 10 ML/MIN/1.73M*2
EOSINOPHIL # BLD AUTO: 0 X10*3/UL (ref 0–0.7)
EOSINOPHIL NFR BLD AUTO: 0 %
ERYTHROCYTE [DISTWIDTH] IN BLOOD BY AUTOMATED COUNT: 13.8 % (ref 11.5–14.5)
EST. AVERAGE GLUCOSE BLD GHB EST-MCNC: 206 MG/DL
GLUCOSE BLD MANUAL STRIP-MCNC: 102 MG/DL (ref 74–99)
GLUCOSE BLD MANUAL STRIP-MCNC: 119 MG/DL (ref 74–99)
GLUCOSE BLD MANUAL STRIP-MCNC: 135 MG/DL (ref 74–99)
GLUCOSE BLD MANUAL STRIP-MCNC: 146 MG/DL (ref 74–99)
GLUCOSE BLD MANUAL STRIP-MCNC: 154 MG/DL (ref 74–99)
GLUCOSE BLD MANUAL STRIP-MCNC: 161 MG/DL (ref 74–99)
GLUCOSE BLD MANUAL STRIP-MCNC: 48 MG/DL (ref 74–99)
GLUCOSE BLDA-MCNC: 177 MG/DL (ref 74–99)
GLUCOSE BLDA-MCNC: 60 MG/DL (ref 74–99)
GLUCOSE SERPL-MCNC: 118 MG/DL (ref 74–99)
GLUCOSE SERPL-MCNC: 139 MG/DL (ref 74–99)
HBA1C MFR BLD: 8.8 %
HCO3 BLDA-SCNC: 27 MMOL/L (ref 22–26)
HCO3 BLDA-SCNC: 27.1 MMOL/L (ref 22–26)
HCT VFR BLD AUTO: 33.9 % (ref 36–46)
HCT VFR BLD EST: 33 % (ref 36–46)
HCT VFR BLD EST: 34 % (ref 36–46)
HGB BLD-MCNC: 10.8 G/DL (ref 12–16)
HGB BLDA-MCNC: 11 G/DL (ref 12–16)
HGB BLDA-MCNC: 11.3 G/DL (ref 12–16)
HOLD SPECIMEN: NORMAL
HOLD SPECIMEN: NORMAL
IMM GRANULOCYTES # BLD AUTO: 0.06 X10*3/UL (ref 0–0.7)
IMM GRANULOCYTES NFR BLD AUTO: 1.1 % (ref 0–0.9)
INHALED O2 CONCENTRATION: 45 %
INHALED O2 CONCENTRATION: 45 %
LACTATE BLDA-SCNC: 0.4 MMOL/L (ref 0.4–2)
LACTATE BLDA-SCNC: 0.4 MMOL/L (ref 0.4–2)
LYMPHOCYTES # BLD AUTO: 0.45 X10*3/UL (ref 1.2–4.8)
LYMPHOCYTES NFR BLD AUTO: 8 %
MAGNESIUM SERPL-MCNC: 2.21 MG/DL (ref 1.6–2.4)
MCH RBC QN AUTO: 31.6 PG (ref 26–34)
MCHC RBC AUTO-ENTMCNC: 31.9 G/DL (ref 32–36)
MCV RBC AUTO: 99 FL (ref 80–100)
MONOCYTES # BLD AUTO: 0.59 X10*3/UL (ref 0.1–1)
MONOCYTES NFR BLD AUTO: 10.5 %
NEUTROPHILS # BLD AUTO: 4.53 X10*3/UL (ref 1.2–7.7)
NEUTROPHILS NFR BLD AUTO: 80.2 %
NRBC BLD-RTO: 0 /100 WBCS (ref 0–0)
OXYHGB MFR BLDA: 94.5 % (ref 94–98)
OXYHGB MFR BLDA: 95.5 % (ref 94–98)
PCO2 BLDA: 69 MM HG (ref 38–42)
PCO2 BLDA: 76 MM HG (ref 38–42)
PH BLDA: 7.16 PH (ref 7.38–7.42)
PH BLDA: 7.2 PH (ref 7.38–7.42)
PLATELET # BLD AUTO: 185 X10*3/UL (ref 150–450)
PO2 BLDA: 79 MM HG (ref 85–95)
PO2 BLDA: 82 MM HG (ref 85–95)
POTASSIUM BLDA-SCNC: 4.8 MMOL/L (ref 3.5–5.3)
POTASSIUM BLDA-SCNC: 5 MMOL/L (ref 3.5–5.3)
POTASSIUM SERPL-SCNC: 4.7 MMOL/L (ref 3.5–5.3)
POTASSIUM SERPL-SCNC: 4.9 MMOL/L (ref 3.5–5.3)
PROT SERPL-MCNC: 5.5 G/DL (ref 6.4–8.2)
RBC # BLD AUTO: 3.42 X10*6/UL (ref 4–5.2)
SAO2 % BLDA: 97 % (ref 94–100)
SAO2 % BLDA: 98 % (ref 94–100)
SODIUM BLDA-SCNC: 126 MMOL/L (ref 136–145)
SODIUM BLDA-SCNC: 127 MMOL/L (ref 136–145)
SODIUM SERPL-SCNC: 127 MMOL/L (ref 136–145)
SODIUM SERPL-SCNC: 129 MMOL/L (ref 136–145)
SPECIMEN DRAWN FROM PATIENT: ABNORMAL
SPECIMEN DRAWN FROM PATIENT: ABNORMAL
TSH SERPL-ACNC: 49 MIU/L (ref 0.44–3.98)
WBC # BLD AUTO: 5.6 X10*3/UL (ref 4.4–11.3)

## 2025-01-01 PROCEDURE — 94660 CPAP INITIATION&MGMT: CPT

## 2025-01-01 PROCEDURE — 84443 ASSAY THYROID STIM HORMONE: CPT | Performed by: HOSPITALIST

## 2025-01-01 PROCEDURE — 1200000002 HC GENERAL ROOM WITH TELEMETRY DAILY

## 2025-01-01 PROCEDURE — 82330 ASSAY OF CALCIUM: CPT | Performed by: HOSPITALIST

## 2025-01-01 PROCEDURE — 71045 X-RAY EXAM CHEST 1 VIEW: CPT | Performed by: RADIOLOGY

## 2025-01-01 PROCEDURE — 82550 ASSAY OF CK (CPK): CPT | Performed by: HOSPITALIST

## 2025-01-01 PROCEDURE — 71045 X-RAY EXAM CHEST 1 VIEW: CPT

## 2025-01-01 PROCEDURE — 82435 ASSAY OF BLOOD CHLORIDE: CPT | Performed by: HOSPITALIST

## 2025-01-01 PROCEDURE — 85025 COMPLETE CBC W/AUTO DIFF WBC: CPT | Performed by: HOSPITALIST

## 2025-01-01 PROCEDURE — 37799 UNLISTED PX VASCULAR SURGERY: CPT | Performed by: HOSPITALIST

## 2025-01-01 PROCEDURE — 2500000004 HC RX 250 GENERAL PHARMACY W/ HCPCS (ALT 636 FOR OP/ED): Performed by: HOSPITALIST

## 2025-01-01 PROCEDURE — 2500000001 HC RX 250 WO HCPCS SELF ADMINISTERED DRUGS (ALT 637 FOR MEDICARE OP): Performed by: HOSPITALIST

## 2025-01-01 PROCEDURE — 82947 ASSAY GLUCOSE BLOOD QUANT: CPT | Performed by: HOSPITALIST

## 2025-01-01 PROCEDURE — 82947 ASSAY GLUCOSE BLOOD QUANT: CPT

## 2025-01-01 PROCEDURE — 2500000002 HC RX 250 W HCPCS SELF ADMINISTERED DRUGS (ALT 637 FOR MEDICARE OP, ALT 636 FOR OP/ED): Performed by: HOSPITALIST

## 2025-01-01 PROCEDURE — 83735 ASSAY OF MAGNESIUM: CPT | Performed by: HOSPITALIST

## 2025-01-01 PROCEDURE — 36600 WITHDRAWAL OF ARTERIAL BLOOD: CPT

## 2025-01-01 PROCEDURE — 83036 HEMOGLOBIN GLYCOSYLATED A1C: CPT | Mod: AHULAB | Performed by: HOSPITALIST

## 2025-01-01 PROCEDURE — 94640 AIRWAY INHALATION TREATMENT: CPT

## 2025-01-01 PROCEDURE — 99223 1ST HOSP IP/OBS HIGH 75: CPT | Performed by: HOSPITALIST

## 2025-01-01 PROCEDURE — 2500000005 HC RX 250 GENERAL PHARMACY W/O HCPCS: Performed by: HOSPITALIST

## 2025-01-01 PROCEDURE — 2500000005 HC RX 250 GENERAL PHARMACY W/O HCPCS: Performed by: INTERNAL MEDICINE

## 2025-01-01 RX ORDER — HEPARIN SODIUM 5000 [USP'U]/ML
5000 INJECTION, SOLUTION INTRAVENOUS; SUBCUTANEOUS EVERY 8 HOURS SCHEDULED
Status: DISCONTINUED | OUTPATIENT
Start: 2025-01-01 | End: 2025-01-08 | Stop reason: HOSPADM

## 2025-01-01 RX ORDER — IPRATROPIUM BROMIDE AND ALBUTEROL SULFATE 2.5; .5 MG/3ML; MG/3ML
3 SOLUTION RESPIRATORY (INHALATION)
Status: DISCONTINUED | OUTPATIENT
Start: 2025-01-01 | End: 2025-01-08 | Stop reason: HOSPADM

## 2025-01-01 RX ORDER — ALBUTEROL SULFATE 90 UG/1
2 INHALANT RESPIRATORY (INHALATION) EVERY 4 HOURS
Status: DISCONTINUED | OUTPATIENT
Start: 2025-01-01 | End: 2025-01-01 | Stop reason: CLARIF

## 2025-01-01 RX ORDER — IPRATROPIUM BROMIDE AND ALBUTEROL SULFATE 2.5; .5 MG/3ML; MG/3ML
3 SOLUTION RESPIRATORY (INHALATION) EVERY 2 HOUR PRN
Status: DISCONTINUED | OUTPATIENT
Start: 2025-01-01 | End: 2025-01-08 | Stop reason: HOSPADM

## 2025-01-01 RX ORDER — IPRATROPIUM BROMIDE AND ALBUTEROL SULFATE 2.5; .5 MG/3ML; MG/3ML
3 SOLUTION RESPIRATORY (INHALATION)
Status: DISCONTINUED | OUTPATIENT
Start: 2025-01-01 | End: 2025-01-01

## 2025-01-01 RX ADMIN — FERROUS GLUCONATE 324 MG: 324 TABLET ORAL at 08:57

## 2025-01-01 RX ADMIN — METOPROLOL SUCCINATE 50 MG: 50 TABLET, EXTENDED RELEASE ORAL at 08:57

## 2025-01-01 RX ADMIN — LEVOTHYROXINE SODIUM 250 MCG: 0.12 TABLET ORAL at 08:56

## 2025-01-01 RX ADMIN — ASPIRIN 81 MG: 81 TABLET, COATED ORAL at 08:57

## 2025-01-01 RX ADMIN — PANTOPRAZOLE SODIUM 40 MG: 40 TABLET, DELAYED RELEASE ORAL at 21:20

## 2025-01-01 RX ADMIN — CLOPIDOGREL 75 MG: 75 TABLET ORAL at 08:57

## 2025-01-01 RX ADMIN — Medication 45 PERCENT: at 08:00

## 2025-01-01 RX ADMIN — DEXTROSE MONOHYDRATE 25 G: 25 INJECTION, SOLUTION INTRAVENOUS at 05:33

## 2025-01-01 RX ADMIN — AMLODIPINE BESYLATE 5 MG: 5 TABLET ORAL at 08:57

## 2025-01-01 RX ADMIN — ATORVASTATIN CALCIUM 40 MG: 40 TABLET, FILM COATED ORAL at 08:57

## 2025-01-01 RX ADMIN — METOPROLOL SUCCINATE 50 MG: 50 TABLET, EXTENDED RELEASE ORAL at 21:20

## 2025-01-01 RX ADMIN — INSULIN GLARGINE 10 UNITS: 100 INJECTION, SOLUTION SUBCUTANEOUS at 21:20

## 2025-01-01 RX ADMIN — AMIODARONE HYDROCHLORIDE 200 MG: 200 TABLET ORAL at 08:56

## 2025-01-01 RX ADMIN — VENLAFAXINE HYDROCHLORIDE 150 MG: 75 CAPSULE, EXTENDED RELEASE ORAL at 08:57

## 2025-01-01 RX ADMIN — PANTOPRAZOLE SODIUM 40 MG: 40 TABLET, DELAYED RELEASE ORAL at 08:57

## 2025-01-01 RX ADMIN — IPRATROPIUM BROMIDE AND ALBUTEROL SULFATE 3 ML: 2.5; .5 SOLUTION RESPIRATORY (INHALATION) at 13:03

## 2025-01-01 RX ADMIN — Medication 45 PERCENT: at 03:16

## 2025-01-01 RX ADMIN — HEPARIN SODIUM 5000 UNITS: 5000 INJECTION, SOLUTION INTRAVENOUS; SUBCUTANEOUS at 13:30

## 2025-01-01 RX ADMIN — ACETAMINOPHEN 650 MG: 325 TABLET, FILM COATED ORAL at 18:17

## 2025-01-01 RX ADMIN — IPRATROPIUM BROMIDE AND ALBUTEROL SULFATE 3 ML: 2.5; .5 SOLUTION RESPIRATORY (INHALATION) at 20:09

## 2025-01-01 RX ADMIN — INSULIN LISPRO 4 UNITS: 100 INJECTION, SOLUTION INTRAVENOUS; SUBCUTANEOUS at 13:30

## 2025-01-01 RX ADMIN — DEXTROSE MONOHYDRATE 12.5 G: 25 INJECTION, SOLUTION INTRAVENOUS at 00:00

## 2025-01-01 RX ADMIN — METHYLPREDNISOLONE SODIUM SUCCINATE 40 MG: 40 INJECTION, POWDER, FOR SOLUTION INTRAMUSCULAR; INTRAVENOUS at 12:22

## 2025-01-01 RX ADMIN — HEPARIN SODIUM 5000 UNITS: 5000 INJECTION, SOLUTION INTRAVENOUS; SUBCUTANEOUS at 21:19

## 2025-01-01 RX ADMIN — HEPARIN SODIUM 5000 UNITS: 5000 INJECTION, SOLUTION INTRAVENOUS; SUBCUTANEOUS at 05:33

## 2025-01-01 RX ADMIN — METHYLPREDNISOLONE SODIUM SUCCINATE 40 MG: 40 INJECTION, POWDER, FOR SOLUTION INTRAMUSCULAR; INTRAVENOUS at 18:17

## 2025-01-01 RX ADMIN — METHYLPREDNISOLONE SODIUM SUCCINATE 40 MG: 40 INJECTION, POWDER, FOR SOLUTION INTRAMUSCULAR; INTRAVENOUS at 05:33

## 2025-01-01 RX ADMIN — Medication 3 L/MIN: at 20:13

## 2025-01-01 RX ADMIN — IPRATROPIUM BROMIDE AND ALBUTEROL SULFATE 3 ML: 2.5; .5 SOLUTION RESPIRATORY (INHALATION) at 06:56

## 2025-01-01 SDOH — SOCIAL STABILITY: SOCIAL INSECURITY: DO YOU FEEL UNSAFE GOING BACK TO THE PLACE WHERE YOU ARE LIVING?: UNABLE TO ASSESS

## 2025-01-01 SDOH — SOCIAL STABILITY: SOCIAL INSECURITY: DOES ANYONE TRY TO KEEP YOU FROM HAVING/CONTACTING OTHER FRIENDS OR DOING THINGS OUTSIDE YOUR HOME?: UNABLE TO ASSESS

## 2025-01-01 SDOH — SOCIAL STABILITY: SOCIAL INSECURITY: HAS ANYONE EVER THREATENED TO HURT YOUR FAMILY OR YOUR PETS?: UNABLE TO ASSESS

## 2025-01-01 SDOH — SOCIAL STABILITY: SOCIAL INSECURITY: HAVE YOU HAD ANY THOUGHTS OF HARMING ANYONE ELSE?: UNABLE TO ASSESS

## 2025-01-01 SDOH — SOCIAL STABILITY: SOCIAL INSECURITY: ARE YOU OR HAVE YOU BEEN THREATENED OR ABUSED PHYSICALLY, EMOTIONALLY, OR SEXUALLY BY ANYONE?: UNABLE TO ASSESS

## 2025-01-01 SDOH — SOCIAL STABILITY: SOCIAL INSECURITY: ARE THERE ANY APPARENT SIGNS OF INJURIES/BEHAVIORS THAT COULD BE RELATED TO ABUSE/NEGLECT?: UNABLE TO ASSESS

## 2025-01-01 SDOH — SOCIAL STABILITY: SOCIAL INSECURITY: DO YOU FEEL ANYONE HAS EXPLOITED OR TAKEN ADVANTAGE OF YOU FINANCIALLY OR OF YOUR PERSONAL PROPERTY?: UNABLE TO ASSESS

## 2025-01-01 SDOH — SOCIAL STABILITY: SOCIAL INSECURITY: ABUSE: ADULT

## 2025-01-01 ASSESSMENT — ACTIVITIES OF DAILY LIVING (ADL)
ADEQUATE_TO_COMPLETE_ADL: UNABLE TO ASSESS
FEEDING YOURSELF: UNABLE TO ASSESS
BATHING: UNABLE TO ASSESS
DRESSING YOURSELF: UNABLE TO ASSESS
PATIENT'S MEMORY ADEQUATE TO SAFELY COMPLETE DAILY ACTIVITIES?: UNABLE TO ASSESS
GROOMING: UNABLE TO ASSESS
ASSISTIVE_DEVICE: WALKER
JUDGMENT_ADEQUATE_SAFELY_COMPLETE_DAILY_ACTIVITIES: UNABLE TO ASSESS
HEARING - LEFT EAR: HEARING AID
HEARING - RIGHT EAR: HEARING AID
TOILETING: UNABLE TO ASSESS
WALKS IN HOME: UNABLE TO ASSESS

## 2025-01-01 ASSESSMENT — PAIN - FUNCTIONAL ASSESSMENT
PAIN_FUNCTIONAL_ASSESSMENT: 0-10
PAIN_FUNCTIONAL_ASSESSMENT: 0-10

## 2025-01-01 ASSESSMENT — LIFESTYLE VARIABLES
HOW OFTEN DO YOU HAVE 6 OR MORE DRINKS ON ONE OCCASION: PATIENT UNABLE TO ANSWER
AUDIT-C TOTAL SCORE: -1
AUDIT-C TOTAL SCORE: -1
HOW MANY STANDARD DRINKS CONTAINING ALCOHOL DO YOU HAVE ON A TYPICAL DAY: PATIENT UNABLE TO ANSWER
SKIP TO QUESTIONS 9-10: 0
HOW OFTEN DO YOU HAVE A DRINK CONTAINING ALCOHOL: PATIENT UNABLE TO ANSWER

## 2025-01-01 ASSESSMENT — COGNITIVE AND FUNCTIONAL STATUS - GENERAL
PERSONAL GROOMING: A LITTLE
WALKING IN HOSPITAL ROOM: A LITTLE
MOVING TO AND FROM BED TO CHAIR: A LITTLE
TURNING FROM BACK TO SIDE WHILE IN FLAT BAD: A LITTLE
PATIENT BASELINE BEDBOUND: NO
DRESSING REGULAR UPPER BODY CLOTHING: A LITTLE
EATING MEALS: A LITTLE
MOVING FROM LYING ON BACK TO SITTING ON SIDE OF FLAT BED WITH BEDRAILS: A LITTLE
DRESSING REGULAR LOWER BODY CLOTHING: A LITTLE
TOILETING: A LITTLE
HELP NEEDED FOR BATHING: A LITTLE
STANDING UP FROM CHAIR USING ARMS: A LITTLE
CLIMB 3 TO 5 STEPS WITH RAILING: A LITTLE
MOBILITY SCORE: 18
DAILY ACTIVITIY SCORE: 18

## 2025-01-01 ASSESSMENT — PAIN SCALES - GENERAL
PAINLEVEL_OUTOF10: 0 - NO PAIN
PAINLEVEL_OUTOF10: 0 - NO PAIN

## 2025-01-01 NOTE — CONSULTS
"Consult Note    Patient is a 69 y.o. female with a past medical history of hypertension, coronary artery disease, congestive heart failure, peripheral vascular disease, diabetes, atrial fibrillation, COPD, chronic hypoxia-on 3 L oxygen, hypothyroidism, left CVA, chronic renal failure-on dialysis, anemia, depression, right pleural effusion, DJD, renal cell carcinoma-status post partial nephrectomy, TIA, vasculitis, left atrial thrombus and obstructive sleep apnea-on CPAP, who was admitted to [facility] in transfer from [facility] on December 30, 2024 after being found down on the floor for several hours, with acute-on-chronic hypoxic and hypercapnic respiratory failure.  On admission to [facility] she was noted to have a temperature of 36.4, pulse 74, respiratory rate 26, blood pressure-116/62 and oxygen saturation of 100% on an unknown FiO2.  She was found to have a normal CBC, low sodium and chloride, elevated BUN, creatinine and AST, normal lipase and lactate, elevated troponin and BNP (1735), elevated CPK (1137), elevated TSH, negative COVID-19 PCR, but a positive influenza A test, venous blood gas showing a pH of 7.09, pCO2 92 and a bicarbonate of 28 and an admission chest x-ray showing cardiomegaly with a moderate right pleural effusion with right basilar compressive atelectasis.  A CT scan of the head showed no acute changes.  The patient was treated with DuoNeb, Solu-Medrol, subcutaneous heparin, Plavix and amiodarone, and placed on BiPAP (18/8 with a rate of 18 and 45% FiO2).  Presently, the patient feels \"okay\".  She is somewhat of a difficult historian since she is on BiPAP at the present time.  She denies fevers, chills, sweats, shortness of breath at rest, dyspnea on exertion, chest pain, cough, postnasal drip or weight loss, but does admit to bilateral hip pain and to wheezing.  She states that she does not use inhalers at home but does have home oxygen and uses CPAP at night.    Past " medical history: As above.    Allergies: Per EMR.    Medications: Per EMR.    Social history:  Cigarettes-smoked a half a pack a day for 45 years.  Alcohol-occasional.  Pets-cat.  Having travel history-noncontributory.  Occupation-worked as a  in a factory.    Family history:  Pulm disease-none.    Coronary disease-grandmother.  Hypertension-mother.  Diabetes-father and daughter.  Cancer-Sister and brother (?type).    Review of systems:  Swallowing problems-denies.  GI-none.  -none.  Musculoskeletal-bilateral hip pain.  Skin-no rashes.    Visit Vitals  /67 (BP Location: Left arm, Patient Position: Lying)   Pulse 76   Temp 37.2 °C (98.9 °F) (Temporal)   Resp 19      Physical Exam:  Gen: Drowsy, but arousable, alert and in NAD; on BiPAP (18/8 with a rate of 18 and 45% FiO2) with an oxygen saturation of 98%..  HEENT:  Normal conjunctivae; the pharynx cannot be examined; no sinus tenderness.  Neck:  No thyroid enlargement or adenopathy.  Pulm:  Clear, without wheezes, rales or rhonchi.  Heart:  RRR without S3, S4 or murmurs.  Abd:  Soft, non-tender and with positive bowel sounds.  Extrem:  No clubbing; trace lower extremity edema.  Skin:  No rashes.    Scheduled medications  amiodarone, 200 mg, oral, Daily with breakfast  amLODIPine, 5 mg, oral, Daily  aspirin, 81 mg, oral, Daily  atorvastatin, 40 mg, oral, Daily  clopidogrel, 75 mg, oral, Daily  ferrous gluconate, 324 mg, oral, Daily with breakfast  heparin (porcine), 5,000 Units, subcutaneous, q8h SUMIT  insulin glargine, 10 Units, subcutaneous, Nightly  insulin lispro, 0-10 Units, subcutaneous, q4h  ipratropium-albuteroL, 3 mL, nebulization, TID  levothyroxine, 250 mcg, oral, Daily before breakfast  methylPREDNISolone sodium succinate (PF), 40 mg, intravenous, q8h  metoprolol succinate XL, 50 mg, oral, BID  oxygen, , inhalation, Continuous - Inhalation  pantoprazole, 40 mg, oral, BID  venlafaxine XR, 150 mg, oral, Daily      Continuous medications      PRN medications  PRN medications: acetaminophen **OR** acetaminophen **OR** acetaminophen, dextrose, dextrose, glucagon, glucagon, ipratropium-albuteroL, ondansetron ODT **OR** ondansetron     Results for orders placed or performed during the hospital encounter of 12/31/24 (from the past 96 hours)   Blood Gas Arterial Full Panel   Result Value Ref Range    POCT pH, Arterial 7.16 (LL) 7.38 - 7.42 pH    POCT pCO2, Arterial 76 (HH) 38 - 42 mm Hg    POCT pO2, Arterial 79 (L) 85 - 95 mm Hg    POCT SO2, Arterial 97 94 - 100 %    POCT Oxy Hemoglobin, Arterial 94.5 94.0 - 98.0 %    POCT Hematocrit Calculated, Arterial 33.0 (L) 36.0 - 46.0 %    POCT Sodium, Arterial 126 (L) 136 - 145 mmol/L    POCT Potassium, Arterial 4.8 3.5 - 5.3 mmol/L    POCT Chloride, Arterial 93 (L) 98 - 107 mmol/L    POCT Ionized Calcium, Arterial 0.94 (L) 1.10 - 1.33 mmol/L    POCT Glucose, Arterial 177 (H) 74 - 99 mg/dL    POCT Lactate, Arterial 0.4 0.4 - 2.0 mmol/L    POCT Base Excess, Arterial -2.8 (L) -2.0 - 3.0 mmol/L    POCT HCO3 Calculated, Arterial 27.1 (H) 22.0 - 26.0 mmol/L    POCT Hemoglobin, Arterial 11.0 (L) 12.0 - 16.0 g/dL    POCT Anion Gap, Arterial 11 10 - 25 mmo/L    Patient Temperature 37.0 degrees Celsius    FiO2 45 %    Site of Arterial Puncture Radial Left     Jonathan's Test Positive    POCT GLUCOSE   Result Value Ref Range    POCT Glucose 146 (H) 74 - 99 mg/dL   SST TOP   Result Value Ref Range    Extra Tube Hold for add-ons.    TSH   Result Value Ref Range    Thyroid Stimulating Hormone 49.00 (H) 0.44 - 3.98 mIU/L   Comprehensive Metabolic Panel   Result Value Ref Range    Glucose 118 (H) 74 - 99 mg/dL    Sodium 129 (L) 136 - 145 mmol/L    Potassium 4.9 3.5 - 5.3 mmol/L    Chloride 95 (L) 98 - 107 mmol/L    Bicarbonate 22 21 - 32 mmol/L    Anion Gap 17 10 - 20 mmol/L    Urea Nitrogen 64 (H) 6 - 23 mg/dL    Creatinine 4.53 (H) 0.50 - 1.05 mg/dL    eGFR 10 (L) >60 mL/min/1.73m*2    Calcium 6.3 (L) 8.6 - 10.3 mg/dL    Albumin 3.0  (L) 3.4 - 5.0 g/dL    Alkaline Phosphatase 83 33 - 136 U/L    Total Protein 5.5 (L) 6.4 - 8.2 g/dL    AST 36 9 - 39 U/L    Bilirubin, Total 0.4 0.0 - 1.2 mg/dL    ALT 13 7 - 45 U/L   Magnesium   Result Value Ref Range    Magnesium 2.21 1.60 - 2.40 mg/dL   CBC and Auto Differential   Result Value Ref Range    WBC 5.6 4.4 - 11.3 x10*3/uL    nRBC 0.0 0.0 - 0.0 /100 WBCs    RBC 3.42 (L) 4.00 - 5.20 x10*6/uL    Hemoglobin 10.8 (L) 12.0 - 16.0 g/dL    Hematocrit 33.9 (L) 36.0 - 46.0 %    MCV 99 80 - 100 fL    MCH 31.6 26.0 - 34.0 pg    MCHC 31.9 (L) 32.0 - 36.0 g/dL    RDW 13.8 11.5 - 14.5 %    Platelets 185 150 - 450 x10*3/uL    Neutrophils % 80.2 40.0 - 80.0 %    Immature Granulocytes %, Automated 1.1 (H) 0.0 - 0.9 %    Lymphocytes % 8.0 13.0 - 44.0 %    Monocytes % 10.5 2.0 - 10.0 %    Eosinophils % 0.0 0.0 - 6.0 %    Basophils % 0.2 0.0 - 2.0 %    Neutrophils Absolute 4.53 1.20 - 7.70 x10*3/uL    Immature Granulocytes Absolute, Automated 0.06 0.00 - 0.70 x10*3/uL    Lymphocytes Absolute 0.45 (L) 1.20 - 4.80 x10*3/uL    Monocytes Absolute 0.59 0.10 - 1.00 x10*3/uL    Eosinophils Absolute 0.00 0.00 - 0.70 x10*3/uL    Basophils Absolute 0.01 0.00 - 0.10 x10*3/uL   Creatine Kinase   Result Value Ref Range    Creatine Kinase 722 (H) 0 - 215 U/L   Blood Gas Arterial Full Panel   Result Value Ref Range    POCT pH, Arterial 7.20 (LL) 7.38 - 7.42 pH    POCT pCO2, Arterial 69 (H) 38 - 42 mm Hg    POCT pO2, Arterial 82 (L) 85 - 95 mm Hg    POCT SO2, Arterial 98 94 - 100 %    POCT Oxy Hemoglobin, Arterial 95.5 94.0 - 98.0 %    POCT Hematocrit Calculated, Arterial 34.0 (L) 36.0 - 46.0 %    POCT Sodium, Arterial 127 (L) 136 - 145 mmol/L    POCT Potassium, Arterial 5.0 3.5 - 5.3 mmol/L    POCT Chloride, Arterial 94 (L) 98 - 107 mmol/L    POCT Ionized Calcium, Arterial 0.91 (L) 1.10 - 1.33 mmol/L    POCT Glucose, Arterial 60 (L) 74 - 99 mg/dL    POCT Lactate, Arterial 0.4 0.4 - 2.0 mmol/L    POCT Base Excess, Arterial -2.2 (L) -2.0 -  3.0 mmol/L    POCT HCO3 Calculated, Arterial 27.0 (H) 22.0 - 26.0 mmol/L    POCT Hemoglobin, Arterial 11.3 (L) 12.0 - 16.0 g/dL    POCT Anion Gap, Arterial 11 10 - 25 mmo/L    Patient Temperature 37.0 degrees Celsius    FiO2 45 %    Site of Arterial Puncture Radial Left    POCT GLUCOSE   Result Value Ref Range    POCT Glucose 48 (L) 74 - 99 mg/dL   Lavender Top   Result Value Ref Range    Extra Tube Hold for add-ons.    Basic metabolic panel   Result Value Ref Range    Glucose 139 (H) 74 - 99 mg/dL    Sodium 127 (L) 136 - 145 mmol/L    Potassium 4.7 3.5 - 5.3 mmol/L    Chloride 93 (L) 98 - 107 mmol/L    Bicarbonate 24 21 - 32 mmol/L    Anion Gap 15 10 - 20 mmol/L    Urea Nitrogen 62 (H) 6 - 23 mg/dL    Creatinine 4.57 (H) 0.50 - 1.05 mg/dL    eGFR 10 (L) >60 mL/min/1.73m*2    Calcium 6.4 (L) 8.6 - 10.3 mg/dL   POCT GLUCOSE   Result Value Ref Range    POCT Glucose 119 (H) 74 - 99 mg/dL     *Note: Due to a large number of results and/or encounters for the requested time period, some results have not been displayed. A complete set of results can be found in Results Review.        XR chest 1 view    Result Date: 1/1/2025  Interpreted By:  Beulah Chacon, STUDY: XR CHEST 1 VIEW;  1/1/2025 3:36 am   INDICATION: Signs/Symptoms:respiratory failure, Influenza     COMPARISON: Chest x-ray 12/29/2024   ACCESSION NUMBER(S): [id number]   ORDERING CLINICIAN: ANTHONY CASH   TECHNIQUE: Portable semi-upright frontal view of the chest was obtained .   FINDINGS: Monitoring wires are overlying the patient.   The heart is enlarged. There is a left atrial appendage closure device. There is mild vascular congestion. Atherosclerotic calcifications are present at the aortic arch.   There is a moderate size right pleural effusion with atelectasis/consolidation at the right lung base. No pneumothorax.       1. Cardiomegaly. Mild vascular congestion. Moderate right pleural effusion with atelectasis/consolidation at the right lung base.        MACRO: None.   Signed by: Beulah Chacon 1/1/2025 3:48 AM Dictation workstation:   [id number]    XR forearm right 2 views    Result Date: 12/31/2024  STUDY: Forearm Radiographs; 12/31/2024 11:11AM INDICATION: Fall, skin tear to the right arm. COMPARISON: 4/29/2024 XR Forearm right. ACCESSION NUMBER(S): [id number] ORDERING CLINICIAN: MEAGNA ELY TECHNIQUE:  Two view(s) of the right forearm. FINDINGS:  There is no displaced fracture.  The alignment is anatomic.  Soft tissue swelling about the mid forearm noted..    No acute osseous findings. Signed by Ashish Otero II, MD    CT cervical spine wo IV contrast    Result Date: 12/31/2024  Interpreted By:  Vinh Bautista, STUDY: CT CERVICAL SPINE WO IV CONTRAST; 12/31/2024 11:05 am   INDICATION: Signs/Symptoms:fall.   COMPARISON: CT dated 05/14/2024   ACCESSION NUMBER(S): [id number]   ORDERING CLINICIAN: MEAGAN ELY   TECHNIQUE: Contiguous axial CT images were obtained at  2 mm slice thickness through the cervical spine without contrast administration. The images were then reconstructed in the coronal and sagittal planes.   FINDINGS: There is no CT evidence of acute fracture identified. No prevertebral soft tissue swelling is identified.   ALIGNMENT: The vertebral bodies are well aligned without evidence of subluxation.   VERTEBRAE/DISC SPACES: Moderate disc space narrowing and marginal osteophyte formation is seen at the C6-7 level. Moderate to severe facet degenerative changes are seen throughout the cervical spine.   ADDITIONAL FINDINGS: Evaluation of the visualized soft tissues of the neck is limited by the lack of intravenous contrast.  Within this limitation, no gross mass or lymphadenopathy is identified.       1.  No CT evidence of acute fracture. 2.  Degenerative changes throughout the cervical spine, as above.   Signed by: Vinh Bautista 12/31/2024 11:47 AM Dictation workstation:   [id number]    CT head wo IV contrast    Result Date:  12/31/2024  Interpreted By:  Vinh Bautista, STUDY: CT HEAD WO IV CONTRAST; 12/31/2024 11:05 am   INDICATION: Signs/Symptoms:fall.   COMPARISON: CT head dated 05/14/2024   ACCESSION NUMBER(S): [id number]   ORDERING CLINICIAN: MEAGAN ELY   TECHNIQUE: Contiguous axial CT images were obtained through the head at 5 mm slice thickness without contrast administration.   FINDINGS: INTRACRANIAL: The ventricles, sulci and basal cisterns are within normal limits for size and configuration. The grey-white differentiation is intact. There is no mass effect or midline shift. There is no extraaxial fluid collection. There is no intracranial hemorrhage.  The calvarium is unremarkable.   EXTRACRANIAL: Visualized paranasal sinuses and mastoids are clear.       No evidence of acute cortical infarct or intracranial hemorrhage.   MACRO: None     Signed by: Vinh Bautista 12/31/2024 11:41 AM Dictation workstation:   [id number]    ECG 12 lead    Result Date: 12/31/2024  Normal sinus rhythm Left axis deviation Left bundle branch block Abnormal ECG When compared with ECG of 26-DEC-2024 08:17, Left bundle branch block is now Present Criteria for Anterior infarct are no longer Present Criteria for Anterolateral infarct are no longer Present Criteria for Inferior infarct are no longer Present    XR chest 1 view    Result Date: 12/29/2024  Interpreted By:  Diomedes Peng, STUDY: XR CHEST 1 VIEW;  12/29/2024 6:38 pm   INDICATION: Signs/Symptoms:increased sob.   COMPARISON: 12/26/2024   ACCESSION NUMBER(S): [id number]   ORDERING CLINICIAN: AFSANEH ADKINS   FINDINGS: There is stable cardiomegaly. There is pulmonary vascular congestive change. There is right pleural effusion and right-sided atelectasis airspace disease. No pneumothorax.       Cardiomegaly with pulmonary vascular congestive change, right pleural effusion and right side atelectasis or airspace disease.   MACRO: None   Signed by: Diomedes Peng 12/29/2024 6:49 PM Dictation  workstation:   [id number]    Vascular US Carotid Artery Duplex Bilateral    Result Date: 12/29/2024          [facility] [address] [address]  Tel [phone] and Fax [phone]  Vascular Lab Report [facility] US CAROTID ARTERY DUPLEX BILATERAL  Patient Name:      MOE MAYA      Reading Physician:  [id number] Robert Henderson MD, RPVI Study Date:        12/27/2024           Ordering Physician: [id number] TONIA MONTANA MRN/PID:           [id number]             Technologist:       Porsche Marin                                                             RVT Accession#:        [id number]         Technologist 2: Date of Birth/Age: 1955 / 69 years Encounter#:         [id number] Gender:            F Admission Status:  Inpatient            Location Performed: [facility]  Diagnosis/ICD: Syncope and collapse-R55 CPT Codes:     60617 Cerebrovascular Carotid Duplex scan complete  CONCLUSIONS: Right Carotid: Findings are consistent with less than 50% stenosis of the right proximal internal carotid artery. Right external carotid artery appears patent with no evidence of stenosis. The right vertebral artery is patent with antegrade flow. No evidence of hemodynamically significant stenosis in the right subclavian artery. Dampened waveform noted in the right subclavian artery may be suggestive of a more proximal stenosis. Left Carotid: Findings are consistent with less than 50% stenosis of the left proximal internal carotid artery. Laminar flow seen by color Doppler. Left external carotid artery appears patent with no evidence of stenosis. The left vertebral artery is patent with antegrade flow. There is a >50% stenosis noted in the left subclavian artery.  Additional Findings: Technically difficult study due to patient respirations.  Imaging & Doppler Findings: Right  Plaque Morph: The proximal right internal carotid artery demonstrates heterogenous and calcified plaque. The proximal right external carotid artery demonstrates heterogenous and calcified plaque. The mid right common carotid artery demonstrates heterogenous and calcified plaque. The distal right common carotid artery demonstrates heterogenous and calcified plaque. Left Plaque Morph: The proximal left internal carotid artery demonstrates heterogenous and calcified plaque. The proximal left external carotid artery demonstrates heterogenous and calcified plaque. The mid left common carotid artery demonstrates heterogenous and calcified plaque. The distal left common carotid artery demonstrates heterogenous and calcified plaque.   Right                        Left   PSV      EDV                PSV      EDV 71 cm/s            CCA P    75 cm/s 83 cm/s            CCA D    78 cm/s 123 cm/s 24 cm/s   ICA P    117 cm/s 25 cm/s 145 cm/s           ICA M    109 cm/s 108 cm/s           ICA D    89 cm/s 110 cm/s            ECA     131 cm/s 66 cm/s          Vertebral  50 cm/s 191 cm/s         Subclavian 270 cm/s               Right Left ICA/CCA Ratio  1.5  1.5   [id number] Robert Henderson MD, RPVI Electronically signed by [id number] Robert Henderson MD, RPVI on 12/29/2024 at 3:11:38 PM  ** Final **     ECG 12 lead    Result Date: 12/28/2024  Normal sinus rhythm Left axis deviation Minimal voltage criteria for LVH, may be normal variant ( [place] product ) Inferior infarct , age undetermined Anterolateral infarct (cited on or before 26-JUN-2024) Abnormal ECG When compared with ECG of 20-DEC-2024 10:53, No significant change was found See ED provider note for full interpretation and clinical correlation Confirmed by Rose Viera ([id number]) on 12/28/2024 9:59:04 AM    Transthoracic Echo Limited    Result Date: 12/27/2024   [facility], [address] [id number]               Tel [phone] and Fax [phone] TRANSTHORACIC  ECHOCARDIOGRAM REPORT  Patient Name:       MOE MAYA     Reading Physician:    [id number] Nicholas Antonio MD Study Date:         12/27/2024          Ordering Provider:    [id number] DOMONIQUE PURCELL MRN/PID:            [id number]            Fellow: Accession#:         [id number]        Nurse: Date of Birth/Age:  1955 / 69      Sonographer:          Nina mclain RDCS Gender assigned at  F                   Additional Staff: Birth: Height:             167.64 cm           Admit Date:           12/26/2024 Weight:             71.21 kg            Admission Status:     Inpatient -                                                               Routine BSA / BMI:          1.80 m2 / 25.34     Encounter#:           [id number]                     kg/m2 Blood Pressure:     158/90 mmHg         Department Location:  [facility]                                                               Invasive Study Type:    TRANSTHORACIC ECHO (TTE) LIMITED Diagnosis/ICD: Syncope and collapse-R55; Other pericardial effusion                (noninflammatory)-I31.39 Indication:    Syncope, Pericardial effusion CPT Code:      Echo Limited-74285; Doppler Limited-67367; Color Doppler-46362 Patient History: Smoker:            Current. Diabetes:          Yes Pertinent History: CHF, HTN, PVD, Hyperlipidemia, A-Fib, CVA, CAD and COPD. Study Detail: The following Echo studies were performed: 2D, Doppler and color               flow.  PHYSICIAN INTERPRETATION: Left Ventricle: The left ventricular systolic function is normal, with a visually estimated ejection fraction of 55-60%. There is severely increased concentric left ventricular hypertrophy. There are no regional left ventricular wall motion abnormalities. The left ventricular cavity size is normal.  There is severely increased septal and severely increased posterior left ventricular wall thickness. Spectral Doppler shows a Grade II (pseudonormal pattern) of left ventricular diastolic filling with an elevated left atrial pressure. Left Atrium: The left atrium is moderately dilated. Right Ventricle: The right ventricle is normal in size. There is normal right ventricular global systolic function. Right Atrium: The right atrium is mildly dilated. Aortic Valve: The aortic valve is trileaflet. There is no evidence of aortic valve regurgitation. Mitral Valve: The mitral valve is normal in structure. There is mild mitral valve regurgitation. Tricuspid Valve: The tricuspid valve is structurally normal. No evidence of tricuspid regurgitation. Pulmonic Valve: The pulmonic valve is structurally normal. There is physiologic pulmonic valve regurgitation. Pericardium: Moderate pericardial effusion. The effusion is circumferential. There is no evidence of cardiac tamponade. Aorta: The aortic root is normal. Pulmonary Veins: The pulmonary veins appear normal and return normally to the left atrium. Systemic Veins: The inferior vena cava appears normal in size, with IVC inspiratory collapse greater than 50%.  CONCLUSIONS:  1. The left ventricular systolic function is normal, with a visually estimated ejection fraction of 55-60%.  2. Spectral Doppler shows a Grade II (pseudonormal pattern) of left ventricular diastolic filling with an elevated left atrial pressure.  3. There is severely increased septal and severely increased posterior left ventricular wall thickness.  4. There is severely increased concentric left ventricular hypertrophy.  5. There is normal right ventricular global systolic function.  6. The left atrium is moderately dilated.  7. Moderate pericardial effusion.  8. There is no evidence of cardiac tamponade. QUANTITATIVE DATA SUMMARY:  2D MEASUREMENTS:          Normal Ranges: IVSd:            1.81 cm  (0.6-1.1cm)  LVPWd:           1.72 cm  (0.6-1.1cm) LVIDd:           3.39 cm  (3.9-5.9cm) LVIDs:           2.54 cm LV Mass Index:   135 g/m2 LVEDV Index:     32 ml/m2 LV % FS          25.0 %  LV SYSTOLIC FUNCTION BY 2D PLANIMETRY (MOD):                      Normal Ranges: EF-A4C View:    55 % (>=55%) EF-A2C View:    55 % EF-Biplane:     56 % EF-Visual:      58 % LV EF Reported: 58 %  LV DIASTOLIC FUNCTION:          Normal Ranges: MV Peak E:             1.02 m/s (0.7-1.2 m/s) MV Peak A:             0.40 m/s (0.42-0.7 m/s) E/A Ratio:             2.53     (1.0-2.2)  MITRAL VALVE:          Normal Ranges: MV DT:        178 msec (150-240msec)  TRICUSPID VALVE/RVSP:          Normal Ranges: Peak TR Velocity:     2.22 m/s RV Syst Pressure:     23 mmHg  (< 30mmHg)  [id number] Nicholas Antonio MD Electronically signed on 12/27/2024 at 4:49:23 PM  ** Final **     ECG 12 lead    Result Date: 12/26/2024  Normal sinus rhythm Left axis deviation Anterolateral infarct (cited on or before 26-JUN-2024) Abnormal ECG When compared with ECG of 26-JUN-2024 07:13, No significant change was found See ED provider note for full interpretation and clinical correlation Confirmed by Rose Viera ([id number]) on 12/26/2024 10:31:18 AM    XR chest 1 view    Result Date: 12/26/2024  STUDY: Chest Radiograph;  12/26/2024 8:52 AM INDICATION: Shortness of breath.  Syncope. COMPARISON: Chest, single portable view obtained on 05/19/2024 at 09:50 hours.  XR Chest 04/27/2024. ACCESSION NUMBER(S): [id number] ORDERING CLINICIAN: BRENDON WEN TECHNIQUE:  Frontal chest was obtained at 08:51 hours. FINDINGS: CARDIOMEDIASTINAL SILHOUETTE: Heart is enlarged with increased pulmonary vascularity.  LUNGS: Right-sided pleural effusion slightly increased in size compared to 5/14/2024 with associated right basilar atelectasis and/or consolidation.  ABDOMEN: No remarkable upper abdominal findings.  BONES: No acute osseous changes.    Right-sided pleural effusion slightly increased in  size compared to 5/14/2024 with associated right basilar atelectasis and/or consolidation. Signed by Zane Culver MD    XR knee left 4+ views    Result Date: 12/20/2024  Interpreted By:  Lam Cheatham, STUDY: XR KNEE LEFT 4+ VIEWS   INDICATION: Signs/Symptoms:fall, left knee pain.   COMPARISON: February 2, 2024   ACCESSION NUMBER(S): [id number]   ORDERING CLINICIAN: FADY CHAPPELL   FINDINGS: Fairly advanced osteoarthritis of the left knee.   Moderate-sized joint effusion. No evidence of acute fracture.       Moderate arthrosis of the left knee with a joint effusion. No acute osseous abnormality.   Signed by: Lam Cheatham 12/20/2024 11:40 AM Dictation workstation:   [id number]    XR chest 2 views    Result Date: 12/16/2024  Interpreted By:  Lam Cheatham, STUDY: XR CHEST 2 VIEWS   INDICATION: Signs/Symptoms:PA/Lat, SOB, pleural effusion.   COMPARISON: May 18   ACCESSION NUMBER(S): [id number]   ORDERING CLINICIAN: ELDER ARANDA   FINDINGS: Increasing right pleural effusion and basilar airspace disease. This could be pneumonia with parapneumonic effusion or some asymmetric edema.   No pneumothorax. Cardiomegaly unchanged.       Increasing right pleural effusion and basilar airspace disease. This could be pneumonia with parapneumonic effusion or some asymmetric edema.   Signed by: Lam Cheatham 12/16/2024 7:04 AM Dictation workstation:   [id number]     Assessment:  69-year-old woman with a history of hypertension, coronary artery disease, congestive heart failure, peripheral vascular disease, diabetes, atrial fibrillation, COPD, chronic hypoxia-reportedly on home oxygen, hypothyroidism, left CVA, chronic renal failure-on dialysis, anemia, depression, right pleural effusion, DJD, renal cell cancer-status post partial nephrectomy, TIA, vasculitis, left atrial thrombus and obstructive sleep apnea, admitted after being found down on the floor at home for several hours, and found to have positive influenza A  infection, hyponatremia, a markedly elevated BNP and CPK, elevated TSH, and an abnormal chest x-ray showing cardiomegaly with a right pleural effusion, pulmonary edema and right lower lobe compressive atelectasis:  The patient has possible/probable rhabdomyolysis, Influenza A infection, a mild COPD exacerbation, and congestive heart failure/pulmonary edema/fluid overload, leading to acute-on-chronic hypoxic and hypercapnic respiratory failure.  The significance of her right pleural effusion is uncertain: Congestive heart failure/pulmonary edema; pneumonia with a right parapneumonic effusion; or occult malignancy cannot be excluded.  There is no bronchospasm presently.    Recommend:  1.  Continue DuoNeb, Solu-Medrol, subcutaneous heparin and Synthroid.  2.  Change patient from BiPAP to nasal cannula oxygen to keep oxygen saturation approximately 92 to 95%; continue nocturnal BiPAP from 10 PM to 7 AM; home oxygen evaluation prior to discharge.  3.  Incentive spirometry and Acapella treatment.  4.  Consider dialysis for fluid removal or diuresis with Lasix if the patient can make urine (per Renal recommendation).  5.  Ultrasound-guided right thoracentesis tomorrow or Friday, with right pleural fluid for LDH, total protein, cell count and differential, culture for bacteria and Cytology, with a simultaneous serum LDH and total protein.  6.  Follow-up with pulmonary/sleep medicine after discharge.    Lewis Yeung MD

## 2025-01-01 NOTE — CONSULTS
Inpatient consult to Nephrology  Consult performed by: Kalpesh Urena DO  Consult ordered by: Ghazal Gomez MD        Reason For Consult  ESRD on HD     History Of Present Illness  Daphne Morris is a 69 y.o. female with a past medical history of ESRD on hemodialysis via right arm aVF, hypertension, coronary artery disease, COPD on 3 LPM's nightly, hypothyroidism, CVA with right sided residual weakness, atrial fibrillation, HFpEF, moderate pericardial effusion who was recently discharged from [facility] for syncope with collapse on 12/28. At discharge, she was on 5 L nasal cannula.  Reportedly she could not get her oxygen to work at home thus she presented to the ED the following day with shortness of breath.  She comes in now as a transfer from [facility] after being found down.  She was noted to be hypercapnic by ABG with a pH of 7.16, pCO2 of 76.  She was influenza A positive.  Nephrology is consulted for ESRD care.     Past Medical History:   Diagnosis Date    Anal fissure 10/12/2023    Anemia     ASHD (arteriosclerotic heart disease)     At risk for falls     Atrial fibrillation (Multi)     CHF (congestive heart failure)     CKD (chronic kidney disease)     COPD (chronic obstructive pulmonary disease) (Multi)     CVA (cerebral vascular accident) (Multi)     2021- right-sided weakness    Depression     Diabetes mellitus (Multi)     GERD (gastroesophageal reflux disease)     H/O pleural effusion     Hyperlipidemia     Hypertension     Hypothyroidism     Hypoxia     Impacted cerumen, left ear     Impacted cerumen of left ear    Noncompliance     Osteoarthritis     Perianal dermatitis 10/12/2023    Personal history of other diseases of the respiratory system     History of acute bronchitis    PVD (peripheral vascular disease) (CMS-HCC)     Renal carcinoma, right (Multi)     s/p partial nephrectomy 8/2021    Respiratory failure (Multi)     TIA (transient ischemic attack)     Vasculitis (CMS-HCC) 10/12/2023    Weakness         Past Surgical History:   Procedure Laterality Date    ANKLE SURGERY          CARDIAC CATHETERIZATION N/A 2024    Procedure: Right Heart Cath;  Surgeon: Nicholas Antonio MD;  Location: [facility] Cardiac Cath Lab;  Service: Cardiovascular;  Laterality: N/A;    CATARACT EXTRACTION Right     2019     SECTION, CLASSIC      MR CHEST ANGIO W AND WO IV CONTRAST  2023    MR CHEST ANGIO W AND WO IV CONTRAST 2023 [facility] MRI    MR HEAD ANGIO WO IV CONTRAST  2021    MR HEAD ANGIO WO IV CONTRAST [facility]    NEPHRECTOMY  2021    SHOULDER SURGERY             Social History     Tobacco Use    Smoking status: Every Day     Current packs/day: 0.50     Average packs/day: 0.5 packs/day for 56.0 years (28.0 ttl pk-yrs)     Types: Cigarettes     Start date: 1969     Passive exposure: Never    Smokeless tobacco: Never   Vaping Use    Vaping status: Never Used   Substance Use Topics    Alcohol use: Not Currently    Drug use: Not Currently     Types: Marijuana        Family History  Family History   Problem Relation Name Age of Onset    Hypertension Mother      Diabetes Father      Heart disease Father      Cancer Sister      Cancer Brother      Diabetes Daughter      Asthma Daughter      Heart attack Daughter      Diabetes Maternal Grandfather      Heart disease Paternal Grandmother      Diabetes Other      Hypertension Other      COPD Other      Other (chronic lung disease) Other          Allergies  Bee venom protein (honey bee); Citalopram; Codeine; Influenza virus vaccines; Latex; Latex, natural rubber; Lisinopril; Penicillins; Adhesive tape-silicones; Amoxicillin; Doxycycline; Oseltamivir; Phenyleph-min oil-petrolatum; Phenyleph-shark oil-glyc-pet; Phenylephrine; Prednisone; Tuberculin ppd; and Xylometazoline    Review of Systems   10 point review of systems obtained and negative unless stated in HPI    Physical Exam   Constitutional:       Appearance: Elderly  appearing female.  On 4 L/min nasal cannula.  In no acute distress  HENT:      Head: Normocephalic and atraumatic.      Mouth: Mucous membranes are moist.   Eyes:      Extraocular Movements: Extraocular movements intact.      Conjunctiva/sclera: Conjunctivae normal.      Pupils: Pupils are equal, round, and reactive to light.   Cardiovascular:      Rate and Rhythm: Normal rate and regular rhythm.      Pulses: Normal pulses.      Heart sounds: Normal heart sounds.   Pulmonary:      Effort: Pulmonary effort is normal.      Breath sounds: Normal breath sounds.      Comments: Mildly coarse breath sounds on the right.  Abdominal:      General: Abdomen is flat. Bowel sounds are normal.      Palpations: Abdomen is soft.      Comments: Non-tender, non-distended; BS present   Musculoskeletal:         General: Normal range of motion.      Comments: Fistula RUE, positive thrill and bruit     Trace bilateral leg edema  Skin:     General: Skin is warm and dry.   Neurological:      Mental Status: Cranial nerves II through XII grossly intact.  No asterixis.  Psychiatric:      Comments: Normal mood and behavior.       I&O 24HR  No intake or output data in the 24 hours ending 01/01/25 1051    Vitals 24HR  Heart Rate:  [71-77]   Temp:  [36.3 °C (97.3 °F)-37.2 °C (98.9 °F)]   Resp:  [14-24]   BP: (105-147)/(51-81)   SpO2:  [93 %-100 %]     Scheduled Medications  amiodarone, 200 mg, oral, Daily with breakfast  amLODIPine, 5 mg, oral, Daily  aspirin, 81 mg, oral, Daily  atorvastatin, 40 mg, oral, Daily  clopidogrel, 75 mg, oral, Daily  ferrous gluconate, 324 mg, oral, Daily with breakfast  heparin (porcine), 5,000 Units, subcutaneous, q8h SUMIT  insulin glargine, 10 Units, subcutaneous, Nightly  insulin lispro, 0-10 Units, subcutaneous, q4h  ipratropium-albuteroL, 3 mL, nebulization, TID  levothyroxine, 250 mcg, oral, Daily before breakfast  methylPREDNISolone sodium succinate (PF), 40 mg, intravenous, q8h  metoprolol succinate XL, 50 mg,  oral, BID  oxygen, , inhalation, Continuous - Inhalation  pantoprazole, 40 mg, oral, BID  venlafaxine XR, 150 mg, oral, Daily      Continuous medications       PRN medications: acetaminophen **OR** acetaminophen **OR** acetaminophen, dextrose, dextrose, glucagon, glucagon, ipratropium-albuteroL, ondansetron ODT **OR** ondansetron     Relevant Results  Results from last 7 days   Lab Units 01/01/25  0058 12/31/24  1730 12/31/24  0822   WBC AUTO x10*3/uL 5.6 6.3 7.1   HEMOGLOBIN g/dL 10.8* 11.2* 12.0   HEMATOCRIT % 33.9* 34.9* 38.2   PLATELETS AUTO x10*3/uL 185 202 201   NEUTROS PCT AUTO % 80.2 79.4 78.9   LYMPHS PCT AUTO % 8.0 7.0 6.8   MONOS PCT AUTO % 10.5 11.4 8.9   EOS PCT AUTO % 0.0 0.9 4.3      Results from last 7 days   Lab Units 01/01/25  0620 01/01/25 0058 12/31/24 1730 12/31/24  0822   SODIUM mmol/L 127* 129* 129* 129*   POTASSIUM mmol/L 4.7 4.9 4.8 5.2   CHLORIDE mmol/L 93* 95* 91* 93*   CO2 mmol/L 24 22 27 23   BUN mg/dL 62* 64* 62* 55*   CREATININE mg/dL 4.57* 4.53* 4.52* 4.12*   CALCIUM mg/dL 6.4* 6.3* 7.1* 7.5*   PROTEIN TOTAL g/dL  --  5.5* 6.3* 6.5   BILIRUBIN TOTAL mg/dL  --  0.4 0.4 0.5   ALK PHOS U/L  --  83 97 91   ALT U/L  --  13 13 12   AST U/L  --  36 41* 43*   GLUCOSE mg/dL 139* 118* 47* 81      XR chest 1 view   Final Result   1. Cardiomegaly. Mild vascular congestion. Moderate right pleural   effusion with atelectasis/consolidation at the right lung base.                  MACRO:   None.        Signed by: Beulah Chacon 1/1/2025 3:48 AM   Dictation workstation:   [id number]            Assessment/Plan   Daphne Morris is a 69 y.o. female with a past medical history of ESRD on hemodialysis via right arm aVF, hypertension, coronary artery disease, COPD on 3 LPM's nightly, hypothyroidism, CVA with right sided residual weakness, atrial fibrillation, HFpEF, moderate pericardial effusion who was recently discharged from [facility] for syncope with collapse on 12/28. At discharge, she was on 5 L  nasal cannula.  Reportedly she could not get her oxygen to work at home thus she presented to the ED the following day with shortness of breath.  She comes in now as a transfer from [facility] after being found down.  She was noted to be hypercapnic by ABG with a pH of 7.16, pCO2 of 76.  She was influenza A positive.  Nephrology is consulted for ESRD care.    Ms. Morris's volume status is acceptable with stable electrolytes.  No acute indication for RRT.  I will place orders for dialysis tomorrow.  I will place orders for 1.4 L fluid restriction given her hyponatremia.  Nephrology will follow with her care.      Assessment & Plan  Influenza      I spent 50 minutes in the professional and overall care of this patient.      Kalpesh Urena, DO

## 2025-01-01 NOTE — ASSESSMENT & PLAN NOTE
- pt with allergy to Tamiflu, therefore did not start  - supportive therapy    Hypercapnic respiratory failure  - has a hx of COPD, with baseline oxygen requirement of 3 LPM, however, recently had been requiring 5 LPM.   - found down at home for uncertain amt of time, possibly 16+ hrs off oxygen  - PaCO2 was improving at [facility], however, upon arrival to SDU CO2 has increased.   - Continue BiPAP, will repeat ABG in a few hrs to check for improvement  - solumedrol for COPD exacerbation  - albuterol MDI, have not ordered duonebs due to Influenza.     Atrial fibrillation  - telemetry  - Amiodarone  - continue metoprolol for rate control  - does not appear to be on AC    Hypothyroidism  - continue synthroid, check TSH    PVD  - Plavix    DM  - continue Lantus, ISS  - currently pt is hypoglycemic; hypoglycemia protocol  - will start D10 if needed, however, need to limit volume due to HD    ESRD on HD  - T, Th, Sat  - nephrology consult

## 2025-01-01 NOTE — H&P
History Of Present Illness  Daphne Morris is a 69 y.o. female with a PMH of H TN, CAD, PVD, DM, atrial fibrillation, COPD (3 LPM oxygen at baseline), hypothyroidism, CVA with R sided weakness presenting from [facility] with hypercapnic respiratory failure.    Ms Morris is sleepy, unable to provide history. Apparently was found down at home, possibly down for 16 hours. She was recently in and out of the ED at [facility]. Was there 12/28 after a syncopal episode at dialysis. She was discharged home on 5 LPM oxygen rather than her usual 3 LPM. However, she could not get her oxygen tank to work at home, was without oxygen until she returned to the ED the following day. She was discharged home again, with somebody going to her home to replace her oxygen tank.     According the transfer records she was to be  transferred to [facility] for a higher level of care. She was to be admitted to the SDU if her blood gas improved, with the decision of SDU vs ICU to be made prior to her being sent to [facility]. However, pt arrived in the SDU, with no updated report given.   ABG on arrival to the SDU showed a pH of 7.16, PaCO2 again elevated at 76.      Past Medical History  Past Medical History:   Diagnosis Date    Anal fissure 10/12/2023    Anemia     ASHD (arteriosclerotic heart disease)     At risk for falls     Atrial fibrillation (Multi)     CHF (congestive heart failure)     CKD (chronic kidney disease)     COPD (chronic obstructive pulmonary disease) (Multi)     CVA (cerebral vascular accident) (Multi)     2021- right-sided weakness    Depression     Diabetes mellitus (Multi)     GERD (gastroesophageal reflux disease)     H/O pleural effusion     Hyperlipidemia     Hypertension     Hypothyroidism     Hypoxia     Impacted cerumen, left ear     Impacted cerumen of left ear    Noncompliance     Osteoarthritis     Perianal dermatitis 10/12/2023    Personal history of other diseases of the respiratory system     History of acute bronchitis     PVD (peripheral vascular disease) (CMS-HCC)     Renal carcinoma, right (Multi)     s/p partial nephrectomy 2021    Respiratory failure (Multi)     TIA (transient ischemic attack)     Vasculitis (CMS-HCC) 10/12/2023    Weakness        Surgical History  Past Surgical History:   Procedure Laterality Date    ANKLE SURGERY          CARDIAC CATHETERIZATION N/A 2024    Procedure: Right Heart Cath;  Surgeon: Nicholas Antonio MD;  Location: [facility] Cardiac Cath Lab;  Service: Cardiovascular;  Laterality: N/A;    CATARACT EXTRACTION Right     2019     SECTION, CLASSIC      MR CHEST ANGIO W AND WO IV CONTRAST  2023    MR CHEST ANGIO W AND WO IV CONTRAST 2023 [facility] MRI    MR HEAD ANGIO WO IV CONTRAST  2021    MR HEAD ANGIO WO IV CONTRAST [facility]    NEPHRECTOMY  2021    SHOULDER SURGERY              Social History  She reports that she has been smoking cigarettes. She started smoking about 56 years ago. She has a 28 pack-year smoking history. She has never been exposed to tobacco smoke. She has never used smokeless tobacco. She reports that she does not currently use alcohol. She reports that she does not currently use drugs after having used the following drugs: Marijuana.    Family History  Family History   Problem Relation Name Age of Onset    Hypertension Mother      Diabetes Father      Heart disease Father      Cancer Sister      Cancer Brother      Diabetes Daughter      Asthma Daughter      Heart attack Daughter      Diabetes Maternal Grandfather      Heart disease Paternal Grandmother      Diabetes Other      Hypertension Other      COPD Other      Other (chronic lung disease) Other          Allergies  Bee venom protein (honey bee); Citalopram; Codeine; Influenza virus vaccines; Latex; Latex, natural rubber; Lisinopril; Penicillins; Adhesive tape-silicones; Amoxicillin; Doxycycline; Oseltamivir; Phenyleph-min oil-petrolatum; Phenyleph-shark oil-glyc-pet;  Phenylephrine; Prednisone; Tuberculin ppd; and Xylometazoline    Review of Systems   Unable to perform ROS: Acuity of condition (pt lethargic)        Physical Exam  Vitals reviewed.   Constitutional:       Appearance: Normal appearance.      Comments: Elderly female, lethargic. Wakes up with arterial puncture from the ABG.    HENT:      Head: Normocephalic and atraumatic.      Comments: BiPAP on     Mouth/Throat:      Mouth: Mucous membranes are moist.   Eyes:      Extraocular Movements: Extraocular movements intact.      Conjunctiva/sclera: Conjunctivae normal.      Pupils: Pupils are equal, round, and reactive to light.   Cardiovascular:      Rate and Rhythm: Normal rate and regular rhythm.      Pulses: Normal pulses.      Heart sounds: Normal heart sounds.   Pulmonary:      Effort: Pulmonary effort is normal.      Breath sounds: Normal breath sounds.      Comments: Coarse breath sounds on the right  Abdominal:      General: Abdomen is flat. Bowel sounds are normal.      Palpations: Abdomen is soft.      Comments: Non-tender, non-distended; BS present   Musculoskeletal:         General: Normal range of motion.      Comments: Fistula RUE, with weak thrill   Skin:     General: Skin is warm and dry.      Comments: Skin color changes bilateral calves, lack of hair likely changes secondary to PVD   Neurological:      Mental Status: She is alert.      Comments: Did not perform neuro exam, pt sleeping   Psychiatric:      Comments: Could not assess.           Last Recorded Vitals  Blood pressure 113/71, temperature 36.3 °C (97.3 °F), resp. rate 17, SpO2 96%.    Relevant Results        Results for orders placed or performed during the hospital encounter of 12/31/24 (from the past 24 hours)   Blood Gas Arterial Full Panel   Result Value Ref Range    POCT pH, Arterial 7.16 (LL) 7.38 - 7.42 pH    POCT pCO2, Arterial 76 (HH) 38 - 42 mm Hg    POCT pO2, Arterial 79 (L) 85 - 95 mm Hg    POCT SO2, Arterial 97 94 - 100 %    POCT  Oxy Hemoglobin, Arterial 94.5 94.0 - 98.0 %    POCT Hematocrit Calculated, Arterial 33.0 (L) 36.0 - 46.0 %    POCT Sodium, Arterial 126 (L) 136 - 145 mmol/L    POCT Potassium, Arterial 4.8 3.5 - 5.3 mmol/L    POCT Chloride, Arterial 93 (L) 98 - 107 mmol/L    POCT Ionized Calcium, Arterial 0.94 (L) 1.10 - 1.33 mmol/L    POCT Glucose, Arterial 177 (H) 74 - 99 mg/dL    POCT Lactate, Arterial 0.4 0.4 - 2.0 mmol/L    POCT Base Excess, Arterial -2.8 (L) -2.0 - 3.0 mmol/L    POCT HCO3 Calculated, Arterial 27.1 (H) 22.0 - 26.0 mmol/L    POCT Hemoglobin, Arterial 11.0 (L) 12.0 - 16.0 g/dL    POCT Anion Gap, Arterial 11 10 - 25 mmo/L    Patient Temperature 37.0 degrees Celsius    FiO2 45 %    Site of Arterial Puncture Radial Left     Jonathan's Test Positive    POCT GLUCOSE   Result Value Ref Range    POCT Glucose 146 (H) 74 - 99 mg/dL   Comprehensive Metabolic Panel   Result Value Ref Range    Glucose 118 (H) 74 - 99 mg/dL    Sodium 129 (L) 136 - 145 mmol/L    Potassium 4.9 3.5 - 5.3 mmol/L    Chloride 95 (L) 98 - 107 mmol/L    Bicarbonate 22 21 - 32 mmol/L    Anion Gap 17 10 - 20 mmol/L    Urea Nitrogen 64 (H) 6 - 23 mg/dL    Creatinine 4.53 (H) 0.50 - 1.05 mg/dL    eGFR 10 (L) >60 mL/min/1.73m*2    Calcium 6.3 (L) 8.6 - 10.3 mg/dL    Albumin 3.0 (L) 3.4 - 5.0 g/dL    Alkaline Phosphatase 83 33 - 136 U/L    Total Protein 5.5 (L) 6.4 - 8.2 g/dL    AST 36 9 - 39 U/L    Bilirubin, Total 0.4 0.0 - 1.2 mg/dL    ALT 13 7 - 45 U/L   Magnesium   Result Value Ref Range    Magnesium 2.21 1.60 - 2.40 mg/dL   CBC and Auto Differential   Result Value Ref Range    WBC 5.6 4.4 - 11.3 x10*3/uL    nRBC 0.0 0.0 - 0.0 /100 WBCs    RBC 3.42 (L) 4.00 - 5.20 x10*6/uL    Hemoglobin 10.8 (L) 12.0 - 16.0 g/dL    Hematocrit 33.9 (L) 36.0 - 46.0 %    MCV 99 80 - 100 fL    MCH 31.6 26.0 - 34.0 pg    MCHC 31.9 (L) 32.0 - 36.0 g/dL    RDW 13.8 11.5 - 14.5 %    Platelets 185 150 - 450 x10*3/uL    Neutrophils % 80.2 40.0 - 80.0 %    Immature Granulocytes %,  Automated 1.1 (H) 0.0 - 0.9 %    Lymphocytes % 8.0 13.0 - 44.0 %    Monocytes % 10.5 2.0 - 10.0 %    Eosinophils % 0.0 0.0 - 6.0 %    Basophils % 0.2 0.0 - 2.0 %    Neutrophils Absolute 4.53 1.20 - 7.70 x10*3/uL    Immature Granulocytes Absolute, Automated 0.06 0.00 - 0.70 x10*3/uL    Lymphocytes Absolute 0.45 (L) 1.20 - 4.80 x10*3/uL    Monocytes Absolute 0.59 0.10 - 1.00 x10*3/uL    Eosinophils Absolute 0.00 0.00 - 0.70 x10*3/uL    Basophils Absolute 0.01 0.00 - 0.10 x10*3/uL   Creatine Kinase   Result Value Ref Range    Creatine Kinase 722 (H) 0 - 215 U/L     *Note: Due to a large number of results and/or encounters for the requested time period, some results have not been displayed. A complete set of results can be found in Results Review.          Assessment/Plan   Assessment & Plan  Influenza  - pt with allergy to Tamiflu, therefore did not start  - supportive therapy    Hypercapnic respiratory failure  - has a hx of COPD, with baseline oxygen requirement of 3 LPM, however, recently had been requiring 5 LPM.   - found down at home for uncertain amt of time, possibly 16+ hrs off oxygen  - PaCO2 was improving at [facility], however, upon arrival to SDU CO2 has increased.   - Continue BiPAP, will repeat ABG in a few hrs to check for improvement  - solumedrol for COPD exacerbation  - albuterol MDI, have not ordered duonebs due to Influenza.     Atrial fibrillation  - telemetry  - Amiodarone  - continue metoprolol for rate control  - does not appear to be on AC    Hypothyroidism  - continue synthroid, check TSH    PVD  - Plavix    DM  - continue Lantus, ISS  - currently pt is hypoglycemic; hypoglycemia protocol  - will start D10 if needed, however, need to limit volume due to HD    ESRD on HD  - T, Th, Sat  - nephrology consult             I spent 60 minutes in the professional and overall care of this patient.      Ghazal Gomez MD

## 2025-01-01 NOTE — ED PROVIDER NOTES
Chief Complaint: Fall   HPI: this is a 69-year-old female, presenting to the emergency department by EMS for evaluation after a fall.  Patient has a history of end-stage renal disease, and is on dialysis, last had dialysis 3 days ago and is due for dialysis today.  Patient does have a history of falling, and was seen in the emergency department yesterday for a fall.  Fall was unwitnessed, and there is concern that the patient has been down on the ground for about 16 hours.  Patient does have some confusion, and is altered, as such history is somewhat limited.    Past Medical History:   Diagnosis Date    Anal fissure 10/12/2023    Anemia     ASHD (arteriosclerotic heart disease)     At risk for falls     Atrial fibrillation (Multi)     CHF (congestive heart failure)     CKD (chronic kidney disease)     COPD (chronic obstructive pulmonary disease) (Multi)     CVA (cerebral vascular accident) (Multi)     - right-sided weakness    Depression     Diabetes mellitus (Multi)     GERD (gastroesophageal reflux disease)     H/O pleural effusion     Hyperlipidemia     Hypertension     Hypothyroidism     Hypoxia     Impacted cerumen, left ear     Impacted cerumen of left ear    Noncompliance     Osteoarthritis     Perianal dermatitis 10/12/2023    Personal history of other diseases of the respiratory system     History of acute bronchitis    PVD (peripheral vascular disease) (CMS-HCC)     Renal carcinoma, right (Multi)     s/p partial nephrectomy 2021    Respiratory failure (Multi)     TIA (transient ischemic attack)     Vasculitis (CMS-HCC) 10/12/2023    Weakness       Past Surgical History:   Procedure Laterality Date    ANKLE SURGERY          CARDIAC CATHETERIZATION N/A 2024    Procedure: Right Heart Cath;  Surgeon: Nicholas Antonio MD;  [facility];  Service: Cardiovascular;  Laterality: N/A;    CATARACT EXTRACTION Right     2019     SECTION, CLASSIC      MR CHEST ANGIO W AND  WO IV CONTRAST  08/22/2023    MR CHEST ANGIO W AND WO IV [facility]    MR HEAD ANGIO WO IV CONTRAST  04/06/2021    MR HEAD ANGIO WO IV CONTRAST [facility]    NEPHRECTOMY  08/04/2021    SHOULDER SURGERY      2009       Physical Exam  Vitals and nursing note reviewed.   Constitutional:       Appearance: Normal appearance.   HENT:      Head: Normocephalic and atraumatic.      Mouth/Throat:      Mouth: Mucous membranes are moist.   Eyes:      Conjunctiva/sclera: Conjunctivae normal.   Cardiovascular:      Rate and Rhythm: Normal rate.   Pulmonary:      Effort: Pulmonary effort is normal.   Abdominal:      General: Abdomen is flat.      Palpations: Abdomen is soft.   Musculoskeletal:         General: Normal range of motion.      Cervical back: Normal range of motion.   Skin:     General: Skin is warm and dry.      Comments: Large superficial skin tear to the right forearm   Neurological:      General: No focal deficit present.      Mental Status: She is alert.   Psychiatric:         Mood and Affect: Mood normal.       ED Course/MDM  ED Course as of 01/01/25 1846   Tue Dec 31, 2024   1005 Dr. Rojas, hospitalist accepted the patient in transfer  [JH]      ED Course User Index  [JH] Deanna Stearns,          Diagnoses as of 01/01/25 1846   Fall, initial encounter   Influenza A   Respiratory acidosis   Dialysis patient (CMS-[facility])     This is a 69 y.o. female presenting to the ED this is a 69-year-old female, brought to the emergency department by EMS for evaluation after being found on the floor.  Unclear how long she had been down on the floor however concerned that it has been upwards of 16 hours.  Patient was seen yesterday for frequent falls as well in the emergency department.  She is a dialysis patient and had dialysis last 3 days ago and is due for dialysis again today.  Further history is limited as patient has altered mental status.  On physical exam, the patient is confused,  however opens eyes to voice, and answer some questions.  She is moving all extremities.  She has a large skin tear to the right forearm that is superficial.  No other external signs of trauma.  As it is unclear mechanism, CT scan of the head and C-spine were obtained and were nonconcerning for any acute traumatic injury.  X-ray of the right forearm was also obtained, and does not show any evidence of acute injury.  Wound was cleansed, skin was approximated and covered, no sutures applied.  Lab work was concerning for a significant respiratory acidosis and CO2 retention, as such the patient was placed on BiPAP.  Respiratory acidosis improved however did not resolve.  Patient also tested positive for influenza A, as well as had an elevated BNP.  She was noted to be hyperglycemic throughout her stay, and was given an amp of D50 with correction of her hypoglycemia.  There are no ICU beds at this facility and we are unable to accommodate dialysis, as such I reach out to the hospitalist at [facility] who ultimately accepted the patient to stepdown.  She was transferred in sick but stable condition    Critical care time: 60    Final Impression  1.  Hypercapnic respiratory failure  2.  Influenza A  3.  Dialysis patient  4.  Fall  Disposition/Plan: Transfer  Condition at disposition: Stable.     Deanna Stearns DO  Emergency Medicine Physician    Patient ID: Daphne Morris is a 69 y.o. female.    Critical Care    Performed by: Deanna Stearns DO  Authorized by: Deanna Stearns DO    Critical care provider statement:     Critical care time (minutes):  60    Critical care was necessary to treat or prevent imminent or life-threatening deterioration of the following conditions:  Respiratory failure and metabolic crisis    Critical care was time spent personally by me on the following activities:  Evaluation of patient's response to treatment, examination of patient, ordering and review of laboratory studies, ordering  and review of radiographic studies, pulse oximetry and re-evaluation of patient's condition    Care discussed with: admitting provider         Deanna Stearns DO  01/01/25 2974

## 2025-01-02 ENCOUNTER — APPOINTMENT (OUTPATIENT)
Dept: RADIOLOGY | Facility: HOSPITAL | Age: 70
End: 2025-01-02
Payer: MEDICARE

## 2025-01-02 ENCOUNTER — APPOINTMENT (OUTPATIENT)
Dept: CARDIOLOGY | Facility: HOSPITAL | Age: 70
End: 2025-01-02
Payer: MEDICARE

## 2025-01-02 ENCOUNTER — APPOINTMENT (OUTPATIENT)
Dept: DIALYSIS | Facility: HOSPITAL | Age: 70
End: 2025-01-02
Payer: MEDICARE

## 2025-01-02 LAB
ALBUMIN SERPL BCP-MCNC: 3.1 G/DL (ref 3.4–5)
ALP SERPL-CCNC: 78 U/L (ref 33–136)
ALT SERPL W P-5'-P-CCNC: 11 U/L (ref 7–45)
ANION GAP BLDA CALCULATED.4IONS-SCNC: 11 MMO/L (ref 10–25)
ANION GAP SERPL CALC-SCNC: 17 MMOL/L (ref 10–20)
AST SERPL W P-5'-P-CCNC: 29 U/L (ref 9–39)
ATRIAL RATE: 72 BPM
BASE EXCESS BLDA CALC-SCNC: 0.9 MMOL/L (ref -2–3)
BILIRUB SERPL-MCNC: 0.4 MG/DL (ref 0–1.2)
BODY TEMPERATURE: 37 DEGREES CELSIUS
BUN SERPL-MCNC: 67 MG/DL (ref 6–23)
CA-I BLDA-SCNC: 1.07 MMOL/L (ref 1.1–1.33)
CALCIUM SERPL-MCNC: 6.8 MG/DL (ref 8.6–10.3)
CHLORIDE BLDA-SCNC: 93 MMOL/L (ref 98–107)
CHLORIDE SERPL-SCNC: 89 MMOL/L (ref 98–107)
CO2 SERPL-SCNC: 25 MMOL/L (ref 21–32)
CREAT SERPL-MCNC: 4.78 MG/DL (ref 0.5–1.05)
EGFRCR SERPLBLD CKD-EPI 2021: 9 ML/MIN/1.73M*2
ERYTHROCYTE [DISTWIDTH] IN BLOOD BY AUTOMATED COUNT: 13.4 % (ref 11.5–14.5)
GLUCOSE BLD MANUAL STRIP-MCNC: 112 MG/DL (ref 74–99)
GLUCOSE BLD MANUAL STRIP-MCNC: 113 MG/DL (ref 74–99)
GLUCOSE BLD MANUAL STRIP-MCNC: 158 MG/DL (ref 74–99)
GLUCOSE BLD MANUAL STRIP-MCNC: 175 MG/DL (ref 74–99)
GLUCOSE BLD MANUAL STRIP-MCNC: 57 MG/DL (ref 74–99)
GLUCOSE BLD MANUAL STRIP-MCNC: 62 MG/DL (ref 74–99)
GLUCOSE BLDA-MCNC: 150 MG/DL (ref 74–99)
GLUCOSE SERPL-MCNC: 149 MG/DL (ref 74–99)
HCO3 BLDA-SCNC: 29.6 MMOL/L (ref 22–26)
HCT VFR BLD AUTO: 32.6 % (ref 36–46)
HCT VFR BLD EST: 33 % (ref 36–46)
HGB BLD-MCNC: 10.5 G/DL (ref 12–16)
HGB BLDA-MCNC: 11 G/DL (ref 12–16)
INHALED O2 CONCENTRATION: 36 %
LACTATE BLDA-SCNC: 0.4 MMOL/L (ref 0.4–2)
LDH SERPL L TO P-CCNC: 305 U/L (ref 84–246)
MCH RBC QN AUTO: 31.3 PG (ref 26–34)
MCHC RBC AUTO-ENTMCNC: 32.2 G/DL (ref 32–36)
MCV RBC AUTO: 97 FL (ref 80–100)
NRBC BLD-RTO: 0 /100 WBCS (ref 0–0)
OXYHGB MFR BLDA: 93.4 % (ref 94–98)
P AXIS: 74 DEGREES
P OFFSET: 150 MS
P OFFSET: 156 MS
P ONSET: 107 MS
P ONSET: 136 MS
PCO2 BLDA: 69 MM HG (ref 38–42)
PH BLDA: 7.24 PH (ref 7.38–7.42)
PLATELET # BLD AUTO: 195 X10*3/UL (ref 150–450)
PO2 BLDA: 72 MM HG (ref 85–95)
POTASSIUM BLDA-SCNC: 4.5 MMOL/L (ref 3.5–5.3)
POTASSIUM SERPL-SCNC: 5.7 MMOL/L (ref 3.5–5.3)
PR INTERVAL: 210 MS
PROT SERPL-MCNC: 5.6 G/DL (ref 6.4–8.2)
PROT SERPL-MCNC: 5.6 G/DL (ref 6.4–8.2)
Q ONSET: 212 MS
Q ONSET: 217 MS
QRS COUNT: 12 BEATS
QRS COUNT: 12 BEATS
QRS DURATION: 122 MS
QRS DURATION: 136 MS
QT INTERVAL: 436 MS
QT INTERVAL: 464 MS
QTC CALCULATION(BAZETT): 486 MS
QTC CALCULATION(BAZETT): 508 MS
QTC FREDERICIA: 469 MS
QTC FREDERICIA: 493 MS
R AXIS: -50 DEGREES
R AXIS: -56 DEGREES
RBC # BLD AUTO: 3.35 X10*6/UL (ref 4–5.2)
SAO2 % BLDA: 96 % (ref 94–100)
SODIUM BLDA-SCNC: 129 MMOL/L (ref 136–145)
SODIUM SERPL-SCNC: 125 MMOL/L (ref 136–145)
SPECIMEN DRAWN FROM PATIENT: ABNORMAL
T AXIS: 116 DEGREES
T AXIS: 95 DEGREES
T OFFSET: 435 MS
T OFFSET: 444 MS
VENTRICULAR RATE: 72 BPM
VENTRICULAR RATE: 75 BPM
WBC # BLD AUTO: 6.6 X10*3/UL (ref 4.4–11.3)

## 2025-01-02 PROCEDURE — 2500000004 HC RX 250 GENERAL PHARMACY W/ HCPCS (ALT 636 FOR OP/ED): Performed by: HOSPITALIST

## 2025-01-02 PROCEDURE — 82435 ASSAY OF BLOOD CHLORIDE: CPT | Performed by: INTERNAL MEDICINE

## 2025-01-02 PROCEDURE — 93005 ELECTROCARDIOGRAM TRACING: CPT

## 2025-01-02 PROCEDURE — 36600 WITHDRAWAL OF ARTERIAL BLOOD: CPT

## 2025-01-02 PROCEDURE — 2500000001 HC RX 250 WO HCPCS SELF ADMINISTERED DRUGS (ALT 637 FOR MEDICARE OP): Performed by: HOSPITALIST

## 2025-01-02 PROCEDURE — 99233 SBSQ HOSP IP/OBS HIGH 50: CPT | Performed by: INTERNAL MEDICINE

## 2025-01-02 PROCEDURE — 5A1D70Z PERFORMANCE OF URINARY FILTRATION, INTERMITTENT, LESS THAN 6 HOURS PER DAY: ICD-10-PCS | Performed by: INTERNAL MEDICINE

## 2025-01-02 PROCEDURE — 2500000002 HC RX 250 W HCPCS SELF ADMINISTERED DRUGS (ALT 637 FOR MEDICARE OP, ALT 636 FOR OP/ED): Performed by: HOSPITALIST

## 2025-01-02 PROCEDURE — 2500000005 HC RX 250 GENERAL PHARMACY W/O HCPCS: Performed by: INTERNAL MEDICINE

## 2025-01-02 PROCEDURE — 94660 CPAP INITIATION&MGMT: CPT

## 2025-01-02 PROCEDURE — 80053 COMPREHEN METABOLIC PANEL: CPT | Performed by: INTERNAL MEDICINE

## 2025-01-02 PROCEDURE — 84155 ASSAY OF PROTEIN SERUM: CPT | Performed by: INTERNAL MEDICINE

## 2025-01-02 PROCEDURE — 36415 COLL VENOUS BLD VENIPUNCTURE: CPT | Performed by: INTERNAL MEDICINE

## 2025-01-02 PROCEDURE — 8010000001 HC DIALYSIS - HEMODIALYSIS PER DAY

## 2025-01-02 PROCEDURE — 83615 LACTATE (LD) (LDH) ENZYME: CPT | Performed by: INTERNAL MEDICINE

## 2025-01-02 PROCEDURE — 82947 ASSAY GLUCOSE BLOOD QUANT: CPT

## 2025-01-02 PROCEDURE — 1200000002 HC GENERAL ROOM WITH TELEMETRY DAILY

## 2025-01-02 PROCEDURE — 94640 AIRWAY INHALATION TREATMENT: CPT

## 2025-01-02 PROCEDURE — 85027 COMPLETE CBC AUTOMATED: CPT | Performed by: INTERNAL MEDICINE

## 2025-01-02 RX ORDER — OXYCODONE HYDROCHLORIDE 5 MG/1
5 TABLET ORAL ONCE
Status: COMPLETED | OUTPATIENT
Start: 2025-01-02 | End: 2025-01-02

## 2025-01-02 RX ADMIN — METHYLPREDNISOLONE SODIUM SUCCINATE 40 MG: 40 INJECTION, POWDER, FOR SOLUTION INTRAMUSCULAR; INTRAVENOUS at 12:32

## 2025-01-02 RX ADMIN — Medication 40 PERCENT: at 19:45

## 2025-01-02 RX ADMIN — CLOPIDOGREL 75 MG: 75 TABLET ORAL at 09:14

## 2025-01-02 RX ADMIN — METOPROLOL SUCCINATE 50 MG: 50 TABLET, EXTENDED RELEASE ORAL at 09:14

## 2025-01-02 RX ADMIN — ONDANSETRON 4 MG: 2 INJECTION, SOLUTION INTRAMUSCULAR; INTRAVENOUS at 02:19

## 2025-01-02 RX ADMIN — IPRATROPIUM BROMIDE AND ALBUTEROL SULFATE 3 ML: 2.5; .5 SOLUTION RESPIRATORY (INHALATION) at 19:45

## 2025-01-02 RX ADMIN — AMLODIPINE BESYLATE 5 MG: 5 TABLET ORAL at 09:14

## 2025-01-02 RX ADMIN — ACETAMINOPHEN 650 MG: 325 TABLET, FILM COATED ORAL at 00:26

## 2025-01-02 RX ADMIN — HEPARIN SODIUM 5000 UNITS: 5000 INJECTION, SOLUTION INTRAVENOUS; SUBCUTANEOUS at 21:16

## 2025-01-02 RX ADMIN — Medication 3 L/MIN: at 21:35

## 2025-01-02 RX ADMIN — Medication 3 L/MIN: at 03:31

## 2025-01-02 RX ADMIN — ASPIRIN 81 MG: 81 TABLET, COATED ORAL at 09:14

## 2025-01-02 RX ADMIN — LEVOTHYROXINE SODIUM 250 MCG: 0.12 TABLET ORAL at 06:09

## 2025-01-02 RX ADMIN — PANTOPRAZOLE SODIUM 40 MG: 40 TABLET, DELAYED RELEASE ORAL at 09:14

## 2025-01-02 RX ADMIN — VENLAFAXINE HYDROCHLORIDE 150 MG: 75 CAPSULE, EXTENDED RELEASE ORAL at 09:14

## 2025-01-02 RX ADMIN — Medication 3 L/MIN: at 12:33

## 2025-01-02 RX ADMIN — METHYLPREDNISOLONE SODIUM SUCCINATE 40 MG: 40 INJECTION, POWDER, FOR SOLUTION INTRAMUSCULAR; INTRAVENOUS at 02:20

## 2025-01-02 RX ADMIN — METHYLPREDNISOLONE SODIUM SUCCINATE 40 MG: 40 INJECTION, POWDER, FOR SOLUTION INTRAMUSCULAR; INTRAVENOUS at 18:38

## 2025-01-02 RX ADMIN — HEPARIN SODIUM 5000 UNITS: 5000 INJECTION, SOLUTION INTRAVENOUS; SUBCUTANEOUS at 13:40

## 2025-01-02 RX ADMIN — Medication 40 PERCENT: at 23:17

## 2025-01-02 RX ADMIN — AMIODARONE HYDROCHLORIDE 200 MG: 200 TABLET ORAL at 12:37

## 2025-01-02 RX ADMIN — METOPROLOL SUCCINATE 50 MG: 50 TABLET, EXTENDED RELEASE ORAL at 21:17

## 2025-01-02 RX ADMIN — HEPARIN SODIUM 5000 UNITS: 5000 INJECTION, SOLUTION INTRAVENOUS; SUBCUTANEOUS at 06:09

## 2025-01-02 RX ADMIN — OXYCODONE HYDROCHLORIDE 5 MG: 5 TABLET ORAL at 02:45

## 2025-01-02 RX ADMIN — ATORVASTATIN CALCIUM 40 MG: 40 TABLET, FILM COATED ORAL at 09:14

## 2025-01-02 RX ADMIN — INSULIN GLARGINE 10 UNITS: 100 INJECTION, SOLUTION SUBCUTANEOUS at 21:17

## 2025-01-02 RX ADMIN — PANTOPRAZOLE SODIUM 40 MG: 40 TABLET, DELAYED RELEASE ORAL at 21:17

## 2025-01-02 SDOH — HEALTH STABILITY: PHYSICAL HEALTH: ON AVERAGE, HOW MANY MINUTES DO YOU ENGAGE IN EXERCISE AT THIS LEVEL?: 0 MIN

## 2025-01-02 SDOH — HEALTH STABILITY: PHYSICAL HEALTH: ON AVERAGE, HOW MANY DAYS PER WEEK DO YOU ENGAGE IN MODERATE TO STRENUOUS EXERCISE (LIKE A BRISK WALK)?: 0 DAYS

## 2025-01-02 SDOH — ECONOMIC STABILITY: HOUSING INSECURITY: IN THE LAST 12 MONTHS, WAS THERE A TIME WHEN YOU WERE NOT ABLE TO PAY THE MORTGAGE OR RENT ON TIME?: NO

## 2025-01-02 SDOH — ECONOMIC STABILITY: HOUSING INSECURITY: IN THE PAST 12 MONTHS, HOW MANY TIMES HAVE YOU MOVED WHERE YOU WERE LIVING?: 1

## 2025-01-02 SDOH — ECONOMIC STABILITY: HOUSING INSECURITY: AT ANY TIME IN THE PAST 12 MONTHS, WERE YOU HOMELESS OR LIVING IN A SHELTER (INCLUDING NOW)?: NO

## 2025-01-02 SDOH — ECONOMIC STABILITY: FOOD INSECURITY: HOW HARD IS IT FOR YOU TO PAY FOR THE VERY BASICS LIKE FOOD, HOUSING, MEDICAL CARE, AND HEATING?: NOT HARD AT ALL

## 2025-01-02 SDOH — ECONOMIC STABILITY: TRANSPORTATION INSECURITY: IN THE PAST 12 MONTHS, HAS LACK OF TRANSPORTATION KEPT YOU FROM MEDICAL APPOINTMENTS OR FROM GETTING MEDICATIONS?: NO

## 2025-01-02 ASSESSMENT — COGNITIVE AND FUNCTIONAL STATUS - GENERAL
MOVING TO AND FROM BED TO CHAIR: A LITTLE
DRESSING REGULAR UPPER BODY CLOTHING: A LITTLE
EATING MEALS: A LITTLE
HELP NEEDED FOR BATHING: A LITTLE
PERSONAL GROOMING: A LITTLE
DRESSING REGULAR LOWER BODY CLOTHING: A LITTLE
CLIMB 3 TO 5 STEPS WITH RAILING: A LITTLE
TURNING FROM BACK TO SIDE WHILE IN FLAT BAD: A LITTLE
MOBILITY SCORE: 18
WALKING IN HOSPITAL ROOM: A LITTLE
DAILY ACTIVITIY SCORE: 18
TURNING FROM BACK TO SIDE WHILE IN FLAT BAD: A LITTLE
MOVING FROM LYING ON BACK TO SITTING ON SIDE OF FLAT BED WITH BEDRAILS: A LITTLE
TOILETING: A LITTLE
TOILETING: A LITTLE
EATING MEALS: A LITTLE
CLIMB 3 TO 5 STEPS WITH RAILING: A LITTLE
WALKING IN HOSPITAL ROOM: A LITTLE
STANDING UP FROM CHAIR USING ARMS: A LITTLE
DRESSING REGULAR LOWER BODY CLOTHING: A LITTLE
PERSONAL GROOMING: A LITTLE
MOVING TO AND FROM BED TO CHAIR: A LITTLE
DAILY ACTIVITIY SCORE: 18
MOBILITY SCORE: 18
DRESSING REGULAR UPPER BODY CLOTHING: A LITTLE
MOVING FROM LYING ON BACK TO SITTING ON SIDE OF FLAT BED WITH BEDRAILS: A LITTLE
STANDING UP FROM CHAIR USING ARMS: A LITTLE
HELP NEEDED FOR BATHING: A LITTLE

## 2025-01-02 ASSESSMENT — PAIN SCALES - GENERAL
PAINLEVEL_OUTOF10: 0 - NO PAIN
PAINLEVEL_OUTOF10: 0 - NO PAIN
PAINLEVEL_OUTOF10: 8
PAINLEVEL_OUTOF10: 0 - NO PAIN

## 2025-01-02 ASSESSMENT — PAIN - FUNCTIONAL ASSESSMENT
PAIN_FUNCTIONAL_ASSESSMENT: 0-10
PAIN_FUNCTIONAL_ASSESSMENT: NO/DENIES PAIN
PAIN_FUNCTIONAL_ASSESSMENT: 0-10

## 2025-01-02 ASSESSMENT — ACTIVITIES OF DAILY LIVING (ADL): LACK_OF_TRANSPORTATION: NO

## 2025-01-02 ASSESSMENT — PAIN DESCRIPTION - LOCATION
LOCATION: HEAD
LOCATION: OTHER (COMMENT)

## 2025-01-02 NOTE — PROGRESS NOTES
Daphne Morris is a 69 y.o. female on day 2 of admission presenting with Influenza.      Subjective   Pt seen this morning, on 3L, afebrile, no overnight events reported.        Objective     Last Recorded Vitals  /67   Pulse 68   Temp 36.6 °C (97.9 °F) (Oral)   Resp 18   Wt 70.7 kg (155 lb 13.8 oz)   SpO2 100% Comment: 3L NC  Intake/Output last 3 Shifts:    Intake/Output Summary (Last 24 hours) at 1/2/2025 1538  Last data filed at 1/2/2025 1349  Gross per 24 hour   Intake 1890 ml   Output 2700 ml   Net -810 ml       Admission Weight  Weight: 70.7 kg (155 lb 13.8 oz) (01/02/25 0600)    Daily Weight  01/02/25 : 70.7 kg (155 lb 13.8 oz)    Image Results  ECG 12 lead  Sinus rhythm with 1st degree AV block  Left axis deviation  Left bundle branch block  Abnormal ECG  When compared with ECG of 29-DEC-2024 17:49, (unconfirmed)  Sinus rhythm has replaced Wide QRS rhythm  ECG 12 lead  Wide QRS rhythm  Left axis deviation  Left bundle branch block  Abnormal ECG  When compared with ECG of 26-DEC-2024 08:17,  Wide QRS rhythm has replaced Sinus rhythm      Physical Exam  Constitutional:       Appearance: Normal appearance.   Cardiovascular:      Rate and Rhythm: Normal rate and regular rhythm.   Pulmonary:      Effort: Pulmonary effort is normal.      Breath sounds: Rales present.   Abdominal:      General: Abdomen is flat. Bowel sounds are normal.      Palpations: Abdomen is soft.   Musculoskeletal:      Cervical back: Normal range of motion.   Skin:     General: Skin is warm.   Neurological:      General: No focal deficit present.      Mental Status: She is alert.         Relevant Results    Assessment & Plan  Influenza            1/2:  Aecopd, hypercapnia, home o2 3L, uses nocturnal bipap, flu+... last blood gas still retaining... cont nocturnal bipap, iv steroids, pulm recs, will recheck blood gas now and may need daytime as well.     Metabolic encephalopathy... related to hypercapnia, acute illness, hypoNa,  likely underling dementia... more awake but confused, unclear baseline but she admits to medication and bipap non compliance. Says she lives at home alone.     R pleural effusion, moderate... thoracentesis, needs family for consent.     Uncontrolled hypothyroidism... likely med non compliance... resume home regimen, monitor OP.     ESRD... HD today.   HypoNa.... correct in HD, monitor.   HyperK... HD today, monitor.     Hx afib... home regimen with amio, bb.   Hx CVA/CAD/PVD... asa, plavix, statin.     DM, A1c 8.8%... lantus 10, ss.     Deconditioning... pt/ot    Home regimen for chronic conditions.   Dvt px with heparin.     Dispo is most likely snf.   Palliative consult may be appropriate.         1/1:    - pt with allergy to Tamiflu, therefore did not start  - supportive therapy    Hypercapnic respiratory failure  - has a hx of COPD, with baseline oxygen requirement of 3 LPM, however, recently had been requiring 5 LPM.   - found down at home for uncertain amt of time, possibly 16+ hrs off oxygen  - PaCO2 was improving at [facility], however, upon arrival to [facility] CO2 has increased.   - Continue BiPAP, will repeat ABG in a few hrs to check for improvement  - solumedrol for COPD exacerbation  - albuterol MDI, have not ordered duonebs due to Influenza.     Atrial fibrillation  - telemetry  - Amiodarone  - continue metoprolol for rate control  - does not appear to be on AC    Hypothyroidism  - continue synthroid, check TSH    PVD  - Plavix    DM  - continue Lantus, ISS  - currently pt is hypoglycemic; hypoglycemia protocol  - will start D10 if needed, however, need to limit volume due to HD    ESRD on HD  - T, Th, Sat  - nephrology consult                  Ricki Rojas MD

## 2025-01-02 NOTE — ASSESSMENT & PLAN NOTE
1/2:  Aecopd, hypercapnia, home o2 3L, uses nocturnal bipap, flu+... last blood gas still retaining... cont nocturnal bipap, iv steroids, pulm recs, will recheck blood gas now and may need daytime as well.     Metabolic encephalopathy... related to hypercapnia, acute illness, hypoNa, likely underling dementia... more awake but confused, unclear baseline but she admits to medication and bipap non compliance. Says she lives at home alone.     R pleural effusion, moderate... thoracentesis, needs family for consent.     Uncontrolled hypothyroidism... likely med non compliance... resume home regimen, monitor OP.     ESRD... HD today.   HypoNa.... correct in HD, monitor.   HyperK... HD today, monitor.     Hx afib... home regimen with amio, bb.   Hx CVA/CAD/PVD... asa, plavix, statin.     DM, A1c 8.8%... lantus 10, ss.     Deconditioning... pt/ot    Home regimen for chronic conditions.   Dvt px with heparin.     Dispo is most likely snf.   Palliative consult may be appropriate.         1/1:    - pt with allergy to Tamiflu, therefore did not start  - supportive therapy    Hypercapnic respiratory failure  - has a hx of COPD, with baseline oxygen requirement of 3 LPM, however, recently had been requiring 5 LPM.   - found down at home for uncertain amt of time, possibly 16+ hrs off oxygen  - PaCO2 was improving at [facility], however, upon arrival to [facility] CO2 has increased.   - Continue BiPAP, will repeat ABG in a few hrs to check for improvement  - solumedrol for COPD exacerbation  - albuterol MDI, have not ordered duonebs due to Influenza.     Atrial fibrillation  - telemetry  - Amiodarone  - continue metoprolol for rate control  - does not appear to be on AC    Hypothyroidism  - continue synthroid, check TSH    PVD  - Plavix    DM  - continue Lantus, ISS  - currently pt is hypoglycemic; hypoglycemia protocol  - will start D10 if needed, however, need to limit volume due to HD    ESRD on HD  - T, Th, Sat  - nephrology  consult

## 2025-01-02 NOTE — PROGRESS NOTES
Daphne Morris is a 69 y.o. female on day 2 of admission presenting with Influenza.      Subjective   Better with no new complaints       Objective        Vitals 24HR  Heart Rate:  [63-74]   Temp:  [36.5 °C (97.7 °F)-37.1 °C (98.7 °F)]   Resp:  [16-20]   BP: (134-161)/(71-82)   Weight:  [70.7 kg (155 lb 13.8 oz)]   SpO2:  [91 %-100 %]     Intake/Output last 3 Shifts:    Intake/Output Summary (Last 24 hours) at 1/2/2025 1216  Last data filed at 1/2/2025 0915  Gross per 24 hour   Intake 890 ml   Output 100 ml   Net 790 ml       Physical Exam  GEN appearance: Ill-appearing lady on 4 L oxygen via nasal cannula  Head and ENT: Normocephalic/atraumatic/supple neck/no JVD  Lungs: Bilateral rhonchi  Heart: RRR  Abdomen; no tenderness organomegaly  Extremities; no edema    Dialysis access= right upper arm AV fistula with positive bruit and thrill            Relevant Results     Results from last 7 days   Lab Units 01/02/25  0500 01/01/25  0058 12/31/24  1730   WBC AUTO x10*3/uL 6.6 5.6 6.3   HEMOGLOBIN g/dL 10.5* 10.8* 11.2*   HEMATOCRIT % 32.6* 33.9* 34.9*   PLATELETS AUTO x10*3/uL 195 185 202      Results from last 7 days   Lab Units 01/02/25  0500 01/01/25  0620 01/01/25  0058   SODIUM mmol/L 125* 127* 129*   POTASSIUM mmol/L 5.7* 4.7 4.9   CHLORIDE mmol/L 89* 93* 95*   CO2 mmol/L 25 24 22   BUN mg/dL 67* 62* 64*   CREATININE mg/dL 4.78* 4.57* 4.53*   GLUCOSE mg/dL 149* 139* 118*   CALCIUM mg/dL 6.8* 6.4* 6.3*        Current Facility-Administered Medications:     acetaminophen (Tylenol) tablet 650 mg, 650 mg, oral, q4h PRN, 650 mg at 01/02/25 0026 **OR** acetaminophen (Tylenol) oral liquid 650 mg, 650 mg, oral, q4h PRN **OR** acetaminophen (Tylenol) suppository 650 mg, 650 mg, rectal, q4h PRN, Ghazal Gomez MD    amiodarone (Pacerone) tablet 200 mg, 200 mg, oral, Daily with breakfast, Ghazal Gomez MD, 200 mg at 01/01/25 0856    amLODIPine (Norvasc) tablet 5 mg, 5 mg, oral, Daily, Ghazal Gomez MD, 5 mg at 01/02/25  0914    aspirin EC tablet 81 mg, 81 mg, oral, Daily, Ghazal Gomez MD, 81 mg at 01/02/25 0914    atorvastatin (Lipitor) tablet 40 mg, 40 mg, oral, Daily, Ghazal Gomez MD, 40 mg at 01/02/25 0914    clopidogrel (Plavix) tablet 75 mg, 75 mg, oral, Daily, Ghazal Gomez MD, 75 mg at 01/02/25 0914    dextrose 50 % injection 12.5 g, 12.5 g, intravenous, q15 min PRN, Ghazal Gomez MD, 12.5 g at 01/01/25 0000    dextrose 50 % injection 25 g, 25 g, intravenous, q15 min PRN, Ghazal Gomez MD, 25 g at 01/01/25 0533    ferrous gluconate (Fergon) 324 (38 Fe) mg tablet 324 mg, 324 mg, oral, Daily with breakfast, Ghazal Gomez MD, 324 mg at 01/01/25 0857    glucagon (Glucagen) injection 1 mg, 1 mg, intramuscular, q15 min PRN, Ghazal Gomez MD    glucagon (Glucagen) injection 1 mg, 1 mg, intramuscular, q15 min PRN, Ghazal Gomez MD    heparin (porcine) injection 5,000 Units, 5,000 Units, subcutaneous, q8h SUMIT, Ghazal Gomez MD, 5,000 Units at 01/02/25 0609    insulin glargine (Lantus) injection 10 Units, 10 Units, subcutaneous, Nightly, Ghazal Gomez MD, 10 Units at 01/01/25 2120    insulin lispro injection 0-10 Units, 0-10 Units, subcutaneous, q4h, Ghazal Gomez MD, 4 Units at 01/01/25 1330    ipratropium-albuteroL (Duo-Neb) 0.5-2.5 mg/3 mL nebulizer solution 3 mL, 3 mL, nebulization, TID, Ghazal Gomez MD, 3 mL at 01/01/25 2009    ipratropium-albuteroL (Duo-Neb) 0.5-2.5 mg/3 mL nebulizer solution 3 mL, 3 mL, nebulization, q2h PRN, Ghazal Gomez MD    levothyroxine (Synthroid, Levoxyl) tablet 250 mcg, 250 mcg, oral, Daily before breakfast, Ghazal Gomez MD, 250 mcg at 01/02/25 0609    methylPREDNISolone sod succinate (SOLU-Medrol) 40 mg/mL injection 40 mg, 40 mg, intravenous, q8h, Ghazal Gomez MD, 40 mg at 01/02/25 0220    metoprolol succinate XL (Toprol-XL) 24 hr tablet 50 mg, 50 mg, oral, BID, Ghazal Gomez MD, 50 mg at 01/02/25 0914    ondansetron ODT (Zofran-ODT) disintegrating tablet 4 mg, 4 mg, oral, q8h PRN  **OR** ondansetron (Zofran) injection 4 mg, 4 mg, intravenous, q8h PRN, Ghazal Gomez MD, 4 mg at 01/02/25 0219    oxygen (O2) therapy, , inhalation, Continuous - Inhalation, Ricki Rojas MD, 3 L/min at 01/02/25 0331    oxygen (O2) therapy, , inhalation, Continuous PRN - O2/gases, Lewis Yeung MD    pantoprazole (ProtoNix) EC tablet 40 mg, 40 mg, oral, BID, Ghazal Gomez MD, 40 mg at 01/02/25 0914    venlafaxine XR (Effexor-XR) 24 hr capsule 150 mg, 150 mg, oral, Daily, Ghazal Gomez MD, 150 mg at 01/02/25 0914           Assessment/Plan   1.  ESKD on hemodialysis at [facility] in [place] while via right upper arm AV fistula,  Patient be dialyzed today  2.  Influenza type A currently in isolation  3.  COPD on 3 L oxygen per minute  4.  Atrial fibrillation follow-up with cardiology  5.  Hyponatremia will limit free water intake to 1000 milliliters per 24 hours      Continue all management as baseline follow-up with other consultants on daily basis with BMP and CBC  Assessment & Plan  Influenza              I spent 35 minutes in the professional and overall care of this patient.      Marleny Patel MD

## 2025-01-02 NOTE — CARE PLAN
The patient's goals for the shift include      The clinical goals for the shift include  VSS, remain safe throughout shift  Problem: Pain - Adult  Goal: Verbalizes/displays adequate comfort level or baseline comfort level  Outcome: Progressing     Problem: Safety - Adult  Goal: Free from fall injury  Outcome: Progressing     Problem: Discharge Planning  Goal: Discharge to home or other facility with appropriate resources  Outcome: Progressing     Problem: Chronic Conditions and Co-morbidities  Goal: Patient's chronic conditions and co-morbidity symptoms are monitored and maintained or improved  Outcome: Progressing     Problem: Respiratory  Goal: Clear secretions with interventions this shift  Outcome: Progressing  Goal: Minimize anxiety/maximize coping throughout shift  Outcome: Progressing  Goal: Minimal/no exertional discomfort or dyspnea this shift  Outcome: Progressing  Goal: No signs of respiratory distress (eg. Use of accessory muscles. Peds grunting)  Outcome: Progressing  Goal: Patent airway maintained this shift  Outcome: Progressing  Goal: Tolerate mechanical ventilation evidenced by VS/agitation level this shift  Outcome: Progressing  Goal: Tolerate pulmonary toileting this shift  Outcome: Progressing  Goal: Verbalize decreased shortness of breath this shift  Outcome: Progressing  Goal: Wean oxygen to maintain O2 saturation per order/standard this shift  Outcome: Progressing  Goal: Increase self care and/or family involvement in next 24 hours  Outcome: Progressing       Over the shift, the patient did not make progress toward the following goals. Barriers to progression include . Recommendations to address these barriers include .

## 2025-01-02 NOTE — POST-PROCEDURE NOTE
..Report to Receiving RN:    Report To: BEATRICE Greene  Time Report Called: 1250  Hand-Off Communication: fluid removal of 2L and blood pressure 171/83 HR 71.  Complications During Treatment: No  Ultrafiltration Treatment: No  Medications Administered During Dialysis: No  Blood Products Administered During Dialysis: No  Labs Sent During Dialysis: No  Heparin Drip Rate Changes: N/A  Dialysis Catheter Dressing: N/A  Last Dressing Change: N/A    Electronic Signatures:   (Signed )   Authored:    (Signed )   Authored:     Last Updated: 1:52 PM by FREEDOM FREEMAN

## 2025-01-02 NOTE — CARE PLAN
Brought Daphne down for a right sided thoracentesis. Once in ultrasound, it was deemed Daphne was not able to give consent despite the nurse to nurse report. Attempted to call her Nephew Silver and her friend Doug (listed as POA and alternate POA) and left a message to call back. After a second attempt was made with no answer, we are going to abort the procedure today. We will plan for a thoracentesis tomorrow once consent is obtained.     Thoracentesis was not performed today.

## 2025-01-02 NOTE — PRE-PROCEDURE NOTE
..Report from Sending RN:    Report From: BEATRICE Greene  Recent Surgery of Procedure: No  Baseline Level of Consciousness (LOC): x3  Oxygen Use: Yes  Type: room air  Diabetic: Yes  Last BP Med Given Day of Dialysis: none  Last Pain Med Given: none  Lab Tests to be Obtained with Dialysis: No  Blood Transfusion to be Given During Dialysis: No  Available IV Access: Yes  Medications to be Administered During Dialysis: No  Continuous IV Infusion Running: No  Restraints on Currently or in the Last 24 Hours: No  Hand-Off Communication: Patient is stable and ready for dialysis  Dialysis Catheter Dressing: N/A  Last Dressing Change: N/A

## 2025-01-02 NOTE — PROGRESS NOTES
01/02/25 1254   Discharge Planning   Living Arrangements Alone   Support Systems Family members;Friends/neighbors   Assistance Needed Independent   Type of Residence Private residence   Number of Stairs to Enter Residence 4   Number of Stairs Within Residence 0   Do you have animals or pets at home? Yes   Type of Animals or Pets one cat   Who is requesting discharge planning? Provider   Home or Post Acute Services None   Expected Discharge Disposition Home   Does the patient need discharge transport arranged? Yes   RoundTrip coordination needed? No   Has discharge transport been arranged? No   Financial Resource Strain   How hard is it for you to pay for the very basics like food, housing, medical care, and heating? Not hard   Housing Stability   In the last 12 months, was there a time when you were not able to pay the mortgage or rent on time? N   In the past 12 months, how many times have you moved where you were living? 1   At any time in the past 12 months, were you homeless or living in a shelter (including now)? N   Transportation Needs   In the past 12 months, has lack of transportation kept you from medical appointments or from getting medications? no   In the past 12 months, has lack of transportation kept you from meetings, work, or from getting things needed for daily living? No   Patient Choice   Provider Choice list and CMS website (https://medicare.gov/care-compare#search) for post-acute Quality and Resource Measure Data were provided and reviewed with: Patient   Patient / Family choosing to utilize agency / facility established prior to hospitalization No   Stroke Family Assessment   Stroke Family Assessment Needed No   Intensity of Service   Intensity of Service 0-30 min           Met with patient at bedside and explained my role as care coordinator. She lives alone in a mobile home. She is independent with her care at home. She has grab bars. Patient has oxygen at night time, 3L NC and her  supplier is [employer]. She is dialysis patient and goes to [facility] and her days are T-Th-Sat at 7 am. Nephew Silver  transports to HD. Her PCP is Dr. Poly Diaz CNP and she seen her one month  ago.  Family and friends transport to appointments and grocery shopping. Pharmacy she uses is [email or web address] in [place]. She is able to afford medications. Patient is on isolation for Flu. She is getting dialysis currently. Plan is for right thoracentesis. PT/OT is pending. Will continue to follow for discharge needs.

## 2025-01-02 NOTE — PROGRESS NOTES
"Daphne Morris is a 69 y.o. female on day 2 of admission presenting with Influenza.    Subjective   Feels \"good\".  Denies shortness of breath, pain or cough.  On 3 L nasal cannula oxygen with an oxygen saturation of 100%.  Has BiPAP (18/8 with a rate of 18 and 40% FiO2) in her room.     Objective   Physical Exam  Vitals  Blood pressure 145/82, pulse 63, temperature 36.8 °C (98.2 °F), temperature source Temporal, resp. rate 20, weight 70.7 kg (155 lb 13.8 oz), SpO2 100%.  Intake/Output last 3 Shifts:  I/O last 3 completed shifts:  In: 1430 (20.2 mL/kg) [P.O.:1430]  Out: 100 (1.4 mL/kg) [Urine:100 (0 mL/kg/hr)]  Weight: 70.7 kg     General-awake, alert and in no acute distress.  Lungs-faint bilateral wheezes without rales or rhonchi.  Heart-regular rate and rhythm.  Abdomen-positive bowel sounds.  Extremities-no edema.  Skin-no rashes.    Scheduled medications  amiodarone, 200 mg, oral, Daily with breakfast  amLODIPine, 5 mg, oral, Daily  aspirin, 81 mg, oral, Daily  atorvastatin, 40 mg, oral, Daily  clopidogrel, 75 mg, oral, Daily  ferrous gluconate, 324 mg, oral, Daily with breakfast  heparin (porcine), 5,000 Units, subcutaneous, q8h SUMIT  insulin glargine, 10 Units, subcutaneous, Nightly  insulin lispro, 0-10 Units, subcutaneous, q4h  ipratropium-albuteroL, 3 mL, nebulization, TID  levothyroxine, 250 mcg, oral, Daily before breakfast  methylPREDNISolone sodium succinate (PF), 40 mg, intravenous, q8h  metoprolol succinate XL, 50 mg, oral, BID  oxygen, , inhalation, Continuous - Inhalation  pantoprazole, 40 mg, oral, BID  venlafaxine XR, 150 mg, oral, Daily      Continuous medications     PRN medications  PRN medications: acetaminophen **OR** acetaminophen **OR** acetaminophen, dextrose, dextrose, glucagon, glucagon, ipratropium-albuteroL, ondansetron ODT **OR** ondansetron     Results for orders placed or performed during the hospital encounter of 12/31/24 (from the past 24 hours)   POCT GLUCOSE   Result Value Ref " Range    POCT Glucose 154 (H) 74 - 99 mg/dL   POCT GLUCOSE   Result Value Ref Range    POCT Glucose 102 (H) 74 - 99 mg/dL   POCT GLUCOSE   Result Value Ref Range    POCT Glucose 135 (H) 74 - 99 mg/dL   POCT GLUCOSE   Result Value Ref Range    POCT Glucose 161 (H) 74 - 99 mg/dL   Comprehensive Metabolic Panel   Result Value Ref Range    Glucose 149 (H) 74 - 99 mg/dL    Sodium 125 (L) 136 - 145 mmol/L    Potassium 5.7 (H) 3.5 - 5.3 mmol/L    Chloride 89 (L) 98 - 107 mmol/L    Bicarbonate 25 21 - 32 mmol/L    Anion Gap 17 10 - 20 mmol/L    Urea Nitrogen 67 (H) 6 - 23 mg/dL    Creatinine 4.78 (H) 0.50 - 1.05 mg/dL    eGFR 9 (L) >60 mL/min/1.73m*2    Calcium 6.8 (L) 8.6 - 10.3 mg/dL    Albumin 3.1 (L) 3.4 - 5.0 g/dL    Alkaline Phosphatase 78 33 - 136 U/L    Total Protein 5.6 (L) 6.4 - 8.2 g/dL    AST 29 9 - 39 U/L    Bilirubin, Total 0.4 0.0 - 1.2 mg/dL    ALT 11 7 - 45 U/L   CBC   Result Value Ref Range    WBC 6.6 4.4 - 11.3 x10*3/uL    nRBC 0.0 0.0 - 0.0 /100 WBCs    RBC 3.35 (L) 4.00 - 5.20 x10*6/uL    Hemoglobin 10.5 (L) 12.0 - 16.0 g/dL    Hematocrit 32.6 (L) 36.0 - 46.0 %    MCV 97 80 - 100 fL    MCH 31.3 26.0 - 34.0 pg    MCHC 32.2 32.0 - 36.0 g/dL    RDW 13.4 11.5 - 14.5 %    Platelets 195 150 - 450 x10*3/uL   POCT GLUCOSE   Result Value Ref Range    POCT Glucose 175 (H) 74 - 99 mg/dL     *Note: Due to a large number of results and/or encounters for the requested time period, some results have not been displayed. A complete set of results can be found in Results Review.      Assessment:  69-year-old woman with history of hypertension, coronary artery disease, congestive heart failure, peripheral vascular disease, diabetes, atrial fibrillation, COPD, chronic hypoxia-on nocturnal oxygen, hypothyroidism, left CVA, chronic renal failure-on dialysis, anemia, depression, right pleural effusion, DJD, renal cell carcinoma-status post partial nephrectomy, TIA, vasculitis, left atrial thrombus and obstructive sleep apnea,  admitted after being found down on the floor at home for several hours, and found to have positive influenza A infection, hyponatremia, elevated BNP and CPK, elevated TSH and an abnormal chest x-ray showing cardiomegaly with a right pleural effusion, pulmonary edema and right lower lobe compressive atelectasis.  The patient has possible/probable rhabdomyolysis, influenza A infection, a mild COPD exacerbation and fluid overload from renal failure, leading to acute-on-chronic hypoxic and hypercapnic respiratory failure.  She has a right pleural effusion of uncertain significance.  Overall, her oxygenation is improved.  There is no bronchospasm presently.    Recommend:  1.  Continue DuoNeb, Solu-Medrol, Synthroid and subcutaneous heparin.  2.  Wean FiO2 to keep oxygen saturation approximately 92 to 95%; home oxygen evaluation prior to discharge; BiPAP from 10 PM to 7 AM for sleep apnea.  3.  Incentive spirometry and Acapella treatment, if the patient is able to cooperate.  4.  The patient may require hemodialysis today.  5.  Ultrasound-guided right thoracentesis with right pleural fluid for LDH, total protein, cell count and differential, culture for bacteria and cytology, with a simultaneous serum LDH and total protein.  6.  Follow-up with pulmonary/sleep medicine after discharge.    Lewis Yeung MD

## 2025-01-02 NOTE — TELEPHONE ENCOUNTER
Patient was phoned 12/30/24 after  [facility] and a [facility] visit was scheduled with Kellie Gilbert NP 1/8/25. Patient subsequently admitted to [facility] 1/1/25, she was transferred from [facility] for higher level of care with hypercapnic respiratory failure. Per H/P: Apparently she was found down at home, possibly down for 16 hours. Baseline oxygen requirement of 3 LPM, however, recently she had been requiring 5 LPM. BiPap initiated. She tested positive for influenza A. CXR showed cardiomegaly with a moderate Rt pleural effusion with Rt basilar compressive atelectasis. CT scan of the head showed no acute changes. [facility] will monitor hospitalization and discharge plan.

## 2025-01-03 ENCOUNTER — APPOINTMENT (OUTPATIENT)
Dept: RADIOLOGY | Facility: HOSPITAL | Age: 70
End: 2025-01-03
Payer: MEDICARE

## 2025-01-03 LAB
ALBUMIN SERPL BCP-MCNC: 3.4 G/DL (ref 3.4–5)
ALP SERPL-CCNC: 77 U/L (ref 33–136)
ALT SERPL W P-5'-P-CCNC: 11 U/L (ref 7–45)
ANION GAP BLDA CALCULATED.4IONS-SCNC: 10 MMO/L (ref 10–25)
ANION GAP SERPL CALC-SCNC: 14 MMOL/L (ref 10–20)
AST SERPL W P-5'-P-CCNC: 20 U/L (ref 9–39)
BASE EXCESS BLDA CALC-SCNC: -0.2 MMOL/L (ref -2–3)
BASOPHILS NFR FLD MANUAL: 0 %
BILIRUB SERPL-MCNC: 0.4 MG/DL (ref 0–1.2)
BLASTS NFR FLD MANUAL: 0 %
BODY TEMPERATURE: 37 DEGREES CELSIUS
BUN SERPL-MCNC: 40 MG/DL (ref 6–23)
CA-I BLDA-SCNC: 1.12 MMOL/L (ref 1.1–1.33)
CALCIUM SERPL-MCNC: 7.7 MG/DL (ref 8.6–10.3)
CHLORIDE BLDA-SCNC: 92 MMOL/L (ref 98–107)
CHLORIDE SERPL-SCNC: 91 MMOL/L (ref 98–107)
CLARITY FLD: CLEAR
CO2 SERPL-SCNC: 29 MMOL/L (ref 21–32)
COLOR FLD: NORMAL
CREAT SERPL-MCNC: 3.76 MG/DL (ref 0.5–1.05)
EGFRCR SERPLBLD CKD-EPI 2021: 12 ML/MIN/1.73M*2
EOSINOPHIL NFR FLD MANUAL: 0 %
ERYTHROCYTE [DISTWIDTH] IN BLOOD BY AUTOMATED COUNT: 13.7 % (ref 11.5–14.5)
GLUCOSE BLD MANUAL STRIP-MCNC: 106 MG/DL (ref 74–99)
GLUCOSE BLD MANUAL STRIP-MCNC: 118 MG/DL (ref 74–99)
GLUCOSE BLD MANUAL STRIP-MCNC: 119 MG/DL (ref 74–99)
GLUCOSE BLD MANUAL STRIP-MCNC: 138 MG/DL (ref 74–99)
GLUCOSE BLD MANUAL STRIP-MCNC: 191 MG/DL (ref 74–99)
GLUCOSE BLDA-MCNC: 150 MG/DL (ref 74–99)
GLUCOSE SERPL-MCNC: 120 MG/DL (ref 74–99)
HCO3 BLDA-SCNC: 28.1 MMOL/L (ref 22–26)
HCT VFR BLD AUTO: 37.9 % (ref 36–46)
HCT VFR BLD EST: 33 % (ref 36–46)
HGB BLD-MCNC: 11.4 G/DL (ref 12–16)
HGB BLDA-MCNC: 10.9 G/DL (ref 12–16)
HOLD SPECIMEN: NORMAL
IMMATURE GRANULOCYTES IN FLUID: 0 %
INHALED O2 CONCENTRATION: 32 %
LACTATE BLDA-SCNC: 0.4 MMOL/L (ref 0.4–2)
LDH FLD L TO P-CCNC: 84 U/L
LYMPHOCYTES NFR FLD MANUAL: 23 %
MCH RBC QN AUTO: 31 PG (ref 26–34)
MCHC RBC AUTO-ENTMCNC: 30.1 G/DL (ref 32–36)
MCV RBC AUTO: 103 FL (ref 80–100)
MONOS+MACROS NFR FLD MANUAL: 69 %
NEUTROPHILS NFR FLD MANUAL: 8 %
NRBC BLD-RTO: 0 /100 WBCS (ref 0–0)
OTHER CELLS NFR FLD MANUAL: 0 %
OXYHGB MFR BLDA: 96.6 % (ref 94–98)
PCO2 BLDA: 64 MM HG (ref 38–42)
PH BLDA: 7.25 PH (ref 7.38–7.42)
PLASMA CELLS NFR FLD MANUAL: 0 %
PLATELET # BLD AUTO: 202 X10*3/UL (ref 150–450)
PO2 BLDA: 97 MM HG (ref 85–95)
POTASSIUM BLDA-SCNC: 5.1 MMOL/L (ref 3.5–5.3)
POTASSIUM SERPL-SCNC: 5 MMOL/L (ref 3.5–5.3)
PROT FLD-MCNC: 2.2 G/DL
PROT SERPL-MCNC: 6 G/DL (ref 6.4–8.2)
RBC # BLD AUTO: 3.68 X10*6/UL (ref 4–5.2)
RBC # FLD AUTO: 7 /UL
SAO2 % BLDA: 98 % (ref 94–100)
SODIUM BLDA-SCNC: 125 MMOL/L (ref 136–145)
SODIUM SERPL-SCNC: 129 MMOL/L (ref 136–145)
TOTAL CELLS COUNTED FLD: 100
WBC # BLD AUTO: 7.3 X10*3/UL (ref 4.4–11.3)
WBC # FLD AUTO: 56 /UL

## 2025-01-03 PROCEDURE — 32555 ASPIRATE PLEURA W/ IMAGING: CPT

## 2025-01-03 PROCEDURE — 36600 WITHDRAWAL OF ARTERIAL BLOOD: CPT

## 2025-01-03 PROCEDURE — 0W993ZZ DRAINAGE OF RIGHT PLEURAL CAVITY, PERCUTANEOUS APPROACH: ICD-10-PCS | Performed by: NURSE PRACTITIONER

## 2025-01-03 PROCEDURE — 86706 HEP B SURFACE ANTIBODY: CPT | Mod: AHULAB | Performed by: INTERNAL MEDICINE

## 2025-01-03 PROCEDURE — 97161 PT EVAL LOW COMPLEX 20 MIN: CPT | Mod: GP | Performed by: PHYSICAL THERAPIST

## 2025-01-03 PROCEDURE — 88305 TISSUE EXAM BY PATHOLOGIST: CPT | Performed by: PATHOLOGY

## 2025-01-03 PROCEDURE — 89051 BODY FLUID CELL COUNT: CPT | Performed by: NURSE PRACTITIONER

## 2025-01-03 PROCEDURE — 82947 ASSAY GLUCOSE BLOOD QUANT: CPT

## 2025-01-03 PROCEDURE — 2500000002 HC RX 250 W HCPCS SELF ADMINISTERED DRUGS (ALT 637 FOR MEDICARE OP, ALT 636 FOR OP/ED): Performed by: HOSPITALIST

## 2025-01-03 PROCEDURE — 82435 ASSAY OF BLOOD CHLORIDE: CPT | Performed by: INTERNAL MEDICINE

## 2025-01-03 PROCEDURE — 2500000004 HC RX 250 GENERAL PHARMACY W/ HCPCS (ALT 636 FOR OP/ED): Performed by: INTERNAL MEDICINE

## 2025-01-03 PROCEDURE — 94640 AIRWAY INHALATION TREATMENT: CPT

## 2025-01-03 PROCEDURE — 2500000004 HC RX 250 GENERAL PHARMACY W/ HCPCS (ALT 636 FOR OP/ED): Performed by: NURSE PRACTITIONER

## 2025-01-03 PROCEDURE — 36415 COLL VENOUS BLD VENIPUNCTURE: CPT | Performed by: INTERNAL MEDICINE

## 2025-01-03 PROCEDURE — 88112 CYTOPATH CELL ENHANCE TECH: CPT | Mod: TC | Performed by: NURSE PRACTITIONER

## 2025-01-03 PROCEDURE — 94660 CPAP INITIATION&MGMT: CPT

## 2025-01-03 PROCEDURE — 1200000002 HC GENERAL ROOM WITH TELEMETRY DAILY

## 2025-01-03 PROCEDURE — 83615 LACTATE (LD) (LDH) ENZYME: CPT | Mod: AHULAB | Performed by: NURSE PRACTITIONER

## 2025-01-03 PROCEDURE — 97530 THERAPEUTIC ACTIVITIES: CPT | Mod: GP | Performed by: PHYSICAL THERAPIST

## 2025-01-03 PROCEDURE — 85027 COMPLETE CBC AUTOMATED: CPT | Performed by: INTERNAL MEDICINE

## 2025-01-03 PROCEDURE — 97165 OT EVAL LOW COMPLEX 30 MIN: CPT | Mod: GO

## 2025-01-03 PROCEDURE — 87205 SMEAR GRAM STAIN: CPT | Mod: AHULAB | Performed by: NURSE PRACTITIONER

## 2025-01-03 PROCEDURE — 2500000001 HC RX 250 WO HCPCS SELF ADMINISTERED DRUGS (ALT 637 FOR MEDICARE OP): Performed by: HOSPITALIST

## 2025-01-03 PROCEDURE — 97535 SELF CARE MNGMENT TRAINING: CPT | Mod: GO

## 2025-01-03 PROCEDURE — 84157 ASSAY OF PROTEIN OTHER: CPT | Mod: AHULAB | Performed by: NURSE PRACTITIONER

## 2025-01-03 PROCEDURE — 87340 HEPATITIS B SURFACE AG IA: CPT | Mod: AHULAB | Performed by: INTERNAL MEDICINE

## 2025-01-03 PROCEDURE — 84075 ASSAY ALKALINE PHOSPHATASE: CPT | Performed by: INTERNAL MEDICINE

## 2025-01-03 PROCEDURE — 2500000004 HC RX 250 GENERAL PHARMACY W/ HCPCS (ALT 636 FOR OP/ED): Performed by: HOSPITALIST

## 2025-01-03 PROCEDURE — 99233 SBSQ HOSP IP/OBS HIGH 50: CPT | Performed by: INTERNAL MEDICINE

## 2025-01-03 PROCEDURE — 88112 CYTOPATH CELL ENHANCE TECH: CPT | Performed by: PATHOLOGY

## 2025-01-03 PROCEDURE — 2500000005 HC RX 250 GENERAL PHARMACY W/O HCPCS: Performed by: INTERNAL MEDICINE

## 2025-01-03 RX ORDER — INSULIN LISPRO 100 [IU]/ML
0-10 INJECTION, SOLUTION INTRAVENOUS; SUBCUTANEOUS
Status: DISCONTINUED | OUTPATIENT
Start: 2025-01-03 | End: 2025-01-08 | Stop reason: HOSPADM

## 2025-01-03 RX ORDER — LIDOCAINE HYDROCHLORIDE 10 MG/ML
INJECTION, SOLUTION EPIDURAL; INFILTRATION; INTRACAUDAL; PERINEURAL
Status: COMPLETED | OUTPATIENT
Start: 2025-01-03 | End: 2025-01-03

## 2025-01-03 RX ADMIN — INSULIN GLARGINE 10 UNITS: 100 INJECTION, SOLUTION SUBCUTANEOUS at 21:11

## 2025-01-03 RX ADMIN — METOPROLOL SUCCINATE 50 MG: 50 TABLET, EXTENDED RELEASE ORAL at 08:37

## 2025-01-03 RX ADMIN — IPRATROPIUM BROMIDE AND ALBUTEROL SULFATE 3 ML: 2.5; .5 SOLUTION RESPIRATORY (INHALATION) at 07:27

## 2025-01-03 RX ADMIN — VENLAFAXINE HYDROCHLORIDE 150 MG: 75 CAPSULE, EXTENDED RELEASE ORAL at 08:37

## 2025-01-03 RX ADMIN — AMIODARONE HYDROCHLORIDE 200 MG: 200 TABLET ORAL at 08:37

## 2025-01-03 RX ADMIN — LEVOTHYROXINE SODIUM 250 MCG: 0.12 TABLET ORAL at 06:15

## 2025-01-03 RX ADMIN — FERROUS GLUCONATE 324 MG: 324 TABLET ORAL at 08:37

## 2025-01-03 RX ADMIN — Medication 3 L/MIN: at 20:54

## 2025-01-03 RX ADMIN — HEPARIN SODIUM 5000 UNITS: 5000 INJECTION, SOLUTION INTRAVENOUS; SUBCUTANEOUS at 21:11

## 2025-01-03 RX ADMIN — HEPARIN SODIUM 5000 UNITS: 5000 INJECTION, SOLUTION INTRAVENOUS; SUBCUTANEOUS at 13:13

## 2025-01-03 RX ADMIN — Medication 3 L/MIN: at 07:27

## 2025-01-03 RX ADMIN — LIDOCAINE HYDROCHLORIDE 10 ML: 10 INJECTION, SOLUTION EPIDURAL; INFILTRATION; INTRACAUDAL; PERINEURAL at 11:51

## 2025-01-03 RX ADMIN — IPRATROPIUM BROMIDE AND ALBUTEROL SULFATE 3 ML: 2.5; .5 SOLUTION RESPIRATORY (INHALATION) at 20:54

## 2025-01-03 RX ADMIN — METHYLPREDNISOLONE SODIUM SUCCINATE 40 MG: 40 INJECTION, POWDER, FOR SOLUTION INTRAMUSCULAR; INTRAVENOUS at 03:10

## 2025-01-03 RX ADMIN — METHYLPREDNISOLONE SODIUM SUCCINATE 20 MG: 40 INJECTION, POWDER, FOR SOLUTION INTRAMUSCULAR; INTRAVENOUS at 13:12

## 2025-01-03 RX ADMIN — CLOPIDOGREL 75 MG: 75 TABLET ORAL at 08:37

## 2025-01-03 RX ADMIN — PANTOPRAZOLE SODIUM 40 MG: 40 TABLET, DELAYED RELEASE ORAL at 21:11

## 2025-01-03 RX ADMIN — AMLODIPINE BESYLATE 5 MG: 5 TABLET ORAL at 08:37

## 2025-01-03 RX ADMIN — Medication 40 PERCENT: at 12:34

## 2025-01-03 RX ADMIN — ASPIRIN 81 MG: 81 TABLET, COATED ORAL at 08:37

## 2025-01-03 RX ADMIN — PANTOPRAZOLE SODIUM 40 MG: 40 TABLET, DELAYED RELEASE ORAL at 08:37

## 2025-01-03 RX ADMIN — INSULIN LISPRO 2 UNITS: 100 INJECTION, SOLUTION INTRAVENOUS; SUBCUTANEOUS at 21:11

## 2025-01-03 RX ADMIN — IPRATROPIUM BROMIDE AND ALBUTEROL SULFATE 3 ML: 2.5; .5 SOLUTION RESPIRATORY (INHALATION) at 12:34

## 2025-01-03 RX ADMIN — Medication 3 L/MIN: at 08:54

## 2025-01-03 RX ADMIN — ATORVASTATIN CALCIUM 40 MG: 40 TABLET, FILM COATED ORAL at 08:37

## 2025-01-03 ASSESSMENT — PAIN SCALES - GENERAL
PAINLEVEL_OUTOF10: 0 - NO PAIN

## 2025-01-03 ASSESSMENT — COGNITIVE AND FUNCTIONAL STATUS - GENERAL
MOBILITY SCORE: 17
CLIMB 3 TO 5 STEPS WITH RAILING: A LOT
TURNING FROM BACK TO SIDE WHILE IN FLAT BAD: A LITTLE
STANDING UP FROM CHAIR USING ARMS: A LITTLE
MOVING TO AND FROM BED TO CHAIR: A LITTLE
HELP NEEDED FOR BATHING: A LITTLE
TOILETING: A LITTLE
MOVING TO AND FROM BED TO CHAIR: A LITTLE
WALKING IN HOSPITAL ROOM: A LOT
MOVING FROM LYING ON BACK TO SITTING ON SIDE OF FLAT BED WITH BEDRAILS: A LITTLE
EATING MEALS: A LITTLE
STANDING UP FROM CHAIR USING ARMS: A LOT
MOVING FROM LYING ON BACK TO SITTING ON SIDE OF FLAT BED WITH BEDRAILS: A LITTLE
TURNING FROM BACK TO SIDE WHILE IN FLAT BAD: A LOT
DRESSING REGULAR UPPER BODY CLOTHING: A LITTLE
PERSONAL GROOMING: A LITTLE
DRESSING REGULAR UPPER BODY CLOTHING: A LITTLE
EATING MEALS: A LITTLE
PERSONAL GROOMING: A LITTLE
CLIMB 3 TO 5 STEPS WITH RAILING: TOTAL
WALKING IN HOSPITAL ROOM: A LOT
HELP NEEDED FOR BATHING: A LITTLE
DAILY ACTIVITIY SCORE: 18
TOILETING: A LITTLE
DRESSING REGULAR LOWER BODY CLOTHING: A LITTLE
TOILETING: A LITTLE
CLIMB 3 TO 5 STEPS WITH RAILING: A LOT
DAILY ACTIVITIY SCORE: 18
DRESSING REGULAR LOWER BODY CLOTHING: A LOT
MOVING TO AND FROM BED TO CHAIR: A LOT
TURNING FROM BACK TO SIDE WHILE IN FLAT BAD: A LITTLE
MOBILITY SCORE: 13
DRESSING REGULAR LOWER BODY CLOTHING: A LITTLE
DAILY ACTIVITIY SCORE: 18
HELP NEEDED FOR BATHING: A LITTLE
MOBILITY SCORE: 15
WALKING IN HOSPITAL ROOM: A LITTLE
PERSONAL GROOMING: A LITTLE
MOVING FROM LYING ON BACK TO SITTING ON SIDE OF FLAT BED WITH BEDRAILS: A LITTLE
DRESSING REGULAR UPPER BODY CLOTHING: A LITTLE
STANDING UP FROM CHAIR USING ARMS: A LITTLE

## 2025-01-03 ASSESSMENT — ACTIVITIES OF DAILY LIVING (ADL)
ADL_ASSISTANCE: INDEPENDENT
HOME_MANAGEMENT_TIME_ENTRY: 23
ADL_ASSISTANCE: INDEPENDENT

## 2025-01-03 ASSESSMENT — PAIN - FUNCTIONAL ASSESSMENT
PAIN_FUNCTIONAL_ASSESSMENT: 0-10
PAIN_FUNCTIONAL_ASSESSMENT: 0-10

## 2025-01-03 NOTE — POST-PROCEDURE NOTE
Interventional Radiology Brief Postprocedure Note    Attending: Danielle Hutson CNP    Assistant: none    Diagnosis: Pleural Effusion; influenza    Description of procedure: Ultrasound guided thoracentesis was preformed on the right.  1000mL of clear yellow fluid was drained.      Anesthesia:  Local    Complications: None    Estimated Blood Loss: minimal    Specimens: Yes     See detailed result report with images in PACS.    The patient tolerated the procedure well without incident or complication and is in stable condition.

## 2025-01-03 NOTE — PROGRESS NOTES
01/03/25 0847   Discharge Planning   Expected Discharge Disposition Home     I met with this patient at her bedside to discuss discharge planing, patient seemed somewhat confused alertx2, stated she does live home alone, but was going to move into [facility] however she could not remember the name of the facility, she does not remember how she fell out of her chair at home, per notes from ED triage patient was smoking and fell asleep and fell out of her chair, patient did deny smoking while using her oxygen, per notes patient has help from family but has missed a few HD appointments, patient may need possible MOCA eval for safe discharge planing if she is to discharge back home.    09:15 I spoke to Silver, he agrees patient is not safe to discharge home alone, family is working on getting patient over to the [facility] in [place] which is [facility], I have reached out to  to see if we can discharge patient to [facility] or if we may need to discharge to SNF however patient was rec'd Low prior by pt/Ot will need new PT/Ot notes to see if we can get patient skilled.  I will continue to monitor for discharge planing.

## 2025-01-03 NOTE — PROGRESS NOTES
01/03/25 5128   Discharge Planning   Expected Discharge Disposition SNF     Spoke with patient's nephew. Discussed PT OT recommendation of moderate intensity. Nephew agreeable to SNF placement. Nephew identified [facility].    Addendum: [facility] able to accept. Patient will need authorization prior to discharge.

## 2025-01-03 NOTE — ASSESSMENT & PLAN NOTE
1/3:  Still hypercapnic, lethargic, acidotic... repeat abg again today and likely needs to go back on bipap during the day as well as night.     R thoracentesis today.     HypoNa... improving.... per renal in HD.   K normalized.     HD per renal  DM... well controlled.     Pt/ot    Dispo likely new snf.       1/2:  Aecopd, hypercapnia, home o2 3L, uses nocturnal bipap, flu+... last blood gas still retaining... cont nocturnal bipap, iv steroids, pulm recs, will recheck blood gas now and may need daytime as well.     Metabolic encephalopathy... related to hypercapnia, acute illness, hypoNa, likely underling dementia... more awake but confused, unclear baseline but she admits to medication and bipap non compliance. Says she lives at home alone.     R pleural effusion, moderate... thoracentesis, needs family for consent.     Uncontrolled hypothyroidism... likely med non compliance... resume home regimen, monitor OP.     ESRD... HD today.   HypoNa.... correct in HD, monitor.   HyperK... HD today, monitor.     Hx afib... home regimen with amio, bb.   Hx CVA/CAD/PVD... asa, plavix, statin.     DM, A1c 8.8%... lantus 10, ss.     Deconditioning... pt/ot    Home regimen for chronic conditions.   Dvt px with heparin.     Dispo is most likely snf.   Palliative consult may be appropriate.         1/1:    - pt with allergy to Tamiflu, therefore did not start  - supportive therapy    Hypercapnic respiratory failure  - has a hx of COPD, with baseline oxygen requirement of 3 LPM, however, recently had been requiring 5 LPM.   - found down at home for uncertain amt of time, possibly 16+ hrs off oxygen  - PaCO2 was improving at [facility], however, upon arrival to [facility] CO2 has increased.   - Continue BiPAP, will repeat ABG in a few hrs to check for improvement  - solumedrol for COPD exacerbation  - albuterol MDI, have not ordered duonebs due to Influenza.     Atrial fibrillation  - telemetry  - Amiodarone  - continue metoprolol for rate  control  - does not appear to be on AC    Hypothyroidism  - continue synthroid, check TSH    PVD  - Plavix    DM  - continue Lantus, ISS  - currently pt is hypoglycemic; hypoglycemia protocol  - will start D10 if needed, however, need to limit volume due to HD    ESRD on HD  - T, Th, Sat  - nephrology consult

## 2025-01-03 NOTE — PROGRESS NOTES
Physical Therapy    Physical Therapy Evaluation & Treatment    Patient Name: Daphne Morris  MRN: [id number]  Department: [place] [address]  Room: [address] [place] [address]  Today's Date: 1/3/2025   Time Calculation  Start Time: 0934  Stop Time: 1012  Time Calculation (min): 38 min    Assessment/Plan   PT Assessment  PT Assessment Results: Decreased strength, Decreased endurance, Impaired balance, Decreased mobility  Rehab Prognosis: Good  Barriers to Discharge Home: Caregiver assistance, Physical needs  Caregiver Assistance: Patient lives alone and/or does not have reliable caregiver assistance  Physical Needs: Stair navigation into home limited by function/safety, Intermittent mobility assistance needed, High falls risk due to function or environment  Evaluation/Treatment Tolerance: Patient limited by fatigue  Medical Staff Made Aware: Yes  Strengths: Ability to acquire knowledge, Housing layout, Support of extended family/friends  Barriers to Participation: Comorbidities  End of Session Communication: Bedside nurse, Care Coordinator  Assessment Comment: 69 year-old F presents with impaired activity tolerance, weakness, inability to negotiate stairs, decreased ambulation and transfers, and unsteadiness; can benefit from skilled PT intervention to assist with discharge planning and address the aforementioned issues to enable her to return to her prior level of function, which was IND transfers and household ambulation with RW/Rolator.  End of Session Patient Position: Bed, 3 rail up, Alarm on (needs in reach)   IP OR SWING BED PT PLAN  Inpatient or Swing Bed: Inpatient  PT Plan  Treatment/Interventions: Bed mobility, Transfer training, Gait training, Balance training, Neuromuscular re-education, Strengthening, Endurance training, Therapeutic exercise, Therapeutic activity  PT Plan: Ongoing PT  PT Frequency: 3 times per week  PT Discharge Recommendations: Moderate intensity level of continued care  PT Recommended Transfer Status: Assist x1,  Assistive device (walker)  PT - OK to Discharge: Yes (per PT POC)      Subjective     General Visit Information:  General  Reason for Referral: 70 y/o F found on floor (possibly for 16 hours); found to have Influenza A and admitted with acute hypercapneic respiratory failure, hyponatremia, metabolic encephalopathy d/t the aforemntioned as well as hypercapnea.  Referred By: Pancho Rojas MD  Past Medical History Relevant to Rehab: ESRD, dialysis dependent for about a year. Tues thurs sat schedule  Hypertension  CAD/PVD  Renal cell cancer  Hyperlipidemia  Diabetes  Atrial fib  COPD, on 3 liters, mostly at night  Hypothyroid  Depression  Visual impairment  Anemia  CVA, multiple. Right sided weakness  Family/Caregiver Present: No  Co-Treatment: OT  Co-Treatment Reason: Maximize patient safety and participation with functional mobility  Prior to Session Communication: Bedside nurse  Patient Position Received: Bed, 3 rail up, Alarm on  Preferred Learning Style: verbal  General Comment: Patient pleasant, cooperative and agreeable to therapy assessment  Home Living:  Home Living  Type of Home: Mobile home  Lives With: Alone  Home Adaptive Equipment:  (Rolator)  Home Layout: One level  Home Access: Stairs to enter with rails  Entrance Stairs-Rails: Right  Entrance Stairs-Number of Steps: 4  Bathroom Shower/Tub:  (sponge bathes)  Prior Level of Function:  Prior Function Per Pt/Caregiver Report  Level of [place]: Independent with ADLs and functional transfers, Independent with homemaking with ambulation  Receives Help From: Friends, Neighbor (nephew)  ADL Assistance: Independent  Homemaking Assistance: Independent  Ambulatory Assistance: Independent (with rolator)  Prior Function Comments: Pt does not drive; nephew drives her dialysis; friends take her to other appointments and the store.  Precautions:  Precautions  Hearing/Visual Limitations: [place]  Medical Precautions: Fall precautions, Oxygen therapy device and  L/min (3L/min via NC)         Objective   Pain:  Pain Assessment  Pain Assessment: 0-10  0-10 (Numeric) Pain Score: 0 - No pain  Cognition:  Cognition  Overall Cognitive Status: Within Functional Limits  Orientation Level: Oriented X4  Attention: Within Functional Limits  Memory: Within Funtional Limits  Insight: Mild  Impulsive: Within functional limits  Processing Speed: Within funtional limits    General Assessments:  General Observation  General Observation: Sickly elderly female with decreased activity tolerance; fearful of going into bathroom.     Activity Tolerance  Endurance: Tolerates 10 - 20 min exercise with multiple rests  Early Mobility/Exercise Safety Screen: Proceed with mobilization - No exclusion criteria met  Activity Tolerance Comments: fair    Coordination  Movements are Fluid and Coordinated: Yes    Postural Control  Postural Control: Within Functional Limits  Posture Comment: age appropriate    Static Sitting Balance  Static Sitting-Balance Support: No upper extremity supported, Feet supported  Static Sitting-Level of Assistance: Independent  Static Sitting-Comment/Number of Minutes: on EOB  Dynamic Sitting Balance  Dynamic Sitting-Balance Support: No upper extremity supported, Feet supported  Dynamic Sitting-Level of Assistance: Distant supervision  Dynamic Sitting-Balance: Forward lean  Dynamic Sitting-Comments: on EOB    Static Standing Balance  Static Standing-Balance Support: Bilateral upper extremity supported  Static Standing-Level of Assistance: Close supervision  Static Standing-Comment/Number of Minutes: with RW  Dynamic Standing Balance  Dynamic Standing-Balance Support: Bilateral upper extremity supported  Dynamic Standing-Level of Assistance: Close supervision  Dynamic Standing-Balance: Turning  Dynamic Standing-Comments: with RW  Functional Assessments:  Bed Mobility  Bed Mobility: Yes  Bed Mobility 1  Bed Mobility 1: Supine to sitting  Level of Assistance 1: Minimum  assistance  Bed Mobility Comments 1: with HOB raised  Bed Mobility 2  Bed Mobility  2: Scooting  Level of Assistance 2: Minimum assistance  Bed Mobility Comments 2: seated on EOB  Bed Mobility 3  Bed Mobility 3: Sitting to supine  Level of Assistance 3: Contact guard    Transfers  Transfer: Yes  Transfer 1  Transfer From 1: Bed to  Transfer to 1: Stand, Sit  Technique 1: Sit to stand, Stand to sit  Transfer Device 1: Walker  Transfer Level of Assistance 1: Contact guard  Trials/Comments 1: 1 trial  Transfers 2  Transfer From 2: Bed to  Transfer to 2: Commode-standard  Technique 2: To right (taking steps)  Transfer Device 2: Walker  Transfer Level of Assistance 2: Close supervision  Trials/Comments 2: 2 trials  Transfers 3  Transfer From 3: Commode-standard to  Transfer to 3: Sit, Stand  Technique 3: Sit to stand, Stand to sit  Transfer Device 3: Walker  Transfer Level of Assistance 3: Close supervision  Trials/Comments 3: 1 trial    Ambulation/Gait Training  Ambulation/Gait Training Performed: Yes  Ambulation/Gait Training 1  Surface 1: Level tile  Device 1: Rolling walker  Assistance 1: Close supervision, Minimal verbal cues  Quality of Gait 1: Decreased step length, Forward flexed posture  Comments/Distance (ft) 1: 3'x2 bed<->BSC  Extremity/Trunk Assessments:  RUE   RUE : Within Functional Limits  LUE   LUE: Within Functional Limits  RLE   RLE : Exceptions to WFL  Strength RLE  RLE Overall Strength: Greater than or equal to 3/5 as evidenced by functional mobility  LLE   LLE : Exceptions to WFL  Strength LLE  LLE Overall Strength: Greater than or equal to 3/5 as evidenced by functional mobility  Treatments:  Therapeutic Activity  Therapeutic Activity Performed: Yes  Therapeutic Activity 1: Pt provided instruction in safe sit<->stand technique to enable her to move in/out of bed/chair safely; pt required minimal verbal cues for proper hand placements and to scoot to edge of sitting surface to facilitate ease of  sit->stand, and to line up to and reach back for sitting surface before sitting.     Pt also provided gait training with RW to enable her to safely ambulate household distances; required minimal verbal cues for walker management, proper sequencing, and safety during bed<->BSC transfers.  Pt declined to walk any further.   Outcome Measures:  [facility] Basic Mobility  Turning from your back to your side while in a flat bed without using bedrails: A little  Moving from lying on your back to sitting on the side of a flat bed without using bedrails: A little  Moving to and from bed to chair (including a wheelchair): A little  Standing up from a chair using your arms (e.g. wheelchair or bedside chair): A little  To walk in hospital room: A lot  Climbing 3-5 steps with railing: Total  Basic Mobility - Total Score: 15    Encounter Problems       Encounter Problems (Active)       Balance       STG - PT independently maintains dynamic standing balance with upper extremity support on RW during walking, as well as with reaching activities.       Start:  01/03/25    Expected End:  01/17/25       INTERVENTIONS:  1. Practice standing with minimal support.  2. Educate patient about standing tolerance.  3. Educate patient about independence with gait, transfers, and ADL's.  4. Educate patient about use of assistive device.  5. Educate patient about self-directed care.         STG - Maintains static standing balance with upper extremity support on RW for >3 minutes independently.        Start:  01/03/25    Expected End:  01/17/25       INTERVENTIONS:  1. Practice standing with minimal support.  2. Educate patient about standing tolerance.  3. Educate patient about independence with gait, transfers, and ADL's.  4. Educate patient about use of assistive device.  5. Educate patient about self-directed care.            Mobility       STG - Patient will ambulate >50' on level surfaces with RW independently.        Start:  01/03/25    Expected  End:  01/17/25               PT Transfers       STG - Patient to transfer to and from sit to supine independently from a flat bed.        Start:  01/03/25    Expected End:  01/17/25            STG - Patient will transfer sit to and from stand independently with RW.       Start:  01/03/25    Expected End:  01/17/25            Goal 1- LE strength        Start:  01/03/25    Expected End:  01/17/25       Pt will improve LE strength to >/= 4/5 to enable achievement of gait and transfer goals.            Pain - Adult              Education Documentation  Body Mechanics, taught by Ruma Barry PT at 1/3/2025 11:38 AM.  Learner: Patient  Readiness: Acceptance  Method: Explanation  Response: Verbalizes Understanding, Demonstrated Understanding    Mobility Training, taught by Ruma Barry PT at 1/3/2025 11:38 AM.  Learner: Patient  Readiness: Acceptance  Method: Explanation  Response: Verbalizes Understanding, Demonstrated Understanding    Education Comments  No comments found.

## 2025-01-03 NOTE — PROGRESS NOTES
Occupational Therapy    Evaluation/Treatment    Patient Name: Daphne Morris  MRN: [id number]  Today's Date: 1/3/2025  Room: [address] [place] [address]  Time Calculation  Start Time: 0916  Stop Time: 1009  Time Calculation (min): 53 min    Assessment  IP OT Assessment  OT Assessment: OT eval completed, pt admitted after fall, found down. Currently below baseline for ADLs and transfers. Pt with decreased strength, balance, activity tolerance. Pt requiring increased assist with ADLs and mobility. At this time pt would benefit from continued therapy to return to baseline. Recommend mod intensity therapies.  Prognosis: Good  Barriers to Discharge Home: Physical needs  Physical Needs: 24hr mobility assistance needed, High falls risk due to function or environment, Intermittent ADL assistance needed  Evaluation/Treatment Tolerance: Patient tolerated treatment well  Medical Staff Made Aware: Yes  End of Session Communication: Bedside nurse  End of Session Patient Position: Bed, 3 rail up, Alarm on  Plan:  Inpatient Plan  Treatment Interventions: ADL retraining, Functional transfer training, UE strengthening/ROM, Endurance training, Equipment evaluation/education  OT Frequency: 3 times per week  OT Discharge Recommendations: Moderate intensity level of continued care  OT Recommended Transfer Status: Minimal assist  OT - OK to Discharge: Yes (OT POC established this date)  OT Assessment  OT Assessment Results: Decreased ADL status, Decreased endurance, Decreased functional mobility, Decreased upper extremity strength, Decreased IADLs  Prognosis: Good  Barriers to Discharge: Decreased caregiver support  Evaluation/Treatment Tolerance: Patient tolerated treatment well  Medical Staff Made Aware: Yes  Strengths: Capable of completing ADLs semi/independent, Premorbid level of function  Barriers to Participation: Comorbidities    Subjective   Current Problem:  1. Influenza          General:  Reason for Referral: fall, found down, respiratory  failure  Referred By: Ricki Rojas  Past Medical History Relevant to Rehab: ESRD, dialysis dependent for about a year. Tues thurs sat schedule  Hypertension  CAD/PVD  Renal cell cancer  Hyperlipidemia  Diabetes  Atrial fib  COPD, on 3 liters, mostly at night  Hypothyroid  Depression  Visual impairment  Anemia  CVA, multiple. Right sided weakness  Co-Treatment: PT  Co-Treatment Reason: PT joining, for maximizing pt participation and safety  Prior to Session Communication: Bedside nurse  Patient Position Received: Bed, 3 rail up, Alarm on  Family/Caregiver Present: No  General Comment: pt supine in bed upon entry, agreeable to therapy   Precautions:  Hearing/Visual Limitations: Council  Medical Precautions: Fall precautions, Oxygen therapy device and L/min (3L O2)      Pain:  Pain Assessment  Pain Assessment: 0-10  0-10 (Numeric) Pain Score: 0 - No pain      Objective   Cognition:  Overall Cognitive Status: Within Functional Limits  Orientation Level: Oriented X4  Attention: Within Functional Limits  Memory: Within Funtional Limits  Safety/Judgement: Exceptions to WFL  Insight: Mild  Impulsive: Within functional limits           Home Living:  Type of Home: Mobile home  Lives With: Alone  Home Adaptive Equipment: Walker rolling or standard  Home Layout: One level  Home Access: Stairs to enter with rails  Entrance Stairs-Rails: Right  Entrance Stairs-Number of Steps: 4  Bathroom Shower/Tub: Walk-in shower, Tub/shower unit  Bathroom Toilet: Standard  Bathroom Equipment: Grab bars in shower, Shower chair without back   Prior Function:  Level of [place]: Independent with ADLs and functional transfers, Independent with homemaking with ambulation  Receives Help From: Neighbor  ADL Assistance: Independent  Homemaking Assistance: Independent  Ambulatory Assistance: Independent  Prior Function Comments: pt reports using rollator at baseline, nephew assists with transportation to/from dialysis  IADL History:  Homemaking  Responsibilities: Yes  IADL Comments: neighbor can assist with IADLs  ADL:  Eating Assistance: Independent  Grooming Assistance: Stand by  Grooming Deficit: Setup (completed in sitting)  UE Dressing Assistance: Stand by  UE Dressing Deficit:  (don/doff gown sitting EOB)  LE Dressing Assistance: Moderate  LE Dressing Deficit:  (pt able to don bilateral socks sitting EOB, required assist to thread RLE and LLE into underwear due to high height of commode and decreased sitting balance due to lack of foot support. assist to pull underwear up over waist in back)  Toileting Assistance with Device: Stand by  Toileting Deficit: Clothing management up, Bedside commode  ADL Comments: increased time needed for all tasks, VC for hand placement with sit<>stand and [place] [facility]  Activity Tolerance:  Endurance: Tolerates 10 - 20 min exercise with multiple rests  Balance:  Dynamic Sitting Balance  Dynamic Sitting-Balance Support: Feet unsupported  Dynamic Sitting-Level of Assistance: Contact guard  Dynamic Standing Balance  Dynamic Standing-Balance Support: No upper extremity supported  Dynamic Standing-Level of Assistance: Contact guard  Dynamic Standing-Balance:  (pulling underwear up around waist)  Static Sitting Balance  Static Sitting-Balance Support: Feet supported  Static Sitting-Level of Assistance: Distant supervision  Static Standing Balance  Static Standing-Balance Support: Bilateral upper extremity supported  Static Standing-Level of Assistance: Close supervision  Bed Mobility/Transfers: Bed Mobility  Bed Mobility: Yes  Bed Mobility 1  Bed Mobility 1: Supine to sitting  Level of Assistance 1: Minimum assistance  Bed Mobility Comments 1: assist with trunk elevation  Bed Mobility 2  Bed Mobility  2: Scooting  Level of Assistance 2: Minimum assistance  Bed Mobility Comments 2: seated scoot towards EOB  Bed Mobility 3  Bed Mobility 3: Sitting to supine  Level of Assistance 3: Contact guard  Bed Mobility Comments 3:  CGA for BLE  Functional Mobility  Functional Mobility Performed: Yes  Functional Mobility 1  Surface 1: Level tile  Device 1: Rolling walker  Assistance 1: Contact guard  Comments 1: initially CGA progressing to SBA with use of FWW. VC for sequencing to complete bed<>BSC transfers   and Transfers  Transfer: Yes  Transfer 1  Transfer From 1: Sit to  Transfer to 1: Stand  Technique 1: Sit to stand, Stand to sit  Transfer Device 1: Walker  Transfer Level of Assistance 1: Contact guard  Trials/Comments 1: from EOB  Transfers 2  Transfer From 2: Bed to  Transfer to 2: Commode-standard  Technique 2: Lateral, To right  Transfer Device 2: Walker  Transfer Level of Assistance 2: Close supervision  Trials/Comments 2: VC for hand placement  Transfers 3  Transfer From 3: Commode-standard to  Transfer to 3: Stand  Technique 3: Sit to stand, Stand to sit  Transfer Device 3: Walker  Transfer Level of Assistance 3: Close supervision  Trials/Comments 3: VC for hand placement  IADL's:   Homemaking Responsibilities: Yes  IADL Comments: neighbor can assist with IADLs  Vision: Vision - Basic Assessment  Current Vision: Wears glasses only for reading  Patient Visual Report: Diplopia (right eye; black spot in vision)    Sensation:  Light Touch: No apparent deficits  Strength:  Strength Comments: BUE grossly WFL       Coordination:  Movements are Fluid and Coordinated: Yes   Hand Function:  Hand Function  Gross Grasp: Functional  Coordination: Functional  Extremities: RUE   RUE : Within Functional Limits, LUE   LUE: Within Functional Limits,  , and      Outcome Measures: [facility] Daily Activity  Putting on and taking off regular lower body clothing: A lot  Bathing (including washing, rinsing, drying): A little  Putting on and taking off regular upper body clothing: A little  Toileting, which includes using toilet, bedpan or urinal: A little  Taking care of personal grooming such as brushing teeth: A little  Eating Meals: None  Daily Activity  - Total Score: 18         ,          Education Documentation  Body Mechanics, taught by Nina Werner OT at 1/3/2025 12:34 PM.  Learner: Patient  Readiness: Acceptance  Method: Explanation  Response: Verbalizes Understanding  Comment: safety with OOB mobility and ADLs, sequencing for transfers    Precautions, taught by Nina Werner OT at 1/3/2025 12:34 PM.  Learner: Patient  Readiness: Acceptance  Method: Explanation  Response: Verbalizes Understanding  Comment: safety with OOB mobility and ADLs, sequencing for transfers    ADL Training, taught by Nina Werner OT at 1/3/2025 12:34 PM.  Learner: Patient  Readiness: Acceptance  Method: Explanation  Response: Verbalizes Understanding  Comment: safety with OOB mobility and ADLs, sequencing for transfers    Education Comments  No comments found.        Goals:     Encounter Problems       Encounter Problems (Active)       ADLs       Patient will perform UB and LB bathing  with modified independent level of assistance and shower chair.       Start:  01/03/25    Expected End:  01/17/25            Patient with complete lower body dressing with modified independent level of assistance donning and doffing all LE clothes  with no adaptive equipment while supported sitting       Start:  01/03/25    Expected End:  01/17/25            Patient will complete daily grooming tasks brushing teeth and washing face/hair with modified independent level of assistance and PRN adaptive equipment while standing.       Start:  01/03/25    Expected End:  01/17/25            Patient will complete toileting including hygiene clothing management/hygiene with modified independent level of assistance and grab bars.       Start:  01/03/25    Expected End:  01/17/25               MOBILITY       Patient will perform Functional mobility min Household distances/Community Distances with modified independent level of assistance and least restrictive device in order to improve safety and  functional mobility.       Start:  01/03/25    Expected End:  01/17/25               TRANSFERS       Patient will complete functional transfer to with least restrictive device with modified independent level of assistance.       Start:  01/03/25    Expected End:  01/17/25 01/03/25 at 12:36 PM   JOEY BETTS, OT   Rehab Office: [phone]

## 2025-01-03 NOTE — PROGRESS NOTES
"Daphne Morirs is a 69 y.o. female on day 3 of admission presenting with Influenza.    Subjective   Feels \"good\".  Denies shortness of breath, pain or cough, although she states that she did cough after drinking water.  On 3 L nasal cannula oxygen with an oxygen saturation of 97%.  Per respiratory therapy, the patient used BiPAP (18/8 with a rate of 18 and 40% FiO2) for about 2 hours last night, then refused further use of BiPAP.  Right thoracentesis was deferred yesterday.  Arterial blood gas on 36% FiO2 yesterday showed a pH of 7.24, pCO2 69, pO2 72, oxygen saturation 96% and a bicarbonate of 30.     Objective   Physical Exam  Vitals  Blood pressure 122/60, pulse 66, temperature 36.5 °C (97.7 °F), temperature source Axillary, resp. rate 18, weight 70.7 kg (155 lb 13.8 oz), SpO2 93%.  Intake/Output last 3 Shifts:  I/O last 3 completed shifts:  In: 1890 (26.7 mL/kg) [P.O.:490; I.V.:800 (11.3 mL/kg); Other:600]  Out: 2700 (38.2 mL/kg) [Urine:100 (0 mL/kg/hr); Other:2600]  Weight: 70.7 kg     General-awake, alert and in no acute distress, but confused.  On 3 L nasal cannula oxygen.  Lungs-clear, without wheezes, rales or rhonchi.  Heart-regular rate and rhythm versus irregularly irregular.  Abdomen-positive bowel sounds.  Extremities-no edema.  Skin-no rashes.    Scheduled medications  amiodarone, 200 mg, oral, Daily with breakfast  amLODIPine, 5 mg, oral, Daily  aspirin, 81 mg, oral, Daily  atorvastatin, 40 mg, oral, Daily  clopidogrel, 75 mg, oral, Daily  ferrous gluconate, 324 mg, oral, Daily with breakfast  heparin (porcine), 5,000 Units, subcutaneous, q8h SUMIT  insulin glargine, 10 Units, subcutaneous, Nightly  insulin lispro, 0-10 Units, subcutaneous, Before meals & nightly  ipratropium-albuteroL, 3 mL, nebulization, TID  levothyroxine, 250 mcg, oral, Daily before breakfast  methylPREDNISolone sodium succinate (PF), 40 mg, intravenous, q8h  metoprolol succinate XL, 50 mg, oral, BID  oxygen, , inhalation, " Continuous - Inhalation  oxygen, , inhalation, Continuous - Inhalation  pantoprazole, 40 mg, oral, BID  venlafaxine XR, 150 mg, oral, Daily      Continuous medications     PRN medications  PRN medications: acetaminophen **OR** acetaminophen **OR** acetaminophen, dextrose, dextrose, glucagon, glucagon, ipratropium-albuteroL, ondansetron ODT **OR** ondansetron, oxygen     Results for orders placed or performed during the hospital encounter of 12/31/24 (from the past 24 hours)   POCT GLUCOSE   Result Value Ref Range    POCT Glucose 113 (H) 74 - 99 mg/dL   POCT GLUCOSE   Result Value Ref Range    POCT Glucose 158 (H) 74 - 99 mg/dL   Blood Gas Arterial Full Panel   Result Value Ref Range    POCT pH, Arterial 7.24 (LL) 7.38 - 7.42 pH    POCT pCO2, Arterial 69 (H) 38 - 42 mm Hg    POCT pO2, Arterial 72 (L) 85 - 95 mm Hg    POCT SO2, Arterial 96 94 - 100 %    POCT Oxy Hemoglobin, Arterial 93.4 (L) 94.0 - 98.0 %    POCT Hematocrit Calculated, Arterial 33.0 (L) 36.0 - 46.0 %    POCT Sodium, Arterial 129 (L) 136 - 145 mmol/L    POCT Potassium, Arterial 4.5 3.5 - 5.3 mmol/L    POCT Chloride, Arterial 93 (L) 98 - 107 mmol/L    POCT Ionized Calcium, Arterial 1.07 (L) 1.10 - 1.33 mmol/L    POCT Glucose, Arterial 150 (H) 74 - 99 mg/dL    POCT Lactate, Arterial 0.4 0.4 - 2.0 mmol/L    POCT Base Excess, Arterial 0.9 -2.0 - 3.0 mmol/L    POCT HCO3 Calculated, Arterial 29.6 (H) 22.0 - 26.0 mmol/L    POCT Hemoglobin, Arterial 11.0 (L) 12.0 - 16.0 g/dL    POCT Anion Gap, Arterial 11 10 - 25 mmo/L    Patient Temperature 37.0 degrees Celsius    FiO2 36 %    Site of Arterial Puncture Radial Left    POCT GLUCOSE   Result Value Ref Range    POCT Glucose 112 (H) 74 - 99 mg/dL   POCT GLUCOSE   Result Value Ref Range    POCT Glucose 138 (H) 74 - 99 mg/dL   CBC   Result Value Ref Range    WBC 7.3 4.4 - 11.3 x10*3/uL    nRBC 0.0 0.0 - 0.0 /100 WBCs    RBC 3.68 (L) 4.00 - 5.20 x10*6/uL    Hemoglobin 11.4 (L) 12.0 - 16.0 g/dL    Hematocrit 37.9 36.0  - 46.0 %     (H) 80 - 100 fL    MCH 31.0 26.0 - 34.0 pg    MCHC 30.1 (L) 32.0 - 36.0 g/dL    RDW 13.7 11.5 - 14.5 %    Platelets 202 150 - 450 x10*3/uL     *Note: Due to a large number of results and/or encounters for the requested time period, some results have not been displayed. A complete set of results can be found in Results Review.      Assessment:  Patient with influenza A infection, persistent right pleural effusion with right lower lobe compressive atelectasis, fluid overload and underlying COPD, with a mild COPD exacerbation, leading to acute-on-chronic hypercapnic and hypoxic respiratory failure.  The cause of the patient's right pleural effusion is uncertain: Pneumonia with a parapneumonic effusion, congestive heart failure/pulmonary edema and/or occult malignancy, cannot be excluded.  There is no bronchospasm presently.  Hypothyroidism, atrial fibrillation, deconditioning and metabolic encephalopathy.    Recommend:  1.  Continue DuoNeb, Synthroid and subcutaneous heparin.  2.  Decrease Solu-Medrol to 20 mg IV every 8 hours.  3.  Keep oxygen saturation approximately 92 to 95% from 7 AM to 10 PM; BiPAP from 10 PM to 7 AM for sleep apnea, if the patient cooperates.  4.  Incentive spirometry and Acapella treatment, if the patient is able to cooperate.  5.  Ultrasound-guided right thoracentesis today.  6.  Follow-up with pulmonary/sleep medicine after discharge.    Lewis Yeung MD

## 2025-01-03 NOTE — PROGRESS NOTES
Daphne Morris is a 69 y.o. female on day 3 of admission presenting with Influenza.      Subjective   Awake, answering questions, lethargic, afebrile, used bipap overnight, presently on 3L NC. No overnight events reported.        Objective     Last Recorded Vitals  /60 (BP Location: Left arm, Patient Position: Lying)   Pulse 66   Temp 36.5 °C (97.7 °F) (Axillary)   Resp 18   Wt 70.7 kg (155 lb 13.8 oz)   SpO2 93%   Intake/Output last 3 Shifts:    Intake/Output Summary (Last 24 hours) at 1/3/2025 1047  Last data filed at 1/3/2025 1026  Gross per 24 hour   Intake 1200 ml   Output 2600 ml   Net -1400 ml       Admission Weight  Weight: 70.7 kg (155 lb 13.8 oz) (01/02/25 0600)    Daily Weight  01/02/25 : 70.7 kg (155 lb 13.8 oz)    Image Results  ECG 12 lead  Sinus rhythm with 1st degree AV block  Left axis deviation  Left bundle branch block  Abnormal ECG  When compared with ECG of 29-DEC-2024 17:49, (unconfirmed)  Sinus rhythm has replaced Wide QRS rhythm  ECG 12 lead  Wide QRS rhythm  Left axis deviation  Left bundle branch block  Abnormal ECG  When compared with ECG of 26-DEC-2024 08:17,  Wide QRS rhythm has replaced Sinus rhythm      Physical Exam  Constitutional:       Appearance: Normal appearance.   Cardiovascular:      Rate and Rhythm: Normal rate and regular rhythm.   Pulmonary:      Effort: Pulmonary effort is normal.      Breath sounds: Rales present.   Abdominal:      General: Abdomen is flat. Bowel sounds are normal.      Palpations: Abdomen is soft.   Musculoskeletal:      Cervical back: Normal range of motion.   Skin:     General: Skin is warm.   Neurological:      General: No focal deficit present.      Mental Status: She is alert.         Relevant Results    Assessment & Plan  Influenza      1/3:  Still hypercapnic, lethargic, acidotic... repeat abg again today and likely needs to go back on bipap during the day as well as night.     R thoracentesis today.     HypoNa... improving.... per  renal in HD.   K normalized.     HD per renal  DM... well controlled.     Pt/ot    Dispo likely new snf.       1/2:  Aecopd, hypercapnia, home o2 3L, uses nocturnal bipap, flu+... last blood gas still retaining... cont nocturnal bipap, iv steroids, pulm recs, will recheck blood gas now and may need daytime as well.     Metabolic encephalopathy... related to hypercapnia, acute illness, hypoNa, likely underling dementia... more awake but confused, unclear baseline but she admits to medication and bipap non compliance. Says she lives at home alone.     R pleural effusion, moderate... thoracentesis, needs family for consent.     Uncontrolled hypothyroidism... likely med non compliance... resume home regimen, monitor OP.     ESRD... HD today.   HypoNa.... correct in HD, monitor.   HyperK... HD today, monitor.     Hx afib... home regimen with amio, bb.   Hx CVA/CAD/PVD... asa, plavix, statin.     DM, A1c 8.8%... lantus 10, ss.     Deconditioning... pt/ot    Home regimen for chronic conditions.   Dvt px with heparin.     Dispo is most likely snf.   Palliative consult may be appropriate.         1/1:    - pt with allergy to Tamiflu, therefore did not start  - supportive therapy    Hypercapnic respiratory failure  - has a hx of COPD, with baseline oxygen requirement of 3 LPM, however, recently had been requiring 5 LPM.   - found down at home for uncertain amt of time, possibly 16+ hrs off oxygen  - PaCO2 was improving at [facility], however, upon arrival to [facility] CO2 has increased.   - Continue BiPAP, will repeat ABG in a few hrs to check for improvement  - solumedrol for COPD exacerbation  - albuterol MDI, have not ordered duonebs due to Influenza.     Atrial fibrillation  - telemetry  - Amiodarone  - continue metoprolol for rate control  - does not appear to be on AC    Hypothyroidism  - continue synthroid, check TSH    PVD  - Plavix    DM  - continue Lantus, ISS  - currently pt is hypoglycemic; hypoglycemia protocol  - will  start D10 if needed, however, need to limit volume due to HD    ESRD on HD  - T, Th, Sat  - nephrology consult                  Ricki Rojas MD

## 2025-01-03 NOTE — PROGRESS NOTES
Daphne Morris is a 69 y.o. female on day 3 of admission presenting with Influenza.      Subjective   Doing better no new complaints, hemodialysis scheduled for tomorrow       Objective        Vitals 24HR  Heart Rate:  [51-70]   Temp:  [36.4 °C (97.5 °F)-36.7 °C (98.1 °F)]   Resp:  [16-20]   BP: (107-144)/(47-73)   SpO2:  [90 %-100 %]     Intake/Output last 3 Shifts:    Intake/Output Summary (Last 24 hours) at 1/3/2025 1445  Last data filed at 1/3/2025 1026  Gross per 24 hour   Intake 200 ml   Output --   Net 200 ml       Physical Exam        Physical Exam  GEN appearance: Ill-appearing lady on 4 L oxygen via nasal cannula  Head and ENT: Normocephalic/atraumatic/supple neck/no JVD  Lungs: Bilateral rhonchi  Heart: RRR  Abdomen; no tenderness organomegaly  Extremities; no edema     Dialysis access= right upper arm AV fistula with positive bruit and thrill           Relevant Results     Results from last 7 days   Lab Units 01/03/25  0616 01/02/25  0500 01/01/25  0058   WBC AUTO x10*3/uL 7.3 6.6 5.6   HEMOGLOBIN g/dL 11.4* 10.5* 10.8*   HEMATOCRIT % 37.9 32.6* 33.9*   PLATELETS AUTO x10*3/uL 202 195 185      Results from last 7 days   Lab Units 01/03/25  0939 01/02/25  0500 01/01/25  0620   SODIUM mmol/L 129* 125* 127*   POTASSIUM mmol/L 5.0 5.7* 4.7   CHLORIDE mmol/L 91* 89* 93*   CO2 mmol/L 29 25 24   BUN mg/dL 40* 67* 62*   CREATININE mg/dL 3.76* 4.78* 4.57*   GLUCOSE mg/dL 120* 149* 139*   CALCIUM mg/dL 7.7* 6.8* 6.4*        Current Facility-Administered Medications:     acetaminophen (Tylenol) tablet 650 mg, 650 mg, oral, q4h PRN, 650 mg at 01/02/25 0026 **OR** acetaminophen (Tylenol) oral liquid 650 mg, 650 mg, oral, q4h PRN **OR** acetaminophen (Tylenol) suppository 650 mg, 650 mg, rectal, q4h PRN, Ghazal Gomez MD    amiodarone (Pacerone) tablet 200 mg, 200 mg, oral, Daily with breakfast, Ghazal Gomez MD, 200 mg at 01/03/25 0837    amLODIPine (Norvasc) tablet 5 mg, 5 mg, oral, Daily, Ghazal Gomez MD, 5 mg  at 01/03/25 0837    aspirin EC tablet 81 mg, 81 mg, oral, Daily, Ghazal Gomez MD, 81 mg at 01/03/25 0837    atorvastatin (Lipitor) tablet 40 mg, 40 mg, oral, Daily, Ghazal Gomez MD, 40 mg at 01/03/25 0837    clopidogrel (Plavix) tablet 75 mg, 75 mg, oral, Daily, Ghazal Gomez MD, 75 mg at 01/03/25 0837    dextrose 50 % injection 12.5 g, 12.5 g, intravenous, q15 min PRN, Ghazal Gomez MD, 12.5 g at 01/01/25 0000    dextrose 50 % injection 25 g, 25 g, intravenous, q15 min PRN, Ghazal Gomez MD, 25 g at 01/01/25 0533    ferrous gluconate (Fergon) 324 (38 Fe) mg tablet 324 mg, 324 mg, oral, Daily with breakfast, Ghazal Gomez MD, 324 mg at 01/03/25 0837    glucagon (Glucagen) injection 1 mg, 1 mg, intramuscular, q15 min PRN, Ghazal Gomez MD    glucagon (Glucagen) injection 1 mg, 1 mg, intramuscular, q15 min PRN, Ghazal Gomez MD    heparin (porcine) injection 5,000 Units, 5,000 Units, subcutaneous, q8h SUMIT, Ghazal Gomez MD, 5,000 Units at 01/03/25 1313    insulin glargine (Lantus) injection 10 Units, 10 Units, subcutaneous, Nightly, Ghazal Gomez MD, 10 Units at 01/02/25 2117    insulin lispro injection 0-10 Units, 0-10 Units, subcutaneous, Before meals & nightly, Ghazal Gomez MD    ipratropium-albuteroL (Duo-Neb) 0.5-2.5 mg/3 mL nebulizer solution 3 mL, 3 mL, nebulization, TID, Ghazal Gomez MD, 3 mL at 01/03/25 1234    ipratropium-albuteroL (Duo-Neb) 0.5-2.5 mg/3 mL nebulizer solution 3 mL, 3 mL, nebulization, q2h PRN, Ghazal Gomez MD    levothyroxine (Synthroid, Levoxyl) tablet 250 mcg, 250 mcg, oral, Daily before breakfast, Ghazal Gomez MD, 250 mcg at 01/03/25 0615    methylPREDNISolone sod succinate (SOLU-Medrol) 40 mg/mL injection 20 mg, 20 mg, intravenous, q8h, Lewis Yeung MD, 20 mg at 01/03/25 1312    metoprolol succinate XL (Toprol-XL) 24 hr tablet 50 mg, 50 mg, oral, BID, Ghazal Gomez MD, 50 mg at 01/03/25 0837    ondansetron ODT (Zofran-ODT) disintegrating tablet 4 mg, 4 mg,  oral, q8h PRN **OR** ondansetron (Zofran) injection 4 mg, 4 mg, intravenous, q8h PRN, Ghazal Gomez MD, 4 mg at 01/02/25 0219    oxygen (O2) therapy, , inhalation, Continuous - Inhalation, Ricki Rojas MD, 40 percent at 01/03/25 1234    oxygen (O2) therapy, , inhalation, Continuous PRN - O2/gases, Lewis Yeung MD    oxygen (O2) therapy, , inhalation, Continuous - Inhalation, Ricki Rojas MD, 3 L/min at 01/03/25 0727    pantoprazole (ProtoNix) EC tablet 40 mg, 40 mg, oral, BID, Ghazal Gomez MD, 40 mg at 01/03/25 0837    venlafaxine XR (Effexor-XR) 24 hr capsule 150 mg, 150 mg, oral, Daily, Ghazal Gomez MD, 150 mg at 01/03/25 0837           Assessment/Plan            1.  ESKD on hemodialysis at [facility] in [place] while via right upper arm AV fistula,  Patient be dialyzed today  2.  Influenza type A currently in isolation  3.  COPD on 3 L oxygen per minute  4.  Atrial fibrillation follow-up with cardiology  5.  Hyponatremia will limit free water intake to 1000 milliliters per 24 hours        Continue all management as baseline follow-up with other consultants on daily basis with BMP and CBC                I spent 35 minutes in the professional and overall care of this patient.      Marleny Patel MD

## 2025-01-03 NOTE — INTERVAL H&P NOTE
H&P reviewed. The patient was examined and there are no changes to the H&P. SOB, Pleural effusion. For US guided thoracentesis today.

## 2025-01-04 ENCOUNTER — APPOINTMENT (OUTPATIENT)
Dept: DIALYSIS | Facility: HOSPITAL | Age: 70
End: 2025-01-04
Payer: MEDICARE

## 2025-01-04 LAB
ANION GAP SERPL CALC-SCNC: 14 MMOL/L (ref 10–20)
BNP SERPL-MCNC: 2585 PG/ML (ref 0–99)
BUN SERPL-MCNC: 52 MG/DL (ref 6–23)
CALCIUM SERPL-MCNC: 7.5 MG/DL (ref 8.6–10.3)
CHLORIDE SERPL-SCNC: 91 MMOL/L (ref 98–107)
CO2 SERPL-SCNC: 26 MMOL/L (ref 21–32)
CREAT SERPL-MCNC: 4.45 MG/DL (ref 0.5–1.05)
EGFRCR SERPLBLD CKD-EPI 2021: 10 ML/MIN/1.73M*2
ERYTHROCYTE [DISTWIDTH] IN BLOOD BY AUTOMATED COUNT: 13.5 % (ref 11.5–14.5)
GLUCOSE BLD MANUAL STRIP-MCNC: 103 MG/DL (ref 74–99)
GLUCOSE BLD MANUAL STRIP-MCNC: 69 MG/DL (ref 74–99)
GLUCOSE BLD MANUAL STRIP-MCNC: 77 MG/DL (ref 74–99)
GLUCOSE BLD MANUAL STRIP-MCNC: 93 MG/DL (ref 74–99)
GLUCOSE SERPL-MCNC: 117 MG/DL (ref 74–99)
HCT VFR BLD AUTO: 37.4 % (ref 36–46)
HGB BLD-MCNC: 11.5 G/DL (ref 12–16)
MCH RBC QN AUTO: 31.2 PG (ref 26–34)
MCHC RBC AUTO-ENTMCNC: 30.7 G/DL (ref 32–36)
MCV RBC AUTO: 101 FL (ref 80–100)
NRBC BLD-RTO: 0 /100 WBCS (ref 0–0)
PLATELET # BLD AUTO: 190 X10*3/UL (ref 150–450)
POTASSIUM SERPL-SCNC: 5.3 MMOL/L (ref 3.5–5.3)
RBC # BLD AUTO: 3.69 X10*6/UL (ref 4–5.2)
SODIUM SERPL-SCNC: 126 MMOL/L (ref 136–145)
WBC # BLD AUTO: 6.5 X10*3/UL (ref 4.4–11.3)

## 2025-01-04 PROCEDURE — 82947 ASSAY GLUCOSE BLOOD QUANT: CPT

## 2025-01-04 PROCEDURE — 2500000001 HC RX 250 WO HCPCS SELF ADMINISTERED DRUGS (ALT 637 FOR MEDICARE OP): Performed by: HOSPITALIST

## 2025-01-04 PROCEDURE — 2500000002 HC RX 250 W HCPCS SELF ADMINISTERED DRUGS (ALT 637 FOR MEDICARE OP, ALT 636 FOR OP/ED): Performed by: HOSPITALIST

## 2025-01-04 PROCEDURE — 94640 AIRWAY INHALATION TREATMENT: CPT

## 2025-01-04 PROCEDURE — 2500000004 HC RX 250 GENERAL PHARMACY W/ HCPCS (ALT 636 FOR OP/ED): Performed by: INTERNAL MEDICINE

## 2025-01-04 PROCEDURE — 8010000001 HC DIALYSIS - HEMODIALYSIS PER DAY

## 2025-01-04 PROCEDURE — 2500000005 HC RX 250 GENERAL PHARMACY W/O HCPCS: Performed by: INTERNAL MEDICINE

## 2025-01-04 PROCEDURE — 80048 BASIC METABOLIC PNL TOTAL CA: CPT | Performed by: INTERNAL MEDICINE

## 2025-01-04 PROCEDURE — 2500000004 HC RX 250 GENERAL PHARMACY W/ HCPCS (ALT 636 FOR OP/ED): Performed by: HOSPITALIST

## 2025-01-04 PROCEDURE — 94660 CPAP INITIATION&MGMT: CPT

## 2025-01-04 PROCEDURE — 36415 COLL VENOUS BLD VENIPUNCTURE: CPT | Performed by: INTERNAL MEDICINE

## 2025-01-04 PROCEDURE — 85027 COMPLETE CBC AUTOMATED: CPT | Performed by: INTERNAL MEDICINE

## 2025-01-04 PROCEDURE — 99233 SBSQ HOSP IP/OBS HIGH 50: CPT | Performed by: INTERNAL MEDICINE

## 2025-01-04 PROCEDURE — 1200000002 HC GENERAL ROOM WITH TELEMETRY DAILY

## 2025-01-04 PROCEDURE — 83880 ASSAY OF NATRIURETIC PEPTIDE: CPT | Performed by: INTERNAL MEDICINE

## 2025-01-04 RX ADMIN — Medication 2 L/MIN: at 20:00

## 2025-01-04 RX ADMIN — AMLODIPINE BESYLATE 5 MG: 5 TABLET ORAL at 08:45

## 2025-01-04 RX ADMIN — HEPARIN SODIUM 5000 UNITS: 5000 INJECTION, SOLUTION INTRAVENOUS; SUBCUTANEOUS at 18:25

## 2025-01-04 RX ADMIN — METHYLPREDNISOLONE SODIUM SUCCINATE 20 MG: 40 INJECTION, POWDER, FOR SOLUTION INTRAMUSCULAR; INTRAVENOUS at 08:43

## 2025-01-04 RX ADMIN — ATORVASTATIN CALCIUM 40 MG: 40 TABLET, FILM COATED ORAL at 08:45

## 2025-01-04 RX ADMIN — METHYLPREDNISOLONE SODIUM SUCCINATE 20 MG: 40 INJECTION, POWDER, FOR SOLUTION INTRAMUSCULAR; INTRAVENOUS at 00:13

## 2025-01-04 RX ADMIN — INSULIN GLARGINE 10 UNITS: 100 INJECTION, SOLUTION SUBCUTANEOUS at 20:05

## 2025-01-04 RX ADMIN — AMIODARONE HYDROCHLORIDE 200 MG: 200 TABLET ORAL at 08:45

## 2025-01-04 RX ADMIN — Medication 3 L/MIN: at 08:49

## 2025-01-04 RX ADMIN — LEVOTHYROXINE SODIUM 250 MCG: 0.12 TABLET ORAL at 06:52

## 2025-01-04 RX ADMIN — METOPROLOL SUCCINATE 50 MG: 50 TABLET, EXTENDED RELEASE ORAL at 08:45

## 2025-01-04 RX ADMIN — CLOPIDOGREL 75 MG: 75 TABLET ORAL at 08:45

## 2025-01-04 RX ADMIN — FERROUS GLUCONATE 324 MG: 324 TABLET ORAL at 08:45

## 2025-01-04 RX ADMIN — IPRATROPIUM BROMIDE AND ALBUTEROL SULFATE 3 ML: 2.5; .5 SOLUTION RESPIRATORY (INHALATION) at 20:08

## 2025-01-04 RX ADMIN — ASPIRIN 81 MG: 81 TABLET, COATED ORAL at 08:45

## 2025-01-04 RX ADMIN — Medication 2 L/MIN: at 20:08

## 2025-01-04 RX ADMIN — PANTOPRAZOLE SODIUM 40 MG: 40 TABLET, DELAYED RELEASE ORAL at 20:08

## 2025-01-04 RX ADMIN — IPRATROPIUM BROMIDE AND ALBUTEROL SULFATE 3 ML: 2.5; .5 SOLUTION RESPIRATORY (INHALATION) at 08:18

## 2025-01-04 RX ADMIN — PANTOPRAZOLE SODIUM 40 MG: 40 TABLET, DELAYED RELEASE ORAL at 08:45

## 2025-01-04 RX ADMIN — METOPROLOL SUCCINATE 50 MG: 50 TABLET, EXTENDED RELEASE ORAL at 20:07

## 2025-01-04 RX ADMIN — Medication 3 L/MIN: at 08:18

## 2025-01-04 RX ADMIN — VENLAFAXINE HYDROCHLORIDE 150 MG: 75 CAPSULE, EXTENDED RELEASE ORAL at 08:45

## 2025-01-04 RX ADMIN — HEPARIN SODIUM 5000 UNITS: 5000 INJECTION, SOLUTION INTRAVENOUS; SUBCUTANEOUS at 21:30

## 2025-01-04 RX ADMIN — HEPARIN SODIUM 5000 UNITS: 5000 INJECTION, SOLUTION INTRAVENOUS; SUBCUTANEOUS at 06:52

## 2025-01-04 ASSESSMENT — PAIN SCALES - GENERAL
PAINLEVEL_OUTOF10: 0 - NO PAIN
PAINLEVEL_OUTOF10: 0 - NO PAIN

## 2025-01-04 ASSESSMENT — COGNITIVE AND FUNCTIONAL STATUS - GENERAL
TOILETING: A LITTLE
PERSONAL GROOMING: A LITTLE
WALKING IN HOSPITAL ROOM: A LOT
DRESSING REGULAR LOWER BODY CLOTHING: A LITTLE
MOVING TO AND FROM BED TO CHAIR: A LITTLE
MOBILITY SCORE: 15
MOVING TO AND FROM BED TO CHAIR: A LITTLE
CLIMB 3 TO 5 STEPS WITH RAILING: A LOT
TOILETING: A LITTLE
MOVING FROM LYING ON BACK TO SITTING ON SIDE OF FLAT BED WITH BEDRAILS: A LITTLE
DAILY ACTIVITIY SCORE: 18
WALKING IN HOSPITAL ROOM: A LOT
DRESSING REGULAR UPPER BODY CLOTHING: A LITTLE
TURNING FROM BACK TO SIDE WHILE IN FLAT BAD: A LITTLE
CLIMB 3 TO 5 STEPS WITH RAILING: A LOT
DAILY ACTIVITIY SCORE: 18
DRESSING REGULAR LOWER BODY CLOTHING: A LITTLE
EATING MEALS: A LITTLE
STANDING UP FROM CHAIR USING ARMS: A LOT
DRESSING REGULAR UPPER BODY CLOTHING: A LITTLE
MOVING FROM LYING ON BACK TO SITTING ON SIDE OF FLAT BED WITH BEDRAILS: A LITTLE
EATING MEALS: A LITTLE
TURNING FROM BACK TO SIDE WHILE IN FLAT BAD: A LITTLE
HELP NEEDED FOR BATHING: A LITTLE
HELP NEEDED FOR BATHING: A LITTLE
PERSONAL GROOMING: A LITTLE
STANDING UP FROM CHAIR USING ARMS: A LOT
MOBILITY SCORE: 15

## 2025-01-04 ASSESSMENT — PAIN - FUNCTIONAL ASSESSMENT: PAIN_FUNCTIONAL_ASSESSMENT: NO/DENIES PAIN

## 2025-01-04 NOTE — PROGRESS NOTES
Daphne Morris is a 69 y.o. female on day 4 of admission presenting with Influenza.      Subjective   Awake, alert. RN reports she only wore her bipap a couple hours overnight. Pt is declining to wear it any more, doesn't like how it feels. I explained the concern regarding her hypercapnia and acidosis but she still declines. Currently on 3L. Afebrile. No overnight events reported. S/p R thora yesterday.        Objective     Last Recorded Vitals  /62 (BP Location: Left leg, Patient Position: Lying)   Pulse 60   Temp 36.8 °C (98.2 °F) (Temporal)   Resp 20   Wt 70.7 kg (155 lb 13.8 oz)   SpO2 100%   Intake/Output last 3 Shifts:    Intake/Output Summary (Last 24 hours) at 1/4/2025 1611  Last data filed at 1/4/2025 1600  Gross per 24 hour   Intake 600 ml   Output --   Net 600 ml       Admission Weight  Weight: 70.7 kg (155 lb 13.8 oz) (01/02/25 0600)    Daily Weight  01/02/25 : 70.7 kg (155 lb 13.8 oz)    Image Results  US thoracentesis  Narrative: Interpreted By:  Danielle Hutson,   STUDY:  US THORACENTESIS; 1/3/820718:51 pm      INDICATION:  Signs/Symptoms:Right pleural effusion.      COMPARISON:  None.      ACCESSION NUMBER(S):  [id number]      ORDERING CLINICIAN:  ANGEL RAMOS      TECHNIQUE:  INTERVENTIONALIST:  Danielle Hutson      CONSENT:  The patient/patient's POA/next of kin was informed of the nature of  the proposed procedure. Risks were discussed including but not  limited to bleeding, infection, pain at insertion site, or  pneumo/hemothorax requiring chest tube placement. Purpose of  treatment and alternative options were also reviewed. All questions  were answered and patient agreed to proceed.      SEDATION:  None      MEDICATION/CONTRAST:  Lidocaine 1%.      TIME OUT:  A time out was performed immediately prior to procedure start with  the interventional team, correctly identifying the patient name, date  of birth, MRN, procedure, anatomy (including marking of site and  side),  patient position, procedure consent form, relevant laboratory  and imaging test results, antibiotic administration, safety  precautions, and procedure-specific equipment needs.      FINDINGS:  The patient was placed in the left lateral decubitus position.      The patient's right pleural space was scanned using the ultrasound  probe. The area of fluid most amenable to drainage was marked. Color  doppler was used to assess for vasculature in the intended field.      The patient's skin was sterilized using chlorhexidine and draped in  sterile manner. The skin was anesthestized with 1% lidocaine.  Under  real time ultrasound guidance, a 5F valved centesis catheter was  inserted into the right pleural space where previously marked. A  total of 1,000 mL of clear yellow pleural fluid was removed. The  catheter was then removed and a sterile op site was placed over the  insertion site. Sterile technique used throughout entirety of  procedure.      Specimens were obtained, labeled and sent to lab with requisitions  ordered by primary team.      The patient tolerated the procedure well and there were no immediate  complications.      Impression: Uneventful thoracentesis, as detailed above: Right Pleural space,  1,000 mL      Performed and dictated at [facility].      Signed by: Danielle Hutson 1/3/2025 12:51 PM  Dictation workstation:   [id number]      Physical Exam  Constitutional:       Appearance: Normal appearance.   Cardiovascular:      Rate and Rhythm: Normal rate and regular rhythm.   Pulmonary:      Effort: Pulmonary effort is normal.      Breath sounds: Rales present.   Abdominal:      General: Abdomen is flat. Bowel sounds are normal.      Palpations: Abdomen is soft.   Musculoskeletal:      Cervical back: Normal range of motion.   Skin:     General: Skin is warm.   Neurological:      General: No focal deficit present.      Mental Status: She is alert.         Relevant  Results    Assessment & Plan  Influenza        1/4:  She continues to decline to wear bipap. At this point will stop checking blood gases and consult palliative. Best course is probably dnr/dni and maybe hospice. Will wean down iv steroids, cont bipap as much as she will agree.     S/p R thora, 1L... f/up fluid studies.     HypoNa... HD today.   DM well controlled.     Pt/ot        1/3:  Still hypercapnic, lethargic, acidotic... repeat abg again today and likely needs to go back on bipap during the day as well as night.     R thoracentesis today.     HypoNa... improving.... per renal in HD.   K normalized.     HD per renal  DM... well controlled.     Pt/ot    Dispo likely new snf.       1/2:  Aecopd, hypercapnia, home o2 3L, uses nocturnal bipap, flu+... last blood gas still retaining... cont nocturnal bipap, iv steroids, pulm recs, will recheck blood gas now and may need daytime as well.     Metabolic encephalopathy... related to hypercapnia, acute illness, hypoNa, likely underling dementia... more awake but confused, unclear baseline but she admits to medication and bipap non compliance. Says she lives at home alone.     R pleural effusion, moderate... thoracentesis, needs family for consent.     Uncontrolled hypothyroidism... likely med non compliance... resume home regimen, monitor OP.     ESRD... HD today.   HypoNa.... correct in HD, monitor.   HyperK... HD today, monitor.     Hx afib... home regimen with amio bb.   Hx CVA/CAD/PVD... asa, plavix, statin.     DM, A1c 8.8%... lantus 10, ss.     Deconditioning... pt/ot    Home regimen for chronic conditions.   Dvt px with heparin.     Dispo is most likely snf.   Palliative consult may be appropriate.         1/1:    - pt with allergy to Tamiflu, therefore did not start  - supportive therapy    Hypercapnic respiratory failure  - has a hx of COPD, with baseline oxygen requirement of 3 LPM, however, recently had been requiring 5 LPM.   - found down at home for  uncertain amt of time, possibly 16+ hrs off oxygen  - PaCO2 was improving at [facility], however, upon arrival to [facility] CO2 has increased.   - Continue BiPAP, will repeat ABG in a few hrs to check for improvement  - solumedrol for COPD exacerbation  - albuterol MDI, have not ordered duonebs due to Influenza.     Atrial fibrillation  - telemetry  - Amiodarone  - continue metoprolol for rate control  - does not appear to be on AC    Hypothyroidism  - continue synthroid, check TSH    PVD  - Plavix    DM  - continue Lantus, ISS  - currently pt is hypoglycemic; hypoglycemia protocol  - will start D10 if needed, however, need to limit volume due to HD    ESRD on HD  - T, Th, Sat  - nephrology consult                  Ricki Rojas MD

## 2025-01-04 NOTE — CONSULTS
Wound Care Consult     Visit Date: 1/3/2025      Patient Name: Daphne Morris         MRN: [id number]           YOB: 1955     Reason for Consult: Right UE, Left LE & bilateral heels           Pertinent Labs:   [place], Urine   Date Value Ref Range Status   07/15/2020 1,714 (H) 0 - 23 MG/L Final     Comment:     RECHECKED DILUTED  Performed at [address] [address]       Albumin   Date Value Ref Range Status   01/03/2025 3.4 3.4 - 5.0 g/dL Final     Albumin, Urine Random   Date Value Ref Range Status   02/01/2024 >2,250.0 Not established mg/L Final       Wound Assessment:  Wound 12/26/24 Pretibial Left;Proximal (Active)   Wound Image   12/26/24 1514   Drainage Amount None 12/28/24 0700   Dressing Foam 01/01/25 0000   Dressing Changed Changed 12/31/24 2230   Dressing Status Clean;Dry 01/02/25 2255       Wound 12/26/24  Heel Dorsal;Right (Active)   Dressing Foam 01/01/25 2030   Dressing Changed New 12/31/24 2230   Dressing Status Clean;Dry 01/02/25 2255       Wound 12/26/24  Heel Dorsal;Left (Active)   Dressing Foam 01/01/25 2030   Dressing Changed New 12/31/24 2230   Dressing Status Clean;Dry 01/02/25 2255       Wound 12/31/24 Arm Dorsal;Lower;Right (Active)   Drainage Description Sanguineous 01/01/25 0000   Dressing Petroleum gauze;Kerlix/rolled gauze 01/02/25 2100   Dressing Changed Changed 01/01/25 2200   Dressing Status Clean;Dry 01/02/25 2255       Wound 12/31/24 Hand Dorsal;Right (Active)   Dressing Changed New 01/01/25 2200   Dressing Status Clean;Dry;Occlusive 01/02/25 2255         Wound Team Summary Assessment:     Right UE- Skin tear   Irrigate with normal saline or wound cleanser, Pat dry.  Apply no sting skin barrier (cavilon) to yue wound   Apply single layer of  xeroform dressing  Pad and protect ABD, kerlix and paper tape.   Change every day and as needed.     Secure dressing with Sleeve     Right hand-trauma  Irrigate with normal saline or wound cleanser, Pat  dry.  Apply no sting skin barrier (cavilon) to yeu wound   Pad and protect with dry dressing   Change every day and as needed.       LLE  Irrigate with normal saline or wound cleanser, Pat dry.  Apply no sting skin barrier (cavilon) to yue wound   Apply single layer of  xeroform dressing  Cover with Mepilex border dressing   Change every day and as needed.       Bilateral heels  Cleanse with bath wipes or soap and water. Rinse well and pat dry.   Apply no sting skin barrier (cavilon) to yue wound   Cover with Mepilex border dressing Heels   Peel back and assess skin every shift and as needed. Change dressing every other day and as needed if dislodged.      While in bed patient should only be on one fitted sheet, and one chux. Please, do not use brief while patient is resting in bed. Elevate heels off the bed surface at all times. Turn and reposition at least every 2 hours.     Wound Team Plan: Thank you for this consult. Assessment has been completed and orders have been placed. Wound care to be completed by nursing per orders. Please place new consult if there is a change in wound status.        Alexandra Lopez RN BSN,[facility],CWOCN  [phone]   1/3/2025  7:23 PM

## 2025-01-04 NOTE — PROGRESS NOTES
01/04/25 1139   Discharge Planning   Expected Discharge Disposition SNF     Once med ready plan is to [facility], PT/OT both rec'd Mod I have requested auth to be started, facility is aware patient has HD and is able to transport patient to HD facility.    12:34 [id number] pending ref number

## 2025-01-04 NOTE — CARE PLAN
Problem: Pain - Adult  Goal: Verbalizes/displays adequate comfort level or baseline comfort level  Outcome: Progressing     Problem: Safety - Adult  Goal: Free from fall injury  Outcome: Progressing     Problem: Discharge Planning  Goal: Discharge to home or other facility with appropriate resources  Outcome: Progressing     Problem: Chronic Conditions and Co-morbidities  Goal: Patient's chronic conditions and co-morbidity symptoms are monitored and maintained or improved  Outcome: Progressing     Problem: Respiratory  Goal: Clear secretions with interventions this shift  Outcome: Progressing  Goal: Minimize anxiety/maximize coping throughout shift  Outcome: Progressing  Goal: Minimal/no exertional discomfort or dyspnea this shift  Outcome: Progressing  Goal: No signs of respiratory distress (eg. Use of accessory muscles. Peds grunting)  Outcome: Progressing  Goal: Patent airway maintained this shift  Outcome: Progressing  Goal: Tolerate mechanical ventilation evidenced by VS/agitation level this shift  Outcome: Progressing  Goal: Tolerate pulmonary toileting this shift  Outcome: Progressing  Goal: Verbalize decreased shortness of breath this shift  Outcome: Progressing  Goal: Wean oxygen to maintain O2 saturation per order/standard this shift  Outcome: Progressing  Goal: Increase self care and/or family involvement in next 24 hours  Outcome: Progressing     Problem: Skin  Goal: Decreased wound size/increased tissue granulation at next dressing change  Outcome: Progressing  Goal: Participates in plan/prevention/treatment measures  Outcome: Progressing  Goal: Prevent/manage excess moisture  Outcome: Progressing  Goal: Prevent/minimize sheer/friction injuries  Outcome: Progressing  Goal: Promote/optimize nutrition  Outcome: Progressing  Goal: Promote skin healing  Outcome: Progressing   The patient's goals for the shift include      The clinical goals for the shift include Patient will remain free of pain and  discomforts until the end of the shift

## 2025-01-04 NOTE — PROGRESS NOTES
Daphne Morris is a 69 y.o. female on day 4 of admission presenting with Influenza.      Subjective   Patient was seen and evaluated this morning, feeling better still in isolation because of her influenza A infection.  Scheduled for hemodialysis today in her room.       Objective        Vitals 24HR  Heart Rate:  [51-60]   Temp:  [36.2 °C (97.2 °F)-36.9 °C (98.4 °F)]   Resp:  [16-20]   BP: (107-146)/(47-84)   SpO2:  [90 %-100 %]     Intake/Output last 3 Shifts:    Intake/Output Summary (Last 24 hours) at 1/4/2025 1054  Last data filed at 1/4/2025 0308  Gross per 24 hour   Intake 200 ml   Output --   Net 200 ml       Physical Exam        GEN appearance: Ill-appearing lady on 4 L oxygen via nasal cannula  Head and ENT: Normocephalic/atraumatic/supple neck/no JVD  Lungs: Bilateral rhonchi  Heart: RRR  Abdomen; no tenderness organomegaly  Extremities; no edema     Dialysis access= right upper arm AV fistula with positive bruit and thrill                Relevant Results     Results from last 7 days   Lab Units 01/04/25  0554 01/03/25  0616 01/02/25  0500   WBC AUTO x10*3/uL 6.5 7.3 6.6   HEMOGLOBIN g/dL 11.5* 11.4* 10.5*   HEMATOCRIT % 37.4 37.9 32.6*   PLATELETS AUTO x10*3/uL 190 202 195      Results from last 7 days   Lab Units 01/04/25  0554 01/03/25  0939 01/02/25  0500   SODIUM mmol/L 126* 129* 125*   POTASSIUM mmol/L 5.3 5.0 5.7*   CHLORIDE mmol/L 91* 91* 89*   CO2 mmol/L 26 29 25   BUN mg/dL 52* 40* 67*   CREATININE mg/dL 4.45* 3.76* 4.78*   GLUCOSE mg/dL 117* 120* 149*   CALCIUM mg/dL 7.5* 7.7* 6.8*        Current Facility-Administered Medications:     acetaminophen (Tylenol) tablet 650 mg, 650 mg, oral, q4h PRN, 650 mg at 01/02/25 0026 **OR** acetaminophen (Tylenol) oral liquid 650 mg, 650 mg, oral, q4h PRN **OR** acetaminophen (Tylenol) suppository 650 mg, 650 mg, rectal, q4h PRN, Ghazal Gomez MD    amiodarone (Pacerone) tablet 200 mg, 200 mg, oral, Daily with breakfast, Ghazal Gomez MD, 200 mg at 01/04/25  0845    amLODIPine (Norvasc) tablet 5 mg, 5 mg, oral, Daily, Ghazal Gomez MD, 5 mg at 01/04/25 0845    aspirin EC tablet 81 mg, 81 mg, oral, Daily, Ghazal Gomez MD, 81 mg at 01/04/25 0845    atorvastatin (Lipitor) tablet 40 mg, 40 mg, oral, Daily, Ghazal Gomez MD, 40 mg at 01/04/25 0845    clopidogrel (Plavix) tablet 75 mg, 75 mg, oral, Daily, Ghazal Gomez MD, 75 mg at 01/04/25 0845    dextrose 50 % injection 12.5 g, 12.5 g, intravenous, q15 min PRN, Ghazal Gomez MD, 12.5 g at 01/01/25 0000    dextrose 50 % injection 25 g, 25 g, intravenous, q15 min PRN, Ghazal Gomez MD, 25 g at 01/01/25 0533    ferrous gluconate (Fergon) 324 (38 Fe) mg tablet 324 mg, 324 mg, oral, Daily with breakfast, Ghazal Gomez MD, 324 mg at 01/04/25 0845    glucagon (Glucagen) injection 1 mg, 1 mg, intramuscular, q15 min PRN, Ghazal Gomez MD    glucagon (Glucagen) injection 1 mg, 1 mg, intramuscular, q15 min PRN, Ghazal Gomez MD    heparin (porcine) injection 5,000 Units, 5,000 Units, subcutaneous, q8h SUMIT, Ghazal Gomez MD, 5,000 Units at 01/04/25 0652    insulin glargine (Lantus) injection 10 Units, 10 Units, subcutaneous, Nightly, Ghazal Gomez MD, 10 Units at 01/03/25 2111    insulin lispro injection 0-10 Units, 0-10 Units, subcutaneous, Before meals & nightly, Ghazal Gomez MD, 2 Units at 01/03/25 2111    ipratropium-albuteroL (Duo-Neb) 0.5-2.5 mg/3 mL nebulizer solution 3 mL, 3 mL, nebulization, TID, Ghazal Gomez MD, 3 mL at 01/04/25 0818    ipratropium-albuteroL (Duo-Neb) 0.5-2.5 mg/3 mL nebulizer solution 3 mL, 3 mL, nebulization, q2h PRN, Ghazal Gomez MD    levothyroxine (Synthroid, Levoxyl) tablet 250 mcg, 250 mcg, oral, Daily before breakfast, Ghazal Gomez MD, 250 mcg at 01/04/25 0652    metoprolol succinate XL (Toprol-XL) 24 hr tablet 50 mg, 50 mg, oral, BID, Ghazal Gomez MD, 50 mg at 01/04/25 0845    ondansetron ODT (Zofran-ODT) disintegrating tablet 4 mg, 4 mg, oral, q8h PRN **OR** ondansetron  (Zofran) injection 4 mg, 4 mg, intravenous, q8h PRN, Ghazal Gomez MD, 4 mg at 01/02/25 0219    oxygen (O2) therapy, , inhalation, Continuous - Inhalation, Ricki Rojas MD, 3 L/min at 01/04/25 0849    oxygen (O2) therapy, , inhalation, Continuous PRN - O2/gases, Lewis Yeung MD    oxygen (O2) therapy, , inhalation, Continuous - Inhalation, Ricki Rojas MD, 3 L/min at 01/04/25 0818    pantoprazole (ProtoNix) EC tablet 40 mg, 40 mg, oral, BID, Ghazal Gomez MD, 40 mg at 01/04/25 0845    venlafaxine XR (Effexor-XR) 24 hr capsule 150 mg, 150 mg, oral, Daily, Ghazal Gomez MD, 150 mg at 01/04/25 0845           Assessment/Plan        1.  ESKD on hemodialysis at [facility] in [place] while via right upper arm AV fistula,  Patient be dialyzed today  2.  Influenza type A currently in isolation  3.  COPD on 3 L oxygen per minute  4.  Atrial fibrillation follow-up with cardiology  5.  Hyponatremia will limit free water intake to 1000 milliliters per 24 hours        Continue all management as baseline follow-up with other consultants on daily basis with BMP and CBC            Assessment & Plan  Influenza              I spent 35 minutes in the professional and overall care of this patient.      Marleny Patel MD

## 2025-01-04 NOTE — PRE-PROCEDURE NOTE
Report from Sending RN:    Report From: Shanti SHERIDAN  Recent Surgery of Procedure: No  Baseline Level of Consciousness (LOC): Aox2  Oxygen Use: Yes  Type: NC  Diabetic: Yes  Last BP Med Given Day of Dialysis: SEE MAR  Last Pain Med Given: SEE MAR  Lab Tests to be Obtained with Dialysis: No  Blood Transfusion to be Given During Dialysis: No  Available IV Access: Yes  Medications to be Administered During Dialysis: No  Continuous IV Infusion Running: No  Restraints on Currently or in the Last 24 Hours: No  Hand-Off Communication: Pt is ready for tx  Dialysis Catheter Dressing: NA  Last Dressing Change: NA

## 2025-01-04 NOTE — DISCHARGE INSTRUCTIONS
Wound care consult/recommendations:       Right UE- Skin tear   Irrigate with normal saline or wound cleanser, Pat dry.  Apply no sting skin barrier (cavilon) to yue wound   Apply single layer of  xeroform dressing  Pad and protect ABD, kerlix and paper tape.   Change every day and as needed.     Secure dressing with Sleeve     Right hand-trauma  Irrigate with normal saline or wound cleanser, Pat dry.  Apply no sting skin barrier (cavilon) to yue wound   Pad and protect with dry dressing   Change every day and as needed.       LLE  Irrigate with normal saline or wound cleanser, Pat dry.  Apply no sting skin barrier (cavilon) to yue wound   Apply single layer of  xeroform dressing  Cover with Mepilex border dressing   Change every day and as needed.       Bilateral heels  Cleanse with bath wipes or soap and water. Rinse well and pat dry.   Apply no sting skin barrier (cavilon) to yue wound   Cover with Mepilex border dressing Heels   Peel back and assess skin every shift and as needed. Change dressing every other day and as needed if dislodged.      While in bed patient should only be on one fitted sheet, and one chux. Please, do not use brief while patient is resting in bed. Elevate heels off the bed surface at all times. Turn and reposition at least every 2 hours.

## 2025-01-04 NOTE — POST-PROCEDURE NOTE
Report to Receiving RN:    Report To: Shanti SHERIDAN  Time Report Called: 1640  Hand-Off Communication: Pt removed 1.9L /65 Hr 65  Complications During Treatment: No  Ultrafiltration Treatment: No  Medications Administered During Dialysis: No  Blood Products Administered During Dialysis: No  Labs Sent During Dialysis: No  Heparin Drip Rate Changes: No  Dialysis Catheter Dressing: NA  Last Dressing Change: NA    Electronic Signatures:   (Signed )   Authored:    (Signed )   Authored:     Last Updated: 4:40 PM by MARK GARCIA

## 2025-01-04 NOTE — ASSESSMENT & PLAN NOTE
1/4:  She continues to decline to wear bipap. At this point will stop checking blood gases and consult palliative. Best course is probably dnr/dni and maybe hospice. Will wean down iv steroids, cont bipap as much as she will agree.     S/p R thora, 1L... f/up fluid studies.     HypoNa... HD today.   DM well controlled.     Pt/ot        1/3:  Still hypercapnic, lethargic, acidotic... repeat abg again today and likely needs to go back on bipap during the day as well as night.     R thoracentesis today.     HypoNa... improving.... per renal in HD.   K normalized.     HD per renal  DM... well controlled.     Pt/ot    Dispo likely new snf.       1/2:  Aecopd, hypercapnia, home o2 3L, uses nocturnal bipap, flu+... last blood gas still retaining... cont nocturnal bipap, iv steroids, pulm recs, will recheck blood gas now and may need daytime as well.     Metabolic encephalopathy... related to hypercapnia, acute illness, hypoNa, likely underling dementia... more awake but confused, unclear baseline but she admits to medication and bipap non compliance. Says she lives at home alone.     R pleural effusion, moderate... thoracentesis, needs family for consent.     Uncontrolled hypothyroidism... likely med non compliance... resume home regimen, monitor OP.     ESRD... HD today.   HypoNa.... correct in HD, monitor.   HyperK... HD today, monitor.     Hx afib... home regimen with amio, bb.   Hx CVA/CAD/PVD... asa, plavix, statin.     DM, A1c 8.8%... lantus 10, ss.     Deconditioning... pt/ot    Home regimen for chronic conditions.   Dvt px with heparin.     Dispo is most likely snf.   Palliative consult may be appropriate.         1/1:    - pt with allergy to Tamiflu, therefore did not start  - supportive therapy    Hypercapnic respiratory failure  - has a hx of COPD, with baseline oxygen requirement of 3 LPM, however, recently had been requiring 5 LPM.   - found down at home for uncertain amt of time, possibly 16+ hrs off  oxygen  - PaCO2 was improving at [facility], however, upon arrival to [facility] CO2 has increased.   - Continue BiPAP, will repeat ABG in a few hrs to check for improvement  - solumedrol for COPD exacerbation  - albuterol MDI, have not ordered duonebs due to Influenza.     Atrial fibrillation  - telemetry  - Amiodarone  - continue metoprolol for rate control  - does not appear to be on AC    Hypothyroidism  - continue synthroid, check TSH    PVD  - Plavix    DM  - continue Lantus, ISS  - currently pt is hypoglycemic; hypoglycemia protocol  - will start D10 if needed, however, need to limit volume due to HD    ESRD on HD  - T, Th, Sat  - nephrology consult

## 2025-01-04 NOTE — CARE PLAN
The patient's goals for the shift include      The clinical goals for the shift include Pt will remain free of injury this shift    Over the shift, the patient did make progress toward the following goals.   Problem: Pain - Adult  Goal: Verbalizes/displays adequate comfort level or baseline comfort level  Outcome: Progressing     Problem: Safety - Adult  Goal: Free from fall injury  Outcome: Progressing     Problem: Discharge Planning  Goal: Discharge to home or other facility with appropriate resources  Outcome: Progressing     Problem: Chronic Conditions and Co-morbidities  Goal: Patient's chronic conditions and co-morbidity symptoms are monitored and maintained or improved  Outcome: Progressing     Problem: Respiratory  Goal: Clear secretions with interventions this shift  Outcome: Progressing  Goal: Minimize anxiety/maximize coping throughout shift  Outcome: Progressing  Goal: Minimal/no exertional discomfort or dyspnea this shift  Outcome: Progressing  Goal: No signs of respiratory distress (eg. Use of accessory muscles. Peds grunting)  Outcome: Progressing  Goal: Patent airway maintained this shift  Outcome: Progressing  Goal: Tolerate mechanical ventilation evidenced by VS/agitation level this shift  Outcome: Progressing  Goal: Tolerate pulmonary toileting this shift  Outcome: Progressing  Goal: Verbalize decreased shortness of breath this shift  Outcome: Progressing  Goal: Wean oxygen to maintain O2 saturation per order/standard this shift  Outcome: Progressing  Goal: Increase self care and/or family involvement in next 24 hours  Outcome: Progressing     Problem: Skin  Goal: Decreased wound size/increased tissue granulation at next dressing change  Outcome: Progressing  Flowsheets (Taken 1/4/2025 0110)  Decreased wound size/increased tissue granulation at next dressing change: Promote sleep for wound healing  Goal: Participates in plan/prevention/treatment measures  Outcome: Progressing  Flowsheets (Taken  1/4/2025 0110)  Participates in plan/prevention/treatment measures: Elevate heels  Goal: Prevent/manage excess moisture  Outcome: Progressing  Flowsheets (Taken 1/4/2025 0110)  Prevent/manage excess moisture: Cleanse incontinence/protect with barrier cream  Goal: Prevent/minimize sheer/friction injuries  Outcome: Progressing  Flowsheets (Taken 1/4/2025 0110)  Prevent/minimize sheer/friction injuries: Use pull sheet  Goal: Promote/optimize nutrition  Outcome: Progressing  Flowsheets (Taken 1/4/2025 0110)  Promote/optimize nutrition: Offer water/supplements/favorite foods  Goal: Promote skin healing  Outcome: Progressing  Flowsheets (Taken 1/4/2025 0110)  Promote skin healing: Turn/reposition every 2 hours/use positioning/transfer devices

## 2025-01-04 NOTE — PROGRESS NOTES
"Daphne Morris is a 69 y.o. female on day 4 of admission presenting with Influenza.    Subjective   Feels \"okay\".  Denies shortness of breath or pain, but does admit to an occasional nonproductive cough.  On 3 L nasal cannula oxygen with an oxygen saturation of 94%.  Refused BiPAP (18/8 with a rate of 18 and 40% FiO2) last night.  Right thoracentesis yesterday showed chemistries consistent with a transudate.  There were 56 white blood cells with 8% polys, 23% lymphocytes and 69% monocytes.  Pleural fluid cultures are negative; cytology is pending.  She states that she wants to get up out of bed.     Objective   Physical Exam    Vitals  Blood pressure 146/59, pulse 60, temperature 36.7 °C (98 °F), temperature source Axillary, resp. rate 19, weight 70.7 kg (155 lb 13.8 oz), SpO2 99%.  Intake/Output last 3 Shifts:  I/O last 3 completed shifts:  In: 400 (5.7 mL/kg) [P.O.:400]  Out: - (0 mL/kg)   Weight: 70.7 kg     General-awake, alert and in no acute distress.  Lungs-bilateral rales without wheezes or rhonchi.  Heart-regular rate and rhythm.  Abdomen-positive bowel sounds.  Extremities-no edema.  Skin-no rashes.    Scheduled medications  amiodarone, 200 mg, oral, Daily with breakfast  amLODIPine, 5 mg, oral, Daily  aspirin, 81 mg, oral, Daily  atorvastatin, 40 mg, oral, Daily  clopidogrel, 75 mg, oral, Daily  ferrous gluconate, 324 mg, oral, Daily with breakfast  heparin (porcine), 5,000 Units, subcutaneous, q8h SUMIT  insulin glargine, 10 Units, subcutaneous, Nightly  insulin lispro, 0-10 Units, subcutaneous, Before meals & nightly  ipratropium-albuteroL, 3 mL, nebulization, TID  levothyroxine, 250 mcg, oral, Daily before breakfast  methylPREDNISolone sodium succinate (PF), 20 mg, intravenous, q12h  metoprolol succinate XL, 50 mg, oral, BID  oxygen, , inhalation, Continuous - Inhalation  oxygen, , inhalation, Continuous - Inhalation  pantoprazole, 40 mg, oral, BID  venlafaxine XR, 150 mg, oral, Daily      Continuous " medications     PRN medications  PRN medications: acetaminophen **OR** acetaminophen **OR** acetaminophen, dextrose, dextrose, glucagon, glucagon, ipratropium-albuteroL, ondansetron ODT **OR** ondansetron, oxygen     Results for orders placed or performed during the hospital encounter of 12/31/24 (from the past 24 hours)   Comprehensive Metabolic Panel   Result Value Ref Range    Glucose 120 (H) 74 - 99 mg/dL    Sodium 129 (L) 136 - 145 mmol/L    Potassium 5.0 3.5 - 5.3 mmol/L    Chloride 91 (L) 98 - 107 mmol/L    Bicarbonate 29 21 - 32 mmol/L    Anion Gap 14 10 - 20 mmol/L    Urea Nitrogen 40 (H) 6 - 23 mg/dL    Creatinine 3.76 (H) 0.50 - 1.05 mg/dL    eGFR 12 (L) >60 mL/min/1.73m*2    Calcium 7.7 (L) 8.6 - 10.3 mg/dL    Albumin 3.4 3.4 - 5.0 g/dL    Alkaline Phosphatase 77 33 - 136 U/L    Total Protein 6.0 (L) 6.4 - 8.2 g/dL    AST 20 9 - 39 U/L    Bilirubin, Total 0.4 0.0 - 1.2 mg/dL    ALT 11 7 - 45 U/L   SST TOP   Result Value Ref Range    Extra Tube Hold for add-ons.    Blood Gas Arterial Full Panel   Result Value Ref Range    POCT pH, Arterial 7.25 (LL) 7.38 - 7.42 pH    POCT pCO2, Arterial 64 (H) 38 - 42 mm Hg    POCT pO2, Arterial 97 (H) 85 - 95 mm Hg    POCT SO2, Arterial 98 94 - 100 %    POCT Oxy Hemoglobin, Arterial 96.6 94.0 - 98.0 %    POCT Hematocrit Calculated, Arterial 33.0 (L) 36.0 - 46.0 %    POCT Sodium, Arterial 125 (L) 136 - 145 mmol/L    POCT Potassium, Arterial 5.1 3.5 - 5.3 mmol/L    POCT Chloride, Arterial 92 (L) 98 - 107 mmol/L    POCT Ionized Calcium, Arterial 1.12 1.10 - 1.33 mmol/L    POCT Glucose, Arterial 150 (H) 74 - 99 mg/dL    POCT Lactate, Arterial 0.4 0.4 - 2.0 mmol/L    POCT Base Excess, Arterial -0.2 -2.0 - 3.0 mmol/L    POCT HCO3 Calculated, Arterial 28.1 (H) 22.0 - 26.0 mmol/L    POCT Hemoglobin, Arterial 10.9 (L) 12.0 - 16.0 g/dL    POCT Anion Gap, Arterial 10 10 - 25 mmo/L    Patient Temperature 37.0 degrees Celsius    FiO2 32 %   Lactate Dehydrogenase, Fluid   Result Value  Ref Range    LD, Fluid 84 Not established. U/L   Protein, Total Fluid   Result Value Ref Range    Protein, Total Fluid 2.2 Not established g/dL   Body Fluid Cell Count   Result Value Ref Range    Color, Fluid Straw Colorless, Straw, Yellow    Clarity, Fluid Clear Clear    WBC, Fluid 56 See Comment /uL    RBC, Fluid 7 0  /uL /uL   Body Fluid Differential   Result Value Ref Range    Neutrophils %, Manual, Fluid 8 <25 % %    Lymphocytes %, Manual, Fluid 23 <75 % %    Mono/Macrophages %, Manual, Fluid 69 <70 % %    Eosinophils %, Manual, Fluid 0 0 % %    Basophils %, Manual, Fluid 0 0 % %    Immature Granulocytes %, Manual, Fluid 0 0 % %    Blasts %, Manual, Fluid 0 0 % %    Unclassified Cells %, Manual, Fluid 0 0 % %    Plasma Cells %, Manual, Fluid 0 0 % %    Total Cells Counted, Fluid 100    Sterile Fluid Culture/Smear    Specimen: Pleural; Fluid   Result Value Ref Range    Sterile Fluid Culture/Smear No growth to date     Gram Stain (2+) Few Polymorphonuclear leukocytes     Gram Stain No organisms seen    POCT GLUCOSE   Result Value Ref Range    POCT Glucose 119 (H) 74 - 99 mg/dL   POCT GLUCOSE   Result Value Ref Range    POCT Glucose 118 (H) 74 - 99 mg/dL   POCT GLUCOSE   Result Value Ref Range    POCT Glucose 191 (H) 74 - 99 mg/dL   Basic Metabolic Panel   Result Value Ref Range    Glucose 117 (H) 74 - 99 mg/dL    Sodium 126 (L) 136 - 145 mmol/L    Potassium 5.3 3.5 - 5.3 mmol/L    Chloride 91 (L) 98 - 107 mmol/L    Bicarbonate 26 21 - 32 mmol/L    Anion Gap 14 10 - 20 mmol/L    Urea Nitrogen 52 (H) 6 - 23 mg/dL    Creatinine 4.45 (H) 0.50 - 1.05 mg/dL    eGFR 10 (L) >60 mL/min/1.73m*2    Calcium 7.5 (L) 8.6 - 10.3 mg/dL   CBC   Result Value Ref Range    WBC 6.5 4.4 - 11.3 x10*3/uL    nRBC 0.0 0.0 - 0.0 /100 WBCs    RBC 3.69 (L) 4.00 - 5.20 x10*6/uL    Hemoglobin 11.5 (L) 12.0 - 16.0 g/dL    Hematocrit 37.4 36.0 - 46.0 %     (H) 80 - 100 fL    MCH 31.2 26.0 - 34.0 pg    MCHC 30.7 (L) 32.0 - 36.0 g/dL    RDW  13.5 11.5 - 14.5 %    Platelets 190 150 - 450 x10*3/uL   POCT GLUCOSE   Result Value Ref Range    POCT Glucose 93 74 - 99 mg/dL     *Note: Due to a large number of results and/or encounters for the requested time period, some results have not been displayed. A complete set of results can be found in Results Review.      Assessment:  Patient with improving Influenza A infection, a transudative right pleural effusion with right lower lobe compressive atelectasis, most likely due to congestive heart failure/fluid overload from renal failure and hypothyroidism, and COPD, but without bronchospasm presently, leading to acute-on-chronic hypercapnic and hypoxemic respiratory failure.  Overall, she appears to be clinically stable from a pulmonary standpoint.  She is refusing BiPAP.  She has hyponatremia    Recommend:  1.  Continue DuoNeb, subcutaneous heparin and Synthroid, but discontinue Solu-Medrol.  2.  Keep oxygen saturation approximately 92 to 95%; home oxygen evaluation prior to discharge; encourage use of BiPAP from 10 PM to 7 AM.  3.  Incentive spirometry and Acapella treatment, if the patient is able to cooperate.  4.  Check pleural fluid cytology--pending.  5.  Dialysis per Renal recommendations..  6.  Follow-up with pulmonary/sleep medicine after discharge.    Lewis Yeung MD

## 2025-01-05 LAB
ANION GAP SERPL CALC-SCNC: 14 MMOL/L (ref 10–20)
BACTERIA FLD CULT: NORMAL
BUN SERPL-MCNC: 32 MG/DL (ref 6–23)
CALCIUM SERPL-MCNC: 7.6 MG/DL (ref 8.6–10.3)
CHLORIDE SERPL-SCNC: 94 MMOL/L (ref 98–107)
CO2 SERPL-SCNC: 27 MMOL/L (ref 21–32)
CREAT SERPL-MCNC: 3.49 MG/DL (ref 0.5–1.05)
EGFRCR SERPLBLD CKD-EPI 2021: 14 ML/MIN/1.73M*2
ERYTHROCYTE [DISTWIDTH] IN BLOOD BY AUTOMATED COUNT: 13.5 % (ref 11.5–14.5)
GLUCOSE BLD MANUAL STRIP-MCNC: 206 MG/DL (ref 74–99)
GLUCOSE BLD MANUAL STRIP-MCNC: 49 MG/DL (ref 74–99)
GLUCOSE BLD MANUAL STRIP-MCNC: 64 MG/DL (ref 74–99)
GLUCOSE BLD MANUAL STRIP-MCNC: 82 MG/DL (ref 74–99)
GLUCOSE BLD MANUAL STRIP-MCNC: 86 MG/DL (ref 74–99)
GLUCOSE BLD MANUAL STRIP-MCNC: 97 MG/DL (ref 74–99)
GLUCOSE SERPL-MCNC: 87 MG/DL (ref 74–99)
GRAM STN SPEC: NORMAL
GRAM STN SPEC: NORMAL
HBV SURFACE AB SER-ACNC: <3.1 MIU/ML
HBV SURFACE AG SERPL QL IA: NONREACTIVE
HCT VFR BLD AUTO: 37.8 % (ref 36–46)
HGB BLD-MCNC: 11.6 G/DL (ref 12–16)
MCH RBC QN AUTO: 31.1 PG (ref 26–34)
MCHC RBC AUTO-ENTMCNC: 30.7 G/DL (ref 32–36)
MCV RBC AUTO: 101 FL (ref 80–100)
NRBC BLD-RTO: 0.3 /100 WBCS (ref 0–0)
PLATELET # BLD AUTO: 178 X10*3/UL (ref 150–450)
POTASSIUM SERPL-SCNC: 4.2 MMOL/L (ref 3.5–5.3)
RBC # BLD AUTO: 3.73 X10*6/UL (ref 4–5.2)
SODIUM SERPL-SCNC: 131 MMOL/L (ref 136–145)
WBC # BLD AUTO: 7.7 X10*3/UL (ref 4.4–11.3)

## 2025-01-05 PROCEDURE — 2500000001 HC RX 250 WO HCPCS SELF ADMINISTERED DRUGS (ALT 637 FOR MEDICARE OP): Performed by: HOSPITALIST

## 2025-01-05 PROCEDURE — 2500000002 HC RX 250 W HCPCS SELF ADMINISTERED DRUGS (ALT 637 FOR MEDICARE OP, ALT 636 FOR OP/ED): Performed by: HOSPITALIST

## 2025-01-05 PROCEDURE — 2500000005 HC RX 250 GENERAL PHARMACY W/O HCPCS: Performed by: INTERNAL MEDICINE

## 2025-01-05 PROCEDURE — 82947 ASSAY GLUCOSE BLOOD QUANT: CPT

## 2025-01-05 PROCEDURE — 2500000005 HC RX 250 GENERAL PHARMACY W/O HCPCS: Performed by: HOSPITALIST

## 2025-01-05 PROCEDURE — 36415 COLL VENOUS BLD VENIPUNCTURE: CPT | Performed by: INTERNAL MEDICINE

## 2025-01-05 PROCEDURE — 99233 SBSQ HOSP IP/OBS HIGH 50: CPT | Performed by: INTERNAL MEDICINE

## 2025-01-05 PROCEDURE — 1200000002 HC GENERAL ROOM WITH TELEMETRY DAILY

## 2025-01-05 PROCEDURE — 2500000002 HC RX 250 W HCPCS SELF ADMINISTERED DRUGS (ALT 637 FOR MEDICARE OP, ALT 636 FOR OP/ED): Performed by: INTERNAL MEDICINE

## 2025-01-05 PROCEDURE — 94640 AIRWAY INHALATION TREATMENT: CPT

## 2025-01-05 PROCEDURE — 85027 COMPLETE CBC AUTOMATED: CPT | Performed by: INTERNAL MEDICINE

## 2025-01-05 PROCEDURE — 80048 BASIC METABOLIC PNL TOTAL CA: CPT | Performed by: INTERNAL MEDICINE

## 2025-01-05 PROCEDURE — 2500000004 HC RX 250 GENERAL PHARMACY W/ HCPCS (ALT 636 FOR OP/ED): Performed by: HOSPITALIST

## 2025-01-05 RX ORDER — HYDROXYZINE PAMOATE 25 MG/1
25 CAPSULE ORAL EVERY 6 HOURS PRN
Status: DISCONTINUED | OUTPATIENT
Start: 2025-01-05 | End: 2025-01-08 | Stop reason: HOSPADM

## 2025-01-05 RX ADMIN — HEPARIN SODIUM 5000 UNITS: 5000 INJECTION, SOLUTION INTRAVENOUS; SUBCUTANEOUS at 21:36

## 2025-01-05 RX ADMIN — PANTOPRAZOLE SODIUM 40 MG: 40 TABLET, DELAYED RELEASE ORAL at 21:36

## 2025-01-05 RX ADMIN — LEVOTHYROXINE SODIUM 250 MCG: 0.12 TABLET ORAL at 06:49

## 2025-01-05 RX ADMIN — ACETAMINOPHEN 650 MG: 325 TABLET, FILM COATED ORAL at 05:03

## 2025-01-05 RX ADMIN — IPRATROPIUM BROMIDE AND ALBUTEROL SULFATE 3 ML: 2.5; .5 SOLUTION RESPIRATORY (INHALATION) at 20:32

## 2025-01-05 RX ADMIN — METOPROLOL SUCCINATE 50 MG: 50 TABLET, EXTENDED RELEASE ORAL at 21:36

## 2025-01-05 RX ADMIN — ASPIRIN 81 MG: 81 TABLET, COATED ORAL at 09:00

## 2025-01-05 RX ADMIN — CLOPIDOGREL 75 MG: 75 TABLET ORAL at 09:01

## 2025-01-05 RX ADMIN — INSULIN GLARGINE 10 UNITS: 100 INJECTION, SOLUTION SUBCUTANEOUS at 21:36

## 2025-01-05 RX ADMIN — Medication 3 L/MIN: at 20:34

## 2025-01-05 RX ADMIN — HEPARIN SODIUM 5000 UNITS: 5000 INJECTION, SOLUTION INTRAVENOUS; SUBCUTANEOUS at 05:03

## 2025-01-05 RX ADMIN — AMIODARONE HYDROCHLORIDE 200 MG: 200 TABLET ORAL at 09:01

## 2025-01-05 RX ADMIN — Medication 3 L/MIN: at 07:37

## 2025-01-05 RX ADMIN — IPRATROPIUM BROMIDE AND ALBUTEROL SULFATE 3 ML: 2.5; .5 SOLUTION RESPIRATORY (INHALATION) at 15:21

## 2025-01-05 RX ADMIN — AMLODIPINE BESYLATE 5 MG: 5 TABLET ORAL at 09:02

## 2025-01-05 RX ADMIN — HEPARIN SODIUM 5000 UNITS: 5000 INJECTION, SOLUTION INTRAVENOUS; SUBCUTANEOUS at 16:12

## 2025-01-05 RX ADMIN — VENLAFAXINE HYDROCHLORIDE 150 MG: 75 CAPSULE, EXTENDED RELEASE ORAL at 09:00

## 2025-01-05 RX ADMIN — IPRATROPIUM BROMIDE AND ALBUTEROL SULFATE 3 ML: 2.5; .5 SOLUTION RESPIRATORY (INHALATION) at 07:37

## 2025-01-05 RX ADMIN — ATORVASTATIN CALCIUM 40 MG: 40 TABLET, FILM COATED ORAL at 09:01

## 2025-01-05 RX ADMIN — HYDROXYZINE PAMOATE 25 MG: 25 CAPSULE ORAL at 09:00

## 2025-01-05 RX ADMIN — PANTOPRAZOLE SODIUM 40 MG: 40 TABLET, DELAYED RELEASE ORAL at 09:01

## 2025-01-05 RX ADMIN — INSULIN LISPRO 4 UNITS: 100 INJECTION, SOLUTION INTRAVENOUS; SUBCUTANEOUS at 21:36

## 2025-01-05 RX ADMIN — DEXTROSE MONOHYDRATE 25 G: 25 INJECTION, SOLUTION INTRAVENOUS at 19:59

## 2025-01-05 RX ADMIN — METOPROLOL SUCCINATE 50 MG: 50 TABLET, EXTENDED RELEASE ORAL at 09:00

## 2025-01-05 RX ADMIN — DEXTROSE MONOHYDRATE 12.5 G: 25 INJECTION, SOLUTION INTRAVENOUS at 16:09

## 2025-01-05 RX ADMIN — FERROUS GLUCONATE 324 MG: 324 TABLET ORAL at 09:01

## 2025-01-05 RX ADMIN — Medication 3 L/MIN: at 09:22

## 2025-01-05 ASSESSMENT — COGNITIVE AND FUNCTIONAL STATUS - GENERAL
DAILY ACTIVITIY SCORE: 13
PERSONAL GROOMING: A LOT
EATING MEALS: A LITTLE
MOVING TO AND FROM BED TO CHAIR: A LITTLE
MOBILITY SCORE: 16
MOVING FROM LYING ON BACK TO SITTING ON SIDE OF FLAT BED WITH BEDRAILS: A LITTLE
STANDING UP FROM CHAIR USING ARMS: A LITTLE
HELP NEEDED FOR BATHING: A LOT
TURNING FROM BACK TO SIDE WHILE IN FLAT BAD: A LOT
MOBILITY SCORE: 24
DRESSING REGULAR LOWER BODY CLOTHING: A LOT
WALKING IN HOSPITAL ROOM: A LITTLE
DRESSING REGULAR UPPER BODY CLOTHING: A LOT
CLIMB 3 TO 5 STEPS WITH RAILING: A LOT
TOILETING: A LOT

## 2025-01-05 NOTE — PROGRESS NOTES
"Daphne Morris is a 69 y.o. female on day 5 of admission presenting with Influenza.    Subjective   Feels \"good\".  On 3 L nasal cannula oxygen.  Refused BiPAP (18/8 with a rate of 18 and 40% FiO2) due to claustrophobia and anxiety.  BNP yesterday was 2585 prior to dialysis.  Off of steroids.     Objective   Physical Exam  Vitals  Blood pressure 121/78, pulse 69, temperature 36.9 °C (98.5 °F), temperature source Axillary, resp. rate 18, weight 70.7 kg (155 lb 13.8 oz), SpO2 90%.  Intake/Output last 3 Shifts:  I/O last 3 completed shifts:  In: 1200 (17 mL/kg) [P.O.:200; I.V.:600 (8.5 mL/kg); Other:400]  Out: 1900 (26.9 mL/kg) [Other:1900]  Weight: 70.7 kg     General-awake, alert and in no acute distress.  Lungs-scattered rales without wheezes or rhonchi.  Heart-regular rate and rhythm; II/VI MAIKEL at the LSB.  Abdomen-positive bowel sounds.  Extremities-trace lower extremity edema.  Skin-no rashes.    Scheduled medications  amiodarone, 200 mg, oral, Daily with breakfast  amLODIPine, 5 mg, oral, Daily  aspirin, 81 mg, oral, Daily  atorvastatin, 40 mg, oral, Daily  clopidogrel, 75 mg, oral, Daily  ferrous gluconate, 324 mg, oral, Daily with breakfast  heparin (porcine), 5,000 Units, subcutaneous, q8h SUMIT  insulin glargine, 10 Units, subcutaneous, Nightly  insulin lispro, 0-10 Units, subcutaneous, Before meals & nightly  ipratropium-albuteroL, 3 mL, nebulization, TID  levothyroxine, 250 mcg, oral, Daily before breakfast  metoprolol succinate XL, 50 mg, oral, BID  oxygen, , inhalation, Continuous - Inhalation  oxygen, , inhalation, Continuous - Inhalation  pantoprazole, 40 mg, oral, BID  venlafaxine XR, 150 mg, oral, Daily      Continuous medications     PRN medications  PRN medications: acetaminophen **OR** acetaminophen **OR** acetaminophen, dextrose, dextrose, glucagon, glucagon, hydrOXYzine pamoate, ipratropium-albuteroL, ondansetron ODT **OR** ondansetron, oxygen     Results for orders placed or performed during the " hospital encounter of 12/31/24 (from the past 24 hours)   POCT GLUCOSE   Result Value Ref Range    POCT Glucose 77 74 - 99 mg/dL   POCT GLUCOSE   Result Value Ref Range    POCT Glucose 69 (L) 74 - 99 mg/dL   POCT GLUCOSE   Result Value Ref Range    POCT Glucose 103 (H) 74 - 99 mg/dL   Basic Metabolic Panel   Result Value Ref Range    Glucose 87 74 - 99 mg/dL    Sodium 131 (L) 136 - 145 mmol/L    Potassium 4.2 3.5 - 5.3 mmol/L    Chloride 94 (L) 98 - 107 mmol/L    Bicarbonate 27 21 - 32 mmol/L    Anion Gap 14 10 - 20 mmol/L    Urea Nitrogen 32 (H) 6 - 23 mg/dL    Creatinine 3.49 (H) 0.50 - 1.05 mg/dL    eGFR 14 (L) >60 mL/min/1.73m*2    Calcium 7.6 (L) 8.6 - 10.3 mg/dL   CBC   Result Value Ref Range    WBC 7.7 4.4 - 11.3 x10*3/uL    nRBC 0.3 (H) 0.0 - 0.0 /100 WBCs    RBC 3.73 (L) 4.00 - 5.20 x10*6/uL    Hemoglobin 11.6 (L) 12.0 - 16.0 g/dL    Hematocrit 37.8 36.0 - 46.0 %     (H) 80 - 100 fL    MCH 31.1 26.0 - 34.0 pg    MCHC 30.7 (L) 32.0 - 36.0 g/dL    RDW 13.5 11.5 - 14.5 %    Platelets 178 150 - 450 x10*3/uL   POCT GLUCOSE   Result Value Ref Range    POCT Glucose 86 74 - 99 mg/dL     *Note: Due to a large number of results and/or encounters for the requested time period, some results have not been displayed. A complete set of results can be found in Results Review.      Assessment:  69-year-old woman with a history of hypertension, coronary artery disease, congestive heart failure, peripheral vascular disease, probable dementia, diabetes, atrial fibrillation, COPD, chronic hypoxia-on 3 L oxygen, hypothyroidism, left CVA, chronic renal failure-on dialysis, anemia, depression, right pleural effusion, DJD, renal cell carcinoma-status post partial nephrectomy, TIA, vasculitis, left atrial thrombus and obstructive sleep apnea-noncompliant with CPAP, who was admitted after being found down on the floor for several hours, with acute-on-chronic hypoxic and hypercapnic respiratory failure, persistent right pleural  effusion and positive Influenza A infection.  The patient underwent a right thoracentesis which showed chemistries consistent with a transudate, with negative cultures and pending cytology, most likely due to renal failure and hypothyroidism.  She has been refusing BiPAP.  She has probable significant fluid overload, despite dialysis yesterday, with hyponatremia.  She has COPD, but there is no bronchospasm presently.    Recommend:  1.  Continue DuoNeb, Synthroid and subcutaneous heparin.  2.  Wean FiO2 to keep oxygen saturation approximately 92 to 95%; home oxygen evaluation prior to discharge; consider discontinuing BiPAP, as the patient is noncompliant; would avoid Ativan and Vistaril due to potential respiratory depression.  3.  Incentive spirometry and Acapella treatment, if the patient is able to cooperate.  4.  Check pleural fluid cytology--pending.  5.  Aggressive fluid removal with dialysis as blood pressure allows.  6.  Palliative care consult--pending.    Lewis Yeung MD

## 2025-01-05 NOTE — ASSESSMENT & PLAN NOTE
1/5:  Cont regimen. Steroids stopped by Pulm.   Add vistaril.   With hypercapnia and acidosis opiates and benzos are too risky.     F/up fluid studies.     We spoke a bit about advance directives and she will think about it and is ok to speak with palliative team further. I think she's hospice appropriate.     HD was yesterday.   Na better.     SNF eventually.       1/4:  She continues to decline to wear bipap. At this point will stop checking blood gases and consult palliative. Best course is probably dnr/dni and maybe hospice. Will wean down iv steroids, cont bipap as much as she will agree.     S/p R thora, 1L... f/up fluid studies.     HypoNa... HD today.   DM well controlled.     Pt/ot        1/3:  Still hypercapnic, lethargic, acidotic... repeat abg again today and likely needs to go back on bipap during the day as well as night.     R thoracentesis today.     HypoNa... improving.... per renal in HD.   K normalized.     HD per renal  DM... well controlled.     Pt/ot    Dispo likely new snf.       1/2:  Aecopd, hypercapnia, home o2 3L, uses nocturnal bipap, flu+... last blood gas still retaining... cont nocturnal bipap, iv steroids, pulm recs, will recheck blood gas now and may need daytime as well.     Metabolic encephalopathy... related to hypercapnia, acute illness, hypoNa, likely underling dementia... more awake but confused, unclear baseline but she admits to medication and bipap non compliance. Says she lives at home alone.     R pleural effusion, moderate... thoracentesis, needs family for consent.     Uncontrolled hypothyroidism... likely med non compliance... resume home regimen, monitor OP.     ESRD... HD today.   HypoNa.... correct in HD, monitor.   HyperK... HD today, monitor.     Hx afib... home regimen with amio, bb.   Hx CVA/CAD/PVD... asa, plavix, statin.     DM, A1c 8.8%... lantus 10, ss.     Deconditioning... pt/ot    Home regimen for chronic conditions.   Dvt px with heparin.     Dispo is  most likely snf.   Palliative consult may be appropriate.         1/1:    - pt with allergy to Tamiflu, therefore did not start  - supportive therapy    Hypercapnic respiratory failure  - has a hx of COPD, with baseline oxygen requirement of 3 LPM, however, recently had been requiring 5 LPM.   - found down at home for uncertain amt of time, possibly 16+ hrs off oxygen  - PaCO2 was improving at [facility], however, upon arrival to [facility] CO2 has increased.   - Continue BiPAP, will repeat ABG in a few hrs to check for improvement  - solumedrol for COPD exacerbation  - albuterol MDI, have not ordered duonebs due to Influenza.     Atrial fibrillation  - telemetry  - Amiodarone  - continue metoprolol for rate control  - does not appear to be on AC    Hypothyroidism  - continue synthroid, check TSH    PVD  - Plavix    DM  - continue Lantus, ISS  - currently pt is hypoglycemic; hypoglycemia protocol  - will start D10 if needed, however, need to limit volume due to HD    ESRD on HD  - T, Th, Sat  - nephrology consult

## 2025-01-05 NOTE — PROGRESS NOTES
Daphne Morris is a 69 y.o. female on day 5 of admission presenting with Influenza.      Subjective   Awake but mildly lethargic. Still not wearing bipap. Says she will try it with vistaril for anxiety. Afebrile. On 3L. No overnight events reported.         Objective     Last Recorded Vitals  /78 (BP Location: Left arm, Patient Position: Sitting)   Pulse 69   Temp 36.9 °C (98.5 °F) (Axillary)   Resp 18   Wt 70.7 kg (155 lb 13.8 oz)   SpO2 90%   Intake/Output last 3 Shifts:    Intake/Output Summary (Last 24 hours) at 1/5/2025 0939  Last data filed at 1/4/2025 1635  Gross per 24 hour   Intake 1000 ml   Output 1900 ml   Net -900 ml       Admission Weight  Weight: 70.7 kg (155 lb 13.8 oz) (01/02/25 0600)    Daily Weight  01/02/25 : 70.7 kg (155 lb 13.8 oz)    Image Results  US thoracentesis  Narrative: Interpreted By:  Danielle Hutson,   STUDY:  US THORACENTESIS; 1/3/657029:51 pm      INDICATION:  Signs/Symptoms:Right pleural effusion.      COMPARISON:  None.      ACCESSION NUMBER(S):  [id number]      ORDERING CLINICIAN:  ANGEL RAMOS      TECHNIQUE:  INTERVENTIONALIST:  Danielle Hutson      CONSENT:  The patient/patient's POA/next of kin was informed of the nature of  the proposed procedure. Risks were discussed including but not  limited to bleeding, infection, pain at insertion site, or  pneumo/hemothorax requiring chest tube placement. Purpose of  treatment and alternative options were also reviewed. All questions  were answered and patient agreed to proceed.      SEDATION:  None      MEDICATION/CONTRAST:  Lidocaine 1%.      TIME OUT:  A time out was performed immediately prior to procedure start with  the interventional team, correctly identifying the patient name, date  of birth, MRN, procedure, anatomy (including marking of site and  side), patient position, procedure consent form, relevant laboratory  and imaging test results, antibiotic administration, safety  precautions, and  procedure-specific equipment needs.      FINDINGS:  The patient was placed in the left lateral decubitus position.      The patient's right pleural space was scanned using the ultrasound  probe. The area of fluid most amenable to drainage was marked. Color  doppler was used to assess for vasculature in the intended field.      The patient's skin was sterilized using chlorhexidine and draped in  sterile manner. The skin was anesthestized with 1% lidocaine.  Under  real time ultrasound guidance, a 5F valved centesis catheter was  inserted into the right pleural space where previously marked. A  total of 1,000 mL of clear yellow pleural fluid was removed. The  catheter was then removed and a sterile op site was placed over the  insertion site. Sterile technique used throughout entirety of  procedure.      Specimens were obtained, labeled and sent to lab with requisitions  ordered by primary team.      The patient tolerated the procedure well and there were no immediate  complications.      Impression: Uneventful thoracentesis, as detailed above: Right Pleural space,  1,000 mL      Performed and dictated at [facility].      Signed by: Danielle Hutson 1/3/2025 12:51 PM  Dictation workstation:   [id number]      Physical Exam  Constitutional:       Appearance: Normal appearance.   Cardiovascular:      Rate and Rhythm: Normal rate and regular rhythm.   Pulmonary:      Effort: Pulmonary effort is normal.      Breath sounds: Rhonchi present.   Abdominal:      General: Abdomen is flat. Bowel sounds are normal.      Palpations: Abdomen is soft.   Musculoskeletal:      Cervical back: Normal range of motion.   Skin:     General: Skin is warm.   Neurological:      General: No focal deficit present.      Mental Status: She is alert.         Relevant Results    Assessment & Plan  Influenza      1/5:  Cont regimen. Steroids stopped by Pulm.   Add vistaril.   With hypercapnia and acidosis opiates  and benzos are too risky.     F/up fluid studies.     We spoke a bit about advance directives and she will think about it and is ok to speak with palliative team further. I think she's hospice appropriate.     HD was yesterday.   Na better.     SNF eventually.       1/4:  She continues to decline to wear bipap. At this point will stop checking blood gases and consult palliative. Best course is probably dnr/dni and maybe hospice. Will wean down iv steroids, cont bipap as much as she will agree.     S/p R thora, 1L... f/up fluid studies.     HypoNa... HD today.   DM well controlled.     Pt/ot        1/3:  Still hypercapnic, lethargic, acidotic... repeat abg again today and likely needs to go back on bipap during the day as well as night.     R thoracentesis today.     HypoNa... improving.... per renal in HD.   K normalized.     HD per renal  DM... well controlled.     Pt/ot    Dispo likely new snf.       1/2:  Aecopd, hypercapnia, home o2 3L, uses nocturnal bipap, flu+... last blood gas still retaining... cont nocturnal bipap, iv steroids, pulm recs, will recheck blood gas now and may need daytime as well.     Metabolic encephalopathy... related to hypercapnia, acute illness, hypoNa, likely underling dementia... more awake but confused, unclear baseline but she admits to medication and bipap non compliance. Says she lives at home alone.     R pleural effusion, moderate... thoracentesis, needs family for consent.     Uncontrolled hypothyroidism... likely med non compliance... resume home regimen, monitor OP.     ESRD... HD today.   HypoNa.... correct in HD, monitor.   HyperK... HD today, monitor.     Hx afib... home regimen with amio, bb.   Hx CVA/CAD/PVD... asa, plavix, statin.     DM, A1c 8.8%... lantus 10, ss.     Deconditioning... pt/ot    Home regimen for chronic conditions.   Dvt px with heparin.     Dispo is most likely snf.   Palliative consult may be appropriate.         1/1:    - pt with allergy to Tamiflu,  therefore did not start  - supportive therapy    Hypercapnic respiratory failure  - has a hx of COPD, with baseline oxygen requirement of 3 LPM, however, recently had been requiring 5 LPM.   - found down at home for uncertain amt of time, possibly 16+ hrs off oxygen  - PaCO2 was improving at [facility], however, upon arrival to SDU CO2 has increased.   - Continue BiPAP, will repeat ABG in a few hrs to check for improvement  - solumedrol for COPD exacerbation  - albuterol MDI, have not ordered duonebs due to Influenza.     Atrial fibrillation  - telemetry  - Amiodarone  - continue metoprolol for rate control  - does not appear to be on AC    Hypothyroidism  - continue synthroid, check TSH    PVD  - Plavix    DM  - continue Lantus, ISS  - currently pt is hypoglycemic; hypoglycemia protocol  - will start D10 if needed, however, need to limit volume due to HD    ESRD on HD  - T, Th, Sat  - nephrology consult                  Ricki Rojas MD

## 2025-01-05 NOTE — CARE PLAN
The patient's goals for the shift include Breath better     The clinical goals for the shift include Patient will remain free of pain and discomforts until the end of the shift    Over the shift, the patient did make progress toward the following goals.

## 2025-01-05 NOTE — PROGRESS NOTES
Daphne Morris is a 69 y.o. female on day 5 of admission presenting with Influenza.      Subjective   Still lethargic, refusing to be placed on BiPAP because of phobia, hemodialyzed yesterday   Steroid discontinued as per pulmonary    Objective        Vitals 24HR  Heart Rate:  [60-69]   Temp:  [36 °C (96.8 °F)-37.1 °C (98.8 °F)]   Resp:  [18-20]   BP: (121-153)/(62-78)   SpO2:  [90 %-100 %]     Intake/Output last 3 Shifts:    Intake/Output Summary (Last 24 hours) at 1/5/2025 1157  Last data filed at 1/5/2025 0957  Gross per 24 hour   Intake 1000 ml   Output 1900 ml   Net -900 ml       Physical Exam      GEN appearance: Ill-appearing lady on 4 L oxygen via nasal cannula  Head and ENT: Normocephalic/atraumatic/supple neck/no JVD  Lungs: Bilateral rhonchi  Heart: RRR  Abdomen; no tenderness organomegaly  Extremities; no edema     Dialysis access= right upper arm AV fistula with positive bruit and thrill         ,  Relevant Results     Results from last 7 days   Lab Units 01/05/25  0555 01/04/25  0554 01/03/25  0616   WBC AUTO x10*3/uL 7.7 6.5 7.3   HEMOGLOBIN g/dL 11.6* 11.5* 11.4*   HEMATOCRIT % 37.8 37.4 37.9   PLATELETS AUTO x10*3/uL 178 190 202      Results from last 7 days   Lab Units 01/05/25  0555 01/04/25  0554 01/03/25  0939   SODIUM mmol/L 131* 126* 129*   POTASSIUM mmol/L 4.2 5.3 5.0   CHLORIDE mmol/L 94* 91* 91*   CO2 mmol/L 27 26 29   BUN mg/dL 32* 52* 40*   CREATININE mg/dL 3.49* 4.45* 3.76*   GLUCOSE mg/dL 87 117* 120*   CALCIUM mg/dL 7.6* 7.5* 7.7*        Current Facility-Administered Medications:     acetaminophen (Tylenol) tablet 650 mg, 650 mg, oral, q4h PRN, 650 mg at 01/05/25 0503 **OR** acetaminophen (Tylenol) oral liquid 650 mg, 650 mg, oral, q4h PRN **OR** acetaminophen (Tylenol) suppository 650 mg, 650 mg, rectal, q4h PRN, Ghazal Gomez MD    amiodarone (Pacerone) tablet 200 mg, 200 mg, oral, Daily with breakfast, Ghazal Gomez MD, 200 mg at 01/05/25 0901    amLODIPine (Norvasc) tablet 5 mg,  5 mg, oral, Daily, Ghazal Gomez MD, 5 mg at 01/05/25 0902    aspirin EC tablet 81 mg, 81 mg, oral, Daily, Ghazal Gomez MD, 81 mg at 01/05/25 0900    atorvastatin (Lipitor) tablet 40 mg, 40 mg, oral, Daily, Ghazal Gomez MD, 40 mg at 01/05/25 0901    clopidogrel (Plavix) tablet 75 mg, 75 mg, oral, Daily, Ghazal Gomez MD, 75 mg at 01/05/25 0901    dextrose 50 % injection 12.5 g, 12.5 g, intravenous, q15 min PRN, Ghazal Gomez MD, 12.5 g at 01/01/25 0000    dextrose 50 % injection 25 g, 25 g, intravenous, q15 min PRN, Ghazal Gomez MD, 25 g at 01/01/25 0533    ferrous gluconate (Fergon) 324 (38 Fe) mg tablet 324 mg, 324 mg, oral, Daily with breakfast, Ghazal Gomez MD, 324 mg at 01/05/25 0901    glucagon (Glucagen) injection 1 mg, 1 mg, intramuscular, q15 min PRN, Ghazal Gomez MD    glucagon (Glucagen) injection 1 mg, 1 mg, intramuscular, q15 min PRN, Ghazal Gomez MD    heparin (porcine) injection 5,000 Units, 5,000 Units, subcutaneous, q8h SUMIT, Ghazal Gomez MD, 5,000 Units at 01/05/25 0503    hydrOXYzine pamoate (Vistaril) capsule 25 mg, 25 mg, oral, q6h PRN, Ricki Rojas MD, 25 mg at 01/05/25 0900    insulin glargine (Lantus) injection 10 Units, 10 Units, subcutaneous, Nightly, Ghazal Gomez MD, 10 Units at 01/04/25 2005    insulin lispro injection 0-10 Units, 0-10 Units, subcutaneous, Before meals & nightly, Ghazal Gomez MD, 2 Units at 01/03/25 2111    ipratropium-albuteroL (Duo-Neb) 0.5-2.5 mg/3 mL nebulizer solution 3 mL, 3 mL, nebulization, TID, Ghazal Gomez MD, 3 mL at 01/05/25 0737    ipratropium-albuteroL (Duo-Neb) 0.5-2.5 mg/3 mL nebulizer solution 3 mL, 3 mL, nebulization, q2h PRN, Ghazal Gomez MD    levothyroxine (Synthroid, Levoxyl) tablet 250 mcg, 250 mcg, oral, Daily before breakfast, Ghazal Gomez MD, 250 mcg at 01/05/25 0649    metoprolol succinate XL (Toprol-XL) 24 hr tablet 50 mg, 50 mg, oral, BID, Ghazal Gomez MD, 50 mg at 01/05/25 0900    ondansetron ODT (Zofran-ODT)  disintegrating tablet 4 mg, 4 mg, oral, q8h PRN **OR** ondansetron (Zofran) injection 4 mg, 4 mg, intravenous, q8h PRN, Ghazal Gomez MD, 4 mg at 01/02/25 0219    oxygen (O2) therapy, , inhalation, Continuous - Inhalation, Ricki Rojas MD, 3 L/min at 01/05/25 0922    oxygen (O2) therapy, , inhalation, Continuous PRN - O2/gases, Lewis Yeung MD, 2 L/min at 01/04/25 2008    oxygen (O2) therapy, , inhalation, Continuous - Inhalation, Ricki Rojas MD, 3 L/min at 01/05/25 0737    pantoprazole (ProtoNix) EC tablet 40 mg, 40 mg, oral, BID, Ghazal Gomez MD, 40 mg at 01/05/25 0901    venlafaxine XR (Effexor-XR) 24 hr capsule 150 mg, 150 mg, oral, Daily, Ghazal Gomez MD, 150 mg at 01/05/25 0900           Assessment/Plan          1.  ESKD on hemodialysis at [facility] while via right upper arm AV fistula,  Patient  dialyzed yesterday.  2.  Influenza type A currently in isolation  3.  COPD on 3 L oxygen per minute  4.  Atrial fibrillation follow-up with cardiology  5.  Hyponatremia will limit free water intake to 1000 milliliters per 24 hours  Gradual resolving today sodium= 131 mmol/L     Continue all management as baseline follow-up with other consultants on daily basis with BMP and CBC                  Assessment & Plan  Influenza              I spent 35 minutes in the professional and overall care of this patient.      Marleny Patel MD

## 2025-01-05 NOTE — CARE PLAN
Problem: Pain - Adult  Goal: Verbalizes/displays adequate comfort level or baseline comfort level  Outcome: Progressing     Problem: Safety - Adult  Goal: Free from fall injury  Outcome: Progressing     Problem: Discharge Planning  Goal: Discharge to home or other facility with appropriate resources  Outcome: Progressing     Problem: Chronic Conditions and Co-morbidities  Goal: Patient's chronic conditions and co-morbidity symptoms are monitored and maintained or improved  Outcome: Progressing     Problem: Respiratory  Goal: Clear secretions with interventions this shift  Outcome: Progressing  Goal: Minimize anxiety/maximize coping throughout shift  Outcome: Progressing  Goal: Minimal/no exertional discomfort or dyspnea this shift  Outcome: Progressing  Goal: No signs of respiratory distress (eg. Use of accessory muscles. Peds grunting)  Outcome: Progressing  Goal: Patent airway maintained this shift  Outcome: Progressing  Goal: Tolerate mechanical ventilation evidenced by VS/agitation level this shift  Outcome: Progressing  Goal: Tolerate pulmonary toileting this shift  Outcome: Progressing  Goal: Verbalize decreased shortness of breath this shift  Outcome: Progressing  Goal: Wean oxygen to maintain O2 saturation per order/standard this shift  Outcome: Progressing  Goal: Increase self care and/or family involvement in next 24 hours  Outcome: Progressing     Problem: Skin  Goal: Decreased wound size/increased tissue granulation at next dressing change  Outcome: Progressing  Goal: Participates in plan/prevention/treatment measures  Outcome: Progressing  Goal: Prevent/manage excess moisture  Outcome: Progressing  Goal: Prevent/minimize sheer/friction injuries  Outcome: Progressing  Goal: Promote/optimize nutrition  Outcome: Progressing  Goal: Promote skin healing  Outcome: Progressing   The patient's goals for the shift include      The clinical goals for the shift include Patient will maintain safety by utilizing  the call light for all needs until the end of the shift

## 2025-01-06 ENCOUNTER — APPOINTMENT (OUTPATIENT)
Dept: DIALYSIS | Facility: HOSPITAL | Age: 70
End: 2025-01-06
Payer: MEDICARE

## 2025-01-06 ENCOUNTER — APPOINTMENT (OUTPATIENT)
Dept: CARDIOLOGY | Facility: HOSPITAL | Age: 70
End: 2025-01-06
Payer: MEDICARE

## 2025-01-06 VITALS
OXYGEN SATURATION: 93 % | TEMPERATURE: 96.1 F | DIASTOLIC BLOOD PRESSURE: 61 MMHG | SYSTOLIC BLOOD PRESSURE: 174 MMHG | WEIGHT: 155.87 LBS | HEART RATE: 68 BPM | BODY MASS INDEX: 25.16 KG/M2 | RESPIRATION RATE: 24 BRPM

## 2025-01-06 PROBLEM — J90 RECURRENT PLEURAL EFFUSION: Status: ACTIVE | Noted: 2025-01-06

## 2025-01-06 PROBLEM — J96.22 ACUTE ON CHRONIC RESPIRATORY FAILURE WITH HYPOXIA AND HYPERCAPNIA (MULTI): Status: ACTIVE | Noted: 2024-03-29

## 2025-01-06 LAB
ANION GAP BLDV CALCULATED.4IONS-SCNC: 9 MMOL/L (ref 10–25)
ANION GAP SERPL CALC-SCNC: 14 MMOL/L (ref 10–20)
ATRIAL RATE: 61 BPM
BACTERIA FLD CULT: NORMAL
BASE EXCESS BLDV CALC-SCNC: 1.9 MMOL/L (ref -2–3)
BODY TEMPERATURE: 37 DEGREES CELSIUS
BUN SERPL-MCNC: 38 MG/DL (ref 6–23)
CA-I BLDV-SCNC: 1.05 MMOL/L (ref 1.1–1.33)
CALCIUM SERPL-MCNC: 6.9 MG/DL (ref 8.6–10.3)
CHLORIDE BLDV-SCNC: 91 MMOL/L (ref 98–107)
CHLORIDE SERPL-SCNC: 93 MMOL/L (ref 98–107)
CO2 SERPL-SCNC: 24 MMOL/L (ref 21–32)
CREAT SERPL-MCNC: 4.1 MG/DL (ref 0.5–1.05)
EGFRCR SERPLBLD CKD-EPI 2021: 11 ML/MIN/1.73M*2
ERYTHROCYTE [DISTWIDTH] IN BLOOD BY AUTOMATED COUNT: 13.4 % (ref 11.5–14.5)
GLUCOSE BLD MANUAL STRIP-MCNC: 10 MG/DL (ref 74–99)
GLUCOSE BLD MANUAL STRIP-MCNC: 114 MG/DL (ref 74–99)
GLUCOSE BLD MANUAL STRIP-MCNC: 130 MG/DL (ref 74–99)
GLUCOSE BLD MANUAL STRIP-MCNC: 133 MG/DL (ref 74–99)
GLUCOSE BLD MANUAL STRIP-MCNC: 164 MG/DL (ref 74–99)
GLUCOSE BLD MANUAL STRIP-MCNC: 210 MG/DL (ref 74–99)
GLUCOSE BLD MANUAL STRIP-MCNC: 68 MG/DL (ref 74–99)
GLUCOSE BLD MANUAL STRIP-MCNC: 88 MG/DL (ref 74–99)
GLUCOSE BLD MANUAL STRIP-MCNC: 89 MG/DL (ref 74–99)
GLUCOSE BLD MANUAL STRIP-MCNC: 97 MG/DL (ref 74–99)
GLUCOSE BLD MANUAL STRIP-MCNC: <10 MG/DL (ref 74–99)
GLUCOSE BLDV-MCNC: 127 MG/DL (ref 74–99)
GLUCOSE SERPL-MCNC: 111 MG/DL (ref 74–99)
GRAM STN SPEC: NORMAL
GRAM STN SPEC: NORMAL
HCO3 BLDV-SCNC: 29.5 MMOL/L (ref 22–26)
HCT VFR BLD AUTO: 36.5 % (ref 36–46)
HCT VFR BLD EST: 35 % (ref 36–46)
HGB BLD-MCNC: 11.5 G/DL (ref 12–16)
HGB BLDV-MCNC: 11.8 G/DL (ref 12–16)
HOLD SPECIMEN: NORMAL
INHALED O2 CONCENTRATION: 32 %
LACTATE BLDV-SCNC: 0.8 MMOL/L (ref 0.4–2)
MAGNESIUM SERPL-MCNC: 2.33 MG/DL (ref 1.6–2.4)
MCH RBC QN AUTO: 31.3 PG (ref 26–34)
MCHC RBC AUTO-ENTMCNC: 31.5 G/DL (ref 32–36)
MCV RBC AUTO: 100 FL (ref 80–100)
NRBC BLD-RTO: 0 /100 WBCS (ref 0–0)
OXYHGB MFR BLDV: 84.7 % (ref 45–75)
P OFFSET: 176 MS
P ONSET: 122 MS
PCO2 BLDV: 60 MM HG (ref 41–51)
PH BLDV: 7.3 PH (ref 7.33–7.43)
PLATELET # BLD AUTO: 163 X10*3/UL (ref 150–450)
PO2 BLDV: 53 MM HG (ref 35–45)
POTASSIUM BLDV-SCNC: 4 MMOL/L (ref 3.5–5.3)
POTASSIUM SERPL-SCNC: 5.3 MMOL/L (ref 3.5–5.3)
Q ONSET: 216 MS
QRS COUNT: 10 BEATS
QRS DURATION: 132 MS
QT INTERVAL: 456 MS
QTC CALCULATION(BAZETT): 459 MS
QTC FREDERICIA: 458 MS
R AXIS: -49 DEGREES
RBC # BLD AUTO: 3.67 X10*6/UL (ref 4–5.2)
SAO2 % BLDV: 86 % (ref 45–75)
SODIUM BLDV-SCNC: 125 MMOL/L (ref 136–145)
SODIUM SERPL-SCNC: 126 MMOL/L (ref 136–145)
T AXIS: 90 DEGREES
T OFFSET: 444 MS
VENTRICULAR RATE: 61 BPM
WBC # BLD AUTO: 7.8 X10*3/UL (ref 4.4–11.3)

## 2025-01-06 PROCEDURE — 2500000002 HC RX 250 W HCPCS SELF ADMINISTERED DRUGS (ALT 637 FOR MEDICARE OP, ALT 636 FOR OP/ED): Performed by: HOSPITALIST

## 2025-01-06 PROCEDURE — 2500000005 HC RX 250 GENERAL PHARMACY W/O HCPCS: Performed by: HOSPITALIST

## 2025-01-06 PROCEDURE — 2500000004 HC RX 250 GENERAL PHARMACY W/ HCPCS (ALT 636 FOR OP/ED): Performed by: HOSPITALIST

## 2025-01-06 PROCEDURE — 93010 ELECTROCARDIOGRAM REPORT: CPT | Performed by: INTERNAL MEDICINE

## 2025-01-06 PROCEDURE — 36415 COLL VENOUS BLD VENIPUNCTURE: CPT

## 2025-01-06 PROCEDURE — 99497 ADVNCD CARE PLAN 30 MIN: CPT | Performed by: INTERNAL MEDICINE

## 2025-01-06 PROCEDURE — 97530 THERAPEUTIC ACTIVITIES: CPT | Mod: GP | Performed by: PHYSICAL THERAPIST

## 2025-01-06 PROCEDURE — 99233 SBSQ HOSP IP/OBS HIGH 50: CPT | Performed by: INTERNAL MEDICINE

## 2025-01-06 PROCEDURE — 82947 ASSAY GLUCOSE BLOOD QUANT: CPT

## 2025-01-06 PROCEDURE — 36415 COLL VENOUS BLD VENIPUNCTURE: CPT | Performed by: INTERNAL MEDICINE

## 2025-01-06 PROCEDURE — 83735 ASSAY OF MAGNESIUM: CPT

## 2025-01-06 PROCEDURE — 94640 AIRWAY INHALATION TREATMENT: CPT

## 2025-01-06 PROCEDURE — 99223 1ST HOSP IP/OBS HIGH 75: CPT | Performed by: INTERNAL MEDICINE

## 2025-01-06 PROCEDURE — 85027 COMPLETE CBC AUTOMATED: CPT | Performed by: INTERNAL MEDICINE

## 2025-01-06 PROCEDURE — 2500000001 HC RX 250 WO HCPCS SELF ADMINISTERED DRUGS (ALT 637 FOR MEDICARE OP): Performed by: HOSPITALIST

## 2025-01-06 PROCEDURE — 93005 ELECTROCARDIOGRAM TRACING: CPT

## 2025-01-06 PROCEDURE — 2500000004 HC RX 250 GENERAL PHARMACY W/ HCPCS (ALT 636 FOR OP/ED)

## 2025-01-06 PROCEDURE — 82435 ASSAY OF BLOOD CHLORIDE: CPT

## 2025-01-06 PROCEDURE — 94660 CPAP INITIATION&MGMT: CPT

## 2025-01-06 PROCEDURE — 2500000005 HC RX 250 GENERAL PHARMACY W/O HCPCS: Performed by: INTERNAL MEDICINE

## 2025-01-06 PROCEDURE — 82565 ASSAY OF CREATININE: CPT | Performed by: INTERNAL MEDICINE

## 2025-01-06 PROCEDURE — 8010000001 HC DIALYSIS - HEMODIALYSIS PER DAY

## 2025-01-06 PROCEDURE — 2060000001 HC INTERMEDIATE ICU ROOM DAILY

## 2025-01-06 RX ORDER — DEXTROSE MONOHYDRATE 100 MG/ML
20 INJECTION, SOLUTION INTRAVENOUS CONTINUOUS
Status: DISCONTINUED | OUTPATIENT
Start: 2025-01-06 | End: 2025-01-07

## 2025-01-06 RX ADMIN — HEPARIN SODIUM 5000 UNITS: 5000 INJECTION, SOLUTION INTRAVENOUS; SUBCUTANEOUS at 14:41

## 2025-01-06 RX ADMIN — IPRATROPIUM BROMIDE AND ALBUTEROL SULFATE 3 ML: 2.5; .5 SOLUTION RESPIRATORY (INHALATION) at 07:26

## 2025-01-06 RX ADMIN — AMLODIPINE BESYLATE 5 MG: 5 TABLET ORAL at 09:26

## 2025-01-06 RX ADMIN — VENLAFAXINE HYDROCHLORIDE 150 MG: 75 CAPSULE, EXTENDED RELEASE ORAL at 09:26

## 2025-01-06 RX ADMIN — DEXTROSE MONOHYDRATE 25 G: 25 INJECTION, SOLUTION INTRAVENOUS at 03:12

## 2025-01-06 RX ADMIN — AMIODARONE HYDROCHLORIDE 200 MG: 200 TABLET ORAL at 09:26

## 2025-01-06 RX ADMIN — CLOPIDOGREL 75 MG: 75 TABLET ORAL at 09:26

## 2025-01-06 RX ADMIN — DEXTROSE MONOHYDRATE 25 G: 25 INJECTION, SOLUTION INTRAVENOUS at 01:05

## 2025-01-06 RX ADMIN — HEPARIN SODIUM 5000 UNITS: 5000 INJECTION, SOLUTION INTRAVENOUS; SUBCUTANEOUS at 06:26

## 2025-01-06 RX ADMIN — HEPARIN SODIUM 5000 UNITS: 5000 INJECTION, SOLUTION INTRAVENOUS; SUBCUTANEOUS at 22:20

## 2025-01-06 RX ADMIN — IPRATROPIUM BROMIDE AND ALBUTEROL SULFATE 3 ML: 2.5; .5 SOLUTION RESPIRATORY (INHALATION) at 18:22

## 2025-01-06 RX ADMIN — ATORVASTATIN CALCIUM 40 MG: 40 TABLET, FILM COATED ORAL at 09:26

## 2025-01-06 RX ADMIN — IPRATROPIUM BROMIDE AND ALBUTEROL SULFATE 3 ML: 2.5; .5 SOLUTION RESPIRATORY (INHALATION) at 12:10

## 2025-01-06 RX ADMIN — DEXTROSE MONOHYDRATE 50 ML/HR: 100 INJECTION, SOLUTION INTRAVENOUS at 06:29

## 2025-01-06 RX ADMIN — LEVOTHYROXINE SODIUM 250 MCG: 0.12 TABLET ORAL at 09:26

## 2025-01-06 RX ADMIN — PANTOPRAZOLE SODIUM 40 MG: 40 TABLET, DELAYED RELEASE ORAL at 20:41

## 2025-01-06 RX ADMIN — Medication 3 L/MIN: at 07:26

## 2025-01-06 RX ADMIN — PANTOPRAZOLE SODIUM 40 MG: 40 TABLET, DELAYED RELEASE ORAL at 09:26

## 2025-01-06 RX ADMIN — Medication 40 PERCENT: at 02:00

## 2025-01-06 RX ADMIN — ASPIRIN 81 MG: 81 TABLET, COATED ORAL at 09:26

## 2025-01-06 RX ADMIN — DEXTROSE MONOHYDRATE 30 ML/HR: 100 INJECTION, SOLUTION INTRAVENOUS at 02:46

## 2025-01-06 RX ADMIN — Medication 3 L/MIN: at 12:10

## 2025-01-06 ASSESSMENT — COGNITIVE AND FUNCTIONAL STATUS - GENERAL
TURNING FROM BACK TO SIDE WHILE IN FLAT BAD: A LITTLE
WALKING IN HOSPITAL ROOM: A LITTLE
MOVING FROM LYING ON BACK TO SITTING ON SIDE OF FLAT BED WITH BEDRAILS: A LITTLE
DRESSING REGULAR LOWER BODY CLOTHING: A LOT
DAILY ACTIVITIY SCORE: 13
EATING MEALS: A LITTLE
STANDING UP FROM CHAIR USING ARMS: A LITTLE
PERSONAL GROOMING: A LOT
HELP NEEDED FOR BATHING: A LOT
MOBILITY SCORE: 16
MOVING TO AND FROM BED TO CHAIR: A LITTLE
TURNING FROM BACK TO SIDE WHILE IN FLAT BAD: A LOT
DRESSING REGULAR UPPER BODY CLOTHING: A LOT
CLIMB 3 TO 5 STEPS WITH RAILING: TOTAL
STANDING UP FROM CHAIR USING ARMS: A LITTLE
MOVING TO AND FROM BED TO CHAIR: A LITTLE
MOVING FROM LYING ON BACK TO SITTING ON SIDE OF FLAT BED WITH BEDRAILS: A LITTLE
WALKING IN HOSPITAL ROOM: A LITTLE
CLIMB 3 TO 5 STEPS WITH RAILING: A LOT
MOBILITY SCORE: 16
TOILETING: A LOT

## 2025-01-06 ASSESSMENT — PAIN - FUNCTIONAL ASSESSMENT
PAIN_FUNCTIONAL_ASSESSMENT: 0-10
PAIN_FUNCTIONAL_ASSESSMENT: NO/DENIES PAIN

## 2025-01-06 ASSESSMENT — PAIN SCALES - GENERAL
PAINLEVEL_OUTOF10: 0 - NO PAIN

## 2025-01-06 NOTE — PROGRESS NOTES
Daphne Morris is a 69 y.o. female on day 6 of admission presenting with Influenza.    Subjective   Denies any shortness of breath this morning. Was transferred to SDU for hypoglycemia. VBG with CO2 retention.       Objective   GEN: slumped over in her bed. Drowsy.   ENT: wearing o2 nasal cannula  CV: RRR, no m/g/r  LUNGS: poor effort, clear bilaterally, no w/r/r  EXT: no edema, cyanosis, clubbing    Physical Exam    Last Recorded Vitals  Blood pressure 126/67, pulse (!) 48, temperature 36.4 °C (97.6 °F), temperature source Temporal, resp. rate 17, weight 70.7 kg (155 lb 13.8 oz), SpO2 94%.  Intake/Output last 3 Shifts:  No intake/output data recorded.    Relevant Results  Scheduled medications  amiodarone, 200 mg, oral, Daily with breakfast  amLODIPine, 5 mg, oral, Daily  aspirin, 81 mg, oral, Daily  atorvastatin, 40 mg, oral, Daily  clopidogrel, 75 mg, oral, Daily  ferrous gluconate, 324 mg, oral, Daily with breakfast  heparin (porcine), 5,000 Units, subcutaneous, q8h SUMIT  [Held by provider] insulin glargine, 10 Units, subcutaneous, Nightly  insulin lispro, 0-10 Units, subcutaneous, Before meals & nightly  ipratropium-albuteroL, 3 mL, nebulization, TID  levothyroxine, 250 mcg, oral, Daily before breakfast  [Held by provider] metoprolol succinate XL, 50 mg, oral, BID  oxygen, , inhalation, Continuous - Inhalation  oxygen, , inhalation, Continuous - Inhalation  pantoprazole, 40 mg, oral, BID  venlafaxine XR, 150 mg, oral, Daily      Continuous medications  dextrose 10 % in water (D10W), 50 mL/hr, Last Rate: 50 mL/hr (01/06/25 0629)      PRN medications  PRN medications: acetaminophen **OR** acetaminophen **OR** acetaminophen, dextrose, dextrose, glucagon, glucagon, hydrOXYzine pamoate, ipratropium-albuteroL, ondansetron ODT **OR** ondansetron, oxygen    Results for orders placed or performed during the hospital encounter of 12/31/24 (from the past 24 hours)   POCT GLUCOSE   Result Value Ref Range    POCT Glucose 82  74 - 99 mg/dL   POCT GLUCOSE   Result Value Ref Range    POCT Glucose 64 (L) 74 - 99 mg/dL   POCT GLUCOSE   Result Value Ref Range    POCT Glucose 97 74 - 99 mg/dL   POCT GLUCOSE   Result Value Ref Range    POCT Glucose 49 (L) 74 - 99 mg/dL   POCT GLUCOSE   Result Value Ref Range    POCT Glucose 206 (H) 74 - 99 mg/dL   POCT GLUCOSE   Result Value Ref Range    POCT Glucose <10 (L) 74 - 99 mg/dL   POCT GLUCOSE   Result Value Ref Range    POCT Glucose 10 (L) 74 - 99 mg/dL   Electrocardiogram, 12-lead PRN ACS symptoms   Result Value Ref Range    Ventricular Rate 61 BPM    Atrial Rate 61 BPM    QRS Duration 132 ms    QT Interval 456 ms    QTC Calculation(Bazett) 459 ms    R Axis -49 degrees    T Axis 90 degrees    QRS Count 10 beats    Q Onset 216 ms    P Onset 122 ms    P Offset 176 ms    T Offset 444 ms    QTC Fredericia 458 ms   POCT GLUCOSE   Result Value Ref Range    POCT Glucose 210 (H) 74 - 99 mg/dL   Blood Gas Venous Full Panel   Result Value Ref Range    POCT pH, Venous 7.30 (L) 7.33 - 7.43 pH    POCT pCO2, Venous 60 (H) 41 - 51 mm Hg    POCT pO2, Venous 53 (H) 35 - 45 mm Hg    POCT SO2, Venous 86 (H) 45 - 75 %    POCT Oxy Hemoglobin, Venous 84.7 (H) 45.0 - 75.0 %    POCT Hematocrit Calculated, Venous 35.0 (L) 36.0 - 46.0 %    POCT Sodium, Venous 125 (L) 136 - 145 mmol/L    POCT Potassium, Venous 4.0 3.5 - 5.3 mmol/L    POCT Chloride, Venous 91 (L) 98 - 107 mmol/L    POCT Ionized Calicum, Venous 1.05 (L) 1.10 - 1.33 mmol/L    POCT Glucose, Venous 127 (H) 74 - 99 mg/dL    POCT Lactate, Venous 0.8 0.4 - 2.0 mmol/L    POCT Base Excess, Venous 1.9 -2.0 - 3.0 mmol/L    POCT HCO3 Calculated, Venous 29.5 (H) 22.0 - 26.0 mmol/L    POCT Hemoglobin, Venous 11.8 (L) 12.0 - 16.0 g/dL    POCT Anion Gap, Venous 9.0 (L) 10.0 - 25.0 mmol/L    Patient Temperature 37.0 degrees Celsius    FiO2 32 %   POCT GLUCOSE   Result Value Ref Range    POCT Glucose 114 (H) 74 - 99 mg/dL   POCT GLUCOSE   Result Value Ref Range    POCT Glucose  68 (L) 74 - 99 mg/dL   POCT GLUCOSE   Result Value Ref Range    POCT Glucose 164 (H) 74 - 99 mg/dL   POCT GLUCOSE   Result Value Ref Range    POCT Glucose 133 (H) 74 - 99 mg/dL   POCT GLUCOSE   Result Value Ref Range    POCT Glucose 130 (H) 74 - 99 mg/dL   Basic Metabolic Panel   Result Value Ref Range    Glucose 111 (H) 74 - 99 mg/dL    Sodium 126 (L) 136 - 145 mmol/L    Potassium 5.3 3.5 - 5.3 mmol/L    Chloride 93 (L) 98 - 107 mmol/L    Bicarbonate 24 21 - 32 mmol/L    Anion Gap 14 10 - 20 mmol/L    Urea Nitrogen 38 (H) 6 - 23 mg/dL    Creatinine 4.10 (H) 0.50 - 1.05 mg/dL    eGFR 11 (L) >60 mL/min/1.73m*2    Calcium 6.9 (L) 8.6 - 10.3 mg/dL   CBC   Result Value Ref Range    WBC 7.8 4.4 - 11.3 x10*3/uL    nRBC 0.0 0.0 - 0.0 /100 WBCs    RBC 3.67 (L) 4.00 - 5.20 x10*6/uL    Hemoglobin 11.5 (L) 12.0 - 16.0 g/dL    Hematocrit 36.5 36.0 - 46.0 %     80 - 100 fL    MCH 31.3 26.0 - 34.0 pg    MCHC 31.5 (L) 32.0 - 36.0 g/dL    RDW 13.4 11.5 - 14.5 %    Platelets 163 150 - 450 x10*3/uL   Magnesium   Result Value Ref Range    Magnesium 2.33 1.60 - 2.40 mg/dL     *Note: Due to a large number of results and/or encounters for the requested time period, some results have not been displayed. A complete set of results can be found in Results Review.                   This patient currently has cardiac telemetry ordered; if you would like to modify or discontinue the telemetry order, click here to go to the orders activity to modify/discontinue the order.                 Assessment/Plan   Assessment & Plan  Influenza    Recurrent pleural effusion    Acute on chronic respiratory failure with hypoxia and hypercapnia (Multi)    Summary:  69 year old woman with chronic hypoxic respiratory failure on 3L/min, COPD, recurrent right pleural effusion (transudate), stroke, ESRD on HD, SUZE. Presented as transfer from [facility] after being down on the floor with acute hypercapnic respiratory failure. On baseline home O2 requirements.  Remains somnolent. Underwent thoracentesis on 1/3/2025 again demonstrating transudate. She tested positive for influenza on 12/31/2024.    Recommendations:  -palliative care evaluation  -follow up pleural fluid cytology  -volume management with HD  -encourage NIV however she is consistently refusing  -could be potential candidate for tunneled pleural catheter as the effusion continues to recur despite dialysis, however, this would need to be addressed further with palliative care and patient's goals of care    Kylie Lincoln DO  Pulmonary & Critical Care  1/6/2025 9:28 AM

## 2025-01-06 NOTE — PROGRESS NOTES
Daphne Morris is a 69 y.o. female on day 6 of admission presenting with Influenza.      Subjective   Seen and examined on stepdown.  On a 10% dextrose infusion due to hypoglycemia.         Objective          Vitals 24HR  Heart Rate:  [47-68]   Temp:  [35.6 °C (96.1 °F)-37 °C (98.6 °F)]   Resp:  [16-24]   BP: (122-183)/(55-69)   SpO2:  [92 %-100 %]     Intake/Output last 3 Shifts:    Intake/Output Summary (Last 24 hours) at 1/6/2025 1150  Last data filed at 1/6/2025 1130  Gross per 24 hour   Intake 400 ml   Output --   Net 400 ml       Physical Exam  Constitutional:       Appearance: Elderly appearing female. In no acute distress. Drowsy.  HENT:      Head: Normocephalic and atraumatic.      Mouth: Mucous membranes are moist.   Eyes:      Extraocular Movements: Extraocular movements intact.      Conjunctiva/sclera: Conjunctivae normal.      Pupils: Pupils are equal, round, and reactive to light.   Cardiovascular:      Rate and Rhythm: Normal rate and regular rhythm.      Pulses: Normal pulses.      Heart sounds: Normal heart sounds.   Pulmonary:      Effort: Pulmonary effort is poor.     Breath sounds: Normal breath sounds.   Abdominal:      General: Abdomen is flat. Bowel sounds are normal.      Palpations: Abdomen is soft.      Comments: Non-tender, non-distended; BS present   Musculoskeletal:         Comments: Fistula RUE, positive thrill and bruit     Trace bilateral leg edema.  No joint swelling.  Skin:     General: Skin is warm and dry.   Neurological:      Mental Status: Cranial nerves II through XII grossly intact.   Psychiatric:      Comments: Flat.    Scheduled Medications  amiodarone, 200 mg, oral, Daily with breakfast  amLODIPine, 5 mg, oral, Daily  aspirin, 81 mg, oral, Daily  atorvastatin, 40 mg, oral, Daily  clopidogrel, 75 mg, oral, Daily  ferrous gluconate, 324 mg, oral, Daily with breakfast  heparin (porcine), 5,000 Units, subcutaneous, q8h SUMIT  [Held by provider] insulin glargine, 10 Units,  subcutaneous, Nightly  insulin lispro, 0-10 Units, subcutaneous, Before meals & nightly  ipratropium-albuteroL, 3 mL, nebulization, TID  levothyroxine, 250 mcg, oral, Daily before breakfast  [Held by provider] metoprolol succinate XL, 50 mg, oral, BID  oxygen, , inhalation, Continuous - Inhalation  oxygen, , inhalation, Continuous - Inhalation  pantoprazole, 40 mg, oral, BID  venlafaxine XR, 150 mg, oral, Daily      Continuous medications  dextrose 10 % in water (D10W), 20 mL/hr, Last Rate: 50 mL/hr (01/06/25 0629)        PRN medications: acetaminophen **OR** acetaminophen **OR** acetaminophen, dextrose, dextrose, glucagon, glucagon, hydrOXYzine pamoate, ipratropium-albuteroL, ondansetron ODT **OR** ondansetron, oxygen     Relevant Results  Results from last 7 days   Lab Units 01/06/25  0736 01/05/25  0555 01/04/25  0554 01/02/25  0500 01/01/25  0058 12/31/24  1730 12/31/24  0822   WBC AUTO x10*3/uL 7.8 7.7 6.5   < > 5.6 6.3 7.1   HEMOGLOBIN g/dL 11.5* 11.6* 11.5*   < > 10.8* 11.2* 12.0   HEMATOCRIT % 36.5 37.8 37.4   < > 33.9* 34.9* 38.2   PLATELETS AUTO x10*3/uL 163 178 190   < > 185 202 201   NEUTROS PCT AUTO %  --   --   --   --  80.2 79.4 78.9   LYMPHS PCT AUTO %  --   --   --   --  8.0 7.0 6.8   MONOS PCT AUTO %  --   --   --   --  10.5 11.4 8.9   EOS PCT AUTO %  --   --   --   --  0.0 0.9 4.3    < > = values in this interval not displayed.     Results from last 7 days   Lab Units 01/06/25  0736 01/05/25  0555 01/04/25  0554   SODIUM mmol/L 126* 131* 126*   POTASSIUM mmol/L 5.3 4.2 5.3   CHLORIDE mmol/L 93* 94* 91*   CO2 mmol/L 24 27 26   BUN mg/dL 38* 32* 52*   CREATININE mg/dL 4.10* 3.49* 4.45*   GLUCOSE mg/dL 111* 87 117*   CALCIUM mg/dL 6.9* 7.6* 7.5*       US thoracentesis   Final Result   Uneventful thoracentesis, as detailed above: Right Pleural space,   1,000 mL        Performed and dictated at [facility].        Signed by: Dainelle Hutson 1/3/2025 12:51 PM    Dictation workstation:   [id number]      XR chest 1 view   Final Result   1. Cardiomegaly. Mild vascular congestion. Moderate right pleural   effusion with atelectasis/consolidation at the right lung base.                  MACRO:   None.        Signed by: Beulah Chacon 1/1/2025 3:48 AM   Dictation workstation:   [id number]               Assessment/Plan   This patient currently has cardiac telemetry ordered; if you would like to modify or discontinue the telemetry order, click here to go to the orders activity to modify/discontinue the order.    Daphne Morris is a 69 y.o. female with a past medical history of ESRD on hemodialysis via right arm aVF, hypertension, coronary artery disease, COPD on 3 LPM's nightly, hypothyroidism, CVA with right sided residual weakness, atrial fibrillation, HFpEF, moderate pericardial effusion who was recently discharged from [facility] for syncope with collapse on 12/28. At discharge, she was on 5 L nasal cannula.  Reportedly she could not get her oxygen to work at home thus she presented to the ED the following day with shortness of breath.  She comes in now as a transfer from [facility] after being found down.  She was noted to be hypercapnic by ABG with a pH of 7.16, pCO2 of 76.  She was influenza A positive.  Nephrology is consulted for ESRD care.  She required a right thoracentesis with 1 L removed on 1/3.  She is now on continuous dextrose infusion due to hypoglycemia.  Palliative is following to establish goals of care.  With the 10% dextrose infusion she has worsening hyponatremia.  I will cut her right down to 20 mL/an hour.  I have placed orders for brief dialysis today to buffer against worsening hyponatremia.  Her hemoglobin is at target therefore no need for erythropoietin.  Nephrology will follow with her care.    Assessment & Plan  Influenza    Acute on chronic respiratory failure with hypoxia and hypercapnia (Multi)    Recurrent pleural effusion      I spent 40  minutes in the professional and overall care of this patient.      Kalpesh Urena, DO

## 2025-01-06 NOTE — PROGRESS NOTES
Physical Therapy                 Therapy Communication Note    Patient Name: Daphne Morris  MRN: [id number]  Department: [address]  Room: [address] [place], [place] [address]  Today's Date: 1/6/2025     Discipline: Physical Therapy    PT Missed Visit: Yes     Missed Visit Reason: Missed Visit Reason: Patient in a medical procedure    Missed Time: Attempt    Comment: Pt is currently at dialysis per RN.

## 2025-01-06 NOTE — SIGNIFICANT EVENT
Rapid Response Note  Initially called a stroke alert however was changed to rapid response as pts blood sugar was found to be less than 10. She was given an amp of D50 and within a few minutes became more awake and interactive. She was drowsy and had no focal deficits, dysarthria, aphasia, or facial palsy. Per nursing she was back to her baseline orientation which was A&O x1-2. Her vitals signs were stable and her SpO2 was 95% on 3L NC which is her home baseline O2 requirement. Her 12 lead ECG showed sinus bradycardia with a LBBB. Also per nursing she has been refusing to wear her NIV. We will transfer her to SDU for closer monitoring.     Plan  - Transfer to SDU for closer monitoring  - Continuous telemetry  - Hold metoprolol d/t bradycardia  - POCT glucose q30min x2, q1hr x4, then q4hrs  - Hold Lantus  - Check VBG as pt has hypercapnic respiratory failure and has been refusing NIV  - D10 infusion @ 30ml/hr  - Continue  Hypoglycemia orderset    Primary Dr Rojas notified of tonight's events

## 2025-01-06 NOTE — PRE-PROCEDURE NOTE
Report from Sending RN:    Report From: Jolie MOREIRA  Recent Surgery of Procedure: No  Baseline Level of Consciousness (LOC): AOx3  Oxygen Use: Yes  Type: NC  Diabetic: Yes  Last BP Med Given Day of Dialysis: SEE MAR  Last Pain Med Given: SEE MAR$  Lab Tests to be Obtained with Dialysis: No  Blood Transfusion to be Given During Dialysis: No  Available IV Access: Yes  Medications to be Administered During Dialysis: No  Continuous IV Infusion Running: Yes  Restraints on Currently or in the Last 24 Hours: No  Hand-Off Communication: Pt is alert and stable, Report taking from bedside nurse  Dialysis Catheter Dressing: NA  Last Dressing Change: NA

## 2025-01-06 NOTE — PROGRESS NOTES
Physical Therapy    Physical Therapy Treatment    Patient Name: Daphne Morris  MRN: [id number]  Department: [facility]  Room: [address] [place], [place] [address]  Today's Date: 1/6/2025  Time Calculation  Start Time: 1654  Stop Time: 1724  Time Calculation (min): 30 min         Assessment/Plan   PT Assessment  PT Assessment Results: Decreased strength, Decreased endurance, Impaired balance, Decreased mobility  Rehab Prognosis: Good  Barriers to Discharge Home: Caregiver assistance, Physical needs  Caregiver Assistance: Patient lives alone and/or does not have reliable caregiver assistance  Physical Needs: Stair navigation into home limited by function/safety, Intermittent mobility assistance needed, High falls risk due to function or environment  Evaluation/Treatment Tolerance: Patient tolerated treatment well  Medical Staff Made Aware: Yes  Strengths: Ability to acquire knowledge, Housing layout, Support of extended family/friends  Barriers to Participation: Comorbidities  End of Session Communication: Bedside nurse, PCT/NA/CTA  Assessment Comment: Pt  End of Session Patient Position: Bed, 3 rail up, Alarm on (needs in reach)  PT Plan  Inpatient/Swing Bed or Outpatient: Inpatient  PT Plan  Treatment/Interventions: Bed mobility, Transfer training, Gait training, Balance training, Strengthening, Endurance training  PT Plan: Ongoing PT  PT Frequency: 3 times per week  PT Discharge Recommendations: Moderate intensity level of continued care  Equipment Recommended upon Discharge: Wheeled walker (pt owns recommended equipment)  PT Recommended Transfer Status: Assist x1, Assistive device (with walker)  PT - OK to Discharge: Yes (per PT POC)      General Visit Information:   PT  Visit  PT Received On: 01/06/25  Response to Previous Treatment: Patient with no complaints from previous session.  General  Reason for Referral: 70 y/o F found on floor (possibly for 16 hours); found to have Influenza A and admitted with acute hypercapneic respiratory  failure, hyponatremia, metabolic encephalopathy d/t the aforemntioned as well as hypercapnea. Pt with rapid response over the weakness d/t low blood sugar and decreased responsiveness; moved to SDU for closer monitoring.   Referred By: Pancho Rojas MD  Past Medical History Relevant to Rehab: ESRD, dialysis dependent for about a year. Tues thurs sat schedule  Hypertension  CAD/PVD  Renal cell cancer  Hyperlipidemia  Diabetes  Atrial fib  COPD, on 3 liters, mostly at night  Hypothyroid  Depression  Visual impairment  Anemia  CVA, multiple. Right sided weakness  PT Missed Visit: Yes  Missed Visit Reason: Patient in a medical procedure  Family/Caregiver Present: No  Prior to Session Communication: Bedside nurse  Patient Position Received: Bed, 3 rail up, Alarm on  Preferred Learning Style: verbal, visual  General Comment: Patient pleasant, cooperative and agreeable to therapy tx.    Subjective   Precautions:  Precautions  Hearing/Visual Limitations: [place]  Medical Precautions: Fall precautions, Oxygen therapy device and L/min (3L per min via NC), Droplet precautions 2/2 Flu A          Objective   Pain:  Pain Assessment  Pain Assessment: 0-10  0-10 (Numeric) Pain Score: 0 - No pain  Cognition:  Cognition  Overall Cognitive Status: Within Functional Limits  Orientation Level: Other (Comment) (Oriented to person and place; time NT.)  Attention: Within Functional Limits  Memory: Within Funtional Limits  Insight: Mild  Impulsive: Within functional limits  Processing Speed: Within funtional limits  Coordination:  Movements are Fluid and Coordinated: Yes  Postural Control:  Postural Control  Postural Control: Within Functional Limits  Posture Comment: age appropriate  Static Sitting Balance  Static Sitting-Balance Support: No upper extremity supported, Feet supported  Static Sitting-Level of Assistance: Independent  Static Sitting-Comment/Number of Minutes: on EOB for grossly 15 min  Static Standing Balance  Static  Standing-Balance Support: Bilateral upper extremity supported  Static Standing-Level of Assistance: Close supervision  Static Standing-Comment/Number of Minutes: with RW  Dynamic Standing Balance  Dynamic Standing-Balance Support: Bilateral upper extremity supported  Dynamic Standing-Level of Assistance: Close supervision  Dynamic Standing-Balance:  (side stepping along EOB)  Dynamic Standing-Comments: with RW    Activity Tolerance:  Activity Tolerance  Endurance: Tolerates 10 - 20 min exercise with multiple rests  Activity Tolerance Comments: fair  Treatments:  Therapeutic Activity  Therapeutic Activity Performed: Yes  Therapeutic Activity 1: Pt educated in safe bed mobility technique moving supine->sit via sidelying; pt requires minimal VC's for technique and proper UE placements for upper body initiate rolling and to use R UE to push up into sitting from sidelying.     Pt provided instruction in safe sit<->stand technique to enable her to move in/out of bed/chair safely; pt required minimal verbal cues for proper hand placements and to scoot to edge of sitting surface to facilitate ease of sit->stand, and to line up to and reach back for sitting surface before sitting. Needs increased time and encouragement to perform this task.     Pt provided gait training with RW to enable her to safely ambulate household distances; required minimal verbal cues for walker management, proper sequencing, and safety.     Bed Mobility  Bed Mobility: Yes  Bed Mobility 1  Bed Mobility 1: Supine to sitting  Level of Assistance 1: Close supervision  Bed Mobility Comments 1: HOB raised  Bed Mobility 2  Bed Mobility  2: Sitting to supine  Level of Assistance 2: Minimum assistance  Bed Mobility Comments 2: assist with LE's  Bed Mobility 3  Bed Mobility 3: Scooting  Level of Assistance 3: Dependent, +2, Minimal verbal cues  Bed Mobility Comments 3: in supine up to HOB with TAPS draw sheet    Ambulation/Gait Training  Ambulation/Gait  "Training Performed: Yes  Ambulation/Gait Training 1  Surface 1: Level tile  Device 1: Rolling walker  Assistance 1: Contact guard, Minimal verbal cues  Quality of Gait 1: Decreased step length, Forward flexed posture  Comments/Distance (ft) 1: 3' side stepping to the right along the EOB  Transfers  Transfer: Yes  Transfer 1  Transfer From 1: Bed to  Transfer to 1: Stand, Sit  Technique 1: Sit to stand, Stand to sit  Transfer Device 1: Walker  Transfer Level of Assistance 1: Minimum assistance, Minimal verbal cues  Trials/Comments 1: EOB raised about 3\"-4\" from lowest setting    Outcome Measures:  [facility] Basic Mobility  Turning from your back to your side while in a flat bed without using bedrails: A little  Moving from lying on your back to sitting on the side of a flat bed without using bedrails: A little  Moving to and from bed to chair (including a wheelchair): A little  Standing up from a chair using your arms (e.g. wheelchair or bedside chair): A little  To walk in hospital room: A little  Climbing 3-5 steps with railing: Total  Basic Mobility - Total Score: 16    Education Documentation  Body Mechanics, taught by Ruma Barry PT at 1/6/2025  5:50 PM.  Learner: Patient  Readiness: Acceptance  Method: Explanation  Response: Verbalizes Understanding    Mobility Training, taught by Ruma Barry PT at 1/6/2025  5:50 PM.  Learner: Patient  Readiness: Acceptance  Method: Explanation  Response: Verbalizes Understanding    Education Comments  No comments found.        OP EDUCATION:       Encounter Problems       Encounter Problems (Active)       Balance       STG - PT independently maintains dynamic standing balance with upper extremity support on RW during walking, as well as with reaching activities. (Progressing)       Start:  01/03/25    Expected End:  01/17/25       INTERVENTIONS:  1. Practice standing with minimal support.  2. Educate patient about standing tolerance.  3. Educate patient about " independence with gait, transfers, and ADL's.  4. Educate patient about use of assistive device.  5. Educate patient about self-directed care.         STG - Maintains static standing balance with upper extremity support on RW for >3 minutes independently.  (Progressing)       Start:  01/03/25    Expected End:  01/17/25       INTERVENTIONS:  1. Practice standing with minimal support.  2. Educate patient about standing tolerance.  3. Educate patient about independence with gait, transfers, and ADL's.  4. Educate patient about use of assistive device.  5. Educate patient about self-directed care.            Mobility       STG - Patient will ambulate >50' on level surfaces with RW independently.  (Progressing)       Start:  01/03/25    Expected End:  01/17/25               PT Transfers       STG - Patient to transfer to and from sit to supine independently from a flat bed.  (Progressing)       Start:  01/03/25    Expected End:  01/17/25            STG - Patient will transfer sit to and from stand independently with RW. (Progressing)       Start:  01/03/25    Expected End:  01/17/25            Goal 1- LE strength  (Progressing)       Start:  01/03/25    Expected End:  01/17/25       Pt will improve LE strength to >/= 4/5 to enable achievement of gait and transfer goals.            Pain - Adult

## 2025-01-06 NOTE — CARE PLAN
Problem: Pain - Adult  Goal: Verbalizes/displays adequate comfort level or baseline comfort level  Outcome: Progressing     Problem: Safety - Adult  Goal: Free from fall injury  Outcome: Progressing     Problem: Discharge Planning  Goal: Discharge to home or other facility with appropriate resources  Outcome: Progressing     Problem: Chronic Conditions and Co-morbidities  Goal: Patient's chronic conditions and co-morbidity symptoms are monitored and maintained or improved  Outcome: Progressing     Problem: Respiratory  Goal: Clear secretions with interventions this shift  Outcome: Progressing  Goal: Minimize anxiety/maximize coping throughout shift  Outcome: Progressing  Goal: Minimal/no exertional discomfort or dyspnea this shift  Outcome: Progressing  Goal: No signs of respiratory distress (eg. Use of accessory muscles. Peds grunting)  Outcome: Progressing  Goal: Patent airway maintained this shift  Outcome: Progressing  Goal: Tolerate mechanical ventilation evidenced by VS/agitation level this shift  Outcome: Progressing  Goal: Tolerate pulmonary toileting this shift  Outcome: Progressing  Goal: Verbalize decreased shortness of breath this shift  Outcome: Progressing  Goal: Wean oxygen to maintain O2 saturation per order/standard this shift  Outcome: Progressing  Goal: Increase self care and/or family involvement in next 24 hours  Outcome: Progressing     Problem: Skin  Goal: Decreased wound size/increased tissue granulation at next dressing change  Outcome: Progressing  Goal: Participates in plan/prevention/treatment measures  Outcome: Progressing  Goal: Prevent/manage excess moisture  Outcome: Progressing  Goal: Prevent/minimize sheer/friction injuries  Outcome: Progressing  Goal: Promote/optimize nutrition  Outcome: Progressing  Goal: Promote skin healing  Outcome: Progressing   The patient's goals for the shift include      The clinical goals for the shift include Pt will remain free of injury this  shift

## 2025-01-06 NOTE — POST-PROCEDURE NOTE
Report to Receiving RN:    Report To: Jolie MOREIRA  Time Report Called: 1400  Hand-Off Communication: Pt removed 900, ZELDA 119/56, Hr 56  Complications During Treatment: No  Ultrafiltration Treatment: No  Medications Administered During Dialysis: No,   Blood Products Administered During Dialysis: No  Labs Sent During Dialysis: No  Heparin Drip Rate Changes: No  Dialysis Catheter Dressing: NA  Last Dressing Change: NA    Electronic Signatures:   (Signed )   Authored:    (Signed )   Authored:     Last Updated: 2:00 PM by MARK GARCIA

## 2025-01-06 NOTE — PROGRESS NOTES
Daphne Morris is a 69 y.o. female on day 6 of admission presenting with Influenza.    Subjective   Did on dextrose drip overnight due to blood glucose less than 10, noncompliant with BiPAP, on 3 L, patient is alert oriented x 3 currently.  Did tolerate breakfast if she tolerates lunch likely  will discontinue dextrose       Objective     Physical Exam  Vitals reviewed.   Constitutional:       Appearance: Normal appearance.   HENT:      Head: Normocephalic.      Right Ear: Tympanic membrane normal.      Nose: Nose normal.      Mouth/Throat:      Mouth: Mucous membranes are dry.   Cardiovascular:      Rate and Rhythm: Normal rate and regular rhythm.   Pulmonary:      Effort: Pulmonary effort is normal.   Abdominal:      General: Abdomen is flat. Bowel sounds are normal.      Palpations: Abdomen is soft.   Skin:     Capillary Refill: Capillary refill takes less than 2 seconds.   Neurological:      General: No focal deficit present.      Mental Status: She is alert.         Last Recorded Vitals  Blood pressure 121/59, pulse 54, temperature 36.1 °C (96.9 °F), temperature source Temporal, resp. rate 17, weight 70.7 kg (155 lb 13.8 oz), SpO2 100%.  Intake/Output last 3 Shifts:  No intake/output data recorded.    Relevant Results  Results for orders placed or performed during the hospital encounter of 12/31/24 (from the past 24 hours)   POCT GLUCOSE   Result Value Ref Range    POCT Glucose 64 (L) 74 - 99 mg/dL   POCT GLUCOSE   Result Value Ref Range    POCT Glucose 97 74 - 99 mg/dL   POCT GLUCOSE   Result Value Ref Range    POCT Glucose 49 (L) 74 - 99 mg/dL   POCT GLUCOSE   Result Value Ref Range    POCT Glucose 206 (H) 74 - 99 mg/dL   POCT GLUCOSE   Result Value Ref Range    POCT Glucose <10 (L) 74 - 99 mg/dL   POCT GLUCOSE   Result Value Ref Range    POCT Glucose 10 (L) 74 - 99 mg/dL   Electrocardiogram, 12-lead PRN ACS symptoms   Result Value Ref Range    Ventricular Rate 61 BPM    Atrial Rate 61 BPM    QRS Duration  132 ms    QT Interval 456 ms    QTC Calculation(Bazett) 459 ms    R Axis -49 degrees    T Axis 90 degrees    QRS Count 10 beats    Q Onset 216 ms    P Onset 122 ms    P Offset 176 ms    T Offset 444 ms    QTC Fredericia 458 ms   POCT GLUCOSE   Result Value Ref Range    POCT Glucose 210 (H) 74 - 99 mg/dL   Blood Gas Venous Full Panel   Result Value Ref Range    POCT pH, Venous 7.30 (L) 7.33 - 7.43 pH    POCT pCO2, Venous 60 (H) 41 - 51 mm Hg    POCT pO2, Venous 53 (H) 35 - 45 mm Hg    POCT SO2, Venous 86 (H) 45 - 75 %    POCT Oxy Hemoglobin, Venous 84.7 (H) 45.0 - 75.0 %    POCT Hematocrit Calculated, Venous 35.0 (L) 36.0 - 46.0 %    POCT Sodium, Venous 125 (L) 136 - 145 mmol/L    POCT Potassium, Venous 4.0 3.5 - 5.3 mmol/L    POCT Chloride, Venous 91 (L) 98 - 107 mmol/L    POCT Ionized Calicum, Venous 1.05 (L) 1.10 - 1.33 mmol/L    POCT Glucose, Venous 127 (H) 74 - 99 mg/dL    POCT Lactate, Venous 0.8 0.4 - 2.0 mmol/L    POCT Base Excess, Venous 1.9 -2.0 - 3.0 mmol/L    POCT HCO3 Calculated, Venous 29.5 (H) 22.0 - 26.0 mmol/L    POCT Hemoglobin, Venous 11.8 (L) 12.0 - 16.0 g/dL    POCT Anion Gap, Venous 9.0 (L) 10.0 - 25.0 mmol/L    Patient Temperature 37.0 degrees Celsius    FiO2 32 %   POCT GLUCOSE   Result Value Ref Range    POCT Glucose 114 (H) 74 - 99 mg/dL   POCT GLUCOSE   Result Value Ref Range    POCT Glucose 68 (L) 74 - 99 mg/dL   POCT GLUCOSE   Result Value Ref Range    POCT Glucose 164 (H) 74 - 99 mg/dL   POCT GLUCOSE   Result Value Ref Range    POCT Glucose 133 (H) 74 - 99 mg/dL   POCT GLUCOSE   Result Value Ref Range    POCT Glucose 130 (H) 74 - 99 mg/dL   Basic Metabolic Panel   Result Value Ref Range    Glucose 111 (H) 74 - 99 mg/dL    Sodium 126 (L) 136 - 145 mmol/L    Potassium 5.3 3.5 - 5.3 mmol/L    Chloride 93 (L) 98 - 107 mmol/L    Bicarbonate 24 21 - 32 mmol/L    Anion Gap 14 10 - 20 mmol/L    Urea Nitrogen 38 (H) 6 - 23 mg/dL    Creatinine 4.10 (H) 0.50 - 1.05 mg/dL    eGFR 11 (L) >60  mL/min/1.73m*2    Calcium 6.9 (L) 8.6 - 10.3 mg/dL   CBC   Result Value Ref Range    WBC 7.8 4.4 - 11.3 x10*3/uL    nRBC 0.0 0.0 - 0.0 /100 WBCs    RBC 3.67 (L) 4.00 - 5.20 x10*6/uL    Hemoglobin 11.5 (L) 12.0 - 16.0 g/dL    Hematocrit 36.5 36.0 - 46.0 %     80 - 100 fL    MCH 31.3 26.0 - 34.0 pg    MCHC 31.5 (L) 32.0 - 36.0 g/dL    RDW 13.4 11.5 - 14.5 %    Platelets 163 150 - 450 x10*3/uL   Magnesium   Result Value Ref Range    Magnesium 2.33 1.60 - 2.40 mg/dL   SST TOP   Result Value Ref Range    Extra Tube Hold for add-ons.    POCT GLUCOSE   Result Value Ref Range    POCT Glucose 97 74 - 99 mg/dL     *Note: Due to a large number of results and/or encounters for the requested time period, some results have not been displayed. A complete set of results can be found in Results Review.       Imaging Results  Cxr:  IMPRESSION:  1. Cardiomegaly. Mild vascular congestion. Moderate right pleural  effusion with atelectasis/consolidation at the right lung base.    status post right-sided thoracentesis January 2 had 1 L removed    Medications:  amiodarone, 200 mg, oral, Daily with breakfast  amLODIPine, 5 mg, oral, Daily  aspirin, 81 mg, oral, Daily  atorvastatin, 40 mg, oral, Daily  clopidogrel, 75 mg, oral, Daily  ferrous gluconate, 324 mg, oral, Daily with breakfast  heparin (porcine), 5,000 Units, subcutaneous, q8h SUMIT  [Held by provider] insulin glargine, 10 Units, subcutaneous, Nightly  insulin lispro, 0-10 Units, subcutaneous, Before meals & nightly  ipratropium-albuteroL, 3 mL, nebulization, TID  levothyroxine, 250 mcg, oral, Daily before breakfast  [Held by provider] metoprolol succinate XL, 50 mg, oral, BID  oxygen, , inhalation, Continuous - Inhalation  oxygen, , inhalation, Continuous - Inhalation  pantoprazole, 40 mg, oral, BID  venlafaxine XR, 150 mg, oral, Daily       PRN medications: acetaminophen **OR** acetaminophen **OR** acetaminophen, dextrose, dextrose, glucagon, glucagon, hydrOXYzine pamoate,  ipratropium-albuteroL, ondansetron ODT **OR** ondansetron, oxygen     Assessment/Plan     1.  Diabetes with hypoglycemic episodes  -Holding Lantus, maintain on sliding scale insulin if needed, currently on IV dextrose if tolerates lunch sugars maintain will likely discontinue    2.  Chronic hypoxic respiratory failure on 3 L of nasal cannula oxygen with acute hypercapnic respiratory failure with recurrent right-sided pleural effusion and influenza A  -Status post right-sided thoracentesis had 1 L removed  -Refuses noninvasive ventilation    3.  End-stage renal disease on hemodialysis  -Dialysis per nephrology    4.  Deliurium secondary to acute hypercapnic respiratory failure  -Resolved    5. A fib  -on amiodarone    6. Hypothyroidism  -levothyroxine    DVT Prophylaxis:  Heparin subq      Felicity Krishna MD  [facility]

## 2025-01-06 NOTE — NURSING NOTE
Rapid Response Nurse Note:     Daphne Morris is a 69 y.o. female on day 6 of admission presenting with Influenza.    Rounded on patient due to recent rapid response, reviewed patient's chart and checked with bedside nurse for any questions or concerns. No concerns voiced at this time.     The patient's current RADAR score is 2    /60 (BP Location: Left arm, Patient Position: Lying)   Pulse 55   Temp 35.9 °C (96.6 °F) (Temporal)   Resp 18   Wt 70.7 kg (155 lb 13.8 oz)   SpO2 98%     No signs/symptoms of distress at this time. Bedside nurse notified to escalate concerns if patient condition changes      Brook Phelps RN

## 2025-01-06 NOTE — CONSULTS
Inpatient consult to Palliative Care  Consult performed by: Lam Noe MD  Consult ordered by: Ricki Rojas MD        Reason For Consult  Reason for Consult: communication / medical decision making and patient/family support     History Of Present Illness  Daphne Morris is a 69 y.o. female with past medical history of COPD w/ CRF requiring 3L NC oxygen at home, CADM HTN, CHF, Afib, hypothyroidism, left CVA, ESRD on dialysis, RCC s/p partial nephrectomy, SUZE w/o CPAP, presenting with AECOPD requiring BiPAP d/t Hypercapnic respiratory failure. She was positive for influenza on admission. BNPEP over 2K. She completed antibiotics and steroids. She has ongoign dialysis. She has been declining NIMV. Palliative was consulted for communication and goals of care.   She has previously been DNR DNI on other admissions. This was reversed for possible surgical interventions, possibly thoracentesis.      Per patient, she has been essentially bedridden the last two weeks. Prior to this, she was having food delivered by her friend Nathaniel. She continues to smoke 0.5ppd.  She does not smoke with oxygen on.  She does not want to try CPAP or BiPAP today.      Conversation with Silver BUSH:  She was supposed to transition to [facility]. [facility] in [place] accepted her. I recommended that he call them about intake and whether she would be accepted there with her change in functional status. He reports that she has had multiple falls the last year and a sharper decline since 12/2024.   He will try to call her to encourage BiPAP.  We discussed her delirium, likely 2/2 retained CO2 and that she again expressed to me she would not like to have CPR or intubation. In view of her multiple organ failure, we agreed that these two procedures would not fulfill her goals regarding minimal acceptable outcome and maximal burden of disease. He agreed to have her code status reverted to DNR/DNI as it was previously.          Symptoms (0 - 10, Best  to Worst)  [place] Symptom Assessment System  0-10 (Numeric) Pain Score: 0 - No pain    BM in last 48 hours? no      Personal/Social History    She reports that she has been smoking cigarettes. She started smoking about 56 years ago. She has a 28 pack-year smoking history. She has never been exposed to tobacco smoke. She has never used smokeless tobacco. She reports that she does not currently use alcohol. She reports that she does not currently use drugs after having used the following drugs: Marijuana.    Functional Status          Past Medical History  She has a past medical history of Anal fissure (10/12/2023), Anemia, ASHD (arteriosclerotic heart disease), At risk for falls, Atrial fibrillation (Multi), CHF (congestive heart failure), CKD (chronic kidney disease), COPD (chronic obstructive pulmonary disease) (Multi), CVA (cerebral vascular accident) (Multi), Depression, Diabetes mellitus (Multi), GERD (gastroesophageal reflux disease), H/O pleural effusion, Hyperlipidemia, Hypertension, Hypothyroidism, Hypoxia, Impacted cerumen, left ear, Noncompliance, Osteoarthritis, Perianal dermatitis (10/12/2023), Personal history of other diseases of the respiratory system, PVD (peripheral vascular disease) (CMS-HCC), Renal carcinoma, right (Multi), Respiratory failure (Multi), TIA (transient ischemic attack), Vasculitis (CMS-HCC) (10/12/2023), and Weakness.    Surgical History  She has a past surgical history that includes MR angio chest w and wo IV contrast (2023); MR angio head wo IV contrast (2021);  section, classic; Shoulder surgery; Ankle surgery; Cataract extraction (Right); Nephrectomy (2021); and Cardiac catheterization (N/A, 2024).     Family History  Family History   Problem Relation Name Age of Onset    Hypertension Mother      Diabetes Father      Heart disease Father      Cancer Sister      Cancer Brother      Diabetes Daughter      Asthma Daughter      Heart attack Daughter       Diabetes Maternal Grandfather      Heart disease Paternal Grandmother      Diabetes Other      Hypertension Other      COPD Other      Other (chronic lung disease) Other       Allergies  Bee venom protein (honey bee); Citalopram; Codeine; Influenza virus vaccines; Latex; Latex, natural rubber; Lisinopril; Penicillins; Adhesive tape-silicones; Amoxicillin; Doxycycline; Oseltamivir; Phenyleph-min oil-petrolatum; Phenyleph-shark oil-glyc-pet; Phenylephrine; Prednisone; Tuberculin ppd; and Xylometazoline    Review of Systems     Physical Exam  Vitals and nursing note reviewed.   Constitutional:       General: She is not in acute distress.     Appearance: She is ill-appearing. She is not toxic-appearing.      Comments: Drowsy, somnolent.    HENT:      Head: Normocephalic and atraumatic.      Nose:      Comments: NC in situ  Eyes:      Extraocular Movements: Extraocular movements intact.      Conjunctiva/sclera: Conjunctivae normal.      Pupils: Pupils are equal, round, and reactive to light.   Cardiovascular:      Rate and Rhythm: Bradycardia present.      Heart sounds: Normal heart sounds.   Pulmonary:      Effort: No respiratory distress.      Comments: Diminished breath sounds. Poor effort.   Abdominal:      General: Abdomen is flat. Bowel sounds are normal.      Palpations: Abdomen is soft.   Musculoskeletal:         General: No swelling.   Skin:     General: Skin is warm and dry.   Neurological:      Mental Status: She is disoriented.      Comments: Oriented to self and setting. Able to give cursory history.    Psychiatric:      Comments: Lethargic.        Last Recorded Vitals  Blood pressure 122/60, pulse (!) 48, temperature 35.9 °C (96.6 °F), temperature source Temporal, resp. rate 18, weight 70.7 kg (155 lb 13.8 oz), SpO2 96%.    Relevant Results  Results for orders placed or performed during the hospital encounter of 12/31/24 (from the past 24 hours)   POCT GLUCOSE   Result Value Ref Range    POCT Glucose 82  74 - 99 mg/dL   POCT GLUCOSE   Result Value Ref Range    POCT Glucose 64 (L) 74 - 99 mg/dL   POCT GLUCOSE   Result Value Ref Range    POCT Glucose 97 74 - 99 mg/dL   POCT GLUCOSE   Result Value Ref Range    POCT Glucose 49 (L) 74 - 99 mg/dL   POCT GLUCOSE   Result Value Ref Range    POCT Glucose 206 (H) 74 - 99 mg/dL   POCT GLUCOSE   Result Value Ref Range    POCT Glucose <10 (L) 74 - 99 mg/dL   POCT GLUCOSE   Result Value Ref Range    POCT Glucose 10 (L) 74 - 99 mg/dL   Electrocardiogram, 12-lead PRN ACS symptoms   Result Value Ref Range    Ventricular Rate 61 BPM    Atrial Rate 61 BPM    QRS Duration 132 ms    QT Interval 456 ms    QTC Calculation(Bazett) 459 ms    R Axis -49 degrees    T Axis 90 degrees    QRS Count 10 beats    Q Onset 216 ms    P Onset 122 ms    P Offset 176 ms    T Offset 444 ms    QTC Fredericia 458 ms   POCT GLUCOSE   Result Value Ref Range    POCT Glucose 210 (H) 74 - 99 mg/dL   Blood Gas Venous Full Panel   Result Value Ref Range    POCT pH, Venous 7.30 (L) 7.33 - 7.43 pH    POCT pCO2, Venous 60 (H) 41 - 51 mm Hg    POCT pO2, Venous 53 (H) 35 - 45 mm Hg    POCT SO2, Venous 86 (H) 45 - 75 %    POCT Oxy Hemoglobin, Venous 84.7 (H) 45.0 - 75.0 %    POCT Hematocrit Calculated, Venous 35.0 (L) 36.0 - 46.0 %    POCT Sodium, Venous 125 (L) 136 - 145 mmol/L    POCT Potassium, Venous 4.0 3.5 - 5.3 mmol/L    POCT Chloride, Venous 91 (L) 98 - 107 mmol/L    POCT Ionized Calicum, Venous 1.05 (L) 1.10 - 1.33 mmol/L    POCT Glucose, Venous 127 (H) 74 - 99 mg/dL    POCT Lactate, Venous 0.8 0.4 - 2.0 mmol/L    POCT Base Excess, Venous 1.9 -2.0 - 3.0 mmol/L    POCT HCO3 Calculated, Venous 29.5 (H) 22.0 - 26.0 mmol/L    POCT Hemoglobin, Venous 11.8 (L) 12.0 - 16.0 g/dL    POCT Anion Gap, Venous 9.0 (L) 10.0 - 25.0 mmol/L    Patient Temperature 37.0 degrees Celsius    FiO2 32 %   POCT GLUCOSE   Result Value Ref Range    POCT Glucose 114 (H) 74 - 99 mg/dL   POCT GLUCOSE   Result Value Ref Range    POCT Glucose  68 (L) 74 - 99 mg/dL   POCT GLUCOSE   Result Value Ref Range    POCT Glucose 164 (H) 74 - 99 mg/dL   POCT GLUCOSE   Result Value Ref Range    POCT Glucose 133 (H) 74 - 99 mg/dL   POCT GLUCOSE   Result Value Ref Range    POCT Glucose 130 (H) 74 - 99 mg/dL   Basic Metabolic Panel   Result Value Ref Range    Glucose 111 (H) 74 - 99 mg/dL    Sodium 126 (L) 136 - 145 mmol/L    Potassium 5.3 3.5 - 5.3 mmol/L    Chloride 93 (L) 98 - 107 mmol/L    Bicarbonate 24 21 - 32 mmol/L    Anion Gap 14 10 - 20 mmol/L    Urea Nitrogen 38 (H) 6 - 23 mg/dL    Creatinine 4.10 (H) 0.50 - 1.05 mg/dL    eGFR 11 (L) >60 mL/min/1.73m*2    Calcium 6.9 (L) 8.6 - 10.3 mg/dL   CBC   Result Value Ref Range    WBC 7.8 4.4 - 11.3 x10*3/uL    nRBC 0.0 0.0 - 0.0 /100 WBCs    RBC 3.67 (L) 4.00 - 5.20 x10*6/uL    Hemoglobin 11.5 (L) 12.0 - 16.0 g/dL    Hematocrit 36.5 36.0 - 46.0 %     80 - 100 fL    MCH 31.3 26.0 - 34.0 pg    MCHC 31.5 (L) 32.0 - 36.0 g/dL    RDW 13.4 11.5 - 14.5 %    Platelets 163 150 - 450 x10*3/uL   Magnesium   Result Value Ref Range    Magnesium 2.33 1.60 - 2.40 mg/dL     *Note: Due to a large number of results and/or encounters for the requested time period, some results have not been displayed. A complete set of results can be found in Results Review.       US thoracentesis    Result Date: 1/3/2025  Uneventful thoracentesis, as detailed above: Right Pleural space, 1,000 mL   Performed and dictated at [facility].   Signed by: Danielle Hutson 1/3/2025 12:51 PM Dictation workstation:   [id number]    XR chest 1 view    Result Date: 1/1/2025  1. Cardiomegaly. Mild vascular congestion. Moderate right pleural effusion with atelectasis/consolidation at the right lung base.       MACRO: None.   Signed by: Beulah Chacon 1/1/2025 3:48 AM Dictation workstation:   [id number]    XR forearm right 2 views    Result Date: 12/31/2024  No acute osseous findings. Signed by Ashish Otero II, MD    CT  cervical spine wo IV contrast    Result Date: 12/31/2024  1.  No CT evidence of acute fracture. 2.  Degenerative changes throughout the cervical spine, as above.   Signed by: Vinh Bautista 12/31/2024 11:47 AM Dictation workstation:   [id number]    CT head wo IV contrast    Result Date: 12/31/2024  No evidence of acute cortical infarct or intracranial hemorrhage.   MACRO: None     Signed by: Vinh Bautista 12/31/2024 11:41 AM Dictation workstation:   [id number]     Scheduled medications  amiodarone, 200 mg, oral, Daily with breakfast  amLODIPine, 5 mg, oral, Daily  aspirin, 81 mg, oral, Daily  atorvastatin, 40 mg, oral, Daily  clopidogrel, 75 mg, oral, Daily  ferrous gluconate, 324 mg, oral, Daily with breakfast  heparin (porcine), 5,000 Units, subcutaneous, q8h SUMIT  [Held by provider] insulin glargine, 10 Units, subcutaneous, Nightly  insulin lispro, 0-10 Units, subcutaneous, Before meals & nightly  ipratropium-albuteroL, 3 mL, nebulization, TID  levothyroxine, 250 mcg, oral, Daily before breakfast  [Held by provider] metoprolol succinate XL, 50 mg, oral, BID  oxygen, , inhalation, Continuous - Inhalation  oxygen, , inhalation, Continuous - Inhalation  pantoprazole, 40 mg, oral, BID  venlafaxine XR, 150 mg, oral, Daily      Continuous medications  dextrose 10 % in water (D10W), 50 mL/hr, Last Rate: 50 mL/hr (01/06/25 0629)      PRN medications  PRN medications: acetaminophen **OR** acetaminophen **OR** acetaminophen, dextrose, dextrose, glucagon, glucagon, hydrOXYzine pamoate, ipratropium-albuteroL, ondansetron ODT **OR** ondansetron, oxygen    Assessment/Plan   IMP:  70 yo F with significant PMH including ESRD, likely nearing end stage COPD w/ CRF requiring 3LPM O2 at baseline, CAD, AF, CVA history who presented with AHRF and has been declining NIMV. She has previoulsy been DNR/DNI but this was reversed this admission for a procedure. She continues to have metabolic encephalopathy likely 2/2 CO2 retention in the  setting of COPD/CRF and recent influenza infection. She does not have capacity but can assent. HCPOA is her nephew Silver. Palliative was consulted for [facility], possible hospice.     Conversation with HCPOA, Silver:  She was supposed to transition to [facility]. [facility] in [place] accepted her. I recommended that he call them about intake and whether she would be accepted there with her change in functional status. He reports that she has had multiple falls the last year and a sharper decline since 12/2024.   He will try to call her to encourage BiPAP.  We discussed her delirium, likely 2/2 retained CO2 and that she again expressed to me she would not like to have CPR or intubation. In view of her multiple organ failure, we agreed that these two procedures would not fulfill her goals regarding minimal acceptable outcome and maximal burden of disease. He agreed to have her code status reverted to DNR/DNI as it was previously.     Plan:  DNR DNI today  We will continue encouraging BiPAP as this may improve her mentation enough to be more active in disposition planning. She may have significant improvement in mentation if this is r/t influenza worsening her respiratory function.   She may otherwise benefit from hospice. I have not broached this yet with HCPOA or patient but I have discussed her overall health and probable slow recovery, especially if she continues to decline therapies.          Provider estimate of survival: 1-6 months  Patient Prognostic Awareness: Yes - congruent with provider estimation    Patient/proxy preference for information  Prefers full information    Goals of Care  Full Code this admission, previously DNR DNI  DNR/DNI reverted on 1/6/2025 after conversation with patient and HCPOA    Is the patient hospice-eligible?   Yes  Was a discussion held re hospice services?   no  Was a decision made re hospice services?  Maybe      I spent 50 minutes in the professional and overall care of this patient.  I spent  20 minutes in the professional and overall care of this patient related to ACP in addition to other time spent in assessment, chart review, and coordination of care        Lam Noe MD

## 2025-01-06 NOTE — NURSING NOTE
Stroke Alert called for Pt with change in mental status.  BG was found to be 10.  Stroke alert was changed to Rapid Response.  Pt transferred to SDU

## 2025-01-06 NOTE — PROGRESS NOTES
01/06/25 1314   Discharge Planning   Expected Discharge Disposition SNF          Patient had rapid response during night for low blood sugar and is on D10 infusion. Patient got auth to admit to [facility] when medically cleared.

## 2025-01-07 VITALS
TEMPERATURE: 97.9 F | OXYGEN SATURATION: 96 % | DIASTOLIC BLOOD PRESSURE: 75 MMHG | WEIGHT: 155.87 LBS | SYSTOLIC BLOOD PRESSURE: 144 MMHG | BODY MASS INDEX: 25.16 KG/M2 | RESPIRATION RATE: 18 BRPM | HEART RATE: 76 BPM

## 2025-01-07 LAB
ANION GAP SERPL CALC-SCNC: 11 MMOL/L (ref 10–20)
BASOPHILS # BLD AUTO: 0.01 X10*3/UL (ref 0–0.1)
BASOPHILS NFR BLD AUTO: 0.1 %
BUN SERPL-MCNC: 25 MG/DL (ref 6–23)
CALCIUM SERPL-MCNC: 7.1 MG/DL (ref 8.6–10.3)
CHLORIDE SERPL-SCNC: 89 MMOL/L (ref 98–107)
CO2 SERPL-SCNC: 31 MMOL/L (ref 21–32)
CREAT SERPL-MCNC: 3.02 MG/DL (ref 0.5–1.05)
EGFRCR SERPLBLD CKD-EPI 2021: 16 ML/MIN/1.73M*2
EOSINOPHIL # BLD AUTO: 0.12 X10*3/UL (ref 0–0.7)
EOSINOPHIL NFR BLD AUTO: 1.5 %
ERYTHROCYTE [DISTWIDTH] IN BLOOD BY AUTOMATED COUNT: 13.5 % (ref 11.5–14.5)
GLUCOSE BLD MANUAL STRIP-MCNC: 121 MG/DL (ref 74–99)
GLUCOSE BLD MANUAL STRIP-MCNC: 65 MG/DL (ref 74–99)
GLUCOSE BLD MANUAL STRIP-MCNC: 67 MG/DL (ref 74–99)
GLUCOSE BLD MANUAL STRIP-MCNC: 76 MG/DL (ref 74–99)
GLUCOSE BLD MANUAL STRIP-MCNC: 77 MG/DL (ref 74–99)
GLUCOSE BLD MANUAL STRIP-MCNC: 93 MG/DL (ref 74–99)
GLUCOSE BLD MANUAL STRIP-MCNC: 94 MG/DL (ref 74–99)
GLUCOSE SERPL-MCNC: 69 MG/DL (ref 74–99)
HCT VFR BLD AUTO: 33.9 % (ref 36–46)
HGB BLD-MCNC: 10.9 G/DL (ref 12–16)
IMM GRANULOCYTES # BLD AUTO: 0.04 X10*3/UL (ref 0–0.7)
IMM GRANULOCYTES NFR BLD AUTO: 0.5 % (ref 0–0.9)
LABORATORY COMMENT REPORT: NORMAL
LABORATORY COMMENT REPORT: NORMAL
LYMPHOCYTES # BLD AUTO: 0.59 X10*3/UL (ref 1.2–4.8)
LYMPHOCYTES NFR BLD AUTO: 7.2 %
MCH RBC QN AUTO: 31.5 PG (ref 26–34)
MCHC RBC AUTO-ENTMCNC: 32.2 G/DL (ref 32–36)
MCV RBC AUTO: 98 FL (ref 80–100)
MONOCYTES # BLD AUTO: 0.43 X10*3/UL (ref 0.1–1)
MONOCYTES NFR BLD AUTO: 5.3 %
NEUTROPHILS # BLD AUTO: 6.99 X10*3/UL (ref 1.2–7.7)
NEUTROPHILS NFR BLD AUTO: 85.4 %
NRBC BLD-RTO: 0 /100 WBCS (ref 0–0)
P OFFSET: 156 MS
P ONSET: 136 MS
PATH REPORT.FINAL DX SPEC: NORMAL
PATH REPORT.GROSS SPEC: NORMAL
PATH REPORT.RELEVANT HX SPEC: NORMAL
PATH REPORT.TOTAL CANCER: NORMAL
PLATELET # BLD AUTO: 151 X10*3/UL (ref 150–450)
POTASSIUM SERPL-SCNC: 4.5 MMOL/L (ref 3.5–5.3)
Q ONSET: 217 MS
QRS COUNT: 12 BEATS
QRS DURATION: 122 MS
QT INTERVAL: 436 MS
QTC CALCULATION(BAZETT): 486 MS
QTC FREDERICIA: 469 MS
R AXIS: -50 DEGREES
RBC # BLD AUTO: 3.46 X10*6/UL (ref 4–5.2)
SODIUM SERPL-SCNC: 126 MMOL/L (ref 136–145)
T AXIS: 116 DEGREES
T OFFSET: 435 MS
VENTRICULAR RATE: 75 BPM
WBC # BLD AUTO: 8.2 X10*3/UL (ref 4.4–11.3)

## 2025-01-07 PROCEDURE — 2500000005 HC RX 250 GENERAL PHARMACY W/O HCPCS: Performed by: HOSPITALIST

## 2025-01-07 PROCEDURE — 85025 COMPLETE CBC W/AUTO DIFF WBC: CPT | Performed by: INTERNAL MEDICINE

## 2025-01-07 PROCEDURE — 94640 AIRWAY INHALATION TREATMENT: CPT

## 2025-01-07 PROCEDURE — 82947 ASSAY GLUCOSE BLOOD QUANT: CPT

## 2025-01-07 PROCEDURE — 2500000005 HC RX 250 GENERAL PHARMACY W/O HCPCS: Performed by: INTERNAL MEDICINE

## 2025-01-07 PROCEDURE — 2500000002 HC RX 250 W HCPCS SELF ADMINISTERED DRUGS (ALT 637 FOR MEDICARE OP, ALT 636 FOR OP/ED): Performed by: HOSPITALIST

## 2025-01-07 PROCEDURE — 97535 SELF CARE MNGMENT TRAINING: CPT | Mod: GO

## 2025-01-07 PROCEDURE — 36415 COLL VENOUS BLD VENIPUNCTURE: CPT | Performed by: INTERNAL MEDICINE

## 2025-01-07 PROCEDURE — 2500000004 HC RX 250 GENERAL PHARMACY W/ HCPCS (ALT 636 FOR OP/ED): Performed by: HOSPITALIST

## 2025-01-07 PROCEDURE — 99233 SBSQ HOSP IP/OBS HIGH 50: CPT | Performed by: INTERNAL MEDICINE

## 2025-01-07 PROCEDURE — 99232 SBSQ HOSP IP/OBS MODERATE 35: CPT | Performed by: INTERNAL MEDICINE

## 2025-01-07 PROCEDURE — 80048 BASIC METABOLIC PNL TOTAL CA: CPT | Performed by: INTERNAL MEDICINE

## 2025-01-07 PROCEDURE — 99239 HOSP IP/OBS DSCHRG MGMT >30: CPT | Performed by: INTERNAL MEDICINE

## 2025-01-07 PROCEDURE — 2500000001 HC RX 250 WO HCPCS SELF ADMINISTERED DRUGS (ALT 637 FOR MEDICARE OP): Performed by: HOSPITALIST

## 2025-01-07 RX ADMIN — DEXTROSE MONOHYDRATE 12.5 G: 25 INJECTION, SOLUTION INTRAVENOUS at 01:46

## 2025-01-07 RX ADMIN — VENLAFAXINE HYDROCHLORIDE 150 MG: 75 CAPSULE, EXTENDED RELEASE ORAL at 09:22

## 2025-01-07 RX ADMIN — PANTOPRAZOLE SODIUM 40 MG: 40 TABLET, DELAYED RELEASE ORAL at 09:22

## 2025-01-07 RX ADMIN — HEPARIN SODIUM 5000 UNITS: 5000 INJECTION, SOLUTION INTRAVENOUS; SUBCUTANEOUS at 16:00

## 2025-01-07 RX ADMIN — PANTOPRAZOLE SODIUM 40 MG: 40 TABLET, DELAYED RELEASE ORAL at 21:51

## 2025-01-07 RX ADMIN — Medication 3 L/MIN: at 08:00

## 2025-01-07 RX ADMIN — CLOPIDOGREL 75 MG: 75 TABLET ORAL at 09:22

## 2025-01-07 RX ADMIN — ASPIRIN 81 MG: 81 TABLET, COATED ORAL at 09:23

## 2025-01-07 RX ADMIN — IPRATROPIUM BROMIDE AND ALBUTEROL SULFATE 3 ML: 2.5; .5 SOLUTION RESPIRATORY (INHALATION) at 07:22

## 2025-01-07 RX ADMIN — HEPARIN SODIUM 5000 UNITS: 5000 INJECTION, SOLUTION INTRAVENOUS; SUBCUTANEOUS at 21:51

## 2025-01-07 RX ADMIN — ATORVASTATIN CALCIUM 40 MG: 40 TABLET, FILM COATED ORAL at 09:22

## 2025-01-07 RX ADMIN — IPRATROPIUM BROMIDE AND ALBUTEROL SULFATE 3 ML: 2.5; .5 SOLUTION RESPIRATORY (INHALATION) at 19:07

## 2025-01-07 RX ADMIN — HEPARIN SODIUM 5000 UNITS: 5000 INJECTION, SOLUTION INTRAVENOUS; SUBCUTANEOUS at 06:14

## 2025-01-07 RX ADMIN — Medication 3 L/MIN: at 19:07

## 2025-01-07 RX ADMIN — FERROUS GLUCONATE 324 MG: 324 TABLET ORAL at 10:20

## 2025-01-07 RX ADMIN — AMIODARONE HYDROCHLORIDE 200 MG: 200 TABLET ORAL at 09:22

## 2025-01-07 RX ADMIN — LEVOTHYROXINE SODIUM 250 MCG: 0.12 TABLET ORAL at 09:22

## 2025-01-07 RX ADMIN — AMLODIPINE BESYLATE 5 MG: 5 TABLET ORAL at 09:22

## 2025-01-07 RX ADMIN — IPRATROPIUM BROMIDE AND ALBUTEROL SULFATE 3 ML: 2.5; .5 SOLUTION RESPIRATORY (INHALATION) at 14:00

## 2025-01-07 ASSESSMENT — PAIN SCALES - GENERAL
PAINLEVEL_OUTOF10: 0 - NO PAIN

## 2025-01-07 ASSESSMENT — COGNITIVE AND FUNCTIONAL STATUS - GENERAL
TURNING FROM BACK TO SIDE WHILE IN FLAT BAD: A LOT
WALKING IN HOSPITAL ROOM: A LITTLE
DAILY ACTIVITIY SCORE: 17
HELP NEEDED FOR BATHING: A LITTLE
MOVING FROM LYING ON BACK TO SITTING ON SIDE OF FLAT BED WITH BEDRAILS: A LITTLE
PERSONAL GROOMING: A LITTLE
MOBILITY SCORE: 16
DRESSING REGULAR LOWER BODY CLOTHING: A LOT
CLIMB 3 TO 5 STEPS WITH RAILING: A LOT
TOILETING: A LITTLE
STANDING UP FROM CHAIR USING ARMS: A LITTLE
HELP NEEDED FOR BATHING: A LOT
DAILY ACTIVITIY SCORE: 13
TOILETING: A LOT
MOVING TO AND FROM BED TO CHAIR: A LITTLE
DRESSING REGULAR LOWER BODY CLOTHING: A LOT
PERSONAL GROOMING: A LOT
EATING MEALS: A LITTLE
DRESSING REGULAR UPPER BODY CLOTHING: A LOT
DRESSING REGULAR UPPER BODY CLOTHING: A LOT

## 2025-01-07 ASSESSMENT — PAIN - FUNCTIONAL ASSESSMENT
PAIN_FUNCTIONAL_ASSESSMENT: 0-10

## 2025-01-07 ASSESSMENT — ACTIVITIES OF DAILY LIVING (ADL): HOME_MANAGEMENT_TIME_ENTRY: 14

## 2025-01-07 NOTE — PROGRESS NOTES
"Occupational Therapy    Occupational Therapy Treatment    Name: Daphne Morris  MRN: [id number]  : 1955  Date: 25  Room: [address] [place] [address]      Time Calculation  Start Time: 1245  Stop Time: 1259  Time Calculation (min): 14 min    Assessment:  OT Assessment: Pt only agreeable to bed level ADLs this date due to \"feeling too full\" from lunch. Pt requiring increased cues for sequencing through task and for initiaion of tasks. Continue to recommend mod intensity therapies at discharge to maximize function.  Prognosis: Good  Barriers to Discharge Home: Physical needs  Physical Needs: 24hr mobility assistance needed, High falls risk due to function or environment, Intermittent ADL assistance needed  Evaluation/Treatment Tolerance: Patient tolerated treatment well  Medical Staff Made Aware: Yes  End of Session Communication: Bedside nurse  End of Session Patient Position: Bed, 3 rail up, Alarm on  Plan:  Treatment Interventions: ADL retraining, Functional transfer training, UE strengthening/ROM, Endurance training, Equipment evaluation/education  OT Frequency: 3 times per week  OT Discharge Recommendations: Moderate intensity level of continued care  Equipment Recommended upon Discharge: Wheeled walker (pt owns recommended equipment)  OT Recommended Transfer Status: Minimal assist  OT - OK to Discharge: Yes    Subjective   General:  OT Last Visit  OT Received On: 25  Reason for Referral: 68 y/o F found on floor (possibly for 16 hours); found to have Influenza A and admitted with acute hypercapneic respiratory failure, hyponatremia, metabolic encephalopathy d/t the aforemntioned as well as hypercapnea.  Past Medical History Relevant to Rehab: ESRD, dialysis dependent for about a year.  sat schedule  Hypertension  CAD/PVD  Renal cell cancer  Hyperlipidemia  Diabetes  Atrial fib  COPD, on 3 liters, mostly at night  Hypothyroid  Depression  Visual impairment  Anemia  CVA, multiple. Right sided " "weakness  Prior to Session Communication: Bedside nurse  Patient Position Received: Bed, 3 rail up, Alarm on  Family/Caregiver Present: No  General Comment: pt supine in bed upon entry, agreeable to therapy. pt only agreeable to bed level 2/2 \"feeling too full\" from lunch.   Precautions:  Hearing/Visual Limitations: [place]  Medical Precautions: Fall precautions, Oxygen therapy device and L/min      Lines/Tubes/Drains:  External Urinary Catheter Female (Active)   Number of days: 4       Cognition:  Overall Cognitive Status: Within Functional Limits  Orientation Level: Oriented X4    Pain Assessment:  Pain Assessment  Pain Assessment: 0-10  0-10 (Numeric) Pain Score: 0 - No pain     Objective   Activities of Daily Living:   Feeding  Feeding Level of Assistance: Setup  Feeding Where Assessed: Bed level  Feeding Comments: pt requiring set-up for beverage management  Grooming  Grooming Level of Assistance: Setup, Minimal verbal cues  Grooming Where Assessed: Bed level  Grooming Comments: VC for sequencing through task to wash face        UE Dressing  UE Dressing Level of Assistance: Moderate assistance  UE Dressing Where Assessed: Bed level  UE Dressing Comments: pt requiring VC, TC and physical assist to thread BUE into sleeves. increased time needed and cues for sequencing           Bed Mobility/Transfers:   Bed Mobility  Bed Mobility: No (pt declined)                    Outcome Measures:  [facility] Daily Activity  Putting on and taking off regular lower body clothing: A lot  Bathing (including washing, rinsing, drying): A little  Putting on and taking off regular upper body clothing: A lot  Toileting, which includes using toilet, bedpan or urinal: A little  Taking care of personal grooming such as brushing teeth: A little  Eating Meals: None  Daily Activity - Total Score: 17     Education Documentation  Body Mechanics, taught by Nina Werner OT at 1/7/2025  1:20 PM.  Learner: Patient  Readiness: Acceptance  Method: " Explanation  Response: Needs Reinforcement  Comment: importance of OOB mobility or even sitting EOB, sequencing through ADLs for completion of task with increased independence    Precautions, taught by Nina Werner OT at 1/7/2025  1:20 PM.  Learner: Patient  Readiness: Acceptance  Method: Explanation  Response: Needs Reinforcement  Comment: importance of OOB mobility or even sitting EOB, sequencing through ADLs for completion of task with increased independence    ADL Training, taught by Nina Werner OT at 1/7/2025  1:20 PM.  Learner: Patient  Readiness: Acceptance  Method: Explanation  Response: Needs Reinforcement  Comment: importance of OOB mobility or even sitting EOB, sequencing through ADLs for completion of task with increased independence    Education Comments  No comments found.        Goals:  Encounter Problems       Encounter Problems (Active)       ADLs       Patient will perform UB and LB bathing  with modified independent level of assistance and shower chair. (Progressing)       Start:  01/03/25    Expected End:  01/17/25            Patient with complete lower body dressing with modified independent level of assistance donning and doffing all LE clothes  with no adaptive equipment while supported sitting (Progressing)       Start:  01/03/25    Expected End:  01/17/25            Patient will complete daily grooming tasks brushing teeth and washing face/hair with modified independent level of assistance and PRN adaptive equipment while standing. (Progressing)       Start:  01/03/25    Expected End:  01/17/25            Patient will complete toileting including hygiene clothing management/hygiene with modified independent level of assistance and grab bars. (Progressing)       Start:  01/03/25    Expected End:  01/17/25               MOBILITY       Patient will perform Functional mobility min Household distances/Community Distances with modified independent level of assistance and least restrictive  device in order to improve safety and functional mobility. (Progressing)       Start:  01/03/25    Expected End:  01/17/25               TRANSFERS       Patient will complete functional transfer to with least restrictive device with modified independent level of assistance. (Progressing)       Start:  01/03/25    Expected End:  01/17/25 01/07/25 at 1:21 PM   JOEY BETTS, OT

## 2025-01-07 NOTE — PROGRESS NOTES
Daphne Morris is a 69 y.o. female on day 7 of admission presenting with Influenza.      Subjective   Seen and examined on stepdown.  Awake, eating breakfast this morning. Conversational.   Off dextrose infusion.          Objective          Vitals 24HR  Heart Rate:  [54-66]   Temp:  [35.8 °C (96.5 °F)-36.7 °C (98.1 °F)]   Resp:  [16-19]   BP: (120-145)/(59-81)   SpO2:  [93 %-100 %]     Intake/Output last 3 Shifts:    Intake/Output Summary (Last 24 hours) at 1/7/2025 1032  Last data filed at 1/7/2025 0635  Gross per 24 hour   Intake 1899.66 ml   Output 900 ml   Net 999.66 ml       Physical Exam  Constitutional:       Appearance: Elderly appearing female. In no acute distress.   HENT:      Head: Normocephalic and atraumatic.      Mouth: Mucous membranes are moist.   Eyes:      Extraocular Movements: Extraocular movements intact.      Conjunctiva/sclera: Conjunctivae normal.      Pupils: Pupils are equal, round, and reactive to light.   Cardiovascular:      Rate and Rhythm: Normal rate and regular rhythm.      Pulses: Normal pulses.      Heart sounds: Normal heart sounds.   Pulmonary:      Effort: Pulmonary effort is normal.     Breath sounds: Normal breath sounds.   Abdominal:      General: Abdomen is flat. Bowel sounds are normal.      Palpations: Abdomen is soft.      Comments: Non-tender, non-distended; BS present   Musculoskeletal:         Comments: Fistula RUE, positive thrill and bruit     Trace bilateral leg edema.  No joint swelling.  Skin:     General: Skin is warm and dry.   Neurological:      Mental Status: Cranial nerves II through XII grossly intact.   Psychiatric:      Comments: Appropriate mood. Confused.     Scheduled Medications  amiodarone, 200 mg, oral, Daily with breakfast  amLODIPine, 5 mg, oral, Daily  aspirin, 81 mg, oral, Daily  atorvastatin, 40 mg, oral, Daily  clopidogrel, 75 mg, oral, Daily  ferrous gluconate, 324 mg, oral, Daily with breakfast  heparin (porcine), 5,000 Units, subcutaneous,  q8h [facility]  [Held by provider] insulin glargine, 10 Units, subcutaneous, Nightly  insulin lispro, 0-10 Units, subcutaneous, Before meals & nightly  ipratropium-albuteroL, 3 mL, nebulization, TID  levothyroxine, 250 mcg, oral, Daily before breakfast  [Held by provider] metoprolol succinate XL, 50 mg, oral, BID  oxygen, , inhalation, Continuous - Inhalation  oxygen, , inhalation, Continuous - Inhalation  pantoprazole, 40 mg, oral, BID  venlafaxine XR, 150 mg, oral, Daily      Continuous medications         PRN medications: acetaminophen **OR** acetaminophen **OR** acetaminophen, dextrose, dextrose, glucagon, glucagon, hydrOXYzine pamoate, ipratropium-albuteroL, ondansetron ODT **OR** ondansetron, oxygen     Relevant Results  Results from last 7 days   Lab Units 01/07/25  0720 01/06/25  0736 01/05/25  0555 01/02/25  0500 01/01/25  0058 12/31/24  1730   WBC AUTO x10*3/uL 8.2 7.8 7.7   < > 5.6 6.3   HEMOGLOBIN g/dL 10.9* 11.5* 11.6*   < > 10.8* 11.2*   HEMATOCRIT % 33.9* 36.5 37.8   < > 33.9* 34.9*   PLATELETS AUTO x10*3/uL 151 163 178   < > 185 202   NEUTROS PCT AUTO % 85.4  --   --   --  80.2 79.4   LYMPHS PCT AUTO % 7.2  --   --   --  8.0 7.0   MONOS PCT AUTO % 5.3  --   --   --  10.5 11.4   EOS PCT AUTO % 1.5  --   --   --  0.0 0.9    < > = values in this interval not displayed.     Results from last 7 days   Lab Units 01/07/25  0720 01/06/25  0736 01/05/25  0555   SODIUM mmol/L 126* 126* 131*   POTASSIUM mmol/L 4.5 5.3 4.2   CHLORIDE mmol/L 89* 93* 94*   CO2 mmol/L 31 24 27   BUN mg/dL 25* 38* 32*   CREATININE mg/dL 3.02* 4.10* 3.49*   GLUCOSE mg/dL 69* 111* 87   CALCIUM mg/dL 7.1* 6.9* 7.6*       US thoracentesis   Final Result   Uneventful thoracentesis, as detailed above: Right Pleural space,   1,000 mL        Performed and dictated at [facility].        Signed by: Danielle Hutson 1/3/2025 12:51 PM   Dictation workstation:   [id number]      XR chest 1 view   Final Result   1.  Cardiomegaly. Mild vascular congestion. Moderate right pleural   effusion with atelectasis/consolidation at the right lung base.                  MACRO:   None.        Signed by: Beulah Chacon 1/1/2025 3:48 AM   Dictation workstation:   [id number]               Assessment/Plan   This patient currently has cardiac telemetry ordered; if you would like to modify or discontinue the telemetry order, click here to go to the orders activity to modify/discontinue the order.    Daphne Morris is a 69 y.o. female with a past medical history of ESRD on hemodialysis via right arm aVF, hypertension, coronary artery disease, COPD on 3 LPM's nightly, hypothyroidism, CVA with right sided residual weakness, atrial fibrillation, HFpEF, moderate pericardial effusion who was recently discharged from [facility] for syncope with collapse on 12/28. At discharge, she was on 5 L nasal cannula.  Reportedly she could not get her oxygen to work at home thus she presented to the ED the following day with shortness of breath.  She comes in now as a transfer from [facility] after being found down.  She was noted to be hypercapnic by ABG with a pH of 7.16, pCO2 of 76.  She was influenza A positive.  Nephrology is consulted for ESRD care.  She required a right thoracentesis with 1 L removed on 1/3.  She was placed on a continuous dextrose infusion due to hypoglycemia with worsening hyponatremia noted. We cut her dextrose infusion down and we dialyzed her yesterday to buffer against worsening hyponatremia. She is now off the dextrose infusion, is on a 1 L FWR. Her sodium has stabilized at 126. She is asymptomatic. I will plan for dialysis again tomorrow. Otherwise, palliative is following to establish goals of care. Nephrology will follow with her care.      Assessment & Plan  Influenza    Acute on chronic respiratory failure with hypoxia and hypercapnia (Multi)    Recurrent pleural effusion      I spent 40 minutes in the professional and overall  care of this patient.      Kalpesh Urena, DO

## 2025-01-07 NOTE — DISCHARGE SUMMARY
"Discharge Diagnosis  Diabetes with hypoglycemic episodes, discontinued Lantus  Chronic hypoxic respiratory failure with 3 L of nasal cannula oxygen chronically, with acute hypercapnic respiratory failure with recurrent right-sided pleural effusion and influenza A status post right-sided thoracentesis with 1 L removal of pleural fluid  Delirium secondary to acute hypercapnic respiratory failure, resolved  End Stage Renal Disease on hemodialysis    Issues Requiring Follow-Up  Pcp, palliative care    Discharge Meds     Medication List      CHANGE how you take these medications     HumaLOG KwikPen Insulin 100 unit/mL injection; Generic drug: insulin   lispro; up to 15 units Subcutaneous three times a day per sliding scaleup   to 15 units Subcutaneous three times a day per sliding scale; What   changed: additional instructions     CONTINUE taking these medications     acetaminophen 500 mg tablet; Commonly known as: Tylenol   albuterol 90 mcg/actuation inhaler; INHALE 2 PUFFS BY MOUTH EVERY 4   HOURS AS NEEDED   amiodarone 200 mg tablet; Commonly known as: Pacerone; TAKE 1 TABLET BY   MOUTH ONCE DAILY WITH BREAKFAST.   amLODIPine 5 mg tablet; Commonly known as: Norvasc   aspirin 81 mg EC tablet   atorvastatin 40 mg tablet; Commonly known as: Lipitor; TAKE ONE TABLET   BY MOUTH EVERY DAY   BD Calista 2nd Gen Pen Needle 32 gauge x 5/32\" needle; Generic drug: pen   needle, diabetic; USE SUBCUTANEOUSLY AS DIRECTED TWICE DAILY   clopidogrel 75 mg tablet; Commonly known as: Plavix   ferrous gluconate 324 (38 Fe) mg tablet; Commonly known as: Fergon; Take   1 tablet (324 mg) by mouth once daily with breakfast.   FreeStyle Shakir 14 Day Sensor kit; Generic drug: flash glucose sensor   kit; USE AS DIRECTED TO CHECK SUGARS 3 TO 4 TIMES DAILY   FreeStyle Shakir 2 [place] misc; Generic drug: flash glucose scanning   reader; Use as instructed   FreeStyle Shakir 3 [place] misc; Generic drug: blood-glucose   meter,continuous; Use as " instructed   FreeStyle Shakir 3 Sensor device; Generic drug: blood-glucose sensor;   Change sensor Q 14 days   ipratropium-albuteroL 0.5-2.5 mg/3 mL nebulizer solution; Commonly known   as: Duo-Neb; INHALE THE CONTENTS OF 1 VIAL VIA NEBULIZER EVERY 6 HOURS AS   NEEDED.   * levothyroxine 50 mcg tablet; Commonly known as: Synthroid, Levoxyl;   Take 1 tablet (50 mcg) by mouth once daily in the morning. Take before   meals.   * levothyroxine 200 mcg tablet; Commonly known as: Synthroid, Levoxyl;   Take 1 tablet by mouth once daily in the morning before a meal.   metoprolol succinate XL 50 mg 24 hr tablet; Commonly known as:   Toprol-XL; TAKE ONE TABLET BY MOUTH TWO TIMES A DAY   oxygen DME/Hospice therapy; Commonly known as: O2   pantoprazole 40 mg EC tablet; Commonly known as: ProtoNix; TAKE ONE   TABLET BY MOUTH TWO TIMES A DAY   venlafaxine XR 75 mg 24 hr capsule; Commonly known as: Effexor-XR; Take   2 capsules (150 mg) by mouth once daily.  * This list has 2 medication(s) that are the same as other medications   prescribed for you. Read the directions carefully, and ask your doctor or   other care provider to review them with you.     STOP taking these medications     Lantus Solostar U-100 Insulin 100 unit/mL (3 mL) pen; Generic drug:   insulin glargine       Test Results Pending At Discharge  Pending Labs       No current pending labs.            Hospital Course   Patient is a 69-year-old female scented to the hospital at [facility] on December 26 with dyspnea and syncope.  She was transferred to [facility] due to acute hypercapnic respiratory failure.  Patient was also found to have influenza A recurrent pleural effusion.  Pulmonary was consulted along with palliative care.  Patient was refusing noninvasive ventilation, but her hypercapnia did improve patient also had delirium secondary to hypercapnia which also resolved.  In regards to recurrent right pleural effusion she had a thoracentesis performed on  the right had 1 L of fluid drained.  If this reaccumulate's Pleurx cath can be discussed as an outpatient with palliative care.  Patient did have episodes of hypoglycemia therefore Lantus was discontinued continue with sliding scale insulin may need to be reinstituted depending on patient's blood glucose as an outpatient.    Time spent more than 30 minutes on discharge      Pertinent Physical Exam At Time of Discharge  Physical Exam  Vitals reviewed.   Constitutional:       Appearance: Normal appearance.   HENT:      Head: Normocephalic.      Right Ear: Tympanic membrane normal.      Nose: Nose normal.      Mouth/Throat:      Mouth: Mucous membranes are moist.   Cardiovascular:      Rate and Rhythm: Normal rate.   Pulmonary:      Effort: Pulmonary effort is normal.   Abdominal:      General: Abdomen is flat. Bowel sounds are normal.      Palpations: Abdomen is soft.   Skin:     Capillary Refill: Capillary refill takes less than 2 seconds.   Neurological:      General: No focal deficit present.      Mental Status: She is alert.         Outpatient Follow-Up  No future appointments.      Felicity Krishna MD

## 2025-01-07 NOTE — PROGRESS NOTES
Daphne Morris is a 69 y.o. female on day 7 of admission presenting with Influenza.    Subjective   Symptoms (0 - 10, Best to Worst)  [place] Symptom Assessment System  0-10 (Numeric) Pain Score: 0 - No pain  Patient reports feeling better today. Not drowsy, denies respiratory complaints. She DID use NIMV overnight after speaking with myself and nephew Silver. Wants to get out of bed and urinate on her own. DC planned for today to SNF.     1/7/2025 Conversation with nephew Silver:   He had spoken to her in person yesterday and she agreed to try CPAP. She thought it was more akin to intubation.   [facility] Lantern: Nephew did call [facility], no [facility] from [facility] yet. Goal was for her to transition there after SNF.          Objective     Physical Exam  Constitutional:       General: She is not in acute distress.     Appearance: She is ill-appearing. She is not toxic-appearing.      Comments: Alert.    HENT:      Head: Normocephalic and atraumatic.      Nose:      Comments: NC in situ  Eyes:      Extraocular Movements: Extraocular movements intact.      Conjunctiva/sclera: Conjunctivae normal.      Pupils: Pupils are equal, round, and reactive to light.   Cardiovascular:      Rate and Rhythm: Bradycardia present.      Heart sounds: Normal heart sounds.   Pulmonary:      Effort: No respiratory distress.      Comments: Diminished breath sounds. Poor effort.   Abdominal:      General: Abdomen is flat. Bowel sounds are normal.      Palpations: Abdomen is soft.   Musculoskeletal:         General: No swelling.   Skin:     General: Skin is warm and dry.   Neurological:      Mental Status: She is oriented.       Comments: Oriented to self and setting. Able to give cursory history.    Psychiatric:      Comments: Lethargic.      Last Recorded Vitals  Blood pressure 121/64, pulse 66, temperature 36.7 °C (98.1 °F), resp. rate 19, weight 70.7 kg (155 lb 13.8 oz), SpO2 94%.  Intake/Output last 3 Shifts:  I/O last 3 completed shifts:  In: 2099.7  (29.7 mL/kg) [I.V.:1699.7 (24 mL/kg); Other:400]  Out: 900 (12.7 mL/kg) [Other:900]  Weight: 70.7 kg     Relevant Results    Results for orders placed or performed during the hospital encounter of 12/31/24 (from the past 24 hours)   POCT GLUCOSE   Result Value Ref Range    POCT Glucose 97 74 - 99 mg/dL   POCT GLUCOSE   Result Value Ref Range    POCT Glucose 88 74 - 99 mg/dL   POCT GLUCOSE   Result Value Ref Range    POCT Glucose 89 74 - 99 mg/dL   POCT GLUCOSE   Result Value Ref Range    POCT Glucose 65 (L) 74 - 99 mg/dL   POCT GLUCOSE   Result Value Ref Range    POCT Glucose 121 (H) 74 - 99 mg/dL   POCT GLUCOSE   Result Value Ref Range    POCT Glucose 93 74 - 99 mg/dL   CBC and Auto Differential   Result Value Ref Range    WBC 8.2 4.4 - 11.3 x10*3/uL    nRBC 0.0 0.0 - 0.0 /100 WBCs    RBC 3.46 (L) 4.00 - 5.20 x10*6/uL    Hemoglobin 10.9 (L) 12.0 - 16.0 g/dL    Hematocrit 33.9 (L) 36.0 - 46.0 %    MCV 98 80 - 100 fL    MCH 31.5 26.0 - 34.0 pg    MCHC 32.2 32.0 - 36.0 g/dL    RDW 13.5 11.5 - 14.5 %    Platelets 151 150 - 450 x10*3/uL    Neutrophils % 85.4 40.0 - 80.0 %    Immature Granulocytes %, Automated 0.5 0.0 - 0.9 %    Lymphocytes % 7.2 13.0 - 44.0 %    Monocytes % 5.3 2.0 - 10.0 %    Eosinophils % 1.5 0.0 - 6.0 %    Basophils % 0.1 0.0 - 2.0 %    Neutrophils Absolute 6.99 1.20 - 7.70 x10*3/uL    Immature Granulocytes Absolute, Automated 0.04 0.00 - 0.70 x10*3/uL    Lymphocytes Absolute 0.59 (L) 1.20 - 4.80 x10*3/uL    Monocytes Absolute 0.43 0.10 - 1.00 x10*3/uL    Eosinophils Absolute 0.12 0.00 - 0.70 x10*3/uL    Basophils Absolute 0.01 0.00 - 0.10 x10*3/uL   Basic metabolic panel   Result Value Ref Range    Glucose 69 (L) 74 - 99 mg/dL    Sodium 126 (L) 136 - 145 mmol/L    Potassium 4.5 3.5 - 5.3 mmol/L    Chloride 89 (L) 98 - 107 mmol/L    Bicarbonate 31 21 - 32 mmol/L    Anion Gap 11 10 - 20 mmol/L    Urea Nitrogen 25 (H) 6 - 23 mg/dL    Creatinine 3.02 (H) 0.50 - 1.05 mg/dL    eGFR 16 (L) >60  mL/min/1.73m*2    Calcium 7.1 (L) 8.6 - 10.3 mg/dL   POCT GLUCOSE   Result Value Ref Range    POCT Glucose 76 74 - 99 mg/dL   POCT GLUCOSE   Result Value Ref Range    POCT Glucose 77 74 - 99 mg/dL     *Note: Due to a large number of results and/or encounters for the requested time period, some results have not been displayed. A complete set of results can be found in Results Review.     US thoracentesis    Result Date: 1/3/2025  Uneventful thoracentesis, as detailed above: Right Pleural space, 1,000 mL   Performed and dictated at [facility] [place].   Signed by: Danielel Hutson 1/3/2025 12:51 PM Dictation workstation:   [id number]    XR chest 1 view    Result Date: 1/1/2025  1. Cardiomegaly. Mild vascular congestion. Moderate right pleural effusion with atelectasis/consolidation at the right lung base.       MACRO: None.   Signed by: Beulah Chacon 1/1/2025 3:48 AM Dictation workstation:   [id number]    XR forearm right 2 views    Result Date: 12/31/2024  No acute osseous findings. Signed by Ashish Otero II, MD    CT cervical spine wo IV contrast    Result Date: 12/31/2024  1.  No CT evidence of acute fracture. 2.  Degenerative changes throughout the cervical spine, as above.   Signed by: Vinh Bautista 12/31/2024 11:47 AM Dictation workstation:   [id number]    CT head wo IV contrast    Result Date: 12/31/2024  No evidence of acute cortical infarct or intracranial hemorrhage.   MACRO: None     Signed by: Vinh Bautista 12/31/2024 11:41 AM Dictation workstation:   [id number]    XR chest 1 view    Result Date: 12/29/2024  Cardiomegaly with pulmonary vascular congestive change, right pleural effusion and right side atelectasis or airspace disease.   MACRO: None   Signed by: Diomedes Peng 12/29/2024 6:49 PM Dictation workstation:   [id number]    XR chest 1 view    Result Date: 12/26/2024  Right-sided pleural effusion slightly increased in size compared to 5/14/2024 with associated right  basilar atelectasis and/or consolidation. Signed by Zane Culver MD    XR knee left 4+ views    Result Date: 12/20/2024  Moderate arthrosis of the left knee with a joint effusion. No acute osseous abnormality.   Signed by: Lam Cheatham 12/20/2024 11:40 AM Dictation workstation:   [id number]    XR chest 2 views    Result Date: 12/16/2024  Increasing right pleural effusion and basilar airspace disease. This could be pneumonia with parapneumonic effusion or some asymmetric edema.   Signed by: Lam Cheatham 12/16/2024 7:04 AM Dictation workstation:   [id number]                    Assessment/Plan   IMP:  70 yo F with significant PMH including ESRD, likely nearing end stage COPD w/ CRF requiring 3LPM O2 at baseline, CAD, AF, CVA history who presented with AHRF and has been declining NIMV. She has previoulsy been DNR/DNI but this was reversed this admission for a procedure. She continues to have metabolic encephalopathy likely 2/2 CO2 retention in the setting of COPD/CRF and recent influenza infection. She does not have capacity but can assent. HCPOA is her nephew Silver. Palliative was consulted for [facility].     1/7/2025 Conversation with nephew Silver:   He had spoken to her in person yesterday and she agreed to try CPAP. She thought it was more akin to intubation.   [facility] Esquivel: Nephshravan did call shelter, no response from shelter yet. Goal was for her to transition there after SNF.      1/6/25 Conversation with Silver BUSH:  She was supposed to transition to shelter. [facility] in [place] accepted her. I recommended that he call them about intake and whether she would be accepted there with her change in functional status. He reports that she has had multiple falls the last year and a sharper decline since 12/2024.   He will try to call her to encourage BiPAP.  We discussed her delirium, likely 2/2 retained CO2 and that she again expressed to me she would not like to have CPR or intubation. In view of her multiple organ failure, we agreed  that these two procedures would not fulfill her goals regarding minimal acceptable outcome and maximal burden of disease. He agreed to have her code status reverted to DNR/DNI as it was previously.      Plan:  DNRCCA form placed in chart for transport team  Nephew had come in to encourage NIMV after our conversation on 1/6/25 and she had accepted therapy.  DC to SNF today. Then [facility].            Provider estimate of survival: 1-6 months  Patient Prognostic Awareness: Yes - congruent with provider estimation     Patient/proxy preference for information  Prefers full information     Goals of Care  Full Code this admission, previously DNR DNI  DNR/DNI reverted on 1/6/2025 after conversation with patient and HCPOA       I spent 50 minutes in the professional and overall care of this patient.      Lam Noe MD

## 2025-01-07 NOTE — CARE PLAN
The patient's goals for the shift include      The clinical goals for the shift include pt will remain safe throughout shift

## 2025-01-07 NOTE — CARE PLAN
Problem: Pain - Adult  Goal: Verbalizes/displays adequate comfort level or baseline comfort level  Outcome: Progressing     Problem: Safety - Adult  Goal: Free from fall injury  Outcome: Progressing     Problem: Discharge Planning  Goal: Discharge to home or other facility with appropriate resources  Outcome: Progressing     Problem: Chronic Conditions and Co-morbidities  Goal: Patient's chronic conditions and co-morbidity symptoms are monitored and maintained or improved  Outcome: Progressing     Problem: Respiratory  Goal: Clear secretions with interventions this shift  Outcome: Progressing  Goal: Minimize anxiety/maximize coping throughout shift  Outcome: Progressing  Goal: Minimal/no exertional discomfort or dyspnea this shift  Outcome: Progressing  Goal: No signs of respiratory distress (eg. Use of accessory muscles. Peds grunting)  Outcome: Progressing  Goal: Patent airway maintained this shift  Outcome: Progressing  Goal: Tolerate mechanical ventilation evidenced by VS/agitation level this shift  Outcome: Progressing  Goal: Tolerate pulmonary toileting this shift  Outcome: Progressing  Goal: Verbalize decreased shortness of breath this shift  Outcome: Progressing  Goal: Wean oxygen to maintain O2 saturation per order/standard this shift  Outcome: Progressing  Goal: Increase self care and/or family involvement in next 24 hours  Outcome: Progressing     Problem: Skin  Goal: Decreased wound size/increased tissue granulation at next dressing change  Outcome: Progressing  Goal: Participates in plan/prevention/treatment measures  Outcome: Progressing  Goal: Prevent/manage excess moisture  Outcome: Progressing  Goal: Prevent/minimize sheer/friction injuries  Outcome: Progressing  Goal: Promote/optimize nutrition  Outcome: Progressing  Goal: Promote skin healing  Outcome: Progressing   The patient's goals for the shift include      The clinical goals for the shift include Pt will remain safe throughout  shift

## 2025-01-07 NOTE — PROGRESS NOTES
Daphne Morris is a 69 y.o. female on day 7 of admission presenting with Influenza.    Subjective   More awake today. Denies any respiratory complaints. Inquiring when she will be going home. Code status changed to DNR/DNI yesterday.       Objective   GEN: elderly woman in nor respiratory distress  ENT: wearing O2 nasal cannula  CV: RRR, no m/g/r  LUNGS: poor effort, clear bilaterally, no w/r/r  EXT: no edema, cyanosis, clubbing    Physical Exam    Last Recorded Vitals  Blood pressure 123/71, pulse 66, temperature 36.7 °C (98 °F), temperature source Oral, resp. rate 19, weight 70.7 kg (155 lb 13.8 oz), SpO2 94%.  Intake/Output last 3 Shifts:  I/O last 3 completed shifts:  In: 2099.7 (29.7 mL/kg) [I.V.:1699.7 (24 mL/kg); Other:400]  Out: 900 (12.7 mL/kg) [Other:900]  Weight: 70.7 kg     Relevant Results  Scheduled medications  amiodarone, 200 mg, oral, Daily with breakfast  amLODIPine, 5 mg, oral, Daily  aspirin, 81 mg, oral, Daily  atorvastatin, 40 mg, oral, Daily  clopidogrel, 75 mg, oral, Daily  ferrous gluconate, 324 mg, oral, Daily with breakfast  heparin (porcine), 5,000 Units, subcutaneous, q8h SUMIT  [Held by provider] insulin glargine, 10 Units, subcutaneous, Nightly  insulin lispro, 0-10 Units, subcutaneous, Before meals & nightly  ipratropium-albuteroL, 3 mL, nebulization, TID  levothyroxine, 250 mcg, oral, Daily before breakfast  [Held by provider] metoprolol succinate XL, 50 mg, oral, BID  oxygen, , inhalation, Continuous - Inhalation  oxygen, , inhalation, Continuous - Inhalation  pantoprazole, 40 mg, oral, BID  venlafaxine XR, 150 mg, oral, Daily      Continuous medications     PRN medications  PRN medications: acetaminophen **OR** acetaminophen **OR** acetaminophen, dextrose, dextrose, glucagon, glucagon, hydrOXYzine pamoate, ipratropium-albuteroL, ondansetron ODT **OR** ondansetron, oxygen    Results for orders placed or performed during the hospital encounter of 12/31/24 (from the past 24 hours)    POCT GLUCOSE   Result Value Ref Range    POCT Glucose 88 74 - 99 mg/dL   POCT GLUCOSE   Result Value Ref Range    POCT Glucose 89 74 - 99 mg/dL   POCT GLUCOSE   Result Value Ref Range    POCT Glucose 65 (L) 74 - 99 mg/dL   POCT GLUCOSE   Result Value Ref Range    POCT Glucose 121 (H) 74 - 99 mg/dL   POCT GLUCOSE   Result Value Ref Range    POCT Glucose 93 74 - 99 mg/dL   CBC and Auto Differential   Result Value Ref Range    WBC 8.2 4.4 - 11.3 x10*3/uL    nRBC 0.0 0.0 - 0.0 /100 WBCs    RBC 3.46 (L) 4.00 - 5.20 x10*6/uL    Hemoglobin 10.9 (L) 12.0 - 16.0 g/dL    Hematocrit 33.9 (L) 36.0 - 46.0 %    MCV 98 80 - 100 fL    MCH 31.5 26.0 - 34.0 pg    MCHC 32.2 32.0 - 36.0 g/dL    RDW 13.5 11.5 - 14.5 %    Platelets 151 150 - 450 x10*3/uL    Neutrophils % 85.4 40.0 - 80.0 %    Immature Granulocytes %, Automated 0.5 0.0 - 0.9 %    Lymphocytes % 7.2 13.0 - 44.0 %    Monocytes % 5.3 2.0 - 10.0 %    Eosinophils % 1.5 0.0 - 6.0 %    Basophils % 0.1 0.0 - 2.0 %    Neutrophils Absolute 6.99 1.20 - 7.70 x10*3/uL    Immature Granulocytes Absolute, Automated 0.04 0.00 - 0.70 x10*3/uL    Lymphocytes Absolute 0.59 (L) 1.20 - 4.80 x10*3/uL    Monocytes Absolute 0.43 0.10 - 1.00 x10*3/uL    Eosinophils Absolute 0.12 0.00 - 0.70 x10*3/uL    Basophils Absolute 0.01 0.00 - 0.10 x10*3/uL   Basic metabolic panel   Result Value Ref Range    Glucose 69 (L) 74 - 99 mg/dL    Sodium 126 (L) 136 - 145 mmol/L    Potassium 4.5 3.5 - 5.3 mmol/L    Chloride 89 (L) 98 - 107 mmol/L    Bicarbonate 31 21 - 32 mmol/L    Anion Gap 11 10 - 20 mmol/L    Urea Nitrogen 25 (H) 6 - 23 mg/dL    Creatinine 3.02 (H) 0.50 - 1.05 mg/dL    eGFR 16 (L) >60 mL/min/1.73m*2    Calcium 7.1 (L) 8.6 - 10.3 mg/dL   POCT GLUCOSE   Result Value Ref Range    POCT Glucose 76 74 - 99 mg/dL   POCT GLUCOSE   Result Value Ref Range    POCT Glucose 77 74 - 99 mg/dL     *Note: Due to a large number of results and/or encounters for the requested time period, some results have not been  displayed. A complete set of results can be found in Results Review.                 This patient currently has cardiac telemetry ordered; if you would like to modify or discontinue the telemetry order, click here to go to the orders activity to modify/discontinue the order.                 Assessment/Plan   Assessment & Plan  Influenza    Recurrent pleural effusion    Acute on chronic respiratory failure with hypoxia and hypercapnia (Multi)    Summary:  69 year old woman with chronic hypoxic respiratory failure on 3L/min, COPD, recurrent right pleural effusion (transudate), stroke, ESRD on HD, SU[facility]. Presented as transfer from [facility] after being down on the floor with acute hypercapnic respiratory failure. On baseline home O2 requirements. Remains somnolent. Underwent thoracentesis on 1/3/2025 again demonstrating transudate. She tested positive for influenza on 12/31/2024.     Recommendations:  -follow up final pleural fluid cytology  -volume management with HD  -encourage NIV   -could be potential candidate for tunneled pleural catheter as the effusion continues to recur despite dialysis, however, this would need to be addressed further with palliative care and patient's goals of care. This decision should not withhold discharge planning.  -will sign off at this time.       Kylie Lincoln, DO  Pulmonary & Critical Care  1/7/2025 2:00 PM

## 2025-01-07 NOTE — PROGRESS NOTES
01/07/25 1000   Discharge Planning   Expected Discharge Disposition SNF   Does the patient need discharge transport arranged? Yes   RoundTrip coordination needed? Yes   Has discharge transport been arranged? Yes   What day is the transport expected? 01/07/25   What time is the transport expected? 1200     The Wheelchair Van you requested for Daphne LARA in unit/room 407 on 01/07/2025 is scheduled to arrive at 12:00pm EST! [employer] is handling this ride and you can contact them at [phone].  This information and the report number of [phone] provided in chat to floor nurse. Also asked floor nurse to ask the pt if there was a family member the pt wanted me to notify of her discharge as the pt is [facility] and she has not been answering the room phone after multiple attempts have been made. Pt is A&O x4 according to notes so I dont want to call anyone unless she says to. Care Transitions to follow. Farhana BRANNONN/RN-TCC   [id number] Transport had to be rescheduled as the floor nurse thought she needed to have dialysis before she left. Transport stopped at this time. Farhana BEAUCHAMP/RN-TCC   [id number]   The [employer] Vehicle you requested for Daphne LARA in unit/room 407 on 01/07/2025 is scheduled to arrive at 6:30pm EST! [employer] is handling this ride and you can contact them at [phone] this is the new transport information I have notified the floor nurse and I am call the nephew Silver. Farhana BEAUCHAMP/RN-TCC

## 2025-01-08 LAB
ATRIAL RATE: 71 BPM
ATRIAL RATE: 72 BPM
P AXIS: 74 DEGREES
P OFFSET: 141 MS
P OFFSET: 150 MS
P ONSET: 107 MS
P ONSET: 113 MS
PR INTERVAL: 202 MS
PR INTERVAL: 210 MS
Q ONSET: 212 MS
Q ONSET: 214 MS
QRS COUNT: 11 BEATS
QRS COUNT: 12 BEATS
QRS DURATION: 132 MS
QRS DURATION: 136 MS
QT INTERVAL: 464 MS
QT INTERVAL: 470 MS
QTC CALCULATION(BAZETT): 508 MS
QTC CALCULATION(BAZETT): 510 MS
QTC FREDERICIA: 493 MS
QTC FREDERICIA: 497 MS
R AXIS: -56 DEGREES
R AXIS: -65 DEGREES
T AXIS: 85 DEGREES
T AXIS: 95 DEGREES
T OFFSET: 444 MS
T OFFSET: 449 MS
VENTRICULAR RATE: 71 BPM
VENTRICULAR RATE: 72 BPM

## 2025-01-09 ENCOUNTER — HOSPITAL ENCOUNTER (INPATIENT)
Facility: HOSPITAL | Age: 70
LOS: 1 days | Discharge: OTHER INSTITUTION | End: 2025-01-10
Attending: EMERGENCY MEDICINE | Admitting: INTERNAL MEDICINE
Payer: MEDICARE

## 2025-01-09 ENCOUNTER — APPOINTMENT (OUTPATIENT)
Dept: RADIOLOGY | Facility: HOSPITAL | Age: 70
End: 2025-01-09
Payer: MEDICARE

## 2025-01-09 DIAGNOSIS — T82.868A: Primary | ICD-10-CM

## 2025-01-09 DIAGNOSIS — N18.6 ESRD (END STAGE RENAL DISEASE) (MULTI): ICD-10-CM

## 2025-01-09 PROBLEM — Z78.9 PROBLEM WITH VASCULAR ACCESS: Status: ACTIVE | Noted: 2025-01-09

## 2025-01-09 PROBLEM — J96.10 CHRONIC RESPIRATORY FAILURE: Status: ACTIVE | Noted: 2025-01-09

## 2025-01-09 LAB
ALBUMIN SERPL BCP-MCNC: 2.9 G/DL (ref 3.4–5)
ALP SERPL-CCNC: 87 U/L (ref 33–136)
ALT SERPL W P-5'-P-CCNC: 7 U/L (ref 7–45)
ANION GAP SERPL CALCULATED.3IONS-SCNC: 12 MMOL/L (ref 10–20)
AST SERPL W P-5'-P-CCNC: 10 U/L (ref 9–39)
BASOPHILS # BLD AUTO: 0.02 X10*3/UL (ref 0–0.1)
BASOPHILS NFR BLD AUTO: 0.2 %
BILIRUB SERPL-MCNC: 0.6 MG/DL (ref 0–1.2)
BUN SERPL-MCNC: 41 MG/DL (ref 6–23)
CALCIUM SERPL-MCNC: 7.6 MG/DL (ref 8.6–10.3)
CHLORIDE SERPL-SCNC: 91 MMOL/L (ref 98–107)
CO2 SERPL-SCNC: 27 MMOL/L (ref 21–32)
CREAT SERPL-MCNC: 3.57 MG/DL (ref 0.5–1.05)
EGFRCR SERPLBLD CKD-EPI 2021: 13 ML/MIN/1.73M*2
EOSINOPHIL # BLD AUTO: 0.18 X10*3/UL (ref 0–0.7)
EOSINOPHIL NFR BLD AUTO: 1.5 %
ERYTHROCYTE [DISTWIDTH] IN BLOOD BY AUTOMATED COUNT: 13.4 % (ref 11.5–14.5)
GLUCOSE SERPL-MCNC: 92 MG/DL (ref 74–99)
HCT VFR BLD AUTO: 32.5 % (ref 36–46)
HGB BLD-MCNC: 10.3 G/DL (ref 12–16)
IMM GRANULOCYTES # BLD AUTO: 0.08 X10*3/UL (ref 0–0.7)
IMM GRANULOCYTES NFR BLD AUTO: 0.7 % (ref 0–0.9)
LYMPHOCYTES # BLD AUTO: 0.43 X10*3/UL (ref 1.2–4.8)
LYMPHOCYTES NFR BLD AUTO: 3.6 %
MCH RBC QN AUTO: 30.3 PG (ref 26–34)
MCHC RBC AUTO-ENTMCNC: 31.7 G/DL (ref 32–36)
MCV RBC AUTO: 96 FL (ref 80–100)
MONOCYTES # BLD AUTO: 0.77 X10*3/UL (ref 0.1–1)
MONOCYTES NFR BLD AUTO: 6.5 %
NEUTROPHILS # BLD AUTO: 10.38 X10*3/UL (ref 1.2–7.7)
NEUTROPHILS NFR BLD AUTO: 87.5 %
NRBC BLD-RTO: 0 /100 WBCS (ref 0–0)
PLATELET # BLD AUTO: 179 X10*3/UL (ref 150–450)
POTASSIUM SERPL-SCNC: 4.3 MMOL/L (ref 3.5–5.3)
PROT SERPL-MCNC: 5.6 G/DL (ref 6.4–8.2)
RBC # BLD AUTO: 3.4 X10*6/UL (ref 4–5.2)
SODIUM SERPL-SCNC: 126 MMOL/L (ref 136–145)
WBC # BLD AUTO: 11.9 X10*3/UL (ref 4.4–11.3)

## 2025-01-09 PROCEDURE — 2500000001 HC RX 250 WO HCPCS SELF ADMINISTERED DRUGS (ALT 637 FOR MEDICARE OP): Performed by: NURSE PRACTITIONER

## 2025-01-09 PROCEDURE — 84075 ASSAY ALKALINE PHOSPHATASE: CPT | Performed by: EMERGENCY MEDICINE

## 2025-01-09 PROCEDURE — 71045 X-RAY EXAM CHEST 1 VIEW: CPT | Performed by: RADIOLOGY

## 2025-01-09 PROCEDURE — 1100000001 HC PRIVATE ROOM DAILY

## 2025-01-09 PROCEDURE — 36415 COLL VENOUS BLD VENIPUNCTURE: CPT | Performed by: EMERGENCY MEDICINE

## 2025-01-09 PROCEDURE — 99285 EMERGENCY DEPT VISIT HI MDM: CPT | Performed by: EMERGENCY MEDICINE

## 2025-01-09 PROCEDURE — 2500000004 HC RX 250 GENERAL PHARMACY W/ HCPCS (ALT 636 FOR OP/ED): Performed by: NURSE PRACTITIONER

## 2025-01-09 PROCEDURE — 99223 1ST HOSP IP/OBS HIGH 75: CPT | Performed by: NURSE PRACTITIONER

## 2025-01-09 PROCEDURE — 99222 1ST HOSP IP/OBS MODERATE 55: CPT | Performed by: NURSE PRACTITIONER

## 2025-01-09 PROCEDURE — 71045 X-RAY EXAM CHEST 1 VIEW: CPT

## 2025-01-09 PROCEDURE — 85025 COMPLETE CBC W/AUTO DIFF WBC: CPT | Performed by: EMERGENCY MEDICINE

## 2025-01-09 RX ORDER — METOPROLOL SUCCINATE 50 MG/1
50 TABLET, EXTENDED RELEASE ORAL 2 TIMES DAILY
Status: DISCONTINUED | OUTPATIENT
Start: 2025-01-09 | End: 2025-01-10 | Stop reason: HOSPADM

## 2025-01-09 RX ORDER — PANTOPRAZOLE SODIUM 40 MG/10ML
40 INJECTION, POWDER, LYOPHILIZED, FOR SOLUTION INTRAVENOUS
Status: DISCONTINUED | OUTPATIENT
Start: 2025-01-10 | End: 2025-01-09

## 2025-01-09 RX ORDER — PROCHLORPERAZINE 25 MG/1
25 SUPPOSITORY RECTAL EVERY 12 HOURS PRN
Status: DISCONTINUED | OUTPATIENT
Start: 2025-01-09 | End: 2025-01-10 | Stop reason: HOSPADM

## 2025-01-09 RX ORDER — AMLODIPINE BESYLATE 5 MG/1
5 TABLET ORAL DAILY
Status: DISCONTINUED | OUTPATIENT
Start: 2025-01-10 | End: 2025-01-10 | Stop reason: HOSPADM

## 2025-01-09 RX ORDER — ATORVASTATIN CALCIUM 40 MG/1
40 TABLET, FILM COATED ORAL DAILY
Status: DISCONTINUED | OUTPATIENT
Start: 2025-01-10 | End: 2025-01-10 | Stop reason: HOSPADM

## 2025-01-09 RX ORDER — ALBUTEROL SULFATE 0.83 MG/ML
2.5 SOLUTION RESPIRATORY (INHALATION) EVERY 4 HOURS PRN
Status: DISCONTINUED | OUTPATIENT
Start: 2025-01-09 | End: 2025-01-10 | Stop reason: HOSPADM

## 2025-01-09 RX ORDER — ACETAMINOPHEN 160 MG/5ML
650 SOLUTION ORAL EVERY 4 HOURS PRN
Status: DISCONTINUED | OUTPATIENT
Start: 2025-01-09 | End: 2025-01-10 | Stop reason: HOSPADM

## 2025-01-09 RX ORDER — LEVOTHYROXINE SODIUM 100 UG/1
200 TABLET ORAL
Status: DISCONTINUED | OUTPATIENT
Start: 2025-01-10 | End: 2025-01-10 | Stop reason: HOSPADM

## 2025-01-09 RX ORDER — ONDANSETRON HYDROCHLORIDE 2 MG/ML
4 INJECTION, SOLUTION INTRAVENOUS EVERY 8 HOURS PRN
Status: DISCONTINUED | OUTPATIENT
Start: 2025-01-09 | End: 2025-01-10 | Stop reason: HOSPADM

## 2025-01-09 RX ORDER — PANTOPRAZOLE SODIUM 40 MG/1
40 TABLET, DELAYED RELEASE ORAL 2 TIMES DAILY
Status: DISCONTINUED | OUTPATIENT
Start: 2025-01-09 | End: 2025-01-09

## 2025-01-09 RX ORDER — VENLAFAXINE HYDROCHLORIDE 150 MG/1
150 CAPSULE, EXTENDED RELEASE ORAL DAILY
Status: DISCONTINUED | OUTPATIENT
Start: 2025-01-10 | End: 2025-01-10 | Stop reason: HOSPADM

## 2025-01-09 RX ORDER — ASPIRIN 81 MG/1
81 TABLET ORAL DAILY
Status: DISCONTINUED | OUTPATIENT
Start: 2025-01-10 | End: 2025-01-10 | Stop reason: HOSPADM

## 2025-01-09 RX ORDER — PANTOPRAZOLE SODIUM 40 MG/1
40 TABLET, DELAYED RELEASE ORAL
Status: DISCONTINUED | OUTPATIENT
Start: 2025-01-10 | End: 2025-01-10 | Stop reason: HOSPADM

## 2025-01-09 RX ORDER — PROCHLORPERAZINE EDISYLATE 5 MG/ML
10 INJECTION INTRAMUSCULAR; INTRAVENOUS EVERY 6 HOURS PRN
Status: DISCONTINUED | OUTPATIENT
Start: 2025-01-09 | End: 2025-01-10 | Stop reason: HOSPADM

## 2025-01-09 RX ORDER — ONDANSETRON 4 MG/1
4 TABLET, ORALLY DISINTEGRATING ORAL EVERY 8 HOURS PRN
Status: DISCONTINUED | OUTPATIENT
Start: 2025-01-09 | End: 2025-01-10 | Stop reason: HOSPADM

## 2025-01-09 RX ORDER — ACETAMINOPHEN 650 MG/1
650 SUPPOSITORY RECTAL EVERY 4 HOURS PRN
Status: DISCONTINUED | OUTPATIENT
Start: 2025-01-09 | End: 2025-01-10 | Stop reason: HOSPADM

## 2025-01-09 RX ORDER — PROCHLORPERAZINE MALEATE 10 MG
10 TABLET ORAL EVERY 6 HOURS PRN
Status: DISCONTINUED | OUTPATIENT
Start: 2025-01-09 | End: 2025-01-10 | Stop reason: HOSPADM

## 2025-01-09 RX ORDER — LEVOTHYROXINE SODIUM 50 UG/1
50 TABLET ORAL
Status: DISCONTINUED | OUTPATIENT
Start: 2025-01-10 | End: 2025-01-10 | Stop reason: HOSPADM

## 2025-01-09 RX ORDER — CLOPIDOGREL BISULFATE 75 MG/1
75 TABLET ORAL DAILY
Status: DISCONTINUED | OUTPATIENT
Start: 2025-01-09 | End: 2025-01-10 | Stop reason: HOSPADM

## 2025-01-09 RX ORDER — GUAIFENESIN/DEXTROMETHORPHAN 100-10MG/5
5 SYRUP ORAL EVERY 4 HOURS PRN
Status: DISCONTINUED | OUTPATIENT
Start: 2025-01-09 | End: 2025-01-10 | Stop reason: HOSPADM

## 2025-01-09 RX ORDER — ACETAMINOPHEN 325 MG/1
650 TABLET ORAL EVERY 4 HOURS PRN
Status: DISCONTINUED | OUTPATIENT
Start: 2025-01-09 | End: 2025-01-10 | Stop reason: HOSPADM

## 2025-01-09 RX ORDER — AMIODARONE HYDROCHLORIDE 200 MG/1
200 TABLET ORAL
Status: DISCONTINUED | OUTPATIENT
Start: 2025-01-10 | End: 2025-01-10 | Stop reason: HOSPADM

## 2025-01-09 RX ORDER — BISACODYL 5 MG
10 TABLET, DELAYED RELEASE (ENTERIC COATED) ORAL DAILY PRN
Status: DISCONTINUED | OUTPATIENT
Start: 2025-01-09 | End: 2025-01-10 | Stop reason: HOSPADM

## 2025-01-09 RX ORDER — POLYETHYLENE GLYCOL 3350 17 G/17G
17 POWDER, FOR SOLUTION ORAL AS NEEDED
Status: DISCONTINUED | OUTPATIENT
Start: 2025-01-09 | End: 2025-01-10 | Stop reason: HOSPADM

## 2025-01-09 RX ORDER — IPRATROPIUM BROMIDE AND ALBUTEROL SULFATE 2.5; .5 MG/3ML; MG/3ML
3 SOLUTION RESPIRATORY (INHALATION) EVERY 6 HOURS PRN
Status: DISCONTINUED | OUTPATIENT
Start: 2025-01-09 | End: 2025-01-10 | Stop reason: HOSPADM

## 2025-01-09 RX ORDER — FERROUS GLUCONATE 325 MG
324 TABLET ORAL
Status: DISCONTINUED | OUTPATIENT
Start: 2025-01-10 | End: 2025-01-10 | Stop reason: HOSPADM

## 2025-01-09 RX ORDER — HEPARIN SODIUM 5000 [USP'U]/ML
5000 INJECTION, SOLUTION INTRAVENOUS; SUBCUTANEOUS 2 TIMES DAILY
Status: DISCONTINUED | OUTPATIENT
Start: 2025-01-09 | End: 2025-01-10 | Stop reason: HOSPADM

## 2025-01-09 RX ADMIN — HEPARIN SODIUM 5000 UNITS: 5000 INJECTION, SOLUTION INTRAVENOUS; SUBCUTANEOUS at 22:14

## 2025-01-09 RX ADMIN — METOPROLOL SUCCINATE 50 MG: 50 TABLET, EXTENDED RELEASE ORAL at 22:14

## 2025-01-09 SDOH — ECONOMIC STABILITY: FOOD INSECURITY: WITHIN THE PAST 12 MONTHS, THE FOOD YOU BOUGHT JUST DIDN'T LAST AND YOU DIDN'T HAVE MONEY TO GET MORE.: NEVER TRUE

## 2025-01-09 SDOH — SOCIAL STABILITY: SOCIAL INSECURITY: DO YOU FEEL UNSAFE GOING BACK TO THE PLACE WHERE YOU ARE LIVING?: NO

## 2025-01-09 SDOH — SOCIAL STABILITY: SOCIAL INSECURITY: HAVE YOU HAD THOUGHTS OF HARMING ANYONE ELSE?: NO

## 2025-01-09 SDOH — ECONOMIC STABILITY: INCOME INSECURITY: IN THE PAST 12 MONTHS HAS THE ELECTRIC, GAS, OIL, OR WATER COMPANY THREATENED TO SHUT OFF SERVICES IN YOUR HOME?: NO

## 2025-01-09 SDOH — SOCIAL STABILITY: SOCIAL INSECURITY: DOES ANYONE TRY TO KEEP YOU FROM HAVING/CONTACTING OTHER FRIENDS OR DOING THINGS OUTSIDE YOUR HOME?: NO

## 2025-01-09 SDOH — SOCIAL STABILITY: SOCIAL INSECURITY: ABUSE: ADULT

## 2025-01-09 SDOH — SOCIAL STABILITY: SOCIAL INSECURITY: WITHIN THE LAST YEAR, HAVE YOU BEEN HUMILIATED OR EMOTIONALLY ABUSED IN OTHER WAYS BY YOUR PARTNER OR EX-PARTNER?: NO

## 2025-01-09 SDOH — ECONOMIC STABILITY: FOOD INSECURITY: WITHIN THE PAST 12 MONTHS, YOU WORRIED THAT YOUR FOOD WOULD RUN OUT BEFORE YOU GOT THE MONEY TO BUY MORE.: NEVER TRUE

## 2025-01-09 SDOH — SOCIAL STABILITY: SOCIAL INSECURITY: WITHIN THE LAST YEAR, HAVE YOU BEEN AFRAID OF YOUR PARTNER OR EX-PARTNER?: NO

## 2025-01-09 SDOH — SOCIAL STABILITY: SOCIAL INSECURITY: HAVE YOU HAD ANY THOUGHTS OF HARMING ANYONE ELSE?: NO

## 2025-01-09 SDOH — ECONOMIC STABILITY: HOUSING INSECURITY: IN THE PAST 12 MONTHS, HOW MANY TIMES HAVE YOU MOVED WHERE YOU WERE LIVING?: 1

## 2025-01-09 SDOH — SOCIAL STABILITY: SOCIAL INSECURITY: DO YOU FEEL ANYONE HAS EXPLOITED OR TAKEN ADVANTAGE OF YOU FINANCIALLY OR OF YOUR PERSONAL PROPERTY?: NO

## 2025-01-09 SDOH — SOCIAL STABILITY: SOCIAL INSECURITY: ARE THERE ANY APPARENT SIGNS OF INJURIES/BEHAVIORS THAT COULD BE RELATED TO ABUSE/NEGLECT?: NO

## 2025-01-09 SDOH — SOCIAL STABILITY: SOCIAL INSECURITY: HAS ANYONE EVER THREATENED TO HURT YOUR FAMILY OR YOUR PETS?: NO

## 2025-01-09 SDOH — SOCIAL STABILITY: SOCIAL INSECURITY: ARE YOU OR HAVE YOU BEEN THREATENED OR ABUSED PHYSICALLY, EMOTIONALLY, OR SEXUALLY BY ANYONE?: NO

## 2025-01-09 SDOH — ECONOMIC STABILITY: FOOD INSECURITY: HOW HARD IS IT FOR YOU TO PAY FOR THE VERY BASICS LIKE FOOD, HOUSING, MEDICAL CARE, AND HEATING?: NOT HARD AT ALL

## 2025-01-09 SDOH — SOCIAL STABILITY: SOCIAL INSECURITY: WERE YOU ABLE TO COMPLETE ALL THE BEHAVIORAL HEALTH SCREENINGS?: YES

## 2025-01-09 ASSESSMENT — ENCOUNTER SYMPTOMS
NUMBNESS: 0
HEMATOLOGIC/LYMPHATIC NEGATIVE: 1
UNEXPECTED WEIGHT CHANGE: 0
COUGH: 0
SEIZURES: 0
SHORTNESS OF BREATH: 0
APNEA: 0
DIARRHEA: 0
FATIGUE: 0
CHEST TIGHTNESS: 0
ABDOMINAL PAIN: 0
TREMORS: 0
LIGHT-HEADEDNESS: 0
ACTIVITY CHANGE: 0
ENDOCRINE NEGATIVE: 1
NECK PAIN: 1
DIAPHORESIS: 0
TROUBLE SWALLOWING: 0
MYALGIAS: 1
APPETITE CHANGE: 0
PALPITATIONS: 0
RHINORRHEA: 0
DIZZINESS: 0
BLOOD IN STOOL: 0
DIFFICULTY URINATING: 0
ABDOMINAL DISTENTION: 0
ALLERGIC/IMMUNOLOGIC NEGATIVE: 1
HEADACHES: 0
SPEECH DIFFICULTY: 0
NAUSEA: 0
VOMITING: 0
VOICE CHANGE: 0
WHEEZING: 0
PSYCHIATRIC NEGATIVE: 1
FACIAL ASYMMETRY: 0
SORE THROAT: 0
BACK PAIN: 1
NECK STIFFNESS: 0
WEAKNESS: 0
FEVER: 0

## 2025-01-09 ASSESSMENT — LIFESTYLE VARIABLES
AUDIT-C TOTAL SCORE: 0
AUDIT-C TOTAL SCORE: 0
HOW OFTEN DO YOU HAVE A DRINK CONTAINING ALCOHOL: NEVER
SKIP TO QUESTIONS 9-10: 1
HOW MANY STANDARD DRINKS CONTAINING ALCOHOL DO YOU HAVE ON A TYPICAL DAY: PATIENT DOES NOT DRINK
HOW OFTEN DO YOU HAVE 6 OR MORE DRINKS ON ONE OCCASION: NEVER

## 2025-01-09 ASSESSMENT — PAIN - FUNCTIONAL ASSESSMENT
PAIN_FUNCTIONAL_ASSESSMENT: 0-10
PAIN_FUNCTIONAL_ASSESSMENT: 0-10

## 2025-01-09 ASSESSMENT — COGNITIVE AND FUNCTIONAL STATUS - GENERAL
DAILY ACTIVITIY SCORE: 18
STANDING UP FROM CHAIR USING ARMS: A LOT
PERSONAL GROOMING: A LITTLE
TOILETING: A LITTLE
HELP NEEDED FOR BATHING: A LITTLE
MOVING FROM LYING ON BACK TO SITTING ON SIDE OF FLAT BED WITH BEDRAILS: A LITTLE
DRESSING REGULAR UPPER BODY CLOTHING: A LITTLE
EATING MEALS: A LITTLE
CLIMB 3 TO 5 STEPS WITH RAILING: A LOT
WALKING IN HOSPITAL ROOM: A LITTLE
DRESSING REGULAR LOWER BODY CLOTHING: A LITTLE
TURNING FROM BACK TO SIDE WHILE IN FLAT BAD: A LOT
MOBILITY SCORE: 15
PATIENT BASELINE BEDBOUND: NO
MOVING TO AND FROM BED TO CHAIR: A LITTLE

## 2025-01-09 ASSESSMENT — ACTIVITIES OF DAILY LIVING (ADL)
ADEQUATE_TO_COMPLETE_ADL: UNABLE TO ASSESS
HEARING - LEFT EAR: DIFFICULTY WITH NOISE
WALKS IN HOME: NEEDS ASSISTANCE
LACK_OF_TRANSPORTATION: NO
HEARING - RIGHT EAR: DIFFICULTY WITH NOISE
PATIENT'S MEMORY ADEQUATE TO SAFELY COMPLETE DAILY ACTIVITIES?: YES
BATHING: NEEDS ASSISTANCE
ASSISTIVE_DEVICE: WALKER
GROOMING: NEEDS ASSISTANCE
DRESSING YOURSELF: NEEDS ASSISTANCE
FEEDING YOURSELF: NEEDS ASSISTANCE
JUDGMENT_ADEQUATE_SAFELY_COMPLETE_DAILY_ACTIVITIES: YES
TOILETING: NEEDS ASSISTANCE

## 2025-01-09 ASSESSMENT — PAIN SCALES - GENERAL
PAINLEVEL_OUTOF10: 0 - NO PAIN
PAINLEVEL_OUTOF10: 0 - NO PAIN

## 2025-01-09 NOTE — ED PROVIDER NOTES
EMERGENCY DEPARTMENT ENCOUNTER      Pt Name: Daphne Morris  MRN: [id number]  Birthdate 1955  Date of evaluation: 1/9/2025  ED Provider: Karly Brownlee DO     CHIEF COMPLAINT       Chief Complaint   Patient presents with    Vascular Access Problem     Patient evelin [facility] ems for co AV fistula not working. Per ems unknown for how long the fistula hasn't worked and unknown last treatment. Patient is a&ox3 and nad. Patient wears 2 L NC chronically.        HISTORY OF PRESENT ILLNESS    Daphne Morris is a 69 y.o. who presents to the emergency department with issue with her right upper extremity fistula.  According to chart review she was recently hospitalized and discharged on 1/7 to a skilled nursing facility.  The patient cannot recall when her last dialysis was but admits that it is on a Tuesday, Thursday, Saturday schedule.  Chart review does show that last dialysis was on 1/6/2025.  No shows no complications with dialysis at that time.  The patient self has no acute complaints.  She denies any shortness of breath or pain in her arm.    REVIEW OF SYSTEMS     Focused ROS performed and negative other than as listed in HPI    PAST MEDICAL HISTORY     Past Medical History:   Diagnosis Date    Anal fissure 10/12/2023    Anemia     ASHD (arteriosclerotic heart disease)     At risk for falls     Atrial fibrillation (Multi)     CHF (congestive heart failure)     CKD (chronic kidney disease)     COPD (chronic obstructive pulmonary disease) (Multi)     CVA (cerebral vascular accident) (Multi)     2021- right-sided weakness    Depression     Diabetes mellitus (Multi)     GERD (gastroesophageal reflux disease)     H/O pleural effusion     Hyperlipidemia     Hypertension     Hypothyroidism     Hypoxia     Impacted cerumen, left ear     Impacted cerumen of left ear    Noncompliance     Osteoarthritis     Perianal dermatitis 10/12/2023    Personal history of other diseases of the respiratory system     History of acute  "bronchitis    PVD (peripheral vascular disease) (CMS-HCC)     Renal carcinoma, right (Multi)     s/p partial nephrectomy 2021    Respiratory failure (Multi)     TIA (transient ischemic attack)     Vasculitis (CMS-HCC) 10/12/2023    Weakness        SURGICAL HISTORY       Past Surgical History:   Procedure Laterality Date    ANKLE SURGERY          CARDIAC CATHETERIZATION N/A 2024    Procedure: Right Heart Cath;  Surgeon: Nicholas Antonio MD;  Location: [facility] Cardiac Cath Lab;  Service: Cardiovascular;  Laterality: N/A;    CATARACT EXTRACTION Right     2019     SECTION, CLASSIC      MR CHEST ANGIO W AND WO IV CONTRAST  2023    MR CHEST ANGIO W AND WO IV CONTRAST 2023 [facility] MRI    MR HEAD ANGIO WO IV CONTRAST  2021    MR HEAD ANGIO WO IV CONTRAST [facility]    NEPHRECTOMY  2021    SHOULDER SURGERY             CURRENT MEDICATIONS       Previous Medications    ACETAMINOPHEN (TYLENOL) 500 MG TABLET    Take 2 tablets (1,000 mg) by mouth 2 times a day.    ALBUTEROL 90 MCG/ACTUATION INHALER    INHALE 2 PUFFS BY MOUTH EVERY 4 HOURS AS NEEDED    AMIODARONE (PACERONE) 200 MG TABLET    TAKE 1 TABLET BY MOUTH ONCE DAILY WITH BREAKFAST.    AMLODIPINE (NORVASC) 5 MG TABLET    Take 1 tablet (5 mg) by mouth once daily.    ASPIRIN 81 MG EC TABLET    Take 1 tablet (81 mg) by mouth once daily.    ATORVASTATIN (LIPITOR) 40 MG TABLET    TAKE ONE TABLET BY MOUTH EVERY DAY    BD ALBERT 2ND GEN PEN NEEDLE 32 GAUGE X /32\" NEEDLE    USE SUBCUTANEOUSLY AS DIRECTED TWICE DAILY    CLOPIDOGREL (PLAVIX) 75 MG TABLET    Take 1 tablet (75 mg) by mouth once daily.    FERROUS GLUCONATE 324 (38 FE) MG TABLET    Take 1 tablet (324 mg) by mouth once daily with breakfast.    FREESTYLE REMINGTON 14 DAY SENSOR KIT    USE AS DIRECTED TO CHECK SUGARS 3 TO 4 TIMES DAILY    FREESTYLE REMINGTON 2 READER MISC    Use as instructed    FREESTYLE REMINGTON 3 READER MISC    Use as instructed    FREESTYLE REMINGTON 3 SENSOR " DEVICE    Change sensor Q 14 days    HUMALOG KWIKPEN INSULIN 100 UNIT/ML INJECTION    up to 15 units Subcutaneous three times a day per sliding scaleup to 15 units Subcutaneous three times a day per sliding scale    IPRATROPIUM-ALBUTEROL (DUO-NEB) 0.5-2.5 MG/3 ML NEBULIZER SOLUTION    INHALE THE CONTENTS OF 1 VIAL VIA NEBULIZER EVERY 6 HOURS AS NEEDED.    LEVOTHYROXINE (SYNTHROID, LEVOXYL) 200 MCG TABLET    Take 1 tablet by mouth once daily in the morning before a meal.    LEVOTHYROXINE (SYNTHROID, LEVOXYL) 50 MCG TABLET    Take 1 tablet (50 mcg) by mouth once daily in the morning. Take before meals.    METOPROLOL SUCCINATE XL (TOPROL-XL) 50 MG 24 HR TABLET    TAKE ONE TABLET BY MOUTH TWO TIMES A DAY    OXYGEN DME/HOSPICE (O2) THERAPY    Inhale 3 L/min once daily at bedtime. Indications: decreased oxygen in the tissues or blood    PANTOPRAZOLE (PROTONIX) 40 MG EC TABLET    TAKE ONE TABLET BY MOUTH TWO TIMES A DAY    VENLAFAXINE XR (EFFEXOR-XR) 75 MG 24 HR CAPSULE    Take 2 capsules (150 mg) by mouth once daily.       ALLERGIES     Bee venom protein (honey bee); Citalopram; Codeine; Influenza virus vaccines; Latex; Latex, natural rubber; Lisinopril; Penicillins; Adhesive tape-silicones; Amoxicillin; Doxycycline; Oseltamivir; Phenyleph-min oil-petrolatum; Phenyleph-shark oil-glyc-pet; Phenylephrine; Prednisone; Tuberculin ppd; and Xylometazoline    FAMILY HISTORY       Family History   Problem Relation Name Age of Onset    Hypertension Mother      Diabetes Father      Heart disease Father      Cancer Sister      Cancer Brother      Diabetes Daughter      Asthma Daughter      Heart attack Daughter      Diabetes Maternal Grandfather      Heart disease Paternal Grandmother      Diabetes Other      Hypertension Other      COPD Other      Other (chronic lung disease) Other          SOCIAL HISTORY       Social History     Socioeconomic History    Marital status:    Tobacco Use    Smoking status: Every Day      Current packs/day: 0.50     Average packs/day: 0.5 packs/day for 56.0 years (28.0 ttl pk-yrs)     Types: Cigarettes     Start date: 1/1/1969     Passive exposure: Never    Smokeless tobacco: Never   Vaping Use    Vaping status: Never Used   Substance and Sexual Activity    Alcohol use: Not Currently    Drug use: Not Currently     Types: Marijuana     Social Drivers of Health     Financial Resource Strain: Low Risk  (1/2/2025)    Overall Financial Resource Strain (CARDIA)     Difficulty of Paying Living Expenses: Not hard at all   Food Insecurity: No Food Insecurity (12/26/2024)    Hunger Vital Sign     Worried About Running Out of Food in the Last Year: Never true     Ran Out of Food in the Last Year: Never true   Transportation Needs: No Transportation Needs (1/2/2025)    PRAPARE - Transportation     Lack of Transportation (Medical): No     Lack of Transportation (Non-Medical): No   Physical Activity: Inactive (1/2/2025)    Exercise Vital Sign     Days of Exercise per Week: 0 days     Minutes of Exercise per Session: 0 min   Stress: At Risk (8/20/2024)    Received from [facility]    Stress     Stress: 2   Social Connections: Feeling Socially Isolated (8/23/2024)    OASIS : Social Isolation     Frequency of experiencing loneliness or isolation: Often   Intimate Partner Violence: Not At Risk (12/26/2024)    Humiliation, Afraid, Rape, and Kick questionnaire     Fear of Current or Ex-Partner: No     Emotionally Abused: No     Physically Abused: No     Sexually Abused: No   Housing Stability: Low Risk  (1/2/2025)    Housing Stability Vital Sign     Unable to Pay for Housing in the Last Year: No     Number of Times Moved in the Last Year: 1     Homeless in the Last Year: No       PHYSICAL EXAM       ED Triage Vitals [01/09/25 0903]   Temperature Heart Rate Respirations BP   36.6 °C (97.9 °F) 61 15 120/88      Pulse Ox Temp Source Heart Rate Source Patient Position   96 % Temporal -- --      BP Location FiO2 (%)     -- --         General: Appears chronically ill but in no acute distress, alert  Head: Head atraumatic; normocephalic  Eyes: normal inspection; no icterus  ENT: mucosa moist without lesion  Neck: Normal inspection, no meningeal signs  Resp: Normal breath sounds, no wheeze or crackles; No respiratory distress  Chest Wall: no tenderness or deformity  Heart: Heart rate and rhythm regular; No Murmurs  Abdomen: Soft, Non-tender; No distention, guarding, rigidity, or rebound  MSK: Moves all extremities; healing skin tear to the right forearm.  Palpable fistula right upper extremity on the anterior aspect with no pulsatile thrill or pulse.  Neuro: Alert; no focal deficits, moves all extremities  Psych: Mood and Affect normal  Skin: Color appropriate; warm; Dry    DIAGNOSTIC RESULTS   Lab and radiology results are independently interpreted unless noted below.  RADIOLOGY (Per Emergency Physician):     Interpretation per the Radiologist below, if available at the time of this note:  XR chest 1 view   Final Result   Persistent cardiomegaly with vascular and interstitial congestion.        Bilateral pleural effusions and parenchymal opacity, greater on the   right. These findings have increased in the interval. Continued   clinical and radiographic follow-up is recommended.             MACRO:   None        Signed by: Sujit Rebolledo 1/9/2025 9:46 AM   Dictation workstation:   [id number]          LABS:  Abnormal Labs Reviewed   CBC WITH AUTO DIFFERENTIAL - Abnormal; Notable for the following components:       Result Value    WBC 11.9 (*)     RBC 3.40 (*)     Hemoglobin 10.3 (*)     Hematocrit 32.5 (*)     MCHC 31.7 (*)     Neutrophils Absolute 10.38 (*)     Lymphocytes Absolute 0.43 (*)     All other components within normal limits   COMPREHENSIVE METABOLIC PANEL - Abnormal; Notable for the following components:    Sodium 126 (*)     Chloride 91 (*)     Urea Nitrogen 41 (*)     Creatinine 3.57 (*)     eGFR 13 (*)     Calcium 7.6  (*)     Albumin 2.9 (*)     Total Protein 5.6 (*)     All other components within normal limits       All other labs were within normal range or not returned as of this dictation.    EMERGENCY DEPARTMENT COURSE/MDM   Patient presents with issue with her fistula.  Chart review was performed and on 8/28/2024 the patient was seen by vascular surgery.  She does have a right brachial axillary loop graft that was placed in June.  Basic blood work be obtained to ensure that she is biochemically stable.  Vascular surgery will be consulted.    ED Course as of 01/09/25 1729   Thu Jan 09, 2025 1728 Patient was evaluated by the vascular surgery NP who agreed that the AV graft is clotted off.  The patient was accepted to [facility] for intervention tomorrow however there are no beds available.  She therefore is excepted to the hospitalist with the plan on boomeranging over to [facility] and back tomorrow for definitive care of her graft. [EF]      ED Course User Index  [EF] Karly Brownlee, DO         Diagnoses as of 01/09/25 1729   Clotted renal dialysis AV graft, initial encounter (CMS-HCC)   ESRD (end stage renal disease) (Multi)       Meds Administered:  Medications - No data to display    PROCEDURES   Unless otherwise noted below, none  Procedures      FINAL IMPRESSION      1. Clotted renal dialysis AV graft, initial encounter (CMS-HCC)    2. ESRD (end stage renal disease) (Multi)          DISPOSITION    Admit 01/09/2025 05:28:12 PM  As a result of their workup, the patient will require admission to the hospital.  The patient was informed of her diagnosis.  The patient was given the opportunity to ask questions and I answered them. The patient agreed to be admitted to the hospital.    Critical Care time: Not Indicated    (Comment: Please note this report has been produced using speech recognition software and may contain errors related to that system including errors in grammar, punctuation, and spelling, as well as words  and phrases that may be inappropriate.  If there are any questions or concerns please feel free to contact the dictating provider for clarification.)    Karly Brownlee DO (electronically signed)  Emergency Medicine Physician    History provided by: Patient and EMS  Limitations to History: Altered Mental Status  External Records Reviewed with Brief Summary: Discharge Summary from 1/7/25 which showed admission to [facility] with last HD on 1/6/25  Social Determinants of Health which Significantly Impact Care:  resides in SNF    EKG Independent Interpretation: EKG not obtained  Independent Result Review and Interpretation: Relevant laboratory and radiographic results were reviewed and independently interpreted by myself.  As necessary, they are commented on in the ED Course.  Chronic conditions affecting the patient's care: As documented above in MDM  The patient was discussed with the following consultants/services: Hospitalist/Admitting Provider who accepted the patient for admission, [facility] NP  Care Considerations: As documented above in MDM               Karly Brownlee DO  01/09/25 [id number]

## 2025-01-09 NOTE — PROGRESS NOTES
"Pharmacy Medication History Review    Daphne Morris is a 69 y.o. female admitted for Problem with vascular access. Pharmacy reviewed the patient's ornbc-rj-kohpzynxt medications and allergies for accuracy.    Medications ADDED:  N/A  Medications CHANGED:  N/A  Medications REMOVED:   Freestyle Shakir 3 reader  Freestyle Shakir 3 sensor    The list below reflects the updated PTA list.   Prior to Admission Medications   Prescriptions Last Dose Informant Patient Reported? Taking?   BD Calista 2nd Gen Pen Needle 32 gauge x 5/32\" needle   No No   Sig: USE SUBCUTANEOUSLY AS DIRECTED TWICE DAILY   FreeStyle Shakir 14 Day Sensor kit   No No   Sig: USE AS DIRECTED TO CHECK SUGARS 3 TO 4 TIMES DAILY   FreeStyle Shakir 2 [place] misc Unknown  No No   Sig: Use as instructed   FreeStyle Shakir 3 [place] misc   No No   Sig: Use as instructed   FreeStyle Shakir 3 Sensor device   No No   Sig: Change sensor Q 14 days   HumaLOG KwikPen Insulin 100 unit/mL injection Unknown Self No No   Sig: up to 15 units Subcutaneous three times a day per sliding scaleup to 15 units Subcutaneous three times a day per sliding scale   Patient taking differently: up to 15 units Subcutaneous three times a day per sliding scale.      151-200 2 units  201-250 4 units  251-300 6 units  301-350 8 units  351-400 10 units   acetaminophen (Tylenol) 500 mg tablet Unknown  Yes No   Sig: Take 2 tablets (1,000 mg) by mouth 2 times a day.   albuterol 90 mcg/actuation inhaler Unknown  No No   Sig: INHALE 2 PUFFS BY MOUTH EVERY 4 HOURS AS NEEDED   amLODIPine (Norvasc) 5 mg tablet Unknown  Yes No   Sig: Take 1 tablet (5 mg) by mouth once daily.   amiodarone (Pacerone) 200 mg tablet Unknown  No No   Sig: TAKE 1 TABLET BY MOUTH ONCE DAILY WITH BREAKFAST.   aspirin 81 mg EC tablet Unknown  Yes No   Sig: Take 1 tablet (81 mg) by mouth once daily.   atorvastatin (Lipitor) 40 mg tablet Unknown  No No   Sig: TAKE ONE TABLET BY MOUTH EVERY DAY   clopidogrel (Plavix) 75 mg tablet " Unknown  Yes No   Sig: Take 1 tablet (75 mg) by mouth once daily.   ferrous gluconate 324 (38 Fe) mg tablet Unknown  No No   Sig: Take 1 tablet (324 mg) by mouth once daily with breakfast.   ipratropium-albuteroL (Duo-Neb) 0.5-2.5 mg/3 mL nebulizer solution Unknown  No No   Sig: INHALE THE CONTENTS OF 1 VIAL VIA NEBULIZER EVERY 6 HOURS AS NEEDED.   levothyroxine (Synthroid, Levoxyl) 200 mcg tablet Unknown  No No   Sig: Take 1 tablet by mouth once daily in the morning before a meal.   levothyroxine (Synthroid, Levoxyl) 50 mcg tablet Unknown  No No   Sig: Take 1 tablet (50 mcg) by mouth once daily in the morning. Take before meals.   metoprolol succinate XL (Toprol-XL) 50 mg 24 hr tablet Unknown  No No   Sig: TAKE ONE TABLET BY MOUTH TWO TIMES A DAY   oxygen DME/Hospice (O2) therapy   Yes No   Sig: Inhale 3 L/min once daily at bedtime. Indications: decreased oxygen in the tissues or blood   pantoprazole (ProtoNix) 40 mg EC tablet Unknown  No No   Sig: TAKE ONE TABLET BY MOUTH TWO TIMES A DAY   venlafaxine XR (Effexor-XR) 75 mg 24 hr capsule Unknown  No No   Sig: Take 2 capsules (150 mg) by mouth once daily.      Facility-Administered Medications: None        The list below reflects the updated allergy list. Please review each documented allergy for additional clarification and justification.  Allergies  Reviewed by CARLENE Nelson-FELICIA on 1/9/2025        Severity Reactions Comments    Bee Venom Protein (honey Bee) Medium Swelling     Citalopram Medium Hives, Other, Rash, Nausea/vomiting Facial breakout, blisters, and rash    Codeine Medium Headache, Other, Itching Migraines    Influenza Virus Vaccines Medium Hives     Latex Medium Other blisters    Latex, Natural Rubber Medium Other blisters    Lisinopril Medium Swelling, Nausea/vomiting Facial swelling    Penicillins Medium Swelling, Headache, Unknown     Adhesive Tape-silicones Low Other blisters    Amoxicillin Low Swelling, Rash     Doxycycline Low Rash, Unknown  "    Oseltamivir Low Rash, Nausea/vomiting     Phenyleph-min Oil-petrolatum Low Other     Phenyleph-shark Oil-glyc-pet Low Rash     Phenylephrine Low Nausea/vomiting     Prednisone Low Other, Nausea/vomiting Yeast infection    Tuberculin Ppd Low Unknown     Xylometazoline Low Unknown             Patient was unable to be assessed for M2B at discharge.     Sources:   Chart Review     Additional Comments:  Per chart review confirmed patient is taking both levothyroxine 200 mcg and levothyroxine 50 mcg for a total of 250 mcg.  Last doses wasn't able to be confirmed.   Med rec completed off most recent discharge report.       Jose Coronado  Pharmacy Technician  01/09/25     Secure Chat preferred   If no response call [phone] or [employer] \"Med Rec\"   "

## 2025-01-09 NOTE — CONSULTS
Reason For Consult  Dysfunctional brachial axillary graft    History Of Present Illness  Daphne Morris is a 69 y.o. female presenting with hyperkalemia.  The patient has a history of end-stage renal disease requiring hemodialysis and had a right brachial axillary loop graft placed 2024.  This was an AcuSeal graft.  The patient had  dialysis on Tuesday but was unable to get dialysis today.  She was referred to the emergency room for evaluation of her AV graft.    past Medical History  She has a past medical history of Anal fissure (10/12/2023), Anemia, ASHD (arteriosclerotic heart disease), At risk for falls, Atrial fibrillation (Multi), CHF (congestive heart failure), CKD (chronic kidney disease), COPD (chronic obstructive pulmonary disease) (Multi), CVA (cerebral vascular accident) (Multi), Depression, Diabetes mellitus (Multi), GERD (gastroesophageal reflux disease), H/O pleural effusion, Hyperlipidemia, Hypertension, Hypothyroidism, Hypoxia, Impacted cerumen, left ear, Noncompliance, Osteoarthritis, Perianal dermatitis (10/12/2023), Personal history of other diseases of the respiratory system, PVD (peripheral vascular disease) (CMS-HCC), Renal carcinoma, right (Multi), Respiratory failure (Multi), TIA (transient ischemic attack), Vasculitis (CMS-HCC) (10/12/2023), and Weakness.    Surgical History  She has a past surgical history that includes MR angio chest w and wo IV contrast (2023); MR angio head wo IV contrast (2021);  section, classic; Shoulder surgery; Ankle surgery; Cataract extraction (Right); Nephrectomy (2021); and Cardiac catheterization (N/A, 2024).     Social History  She reports that she has been smoking cigarettes. She started smoking about 56 years ago. She has a 28 pack-year smoking history. She has never been exposed to tobacco smoke. She has never used smokeless tobacco. She reports that she does not currently use alcohol. She reports that she does not  "currently use drugs after having used the following drugs: Marijuana.    Family History  Family History   Problem Relation Name Age of Onset    Hypertension Mother      Diabetes Father      Heart disease Father      Cancer Sister      Cancer Brother      Diabetes Daughter      Asthma Daughter      Heart attack Daughter      Diabetes Maternal Grandfather      Heart disease Paternal Grandmother      Diabetes Other      Hypertension Other      COPD Other      Other (chronic lung disease) Other          Allergies  Bee venom protein (honey bee); Citalopram; Codeine; Influenza virus vaccines; Latex; Latex, natural rubber; Lisinopril; Penicillins; Adhesive tape-silicones; Amoxicillin; Doxycycline; Oseltamivir; Phenyleph-min oil-petrolatum; Phenyleph-shark oil-glyc-pet; Phenylephrine; Prednisone; Tuberculin ppd; and Xylometazoline    Review of Systems  Unable to obtain review of systems as the patient is confused     Physical Exam  General: Awake, alert, follows commands.  Confused at times.  HEENT: Head is atraumatic, normocephalic.   Cardiac: Normal S1-S2.  Regular rate and rhythm.  No murmurs.  Respiratory: Lungs clear to auscultation.  No adventitious sounds.  Abdomen: Soft, nondistended, nontender.  Bowel sounds x4 quadrants.  Pulse exam: Palpable brachial and radial pulses bilaterall.   femoral, popliteal, pedal pulses are palpable bilaterally.  Extremities:  left brachial axillary graft has no audible thrill or bruit  Neuro: Moves all extremities spontaneously.  No focal deficits.  Psych: Appropriate affect.  Answers questions appropriately.     Last Recorded Vitals  Blood pressure 130/55, pulse 62, temperature 36.6 °C (97.9 °F), temperature source Temporal, resp. rate 18, height 1.676 m (5' 6\"), weight 70.3 kg (155 lb), SpO2 95%.    Relevant Results  XR chest 1 view    Result Date: 1/9/2025  Interpreted By:  Sujit Rebolledo, STUDY: XR CHEST 1 VIEW; ;  1/9/2025 9:33 am   INDICATION: Signs/Symptoms:dyspnea, on " HD.   COMPARISON: 01/01/2025   ACCESSION NUMBER(S): [id number]   ORDERING CLINICIAN: CHANDRIKA CAMARILLO   TECHNIQUE: A single AP radiograph of the chest is performed.   FINDINGS: The heart is enlarged. There is mild vascular and interstitial congestion. There is a moderate size right pleural effusion with underlying atelectasis or consolidation. There is increasing consolidation and/or atelectasis in the left lung base with small left pleural effusion. There is no pneumothorax.       Persistent cardiomegaly with vascular and interstitial congestion.   Bilateral pleural effusions and parenchymal opacity, greater on the right. These findings have increased in the interval. Continued clinical and radiographic follow-up is recommended.     MACRO: None   Signed by: Sujit Rebolledo 1/9/2025 9:46 AM Dictation workstation:   [id number]    ECG 12 lead    Result Date: 1/8/2025  Normal sinus rhythm Left axis deviation Left bundle branch block Abnormal ECG When compared with ECG of 26-DEC-2024 08:17, Left bundle branch block is now Present Criteria for Anterior infarct are no longer Present Criteria for Anterolateral infarct are no longer Present Criteria for Inferior infarct are no longer Present See ED provider note for full interpretation and clinical correlation Confirmed by Wanda Banegas ([id number]) on 1/8/2025 7:27:31 PM    ECG 12 lead    Result Date: 1/8/2025  Sinus rhythm with 1st degree AV block Left axis deviation Left bundle branch block Abnormal ECG When compared with ECG of 29-DEC-2024 17:49, (unconfirmed) Sinus rhythm has replaced Wide QRS rhythm See ED provider note for full interpretation and clinical correlation Confirmed by Wanda Banegas ([id number]) on 1/8/2025 7:24:03 PM    ECG 12 lead    Result Date: 1/7/2025  Wide QRS rhythm Left axis deviation Left bundle branch block Abnormal ECG When compared with ECG of 26-DEC-2024 08:17, Wide QRS rhythm has replaced Sinus rhythm See ED provider note for full  interpretation and clinical correlation Confirmed by Rose Viera ([id number]) on 1/7/2025 3:02:24 PM    Electrocardiogram, 12-lead PRN ACS symptoms    Result Date: 1/6/2025  Sinus rhythm Left axis deviation Left bundle branch block Abnormal ECG Confirmed by Sujit Hernandez ([id number]) on 1/6/2025 9:40:53 AM    US thoracentesis    Result Date: 1/3/2025  Interpreted By:  Danielle Hutson, STUDY: US THORACENTESIS; 1/3/788347:51 pm   INDICATION: Signs/Symptoms:Right pleural effusion.   COMPARISON: None.   ACCESSION NUMBER(S): [id number]   ORDERING CLINICIAN: ANGEL RAMOS   TECHNIQUE: INTERVENTIONALIST: Danielle Hutson   CONSENT: The patient/patient's POA/next of kin was informed of the nature of the proposed procedure. Risks were discussed including but not limited to bleeding, infection, pain at insertion site, or pneumo/hemothorax requiring chest tube placement. Purpose of treatment and alternative options were also reviewed. All questions were answered and patient agreed to proceed.   SEDATION: None   MEDICATION/CONTRAST: Lidocaine 1%.   TIME OUT: A time out was performed immediately prior to procedure start with the interventional team, correctly identifying the patient name, date of birth, MRN, procedure, anatomy (including marking of site and side), patient position, procedure consent form, relevant laboratory and imaging test results, antibiotic administration, safety precautions, and procedure-specific equipment needs.   FINDINGS: The patient was placed in the left lateral decubitus position.   The patient's right pleural space was scanned using the ultrasound probe. The area of fluid most amenable to drainage was marked. Color doppler was used to assess for vasculature in the intended field.   The patient's skin was sterilized using chlorhexidine and draped in sterile manner. The skin was anesthestized with 1% lidocaine.  Under real time ultrasound guidance, a 5F valved centesis catheter was inserted into  the right pleural space where previously marked. A total of 1,000 mL of clear yellow pleural fluid was removed. The catheter was then removed and a sterile op site was placed over the insertion site. Sterile technique used throughout entirety of procedure.   Specimens were obtained, labeled and sent to lab with requisitions ordered by primary team.   The patient tolerated the procedure well and there were no immediate complications.       Uneventful thoracentesis, as detailed above: Right Pleural space, 1,000 mL   Performed and dictated at [facility].   Signed by: Danielle Hutson 1/3/2025 12:51 PM Dictation workstation:   [id number]    XR chest 1 view    Result Date: 1/1/2025  Interpreted By:  Beulah Chacon, STUDY: XR CHEST 1 VIEW;  1/1/2025 3:36 am   INDICATION: Signs/Symptoms:respiratory failure, Influenza     COMPARISON: Chest x-ray 12/29/2024   ACCESSION NUMBER(S): [id number]   ORDERING CLINICIAN: ANTHONY CASH   TECHNIQUE: Portable semi-upright frontal view of the chest was obtained .   FINDINGS: Monitoring wires are overlying the patient.   The heart is enlarged. There is a left atrial appendage closure device. There is mild vascular congestion. Atherosclerotic calcifications are present at the aortic arch.   There is a moderate size right pleural effusion with atelectasis/consolidation at the right lung base. No pneumothorax.       1. Cardiomegaly. Mild vascular congestion. Moderate right pleural effusion with atelectasis/consolidation at the right lung base.       MACRO: None.   Signed by: Beulah Chacon 1/1/2025 3:48 AM Dictation workstation:   [id number]    XR forearm right 2 views    Result Date: 12/31/2024  STUDY: Forearm Radiographs; 12/31/2024 11:11AM INDICATION: Fall, skin tear to the right arm. COMPARISON: 4/29/2024 XR Forearm right. ACCESSION NUMBER(S): [id number] ORDERING CLINICIAN: MEAGAN ELY TECHNIQUE:  Two view(s) of the right forearm.  FINDINGS:  There is no displaced fracture.  The alignment is anatomic.  Soft tissue swelling about the mid forearm noted..    No acute osseous findings. Signed by Ashish Otero II, MD    CT cervical spine wo IV contrast    Result Date: 12/31/2024  Interpreted By:  Vinh Bautista, STUDY: CT CERVICAL SPINE WO IV CONTRAST; 12/31/2024 11:05 am   INDICATION: Signs/Symptoms:fall.   COMPARISON: CT dated 05/14/2024   ACCESSION NUMBER(S): [id number]   ORDERING CLINICIAN: MEAGAN ELY   TECHNIQUE: Contiguous axial CT images were obtained at  2 mm slice thickness through the cervical spine without contrast administration. The images were then reconstructed in the coronal and sagittal planes.   FINDINGS: There is no CT evidence of acute fracture identified. No prevertebral soft tissue swelling is identified.   ALIGNMENT: The vertebral bodies are well aligned without evidence of subluxation.   VERTEBRAE/DISC SPACES: Moderate disc space narrowing and marginal osteophyte formation is seen at the C6-7 level. Moderate to severe facet degenerative changes are seen throughout the cervical spine.   ADDITIONAL FINDINGS: Evaluation of the visualized soft tissues of the neck is limited by the lack of intravenous contrast.  Within this limitation, no gross mass or lymphadenopathy is identified.       1.  No CT evidence of acute fracture. 2.  Degenerative changes throughout the cervical spine, as above.   Signed by: Vinh Bautista 12/31/2024 11:47 AM Dictation workstation:   [id number]    CT head wo IV contrast    Result Date: 12/31/2024  Interpreted By:  Vinh Bautista, STUDY: CT HEAD WO IV CONTRAST; 12/31/2024 11:05 am   INDICATION: Signs/Symptoms:fall.   COMPARISON: CT head dated 05/14/2024   ACCESSION NUMBER(S): [id number]   ORDERING CLINICIAN: MEAGAN ELY   TECHNIQUE: Contiguous axial CT images were obtained through the head at 5 mm slice thickness without contrast administration.   FINDINGS: INTRACRANIAL: The ventricles,  sulci and basal cisterns are within normal limits for size and configuration. The grey-white differentiation is intact. There is no mass effect or midline shift. There is no extraaxial fluid collection. There is no intracranial hemorrhage.  The calvarium is unremarkable.   EXTRACRANIAL: Visualized paranasal sinuses and mastoids are clear.       No evidence of acute cortical infarct or intracranial hemorrhage.   MACRO: None     Signed by: Vinh Bautista 12/31/2024 11:41 AM Dictation workstation:   [id number]    XR chest 1 view    Result Date: 12/29/2024  Interpreted By:  Diomedes Peng, STUDY: XR CHEST 1 VIEW;  12/29/2024 6:38 pm   INDICATION: Signs/Symptoms:increased sob.   COMPARISON: 12/26/2024   ACCESSION NUMBER(S): [id number]   ORDERING CLINICIAN: AFSANEH ADKINS   FINDINGS: There is stable cardiomegaly. There is pulmonary vascular congestive change. There is right pleural effusion and right-sided atelectasis airspace disease. No pneumothorax.       Cardiomegaly with pulmonary vascular congestive change, right pleural effusion and right side atelectasis or airspace disease.   MACRO: None   Signed by: Diomedes Peng 12/29/2024 6:49 PM Dictation workstation:   [id number]    Vascular US Carotid Artery Duplex Bilateral    Result Date: 12/29/2024          [place] [address]  Tel [phone] and Fax [phone]  Vascular Lab Report VASC US CAROTID ARTERY DUPLEX BILATERAL  Patient Name:      MOE OTERO ZULMA      Reading Physician:  [id number] Robert Henderson MD, RPVI Study Date:        12/27/2024           Ordering Physician: [id number] TONIA MONTANA MRN/PID:           [id number]             Technologist:       Porsche Marin                                                             T Accession#:        [id number]         Technologist 2: Date of Birth/Age:  1955 / 69 years Encounter#:         [id number] Gender:            F Admission Status:  Inpatient            Location Performed: [facility]  Diagnosis/ICD: Syncope and collapse-R55 CPT Codes:     67124 Cerebrovascular Carotid Duplex scan complete  CONCLUSIONS: Right Carotid: Findings are consistent with less than 50% stenosis of the right proximal internal carotid artery. Right external carotid artery appears patent with no evidence of stenosis. The right vertebral artery is patent with antegrade flow. No evidence of hemodynamically significant stenosis in the right subclavian artery. Dampened waveform noted in the right subclavian artery may be suggestive of a more proximal stenosis. Left Carotid: Findings are consistent with less than 50% stenosis of the left proximal internal carotid artery. Laminar flow seen by color Doppler. Left external carotid artery appears patent with no evidence of stenosis. The left vertebral artery is patent with antegrade flow. There is a >50% stenosis noted in the left subclavian artery.  Additional Findings: Technically difficult study due to patient respirations.  Imaging & Doppler Findings: Right Plaque Morph: The proximal right internal carotid artery demonstrates heterogenous and calcified plaque. The proximal right external carotid artery demonstrates heterogenous and calcified plaque. The mid right common carotid artery demonstrates heterogenous and calcified plaque. The distal right common carotid artery demonstrates heterogenous and calcified plaque. Left Plaque Morph: The proximal left internal carotid artery demonstrates heterogenous and calcified plaque. The proximal left external carotid artery demonstrates heterogenous and calcified plaque. The mid left common carotid artery demonstrates heterogenous and calcified plaque. The distal left common carotid artery demonstrates heterogenous and calcified plaque.   Right                        Left   PSV      EDV                 PSV      EDV 71 cm/s            CCA P    75 cm/s 83 cm/s            CCA D    78 cm/s 123 cm/s 24 cm/s   ICA P    117 cm/s 25 cm/s 145 cm/s           ICA M    109 cm/s 108 cm/s           ICA D    89 cm/s 110 cm/s            ECA     131 cm/s 66 cm/s          Vertebral  50 cm/s 191 cm/s         Subclavian 270 cm/s               Right Left ICA/CCA Ratio  1.5  1.5   [id number] Robert Henderson MD, RPVI Electronically signed by [id number] Robert Henderson MD, RPVI on 12/29/2024 at 3:11:38 PM  ** Final **     ECG 12 lead    Result Date: 12/28/2024  Normal sinus rhythm Left axis deviation Minimal voltage criteria for LVH, may be normal variant ( Yung product ) Inferior infarct , age undetermined Anterolateral infarct (cited on or before 26-JUN-2024) Abnormal ECG When compared with ECG of 20-DEC-2024 10:53, No significant change was found See ED provider note for full interpretation and clinical correlation Confirmed by Rose Viera ([id number]) on 12/28/2024 9:59:04 AM    Transthoracic Echo Limited    Result Date: 12/27/2024   [facility], [address] [place] [address]               Tel [phone] and Fax [phone] TRANSTHORACIC ECHOCARDIOGRAM REPORT  Patient Name:       MOE OTERO ZULMA     Reading Physician:    [id number] Nicholas Antonio MD Study Date:         12/27/2024          Ordering Provider:    [id number] DOMONIQUE PURCELL MRN/PID:            [id number]            Fellow: Accession#:         [id number]        Nurse: Date of Birth/Age:  1955 / 69      Sonographer:          Nina mclain                                     RDNALLELY Gender assigned at  F                   Additional Staff: Birth: Height:             167.64 cm           Admit Date:           12/26/2024 Weight:             71.21 kg            Admission Status:     Inpatient -                                                                Routine BSA / BMI:          1.80 m2 / 25.34     Encounter#:           [id number]                     kg/m2 Blood Pressure:     158/90 mmHg         Department Location:  [facility] Non                                                               Invasive Study Type:    TRANSTHORACIC ECHO (TTE) LIMITED Diagnosis/ICD: Syncope and collapse-R55; Other pericardial effusion                (noninflammatory)-I31.39 Indication:    Syncope, Pericardial effusion CPT Code:      Echo Limited-48641; Doppler Limited-64831; Color Doppler-90676 Patient History: Smoker:            Current. Diabetes:          Yes Pertinent History: CHF, HTN, PVD, Hyperlipidemia, A-Fib, CVA, CAD and COPD. Study Detail: The following Echo studies were performed: 2D, Doppler and color               flow.  PHYSICIAN INTERPRETATION: Left Ventricle: The left ventricular systolic function is normal, with a visually estimated ejection fraction of 55-60%. There is severely increased concentric left ventricular hypertrophy. There are no regional left ventricular wall motion abnormalities. The left ventricular cavity size is normal. There is severely increased septal and severely increased posterior left ventricular wall thickness. Spectral Doppler shows a Grade II (pseudonormal pattern) of left ventricular diastolic filling with an elevated left atrial pressure. Left Atrium: The left atrium is moderately dilated. Right Ventricle: The right ventricle is normal in size. There is normal right ventricular global systolic function. Right Atrium: The right atrium is mildly dilated. Aortic Valve: The aortic valve is trileaflet. There is no evidence of aortic valve regurgitation. Mitral Valve: The mitral valve is normal in structure. There is mild mitral valve regurgitation. Tricuspid Valve: The tricuspid valve is structurally normal. No evidence of tricuspid regurgitation. Pulmonic Valve: The pulmonic valve is  structurally normal. There is physiologic pulmonic valve regurgitation. Pericardium: Moderate pericardial effusion. The effusion is circumferential. There is no evidence of cardiac tamponade. Aorta: The aortic root is normal. Pulmonary Veins: The pulmonary veins appear normal and return normally to the left atrium. Systemic Veins: The inferior vena cava appears normal in size, with IVC inspiratory collapse greater than 50%.  CONCLUSIONS:  1. The left ventricular systolic function is normal, with a visually estimated ejection fraction of 55-60%.  2. Spectral Doppler shows a Grade II (pseudonormal pattern) of left ventricular diastolic filling with an elevated left atrial pressure.  3. There is severely increased septal and severely increased posterior left ventricular wall thickness.  4. There is severely increased concentric left ventricular hypertrophy.  5. There is normal right ventricular global systolic function.  6. The left atrium is moderately dilated.  7. Moderate pericardial effusion.  8. There is no evidence of cardiac tamponade. QUANTITATIVE DATA SUMMARY:  2D MEASUREMENTS:          Normal Ranges: IVSd:            1.81 cm  (0.6-1.1cm) LVPWd:           1.72 cm  (0.6-1.1cm) LVIDd:           3.39 cm  (3.9-5.9cm) LVIDs:           2.54 cm LV Mass Index:   135 g/m2 LVEDV Index:     32 ml/m2 LV % FS          25.0 %  LV SYSTOLIC FUNCTION BY 2D PLANIMETRY (MOD):                      Normal Ranges: EF-A4C View:    55 % (>=55%) EF-A2C View:    55 % EF-Biplane:     56 % EF-Visual:      58 % LV EF Reported: 58 %  LV DIASTOLIC FUNCTION:          Normal Ranges: MV Peak E:             1.02 m/s (0.7-1.2 m/s) MV Peak A:             0.40 m/s (0.42-0.7 m/s) E/A Ratio:             2.53     (1.0-2.2)  MITRAL VALVE:          Normal Ranges: MV DT:        178 msec (150-240msec)  TRICUSPID VALVE/RVSP:          Normal Ranges: Peak TR Velocity:     2.22 m/s RV Syst Pressure:     23 mmHg  (< 30mmHg)  [id number] Nicholas Antonio MD  Electronically signed on 12/27/2024 at 4:49:23 PM  ** Final **     ECG 12 lead    Result Date: 12/26/2024  Normal sinus rhythm Left axis deviation Anterolateral infarct (cited on or before 26-JUN-2024) Abnormal ECG When compared with ECG of 26-JUN-2024 07:13, No significant change was found See ED provider note for full interpretation and clinical correlation Confirmed by Rose Viera ([id number]) on 12/26/2024 10:31:18 AM    XR chest 1 view    Result Date: 12/26/2024  STUDY: Chest Radiograph;  12/26/2024 8:52 AM INDICATION: Shortness of breath.  Syncope. COMPARISON: Chest, single portable view obtained on 05/19/2024 at 09:50 hours.  XR Chest 04/27/2024. ACCESSION NUMBER(S): [id number] ORDERING CLINICIAN: BRENDON WEN TECHNIQUE:  Frontal chest was obtained at 08:51 hours. FINDINGS: CARDIOMEDIASTINAL SILHOUETTE: Heart is enlarged with increased pulmonary vascularity.  LUNGS: Right-sided pleural effusion slightly increased in size compared to 5/14/2024 with associated right basilar atelectasis and/or consolidation.  ABDOMEN: No remarkable upper abdominal findings.  BONES: No acute osseous changes.    Right-sided pleural effusion slightly increased in size compared to 5/14/2024 with associated right basilar atelectasis and/or consolidation. Signed by Zane Culver MD    XR knee left 4+ views    Result Date: 12/20/2024  Interpreted By:  Lam Cheatham, STUDY: XR KNEE LEFT 4+ VIEWS   INDICATION: Signs/Symptoms:fall, left knee pain.   COMPARISON: February 2, 2024   ACCESSION NUMBER(S): [id number]   ORDERING CLINICIAN: FADY CHAPPELL   FINDINGS: Fairly advanced osteoarthritis of the left knee.   Moderate-sized joint effusion. No evidence of acute fracture.       Moderate arthrosis of the left knee with a joint effusion. No acute osseous abnormality.   Signed by: Lam Cheatham 12/20/2024 11:40 AM Dictation workstation:   [id number]    XR chest 2 views    Result Date: 12/16/2024  Interpreted By:  Lam Cheatham, STUDY: XR  CHEST 2 VIEWS   INDICATION: Signs/Symptoms:PA/Lat, SOB, pleural effusion.   COMPARISON: May 19   ACCESSION NUMBER(S): [id number]   ORDERING CLINICIAN: ELDER ARANDA   FINDINGS: Increasing right pleural effusion and basilar airspace disease. This could be pneumonia with parapneumonic effusion or some asymmetric edema.   No pneumothorax. Cardiomegaly unchanged.       Increasing right pleural effusion and basilar airspace disease. This could be pneumonia with parapneumonic effusion or some asymmetric edema.   Signed by: Lam Cheatham 12/16/2024 7:04 AM Dictation workstation:   [id number]       Assessment/Plan    End-stage renal disease requiring hemodialysis  Clotted brachial axillary loop graft    I was asked to evaluate this patient in the emergency room secondary to a clotted brachial axillary loop graft.  The patient was able to get dialysis on Tuesday but not on Thursday (today).  On physical exam, her graft is indeed occluded with no thrill or bruit.    Recommend transfer to [facility] Clinic or possibly a boomerang to [facility] from [facility] for IR consultation and graft thrombectomy.  She may also need a tunneled dialysis catheter if this is not successful.  Currently, her electrolytes are within normal limits.    I spent 35 minutes in the professional and overall care of this patient.      Lewis Norwood, APRN-CNP

## 2025-01-10 ENCOUNTER — APPOINTMENT (OUTPATIENT)
Dept: RADIOLOGY | Facility: HOSPITAL | Age: 70
DRG: 252 | End: 2025-01-10
Payer: MEDICARE

## 2025-01-10 ENCOUNTER — APPOINTMENT (OUTPATIENT)
Dept: DIALYSIS | Facility: HOSPITAL | Age: 70
End: 2025-01-10
Payer: MEDICARE

## 2025-01-10 ENCOUNTER — TELEPHONE (OUTPATIENT)
Dept: PRIMARY CARE | Facility: CLINIC | Age: 70
End: 2025-01-10

## 2025-01-10 ENCOUNTER — HOSPITAL ENCOUNTER (INPATIENT)
Facility: HOSPITAL | Age: 70
DRG: 252 | End: 2025-01-10
Attending: HOSPITALIST | Admitting: NURSE PRACTITIONER
Payer: MEDICARE

## 2025-01-10 VITALS
HEIGHT: 66 IN | DIASTOLIC BLOOD PRESSURE: 61 MMHG | HEART RATE: 66 BPM | TEMPERATURE: 97.7 F | BODY MASS INDEX: 24.91 KG/M2 | SYSTOLIC BLOOD PRESSURE: 138 MMHG | WEIGHT: 155 LBS | OXYGEN SATURATION: 97 % | RESPIRATION RATE: 24 BRPM

## 2025-01-10 DIAGNOSIS — N18.6 ESRD (END STAGE RENAL DISEASE) (MULTI): Primary | ICD-10-CM

## 2025-01-10 DIAGNOSIS — Z99.2 CKD (CHRONIC KIDNEY DISEASE) STAGE V REQUIRING CHRONIC DIALYSIS (MULTI): ICD-10-CM

## 2025-01-10 DIAGNOSIS — N18.6 END STAGE RENAL DISEASE (MULTI): ICD-10-CM

## 2025-01-10 DIAGNOSIS — N18.6 CKD (CHRONIC KIDNEY DISEASE) STAGE V REQUIRING CHRONIC DIALYSIS (MULTI): ICD-10-CM

## 2025-01-10 LAB
ALBUMIN SERPL BCP-MCNC: 2.9 G/DL (ref 3.4–5)
ANION GAP BLDA CALCULATED.4IONS-SCNC: 5 MMO/L (ref 10–25)
ANION GAP BLDV CALCULATED.4IONS-SCNC: 10 MMOL/L (ref 10–25)
ANION GAP BLDV CALCULATED.4IONS-SCNC: 7 MMOL/L (ref 10–25)
ANION GAP SERPL CALCULATED.3IONS-SCNC: 10 MMOL/L (ref 10–20)
ANION GAP SERPL CALCULATED.3IONS-SCNC: 17 MMOL/L (ref 10–20)
APPARATUS: ABNORMAL
ARTERIAL PATENCY WRIST A: POSITIVE
BASE EXCESS BLDA CALC-SCNC: 1.4 MMOL/L (ref -2–3)
BASE EXCESS BLDV CALC-SCNC: -0.7 MMOL/L (ref -2–3)
BASE EXCESS BLDV CALC-SCNC: 2.5 MMOL/L (ref -2–3)
BODY TEMPERATURE: 37 DEGREES CELSIUS
BUN SERPL-MCNC: 16 MG/DL (ref 6–23)
BUN SERPL-MCNC: 48 MG/DL (ref 6–23)
CA-I BLD-SCNC: 1.07 MMOL/L (ref 1.1–1.33)
CA-I BLDA-SCNC: 1.17 MMOL/L (ref 1.1–1.33)
CA-I BLDV-SCNC: 1.13 MMOL/L (ref 1.1–1.33)
CA-I BLDV-SCNC: 1.13 MMOL/L (ref 1.1–1.33)
CALCIUM SERPL-MCNC: 7.6 MG/DL (ref 8.6–10.3)
CALCIUM SERPL-MCNC: 7.9 MG/DL (ref 8.6–10.3)
CHLORIDE BLDA-SCNC: 92 MMOL/L (ref 98–107)
CHLORIDE BLDV-SCNC: 90 MMOL/L (ref 98–107)
CHLORIDE BLDV-SCNC: 95 MMOL/L (ref 98–107)
CHLORIDE SERPL-SCNC: 91 MMOL/L (ref 98–107)
CHLORIDE SERPL-SCNC: 96 MMOL/L (ref 98–107)
CO2 SERPL-SCNC: 23 MMOL/L (ref 21–32)
CO2 SERPL-SCNC: 31 MMOL/L (ref 21–32)
CREAT SERPL-MCNC: 1.75 MG/DL (ref 0.5–1.05)
CREAT SERPL-MCNC: 3.83 MG/DL (ref 0.5–1.05)
CRITICAL CALL TIME: 1237
CRITICAL CALLED BY: ABNORMAL
CRITICAL CALLED TO: ABNORMAL
CRITICAL NOTE: ABNORMAL
CRITICAL READ BACK: ABNORMAL
EGFRCR SERPLBLD CKD-EPI 2021: 12 ML/MIN/1.73M*2
EGFRCR SERPLBLD CKD-EPI 2021: 31 ML/MIN/1.73M*2
ERYTHROCYTE [DISTWIDTH] IN BLOOD BY AUTOMATED COUNT: 13.2 % (ref 11.5–14.5)
GLUCOSE BLD MANUAL STRIP-MCNC: 103 MG/DL (ref 74–99)
GLUCOSE BLD MANUAL STRIP-MCNC: 111 MG/DL (ref 74–99)
GLUCOSE BLD MANUAL STRIP-MCNC: 152 MG/DL (ref 74–99)
GLUCOSE BLD MANUAL STRIP-MCNC: 200 MG/DL (ref 74–99)
GLUCOSE BLD MANUAL STRIP-MCNC: 27 MG/DL (ref 74–99)
GLUCOSE BLD MANUAL STRIP-MCNC: 56 MG/DL (ref 74–99)
GLUCOSE BLD MANUAL STRIP-MCNC: 57 MG/DL (ref 74–99)
GLUCOSE BLD MANUAL STRIP-MCNC: 79 MG/DL (ref 74–99)
GLUCOSE BLDA-MCNC: 60 MG/DL (ref 74–99)
GLUCOSE BLDV-MCNC: 66 MG/DL (ref 74–99)
GLUCOSE BLDV-MCNC: 79 MG/DL (ref 74–99)
GLUCOSE SERPL-MCNC: 35 MG/DL (ref 74–99)
GLUCOSE SERPL-MCNC: 63 MG/DL (ref 74–99)
HCO3 BLDA-SCNC: 30 MMOL/L (ref 22–26)
HCO3 BLDV-SCNC: 28.5 MMOL/L (ref 22–26)
HCO3 BLDV-SCNC: 31 MMOL/L (ref 22–26)
HCT VFR BLD AUTO: 34.2 % (ref 36–46)
HCT VFR BLD EST: 31 % (ref 36–46)
HCT VFR BLD EST: 31 % (ref 36–46)
HCT VFR BLD EST: 33 % (ref 36–46)
HGB BLD-MCNC: 10.5 G/DL (ref 12–16)
HGB BLDA-MCNC: 10.3 G/DL (ref 12–16)
HGB BLDV-MCNC: 10.3 G/DL (ref 12–16)
HGB BLDV-MCNC: 10.9 G/DL (ref 12–16)
INHALED O2 CONCENTRATION: 36 %
INHALED O2 CONCENTRATION: 40 %
INHALED O2 CONCENTRATION: 40 %
LACTATE BLDA-SCNC: 0.3 MMOL/L (ref 0.4–2)
LACTATE BLDV-SCNC: 0.4 MMOL/L (ref 0.4–2)
LACTATE BLDV-SCNC: 0.6 MMOL/L (ref 0.4–2)
MAGNESIUM SERPL-MCNC: 1.77 MG/DL (ref 1.6–2.4)
MCH RBC QN AUTO: 30.3 PG (ref 26–34)
MCHC RBC AUTO-ENTMCNC: 30.7 G/DL (ref 32–36)
MCV RBC AUTO: 99 FL (ref 80–100)
NRBC BLD-RTO: 0 /100 WBCS (ref 0–0)
OXYHGB MFR BLDA: 93.6 % (ref 94–98)
OXYHGB MFR BLDV: 74.5 % (ref 45–75)
OXYHGB MFR BLDV: 79.1 % (ref 45–75)
PCO2 BLDA: 70 MM HG (ref 38–42)
PCO2 BLDV: 69 MM HG (ref 41–51)
PCO2 BLDV: 73 MM HG (ref 41–51)
PH BLDA: 7.24 PH (ref 7.38–7.42)
PH BLDV: 7.2 PH (ref 7.33–7.43)
PH BLDV: 7.26 PH (ref 7.33–7.43)
PHOSPHATE SERPL-MCNC: 1.9 MG/DL (ref 2.5–4.9)
PLATELET # BLD AUTO: 188 X10*3/UL (ref 150–450)
PO2 BLDA: 69 MM HG (ref 85–95)
PO2 BLDV: 42 MM HG (ref 35–45)
PO2 BLDV: 48 MM HG (ref 35–45)
POTASSIUM BLDA-SCNC: 4.2 MMOL/L (ref 3.5–5.3)
POTASSIUM BLDV-SCNC: 3.3 MMOL/L (ref 3.5–5.3)
POTASSIUM BLDV-SCNC: 4.2 MMOL/L (ref 3.5–5.3)
POTASSIUM SERPL-SCNC: 3.6 MMOL/L (ref 3.5–5.3)
POTASSIUM SERPL-SCNC: 4.2 MMOL/L (ref 3.5–5.3)
RBC # BLD AUTO: 3.47 X10*6/UL (ref 4–5.2)
SAO2 % BLDA: 96 % (ref 94–100)
SAO2 % BLDV: 76 % (ref 45–75)
SAO2 % BLDV: 80 % (ref 45–75)
SODIUM BLDA-SCNC: 123 MMOL/L (ref 136–145)
SODIUM BLDV-SCNC: 124 MMOL/L (ref 136–145)
SODIUM BLDV-SCNC: 130 MMOL/L (ref 136–145)
SODIUM SERPL-SCNC: 127 MMOL/L (ref 136–145)
SODIUM SERPL-SCNC: 133 MMOL/L (ref 136–145)
SPECIMEN DRAWN FROM PATIENT: ABNORMAL
TSH SERPL-ACNC: 2.6 MIU/L (ref 0.44–3.98)
WBC # BLD AUTO: 10.6 X10*3/UL (ref 4.4–11.3)

## 2025-01-10 PROCEDURE — 6350000001 HC RX 635 EPOETIN >10,000 UNITS

## 2025-01-10 PROCEDURE — 85027 COMPLETE CBC AUTOMATED: CPT | Performed by: NURSE PRACTITIONER

## 2025-01-10 PROCEDURE — 82947 ASSAY GLUCOSE BLOOD QUANT: CPT

## 2025-01-10 PROCEDURE — 2500000005 HC RX 250 GENERAL PHARMACY W/O HCPCS

## 2025-01-10 PROCEDURE — 99291 CRITICAL CARE FIRST HOUR: CPT

## 2025-01-10 PROCEDURE — 94660 CPAP INITIATION&MGMT: CPT

## 2025-01-10 PROCEDURE — 99291 CRITICAL CARE FIRST HOUR: CPT | Performed by: HOSPITALIST

## 2025-01-10 PROCEDURE — 2500000001 HC RX 250 WO HCPCS SELF ADMINISTERED DRUGS (ALT 637 FOR MEDICARE OP): Performed by: NURSE PRACTITIONER

## 2025-01-10 PROCEDURE — 36415 COLL VENOUS BLD VENIPUNCTURE: CPT | Performed by: NURSE PRACTITIONER

## 2025-01-10 PROCEDURE — 36556 INSERT NON-TUNNEL CV CATH: CPT

## 2025-01-10 PROCEDURE — 2500000004 HC RX 250 GENERAL PHARMACY W/ HCPCS (ALT 636 FOR OP/ED): Performed by: NURSE PRACTITIONER

## 2025-01-10 PROCEDURE — 84132 ASSAY OF SERUM POTASSIUM: CPT | Performed by: HOSPITALIST

## 2025-01-10 PROCEDURE — 2020000001 HC ICU ROOM DAILY

## 2025-01-10 PROCEDURE — 84132 ASSAY OF SERUM POTASSIUM: CPT

## 2025-01-10 PROCEDURE — 36415 COLL VENOUS BLD VENIPUNCTURE: CPT

## 2025-01-10 PROCEDURE — 82330 ASSAY OF CALCIUM: CPT

## 2025-01-10 PROCEDURE — 94640 AIRWAY INHALATION TREATMENT: CPT

## 2025-01-10 PROCEDURE — 99223 1ST HOSP IP/OBS HIGH 75: CPT | Performed by: NURSE PRACTITIONER

## 2025-01-10 PROCEDURE — 8010000001 HC DIALYSIS - HEMODIALYSIS PER DAY

## 2025-01-10 PROCEDURE — 37799 UNLISTED PX VASCULAR SURGERY: CPT

## 2025-01-10 PROCEDURE — 71045 X-RAY EXAM CHEST 1 VIEW: CPT

## 2025-01-10 PROCEDURE — 2500000005 HC RX 250 GENERAL PHARMACY W/O HCPCS: Performed by: HOSPITALIST

## 2025-01-10 PROCEDURE — 5A1D70Z PERFORMANCE OF URINARY FILTRATION, INTERMITTENT, LESS THAN 6 HOURS PER DAY: ICD-10-PCS | Performed by: INTERNAL MEDICINE

## 2025-01-10 PROCEDURE — 80069 RENAL FUNCTION PANEL: CPT | Performed by: NURSE PRACTITIONER

## 2025-01-10 PROCEDURE — 2500000002 HC RX 250 W HCPCS SELF ADMINISTERED DRUGS (ALT 637 FOR MEDICARE OP, ALT 636 FOR OP/ED): Performed by: NURSE PRACTITIONER

## 2025-01-10 PROCEDURE — 70450 CT HEAD/BRAIN W/O DYE: CPT

## 2025-01-10 PROCEDURE — 83735 ASSAY OF MAGNESIUM: CPT

## 2025-01-10 PROCEDURE — 84443 ASSAY THYROID STIM HORMONE: CPT

## 2025-01-10 PROCEDURE — 5A09357 ASSISTANCE WITH RESPIRATORY VENTILATION, LESS THAN 24 CONSECUTIVE HOURS, CONTINUOUS POSITIVE AIRWAY PRESSURE: ICD-10-PCS | Performed by: NURSE PRACTITIONER

## 2025-01-10 PROCEDURE — 2500000004 HC RX 250 GENERAL PHARMACY W/ HCPCS (ALT 636 FOR OP/ED)

## 2025-01-10 PROCEDURE — 70450 CT HEAD/BRAIN W/O DYE: CPT | Performed by: RADIOLOGY

## 2025-01-10 PROCEDURE — 71045 X-RAY EXAM CHEST 1 VIEW: CPT | Performed by: RADIOLOGY

## 2025-01-10 PROCEDURE — 99232 SBSQ HOSP IP/OBS MODERATE 35: CPT

## 2025-01-10 PROCEDURE — 2500000002 HC RX 250 W HCPCS SELF ADMINISTERED DRUGS (ALT 637 FOR MEDICARE OP, ALT 636 FOR OP/ED)

## 2025-01-10 RX ORDER — ALBUTEROL SULFATE 0.83 MG/ML
2.5 SOLUTION RESPIRATORY (INHALATION) EVERY 6 HOURS PRN
Status: ACTIVE | OUTPATIENT
Start: 2025-01-10

## 2025-01-10 RX ORDER — BISACODYL 5 MG
10 TABLET, DELAYED RELEASE (ENTERIC COATED) ORAL DAILY PRN
Status: ACTIVE | OUTPATIENT
Start: 2025-01-10

## 2025-01-10 RX ORDER — HEPARIN SODIUM 1000 [USP'U]/ML
2000 INJECTION, SOLUTION INTRAVENOUS; SUBCUTANEOUS
Status: ACTIVE | OUTPATIENT
Start: 2025-01-10

## 2025-01-10 RX ORDER — CLOPIDOGREL BISULFATE 75 MG/1
75 TABLET ORAL DAILY
Status: ACTIVE | OUTPATIENT
Start: 2025-01-10

## 2025-01-10 RX ORDER — ASPIRIN 81 MG/1
81 TABLET ORAL DAILY
Status: DISPENSED | OUTPATIENT
Start: 2025-01-10

## 2025-01-10 RX ORDER — POLYETHYLENE GLYCOL 3350 17 G/17G
17 POWDER, FOR SOLUTION ORAL DAILY PRN
Status: ACTIVE | OUTPATIENT
Start: 2025-01-10

## 2025-01-10 RX ORDER — GUAIFENESIN/DEXTROMETHORPHAN 100-10MG/5
5 SYRUP ORAL EVERY 4 HOURS PRN
Status: ACTIVE | OUTPATIENT
Start: 2025-01-10

## 2025-01-10 RX ORDER — GUAIFENESIN 600 MG/1
600 TABLET, EXTENDED RELEASE ORAL EVERY 12 HOURS PRN
Status: ACTIVE | OUTPATIENT
Start: 2025-01-10

## 2025-01-10 RX ORDER — DEXTROSE 50 % IN WATER (D50W) INTRAVENOUS SYRINGE
25
Status: DISPENSED | OUTPATIENT
Start: 2025-01-10

## 2025-01-10 RX ORDER — HEPARIN SODIUM 5000 [USP'U]/ML
3000 INJECTION, SOLUTION INTRAVENOUS; SUBCUTANEOUS ONCE
Status: DISCONTINUED | OUTPATIENT
Start: 2025-01-10 | End: 2025-01-10

## 2025-01-10 RX ORDER — ACETAMINOPHEN 650 MG/1
650 SUPPOSITORY RECTAL EVERY 4 HOURS PRN
Status: ACTIVE | OUTPATIENT
Start: 2025-01-10

## 2025-01-10 RX ORDER — ATORVASTATIN CALCIUM 40 MG/1
40 TABLET, FILM COATED ORAL NIGHTLY
Status: DISPENSED | OUTPATIENT
Start: 2025-01-10

## 2025-01-10 RX ORDER — PANTOPRAZOLE SODIUM 40 MG/1
40 TABLET, DELAYED RELEASE ORAL 2 TIMES DAILY
Status: ACTIVE | OUTPATIENT
Start: 2025-01-10

## 2025-01-10 RX ORDER — AMIODARONE HYDROCHLORIDE 200 MG/1
200 TABLET ORAL
Status: DISPENSED | OUTPATIENT
Start: 2025-01-11

## 2025-01-10 RX ORDER — LEVOTHYROXINE SODIUM ANHYDROUS 200 UG/5ML
125 INJECTION, POWDER, LYOPHILIZED, FOR SOLUTION INTRAVENOUS
Status: DISCONTINUED | OUTPATIENT
Start: 2025-01-11 | End: 2025-01-11

## 2025-01-10 RX ORDER — DEXTROSE 50 % IN WATER (D50W) INTRAVENOUS SYRINGE
Status: COMPLETED
Start: 2025-01-10 | End: 2025-01-10

## 2025-01-10 RX ORDER — LEVOTHYROXINE SODIUM 100 UG/1
200 TABLET ORAL DAILY
Status: DISPENSED | OUTPATIENT
Start: 2025-01-11

## 2025-01-10 RX ORDER — LEVOTHYROXINE SODIUM 50 UG/1
50 TABLET ORAL DAILY
Status: DISPENSED | OUTPATIENT
Start: 2025-01-11

## 2025-01-10 RX ORDER — PROCHLORPERAZINE 25 MG/1
25 SUPPOSITORY RECTAL EVERY 12 HOURS PRN
Status: ACTIVE | OUTPATIENT
Start: 2025-01-10

## 2025-01-10 RX ORDER — IPRATROPIUM BROMIDE AND ALBUTEROL SULFATE 2.5; .5 MG/3ML; MG/3ML
3 SOLUTION RESPIRATORY (INHALATION)
Status: DISCONTINUED | OUTPATIENT
Start: 2025-01-10 | End: 2025-01-12

## 2025-01-10 RX ORDER — VENLAFAXINE HYDROCHLORIDE 150 MG/1
150 CAPSULE, EXTENDED RELEASE ORAL DAILY
Status: DISPENSED | OUTPATIENT
Start: 2025-01-11

## 2025-01-10 RX ORDER — METOPROLOL SUCCINATE 50 MG/1
50 TABLET, EXTENDED RELEASE ORAL 2 TIMES DAILY
Status: DISCONTINUED | OUTPATIENT
Start: 2025-01-10 | End: 2025-01-12

## 2025-01-10 RX ORDER — FERROUS GLUCONATE 325 MG
324 TABLET ORAL
Status: DISCONTINUED | OUTPATIENT
Start: 2025-01-11 | End: 2025-01-10

## 2025-01-10 RX ORDER — AMLODIPINE BESYLATE 5 MG/1
5 TABLET ORAL DAILY
Status: DISPENSED | OUTPATIENT
Start: 2025-01-11

## 2025-01-10 RX ORDER — ACETAMINOPHEN 160 MG/5ML
650 SOLUTION ORAL EVERY 4 HOURS PRN
Status: ACTIVE | OUTPATIENT
Start: 2025-01-10

## 2025-01-10 RX ORDER — PROCHLORPERAZINE EDISYLATE 5 MG/ML
10 INJECTION INTRAMUSCULAR; INTRAVENOUS EVERY 6 HOURS PRN
Status: ACTIVE | OUTPATIENT
Start: 2025-01-10

## 2025-01-10 RX ORDER — HEPARIN SODIUM 5000 [USP'U]/ML
5000 INJECTION, SOLUTION INTRAVENOUS; SUBCUTANEOUS EVERY 8 HOURS SCHEDULED
Status: DISCONTINUED | OUTPATIENT
Start: 2025-01-10 | End: 2025-01-11

## 2025-01-10 RX ORDER — DEXTROSE 50 % IN WATER (D50W) INTRAVENOUS SYRINGE
12.5
Status: DISPENSED | OUTPATIENT
Start: 2025-01-10

## 2025-01-10 RX ORDER — PANTOPRAZOLE SODIUM 40 MG/10ML
40 INJECTION, POWDER, LYOPHILIZED, FOR SOLUTION INTRAVENOUS 2 TIMES DAILY
Status: DISPENSED | OUTPATIENT
Start: 2025-01-10

## 2025-01-10 RX ORDER — PROCHLORPERAZINE MALEATE 10 MG
10 TABLET ORAL EVERY 6 HOURS PRN
Status: ACTIVE | OUTPATIENT
Start: 2025-01-10

## 2025-01-10 RX ORDER — ACETAMINOPHEN 325 MG/1
650 TABLET ORAL EVERY 4 HOURS PRN
Status: ACTIVE | OUTPATIENT
Start: 2025-01-10

## 2025-01-10 RX ADMIN — EPOETIN ALFA-EPBX 5000 UNITS: 20000 INJECTION, SOLUTION INTRAVENOUS; SUBCUTANEOUS at 18:16

## 2025-01-10 RX ADMIN — VENLAFAXINE HYDROCHLORIDE 150 MG: 150 CAPSULE, EXTENDED RELEASE ORAL at 09:12

## 2025-01-10 RX ADMIN — METOPROLOL SUCCINATE 50 MG: 50 TABLET, EXTENDED RELEASE ORAL at 09:12

## 2025-01-10 RX ADMIN — AMIODARONE HYDROCHLORIDE 200 MG: 200 TABLET ORAL at 09:12

## 2025-01-10 RX ADMIN — HEPARIN SODIUM 1200 UNITS: 1000 INJECTION, SOLUTION INTRAVENOUS; SUBCUTANEOUS at 19:02

## 2025-01-10 RX ADMIN — Medication 30 PERCENT: at 19:50

## 2025-01-10 RX ADMIN — Medication 324 MG: at 09:11

## 2025-01-10 RX ADMIN — AMLODIPINE BESYLATE 5 MG: 5 TABLET ORAL at 09:12

## 2025-01-10 RX ADMIN — ATORVASTATIN CALCIUM 40 MG: 40 TABLET, FILM COATED ORAL at 09:12

## 2025-01-10 RX ADMIN — LEVOTHYROXINE SODIUM 200 MCG: 0.1 TABLET ORAL at 06:03

## 2025-01-10 RX ADMIN — HEPARIN SODIUM 5000 UNITS: 5000 INJECTION, SOLUTION INTRAVENOUS; SUBCUTANEOUS at 22:02

## 2025-01-10 RX ADMIN — DEXTROSE MONOHYDRATE 12.5 G: 25 INJECTION, SOLUTION INTRAVENOUS at 13:27

## 2025-01-10 RX ADMIN — Medication 30 PERCENT: at 15:27

## 2025-01-10 RX ADMIN — PANTOPRAZOLE SODIUM 40 MG: 40 TABLET, DELAYED RELEASE ORAL at 06:02

## 2025-01-10 RX ADMIN — HEPARIN SODIUM 1200 UNITS: 1000 INJECTION, SOLUTION INTRAVENOUS; SUBCUTANEOUS at 19:01

## 2025-01-10 RX ADMIN — PANTOPRAZOLE SODIUM 40 MG: 40 INJECTION, POWDER, FOR SOLUTION INTRAVENOUS at 22:00

## 2025-01-10 RX ADMIN — IPRATROPIUM BROMIDE AND ALBUTEROL SULFATE 3 ML: 2.5; .5 SOLUTION RESPIRATORY (INHALATION) at 19:50

## 2025-01-10 RX ADMIN — DEXTROSE MONOHYDRATE 50 ML: 25 INJECTION, SOLUTION INTRAVENOUS at 08:52

## 2025-01-10 RX ADMIN — HEPARIN SODIUM 5000 UNITS: 5000 INJECTION, SOLUTION INTRAVENOUS; SUBCUTANEOUS at 09:12

## 2025-01-10 RX ADMIN — DEXTROSE MONOHYDRATE 12.5 G: 25 INJECTION, SOLUTION INTRAVENOUS at 20:00

## 2025-01-10 RX ADMIN — LEVOTHYROXINE SODIUM 50 MCG: 50 TABLET ORAL at 06:02

## 2025-01-10 SDOH — SOCIAL STABILITY: SOCIAL INSECURITY: WITHIN THE LAST YEAR, HAVE YOU BEEN AFRAID OF YOUR PARTNER OR EX-PARTNER?: NO

## 2025-01-10 SDOH — SOCIAL STABILITY: SOCIAL INSECURITY: DO YOU FEEL UNSAFE GOING BACK TO THE PLACE WHERE YOU ARE LIVING?: UNABLE TO ASSESS

## 2025-01-10 SDOH — SOCIAL STABILITY: SOCIAL INSECURITY: WERE YOU ABLE TO COMPLETE ALL THE BEHAVIORAL HEALTH SCREENINGS?: YES

## 2025-01-10 SDOH — ECONOMIC STABILITY: INCOME INSECURITY: IN THE PAST 12 MONTHS HAS THE ELECTRIC, GAS, OIL, OR WATER COMPANY THREATENED TO SHUT OFF SERVICES IN YOUR HOME?: NO

## 2025-01-10 SDOH — HEALTH STABILITY: MENTAL HEALTH: HOW OFTEN DO YOU HAVE A DRINK CONTAINING ALCOHOL?: NEVER

## 2025-01-10 SDOH — SOCIAL STABILITY: SOCIAL INSECURITY: HAVE YOU HAD THOUGHTS OF HARMING ANYONE ELSE?: NO

## 2025-01-10 SDOH — ECONOMIC STABILITY: HOUSING INSECURITY: AT ANY TIME IN THE PAST 12 MONTHS, WERE YOU HOMELESS OR LIVING IN A SHELTER (INCLUDING NOW)?: NO

## 2025-01-10 SDOH — ECONOMIC STABILITY: FOOD INSECURITY: WITHIN THE PAST 12 MONTHS, YOU WORRIED THAT YOUR FOOD WOULD RUN OUT BEFORE YOU GOT THE MONEY TO BUY MORE.: NEVER TRUE

## 2025-01-10 SDOH — ECONOMIC STABILITY: HOUSING INSECURITY
IN THE LAST 12 MONTHS, WAS THERE A TIME WHEN YOU WERE NOT ABLE TO PAY THE MORTGAGE OR RENT ON TIME?: PATIENT UNABLE TO ANSWER

## 2025-01-10 SDOH — HEALTH STABILITY: MENTAL HEALTH: HOW MANY DRINKS CONTAINING ALCOHOL DO YOU HAVE ON A TYPICAL DAY WHEN YOU ARE DRINKING?: PATIENT DOES NOT DRINK

## 2025-01-10 SDOH — SOCIAL STABILITY: SOCIAL INSECURITY: ARE YOU OR HAVE YOU BEEN THREATENED OR ABUSED PHYSICALLY, EMOTIONALLY, OR SEXUALLY BY ANYONE?: UNABLE TO ASSESS

## 2025-01-10 SDOH — ECONOMIC STABILITY: HOUSING INSECURITY: IN THE PAST 12 MONTHS, HOW MANY TIMES HAVE YOU MOVED WHERE YOU WERE LIVING?: 1

## 2025-01-10 SDOH — SOCIAL STABILITY: SOCIAL INSECURITY: DOES ANYONE TRY TO KEEP YOU FROM HAVING/CONTACTING OTHER FRIENDS OR DOING THINGS OUTSIDE YOUR HOME?: UNABLE TO ASSESS

## 2025-01-10 SDOH — ECONOMIC STABILITY: FOOD INSECURITY: WITHIN THE PAST 12 MONTHS, THE FOOD YOU BOUGHT JUST DIDN'T LAST AND YOU DIDN'T HAVE MONEY TO GET MORE.: NEVER TRUE

## 2025-01-10 SDOH — SOCIAL STABILITY: SOCIAL INSECURITY: HAVE YOU HAD ANY THOUGHTS OF HARMING ANYONE ELSE?: UNABLE TO ASSESS

## 2025-01-10 SDOH — ECONOMIC STABILITY: TRANSPORTATION INSECURITY: IN THE PAST 12 MONTHS, HAS LACK OF TRANSPORTATION KEPT YOU FROM MEDICAL APPOINTMENTS OR FROM GETTING MEDICATIONS?: NO

## 2025-01-10 SDOH — SOCIAL STABILITY: SOCIAL INSECURITY: WITHIN THE LAST YEAR, HAVE YOU BEEN HUMILIATED OR EMOTIONALLY ABUSED IN OTHER WAYS BY YOUR PARTNER OR EX-PARTNER?: NO

## 2025-01-10 SDOH — SOCIAL STABILITY: SOCIAL INSECURITY: DO YOU FEEL ANYONE HAS EXPLOITED OR TAKEN ADVANTAGE OF YOU FINANCIALLY OR OF YOUR PERSONAL PROPERTY?: UNABLE TO ASSESS

## 2025-01-10 SDOH — SOCIAL STABILITY: SOCIAL INSECURITY: ABUSE: ADULT

## 2025-01-10 SDOH — SOCIAL STABILITY: SOCIAL INSECURITY: HAS ANYONE EVER THREATENED TO HURT YOUR FAMILY OR YOUR PETS?: UNABLE TO ASSESS

## 2025-01-10 SDOH — SOCIAL STABILITY: SOCIAL INSECURITY: ARE THERE ANY APPARENT SIGNS OF INJURIES/BEHAVIORS THAT COULD BE RELATED TO ABUSE/NEGLECT?: UNABLE TO ASSESS

## 2025-01-10 SDOH — ECONOMIC STABILITY: FOOD INSECURITY
HOW HARD IS IT FOR YOU TO PAY FOR THE VERY BASICS LIKE FOOD, HOUSING, MEDICAL CARE, AND HEATING?: PATIENT UNABLE TO ANSWER

## 2025-01-10 ASSESSMENT — LIFESTYLE VARIABLES
AUDIT-C TOTAL SCORE: 0
HOW OFTEN DO YOU HAVE 6 OR MORE DRINKS ON ONE OCCASION: NEVER
AUDIT-C TOTAL SCORE: 0
SKIP TO QUESTIONS 9-10: 1

## 2025-01-10 ASSESSMENT — COGNITIVE AND FUNCTIONAL STATUS - GENERAL
TOILETING: A LOT
MOBILITY SCORE: 15
DAILY ACTIVITIY SCORE: 18
HELP NEEDED FOR BATHING: A LITTLE
WALKING IN HOSPITAL ROOM: A LITTLE
MOVING FROM LYING ON BACK TO SITTING ON SIDE OF FLAT BED WITH BEDRAILS: A LITTLE
HELP NEEDED FOR BATHING: A LOT
DAILY ACTIVITIY SCORE: 12
DRESSING REGULAR UPPER BODY CLOTHING: A LOT
STANDING UP FROM CHAIR USING ARMS: A LOT
PATIENT BASELINE BEDBOUND: NO
DRESSING REGULAR LOWER BODY CLOTHING: A LITTLE
STANDING UP FROM CHAIR USING ARMS: TOTAL
MOVING TO AND FROM BED TO CHAIR: A LITTLE
PERSONAL GROOMING: A LITTLE
DRESSING REGULAR LOWER BODY CLOTHING: A LOT
TURNING FROM BACK TO SIDE WHILE IN FLAT BAD: A LOT
WALKING IN HOSPITAL ROOM: TOTAL
EATING MEALS: A LITTLE
EATING MEALS: A LOT
CLIMB 3 TO 5 STEPS WITH RAILING: TOTAL
DRESSING REGULAR UPPER BODY CLOTHING: A LITTLE
TOILETING: A LITTLE
PERSONAL GROOMING: A LOT
CLIMB 3 TO 5 STEPS WITH RAILING: A LOT
TURNING FROM BACK TO SIDE WHILE IN FLAT BAD: A LITTLE
MOVING TO AND FROM BED TO CHAIR: TOTAL
MOVING FROM LYING ON BACK TO SITTING ON SIDE OF FLAT BED WITH BEDRAILS: A LITTLE
MOBILITY SCORE: 10

## 2025-01-10 ASSESSMENT — PAIN - FUNCTIONAL ASSESSMENT
PAIN_FUNCTIONAL_ASSESSMENT: 0-10
PAIN_FUNCTIONAL_ASSESSMENT: NO/DENIES PAIN

## 2025-01-10 ASSESSMENT — ENCOUNTER SYMPTOMS
MYALGIAS: 1
BACK PAIN: 1
HEADACHES: 1
SHORTNESS OF BREATH: 1
ARTHRALGIAS: 1
COUGH: 1
CONFUSION: 1
CONSTITUTIONAL NEGATIVE: 1
EYES NEGATIVE: 1
GASTROINTESTINAL NEGATIVE: 1
HEMATOLOGIC/LYMPHATIC NEGATIVE: 1
ENDOCRINE NEGATIVE: 1
ALLERGIC/IMMUNOLOGIC NEGATIVE: 1
CARDIOVASCULAR NEGATIVE: 1

## 2025-01-10 ASSESSMENT — PAIN SCALES - GENERAL
PAINLEVEL_OUTOF10: 0 - NO PAIN

## 2025-01-10 ASSESSMENT — ACTIVITIES OF DAILY LIVING (ADL)
BATHING: NEEDS ASSISTANCE
DRESSING YOURSELF: NEEDS ASSISTANCE
ASSISTIVE_DEVICE: OXYGEN
TOILETING: DEPENDENT
LACK_OF_TRANSPORTATION: NO
GROOMING: NEEDS ASSISTANCE
WALKS IN HOME: DEPENDENT
LACK_OF_TRANSPORTATION: PATIENT UNABLE TO ANSWER
ADEQUATE_TO_COMPLETE_ADL: NO
HEARING - RIGHT EAR: FUNCTIONAL
PATIENT'S MEMORY ADEQUATE TO SAFELY COMPLETE DAILY ACTIVITIES?: NO
FEEDING YOURSELF: NEEDS ASSISTANCE
HEARING - LEFT EAR: FUNCTIONAL
JUDGMENT_ADEQUATE_SAFELY_COMPLETE_DAILY_ACTIVITIES: NO

## 2025-01-10 ASSESSMENT — PATIENT HEALTH QUESTIONNAIRE - PHQ9
2. FEELING DOWN, DEPRESSED OR HOPELESS: NOT AT ALL
SUM OF ALL RESPONSES TO PHQ9 QUESTIONS 1 & 2: 0
1. LITTLE INTEREST OR PLEASURE IN DOING THINGS: NOT AT ALL

## 2025-01-10 NOTE — PROGRESS NOTES
Occupational Therapy                 Therapy Communication Note    Patient Name: Daphne Morris  MRN: [id number]  Department: [facility] S ICU  Room: 10/10-A  Today's Date: 1/10/2025     Discipline: Occupational Therapy    OT Missed Visit: Yes     Missed Visit Reason: Missed Visit Reason: Cancel (RN deferring therapy; patient just transferred to ICU and is not medically stable for therapy at this time.)    Missed Time: Cancel    Comment:

## 2025-01-10 NOTE — H&P
History Of Present Illness  Daphne Morris is a 69 y.o. female presented because her AV fistula is not working.  Patient's was at  skilled of nursing since 1 /7.  patient on dialysis on Tuesday Thursday and Saturday.  Last dialysis was on 1/6/2025.  Patient complaining of discomfort on her left arm and back pain.  Patient denies any chest pain or shortness of breath on 3 L nasal cannula which is her basic line.  No nausea vomiting or abdominal pain.  Tolerates well her diet last BM was this morning. No fever or chills.    Patient with a PMH of H TN, CAD, PVD, DM, atrial fibrillation, COPD (3 LPM oxygen at baseline), hypothyroidism, CVA with R sided weaknes      Past Medical History  Past Medical History:   Diagnosis Date    Anal fissure 10/12/2023    Anemia     ASHD (arteriosclerotic heart disease)     At risk for falls     Atrial fibrillation (Multi)     CHF (congestive heart failure)     CKD (chronic kidney disease)     COPD (chronic obstructive pulmonary disease) (Multi)     CVA (cerebral vascular accident) (Multi)     2021- right-sided weakness    Depression     Diabetes mellitus (Multi)     GERD (gastroesophageal reflux disease)     H/O pleural effusion     Hyperlipidemia     Hypertension     Hypothyroidism     Hypoxia     Impacted cerumen, left ear     Impacted cerumen of left ear    Noncompliance     Osteoarthritis     Perianal dermatitis 10/12/2023    Personal history of other diseases of the respiratory system     History of acute bronchitis    PVD (peripheral vascular disease) (CMS-HCC)     Renal carcinoma, right (Multi)     s/p partial nephrectomy 8/2021    Respiratory failure (Multi)     TIA (transient ischemic attack)     Vasculitis (CMS-HCC) 10/12/2023    Weakness        Surgical History  Past Surgical History:   Procedure Laterality Date    ANKLE SURGERY      2013    CARDIAC CATHETERIZATION N/A 2/7/2024    Procedure: Right Heart Cath;  Surgeon: Nicholas Antonio MD;  Location: [facility]  Lab;  Service: Cardiovascular;  Laterality: N/A;    CATARACT EXTRACTION Right     2019     SECTION, CLASSIC      MR CHEST ANGIO W AND WO IV CONTRAST  2023    MR CHEST ANGIO W AND WO IV CONTRAST 2023 [facility] MRI    MR HEAD ANGIO WO IV CONTRAST  2021    MR HEAD ANGIO WO IV CONTRAST [facility]    NEPHRECTOMY  2021    SHOULDER SURGERY              Social History  She reports that she has been smoking cigarettes. She started smoking about 56 years ago. She has a 28 pack-year smoking history. She has never been exposed to tobacco smoke. She has never used smokeless tobacco. She reports that she does not currently use alcohol. She reports that she does not currently use drugs after having used the following drugs: Marijuana.    Family History  Family History   Problem Relation Name Age of Onset    Hypertension Mother      Diabetes Father      Heart disease Father      Cancer Sister      Cancer Brother      Diabetes Daughter      Asthma Daughter      Heart attack Daughter      Diabetes Maternal Grandfather      Heart disease Paternal Grandmother      Diabetes Other      Hypertension Other      COPD Other      Other (chronic lung disease) Other          Allergies  Bee venom protein (honey bee); Citalopram; Codeine; Influenza virus vaccines; Latex; Latex, natural rubber; Lisinopril; Penicillins; Adhesive tape-silicones; Amoxicillin; Doxycycline; Oseltamivir; Phenyleph-min oil-petrolatum; Phenyleph-shark oil-glyc-pet; Phenylephrine; Prednisone; Tuberculin ppd; and Xylometazoline    Review of Systems   Constitutional:  Negative for activity change, appetite change, diaphoresis, fatigue, fever and unexpected weight change.   HENT:  Negative for congestion, rhinorrhea, sneezing, sore throat, trouble swallowing and voice change.    Respiratory:  Negative for apnea, cough, chest tightness, shortness of breath and wheezing.    Cardiovascular:  Negative for palpitations and leg  "swelling.   Gastrointestinal:  Negative for abdominal distention, abdominal pain, blood in stool, diarrhea, nausea and vomiting.   Endocrine: Negative.    Genitourinary:  Positive for decreased urine volume. Negative for difficulty urinating.   Musculoskeletal:  Positive for back pain, gait problem, myalgias and neck pain. Negative for neck stiffness.   Skin: Negative.    Allergic/Immunologic: Negative.    Neurological:  Negative for dizziness, tremors, seizures, syncope, facial asymmetry, speech difficulty, weakness, light-headedness, numbness and headaches.   Hematological: Negative.    Psychiatric/Behavioral: Negative.          Physical Exam  Vitals and nursing note reviewed.   Constitutional:       Appearance: Normal appearance.   HENT:      Head: Normocephalic and atraumatic.      Nose: No congestion or rhinorrhea.   Eyes:      Extraocular Movements: Extraocular movements intact.      Pupils: Pupils are equal, round, and reactive to light.   Cardiovascular:      Rate and Rhythm: Normal rate.      Pulses: Normal pulses.   Pulmonary:      Effort: Pulmonary effort is normal.      Breath sounds: Normal breath sounds.   Abdominal:      General: Bowel sounds are normal. There is no distension.      Palpations: Abdomen is soft.   Musculoskeletal:         General: No swelling or tenderness.      Cervical back: Normal range of motion and neck supple.   Skin:     General: Skin is warm.   Neurological:      General: No focal deficit present.      Mental Status: She is alert and oriented to person, place, and time.   Psychiatric:         Mood and Affect: Mood normal.          Last Recorded Vitals  Blood pressure 130/55, pulse 62, temperature 36.6 °C (97.9 °F), temperature source Temporal, resp. rate 18, height 1.676 m (5' 6\"), weight 70.3 kg (155 lb), SpO2 95%.    Relevant Results  No current facility-administered medications on file prior to encounter.     Current Outpatient Medications on File Prior to Encounter " "  Medication Sig Dispense Refill    acetaminophen (Tylenol) 500 mg tablet Take 2 tablets (1,000 mg) by mouth 2 times a day.      albuterol 90 mcg/actuation inhaler INHALE 2 PUFFS BY MOUTH EVERY 4 HOURS AS NEEDED 8.5 g 0    amiodarone (Pacerone) 200 mg tablet TAKE 1 TABLET BY MOUTH ONCE DAILY WITH BREAKFAST. 90 tablet 0    amLODIPine (Norvasc) 5 mg tablet Take 1 tablet (5 mg) by mouth once daily.      aspirin 81 mg EC tablet Take 1 tablet (81 mg) by mouth once daily.      atorvastatin (Lipitor) 40 mg tablet TAKE ONE TABLET BY MOUTH EVERY DAY 90 tablet 3    BD Calista 2nd Gen Pen Needle 32 gauge x 5/32\" needle USE SUBCUTANEOUSLY AS DIRECTED TWICE DAILY 200 each 2    clopidogrel (Plavix) 75 mg tablet Take 1 tablet (75 mg) by mouth once daily.      ferrous gluconate 324 (38 Fe) mg tablet Take 1 tablet (324 mg) by mouth once daily with breakfast. 30 tablet 3    FreeStyle Shakir 14 Day Sensor kit USE AS DIRECTED TO CHECK SUGARS 3 TO 4 TIMES DAILY 1 each 11    FreeStyle Shakir 2 Belt misc Use as instructed 1 each 0    FreeStyle Shakir 3 Belt misc Use as instructed 1 each 0    FreeStyle Shakir 3 Sensor device Change sensor Q 14 days 2 each 11    HumaLOG KwikPen Insulin 100 unit/mL injection up to 15 units Subcutaneous three times a day per sliding scaleup to 15 units Subcutaneous three times a day per sliding scale (Patient taking differently: up to 15 units Subcutaneous three times a day per sliding scale.      151-200 2 units  201-250 4 units  251-300 6 units  301-350 8 units  351-400 10 units) 5 each 3    ipratropium-albuteroL (Duo-Neb) 0.5-2.5 mg/3 mL nebulizer solution INHALE THE CONTENTS OF 1 VIAL VIA NEBULIZER EVERY 6 HOURS AS NEEDED. 360 mL 0    levothyroxine (Synthroid, Levoxyl) 200 mcg tablet Take 1 tablet by mouth once daily in the morning before a meal. 90 tablet 3    levothyroxine (Synthroid, Levoxyl) 50 mcg tablet Take 1 tablet (50 mcg) by mouth once daily in the morning. Take before meals. 90 tablet 3    " metoprolol succinate XL (Toprol-XL) 50 mg 24 hr tablet TAKE ONE TABLET BY MOUTH TWO TIMES A  tablet 0    oxygen DME/Hospice (O2) therapy Inhale 3 L/min once daily at bedtime. Indications: decreased oxygen in the tissues or blood      pantoprazole (ProtoNix) 40 mg EC tablet TAKE ONE TABLET BY MOUTH TWO TIMES A DAY 60 tablet 0    venlafaxine XR (Effexor-XR) 75 mg 24 hr capsule Take 2 capsules (150 mg) by mouth once daily. 180 capsule 3    [DISCONTINUED] Lantus Solostar U-100 Insulin 100 unit/mL (3 mL) pen Inject 10 Units under the skin once daily at bedtime. 9 mL 3     Results for orders placed or performed during the hospital encounter of 01/09/25 (from the past 24 hours)   CBC and Auto Differential   Result Value Ref Range    WBC 11.9 (H) 4.4 - 11.3 x10*3/uL    nRBC 0.0 0.0 - 0.0 /100 WBCs    RBC 3.40 (L) 4.00 - 5.20 x10*6/uL    Hemoglobin 10.3 (L) 12.0 - 16.0 g/dL    Hematocrit 32.5 (L) 36.0 - 46.0 %    MCV 96 80 - 100 fL    MCH 30.3 26.0 - 34.0 pg    MCHC 31.7 (L) 32.0 - 36.0 g/dL    RDW 13.4 11.5 - 14.5 %    Platelets 179 150 - 450 x10*3/uL    Neutrophils % 87.5 40.0 - 80.0 %    Immature Granulocytes %, Automated 0.7 0.0 - 0.9 %    Lymphocytes % 3.6 13.0 - 44.0 %    Monocytes % 6.5 2.0 - 10.0 %    Eosinophils % 1.5 0.0 - 6.0 %    Basophils % 0.2 0.0 - 2.0 %    Neutrophils Absolute 10.38 (H) 1.20 - 7.70 x10*3/uL    Immature Granulocytes Absolute, Automated 0.08 0.00 - 0.70 x10*3/uL    Lymphocytes Absolute 0.43 (L) 1.20 - 4.80 x10*3/uL    Monocytes Absolute 0.77 0.10 - 1.00 x10*3/uL    Eosinophils Absolute 0.18 0.00 - 0.70 x10*3/uL    Basophils Absolute 0.02 0.00 - 0.10 x10*3/uL   Comprehensive metabolic panel   Result Value Ref Range    Glucose 92 74 - 99 mg/dL    Sodium 126 (L) 136 - 145 mmol/L    Potassium 4.3 3.5 - 5.3 mmol/L    Chloride 91 (L) 98 - 107 mmol/L    Bicarbonate 27 21 - 32 mmol/L    Anion Gap 12 10 - 20 mmol/L    Urea Nitrogen 41 (H) 6 - 23 mg/dL    Creatinine 3.57 (H) 0.50 - 1.05 mg/dL     eGFR 13 (L) >60 mL/min/1.73m*2    Calcium 7.6 (L) 8.6 - 10.3 mg/dL    Albumin 2.9 (L) 3.4 - 5.0 g/dL    Alkaline Phosphatase 87 33 - 136 U/L    Total Protein 5.6 (L) 6.4 - 8.2 g/dL    AST 10 9 - 39 U/L    Bilirubin, Total 0.6 0.0 - 1.2 mg/dL    ALT 7 7 - 45 U/L     *Note: Due to a large number of results and/or encounters for the requested time period, some results have not been displayed. A complete set of results can be found in Results Review.   XR chest 1 view    Result Date: 1/9/2025  Interpreted By:  Sujit Rebolledo, STUDY: XR CHEST 1 VIEW; ;  1/9/2025 9:33 am   INDICATION: Signs/Symptoms:dyspnea, on HD.   COMPARISON: 01/01/2025   ACCESSION NUMBER(S): [id number]   ORDERING CLINICIAN: CHANDRIKA CAMARILLO   TECHNIQUE: A single AP radiograph of the chest is performed.   FINDINGS: The heart is enlarged. There is mild vascular and interstitial congestion. There is a moderate size right pleural effusion with underlying atelectasis or consolidation. There is increasing consolidation and/or atelectasis in the left lung base with small left pleural effusion. There is no pneumothorax.       Persistent cardiomegaly with vascular and interstitial congestion.   Bilateral pleural effusions and parenchymal opacity, greater on the right. These findings have increased in the interval. Continued clinical and radiographic follow-up is recommended.     MACRO: None   Signed by: Sujit Rebolledo 1/9/2025 9:46 AM Dictation workstation:   [id number]    ECG 12 lead    Result Date: 1/8/2025  Normal sinus rhythm Left axis deviation Left bundle branch block Abnormal ECG When compared with ECG of 26-DEC-2024 08:17, Left bundle branch block is now Present Criteria for Anterior infarct are no longer Present Criteria for Anterolateral infarct are no longer Present Criteria for Inferior infarct are no longer Present See ED provider note for full interpretation and clinical correlation Confirmed by Wanda Banegas ([id number]) on 1/8/2025  7:27:31 PM    ECG 12 lead    Result Date: 1/8/2025  Sinus rhythm with 1st degree AV block Left axis deviation Left bundle branch block Abnormal ECG When compared with ECG of 29-DEC-2024 17:49, (unconfirmed) Sinus rhythm has replaced Wide QRS rhythm See ED provider note for full interpretation and clinical correlation Confirmed by Wanda Banegas ([id number]) on 1/8/2025 7:24:03 PM    ECG 12 lead    Result Date: 1/7/2025  Wide QRS rhythm Left axis deviation Left bundle branch block Abnormal ECG When compared with ECG of 26-DEC-2024 08:17, Wide QRS rhythm has replaced Sinus rhythm See ED provider note for full interpretation and clinical correlation Confirmed by Rose Viera ([id number]) on 1/7/2025 3:02:24 PM    Electrocardiogram, 12-lead PRN ACS symptoms    Result Date: 1/6/2025  Sinus rhythm Left axis deviation Left bundle branch block Abnormal ECG Confirmed by Sujit Hernandez ([id number]) on 1/6/2025 9:40:53 AM    US thoracentesis    Result Date: 1/3/2025  Interpreted By:  Danielle Hutson, STUDY: US THORACENTESIS; 1/3/098797:51 pm   INDICATION: Signs/Symptoms:Right pleural effusion.   COMPARISON: None.   ACCESSION NUMBER(S): [id number]   ORDERING CLINICIAN: ANGEL RAMOS   TECHNIQUE: INTERVENTIONALIST: Danielle Hutson   CONSENT: The patient/patient's POA/next of kin was informed of the nature of the proposed procedure. Risks were discussed including but not limited to bleeding, infection, pain at insertion site, or pneumo/hemothorax requiring chest tube placement. Purpose of treatment and alternative options were also reviewed. All questions were answered and patient agreed to proceed.   SEDATION: None   MEDICATION/CONTRAST: Lidocaine 1%.   TIME OUT: A time out was performed immediately prior to procedure start with the interventional team, correctly identifying the patient name, date of birth, MRN, procedure, anatomy (including marking of site and side), patient position, procedure consent form, relevant  laboratory and imaging test results, antibiotic administration, safety precautions, and procedure-specific equipment needs.   FINDINGS: The patient was placed in the left lateral decubitus position.   The patient's right pleural space was scanned using the ultrasound probe. The area of fluid most amenable to drainage was marked. Color doppler was used to assess for vasculature in the intended field.   The patient's skin was sterilized using chlorhexidine and draped in sterile manner. The skin was anesthestized with 1% lidocaine.  Under real time ultrasound guidance, a 5F valved centesis catheter was inserted into the right pleural space where previously marked. A total of 1,000 mL of clear yellow pleural fluid was removed. The catheter was then removed and a sterile op site was placed over the insertion site. Sterile technique used throughout entirety of procedure.   Specimens were obtained, labeled and sent to lab with requisitions ordered by primary team.   The patient tolerated the procedure well and there were no immediate complications.       Uneventful thoracentesis, as detailed above: Right Pleural space, 1,000 mL   Performed and dictated at [facility].   Signed by: Danielle Hutson 1/3/2025 12:51 PM Dictation workstation:   [id number]    XR chest 1 view    Result Date: 1/1/2025  Interpreted By:  Beulah Chacon, STUDY: XR CHEST 1 VIEW;  1/1/2025 3:36 am   INDICATION: Signs/Symptoms:respiratory failure, Influenza     COMPARISON: Chest x-ray 12/29/2024   ACCESSION NUMBER(S): [id number]   ORDERING CLINICIAN: ANTHONY CASH   TECHNIQUE: Portable semi-upright frontal view of the chest was obtained .   FINDINGS: Monitoring wires are overlying the patient.   The heart is enlarged. There is a left atrial appendage closure device. There is mild vascular congestion. Atherosclerotic calcifications are present at the aortic arch.   There is a moderate size right pleural effusion with  atelectasis/consolidation at the right lung base. No pneumothorax.       1. Cardiomegaly. Mild vascular congestion. Moderate right pleural effusion with atelectasis/consolidation at the right lung base.       MACRO: None.   Signed by: Beulah Chacon 1/1/2025 3:48 AM Dictation workstation:   [id number]    XR forearm right 2 views    Result Date: 12/31/2024  STUDY: Forearm Radiographs; 12/31/2024 11:11AM INDICATION: Fall, skin tear to the right arm. COMPARISON: 4/29/2024 XR Forearm right. ACCESSION NUMBER(S): [id number] ORDERING CLINICIAN: MEAGAN ELY TECHNIQUE:  Two view(s) of the right forearm. FINDINGS:  There is no displaced fracture.  The alignment is anatomic.  Soft tissue swelling about the mid forearm noted..    No acute osseous findings. Signed by Ashish Otero II, MD    CT cervical spine wo IV contrast    Result Date: 12/31/2024  Interpreted By:  Vinh Bautista, STUDY: CT CERVICAL SPINE WO IV CONTRAST; 12/31/2024 11:05 am   INDICATION: Signs/Symptoms:fall.   COMPARISON: CT dated 05/14/2024   ACCESSION NUMBER(S): [id number]   ORDERING CLINICIAN: MEAGAN ELY   TECHNIQUE: Contiguous axial CT images were obtained at  2 mm slice thickness through the cervical spine without contrast administration. The images were then reconstructed in the coronal and sagittal planes.   FINDINGS: There is no CT evidence of acute fracture identified. No prevertebral soft tissue swelling is identified.   ALIGNMENT: The vertebral bodies are well aligned without evidence of subluxation.   VERTEBRAE/DISC SPACES: Moderate disc space narrowing and marginal osteophyte formation is seen at the C6-7 level. Moderate to severe facet degenerative changes are seen throughout the cervical spine.   ADDITIONAL FINDINGS: Evaluation of the visualized soft tissues of the neck is limited by the lack of intravenous contrast.  Within this limitation, no gross mass or lymphadenopathy is identified.       1.  No CT evidence of acute  fracture. 2.  Degenerative changes throughout the cervical spine, as above.   Signed by: Vinh Bautista 12/31/2024 11:47 AM Dictation workstation:   [id number]    CT head wo IV contrast    Result Date: 12/31/2024  Interpreted By:  Vinh Bautista, STUDY: CT HEAD WO IV CONTRAST; 12/31/2024 11:05 am   INDICATION: Signs/Symptoms:fall.   COMPARISON: CT head dated 05/14/2024   ACCESSION NUMBER(S): [id number]   ORDERING CLINICIAN: MEAGAN ELY   TECHNIQUE: Contiguous axial CT images were obtained through the head at 5 mm slice thickness without contrast administration.   FINDINGS: INTRACRANIAL: The ventricles, sulci and basal cisterns are within normal limits for size and configuration. The grey-white differentiation is intact. There is no mass effect or midline shift. There is no extraaxial fluid collection. There is no intracranial hemorrhage.  The calvarium is unremarkable.   EXTRACRANIAL: Visualized paranasal sinuses and mastoids are clear.       No evidence of acute cortical infarct or intracranial hemorrhage.   MACRO: None     Signed by: Vinh Bautista 12/31/2024 11:41 AM Dictation workstation:   [id number]    XR chest 1 view    Result Date: 12/29/2024  Interpreted By:  Diomedes Peng, STUDY: XR CHEST 1 VIEW;  12/29/2024 6:38 pm   INDICATION: Signs/Symptoms:increased sob.   COMPARISON: 12/26/2024   ACCESSION NUMBER(S): [id number]   ORDERING CLINICIAN: AFSANEH ADKINS   FINDINGS: There is stable cardiomegaly. There is pulmonary vascular congestive change. There is right pleural effusion and right-sided atelectasis airspace disease. No pneumothorax.       Cardiomegaly with pulmonary vascular congestive change, right pleural effusion and right side atelectasis or airspace disease.   MACRO: None   Signed by: Diomedes Peng 12/29/2024 6:49 PM Dictation workstation:   [id number]    Vascular US Carotid Artery Duplex Bilateral    Result Date: 12/29/2024          [facility] [id number] [place] [address]  [id number]  Tel [phone] and Fax [phone]  Vascular Lab Report [facility] US CAROTID ARTERY DUPLEX BILATERAL  Patient Name:      MOE OTERO ZULMA      Reading Physician:  [id number] Robert Henderson MD, RPVI Study Date:        12/27/2024           Ordering Physician: [id number] TONIA MONTANA MRN/PID:           [id number]             Technologist:       Porsche Marin                                                             RVT Accession#:        [id number]         Technologist 2: Date of Birth/Age: 1955 / 69 years Encounter#:         [id number] Gender:            F Admission Status:  Inpatient            Location Performed: [facility]  Diagnosis/ICD: Syncope and collapse-R55 CPT Codes:     88620 Cerebrovascular Carotid Duplex scan complete  CONCLUSIONS: Right Carotid: Findings are consistent with less than 50% stenosis of the right proximal internal carotid artery. Right external carotid artery appears patent with no evidence of stenosis. The right vertebral artery is patent with antegrade flow. No evidence of hemodynamically significant stenosis in the right subclavian artery. Dampened waveform noted in the right subclavian artery may be suggestive of a more proximal stenosis. Left Carotid: Findings are consistent with less than 50% stenosis of the left proximal internal carotid artery. Laminar flow seen by color Doppler. Left external carotid artery appears patent with no evidence of stenosis. The left vertebral artery is patent with antegrade flow. There is a >50% stenosis noted in the left subclavian artery.  Additional Findings: Technically difficult study due to patient respirations.  Imaging & Doppler Findings: Right Plaque Morph: The proximal right internal carotid artery demonstrates heterogenous and calcified plaque. The proximal right external carotid artery demonstrates  heterogenous and calcified plaque. The mid right common carotid artery demonstrates heterogenous and calcified plaque. The distal right common carotid artery demonstrates heterogenous and calcified plaque. Left Plaque Morph: The proximal left internal carotid artery demonstrates heterogenous and calcified plaque. The proximal left external carotid artery demonstrates heterogenous and calcified plaque. The mid left common carotid artery demonstrates heterogenous and calcified plaque. The distal left common carotid artery demonstrates heterogenous and calcified plaque.   Right                        Left   PSV      EDV                PSV      EDV 71 cm/s            CCA P    75 cm/s 83 cm/s            CCA D    78 cm/s 123 cm/s 24 cm/s   ICA P    117 cm/s 25 cm/s 145 cm/s           ICA M    109 cm/s 108 cm/s           ICA D    89 cm/s 110 cm/s            ECA     131 cm/s 66 cm/s          Vertebral  50 cm/s 191 cm/s         Subclavian 270 cm/s               Right Left ICA/CCA Ratio  1.5  1.5   [id number] Robert Henderson MD, RPVI Electronically signed by [id number] Robert Henderson MD, RPVI on 12/29/2024 at 3:11:38 PM  ** Final **     ECG 12 lead    Result Date: 12/28/2024  Normal sinus rhythm Left axis deviation Minimal voltage criteria for LVH, may be normal variant ( Yung product ) Inferior infarct , age undetermined Anterolateral infarct (cited on or before 26-JUN-2024) Abnormal ECG When compared with ECG of 20-DEC-2024 10:53, No significant change was found See ED provider note for full interpretation and clinical correlation Confirmed by Rose Viera ([id number]) on 12/28/2024 9:59:04 AM    Transthoracic Echo Limited    Result Date: 12/27/2024   [facility], [address] [place] [address]               Tel [phone] and Fax [phone] TRANSTHORACIC ECHOCARDIOGRAM REPORT  Patient Name:       MOE MAYA     Reading Physician:    [id number] Nicholas                                                                Marisela MOSLEY Study Date:         12/27/2024          Ordering Provider:    [id number] DOMONIQUE PURCELL MRN/PID:            [id number]            Fellow: Accession#:         [id number]        Nurse: Date of Birth/Age:  1955 / 69      Sonographer:          Nina mclain                                     RDNALLELY Gender assigned at  F                   Additional Staff: Birth: Height:             167.64 cm           Admit Date:           12/26/2024 Weight:             71.21 kg            Admission Status:     Inpatient -                                                               Routine BSA / BMI:          1.80 m2 / 25.34     Encounter#:           [id number]                     kg/m2 Blood Pressure:     158/90 mmHg         Department Location:  [facility] Non                                                               Invasive Study Type:    TRANSTHORACIC ECHO (TTE) LIMITED Diagnosis/ICD: Syncope and collapse-R55; Other pericardial effusion                (noninflammatory)-I31.39 Indication:    Syncope, Pericardial effusion CPT Code:      Echo Limited-44877; Doppler Limited-76458; Color Doppler-58836 Patient History: Smoker:            Current. Diabetes:          Yes Pertinent History: CHF, HTN, PVD, Hyperlipidemia, A-Fib, CVA, CAD and COPD. Study Detail: The following Echo studies were performed: 2D, Doppler and color               flow.  PHYSICIAN INTERPRETATION: Left Ventricle: The left ventricular systolic function is normal, with a visually estimated ejection fraction of 55-60%. There is severely increased concentric left ventricular hypertrophy. There are no regional left ventricular wall motion abnormalities. The left ventricular cavity size is normal. There is severely increased septal and severely increased posterior left ventricular wall thickness. Spectral Doppler shows a Grade II (pseudonormal pattern) of left  ventricular diastolic filling with an elevated left atrial pressure. Left Atrium: The left atrium is moderately dilated. Right Ventricle: The right ventricle is normal in size. There is normal right ventricular global systolic function. Right Atrium: The right atrium is mildly dilated. Aortic Valve: The aortic valve is trileaflet. There is no evidence of aortic valve regurgitation. Mitral Valve: The mitral valve is normal in structure. There is mild mitral valve regurgitation. Tricuspid Valve: The tricuspid valve is structurally normal. No evidence of tricuspid regurgitation. Pulmonic Valve: The pulmonic valve is structurally normal. There is physiologic pulmonic valve regurgitation. Pericardium: Moderate pericardial effusion. The effusion is circumferential. There is no evidence of cardiac tamponade. Aorta: The aortic root is normal. Pulmonary Veins: The pulmonary veins appear normal and return normally to the left atrium. Systemic Veins: The inferior vena cava appears normal in size, with IVC inspiratory collapse greater than 50%.  CONCLUSIONS:  1. The left ventricular systolic function is normal, with a visually estimated ejection fraction of 55-60%.  2. Spectral Doppler shows a Grade II (pseudonormal pattern) of left ventricular diastolic filling with an elevated left atrial pressure.  3. There is severely increased septal and severely increased posterior left ventricular wall thickness.  4. There is severely increased concentric left ventricular hypertrophy.  5. There is normal right ventricular global systolic function.  6. The left atrium is moderately dilated.  7. Moderate pericardial effusion.  8. There is no evidence of cardiac tamponade. QUANTITATIVE DATA SUMMARY:  2D MEASUREMENTS:          Normal Ranges: IVSd:            1.81 cm  (0.6-1.1cm) LVPWd:           1.72 cm  (0.6-1.1cm) LVIDd:           3.39 cm  (3.9-5.9cm) LVIDs:           2.54 cm LV Mass Index:   135 g/m2 LVEDV Index:     32 ml/m2 LV % FS           25.0 %  LV SYSTOLIC FUNCTION BY 2D PLANIMETRY (MOD):                      Normal Ranges: EF-A4C View:    55 % (>=55%) EF-A2C View:    55 % EF-Biplane:     56 % EF-Visual:      58 % LV EF Reported: 58 %  LV DIASTOLIC FUNCTION:          Normal Ranges: MV Peak E:             1.02 m/s (0.7-1.2 m/s) MV Peak A:             0.40 m/s (0.42-0.7 m/s) E/A Ratio:             2.53     (1.0-2.2)  MITRAL VALVE:          Normal Ranges: MV DT:        178 msec (150-240msec)  TRICUSPID VALVE/RVSP:          Normal Ranges: Peak TR Velocity:     2.22 m/s RV Syst Pressure:     23 mmHg  (< 30mmHg)  [id number] Nicholas Antonio MD Electronically signed on 12/27/2024 at 4:49:23 PM  ** Final **     ECG 12 lead    Result Date: 12/26/2024  Normal sinus rhythm Left axis deviation Anterolateral infarct (cited on or before 26-JUN-2024) Abnormal ECG When compared with ECG of 26-JUN-2024 07:13, No significant change was found See ED provider note for full interpretation and clinical correlation Confirmed by Rose Viera ([id number]) on 12/26/2024 10:31:18 AM    XR chest 1 view    Result Date: 12/26/2024  STUDY: Chest Radiograph;  12/26/2024 8:52 AM INDICATION: Shortness of breath.  Syncope. COMPARISON: Chest, single portable view obtained on 05/19/2024 at 09:50 hours.  XR Chest 04/27/2024. ACCESSION NUMBER(S): [id number] ORDERING CLINICIAN: BRENDON WEN TECHNIQUE:  Frontal chest was obtained at 08:51 hours. FINDINGS: CARDIOMEDIASTINAL SILHOUETTE: Heart is enlarged with increased pulmonary vascularity.  LUNGS: Right-sided pleural effusion slightly increased in size compared to 5/14/2024 with associated right basilar atelectasis and/or consolidation.  ABDOMEN: No remarkable upper abdominal findings.  BONES: No acute osseous changes.    Right-sided pleural effusion slightly increased in size compared to 5/14/2024 with associated right basilar atelectasis and/or consolidation. Signed by Zane Culver MD    XR knee left 4+ views    Result Date:  12/20/2024  Interpreted By:  Lam Cheatham, STUDY: XR KNEE LEFT 4+ VIEWS   INDICATION: Signs/Symptoms:fall, left knee pain.   COMPARISON: February 2, 2024   ACCESSION NUMBER(S): [id number]   ORDERING CLINICIAN: FADY CHAPPELL   FINDINGS: Fairly advanced osteoarthritis of the left knee.   Moderate-sized joint effusion. No evidence of acute fracture.       Moderate arthrosis of the left knee with a joint effusion. No acute osseous abnormality.   Signed by: Lam Cheatham 12/20/2024 11:40 AM Dictation workstation:   [id number]    XR chest 2 views    Result Date: 12/16/2024  Interpreted By:  Lam Cheatham, STUDY: XR CHEST 2 VIEWS   INDICATION: Signs/Symptoms:PA/Lat, SOB, pleural effusion.   COMPARISON: May 19   ACCESSION NUMBER(S): [id number]   ORDERING CLINICIAN: ELDER ARANDA   FINDINGS: Increasing right pleural effusion and basilar airspace disease. This could be pneumonia with parapneumonic effusion or some asymmetric edema.   No pneumothorax. Cardiomegaly unchanged.       Increasing right pleural effusion and basilar airspace disease. This could be pneumonia with parapneumonic effusion or some asymmetric edema.   Signed by: Lam Cheatham 12/16/2024 7:04 AM Dictation workstation:   [id number]          Assessment/Plan   Assessment & Plan  Clotted renal dialysis AV graft, initial encounter (CMS-HCC)  AV fistula not working on the left arm  Consult vascular surgeon patient needs to be transferred to [facility]  I held Aspirin and Plavix for now   VS team Recommend to transfer to [facility],  IR was consulted for  graft thrombectomy.   ESRD (end stage renal disease) (Multi)  On dialysis T.TH and Sat   Chronic respiratory failure  Patient on 3 L nasal cannula which is her basic line.  History of COPD  Continue with same home meds  Hypertension  BP under control  Continue with home meds  Peripheral vascular disease (CMS-HCC)  Held aspirin and Plavix for now per  vascular surgeon possible surgery tomorrow for her  AV fistula access  Paroxysmal atrial fibrillation (Multi)  Not on any anticoagulant  Heart rate under control.  Continue with home meds  On telemetry  Hypothyroidism  Continue with home meds    DVT prophylaxis  On heparin subcutaneous         I spent 65 minutes in the professional and overall care of this patient.      Chantale Hargrove, APRN-CNP

## 2025-01-10 NOTE — CARE PLAN
The patient's goals for the shift include safety    The clinical goals for the shift include safety, and rest      Problem: Diabetes  Goal: Achieve decreasing blood glucose levels by end of shift  Outcome: Progressing  Goal: Increase stability of blood glucose readings by end of shift  Outcome: Progressing  Goal: Decrease in ketones present in urine by end of shift  Outcome: Progressing  Goal: Maintain electrolyte levels within acceptable range throughout shift  Outcome: Progressing  Goal: Maintain glucose levels >70mg/dl to <250mg/dl throughout shift  Outcome: Progressing  Goal: No changes in neurological exam by end of shift  Outcome: Progressing  Goal: Learn about and adhere to nutrition recommendations by end of shift  Outcome: Progressing  Goal: Vital signs within normal range for age by end of shift  Outcome: Progressing  Goal: Increase self care and/or family involovement by end of shift  Outcome: Progressing  Goal: Receive DSME education by end of shift  Outcome: Progressing     Problem: Pain - Adult  Goal: Verbalizes/displays adequate comfort level or baseline comfort level  Outcome: Progressing     Problem: Safety - Adult  Goal: Free from fall injury  Outcome: Progressing     Problem: Discharge Planning  Goal: Discharge to home or other facility with appropriate resources  Outcome: Progressing     Problem: Chronic Conditions and Co-morbidities  Goal: Patient's chronic conditions and co-morbidity symptoms are monitored and maintained or improved  Outcome: Progressing     Problem: Fall/Injury  Goal: Not fall by end of shift  Outcome: Progressing  Goal: Be free from injury by end of the shift  Outcome: Progressing  Goal: Verbalize understanding of personal risk factors for fall in the hospital  Outcome: Progressing  Goal: Verbalize understanding of risk factor reduction measures to prevent injury from fall in the home  Outcome: Progressing  Goal: Use assistive devices by end of the shift  Outcome:  Progressing  Goal: Pace activities to prevent fatigue by end of the shift  Outcome: Progressing     Problem: Skin  Goal: Decreased wound size/increased tissue granulation at next dressing change  Outcome: Progressing  Goal: Participates in plan/prevention/treatment measures  Outcome: Progressing  Goal: Prevent/manage excess moisture  Outcome: Progressing  Goal: Prevent/minimize sheer/friction injuries  Outcome: Progressing  Goal: Promote/optimize nutrition  Outcome: Progressing  Goal: Promote skin healing  Outcome: Progressing

## 2025-01-10 NOTE — CARE PLAN
The patient's goals for the shift include safety and rest    The clinical goals for the shift include pain management, monitor VS, monitor O2 demand, safety, and promote rest      01/09/25 at 10:39 PM - Hyun Espinoza RN

## 2025-01-10 NOTE — ASSESSMENT & PLAN NOTE
Held aspirin and Plavix for now per  vascular surgeon possible surgery tomorrow for her AV fistula access

## 2025-01-10 NOTE — ASSESSMENT & PLAN NOTE
Patient on 3 L nasal cannula which is her basic line.  History of COPD  Continue with same home meds

## 2025-01-10 NOTE — H&P
History Of Present Illness    Daphne Morris is a 69 y.o. female presenting with clotted AV fistula.  Patient was admitted at [facility] yesterday and transferred here because of clotted AV fistula.  Upon arrival to [facility] patient was lethargic and not responding to simple questions.  CT of the head was ordered.  Blood gas was ordered and showed a pH of 7.24 with a pCO2 of 70 and we will be transferring the admission to ICU.  She normally wears 3 L of oxygen at home in the evening.  Patient will find to stimuli but does not answer questions.  I spoke with the son who confirmed that she is DNR/DNI.  Son was also updated on the patient's condition.     Past Medical History  Past Medical History:   Diagnosis Date    Anal fissure 10/12/2023    Anemia     ASHD (arteriosclerotic heart disease)     At risk for falls     Atrial fibrillation (Multi)     CHF (congestive heart failure)     CKD (chronic kidney disease)     COPD (chronic obstructive pulmonary disease) (Multi)     CVA (cerebral vascular accident) (Multi)     2021- right-sided weakness    Depression     Diabetes mellitus (Multi)     GERD (gastroesophageal reflux disease)     H/O pleural effusion     Hyperlipidemia     Hypertension     Hypothyroidism     Hypoxia     Impacted cerumen, left ear     Impacted cerumen of left ear    Noncompliance     Osteoarthritis     Perianal dermatitis 10/12/2023    Personal history of other diseases of the respiratory system     History of acute bronchitis    PVD (peripheral vascular disease) (CMS-HCC)     Renal carcinoma, right (Multi)     s/p partial nephrectomy 8/2021    Respiratory failure (Multi)     TIA (transient ischemic attack)     Vasculitis (CMS-HCC) 10/12/2023    Weakness        Surgical History  Past Surgical History:   Procedure Laterality Date    ANKLE SURGERY      2013    CARDIAC CATHETERIZATION N/A 2/7/2024    Procedure: Right Heart Cath;  Surgeon: Nicholas Antonio MD;  Location: [facility]  Lab;  Service: Cardiovascular;  Laterality: N/A;    CATARACT EXTRACTION Right     2019     SECTION, CLASSIC      MR CHEST ANGIO W AND WO IV CONTRAST  2023    MR CHEST ANGIO W AND WO IV CONTRAST 2023 [facility] MRI    MR HEAD ANGIO WO IV CONTRAST  2021    MR HEAD ANGIO WO IV CONTRAST [facility]    NEPHRECTOMY  2021    SHOULDER SURGERY              Social History  She reports that she has been smoking cigarettes. She started smoking about 56 years ago. She has a 28 pack-year smoking history. She has never been exposed to tobacco smoke. She has never used smokeless tobacco. She reports that she does not currently use alcohol. She reports that she does not currently use drugs after having used the following drugs: Marijuana.    Family History  Family History   Problem Relation Name Age of Onset    Hypertension Mother      Diabetes Father      Heart disease Father      Cancer Sister      Cancer Brother      Diabetes Daughter      Asthma Daughter      Heart attack Daughter      Diabetes Maternal Grandfather      Heart disease Paternal Grandmother      Diabetes Other      Hypertension Other      COPD Other      Other (chronic lung disease) Other          Allergies  Bee venom protein (honey bee); Citalopram; Codeine; Influenza virus vaccines; Latex; Latex, natural rubber; Lisinopril; Penicillins; Adhesive tape-silicones; Amoxicillin; Doxycycline; Oseltamivir; Phenyleph-min oil-petrolatum; Phenyleph-shark oil-glyc-pet; Phenylephrine; Prednisone; Tuberculin ppd; and Xylometazoline    Review of Systems  All systems reviewed and negative except as noted in HPI  Physical Exam  Constitutional: No acute distress, calm, cooperative  HEENT: PERRL, normocephalic, atraumatic, mucous membranes moist  Cardiovascular: Regular rhythm and rate,   Respiratory: Lungs clear to auscultation, bilaterally  Gastrointestinal: Bowel sounds positive x 4, soft, nontender  Neurologic: Does not respond to  commands, but does respond to stimuli.  Equal strength bilaterally  Musculoskeletal: Able to move all extremities, no edema  Skin: Right upper arm dressing intact  Last Recorded Vitals  Blood pressure 120/55, pulse 60, temperature 36.4 °C (97.5 °F), temperature source Temporal, resp. rate 19, SpO2 94%.     Assessment/Plan   Assessment & Plan  Clotted renal dialysis AV graft (CMS-[facility])  Aspirin and Plavix on hold  Vascular surgery consulted  ESRD (end stage renal disease) (Multi)  Consult nephrology (Dr. Mitchell)  Respiratory acidosis with acute on chronic hypoxic respiratory failure  pH of 7.24 with pCO2 of 70  Patient will be transferred to intensive care unit  Metabolic encephalopathy  Likely due to respiratory acidosis with acute hypoxic respiratory failure  CT of the head pending  Hypertension  Continue home amlodipine  Atrial fibrillation  Not on anticoagulation  Hyperlipidemia  Continue home atorvastatin  Hypothyroid  Continue home levothyroxine           I spent 75 minutes in the professional and overall care of this patient.      Luis Ortiz, APRN-CNP

## 2025-01-10 NOTE — PROCEDURES
Central Line    Date/Time: 1/10/2025 4:14 PM    Performed by: GREGORIA Ngo  Authorized by: GREGORIA Ngo    Consent:     Consent obtained:  Verbal    Consent given by:  Patient    Risks, benefits, and alternatives were discussed: yes      Risks discussed:  Arterial puncture, incorrect placement, nerve damage, pneumothorax, infection and bleeding    Alternatives discussed:  No treatment and delayed treatment  Universal protocol:     Procedure explained and questions answered to patient or proxy's satisfaction: yes      Relevant documents present and verified: yes      Test results available: yes      Imaging studies available: yes      Required blood products, implants, devices, and special equipment available: yes      Site/side marked: yes      Immediately prior to procedure, a time out was called: yes      Patient identity confirmed:  Arm band and verbally with patient  Pre-procedure details:     Indication(s): central venous access      Indication(s) comment:  Hemodialysis access    Hand hygiene: Hand hygiene performed prior to insertion      Sterile barrier technique: All elements of maximal sterile technique followed      Skin preparation:  Chlorhexidine    Skin preparation agent: Skin preparation agent completely dried prior to procedure    Sedation:     Sedation type:  None  Anesthesia:     Anesthesia method:  Local infiltration    Local anesthetic:  Lidocaine 1% w/o epi  Procedure details:     Location:  R internal jugular    Patient position:  Supine    Procedural supplies: trialysis.    Catheter length:  15    Landmarks identified: yes      Ultrasound guidance: yes      Ultrasound guidance timing: prior to insertion and real time      Sterile ultrasound techniques: Sterile gel and sterile probe covers were used      Number of attempts:  1    Successful placement: yes    Post-procedure details:     Post-procedure:  Dressing applied and line sutured    Assessment:  Blood return through  all ports, no pneumothorax on x-ray, free fluid flow and placement verified by x-ray    Procedure completion:  Tolerated

## 2025-01-10 NOTE — Clinical Note
Location: right proximal and mid. Balloon inflated. Balloon inflated using multiple inflation technique.

## 2025-01-10 NOTE — H&P
[facility] Critical Care Medicine History & Physical      Date:  1/10/2025  Patient:  Daphne Morris  YOB: 1955  MRN:  [id number]   Admit Date:  1/10/2025  No chief complaint on file.       History of Present Illness:  Daphne Morris is a 69 year old female patient with pmhx of ESRD, anemia, afib, chf, copd, CVA, depression dm, chronic hypoxic respiratory failure on 3L O2 at home, gerd hyperlipidemia, hypertension, hypothyroid, renal cell carcinoma s/p resection in 8/21 and vascultitis. Patient was admitted 1/9 at [facility] for clotted AV fistula and transferred to [facility] to be seen by vascular surgery. Patient on arrival was lethargic and not responding. ABG was ordered showing hypercapnic respiratory acidosis with hypoxemia, patient was placed on bipap and transferred to ICU. Labs on admission also concerning for hyponatremia, hypoglycemia.     ROS:  Review of Systems   Constitutional: Negative.    HENT: Negative.     Eyes: Negative.    Respiratory:  Positive for cough and shortness of breath.    Cardiovascular: Negative.    Gastrointestinal: Negative.    Endocrine: Negative.    Genitourinary: Negative.    Musculoskeletal:  Positive for arthralgias, back pain and myalgias.   Skin: Negative.    Allergic/Immunologic: Negative.    Neurological:  Positive for headaches.   Hematological: Negative.    Psychiatric/Behavioral:  Positive for confusion.        Medical History:  Past Medical History:   Diagnosis Date    Anal fissure 10/12/2023    Anemia     ASHD (arteriosclerotic heart disease)     At risk for falls     Atrial fibrillation (Multi)     CHF (congestive heart failure)     CKD (chronic kidney disease)     COPD (chronic obstructive pulmonary disease) (Multi)     CVA (cerebral vascular accident) (Multi)     2021- right-sided weakness    Depression     Diabetes mellitus (Multi)     GERD (gastroesophageal reflux disease)     H/O pleural effusion     Hyperlipidemia     Hypertension     Hypothyroidism      "Hypoxia     Impacted cerumen, left ear     Impacted cerumen of left ear    Noncompliance     Osteoarthritis     Perianal dermatitis 10/12/2023    Personal history of other diseases of the respiratory system     History of acute bronchitis    PVD (peripheral vascular disease) (CMS-HCC)     Renal carcinoma, right (Multi)     s/p partial nephrectomy 2021    Respiratory failure (Multi)     TIA (transient ischemic attack)     Vasculitis (CMS-HCC) 10/12/2023    Weakness      Past Surgical History:   Procedure Laterality Date    ANKLE SURGERY          CARDIAC CATHETERIZATION N/A 2024    Procedure: Right Heart Cath;  Surgeon: Nicholas Antonio MD;  Location: [facility] Cardiac Cath Lab;  Service: Cardiovascular;  Laterality: N/A;    CATARACT EXTRACTION Right     2019     SECTION, CLASSIC      MR CHEST ANGIO W AND WO IV CONTRAST  2023    MR CHEST ANGIO W AND WO IV CONTRAST 2023 [facility] MRI    MR HEAD ANGIO WO IV CONTRAST  2021    MR HEAD ANGIO WO IV CONTRAST [facility]    NEPHRECTOMY  2021    SHOULDER SURGERY           Medications Prior to Admission   Medication Sig Dispense Refill Last Dose/Taking    acetaminophen (Tylenol) 500 mg tablet Take 2 tablets (1,000 mg) by mouth 2 times a day.       albuterol 90 mcg/actuation inhaler INHALE 2 PUFFS BY MOUTH EVERY 4 HOURS AS NEEDED 8.5 g 0     amiodarone (Pacerone) 200 mg tablet TAKE 1 TABLET BY MOUTH ONCE DAILY WITH BREAKFAST. 90 tablet 0     amLODIPine (Norvasc) 5 mg tablet Take 1 tablet (5 mg) by mouth once daily.       aspirin 81 mg EC tablet Take 1 tablet (81 mg) by mouth once daily.       atorvastatin (Lipitor) 40 mg tablet TAKE ONE TABLET BY MOUTH EVERY DAY 90 tablet 3     BD Calista 2nd Gen Pen Needle 32 gauge x \" needle USE SUBCUTANEOUSLY AS DIRECTED TWICE DAILY 200 each 2     clopidogrel (Plavix) 75 mg tablet Take 1 tablet (75 mg) by mouth once daily.       ferrous gluconate 324 (38 Fe) mg tablet Take 1 tablet (324 " mg) by mouth once daily with breakfast. 30 tablet 3     FreeStyle Shakir 14 Day Sensor kit USE AS DIRECTED TO CHECK SUGARS 3 TO 4 TIMES DAILY 1 each 11     FreeStyle Shakir 2 [place] misc Use as instructed 1 each 0     FreeStyle Shakir 3 [place] misc Use as instructed 1 each 0     FreeStyle Shakir 3 Sensor device Change sensor Q 14 days 2 each 11     HumaLOG KwikPen Insulin 100 unit/mL injection up to 15 units Subcutaneous three times a day per sliding scaleup to 15 units Subcutaneous three times a day per sliding scale (Patient taking differently: up to 15 units Subcutaneous three times a day per sliding scale.      151-200 2 units  201-250 4 units  251-300 6 units  301-350 8 units  351-400 10 units) 5 each 3     ipratropium-albuteroL (Duo-Neb) 0.5-2.5 mg/3 mL nebulizer solution INHALE THE CONTENTS OF 1 VIAL VIA NEBULIZER EVERY 6 HOURS AS NEEDED. 360 mL 0     levothyroxine (Synthroid, Levoxyl) 200 mcg tablet Take 1 tablet by mouth once daily in the morning before a meal. 90 tablet 3     levothyroxine (Synthroid, Levoxyl) 50 mcg tablet Take 1 tablet (50 mcg) by mouth once daily in the morning. Take before meals. 90 tablet 3     metoprolol succinate XL (Toprol-XL) 50 mg 24 hr tablet TAKE ONE TABLET BY MOUTH TWO TIMES A  tablet 0     oxygen DME/Hospice (O2) therapy Inhale 3 L/min once daily at bedtime. Indications: decreased oxygen in the tissues or blood       pantoprazole (ProtoNix) 40 mg EC tablet TAKE ONE TABLET BY MOUTH TWO TIMES A DAY 60 tablet 0     venlafaxine XR (Effexor-XR) 75 mg 24 hr capsule Take 2 capsules (150 mg) by mouth once daily. 180 capsule 3      Bee venom protein (honey bee); Citalopram; Codeine; Influenza virus vaccines; Latex; Latex, natural rubber; Lisinopril; Penicillins; Adhesive tape-silicones; Amoxicillin; Doxycycline; Oseltamivir; Phenyleph-min oil-petrolatum; Phenyleph-shark oil-glyc-pet; Phenylephrine; Prednisone; Tuberculin ppd; and Xylometazoline  Social History     Tobacco Use     Smoking status: Every Day     Current packs/day: 0.50     Average packs/day: 0.5 packs/day for 56.0 years (28.0 ttl pk-yrs)     Types: Cigarettes     Start date: 1/1/1969     Passive exposure: Never    Smokeless tobacco: Never   Vaping Use    Vaping status: Never Used   Substance Use Topics    Alcohol use: Not Currently    Drug use: Not Currently     Types: Marijuana     Family History   Problem Relation Name Age of Onset    Hypertension Mother      Diabetes Father      Heart disease Father      Cancer Sister      Cancer Brother      Diabetes Daughter      Asthma Daughter      Heart attack Daughter      Diabetes Maternal Grandfather      Heart disease Paternal Grandmother      Diabetes Other      Hypertension Other      COPD Other      Other (chronic lung disease) Other         Hospital Medications:         Current Facility-Administered Medications:     acetaminophen (Tylenol) tablet 650 mg, 650 mg, oral, q4h PRN **OR** acetaminophen (Tylenol) oral liquid 650 mg, 650 mg, oral, q4h PRN **OR** acetaminophen (Tylenol) suppository 650 mg, 650 mg, rectal, q4h PRN, Jackelyn Kaminski DO    albuterol 2.5 mg /3 mL (0.083 %) nebulizer solution 2.5 mg, 2.5 mg, nebulization, q6h PRN, Jackelyn Kaminski DO    [START ON 1/11/2025] amiodarone (Pacerone) tablet 200 mg, 200 mg, oral, Daily with breakfast, Jackelyn Kaminski DO    [START ON 1/11/2025] amLODIPine (Norvasc) tablet 5 mg, 5 mg, oral, Daily, Jackelyn Kaminski DO    [Held by provider] aspirin EC tablet 81 mg, 81 mg, oral, Daily, Jackelyn Kaminski DO    atorvastatin (Lipitor) tablet 40 mg, 40 mg, oral, Nightly, Jackelyn Kaminski DO    benzocaine-menthol (Cepastat Sore Throat) lozenge 1 lozenge, 1 lozenge, Mouth/Throat, q2h PRN, Jackelyn Kaminski DO    bisacodyl (Dulcolax) EC tablet 10 mg, 10 mg, oral, Daily PRN, Jackelyn Kaminski DO    [Held by provider] clopidogrel (Plavix) tablet 75 mg, 75 mg, oral, Daily, Jackelyn Kaminski, DO     dextromethorphan-guaifenesin (Robitussin DM)  mg/5 mL oral liquid 5 mL, 5 mL, oral, q4h PRN, Jackelyn Kaminski DO    dextrose 50 % injection 12.5 g, 12.5 g, intravenous, q15 min PRN, Jackelyn Kaminski DO, 12.5 g at 01/10/25 1327    dextrose 50 % injection 25 g, 25 g, intravenous, q15 min PRN, Jackelyn Kaminski DO    epoetin debra-epbx (Retacrit) injection 5,000 Units, 5,000 Units, subcutaneous, Once per day on Monday Wednesday Friday, Tanvi uHggins MD    glucagon (Glucagen) injection 1 mg, 1 mg, intramuscular, q15 min PRN, Jackelyn Kaminski DO    glucagon (Glucagen) injection 1 mg, 1 mg, intramuscular, q15 min PRN, Jackelyn Kaminski DO    guaiFENesin (Mucinex) 12 hr tablet 600 mg, 600 mg, oral, q12h PRN, Jackelyn Kaminski DO    heparin 1,000 unit/mL injection 2,000 Units, 2,000 Units, intra-catheter, After Dialysis, Jackleyn Kaminski DO    heparin 1,000 unit/mL injection 2,000 Units, 2,000 Units, intra-catheter, After Dialysis, Jackelyn Kaminski DO    [START ON 1/11/2025] levothyroxine (Synthroid, Levoxyl) tablet 200 mcg, 200 mcg, oral, Daily, Jackelyn Kaminski DO    [START ON 1/11/2025] levothyroxine (Synthroid, Levoxyl) tablet 50 mcg, 50 mcg, oral, Daily, Jackelyn Kaminski DO    metoprolol succinate XL (Toprol-XL) 24 hr tablet 50 mg, 50 mg, oral, BID, Jackelyn Kaminski DO    oxygen (O2) therapy, , inhalation, Continuous - Inhalation, Jackelyn Kaminski DO    pantoprazole (ProtoNix) EC tablet 40 mg, 40 mg, oral, BID, Jackelyn Kaminski DO    prochlorperazine (Compazine) tablet 10 mg, 10 mg, oral, q6h PRN **OR** prochlorperazine (Compazine) injection 10 mg, 10 mg, intravenous, q6h PRN **OR** prochlorperazine (Compazine) suppository 25 mg, 25 mg, rectal, q12h PRN, Jackelyn Kaminski DO    [START ON 1/11/2025] venlafaxine XR (Effexor-XR) 24 hr capsule 150 mg, 150 mg, oral, Daily, Jackelyn Kaminski DO    Physical Exam:  Heart Rate:  [60-68]   Temp:  [36.4 °C (97.5 °F)-36.5 °C (97.7  °F)]   Resp:  [14-26]   BP: (100-165)/(55-88)   SpO2:  [90 %-97 %]     FiO2 (%):  [40 %] 40 %  S RR:  [16] 16    Physical Exam  Constitutional:       Appearance: She is underweight.      Interventions: Face mask in place.   HENT:      Nose: Nose normal.      Mouth/Throat:      Mouth: Mucous membranes are dry.   Eyes:      Pupils: Pupils are equal, round, and reactive to light.   Cardiovascular:      Rate and Rhythm: Regular rhythm. Bradycardia present.      Pulses: Normal pulses.   Pulmonary:      Effort: Pulmonary effort is normal.   Abdominal:      General: Abdomen is flat.      Palpations: Abdomen is soft.   Musculoskeletal:         General: Normal range of motion.      Cervical back: Normal range of motion.   Skin:     General: Skin is warm and dry.      Capillary Refill: Capillary refill takes less than 2 seconds.   Neurological:      General: No focal deficit present.      Mental Status: She is alert, oriented to person, place, and time and easily aroused. Mental status is at baseline.   Psychiatric:         Mood and Affect: Mood normal.         Objective:  No intake or output data in the 24 hours ending 01/10/25 1430    Results for orders placed or performed during the hospital encounter of 01/10/25 (from the past 24 hours)   Blood Gas Arterial Full Panel   Result Value Ref Range    POCT pH, Arterial 7.24 (LL) 7.38 - 7.42 pH    POCT pCO2, Arterial 70 (HH) 38 - 42 mm Hg    POCT pO2, Arterial 69 (L) 85 - 95 mm Hg    POCT SO2, Arterial 96 94 - 100 %    POCT Oxy Hemoglobin, Arterial 93.6 (L) 94.0 - 98.0 %    POCT Hematocrit Calculated, Arterial 31.0 (L) 36.0 - 46.0 %    POCT Sodium, Arterial 123 (L) 136 - 145 mmol/L    POCT Potassium, Arterial 4.2 3.5 - 5.3 mmol/L    POCT Chloride, Arterial 92 (L) 98 - 107 mmol/L    POCT Ionized Calcium, Arterial 1.17 1.10 - 1.33 mmol/L    POCT Glucose, Arterial 60 (L) 74 - 99 mg/dL    POCT Lactate, Arterial 0.3 (L) 0.4 - 2.0 mmol/L    POCT Base Excess, Arterial 1.4 -2.0 - 3.0  mmol/L    POCT HCO3 Calculated, Arterial 30.0 (H) 22.0 - 26.0 mmol/L    POCT Hemoglobin, Arterial 10.3 (L) 12.0 - 16.0 g/dL    POCT Anion Gap, Arterial 5 (L) 10 - 25 mmo/L    Patient Temperature 37.0 degrees Celsius    FiO2 36 %    Apparatus CANNULA     Critical Called By MARCELA BRASWELL RRT     Critical Called To DR HAYS     Critical Call Time 1237     Critical Read Back Y     Critical Note CRVR     Site of Arterial Puncture Radial Left     Jonathan's Test Positive    POCT GLUCOSE   Result Value Ref Range    POCT Glucose 56 (L) 74 - 99 mg/dL   POCT GLUCOSE   Result Value Ref Range    POCT Glucose 111 (H) 74 - 99 mg/dL     *Note: Due to a large number of results and/or encounters for the requested time period, some results have not been displayed. A complete set of results can be found in Results Review.       Recent Imaging  CT head wo IV contrast   Final Result   No acute intracranial pathologic findings are identified.   Age-related intracranial findings are present, stable.   Abnormal appearance of the right eye for which clinical correlation   is recommended as above        MACRO:   none        Signed by: Silver Vargas 1/10/2025 2:07 PM   Dictation workstation:   [id number]      IR CVC tunneled    (Results Pending)   XR chest 1 view    (Results Pending)       Transthoracic Echo Limited    Result Date: 12/27/2024   [facility], [address] [place] [address]               Tel [phone] and Fax [phone] TRANSTHORACIC ECHOCARDIOGRAM REPORT  Patient Name:       MOE MAYA     Reading Physician:    [id number] Nicholas Antonio MD Study Date:         12/27/2024          Ordering Provider:    [id number] DOMONIQUE PURCELL MRN/PID:            [id number]            Fellow: Accession#:         [id number]        Nurse: Date of Birth/Age:  1955 / 69       Sonographer:          Nina Angel                     years                                     RDCS Gender assigned at  F                   Additional Staff: Birth: Height:             167.64 cm           Admit Date:           12/26/2024 Weight:             71.21 kg            Admission Status:     Inpatient -                                                               Routine BSA / BMI:          1.80 m2 / 25.34     Encounter#:           [id number]                     kg/m2 Blood Pressure:     158/90 mmHg         Department Location:  [facility]                                                               Invasive Study Type:    TRANSTHORACIC ECHO (TTE) LIMITED Diagnosis/ICD: Syncope and collapse-R55; Other pericardial effusion                (noninflammatory)-I31.39 Indication:    Syncope, Pericardial effusion CPT Code:      Echo Limited-12572; Doppler Limited-23092; Color Doppler-12457 Patient History: Smoker:            Current. Diabetes:          Yes Pertinent History: CHF, HTN, PVD, Hyperlipidemia, A-Fib, CVA, CAD and COPD. Study Detail: The following Echo studies were performed: 2D, Doppler and color               flow.  PHYSICIAN INTERPRETATION: Left Ventricle: The left ventricular systolic function is normal, with a visually estimated ejection fraction of 55-60%. There is severely increased concentric left ventricular hypertrophy. There are no regional left ventricular wall motion abnormalities. The left ventricular cavity size is normal. There is severely increased septal and severely increased posterior left ventricular wall thickness. Spectral Doppler shows a Grade II (pseudonormal pattern) of left ventricular diastolic filling with an elevated left atrial pressure. Left Atrium: The left atrium is moderately dilated. Right Ventricle: The right ventricle is normal in size. There is normal right ventricular global systolic function. Right Atrium: The right atrium is mildly dilated. Aortic  Valve: The aortic valve is trileaflet. There is no evidence of aortic valve regurgitation. Mitral Valve: The mitral valve is normal in structure. There is mild mitral valve regurgitation. Tricuspid Valve: The tricuspid valve is structurally normal. No evidence of tricuspid regurgitation. Pulmonic Valve: The pulmonic valve is structurally normal. There is physiologic pulmonic valve regurgitation. Pericardium: Moderate pericardial effusion. The effusion is circumferential. There is no evidence of cardiac tamponade. Aorta: The aortic root is normal. Pulmonary Veins: The pulmonary veins appear normal and return normally to the left atrium. Systemic Veins: The inferior vena cava appears normal in size, with IVC inspiratory collapse greater than 50%.  CONCLUSIONS:  1. The left ventricular systolic function is normal, with a visually estimated ejection fraction of 55-60%.  2. Spectral Doppler shows a Grade II (pseudonormal pattern) of left ventricular diastolic filling with an elevated left atrial pressure.  3. There is severely increased septal and severely increased posterior left ventricular wall thickness.  4. There is severely increased concentric left ventricular hypertrophy.  5. There is normal right ventricular global systolic function.  6. The left atrium is moderately dilated.  7. Moderate pericardial effusion.  8. There is no evidence of cardiac tamponade. QUANTITATIVE DATA SUMMARY:  2D MEASUREMENTS:          Normal Ranges: IVSd:            1.81 cm  (0.6-1.1cm) LVPWd:           1.72 cm  (0.6-1.1cm) LVIDd:           3.39 cm  (3.9-5.9cm) LVIDs:           2.54 cm LV Mass Index:   135 g/m2 LVEDV Index:     32 ml/m2 LV % FS          25.0 %  LV SYSTOLIC FUNCTION BY 2D PLANIMETRY (MOD):                      Normal Ranges: EF-A4C View:    55 % (>=55%) EF-A2C View:    55 % EF-Biplane:     56 % EF-Visual:      58 % LV EF Reported: 58 %  LV DIASTOLIC FUNCTION:          Normal Ranges: MV Peak E:             1.02 m/s  (0.7-1.2 m/s) MV Peak A:             0.40 m/s (0.42-0.7 m/s) E/A Ratio:             2.53     (1.0-2.2)  MITRAL VALVE:          Normal Ranges: MV DT:        178 msec (150-240msec)  TRICUSPID VALVE/RVSP:          Normal Ranges: Peak TR Velocity:     2.22 m/s RV Syst Pressure:     23 mmHg  (< 30mmHg)  [id number] Nicholas Antonio MD Electronically signed on 12/27/2024 at 4:49:23 PM  ** Final **      Assessment/Plan   Assessment & Plan  Clotted renal dialysis AV graft (CMS-HCC)    ESRD (end stage renal disease) (Multi)        Plan:  Neuro/Psych/Pain Ctrl/Sedation:  Acute hypoxic/hypercapnic encephalopathy  Hx of depression, cva  - CAM ICU score q-shift  - Sleep/wake cycle hygiene  - Delirium precaution   - Pain Management: tylenol prn  - Q4 Neuro assessments  - Continue Effexor    Respiratory/ENT:  Acute on chronic hypercapnic, hypoxemic respiratory failure  COPD  Right sided pleural effusion  Admitted to ICU on Bipap 20/8 40%, wean as appropriate  R sided thoracentesis with 1L removal on 12/31  - Continuous Pulse oximetry  - VBG PRN to assist with weaning bipap  - Wean Fio2 to maintain Spo2>92%  - Bronchial hygiene and IS  - Duonebs q6    Cardiovascular:  Thrombosis of AF fistula  Hx of afib, hypertension, hyperlipid, HFpEF, vasculitis   Echo 12/24: EF of 55-60%, elevated left atrial pressure, severly increased septal and posterior left ventricular wall thickness, severely enlarge LV hypertrophy,   - Continuous Telemetry  - Systolic goals >90 and < 160  - Vascular surgery consulted  - IR consulted for fistula graft declot  - Continue home amlodipine lipitor, metoprolol, amiodarone  - Holding asa and plavix while awaiting ir procedure    Renal/Volume Status (Intra & Extravascular):  ESRD  Will receive emergent HD today  - Maintain urine output 0.5-1.0cc/kg/hr  - Monitor I/O's  - Replete electrolytes to maintain K >4.0 and Mg >2.0  - Daily BMP, Mg   - Nephrology consulted    GI:  Hx of GERD  - Diet: NPO with sips with  meds while requiring bipap  - PPI: home protonix  - BR: miralax    Infectious Disease:  Positive flu A on 12/31  No infectious disease concerns at this time  - Pleural fluid tap negative  - Daily CBC    Heme/Onc:  Chronic anemia  Hx of renal cell carcinoma s/p resection '21  - DVT Prophylaxis: SCDs and heparin  - Transfuse for hemoglobin < 7  - Continue epoetin injection    Endocrine:  Hypoglycemia, possible symptomatic  Hypothyroid  - POCT q4  - HGB A1C 8.1 (1/1/25)  - TSH and free t4 pending  - Continue home synthroid    OBGYN/Ortho:  - PT/OT when stable    Ethics/Code Status:  DNR DNI  Consider palliative consult if no respiratory improvment    :  DVT Prophylaxis: SCDs and heparin  GI Prophylaxis: PPI home  Bowel Regimen: Miralax prn  Diet: NPO with sips with meds  CVC: RIJ trialysis  [place]: None  Fay: None  Restraints: none  Dispo: Will be admitted to ICU    Critical Care Time:  45 minutes spent in preparing to see patient (I.e. review of medical records), evaluation of diagnostics (I.e. labs, imaging, etc.), documentation, discussing plan of care with patient/ family/ caregiver, and/ or coordination of care with multidisciplinary team. Time does not include completion of procedure time.     Critical Care Activities:     Tyrone Ramesh, APRN-CNP

## 2025-01-10 NOTE — CONSULTS
Nutrition Assessement Note    Nutrition Assessment    Reason for Assessment: Admission nursing screening (Wounds)    Reason for Hospital Admission:  Daphne Morris is a 69 y.o. female who is admitted for clotted renal dialysis AV graft. P was transferred from [facility] because of clotted AV fistula. Upon arrival to [facility] patient was lethargic and not responding to simple questions. Will provide andre BID and mighty shakes.     Malnutrition Screening Tool (MST)  Have you recently lost weight without trying?: No  If yes, how much weight have you lost?: Lost 2 - 13 pounds  Weight Loss Score: 0  Have you been eating poorly because of a decreased appetite?: No  Malnutrition Score: 0  Nutrition Screen  Stage 3 or 4 Pressure Injury or Multiple Non-Healing Wounds: No  Home Tube Feeding or Total Parenteral Nutrition (TPN): No  Dietitian Consult Needed: Yes (Comment)  Reason for Consult: wounds    Past Medical History:   Diagnosis Date    Anal fissure 10/12/2023    Anemia     ASHD (arteriosclerotic heart disease)     At risk for falls     Atrial fibrillation (Multi)     CHF (congestive heart failure)     CKD (chronic kidney disease)     COPD (chronic obstructive pulmonary disease) (Multi)     CVA (cerebral vascular accident) (Multi)     2021- right-sided weakness    Depression     Diabetes mellitus (Multi)     GERD (gastroesophageal reflux disease)     H/O pleural effusion     Hyperlipidemia     Hypertension     Hypothyroidism     Hypoxia     Impacted cerumen, left ear     Impacted cerumen of left ear    Noncompliance     Osteoarthritis     Perianal dermatitis 10/12/2023    Personal history of other diseases of the respiratory system     History of acute bronchitis    PVD (peripheral vascular disease) (CMS-HCC)     Renal carcinoma, right (Multi)     s/p partial nephrectomy 8/2021    Respiratory failure (Multi)     TIA (transient ischemic attack)     Vasculitis (CMS-HCC) 10/12/2023    Weakness       Past Surgical History:    Procedure Laterality Date    ANKLE SURGERY          CARDIAC CATHETERIZATION N/A 2024    Procedure: Right Heart Cath;  Surgeon: Nicholas Antonio MD;  Location: [facility] Cardiac Cath Lab;  Service: Cardiovascular;  Laterality: N/A;    CATARACT EXTRACTION Right     2019     SECTION, CLASSIC      MR CHEST ANGIO W AND WO IV CONTRAST  2023    MR CHEST ANGIO W AND WO IV CONTRAST 2023 [facility] MRI    MR HEAD ANGIO WO IV CONTRAST  2021    MR HEAD ANGIO WO IV CONTRAST [facility]    NEPHRECTOMY  2021    SHOULDER SURGERY             Nutrition History:  Food and Nutrient History: During her last admission (between -) pt had an average of 54% PO intake        Food Allergies/Intolerances:  None    Anthropometrics:  Ht:  , Wt:  , BMI:               Weight Change:  Daily Weight  25 : 70.3 kg (155 lb)  25 : 70.7 kg (155 lb 13.8 oz)  24 : 76.4 kg (168 lb 6.9 oz)  24 : 74.8 kg (165 lb)  24 : 71.2 kg (157 lb)  24 : 69.9 kg (154 lb)  24 : 69.9 kg (154 lb)  24 : 69.9 kg (154 lb)  24 : 69.9 kg (154 lb)  24 : 71 kg (156 lb 9.6 oz)     Weight History / % Weight Change: stable weight for 5 months             Nutrition Focused Physical Exam Findings:                       Nutrition Significant Labs:  Lab Results   Component Value Date    WBC 10.6 01/10/2025    HGB 10.5 (L) 01/10/2025    HCT 34.2 (L) 01/10/2025     01/10/2025    CHOL 128 2023    TRIG 100 2023    HDL 49.0 2023    ALT 7 2025    AST 10 2025     (L) 01/10/2025    K 4.2 01/10/2025    CL 91 (L) 01/10/2025    CREATININE 3.83 (H) 01/10/2025    BUN 48 (H) 01/10/2025    CO2 23 01/10/2025    TSH 49.00 (H) 2025    INR 1.0 2024    HGBA1C 8.8 (H) 2025    ALBUR 1,714 (H) 07/15/2020     Nutrition Specific Medications:  [START ON 2025] amiodarone, 200 mg, oral, Daily with breakfast  [START ON 2025]  amLODIPine, 5 mg, oral, Daily  [Held by provider] aspirin, 81 mg, oral, Daily  atorvastatin, 40 mg, oral, Nightly  [Held by provider] clopidogrel, 75 mg, oral, Daily  epoetin debra or biosimilar, 5,000 Units, subcutaneous, Once per day on Monday Wednesday Friday  heparin, 2,000 Units, intra-catheter, After Dialysis  heparin, 2,000 Units, intra-catheter, After Dialysis  [START ON 1/11/2025] levothyroxine, 200 mcg, oral, Daily  [START ON 1/11/2025] levothyroxine, 50 mcg, oral, Daily  metoprolol succinate XL, 50 mg, oral, BID  oxygen, , inhalation, Continuous - Inhalation  pantoprazole, 40 mg, oral, BID  [START ON 1/11/2025] venlafaxine XR, 150 mg, oral, Daily      Dietary Orders (From admission, onward)       Start     Ordered    01/10/25 1325  May Participate in Room Service  ( ROOM SERVICE MAY PARTICIPATE)  Once        Question:  .  Answer:  Yes    01/10/25 1324    01/10/25 1224  Adult diet Regular  Diet effective now        Question:  Diet type  Answer:  Regular    01/10/25 1231                     Estimated Needs:   Estimated Energy Needs  Total Energy Estimated Needs (kCal): 2109 kCal  Total Estimated Energy Need per Day (kCal/kg): 30 kCal/kg  Method for Estimating Needs: actual wt    Estimated Protein Needs  Total Protein Estimated Needs (g):  (84-91)  Total Protein Estimated Needs (g/kg):  (1.2-1.3)  Method for Estimating Needs: actual wt    Estimated Fluid Needs  Method for Estimating Needs: urine output + 500-1000 mL/day        Nutrition Diagnosis   Nutrition Diagnosis:       Nutrition Diagnosis  Patient has Nutrition Diagnosis: Yes  Diagnosis Status (1): New  Nutrition Diagnosis 1: Increased nutrient needs  Related to (1): increased demand for nutrient  As Evidenced by (1): dialysis, wounds       Nutrition Interventions/Recommendations   Nutrition Interventions and Recommendations:    Nutrition Prescription:  Individualized Nutrition Prescription Provided for : 2109 kcals, 84-91 g protein via  diet    Nutrition Interventions:   Food and/or Nutrient Delivery Interventions  Interventions: Meals and snacks, Medical food supplement  Meals and Snacks: Mineral-modified diet  Goal: recommend a renal diet  Medical Food Supplement: Commercial beverage  Goal: andre BID to aid in wound healing; mighty shake TID to provide 200 kcals and 7g protein each    Education Documentation  No documentation found.           Nutrition Monitoring and Evaluation   Monitoring/Evaluation:   Food/Nutrient Related History Monitoring  Monitoring and Evaluation Plan: Energy intake  Energy Intake: Estimated energy intake  Criteria: pt to consume >/= 75% estimated needs      Nutrition Focused Physical Findings  Monitoring and Evaluation Plan: Skin  Skin: Impaired wound healing  Criteria: pt will show signs of improvement in skin integrity         Time Spent/Follow-up:   Follow Up  Time Spent (min): 30 minutes  Last Date of Nutrition Visit: 01/10/25  Nutrition Follow-Up Needed?: 3-5 days  Follow up Comment: 1/14/25

## 2025-01-10 NOTE — ASSESSMENT & PLAN NOTE
Consult nephrology (Dr. Mitchell)  Respiratory acidosis with acute on chronic hypoxic respiratory failure  pH of 7.24 with pCO2 of 70  Patient will be transferred to intensive care unit  Metabolic encephalopathy  Likely due to respiratory acidosis with acute hypoxic respiratory failure  CT of the head pending  Hypertension  Continue home amlodipine  Atrial fibrillation  Not on anticoagulation  Hyperlipidemia  Continue home atorvastatin  Hypothyroid  Continue home levothyroxine

## 2025-01-10 NOTE — PROGRESS NOTES
Physical Therapy                 Therapy Communication Note    Patient Name: Daphne Morris  MRN: [id number]  Department: [facility] ICU  Room: 10/10-A  Today's Date: 1/10/2025     Discipline: Physical Therapy          Missed Visit Reason: Missed Visit Reason: Cancel, Other (Comment) (RN deferring therapy; patient just transferred to ICU and is not medically stable for therapy at this time.)    Missed Time: Cancel    Comment:

## 2025-01-10 NOTE — ASSESSMENT & PLAN NOTE
AV fistula not working on the left arm  Consult vascular surgeon patient needs to be transferred to [facility]  I held Aspirin and Plavix for now   VS team Recommend to transfer to [facility],  IR was consulted for  graft thrombectomy.

## 2025-01-10 NOTE — TELEPHONE ENCOUNTER
Patient is active with [facility]. Patient admitted to [facility] 1/9/25. Per H/P: Presented to ED from dialysis d/t AV fistula not working. Per Vascular consult: On physical exam, her graft is occluded with no thrill or bruit. Recommend transfer to [facility] or possibly a boomerang to [facility] from [facility] for IR consultation and graft thrombectomy.  She may also need a tunneled dialysis catheter if this is not successful. [facility] will monitor hospitalization and discharge plan.

## 2025-01-10 NOTE — CONSULTS
CONSULT: Nephrology SERVICE    SERVICE DATE: 1/10/2025   SERVICE TIME:  12:34 PM    REASON FOR CONSULT: End-stage renal disease   REQUESTING PHYSICIAN: Dr. Kaminski  PRIMARY CARE PHYSICIAN: CARLENE King-CNP    ASSESSMENT AND PLAN  69-year-old woman in on dialysis admitted with clotted arteriovenous graft, now alteration in mental status.  1.  End-stage renal disease  2.  Malfunctioning AVG  3.  Edema  4.  Fluid overload  5.  Essential hypertension  6.  Anemia of chronic kidney disease    Azem transcribing this, the arterial blood gas came back indicating hypercarbic and hypoxemic respiratory failure.  Certainly there is some component of fluid overload at play here.  Unfortunately, I have no working dialysis access.  To complicate matters, she is likely in need of noninvasive positive pressure ventilation and has alteration in mental status.  I think she needs to transfer to a higher acuity bed and will probably need placement of a temporary hemodialysis catheter to facilitate volume removal with dialysis.  I will attempt up to 3 L volume removal today via bedside dialysis once the above is arranged.  Unlikely that she will be taken to the interventional radiology suite and her current hypercarbic and hypoxemic state.   May require additional dialysis tomorrow depending on how she does today.  Erythropoietin stimulating agents to be given with dialysis.  Case discussed extensively with Dr. Kaminski.  Thank you for the consultation.  Dr. Romeo will see this patient tomorrow.     SUBJECTIVE  Ms. Morris is a 69 y.o. woman with a medical history of COPD, prior strokes, diabetes, and end-stage renal disease on hemodialysis admitted due to clotted AVG, consulted for dialysis management.  This woman was last dialyzed 4 days ago at [facility].  She went to her regularly scheduled outpatient dialysis yesterday at [facility] in [place], but her right upper extremity graft had no thrill or bruit.  She was sent  to [facility], thereafter [facility].  She awaits interventional radiology declot versus tunneled dialysis catheter implantation.  At the time of my evaluation, she was altered, largely nonverbal with me, but she would respond to voice.  She looks like she is a little bit winded, mildly increased work of breathing, certainly she is somnolent.  She was recently seen at [facility] after falls, thereafter [facility], was diagnosed with influenza, hypoglycemia, hyponatremia.  She received dialysis while at [facility].  Generally she is compliant with dialysis.  Have not seen her in about 6 weeks.    PAST MEDICAL HISTORY:   Past Medical History:   Diagnosis Date    Anal fissure 10/12/2023    Anemia     ASHD (arteriosclerotic heart disease)     At risk for falls     Atrial fibrillation (Multi)     CHF (congestive heart failure)     CKD (chronic kidney disease)     COPD (chronic obstructive pulmonary disease) (Multi)     CVA (cerebral vascular accident) (Multi)     2021- right-sided weakness    Depression     Diabetes mellitus (Multi)     GERD (gastroesophageal reflux disease)     H/O pleural effusion     Hyperlipidemia     Hypertension     Hypothyroidism     Hypoxia     Impacted cerumen, left ear     Impacted cerumen of left ear    Noncompliance     Osteoarthritis     Perianal dermatitis 10/12/2023    Personal history of other diseases of the respiratory system     History of acute bronchitis    PVD (peripheral vascular disease) (CMS-HCC)     Renal carcinoma, right (Multi)     s/p partial nephrectomy 8/2021    Respiratory failure (Multi)     TIA (transient ischemic attack)     Vasculitis (CMS-HCC) 10/12/2023    Weakness      PAST SURGICAL HISTORY:   Past Surgical History:   Procedure Laterality Date    ANKLE SURGERY      2013    CARDIAC CATHETERIZATION N/A 2/7/2024    Procedure: Right Heart Cath;  Surgeon: Nicholas Antonio MD;  Location: [facility] Cardiac Cath Lab;  Service: Cardiovascular;  Laterality: N/A;    CATARACT  EXTRACTION Right     2019     SECTION, CLASSIC      MR CHEST ANGIO W AND WO IV CONTRAST  2023    MR CHEST ANGIO W AND WO IV CONTRAST 2023 [facility] MRI    MR HEAD ANGIO WO IV CONTRAST  2021    MR HEAD ANGIO WO IV CONTRAST [facility]    NEPHRECTOMY  2021    SHOULDER SURGERY           FAMILY HISTORY:   Family History   Problem Relation Name Age of Onset    Hypertension Mother      Diabetes Father      Heart disease Father      Cancer Sister      Cancer Brother      Diabetes Daughter      Asthma Daughter      Heart attack Daughter      Diabetes Maternal Grandfather      Heart disease Paternal Grandmother      Diabetes Other      Hypertension Other      COPD Other      Other (chronic lung disease) Other       SOCIAL HISTORY:   Social History     Tobacco Use    Smoking status: Every Day     Current packs/day: 0.50     Average packs/day: 0.5 packs/day for 56.0 years (28.0 ttl pk-yrs)     Types: Cigarettes     Start date: 1969     Passive exposure: Never    Smokeless tobacco: Never   Vaping Use    Vaping status: Never Used   Substance Use Topics    Alcohol use: Not Currently    Drug use: Not Currently     Types: Marijuana     MEDICATIONS:  [START ON 2025] amiodarone, 200 mg, oral, Daily with breakfast  [START ON 2025] amLODIPine, 5 mg, oral, Daily  [Held by provider] aspirin, 81 mg, oral, Daily  [START ON 2025] atorvastatin, 40 mg, oral, Daily  [Held by provider] clopidogrel, 75 mg, oral, Daily  heparin, 2,000 Units, intra-catheter, After Dialysis  heparin, 2,000 Units, intra-catheter, After Dialysis  [START ON 2025] levothyroxine, 200 mcg, oral, Daily before breakfast  [START ON 2025] levothyroxine, 50 mcg, oral, Daily before breakfast  metoprolol succinate XL, 50 mg, oral, BID  pantoprazole, 40 mg, oral, BID  [START ON 2025] venlafaxine XR, 150 mg, oral, Daily           PRN medications: acetaminophen **OR** acetaminophen **OR**  acetaminophen, albuterol, benzocaine-menthol, bisacodyl, dextromethorphan-guaifenesin, dextrose, dextrose, glucagon, glucagon, guaiFENesin, prochlorperazine **OR** prochlorperazine **OR** prochlorperazine   CURRENT ALLERGIES:   Allergies as of 01/09/2025 - Reviewed 01/09/2025   Allergen Reaction Noted    Bee venom protein (honey bee) Swelling 10/03/2023    Citalopram Hives, Other, Rash, and Nausea/vomiting 09/21/2021    Codeine Headache, Other, and Itching 09/21/2021    Influenza virus vaccines Hives 04/27/2024    Latex Other 09/21/2021    Latex, natural rubber Other 09/21/2021    Lisinopril Swelling and Nausea/vomiting 09/21/2021    Penicillins Swelling, Headache, and Unknown 09/21/2021    Adhesive tape-silicones Other 09/21/2021    Amoxicillin Swelling and Rash 09/21/2021    Doxycycline Rash and Unknown 09/21/2021    Oseltamivir Rash and Nausea/vomiting 07/30/2023    Phenyleph-min oil-petrolatum Other 04/27/2024    Phenyleph-shark oil-glyc-pet Rash 10/03/2023    Phenylephrine Nausea/vomiting 03/21/2024    Prednisone Other and Nausea/vomiting 09/21/2021    Tuberculin ppd Unknown 03/21/2024    Xylometazoline Unknown 01/31/2024       COMPLETE REVIEW OF SYSTEMS:    Full ROS was negative unless mentioned above    OBJECTIVE  PHYSICAL EXAM  Heart Rate:  [60-68]   Temp:  [36.4 °C (97.5 °F)-36.5 °C (97.7 °F)]   Resp:  [12-26]   BP: (100-165)/(55-88)   SpO2:  [90 %-97 %]    There is no height or weight on file to calculate BMI.  This is a chronically ill-appearing woman, seems much older than her known age  Very pale skin  Hearing intact  Nonverbal with me today  Somewhat dry oral mucosa  Normal S1/normal S2  Lungs are clear anteriorly, increased work of breathing  Abdomen is soft, nondistended, nontender, positive bowel sounds  No Fay catheter in place, no suprapubic tenderness to palpation  Trace bilateral lower extremity edema  She has a right upper extremity graft that has no discernible bruit or thrill, her right  forearm is under extensive bandage  Moves 4 limbs spontaneously  No obvious joint deformities  No lymphadenopathy    DATA:   Labs:  Results for orders placed or performed during the hospital encounter of 01/09/25 (from the past 96 hours)   CBC and Auto Differential   Result Value Ref Range    WBC 11.9 (H) 4.4 - 11.3 x10*3/uL    nRBC 0.0 0.0 - 0.0 /100 WBCs    RBC 3.40 (L) 4.00 - 5.20 x10*6/uL    Hemoglobin 10.3 (L) 12.0 - 16.0 g/dL    Hematocrit 32.5 (L) 36.0 - 46.0 %    MCV 96 80 - 100 fL    MCH 30.3 26.0 - 34.0 pg    MCHC 31.7 (L) 32.0 - 36.0 g/dL    RDW 13.4 11.5 - 14.5 %    Platelets 179 150 - 450 x10*3/uL    Neutrophils % 87.5 40.0 - 80.0 %    Immature Granulocytes %, Automated 0.7 0.0 - 0.9 %    Lymphocytes % 3.6 13.0 - 44.0 %    Monocytes % 6.5 2.0 - 10.0 %    Eosinophils % 1.5 0.0 - 6.0 %    Basophils % 0.2 0.0 - 2.0 %    Neutrophils Absolute 10.38 (H) 1.20 - 7.70 x10*3/uL    Immature Granulocytes Absolute, Automated 0.08 0.00 - 0.70 x10*3/uL    Lymphocytes Absolute 0.43 (L) 1.20 - 4.80 x10*3/uL    Monocytes Absolute 0.77 0.10 - 1.00 x10*3/uL    Eosinophils Absolute 0.18 0.00 - 0.70 x10*3/uL    Basophils Absolute 0.02 0.00 - 0.10 x10*3/uL   Comprehensive metabolic panel   Result Value Ref Range    Glucose 92 74 - 99 mg/dL    Sodium 126 (L) 136 - 145 mmol/L    Potassium 4.3 3.5 - 5.3 mmol/L    Chloride 91 (L) 98 - 107 mmol/L    Bicarbonate 27 21 - 32 mmol/L    Anion Gap 12 10 - 20 mmol/L    Urea Nitrogen 41 (H) 6 - 23 mg/dL    Creatinine 3.57 (H) 0.50 - 1.05 mg/dL    eGFR 13 (L) >60 mL/min/1.73m*2    Calcium 7.6 (L) 8.6 - 10.3 mg/dL    Albumin 2.9 (L) 3.4 - 5.0 g/dL    Alkaline Phosphatase 87 33 - 136 U/L    Total Protein 5.6 (L) 6.4 - 8.2 g/dL    AST 10 9 - 39 U/L    Bilirubin, Total 0.6 0.0 - 1.2 mg/dL    ALT 7 7 - 45 U/L   CBC   Result Value Ref Range    WBC 10.6 4.4 - 11.3 x10*3/uL    nRBC 0.0 0.0 - 0.0 /100 WBCs    RBC 3.47 (L) 4.00 - 5.20 x10*6/uL    Hemoglobin 10.5 (L) 12.0 - 16.0 g/dL    Hematocrit  34.2 (L) 36.0 - 46.0 %    MCV 99 80 - 100 fL    MCH 30.3 26.0 - 34.0 pg    MCHC 30.7 (L) 32.0 - 36.0 g/dL    RDW 13.2 11.5 - 14.5 %    Platelets 188 150 - 450 x10*3/uL   Basic metabolic panel   Result Value Ref Range    Glucose 35 (LL) 74 - 99 mg/dL    Sodium 127 (L) 136 - 145 mmol/L    Potassium 4.2 3.5 - 5.3 mmol/L    Chloride 91 (L) 98 - 107 mmol/L    Bicarbonate 23 21 - 32 mmol/L    Anion Gap 17 10 - 20 mmol/L    Urea Nitrogen 48 (H) 6 - 23 mg/dL    Creatinine 3.83 (H) 0.50 - 1.05 mg/dL    eGFR 12 (L) >60 mL/min/1.73m*2    Calcium 7.6 (L) 8.6 - 10.3 mg/dL   POCT GLUCOSE   Result Value Ref Range    POCT Glucose 27 (L) 74 - 99 mg/dL   POCT GLUCOSE   Result Value Ref Range    POCT Glucose 200 (H) 74 - 99 mg/dL   POCT GLUCOSE   Result Value Ref Range    POCT Glucose 152 (H) 74 - 99 mg/dL     *Note: Due to a large number of results and/or encounters for the requested time period, some results have not been displayed. A complete set of results can be found in Results Review.       SIGNATURE: Joseph Mitchell MD PATIENT NAME: Daphne Morris   DATE: January 10, 2025 MRN: [id number]   TIME: 12:34 PM PAGER: [phone]

## 2025-01-10 NOTE — PRE-PROCEDURE NOTE
Report from Sending RN:    Report From: BEATRICE Gallagher  Recent Surgery of Procedure: Yes, temp catheter placement, okay to use verified  Baseline Level of Consciousness (LOC): A&Ox4  Oxygen Use: Yes  Type: bipap  Diabetic: Yes  Last BP Med Given Day of Dialysis: see EMAR  Last Pain Med Given: see EMAR  Lab Tests to be Obtained with Dialysis: No  Blood Transfusion to be Given During Dialysis: No  Available IV Access: Yes  Medications to be Administered During Dialysis: No  Continuous IV Infusion Running: Yes  Restraints on Currently or in the Last 24 Hours: No  Hand-Off Communication: stable and ready for bedside HD  Dialysis Catheter Dressing: clean, dry and intact  Last Dressing Change: today at insertion

## 2025-01-10 NOTE — NURSING NOTE
08:30 called and got report from the transfer center that patient has a bed at [facility] operated by [facility] room 435 .  time 9:45-10am     08:32 Called and gave report to Georgina BARRON at [facility].     08:40 Critical BLG of 35 reported from lab. POCT 27. MD notified. See MAR. See flowsheet for repeat POCT.

## 2025-01-10 NOTE — CARE PLAN
Problem: Skin  Goal: Decreased wound size/increased tissue granulation at next dressing change  1/10/2025 1604 by Loulou Gallagher RN  Outcome: Progressing  1/10/2025 1603 by Loulou Gallagher RN  Outcome: Progressing  Flowsheets (Taken 1/10/2025 1603)  Decreased wound size/increased tissue granulation at next dressing change:   Promote sleep for wound healing   Protective dressings over bony prominences  Goal: Participates in plan/prevention/treatment measures  1/10/2025 1604 by Loluou Gallagher RN  Outcome: Progressing  1/10/2025 1603 by Loulou Gallagher RN  Outcome: Progressing  Flowsheets (Taken 1/10/2025 1603)  Participates in plan/prevention/treatment measures: Elevate heels  Goal: Prevent/manage excess moisture  1/10/2025 1604 by Loulou Gallagher RN  Outcome: Progressing  1/10/2025 1603 by Loulou Gallagher RN  Outcome: Progressing  Flowsheets (Taken 1/10/2025 1603)  Prevent/manage excess moisture:   Cleanse incontinence/protect with barrier cream   Moisturize dry skin  Goal: Prevent/minimize sheer/friction injuries  1/10/2025 1604 by Loulou Gallagher RN  Outcome: Progressing  1/10/2025 1603 by Loulou Gallagher RN  Outcome: Progressing  Flowsheets (Taken 1/10/2025 1603)  Prevent/minimize sheer/friction injuries:   Turn/reposition every 2 hours/use positioning/transfer devices   Use pull sheet   HOB 30 degrees or less  Goal: Promote/optimize nutrition  1/10/2025 1604 by Loulou Gallagher RN  Outcome: Progressing  1/10/2025 1603 by Loulou Gallagher RN  Outcome: Progressing  Flowsheets (Taken 1/10/2025 1603)  Promote/optimize nutrition: Monitor/record intake including meals  Goal: Promote skin healing  1/10/2025 1604 by Loulou Gallagher RN  Outcome: Progressing  1/10/2025 1603 by Loulou Gallagher RN  Outcome: Progressing  Flowsheets (Taken 1/10/2025 1603)  Promote skin healing:   Protective dressings over bony prominences   Turn/reposition every 2 hours/use positioning/transfer devices     Problem: Dialysis  Goal: I will  slow progression of end-stage renal disease through participating in dialysis 3 times a week  Outcome: Progressing   The patient's goals for the shift include      The clinical goals for the shift include HD stable    Over the shift, the patient did not make progress toward the following goals. Barriers to progression include no dialysis access. Recommendations to address these barriers include shantal Rogers

## 2025-01-11 ENCOUNTER — APPOINTMENT (OUTPATIENT)
Dept: DIALYSIS | Facility: HOSPITAL | Age: 70
End: 2025-01-11
Payer: MEDICARE

## 2025-01-11 ENCOUNTER — APPOINTMENT (OUTPATIENT)
Dept: RADIOLOGY | Facility: HOSPITAL | Age: 70
DRG: 252 | End: 2025-01-11
Payer: MEDICARE

## 2025-01-11 LAB
ALBUMIN SERPL BCP-MCNC: 2.7 G/DL (ref 3.4–5)
ANION GAP BLDV CALCULATED.4IONS-SCNC: 6 MMOL/L (ref 10–25)
ANION GAP SERPL CALCULATED.3IONS-SCNC: 10 MMOL/L (ref 10–20)
APTT PPP: 29.8 SECONDS (ref 22–32.5)
APTT PPP: 60.2 SECONDS (ref 22–32.5)
BASE EXCESS BLDV CALC-SCNC: 4.3 MMOL/L (ref -2–3)
BODY TEMPERATURE: 37 DEGREES CELSIUS
BUN SERPL-MCNC: 19 MG/DL (ref 6–23)
CA-I BLD-SCNC: 1.06 MMOL/L (ref 1.1–1.33)
CA-I BLDV-SCNC: 1.13 MMOL/L (ref 1.1–1.33)
CALCIUM SERPL-MCNC: 7.4 MG/DL (ref 8.6–10.3)
CHLORIDE BLDV-SCNC: 97 MMOL/L (ref 98–107)
CHLORIDE SERPL-SCNC: 98 MMOL/L (ref 98–107)
CO2 SERPL-SCNC: 31 MMOL/L (ref 21–32)
CREAT SERPL-MCNC: 2.25 MG/DL (ref 0.5–1.05)
EGFRCR SERPLBLD CKD-EPI 2021: 23 ML/MIN/1.73M*2
EPAP CMH2O: 8 CM H2O
ERYTHROCYTE [DISTWIDTH] IN BLOOD BY AUTOMATED COUNT: 13.5 % (ref 11.5–14.5)
GLUCOSE BLD MANUAL STRIP-MCNC: 108 MG/DL (ref 74–99)
GLUCOSE BLD MANUAL STRIP-MCNC: 141 MG/DL (ref 74–99)
GLUCOSE BLD MANUAL STRIP-MCNC: 145 MG/DL (ref 74–99)
GLUCOSE BLD MANUAL STRIP-MCNC: 71 MG/DL (ref 74–99)
GLUCOSE BLD MANUAL STRIP-MCNC: 79 MG/DL (ref 74–99)
GLUCOSE BLD MANUAL STRIP-MCNC: 87 MG/DL (ref 74–99)
GLUCOSE BLD MANUAL STRIP-MCNC: 99 MG/DL (ref 74–99)
GLUCOSE BLDV-MCNC: 72 MG/DL (ref 74–99)
GLUCOSE SERPL-MCNC: 71 MG/DL (ref 74–99)
HCO3 BLDV-SCNC: 30.9 MMOL/L (ref 22–26)
HCT VFR BLD AUTO: 30.3 % (ref 36–46)
HCT VFR BLD EST: 30 % (ref 36–46)
HGB BLD-MCNC: 9.7 G/DL (ref 12–16)
HGB BLDV-MCNC: 10.1 G/DL (ref 12–16)
INHALED O2 CONCENTRATION: 30 %
INSPIRATORY TIME: 0.9
IPAP CMH2O: 20 CM H2O
LACTATE BLDV-SCNC: 0.6 MMOL/L (ref 0.4–2)
MAGNESIUM SERPL-MCNC: 1.74 MG/DL (ref 1.6–2.4)
MCH RBC QN AUTO: 31.4 PG (ref 26–34)
MCHC RBC AUTO-ENTMCNC: 32 G/DL (ref 32–36)
MCV RBC AUTO: 98 FL (ref 80–100)
NRBC BLD-RTO: 0 /100 WBCS (ref 0–0)
OXYHGB MFR BLDV: 78.7 % (ref 45–75)
PCO2 BLDV: 56 MM HG (ref 41–51)
PH BLDV: 7.35 PH (ref 7.33–7.43)
PHOSPHATE SERPL-MCNC: 1.9 MG/DL (ref 2.5–4.9)
PLATELET # BLD AUTO: 178 X10*3/UL (ref 150–450)
PO2 BLDV: 46 MM HG (ref 35–45)
POTASSIUM BLDV-SCNC: 3.9 MMOL/L (ref 3.5–5.3)
POTASSIUM SERPL-SCNC: 3.8 MMOL/L (ref 3.5–5.3)
RBC # BLD AUTO: 3.09 X10*6/UL (ref 4–5.2)
SAO2 % BLDV: 80 % (ref 45–75)
SODIUM BLDV-SCNC: 130 MMOL/L (ref 136–145)
SODIUM SERPL-SCNC: 135 MMOL/L (ref 136–145)
VENTILATOR MODE: ABNORMAL
VENTILATOR RATE: 16 BPM
WBC # BLD AUTO: 11.8 X10*3/UL (ref 4.4–11.3)

## 2025-01-11 PROCEDURE — 36415 COLL VENOUS BLD VENIPUNCTURE: CPT

## 2025-01-11 PROCEDURE — 2500000001 HC RX 250 WO HCPCS SELF ADMINISTERED DRUGS (ALT 637 FOR MEDICARE OP)

## 2025-01-11 PROCEDURE — 94667 MNPJ CHEST WALL 1ST: CPT

## 2025-01-11 PROCEDURE — 94640 AIRWAY INHALATION TREATMENT: CPT

## 2025-01-11 PROCEDURE — 82330 ASSAY OF CALCIUM: CPT

## 2025-01-11 PROCEDURE — 97161 PT EVAL LOW COMPLEX 20 MIN: CPT | Mod: GP

## 2025-01-11 PROCEDURE — 85730 THROMBOPLASTIN TIME PARTIAL: CPT

## 2025-01-11 PROCEDURE — 2500000001 HC RX 250 WO HCPCS SELF ADMINISTERED DRUGS (ALT 637 FOR MEDICARE OP): Performed by: STUDENT IN AN ORGANIZED HEALTH CARE EDUCATION/TRAINING PROGRAM

## 2025-01-11 PROCEDURE — 2500000004 HC RX 250 GENERAL PHARMACY W/ HCPCS (ALT 636 FOR OP/ED)

## 2025-01-11 PROCEDURE — 82947 ASSAY GLUCOSE BLOOD QUANT: CPT

## 2025-01-11 PROCEDURE — 99291 CRITICAL CARE FIRST HOUR: CPT

## 2025-01-11 PROCEDURE — 99233 SBSQ HOSP IP/OBS HIGH 50: CPT | Performed by: NURSE PRACTITIONER

## 2025-01-11 PROCEDURE — 71045 X-RAY EXAM CHEST 1 VIEW: CPT | Performed by: RADIOLOGY

## 2025-01-11 PROCEDURE — 97530 THERAPEUTIC ACTIVITIES: CPT | Mod: GO

## 2025-01-11 PROCEDURE — 37799 UNLISTED PX VASCULAR SURGERY: CPT

## 2025-01-11 PROCEDURE — 84132 ASSAY OF SERUM POTASSIUM: CPT

## 2025-01-11 PROCEDURE — 71045 X-RAY EXAM CHEST 1 VIEW: CPT

## 2025-01-11 PROCEDURE — 85027 COMPLETE CBC AUTOMATED: CPT

## 2025-01-11 PROCEDURE — 83735 ASSAY OF MAGNESIUM: CPT

## 2025-01-11 PROCEDURE — 2500000005 HC RX 250 GENERAL PHARMACY W/O HCPCS: Performed by: SURGERY

## 2025-01-11 PROCEDURE — 94660 CPAP INITIATION&MGMT: CPT

## 2025-01-11 PROCEDURE — 2500000002 HC RX 250 W HCPCS SELF ADMINISTERED DRUGS (ALT 637 FOR MEDICARE OP, ALT 636 FOR OP/ED)

## 2025-01-11 PROCEDURE — 2500000005 HC RX 250 GENERAL PHARMACY W/O HCPCS

## 2025-01-11 PROCEDURE — 97167 OT EVAL HIGH COMPLEX 60 MIN: CPT | Mod: GO

## 2025-01-11 PROCEDURE — 1200000002 HC GENERAL ROOM WITH TELEMETRY DAILY

## 2025-01-11 PROCEDURE — 8010000001 HC DIALYSIS - HEMODIALYSIS PER DAY

## 2025-01-11 RX ORDER — SODIUM CHLORIDE FOR INHALATION 3 %
3 VIAL, NEBULIZER (ML) INHALATION EVERY 6 HOURS SCHEDULED
Status: DISCONTINUED | OUTPATIENT
Start: 2025-01-11 | End: 2025-01-12

## 2025-01-11 RX ORDER — HEPARIN SODIUM 5000 [USP'U]/ML
2000-4000 INJECTION, SOLUTION INTRAVENOUS; SUBCUTANEOUS AS NEEDED
Status: ACTIVE | OUTPATIENT
Start: 2025-01-11

## 2025-01-11 RX ORDER — ACETAMINOPHEN 500 MG
10 TABLET ORAL NIGHTLY PRN
Status: DISPENSED | OUTPATIENT
Start: 2025-01-11

## 2025-01-11 RX ORDER — HEPARIN SODIUM 10000 [USP'U]/100ML
0-4000 INJECTION, SOLUTION INTRAVENOUS CONTINUOUS
Status: DISPENSED | OUTPATIENT
Start: 2025-01-11

## 2025-01-11 RX ORDER — MAGNESIUM SULFATE 1 G/100ML
1 INJECTION INTRAVENOUS ONCE
Status: COMPLETED | OUTPATIENT
Start: 2025-01-11 | End: 2025-01-11

## 2025-01-11 RX ADMIN — Medication 4 L/MIN: at 20:10

## 2025-01-11 RX ADMIN — METOPROLOL SUCCINATE 50 MG: 50 TABLET, EXTENDED RELEASE ORAL at 21:12

## 2025-01-11 RX ADMIN — PANTOPRAZOLE SODIUM 40 MG: 40 INJECTION, POWDER, FOR SOLUTION INTRAVENOUS at 09:19

## 2025-01-11 RX ADMIN — IPRATROPIUM BROMIDE AND ALBUTEROL SULFATE 3 ML: 2.5; .5 SOLUTION RESPIRATORY (INHALATION) at 02:18

## 2025-01-11 RX ADMIN — LEVOTHYROXINE SODIUM 50 MCG: 0.05 TABLET ORAL at 09:36

## 2025-01-11 RX ADMIN — Medication 10 MG: at 21:13

## 2025-01-11 RX ADMIN — Medication 30 PERCENT: at 08:00

## 2025-01-11 RX ADMIN — HEPARIN SODIUM 2000 UNITS: 1000 INJECTION, SOLUTION INTRAVENOUS; SUBCUTANEOUS at 17:14

## 2025-01-11 RX ADMIN — Medication 30 PERCENT: at 07:27

## 2025-01-11 RX ADMIN — IPRATROPIUM BROMIDE AND ALBUTEROL SULFATE 3 ML: 2.5; .5 SOLUTION RESPIRATORY (INHALATION) at 11:42

## 2025-01-11 RX ADMIN — HEPARIN SODIUM 5000 UNITS: 5000 INJECTION, SOLUTION INTRAVENOUS; SUBCUTANEOUS at 05:41

## 2025-01-11 RX ADMIN — Medication 3 ML: at 11:42

## 2025-01-11 RX ADMIN — IPRATROPIUM BROMIDE AND ALBUTEROL SULFATE 3 ML: 2.5; .5 SOLUTION RESPIRATORY (INHALATION) at 20:11

## 2025-01-11 RX ADMIN — LEVOTHYROXINE SODIUM 200 MCG: 0.1 TABLET ORAL at 09:35

## 2025-01-11 RX ADMIN — DIBASIC SODIUM PHOSPHATE, MONOBASIC POTASSIUM PHOSPHATE AND MONOBASIC SODIUM PHOSPHATE 250 MG: 852; 155; 130 TABLET ORAL at 17:42

## 2025-01-11 RX ADMIN — IPRATROPIUM BROMIDE AND ALBUTEROL SULFATE 3 ML: 2.5; .5 SOLUTION RESPIRATORY (INHALATION) at 07:27

## 2025-01-11 RX ADMIN — ATORVASTATIN CALCIUM 40 MG: 40 TABLET, FILM COATED ORAL at 21:12

## 2025-01-11 RX ADMIN — AMLODIPINE BESYLATE 5 MG: 5 TABLET ORAL at 09:19

## 2025-01-11 RX ADMIN — DIBASIC SODIUM PHOSPHATE, MONOBASIC POTASSIUM PHOSPHATE AND MONOBASIC SODIUM PHOSPHATE 250 MG: 852; 155; 130 TABLET ORAL at 21:13

## 2025-01-11 RX ADMIN — HEPARIN SODIUM 800 UNITS/HR: 10000 INJECTION, SOLUTION INTRAVENOUS at 10:22

## 2025-01-11 RX ADMIN — HEPARIN SODIUM 2000 UNITS: 1000 INJECTION, SOLUTION INTRAVENOUS; SUBCUTANEOUS at 17:13

## 2025-01-11 RX ADMIN — VENLAFAXINE HYDROCHLORIDE 150 MG: 150 CAPSULE, EXTENDED RELEASE ORAL at 09:19

## 2025-01-11 RX ADMIN — Medication 36 PERCENT: at 11:43

## 2025-01-11 RX ADMIN — MAGNESIUM SULFATE IN DEXTROSE 1 G: 10 INJECTION, SOLUTION INTRAVENOUS at 09:20

## 2025-01-11 RX ADMIN — Medication 3 ML: at 20:10

## 2025-01-11 RX ADMIN — AMIODARONE HYDROCHLORIDE 200 MG: 200 TABLET ORAL at 09:18

## 2025-01-11 RX ADMIN — PANTOPRAZOLE SODIUM 40 MG: 40 INJECTION, POWDER, FOR SOLUTION INTRAVENOUS at 21:12

## 2025-01-11 RX ADMIN — METOPROLOL SUCCINATE 50 MG: 50 TABLET, EXTENDED RELEASE ORAL at 09:19

## 2025-01-11 ASSESSMENT — COGNITIVE AND FUNCTIONAL STATUS - GENERAL
DAILY ACTIVITIY SCORE: 11
STANDING UP FROM CHAIR USING ARMS: A LOT
HELP NEEDED FOR BATHING: A LOT
PERSONAL GROOMING: A LOT
EATING MEALS: A LITTLE
DRESSING REGULAR LOWER BODY CLOTHING: TOTAL
TOILETING: TOTAL
MOBILITY SCORE: 10
WALKING IN HOSPITAL ROOM: TOTAL
MOVING TO AND FROM BED TO CHAIR: A LOT
CLIMB 3 TO 5 STEPS WITH RAILING: TOTAL
DRESSING REGULAR UPPER BODY CLOTHING: A LOT
TURNING FROM BACK TO SIDE WHILE IN FLAT BAD: A LOT
MOVING FROM LYING ON BACK TO SITTING ON SIDE OF FLAT BED WITH BEDRAILS: A LOT

## 2025-01-11 ASSESSMENT — PAIN SCALES - GENERAL
PAINLEVEL_OUTOF10: 0 - NO PAIN

## 2025-01-11 ASSESSMENT — PAIN - FUNCTIONAL ASSESSMENT
PAIN_FUNCTIONAL_ASSESSMENT: 0-10
PAIN_FUNCTIONAL_ASSESSMENT: 0-10
PAIN_FUNCTIONAL_ASSESSMENT: NO/DENIES PAIN
PAIN_FUNCTIONAL_ASSESSMENT: 0-10

## 2025-01-11 ASSESSMENT — ACTIVITIES OF DAILY LIVING (ADL)
ADL_ASSISTANCE: INDEPENDENT
BATHING_ASSISTANCE: MAXIMAL
ADL_ASSISTANCE: INDEPENDENT

## 2025-01-11 NOTE — PROGRESS NOTES
[facility] Critical Care Medicine       Date:  1/11/2025  Patient:  Daphne Morris  YOB: 1955  MRN:  [id number]   Admit Date:  1/10/2025      No chief complaint on file.        History of Present Illness:  Daphne Morris is a 69 y.o. year old female patient with Past Medical History of  ESRD, anemia, afib, chf, copd, CVA, depression dm, chronic hypoxic respiratory failure on 3L O2 at home, gerd hyperlipidemia, hypertension, hypothyroid, renal cell carcinoma s/p resection in 8/21 and vascultitis. Patient was admitted 1/9 at [facility] for clotted AV fistula and transferred to [facility] to be seen by vascular surgery. Patient on arrival was lethargic and not responding. ABG was ordered showing hypercapnic respiratory acidosis with hypoxemia, patient was placed on bipap and transferred to ICU. Labs on admission also concerning for hyponatremia, hypoglycemia.      Patient had recent admission starting 12/20 for fall with discharge home same day. 12/26-12/28 came in for syncope at dialysis. Carotid duplex us and echo were performed with no etiology established as cause of syncopal event. Patient was noted for right sided pleural effusion that was planned to be drained the following week. 12/29 patient arrived to Ed with complaints of SOB after having difficult with her home O2. Patient discharged that day. 12/31 patient to ED post fall with concern for downtime around 16 hours and with altered mental status. Patient on admission also test positive for flu A and hypoglycemic. She was eventually discharged on 1/7 with discontinuation of lantus insulin and diagnosis of encephalopathy related to chronic hypoxic respiratory failure 2/2 to right sided pleural effusion that was drained during stay.    Interval ICU Events:  1/10: Patient admitted to icu as rapid response in hypoxic hypercapnic respiratory failure requiring bipap. Patient was lined and HD run was performed.     1/11:    Medical History:  Past Medical  History:   Diagnosis Date    Anal fissure 10/12/2023    Anemia     ASHD (arteriosclerotic heart disease)     At risk for falls     Atrial fibrillation (Multi)     CHF (congestive heart failure)     CKD (chronic kidney disease)     COPD (chronic obstructive pulmonary disease) (Multi)     CVA (cerebral vascular accident) (Multi)     - right-sided weakness    Depression     Diabetes mellitus (Multi)     GERD (gastroesophageal reflux disease)     H/O pleural effusion     Hyperlipidemia     Hypertension     Hypothyroidism     Hypoxia     Impacted cerumen, left ear     Impacted cerumen of left ear    Noncompliance     Osteoarthritis     Perianal dermatitis 10/12/2023    Personal history of other diseases of the respiratory system     History of acute bronchitis    PVD (peripheral vascular disease) (CMS-HCC)     Renal carcinoma, right (Multi)     s/p partial nephrectomy 2021    Respiratory failure (Multi)     TIA (transient ischemic attack)     Vasculitis (CMS-HCC) 10/12/2023    Weakness      Past Surgical History:   Procedure Laterality Date    ANKLE SURGERY          CARDIAC CATHETERIZATION N/A 2024    Procedure: Right Heart Cath;  Surgeon: Nicholas Antonio MD;  Location: [facility] Cardiac Cath Lab;  Service: Cardiovascular;  Laterality: N/A;    CATARACT EXTRACTION Right     2019     SECTION, CLASSIC      MR CHEST ANGIO W AND WO IV CONTRAST  2023    MR CHEST ANGIO W AND WO IV CONTRAST 2023 [facility]    MR HEAD ANGIO WO IV CONTRAST  2021    MR HEAD ANGIO WO IV CONTRAST [facility]    NEPHRECTOMY  2021    SHOULDER SURGERY      2009     Medications Prior to Admission   Medication Sig Dispense Refill Last Dose/Taking    acetaminophen (Tylenol) 500 mg tablet Take 2 tablets (1,000 mg) by mouth 2 times a day.       albuterol 90 mcg/actuation inhaler INHALE 2 PUFFS BY MOUTH EVERY 4 HOURS AS NEEDED 8.5 g 0     amiodarone (Pacerone) 200 mg tablet TAKE 1 TABLET BY MOUTH  "ONCE DAILY WITH BREAKFAST. 90 tablet 0     amLODIPine (Norvasc) 5 mg tablet Take 1 tablet (5 mg) by mouth once daily.       aspirin 81 mg EC tablet Take 1 tablet (81 mg) by mouth once daily.       atorvastatin (Lipitor) 40 mg tablet TAKE ONE TABLET BY MOUTH EVERY DAY 90 tablet 3     BD Calista 2nd Gen Pen Needle 32 gauge x 5/32\" needle USE SUBCUTANEOUSLY AS DIRECTED TWICE DAILY 200 each 2     clopidogrel (Plavix) 75 mg tablet Take 1 tablet (75 mg) by mouth once daily.       ferrous gluconate 324 (38 Fe) mg tablet Take 1 tablet (324 mg) by mouth once daily with breakfast. 30 tablet 3     FreeStyle Shakir 14 Day Sensor kit USE AS DIRECTED TO CHECK SUGARS 3 TO 4 TIMES DAILY 1 each 11     FreeStyle Shakir 2 [place] misc Use as instructed 1 each 0     FreeStyle Shakir 3 [place] misc Use as instructed 1 each 0     FreeStyle Shakir 3 Sensor device Change sensor Q 14 days 2 each 11     HumaLOG KwikPen Insulin 100 unit/mL injection up to 15 units Subcutaneous three times a day per sliding scaleup to 15 units Subcutaneous three times a day per sliding scale (Patient taking differently: up to 15 units Subcutaneous three times a day per sliding scale.      151-200 2 units  201-250 4 units  251-300 6 units  301-350 8 units  351-400 10 units) 5 each 3     ipratropium-albuteroL (Duo-Neb) 0.5-2.5 mg/3 mL nebulizer solution INHALE THE CONTENTS OF 1 VIAL VIA NEBULIZER EVERY 6 HOURS AS NEEDED. 360 mL 0     levothyroxine (Synthroid, Levoxyl) 200 mcg tablet Take 1 tablet by mouth once daily in the morning before a meal. 90 tablet 3     levothyroxine (Synthroid, Levoxyl) 50 mcg tablet Take 1 tablet (50 mcg) by mouth once daily in the morning. Take before meals. 90 tablet 3     metoprolol succinate XL (Toprol-XL) 50 mg 24 hr tablet TAKE ONE TABLET BY MOUTH TWO TIMES A  tablet 0     oxygen DME/Hospice (O2) therapy Inhale 3 L/min once daily at bedtime. Indications: decreased oxygen in the tissues or blood       pantoprazole (ProtoNix) 40 mg " EC tablet TAKE ONE TABLET BY MOUTH TWO TIMES A DAY 60 tablet 0     venlafaxine XR (Effexor-XR) 75 mg 24 hr capsule Take 2 capsules (150 mg) by mouth once daily. 180 capsule 3      Bee venom protein (honey bee); Citalopram; Codeine; Influenza virus vaccines; Latex; Latex, natural rubber; Lisinopril; Penicillins; Adhesive tape-silicones; Amoxicillin; Doxycycline; Oseltamivir; Phenyleph-min oil-petrolatum; Phenyleph-shark oil-glyc-pet; Phenylephrine; Prednisone; Tuberculin ppd; and Xylometazoline  Social History     Tobacco Use    Smoking status: Every Day     Current packs/day: 0.50     Average packs/day: 0.5 packs/day for 56.0 years (28.0 ttl pk-yrs)     Types: Cigarettes     Start date: 1/1/1969     Passive exposure: Never    Smokeless tobacco: Never   Vaping Use    Vaping status: Never Used   Substance Use Topics    Alcohol use: Not Currently    Drug use: Not Currently     Types: Marijuana     Family History   Problem Relation Name Age of Onset    Hypertension Mother      Diabetes Father      Heart disease Father      Cancer Sister      Cancer Brother      Diabetes Daughter      Asthma Daughter      Heart attack Daughter      Diabetes Maternal Grandfather      Heart disease Paternal Grandmother      Diabetes Other      Hypertension Other      COPD Other      Other (chronic lung disease) Other         Hospital Medications:           Current Facility-Administered Medications:     acetaminophen (Tylenol) tablet 650 mg, 650 mg, oral, q4h PRN **OR** acetaminophen (Tylenol) oral liquid 650 mg, 650 mg, oral, q4h PRN **OR** acetaminophen (Tylenol) suppository 650 mg, 650 mg, rectal, q4h PRN, Tyrone Ramesh APRN-CNP    albuterol 2.5 mg /3 mL (0.083 %) nebulizer solution 2.5 mg, 2.5 mg, nebulization, q6h PRN, Tyrone Ramesh APRN-CNP    amiodarone (Pacerone) tablet 200 mg, 200 mg, oral, Daily with breakfast, CARLENE Ngo-CNP    amLODIPine (Norvasc) tablet 5 mg, 5 mg, oral, Daily, CARLENE Ngo-FELICIA    [Held by  provider] aspirin EC tablet 81 mg, 81 mg, oral, Daily, Jackelyn Kaminski DO    atorvastatin (Lipitor) tablet 40 mg, 40 mg, oral, Nightly, Tyrone Ramesh, APRN-CNP    benzocaine-menthol (Cepastat Sore Throat) lozenge 1 lozenge, 1 lozenge, Mouth/Throat, q2h PRN, Tyrone Ramesh, APRN-CNP    bisacodyl (Dulcolax) EC tablet 10 mg, 10 mg, oral, Daily PRN, Tyrone Ramesh, APRN-CNP    [Held by provider] clopidogrel (Plavix) tablet 75 mg, 75 mg, oral, Daily, Jackelyn Kaminski DO    dextromethorphan-guaifenesin (Robitussin DM)  mg/5 mL oral liquid 5 mL, 5 mL, oral, q4h PRN, Tyrone Ramesh, APRN-CNP    dextrose 50 % injection 12.5 g, 12.5 g, intravenous, q15 min PRN, Tyrone Ramesh, APRN-CNP, 12.5 g at 01/10/25 2000    dextrose 50 % injection 25 g, 25 g, intravenous, q15 min PRN, Tyrone Ramesh, APRN-CNP    epoetin debra-epbx (Retacrit) injection 5,000 Units, 5,000 Units, subcutaneous, Once per day on Monday Wednesday Friday, Tyrone Ramesh, APRN-CNP, 5,000 Units at 01/10/25 1816    glucagon (Glucagen) injection 1 mg, 1 mg, intramuscular, q15 min PRN, Tyrone Ramesh, APRN-CNP    glucagon (Glucagen) injection 1 mg, 1 mg, intramuscular, q15 min PRN, Tyrone Ramesh, APRN-CNP    guaiFENesin (Mucinex) 12 hr tablet 600 mg, 600 mg, oral, q12h PRN, Tyrone AG Kiss, APRN-CNP    heparin (porcine) injection 5,000 Units, 5,000 Units, subcutaneous, q8h SUMIT, Tyrone Ramesh, APRN-CNP, 5,000 Units at 01/11/25 0541    heparin 1,000 unit/mL injection 2,000 Units, 2,000 Units, intra-catheter, After Dialysis, Tyrone Ramesh APRN-CNP, 1,200 Units at 01/10/25 1902    heparin 1,000 unit/mL injection 2,000 Units, 2,000 Units, intra-catheter, After Dialysis, GREGORIA Ngo, 1,200 Units at 01/10/25 1901    ipratropium-albuteroL (Duo-Neb) 0.5-2.5 mg/3 mL nebulizer solution 3 mL, 3 mL, nebulization, q6h, GREGORIA Ngo, 3 mL at 01/11/25 0727    levothyroxine (Synthroid) injection 125 mcg, 125 mcg, intravenous, q24h [facility],  GREGORIA Henriquez    [Held by provider] levothyroxine (Synthroid, Levoxyl) tablet 200 mcg, 200 mcg, oral, Daily, GREGORIA Ngo    [Held by provider] levothyroxine (Synthroid, Levoxyl) tablet 50 mcg, 50 mcg, oral, Daily, GREGORIA Ngo    magnesium sulfate 1 g in dextrose 5%  mL, 1 g, intravenous, Once, GREGORIA Henriquez    metoprolol succinate XL (Toprol-XL) 24 hr tablet 50 mg, 50 mg, oral, BID, GREGORIA Ngo    oxygen (O2) therapy, , inhalation, Continuous - Inhalation, GREGORIA Ngo, 30 percent at 01/11/25 0727    oxygen (O2) therapy, , inhalation, Continuous - Inhalation, GREGORIA Ngo    [Held by provider] pantoprazole (ProtoNix) EC tablet 40 mg, 40 mg, oral, BID, GREGORIA Ngo    pantoprazole (ProtoNix) injection 40 mg, 40 mg, intravenous, BID, GREGORIA Henriquez, 40 mg at 01/10/25 2200    polyethylene glycol (Glycolax, Miralax) packet 17 g, 17 g, oral, Daily PRN, GREGORIA Ngo    prochlorperazine (Compazine) tablet 10 mg, 10 mg, oral, q6h PRN **OR** prochlorperazine (Compazine) injection 10 mg, 10 mg, intravenous, q6h PRN **OR** prochlorperazine (Compazine) suppository 25 mg, 25 mg, rectal, q12h PRN, GREGORIA Ngo    venlafaxine XR (Effexor-XR) 24 hr capsule 150 mg, 150 mg, oral, Daily, GREGORIA Ngo    Review of Systems:  14 point review of systems was completed and negative except for those specially mention in my HPI    Physical Exam:    Heart Rate:  [54-72]   Temp:  [36.1 °C (97 °F)-36.7 °C (98.1 °F)]   Resp:  [14-26]   BP: (106-158)/(53-97)   Weight:  [70 kg (154 lb 5.2 oz)]   SpO2:  [93 %-100 %]     Physical Exam  HENT:      Mouth/Throat:      Mouth: Mucous membranes are moist.      Pharynx: Oropharynx is clear.   Eyes:      Pupils: Pupils are equal, round, and reactive to light.   Cardiovascular:      Rate and Rhythm: Normal rate and regular rhythm.      Pulses: Normal  pulses.   Pulmonary:      Effort: Pulmonary effort is normal.      Breath sounds: Examination of the right-lower field reveals rhonchi. Examination of the left-lower field reveals rhonchi. Rhonchi present.      Comments: Wet cough on assessment, non productive  Abdominal:      General: Abdomen is flat.      Palpations: Abdomen is soft.   Musculoskeletal:         General: Normal range of motion.      Cervical back: Normal range of motion.   Skin:     General: Skin is warm and dry.      Capillary Refill: Capillary refill takes less than 2 seconds.   Neurological:      General: No focal deficit present.      Mental Status: She is alert and oriented to person, place, and time. Mental status is at baseline.         Objective:    I have reviewed all medications, laboratory results, and imaging pertinent for today's encounter.    FiO2 (%):  [30 %-40 %] 30 %  S RR:  [16] 16      Intake/Output Summary (Last 24 hours) at 1/11/2025 0815  Last data filed at 1/10/2025 2011  Gross per 24 hour   Intake 1200 ml   Output 3406 ml   Net -2206 ml         Assessment/Plan:    I am currently managing this critically ill patient for the following problems:    Plan:  Neuro/Psych/Pain Ctrl/Sedation:  Acute hypoxic/hypercapnic encephalopathy  Hx of depression, cva  Ct head 1/10: no acute intracranial pathology  - CAM ICU score q-shift  - Sleep/wake cycle hygiene  - Delirium precaution   - Pain Management: tylenol prn  - Q4 Neuro assessments  - Continue Effexor     Respiratory/ENT:  Acute on chronic hypercapnic, hypoxemic respiratory failure  COPD  Right sided pleural effusion  CXR 1/9: vascular congestion, bilateral pleural effusions  Admitted to ICU on Bipap 20/8 40%, wean as appropriate  R sided thoracentesis with 1L removal on 12/31  - Continuous Pulse oximetry  - Wean Fio2 to maintain Spo2>92%  - Bronchial hygiene and IS  - Duonebs q6  - Will trial NC today  - Bipap at night     Cardiovascular:  Thrombosis of AF fistula  Hx of afib,  hypertension, hyperlipid, HFpEF, vasculitis   Echo 12/24: EF of 55-60%, elevated left atrial pressure, severly increased septal and posterior left ventricular wall thickness, severely enlarge LV hypertrophy  Carotid artery duplex 12/24: Right and Left carotid with less than 50% occlusion, greater 50% stenosis in left subclavian artery  - Continuous Telemetry  - Systolic goals >90 and < 160  - Vascular surgery consulted  - IR consulted for fistula graft declot  - Continue home amlodipine lipitor, metoprolol, amiodarone, asa  - Holding asa and plavix while awaiting ir procedure  - Heparin gtt for thrombosis  - Holding plavix while awaiting IR procedure     Renal/Volume Status (Intra & Extravascular):  ESRD  - Maintain urine output 0.5-1.0cc/kg/hr  - Monitor I/O's  - Replete electrolytes to maintain K >4.0 and Mg >2.0  - Daily BMP, Mg   - Nephrology consulted  - HD 1/10 with -3L, will get rerun 1/11 in setting of reoccurring pleural effusion and instability after missing HD earlier this week     GI:  Hx of GERD  - Diet: NPO with sips with meds while requiring bipap  - PPI: home protonix  - BR: miralax     Infectious Disease:  Positive flu A on 12/31  No infectious disease concerns at this time  - Pleural fluid tap negative  - Daily CBC     Heme/Onc:  Chronic anemia  Hx of renal cell carcinoma s/p resection '21  - DVT Prophylaxis: SCDs and heparin  - Transfuse for hemoglobin < 7  - Continue epoetin injection     Endocrine:  Hypoglycemia, possible symptomatic  Hypothyroid  - POCT q4  - HGB A1C 8.1 (1/1/25)  - TSH 2.6  - Continue home synthroid     OBGYN/Ortho:  - PT/OT when stable     Ethics/Code Status:  DNR DNI  Consider palliative consult if no respiratory improvment     :  DVT Prophylaxis: SCDs and heparin  GI Prophylaxis: PPI home  Bowel Regimen: Miralax prn  Diet: NPO with sips with meds  CVC: RIJ trialysis  [place]: None  Fay: None  Restraints: none  Dispo: Downgrade to sdu after hd run    Critical  Care Time:  45 minutes spent in preparing to see patient (I.e. review of medical records), evaluation of diagnostics (I.e. labs, imaging, etc.), documentation, discussing plan of care with patient/ family/ caregiver, and/ or coordination of care with multidisciplinary team. Time does not include completion of procedure time.       Tyrone Ramesh, APRN-CNP

## 2025-01-11 NOTE — PRE-PROCEDURE NOTE
..Report from Sending RN:    Report From: Loulou Gallagher RN  Recent Surgery of Procedure: No  Baseline Level of Consciousness (LOC): A&Ox4  Oxygen Use: Yes  Type: 4L nasal cannula  Diabetic: No  Last BP Med Given Day of Dialysis: see MAR  Last Pain Med Given: see MAR  Lab Tests to be Obtained with Dialysis: No  Blood Transfusion to be Given During Dialysis: No  Available IV Access: Yes  Medications to be Administered During Dialysis: No  Continuous IV Infusion Running: Yes  Restraints on Currently or in the Last 24 Hours: No  Hand-Off Communication: pt stable for UF removal at bedside.  Dialysis Catheter Dressing: clean, dry, intact  Last Dressing Change: 1/10/25

## 2025-01-11 NOTE — PROGRESS NOTES
Daphne Morris is a 69 y.o. female on day 1 of admission presenting with Clotted renal dialysis AV graft (CMS-[facility]).      Subjective   Dialyzed yesterday and breathing better.  Stable BP.       Scheduled medications  amiodarone, 200 mg, oral, Daily with breakfast  amLODIPine, 5 mg, oral, Daily  aspirin, 81 mg, oral, Daily  atorvastatin, 40 mg, oral, Nightly  [Held by provider] clopidogrel, 75 mg, oral, Daily  epoetin debra or biosimilar, 5,000 Units, subcutaneous, Once per day on Monday Wednesday Friday  heparin, 2,000 Units, intra-catheter, After Dialysis  heparin, 2,000 Units, intra-catheter, After Dialysis  ipratropium-albuteroL, 3 mL, nebulization, q6h  levothyroxine, 200 mcg, oral, Daily  levothyroxine, 50 mcg, oral, Daily  metoprolol succinate XL, 50 mg, oral, BID  oxygen, , inhalation, Continuous - Inhalation  [Held by provider] pantoprazole, 40 mg, oral, BID  pantoprazole, 40 mg, intravenous, BID  sodium chloride, 3 mL, nebulization, q6h SUMIT  venlafaxine XR, 150 mg, oral, Daily      Continuous medications  heparin, 0-4,000 Units/hr, Last Rate: 800 Units/hr (01/11/25 1102)      PRN medications  PRN medications: acetaminophen **OR** acetaminophen **OR** acetaminophen, albuterol, benzocaine-menthol, bisacodyl, dextromethorphan-guaifenesin, dextrose, dextrose, glucagon, glucagon, guaiFENesin, heparin (porcine), polyethylene glycol, prochlorperazine **OR** prochlorperazine **OR** prochlorperazine      Objective     Vitals 24HR  Heart Rate:  [54-72]   Temp:  [36.1 °C (97 °F)-36.7 °C (98.1 °F)]   Resp:  [14-26]   BP: (106-158)/(54-97)   Weight:  [70 kg (154 lb 5.2 oz)]   SpO2:  [93 %-100 %]     General: Elderly woman, not in acute distress  Access: Right IJ trialysis catheter, RUE AVG with absent bruit  Lungs: Decreased BS bibasilarly  Heart: S1 and S2, regular  Abdomen: Soft, nontender  Extremities: No pedal edema  Neuro: Sleeping but arousable      Intake/Output last 3 Shifts:    Intake/Output Summary (Last 24  hours) at 1/11/2025 1344  Last data filed at 1/11/2025 1102  Gross per 24 hour   Intake 1305.33 ml   Output 3406 ml   Net -2100.67 ml       Relevant Results    Results for orders placed or performed during the hospital encounter of 01/10/25 (from the past 24 hours)   Blood Gas Venous Full Panel   Result Value Ref Range    POCT pH, Venous 7.20 (LL) 7.33 - 7.43 pH    POCT pCO2, Venous 73 (HH) 41 - 51 mm Hg    POCT pO2, Venous 48 (H) 35 - 45 mm Hg    POCT SO2, Venous 80 (H) 45 - 75 %    POCT Oxy Hemoglobin, Venous 79.1 (H) 45.0 - 75.0 %    POCT Hematocrit Calculated, Venous 31.0 (L) 36.0 - 46.0 %    POCT Sodium, Venous 124 (L) 136 - 145 mmol/L    POCT Potassium, Venous 4.2 3.5 - 5.3 mmol/L    POCT Chloride, Venous 90 (L) 98 - 107 mmol/L    POCT Ionized Calicum, Venous 1.13 1.10 - 1.33 mmol/L    POCT Glucose, Venous 79 74 - 99 mg/dL    POCT Lactate, Venous 0.4 0.4 - 2.0 mmol/L    POCT Base Excess, Venous -0.7 -2.0 - 3.0 mmol/L    POCT HCO3 Calculated, Venous 28.5 (H) 22.0 - 26.0 mmol/L    POCT Hemoglobin, Venous 10.3 (L) 12.0 - 16.0 g/dL    POCT Anion Gap, Venous 10.0 10.0 - 25.0 mmol/L    Patient Temperature 37.0 degrees Celsius    FiO2 40 %   POCT GLUCOSE   Result Value Ref Range    POCT Glucose 79 74 - 99 mg/dL   Blood Gas Venous Full Panel   Result Value Ref Range    POCT pH, Venous 7.26 (L) 7.33 - 7.43 pH    POCT pCO2, Venous 69 (H) 41 - 51 mm Hg    POCT pO2, Venous 42 35 - 45 mm Hg    POCT SO2, Venous 76 (H) 45 - 75 %    POCT Oxy Hemoglobin, Venous 74.5 45.0 - 75.0 %    POCT Hematocrit Calculated, Venous 33.0 (L) 36.0 - 46.0 %    POCT Sodium, Venous 130 (L) 136 - 145 mmol/L    POCT Potassium, Venous 3.3 (L) 3.5 - 5.3 mmol/L    POCT Chloride, Venous 95 (L) 98 - 107 mmol/L    POCT Ionized Calicum, Venous 1.13 1.10 - 1.33 mmol/L    POCT Glucose, Venous 66 (L) 74 - 99 mg/dL    POCT Lactate, Venous 0.6 0.4 - 2.0 mmol/L    POCT Base Excess, Venous 2.5 -2.0 - 3.0 mmol/L    POCT HCO3 Calculated, Venous 31.0 (H) 22.0 -  26.0 mmol/L    POCT Hemoglobin, Venous 10.9 (L) 12.0 - 16.0 g/dL    POCT Anion Gap, Venous 7.0 (L) 10.0 - 25.0 mmol/L    Patient Temperature 37.0 degrees Celsius    FiO2 40 %   POCT GLUCOSE   Result Value Ref Range    POCT Glucose 57 (L) 74 - 99 mg/dL   Calcium, ionized   Result Value Ref Range    POCT Calcium, Ionized 1.07 (L) 1.1 - 1.33 mmol/L   Magnesium   Result Value Ref Range    Magnesium 1.77 1.60 - 2.40 mg/dL   Renal function panel   Result Value Ref Range    Glucose 63 (L) 74 - 99 mg/dL    Sodium 133 (L) 136 - 145 mmol/L    Potassium 3.6 3.5 - 5.3 mmol/L    Chloride 96 (L) 98 - 107 mmol/L    Bicarbonate 31 21 - 32 mmol/L    Anion Gap 10 10 - 20 mmol/L    Urea Nitrogen 16 6 - 23 mg/dL    Creatinine 1.75 (H) 0.50 - 1.05 mg/dL    eGFR 31 (L) >60 mL/min/1.73m*2    Calcium 7.9 (L) 8.6 - 10.3 mg/dL    Phosphorus 1.9 (L) 2.5 - 4.9 mg/dL    Albumin 2.9 (L) 3.4 - 5.0 g/dL   POCT GLUCOSE   Result Value Ref Range    POCT Glucose 103 (H) 74 - 99 mg/dL   POCT GLUCOSE   Result Value Ref Range    POCT Glucose 87 74 - 99 mg/dL   POCT GLUCOSE   Result Value Ref Range    POCT Glucose 71 (L) 74 - 99 mg/dL   CBC   Result Value Ref Range    WBC 11.8 (H) 4.4 - 11.3 x10*3/uL    nRBC 0.0 0.0 - 0.0 /100 WBCs    RBC 3.09 (L) 4.00 - 5.20 x10*6/uL    Hemoglobin 9.7 (L) 12.0 - 16.0 g/dL    Hematocrit 30.3 (L) 36.0 - 46.0 %    MCV 98 80 - 100 fL    MCH 31.4 26.0 - 34.0 pg    MCHC 32.0 32.0 - 36.0 g/dL    RDW 13.5 11.5 - 14.5 %    Platelets 178 150 - 450 x10*3/uL   Calcium, Ionized   Result Value Ref Range    POCT Calcium, Ionized 1.06 (L) 1.1 - 1.33 mmol/L   Magnesium   Result Value Ref Range    Magnesium 1.74 1.60 - 2.40 mg/dL   Renal function panel   Result Value Ref Range    Glucose 71 (L) 74 - 99 mg/dL    Sodium 135 (L) 136 - 145 mmol/L    Potassium 3.8 3.5 - 5.3 mmol/L    Chloride 98 98 - 107 mmol/L    Bicarbonate 31 21 - 32 mmol/L    Anion Gap 10 10 - 20 mmol/L    Urea Nitrogen 19 6 - 23 mg/dL    Creatinine 2.25 (H) 0.50 - 1.05  mg/dL    eGFR 23 (L) >60 mL/min/1.73m*2    Calcium 7.4 (L) 8.6 - 10.3 mg/dL    Phosphorus 1.9 (L) 2.5 - 4.9 mg/dL    Albumin 2.7 (L) 3.4 - 5.0 g/dL   Blood Gas Venous Full Panel   Result Value Ref Range    POCT pH, Venous 7.35 7.33 - 7.43 pH    POCT pCO2, Venous 56 (H) 41 - 51 mm Hg    POCT pO2, Venous 46 (H) 35 - 45 mm Hg    POCT SO2, Venous 80 (H) 45 - 75 %    POCT Oxy Hemoglobin, Venous 78.7 (H) 45.0 - 75.0 %    POCT Hematocrit Calculated, Venous 30.0 (L) 36.0 - 46.0 %    POCT Sodium, Venous 130 (L) 136 - 145 mmol/L    POCT Potassium, Venous 3.9 3.5 - 5.3 mmol/L    POCT Chloride, Venous 97 (L) 98 - 107 mmol/L    POCT Ionized Calicum, Venous 1.13 1.10 - 1.33 mmol/L    POCT Glucose, Venous 72 (L) 74 - 99 mg/dL    POCT Lactate, Venous 0.6 0.4 - 2.0 mmol/L    POCT Base Excess, Venous 4.3 (H) -2.0 - 3.0 mmol/L    POCT HCO3 Calculated, Venous 30.9 (H) 22.0 - 26.0 mmol/L    POCT Hemoglobin, Venous 10.1 (L) 12.0 - 16.0 g/dL    POCT Anion Gap, Venous 6.0 (L) 10.0 - 25.0 mmol/L    Patient Temperature 37.0 degrees Celsius    FiO2 30 %    Ventilator Mode BiPAP     Ventilator Rate 16 bpm    Inspiratory Time 0.9     Ipap CMH2O 20.0 cm H2O    Epap CMH2O 8.0 cm H2O   POCT GLUCOSE   Result Value Ref Range    POCT Glucose 79 74 - 99 mg/dL   aPTT   Result Value Ref Range    aPTT 29.8 22.0 - 32.5 seconds   POCT GLUCOSE   Result Value Ref Range    POCT Glucose 99 74 - 99 mg/dL     *Note: Due to a large number of results and/or encounters for the requested time period, some results have not been displayed. A complete set of results can be found in Results Review.         Assessment/Plan   ESRD on HD  AVG thrombosis  Fluid overload  Hyponatremia  Anemia in CKD, at goal    She had a temporary right IJ trialysis catheter placed yesterday and was dialyzed afterwards.    Chest x-ray done earlier today suggested persistent bilateral pleural effusions as well as lung infiltrates.  I will plan for additional IUF today in an attempt to  optimize her pulmonary status.    Continue other care.      Kevin Romeo MD

## 2025-01-11 NOTE — NURSING NOTE
Dialysis started  at bedside per dialysis tech    Reason for call:  Symptom   Symptom or request: headaches from concussion    Duration (how long have symptoms been present): since accident  Have you been treated for this before? Yes    Additional comments: patient asking for prescription for pain medication to be sent to [facility]    Phone number to reach patient:  Home number on file [phone] (home)    Best Time:  any    Can we leave a detailed message on this number?  YES    Travel screening: Not Applicable

## 2025-01-11 NOTE — PROGRESS NOTES
Daphne Morris is a 69 y.o. female on day 1 of admission presenting with Clotted renal dialysis AV graft (CMS-[facility]).      Subjective  Patient discussed with nursing  She is in the ICU due to respiratory distress, on 6 L of oxygen  Right IJ hemodialysis catheter placed yesterday for urgent dialysis  Tentative plan is for graftogram with thrombectomy by IR Monday    Objective        Last Recorded Vitals      1/11/2025     6:00 AM 1/11/2025     7:00 AM 1/11/2025     8:00 AM 1/11/2025    10:25 AM 1/11/2025    10:40 AM 1/11/2025    11:32 AM 1/11/2025     2:17 PM   Vitals   Systolic 140 158  141 141     Diastolic 97 69  54 54     Heart Rate 71 70  71 71  77   Temp   36.5 °C (97.7 °F)   36.7 °C (98.1 °F) 36.2 °C (97.2 °F)   Resp 18 18  22 25     Weight (lb)   154.32       BMI   24.91 kg/m2       BSA (m2)   1.81 m2           Imaging  XR chest 1 view    Result Date: 1/11/2025  Interpreted By:  Silver Vargas, STUDY: XR CHEST 1 VIEW; 1/11/2025 8:59 am   INDICATION: CLINICAL INFORMATION: Signs/Symptoms:Pleural effusion assessment.   COMPARISON: 01/10/2025   ACCESSION NUMBER(S): [id number]   ORDERING CLINICIAN: DUANE REID   TECHNIQUE: Portable chest one view.   FINDINGS: The cardiac silhouette is quite prominent suggesting cardiomegaly. Right-sided central venous catheter tip overlies the SVC above the right atrium. Extensive bilateral alveolar infiltrate is present. Density is greatest within the right midlung and right lung base. Small effusions are suspected, possibly greater on the right.       Cardiomegaly with bilateral infiltrates and possible bilateral effusions. Accounting for differences in patient positioning the findings are thought to be similar compared to the study from 01/10/2025.   MACRO: none   Signed by: Silver Vargas 1/11/2025 10:50 AM Dictation workstation:   [id number]    XR chest 1 view    Result Date: 1/10/2025  Interpreted By:  Nuha Cheatham, STUDY: XR CHEST 1 VIEW;  1/10/2025 2:27 pm   INDICATION:  Signs/Symptoms:line placement.   COMPARISON: 01/09/2025 CT chest dated 05/18/2024   ACCESSION NUMBER(S): [id number]   ORDERING CLINICIAN: BARBARA LYNCH   FINDINGS: The cardiac silhouette is enlarged. There are atherosclerotic changes of the aorta.   There is bilateral parenchymal infiltration. There is a right pleural effusion.   There is suggestion of a venous catheter on the right with the tip in the region of the superior vena cava. The bones are not well seen.   COMPARISON OF FINDING: The chest is similar.       Bilateral parenchymal infiltration. Probable right pleural effusion.   MACRO: none   Signed by: Nuha Cheatham 1/10/2025 3:27 PM Dictation workstation:   [id number]    CT head wo IV contrast    Result Date: 1/10/2025  Interpreted By:  Silver Vargas, STUDY: CT HEAD WO IV CONTRAST; 1/10/2025 12:49 pm   INDICATION: Signs/Symptoms:altered mentation.   COMPARISON: 12/31/2024   ACCESSION NUMBER(S): [id number]   ORDERING CLINICIAN: GÉNESIS MARTEL   TECHNIQUE: A helical acquisition data was obtained.   One or more of the following dose reduction techniques were used: Automated exposure control Adjustment of the mA and/or kV according to patient size, and/or use of iterative reconstruction technique.   FINDINGS: COMMENTS: Several of the slices are obscured by motion artifact.   Intracranial findings: There is no evidence for intracranial hemorrhage or mass effect. Age-related atrophy is present. Periventricular white matter hypodensity is present, most consistent with age-related change and/or white matter ischemic disease.   Paranasal sinuses and temporal bone findings:  The visualized portions of the paranasal sinuses, mastoid air cells, and middle ear cavities are clear.   Orbital findings: There is hyperdense material within the globe of the right eye occupying a majority of the globe with this partially obscured by motion artifact. This was present on 12/31/2024 but not identified on 05/14/2024 or  04/06/2021. Please correlate with physical exam. I am not certain whether this represents hemorrhage, mass, or some type of treatment/prosthetic.       No acute intracranial pathologic findings are identified. Age-related intracranial findings are present, stable. Abnormal appearance of the right eye for which clinical correlation is recommended as above   MACRO: none   Signed by: Silver Vargas 1/10/2025 2:07 PM Dictation workstation:   [id number]       Relevant Results  Results for orders placed or performed during the hospital encounter of 01/10/25 (from the past 24 hours)   POCT GLUCOSE   Result Value Ref Range    POCT Glucose 79 74 - 99 mg/dL   Blood Gas Venous Full Panel   Result Value Ref Range    POCT pH, Venous 7.26 (L) 7.33 - 7.43 pH    POCT pCO2, Venous 69 (H) 41 - 51 mm Hg    POCT pO2, Venous 42 35 - 45 mm Hg    POCT SO2, Venous 76 (H) 45 - 75 %    POCT Oxy Hemoglobin, Venous 74.5 45.0 - 75.0 %    POCT Hematocrit Calculated, Venous 33.0 (L) 36.0 - 46.0 %    POCT Sodium, Venous 130 (L) 136 - 145 mmol/L    POCT Potassium, Venous 3.3 (L) 3.5 - 5.3 mmol/L    POCT Chloride, Venous 95 (L) 98 - 107 mmol/L    POCT Ionized Calicum, Venous 1.13 1.10 - 1.33 mmol/L    POCT Glucose, Venous 66 (L) 74 - 99 mg/dL    POCT Lactate, Venous 0.6 0.4 - 2.0 mmol/L    POCT Base Excess, Venous 2.5 -2.0 - 3.0 mmol/L    POCT HCO3 Calculated, Venous 31.0 (H) 22.0 - 26.0 mmol/L    POCT Hemoglobin, Venous 10.9 (L) 12.0 - 16.0 g/dL    POCT Anion Gap, Venous 7.0 (L) 10.0 - 25.0 mmol/L    Patient Temperature 37.0 degrees Celsius    FiO2 40 %   POCT GLUCOSE   Result Value Ref Range    POCT Glucose 57 (L) 74 - 99 mg/dL   Calcium, ionized   Result Value Ref Range    POCT Calcium, Ionized 1.07 (L) 1.1 - 1.33 mmol/L   Magnesium   Result Value Ref Range    Magnesium 1.77 1.60 - 2.40 mg/dL   Renal function panel   Result Value Ref Range    Glucose 63 (L) 74 - 99 mg/dL    Sodium 133 (L) 136 - 145 mmol/L    Potassium 3.6 3.5 - 5.3 mmol/L     Chloride 96 (L) 98 - 107 mmol/L    Bicarbonate 31 21 - 32 mmol/L    Anion Gap 10 10 - 20 mmol/L    Urea Nitrogen 16 6 - 23 mg/dL    Creatinine 1.75 (H) 0.50 - 1.05 mg/dL    eGFR 31 (L) >60 mL/min/1.73m*2    Calcium 7.9 (L) 8.6 - 10.3 mg/dL    Phosphorus 1.9 (L) 2.5 - 4.9 mg/dL    Albumin 2.9 (L) 3.4 - 5.0 g/dL   POCT GLUCOSE   Result Value Ref Range    POCT Glucose 103 (H) 74 - 99 mg/dL   POCT GLUCOSE   Result Value Ref Range    POCT Glucose 87 74 - 99 mg/dL   POCT GLUCOSE   Result Value Ref Range    POCT Glucose 71 (L) 74 - 99 mg/dL   CBC   Result Value Ref Range    WBC 11.8 (H) 4.4 - 11.3 x10*3/uL    nRBC 0.0 0.0 - 0.0 /100 WBCs    RBC 3.09 (L) 4.00 - 5.20 x10*6/uL    Hemoglobin 9.7 (L) 12.0 - 16.0 g/dL    Hematocrit 30.3 (L) 36.0 - 46.0 %    MCV 98 80 - 100 fL    MCH 31.4 26.0 - 34.0 pg    MCHC 32.0 32.0 - 36.0 g/dL    RDW 13.5 11.5 - 14.5 %    Platelets 178 150 - 450 x10*3/uL   Calcium, Ionized   Result Value Ref Range    POCT Calcium, Ionized 1.06 (L) 1.1 - 1.33 mmol/L   Magnesium   Result Value Ref Range    Magnesium 1.74 1.60 - 2.40 mg/dL   Renal function panel   Result Value Ref Range    Glucose 71 (L) 74 - 99 mg/dL    Sodium 135 (L) 136 - 145 mmol/L    Potassium 3.8 3.5 - 5.3 mmol/L    Chloride 98 98 - 107 mmol/L    Bicarbonate 31 21 - 32 mmol/L    Anion Gap 10 10 - 20 mmol/L    Urea Nitrogen 19 6 - 23 mg/dL    Creatinine 2.25 (H) 0.50 - 1.05 mg/dL    eGFR 23 (L) >60 mL/min/1.73m*2    Calcium 7.4 (L) 8.6 - 10.3 mg/dL    Phosphorus 1.9 (L) 2.5 - 4.9 mg/dL    Albumin 2.7 (L) 3.4 - 5.0 g/dL   Blood Gas Venous Full Panel   Result Value Ref Range    POCT pH, Venous 7.35 7.33 - 7.43 pH    POCT pCO2, Venous 56 (H) 41 - 51 mm Hg    POCT pO2, Venous 46 (H) 35 - 45 mm Hg    POCT SO2, Venous 80 (H) 45 - 75 %    POCT Oxy Hemoglobin, Venous 78.7 (H) 45.0 - 75.0 %    POCT Hematocrit Calculated, Venous 30.0 (L) 36.0 - 46.0 %    POCT Sodium, Venous 130 (L) 136 - 145 mmol/L    POCT Potassium, Venous 3.9 3.5 - 5.3 mmol/L     POCT Chloride, Venous 97 (L) 98 - 107 mmol/L    POCT Ionized Calicum, Venous 1.13 1.10 - 1.33 mmol/L    POCT Glucose, Venous 72 (L) 74 - 99 mg/dL    POCT Lactate, Venous 0.6 0.4 - 2.0 mmol/L    POCT Base Excess, Venous 4.3 (H) -2.0 - 3.0 mmol/L    POCT HCO3 Calculated, Venous 30.9 (H) 22.0 - 26.0 mmol/L    POCT Hemoglobin, Venous 10.1 (L) 12.0 - 16.0 g/dL    POCT Anion Gap, Venous 6.0 (L) 10.0 - 25.0 mmol/L    Patient Temperature 37.0 degrees Celsius    FiO2 30 %    Ventilator Mode BiPAP     Ventilator Rate 16 bpm    Inspiratory Time 0.9     Ipap CMH2O 20.0 cm H2O    Epap CMH2O 8.0 cm H2O   POCT GLUCOSE   Result Value Ref Range    POCT Glucose 79 74 - 99 mg/dL   aPTT   Result Value Ref Range    aPTT 29.8 22.0 - 32.5 seconds   POCT GLUCOSE   Result Value Ref Range    POCT Glucose 99 74 - 99 mg/dL     *Note: Due to a large number of results and/or encounters for the requested time period, some results have not been displayed. A complete set of results can be found in Results Review.       Physical Exam  General: Awake, alert, follows commands.    HEENT: Head is atraumatic, normocephalic.  Right neck dialysis catheter  Cardiac: Regular rate and rhythm.    Respiratory: Lungs clear to auscultation.  No adventitious sounds.  Abdomen: Soft, nondistended, nontender.  Bowel sounds x4 quadrants.  Pulse exam: Palpable brachial and radial pulses bilaterall.   femoral, popliteal, pedal pulses are palpable bilaterally.  Extremities:  left brachial axillary graft has no audible thrill or bruit  Neuro: Moves all extremities spontaneously.  No focal deficits.  Psych: Appropriate affect.  Answers questions appropriately.       Assessment/Plan  End-stage renal disease requiring hemodialysis  Clotted brachial axillary loop graft     Patient has clotted left brachial axillary loop graft.      Transferred to ICU on 1/10 for respiratory distress.  Right IJ tunneled dialysis catheter placed for urgent dialysis.  She remains confused on  exam.  She is on 6 L of oxygen.  Tentative plan for graftogram with thrombectomy by IR on Monday.  Our service will continue to follow until thrombectomy has been performed.

## 2025-01-11 NOTE — CARE PLAN
Problem: Skin  Goal: Decreased wound size/increased tissue granulation at next dressing change  Outcome: Progressing  Flowsheets (Taken 1/11/2025 1340)  Decreased wound size/increased tissue granulation at next dressing change:   Promote sleep for wound healing   Protective dressings over bony prominences  Goal: Participates in plan/prevention/treatment measures  Outcome: Progressing  Flowsheets (Taken 1/11/2025 1340)  Participates in plan/prevention/treatment measures: Elevate heels  Goal: Prevent/manage excess moisture  Outcome: Progressing  Flowsheets (Taken 1/11/2025 1340)  Prevent/manage excess moisture:   Follow provider orders for dressing changes   Cleanse incontinence/protect with barrier cream   Moisturize dry skin  Goal: Prevent/minimize sheer/friction injuries  Outcome: Progressing  Flowsheets (Taken 1/11/2025 1340)  Prevent/minimize sheer/friction injuries:   HOB 30 degrees or less   Turn/reposition every 2 hours/use positioning/transfer devices   Use pull sheet  Goal: Promote/optimize nutrition  Outcome: Progressing  Flowsheets (Taken 1/11/2025 1340)  Promote/optimize nutrition:   Assist with feeding   Monitor/record intake including meals  Goal: Promote skin healing  Outcome: Progressing  Flowsheets (Taken 1/10/2025 1603)  Promote skin healing:   Protective dressings over bony prominences   Turn/reposition every 2 hours/use positioning/transfer devices     Problem: Nutrition  Goal: Oral intake greater 75%  Outcome: Progressing  Goal: Consume prescribed supplement  Outcome: Progressing  Goal: Promote healing  Outcome: Progressing     Problem: Grooming  Goal: STG - Patient completes grooming with set up  Outcome: Progressing     Problem: Toileting  Goal: STG - Patient will complete toileting tasks with 2ww and minimal assist  Outcome: Progressing     Problem: Dressing Upper Extremities  Goal: LTG - Patient will complete upper body dressing with set up  Outcome: Progressing     Problem: Bathing  Goal:  STG - Patient will bathe upper body with minimal assist  Outcome: Progressing     Problem: Respiratory  Goal: Minimal/no exertional discomfort or dyspnea this shift  Outcome: Progressing  Goal: No signs of respiratory distress (eg. Use of accessory muscles. Peds grunting)  Outcome: Progressing  Goal: Patent airway maintained this shift  Outcome: Progressing  Goal: Verbalize decreased shortness of breath this shift  Outcome: Progressing  Goal: Wean oxygen to maintain O2 saturation per order/standard this shift  Outcome: Progressing     Problem: Dialysis  Goal: I will slow progression of end-stage renal disease through participating in dialysis 3 times a week  Outcome: Progressing   The patient's goals for the shift include      The clinical goals for the shift include decrease oxygen demand    Over the shift, the patient did not make progress toward the following goals. Barriers to progression include fluid overload. Recommendations to address these barriers include dialysis monitor I/o  encourage movement .

## 2025-01-11 NOTE — CARE PLAN
Problem: Bathing  Goal: STG - Patient will bathe upper body with minimal assist  Outcome: Progressing     Problem: Dressing Upper Extremities  Goal: LTG - Patient will complete upper body dressing with set up  Outcome: Progressing     Problem: Grooming  Goal: STG - Patient completes grooming with set up  Outcome: Progressing     Problem: Toileting  Goal: STG - Patient will complete toileting tasks with 2ww and minimal assist  Outcome: Progressing     Problem: OT Transfers  Goal: LTG - Patient will perform all functional transfers with 2ww and minimal assist  Outcome: Progressing

## 2025-01-11 NOTE — POST-PROCEDURE NOTE
.Report to Receiving RN:    Report To: BEATRICE Deng  Time Report Called: Given at bedside @7876  Hand-Off Communication: Pt removed 3 liters of fluid. Pt stable post treatment. Vitals are stable 132/54 HR 64 Temp 36.3  Complications During Treatment: No  Ultrafiltration Treatment: No  Medications Administered During Dialysis: No  Blood Products Administered During Dialysis: No  Labs Sent During Dialysis: No  Heparin Drip Rate Changes: No  Dialysis Catheter Dressing: YES  Last Dressing Change: 1/10/2025    Electronic Signatures:   (Signed Randall Ornelas OCDT)      Last Updated: 7:06 PM by RANDALL ORNELAS

## 2025-01-11 NOTE — PROGRESS NOTES
Occupational Therapy    Evaluation/Treatment    Patient Name: Daphne Morris  MRN: [id number]  Department: [facility] 3 S ICU  Room: 10/10-A  Today's Date: 01/11/25  Time Calculation  Start Time: 1030  Stop Time: 1055  Time Calculation (min): 25 min       Assessment:  OT Assessment: Referral received, chart reviewed, and evaluation complete.  Presents with weakness, decreased aerobic capacity, coordinationm, and balance.  Would benefit from acute OT services. Recommend moderate intensity upon discharge.  Prognosis: Fair  Barriers to Discharge Home: Caregiver assistance, Physical needs  Caregiver Assistance: Patient lives alone and/or does not have reliable caregiver assistance  Physical Needs: Stair navigation into home limited by function/safety, Ambulating household distances limited by function/safety, 24hr mobility assistance needed, 24hr ADL assistance needed  Evaluation/Treatment Tolerance: Patient limited by fatigue  Medical Staff Made Aware: Yes  End of Session Communication: Bedside nurse  End of Session Patient Position: Bed, 4 rail up, Alarm on  Prognosis: Fair  Evaluation/Treatment Tolerance: Patient limited by fatigue  Medical Staff Made Aware: Yes  Barriers to Participation: Comorbidities  Plan:  Treatment Interventions: ADL retraining, Functional transfer training, Endurance training, Patient/family training, Neuromuscular reeducation, Equipment evaluation/education, Compensatory technique education  OT Frequency: 4 times per week  OT Discharge Recommendations: Moderate intensity level of continued care  Equipment Recommended upon Discharge: Wheeled walker  OT Recommended Transfer Status: Assist of 2  OT - OK to Discharge: Yes  Treatment Interventions: ADL retraining, Functional transfer training, Endurance training, Patient/family training, Neuromuscular reeducation, Equipment evaluation/education, Compensatory technique education    Subjective   Current Problem:  1. ESRD (end stage renal disease) (Multi)           General:   OT Received On: 01/11/25  General  Reason for Referral: decreased functional status due to ICU admit  Referred By: Gideon Melgar MD  Past Medical History Relevant to Rehab: ESRD, anemia, AFib, CHF, COPD, CVA, DM, respiratory failure, HLD, CA, HTN (P)  Family/Caregiver Present: No  Co-Treatment: PT  Co-Treatment Reason: medically complex patient in the ICU  Prior to Session Communication: Bedside nurse  Patient Position Received: Bed, 3 rail up, Alarm on  General Comment: 69 year old WF admitted with c/o clotted renal dialysis AV graft   Precautions:  Hearing/Visual Limitations: [place] wears bilateral hearing aides, wears glasses  Medical Precautions: Fall precautions, Oxygen therapy device and L/min  Precautions Comment: currently on the mini bubbler for 02 support    Vital Sign (Past 2hrs)        Date/Time Vitals Session Patient Position Pulse Resp SpO2 BP MAP (mmHg)    01/11/25 1025 --  --  71  22  100 %  141/54  81     01/11/25 1143 --  --  --  --  95 %  --  --                         Pain:  Pain Assessment  Pain Assessment: 0-10  0-10 (Numeric) Pain Score: 0 - No pain    Objective   Cognition:  Overall Cognitive Status: Within Functional Limits  Orientation Level: Oriented X4  Processing Speed: Delayed           Home Living:  Type of Home: Mobile home  Lives With: Alone  Home Adaptive Equipment: Walker rolling or standard  Home Layout: One level  Home Access: Stairs to enter with rails  Entrance Stairs-Rails: Right  Entrance Stairs-Number of Steps: 4  Bathroom Shower/Tub: Walk-in shower  Bathroom Toilet: Standard  Bathroom Equipment: Grab bars in shower, Shower chair with back  Prior Function:  Level of [place]: Independent with ADLs and functional transfers, Independent with homemaking with ambulation  Receives Help From: Neighbor  ADL Assistance: Independent  Homemaking Assistance: Independent  Ambulatory Assistance: Independent  Vocational: Retired  Hand Dominance: Right  Prior Function  Comments: Has assist with shopping from her neighbor  IADL History:  Homemaking Responsibilities: Yes  Current License: No  ADL:  Eating Assistance: Minimal  Grooming Assistance: Moderate  Bathing Assistance: Maximal  UE Dressing Assistance: Maximal  LE Dressing Assistance: Total  Toileting Assistance with Device: Total    Activity Tolerance:  Endurance: Decreased tolerance for upright activites       Bed Mobility/Transfers: Bed Mobility  Bed Mobility: Yes  Bed Mobility 1  Bed Mobility 1: Long sit  Level of Assistance 1: Moderate assistance  Bed Mobility Comments 1: initially needed maximum assist to sit up but was able to downgrade assist to moderate  Aerobic capacity is impaired unable to tolerate more than 1-2 minutes       Sensation:  Light Touch: No apparent deficits  Strength:  Strength Comments: Bilateral shoulders 2+/5, elbow 3/5, hand 3/5       Coordination:  Movements are Fluid and Coordinated: No   Hand Function:  Hand Function  Gross Grasp: Functional  Coordination: Impaired        Outcome Measures: [facility] Daily Activity  Putting on and taking off regular lower body clothing: Total  Bathing (including washing, rinsing, drying): A lot  Putting on and taking off regular upper body clothing: A lot  Toileting, which includes using toilet, bedpan or urinal: Total  Taking care of personal grooming such as brushing teeth: A lot  Eating Meals: A little  Daily Activity - Total Score: 11        Education Documentation  Body Mechanics, taught by Aruna Ruiz OT at 1/11/2025 11:54 AM.  Learner: Patient  Readiness: Acceptance  Method: Explanation, Demonstration  Response: Needs Reinforcement    Precautions, taught by Aruna Ruiz OT at 1/11/2025 11:54 AM.  Learner: Patient  Readiness: Acceptance  Method: Explanation, Demonstration  Response: Needs Reinforcement    ADL Training, taught by Aruna Ruiz OT at 1/11/2025 11:54 AM.  Learner: Patient  Readiness: Acceptance  Method: Explanation,  Demonstration  Response: Needs Reinforcement    Education Comments  No comments found.        OP EDUCATION:       Goals:         Problem: Bathing  Goal: STG - Patient will bathe upper body with minimal assist  Outcome: Progressing     Problem: Dressing Upper Extremities  Goal: LTG - Patient will complete upper body dressing with set up  Outcome: Progressing     Problem: Grooming  Goal: STG - Patient completes grooming with set up  Outcome: Progressing     Problem: Toileting  Goal: STG - Patient will complete toileting tasks with 2ww and minimal assist  Outcome: Progressing     Problem: OT Transfers  Goal: LTG - Patient will perform all functional transfers with 2ww and minimal assist  Outcome: Progressing

## 2025-01-11 NOTE — POST-PROCEDURE NOTE
..Report to Receiving RN:    Report To: Loulou Gallagher RN  Time Report Called: 5414/  Hand-Off Communication: pt received 2.5hr UF treatment  Complications During Treatment: No  Ultrafiltration Treatment: Yes, 2L  Medications Administered During Dialysis: No  Blood Products Administered During Dialysis: No  Labs Sent During Dialysis: No  Heparin Drip Rate Changes: No  Dialysis Catheter Dressing: clean, dry, and intact  Last Dressing Change: 1/10/24    Electronic Signatures:   (Signed )   Authored:    (Signed )   Authored:     Last Updated: 5:18 PM by JAYLAN FIGUEROA

## 2025-01-11 NOTE — PROGRESS NOTES
Physical Therapy    Physical Therapy Evaluation    Patient Name: Daphne Morris  MRN: [id number]  Department: [address] ICU  Room: 10/10-A  Today's Date: 1/11/2025   Time Calculation  Start Time: 1040  Stop Time: 1051  Time Calculation (min): 11 min    Assessment/Plan   PT Assessment  PT Assessment Results: Decreased strength, Decreased range of motion, Decreased endurance, Decreased mobility, Decreased coordination, Impaired hearing, Decreased skin integrity  Rehab Prognosis: Good  Caregiver Assistance: Patient lives alone and/or does not have reliable caregiver assistance  Physical Needs: Stair navigation into home limited by function/safety, Ambulating household distances limited by function/safety  Evaluation/Treatment Tolerance: Treatment limited secondary to medical complications (Comment)  Medical Staff Made Aware: Yes  Strengths: Premorbid level of function  Barriers to Participation: Comorbidities  End of Session Communication: Bedside nurse  Assessment Comment: Pt taken off bipap and switched to high flow bubbler nasal cannula this morning. SpO2 remains in normal limits throughout session, however, RR increases with active movement of limbs. D/t endurance deficits, attempt to sit edge of bed was deferred. Pt able to long sit for 1-2 minutes with moderate assistance. plan to progress to sitting edge of bed in upcoming sessions as appropriate.  End of Session Patient Position: Bed, 4 rail up, Alarm on  IP OR SWING BED PT PLAN  Inpatient or Swing Bed: Inpatient  PT Plan  Treatment/Interventions: Bed mobility, Transfer training, Gait training, Stair training, Balance training, Neuromuscular re-education, Strengthening, Endurance training, Range of motion, Therapeutic exercise, Therapeutic activity, Postural re-education, Positioning, Home exercise program  PT Plan: Ongoing PT  PT Frequency: 4 times per week  PT Discharge Recommendations: Moderate intensity level of continued care  Equipment Recommended upon  Discharge: Wheeled walker  PT Recommended Transfer Status: Assist x2    Subjective   General Visit Information:  General  Reason for Referral: impaired function d/t hypoxemia  Referred By: Gideon Melgar MD  Past Medical History Relevant to Rehab: ESRD, anemia, AFib, CHF, COPD, CVA, DM, respiratory failure, HLD, CA, HTN (P)  Family/Caregiver Present: No  Co-Treatment: OT  Co-Treatment Reason: medically complex patient in the ICU  Prior to Session Communication: Bedside nurse  Patient Position Received: Bed, 3 rail up, Alarm on  General Comment: 69 year old WF admitted with c/o clotted renal dialysis AV graft. Found to be in hypercapnic respiratory acidosis and hypoxemia.  Home Living:  Home Living  Type of Home: Mobile home  Lives With: Alone  Home Adaptive Equipment: Walker rolling or standard  Home Layout: One level  Home Access: Stairs to enter with rails  Entrance Stairs-Rails: Right  Entrance Stairs-Number of Steps: 4  Bathroom Shower/Tub: Walk-in shower  Bathroom Toilet: Standard  Bathroom Equipment: Grab bars in shower, Shower chair with back  Prior Level of Function:  Prior Function Per Pt/Caregiver Report  Level of [place]: Independent with ADLs and functional transfers, Independent with homemaking with ambulation  Receives Help From: Neighbor  ADL Assistance: Independent  Homemaking Assistance: Independent  Ambulatory Assistance: Independent  Vocational: Retired  Hand Dominance: Right  Prior Function Comments: Has assist with shopping from her neighbor  Precautions:  Precautions  Hearing/Visual Limitations: [place] wears bilateral hearing aides, wears glasses  Medical Precautions: Fall precautions, Oxygen therapy device and L/min  Precautions Comment: currently on the mini bubbler for 02 support     Vital Signs (Past 2hrs)        Date/Time Vitals Session Patient Position Pulse Resp SpO2 BP MAP (mmHg)    01/11/25 1143 --  --  --  --  95 %  --  --                         Objective   Pain:  Pain  Assessment  Pain Assessment: 0-10  0-10 (Numeric) Pain Score: 0 - No pain  Cognition:  Cognition  Overall Cognitive Status: Within Functional Limits  Orientation Level: Oriented X4  Processing Speed: Delayed    General Assessments:    Activity Tolerance  Endurance: Decreased tolerance for upright activites    Sensation  Light Touch: No apparent deficits    Strength  Strength Comments: b/lknee extensors and ankle dorsiflexors 3+/5, hip flexors 2+/5  Strength  Strength Comments: b/lknee extensors and ankle dorsiflexors 3+/5, hip flexors 2+/5       Coordination  Movements are Fluid and Coordinated: No                 Functional Assessments:  Bed Mobility  Bed Mobility: Yes  Bed Mobility 1  Bed Mobility 1: Long sit  Level of Assistance 1: Moderate assistance  Bed Mobility Comments 1: initially requires MaxA to upright trunk to long sitting position from elevated HOB progressing to ModA. Tolerates 1-2 minutes in assisted long sitting with increase in RR to 28 breathes/min. Returns to 22 breathes/min when resting.  Bed Mobility 2  Bed Mobility Comments 2: deferred supine > sit d/t medical concerns.    Transfer 1  Trials/Comments 1: transfers deferred d/t medical status  Extremity/Trunk Assessments:  RLE   RLE :  (PROM WFL, gross strength limitations)     Outcome Measures:  [facility] Basic Mobility  Turning from your back to your side while in a flat bed without using bedrails: A lot  Moving from lying on your back to sitting on the side of a flat bed without using bedrails: A lot  Moving to and from bed to chair (including a wheelchair): A lot  Standing up from a chair using your arms (e.g. wheelchair or bedside chair): A lot  To walk in hospital room: Total  Climbing 3-5 steps with railing: Total  Basic Mobility - Total Score: 10    Encounter Problems       Encounter Problems (Active)       PT Problem       Patient will ambulate 50' distance using walker with stand by assist (Progressing)       Start:  01/11/25    Expected  End:  01/25/25            Patient will perform chair to and from bed transfer with stand by assist (Progressing)       Start:  01/11/25    Expected End:  01/25/25            Patient will perform supine to sit on bed with stand by assist demonstrating control (Progressing)       Start:  01/11/25    Expected End:  01/25/25                   Education Documentation  No documentation found.  Education Comments  No comments found.

## 2025-01-12 VITALS
BODY MASS INDEX: 24.8 KG/M2 | TEMPERATURE: 98.2 F | HEIGHT: 66 IN | RESPIRATION RATE: 18 BRPM | WEIGHT: 154.32 LBS | OXYGEN SATURATION: 99 % | DIASTOLIC BLOOD PRESSURE: 56 MMHG | SYSTOLIC BLOOD PRESSURE: 142 MMHG | HEART RATE: 67 BPM

## 2025-01-12 LAB
ALBUMIN SERPL BCP-MCNC: 2.6 G/DL (ref 3.4–5)
ANION GAP SERPL CALCULATED.3IONS-SCNC: 11 MMOL/L (ref 10–20)
APTT PPP: 35.6 SECONDS (ref 22–32.5)
APTT PPP: 38.2 SECONDS (ref 22–32.5)
APTT PPP: 53.2 SECONDS (ref 22–32.5)
BUN SERPL-MCNC: 30 MG/DL (ref 6–23)
CA-I BLD-SCNC: 1.05 MMOL/L (ref 1.1–1.33)
CALCIUM SERPL-MCNC: 7.5 MG/DL (ref 8.6–10.3)
CHLORIDE SERPL-SCNC: 97 MMOL/L (ref 98–107)
CO2 SERPL-SCNC: 30 MMOL/L (ref 21–32)
CREAT SERPL-MCNC: 2.93 MG/DL (ref 0.5–1.05)
EGFRCR SERPLBLD CKD-EPI 2021: 17 ML/MIN/1.73M*2
ERYTHROCYTE [DISTWIDTH] IN BLOOD BY AUTOMATED COUNT: 13.4 % (ref 11.5–14.5)
GLUCOSE BLD MANUAL STRIP-MCNC: 100 MG/DL (ref 74–99)
GLUCOSE BLD MANUAL STRIP-MCNC: 104 MG/DL (ref 74–99)
GLUCOSE BLD MANUAL STRIP-MCNC: 125 MG/DL (ref 74–99)
GLUCOSE BLD MANUAL STRIP-MCNC: 87 MG/DL (ref 74–99)
GLUCOSE BLD MANUAL STRIP-MCNC: 91 MG/DL (ref 74–99)
GLUCOSE BLD MANUAL STRIP-MCNC: 93 MG/DL (ref 74–99)
GLUCOSE SERPL-MCNC: 84 MG/DL (ref 74–99)
HCT VFR BLD AUTO: 30 % (ref 36–46)
HGB BLD-MCNC: 9.6 G/DL (ref 12–16)
MAGNESIUM SERPL-MCNC: 1.95 MG/DL (ref 1.6–2.4)
MCH RBC QN AUTO: 31.1 PG (ref 26–34)
MCHC RBC AUTO-ENTMCNC: 32 G/DL (ref 32–36)
MCV RBC AUTO: 97 FL (ref 80–100)
NRBC BLD-RTO: 0 /100 WBCS (ref 0–0)
PHOSPHATE SERPL-MCNC: 2.5 MG/DL (ref 2.5–4.9)
PLATELET # BLD AUTO: 188 X10*3/UL (ref 150–450)
POTASSIUM SERPL-SCNC: 3.5 MMOL/L (ref 3.5–5.3)
RBC # BLD AUTO: 3.09 X10*6/UL (ref 4–5.2)
SODIUM SERPL-SCNC: 134 MMOL/L (ref 136–145)
WBC # BLD AUTO: 13 X10*3/UL (ref 4.4–11.3)

## 2025-01-12 PROCEDURE — 2500000001 HC RX 250 WO HCPCS SELF ADMINISTERED DRUGS (ALT 637 FOR MEDICARE OP)

## 2025-01-12 PROCEDURE — 80069 RENAL FUNCTION PANEL: CPT

## 2025-01-12 PROCEDURE — 85730 THROMBOPLASTIN TIME PARTIAL: CPT

## 2025-01-12 PROCEDURE — 99233 SBSQ HOSP IP/OBS HIGH 50: CPT | Performed by: NURSE PRACTITIONER

## 2025-01-12 PROCEDURE — 2500000004 HC RX 250 GENERAL PHARMACY W/ HCPCS (ALT 636 FOR OP/ED)

## 2025-01-12 PROCEDURE — 85027 COMPLETE CBC AUTOMATED: CPT | Performed by: STUDENT IN AN ORGANIZED HEALTH CARE EDUCATION/TRAINING PROGRAM

## 2025-01-12 PROCEDURE — 2500000005 HC RX 250 GENERAL PHARMACY W/O HCPCS: Performed by: SURGERY

## 2025-01-12 PROCEDURE — 82947 ASSAY GLUCOSE BLOOD QUANT: CPT

## 2025-01-12 PROCEDURE — 94660 CPAP INITIATION&MGMT: CPT

## 2025-01-12 PROCEDURE — 94640 AIRWAY INHALATION TREATMENT: CPT

## 2025-01-12 PROCEDURE — 2500000005 HC RX 250 GENERAL PHARMACY W/O HCPCS

## 2025-01-12 PROCEDURE — 36415 COLL VENOUS BLD VENIPUNCTURE: CPT

## 2025-01-12 PROCEDURE — 1200000002 HC GENERAL ROOM WITH TELEMETRY DAILY

## 2025-01-12 PROCEDURE — 2500000002 HC RX 250 W HCPCS SELF ADMINISTERED DRUGS (ALT 637 FOR MEDICARE OP, ALT 636 FOR OP/ED)

## 2025-01-12 PROCEDURE — 83735 ASSAY OF MAGNESIUM: CPT

## 2025-01-12 PROCEDURE — 82330 ASSAY OF CALCIUM: CPT

## 2025-01-12 RX ORDER — IPRATROPIUM BROMIDE AND ALBUTEROL SULFATE 2.5; .5 MG/3ML; MG/3ML
3 SOLUTION RESPIRATORY (INHALATION)
Status: ACTIVE | OUTPATIENT
Start: 2025-01-13

## 2025-01-12 RX ORDER — METOPROLOL SUCCINATE 50 MG/1
50 TABLET, EXTENDED RELEASE ORAL DAILY
Status: ACTIVE | OUTPATIENT
Start: 2025-01-13

## 2025-01-12 RX ORDER — METOPROLOL SUCCINATE 50 MG/1
50 TABLET, EXTENDED RELEASE ORAL DAILY
Status: DISCONTINUED | OUTPATIENT
Start: 2025-01-13 | End: 2025-01-12

## 2025-01-12 RX ORDER — SODIUM CHLORIDE FOR INHALATION 3 %
3 VIAL, NEBULIZER (ML) INHALATION
Status: DISPENSED | OUTPATIENT
Start: 2025-01-13

## 2025-01-12 RX ORDER — METOPROLOL SUCCINATE 25 MG/1
25 TABLET, EXTENDED RELEASE ORAL DAILY
Status: DISCONTINUED | OUTPATIENT
Start: 2025-01-13 | End: 2025-01-12

## 2025-01-12 RX ORDER — INSULIN LISPRO 100 [IU]/ML
0-5 INJECTION, SOLUTION INTRAVENOUS; SUBCUTANEOUS
Status: DISPENSED | OUTPATIENT
Start: 2025-01-12

## 2025-01-12 RX ADMIN — PANTOPRAZOLE SODIUM 40 MG: 40 INJECTION, POWDER, FOR SOLUTION INTRAVENOUS at 21:19

## 2025-01-12 RX ADMIN — Medication 3 ML: at 07:50

## 2025-01-12 RX ADMIN — LEVOTHYROXINE SODIUM 50 MCG: 0.05 TABLET ORAL at 05:22

## 2025-01-12 RX ADMIN — IPRATROPIUM BROMIDE AND ALBUTEROL SULFATE 3 ML: 2.5; .5 SOLUTION RESPIRATORY (INHALATION) at 18:48

## 2025-01-12 RX ADMIN — Medication 3 ML: at 01:00

## 2025-01-12 RX ADMIN — PANTOPRAZOLE SODIUM 40 MG: 40 INJECTION, POWDER, FOR SOLUTION INTRAVENOUS at 08:52

## 2025-01-12 RX ADMIN — LEVOTHYROXINE SODIUM 200 MCG: 0.1 TABLET ORAL at 05:22

## 2025-01-12 RX ADMIN — VENLAFAXINE HYDROCHLORIDE 150 MG: 150 CAPSULE, EXTENDED RELEASE ORAL at 08:52

## 2025-01-12 RX ADMIN — Medication 3 ML: at 18:48

## 2025-01-12 RX ADMIN — Medication 4 L/MIN: at 18:50

## 2025-01-12 RX ADMIN — IPRATROPIUM BROMIDE AND ALBUTEROL SULFATE 3 ML: 2.5; .5 SOLUTION RESPIRATORY (INHALATION) at 07:50

## 2025-01-12 RX ADMIN — AMIODARONE HYDROCHLORIDE 200 MG: 200 TABLET ORAL at 08:52

## 2025-01-12 RX ADMIN — AMLODIPINE BESYLATE 5 MG: 5 TABLET ORAL at 08:52

## 2025-01-12 RX ADMIN — ATORVASTATIN CALCIUM 40 MG: 40 TABLET, FILM COATED ORAL at 21:19

## 2025-01-12 RX ADMIN — Medication 4 L/MIN: at 07:55

## 2025-01-12 RX ADMIN — ASPIRIN 81 MG: 81 TABLET, COATED ORAL at 08:52

## 2025-01-12 RX ADMIN — IPRATROPIUM BROMIDE AND ALBUTEROL SULFATE 3 ML: 2.5; .5 SOLUTION RESPIRATORY (INHALATION) at 01:00

## 2025-01-12 RX ADMIN — METOPROLOL SUCCINATE 50 MG: 50 TABLET, EXTENDED RELEASE ORAL at 08:52

## 2025-01-12 ASSESSMENT — PAIN - FUNCTIONAL ASSESSMENT
PAIN_FUNCTIONAL_ASSESSMENT: 0-10
PAIN_FUNCTIONAL_ASSESSMENT: CPOT (CRITICAL CARE PAIN OBSERVATION TOOL)
PAIN_FUNCTIONAL_ASSESSMENT: WONG-BAKER FACES
PAIN_FUNCTIONAL_ASSESSMENT: 0-10
PAIN_FUNCTIONAL_ASSESSMENT: 0-10

## 2025-01-12 ASSESSMENT — COGNITIVE AND FUNCTIONAL STATUS - GENERAL
DRESSING REGULAR LOWER BODY CLOTHING: A LOT
HELP NEEDED FOR BATHING: A LOT
DRESSING REGULAR UPPER BODY CLOTHING: A LOT
STANDING UP FROM CHAIR USING ARMS: TOTAL
MOVING FROM LYING ON BACK TO SITTING ON SIDE OF FLAT BED WITH BEDRAILS: A LITTLE
EATING MEALS: A LOT
TURNING FROM BACK TO SIDE WHILE IN FLAT BAD: A LITTLE
PERSONAL GROOMING: A LOT
DAILY ACTIVITIY SCORE: 12
WALKING IN HOSPITAL ROOM: TOTAL
CLIMB 3 TO 5 STEPS WITH RAILING: TOTAL
MOVING TO AND FROM BED TO CHAIR: TOTAL
MOBILITY SCORE: 10
TOILETING: A LOT

## 2025-01-12 ASSESSMENT — PAIN SCALES - GENERAL
PAINLEVEL_OUTOF10: 0 - NO PAIN

## 2025-01-12 NOTE — CARE PLAN
Problem: Skin  Goal: Decreased wound size/increased tissue granulation at next dressing change  1/12/2025 1051 by Marsha Bowen RN  Flowsheets (Taken 1/12/2025 1051)  Decreased wound size/increased tissue granulation at next dressing change:   Promote sleep for wound healing   Utilize specialty bed per algorithm   Protective dressings over bony prominences  1/12/2025 1050 by Marsha Bowen RN  Outcome: Progressing  Goal: Participates in plan/prevention/treatment measures  1/12/2025 1051 by Marsha Bowen RN  Flowsheets (Taken 1/12/2025 1051)  Participates in plan/prevention/treatment measures:   Discuss with provider PT/OT consult   Elevate heels   Increase activity/out of bed for meals  1/12/2025 1050 by Marsha Bowen RN  Outcome: Progressing  Goal: Prevent/manage excess moisture  1/12/2025 1051 by Marsha Bowen RN  Flowsheets (Taken 1/12/2025 1051)  Prevent/manage excess moisture:   Moisturize dry skin   Follow provider orders for dressing changes   Monitor for/manage infection if present   Cleanse incontinence/protect with barrier cream  1/12/2025 1050 by Marsha Bowen RN  Outcome: Progressing  Goal: Prevent/minimize sheer/friction injuries  1/12/2025 1051 by Marsha Bowen RN  Flowsheets (Taken 1/12/2025 1051)  Prevent/minimize sheer/friction injuries:   Complete micro-shifts as needed if patient unable. Adjust patient position to relieve pressure points, not a full turn   Increase activity/out of bed for meals   Use pull sheet   HOB 30 degrees or less   Turn/reposition every 2 hours/use positioning/transfer devices   Utilize specialty bed per algorithm  1/12/2025 1050 by Marsha Bowen RN  Outcome: Progressing  Goal: Promote/optimize nutrition  1/12/2025 1051 by Marsha Bowen RN  Flowsheets (Taken 1/12/2025 1051)  Promote/optimize nutrition:   Monitor/record intake including meals   Offer water/supplements/favorite foods  1/12/2025 1050 by Marsha ZUNIGA  BEATRICE Bowen  Outcome: Progressing  Goal: Promote skin healing  1/12/2025 1051 by Marsha Bowen RN  Flowsheets (Taken 1/12/2025 1051)  Promote skin healing:   Assess skin/pad under line(s)/device(s)   Protective dressings over bony prominences   Turn/reposition every 2 hours/use positioning/transfer devices   Ensure correct size (line/device) and apply per  instructions   Rotate device position/do not position patient on device  1/12/2025 1050 by Marsha Bowen RN  Outcome: Progressing     Problem: Fall/Injury  Goal: Not fall by end of shift  Outcome: Progressing  Goal: Be free from injury by end of the shift  Outcome: Progressing  Goal: Verbalize understanding of personal risk factors for fall in the hospital  Outcome: Progressing  Goal: Verbalize understanding of risk factor reduction measures to prevent injury from fall in the home  Outcome: Progressing  Goal: Use assistive devices by end of the shift  Outcome: Progressing  Goal: Pace activities to prevent fatigue by end of the shift  Outcome: Progressing     Problem: Nutrition  Goal: Oral intake greater 75%  Outcome: Progressing  Goal: Consume prescribed supplement  Outcome: Progressing  Goal: Promote healing  Outcome: Progressing     Problem: Dialysis  Goal: I will slow progression of end-stage renal disease through participating in dialysis 3 times a week  Outcome: Progressing     Problem: Respiratory  Goal: Minimal/no exertional discomfort or dyspnea this shift  Outcome: Progressing  Goal: No signs of respiratory distress (eg. Use of accessory muscles. Peds grunting)  Outcome: Progressing  Goal: Patent airway maintained this shift  Outcome: Progressing  Goal: Verbalize decreased shortness of breath this shift  Outcome: Progressing  Goal: Wean oxygen to maintain O2 saturation per order/standard this shift  Outcome: Progressing     Problem: Bathing  Goal: STG - Patient will bathe upper body with minimal assist  Outcome:  Progressing     Problem: Dressing Upper Extremities  Goal: LTG - Patient will complete upper body dressing with set up  Outcome: Progressing     Problem: Grooming  Goal: STG - Patient completes grooming with set up  Outcome: Progressing     Problem: Toileting  Goal: STG - Patient will complete toileting tasks with 2ww and minimal assist  Outcome: Progressing     Problem: Pain - Adult  Goal: Verbalizes/displays adequate comfort level or baseline comfort level  Outcome: Progressing     Problem: Safety - Adult  Goal: Free from fall injury  Outcome: Progressing     Problem: Discharge Planning  Goal: Discharge to home or other facility with appropriate resources  Outcome: Progressing     Problem: Chronic Conditions and Co-morbidities  Goal: Patient's chronic conditions and co-morbidity symptoms are monitored and maintained or improved  Outcome: Progressing     Problem: Diabetes  Goal: Achieve decreasing blood glucose levels by end of shift  Outcome: Progressing  Goal: Increase stability of blood glucose readings by end of shift  Outcome: Progressing  Goal: Decrease in ketones present in urine by end of shift  Outcome: Progressing  Goal: Maintain electrolyte levels within acceptable range throughout shift  Outcome: Progressing  Goal: Maintain glucose levels >70mg/dl to <250mg/dl throughout shift  Outcome: Progressing  Goal: No changes in neurological exam by end of shift  Outcome: Progressing  Goal: Learn about and adhere to nutrition recommendations by end of shift  Outcome: Progressing  Goal: Vital signs within normal range for age by end of shift  Outcome: Progressing  Goal: Increase self care and/or family involovement by end of shift  Outcome: Progressing  Goal: Receive DSME education by end of shift  Outcome: Progressing   The patient's goals for the shift include      The clinical goals for the shift include Remain hemodynamically stable

## 2025-01-12 NOTE — ASSESSMENT & PLAN NOTE
Consult nephrology (Dr. Mitchell)  Respiratory acidosis with acute on chronic hypoxic respiratory failure  Much improved has been transferred out of ICU  Metabolic encephalopathy  Much improved, appears to be at baseline  Hypertension  Continue home amlodipine  Atrial fibrillation  Not on anticoagulation  Hyperlipidemia  Continue home atorvastatin  Hypothyroid  Continue home levothyroxine  Chronic diastolic heart failure  Bradycardia  Patient was on metoprolol succinate 50 mg twice daily and was quite bradycardic  Will change to 50 mg once daily with parameters to hold for heart rate less than 60 or systolic blood pressure less than 100  Type 2 diabetes mellitus  Hypoglycemia protocol  Accu-Cheks before meals and at bedtime  Insulin sliding scale  Hemoglobin A1c 8.1 on January 1, 2025  Disposition  Patient is much improved.  Pending recommendations from vascular surgery and nephrology  Will likely need SNF for rehab

## 2025-01-12 NOTE — CARE PLAN
The clinical goals for the shift include Remain hemodynamically stable    Over the shift, the patient did make progress toward the following goals.       Problem: Skin  Goal: Decreased wound size/increased tissue granulation at next dressing change  Outcome: Progressing  Flowsheets (Taken 1/11/2025 2315)  Decreased wound size/increased tissue granulation at next dressing change:   Promote sleep for wound healing   Protective dressings over bony prominences  Goal: Participates in plan/prevention/treatment measures  Outcome: Progressing  Flowsheets (Taken 1/11/2025 2315)  Participates in plan/prevention/treatment measures:   Elevate heels   Increase activity/out of bed for meals  Goal: Prevent/manage excess moisture  Outcome: Progressing  Flowsheets (Taken 1/11/2025 2315)  Prevent/manage excess moisture:   Cleanse incontinence/protect with barrier cream   Moisturize dry skin   Follow provider orders for dressing changes   Monitor for/manage infection if present  Goal: Prevent/minimize sheer/friction injuries  Outcome: Progressing  Flowsheets (Taken 1/11/2025 2315)  Prevent/minimize sheer/friction injuries:   Complete micro-shifts as needed if patient unable. Adjust patient position to relieve pressure points, not a full turn   Increase activity/out of bed for meals   Use pull sheet   HOB 30 degrees or less   Turn/reposition every 2 hours/use positioning/transfer devices  Goal: Promote/optimize nutrition  Outcome: Progressing  Flowsheets (Taken 1/11/2025 2315)  Promote/optimize nutrition:   Assist with feeding   Consume > 50% meals/supplements   Monitor/record intake including meals   Offer water/supplements/favorite foods  Goal: Promote skin healing  Outcome: Progressing  Flowsheets (Taken 1/11/2025 2315)  Promote skin healing:   Assess skin/pad under line(s)/device(s)   Protective dressings over bony prominences   Turn/reposition every 2 hours/use positioning/transfer devices   Ensure correct size (line/device) and  apply per  instructions   Rotate device position/do not position patient on device     Problem: Pain - Adult  Goal: Verbalizes/displays adequate comfort level or baseline comfort level  Outcome: Progressing     Problem: Safety - Adult  Goal: Free from fall injury  Outcome: Progressing

## 2025-01-12 NOTE — NURSING NOTE
Pts PTT is 38.2  APTT 38 to 52 (THERAPEUTIC): DO NOT CHANGE Infusion Rate.   Infusion rate is still 600 units/hr. PTT will be ordered for the morning lab pull. Care ongoing.

## 2025-01-12 NOTE — NURSING NOTE
Pt states she had her cell phone charging in ICU 10. Room immediately checked by multiple people, no phone or phone  anywhere in room

## 2025-01-12 NOTE — CARE PLAN
Problem: Skin  Goal: Decreased wound size/increased tissue granulation at next dressing change  Outcome: Progressing  Goal: Participates in plan/prevention/treatment measures  Outcome: Progressing  Goal: Prevent/manage excess moisture  Outcome: Progressing  Goal: Prevent/minimize sheer/friction injuries  Outcome: Progressing  Goal: Promote/optimize nutrition  Outcome: Progressing  Goal: Promote skin healing  Outcome: Progressing     Problem: Fall/Injury  Goal: Not fall by end of shift  Outcome: Progressing  Goal: Be free from injury by end of the shift  Outcome: Progressing  Goal: Verbalize understanding of personal risk factors for fall in the hospital  Outcome: Progressing  Goal: Verbalize understanding of risk factor reduction measures to prevent injury from fall in the home  Outcome: Progressing  Goal: Use assistive devices by end of the shift  Outcome: Progressing  Goal: Pace activities to prevent fatigue by end of the shift  Outcome: Progressing     Problem: Nutrition  Goal: Oral intake greater 75%  Outcome: Progressing  Goal: Consume prescribed supplement  Outcome: Progressing  Goal: Promote healing  Outcome: Progressing     Problem: Dialysis  Goal: I will slow progression of end-stage renal disease through participating in dialysis 3 times a week  Outcome: Progressing     Problem: Respiratory  Goal: Minimal/no exertional discomfort or dyspnea this shift  Outcome: Progressing  Goal: No signs of respiratory distress (eg. Use of accessory muscles. Peds grunting)  Outcome: Progressing  Goal: Patent airway maintained this shift  Outcome: Progressing  Goal: Verbalize decreased shortness of breath this shift  Outcome: Progressing  Goal: Wean oxygen to maintain O2 saturation per order/standard this shift  Outcome: Progressing     Problem: Bathing  Goal: STG - Patient will bathe upper body with minimal assist  Outcome: Progressing     Problem: Dressing Upper Extremities  Goal: LTG - Patient will complete upper body  dressing with set up  Outcome: Progressing     Problem: Grooming  Goal: STG - Patient completes grooming with set up  Outcome: Progressing     Problem: Toileting  Goal: STG - Patient will complete toileting tasks with 2ww and minimal assist  Outcome: Progressing     Problem: Pain - Adult  Goal: Verbalizes/displays adequate comfort level or baseline comfort level  Outcome: Progressing     Problem: Safety - Adult  Goal: Free from fall injury  Outcome: Progressing     Problem: Discharge Planning  Goal: Discharge to home or other facility with appropriate resources  Outcome: Progressing     Problem: Chronic Conditions and Co-morbidities  Goal: Patient's chronic conditions and co-morbidity symptoms are monitored and maintained or improved  Outcome: Progressing     Problem: Diabetes  Goal: Achieve decreasing blood glucose levels by end of shift  Outcome: Progressing  Goal: Increase stability of blood glucose readings by end of shift  Outcome: Progressing  Goal: Decrease in ketones present in urine by end of shift  Outcome: Progressing  Goal: Maintain electrolyte levels within acceptable range throughout shift  Outcome: Progressing  Goal: Maintain glucose levels >70mg/dl to <250mg/dl throughout shift  Outcome: Progressing  Goal: No changes in neurological exam by end of shift  Outcome: Progressing  Goal: Learn about and adhere to nutrition recommendations by end of shift  Outcome: Progressing  Goal: Vital signs within normal range for age by end of shift  Outcome: Progressing  Goal: Increase self care and/or family involovement by end of shift  Outcome: Progressing  Goal: Receive DSME education by end of shift  Outcome: Progressing   The patient's goals for the shift include      The clinical goals for the shift include Remain hemodynamically stable

## 2025-01-12 NOTE — PROGRESS NOTES
"Daphne Morris is a 69 y.o. female on day 2 of admission presenting with Clotted renal dialysis AV graft (CMS-[facility]).      Subjective   No new overnight events.  Had a UF yesterday and got 2.4 L off.  Breathing better.  Stable BP.       Scheduled medications  amiodarone, 200 mg, oral, Daily with breakfast  amLODIPine, 5 mg, oral, Daily  aspirin, 81 mg, oral, Daily  atorvastatin, 40 mg, oral, Nightly  [Held by provider] clopidogrel, 75 mg, oral, Daily  epoetin debra or biosimilar, 5,000 Units, subcutaneous, Once per day on Monday Wednesday Friday  heparin, 2,000 Units, intra-catheter, After Dialysis  heparin, 2,000 Units, intra-catheter, After Dialysis  insulin lispro, 0-5 Units, subcutaneous, TID AC  ipratropium-albuteroL, 3 mL, nebulization, q6h  levothyroxine, 200 mcg, oral, Daily  levothyroxine, 50 mcg, oral, Daily  [START ON 1/13/2025] metoprolol succinate XL, 50 mg, oral, Daily  oxygen, , inhalation, Continuous - Inhalation  [Held by provider] pantoprazole, 40 mg, oral, BID  pantoprazole, 40 mg, intravenous, BID  sodium chloride, 3 mL, nebulization, q6h SUMIT  venlafaxine XR, 150 mg, oral, Daily      Continuous medications  heparin, 0-4,000 Units/hr, Last Rate: 600 Units/hr (01/12/25 0622)      PRN medications  PRN medications: acetaminophen **OR** acetaminophen **OR** acetaminophen, albuterol, benzocaine-menthol, bisacodyl, dextromethorphan-guaifenesin, dextrose, dextrose, glucagon, glucagon, guaiFENesin, heparin (porcine), melatonin, polyethylene glycol, prochlorperazine **OR** prochlorperazine **OR** prochlorperazine      Objective     Vitals 24HR  Heart Rate:  [61-88]   Temp:  [36.1 °C (97 °F)-36.7 °C (98.1 °F)]   Resp:  [15-24]   BP: (118-148)/(51-98)   Height:  [167.6 cm (5' 6\")]   Weight:  [70 kg (154 lb 5.2 oz)-71 kg (156 lb 8.4 oz)]   SpO2:  [75 %-100 %]     General: Elderly woman, not in acute distress  Access: R IJ trialysis catheter, RUE AVG with absent bruit  Lungs: Coarse BS  Heart: S1 and S2, " regular  Abdomen: Soft, nontender  Extremities: No pedal edema  Neuro: Awake and interactive      Intake/Output last 3 Shifts:    Intake/Output Summary (Last 24 hours) at 1/12/2025 1059  Last data filed at 1/11/2025 1800  Gross per 24 hour   Intake 1858.26 ml   Output 2400 ml   Net -541.74 ml       Relevant Results    Results for orders placed or performed during the hospital encounter of 01/10/25 (from the past 24 hours)   POCT GLUCOSE   Result Value Ref Range    POCT Glucose 99 74 - 99 mg/dL   POCT GLUCOSE   Result Value Ref Range    POCT Glucose 145 (H) 74 - 99 mg/dL   aPTT   Result Value Ref Range    aPTT 60.2 (H) 22.0 - 32.5 seconds   POCT GLUCOSE   Result Value Ref Range    POCT Glucose 141 (H) 74 - 99 mg/dL   aPTT   Result Value Ref Range    aPTT 53.2 (H) 22.0 - 32.5 seconds   POCT GLUCOSE   Result Value Ref Range    POCT Glucose 108 (H) 74 - 99 mg/dL   POCT GLUCOSE   Result Value Ref Range    POCT Glucose 91 74 - 99 mg/dL   Calcium, Ionized   Result Value Ref Range    POCT Calcium, Ionized 1.05 (L) 1.1 - 1.33 mmol/L   Magnesium   Result Value Ref Range    Magnesium 1.95 1.60 - 2.40 mg/dL   Renal function panel   Result Value Ref Range    Glucose 84 74 - 99 mg/dL    Sodium 134 (L) 136 - 145 mmol/L    Potassium 3.5 3.5 - 5.3 mmol/L    Chloride 97 (L) 98 - 107 mmol/L    Bicarbonate 30 21 - 32 mmol/L    Anion Gap 11 10 - 20 mmol/L    Urea Nitrogen 30 (H) 6 - 23 mg/dL    Creatinine 2.93 (H) 0.50 - 1.05 mg/dL    eGFR 17 (L) >60 mL/min/1.73m*2    Calcium 7.5 (L) 8.6 - 10.3 mg/dL    Phosphorus 2.5 2.5 - 4.9 mg/dL    Albumin 2.6 (L) 3.4 - 5.0 g/dL   CBC   Result Value Ref Range    WBC 13.0 (H) 4.4 - 11.3 x10*3/uL    nRBC 0.0 0.0 - 0.0 /100 WBCs    RBC 3.09 (L) 4.00 - 5.20 x10*6/uL    Hemoglobin 9.6 (L) 12.0 - 16.0 g/dL    Hematocrit 30.0 (L) 36.0 - 46.0 %    MCV 97 80 - 100 fL    MCH 31.1 26.0 - 34.0 pg    MCHC 32.0 32.0 - 36.0 g/dL    RDW 13.4 11.5 - 14.5 %    Platelets 188 150 - 450 x10*3/uL   aPTT   Result Value  Ref Range    aPTT 35.6 (H) 22.0 - 32.5 seconds   POCT GLUCOSE   Result Value Ref Range    POCT Glucose 87 74 - 99 mg/dL     *Note: Due to a large number of results and/or encounters for the requested time period, some results have not been displayed. A complete set of results can be found in Results Review.         Assessment/Plan   ESRD on HD  AVG thrombosis  Respiratory failure  Fluid overload  Hyponatremia  Anemia in CKD, at goal    Last HD on Friday and had additional IVF yesterday.  Breathing better and I will plan for next HD tomorrow although she is typically on a TTS dialysis schedule.    AVG declot has been tentatively scheduled for tomorrow.    Continue other care.    Dr. Mitchell will resume service tomorrow.      Kevin Romeo MD

## 2025-01-12 NOTE — NURSING NOTE
Assumed care of pt. Pt came to floor in bed. Heparin moved to non-ICU pole. This RN received report from Marsha. Vitals to be obtained, care ongoing.

## 2025-01-12 NOTE — PROGRESS NOTES
Daphne Morris is a 69 y.o. female on day 2 of admission presenting with Clotted renal dialysis AV graft (CMS-HCC).      Subjective   Patient examined sitting up in bed with no new complaints.  She states she feels much better from when she was admitted to the hospital.  She denies pain at the present time.       Objective     Last Recorded Vitals  /61   Pulse 67   Temp 36.6 °C (97.9 °F) (Axillary)   Resp 21   Wt 70 kg (154 lb 5.2 oz)   SpO2 97%   Intake/Output last 3 Shifts:    Intake/Output Summary (Last 24 hours) at 1/12/2025 1006  Last data filed at 1/11/2025 1800  Gross per 24 hour   Intake 1858.26 ml   Output 2400 ml   Net -541.74 ml       Admission Weight  Weight: 70 kg (154 lb 5.2 oz) (01/11/25 0537)    Daily Weight  01/12/25 : 70 kg (154 lb 5.2 oz)          Physical Exam                 Assessment/Plan       This patient has a central line   Reason for the central line remaining today? Dialysis/Hemapheresis            Assessment & Plan  Clotted renal dialysis AV graft (CMS-HCC)  Plavix on hold  Vascular surgery following  Currently on heparin drip  Temporary dialysis line has been placed  ESRD (end stage renal disease) (Multi)  Consult nephrology (Dr. Mitchell)  Respiratory acidosis with acute on chronic hypoxic respiratory failure  Much improved has been transferred out of ICU  Metabolic encephalopathy  Much improved, appears to be at baseline  Hypertension  Continue home amlodipine  Atrial fibrillation  Not on anticoagulation  Hyperlipidemia  Continue home atorvastatin  Hypothyroid  Continue home levothyroxine  Chronic diastolic heart failure  Bradycardia  Patient was on metoprolol succinate 50 mg twice daily and was quite bradycardic  Will change to 50 mg once daily with parameters to hold for heart rate less than 60 or systolic blood pressure less than 100  Type 2 diabetes mellitus  Hypoglycemia protocol  Accu-Cheks before meals and at bedtime  Insulin sliding scale  Hemoglobin A1c 8.1 on  January 1, 2025  Disposition  Patient is much improved.  Pending recommendations from vascular surgery and nephrology  Will likely need SNF for rehab                CARLENE Saini-CNP

## 2025-01-12 NOTE — ASSESSMENT & PLAN NOTE
Plavix on hold  Vascular surgery following  Currently on heparin drip  Temporary dialysis line has been placed

## 2025-01-13 ENCOUNTER — APPOINTMENT (OUTPATIENT)
Dept: RADIOLOGY | Facility: HOSPITAL | Age: 70
DRG: 252 | End: 2025-01-13
Payer: MEDICARE

## 2025-01-13 ENCOUNTER — APPOINTMENT (OUTPATIENT)
Dept: CARDIOLOGY | Facility: HOSPITAL | Age: 70
DRG: 252 | End: 2025-01-13
Payer: MEDICARE

## 2025-01-13 ENCOUNTER — APPOINTMENT (OUTPATIENT)
Dept: DIALYSIS | Facility: HOSPITAL | Age: 70
End: 2025-01-13
Payer: MEDICARE

## 2025-01-13 LAB
ALBUMIN SERPL BCP-MCNC: 2.6 G/DL (ref 3.4–5)
ANION GAP SERPL CALCULATED.3IONS-SCNC: 10 MMOL/L (ref 10–20)
APTT PPP: 34.2 SECONDS (ref 22–32.5)
APTT PPP: 43.5 SECONDS (ref 22–32.5)
BUN SERPL-MCNC: 38 MG/DL (ref 6–23)
CA-I BLD-SCNC: 1.08 MMOL/L (ref 1.1–1.33)
CALCIUM SERPL-MCNC: 7.6 MG/DL (ref 8.6–10.3)
CHLORIDE SERPL-SCNC: 96 MMOL/L (ref 98–107)
CO2 SERPL-SCNC: 30 MMOL/L (ref 21–32)
CREAT SERPL-MCNC: 3.47 MG/DL (ref 0.5–1.05)
EGFRCR SERPLBLD CKD-EPI 2021: 14 ML/MIN/1.73M*2
GLUCOSE BLD MANUAL STRIP-MCNC: 148 MG/DL (ref 74–99)
GLUCOSE BLD MANUAL STRIP-MCNC: 92 MG/DL (ref 74–99)
GLUCOSE BLD MANUAL STRIP-MCNC: 94 MG/DL (ref 74–99)
GLUCOSE SERPL-MCNC: 83 MG/DL (ref 74–99)
MAGNESIUM SERPL-MCNC: 1.87 MG/DL (ref 1.6–2.4)
PHOSPHATE SERPL-MCNC: 3 MG/DL (ref 2.5–4.9)
POTASSIUM SERPL-SCNC: 3.7 MMOL/L (ref 3.5–5.3)
SODIUM SERPL-SCNC: 132 MMOL/L (ref 136–145)

## 2025-01-13 PROCEDURE — 94640 AIRWAY INHALATION TREATMENT: CPT

## 2025-01-13 PROCEDURE — 36904 THRMBC/NFS DIALYSIS CIRCUIT: CPT | Performed by: RADIOLOGY

## 2025-01-13 PROCEDURE — 99153 MOD SED SAME PHYS/QHP EA: CPT

## 2025-01-13 PROCEDURE — 80069 RENAL FUNCTION PANEL: CPT

## 2025-01-13 PROCEDURE — 2500000004 HC RX 250 GENERAL PHARMACY W/ HCPCS (ALT 636 FOR OP/ED)

## 2025-01-13 PROCEDURE — 36906 THRMBC/NFS DIALYSIS CIRCUIT: CPT | Mod: RIGHT SIDE | Performed by: RADIOLOGY

## 2025-01-13 PROCEDURE — 76942 ECHO GUIDE FOR BIOPSY: CPT

## 2025-01-13 PROCEDURE — 2500000002 HC RX 250 W HCPCS SELF ADMINISTERED DRUGS (ALT 637 FOR MEDICARE OP, ALT 636 FOR OP/ED): Performed by: HOSPITALIST

## 2025-01-13 PROCEDURE — 99232 SBSQ HOSP IP/OBS MODERATE 35: CPT | Performed by: NURSE PRACTITIONER

## 2025-01-13 PROCEDURE — C1887 CATHETER, GUIDING: HCPCS

## 2025-01-13 PROCEDURE — 2500000001 HC RX 250 WO HCPCS SELF ADMINISTERED DRUGS (ALT 637 FOR MEDICARE OP)

## 2025-01-13 PROCEDURE — 2780000003 HC OR 278 NO HCPCS

## 2025-01-13 PROCEDURE — 03753DZ DILATION OF RIGHT AXILLARY ARTERY WITH INTRALUMINAL DEVICE, PERCUTANEOUS APPROACH: ICD-10-PCS | Performed by: RADIOLOGY

## 2025-01-13 PROCEDURE — 6350000001 HC RX 635 EPOETIN >10,000 UNITS

## 2025-01-13 PROCEDURE — 83735 ASSAY OF MAGNESIUM: CPT

## 2025-01-13 PROCEDURE — 94668 MNPJ CHEST WALL SBSQ: CPT

## 2025-01-13 PROCEDURE — 76937 US GUIDE VASCULAR ACCESS: CPT

## 2025-01-13 PROCEDURE — C1757 CATH, THROMBECTOMY/EMBOLECT: HCPCS

## 2025-01-13 PROCEDURE — 85730 THROMBOPLASTIN TIME PARTIAL: CPT

## 2025-01-13 PROCEDURE — C1894 INTRO/SHEATH, NON-LASER: HCPCS

## 2025-01-13 PROCEDURE — 36906 THRMBC/NFS DIALYSIS CIRCUIT: CPT

## 2025-01-13 PROCEDURE — 2720000007 HC OR 272 NO HCPCS

## 2025-01-13 PROCEDURE — 85730 THROMBOPLASTIN TIME PARTIAL: CPT | Performed by: HOSPITALIST

## 2025-01-13 PROCEDURE — 36415 COLL VENOUS BLD VENIPUNCTURE: CPT

## 2025-01-13 PROCEDURE — 03C53ZZ EXTIRPATION OF MATTER FROM RIGHT AXILLARY ARTERY, PERCUTANEOUS APPROACH: ICD-10-PCS | Performed by: RADIOLOGY

## 2025-01-13 PROCEDURE — 2500000005 HC RX 250 GENERAL PHARMACY W/O HCPCS: Performed by: RADIOLOGY

## 2025-01-13 PROCEDURE — 1200000002 HC GENERAL ROOM WITH TELEMETRY DAILY

## 2025-01-13 PROCEDURE — 8010000001 HC DIALYSIS - HEMODIALYSIS PER DAY

## 2025-01-13 PROCEDURE — 2500000005 HC RX 250 GENERAL PHARMACY W/O HCPCS

## 2025-01-13 PROCEDURE — 2500000004 HC RX 250 GENERAL PHARMACY W/ HCPCS (ALT 636 FOR OP/ED): Performed by: RADIOLOGY

## 2025-01-13 PROCEDURE — C1725 CATH, TRANSLUMIN NON-LASER: HCPCS

## 2025-01-13 PROCEDURE — C1769 GUIDE WIRE: HCPCS

## 2025-01-13 PROCEDURE — 2550000001 HC RX 255 CONTRASTS: Performed by: RADIOLOGY

## 2025-01-13 PROCEDURE — 2500000005 HC RX 250 GENERAL PHARMACY W/O HCPCS: Performed by: HOSPITALIST

## 2025-01-13 PROCEDURE — 2500000002 HC RX 250 W HCPCS SELF ADMINISTERED DRUGS (ALT 637 FOR MEDICARE OP, ALT 636 FOR OP/ED)

## 2025-01-13 PROCEDURE — 82947 ASSAY GLUCOSE BLOOD QUANT: CPT

## 2025-01-13 PROCEDURE — C1874 STENT, COATED/COV W/DEL SYS: HCPCS

## 2025-01-13 PROCEDURE — 82330 ASSAY OF CALCIUM: CPT

## 2025-01-13 PROCEDURE — 99152 MOD SED SAME PHYS/QHP 5/>YRS: CPT

## 2025-01-13 RX ORDER — ASPIRIN 81 MG/1
81 TABLET ORAL DAILY
Status: DISCONTINUED | OUTPATIENT
Start: 2025-01-13 | End: 2025-01-21 | Stop reason: HOSPADM

## 2025-01-13 RX ORDER — VENLAFAXINE HYDROCHLORIDE 150 MG/1
150 CAPSULE, EXTENDED RELEASE ORAL DAILY
Status: DISCONTINUED | OUTPATIENT
Start: 2025-01-13 | End: 2025-01-21 | Stop reason: HOSPADM

## 2025-01-13 RX ORDER — HEPARIN SODIUM 1000 [USP'U]/ML
INJECTION, SOLUTION INTRAVENOUS; SUBCUTANEOUS AS NEEDED
Status: DISCONTINUED | OUTPATIENT
Start: 2025-01-13 | End: 2025-01-13 | Stop reason: HOSPADM

## 2025-01-13 RX ORDER — METOPROLOL SUCCINATE 50 MG/1
50 TABLET, EXTENDED RELEASE ORAL DAILY
Status: DISCONTINUED | OUTPATIENT
Start: 2025-01-13 | End: 2025-01-17

## 2025-01-13 RX ORDER — AMIODARONE HYDROCHLORIDE 200 MG/1
200 TABLET ORAL
Status: DISCONTINUED | OUTPATIENT
Start: 2025-01-13 | End: 2025-01-21 | Stop reason: HOSPADM

## 2025-01-13 RX ORDER — HEPARIN SODIUM 5000 [USP'U]/ML
3000 INJECTION, SOLUTION INTRAVENOUS; SUBCUTANEOUS ONCE
Status: COMPLETED | OUTPATIENT
Start: 2025-01-13 | End: 2025-01-13

## 2025-01-13 RX ORDER — MIDAZOLAM HYDROCHLORIDE 1 MG/ML
INJECTION, SOLUTION INTRAMUSCULAR; INTRAVENOUS AS NEEDED
Status: DISCONTINUED | OUTPATIENT
Start: 2025-01-13 | End: 2025-01-13 | Stop reason: HOSPADM

## 2025-01-13 RX ORDER — IODIXANOL 320 MG/ML
INJECTION, SOLUTION INTRAVASCULAR AS NEEDED
Status: DISCONTINUED | OUTPATIENT
Start: 2025-01-13 | End: 2025-01-13 | Stop reason: HOSPADM

## 2025-01-13 RX ORDER — PANTOPRAZOLE SODIUM 40 MG/10ML
40 INJECTION, POWDER, LYOPHILIZED, FOR SOLUTION INTRAVENOUS 2 TIMES DAILY
Status: DISCONTINUED | OUTPATIENT
Start: 2025-01-13 | End: 2025-01-21 | Stop reason: HOSPADM

## 2025-01-13 RX ORDER — AMLODIPINE BESYLATE 5 MG/1
5 TABLET ORAL DAILY
Status: DISCONTINUED | OUTPATIENT
Start: 2025-01-13 | End: 2025-01-21 | Stop reason: HOSPADM

## 2025-01-13 RX ORDER — FENTANYL CITRATE 50 UG/ML
INJECTION, SOLUTION INTRAMUSCULAR; INTRAVENOUS AS NEEDED
Status: DISCONTINUED | OUTPATIENT
Start: 2025-01-13 | End: 2025-01-13 | Stop reason: HOSPADM

## 2025-01-13 RX ADMIN — HEPARIN SODIUM 800 UNITS/HR: 10000 INJECTION, SOLUTION INTRAVENOUS at 21:17

## 2025-01-13 RX ADMIN — FENTANYL CITRATE 50 MCG: 0.05 INJECTION, SOLUTION INTRAMUSCULAR; INTRAVENOUS at 14:55

## 2025-01-13 RX ADMIN — EPOETIN ALFA-EPBX 5000 UNITS: 10000 INJECTION, SOLUTION INTRAVENOUS; SUBCUTANEOUS at 18:18

## 2025-01-13 RX ADMIN — HEPARIN SODIUM 600 UNITS/HR: 10000 INJECTION, SOLUTION INTRAVENOUS at 04:04

## 2025-01-13 RX ADMIN — Medication 5 L/MIN: at 14:52

## 2025-01-13 RX ADMIN — HEPARIN SODIUM 3500 UNITS: 1000 INJECTION INTRAVENOUS; SUBCUTANEOUS at 14:56

## 2025-01-13 RX ADMIN — MIDAZOLAM 1 MG: 1 INJECTION INTRAMUSCULAR; INTRAVENOUS at 14:55

## 2025-01-13 RX ADMIN — Medication 3 ML: at 08:04

## 2025-01-13 RX ADMIN — ATORVASTATIN CALCIUM 40 MG: 40 TABLET, FILM COATED ORAL at 21:16

## 2025-01-13 RX ADMIN — ALTEPLASE 4 MG: 2.2 INJECTION, POWDER, LYOPHILIZED, FOR SOLUTION INTRAVENOUS at 12:07

## 2025-01-13 RX ADMIN — HEPARIN SODIUM 1200 UNITS: 1000 INJECTION, SOLUTION INTRAVENOUS; SUBCUTANEOUS at 11:39

## 2025-01-13 RX ADMIN — LEVOTHYROXINE SODIUM 50 MCG: 0.05 TABLET ORAL at 05:02

## 2025-01-13 RX ADMIN — FENTANYL CITRATE 25 MCG: 0.05 INJECTION, SOLUTION INTRAMUSCULAR; INTRAVENOUS at 16:13

## 2025-01-13 RX ADMIN — MIDAZOLAM 0.5 MG: 1 INJECTION INTRAMUSCULAR; INTRAVENOUS at 14:55

## 2025-01-13 RX ADMIN — HEPARIN SODIUM 3000 UNITS: 5000 INJECTION, SOLUTION INTRAVENOUS; SUBCUTANEOUS at 12:10

## 2025-01-13 RX ADMIN — GUAIFENESIN AND DEXTROMETHORPHAN 5 ML: 10; 100 SYRUP ORAL at 04:51

## 2025-01-13 RX ADMIN — Medication 4 L/MIN: at 08:04

## 2025-01-13 RX ADMIN — Medication 4 L/MIN: at 19:23

## 2025-01-13 RX ADMIN — IPRATROPIUM BROMIDE AND ALBUTEROL SULFATE 3 ML: 2.5; .5 SOLUTION RESPIRATORY (INHALATION) at 08:04

## 2025-01-13 RX ADMIN — FENTANYL CITRATE 25 MCG: 0.05 INJECTION, SOLUTION INTRAMUSCULAR; INTRAVENOUS at 16:25

## 2025-01-13 RX ADMIN — MIDAZOLAM 0.5 MG: 1 INJECTION INTRAMUSCULAR; INTRAVENOUS at 16:14

## 2025-01-13 RX ADMIN — IODIXANOL 50 ML: 320 INJECTION, SOLUTION INTRAVASCULAR at 16:26

## 2025-01-13 RX ADMIN — Medication 3 ML: at 19:21

## 2025-01-13 RX ADMIN — LEVOTHYROXINE SODIUM 200 MCG: 0.1 TABLET ORAL at 05:02

## 2025-01-13 RX ADMIN — IPRATROPIUM BROMIDE AND ALBUTEROL SULFATE 3 ML: 2.5; .5 SOLUTION RESPIRATORY (INHALATION) at 19:21

## 2025-01-13 RX ADMIN — PANTOPRAZOLE SODIUM 40 MG: 40 INJECTION, POWDER, FOR SOLUTION INTRAVENOUS at 21:16

## 2025-01-13 ASSESSMENT — PAIN - FUNCTIONAL ASSESSMENT
PAIN_FUNCTIONAL_ASSESSMENT: 0-10

## 2025-01-13 ASSESSMENT — COGNITIVE AND FUNCTIONAL STATUS - GENERAL
MOVING TO AND FROM BED TO CHAIR: TOTAL
TOILETING: A LITTLE
DAILY ACTIVITIY SCORE: 17
WALKING IN HOSPITAL ROOM: TOTAL
CLIMB 3 TO 5 STEPS WITH RAILING: TOTAL
TURNING FROM BACK TO SIDE WHILE IN FLAT BAD: A LITTLE
MOBILITY SCORE: 10
DRESSING REGULAR LOWER BODY CLOTHING: A LOT
MOBILITY SCORE: 10
STANDING UP FROM CHAIR USING ARMS: TOTAL
WALKING IN HOSPITAL ROOM: TOTAL
TURNING FROM BACK TO SIDE WHILE IN FLAT BAD: A LITTLE
DRESSING REGULAR LOWER BODY CLOTHING: A LOT
DAILY ACTIVITIY SCORE: 17
PERSONAL GROOMING: A LITTLE
CLIMB 3 TO 5 STEPS WITH RAILING: TOTAL
HELP NEEDED FOR BATHING: A LOT
TOILETING: A LITTLE
HELP NEEDED FOR BATHING: A LOT
MOVING TO AND FROM BED TO CHAIR: TOTAL
MOVING FROM LYING ON BACK TO SITTING ON SIDE OF FLAT BED WITH BEDRAILS: A LITTLE
PERSONAL GROOMING: A LITTLE
DRESSING REGULAR UPPER BODY CLOTHING: A LITTLE
MOVING FROM LYING ON BACK TO SITTING ON SIDE OF FLAT BED WITH BEDRAILS: A LITTLE
DRESSING REGULAR UPPER BODY CLOTHING: A LITTLE
STANDING UP FROM CHAIR USING ARMS: TOTAL

## 2025-01-13 ASSESSMENT — PAIN SCALES - GENERAL
PAINLEVEL_OUTOF10: 0 - NO PAIN

## 2025-01-13 NOTE — NURSING NOTE
Pt sitting in bed, provided miguel angel crackers per request, denies further needs. VSS as documented. Bed low, call light in reach. Continuing to monitor

## 2025-01-13 NOTE — PROGRESS NOTES
Pt off floor for dialysis. Pt appears to have been recently hospitalized and discharged to [facility] for skilled rehab, [facility] sent message to facility to make sure they can accept the pt back with new precert if the pt chooses them again, await response.     Care transitions will follow up with pt to make sure she would like to return to the rehab facility she admitted from.      01/13/25 0738   Discharge Planning   Expected Discharge Disposition SNF

## 2025-01-13 NOTE — PROGRESS NOTES
Occupational Therapy                 Therapy Communication Note    Patient Name: Daphne oMrris  MRN: [id number]  Department: [facility] DIALYSIS  Room: [address] [place] [address]-A  Today's Date: 1/13/2025     Discipline: Occupational Therapy    OT Missed Visit: Yes     Missed Visit Reason: Missed Visit Reason: Other (Comment) (Pt with current aPTT of 43.5, not appropriate to participate in OT interventions at this time.)    Missed Time: Cancel at 0916    Comment:

## 2025-01-13 NOTE — NURSING NOTE
aPTT 43.5 no change to heparin gtt rate  APTT 38 to 52 (THERAPEUTIC): DO NOT CHANGE Infusion Rate

## 2025-01-13 NOTE — PRE-PROCEDURE NOTE
Report from Sending RN:    Report From: BEATRICE Mckeon  Recent Surgery of Procedure: Yes, scheduled for AVG declot this afternoon  Baseline Level of Consciousness (LOC): A&Ox4  Oxygen Use: Yes, 4L  Type: n/c  Diabetic: Yes  Last BP Med Given Day of Dialysis: see EMAR  Last Pain Med Given: see EMAR  Lab Tests to be Obtained with Dialysis: No  Blood Transfusion to be Given During Dialysis: No  Available IV Access: Yes  Medications to be Administered During Dialysis: No  Continuous IV Infusion Running: Yes, heparin drip  Restraints on Currently or in the Last 24 Hours: No  Hand-Off Communication: stable and ready for dialysis in HD room  Dialysis Catheter Dressing: will assess when patient arrives  Last Dressing Change: will assess when patient arrives

## 2025-01-13 NOTE — NURSING NOTE
Pt drowsy from medication/procedure.  Heparin was stopped in JAYME Garcia CNP on floor and made aware and told this RN she would reach out to vascular team about wether or not heparin gtt is to be DC'd. Pt VSS, . Bed alarm on.

## 2025-01-13 NOTE — POST-PROCEDURE NOTE
Report to Receiving RN:    Report To: BEATRICE Mckeon  Time Report Called: 1130  Hand-Off Communication: Tolerated dialysis without difficulty, removed 2L. Last /63 HR 71. Catheter ports heparinized per MAR, capped securely. Patient in transport to [facility] via transport team.  Complications During Treatment: No  Ultrafiltration Treatment: No  Medications Administered During Dialysis: No  Blood Products Administered During Dialysis: No  Labs Sent During Dialysis: No  Heparin Drip Rate Changes: No  Dialysis Catheter Dressing: clean, dry and intact  Last Dressing Change: 1/10/25    Electronic Signatures:  Ramila HARDIN

## 2025-01-13 NOTE — PROGRESS NOTES
Physical Therapy                 Therapy Communication Note    Patient Name: Daphne Morris  MRN: [id number]  Department: [facility] DIALYSIS  Room: 456/[facility]-A  Today's Date: 1/13/2025     Discipline: Physical Therapy          Missed Visit Reason: Missed Visit Reason: Other (Comment) (Pt with current aPTT of 43.5, not appropriate to participate in PT interventions at this time.)    Missed Time: Cancel    Comment:

## 2025-01-13 NOTE — CARE PLAN
The patient's goals for the shift include      The clinical goals for the shift include maintain SpO2 > 92%    Over the shift, the patient did not make progress toward the following goals. Barriers to progression include pulse ox maintains >92% on 3l NC throughout shift, however pt has persistent cough and congestion. Recommendations to address these barriers include cough and deep breathe, IS, medications as ordered.

## 2025-01-13 NOTE — PROGRESS NOTES
I came by to evaluate this woman but she was already in the interventional radiology suite.  She underwent hemodialysis today without remark.  I will see her tomorrow.

## 2025-01-13 NOTE — NURSING NOTE
Osrolya, CNP advised this RN to restart heparin gtt. Will obtain a new PTT and restart once resulted.

## 2025-01-13 NOTE — ASSESSMENT & PLAN NOTE
Consult nephrology (Dr. Mitchell)  Dialysis days are Tuesday, Thursday, and Saturday    Respiratory acidosis with acute on chronic hypoxic respiratory failure  Much improved has been transferred out of ICU  Patient at baseline on 3 L of nasal cannula    Metabolic encephalopathy  Status post IR procedure patient is somewhat lethargic  Monitor closely    Hypertension  Continue home amlodipine    Atrial fibrillation  Not on anticoagulation    Hyperlipidemia  Continue home atorvastatin    Hypothyroid  Continue home levothyroxine    Chronic diastolic heart failure  Bradycardia  Patient was on metoprolol succinate 50 mg twice daily and was quite bradycardic  Will change to 50 mg once daily with parameters to hold for heart rate less than 60 or systolic blood pressure less than 100    Type 2 diabetes mellitus  Hypoglycemia protocol  Accu-Cheks before meals and at bedtime  Insulin sliding scale  Hemoglobin A1c 8.1 on January 1, 2025    Disposition    Pending recommendations from vascular surgery and nephrology  Will likely need SNF for rehab

## 2025-01-13 NOTE — ASSESSMENT & PLAN NOTE
Plavix on hold  Vascular surgery following  Temporary dialysis line has been placed  IR today for thrombectomy and stent placement-> reached out to IR and IR recommending to resume heparin drip with Eliquis bridge for about a month

## 2025-01-13 NOTE — NURSING NOTE
Assumed care of this pt. Pt is sitting in bed, breathing even and unlabored on 4L NC. NAD. BSSR completed. Bed low, call light and belongings in reach. Continuing to monitor

## 2025-01-13 NOTE — NURSING NOTE
Heparin gtt noted running at 600units/hr. Next aPTT with morning lab draws as last aPTT was therapeutic

## 2025-01-13 NOTE — NURSING NOTE
Bedside report complete and assumed care. Pt is alert, laying in bed w/ no current needs expressed. Continues on supplemental O2 which is currently at 3L. Heparin gtt infusing at 600 units/hr through Left PIV. HD cath to R neck c/d/I. Call light in reach, bed alarm on for safety.

## 2025-01-13 NOTE — NURSING NOTE
Message sent to Mariela Martin CNP regarding heparin gtt who replied that FELICIA Garcia was looking into matter. Will await orders.

## 2025-01-13 NOTE — PROGRESS NOTES
Daphne Morris is a 69 y.o. female on day 3 of admission presenting with Clotted renal dialysis AV graft (CMS-[facility]).      Subjective   Patient seen and examined.  Patient just returned from IR after right breath thrombectomy and stent placement.  Patient is still somewhat lethargic but denies any shortness of breath or pain.      Objective     Last Recorded Vitals  /50 (BP Location: Left arm, Patient Position: Lying)   Pulse 68   Temp 36.2 °C (97.2 °F) (Temporal)   Resp 18   Wt 70 kg (154 lb 5.2 oz)   SpO2 99%   Intake/Output last 3 Shifts:    Intake/Output Summary (Last 24 hours) at 1/13/2025 1711  Last data filed at 1/13/2025 1626  Gross per 24 hour   Intake 1200 ml   Output 4410 ml   Net -3210 ml       Admission Weight  Weight: 70 kg (154 lb 5.2 oz) (01/11/25 0537)    Daily Weight  01/12/25 : 70 kg (154 lb 5.2 oz)          Physical Exam  Vitals reviewed.   Constitutional:       Appearance: She is ill-appearing.   HENT:      Head: Normocephalic.      Nose: Nose normal.   Eyes:      Extraocular Movements: Extraocular movements intact.   Cardiovascular:      Rate and Rhythm: Regular rhythm.   Pulmonary:      Effort: Pulmonary effort is normal.   Abdominal:      General: Bowel sounds are normal.   Musculoskeletal:      Cervical back: Neck supple.      Comments: Right UE dialysis access    Skin:     General: Skin is warm.   Neurological:      Mental Status: She is alert.      Comments: S/p IR procedure, patient is lethargic          Assessment/Plan       This patient has a central line   Reason for the central line remaining today? Dialysis/Hemapheresis    Daphne Morris is a 69 year old female patient with pmhx of ESRD, anemia, afib, chf, copd, CVA, depression dm, chronic hypoxic respiratory failure on 3L O2 at home, gerd hyperlipidemia, hypertension, hypothyroid, renal cell carcinoma s/p resection in 8/21 and vascultitis. Patient was admitted 1/9 at [facility] for clotted AV fistula and transferred to [facility]  to be seen by vascular surgery. Patient on arrival was lethargic and not responding. ABG was ordered showing hypercapnic respiratory acidosis with hypoxemia, patient was placed on bipap and transferred to ICU. Labs on admission also concerning for hyponatremia, hypoglycemia.     Assessment & Plan  Clotted renal dialysis AV graft (CMS-HCC)  Plavix on hold  Vascular surgery following  Temporary dialysis line has been placed  IR today for thrombectomy and stent placement-> reached out to IR and IR recommending to resume heparin drip with Eliquis bridge for about a month  ESRD (end stage renal disease) (Multi)  Consult nephrology (Dr. Mitchell)  Dialysis days are Tuesday, Thursday, and Saturday    Respiratory acidosis with acute on chronic hypoxic respiratory failure  Much improved has been transferred out of ICU  Patient at baseline on 3 L of nasal cannula    Metabolic encephalopathy  Status post IR procedure patient is somewhat lethargic  Monitor closely    Hypertension  Continue home amlodipine    Atrial fibrillation  Not on anticoagulation    Hyperlipidemia  Continue home atorvastatin    Hypothyroid  Continue home levothyroxine    Chronic diastolic heart failure  Bradycardia  Patient was on metoprolol succinate 50 mg twice daily and was quite bradycardic  Will change to 50 mg once daily with parameters to hold for heart rate less than 60 or systolic blood pressure less than 100    Type 2 diabetes mellitus  Hypoglycemia protocol  Accu-Cheks before meals and at bedtime  Insulin sliding scale  Hemoglobin A1c 8.1 on January 1, 2025    Disposition    Pending recommendations from vascular surgery and nephrology  Will likely need SNF for rehab          Jose Cisneros, APRN-CNP

## 2025-01-14 LAB
ANION GAP SERPL CALCULATED.3IONS-SCNC: 8 MMOL/L (ref 10–20)
APTT PPP: 48.1 SECONDS (ref 22–32.5)
APTT PPP: 55.3 SECONDS (ref 22–32.5)
APTT PPP: 64.1 SECONDS (ref 22–32.5)
BASOPHILS # BLD AUTO: 0.02 X10*3/UL (ref 0–0.1)
BASOPHILS NFR BLD AUTO: 0.2 %
BUN SERPL-MCNC: 19 MG/DL (ref 6–23)
CALCIUM SERPL-MCNC: 7.8 MG/DL (ref 8.6–10.3)
CHLORIDE SERPL-SCNC: 100 MMOL/L (ref 98–107)
CO2 SERPL-SCNC: 32 MMOL/L (ref 21–32)
CREAT SERPL-MCNC: 2.39 MG/DL (ref 0.5–1.05)
EGFRCR SERPLBLD CKD-EPI 2021: 21 ML/MIN/1.73M*2
EOSINOPHIL # BLD AUTO: 0.03 X10*3/UL (ref 0–0.7)
EOSINOPHIL NFR BLD AUTO: 0.3 %
ERYTHROCYTE [DISTWIDTH] IN BLOOD BY AUTOMATED COUNT: 13.6 % (ref 11.5–14.5)
GLUCOSE BLD MANUAL STRIP-MCNC: 107 MG/DL (ref 74–99)
GLUCOSE BLD MANUAL STRIP-MCNC: 127 MG/DL (ref 74–99)
GLUCOSE BLD MANUAL STRIP-MCNC: 199 MG/DL (ref 74–99)
GLUCOSE BLD MANUAL STRIP-MCNC: 302 MG/DL (ref 74–99)
GLUCOSE BLD MANUAL STRIP-MCNC: 60 MG/DL (ref 74–99)
GLUCOSE BLD MANUAL STRIP-MCNC: 69 MG/DL (ref 74–99)
GLUCOSE SERPL-MCNC: 54 MG/DL (ref 74–99)
HCT VFR BLD AUTO: 27.4 % (ref 36–46)
HGB BLD-MCNC: 8.6 G/DL (ref 12–16)
HOLD SPECIMEN: NORMAL
HOLD SPECIMEN: NORMAL
IMM GRANULOCYTES # BLD AUTO: 0.06 X10*3/UL (ref 0–0.7)
IMM GRANULOCYTES NFR BLD AUTO: 0.6 % (ref 0–0.9)
LYMPHOCYTES # BLD AUTO: 0.68 X10*3/UL (ref 1.2–4.8)
LYMPHOCYTES NFR BLD AUTO: 6.3 %
MCH RBC QN AUTO: 31.4 PG (ref 26–34)
MCHC RBC AUTO-ENTMCNC: 31.4 G/DL (ref 32–36)
MCV RBC AUTO: 100 FL (ref 80–100)
MONOCYTES # BLD AUTO: 0.64 X10*3/UL (ref 0.1–1)
MONOCYTES NFR BLD AUTO: 6 %
NEUTROPHILS # BLD AUTO: 9.28 X10*3/UL (ref 1.2–7.7)
NEUTROPHILS NFR BLD AUTO: 86.6 %
NRBC BLD-RTO: 0 /100 WBCS (ref 0–0)
PLATELET # BLD AUTO: 157 X10*3/UL (ref 150–450)
POTASSIUM SERPL-SCNC: 3.9 MMOL/L (ref 3.5–5.3)
RBC # BLD AUTO: 2.74 X10*6/UL (ref 4–5.2)
SODIUM SERPL-SCNC: 136 MMOL/L (ref 136–145)
WBC # BLD AUTO: 10.7 X10*3/UL (ref 4.4–11.3)

## 2025-01-14 PROCEDURE — 94640 AIRWAY INHALATION TREATMENT: CPT

## 2025-01-14 PROCEDURE — 36415 COLL VENOUS BLD VENIPUNCTURE: CPT

## 2025-01-14 PROCEDURE — 2500000005 HC RX 250 GENERAL PHARMACY W/O HCPCS: Performed by: HOSPITALIST

## 2025-01-14 PROCEDURE — 97116 GAIT TRAINING THERAPY: CPT | Mod: GP

## 2025-01-14 PROCEDURE — 94668 MNPJ CHEST WALL SBSQ: CPT

## 2025-01-14 PROCEDURE — 2500000005 HC RX 250 GENERAL PHARMACY W/O HCPCS

## 2025-01-14 PROCEDURE — 2500000002 HC RX 250 W HCPCS SELF ADMINISTERED DRUGS (ALT 637 FOR MEDICARE OP, ALT 636 FOR OP/ED)

## 2025-01-14 PROCEDURE — 2500000004 HC RX 250 GENERAL PHARMACY W/ HCPCS (ALT 636 FOR OP/ED)

## 2025-01-14 PROCEDURE — 94660 CPAP INITIATION&MGMT: CPT

## 2025-01-14 PROCEDURE — 99232 SBSQ HOSP IP/OBS MODERATE 35: CPT | Performed by: NURSE PRACTITIONER

## 2025-01-14 PROCEDURE — 2500000001 HC RX 250 WO HCPCS SELF ADMINISTERED DRUGS (ALT 637 FOR MEDICARE OP)

## 2025-01-14 PROCEDURE — 85025 COMPLETE CBC W/AUTO DIFF WBC: CPT | Performed by: NURSE PRACTITIONER

## 2025-01-14 PROCEDURE — 85730 THROMBOPLASTIN TIME PARTIAL: CPT

## 2025-01-14 PROCEDURE — 97535 SELF CARE MNGMENT TRAINING: CPT | Mod: GO,CO

## 2025-01-14 PROCEDURE — 82947 ASSAY GLUCOSE BLOOD QUANT: CPT

## 2025-01-14 PROCEDURE — 36591 DRAW BLOOD OFF VENOUS DEVICE: CPT | Performed by: INTERNAL MEDICINE

## 2025-01-14 PROCEDURE — 1200000002 HC GENERAL ROOM WITH TELEMETRY DAILY

## 2025-01-14 PROCEDURE — 9420000001 HC RT PATIENT EDUCATION 5 MIN

## 2025-01-14 PROCEDURE — 2500000001 HC RX 250 WO HCPCS SELF ADMINISTERED DRUGS (ALT 637 FOR MEDICARE OP): Performed by: HOSPITALIST

## 2025-01-14 PROCEDURE — 2500000002 HC RX 250 W HCPCS SELF ADMINISTERED DRUGS (ALT 637 FOR MEDICARE OP, ALT 636 FOR OP/ED): Performed by: HOSPITALIST

## 2025-01-14 PROCEDURE — 80048 BASIC METABOLIC PNL TOTAL CA: CPT | Performed by: NURSE PRACTITIONER

## 2025-01-14 RX ADMIN — ALBUTEROL SULFATE 2.5 MG: 2.5 SOLUTION RESPIRATORY (INHALATION) at 15:33

## 2025-01-14 RX ADMIN — LEVOTHYROXINE SODIUM 200 MCG: 0.1 TABLET ORAL at 05:33

## 2025-01-14 RX ADMIN — VENLAFAXINE HYDROCHLORIDE 150 MG: 150 CAPSULE, EXTENDED RELEASE ORAL at 08:32

## 2025-01-14 RX ADMIN — ATORVASTATIN CALCIUM 40 MG: 40 TABLET, FILM COATED ORAL at 20:16

## 2025-01-14 RX ADMIN — IPRATROPIUM BROMIDE AND ALBUTEROL SULFATE 3 ML: 2.5; .5 SOLUTION RESPIRATORY (INHALATION) at 06:59

## 2025-01-14 RX ADMIN — Medication 3 ML: at 11:19

## 2025-01-14 RX ADMIN — Medication 3 ML: at 20:42

## 2025-01-14 RX ADMIN — LEVOTHYROXINE SODIUM 50 MCG: 0.05 TABLET ORAL at 05:36

## 2025-01-14 RX ADMIN — IPRATROPIUM BROMIDE AND ALBUTEROL SULFATE 3 ML: 2.5; .5 SOLUTION RESPIRATORY (INHALATION) at 11:19

## 2025-01-14 RX ADMIN — AMIODARONE HYDROCHLORIDE 200 MG: 200 TABLET ORAL at 08:32

## 2025-01-14 RX ADMIN — IPRATROPIUM BROMIDE AND ALBUTEROL SULFATE 3 ML: 2.5; .5 SOLUTION RESPIRATORY (INHALATION) at 20:47

## 2025-01-14 RX ADMIN — INSULIN LISPRO 4 UNITS: 100 INJECTION, SOLUTION INTRAVENOUS; SUBCUTANEOUS at 17:10

## 2025-01-14 RX ADMIN — HEPARIN SODIUM 600 UNITS/HR: 10000 INJECTION, SOLUTION INTRAVENOUS at 05:32

## 2025-01-14 RX ADMIN — Medication 10 MG: at 20:22

## 2025-01-14 RX ADMIN — PANTOPRAZOLE SODIUM 40 MG: 40 INJECTION, POWDER, FOR SOLUTION INTRAVENOUS at 08:32

## 2025-01-14 RX ADMIN — PANTOPRAZOLE SODIUM 40 MG: 40 INJECTION, POWDER, FOR SOLUTION INTRAVENOUS at 20:16

## 2025-01-14 RX ADMIN — Medication 4 L/MIN: at 20:49

## 2025-01-14 RX ADMIN — METOPROLOL SUCCINATE 50 MG: 50 TABLET, EXTENDED RELEASE ORAL at 08:32

## 2025-01-14 RX ADMIN — ASPIRIN 81 MG: 81 TABLET, COATED ORAL at 08:32

## 2025-01-14 RX ADMIN — AMLODIPINE BESYLATE 5 MG: 5 TABLET ORAL at 08:32

## 2025-01-14 RX ADMIN — Medication 3 ML: at 06:59

## 2025-01-14 RX ADMIN — Medication 4 L/MIN: at 07:02

## 2025-01-14 SDOH — ECONOMIC STABILITY: HOUSING INSECURITY: AT ANY TIME IN THE PAST 12 MONTHS, WERE YOU HOMELESS OR LIVING IN A SHELTER (INCLUDING NOW)?: NO

## 2025-01-14 SDOH — ECONOMIC STABILITY: HOUSING INSECURITY: IN THE LAST 12 MONTHS, WAS THERE A TIME WHEN YOU WERE NOT ABLE TO PAY THE MORTGAGE OR RENT ON TIME?: NO

## 2025-01-14 SDOH — SOCIAL STABILITY: SOCIAL INSECURITY: ARE YOU MARRIED, WIDOWED, DIVORCED, SEPARATED, NEVER MARRIED, OR LIVING WITH A PARTNER?: DIVORCED

## 2025-01-14 SDOH — HEALTH STABILITY: MENTAL HEALTH: HOW OFTEN DO YOU HAVE A DRINK CONTAINING ALCOHOL?: NEVER

## 2025-01-14 SDOH — ECONOMIC STABILITY: INCOME INSECURITY: IN THE PAST 12 MONTHS HAS THE ELECTRIC, GAS, OIL, OR WATER COMPANY THREATENED TO SHUT OFF SERVICES IN YOUR HOME?: NO

## 2025-01-14 SDOH — SOCIAL STABILITY: SOCIAL INSECURITY: WITHIN THE LAST YEAR, HAVE YOU BEEN AFRAID OF YOUR PARTNER OR EX-PARTNER?: NO

## 2025-01-14 SDOH — HEALTH STABILITY: PHYSICAL HEALTH: ON AVERAGE, HOW MANY MINUTES DO YOU ENGAGE IN EXERCISE AT THIS LEVEL?: 0 MIN

## 2025-01-14 SDOH — ECONOMIC STABILITY: FOOD INSECURITY: WITHIN THE PAST 12 MONTHS, YOU WORRIED THAT YOUR FOOD WOULD RUN OUT BEFORE YOU GOT THE MONEY TO BUY MORE.: NEVER TRUE

## 2025-01-14 SDOH — HEALTH STABILITY: PHYSICAL HEALTH: ON AVERAGE, HOW MANY DAYS PER WEEK DO YOU ENGAGE IN MODERATE TO STRENUOUS EXERCISE (LIKE A BRISK WALK)?: 0 DAYS

## 2025-01-14 SDOH — ECONOMIC STABILITY: FOOD INSECURITY: WITHIN THE PAST 12 MONTHS, THE FOOD YOU BOUGHT JUST DIDN'T LAST AND YOU DIDN'T HAVE MONEY TO GET MORE.: NEVER TRUE

## 2025-01-14 SDOH — ECONOMIC STABILITY: FOOD INSECURITY: HOW HARD IS IT FOR YOU TO PAY FOR THE VERY BASICS LIKE FOOD, HOUSING, MEDICAL CARE, AND HEATING?: SOMEWHAT HARD

## 2025-01-14 SDOH — SOCIAL STABILITY: SOCIAL INSECURITY: WITHIN THE LAST YEAR, HAVE YOU BEEN HUMILIATED OR EMOTIONALLY ABUSED IN OTHER WAYS BY YOUR PARTNER OR EX-PARTNER?: NO

## 2025-01-14 SDOH — HEALTH STABILITY: MENTAL HEALTH: HOW OFTEN DO YOU HAVE SIX OR MORE DRINKS ON ONE OCCASION?: NEVER

## 2025-01-14 SDOH — ECONOMIC STABILITY: TRANSPORTATION INSECURITY: IN THE PAST 12 MONTHS, HAS LACK OF TRANSPORTATION KEPT YOU FROM MEDICAL APPOINTMENTS OR FROM GETTING MEDICATIONS?: NO

## 2025-01-14 SDOH — HEALTH STABILITY: MENTAL HEALTH: HOW MANY DRINKS CONTAINING ALCOHOL DO YOU HAVE ON A TYPICAL DAY WHEN YOU ARE DRINKING?: PATIENT DOES NOT DRINK

## 2025-01-14 SDOH — SOCIAL STABILITY: SOCIAL NETWORK: HOW OFTEN DO YOU ATTEND MEETINGS OF THE CLUBS OR ORGANIZATIONS YOU BELONG TO?: NEVER

## 2025-01-14 SDOH — ECONOMIC STABILITY: HOUSING INSECURITY: IN THE PAST 12 MONTHS, HOW MANY TIMES HAVE YOU MOVED WHERE YOU WERE LIVING?: 1

## 2025-01-14 SDOH — SOCIAL STABILITY: SOCIAL NETWORK: HOW OFTEN DO YOU ATTEND CHURCH OR RELIGIOUS SERVICES?: MORE THAN 4 TIMES PER YEAR

## 2025-01-14 SDOH — SOCIAL STABILITY: SOCIAL NETWORK: HOW OFTEN DO YOU GET TOGETHER WITH FRIENDS OR RELATIVES?: MORE THAN THREE TIMES A WEEK

## 2025-01-14 ASSESSMENT — PAIN - FUNCTIONAL ASSESSMENT
PAIN_FUNCTIONAL_ASSESSMENT: FLACC (FACE, LEGS, ACTIVITY, CRY, CONSOLABILITY)
PAIN_FUNCTIONAL_ASSESSMENT: 0-10
PAIN_FUNCTIONAL_ASSESSMENT: 0-10
PAIN_FUNCTIONAL_ASSESSMENT: FLACC (FACE, LEGS, ACTIVITY, CRY, CONSOLABILITY)

## 2025-01-14 ASSESSMENT — COGNITIVE AND FUNCTIONAL STATUS - GENERAL
DRESSING REGULAR LOWER BODY CLOTHING: A LOT
PERSONAL GROOMING: A LITTLE
MOBILITY SCORE: 7
DAILY ACTIVITIY SCORE: 13
MOVING TO AND FROM BED TO CHAIR: TOTAL
EATING MEALS: A LITTLE
WALKING IN HOSPITAL ROOM: TOTAL
MOVING FROM LYING ON BACK TO SITTING ON SIDE OF FLAT BED WITH BEDRAILS: A LOT
DRESSING REGULAR UPPER BODY CLOTHING: A LOT
TOILETING: TOTAL
CLIMB 3 TO 5 STEPS WITH RAILING: TOTAL
TURNING FROM BACK TO SIDE WHILE IN FLAT BAD: TOTAL
HELP NEEDED FOR BATHING: A LOT
STANDING UP FROM CHAIR USING ARMS: TOTAL

## 2025-01-14 ASSESSMENT — ACTIVITIES OF DAILY LIVING (ADL)
LACK_OF_TRANSPORTATION: NO
HOME_MANAGEMENT_TIME_ENTRY: 20
LACK_OF_TRANSPORTATION: NO

## 2025-01-14 ASSESSMENT — PAIN SCALES - GENERAL
PAINLEVEL_OUTOF10: 0 - NO PAIN

## 2025-01-14 ASSESSMENT — LIFESTYLE VARIABLES
SKIP TO QUESTIONS 9-10: 1
AUDIT-C TOTAL SCORE: 0

## 2025-01-14 NOTE — PROGRESS NOTES
CONSULT PROGRESS NOTES    SERVICE DATE: 1/14/2025   SERVICE TIME: 11:17 AM    CONSULTING SERVICE: Nephrology    ASSESSMENT AND PLAN   69-year-old woman in on dialysis admitted with clotted arteriovenous graft, now alteration in mental status.  1.  End-stage renal disease  2.  Malfunctioning AVG  3.  Edema  4.  Fluid overload  5.  Essential hypertension  6.  Anemia of chronic kidney disease     Status post stent placement.  Attempt to use the AVG tomorrow during dialysis.  If it does not work, she does still have an acute catheter in place.  I would leave the acute catheter in for right now.    Improvement in the fluid overload state.  Attempt 3 L volume removal tomorrow.  Erythropoietin stimulating agents to be given with dialysis.  Check CBC tomorrow, but no need for daily routine chemistry in this dialysis patient.  I will follow her.  Assuming we have working access tomorrow and there is a plan in place for anticoagulation, consider discharge after dialysis tomorrow.    SUBJECTIVE  INTERVAL HPI: She feels okay, but is anxious about being discharged.  She had stent placement yesterday and was commenced on a heparin drip.  She has no pain about her right arm    MEDICATIONS:  amiodarone, 200 mg, oral, Daily with breakfast  amLODIPine, 5 mg, oral, Daily  aspirin, 81 mg, oral, Daily  atorvastatin, 40 mg, oral, Nightly  [Held by provider] clopidogrel, 75 mg, oral, Daily  epoetin debar or biosimilar, 5,000 Units, subcutaneous, Once per day on Monday Wednesday Friday  heparin, 2,000 Units, intra-catheter, After Dialysis  heparin, 2,000 Units, intra-catheter, After Dialysis  insulin lispro, 0-5 Units, subcutaneous, TID AC  ipratropium-albuteroL, 3 mL, nebulization, 4x daily  levothyroxine, 200 mcg, oral, Daily  levothyroxine, 50 mcg, oral, Daily  metoprolol succinate XL, 50 mg, oral, Daily  oxygen, , inhalation, Continuous - Inhalation  [Held by provider] pantoprazole, 40 mg, oral, BID  pantoprazole, 40 mg, intravenous,  BID  sodium chloride, 3 mL, nebulization, 4x daily  venlafaxine XR, 150 mg, oral, Daily       heparin, 0-4,000 Units/hr, Last Rate: 600 Units/hr (01/14/25 0532)       PRN medications: acetaminophen **OR** acetaminophen **OR** acetaminophen, albuterol, benzocaine-menthol, bisacodyl, dextromethorphan-guaifenesin, dextrose, dextrose, glucagon, glucagon, guaiFENesin, heparin (porcine), melatonin, polyethylene glycol, prochlorperazine **OR** prochlorperazine **OR** prochlorperazine     OBJECTIVE  PHYSICAL EXAM:   Heart Rate:  [68-82]   Temp:  [36 °C (96.8 °F)-36.7 °C (98 °F)]   Resp:  [15-18]   BP: (125-167)/(50-80)   Weight:  [73 kg (160 lb 15 oz)]   SpO2:  [93 %-100 %]   Body mass index is 25.98 kg/m².  This is a chronically ill-appearing woman, seems much older than her known age  Very pale skin  Hearing intact  Phonation intact  Moist mucosa  Normal S1/normal S2  Lungs are clear anteriorly, increased work of breathing  Abdomen is soft, nondistended, nontender, positive bowel sounds  No Fay catheter in place, no suprapubic tenderness to palpation  Trace bilateral lower extremity edema  She has a right upper extremity graft and has an extensive bandage over it with significant blood on the bandage.  There is a faint bruit and thrill.  Moves 4 limbs spontaneously  No obvious joint deformities  No lymphadenopathy    DATA:   Labs:  Results for orders placed or performed during the hospital encounter of 01/10/25 (from the past 96 hours)   Blood Gas Arterial Full Panel   Result Value Ref Range    POCT pH, Arterial 7.24 (LL) 7.38 - 7.42 pH    POCT pCO2, Arterial 70 (HH) 38 - 42 mm Hg    POCT pO2, Arterial 69 (L) 85 - 95 mm Hg    POCT SO2, Arterial 96 94 - 100 %    POCT Oxy Hemoglobin, Arterial 93.6 (L) 94.0 - 98.0 %    POCT Hematocrit Calculated, Arterial 31.0 (L) 36.0 - 46.0 %    POCT Sodium, Arterial 123 (L) 136 - 145 mmol/L    POCT Potassium, Arterial 4.2 3.5 - 5.3 mmol/L    POCT Chloride, Arterial 92 (L) 98 - 107  mmol/L    POCT Ionized Calcium, Arterial 1.17 1.10 - 1.33 mmol/L    POCT Glucose, Arterial 60 (L) 74 - 99 mg/dL    POCT Lactate, Arterial 0.3 (L) 0.4 - 2.0 mmol/L    POCT Base Excess, Arterial 1.4 -2.0 - 3.0 mmol/L    POCT HCO3 Calculated, Arterial 30.0 (H) 22.0 - 26.0 mmol/L    POCT Hemoglobin, Arterial 10.3 (L) 12.0 - 16.0 g/dL    POCT Anion Gap, Arterial 5 (L) 10 - 25 mmo/L    Patient Temperature 37.0 degrees Celsius    FiO2 36 %    Apparatus CANNULA     Critical Called By MARCELA BRASWELL RRT     Critical Called To DR HAYS     Critical Call Time 1237     Critical Read Back Y     Critical Note CRVR     Site of Arterial Puncture Radial Left     Jonathan's Test Positive    POCT GLUCOSE   Result Value Ref Range    POCT Glucose 56 (L) 74 - 99 mg/dL   POCT GLUCOSE   Result Value Ref Range    POCT Glucose 111 (H) 74 - 99 mg/dL   Blood Gas Venous Full Panel   Result Value Ref Range    POCT pH, Venous 7.20 (LL) 7.33 - 7.43 pH    POCT pCO2, Venous 73 (HH) 41 - 51 mm Hg    POCT pO2, Venous 48 (H) 35 - 45 mm Hg    POCT SO2, Venous 80 (H) 45 - 75 %    POCT Oxy Hemoglobin, Venous 79.1 (H) 45.0 - 75.0 %    POCT Hematocrit Calculated, Venous 31.0 (L) 36.0 - 46.0 %    POCT Sodium, Venous 124 (L) 136 - 145 mmol/L    POCT Potassium, Venous 4.2 3.5 - 5.3 mmol/L    POCT Chloride, Venous 90 (L) 98 - 107 mmol/L    POCT Ionized Calicum, Venous 1.13 1.10 - 1.33 mmol/L    POCT Glucose, Venous 79 74 - 99 mg/dL    POCT Lactate, Venous 0.4 0.4 - 2.0 mmol/L    POCT Base Excess, Venous -0.7 -2.0 - 3.0 mmol/L    POCT HCO3 Calculated, Venous 28.5 (H) 22.0 - 26.0 mmol/L    POCT Hemoglobin, Venous 10.3 (L) 12.0 - 16.0 g/dL    POCT Anion Gap, Venous 10.0 10.0 - 25.0 mmol/L    Patient Temperature 37.0 degrees Celsius    FiO2 40 %   POCT GLUCOSE   Result Value Ref Range    POCT Glucose 79 74 - 99 mg/dL   Blood Gas Venous Full Panel   Result Value Ref Range    POCT pH, Venous 7.26 (L) 7.33 - 7.43 pH    POCT pCO2, Venous 69 (H) 41 - 51 mm Hg    POCT  pO2, Venous 42 35 - 45 mm Hg    POCT SO2, Venous 76 (H) 45 - 75 %    POCT Oxy Hemoglobin, Venous 74.5 45.0 - 75.0 %    POCT Hematocrit Calculated, Venous 33.0 (L) 36.0 - 46.0 %    POCT Sodium, Venous 130 (L) 136 - 145 mmol/L    POCT Potassium, Venous 3.3 (L) 3.5 - 5.3 mmol/L    POCT Chloride, Venous 95 (L) 98 - 107 mmol/L    POCT Ionized Calicum, Venous 1.13 1.10 - 1.33 mmol/L    POCT Glucose, Venous 66 (L) 74 - 99 mg/dL    POCT Lactate, Venous 0.6 0.4 - 2.0 mmol/L    POCT Base Excess, Venous 2.5 -2.0 - 3.0 mmol/L    POCT HCO3 Calculated, Venous 31.0 (H) 22.0 - 26.0 mmol/L    POCT Hemoglobin, Venous 10.9 (L) 12.0 - 16.0 g/dL    POCT Anion Gap, Venous 7.0 (L) 10.0 - 25.0 mmol/L    Patient Temperature 37.0 degrees Celsius    FiO2 40 %   POCT GLUCOSE   Result Value Ref Range    POCT Glucose 57 (L) 74 - 99 mg/dL   Calcium, ionized   Result Value Ref Range    POCT Calcium, Ionized 1.07 (L) 1.1 - 1.33 mmol/L   Magnesium   Result Value Ref Range    Magnesium 1.77 1.60 - 2.40 mg/dL   Renal function panel   Result Value Ref Range    Glucose 63 (L) 74 - 99 mg/dL    Sodium 133 (L) 136 - 145 mmol/L    Potassium 3.6 3.5 - 5.3 mmol/L    Chloride 96 (L) 98 - 107 mmol/L    Bicarbonate 31 21 - 32 mmol/L    Anion Gap 10 10 - 20 mmol/L    Urea Nitrogen 16 6 - 23 mg/dL    Creatinine 1.75 (H) 0.50 - 1.05 mg/dL    eGFR 31 (L) >60 mL/min/1.73m*2    Calcium 7.9 (L) 8.6 - 10.3 mg/dL    Phosphorus 1.9 (L) 2.5 - 4.9 mg/dL    Albumin 2.9 (L) 3.4 - 5.0 g/dL   POCT GLUCOSE   Result Value Ref Range    POCT Glucose 103 (H) 74 - 99 mg/dL   POCT GLUCOSE   Result Value Ref Range    POCT Glucose 87 74 - 99 mg/dL   POCT GLUCOSE   Result Value Ref Range    POCT Glucose 71 (L) 74 - 99 mg/dL   CBC   Result Value Ref Range    WBC 11.8 (H) 4.4 - 11.3 x10*3/uL    nRBC 0.0 0.0 - 0.0 /100 WBCs    RBC 3.09 (L) 4.00 - 5.20 x10*6/uL    Hemoglobin 9.7 (L) 12.0 - 16.0 g/dL    Hematocrit 30.3 (L) 36.0 - 46.0 %    MCV 98 80 - 100 fL    MCH 31.4 26.0 - 34.0 pg    MCHC  32.0 32.0 - 36.0 g/dL    RDW 13.5 11.5 - 14.5 %    Platelets 178 150 - 450 x10*3/uL   Calcium, Ionized   Result Value Ref Range    POCT Calcium, Ionized 1.06 (L) 1.1 - 1.33 mmol/L   Magnesium   Result Value Ref Range    Magnesium 1.74 1.60 - 2.40 mg/dL   Renal function panel   Result Value Ref Range    Glucose 71 (L) 74 - 99 mg/dL    Sodium 135 (L) 136 - 145 mmol/L    Potassium 3.8 3.5 - 5.3 mmol/L    Chloride 98 98 - 107 mmol/L    Bicarbonate 31 21 - 32 mmol/L    Anion Gap 10 10 - 20 mmol/L    Urea Nitrogen 19 6 - 23 mg/dL    Creatinine 2.25 (H) 0.50 - 1.05 mg/dL    eGFR 23 (L) >60 mL/min/1.73m*2    Calcium 7.4 (L) 8.6 - 10.3 mg/dL    Phosphorus 1.9 (L) 2.5 - 4.9 mg/dL    Albumin 2.7 (L) 3.4 - 5.0 g/dL   Blood Gas Venous Full Panel   Result Value Ref Range    POCT pH, Venous 7.35 7.33 - 7.43 pH    POCT pCO2, Venous 56 (H) 41 - 51 mm Hg    POCT pO2, Venous 46 (H) 35 - 45 mm Hg    POCT SO2, Venous 80 (H) 45 - 75 %    POCT Oxy Hemoglobin, Venous 78.7 (H) 45.0 - 75.0 %    POCT Hematocrit Calculated, Venous 30.0 (L) 36.0 - 46.0 %    POCT Sodium, Venous 130 (L) 136 - 145 mmol/L    POCT Potassium, Venous 3.9 3.5 - 5.3 mmol/L    POCT Chloride, Venous 97 (L) 98 - 107 mmol/L    POCT Ionized Calicum, Venous 1.13 1.10 - 1.33 mmol/L    POCT Glucose, Venous 72 (L) 74 - 99 mg/dL    POCT Lactate, Venous 0.6 0.4 - 2.0 mmol/L    POCT Base Excess, Venous 4.3 (H) -2.0 - 3.0 mmol/L    POCT HCO3 Calculated, Venous 30.9 (H) 22.0 - 26.0 mmol/L    POCT Hemoglobin, Venous 10.1 (L) 12.0 - 16.0 g/dL    POCT Anion Gap, Venous 6.0 (L) 10.0 - 25.0 mmol/L    Patient Temperature 37.0 degrees Celsius    FiO2 30 %    Ventilator Mode BiPAP     Ventilator Rate 16 bpm    Inspiratory Time 0.9     Ipap CMH2O 20.0 cm H2O    Epap CMH2O 8.0 cm H2O   POCT GLUCOSE   Result Value Ref Range    POCT Glucose 79 74 - 99 mg/dL   aPTT   Result Value Ref Range    aPTT 29.8 22.0 - 32.5 seconds   POCT GLUCOSE   Result Value Ref Range    POCT Glucose 99 74 - 99 mg/dL    POCT GLUCOSE   Result Value Ref Range    POCT Glucose 145 (H) 74 - 99 mg/dL   aPTT   Result Value Ref Range    aPTT 60.2 (H) 22.0 - 32.5 seconds   POCT GLUCOSE   Result Value Ref Range    POCT Glucose 141 (H) 74 - 99 mg/dL   aPTT   Result Value Ref Range    aPTT 53.2 (H) 22.0 - 32.5 seconds   POCT GLUCOSE   Result Value Ref Range    POCT Glucose 108 (H) 74 - 99 mg/dL   POCT GLUCOSE   Result Value Ref Range    POCT Glucose 91 74 - 99 mg/dL   Calcium, Ionized   Result Value Ref Range    POCT Calcium, Ionized 1.05 (L) 1.1 - 1.33 mmol/L   Magnesium   Result Value Ref Range    Magnesium 1.95 1.60 - 2.40 mg/dL   Renal function panel   Result Value Ref Range    Glucose 84 74 - 99 mg/dL    Sodium 134 (L) 136 - 145 mmol/L    Potassium 3.5 3.5 - 5.3 mmol/L    Chloride 97 (L) 98 - 107 mmol/L    Bicarbonate 30 21 - 32 mmol/L    Anion Gap 11 10 - 20 mmol/L    Urea Nitrogen 30 (H) 6 - 23 mg/dL    Creatinine 2.93 (H) 0.50 - 1.05 mg/dL    eGFR 17 (L) >60 mL/min/1.73m*2    Calcium 7.5 (L) 8.6 - 10.3 mg/dL    Phosphorus 2.5 2.5 - 4.9 mg/dL    Albumin 2.6 (L) 3.4 - 5.0 g/dL   CBC   Result Value Ref Range    WBC 13.0 (H) 4.4 - 11.3 x10*3/uL    nRBC 0.0 0.0 - 0.0 /100 WBCs    RBC 3.09 (L) 4.00 - 5.20 x10*6/uL    Hemoglobin 9.6 (L) 12.0 - 16.0 g/dL    Hematocrit 30.0 (L) 36.0 - 46.0 %    MCV 97 80 - 100 fL    MCH 31.1 26.0 - 34.0 pg    MCHC 32.0 32.0 - 36.0 g/dL    RDW 13.4 11.5 - 14.5 %    Platelets 188 150 - 450 x10*3/uL   aPTT   Result Value Ref Range    aPTT 35.6 (H) 22.0 - 32.5 seconds   POCT GLUCOSE   Result Value Ref Range    POCT Glucose 87 74 - 99 mg/dL   POCT GLUCOSE   Result Value Ref Range    POCT Glucose 104 (H) 74 - 99 mg/dL   aPTT   Result Value Ref Range    aPTT 38.2 (H) 22.0 - 32.5 seconds   POCT GLUCOSE   Result Value Ref Range    POCT Glucose 93 74 - 99 mg/dL   POCT GLUCOSE   Result Value Ref Range    POCT Glucose 125 (H) 74 - 99 mg/dL   Calcium, Ionized   Result Value Ref Range    POCT Calcium, Ionized 1.08 (L) 1.1  - 1.33 mmol/L   Magnesium   Result Value Ref Range    Magnesium 1.87 1.60 - 2.40 mg/dL   Renal function panel   Result Value Ref Range    Glucose 83 74 - 99 mg/dL    Sodium 132 (L) 136 - 145 mmol/L    Potassium 3.7 3.5 - 5.3 mmol/L    Chloride 96 (L) 98 - 107 mmol/L    Bicarbonate 30 21 - 32 mmol/L    Anion Gap 10 10 - 20 mmol/L    Urea Nitrogen 38 (H) 6 - 23 mg/dL    Creatinine 3.47 (H) 0.50 - 1.05 mg/dL    eGFR 14 (L) >60 mL/min/1.73m*2    Calcium 7.6 (L) 8.6 - 10.3 mg/dL    Phosphorus 3.0 2.5 - 4.9 mg/dL    Albumin 2.6 (L) 3.4 - 5.0 g/dL   APTT   Result Value Ref Range    aPTT 43.5 (H) 22.0 - 32.5 seconds   POCT GLUCOSE   Result Value Ref Range    POCT Glucose 92 74 - 99 mg/dL   POCT GLUCOSE   Result Value Ref Range    POCT Glucose 148 (H) 74 - 99 mg/dL   aPTT   Result Value Ref Range    aPTT 34.2 (H) 22.0 - 32.5 seconds   POCT GLUCOSE   Result Value Ref Range    POCT Glucose 94 74 - 99 mg/dL   aPTT   Result Value Ref Range    aPTT 64.1 (H) 22.0 - 32.5 seconds   Lavender Top   Result Value Ref Range    Extra Tube Hold for add-ons.    PST Top   Result Value Ref Range    Extra Tube Hold for add-ons.    Basic metabolic panel   Result Value Ref Range    Glucose 54 (LL) 74 - 99 mg/dL    Sodium 136 136 - 145 mmol/L    Potassium 3.9 3.5 - 5.3 mmol/L    Chloride 100 98 - 107 mmol/L    Bicarbonate 32 21 - 32 mmol/L    Anion Gap 8 (L) 10 - 20 mmol/L    Urea Nitrogen 19 6 - 23 mg/dL    Creatinine 2.39 (H) 0.50 - 1.05 mg/dL    eGFR 21 (L) >60 mL/min/1.73m*2    Calcium 7.8 (L) 8.6 - 10.3 mg/dL   CBC and Auto Differential   Result Value Ref Range    WBC 10.7 4.4 - 11.3 x10*3/uL    nRBC 0.0 0.0 - 0.0 /100 WBCs    RBC 2.74 (L) 4.00 - 5.20 x10*6/uL    Hemoglobin 8.6 (L) 12.0 - 16.0 g/dL    Hematocrit 27.4 (L) 36.0 - 46.0 %     80 - 100 fL    MCH 31.4 26.0 - 34.0 pg    MCHC 31.4 (L) 32.0 - 36.0 g/dL    RDW 13.6 11.5 - 14.5 %    Platelets 157 150 - 450 x10*3/uL    Neutrophils % 86.6 40.0 - 80.0 %    Immature Granulocytes %,  Automated 0.6 0.0 - 0.9 %    Lymphocytes % 6.3 13.0 - 44.0 %    Monocytes % 6.0 2.0 - 10.0 %    Eosinophils % 0.3 0.0 - 6.0 %    Basophils % 0.2 0.0 - 2.0 %    Neutrophils Absolute 9.28 (H) 1.20 - 7.70 x10*3/uL    Immature Granulocytes Absolute, Automated 0.06 0.00 - 0.70 x10*3/uL    Lymphocytes Absolute 0.68 (L) 1.20 - 4.80 x10*3/uL    Monocytes Absolute 0.64 0.10 - 1.00 x10*3/uL    Eosinophils Absolute 0.03 0.00 - 0.70 x10*3/uL    Basophils Absolute 0.02 0.00 - 0.10 x10*3/uL   POCT GLUCOSE   Result Value Ref Range    POCT Glucose 60 (L) 74 - 99 mg/dL   POCT GLUCOSE   Result Value Ref Range    POCT Glucose 69 (L) 74 - 99 mg/dL   POCT GLUCOSE   Result Value Ref Range    POCT Glucose 107 (H) 74 - 99 mg/dL     *Note: Due to a large number of results and/or encounters for the requested time period, some results have not been displayed. A complete set of results can be found in Results Review.         SIGNATURE: Joseph Mitchell MD PATIENT NAME: Daphne Morris   DATE: January 14, 2025 MRN: [id number]   TIME: 11:17 AM PAGER: [phone]

## 2025-01-14 NOTE — ASSESSMENT & PLAN NOTE
Consult nephrology (Dr. Mitchell)  Dialysis days are Tuesday, Thursday, and Saturday    Respiratory acidosis with acute on chronic hypoxic respiratory failure  Much improved has been transferred out of ICU  Patient at baseline on 3 L of nasal cannula    Metabolic encephalopathy  Status post IR procedure patient is somewhat lethargic  Monitor closely    Hypertension  Continue home amlodipine    Atrial fibrillation  Not on anticoagulation    Hyperlipidemia  Continue home atorvastatin    Hypothyroid  Continue home levothyroxine    Chronic diastolic heart failure  Bradycardia  Patient was on metoprolol succinate 50 mg twice daily and was quite bradycardic  Will change to 50 mg once daily with parameters to hold for heart rate less than 60 or systolic blood pressure less than 100    Type 2 diabetes mellitus  Hypoglycemia protocol  Accu-Cheks before meals and at bedtime  Insulin sliding scale  Hemoglobin A1c 8.1 on January 1, 2025    Disposition    Anticipated discharge to SNF. Tomorrow HD and will use graft. Will follow up with nephrology after HD for final recommendations  Continue with Heparin gtt for now, Eliquis at discharge.

## 2025-01-14 NOTE — TELEPHONE ENCOUNTER
Per today's Nephrology progress note: s/p stent placement. Attempt to use the AVG tomorrow during dialysis. If it does not work, she does still have an acute catheter in place.

## 2025-01-14 NOTE — CARE PLAN
Problem: Skin  Goal: Decreased wound size/increased tissue granulation at next dressing change  Outcome: Progressing  Goal: Participates in plan/prevention/treatment measures  Outcome: Progressing  Goal: Prevent/manage excess moisture  Outcome: Progressing  Goal: Prevent/minimize sheer/friction injuries  Outcome: Progressing  Flowsheets (Taken 1/14/2025 0720)  Prevent/minimize sheer/friction injuries: Use pull sheet  Goal: Promote/optimize nutrition  Outcome: Progressing  Goal: Promote skin healing  Outcome: Progressing     Problem: Fall/Injury  Goal: Not fall by end of shift  Outcome: Progressing  Goal: Be free from injury by end of the shift  Outcome: Progressing  Goal: Verbalize understanding of personal risk factors for fall in the hospital  Outcome: Progressing  Goal: Verbalize understanding of risk factor reduction measures to prevent injury from fall in the home  Outcome: Progressing  Goal: Use assistive devices by end of the shift  Outcome: Progressing  Goal: Pace activities to prevent fatigue by end of the shift  Outcome: Progressing     Problem: Nutrition  Goal: Oral intake greater 75%  Outcome: Progressing  Goal: Consume prescribed supplement  Outcome: Progressing  Goal: Promote healing  Outcome: Progressing     Problem: Dialysis  Goal: I will slow progression of end-stage renal disease through participating in dialysis 3 times a week  Outcome: Progressing     Problem: Respiratory  Goal: Minimal/no exertional discomfort or dyspnea this shift  Outcome: Progressing  Goal: No signs of respiratory distress (eg. Use of accessory muscles. Peds grunting)  Outcome: Progressing  Goal: Patent airway maintained this shift  Outcome: Progressing  Goal: Verbalize decreased shortness of breath this shift  Outcome: Progressing  Goal: Wean oxygen to maintain O2 saturation per order/standard this shift  Outcome: Progressing     Problem: Bathing  Goal: STG - Patient will bathe upper body with minimal assist  Outcome:  Progressing     Problem: Dressing Upper Extremities  Goal: LTG - Patient will complete upper body dressing with set up  Outcome: Progressing     Problem: Grooming  Goal: STG - Patient completes grooming with set up  Outcome: Progressing     Problem: Toileting  Goal: STG - Patient will complete toileting tasks with 2ww and minimal assist  Outcome: Progressing     Problem: Pain - Adult  Goal: Verbalizes/displays adequate comfort level or baseline comfort level  Outcome: Progressing     Problem: Safety - Adult  Goal: Free from fall injury  Outcome: Progressing     Problem: Discharge Planning  Goal: Discharge to home or other facility with appropriate resources  Outcome: Progressing     Problem: Chronic Conditions and Co-morbidities  Goal: Patient's chronic conditions and co-morbidity symptoms are monitored and maintained or improved  Outcome: Progressing     Problem: Diabetes  Goal: Achieve decreasing blood glucose levels by end of shift  Outcome: Progressing  Goal: Increase stability of blood glucose readings by end of shift  Outcome: Progressing  Goal: Decrease in ketones present in urine by end of shift  Outcome: Progressing  Goal: Maintain electrolyte levels within acceptable range throughout shift  Outcome: Progressing  Goal: Maintain glucose levels >70mg/dl to <250mg/dl throughout shift  Outcome: Progressing  Goal: No changes in neurological exam by end of shift  Outcome: Progressing  Goal: Learn about and adhere to nutrition recommendations by end of shift  Outcome: Progressing  Goal: Vital signs within normal range for age by end of shift  Outcome: Progressing  Goal: Increase self care and/or family involovement by end of shift  Outcome: Progressing  Goal: Receive DSME education by end of shift  Outcome: Progressing   The patient's goals for the shift include      The clinical goals for the shift include maintain pulse ox 92% greater, no bleeding    Over the shift, the patient did make progress toward the  goals.

## 2025-01-14 NOTE — ASSESSMENT & PLAN NOTE
Plavix on hold  Vascular surgery following  Temporary dialysis line has been placed  IR 1/13 for thrombectomy and stent placement-> reached out to IR and IR recommending to resume heparin drip with Eliquis bridge for about a month

## 2025-01-14 NOTE — NURSING NOTE
Rounded on pt. Pt laying in bed, eyes closed, respirations even and unlabored. Pt appears to be sleeping and in no apparent pain or distress. RUE unchanged since initial assessment. Heparin infusing without complications. Call bell and belongings are placed in reach. Telemetry leads connected and reading on monitor. NC O2 in place and connected to flowmeter. Bed alarm set. Continuing to monitor.

## 2025-01-14 NOTE — PROGRESS NOTES
Physical Therapy    Physical Therapy Treatment    Patient Name: Daphne Morris  MRN: [id number]  Department: [address]  Room: [facility]  Today's Date: 1/14/2025  Time Calculation  Start Time: 1300  Stop Time: 1320  Time Calculation (min): 20 min         Assessment/Plan   PT Assessment  PT Assessment Results: Decreased strength, Decreased range of motion, Decreased endurance, Decreased mobility, Decreased coordination, Impaired hearing, Decreased skin integrity, Impaired balance  Rehab Prognosis: Good  Barriers to Discharge Home: Caregiver assistance, Physical needs  Caregiver Assistance: Patient lives alone and/or does not have reliable caregiver assistance  Physical Needs: Stair navigation into home limited by function/safety, Ambulating household distances limited by function/safety  Evaluation/Treatment Tolerance: Patient limited by fatigue  Medical Staff Made Aware: Yes  Strengths: Premorbid level of function  Barriers to Participation: Comorbidities  End of Session Communication: Bedside nurse  Assessment Comment: pt req min - max A x 2 for mobility this date; pt continues to demonstrate impaired strength, balance and overall decreased activity tolerance. pt would benefit from continued skilled PT prior to and upon discharge to address the above deficits in order to maximize functional performance and mobility.  End of Session Patient Position: Up in chair, Alarm on (call bell in reach, all needs met. RN aware)  PT Plan  Inpatient/Swing Bed or Outpatient: Inpatient  PT Plan  Treatment/Interventions: Bed mobility, Transfer training, Gait training, Balance training, Neuromuscular re-education, Strengthening, Endurance training, Range of motion, Therapeutic exercise, Therapeutic activity, Positioning, Postural re-education  PT Plan: Ongoing PT  PT Frequency: 4 times per week  PT Discharge Recommendations: Moderate intensity level of continued care  Equipment Recommended upon Discharge: Wheeled walker  PT Recommended  Transfer Status: Assist x2, Assistive device (RW)  PT - OK to Discharge: Yes      General Visit Information:   PT  Visit  PT Received On: 01/14/25  Response to Previous Treatment: Patient with no complaints from previous session.  General  Reason for Referral: impaired function d/t hypoxemia. 69 year old WF admitted with c/o clotted renal dialysis AV graft. Found to be in hypercapnic respiratory acidosis and hypoxemia.  Referred By: Gideon Melgar MD  Past Medical History Relevant to Rehab: ESRD, anemia, AFib, CHF, COPD, CVA, DM, respiratory failure, HLD, CA, HTN (P)  Family/Caregiver Present: No  Co-Treatment: OT  Co-Treatment Reason: to maximize functional performance, safety and mobility  Prior to Session Communication: Bedside nurse  Patient Position Received: Bed, 3 rail up, Alarm on  Preferred Learning Style: verbal, visual  General Comment: pt is cleared for therapy via RN, agreeable to participate, +tele, 4L O2 via NC, HD cath, IV    Subjective   Precautions:  Precautions  Hearing/Visual Limitations: [place] wears bilateral hearing aides, wears glasses  Medical Precautions: Fall precautions, Oxygen therapy device and L/min (4L O2 via NC)    Vital Signs (Past 2hrs)        Date/Time Vitals Session Patient Position Pulse Resp SpO2 BP MAP (mmHg)    01/14/25 1300 During PT  --  --  --  --  --  --                         Objective   Pain:  Pain Assessment  Pain Assessment: 0-10  0-10 (Numeric) Pain Score: 0 - No pain  Cognition:  Cognition  Overall Cognitive Status: Within Functional Limits  Safety/Judgement: Exceptions to WFL (impaired)  Insight: Mild  Impulsive: Mildly  Processing Speed: Delayed  Coordination:  Movements are Fluid and Coordinated: No  Coordination Comment: decreased rate and accuracy of movement  Postural Control:  Postural Control  Postural Control: Impaired  Posture Comment: fwd head, rounded shoulders  Static Sitting Balance  Static Sitting-Balance Support: Bilateral upper extremity supported,  Feet supported  Static Sitting-Level of Assistance: Close supervision  Static Sitting-Comment/Number of Minutes: good+ static sitting balance on EOB  Static Standing Balance  Static Standing-Balance Support: Bilateral upper extremity supported (on RW)  Static Standing-Level of Assistance: Minimum assistance  Static Standing-Comment/Number of Minutes: min A x 2 for stability and safety; fair+  Dynamic Standing Balance  Dynamic Standing-Balance Support: Bilateral upper extremity supported (on RW)  Dynamic Standing-Level of Assistance: Minimum assistance  Dynamic Standing-Comments: min A x 2, fair+  Extremity/Trunk Assessments:  RLE   RLE : Exceptions to WFL  Strength RLE  RLE Overall Strength: Greater than or equal to 3/5 as evidenced by functional mobility  LLE   LLE : Exceptions to WFL  Strength LLE  LLE Overall Strength: Greater than or equal to 3/5 as evidenced by functional mobility  Activity Tolerance:  Activity Tolerance  Endurance: Decreased tolerance for upright activites  Activity Tolerance Comments: limited due to weakness and fatigue  Rate of Perceived Exertion (RPE): 5/10  Treatments:  Therapeutic Exercise  Therapeutic Exercise Performed: Yes  Therapeutic Exercise Activity 1: supine siri APs x 30 reps  Therapeutic Exercise Activity 2: supine heel slides x10 reps siri         Therapeutic Activity  Therapeutic Activity Performed: Yes  Therapeutic Activity 1: refer to bed mob, transfers, amb    Bed Mobility  Bed Mobility: Yes  Bed Mobility 1  Bed Mobility 1: Supine to sitting  Level of Assistance 1: Moderate assistance, +2  Bed Mobility Comments 1: mod A x 2 for trunk up and B LEs off bed; pt able to initiate B LEs off bed with increased time, effort and cueing  Bed Mobility 2  Bed Mobility  2: Scooting  Level of Assistance 2: Maximum assistance, +2  Bed Mobility Comments 2: max A x 2 with use of drawsheet to scoot to EOB for improved alignment    Ambulation/Gait Training  Ambulation/Gait Training Performed:  Yes  Ambulation/Gait Training 1  Surface 1: Level tile  Device 1: Rolling walker  Assistance 1: Minimum assistance  Quality of Gait 1: Decreased step length, Forward flexed posture, Narrow base of support, Diminished heel strike  Comments/Distance (ft) 1: pt amb bed > chair with min A x 2 for stability and safety; VCs for safe walker management  Transfers  Transfer: Yes  Transfer 1  Transfer From 1: Sit to, Stand to  Transfer to 1: Stand, Sit  Technique 1: Sit to stand, Stand to sit  Transfer Device 1: Walker  Transfer Level of Assistance 1: Moderate assistance, +2  Trials/Comments 1: mod A x 2 for sit <> stand; poor ecc control noted; VCs for safe B UE and B LE placement    Stairs  Stairs: No         Outcome Measures:  [facility] Basic Mobility  Turning from your back to your side while in a flat bed without using bedrails: A lot  Moving from lying on your back to sitting on the side of a flat bed without using bedrails: Total  Moving to and from bed to chair (including a wheelchair): Total  Standing up from a chair using your arms (e.g. wheelchair or bedside chair): Total  To walk in hospital room: Total  Climbing 3-5 steps with railing: Total  Basic Mobility - Total Score: 7    Education Documentation  Body Mechanics, taught by Troy Wong, PT at 1/14/2025  2:52 PM.  Learner: Patient  Readiness: Acceptance  Method: Explanation, Demonstration  Response: Needs Reinforcement    Mobility Training, taught by Troy Wong, PT at 1/14/2025  2:52 PM.  Learner: Patient  Readiness: Acceptance  Method: Explanation, Demonstration  Response: Needs Reinforcement    Education Comments  No comments found.        OP EDUCATION:       Encounter Problems       Encounter Problems (Active)       PT Problem       Patient will ambulate 50' distance using walker with stand by assist (Progressing)       Start:  01/11/25    Expected End:  01/25/25         Goal Note       Patient will ambulate 50 distance using walker with stand by  assist              Patient will perform chair to and from bed transfer with stand by assist (Progressing)       Start:  01/11/25    Expected End:  01/25/25         Goal Note       Patient will perform chair to and from bed transfer with stand by assist              Patient will perform supine to sit on bed with stand by assist demonstrating control (Progressing)       Start:  01/11/25    Expected End:  01/25/25         Goal Note       Patient will perform supine to sit on bed with stand by assist demonstrating control                 Pain - Adult

## 2025-01-14 NOTE — NURSING NOTE
Rounded on pt. Pt laying in bed, eyes closed, respirations even and unlabored. Pt appears to be sleeping and in no apparent pain or distress. RUE unchanged since initial assessment. Heparin infusing without complications. Uneventful night with no changes since prior assessment. Call bell and belongings are placed in reach. Telemetry leads connected and reading on monitor. NC O2 in place and connected to flowmeter. Bed alarm set. Continuing to monitor.

## 2025-01-14 NOTE — PROGRESS NOTES
Daphne Morris is a 69 y.o. female on day 4 of admission presenting with Clotted renal dialysis AV graft (CMS-[facility]).      Subjective   Patient seen and examined.  Patient  feeling well, asking about discharge. Patient remains on heparin gtt and will convert to Eliquis  at discharge per IR recommendations.      Objective     Last Recorded Vitals  /56 (BP Location: Left arm, Patient Position: Lying)   Pulse 73   Temp 36.7 °C (98.1 °F) (Temporal)   Resp 20   Wt 73 kg (160 lb 15 oz)   SpO2 99%   Intake/Output last 3 Shifts:    Intake/Output Summary (Last 24 hours) at 1/14/2025 1209  Last data filed at 1/14/2025 0900  Gross per 24 hour   Intake 420 ml   Output 2010 ml   Net -1590 ml       Admission Weight  Weight: 70 kg (154 lb 5.2 oz) (01/11/25 0537)    Daily Weight  01/14/25 : 73 kg (160 lb 15 oz)          Physical Exam  Vitals reviewed.   Constitutional:       Appearance: She is ill-appearing.   HENT:      Head: Normocephalic.      Nose: Nose normal.   Eyes:      Extraocular Movements: Extraocular movements intact.   Cardiovascular:      Rate and Rhythm: Regular rhythm.   Pulmonary:      Effort: Pulmonary effort is normal.   Abdominal:      General: Bowel sounds are normal.   Musculoskeletal:      Cervical back: Neck supple.      Comments: Right UE dialysis access    Skin:     General: Skin is warm.   Neurological:      Mental Status: She is alert.      Comments: S/p IR procedure, patient is lethargic          Assessment/Plan       This patient has a central line   Reason for the central line remaining today? Dialysis/Hemapheresis    Daphne Morris is a 69 year old female patient with pmhx of ESRD, anemia, afib, chf, copd, CVA, depression dm, chronic hypoxic respiratory failure on 3L O2 at home, gerd hyperlipidemia, hypertension, hypothyroid, renal cell carcinoma s/p resection in 8/21 and vascultitis. Patient was admitted 1/9 at [facility] for clotted AV fistula and transferred to [facility] to be seen by vascular  surgery. Patient on arrival was lethargic and not responding. ABG was ordered showing hypercapnic respiratory acidosis with hypoxemia, patient was placed on bipap and transferred to ICU. Labs on admission also concerning for hyponatremia, hypoglycemia.     Assessment & Plan  Clotted renal dialysis AV graft (CMS-HCC)  Plavix on hold  Vascular surgery following  Temporary dialysis line has been placed  IR 1/13 for thrombectomy and stent placement-> reached out to IR and IR recommending to resume heparin drip with Eliquis bridge for about a month  ESRD (end stage renal disease) (Multi)  Consult nephrology (Dr. Mitchell)  Dialysis days are Tuesday, Thursday, and Saturday    Respiratory acidosis with acute on chronic hypoxic respiratory failure  Much improved has been transferred out of ICU  Patient at baseline on 3 L of nasal cannula    Metabolic encephalopathy  Status post IR procedure patient is somewhat lethargic  Monitor closely    Hypertension  Continue home amlodipine    Atrial fibrillation  Not on anticoagulation    Hyperlipidemia  Continue home atorvastatin    Hypothyroid  Continue home levothyroxine    Chronic diastolic heart failure  Bradycardia  Patient was on metoprolol succinate 50 mg twice daily and was quite bradycardic  Will change to 50 mg once daily with parameters to hold for heart rate less than 60 or systolic blood pressure less than 100    Type 2 diabetes mellitus  Hypoglycemia protocol  Accu-Cheks before meals and at bedtime  Insulin sliding scale  Hemoglobin A1c 8.1 on January 1, 2025    Disposition    Anticipated discharge to SNF. Tomorrow HD and will use graft. Will follow up with nephrology after HD for final recommendations  Continue with Heparin gtt for now, Eliquis at discharge.        Jose Cisneros, APRN-CNP

## 2025-01-14 NOTE — PROGRESS NOTES
Occupational Therapy    OT Treatment    Patient Name: Daphne Morris  MRN: [id number]  Department: [address]  Room: [facility]456-A  Today's Date: 1/14/2025  Time Calculation  Start Time: 1303  Stop Time: 1323  Time Calculation (min): 20 min        Assessment:  OT Assessment: Gradual progress made towards OT goals. Continue with current OT POC to increase strength, balance and functional tolerance to maximize safety and independence during ADLs.  Barriers to Discharge Home: Caregiver assistance, Physical needs  Caregiver Assistance: Patient lives alone and/or does not have reliable caregiver assistance  Physical Needs: Stair navigation into home limited by function/safety, Ambulating household distances limited by function/safety, 24hr mobility assistance needed, 24hr ADL assistance needed  Evaluation/Treatment Tolerance: Patient limited by fatigue  End of Session Communication: Bedside nurse  End of Session Patient Position: Up in chair, Alarm on (all needs in reach)  OT Assessment Results: Decreased ADL status, Decreased endurance, Decreased functional mobility, Decreased upper extremity strength, Decreased IADLs  Barriers to Discharge: Decreased caregiver support  Evaluation/Treatment Tolerance: Patient limited by fatigue  Plan:  Treatment Interventions: ADL retraining, Functional transfer training, Endurance training, Patient/family training, Neuromuscular reeducation, Equipment evaluation/education, Compensatory technique education  OT Frequency: 4 times per week  OT Discharge Recommendations: Moderate intensity level of continued care  Equipment Recommended upon Discharge: Wheeled walker  OT Recommended Transfer Status: Assist of 2  OT - OK to Discharge: Yes  Treatment Interventions: ADL retraining, Functional transfer training, Endurance training, Patient/family training, Neuromuscular reeducation, Equipment evaluation/education, Compensatory technique education    Subjective   Previous Visit Info:  OT Last Visit  OT  Received On: 01/14/25  General:  General  Reason for Referral: impaired function d/t hypoxemia. 69 year old WF admitted with c/o clotted renal dialysis AV graft. Found to be in hypercapnic respiratory acidosis and hypoxemia.  Past Medical History Relevant to Rehab: ESRD, anemia, AFib, CHF, COPD, CVA, DM, respiratory failure, HLD, CA, HTN (P)  Co-Treatment: PT  Co-Treatment Reason: to maximize functional performance, safety and mobility  Prior to Session Communication: Bedside nurse  Patient Position Received: Bed, 3 rail up, Alarm on  General Comment: Pt cleared for therapy session per nursing, cooperative with encouragement.  Precautions:  Hearing/Visual Limitations: [place] wears bilateral hearing aides, wears glasses  Medical Precautions: Fall precautions, Oxygen therapy device and L/min (4L O2 via NC)  Precautions Comment: telemetry            Pain:  Pain Assessment  Pain Assessment: 0-10  0-10 (Numeric) Pain Score: 0 - No pain  Clinical Progression: Not changed    Objective    Cognition:  Cognition  Overall Cognitive Status: Within Functional Limits  Orientation Level: Disoriented to time  Safety/Judgement: Exceptions to WFL  Insight: Mild  Impulsive: Mildly  Task Initiation: Initiates with cues  Processing Speed: Delayed  Coordination:  Movements are Fluid and Coordinated: No  Upper Body Coordination: slower rate of movement  Lower Body Coordination: slower rate of movement  Activities of Daily Living: Grooming  Grooming Comments: hand hygiene completed seated in bedside chair with set-up and close supervision    Toileting  Toileting Comments: pt unable to tolerate functional mobility <>bathroom- task downgraded to adapting bedpan to utilize seated EOB. MaxA required for placement/ removal of bedpan and maxA for posterior hygiene in supported standing with FWW.  Functional Standing Tolerance:  Time: ~2min x2 trials  Activity: static standing  Functional Standing Tolerance Comments: Standing tasks completed this  date with FWW and minAx2 to remain standing to increase functional tolerance, strength and challenge balance to maximize safety and independence during ADLs.  Bed Mobility/Transfers: Bed Mobility  Bed Mobility: Yes  Bed Mobility 1  Bed Mobility 1: Supine to sitting  Level of Assistance 1: Moderate assistance, +2  Bed Mobility Comments 1: HOB moderately elevated to assist with task completion, increased verbal/ tactile cues for sequencing  Bed Mobility 2  Bed Mobility  2: Scooting  Level of Assistance 2: Maximum assistance, +2  Bed Mobility Comments 2: increased assistance required for scooting towards EOB with use of draw sheet.    Transfers  Transfer: Yes  Transfer 1  Trials/Comments 1: sit<>stands completed from minimally elevated EOB height with FWW and modAx2- verbal/ tactile cues required for proper hand placement and line management to increase safety and decrease risk of falls. Bed>chair transfer completed with FWW and minAx2- increased time and verbal cues required for task completion and line management.      Outcome Measures:[facility] [address] [facility] Daily Activity  Putting on and taking off regular lower body clothing: A lot  Bathing (including washing, rinsing, drying): A lot  Putting on and taking off regular upper body clothing: A lot  Toileting, which includes using toilet, bedpan or urinal: Total  Taking care of personal grooming such as brushing teeth: A little  Eating Meals: A little  Daily Activity - Total Score: 13        Education Documentation  Body Mechanics, taught by DAYO Dangelo at 1/14/2025  5:39 PM.  Learner: Patient  Readiness: Acceptance  Method: Explanation, Demonstration  Response: Needs Reinforcement    ADL Training, taught by DAYO Dangelo at 1/14/2025  5:39 PM.  Learner: Patient  Readiness: Acceptance  Method: Explanation, Demonstration  Response: Needs Reinforcement    Education Comments  Education provided on role of OT/POC, safety awareness throughout functional tasks/transfers,  importance of activity/ rest routine, EC/WS techniques, and use of call light for assistance. Questions, comments and concerns addressed regarding OT.      Goals:  Encounter Problems       Encounter Problems (Active)       Bathing       STG - Patient will bathe upper body with minimal assist (Progressing)       Start:  01/11/25    Expected End:  01/13/25               Dressing Upper Extremities       LTG - Patient will complete upper body dressing with set up (Progressing)       Start:  01/11/25    Expected End:  01/13/25               Grooming       STG - Patient completes grooming with set up (Progressing)       Start:  01/11/25    Expected End:  01/13/25               OT Transfers       LTG - Patient will perform all functional transfers with 2ww and minimal assist (Progressing)       Start:  01/11/25    Expected End:  02/11/25               Toileting       STG - Patient will complete toileting tasks with 2ww and minimal assist (Progressing)       Start:  01/11/25    Expected End:  01/13/25

## 2025-01-14 NOTE — PROGRESS NOTES
[facility] met with the pt who confirmed the plan is to return to [facility] to finish rehab. Pt is active with house calls as well who states [facility] is working on getting pt an assisted living waiver for long term planning. Pt does require a precert to return to [facility].      01/14/25 1517   Discharge Planning   Living Arrangements Children   Support Systems Children   Type of Residence Private residence   Number of Stairs to Enter Residence 4   Number of Stairs Within Residence 0   Do you have animals or pets at home? Yes   Type of Animals or Pets Cat   Who is requesting discharge planning? Provider   Home or Post Acute Services Post acute facilities (Rehab/SNF/etc)   Type of Post Acute Facility Services Rehab;Skilled nursing   Expected Discharge Disposition SNF  ([facility])   Does the patient need discharge transport arranged? Yes   RoundTrip coordination needed? Yes   Has discharge transport been arranged? No   Financial Resource Strain   How hard is it for you to pay for the very basics like food, housing, medical care, and heating? Somewhat   Housing Stability   In the last 12 months, was there a time when you were not able to pay the mortgage or rent on time? N   In the past 12 months, how many times have you moved where you were living? 1   At any time in the past 12 months, were you homeless or living in a shelter (including now)? N   Transportation Needs   In the past 12 months, has lack of transportation kept you from medical appointments or from getting medications? no   In the past 12 months, has lack of transportation kept you from meetings, work, or from getting things needed for daily living? No

## 2025-01-14 NOTE — NURSING NOTE
Pt arouses to voice for synthroid administration. Pt a little more alert while awake - more conversational. Pt asks if someone brought her coffee. I tell her it looks like someone dropped one off on her bedside table. I push table slightly over her so she can reach.  She states you'd think someone would let you know. I tell her I suspect it looked like she had fallen back asleep. Pt drinks from cup independently and she acknowledges that it is cool enough to drink. Pt's RUE showing a small amount of serosanguinous drainage on more lateral dressing of folded gauze on upper arm making dressing entirely pink. Heparin drip decreased by 200 units/hr for ptt of 64.1 at 0536. Next ptt in 6 hours. Continuing to monitor.

## 2025-01-14 NOTE — PROGRESS NOTES
"Nutrition Follow up Note    Nutrition Assessment      Pt had BiPAP running, unable to speak. Pt had IR thrombectomy yesterday. Will continue to provide andre BID.    Nutrition History:     Energy Intake: Good > 75 %  Food Allergies/Intolerances:  None    Anthropometrics:  Ht: 167.6 cm (5' 6\"), Wt: 73 kg (160 lb 15 oz), BMI: 25.99             Weight Change:  Daily Weight  01/14/25 : 73 kg (160 lb 15 oz)  01/09/25 : 70.3 kg (155 lb)  01/02/25 : 70.7 kg (155 lb 13.8 oz)  12/31/24 : 76.4 kg (168 lb 6.9 oz)  12/29/24 : 74.8 kg (165 lb)  12/26/24 : 71.2 kg (157 lb)  12/26/24 : 69.9 kg (154 lb)  12/20/24 : 69.9 kg (154 lb)  11/11/24 : 69.9 kg (154 lb)  09/25/24 : 69.9 kg (154 lb)     Weight History / % Weight Change: stable weight for 5 months             Nutrition Focused Physical Exam Findings:                       Nutrition Significant Labs:  Lab Results   Component Value Date    WBC 10.7 01/14/2025    HGB 8.6 (L) 01/14/2025    HCT 27.4 (L) 01/14/2025     01/14/2025    CHOL 128 04/27/2023    TRIG 100 04/27/2023    HDL 49.0 04/27/2023    ALT 7 01/09/2025    AST 10 01/09/2025     01/14/2025    K 3.9 01/14/2025     01/14/2025    CREATININE 2.39 (H) 01/14/2025    BUN 19 01/14/2025    CO2 32 01/14/2025    TSH 2.60 01/10/2025    INR 1.0 12/26/2024    HGBA1C 8.8 (H) 01/01/2025    ALBUR 1,714 (H) 07/15/2020     Nutrition Specific Medications:  amiodarone, 200 mg, oral, Daily with breakfast  amLODIPine, 5 mg, oral, Daily  aspirin, 81 mg, oral, Daily  atorvastatin, 40 mg, oral, Nightly  [Held by provider] clopidogrel, 75 mg, oral, Daily  epoetin debra or biosimilar, 5,000 Units, subcutaneous, Once per day on Monday Wednesday Friday  heparin, 2,000 Units, intra-catheter, After Dialysis  heparin, 2,000 Units, intra-catheter, After Dialysis  insulin lispro, 0-5 Units, subcutaneous, TID AC  ipratropium-albuteroL, 3 mL, nebulization, 4x daily  levothyroxine, 200 mcg, oral, Daily  levothyroxine, 50 mcg, oral, " Daily  metoprolol succinate XL, 50 mg, oral, Daily  oxygen, , inhalation, Continuous - Inhalation  [Held by provider] pantoprazole, 40 mg, oral, BID  pantoprazole, 40 mg, intravenous, BID  sodium chloride, 3 mL, nebulization, 4x daily  venlafaxine XR, 150 mg, oral, Daily      heparin, 0-4,000 Units/hr, Last Rate: 400 Units/hr (01/14/25 1224)      Dietary Orders (From admission, onward)       Start     Ordered    01/11/25 0949  Adult diet Cardiac; 70 gm fat; 2 - 3 grams Sodium  Diet effective now        Question Answer Comment   Diet type Cardiac    Fat restriction: 70 gm fat    Sodium restriction: 2 - 3 grams Sodium        01/11/25 0948    01/10/25 1438  Oral nutritional supplements  Until discontinued        Question Answer Comment   Deliver with Breakfast    Deliver with Dinner    Select supplement: [place]        01/10/25 1437    01/10/25 1438  Oral nutritional supplements  Until discontinued        Question Answer Comment   Deliver with All meals    Select supplement: Sugar Free Mighty Shake        01/10/25 1437    01/10/25 1325  May Participate in Room Service  ( ROOM SERVICE MAY PARTICIPATE)  Once        Question:  .  Answer:  Yes    01/10/25 1324                     Estimated Needs:   Estimated Energy Needs  Total Energy Estimated Needs (kCal): 2109 kCal  Total Estimated Energy Need per Day (kCal/kg): 30 kCal/kg  Method for Estimating Needs: actual wt    Estimated Protein Needs  Total Protein Estimated Needs (g):  (84-91)  Total Protein Estimated Needs (g/kg):  (1.2-1.3)  Method for Estimating Needs: actual wt    Estimated Fluid Needs  Method for Estimating Needs: urine output + 500-1000 mL/day        Nutrition Diagnosis   Nutrition Diagnosis:       Nutrition Diagnosis  Patient has Nutrition Diagnosis: Yes  Diagnosis Status (1): Ongoing  Nutrition Diagnosis 1: Increased nutrient needs  Related to (1): increased demand for nutrient  As Evidenced by (1): dialysis, wounds       Nutrition  Interventions/Recommendations   Nutrition Interventions and Recommendations:    Nutrition Prescription:  Individualized Nutrition Prescription Provided for : 2109 kcals, 84-91 g protein via diet    Nutrition Interventions:   Food and/or Nutrient Delivery Interventions  Interventions: Meals and snacks, Medical food supplement  Meals and Snacks: Mineral-modified diet  Goal: recommend a renal diet  Medical Food Supplement: Commercial beverage  Goal: andre BID to aid in wound healing; mighty shake TID to provide 200 kcals and 7g protein each    Education Documentation  No documentation found.           Nutrition Monitoring and Evaluation   Monitoring/Evaluation:   Food/Nutrient Related History Monitoring  Monitoring and Evaluation Plan: Energy intake  Energy Intake: Estimated energy intake  Criteria: pt to consume >/= 75% estimated needs              Nutrition Focused Physical Findings  Monitoring and Evaluation Plan: Skin  Skin: Impaired wound healing  Criteria: pt will show signs of improvement in skin integrity         Time Spent/Follow-up:   Follow Up  Time Spent (min): 30 minutes  Last Date of Nutrition Visit: 01/14/25  Nutrition Follow-Up Needed?: 5-7 days  Follow up Comment: 1/20/25

## 2025-01-14 NOTE — NURSING NOTE
Took over care of pt at this time. Pt laying in bed, alert, aox2-3. Pt states she's in the hospital in [place], the month is February 2025. Pt is drowsy and does not mention events for hospital stay at this time. Mepitel intact over RUE, two places covered with folded gauze also c/d/intact. No bruit or thrill is detected over RUE. Pt denies pain and has no needs or complaints at this time. Pt oriented to call bell and it and belongings are placed in reach. Telemetry leads connected and reading on monitor. NC O2 in place and connected to flowmeter. Bed alarm set. Continuing to monitor.

## 2025-01-15 ENCOUNTER — APPOINTMENT (OUTPATIENT)
Dept: CARDIOLOGY | Facility: HOSPITAL | Age: 70
DRG: 252 | End: 2025-01-15
Payer: MEDICARE

## 2025-01-15 ENCOUNTER — APPOINTMENT (OUTPATIENT)
Dept: PRIMARY CARE | Facility: CLINIC | Age: 70
End: 2025-01-15
Payer: MEDICARE

## 2025-01-15 ENCOUNTER — APPOINTMENT (OUTPATIENT)
Dept: DIALYSIS | Facility: HOSPITAL | Age: 70
End: 2025-01-15
Payer: MEDICARE

## 2025-01-15 ENCOUNTER — APPOINTMENT (OUTPATIENT)
Dept: RADIOLOGY | Facility: HOSPITAL | Age: 70
DRG: 252 | End: 2025-01-15
Payer: MEDICARE

## 2025-01-15 DIAGNOSIS — T82.41XA: ICD-10-CM

## 2025-01-15 DIAGNOSIS — Z01.818 ENCOUNTER FOR OTHER PREPROCEDURAL EXAMINATION: ICD-10-CM

## 2025-01-15 DIAGNOSIS — N18.6 END STAGE RENAL DISEASE (MULTI): Primary | ICD-10-CM

## 2025-01-15 LAB
ALBUMIN SERPL BCP-MCNC: 2.8 G/DL (ref 3.4–5)
ANION GAP BLDA CALCULATED.4IONS-SCNC: 2 MMO/L (ref 10–25)
ANION GAP BLDA CALCULATED.4IONS-SCNC: 2 MMO/L (ref 10–25)
ANION GAP SERPL CALCULATED.3IONS-SCNC: 10 MMOL/L (ref 10–20)
APPARATUS: ABNORMAL
APPARATUS: ABNORMAL
APTT PPP: 41.4 SECONDS (ref 22–32.5)
ARTERIAL PATENCY WRIST A: POSITIVE
ARTERIAL PATENCY WRIST A: POSITIVE
BASE EXCESS BLDA CALC-SCNC: 5.8 MMOL/L (ref -2–3)
BASE EXCESS BLDA CALC-SCNC: 8 MMOL/L (ref -2–3)
BASOPHILS # BLD AUTO: 0.02 X10*3/UL (ref 0–0.1)
BASOPHILS # BLD AUTO: 0.04 X10*3/UL (ref 0–0.1)
BASOPHILS NFR BLD AUTO: 0.2 %
BASOPHILS NFR BLD AUTO: 0.5 %
BODY TEMPERATURE: 37 DEGREES CELSIUS
BODY TEMPERATURE: 37 DEGREES CELSIUS
BUN SERPL-MCNC: 8 MG/DL (ref 6–23)
CA-I BLDA-SCNC: 1.18 MMOL/L (ref 1.1–1.33)
CA-I BLDA-SCNC: 1.19 MMOL/L (ref 1.1–1.33)
CALCIUM SERPL-MCNC: 7.7 MG/DL (ref 8.6–10.3)
CHLORIDE BLDA-SCNC: 99 MMOL/L (ref 98–107)
CHLORIDE BLDA-SCNC: 99 MMOL/L (ref 98–107)
CHLORIDE SERPL-SCNC: 98 MMOL/L (ref 98–107)
CO2 SERPL-SCNC: 31 MMOL/L (ref 21–32)
CREAT SERPL-MCNC: 1.56 MG/DL (ref 0.5–1.05)
CRITICAL CALL TIME: 1735
CRITICAL CALLED BY: ABNORMAL
CRITICAL CALLED TO: ABNORMAL
CRITICAL NOTE: ABNORMAL
CRITICAL READ BACK: ABNORMAL
EGFRCR SERPLBLD CKD-EPI 2021: 36 ML/MIN/1.73M*2
EOSINOPHIL # BLD AUTO: 0.04 X10*3/UL (ref 0–0.7)
EOSINOPHIL # BLD AUTO: 0.06 X10*3/UL (ref 0–0.7)
EOSINOPHIL NFR BLD AUTO: 0.5 %
EOSINOPHIL NFR BLD AUTO: 0.7 %
EPAP CMH2O: 8 CM H2O
ERYTHROCYTE [DISTWIDTH] IN BLOOD BY AUTOMATED COUNT: 13.5 % (ref 11.5–14.5)
ERYTHROCYTE [DISTWIDTH] IN BLOOD BY AUTOMATED COUNT: 13.6 % (ref 11.5–14.5)
FLOW: 4 LPM
GLUCOSE BLD MANUAL STRIP-MCNC: 105 MG/DL (ref 74–99)
GLUCOSE BLD MANUAL STRIP-MCNC: 112 MG/DL (ref 74–99)
GLUCOSE BLD MANUAL STRIP-MCNC: 116 MG/DL (ref 74–99)
GLUCOSE BLD MANUAL STRIP-MCNC: 135 MG/DL (ref 74–99)
GLUCOSE BLD MANUAL STRIP-MCNC: 140 MG/DL (ref 74–99)
GLUCOSE BLDA-MCNC: 110 MG/DL (ref 74–99)
GLUCOSE BLDA-MCNC: 120 MG/DL (ref 74–99)
GLUCOSE SERPL-MCNC: 108 MG/DL (ref 74–99)
HCO3 BLDA-SCNC: 34 MMOL/L (ref 22–26)
HCO3 BLDA-SCNC: 34.3 MMOL/L (ref 22–26)
HCT VFR BLD AUTO: 24.5 % (ref 36–46)
HCT VFR BLD AUTO: 28.3 % (ref 36–46)
HCT VFR BLD EST: 25 % (ref 36–46)
HCT VFR BLD EST: 26 % (ref 36–46)
HGB BLD-MCNC: 7.6 G/DL (ref 12–16)
HGB BLD-MCNC: 8.7 G/DL (ref 12–16)
HGB BLDA-MCNC: 8.4 G/DL (ref 12–16)
HGB BLDA-MCNC: 8.6 G/DL (ref 12–16)
IMM GRANULOCYTES # BLD AUTO: 0.08 X10*3/UL (ref 0–0.7)
IMM GRANULOCYTES # BLD AUTO: 0.17 X10*3/UL (ref 0–0.7)
IMM GRANULOCYTES NFR BLD AUTO: 1 % (ref 0–0.9)
IMM GRANULOCYTES NFR BLD AUTO: 2 % (ref 0–0.9)
INHALED O2 CONCENTRATION: 30 %
INHALED O2 CONCENTRATION: 36 %
IPAP CMH2O: 20 CM H2O
LACTATE BLDA-SCNC: 0.5 MMOL/L (ref 0.4–2)
LACTATE BLDA-SCNC: 0.6 MMOL/L (ref 0.4–2)
LYMPHOCYTES # BLD AUTO: 0.59 X10*3/UL (ref 1.2–4.8)
LYMPHOCYTES # BLD AUTO: 0.79 X10*3/UL (ref 1.2–4.8)
LYMPHOCYTES NFR BLD AUTO: 7.1 %
LYMPHOCYTES NFR BLD AUTO: 9.1 %
MAGNESIUM SERPL-MCNC: 1.68 MG/DL (ref 1.6–2.4)
MCH RBC QN AUTO: 31 PG (ref 26–34)
MCH RBC QN AUTO: 31 PG (ref 26–34)
MCHC RBC AUTO-ENTMCNC: 30.7 G/DL (ref 32–36)
MCHC RBC AUTO-ENTMCNC: 31 G/DL (ref 32–36)
MCV RBC AUTO: 100 FL (ref 80–100)
MCV RBC AUTO: 101 FL (ref 80–100)
MONOCYTES # BLD AUTO: 0.46 X10*3/UL (ref 0.1–1)
MONOCYTES # BLD AUTO: 0.65 X10*3/UL (ref 0.1–1)
MONOCYTES NFR BLD AUTO: 5.5 %
MONOCYTES NFR BLD AUTO: 7.5 %
NEUTROPHILS # BLD AUTO: 6.97 X10*3/UL (ref 1.2–7.7)
NEUTROPHILS # BLD AUTO: 7.1 X10*3/UL (ref 1.2–7.7)
NEUTROPHILS NFR BLD AUTO: 80.5 %
NEUTROPHILS NFR BLD AUTO: 85.4 %
NRBC BLD-RTO: 0 /100 WBCS (ref 0–0)
NRBC BLD-RTO: 0.2 /100 WBCS (ref 0–0)
OXYHGB MFR BLDA: 94.6 % (ref 94–98)
OXYHGB MFR BLDA: 95.9 % (ref 94–98)
PCO2 BLDA: 58 MM HG (ref 38–42)
PCO2 BLDA: 74 MM HG (ref 38–42)
PH BLDA: 7.27 PH (ref 7.38–7.42)
PH BLDA: 7.38 PH (ref 7.38–7.42)
PHOSPHATE SERPL-MCNC: 1.8 MG/DL (ref 2.5–4.9)
PLATELET # BLD AUTO: 146 X10*3/UL (ref 150–450)
PLATELET # BLD AUTO: 159 X10*3/UL (ref 150–450)
PO2 BLDA: 71 MM HG (ref 85–95)
PO2 BLDA: 74 MM HG (ref 85–95)
POTASSIUM BLDA-SCNC: 3.4 MMOL/L (ref 3.5–5.3)
POTASSIUM BLDA-SCNC: 3.6 MMOL/L (ref 3.5–5.3)
POTASSIUM SERPL-SCNC: 3.8 MMOL/L (ref 3.5–5.3)
RBC # BLD AUTO: 2.45 X10*6/UL (ref 4–5.2)
RBC # BLD AUTO: 2.81 X10*6/UL (ref 4–5.2)
SAO2 % BLDA: 96 % (ref 94–100)
SAO2 % BLDA: 98 % (ref 94–100)
SODIUM BLDA-SCNC: 132 MMOL/L (ref 136–145)
SODIUM BLDA-SCNC: 132 MMOL/L (ref 136–145)
SODIUM SERPL-SCNC: 135 MMOL/L (ref 136–145)
SPECIMEN DRAWN FROM PATIENT: ABNORMAL
SPECIMEN DRAWN FROM PATIENT: ABNORMAL
VENTILATOR MODE: ABNORMAL
VENTILATOR RATE: 16 BPM
WBC # BLD AUTO: 8.3 X10*3/UL (ref 4.4–11.3)
WBC # BLD AUTO: 8.7 X10*3/UL (ref 4.4–11.3)

## 2025-01-15 PROCEDURE — 36558 INSERT TUNNELED CV CATH: CPT | Performed by: RADIOLOGY

## 2025-01-15 PROCEDURE — 94668 MNPJ CHEST WALL SBSQ: CPT

## 2025-01-15 PROCEDURE — 36558 INSERT TUNNELED CV CATH: CPT

## 2025-01-15 PROCEDURE — 77001 FLUOROGUIDE FOR VEIN DEVICE: CPT | Performed by: RADIOLOGY

## 2025-01-15 PROCEDURE — 71045 X-RAY EXAM CHEST 1 VIEW: CPT | Performed by: STUDENT IN AN ORGANIZED HEALTH CARE EDUCATION/TRAINING PROGRAM

## 2025-01-15 PROCEDURE — 2500000005 HC RX 250 GENERAL PHARMACY W/O HCPCS

## 2025-01-15 PROCEDURE — 2500000004 HC RX 250 GENERAL PHARMACY W/ HCPCS (ALT 636 FOR OP/ED)

## 2025-01-15 PROCEDURE — 36600 WITHDRAWAL OF ARTERIAL BLOOD: CPT

## 2025-01-15 PROCEDURE — 99232 SBSQ HOSP IP/OBS MODERATE 35: CPT | Performed by: NURSE PRACTITIONER

## 2025-01-15 PROCEDURE — 94640 AIRWAY INHALATION TREATMENT: CPT

## 2025-01-15 PROCEDURE — 2020000001 HC ICU ROOM DAILY

## 2025-01-15 PROCEDURE — 36415 COLL VENOUS BLD VENIPUNCTURE: CPT

## 2025-01-15 PROCEDURE — 7100000009 HC PHASE TWO TIME - INITIAL BASE CHARGE

## 2025-01-15 PROCEDURE — 94660 CPAP INITIATION&MGMT: CPT

## 2025-01-15 PROCEDURE — 99152 MOD SED SAME PHYS/QHP 5/>YRS: CPT

## 2025-01-15 PROCEDURE — 2500000001 HC RX 250 WO HCPCS SELF ADMINISTERED DRUGS (ALT 637 FOR MEDICARE OP)

## 2025-01-15 PROCEDURE — 71045 X-RAY EXAM CHEST 1 VIEW: CPT

## 2025-01-15 PROCEDURE — 85025 COMPLETE CBC W/AUTO DIFF WBC: CPT

## 2025-01-15 PROCEDURE — 84132 ASSAY OF SERUM POTASSIUM: CPT | Performed by: NURSE PRACTITIONER

## 2025-01-15 PROCEDURE — 7100000010 HC PHASE TWO TIME - EACH INCREMENTAL 1 MINUTE

## 2025-01-15 PROCEDURE — 85025 COMPLETE CBC W/AUTO DIFF WBC: CPT | Performed by: INTERNAL MEDICINE

## 2025-01-15 PROCEDURE — 2500000002 HC RX 250 W HCPCS SELF ADMINISTERED DRUGS (ALT 637 FOR MEDICARE OP, ALT 636 FOR OP/ED): Performed by: HOSPITALIST

## 2025-01-15 PROCEDURE — 85730 THROMBOPLASTIN TIME PARTIAL: CPT

## 2025-01-15 PROCEDURE — C1894 INTRO/SHEATH, NON-LASER: HCPCS

## 2025-01-15 PROCEDURE — 76937 US GUIDE VASCULAR ACCESS: CPT | Performed by: RADIOLOGY

## 2025-01-15 PROCEDURE — 2500000002 HC RX 250 W HCPCS SELF ADMINISTERED DRUGS (ALT 637 FOR MEDICARE OP, ALT 636 FOR OP/ED)

## 2025-01-15 PROCEDURE — 2500000004 HC RX 250 GENERAL PHARMACY W/ HCPCS (ALT 636 FOR OP/ED): Performed by: RADIOLOGY

## 2025-01-15 PROCEDURE — 77001 FLUOROGUIDE FOR VEIN DEVICE: CPT

## 2025-01-15 PROCEDURE — 84132 ASSAY OF SERUM POTASSIUM: CPT

## 2025-01-15 PROCEDURE — 2500000005 HC RX 250 GENERAL PHARMACY W/O HCPCS: Performed by: HOSPITALIST

## 2025-01-15 PROCEDURE — 83735 ASSAY OF MAGNESIUM: CPT

## 2025-01-15 PROCEDURE — 99291 CRITICAL CARE FIRST HOUR: CPT

## 2025-01-15 PROCEDURE — 0JH63XZ INSERTION OF TUNNELED VASCULAR ACCESS DEVICE INTO CHEST SUBCUTANEOUS TISSUE AND FASCIA, PERCUTANEOUS APPROACH: ICD-10-PCS | Performed by: RADIOLOGY

## 2025-01-15 PROCEDURE — 76937 US GUIDE VASCULAR ACCESS: CPT

## 2025-01-15 PROCEDURE — 02HV33Z INSERTION OF INFUSION DEVICE INTO SUPERIOR VENA CAVA, PERCUTANEOUS APPROACH: ICD-10-PCS | Performed by: RADIOLOGY

## 2025-01-15 PROCEDURE — 9420000001 HC RT PATIENT EDUCATION 5 MIN

## 2025-01-15 PROCEDURE — 82947 ASSAY GLUCOSE BLOOD QUANT: CPT

## 2025-01-15 PROCEDURE — 2500000005 HC RX 250 GENERAL PHARMACY W/O HCPCS: Performed by: RADIOLOGY

## 2025-01-15 PROCEDURE — 8010000002 HC DIALYSIS - HEMODIALYSIS PER DAY - IP  COMMUNITY HOSPITALS

## 2025-01-15 PROCEDURE — C1769 GUIDE WIRE: HCPCS

## 2025-01-15 PROCEDURE — 2500000004 HC RX 250 GENERAL PHARMACY W/ HCPCS (ALT 636 FOR OP/ED): Mod: JZ

## 2025-01-15 PROCEDURE — 2720000007 HC OR 272 NO HCPCS

## 2025-01-15 RX ORDER — FENTANYL CITRATE 50 UG/ML
INJECTION, SOLUTION INTRAMUSCULAR; INTRAVENOUS AS NEEDED
Status: DISCONTINUED | OUTPATIENT
Start: 2025-01-15 | End: 2025-01-15 | Stop reason: HOSPADM

## 2025-01-15 RX ORDER — HEPARIN SODIUM 1000 [USP'U]/ML
INJECTION, SOLUTION INTRAVENOUS; SUBCUTANEOUS AS NEEDED
Status: DISCONTINUED | OUTPATIENT
Start: 2025-01-15 | End: 2025-01-15 | Stop reason: HOSPADM

## 2025-01-15 RX ORDER — MIDAZOLAM HYDROCHLORIDE 1 MG/ML
INJECTION, SOLUTION INTRAMUSCULAR; INTRAVENOUS AS NEEDED
Status: DISCONTINUED | OUTPATIENT
Start: 2025-01-15 | End: 2025-01-15 | Stop reason: HOSPADM

## 2025-01-15 RX ORDER — LIDOCAINE HYDROCHLORIDE 10 MG/ML
INJECTION, SOLUTION EPIDURAL; INFILTRATION; INTRACAUDAL; PERINEURAL AS NEEDED
Status: DISCONTINUED | OUTPATIENT
Start: 2025-01-15 | End: 2025-01-15 | Stop reason: HOSPADM

## 2025-01-15 RX ORDER — CALCIUM GLUCONATE 20 MG/ML
2 INJECTION, SOLUTION INTRAVENOUS ONCE
Status: COMPLETED | OUTPATIENT
Start: 2025-01-15 | End: 2025-01-16

## 2025-01-15 RX ORDER — HEPARIN SODIUM 5000 [USP'U]/ML
5000 INJECTION, SOLUTION INTRAVENOUS; SUBCUTANEOUS EVERY 8 HOURS SCHEDULED
Status: DISCONTINUED | OUTPATIENT
Start: 2025-01-15 | End: 2025-01-21 | Stop reason: HOSPADM

## 2025-01-15 RX ORDER — MAGNESIUM SULFATE HEPTAHYDRATE 40 MG/ML
2 INJECTION, SOLUTION INTRAVENOUS ONCE
Status: COMPLETED | OUTPATIENT
Start: 2025-01-15 | End: 2025-01-15

## 2025-01-15 RX ADMIN — ATORVASTATIN CALCIUM 40 MG: 40 TABLET, FILM COATED ORAL at 21:17

## 2025-01-15 RX ADMIN — FENTANYL CITRATE 50 MCG: 0.05 INJECTION, SOLUTION INTRAMUSCULAR; INTRAVENOUS at 15:40

## 2025-01-15 RX ADMIN — Medication 4 L/MIN: at 07:39

## 2025-01-15 RX ADMIN — Medication 3 L/MIN: at 15:01

## 2025-01-15 RX ADMIN — IPRATROPIUM BROMIDE AND ALBUTEROL SULFATE 3 ML: 2.5; .5 SOLUTION RESPIRATORY (INHALATION) at 19:43

## 2025-01-15 RX ADMIN — HEPARIN SODIUM 1.9 UNITS: 1000 INJECTION INTRAVENOUS; SUBCUTANEOUS at 15:54

## 2025-01-15 RX ADMIN — MIDAZOLAM 1 MG: 1 INJECTION INTRAMUSCULAR; INTRAVENOUS at 15:40

## 2025-01-15 RX ADMIN — SODIUM PHOSPHATE, MONOBASIC, MONOHYDRATE AND SODIUM PHOSPHATE, DIBASIC, ANHYDROUS 21 MMOL: 276; 142 INJECTION, SOLUTION INTRAVENOUS at 22:14

## 2025-01-15 RX ADMIN — IPRATROPIUM BROMIDE AND ALBUTEROL SULFATE 3 ML: 2.5; .5 SOLUTION RESPIRATORY (INHALATION) at 07:39

## 2025-01-15 RX ADMIN — MAGNESIUM SULFATE HEPTAHYDRATE 2 G: 40 INJECTION, SOLUTION INTRAVENOUS at 21:34

## 2025-01-15 RX ADMIN — Medication 3 ML: at 07:39

## 2025-01-15 RX ADMIN — HEPARIN SODIUM 5000 UNITS: 5000 INJECTION, SOLUTION INTRAVENOUS; SUBCUTANEOUS at 21:17

## 2025-01-15 RX ADMIN — LEVOTHYROXINE SODIUM 200 MCG: 0.1 TABLET ORAL at 05:43

## 2025-01-15 RX ADMIN — CALCIUM GLUCONATE 2 G: 20 INJECTION, SOLUTION INTRAVENOUS at 23:23

## 2025-01-15 RX ADMIN — LEVOTHYROXINE SODIUM 50 MCG: 0.05 TABLET ORAL at 05:44

## 2025-01-15 RX ADMIN — Medication 3 ML: at 19:43

## 2025-01-15 RX ADMIN — Medication 30 PERCENT: at 19:43

## 2025-01-15 RX ADMIN — PANTOPRAZOLE SODIUM 40 MG: 40 INJECTION, POWDER, FOR SOLUTION INTRAVENOUS at 21:17

## 2025-01-15 RX ADMIN — LIDOCAINE HYDROCHLORIDE 20 ML: 10 INJECTION, SOLUTION EPIDURAL; INFILTRATION; INTRACAUDAL; PERINEURAL at 15:40

## 2025-01-15 RX ADMIN — ACETAMINOPHEN 650 MG: 325 TABLET ORAL at 22:25

## 2025-01-15 ASSESSMENT — COGNITIVE AND FUNCTIONAL STATUS - GENERAL
TURNING FROM BACK TO SIDE WHILE IN FLAT BAD: A LITTLE
MOVING TO AND FROM BED TO CHAIR: A LOT
TOILETING: A LITTLE
HELP NEEDED FOR BATHING: A LOT
MOBILITY SCORE: 9
HELP NEEDED FOR BATHING: A LOT
DRESSING REGULAR LOWER BODY CLOTHING: A LOT
DRESSING REGULAR UPPER BODY CLOTHING: A LITTLE
DRESSING REGULAR LOWER BODY CLOTHING: A LOT
STANDING UP FROM CHAIR USING ARMS: A LOT
CLIMB 3 TO 5 STEPS WITH RAILING: TOTAL
STANDING UP FROM CHAIR USING ARMS: TOTAL
WALKING IN HOSPITAL ROOM: TOTAL
CLIMB 3 TO 5 STEPS WITH RAILING: TOTAL
DAILY ACTIVITIY SCORE: 17
MOVING TO AND FROM BED TO CHAIR: A LOT
DRESSING REGULAR UPPER BODY CLOTHING: A LOT
MOBILITY SCORE: 12
PERSONAL GROOMING: A LOT
DAILY ACTIVITIY SCORE: 13
EATING MEALS: A LITTLE
MOVING FROM LYING ON BACK TO SITTING ON SIDE OF FLAT BED WITH BEDRAILS: A LITTLE
TURNING FROM BACK TO SIDE WHILE IN FLAT BAD: A LOT
MOVING FROM LYING ON BACK TO SITTING ON SIDE OF FLAT BED WITH BEDRAILS: A LOT
PERSONAL GROOMING: A LITTLE
TOILETING: A LOT
WALKING IN HOSPITAL ROOM: TOTAL

## 2025-01-15 ASSESSMENT — PAIN SCALES - GENERAL
PAINLEVEL_OUTOF10: 0 - NO PAIN

## 2025-01-15 ASSESSMENT — PAIN - FUNCTIONAL ASSESSMENT
PAIN_FUNCTIONAL_ASSESSMENT: 0-10

## 2025-01-15 NOTE — CARE PLAN
The patient's goals for the shift include      The clinical goals for the shift include Safety, Monitor for bleeding

## 2025-01-15 NOTE — PROGRESS NOTES
Pt to have dialysis today, if all goes well she might be cleared for dc today, [facility] made Ilene Brunner aware. [facility] requested for precert to be started to direct submit team.    Update: Precert approved.     Pending: Medical clearance.     01/15/25 0957   Discharge Planning   Expected Discharge Disposition SNF  (Ilene Brunner)

## 2025-01-15 NOTE — PROGRESS NOTES
Daphne Morris is a 69 y.o. female on day 5 of admission presenting with Clotted renal dialysis AV graft (CMS-[facility]).      Subjective   Patient seen and examined.  Patient is back from HD and IR. Right upper arm dialysis graft clothed off again. Patient was taken to IR for tunneled catheter placement. Patient is lethargic after procedure, no new c/o.   Objective     Last Recorded Vitals  /69   Pulse 104   Temp 36.5 °C (97.7 °F)   Resp 16   Wt 70.3 kg (154 lb 15.7 oz)   SpO2 99%   Intake/Output last 3 Shifts:    Intake/Output Summary (Last 24 hours) at 1/15/2025 1701  Last data filed at 1/15/2025 1557  Gross per 24 hour   Intake 1292.77 ml   Output 2811 ml   Net -1518.23 ml       Admission Weight  Weight: 70 kg (154 lb 5.2 oz) (01/11/25 0537)    Daily Weight  01/15/25 : 70.3 kg (154 lb 15.7 oz)          Physical Exam  Vitals reviewed.   Constitutional:       Appearance: She is ill-appearing.   HENT:      Head: Normocephalic.      Nose: Nose normal.   Eyes:      Extraocular Movements: Extraocular movements intact.   Cardiovascular:      Rate and Rhythm: Regular rhythm.   Pulmonary:      Effort: Pulmonary effort is normal.   Abdominal:      General: Bowel sounds are normal.   Musculoskeletal:      Cervical back: Neck supple.      Comments: Right UE dialysis access    Skin:     General: Skin is warm.   Neurological:      Mental Status: She is alert.      Comments: S/p IR procedure, patient is lethargic          Assessment/Plan       This patient has a central line   Reason for the central line remaining today? Dialysis/Hemapheresis    Daphne Morris is a 69 year old female patient with pmhx of ESRD, anemia, afib, chf, copd, CVA, depression dm, chronic hypoxic respiratory failure on 3L O2 at home, gerd hyperlipidemia, hypertension, hypothyroid, renal cell carcinoma s/p resection in 8/21 and vascultitis. Patient was admitted 1/9 at [facility] for clotted AV fistula and transferred to [facility] to be seen by vascular  surgery. Patient on arrival was lethargic and not responding. ABG was ordered showing hypercapnic respiratory acidosis with hypoxemia, patient was placed on bipap and transferred to ICU. Labs on admission also concerning for hyponatremia, hypoglycemia.     Assessment & Plan  Clotted renal dialysis AV graft (CMS-HCC)  Plavix on hold  Vascular surgery following  Tunneled catheter placed today 1/15    ESRD (end stage renal disease) (Multi)  Consult nephrology (Dr. Mitchell)  Dialysis days are Tuesday, Thursday, and Saturday    Respiratory acidosis with acute on chronic hypoxic respiratory failure  Much improved has been transferred out of ICU  Patient at baseline on 3 L of nasal cannula    Metabolic encephalopathy  Status post IR procedure patient is somewhat lethargic  Monitor closely    Hypertension  Continue home amlodipine    Atrial fibrillation  Not on anticoagulation    Hyperlipidemia  Continue home atorvastatin    Hypothyroid  Continue home levothyroxine    Chronic diastolic heart failure  Bradycardia  Patient was on metoprolol succinate 50 mg twice daily and was quite bradycardic  Will change to 50 mg once daily with parameters to hold for heart rate less than 60 or systolic blood pressure less than 100    Type 2 diabetes mellitus  Hypoglycemia protocol  Accu-Cheks before meals and at bedtime  Insulin sliding scale  Hemoglobin A1c 8.1 on January 1, 2025    Disposition    Anticipated discharge to SNF.   S/p tunneled HD catheter placement, patient is lethargic-> will check ABG        Jose Cisneros, CARLENE-CNP

## 2025-01-15 NOTE — ASSESSMENT & PLAN NOTE
Consult nephrology (Dr. Mitchell)  Dialysis days are Tuesday, Thursday, and Saturday    Respiratory acidosis with acute on chronic hypoxic respiratory failure  Much improved has been transferred out of ICU  Patient at baseline on 3 L of nasal cannula    Metabolic encephalopathy  Status post IR procedure patient is somewhat lethargic  Monitor closely    Hypertension  Continue home amlodipine    Atrial fibrillation  Not on anticoagulation    Hyperlipidemia  Continue home atorvastatin    Hypothyroid  Continue home levothyroxine    Chronic diastolic heart failure  Bradycardia  Patient was on metoprolol succinate 50 mg twice daily and was quite bradycardic  Will change to 50 mg once daily with parameters to hold for heart rate less than 60 or systolic blood pressure less than 100    Type 2 diabetes mellitus  Hypoglycemia protocol  Accu-Cheks before meals and at bedtime  Insulin sliding scale  Hemoglobin A1c 8.1 on January 1, 2025    Disposition    Anticipated discharge to SNF.   S/p tunneled HD catheter placement, patient is lethargic-> will check ABG

## 2025-01-15 NOTE — PROGRESS NOTES
Physical Therapy                 Therapy Communication Note    Patient Name: Daphne Morris  MRN: [id number]  Department: [facility] DIALYSIS  Room: [address] [place] [address]A  Today's Date: 1/15/2025     Discipline: Physical Therapy    PT Missed Visit: Yes     Missed Visit Reason: Missed Visit Reason: Patient in a medical procedure (Patient currently receiving dialysis treatment.)    Missed Time: Cancel

## 2025-01-15 NOTE — PROGRESS NOTES
CONSULT PROGRESS NOTES    SERVICE DATE: 1/15/2025   SERVICE TIME: 10:44 AM    CONSULTING SERVICE: Nephrology    ASSESSMENT AND PLAN   69-year-old woman in on dialysis admitted with clotted arteriovenous graft, now alteration in mental status.  1.  End-stage renal disease  2.  Malfunctioning AVG  3.  Edema  4.  Fluid overload  5.  Essential hypertension  6.  Anemia of chronic kidney disease     Status post stent placement, now the AVG seems to be reclotted and is not working on dialysis.  I am going to request interventional radiology to convert the acute catheter to a tunneled line.  I we will request vascular surgery to weigh in on the case as well.    Interventional radiology to place tunneled line, remove acute catheter, no need for anticoagulation at this point.  Improvement in the fluid overload state.  Attempt 3 L volume removal today.  Erythropoietin stimulating agents to be given with dialysis.  There is no need for daily routine chemistry in this dialysis patient.  I will follow her.  Case discussed with Leodan Cisneros CNP.  We need working arteriovenous access prior to safe discharge.    SUBJECTIVE  INTERVAL HPI: I saw her during her hemodialysis treatment.  She was cannulated via her graft that recently had stent placement, it worked for a little bit, but then stopped working.  Now there is no bruit or thrill.  The dialysis technician appropriately began using her acute hemodialysis catheter in her right internal jugular area.    MEDICATIONS:  amiodarone, 200 mg, oral, Daily with breakfast  amLODIPine, 5 mg, oral, Daily  aspirin, 81 mg, oral, Daily  atorvastatin, 40 mg, oral, Nightly  [Held by provider] clopidogrel, 75 mg, oral, Daily  epoetin debra or biosimilar, 5,000 Units, subcutaneous, Once per day on Monday Wednesday Friday  heparin, 2,000 Units, intra-catheter, After Dialysis  heparin, 2,000 Units, intra-catheter, After Dialysis  insulin lispro, 0-5 Units, subcutaneous, TID AC  ipratropium-albuteroL, 3  mL, nebulization, 4x daily  levothyroxine, 200 mcg, oral, Daily  levothyroxine, 50 mcg, oral, Daily  metoprolol succinate XL, 50 mg, oral, Daily  oxygen, , inhalation, Continuous - Inhalation  pantoprazole, 40 mg, intravenous, BID  sodium chloride, 3 mL, nebulization, 4x daily  venlafaxine XR, 150 mg, oral, Daily             PRN medications: acetaminophen **OR** acetaminophen **OR** acetaminophen, albuterol, benzocaine-menthol, bisacodyl, dextromethorphan-guaifenesin, dextrose, dextrose, glucagon, glucagon, guaiFENesin, heparin (porcine), melatonin, polyethylene glycol, prochlorperazine **OR** prochlorperazine **OR** prochlorperazine     OBJECTIVE  PHYSICAL EXAM:   Heart Rate:  [70-76]   Temp:  [36.7 °C (98.1 °F)-37.1 °C (98.8 °F)]   Resp:  [17-24]   BP: (119-141)/(46-56)   Weight:  [70.3 kg (154 lb 15.7 oz)]   SpO2:  [94 %-100 %]   Body mass index is 25.01 kg/m².  This is a chronically ill-appearing woman, seems much older than her known age  Very pale skin  Hearing intact  Phonation intact  Moist mucosa  Normal S1/normal S2  Lungs are clear anteriorly, increased work of breathing  Abdomen is soft, nondistended, nontender, positive bowel sounds  No Fay catheter in place, no suprapubic tenderness to palpation  Trace bilateral lower extremity edema  She has a right upper extremity graft and has an extensive bandage over it with significant blood on the bandage.  There is a faint bruit and thrill.  Moves 4 limbs spontaneously  No obvious joint deformities  No lymphadenopathy    DATA:   Labs:  Results for orders placed or performed during the hospital encounter of 01/10/25 (from the past 96 hours)   POCT GLUCOSE   Result Value Ref Range    POCT Glucose 99 74 - 99 mg/dL   POCT GLUCOSE   Result Value Ref Range    POCT Glucose 145 (H) 74 - 99 mg/dL   aPTT   Result Value Ref Range    aPTT 60.2 (H) 22.0 - 32.5 seconds   POCT GLUCOSE   Result Value Ref Range    POCT Glucose 141 (H) 74 - 99 mg/dL   aPTT   Result Value Ref  Range    aPTT 53.2 (H) 22.0 - 32.5 seconds   POCT GLUCOSE   Result Value Ref Range    POCT Glucose 108 (H) 74 - 99 mg/dL   POCT GLUCOSE   Result Value Ref Range    POCT Glucose 91 74 - 99 mg/dL   Calcium, Ionized   Result Value Ref Range    POCT Calcium, Ionized 1.05 (L) 1.1 - 1.33 mmol/L   Magnesium   Result Value Ref Range    Magnesium 1.95 1.60 - 2.40 mg/dL   Renal function panel   Result Value Ref Range    Glucose 84 74 - 99 mg/dL    Sodium 134 (L) 136 - 145 mmol/L    Potassium 3.5 3.5 - 5.3 mmol/L    Chloride 97 (L) 98 - 107 mmol/L    Bicarbonate 30 21 - 32 mmol/L    Anion Gap 11 10 - 20 mmol/L    Urea Nitrogen 30 (H) 6 - 23 mg/dL    Creatinine 2.93 (H) 0.50 - 1.05 mg/dL    eGFR 17 (L) >60 mL/min/1.73m*2    Calcium 7.5 (L) 8.6 - 10.3 mg/dL    Phosphorus 2.5 2.5 - 4.9 mg/dL    Albumin 2.6 (L) 3.4 - 5.0 g/dL   CBC   Result Value Ref Range    WBC 13.0 (H) 4.4 - 11.3 x10*3/uL    nRBC 0.0 0.0 - 0.0 /100 WBCs    RBC 3.09 (L) 4.00 - 5.20 x10*6/uL    Hemoglobin 9.6 (L) 12.0 - 16.0 g/dL    Hematocrit 30.0 (L) 36.0 - 46.0 %    MCV 97 80 - 100 fL    MCH 31.1 26.0 - 34.0 pg    MCHC 32.0 32.0 - 36.0 g/dL    RDW 13.4 11.5 - 14.5 %    Platelets 188 150 - 450 x10*3/uL   aPTT   Result Value Ref Range    aPTT 35.6 (H) 22.0 - 32.5 seconds   POCT GLUCOSE   Result Value Ref Range    POCT Glucose 87 74 - 99 mg/dL   POCT GLUCOSE   Result Value Ref Range    POCT Glucose 104 (H) 74 - 99 mg/dL   aPTT   Result Value Ref Range    aPTT 38.2 (H) 22.0 - 32.5 seconds   POCT GLUCOSE   Result Value Ref Range    POCT Glucose 93 74 - 99 mg/dL   POCT GLUCOSE   Result Value Ref Range    POCT Glucose 125 (H) 74 - 99 mg/dL   Calcium, Ionized   Result Value Ref Range    POCT Calcium, Ionized 1.08 (L) 1.1 - 1.33 mmol/L   Magnesium   Result Value Ref Range    Magnesium 1.87 1.60 - 2.40 mg/dL   Renal function panel   Result Value Ref Range    Glucose 83 74 - 99 mg/dL    Sodium 132 (L) 136 - 145 mmol/L    Potassium 3.7 3.5 - 5.3 mmol/L    Chloride 96 (L)  98 - 107 mmol/L    Bicarbonate 30 21 - 32 mmol/L    Anion Gap 10 10 - 20 mmol/L    Urea Nitrogen 38 (H) 6 - 23 mg/dL    Creatinine 3.47 (H) 0.50 - 1.05 mg/dL    eGFR 14 (L) >60 mL/min/1.73m*2    Calcium 7.6 (L) 8.6 - 10.3 mg/dL    Phosphorus 3.0 2.5 - 4.9 mg/dL    Albumin 2.6 (L) 3.4 - 5.0 g/dL   APTT   Result Value Ref Range    aPTT 43.5 (H) 22.0 - 32.5 seconds   POCT GLUCOSE   Result Value Ref Range    POCT Glucose 92 74 - 99 mg/dL   POCT GLUCOSE   Result Value Ref Range    POCT Glucose 148 (H) 74 - 99 mg/dL   aPTT   Result Value Ref Range    aPTT 34.2 (H) 22.0 - 32.5 seconds   POCT GLUCOSE   Result Value Ref Range    POCT Glucose 94 74 - 99 mg/dL   aPTT   Result Value Ref Range    aPTT 64.1 (H) 22.0 - 32.5 seconds   Lavender Top   Result Value Ref Range    Extra Tube Hold for add-ons.    PST Top   Result Value Ref Range    Extra Tube Hold for add-ons.    Basic metabolic panel   Result Value Ref Range    Glucose 54 (LL) 74 - 99 mg/dL    Sodium 136 136 - 145 mmol/L    Potassium 3.9 3.5 - 5.3 mmol/L    Chloride 100 98 - 107 mmol/L    Bicarbonate 32 21 - 32 mmol/L    Anion Gap 8 (L) 10 - 20 mmol/L    Urea Nitrogen 19 6 - 23 mg/dL    Creatinine 2.39 (H) 0.50 - 1.05 mg/dL    eGFR 21 (L) >60 mL/min/1.73m*2    Calcium 7.8 (L) 8.6 - 10.3 mg/dL   CBC and Auto Differential   Result Value Ref Range    WBC 10.7 4.4 - 11.3 x10*3/uL    nRBC 0.0 0.0 - 0.0 /100 WBCs    RBC 2.74 (L) 4.00 - 5.20 x10*6/uL    Hemoglobin 8.6 (L) 12.0 - 16.0 g/dL    Hematocrit 27.4 (L) 36.0 - 46.0 %     80 - 100 fL    MCH 31.4 26.0 - 34.0 pg    MCHC 31.4 (L) 32.0 - 36.0 g/dL    RDW 13.6 11.5 - 14.5 %    Platelets 157 150 - 450 x10*3/uL    Neutrophils % 86.6 40.0 - 80.0 %    Immature Granulocytes %, Automated 0.6 0.0 - 0.9 %    Lymphocytes % 6.3 13.0 - 44.0 %    Monocytes % 6.0 2.0 - 10.0 %    Eosinophils % 0.3 0.0 - 6.0 %    Basophils % 0.2 0.0 - 2.0 %    Neutrophils Absolute 9.28 (H) 1.20 - 7.70 x10*3/uL    Immature Granulocytes Absolute,  Automated 0.06 0.00 - 0.70 x10*3/uL    Lymphocytes Absolute 0.68 (L) 1.20 - 4.80 x10*3/uL    Monocytes Absolute 0.64 0.10 - 1.00 x10*3/uL    Eosinophils Absolute 0.03 0.00 - 0.70 x10*3/uL    Basophils Absolute 0.02 0.00 - 0.10 x10*3/uL   POCT GLUCOSE   Result Value Ref Range    POCT Glucose 60 (L) 74 - 99 mg/dL   POCT GLUCOSE   Result Value Ref Range    POCT Glucose 69 (L) 74 - 99 mg/dL   POCT GLUCOSE   Result Value Ref Range    POCT Glucose 107 (H) 74 - 99 mg/dL   aPTT   Result Value Ref Range    aPTT 55.3 (H) 22.0 - 32.5 seconds   POCT GLUCOSE   Result Value Ref Range    POCT Glucose 127 (H) 74 - 99 mg/dL   POCT GLUCOSE   Result Value Ref Range    POCT Glucose 302 (H) 74 - 99 mg/dL   aPTT   Result Value Ref Range    aPTT 48.1 (H) 22.0 - 32.5 seconds   POCT GLUCOSE   Result Value Ref Range    POCT Glucose 199 (H) 74 - 99 mg/dL   CBC and Auto Differential   Result Value Ref Range    WBC 8.7 4.4 - 11.3 x10*3/uL    nRBC 0.0 0.0 - 0.0 /100 WBCs    RBC 2.45 (L) 4.00 - 5.20 x10*6/uL    Hemoglobin 7.6 (L) 12.0 - 16.0 g/dL    Hematocrit 24.5 (L) 36.0 - 46.0 %     80 - 100 fL    MCH 31.0 26.0 - 34.0 pg    MCHC 31.0 (L) 32.0 - 36.0 g/dL    RDW 13.5 11.5 - 14.5 %    Platelets 146 (L) 150 - 450 x10*3/uL    Neutrophils % 80.5 40.0 - 80.0 %    Immature Granulocytes %, Automated 2.0 (H) 0.0 - 0.9 %    Lymphocytes % 9.1 13.0 - 44.0 %    Monocytes % 7.5 2.0 - 10.0 %    Eosinophils % 0.7 0.0 - 6.0 %    Basophils % 0.2 0.0 - 2.0 %    Neutrophils Absolute 6.97 1.20 - 7.70 x10*3/uL    Immature Granulocytes Absolute, Automated 0.17 0.00 - 0.70 x10*3/uL    Lymphocytes Absolute 0.79 (L) 1.20 - 4.80 x10*3/uL    Monocytes Absolute 0.65 0.10 - 1.00 x10*3/uL    Eosinophils Absolute 0.06 0.00 - 0.70 x10*3/uL    Basophils Absolute 0.02 0.00 - 0.10 x10*3/uL   aPTT   Result Value Ref Range    aPTT 41.4 (H) 22.0 - 32.5 seconds   POCT GLUCOSE   Result Value Ref Range    POCT Glucose 140 (H) 74 - 99 mg/dL     *Note: Due to a large number of  results and/or encounters for the requested time period, some results have not been displayed. A complete set of results can be found in Results Review.         SIGNATURE: Joseph Mitchell MD PATIENT NAME: Daphne Morris   DATE: January 15, 2025 MRN: [id number]   TIME: 10:44 AM PAGER: [phone]

## 2025-01-15 NOTE — NURSING NOTE
Pt return from dialysis cath placement reoriented to room call system etc. R chest tunneled cath intact

## 2025-01-15 NOTE — PRE-PROCEDURE NOTE
Report from Sending RN:    Report From: Amisha Sal  Recent Surgery of Procedure: No  Baseline Level of Consciousness (LOC): 4  Oxygen Use: Yes  Type: 4 liters   Diabetic: Yes  Last BP Med Given Day of Dialysis: mar pre vs 134/54   Last Pain Med Given: mar   Lab Tests to be Obtained with Dialysis: No  Blood Transfusion to be Given During Dialysis: No  Available IV Access: Yes  Medications to be Administered During Dialysis: No  Continuous IV Infusion Running: No  Restraints on Currently or in the Last 24 Hours: No    Hand-Off Communication: stable for hd tx in hd room. Per orders 3 liters fluid removal, and want to attempt to use avg now that its declotted     Dialysis Catheter Dressing: will assess  Last Dressing Change: will assess

## 2025-01-15 NOTE — NURSING NOTE
Pt placed on bipap as ordered per Dr Haque  20/8 rate 16 fio2 30%. Pt arousable  opens eyes  momentarily to to speech  will be transferred to icu

## 2025-01-15 NOTE — POST-PROCEDURE NOTE
Report to Receiving RN:    Report To: iam   Time Report Called: [id number]   Hand-Off Communication: Yes Approx 20 minutes into tx patients avg stopped working, I attached syringe to access with no push or pull, checked access for bruit and thrill and very faint. Nonexistent from when I assessed at start of tx, I attempted another cannulation with no success,  arrived to bedside verified no bruit or thrill, patient sent to IR at 1330  2.8 liters removed post vss 125/78  Complications During Treatment: Yes, stated above   Ultrafiltration Treatment: No  Medications Administered During Dialysis: No  Blood Products Administered During Dialysis: No  Labs Sent During Dialysis: No  Heparin Drip Rate Changes: Yes, rn stopped   Dialysis Catheter Dressing: being removed   Last Dressing Change: being removed     Electronic Signatures:  Le guzman     Last Updated: 1:37 PM by LE GUZMAN

## 2025-01-15 NOTE — CARE PLAN
The patient's goals for the shift include      The clinical goals for the shift include Safety, Monitor for bleeding    Over the shift, the patient did not make progress toward the following goals. Barriers to progression include dialysis today. Recommendations to address these barriers include dialysis.

## 2025-01-16 ENCOUNTER — APPOINTMENT (OUTPATIENT)
Dept: DIALYSIS | Facility: HOSPITAL | Age: 70
End: 2025-01-16
Payer: MEDICARE

## 2025-01-16 ENCOUNTER — APPOINTMENT (OUTPATIENT)
Dept: RADIOLOGY | Facility: HOSPITAL | Age: 70
DRG: 252 | End: 2025-01-16
Payer: MEDICARE

## 2025-01-16 LAB
ALBUMIN SERPL BCP-MCNC: 2.6 G/DL (ref 3.4–5)
ANION GAP BLDV CALCULATED.4IONS-SCNC: 3 MMOL/L (ref 10–25)
ANION GAP SERPL CALCULATED.3IONS-SCNC: 10 MMOL/L (ref 10–20)
APTT PPP: 31.7 SECONDS (ref 22–32.5)
BASE EXCESS BLDV CALC-SCNC: 4.3 MMOL/L (ref -2–3)
BASOPHILS # BLD AUTO: 0.03 X10*3/UL (ref 0–0.1)
BASOPHILS NFR BLD AUTO: 0.4 %
BODY TEMPERATURE: 37 DEGREES CELSIUS
BUN SERPL-MCNC: 12 MG/DL (ref 6–23)
CA-I BLDV-SCNC: 1.21 MMOL/L (ref 1.1–1.33)
CALCIUM SERPL-MCNC: 7.9 MG/DL (ref 8.6–10.3)
CHLORIDE BLDV-SCNC: 99 MMOL/L (ref 98–107)
CHLORIDE SERPL-SCNC: 97 MMOL/L (ref 98–107)
CO2 SERPL-SCNC: 32 MMOL/L (ref 21–32)
CREAT SERPL-MCNC: 1.86 MG/DL (ref 0.5–1.05)
EGFRCR SERPLBLD CKD-EPI 2021: 29 ML/MIN/1.73M*2
EOSINOPHIL # BLD AUTO: 0.09 X10*3/UL (ref 0–0.7)
EOSINOPHIL NFR BLD AUTO: 1.2 %
ERYTHROCYTE [DISTWIDTH] IN BLOOD BY AUTOMATED COUNT: 13.5 % (ref 11.5–14.5)
GLUCOSE BLD MANUAL STRIP-MCNC: 105 MG/DL (ref 74–99)
GLUCOSE BLD MANUAL STRIP-MCNC: 153 MG/DL (ref 74–99)
GLUCOSE BLD MANUAL STRIP-MCNC: 196 MG/DL (ref 74–99)
GLUCOSE BLD MANUAL STRIP-MCNC: 224 MG/DL (ref 74–99)
GLUCOSE BLDV-MCNC: 105 MG/DL (ref 74–99)
GLUCOSE SERPL-MCNC: 106 MG/DL (ref 74–99)
HCO3 BLDV-SCNC: 32 MMOL/L (ref 22–26)
HCT VFR BLD AUTO: 27.1 % (ref 36–46)
HCT VFR BLD EST: 25 % (ref 36–46)
HGB BLD-MCNC: 8.3 G/DL (ref 12–16)
HGB BLDV-MCNC: 8.2 G/DL (ref 12–16)
IMM GRANULOCYTES # BLD AUTO: 0.12 X10*3/UL (ref 0–0.7)
IMM GRANULOCYTES NFR BLD AUTO: 1.6 % (ref 0–0.9)
INHALED O2 CONCENTRATION: 36 %
LACTATE BLDV-SCNC: 0.5 MMOL/L (ref 0.4–2)
LYMPHOCYTES # BLD AUTO: 0.7 X10*3/UL (ref 1.2–4.8)
LYMPHOCYTES NFR BLD AUTO: 9.1 %
MAGNESIUM SERPL-MCNC: 2.41 MG/DL (ref 1.6–2.4)
MCH RBC QN AUTO: 30.7 PG (ref 26–34)
MCHC RBC AUTO-ENTMCNC: 30.6 G/DL (ref 32–36)
MCV RBC AUTO: 100 FL (ref 80–100)
MONOCYTES # BLD AUTO: 0.61 X10*3/UL (ref 0.1–1)
MONOCYTES NFR BLD AUTO: 8 %
NEUTROPHILS # BLD AUTO: 6.11 X10*3/UL (ref 1.2–7.7)
NEUTROPHILS NFR BLD AUTO: 79.7 %
NRBC BLD-RTO: 0 /100 WBCS (ref 0–0)
OXYHGB MFR BLDV: 95.7 % (ref 45–75)
PCO2 BLDV: 68 MM HG (ref 41–51)
PH BLDV: 7.28 PH (ref 7.33–7.43)
PHOSPHATE SERPL-MCNC: 4.3 MG/DL (ref 2.5–4.9)
PLATELET # BLD AUTO: 154 X10*3/UL (ref 150–450)
PO2 BLDV: 74 MM HG (ref 35–45)
POTASSIUM BLDV-SCNC: 3.8 MMOL/L (ref 3.5–5.3)
POTASSIUM SERPL-SCNC: 3.7 MMOL/L (ref 3.5–5.3)
RBC # BLD AUTO: 2.7 X10*6/UL (ref 4–5.2)
SAO2 % BLDV: 97 % (ref 45–75)
SODIUM BLDV-SCNC: 130 MMOL/L (ref 136–145)
SODIUM SERPL-SCNC: 135 MMOL/L (ref 136–145)
WBC # BLD AUTO: 7.7 X10*3/UL (ref 4.4–11.3)

## 2025-01-16 PROCEDURE — 2500000001 HC RX 250 WO HCPCS SELF ADMINISTERED DRUGS (ALT 637 FOR MEDICARE OP)

## 2025-01-16 PROCEDURE — 36415 COLL VENOUS BLD VENIPUNCTURE: CPT

## 2025-01-16 PROCEDURE — 2020000001 HC ICU ROOM DAILY

## 2025-01-16 PROCEDURE — 94640 AIRWAY INHALATION TREATMENT: CPT

## 2025-01-16 PROCEDURE — 83735 ASSAY OF MAGNESIUM: CPT

## 2025-01-16 PROCEDURE — 2500000005 HC RX 250 GENERAL PHARMACY W/O HCPCS

## 2025-01-16 PROCEDURE — 9420000001 HC RT PATIENT EDUCATION 5 MIN

## 2025-01-16 PROCEDURE — 97530 THERAPEUTIC ACTIVITIES: CPT | Mod: GO

## 2025-01-16 PROCEDURE — 2500000002 HC RX 250 W HCPCS SELF ADMINISTERED DRUGS (ALT 637 FOR MEDICARE OP, ALT 636 FOR OP/ED)

## 2025-01-16 PROCEDURE — 97535 SELF CARE MNGMENT TRAINING: CPT | Mod: GO

## 2025-01-16 PROCEDURE — 71045 X-RAY EXAM CHEST 1 VIEW: CPT | Performed by: RADIOLOGY

## 2025-01-16 PROCEDURE — 8010000001 HC DIALYSIS - HEMODIALYSIS PER DAY

## 2025-01-16 PROCEDURE — 99291 CRITICAL CARE FIRST HOUR: CPT

## 2025-01-16 PROCEDURE — 2500000004 HC RX 250 GENERAL PHARMACY W/ HCPCS (ALT 636 FOR OP/ED)

## 2025-01-16 PROCEDURE — 94667 MNPJ CHEST WALL 1ST: CPT

## 2025-01-16 PROCEDURE — 71045 X-RAY EXAM CHEST 1 VIEW: CPT

## 2025-01-16 PROCEDURE — 94660 CPAP INITIATION&MGMT: CPT

## 2025-01-16 PROCEDURE — 97110 THERAPEUTIC EXERCISES: CPT | Mod: GP

## 2025-01-16 PROCEDURE — 2500000004 HC RX 250 GENERAL PHARMACY W/ HCPCS (ALT 636 FOR OP/ED): Performed by: INTERNAL MEDICINE

## 2025-01-16 PROCEDURE — 85025 COMPLETE CBC W/AUTO DIFF WBC: CPT

## 2025-01-16 PROCEDURE — 97530 THERAPEUTIC ACTIVITIES: CPT | Mod: GP

## 2025-01-16 PROCEDURE — 82947 ASSAY GLUCOSE BLOOD QUANT: CPT

## 2025-01-16 PROCEDURE — 84132 ASSAY OF SERUM POTASSIUM: CPT

## 2025-01-16 PROCEDURE — 85730 THROMBOPLASTIN TIME PARTIAL: CPT

## 2025-01-16 RX ORDER — HEPARIN SODIUM 1000 [USP'U]/ML
2000 INJECTION, SOLUTION INTRAVENOUS; SUBCUTANEOUS
Status: DISCONTINUED | OUTPATIENT
Start: 2025-01-16 | End: 2025-01-21 | Stop reason: HOSPADM

## 2025-01-16 RX ADMIN — IPRATROPIUM BROMIDE AND ALBUTEROL SULFATE 3 ML: 2.5; .5 SOLUTION RESPIRATORY (INHALATION) at 10:43

## 2025-01-16 RX ADMIN — AMIODARONE HYDROCHLORIDE 200 MG: 200 TABLET ORAL at 08:44

## 2025-01-16 RX ADMIN — Medication 3 ML: at 08:00

## 2025-01-16 RX ADMIN — INSULIN LISPRO 1 UNITS: 100 INJECTION, SOLUTION INTRAVENOUS; SUBCUTANEOUS at 17:04

## 2025-01-16 RX ADMIN — HEPARIN SODIUM 5000 UNITS: 5000 INJECTION, SOLUTION INTRAVENOUS; SUBCUTANEOUS at 21:39

## 2025-01-16 RX ADMIN — PANTOPRAZOLE SODIUM 40 MG: 40 INJECTION, POWDER, FOR SOLUTION INTRAVENOUS at 20:21

## 2025-01-16 RX ADMIN — IPRATROPIUM BROMIDE AND ALBUTEROL SULFATE 3 ML: 2.5; .5 SOLUTION RESPIRATORY (INHALATION) at 08:00

## 2025-01-16 RX ADMIN — HEPARIN SODIUM 5000 UNITS: 5000 INJECTION, SOLUTION INTRAVENOUS; SUBCUTANEOUS at 15:19

## 2025-01-16 RX ADMIN — Medication 3 L/MIN: at 20:43

## 2025-01-16 RX ADMIN — INSULIN LISPRO 1 UNITS: 100 INJECTION, SOLUTION INTRAVENOUS; SUBCUTANEOUS at 12:21

## 2025-01-16 RX ADMIN — ASPIRIN 81 MG: 81 TABLET, COATED ORAL at 08:44

## 2025-01-16 RX ADMIN — IPRATROPIUM BROMIDE AND ALBUTEROL SULFATE 3 ML: 2.5; .5 SOLUTION RESPIRATORY (INHALATION) at 15:48

## 2025-01-16 RX ADMIN — METOPROLOL SUCCINATE 50 MG: 50 TABLET, EXTENDED RELEASE ORAL at 08:44

## 2025-01-16 RX ADMIN — ATORVASTATIN CALCIUM 40 MG: 40 TABLET, FILM COATED ORAL at 20:21

## 2025-01-16 RX ADMIN — AMLODIPINE BESYLATE 5 MG: 5 TABLET ORAL at 08:45

## 2025-01-16 RX ADMIN — HEPARIN SODIUM 2000 UNITS: 1000 INJECTION, SOLUTION INTRAVENOUS; SUBCUTANEOUS at 16:12

## 2025-01-16 RX ADMIN — Medication 3 ML: at 15:48

## 2025-01-16 RX ADMIN — Medication 3 ML: at 20:43

## 2025-01-16 RX ADMIN — HEPARIN SODIUM 2000 UNITS: 1000 INJECTION, SOLUTION INTRAVENOUS; SUBCUTANEOUS at 16:11

## 2025-01-16 RX ADMIN — PANTOPRAZOLE SODIUM 40 MG: 40 INJECTION, POWDER, FOR SOLUTION INTRAVENOUS at 08:44

## 2025-01-16 RX ADMIN — Medication 3 ML: at 10:43

## 2025-01-16 RX ADMIN — HEPARIN SODIUM 5000 UNITS: 5000 INJECTION, SOLUTION INTRAVENOUS; SUBCUTANEOUS at 06:19

## 2025-01-16 RX ADMIN — IPRATROPIUM BROMIDE AND ALBUTEROL SULFATE 3 ML: 2.5; .5 SOLUTION RESPIRATORY (INHALATION) at 20:43

## 2025-01-16 RX ADMIN — Medication 30 PERCENT: at 08:06

## 2025-01-16 RX ADMIN — VENLAFAXINE HYDROCHLORIDE 150 MG: 150 CAPSULE, EXTENDED RELEASE ORAL at 08:44

## 2025-01-16 ASSESSMENT — PAIN SCALES - GENERAL
PAINLEVEL_OUTOF10: 0 - NO PAIN

## 2025-01-16 ASSESSMENT — ACTIVITIES OF DAILY LIVING (ADL): HOME_MANAGEMENT_TIME_ENTRY: 15

## 2025-01-16 ASSESSMENT — COGNITIVE AND FUNCTIONAL STATUS - GENERAL
STANDING UP FROM CHAIR USING ARMS: A LOT
MOBILITY SCORE: 10
EATING MEALS: A LITTLE
DRESSING REGULAR LOWER BODY CLOTHING: A LOT
DRESSING REGULAR UPPER BODY CLOTHING: A LOT
TURNING FROM BACK TO SIDE WHILE IN FLAT BAD: A LOT
MOVING TO AND FROM BED TO CHAIR: A LOT
TOILETING: A LOT
HELP NEEDED FOR BATHING: A LOT
MOVING FROM LYING ON BACK TO SITTING ON SIDE OF FLAT BED WITH BEDRAILS: A LOT
WALKING IN HOSPITAL ROOM: TOTAL
DAILY ACTIVITIY SCORE: 13
PERSONAL GROOMING: A LOT
CLIMB 3 TO 5 STEPS WITH RAILING: TOTAL

## 2025-01-16 ASSESSMENT — PAIN - FUNCTIONAL ASSESSMENT
PAIN_FUNCTIONAL_ASSESSMENT: UNABLE TO ASSESS
PAIN_FUNCTIONAL_ASSESSMENT: 0-10

## 2025-01-16 NOTE — CARE PLAN
The patient's goals for the shift include      The clinical goals for the shift include remain hemodynamically stable      Problem: Skin  Goal: Decreased wound size/increased tissue granulation at next dressing change  Outcome: Progressing  Flowsheets (Taken 1/16/2025 1006)  Decreased wound size/increased tissue granulation at next dressing change:   Promote sleep for wound healing   Utilize specialty bed per algorithm  Goal: Participates in plan/prevention/treatment measures  Outcome: Progressing  Flowsheets (Taken 1/16/2025 1006)  Participates in plan/prevention/treatment measures:   Discuss with provider PT/OT consult   Elevate heels  Goal: Prevent/manage excess moisture  Outcome: Progressing  Flowsheets (Taken 1/16/2025 1006)  Prevent/manage excess moisture:   Cleanse incontinence/protect with barrier cream   Follow provider orders for dressing changes  Goal: Prevent/minimize sheer/friction injuries  Outcome: Progressing  Flowsheets (Taken 1/16/2025 1006)  Prevent/minimize sheer/friction injuries:   Increase activity/out of bed for meals   Complete micro-shifts as needed if patient unable. Adjust patient position to relieve pressure points, not a full turn  Goal: Promote/optimize nutrition  Outcome: Progressing  Flowsheets (Taken 1/16/2025 1006)  Promote/optimize nutrition:   Assist with feeding   Monitor/record intake including meals   Consume > 50% meals/supplements  Goal: Promote skin healing  Outcome: Progressing  Flowsheets (Taken 1/16/2025 1006)  Promote skin healing:   Assess skin/pad under line(s)/device(s)   Protective dressings over bony prominences   Turn/reposition every 2 hours/use positioning/transfer devices   Ensure correct size (line/device) and apply per  instructions     Problem: Fall/Injury  Goal: Not fall by end of shift  Outcome: Progressing  Goal: Be free from injury by end of the shift  Outcome: Progressing  Goal: Verbalize understanding of personal risk factors for fall in  the hospital  Outcome: Progressing  Goal: Verbalize understanding of risk factor reduction measures to prevent injury from fall in the home  Outcome: Progressing  Goal: Use assistive devices by end of the shift  Outcome: Progressing  Goal: Pace activities to prevent fatigue by end of the shift  Outcome: Progressing     Problem: Nutrition  Goal: Oral intake greater 75%  Outcome: Progressing  Goal: Consume prescribed supplement  Outcome: Progressing  Goal: Promote healing  Outcome: Progressing     Problem: Dialysis  Goal: I will slow progression of end-stage renal disease through participating in dialysis 3 times a week  Outcome: Progressing     Problem: Respiratory  Goal: Minimal/no exertional discomfort or dyspnea this shift  Outcome: Progressing  Goal: No signs of respiratory distress (eg. Use of accessory muscles. Peds grunting)  Outcome: Progressing  Goal: Patent airway maintained this shift  Outcome: Progressing  Goal: Verbalize decreased shortness of breath this shift  Outcome: Progressing  Goal: Wean oxygen to maintain O2 saturation per order/standard this shift  Outcome: Progressing     Problem: Bathing  Goal: STG - Patient will bathe upper body with minimal assist  Outcome: Progressing     Problem: Dressing Upper Extremities  Goal: LTG - Patient will complete upper body dressing with set up  Outcome: Progressing     Problem: Grooming  Goal: STG - Patient completes grooming with set up  Outcome: Progressing     Problem: Toileting  Goal: STG - Patient will complete toileting tasks with 2ww and minimal assist  Outcome: Progressing     Problem: Pain - Adult  Goal: Verbalizes/displays adequate comfort level or baseline comfort level  Outcome: Progressing     Problem: Safety - Adult  Goal: Free from fall injury  Outcome: Progressing     Problem: Discharge Planning  Goal: Discharge to home or other facility with appropriate resources  Outcome: Progressing     Problem: Chronic Conditions and Co-morbidities  Goal:  Patient's chronic conditions and co-morbidity symptoms are monitored and maintained or improved  Outcome: Progressing     Problem: Diabetes  Goal: Achieve decreasing blood glucose levels by end of shift  Outcome: Progressing  Goal: Increase stability of blood glucose readings by end of shift  Outcome: Progressing  Goal: Decrease in ketones present in urine by end of shift  Outcome: Progressing  Goal: Maintain electrolyte levels within acceptable range throughout shift  Outcome: Progressing  Goal: Maintain glucose levels >70mg/dl to <250mg/dl throughout shift  Outcome: Progressing  Goal: No changes in neurological exam by end of shift  Outcome: Progressing  Goal: Learn about and adhere to nutrition recommendations by end of shift  Outcome: Progressing  Goal: Vital signs within normal range for age by end of shift  Outcome: Progressing  Goal: Increase self care and/or family involovement by end of shift  Outcome: Progressing  Goal: Receive DSME education by end of shift  Outcome: Progressing

## 2025-01-16 NOTE — CARE PLAN
The patient's goals for the shift include  breath better and rest    The clinical goals for the shift include hemodynamic stability, decrease O2 demand      Problem: Skin  Goal: Decreased wound size/increased tissue granulation at next dressing change  1/15/2025 2315 by Kayla Felipe RN  Outcome: Progressing  Flowsheets (Taken 1/15/2025 2315)  Decreased wound size/increased tissue granulation at next dressing change:   Promote sleep for wound healing   Protective dressings over bony prominences  1/15/2025 2313 by Kayla Felipe RN  Flowsheets (Taken 1/15/2025 2313)  Decreased wound size/increased tissue granulation at next dressing change:   Promote sleep for wound healing   Protective dressings over bony prominences  Goal: Participates in plan/prevention/treatment measures  1/15/2025 2315 by Kayla Felipe RN  Outcome: Progressing  Flowsheets (Taken 1/15/2025 2315)  Participates in plan/prevention/treatment measures:   Discuss with provider PT/OT consult   Elevate heels   Increase activity/out of bed for meals  1/15/2025 2313 by Kayla Felipe RN  Flowsheets (Taken 1/15/2025 2313)  Participates in plan/prevention/treatment measures:   Discuss with provider PT/OT consult   Elevate heels   Increase activity/out of bed for meals  Goal: Prevent/manage excess moisture  1/15/2025 2315 by Kayla Felipe RN  Outcome: Progressing  Flowsheets (Taken 1/15/2025 2315)  Prevent/manage excess moisture:   Moisturize dry skin   Cleanse incontinence/protect with barrier cream   Follow provider orders for dressing changes  1/15/2025 2313 by Kayla Felipe RN  Flowsheets (Taken 1/15/2025 2313)  Prevent/manage excess moisture:   Moisturize dry skin   Cleanse incontinence/protect with barrier cream   Monitor for/manage infection if present   Follow provider orders for dressing changes  Goal: Prevent/minimize sheer/friction injuries  1/15/2025 2315 by Kayla Felipe RN  Outcome: Progressing  Flowsheets (Taken 1/15/2025  2315)  Prevent/minimize sheer/friction injuries:   Complete micro-shifts as needed if patient unable. Adjust patient position to relieve pressure points, not a full turn   Increase activity/out of bed for meals   Turn/reposition every 2 hours/use positioning/transfer devices   Use pull sheet  1/15/2025 2313 by Kayla Felipe RN  Flowsheets (Taken 1/15/2025 2313)  Prevent/minimize sheer/friction injuries:   Complete micro-shifts as needed if patient unable. Adjust patient position to relieve pressure points, not a full turn   Turn/reposition every 2 hours/use positioning/transfer devices   Use pull sheet   Increase activity/out of bed for meals  Goal: Promote/optimize nutrition  1/15/2025 2315 by Kayla Felipe RN  Outcome: Progressing  Flowsheets (Taken 1/15/2025 2315)  Promote/optimize nutrition:   Consume > 50% meals/supplements   Monitor/record intake including meals  1/15/2025 2313 by Kayla Felipe RN  Flowsheets (Taken 1/15/2025 2313)  Promote/optimize nutrition:   Monitor/record intake including meals   Consume > 50% meals/supplements  Goal: Promote skin healing  1/15/2025 2315 by Kayla Felipe RN  Outcome: Progressing  Flowsheets (Taken 1/15/2025 2315)  Promote skin healing:   Assess skin/pad under line(s)/device(s)   Ensure correct size (line/device) and apply per  instructions   Protective dressings over bony prominences   Rotate device position/do not position patient on device   Turn/reposition every 2 hours/use positioning/transfer devices  1/15/2025 2313 by Kayla Felipe RN  Flowsheets (Taken 1/15/2025 2313)  Promote skin healing:   Assess skin/pad under line(s)/device(s)   Protective dressings over bony prominences   Rotate device position/do not position patient on device   Turn/reposition every 2 hours/use positioning/transfer devices   Ensure correct size (line/device) and apply per  instructions     Problem: Fall/Injury  Goal: Not fall by end of shift  Outcome:  Progressing  Goal: Be free from injury by end of the shift  Outcome: Progressing  Goal: Verbalize understanding of personal risk factors for fall in the hospital  Outcome: Progressing  Goal: Verbalize understanding of risk factor reduction measures to prevent injury from fall in the home  Outcome: Progressing  Goal: Use assistive devices by end of the shift  Outcome: Progressing  Goal: Pace activities to prevent fatigue by end of the shift  Outcome: Progressing     Problem: Nutrition  Goal: Oral intake greater 75%  Outcome: Progressing  Goal: Consume prescribed supplement  Outcome: Progressing  Goal: Promote healing  Outcome: Progressing     Problem: Dialysis  Goal: I will slow progression of end-stage renal disease through participating in dialysis 3 times a week  Outcome: Progressing     Problem: Respiratory  Goal: Minimal/no exertional discomfort or dyspnea this shift  Outcome: Progressing  Goal: No signs of respiratory distress (eg. Use of accessory muscles. Peds grunting)  Outcome: Progressing  Goal: Patent airway maintained this shift  Outcome: Progressing  Goal: Verbalize decreased shortness of breath this shift  Outcome: Progressing  Goal: Wean oxygen to maintain O2 saturation per order/standard this shift  Outcome: Progressing     Problem: Bathing  Goal: STG - Patient will bathe upper body with minimal assist  Outcome: Progressing     Problem: Dressing Upper Extremities  Goal: LTG - Patient will complete upper body dressing with set up  Outcome: Progressing     Problem: Grooming  Goal: STG - Patient completes grooming with set up  Outcome: Progressing     Problem: Toileting  Goal: STG - Patient will complete toileting tasks with 2ww and minimal assist  Outcome: Progressing     Problem: Pain - Adult  Goal: Verbalizes/displays adequate comfort level or baseline comfort level  Outcome: Progressing     Problem: Safety - Adult  Goal: Free from fall injury  Outcome: Progressing     Problem: Discharge  Planning  Goal: Discharge to home or other facility with appropriate resources  Outcome: Progressing     Problem: Chronic Conditions and Co-morbidities  Goal: Patient's chronic conditions and co-morbidity symptoms are monitored and maintained or improved  Outcome: Progressing     Problem: Diabetes  Goal: Achieve decreasing blood glucose levels by end of shift  Outcome: Progressing  Goal: Increase stability of blood glucose readings by end of shift  Outcome: Progressing  Goal: Decrease in ketones present in urine by end of shift  Outcome: Progressing  Goal: Maintain electrolyte levels within acceptable range throughout shift  Outcome: Progressing  Goal: Maintain glucose levels >70mg/dl to <250mg/dl throughout shift  Outcome: Progressing  Goal: No changes in neurological exam by end of shift  Outcome: Progressing  Goal: Learn about and adhere to nutrition recommendations by end of shift  Outcome: Progressing  Goal: Vital signs within normal range for age by end of shift  Outcome: Progressing  Goal: Increase self care and/or family involovement by end of shift  Outcome: Progressing  Goal: Receive DSME education by end of shift  Outcome: Progressing

## 2025-01-16 NOTE — PROGRESS NOTES
Physical Therapy    Physical Therapy Treatment    Patient Name: Daphne Morris  MRN: [id number]  Department: [facility] DIALYSIS  Room: 13/13-A  Today's Date: 1/16/2025  Time Calculation  Start Time: 1017  Stop Time: 1043  Time Calculation (min): 26 min    Assessment/Plan   PT Assessment  Barriers to Discharge Home: Caregiver assistance, Physical needs  Caregiver Assistance: Patient lives alone and/or does not have reliable caregiver assistance, Caregiver assistance needed per identified barriers - however, level of patient's required assistance exceeds assistance available at home  Physical Needs: Stair navigation into home limited by function/safety, Ambulating household distances limited by function/safety  End of Session Communication: Bedside nurse  Assessment Comment: pt with increased weakness requiring mod A x 2 for transfers and mobiltiy advancement. pt fatigues quickly and is a high risk for falls. pt will benefit from contineud skilled therpay services to improve her functional performance and maximize safety.  End of Session Patient Position: Up in chair, Alarm on (needs in reach)  PT Plan  Inpatient/Swing Bed or Outpatient: Inpatient  PT Plan  Treatment/Interventions: Bed mobility, Transfer training, Gait training, Balance training, Neuromuscular re-education, Strengthening, Endurance training, Range of motion, Therapeutic exercise, Therapeutic activity, Positioning, Postural re-education  PT Plan: Ongoing PT  PT Frequency: 4 times per week  PT Discharge Recommendations: Moderate intensity level of continued care  Equipment Recommended upon Discharge: Wheeled walker  PT Recommended Transfer Status: Assist x2  PT - OK to Discharge: Yes    General Visit Information:   PT  Visit  PT Received On: 01/16/25  Response to Previous Treatment: Patient reporting fatigue but able to participate.  General  Family/Caregiver Present: No  Co-Treatment: OT  Co-Treatment Reason: patient/caregiver safety and optimization of  patient outcomes for a medically complex treatment in ICU  Prior to Session Communication: Bedside nurse  Patient Position Received: Bed, 3 rail up, Alarm on  Preferred Learning Style: verbal, visual  General Comment: pt transfered to ICU the day prior due to decreased responsiveness and respiratory distress following IR procedure.    Subjective   Precautions:  Precautions  Hearing/Visual Limitations: [place] wears bilateral hearing aides, wears glasses  Medical Precautions: Fall precautions, Oxygen therapy device and L/min (3L O2 via NC)    Vital Signs Comment: HR 99 bpm, 97%, 21 breaths/min, 116/65 (80), post therapy BP 97/64 (73) mmHg     Objective   Pain:  Pain Assessment  Pain Assessment: 0-10  0-10 (Numeric) Pain Score: 0 - No pain  Cognition:  Cognition  Overall Cognitive Status: Impaired  Orientation Level: Oriented X4  Following Commands: Follows one step commands with increased time  Cognition Comments: delay in resonse to questions, significantly increased time to complete activities  Coordination:  Coordination Comment: poor movement quality, increased time to complete tasks  Postural Control:  Postural Control  Posture Comment: forward flexed at hips/trunk, rounded shoulders  Static Sitting Balance  Static Sitting-Comment/Number of Minutes: sat EOB x 4 minutes, mild dizziness with initial transfer, CGA for balance and safety  Static Standing Balance  Static Standing-Comment/Number of Minutes: stood in place <1 minute with mod A for balance    Activity Tolerance:  Activity Tolerance  Endurance: Decreased tolerance for upright activites  Activity Tolerance Comments: fair-  Rate of Perceived Exertion (RPE): 5  Treatments:  Therapeutic Exercise  Therapeutic Exercise Performed: Yes  Therapeutic Exercise Activity 1: supine B APs x 12 reps  Therapeutic Exercise Activity 2: supine B heel slides x 5-10 reps    Bed Mobility  Bed Mobility: Yes  Bed Mobility 1  Bed Mobility 1: Supine to sitting  Level of Assistance 1:  Moderate assistance, +2  Bed Mobility Comments 1: pt able to incrementally move B LEs towards the EOB, min A to complete movement, mod A to elevate trunk and square hips at EOB using the draw sheet.    Transfers  Transfer: Yes  Transfer 1  Transfer From 1: Bed to  Transfer to 1: Chair with arms  Technique 1: Sit to stand, Stand to sit  Transfer Level of Assistance 1: Moderate assistance, +2  Trials/Comments 1: mod A x 2 to guide trunk up to stand, unable to stand fully erect, COG posterior to BEATRICE. pt sliding feet along the floor few inches at a time, unable to lift feet to take steps, mod A x 2 for balance and safety. decreased eccentric control when sitting in chair.    Outcome Measures:  [facility] Basic Mobility  Turning from your back to your side while in a flat bed without using bedrails: A lot  Moving from lying on your back to sitting on the side of a flat bed without using bedrails: A lot  Moving to and from bed to chair (including a wheelchair): A lot  Standing up from a chair using your arms (e.g. wheelchair or bedside chair): A lot  To walk in hospital room: Total  Climbing 3-5 steps with railing: Total  Basic Mobility - Total Score: 10    FSS-ICU  Ambulation: Walks <50 feet with any assistance x1 or walks any distance with assistance x2 people  Rolling: Maximal assistance (performs 25% - 49% of task)  Sitting: Minimal assistance (performs 75% or more of task)  Transfer Sit-to-Stand: Maximal assistance (performs 25% - 49% of task)  Transfer Supine-to-Sit: Maximal assistance (performs 25% - 49% of task)  Total Score: 11    Education Documentation  Body Mechanics, taught by Jania Roy PT at 1/16/2025  2:21 PM.  Learner: Patient  Readiness: Acceptance  Method: Explanation  Response: Verbalizes Understanding    Mobility Training, taught by Jania Roy PT at 1/16/2025  2:21 PM.  Learner: Patient  Readiness: Acceptance  Method: Explanation  Response: Verbalizes Understanding    Education  Comments  No comments found.        OP EDUCATION:       Encounter Problems       Encounter Problems (Active)       PT Problem       Patient will ambulate 50' distance using walker with stand by assist (Progressing)       Start:  01/11/25    Expected End:  01/25/25            Patient will perform chair to and from bed transfer with stand by assist (Progressing)       Start:  01/11/25    Expected End:  01/25/25            Patient will perform supine to sit on bed with stand by assist demonstrating control (Progressing)       Start:  01/11/25    Expected End:  01/25/25               Pain - Adult

## 2025-01-16 NOTE — PROGRESS NOTES
CONSULT PROGRESS NOTES    SERVICE DATE: 1/16/2025   SERVICE TIME: 12:58 PM    CONSULTING SERVICE: Nephrology    ASSESSMENT AND PLAN   69-year-old woman in on dialysis admitted with clotted arteriovenous graft, now alteration in mental status.  1.  End-stage renal disease  2.  Malfunctioning AVG  3.  Edema  4.  Fluid overload  5.  Essential hypertension  6.  Anemia of chronic kidney disease     Status post stent placement, now the AVG does not seem to be working well on dialysis, though I hear a soft bruit today.  Status post placement of tunneled hemodialysis catheter.  I have requested vascular surgery to weigh in on the case as well, she will need more definitive arteriovenous access in the outpatient setting.    There is no need for anticoagulation for clotted arteriovenous access at this point.  Seems to have pulmonary edema, unclear why, poor oral intake and has had decent fluid removal during this hospitalization.  Low threshold to back off on the fluid removal if her blood pressure remains low.  2-hour UF only run today and attempt 2 L volume removal.  Erythropoietin stimulating agents to be given with dialysis.  There is no need for daily routine chemistry in this dialysis patient.  I will follow her.  Case discussed with the ICU team.  Hopefully she can transfer back to the medical floor after treatment on [facility] dialysis today.    SUBJECTIVE  INTERVAL HPI: She was transferred to the ICU for worsening respiratory failure.  A chest x-ray demonstrates worsening pulmonary edema.  Yesterday she underwent placement of a tunneled hemodialysis catheter with removal of her acute catheter given the poorly functioning arteriovenous graft on dialysis yesterday.  She did tolerate dialysis yesterday with 2.8 L volume removal.    MEDICATIONS:  amiodarone, 200 mg, oral, Daily with breakfast  [Held by provider] amLODIPine, 5 mg, oral, Daily  aspirin, 81 mg, oral, Daily  atorvastatin, 40 mg, oral, Nightly  [Held by provider]  clopidogrel, 75 mg, oral, Daily  epoetin debra or biosimilar, 5,000 Units, subcutaneous, Once per day on Monday Wednesday Friday  heparin (porcine), 5,000 Units, subcutaneous, q8h SUMIT  heparin, 2,000 Units, intra-catheter, After Dialysis  heparin, 2,000 Units, intra-catheter, After Dialysis  heparin, 2,000 Units, intra-catheter, After Dialysis  heparin, 2,000 Units, intra-catheter, After Dialysis  insulin lispro, 0-5 Units, subcutaneous, TID AC  ipratropium-albuteroL, 3 mL, nebulization, 4x daily  levothyroxine, 200 mcg, oral, Daily  levothyroxine, 50 mcg, oral, Daily  [Held by provider] metoprolol succinate XL, 50 mg, oral, Daily  oxygen, , inhalation, Continuous - Inhalation  pantoprazole, 40 mg, intravenous, BID  sodium chloride, 3 mL, nebulization, 4x daily  venlafaxine XR, 150 mg, oral, Daily             PRN medications: acetaminophen **OR** acetaminophen **OR** acetaminophen, albuterol, benzocaine-menthol, bisacodyl, dextromethorphan-guaifenesin, dextrose, dextrose, glucagon, glucagon, guaiFENesin, melatonin, oxygen, polyethylene glycol, prochlorperazine **OR** prochlorperazine **OR** prochlorperazine     OBJECTIVE  PHYSICAL EXAM:   Heart Rate:  []   Temp:  [36.5 °C (97.7 °F)-36.9 °C (98.4 °F)]   Resp:  [15-24]   BP: ()/(58-78)   Weight:  [65.1 kg (143 lb 8.3 oz)]   SpO2:  [92 %-100 %]   Body mass index is 23.16 kg/m².  This is a chronically ill-appearing woman, seems much older than her known age  Very pale skin  Hearing intact  Phonation intact  Moist mucosa  Normal S1/normal S2  Lungs are clear anteriorly, increased work of breathing  Abdomen is soft, nondistended, nontender, positive bowel sounds  No Fay catheter in place, no suprapubic tenderness to palpation  Trace bilateral lower extremity edema  She has a right upper extremity graft and has an extensive bandage over it with significant blood on the bandage.  There is a faint bruit, but there is no thrill  Moves 4 limbs spontaneously  No  obvious joint deformities  No lymphadenopathy  Right internal jugular tunneled hemodialysis catheter in place    DATA:   Labs:  Results for orders placed or performed during the hospital encounter of 01/10/25 (from the past 96 hours)   POCT GLUCOSE   Result Value Ref Range    POCT Glucose 93 74 - 99 mg/dL   POCT GLUCOSE   Result Value Ref Range    POCT Glucose 125 (H) 74 - 99 mg/dL   Calcium, Ionized   Result Value Ref Range    POCT Calcium, Ionized 1.08 (L) 1.1 - 1.33 mmol/L   Magnesium   Result Value Ref Range    Magnesium 1.87 1.60 - 2.40 mg/dL   Renal function panel   Result Value Ref Range    Glucose 83 74 - 99 mg/dL    Sodium 132 (L) 136 - 145 mmol/L    Potassium 3.7 3.5 - 5.3 mmol/L    Chloride 96 (L) 98 - 107 mmol/L    Bicarbonate 30 21 - 32 mmol/L    Anion Gap 10 10 - 20 mmol/L    Urea Nitrogen 38 (H) 6 - 23 mg/dL    Creatinine 3.47 (H) 0.50 - 1.05 mg/dL    eGFR 14 (L) >60 mL/min/1.73m*2    Calcium 7.6 (L) 8.6 - 10.3 mg/dL    Phosphorus 3.0 2.5 - 4.9 mg/dL    Albumin 2.6 (L) 3.4 - 5.0 g/dL   APTT   Result Value Ref Range    aPTT 43.5 (H) 22.0 - 32.5 seconds   POCT GLUCOSE   Result Value Ref Range    POCT Glucose 92 74 - 99 mg/dL   POCT GLUCOSE   Result Value Ref Range    POCT Glucose 148 (H) 74 - 99 mg/dL   aPTT   Result Value Ref Range    aPTT 34.2 (H) 22.0 - 32.5 seconds   POCT GLUCOSE   Result Value Ref Range    POCT Glucose 94 74 - 99 mg/dL   aPTT   Result Value Ref Range    aPTT 64.1 (H) 22.0 - 32.5 seconds   Lavender Top   Result Value Ref Range    Extra Tube Hold for add-ons.    PST Top   Result Value Ref Range    Extra Tube Hold for add-ons.    Basic metabolic panel   Result Value Ref Range    Glucose 54 (LL) 74 - 99 mg/dL    Sodium 136 136 - 145 mmol/L    Potassium 3.9 3.5 - 5.3 mmol/L    Chloride 100 98 - 107 mmol/L    Bicarbonate 32 21 - 32 mmol/L    Anion Gap 8 (L) 10 - 20 mmol/L    Urea Nitrogen 19 6 - 23 mg/dL    Creatinine 2.39 (H) 0.50 - 1.05 mg/dL    eGFR 21 (L) >60 mL/min/1.73m*2     Calcium 7.8 (L) 8.6 - 10.3 mg/dL   CBC and Auto Differential   Result Value Ref Range    WBC 10.7 4.4 - 11.3 x10*3/uL    nRBC 0.0 0.0 - 0.0 /100 WBCs    RBC 2.74 (L) 4.00 - 5.20 x10*6/uL    Hemoglobin 8.6 (L) 12.0 - 16.0 g/dL    Hematocrit 27.4 (L) 36.0 - 46.0 %     80 - 100 fL    MCH 31.4 26.0 - 34.0 pg    MCHC 31.4 (L) 32.0 - 36.0 g/dL    RDW 13.6 11.5 - 14.5 %    Platelets 157 150 - 450 x10*3/uL    Neutrophils % 86.6 40.0 - 80.0 %    Immature Granulocytes %, Automated 0.6 0.0 - 0.9 %    Lymphocytes % 6.3 13.0 - 44.0 %    Monocytes % 6.0 2.0 - 10.0 %    Eosinophils % 0.3 0.0 - 6.0 %    Basophils % 0.2 0.0 - 2.0 %    Neutrophils Absolute 9.28 (H) 1.20 - 7.70 x10*3/uL    Immature Granulocytes Absolute, Automated 0.06 0.00 - 0.70 x10*3/uL    Lymphocytes Absolute 0.68 (L) 1.20 - 4.80 x10*3/uL    Monocytes Absolute 0.64 0.10 - 1.00 x10*3/uL    Eosinophils Absolute 0.03 0.00 - 0.70 x10*3/uL    Basophils Absolute 0.02 0.00 - 0.10 x10*3/uL   POCT GLUCOSE   Result Value Ref Range    POCT Glucose 60 (L) 74 - 99 mg/dL   POCT GLUCOSE   Result Value Ref Range    POCT Glucose 69 (L) 74 - 99 mg/dL   POCT GLUCOSE   Result Value Ref Range    POCT Glucose 107 (H) 74 - 99 mg/dL   aPTT   Result Value Ref Range    aPTT 55.3 (H) 22.0 - 32.5 seconds   POCT GLUCOSE   Result Value Ref Range    POCT Glucose 127 (H) 74 - 99 mg/dL   POCT GLUCOSE   Result Value Ref Range    POCT Glucose 302 (H) 74 - 99 mg/dL   aPTT   Result Value Ref Range    aPTT 48.1 (H) 22.0 - 32.5 seconds   POCT GLUCOSE   Result Value Ref Range    POCT Glucose 199 (H) 74 - 99 mg/dL   CBC and Auto Differential   Result Value Ref Range    WBC 8.7 4.4 - 11.3 x10*3/uL    nRBC 0.0 0.0 - 0.0 /100 WBCs    RBC 2.45 (L) 4.00 - 5.20 x10*6/uL    Hemoglobin 7.6 (L) 12.0 - 16.0 g/dL    Hematocrit 24.5 (L) 36.0 - 46.0 %     80 - 100 fL    MCH 31.0 26.0 - 34.0 pg    MCHC 31.0 (L) 32.0 - 36.0 g/dL    RDW 13.5 11.5 - 14.5 %    Platelets 146 (L) 150 - 450 x10*3/uL     Neutrophils % 80.5 40.0 - 80.0 %    Immature Granulocytes %, Automated 2.0 (H) 0.0 - 0.9 %    Lymphocytes % 9.1 13.0 - 44.0 %    Monocytes % 7.5 2.0 - 10.0 %    Eosinophils % 0.7 0.0 - 6.0 %    Basophils % 0.2 0.0 - 2.0 %    Neutrophils Absolute 6.97 1.20 - 7.70 x10*3/uL    Immature Granulocytes Absolute, Automated 0.17 0.00 - 0.70 x10*3/uL    Lymphocytes Absolute 0.79 (L) 1.20 - 4.80 x10*3/uL    Monocytes Absolute 0.65 0.10 - 1.00 x10*3/uL    Eosinophils Absolute 0.06 0.00 - 0.70 x10*3/uL    Basophils Absolute 0.02 0.00 - 0.10 x10*3/uL   aPTT   Result Value Ref Range    aPTT 41.4 (H) 22.0 - 32.5 seconds   POCT GLUCOSE   Result Value Ref Range    POCT Glucose 140 (H) 74 - 99 mg/dL   POCT GLUCOSE   Result Value Ref Range    POCT Glucose 116 (H) 74 - 99 mg/dL   POCT GLUCOSE   Result Value Ref Range    POCT Glucose 112 (H) 74 - 99 mg/dL   POCT GLUCOSE   Result Value Ref Range    POCT Glucose 105 (H) 74 - 99 mg/dL   Blood Gas Arterial Full Panel   Result Value Ref Range    POCT pH, Arterial 7.27 (L) 7.38 - 7.42 pH    POCT pCO2, Arterial 74 (HH) 38 - 42 mm Hg    POCT pO2, Arterial 71 (L) 85 - 95 mm Hg    POCT SO2, Arterial 96 94 - 100 %    POCT Oxy Hemoglobin, Arterial 94.6 94.0 - 98.0 %    POCT Hematocrit Calculated, Arterial 26.0 (L) 36.0 - 46.0 %    POCT Sodium, Arterial 132 (L) 136 - 145 mmol/L    POCT Potassium, Arterial 3.4 (L) 3.5 - 5.3 mmol/L    POCT Chloride, Arterial 99 98 - 107 mmol/L    POCT Ionized Calcium, Arterial 1.19 1.10 - 1.33 mmol/L    POCT Glucose, Arterial 120 (H) 74 - 99 mg/dL    POCT Lactate, Arterial 0.6 0.4 - 2.0 mmol/L    POCT Base Excess, Arterial 5.8 (H) -2.0 - 3.0 mmol/L    POCT HCO3 Calculated, Arterial 34.0 (H) 22.0 - 26.0 mmol/L    POCT Hemoglobin, Arterial 8.6 (L) 12.0 - 16.0 g/dL    POCT Anion Gap, Arterial 2 (L) 10 - 25 mmo/L    Patient Temperature 37.0 degrees Celsius    FiO2 36 %    Apparatus CANNULA     Flow 4.0 LPM    Critical Called By BLANCA BARNETT     Critical Called To   DENNIS     Critical Call Time 1735     Critical Read Back Y     Critical Note PCO2 74     Site of Arterial Puncture Radial Right     Jonathan's Test Positive    Blood Gas Arterial Full Panel   Result Value Ref Range    POCT pH, Arterial 7.38 7.38 - 7.42 pH    POCT pCO2, Arterial 58 (H) 38 - 42 mm Hg    POCT pO2, Arterial 74 (L) 85 - 95 mm Hg    POCT SO2, Arterial 98 94 - 100 %    POCT Oxy Hemoglobin, Arterial 95.9 94.0 - 98.0 %    POCT Hematocrit Calculated, Arterial 25.0 (L) 36.0 - 46.0 %    POCT Sodium, Arterial 132 (L) 136 - 145 mmol/L    POCT Potassium, Arterial 3.6 3.5 - 5.3 mmol/L    POCT Chloride, Arterial 99 98 - 107 mmol/L    POCT Ionized Calcium, Arterial 1.18 1.10 - 1.33 mmol/L    POCT Glucose, Arterial 110 (H) 74 - 99 mg/dL    POCT Lactate, Arterial 0.5 0.4 - 2.0 mmol/L    POCT Base Excess, Arterial 8.0 (H) -2.0 - 3.0 mmol/L    POCT HCO3 Calculated, Arterial 34.3 (H) 22.0 - 26.0 mmol/L    POCT Hemoglobin, Arterial 8.4 (L) 12.0 - 16.0 g/dL    POCT Anion Gap, Arterial 2 (L) 10 - 25 mmo/L    Patient Temperature 37.0 degrees Celsius    FiO2 30 %    Apparatus FACE MASK     Ventilator Mode BiPAP     Ventilator Rate 16 bpm    Ipap CMH2O 20.0 cm H2O    Epap CMH2O 8.0 cm H2O    Site of Arterial Puncture Radial Left     Jonathan's Test Positive    CBC and Auto Differential   Result Value Ref Range    WBC 8.3 4.4 - 11.3 x10*3/uL    nRBC 0.2 (H) 0.0 - 0.0 /100 WBCs    RBC 2.81 (L) 4.00 - 5.20 x10*6/uL    Hemoglobin 8.7 (L) 12.0 - 16.0 g/dL    Hematocrit 28.3 (L) 36.0 - 46.0 %     (H) 80 - 100 fL    MCH 31.0 26.0 - 34.0 pg    MCHC 30.7 (L) 32.0 - 36.0 g/dL    RDW 13.6 11.5 - 14.5 %    Platelets 159 150 - 450 x10*3/uL    Neutrophils % 85.4 40.0 - 80.0 %    Immature Granulocytes %, Automated 1.0 (H) 0.0 - 0.9 %    Lymphocytes % 7.1 13.0 - 44.0 %    Monocytes % 5.5 2.0 - 10.0 %    Eosinophils % 0.5 0.0 - 6.0 %    Basophils % 0.5 0.0 - 2.0 %    Neutrophils Absolute 7.10 1.20 - 7.70 x10*3/uL    Immature Granulocytes  Absolute, Automated 0.08 0.00 - 0.70 x10*3/uL    Lymphocytes Absolute 0.59 (L) 1.20 - 4.80 x10*3/uL    Monocytes Absolute 0.46 0.10 - 1.00 x10*3/uL    Eosinophils Absolute 0.04 0.00 - 0.70 x10*3/uL    Basophils Absolute 0.04 0.00 - 0.10 x10*3/uL   Renal function panel   Result Value Ref Range    Glucose 108 (H) 74 - 99 mg/dL    Sodium 135 (L) 136 - 145 mmol/L    Potassium 3.8 3.5 - 5.3 mmol/L    Chloride 98 98 - 107 mmol/L    Bicarbonate 31 21 - 32 mmol/L    Anion Gap 10 10 - 20 mmol/L    Urea Nitrogen 8 6 - 23 mg/dL    Creatinine 1.56 (H) 0.50 - 1.05 mg/dL    eGFR 36 (L) >60 mL/min/1.73m*2    Calcium 7.7 (L) 8.6 - 10.3 mg/dL    Phosphorus 1.8 (L) 2.5 - 4.9 mg/dL    Albumin 2.8 (L) 3.4 - 5.0 g/dL   Magnesium   Result Value Ref Range    Magnesium 1.68 1.60 - 2.40 mg/dL   POCT GLUCOSE   Result Value Ref Range    POCT Glucose 135 (H) 74 - 99 mg/dL   Blood Gas Venous Full Panel   Result Value Ref Range    POCT pH, Venous 7.28 (L) 7.33 - 7.43 pH    POCT pCO2, Venous 68 (H) 41 - 51 mm Hg    POCT pO2, Venous 74 (H) 35 - 45 mm Hg    POCT SO2, Venous 97 (H) 45 - 75 %    POCT Oxy Hemoglobin, Venous 95.7 (H) 45.0 - 75.0 %    POCT Hematocrit Calculated, Venous 25.0 (L) 36.0 - 46.0 %    POCT Sodium, Venous 130 (L) 136 - 145 mmol/L    POCT Potassium, Venous 3.8 3.5 - 5.3 mmol/L    POCT Chloride, Venous 99 98 - 107 mmol/L    POCT Ionized Calicum, Venous 1.21 1.10 - 1.33 mmol/L    POCT Glucose, Venous 105 (H) 74 - 99 mg/dL    POCT Lactate, Venous 0.5 0.4 - 2.0 mmol/L    POCT Base Excess, Venous 4.3 (H) -2.0 - 3.0 mmol/L    POCT HCO3 Calculated, Venous 32.0 (H) 22.0 - 26.0 mmol/L    POCT Hemoglobin, Venous 8.2 (L) 12.0 - 16.0 g/dL    POCT Anion Gap, Venous 3.0 (L) 10.0 - 25.0 mmol/L    Patient Temperature 37.0 degrees Celsius    FiO2 36 %   APTT   Result Value Ref Range    aPTT 31.7 22.0 - 32.5 seconds   CBC and Auto Differential   Result Value Ref Range    WBC 7.7 4.4 - 11.3 x10*3/uL    nRBC 0.0 0.0 - 0.0 /100 WBCs    RBC 2.70 (L)  4.00 - 5.20 x10*6/uL    Hemoglobin 8.3 (L) 12.0 - 16.0 g/dL    Hematocrit 27.1 (L) 36.0 - 46.0 %     80 - 100 fL    MCH 30.7 26.0 - 34.0 pg    MCHC 30.6 (L) 32.0 - 36.0 g/dL    RDW 13.5 11.5 - 14.5 %    Platelets 154 150 - 450 x10*3/uL    Neutrophils % 79.7 40.0 - 80.0 %    Immature Granulocytes %, Automated 1.6 (H) 0.0 - 0.9 %    Lymphocytes % 9.1 13.0 - 44.0 %    Monocytes % 8.0 2.0 - 10.0 %    Eosinophils % 1.2 0.0 - 6.0 %    Basophils % 0.4 0.0 - 2.0 %    Neutrophils Absolute 6.11 1.20 - 7.70 x10*3/uL    Immature Granulocytes Absolute, Automated 0.12 0.00 - 0.70 x10*3/uL    Lymphocytes Absolute 0.70 (L) 1.20 - 4.80 x10*3/uL    Monocytes Absolute 0.61 0.10 - 1.00 x10*3/uL    Eosinophils Absolute 0.09 0.00 - 0.70 x10*3/uL    Basophils Absolute 0.03 0.00 - 0.10 x10*3/uL   Renal function panel   Result Value Ref Range    Glucose 106 (H) 74 - 99 mg/dL    Sodium 135 (L) 136 - 145 mmol/L    Potassium 3.7 3.5 - 5.3 mmol/L    Chloride 97 (L) 98 - 107 mmol/L    Bicarbonate 32 21 - 32 mmol/L    Anion Gap 10 10 - 20 mmol/L    Urea Nitrogen 12 6 - 23 mg/dL    Creatinine 1.86 (H) 0.50 - 1.05 mg/dL    eGFR 29 (L) >60 mL/min/1.73m*2    Calcium 7.9 (L) 8.6 - 10.3 mg/dL    Phosphorus 4.3 2.5 - 4.9 mg/dL    Albumin 2.6 (L) 3.4 - 5.0 g/dL   Magnesium   Result Value Ref Range    Magnesium 2.41 (H) 1.60 - 2.40 mg/dL   POCT GLUCOSE   Result Value Ref Range    POCT Glucose 105 (H) 74 - 99 mg/dL   POCT GLUCOSE   Result Value Ref Range    POCT Glucose 153 (H) 74 - 99 mg/dL     *Note: Due to a large number of results and/or encounters for the requested time period, some results have not been displayed. A complete set of results can be found in Results Review.         SIGNATURE: Joseph Mitchell MD PATIENT NAME: Daphne Morris   DATE: January 16, 2025 MRN: [id number]   TIME: 12:58 PM PAGER: [phone]

## 2025-01-16 NOTE — POST-PROCEDURE NOTE
..Report to Receiving RN:    Report To: Abby Dixon RN   Time Report Called: 1620   Hand-Off Communication: pt received 2hrs UF removal.  Complications During Treatment: No  Ultrafiltration Treatment: Yes, 1.5L  Medications Administered During Dialysis: No  Blood Products Administered During Dialysis: No  Labs Sent During Dialysis: No  Heparin Drip Rate Changes: No  Dialysis Catheter Dressing: clean, dry, and intact  Last Dressing Change: 1/15/25    Electronic Signatures:   (Signed )   Authored:    (Signed )   Authored:     Last Updated: 4:32 PM by JAYLAN FIGUEROA

## 2025-01-16 NOTE — H&P
[facility] Critical Care Medicine History & Physical      Date:  1/15/2025  Patient:  Daphne Morris  YOB: 1955  MRN:  [id number]   Admit Date:  1/10/2025  No chief complaint on file.       History of Present Illness:  Daphne Morris is a 69 year old female patient with pmhx of ESRD, anemia, afib, chf, copd, CVA, depression dm, chronic hypoxic respiratory failure on 3L O2 at home, gerd hyperlipidemia, hypertension, hypothyroid, renal cell carcinoma s/p resection in 8/21 and vascultitis. Patient was admitted 1/9 at [facility] for clotted AV fistula and transferred to [facility] to be seen by vascular surgery. Plan was for patitent to be taken to IR for thrombectomy and placement of dialysis line. Patient on arrival was lethargic and not responding.     Was brought to ICU on 1/10 for hypercapnic hypoxic respiratory failure and altered mental status. Was placed on Bipap for correction of altered mental status. HD line placed at bedside with emergent dialysis performed that night with 2L removal. Patient was taken off bipap on 1/11 with HD and 2L removed. Patient downgraded to stepdown status.    1/13 patient went to IR for administration of TPA in AV graft.    1/15 Patient to IR for placement of tunneled central hemodialysis catheter. Patient administered 50mcg of fentanyl and 1mg ativan. On return to floor patient difficult to arrouse, abg obtained showing hypoxic/hypercapnic respiratory failure. Placed on bipap and transferred to ICU.    ROS:  Review of Systems   Unable to perform ROS: Acuity of condition       Medical History:  Past Medical History:   Diagnosis Date    Anal fissure 10/12/2023    Anemia     ASHD (arteriosclerotic heart disease)     At risk for falls     Atrial fibrillation (Multi)     CHF (congestive heart failure)     CKD (chronic kidney disease)     COPD (chronic obstructive pulmonary disease) (Multi)     CVA (cerebral vascular accident) (Multi)     2021- right-sided weakness    Depression   "   Diabetes mellitus (Multi)     GERD (gastroesophageal reflux disease)     H/O pleural effusion     Hyperlipidemia     Hypertension     Hypothyroidism     Hypoxia     Impacted cerumen, left ear     Impacted cerumen of left ear    Noncompliance     Osteoarthritis     Perianal dermatitis 10/12/2023    Personal history of other diseases of the respiratory system     History of acute bronchitis    PVD (peripheral vascular disease) (CMS-[facility])     Renal carcinoma, right (Multi)     s/p partial nephrectomy 2021    Respiratory failure (Multi)     TIA (transient ischemic attack)     Vasculitis (CMS-HCC) 10/12/2023    Weakness      Past Surgical History:   Procedure Laterality Date    ANKLE SURGERY          CARDIAC CATHETERIZATION N/A 2024    Procedure: Right Heart Cath;  Surgeon: Nicholas Antonio MD;  Location: [facility] Cardiac Cath Lab;  Service: Cardiovascular;  Laterality: N/A;    CATARACT EXTRACTION Right     2019     SECTION, CLASSIC      MR CHEST ANGIO W AND WO IV CONTRAST  2023    MR CHEST ANGIO W AND WO IV CONTRAST 2023 [facility] MRI    MR HEAD ANGIO WO IV CONTRAST  2021    MR HEAD ANGIO WO IV CONTRAST [facility]    NEPHRECTOMY  2021    SHOULDER SURGERY           Medications Prior to Admission   Medication Sig Dispense Refill Last Dose/Taking    acetaminophen (Tylenol) 500 mg tablet Take 2 tablets (1,000 mg) by mouth 2 times a day.       albuterol 90 mcg/actuation inhaler INHALE 2 PUFFS BY MOUTH EVERY 4 HOURS AS NEEDED 8.5 g 0     amiodarone (Pacerone) 200 mg tablet TAKE 1 TABLET BY MOUTH ONCE DAILY WITH BREAKFAST. 90 tablet 0     amLODIPine (Norvasc) 5 mg tablet Take 1 tablet (5 mg) by mouth once daily.       aspirin 81 mg EC tablet Take 1 tablet (81 mg) by mouth once daily.       atorvastatin (Lipitor) 40 mg tablet TAKE ONE TABLET BY MOUTH EVERY DAY 90 tablet 3     BD Calista 2nd Gen Pen Needle 32 gauge x \" needle USE SUBCUTANEOUSLY AS DIRECTED TWICE DAILY " 200 each 2     clopidogrel (Plavix) 75 mg tablet Take 1 tablet (75 mg) by mouth once daily.       ferrous gluconate 324 (38 Fe) mg tablet Take 1 tablet (324 mg) by mouth once daily with breakfast. 30 tablet 3     FreeStyle Shakir 14 Day Sensor kit USE AS DIRECTED TO CHECK SUGARS 3 TO 4 TIMES DAILY 1 each 11     FreeStyle Shakir 2 Yreka misc Use as instructed 1 each 0     FreeStyle Shakir 3 Yreka misc Use as instructed 1 each 0     FreeStyle Shakir 3 Sensor device Change sensor Q 14 days 2 each 11     HumaLOG KwikPen Insulin 100 unit/mL injection up to 15 units Subcutaneous three times a day per sliding scaleup to 15 units Subcutaneous three times a day per sliding scale (Patient taking differently: up to 15 units Subcutaneous three times a day per sliding scale.      151-200 2 units  201-250 4 units  251-300 6 units  301-350 8 units  351-400 10 units) 5 each 3     ipratropium-albuteroL (Duo-Neb) 0.5-2.5 mg/3 mL nebulizer solution INHALE THE CONTENTS OF 1 VIAL VIA NEBULIZER EVERY 6 HOURS AS NEEDED. 360 mL 0     levothyroxine (Synthroid, Levoxyl) 200 mcg tablet Take 1 tablet by mouth once daily in the morning before a meal. 90 tablet 3     levothyroxine (Synthroid, Levoxyl) 50 mcg tablet Take 1 tablet (50 mcg) by mouth once daily in the morning. Take before meals. 90 tablet 3     metoprolol succinate XL (Toprol-XL) 50 mg 24 hr tablet TAKE ONE TABLET BY MOUTH TWO TIMES A  tablet 0     oxygen DME/Hospice (O2) therapy Inhale 3 L/min once daily at bedtime. Indications: decreased oxygen in the tissues or blood       pantoprazole (ProtoNix) 40 mg EC tablet TAKE ONE TABLET BY MOUTH TWO TIMES A DAY 60 tablet 0     venlafaxine XR (Effexor-XR) 75 mg 24 hr capsule Take 2 capsules (150 mg) by mouth once daily. 180 capsule 3      Bee venom protein (honey bee); Citalopram; Codeine; Influenza virus vaccines; Latex; Latex, natural rubber; Lisinopril; Penicillins; Adhesive tape-silicones; Amoxicillin; Doxycycline; Oseltamivir;  Phenyleph-min oil-petrolatum; Phenyleph-shark oil-glyc-pet; Phenylephrine; Prednisone; Tuberculin ppd; and Xylometazoline  Social History     Tobacco Use    Smoking status: Every Day     Current packs/day: 0.50     Average packs/day: 0.5 packs/day for 56.0 years (28.0 ttl pk-yrs)     Types: Cigarettes     Start date: 1/1/1969     Passive exposure: Never    Smokeless tobacco: Never   Vaping Use    Vaping status: Never Used   Substance Use Topics    Alcohol use: Not Currently    Drug use: Not Currently     Types: Marijuana     Family History   Problem Relation Name Age of Onset    Hypertension Mother      Diabetes Father      Heart disease Father      Cancer Sister      Cancer Brother      Diabetes Daughter      Asthma Daughter      Heart attack Daughter      Diabetes Maternal Grandfather      Heart disease Paternal Grandmother      Diabetes Other      Hypertension Other      COPD Other      Other (chronic lung disease) Other         Hospital Medications:         Current Facility-Administered Medications:     acetaminophen (Tylenol) tablet 650 mg, 650 mg, oral, q4h PRN **OR** acetaminophen (Tylenol) oral liquid 650 mg, 650 mg, oral, q4h PRN **OR** acetaminophen (Tylenol) suppository 650 mg, 650 mg, rectal, q4h PRN, Renea Fontana PA-C    albuterol 2.5 mg /3 mL (0.083 %) nebulizer solution 2.5 mg, 2.5 mg, nebulization, q6h PRN, GUEVARA HollisC, 2.5 mg at 01/14/25 1533    amiodarone (Pacerone) tablet 200 mg, 200 mg, oral, Daily with breakfast, Renea Fontana PA-C, 200 mg at 01/14/25 0832    amLODIPine (Norvasc) tablet 5 mg, 5 mg, oral, Daily, GUEVARA HollisC, 5 mg at 01/14/25 0832    aspirin EC tablet 81 mg, 81 mg, oral, Daily, Renea Fontana PA-C, 81 mg at 01/14/25 0832    atorvastatin (Lipitor) tablet 40 mg, 40 mg, oral, Nightly, MG Hollis-C, 40 mg at 01/14/25 2016    benzocaine-menthol (Cepastat Sore Throat) lozenge 1 lozenge, 1 lozenge, Mouth/Throat, q2h PRN, Renea Fontana PA-C     bisacodyl (Dulcolax) EC tablet 10 mg, 10 mg, oral, Daily PRN, Renea Fontana PA-C    [Held by provider] clopidogrel (Plavix) tablet 75 mg, 75 mg, oral, Daily, Renea Fontana PA-C    dextromethorphan-guaifenesin (Robitussin DM)  mg/5 mL oral liquid 5 mL, 5 mL, oral, q4h PRN, Renea Fontana PA-C, 5 mL at 01/13/25 0451    dextrose 50 % injection 12.5 g, 12.5 g, intravenous, q15 min PRN, Renea Fontana PA-C, 12.5 g at 01/10/25 2000    dextrose 50 % injection 25 g, 25 g, intravenous, q15 min PRN, Renea Fontana PA-C    epoetin debra-epbx (Retacrit) injection 5,000 Units, 5,000 Units, subcutaneous, Once per day on Monday Wednesday Friday, Renea Fontana PA-C, 5,000 Units at 01/13/25 1818    glucagon (Glucagen) injection 1 mg, 1 mg, intramuscular, q15 min PRN, Renea Fontana PA-C    glucagon (Glucagen) injection 1 mg, 1 mg, intramuscular, q15 min PRN, Renea Fontana PA-C    guaiFENesin (Mucinex) 12 hr tablet 600 mg, 600 mg, oral, q12h PRN, Renea Fontana PA-C    heparin (porcine) injection 2,000-4,000 Units, 2,000-4,000 Units, intravenous, PRN, eRnea Fontana PA-C    heparin 1,000 unit/mL injection 2,000 Units, 2,000 Units, intra-catheter, After Dialysis, Renea Fontana PA-C, 1,200 Units at 01/13/25 1139    heparin 1,000 unit/mL injection 2,000 Units, 2,000 Units, intra-catheter, After Dialysis, Renea Fontana PA-C, 1,200 Units at 01/13/25 1139    insulin lispro injection 0-5 Units, 0-5 Units, subcutaneous, TID AC, Renea Fontana PA-C, 4 Units at 01/14/25 1710    ipratropium-albuteroL (Duo-Neb) 0.5-2.5 mg/3 mL nebulizer solution 3 mL, 3 mL, nebulization, 4x daily, Renea Fontana PA-C, 3 mL at 01/15/25 1943    levothyroxine (Synthroid, Levoxyl) tablet 200 mcg, 200 mcg, oral, Daily, Renea Fontana PA-C, 200 mcg at 01/15/25 0543    levothyroxine (Synthroid, Levoxyl) tablet 50 mcg, 50 mcg, oral, Daily, Renea Fontana PA-C, 50 mcg at 01/15/25 0544    melatonin tablet 10 mg, 10 mg, oral,  Nightly PRN, Renea Fontana PA-C, 10 mg at 01/14/25 2022    metoprolol succinate XL (Toprol-XL) 24 hr tablet 50 mg, 50 mg, oral, Daily, Renea Fontana PA-C, 50 mg at 01/14/25 0832    oxygen (O2) therapy (Peds), , inhalation, Continuous PRN - O2/gases, Renea Fontana PA-C    oxygen (O2) therapy, , inhalation, Continuous - Inhalation, Renea Fontana PA-C, 30 percent at 01/15/25 1943    pantoprazole (ProtoNix) injection 40 mg, 40 mg, intravenous, BID, Renea Fontana PA-C, 40 mg at 01/14/25 2016    polyethylene glycol (Glycolax, Miralax) packet 17 g, 17 g, oral, Daily PRN, Renea Fontana PA-C    prochlorperazine (Compazine) tablet 10 mg, 10 mg, oral, q6h PRN **OR** prochlorperazine (Compazine) injection 10 mg, 10 mg, intravenous, q6h PRN **OR** prochlorperazine (Compazine) suppository 25 mg, 25 mg, rectal, q12h PRN, Renea Fontana PA-C    sodium chloride 3 % nebulizer solution 3 mL, 3 mL, nebulization, 4x daily, Renea Fontana PA-C, 3 mL at 01/15/25 1943    venlafaxine XR (Effexor-XR) 24 hr capsule 150 mg, 150 mg, oral, Daily, Renea Fontana PA-C, 150 mg at 01/14/25 0832    Physical Exam:  Heart Rate:  []   Temp:  [36.5 °C (97.7 °F)-37.1 °C (98.8 °F)]   Resp:  [16-24]   BP: (115-147)/(52-78)   Weight:  [70.3 kg (154 lb 15.7 oz)]   SpO2:  [94 %-100 %]     FiO2 (%):  [30 %] 30 %  S RR:  [16] 16    Physical Exam  Constitutional:       Appearance: She is underweight.      Interventions: Face mask in place.      Comments: somnolent   HENT:      Mouth/Throat:      Mouth: Mucous membranes are moist.      Pharynx: Oropharynx is clear.   Eyes:      Pupils: Pupils are equal, round, and reactive to light.   Cardiovascular:      Rate and Rhythm: Normal rate and regular rhythm.      Pulses: Normal pulses.   Pulmonary:      Effort: Pulmonary effort is normal.   Abdominal:      General: Abdomen is flat.      Palpations: Abdomen is soft.   Musculoskeletal:         General: Normal range of motion.   Skin:      General: Skin is warm and dry.      Capillary Refill: Capillary refill takes less than 2 seconds.   Neurological:      Mental Status: She is disoriented.         Objective:    Intake/Output Summary (Last 24 hours) at 1/15/2025 2042  Last data filed at 1/15/2025 1700  Gross per 24 hour   Intake 1292.77 ml   Output 2811 ml   Net -1518.23 ml       Results for orders placed or performed during the hospital encounter of 01/10/25 (from the past 24 hours)   CBC and Auto Differential   Result Value Ref Range    WBC 8.7 4.4 - 11.3 x10*3/uL    nRBC 0.0 0.0 - 0.0 /100 WBCs    RBC 2.45 (L) 4.00 - 5.20 x10*6/uL    Hemoglobin 7.6 (L) 12.0 - 16.0 g/dL    Hematocrit 24.5 (L) 36.0 - 46.0 %     80 - 100 fL    MCH 31.0 26.0 - 34.0 pg    MCHC 31.0 (L) 32.0 - 36.0 g/dL    RDW 13.5 11.5 - 14.5 %    Platelets 146 (L) 150 - 450 x10*3/uL    Neutrophils % 80.5 40.0 - 80.0 %    Immature Granulocytes %, Automated 2.0 (H) 0.0 - 0.9 %    Lymphocytes % 9.1 13.0 - 44.0 %    Monocytes % 7.5 2.0 - 10.0 %    Eosinophils % 0.7 0.0 - 6.0 %    Basophils % 0.2 0.0 - 2.0 %    Neutrophils Absolute 6.97 1.20 - 7.70 x10*3/uL    Immature Granulocytes Absolute, Automated 0.17 0.00 - 0.70 x10*3/uL    Lymphocytes Absolute 0.79 (L) 1.20 - 4.80 x10*3/uL    Monocytes Absolute 0.65 0.10 - 1.00 x10*3/uL    Eosinophils Absolute 0.06 0.00 - 0.70 x10*3/uL    Basophils Absolute 0.02 0.00 - 0.10 x10*3/uL   aPTT   Result Value Ref Range    aPTT 41.4 (H) 22.0 - 32.5 seconds   POCT GLUCOSE   Result Value Ref Range    POCT Glucose 140 (H) 74 - 99 mg/dL   POCT GLUCOSE   Result Value Ref Range    POCT Glucose 116 (H) 74 - 99 mg/dL   POCT GLUCOSE   Result Value Ref Range    POCT Glucose 112 (H) 74 - 99 mg/dL   POCT GLUCOSE   Result Value Ref Range    POCT Glucose 105 (H) 74 - 99 mg/dL   Blood Gas Arterial Full Panel   Result Value Ref Range    POCT pH, Arterial 7.27 (L) 7.38 - 7.42 pH    POCT pCO2, Arterial 74 (HH) 38 - 42 mm Hg    POCT pO2, Arterial 71 (L) 85 - 95 mm Hg     POCT SO2, Arterial 96 94 - 100 %    POCT Oxy Hemoglobin, Arterial 94.6 94.0 - 98.0 %    POCT Hematocrit Calculated, Arterial 26.0 (L) 36.0 - 46.0 %    POCT Sodium, Arterial 132 (L) 136 - 145 mmol/L    POCT Potassium, Arterial 3.4 (L) 3.5 - 5.3 mmol/L    POCT Chloride, Arterial 99 98 - 107 mmol/L    POCT Ionized Calcium, Arterial 1.19 1.10 - 1.33 mmol/L    POCT Glucose, Arterial 120 (H) 74 - 99 mg/dL    POCT Lactate, Arterial 0.6 0.4 - 2.0 mmol/L    POCT Base Excess, Arterial 5.8 (H) -2.0 - 3.0 mmol/L    POCT HCO3 Calculated, Arterial 34.0 (H) 22.0 - 26.0 mmol/L    POCT Hemoglobin, Arterial 8.6 (L) 12.0 - 16.0 g/dL    POCT Anion Gap, Arterial 2 (L) 10 - 25 mmo/L    Patient Temperature 37.0 degrees Celsius    FiO2 36 %    Apparatus CANNULA     Flow 4.0 LPM    Critical Called By BLANCA BARNETT     Critical Called To DR. COLLINS     Critical Call Time 1735     Critical Read Back Y     Critical Note PCO2 74     Site of Arterial Puncture Radial Right     Jonathan's Test Positive    Blood Gas Arterial Full Panel   Result Value Ref Range    POCT pH, Arterial 7.38 7.38 - 7.42 pH    POCT pCO2, Arterial 58 (H) 38 - 42 mm Hg    POCT pO2, Arterial 74 (L) 85 - 95 mm Hg    POCT SO2, Arterial 98 94 - 100 %    POCT Oxy Hemoglobin, Arterial 95.9 94.0 - 98.0 %    POCT Hematocrit Calculated, Arterial 25.0 (L) 36.0 - 46.0 %    POCT Sodium, Arterial 132 (L) 136 - 145 mmol/L    POCT Potassium, Arterial 3.6 3.5 - 5.3 mmol/L    POCT Chloride, Arterial 99 98 - 107 mmol/L    POCT Ionized Calcium, Arterial 1.18 1.10 - 1.33 mmol/L    POCT Glucose, Arterial 110 (H) 74 - 99 mg/dL    POCT Lactate, Arterial 0.5 0.4 - 2.0 mmol/L    POCT Base Excess, Arterial 8.0 (H) -2.0 - 3.0 mmol/L    POCT HCO3 Calculated, Arterial 34.3 (H) 22.0 - 26.0 mmol/L    POCT Hemoglobin, Arterial 8.4 (L) 12.0 - 16.0 g/dL    POCT Anion Gap, Arterial 2 (L) 10 - 25 mmo/L    Patient Temperature 37.0 degrees Celsius    FiO2 30 %    Apparatus FACE MASK     Ventilator Mode  BiPAP     Ventilator Rate 16 bpm    Ipap CMH2O 20.0 cm H2O    Epap CMH2O 8.0 cm H2O    Site of Arterial Puncture Radial Left     Joanthan's Test Positive    CBC and Auto Differential   Result Value Ref Range    WBC 8.3 4.4 - 11.3 x10*3/uL    nRBC 0.2 (H) 0.0 - 0.0 /100 WBCs    RBC 2.81 (L) 4.00 - 5.20 x10*6/uL    Hemoglobin 8.7 (L) 12.0 - 16.0 g/dL    Hematocrit 28.3 (L) 36.0 - 46.0 %     (H) 80 - 100 fL    MCH 31.0 26.0 - 34.0 pg    MCHC 30.7 (L) 32.0 - 36.0 g/dL    RDW 13.6 11.5 - 14.5 %    Platelets 159 150 - 450 x10*3/uL    Neutrophils % 85.4 40.0 - 80.0 %    Immature Granulocytes %, Automated 1.0 (H) 0.0 - 0.9 %    Lymphocytes % 7.1 13.0 - 44.0 %    Monocytes % 5.5 2.0 - 10.0 %    Eosinophils % 0.5 0.0 - 6.0 %    Basophils % 0.5 0.0 - 2.0 %    Neutrophils Absolute 7.10 1.20 - 7.70 x10*3/uL    Immature Granulocytes Absolute, Automated 0.08 0.00 - 0.70 x10*3/uL    Lymphocytes Absolute 0.59 (L) 1.20 - 4.80 x10*3/uL    Monocytes Absolute 0.46 0.10 - 1.00 x10*3/uL    Eosinophils Absolute 0.04 0.00 - 0.70 x10*3/uL    Basophils Absolute 0.04 0.00 - 0.10 x10*3/uL   Renal function panel   Result Value Ref Range    Glucose 108 (H) 74 - 99 mg/dL    Sodium 135 (L) 136 - 145 mmol/L    Potassium 3.8 3.5 - 5.3 mmol/L    Chloride 98 98 - 107 mmol/L    Bicarbonate 31 21 - 32 mmol/L    Anion Gap 10 10 - 20 mmol/L    Urea Nitrogen 8 6 - 23 mg/dL    Creatinine 1.56 (H) 0.50 - 1.05 mg/dL    eGFR 36 (L) >60 mL/min/1.73m*2    Calcium 7.7 (L) 8.6 - 10.3 mg/dL    Phosphorus 1.8 (L) 2.5 - 4.9 mg/dL    Albumin 2.8 (L) 3.4 - 5.0 g/dL   Magnesium   Result Value Ref Range    Magnesium 1.68 1.60 - 2.40 mg/dL     *Note: Due to a large number of results and/or encounters for the requested time period, some results have not been displayed. A complete set of results can be found in Results Review.       Recent Imaging  XR chest 1 view   Final Result   Compared to 01/11/2025, there is improvement in pulmonary edema and   right pleural  effusion but considerable residual edema remains and   residual moderate right effusion noted.             MACRO:   None.        Signed by: Erlin Malcolm 1/15/2025 6:56 PM   Dictation workstation:   [id number]      US guided percutaneous placement   Final Result   Ultrasound-guided vascular access into arterial and venous limb of   right upper extremity AV graft. Administration of thrombolytic into   the thrombosed graft.             Signed by: George Crane 1/13/2025 2:13 PM   Dictation workstation:   [id number]      XR chest 1 view   Final Result   Cardiomegaly with bilateral infiltrates and possible bilateral   effusions. Accounting for differences in patient positioning the   findings are thought to be similar compared to the study from   01/10/2025.        MACRO:   none        Signed by: Silver Vargas 1/11/2025 10:50 AM   Dictation workstation:   [id number]      XR chest 1 view   Final Result   Bilateral parenchymal infiltration. Probable right pleural effusion.        MACRO:   none        Signed by: Nuha Cheatham 1/10/2025 3:27 PM   Dictation workstation:   [id number]      CT head wo IV contrast   Final Result   No acute intracranial pathologic findings are identified.   Age-related intracranial findings are present, stable.   Abnormal appearance of the right eye for which clinical correlation   is recommended as above        MACRO:   none        Signed by: Silver Vargas 1/10/2025 2:07 PM   Dictation workstation:   [id number]      IR angiogram fistula graft declot    (Results Pending)   IR CVC tunneled    (Results Pending)       No echocardiogram results found for the past 14 days    Assessment/Plan   Assessment & Plan  Clotted renal dialysis AV graft (CMS-[facility])    ESRD (end stage renal disease) (Multi)        Plan:  Neuro/Psych/Pain Ctrl/Sedation:  Acute hypoxic/hypercapnic encephalopathy 2/2 to over utilization of sedation meds  Hx of depression, cva  Ct head 1/10: no acute intracranial  pathology  - CAM ICU score q-shift  - Sleep/wake cycle hygiene  - Delirium precaution   - Pain Management: tylenol prn  - Q4 Neuro assessments  - Continue Effexor     Respiratory/ENT:  Acute on chronic hypercapnic, hypoxemic respiratory failure  COPD  Right sided pleural effusion  CXR 1/9: vascular congestion, bilateral pleural effusions  Admitted to ICU on Bipap 20/8 40%, wean as appropriate  R sided thoracentesis with 1L removal on 12/31  - Continuous Pulse oximetry  - Wean Fio2 to maintain Spo2>92%  - Bronchial hygiene and IS  - Duonebs q6  - Will trial NC today  - Bipap at night, can transition to NC when awake and following     Cardiovascular:  Thrombosis of AF fistula  Hx of afib, hypertension, hyperlipid, HFpEF, vasculitis   Echo 12/24: EF of 55-60%, elevated left atrial pressure, severly increased septal and posterior left ventricular wall thickness, severely enlarge LV hypertrophy  Carotid artery duplex 12/24: Right and Left carotid with less than 50% occlusion, greater 50% stenosis in left subclavian artery  1/13 AV graft injected with TPA  1/15 Placement of tunneled HD catheter  - Continuous Telemetry  - Systolic goals >90 and < 160  - Vascular surgery consulted  - Continue home amlodipine lipitor, metoprolol, amiodarone, asa  - Holding plavix     Renal/Volume Status (Intra & Extravascular):  ESRD  - Maintain urine output 0.5-1.0cc/kg/hr  - Monitor I/O's  - Replete electrolytes to maintain K >4.0 and Mg >2.0  - Daily BMP, Mg   - Nephrology consulted  - -6.5L since admission     GI:  Hx of GERD  - Diet: NPO with sips with meds while requiring bipap  - PPI: home protonix  - BR: miralax     Infectious Disease:  Positive flu A on 12/31  No infectious disease concerns at this time  - Pleural fluid tap negative  - Daily CBC     Heme/Onc:  Chronic anemia  Hx of renal cell carcinoma s/p resection '21  - DVT Prophylaxis: SCDs and heparin  - Transfuse for hemoglobin < 7  - Continue epoetin injection with HD      Endocrine:  Hypoglycemia, possible symptomatic  Hypothyroid  - POCT q4  - HGB A1C 8.1 (1/1/25)  - TSH 2.6  - Continue home synthroid     OBGYN/Ortho:  - PT/OT when stable     Ethics/Code Status:  DNR DNI  Consider palliative consult if no respiratory improvment     :  DVT Prophylaxis: SCDs and heparin  GI Prophylaxis: PPI home  Bowel Regimen: Miralax prn  Diet: NPO with sips with meds  CVC: RIJ trialysis  Fabiola: None  Fay: None  Restraints: none  Dispo: Will be admitted to ICU    Critical Care Time:  60 minutes spent in preparing to see patient (I.e. review of medical records), evaluation of diagnostics (I.e. labs, imaging, etc.), documentation, discussing plan of care with patient/ family/ caregiver, and/ or coordination of care with multidisciplinary team. Time does not include completion of procedure time.     Critical Care Activities:     Tyrone Ramesh, APRN-CNP

## 2025-01-16 NOTE — PROGRESS NOTES
Patient not medically clear. Patient transferred to the ICU. The discharge plan remains to go to [facility]. Precert is approved through 1/21. Will follow.    **DO NOT DISCHARGE PATIENT WITHOUT SPEAKING TO CASE MANAGEMENT     01/16/25 1965   Discharge Planning   Home or Post Acute Services Post acute facilities (Rehab/SNF/etc)   Type of Post Acute Facility Services Rehab;Skilled nursing   Expected Discharge Disposition SNF  ([facility])   Does the patient need discharge transport arranged? Yes   RoundTrip coordination needed? Yes

## 2025-01-16 NOTE — PROGRESS NOTES
Occupational Therapy    Occupational Therapy Treatment    Name: Daphne Morris  MRN: [id number]  Department: [facility] DIALYSIS  Room: 13/13-A  Date: 01/16/25  Time Calculation  Start Time: 1020  Stop Time: 1045  Time Calculation (min): 25 min    Assessment:  OT Assessment: Pt with gradual progress towards functional goals. Pt demonstrated impaired ADLs and functional transfers with impaired cognition and aerobic capacity. Pt would benefit from continued acute OT services to facilitate return to PLOF.  Prognosis: Fair  Barriers to Discharge Home: Caregiver assistance, Physical needs  Caregiver Assistance: Patient lives alone and/or does not have reliable caregiver assistance  Physical Needs: Stair navigation into home limited by function/safety, Ambulating household distances limited by function/safety, 24hr mobility assistance needed, 24hr ADL assistance needed  Evaluation/Treatment Tolerance: Patient limited by fatigue  Medical Staff Made Aware: Yes  End of Session Communication: Bedside nurse  End of Session Patient Position: Up in chair, Alarm on  Plan:  Treatment Interventions: ADL retraining, Functional transfer training, Endurance training, Cognitive reorientation, Patient/family training, Equipment evaluation/education, Compensatory technique education  OT Frequency: 4 times per week  OT Discharge Recommendations: Moderate intensity level of continued care  Equipment Recommended upon Discharge: Wheeled walker  OT Recommended Transfer Status: Moderate assist, Assist of 2  OT - OK to Discharge: Yes    Subjective   Previous Visit Info:  OT Last Visit  OT Received On: 01/16/25  General:  General  Reason for Referral: activities of daily living, clotted renal dialysis AV graft  Referred By: CARLENE Saini-CNP  Past Medical History Relevant to Rehab: ESRD, anemia, afib, chf, copd, CVA, depression dm, chronic hypoxic respiratory failure on 3L O2 at home, gerd hyperlipidemia, hypertension, hypothyroid, renal cell  carcinoma s/p resection in 8/21 and vascultitis  Family/Caregiver Present: No  Co-Treatment: PT  Co-Treatment Reason: medically complex patient in ICU  Prior to Session Communication: Bedside nurse  Patient Position Received: Bed, 3 rail up, Alarm on  Preferred Learning Style: verbal, visual  General Comment: Patient is a 68 yo woman admit with clotted renal dialysis AV graft. Pt went to IR for placement of tunneled central hemodialysis catheter and was administered 50mcg of fentanyl and 1mg ativan. On return to floor patient difficult to arouse, abg obtained showing hypoxic/hypercapnic respiratory failure. Placed on bipap and transferred to ICU  Precautions:  Hearing/Visual Limitations: [place] wears bilateral hearing aides, wears glasses  Medical Precautions: Fall precautions, Oxygen therapy device and L/min (3L O2)  Precautions Comment: R UE limb precaution      Vital Signs Comment: 102 HR, SPO2 97% /65 map 80    Pain Assessment:  Pain Assessment  Pain Assessment: 0-10  0-10 (Numeric) Pain Score: 0 - No pain     Objective   Cognition:  Overall Cognitive Status: Impaired  Orientation Level: Oriented X4  Following Commands: Follows one step commands with increased time  Safety Judgment: Decreased awareness of need for assistance  Cognition Comments: Pt with limited insight into current functional abilities, required cues to continue and attend to tasks  Safety/Judgement:  (impaired)  Activities of Daily Living:      Grooming  Grooming Level of Assistance: Moderate assistance  Grooming Where Assessed: Chair  Grooming Comments: Pt able to initiate combing hair, required assist due to matting in the back. Pt required assist to soak and scrub fingernails with toothbrush due to eggs and dirt under nails. Pt limited by impaired vision, glasses not available    Bed Mobility/Transfers: Bed Mobility  Bed Mobility: Yes  Bed Mobility 1  Bed Mobility 1: Supine to sitting  Level of Assistance 1: Moderate assistance, +2  Bed  "Mobility Comments 1: Pt able to initiate BLes to EOB with cues, assist for trunk up and scot to EOB using draw sheet with mod A x2    Transfers  Transfer: Yes  Transfer 1  Transfer From 1: Bed to  Transfer to 1: Chair with arms  Technique 1: Stand pivot  Transfer Level of Assistance 1: Moderate assistance, +2  Trials/Comments 1: Pt requesting to do the transfer \"her way\" despite education. Pt attempting SPTto chair, required max A x2 for elevation from bed and transfer over to chair as pt unable to take steps.    Outcome Measures:  [facility] Daily Activity  Putting on and taking off regular lower body clothing: A lot  Bathing (including washing, rinsing, drying): A lot  Putting on and taking off regular upper body clothing: A lot  Toileting, which includes using toilet, bedpan or urinal: A lot  Taking care of personal grooming such as brushing teeth: A lot  Eating Meals: A little  Daily Activity - Total Score: 13        Education Documentation  Body Mechanics, taught by Sury Lui OT at 1/16/2025  1:47 PM.  Learner: Patient  Readiness: Acceptance  Method: Explanation  Response: Verbalizes Understanding  Comment: Pt edu on OT POC    Precautions, taught by Sury Lui OT at 1/16/2025  1:47 PM.  Learner: Patient  Readiness: Acceptance  Method: Explanation  Response: Verbalizes Understanding  Comment: Pt edu on OT POC    ADL Training, taught by Sruy Lui OT at 1/16/2025  1:47 PM.  Learner: Patient  Readiness: Acceptance  Method: Explanation  Response: Verbalizes Understanding  Comment: Pt edu on OT POC    Education Comments  Pt educated on occupational therapy plan of care, safety/fall precautions, call light use.         Goals:  Encounter Problems       Encounter Problems (Active)       Bathing       STG - Patient will bathe upper body with minimal assist (Progressing)       Start:  01/11/25    Expected End:  01/20/25               Dressing Upper Extremities       LTG - Patient will complete upper body " [Dear  ___] : Dear  [unfilled], [Consult Letter:] : I had the pleasure of evaluating your patient, [unfilled]. dressing with set up (Progressing)       Start:  01/11/25    Expected End:  01/20/25               Grooming       STG - Patient completes grooming with set up (Progressing)       Start:  01/11/25    Expected End:  01/20/25               OT Transfers       LTG - Patient will perform all functional transfers with 2ww and minimal assist (Progressing)       Start:  01/11/25    Expected End:  02/11/25               Toileting       STG - Patient will complete toileting tasks with 2ww and minimal assist (Progressing)       Start:  01/11/25    Expected End:  01/20/25                  [Please see my note below.] : Please see my note below. [Consult Closing:] : Thank you very much for allowing me to participate in the care of this patient.  If you have any questions, please do not hesitate to contact me. [Sincerely,] : Sincerely, [FreeTextEntry3] : Shae Willingham MD\par  [facility]\par  Pediatric Dermatology

## 2025-01-16 NOTE — PROGRESS NOTES
[facility] Critical Care Medicine       Date:  1/16/2025  Patient:  Daphne Morris  YOB: 1955  MRN:  [id number]   Admit Date:  1/10/2025    No chief complaint on file.        History of Present Illness:  Daphne Morris is a 69 year old female patient with pmhx of ESRD, anemia, afib, chf, copd, CVA, depression dm, chronic hypoxic respiratory failure on 3L O2 at home, gerd hyperlipidemia, hypertension, hypothyroid, renal cell carcinoma s/p resection in 8/21 and vascultitis. Patient was admitted 1/9 at [facility] for clotted AV fistula and transferred to [facility], [place], operated by [facility] to be seen by vascular surgery. Plan was for patitent to be taken to IR for thrombectomy and placement of dialysis line. Patient on arrival was lethargic and not responding.      Was brought to ICU on 1/10 for hypercapnic hypoxic respiratory failure and altered mental status. Was placed on Bipap for correction of altered mental status. HD line placed at bedside with emergent dialysis performed that night with 2L removal. Patient was taken off bipap on 1/11 with HD and 2L removed. Patient downgraded to stepdown status.     1/13 patient went to IR for administration of TPA in AV graft.     1/15 Patient to IR for placement of tunneled central hemodialysis catheter. Patient administered 50mcg of fentanyl and 1mg ativan. On return to floor patient difficult to arrouse, abg obtained showing hypoxic/hypercapnic respiratory failure. Placed on bipap and transferred to ICU.      Interval ICU Events:  1/15: Arrived to ICU on BIPAP hemodynamically stable. AO x 3, repeat ABG pending    1/16: Patient's mentation improved throughout the night. Utilized BiPAP OVN intermittently. CXR this am: Slight worsening of the pulmonary edema with stable moderate-sized right pleural effusion. Will discuss with nephrology dialysis vs thoracentesis.     Medical History:  Past Medical History:   Diagnosis Date    Anal fissure 10/12/2023    Anemia     ASHD (arteriosclerotic heart  disease)     At risk for falls     Atrial fibrillation (Multi)     CHF (congestive heart failure)     CKD (chronic kidney disease)     COPD (chronic obstructive pulmonary disease) (Multi)     CVA (cerebral vascular accident) (Multi)     - right-sided weakness    Depression     Diabetes mellitus (Multi)     GERD (gastroesophageal reflux disease)     H/O pleural effusion     Hyperlipidemia     Hypertension     Hypothyroidism     Hypoxia     Impacted cerumen, left ear     Impacted cerumen of left ear    Noncompliance     Osteoarthritis     Perianal dermatitis 10/12/2023    Personal history of other diseases of the respiratory system     History of acute bronchitis    PVD (peripheral vascular disease) (CMS-HCC)     Renal carcinoma, right (Multi)     s/p partial nephrectomy 2021    Respiratory failure (Multi)     TIA (transient ischemic attack)     Vasculitis (CMS-HCC) 10/12/2023    Weakness      Past Surgical History:   Procedure Laterality Date    ANKLE SURGERY          CARDIAC CATHETERIZATION N/A 2024    Procedure: Right Heart Cath;  Surgeon: Nicholas Antonio MD;  Location: [facility] Cardiac Cath Lab;  Service: Cardiovascular;  Laterality: N/A;    CATARACT EXTRACTION Right     2019     SECTION, CLASSIC      MR CHEST ANGIO W AND WO IV CONTRAST  2023    MR CHEST ANGIO W AND WO IV CONTRAST 2023 [facility] MRI    MR HEAD ANGIO WO IV CONTRAST  2021    MR HEAD ANGIO WO IV CONTRAST [facility]    NEPHRECTOMY  2021    SHOULDER SURGERY           Medications Prior to Admission   Medication Sig Dispense Refill Last Dose/Taking    acetaminophen (Tylenol) 500 mg tablet Take 2 tablets (1,000 mg) by mouth 2 times a day.       albuterol 90 mcg/actuation inhaler INHALE 2 PUFFS BY MOUTH EVERY 4 HOURS AS NEEDED 8.5 g 0     amiodarone (Pacerone) 200 mg tablet TAKE 1 TABLET BY MOUTH ONCE DAILY WITH BREAKFAST. 90 tablet 0     amLODIPine (Norvasc) 5 mg tablet Take 1 tablet (5 mg) by  "mouth once daily.       aspirin 81 mg EC tablet Take 1 tablet (81 mg) by mouth once daily.       atorvastatin (Lipitor) 40 mg tablet TAKE ONE TABLET BY MOUTH EVERY DAY 90 tablet 3     BD Calista 2nd Gen Pen Needle 32 gauge x 5/32\" needle USE SUBCUTANEOUSLY AS DIRECTED TWICE DAILY 200 each 2     clopidogrel (Plavix) 75 mg tablet Take 1 tablet (75 mg) by mouth once daily.       ferrous gluconate 324 (38 Fe) mg tablet Take 1 tablet (324 mg) by mouth once daily with breakfast. 30 tablet 3     FreeStyle Shakir 14 Day Sensor kit USE AS DIRECTED TO CHECK SUGARS 3 TO 4 TIMES DAILY 1 each 11     FreeStyle Shakir 2 Howard misc Use as instructed 1 each 0     FreeStyle Shakir 3 Howard misc Use as instructed 1 each 0     FreeStyle Shakir 3 Sensor device Change sensor Q 14 days 2 each 11     HumaLOG KwikPen Insulin 100 unit/mL injection up to 15 units Subcutaneous three times a day per sliding scaleup to 15 units Subcutaneous three times a day per sliding scale (Patient taking differently: up to 15 units Subcutaneous three times a day per sliding scale.      151-200 2 units  201-250 4 units  251-300 6 units  301-350 8 units  351-400 10 units) 5 each 3     ipratropium-albuteroL (Duo-Neb) 0.5-2.5 mg/3 mL nebulizer solution INHALE THE CONTENTS OF 1 VIAL VIA NEBULIZER EVERY 6 HOURS AS NEEDED. 360 mL 0     levothyroxine (Synthroid, Levoxyl) 200 mcg tablet Take 1 tablet by mouth once daily in the morning before a meal. 90 tablet 3     levothyroxine (Synthroid, Levoxyl) 50 mcg tablet Take 1 tablet (50 mcg) by mouth once daily in the morning. Take before meals. 90 tablet 3     metoprolol succinate XL (Toprol-XL) 50 mg 24 hr tablet TAKE ONE TABLET BY MOUTH TWO TIMES A  tablet 0     oxygen DME/Hospice (O2) therapy Inhale 3 L/min once daily at bedtime. Indications: decreased oxygen in the tissues or blood       pantoprazole (ProtoNix) 40 mg EC tablet TAKE ONE TABLET BY MOUTH TWO TIMES A DAY 60 tablet 0     venlafaxine XR (Effexor-XR) 75 mg " 24 hr capsule Take 2 capsules (150 mg) by mouth once daily. 180 capsule 3      Bee venom protein (honey bee); Citalopram; Codeine; Influenza virus vaccines; Latex; Latex, natural rubber; Lisinopril; Penicillins; Adhesive tape-silicones; Amoxicillin; Doxycycline; Oseltamivir; Phenyleph-min oil-petrolatum; Phenyleph-shark oil-glyc-pet; Phenylephrine; Prednisone; Tuberculin ppd; and Xylometazoline  Social History     Tobacco Use    Smoking status: Every Day     Current packs/day: 0.50     Average packs/day: 0.5 packs/day for 56.0 years (28.0 ttl pk-yrs)     Types: Cigarettes     Start date: 1/1/1969     Passive exposure: Never    Smokeless tobacco: Never   Vaping Use    Vaping status: Never Used   Substance Use Topics    Alcohol use: Not Currently    Drug use: Not Currently     Types: Marijuana     Family History   Problem Relation Name Age of Onset    Hypertension Mother      Diabetes Father      Heart disease Father      Cancer Sister      Cancer Brother      Diabetes Daughter      Asthma Daughter      Heart attack Daughter      Diabetes Maternal Grandfather      Heart disease Paternal Grandmother      Diabetes Other      Hypertension Other      COPD Other      Other (chronic lung disease) Other         Hospital Medications:           Current Facility-Administered Medications:     acetaminophen (Tylenol) tablet 650 mg, 650 mg, oral, q4h PRN, 650 mg at 01/15/25 2225 **OR** acetaminophen (Tylenol) oral liquid 650 mg, 650 mg, oral, q4h PRN **OR** acetaminophen (Tylenol) suppository 650 mg, 650 mg, rectal, q4h PRN, MG Hollis-C    albuterol 2.5 mg /3 mL (0.083 %) nebulizer solution 2.5 mg, 2.5 mg, nebulization, q6h PRN, MG Hollis-C, 2.5 mg at 01/14/25 1533    amiodarone (Pacerone) tablet 200 mg, 200 mg, oral, Daily with breakfast, MG Hollis-EDENILSON, 200 mg at 01/14/25 0832    amLODIPine (Norvasc) tablet 5 mg, 5 mg, oral, Daily, MG Hollis-C, 5 mg at 01/14/25 0832    aspirin EC tablet 81  mg, 81 mg, oral, Daily, Renea Fontana PA-C, 81 mg at 01/14/25 0832    atorvastatin (Lipitor) tablet 40 mg, 40 mg, oral, Nightly, Renea Fontana PA-C, 40 mg at 01/15/25 2117    benzocaine-menthol (Cepastat Sore Throat) lozenge 1 lozenge, 1 lozenge, Mouth/Throat, q2h PRN, Renea Fontana PA-C    bisacodyl (Dulcolax) EC tablet 10 mg, 10 mg, oral, Daily PRN, Renea Fontana PA-C    [Held by provider] clopidogrel (Plavix) tablet 75 mg, 75 mg, oral, Daily, Renea Fontana PA-C    dextromethorphan-guaifenesin (Robitussin DM)  mg/5 mL oral liquid 5 mL, 5 mL, oral, q4h PRN, Renea Fontana PA-C, 5 mL at 01/13/25 0451    dextrose 50 % injection 12.5 g, 12.5 g, intravenous, q15 min PRN, Renea Fontana PA-C, 12.5 g at 01/10/25 2000    dextrose 50 % injection 25 g, 25 g, intravenous, q15 min PRN, Renea Fontana PA-C    epoetin debra-epbx (Retacrit) injection 5,000 Units, 5,000 Units, subcutaneous, Once per day on Monday Wednesday Friday, Renea Fontana PA-C, 5,000 Units at 01/13/25 1818    glucagon (Glucagen) injection 1 mg, 1 mg, intramuscular, q15 min PRN, Renea Fontana PA-C    glucagon (Glucagen) injection 1 mg, 1 mg, intramuscular, q15 min PRN, Renea Fontana PA-C    guaiFENesin (Mucinex) 12 hr tablet 600 mg, 600 mg, oral, q12h PRN, Renea Fontana PA-C    heparin (porcine) injection 5,000 Units, 5,000 Units, subcutaneous, q8h [facility], 5,000 Units at 01/16/25 0619    heparin 1,000 unit/mL injection 2,000 Units, 2,000 Units, intra-catheter, After Dialysis, Renea Fontana PA-C, 1,200 Units at 01/13/25 1139    heparin 1,000 unit/mL injection 2,000 Units, 2,000 Units, intra-catheter, After Dialysis, Renea Fontana PA-C, 1,200 Units at 01/13/25 1139    insulin lispro injection 0-5 Units, 0-5 Units, subcutaneous, TID AC, Renea Fontana PA-C, 4 Units at 01/14/25 1710    ipratropium-albuteroL (Duo-Neb) 0.5-2.5 mg/3 mL nebulizer solution 3 mL, 3 mL, nebulization, 4x daily, Renea  EFRAIN Fontana PA-C, 3 mL at 01/16/25 0800    levothyroxine (Synthroid, Levoxyl) tablet 200 mcg, 200 mcg, oral, Daily, Renea Fontana PA-C, 200 mcg at 01/15/25 0543    levothyroxine (Synthroid, Levoxyl) tablet 50 mcg, 50 mcg, oral, Daily, Renea Fontana PA-C, 50 mcg at 01/15/25 0544    melatonin tablet 10 mg, 10 mg, oral, Nightly PRN, Renea Fontana PA-C, 10 mg at 01/14/25 2022    metoprolol succinate XL (Toprol-XL) 24 hr tablet 50 mg, 50 mg, oral, Daily, Renea Fontana PA-C, 50 mg at 01/14/25 0832    oxygen (O2) therapy (Peds), , inhalation, Continuous PRN - O2/gases, Renea Fontana PA-C    oxygen (O2) therapy, , inhalation, Continuous - Inhalation, Renea Fontana PA-C, 30 percent at 01/16/25 0806    pantoprazole (ProtoNix) injection 40 mg, 40 mg, intravenous, BID, Renea Fontana PA-C, 40 mg at 01/15/25 2117    polyethylene glycol (Glycolax, Miralax) packet 17 g, 17 g, oral, Daily PRN, Renea Fontana PA-C    prochlorperazine (Compazine) tablet 10 mg, 10 mg, oral, q6h PRN **OR** prochlorperazine (Compazine) injection 10 mg, 10 mg, intravenous, q6h PRN **OR** prochlorperazine (Compazine) suppository 25 mg, 25 mg, rectal, q12h PRN, Renea Fontana PA-C    sodium chloride 3 % nebulizer solution 3 mL, 3 mL, nebulization, 4x daily, Renea Fontana PA-C, 3 mL at 01/16/25 0800    venlafaxine XR (Effexor-XR) 24 hr capsule 150 mg, 150 mg, oral, Daily, Renea Fontana PA-C, 150 mg at 01/14/25 0832    Review of Systems:  14 point review of systems was completed and negative except for those specially mention in my HPI    Physical Exam:    Heart Rate:  []   Temp:  [36.5 °C (97.7 °F)-36.9 °C (98.4 °F)]   Resp:  [15-24]   BP: (108-147)/(58-78)   Weight:  [65.1 kg (143 lb 8.3 oz)]   SpO2:  [92 %-100 %]     Physical Exam  Constitutional:       Appearance: She is ill-appearing.   HENT:      Nose: Nose normal.      Mouth/Throat:      Mouth: Mucous membranes are dry.      Pharynx: Oropharynx is clear.   Eyes:       Conjunctiva/sclera: Conjunctivae normal.      Pupils: Pupils are equal, round, and reactive to light.   Cardiovascular:      Rate and Rhythm: Normal rate. Rhythm irregular.      Pulses: Normal pulses.      Heart sounds: Normal heart sounds.   Pulmonary:      Effort: Pulmonary effort is normal.      Comments: Diminished breath sounds bilaterally   Abdominal:      General: Abdomen is flat. Bowel sounds are normal.      Palpations: Abdomen is soft.   Skin:     General: Skin is warm and dry.   Neurological:      General: No focal deficit present.      Mental Status: She is alert and oriented to person, place, and time.   Psychiatric:         Mood and Affect: Mood normal.         Behavior: Behavior normal.         Objective:    I have reviewed all medications, laboratory results, and imaging pertinent for today's encounter.    FiO2 (%):  [30 %-32 %] 32 %  S RR:  [16] 16      Intake/Output Summary (Last 24 hours) at 1/16/2025 0835  Last data filed at 1/16/2025 0410  Gross per 24 hour   Intake 1649.77 ml   Output 2811 ml   Net -1161.23 ml       Recent Imaging  XR chest 1 view   Final Result   Slight worsening of the pulmonary edema with stable moderate-sized   right pleural effusion.        Signed by: Ting Mcgovern 1/16/2025 6:39 AM   Dictation workstation:   [id number]      XR chest 1 view   Final Result   Compared to 01/11/2025, there is improvement in pulmonary edema and   right pleural effusion but considerable residual edema remains and   residual moderate right effusion noted.             MACRO:   None.        Signed by: Erlin Malcolm 1/15/2025 6:56 PM   Dictation workstation:   [id number]      US guided percutaneous placement   Final Result   Ultrasound-guided vascular access into arterial and venous limb of   right upper extremity AV graft. Administration of thrombolytic into   the thrombosed graft.             Signed by: George Crane 1/13/2025 2:13 PM   Dictation workstation:   [id number]      XR chest 1  view   Final Result   Cardiomegaly with bilateral infiltrates and possible bilateral   effusions. Accounting for differences in patient positioning the   findings are thought to be similar compared to the study from   01/10/2025.        MACRO:   none        Signed by: Silver Vargas 1/11/2025 10:50 AM   Dictation workstation:   [id number]      XR chest 1 view   Final Result   Bilateral parenchymal infiltration. Probable right pleural effusion.        MACRO:   none        Signed by: Nuha Cheatham 1/10/2025 3:27 PM   Dictation workstation:   [id number]      CT head wo IV contrast   Final Result   No acute intracranial pathologic findings are identified.   Age-related intracranial findings are present, stable.   Abnormal appearance of the right eye for which clinical correlation   is recommended as above        MACRO:   none        Signed by: Silver Vargas 1/10/2025 2:07 PM   Dictation workstation:   [id number]      IR angiogram fistula graft declot    (Results Pending)   IR CVC tunneled    (Results Pending)       No echocardiogram results found for the past 14 days    Assessment/Plan:    I am currently managing this critically ill patient for the following problems:    Neuro/Psych/Pain Ctrl/Sedation:  Acute hypoxic/hypercapnic encephalopathy 2/2 to over utilization of sedation meds-resolved  Hx of depression, cva  Ct head 1/10: no acute intracranial pathology  - CAM ICU score q-shift  - Sleep/wake cycle hygiene  - Delirium precaution   - Pain Management: tylenol prn  - Q4 Neuro assessments  - Continue Effexor    Respiratory/ENT:  Acute on chronic hypercapnic, hypoxemic respiratory failure  COPD  Right sided pleural effusion  CXR 1/9: vascular congestion, bilateral pleural effusions  Admitted to ICU on Bipap 20/8 40%, wean as appropriate  R sided thoracentesis with 1L removal on 12/31  - Continuous Pulse oximetry  - Wean Fio2 to maintain Spo2>92%  - Bronchial hygiene and IS  - Duonebs q6, hypertonic saline q6  -  Currently on 3L NC (baseline home O2)  - Bipap at night    Cardiovascular:  Thrombosis of AF fistula  Hx of afib, hypertension, hyperlipid, HFpEF, vasculitis   Echo 12/24: EF of 55-60%, elevated left atrial pressure, severly increased septal and posterior left ventricular wall thickness, severely enlarge LV hypertrophy  Carotid artery duplex 12/24: Right and Left carotid with less than 50% occlusion, greater 50% stenosis in left subclavian artery  1/13 AV graft injected with TPA  1/15 Placement of tunneled HD catheter  - CXR: worsening pulmonary edema, right sided pleural effusion  - Dialysis vs thoracentesis, will discuss with nephrology  - Continuous Telemetry  - Systolic goals >90 and < 160  - Continue home amlodipine lipitor, metoprolol, amiodarone, asa  - Holding plavix    GI:  Hx of GERD  - Diet: Renal diet  - PPI: home protonix  - BR: miralax    Renal/Volume Status (Intra & Extravascular):  ESRD-T/TH/Sa  - Monitor I/O's, oliguric  - Replete electrolytes to maintain K >4.0 and Mg >2.0  - Daily BMP, Mg   - Nephrology following  - Last dialysis yesterday, removed 2.8 L  - Will discuss with nephrology today for possible dialysis to day for fluid removal     Endocrine  Hypothyroid  - POCT ACHS, SS#1  - Hgb A1C 8.1 (1/1/25)  - TSH 2.6  - Continue home synthroid    Infectious Disease:  Positive flu A on 12/31  No infectious disease concerns at this time  - Pleural fluid tap negative  - Daily CBC    Heme/Onc:  Chronic anemia  Hx of renal cell carcinoma s/p resection '21  - DVT Prophylaxis: SCDs and heparin  - Transfuse for hemoglobin < 7  - Continue epoetin injection with HD    MSK:  - PT/OT when appropriate     Ethics/Code Status:  DNR/DNI    :  DVT Prophylaxis: SCDs, SQH  GI Prophylaxis: PPI (home med)  Bowel Regimen: Miralx PRN  Diet: Renal Diet  CVC: NA  Glendo: AJITH  Fay: NA  Restraints: NA  Dispo: ICU    Critical Care Time:  50 minutes spent in preparing to see patient (I.e. review of medical  records), evaluation of diagnostics (I.e. labs, imaging, etc.), documentation, discussing plan of care with patient/ family/ caregiver, and/ or coordination of care with multidisciplinary team. Time does not include completion of procedure time.    Plan discussed with Dr. Diane Fontana PA-C

## 2025-01-17 ENCOUNTER — APPOINTMENT (OUTPATIENT)
Dept: DIALYSIS | Facility: HOSPITAL | Age: 70
End: 2025-01-17
Payer: MEDICARE

## 2025-01-17 ENCOUNTER — APPOINTMENT (OUTPATIENT)
Dept: RADIOLOGY | Facility: HOSPITAL | Age: 70
DRG: 252 | End: 2025-01-17
Payer: MEDICARE

## 2025-01-17 PROBLEM — N18.6 ESRD (END STAGE RENAL DISEASE) ON DIALYSIS (MULTI): Status: ACTIVE | Noted: 2025-01-17

## 2025-01-17 PROBLEM — Z99.2 ESRD (END STAGE RENAL DISEASE) ON DIALYSIS (MULTI): Status: ACTIVE | Noted: 2025-01-17

## 2025-01-17 LAB
ALBUMIN SERPL BCP-MCNC: 2.7 G/DL (ref 3.4–5)
ANION GAP BLDA CALCULATED.4IONS-SCNC: 6 MMO/L (ref 10–25)
ANION GAP SERPL CALCULATED.3IONS-SCNC: 12 MMOL/L (ref 10–20)
APPARATUS: ABNORMAL
ARTERIAL PATENCY WRIST A: POSITIVE
BASE EXCESS BLDA CALC-SCNC: 5.7 MMOL/L (ref -2–3)
BASOPHILS # BLD AUTO: 0.03 X10*3/UL (ref 0–0.1)
BASOPHILS NFR BLD AUTO: 0.3 %
BODY TEMPERATURE: 37 DEGREES CELSIUS
BUN SERPL-MCNC: 24 MG/DL (ref 6–23)
CA-I BLDA-SCNC: 1.17 MMOL/L (ref 1.1–1.33)
CALCIUM SERPL-MCNC: 8 MG/DL (ref 8.6–10.3)
CHLORIDE BLDA-SCNC: 99 MMOL/L (ref 98–107)
CHLORIDE SERPL-SCNC: 97 MMOL/L (ref 98–107)
CO2 SERPL-SCNC: 29 MMOL/L (ref 21–32)
CREAT SERPL-MCNC: 2.68 MG/DL (ref 0.5–1.05)
EGFRCR SERPLBLD CKD-EPI 2021: 19 ML/MIN/1.73M*2
EOSINOPHIL # BLD AUTO: 0.07 X10*3/UL (ref 0–0.7)
EOSINOPHIL NFR BLD AUTO: 0.7 %
ERYTHROCYTE [DISTWIDTH] IN BLOOD BY AUTOMATED COUNT: 13.6 % (ref 11.5–14.5)
FLOW: 28 LPM
GLUCOSE BLD MANUAL STRIP-MCNC: 177 MG/DL (ref 74–99)
GLUCOSE BLD MANUAL STRIP-MCNC: 181 MG/DL (ref 74–99)
GLUCOSE BLD MANUAL STRIP-MCNC: 182 MG/DL (ref 74–99)
GLUCOSE BLD MANUAL STRIP-MCNC: 239 MG/DL (ref 74–99)
GLUCOSE BLDA-MCNC: 117 MG/DL (ref 74–99)
GLUCOSE SERPL-MCNC: 116 MG/DL (ref 74–99)
HCO3 BLDA-SCNC: 31.1 MMOL/L (ref 22–26)
HCT VFR BLD AUTO: 28.5 % (ref 36–46)
HCT VFR BLD EST: 26 % (ref 36–46)
HGB BLD-MCNC: 8.9 G/DL (ref 12–16)
HGB BLDA-MCNC: 8.6 G/DL (ref 12–16)
IMM GRANULOCYTES # BLD AUTO: 0.13 X10*3/UL (ref 0–0.7)
IMM GRANULOCYTES NFR BLD AUTO: 1.3 % (ref 0–0.9)
INHALED O2 CONCENTRATION: 28 %
LACTATE BLDA-SCNC: 0.5 MMOL/L (ref 0.4–2)
LYMPHOCYTES # BLD AUTO: 0.89 X10*3/UL (ref 1.2–4.8)
LYMPHOCYTES NFR BLD AUTO: 8.9 %
MAGNESIUM SERPL-MCNC: 2.21 MG/DL (ref 1.6–2.4)
MCH RBC QN AUTO: 30.7 PG (ref 26–34)
MCHC RBC AUTO-ENTMCNC: 31.2 G/DL (ref 32–36)
MCV RBC AUTO: 98 FL (ref 80–100)
MONOCYTES # BLD AUTO: 0.59 X10*3/UL (ref 0.1–1)
MONOCYTES NFR BLD AUTO: 5.9 %
NEUTROPHILS # BLD AUTO: 8.24 X10*3/UL (ref 1.2–7.7)
NEUTROPHILS NFR BLD AUTO: 82.9 %
NRBC BLD-RTO: 0 /100 WBCS (ref 0–0)
OXYHGB MFR BLDA: 94.2 % (ref 94–98)
PCO2 BLDA: 49 MM HG (ref 38–42)
PH BLDA: 7.41 PH (ref 7.38–7.42)
PHOSPHATE SERPL-MCNC: 2.5 MG/DL (ref 2.5–4.9)
PLATELET # BLD AUTO: 152 X10*3/UL (ref 150–450)
PO2 BLDA: 68 MM HG (ref 85–95)
POTASSIUM BLDA-SCNC: 3.9 MMOL/L (ref 3.5–5.3)
POTASSIUM SERPL-SCNC: 3.7 MMOL/L (ref 3.5–5.3)
RBC # BLD AUTO: 2.9 X10*6/UL (ref 4–5.2)
SAO2 % BLDA: 95 % (ref 94–100)
SODIUM BLDA-SCNC: 132 MMOL/L (ref 136–145)
SODIUM SERPL-SCNC: 134 MMOL/L (ref 136–145)
SPECIMEN DRAWN FROM PATIENT: ABNORMAL
WBC # BLD AUTO: 10 X10*3/UL (ref 4.4–11.3)

## 2025-01-17 PROCEDURE — 99232 SBSQ HOSP IP/OBS MODERATE 35: CPT

## 2025-01-17 PROCEDURE — 2500000005 HC RX 250 GENERAL PHARMACY W/O HCPCS

## 2025-01-17 PROCEDURE — 2060000001 HC INTERMEDIATE ICU ROOM DAILY

## 2025-01-17 PROCEDURE — 84132 ASSAY OF SERUM POTASSIUM: CPT

## 2025-01-17 PROCEDURE — 2500000004 HC RX 250 GENERAL PHARMACY W/ HCPCS (ALT 636 FOR OP/ED)

## 2025-01-17 PROCEDURE — 2500000001 HC RX 250 WO HCPCS SELF ADMINISTERED DRUGS (ALT 637 FOR MEDICARE OP)

## 2025-01-17 PROCEDURE — 94640 AIRWAY INHALATION TREATMENT: CPT

## 2025-01-17 PROCEDURE — 83735 ASSAY OF MAGNESIUM: CPT

## 2025-01-17 PROCEDURE — 82947 ASSAY GLUCOSE BLOOD QUANT: CPT

## 2025-01-17 PROCEDURE — 2500000002 HC RX 250 W HCPCS SELF ADMINISTERED DRUGS (ALT 637 FOR MEDICARE OP, ALT 636 FOR OP/ED)

## 2025-01-17 PROCEDURE — 71045 X-RAY EXAM CHEST 1 VIEW: CPT | Performed by: RADIOLOGY

## 2025-01-17 PROCEDURE — 94668 MNPJ CHEST WALL SBSQ: CPT

## 2025-01-17 PROCEDURE — 6350000001 HC RX 635 EPOETIN >10,000 UNITS

## 2025-01-17 PROCEDURE — 85025 COMPLETE CBC W/AUTO DIFF WBC: CPT

## 2025-01-17 PROCEDURE — 8010000001 HC DIALYSIS - HEMODIALYSIS PER DAY

## 2025-01-17 PROCEDURE — 71045 X-RAY EXAM CHEST 1 VIEW: CPT

## 2025-01-17 PROCEDURE — 36600 WITHDRAWAL OF ARTERIAL BLOOD: CPT

## 2025-01-17 PROCEDURE — 2500000004 HC RX 250 GENERAL PHARMACY W/ HCPCS (ALT 636 FOR OP/ED): Performed by: INTERNAL MEDICINE

## 2025-01-17 PROCEDURE — 36415 COLL VENOUS BLD VENIPUNCTURE: CPT

## 2025-01-17 PROCEDURE — 9420000001 HC RT PATIENT EDUCATION 5 MIN

## 2025-01-17 RX ORDER — METOPROLOL TARTRATE 25 MG/1
25 TABLET, FILM COATED ORAL 2 TIMES DAILY
Status: DISCONTINUED | OUTPATIENT
Start: 2025-01-17 | End: 2025-01-21 | Stop reason: HOSPADM

## 2025-01-17 RX ORDER — HEPARIN SODIUM 1000 [USP'U]/ML
2000 INJECTION, SOLUTION INTRAVENOUS; SUBCUTANEOUS
Status: DISCONTINUED | OUTPATIENT
Start: 2025-01-17 | End: 2025-01-21 | Stop reason: HOSPADM

## 2025-01-17 RX ORDER — HEPARIN SODIUM 1000 [USP'U]/ML
2000 INJECTION, SOLUTION INTRAVENOUS; SUBCUTANEOUS
Status: DISCONTINUED | OUTPATIENT
Start: 2025-01-17 | End: 2025-01-17

## 2025-01-17 RX ADMIN — Medication 3 ML: at 11:44

## 2025-01-17 RX ADMIN — Medication 3 ML: at 07:27

## 2025-01-17 RX ADMIN — IPRATROPIUM BROMIDE AND ALBUTEROL SULFATE 3 ML: 2.5; .5 SOLUTION RESPIRATORY (INHALATION) at 20:01

## 2025-01-17 RX ADMIN — HEPARIN SODIUM 1900 UNITS: 1000 INJECTION, SOLUTION INTRAVENOUS; SUBCUTANEOUS at 17:20

## 2025-01-17 RX ADMIN — IPRATROPIUM BROMIDE AND ALBUTEROL SULFATE 3 ML: 2.5; .5 SOLUTION RESPIRATORY (INHALATION) at 07:27

## 2025-01-17 RX ADMIN — VENLAFAXINE HYDROCHLORIDE 150 MG: 150 CAPSULE, EXTENDED RELEASE ORAL at 08:51

## 2025-01-17 RX ADMIN — INSULIN LISPRO 1 UNITS: 100 INJECTION, SOLUTION INTRAVENOUS; SUBCUTANEOUS at 17:55

## 2025-01-17 RX ADMIN — LEVOTHYROXINE SODIUM 50 MCG: 0.05 TABLET ORAL at 05:42

## 2025-01-17 RX ADMIN — EPOETIN ALFA-EPBX 5000 UNITS: 20000 INJECTION, SOLUTION INTRAVENOUS; SUBCUTANEOUS at 17:57

## 2025-01-17 RX ADMIN — ASPIRIN 81 MG: 81 TABLET, COATED ORAL at 08:51

## 2025-01-17 RX ADMIN — INSULIN LISPRO 1 UNITS: 100 INJECTION, SOLUTION INTRAVENOUS; SUBCUTANEOUS at 08:51

## 2025-01-17 RX ADMIN — METOPROLOL TARTRATE 25 MG: 25 TABLET, FILM COATED ORAL at 12:56

## 2025-01-17 RX ADMIN — Medication 3 ML: at 20:01

## 2025-01-17 RX ADMIN — METOPROLOL TARTRATE 25 MG: 25 TABLET, FILM COATED ORAL at 21:17

## 2025-01-17 RX ADMIN — Medication 3 L/MIN: at 07:27

## 2025-01-17 RX ADMIN — Medication 10 MG: at 21:30

## 2025-01-17 RX ADMIN — LEVOTHYROXINE SODIUM 200 MCG: 0.1 TABLET ORAL at 05:41

## 2025-01-17 RX ADMIN — ACETAMINOPHEN 650 MG: 325 TABLET ORAL at 21:30

## 2025-01-17 RX ADMIN — ATORVASTATIN CALCIUM 40 MG: 40 TABLET, FILM COATED ORAL at 21:17

## 2025-01-17 RX ADMIN — PANTOPRAZOLE SODIUM 40 MG: 40 INJECTION, POWDER, FOR SOLUTION INTRAVENOUS at 21:17

## 2025-01-17 RX ADMIN — Medication 32 PERCENT: at 20:01

## 2025-01-17 RX ADMIN — HEPARIN SODIUM 5000 UNITS: 5000 INJECTION, SOLUTION INTRAVENOUS; SUBCUTANEOUS at 17:56

## 2025-01-17 RX ADMIN — INSULIN LISPRO 1 UNITS: 100 INJECTION, SOLUTION INTRAVENOUS; SUBCUTANEOUS at 11:58

## 2025-01-17 RX ADMIN — IPRATROPIUM BROMIDE AND ALBUTEROL SULFATE 3 ML: 2.5; .5 SOLUTION RESPIRATORY (INHALATION) at 11:44

## 2025-01-17 RX ADMIN — PANTOPRAZOLE SODIUM 40 MG: 40 INJECTION, POWDER, FOR SOLUTION INTRAVENOUS at 08:52

## 2025-01-17 RX ADMIN — HEPARIN SODIUM 5000 UNITS: 5000 INJECTION, SOLUTION INTRAVENOUS; SUBCUTANEOUS at 05:42

## 2025-01-17 RX ADMIN — AMIODARONE HYDROCHLORIDE 200 MG: 200 TABLET ORAL at 08:52

## 2025-01-17 ASSESSMENT — PAIN - FUNCTIONAL ASSESSMENT
PAIN_FUNCTIONAL_ASSESSMENT: 0-10
PAIN_FUNCTIONAL_ASSESSMENT: CPOT (CRITICAL CARE PAIN OBSERVATION TOOL)
PAIN_FUNCTIONAL_ASSESSMENT: 0-10
PAIN_FUNCTIONAL_ASSESSMENT: 0-10

## 2025-01-17 ASSESSMENT — PAIN DESCRIPTION - DESCRIPTORS: DESCRIPTORS: ACHING

## 2025-01-17 ASSESSMENT — PAIN DESCRIPTION - LOCATION: LOCATION: HEAD

## 2025-01-17 NOTE — POST-PROCEDURE NOTE
Report to Receiving RN:     Report To: BEATRICE Dixon  Time Report Called: [id number]  Hand-Off Communication: HD ended 10min early d/t machine clotting. 1.5L removed. Last BP 96/65 . Catheter ports heparinized with 1.9cc each port, capped securely with red curos caps.  Complications During Treatment: Asymptomatic hypotension  Ultrafiltration Treatment: No  Medications Administered During Dialysis: No  Blood Products Administered During Dialysis: No  Labs Sent During Dialysis: No  Heparin Drip Rate Changes: No  Dialysis Catheter Dressing: clean, dry and intact  Last Dressing Change: 1/15/25     Electronic Signatures:  Ramila COLET

## 2025-01-17 NOTE — PROGRESS NOTES
Occupational Therapy                 Therapy Communication Note    Patient Name: Daphne Morris  MRN: [id number]  Department: [facility] DIALYSIS  Room: 13/13-A  Today's Date: 1/17/2025     Discipline: Occupational Therapy          Missed Visit Reason: Missed Visit Reason: Other (Comment) (receiving HD)  Missed Time: Cancel    Comment:

## 2025-01-17 NOTE — CARE PLAN
The patient's goals for the shift include  rest    The clinical goals for the shift include pt to remain hemodynamically stable      Problem: Skin  Goal: Decreased wound size/increased tissue granulation at next dressing change  Outcome: Progressing  Flowsheets (Taken 1/17/2025 0043)  Decreased wound size/increased tissue granulation at next dressing change:   Promote sleep for wound healing   Protective dressings over bony prominences  Goal: Participates in plan/prevention/treatment measures  Outcome: Progressing  Flowsheets (Taken 1/17/2025 0043)  Participates in plan/prevention/treatment measures:   Discuss with provider PT/OT consult   Elevate heels   Increase activity/out of bed for meals  Goal: Prevent/manage excess moisture  Outcome: Progressing  Flowsheets (Taken 1/17/2025 0043)  Prevent/manage excess moisture:   Cleanse incontinence/protect with barrier cream   Moisturize dry skin   Follow provider orders for dressing changes  Goal: Prevent/minimize sheer/friction injuries  Outcome: Progressing  Flowsheets (Taken 1/17/2025 0043)  Prevent/minimize sheer/friction injuries:   Increase activity/out of bed for meals   Turn/reposition every 2 hours/use positioning/transfer devices   Use pull sheet   Complete micro-shifts as needed if patient unable. Adjust patient position to relieve pressure points, not a full turn  Goal: Promote/optimize nutrition  Outcome: Progressing  Flowsheets (Taken 1/17/2025 0043)  Promote/optimize nutrition:   Assist with feeding   Consume > 50% meals/supplements   Monitor/record intake including meals  Goal: Promote skin healing  Outcome: Progressing  Flowsheets (Taken 1/17/2025 0043)  Promote skin healing:   Assess skin/pad under line(s)/device(s)   Protective dressings over bony prominences   Rotate device position/do not position patient on device   Turn/reposition every 2 hours/use positioning/transfer devices   Ensure correct size (line/device) and apply per   instructions     Problem: Fall/Injury  Goal: Not fall by end of shift  Outcome: Progressing  Goal: Be free from injury by end of the shift  Outcome: Progressing  Goal: Verbalize understanding of personal risk factors for fall in the hospital  Outcome: Progressing  Goal: Verbalize understanding of risk factor reduction measures to prevent injury from fall in the home  Outcome: Progressing  Goal: Use assistive devices by end of the shift  Outcome: Progressing  Goal: Pace activities to prevent fatigue by end of the shift  Outcome: Progressing     Problem: Nutrition  Goal: Oral intake greater 75%  Outcome: Progressing  Goal: Consume prescribed supplement  Outcome: Progressing  Goal: Promote healing  Outcome: Progressing     Problem: Dialysis  Goal: I will slow progression of end-stage renal disease through participating in dialysis 3 times a week  Outcome: Progressing     Problem: Respiratory  Goal: Minimal/no exertional discomfort or dyspnea this shift  Outcome: Progressing  Goal: No signs of respiratory distress (eg. Use of accessory muscles. Peds grunting)  Outcome: Progressing  Goal: Patent airway maintained this shift  Outcome: Progressing  Goal: Verbalize decreased shortness of breath this shift  Outcome: Progressing  Goal: Wean oxygen to maintain O2 saturation per order/standard this shift  Outcome: Progressing     Problem: Bathing  Goal: STG - Patient will bathe upper body with minimal assist  Outcome: Progressing     Problem: Dressing Upper Extremities  Goal: LTG - Patient will complete upper body dressing with set up  Outcome: Progressing     Problem: Grooming  Goal: STG - Patient completes grooming with set up  Outcome: Progressing     Problem: Toileting  Goal: STG - Patient will complete toileting tasks with 2ww and minimal assist  Outcome: Progressing     Problem: Pain - Adult  Goal: Verbalizes/displays adequate comfort level or baseline comfort level  Outcome: Progressing     Problem: Safety - Adult  Goal:  Free from fall injury  Outcome: Progressing     Problem: Discharge Planning  Goal: Discharge to home or other facility with appropriate resources  Outcome: Progressing     Problem: Chronic Conditions and Co-morbidities  Goal: Patient's chronic conditions and co-morbidity symptoms are monitored and maintained or improved  Outcome: Progressing     Problem: Diabetes  Goal: Achieve decreasing blood glucose levels by end of shift  Outcome: Progressing  Goal: Increase stability of blood glucose readings by end of shift  Outcome: Progressing  Goal: Decrease in ketones present in urine by end of shift  Outcome: Progressing  Goal: Maintain electrolyte levels within acceptable range throughout shift  Outcome: Progressing  Goal: Maintain glucose levels >70mg/dl to <250mg/dl throughout shift  Outcome: Progressing  Goal: No changes in neurological exam by end of shift  Outcome: Progressing  Goal: Learn about and adhere to nutrition recommendations by end of shift  Outcome: Progressing  Goal: Vital signs within normal range for age by end of shift  Outcome: Progressing  Goal: Increase self care and/or family involovement by end of shift  Outcome: Progressing  Goal: Receive DSME education by end of shift  Outcome: Progressing

## 2025-01-17 NOTE — PROGRESS NOTES
Patient not medically clear. Patient to transfer out of ICU. The discharge plan remains to go to [facility]. Precert is approved through 1/21. Will follow.     **DO NOT DISCHARGE PATIENT WITHOUT SPEAKING TO CASE MANAGEMENT     01/17/25 7673   Discharge Planning   Home or Post Acute Services Post acute facilities (Rehab/SNF/etc)   Type of Post Acute Facility Services Rehab;Skilled nursing   Expected Discharge Disposition SNF  ([facility])   Does the patient need discharge transport arranged? Yes   RoundTrip coordination needed? Yes

## 2025-01-17 NOTE — PRE-PROCEDURE NOTE
Report from Sending RN:    Report From: BEATRICE Dixon  Recent Surgery of Procedure: No  Baseline Level of Consciousness (LOC): A&Ox4  Oxygen Use: Yes, 3L  Type: n/c  Diabetic: Yes  Last BP Med Given Day of Dialysis: see EMAR  Last Pain Med Given: see EMAR  Lab Tests to be Obtained with Dialysis: No  Blood Transfusion to be Given During Dialysis: No  Available IV Access: Yes  Medications to be Administered During Dialysis: No  Continuous IV Infusion Running: No  Restraints on Currently or in the Last 24 Hours: No  Hand-Off Communication: can come to HD room with downgraded status in ICU, stable and ready for HD  Dialysis Catheter Dressing: will assess when patient arrives  Last Dressing Change: will assess when patient arrives

## 2025-01-17 NOTE — PROGRESS NOTES
[facility] Critical Care Medicine       Date:  1/17/2025  Patient:  Daphne Morris  YOB: 1955  MRN:  [id number]   Admit Date:  1/10/2025    No chief complaint on file.        History of Present Illness:  Daphne Morris is a 69 year old female patient with pmhx of ESRD, anemia, afib, chf, copd, CVA, depression dm, chronic hypoxic respiratory failure on 3L O2 at home, gerd hyperlipidemia, hypertension, hypothyroid, renal cell carcinoma s/p resection in 8/21 and vascultitis. Patient was admitted 1/9 at [facility] for clotted AV fistula and transferred to [facility], [place], operated by [facility] to be seen by vascular surgery. Plan was for patitent to be taken to IR for thrombectomy and placement of dialysis line. Patient on arrival was lethargic and not responding.      Was brought to ICU on 1/10 for hypercapnic hypoxic respiratory failure and altered mental status. Was placed on Bipap for correction of altered mental status. HD line placed at bedside with emergent dialysis performed that night with 2L removal. Patient was taken off bipap on 1/11 with HD and 2L removed. Patient downgraded to stepdown status.     1/13 patient went to IR for administration of TPA in AV graft.     1/15 Patient to IR for placement of tunneled central hemodialysis catheter. Patient administered 50mcg of fentanyl and 1mg ativan. On return to floor patient difficult to arrouse, abg obtained showing hypoxic/hypercapnic respiratory failure. Placed on bipap and transferred to ICU.      Interval ICU Events:  1/15: Arrived to ICU on BIPAP hemodynamically stable. AO x 3, repeat ABG pending    1/16: Patient's mentation improved throughout the night. Utilized BiPAP OVN intermittently. CXR this am: Slight worsening of the pulmonary edema with stable moderate-sized right pleural effusion. Will discuss with nephrology dialysis vs thoracentesis.     1/17: Received HD ultrafiltration yesterday, removed 1.5 L. CXR unchanged from yesterday. Patient is asymptomatic and on home  O2, will defer thoracentesis at this time. Plan for HD today. Wore BiPAP intermittently OVN. Plan to downgrade.    Medical History:  Past Medical History:   Diagnosis Date    Anal fissure 10/12/2023    Anemia     ASHD (arteriosclerotic heart disease)     At risk for falls     Atrial fibrillation (Multi)     CHF (congestive heart failure)     CKD (chronic kidney disease)     COPD (chronic obstructive pulmonary disease) (Multi)     CVA (cerebral vascular accident) (Multi)     - right-sided weakness    Depression     Diabetes mellitus (Multi)     GERD (gastroesophageal reflux disease)     H/O pleural effusion     Hyperlipidemia     Hypertension     Hypothyroidism     Hypoxia     Impacted cerumen, left ear     Impacted cerumen of left ear    Noncompliance     Osteoarthritis     Perianal dermatitis 10/12/2023    Personal history of other diseases of the respiratory system     History of acute bronchitis    PVD (peripheral vascular disease) (CMS-HCC)     Renal carcinoma, right (Multi)     s/p partial nephrectomy 2021    Respiratory failure (Multi)     TIA (transient ischemic attack)     Vasculitis (CMS-HCC) 10/12/2023    Weakness      Past Surgical History:   Procedure Laterality Date    ANKLE SURGERY          CARDIAC CATHETERIZATION N/A 2024    Procedure: Right Heart Cath;  Surgeon: Nicholas Antonio MD;  Location: [facility];  Service: Cardiovascular;  Laterality: N/A;    CATARACT EXTRACTION Right     2019     SECTION, CLASSIC      MR CHEST ANGIO W AND WO IV CONTRAST  2023    MR CHEST ANGIO W AND WO IV CONTRAST 2023 [facility] MRI    MR HEAD ANGIO WO IV CONTRAST  2021    MR HEAD ANGIO WO IV CONTRAST [facility]    NEPHRECTOMY  2021    SHOULDER SURGERY      2009     Medications Prior to Admission   Medication Sig Dispense Refill Last Dose/Taking    acetaminophen (Tylenol) 500 mg tablet Take 2 tablets (1,000 mg) by mouth 2 times a day.       albuterol  "90 mcg/actuation inhaler INHALE 2 PUFFS BY MOUTH EVERY 4 HOURS AS NEEDED 8.5 g 0     amiodarone (Pacerone) 200 mg tablet TAKE 1 TABLET BY MOUTH ONCE DAILY WITH BREAKFAST. 90 tablet 0     amLODIPine (Norvasc) 5 mg tablet Take 1 tablet (5 mg) by mouth once daily.       aspirin 81 mg EC tablet Take 1 tablet (81 mg) by mouth once daily.       atorvastatin (Lipitor) 40 mg tablet TAKE ONE TABLET BY MOUTH EVERY DAY 90 tablet 3     BD Calista 2nd Gen Pen Needle 32 gauge x 5/32\" needle USE SUBCUTANEOUSLY AS DIRECTED TWICE DAILY 200 each 2     clopidogrel (Plavix) 75 mg tablet Take 1 tablet (75 mg) by mouth once daily.       ferrous gluconate 324 (38 Fe) mg tablet Take 1 tablet (324 mg) by mouth once daily with breakfast. 30 tablet 3     FreeStyle Shakir 14 Day Sensor kit USE AS DIRECTED TO CHECK SUGARS 3 TO 4 TIMES DAILY 1 each 11     FreeStyle Shakir 2 [place] misc Use as instructed 1 each 0     FreeStyle Shakir 3 [place] misc Use as instructed 1 each 0     FreeStyle Shakir 3 Sensor device Change sensor Q 14 days 2 each 11     HumaLOG KwikPen Insulin 100 unit/mL injection up to 15 units Subcutaneous three times a day per sliding scaleup to 15 units Subcutaneous three times a day per sliding scale (Patient taking differently: up to 15 units Subcutaneous three times a day per sliding scale.      151-200 2 units  201-250 4 units  251-300 6 units  301-350 8 units  351-400 10 units) 5 each 3     ipratropium-albuteroL (Duo-Neb) 0.5-2.5 mg/3 mL nebulizer solution INHALE THE CONTENTS OF 1 VIAL VIA NEBULIZER EVERY 6 HOURS AS NEEDED. 360 mL 0     levothyroxine (Synthroid, Levoxyl) 200 mcg tablet Take 1 tablet by mouth once daily in the morning before a meal. 90 tablet 3     levothyroxine (Synthroid, Levoxyl) 50 mcg tablet Take 1 tablet (50 mcg) by mouth once daily in the morning. Take before meals. 90 tablet 3     metoprolol succinate XL (Toprol-XL) 50 mg 24 hr tablet TAKE ONE TABLET BY MOUTH TWO TIMES A  tablet 0     oxygen " DME/Hospice (O2) therapy Inhale 3 L/min once daily at bedtime. Indications: decreased oxygen in the tissues or blood       pantoprazole (ProtoNix) 40 mg EC tablet TAKE ONE TABLET BY MOUTH TWO TIMES A DAY 60 tablet 0     venlafaxine XR (Effexor-XR) 75 mg 24 hr capsule Take 2 capsules (150 mg) by mouth once daily. 180 capsule 3      Bee venom protein (honey bee); Citalopram; Codeine; Influenza virus vaccines; Latex; Latex, natural rubber; Lisinopril; Penicillins; Adhesive tape-silicones; Amoxicillin; Doxycycline; Oseltamivir; Phenyleph-min oil-petrolatum; Phenyleph-shark oil-glyc-pet; Phenylephrine; Prednisone; Tuberculin ppd; and Xylometazoline  Social History     Tobacco Use    Smoking status: Every Day     Current packs/day: 0.50     Average packs/day: 0.5 packs/day for 56.0 years (28.0 ttl pk-yrs)     Types: Cigarettes     Start date: 1/1/1969     Passive exposure: Never    Smokeless tobacco: Never   Vaping Use    Vaping status: Never Used   Substance Use Topics    Alcohol use: Not Currently    Drug use: Not Currently     Types: Marijuana     Family History   Problem Relation Name Age of Onset    Hypertension Mother      Diabetes Father      Heart disease Father      Cancer Sister      Cancer Brother      Diabetes Daughter      Asthma Daughter      Heart attack Daughter      Diabetes Maternal Grandfather      Heart disease Paternal Grandmother      Diabetes Other      Hypertension Other      COPD Other      Other (chronic lung disease) Other         Hospital Medications:           Current Facility-Administered Medications:     acetaminophen (Tylenol) tablet 650 mg, 650 mg, oral, q4h PRN, 650 mg at 01/15/25 2412 **OR** acetaminophen (Tylenol) oral liquid 650 mg, 650 mg, oral, q4h PRN **OR** acetaminophen (Tylenol) suppository 650 mg, 650 mg, rectal, q4h PRN, Renea Fontana PA-C    albuterol 2.5 mg /3 mL (0.083 %) nebulizer solution 2.5 mg, 2.5 mg, nebulization, q6h PRN, Renea Fontana PA-C, 2.5 mg at 01/14/25  1533    amiodarone (Pacerone) tablet 200 mg, 200 mg, oral, Daily with breakfast, Renea Fontana PA-C, 200 mg at 01/16/25 0844    [Held by provider] amLODIPine (Norvasc) tablet 5 mg, 5 mg, oral, Daily, Renea Fontana PA-C, 5 mg at 01/16/25 0845    aspirin EC tablet 81 mg, 81 mg, oral, Daily, MG Hollis-EDENILSON, 81 mg at 01/16/25 0844    atorvastatin (Lipitor) tablet 40 mg, 40 mg, oral, Nightly, Renea Fontana PA-C, 40 mg at 01/16/25 2021    benzocaine-menthol (Cepastat Sore Throat) lozenge 1 lozenge, 1 lozenge, Mouth/Throat, q2h PRN, Renea Fontana PA-C    bisacodyl (Dulcolax) EC tablet 10 mg, 10 mg, oral, Daily PRN, Renea Fontana PA-C    [Held by provider] clopidogrel (Plavix) tablet 75 mg, 75 mg, oral, Daily, Renea Fontana PA-C    dextromethorphan-guaifenesin (Robitussin DM)  mg/5 mL oral liquid 5 mL, 5 mL, oral, q4h PRN, Renea Fontana PA-C, 5 mL at 01/13/25 0451    dextrose 50 % injection 12.5 g, 12.5 g, intravenous, q15 min PRN, Renea Fontana PA-C, 12.5 g at 01/10/25 2000    dextrose 50 % injection 25 g, 25 g, intravenous, q15 min PRN, Renea Fontana PA-C    epoetin derba-epbx (Retacrit) injection 5,000 Units, 5,000 Units, subcutaneous, Every Mon/Wed/Fri, Renea Fontana PA-C    glucagon (Glucagen) injection 1 mg, 1 mg, intramuscular, q15 min PRN, Renea Fontana PA-C    glucagon (Glucagen) injection 1 mg, 1 mg, intramuscular, q15 min PRN, Renea Fontana PA-C    guaiFENesin (Mucinex) 12 hr tablet 600 mg, 600 mg, oral, q12h PRN, Renea Fontana PA-C    heparin (porcine) injection 5,000 Units, 5,000 Units, subcutaneous, q8h SUMIT, Tyrone Ramesh, APRN-CNP, 5,000 Units at 01/17/25 0542    heparin 1,000 unit/mL injection 2,000 Units, 2,000 Units, intra-catheter, After Dialysis, Joseph Mitchell MD    heparin 1,000 unit/mL injection 2,000 Units, 2,000 Units, intra-catheter, After Dialysis, Joseph Mitchell MD, 2,000 Units at 01/16/25 1612    insulin lispro injection 0-5 Units,  0-5 Units, subcutaneous, TID AC, Renea Fontana PA-C, 1 Units at 01/16/25 1704    ipratropium-albuteroL (Duo-Neb) 0.5-2.5 mg/3 mL nebulizer solution 3 mL, 3 mL, nebulization, 4x daily, Renea Fontana PA-C, 3 mL at 01/17/25 0727    levothyroxine (Synthroid, Levoxyl) tablet 200 mcg, 200 mcg, oral, Daily, Renea Fontana PA-C, 200 mcg at 01/17/25 0541    levothyroxine (Synthroid, Levoxyl) tablet 50 mcg, 50 mcg, oral, Daily, Renea Fontana PA-C, 50 mcg at 01/17/25 0542    melatonin tablet 10 mg, 10 mg, oral, Nightly PRN, Renea Fontana PA-C, 10 mg at 01/14/25 2022    [Held by provider] metoprolol succinate XL (Toprol-XL) 24 hr tablet 50 mg, 50 mg, oral, Daily, Renea Fontana PA-C, 50 mg at 01/16/25 0844    oxygen (O2) therapy (Peds), , inhalation, Continuous PRN - O2/gases, Renea Fontana PA-C    oxygen (O2) therapy, , inhalation, Continuous - Inhalation, Renea Fontana PA-C, 3 L/min at 01/17/25 0727    pantoprazole (ProtoNix) injection 40 mg, 40 mg, intravenous, BID, eRnea Fontana PA-C, 40 mg at 01/16/25 2021    polyethylene glycol (Glycolax, Miralax) packet 17 g, 17 g, oral, Daily PRN, Renea Fontana PA-C    prochlorperazine (Compazine) tablet 10 mg, 10 mg, oral, q6h PRN **OR** prochlorperazine (Compazine) injection 10 mg, 10 mg, intravenous, q6h PRN **OR** prochlorperazine (Compazine) suppository 25 mg, 25 mg, rectal, q12h PRN, Renea M Fontana, PA-C    sodium chloride 3 % nebulizer solution 3 mL, 3 mL, nebulization, 4x daily, Renea Fontana PA-C, 3 mL at 01/17/25 0727    venlafaxine XR (Effexor-XR) 24 hr capsule 150 mg, 150 mg, oral, Daily, Renea Fontana PA-C, 150 mg at 01/16/25 0844    Review of Systems:  14 point review of systems was completed and negative except for those specially mention in my HPI    Physical Exam:    Heart Rate:  []   Temp:  [36.5 °C (97.7 °F)-36.9 °C (98.4 °F)]   Resp:  [12-28]   BP: ()/(54-78)   Weight:  [65 kg (143 lb 4.8 oz)]   SpO2:  [92 %-100 %]      Physical Exam  Constitutional:       Appearance: She is ill-appearing.   HENT:      Nose: Nose normal.      Mouth/Throat:      Mouth: Mucous membranes are dry.      Pharynx: Oropharynx is clear.   Eyes:      Conjunctiva/sclera: Conjunctivae normal.      Pupils: Pupils are equal, round, and reactive to light.   Cardiovascular:      Rate and Rhythm: Normal rate. Rhythm irregular.      Pulses: Normal pulses.      Heart sounds: Normal heart sounds.   Pulmonary:      Effort: Pulmonary effort is normal.      Comments: Diminished breath sounds bilaterally   Abdominal:      General: Abdomen is flat. Bowel sounds are normal.      Palpations: Abdomen is soft.   Skin:     General: Skin is warm and dry.   Neurological:      General: No focal deficit present.      Mental Status: She is alert and oriented to person, place, and time.   Psychiatric:         Mood and Affect: Mood normal.         Behavior: Behavior normal.       Objective:    I have reviewed all medications, laboratory results, and imaging pertinent for today's encounter.    FiO2 (%):  [30 %-32 %] 32 %  S RR:  [16] 16      Intake/Output Summary (Last 24 hours) at 1/17/2025 0815  Last data filed at 1/16/2025 1600  Gross per 24 hour   Intake 1650 ml   Output 2100 ml   Net -450 ml       Recent Imaging  XR chest 1 view   Final Result   Bilateral infiltrates with right effusion and postsurgical findings   as above. There is no interval change when compared to the previous   examination.        MACRO:   none        Signed by: Silver Vargas 1/17/2025 7:55 AM   Dictation workstation:   [id number]      XR chest 1 view   Final Result   Slight worsening of the pulmonary edema with stable moderate-sized   right pleural effusion.        Signed by: Ting Mcgovern 1/16/2025 6:39 AM   Dictation workstation:   [id number]      XR chest 1 view   Final Result   Compared to 01/11/2025, there is improvement in pulmonary edema and   right pleural effusion but considerable residual  edema remains and   residual moderate right effusion noted.             MACRO:   None.        Signed by: Erlin Malcolm 1/15/2025 6:56 PM   Dictation workstation:   [id number]      IR CVC tunneled   Final Result   Ultrasound and fluoroscopy guided insertion of tunneled central   venous catheter for hemodialysis via right internal jugular vein.   Indwelling non tunneled central venous catheter was removed.             Signed by: George Crane 1/16/2025 3:16 PM   Dictation workstation:   [id number]      US guided percutaneous placement   Final Result   Ultrasound-guided vascular access into arterial and venous limb of   right upper extremity AV graft. Administration of thrombolytic into   the thrombosed graft.             Signed by: George Crane 1/13/2025 2:13 PM   Dictation workstation:   [id number]      XR chest 1 view   Final Result   Cardiomegaly with bilateral infiltrates and possible bilateral   effusions. Accounting for differences in patient positioning the   findings are thought to be similar compared to the study from   01/10/2025.        MACRO:   none        Signed by: Silver Vargas 1/11/2025 10:50 AM   Dictation workstation:   [id number]      XR chest 1 view   Final Result   Bilateral parenchymal infiltration. Probable right pleural effusion.        MACRO:   none        Signed by: Nuha Cheatham 1/10/2025 3:27 PM   Dictation workstation:   [id number]      CT head wo IV contrast   Final Result   No acute intracranial pathologic findings are identified.   Age-related intracranial findings are present, stable.   Abnormal appearance of the right eye for which clinical correlation   is recommended as above        MACRO:   none        Signed by: iSlver Vargas 1/10/2025 2:07 PM   Dictation workstation:   [id number]      IR angiogram fistula graft declot    (Results Pending)       No echocardiogram results found for the past 14 days    Assessment/Plan:    I am currently managing this critically ill  patient for the following problems:    Neuro/Psych/Pain Ctrl/Sedation:  Acute hypoxic/hypercapnic encephalopathy 2/2 to over utilization of sedation meds-resolved  Hx of depression, cva  Ct head 1/10: no acute intracranial pathology  - CAM ICU score q-shift  - Sleep/wake cycle hygiene  - Delirium precaution   - Pain Management: tylenol prn  - Q4 Neuro assessments  - Continue Effexor    Respiratory/ENT:  Acute on chronic hypercapnic, hypoxemic respiratory failure  COPD  Right sided pleural effusion  CXR 1/17: Bilateral infiltrates with right effusion and postsurgical findings as above. There is no interval change when compared to the previous examination  Admitted to ICU on Bipap 20/8 40%, wean as appropriate  R sided thoracentesis with 1L removal on 12/31  - Duonebs q6, hypertonic saline q6  - Currently on 3L NC (baseline home O2)  - Bipap at night  - Defer thoracentesis at this time d/t improvement in ABG, on home O2 and asymptomatic  - Maintain Spo2>92%  - Bronchial hygiene and IS    Cardiovascular:  Thrombosis of AF fistula  Hx of afib, hypertension, hyperlipid, HFpEF, vasculitis   Echo 12/24: EF of 55-60%, elevated left atrial pressure, severly increased septal and posterior left ventricular wall thickness, severely enlarge LV hypertrophy  Carotid artery duplex 12/24: Right and Left carotid with less than 50% occlusion, greater 50% stenosis in left subclavian artery  1/13 AV graft injected with TPA  1/15 Placement of tunneled HD catheter  - CXR: worsening pulmonary edema, right sided pleural effusion  - Received HD (ultrafiltration), removed 1.5 L on 1/16  - Continuous Telemetry  - Systolic goals >90 and < 160  - Continue home lipitor, amiodarone, asa  - Restart home Metoprolol with hold parameters (split dose Metop 25 mg BID)  - Held home amlodipine d/t borderline normotension  - Holding plavix    GI:  Hx of GERD  - Diet: Renal diet  - PPI: home protonix  - BR: miralax    Renal/Volume Status (Intra &  Extravascular):  ESRD-T/TH/Sa  - Nephrology following  - Plan for dialysis today  - Received ultrafiltration, removed 1.5 L     Endocrine  Hypothyroid  - POCT ACHS, SS#1  - Hgb A1C 8.1 (1/1/25)  - TSH 2.6  - Continue home synthroid    Infectious Disease:  Positive flu A on 12/31  No infectious disease concerns at this time  - Pleural fluid tap negative  - Daily CBC    Heme/Onc:  Chronic anemia  Hx of renal cell carcinoma s/p resection '21  - DVT Prophylaxis: SCDs and heparin  - Transfuse for hemoglobin < 7  - Continue epoetin injection with HD    MSK:  - PT/OT following    Ethics/Code Status:  DNR/DNI    :  DVT Prophylaxis: SCDs, SQH  GI Prophylaxis: PPI (home med)  Bowel Regimen: Miralx PRN  Diet: Renal Diet  CVC: NA  [place]: NA  [place]: NA  Restraints: NA  Dispo: Downgrade. Signed out to Dr. Vogt at 1 pm, transfer to step down.    Time:  50 minutes spent in preparing to see patient (I.e. review of medical records), evaluation of diagnostics (I.e. labs, imaging, etc.), documentation, discussing plan of care with patient/ family/ caregiver, and/ or coordination of care with multidisciplinary team. Time does not include completion of procedure time.    Plan discussed with Dr. Diane Fontana PA-C

## 2025-01-17 NOTE — PROGRESS NOTES
CONSULT PROGRESS NOTES    SERVICE DATE: 1/17/2025   SERVICE TIME: 10:30 AM    CONSULTING SERVICE: Nephrology    ASSESSMENT AND PLAN   69-year-old woman in on dialysis admitted with clotted arteriovenous graft, now alteration in mental status.  1.  End-stage renal disease  2.  Malfunctioning AVG  3.  Edema  4.  Fluid overload  5.  Essential hypertension  6.  Anemia of chronic kidney disease     Status post stent placement, now the AVG does not seem to be working well on dialysis, though I hear a soft bruit today.  Status post placement of tunneled hemodialysis catheter.  I have requested vascular surgery to weigh in on the case as well, she will need more definitive arteriovenous access in the outpatient setting.    No further need for anticoagulation for clotted arteriovenous access at this point.  Seems to have pulmonary edema, unclear why, poor oral intake and has had decent fluid removal during this hospitalization.  Hypotension precludes aggressive ultrafiltration.  Attempt 2 L volume removal today during a 3.5-hour hemodialysis treatment.  Erythropoietin stimulating agents to be given with dialysis.  There is no need for daily routine chemistry in this dialysis patient.  I will follow her.  Case discussed with the ICU team.  I have no objection to her transferring to a stepdown or general medical floor bed today, even before hemodialysis.  Dr. Solorzano is available this weekend.    SUBJECTIVE  INTERVAL HPI: She has no major dyspnea.  No increase in oxygen requirement.  Her blood pressure has been stable.  She tolerated 1.5 L volume removal on ultrafiltration run yesterday.  She has a little bit of an appetite.  No major issues otherwise.    MEDICATIONS:  amiodarone, 200 mg, oral, Daily with breakfast  [Held by provider] amLODIPine, 5 mg, oral, Daily  aspirin, 81 mg, oral, Daily  atorvastatin, 40 mg, oral, Nightly  [Held by provider] clopidogrel, 75 mg, oral, Daily  epoetin debra or biosimilar, 5,000 Units,  subcutaneous, Every Mon/Wed/Fri  heparin (porcine), 5,000 Units, subcutaneous, q8h SUMIT  heparin, 2,000 Units, intra-catheter, After Dialysis  heparin, 2,000 Units, intra-catheter, After Dialysis  heparin, 2,000 Units, intra-catheter, After Dialysis  insulin lispro, 0-5 Units, subcutaneous, TID AC  ipratropium-albuteroL, 3 mL, nebulization, 4x daily  levothyroxine, 200 mcg, oral, Daily  levothyroxine, 50 mcg, oral, Daily  [Held by provider] metoprolol succinate XL, 50 mg, oral, Daily  oxygen, , inhalation, Continuous - Inhalation  pantoprazole, 40 mg, intravenous, BID  sodium chloride, 3 mL, nebulization, 4x daily  venlafaxine XR, 150 mg, oral, Daily             PRN medications: acetaminophen **OR** acetaminophen **OR** acetaminophen, albuterol, benzocaine-menthol, bisacodyl, dextromethorphan-guaifenesin, dextrose, dextrose, glucagon, glucagon, guaiFENesin, melatonin, oxygen, polyethylene glycol, prochlorperazine **OR** prochlorperazine **OR** prochlorperazine     OBJECTIVE  PHYSICAL EXAM:   Heart Rate:  []   Temp:  [36.5 °C (97.7 °F)-36.9 °C (98.4 °F)]   Resp:  [12-28]   BP: ()/(54-78)   Weight:  [65 kg (143 lb 4.8 oz)]   SpO2:  [92 %-100 %]   Body mass index is 23.13 kg/m².  This is a chronically ill-appearing woman, seems much older than her known age  Very pale skin  Hearing intact  Phonation intact  Moist mucosa  Normal S1/normal S2  Lungs are clear anteriorly, increased work of breathing  Abdomen is soft, nondistended, nontender, positive bowel sounds  No Fay catheter in place, no suprapubic tenderness to palpation  Trace bilateral lower extremity edema  She has a right upper extremity graft and has an extensive bandage over it with significant blood on the bandage.  There is a faint bruit, but there is no thrill  Moves 4 limbs spontaneously  No obvious joint deformities  No lymphadenopathy  Right internal jugular tunneled hemodialysis catheter in place    DATA:   Labs:  Results for orders placed  or performed during the hospital encounter of 01/10/25 (from the past 96 hours)   POCT GLUCOSE   Result Value Ref Range    POCT Glucose 148 (H) 74 - 99 mg/dL   aPTT   Result Value Ref Range    aPTT 34.2 (H) 22.0 - 32.5 seconds   POCT GLUCOSE   Result Value Ref Range    POCT Glucose 94 74 - 99 mg/dL   aPTT   Result Value Ref Range    aPTT 64.1 (H) 22.0 - 32.5 seconds   Lavender Top   Result Value Ref Range    Extra Tube Hold for add-ons.    PST Top   Result Value Ref Range    Extra Tube Hold for add-ons.    Basic metabolic panel   Result Value Ref Range    Glucose 54 (LL) 74 - 99 mg/dL    Sodium 136 136 - 145 mmol/L    Potassium 3.9 3.5 - 5.3 mmol/L    Chloride 100 98 - 107 mmol/L    Bicarbonate 32 21 - 32 mmol/L    Anion Gap 8 (L) 10 - 20 mmol/L    Urea Nitrogen 19 6 - 23 mg/dL    Creatinine 2.39 (H) 0.50 - 1.05 mg/dL    eGFR 21 (L) >60 mL/min/1.73m*2    Calcium 7.8 (L) 8.6 - 10.3 mg/dL   CBC and Auto Differential   Result Value Ref Range    WBC 10.7 4.4 - 11.3 x10*3/uL    nRBC 0.0 0.0 - 0.0 /100 WBCs    RBC 2.74 (L) 4.00 - 5.20 x10*6/uL    Hemoglobin 8.6 (L) 12.0 - 16.0 g/dL    Hematocrit 27.4 (L) 36.0 - 46.0 %     80 - 100 fL    MCH 31.4 26.0 - 34.0 pg    MCHC 31.4 (L) 32.0 - 36.0 g/dL    RDW 13.6 11.5 - 14.5 %    Platelets 157 150 - 450 x10*3/uL    Neutrophils % 86.6 40.0 - 80.0 %    Immature Granulocytes %, Automated 0.6 0.0 - 0.9 %    Lymphocytes % 6.3 13.0 - 44.0 %    Monocytes % 6.0 2.0 - 10.0 %    Eosinophils % 0.3 0.0 - 6.0 %    Basophils % 0.2 0.0 - 2.0 %    Neutrophils Absolute 9.28 (H) 1.20 - 7.70 x10*3/uL    Immature Granulocytes Absolute, Automated 0.06 0.00 - 0.70 x10*3/uL    Lymphocytes Absolute 0.68 (L) 1.20 - 4.80 x10*3/uL    Monocytes Absolute 0.64 0.10 - 1.00 x10*3/uL    Eosinophils Absolute 0.03 0.00 - 0.70 x10*3/uL    Basophils Absolute 0.02 0.00 - 0.10 x10*3/uL   POCT GLUCOSE   Result Value Ref Range    POCT Glucose 60 (L) 74 - 99 mg/dL   POCT GLUCOSE   Result Value Ref Range    POCT  Glucose 69 (L) 74 - 99 mg/dL   POCT GLUCOSE   Result Value Ref Range    POCT Glucose 107 (H) 74 - 99 mg/dL   aPTT   Result Value Ref Range    aPTT 55.3 (H) 22.0 - 32.5 seconds   POCT GLUCOSE   Result Value Ref Range    POCT Glucose 127 (H) 74 - 99 mg/dL   POCT GLUCOSE   Result Value Ref Range    POCT Glucose 302 (H) 74 - 99 mg/dL   aPTT   Result Value Ref Range    aPTT 48.1 (H) 22.0 - 32.5 seconds   POCT GLUCOSE   Result Value Ref Range    POCT Glucose 199 (H) 74 - 99 mg/dL   CBC and Auto Differential   Result Value Ref Range    WBC 8.7 4.4 - 11.3 x10*3/uL    nRBC 0.0 0.0 - 0.0 /100 WBCs    RBC 2.45 (L) 4.00 - 5.20 x10*6/uL    Hemoglobin 7.6 (L) 12.0 - 16.0 g/dL    Hematocrit 24.5 (L) 36.0 - 46.0 %     80 - 100 fL    MCH 31.0 26.0 - 34.0 pg    MCHC 31.0 (L) 32.0 - 36.0 g/dL    RDW 13.5 11.5 - 14.5 %    Platelets 146 (L) 150 - 450 x10*3/uL    Neutrophils % 80.5 40.0 - 80.0 %    Immature Granulocytes %, Automated 2.0 (H) 0.0 - 0.9 %    Lymphocytes % 9.1 13.0 - 44.0 %    Monocytes % 7.5 2.0 - 10.0 %    Eosinophils % 0.7 0.0 - 6.0 %    Basophils % 0.2 0.0 - 2.0 %    Neutrophils Absolute 6.97 1.20 - 7.70 x10*3/uL    Immature Granulocytes Absolute, Automated 0.17 0.00 - 0.70 x10*3/uL    Lymphocytes Absolute 0.79 (L) 1.20 - 4.80 x10*3/uL    Monocytes Absolute 0.65 0.10 - 1.00 x10*3/uL    Eosinophils Absolute 0.06 0.00 - 0.70 x10*3/uL    Basophils Absolute 0.02 0.00 - 0.10 x10*3/uL   aPTT   Result Value Ref Range    aPTT 41.4 (H) 22.0 - 32.5 seconds   POCT GLUCOSE   Result Value Ref Range    POCT Glucose 140 (H) 74 - 99 mg/dL   POCT GLUCOSE   Result Value Ref Range    POCT Glucose 116 (H) 74 - 99 mg/dL   POCT GLUCOSE   Result Value Ref Range    POCT Glucose 112 (H) 74 - 99 mg/dL   POCT GLUCOSE   Result Value Ref Range    POCT Glucose 105 (H) 74 - 99 mg/dL   Blood Gas Arterial Full Panel   Result Value Ref Range    POCT pH, Arterial 7.27 (L) 7.38 - 7.42 pH    POCT pCO2, Arterial 74 (HH) 38 - 42 mm Hg    POCT pO2,  Arterial 71 (L) 85 - 95 mm Hg    POCT SO2, Arterial 96 94 - 100 %    POCT Oxy Hemoglobin, Arterial 94.6 94.0 - 98.0 %    POCT Hematocrit Calculated, Arterial 26.0 (L) 36.0 - 46.0 %    POCT Sodium, Arterial 132 (L) 136 - 145 mmol/L    POCT Potassium, Arterial 3.4 (L) 3.5 - 5.3 mmol/L    POCT Chloride, Arterial 99 98 - 107 mmol/L    POCT Ionized Calcium, Arterial 1.19 1.10 - 1.33 mmol/L    POCT Glucose, Arterial 120 (H) 74 - 99 mg/dL    POCT Lactate, Arterial 0.6 0.4 - 2.0 mmol/L    POCT Base Excess, Arterial 5.8 (H) -2.0 - 3.0 mmol/L    POCT HCO3 Calculated, Arterial 34.0 (H) 22.0 - 26.0 mmol/L    POCT Hemoglobin, Arterial 8.6 (L) 12.0 - 16.0 g/dL    POCT Anion Gap, Arterial 2 (L) 10 - 25 mmo/L    Patient Temperature 37.0 degrees Celsius    FiO2 36 %    Apparatus CANNULA     Flow 4.0 LPM    Critical Called By BLANCA BARNETT     Critical Called To DR. COLLINS     Critical Call Time 1735     Critical Read Back Y     Critical Note PCO2 74     Site of Arterial Puncture Radial Right     Jonathan's Test Positive    Blood Gas Arterial Full Panel   Result Value Ref Range    POCT pH, Arterial 7.38 7.38 - 7.42 pH    POCT pCO2, Arterial 58 (H) 38 - 42 mm Hg    POCT pO2, Arterial 74 (L) 85 - 95 mm Hg    POCT SO2, Arterial 98 94 - 100 %    POCT Oxy Hemoglobin, Arterial 95.9 94.0 - 98.0 %    POCT Hematocrit Calculated, Arterial 25.0 (L) 36.0 - 46.0 %    POCT Sodium, Arterial 132 (L) 136 - 145 mmol/L    POCT Potassium, Arterial 3.6 3.5 - 5.3 mmol/L    POCT Chloride, Arterial 99 98 - 107 mmol/L    POCT Ionized Calcium, Arterial 1.18 1.10 - 1.33 mmol/L    POCT Glucose, Arterial 110 (H) 74 - 99 mg/dL    POCT Lactate, Arterial 0.5 0.4 - 2.0 mmol/L    POCT Base Excess, Arterial 8.0 (H) -2.0 - 3.0 mmol/L    POCT HCO3 Calculated, Arterial 34.3 (H) 22.0 - 26.0 mmol/L    POCT Hemoglobin, Arterial 8.4 (L) 12.0 - 16.0 g/dL    POCT Anion Gap, Arterial 2 (L) 10 - 25 mmo/L    Patient Temperature 37.0 degrees Celsius    FiO2 30 %    Apparatus  FACE MASK     Ventilator Mode BiPAP     Ventilator Rate 16 bpm    Ipap CMH2O 20.0 cm H2O    Epap CMH2O 8.0 cm H2O    Site of Arterial Puncture Radial Left     Jonathan's Test Positive    CBC and Auto Differential   Result Value Ref Range    WBC 8.3 4.4 - 11.3 x10*3/uL    nRBC 0.2 (H) 0.0 - 0.0 /100 WBCs    RBC 2.81 (L) 4.00 - 5.20 x10*6/uL    Hemoglobin 8.7 (L) 12.0 - 16.0 g/dL    Hematocrit 28.3 (L) 36.0 - 46.0 %     (H) 80 - 100 fL    MCH 31.0 26.0 - 34.0 pg    MCHC 30.7 (L) 32.0 - 36.0 g/dL    RDW 13.6 11.5 - 14.5 %    Platelets 159 150 - 450 x10*3/uL    Neutrophils % 85.4 40.0 - 80.0 %    Immature Granulocytes %, Automated 1.0 (H) 0.0 - 0.9 %    Lymphocytes % 7.1 13.0 - 44.0 %    Monocytes % 5.5 2.0 - 10.0 %    Eosinophils % 0.5 0.0 - 6.0 %    Basophils % 0.5 0.0 - 2.0 %    Neutrophils Absolute 7.10 1.20 - 7.70 x10*3/uL    Immature Granulocytes Absolute, Automated 0.08 0.00 - 0.70 x10*3/uL    Lymphocytes Absolute 0.59 (L) 1.20 - 4.80 x10*3/uL    Monocytes Absolute 0.46 0.10 - 1.00 x10*3/uL    Eosinophils Absolute 0.04 0.00 - 0.70 x10*3/uL    Basophils Absolute 0.04 0.00 - 0.10 x10*3/uL   Renal function panel   Result Value Ref Range    Glucose 108 (H) 74 - 99 mg/dL    Sodium 135 (L) 136 - 145 mmol/L    Potassium 3.8 3.5 - 5.3 mmol/L    Chloride 98 98 - 107 mmol/L    Bicarbonate 31 21 - 32 mmol/L    Anion Gap 10 10 - 20 mmol/L    Urea Nitrogen 8 6 - 23 mg/dL    Creatinine 1.56 (H) 0.50 - 1.05 mg/dL    eGFR 36 (L) >60 mL/min/1.73m*2    Calcium 7.7 (L) 8.6 - 10.3 mg/dL    Phosphorus 1.8 (L) 2.5 - 4.9 mg/dL    Albumin 2.8 (L) 3.4 - 5.0 g/dL   Magnesium   Result Value Ref Range    Magnesium 1.68 1.60 - 2.40 mg/dL   POCT GLUCOSE   Result Value Ref Range    POCT Glucose 135 (H) 74 - 99 mg/dL   Blood Gas Venous Full Panel   Result Value Ref Range    POCT pH, Venous 7.28 (L) 7.33 - 7.43 pH    POCT pCO2, Venous 68 (H) 41 - 51 mm Hg    POCT pO2, Venous 74 (H) 35 - 45 mm Hg    POCT SO2, Venous 97 (H) 45 - 75 %    POCT  Oxy Hemoglobin, Venous 95.7 (H) 45.0 - 75.0 %    POCT Hematocrit Calculated, Venous 25.0 (L) 36.0 - 46.0 %    POCT Sodium, Venous 130 (L) 136 - 145 mmol/L    POCT Potassium, Venous 3.8 3.5 - 5.3 mmol/L    POCT Chloride, Venous 99 98 - 107 mmol/L    POCT Ionized Calicum, Venous 1.21 1.10 - 1.33 mmol/L    POCT Glucose, Venous 105 (H) 74 - 99 mg/dL    POCT Lactate, Venous 0.5 0.4 - 2.0 mmol/L    POCT Base Excess, Venous 4.3 (H) -2.0 - 3.0 mmol/L    POCT HCO3 Calculated, Venous 32.0 (H) 22.0 - 26.0 mmol/L    POCT Hemoglobin, Venous 8.2 (L) 12.0 - 16.0 g/dL    POCT Anion Gap, Venous 3.0 (L) 10.0 - 25.0 mmol/L    Patient Temperature 37.0 degrees Celsius    FiO2 36 %   APTT   Result Value Ref Range    aPTT 31.7 22.0 - 32.5 seconds   CBC and Auto Differential   Result Value Ref Range    WBC 7.7 4.4 - 11.3 x10*3/uL    nRBC 0.0 0.0 - 0.0 /100 WBCs    RBC 2.70 (L) 4.00 - 5.20 x10*6/uL    Hemoglobin 8.3 (L) 12.0 - 16.0 g/dL    Hematocrit 27.1 (L) 36.0 - 46.0 %     80 - 100 fL    MCH 30.7 26.0 - 34.0 pg    MCHC 30.6 (L) 32.0 - 36.0 g/dL    RDW 13.5 11.5 - 14.5 %    Platelets 154 150 - 450 x10*3/uL    Neutrophils % 79.7 40.0 - 80.0 %    Immature Granulocytes %, Automated 1.6 (H) 0.0 - 0.9 %    Lymphocytes % 9.1 13.0 - 44.0 %    Monocytes % 8.0 2.0 - 10.0 %    Eosinophils % 1.2 0.0 - 6.0 %    Basophils % 0.4 0.0 - 2.0 %    Neutrophils Absolute 6.11 1.20 - 7.70 x10*3/uL    Immature Granulocytes Absolute, Automated 0.12 0.00 - 0.70 x10*3/uL    Lymphocytes Absolute 0.70 (L) 1.20 - 4.80 x10*3/uL    Monocytes Absolute 0.61 0.10 - 1.00 x10*3/uL    Eosinophils Absolute 0.09 0.00 - 0.70 x10*3/uL    Basophils Absolute 0.03 0.00 - 0.10 x10*3/uL   Renal function panel   Result Value Ref Range    Glucose 106 (H) 74 - 99 mg/dL    Sodium 135 (L) 136 - 145 mmol/L    Potassium 3.7 3.5 - 5.3 mmol/L    Chloride 97 (L) 98 - 107 mmol/L    Bicarbonate 32 21 - 32 mmol/L    Anion Gap 10 10 - 20 mmol/L    Urea Nitrogen 12 6 - 23 mg/dL     Creatinine 1.86 (H) 0.50 - 1.05 mg/dL    eGFR 29 (L) >60 mL/min/1.73m*2    Calcium 7.9 (L) 8.6 - 10.3 mg/dL    Phosphorus 4.3 2.5 - 4.9 mg/dL    Albumin 2.6 (L) 3.4 - 5.0 g/dL   Magnesium   Result Value Ref Range    Magnesium 2.41 (H) 1.60 - 2.40 mg/dL   POCT GLUCOSE   Result Value Ref Range    POCT Glucose 105 (H) 74 - 99 mg/dL   POCT GLUCOSE   Result Value Ref Range    POCT Glucose 153 (H) 74 - 99 mg/dL   POCT GLUCOSE   Result Value Ref Range    POCT Glucose 196 (H) 74 - 99 mg/dL   POCT GLUCOSE   Result Value Ref Range    POCT Glucose 224 (H) 74 - 99 mg/dL   Blood Gas Arterial Full Panel   Result Value Ref Range    POCT pH, Arterial 7.41 7.38 - 7.42 pH    POCT pCO2, Arterial 49 (H) 38 - 42 mm Hg    POCT pO2, Arterial 68 (L) 85 - 95 mm Hg    POCT SO2, Arterial 95 94 - 100 %    POCT Oxy Hemoglobin, Arterial 94.2 94.0 - 98.0 %    POCT Hematocrit Calculated, Arterial 26.0 (L) 36.0 - 46.0 %    POCT Sodium, Arterial 132 (L) 136 - 145 mmol/L    POCT Potassium, Arterial 3.9 3.5 - 5.3 mmol/L    POCT Chloride, Arterial 99 98 - 107 mmol/L    POCT Ionized Calcium, Arterial 1.17 1.10 - 1.33 mmol/L    POCT Glucose, Arterial 117 (H) 74 - 99 mg/dL    POCT Lactate, Arterial 0.5 0.4 - 2.0 mmol/L    POCT Base Excess, Arterial 5.7 (H) -2.0 - 3.0 mmol/L    POCT HCO3 Calculated, Arterial 31.1 (H) 22.0 - 26.0 mmol/L    POCT Hemoglobin, Arterial 8.6 (L) 12.0 - 16.0 g/dL    POCT Anion Gap, Arterial 6 (L) 10 - 25 mmo/L    Patient Temperature 37.0 degrees Celsius    FiO2 28 %    Apparatus CANNULA     Flow 28.0 LPM    Site of Arterial Puncture Radial Left     Jonathan's Test Positive    CBC and Auto Differential   Result Value Ref Range    WBC 10.0 4.4 - 11.3 x10*3/uL    nRBC 0.0 0.0 - 0.0 /100 WBCs    RBC 2.90 (L) 4.00 - 5.20 x10*6/uL    Hemoglobin 8.9 (L) 12.0 - 16.0 g/dL    Hematocrit 28.5 (L) 36.0 - 46.0 %    MCV 98 80 - 100 fL    MCH 30.7 26.0 - 34.0 pg    MCHC 31.2 (L) 32.0 - 36.0 g/dL    RDW 13.6 11.5 - 14.5 %    Platelets 152 150 - 450  x10*3/uL    Neutrophils % 82.9 40.0 - 80.0 %    Immature Granulocytes %, Automated 1.3 (H) 0.0 - 0.9 %    Lymphocytes % 8.9 13.0 - 44.0 %    Monocytes % 5.9 2.0 - 10.0 %    Eosinophils % 0.7 0.0 - 6.0 %    Basophils % 0.3 0.0 - 2.0 %    Neutrophils Absolute 8.24 (H) 1.20 - 7.70 x10*3/uL    Immature Granulocytes Absolute, Automated 0.13 0.00 - 0.70 x10*3/uL    Lymphocytes Absolute 0.89 (L) 1.20 - 4.80 x10*3/uL    Monocytes Absolute 0.59 0.10 - 1.00 x10*3/uL    Eosinophils Absolute 0.07 0.00 - 0.70 x10*3/uL    Basophils Absolute 0.03 0.00 - 0.10 x10*3/uL   Renal function panel   Result Value Ref Range    Glucose 116 (H) 74 - 99 mg/dL    Sodium 134 (L) 136 - 145 mmol/L    Potassium 3.7 3.5 - 5.3 mmol/L    Chloride 97 (L) 98 - 107 mmol/L    Bicarbonate 29 21 - 32 mmol/L    Anion Gap 12 10 - 20 mmol/L    Urea Nitrogen 24 (H) 6 - 23 mg/dL    Creatinine 2.68 (H) 0.50 - 1.05 mg/dL    eGFR 19 (L) >60 mL/min/1.73m*2    Calcium 8.0 (L) 8.6 - 10.3 mg/dL    Phosphorus 2.5 2.5 - 4.9 mg/dL    Albumin 2.7 (L) 3.4 - 5.0 g/dL   Magnesium   Result Value Ref Range    Magnesium 2.21 1.60 - 2.40 mg/dL   POCT GLUCOSE   Result Value Ref Range    POCT Glucose 182 (H) 74 - 99 mg/dL     *Note: Due to a large number of results and/or encounters for the requested time period, some results have not been displayed. A complete set of results can be found in Results Review.         SIGNATURE: Joseph Mitchell MD PATIENT NAME: Daphne Morris   DATE: January 17, 2025 MRN: [id number]   TIME: 10:30 AM PAGER: [phone]

## 2025-01-17 NOTE — TELEPHONE ENCOUNTER
Patient was transferred to ICU 1/15/25 for hypoxic/hypercapnic respiratory failure. Per today's Critical Care note: CXR unchanged from yesterday. Patient is asymptomatic and on home O2, will defer thoracentesis at this time. Plan for HD today. Wore BiPAP intermittently OVN. Plan to downgrade.

## 2025-01-17 NOTE — CONSULTS
Wound Care Consult     Visit Date: 1/17/2025      Patient Name: Daphne Morris         MRN: [id number]           YOB: 1955     Reason for Consult: Wounds PI of Sacrum , skin tear of right lower arm , left pretibial abrasion, and PI of left posterior calcaneous / ankle.      Wound History: defer to the patient's chart     Pertinent Labs:   [place], Urine   Date Value Ref Range Status   07/15/2020 1,714 (H) 0 - 23 MG/L Final     Comment:     RECHECKED DILUTED  Performed at [place] [id number]       Albumin   Date Value Ref Range Status   01/17/2025 2.7 (L) 3.4 - 5.0 g/dL Final     Albumin, Urine Random   Date Value Ref Range Status   02/01/2024 >2,250.0 Not established mg/L Final       Wound Assessment:  Wound 12/26/24 Pretibial Left;Proximal (Active)   Wound Image   01/10/25 1323   Shape irregular 01/17/25 1226   Wound Length (cm) 5.5 cm 01/17/25 1226   Wound Width (cm) 4.5 cm 01/17/25 1226   Wound Surface Area (cm^2) 24.75 cm^2 01/17/25 1226   Wound Depth (cm) 0 cm 01/17/25 1226   Wound Volume (cm^3) 0 cm^3 01/17/25 1226   State of Healing Closed wound edges 01/09/25 2252   Margins Attached edges;Poorly defined 01/17/25 1226   Drainage Description Colorless 01/06/25 1300   Drainage Amount None 01/17/25 1200   Dressing Impregnated gauze 01/16/25 1600   Dressing Changed Other (Comment) 01/09/25 2252   Dressing Status Clean;Dry 01/17/25 1200       Wound 12/26/24 Pressure Injury Heel Dorsal;Left (Active)   Wound Image   01/10/25 1323   Site Assessment Black;Clean;Dry;Eschar 01/17/25 1226   Brea-Wound Assessment Clean;Dry;Fragile;Intact 01/17/25 1226   Non-staged Wound Description Not applicable 01/17/25 1226   Pressure Injury Stage U 01/17/25 1226   Shape round 01/17/25 1226   Wound Length (cm) 0.3 cm 01/17/25 1226   Wound Width (cm) 0.3 cm 01/17/25 1226   Wound Surface Area (cm^2) 0.09 cm^2 01/17/25 1226   Drainage Description None 01/17/25 1200   Drainage Amount None 01/17/25  1200   Dressing Foam 01/17/25 1200   Dressing Changed New 01/07/25 2100   Dressing Status Clean;Dry 01/17/25 1200       Wound 01/09/25 Skin Tear Arm Lower;Right (Active)   Wound Image   01/10/25 1323   Site Assessment Black;Brown;Burgundy;Bleeding;Excoriated;Fragile;Purple;Red 01/17/25 1226   Non-staged Wound Description Partial thickness 01/17/25 1226   Shape elongated 01/17/25 1226   Wound Length (cm) 13.5 cm 01/17/25 1226   Wound Width (cm) 3 cm 01/17/25 1226   Wound Surface Area (cm^2) 40.5 cm^2 01/17/25 1226   Wound Depth (cm) 0.1 cm 01/17/25 1226   Wound Volume (cm^3) 4.05 cm^3 01/17/25 1226   Margins Attached edges;Poorly defined 01/17/25 1226   Drainage Description Serosanguineous 01/17/25 1226   Drainage Amount Small 01/17/25 1226   Dressing Barrier film 01/17/25 1200   Dressing Changed Reinforced 01/12/25 1404   Dressing Status Clean;Dry 01/16/25 1600       Wound 01/10/25 Pressure Injury Sacrum (Active)   Site Assessment Blanchable erythema;Dry;Fragile;Pink 01/17/25 1226   Brea-Wound Assessment Blanchable erythema;Clean;Dry;Fragile;Intact 01/17/25 1226   Non-staged Wound Description Not applicable 01/17/25 1226   Pressure Injury Stage 1 01/17/25 1226   Shape diffuse 01/17/25 1226   Wound Length (cm) 5 cm 01/17/25 1226   Wound Width (cm) 4.5 cm 01/17/25 1226   Wound Surface Area (cm^2) 22.5 cm^2 01/17/25 1226   Wound Depth (cm) 0 cm 01/17/25 1226   Wound Volume (cm^3) 0 cm^3 01/17/25 1226   Drainage Description None 01/17/25 1200   Drainage Amount None 01/17/25 1200   Dressing Silicone border dressing 01/17/25 1200   Dressing Changed New 01/12/25 1404   Dressing Status Clean;Occlusive 01/17/25 1200   Current and additional preventative measure recommended are based on continuous patient assessment of patient's overall health , wound , and Ramon / skin scores, and are adjusted to meet patient's care needs.     Wound Team Summary Assessment: Wound care recommendations:  Wash wounds with soap and water and  pat dry.  1) PI of Sacrum stage 1 : apply TRIAD hydrophilic wound cream and cover with foam border dressing. Daily dressing and wound treatments.  2) Skin tear of Right Lower arm:  apply Mepitel-One wound cover and secure with roll gauze. Change dressing every 4 days.  3) Wound of Pretibial Left ; proximal : Apply Medihoney to wound bed and cover with petroleum gauze and secure with roll gauze. Change dressing every 3-4 days.  4) PI of heal Dorsal left: Apply TRIAD hydrophilic wound cream to wound and affected area and cover with foam dressing. Change dressing daily .       Wound Team Plan: Follow up to recommendations as needed.     Temo Rodriguez RN  1/17/2025  12:45 PM

## 2025-01-17 NOTE — CARE PLAN
The patient's goals for the shift include      The clinical goals for the shift include remain hemodynamically stable      Problem: Skin  Goal: Decreased wound size/increased tissue granulation at next dressing change  Outcome: Progressing  Flowsheets (Taken 1/17/2025 0945)  Decreased wound size/increased tissue granulation at next dressing change:   Protective dressings over bony prominences   Promote sleep for wound healing   Utilize specialty bed per algorithm  Goal: Participates in plan/prevention/treatment measures  Outcome: Progressing  Flowsheets (Taken 1/17/2025 0945)  Participates in plan/prevention/treatment measures:   Discuss with provider PT/OT consult   Elevate heels  Goal: Prevent/manage excess moisture  Outcome: Progressing  Flowsheets (Taken 1/17/2025 0945)  Prevent/manage excess moisture:   Cleanse incontinence/protect with barrier cream   Moisturize dry skin   Follow provider orders for dressing changes  Goal: Prevent/minimize sheer/friction injuries  Outcome: Progressing  Flowsheets (Taken 1/17/2025 0945)  Prevent/minimize sheer/friction injuries:   Complete micro-shifts as needed if patient unable. Adjust patient position to relieve pressure points, not a full turn   Increase activity/out of bed for meals   Use pull sheet   HOB 30 degrees or less   Turn/reposition every 2 hours/use positioning/transfer devices  Goal: Promote/optimize nutrition  Outcome: Progressing  Flowsheets (Taken 1/17/2025 0945)  Promote/optimize nutrition:   Assist with feeding   Monitor/record intake including meals   Consume > 50% meals/supplements  Goal: Promote skin healing  Outcome: Progressing  Flowsheets (Taken 1/17/2025 0945)  Promote skin healing:   Assess skin/pad under line(s)/device(s)   Protective dressings over bony prominences   Turn/reposition every 2 hours/use positioning/transfer devices   Ensure correct size (line/device) and apply per  instructions     Problem: Fall/Injury  Goal: Not fall by  end of shift  Outcome: Progressing  Goal: Be free from injury by end of the shift  Outcome: Progressing  Goal: Verbalize understanding of personal risk factors for fall in the hospital  Outcome: Progressing  Goal: Verbalize understanding of risk factor reduction measures to prevent injury from fall in the home  Outcome: Progressing  Goal: Use assistive devices by end of the shift  Outcome: Progressing  Goal: Pace activities to prevent fatigue by end of the shift  Outcome: Progressing     Problem: Nutrition  Goal: Oral intake greater 75%  Outcome: Progressing  Goal: Consume prescribed supplement  Outcome: Progressing  Goal: Promote healing  Outcome: Progressing     Problem: Dialysis  Goal: I will slow progression of end-stage renal disease through participating in dialysis 3 times a week  Outcome: Progressing     Problem: Respiratory  Goal: Minimal/no exertional discomfort or dyspnea this shift  Outcome: Progressing  Goal: No signs of respiratory distress (eg. Use of accessory muscles. Peds grunting)  Outcome: Progressing  Goal: Patent airway maintained this shift  Outcome: Progressing  Goal: Verbalize decreased shortness of breath this shift  Outcome: Progressing  Goal: Wean oxygen to maintain O2 saturation per order/standard this shift  Outcome: Progressing     Problem: Bathing  Goal: STG - Patient will bathe upper body with minimal assist  Outcome: Progressing     Problem: Dressing Upper Extremities  Goal: LTG - Patient will complete upper body dressing with set up  Outcome: Progressing     Problem: Grooming  Goal: STG - Patient completes grooming with set up  Outcome: Progressing     Problem: Toileting  Goal: STG - Patient will complete toileting tasks with 2ww and minimal assist  Outcome: Progressing     Problem: Pain - Adult  Goal: Verbalizes/displays adequate comfort level or baseline comfort level  Outcome: Progressing     Problem: Safety - Adult  Goal: Free from fall injury  Outcome: Progressing     Problem:  Discharge Planning  Goal: Discharge to home or other facility with appropriate resources  Outcome: Progressing     Problem: Chronic Conditions and Co-morbidities  Goal: Patient's chronic conditions and co-morbidity symptoms are monitored and maintained or improved  Outcome: Progressing     Problem: Diabetes  Goal: Achieve decreasing blood glucose levels by end of shift  Outcome: Progressing  Goal: Increase stability of blood glucose readings by end of shift  Outcome: Progressing  Goal: Decrease in ketones present in urine by end of shift  Outcome: Progressing  Goal: Maintain electrolyte levels within acceptable range throughout shift  Outcome: Progressing  Goal: Maintain glucose levels >70mg/dl to <250mg/dl throughout shift  Outcome: Progressing  Goal: No changes in neurological exam by end of shift  Outcome: Progressing  Goal: Learn about and adhere to nutrition recommendations by end of shift  Outcome: Progressing  Goal: Vital signs within normal range for age by end of shift  Outcome: Progressing  Goal: Increase self care and/or family involovement by end of shift  Outcome: Progressing  Goal: Receive DSME education by end of shift  Outcome: Progressing

## 2025-01-18 LAB
ALBUMIN SERPL BCP-MCNC: 2.6 G/DL (ref 3.4–5)
ANION GAP SERPL CALCULATED.3IONS-SCNC: 10 MMOL/L (ref 10–20)
BASOPHILS # BLD AUTO: 0.05 X10*3/UL (ref 0–0.1)
BASOPHILS NFR BLD AUTO: 0.5 %
BUN SERPL-MCNC: 18 MG/DL (ref 6–23)
CALCIUM SERPL-MCNC: 7.4 MG/DL (ref 8.6–10.3)
CHLORIDE SERPL-SCNC: 98 MMOL/L (ref 98–107)
CO2 SERPL-SCNC: 31 MMOL/L (ref 21–32)
CREAT SERPL-MCNC: 2.29 MG/DL (ref 0.5–1.05)
EGFRCR SERPLBLD CKD-EPI 2021: 22 ML/MIN/1.73M*2
EOSINOPHIL # BLD AUTO: 0.07 X10*3/UL (ref 0–0.7)
EOSINOPHIL NFR BLD AUTO: 0.7 %
ERYTHROCYTE [DISTWIDTH] IN BLOOD BY AUTOMATED COUNT: 13.6 % (ref 11.5–14.5)
GLUCOSE BLD MANUAL STRIP-MCNC: 188 MG/DL (ref 74–99)
GLUCOSE BLD MANUAL STRIP-MCNC: 217 MG/DL (ref 74–99)
GLUCOSE SERPL-MCNC: 143 MG/DL (ref 74–99)
HCT VFR BLD AUTO: 26.8 % (ref 36–46)
HGB BLD-MCNC: 8.2 G/DL (ref 12–16)
IMM GRANULOCYTES # BLD AUTO: 0.2 X10*3/UL (ref 0–0.7)
IMM GRANULOCYTES NFR BLD AUTO: 1.9 % (ref 0–0.9)
LYMPHOCYTES # BLD AUTO: 1.15 X10*3/UL (ref 1.2–4.8)
LYMPHOCYTES NFR BLD AUTO: 11.1 %
MAGNESIUM SERPL-MCNC: 1.85 MG/DL (ref 1.6–2.4)
MCH RBC QN AUTO: 30.6 PG (ref 26–34)
MCHC RBC AUTO-ENTMCNC: 30.6 G/DL (ref 32–36)
MCV RBC AUTO: 100 FL (ref 80–100)
MONOCYTES # BLD AUTO: 0.81 X10*3/UL (ref 0.1–1)
MONOCYTES NFR BLD AUTO: 7.8 %
NEUTROPHILS # BLD AUTO: 8.11 X10*3/UL (ref 1.2–7.7)
NEUTROPHILS NFR BLD AUTO: 78 %
NRBC BLD-RTO: 0 /100 WBCS (ref 0–0)
PHOSPHATE SERPL-MCNC: 1.8 MG/DL (ref 2.5–4.9)
PLATELET # BLD AUTO: 156 X10*3/UL (ref 150–450)
POTASSIUM SERPL-SCNC: 4 MMOL/L (ref 3.5–5.3)
RBC # BLD AUTO: 2.68 X10*6/UL (ref 4–5.2)
SODIUM SERPL-SCNC: 135 MMOL/L (ref 136–145)
WBC # BLD AUTO: 10.4 X10*3/UL (ref 4.4–11.3)

## 2025-01-18 PROCEDURE — 2500000001 HC RX 250 WO HCPCS SELF ADMINISTERED DRUGS (ALT 637 FOR MEDICARE OP)

## 2025-01-18 PROCEDURE — 94668 MNPJ CHEST WALL SBSQ: CPT

## 2025-01-18 PROCEDURE — 99232 SBSQ HOSP IP/OBS MODERATE 35: CPT | Performed by: NURSE PRACTITIONER

## 2025-01-18 PROCEDURE — 2500000002 HC RX 250 W HCPCS SELF ADMINISTERED DRUGS (ALT 637 FOR MEDICARE OP, ALT 636 FOR OP/ED)

## 2025-01-18 PROCEDURE — 85025 COMPLETE CBC W/AUTO DIFF WBC: CPT

## 2025-01-18 PROCEDURE — 82947 ASSAY GLUCOSE BLOOD QUANT: CPT

## 2025-01-18 PROCEDURE — 36415 COLL VENOUS BLD VENIPUNCTURE: CPT

## 2025-01-18 PROCEDURE — 9420000001 HC RT PATIENT EDUCATION 5 MIN

## 2025-01-18 PROCEDURE — 2500000005 HC RX 250 GENERAL PHARMACY W/O HCPCS

## 2025-01-18 PROCEDURE — 2060000001 HC INTERMEDIATE ICU ROOM DAILY

## 2025-01-18 PROCEDURE — 84100 ASSAY OF PHOSPHORUS: CPT

## 2025-01-18 PROCEDURE — 83735 ASSAY OF MAGNESIUM: CPT

## 2025-01-18 PROCEDURE — 94640 AIRWAY INHALATION TREATMENT: CPT

## 2025-01-18 PROCEDURE — 2500000004 HC RX 250 GENERAL PHARMACY W/ HCPCS (ALT 636 FOR OP/ED)

## 2025-01-18 RX ADMIN — Medication 3 L/MIN: at 07:51

## 2025-01-18 RX ADMIN — IPRATROPIUM BROMIDE AND ALBUTEROL SULFATE 3 ML: 2.5; .5 SOLUTION RESPIRATORY (INHALATION) at 13:10

## 2025-01-18 RX ADMIN — IPRATROPIUM BROMIDE AND ALBUTEROL SULFATE 3 ML: 2.5; .5 SOLUTION RESPIRATORY (INHALATION) at 19:54

## 2025-01-18 RX ADMIN — PANTOPRAZOLE SODIUM 40 MG: 40 INJECTION, POWDER, FOR SOLUTION INTRAVENOUS at 10:00

## 2025-01-18 RX ADMIN — LEVOTHYROXINE SODIUM 200 MCG: 0.1 TABLET ORAL at 06:18

## 2025-01-18 RX ADMIN — Medication 3 L/MIN: at 19:55

## 2025-01-18 RX ADMIN — VENLAFAXINE HYDROCHLORIDE 150 MG: 150 CAPSULE, EXTENDED RELEASE ORAL at 10:00

## 2025-01-18 RX ADMIN — INSULIN LISPRO 2 UNITS: 100 INJECTION, SOLUTION INTRAVENOUS; SUBCUTANEOUS at 13:01

## 2025-01-18 RX ADMIN — Medication 3 ML: at 19:54

## 2025-01-18 RX ADMIN — HEPARIN SODIUM 5000 UNITS: 5000 INJECTION, SOLUTION INTRAVENOUS; SUBCUTANEOUS at 06:18

## 2025-01-18 RX ADMIN — IPRATROPIUM BROMIDE AND ALBUTEROL SULFATE 3 ML: 2.5; .5 SOLUTION RESPIRATORY (INHALATION) at 15:01

## 2025-01-18 RX ADMIN — METOPROLOL TARTRATE 25 MG: 25 TABLET, FILM COATED ORAL at 10:00

## 2025-01-18 RX ADMIN — HEPARIN SODIUM 5000 UNITS: 5000 INJECTION, SOLUTION INTRAVENOUS; SUBCUTANEOUS at 15:00

## 2025-01-18 RX ADMIN — AMIODARONE HYDROCHLORIDE 200 MG: 200 TABLET ORAL at 10:00

## 2025-01-18 RX ADMIN — HEPARIN SODIUM 5000 UNITS: 5000 INJECTION, SOLUTION INTRAVENOUS; SUBCUTANEOUS at 21:03

## 2025-01-18 RX ADMIN — ATORVASTATIN CALCIUM 40 MG: 40 TABLET, FILM COATED ORAL at 21:03

## 2025-01-18 RX ADMIN — ACETAMINOPHEN 650 MG: 325 TABLET ORAL at 18:52

## 2025-01-18 RX ADMIN — LEVOTHYROXINE SODIUM 50 MCG: 0.05 TABLET ORAL at 07:00

## 2025-01-18 RX ADMIN — GUAIFENESIN AND DEXTROMETHORPHAN 5 ML: 10; 100 SYRUP ORAL at 22:56

## 2025-01-18 RX ADMIN — Medication 3 ML: at 07:51

## 2025-01-18 RX ADMIN — METOPROLOL TARTRATE 25 MG: 25 TABLET, FILM COATED ORAL at 21:03

## 2025-01-18 RX ADMIN — ASPIRIN 81 MG: 81 TABLET, COATED ORAL at 10:00

## 2025-01-18 RX ADMIN — Medication 3 ML: at 15:01

## 2025-01-18 RX ADMIN — PANTOPRAZOLE SODIUM 40 MG: 40 INJECTION, POWDER, FOR SOLUTION INTRAVENOUS at 21:02

## 2025-01-18 RX ADMIN — IPRATROPIUM BROMIDE AND ALBUTEROL SULFATE 3 ML: 2.5; .5 SOLUTION RESPIRATORY (INHALATION) at 07:51

## 2025-01-18 RX ADMIN — Medication 3 ML: at 13:10

## 2025-01-18 ASSESSMENT — PAIN SCALES - GENERAL
PAINLEVEL_OUTOF10: 2
PAINLEVEL_OUTOF10: 3

## 2025-01-18 ASSESSMENT — COGNITIVE AND FUNCTIONAL STATUS - GENERAL
MOVING FROM LYING ON BACK TO SITTING ON SIDE OF FLAT BED WITH BEDRAILS: A LITTLE
DRESSING REGULAR LOWER BODY CLOTHING: A LOT
PERSONAL GROOMING: A LOT
DRESSING REGULAR LOWER BODY CLOTHING: A LOT
MOVING FROM LYING ON BACK TO SITTING ON SIDE OF FLAT BED WITH BEDRAILS: A LITTLE
CLIMB 3 TO 5 STEPS WITH RAILING: A LOT
DRESSING REGULAR UPPER BODY CLOTHING: A LOT
STANDING UP FROM CHAIR USING ARMS: A LITTLE
MOVING TO AND FROM BED TO CHAIR: A LITTLE
CLIMB 3 TO 5 STEPS WITH RAILING: A LOT
DAILY ACTIVITIY SCORE: 14
MOBILITY SCORE: 16
HELP NEEDED FOR BATHING: A LOT
STANDING UP FROM CHAIR USING ARMS: A LITTLE
HELP NEEDED FOR BATHING: A LOT
TOILETING: A LOT
WALKING IN HOSPITAL ROOM: A LOT
TURNING FROM BACK TO SIDE WHILE IN FLAT BAD: A LITTLE
DRESSING REGULAR UPPER BODY CLOTHING: A LOT
TOILETING: A LOT
PERSONAL GROOMING: A LOT
MOVING TO AND FROM BED TO CHAIR: A LITTLE
WALKING IN HOSPITAL ROOM: A LOT
MOBILITY SCORE: 16
TURNING FROM BACK TO SIDE WHILE IN FLAT BAD: A LITTLE
DAILY ACTIVITIY SCORE: 14

## 2025-01-18 ASSESSMENT — PAIN DESCRIPTION - DESCRIPTORS: DESCRIPTORS: ACHING;DULL;DISCOMFORT

## 2025-01-18 ASSESSMENT — PAIN - FUNCTIONAL ASSESSMENT: PAIN_FUNCTIONAL_ASSESSMENT: 0-10

## 2025-01-18 NOTE — ASSESSMENT & PLAN NOTE
Followed by nephrology (Dr. Mitchell)  Dialysis days are Tuesday, Thursday, and Saturday    Respiratory acidosis with acute on chronic hypoxic respiratory failure  Much improved has been transferred out of ICU  Patient at baseline on 3 L of nasal cannula with Bipap at night    Metabolic encephalopathy  resolved  Monitor closely    Hypertension  Continue home amlodipine    Atrial fibrillation  Not on anticoagulation  S/p Watchman 2023  SDU/telemetry  Continue with BB and amiodarone     Hyperlipidemia  Continue home atorvastatin    Hypothyroid  Continue home levothyroxine    Chronic diastolic heart failure  Bradycardia  resolved    Type 2 diabetes mellitus  Hypoglycemia protocol  Accu-Cheks before meals and at bedtime  Insulin sliding scale  Hemoglobin A1c 8.1 on January 1, 2025    Disposition    Anticipated discharge to SNF.   Will need vascular and nephrology final recommendation  Likely ready for discharge in 1-2 days pending recommendations

## 2025-01-18 NOTE — PROGRESS NOTES
Daphne Morris is a 69 y.o. female on day 8 of admission presenting with Clotted renal dialysis AV graft (CMS-[facility]).      Subjective   Patient seen and examined. Patient is out of ICU. Patient is on her home O2 3L NC with Bipap at night. Patient mentation much improved, she is alert, awake, and oriented. Denies CP or SOB.   Objective     Last Recorded Vitals  /63 (BP Location: Left arm, Patient Position: Lying)   Pulse 104   Temp 36.7 °C (98.1 °F) (Temporal)   Resp 20   Wt 62.9 kg (138 lb 10.7 oz)   SpO2 97%   Intake/Output last 3 Shifts:    Intake/Output Summary (Last 24 hours) at 1/18/2025 1307  Last data filed at 1/17/2025 1738  Gross per 24 hour   Intake 1200 ml   Output 3422 ml   Net -2222 ml       Admission Weight  Weight: 70 kg (154 lb 5.2 oz) (01/11/25 0537)    Daily Weight  01/18/25 : 62.9 kg (138 lb 10.7 oz)          Physical Exam  Vitals reviewed.   Constitutional:       Appearance: She is ill-appearing.   HENT:      Head: Normocephalic.      Nose: Nose normal.   Eyes:      Extraocular Movements: Extraocular movements intact.   Cardiovascular:      Rate and Rhythm: Regular rhythm.   Pulmonary:      Effort: Pulmonary effort is normal.   Abdominal:      General: Bowel sounds are normal.   Musculoskeletal:      Cervical back: Neck supple.      Comments: Right UE dialysis access    Skin:     General: Skin is warm.   Neurological:      Mental Status: She is alert.      Comments: S/p IR procedure, patient is lethargic          Assessment/Plan       This patient has a central line   Reason for the central line remaining today? Dialysis/Hemapheresis    Daphne Morris is a 69 year old female patient with pmhx of ESRD, anemia, afib, chf, copd, CVA, depression dm, chronic hypoxic respiratory failure on 3L O2 at home, gerd hyperlipidemia, hypertension, hypothyroid, renal cell carcinoma s/p resection in 8/21 and vascultitis. Patient was admitted 1/9 at [facility] for clotted AV fistula and transferred to [facility], [place], operated by [facility]  to be seen by vascular surgery. Patient s/p unsuccessful right graft thrombectomy. Patient had  tunneled cath placement by IR.  Patient on arrival form IR was lethargic and not responding well. ABG was ordered showing hypercapnic respiratory acidosis with hypoxemia, patient was placed on bipap and transferred to ICU.     Assessment & Plan  Clotted renal dialysis AV graft (CMS-HCC)  Plavix on hold  Vascular surgery/nephrology are following  Tunneled catheter placed 1/15    ESRD (end stage renal disease) (Multi)  Followed by nephrology (Dr. Mitchell)  Dialysis days are Tuesday, Thursday, and Saturday    Respiratory acidosis with acute on chronic hypoxic respiratory failure  Much improved has been transferred out of ICU  Patient at baseline on 3 L of nasal cannula with Bipap at night    Metabolic encephalopathy  resolved  Monitor closely    Hypertension  Continue home amlodipine    Atrial fibrillation  Not on anticoagulation  S/p Watchman 2023  SDU/telemetry  Continue with BB and amiodarone     Hyperlipidemia  Continue home atorvastatin    Hypothyroid  Continue home levothyroxine    Chronic diastolic heart failure  Bradycardia  resolved    Type 2 diabetes mellitus  Hypoglycemia protocol  Accu-Cheks before meals and at bedtime  Insulin sliding scale  Hemoglobin A1c 8.1 on January 1, 2025    Disposition    Anticipated discharge to SNF.   Will need vascular and nephrology final recommendation  Likely ready for discharge in 1-2 days pending recommendations      Jose Cisneros, APRN-CNP

## 2025-01-19 VITALS
HEIGHT: 66 IN | SYSTOLIC BLOOD PRESSURE: 114 MMHG | BODY MASS INDEX: 23.14 KG/M2 | WEIGHT: 143.96 LBS | DIASTOLIC BLOOD PRESSURE: 65 MMHG | OXYGEN SATURATION: 97 % | HEART RATE: 99 BPM | RESPIRATION RATE: 25 BRPM | TEMPERATURE: 98.2 F

## 2025-01-19 LAB
ALBUMIN SERPL BCP-MCNC: 2.6 G/DL (ref 3.4–5)
ANION GAP SERPL CALCULATED.3IONS-SCNC: 10 MMOL/L (ref 10–20)
BASOPHILS # BLD AUTO: 0.07 X10*3/UL (ref 0–0.1)
BASOPHILS NFR BLD AUTO: 0.6 %
BUN SERPL-MCNC: 30 MG/DL (ref 6–23)
CALCIUM SERPL-MCNC: 7.3 MG/DL (ref 8.6–10.3)
CHLORIDE SERPL-SCNC: 97 MMOL/L (ref 98–107)
CO2 SERPL-SCNC: 30 MMOL/L (ref 21–32)
CREAT SERPL-MCNC: 3.12 MG/DL (ref 0.5–1.05)
EGFRCR SERPLBLD CKD-EPI 2021: 16 ML/MIN/1.73M*2
EOSINOPHIL # BLD AUTO: 0.06 X10*3/UL (ref 0–0.7)
EOSINOPHIL NFR BLD AUTO: 0.5 %
ERYTHROCYTE [DISTWIDTH] IN BLOOD BY AUTOMATED COUNT: 13.5 % (ref 11.5–14.5)
GLUCOSE BLD MANUAL STRIP-MCNC: 135 MG/DL (ref 74–99)
GLUCOSE BLD MANUAL STRIP-MCNC: 158 MG/DL (ref 74–99)
GLUCOSE BLD MANUAL STRIP-MCNC: 162 MG/DL (ref 74–99)
GLUCOSE SERPL-MCNC: 185 MG/DL (ref 74–99)
HCT VFR BLD AUTO: 27.3 % (ref 36–46)
HGB BLD-MCNC: 8.4 G/DL (ref 12–16)
IMM GRANULOCYTES # BLD AUTO: 0.2 X10*3/UL (ref 0–0.7)
IMM GRANULOCYTES NFR BLD AUTO: 1.8 % (ref 0–0.9)
LYMPHOCYTES # BLD AUTO: 1.01 X10*3/UL (ref 1.2–4.8)
LYMPHOCYTES NFR BLD AUTO: 8.9 %
MAGNESIUM SERPL-MCNC: 1.77 MG/DL (ref 1.6–2.4)
MCH RBC QN AUTO: 30.8 PG (ref 26–34)
MCHC RBC AUTO-ENTMCNC: 30.8 G/DL (ref 32–36)
MCV RBC AUTO: 100 FL (ref 80–100)
MONOCYTES # BLD AUTO: 0.73 X10*3/UL (ref 0.1–1)
MONOCYTES NFR BLD AUTO: 6.4 %
NEUTROPHILS # BLD AUTO: 9.26 X10*3/UL (ref 1.2–7.7)
NEUTROPHILS NFR BLD AUTO: 81.8 %
NRBC BLD-RTO: 0 /100 WBCS (ref 0–0)
PHOSPHATE SERPL-MCNC: 2.2 MG/DL (ref 2.5–4.9)
PLATELET # BLD AUTO: 179 X10*3/UL (ref 150–450)
POTASSIUM SERPL-SCNC: 4.1 MMOL/L (ref 3.5–5.3)
RBC # BLD AUTO: 2.73 X10*6/UL (ref 4–5.2)
SODIUM SERPL-SCNC: 133 MMOL/L (ref 136–145)
WBC # BLD AUTO: 11.3 X10*3/UL (ref 4.4–11.3)

## 2025-01-19 PROCEDURE — 94668 MNPJ CHEST WALL SBSQ: CPT

## 2025-01-19 PROCEDURE — 9420000001 HC RT PATIENT EDUCATION 5 MIN

## 2025-01-19 PROCEDURE — 2500000001 HC RX 250 WO HCPCS SELF ADMINISTERED DRUGS (ALT 637 FOR MEDICARE OP)

## 2025-01-19 PROCEDURE — 2500000005 HC RX 250 GENERAL PHARMACY W/O HCPCS

## 2025-01-19 PROCEDURE — 2500000002 HC RX 250 W HCPCS SELF ADMINISTERED DRUGS (ALT 637 FOR MEDICARE OP, ALT 636 FOR OP/ED)

## 2025-01-19 PROCEDURE — 80069 RENAL FUNCTION PANEL: CPT

## 2025-01-19 PROCEDURE — 94640 AIRWAY INHALATION TREATMENT: CPT

## 2025-01-19 PROCEDURE — 85025 COMPLETE CBC W/AUTO DIFF WBC: CPT

## 2025-01-19 PROCEDURE — 2500000005 HC RX 250 GENERAL PHARMACY W/O HCPCS: Performed by: INTERNAL MEDICINE

## 2025-01-19 PROCEDURE — 36415 COLL VENOUS BLD VENIPUNCTURE: CPT

## 2025-01-19 PROCEDURE — 2500000004 HC RX 250 GENERAL PHARMACY W/ HCPCS (ALT 636 FOR OP/ED)

## 2025-01-19 PROCEDURE — 82947 ASSAY GLUCOSE BLOOD QUANT: CPT

## 2025-01-19 PROCEDURE — 83735 ASSAY OF MAGNESIUM: CPT

## 2025-01-19 PROCEDURE — 99232 SBSQ HOSP IP/OBS MODERATE 35: CPT | Performed by: NURSE PRACTITIONER

## 2025-01-19 PROCEDURE — 2060000001 HC INTERMEDIATE ICU ROOM DAILY

## 2025-01-19 PROCEDURE — 2500000002 HC RX 250 W HCPCS SELF ADMINISTERED DRUGS (ALT 637 FOR MEDICARE OP, ALT 636 FOR OP/ED): Performed by: INTERNAL MEDICINE

## 2025-01-19 RX ORDER — IPRATROPIUM BROMIDE AND ALBUTEROL SULFATE 2.5; .5 MG/3ML; MG/3ML
3 SOLUTION RESPIRATORY (INHALATION)
Status: DISCONTINUED | OUTPATIENT
Start: 2025-01-19 | End: 2025-01-21 | Stop reason: HOSPADM

## 2025-01-19 RX ORDER — SODIUM CHLORIDE FOR INHALATION 3 %
3 VIAL, NEBULIZER (ML) INHALATION
Status: DISCONTINUED | OUTPATIENT
Start: 2025-01-19 | End: 2025-01-21 | Stop reason: HOSPADM

## 2025-01-19 RX ADMIN — LEVOTHYROXINE SODIUM 200 MCG: 0.1 TABLET ORAL at 06:09

## 2025-01-19 RX ADMIN — ASPIRIN 81 MG: 81 TABLET, COATED ORAL at 09:15

## 2025-01-19 RX ADMIN — ATORVASTATIN CALCIUM 40 MG: 40 TABLET, FILM COATED ORAL at 21:58

## 2025-01-19 RX ADMIN — INSULIN LISPRO 1 UNITS: 100 INJECTION, SOLUTION INTRAVENOUS; SUBCUTANEOUS at 16:52

## 2025-01-19 RX ADMIN — METOPROLOL TARTRATE 25 MG: 25 TABLET, FILM COATED ORAL at 21:58

## 2025-01-19 RX ADMIN — Medication 3 ML: at 12:24

## 2025-01-19 RX ADMIN — Medication 3 L/MIN: at 07:33

## 2025-01-19 RX ADMIN — Medication 3 ML: at 20:04

## 2025-01-19 RX ADMIN — HEPARIN SODIUM 5000 UNITS: 5000 INJECTION, SOLUTION INTRAVENOUS; SUBCUTANEOUS at 06:09

## 2025-01-19 RX ADMIN — GUAIFENESIN AND DEXTROMETHORPHAN 5 ML: 10; 100 SYRUP ORAL at 09:20

## 2025-01-19 RX ADMIN — LEVOTHYROXINE SODIUM 50 MCG: 0.05 TABLET ORAL at 06:15

## 2025-01-19 RX ADMIN — PANTOPRAZOLE SODIUM 40 MG: 40 INJECTION, POWDER, FOR SOLUTION INTRAVENOUS at 09:35

## 2025-01-19 RX ADMIN — ACETAMINOPHEN 650 MG: 325 TABLET ORAL at 22:08

## 2025-01-19 RX ADMIN — Medication 2 L/MIN: at 20:07

## 2025-01-19 RX ADMIN — IPRATROPIUM BROMIDE AND ALBUTEROL SULFATE 3 ML: 2.5; .5 SOLUTION RESPIRATORY (INHALATION) at 20:04

## 2025-01-19 RX ADMIN — IPRATROPIUM BROMIDE AND ALBUTEROL SULFATE 3 ML: 2.5; .5 SOLUTION RESPIRATORY (INHALATION) at 07:31

## 2025-01-19 RX ADMIN — VENLAFAXINE HYDROCHLORIDE 150 MG: 150 CAPSULE, EXTENDED RELEASE ORAL at 09:12

## 2025-01-19 RX ADMIN — IPRATROPIUM BROMIDE AND ALBUTEROL SULFATE 3 ML: 2.5; .5 SOLUTION RESPIRATORY (INHALATION) at 12:24

## 2025-01-19 RX ADMIN — INSULIN LISPRO 1 UNITS: 100 INJECTION, SOLUTION INTRAVENOUS; SUBCUTANEOUS at 09:27

## 2025-01-19 RX ADMIN — AMIODARONE HYDROCHLORIDE 200 MG: 200 TABLET ORAL at 09:15

## 2025-01-19 RX ADMIN — HEPARIN SODIUM 5000 UNITS: 5000 INJECTION, SOLUTION INTRAVENOUS; SUBCUTANEOUS at 16:57

## 2025-01-19 RX ADMIN — PANTOPRAZOLE SODIUM 40 MG: 40 INJECTION, POWDER, FOR SOLUTION INTRAVENOUS at 21:58

## 2025-01-19 RX ADMIN — METOPROLOL TARTRATE 25 MG: 25 TABLET, FILM COATED ORAL at 09:13

## 2025-01-19 RX ADMIN — HEPARIN SODIUM 5000 UNITS: 5000 INJECTION, SOLUTION INTRAVENOUS; SUBCUTANEOUS at 21:58

## 2025-01-19 RX ADMIN — PROCHLORPERAZINE EDISYLATE 10 MG: 5 INJECTION INTRAMUSCULAR; INTRAVENOUS at 16:44

## 2025-01-19 RX ADMIN — Medication 3 ML: at 07:31

## 2025-01-19 ASSESSMENT — COGNITIVE AND FUNCTIONAL STATUS - GENERAL
DRESSING REGULAR LOWER BODY CLOTHING: A LOT
CLIMB 3 TO 5 STEPS WITH RAILING: A LOT
MOBILITY SCORE: 16
DRESSING REGULAR UPPER BODY CLOTHING: A LOT
TOILETING: A LOT
MOVING TO AND FROM BED TO CHAIR: A LITTLE
MOVING FROM LYING ON BACK TO SITTING ON SIDE OF FLAT BED WITH BEDRAILS: A LITTLE
PERSONAL GROOMING: A LOT
DAILY ACTIVITIY SCORE: 14
STANDING UP FROM CHAIR USING ARMS: A LITTLE
TURNING FROM BACK TO SIDE WHILE IN FLAT BAD: A LITTLE
HELP NEEDED FOR BATHING: A LOT
WALKING IN HOSPITAL ROOM: A LOT

## 2025-01-19 ASSESSMENT — PAIN - FUNCTIONAL ASSESSMENT
PAIN_FUNCTIONAL_ASSESSMENT: 0-10
PAIN_FUNCTIONAL_ASSESSMENT: FLACC (FACE, LEGS, ACTIVITY, CRY, CONSOLABILITY)
PAIN_FUNCTIONAL_ASSESSMENT: 0-10

## 2025-01-19 ASSESSMENT — PAIN SCALES - GENERAL
PAINLEVEL_OUTOF10: 0 - NO PAIN
PAINLEVEL_OUTOF10: 9
PAINLEVEL_OUTOF10: 0 - NO PAIN

## 2025-01-19 ASSESSMENT — PAIN DESCRIPTION - DESCRIPTORS: DESCRIPTORS: SHARP;SHOOTING

## 2025-01-19 NOTE — CARE PLAN
Patient last dialyzed on Friday.  Typically Tuesday Thursday Saturday, will plan for dialysis tomorrow and probably go Monday Thursday Saturday this week.

## 2025-01-19 NOTE — PROGRESS NOTES
Daphne Morris is a 69 y.o. female on day 9 of admission presenting with Clotted renal dialysis AV graft (CMS-HCC).      Subjective   Patient seen and examined. Patient is doing well, on home O2 3 L NC, now new c/o. Tolerating BIPAP at night.   Objective     Last Recorded Vitals  /53   Pulse 92   Temp 36.7 °C (98.1 °F) (Temporal)   Resp 21   Wt 65.3 kg (143 lb 15.4 oz)   SpO2 94%   Intake/Output last 3 Shifts:    Intake/Output Summary (Last 24 hours) at 1/19/2025 1319  Last data filed at 1/19/2025 1027  Gross per 24 hour   Intake 180 ml   Output 0 ml   Net 180 ml       Admission Weight  Weight: 70 kg (154 lb 5.2 oz) (01/11/25 0537)    Daily Weight  01/19/25 : 65.3 kg (143 lb 15.4 oz)          Physical Exam  Vitals reviewed.   Constitutional:       Appearance: She is ill-appearing.   HENT:      Head: Normocephalic.      Nose: Nose normal.   Eyes:      Extraocular Movements: Extraocular movements intact.   Cardiovascular:      Rate and Rhythm: Regular rhythm.   Pulmonary:      Effort: Pulmonary effort is normal.   Abdominal:      General: Bowel sounds are normal.   Musculoskeletal:      Cervical back: Neck supple.      Comments: Right UE dialysis access    Skin:     General: Skin is warm.   Neurological:      Mental Status: She is alert.      Comments: AOx3         Assessment/Plan       This patient has a central line   Reason for the central line remaining today? Dialysis/Hemapheresis    Daphne Morris is a 69 year old female patient with pmhx of ESRD, anemia, afib, chf, copd, CVA, depression dm, chronic hypoxic respiratory failure on 3L O2 at home, gerd hyperlipidemia, hypertension, hypothyroid, renal cell carcinoma s/p resection in 8/21 and vascultitis. Patient was admitted 1/9 at [facility] for clotted AV fistula and transferred to [facility] to be seen by vascular surgery. Patient s/p unsuccessful right graft thrombectomy. Patient had  tunneled cath placement by IR.  Patient on arrival form IR was lethargic  and not responding well. ABG was ordered showing hypercapnic respiratory acidosis with hypoxemia, patient was placed on bipap and transferred to ICU.     Assessment & Plan  Clotted renal dialysis AV graft (CMS-HCC)  Plavix on hold  Vascular surgery/nephrology are following  Tunneled catheter placed 1/15    ESRD (end stage renal disease) (Multi)  Followed by nephrology (Dr. Mitchell)  Dialysis days are Tuesday, Thursday, and Saturday    Respiratory acidosis with acute on chronic hypoxic respiratory failure  Much improved has been transferred out of ICU  Patient at baseline on 3 L of nasal cannula with Bipap at night    Metabolic encephalopathy  resolved  Monitor closely    Hypertension  Continue home amlodipine    Atrial fibrillation  Not on anticoagulation  S/p Watchman 2023  SDU/telemetry  Continue with BB and amiodarone     Hyperlipidemia  Continue home atorvastatin    Hypothyroid  Continue home levothyroxine    Chronic diastolic heart failure  Bradycardia  resolved    Type 2 diabetes mellitus  Hypoglycemia protocol  Accu-Cheks before meals and at bedtime  Insulin sliding scale  Hemoglobin A1c 8.1 on January 1, 2025    Disposition    Anticipated discharge to SNF.   Will need vascular and nephrology final recommendation  Likely ready for discharge in 1-2 days pending recommendations      Jose Cisneros, APRN-CNP

## 2025-01-19 NOTE — SIGNIFICANT EVENT
The patient was placed on RA this morning. She is back to her home 02 regiment, 3L Nasal Cannula HS

## 2025-01-19 NOTE — CARE PLAN
The patient's goals for the shift include rest    The clinical goals for the shift include remain hemodynaically stable

## 2025-01-19 NOTE — CARE PLAN
The patient's goals for the shift include      The clinical goals for the shift include toremain hemodynamicaly stable

## 2025-01-20 ENCOUNTER — APPOINTMENT (OUTPATIENT)
Dept: DIALYSIS | Facility: HOSPITAL | Age: 70
End: 2025-01-20
Payer: MEDICARE

## 2025-01-20 LAB
ALBUMIN SERPL BCP-MCNC: 2.7 G/DL (ref 3.4–5)
ANION GAP SERPL CALCULATED.3IONS-SCNC: 11 MMOL/L (ref 10–20)
BASOPHILS # BLD AUTO: 0.09 X10*3/UL (ref 0–0.1)
BASOPHILS NFR BLD AUTO: 0.9 %
BUN SERPL-MCNC: 40 MG/DL (ref 6–23)
CALCIUM SERPL-MCNC: 7.7 MG/DL (ref 8.6–10.3)
CHLORIDE SERPL-SCNC: 97 MMOL/L (ref 98–107)
CO2 SERPL-SCNC: 29 MMOL/L (ref 21–32)
CREAT SERPL-MCNC: 3.54 MG/DL (ref 0.5–1.05)
EGFRCR SERPLBLD CKD-EPI 2021: 13 ML/MIN/1.73M*2
EOSINOPHIL # BLD AUTO: 0.08 X10*3/UL (ref 0–0.7)
EOSINOPHIL NFR BLD AUTO: 0.8 %
ERYTHROCYTE [DISTWIDTH] IN BLOOD BY AUTOMATED COUNT: 13.5 % (ref 11.5–14.5)
GLUCOSE SERPL-MCNC: 143 MG/DL (ref 74–99)
HCT VFR BLD AUTO: 28.9 % (ref 36–46)
HGB BLD-MCNC: 8.9 G/DL (ref 12–16)
IMM GRANULOCYTES # BLD AUTO: 0.25 X10*3/UL (ref 0–0.7)
IMM GRANULOCYTES NFR BLD AUTO: 2.6 % (ref 0–0.9)
LYMPHOCYTES # BLD AUTO: 1.4 X10*3/UL (ref 1.2–4.8)
LYMPHOCYTES NFR BLD AUTO: 14.5 %
MAGNESIUM SERPL-MCNC: 1.8 MG/DL (ref 1.6–2.4)
MCH RBC QN AUTO: 30.7 PG (ref 26–34)
MCHC RBC AUTO-ENTMCNC: 30.8 G/DL (ref 32–36)
MCV RBC AUTO: 100 FL (ref 80–100)
MONOCYTES # BLD AUTO: 0.78 X10*3/UL (ref 0.1–1)
MONOCYTES NFR BLD AUTO: 8.1 %
NEUTROPHILS # BLD AUTO: 7.05 X10*3/UL (ref 1.2–7.7)
NEUTROPHILS NFR BLD AUTO: 73.1 %
NRBC BLD-RTO: 0 /100 WBCS (ref 0–0)
PHOSPHATE SERPL-MCNC: 2.3 MG/DL (ref 2.5–4.9)
PLATELET # BLD AUTO: 238 X10*3/UL (ref 150–450)
POTASSIUM SERPL-SCNC: 4.4 MMOL/L (ref 3.5–5.3)
RBC # BLD AUTO: 2.9 X10*6/UL (ref 4–5.2)
SODIUM SERPL-SCNC: 133 MMOL/L (ref 136–145)
WBC # BLD AUTO: 9.7 X10*3/UL (ref 4.4–11.3)

## 2025-01-20 PROCEDURE — 2500000001 HC RX 250 WO HCPCS SELF ADMINISTERED DRUGS (ALT 637 FOR MEDICARE OP)

## 2025-01-20 PROCEDURE — 6350000001 HC RX 635 EPOETIN >10,000 UNITS

## 2025-01-20 PROCEDURE — 84100 ASSAY OF PHOSPHORUS: CPT

## 2025-01-20 PROCEDURE — 1200000002 HC GENERAL ROOM WITH TELEMETRY DAILY

## 2025-01-20 PROCEDURE — 36415 COLL VENOUS BLD VENIPUNCTURE: CPT

## 2025-01-20 PROCEDURE — 2500000004 HC RX 250 GENERAL PHARMACY W/ HCPCS (ALT 636 FOR OP/ED)

## 2025-01-20 PROCEDURE — 2500000005 HC RX 250 GENERAL PHARMACY W/O HCPCS: Performed by: INTERNAL MEDICINE

## 2025-01-20 PROCEDURE — 8010000001 HC DIALYSIS - HEMODIALYSIS PER DAY

## 2025-01-20 PROCEDURE — 2500000002 HC RX 250 W HCPCS SELF ADMINISTERED DRUGS (ALT 637 FOR MEDICARE OP, ALT 636 FOR OP/ED)

## 2025-01-20 PROCEDURE — 94640 AIRWAY INHALATION TREATMENT: CPT

## 2025-01-20 PROCEDURE — 83735 ASSAY OF MAGNESIUM: CPT

## 2025-01-20 PROCEDURE — 2500000002 HC RX 250 W HCPCS SELF ADMINISTERED DRUGS (ALT 637 FOR MEDICARE OP, ALT 636 FOR OP/ED): Performed by: INTERNAL MEDICINE

## 2025-01-20 PROCEDURE — 94668 MNPJ CHEST WALL SBSQ: CPT

## 2025-01-20 PROCEDURE — 99232 SBSQ HOSP IP/OBS MODERATE 35: CPT | Performed by: NURSE PRACTITIONER

## 2025-01-20 PROCEDURE — 85025 COMPLETE CBC W/AUTO DIFF WBC: CPT

## 2025-01-20 RX ADMIN — AMIODARONE HYDROCHLORIDE 200 MG: 200 TABLET ORAL at 16:18

## 2025-01-20 RX ADMIN — ATORVASTATIN CALCIUM 40 MG: 40 TABLET, FILM COATED ORAL at 23:24

## 2025-01-20 RX ADMIN — PANTOPRAZOLE SODIUM 40 MG: 40 INJECTION, POWDER, FOR SOLUTION INTRAVENOUS at 23:24

## 2025-01-20 RX ADMIN — HEPARIN SODIUM 5000 UNITS: 5000 INJECTION, SOLUTION INTRAVENOUS; SUBCUTANEOUS at 23:24

## 2025-01-20 RX ADMIN — HEPARIN SODIUM 5000 UNITS: 5000 INJECTION, SOLUTION INTRAVENOUS; SUBCUTANEOUS at 15:58

## 2025-01-20 RX ADMIN — IPRATROPIUM BROMIDE AND ALBUTEROL SULFATE 3 ML: 2.5; .5 SOLUTION RESPIRATORY (INHALATION) at 08:55

## 2025-01-20 RX ADMIN — Medication 10 MG: at 23:24

## 2025-01-20 RX ADMIN — Medication 3 ML: at 08:55

## 2025-01-20 RX ADMIN — EPOETIN ALFA-EPBX 5000 UNITS: 20000 INJECTION, SOLUTION INTRAVENOUS; SUBCUTANEOUS at 15:56

## 2025-01-20 RX ADMIN — VENLAFAXINE HYDROCHLORIDE 150 MG: 150 CAPSULE, EXTENDED RELEASE ORAL at 16:20

## 2025-01-20 RX ADMIN — Medication 3 L/MIN: at 22:06

## 2025-01-20 RX ADMIN — ASPIRIN 81 MG: 81 TABLET, COATED ORAL at 16:18

## 2025-01-20 RX ADMIN — METOPROLOL TARTRATE 25 MG: 25 TABLET, FILM COATED ORAL at 23:24

## 2025-01-20 RX ADMIN — LEVOTHYROXINE SODIUM 50 MCG: 0.05 TABLET ORAL at 05:29

## 2025-01-20 RX ADMIN — Medication 3 ML: at 21:57

## 2025-01-20 RX ADMIN — METOPROLOL TARTRATE 25 MG: 25 TABLET, FILM COATED ORAL at 16:19

## 2025-01-20 RX ADMIN — LEVOTHYROXINE SODIUM 200 MCG: 0.1 TABLET ORAL at 05:29

## 2025-01-20 RX ADMIN — HEPARIN SODIUM 5000 UNITS: 5000 INJECTION, SOLUTION INTRAVENOUS; SUBCUTANEOUS at 05:35

## 2025-01-20 RX ADMIN — IPRATROPIUM BROMIDE AND ALBUTEROL SULFATE 3 ML: 2.5; .5 SOLUTION RESPIRATORY (INHALATION) at 22:05

## 2025-01-20 ASSESSMENT — PAIN - FUNCTIONAL ASSESSMENT: PAIN_FUNCTIONAL_ASSESSMENT: 0-10

## 2025-01-20 ASSESSMENT — PAIN SCALES - GENERAL
PAINLEVEL_OUTOF10: 0 - NO PAIN
PAINLEVEL_OUTOF10: 0 - NO PAIN

## 2025-01-20 ASSESSMENT — COGNITIVE AND FUNCTIONAL STATUS - GENERAL
WALKING IN HOSPITAL ROOM: A LOT
DRESSING REGULAR LOWER BODY CLOTHING: A LITTLE
HELP NEEDED FOR BATHING: A LITTLE
TOILETING: A LITTLE
TURNING FROM BACK TO SIDE WHILE IN FLAT BAD: A LITTLE
CLIMB 3 TO 5 STEPS WITH RAILING: A LOT
PERSONAL GROOMING: A LITTLE
MOBILITY SCORE: 16
STANDING UP FROM CHAIR USING ARMS: A LITTLE
DAILY ACTIVITIY SCORE: 19
MOVING TO AND FROM BED TO CHAIR: A LITTLE
MOVING FROM LYING ON BACK TO SITTING ON SIDE OF FLAT BED WITH BEDRAILS: A LITTLE
DRESSING REGULAR UPPER BODY CLOTHING: A LITTLE

## 2025-01-20 NOTE — PROGRESS NOTES
01/20/25 1402   Discharge Planning   Who is requesting discharge planning? Provider   Home or Post Acute Services Post acute facilities (Rehab/SNF/etc)   Type of Post Acute Facility Services Skilled nursing   Expected Discharge Disposition SNF  ([facility], have auth good thru 01/21/25)   Does the patient need discharge transport arranged? Yes   RoundTrip coordination needed? Yes   Has discharge transport been arranged? No     Pt not med ready, vascular following. Pt has auth to [facility] thru [facility]/25. If no dc tomorrow, will need to resubmit for auth. Will need updated PT/OT notes to resubmit precert.     DISCHARGE PLAN TO GO TO [facility]RU 01/21/25.  DO NOT DISCHARGE WITHOUT SPEAKING TO CARE COORDINATION.

## 2025-01-20 NOTE — PROGRESS NOTES
Physical Therapy                 Therapy Communication Note    Patient Name: Daphne Morris  MRN: [id number]  Department: [facility] DIALYSIS  Room: 16/16-A  Today's Date: 1/20/2025     Discipline: Physical Therapy    PT Missed Visit: Yes     Missed Visit Reason: Other (Comment)   (Patient is off of the floor at dialysis.)    Missed Time: Attempt    Comment:

## 2025-01-20 NOTE — PROGRESS NOTES
"Nutrition Follow up Note    Nutrition Assessment      Assessed for kidney transplant, found not to be a candidate. Anticipating discharge to SNF pending consults. PO intake remains good.    Nutrition History:     Energy Intake: Good > 75 %  Food Allergies/Intolerances:   honey  GI Symptoms: None  Oral Problems: None    Anthropometrics:  Ht: 167.6 cm (5' 6\"), Wt: 63.5 kg (139 lb 15.9 oz), BMI: 22.61  IBW/kg (Dietitian Calculated): 59.09 kg     Weight Change:  Daily Weight  01/20/25 : 63.5 kg (139 lb 15.9 oz)  01/09/25 : 70.3 kg (155 lb)  01/02/25 : 70.7 kg (155 lb 13.8 oz)  12/31/24 : 76.4 kg (168 lb 6.9 oz)  12/29/24 : 74.8 kg (165 lb)  12/26/24 : 71.2 kg (157 lb)  12/26/24 : 69.9 kg (154 lb)  12/20/24 : 69.9 kg (154 lb)  11/11/24 : 69.9 kg (154 lb)  09/25/24 : 69.9 kg (154 lb)     Nutrition Focused Physical Exam Findings:      Nutrition Significant Labs:  Lab Results   Component Value Date    WBC 9.7 01/20/2025    HGB 8.9 (L) 01/20/2025    HCT 28.9 (L) 01/20/2025     01/20/2025    CHOL 128 04/27/2023    TRIG 100 04/27/2023    HDL 49.0 04/27/2023    ALT 7 01/09/2025    AST 10 01/09/2025     (L) 01/20/2025    K 4.4 01/20/2025    CL 97 (L) 01/20/2025    CREATININE 3.54 (H) 01/20/2025    BUN 40 (H) 01/20/2025    CO2 29 01/20/2025    TSH 2.60 01/10/2025    INR 1.0 12/26/2024    HGBA1C 8.8 (H) 01/01/2025    ALBUR 1,714 (H) 07/15/2020     Nutrition Specific Medications:  amiodarone, 200 mg, oral, Daily with breakfast  [Held by provider] amLODIPine, 5 mg, oral, Daily  aspirin, 81 mg, oral, Daily  atorvastatin, 40 mg, oral, Nightly  [Held by provider] clopidogrel, 75 mg, oral, Daily  epoetin debra or biosimilar, 5,000 Units, subcutaneous, Every Mon/Wed/Fri  heparin (porcine), 5,000 Units, subcutaneous, q8h SUMIT  heparin, 2,000 Units, intra-catheter, After Dialysis  heparin, 2,000 Units, intra-catheter, After Dialysis  heparin, 2,000 Units, intra-catheter, After Dialysis  insulin lispro, 0-5 Units, subcutaneous, " TID AC  ipratropium-albuteroL, 3 mL, nebulization, TID  levothyroxine, 200 mcg, oral, Daily  levothyroxine, 50 mcg, oral, Daily  metoprolol tartrate, 25 mg, oral, BID  oxygen, , inhalation, Nightly  pantoprazole, 40 mg, intravenous, BID  sodium chloride, 3 mL, nebulization, TID  venlafaxine XR, 150 mg, oral, Daily         Dietary Orders (From admission, onward)       Start     Ordered    01/15/25 2107  Adult diet Regular, Renal; Potassium Restricted 2 gm (50mEq); 2 - 3 grams Sodium  Diet effective now        Question Answer Comment   Diet type Regular    Diet type Renal    Potassium restriction: Potassium Restricted 2 gm (50mEq)    Sodium restriction: 2 - 3 grams Sodium        01/15/25 2107    01/10/25 1438  Oral nutritional supplements  Until discontinued        Question Answer Comment   Deliver with Breakfast    Deliver with Dinner    Select supplement: [place]        01/10/25 1437    01/10/25 1438  Oral nutritional supplements  Until discontinued        Question Answer Comment   Deliver with All meals    Select supplement: Sugar Free Mighty Shake        01/10/25 1437    01/10/25 1325  May Participate in Room Service  ( ROOM SERVICE MAY PARTICIPATE)  Once        Question:  .  Answer:  Yes    01/10/25 1324                   Estimated Needs:   Estimated Energy Needs  Total Energy Estimated Needs (kCal): 2109 kCal  Total Estimated Energy Need per Day (kCal/kg): 30 kCal/kg  Method for Estimating Needs: actual wt    Estimated Protein Needs  Total Protein Estimated Needs (g):  (84-91)  Total Protein Estimated Needs (g/kg):  (1.2-1.3)  Method for Estimating Needs: actual wt    Estimated Fluid Needs  Method for Estimating Needs: urine output + 500-1000 mL/day      Nutrition Diagnosis   Nutrition Diagnosis:     Nutrition Diagnosis  Patient has Nutrition Diagnosis: Yes  Diagnosis Status (1): Ongoing  Nutrition Diagnosis 1: Increased nutrient needs  Related to (1): increased demand for nutrient  As Evidenced by (1):  dialysis, wounds     Nutrition Interventions/Recommendations   Nutrition Interventions and Recommendations:    Nutrition Prescription:  Individualized Nutrition Prescription Provided for : 2109 kcals, 84-91 g protein via diet    Nutrition Interventions:   Food and/or Nutrient Delivery Interventions  Interventions: Meals and snacks  Meals and Snacks: Protein-modified diet, Mineral-modified diet  Goal: provide as ordered  Medical Food Supplement: Commercial beverage  Goal: andre BID to aid in wound healing; mighty shake TID to provide 200 kcals and 7g protein each    Education Documentation  No documentation found.         Nutrition Monitoring and Evaluation   Monitoring/Evaluation:   Food/Nutrient Related History Monitoring  Monitoring and Evaluation Plan: Energy intake  Energy Intake: Estimated energy intake  Criteria: pt to consume >/= 75% estimated needs    Nutrition Focused Physical Findings  Monitoring and Evaluation Plan: Skin  Skin: Impaired wound healing  Criteria: pt will show signs of improvement in skin integrity       Time Spent/Follow-up:   Follow Up  Time Spent (min): 20 minutes  Last Date of Nutrition Visit: 01/20/25  Nutrition Follow-Up Needed?: 7-10 days  Follow up Comment: 1/27/25

## 2025-01-20 NOTE — PROGRESS NOTES
CONSULT PROGRESS NOTES    SERVICE DATE: 1/20/2025   SERVICE TIME: 11:50 AM    CONSULTING SERVICE: Nephrology    ASSESSMENT AND PLAN   69-year-old woman in on dialysis admitted with clotted arteriovenous graft, now alteration in mental status.  1.  End-stage renal disease  2.  Malfunctioning AVG  3.  Edema  4.  Fluid overload  5.  Essential hypertension  6.  Anemia of chronic kidney disease     Status post stent placement, now the AVG does not seem to be working well on dialysis, though I hear a soft bruit.  Status post placement of tunneled hemodialysis catheter.  Needs outpatient vascular evaluation.  Will increase the ultrafiltration today during a 3.5-hour hemodialysis treatment.  Erythropoietin stimulating agents to be given with dialysis.  There is no need for daily routine chemistry in this dialysis patient.  I will follow her.  No objection to discharge when arrangements are made.  Anticipate next hemodialysis will be on Thursday in accordance with the regular schedule.    SUBJECTIVE  INTERVAL HPI: She has no dyspnea.  She generally feels okay.  She wants to be discharged.  Using her right internal jugular tunneled hemodialysis catheter on hemodialysis today.  She understands that she is off schedule.    MEDICATIONS:  amiodarone, 200 mg, oral, Daily with breakfast  [Held by provider] amLODIPine, 5 mg, oral, Daily  aspirin, 81 mg, oral, Daily  atorvastatin, 40 mg, oral, Nightly  [Held by provider] clopidogrel, 75 mg, oral, Daily  epoetin debra or biosimilar, 5,000 Units, subcutaneous, Every Mon/Wed/Fri  heparin (porcine), 5,000 Units, subcutaneous, q8h SUMIT  heparin, 2,000 Units, intra-catheter, After Dialysis  heparin, 2,000 Units, intra-catheter, After Dialysis  heparin, 2,000 Units, intra-catheter, After Dialysis  insulin lispro, 0-5 Units, subcutaneous, TID AC  ipratropium-albuteroL, 3 mL, nebulization, TID  levothyroxine, 200 mcg, oral, Daily  levothyroxine, 50 mcg, oral, Daily  metoprolol tartrate, 25 mg,  oral, BID  oxygen, , inhalation, Nightly  pantoprazole, 40 mg, intravenous, BID  sodium chloride, 3 mL, nebulization, TID  venlafaxine XR, 150 mg, oral, Daily             PRN medications: acetaminophen **OR** acetaminophen **OR** acetaminophen, albuterol, benzocaine-menthol, bisacodyl, dextromethorphan-guaifenesin, dextrose, dextrose, glucagon, glucagon, guaiFENesin, melatonin, oxygen, polyethylene glycol, prochlorperazine **OR** prochlorperazine **OR** prochlorperazine     OBJECTIVE  PHYSICAL EXAM:   Heart Rate:  []   Temp:  [36 °C (96.8 °F)-36.8 °C (98.2 °F)]   Resp:  [17-26]   BP: (103-132)/(53-65)   Weight:  [63.5 kg (139 lb 15.9 oz)]   SpO2:  [86 %-100 %]   Body mass index is 22.6 kg/m².  This is a chronically ill-appearing woman, seems much older than her known age  Very pale skin  Hearing intact  Phonation intact  Moist mucosa  Normal S1/normal S2  Lungs are clear anteriorly, increased work of breathing  Abdomen is soft, nondistended, nontender, positive bowel sounds  No Fay catheter in place, no suprapubic tenderness to palpation  Trace bilateral lower extremity edema  She has a right upper extremity graft and has an extensive bandage over it with significant blood on the bandage.  There is a bruit, but there is no thrill  Moves 4 limbs spontaneously  No obvious joint deformities  No lymphadenopathy  Right internal jugular tunneled hemodialysis catheter in place    DATA:   Labs:  Results for orders placed or performed during the hospital encounter of 01/10/25 (from the past 96 hours)   POCT GLUCOSE   Result Value Ref Range    POCT Glucose 196 (H) 74 - 99 mg/dL   POCT GLUCOSE   Result Value Ref Range    POCT Glucose 224 (H) 74 - 99 mg/dL   Blood Gas Arterial Full Panel   Result Value Ref Range    POCT pH, Arterial 7.41 7.38 - 7.42 pH    POCT pCO2, Arterial 49 (H) 38 - 42 mm Hg    POCT pO2, Arterial 68 (L) 85 - 95 mm Hg    POCT SO2, Arterial 95 94 - 100 %    POCT Oxy Hemoglobin, Arterial 94.2 94.0 -  98.0 %    POCT Hematocrit Calculated, Arterial 26.0 (L) 36.0 - 46.0 %    POCT Sodium, Arterial 132 (L) 136 - 145 mmol/L    POCT Potassium, Arterial 3.9 3.5 - 5.3 mmol/L    POCT Chloride, Arterial 99 98 - 107 mmol/L    POCT Ionized Calcium, Arterial 1.17 1.10 - 1.33 mmol/L    POCT Glucose, Arterial 117 (H) 74 - 99 mg/dL    POCT Lactate, Arterial 0.5 0.4 - 2.0 mmol/L    POCT Base Excess, Arterial 5.7 (H) -2.0 - 3.0 mmol/L    POCT HCO3 Calculated, Arterial 31.1 (H) 22.0 - 26.0 mmol/L    POCT Hemoglobin, Arterial 8.6 (L) 12.0 - 16.0 g/dL    POCT Anion Gap, Arterial 6 (L) 10 - 25 mmo/L    Patient Temperature 37.0 degrees Celsius    FiO2 28 %    Apparatus CANNULA     Flow 28.0 LPM    Site of Arterial Puncture Radial Left     Jonathan's Test Positive    CBC and Auto Differential   Result Value Ref Range    WBC 10.0 4.4 - 11.3 x10*3/uL    nRBC 0.0 0.0 - 0.0 /100 WBCs    RBC 2.90 (L) 4.00 - 5.20 x10*6/uL    Hemoglobin 8.9 (L) 12.0 - 16.0 g/dL    Hematocrit 28.5 (L) 36.0 - 46.0 %    MCV 98 80 - 100 fL    MCH 30.7 26.0 - 34.0 pg    MCHC 31.2 (L) 32.0 - 36.0 g/dL    RDW 13.6 11.5 - 14.5 %    Platelets 152 150 - 450 x10*3/uL    Neutrophils % 82.9 40.0 - 80.0 %    Immature Granulocytes %, Automated 1.3 (H) 0.0 - 0.9 %    Lymphocytes % 8.9 13.0 - 44.0 %    Monocytes % 5.9 2.0 - 10.0 %    Eosinophils % 0.7 0.0 - 6.0 %    Basophils % 0.3 0.0 - 2.0 %    Neutrophils Absolute 8.24 (H) 1.20 - 7.70 x10*3/uL    Immature Granulocytes Absolute, Automated 0.13 0.00 - 0.70 x10*3/uL    Lymphocytes Absolute 0.89 (L) 1.20 - 4.80 x10*3/uL    Monocytes Absolute 0.59 0.10 - 1.00 x10*3/uL    Eosinophils Absolute 0.07 0.00 - 0.70 x10*3/uL    Basophils Absolute 0.03 0.00 - 0.10 x10*3/uL   Renal function panel   Result Value Ref Range    Glucose 116 (H) 74 - 99 mg/dL    Sodium 134 (L) 136 - 145 mmol/L    Potassium 3.7 3.5 - 5.3 mmol/L    Chloride 97 (L) 98 - 107 mmol/L    Bicarbonate 29 21 - 32 mmol/L    Anion Gap 12 10 - 20 mmol/L    Urea Nitrogen 24 (H)  6 - 23 mg/dL    Creatinine 2.68 (H) 0.50 - 1.05 mg/dL    eGFR 19 (L) >60 mL/min/1.73m*2    Calcium 8.0 (L) 8.6 - 10.3 mg/dL    Phosphorus 2.5 2.5 - 4.9 mg/dL    Albumin 2.7 (L) 3.4 - 5.0 g/dL   Magnesium   Result Value Ref Range    Magnesium 2.21 1.60 - 2.40 mg/dL   POCT GLUCOSE   Result Value Ref Range    POCT Glucose 182 (H) 74 - 99 mg/dL   POCT GLUCOSE   Result Value Ref Range    POCT Glucose 181 (H) 74 - 99 mg/dL   POCT GLUCOSE   Result Value Ref Range    POCT Glucose 177 (H) 74 - 99 mg/dL   POCT GLUCOSE   Result Value Ref Range    POCT Glucose 239 (H) 74 - 99 mg/dL   CBC and Auto Differential   Result Value Ref Range    WBC 10.4 4.4 - 11.3 x10*3/uL    nRBC 0.0 0.0 - 0.0 /100 WBCs    RBC 2.68 (L) 4.00 - 5.20 x10*6/uL    Hemoglobin 8.2 (L) 12.0 - 16.0 g/dL    Hematocrit 26.8 (L) 36.0 - 46.0 %     80 - 100 fL    MCH 30.6 26.0 - 34.0 pg    MCHC 30.6 (L) 32.0 - 36.0 g/dL    RDW 13.6 11.5 - 14.5 %    Platelets 156 150 - 450 x10*3/uL    Neutrophils % 78.0 40.0 - 80.0 %    Immature Granulocytes %, Automated 1.9 (H) 0.0 - 0.9 %    Lymphocytes % 11.1 13.0 - 44.0 %    Monocytes % 7.8 2.0 - 10.0 %    Eosinophils % 0.7 0.0 - 6.0 %    Basophils % 0.5 0.0 - 2.0 %    Neutrophils Absolute 8.11 (H) 1.20 - 7.70 x10*3/uL    Immature Granulocytes Absolute, Automated 0.20 0.00 - 0.70 x10*3/uL    Lymphocytes Absolute 1.15 (L) 1.20 - 4.80 x10*3/uL    Monocytes Absolute 0.81 0.10 - 1.00 x10*3/uL    Eosinophils Absolute 0.07 0.00 - 0.70 x10*3/uL    Basophils Absolute 0.05 0.00 - 0.10 x10*3/uL   Renal function panel   Result Value Ref Range    Glucose 143 (H) 74 - 99 mg/dL    Sodium 135 (L) 136 - 145 mmol/L    Potassium 4.0 3.5 - 5.3 mmol/L    Chloride 98 98 - 107 mmol/L    Bicarbonate 31 21 - 32 mmol/L    Anion Gap 10 10 - 20 mmol/L    Urea Nitrogen 18 6 - 23 mg/dL    Creatinine 2.29 (H) 0.50 - 1.05 mg/dL    eGFR 22 (L) >60 mL/min/1.73m*2    Calcium 7.4 (L) 8.6 - 10.3 mg/dL    Phosphorus 1.8 (L) 2.5 - 4.9 mg/dL    Albumin 2.6 (L)  3.4 - 5.0 g/dL   Magnesium   Result Value Ref Range    Magnesium 1.85 1.60 - 2.40 mg/dL   POCT GLUCOSE   Result Value Ref Range    POCT Glucose 217 (H) 74 - 99 mg/dL   POCT GLUCOSE   Result Value Ref Range    POCT Glucose 188 (H) 74 - 99 mg/dL   CBC and Auto Differential   Result Value Ref Range    WBC 11.3 4.4 - 11.3 x10*3/uL    nRBC 0.0 0.0 - 0.0 /100 WBCs    RBC 2.73 (L) 4.00 - 5.20 x10*6/uL    Hemoglobin 8.4 (L) 12.0 - 16.0 g/dL    Hematocrit 27.3 (L) 36.0 - 46.0 %     80 - 100 fL    MCH 30.8 26.0 - 34.0 pg    MCHC 30.8 (L) 32.0 - 36.0 g/dL    RDW 13.5 11.5 - 14.5 %    Platelets 179 150 - 450 x10*3/uL    Neutrophils % 81.8 40.0 - 80.0 %    Immature Granulocytes %, Automated 1.8 (H) 0.0 - 0.9 %    Lymphocytes % 8.9 13.0 - 44.0 %    Monocytes % 6.4 2.0 - 10.0 %    Eosinophils % 0.5 0.0 - 6.0 %    Basophils % 0.6 0.0 - 2.0 %    Neutrophils Absolute 9.26 (H) 1.20 - 7.70 x10*3/uL    Immature Granulocytes Absolute, Automated 0.20 0.00 - 0.70 x10*3/uL    Lymphocytes Absolute 1.01 (L) 1.20 - 4.80 x10*3/uL    Monocytes Absolute 0.73 0.10 - 1.00 x10*3/uL    Eosinophils Absolute 0.06 0.00 - 0.70 x10*3/uL    Basophils Absolute 0.07 0.00 - 0.10 x10*3/uL   Renal function panel   Result Value Ref Range    Glucose 185 (H) 74 - 99 mg/dL    Sodium 133 (L) 136 - 145 mmol/L    Potassium 4.1 3.5 - 5.3 mmol/L    Chloride 97 (L) 98 - 107 mmol/L    Bicarbonate 30 21 - 32 mmol/L    Anion Gap 10 10 - 20 mmol/L    Urea Nitrogen 30 (H) 6 - 23 mg/dL    Creatinine 3.12 (H) 0.50 - 1.05 mg/dL    eGFR 16 (L) >60 mL/min/1.73m*2    Calcium 7.3 (L) 8.6 - 10.3 mg/dL    Phosphorus 2.2 (L) 2.5 - 4.9 mg/dL    Albumin 2.6 (L) 3.4 - 5.0 g/dL   Magnesium   Result Value Ref Range    Magnesium 1.77 1.60 - 2.40 mg/dL   POCT GLUCOSE   Result Value Ref Range    POCT Glucose 162 (H) 74 - 99 mg/dL   POCT GLUCOSE   Result Value Ref Range    POCT Glucose 135 (H) 74 - 99 mg/dL   POCT GLUCOSE   Result Value Ref Range    POCT Glucose 158 (H) 74 - 99 mg/dL    CBC and Auto Differential   Result Value Ref Range    WBC 9.7 4.4 - 11.3 x10*3/uL    nRBC 0.0 0.0 - 0.0 /100 WBCs    RBC 2.90 (L) 4.00 - 5.20 x10*6/uL    Hemoglobin 8.9 (L) 12.0 - 16.0 g/dL    Hematocrit 28.9 (L) 36.0 - 46.0 %     80 - 100 fL    MCH 30.7 26.0 - 34.0 pg    MCHC 30.8 (L) 32.0 - 36.0 g/dL    RDW 13.5 11.5 - 14.5 %    Platelets 238 150 - 450 x10*3/uL    Neutrophils % 73.1 40.0 - 80.0 %    Immature Granulocytes %, Automated 2.6 (H) 0.0 - 0.9 %    Lymphocytes % 14.5 13.0 - 44.0 %    Monocytes % 8.1 2.0 - 10.0 %    Eosinophils % 0.8 0.0 - 6.0 %    Basophils % 0.9 0.0 - 2.0 %    Neutrophils Absolute 7.05 1.20 - 7.70 x10*3/uL    Immature Granulocytes Absolute, Automated 0.25 0.00 - 0.70 x10*3/uL    Lymphocytes Absolute 1.40 1.20 - 4.80 x10*3/uL    Monocytes Absolute 0.78 0.10 - 1.00 x10*3/uL    Eosinophils Absolute 0.08 0.00 - 0.70 x10*3/uL    Basophils Absolute 0.09 0.00 - 0.10 x10*3/uL   Renal function panel   Result Value Ref Range    Glucose 143 (H) 74 - 99 mg/dL    Sodium 133 (L) 136 - 145 mmol/L    Potassium 4.4 3.5 - 5.3 mmol/L    Chloride 97 (L) 98 - 107 mmol/L    Bicarbonate 29 21 - 32 mmol/L    Anion Gap 11 10 - 20 mmol/L    Urea Nitrogen 40 (H) 6 - 23 mg/dL    Creatinine 3.54 (H) 0.50 - 1.05 mg/dL    eGFR 13 (L) >60 mL/min/1.73m*2    Calcium 7.7 (L) 8.6 - 10.3 mg/dL    Phosphorus 2.3 (L) 2.5 - 4.9 mg/dL    Albumin 2.7 (L) 3.4 - 5.0 g/dL   Magnesium   Result Value Ref Range    Magnesium 1.80 1.60 - 2.40 mg/dL     *Note: Due to a large number of results and/or encounters for the requested time period, some results have not been displayed. A complete set of results can be found in Results Review.         SIGNATURE: Joseph Mitchell MD PATIENT NAME: Daphne Morris   DATE: January 20, 2025 MRN: [id number]   TIME: 11:50 AM PAGER: [phone]

## 2025-01-20 NOTE — POST-PROCEDURE NOTE
Report to Receiving RN:    Report To: Marysol Mejia   Time Report Called: [id number]  Hand-Off Communication: Via epic chat   Complications During Treatment: No  Ultrafiltration Treatment: No  Medications Administered During Dialysis: No  Blood Products Administered During Dialysis: No  Labs Sent During Dialysis: No  Heparin Drip Rate Changes: No  Dialysis Catheter Dressing: See pre note  Last Dressing Change: See pre note    Electronic Signatures:  (Signed Yan Larson)     Last Updated: 2:26 PM by YAN LARSON

## 2025-01-20 NOTE — PRE-PROCEDURE NOTE
Report from Sending RN:    Report From: Amarilis PYLE  Recent Surgery of Procedure: No  Baseline Level of Consciousness (LOC): AOX2  Oxygen Use: Yes  Type: NC  Diabetic: Yes  Last BP Med Given Day of Dialysis: SEE MAR  Last Pain Med Given: SEE MAR  Lab Tests to be Obtained with Dialysis: No  Blood Transfusion to be Given During Dialysis: No  Available IV Access: Yes  Medications to be Administered During Dialysis: No  Continuous IV Infusion Running: No  Restraints on Currently or in the Last 24 Hours: No  Hand-Off Communication: Report taking from bedside rn. Pt is stable and alert  Dialysis Catheter Dressing: Clean and dry  Last Dressing Change: 1/15/25

## 2025-01-20 NOTE — PROGRESS NOTES
Occupational Therapy                 Therapy Communication Note    Patient Name: Daphne Morris  MRN: [id number]  Department: [facility] 3 E  Room: 20/20-A  Today's Date: 1/20/2025     Discipline: Occupational Therapy    OT Missed Visit: Yes     Missed Visit Reason: Missed Visit Reason: Other (Comment) (Pt currently off floor for HD, no OT tx completed this date.)    Missed Time: Attempt at 1147    Comment:

## 2025-01-20 NOTE — PROGRESS NOTES
"Daphne Morris is a 69 y.o. female on day 10 of admission presenting with Clotted renal dialysis AV graft (CMS-[facility]).    Subjective   Patient seen and examined.  Has a new tunneled dialysis catheter placed in the right chest.  Had prior graft thrombectomy by interventional radiology.  Apparently, attempts after thrombectomy to access of the graft have been unsuccessful.       Objective     Physical Exam    General: Awake, alert, follows commands.  Confused at times.  HEENT: Head is atraumatic, normocephalic.   Cardiac: Normal S1-S2.  Regular rate and rhythm.  No murmurs.  Respiratory: Lungs clear to auscultation.  No adventitious sounds.  Abdomen: Soft, nondistended, nontender.  Bowel sounds x4 quadrants.  Pulse exam: Palpable brachial and radial pulses bilaterall.   femoral, popliteal, pedal pulses are palpable bilaterally.  Extremities: Right brachial axillary graft with a audible bruit.  There is some ecchymosis near the antecubital fossa, it appears they may have infiltrated the graft while attempting dialysis  Neuro: Moves all extremities spontaneously.  No focal deficits.  Psych: Appropriate affect.  Answers questions appropriately        Last Recorded Vitals  Blood pressure 115/63, pulse 82, temperature 37 °C (98.6 °F), temperature source Temporal, resp. rate 21, height 1.676 m (5' 6\"), weight 63.5 kg (139 lb 15.9 oz), SpO2 100%.  Intake/Output last 3 Shifts:  I/O last 3 completed shifts:  In: 520 (8.2 mL/kg) [P.O.:520]  Out: 0 (0 mL/kg)   Weight: 63.5 kg     Relevant Results  Scheduled medications  amiodarone, 200 mg, oral, Daily with breakfast  [Held by provider] amLODIPine, 5 mg, oral, Daily  aspirin, 81 mg, oral, Daily  atorvastatin, 40 mg, oral, Nightly  [Held by provider] clopidogrel, 75 mg, oral, Daily  epoetin debra or biosimilar, 5,000 Units, subcutaneous, Every Mon/Wed/Fri  heparin (porcine), 5,000 Units, subcutaneous, q8h SUMIT  heparin, 2,000 Units, intra-catheter, After Dialysis  heparin, 2,000 " Units, intra-catheter, After Dialysis  heparin, 2,000 Units, intra-catheter, After Dialysis  insulin lispro, 0-5 Units, subcutaneous, TID AC  ipratropium-albuteroL, 3 mL, nebulization, TID  levothyroxine, 200 mcg, oral, Daily  levothyroxine, 50 mcg, oral, Daily  metoprolol tartrate, 25 mg, oral, BID  oxygen, , inhalation, Nightly  pantoprazole, 40 mg, intravenous, BID  sodium chloride, 3 mL, nebulization, TID  venlafaxine XR, 150 mg, oral, Daily      Continuous medications     PRN medications  PRN medications: acetaminophen **OR** acetaminophen **OR** acetaminophen, albuterol, benzocaine-menthol, bisacodyl, dextromethorphan-guaifenesin, dextrose, dextrose, glucagon, glucagon, guaiFENesin, melatonin, oxygen, polyethylene glycol, prochlorperazine **OR** prochlorperazine **OR** prochlorperazine                     Assessment/Plan   End-stage renal disease requiring hemodialysis  Right brachial axillary graft status post graft thrombectomy    On my physical exam, the patient has a patent right brachial axillary graft status post thrombectomy with axillary vein stenting.  She also appears to have some ecchymosis near the antecubital fossa and in the upper arm where they attempted access of the graft.  She appears to have an old infiltration that is healing nicely    I recommend resting the right arm for 2 weeks while continuing to use the tunneled dialysis catheter right chest.  Outpatient follow-up with vascular surgery in 1 week to assess the graft with the duplex.    Ultimately, she should be able to continue using the graft for dialysis         I spent 35 minutes in the professional and overall care of this patient.      Lewis Norwood, APRN-CNP

## 2025-01-20 NOTE — CARE PLAN
The patient's goals for the shift include      The clinical goals for the shift include safety/pain control    Over the shift, the patient did not make progress toward the following goals. Barriers to progression include weakness/confusion. Recommendations to address these barriers include administer medications/interventions as ordered.

## 2025-01-20 NOTE — PROGRESS NOTES
Daphne Morris is a 69 y.o. female on day 10 of admission presenting with Clotted renal dialysis AV graft (CMS-[facility]).      Subjective   Seen and examined. Patient just came from dialysis.  Denies any pain except on her left upper arm some discomfort patient has sacral wound.  Patient denies any chest pain or shortness of breath on 3 L nasal cannula which is her baseline.  Patient's pulse ox drops to 86% when she was in room air.       Objective     Last Recorded Vitals  /63 (BP Location: Left arm, Patient Position: Lying)   Pulse 89   Temp 36 °C (96.8 °F) (Temporal)   Resp 21   Wt 63.5 kg (139 lb 15.9 oz)   SpO2 100%   Intake/Output last 3 Shifts:    Intake/Output Summary (Last 24 hours) at 1/20/2025 1411  Last data filed at 1/20/2025 1330  Gross per 24 hour   Intake 740 ml   Output 0 ml   Net 740 ml       Admission Weight  Weight: 70 kg (154 lb 5.2 oz) (01/11/25 0537)    Daily Weight  01/20/25 : 63.5 kg (139 lb 15.9 oz)    Image Results  IR angiogram fistula graft declot  Narrative: Interpreted By:  George Crane,   STUDY:  IR ANGIOGRAM FISTULA GRAFT DECLOT; 1/13/2025 4:30 pm      INDICATION:  Signs/Symptoms:occluded fistula. Occluded right upper extremity  axillary artery-axillary vein loop graft      COMPARISON:  None      ACCESSION NUMBER(S):  [id number]      ORDERING CLINICIAN:  DUANE REID      TECHNIQUE:  Conscious sedation: 90 minutes.  Right upper extremity AV graft thrombectomy with angioplasty and  stenting of outflow axillary vein.      FINDINGS:  Informed consent obtained. Patient positioned supine and connected  physiologic monitoring. Conscious sedation provided Versed and  fentanyl. Right upper extremity prepped and draped. Patient received  with micro puncture sheath access in venous and arterial limb. These  were exchanged for 7 Telugu sheaths. Contrast exam of the AV graft  circuit demonstrates patent SVC, brachiocephalic vein, subclavian  vein and mid-central axillary vein. There  is a grade stenosis at the  outflow axillary vein slightly beyond the anastomosis. The graft is  occluded. There is a moderate juxta anastomotic stenosis at the  arterial limb. Mechanical thrombectomy was performed with penumbra.  The venous anastomotic stenosis was angioplastied with an 8 mm  balloon. There was persistent hemodynamically significant narrowing.  This was treated with the deployment of a 7 mm stent. Follow-up  venogram demonstrates full caliber vessel patency. A 4 Danish Pearl  balloon was used to dislodge the arterial anastomotic plug. Angiogram  demonstrated a moderate narrowing at the juxta arterial segment.  Angioplasty with 6 mm balloon yielded only minimal improvement.  Aspiration through either limb was brisk presumably reflecting flow  rates. The sheaths were removed and hemostasis established with  purse-string sutures.      Impression: Thrombosed right upper extremity axillary artery-axillary vein loop  graft. Mechanical thrombectomy angioplasty and stenting of severe  stenosis at the venous anastomosis. There is also a moderate stenosis  at the juxta arterial segment which could not be significantly  improved with 6 mm angioplasty. Flow rates seemed adequate.          Signed by: George Crane 1/17/2025 10:20 AM  Dictation workstation:   [id number]  XR chest 1 view  Narrative: Interpreted By:  Silver Vargas,   STUDY:  XR CHEST 1 VIEW; 1/17/2025 5:49 am      INDICATION:  CLINICAL INFORMATION: Signs/Symptoms:monitor right pleural effusion.      COMPARISON:  01/16/2025      ACCESSION NUMBER(S):  [id number]      ORDERING CLINICIAN:  ELIJAH MUSE      TECHNIQUE:  Portable chest one view.      FINDINGS:  The cardiac silhouette is quite prominent suggesting cardiomegaly.  There is a stable moderate-sized right pleural effusion. Diffuse hazy  bilateral infiltrates are present. Dual port central venous catheter  is present on the right with tip overlying the SVC above the right  atrium.  Endovascular stent is identified within the proximal right  upper extremity line with what appears to be a vascular graft.      Impression: Bilateral infiltrates with right effusion and postsurgical findings  as above. There is no interval change when compared to the previous  examination.      MACRO:  none      Signed by: Silver Vargas 1/17/2025 7:55 AM  Dictation workstation:   [id number]      Physical Exam  Vitals and nursing note reviewed.   Constitutional:       Appearance: Normal appearance.   HENT:      Head: Normocephalic and atraumatic.      Mouth/Throat:      Mouth: Mucous membranes are moist.   Cardiovascular:      Rate and Rhythm: Normal rate.   Pulmonary:      Effort: Pulmonary effort is normal.      Breath sounds: Normal breath sounds.   Abdominal:      General: Bowel sounds are normal.      Palpations: Abdomen is soft.   Musculoskeletal:         General: Normal range of motion.      Cervical back: Normal range of motion and neck supple.   Skin:     General: Skin is warm.   Neurological:      General: No focal deficit present.      Mental Status: She is alert and oriented to person, place, and time.   Psychiatric:         Mood and Affect: Mood normal.         Relevant Results  No current facility-administered medications on file prior to encounter.     Current Outpatient Medications on File Prior to Encounter   Medication Sig Dispense Refill    acetaminophen (Tylenol) 500 mg tablet Take 2 tablets (1,000 mg) by mouth 2 times a day.      albuterol 90 mcg/actuation inhaler INHALE 2 PUFFS BY MOUTH EVERY 4 HOURS AS NEEDED 8.5 g 0    amiodarone (Pacerone) 200 mg tablet TAKE 1 TABLET BY MOUTH ONCE DAILY WITH BREAKFAST. 90 tablet 0    amLODIPine (Norvasc) 5 mg tablet Take 1 tablet (5 mg) by mouth once daily.      aspirin 81 mg EC tablet Take 1 tablet (81 mg) by mouth once daily.      atorvastatin (Lipitor) 40 mg tablet TAKE ONE TABLET BY MOUTH EVERY DAY 90 tablet 3    BD Calista 2nd Gen Pen Needle 32 gauge x  "5/32\" needle USE SUBCUTANEOUSLY AS DIRECTED TWICE DAILY 200 each 2    clopidogrel (Plavix) 75 mg tablet Take 1 tablet (75 mg) by mouth once daily.      ferrous gluconate 324 (38 Fe) mg tablet Take 1 tablet (324 mg) by mouth once daily with breakfast. 30 tablet 3    FreeStyle Shakir 14 Day Sensor kit USE AS DIRECTED TO CHECK SUGARS 3 TO 4 TIMES DAILY 1 each 11    FreeStyle Shakir 2 [place] misc Use as instructed 1 each 0    FreeStyle Shakir 3 [place] misc Use as instructed 1 each 0    FreeStyle Shakir 3 Sensor device Change sensor Q 14 days 2 each 11    HumaLOG KwikPen Insulin 100 unit/mL injection up to 15 units Subcutaneous three times a day per sliding scaleup to 15 units Subcutaneous three times a day per sliding scale (Patient taking differently: up to 15 units Subcutaneous three times a day per sliding scale.      151-200 2 units  201-250 4 units  251-300 6 units  301-350 8 units  351-400 10 units) 5 each 3    ipratropium-albuteroL (Duo-Neb) 0.5-2.5 mg/3 mL nebulizer solution INHALE THE CONTENTS OF 1 VIAL VIA NEBULIZER EVERY 6 HOURS AS NEEDED. 360 mL 0    levothyroxine (Synthroid, Levoxyl) 200 mcg tablet Take 1 tablet by mouth once daily in the morning before a meal. 90 tablet 3    levothyroxine (Synthroid, Levoxyl) 50 mcg tablet Take 1 tablet (50 mcg) by mouth once daily in the morning. Take before meals. 90 tablet 3    metoprolol succinate XL (Toprol-XL) 50 mg 24 hr tablet TAKE ONE TABLET BY MOUTH TWO TIMES A  tablet 0    oxygen DME/Hospice (O2) therapy Inhale 3 L/min once daily at bedtime. Indications: decreased oxygen in the tissues or blood      pantoprazole (ProtoNix) 40 mg EC tablet TAKE ONE TABLET BY MOUTH TWO TIMES A DAY 60 tablet 0    venlafaxine XR (Effexor-XR) 75 mg 24 hr capsule Take 2 capsules (150 mg) by mouth once daily. 180 capsule 3     Results for orders placed or performed during the hospital encounter of 01/10/25 (from the past 24 hours)   POCT GLUCOSE   Result Value Ref Range    POCT " Glucose 158 (H) 74 - 99 mg/dL   CBC and Auto Differential   Result Value Ref Range    WBC 9.7 4.4 - 11.3 x10*3/uL    nRBC 0.0 0.0 - 0.0 /100 WBCs    RBC 2.90 (L) 4.00 - 5.20 x10*6/uL    Hemoglobin 8.9 (L) 12.0 - 16.0 g/dL    Hematocrit 28.9 (L) 36.0 - 46.0 %     80 - 100 fL    MCH 30.7 26.0 - 34.0 pg    MCHC 30.8 (L) 32.0 - 36.0 g/dL    RDW 13.5 11.5 - 14.5 %    Platelets 238 150 - 450 x10*3/uL    Neutrophils % 73.1 40.0 - 80.0 %    Immature Granulocytes %, Automated 2.6 (H) 0.0 - 0.9 %    Lymphocytes % 14.5 13.0 - 44.0 %    Monocytes % 8.1 2.0 - 10.0 %    Eosinophils % 0.8 0.0 - 6.0 %    Basophils % 0.9 0.0 - 2.0 %    Neutrophils Absolute 7.05 1.20 - 7.70 x10*3/uL    Immature Granulocytes Absolute, Automated 0.25 0.00 - 0.70 x10*3/uL    Lymphocytes Absolute 1.40 1.20 - 4.80 x10*3/uL    Monocytes Absolute 0.78 0.10 - 1.00 x10*3/uL    Eosinophils Absolute 0.08 0.00 - 0.70 x10*3/uL    Basophils Absolute 0.09 0.00 - 0.10 x10*3/uL   Renal function panel   Result Value Ref Range    Glucose 143 (H) 74 - 99 mg/dL    Sodium 133 (L) 136 - 145 mmol/L    Potassium 4.4 3.5 - 5.3 mmol/L    Chloride 97 (L) 98 - 107 mmol/L    Bicarbonate 29 21 - 32 mmol/L    Anion Gap 11 10 - 20 mmol/L    Urea Nitrogen 40 (H) 6 - 23 mg/dL    Creatinine 3.54 (H) 0.50 - 1.05 mg/dL    eGFR 13 (L) >60 mL/min/1.73m*2    Calcium 7.7 (L) 8.6 - 10.3 mg/dL    Phosphorus 2.3 (L) 2.5 - 4.9 mg/dL    Albumin 2.7 (L) 3.4 - 5.0 g/dL   Magnesium   Result Value Ref Range    Magnesium 1.80 1.60 - 2.40 mg/dL     *Note: Due to a large number of results and/or encounters for the requested time period, some results have not been displayed. A complete set of results can be found in Results Review.     IR angiogram fistula graft declot    Result Date: 1/17/2025  Interpreted By:  George Crane, STUDY: IR ANGIOGRAM FISTULA GRAFT DECLOT; 1/13/2025 4:30 pm   INDICATION: Signs/Symptoms:occluded fistula. Occluded right upper extremity axillary artery-axillary vein loop  graft   COMPARISON: None   ACCESSION NUMBER(S): [id number]   ORDERING CLINICIAN: DUANE REID   TECHNIQUE: Conscious sedation: 90 minutes. Right upper extremity AV graft thrombectomy with angioplasty and stenting of outflow axillary vein.   FINDINGS: Informed consent obtained. Patient positioned supine and connected physiologic monitoring. Conscious sedation provided Versed and fentanyl. Right upper extremity prepped and draped. Patient received with micro puncture sheath access in venous and arterial limb. These were exchanged for 7 Portuguese sheaths. Contrast exam of the AV graft circuit demonstrates patent SVC, brachiocephalic vein, subclavian vein and mid-central axillary vein. There is a grade stenosis at the outflow axillary vein slightly beyond the anastomosis. The graft is occluded. There is a moderate juxta anastomotic stenosis at the arterial limb. Mechanical thrombectomy was performed with penumbra. The venous anastomotic stenosis was angioplastied with an 8 mm balloon. There was persistent hemodynamically significant narrowing. This was treated with the deployment of a 7 mm stent. Follow-up venogram demonstrates full caliber vessel patency. A 4 Portuguese Pearl balloon was used to dislodge the arterial anastomotic plug. Angiogram demonstrated a moderate narrowing at the juxta arterial segment. Angioplasty with 6 mm balloon yielded only minimal improvement. Aspiration through either limb was brisk presumably reflecting flow rates. The sheaths were removed and hemostasis established with purse-string sutures.       Thrombosed right upper extremity axillary artery-axillary vein loop graft. Mechanical thrombectomy angioplasty and stenting of severe stenosis at the venous anastomosis. There is also a moderate stenosis at the juxta arterial segment which could not be significantly improved with 6 mm angioplasty. Flow rates seemed adequate.     Signed by: George Crane 1/17/2025 10:20 AM Dictation workstation:    [id number]    XR chest 1 view    Result Date: 1/17/2025  Interpreted By:  Silver Vargas, STUDY: XR CHEST 1 VIEW; 1/17/2025 5:49 am   INDICATION: CLINICAL INFORMATION: Signs/Symptoms:monitor right pleural effusion.   COMPARISON: 01/16/2025   ACCESSION NUMBER(S): [id number]   ORDERING CLINICIAN: ELIJAH MUSE   TECHNIQUE: Portable chest one view.   FINDINGS: The cardiac silhouette is quite prominent suggesting cardiomegaly. There is a stable moderate-sized right pleural effusion. Diffuse hazy bilateral infiltrates are present. Dual port central venous catheter is present on the right with tip overlying the SVC above the right atrium. Endovascular stent is identified within the proximal right upper extremity line with what appears to be a vascular graft.       Bilateral infiltrates with right effusion and postsurgical findings as above. There is no interval change when compared to the previous examination.   MACRO: none   Signed by: Silver Vargas 1/17/2025 7:55 AM Dictation workstation:   [id number]    IR CVC tunneled    Result Date: 1/16/2025  Interpreted By:  George Crane, STUDY: IR CVC TUNNELED; 1/15/2025 4:02 pm   INDICATION: Signs/Symptoms:Need tunneled dialysis catheter conversion and removal of acute catheter, graft is clotted again..   COMPARISON: None   ACCESSION NUMBER(S): [id number]   ORDERING CLINICIAN: AFSANEH MAI   TECHNIQUE: Conscious sedation: 30 minutes Ultrasound-guided vascular access Fluoroscopy guided insertion of tunneled central venous catheter without port Fluoroscopy time: 0.6 minutes Images: 3 Dose: 15.6 mGy Air Kerma   FINDINGS: Informed consent obtained. Patient positioned supine and connected to physiologic monitoring.  All elements of maximal sterile barrier were utilized including cap, mask, sterile gown, sterile gloves, large sterile drape, hand scrub, 2% chlorhexidine for skin cleaning and sterile ultrasound guidance. Ultrasound of right neck demonstrated partially  thrombosed internal jugular vein associated with non tunneled central venous catheter. Under ultrasound guidance, access into the vein was established with micro puncture and permanent image archived. Indwelling non tunneled central venous catheter was removed and hemostasis achieved by manual compression. Small dermatotomy made few cm below clavicle. A 14.5 Korean x 23 cm dual lumen cuffed hemodialysis catheter (Palindrome) was tunneled from dermatotomy to venotomy. Introducer sheath exchanged for a peel-away sheath. Distal end of the tunneled catheter advanced centrally through the peel-away sheath. Fluoroscopy confirmed tip of catheter near superior cavo-atrial junction. The catheter aspirated and flushed freely, locked with heparin, secured, and covered with dressing. Patient tolerated procedure well.       Ultrasound and fluoroscopy guided insertion of tunneled central venous catheter for hemodialysis via right internal jugular vein. Indwelling non tunneled central venous catheter was removed.     Signed by: Georeg Crane 1/16/2025 3:16 PM Dictation workstation:   [id number]    XR chest 1 view    Result Date: 1/16/2025  Interpreted By:  Ting Mcgovern, STUDY: XR CHEST 1 VIEW 1/16/2025 5:24 am   INDICATION: Signs/Symptoms:pleural effusion assessment   COMPARISON: 01/15/2025   ACCESSION NUMBER(S): [id number]   ORDERING CLINICIAN: DUANE REID   TECHNIQUE: AP erect view of the chest at bedside   FINDINGS: Diaphysis catheter is unchanged in placement terminating in the SVC. The heart is enlarged. There is pulmonary vascular congestion and pulmonary edema appearing little worse when compared with yesterday's study. A moderate-sized right pleural effusion is unchanged.       Slight worsening of the pulmonary edema with stable moderate-sized right pleural effusion.   Signed by: Ting Mcgovern 1/16/2025 6:39 AM Dictation workstation:   [id number]    XR chest 1 view    Result Date: 1/15/2025  Interpreted By:  Yun  Erlin, STUDY: XR CHEST 1 VIEW;  1/15/2025 6:36 pm   INDICATION: Signs/Symptoms:hypercapnic respiratory failure.     COMPARISON: 11/01/2025   ACCESSION NUMBER(S): [id number]   ORDERING CLINICIAN: ELIJAH MUSE   FINDINGS: Hemodialysis catheter terminates over the distal SVC, unchanged. Right upper extremity vascular stent again noted.   Heart is enlarged. The aorta is atherosclerotic. There has been improvement in pulmonary edema and right pleural effusion. There remains a moderate size right pleural effusion. There remains peribronchovascular station all edema but slight improvement in the alveolar edema.   There is no pneumothorax.       Compared to 01/11/2025, there is improvement in pulmonary edema and right pleural effusion but considerable residual edema remains and residual moderate right effusion noted.     MACRO: None.   Signed by: Erlin Malcolm 1/15/2025 6:56 PM Dictation workstation:   [id number]    US guided percutaneous placement    Result Date: 1/13/2025  Interpreted By:  George Crane, STUDY: US GUIDED PERCUTANEOUS PLACEMENT; 1/13/2025 12:25 pm   INDICATION: Thrombosed right upper extremity AV graft.   COMPARISON: None   ACCESSION NUMBER(S): [id number]   ORDERING CLINICIAN: TEJINDER OHARA   TECHNIQUE: Ultrasound-guided vascular access x2 Administration of tPA into AV graft   FINDINGS: Right upper extremity prepped and draped. Ultrasound evaluation of axillary artery-vein loop graft confirms thrombosed graft. Under ultrasound guidance, access toward inflow established within the arterial limb with 5 Thai micro puncture using Seldinger technique. Subsequently, under ultrasound guidance, access toward the outflow established within the venous limb with 5 Thai micro puncture using Seldinger technique. A mixture of 4 mg tPA and 3000 units of heparin was infused throughout the graft via either access. This was allowed to dwell in preparation for thrombectomy in the angio suite.        Ultrasound-guided vascular access into arterial and venous limb of right upper extremity AV graft. Administration of thrombolytic into the thrombosed graft.     Signed by: George Crane 1/13/2025 2:13 PM Dictation workstation:   [id number]    XR chest 1 view    Result Date: 1/11/2025  Interpreted By:  Silver Vargas, STUDY: XR CHEST 1 VIEW; 1/11/2025 8:59 am   INDICATION: CLINICAL INFORMATION: Signs/Symptoms:Pleural effusion assessment.   COMPARISON: 01/10/2025   ACCESSION NUMBER(S): [id number]   ORDERING CLINICIAN: DUANE REID   TECHNIQUE: Portable chest one view.   FINDINGS: The cardiac silhouette is quite prominent suggesting cardiomegaly. Right-sided central venous catheter tip overlies the SVC above the right atrium. Extensive bilateral alveolar infiltrate is present. Density is greatest within the right midlung and right lung base. Small effusions are suspected, possibly greater on the right.       Cardiomegaly with bilateral infiltrates and possible bilateral effusions. Accounting for differences in patient positioning the findings are thought to be similar compared to the study from 01/10/2025.   MACRO: none   Signed by: Silver Vargas 1/11/2025 10:50 AM Dictation workstation:   [id number]    XR chest 1 view    Result Date: 1/10/2025  Interpreted By:  Nuha Cheatham, STUDY: XR CHEST 1 VIEW;  1/10/2025 2:27 pm   INDICATION: Signs/Symptoms:line placement.   COMPARISON: 01/09/2025 CT chest dated 05/18/2024   ACCESSION NUMBER(S): [id number]   ORDERING CLINICIAN: BARBARA LYNCH   FINDINGS: The cardiac silhouette is enlarged. There are atherosclerotic changes of the aorta.   There is bilateral parenchymal infiltration. There is a right pleural effusion.   There is suggestion of a venous catheter on the right with the tip in the region of the superior vena cava. The bones are not well seen.   COMPARISON OF FINDING: The chest is similar.       Bilateral parenchymal infiltration. Probable right pleural  effusion.   MACRO: none   Signed by: Nuha Cheatham 1/10/2025 3:27 PM Dictation workstation:   [id number]    CT head wo IV contrast    Result Date: 1/10/2025  Interpreted By:  Silver Vargas, STUDY: CT HEAD WO IV CONTRAST; 1/10/2025 12:49 pm   INDICATION: Signs/Symptoms:altered mentation.   COMPARISON: 12/31/2024   ACCESSION NUMBER(S): [id number]   ORDERING CLINICIAN: GÉNESIS MARTEL   TECHNIQUE: A helical acquisition data was obtained.   One or more of the following dose reduction techniques were used: Automated exposure control Adjustment of the mA and/or kV according to patient size, and/or use of iterative reconstruction technique.   FINDINGS: COMMENTS: Several of the slices are obscured by motion artifact.   Intracranial findings: There is no evidence for intracranial hemorrhage or mass effect. Age-related atrophy is present. Periventricular white matter hypodensity is present, most consistent with age-related change and/or white matter ischemic disease.   Paranasal sinuses and temporal bone findings:  The visualized portions of the paranasal sinuses, mastoid air cells, and middle ear cavities are clear.   Orbital findings: There is hyperdense material within the globe of the right eye occupying a majority of the globe with this partially obscured by motion artifact. This was present on 12/31/2024 but not identified on 05/14/2024 or 04/06/2021. Please correlate with physical exam. I am not certain whether this represents hemorrhage, mass, or some type of treatment/prosthetic.       No acute intracranial pathologic findings are identified. Age-related intracranial findings are present, stable. Abnormal appearance of the right eye for which clinical correlation is recommended as above   MACRO: none   Signed by: Silver Vargas 1/10/2025 2:07 PM Dictation workstation:   [id number]    XR chest 1 view    Result Date: 1/9/2025  Interpreted By:  Sujit Rebolledo, STUDY: XR CHEST 1 VIEW; ;  1/9/2025 9:33 am    INDICATION: Signs/Symptoms:dyspnea, on HD.   COMPARISON: 01/01/2025   ACCESSION NUMBER(S): [id number]   ORDERING CLINICIAN: CHANDRIKA CAMARILLO   TECHNIQUE: A single AP radiograph of the chest is performed.   FINDINGS: The heart is enlarged. There is mild vascular and interstitial congestion. There is a moderate size right pleural effusion with underlying atelectasis or consolidation. There is increasing consolidation and/or atelectasis in the left lung base with small left pleural effusion. There is no pneumothorax.       Persistent cardiomegaly with vascular and interstitial congestion.   Bilateral pleural effusions and parenchymal opacity, greater on the right. These findings have increased in the interval. Continued clinical and radiographic follow-up is recommended.     MACRO: None   Signed by: Sujit Rebolledo 1/9/2025 9:46 AM Dictation workstation:   [id number]    ECG 12 lead    Result Date: 1/8/2025  Normal sinus rhythm Left axis deviation Left bundle branch block Abnormal ECG When compared with ECG of 26-DEC-2024 08:17, Left bundle branch block is now Present Criteria for Anterior infarct are no longer Present Criteria for Anterolateral infarct are no longer Present Criteria for Inferior infarct are no longer Present See ED provider note for full interpretation and clinical correlation Confirmed by Wanda Banegas ([id number]) on 1/8/2025 7:27:31 PM    ECG 12 lead    Result Date: 1/8/2025  Sinus rhythm with 1st degree AV block Left axis deviation Left bundle branch block Abnormal ECG When compared with ECG of 29-DEC-2024 17:49, (unconfirmed) Sinus rhythm has replaced Wide QRS rhythm See ED provider note for full interpretation and clinical correlation Confirmed by Wanda Banegas ([id number]) on 1/8/2025 7:24:03 PM    ECG 12 lead    Result Date: 1/7/2025  Wide QRS rhythm Left axis deviation Left bundle branch block Abnormal ECG When compared with ECG of 26-DEC-2024 08:17, Wide QRS rhythm has replaced Sinus  rhythm See ED provider note for full interpretation and clinical correlation Confirmed by Rose Viera ([id number]) on 1/7/2025 3:02:24 PM    Electrocardiogram, 12-lead PRN ACS symptoms    Result Date: 1/6/2025  Sinus rhythm Left axis deviation Left bundle branch block Abnormal ECG Confirmed by Sujit Hernandez ([id number]) on 1/6/2025 9:40:53 AM    US thoracentesis    Result Date: 1/3/2025  Interpreted By:  Danielle Hutson, STUDY: US THORACENTESIS; 1/3/940573:51 pm   INDICATION: Signs/Symptoms:Right pleural effusion.   COMPARISON: None.   ACCESSION NUMBER(S): [id number]   ORDERING CLINICIAN: ANGEL RAMOS   TECHNIQUE: INTERVENTIONALIST: Danielle Hutson   CONSENT: The patient/patient's POA/next of kin was informed of the nature of the proposed procedure. Risks were discussed including but not limited to bleeding, infection, pain at insertion site, or pneumo/hemothorax requiring chest tube placement. Purpose of treatment and alternative options were also reviewed. All questions were answered and patient agreed to proceed.   SEDATION: None   MEDICATION/CONTRAST: Lidocaine 1%.   TIME OUT: A time out was performed immediately prior to procedure start with the interventional team, correctly identifying the patient name, date of birth, MRN, procedure, anatomy (including marking of site and side), patient position, procedure consent form, relevant laboratory and imaging test results, antibiotic administration, safety precautions, and procedure-specific equipment needs.   FINDINGS: The patient was placed in the left lateral decubitus position.   The patient's right pleural space was scanned using the ultrasound probe. The area of fluid most amenable to drainage was marked. Color doppler was used to assess for vasculature in the intended field.   The patient's skin was sterilized using chlorhexidine and draped in sterile manner. The skin was anesthestized with 1% lidocaine.  Under real time ultrasound guidance, a 5F valved  centesis catheter was inserted into the right pleural space where previously marked. A total of 1,000 mL of clear yellow pleural fluid was removed. The catheter was then removed and a sterile op site was placed over the insertion site. Sterile technique used throughout entirety of procedure.   Specimens were obtained, labeled and sent to lab with requisitions ordered by primary team.   The patient tolerated the procedure well and there were no immediate complications.       Uneventful thoracentesis, as detailed above: Right Pleural space, 1,000 mL   Performed and dictated at [facility].   Signed by: Danielle Hutson 1/3/2025 12:51 PM Dictation workstation:   [id number]    XR chest 1 view    Result Date: 1/1/2025  Interpreted By:  Beulah Chacon, STUDY: XR CHEST 1 VIEW;  1/1/2025 3:36 am   INDICATION: Signs/Symptoms:respiratory failure, Influenza     COMPARISON: Chest x-ray 12/29/2024   ACCESSION NUMBER(S): [id number]   ORDERING CLINICIAN: ANTHONY CASH   TECHNIQUE: Portable semi-upright frontal view of the chest was obtained .   FINDINGS: Monitoring wires are overlying the patient.   The heart is enlarged. There is a left atrial appendage closure device. There is mild vascular congestion. Atherosclerotic calcifications are present at the aortic arch.   There is a moderate size right pleural effusion with atelectasis/consolidation at the right lung base. No pneumothorax.       1. Cardiomegaly. Mild vascular congestion. Moderate right pleural effusion with atelectasis/consolidation at the right lung base.       MACRO: None.   Signed by: Beulah Chacon 1/1/2025 3:48 AM Dictation workstation:   [id number]    XR forearm right 2 views    Result Date: 12/31/2024  STUDY: Forearm Radiographs; 12/31/2024 11:11AM INDICATION: Fall, skin tear to the right arm. COMPARISON: 4/29/2024 XR Forearm right. ACCESSION NUMBER(S): [id number] ORDERING CLINICIAN: MEAGAN ELY TECHNIQUE:  Two  view(s) of the right forearm. FINDINGS:  There is no displaced fracture.  The alignment is anatomic.  Soft tissue swelling about the mid forearm noted..    No acute osseous findings. Signed by Ashish Otero II, MD    CT cervical spine wo IV contrast    Result Date: 12/31/2024  Interpreted By:  Vinh Bautista, STUDY: CT CERVICAL SPINE WO IV CONTRAST; 12/31/2024 11:05 am   INDICATION: Signs/Symptoms:fall.   COMPARISON: CT dated 05/14/2024   ACCESSION NUMBER(S): [id number]   ORDERING CLINICIAN: MEAGAN ELY   TECHNIQUE: Contiguous axial CT images were obtained at  2 mm slice thickness through the cervical spine without contrast administration. The images were then reconstructed in the coronal and sagittal planes.   FINDINGS: There is no CT evidence of acute fracture identified. No prevertebral soft tissue swelling is identified.   ALIGNMENT: The vertebral bodies are well aligned without evidence of subluxation.   VERTEBRAE/DISC SPACES: Moderate disc space narrowing and marginal osteophyte formation is seen at the C6-7 level. Moderate to severe facet degenerative changes are seen throughout the cervical spine.   ADDITIONAL FINDINGS: Evaluation of the visualized soft tissues of the neck is limited by the lack of intravenous contrast.  Within this limitation, no gross mass or lymphadenopathy is identified.       1.  No CT evidence of acute fracture. 2.  Degenerative changes throughout the cervical spine, as above.   Signed by: Vinh Bautista 12/31/2024 11:47 AM Dictation workstation:   [id number]    CT head wo IV contrast    Result Date: 12/31/2024  Interpreted By:  Vinh Bautista, STUDY: CT HEAD WO IV CONTRAST; 12/31/2024 11:05 am   INDICATION: Signs/Symptoms:fall.   COMPARISON: CT head dated 05/14/2024   ACCESSION NUMBER(S): [id number]   ORDERING CLINICIAN: MEAGAN ELY   TECHNIQUE: Contiguous axial CT images were obtained through the head at 5 mm slice thickness without contrast administration.   FINDINGS:  INTRACRANIAL: The ventricles, sulci and basal cisterns are within normal limits for size and configuration. The grey-white differentiation is intact. There is no mass effect or midline shift. There is no extraaxial fluid collection. There is no intracranial hemorrhage.  The calvarium is unremarkable.   EXTRACRANIAL: Visualized paranasal sinuses and mastoids are clear.       No evidence of acute cortical infarct or intracranial hemorrhage.   MACRO: None     Signed by: Vinh Bautista 12/31/2024 11:41 AM Dictation workstation:   [id number]    XR chest 1 view    Result Date: 12/29/2024  Interpreted By:  Diomedes Peng, STUDY: XR CHEST 1 VIEW;  12/29/2024 6:38 pm   INDICATION: Signs/Symptoms:increased sob.   COMPARISON: 12/26/2024   ACCESSION NUMBER(S): [id number]   ORDERING CLINICIAN: AFSANEH ADKINS   FINDINGS: There is stable cardiomegaly. There is pulmonary vascular congestive change. There is right pleural effusion and right-sided atelectasis airspace disease. No pneumothorax.       Cardiomegaly with pulmonary vascular congestive change, right pleural effusion and right side atelectasis or airspace disease.   MACRO: None   Signed by: Diomedes Peng 12/29/2024 6:49 PM Dictation workstation:   [id number]    Vascular US Carotid Artery Duplex Bilateral    Result Date: 12/29/2024          [place] [id number]  Tel [phone] and Fax [phone]  Vascular Lab Report [facility] US CAROTID ARTERY DUPLEX BILATERAL  Patient Name:      MOE MAYA      Reading Physician:  [id number] Robert Henderson MD, RPVI Study Date:        12/27/2024           Ordering Physician: [id number] TONIA MONTANA MRN/PID:           [id number]             Technologist:       Porsche Marin                                                             T Accession#:        [id number]          Technologist 2: Date of Birth/Age: 1955 / 69 years Encounter#:         [id number] Gender:            F Admission Status:  Inpatient            Location Performed: [facility]  Diagnosis/ICD: Syncope and collapse-R55 CPT Codes:     20203 Cerebrovascular Carotid Duplex scan complete  CONCLUSIONS: Right Carotid: Findings are consistent with less than 50% stenosis of the right proximal internal carotid artery. Right external carotid artery appears patent with no evidence of stenosis. The right vertebral artery is patent with antegrade flow. No evidence of hemodynamically significant stenosis in the right subclavian artery. Dampened waveform noted in the right subclavian artery may be suggestive of a more proximal stenosis. Left Carotid: Findings are consistent with less than 50% stenosis of the left proximal internal carotid artery. Laminar flow seen by color Doppler. Left external carotid artery appears patent with no evidence of stenosis. The left vertebral artery is patent with antegrade flow. There is a >50% stenosis noted in the left subclavian artery.  Additional Findings: Technically difficult study due to patient respirations.  Imaging & Doppler Findings: Right Plaque Morph: The proximal right internal carotid artery demonstrates heterogenous and calcified plaque. The proximal right external carotid artery demonstrates heterogenous and calcified plaque. The mid right common carotid artery demonstrates heterogenous and calcified plaque. The distal right common carotid artery demonstrates heterogenous and calcified plaque. Left Plaque Morph: The proximal left internal carotid artery demonstrates heterogenous and calcified plaque. The proximal left external carotid artery demonstrates heterogenous and calcified plaque. The mid left common carotid artery demonstrates heterogenous and calcified plaque. The distal left common carotid artery demonstrates heterogenous and calcified plaque.   Right                         Left   PSV      EDV                PSV      EDV 71 cm/s            CCA P    75 cm/s 83 cm/s            CCA D    78 cm/s 123 cm/s 24 cm/s   ICA P    117 cm/s 25 cm/s 145 cm/s           ICA M    109 cm/s 108 cm/s           ICA D    89 cm/s 110 cm/s            ECA     131 cm/s 66 cm/s          Vertebral  50 cm/s 191 cm/s         Subclavian 270 cm/s               Right Left ICA/CCA Ratio  1.5  1.5   [id number] Robert Henderson MD, RPVI Electronically signed by [id number] Robert Henderson MD, RPVI on 12/29/2024 at 3:11:38 PM  ** Final **     ECG 12 lead    Result Date: 12/28/2024  Normal sinus rhythm Left axis deviation Minimal voltage criteria for LVH, may be normal variant ( Yung product ) Inferior infarct , age undetermined Anterolateral infarct (cited on or before 26-JUN-2024) Abnormal ECG When compared with ECG of 20-DEC-2024 10:53, No significant change was found See ED provider note for full interpretation and clinical correlation Confirmed by Rose Viera ([id number]) on 12/28/2024 9:59:04 AM    Transthoracic Echo Limited    Result Date: 12/27/2024   [facility], [address] [place] [address]               Tel [phone] and Fax [phone] TRANSTHORACIC ECHOCARDIOGRAM REPORT  Patient Name:       MOE OTERO ZULMA     Reading Physician:    [id number] Nicholas Antonio MD Study Date:         12/27/2024          Ordering Provider:    [id number] DOMONIQUE PURCELL MRN/PID:            [id number]            Fellow: Accession#:         [id number]        Nurse: Date of Birth/Age:  1955 / 69      Sonographer:          Nina mclain RDCS Gender assigned at  F                   Additional Staff: Birth: Height:             167.64 cm           Admit Date:           12/26/2024 Weight:             71.21 kg            Admission  Status:     Inpatient -                                                               Routine BSA / BMI:          1.80 m2 / 25.34     Encounter#:           [id number]                     kg/m2 Blood Pressure:     158/90 mmHg         Department Location:  [facility]                                                               Invasive Study Type:    TRANSTHORACIC ECHO (TTE) LIMITED Diagnosis/ICD: Syncope and collapse-R55; Other pericardial effusion                (noninflammatory)-I31.39 Indication:    Syncope, Pericardial effusion CPT Code:      Echo Limited-36372; Doppler Limited-81630; Color Doppler-32314 Patient History: Smoker:            Current. Diabetes:          Yes Pertinent History: CHF, HTN, PVD, Hyperlipidemia, A-Fib, CVA, CAD and COPD. Study Detail: The following Echo studies were performed: 2D, Doppler and color               flow.  PHYSICIAN INTERPRETATION: Left Ventricle: The left ventricular systolic function is normal, with a visually estimated ejection fraction of 55-60%. There is severely increased concentric left ventricular hypertrophy. There are no regional left ventricular wall motion abnormalities. The left ventricular cavity size is normal. There is severely increased septal and severely increased posterior left ventricular wall thickness. Spectral Doppler shows a Grade II (pseudonormal pattern) of left ventricular diastolic filling with an elevated left atrial pressure. Left Atrium: The left atrium is moderately dilated. Right Ventricle: The right ventricle is normal in size. There is normal right ventricular global systolic function. Right Atrium: The right atrium is mildly dilated. Aortic Valve: The aortic valve is trileaflet. There is no evidence of aortic valve regurgitation. Mitral Valve: The mitral valve is normal in structure. There is mild mitral valve regurgitation. Tricuspid Valve: The tricuspid valve is structurally normal. No evidence of tricuspid regurgitation. Pulmonic  Valve: The pulmonic valve is structurally normal. There is physiologic pulmonic valve regurgitation. Pericardium: Moderate pericardial effusion. The effusion is circumferential. There is no evidence of cardiac tamponade. Aorta: The aortic root is normal. Pulmonary Veins: The pulmonary veins appear normal and return normally to the left atrium. Systemic Veins: The inferior vena cava appears normal in size, with IVC inspiratory collapse greater than 50%.  CONCLUSIONS:  1. The left ventricular systolic function is normal, with a visually estimated ejection fraction of 55-60%.  2. Spectral Doppler shows a Grade II (pseudonormal pattern) of left ventricular diastolic filling with an elevated left atrial pressure.  3. There is severely increased septal and severely increased posterior left ventricular wall thickness.  4. There is severely increased concentric left ventricular hypertrophy.  5. There is normal right ventricular global systolic function.  6. The left atrium is moderately dilated.  7. Moderate pericardial effusion.  8. There is no evidence of cardiac tamponade. QUANTITATIVE DATA SUMMARY:  2D MEASUREMENTS:          Normal Ranges: IVSd:            1.81 cm  (0.6-1.1cm) LVPWd:           1.72 cm  (0.6-1.1cm) LVIDd:           3.39 cm  (3.9-5.9cm) LVIDs:           2.54 cm LV Mass Index:   135 g/m2 LVEDV Index:     32 ml/m2 LV % FS          25.0 %  LV SYSTOLIC FUNCTION BY 2D PLANIMETRY (MOD):                      Normal Ranges: EF-A4C View:    55 % (>=55%) EF-A2C View:    55 % EF-Biplane:     56 % EF-Visual:      58 % LV EF Reported: 58 %  LV DIASTOLIC FUNCTION:          Normal Ranges: MV Peak E:             1.02 m/s (0.7-1.2 m/s) MV Peak A:             0.40 m/s (0.42-0.7 m/s) E/A Ratio:             2.53     (1.0-2.2)  MITRAL VALVE:          Normal Ranges: MV DT:        178 msec (150-240msec)  TRICUSPID VALVE/RVSP:          Normal Ranges: Peak TR Velocity:     2.22 m/s RV Syst Pressure:     23 mmHg  (< 30mmHg)  [id number]  Nicholas Antonio MD Electronically signed on 12/27/2024 at 4:49:23 PM  ** Final **     ECG 12 lead    Result Date: 12/26/2024  Normal sinus rhythm Left axis deviation Anterolateral infarct (cited on or before 26-JUN-2024) Abnormal ECG When compared with ECG of 26-JUN-2024 07:13, No significant change was found See ED provider note for full interpretation and clinical correlation Confirmed by Rose Viera ([id number]) on 12/26/2024 10:31:18 AM    XR chest 1 view    Result Date: 12/26/2024  STUDY: Chest Radiograph;  12/26/2024 8:52 AM INDICATION: Shortness of breath.  Syncope. COMPARISON: Chest, single portable view obtained on 05/19/2024 at 09:50 hours.  XR Chest 04/27/2024. ACCESSION NUMBER(S): [id number] ORDERING CLINICIAN: BRENDON WEN TECHNIQUE:  Frontal chest was obtained at 08:51 hours. FINDINGS: CARDIOMEDIASTINAL SILHOUETTE: Heart is enlarged with increased pulmonary vascularity.  LUNGS: Right-sided pleural effusion slightly increased in size compared to 5/14/2024 with associated right basilar atelectasis and/or consolidation.  ABDOMEN: No remarkable upper abdominal findings.  BONES: No acute osseous changes.    Right-sided pleural effusion slightly increased in size compared to 5/14/2024 with associated right basilar atelectasis and/or consolidation. Signed by Zane Culver MD         Assessment/Plan        This is a 69-year-old female on dialysis who was admitted with clotted renal dialysis Arteriovenous graft and metabolic encephalopathy.   Evaluated by nephrology Dr. Mitchell, tunneled line was placed.  Patient has to follow-up with vascular surgeon for dialysis access.       Assessment & Plan  Clotted renal dialysis AV graft (CMS-HCC)  Plavix on hold  Vascular surgery/nephrology are following  Tunneled catheter placed 1/15    ESRD (end stage renal disease) (Multi)  Followed by nephrology (Dr. Mitchell)  patient was able to get dialysis on Tuesday but not on Thursday . Dialysis days are Tuesday,  Thursday, and Saturday    Respiratory acidosis with acute on chronic hypoxic respiratory failure  Much improved has been transferred out of ICU  Patient at baseline on 3 L of nasal cannula with Bipap at night    Metabolic encephalopathy  resolved  Monitor closely    Hypertension  Continue home amlodipine    Atrial fibrillation  Not on anticoagulation  S/p Watchman 2023  Continue with BB and amiodarone     Hyperlipidemia  Continue home atorvastatin    Hypothyroid  Continue home levothyroxine    Chronic diastolic heart failure  Bradycardia  resolved    Type 2 diabetes mellitus  Hypoglycemia protocol  Accu-Cheks before meals and at bedtime  Insulin sliding scale  Hemoglobin A1c 8.1 on January 1, 2025    Disposition    Anticipated discharge to SNF.       Discussed with the vascular surgeon patient has to follow-up with him as outpatient for dialysis access    I spent 35 minutes in the professional and overall care of this patient           CARLENE Nelson-CNP

## 2025-01-21 ENCOUNTER — DOCUMENTATION (OUTPATIENT)
Dept: RESEARCH | Age: 70
End: 2025-01-21
Payer: MEDICARE

## 2025-01-21 VITALS
DIASTOLIC BLOOD PRESSURE: 60 MMHG | TEMPERATURE: 98.2 F | BODY MASS INDEX: 21.44 KG/M2 | WEIGHT: 133.38 LBS | RESPIRATION RATE: 16 BRPM | HEIGHT: 66 IN | SYSTOLIC BLOOD PRESSURE: 115 MMHG | HEART RATE: 82 BPM | OXYGEN SATURATION: 97 %

## 2025-01-21 PROBLEM — T82.868A: Status: RESOLVED | Noted: 2025-01-09 | Resolved: 2025-01-21

## 2025-01-21 LAB — GLUCOSE BLD MANUAL STRIP-MCNC: 191 MG/DL (ref 74–99)

## 2025-01-21 PROCEDURE — 97535 SELF CARE MNGMENT TRAINING: CPT | Mod: GO,CO

## 2025-01-21 PROCEDURE — 2500000001 HC RX 250 WO HCPCS SELF ADMINISTERED DRUGS (ALT 637 FOR MEDICARE OP)

## 2025-01-21 PROCEDURE — 2500000002 HC RX 250 W HCPCS SELF ADMINISTERED DRUGS (ALT 637 FOR MEDICARE OP, ALT 636 FOR OP/ED): Performed by: INTERNAL MEDICINE

## 2025-01-21 PROCEDURE — 2500000004 HC RX 250 GENERAL PHARMACY W/ HCPCS (ALT 636 FOR OP/ED)

## 2025-01-21 PROCEDURE — 97530 THERAPEUTIC ACTIVITIES: CPT | Mod: GO,CO

## 2025-01-21 PROCEDURE — 82947 ASSAY GLUCOSE BLOOD QUANT: CPT

## 2025-01-21 PROCEDURE — 99239 HOSP IP/OBS DSCHRG MGMT >30: CPT | Performed by: NURSE PRACTITIONER

## 2025-01-21 PROCEDURE — 2500000005 HC RX 250 GENERAL PHARMACY W/O HCPCS: Performed by: INTERNAL MEDICINE

## 2025-01-21 PROCEDURE — 94668 MNPJ CHEST WALL SBSQ: CPT

## 2025-01-21 PROCEDURE — 2500000002 HC RX 250 W HCPCS SELF ADMINISTERED DRUGS (ALT 637 FOR MEDICARE OP, ALT 636 FOR OP/ED)

## 2025-01-21 PROCEDURE — 94640 AIRWAY INHALATION TREATMENT: CPT

## 2025-01-21 RX ADMIN — INSULIN LISPRO 1 UNITS: 100 INJECTION, SOLUTION INTRAVENOUS; SUBCUTANEOUS at 10:29

## 2025-01-21 RX ADMIN — METOPROLOL TARTRATE 25 MG: 25 TABLET, FILM COATED ORAL at 09:29

## 2025-01-21 RX ADMIN — AMIODARONE HYDROCHLORIDE 200 MG: 200 TABLET ORAL at 09:29

## 2025-01-21 RX ADMIN — Medication 3 ML: at 07:49

## 2025-01-21 RX ADMIN — LEVOTHYROXINE SODIUM 200 MCG: 0.1 TABLET ORAL at 05:39

## 2025-01-21 RX ADMIN — ASPIRIN 81 MG: 81 TABLET, COATED ORAL at 09:29

## 2025-01-21 RX ADMIN — IPRATROPIUM BROMIDE AND ALBUTEROL SULFATE 3 ML: 2.5; .5 SOLUTION RESPIRATORY (INHALATION) at 07:49

## 2025-01-21 RX ADMIN — VENLAFAXINE HYDROCHLORIDE 150 MG: 150 CAPSULE, EXTENDED RELEASE ORAL at 09:29

## 2025-01-21 RX ADMIN — LEVOTHYROXINE SODIUM 50 MCG: 0.05 TABLET ORAL at 05:40

## 2025-01-21 RX ADMIN — PANTOPRAZOLE SODIUM 40 MG: 40 INJECTION, POWDER, FOR SOLUTION INTRAVENOUS at 09:29

## 2025-01-21 RX ADMIN — IPRATROPIUM BROMIDE AND ALBUTEROL SULFATE 3 ML: 2.5; .5 SOLUTION RESPIRATORY (INHALATION) at 11:06

## 2025-01-21 RX ADMIN — HEPARIN SODIUM 5000 UNITS: 5000 INJECTION, SOLUTION INTRAVENOUS; SUBCUTANEOUS at 05:39

## 2025-01-21 RX ADMIN — Medication 3 ML: at 11:09

## 2025-01-21 ASSESSMENT — COGNITIVE AND FUNCTIONAL STATUS - GENERAL
TOILETING: A LOT
PERSONAL GROOMING: A LITTLE
DRESSING REGULAR LOWER BODY CLOTHING: A LITTLE
DAILY ACTIVITIY SCORE: 15
MOVING FROM LYING ON BACK TO SITTING ON SIDE OF FLAT BED WITH BEDRAILS: A LITTLE
TOILETING: A LITTLE
WALKING IN HOSPITAL ROOM: A LOT
DAILY ACTIVITIY SCORE: 19
EATING MEALS: A LITTLE
TURNING FROM BACK TO SIDE WHILE IN FLAT BAD: A LITTLE
HELP NEEDED FOR BATHING: A LOT
STANDING UP FROM CHAIR USING ARMS: A LITTLE
CLIMB 3 TO 5 STEPS WITH RAILING: A LOT
DRESSING REGULAR LOWER BODY CLOTHING: A LOT
MOBILITY SCORE: 16
DRESSING REGULAR UPPER BODY CLOTHING: A LITTLE
MOVING TO AND FROM BED TO CHAIR: A LITTLE
HELP NEEDED FOR BATHING: A LITTLE
DRESSING REGULAR UPPER BODY CLOTHING: A LITTLE
PERSONAL GROOMING: A LITTLE

## 2025-01-21 ASSESSMENT — PAIN SCALES - GENERAL
PAINLEVEL_OUTOF10: 0 - NO PAIN

## 2025-01-21 ASSESSMENT — PAIN - FUNCTIONAL ASSESSMENT
PAIN_FUNCTIONAL_ASSESSMENT: 0-10
PAIN_FUNCTIONAL_ASSESSMENT: 0-10

## 2025-01-21 ASSESSMENT — ACTIVITIES OF DAILY LIVING (ADL): HOME_MANAGEMENT_TIME_ENTRY: 11

## 2025-01-21 NOTE — NURSING NOTE
Assumed care of patient. Patient is resting in bed, She has no current needs. Call light is in place will continue to monitor.

## 2025-01-21 NOTE — PROGRESS NOTES
CONSULT PROGRESS NOTES    SERVICE DATE: 1/21/2025   SERVICE TIME: 11:57 AM    CONSULTING SERVICE: Nephrology    ASSESSMENT AND PLAN   69-year-old woman in on dialysis admitted with clotted arteriovenous graft, now alteration in mental status.  1.  End-stage renal disease  2.  Malfunctioning AVG  3.  Edema  4.  Fluid overload  5.  Essential hypertension  6.  Anemia of chronic kidney disease     Status post stent placement with significant bruising around her fistula.  We are giving it a rest and using a tunneled catheter.  Sounds like she will have outpatient vascular follow-up within 1 to 2 weeks of discharge.    Erythropoietin stimulating given with dialysis.  There is no need for daily routine chemistry in this dialysis patient.  I will follow her.  No objection to discharge when arrangements are made.  Anticipate next hemodialysis will be on Thursday in accordance with the regular schedule.    SUBJECTIVE  INTERVAL HPI: She generally feels okay.  There is no dyspnea.  She tolerated 2.5 L fluid removal on dialysis yesterday.  The blood pressure has been stable.  She wants to go home and she does not want to come back to the hospital.  She is going to a nursing facility.    MEDICATIONS:  amiodarone, 200 mg, oral, Daily with breakfast  [Held by provider] amLODIPine, 5 mg, oral, Daily  aspirin, 81 mg, oral, Daily  atorvastatin, 40 mg, oral, Nightly  [Held by provider] clopidogrel, 75 mg, oral, Daily  epoetin debra or biosimilar, 5,000 Units, subcutaneous, Every Mon/Wed/Fri  heparin (porcine), 5,000 Units, subcutaneous, q8h SUMIT  heparin, 2,000 Units, intra-catheter, After Dialysis  heparin, 2,000 Units, intra-catheter, After Dialysis  heparin, 2,000 Units, intra-catheter, After Dialysis  insulin lispro, 0-5 Units, subcutaneous, TID AC  ipratropium-albuteroL, 3 mL, nebulization, TID  levothyroxine, 200 mcg, oral, Daily  levothyroxine, 50 mcg, oral, Daily  metoprolol tartrate, 25 mg, oral, BID  oxygen, , inhalation,  Nightly  pantoprazole, 40 mg, intravenous, BID  sodium chloride, 3 mL, nebulization, TID  venlafaxine XR, 150 mg, oral, Daily             PRN medications: acetaminophen **OR** acetaminophen **OR** acetaminophen, albuterol, benzocaine-menthol, bisacodyl, dextromethorphan-guaifenesin, dextrose, dextrose, glucagon, glucagon, guaiFENesin, melatonin, oxygen, polyethylene glycol, prochlorperazine **OR** prochlorperazine **OR** prochlorperazine     OBJECTIVE  PHYSICAL EXAM:   Heart Rate:  []   Temp:  [36.7 °C (98.1 °F)-37 °C (98.6 °F)]   Resp:  [16-26]   BP: (112-140)/(56-83)   Weight:  [60.5 kg (133 lb 6.1 oz)]   SpO2:  [97 %-100 %]   Body mass index is 21.53 kg/m².  This is a chronically ill-appearing woman, seems much older than her known age  Very pale skin  Hearing intact  Phonation intact  Moist mucosa  Normal S1/normal S2  Lungs are clear anteriorly, increased work of breathing  Abdomen is soft, nondistended, nontender, positive bowel sounds  No Fay catheter in place, no suprapubic tenderness to palpation  Trace bilateral lower extremity edema  She has a right upper extremity graft and has an extensive bandage over it with significant blood on the bandage.  There is a bruit, but there is no thrill  Moves 4 limbs spontaneously  No obvious joint deformities  No lymphadenopathy  Right internal jugular tunneled hemodialysis catheter in place    DATA:   Labs:  Results for orders placed or performed during the hospital encounter of 01/10/25 (from the past 96 hours)   POCT GLUCOSE   Result Value Ref Range    POCT Glucose 177 (H) 74 - 99 mg/dL   POCT GLUCOSE   Result Value Ref Range    POCT Glucose 239 (H) 74 - 99 mg/dL   CBC and Auto Differential   Result Value Ref Range    WBC 10.4 4.4 - 11.3 x10*3/uL    nRBC 0.0 0.0 - 0.0 /100 WBCs    RBC 2.68 (L) 4.00 - 5.20 x10*6/uL    Hemoglobin 8.2 (L) 12.0 - 16.0 g/dL    Hematocrit 26.8 (L) 36.0 - 46.0 %     80 - 100 fL    MCH 30.6 26.0 - 34.0 pg    MCHC 30.6 (L) 32.0  - 36.0 g/dL    RDW 13.6 11.5 - 14.5 %    Platelets 156 150 - 450 x10*3/uL    Neutrophils % 78.0 40.0 - 80.0 %    Immature Granulocytes %, Automated 1.9 (H) 0.0 - 0.9 %    Lymphocytes % 11.1 13.0 - 44.0 %    Monocytes % 7.8 2.0 - 10.0 %    Eosinophils % 0.7 0.0 - 6.0 %    Basophils % 0.5 0.0 - 2.0 %    Neutrophils Absolute 8.11 (H) 1.20 - 7.70 x10*3/uL    Immature Granulocytes Absolute, Automated 0.20 0.00 - 0.70 x10*3/uL    Lymphocytes Absolute 1.15 (L) 1.20 - 4.80 x10*3/uL    Monocytes Absolute 0.81 0.10 - 1.00 x10*3/uL    Eosinophils Absolute 0.07 0.00 - 0.70 x10*3/uL    Basophils Absolute 0.05 0.00 - 0.10 x10*3/uL   Renal function panel   Result Value Ref Range    Glucose 143 (H) 74 - 99 mg/dL    Sodium 135 (L) 136 - 145 mmol/L    Potassium 4.0 3.5 - 5.3 mmol/L    Chloride 98 98 - 107 mmol/L    Bicarbonate 31 21 - 32 mmol/L    Anion Gap 10 10 - 20 mmol/L    Urea Nitrogen 18 6 - 23 mg/dL    Creatinine 2.29 (H) 0.50 - 1.05 mg/dL    eGFR 22 (L) >60 mL/min/1.73m*2    Calcium 7.4 (L) 8.6 - 10.3 mg/dL    Phosphorus 1.8 (L) 2.5 - 4.9 mg/dL    Albumin 2.6 (L) 3.4 - 5.0 g/dL   Magnesium   Result Value Ref Range    Magnesium 1.85 1.60 - 2.40 mg/dL   POCT GLUCOSE   Result Value Ref Range    POCT Glucose 217 (H) 74 - 99 mg/dL   POCT GLUCOSE   Result Value Ref Range    POCT Glucose 188 (H) 74 - 99 mg/dL   CBC and Auto Differential   Result Value Ref Range    WBC 11.3 4.4 - 11.3 x10*3/uL    nRBC 0.0 0.0 - 0.0 /100 WBCs    RBC 2.73 (L) 4.00 - 5.20 x10*6/uL    Hemoglobin 8.4 (L) 12.0 - 16.0 g/dL    Hematocrit 27.3 (L) 36.0 - 46.0 %     80 - 100 fL    MCH 30.8 26.0 - 34.0 pg    MCHC 30.8 (L) 32.0 - 36.0 g/dL    RDW 13.5 11.5 - 14.5 %    Platelets 179 150 - 450 x10*3/uL    Neutrophils % 81.8 40.0 - 80.0 %    Immature Granulocytes %, Automated 1.8 (H) 0.0 - 0.9 %    Lymphocytes % 8.9 13.0 - 44.0 %    Monocytes % 6.4 2.0 - 10.0 %    Eosinophils % 0.5 0.0 - 6.0 %    Basophils % 0.6 0.0 - 2.0 %    Neutrophils Absolute 9.26 (H)  1.20 - 7.70 x10*3/uL    Immature Granulocytes Absolute, Automated 0.20 0.00 - 0.70 x10*3/uL    Lymphocytes Absolute 1.01 (L) 1.20 - 4.80 x10*3/uL    Monocytes Absolute 0.73 0.10 - 1.00 x10*3/uL    Eosinophils Absolute 0.06 0.00 - 0.70 x10*3/uL    Basophils Absolute 0.07 0.00 - 0.10 x10*3/uL   Renal function panel   Result Value Ref Range    Glucose 185 (H) 74 - 99 mg/dL    Sodium 133 (L) 136 - 145 mmol/L    Potassium 4.1 3.5 - 5.3 mmol/L    Chloride 97 (L) 98 - 107 mmol/L    Bicarbonate 30 21 - 32 mmol/L    Anion Gap 10 10 - 20 mmol/L    Urea Nitrogen 30 (H) 6 - 23 mg/dL    Creatinine 3.12 (H) 0.50 - 1.05 mg/dL    eGFR 16 (L) >60 mL/min/1.73m*2    Calcium 7.3 (L) 8.6 - 10.3 mg/dL    Phosphorus 2.2 (L) 2.5 - 4.9 mg/dL    Albumin 2.6 (L) 3.4 - 5.0 g/dL   Magnesium   Result Value Ref Range    Magnesium 1.77 1.60 - 2.40 mg/dL   POCT GLUCOSE   Result Value Ref Range    POCT Glucose 162 (H) 74 - 99 mg/dL   POCT GLUCOSE   Result Value Ref Range    POCT Glucose 135 (H) 74 - 99 mg/dL   POCT GLUCOSE   Result Value Ref Range    POCT Glucose 158 (H) 74 - 99 mg/dL   CBC and Auto Differential   Result Value Ref Range    WBC 9.7 4.4 - 11.3 x10*3/uL    nRBC 0.0 0.0 - 0.0 /100 WBCs    RBC 2.90 (L) 4.00 - 5.20 x10*6/uL    Hemoglobin 8.9 (L) 12.0 - 16.0 g/dL    Hematocrit 28.9 (L) 36.0 - 46.0 %     80 - 100 fL    MCH 30.7 26.0 - 34.0 pg    MCHC 30.8 (L) 32.0 - 36.0 g/dL    RDW 13.5 11.5 - 14.5 %    Platelets 238 150 - 450 x10*3/uL    Neutrophils % 73.1 40.0 - 80.0 %    Immature Granulocytes %, Automated 2.6 (H) 0.0 - 0.9 %    Lymphocytes % 14.5 13.0 - 44.0 %    Monocytes % 8.1 2.0 - 10.0 %    Eosinophils % 0.8 0.0 - 6.0 %    Basophils % 0.9 0.0 - 2.0 %    Neutrophils Absolute 7.05 1.20 - 7.70 x10*3/uL    Immature Granulocytes Absolute, Automated 0.25 0.00 - 0.70 x10*3/uL    Lymphocytes Absolute 1.40 1.20 - 4.80 x10*3/uL    Monocytes Absolute 0.78 0.10 - 1.00 x10*3/uL    Eosinophils Absolute 0.08 0.00 - 0.70 x10*3/uL     Basophils Absolute 0.09 0.00 - 0.10 x10*3/uL   Renal function panel   Result Value Ref Range    Glucose 143 (H) 74 - 99 mg/dL    Sodium 133 (L) 136 - 145 mmol/L    Potassium 4.4 3.5 - 5.3 mmol/L    Chloride 97 (L) 98 - 107 mmol/L    Bicarbonate 29 21 - 32 mmol/L    Anion Gap 11 10 - 20 mmol/L    Urea Nitrogen 40 (H) 6 - 23 mg/dL    Creatinine 3.54 (H) 0.50 - 1.05 mg/dL    eGFR 13 (L) >60 mL/min/1.73m*2    Calcium 7.7 (L) 8.6 - 10.3 mg/dL    Phosphorus 2.3 (L) 2.5 - 4.9 mg/dL    Albumin 2.7 (L) 3.4 - 5.0 g/dL   Magnesium   Result Value Ref Range    Magnesium 1.80 1.60 - 2.40 mg/dL   POCT GLUCOSE   Result Value Ref Range    POCT Glucose 191 (H) 74 - 99 mg/dL     *Note: Due to a large number of results and/or encounters for the requested time period, some results have not been displayed. A complete set of results can be found in Results Review.         SIGNATURE: Joseph Mitchell MD PATIENT NAME: Daphne Morris   DATE: January 21, 2025 MRN: [id number]   TIME: 11:57 AM PAGER: [phone]

## 2025-01-21 NOTE — RESEARCH NOTES
Artificial Intelligence Monitoring in Nursing (AIMS Nursing) Study    Principle Investigator - Dr. Luis Shay  Research Coordinator - MARIVEL Brumfield     Patient Name - Daphne Morris  Date - 1/21/2025 2:32 PM  Location - [facility]    Daphne Morris was approached by MARIVEL Brumfield to talk about participating in the AIMS Nursing Study. The patient was approached, they are expecting to be discharged today. Study protocol was followed and patient was given study contact information.     MARIVEL Brumfield

## 2025-01-21 NOTE — PROGRESS NOTES
01/21/25 1319   Discharge Planning   Expected Discharge Disposition SNF  ([facility])     7000 Completed   AVS and goldenrod sent to facility   Patient and nephew augusta wilson   RN made aware and given report number

## 2025-01-21 NOTE — CARE PLAN
The patient's goals for the shift include      The clinical goals for the shift include safety      Problem: Skin  Goal: Decreased wound size/increased tissue granulation at next dressing change  Outcome: Progressing  Flowsheets (Taken 1/21/2025 1130)  Decreased wound size/increased tissue granulation at next dressing change:   Promote sleep for wound healing   Utilize specialty bed per algorithm   Protective dressings over bony prominences  Goal: Participates in plan/prevention/treatment measures  Outcome: Progressing  Flowsheets (Taken 1/21/2025 1130)  Participates in plan/prevention/treatment measures:   Discuss with provider PT/OT consult   Elevate heels   Increase activity/out of bed for meals  Goal: Prevent/manage excess moisture  Outcome: Progressing  Flowsheets (Taken 1/21/2025 1130)  Prevent/manage excess moisture:   Cleanse incontinence/protect with barrier cream   Moisturize dry skin   Use wicking fabric (obtain order)   Follow provider orders for dressing changes   Monitor for/manage infection if present  Goal: Prevent/minimize sheer/friction injuries  Outcome: Progressing  Flowsheets (Taken 1/21/2025 1130)  Prevent/minimize sheer/friction injuries:   Complete micro-shifts as needed if patient unable. Adjust patient position to relieve pressure points, not a full turn   Increase activity/out of bed for meals   Use pull sheet   HOB 30 degrees or less   Turn/reposition every 2 hours/use positioning/transfer devices   Utilize specialty bed per algorithm  Goal: Promote/optimize nutrition  Outcome: Progressing  Flowsheets (Taken 1/21/2025 1130)  Promote/optimize nutrition:   Assist with feeding   Monitor/record intake including meals   Consume > 50% meals/supplements   Offer water/supplements/favorite foods  Goal: Promote skin healing  Outcome: Progressing  Flowsheets (Taken 1/21/2025 1130)  Promote skin healing:   Assess skin/pad under line(s)/device(s)   Protective dressings over bony prominences    Turn/reposition every 2 hours/use positioning/transfer devices   Ensure correct size (line/device) and apply per  instructions   Rotate device position/do not position patient on device     Problem: Fall/Injury  Goal: Not fall by end of shift  Outcome: Progressing  Goal: Be free from injury by end of the shift  Outcome: Progressing  Goal: Verbalize understanding of personal risk factors for fall in the hospital  Outcome: Progressing  Goal: Verbalize understanding of risk factor reduction measures to prevent injury from fall in the home  Outcome: Progressing  Goal: Use assistive devices by end of the shift  Outcome: Progressing  Goal: Pace activities to prevent fatigue by end of the shift  Outcome: Progressing     Problem: Nutrition  Goal: Oral intake greater 75%  Outcome: Progressing  Goal: Consume prescribed supplement  Outcome: Progressing  Goal: Promote healing  Outcome: Progressing     Problem: Dialysis  Goal: I will slow progression of end-stage renal disease through participating in dialysis 3 times a week  Outcome: Progressing     Problem: Respiratory  Goal: Minimal/no exertional discomfort or dyspnea this shift  Outcome: Progressing  Goal: No signs of respiratory distress (eg. Use of accessory muscles. Peds grunting)  Outcome: Progressing  Goal: Patent airway maintained this shift  Outcome: Progressing  Goal: Verbalize decreased shortness of breath this shift  Outcome: Progressing  Goal: Wean oxygen to maintain O2 saturation per order/standard this shift  Outcome: Progressing     Problem: Bathing  Goal: STG - Patient will bathe upper body with minimal assist  Outcome: Progressing     Problem: Dressing Upper Extremities  Goal: LTG - Patient will complete upper body dressing with set up  Outcome: Progressing     Problem: Grooming  Goal: STG - Patient completes grooming with set up  Outcome: Progressing     Problem: Toileting  Goal: STG - Patient will complete toileting tasks with 2ww and minimal  assist  Outcome: Progressing     Problem: Pain - Adult  Goal: Verbalizes/displays adequate comfort level or baseline comfort level  Outcome: Progressing     Problem: Safety - Adult  Goal: Free from fall injury  Outcome: Progressing     Problem: Discharge Planning  Goal: Discharge to home or other facility with appropriate resources  Outcome: Progressing     Problem: Chronic Conditions and Co-morbidities  Goal: Patient's chronic conditions and co-morbidity symptoms are monitored and maintained or improved  Outcome: Progressing     Problem: Diabetes  Goal: Achieve decreasing blood glucose levels by end of shift  Outcome: Progressing  Goal: Increase stability of blood glucose readings by end of shift  Outcome: Progressing  Goal: Decrease in ketones present in urine by end of shift  Outcome: Progressing  Goal: Maintain electrolyte levels within acceptable range throughout shift  Outcome: Progressing  Goal: Maintain glucose levels >70mg/dl to <250mg/dl throughout shift  Outcome: Progressing  Goal: No changes in neurological exam by end of shift  Outcome: Progressing  Goal: Learn about and adhere to nutrition recommendations by end of shift  Outcome: Progressing  Goal: Vital signs within normal range for age by end of shift  Outcome: Progressing  Goal: Increase self care and/or family involovement by end of shift  Outcome: Progressing  Goal: Receive DSME education by end of shift  Outcome: Progressing

## 2025-01-21 NOTE — TELEPHONE ENCOUNTER
Plan to discharge to [facility] today. Per Nephrology: s/p stent placement with significant bruising around her fistula. We are giving it a rest and using a tunneled catheter. For OP vascular follow-up within 1 to 2 weeks of discharge.

## 2025-01-21 NOTE — PROGRESS NOTES
Occupational Therapy    OT Treatment    Patient Name: Daphne Morris  MRN: [id number]  Department: [address]  Room: [address] [place] [address]  Today's Date: 1/21/2025  Time Calculation  Start Time: 1307  Stop Time: 1330  Time Calculation (min): 23 min        Assessment:  OT Assessment: pt limited by fatigue and fear of falling but is making gradual progress towards OT POC. Pt would benefit from continued OT services to increase strentgh, endurance, and activity tolerance for ADLs.  Prognosis: Fair  Barriers to Discharge Home: Caregiver assistance, Physical needs  Caregiver Assistance: Patient lives alone and/or does not have reliable caregiver assistance  Physical Needs: Stair navigation into home limited by function/safety, Ambulating household distances limited by function/safety, 24hr mobility assistance needed, 24hr ADL assistance needed  Evaluation/Treatment Tolerance: Patient limited by fatigue  Medical Staff Made Aware: Yes  End of Session Communication: Bedside nurse  End of Session Patient Position: Bed, 3 rail up, Alarm on (call light in reach and all needs met; RN made aware of OT session and pt position after session)  OT Assessment Results: Decreased ADL status, Decreased endurance, Decreased functional mobility, Decreased upper extremity strength, Decreased IADLs  Prognosis: Fair  Barriers to Discharge: Decreased caregiver support  Evaluation/Treatment Tolerance: Patient limited by fatigue  Medical Staff Made Aware: Yes  Strengths: Ability to acquire knowledge, Attitude of self  Barriers to Participation: Comorbidities  Plan:  Treatment Interventions: ADL retraining, Functional transfer training, UE strengthening/ROM, Endurance training, Patient/family training, Equipment evaluation/education, Compensatory technique education  OT Frequency: 4 times per week  OT Discharge Recommendations: Moderate intensity level of continued care  Equipment Recommended upon Discharge: Wheeled walker  OT Recommended Transfer Status:  Moderate assist, Assist of 2  OT - OK to Discharge: Yes  Treatment Interventions: ADL retraining, Functional transfer training, UE strengthening/ROM, Endurance training, Patient/family training, Equipment evaluation/education, Compensatory technique education    Subjective   Previous Visit Info:  OT Last Visit  OT Received On: 01/21/25  General:  General  Reason for Referral: activities of daily living, clotted renal dialysis AV graft  Referred By: CARLENE Saini-CNP  Past Medical History Relevant to Rehab: ESRD, anemia, afib, chf, copd, CVA, depression dm, chronic hypoxic respiratory failure on 3L O2 at home, gerd hyperlipidemia, hypertension, hypothyroid, renal cell carcinoma s/p resection in 8/21 and vascultitis  Prior to Session Communication: Bedside nurse  Patient Position Received: Bed, 3 rail up, Alarm on  Preferred Learning Style: verbal, visual  General Comment: pt cleared by nurse for OT treatment; pt pleasant and agreeable to therapy  Precautions:  Hearing/Visual Limitations: [facility] wears bilateral hearing aides, wears glasses  Medical Precautions: Fall precautions, Oxygen therapy device and L/min  Precautions Comment: R UE limb precaution    Pain:  Pain Assessment  Pain Assessment: 0-10  0-10 (Numeric) Pain Score: 0 - No pain    Objective    Cognition:  Cognition  Orientation Level: Oriented X4  Insight: Mild  Impulsive: Within functional limits  Task Initiation: Initiates with cues    Coordination:  Movements are Fluid and Coordinated: No  Upper Body Coordination: slower rate of movement    Activities of Daily Living:      Grooming  Grooming Comments: pt declined all grooming and bathing tasks on this date    UE Dressing  UE Dressing Level of Assistance: Minimum assistance  UE Dressing Where Assessed: Edge of bed  UE Dressing Comments: pt able to don/doff hospital gown while seated on EOB  with min A to fully thread RUE; completed with increased time and effort    LE Dressing  LE Dressing: Yes  Sock  "Level of Assistance: Maximum assistance  LE Dressing Where Assessed: Edge of bed  LE Dressing Comments: pt educated on figure four technqiue while seated on EOB; pt able to place sock onto BLEs but required max A to fully pull up and adjust; completed with increased time and effort      Bed Mobility/Transfers:   Bed Mobility  Bed Mobility: Yes  Bed Mobility 1  Bed Mobility 1: Supine to sitting  Level of Assistance 1: Moderate assistance  Bed Mobility Comments 1: mod A to bring BLEs to EOB and for trunk up into sitting; verbal cues throughout for sequencing and to use hand rails  Bed Mobility 2  Bed Mobility  2: Scooting  Level of Assistance 2: Maximum assistance  Bed Mobility Comments 2: use of draw sheet to square hips towards EOB  Bed Mobility 3  Bed Mobility 3: Sitting to supine  Level of Assistance 3: Moderate assistance  Bed Mobility Comments 3: Mod A to bring BLEs into supine; verbal cues throughout for optimal body alignement once supine    Transfers  Transfer: No (attempted transfer but pt stated she was fatigued and was \"terrified\" of falling; educated on benefits of movement and transfer task but pt refused)    Functional Mobility:  Functional Mobility  Functional Mobility Performed: No    Sitting Balance:  Static Sitting Balance  Static Sitting-Balance Support: Feet supported  Static Sitting-Level of Assistance: Close supervision  Static Sitting-Comment/Number of Minutes: fair+ seated balance while on EOB  Dynamic Sitting Balance  Dynamic Sitting-Balance Support: Feet supported  Dynamic Sitting-Level of Assistance: Contact guard  Dynamic Sitting-Balance: Forward lean, Reaching for objects  Dynamic Sitting-Comments: ADL tasks while seated on EOB with CGA for balance and safety      Outcome Measures:  [facility] Daily Activity  Putting on and taking off regular lower body clothing: A lot  Bathing (including washing, rinsing, drying): A lot  Putting on and taking off regular upper body clothing: A " little  Toileting, which includes using toilet, bedpan or urinal: A lot  Taking care of personal grooming such as brushing teeth: A little  Eating Meals: A little  Daily Activity - Total Score: 15        Education Documentation  Body Mechanics, taught by JERROD Rodarte at 1/21/2025  2:25 PM.  Learner: Patient  Readiness: Acceptance  Method: Explanation  Response: Verbalizes Understanding  Comment: pt educated on OT POC, body mechanics, safety techniques, and precautions during ADL retraining and mobility    Precautions, taught by JERROD Rodarte at 1/21/2025  2:25 PM.  Learner: Patient  Readiness: Acceptance  Method: Explanation  Response: Verbalizes Understanding  Comment: pt educated on OT POC, body mechanics, safety techniques, and precautions during ADL retraining and mobility    ADL Training, taught by JERROD Rodarte at 1/21/2025  2:25 PM.  Learner: Patient  Readiness: Acceptance  Method: Explanation  Response: Verbalizes Understanding  Comment: pt educated on OT POC, body mechanics, safety techniques, and precautions during ADL retraining and mobility      Goals:  Encounter Problems       Encounter Problems (Active)       Bathing       STG - Patient will bathe upper body with minimal assist (Progressing)       Start:  01/11/25    Expected End:  01/22/25               Dressing Upper Extremities       LTG - Patient will complete upper body dressing with set up (Progressing)       Start:  01/11/25    Expected End:  01/22/25               Grooming       STG - Patient completes grooming with set up (Progressing)       Start:  01/11/25    Expected End:  01/22/25               OT Transfers       LTG - Patient will perform all functional transfers with 2ww and minimal assist (Progressing)       Start:  01/11/25    Expected End:  02/11/25               Toileting       STG - Patient will complete toileting tasks with 2ww and minimal assist (Progressing)       Start:  01/11/25    Expected End:  01/22/25

## 2025-01-21 NOTE — DISCHARGE SUMMARY
"Discharge Diagnosis  Clotted renal dialysis AV graft (CMS-[facility]ly [facility])    Issues Requiring Follow-Up  Follow-up with nephrology patient on dialysis Tuesday Thursday and Saturday  Follow-up with PCP  Follow-up with her cardiologist    Discharge Meds     Medication List      CONTINUE taking these medications     acetaminophen 500 mg tablet; Commonly known as: Tylenol   albuterol 90 mcg/actuation inhaler; INHALE 2 PUFFS BY MOUTH EVERY 4   HOURS AS NEEDED   amiodarone 200 mg tablet; Commonly known as: Pacerone; TAKE 1 TABLET BY   MOUTH ONCE DAILY WITH BREAKFAST.   aspirin 81 mg EC tablet   atorvastatin 40 mg tablet; Commonly known as: Lipitor; TAKE ONE TABLET   BY MOUTH EVERY DAY   BD Calista 2nd Gen Pen Needle 32 gauge x 5/32\" needle; Generic drug: pen   needle, diabetic; USE SUBCUTANEOUSLY AS DIRECTED TWICE DAILY   clopidogrel 75 mg tablet; Commonly known as: Plavix   ferrous gluconate 324 (38 Fe) mg tablet; Commonly known as: Fergon; Take   1 tablet (324 mg) by mouth once daily with breakfast.   FreeStyle Shakir 14 Day Sensor kit; Generic drug: flash glucose sensor   kit; USE AS DIRECTED TO CHECK SUGARS 3 TO 4 TIMES DAILY   FreeStyle Shakir 2 [place] misc; Generic drug: flash glucose scanning   reader; Use as instructed   FreeStyle Shakir 3 [place] misc; Generic drug: blood-glucose   meter,continuous; Use as instructed   FreeStyle Shakir 3 Sensor device; Generic drug: blood-glucose sensor;   Change sensor Q 14 days   HumaLOG KwikPen Insulin 100 unit/mL injection; Generic drug: insulin   lispro; up to 15 units Subcutaneous three times a day per sliding scaleup   to 15 units Subcutaneous three times a day per sliding scale   ipratropium-albuteroL 0.5-2.5 mg/3 mL nebulizer solution; Commonly known   as: Duo-Neb; INHALE THE CONTENTS OF 1 VIAL VIA NEBULIZER EVERY 6 HOURS AS   NEEDED.   * levothyroxine 50 mcg tablet; Commonly known as: Synthroid, Levoxyl;   Take 1 tablet (50 mcg) by mouth once daily in the morning. Take before   meals.   * " levothyroxine 200 mcg tablet; Commonly known as: Synthroid, Levoxyl;   Take 1 tablet by mouth once daily in the morning before a meal.   pantoprazole 40 mg EC tablet; Commonly known as: ProtoNix; TAKE ONE   TABLET BY MOUTH TWO TIMES A DAY   venlafaxine XR 75 mg 24 hr capsule; Commonly known as: Effexor-XR; Take   2 capsules (150 mg) by mouth once daily.  * This list has 2 medication(s) that are the same as other medications   prescribed for you. Read the directions carefully, and ask your doctor or   other care provider to review them with you.     STOP taking these medications     amLODIPine 5 mg tablet; Commonly known as: Norvasc   metoprolol succinate XL 50 mg 24 hr tablet; Commonly known as: Toprol-XL   oxygen DME/Hospice therapy; Commonly known as: O2       Test Results Pending At Discharge  Pending Labs       No current pending labs.            Hospital Course  HPI from admission:  Daphne Morris is a 69 y.o. female presenting with clotted AV fistula.  Patient was admitted at [facility] yesterday and transferred here because of clotted AV fistula.  Upon arrival to [facility] patient was lethargic and not responding to simple questions.  CT of the head was ordered.  Blood gas was ordered and showed a pH of 7.24 with a pCO2 of 70 and we will be transferring the admission to ICU.  She normally wears 3 L of oxygen at home in the evening.  Patient will find to stimuli but does not answer questions.  I spoke with the son who confirmed that she is DNR/DNI.  Son was also updated on the patient's condition.    Brief Hospital course:  This is a 69-year-old female on dialysis who was admitted with clotted renal dialysis Arteriovenous graft and metabolic encephalopathy.   Evaluated by nephrology Dr. Mitchell, tunneled line was placed.  Patient has to follow-up with vascular surgeon for dialysis access.   Patient has to follow-up with her cardiology I will resume her amiodarone and metoprolol, aspirin and Plavix I  held amlodipine.  Patient is hemodynamically stable.  To follow-up with her attending physician at nursing home    Pertinent Physical Exam At Time of Discharge  Physical Exam  Vitals and nursing note reviewed.   Constitutional:       Appearance: Normal appearance.   HENT:      Head: Normocephalic and atraumatic.      Mouth/Throat:      Mouth: Mucous membranes are moist.   Cardiovascular:      Rate and Rhythm: Normal rate.   Pulmonary:      Effort: Pulmonary effort is normal.      Breath sounds: Normal breath sounds.   Abdominal:      General: Bowel sounds are normal.      Palpations: Abdomen is soft.   Musculoskeletal:         General: Normal range of motion.      Cervical back: Normal range of motion and neck supple.   Skin:     General: Skin is warm.   Neurological:      General: No focal deficit present.      Mental Status: She is alert and oriented to person, place, and time.   Psychiatric:         Mood and Affect: Mood normal.      Outpatient Follow-Up  Future Appointments   Date Time Provider Department Center   1/29/2025  2:00 PM PRESTON SARAVIA 107 VASCULAR 2 [id number] [facility]   1/29/2025  3:20 PM Polo Luna MD [id number] [facility]         CARLENE Nelson-CNP

## 2025-01-21 NOTE — ASSESSMENT & PLAN NOTE
Followed by nephrology (Dr. Mitchell)  patient was able to get dialysis on Tuesday but not on Thursday . Dialysis days are Tuesday, Thursday, and Saturday    Respiratory acidosis with acute on chronic hypoxic respiratory failure  Much improved has been transferred out of ICU  Patient at baseline on 3 L of nasal cannula with Bipap at night    Metabolic encephalopathy  resolved  Monitor closely    Hypertension  Continue home amlodipine    Atrial fibrillation  Not on anticoagulation  S/p Watchman 2023  Continue with BB and amiodarone     Hyperlipidemia  Continue home atorvastatin    Hypothyroid  Continue home levothyroxine    Chronic diastolic heart failure  Bradycardia  resolved    Type 2 diabetes mellitus  Hypoglycemia protocol  Accu-Cheks before meals and at bedtime  Insulin sliding scale  Hemoglobin A1c 8.1 on January 1, 2025    Disposition    Anticipated discharge to SNF.

## 2025-01-22 ENCOUNTER — APPOINTMENT (OUTPATIENT)
Dept: VASCULAR SURGERY | Facility: CLINIC | Age: 70
End: 2025-01-22
Payer: MEDICARE

## 2025-01-22 ENCOUNTER — APPOINTMENT (OUTPATIENT)
Dept: VASCULAR MEDICINE | Facility: CLINIC | Age: 70
End: 2025-01-22
Payer: MEDICARE

## 2025-01-22 ENCOUNTER — TELEPHONE (OUTPATIENT)
Dept: INPATIENT UNIT | Facility: HOSPITAL | Age: 70
End: 2025-01-22
Payer: MEDICARE

## 2025-01-22 NOTE — TELEPHONE ENCOUNTER
Patient discharged 1/21/25 from [facility] to [facility].

## 2025-01-24 ENCOUNTER — TELEPHONE (OUTPATIENT)
Dept: PRIMARY CARE | Facility: CLINIC | Age: 70
End: 2025-01-24
Payer: MEDICARE

## 2025-01-26 ENCOUNTER — NURSING HOME VISIT (OUTPATIENT)
Dept: POST ACUTE CARE | Facility: EXTERNAL LOCATION | Age: 70
End: 2025-01-26
Payer: MEDICARE

## 2025-01-26 DIAGNOSIS — K21.9 GASTROESOPHAGEAL REFLUX DISEASE WITHOUT ESOPHAGITIS: ICD-10-CM

## 2025-01-26 DIAGNOSIS — R09.02 HYPOXIA: ICD-10-CM

## 2025-01-26 DIAGNOSIS — I10 HYPERTENSION, UNSPECIFIED TYPE: ICD-10-CM

## 2025-01-26 DIAGNOSIS — Z79.51 COPD ON LONG-TERM INHALED STEROID THERAPY (MULTI): Primary | ICD-10-CM

## 2025-01-26 DIAGNOSIS — E78.5 HYPERLIPIDEMIA, UNSPECIFIED HYPERLIPIDEMIA TYPE: ICD-10-CM

## 2025-01-26 DIAGNOSIS — J10.1 INFLUENZA A: ICD-10-CM

## 2025-01-26 DIAGNOSIS — J44.9 COPD ON LONG-TERM INHALED STEROID THERAPY (MULTI): Primary | ICD-10-CM

## 2025-01-26 DIAGNOSIS — L97.921 NON-PRESSURE CHRONIC ULCER OF LEFT LOWER LEG, LIMITED TO BREAKDOWN OF SKIN: ICD-10-CM

## 2025-01-26 DIAGNOSIS — C64.1 MALIGNANT NEOPLASM OF RIGHT KIDNEY (MULTI): ICD-10-CM

## 2025-01-26 DIAGNOSIS — I48.91 ATRIAL FIBRILLATION, UNSPECIFIED TYPE (MULTI): ICD-10-CM

## 2025-01-26 DIAGNOSIS — I50.9 CONGESTIVE HEART FAILURE, UNSPECIFIED HF CHRONICITY, UNSPECIFIED HEART FAILURE TYPE: ICD-10-CM

## 2025-01-26 DIAGNOSIS — I63.9 CEREBROVASCULAR ACCIDENT (CVA), UNSPECIFIED MECHANISM (MULTI): ICD-10-CM

## 2025-01-26 DIAGNOSIS — F32.A DEPRESSION, UNSPECIFIED DEPRESSION TYPE: ICD-10-CM

## 2025-01-26 DIAGNOSIS — N18.6 ESRD (END STAGE RENAL DISEASE) (MULTI): ICD-10-CM

## 2025-01-26 DIAGNOSIS — G93.40 ENCEPHALOPATHY, UNSPECIFIED TYPE: ICD-10-CM

## 2025-01-26 DIAGNOSIS — R53.1 WEAKNESS: ICD-10-CM

## 2025-01-26 DIAGNOSIS — I69.351 HEMIPLEGIA AND HEMIPARESIS FOLLOWING CEREBRAL INFARCTION AFFECTING RIGHT DOMINANT SIDE (MULTI): ICD-10-CM

## 2025-01-26 DIAGNOSIS — F33.1 MODERATE EPISODE OF RECURRENT MAJOR DEPRESSIVE DISORDER: ICD-10-CM

## 2025-01-26 DIAGNOSIS — Z91.81 AT RISK FOR FALLING: ICD-10-CM

## 2025-01-26 DIAGNOSIS — J90 PLEURAL EFFUSION ON RIGHT: ICD-10-CM

## 2025-01-26 PROCEDURE — 99309 SBSQ NF CARE MODERATE MDM 30: CPT | Performed by: INTERNAL MEDICINE

## 2025-01-26 NOTE — LETTER
Patient: Daphne Morris  : 1955    Encounter Date: 2025    PLACE OF SERVICE:  [facility] [place]    This is a subsequent visit.    Subjective  Patient ID: Daphne Morris is a 69 y.o. female who presents for Follow-up.    Ms. Cecelia Morris is a 69-year-old female with recent history of influenza A with acute hypoxic respiratory failure with encephalopathy.  She has a history of end-stage renal disease, on hemodialysis, with recent clotting of her dialysis catheter.  This was declotted.  She requires supportive care.    Review of Systems   Constitutional:  Negative for chills and fever.   Cardiovascular:  Negative for chest pain.   All other systems reviewed and are negative.    Objective  /84   Pulse 82   Temp 36.8 °C (98.3 °F)   Resp 18     Physical Exam  Vitals reviewed.   Constitutional:       General: She is not in acute distress.     Comments: This is a well-developed, well-nourished female, sitting in a chair   HENT:      Right Ear: Tympanic membrane, ear canal and external ear normal.      Left Ear: Tympanic membrane, ear canal and external ear normal.   Eyes:      General: No scleral icterus.     Pupils: Pupils are equal, round, and reactive to light.   Neck:      Vascular: No carotid bruit.   Cardiovascular:      Heart sounds: Normal heart sounds, S1 normal and S2 normal. No murmur heard.     No friction rub.   Pulmonary:      Breath sounds: Decreased breath sounds (throughout) present.   Abdominal:      Palpations: There is no hepatomegaly, splenomegaly or mass.   Musculoskeletal:         General: No swelling or deformity. Normal range of motion.      Cervical back: Neck supple.      Right lower leg: Edema present.      Left lower leg: Edema present.   Lymphadenopathy:      Cervical: No cervical adenopathy.      Upper Body:      Right upper body: No axillary adenopathy.      Left upper body: No axillary adenopathy.      Lower Body: No right inguinal  adenopathy. No left inguinal adenopathy.   Neurological:      Mental Status: She is oriented to person, place, and time.      Cranial Nerves: Cranial nerves 2-12 are intact. No cranial nerve deficit.      Sensory: No sensory deficit.      Motor: Weakness (right-sided) present.      Gait: Gait is intact.      Deep Tendon Reflexes: Reflexes normal.   Psychiatric:         Mood and Affect: Mood normal. Mood is not anxious or depressed. Affect is not angry.         Behavior: Behavior is not agitated.         Thought Content: Thought content normal.         Judgment: Judgment normal.     LAB WORK: Laboratory studies were reviewed.    Assessment/Plan  Problem List Items Addressed This Visit             ICD-10-CM       Advance Directives and General Issues    At risk for falling Z91.81       Cardiac and Vasculature    Hypertension I10    Hyperlipidemia, unspecified E78.5       Gastrointestinal and Abdominal    Gastroesophageal reflux disease K21.9       Genitourinary and Reproductive    ESRD (end stage renal disease) (Multi) N18.6       Neuro    Cerebrovascular accident (CVA) (Multi) I63.9     Other Visit Diagnoses         Codes    COPD on long-term inhaled steroid therapy (Multi)    -  Primary J44.9, Z79.51    Hypoxia     R09.02    Encephalopathy, unspecified type     G93.40    Depression, unspecified depression type     F32.A    Pleural effusion on right     J90    Influenza A     J10.1    Atrial fibrillation, unspecified type (Multi)     I48.91    Congestive heart failure, unspecified HF chronicity, unspecified heart failure type     I50.9    Weakness     R53.1        1. COPD, on bronchodilator therapy.  2. Hypoxia, on oxygen.  3. Encephalopathy, on supportive care.  4. CVA, on supportive care.  5. Depression, on medication.  6. Pleural effusion, follow chest x-ray.  7. Recent influenza A, continue to monitor.  8. End-stage renal disease, on hemodialysis.  9. Atrial fibrillation, rate controlled.  10. Hypertension, med  controlled.  11. Heart failure, on diuretic.  12. Hyperlipidemia, on statin.  13. GERD, on PPI.   14. Weakness, on PT/OT.  15. Fall risk, on fall precaution.    Scribe Attestation  By signing my name below, IAnn Scribe attest that this documentation has been prepared under the direction and in the presence of Cale Lowe MD.       Electronically Signed By: Cale Lowe MD   2/6/25  9:05 AM

## 2025-01-27 NOTE — TELEPHONE ENCOUNTER
Call returned. Message left with information that patient is currently in rehab/skilled care at [facility] in [place].

## 2025-01-28 ENCOUNTER — NURSING HOME VISIT (OUTPATIENT)
Dept: POST ACUTE CARE | Facility: EXTERNAL LOCATION | Age: 70
End: 2025-01-28
Payer: MEDICARE

## 2025-01-28 VITALS
DIASTOLIC BLOOD PRESSURE: 84 MMHG | SYSTOLIC BLOOD PRESSURE: 130 MMHG | HEART RATE: 82 BPM | RESPIRATION RATE: 18 BRPM | TEMPERATURE: 98.3 F

## 2025-01-28 DIAGNOSIS — R41.0 DELIRIUM: ICD-10-CM

## 2025-01-28 DIAGNOSIS — I48.91 ATRIAL FIBRILLATION, UNSPECIFIED TYPE (MULTI): ICD-10-CM

## 2025-01-28 DIAGNOSIS — T82.590A: ICD-10-CM

## 2025-01-28 DIAGNOSIS — J96.22 ACUTE ON CHRONIC RESPIRATORY FAILURE WITH HYPOXIA AND HYPERCAPNIA (MULTI): Primary | ICD-10-CM

## 2025-01-28 DIAGNOSIS — F32.A DEPRESSION, UNSPECIFIED DEPRESSION TYPE: ICD-10-CM

## 2025-01-28 DIAGNOSIS — K21.9 GASTROESOPHAGEAL REFLUX DISEASE WITHOUT ESOPHAGITIS: ICD-10-CM

## 2025-01-28 DIAGNOSIS — R53.81 PHYSICAL DECONDITIONING: ICD-10-CM

## 2025-01-28 DIAGNOSIS — N18.6 ESRD (END STAGE RENAL DISEASE) (MULTI): ICD-10-CM

## 2025-01-28 DIAGNOSIS — Z79.4 TYPE 2 DIABETES MELLITUS WITH HYPOGLYCEMIA WITHOUT COMA, WITH LONG-TERM CURRENT USE OF INSULIN: ICD-10-CM

## 2025-01-28 DIAGNOSIS — J96.21 ACUTE ON CHRONIC RESPIRATORY FAILURE WITH HYPOXIA AND HYPERCAPNIA (MULTI): Primary | ICD-10-CM

## 2025-01-28 DIAGNOSIS — E11.649 TYPE 2 DIABETES MELLITUS WITH HYPOGLYCEMIA WITHOUT COMA, WITH LONG-TERM CURRENT USE OF INSULIN: ICD-10-CM

## 2025-01-28 DIAGNOSIS — E03.9 HYPOTHYROIDISM, UNSPECIFIED TYPE: ICD-10-CM

## 2025-01-28 DIAGNOSIS — J90 PLEURAL EFFUSION ON RIGHT: ICD-10-CM

## 2025-01-28 DIAGNOSIS — J10.1 INFLUENZA A: ICD-10-CM

## 2025-01-28 PROCEDURE — 99310 SBSQ NF CARE HIGH MDM 45: CPT | Performed by: NURSE PRACTITIONER

## 2025-01-28 ASSESSMENT — ENCOUNTER SYMPTOMS
FEVER: 0
CHILLS: 0

## 2025-01-28 NOTE — PROGRESS NOTES
PLACE OF SERVICE:  [facility] [place]    This is a subsequent visit.    Subjective   Patient ID: Daphne Morris is a 69 y.o. female who presents for Follow-up.    Ms. Cecelia Morris is a 69-year-old female with recent history of influenza A with acute hypoxic respiratory failure with encephalopathy.  She has a history of end-stage renal disease, on hemodialysis, with recent clotting of her dialysis catheter.  This was declotted.  She requires supportive care.    Review of Systems   Constitutional:  Negative for chills and fever.   Cardiovascular:  Negative for chest pain.   All other systems reviewed and are negative.    Objective   /84   Pulse 82   Temp 36.8 °C (98.3 °F)   Resp 18     Physical Exam  Vitals reviewed.   Constitutional:       General: She is not in acute distress.     Comments: This is a well-developed, well-nourished female, sitting in a chair   HENT:      Right Ear: Tympanic membrane, ear canal and external ear normal.      Left Ear: Tympanic membrane, ear canal and external ear normal.   Eyes:      General: No scleral icterus.     Pupils: Pupils are equal, round, and reactive to light.   Neck:      Vascular: No carotid bruit.   Cardiovascular:      Heart sounds: Normal heart sounds, S1 normal and S2 normal. No murmur heard.     No friction rub.   Pulmonary:      Breath sounds: Decreased breath sounds (throughout) present.   Abdominal:      Palpations: There is no hepatomegaly, splenomegaly or mass.   Musculoskeletal:         General: No swelling or deformity. Normal range of motion.      Cervical back: Neck supple.      Right lower leg: Edema present.      Left lower leg: Edema present.   Lymphadenopathy:      Cervical: No cervical adenopathy.      Upper Body:      Right upper body: No axillary adenopathy.      Left upper body: No axillary adenopathy.      Lower Body: No right inguinal adenopathy. No left inguinal adenopathy.   Neurological:      Mental  Status: She is oriented to person, place, and time.      Cranial Nerves: Cranial nerves 2-12 are intact. No cranial nerve deficit.      Sensory: No sensory deficit.      Motor: Weakness (right-sided) present.      Gait: Gait is intact.      Deep Tendon Reflexes: Reflexes normal.   Psychiatric:         Mood and Affect: Mood normal. Mood is not anxious or depressed. Affect is not angry.         Behavior: Behavior is not agitated.         Thought Content: Thought content normal.         Judgment: Judgment normal.     LAB WORK: Laboratory studies were reviewed.    Assessment/Plan   Problem List Items Addressed This Visit             ICD-10-CM       Advance Directives and General Issues    At risk for falling Z91.81       Cardiac and Vasculature    Hypertension I10    Hyperlipidemia, unspecified E78.5       Gastrointestinal and Abdominal    Gastroesophageal reflux disease K21.9       Genitourinary and Reproductive    ESRD (end stage renal disease) (Multi) N18.6       Neuro    Cerebrovascular accident (CVA) (Multi) I63.9     Other Visit Diagnoses         Codes    COPD on long-term inhaled steroid therapy (Multi)    -  Primary J44.9, Z79.51    Hypoxia     R09.02    Encephalopathy, unspecified type     G93.40    Depression, unspecified depression type     F32.A    Pleural effusion on right     J90    Influenza A     J10.1    Atrial fibrillation, unspecified type (Multi)     I48.91    Congestive heart failure, unspecified HF chronicity, unspecified heart failure type     I50.9    Weakness     R53.1        1. COPD, on bronchodilator therapy.  2. Hypoxia, on oxygen.  3. Encephalopathy, on supportive care.  4. CVA, on supportive care.  5. Depression, on medication.  6. Pleural effusion, follow chest x-ray.  7. Recent influenza A, continue to monitor.  8. End-stage renal disease, on hemodialysis.  9. Atrial fibrillation, rate controlled.  10. Hypertension, med controlled.  11. Heart failure, on diuretic.  12. Hyperlipidemia, on  statin.  13. GERD, on PPI.   14. Weakness, on PT/OT.  15. Fall risk, on fall precaution.    Scribe Attestation  By signing my name below, I, Ann Santos attest that this documentation has been prepared under the direction and in the presence of Cale Lowe MD.     All medical record entries made by the scribe were personally dictated by me I have reviewed the chart and agree the record accurately reflects my personal performance of his history physical examination and management

## 2025-01-28 NOTE — LETTER
Patient: Daphne Morris  : 1955    Encounter Date: 2025    Chief Complaint:   SNF F/U  -Acute on chronic respiratory failure 2/2 recurrent R pleural effusion and Influenza A  -Type II DM with hypoglycemia  -Delirium  -Physical deconditioning/weakness  -Fall  -Clotted renal dialysis AV graft     HPI:   69 year-old female presenting to [facility] ER on 24 s/p fall at home. Pt. did not know how long she was on the ground for, but per record review, there was concern that the time down was about 16 hours. Pt. was also noted to have some confusion. Work-up in ER: UA with > 50 WBC, 6-10 RBC, 1+ bacteria, Flu A positive, Na+ 129, BUn 55, Cr 4.12, Kel 7.5, AST 43, CK 1,137, lactate 1.0, mag 2.19, BNP 1,735, phos 8.8, Hgb 11.2, CT of head and C-spine negative for acute findings, XR of RFA negative for acute findings. She was placed on BiPAP with improvement noted in respiratory. She required D50 for correction of hypoglycemia. She was admitted to [facility] for further evaluation and treatment. Hospital course:    Acute on chronic hypercapnic respiratory failure/Flu A/right pleural effusion-pulmonology and palliative care consult, patient was refusing noninvasive ventilation, underwent R thoracentesis with removal of 1 L of fluid, hypercapnia did improve  Delirium-2/2 hypercapnia, improved at time of discharge  Hypoglycemia-accuchecks, lantus discontinued, c/w sliding scale  Physical deconditioning/weakness-PT/OT evaluations, recommending SNF     Pt. was HDS and discharged to [facility] on 25. Pt. was at dialysis on  and was sent to [facility] ER due to AV graft malfunction. Pt. was evaluated by vascular surgery and was admitted to [facility] and then to [facility] on 1/10. Upon arrival at [facility], pt. was lethargic and not responding to simple questions. CT of head was negative. ABG showed a pH of 7.24 with a pCO2 of 70. She was transferred to ICU. Hospital course:    Clotted renal dialysis  "AV graft-ASA/plavix held, vascular surgery consult, underwent US-guided vascular access into arterial and venous limb of RUE AV graft with administration of thrombocytic into the thrombosed graft on 1/13, tunneled venous central catheter placed into RIJ on 1/15, vascular recommending resting the right arm x 2 weeks and use the tunneled dialysis catheter R chest, OP F/U with vascular surgery in 1 week to assess the graft with the duplex  ESRD-nephrology consult, dialysis using R chest dialysis catheter, RFP monitored   Respiratory acidosis with acute on chronic hypercapnic respiratory failure-supplemental O2 (3 L chronically), BiPAP at HS   Metabolic encephalopathy-2/2 #3  Bradycardia-metoprolol succinate 50 mg BID changed to daily, HR monitored, hold parameters in place     Pt. was HDS and discharged back to [facility] on 1/21/25. Today, patient reports that she is feeling well. She reports appetite as \"fair\". She denies dizziness, HA, SOB, cough, or chest pain. She denies any pain. Staff report no clinical concerns.     ROS:    As above in HPI. Otherwise, all other systems have been reviewed and are negative for complaint.    Medications reviewed and verified in NH chart.     Patient Active Problem List   Diagnosis   • Hypothyroidism   • Acute on chronic diastolic heart failure   • Altered mental status   • Anxiety   • Chronic fatigue   • Constipation by delayed colonic transit   • Type 2 diabetes mellitus   • Diabetic renal disease (Multi)   • Gastroesophageal reflux disease   • History of renal cell carcinoma   • Hypertension   • Peripheral vascular disease (CMS-HCC)   • Leukocytosis   • Lung nodule   • Major depressive disorder, single episode, unspecified   • Osteoarthritis   • Paroxysmal atrial fibrillation (Multi)   • Primary insomnia   • Stage 3b chronic kidney disease (CKD) (Multi)   • Trouble walking   • Hyperlipidemia, unspecified   • ESRD on dialysis (Multi)   • ANDREA (acute kidney injury) (CMS-HCC) "   • ASHD (arteriosclerotic heart disease)   • At risk for falling   • Essential tremor   • Vitamin D deficiency   • Acute on chronic respiratory failure with hypoxia and hypercapnia (Multi)   • Pulmonary hypertension (Multi)   • Pulmonary edema   • Influenza B   • History of right nephrectomy   • Cognitive impairment   • Unspecified open wound, right lower leg, initial encounter   • Chronic kidney disease, stage V (Multi)   • Cellulitis of right lower extremity   • Open wound of right forearm   • Open wound of right upper arm   • Open wound of left lower leg   • Abrasion of buttock, infected   • Acute cystitis with hematuria   • Complication associated with dialysis catheter   • Cellulitis   • Iron deficiency anemia due to chronic blood loss   • Cerebrovascular accident (CVA) (Multi)   • CKD (chronic kidney disease) stage V requiring chronic dialysis (Multi)   • ESRD (end stage renal disease) (Multi)   • End stage renal disease (Multi)   • Influenza   • Recurrent pleural effusion   • Chronic respiratory failure   • Non-pressure chronic ulcer of left lower leg, limited to breakdown of skin   • Hemiplegia and hemiparesis following cerebral infarction affecting right dominant side (Multi)   • Moderate episode of recurrent major depressive disorder   • Malignant neoplasm of right kidney (Multi)        Past Medical History:   Diagnosis Date   • Anal fissure 10/12/2023   • Anemia    • ASHD (arteriosclerotic heart disease)    • At risk for falls    • Atrial fibrillation (Multi)    • AV graft malfunction (CMS-[facility])    • CHF (congestive heart failure)    • CKD (chronic kidney disease)    • COPD (chronic obstructive pulmonary disease) (Multi)    • CVA (cerebral vascular accident) (Multi)     2021- right-sided weakness   • Depression    • Diabetes mellitus (Multi)    • Encephalopathy    • End-stage renal disease on hemodialysis (Multi)    • GERD (gastroesophageal reflux disease)    • H/O pleural effusion    • Hyperlipidemia     • Hypertension    • Hypothyroidism    • Hypoxia    • Impacted cerumen, left ear     Impacted cerumen of left ear   • Influenza A    • Noncompliance    • Osteoarthritis    • Perianal dermatitis 10/12/2023   • Personal history of other diseases of the respiratory system     History of acute bronchitis   • Pleural effusion    • PVD (peripheral vascular disease) (CMS-HCC)    • Renal carcinoma, right (Multi)     s/p partial nephrectomy 2021   • Respiratory failure (Multi)    • Right sided weakness    • TIA (transient ischemic attack)    • Vasculitis (CMS-HCC) 10/12/2023   • Weakness        Past Surgical History:   Procedure Laterality Date   • ANKLE SURGERY         • CARDIAC CATHETERIZATION N/A 2024    Procedure: Right Heart Cath;  Surgeon: Nicholas Antonio MD;  Location: [facility] Cardiac Cath Lab;  Service: Cardiovascular;  Laterality: N/A;   • CATARACT EXTRACTION Right     2019   •  SECTION, CLASSIC     • MR CHEST ANGIO W AND WO IV CONTRAST  2023    MR CHEST ANGIO W AND WO IV CONTRAST 2023 [facility] MRI   • MR HEAD ANGIO WO IV CONTRAST  2021    MR HEAD ANGIO WO IV CONTRAST [facility]   • NEPHRECTOMY  2021   • SHOULDER SURGERY      2009       Family History   Problem Relation Name Age of Onset   • Hypertension Mother     • Diabetes Father     • Heart disease Father     • Cancer Sister     • Cancer Brother     • Diabetes Daughter     • Asthma Daughter     • Heart attack Daughter     • Diabetes Maternal Grandfather     • Heart disease Paternal Grandmother     • Diabetes Other     • Hypertension Other     • COPD Other     • Other (chronic lung disease) Other         Social History     Tobacco Use   Smoking Status Every Day   • Current packs/day: 0.50   • Average packs/day: 0.5 packs/day for 56.1 years (28.1 ttl pk-yrs)   • Types: Cigarettes   • Start date: 1969   • Passive exposure: Never   Smokeless Tobacco Never       Social History     Substance and Sexual  Activity   Alcohol Use Not Currently       Social History     Substance and Sexual Activity   Drug Use Not Currently   • Types: Marijuana       Allergies   Allergen Reactions   • Bee Venom Protein (Honey Bee) Swelling   • Citalopram Hives, Other, Rash and Nausea/vomiting     Facial breakout, blisters, and rash   • Codeine Headache, Other and Itching     Migraines   • Influenza Virus Vaccines Hives   • Latex Other     blisters   • Latex, Natural Rubber Other     blisters   • Lisinopril Swelling and Nausea/vomiting     Facial swelling   • Penicillins Swelling, Headache and Unknown   • Adhesive Tape-Silicones Other     blisters   • Amoxicillin Swelling and Rash   • Doxycycline Rash and Unknown   • Oseltamivir Rash and Nausea/vomiting   • Phenyleph-Min Oil-Petrolatum Other   • Phenyleph-Shark Oil-Glyc-Pet Rash   • Phenylephrine Nausea/vomiting   • Prednisone Other and Nausea/vomiting     Yeast infection   • Tuberculin Ppd Unknown   • Xylometazoline Unknown        Vital Signs:   106/55-98-18-97.9-95% on RA     Physical Exam:  General: Sitting up in WC in NAD, alert   Head/Face: NCAT, symmetrical  Eyes: PERRLA, no injection, no discharge  ENT: Hearing not impaired, ears without scars or lesions, nasal mucosa and turbinates pink, septum midline, lips pink and moist  Neck: Supple, symmetrical  Respiratory: CTA but diminished without adventitious sounds, respirations even and nonlabored without use of accessory muscles, good air exchange  Cardio: Irregular rhythm, normal rate without murmur or gallops, normal S1S2, no edema, pedal pulses 2+/4 bilaterally  Chest/Breast: Symmetrical, tunneled dialysis cath to R chest   GI: BS x 4, normoactive, non-distended, abd round and soft, no masses or tenderness  : No suprapubic tenderness or distention  MSK: Gait not assessed, joints with full ROM without pain or contractures, + generalized weakness  Skin: Skin warm and dry, no induration, dry skin to BLE, R chest dialysis port, RUE  fistula (non-functioning)   Neurologic: Cranial nerves II through XII intact, superficial touch and pain sensation intact  Psychiatric: Alert, oriented x 2-3, calm and cooperative     Results/Data:   1/24/25: HgbA1C 7.8, Glu 107, BUN 18, Cr 2.0, Na+ 135, Kel 8.1, GFR 26, Chol 93, HDL 36, TSH 3.15, T4 10.8, Hgb 7.3, Hct 23.7, Plt 443  4/11/24: K+ 4.7, Phos 4.7  3/27/24: Glu 108, BUN 23 Cr 1.59, GFR 34, CO2 34, Kel 7.9, Hgb 7.8, Hct 26.3  3/18/24: BUN 38, Cr 2.41, GFR 21, Na+ 136, K+ 3.5, Kel 7.4, Hgb 7.3, Hct 23.8, Plt 175  3/14/24: Glu 24, Hgb 6.9  3/9/24: Glu 95, K+ 3.4, CO2 33, BUN 7, Cr 0.84, GFR 75, Kel 7.6, Alb 2.6, Hgb 7.9, Hct 27.0  3/7/24: HgbA1C 7.5  3/6/24: HgbA1C 7.7, Chol 147, , HDL 65, LDL 51    Assessment/Plan:  Acute on chronic hypoxia and hypercapnic respiratory failure/Influenza A/recurrent right pleural effusion-Influenza A and acute respiratory failure have resolved, s/p R thoracentesis with removal of 1 L of fluid (1/3/25), 2 gm Na+ diet, 1500 ml fluid restriction, c/w supplemental O2 (3 L at HS; 2-3 L during the day prn), Albuterol prn, monitor respiratory status closely   Delirium-RESOLVED, 2/2 hypercapnia, continue to monitor neuro status closely  Physical deconditioning/weakness-c/w PT/OT, safety and fall precautions   Clotted renal dialysis AV graft-s/p thrombectomy with axillary vein stenting on 1/13/25, tunneled venous central catheter placed on 1/15. vascular recommending resting the right arm x 2 weeks and used the tunneled dialysis catheter for treatments, F/U with vascular surgery in 1 week to assess AV graft  ESRD-c/w HD, monitor renal function, nephro following  Bradycardia-RESOLVED, monitor HR  AFib/CAD/HTN/HLD/HFpEF-s/p Watchman procedure (Aug. 2023), s/p RHC (2/27/24) which showed mild pulmonary hypertension, c/w amiodarone, aspirin, atorvastatin, and plavix, amlodipine stopped during hospitalization, metoprolol dosing changed during hospital stay due to bradycardia, but was  not reconciled on discharge medication list, BP and HR stable, will continue to hold for now, continue to monitor BP and HR, F/U with cardiologist as scheduled  Anemia-c/w iron monitor CBC  GERD-c/w PPI  Depression-c/w venlafaxine, supportive care  DM2-LCS diet, accuchecks, c/w lispro ISS with meals, lantus stopped due to hypoglycemia, c/w BP and lipid control, yearly eye exam, diabetic foot care, monitor HgbA1C  Hypothyroidism/left thyroid nodule-c/w levothyroxine, monitor TSH and FT4  Arthritis-c/w Tylenol 1000 mg BID, encourage ROM     Orders:  NNO    Code Status:   DNR-CCA/DNI    Time spent reviewing patient's chart on the unit (PMH, PSH, FH, Social Hx AND progress and/or consult notes, labs, and radiology results): 25 minutes  Time spent interviewing and/or examining the patient: 10 minutes  Time spent writing note on the unit: 5 minutes  Time spent reviewing POC w/ patient, family, and/or staff: 6 minutes  Total visit time: 46 minutes. Greater than 50% of time was spent on counseling and/or coordination of care of the patient. Start time: 1:10 PM, End time: 1:56 PM.      Electronically Signed By: GREGORIA Olivares   2/11/25 10:24 PM

## 2025-01-29 ENCOUNTER — APPOINTMENT (OUTPATIENT)
Dept: VASCULAR MEDICINE | Facility: CLINIC | Age: 70
End: 2025-01-29
Payer: MEDICARE

## 2025-02-01 ENCOUNTER — NURSING HOME VISIT (OUTPATIENT)
Dept: POST ACUTE CARE | Facility: EXTERNAL LOCATION | Age: 70
End: 2025-02-01
Payer: MEDICARE

## 2025-02-01 DIAGNOSIS — I10 HYPERTENSION, UNSPECIFIED TYPE: ICD-10-CM

## 2025-02-01 DIAGNOSIS — J44.9 COPD ON LONG-TERM INHALED STEROID THERAPY (MULTI): Primary | ICD-10-CM

## 2025-02-01 DIAGNOSIS — Z79.51 COPD ON LONG-TERM INHALED STEROID THERAPY (MULTI): Primary | ICD-10-CM

## 2025-02-01 DIAGNOSIS — N18.6 ESRD (END STAGE RENAL DISEASE) (MULTI): ICD-10-CM

## 2025-02-01 DIAGNOSIS — J90 PLEURAL EFFUSION ON RIGHT: ICD-10-CM

## 2025-02-01 DIAGNOSIS — I63.9 CEREBROVASCULAR ACCIDENT (CVA), UNSPECIFIED MECHANISM (MULTI): ICD-10-CM

## 2025-02-01 DIAGNOSIS — R53.1 WEAKNESS: ICD-10-CM

## 2025-02-01 DIAGNOSIS — F32.A DEPRESSION, UNSPECIFIED DEPRESSION TYPE: ICD-10-CM

## 2025-02-01 DIAGNOSIS — E78.5 HYPERLIPIDEMIA, UNSPECIFIED HYPERLIPIDEMIA TYPE: ICD-10-CM

## 2025-02-01 DIAGNOSIS — I50.9 CONGESTIVE HEART FAILURE, UNSPECIFIED HF CHRONICITY, UNSPECIFIED HEART FAILURE TYPE: ICD-10-CM

## 2025-02-01 DIAGNOSIS — K21.9 GASTROESOPHAGEAL REFLUX DISEASE WITHOUT ESOPHAGITIS: ICD-10-CM

## 2025-02-01 DIAGNOSIS — Z91.81 AT RISK FOR FALLING: ICD-10-CM

## 2025-02-01 DIAGNOSIS — I48.91 ATRIAL FIBRILLATION, UNSPECIFIED TYPE (MULTI): ICD-10-CM

## 2025-02-01 DIAGNOSIS — G93.40 ENCEPHALOPATHY, UNSPECIFIED TYPE: ICD-10-CM

## 2025-02-01 DIAGNOSIS — R09.02 HYPOXIA: ICD-10-CM

## 2025-02-01 DIAGNOSIS — J10.1 INFLUENZA A: ICD-10-CM

## 2025-02-01 PROCEDURE — 99309 SBSQ NF CARE MODERATE MDM 30: CPT | Performed by: INTERNAL MEDICINE

## 2025-02-01 NOTE — LETTER
Patient: Daphne Morris  : 1955    Encounter Date: 2025    PLACE OF SERVICE:  [facility]    This is a subsequent visit.    Subjective  Patient ID: Daphne Morris is a 69 y.o. female who presents for Follow-up.    Ms. Cecelia Morris is a 69-year-old female with history of CVA.  She has a history of COPD and is oxygen dependent.  She requires supportive care.    Review of Systems   Constitutional:  Negative for chills and fever.   Cardiovascular:  Negative for chest pain.   All other systems reviewed and are negative.    Objective  /78   Pulse 82   Temp 36.8 °C (98.2 °F)   Resp 18     Physical Exam  Vitals reviewed.   Constitutional:       General: She is not in acute distress.     Comments: This is a well-developed, well-nourished female, sitting in a chair.   HENT:      Right Ear: Tympanic membrane, ear canal and external ear normal.      Left Ear: Tympanic membrane, ear canal and external ear normal.   Eyes:      General: No scleral icterus.     Pupils: Pupils are equal, round, and reactive to light.   Neck:      Vascular: No carotid bruit.   Cardiovascular:      Heart sounds: Normal heart sounds, S1 normal and S2 normal. No murmur heard.     No friction rub.   Pulmonary:      Breath sounds: Decreased breath sounds (throughout) present.   Abdominal:      Palpations: There is no hepatomegaly, splenomegaly or mass.   Musculoskeletal:         General: No swelling or deformity. Normal range of motion.      Cervical back: Neck supple.      Right lower leg: No edema.      Left lower leg: No edema.   Lymphadenopathy:      Cervical: No cervical adenopathy.      Upper Body:      Right upper body: No axillary adenopathy.      Left upper body: No axillary adenopathy.      Lower Body: No right inguinal adenopathy. No left inguinal adenopathy.   Neurological:      Mental Status: She is oriented to person, place, and time.      Cranial Nerves: Cranial nerves 2-12  are intact. No cranial nerve deficit.      Sensory: No sensory deficit.      Motor: Motor function is intact. No weakness.      Gait: Gait is intact.      Deep Tendon Reflexes: Reflexes normal.   Psychiatric:         Mood and Affect: Mood normal. Mood is not anxious or depressed. Affect is not angry.         Behavior: Behavior is not agitated.         Thought Content: Thought content normal.         Judgment: Judgment normal.     LAB WORK:  Laboratory studies were reviewed.    Assessment/Plan  Problem List Items Addressed This Visit             ICD-10-CM       Advance Directives and General Issues    At risk for falling Z91.81       Cardiac and Vasculature    Hypertension I10    Hyperlipidemia, unspecified E78.5       Gastrointestinal and Abdominal    Gastroesophageal reflux disease K21.9       Genitourinary and Reproductive    ESRD (end stage renal disease) (Multi) N18.6       Neuro    Cerebrovascular accident (CVA) (Multi) I63.9     Other Visit Diagnoses         Codes    COPD on long-term inhaled steroid therapy (Multi)    -  Primary J44.9, Z79.51    Hypoxia     R09.02    Pleural effusion on right     J90    Influenza A     J10.1    Encephalopathy, unspecified type     G93.40    Depression, unspecified depression type     F32.A    Congestive heart failure, unspecified HF chronicity, unspecified heart failure type     I50.9    Atrial fibrillation, unspecified type (Multi)     I48.91    Weakness     R53.1        1. COPD, on bronchodilator therapy.  2. Hypoxia, on oxygen.  3. Pleural effusion, follow chest x-ray.  4. End-stage renal disease, on hemodialysis.  5. Recent influenza A, continue to monitor.  6. CVA, on supportive care.  7. Encephalopathy, on supportive care.  8. Depression, on medication.  9. GERD, on PPI.  10. Heart failure, on diuretics.  11. Hyperlipidemia, on statin.  12. Hypertension, med controlled.  13. Atrial fibrillation, rate controlled.  14. Weakness, on PT/OT.  15. Fall risk, on fall  precautions.    Ann Attestation  By signing my name below, I, Ann Santos attest that this documentation has been prepared under the direction and in the presence of Cale Lowe MD.     All medical record entries made by the scribe were personally dictated by me I have reviewed the chart and agree the record accurately reflects my personal performance of his history physical examination and management      Electronically Signed By: Cale Lowe MD   2/12/25 12:28 AM

## 2025-02-03 VITALS
RESPIRATION RATE: 18 BRPM | SYSTOLIC BLOOD PRESSURE: 130 MMHG | HEART RATE: 82 BPM | DIASTOLIC BLOOD PRESSURE: 78 MMHG | TEMPERATURE: 98.2 F

## 2025-02-03 ASSESSMENT — ENCOUNTER SYMPTOMS
FEVER: 0
CHILLS: 0

## 2025-02-03 NOTE — PROGRESS NOTES
PLACE OF SERVICE:  \[facility]\"" [facility] [place]    This is a subsequent visit.    Subjective   Patient ID: Daphne Morris is a 69 y.o. female who presents for Follow-up.    Ms. Cecelia Morris is a 69-year-old female with history of CVA.  She has a history of COPD and is oxygen dependent.  She requires supportive care.    Review of Systems   Constitutional:  Negative for chills and fever.   Cardiovascular:  Negative for chest pain.   All other systems reviewed and are negative.    Objective   /78   Pulse 82   Temp 36.8 °C (98.2 °F)   Resp 18     Physical Exam  Vitals reviewed.   Constitutional:       General: She is not in acute distress.     Comments: This is a well-developed, well-nourished female, sitting in a chair.   HENT:      Right Ear: Tympanic membrane, ear canal and external ear normal.      Left Ear: Tympanic membrane, ear canal and external ear normal.   Eyes:      General: No scleral icterus.     Pupils: Pupils are equal, round, and reactive to light.   Neck:      Vascular: No carotid bruit.   Cardiovascular:      Heart sounds: Normal heart sounds, S1 normal and S2 normal. No murmur heard.     No friction rub.   Pulmonary:      Breath sounds: Decreased breath sounds (throughout) present.   Abdominal:      Palpations: There is no hepatomegaly, splenomegaly or mass.   Musculoskeletal:         General: No swelling or deformity. Normal range of motion.      Cervical back: Neck supple.      Right lower leg: No edema.      Left lower leg: No edema.   Lymphadenopathy:      Cervical: No cervical adenopathy.      Upper Body:      Right upper body: No axillary adenopathy.      Left upper body: No axillary adenopathy.      Lower Body: No right inguinal adenopathy. No left inguinal adenopathy.   Neurological:      Mental Status: She is oriented to person, place, and time.      Cranial Nerves: Cranial nerves 2-12 are intact. No cranial nerve deficit.      Sensory: No sensory  deficit.      Motor: Motor function is intact. No weakness.      Gait: Gait is intact.      Deep Tendon Reflexes: Reflexes normal.   Psychiatric:         Mood and Affect: Mood normal. Mood is not anxious or depressed. Affect is not angry.         Behavior: Behavior is not agitated.         Thought Content: Thought content normal.         Judgment: Judgment normal.     LAB WORK:  Laboratory studies were reviewed.    Assessment/Plan   Problem List Items Addressed This Visit             ICD-10-CM       Advance Directives and General Issues    At risk for falling Z91.81       Cardiac and Vasculature    Hypertension I10    Hyperlipidemia, unspecified E78.5       Gastrointestinal and Abdominal    Gastroesophageal reflux disease K21.9       Genitourinary and Reproductive    ESRD (end stage renal disease) (Multi) N18.6       Neuro    Cerebrovascular accident (CVA) (Multi) I63.9     Other Visit Diagnoses         Codes    COPD on long-term inhaled steroid therapy (Multi)    -  Primary J44.9, Z79.51    Hypoxia     R09.02    Pleural effusion on right     J90    Influenza A     J10.1    Encephalopathy, unspecified type     G93.40    Depression, unspecified depression type     F32.A    Congestive heart failure, unspecified HF chronicity, unspecified heart failure type     I50.9    Atrial fibrillation, unspecified type (Multi)     I48.91    Weakness     R53.1        1. COPD, on bronchodilator therapy.  2. Hypoxia, on oxygen.  3. Pleural effusion, follow chest x-ray.  4. End-stage renal disease, on hemodialysis.  5. Recent influenza A, continue to monitor.  6. CVA, on supportive care.  7. Encephalopathy, on supportive care.  8. Depression, on medication.  9. GERD, on PPI.  10. Heart failure, on diuretics.  11. Hyperlipidemia, on statin.  12. Hypertension, med controlled.  13. Atrial fibrillation, rate controlled.  14. Weakness, on PT/OT.  15. Fall risk, on fall precautions.    Scribe Attestation  By signing my name below, IAnn  Ann Shrestha attest that this documentation has been prepared under the direction and in the presence of Cale Lowe MD.     All medical record entries made by the anandibe were personally dictated by me I have reviewed the chart and agree the record accurately reflects my personal performance of his history physical examination and management

## 2025-02-09 ENCOUNTER — NURSING HOME VISIT (OUTPATIENT)
Dept: POST ACUTE CARE | Facility: EXTERNAL LOCATION | Age: 70
End: 2025-02-09
Payer: MEDICARE

## 2025-02-09 DIAGNOSIS — I63.9 CEREBROVASCULAR ACCIDENT (CVA), UNSPECIFIED MECHANISM (MULTI): ICD-10-CM

## 2025-02-09 DIAGNOSIS — Z79.51 COPD ON LONG-TERM INHALED STEROID THERAPY (MULTI): ICD-10-CM

## 2025-02-09 DIAGNOSIS — J10.1 INFLUENZA A: ICD-10-CM

## 2025-02-09 DIAGNOSIS — J90 PLEURAL EFFUSION ON RIGHT: ICD-10-CM

## 2025-02-09 DIAGNOSIS — Z91.81 AT RISK FOR FALLING: ICD-10-CM

## 2025-02-09 DIAGNOSIS — G93.40 ENCEPHALOPATHY, UNSPECIFIED TYPE: ICD-10-CM

## 2025-02-09 DIAGNOSIS — I48.91 ATRIAL FIBRILLATION, UNSPECIFIED TYPE (MULTI): ICD-10-CM

## 2025-02-09 DIAGNOSIS — R09.02 HYPOXIA: ICD-10-CM

## 2025-02-09 DIAGNOSIS — I10 HYPERTENSION, UNSPECIFIED TYPE: ICD-10-CM

## 2025-02-09 DIAGNOSIS — N18.6 ESRD (END STAGE RENAL DISEASE) (MULTI): Primary | ICD-10-CM

## 2025-02-09 DIAGNOSIS — F32.A DEPRESSION, UNSPECIFIED DEPRESSION TYPE: ICD-10-CM

## 2025-02-09 DIAGNOSIS — I50.9 CONGESTIVE HEART FAILURE, UNSPECIFIED HF CHRONICITY, UNSPECIFIED HEART FAILURE TYPE: ICD-10-CM

## 2025-02-09 DIAGNOSIS — K21.9 GASTROESOPHAGEAL REFLUX DISEASE WITHOUT ESOPHAGITIS: ICD-10-CM

## 2025-02-09 DIAGNOSIS — R53.1 WEAKNESS: ICD-10-CM

## 2025-02-09 DIAGNOSIS — E78.5 HYPERLIPIDEMIA, UNSPECIFIED HYPERLIPIDEMIA TYPE: ICD-10-CM

## 2025-02-09 DIAGNOSIS — J44.9 COPD ON LONG-TERM INHALED STEROID THERAPY (MULTI): ICD-10-CM

## 2025-02-09 PROBLEM — I69.351 HEMIPLEGIA AND HEMIPARESIS FOLLOWING CEREBRAL INFARCTION AFFECTING RIGHT DOMINANT SIDE (MULTI): Status: ACTIVE | Noted: 2025-02-09

## 2025-02-09 PROBLEM — L97.921 NON-PRESSURE CHRONIC ULCER OF LEFT LOWER LEG, LIMITED TO BREAKDOWN OF SKIN: Status: ACTIVE | Noted: 2025-02-09

## 2025-02-09 PROBLEM — F33.1 MODERATE EPISODE OF RECURRENT MAJOR DEPRESSIVE DISORDER: Status: ACTIVE | Noted: 2025-02-09

## 2025-02-09 PROBLEM — C64.1 MALIGNANT NEOPLASM OF RIGHT KIDNEY (MULTI): Status: ACTIVE | Noted: 2025-02-09

## 2025-02-09 PROCEDURE — 99310 SBSQ NF CARE HIGH MDM 45: CPT | Performed by: INTERNAL MEDICINE

## 2025-02-09 NOTE — LETTER
Patient: Daphne Morris  : 1955    Encounter Date: 2025    PLACE OF SERVICE:  [facility].    This is a subsequent visit.    Subjective  Patient ID: Daphne Morris is a 69 y.o. female who presents for Follow-up.    Ms. Cecelia Morris is a 69-year-old female with history of COPD.  She is oxygen dependent.  She suffers from end-stage renal disease, on hemodialysis.  She is unable to care for herself and requires supportive care.    Review of Systems   Constitutional:  Negative for chills and fever.   Cardiovascular:  Negative for chest pain.   All other systems reviewed and are negative.    Objective  /84   Pulse 82   Temp 36.7 °C (98.1 °F)   Resp 18     Physical Exam  Vitals reviewed.   Constitutional:       General: She is not in acute distress.     Comments: This is a well-developed, well-nourished female, sitting in a chair.   HENT:      Right Ear: Tympanic membrane, ear canal and external ear normal.      Left Ear: Tympanic membrane, ear canal and external ear normal.   Eyes:      General: No scleral icterus.     Pupils: Pupils are equal, round, and reactive to light.   Neck:      Vascular: No carotid bruit.   Cardiovascular:      Heart sounds: Normal heart sounds, S1 normal and S2 normal. No murmur heard.     No friction rub.   Pulmonary:      Effort: Pulmonary effort is normal.      Breath sounds: Decreased breath sounds (throughout) present.   Abdominal:      Palpations: There is no hepatomegaly, splenomegaly or mass.   Musculoskeletal:         General: No swelling or deformity. Normal range of motion.      Cervical back: Neck supple.      Right lower leg: Edema present.      Left lower leg: Edema present.   Lymphadenopathy:      Cervical: No cervical adenopathy.      Upper Body:      Right upper body: No axillary adenopathy.      Left upper body: No axillary adenopathy.      Lower Body: No right inguinal adenopathy. No left inguinal adenopathy.    Neurological:      Mental Status: She is oriented to person, place, and time.      Cranial Nerves: Cranial nerves 2-12 are intact. No cranial nerve deficit.      Sensory: No sensory deficit.      Motor: Motor function is intact. No weakness.      Gait: Gait is intact.      Deep Tendon Reflexes: Reflexes normal.   Psychiatric:         Mood and Affect: Mood normal. Mood is not anxious or depressed. Affect is not angry.         Behavior: Behavior is not agitated.         Thought Content: Thought content normal.         Judgment: Judgment normal.     LAB WORK:  Laboratory studies were reviewed.    Assessment/Plan  Problem List Items Addressed This Visit             ICD-10-CM       Advance Directives and General Issues    At risk for falling Z91.81       Cardiac and Vasculature    Hypertension I10    Hyperlipidemia, unspecified E78.5       Gastrointestinal and Abdominal    Gastroesophageal reflux disease K21.9       Genitourinary and Reproductive    ESRD (end stage renal disease) (Multi) - Primary N18.6       Neuro    Cerebrovascular accident (CVA) (Multi) I63.9     Other Visit Diagnoses         Codes    COPD on long-term inhaled steroid therapy (Multi)     J44.9, Z79.51    Hypoxia     R09.02    Congestive heart failure, unspecified HF chronicity, unspecified heart failure type     I50.9    Pleural effusion on right     J90    Encephalopathy, unspecified type     G93.40    Influenza A     J10.1    Depression, unspecified depression type     F32.A    Atrial fibrillation, unspecified type (Multi)     I48.91    Weakness     R53.1        1. End-stage renal disease, on hemodialysis.  2. COPD, on bronchodilator therapy.  3. Hypoxia, on oxygen.  4. Heart failure, on diuretic.  5. Pleural effusion. Follow with chest x-ray.  6. Encephalopathy, on supportive care.  7. CVA, on supportive care.  8. Recent influenza infection. Continue to monitor.  9. Depression on medication.  10. Hypertension, medically controlled.  11.  Hyperlipidemia, on statin.  12. GERD, on PPI.  13. Atrial fibrillation, rate controlled.  14. Weakness, on PT/OT.  15. Fall risk, on fall precautions.    Scribe Attestation  By signing my name below, I, Ann Cummins attest that this documentation has been prepared under the direction and in the presence of Cale Lowe MD.     All medical record entries made by the scribe were personally dictated by me I have reviewed the chart and agree the record accurately reflects my personal performance of his history physical examination and management      Electronically Signed By: Cale Lowe MD   2/16/25 11:42 PM

## 2025-02-11 ENCOUNTER — NURSING HOME VISIT (OUTPATIENT)
Dept: POST ACUTE CARE | Facility: EXTERNAL LOCATION | Age: 70
End: 2025-02-11
Payer: MEDICARE

## 2025-02-11 VITALS
TEMPERATURE: 98.1 F | HEART RATE: 82 BPM | DIASTOLIC BLOOD PRESSURE: 84 MMHG | SYSTOLIC BLOOD PRESSURE: 136 MMHG | RESPIRATION RATE: 18 BRPM

## 2025-02-11 DIAGNOSIS — E88.09 HYPOALBUMINEMIA: Primary | ICD-10-CM

## 2025-02-11 DIAGNOSIS — R63.4 WEIGHT LOSS: ICD-10-CM

## 2025-02-11 DIAGNOSIS — N18.6 ESRD (END STAGE RENAL DISEASE) (MULTI): ICD-10-CM

## 2025-02-11 DIAGNOSIS — K21.9 GASTROESOPHAGEAL REFLUX DISEASE WITHOUT ESOPHAGITIS: ICD-10-CM

## 2025-02-11 DIAGNOSIS — E83.39 HYPOPHOSPHATEMIA: ICD-10-CM

## 2025-02-11 DIAGNOSIS — I48.91 ATRIAL FIBRILLATION, UNSPECIFIED TYPE (MULTI): ICD-10-CM

## 2025-02-11 DIAGNOSIS — R53.81 PHYSICAL DECONDITIONING: ICD-10-CM

## 2025-02-11 DIAGNOSIS — T82.590A MALFUNCTION OF ARTERIOVENOUS GRAFT, INITIAL ENCOUNTER: ICD-10-CM

## 2025-02-11 DIAGNOSIS — F32.A DEPRESSION, UNSPECIFIED DEPRESSION TYPE: ICD-10-CM

## 2025-02-11 DIAGNOSIS — E03.9 HYPOTHYROIDISM, UNSPECIFIED TYPE: ICD-10-CM

## 2025-02-11 PROCEDURE — 99309 SBSQ NF CARE MODERATE MDM 30: CPT | Performed by: NURSE PRACTITIONER

## 2025-02-11 ASSESSMENT — ENCOUNTER SYMPTOMS
FEVER: 0
CHILLS: 0

## 2025-02-11 NOTE — LETTER
Patient: Daphne Morris  : 1955    Encounter Date: 2025    Chief Complaint:   SNF F/U  -Acute on chronic respiratory failure 2/2 recurrent R pleural effusion and Influenza A  -Type II DM with hypoglycemia  -Delirium  -Physical deconditioning/weakness  -Fall  -Clotted renal dialysis AV graft     HPI:   69 year-old female presenting to [facility] ER on 24 s/p fall at home. Pt. did not know how long she was on the ground for, but per record review, there was concern that the time down was about 16 hours. Pt. was also noted to have some confusion. Work-up in ER: UA with > 50 WBC, 6-10 RBC, 1+ bacteria, Flu A positive, Na+ 129, BUn 55, Cr 4.12, Kel 7.5, AST 43, CK 1,137, lactate 1.0, mag 2.19, BNP 1,735, phos 8.8, Hgb 11.2, CT of head and C-spine negative for acute findings, XR of RFA negative for acute findings. She was placed on BiPAP with improvement noted in respiratory. She required D50 for correction of hypoglycemia. She was admitted to [facility] for further evaluation and treatment. Hospital course:    Acute on chronic hypercapnic respiratory failure/Flu A/right pleural effusion-pulmonology and palliative care consult, patient was refusing noninvasive ventilation, underwent R thoracentesis with removal of 1 L of fluid, hypercapnia did improve  Delirium-2/2 hypercapnia, improved at time of discharge  Hypoglycemia-accuchecks, lantus discontinued, c/w sliding scale  Physical deconditioning/weakness-PT/OT evaluations, recommending SNF     Pt. was HDS and discharged to [facility] on 25. Pt. was at dialysis on  and was sent to [facility] ER due to AV graft malfunction. Pt. was evaluated by vascular surgery and was admitted to [facility] and then to [facility] on 1/10. Upon arrival at [facility], pt. was lethargic and not responding to simple questions. CT of head was negative. ABG showed a pH of 7.24 with a pCO2 of 70. She was transferred to ICU. Hospital course:    Clotted renal dialysis  "AV graft-ASA/plavix held, vascular surgery consult, underwent US-guided vascular access into arterial and venous limb of RUE AV graft with administration of thrombocytic into the thrombosed graft on 1/13, tunneled venous central catheter placed into RIJ on 1/15, vascular recommending resting the right arm x 2 weeks and use the tunneled dialysis catheter R chest, OP F/U with vascular surgery in 1 week to assess the graft with the duplex  ESRD-nephrology consult, dialysis using R chest dialysis catheter, RFP monitored   Respiratory acidosis with acute on chronic hypercapnic respiratory failure-supplemental O2 (3 L chronically), BiPAP at HS   Metabolic encephalopathy-2/2 #3  Bradycardia-metoprolol succinate 50 mg BID changed to daily, HR monitored, hold parameters in place     Pt. was HDS and discharged back to [facility] on 1/21/25. Today, patient reports that she is feeling well, but tired. She reports appetite as \"fair\". She denies dizziness, HA, SOB, cough, or chest pain. She denies any pain. She feels that she is progressing well in therapy. Staff report no clinical concerns.     ROS:    As above in HPI. Otherwise, all other systems have been reviewed and are negative for complaint.    Medications reviewed and verified in NH chart.     Patient Active Problem List   Diagnosis   • Hypothyroidism   • Acute on chronic diastolic heart failure   • Altered mental status   • Anxiety   • Chronic fatigue   • Constipation by delayed colonic transit   • Type 2 diabetes mellitus   • Diabetic renal disease (Multi)   • Gastroesophageal reflux disease   • History of renal cell carcinoma   • Hypertension   • Peripheral vascular disease (CMS-HCC)   • Leukocytosis   • Lung nodule   • Major depressive disorder, single episode, unspecified   • Osteoarthritis   • Paroxysmal atrial fibrillation (Multi)   • Primary insomnia   • Stage 3b chronic kidney disease (CKD) (Multi)   • Trouble walking   • Hyperlipidemia, unspecified   • ESRD " on dialysis (Multi)   • ANDREA (acute kidney injury)   • ASHD (arteriosclerotic heart disease)   • At risk for falling   • Essential tremor   • Vitamin D deficiency   • Acute on chronic respiratory failure with hypoxia and hypercapnia (Multi)   • Pulmonary hypertension (Multi)   • Pulmonary edema   • Influenza B   • History of right nephrectomy   • Cognitive impairment   • Unspecified open wound, right lower leg, initial encounter   • Chronic kidney disease, stage V (Multi)   • Cellulitis of right lower extremity   • Open wound of right forearm   • Open wound of right upper arm   • Open wound of left lower leg   • Abrasion of buttock, infected   • Acute cystitis with hematuria   • Complication associated with dialysis catheter   • Cellulitis   • Iron deficiency anemia due to chronic blood loss   • Cerebrovascular accident (CVA) (Multi)   • CKD (chronic kidney disease) stage V requiring chronic dialysis (Multi)   • ESRD (end stage renal disease) (Multi)   • End stage renal disease (Multi)   • Influenza   • Recurrent pleural effusion   • Chronic respiratory failure   • Non-pressure chronic ulcer of left lower leg, limited to breakdown of skin   • Hemiplegia and hemiparesis following cerebral infarction affecting right dominant side (Multi)   • Moderate episode of recurrent major depressive disorder   • Malignant neoplasm of right kidney (Multi)        Past Medical History:   Diagnosis Date   • Anal fissure 10/12/2023   • Anemia    • ASHD (arteriosclerotic heart disease)    • At risk for falls    • Atrial fibrillation (Multi)    • AV graft malfunction    • CHF (congestive heart failure)    • CKD (chronic kidney disease)    • COPD (chronic obstructive pulmonary disease) (Multi)    • CVA (cerebral vascular accident) (Multi)     2021- right-sided weakness   • Depression    • Diabetes mellitus (Multi)    • Encephalopathy    • End-stage renal disease on hemodialysis (Multi)    • GERD (gastroesophageal reflux disease)    • H/O  pleural effusion    • Hyperlipidemia    • Hypertension    • Hypothyroidism    • Hypoxia    • Impacted cerumen, left ear     Impacted cerumen of left ear   • Influenza A    • Noncompliance    • Osteoarthritis    • Perianal dermatitis 10/12/2023   • Personal history of other diseases of the respiratory system     History of acute bronchitis   • Pleural effusion    • PVD (peripheral vascular disease) (CMS-HCC)    • Renal carcinoma, right (Multi)     s/p partial nephrectomy 2021   • Respiratory failure (Multi)    • Right sided weakness    • TIA (transient ischemic attack)    • Vasculitis (CMS-HCC) 10/12/2023   • Weakness        Past Surgical History:   Procedure Laterality Date   • ANKLE SURGERY         • CARDIAC CATHETERIZATION N/A 2024    Procedure: Right Heart Cath;  Surgeon: Nicholas Antonio MD;  Location: [facility] Cardiac Cath Lab;  Service: Cardiovascular;  Laterality: N/A;   • CATARACT EXTRACTION Right     2019   •  SECTION, CLASSIC     • MR CHEST ANGIO W AND WO IV CONTRAST  2023    MR CHEST ANGIO W AND WO IV CONTRAST 2023 [facility] MRI   • MR HEAD ANGIO WO IV CONTRAST  2021    MR HEAD ANGIO WO IV CONTRAST [facility]   • NEPHRECTOMY  2021   • SHOULDER SURGERY             Family History   Problem Relation Name Age of Onset   • Hypertension Mother     • Diabetes Father     • Heart disease Father     • Cancer Sister     • Cancer Brother     • Diabetes Daughter     • Asthma Daughter     • Heart attack Daughter     • Diabetes Maternal Grandfather     • Heart disease Paternal Grandmother     • Diabetes Other     • Hypertension Other     • COPD Other     • Other (chronic lung disease) Other         Social History     Tobacco Use   Smoking Status Every Day   • Current packs/day: 0.50   • Average packs/day: 0.5 packs/day for 56.2 years (28.1 ttl pk-yrs)   • Types: Cigarettes   • Start date: 1969   • Passive exposure: Never   Smokeless Tobacco Never       Social  History     Substance and Sexual Activity   Alcohol Use Not Currently       Social History     Substance and Sexual Activity   Drug Use Not Currently   • Types: Marijuana       Allergies   Allergen Reactions   • Bee Venom Protein (Honey Bee) Swelling   • Citalopram Hives, Other, Rash and Nausea/vomiting     Facial breakout, blisters, and rash   • Codeine Headache, Other and Itching     Migraines   • Influenza Virus Vaccines Hives   • Latex Other     blisters   • Latex, Natural Rubber Other     blisters   • Lisinopril Swelling and Nausea/vomiting     Facial swelling   • Penicillins Swelling, Headache and Unknown   • Adhesive Tape-Silicones Other     blisters   • Amoxicillin Swelling and Rash   • Doxycycline Rash and Unknown   • Oseltamivir Rash and Nausea/vomiting   • Phenyleph-Min Oil-Petrolatum Other   • Phenyleph-Shark Oil-Glyc-Pet Rash   • Phenylephrine Nausea/vomiting   • Prednisone Other and Nausea/vomiting     Yeast infection   • Tuberculin Ppd Unknown   • Xylometazoline Unknown        Vital Signs:   122/62-78-16-98.9-95% on RA     Physical Exam:  General: Sitting up in WC in NAD, alert   Head/Face: NCAT, symmetrical  Eyes: PERRLA, no injection, no discharge  ENT: Hearing not impaired, ears without scars or lesions, nasal mucosa and turbinates pink, septum midline, lips pink and moist  Neck: Supple, symmetrical  Respiratory: CTA but diminished without adventitious sounds, respirations even and nonlabored without use of accessory muscles, good air exchange  Cardio: Irregular rhythm, normal rate without murmur or gallops, normal S1S2, no edema, pedal pulses 2+/4 bilaterally  Chest/Breast: Symmetrical, tunneled dialysis cath to R chest   GI: BS x 4, normoactive, non-distended, abd round and soft, no masses or tenderness  : No suprapubic tenderness or distention  MSK: Gait not assessed, joints with full ROM without pain or contractures, + generalized weakness  Skin: Skin warm and dry, no induration, dry skin to  BLE, R chest dialysis port, RUE fistula  Neurologic: Cranial nerves II through XII intact, superficial touch and pain sensation intact  Psychiatric: Alert, oriented x 2-3, calm and cooperative     Results/Data:   2/7/25: Glu 130, BUN 39, Cr 3.10, K+ 5.6, GFR 16, Hgb 7.0, Hct 24.0  1/31/25: Hgb 7.4, Hct 24.6, Plt 408, BUN 19, Cr 1.8, Na+ 136, Kel 7.5, GFR 30  1/30/25: Alb 2.9, Phos 2.2, Hgb 7.5 (dialysis labs)  1/24/25: HgbA1C 7.8, Glu 107, BUN 18, Cr 2.0, Na+ 135, Kel 8.1, GFR 26, Chol 93, HDL 36, TSH 3.15, T4 10.8, Hgb 7.3, Hct 23.7, Plt 443  4/11/24: K+ 4.7, Phos 4.7  3/27/24: Glu 108, BUN 23 Cr 1.59, GFR 34, CO2 34, Kel 7.9, Hgb 7.8, Hct 26.3  3/18/24: BUN 38, Cr 2.41, GFR 21, Na+ 136, K+ 3.5, Kel 7.4, Hgb 7.3, Hct 23.8, Plt 175  3/14/24: Glu 24, Hgb 6.9  3/9/24: Glu 95, K+ 3.4, CO2 33, BUN 7, Cr 0.84, GFR 75, Kel 7.6, Alb 2.6, Hgb 7.9, Hct 27.0  3/7/24: HgbA1C 7.5  3/6/24: HgbA1C 7.7, Chol 147, , HDL 65, LDL 51    Assessment/Plan:  Acute on chronic hypoxia and hypercapnic respiratory failure/Influenza A/recurrent right pleural effusion-Influenza A and acute respiratory failure have resolved, s/p R thoracentesis with removal of 1 L of fluid (1/3/25), 2 gm Na+ diet, 1500 ml fluid restriction, c/w supplemental O2 (3 L at HS; 2-3 L during the day prn), Albuterol prn, monitor respiratory status closely   Delirium-RESOLVED, 2/2 hypercapnia, continue to monitor neuro status closely  Physical deconditioning/weakness-c/w PT/OT, safety and fall precautions   Clotted renal dialysis AV graft-s/p thrombectomy with axillary vein stenting on 1/13/25, tunneled venous central catheter placed on 1/15. vascular recommended resting the right arm x 2 weeks and using the tunneled dialysis catheter for treatments, F/U with vascular surgery as scheduled  ESRD-c/w HD, c/w renal vitamin, monitor renal function, nephro following  Bradycardia-RESOLVED, monitor HR  AFib/CAD/HTN/HLD/HFpEF-s/p Watchman procedure (Aug. 2023), s/p RHC  (2/27/24) which showed mild pulmonary hypertension, c/w amiodarone, aspirin, atorvastatin, and plavix, amlodipine stopped during hospitalization, metoprolol dosing changed during hospital stay due to bradycardia, but was not reconciled on discharge medication list, BP and HR stable, will continue to hold for now, continue to monitor BP and HR, F/U with cardiologist as scheduled  Anemia-c/w iron, monitor CBC  GERD-c/w PPI  Depression-c/w venlafaxine, supportive care  DM2-LCS diet, accuchecks, c/w lispro ISS with meals, lantus stopped due to hypoglycemia, c/w BP and lipid control, yearly eye exam, diabetic foot care, monitor HgbA1C  Hypothyroidism/left thyroid nodule-c/w levothyroxine, monitor TSH and FT4  Arthritis-c/w Tylenol 1000 mg BID, encourage ROM   Hypophosphatemia-monitor phosphorus level, RD following  Hypoalbuminemia/weight loss-encourage PO intake, increase protein intake, c/w renal vitamin, monitor weights, RD following     Orders:  NNO    Code Status:   DNR-CCA/DNI        Electronically Signed By: CARLENE Olivares-CNP   3/31/25  3:54 PM

## 2025-02-11 NOTE — PROGRESS NOTES
PLACE OF SERVICE:  [facility].    This is a subsequent visit.    Subjective   Patient ID: Daphne Morris is a 69 y.o. female who presents for Follow-up.    Ms. Cecelia Morris is a 69-year-old female with history of COPD.  She is oxygen dependent.  She suffers from end-stage renal disease, on hemodialysis.  She is unable to care for herself and requires supportive care.    Review of Systems   Constitutional:  Negative for chills and fever.   Cardiovascular:  Negative for chest pain.   All other systems reviewed and are negative.    Objective   /84   Pulse 82   Temp 36.7 °C (98.1 °F)   Resp 18     Physical Exam  Vitals reviewed.   Constitutional:       General: She is not in acute distress.     Comments: This is a well-developed, well-nourished female, sitting in a chair.   HENT:      Right Ear: Tympanic membrane, ear canal and external ear normal.      Left Ear: Tympanic membrane, ear canal and external ear normal.   Eyes:      General: No scleral icterus.     Pupils: Pupils are equal, round, and reactive to light.   Neck:      Vascular: No carotid bruit.   Cardiovascular:      Heart sounds: Normal heart sounds, S1 normal and S2 normal. No murmur heard.     No friction rub.   Pulmonary:      Effort: Pulmonary effort is normal.      Breath sounds: Decreased breath sounds (throughout) present.   Abdominal:      Palpations: There is no hepatomegaly, splenomegaly or mass.   Musculoskeletal:         General: No swelling or deformity. Normal range of motion.      Cervical back: Neck supple.      Right lower leg: Edema present.      Left lower leg: Edema present.   Lymphadenopathy:      Cervical: No cervical adenopathy.      Upper Body:      Right upper body: No axillary adenopathy.      Left upper body: No axillary adenopathy.      Lower Body: No right inguinal adenopathy. No left inguinal adenopathy.   Neurological:      Mental Status: She is oriented to person, place,  and time.      Cranial Nerves: Cranial nerves 2-12 are intact. No cranial nerve deficit.      Sensory: No sensory deficit.      Motor: Motor function is intact. No weakness.      Gait: Gait is intact.      Deep Tendon Reflexes: Reflexes normal.   Psychiatric:         Mood and Affect: Mood normal. Mood is not anxious or depressed. Affect is not angry.         Behavior: Behavior is not agitated.         Thought Content: Thought content normal.         Judgment: Judgment normal.     LAB WORK:  Laboratory studies were reviewed.    Assessment/Plan   Problem List Items Addressed This Visit             ICD-10-CM       Advance Directives and General Issues    At risk for falling Z91.81       Cardiac and Vasculature    Hypertension I10    Hyperlipidemia, unspecified E78.5       Gastrointestinal and Abdominal    Gastroesophageal reflux disease K21.9       Genitourinary and Reproductive    ESRD (end stage renal disease) (Multi) - Primary N18.6       Neuro    Cerebrovascular accident (CVA) (Multi) I63.9     Other Visit Diagnoses         Codes    COPD on long-term inhaled steroid therapy (Multi)     J44.9, Z79.51    Hypoxia     R09.02    Congestive heart failure, unspecified HF chronicity, unspecified heart failure type     I50.9    Pleural effusion on right     J90    Encephalopathy, unspecified type     G93.40    Influenza A     J10.1    Depression, unspecified depression type     F32.A    Atrial fibrillation, unspecified type (Multi)     I48.91    Weakness     R53.1        1. End-stage renal disease, on hemodialysis.  2. COPD, on bronchodilator therapy.  3. Hypoxia, on oxygen.  4. Heart failure, on diuretic.  5. Pleural effusion. Follow with chest x-ray.  6. Encephalopathy, on supportive care.  7. CVA, on supportive care.  8. Recent influenza infection. Continue to monitor.  9. Depression on medication.  10. Hypertension, medically controlled.  11. Hyperlipidemia, on statin.  12. GERD, on PPI.  13. Atrial fibrillation, rate  controlled.  14. Weakness, on PT/OT.  15. Fall risk, on fall precautions.    Scribe Attestation  By signing my name below, I, Ann Cummins attest that this documentation has been prepared under the direction and in the presence of Cale Lowe MD.     All medical record entries made by the scribe were personally dictated by me I have reviewed the chart and agree the record accurately reflects my personal performance of his history physical examination and management

## 2025-02-12 NOTE — PROGRESS NOTES
Chief Complaint:   SNF F/U  -Acute on chronic respiratory failure 2/2 recurrent R pleural effusion and Influenza A  -Type II DM with hypoglycemia  -Delirium  -Physical deconditioning/weakness  -Fall  -Clotted renal dialysis AV graft     HPI:   69 year-old female presenting to [facility] ER on 12/31/24 s/p fall at home. Pt. did not know how long she was on the ground for, but per record review, there was concern that the time down was about 16 hours. Pt. was also noted to have some confusion. Work-up in ER: UA with > 50 WBC, 6-10 RBC, 1+ bacteria, Flu A positive, Na+ 129, BUn 55, Cr 4.12, Kel 7.5, AST 43, CK 1,137, lactate 1.0, mag 2.19, BNP 1,735, phos 8.8, Hgb 11.2, CT of head and C-spine negative for acute findings, XR of RFA negative for acute findings. She was placed on BiPAP with improvement noted in respiratory. She required D50 for correction of hypoglycemia. She was admitted to [facility] for further evaluation and treatment. Hospital course:    Acute on chronic hypercapnic respiratory failure/Flu A/right pleural effusion-pulmonology and palliative care consult, patient was refusing noninvasive ventilation, underwent R thoracentesis with removal of 1 L of fluid, hypercapnia did improve  Delirium-2/2 hypercapnia, improved at time of discharge  Hypoglycemia-accuchecks, lantus discontinued, c/w sliding scale  Physical deconditioning/weakness-PT/OT evaluations, recommending SNF     Pt. was HDS and discharged to [facility] on 1/7/25. Pt. was at dialysis on 1/9 and was sent to [facility] ER due to AV graft malfunction. Pt. was evaluated by vascular surgery and was admitted to [facility] and then to [facility] on 1/10. Upon arrival at [facility], pt. was lethargic and not responding to simple questions. CT of head was negative. ABG showed a pH of 7.24 with a pCO2 of 70. She was transferred to ICU. Hospital course:    Clotted renal dialysis AV graft-ASA/plavix held, vascular surgery consult, underwent US-guided  "vascular access into arterial and venous limb of RUE AV graft with administration of thrombocytic into the thrombosed graft on 1/13, tunneled venous central catheter placed into RIJ on 1/15, vascular recommending resting the right arm x 2 weeks and use the tunneled dialysis catheter R chest, OP F/U with vascular surgery in 1 week to assess the graft with the duplex  ESRD-nephrology consult, dialysis using R chest dialysis catheter, RFP monitored   Respiratory acidosis with acute on chronic hypercapnic respiratory failure-supplemental O2 (3 L chronically), BiPAP at HS   Metabolic encephalopathy-2/2 #3  Bradycardia-metoprolol succinate 50 mg BID changed to daily, HR monitored, hold parameters in place     Pt. was HDS and discharged back to [facility] on 1/21/25. Today, patient reports that she is feeling well. She reports appetite as \"fair\". She denies dizziness, HA, SOB, cough, or chest pain. She denies any pain. Staff report no clinical concerns.     ROS:    As above in HPI. Otherwise, all other systems have been reviewed and are negative for complaint.    Medications reviewed and verified in NH chart.     Patient Active Problem List   Diagnosis    Hypothyroidism    Acute on chronic diastolic heart failure    Altered mental status    Anxiety    Chronic fatigue    Constipation by delayed colonic transit    Type 2 diabetes mellitus    Diabetic renal disease (Multi)    Gastroesophageal reflux disease    History of renal cell carcinoma    Hypertension    Peripheral vascular disease (CMS-[facility])    Leukocytosis    Lung nodule    Major depressive disorder, single episode, unspecified    Osteoarthritis    Paroxysmal atrial fibrillation (Multi)    Primary insomnia    Stage 3b chronic kidney disease (CKD) (Multi)    Trouble walking    Hyperlipidemia, unspecified    ESRD on dialysis (Multi)    ANDREA (acute kidney injury) (CMS-[facility])    ASHD (arteriosclerotic heart disease)    At risk for falling    Essential tremor    Vitamin D " deficiency    Acute on chronic respiratory failure with hypoxia and hypercapnia (Multi)    Pulmonary hypertension (Multi)    Pulmonary edema    Influenza B    History of right nephrectomy    Cognitive impairment    Unspecified open wound, right lower leg, initial encounter    Chronic kidney disease, stage V (Multi)    Cellulitis of right lower extremity    Open wound of right forearm    Open wound of right upper arm    Open wound of left lower leg    Abrasion of buttock, infected    Acute cystitis with hematuria    Complication associated with dialysis catheter    Cellulitis    Iron deficiency anemia due to chronic blood loss    Cerebrovascular accident (CVA) (Multi)    CKD (chronic kidney disease) stage V requiring chronic dialysis (Multi)    ESRD (end stage renal disease) (Multi)    End stage renal disease (Multi)    Influenza    Recurrent pleural effusion    Chronic respiratory failure    Non-pressure chronic ulcer of left lower leg, limited to breakdown of skin    Hemiplegia and hemiparesis following cerebral infarction affecting right dominant side (Multi)    Moderate episode of recurrent major depressive disorder    Malignant neoplasm of right kidney (Multi)        Past Medical History:   Diagnosis Date    Anal fissure 10/12/2023    Anemia     ASHD (arteriosclerotic heart disease)     At risk for falls     Atrial fibrillation (Multi)     AV graft malfunction (CMS-HCC)     CHF (congestive heart failure)     CKD (chronic kidney disease)     COPD (chronic obstructive pulmonary disease) (Multi)     CVA (cerebral vascular accident) (Multi)     2021- right-sided weakness    Depression     Diabetes mellitus (Multi)     Encephalopathy     End-stage renal disease on hemodialysis (Multi)     GERD (gastroesophageal reflux disease)     H/O pleural effusion     Hyperlipidemia     Hypertension     Hypothyroidism     Hypoxia     Impacted cerumen, left ear     Impacted cerumen of left ear    Influenza A     Noncompliance      Osteoarthritis     Perianal dermatitis 10/12/2023    Personal history of other diseases of the respiratory system     History of acute bronchitis    Pleural effusion     PVD (peripheral vascular disease) (CMS-HCC)     Renal carcinoma, right (Multi)     s/p partial nephrectomy 2021    Respiratory failure (Multi)     Right sided weakness     TIA (transient ischemic attack)     Vasculitis (CMS-HCC) 10/12/2023    Weakness        Past Surgical History:   Procedure Laterality Date    ANKLE SURGERY          CARDIAC CATHETERIZATION N/A 2024    Procedure: Right Heart Cath;  Surgeon: Nicholas Antonio MD;  Location: [facility] Cardiac Cath Lab;  Service: Cardiovascular;  Laterality: N/A;    CATARACT EXTRACTION Right     2019     SECTION, CLASSIC      MR CHEST ANGIO W AND WO IV CONTRAST  2023    MR CHEST ANGIO W AND WO IV CONTRAST 2023 [facility]    MR HEAD ANGIO WO IV CONTRAST  2021    MR HEAD ANGIO WO IV CONTRAST [facility]    NEPHRECTOMY  2021    SHOULDER SURGERY             Family History   Problem Relation Name Age of Onset    Hypertension Mother      Diabetes Father      Heart disease Father      Cancer Sister      Cancer Brother      Diabetes Daughter      Asthma Daughter      Heart attack Daughter      Diabetes Maternal Grandfather      Heart disease Paternal Grandmother      Diabetes Other      Hypertension Other      COPD Other      Other (chronic lung disease) Other         Social History     Tobacco Use   Smoking Status Every Day    Current packs/day: 0.50    Average packs/day: 0.5 packs/day for 56.1 years (28.1 ttl pk-yrs)    Types: Cigarettes    Start date: 1969    Passive exposure: Never   Smokeless Tobacco Never       Social History     Substance and Sexual Activity   Alcohol Use Not Currently       Social History     Substance and Sexual Activity   Drug Use Not Currently    Types: Marijuana       Allergies   Allergen Reactions    Bee Venom Protein  (Honey Bee) Swelling    Citalopram Hives, Other, Rash and Nausea/vomiting     Facial breakout, blisters, and rash    Codeine Headache, Other and Itching     Migraines    Influenza Virus Vaccines Hives    Latex Other     blisters    Latex, Natural Rubber Other     blisters    Lisinopril Swelling and Nausea/vomiting     Facial swelling    Penicillins Swelling, Headache and Unknown    Adhesive Tape-Silicones Other     blisters    Amoxicillin Swelling and Rash    Doxycycline Rash and Unknown    Oseltamivir Rash and Nausea/vomiting    Phenyleph-Min Oil-Petrolatum Other    Phenyleph-Shark Oil-Glyc-Pet Rash    Phenylephrine Nausea/vomiting    Prednisone Other and Nausea/vomiting     Yeast infection    Tuberculin Ppd Unknown    Xylometazoline Unknown        Vital Signs:   106/55-98-18-97.9-95% on RA     Physical Exam:  General: Sitting up in WC in NAD, alert   Head/Face: NCAT, symmetrical  Eyes: PERRLA, no injection, no discharge  ENT: Hearing not impaired, ears without scars or lesions, nasal mucosa and turbinates pink, septum midline, lips pink and moist  Neck: Supple, symmetrical  Respiratory: CTA but diminished without adventitious sounds, respirations even and nonlabored without use of accessory muscles, good air exchange  Cardio: Irregular rhythm, normal rate without murmur or gallops, normal S1S2, no edema, pedal pulses 2+/4 bilaterally  Chest/Breast: Symmetrical, tunneled dialysis cath to R chest   GI: BS x 4, normoactive, non-distended, abd round and soft, no masses or tenderness  : No suprapubic tenderness or distention  MSK: Gait not assessed, joints with full ROM without pain or contractures, + generalized weakness  Skin: Skin warm and dry, no induration, dry skin to BLE, R chest dialysis port, RUE fistula (non-functioning)   Neurologic: Cranial nerves II through XII intact, superficial touch and pain sensation intact  Psychiatric: Alert, oriented x 2-3, calm and cooperative     Results/Data:   1/24/25: HgbA1C  7.8, Glu 107, BUN 18, Cr 2.0, Na+ 135, Kel 8.1, GFR 26, Chol 93, HDL 36, TSH 3.15, T4 10.8, Hgb 7.3, Hct 23.7, Plt 443  4/11/24: K+ 4.7, Phos 4.7  3/27/24: Glu 108, BUN 23 Cr 1.59, GFR 34, CO2 34, Kel 7.9, Hgb 7.8, Hct 26.3  3/18/24: BUN 38, Cr 2.41, GFR 21, Na+ 136, K+ 3.5, Kel 7.4, Hgb 7.3, Hct 23.8, Plt 175  3/14/24: Glu 24, Hgb 6.9  3/9/24: Glu 95, K+ 3.4, CO2 33, BUN 7, Cr 0.84, GFR 75, Kel 7.6, Alb 2.6, Hgb 7.9, Hct 27.0  3/7/24: HgbA1C 7.5  3/6/24: HgbA1C 7.7, Chol 147, , HDL 65, LDL 51    Assessment/Plan:  Acute on chronic hypoxia and hypercapnic respiratory failure/Influenza A/recurrent right pleural effusion-Influenza A and acute respiratory failure have resolved, s/p R thoracentesis with removal of 1 L of fluid (1/3/25), 2 gm Na+ diet, 1500 ml fluid restriction, c/w supplemental O2 (3 L at HS; 2-3 L during the day prn), Albuterol prn, monitor respiratory status closely   Delirium-RESOLVED, 2/2 hypercapnia, continue to monitor neuro status closely  Physical deconditioning/weakness-c/w PT/OT, safety and fall precautions   Clotted renal dialysis AV graft-s/p thrombectomy with axillary vein stenting on 1/13/25, tunneled venous central catheter placed on 1/15. vascular recommending resting the right arm x 2 weeks and used the tunneled dialysis catheter for treatments, F/U with vascular surgery in 1 week to assess AV graft  ESRD-c/w HD, monitor renal function, nephro following  Bradycardia-RESOLVED, monitor HR  AFib/CAD/HTN/HLD/HFpEF-s/p Watchman procedure (Aug. 2023), s/p RHC (2/27/24) which showed mild pulmonary hypertension, c/w amiodarone, aspirin, atorvastatin, and plavix, amlodipine stopped during hospitalization, metoprolol dosing changed during hospital stay due to bradycardia, but was not reconciled on discharge medication list, BP and HR stable, will continue to hold for now, continue to monitor BP and HR, F/U with cardiologist as scheduled  Anemia-c/w iron monitor CBC  GERD-c/w  PPI  Depression-c/w venlafaxine, supportive care  DM2-LCS diet, accuchecks, c/w lispro ISS with meals, lantus stopped due to hypoglycemia, c/w BP and lipid control, yearly eye exam, diabetic foot care, monitor HgbA1C  Hypothyroidism/left thyroid nodule-c/w levothyroxine, monitor TSH and FT4  Arthritis-c/w Tylenol 1000 mg BID, encourage ROM     Orders:  NNO    Code Status:   DNR-CCA/DNI    Time spent reviewing patient's chart on the unit (PMH, PSH, FH, Social Hx AND progress and/or consult notes, labs, and radiology results): 25 minutes  Time spent interviewing and/or examining the patient: 10 minutes  Time spent writing note on the unit: 5 minutes  Time spent reviewing POC w/ patient, family, and/or staff: 6 minutes  Total visit time: 46 minutes. Greater than 50% of time was spent on counseling and/or coordination of care of the patient. Start time: 1:10 PM, End time: 1:56 PM.

## 2025-02-13 ENCOUNTER — DOCUMENTATION (OUTPATIENT)
Dept: TRANSPLANT | Facility: HOSPITAL | Age: 70
End: 2025-02-13
Payer: MEDICARE

## 2025-02-13 NOTE — PROGRESS NOTES
Patient was reviewed at Patient Review Meeting on 02/13/25 with Dr. Schneider, Dr. Ch, Dr. Jimenez, Dr. Denise, Dr. Leyva, Dr. Wilson, and Dr. Vega and LANDY Chavarria, APRIL Bailon, MANUEL Snowden, APRIL Clarke, MINERVA Powell, and ELMER Carvalho.  Per review, patient referral to be closed for the following reason: Not A Candidate*.  .    Referral has been closed and letter will be sent to patient.

## 2025-02-16 ENCOUNTER — NURSING HOME VISIT (OUTPATIENT)
Dept: POST ACUTE CARE | Facility: EXTERNAL LOCATION | Age: 70
End: 2025-02-16
Payer: MEDICARE

## 2025-02-16 DIAGNOSIS — I50.9 CONGESTIVE HEART FAILURE, UNSPECIFIED HF CHRONICITY, UNSPECIFIED HEART FAILURE TYPE: ICD-10-CM

## 2025-02-16 DIAGNOSIS — N18.6 ESRD (END STAGE RENAL DISEASE) (MULTI): ICD-10-CM

## 2025-02-16 DIAGNOSIS — Z79.51 COPD ON LONG-TERM INHALED STEROID THERAPY (MULTI): Primary | ICD-10-CM

## 2025-02-16 DIAGNOSIS — E78.5 HYPERLIPIDEMIA, UNSPECIFIED HYPERLIPIDEMIA TYPE: ICD-10-CM

## 2025-02-16 DIAGNOSIS — J44.9 COPD ON LONG-TERM INHALED STEROID THERAPY (MULTI): Primary | ICD-10-CM

## 2025-02-16 DIAGNOSIS — J96.90 RESPIRATORY FAILURE, UNSPECIFIED CHRONICITY, UNSPECIFIED WHETHER WITH HYPOXIA OR HYPERCAPNIA (MULTI): ICD-10-CM

## 2025-02-16 DIAGNOSIS — K21.9 GASTROESOPHAGEAL REFLUX DISEASE WITHOUT ESOPHAGITIS: ICD-10-CM

## 2025-02-16 DIAGNOSIS — G93.40 ENCEPHALOPATHY, UNSPECIFIED TYPE: ICD-10-CM

## 2025-02-16 DIAGNOSIS — R53.1 WEAKNESS: ICD-10-CM

## 2025-02-16 DIAGNOSIS — I10 HYPERTENSION, UNSPECIFIED TYPE: ICD-10-CM

## 2025-02-16 DIAGNOSIS — Z91.81 AT RISK FOR FALLING: ICD-10-CM

## 2025-02-16 DIAGNOSIS — F32.A DEPRESSION, UNSPECIFIED DEPRESSION TYPE: ICD-10-CM

## 2025-02-16 DIAGNOSIS — I48.91 ATRIAL FIBRILLATION, UNSPECIFIED TYPE (MULTI): ICD-10-CM

## 2025-02-16 DIAGNOSIS — J90 PLEURAL EFFUSION ON RIGHT: ICD-10-CM

## 2025-02-16 DIAGNOSIS — I63.9 CEREBROVASCULAR ACCIDENT (CVA), UNSPECIFIED MECHANISM (MULTI): ICD-10-CM

## 2025-02-16 PROCEDURE — 99309 SBSQ NF CARE MODERATE MDM 30: CPT | Performed by: INTERNAL MEDICINE

## 2025-02-16 NOTE — LETTER
Patient: Dahpne Morris  : 1955    Encounter Date: 2025    PLACE OF SERVICE:  [facility].    This is a subsequent visit.    Subjective  Patient ID: Daphne Morris is a 69 y.o. female who presents for Follow-up.    Ms. Cecelia Morris is a 69-year-old female with history of heart failure with COPD.  She suffers from hypoxia and is oxygen dependent.  She requires supportive care.    Review of Systems   Constitutional:  Negative for chills and fever.   Cardiovascular:  Negative for chest pain.   All other systems reviewed and are negative.    Objective  /86   Pulse 82   Temp 36.7 °C (98.1 °F)   Resp 18     Physical Exam  Vitals reviewed.   Constitutional:       General: She is not in acute distress.     Comments: This is a well-developed, well-nourished female, sitting in a chair.   HENT:      Right Ear: Tympanic membrane, ear canal and external ear normal.      Left Ear: Tympanic membrane, ear canal and external ear normal.   Eyes:      General: No scleral icterus.     Pupils: Pupils are equal, round, and reactive to light.   Neck:      Vascular: No carotid bruit.   Cardiovascular:      Heart sounds: Normal heart sounds, S1 normal and S2 normal. No murmur heard.     No friction rub.   Pulmonary:      Effort: Pulmonary effort is normal.      Breath sounds: Decreased breath sounds (throughout) present.   Abdominal:      Palpations: There is no hepatomegaly, splenomegaly or mass.   Musculoskeletal:         General: No swelling or deformity. Normal range of motion.      Cervical back: Neck supple.      Right lower leg: Edema (trace) present.      Left lower leg: Edema (trace) present.   Lymphadenopathy:      Cervical: No cervical adenopathy.      Upper Body:      Right upper body: No axillary adenopathy.      Left upper body: No axillary adenopathy.      Lower Body: No right inguinal adenopathy. No left inguinal adenopathy.   Neurological:      Mental  Status: She is oriented to person, place, and time.      Cranial Nerves: Cranial nerves 2-12 are intact. No cranial nerve deficit.      Sensory: No sensory deficit.      Motor: Motor function is intact. No weakness.      Gait: Gait is intact.      Deep Tendon Reflexes: Reflexes normal.   Psychiatric:         Mood and Affect: Mood normal. Mood is not anxious or depressed. Affect is not angry.         Behavior: Behavior is not agitated.         Thought Content: Thought content normal.         Judgment: Judgment normal.     LAB WORK: Laboratory studies reviewed.    Assessment/Plan  Problem List Items Addressed This Visit             ICD-10-CM       Advance Directives and General Issues    At risk for falling Z91.81       Cardiac and Vasculature    Hypertension I10    Hyperlipidemia, unspecified E78.5       Gastrointestinal and Abdominal    Gastroesophageal reflux disease K21.9       Genitourinary and Reproductive    ESRD (end stage renal disease) (Multi) N18.6       Neuro    Cerebrovascular accident (CVA) (Multi) I63.9     Other Visit Diagnoses         Codes    COPD on long-term inhaled steroid therapy (Multi)    -  Primary J44.9, Z79.51    Congestive heart failure, unspecified HF chronicity, unspecified heart failure type     I50.9    Respiratory failure, unspecified chronicity, unspecified whether with hypoxia or hypercapnia (Multi)     J96.90    Encephalopathy, unspecified type     G93.40    Pleural effusion on right     J90    Depression, unspecified depression type     F32.A    Atrial fibrillation, unspecified type (Multi)     I48.91    Weakness     R53.1        1. COPD, on bronchodilator therapy.  2. Heart failure, on diuretic.  3. Respiratory failure, on oxygen.  4. End-stage renal disease, on hemodialysis.  5. CVA, on supportive care.  6. Encephalopathy, on supportive care.  7. Pleural effusion, follow with chest x-ray.  8. Hypertension, med controlled.  9. Hyperlipidemia, on statin.  10. Depression, on  medication.  11. Atrial fibrillation, rate controlled.  12. GERD, on PPI.  13. Weakness, on PT/OT.  14. Fall risk, fall precautions.    Scribe Attestation  By signing my name below, I, Ann Cmumins attest that this documentation has been prepared under the direction and in the presence of Cale Lowe MD.     All medical record entries made by the scribe were personally dictated by me I have reviewed the chart and agree the record accurately reflects my personal performance of his history physical examination and management      Electronically Signed By: Cale Lowe MD   2/23/25  1:51 AM

## 2025-02-19 VITALS
TEMPERATURE: 98.1 F | DIASTOLIC BLOOD PRESSURE: 86 MMHG | HEART RATE: 82 BPM | RESPIRATION RATE: 18 BRPM | SYSTOLIC BLOOD PRESSURE: 136 MMHG

## 2025-02-19 ASSESSMENT — ENCOUNTER SYMPTOMS
CHILLS: 0
FEVER: 0

## 2025-02-19 NOTE — PROGRESS NOTES
PLACE OF SERVICE:  [facility] [place].    This is a subsequent visit.    Subjective   Patient ID: Daphne Morris is a 69 y.o. female who presents for Follow-up.    Ms. Cecelia Morris is a 69-year-old female with history of heart failure with COPD.  She suffers from hypoxia and is oxygen dependent.  She requires supportive care.    Review of Systems   Constitutional:  Negative for chills and fever.   Cardiovascular:  Negative for chest pain.   All other systems reviewed and are negative.    Objective   /86   Pulse 82   Temp 36.7 °C (98.1 °F)   Resp 18     Physical Exam  Vitals reviewed.   Constitutional:       General: She is not in acute distress.     Comments: This is a well-developed, well-nourished female, sitting in a chair.   HENT:      Right Ear: Tympanic membrane, ear canal and external ear normal.      Left Ear: Tympanic membrane, ear canal and external ear normal.   Eyes:      General: No scleral icterus.     Pupils: Pupils are equal, round, and reactive to light.   Neck:      Vascular: No carotid bruit.   Cardiovascular:      Heart sounds: Normal heart sounds, S1 normal and S2 normal. No murmur heard.     No friction rub.   Pulmonary:      Effort: Pulmonary effort is normal.      Breath sounds: Decreased breath sounds (throughout) present.   Abdominal:      Palpations: There is no hepatomegaly, splenomegaly or mass.   Musculoskeletal:         General: No swelling or deformity. Normal range of motion.      Cervical back: Neck supple.      Right lower leg: Edema (trace) present.      Left lower leg: Edema (trace) present.   Lymphadenopathy:      Cervical: No cervical adenopathy.      Upper Body:      Right upper body: No axillary adenopathy.      Left upper body: No axillary adenopathy.      Lower Body: No right inguinal adenopathy. No left inguinal adenopathy.   Neurological:      Mental Status: She is oriented to person, place, and time.      Cranial Nerves:  Cranial nerves 2-12 are intact. No cranial nerve deficit.      Sensory: No sensory deficit.      Motor: Motor function is intact. No weakness.      Gait: Gait is intact.      Deep Tendon Reflexes: Reflexes normal.   Psychiatric:         Mood and Affect: Mood normal. Mood is not anxious or depressed. Affect is not angry.         Behavior: Behavior is not agitated.         Thought Content: Thought content normal.         Judgment: Judgment normal.     LAB WORK: Laboratory studies reviewed.    Assessment/Plan   Problem List Items Addressed This Visit             ICD-10-CM       Advance Directives and General Issues    At risk for falling Z91.81       Cardiac and Vasculature    Hypertension I10    Hyperlipidemia, unspecified E78.5       Gastrointestinal and Abdominal    Gastroesophageal reflux disease K21.9       Genitourinary and Reproductive    ESRD (end stage renal disease) (Multi) N18.6       Neuro    Cerebrovascular accident (CVA) (Multi) I63.9     Other Visit Diagnoses         Codes    COPD on long-term inhaled steroid therapy (Multi)    -  Primary J44.9, Z79.51    Congestive heart failure, unspecified HF chronicity, unspecified heart failure type     I50.9    Respiratory failure, unspecified chronicity, unspecified whether with hypoxia or hypercapnia (Multi)     J96.90    Encephalopathy, unspecified type     G93.40    Pleural effusion on right     J90    Depression, unspecified depression type     F32.A    Atrial fibrillation, unspecified type (Multi)     I48.91    Weakness     R53.1        1. COPD, on bronchodilator therapy.  2. Heart failure, on diuretic.  3. Respiratory failure, on oxygen.  4. End-stage renal disease, on hemodialysis.  5. CVA, on supportive care.  6. Encephalopathy, on supportive care.  7. Pleural effusion, follow with chest x-ray.  8. Hypertension, med controlled.  9. Hyperlipidemia, on statin.  10. Depression, on medication.  11. Atrial fibrillation, rate controlled.  12. GERD, on PPI.  13.  Weakness, on PT/OT.  14. Fall risk, fall precautions.    Scribe Attestation  By signing my name below, I, Ann Cummins attest that this documentation has been prepared under the direction and in the presence of Cale Lowe MD.     All medical record entries made by the scribe were personally dictated by me I have reviewed the chart and agree the record accurately reflects my personal performance of his history physical examination and management

## 2025-03-06 ENCOUNTER — NURSING HOME VISIT (OUTPATIENT)
Dept: POST ACUTE CARE | Facility: EXTERNAL LOCATION | Age: 70
End: 2025-03-06
Payer: MEDICARE

## 2025-03-06 DIAGNOSIS — I10 HYPERTENSION, UNSPECIFIED TYPE: ICD-10-CM

## 2025-03-06 DIAGNOSIS — J96.90 RESPIRATORY FAILURE, UNSPECIFIED CHRONICITY, UNSPECIFIED WHETHER WITH HYPOXIA OR HYPERCAPNIA (MULTI): ICD-10-CM

## 2025-03-06 DIAGNOSIS — G93.40 ENCEPHALOPATHY, UNSPECIFIED TYPE: ICD-10-CM

## 2025-03-06 DIAGNOSIS — I48.91 ATRIAL FIBRILLATION, UNSPECIFIED TYPE (MULTI): ICD-10-CM

## 2025-03-06 DIAGNOSIS — N18.6 ESRD (END STAGE RENAL DISEASE) (MULTI): Primary | ICD-10-CM

## 2025-03-06 DIAGNOSIS — I50.9 CONGESTIVE HEART FAILURE, UNSPECIFIED HF CHRONICITY, UNSPECIFIED HEART FAILURE TYPE: ICD-10-CM

## 2025-03-06 DIAGNOSIS — K21.9 GASTROESOPHAGEAL REFLUX DISEASE WITHOUT ESOPHAGITIS: ICD-10-CM

## 2025-03-06 DIAGNOSIS — J44.9 COPD ON LONG-TERM INHALED STEROID THERAPY (MULTI): ICD-10-CM

## 2025-03-06 DIAGNOSIS — Z91.81 AT RISK FOR FALLING: ICD-10-CM

## 2025-03-06 DIAGNOSIS — F32.A DEPRESSION, UNSPECIFIED DEPRESSION TYPE: ICD-10-CM

## 2025-03-06 DIAGNOSIS — R53.1 WEAKNESS: ICD-10-CM

## 2025-03-06 DIAGNOSIS — E78.5 HYPERLIPIDEMIA, UNSPECIFIED HYPERLIPIDEMIA TYPE: ICD-10-CM

## 2025-03-06 DIAGNOSIS — I63.9 CEREBROVASCULAR ACCIDENT (CVA), UNSPECIFIED MECHANISM (MULTI): ICD-10-CM

## 2025-03-06 DIAGNOSIS — Z79.51 COPD ON LONG-TERM INHALED STEROID THERAPY (MULTI): ICD-10-CM

## 2025-03-06 DIAGNOSIS — J90 PLEURAL EFFUSION ON RIGHT: ICD-10-CM

## 2025-03-06 PROCEDURE — 99309 SBSQ NF CARE MODERATE MDM 30: CPT | Performed by: INTERNAL MEDICINE

## 2025-03-06 NOTE — LETTER
Patient: Daphne Morris  : 1955    Encounter Date: 2025    PLACE OF SERVICE: [facility].    This is a subsequent visit.    Subjective  Patient ID: Daphne Morris is a 69 y.o. female who presents for Follow-up.    Ms. Daphne Morris is a 69-year-old female with history of end-stage renal disease, on hemodialysis.  She suffers from COPD and heart failure.  She is oxygen dependent and requires supportive care.    Review of Systems   Constitutional:  Negative for chills and fever.   Cardiovascular:  Negative for chest pain.   All other systems reviewed and are negative.    Objective  /84   Pulse 84   Temp 36.7 °C (98.1 °F)   Resp 18     Physical Exam  Vitals reviewed.   Constitutional:       Comments: This is a well-developed, well-nourished female, lying in bed, appearing weak.   HENT:      Right Ear: Tympanic membrane, ear canal and external ear normal.      Left Ear: Tympanic membrane, ear canal and external ear normal.   Eyes:      General: No scleral icterus.     Pupils: Pupils are equal, round, and reactive to light.   Neck:      Vascular: No carotid bruit.   Cardiovascular:      Heart sounds: Normal heart sounds, S1 normal and S2 normal. No murmur heard.     No friction rub.   Pulmonary:      Effort: Pulmonary effort is normal.      Breath sounds: Decreased breath sounds (throughout) present.   Abdominal:      Palpations: There is no hepatomegaly, splenomegaly or mass.   Musculoskeletal:         General: No swelling or deformity. Normal range of motion.      Cervical back: Neck supple.      Right lower leg: Edema present.      Left lower leg: Edema present.   Lymphadenopathy:      Cervical: No cervical adenopathy.      Upper Body:      Right upper body: No axillary adenopathy.      Left upper body: No axillary adenopathy.      Lower Body: No right inguinal adenopathy. No left inguinal adenopathy.   Neurological:      Mental Status: She is oriented  to person, place, and time. She is lethargic.      Cranial Nerves: Cranial nerves 2-12 are intact. No cranial nerve deficit.      Sensory: No sensory deficit.      Motor: Motor function is intact. No weakness.      Gait: Gait is intact.      Deep Tendon Reflexes: Reflexes normal.   Psychiatric:         Mood and Affect: Mood normal. Mood is not anxious or depressed. Affect is not angry.         Behavior: Behavior is not agitated.         Thought Content: Thought content normal.         Judgment: Judgment normal.     LAB WORK: Laboratory studies were reviewed.    Assessment/Plan  Problem List Items Addressed This Visit             ICD-10-CM       Advance Directives and General Issues    At risk for falling Z91.81       Cardiac and Vasculature    Hypertension I10    Hyperlipidemia, unspecified E78.5       Gastrointestinal and Abdominal    Gastroesophageal reflux disease K21.9       Genitourinary and Reproductive    ESRD (end stage renal disease) (Multi) - Primary N18.6       Neuro    Cerebrovascular accident (CVA) (Multi) I63.9     Other Visit Diagnoses         Codes    COPD on long-term inhaled steroid therapy (Multi)     J44.9, Z79.51    Respiratory failure, unspecified chronicity, unspecified whether with hypoxia or hypercapnia (Multi)     J96.90    Congestive heart failure, unspecified HF chronicity, unspecified heart failure type     I50.9    Atrial fibrillation, unspecified type (Multi)     I48.91    Pleural effusion on right     J90    Encephalopathy, unspecified type     G93.40    Depression, unspecified depression type     F32.A    Weakness     R53.1        1. End-stage renal disease, on hemodialysis.  2. COPD, on bronchodilator therapy.  3. Respiratory failure, on oxygen.  4. Heart failure, on diuretic.  5. Atrial fibrillation, rate controlled.  6. CVA, on supportive care.  7. Pleural effusion, follow chest x-ray.  8. Encephalopathy, on supportive care.  9. Hypertension, med controlled  10. Hyperlipidemia, on  statin.  11. GERD, on PPI.  12. Depression, on medication.  13. Weakness, on PT/OT.  14. Fall risk, on fall precautions.    Scribe Attestation  By signing my name below, I, Ann Cummins attest that this documentation has been prepared under the direction and in the presence of Cael Lowe MD.     All medical record entries made by the scribe were personally dictated by me I have reviewed the chart and agree the record accurately reflects my personal performance of his history physical examination and management      Electronically Signed By: Cale Lowe MD   3/9/25  9:33 PM

## 2025-03-07 VITALS
TEMPERATURE: 98.1 F | RESPIRATION RATE: 18 BRPM | DIASTOLIC BLOOD PRESSURE: 84 MMHG | HEART RATE: 84 BPM | SYSTOLIC BLOOD PRESSURE: 136 MMHG

## 2025-03-07 ASSESSMENT — ENCOUNTER SYMPTOMS
FEVER: 0
CHILLS: 0

## 2025-03-07 NOTE — PROGRESS NOTES
PLACE OF SERVICE: [facility].    This is a subsequent visit.    Subjective   Patient ID: Daphne Morris is a 69 y.o. female who presents for Follow-up.    Ms. Daphne Morris is a 69-year-old female with history of end-stage renal disease, on hemodialysis.  She suffers from COPD and heart failure.  She is oxygen dependent and requires supportive care.    Review of Systems   Constitutional:  Negative for chills and fever.   Cardiovascular:  Negative for chest pain.   All other systems reviewed and are negative.    Objective   /84   Pulse 84   Temp 36.7 °C (98.1 °F)   Resp 18     Physical Exam  Vitals reviewed.   Constitutional:       Comments: This is a well-developed, well-nourished female, lying in bed, appearing weak.   HENT:      Right Ear: Tympanic membrane, ear canal and external ear normal.      Left Ear: Tympanic membrane, ear canal and external ear normal.   Eyes:      General: No scleral icterus.     Pupils: Pupils are equal, round, and reactive to light.   Neck:      Vascular: No carotid bruit.   Cardiovascular:      Heart sounds: Normal heart sounds, S1 normal and S2 normal. No murmur heard.     No friction rub.   Pulmonary:      Effort: Pulmonary effort is normal.      Breath sounds: Decreased breath sounds (throughout) present.   Abdominal:      Palpations: There is no hepatomegaly, splenomegaly or mass.   Musculoskeletal:         General: No swelling or deformity. Normal range of motion.      Cervical back: Neck supple.      Right lower leg: Edema present.      Left lower leg: Edema present.   Lymphadenopathy:      Cervical: No cervical adenopathy.      Upper Body:      Right upper body: No axillary adenopathy.      Left upper body: No axillary adenopathy.      Lower Body: No right inguinal adenopathy. No left inguinal adenopathy.   Neurological:      Mental Status: She is oriented to person, place, and time. She is lethargic.      Cranial Nerves:  Cranial nerves 2-12 are intact. No cranial nerve deficit.      Sensory: No sensory deficit.      Motor: Motor function is intact. No weakness.      Gait: Gait is intact.      Deep Tendon Reflexes: Reflexes normal.   Psychiatric:         Mood and Affect: Mood normal. Mood is not anxious or depressed. Affect is not angry.         Behavior: Behavior is not agitated.         Thought Content: Thought content normal.         Judgment: Judgment normal.     LAB WORK: Laboratory studies were reviewed.    Assessment/Plan   Problem List Items Addressed This Visit             ICD-10-CM       Advance Directives and General Issues    At risk for falling Z91.81       Cardiac and Vasculature    Hypertension I10    Hyperlipidemia, unspecified E78.5       Gastrointestinal and Abdominal    Gastroesophageal reflux disease K21.9       Genitourinary and Reproductive    ESRD (end stage renal disease) (Multi) - Primary N18.6       Neuro    Cerebrovascular accident (CVA) (Multi) I63.9     Other Visit Diagnoses         Codes    COPD on long-term inhaled steroid therapy (Multi)     J44.9, Z79.51    Respiratory failure, unspecified chronicity, unspecified whether with hypoxia or hypercapnia (Multi)     J96.90    Congestive heart failure, unspecified HF chronicity, unspecified heart failure type     I50.9    Atrial fibrillation, unspecified type (Multi)     I48.91    Pleural effusion on right     J90    Encephalopathy, unspecified type     G93.40    Depression, unspecified depression type     F32.A    Weakness     R53.1        1. End-stage renal disease, on hemodialysis.  2. COPD, on bronchodilator therapy.  3. Respiratory failure, on oxygen.  4. Heart failure, on diuretic.  5. Atrial fibrillation, rate controlled.  6. CVA, on supportive care.  7. Pleural effusion, follow chest x-ray.  8. Encephalopathy, on supportive care.  9. Hypertension, med controlled  10. Hyperlipidemia, on statin.  11. GERD, on PPI.  12. Depression, on medication.  13.  Weakness, on PT/OT.  14. Fall risk, on fall precautions.    Scribe Attestation  By signing my name below, I, Ann uCmmins attest that this documentation has been prepared under the direction and in the presence of Cale Lowe MD.     All medical record entries made by the scribe were personally dictated by me I have reviewed the chart and agree the record accurately reflects my personal performance of his history physical examination and management

## 2025-03-31 NOTE — PROGRESS NOTES
Chief Complaint:   SNF F/U  -Acute on chronic respiratory failure 2/2 recurrent R pleural effusion and Influenza A  -Type II DM with hypoglycemia  -Delirium  -Physical deconditioning/weakness  -Fall  -Clotted renal dialysis AV graft     HPI:   69 year-old female presenting to [facility] ER on 12/31/24 s/p fall at home. Pt. did not know how long she was on the ground for, but per record review, there was concern that the time down was about 16 hours. Pt. was also noted to have some confusion. Work-up in ER: UA with > 50 WBC, 6-10 RBC, 1+ bacteria, Flu A positive, Na+ 129, BUn 55, Cr 4.12, Kel 7.5, AST 43, CK 1,137, lactate 1.0, mag 2.19, BNP 1,735, phos 8.8, Hgb 11.2, CT of head and C-spine negative for acute findings, XR of RFA negative for acute findings. She was placed on BiPAP with improvement noted in respiratory. She required D50 for correction of hypoglycemia. She was admitted to [facility] for further evaluation and treatment. Hospital course:    Acute on chronic hypercapnic respiratory failure/Flu A/right pleural effusion-pulmonology and palliative care consult, patient was refusing noninvasive ventilation, underwent R thoracentesis with removal of 1 L of fluid, hypercapnia did improve  Delirium-2/2 hypercapnia, improved at time of discharge  Hypoglycemia-accuchecks, lantus discontinued, c/w sliding scale  Physical deconditioning/weakness-PT/OT evaluations, recommending SNF     Pt. was HDS and discharged to [facility] on 1/7/25. Pt. was at dialysis on 1/9 and was sent to [facility] ER due to AV graft malfunction. Pt. was evaluated by vascular surgery and was admitted to [facility] and then to [facility] on 1/10. Upon arrival at [facility], pt. was lethargic and not responding to simple questions. CT of head was negative. ABG showed a pH of 7.24 with a pCO2 of 70. She was transferred to ICU. Hospital course:    Clotted renal dialysis AV graft-ASA/plavix held, vascular surgery consult, underwent US-guided  "vascular access into arterial and venous limb of RUE AV graft with administration of thrombocytic into the thrombosed graft on 1/13, tunneled venous central catheter placed into RIJ on 1/15, vascular recommending resting the right arm x 2 weeks and use the tunneled dialysis catheter R chest, OP F/U with vascular surgery in 1 week to assess the graft with the duplex  ESRD-nephrology consult, dialysis using R chest dialysis catheter, RFP monitored   Respiratory acidosis with acute on chronic hypercapnic respiratory failure-supplemental O2 (3 L chronically), BiPAP at HS   Metabolic encephalopathy-2/2 #3  Bradycardia-metoprolol succinate 50 mg BID changed to daily, HR monitored, hold parameters in place     Pt. was HDS and discharged back to [facility] on 1/21/25. Today, patient reports that she is feeling well, but tired. She reports appetite as \"fair\". She denies dizziness, HA, SOB, cough, or chest pain. She denies any pain. She feels that she is progressing well in therapy. Staff report no clinical concerns.     ROS:    As above in HPI. Otherwise, all other systems have been reviewed and are negative for complaint.    Medications reviewed and verified in NH chart.     Patient Active Problem List   Diagnosis    Hypothyroidism    Acute on chronic diastolic heart failure    Altered mental status    Anxiety    Chronic fatigue    Constipation by delayed colonic transit    Type 2 diabetes mellitus    Diabetic renal disease (Multi)    Gastroesophageal reflux disease    History of renal cell carcinoma    Hypertension    Peripheral vascular disease (CMS-HCC)    Leukocytosis    Lung nodule    Major depressive disorder, single episode, unspecified    Osteoarthritis    Paroxysmal atrial fibrillation (Multi)    Primary insomnia    Stage 3b chronic kidney disease (CKD) (Multi)    Trouble walking    Hyperlipidemia, unspecified    ESRD on dialysis (Multi)    ANDREA (acute kidney injury)    ASHD (arteriosclerotic heart disease)    " At risk for falling    Essential tremor    Vitamin D deficiency    Acute on chronic respiratory failure with hypoxia and hypercapnia (Multi)    Pulmonary hypertension (Multi)    Pulmonary edema    Influenza B    History of right nephrectomy    Cognitive impairment    Unspecified open wound, right lower leg, initial encounter    Chronic kidney disease, stage V (Multi)    Cellulitis of right lower extremity    Open wound of right forearm    Open wound of right upper arm    Open wound of left lower leg    Abrasion of buttock, infected    Acute cystitis with hematuria    Complication associated with dialysis catheter    Cellulitis    Iron deficiency anemia due to chronic blood loss    Cerebrovascular accident (CVA) (Multi)    CKD (chronic kidney disease) stage V requiring chronic dialysis (Multi)    ESRD (end stage renal disease) (Multi)    End stage renal disease (Multi)    Influenza    Recurrent pleural effusion    Chronic respiratory failure    Non-pressure chronic ulcer of left lower leg, limited to breakdown of skin    Hemiplegia and hemiparesis following cerebral infarction affecting right dominant side (Multi)    Moderate episode of recurrent major depressive disorder    Malignant neoplasm of right kidney (Multi)        Past Medical History:   Diagnosis Date    Anal fissure 10/12/2023    Anemia     ASHD (arteriosclerotic heart disease)     At risk for falls     Atrial fibrillation (Multi)     AV graft malfunction     CHF (congestive heart failure)     CKD (chronic kidney disease)     COPD (chronic obstructive pulmonary disease) (Multi)     CVA (cerebral vascular accident) (Multi)     2021- right-sided weakness    Depression     Diabetes mellitus (Multi)     Encephalopathy     End-stage renal disease on hemodialysis (Multi)     GERD (gastroesophageal reflux disease)     H/O pleural effusion     Hyperlipidemia     Hypertension     Hypothyroidism     Hypoxia     Impacted cerumen, left ear     Impacted cerumen of  left ear    Influenza A     Noncompliance     Osteoarthritis     Perianal dermatitis 10/12/2023    Personal history of other diseases of the respiratory system     History of acute bronchitis    Pleural effusion     PVD (peripheral vascular disease) (CMS-HCC)     Renal carcinoma, right (Multi)     s/p partial nephrectomy 2021    Respiratory failure (Multi)     Right sided weakness     TIA (transient ischemic attack)     Vasculitis (CMS-HCC) 10/12/2023    Weakness        Past Surgical History:   Procedure Laterality Date    ANKLE SURGERY          CARDIAC CATHETERIZATION N/A 2024    Procedure: Right Heart Cath;  Surgeon: Nicholas Antonio MD;  Location: [facility] Cardiac Cath Lab;  Service: Cardiovascular;  Laterality: N/A;    CATARACT EXTRACTION Right     2019     SECTION, CLASSIC      MR CHEST ANGIO W AND WO IV CONTRAST  2023    MR CHEST ANGIO W AND WO IV CONTRAST 2023 [facility]    MR HEAD ANGIO WO IV CONTRAST  2021    MR HEAD ANGIO WO IV CONTRAST [facility]    NEPHRECTOMY  2021    SHOULDER SURGERY      2009       Family History   Problem Relation Name Age of Onset    Hypertension Mother      Diabetes Father      Heart disease Father      Cancer Sister      Cancer Brother      Diabetes Daughter      Asthma Daughter      Heart attack Daughter      Diabetes Maternal Grandfather      Heart disease Paternal Grandmother      Diabetes Other      Hypertension Other      COPD Other      Other (chronic lung disease) Other         Social History     Tobacco Use   Smoking Status Every Day    Current packs/day: 0.50    Average packs/day: 0.5 packs/day for 56.2 years (28.1 ttl pk-yrs)    Types: Cigarettes    Start date: 1969    Passive exposure: Never   Smokeless Tobacco Never       Social History     Substance and Sexual Activity   Alcohol Use Not Currently       Social History     Substance and Sexual Activity   Drug Use Not Currently    Types: Marijuana        Allergies   Allergen Reactions    Bee Venom Protein (Honey Bee) Swelling    Citalopram Hives, Other, Rash and Nausea/vomiting     Facial breakout, blisters, and rash    Codeine Headache, Other and Itching     Migraines    Influenza Virus Vaccines Hives    Latex Other     blisters    Latex, Natural Rubber Other     blisters    Lisinopril Swelling and Nausea/vomiting     Facial swelling    Penicillins Swelling, Headache and Unknown    Adhesive Tape-Silicones Other     blisters    Amoxicillin Swelling and Rash    Doxycycline Rash and Unknown    Oseltamivir Rash and Nausea/vomiting    Phenyleph-Min Oil-Petrolatum Other    Phenyleph-Shark Oil-Glyc-Pet Rash    Phenylephrine Nausea/vomiting    Prednisone Other and Nausea/vomiting     Yeast infection    Tuberculin Ppd Unknown    Xylometazoline Unknown        Vital Signs:   122/62-78-16-98.9-95% on RA     Physical Exam:  General: Sitting up in WC in NAD, alert   Head/Face: NCAT, symmetrical  Eyes: PERRLA, no injection, no discharge  ENT: Hearing not impaired, ears without scars or lesions, nasal mucosa and turbinates pink, septum midline, lips pink and moist  Neck: Supple, symmetrical  Respiratory: CTA but diminished without adventitious sounds, respirations even and nonlabored without use of accessory muscles, good air exchange  Cardio: Irregular rhythm, normal rate without murmur or gallops, normal S1S2, no edema, pedal pulses 2+/4 bilaterally  Chest/Breast: Symmetrical, tunneled dialysis cath to R chest   GI: BS x 4, normoactive, non-distended, abd round and soft, no masses or tenderness  : No suprapubic tenderness or distention  MSK: Gait not assessed, joints with full ROM without pain or contractures, + generalized weakness  Skin: Skin warm and dry, no induration, dry skin to BLE, R chest dialysis port, RUE fistula  Neurologic: Cranial nerves II through XII intact, superficial touch and pain sensation intact  Psychiatric: Alert, oriented x 2-3, calm and  cooperative     Results/Data:   2/7/25: Glu 130, BUN 39, Cr 3.10, K+ 5.6, GFR 16, Hgb 7.0, Hct 24.0  1/31/25: Hgb 7.4, Hct 24.6, Plt 408, BUN 19, Cr 1.8, Na+ 136, Kel 7.5, GFR 30  1/30/25: Alb 2.9, Phos 2.2, Hgb 7.5 (dialysis labs)  1/24/25: HgbA1C 7.8, Glu 107, BUN 18, Cr 2.0, Na+ 135, Kel 8.1, GFR 26, Chol 93, HDL 36, TSH 3.15, T4 10.8, Hgb 7.3, Hct 23.7, Plt 443  4/11/24: K+ 4.7, Phos 4.7  3/27/24: Glu 108, BUN 23 Cr 1.59, GFR 34, CO2 34, Kel 7.9, Hgb 7.8, Hct 26.3  3/18/24: BUN 38, Cr 2.41, GFR 21, Na+ 136, K+ 3.5, Kel 7.4, Hgb 7.3, Hct 23.8, Plt 175  3/14/24: Glu 24, Hgb 6.9  3/9/24: Glu 95, K+ 3.4, CO2 33, BUN 7, Cr 0.84, GFR 75, Kel 7.6, Alb 2.6, Hgb 7.9, Hct 27.0  3/7/24: HgbA1C 7.5  3/6/24: HgbA1C 7.7, Chol 147, , HDL 65, LDL 51    Assessment/Plan:  Acute on chronic hypoxia and hypercapnic respiratory failure/Influenza A/recurrent right pleural effusion-Influenza A and acute respiratory failure have resolved, s/p R thoracentesis with removal of 1 L of fluid (1/3/25), 2 gm Na+ diet, 1500 ml fluid restriction, c/w supplemental O2 (3 L at HS; 2-3 L during the day prn), Albuterol prn, monitor respiratory status closely   Delirium-RESOLVED, 2/2 hypercapnia, continue to monitor neuro status closely  Physical deconditioning/weakness-c/w PT/OT, safety and fall precautions   Clotted renal dialysis AV graft-s/p thrombectomy with axillary vein stenting on 1/13/25, tunneled venous central catheter placed on 1/15. vascular recommended resting the right arm x 2 weeks and using the tunneled dialysis catheter for treatments, F/U with vascular surgery as scheduled  ESRD-c/w HD, c/w renal vitamin, monitor renal function, nephro following  Bradycardia-RESOLVED, monitor HR  AFib/CAD/HTN/HLD/HFpEF-s/p Watchman procedure (Aug. 2023), s/p RHC (2/27/24) which showed mild pulmonary hypertension, c/w amiodarone, aspirin, atorvastatin, and plavix, amlodipine stopped during hospitalization, metoprolol dosing changed during  hospital stay due to bradycardia, but was not reconciled on discharge medication list, BP and HR stable, will continue to hold for now, continue to monitor BP and HR, F/U with cardiologist as scheduled  Anemia-c/w iron, monitor CBC  GERD-c/w PPI  Depression-c/w venlafaxine, supportive care  DM2-LCS diet, accuchecks, c/w lispro ISS with meals, lantus stopped due to hypoglycemia, c/w BP and lipid control, yearly eye exam, diabetic foot care, monitor HgbA1C  Hypothyroidism/left thyroid nodule-c/w levothyroxine, monitor TSH and FT4  Arthritis-c/w Tylenol 1000 mg BID, encourage ROM   Hypophosphatemia-monitor phosphorus level, RD following  Hypoalbuminemia/weight loss-encourage PO intake, increase protein intake, c/w renal vitamin, monitor weights, RD following     Orders:  NNO    Code Status:   DNR-CCA/DNI

## 2025-04-22 ENCOUNTER — NURSING HOME VISIT (OUTPATIENT)
Dept: POST ACUTE CARE | Facility: EXTERNAL LOCATION | Age: 70
End: 2025-04-22
Payer: MEDICARE

## 2025-04-22 DIAGNOSIS — J30.9 ALLERGIC RHINITIS, UNSPECIFIED SEASONALITY, UNSPECIFIED TRIGGER: ICD-10-CM

## 2025-04-22 DIAGNOSIS — F32.A DEPRESSION, UNSPECIFIED DEPRESSION TYPE: ICD-10-CM

## 2025-04-22 DIAGNOSIS — E88.09 HYPOALBUMINEMIA: ICD-10-CM

## 2025-04-22 DIAGNOSIS — G47.00 INSOMNIA, UNSPECIFIED TYPE: Primary | ICD-10-CM

## 2025-04-22 DIAGNOSIS — J90 PLEURAL EFFUSION ON RIGHT: ICD-10-CM

## 2025-04-22 DIAGNOSIS — N18.6 ESRD (END STAGE RENAL DISEASE) (MULTI): ICD-10-CM

## 2025-04-22 DIAGNOSIS — I10 HYPERTENSION, UNSPECIFIED TYPE: ICD-10-CM

## 2025-04-22 DIAGNOSIS — E78.5 HYPERLIPIDEMIA, UNSPECIFIED HYPERLIPIDEMIA TYPE: ICD-10-CM

## 2025-04-22 PROCEDURE — 99309 SBSQ NF CARE MODERATE MDM 30: CPT | Performed by: NURSE PRACTITIONER

## 2025-04-22 NOTE — LETTER
Patient: Daphne Morris  : 1955    Encounter Date: 2025    Chief Complaint:   Monthly visit for management of chronic disease    HPI:   Pt. seen today for monthly visit. Today, patient reports that she is feeling tired. She is c/o insomnia. She is eating about 75% of meals, on average, and is noted to have ~ 7.6 lb weight loss x 1 month. She denies dizziness, HA, SOB, cough, or chest pain. She denies any pain. Staff report no clinical concerns.     ROS:    As above in HPI. Otherwise, all other systems have been reviewed and are negative for complaint.    Medications reviewed and verified in NH chart.     Patient Active Problem List   Diagnosis   • Hypothyroidism   • Acute on chronic diastolic heart failure   • Altered mental status   • Anxiety   • Chronic fatigue   • Constipation by delayed colonic transit   • Type 2 diabetes mellitus   • Diabetic renal disease (Multi)   • Gastroesophageal reflux disease   • History of renal cell carcinoma   • Hypertension   • Peripheral vascular disease   • Leukocytosis   • Lung nodule   • Major depressive disorder, single episode, unspecified   • Osteoarthritis   • Paroxysmal atrial fibrillation (Multi)   • Primary insomnia   • Stage 3b chronic kidney disease (CKD) (Multi)   • Trouble walking   • Hyperlipidemia, unspecified   • ESRD on dialysis (Multi)   • ANDREA (acute kidney injury)   • ASHD (arteriosclerotic heart disease)   • At risk for falling   • Essential tremor   • Vitamin D deficiency   • Acute on chronic respiratory failure with hypoxia and hypercapnia   • Pulmonary hypertension (Multi)   • Pulmonary edema   • Influenza B   • History of right nephrectomy   • Cognitive impairment   • Unspecified open wound, right lower leg, initial encounter   • Chronic kidney disease, stage V (Multi)   • Cellulitis of right lower extremity   • Open wound of right forearm   • Open wound of right upper arm   • Open wound of left lower leg   • Abrasion of buttock, infected   •  Acute cystitis with hematuria   • Complication associated with dialysis catheter   • Cellulitis   • Iron deficiency anemia due to chronic blood loss   • Cerebrovascular accident (CVA) (Multi)   • CKD (chronic kidney disease) stage V requiring chronic dialysis (Multi)   • ESRD (end stage renal disease) (Multi)   • End stage renal disease (Multi)   • Influenza   • Recurrent pleural effusion   • Chronic respiratory failure   • Non-pressure chronic ulcer of left lower leg, limited to breakdown of skin   • Hemiplegia and hemiparesis following cerebral infarction affecting right dominant side (Multi)   • Moderate episode of recurrent major depressive disorder   • Malignant neoplasm of right kidney (Multi)        Past Medical History:   Diagnosis Date   • Anal fissure 10/12/2023   • Anemia    • ASHD (arteriosclerotic heart disease)    • At risk for falls    • Atrial fibrillation (Multi)    • AV graft malfunction    • CHF (congestive heart failure)    • CKD (chronic kidney disease)    • COPD (chronic obstructive pulmonary disease) (Multi)    • CVA (cerebral vascular accident) (Multi)     2021- right-sided weakness   • Depression    • Diabetes mellitus (Multi)    • Encephalopathy    • End-stage renal disease on hemodialysis (Multi)    • GERD (gastroesophageal reflux disease)    • H/O pleural effusion    • Hyperlipidemia    • Hypertension    • Hypothyroidism    • Hypoxia    • Impacted cerumen, left ear     Impacted cerumen of left ear   • Influenza A    • Noncompliance    • Osteoarthritis    • Perianal dermatitis 10/12/2023   • Personal history of other diseases of the respiratory system     History of acute bronchitis   • Pleural effusion    • PVD (peripheral vascular disease)    • Renal carcinoma, right (Multi)     s/p partial nephrectomy 8/2021   • Respiratory failure (Multi)    • Right sided weakness    • TIA (transient ischemic attack)    • Vasculitis 10/12/2023   • Weakness        Past Surgical History:   Procedure  Laterality Date   • ANKLE SURGERY         • CARDIAC CATHETERIZATION N/A 2024    Procedure: Right Heart Cath;  Surgeon: Nicholas Antonio MD;  Location: [facility];  Service: Cardiovascular;  Laterality: N/A;   • CATARACT EXTRACTION Right     2019   •  SECTION, CLASSIC     • MR CHEST ANGIO W AND WO IV CONTRAST  2023    MR CHEST ANGIO W AND WO IV CONTRAST 2023 [facility] MRI   • MR HEAD ANGIO WO IV CONTRAST  2021    MR HEAD ANGIO WO IV CONTRAST [facility]   • NEPHRECTOMY  2021   • SHOULDER SURGERY             Family History   Problem Relation Name Age of Onset   • Hypertension Mother     • Diabetes Father     • Heart disease Father     • Cancer Sister     • Cancer Brother     • Diabetes Daughter     • Asthma Daughter     • Heart attack Daughter     • Diabetes Maternal Grandfather     • Heart disease Paternal Grandmother     • Diabetes Other     • Hypertension Other     • COPD Other     • Other (chronic lung disease) Other         Social History     Tobacco Use   Smoking Status Every Day   • Current packs/day: 0.50   • Average packs/day: 0.5 packs/day for 56.5 years (28.3 ttl pk-yrs)   • Types: Cigarettes   • Start date: 1969   • Passive exposure: Never   Smokeless Tobacco Never       Social History     Substance and Sexual Activity   Alcohol Use Not Currently       Social History     Substance and Sexual Activity   Drug Use Not Currently   • Types: Marijuana       Allergies   Allergen Reactions   • Bee Venom Protein (Honey Bee) Swelling   • Citalopram Hives, Other, Rash and Nausea/vomiting     Facial breakout, blisters, and rash   • Codeine Headache, Other and Itching     Migraines   • Influenza Virus Vaccines Hives   • Latex Other     blisters   • Latex, Natural Rubber Other     blisters   • Lisinopril Swelling and Nausea/vomiting     Facial swelling   • Penicillins Swelling, Headache and Unknown   • Adhesive Tape-Silicones Other     blisters    • Amoxicillin Swelling and Rash   • Doxycycline Rash and Unknown   • Oseltamivir Rash and Nausea/vomiting   • Phenyleph-Min Oil-Petrolatum Other   • Phenyleph-Shark Oil-Glyc-Pet Rash   • Phenylephrine Nausea/vomiting   • Prednisone Other and Nausea/vomiting     Yeast infection   • Tuberculin Ppd Unknown   • Xylometazoline Unknown        Vital Signs:   122/72-76-18-98.3-96% on RA     Physical Exam:  General: Sitting up in WC in NAD, alert   Head/Face: NCAT, symmetrical  Eyes: PERRLA, no injection, no discharge  ENT: Hearing not impaired, ears without scars or lesions, nasal mucosa and turbinates pink, septum midline, lips pink and moist  Neck: Supple, symmetrical  Respiratory: CTA but diminished without adventitious sounds, respirations even and nonlabored without use of accessory muscles, good air exchange  Cardio: Irregular rhythm, normal rate without murmur or gallops, normal S1S2, no edema, pedal pulses 2+/4 bilaterally  Chest/Breast: Symmetrical   GI: BS x 4, normoactive, non-distended, abd round and soft, no masses or tenderness  : No suprapubic tenderness or distention  MSK: Gait not assessed, joints with full ROM without pain or contractures, + generalized weakness  Skin: Skin warm and dry, no induration, dry skin to BLE, RUE fistula (+ bruit/+ thrill)   Neurologic: Cranial nerves II through XII intact, superficial touch and pain sensation intact  Psychiatric: Alert, oriented x 2-3, calm and cooperative     Results/Data:   2/21/15: RSV positive   2/7/25: Glu 130, BUN 39, Cr 3.10, K+ 5.6, GFR 16, Hgb 7.0, Hct 24.0  1/31/25: Hgb 7.4, Hct 24.6, Plt 408, BUN 19, Cr 1.8, Na+ 136, Kel 7.5, GFR 30  1/30/25: Alb 2.9, Phos 2.2, Hgb 7.5 (dialysis labs)  1/24/25: HgbA1C 7.8, Glu 107, BUN 18, Cr 2.0, Na+ 135, Kel 8.1, GFR 26, Chol 93, HDL 36, TSH 3.15, T4 10.8, Hgb 7.3, Hct 23.7, Plt 443  4/11/24: K+ 4.7, Phos 4.7    Assessment/Plan:  Chronic hypoxia and hypercapnic respiratory failure/recurrent right pleural  effusion-2 gm Na+ diet, 1500 ml fluid restriction, c/w supplemental O2 (3 L at HS; 2-3 L during the day prn), Albuterol prn, monitor respiratory status closely   ESRD-c/w HD, c/w renal vitamin, monitor renal function, nephro following  AFib/CAD/HTN/HLD/HFpEF-s/p Watchman procedure (Aug. 2023), s/p RHC (2/27/24) which showed mild pulmonary hypertension, c/w amiodarone, aspirin, atorvastatin, and plavix, continue to monitor BP and HR, F/U with cardiologist as scheduled  Anemia-c/w iron, monitor CBC  GERD-c/w PPI  Depression-c/w venlafaxine, supportive care, Psych following   DM2-LCS diet, accuchecks, c/w lispro ISS with meals, lantus stopped due to hypoglycemia, c/w BP and lipid control, yearly eye exam, diabetic foot care, monitor HgbA1C  Hypothyroidism/left thyroid nodule-c/w levothyroxine, monitor TSH and FT4  Arthritis-c/w Tylenol 1000 mg BID, Tramadol prn for mod-severe pain, encourage ROM   Hypophosphatemia-monitor phosphorus level, RD following  Hypoalbuminemia/weight loss-encourage PO intake, increase protein intake, c/w renal vitamin, monitor weights, RD following   Insomnia-increase melatonin to 6 mg Q HS, monitor  Allergic rhinitis-c/w zyrtec    Orders:  Increase melatonin to 6 mg PO Q HS for insomnia     Code Status:   DNR-CCA/DNI        Electronically Signed By: GREGORIA Olivares   7/14/25  1:28 PM

## 2025-04-24 ENCOUNTER — LAB REQUISITION (OUTPATIENT)
Dept: LAB | Facility: HOSPITAL | Age: 70
End: 2025-04-24
Payer: COMMERCIAL

## 2025-04-24 DIAGNOSIS — N18.6 END STAGE RENAL DISEASE (MULTI): ICD-10-CM

## 2025-04-24 DIAGNOSIS — G89.29 CHRONIC BILATERAL LOW BACK PAIN WITHOUT SCIATICA: Primary | ICD-10-CM

## 2025-04-24 DIAGNOSIS — M54.50 CHRONIC BILATERAL LOW BACK PAIN WITHOUT SCIATICA: Primary | ICD-10-CM

## 2025-04-24 LAB
BUN SERPL-MCNC: 32 MG/DL (ref 6–23)
POTASSIUM SERPL-SCNC: 3.8 MMOL/L (ref 3.5–5.3)

## 2025-04-24 PROCEDURE — 84132 ASSAY OF SERUM POTASSIUM: CPT

## 2025-04-24 PROCEDURE — 84520 ASSAY OF UREA NITROGEN: CPT

## 2025-04-25 LAB — BUN SERPL-MCNC: 7 MG/DL (ref 6–23)

## 2025-04-28 RX ORDER — TRAMADOL HYDROCHLORIDE 50 MG/1
50 TABLET ORAL EVERY 8 HOURS PRN
Qty: 10 TABLET | Refills: 0 | Status: SHIPPED | OUTPATIENT
Start: 2025-04-28

## 2025-05-04 ENCOUNTER — NURSING HOME VISIT (OUTPATIENT)
Dept: POST ACUTE CARE | Facility: EXTERNAL LOCATION | Age: 70
End: 2025-05-04
Payer: MEDICARE

## 2025-05-04 DIAGNOSIS — I10 HYPERTENSION, UNSPECIFIED TYPE: ICD-10-CM

## 2025-05-04 DIAGNOSIS — N18.6 ESRD (END STAGE RENAL DISEASE) (MULTI): ICD-10-CM

## 2025-05-04 DIAGNOSIS — K21.9 GASTROESOPHAGEAL REFLUX DISEASE WITHOUT ESOPHAGITIS: ICD-10-CM

## 2025-05-04 DIAGNOSIS — E78.5 HYPERLIPIDEMIA, UNSPECIFIED HYPERLIPIDEMIA TYPE: ICD-10-CM

## 2025-05-04 DIAGNOSIS — F32.A DEPRESSION, UNSPECIFIED DEPRESSION TYPE: ICD-10-CM

## 2025-05-04 DIAGNOSIS — I48.91 ATRIAL FIBRILLATION, UNSPECIFIED TYPE (MULTI): ICD-10-CM

## 2025-05-04 DIAGNOSIS — J90 PLEURAL EFFUSION ON RIGHT: ICD-10-CM

## 2025-05-04 DIAGNOSIS — R53.1 WEAKNESS: ICD-10-CM

## 2025-05-04 DIAGNOSIS — I63.9 CEREBROVASCULAR ACCIDENT (CVA), UNSPECIFIED MECHANISM (MULTI): ICD-10-CM

## 2025-05-04 DIAGNOSIS — Z91.81 AT RISK FOR FALLING: ICD-10-CM

## 2025-05-04 DIAGNOSIS — G93.40 ENCEPHALOPATHY, UNSPECIFIED TYPE: ICD-10-CM

## 2025-05-04 DIAGNOSIS — J44.9 CHRONIC OBSTRUCTIVE PULMONARY DISEASE, UNSPECIFIED COPD TYPE (MULTI): Primary | ICD-10-CM

## 2025-05-04 DIAGNOSIS — J96.90 RESPIRATORY FAILURE, UNSPECIFIED CHRONICITY, UNSPECIFIED WHETHER WITH HYPOXIA OR HYPERCAPNIA: ICD-10-CM

## 2025-05-04 DIAGNOSIS — I50.9 CONGESTIVE HEART FAILURE, UNSPECIFIED HF CHRONICITY, UNSPECIFIED HEART FAILURE TYPE: ICD-10-CM

## 2025-05-04 PROCEDURE — 99309 SBSQ NF CARE MODERATE MDM 30: CPT | Performed by: INTERNAL MEDICINE

## 2025-05-04 NOTE — LETTER
Patient: Daphne Morris  : 1955    Encounter Date: 2025    PLACE OF SERVICE:  [facility]    This is a subsequent visit.    Subjective  Patient ID: Daphne Morris is a 69 y.o. female who presents for Follow-up.    Ms. Cecelia Morris is a 69-year-old female with history of heart failure and COPD.  She suffers from end-stage renal disease, on hemodialysis.  She is unable to care for herself and requires supportive care.    Review of Systems   Constitutional:  Negative for chills and fever.   Cardiovascular:  Negative for chest pain.   All other systems reviewed and are negative.    Objective  /84   Pulse 82   Temp 36.7 °C (98 °F)   Resp 18     Physical Exam  Vitals reviewed.   Constitutional:       General: She is not in acute distress.     Comments: This is a well-developed, well-nourished female, sitting in a chair.   HENT:      Right Ear: Tympanic membrane, ear canal and external ear normal.      Left Ear: Tympanic membrane, ear canal and external ear normal.   Eyes:      General: No scleral icterus.     Pupils: Pupils are equal, round, and reactive to light.   Neck:      Vascular: No carotid bruit.   Cardiovascular:      Heart sounds: Normal heart sounds, S1 normal and S2 normal. No murmur heard.     No friction rub.   Pulmonary:      Breath sounds: Decreased breath sounds (throughout) present.   Abdominal:      Palpations: There is no hepatomegaly, splenomegaly or mass.   Musculoskeletal:         General: No swelling or deformity. Normal range of motion.      Cervical back: Neck supple.      Right lower leg: Edema present.      Left lower leg: Edema present.   Lymphadenopathy:      Cervical: No cervical adenopathy.      Upper Body:      Right upper body: No axillary adenopathy.      Left upper body: No axillary adenopathy.      Lower Body: No right inguinal adenopathy. No left inguinal adenopathy.   Neurological:      Mental Status: She is oriented  to person, place, and time.      Cranial Nerves: Cranial nerves 2-12 are intact. No cranial nerve deficit.      Sensory: No sensory deficit.      Motor: Motor function is intact. No weakness.      Gait: Gait is intact.      Deep Tendon Reflexes: Reflexes normal.   Psychiatric:         Mood and Affect: Mood normal. Mood is not anxious or depressed. Affect is not angry.         Behavior: Behavior is not agitated.         Thought Content: Thought content normal.         Judgment: Judgment normal.     LAB WORK:  Laboratory studies were reviewed.    Assessment/Plan  Problem List Items Addressed This Visit           ICD-10-CM    Gastroesophageal reflux disease K21.9    Hypertension I10    Hyperlipidemia, unspecified E78.5    At risk for falling Z91.81    Cerebrovascular accident (CVA) (Multi) I63.9    ESRD (end stage renal disease) (Multi) N18.6     Other Visit Diagnoses         Codes      Chronic obstructive pulmonary disease, unspecified COPD type (Multi)    -  Primary J44.9      Congestive heart failure, unspecified HF chronicity, unspecified heart failure type     I50.9      Respiratory failure, unspecified chronicity, unspecified whether with hypoxia or hypercapnia     J96.90      Pleural effusion on right     J90      Atrial fibrillation, unspecified type (Multi)     I48.91      Encephalopathy, unspecified type     G93.40      Depression, unspecified depression type     F32.A      Weakness     R53.1        1. COPD, on bronchodilator therapy.  2. Heart failure, on diuretic.  3. Respiratory failure, on oxygen.  4. End-stage renal disease, on hemodialysis.  5. CVA, on supportive care.  6. Pleural effusion.  Follow with chest x-ray.  7. Atrial fibrillation, on rate control.  8. Hypertension, medically controlled.  9. Hyperlipidemia, on statin.  10. Encephalopathy, on supportive care.  11. Depression, on medication.  12. GERD, on PPI.  13. Weakness, on PT/OT.  14. Fall risk, on fall precautions.    Scribe Attestation  By  signing my name below, I, Ann Santos attest that this documentation has been prepared under the direction and in the presence of Cale Lowe MD.     All medical record entries made by the anandibe were personally dictated by me I have reviewed the chart and agree the record accurately reflects my personal performance of his history physical examination and management      Electronically Signed By: Cale Lowe MD   5/9/25 12:01 AM

## 2025-05-06 VITALS
SYSTOLIC BLOOD PRESSURE: 140 MMHG | DIASTOLIC BLOOD PRESSURE: 84 MMHG | TEMPERATURE: 98 F | HEART RATE: 82 BPM | RESPIRATION RATE: 18 BRPM

## 2025-05-06 ASSESSMENT — ENCOUNTER SYMPTOMS
FEVER: 0
CHILLS: 0

## 2025-05-06 NOTE — PROGRESS NOTES
PLACE OF SERVICE:  [facility]    This is a subsequent visit.    Subjective   Patient ID: Daphne Morris is a 69 y.o. female who presents for Follow-up.    Ms. Cecelia Morris is a 69-year-old female with history of heart failure and COPD.  She suffers from end-stage renal disease, on hemodialysis.  She is unable to care for herself and requires supportive care.    Review of Systems   Constitutional:  Negative for chills and fever.   Cardiovascular:  Negative for chest pain.   All other systems reviewed and are negative.    Objective   /84   Pulse 82   Temp 36.7 °C (98 °F)   Resp 18     Physical Exam  Vitals reviewed.   Constitutional:       General: She is not in acute distress.     Comments: This is a well-developed, well-nourished female, sitting in a chair.   HENT:      Right Ear: Tympanic membrane, ear canal and external ear normal.      Left Ear: Tympanic membrane, ear canal and external ear normal.   Eyes:      General: No scleral icterus.     Pupils: Pupils are equal, round, and reactive to light.   Neck:      Vascular: No carotid bruit.   Cardiovascular:      Heart sounds: Normal heart sounds, S1 normal and S2 normal. No murmur heard.     No friction rub.   Pulmonary:      Breath sounds: Decreased breath sounds (throughout) present.   Abdominal:      Palpations: There is no hepatomegaly, splenomegaly or mass.   Musculoskeletal:         General: No swelling or deformity. Normal range of motion.      Cervical back: Neck supple.      Right lower leg: Edema present.      Left lower leg: Edema present.   Lymphadenopathy:      Cervical: No cervical adenopathy.      Upper Body:      Right upper body: No axillary adenopathy.      Left upper body: No axillary adenopathy.      Lower Body: No right inguinal adenopathy. No left inguinal adenopathy.   Neurological:      Mental Status: She is oriented to person, place, and time.      Cranial Nerves: Cranial nerves 2-12  are intact. No cranial nerve deficit.      Sensory: No sensory deficit.      Motor: Motor function is intact. No weakness.      Gait: Gait is intact.      Deep Tendon Reflexes: Reflexes normal.   Psychiatric:         Mood and Affect: Mood normal. Mood is not anxious or depressed. Affect is not angry.         Behavior: Behavior is not agitated.         Thought Content: Thought content normal.         Judgment: Judgment normal.     LAB WORK:  Laboratory studies were reviewed.    Assessment/Plan   Problem List Items Addressed This Visit           ICD-10-CM    Gastroesophageal reflux disease K21.9    Hypertension I10    Hyperlipidemia, unspecified E78.5    At risk for falling Z91.81    Cerebrovascular accident (CVA) (Multi) I63.9    ESRD (end stage renal disease) (Multi) N18.6     Other Visit Diagnoses         Codes      Chronic obstructive pulmonary disease, unspecified COPD type (Multi)    -  Primary J44.9      Congestive heart failure, unspecified HF chronicity, unspecified heart failure type     I50.9      Respiratory failure, unspecified chronicity, unspecified whether with hypoxia or hypercapnia     J96.90      Pleural effusion on right     J90      Atrial fibrillation, unspecified type (Multi)     I48.91      Encephalopathy, unspecified type     G93.40      Depression, unspecified depression type     F32.A      Weakness     R53.1        1. COPD, on bronchodilator therapy.  2. Heart failure, on diuretic.  3. Respiratory failure, on oxygen.  4. End-stage renal disease, on hemodialysis.  5. CVA, on supportive care.  6. Pleural effusion.  Follow with chest x-ray.  7. Atrial fibrillation, on rate control.  8. Hypertension, medically controlled.  9. Hyperlipidemia, on statin.  10. Encephalopathy, on supportive care.  11. Depression, on medication.  12. GERD, on PPI.  13. Weakness, on PT/OT.  14. Fall risk, on fall precautions.    Scribe Attestation  By signing my name below, IAnn, Scribe attest that this  documentation has been prepared under the direction and in the presence of Cale Lowe MD.     All medical record entries made by the scribe were personally dictated by me I have reviewed the chart and agree the record accurately reflects my personal performance of his history physical examination and management

## 2025-05-08 ENCOUNTER — NURSING HOME VISIT (OUTPATIENT)
Dept: POST ACUTE CARE | Facility: EXTERNAL LOCATION | Age: 70
End: 2025-05-08
Payer: MEDICARE

## 2025-05-08 DIAGNOSIS — R06.2 WHEEZING: ICD-10-CM

## 2025-05-08 DIAGNOSIS — R05.1 ACUTE COUGH: ICD-10-CM

## 2025-05-08 DIAGNOSIS — R06.00 DYSPNEA, UNSPECIFIED TYPE: Primary | ICD-10-CM

## 2025-05-08 PROCEDURE — 99309 SBSQ NF CARE MODERATE MDM 30: CPT | Performed by: NURSE PRACTITIONER

## 2025-05-08 NOTE — LETTER
Patient: Daphne Morris  : 1955    Encounter Date: 2025    Chief Complaint:   Sinus congestion  Shortness of breath   Cough    HPI:   Pt. seen today for c/o sinus congestion, shortness of breath, and cough. She reports s/s x 2 days. She reports a productive cough. She denies dizziness, HA, or chest pain. She denies any known sick contacts, but does reside in a LTC facility. She has been afebrile. Staff report no other clinical concerns at this time.     ROS:    As above in HPI. Otherwise, all other systems have been reviewed and are negative for complaint.    Medications reviewed and verified in NH chart.     Patient Active Problem List   Diagnosis   • Hypothyroidism   • Acute on chronic diastolic heart failure   • Altered mental status   • Anxiety   • Chronic fatigue   • Constipation by delayed colonic transit   • Type 2 diabetes mellitus   • Diabetic renal disease (Multi)   • Gastroesophageal reflux disease   • History of renal cell carcinoma   • Hypertension   • Peripheral vascular disease   • Leukocytosis   • Lung nodule   • Major depressive disorder, single episode, unspecified   • Osteoarthritis   • Paroxysmal atrial fibrillation (Multi)   • Primary insomnia   • Stage 3b chronic kidney disease (CKD) (Multi)   • Trouble walking   • Hyperlipidemia, unspecified   • ESRD on dialysis (Multi)   • ANDREA (acute kidney injury)   • ASHD (arteriosclerotic heart disease)   • At risk for falling   • Essential tremor   • Vitamin D deficiency   • Acute on chronic respiratory failure with hypoxia and hypercapnia   • Pulmonary hypertension (Multi)   • Pulmonary edema   • Influenza B   • History of right nephrectomy   • Cognitive impairment   • Unspecified open wound, right lower leg, initial encounter   • Chronic kidney disease, stage V (Multi)   • Cellulitis of right lower extremity   • Open wound of right forearm   • Open wound of right upper arm   • Open wound of left lower leg   • Abrasion of buttock, infected    • Acute cystitis with hematuria   • Complication associated with dialysis catheter   • Cellulitis   • Iron deficiency anemia due to chronic blood loss   • Cerebrovascular accident (CVA) (Multi)   • CKD (chronic kidney disease) stage V requiring chronic dialysis (Multi)   • ESRD (end stage renal disease) (Multi)   • End stage renal disease (Multi)   • Influenza   • Recurrent pleural effusion   • Chronic respiratory failure   • Non-pressure chronic ulcer of left lower leg, limited to breakdown of skin   • Hemiplegia and hemiparesis following cerebral infarction affecting right dominant side (Multi)   • Moderate episode of recurrent major depressive disorder   • Malignant neoplasm of right kidney (Multi)        Past Medical History:   Diagnosis Date   • Anal fissure 10/12/2023   • Anemia    • ASHD (arteriosclerotic heart disease)    • At risk for falls    • Atrial fibrillation (Multi)    • AV graft malfunction    • CHF (congestive heart failure)    • CKD (chronic kidney disease)    • COPD (chronic obstructive pulmonary disease) (Multi)    • CVA (cerebral vascular accident) (Multi)     2021- right-sided weakness   • Depression    • Diabetes mellitus (Multi)    • Encephalopathy    • End-stage renal disease on hemodialysis (Multi)    • GERD (gastroesophageal reflux disease)    • H/O pleural effusion    • Hyperlipidemia    • Hypertension    • Hypothyroidism    • Hypoxia    • Impacted cerumen, left ear     Impacted cerumen of left ear   • Influenza A    • Noncompliance    • Osteoarthritis    • Perianal dermatitis 10/12/2023   • Personal history of other diseases of the respiratory system     History of acute bronchitis   • Pleural effusion    • PVD (peripheral vascular disease)    • Renal carcinoma, right (Multi)     s/p partial nephrectomy 8/2021   • Respiratory failure (Multi)    • Right sided weakness    • TIA (transient ischemic attack)    • Vasculitis 10/12/2023   • Weakness        Past Surgical History:   Procedure  Laterality Date   • ANKLE SURGERY         • CARDIAC CATHETERIZATION N/A 2024    Procedure: Right Heart Cath;  Surgeon: Nicholas Antonio MD;  Location: [facility] Cardiac Cath Lab;  Service: Cardiovascular;  Laterality: N/A;   • CATARACT EXTRACTION Right     2019   •  SECTION, CLASSIC     • MR CHEST ANGIO W AND WO IV CONTRAST  2023    MR CHEST ANGIO W AND WO IV CONTRAST 2023 [facility] MRI   • MR HEAD ANGIO WO IV CONTRAST  2021    MR HEAD ANGIO WO IV CONTRAST [facility]   • NEPHRECTOMY  2021   • SHOULDER SURGERY             Family History   Problem Relation Name Age of Onset   • Hypertension Mother     • Diabetes Father     • Heart disease Father     • Cancer Sister     • Cancer Brother     • Diabetes Daughter     • Asthma Daughter     • Heart attack Daughter     • Diabetes Maternal Grandfather     • Heart disease Paternal Grandmother     • Diabetes Other     • Hypertension Other     • COPD Other     • Other (chronic lung disease) Other         Social History     Tobacco Use   Smoking Status Every Day   • Current packs/day: 0.50   • Average packs/day: 0.5 packs/day for 56.6 years (28.3 ttl pk-yrs)   • Types: Cigarettes   • Start date: 1969   • Passive exposure: Never   Smokeless Tobacco Never       Social History     Substance and Sexual Activity   Alcohol Use Not Currently       Social History     Substance and Sexual Activity   Drug Use Not Currently   • Types: Marijuana       Allergies   Allergen Reactions   • Bee Venom Protein (Honey Bee) Swelling   • Citalopram Hives, Other, Rash and Nausea/vomiting     Facial breakout, blisters, and rash   • Codeine Headache, Other and Itching     Migraines   • Influenza Virus Vaccines Hives   • Latex Other     blisters   • Latex, Natural Rubber Other     blisters   • Lisinopril Swelling and Nausea/vomiting     Facial swelling   • Penicillins Swelling, Headache and Unknown   • Adhesive Tape-Silicones Other     blisters    • Amoxicillin Swelling and Rash   • Doxycycline Rash and Unknown   • Oseltamivir Rash and Nausea/vomiting   • Phenyleph-Min Oil-Petrolatum Other   • Phenyleph-Shark Oil-Glyc-Pet Rash   • Phenylephrine Nausea/vomiting   • Prednisone Other and Nausea/vomiting     Yeast infection   • Tuberculin Ppd Unknown   • Xylometazoline Unknown        Vital Signs:   129/72-78-18-98.0-97% on RA     Physical Exam:  General: Sitting up in WC in NAD, alert   Head/Face: NCAT, symmetrical  Eyes: PERRLA, no injection, no discharge  ENT: Hearing not impaired, ears without scars or lesions, nasal mucosa and turbinates pink, septum midline, lips pink and moist  Neck: Supple, symmetrical  Respiratory: CTA but diminished with diffuse wheezing noted, respirations even and nonlabored without use of accessory muscles  Cardio: Irregular rhythm, normal rate without murmur or gallops, normal S1S2, no edema, pedal pulses 2+/4 bilaterally  Chest/Breast: Symmetrical   GI: BS x 4, normoactive, non-distended, abd round and soft, no masses or tenderness  : No suprapubic tenderness or distention  MSK: Gait not assessed, joints with full ROM without pain or contractures, + generalized weakness  Skin: Skin warm and dry, no induration, dry skin to BLE, RUE fistula (+ bruit/+ thrill)   Neurologic: Cranial nerves II through XII intact, superficial touch and pain sensation intact  Psychiatric: Alert, oriented x 2-3, calm and cooperative     Results/Data:   4/7/25: RSV positive   2/21/15: RSV positive   2/7/25: Glu 130, BUN 39, Cr 3.10, K+ 5.6, GFR 16, Hgb 7.0, Hct 24.0  1/31/25: Hgb 7.4, Hct 24.6, Plt 408, BUN 19, Cr 1.8, Na+ 136, Kel 7.5, GFR 30  1/30/25: Alb 2.9, Phos 2.2, Hgb 7.5 (dialysis labs)  1/24/25: HgbA1C 7.8, Glu 107, BUN 18, Cr 2.0, Na+ 135, Kel 8.1, GFR 26, Chol 93, HDL 36, TSH 3.15, T4 10.8, Hgb 7.3, Hct 23.7, Plt 443  4/11/24: K+ 4.7, Phos 4.7    Assessment/Plan:  Sinus congestion/shortness of breath/cough-Duonebs TID x 5 days,  mucinex/robitussin prn, patient encouraged to be OOB, monitor VS closely, monitor resp. status closely    Orders:  Duonebs TID x 5 days     Code Status:   DNR-CCA/DNI        Electronically Signed By: GREGORIA Olivares   7/30/25 10:53 PM

## 2025-06-03 DIAGNOSIS — Z79.4 TYPE 2 DIABETES MELLITUS WITH HYPERGLYCEMIA, WITH LONG-TERM CURRENT USE OF INSULIN: ICD-10-CM

## 2025-06-03 DIAGNOSIS — E11.65 TYPE 2 DIABETES MELLITUS WITH HYPERGLYCEMIA, WITH LONG-TERM CURRENT USE OF INSULIN: ICD-10-CM

## 2025-06-03 RX ORDER — BLOOD-GLUCOSE,RECEIVER,CONT
EACH MISCELLANEOUS
Qty: 1 EACH | Refills: 0 | Status: SHIPPED | OUTPATIENT
Start: 2025-06-03

## 2025-06-03 RX ORDER — BLOOD-GLUCOSE SENSOR
EACH MISCELLANEOUS
Qty: 2 EACH | Refills: 11 | Status: SHIPPED | OUTPATIENT
Start: 2025-06-03

## 2025-06-05 ENCOUNTER — NURSING HOME VISIT (OUTPATIENT)
Dept: POST ACUTE CARE | Facility: EXTERNAL LOCATION | Age: 70
End: 2025-06-05
Payer: MEDICARE

## 2025-06-05 DIAGNOSIS — E03.9 HYPOTHYROIDISM, UNSPECIFIED TYPE: Primary | ICD-10-CM

## 2025-06-05 DIAGNOSIS — J30.9 ALLERGIC RHINITIS, UNSPECIFIED SEASONALITY, UNSPECIFIED TRIGGER: ICD-10-CM

## 2025-06-05 DIAGNOSIS — E78.5 HYPERLIPIDEMIA, UNSPECIFIED HYPERLIPIDEMIA TYPE: ICD-10-CM

## 2025-06-05 DIAGNOSIS — Z79.4 TYPE 2 DIABETES MELLITUS WITH HYPOGLYCEMIA WITHOUT COMA, WITH LONG-TERM CURRENT USE OF INSULIN: ICD-10-CM

## 2025-06-05 DIAGNOSIS — E11.649 TYPE 2 DIABETES MELLITUS WITH HYPOGLYCEMIA WITHOUT COMA, WITH LONG-TERM CURRENT USE OF INSULIN: ICD-10-CM

## 2025-06-05 DIAGNOSIS — I10 HYPERTENSION, UNSPECIFIED TYPE: ICD-10-CM

## 2025-06-05 DIAGNOSIS — I48.91 ATRIAL FIBRILLATION, UNSPECIFIED TYPE (MULTI): ICD-10-CM

## 2025-06-05 DIAGNOSIS — K21.9 GASTROESOPHAGEAL REFLUX DISEASE WITHOUT ESOPHAGITIS: ICD-10-CM

## 2025-06-05 DIAGNOSIS — G47.00 INSOMNIA, UNSPECIFIED TYPE: ICD-10-CM

## 2025-06-05 DIAGNOSIS — N18.6 ESRD (END STAGE RENAL DISEASE) (MULTI): ICD-10-CM

## 2025-06-05 PROCEDURE — 99309 SBSQ NF CARE MODERATE MDM 30: CPT | Performed by: NURSE PRACTITIONER

## 2025-06-19 DIAGNOSIS — G89.29 CHRONIC BILATERAL LOW BACK PAIN WITHOUT SCIATICA: ICD-10-CM

## 2025-06-19 DIAGNOSIS — M54.50 CHRONIC BILATERAL LOW BACK PAIN WITHOUT SCIATICA: ICD-10-CM

## 2025-06-19 RX ORDER — TRAMADOL HYDROCHLORIDE 50 MG/1
50 TABLET, FILM COATED ORAL EVERY 8 HOURS PRN
Qty: 6 TABLET | Refills: 4 | Status: SHIPPED | OUTPATIENT
Start: 2025-06-19

## 2025-07-03 ENCOUNTER — NURSING HOME VISIT (OUTPATIENT)
Dept: POST ACUTE CARE | Facility: EXTERNAL LOCATION | Age: 70
End: 2025-07-03
Payer: MEDICARE

## 2025-07-03 DIAGNOSIS — R53.1 WEAKNESS: ICD-10-CM

## 2025-07-03 DIAGNOSIS — J96.90 RESPIRATORY FAILURE, UNSPECIFIED CHRONICITY, UNSPECIFIED WHETHER WITH HYPOXIA OR HYPERCAPNIA: ICD-10-CM

## 2025-07-03 DIAGNOSIS — I48.91 ATRIAL FIBRILLATION, UNSPECIFIED TYPE (MULTI): ICD-10-CM

## 2025-07-03 DIAGNOSIS — N18.6 ESRD (END STAGE RENAL DISEASE) (MULTI): ICD-10-CM

## 2025-07-03 DIAGNOSIS — I63.9 CEREBROVASCULAR ACCIDENT (CVA), UNSPECIFIED MECHANISM (MULTI): ICD-10-CM

## 2025-07-03 DIAGNOSIS — J90 PLEURAL EFFUSION ON RIGHT: ICD-10-CM

## 2025-07-03 DIAGNOSIS — E78.5 HYPERLIPIDEMIA, UNSPECIFIED HYPERLIPIDEMIA TYPE: ICD-10-CM

## 2025-07-03 DIAGNOSIS — I50.9 CONGESTIVE HEART FAILURE, UNSPECIFIED HF CHRONICITY, UNSPECIFIED HEART FAILURE TYPE: ICD-10-CM

## 2025-07-03 DIAGNOSIS — I10 HYPERTENSION, UNSPECIFIED TYPE: ICD-10-CM

## 2025-07-03 DIAGNOSIS — J44.9 CHRONIC OBSTRUCTIVE PULMONARY DISEASE, UNSPECIFIED COPD TYPE (MULTI): Primary | ICD-10-CM

## 2025-07-03 DIAGNOSIS — K21.9 GASTROESOPHAGEAL REFLUX DISEASE WITHOUT ESOPHAGITIS: ICD-10-CM

## 2025-07-03 DIAGNOSIS — Z91.81 AT RISK FOR FALLING: ICD-10-CM

## 2025-07-03 DIAGNOSIS — G93.40 ENCEPHALOPATHY, UNSPECIFIED TYPE: ICD-10-CM

## 2025-07-03 DIAGNOSIS — F32.A DEPRESSION, UNSPECIFIED DEPRESSION TYPE: ICD-10-CM

## 2025-07-03 PROCEDURE — 99309 SBSQ NF CARE MODERATE MDM 30: CPT | Performed by: INTERNAL MEDICINE

## 2025-07-03 NOTE — LETTER
Patient: Daphne Morris  : 1955    Encounter Date: 2025    PLACE OF SERVICE:  [facility]    This is a subsequent visit.    Subjective  Patient ID: Daphne Morris is a 70 y.o. female who presents for Follow-up.    Ms. Cecelia Morris is a 70-year-old female with history of end-stage renal disease, on hemodialysis.  She suffers from heart failure and COPD.  She is unable to care for herself and requires supportive care.    Review of Systems   Constitutional:  Negative for chills and fever.   Cardiovascular:  Negative for chest pain.   All other systems reviewed and are negative.    Objective  /84   Pulse 84   Temp 36.6 °C (97.8 °F)   Resp 18     Physical Exam  Vitals reviewed.   Constitutional:       General: She is not in acute distress.     Comments: This is a well-developed, well-nourished female, sitting in a chair.   HENT:      Right Ear: Tympanic membrane, ear canal and external ear normal.      Left Ear: Tympanic membrane, ear canal and external ear normal.   Eyes:      General: No scleral icterus.     Pupils: Pupils are equal, round, and reactive to light.   Neck:      Vascular: No carotid bruit.   Cardiovascular:      Heart sounds: Normal heart sounds, S1 normal and S2 normal. No murmur heard.     No friction rub.   Pulmonary:      Breath sounds: Decreased breath sounds (throughout) present.   Abdominal:      Palpations: There is no hepatomegaly, splenomegaly or mass.   Musculoskeletal:         General: No swelling or deformity. Normal range of motion.      Cervical back: Neck supple.      Right lower leg: Edema present.      Left lower leg: Edema present.   Lymphadenopathy:      Cervical: No cervical adenopathy.      Upper Body:      Right upper body: No axillary adenopathy.      Left upper body: No axillary adenopathy.      Lower Body: No right inguinal adenopathy. No left inguinal adenopathy.   Neurological:      Mental Status: She is  oriented to person, place, and time.      Cranial Nerves: Cranial nerves 2-12 are intact. No cranial nerve deficit.      Sensory: No sensory deficit.      Motor: Motor function is intact. No weakness.      Gait: Gait is intact.      Deep Tendon Reflexes: Reflexes normal.   Psychiatric:         Mood and Affect: Mood normal. Mood is not anxious or depressed. Affect is not angry.         Behavior: Behavior is not agitated.         Thought Content: Thought content normal.         Judgment: Judgment normal.     LAB WORK: Laboratory studies reviewed.    Assessment/Plan  Problem List Items Addressed This Visit           ICD-10-CM    Gastroesophageal reflux disease K21.9    Hypertension I10    Hyperlipidemia, unspecified E78.5    At risk for falling Z91.81    Cerebrovascular accident (CVA) (Multi) I63.9    ESRD (end stage renal disease) (Multi) N18.6     Other Visit Diagnoses         Codes      Chronic obstructive pulmonary disease, unspecified COPD type (Multi)    -  Primary J44.9      Respiratory failure, unspecified chronicity, unspecified whether with hypoxia or hypercapnia     J96.90      Congestive heart failure, unspecified HF chronicity, unspecified heart failure type     I50.9      Atrial fibrillation, unspecified type (Multi)     I48.91      Pleural effusion on right     J90      Depression, unspecified depression type     F32.A      Encephalopathy, unspecified type     G93.40      Weakness     R53.1        1. COPD, on bronchodilator therapy.  2. Respiratory failure, on oxygen.  3. CVA, on supportive care.  4. Heart failure, on diuretic.  5. End-stage renal disease, on hemodialysis.  6. Hypertension, med controlled.  7. Atrial fibrillation, on rate control.  8. Pleural effusion, follow with chest x-ray.  9. Depression, on medication.  10. Encephalopathy, on supportive care.  11. Hyperlipidemia, on statin.  12. GERD, on PPI.  13. Weakness, on PT/OT.  14. Fall risk, on fall precautions.    Scribe Attestation  By  signing my name below, I, Ann Santos attest that this documentation has been prepared under the direction and in the presence of Cale Lowe MD.     All medical record entries made by the anandibe were personally dictated by me I have reviewed the chart and agree the record accurately reflects my personal performance of his history physical examination and management      Electronically Signed By: Cale Lowe MD   7/8/25 12:23 AM

## 2025-07-06 VITALS
TEMPERATURE: 97.8 F | SYSTOLIC BLOOD PRESSURE: 136 MMHG | DIASTOLIC BLOOD PRESSURE: 84 MMHG | HEART RATE: 84 BPM | RESPIRATION RATE: 18 BRPM

## 2025-07-06 ASSESSMENT — ENCOUNTER SYMPTOMS
CHILLS: 0
FEVER: 0

## 2025-07-06 NOTE — PROGRESS NOTES
PLACE OF SERVICE:  [facility] [place]    This is a subsequent visit.    Subjective   Patient ID: Daphne Morris is a 70 y.o. female who presents for Follow-up.    Ms. Cecelia Morris is a 70-year-old female with history of end-stage renal disease, on hemodialysis.  She suffers from heart failure and COPD.  She is unable to care for herself and requires supportive care.    Review of Systems   Constitutional:  Negative for chills and fever.   Cardiovascular:  Negative for chest pain.   All other systems reviewed and are negative.    Objective   /84   Pulse 84   Temp 36.6 °C (97.8 °F)   Resp 18     Physical Exam  Vitals reviewed.   Constitutional:       General: She is not in acute distress.     Comments: This is a well-developed, well-nourished female, sitting in a chair.   HENT:      Right Ear: Tympanic membrane, ear canal and external ear normal.      Left Ear: Tympanic membrane, ear canal and external ear normal.   Eyes:      General: No scleral icterus.     Pupils: Pupils are equal, round, and reactive to light.   Neck:      Vascular: No carotid bruit.   Cardiovascular:      Heart sounds: Normal heart sounds, S1 normal and S2 normal. No murmur heard.     No friction rub.   Pulmonary:      Breath sounds: Decreased breath sounds (throughout) present.   Abdominal:      Palpations: There is no hepatomegaly, splenomegaly or mass.   Musculoskeletal:         General: No swelling or deformity. Normal range of motion.      Cervical back: Neck supple.      Right lower leg: Edema present.      Left lower leg: Edema present.   Lymphadenopathy:      Cervical: No cervical adenopathy.      Upper Body:      Right upper body: No axillary adenopathy.      Left upper body: No axillary adenopathy.      Lower Body: No right inguinal adenopathy. No left inguinal adenopathy.   Neurological:      Mental Status: She is oriented to person, place, and time.      Cranial Nerves: Cranial nerves 2-12  are intact. No cranial nerve deficit.      Sensory: No sensory deficit.      Motor: Motor function is intact. No weakness.      Gait: Gait is intact.      Deep Tendon Reflexes: Reflexes normal.   Psychiatric:         Mood and Affect: Mood normal. Mood is not anxious or depressed. Affect is not angry.         Behavior: Behavior is not agitated.         Thought Content: Thought content normal.         Judgment: Judgment normal.     LAB WORK: Laboratory studies reviewed.    Assessment/Plan   Problem List Items Addressed This Visit           ICD-10-CM    Gastroesophageal reflux disease K21.9    Hypertension I10    Hyperlipidemia, unspecified E78.5    At risk for falling Z91.81    Cerebrovascular accident (CVA) (Multi) I63.9    ESRD (end stage renal disease) (Multi) N18.6     Other Visit Diagnoses         Codes      Chronic obstructive pulmonary disease, unspecified COPD type (Multi)    -  Primary J44.9      Respiratory failure, unspecified chronicity, unspecified whether with hypoxia or hypercapnia     J96.90      Congestive heart failure, unspecified HF chronicity, unspecified heart failure type     I50.9      Atrial fibrillation, unspecified type (Multi)     I48.91      Pleural effusion on right     J90      Depression, unspecified depression type     F32.A      Encephalopathy, unspecified type     G93.40      Weakness     R53.1        1. COPD, on bronchodilator therapy.  2. Respiratory failure, on oxygen.  3. CVA, on supportive care.  4. Heart failure, on diuretic.  5. End-stage renal disease, on hemodialysis.  6. Hypertension, med controlled.  7. Atrial fibrillation, on rate control.  8. Pleural effusion, follow with chest x-ray.  9. Depression, on medication.  10. Encephalopathy, on supportive care.  11. Hyperlipidemia, on statin.  12. GERD, on PPI.  13. Weakness, on PT/OT.  14. Fall risk, on fall precautions.    Scribe Attestation  By signing my name below, IAnn, Scribisabella attest that this documentation has  been prepared under the direction and in the presence of Cale Lowe MD.     All medical record entries made by the scribe were personally dictated by me I have reviewed the chart and agree the record accurately reflects my personal performance of his history physical examination and management

## 2025-07-14 NOTE — PROGRESS NOTES
Chief Complaint:   Monthly visit for management of chronic disease    HPI:   Pt. seen today for monthly visit. Today, patient reports that she is feeling tired. She is c/o insomnia. She is eating about 75% of meals, on average, and is noted to have ~ 7.6 lb weight loss x 1 month. She denies dizziness, HA, SOB, cough, or chest pain. She denies any pain. Staff report no clinical concerns.     ROS:    As above in HPI. Otherwise, all other systems have been reviewed and are negative for complaint.    Medications reviewed and verified in NH chart.     Patient Active Problem List   Diagnosis    Hypothyroidism    Acute on chronic diastolic heart failure    Altered mental status    Anxiety    Chronic fatigue    Constipation by delayed colonic transit    Type 2 diabetes mellitus    Diabetic renal disease (Multi)    Gastroesophageal reflux disease    History of renal cell carcinoma    Hypertension    Peripheral vascular disease    Leukocytosis    Lung nodule    Major depressive disorder, single episode, unspecified    Osteoarthritis    Paroxysmal atrial fibrillation (Multi)    Primary insomnia    Stage 3b chronic kidney disease (CKD) (Multi)    Trouble walking    Hyperlipidemia, unspecified    ESRD on dialysis (Multi)    ANDREA (acute kidney injury)    ASHD (arteriosclerotic heart disease)    At risk for falling    Essential tremor    Vitamin D deficiency    Acute on chronic respiratory failure with hypoxia and hypercapnia    Pulmonary hypertension (Multi)    Pulmonary edema    Influenza B    History of right nephrectomy    Cognitive impairment    Unspecified open wound, right lower leg, initial encounter    Chronic kidney disease, stage V (Multi)    Cellulitis of right lower extremity    Open wound of right forearm    Open wound of right upper arm    Open wound of left lower leg    Abrasion of buttock, infected    Acute cystitis with hematuria    Complication associated with dialysis catheter    Cellulitis    Iron deficiency  anemia due to chronic blood loss    Cerebrovascular accident (CVA) (Multi)    CKD (chronic kidney disease) stage V requiring chronic dialysis (Multi)    ESRD (end stage renal disease) (Multi)    End stage renal disease (Multi)    Influenza    Recurrent pleural effusion    Chronic respiratory failure    Non-pressure chronic ulcer of left lower leg, limited to breakdown of skin    Hemiplegia and hemiparesis following cerebral infarction affecting right dominant side (Multi)    Moderate episode of recurrent major depressive disorder    Malignant neoplasm of right kidney (Multi)        Past Medical History:   Diagnosis Date    Anal fissure 10/12/2023    Anemia     ASHD (arteriosclerotic heart disease)     At risk for falls     Atrial fibrillation (Multi)     AV graft malfunction     CHF (congestive heart failure)     CKD (chronic kidney disease)     COPD (chronic obstructive pulmonary disease) (Multi)     CVA (cerebral vascular accident) (Multi)     2021- right-sided weakness    Depression     Diabetes mellitus (Multi)     Encephalopathy     End-stage renal disease on hemodialysis (Multi)     GERD (gastroesophageal reflux disease)     H/O pleural effusion     Hyperlipidemia     Hypertension     Hypothyroidism     Hypoxia     Impacted cerumen, left ear     Impacted cerumen of left ear    Influenza A     Noncompliance     Osteoarthritis     Perianal dermatitis 10/12/2023    Personal history of other diseases of the respiratory system     History of acute bronchitis    Pleural effusion     PVD (peripheral vascular disease)     Renal carcinoma, right (Multi)     s/p partial nephrectomy 8/2021    Respiratory failure (Multi)     Right sided weakness     TIA (transient ischemic attack)     Vasculitis 10/12/2023    Weakness        Past Surgical History:   Procedure Laterality Date    ANKLE SURGERY      2013    CARDIAC CATHETERIZATION N/A 2/7/2024    Procedure: Right Heart Cath;  Surgeon: Nicholas Antonio MD;  Location:  [facility];  Service: Cardiovascular;  Laterality: N/A;    CATARACT EXTRACTION Right     2019     SECTION, CLASSIC      MR CHEST ANGIO W AND WO IV CONTRAST  2023    MR CHEST ANGIO W AND WO IV CONTRAST 2023 [facility]    MR HEAD ANGIO WO IV CONTRAST  2021    MR HEAD ANGIO WO IV CONTRAST [facility]    NEPHRECTOMY  2021    SHOULDER SURGERY             Family History   Problem Relation Name Age of Onset    Hypertension Mother      Diabetes Father      Heart disease Father      Cancer Sister      Cancer Brother      Diabetes Daughter      Asthma Daughter      Heart attack Daughter      Diabetes Maternal Grandfather      Heart disease Paternal Grandmother      Diabetes Other      Hypertension Other      COPD Other      Other (chronic lung disease) Other         Social History     Tobacco Use   Smoking Status Every Day    Current packs/day: 0.50    Average packs/day: 0.5 packs/day for 56.5 years (28.3 ttl pk-yrs)    Types: Cigarettes    Start date: 1969    Passive exposure: Never   Smokeless Tobacco Never       Social History     Substance and Sexual Activity   Alcohol Use Not Currently       Social History     Substance and Sexual Activity   Drug Use Not Currently    Types: Marijuana       Allergies   Allergen Reactions    Bee Venom Protein (Honey Bee) Swelling    Citalopram Hives, Other, Rash and Nausea/vomiting     Facial breakout, blisters, and rash    Codeine Headache, Other and Itching     Migraines    Influenza Virus Vaccines Hives    Latex Other     blisters    Latex, Natural Rubber Other     blisters    Lisinopril Swelling and Nausea/vomiting     Facial swelling    Penicillins Swelling, Headache and Unknown    Adhesive Tape-Silicones Other     blisters    Amoxicillin Swelling and Rash    Doxycycline Rash and Unknown    Oseltamivir Rash and Nausea/vomiting    Phenyleph-Min Oil-Petrolatum Other    Phenyleph-Shark Oil-Glyc-Pet Rash    Phenylephrine  Nausea/vomiting    Prednisone Other and Nausea/vomiting     Yeast infection    Tuberculin Ppd Unknown    Xylometazoline Unknown        Vital Signs:   122/72-76-18-98.3-96% on RA     Physical Exam:  General: Sitting up in WC in NAD, alert   Head/Face: NCAT, symmetrical  Eyes: PERRLA, no injection, no discharge  ENT: Hearing not impaired, ears without scars or lesions, nasal mucosa and turbinates pink, septum midline, lips pink and moist  Neck: Supple, symmetrical  Respiratory: CTA but diminished without adventitious sounds, respirations even and nonlabored without use of accessory muscles, good air exchange  Cardio: Irregular rhythm, normal rate without murmur or gallops, normal S1S2, no edema, pedal pulses 2+/4 bilaterally  Chest/Breast: Symmetrical   GI: BS x 4, normoactive, non-distended, abd round and soft, no masses or tenderness  : No suprapubic tenderness or distention  MSK: Gait not assessed, joints with full ROM without pain or contractures, + generalized weakness  Skin: Skin warm and dry, no induration, dry skin to BLE, RUE fistula (+ bruit/+ thrill)   Neurologic: Cranial nerves II through XII intact, superficial touch and pain sensation intact  Psychiatric: Alert, oriented x 2-3, calm and cooperative     Results/Data:   2/21/15: RSV positive   2/7/25: Glu 130, BUN 39, Cr 3.10, K+ 5.6, GFR 16, Hgb 7.0, Hct 24.0  1/31/25: Hgb 7.4, Hct 24.6, Plt 408, BUN 19, Cr 1.8, Na+ 136, Kel 7.5, GFR 30  1/30/25: Alb 2.9, Phos 2.2, Hgb 7.5 (dialysis labs)  1/24/25: HgbA1C 7.8, Glu 107, BUN 18, Cr 2.0, Na+ 135, Kel 8.1, GFR 26, Chol 93, HDL 36, TSH 3.15, T4 10.8, Hgb 7.3, Hct 23.7, Plt 443  4/11/24: K+ 4.7, Phos 4.7    Assessment/Plan:  Chronic hypoxia and hypercapnic respiratory failure/recurrent right pleural effusion-2 gm Na+ diet, 1500 ml fluid restriction, c/w supplemental O2 (3 L at HS; 2-3 L during the day prn), Albuterol prn, monitor respiratory status closely   ESRD-c/w HD, c/w renal vitamin, monitor renal  function, nephro following  AFib/CAD/HTN/HLD/HFpEF-s/p Watchman procedure (Aug. 2023), s/p RHC (2/27/24) which showed mild pulmonary hypertension, c/w amiodarone, aspirin, atorvastatin, and plavix, continue to monitor BP and HR, F/U with cardiologist as scheduled  Anemia-c/w iron, monitor CBC  GERD-c/w PPI  Depression-c/w venlafaxine, supportive care, Psych following   DM2-LCS diet, accuchecks, c/w lispro ISS with meals, lantus stopped due to hypoglycemia, c/w BP and lipid control, yearly eye exam, diabetic foot care, monitor HgbA1C  Hypothyroidism/left thyroid nodule-c/w levothyroxine, monitor TSH and FT4  Arthritis-c/w Tylenol 1000 mg BID, Tramadol prn for mod-severe pain, encourage ROM   Hypophosphatemia-monitor phosphorus level, RD following  Hypoalbuminemia/weight loss-encourage PO intake, increase protein intake, c/w renal vitamin, monitor weights, RD following   Insomnia-increase melatonin to 6 mg Q HS, monitor  Allergic rhinitis-c/w zyrtec    Orders:  Increase melatonin to 6 mg PO Q HS for insomnia     Code Status:   DNR-CCA/DNI

## 2025-07-21 DIAGNOSIS — G89.29 CHRONIC BILATERAL LOW BACK PAIN WITHOUT SCIATICA: ICD-10-CM

## 2025-07-21 DIAGNOSIS — M54.50 CHRONIC BILATERAL LOW BACK PAIN WITHOUT SCIATICA: ICD-10-CM

## 2025-07-21 RX ORDER — TRAMADOL HYDROCHLORIDE 50 MG/1
50 TABLET, FILM COATED ORAL EVERY 8 HOURS PRN
Qty: 30 TABLET | Refills: 0 | Status: SHIPPED | OUTPATIENT
Start: 2025-07-21

## 2025-07-31 NOTE — PROGRESS NOTES
Chief Complaint:   Sinus congestion  Shortness of breath   Cough    HPI:   Pt. seen today for c/o sinus congestion, shortness of breath, and cough. She reports s/s x 2 days. She reports a productive cough. She denies dizziness, HA, or chest pain. She denies any known sick contacts, but does reside in a LTC facility. She has been afebrile. Staff report no other clinical concerns at this time.     ROS:    As above in HPI. Otherwise, all other systems have been reviewed and are negative for complaint.    Medications reviewed and verified in NH chart.     Patient Active Problem List   Diagnosis    Hypothyroidism    Acute on chronic diastolic heart failure    Altered mental status    Anxiety    Chronic fatigue    Constipation by delayed colonic transit    Type 2 diabetes mellitus    Diabetic renal disease (Multi)    Gastroesophageal reflux disease    History of renal cell carcinoma    Hypertension    Peripheral vascular disease    Leukocytosis    Lung nodule    Major depressive disorder, single episode, unspecified    Osteoarthritis    Paroxysmal atrial fibrillation (Multi)    Primary insomnia    Stage 3b chronic kidney disease (CKD) (Multi)    Trouble walking    Hyperlipidemia, unspecified    ESRD on dialysis (Multi)    ANDREA (acute kidney injury)    ASHD (arteriosclerotic heart disease)    At risk for falling    Essential tremor    Vitamin D deficiency    Acute on chronic respiratory failure with hypoxia and hypercapnia    Pulmonary hypertension (Multi)    Pulmonary edema    Influenza B    History of right nephrectomy    Cognitive impairment    Unspecified open wound, right lower leg, initial encounter    Chronic kidney disease, stage V (Multi)    Cellulitis of right lower extremity    Open wound of right forearm    Open wound of right upper arm    Open wound of left lower leg    Abrasion of buttock, infected    Acute cystitis with hematuria    Complication associated with dialysis catheter    Cellulitis    Iron  deficiency anemia due to chronic blood loss    Cerebrovascular accident (CVA) (Multi)    CKD (chronic kidney disease) stage V requiring chronic dialysis (Multi)    ESRD (end stage renal disease) (Multi)    End stage renal disease (Multi)    Influenza    Recurrent pleural effusion    Chronic respiratory failure    Non-pressure chronic ulcer of left lower leg, limited to breakdown of skin    Hemiplegia and hemiparesis following cerebral infarction affecting right dominant side (Multi)    Moderate episode of recurrent major depressive disorder    Malignant neoplasm of right kidney (Multi)        Past Medical History:   Diagnosis Date    Anal fissure 10/12/2023    Anemia     ASHD (arteriosclerotic heart disease)     At risk for falls     Atrial fibrillation (Multi)     AV graft malfunction     CHF (congestive heart failure)     CKD (chronic kidney disease)     COPD (chronic obstructive pulmonary disease) (Multi)     CVA (cerebral vascular accident) (Multi)     2021- right-sided weakness    Depression     Diabetes mellitus (Multi)     Encephalopathy     End-stage renal disease on hemodialysis (Multi)     GERD (gastroesophageal reflux disease)     H/O pleural effusion     Hyperlipidemia     Hypertension     Hypothyroidism     Hypoxia     Impacted cerumen, left ear     Impacted cerumen of left ear    Influenza A     Noncompliance     Osteoarthritis     Perianal dermatitis 10/12/2023    Personal history of other diseases of the respiratory system     History of acute bronchitis    Pleural effusion     PVD (peripheral vascular disease)     Renal carcinoma, right (Multi)     s/p partial nephrectomy 8/2021    Respiratory failure (Multi)     Right sided weakness     TIA (transient ischemic attack)     Vasculitis 10/12/2023    Weakness        Past Surgical History:   Procedure Laterality Date    ANKLE SURGERY      2013    CARDIAC CATHETERIZATION N/A 2/7/2024    Procedure: Right Heart Cath;  Surgeon: Nicholas Antonio MD;   Location: [facility];  Service: Cardiovascular;  Laterality: N/A;    CATARACT EXTRACTION Right     2019     SECTION, CLASSIC      MR CHEST ANGIO W AND WO IV CONTRAST  2023    MR CHEST ANGIO W AND WO IV CONTRAST 2023 [facility]    MR HEAD ANGIO WO IV CONTRAST  2021    MR HEAD ANGIO WO IV CONTRAST [facility]    NEPHRECTOMY  2021    SHOULDER SURGERY      2009       Family History   Problem Relation Name Age of Onset    Hypertension Mother      Diabetes Father      Heart disease Father      Cancer Sister      Cancer Brother      Diabetes Daughter      Asthma Daughter      Heart attack Daughter      Diabetes Maternal Grandfather      Heart disease Paternal Grandmother      Diabetes Other      Hypertension Other      COPD Other      Other (chronic lung disease) Other         Social History     Tobacco Use   Smoking Status Every Day    Current packs/day: 0.50    Average packs/day: 0.5 packs/day for 56.6 years (28.3 ttl pk-yrs)    Types: Cigarettes    Start date: 1969    Passive exposure: Never   Smokeless Tobacco Never       Social History     Substance and Sexual Activity   Alcohol Use Not Currently       Social History     Substance and Sexual Activity   Drug Use Not Currently    Types: Marijuana       Allergies   Allergen Reactions    Bee Venom Protein (Honey Bee) Swelling    Citalopram Hives, Other, Rash and Nausea/vomiting     Facial breakout, blisters, and rash    Codeine Headache, Other and Itching     Migraines    Influenza Virus Vaccines Hives    Latex Other     blisters    Latex, Natural Rubber Other     blisters    Lisinopril Swelling and Nausea/vomiting     Facial swelling    Penicillins Swelling, Headache and Unknown    Adhesive Tape-Silicones Other     blisters    Amoxicillin Swelling and Rash    Doxycycline Rash and Unknown    Oseltamivir Rash and Nausea/vomiting    Phenyleph-Min Oil-Petrolatum Other    Phenyleph-Shark Oil-Glyc-Pet Rash     Phenylephrine Nausea/vomiting    Prednisone Other and Nausea/vomiting     Yeast infection    Tuberculin Ppd Unknown    Xylometazoline Unknown        Vital Signs:   129/72-78-18-98.0-97% on RA     Physical Exam:  General: Sitting up in WC in NAD, alert   Head/Face: NCAT, symmetrical  Eyes: PERRLA, no injection, no discharge  ENT: Hearing not impaired, ears without scars or lesions, nasal mucosa and turbinates pink, septum midline, lips pink and moist  Neck: Supple, symmetrical  Respiratory: CTA but diminished with diffuse wheezing noted, respirations even and nonlabored without use of accessory muscles  Cardio: Irregular rhythm, normal rate without murmur or gallops, normal S1S2, no edema, pedal pulses 2+/4 bilaterally  Chest/Breast: Symmetrical   GI: BS x 4, normoactive, non-distended, abd round and soft, no masses or tenderness  : No suprapubic tenderness or distention  MSK: Gait not assessed, joints with full ROM without pain or contractures, + generalized weakness  Skin: Skin warm and dry, no induration, dry skin to BLE, RUE fistula (+ bruit/+ thrill)   Neurologic: Cranial nerves II through XII intact, superficial touch and pain sensation intact  Psychiatric: Alert, oriented x 2-3, calm and cooperative     Results/Data:   4/7/25: RSV positive   2/21/15: RSV positive   2/7/25: Glu 130, BUN 39, Cr 3.10, K+ 5.6, GFR 16, Hgb 7.0, Hct 24.0  1/31/25: Hgb 7.4, Hct 24.6, Plt 408, BUN 19, Cr 1.8, Na+ 136, Kel 7.5, GFR 30  1/30/25: Alb 2.9, Phos 2.2, Hgb 7.5 (dialysis labs)  1/24/25: HgbA1C 7.8, Glu 107, BUN 18, Cr 2.0, Na+ 135, Kel 8.1, GFR 26, Chol 93, HDL 36, TSH 3.15, T4 10.8, Hgb 7.3, Hct 23.7, Plt 443  4/11/24: K+ 4.7, Phos 4.7    Assessment/Plan:  Sinus congestion/shortness of breath/cough-Duonebs TID x 5 days, mucinex/robitussin prn, patient encouraged to be OOB, monitor VS closely, monitor resp. status closely    Orders:  Mana TID x 5 days     Code Status:   DNR-CCA/DNI

## (undated) DEVICE — 40436 HEAD REST OCULAR: Brand: 40436 HEAD REST OCULAR

## (undated) DEVICE — SHEATH, PINNACLE, W/.038 GUIDEWIRE, 10 CM,  7FR INTRODUCER, 7FR DIA, 2.5 CM DIALATOR

## (undated) DEVICE — TUBING, SUCTION, NON-CONDUCTIVE, FEMALE, 6.4MM X 10, STERILE

## (undated) DEVICE — CAUTERY BPLR 25GA INTOCU W/ HNDPC CRD DISP FOR CX9404

## (undated) DEVICE — CAUTERY, PENCIL, PUSH BUTTON, SMOKE EVAC, 70MM

## (undated) DEVICE — GLOVE ORANGE PI 7 1/2   MSG9075

## (undated) DEVICE — SHIELD EYE W3XL2.5IN UNIV CLR PLAS LTWT

## (undated) DEVICE — SYRINGE, HYPODERMIC, LUER LOCK, 6 CC

## (undated) DEVICE — SYRINGE, 20 CC, LUER LOCK

## (undated) DEVICE — GLOVE SURG SZ 8 CRM LTX FREE POLYISOPRENE POLYMER BEAD ANTI

## (undated) DEVICE — TAPE ADH W2INXL10YD PLAS TRNSPAR H2O RESIST HYPOALRG CURAD

## (undated) DEVICE — NEEDLE HYPO 25GA L1.5IN BLU POLYPR HUB S STL REG BVL STR

## (undated) DEVICE — DRAPE MICSCP FULL FLD STRL OCULUS ZEISS

## (undated) DEVICE — SYRINGE, LUER LOCK, 10ML: Brand: MEDLINE

## (undated) DEVICE — TAPE ADH W1INXL10YD WHT PAPR GENTLE BRTH FLX COMFORTABLE

## (undated) DEVICE — 25 MM 1.2 MICRON SYRINGE FILTER: Brand: SUPOR

## (undated) DEVICE — 3M™ TEGADERM™ TRANSPARENT FILM DRESSING FRAME STYLE, 1626W, 4 IN X 4-3/4 IN (10 CM X 12 CM), 50/CT 4CT/CASE: Brand: 3M™ TEGADERM™

## (undated) DEVICE — SUTURE, VICRYL, 3-0, 27 IN, SH

## (undated) DEVICE — PAD,EYE,1-5/8X2 5/8,STERILE,LF,1/PK: Brand: MEDLINE

## (undated) DEVICE — BIOM OPTIC SET DISPOSABLE, WITH BIOM HD FLEX LENS FOR F=175 MM OR F=200 MM: Brand: BIOM OPTIC SET DISPOSABLE, WITH BIOM HD FLEX LENS FOR F=175 MM OR F=200 MM

## (undated) DEVICE — SPONGE GAUZE, XRAY RFD, 8X4 12 PLY

## (undated) DEVICE — GEL, ULTRASOUND, AQUASONIC 100, 20 GM, STERILE

## (undated) DEVICE — SUTURE, PROLENE, 5-0, 18 IN, C-1, BLUE

## (undated) DEVICE — Device

## (undated) DEVICE — PACK PROCEDURE SURG RETINAL ST ES CUST

## (undated) DEVICE — SUTURE, MONOCRYL, 4-0, 18 IN, PS2, UNDYED

## (undated) DEVICE — LOOP, VESSEL, MINI, WHITE

## (undated) DEVICE — SYRINGE, 10 CC, SLIP TIP

## (undated) DEVICE — DRESSING, TEGADERM, CHG, 3.5 X 4.5 IN

## (undated) DEVICE — NEEDLE, HYPODERMIC, MONOJECT, 22 G X 1.5 IN, BLUE, RIGID PACK

## (undated) DEVICE — STOP COCK W/ ROTATING LOCK

## (undated) DEVICE — SUTURE, SILK, 4-0, 18 IN, LABYRINTH, BLACK

## (undated) DEVICE — NEEDLE FLTR 18GA L1.5IN MEM THK5UM BLNT DISP

## (undated) DEVICE — APPLIER, MULTIPLE CLIP, LIGACLIP, W/20 MEDIUM, 11.5 APPLIER

## (undated) DEVICE — NEEDLE, BLUNT FILL, 18GA X 1 IN

## (undated) DEVICE — SUTURE, PROLENE, 3-0, 18 IN, PS2, BLUE

## (undated) DEVICE — SUTURE, PROLENE, 6-0, 18 IN, BV1, DA, BLUE

## (undated) DEVICE — LOOP, VESSEL, MAXI, RED

## (undated) DEVICE — DRESSING, GAUZE, SPONGE, 8 PLY, CURITY, 2 X 2 IN, STERILE

## (undated) DEVICE — KIT,ANTI FOG,W/SPONGE & FLUID,SOFT PACK: Brand: MEDLINE

## (undated) DEVICE — PACK SURGICAL PROCEDURE 25 GAUGE/20PK

## (undated) DEVICE — BAG, DECANTER

## (undated) DEVICE — DRAPE, SHEET, LARGE, 70 X 85IN, STERILE

## (undated) DEVICE — MICROINTRODUCER KIT, VSI, 5FR X 40CM, REGULAR

## (undated) DEVICE — GLOVE SURG SZ 7 CRM LTX FREE POLYISOPRENE POLYMER BEAD ANTI

## (undated) DEVICE — ANGIO KIT, LEFT HEART, LF, CUSTOM

## (undated) DEVICE — KIT, TOURNIQUET, 7"

## (undated) DEVICE — SUTURE, VICRYL, 4-0, 27 IN, PS-1, UNDYED

## (undated) DEVICE — GLOVE ORANGE PI 7   MSG9070

## (undated) DEVICE — Device: Brand: 25-27G BRITELIGHT™ ANGLED 20° ENDOPROBE® BOX OF 6

## (undated) DEVICE — LIGASURE EXACT DISSECTOR

## (undated) DEVICE — ELECTRODE, INSULATED BLADE, EDGE, W/ 2.75 SLEEVE

## (undated) DEVICE — COVER, PROBE, FLEXI-FEEL, W/STERILE GEL 4 X 48 IN

## (undated) DEVICE — DRAPE PACK, MINOR, CUSTOM, GEAUGA

## (undated) DEVICE — GLOVE, SURGICAL, BIOGEL, PI MICRO UNDER GLOVE, SZ 7.5, PF

## (undated) DEVICE — SPONGE, LAP, XRAY DECT, 18IN X 18IN, W/MASTER DMT, STERILE

## (undated) DEVICE — ADHESIVE, SKIN, LIQUIBAND EXCEED

## (undated) DEVICE — VISCOUS FLUID CONTROL PAK: Brand: CONSTELLATION

## (undated) DEVICE — ADHESIVE, SKIN, DERMABOND ADVANCED, 15CM, PEN-STYLE

## (undated) DEVICE — SOLUTION IRRIG 1000ML STRL H2O USP PLAS POUR BTL

## (undated) DEVICE — MANIFOLD KIT, CUSTOM, GEAUGA

## (undated) DEVICE — SOLUTION, INJECTION, USP, SODIUM CHLORIDE 0.9%, .9 NACL, 500 ML, BAG

## (undated) DEVICE — APPLICATOR, CHLORAPREP, W/ORANGE TINT, 26ML

## (undated) DEVICE — DRAPE, INCISE, ANTIMICROBIAL, IOBAN 2, LARGE, 17 X 23 IN, DISPOSABLE, STERILE

## (undated) DEVICE — STRIP, SKIN CLOSURE, STERI STRIP, REINFORCED, 0.5 X 4 IN

## (undated) DEVICE — BRACELET ID ALERT FOR PT INFO ISPAN

## (undated) DEVICE — APPLIER, LIGACLIP, MULTIPLE, SMALL 9-3/8IN

## (undated) DEVICE — ADHESIVE, SKIN, MASTISOL, 2/3 CC VIAL

## (undated) DEVICE — DRAPE, SHEET, THYROID, W/ARMBOARD COVER, 100 X 121 IN, DISPOSABLE, LF, STERILE

## (undated) DEVICE — SUTURE, SILK, 2-0, 18 IN, BLACK

## (undated) DEVICE — SUTURE, SILK, 2-0, 30 IN, SH, BLACK